# Patient Record
Sex: FEMALE | Race: BLACK OR AFRICAN AMERICAN | NOT HISPANIC OR LATINO | Employment: OTHER | URBAN - METROPOLITAN AREA
[De-identification: names, ages, dates, MRNs, and addresses within clinical notes are randomized per-mention and may not be internally consistent; named-entity substitution may affect disease eponyms.]

---

## 2018-11-28 ENCOUNTER — OFFICE VISIT (OUTPATIENT)
Dept: PULMONOLOGY | Facility: CLINIC | Age: 68
End: 2018-11-28
Payer: COMMERCIAL

## 2018-11-28 ENCOUNTER — LAB VISIT (OUTPATIENT)
Dept: LAB | Facility: HOSPITAL | Age: 68
End: 2018-11-28
Attending: INTERNAL MEDICINE
Payer: COMMERCIAL

## 2018-11-28 VITALS
OXYGEN SATURATION: 98 % | RESPIRATION RATE: 18 BRPM | HEIGHT: 71 IN | DIASTOLIC BLOOD PRESSURE: 62 MMHG | HEART RATE: 97 BPM | SYSTOLIC BLOOD PRESSURE: 130 MMHG | BODY MASS INDEX: 41.02 KG/M2 | WEIGHT: 293 LBS

## 2018-11-28 DIAGNOSIS — G47.33 OSA (OBSTRUCTIVE SLEEP APNEA): ICD-10-CM

## 2018-11-28 DIAGNOSIS — R06.89 DYSPNEA AND RESPIRATORY ABNORMALITIES: Primary | ICD-10-CM

## 2018-11-28 DIAGNOSIS — E66.01 CLASS 3 SEVERE OBESITY DUE TO EXCESS CALORIES WITH SERIOUS COMORBIDITY AND BODY MASS INDEX (BMI) OF 45.0 TO 49.9 IN ADULT: Chronic | ICD-10-CM

## 2018-11-28 DIAGNOSIS — R06.89 DYSPNEA AND RESPIRATORY ABNORMALITIES: ICD-10-CM

## 2018-11-28 DIAGNOSIS — R06.00 DYSPNEA AND RESPIRATORY ABNORMALITIES: Primary | ICD-10-CM

## 2018-11-28 DIAGNOSIS — R06.00 DYSPNEA AND RESPIRATORY ABNORMALITIES: ICD-10-CM

## 2018-11-28 PROBLEM — E66.813 CLASS 3 SEVERE OBESITY DUE TO EXCESS CALORIES WITH SERIOUS COMORBIDITY AND BODY MASS INDEX (BMI) OF 45.0 TO 49.9 IN ADULT: Status: ACTIVE | Noted: 2018-11-28

## 2018-11-28 LAB
ALBUMIN SERPL BCP-MCNC: 3.2 G/DL
ALP SERPL-CCNC: 91 U/L
ALT SERPL W/O P-5'-P-CCNC: 9 U/L
ANION GAP SERPL CALC-SCNC: 11 MMOL/L
AST SERPL-CCNC: 13 U/L
BASOPHILS # BLD AUTO: 0.08 K/UL
BASOPHILS NFR BLD: 1 %
BILIRUB SERPL-MCNC: 0.3 MG/DL
BNP SERPL-MCNC: 22 PG/ML
BUN SERPL-MCNC: 26 MG/DL
CALCIUM SERPL-MCNC: 9.8 MG/DL
CHLORIDE SERPL-SCNC: 110 MMOL/L
CO2 SERPL-SCNC: 21 MMOL/L
CREAT SERPL-MCNC: 1.4 MG/DL
CRP SERPL-MCNC: 47.3 MG/L
DIFFERENTIAL METHOD: ABNORMAL
EOSINOPHIL # BLD AUTO: 0.1 K/UL
EOSINOPHIL NFR BLD: 1.2 %
ERYTHROCYTE [DISTWIDTH] IN BLOOD BY AUTOMATED COUNT: 17.7 %
ERYTHROCYTE [SEDIMENTATION RATE] IN BLOOD BY WESTERGREN METHOD: 31 MM/HR
EST. GFR  (AFRICAN AMERICAN): 45.2 ML/MIN/1.73 M^2
EST. GFR  (NON AFRICAN AMERICAN): 39.2 ML/MIN/1.73 M^2
GLUCOSE SERPL-MCNC: 99 MG/DL
HCT VFR BLD AUTO: 37.6 %
HGB BLD-MCNC: 10.9 G/DL
IMM GRANULOCYTES # BLD AUTO: 0.02 K/UL
IMM GRANULOCYTES NFR BLD AUTO: 0.2 %
LYMPHOCYTES # BLD AUTO: 2.5 K/UL
LYMPHOCYTES NFR BLD: 30.6 %
MCH RBC QN AUTO: 22.1 PG
MCHC RBC AUTO-ENTMCNC: 29 G/DL
MCV RBC AUTO: 76 FL
MONOCYTES # BLD AUTO: 0.6 K/UL
MONOCYTES NFR BLD: 7.7 %
NEUTROPHILS # BLD AUTO: 4.9 K/UL
NEUTROPHILS NFR BLD: 59.3 %
NRBC BLD-RTO: 0 /100 WBC
PLATELET # BLD AUTO: 320 K/UL
PMV BLD AUTO: 9.9 FL
POTASSIUM SERPL-SCNC: 4 MMOL/L
PROT SERPL-MCNC: 7.4 G/DL
RBC # BLD AUTO: 4.93 M/UL
SODIUM SERPL-SCNC: 142 MMOL/L
TSH SERPL DL<=0.005 MIU/L-ACNC: 2.1 UIU/ML
WBC # BLD AUTO: 8.23 K/UL

## 2018-11-28 PROCEDURE — 86038 ANTINUCLEAR ANTIBODIES: CPT

## 2018-11-28 PROCEDURE — 36415 COLL VENOUS BLD VENIPUNCTURE: CPT

## 2018-11-28 PROCEDURE — 83880 ASSAY OF NATRIURETIC PEPTIDE: CPT

## 2018-11-28 PROCEDURE — 80053 COMPREHEN METABOLIC PANEL: CPT

## 2018-11-28 PROCEDURE — 84443 ASSAY THYROID STIM HORMONE: CPT

## 2018-11-28 PROCEDURE — 86140 C-REACTIVE PROTEIN: CPT

## 2018-11-28 PROCEDURE — 99999 PR PBB SHADOW E&M-EST. PATIENT-LVL III: CPT | Mod: PBBFAC,,, | Performed by: INTERNAL MEDICINE

## 2018-11-28 PROCEDURE — 99205 OFFICE O/P NEW HI 60 MIN: CPT | Mod: S$GLB,,, | Performed by: INTERNAL MEDICINE

## 2018-11-28 PROCEDURE — 85025 COMPLETE CBC W/AUTO DIFF WBC: CPT

## 2018-11-28 PROCEDURE — 1101F PT FALLS ASSESS-DOCD LE1/YR: CPT | Mod: CPTII,S$GLB,, | Performed by: INTERNAL MEDICINE

## 2018-11-28 PROCEDURE — 85651 RBC SED RATE NONAUTOMATED: CPT

## 2018-11-28 PROCEDURE — 86255 FLUORESCENT ANTIBODY SCREEN: CPT

## 2018-11-28 RX ORDER — QUETIAPINE FUMARATE 100 MG/1
TABLET, FILM COATED ORAL
COMMUNITY
Start: 2018-11-25 | End: 2019-01-22

## 2018-11-28 RX ORDER — DOXAZOSIN 4 MG/1
TABLET ORAL
Refills: 1 | COMMUNITY
Start: 2018-09-26 | End: 2018-11-28

## 2018-11-28 RX ORDER — MELOXICAM 15 MG/1
TABLET ORAL
Refills: 2 | COMMUNITY
Start: 2018-10-17 | End: 2018-11-28

## 2018-11-28 RX ORDER — HYDROCHLOROTHIAZIDE 12.5 MG/1
CAPSULE ORAL
Refills: 11 | COMMUNITY
Start: 2018-10-28 | End: 2018-11-28

## 2018-11-28 RX ORDER — AMLODIPINE BESYLATE 10 MG/1
TABLET ORAL
Refills: 1 | COMMUNITY
Start: 2018-09-26 | End: 2019-05-14

## 2018-11-28 NOTE — ASSESSMENT & PLAN NOTE
Etiology unclear.  Multifactorial etiology suspected.  Likely contributors to etiology (checked)    [x] Pulmonary airway disease    [x]  Pulmonary parenchymal disease  [] Pulmonary vascular disease   [] Pleural disease  [] Pulmonary vasculitis  [x] Hypoventilation ( chest wall deformity, neuromuscular disease, obesity etc)  [x] Anemia  [x] Thyroid disease.  [x] Cardiac illess  [x]         Sleep disorder    EVALUATION:  []        Complete PFT with bronchodilator.  []        Bronchial challenge with methacholine.   [x]        Stress test, pulmonary.  [x]        PULM - Arterial Blood Gases  [x]        Chest X Ray  []        CT scan of chest.   [x]        ISAC  [x]        Sedimentation rate  [x]        C-reactive protein  [x]        Anti-neutrophilic cytoplasmic antibody  [x]        2D ECHO with color flow doppler and bubble contrast.       PLAN:  Discussed diagnosis, its evaluation, treatment and usual course.  All questions answered.        Call if shortness of breath worsens, blood in sputum, change in character of cough, development of fever or chills, inability to maintain nutrition and hydration.     Re evaluate in four weeks with results.

## 2018-11-28 NOTE — PATIENT INSTRUCTIONS
Lung Anatomy  Your lungs take air in to give your body oxygen, which the body needs to work. Your lungs, like all the tissues in your body, are made up of billions of tiny specialized cells. Old lung cells die and are replaced by new, identical lung cells. This natural process helps ensure healthy lungs.    Date Last Reviewed: 11/1/2016  © 4148-6523 [employer]. [address] [place] [address], [place] [address]. All rights reserved. This information is not intended as a substitute for professional medical care. Always follow your healthcare professional's instructions.

## 2018-11-28 NOTE — ASSESSMENT & PLAN NOTE
My recommendation at this point would be to set up a home sleep [facility].  We have discussed weight loss and how this may improve her situation.  We have discussed behavioral modifications, as well.  After her study, she  could certainly try a CPAP.

## 2018-11-29 ENCOUNTER — TELEPHONE (OUTPATIENT)
Dept: PULMONOLOGY | Facility: CLINIC | Age: 68
End: 2018-11-29

## 2018-11-29 LAB — ANA SER QL IF: NORMAL

## 2018-11-30 LAB
ANCA AB TITR SER IF: NORMAL TITER
P-ANCA TITR SER IF: NORMAL TITER

## 2018-12-26 LAB — HCV AB SER-ACNC: NEGATIVE

## 2018-12-28 ENCOUNTER — HOSPITAL ENCOUNTER (OUTPATIENT)
Dept: RADIOLOGY | Facility: HOSPITAL | Age: 68
Discharge: HOME | End: 2018-12-28
Attending: INTERNAL MEDICINE
Payer: COMMERCIAL

## 2018-12-28 ENCOUNTER — CLINICAL SUPPORT (OUTPATIENT)
Dept: PULMONOLOGY | Facility: CLINIC | Age: 68
End: 2018-12-28
Payer: COMMERCIAL

## 2018-12-28 DIAGNOSIS — R06.00 DYSPNEA AND RESPIRATORY ABNORMALITIES: ICD-10-CM

## 2018-12-28 DIAGNOSIS — R06.89 DYSPNEA AND RESPIRATORY ABNORMALITIES: ICD-10-CM

## 2018-12-28 LAB
ALLENS TEST: ABNORMAL
DELSYS: ABNORMAL
HCO3 UR-SCNC: 20.7 MMOL/L (ref 24–28)
PCO2 BLDA: 38.6 MMHG (ref 35–45)
PH SMN: 7.34 [PH] (ref 7.35–7.45)
PO2 BLDA: 41 MMHG (ref 40–60)
POC BE: -5 MMOL/L
POC SATURATED O2: 73 % (ref 95–100)
SAMPLE: ABNORMAL
SITE: ABNORMAL

## 2018-12-28 PROCEDURE — 94729 DIFFUSING CAPACITY: CPT | Mod: S$GLB,,, | Performed by: INTERNAL MEDICINE

## 2018-12-28 PROCEDURE — 71046 X-RAY EXAM CHEST 2 VIEWS: CPT | Mod: TC

## 2018-12-28 PROCEDURE — 71046 X-RAY EXAM CHEST 2 VIEWS: CPT | Mod: 26,,, | Performed by: RADIOLOGY

## 2018-12-28 PROCEDURE — 94060 EVALUATION OF WHEEZING: CPT | Mod: S$GLB,,, | Performed by: INTERNAL MEDICINE

## 2018-12-28 PROCEDURE — 94726 PLETHYSMOGRAPHY LUNG VOLUMES: CPT | Mod: S$GLB,,, | Performed by: INTERNAL MEDICINE

## 2018-12-28 NOTE — PROGRESS NOTES
Patient unable to walk due to knee pain from a procedure done on both knees. Will reschedule at a later time.

## 2018-12-31 LAB
BRPFT: ABNORMAL
ERV LLN: 0.77
ERV PRE REF: 16.9 %
ERV REF: 0.77
ERVN2 LLN: 0.77
ERVN2 REF: 0.77
FEF 25 75 CHG: 13.4 %
FEF 25 75 LLN: 0.78
FEF 25 75 POST REF: 136.7 %
FEF 25 75 PRE REF: 120.6 %
FEF 25 75 REF: 1.98
FET100 CHG: 42.3 %
FEV1 CHG: 6.9 %
FEV1 FVC CHG: -3.7 %
FEV1 FVC LLN: 66
FEV1 FVC POST REF: 116.8 %
FEV1 FVC PRE REF: 121.3 %
FEV1 FVC REF: 78
FEV1 LLN: 1.68
FEV1 POST REF: 97.9 %
FEV1 PRE REF: 91.6 %
FEV1 REF: 2.37
FEV6 LLN: 2.23
FEV6 REF: 3.06
FRCN2 LLN: 2.21
FRCN2 REF: 3.03
FRCPLETH LLN: 2.21
FRCPLETH PREREF: 89.3 %
FRCPLETH REF: 3.03
FVC CHG: 11.1 %
FVC LLN: 2.19
FVC POST REF: 83.1 %
FVC PRE REF: 74.9 %
FVC REF: 3.06
MVV LLN: 93
MVV PRE REF: 42.9 %
MVV REF: 110
PEF CHG: -22.6 %
PEF LLN: 3.76
PEF POST REF: 66.5 %
PEF PRE REF: 85.9 %
PEF REF: 6.14
POST FEF 25 75: 2.7 L/S (ref 0.78–3.17)
POST FET 100: 1.78 SEC
POST FEV1 FVC: 91.35 % (ref 65.82–90.61)
POST FEV1: 2.32 L (ref 1.68–3.06)
POST FVC: 2.54 L (ref 2.19–3.92)
POST PEF: 4.08 L/S (ref 3.76–8.52)
PRE ERV: 0.13 L (ref 0.77–0.77)
PRE FEF 25 75: 2.38 L/S (ref 0.78–3.17)
PRE FET 100: 1.25 SEC
PRE FEV1 FVC: 94.89 % (ref 65.82–90.61)
PRE FEV1: 2.17 L (ref 1.68–3.06)
PRE FRC PL: 2.71 L
PRE FVC: 2.29 L (ref 2.19–3.92)
PRE MVV: 47 L/MIN (ref 93.21–126.11)
PRE PEF: 5.27 L/S (ref 3.76–8.52)
PRE RV: 2.59 L (ref 1.68–2.84)
PRE TLC: 4.88 L (ref 4.91–6.88)
RAW LLN: 3.06
RAW PRE REF: 113.1 %
RAW PRE: 3.46 CMH2O*S/L (ref 3.06–3.06)
RAW REF: 3.06
RV LLN: 1.68
RV PRE REF: 114.4 %
RV REF: 2.26
RVN2 LLN: 1.68
RVN2 REF: 2.26
RVN2TLCN2 LLN: 32
RVN2TLCN2 REF: 41
RVTLC LLN: 32
RVTLC PRE REF: 128.1 %
RVTLC PRE: 53.05 % (ref 31.81–50.99)
RVTLC REF: 41
TLC LLN: 4.91
TLC PRE REF: 82.7 %
TLC REF: 5.89
TLCN2 LLN: 4.91
TLCN2 REF: 5.89
VC LLN: 2.19
VC PRE REF: 74.9 %
VC PRE: 2.29 L (ref 2.19–3.92)
VC REF: 3.06
VCMAXN2 LLN: 2.19
VCMAXN2 REF: 3.06
VTGRAWPRE: 2.63 L

## 2019-01-22 ENCOUNTER — TELEPHONE (OUTPATIENT)
Dept: OBSTETRICS AND GYNECOLOGY | Facility: CLINIC | Age: 69
End: 2019-01-22

## 2019-01-22 ENCOUNTER — OFFICE VISIT (OUTPATIENT)
Dept: OBSTETRICS AND GYNECOLOGY | Facility: CLINIC | Age: 69
End: 2019-01-22
Payer: COMMERCIAL

## 2019-01-22 VITALS
HEIGHT: 71 IN | DIASTOLIC BLOOD PRESSURE: 76 MMHG | BODY MASS INDEX: 41.02 KG/M2 | WEIGHT: 293 LBS | SYSTOLIC BLOOD PRESSURE: 120 MMHG

## 2019-01-22 DIAGNOSIS — Z00.00 PREVENTATIVE HEALTH CARE: ICD-10-CM

## 2019-01-22 DIAGNOSIS — Z01.419 GYNECOLOGIC EXAM NORMAL: ICD-10-CM

## 2019-01-22 DIAGNOSIS — Z12.39 BREAST CANCER SCREENING: Primary | ICD-10-CM

## 2019-01-22 DIAGNOSIS — N89.8 VAGINAL CYST: ICD-10-CM

## 2019-01-22 PROCEDURE — 88304 TISSUE SPECIMEN TO PATHOLOGY, OBSTETRICS/GYNECOLOGY: ICD-10-PCS | Mod: 26,,, | Performed by: PATHOLOGY

## 2019-01-22 PROCEDURE — 99999 PR PBB SHADOW E&M-EST. PATIENT-LVL II: CPT | Mod: PBBFAC,,, | Performed by: NURSE PRACTITIONER

## 2019-01-22 PROCEDURE — 99999 PR PBB SHADOW E&M-EST. PATIENT-LVL II: ICD-10-PCS | Mod: PBBFAC,,, | Performed by: NURSE PRACTITIONER

## 2019-01-22 PROCEDURE — 99387 PR PREVENTIVE VISIT,NEW,65 & OVER: ICD-10-PCS | Mod: S$GLB,,, | Performed by: NURSE PRACTITIONER

## 2019-01-22 PROCEDURE — 88304 TISSUE EXAM BY PATHOLOGIST: CPT | Mod: 26,,, | Performed by: PATHOLOGY

## 2019-01-22 PROCEDURE — 88304 TISSUE EXAM BY PATHOLOGIST: CPT | Performed by: PATHOLOGY

## 2019-01-22 PROCEDURE — 99387 INIT PM E/M NEW PAT 65+ YRS: CPT | Mod: S$GLB,,, | Performed by: NURSE PRACTITIONER

## 2019-01-22 RX ORDER — ERGOCALCIFEROL 1.25 MG/1
CAPSULE ORAL
COMMUNITY
Start: 2018-12-27 | End: 2019-01-22

## 2019-01-22 RX ORDER — DOXAZOSIN 4 MG/1
TABLET ORAL
COMMUNITY
Start: 2018-12-24 | End: 2019-04-30

## 2019-01-22 RX ORDER — BUTALBITAL, ACETAMINOPHEN AND CAFFEINE 50; 325; 40 MG/1; MG/1; MG/1
CAPSULE ORAL
COMMUNITY
Start: 2019-01-02 | End: 2019-04-30 | Stop reason: SDUPTHER

## 2019-01-22 NOTE — TELEPHONE ENCOUNTER
----- Message from Felicita Harrison sent at 1/22/2019 11:42 AM CST -----  Contact: pt  Pt is on her way.  Stuck on the exit of the inter s[place] in traffic. Should be another 5-10 minutes

## 2019-01-22 NOTE — PROGRESS NOTES
"CC: Well woman exam    Susu Luther is a 69 y.o. female  presents for well woman exam.  LMP: , No AUB. Last mammogram,2016, normal. Normal dexa scan, 2018. Is sexually active, no issues.   No issues, problems, or complaints.      Past Medical History:   Diagnosis Date    Hypertension      Past Surgical History:   Procedure Laterality Date     SECTION      OOPHORECTOMY      right      Social History     Socioeconomic History    Marital status:      Spouse name: Not on file    Number of children: Not on file    Years of education: Not on file    Highest education level: Not on file   Social Needs    Financial resource strain: Not on file    Food insecurity - worry: Not on file    Food insecurity - inability: Not on file    Transportation needs - medical: Not on file    Transportation needs - non-medical: Not on file   Occupational History    Not on file   Tobacco Use    Smoking status: Never Smoker    Smokeless tobacco: Never Used   Substance and Sexual Activity    Alcohol use: No    Drug use: No    Sexual activity: Yes     Partners: Male     Birth control/protection: Post-menopausal   Other Topics Concern    Not on file   Social History Narrative    Not on file     Family History   Problem Relation Age of Onset    Diabetes Maternal Grandmother     Diabetes Maternal Grandfather     Diabetes Mother     Breast cancer Neg Hx     Colon cancer Neg Hx     Ovarian cancer Neg Hx      OB History      Para Term  AB Living    2 2 2     2    SAB TAB Ectopic Multiple Live Births            2          /76 (BP Location: Left arm, Patient Position: Sitting, BP Method: Large (Manual))   Ht 5' 11" (1.803 m)   Wt (!) 146.7 kg (323 lb 6.6 oz)   BMI 45.11 kg/m²       ROS:  GENERAL: Denies weight gain or weight loss. Feeling well overall.   SKIN: Denies rash or lesions.   HEAD: Denies head injury or headache.   NODES: Denies enlarged lymph nodes.   CHEST: " Denies chest pain or shortness of breath.   CARDIOVASCULAR: Denies palpitations or left sided chest pain.   ABDOMEN: No abdominal pain, constipation, diarrhea, nausea, vomiting or rectal bleeding.   URINARY: No frequency, dysuria, hematuria, or burning on urination.  REPRODUCTIVE: See HPI.   BREASTS: The patient performs breast self-examination and denies pain, lumps, or nipple discharge.   HEMATOLOGIC: No easy bruisability or excessive bleeding.   MUSCULOSKELETAL: Denies joint pain or swelling.   NEUROLOGIC: Denies syncope or weakness.   PSYCHIATRIC: Denies depression, anxiety or mood swings.    PHYSICAL EXAM:  APPEARANCE: Obese AA female, in no acute distress.  AFFECT: WNL, alert and oriented x 3  SKIN: No acne or hirsutism  NECK: Neck symmetric without masses or thyromegaly  NODES: No inguinal, cervical, axillary, or femoral lymph node enlargement  CHEST: Good respiratory effect  ABDOMEN: Soft.  No tenderness or masses.  No hepatosplenomegaly.  No hernias.  BREASTS: Symmetrical, no skin changes or visible lesions.  No palpable masses, nipple discharge bilaterally.  PELVIC: External genitalia, small cyst mons pubis.  Normal hair distribution.  Adequate perineal body, normal urethral meatus.  Vagina atrophy without lesions or discharge.  Cervix pink, without lesions, discharge or tenderness.  No significant cystocele or rectocele.  Bimanual exam shows uterus to be normal size, regular, mobile and nontender.  Adnexa without masses or tenderness.    EXTREMITIES: No edema.    1. Breast cancer screening  Mammo Digital Screening Bilat   2. Preventative health care  Mammo Digital Screening Bilat   3. Gynecologic exam normal  Mammo Digital Screening Bilat     PLAN:  Mammogram  Cyst removed:  4 cc lidocaine injected into cyst  Using small scissors and pick-up, cyst removed  Silver nitrate to area, no bleeding  Cyst sent for pathology      Patient was counseled today on A.C.S. Pap guidelines and recommendations for yearly  pelvic exams, mammograms and monthly self breast exams; to see her PCP for other health maintenance.

## 2019-02-26 ENCOUNTER — HOSPITAL ENCOUNTER (OUTPATIENT)
Dept: RADIOLOGY | Facility: HOSPITAL | Age: 69
Discharge: HOME | End: 2019-02-26
Attending: NURSE PRACTITIONER
Payer: COMMERCIAL

## 2019-02-26 DIAGNOSIS — Z00.00 PREVENTATIVE HEALTH CARE: ICD-10-CM

## 2019-02-26 DIAGNOSIS — Z12.39 BREAST CANCER SCREENING: ICD-10-CM

## 2019-02-26 DIAGNOSIS — Z01.419 GYNECOLOGIC EXAM NORMAL: ICD-10-CM

## 2019-02-26 PROCEDURE — 77067 MAMMO DIGITAL SCREENING BILAT WITH CAD: ICD-10-PCS | Mod: 26,,, | Performed by: RADIOLOGY

## 2019-02-26 PROCEDURE — 77067 SCR MAMMO BI INCL CAD: CPT | Mod: TC

## 2019-02-26 PROCEDURE — 77067 SCR MAMMO BI INCL CAD: CPT | Mod: 26,,, | Performed by: RADIOLOGY

## 2019-02-27 ENCOUNTER — TELEPHONE (OUTPATIENT)
Dept: OBSTETRICS AND GYNECOLOGY | Facility: CLINIC | Age: 69
End: 2019-02-27

## 2019-02-27 DIAGNOSIS — R92.8 ABNORMAL MAMMOGRAM OF RIGHT BREAST: Primary | ICD-10-CM

## 2019-02-27 NOTE — PROGRESS NOTES
Please call patient and inform her that mammogram indicated the need to look more carefully at right breast. Needs right dx mammogram and ultrasound. I attempted to reach her twice.

## 2019-02-27 NOTE — TELEPHONE ENCOUNTER
----- Message from Janet Jain NP sent at 2/27/2019  9:24 AM CST -----  Please call patient and inform her that mammogram indicated the need to look more carefully at right breast. Needs right dx mammogram and ultrasound. I attempted to reach her twice.

## 2019-02-27 NOTE — TELEPHONE ENCOUNTER
Spoke with patient, pt is aware of diagnostic mammo and breast ultrasound scheduled for 2/28/19 1:30/2:00 PM at [facility].

## 2019-02-28 ENCOUNTER — HOSPITAL ENCOUNTER (OUTPATIENT)
Dept: RADIOLOGY | Facility: HOSPITAL | Age: 69
Discharge: HOME | End: 2019-02-28
Attending: NURSE PRACTITIONER
Payer: COMMERCIAL

## 2019-02-28 ENCOUNTER — OFFICE VISIT (OUTPATIENT)
Dept: SURGERY | Facility: CLINIC | Age: 69
End: 2019-02-28
Payer: COMMERCIAL

## 2019-02-28 VITALS — TEMPERATURE: 98 F | WEIGHT: 293 LBS | BODY MASS INDEX: 44.18 KG/M2

## 2019-02-28 DIAGNOSIS — R92.8 ABNORMAL MAMMOGRAM: ICD-10-CM

## 2019-02-28 DIAGNOSIS — R92.8 ABNORMAL MAMMOGRAM OF RIGHT BREAST: Primary | ICD-10-CM

## 2019-02-28 DIAGNOSIS — I10 ESSENTIAL HYPERTENSION: ICD-10-CM

## 2019-02-28 PROCEDURE — 99244 OFF/OP CNSLTJ NEW/EST MOD 40: CPT | Mod: S$GLB,,, | Performed by: SURGERY

## 2019-02-28 PROCEDURE — 76642 US BREAST RIGHT LIMITED: ICD-10-PCS | Mod: 26,RT,, | Performed by: RADIOLOGY

## 2019-02-28 PROCEDURE — 76642 ULTRASOUND BREAST LIMITED: CPT | Mod: 26,RT,, | Performed by: RADIOLOGY

## 2019-02-28 PROCEDURE — 77061 BREAST TOMOSYNTHESIS UNI: CPT | Mod: 26,,, | Performed by: RADIOLOGY

## 2019-02-28 PROCEDURE — 99999 PR PBB SHADOW E&M-EST. PATIENT-LVL III: CPT | Mod: PBBFAC,,, | Performed by: SURGERY

## 2019-02-28 PROCEDURE — 99244 PR OFFICE CONSULTATION,LEVEL IV: ICD-10-PCS | Mod: S$GLB,,, | Performed by: SURGERY

## 2019-02-28 PROCEDURE — 77065 DX MAMMO INCL CAD UNI: CPT | Mod: TC

## 2019-02-28 PROCEDURE — 99999 PR PBB SHADOW E&M-EST. PATIENT-LVL III: ICD-10-PCS | Mod: PBBFAC,,, | Performed by: SURGERY

## 2019-02-28 PROCEDURE — 77065 DX MAMMO INCL CAD UNI: CPT | Mod: 26,,, | Performed by: RADIOLOGY

## 2019-02-28 PROCEDURE — 77061 MAMMO DIGITAL DIAGNOSTIC RIGHT WITH TOMOSYNTHESIS_CAD: ICD-10-PCS | Mod: 26,,, | Performed by: RADIOLOGY

## 2019-02-28 PROCEDURE — 77061 BREAST TOMOSYNTHESIS UNI: CPT | Mod: TC

## 2019-02-28 PROCEDURE — 77065 MAMMO DIGITAL DIAGNOSTIC RIGHT WITH TOMOSYNTHESIS_CAD: ICD-10-PCS | Mod: 26,,, | Performed by: RADIOLOGY

## 2019-02-28 PROCEDURE — 76642 ULTRASOUND BREAST LIMITED: CPT | Mod: TC,RT

## 2019-02-28 NOTE — PATIENT INSTRUCTIONS
We need to have ultrasound guided biopsies done of both the breast mass in the enlarged lymph nodes.    Once the biopsies have been done it takes anywhere from 3-5 days rest to get the pathology results.  Once I have the pathology results I will call you.    We will also plan to see you back a week after the biopsy to go over the results and discuss any additional testing.    If this biopsy result does come back as a breast cancer I will ask that you meet with 1 of her medical oncologist.    If you have any questions or concerns feel free to give us a call and I will get back to as soon as possible

## 2019-02-28 NOTE — LETTER
February 28, 2019      Janet Jain NP  [address] [place] [address]           [facility]  [address]  [place] [address]  Phone: [phone]  Fax: [phone]          Patient: Susu Luther   MR Number: [id number]   YOB: 1950   Date of Visit: 2/28/2019       Dear Janet Jain:    Thank you for referring Susu Luther to me for evaluation. Attached you will find relevant portions of my assessment and plan of care.    If you have questions, please do not hesitate to call me. I look forward to following Susu Luther along with you.    Sincerely,    Artemio Starks MD    Enclosure  CC:  No Recipients    If you would like to receive this communication electronically, please contact [email or web address] or [phone] to request more information on [employer] Link access.    For providers and/or their staff who would like to refer a patient to [facility], please contact us through our one-stop-shop provider referral line, [facility], at [phone].    If you feel you have received this communication in error or would no longer like to receive these types of communications, please e-mail [email or web address]

## 2019-02-28 NOTE — PROGRESS NOTES
Patient ID: Susu Luther is a 69 y.o. female.    Category 5 mammogram    Chief Complaint: Consult and Breast Problem      HPI:  Patient was television.  Some information came on about breast cancer screening.  She did a self breast examination and noticed a mass in her right breast.  She subsequently underwent mammograms and ultrasounds.  The imaging studies were very concerned for a breast cancer in the right breast and enlarged lymph nodes in the right axilla.  She presents now to discuss biopsy in briefly discussed surgical intervention.    She does not report any skin changes or nipple discharges.    The patient does has dyspnea on exertion and gets short of breath going from her living room to her kitchen.        Review of Systems   Constitutional: Negative for appetite change, chills, fatigue, fever and unexpected weight change.   HENT: Negative for hearing loss and rhinorrhea.    Eyes: Negative for visual disturbance.   Respiratory: Positive for shortness of breath. Negative for apnea, cough and wheezing.    Cardiovascular: Negative for chest pain and palpitations.   Gastrointestinal: Negative for abdominal distention, abdominal pain, blood in stool, constipation, diarrhea, nausea and vomiting.   Genitourinary: Negative for dysuria, frequency and urgency.   Musculoskeletal: Negative for arthralgias and neck pain.   Skin: Negative for rash.        Mass in the right breast   Neurological: Negative for seizures, weakness, numbness and headaches.   Hematological: Negative for adenopathy. Does not bruise/bleed easily.   Psychiatric/Behavioral: Negative for hallucinations. The patient is not nervous/anxious.        Current Outpatient Medications   Medication Sig Dispense Refill    amLODIPine (NORVASC) 10 MG tablet TK 1 T PO QD  1    butalbital-acetaminophen-caff -40 mg -40 mg Cap       cholecalciferol, vitamin D3, 50,000 unit capsule   0    cyanocobalamin 1,000 mcg/mL injection   0     doxazosin (CARDURA) 4 MG tablet       lorazepam (ATIVAN) 1 MG tablet Take 1 mg by mouth 3 (three) times daily.  0     No current facility-administered medications for this visit.        Review of patient's allergies indicates:   Allergen Reactions    Pcn [penicillins] Rash       Past Medical History:   Diagnosis Date    Hypertension        Past Surgical History:   Procedure Laterality Date     SECTION      OOPHORECTOMY      right        Family History   Problem Relation Age of Onset    Diabetes Maternal Grandmother     Diabetes Maternal Grandfather     Diabetes Mother     Breast cancer Neg Hx     Colon cancer Neg Hx     Ovarian cancer Neg Hx      neice with breast cancer age 26  Menses  Unsure  First pregnancy 28    Breast feeding none  Menopause early 50s  HRT none    Social History     Socioeconomic History    Marital status:      Spouse name: Not on file    Number of children: Not on file    Years of education: Not on file    Highest education level: Not on file   Social Needs    Financial resource strain: Not on file    Food insecurity - worry: Not on file    Food insecurity - inability: Not on file    Transportation needs - medical: Not on file    Transportation needs - non-medical: Not on file   Occupational History    Not on file   Tobacco Use    Smoking status: Never Smoker    Smokeless tobacco: Never Used   Substance and Sexual Activity    Alcohol use: No    Drug use: No    Sexual activity: Yes     Partners: Male     Birth control/protection: Post-menopausal   Other Topics Concern    Not on file   Social History Narrative    Not on file       Vitals:    19 1512   Temp: 98.4 °F (36.9 °C)       Physical Exam   Constitutional: She appears well-developed. No distress.   Obese   Eyes: Pupils are equal, round, and reactive to light.   Neck: Normal range of motion. Neck supple. No JVD present. No tracheal deviation present. No thyromegaly present.    Cardiovascular: Normal rate, regular rhythm and intact distal pulses.   Pulmonary/Chest: Effort normal and breath sounds normal.   Abdominal: Soft. Bowel sounds are normal.   Musculoskeletal: She exhibits no edema.   Neurological: She is alert.   Skin: Skin is warm and dry. Capillary refill takes less than 2 seconds.   Bilateral breast exam was performed. On the left side there are no skin changes, masses, nipple discharge nor axillary adenopathy    On the right side there is a easily palpable 4 cm mass at the 3:00 position.  There are no skin changes or nipple discharges.  There is no palpable axillary adenopathy   Psychiatric: She has a normal mood and affect. Her behavior is normal. Judgment and thought content normal.   Vitals reviewed.    Mammograms, compression views and ultrasound of the breast and axilla were reviewed      Assessment & Plan:    category 5 mammogram of the right breast with enlarged axillary lymph nodes.  These are very suspicious for breast cancer    Ultrasound-guided biopsy of the right breast and the right axillary lymph nodes.    Patient will be notified by phone of the results, we will also see her back in the office a week after the biopsy to discuss the results.    If the axillary lymph nodes are positive I would recommend oncology consultation to discuss neoadjuvant chemotherapy and determine if a workup for metastatic disease is warranted.    With regards to surgical intervention if the lymph nodes are positive she would need a MediPort placed for neoadjuvant chemotherapy.  If the lymph nodes were negative she would be a candidate for either mastectomy or breast conservation therapy with a sentinel node biopsy and axillary dissection if indicated.    I briefly discussed sentinel node biopsy, breast conservation therapy, mastectomy, and mastectomy with reconstruction as treatment for breast cancer.      Before any surgical procedure that required general endotracheal anesthesia I  would obtain a pulmonary risk assessment    Questions were answered

## 2019-03-04 ENCOUNTER — HOSPITAL ENCOUNTER (OUTPATIENT)
Dept: RADIOLOGY | Facility: HOSPITAL | Age: 69
Discharge: HOME | End: 2019-03-04
Attending: SURGERY
Payer: COMMERCIAL

## 2019-03-04 DIAGNOSIS — R92.8 ABNORMAL MAMMOGRAM: Primary | ICD-10-CM

## 2019-03-04 DIAGNOSIS — R92.8 ABNORMAL MAMMOGRAM: ICD-10-CM

## 2019-03-04 DIAGNOSIS — R92.8 ABNORMAL MAMMOGRAM OF RIGHT BREAST: ICD-10-CM

## 2019-03-04 PROCEDURE — 38505 NEEDLE BIOPSY LYMPH NODES: CPT | Mod: RT,,, | Performed by: RADIOLOGY

## 2019-03-04 PROCEDURE — 38505 US BIOPSY LYMPH NODE AXILLA: ICD-10-PCS | Mod: RT,,, | Performed by: RADIOLOGY

## 2019-03-04 PROCEDURE — 19083 US BREAST BIOPSY WITH IMAGING 1ST SITE RIGHT: ICD-10-PCS | Mod: RT,,, | Performed by: RADIOLOGY

## 2019-03-04 PROCEDURE — 88341 TISSUE SPECIMEN TO PATHOLOGY, RADIOLOGY: ICD-10-PCS | Mod: 26,,, | Performed by: PATHOLOGY

## 2019-03-04 PROCEDURE — 88341 IMHCHEM/IMCYTCHM EA ADD ANTB: CPT | Performed by: PATHOLOGY

## 2019-03-04 PROCEDURE — 88341 IMHCHEM/IMCYTCHM EA ADD ANTB: CPT | Mod: 26,,, | Performed by: PATHOLOGY

## 2019-03-04 PROCEDURE — 19083 BX BREAST 1ST LESION US IMAG: CPT | Mod: RT,,, | Performed by: RADIOLOGY

## 2019-03-04 PROCEDURE — 88342 TISSUE SPECIMEN TO PATHOLOGY, RADIOLOGY: ICD-10-PCS | Mod: 26,,, | Performed by: PATHOLOGY

## 2019-03-04 PROCEDURE — A4648 IMPLANTABLE TISSUE MARKER: HCPCS

## 2019-03-04 PROCEDURE — 88305 TISSUE SPECIMEN TO PATHOLOGY, RADIOLOGY: ICD-10-PCS | Mod: 26,,, | Performed by: PATHOLOGY

## 2019-03-04 PROCEDURE — 19083 BX BREAST 1ST LESION US IMAG: CPT | Mod: TC,RT

## 2019-03-04 PROCEDURE — 88305 TISSUE EXAM BY PATHOLOGIST: CPT | Performed by: PATHOLOGY

## 2019-03-04 PROCEDURE — 88342 IMHCHEM/IMCYTCHM 1ST ANTB: CPT | Mod: 26,,, | Performed by: PATHOLOGY

## 2019-03-04 PROCEDURE — 88305 TISSUE EXAM BY PATHOLOGIST: CPT | Mod: 26,,, | Performed by: PATHOLOGY

## 2019-03-04 NOTE — NURSING
Pressure held on right breast biopsy and right axilla site for 10 mins each,  hemostasis was achieved, steri strips were applied, and wound was covered with 4x4 gauze and a tegaderm. Dressing clean, dry and intact with no drainage noted.  Discharge instructions given verbally and in writing, patient voiced understandings.  Patient discharged and accompanied by family member.

## 2019-03-08 ENCOUNTER — TELEPHONE (OUTPATIENT)
Dept: OBSTETRICS AND GYNECOLOGY | Facility: CLINIC | Age: 69
End: 2019-03-08

## 2019-03-08 NOTE — TELEPHONE ENCOUNTER
Spoke with pt. Pt states she keeps receiving letters regarding her mammograms and biopsies. Notified pt that the radiology department is sending them. Advised to notify Dr. Starks at her appointment on 3/14/19. Pt verbalized understanding.

## 2019-03-08 NOTE — TELEPHONE ENCOUNTER
----- Message from Fadi Burnett sent at 3/8/2019  2:32 PM CST -----  Contact: Pt  Type:  Needs Medical Advice    Who Called: Pt  Symptoms (please be specific): n/a  How long has patient had these symptoms:  n/a  Pharmacy name and phone #:  n/a  Would the patient rather a call back or a response via [facility]? Call back  Best Call Back Number: [phone] or [phone] (home)   Additional Information: The pt is calling with questions and concerns about her recent results form her tests, please advise.

## 2019-03-12 ENCOUNTER — TELEPHONE (OUTPATIENT)
Dept: SURGERY | Facility: HOSPITAL | Age: 69
End: 2019-03-12

## 2019-03-12 NOTE — TELEPHONE ENCOUNTER
Patient was informed of her diagnosis of lobular breast cancer.  The lymph node biopsy was negative.  S drawn receptors were positive progesterone receptors were negative.  HER2 was pending.    She will follow up with us on Thursday.  I feel she is a good candidate for breast conservation therapy sentinel node biopsy.  If her 2 was positive an Oncology evaluation would be warranted preoperative

## 2019-03-14 ENCOUNTER — LAB VISIT (OUTPATIENT)
Dept: LAB | Facility: HOSPITAL | Age: 69
End: 2019-03-14
Attending: SURGERY
Payer: COMMERCIAL

## 2019-03-14 ENCOUNTER — OFFICE VISIT (OUTPATIENT)
Dept: SURGERY | Facility: CLINIC | Age: 69
End: 2019-03-14
Payer: COMMERCIAL

## 2019-03-14 ENCOUNTER — CLINICAL SUPPORT (OUTPATIENT)
Dept: CARDIOLOGY | Facility: CLINIC | Age: 69
End: 2019-03-14
Payer: COMMERCIAL

## 2019-03-14 VITALS
HEART RATE: 95 BPM | HEIGHT: 71 IN | BODY MASS INDEX: 41.02 KG/M2 | SYSTOLIC BLOOD PRESSURE: 137 MMHG | TEMPERATURE: 98 F | DIASTOLIC BLOOD PRESSURE: 93 MMHG | WEIGHT: 293 LBS

## 2019-03-14 DIAGNOSIS — Z17.0 MALIGNANT NEOPLASM OF UPPER-OUTER QUADRANT OF RIGHT BREAST IN FEMALE, ESTROGEN RECEPTOR POSITIVE: Primary | ICD-10-CM

## 2019-03-14 DIAGNOSIS — C50.411 MALIGNANT NEOPLASM OF UPPER-OUTER QUADRANT OF RIGHT BREAST IN FEMALE, ESTROGEN RECEPTOR POSITIVE: ICD-10-CM

## 2019-03-14 DIAGNOSIS — Z17.0 MALIGNANT NEOPLASM OF UPPER-OUTER QUADRANT OF RIGHT BREAST IN FEMALE, ESTROGEN RECEPTOR POSITIVE: ICD-10-CM

## 2019-03-14 DIAGNOSIS — C50.411 MALIGNANT NEOPLASM OF UPPER-OUTER QUADRANT OF RIGHT BREAST IN FEMALE, ESTROGEN RECEPTOR POSITIVE: Primary | ICD-10-CM

## 2019-03-14 LAB
ALBUMIN SERPL BCP-MCNC: 3.3 G/DL
ALP SERPL-CCNC: 108 U/L
ALT SERPL W/O P-5'-P-CCNC: 13 U/L
ANION GAP SERPL CALC-SCNC: 9 MMOL/L
AST SERPL-CCNC: 16 U/L
BASOPHILS # BLD AUTO: 0.08 K/UL
BASOPHILS NFR BLD: 0.8 %
BILIRUB SERPL-MCNC: 0.3 MG/DL
BUN SERPL-MCNC: 20 MG/DL
CALCIUM SERPL-MCNC: 9.8 MG/DL
CHLORIDE SERPL-SCNC: 105 MMOL/L
CO2 SERPL-SCNC: 26 MMOL/L
CREAT SERPL-MCNC: 1.3 MG/DL
DIFFERENTIAL METHOD: ABNORMAL
EOSINOPHIL # BLD AUTO: 0.2 K/UL
EOSINOPHIL NFR BLD: 1.7 %
ERYTHROCYTE [DISTWIDTH] IN BLOOD BY AUTOMATED COUNT: 19.4 %
EST. GFR  (AFRICAN AMERICAN): 49.1 ML/MIN/1.73 M^2
EST. GFR  (NON AFRICAN AMERICAN): 42.6 ML/MIN/1.73 M^2
GLUCOSE SERPL-MCNC: 103 MG/DL
HCT VFR BLD AUTO: 39.5 %
HGB BLD-MCNC: 12 G/DL
IMM GRANULOCYTES # BLD AUTO: 0.04 K/UL
IMM GRANULOCYTES NFR BLD AUTO: 0.4 %
LYMPHOCYTES # BLD AUTO: 3.3 K/UL
LYMPHOCYTES NFR BLD: 33.1 %
MCH RBC QN AUTO: 23.1 PG
MCHC RBC AUTO-ENTMCNC: 30.4 G/DL
MCV RBC AUTO: 76 FL
MONOCYTES # BLD AUTO: 0.9 K/UL
MONOCYTES NFR BLD: 9.4 %
NEUTROPHILS # BLD AUTO: 5.4 K/UL
NEUTROPHILS NFR BLD: 54.6 %
NRBC BLD-RTO: 0 /100 WBC
PLATELET # BLD AUTO: 334 K/UL
PMV BLD AUTO: 9.9 FL
POTASSIUM SERPL-SCNC: 4 MMOL/L
PROT SERPL-MCNC: 7.6 G/DL
RBC # BLD AUTO: 5.19 M/UL
SODIUM SERPL-SCNC: 140 MMOL/L
WBC # BLD AUTO: 9.93 K/UL

## 2019-03-14 PROCEDURE — 3075F PR MOST RECENT SYSTOLIC BLOOD PRESS GE 130-139MM HG: ICD-10-PCS | Mod: CPTII,S$GLB,, | Performed by: SURGERY

## 2019-03-14 PROCEDURE — 85025 COMPLETE CBC W/AUTO DIFF WBC: CPT

## 2019-03-14 PROCEDURE — 1101F PR PT FALLS ASSESS DOC 0-1 FALLS W/OUT INJ PAST YR: ICD-10-PCS | Mod: CPTII,S$GLB,, | Performed by: SURGERY

## 2019-03-14 PROCEDURE — 3080F PR MOST RECENT DIASTOLIC BLOOD PRESSURE >= 90 MM HG: ICD-10-PCS | Mod: CPTII,S$GLB,, | Performed by: SURGERY

## 2019-03-14 PROCEDURE — 99214 PR OFFICE/OUTPT VISIT, EST, LEVL IV, 30-39 MIN: ICD-10-PCS | Mod: S$GLB,,, | Performed by: SURGERY

## 2019-03-14 PROCEDURE — 99999 PR PBB SHADOW E&M-EST. PATIENT-LVL V: CPT | Mod: PBBFAC,,, | Performed by: SURGERY

## 2019-03-14 PROCEDURE — 3080F DIAST BP >= 90 MM HG: CPT | Mod: CPTII,S$GLB,, | Performed by: SURGERY

## 2019-03-14 PROCEDURE — 3075F SYST BP GE 130 - 139MM HG: CPT | Mod: CPTII,S$GLB,, | Performed by: SURGERY

## 2019-03-14 PROCEDURE — 99999 PR PBB SHADOW E&M-EST. PATIENT-LVL V: ICD-10-PCS | Mod: PBBFAC,,, | Performed by: SURGERY

## 2019-03-14 PROCEDURE — 93000 EKG 12-LEAD: ICD-10-PCS | Mod: S$GLB,,, | Performed by: INTERNAL MEDICINE

## 2019-03-14 PROCEDURE — 93000 ELECTROCARDIOGRAM COMPLETE: CPT | Mod: S$GLB,,, | Performed by: INTERNAL MEDICINE

## 2019-03-14 PROCEDURE — 1101F PT FALLS ASSESS-DOCD LE1/YR: CPT | Mod: CPTII,S$GLB,, | Performed by: SURGERY

## 2019-03-14 PROCEDURE — 80053 COMPREHEN METABOLIC PANEL: CPT

## 2019-03-14 PROCEDURE — 99214 OFFICE O/P EST MOD 30 MIN: CPT | Mod: S$GLB,,, | Performed by: SURGERY

## 2019-03-14 PROCEDURE — 36415 COLL VENOUS BLD VENIPUNCTURE: CPT

## 2019-03-14 RX ORDER — LIDOCAINE HYDROCHLORIDE 10 MG/ML
1 INJECTION, SOLUTION EPIDURAL; INFILTRATION; INTRACAUDAL; PERINEURAL ONCE
Status: DISCONTINUED | OUTPATIENT
Start: 2019-03-14 | End: 2019-03-27 | Stop reason: HOSPADM

## 2019-03-14 RX ORDER — ONDANSETRON 4 MG/1
8 TABLET, ORALLY DISINTEGRATING ORAL EVERY 8 HOURS PRN
Status: CANCELLED | OUTPATIENT
Start: 2019-03-14

## 2019-03-14 NOTE — H&P (VIEW-ONLY)
Patient ID: Susu Luther is a 69 y.o. female.    Right breast cancer    Chief Complaint: Results (breast bx)      HPI:  Patient discovered a lump in her right breast.  This led to evaluation by mammogram and ultrasound.  She was found to have a category 5 mammogram on breast imaging studies.  A core biopsy was done that showed a ER positive, progesterone receptor negative lobular breast cancer.  Evaluation of the enlarged lymph nodes by biopsy was negative.    Patient presents now to discuss surgical options.  She is interested in breast conservation therapy.            Review of Systems   Constitutional: Negative for appetite change, chills, fatigue, fever and unexpected weight change.   HENT: Negative for hearing loss and rhinorrhea.    Eyes: Negative for visual disturbance.   Respiratory: Positive for shortness of breath. Negative for apnea, cough and wheezing.    Cardiovascular: Negative for chest pain and palpitations.   Gastrointestinal: Negative for abdominal distention, abdominal pain, blood in stool, constipation, diarrhea, nausea and vomiting.   Genitourinary: Negative for dysuria, frequency and urgency.   Musculoskeletal: Positive for neck pain. Negative for arthralgias.   Skin: Negative for rash.   Neurological: Negative for seizures, weakness, numbness and headaches.   Hematological: Negative for adenopathy. Does not bruise/bleed easily.   Psychiatric/Behavioral: Negative for hallucinations. The patient is not nervous/anxious.        Current Outpatient Medications   Medication Sig Dispense Refill    amLODIPine (NORVASC) 10 MG tablet TK 1 T PO QD  1    butalbital-acetaminophen-caff -40 mg -40 mg Cap       cholecalciferol, vitamin D3, 50,000 unit capsule   0    cyanocobalamin 1,000 mcg/mL injection   0    doxazosin (CARDURA) 4 MG tablet       lorazepam (ATIVAN) 1 MG tablet Take 1 mg by mouth 3 (three) times daily.  0     Current Facility-Administered Medications   Medication Dose  Route Frequency Provider Last Rate Last Dose    lidocaine (PF) 10 mg/ml (1%) injection 10 mg  1 mL Intradermal Once Artemio Starks MD           Review of patient's allergies indicates:   Allergen Reactions    Pcn [penicillins] Rash       Past Medical History:   Diagnosis Date    Hypertension        Past Surgical History:   Procedure Laterality Date     SECTION      OOPHORECTOMY      right        Family History   Problem Relation Age of Onset    Diabetes Maternal Grandmother     Diabetes Maternal Grandfather     Diabetes Mother     Breast cancer Neg Hx     Colon cancer Neg Hx     Ovarian cancer Neg Hx        Social History     Socioeconomic History    Marital status:      Spouse name: Not on file    Number of children: Not on file    Years of education: Not on file    Highest education level: Not on file   Social Needs    Financial resource strain: Not on file    Food insecurity - worry: Not on file    Food insecurity - inability: Not on file    Transportation needs - medical: Not on file    Transportation needs - non-medical: Not on file   Occupational History    Not on file   Tobacco Use    Smoking status: Never Smoker    Smokeless tobacco: Never Used   Substance and Sexual Activity    Alcohol use: No    Drug use: No    Sexual activity: Yes     Partners: Male     Birth control/protection: Post-menopausal   Other Topics Concern    Not on file   Social History Narrative    Not on file       Vitals:    19 0954   BP: (!) 137/93   Pulse: 95   Temp: 97.9 °F (36.6 °C)       Physical Exam   Constitutional: She is oriented to person, place, and time. No distress.   Obese   HENT:   Head: Atraumatic.   Eyes: No scleral icterus.   Neck: Normal range of motion. Neck supple.   Cardiovascular: Normal rate, regular rhythm, normal heart sounds and intact distal pulses.   Pulmonary/Chest: Effort normal and breath sounds normal.   Abdominal: Soft. Bowel sounds are normal.   Obese    Neurological: She is alert and oriented to person, place, and time.   Skin: Skin is warm and dry. Capillary refill takes less than 2 seconds.   Previous right breast exam shows an easily palpable mass at the 3 o'clock position.    There were no frankly palpable axillary lymph nodes   Psychiatric: She has a normal mood and affect. Her behavior is normal. Judgment normal.   Vitals reviewed.    Supplemental Diagnosis  Immunohistochemical stains for hormonal receptors are completed on the tumor cells and reveal the following:  Estrogen receptor: Positive; strong nuclear staining over 95% of tumor cells.  Progesterone receptor: Negative; less than 1% the nuclear staining in tumor cells.  Her2: Equivocal (Stain score = 2+). For this specimen will be sent for HER2 analysis by FISH and results will  follow in a supplemental report.  Ki-67: Positive nuclear staining in 70% of tumor cells.  All immunohistochemical stains have satisfactory positive and negative controls.  ER,PGR,LEM2GTV  (Electronically Signed: 2019-03-12 11:04:42 )  Diagnosed by: Balbina Kyle M.D.  FINAL PATHOLOGIC DIAGNOSIS  1. BREAST, RIGHT, LOWER OUTER 08:00, ULTRASOUND-GUIDED BIOPSY:  Invasive lobular carcinoma of breast.  Size of invasive carcinoma: 19 mm in greatest linear dimension within a single core biopsy fragment.  Horace Histologic Score: Grade 2 of 3.  ER,PGR,[id number]  [id number]  Tubule formation: 3  Nuclear pleomorphism: 2  Mitoses: 1  Associated lobular carcinoma in situ (LCIS) is present.  No lymphovascular or perineural invasion.  Breast biomarkers are pending and will be included in the supplemental report.  2. AXILLA, RIGHT LYMPH NODES, ULTRASOUND-GUIDED BIOPSY:  Benign lymph node without evidence of metastatic carcinoma.  Immunohistochemical stains (Cam 5.2 and CK7) are confirmatory.  NOTE: An e-cadherin immunohistochemical stains performed on specime      Assessment & Plan:    Patient Active Problem List   Diagnosis     Dyspnea and respiratory abnormalities    NILS (obstructive sleep apnea)    Class 3 severe obesity due to excess calories with serious comorbidity and body mass index (BMI) of 45.0 to 49.9 in adult    Hypertension    Malignant neoplasm of upper-outer quadrant of right breast in female, estrogen receptor positive       Plan:    1.  With regard to the malignant neoplasm the right breast, lumpectomy, sentinel node biopsy.  Axillary dissection only if extracapsular invasion or 3 positive lymph nodes were found.          The risks of surgery were discussed with the patient. These include infection, bleeding, seroma, hematoma, lymphedema, arm numbness.  The rationale for axillary dissection was discussed.           Repairing contrast lumpectomy with radiation versus mastectomy with or without reconstruction.          Patient will need oncology consultations preoperatively if the HER2 was positive in postoperatively if was negative    2.  Possible MediPort placement.  This would be done if the HER2 was positive.    3.  Postop monitoring of her obstructive sleep apnea    4.  Home hypertensive medication    5..  The patient would need to discuss weight loss with her primary care doctor

## 2019-03-14 NOTE — PROGRESS NOTES
Patient ID: Susu Luther is a 69 y.o. female.    Right breast cancer    Chief Complaint: Results (breast bx)      HPI:  Patient discovered a lump in her right breast.  This led to evaluation by mammogram and ultrasound.  She was found to have a category 5 mammogram on breast imaging studies.  A core biopsy was done that showed a ER positive, progesterone receptor negative lobular breast cancer.  Evaluation of the enlarged lymph nodes by biopsy was negative.    Patient presents now to discuss surgical options.  She is interested in breast conservation therapy.            Review of Systems   Constitutional: Negative for appetite change, chills, fatigue, fever and unexpected weight change.   HENT: Negative for hearing loss and rhinorrhea.    Eyes: Negative for visual disturbance.   Respiratory: Positive for shortness of breath. Negative for apnea, cough and wheezing.    Cardiovascular: Negative for chest pain and palpitations.   Gastrointestinal: Negative for abdominal distention, abdominal pain, blood in stool, constipation, diarrhea, nausea and vomiting.   Genitourinary: Negative for dysuria, frequency and urgency.   Musculoskeletal: Positive for neck pain. Negative for arthralgias.   Skin: Negative for rash.   Neurological: Negative for seizures, weakness, numbness and headaches.   Hematological: Negative for adenopathy. Does not bruise/bleed easily.   Psychiatric/Behavioral: Negative for hallucinations. The patient is not nervous/anxious.        Current Outpatient Medications   Medication Sig Dispense Refill    amLODIPine (NORVASC) 10 MG tablet TK 1 T PO QD  1    butalbital-acetaminophen-caff -40 mg -40 mg Cap       cholecalciferol, vitamin D3, 50,000 unit capsule   0    cyanocobalamin 1,000 mcg/mL injection   0    doxazosin (CARDURA) 4 MG tablet       lorazepam (ATIVAN) 1 MG tablet Take 1 mg by mouth 3 (three) times daily.  0     Current Facility-Administered Medications   Medication Dose  Route Frequency Provider Last Rate Last Dose    lidocaine (PF) 10 mg/ml (1%) injection 10 mg  1 mL Intradermal Once Artemio Starks MD           Review of patient's allergies indicates:   Allergen Reactions    Pcn [penicillins] Rash       Past Medical History:   Diagnosis Date    Hypertension        Past Surgical History:   Procedure Laterality Date     SECTION      OOPHORECTOMY      right        Family History   Problem Relation Age of Onset    Diabetes Maternal Grandmother     Diabetes Maternal Grandfather     Diabetes Mother     Breast cancer Neg Hx     Colon cancer Neg Hx     Ovarian cancer Neg Hx        Social History     Socioeconomic History    Marital status:      Spouse name: Not on file    Number of children: Not on file    Years of education: Not on file    Highest education level: Not on file   Social Needs    Financial resource strain: Not on file    Food insecurity - worry: Not on file    Food insecurity - inability: Not on file    Transportation needs - medical: Not on file    Transportation needs - non-medical: Not on file   Occupational History    Not on file   Tobacco Use    Smoking status: Never Smoker    Smokeless tobacco: Never Used   Substance and Sexual Activity    Alcohol use: No    Drug use: No    Sexual activity: Yes     Partners: Male     Birth control/protection: Post-menopausal   Other Topics Concern    Not on file   Social History Narrative    Not on file       Vitals:    19 0954   BP: (!) 137/93   Pulse: 95   Temp: 97.9 °F (36.6 °C)       Physical Exam   Constitutional: She is oriented to person, place, and time. No distress.   Obese   HENT:   Head: Atraumatic.   Eyes: No scleral icterus.   Neck: Normal range of motion. Neck supple.   Cardiovascular: Normal rate, regular rhythm, normal heart sounds and intact distal pulses.   Pulmonary/Chest: Effort normal and breath sounds normal.   Abdominal: Soft. Bowel sounds are normal.   Obese    Neurological: She is alert and oriented to person, place, and time.   Skin: Skin is warm and dry. Capillary refill takes less than 2 seconds.   Previous right breast exam shows an easily palpable mass at the 3 o'clock position.    There were no frankly palpable axillary lymph nodes   Psychiatric: She has a normal mood and affect. Her behavior is normal. Judgment normal.   Vitals reviewed.    Supplemental Diagnosis  Immunohistochemical stains for hormonal receptors are completed on the tumor cells and reveal the following:  Estrogen receptor: Positive; strong nuclear staining over 95% of tumor cells.  Progesterone receptor: Negative; less than 1% the nuclear staining in tumor cells.  Her2: Equivocal (Stain score = 2+). For this specimen will be sent for HER2 analysis by FISH and results will  follow in a supplemental report.  Ki-67: Positive nuclear staining in 70% of tumor cells.  All immunohistochemical stains have satisfactory positive and negative controls.  ER,PGR,[id number]  (Electronically Signed: 2019-03-12 11:04:42 )  Diagnosed by: Balbina Kyle M.D.  FINAL PATHOLOGIC DIAGNOSIS  1. BREAST, RIGHT, LOWER OUTER 08:00, ULTRASOUND-GUIDED BIOPSY:  Invasive lobular carcinoma of breast.  Size of invasive carcinoma: 19 mm in greatest linear dimension within a single core biopsy fragment.  Horace Histologic Score: Grade 2 of 3.  ER,PGR,[id number]  [id number]  Tubule formation: 3  Nuclear pleomorphism: 2  Mitoses: 1  Associated lobular carcinoma in situ (LCIS) is present.  No lymphovascular or perineural invasion.  Breast biomarkers are pending and will be included in the supplemental report.  2. AXILLA, RIGHT LYMPH NODES, ULTRASOUND-GUIDED BIOPSY:  Benign lymph node without evidence of metastatic carcinoma.  Immunohistochemical stains (Cam 5.2 and CK7) are confirmatory.  NOTE: An e-cadherin immunohistochemical stains performed on specime      Assessment & Plan:    Patient Active Problem List   Diagnosis     Dyspnea and respiratory abnormalities    NILS (obstructive sleep apnea)    Class 3 severe obesity due to excess calories with serious comorbidity and body mass index (BMI) of 45.0 to 49.9 in adult    Hypertension    Malignant neoplasm of upper-outer quadrant of right breast in female, estrogen receptor positive       Plan:    1.  With regard to the malignant neoplasm the right breast, lumpectomy, sentinel node biopsy.  Axillary dissection only if extracapsular invasion or 3 positive lymph nodes were found.          The risks of surgery were discussed with the patient. These include infection, bleeding, seroma, hematoma, lymphedema, arm numbness.  The rationale for axillary dissection was discussed.           Repairing contrast lumpectomy with radiation versus mastectomy with or without reconstruction.          Patient will need oncology consultations preoperatively if the HER2 was positive in postoperatively if was negative    2.  Possible MediPort placement.  This would be done if the HER2 was positive.    3.  Postop monitoring of her obstructive sleep apnea    4.  Home hypertensive medication    5..  The patient would need to discuss weight loss with her primary care doctor

## 2019-03-14 NOTE — PATIENT INSTRUCTIONS
"Surgery scheduled for March 27th at approximately 9 in the morning.  The hospital call you the night before with a time of arrival.    There are no medications that you have to stop.    No solid food after midnight but you may have clear liquids up to 3 hr before the start time of your surgery    We will let you know if any of your labs or EKGs are abnormal.      I will call you with the HER2 results once they are available.  If they are positive we will ask you to see the oncologist prior to surgery to discuss chemotherapy.    If you have any questions please let us    [facility]  Instructions for Patients and Families    You are invited to share your experience with me and my staff.  If you receive a survey in the mail, please return it at your convenience, or complete a brief survey on [employer].  We value your opinion!        Did you know that [facility] can be used to make appointments?  Just select "Schedule Appointment" under the "Visits" menu.    Notify you if any of your preop laboratories show abnormalities.  I    Before surgery:  The pre-op nurse from the hospital will call you before the day of your surgery to confirm your arrival time.  The time of your surgery may change due to emergencies or other unforeseen events.  Do not eat or drink anything after midnight the night before your procedure, except for clear liquids up to three hours before your surgery time, and sips of water with medication.  If you are not diabetic, it is recommended that you drink a glass of clear fruit juice (apple, grape, cranberry, not orange) three hours before your surgery time, but nothing after that.  If you are diabetic, you may have water or sugar-free clear liquids such as Crystal Light up to three hours before your surgery time.    Day of Surgery:  · You will go to a pre-op area where an IV will be started and you will speak to the anesthesiologist and surgeon.  · Your family will be " updated throughout the operation.  After surgery, your family member may be taken to a private room for consultation with the surgeon.  This is for the privacy of your medical information and does not necessarily mean there is anything wrong.    If your incisions have:  · Glue:  You may take a bath or shower immediately and wash your skin as you normally do.  The glue will eventually crumble or peel off. Do not let your incisions soak under water.  · Strips: Leave them on, but it is OK if they fall off on their own. It is OK to get them wet 48 hours after surgery.  · Bandage: You may remove it 2 days after your surgery, and then you may leave the incision open and take a shower or bath, unless otherwise instructed. If your bandage has clear plastic, you may shower with it on and then remove it 2 days after surgery.    Activities  · Walking is recommended after surgery; bed rest is not recommended unless specifically ordered.  · If you have had abdominal surgery, do not lift over 20 pounds for 4 weeks after surgery.  · If you have had hernia surgery, do not lift over 20 pounds for 6 weeks after surgery.  · You may drive when you are off your post-operative pain medication.  · Do not smoke after surgery, it decreases your ability to heal and increases the risk of infection and pneumonia.    Diet:  Drink lots of fluids after surgery.  You might not have much of an appetite at first, you may eat regular food when you feel ready, unless you are given special diet instructions.    Post-operative symptoms and medications  · It is safe to take over-the-counter medications for constipation, heartburn, sleep, or itching if needed.  Prescription pain medication may contain acetaminophen (Tylenol), so you should not take additional acetaminophen (Tylenol) at the same time as your pain medication.  · You may experience nausea, low fever/chills, and clear drainage from your incision, sometimes up to a month after surgery.  Notify  "our office if you have fever over 101 degrees, worsening redness around your incision, thick cloudy drainage, or inability to drink any liquids.  · You will experience some level of pain after surgery.  Your pain medication should help with the pain, but may not be able to eliminate it entirely.  Pain will decrease with time, and most pain will be gone by 4 to 6 weeks after surgery.  · We are not able to call in prescriptions for pain medication after hours or on weekends.  If your pain medication is ineffective or you will run out soon and need a refill, please call our office at [phone].  We are not able to replace pain medication that has been lost or stolen.    After surgery, you will either be discharged home or admitted to the hospital.  If you are admitted to the hospital, one of the surgeons or a physician assistant will see you once a day.  Due to scheduled surgery, we may see you in the afternoon or at night; however, your nurse is able to page us at any time.  If you feel there is a situation that is not being addressed properly, please dial [phone] from the phone in your room.    Follow-up appointment  · You will see your surgeon or a physician assistant in clinic for a follow-up appointment at either our [facility] (off [facility]) or [facility] (off [facility]) locations, usually between one and four weeks after your surgery.  · The hospital nurses can make your follow up appointment, or you can make it online at [email or web address] or call [phone].  · If you have a smartphone with the NI sasha, please let us know if you would like to do a phone visit instead of a post-op office visit.    If you are signed up for [facility], install the "NI" sasha to access your test results, send messages to your doctors, and schedule appointments from your smartphone!    "

## 2019-03-15 PROBLEM — I10 HYPERTENSION: Chronic | Status: ACTIVE | Noted: 2019-02-28

## 2019-03-15 PROBLEM — Z17.0 MALIGNANT NEOPLASM OF UPPER-OUTER QUADRANT OF RIGHT BREAST IN FEMALE, ESTROGEN RECEPTOR POSITIVE: Chronic | Status: ACTIVE | Noted: 2019-03-14

## 2019-03-15 PROBLEM — E66.813 CLASS 3 SEVERE OBESITY DUE TO EXCESS CALORIES WITH SERIOUS COMORBIDITY AND BODY MASS INDEX (BMI) OF 45.0 TO 49.9 IN ADULT: Chronic | Status: ACTIVE | Noted: 2018-11-28

## 2019-03-15 PROBLEM — E66.01 CLASS 3 SEVERE OBESITY DUE TO EXCESS CALORIES WITH SERIOUS COMORBIDITY AND BODY MASS INDEX (BMI) OF 45.0 TO 49.9 IN ADULT: Chronic | Status: ACTIVE | Noted: 2018-11-28

## 2019-03-15 PROBLEM — C50.411 MALIGNANT NEOPLASM OF UPPER-OUTER QUADRANT OF RIGHT BREAST IN FEMALE, ESTROGEN RECEPTOR POSITIVE: Chronic | Status: ACTIVE | Noted: 2019-03-14

## 2019-03-15 PROBLEM — G47.33 OSA (OBSTRUCTIVE SLEEP APNEA): Chronic | Status: ACTIVE | Noted: 2018-11-28

## 2019-03-15 NOTE — PRE-PROCEDURE INSTRUCTIONS
Pre op instructions reviewed with patient per phone:    To confirm, Your surgeon has instructed you:  Surgery is scheduled 3/27/19 at 0900.  Pre admit office to call afternoon prior to surgery with final arrival time.  If surgery is on Monday, Pre admit office to call Friday afternoon with with final arrival time      Please report to [facility] 1[facility] floor main lobby by 0630 To Rad @ 0800.      INSTRUCTIONS IMPORTANT!!!  ¨ No smoking after 12 midnight, the night before surgery.  ¨ No solid food after 12 midnight, but you may have clear liquids up until 3 hours prior to surgery.  This includes: grape, cranberry, and apple juice (not orange, and no coffee.)   ¨ OK to brush teeth, but no gum, candy or mints!    ¨ Take only these medicines with a small swallow of water-morning of surgery.  Amlodipine, Cardura, Ativan    ____  Do not wear makeup, including mascara.  ____  No powder, lotions or creams to surgical area.  ____  Please remove all jewelry, including piercings and leave at home.  ____  No money or valuables needed. Please leave at home.  ____  Please bring identification and insurance information to hospital.  ____  If going home the same day, arrange for a ride home. You will not be able to   drive if Anesthesia was used.  ____  Children, under 12 years old, must remain in the waiting room with an adult.  They are not allowed in patient areas.  ____  Wear loose fitting clothing. Allow for dressings, bandages.  ____  Stop Aspirin, Ibuprofen, Motrin and Aleve at least 5-7 days before surgery, unless otherwise instructed by your doctor, or the nurse.   You MAY use Tylenol/acetaminophen until day of surgery.  ____  If you take diabetic medication, do not take am of surgery unless instructed by   Doctor.  ____ Stop taking any Fish Oil supplement or any Vitamins that contain Vitamin E at least 5 days prior to surgery.          Bathing Instructions-- The night before surgery and the morning  prior to coming to the hospital:   -Do not shave the surgical area.   -Shower and wash your hair and body as usual with your regular soap and shampoo.   -Rinse your hair and body completely.   -Use one packet of hibiclens to wash the surgical site (using your hand) gently for 5 minutes.  Do not scrub you skin too hard.   -Do not use hibiclens on your head, face, or genitals.   -Do not wash with regular soap after you use the hibiclens.   -Rinse your body thoroughly.   -Dry with clean, soft towel.  Do not use lotion, cream, deodorant, or powders on   the surgical site.    Use antibacterial soap in place of hibiclens if your surgery is on the head, face or genitals.         Surgical Site Infection    Prevention of surgical site infections:     -Keep incisions clean and dry.   -Do not soak/submerge incisions in water until completely healed.   -Do not apply lotions, powders, creams, or deodorants to site.   -Always make sure hands are cleaned with antibacterial soap/ alcohol-based   prior to touching the surgical site.  (This includes doctors, nurses, staff, and yourself.)    Signs and symptoms:   -Redness and pain around the area where you had surgery   -Drainage of cloudy fluid from your surgical wound   -Fever over 100.4  I have read or had read and explained to me, and understand the above information.

## 2019-03-19 ENCOUNTER — CLINICAL SUPPORT (OUTPATIENT)
Dept: REHABILITATION | Facility: HOSPITAL | Age: 69
End: 2019-03-19
Attending: SURGERY
Payer: COMMERCIAL

## 2019-03-19 DIAGNOSIS — Z17.0 MALIGNANT NEOPLASM OF UPPER-OUTER QUADRANT OF RIGHT BREAST IN FEMALE, ESTROGEN RECEPTOR POSITIVE: Primary | ICD-10-CM

## 2019-03-19 DIAGNOSIS — C50.411 MALIGNANT NEOPLASM OF UPPER-OUTER QUADRANT OF RIGHT BREAST IN FEMALE, ESTROGEN RECEPTOR POSITIVE: Primary | ICD-10-CM

## 2019-03-19 PROCEDURE — 97166 OT EVAL MOD COMPLEX 45 MIN: CPT

## 2019-03-19 PROCEDURE — 97110 THERAPEUTIC EXERCISES: CPT

## 2019-03-19 NOTE — PROGRESS NOTES
OUTPATIENT OCCUPATIONAL THERAPY   EVALUATION    Name: Susu Luther  Clinic Number: [id number]    Therapy Diagnosis: R breast CA   Physician: Artemio Starks MD    Physician Orders: OT Preop  Consulttion  Medical Diagnosis: C50.411,Z17.0 (ICD-10-CM) - Malignant neoplasm of upper-outer quadrant of right breast in female, estrogen receptor positive, HI -  Evaluation Date: 3/19/2019  Authorization period Expiration: 3/13/2020  Plan of Care Certification Period: 6/11/19  Insurance: [facility]    Visit #: 1/ Visits authorized: 1  Time In:2:00 pm  Time Out: 3:00 pm  Total Billable Time: 60 minutes    Precautions: cancer    History   History of Present Illness: Susu is a 69 y.o. female that presents to  [facility] secondary to dx of R breast cancer.  Pt is scheduled for R breast lumpectomy with setinel node biopsy  Dx:  Malignant neoplasm of upper-outer quadrant of right breast in female, estrogen receptor positive, HI -  Evaluation Date: 3/19/2019    Surgery date: 3/27/19      Pt presents today  to perform baseline measurements pre-surgery to be able to detect lymphedema post surgery, UE muscle testing, postural and ROM assessment along with education of risk of lymphedema and surgical precautions post surgery. Circumferential measurements will also be taken pre-surgery of BL UEs for early detection of lymphedema post surgery. Pt will also be instructed in exercises to perform pre-surgery to insure best outcomes post surgery.     Pt presents today for baseline measurements to aid in the early detection of lymphedema, UE muscle testing, postural and ROM assessment along with education of risk of lymphedema and surgical precautions post surgery. Circumferential measurements will be taken today of BL UEs for early detection of lymphedema post surgery. Pt will also be instructed in exercises to perform pre and post-surgery to insure best outcomes.     Past Medical History:   Past  Medical History:   Diagnosis Date    Encounter for blood transfusion     Hypertension     Malignant neoplasm of upper-outer quadrant of right breast in female, estrogen receptor positive 3/14/2019       Past Surgical History:   Susu Luther  has a past surgical history that includes Oophorectomy;  section; and Appendectomy.    Medications:  Susu has a current medication list which includes the following prescription(s): amlodipine, butalbital-acetaminophen-caff -40 mg, cholecalciferol (vitamin d3), cyanocobalamin, doxazosin, and lorazepam, and the following Facility-Administered Medications: lidocaine (pf) 10 mg/ml (1%).    Allergies:  Review of patient's allergies indicates:   Allergen Reactions    Pcn [penicillins] Rash          Hand dominance: R hand dominant  Prior Therapy: R shoulder therapy several years prior  Nutrition:  Obese  Social History: Pt lives with spouse  Place of Residence (Steps/Adaptations): One story house  Current functional status:  I with ADLs but does not do meal prep, shopping, or house work  Exercise routine prior to onset : none  DME owned: straight cane  Work:  Retired                         Subjective   Pt states: she injured R shoulder after a fall 4-5 years prior and had PT in [place]. Used all of her visits but had no improvement. She has had these limitations x approx 5 years.  does most of housework and meal prep as she cannot.   Pain: 8/10 on VAS. average    Objective   Mental status :alert, oriented, attentive    Posture/Alignment   Postural examination/scapula alignment: Forward head posture/ shoulders rolled forward  Joint integrity: WFLs  Skin integrity: intact  Edema: none noted    Sensation: Light Touch: Intact; reports paresthesias in R hand, begins in shoulder, radiates into dorsal FA, and goes to sleep at night.            Proprioception: Intact  - appearance: well groomed     ROM:   UPPER EXTREMITY--AROM/PROM     Shoulder Range of  "Motion:   ACTIVE ROM LEFT RIGHT   Flexion 140 130   Abduction 130 98   Horizontal Abd NT 80   Extension 35 45   IR 45 40   ER 40 15   IR/90deg 30 NT due to pain with abd   ER/90deg 70 NT due to pain with abd     Strength: manual muscle test grades below   Upper Extremity Strength   (L) UE (R) UE   Shoulder flexion: 3+/5 3+/5   Shoulder Abduction: 3+/5 3-/5   Shoulder IR 3+/5 3+/5   Shoulder ER 3+/5 3+/5   Elbow flexion: 4/5 4/5   Elbow extension: 4/5 4/5   Lower Trap: 3+/5 NT   Middle Trap: 3+/5 NT    5/5 4+/5       Baseline Measurements of BL UE's for early detection of Lymphedema:     LANDMARK RIGHT UE LEFT UE DIFFERENCE   E + 8" 50 cm 47.7 cm 2.3 cm   E + 6" 52 cm 53.1 cm 1.1 cm   E + 4" 51 cm 51.2 cm 0.2 cm   E + 2" 44.8 cm 44.5 cm 0.3 cm   Elbow 31.2 cm 31.9 cm 0.7 cm   W+ 8" 33.0 cm 32.5 cm 0.5 cm   W +  6" 30 cm 29.0 cm 1.0 cm   W + 4" 25.6 cm 24.8 cm 0.8 cm   Wrist 19.5 cm 19.0 cm 0.5 cm   DPC 21.0 cm 21.2 cm 0.2 cm   IP Thumb 6.6 cm 6.5 cm 0.1 cm                   Coordination:   - fine motor: WFL  - UE coordination: intact     - LE coordination:  Not tested     Functional Mobility (Bed mobility, transfers)  Bed mobility: I =  independent   Roll to left: I  Roll to right: I  Supine to prone: I  Scooting to edge of bed: I  Supine to sit: I  Sit to supine: mod A  Transfers to bed: I  Transfers to toilet: I  Sit to stand:  Painful in R arm to push sit to stand  Stand pivot:  I  Car transfers:  Min A      ADL's:  Feeding: I = independent   Grooming: Difficulty combing hair, donning deoderant  Hygiene:   UB Dressing: Modified  LB Dressing: I  Toileting: Modified wiping to L hand  Bathing: Difficulty washing left arm and back and washing hair    Gait Assessment:   - AD used: none  - Assistance: independent but labored  - Distance: SOB with standing during objective measures as well as walking from waiting room to treatment room.        Endurance Deficit: none      Patient Education   - role of OT in multi - " disciplinary team, goals for OT  - Pt was educated in lymphedema etiology and management plans.    - Pt was provided with written risk reductions and precautions for managing lymphedema.   - Reviewed GEOFF drain care instructions.     ROM/lifting Precautions post surgery discussed -  until drains have been removed:  - do not lift affected arm above 90 degrees of shoulder flexion  - do not lift over 5 lbs  - do not pull or push heavy objects  - do not sleep on your stomach or surgery side     Written Home Exercises Provided and Patient Education: Handouts given   Pt was instructed in and performed therapeutic exercise for postural correction and alignment, stretching and soft tissue mobility, and strengthening.     Exercises included: handout given    - exaggerated deep breathing and relaxation  - scapular retractions  - pendulums swings  -ball squeezes  -shoulder rolls  - elbow flexion/extension    Discussed walking program goals of 30 min 5x/week but recommended starting with 2-3 min 2-3x day and increasing in 1-2 min sessions as able due to SOB.     Pt was able to demonstrate and report understanding and performance    Pt has no cultural, educational or language barriers to learning provided.    30 min total    Functional Limitations Reporting     Category: Self Care  Tool: Quick DASH Score: 72 Limitation        Modifier  Impairment Limitation Restriction    CH  0 % impaired, limited or restricted    CI  @ least 1% but less than 20% impaired, limited or restricted    CJ  @ least 20%<40% impaired, limited or restricted    CK  @ least 40%<60% impaired, limited or restricted    CL  @ least 60% <80% impaired, limited or restricted    CM  @ least 80%<100% impaired limited or restricted    CN  100% impaired, limited or restricted     Assessment   This is a 69 y.o. female referred to outpatient occupational therapy and presents with a medical diagnosis of R ER+ NH- breast cancer and was seen today pre-operatively to assess  strength and ROM of BL UEs, to take baseline circumferential measurements of BL UEs to aid in the early detection of lymphedema and provide pt education on exercises/precations post breast surgery. Pt does exhibit chronic  moderate +ROM impairments at R shoulder from previous injury and reports no progress with previous therapy.   Pt educated in lymphedema risks/precautions as well as ROM/lifting precautions post surgery - pt demonstrated/verbalized understanding. No goals established this visit as goals for OT will be established post surgery at follow up.      Anticipated barriers to occupational therapy: chronic R shoulder limitations    Pt has no cultural, educational or language barriers to learning provided.    Profile and History Assessment of Occupational Performance Level of Clinical Decision Making Complexity Score   Occupational Profile:   Susu Luther is a 69 y.o. female who lives with their spouse and is currently retired. Susu Luther has difficulty with  feeding, bathing, grooming and dressing  shopping and housework/household chores  affecting his/her daily functional abilities. His/her main goal for therapy is preop education/instructions.     Comorbidities:   difficulty sleeping, HTN and chronic R shoulder impairments    Medical and Therapy History Review:   Brief               Performance Deficits    Physical:  Joint Mobility  Muscle Power/Strength  Muscle Endurance  Gross Motor Coordination  Pain    Cognitive:  No Deficits    Psychosocial:    Habits     Clinical Decision Making:  moderate    Assessment Process:  Detailed Assessments    Modification/Need for Assistance:  Minimal-Moderate Modifications/Assistance    Intervention Selection:  Several Treatment Options       moderate  Based on PMHX, co morbidities , data from assessments and functional level of assistance required with task and clinical presentation directly impacting function.           Plan   Schedule patient for  follow up with Occupational therapy post surgery. Goals for therapy post surgery will be established at that time.     Therapist: EDDI Shaver CHT  3/19/2019

## 2019-03-19 NOTE — PLAN OF CARE
PRE/POST OP PATIENT EDUCATION    Post Operative Instructions     Range of Motion/lifting Precautions post surgery  The following activities should be avoided until your drain(s) have been removed  - do not lift affected arm above 90 degrees of shoulder flexion  - do not lift over 5 lbs  - do not pull or push heavy objects  - do not sleep on your stomach or surgery side       After surgery, you may begin self-care tasks including grooming, dressing, feeding and simple hygiene as soon as you feel up to it.    Schedule your post-op therapy follow-up after your drains have been removed     When to call your doctor   - if any part of your affected arm or axilla feels hot, is reddened or has increased swelling   - if you develop a temperature over 101 degrees Fahrenheit      Lymphedema - Identification and Prevention     Lymphedema - is the swelling of a body area or extremity caused by the accumulation of lymphatic fluid.  There is a risk for lymphedema with the removal of lymph nodes, trauma or radiation therapy.  Treatment of breast cancer often involves surgery: mastectomy or lumpectomy. Some of the lymph nodes in the underarm (called axillary lymph nodes) may be removed and checked to see if they contain cancer cells.     During breast surgery when axillary lymph nodes are removed (with sentinel node biopsy or axillary dissection) or are treated with radiation therapy, the lymphatic system may become impaired. This may prevent lymphatic fluid from leaving the area therefore, causing lymphedema.     Lymphatic fluid is a normal part of the circulatory system. Its function is to remove waste products and to produce cells vital to fighting infection. Swelling occurs when the vessels become restricted and the lymphatic fluid is unable to freely flow through them.  If lymphedema is left untreated, the affected limb could progressively become more swollen, which could lead to hardening  of the skin, bulkiness in the limb, infection and impaired wound healing.         There are things you can do to decrease the chance of developing lymphedema.                                          [email or web address]                                                                                                                                                  The information presented is intended for general information and educational purposes. It is not intended to replace the  advice of your health care provider. Contact your health care provider if you believe you have a health problem.                                                    POST OP EXERCISES - SAFE TO DO THE FIRST 2 WEEKS AFTER SURGERY UNTIL YOUR FOLLOW UP APPOINTMENT WITH PHYSICAL/OCCUPATIONAL THERAPY    Scapular Retraction (Standing)    With arms at sides, squeeze shoulder blades together. Do not shrug shoulders and do not hold your breath. Hold 5 seconds. Repeat 15 times 3 sessions 1-2 x day.       Exaggerated Breathing and Relaxation      Practice deep breathing frequently in the first few days following surgery even before you begin exercising. This exercise helps with tissue extensibility in the chest wall.  Inhale slowly and deeply through the nose and exhale through pursed lips. Concentrate on relaxing as you let the air out of your lungs. Repeat three (3) to four (4) times, remembering to breath in deeply and then relaxing. This exercise helps to ease the sensation of pulling and discomfort that may be experienced while exercising.      Ball Squeeze OR Hand pumps       Perform this exercise three (3) times a day for 1-3 minutes each time.    The ball squeeze or hand pumps helps to prevent or reduce temporary swelling that may occur in the affected arm. This exercise may be performed standing, sitting or while lying in bed. During this exercise the affected arm should be slightly bent and held upward. Support your arm with a  pillow if you are uncomfortable holding it up.    a. Hold a rubber ball in your hand on the affected side and lift your arm upward.  b. Alternate squeezing and relaxing the ball.      Pendulum Swing      Perform this exercise (2) times a day with ten (10) repetitions.    a. Rest your other hand on the back of a chair or table to steady yourself. Bend forward at the wrist with the involved arm hanging freely toward the floor.  b. Gently swing the involved arm forward and backward, gradually increasing the size of the swing.  c. Next, gently swing the involved arm side to side, gradually increasing the size of the swing.  d. Then make small circles with the involved arm and gradually increase the size of the [place]. Repeat making circles in the opposite direction beginning with small circles and gradually increasing the [place] size.          AROM: Elbow Flexion / Extension        With left hand palm up, gently bend elbow as far as possible. Then straighten arm as far as possible. Do this in standing.   Repeat 15 times per set. Do 3 sets per session. Do 1-3 sessions per day.

## 2019-03-22 ENCOUNTER — TELEPHONE (OUTPATIENT)
Dept: SURGERY | Facility: HOSPITAL | Age: 69
End: 2019-03-22

## 2019-03-22 NOTE — TELEPHONE ENCOUNTER
Left a message for the patient's explaining that her estrogen receptors were positive, progesterone receptors were negative and her HER2 receptors were also negative. We will proceed with surgery with Medical Oncology consultation postoperatively

## 2019-03-26 ENCOUNTER — ANESTHESIA EVENT (OUTPATIENT)
Dept: SURGERY | Facility: HOSPITAL | Age: 69
End: 2019-03-26
Payer: COMMERCIAL

## 2019-03-27 ENCOUNTER — HOSPITAL ENCOUNTER (OUTPATIENT)
Facility: HOSPITAL | Age: 69
Discharge: HOME | End: 2019-03-27
Attending: SURGERY | Admitting: SURGERY
Payer: COMMERCIAL

## 2019-03-27 ENCOUNTER — HOSPITAL ENCOUNTER (OUTPATIENT)
Dept: RADIOLOGY | Facility: HOSPITAL | Age: 69
Discharge: HOME | End: 2019-03-27
Attending: SURGERY
Payer: COMMERCIAL

## 2019-03-27 ENCOUNTER — ANESTHESIA (OUTPATIENT)
Dept: SURGERY | Facility: HOSPITAL | Age: 69
End: 2019-03-27
Payer: COMMERCIAL

## 2019-03-27 DIAGNOSIS — C50.411 MALIGNANT NEOPLASM OF UPPER-OUTER QUADRANT OF RIGHT BREAST IN FEMALE, ESTROGEN RECEPTOR POSITIVE: ICD-10-CM

## 2019-03-27 DIAGNOSIS — Z17.0 MALIGNANT NEOPLASM OF UPPER-OUTER QUADRANT OF RIGHT BREAST IN FEMALE, ESTROGEN RECEPTOR POSITIVE: ICD-10-CM

## 2019-03-27 PROCEDURE — 19301 PARTIAL MASTECTOMY: CPT | Mod: AS,RT,, | Performed by: PHYSICIAN ASSISTANT

## 2019-03-27 PROCEDURE — 19301 PR MASTECTOMY, PARTIAL: ICD-10-PCS | Mod: RT,,, | Performed by: SURGERY

## 2019-03-27 PROCEDURE — 88305 TISSUE EXAM BY PATHOLOGIST: CPT | Performed by: PATHOLOGY

## 2019-03-27 PROCEDURE — 88341 TISSUE SPECIMEN TO PATHOLOGY - SURGERY: ICD-10-PCS | Mod: 26,,, | Performed by: PATHOLOGY

## 2019-03-27 PROCEDURE — 71000039 HC RECOVERY, EACH ADD'L HOUR: Performed by: SURGERY

## 2019-03-27 PROCEDURE — 88342 TISSUE SPECIMEN TO PATHOLOGY - SURGERY: ICD-10-PCS | Mod: 26,,, | Performed by: PATHOLOGY

## 2019-03-27 PROCEDURE — 25000003 PHARM REV CODE 250: Performed by: SURGERY

## 2019-03-27 PROCEDURE — 38525 PR BIOPSY/REM LYMPH NODES, AXILLARY: ICD-10-PCS | Mod: 51,RT,, | Performed by: SURGERY

## 2019-03-27 PROCEDURE — 37000008 HC ANESTHESIA 1ST 15 MINUTES: Performed by: SURGERY

## 2019-03-27 PROCEDURE — 25000003 PHARM REV CODE 250: Performed by: ANESTHESIOLOGY

## 2019-03-27 PROCEDURE — 88305 TISSUE EXAM BY PATHOLOGIST: CPT | Mod: 26,,, | Performed by: PATHOLOGY

## 2019-03-27 PROCEDURE — 88341 IMHCHEM/IMCYTCHM EA ADD ANTB: CPT | Mod: 26,,, | Performed by: PATHOLOGY

## 2019-03-27 PROCEDURE — 38525 BIOPSY/REMOVAL LYMPH NODES: CPT | Mod: 51,RT,, | Performed by: SURGERY

## 2019-03-27 PROCEDURE — 37000009 HC ANESTHESIA EA ADD 15 MINS: Performed by: SURGERY

## 2019-03-27 PROCEDURE — 38900 PR INTRAOPERATIVE SENTINEL LYMPH NODE ID W DYE INJECTION: ICD-10-PCS | Mod: RT,,, | Performed by: SURGERY

## 2019-03-27 PROCEDURE — 88331 TISSUE SPECIMEN TO PATHOLOGY - SURGERY: ICD-10-PCS | Mod: 26,,, | Performed by: PATHOLOGY

## 2019-03-27 PROCEDURE — 25000003 PHARM REV CODE 250: Performed by: NURSE ANESTHETIST, CERTIFIED REGISTERED

## 2019-03-27 PROCEDURE — 88305 TISSUE SPECIMEN TO PATHOLOGY - SURGERY: ICD-10-PCS | Mod: 26,,, | Performed by: PATHOLOGY

## 2019-03-27 PROCEDURE — 88342 IMHCHEM/IMCYTCHM 1ST ANTB: CPT | Mod: 26,,, | Performed by: PATHOLOGY

## 2019-03-27 PROCEDURE — 88331 PATH CONSLTJ SURG 1 BLK 1SPC: CPT | Mod: 26,,, | Performed by: PATHOLOGY

## 2019-03-27 PROCEDURE — 88307 TISSUE EXAM BY PATHOLOGIST: CPT | Performed by: PATHOLOGY

## 2019-03-27 PROCEDURE — 63600175 PHARM REV CODE 636 W HCPCS: Performed by: NURSE ANESTHETIST, CERTIFIED REGISTERED

## 2019-03-27 PROCEDURE — 71000015 HC POSTOP RECOV 1ST HR: Performed by: SURGERY

## 2019-03-27 PROCEDURE — 36000707: Performed by: SURGERY

## 2019-03-27 PROCEDURE — 38900 IO MAP OF SENT LYMPH NODE: CPT | Mod: RT,,, | Performed by: SURGERY

## 2019-03-27 PROCEDURE — S0077 INJECTION, CLINDAMYCIN PHOSP: HCPCS | Performed by: SURGERY

## 2019-03-27 PROCEDURE — 63600175 PHARM REV CODE 636 W HCPCS: Mod: JW,JG | Performed by: SURGERY

## 2019-03-27 PROCEDURE — C1729 CATH, DRAINAGE: HCPCS | Performed by: SURGERY

## 2019-03-27 PROCEDURE — 36000706: Performed by: SURGERY

## 2019-03-27 PROCEDURE — 88307 TISSUE SPECIMEN TO PATHOLOGY - SURGERY: ICD-10-PCS | Mod: 26,,, | Performed by: PATHOLOGY

## 2019-03-27 PROCEDURE — A9520 TC99 TILMANOCEPT DIAG 0.5MCI: HCPCS

## 2019-03-27 PROCEDURE — 27201423 OPTIME MED/SURG SUP & DEVICES STERILE SUPPLY: Performed by: SURGERY

## 2019-03-27 PROCEDURE — 88307 TISSUE EXAM BY PATHOLOGIST: CPT | Mod: 26,,, | Performed by: PATHOLOGY

## 2019-03-27 PROCEDURE — 19301 PARTIAL MASTECTOMY: CPT | Mod: RT,,, | Performed by: SURGERY

## 2019-03-27 PROCEDURE — 19301 PR MASTECTOMY, PARTIAL: ICD-10-PCS | Mod: AS,RT,, | Performed by: PHYSICIAN ASSISTANT

## 2019-03-27 PROCEDURE — 71000033 HC RECOVERY, INTIAL HOUR: Performed by: SURGERY

## 2019-03-27 RX ORDER — METOCLOPRAMIDE HYDROCHLORIDE 5 MG/ML
10 INJECTION INTRAMUSCULAR; INTRAVENOUS EVERY 10 MIN PRN
Status: DISCONTINUED | OUTPATIENT
Start: 2019-03-27 | End: 2019-03-27 | Stop reason: HOSPADM

## 2019-03-27 RX ORDER — PROPOFOL 10 MG/ML
VIAL (ML) INTRAVENOUS
Status: DISCONTINUED | OUTPATIENT
Start: 2019-03-27 | End: 2019-03-27

## 2019-03-27 RX ORDER — BUPIVACAINE HYDROCHLORIDE 2.5 MG/ML
INJECTION, SOLUTION EPIDURAL; INFILTRATION; INTRACAUDAL
Status: DISCONTINUED | OUTPATIENT
Start: 2019-03-27 | End: 2019-03-27 | Stop reason: HOSPADM

## 2019-03-27 RX ORDER — HYDROMORPHONE HYDROCHLORIDE 2 MG/ML
1 INJECTION, SOLUTION INTRAMUSCULAR; INTRAVENOUS; SUBCUTANEOUS EVERY 4 HOURS PRN
Status: DISCONTINUED | OUTPATIENT
Start: 2019-03-27 | End: 2019-03-27 | Stop reason: HOSPADM

## 2019-03-27 RX ORDER — LIDOCAINE HCL/PF 100 MG/5ML
SYRINGE (ML) INTRAVENOUS
Status: DISCONTINUED | OUTPATIENT
Start: 2019-03-27 | End: 2019-03-27

## 2019-03-27 RX ORDER — MEPERIDINE HYDROCHLORIDE 50 MG/ML
12.5 INJECTION INTRAMUSCULAR; INTRAVENOUS; SUBCUTANEOUS ONCE AS NEEDED
Status: DISCONTINUED | OUTPATIENT
Start: 2019-03-27 | End: 2019-03-27 | Stop reason: HOSPADM

## 2019-03-27 RX ORDER — HYDROCODONE BITARTRATE AND ACETAMINOPHEN 7.5; 325 MG/1; MG/1
1 TABLET ORAL EVERY 4 HOURS PRN
Qty: 30 TABLET | Refills: 0 | Status: SHIPPED | OUTPATIENT
Start: 2019-03-27 | End: 2019-03-28

## 2019-03-27 RX ORDER — CLINDAMYCIN PHOSPHATE 900 MG/50ML
900 INJECTION, SOLUTION INTRAVENOUS
Status: COMPLETED | OUTPATIENT
Start: 2019-03-27 | End: 2019-03-27

## 2019-03-27 RX ORDER — ISOSULFAN BLUE 50 MG/5ML
INJECTION, SOLUTION SUBCUTANEOUS
Status: DISCONTINUED | OUTPATIENT
Start: 2019-03-27 | End: 2019-03-27 | Stop reason: HOSPADM

## 2019-03-27 RX ORDER — MORPHINE SULFATE 4 MG/ML
2 INJECTION, SOLUTION INTRAMUSCULAR; INTRAVENOUS EVERY 5 MIN PRN
Status: DISCONTINUED | OUTPATIENT
Start: 2019-03-27 | End: 2019-03-27 | Stop reason: HOSPADM

## 2019-03-27 RX ORDER — DEXAMETHASONE SODIUM PHOSPHATE 4 MG/ML
INJECTION, SOLUTION INTRA-ARTICULAR; INTRALESIONAL; INTRAMUSCULAR; INTRAVENOUS; SOFT TISSUE
Status: DISCONTINUED | OUTPATIENT
Start: 2019-03-27 | End: 2019-03-27

## 2019-03-27 RX ORDER — OXYCODONE HYDROCHLORIDE 5 MG/1
5 TABLET ORAL
Status: DISCONTINUED | OUTPATIENT
Start: 2019-03-27 | End: 2019-03-27 | Stop reason: HOSPADM

## 2019-03-27 RX ORDER — ONDANSETRON 2 MG/ML
INJECTION INTRAMUSCULAR; INTRAVENOUS
Status: DISCONTINUED | OUTPATIENT
Start: 2019-03-27 | End: 2019-03-27

## 2019-03-27 RX ORDER — HYDROCODONE BITARTRATE AND ACETAMINOPHEN 7.5; 325 MG/1; MG/1
1 TABLET ORAL EVERY 4 HOURS PRN
Status: DISCONTINUED | OUTPATIENT
Start: 2019-03-27 | End: 2019-03-27 | Stop reason: HOSPADM

## 2019-03-27 RX ORDER — SODIUM CHLORIDE 0.9 % (FLUSH) 0.9 %
3 SYRINGE (ML) INJECTION
Status: DISCONTINUED | OUTPATIENT
Start: 2019-03-27 | End: 2019-03-27 | Stop reason: HOSPADM

## 2019-03-27 RX ORDER — MIDAZOLAM HYDROCHLORIDE 1 MG/ML
INJECTION, SOLUTION INTRAMUSCULAR; INTRAVENOUS
Status: DISCONTINUED | OUTPATIENT
Start: 2019-03-27 | End: 2019-03-27

## 2019-03-27 RX ORDER — ONDANSETRON 8 MG/1
8 TABLET, ORALLY DISINTEGRATING ORAL EVERY 8 HOURS PRN
Status: DISCONTINUED | OUTPATIENT
Start: 2019-03-27 | End: 2019-03-27 | Stop reason: HOSPADM

## 2019-03-27 RX ORDER — FENTANYL CITRATE 50 UG/ML
INJECTION, SOLUTION INTRAMUSCULAR; INTRAVENOUS
Status: DISCONTINUED | OUTPATIENT
Start: 2019-03-27 | End: 2019-03-27

## 2019-03-27 RX ORDER — SODIUM CHLORIDE 0.9 % (FLUSH) 0.9 %
3 SYRINGE (ML) INJECTION EVERY 8 HOURS
Status: DISCONTINUED | OUTPATIENT
Start: 2019-03-27 | End: 2019-03-27 | Stop reason: HOSPADM

## 2019-03-27 RX ORDER — ROCURONIUM BROMIDE 10 MG/ML
INJECTION, SOLUTION INTRAVENOUS
Status: DISCONTINUED | OUTPATIENT
Start: 2019-03-27 | End: 2019-03-27

## 2019-03-27 RX ORDER — SODIUM CHLORIDE, SODIUM LACTATE, POTASSIUM CHLORIDE, CALCIUM CHLORIDE 600; 310; 30; 20 MG/100ML; MG/100ML; MG/100ML; MG/100ML
INJECTION, SOLUTION INTRAVENOUS CONTINUOUS
Status: DISCONTINUED | OUTPATIENT
Start: 2019-03-27 | End: 2019-03-27 | Stop reason: HOSPADM

## 2019-03-27 RX ORDER — ACETAMINOPHEN 500 MG
1000 TABLET ORAL ONCE
Status: COMPLETED | OUTPATIENT
Start: 2019-03-27 | End: 2019-03-27

## 2019-03-27 RX ORDER — SUCCINYLCHOLINE CHLORIDE 20 MG/ML
INJECTION INTRAMUSCULAR; INTRAVENOUS
Status: DISCONTINUED | OUTPATIENT
Start: 2019-03-27 | End: 2019-03-27

## 2019-03-27 RX ORDER — HYDROCODONE BITARTRATE AND ACETAMINOPHEN 10; 325 MG/1; MG/1
1 TABLET ORAL EVERY 6 HOURS PRN
Status: DISCONTINUED | OUTPATIENT
Start: 2019-03-27 | End: 2019-03-27 | Stop reason: HOSPADM

## 2019-03-27 RX ADMIN — SODIUM CHLORIDE, SODIUM LACTATE, POTASSIUM CHLORIDE, AND CALCIUM CHLORIDE: 600; 310; 30; 20 INJECTION, SOLUTION INTRAVENOUS at 08:03

## 2019-03-27 RX ADMIN — PROPOFOL 130 MG: 10 INJECTION, EMULSION INTRAVENOUS at 08:03

## 2019-03-27 RX ADMIN — LIDOCAINE HYDROCHLORIDE 100 MG: 20 INJECTION, SOLUTION INTRAVENOUS at 08:03

## 2019-03-27 RX ADMIN — ROCURONIUM BROMIDE 5 MG: 10 INJECTION, SOLUTION INTRAVENOUS at 08:03

## 2019-03-27 RX ADMIN — FENTANYL CITRATE 50 MCG: 50 INJECTION, SOLUTION INTRAMUSCULAR; INTRAVENOUS at 09:03

## 2019-03-27 RX ADMIN — ACETAMINOPHEN 1000 MG: 500 TABLET ORAL at 07:03

## 2019-03-27 RX ADMIN — ONDANSETRON 4 MG: 2 INJECTION, SOLUTION INTRAMUSCULAR; INTRAVENOUS at 09:03

## 2019-03-27 RX ADMIN — FENTANYL CITRATE 100 MCG: 50 INJECTION, SOLUTION INTRAMUSCULAR; INTRAVENOUS at 08:03

## 2019-03-27 RX ADMIN — CLINDAMYCIN IN 5 PERCENT DEXTROSE 900 MG: 18 INJECTION, SOLUTION INTRAVENOUS at 08:03

## 2019-03-27 RX ADMIN — OXYCODONE HYDROCHLORIDE 5 MG: 5 TABLET ORAL at 12:03

## 2019-03-27 RX ADMIN — MIDAZOLAM 2 MG: 1 INJECTION INTRAMUSCULAR; INTRAVENOUS at 08:03

## 2019-03-27 RX ADMIN — SUCCINYLCHOLINE CHLORIDE 140 MG: 20 INJECTION, SOLUTION INTRAMUSCULAR; INTRAVENOUS at 08:03

## 2019-03-27 RX ADMIN — DEXAMETHASONE SODIUM PHOSPHATE 8 MG: 4 INJECTION, SOLUTION INTRA-ARTICULAR; INTRALESIONAL; INTRAMUSCULAR; INTRAVENOUS; SOFT TISSUE at 09:03

## 2019-03-27 NOTE — TRANSFER OF CARE
"Anesthesia Transfer of Care Note    Patient: Susu Luther    Procedure(s) Performed: Procedure(s) (LRB):  LUMPECTOMY, BREAST (Right)  BIOPSY, LYMPH NODE, SENTINEL (Right)    Patient location: PACU    Anesthesia Type: general    Transport from OR: Transported from OR on room air with adequate spontaneous ventilation    Post pain: adequate analgesia    Post assessment: tolerated procedure well and no apparent anesthetic complications    Post vital signs: stable    Level of consciousness: awake    Nausea/Vomiting: no nausea/vomiting    Complications: none    Transfer of care protocol was followed      Last vitals:   Visit Vitals  BP (!) 142/86 (BP Location: Right arm, Patient Position: Sitting)   Pulse 90   Temp 36.8 °C (98.3 °F) (Temporal)   Resp (!) 24   Ht 5' 9" (1.753 m)   Wt (!) 145.5 kg (320 lb 12.3 oz)   SpO2 100%   Breastfeeding? No   BMI 47.37 kg/m²     "

## 2019-03-27 NOTE — INTERVAL H&P NOTE
The patient has been examined and the H&P has been reviewed:    I concur with the findings and no changes have occurred since H&P was written.    Anesthesia/Surgery risks, benefits and alternative options discussed and understood by patient/family.          Active Hospital Problems    Diagnosis  POA    Malignant neoplasm of upper-outer quadrant of right breast in female, estrogen receptor positive [C50.411, Z17.0]  Not Applicable     Chronic      Resolved Hospital Problems   No resolved problems to display.

## 2019-03-27 NOTE — ANESTHESIA PREPROCEDURE EVALUATION
03/27/2019  Susu Luther is a 69 y.o., female.    Anesthesia Evaluation    I have reviewed the Patient Summary Reports.    I have reviewed the Nursing Notes.   I have reviewed the Medications.     Review of Systems  Anesthesia Hx:  Denies Family Hx of Anesthesia complications.   Denies Personal Hx of Anesthesia complications.   Social:  Non-Smoker    Cardiovascular:   Hypertension ECG has been reviewed. Normal sinus rhythm  Left axis deviation  Minimal voltage criteria for LVH, may be normal variant  Abnormal ECG  No previous ECGs available  Confirmed by MD SARAH, BHARTI ([id number]) on 3/14/2019 8:11:29 PM   Pulmonary:   Shortness of breath Sleep Apnea    Neurological:  Neurology Normal    Endocrine:  Endocrine Normal        Physical Exam  General:  Obesity    Airway/Jaw/Neck:  Airway Findings: Mouth Opening: Normal Mallampati: II       Chest/Lungs:  Chest/Lungs Findings: Normal Respiratory Rate     Heart/Vascular:  Heart Findings: Normal            Anesthesia Plan  Type of Anesthesia, risks & benefits discussed:  Anesthesia Type:  general  Patient's Preference:   Intra-op Monitoring Plan: standard ASA monitors  Intra-op Monitoring Plan Comments:   Post Op Pain Control Plan: multimodal analgesia  Post Op Pain Control Plan Comments:   Induction:   IV  Beta Blocker:  Patient is not currently on a Beta-Blocker (No further documentation required).       Informed Consent: Patient understands risks and agrees with Anesthesia plan.  Questions answered. Anesthesia consent signed with patient.  ASA Score: 2     Day of Surgery Review of History & Physical: I have interviewed and examined the patient. I have reviewed the patient's H&P dated:  There are no significant changes.          Ready For Surgery From Anesthesia Perspective.

## 2019-03-27 NOTE — DISCHARGE SUMMARY
[facility]  Discharge Note  Short Stay    Admit Date: 3/27/2019    Discharge Date and Time: 03/27/2019       Attending Physician: Artemio Starks MD     Discharge Provider: Artemio Starks    Diagnoses:  Active Hospital Problems    Diagnosis  POA    *Malignant neoplasm of upper-outer quadrant of right breast in female, estrogen receptor positive [C50.411, Z17.0]  Not Applicable     Chronic      Resolved Hospital Problems   No resolved problems to display.       Discharged Condition: stable    Hospital Course: Patient was admitted for an outpatient procedure and tolerated the procedure well with no complications.    Right lumpectomy and sentinel node biopsy    Final Diagnoses: Same as principal problem.    Disposition: Home or Self Care    Follow up/Patient Instructions:    Medications:  Reconciled Home Medications:      Medication List      START taking these medications    HYDROcodone-acetaminophen 7.5-325 mg per tablet  Commonly known as:  NORCO  Take 1 tablet by mouth every 4 (four) hours as needed.        CONTINUE taking these medications    amLODIPine 10 MG tablet  Commonly known as:  NORVASC  TK 1 T PO QD     butalbital-acetaminophen-caff -40 mg -40 mg Cap     cholecalciferol (vitamin D3) 50,000 unit capsule     cyanocobalamin 1,000 mcg/mL injection     doxazosin 4 MG tablet  Commonly known as:  CARDURA     LORazepam 1 MG tablet  Commonly known as:  ATIVAN  Take 1 mg by mouth 3 (three) times daily.          Discharge Procedure Orders   Diet general     Call MD for:  temperature >100.4     Call MD for:  persistent nausea and vomiting     Call MD for:  severe uncontrolled pain     Call MD for:  difficulty breathing, headache or visual disturbances     Call MD for:  redness, tenderness, or signs of infection (pain, swelling, redness, odor or green/yellow discharge around incision site)     Remove dressing in 72 hours     Activity as tolerated         Discharge Procedure Orders (must  include Diet, Follow-up, Activity):   Discharge Procedure Orders (must include Diet, Follow-up, Activity)   Diet general     Call MD for:  temperature >100.4     Call MD for:  persistent nausea and vomiting     Call MD for:  severe uncontrolled pain     Call MD for:  difficulty breathing, headache or visual disturbances     Call MD for:  redness, tenderness, or signs of infection (pain, swelling, redness, odor or green/yellow discharge around incision site)     Remove dressing in 72 hours     Activity as tolerated

## 2019-03-27 NOTE — DISCHARGE INSTRUCTIONS
Please call for any fever, increase in pain, nausea or vomiting or redness or drainage from incision(s).      Please wear a supportive well fitting undergarments    May shower once dressings are removed  Remove steri strips as they begin to fall off    If you become constipated from the pain medication you can use over the counter laxatives,  Miralax or Glycolax, or Magnesium Citrate for severe constipation.    You will urinate green for 24 hr.    I will call you with the final pathology results.

## 2019-03-27 NOTE — PLAN OF CARE
POC discussed with pt and daughter, verbalize understanding.  Stressed importanof wearing supportive, well fitting undergarment at all times.  Pt and daughter verbalize understanding.

## 2019-03-27 NOTE — OR NURSING
Pt short of breath, breathing 35-40 per minute, CO2 30 on 2L NC, Sat 97%. Pt lethargic and arouses to voice. When pt was awaken to ask how she was feeling. She complained of right lower leg pain. Pt frequently drifts back to sleep between sentences. Pt stated she did not sleep well the night before surgery. Anesthesia was called (Dr. Mcfarlane) to bedside because of patients drowsiness and shortness of breath after recovering over an hour. Pt stated she is always short of breath at home and she sees a pulmonologist. She also continued to complain of lower right leg pain. Nurse called Dr. Starks and he ordered a bilateral U/S of both legs. Pt currently sleeping at bedside. Will continue to monitor.

## 2019-03-27 NOTE — OP NOTE
Operative Note       Surgery Date: 3/27/2019     Surgeon(s) and Role:     * Artemio Starks MD - Primary    * Jimena Watkins PA-C  First Assist          Assistance with retraction and closure of the incisions    Pre-op Diagnosis:  Malignant neoplasm of upper-outer quadrant of right breast in female, estrogen receptor positive [C50.411, Z17.0]    Post-op Diagnosis: Post-Op Diagnosis Codes:     * Malignant neoplasm of upper-outer quadrant of right breast in female, estrogen receptor positive [C50.411, Z17.0]    Procedure(s) (LRB):  LUMPECTOMY, BREAST (Right)  BIOPSY, LYMPH NODE, SENTINEL (Right)    Anesthesia: General    Procedure in Detail/Findings:   Findings:  [place] node times for all negative for carcinoma  Concern for positive margins on the superior medial inferiorly, on gross margin analysis.  Additional tissue from this aspect of the lumpectomy was excised and sent for permanent analysis    The patient underwent right breast lymphoscintigraphy.      The patient was taken to the operative room placed on the operating room table in the supine position.  General endotracheal anesthesia was induced. IV antibiotics were administered.  Pneumatic compression devices were placed on the lower extremities.    A brief time-out was performed    5 mL of Lymphazurin blue were infiltrated in the right breast.  The breast was massaged for 5 min.  The right upper arm and right chest including the breast was prepped and draped in the standard fashion.    A time-out was performed.    A curvilinear incision was made in the axilla.  Subcutaneous tissues were dissected with electrocautery.  The axillary for was identified and opened.  The gamma probe was used to identify area of radioactive lymph nodes.  A total of 4 sentinel nodes were identified.  Three of the nodes were radioactive and had green lymphatics flowing into I node was simply radioactive.  Three additional non sentinel nodes were found.    The counts of the  sentinel nodes were 1353, 9241, 1327 and 1127.  The residual counts were 18.    A transverse incision was then made at the 9 o'clock position of the right breast.  Superior and inferior skin flaps were raised.  The mass was palpable elevated and then excised circumferentially using electrocautery.      The excised tissue was tag with short suture superior and long suture laterally    At this point the frozen sections of the lymph nodes showed there was no evidence of lymph node involvement.  The wound was irrigated.  Marcaine was infiltrated.  The deep layers were closed with 2 0 Vicryl.  This deep dermis with 3 0 Vicryl in the skin with 4 0 Monocryl.    Gross analysis of the breast tissue was concern for positive margins at the medial superior inferior aspect.  As such additional margin from the inferior- medial- deep aspect.  The final deep margin was the pectoralis fascia    The borders of the lumpectomy cavity were marked with Ligaclips x 6.    The wound was irrigated.  Hemostasis was achieved. Marcaine was infiltrated.  The deep breast tissue was closed with 2 0 Vicryl interrupted fashion.  The deep dermis was closed with 3 0 Vicryl in a running fashion.  The skin was closed with 4 0 Monocryl subcuticular fashion.    Steri-Strips and dry sterile dressings were placed.    Patient tolerated procedure well.  Counts were reported to be correct    Estimated Blood Loss: 20 mL           Specimens (From admission, onward)    Start     Ordered    03/27/19 0943  Specimen to Pathology - Surgery  Once     Start Status     03/27/19 0943 Collected (03/27/19 1041) Order ID: [id number]       03/27/19 1040        Implants: * No implants in log *           Disposition: PACU - hemodynamically stable.           Condition: Stable    Attestation:  I performed the procedure.           Discharge Note    Admit Date: 3/27/2019    Attending Physician: Artemio Starks MD     Discharge Physician: Artemio Starks MD    Final Diagnosis:  Post-Op Diagnosis Codes:     * Malignant neoplasm of upper-outer quadrant of right breast in female, estrogen receptor positive [C50.411, Z17.0]    Disposition: Home or Self Care    Patient Instructions:   Current Discharge Medication List      START taking these medications    Details   HYDROcodone-acetaminophen (NORCO) 7.5-325 mg per tablet Take 1 tablet by mouth every 4 (four) hours as needed.  Qty: 30 tablet, Refills: 0         CONTINUE these medications which have NOT CHANGED    Details   amLODIPine (NORVASC) 10 MG tablet TK 1 T PO QD  Refills: 1      cholecalciferol, vitamin D3, 50,000 unit capsule Refills: 0      doxazosin (CARDURA) 4 MG tablet       lorazepam (ATIVAN) 1 MG tablet Take 1 mg by mouth 3 (three) times daily.  Refills: 0      butalbital-acetaminophen-caff -40 mg -40 mg Cap       cyanocobalamin 1,000 mcg/mL injection Refills: 0             Discharge Procedure Orders (must include Diet, Follow-up, Activity)   Discharge Procedure Orders (must include Diet, Follow-up, Activity)   Diet general     Call MD for:  temperature >100.4     Call MD for:  persistent nausea and vomiting     Call MD for:  severe uncontrolled pain     Call MD for:  difficulty breathing, headache or visual disturbances     Call MD for:  redness, tenderness, or signs of infection (pain, swelling, redness, odor or green/yellow discharge around incision site)     Remove dressing in 72 hours     Activity as tolerated        Discharge Date: No discharge date for patient encounter.

## 2019-03-28 ENCOUNTER — TELEPHONE (OUTPATIENT)
Dept: SURGERY | Facility: CLINIC | Age: 69
End: 2019-03-28

## 2019-03-28 RX ORDER — OXYCODONE AND ACETAMINOPHEN 10; 325 MG/1; MG/1
1 TABLET ORAL EVERY 4 HOURS PRN
Qty: 20 TABLET | Refills: 0 | Status: SHIPPED | OUTPATIENT
Start: 2019-03-28 | End: 2019-03-29

## 2019-03-28 NOTE — TELEPHONE ENCOUNTER
Patient called in today complaining  of pain on surgical site, she stated that she is taking 1 Narco tablet every 4 hours, the patient was advised per Dr. Starks to take 2 tablets every 4-6 hrs, and was advised to contact the office if the pain worsens, patient voiced understanding.

## 2019-03-28 NOTE — ANESTHESIA POSTPROCEDURE EVALUATION
Anesthesia Post Evaluation    Patient: Susu Luther    Procedure(s) Performed: Procedure(s) (LRB):  LUMPECTOMY, BREAST (Right)  BIOPSY, LYMPH NODE, SENTINEL (Right)    Final Anesthesia Type: general  Patient location during evaluation: PACU  Patient participation: Yes- Able to Participate  Level of consciousness: awake and alert  Post-procedure vital signs: reviewed and stable  Pain management: adequate  Airway patency: patent  PONV status at discharge: No PONV  Anesthetic complications: no      Cardiovascular status: blood pressure returned to baseline  Respiratory status: unassisted  Hydration status: euvolemic  Follow-up not needed.          Vitals Value Taken Time   /100 3/27/2019  1:09 PM   Temp 36.7 °C (98 °F) 3/27/2019  1:00 PM   Pulse 74 3/27/2019  1:09 PM   Resp 33 3/27/2019  1:09 PM   SpO2 96 % 3/27/2019  1:09 PM   Vitals shown include unvalidated device data.      Event Time     Out of Recovery 13:10:00          Pain/Evelin Score: Pain Rating Prior to Med Admin: 5 (3/27/2019 12:52 PM)  Evelin Score: 10 (3/27/2019  1:45 PM)

## 2019-03-28 NOTE — PROGRESS NOTES
Patient complained about inadequate relief with hydrocodone.  She is on postop day 1.  We will send in Percocet 10 mg as hydrocodone 7.5 mg at not relieve her pain

## 2019-03-29 ENCOUNTER — TELEPHONE (OUTPATIENT)
Dept: SURGERY | Facility: HOSPITAL | Age: 69
End: 2019-03-29

## 2019-03-29 ENCOUNTER — TELEPHONE (OUTPATIENT)
Dept: SURGERY | Facility: CLINIC | Age: 69
End: 2019-03-29

## 2019-03-29 RX ORDER — OXYCODONE AND ACETAMINOPHEN 10; 325 MG/1; MG/1
1 TABLET ORAL EVERY 8 HOURS PRN
Qty: 20 TABLET | Refills: 0 | Status: SHIPPED | OUTPATIENT
Start: 2019-03-29 | End: 2019-04-11

## 2019-03-29 NOTE — TELEPHONE ENCOUNTER
Pharmacist is aware that when medication to prescribed it pops up in our system is there is a possible interaction with medications. The provider still prescribed the medication and to go with filling the medication for the patient.

## 2019-03-29 NOTE — TELEPHONE ENCOUNTER
----- Message from Katey Meraz sent at 3/29/2019 10:36 AM CDT -----  Contact: ms april-daughter  states that directions for oxycodone weren't written on rx...[phone] (home)   .  [facility] [address] [place] [address] [place] - [address] [address] [address]  Phone: [phone] Fax: [phone]

## 2019-03-29 NOTE — TELEPHONE ENCOUNTER
----- Message from Elisabeth Ricci sent at 3/29/2019  4:46 PM CDT -----  [facility]  requesting a call back regarding possible interaction between percocet and lorazepam (ATIVAN) 1 MG tablet Please call back at [phone].      Pt uses:  [facility] [address] [facility] [address] [address] [address]  Phone: [phone] Fax: [phone]

## 2019-04-01 VITALS
OXYGEN SATURATION: 96 % | SYSTOLIC BLOOD PRESSURE: 178 MMHG | TEMPERATURE: 98 F | HEART RATE: 73 BPM | WEIGHT: 293 LBS | DIASTOLIC BLOOD PRESSURE: 104 MMHG | HEIGHT: 69 IN | BODY MASS INDEX: 43.4 KG/M2 | RESPIRATION RATE: 25 BRPM

## 2019-04-03 ENCOUNTER — TELEPHONE (OUTPATIENT)
Dept: SURGERY | Facility: CLINIC | Age: 69
End: 2019-04-03

## 2019-04-03 ENCOUNTER — TELEPHONE (OUTPATIENT)
Dept: SURGERY | Facility: HOSPITAL | Age: 69
End: 2019-04-03

## 2019-04-03 DIAGNOSIS — C50.411 MALIGNANT NEOPLASM OF UPPER-OUTER QUADRANT OF RIGHT BREAST IN FEMALE, ESTROGEN RECEPTOR POSITIVE: Primary | ICD-10-CM

## 2019-04-03 DIAGNOSIS — Z17.0 MALIGNANT NEOPLASM OF UPPER-OUTER QUADRANT OF RIGHT BREAST IN FEMALE, ESTROGEN RECEPTOR POSITIVE: Primary | ICD-10-CM

## 2019-04-03 NOTE — TELEPHONE ENCOUNTER
Spoke to patient she is aware of her oncology appointment date and time, patient voiced understanding.

## 2019-04-03 NOTE — TELEPHONE ENCOUNTER
Patient was informed of her pathology results.  A 4 cm tumor.  Negative final surgical margins.  For negative sentinel nodes in for negative non sentinel nodes.    The for Oncology consultation was discussed

## 2019-04-03 NOTE — PLAN OF CARE
OUTPATIENT OCCUPATIONAL THERAPY   EVALUATION    Name: Susu Luther  Clinic Number: [id number]    Therapy Diagnosis: R breast CA   Physician: Artemio Starks MD    Physician Orders: OT Preop  Consulttion  Medical Diagnosis: C50.411,Z17.0 (ICD-10-CM) - Malignant neoplasm of upper-outer quadrant of right breast in female, estrogen receptor positive, AK -  Evaluation Date: 3/19/2019  Authorization period Expiration: 3/13/2020  Plan of Care Certification Period: 6/11/19  Insurance: [facility]    Visit #: 1/ Visits authorized: 1  Time In:2:00 pm  Time Out: 3:00 pm  Total Billable Time: 60 minutes    Precautions: cancer    History   History of Present Illness: Susu is a 69 y.o. female that presents to  [facility] secondary to dx of R breast cancer.  Pt is scheduled for R breast lumpectomy with setinel node biopsy  Dx:  Malignant neoplasm of upper-outer quadrant of right breast in female, estrogen receptor positive, AK -  Evaluation Date: 3/19/2019    Surgery date: 3/27/19      Pt presents today  to perform baseline measurements pre-surgery to be able to detect lymphedema post surgery, UE muscle testing, postural and ROM assessment along with education of risk of lymphedema and surgical precautions post surgery. Circumferential measurements will also be taken pre-surgery of BL UEs for early detection of lymphedema post surgery. Pt will also be instructed in exercises to perform pre-surgery to insure best outcomes post surgery.     Pt presents today for baseline measurements to aid in the early detection of lymphedema, UE muscle testing, postural and ROM assessment along with education of risk of lymphedema and surgical precautions post surgery. Circumferential measurements will be taken today of BL UEs for early detection of lymphedema post surgery. Pt will also be instructed in exercises to perform pre and post-surgery to insure best outcomes.     Past Medical History:   Past  Medical History:   Diagnosis Date    Encounter for blood transfusion     Hypertension     Malignant neoplasm of upper-outer quadrant of right breast in female, estrogen receptor positive 3/14/2019       Past Surgical History:   Susu Luther  has a past surgical history that includes Oophorectomy;  section; and Appendectomy.    Medications:  Susu has a current medication list which includes the following prescription(s): amlodipine, butalbital-acetaminophen-caff -40 mg, cholecalciferol (vitamin d3), cyanocobalamin, doxazosin, and lorazepam, and the following Facility-Administered Medications: lidocaine (pf) 10 mg/ml (1%).    Allergies:  Review of patient's allergies indicates:   Allergen Reactions    Pcn [penicillins] Rash          Hand dominance: R hand dominant  Prior Therapy: R shoulder therapy several years prior  Nutrition:  Obese  Social History: Pt lives with spouse  Place of Residence (Steps/Adaptations): One story house  Current functional status:  I with ADLs but does not do meal prep, shopping, or house work  Exercise routine prior to onset : none  DME owned: straight cane  Work:  Retired                         Subjective   Pt states: she injured R shoulder after a fall 4-5 years prior and had PT in [place]. Used all of her visits but had no improvement. She has had these limitations x approx 5 years.  does most of housework and meal prep as she cannot.   Pain: 8/10 on VAS. average    Objective   Mental status :alert, oriented, attentive    Posture/Alignment   Postural examination/scapula alignment: Forward head posture/ shoulders rolled forward  Joint integrity: WFLs  Skin integrity: intact  Edema: none noted    Sensation: Light Touch: Intact; reports paresthesias in R hand, begins in shoulder, radiates into dorsal FA, and goes to sleep at night.            Proprioception: Intact  - appearance: well groomed     ROM:   UPPER EXTREMITY--AROM/PROM     Shoulder Range of  "Motion:   ACTIVE ROM LEFT RIGHT   Flexion 140 130   Abduction 130 98   Horizontal Abd NT 80   Extension 35 45   IR 45 40   ER 40 15   IR/90deg 30 NT due to pain with abd   ER/90deg 70 NT due to pain with abd     Strength: manual muscle test grades below   Upper Extremity Strength   (L) UE (R) UE   Shoulder flexion: 3+/5 3+/5   Shoulder Abduction: 3+/5 3-/5   Shoulder IR 3+/5 3+/5   Shoulder ER 3+/5 3+/5   Elbow flexion: 4/5 4/5   Elbow extension: 4/5 4/5   Lower Trap: 3+/5 NT   Middle Trap: 3+/5 NT    5/5 4+/5       Baseline Measurements of BL UE's for early detection of Lymphedema:     LANDMARK RIGHT UE LEFT UE DIFFERENCE   E + 8" 50 cm 47.7 cm 2.3 cm   E + 6" 52 cm 53.1 cm 1.1 cm   E + 4" 51 cm 51.2 cm 0.2 cm   E + 2" 44.8 cm 44.5 cm 0.3 cm   Elbow 31.2 cm 31.9 cm 0.7 cm   W+ 8" 33.0 cm 32.5 cm 0.5 cm   W +  6" 30 cm 29.0 cm 1.0 cm   W + 4" 25.6 cm 24.8 cm 0.8 cm   Wrist 19.5 cm 19.0 cm 0.5 cm   DPC 21.0 cm 21.2 cm 0.2 cm   IP Thumb 6.6 cm 6.5 cm 0.1 cm                   Coordination:   - fine motor: WFL  - UE coordination: intact     - LE coordination:  Not tested     Functional Mobility (Bed mobility, transfers)  Bed mobility: I =  independent   Roll to left: I  Roll to right: I  Supine to prone: I  Scooting to edge of bed: I  Supine to sit: I  Sit to supine: mod A  Transfers to bed: I  Transfers to toilet: I  Sit to stand:  Painful in R arm to push sit to stand  Stand pivot:  I  Car transfers:  Min A      ADL's:  Feeding: I = independent   Grooming: Difficulty combing hair, donning deoderant  Hygiene:   UB Dressing: Modified  LB Dressing: I  Toileting: Modified wiping to L hand  Bathing: Difficulty washing left arm and back and washing hair    Gait Assessment:   - AD used: none  - Assistance: independent but labored  - Distance: SOB with standing during objective measures as well as walking from waiting room to treatment room.        Endurance Deficit: none      Patient Education   - role of OT in multi - " disciplinary team, goals for OT  - Pt was educated in lymphedema etiology and management plans.    - Pt was provided with written risk reductions and precautions for managing lymphedema.   - Reviewed GEOFF drain care instructions.     ROM/lifting Precautions post surgery discussed -  until drains have been removed:  - do not lift affected arm above 90 degrees of shoulder flexion  - do not lift over 5 lbs  - do not pull or push heavy objects  - do not sleep on your stomach or surgery side     Written Home Exercises Provided and Patient Education: Handouts given   Pt was instructed in and performed therapeutic exercise for postural correction and alignment, stretching and soft tissue mobility, and strengthening.     Exercises included: handout given    - exaggerated deep breathing and relaxation  - scapular retractions  - pendulums swings  -ball squeezes  -shoulder rolls  - elbow flexion/extension    Discussed walking program goals of 30 min 5x/week but recommended starting with 2-3 min 2-3x day and increasing in 1-2 min sessions as able due to SOB.     Pt was able to demonstrate and report understanding and performance    Pt has no cultural, educational or language barriers to learning provided.    30 min total    Functional Limitations Reporting     Category: Self Care  Tool: Quick DASH Score: 72 Limitation        Modifier  Impairment Limitation Restriction    CH  0 % impaired, limited or restricted    CI  @ least 1% but less than 20% impaired, limited or restricted    CJ  @ least 20%<40% impaired, limited or restricted    CK  @ least 40%<60% impaired, limited or restricted    CL  @ least 60% <80% impaired, limited or restricted    CM  @ least 80%<100% impaired limited or restricted    CN  100% impaired, limited or restricted     Assessment   This is a 69 y.o. female referred to outpatient occupational therapy and presents with a medical diagnosis of R ER+ ND- breast cancer and was seen today pre-operatively to assess  strength and ROM of BL UEs, to take baseline circumferential measurements of BL UEs to aid in the early detection of lymphedema and provide pt education on exercises/precations post breast surgery. Pt does exhibit chronic  moderate +ROM impairments at R shoulder from previous injury and reports no progress with previous therapy.   Pt educated in lymphedema risks/precautions as well as ROM/lifting precautions post surgery - pt demonstrated/verbalized understanding. No goals established this visit as goals for OT will be established post surgery at follow up.      Anticipated barriers to occupational therapy: chronic R shoulder limitations    Pt has no cultural, educational or language barriers to learning provided.    Profile and History Assessment of Occupational Performance Level of Clinical Decision Making Complexity Score   Occupational Profile:   Susu Luther is a 69 y.o. female who lives with their spouse and is currently retired. Susu Luther has difficulty with  feeding, bathing, grooming and dressing  shopping and housework/household chores  affecting his/her daily functional abilities. His/her main goal for therapy is preop education/instructions.     Comorbidities:   difficulty sleeping, HTN and chronic R shoulder impairments    Medical and Therapy History Review:   Brief               Performance Deficits    Physical:  Joint Mobility  Muscle Power/Strength  Muscle Endurance  Gross Motor Coordination  Pain    Cognitive:  No Deficits    Psychosocial:    Habits     Clinical Decision Making:  moderate    Assessment Process:  Detailed Assessments    Modification/Need for Assistance:  Minimal-Moderate Modifications/Assistance    Intervention Selection:  Several Treatment Options       moderate  Based on PMHX, co morbidities , data from assessments and functional level of assistance required with task and clinical presentation directly impacting function.           Plan   Schedule patient for  follow up with Occupational therapy post surgery. Goals for therapy post surgery will be established at that time.     Therapist: EDDI Shaver CHT  3/19/2019

## 2019-04-03 NOTE — TELEPHONE ENCOUNTER
----- Message from Artemio Starks MD sent at 4/3/2019  2:40 PM CDT -----  Please schedule an appointment with the medical oncologist for her discuss the role of chemotherapy in breast cancer.    Patient is aware of the appointment

## 2019-04-04 ENCOUNTER — INITIAL CONSULT (OUTPATIENT)
Dept: HEMATOLOGY/ONCOLOGY | Facility: CLINIC | Age: 69
End: 2019-04-04
Payer: COMMERCIAL

## 2019-04-04 VITALS
RESPIRATION RATE: 18 BRPM | TEMPERATURE: 98 F | OXYGEN SATURATION: 99 % | HEART RATE: 99 BPM | WEIGHT: 293 LBS | DIASTOLIC BLOOD PRESSURE: 80 MMHG | BODY MASS INDEX: 43.4 KG/M2 | SYSTOLIC BLOOD PRESSURE: 130 MMHG | HEIGHT: 69 IN

## 2019-04-04 DIAGNOSIS — Z17.0 MALIGNANT NEOPLASM OF UPPER-OUTER QUADRANT OF RIGHT BREAST IN FEMALE, ESTROGEN RECEPTOR POSITIVE: Primary | Chronic | ICD-10-CM

## 2019-04-04 DIAGNOSIS — C50.411 MALIGNANT NEOPLASM OF UPPER-OUTER QUADRANT OF RIGHT BREAST IN FEMALE, ESTROGEN RECEPTOR POSITIVE: Primary | Chronic | ICD-10-CM

## 2019-04-04 PROCEDURE — 99999 PR PBB SHADOW E&M-EST. PATIENT-LVL IV: ICD-10-PCS | Mod: PBBFAC,,, | Performed by: INTERNAL MEDICINE

## 2019-04-04 PROCEDURE — 99245 OFF/OP CONSLTJ NEW/EST HI 55: CPT | Mod: S$GLB,,, | Performed by: INTERNAL MEDICINE

## 2019-04-04 PROCEDURE — 99999 PR PBB SHADOW E&M-EST. PATIENT-LVL IV: CPT | Mod: PBBFAC,,, | Performed by: INTERNAL MEDICINE

## 2019-04-04 PROCEDURE — 99245 PR OFFICE CONSULTATION,LEVEL V: ICD-10-PCS | Mod: S$GLB,,, | Performed by: INTERNAL MEDICINE

## 2019-04-04 RX ORDER — HYDROCHLOROTHIAZIDE 12.5 MG/1
12.5 CAPSULE ORAL DAILY
Refills: 8 | COMMUNITY
Start: 2019-04-03 | End: 2019-05-18

## 2019-04-04 NOTE — PROGRESS NOTES
Subjective:       Patient ID: Susu Luther is a 69 y.o. female.    Chief Complaint: Malignant neoplasm of upper-outer quadrant of right breast in female, estrogen receptor positive [C50.411, Z17.0]  HPI: We have an opportunity to see Ms. Susu Luther in Hematology Oncology clinic at [facility] on 04/04/2019.  Ms. Susu Luther is a 69 y.o. who found right breast mass on self breast exam.  Screening mammogram on 2/26/2019 showed Impression:  Right  Mass: Right breast mass at the lower outer middle position. Assessment: 0 - Incomplete. Special Views: Spot Compression View and Ultrasound are recommended.      Left  There is no mammographic evidence of malignancy.     On March 4, 2019, underwent US guided biopsy showed FINAL PATHOLOGIC DIAGNOSIS  1. BREAST, RIGHT, LOWER OUTER 08:00, ULTRASOUND-GUIDED BIOPSY:  Invasive lobular carcinoma of breast.  Size of invasive carcinoma: 19 mm in greatest linear dimension within a single core biopsy fragment.  Pickton Histologic Score: Grade 2 of 3.  Tubule formation: 3  Nuclear pleomorphism: 2  Mitoses: 1  Associated lobular carcinoma in situ (LCIS) is present.  No lymphovascular or perineural invasion.  Breast biomarkers are pending and will be included in the supplemental report.  2. AXILLA, RIGHT LYMPH NODES, ULTRASOUND-GUIDED BIOPSY:  Benign lymph node without evidence of metastatic carcinoma.  Immunohistochemical stains (Cam 5.2 and CK7) are confirmatory.    On March 27, 2019 underwent lumpectomy with sentinel node.  Pathology showed   PROCEDURE: Excision/lumpectomy  SPECIMEN LATERALITY: Right breast  TUMOR SITE: 8 o'clock  TUMOR SIZE: Approximately 4.0 x 3.0 x 3.0 cm  HISTOLOGIC TYPE: Invasive lobular carcinoma  HISTOLOGIC GRADE: Grade 2 of 3  Tubular differentiation: 3  Nuclear pleomorphism: 2  Mitotic rate: 1  TUMOR FOCALITY: Single focus of invasive carcinoma  DUCTAL CARCINOMA IN SITU: Not identified  LOBULAR CARCINOMA IN SITU:  Present  MARGINS:  Main specimen (#6) : Carcinoma present at superior, deep and medial margins  Distance from inferior margin: 5 mm  Distance from lateral margin: greater than 20 mm  Distance of anterior margin: 15 mm  Additionally submitted margins (specimen #7): Distance from newly designated margin: 4 mm  REGIONAL LYMPH NODES: Uninvolved tumor cells  Number of total lymph nodes examined: 8  Number of sentinel nodes examined: 4  TREATMENT EFFECT: No known presurgical therapy  LYMPH-VASCULAR INVASION: Not identified  ANCILLARY STUDIES (performed on patient's previous biopsy MS ):  ER: Positive (95% of tumor cells)  ME: Negative (less than 1 % of tumor cells)  Her2 by FISH: Negative (not amplified)  Ki-67%: Positive (70 % of tumor cells)  ADDITIONAL FINDINGS: Apocrine metaplasia, usual ductal hyperplasia, fibroadenomatoid change  PATHOLOGIC STAGE CLASSIFICATION: pT2 pN0       Malignant neoplasm of upper-outer quadrant of right breast in female, estrogen receptor positive    3/14/2019 Initial Diagnosis     Malignant neoplasm of upper-outer quadrant of right breast in female, estrogen receptor positive         3/27/2019 Cancer Staged     Staging form: Breast, AJCC 8th Edition  - Pathologic stage from 3/27/2019: Stage IIA (pT2, pN0(sn), cM0, G2, ER+, ME-, HER2-) - Signed by Guido Huynh MD on 4/4/2019          Past Medical History:   Diagnosis Date    Encounter for blood transfusion     Hypertension     Malignant neoplasm of upper-outer quadrant of right breast in female, estrogen receptor positive 3/14/2019     Family History   Problem Relation Age of Onset    Diabetes Maternal Grandmother     Diabetes Maternal Grandfather     Diabetes Mother     Breast cancer Neg Hx     Colon cancer Neg Hx     Ovarian cancer Neg Hx      Social History     Socioeconomic History    Marital status:      Spouse name: Not on file    Number of children: Not on file    Years of education: Not on file    Highest  education level: Not on file   Occupational History    Not on file   Social Needs    Financial resource strain: Not on file    Food insecurity:     Worry: Not on file     Inability: Not on file    Transportation needs:     Medical: Not on file     Non-medical: Not on file   Tobacco Use    Smoking status: Never Smoker    Smokeless tobacco: Never Used   Substance and Sexual Activity    Alcohol use: No    Drug use: No    Sexual activity: Yes     Partners: Male     Birth control/protection: Post-menopausal   Lifestyle    Physical activity:     Days per week: Not on file     Minutes per session: Not on file    Stress: Not on file   Relationships    Social connections:     Talks on phone: Not on file     Gets together: Not on file     Attends Gnosticist service: Not on file     Active member of club or organization: Not on file     Attends meetings of clubs or organizations: Not on file     Relationship status: Not on file   Other Topics Concern    Not on file   Social History Narrative    Not on file     Past Surgical History:   Procedure Laterality Date    APPENDECTOMY      BIOPSY, LYMPH NODE, SENTINEL Right 3/27/2019    [facility]     SECTION      x 1    LUMPECTOMY, BREAST Right 3/27/2019    [facility]    OOPHORECTOMY      right      Current Outpatient Medications   Medication Sig Dispense Refill    amLODIPine (NORVASC) 10 MG tablet TK 1 T PO QD  1    butalbital-acetaminophen-caff -40 mg -40 mg Cap       cholecalciferol, vitamin D3, 50,000 unit capsule   0    doxazosin (CARDURA) 4 MG tablet       hydroCHLOROthiazide (MICROZIDE) 12.5 mg capsule   8    oxyCODONE-acetaminophen (PERCOCET)  mg per tablet Take 1 tablet by mouth every 8 (eight) hours as needed for Pain. 20 tablet 0    cyanocobalamin 1,000 mcg/mL injection   0    lorazepam (ATIVAN) 1 MG tablet Take 1 mg by mouth 3 (three) times daily.  0      No current facility-administered medications for this visit.        Labs:  Lab Results   Component Value Date    WBC 9.93 03/14/2019    HGB 12.0 03/14/2019    HCT 39.5 03/14/2019    MCV 76 (L) 03/14/2019     03/14/2019     BMP  Lab Results   Component Value Date     03/14/2019    K 4.0 03/14/2019     03/14/2019    CO2 26 03/14/2019    BUN 20 03/14/2019    CREATININE 1.3 03/14/2019    CALCIUM 9.8 03/14/2019    ANIONGAP 9 03/14/2019    ESTGFRAFRICA 49.1 (A) 03/14/2019    EGFRNONAA 42.6 (A) 03/14/2019     Lab Results   Component Value Date    ALT 13 03/14/2019    AST 16 03/14/2019    ALKPHOS 108 03/14/2019    BILITOT 0.3 03/14/2019       No results found for: IRON, TIBC, FERRITIN, SATURATEDIRO  No results found for: OZXXXOTB09  No results found for: FOLATE  Lab Results   Component Value Date    TSH 2.098 11/28/2018       I have reviewed the radiology reports and examined the scan/xray images.    Review of Systems   Constitutional: Negative.    HENT: Negative.    Eyes: Negative.    Respiratory: Negative.    Cardiovascular: Negative.    Gastrointestinal: Negative.    Endocrine: Negative.    Genitourinary: Negative.    Musculoskeletal: Negative.    Skin: Negative.    Allergic/Immunologic: Negative.    Neurological: Negative.    Hematological: Negative.    Psychiatric/Behavioral: Negative.      ECOG SCORE    1 - Restricted in strenuous activity-ambulatory and able to carry out work of a light nature            Objective:     Vitals:    04/04/19 1504   BP: 130/80   Pulse: 99   Resp: 18   Temp: 97.8 °F (36.6 °C)   Body mass index is 47.63 kg/m².  Physical Exam   Constitutional: She is oriented to person, place, and time. She appears well-developed and well-nourished.   HENT:   Head: Normocephalic and atraumatic.   Eyes: Conjunctivae and EOM are normal.   Neck: Normal range of motion. Neck supple.   Cardiovascular: Normal rate and regular rhythm.   Pulmonary/Chest: Effort normal and breath sounds  normal.   Abdominal: Soft. Bowel sounds are normal.   Musculoskeletal: Normal range of motion.   Neurological: She is alert and oriented to person, place, and time.   Skin: Skin is warm and dry.   Psychiatric: She has a normal mood and affect. Her behavior is normal. Judgment and thought content normal.   Nursing note and vitals reviewed.        Assessment:      1. Malignant neoplasm of upper-outer quadrant of right breast in female, estrogen receptor positive           Plan:     Malignant neoplasm of upper-outer quadrant of right breast in female, estrogen receptor positive    Will send tumor tissue from March 27th for Oncotype DX.  If poor risk, will plan for adjuvant chemotherapy with dose dense AC followed by weekly taxol, then adjuvant hormone therapy with AI for 5 years.    If good risk, will plan for adjuvant hormone therapy with AI for 5 years.    Refer to see Dr. Arreguin in Radiation Oncology for consideration for adjuvant radiation, for after chemotherapy if chemotherapy is indicated. Can start radiation with hormone therapy.    -     Ambulatory referral to Radiation Oncology

## 2019-04-04 NOTE — LETTER
April 4, 2019      Artemio Starks MD  [address]  [place] [address]           [facility] Hematology Oncology  [address] [facility] [address]  Phone: [phone]  Fax: [phone]          Patient: Susu Luther   MR Number: [id number]   YOB: 1950   Date of Visit: 4/4/2019       Dear Dr. Artemio Starks:    Thank you for referring Susu Luther to me for evaluation. Attached you will find relevant portions of my assessment and plan of care.    If you have questions, please do not hesitate to call me. I look forward to following Susu Luther along with you.    Sincerely,    Guido Huynh MD    Enclosure  CC:  No Recipients    If you would like to receive this communication electronically, please contact [email or web address] or [phone] to request more information on [employer] Link access.    For providers and/or their staff who would like to refer a patient to [facility], please contact us through our one-stop-shop provider referral line, [facility] , at [phone].    If you feel you have received this communication in error or would no longer like to receive these types of communications, please e-mail [email or web address]

## 2019-04-05 NOTE — PROGRESS NOTES
[facility] [place]  RADIATION ONCOLOGY CONSULTATION    Name: Susu Luther  : 1950      Patient Referred To Radiation Oncology By:  Dr. Guido Huynh MD  [address] [place] [address]    DIAGNOSIS:  Right breast lower outer quadrant invasive lobular carcinoma pIIA: pT2 pN0(sn) cM0, ER positive, OH negative, her 2 Steff negative  1. 19 had a bilateral screening mammogram showing an irregular spiculated mass lower outer right breast middle depth.    2. 19 diagnostic mammogram confirmed the mass.  Ultrasound showed 2 enlarged right axillary lymph nodes and a lower outer quadrant right breast mass measuring 3.8 cm maximum dimension at 8:00 position.    3. 3/4/19 right breast biopsy and clip and right lymph node biopsy and clip were performed.  The axillary clip was not visualized on post biopsy imaging due to possible posterior depth of the node. The breast clip was in the expected position.  The right breast returned invasive lobular carcinoma, grade 2 with no lymphovascular or perineural invasion, ER positive 95%, OH negative, her 2 2+, FISH non-amplified.  The lymph nodes were benign.    4. 3/27/19 patient had a lumpectomy and sentinel lymph node biopsy.  Gross analysis of the lumpectomy was concerning for positive margins of the medial, superior and inferior aspect therefore additional margins were taken.  The final deep margin was the pectoralis fascia.  The lumpectomy cavity was marked with 6 Ligaclips.  Pathology contained 4 benign sentinel lymph nodes and 4 benign non sentinel lymph nodes.  The lumpectomy contained invasive lobular carcinoma measuring 4 cm maximum dimension, grade 2.  The main lumpectomy had positive margins superior, deep and medial and negative margins inferior, lateral and anterior.  The additionally submitted margins had carcinoma 4 mm from the new margin.  Final stage pT2 pN0(sn) cM0.     HISTORY OF PRESENT ILLNESS:  Susudonna Luther is  a pleasant 69 y.o. female who 2/26/19 had a bilateral screening mammogram showing an irregular spiculated mass lower outer right breast middle depth.  She tells me she felt a lump in the right breast 1 week before the mammogram.  I reviewed the images for this case personally.  2/28/19 diagnostic mammogram confirmed the mass.  Ultrasound showed 2 enlarged right axillary lymph nodes and a lower outer quadrant right breast mass measuring 3.8 cm maximum dimension at 8:00 a.m. position.  3/4/19 right breast biopsy and clip and right lymph node biopsy and clip were performed.  The axillary clip was not visualized on post biopsy imaging due to possible posterior depth of the node.  The breast clip was in the expected position.  The right breast returned invasive lobular carcinoma, grade 2 with no lymphovascular or perineural invasion, ER positive 95%, MN negative, her 2 2+, fish non-amplified.  The lymph nodes were benign.  3/27/19 patient had a lumpectomy and sentinel lymph node biopsy.  Gross analysis of the lumpectomy was concerning for positive margins of the medial, superior and inferior aspect therefore additional margins were taken.  The final deep margin was the pectoralis fascia.  The lumpectomy cavity was marked with 6 Ligaclips.  Pathology contained 4 benign sentinel lymph nodes and 4 benign non sentinel lymph nodes.  The lumpectomy contained invasive lobular carcinoma measuring 4 cm maximum dimension, grade 2.  The main lumpectomy contained positive margins superior, deep and medial and negative margins inferior, lateral and anterior.  The additionally submitted margins had carcinoma 4 mm from the new margin.  Final stage pT2 pN0(sn) cM0. She has met with Dr. Huynh and Oncotype is pending.    REVIEW OF SYSTEMS: (Positive findings bold, otherwise negative)   Constitutional: fever, fatigue, weight change  Eyes: blurred vision in the past 3 months, double vision   ENT: ear pain, new mouth lesions, jaw pain,  difficulty swallowing, sore throat  Cardiovascular: chest pain on exertion, reflux, extremity swelling  Respiratory: shortness of breath, dyspnea, cough, hemoptysis.   GI: abdominal pain, diarrhea, constipation, blood in stool, painful bowel movements  : painful or burning urination, denies blood in urine  Musculoskeletal: new bone or joint pains  Neurologic: headache, seizure, focal numbness or tingling, balance changes, speech changes  Lymph: new or enlarged lymph nodes  Psychiatric: depression, anxiety    PRIOR RADIATION HISTORY: None    PAST MEDICAL HISTORY:  Past Medical History:   Diagnosis Date    Encounter for blood transfusion     Hypertension     Malignant neoplasm of upper-outer quadrant of right breast in female, estrogen receptor positive 3/14/2019       PAST SURGICAL HISTORY:  Past Surgical History:   Procedure Laterality Date    APPENDECTOMY      BIOPSY, LYMPH NODE, SENTINEL Right 3/27/2019    [facility]     SECTION      x 1    LUMPECTOMY, BREAST Right 3/27/2019    [facility]    OOPHORECTOMY      right        ALLERGIES:   Review of patient's allergies indicates:   Allergen Reactions    Pcn [penicillins] Rash       MEDICATIONS:    Current Outpatient Medications:     amLODIPine (NORVASC) 10 MG tablet, TK 1 T PO QD, Disp: , Rfl: 1    butalbital-acetaminophen-caff -40 mg -40 mg Cap, , Disp: , Rfl:     cyanocobalamin 1,000 mcg/mL injection, , Disp: , Rfl: 0    doxazosin (CARDURA) 4 MG tablet, , Disp: , Rfl:     hydroCHLOROthiazide (MICROZIDE) 12.5 mg capsule, , Disp: , Rfl: 8    lorazepam (ATIVAN) 1 MG tablet, Take 1 mg by mouth 3 (three) times daily., Disp: , Rfl: 0    cholecalciferol, vitamin D3, 50,000 unit capsule, , Disp: , Rfl: 0    oxyCODONE-acetaminophen (PERCOCET)  mg per tablet, Take 1 tablet by mouth every 8 (eight) hours as needed for Pain., Disp: 20 tablet, Rfl: 0    SOCIAL  "HISTORY:  Social History     Socioeconomic History    Marital status:      Spouse name: Not on file    Number of children: Not on file    Years of education: Not on file    Highest education level: Not on file   Occupational History    Not on file   Social Needs    Financial resource strain: Not on file    Food insecurity:     Worry: Not on file     Inability: Not on file    Transportation needs:     Medical: Not on file     Non-medical: Not on file   Tobacco Use    Smoking status: Never Smoker    Smokeless tobacco: Never Used   Substance and Sexual Activity    Alcohol use: No    Drug use: No    Sexual activity: Yes     Partners: Male     Birth control/protection: Post-menopausal   Lifestyle    Physical activity:     Days per week: Not on file     Minutes per session: Not on file    Stress: Not on file   Relationships    Social connections:     Talks on phone: Not on file     Gets together: Not on file     Attends Moravian service: Not on file     Active member of club or organization: Not on file     Attends meetings of clubs or organizations: Not on file     Relationship status: Not on file   Other Topics Concern    Not on file   Social History Narrative    Not on file       FAMILY HISTORY:  Family History   Problem Relation Age of Onset    Diabetes Maternal Grandmother     Diabetes Maternal Grandfather     Diabetes Mother     Breast cancer Neg Hx     Colon cancer Neg Hx     Ovarian cancer Neg Hx        PHYSICAL EXAMINATION:  Constitutional: well appearing, no acute distress, ECOG 0 - Fully Active  Vitals:    BP (!) 140/100   Pulse 101   Temp 97.5 °F (36.4 °C) (Oral)   Resp 20   Ht 5' 9" (1.753 m)   Wt (!) 146.6 kg (323 lb 1.6 oz)   SpO2 97%   BMI 47.71 kg/m²   Neck: trachea midline, neck supple  Lymphatic: no cervical, supraclavicular or axillary adenopathy.  Bandage on the left axilla very small seroma.  Breast:  Left breast without masses, skin changes or retractions, " non-tender.  Right breast lower outer quadrant managed with moderate size tender seroma.  No erythema in the visible area and no drainage on the bandage.  I did not remove the bandages.  Cardiovascular: regular rate, no murmurs, no edema of the upper or lower extremities, radial pulse 2+  Respiratory: unlabored effort, clear to auscultation, no wheezes  Abdomen: soft, non-tender, no rigidity, no masses, no hepatomegaly  Spine: non-tender to percussion cervical, thoracic and lumbosacral spine    IMAGING AND LABORATORY FINDINGS: As per HPI; images reviewed personally.    ASSESSMENT:  Early stage invasive lobular carcinoma    PLAN:  We discussed the role of radiation in the setting of breast conservation.  She will meet with Dr. Huynh April 25th to discuss Oncotype results and I will see her back that day.  We discussed the sequencing of radiation, chemotherapy and endocrine therapy.    She has a moderate size lumpectomy seroma.  She will see Dr. Starks later this week.  She is recent out of surgery, so the seroma should decrease over the next several weeks but may take months to resolve.    I recommend breast only radiation without regional jena treatment.  She does not require regional jena radiation because her tumor was node negative, outer quadrant, not high-grade and without lymphovascular invasion.  The dose is a short hypofractionated course of 42.56 Gy in 16 fractions followed by lumpectomy cavity boost 10 Gy in 4 fractions.    We discussed the techniques, toxicities and indications of radiation and I answered the patient's questions to their apparent satisfaction.    JACQUE Arreguin M.D.  Radiation Oncology  [facility] [address] [place] [address]  Ph: [phone]  [email or web address]

## 2019-04-08 ENCOUNTER — INITIAL CONSULT (OUTPATIENT)
Dept: RADIATION ONCOLOGY | Facility: CLINIC | Age: 69
End: 2019-04-08
Payer: COMMERCIAL

## 2019-04-08 VITALS
OXYGEN SATURATION: 97 % | HEIGHT: 69 IN | HEART RATE: 101 BPM | WEIGHT: 293 LBS | RESPIRATION RATE: 20 BRPM | BODY MASS INDEX: 43.4 KG/M2 | DIASTOLIC BLOOD PRESSURE: 100 MMHG | SYSTOLIC BLOOD PRESSURE: 140 MMHG | TEMPERATURE: 98 F

## 2019-04-08 DIAGNOSIS — C50.411 MALIGNANT NEOPLASM OF UPPER-OUTER QUADRANT OF RIGHT BREAST IN FEMALE, ESTROGEN RECEPTOR POSITIVE: Primary | Chronic | ICD-10-CM

## 2019-04-08 DIAGNOSIS — Z17.0 MALIGNANT NEOPLASM OF UPPER-OUTER QUADRANT OF RIGHT BREAST IN FEMALE, ESTROGEN RECEPTOR POSITIVE: Primary | Chronic | ICD-10-CM

## 2019-04-08 PROCEDURE — 99999 PR PBB SHADOW E&M-EST. PATIENT-LVL III: CPT | Mod: PBBFAC,,, | Performed by: RADIOLOGY

## 2019-04-08 PROCEDURE — 99999 PR PBB SHADOW E&M-EST. PATIENT-LVL III: ICD-10-PCS | Mod: PBBFAC,,, | Performed by: RADIOLOGY

## 2019-04-08 PROCEDURE — 99245 PR OFFICE CONSULTATION,LEVEL V: ICD-10-PCS | Mod: S$GLB,,, | Performed by: RADIOLOGY

## 2019-04-08 PROCEDURE — 99245 OFF/OP CONSLTJ NEW/EST HI 55: CPT | Mod: S$GLB,,, | Performed by: RADIOLOGY

## 2019-04-08 NOTE — LETTER
April 8, 2019      Guido Huynh MD  [address]  [place] [address]           [place] - Radiation Oncology  [id number] [place] [address]  Phone: [phone]  Fax: [phone]          Patient: Susu Luther   MR Number: [id number]   YOB: 1950   Date of Visit: 4/8/2019       Dear Dr. Guido Huynh:    Thank you for referring Susu Luther to me for evaluation. Attached you will find relevant portions of my assessment and plan of care.    If you have questions, please do not hesitate to call me. I look forward to following Susu Luther along with you.    Sincerely,    Faizan Arreguin II, MD    Enclosure  CC:  No Recipients    If you would like to receive this communication electronically, please contact [email or web address] or [phone] to request more information on [employer] Link access.    For providers and/or their staff who would like to refer a patient to [facility], please contact us through our one-stop-shop provider referral line, [facility], at [phone].    If you feel you have received this communication in error or would no longer like to receive these types of communications, please e-mail [email or web address]

## 2019-04-11 ENCOUNTER — OFFICE VISIT (OUTPATIENT)
Dept: SURGERY | Facility: CLINIC | Age: 69
End: 2019-04-11
Payer: COMMERCIAL

## 2019-04-11 VITALS
DIASTOLIC BLOOD PRESSURE: 99 MMHG | SYSTOLIC BLOOD PRESSURE: 147 MMHG | WEIGHT: 293 LBS | TEMPERATURE: 98 F | BODY MASS INDEX: 47.43 KG/M2 | HEART RATE: 92 BPM

## 2019-04-11 DIAGNOSIS — Z12.39 BREAST CANCER SCREENING: Primary | ICD-10-CM

## 2019-04-11 PROCEDURE — 99024 PR POST-OP FOLLOW-UP VISIT: ICD-10-PCS | Mod: S$GLB,,, | Performed by: SURGERY

## 2019-04-11 PROCEDURE — 99999 PR PBB SHADOW E&M-EST. PATIENT-LVL III: ICD-10-PCS | Mod: PBBFAC,,, | Performed by: SURGERY

## 2019-04-11 PROCEDURE — 99024 POSTOP FOLLOW-UP VISIT: CPT | Mod: S$GLB,,, | Performed by: SURGERY

## 2019-04-11 PROCEDURE — 99999 PR PBB SHADOW E&M-EST. PATIENT-LVL III: CPT | Mod: PBBFAC,,, | Performed by: SURGERY

## 2019-04-11 RX ORDER — OXYCODONE AND ACETAMINOPHEN 7.5; 325 MG/1; MG/1
1 TABLET ORAL EVERY 6 HOURS PRN
Qty: 30 TABLET | Refills: 0 | Status: SHIPPED | OUTPATIENT
Start: 2019-04-11 | End: 2019-04-25 | Stop reason: SDUPTHER

## 2019-04-11 NOTE — PATIENT INSTRUCTIONS
It is okay to shower and get the area wet.  Please removed the paper strips when they begin to fall off.    Please wear supportive undergarments.  We will see you back in about a month

## 2019-04-11 NOTE — PROGRESS NOTES
Patient ID: Susu Luther is a 69 y.o. female.    Right breast cancer    Chief Complaint: Post-op Evaluation      HPI:  Patient follows up after a right lumpectomy and sentinel node biopsy.  She still having some right breast pain but is improving.    She has been seen by Oncology and Oncotype testing has been ordered to determine if chemotherapy will be required.  She has been seen by radiation oncology      Review of Systems   Constitutional: Negative for appetite change, chills, fatigue, fever and unexpected weight change.   HENT: Negative for hearing loss and rhinorrhea.    Eyes: Negative for visual disturbance.   Respiratory: Negative for apnea, cough, shortness of breath and wheezing.    Cardiovascular: Negative for chest pain and palpitations.   Gastrointestinal: Negative for abdominal distention, abdominal pain, blood in stool, constipation, diarrhea, nausea and vomiting.   Genitourinary: Negative for dysuria, frequency and urgency.   Musculoskeletal: Negative for arthralgias and neck pain.   Skin: Negative for rash.        Right breast soreness   Neurological: Negative for seizures, weakness, numbness and headaches.   Hematological: Negative for adenopathy. Does not bruise/bleed easily.   Psychiatric/Behavioral: Negative for hallucinations. The patient is not nervous/anxious.        Current Outpatient Medications   Medication Sig Dispense Refill    amLODIPine (NORVASC) 10 MG tablet TK 1 T PO QD  1    butalbital-acetaminophen-caff -40 mg -40 mg Cap       cholecalciferol, vitamin D3, 50,000 unit capsule   0    cyanocobalamin 1,000 mcg/mL injection   0    doxazosin (CARDURA) 4 MG tablet       hydroCHLOROthiazide (MICROZIDE) 12.5 mg capsule   8    lorazepam (ATIVAN) 1 MG tablet Take 1 mg by mouth 3 (three) times daily.  0    oxyCODONE-acetaminophen (PERCOCET) 7.5-325 mg per tablet Take 1 tablet by mouth every 6 (six) hours as needed for Pain. 30 tablet 0     No current  facility-administered medications for this visit.        Review of patient's allergies indicates:   Allergen Reactions    Pcn [penicillins] Rash       Past Medical History:   Diagnosis Date    Encounter for blood transfusion     Hypertension     Malignant neoplasm of upper-outer quadrant of right breast in female, estrogen receptor positive 3/14/2019       Past Surgical History:   Procedure Laterality Date    APPENDECTOMY      BIOPSY, LYMPH NODE, SENTINEL Right 3/27/2019    [facility] OR     SECTION      x 1    LUMPECTOMY, BREAST Right 3/27/2019    [facility] at [facility] OR    OOPHORECTOMY      right        Family History   Problem Relation Age of Onset    Diabetes Maternal Grandmother     Diabetes Maternal Grandfather     Diabetes Mother     Breast cancer Neg Hx     Colon cancer Neg Hx     Ovarian cancer Neg Hx        Social History     Socioeconomic History    Marital status:      Spouse name: Not on file    Number of children: Not on file    Years of education: Not on file    Highest education level: Not on file   Occupational History    Not on file   Social Needs    Financial resource strain: Not on file    Food insecurity:     Worry: Not on file     Inability: Not on file    Transportation needs:     Medical: Not on file     Non-medical: Not on file   Tobacco Use    Smoking status: Never Smoker    Smokeless tobacco: Never Used   Substance and Sexual Activity    Alcohol use: No    Drug use: No    Sexual activity: Yes     Partners: Male     Birth control/protection: Post-menopausal   Lifestyle    Physical activity:     Days per week: Not on file     Minutes per session: Not on file    Stress: Not on file   Relationships    Social connections:     Talks on phone: Not on file     Gets together: Not on file     Attends [employer] service: Not on file     Active member of club or organization: Not on file     Attends  meetings of clubs or organizations: Not on file     Relationship status: Not on file   Other Topics Concern    Not on file   Social History Narrative    Not on file       Vitals:    04/11/19 0951   BP: (!) 147/99   Pulse: 92   Temp: 98.3 °F (36.8 °C)       Physical Exam   Constitutional: No distress.   Obese   HENT:   Head: Normocephalic and atraumatic.   Neck: Normal range of motion. Neck supple.   Cardiovascular: Normal rate, regular rhythm and normal heart sounds.   Pulmonary/Chest: Effort normal.   Abdominal: Soft. Bowel sounds are normal.   Obese   Skin:   The right breast and right axillary incisions are healing well.  There is no erythema.  There is no significant drainage.  There is no evidence of a seroma or hematoma   Vitals reviewed.    Pathology was reviewed.  [place] nodes and non sentinel nodes were negative.  There is a 4 cm infiltrating ductal carcinoma.  Final margins are negative    Assessment & Plan:    right breast cancer.  [place] nodes negative.  Estrogen receptors were positive, progesterone receptor negative, HER2 is negative.    Await Oncotype the X testing results.    If chemotherapy is indicated a MediPort can be placed.  Placement of a MediPort can be arranged over the phone and consent obtained at the time of surgery    Follow up in 4 weeks    I did discuss MediPort placement with her    The need for MediPort placement was discussed. The risks,benefits, and potential complications were discussed.  This includes infection, bleeding, Pneumothorax, hemothorax, injury to the great vessels, loss of the Guidewire or catheter.  I also discussed embolism of air or fat, pericardial tamponade, narrowing or stenosis of the vessels and infection of the port site.    Complete list of complications were reviewed and are listed on the consent form.  Informed consent was obtained.  Surgery  was scheduled.  Preoperative instructions were given

## 2019-04-15 ENCOUNTER — TELEPHONE (OUTPATIENT)
Dept: HEMATOLOGY/ONCOLOGY | Facility: CLINIC | Age: 69
End: 2019-04-15

## 2019-04-15 NOTE — PROGRESS NOTES
[facility]  RADIATION ONCOLOGY FOLLOW UP    Name: Susu Luther : 1950     DIAGNOSIS:  Right breast lower outer quadrant invasive lobular carcinoma pIIA: pT2 pN0(sn) cM0, ER positive, WA negative, her 2 Steff negative  1. 19 had a bilateral screening mammogram showing an irregular spiculated mass lower outer right breast middle depth.    2. 19 diagnostic mammogram confirmed the mass.  Ultrasound showed 2 enlarged right axillary lymph nodes and a lower outer quadrant right breast mass measuring 3.8 cm maximum dimension at 8:00 position.    3. 3/4/19 right breast biopsy and clip and right lymph node biopsy and clip were performed.  The axillary clip was not visualized on post biopsy imaging due to possible posterior depth of the node. The breast clip was in the expected position.  The right breast returned invasive lobular carcinoma, grade 2 with no lymphovascular or perineural invasion, ER positive 95%, WA negative, her 2 2+, FISH non-amplified.  The lymph nodes were benign.    4. 3/27/19 patient had a lumpectomy and sentinel lymph node biopsy.  Gross analysis of the lumpectomy was concerning for positive margins of the medial, superior and inferior aspect therefore additional margins were taken.  The final deep margin was the pectoralis fascia.  The lumpectomy cavity was marked with 6 Ligaclips.  Pathology contained 4 benign sentinel lymph nodes and 4 benign non sentinel lymph nodes.  The lumpectomy contained invasive lobular carcinoma measuring 4 cm maximum dimension, grade 2.  The main lumpectomy had positive margins superior, deep and medial and negative margins inferior, lateral and anterior.  The additionally submitted margins had carcinoma 4 mm from the new margin.  Final stage pT2 pN0(sn) cM0.  Oncotype DX 22.    CURRENT STATUS: Susu Luther is a pleasant 69 y.o. female who presents today for follow-up.  She saw Dr. Starks in is healing well.  Her  "Oncotype DX was 22 and she was recommended Anastrozole and not chemotherapy.    PHYSICAL EXAM:   Constitutional: well appearing, no acute distress, ECOG 0 - Fully Active  Vitals:    Pulse 78   Temp 97.4 °F (36.3 °C)   Resp 18   Ht 5' 9" (1.753 m)   Wt (!) 145.2 kg (320 lb)   SpO2 99%   BMI 47.26 kg/m²     Laboratory & X-Ray Findings: Per above.      ASSESSMENT:  Early stage invasive ductal carcinoma, intermediate risk Oncotype DX recommended Anastrozole and not chemotherapy    PLAN:  We can proceed with radiation since she will not have systemic therapy.  She has intermediate risk Oncotype DX according to [facility], and non-inferior outcomes with endocrine therapy compared to chemotherapy.  I have discussed the case with Dr. Huynh and he will initiate endocrine therapy at the completion of radiation.  We again reviewed the acute and late toxicities of radiation.  Radiation planning will be performed soon and we will begin in the next 2 weeks.  For details on the dose and volume see my previous note 4/8/19.    JACQUE Arreguin M.D.  Radiation Oncology  [facility] [address] [place] [address]  Ph: [phone]  [email or web address]      "

## 2019-04-15 NOTE — TELEPHONE ENCOUNTER
SW attempted to contact pt to discuss distress screening score of a 9 and to briefly introduce SW to patient. Pt was unavailable to answer; however, a voicemail was left with SW call back info.    F/u by end of the week.    Addendum:    Pt returned SW phone call from previously. Sw introduced SW role and how we assist on her healthcare team. SW explained the importance of the distress screening score. Pt stated that she is currently in the waiting period as far as starting radiation or chemo until the pathology report comes back. Pt stated that she would like to know what stage her cancer is in. SW validated pt's thoughts and feelings. Pt stated that she did have surgery but is anxious to find out the next steps. SW explained some of the different routes taken once surgery has been performed, and also reiterated that there are different treatment routes taken for each person with the same disease. SW inquired about anxiety medication. Pt stated that she does not arrive to her appointments alone. Pt stated that she has not met with her PCP since she was diagnosed. SW suggested maybe meeting with her previous doctor for encouragement or medical advice if deemed necessary by the patient. SW inquired about spiritual support. Pt stated that she does have a big support system within her [employer] and she prays with them 2-3 times a week. Sw provided emotional support and encouragement throughout interaction.    SW and pt agreed to meet in person on Friday April 26 after her radiation appointment.

## 2019-04-25 ENCOUNTER — TELEPHONE (OUTPATIENT)
Dept: PHARMACY | Facility: CLINIC | Age: 69
End: 2019-04-25

## 2019-04-25 ENCOUNTER — OFFICE VISIT (OUTPATIENT)
Dept: HEMATOLOGY/ONCOLOGY | Facility: CLINIC | Age: 69
End: 2019-04-25
Payer: COMMERCIAL

## 2019-04-25 VITALS
HEIGHT: 69 IN | RESPIRATION RATE: 18 BRPM | BODY MASS INDEX: 43.4 KG/M2 | HEART RATE: 77 BPM | SYSTOLIC BLOOD PRESSURE: 186 MMHG | TEMPERATURE: 98 F | DIASTOLIC BLOOD PRESSURE: 109 MMHG | OXYGEN SATURATION: 99 % | WEIGHT: 293 LBS

## 2019-04-25 DIAGNOSIS — C50.411 MALIGNANT NEOPLASM OF UPPER-OUTER QUADRANT OF RIGHT BREAST IN FEMALE, ESTROGEN RECEPTOR POSITIVE: Primary | Chronic | ICD-10-CM

## 2019-04-25 DIAGNOSIS — Z17.0 MALIGNANT NEOPLASM OF UPPER-OUTER QUADRANT OF RIGHT BREAST IN FEMALE, ESTROGEN RECEPTOR POSITIVE: Primary | Chronic | ICD-10-CM

## 2019-04-25 DIAGNOSIS — I10 ESSENTIAL HYPERTENSION: Chronic | ICD-10-CM

## 2019-04-25 PROCEDURE — 3080F PR MOST RECENT DIASTOLIC BLOOD PRESSURE >= 90 MM HG: ICD-10-PCS | Mod: CPTII,S$GLB,, | Performed by: INTERNAL MEDICINE

## 2019-04-25 PROCEDURE — 99999 PR PBB SHADOW E&M-EST. PATIENT-LVL IV: CPT | Mod: PBBFAC,,, | Performed by: INTERNAL MEDICINE

## 2019-04-25 PROCEDURE — 99215 PR OFFICE/OUTPT VISIT, EST, LEVL V, 40-54 MIN: ICD-10-PCS | Mod: S$GLB,,, | Performed by: INTERNAL MEDICINE

## 2019-04-25 PROCEDURE — 3077F PR MOST RECENT SYSTOLIC BLOOD PRESSURE >= 140 MM HG: ICD-10-PCS | Mod: CPTII,S$GLB,, | Performed by: INTERNAL MEDICINE

## 2019-04-25 PROCEDURE — 3077F SYST BP >= 140 MM HG: CPT | Mod: CPTII,S$GLB,, | Performed by: INTERNAL MEDICINE

## 2019-04-25 PROCEDURE — 99999 PR PBB SHADOW E&M-EST. PATIENT-LVL IV: ICD-10-PCS | Mod: PBBFAC,,, | Performed by: INTERNAL MEDICINE

## 2019-04-25 PROCEDURE — 3080F DIAST BP >= 90 MM HG: CPT | Mod: CPTII,S$GLB,, | Performed by: INTERNAL MEDICINE

## 2019-04-25 PROCEDURE — 1100F PTFALLS ASSESS-DOCD GE2>/YR: CPT | Mod: CPTII,S$GLB,, | Performed by: INTERNAL MEDICINE

## 2019-04-25 PROCEDURE — 3288F PR FALLS RISK ASSESSMENT DOCUMENTED: ICD-10-PCS | Mod: CPTII,S$GLB,, | Performed by: INTERNAL MEDICINE

## 2019-04-25 PROCEDURE — 3288F FALL RISK ASSESSMENT DOCD: CPT | Mod: CPTII,S$GLB,, | Performed by: INTERNAL MEDICINE

## 2019-04-25 PROCEDURE — 1100F PR PT FALLS ASSESS DOC 2+ FALLS/FALL W/INJURY/YR: ICD-10-PCS | Mod: CPTII,S$GLB,, | Performed by: INTERNAL MEDICINE

## 2019-04-25 PROCEDURE — 99215 OFFICE O/P EST HI 40 MIN: CPT | Mod: S$GLB,,, | Performed by: INTERNAL MEDICINE

## 2019-04-25 RX ORDER — ANASTROZOLE 1 MG/1
1 TABLET ORAL DAILY
Qty: 30 TABLET | Refills: 11 | Status: SHIPPED | OUTPATIENT
Start: 2019-04-25 | End: 2019-08-26 | Stop reason: ALTCHOICE

## 2019-04-25 RX ORDER — OXYCODONE AND ACETAMINOPHEN 7.5; 325 MG/1; MG/1
1 TABLET ORAL EVERY 6 HOURS PRN
Qty: 30 TABLET | Refills: 0 | Status: SHIPPED | OUTPATIENT
Start: 2019-04-25 | End: 2019-06-10

## 2019-04-25 RX ORDER — QUETIAPINE FUMARATE 100 MG/1
TABLET, FILM COATED ORAL
Refills: 2 | COMMUNITY
Start: 2019-04-03 | End: 2019-04-30

## 2019-04-25 NOTE — PROGRESS NOTES
Subjective:       Patient ID: Susu Luther is a 69 y.o. female.    Chief Complaint: Malignant neoplasm of upper-outer quadrant of right breast in female, estrogen receptor positive [C50.411, Z17.0]  HPI: We have an opportunity to see Ms. Susu Luther in Hematology Oncology clinic at [facility] on 04/25/2019.  Ms. Susu Luther is a 69 y.o. woman with pT2N0 invasive lobular breast cancer ER positive UT negative Her2 negative s/p lumpectomy and sentinel node biopsy.      Was found right breast mass on self breast exam.  Screening mammogram on 2/26/2019 showed Impression:  Right  Mass: Right breast mass at the lower outer middle position. Assessment: 0 - Incomplete. Special Views: Spot Compression View and Ultrasound are recommended.      Left  There is no mammographic evidence of malignancy.     On March 4, 2019, underwent US guided biopsy showed FINAL PATHOLOGIC DIAGNOSIS  1. BREAST, RIGHT, LOWER OUTER 08:00, ULTRASOUND-GUIDED BIOPSY:  Invasive lobular carcinoma of breast.  Size of invasive carcinoma: 19 mm in greatest linear dimension within a single core biopsy fragment.  Horace Histologic Score: Grade 2 of 3.  Tubule formation: 3  Nuclear pleomorphism: 2  Mitoses: 1  Associated lobular carcinoma in situ (LCIS) is present.  No lymphovascular or perineural invasion.  Breast biomarkers are pending and will be included in the supplemental report.  2. AXILLA, RIGHT LYMPH NODES, ULTRASOUND-GUIDED BIOPSY:  Benign lymph node without evidence of metastatic carcinoma.  Immunohistochemical stains (Cam 5.2 and CK7) are confirmatory.     On March 27, 2019 underwent lumpectomy with sentinel node.  Pathology showed   PROCEDURE: Excision/lumpectomy  SPECIMEN LATERALITY: Right breast  TUMOR SITE: 8 o'clock  TUMOR SIZE: Approximately 4.0 x 3.0 x 3.0 cm  HISTOLOGIC TYPE: Invasive lobular carcinoma  HISTOLOGIC GRADE: Grade 2 of 3  Tubular differentiation: 3  Nuclear pleomorphism: 2  Mitotic rate:  1  TUMOR FOCALITY: Single focus of invasive carcinoma  DUCTAL CARCINOMA IN SITU: Not identified  LOBULAR CARCINOMA IN SITU: Present  MARGINS:  Main specimen (#6) : Carcinoma present at superior, deep and medial margins  Distance from inferior margin: 5 mm  Distance from lateral margin: greater than 20 mm  Distance of anterior margin: 15 mm  Additionally submitted margins (specimen #7): Distance from newly designated margin: 4 mm  REGIONAL LYMPH NODES: Uninvolved tumor cells  Number of total lymph nodes examined: 8  Number of sentinel nodes examined: 4  TREATMENT EFFECT: No known presurgical therapy  LYMPH-VASCULAR INVASION: Not identified  ANCILLARY STUDIES (performed on patient's previous biopsy MS ):  ER: Positive (95% of tumor cells)  MN: Negative (less than 1 % of tumor cells)  Her2 by FISH: Negative (not amplified)  Ki-67%: Positive (70 % of tumor cells)  ADDITIONAL FINDINGS: Apocrine metaplasia, usual ductal hyperplasia, fibroadenomatoid change  PATHOLOGIC STAGE CLASSIFICATION: pT2 pN0          Malignant neoplasm of upper-outer quadrant of right breast in female, estrogen receptor positive    3/14/2019 Initial Diagnosis     Malignant neoplasm of upper-outer quadrant of right breast in female, estrogen receptor positive         3/27/2019 Cancer Staged     Staging form: Breast, AJCC 8th Edition  - Pathologic stage from 3/27/2019: Stage IIA (pT2, pN0(sn), cM0, G2, ER+, MN-, HER2-) - Signed by Guido Huynh MD on 4/4/2019          Past Medical History:   Diagnosis Date    Encounter for blood transfusion     Hypertension     Malignant neoplasm of upper-outer quadrant of right breast in female, estrogen receptor positive 3/14/2019     Family History   Problem Relation Age of Onset    Diabetes Maternal Grandmother     Diabetes Maternal Grandfather     Diabetes Mother     Breast cancer Neg Hx     Colon cancer Neg Hx     Ovarian cancer Neg Hx      Social History     Socioeconomic History    Marital  status:      Spouse name: Not on file    Number of children: Not on file    Years of education: Not on file    Highest education level: Not on file   Occupational History    Not on file   Social Needs    Financial resource strain: Not on file    Food insecurity:     Worry: Not on file     Inability: Not on file    Transportation needs:     Medical: Not on file     Non-medical: Not on file   Tobacco Use    Smoking status: Never Smoker    Smokeless tobacco: Never Used   Substance and Sexual Activity    Alcohol use: No    Drug use: No    Sexual activity: Yes     Partners: Male     Birth control/protection: Post-menopausal   Lifestyle    Physical activity:     Days per week: Not on file     Minutes per session: Not on file    Stress: Not on file   Relationships    Social connections:     Talks on phone: Not on file     Gets together: Not on file     Attends [employer] service: Not on file     Active member of club or organization: Not on file     Attends meetings of clubs or organizations: Not on file     Relationship status: Not on file   Other Topics Concern    Not on file   Social History Narrative    Not on file     Past Surgical History:   Procedure Laterality Date    APPENDECTOMY      BIOPSY, LYMPH NODE, SENTINEL Right 3/27/2019    [facility]     SECTION      x 1    LUMPECTOMY, BREAST Right 3/27/2019    [facility] at [facility] OR    OOPHORECTOMY      right      Current Outpatient Medications   Medication Sig Dispense Refill    amLODIPine (NORVASC) 10 MG tablet TK 1 T PO QD  1    butalbital-acetaminophen-caff -40 mg -40 mg Cap       cholecalciferol, vitamin D3, 50,000 unit capsule   0    cyanocobalamin 1,000 mcg/mL injection   0    doxazosin (CARDURA) 4 MG tablet       hydroCHLOROthiazide (MICROZIDE) 12.5 mg capsule   8    lorazepam (ATIVAN) 1 MG tablet Take 1 mg by mouth 3 (three) times daily.  0     oxyCODONE-acetaminophen (PERCOCET) 7.5-325 mg per tablet Take 1 tablet by mouth every 6 (six) hours as needed for Pain. 30 tablet 0     No current facility-administered medications for this visit.        Labs:  Lab Results   Component Value Date    WBC 9.93 03/14/2019    HGB 12.0 03/14/2019    HCT 39.5 03/14/2019    MCV 76 (L) 03/14/2019     03/14/2019     BMP  Lab Results   Component Value Date     03/14/2019    K 4.0 03/14/2019     03/14/2019    CO2 26 03/14/2019    BUN 20 03/14/2019    CREATININE 1.3 03/14/2019    CALCIUM 9.8 03/14/2019    ANIONGAP 9 03/14/2019    ESTGFRAFRICA 49.1 (A) 03/14/2019    EGFRNONAA 42.6 (A) 03/14/2019     Lab Results   Component Value Date    ALT 13 03/14/2019    AST 16 03/14/2019    ALKPHOS 108 03/14/2019    BILITOT 0.3 03/14/2019       No results found for: IRON, TIBC, FERRITIN, SATURATEDIRO  No results found for: [id number]  No results found for: FOLATE  Lab Results   Component Value Date    TSH 2.098 11/28/2018       I have reviewed the radiology reports and examined the scan/xray images.    Review of Systems   Constitutional: Negative.    HENT: Negative.    Eyes: Negative.    Respiratory: Negative.    Cardiovascular: Negative.    Gastrointestinal: Negative.    Endocrine: Negative.    Genitourinary: Negative.    Musculoskeletal: Negative.    Skin: Negative.    Allergic/Immunologic: Negative.    Neurological: Negative.    Hematological: Negative.    Psychiatric/Behavioral: Negative.      ECOG SCORE              Objective:   There were no vitals filed for this visit.There is no height or weight on file to calculate BMI.  Physical Exam   Constitutional: She is oriented to person, place, and time. She appears well-developed and well-nourished.   HENT:   Head: Normocephalic and atraumatic.   Eyes: Conjunctivae and EOM are normal.   Neck: Normal range of motion. Neck supple.   Cardiovascular: Normal rate and regular rhythm.   Pulmonary/Chest: Effort normal and breath  sounds normal.   Abdominal: Soft. Bowel sounds are normal.   Musculoskeletal: Normal range of motion.   Neurological: She is alert and oriented to person, place, and time.   Skin: Skin is warm and dry.   Psychiatric: She has a normal mood and affect. Her behavior is normal. Judgment and thought content normal.   Nursing note and vitals reviewed.        Assessment:      1. Malignant neoplasm of upper-outer quadrant of right breast in female, estrogen receptor positive    2. Essential hypertension           Plan:     Malignant neoplasm of upper-outer quadrant of right breast in female, estrogen receptor positive  -     anastrozole (ARIMIDEX) 1 mg Tab; Take 1 tablet (1 mg total) by mouth once daily.  Dispense: 30 tablet; Refill: 11  -     Ambulatory referral to Internal Medicine  -     oxyCODONE-acetaminophen (PERCOCET) 7.5-325 mg per tablet; Take 1 tablet by mouth every 6 (six) hours as needed for Pain.  Dispense: 30 tablet; Refill: 0      Oncotype type DX recurrence score 22, with distant recurrence risk 8%, absolute chemotherapy benefit <1%.    Will plan for adjuvant anastrozole 1 mg daily for 5 years.  Will need to monitor for osteopenia.  Will set up for DEXA scan, also start vitamin D3 2000 iu per day.    Mrs. Luther will get adjuvant radiation with Dr. Arreguin. Will discuss with Dr. Arreguin regarding sequencing of radiation and AI.        Essential hypertension    Appointment with [facility] to establish care.    Distress Screening Results: Psychosocial Distress screening score of Distress Score: 7 noted and reviewed. No intervention indicated.

## 2019-04-25 NOTE — PROGRESS NOTES
Distress Screening Results: Psychosocial Distress screening score of Distress Score: 7 No intervention is needed.

## 2019-04-25 NOTE — TELEPHONE ENCOUNTER
Informed patient that [facility] received prescription for anastrozole and benefits investigation is required.  [facility] will be back in touch once insurance determination is received.

## 2019-04-26 ENCOUNTER — SOCIAL WORK (OUTPATIENT)
Dept: HEMATOLOGY/ONCOLOGY | Facility: CLINIC | Age: 69
End: 2019-04-26

## 2019-04-26 ENCOUNTER — OFFICE VISIT (OUTPATIENT)
Dept: RADIATION ONCOLOGY | Facility: CLINIC | Age: 69
End: 2019-04-26
Payer: COMMERCIAL

## 2019-04-26 ENCOUNTER — TELEPHONE (OUTPATIENT)
Dept: HEMATOLOGY/ONCOLOGY | Facility: CLINIC | Age: 69
End: 2019-04-26

## 2019-04-26 VITALS
HEIGHT: 69 IN | HEART RATE: 78 BPM | OXYGEN SATURATION: 99 % | TEMPERATURE: 97 F | BODY MASS INDEX: 43.4 KG/M2 | RESPIRATION RATE: 18 BRPM | WEIGHT: 293 LBS

## 2019-04-26 DIAGNOSIS — C50.411 MALIGNANT NEOPLASM OF UPPER-OUTER QUADRANT OF RIGHT BREAST IN FEMALE, ESTROGEN RECEPTOR POSITIVE: Primary | Chronic | ICD-10-CM

## 2019-04-26 DIAGNOSIS — Z17.0 MALIGNANT NEOPLASM OF UPPER-OUTER QUADRANT OF RIGHT BREAST IN FEMALE, ESTROGEN RECEPTOR POSITIVE: Primary | Chronic | ICD-10-CM

## 2019-04-26 PROCEDURE — 99213 OFFICE O/P EST LOW 20 MIN: CPT | Mod: S$GLB,,, | Performed by: RADIOLOGY

## 2019-04-26 PROCEDURE — 99999 PR PBB SHADOW E&M-EST. PATIENT-LVL III: CPT | Mod: PBBFAC,,, | Performed by: RADIOLOGY

## 2019-04-26 PROCEDURE — 99999 PR PBB SHADOW E&M-EST. PATIENT-LVL III: ICD-10-PCS | Mod: PBBFAC,,, | Performed by: RADIOLOGY

## 2019-04-26 PROCEDURE — 99213 PR OFFICE/OUTPT VISIT, EST, LEVL III, 20-29 MIN: ICD-10-PCS | Mod: S$GLB,,, | Performed by: RADIOLOGY

## 2019-04-26 PROCEDURE — 1101F PT FALLS ASSESS-DOCD LE1/YR: CPT | Mod: CPTII,S$GLB,, | Performed by: RADIOLOGY

## 2019-04-26 PROCEDURE — 1101F PR PT FALLS ASSESS DOC 0-1 FALLS W/OUT INJ PAST YR: ICD-10-PCS | Mod: CPTII,S$GLB,, | Performed by: RADIOLOGY

## 2019-04-26 RX ORDER — DIAZEPAM 10 MG/1
10 TABLET ORAL 2 TIMES DAILY
Refills: 0 | COMMUNITY
Start: 2019-04-24 | End: 2019-05-14 | Stop reason: SDUPTHER

## 2019-04-26 NOTE — TELEPHONE ENCOUNTER
----- Message from Elisabeth Ricci sent at 4/26/2019  2:24 PM CDT -----  Contact: Patient   Type:  Patient Returning Call    Who Called:Pateint   Who Left Message for Patient:Dr. HUYNH NURSE   Does the patient know what this is regarding?:MEDICATION  Would the patient rather a call back or a response via [facility]? CALL  Best Call Back Number:[phone]  Additional Information: pharmacy stated to start medication on 5/6 but Dr. Huynh stated to take medication until after radiation

## 2019-04-26 NOTE — TELEPHONE ENCOUNTER
Initial anastrozole consult completed on  . Anastrozole (Arimidex) will be shipped on  to arrive at patient's home on  via [employer]. $ 7.16 copay. Patient plans to start anastrozole on . Address confirmed, CC on file. Confirmed 2 patient identifiers - name and . Therapy Appropriate.     Patient was counseled on the administration directions for:  -Take 1 tablet (1mg) by mouth once daily after a meal.    -Do not chew, crush, or break the tablets.   If possible, patient was instructed tip the tablets from the RX bottle to the cap, and take directly from the cap to the mouth.  Patient may handle the medication with their hands if they wear with a latex or nitrile glove and wash their hands before and after handling the tablets.    Patient was counseled on the following possible side effects, which include, but are not limited to:  Hot flashes, peripheral edema, nausea, vomiting, diarrhea, joint/muscle pains.  Patient advised that medication can contribute to osteoporosis and hypercholesteremia.    DDIs:  Medication list reviewed     Patient was given 2 patient education handouts on how to handle oral chemotherapy and specific recommendations- do's and don'ts. Instructed the patient that if they have any remaining oral chemotherapy, not to flush down the toilet or throw away in the trash; The patient or caregiver should return the unused oral chemotherapy to either the clinic or to myself in the Pharmacy where the oral chemotherapy can be disposed of properly.     Patient confirmed understanding. Patient did not have additional questions.   Consultation included: indication; goals of treatment; administration; storage and handling; side effects; how to handle side effects; the importance of compliance; how to handle missed doses; the importance of laboratory monitoring; the importance of keeping all follow up appointments.  Patient understands to report any medication changes to [facility] and provider. All  questions answered and addressed to patients satisfaction. I will f/u with patient in 1 week from start, [facility] to contact patient in 3 weeks for refills.

## 2019-04-26 NOTE — TELEPHONE ENCOUNTER
----- Message from Guido Huynh MD sent at 4/26/2019  1:49 PM CDT -----  Regarding: RE: Anastrozole  Milad Otto,    Would you please let Mrs. Luther know that she is not to start until after completion of radiation.    Thank you,    ----- Message -----  From: Lupe Bass LPN  Sent: 4/26/2019   1:21 PM  To: Guido Huynh MD  Subject: FW: Anastrozole                                      ----- Message -----  From: Mariely Saez, PharmD  Sent: 4/26/2019  12:31 PM  To: Shelbie Zuñiga, PharmD, Antoinette Herbert, PharmD, #  Subject: Anastrozole                                      Good morning,    We have reached out to Ms. Luther for her initial consultation for anastrozole. Medication will be  for her to receive on 5/1. Patient states she will start therapy on 5/6.     Thank you,    Mariely Saez, PharmD  [facility]   [phone]

## 2019-04-29 NOTE — PROGRESS NOTES
SW met with pt to f/u from previous phone encounter. Pt stated that she was not doing well because of her . Pt stated that she does have issues concerning her spouse. Pt stated that her two daughters, who live in [place], suggested that the pt move in with them while she is undergoing treatment; however,s he would have to switch to [facility] instead. Pt stated that she is hesitant about the switch because of her . SW provided emotional support. SW left to find a sheet of paper and noticed a man looking for his counterpart. Pt stated that man was her , Campos Luther. SW asked if she wanted her  to be in the meeting. Campos verbalized some of his concerns. Pt stated that she is happy her  is verbalizing his frustrations.  Pt became emotional and had taken heavy breaths. SW asked pt's spouse, Campos,  Pt stated that her  is blaming her for her own health problems. Campos is also a [place] vet who suffers from PTSD and other conditions as well. SW suggested marriage counseling to help with the communication and empathy barrier that is missing within the marriage from my observation. Pt stated that she and her  see a  and has a good relationship with them. Throughout entire interaction, SW was the . Pt and  stated that they were going on a weekend getaway to celebrate their 50th anniversary which was April 1.     SW will f/u with pt during her next appt and if not then a phone call will be placed.

## 2019-04-30 ENCOUNTER — PATIENT OUTREACH (OUTPATIENT)
Dept: ADMINISTRATIVE | Facility: HOSPITAL | Age: 69
End: 2019-04-30

## 2019-04-30 ENCOUNTER — TELEPHONE (OUTPATIENT)
Dept: INTERNAL MEDICINE | Facility: CLINIC | Age: 69
End: 2019-04-30

## 2019-04-30 ENCOUNTER — OFFICE VISIT (OUTPATIENT)
Dept: INTERNAL MEDICINE | Facility: CLINIC | Age: 69
End: 2019-04-30
Payer: COMMERCIAL

## 2019-04-30 VITALS
OXYGEN SATURATION: 98 % | DIASTOLIC BLOOD PRESSURE: 80 MMHG | HEART RATE: 95 BPM | SYSTOLIC BLOOD PRESSURE: 150 MMHG | TEMPERATURE: 97 F | BODY MASS INDEX: 48.18 KG/M2 | WEIGHT: 293 LBS

## 2019-04-30 DIAGNOSIS — R06.89 DYSPNEA AND RESPIRATORY ABNORMALITIES: ICD-10-CM

## 2019-04-30 DIAGNOSIS — I10 ESSENTIAL HYPERTENSION: Primary | Chronic | ICD-10-CM

## 2019-04-30 DIAGNOSIS — R06.00 DYSPNEA AND RESPIRATORY ABNORMALITIES: ICD-10-CM

## 2019-04-30 DIAGNOSIS — G47.33 OSA (OBSTRUCTIVE SLEEP APNEA): Chronic | ICD-10-CM

## 2019-04-30 PROCEDURE — 99999 PR PBB SHADOW E&M-EST. PATIENT-LVL III: ICD-10-PCS | Mod: PBBFAC,,, | Performed by: FAMILY MEDICINE

## 2019-04-30 PROCEDURE — 3079F DIAST BP 80-89 MM HG: CPT | Mod: CPTII,S$GLB,, | Performed by: FAMILY MEDICINE

## 2019-04-30 PROCEDURE — 1101F PR PT FALLS ASSESS DOC 0-1 FALLS W/OUT INJ PAST YR: ICD-10-PCS | Mod: CPTII,S$GLB,, | Performed by: FAMILY MEDICINE

## 2019-04-30 PROCEDURE — 1101F PT FALLS ASSESS-DOCD LE1/YR: CPT | Mod: CPTII,S$GLB,, | Performed by: FAMILY MEDICINE

## 2019-04-30 PROCEDURE — 3077F PR MOST RECENT SYSTOLIC BLOOD PRESSURE >= 140 MM HG: ICD-10-PCS | Mod: CPTII,S$GLB,, | Performed by: FAMILY MEDICINE

## 2019-04-30 PROCEDURE — 3079F PR MOST RECENT DIASTOLIC BLOOD PRESSURE 80-89 MM HG: ICD-10-PCS | Mod: CPTII,S$GLB,, | Performed by: FAMILY MEDICINE

## 2019-04-30 PROCEDURE — 99203 OFFICE O/P NEW LOW 30 MIN: CPT | Mod: S$GLB,,, | Performed by: FAMILY MEDICINE

## 2019-04-30 PROCEDURE — 99203 PR OFFICE/OUTPT VISIT, NEW, LEVL III, 30-44 MIN: ICD-10-PCS | Mod: S$GLB,,, | Performed by: FAMILY MEDICINE

## 2019-04-30 PROCEDURE — 3077F SYST BP >= 140 MM HG: CPT | Mod: CPTII,S$GLB,, | Performed by: FAMILY MEDICINE

## 2019-04-30 PROCEDURE — 99999 PR PBB SHADOW E&M-EST. PATIENT-LVL III: CPT | Mod: PBBFAC,,, | Performed by: FAMILY MEDICINE

## 2019-04-30 RX ORDER — BUTALBITAL, ACETAMINOPHEN AND CAFFEINE 50; 325; 40 MG/1; MG/1; MG/1
1 CAPSULE ORAL 3 TIMES DAILY
Qty: 30 CAPSULE | Refills: 0 | Status: SHIPPED | OUTPATIENT
Start: 2019-04-30 | End: 2019-05-20

## 2019-04-30 RX ORDER — METHYLPREDNISOLONE 4 MG/1
TABLET ORAL
Refills: 0 | COMMUNITY
Start: 2019-04-24 | End: 2019-05-14 | Stop reason: ALTCHOICE

## 2019-04-30 RX ORDER — BLOOD-GLUCOSE METER
KIT MISCELLANEOUS
Qty: 1 EACH | Refills: 0 | Status: SHIPPED | OUTPATIENT
Start: 2019-04-30 | End: 2022-02-15 | Stop reason: ALTCHOICE

## 2019-04-30 RX ORDER — LOSARTAN POTASSIUM 50 MG/1
50 TABLET ORAL DAILY
Qty: 90 TABLET | Refills: 3 | Status: SHIPPED | OUTPATIENT
Start: 2019-04-30 | End: 2019-05-14

## 2019-04-30 RX ORDER — LOSARTAN POTASSIUM 50 MG/1
50 TABLET ORAL DAILY
Qty: 90 TABLET | Refills: 3 | Status: SHIPPED | OUTPATIENT
Start: 2019-04-30 | End: 2019-04-30 | Stop reason: SDUPTHER

## 2019-04-30 NOTE — PROGRESS NOTES
Subjective:       Patient ID: Susu Luther is a 69 y.o. female.    Chief Complaint: Establish Care    F/U:        Pt is a a 69 year old who has fluctuating blood pressure. Pt is on norvasc and microzide. Pt is seeing Pulmonary and was working is being worked up for sleep apnea.     Review of Systems   Constitutional: Negative.    Respiratory: Negative.    Cardiovascular: Negative.    Genitourinary: Negative.    Musculoskeletal: Negative.    Neurological: Negative.        Objective:      Physical Exam   Constitutional: She appears well-developed and well-nourished.   Cardiovascular: Normal rate and regular rhythm. Exam reveals no friction rub.   No murmur heard.  Pulmonary/Chest: Effort normal and breath sounds normal.   Musculoskeletal: Normal range of motion.       Assessment:       1. Essential hypertension    2. NILS (obstructive sleep apnea)    3. Dyspnea and respiratory abnormalities        Plan:       Essential hypertension  Comments:  Will do losartan 50 mg a day and continue on amlodipine.     NILS (obstructive sleep apnea)  Comments:  I really think this need to be further worked up. Will send pt back up to pulmonay.     Dyspnea and respiratory abnormalities  Comments:  Pt needs to follow-up with pulmonary    Other orders  -     Discontinue: losartan (COZAAR) 50 MG tablet; Take 1 tablet (50 mg total) by mouth once daily.  Dispense: 90 tablet; Refill: 3  -     blood pressure kit med and lrg Kit; Take blood pressure first thing in the morning.  Dispense: 1 each; Refill: 0  -     butalbital-acetaminophen-caff -40 mg -40 mg Cap; Take 1 tablet by mouth 3 (three) times daily.  Dispense: 30 capsule; Refill: 0  -     losartan (COZAAR) 50 MG tablet; Take 1 tablet (50 mg total) by mouth once daily.  Dispense: 90 tablet; Refill: 3

## 2019-04-30 NOTE — LETTER
April 30, 2019      Guido Huynh MD  [address]  [place] [address]           [facility]  [address]  [place] [address]  Phone: [phone]  Fax: [phone]          Patient: Susu Luther   MR Number: [id number]   YOB: 1950   Date of Visit: 4/30/2019       Dear Dr. Guido Huynh:    Thank you for referring Susu Luther to me for evaluation. Attached you will find relevant portions of my assessment and plan of care.    If you have questions, please do not hesitate to call me. I look forward to following Susu Luther along with you.    Sincerely,    Kennedy Rojas MD    Enclosure  CC:  No Recipients    If you would like to receive this communication electronically, please contact [email or web address] or [phone] to request more information on "[employer] Link access.    For providers and/or their staff who would like to refer a patient to [facility], please contact us through our one-stop-shop provider referral line, [facility], at [phone].    If you feel you have received this communication in error or would no longer like to receive these types of communications, please e-mail [email or web address]

## 2019-05-01 ENCOUNTER — HOSPITAL ENCOUNTER (OUTPATIENT)
Dept: RADIATION THERAPY | Facility: HOSPITAL | Age: 69
Discharge: HOME | End: 2019-05-01
Attending: RADIOLOGY
Payer: COMMERCIAL

## 2019-05-06 ENCOUNTER — HOSPITAL ENCOUNTER (EMERGENCY)
Facility: HOSPITAL | Age: 69
Discharge: HOME | End: 2019-05-06
Attending: EMERGENCY MEDICINE
Payer: COMMERCIAL

## 2019-05-06 ENCOUNTER — TELEPHONE (OUTPATIENT)
Dept: INTERNAL MEDICINE | Facility: CLINIC | Age: 69
End: 2019-05-06

## 2019-05-06 ENCOUNTER — TELEPHONE (OUTPATIENT)
Dept: RADIATION ONCOLOGY | Facility: CLINIC | Age: 69
End: 2019-05-06

## 2019-05-06 ENCOUNTER — DOCUMENTATION ONLY (OUTPATIENT)
Dept: RADIATION ONCOLOGY | Facility: CLINIC | Age: 69
End: 2019-05-06

## 2019-05-06 ENCOUNTER — HOSPITAL ENCOUNTER (OUTPATIENT)
Dept: RADIOLOGY | Facility: HOSPITAL | Age: 69
Discharge: HOME | End: 2019-05-06
Attending: RADIOLOGY
Payer: COMMERCIAL

## 2019-05-06 VITALS
TEMPERATURE: 98 F | WEIGHT: 293 LBS | HEIGHT: 72 IN | DIASTOLIC BLOOD PRESSURE: 90 MMHG | SYSTOLIC BLOOD PRESSURE: 166 MMHG | RESPIRATION RATE: 20 BRPM | HEART RATE: 80 BPM | BODY MASS INDEX: 39.68 KG/M2 | OXYGEN SATURATION: 100 %

## 2019-05-06 DIAGNOSIS — R07.9 CHEST PAIN: ICD-10-CM

## 2019-05-06 DIAGNOSIS — I10 ESSENTIAL HYPERTENSION: ICD-10-CM

## 2019-05-06 DIAGNOSIS — N64.4 BREAST PAIN, RIGHT: Primary | ICD-10-CM

## 2019-05-06 LAB
ANION GAP SERPL CALC-SCNC: 9 MMOL/L (ref 8–16)
BASOPHILS # BLD AUTO: 0.05 K/UL (ref 0–0.2)
BASOPHILS NFR BLD: 0.6 % (ref 0–1.9)
BUN SERPL-MCNC: 20 MG/DL (ref 8–23)
CALCIUM SERPL-MCNC: 9.5 MG/DL (ref 8.7–10.5)
CHLORIDE SERPL-SCNC: 107 MMOL/L (ref 95–110)
CO2 SERPL-SCNC: 25 MMOL/L (ref 23–29)
CREAT SERPL-MCNC: 1.1 MG/DL (ref 0.5–1.4)
DIFFERENTIAL METHOD: ABNORMAL
EOSINOPHIL # BLD AUTO: 0.2 K/UL (ref 0–0.5)
EOSINOPHIL NFR BLD: 2.1 % (ref 0–8)
ERYTHROCYTE [DISTWIDTH] IN BLOOD BY AUTOMATED COUNT: 17.6 % (ref 11.5–14.5)
EST. GFR  (AFRICAN AMERICAN): >60 ML/MIN/1.73 M^2
EST. GFR  (NON AFRICAN AMERICAN): 52 ML/MIN/1.73 M^2
GLUCOSE SERPL-MCNC: 103 MG/DL (ref 70–110)
HCT VFR BLD AUTO: 35.9 % (ref 37–48.5)
HGB BLD-MCNC: 11.2 G/DL (ref 12–16)
LYMPHOCYTES # BLD AUTO: 2.8 K/UL (ref 1–4.8)
LYMPHOCYTES NFR BLD: 33 % (ref 18–48)
MCH RBC QN AUTO: 24.1 PG (ref 27–31)
MCHC RBC AUTO-ENTMCNC: 31.2 G/DL (ref 32–36)
MCV RBC AUTO: 77 FL (ref 82–98)
MONOCYTES # BLD AUTO: 0.7 K/UL (ref 0.3–1)
MONOCYTES NFR BLD: 7.9 % (ref 4–15)
NEUTROPHILS # BLD AUTO: 4.7 K/UL (ref 1.8–7.7)
NEUTROPHILS NFR BLD: 56.4 % (ref 38–73)
PLATELET # BLD AUTO: 258 K/UL (ref 150–350)
PMV BLD AUTO: 9.3 FL (ref 9.2–12.9)
POTASSIUM SERPL-SCNC: 3.7 MMOL/L (ref 3.5–5.1)
RBC # BLD AUTO: 4.64 M/UL (ref 4–5.4)
SODIUM SERPL-SCNC: 141 MMOL/L (ref 136–145)
WBC # BLD AUTO: 8.39 K/UL (ref 3.9–12.7)

## 2019-05-06 PROCEDURE — 85025 COMPLETE CBC W/AUTO DIFF WBC: CPT

## 2019-05-06 PROCEDURE — 96375 TX/PRO/DX INJ NEW DRUG ADDON: CPT

## 2019-05-06 PROCEDURE — 93005 ELECTROCARDIOGRAM TRACING: CPT

## 2019-05-06 PROCEDURE — 96374 THER/PROPH/DIAG INJ IV PUSH: CPT

## 2019-05-06 PROCEDURE — 63600175 PHARM REV CODE 636 W HCPCS: Performed by: EMERGENCY MEDICINE

## 2019-05-06 PROCEDURE — 93010 EKG 12-LEAD: ICD-10-PCS | Mod: ,,, | Performed by: INTERNAL MEDICINE

## 2019-05-06 PROCEDURE — 93010 ELECTROCARDIOGRAM REPORT: CPT | Mod: ,,, | Performed by: INTERNAL MEDICINE

## 2019-05-06 PROCEDURE — 80048 BASIC METABOLIC PNL TOTAL CA: CPT

## 2019-05-06 PROCEDURE — 99284 EMERGENCY DEPT VISIT MOD MDM: CPT | Mod: 25

## 2019-05-06 RX ORDER — ONDANSETRON 2 MG/ML
4 INJECTION INTRAMUSCULAR; INTRAVENOUS
Status: COMPLETED | OUTPATIENT
Start: 2019-05-06 | End: 2019-05-06

## 2019-05-06 RX ORDER — MORPHINE SULFATE 4 MG/ML
4 INJECTION, SOLUTION INTRAMUSCULAR; INTRAVENOUS
Status: COMPLETED | OUTPATIENT
Start: 2019-05-06 | End: 2019-05-06

## 2019-05-06 RX ORDER — KETOROLAC TROMETHAMINE 30 MG/ML
15 INJECTION, SOLUTION INTRAMUSCULAR; INTRAVENOUS
Status: COMPLETED | OUTPATIENT
Start: 2019-05-06 | End: 2019-05-06

## 2019-05-06 RX ADMIN — MORPHINE SULFATE 4 MG: 4 INJECTION INTRAVENOUS at 11:05

## 2019-05-06 RX ADMIN — ONDANSETRON 4 MG: 2 INJECTION INTRAMUSCULAR; INTRAVENOUS at 11:05

## 2019-05-06 RX ADMIN — KETOROLAC TROMETHAMINE 15 MG: 30 INJECTION, SOLUTION INTRAMUSCULAR; INTRAVENOUS at 11:05

## 2019-05-06 NOTE — TELEPHONE ENCOUNTER
Spoke with patient to see how she was doing after ER visit and if she wanted to r/s her CT/SIM appt in radiation oncology. Patient stated she wanted to wait until she sees her PCP Dr Rojas next week and then r/s after BP is under control. Will f/u with patient next week.

## 2019-05-06 NOTE — PLAN OF CARE
Problem: Adult Inpatient Plan of Care  Goal: Plan of Care Review  Outcome: Ongoing (interventions implemented as appropriate)  Patient here for CT/SIM today. Patient feeling dizzy and weak. /114 left arm. 201/130 right arm. Dr Arreguin notified and examined patient. Patient escorted to the ER via wheelchair with  for further evaluation.

## 2019-05-06 NOTE — ED PROVIDER NOTES
SCRIBE #1 NOTE: I, Corinne Mack, am scribing for, and in the presence of, Ronny Gomes Do, MD. I have scribed the entire note.      History      Chief Complaint   Patient presents with    Hypertension     Pt sent [facility] by [facility] with hypertension       Review of patient's allergies indicates:   Allergen Reactions    Pcn [penicillins] Rash        HPI   HPI    5/6/2019, 10:10 AM   History obtained from the patient      History of Present Illness: Susu Luther is a 69 y.o. female patient with PMHx of HTN and breast CA who presents to the Emergency Department for evaluation of elevated BP which onset gradually a few weeks ago. [facility], but was referred to the ED for evaluation because the pt's BP has been high. Pt reports that her BP has been 210s/110s at home for the last month and that she has seen many doctors for this issue. Pt's BP is 167/103 in the ED; pt reports that her baseline BP is 150-160s/110s. Pt c/o R breast pain which onset 03/27/19 when the pt had R breast surgery for her CA. Pt has f/u with her surgeon and states that while she is in pain her pain has been improving somewhat. Symptoms are constant and moderate in severity. No mitigating or exacerbating factors reported. No associated sxs reported. Patient denies any fever, chills, CP, SOB, N/V/D, abd pain, back pain, neck pain, HA, and all other sxs at this time. No prior Tx reported. No further complaints or concerns at this time.         Arrival mode: Personal vehicle    PCP: Tomasz Cage MD       Past Medical History:  Past Medical History:   Diagnosis Date    Encounter for blood transfusion     Hypertension     Malignant neoplasm of upper-outer quadrant of right breast in female, estrogen receptor positive 3/14/2019       Past Surgical History:  Past Surgical History:   Procedure Laterality Date    APPENDECTOMY      BIOPSY, LYMPH NODE, SENTINEL Right 3/27/2019     [facility]     SECTION      x 1    LUMPECTOMY, BREAST Right 3/27/2019    [facility]    OOPHORECTOMY      right          Family History:  Family History   Problem Relation Age of Onset    Diabetes Maternal Grandmother     Diabetes Maternal Grandfather     Diabetes Mother     Breast cancer Neg Hx     Colon cancer Neg Hx     Ovarian cancer Neg Hx        Social History:  Social History     Tobacco Use    Smoking status: Never Smoker    Smokeless tobacco: Never Used   Substance and Sexual Activity    Alcohol use: No    Drug use: No    Sexual activity: Yes     Partners: Male     Birth control/protection: Post-menopausal       ROS   Review of Systems   Constitutional: Negative for chills and fever.   Respiratory: Negative for cough and shortness of breath.    Cardiovascular: Negative for chest pain and leg swelling.        (+) HTN   Gastrointestinal: Negative for abdominal pain, diarrhea, nausea and vomiting.   Musculoskeletal: Negative for back pain, neck pain and neck stiffness.        (+) R breast pain   Skin: Negative for rash and wound.   Neurological: Negative for dizziness, light-headedness, numbness and headaches.   All other systems reviewed and are negative.    Physical Exam      Initial Vitals [19 0922]   BP Pulse Resp Temp SpO2   (!) 168/101 87 20 97.9 °F (36.6 °C) 95 %      MAP       --          Physical Exam  Nursing Notes and Vital Signs Reviewed.  Constitutional: Patient is in no acute distress. Well-developed and well-nourished.  Head: Atraumatic. Normocephalic.  Eyes: PERRL. EOM intact. Conjunctivae are not pale. No scleral icterus.  ENT: Mucous membranes are moist. Oropharynx is clear and symmetric.    Neck: Supple. Full ROM. No lymphadenopathy.  Cardiovascular: Regular rate. Regular rhythm. No murmurs, rubs, or gallops. Distal pulses are 2+ and symmetric.  Pulmonary/Chest: No respiratory distress. Clear to  auscultation bilaterally. No wheezing or rales. Generalized, superficial TTP over the R breast with no masses palpated or signs of infection noted. R breast pain with elevation of the RUE.  Abdominal: Soft and non-distended.  There is no tenderness.  No rebound, guarding, or rigidity. Obese. R axilla pain with elevation of the RUE with no palpable lymph nodes or signs of infection noted.  Musculoskeletal: Moves all extremities. No obvious deformities. No edema.   Skin: Warm and dry.  Neurological:  Alert, awake, and appropriate.  Normal speech.  No acute focal neurological deficits are appreciated.  Psychiatric: Normal affect. Good eye contact. Appropriate in content.    ED Course    Procedures  ED Vital Signs:  Vitals:    05/06/19 0922 05/06/19 0931 05/06/19 0935 05/06/19 1005   BP: (!) 168/101  (!) 175/107 (!) 167/103   Pulse: 87  80 79   Resp: 20  (!) 24 (!) 24   Temp: 97.9 °F (36.6 °C)      TempSrc: Oral      SpO2: 95%  99% 99%   Weight:  (!) 148.1 kg (326 lb 9.6 oz)     Height: 6' (1.829 m)       05/06/19 1030 05/06/19 1205 05/06/19 1400   BP:  (!) 165/95 (!) 166/90   Pulse: 74 76 80   Resp:  (!) 21 20   Temp:   98 °F (36.7 °C)   TempSrc:   Oral   SpO2:  100% 100%   Weight:      Height:          Abnormal Lab Results:  Labs Reviewed   CBC W/ AUTO DIFFERENTIAL - Abnormal; Notable for the following components:       Result Value    Hemoglobin 11.2 (*)     Hematocrit 35.9 (*)     Mean Corpuscular Volume 77 (*)     Mean Corpuscular Hemoglobin 24.1 (*)     Mean Corpuscular Hemoglobin Conc 31.2 (*)     RDW 17.6 (*)     All other components within normal limits   BASIC METABOLIC PANEL - Abnormal; Notable for the following components:    eGFR if non  52 (*)     All other components within normal limits        All Lab Results:  Results for orders placed or performed during the hospital encounter of 05/06/19   CBC auto differential   Result Value Ref Range    WBC 8.39 3.90 - 12.70 K/uL    RBC 4.64 4.00 -  5.40 M/uL    Hemoglobin 11.2 (L) 12.0 - 16.0 g/dL    Hematocrit 35.9 (L) 37.0 - 48.5 %    Mean Corpuscular Volume 77 (L) 82 - 98 fL    Mean Corpuscular Hemoglobin 24.1 (L) 27.0 - 31.0 pg    Mean Corpuscular Hemoglobin Conc 31.2 (L) 32.0 - 36.0 g/dL    RDW 17.6 (H) 11.5 - 14.5 %    Platelets 258 150 - 350 K/uL    MPV 9.3 9.2 - 12.9 fL    Gran # (ANC) 4.7 1.8 - 7.7 K/uL    Lymph # 2.8 1.0 - 4.8 K/uL    Mono # 0.7 0.3 - 1.0 K/uL    Eos # 0.2 0.0 - 0.5 K/uL    Baso # 0.05 0.00 - 0.20 K/uL    Gran% 56.4 38.0 - 73.0 %    Lymph% 33.0 18.0 - 48.0 %    Mono% 7.9 4.0 - 15.0 %    Eosinophil% 2.1 0.0 - 8.0 %    Basophil% 0.6 0.0 - 1.9 %    Differential Method Automated    Basic metabolic panel   Result Value Ref Range    Sodium 141 136 - 145 mmol/L    Potassium 3.7 3.5 - 5.1 mmol/L    Chloride 107 95 - 110 mmol/L    CO2 25 23 - 29 mmol/L    Glucose 103 70 - 110 mg/dL    BUN, Bld 20 8 - 23 mg/dL    Creatinine 1.1 0.5 - 1.4 mg/dL    Calcium 9.5 8.7 - 10.5 mg/dL    Anion Gap 9 8 - 16 mmol/L    eGFR if African American >60 >60 mL/min/1.73 m^2    eGFR if non African American 52 (A) >60 mL/min/1.73 m^2       Imaging Results:  Imaging Results          US Chest Mediastinum (Final result)  Result time 05/06/19 12:57:23    Final result by Gregorio Banks MD (05/06/19 12:57:23)                 Impression:      See above      Electronically signed by: Gregorio Banks MD  Date:    05/06/2019  Time:    12:57             Narrative:    EXAMINATION:  US CHEST MEDIASTINUM    CLINICAL HISTORY:  s/p rt breast surgery c/o pain;    TECHNIQUE:  Limited ultrasound right breast    COMPARISON:  None    FINDINGS:  Limited ultrasound right breast demonstrates no focal fluid collections or abscess.                               X-Ray Chest AP Portable (Final result)  Result time 05/06/19 10:57:21    Final result by Aaron Cantu MD (05/06/19 10:57:21)                 Impression:      No acute cardiopulmonary disease.      Electronically signed by: Aaron  MD Pepe  Date:    05/06/2019  Time:    10:57             Narrative:    EXAMINATION:  XR CHEST AP PORTABLE    CLINICAL HISTORY:  chest pain;    COMPARISON:  Chest x-ray, 12/28/2018    FINDINGS:  The lungs are clear. The cardiac silhouette is within normal limits. No pleural effusion or pneumothorax or pulmonary edema.                                 The EKG was ordered, reviewed, and independently interpreted by the ED provider.  Interpretation time: 1045  Rate: 74 BPM  Rhythm: normal sinus rhythm  Interpretation: L axis deviation. Incomplete RBBB. Minimal voltage criteria for LVH. Septal infarct. No STEMI.           The Emergency Provider reviewed the vital signs and test results, which are outlined above.    ED Discussion     10:47 AM: Dr. Simons discussed the pt's case with Dr. Starks (General Surgery) who recommends getting an US done. If there are no fluid collections or acute process seen then the pt can f/u in the clinic on an out-pt basis.    1:26 PM: Reassessed pt at this time. Pt is awake, alert, and in no distress. Discussed with pt all pertinent ED information and results. Discussed pt dx and plan of tx. Gave pt all f/u and return to the ED instructions. All questions and concerns were addressed at this time. Pt expresses understanding of information and instructions, and is comfortable with plan to discharge. Pt is stable for discharge. BP stable entire time in ER roughly 160/90s which is her baseline blood pressure.  No meds given in ER for blood pressure.  She plans to f/u with her pcp for further blood pressure management.     I discussed with patient and/or family/caretaker that evaluation in the ED does not suggest any emergent or life threatening medical conditions requiring immediate intervention beyond what was provided in the ED, and I believe patient is safe for discharge.  Regardless, an unremarkable evaluation in the ED does not preclude the development or presence of a serious of life  threatening condition. As such, patient was instructed to return immediately for any worsening or change in current symptoms.      ED Medication(s):  Medications   morphine injection 4 mg (4 mg Intravenous Given 5/6/19 1102)   ondansetron injection 4 mg (4 mg Intravenous Given 5/6/19 1101)   ketorolac injection 15 mg (15 mg Intravenous Given 5/6/19 1101)          Medication List      ASK your doctor about these medications    amLODIPine 10 MG tablet  Commonly known as:  NORVASC     blood pressure kit med and lrg Kit  Take blood pressure first thing in the morning.     butalbital-acetaminophen-caff -40 mg -40 mg Cap  Take 1 tablet by mouth 3 (three) times daily.     cholecalciferol (vitamin D3) 50,000 unit capsule     diazePAM 10 MG Tab  Commonly known as:  VALIUM     hydroCHLOROthiazide 12.5 mg capsule  Commonly known as:  MICROZIDE     losartan 50 MG tablet  Commonly known as:  COZAAR  Take 1 tablet (50 mg total) by mouth once daily.     methylPREDNISolone 4 mg tablet  Commonly known as:  MEDROL DOSEPACK     oxyCODONE-acetaminophen 7.5-325 mg per tablet  Commonly known as:  PERCOCET  Take 1 tablet by mouth every 6 (six) hours as needed for Pain.            Follow-up Information     Kennedy Rojas MD In 1 day.    Specialty:  Family Medicine  Contact information:  [address] [facility]  [place] [id number] [phone]             Artemio Starks MD In 2 days.    Specialty:  General Surgery  Contact information:  [address] [facility]  [place] [address]  [phone]                     Medical Decision Making    Medical Decision Making:   Clinical Tests:   Lab Tests: Ordered and Reviewed  Radiological Study: Ordered and Reviewed  Medical Tests: Ordered and Reviewed           Scribe Attestation:   Scribe #1: I performed the above scribed service and the documentation accurately describes the services I performed. I attest to the accuracy of the note 05/06/2019.    Attending:   Physician Attestation  Statement for Scribe #1: I, Ronny Gomes Do, MD, personally performed the services described in this documentation, as scribed by Corinne Mack, in my presence, and it is both accurate and complete.          Clinical Impression       ICD-10-CM ICD-9-CM   1. Breast pain, right N64.4 611.71   2. Chest pain R07.9 786.50   3. Essential hypertension I10 401.9       Disposition:   Disposition: Discharged  Condition: Stable           Ronny Gomes Do, MD  05/06/19 1856       Ronny Gomes Do, MD  05/06/19 1919

## 2019-05-06 NOTE — ED NOTES
Patient c/o right sided breast pain; reports she is currently in treatment for breast cancer and recently had lymph nodes and some breast tissue removed but continues to be in pain. Patient reports she was supposed to begin radiation therapy today, but Dr. Arreguin sent her to the ER for evaluation.    Patient moved to ED room 14, patient assisted onto stretcher and changed into a gown. Patient placed on cardiac monitor, continuous pulse oximetry and automatic blood pressure cuff. Bed placed in low locked position, side rails up x 2, call light is within reach of patient or family, orientation to room and explanation of wait provided to family and patient, alarms set and turned on for monitor and pulse ox, awaiting MD evaluation and orders, will continue to monitor.    Patient identifies self as Susu Luther      LOC: The patient is awake, alert and aware of environment with an appropriate affect, the patient is oriented x 3 and speaking appropriately.  APPEARANCE: Patient resting comfortably and in no acute distress, patient is clean and well groomed, patient's clothing is properly fastened.  SKIN: The skin is warm and dry, color consistent with ethnicity, patient has normal skin turgor and moist mucus membranes, skin intact, no breakdown or bruising noted.  MUSCULOSKELETAL: Patient moving all extremities well, no obvious swelling or deformities noted.  RESPIRATORY: Airway is open and patent, respirations are spontaneous, patient has a normal effort and rate, no accessory muscle use noted.  CARDIAC: Patient has a normal rate and rhythm, no periphreal edema noted, capillary refill < 3 seconds.  ABDOMEN: Soft and non tender to palpation, no distention noted.  NEUROLOGIC: PERRL, 3 mm bilaterally, eyes open spontaneously, behavior appropriate to situation, follows commands, facial expression symmetrical, bilateral hand grasp equal and even, purposeful motor response noted, normal sensation in all extremities when  touched with a finger.    Limb alert bracelet placed on right wrist at this time.

## 2019-05-06 NOTE — TELEPHONE ENCOUNTER
----- Message from Katey Meraz sent at 5/6/2019  3:25 PM CDT -----  Contact: self  states that blood pressure was over 200 today although doubling up on meds per dr as discussed Friday 5/3 (went to [facility] er & now its 165/95) , please advise (urgent per patient)..[phone] or [phone]

## 2019-05-07 ENCOUNTER — NURSE TRIAGE (OUTPATIENT)
Dept: ADMINISTRATIVE | Facility: CLINIC | Age: 69
End: 2019-05-07

## 2019-05-07 ENCOUNTER — TELEPHONE (OUTPATIENT)
Dept: GASTROENTEROLOGY | Facility: CLINIC | Age: 69
End: 2019-05-07

## 2019-05-07 NOTE — TELEPHONE ENCOUNTER
----- Message from Avani Ch sent at 5/7/2019  3:14 PM CDT -----  Contact: pt  .Type:  Patient Returning Call    Who Called: pt  Who Left Message for Patient:   Does the patient know what this is regarding?:   Would the patient rather a call back or a response via [facility]?  Call back   Best Call Back Number: [phone]     Additional Information:

## 2019-05-07 NOTE — TELEPHONE ENCOUNTER
Reason for Disposition   Systolic BP >= 160 OR Diastolic >= 100, and any cardiac or neurologic symptoms (e.g., chest pain, difficulty breathing, unsteady gait, blurred vision)    Protocols used: HIGH BLOOD PRESSURE-A-OH    Pt reports HTN is 160s/120s with dizziness. Pt was seen in ER yesterday for the same. Per protocol advised ER. Pt refused. obins to speak with PCP. Please call to advise

## 2019-05-08 ENCOUNTER — TELEPHONE (OUTPATIENT)
Dept: RADIATION ONCOLOGY | Facility: CLINIC | Age: 69
End: 2019-05-08

## 2019-05-08 RX ORDER — CLONIDINE HYDROCHLORIDE 0.1 MG/1
0.1 TABLET ORAL 3 TIMES DAILY
Qty: 90 TABLET | Refills: 1 | Status: SHIPPED | OUTPATIENT
Start: 2019-05-08 | End: 2019-06-10 | Stop reason: SDUPTHER

## 2019-05-09 ENCOUNTER — TELEPHONE (OUTPATIENT)
Dept: INTERNAL MEDICINE | Facility: CLINIC | Age: 69
End: 2019-05-09

## 2019-05-09 NOTE — TELEPHONE ENCOUNTER
Notified patient of clonidine 0.1 mg rx at her preferred [facility]. Also scheduled patient follow-up appointment with Dr. Rojas as per below:    Future Appointments   Date Time Provider Department Center   5/10/2019  2:20 PM MD WIL[facility] [place]   5/13/2019 10:00 AM [facility] CT1 [facility] CTSCAN [facility]   5/13/2019 10:00 AM BR SIMULATION, RADIATION [facility] RAD THE [facility]   5/14/2019 11:00 AM Kennedy Rojas MD [facility] JOSETTE [place]   5/16/2019 10:20 AM Artemoi Starks MD [facility] GENSUR [facility]   6/6/2019  1:00 PM Guido Huynh MD [facility] HEM ONC [facility]     Patient verbalized understanding of appointment date, time, and clinic location. Patient thanked me and ended call.

## 2019-05-09 NOTE — TELEPHONE ENCOUNTER
Amy Vaughan MA  You 3 hours ago (9:09 AM)      Please call this Pt. And give her instructions Per. Dr. Rojas...//[email or web address]    Routing comment        MD Amy [place] MA Yesterday (5:27 AM)      I sent in clonidine .1 mg needs to take it 3 times a day. I want to see her in clinic on friday    Routing comment

## 2019-05-09 NOTE — TELEPHONE ENCOUNTER
----- Message from Earlene Whiteside sent at 5/9/2019  2:51 PM CDT -----  Contact: pt  .Type:  Patient Returning Call    Who Called:PT  Who Left Message for Patient: nurse MARIO  Does the patient know what this is regarding?: MEDICATION   Would the patient rather a call back or a response via [facility]? CALL BACK  Best Call Back Number: [phone] (home)     Additional Information: ....

## 2019-05-10 ENCOUNTER — TELEPHONE (OUTPATIENT)
Dept: URGENT CARE | Facility: CLINIC | Age: 69
End: 2019-05-10

## 2019-05-10 NOTE — TELEPHONE ENCOUNTER
I called to inform the pt that the provider has booked out due to the weather . I offered her an appt with another provider she declined stated that she and  has been dealing with her high Bp for about 2 wks and I tried to get her to see someone since it was 178/120 with a pulse of 91 she took her Bp meds at 6am this morning . I verbalized understanding and informed her if it gets any higher to go to the ER. She verbalized understanding .//kah

## 2019-05-13 PROCEDURE — 77263 PR  RADIATION THERAPY PLAN COMPLEX: ICD-10-PCS | Mod: ,,, | Performed by: RADIOLOGY

## 2019-05-13 PROCEDURE — 77290 PR  SET RADN THERAPY FIELD COMPLEX: ICD-10-PCS | Mod: 26,,, | Performed by: RADIOLOGY

## 2019-05-13 PROCEDURE — 77334 RADIATION TREATMENT AID(S): CPT | Mod: TC | Performed by: RADIOLOGY

## 2019-05-13 PROCEDURE — 77334 PR  RADN TREATMENT AID(S) COMPLX: ICD-10-PCS | Mod: 26,,, | Performed by: RADIOLOGY

## 2019-05-13 PROCEDURE — 77263 THER RADIOLOGY TX PLNG CPLX: CPT | Mod: ,,, | Performed by: RADIOLOGY

## 2019-05-13 PROCEDURE — 77290 THER RAD SIMULAJ FIELD CPLX: CPT | Mod: 26,,, | Performed by: RADIOLOGY

## 2019-05-13 PROCEDURE — 77334 RADIATION TREATMENT AID(S): CPT | Mod: 26,,, | Performed by: RADIOLOGY

## 2019-05-13 PROCEDURE — 77290 THER RAD SIMULAJ FIELD CPLX: CPT | Mod: TC | Performed by: RADIOLOGY

## 2019-05-13 PROCEDURE — 77014 HC CT GUIDANCE RADIATION THERAPY FLDS PLACEMENT: CPT | Mod: TC | Performed by: RADIOLOGY

## 2019-05-14 ENCOUNTER — OFFICE VISIT (OUTPATIENT)
Dept: INTERNAL MEDICINE | Facility: CLINIC | Age: 69
End: 2019-05-14
Payer: MEDICARE

## 2019-05-14 DIAGNOSIS — R45.86 MOOD SWINGS: ICD-10-CM

## 2019-05-14 DIAGNOSIS — I10 ESSENTIAL HYPERTENSION: Primary | Chronic | ICD-10-CM

## 2019-05-14 PROCEDURE — 99214 OFFICE O/P EST MOD 30 MIN: CPT | Mod: S$GLB,,, | Performed by: FAMILY MEDICINE

## 2019-05-14 PROCEDURE — 1101F PT FALLS ASSESS-DOCD LE1/YR: CPT | Mod: CPTII,S$GLB,, | Performed by: FAMILY MEDICINE

## 2019-05-14 PROCEDURE — 1101F PR PT FALLS ASSESS DOC 0-1 FALLS W/OUT INJ PAST YR: ICD-10-PCS | Mod: CPTII,S$GLB,, | Performed by: FAMILY MEDICINE

## 2019-05-14 PROCEDURE — 3075F SYST BP GE 130 - 139MM HG: CPT | Mod: CPTII,S$GLB,, | Performed by: FAMILY MEDICINE

## 2019-05-14 PROCEDURE — 99999 PR PBB SHADOW E&M-EST. PATIENT-LVL IV: ICD-10-PCS | Mod: PBBFAC,,, | Performed by: FAMILY MEDICINE

## 2019-05-14 PROCEDURE — 99214 PR OFFICE/OUTPT VISIT, EST, LEVL IV, 30-39 MIN: ICD-10-PCS | Mod: S$GLB,,, | Performed by: FAMILY MEDICINE

## 2019-05-14 PROCEDURE — 3075F PR MOST RECENT SYSTOLIC BLOOD PRESS GE 130-139MM HG: ICD-10-PCS | Mod: CPTII,S$GLB,, | Performed by: FAMILY MEDICINE

## 2019-05-14 PROCEDURE — 3079F DIAST BP 80-89 MM HG: CPT | Mod: CPTII,S$GLB,, | Performed by: FAMILY MEDICINE

## 2019-05-14 PROCEDURE — 99999 PR PBB SHADOW E&M-EST. PATIENT-LVL IV: CPT | Mod: PBBFAC,,, | Performed by: FAMILY MEDICINE

## 2019-05-14 PROCEDURE — 3079F PR MOST RECENT DIASTOLIC BLOOD PRESSURE 80-89 MM HG: ICD-10-PCS | Mod: CPTII,S$GLB,, | Performed by: FAMILY MEDICINE

## 2019-05-14 RX ORDER — OLANZAPINE 10 MG/1
10 TABLET ORAL NIGHTLY
Qty: 30 TABLET | Refills: 1 | Status: SHIPPED | OUTPATIENT
Start: 2019-05-14 | End: 2019-05-18

## 2019-05-14 RX ORDER — LOSARTAN POTASSIUM 100 MG/1
100 TABLET ORAL DAILY
Qty: 90 TABLET | Refills: 3 | Status: ON HOLD | OUTPATIENT
Start: 2019-05-14 | End: 2019-05-20 | Stop reason: HOSPADM

## 2019-05-14 RX ORDER — DIAZEPAM 10 MG/1
10 TABLET ORAL DAILY
Qty: 30 TABLET | Refills: 1 | Status: SHIPPED | OUTPATIENT
Start: 2019-05-14 | End: 2019-05-24 | Stop reason: SDUPTHER

## 2019-05-14 NOTE — PROGRESS NOTES
Subjective:       Patient ID: Susu Luther is a 69 y.o. female.    Chief Complaint: Follow-up (2 week-up HTN follow-up) and SOB (has not had pulmonary follow-up)    F/u:      Pt blood pressure is more stable today and pt reporting similar pressure at home. Pt has NILS and is pending followup from Pulmonary. She is also complaining of mood swing primarily due to her medical conditions. Pt was placed on valium prior to my taking over are but not working    Review of Systems   Constitutional: Negative.    Respiratory: Negative.    Cardiovascular: Negative.    Genitourinary: Negative.    Musculoskeletal: Negative.    Neurological: Negative.    Hematological: Negative.        Objective:      Physical Exam   Constitutional: She is oriented to person, place, and time. She appears well-developed and well-nourished.   Cardiovascular: Normal rate and regular rhythm. Exam reveals no friction rub.   No murmur heard.  Pulmonary/Chest: Effort normal and breath sounds normal.   Abdominal: Soft. Bowel sounds are normal.   Neurological: She is alert and oriented to person, place, and time.   Skin: Skin is warm and dry.       Assessment:       1. Essential hypertension    2. Mood swings        Plan:       Essential hypertension  Comments:  Go up on the clonidine to .2 mg three times a day and stay on the losartan    Mood swings  Comments:  Start olanazapine    Other orders  -     OLANZapine (ZYPREXA) 10 MG tablet; Take 1 tablet (10 mg total) by mouth every evening.  Dispense: 30 tablet; Refill: 1  -     losartan (COZAAR) 100 MG tablet; Take 1 tablet (100 mg total) by mouth once daily.  Dispense: 90 tablet; Refill: 3  -     diazePAM (VALIUM) 10 MG Tab; Take 1 tablet (10 mg total) by mouth once daily.  Dispense: 30 tablet; Refill: 1

## 2019-05-16 ENCOUNTER — OFFICE VISIT (OUTPATIENT)
Dept: SURGERY | Facility: CLINIC | Age: 69
End: 2019-05-16
Payer: COMMERCIAL

## 2019-05-16 VITALS
BODY MASS INDEX: 44.25 KG/M2 | WEIGHT: 293 LBS | SYSTOLIC BLOOD PRESSURE: 193 MMHG | TEMPERATURE: 98 F | DIASTOLIC BLOOD PRESSURE: 108 MMHG | HEART RATE: 89 BPM

## 2019-05-16 DIAGNOSIS — N64.4 BREAST PAIN: ICD-10-CM

## 2019-05-16 DIAGNOSIS — Z17.0 MALIGNANT NEOPLASM OF UPPER-OUTER QUADRANT OF RIGHT BREAST IN FEMALE, ESTROGEN RECEPTOR POSITIVE: Primary | ICD-10-CM

## 2019-05-16 DIAGNOSIS — C50.411 MALIGNANT NEOPLASM OF UPPER-OUTER QUADRANT OF RIGHT BREAST IN FEMALE, ESTROGEN RECEPTOR POSITIVE: Primary | ICD-10-CM

## 2019-05-16 PROCEDURE — 77334 PR  RADN TREATMENT AID(S) COMPLX: ICD-10-PCS | Mod: 26,,, | Performed by: RADIOLOGY

## 2019-05-16 PROCEDURE — 99999 PR PBB SHADOW E&M-EST. PATIENT-LVL III: ICD-10-PCS | Mod: PBBFAC,,, | Performed by: SURGERY

## 2019-05-16 PROCEDURE — 77295 PR 3D RADIOTHERAPY PLAN: ICD-10-PCS | Mod: 26,,, | Performed by: RADIOLOGY

## 2019-05-16 PROCEDURE — 99024 PR POST-OP FOLLOW-UP VISIT: ICD-10-PCS | Mod: S$GLB,,, | Performed by: SURGERY

## 2019-05-16 PROCEDURE — 77300 RADIATION THERAPY DOSE PLAN: CPT | Mod: 26,,, | Performed by: RADIOLOGY

## 2019-05-16 PROCEDURE — 77295 3-D RADIOTHERAPY PLAN: CPT | Mod: 26,,, | Performed by: RADIOLOGY

## 2019-05-16 PROCEDURE — 77300 RADIATION THERAPY DOSE PLAN: CPT | Mod: TC | Performed by: RADIOLOGY

## 2019-05-16 PROCEDURE — 77300 PR RADIATION THERAPY,DOSIMETRY PLAN: ICD-10-PCS | Mod: 26,,, | Performed by: RADIOLOGY

## 2019-05-16 PROCEDURE — 77334 RADIATION TREATMENT AID(S): CPT | Mod: TC | Performed by: RADIOLOGY

## 2019-05-16 PROCEDURE — 77295 3-D RADIOTHERAPY PLAN: CPT | Mod: TC | Performed by: RADIOLOGY

## 2019-05-16 PROCEDURE — 99024 POSTOP FOLLOW-UP VISIT: CPT | Mod: S$GLB,,, | Performed by: SURGERY

## 2019-05-16 PROCEDURE — 77334 RADIATION TREATMENT AID(S): CPT | Mod: 26,,, | Performed by: RADIOLOGY

## 2019-05-16 PROCEDURE — 99999 PR PBB SHADOW E&M-EST. PATIENT-LVL III: CPT | Mod: PBBFAC,,, | Performed by: SURGERY

## 2019-05-16 RX ORDER — GABAPENTIN 300 MG/1
300 CAPSULE ORAL 3 TIMES DAILY
Qty: 90 CAPSULE | Refills: 6 | Status: SHIPPED | OUTPATIENT
Start: 2019-05-16 | End: 2019-09-05

## 2019-05-16 NOTE — PROGRESS NOTES
Subjective:       Patient ID: Susu Luther is a 69 y.o. female.    Breast pain    Chief Complaint: Follow-up (1 mnth follow up)    Patient reports hypersensitivity and pain of the right breast in the upper outer quadrant area after her lumpectomy and sentinel node biopsy.  She states the pain is severe.  It hurts when she moves her arm operative she touches the area.  Light touch can cause some mild discomfort but pressure causes significant discomfort.  She was seen in the emergency room for this.  An ultrasound was done that showed no abnormal fluid collections.    She has not had a follow-up visit with physical therapy    She currently has issues with high blood pressure.  One issue we have in taking her blood pressure here is the proper size cup    Review of Systems   Skin:        Breast pain       Objective:      Physical Exam   Constitutional:   Morbidly obese   Cardiovascular: Normal rate.   Pulmonary/Chest: Effort normal and breath sounds normal.   Abdominal: Soft. Bowel sounds are normal.   Obese   Skin:   The right breast was examined.  There may be some mild edema in the upper outer aspect between the 2 incisions.  There is no evidence of a fluid collection.  There is no erythema.  She does have tenderness and palpation of the right breast.  The incisions are well healed.  There is no atul evidence of arm edema   Vitals reviewed.      Assessment:       1. Malignant neoplasm of upper-outer quadrant of right breast in female, estrogen receptor positive    2. Breast pain        Plan:       With regard to the breast pain I have asked to purchase a well-fitting and supportive sports bra as opposed to an underwire bra.    We can try gabapentin 300 mg on day 1, 300 mg twice a day on day 2 and then 300 mg t.i.d. to see if that helps.    Follow-up as needed

## 2019-05-16 NOTE — PATIENT INSTRUCTIONS
Hypersensitivity after breast cancer surgery is not unusual.  We can try a medicine called gabapentin.  It is 1 pill on the 1st day, 2 pills on the 2nd day and 3 fills from the 3rd day on out.  Let me know if this helps.    You can also purchase a well thinning, supportive sports bra without under wires.  We will also need to get back to physical therapy      It is possible that the radiation treatment may change the pain, you can further discuss that with Dr. Arreguin.    If the gabapentin helps with the pain let us know and we can always go up on the dose

## 2019-05-17 ENCOUNTER — DOCUMENTATION ONLY (OUTPATIENT)
Dept: RADIATION ONCOLOGY | Facility: CLINIC | Age: 69
End: 2019-05-17

## 2019-05-17 VITALS
DIASTOLIC BLOOD PRESSURE: 84 MMHG | HEART RATE: 94 BPM | SYSTOLIC BLOOD PRESSURE: 134 MMHG | BODY MASS INDEX: 44.01 KG/M2 | OXYGEN SATURATION: 98 % | WEIGHT: 293 LBS | TEMPERATURE: 98 F

## 2019-05-17 PROCEDURE — 77280 THER RAD SIMULAJ FIELD SMPL: CPT | Mod: TC | Performed by: RADIOLOGY

## 2019-05-17 PROCEDURE — 77280 THER RAD SIMULAJ FIELD SMPL: CPT | Mod: 26,,, | Performed by: RADIOLOGY

## 2019-05-17 PROCEDURE — 77280 PR  SET RADN THERAPY FIELD SIMPLE: ICD-10-PCS | Mod: 26,,, | Performed by: RADIOLOGY

## 2019-05-17 NOTE — PLAN OF CARE
Problem: Adult Inpatient Plan of Care  Goal: Plan of Care Review  Outcome: Ongoing (interventions implemented as appropriate)  Film only for outpatient xrt to right breast. Breast handout and verbal instructions given. Contact info provided. Skin care and side effects reviewed. Answered all questions. Patient verbalized understanding.

## 2019-05-18 ENCOUNTER — HOSPITAL ENCOUNTER (INPATIENT)
Facility: HOSPITAL | Age: 69
LOS: 1 days | Discharge: HOME HEALTH | DRG: 305 | End: 2019-05-20
Attending: EMERGENCY MEDICINE | Admitting: INTERNAL MEDICINE
Payer: MEDICARE

## 2019-05-18 ENCOUNTER — NURSE TRIAGE (OUTPATIENT)
Dept: ADMINISTRATIVE | Facility: CLINIC | Age: 69
End: 2019-05-18

## 2019-05-18 DIAGNOSIS — R06.00 DYSPNEA AND RESPIRATORY ABNORMALITIES: ICD-10-CM

## 2019-05-18 DIAGNOSIS — Z17.0 MALIGNANT NEOPLASM OF UPPER-OUTER QUADRANT OF RIGHT BREAST IN FEMALE, ESTROGEN RECEPTOR POSITIVE: Chronic | ICD-10-CM

## 2019-05-18 DIAGNOSIS — R47.01 EXPRESSIVE APHASIA: Primary | ICD-10-CM

## 2019-05-18 DIAGNOSIS — R47.81 SLURRED SPEECH: ICD-10-CM

## 2019-05-18 DIAGNOSIS — R09.89 LABILE BLOOD PRESSURE: ICD-10-CM

## 2019-05-18 DIAGNOSIS — R06.2 WHEEZE: ICD-10-CM

## 2019-05-18 DIAGNOSIS — E66.01 CLASS 3 SEVERE OBESITY DUE TO EXCESS CALORIES WITH SERIOUS COMORBIDITY AND BODY MASS INDEX (BMI) OF 50.0 TO 59.9 IN ADULT: ICD-10-CM

## 2019-05-18 DIAGNOSIS — G47.33 OSA (OBSTRUCTIVE SLEEP APNEA): Chronic | ICD-10-CM

## 2019-05-18 DIAGNOSIS — I10 HYPERTENSION: ICD-10-CM

## 2019-05-18 DIAGNOSIS — C50.411 MALIGNANT NEOPLASM OF UPPER-OUTER QUADRANT OF RIGHT BREAST IN FEMALE, ESTROGEN RECEPTOR POSITIVE: Chronic | ICD-10-CM

## 2019-05-18 DIAGNOSIS — R06.89 DYSPNEA AND RESPIRATORY ABNORMALITIES: ICD-10-CM

## 2019-05-18 DIAGNOSIS — I16.0 HYPERTENSIVE URGENCY, MALIGNANT: ICD-10-CM

## 2019-05-18 LAB
ALBUMIN SERPL BCP-MCNC: 2.9 G/DL (ref 3.5–5.2)
ALP SERPL-CCNC: 129 U/L (ref 55–135)
ALT SERPL W/O P-5'-P-CCNC: 26 U/L (ref 10–44)
ANION GAP SERPL CALC-SCNC: 8 MMOL/L (ref 8–16)
AST SERPL-CCNC: 19 U/L (ref 10–40)
BASOPHILS # BLD AUTO: 0.03 K/UL (ref 0–0.2)
BASOPHILS NFR BLD: 0.4 % (ref 0–1.9)
BILIRUB SERPL-MCNC: 0.2 MG/DL (ref 0.1–1)
BILIRUB UR QL STRIP: NEGATIVE
BNP SERPL-MCNC: 122 PG/ML (ref 0–99)
BUN SERPL-MCNC: 20 MG/DL (ref 8–23)
CALCIUM SERPL-MCNC: 9.4 MG/DL (ref 8.7–10.5)
CHLORIDE SERPL-SCNC: 114 MMOL/L (ref 95–110)
CHOLEST SERPL-MCNC: 229 MG/DL (ref 120–199)
CHOLEST/HDLC SERPL: 5.3 {RATIO} (ref 2–5)
CK SERPL-CCNC: 93 U/L (ref 20–180)
CLARITY UR: CLEAR
CO2 SERPL-SCNC: 22 MMOL/L (ref 23–29)
COLOR UR: YELLOW
CREAT SERPL-MCNC: 1.2 MG/DL (ref 0.5–1.4)
DIFFERENTIAL METHOD: ABNORMAL
EOSINOPHIL # BLD AUTO: 0.3 K/UL (ref 0–0.5)
EOSINOPHIL NFR BLD: 3.5 % (ref 0–8)
ERYTHROCYTE [DISTWIDTH] IN BLOOD BY AUTOMATED COUNT: 17.3 % (ref 11.5–14.5)
EST. GFR  (AFRICAN AMERICAN): 54 ML/MIN/1.73 M^2
EST. GFR  (NON AFRICAN AMERICAN): 47 ML/MIN/1.73 M^2
ESTIMATED AVG GLUCOSE: 123 MG/DL (ref 68–131)
GLUCOSE SERPL-MCNC: 91 MG/DL (ref 70–110)
GLUCOSE UR QL STRIP: NEGATIVE
HBA1C MFR BLD HPLC: 5.9 % (ref 4–5.6)
HCT VFR BLD AUTO: 34.6 % (ref 37–48.5)
HDLC SERPL-MCNC: 43 MG/DL (ref 40–75)
HDLC SERPL: 18.8 % (ref 20–50)
HGB BLD-MCNC: 10.8 G/DL (ref 12–16)
HGB UR QL STRIP: NEGATIVE
KETONES UR QL STRIP: NEGATIVE
LDLC SERPL CALC-MCNC: 148 MG/DL (ref 63–159)
LEUKOCYTE ESTERASE UR QL STRIP: NEGATIVE
LYMPHOCYTES # BLD AUTO: 2.6 K/UL (ref 1–4.8)
LYMPHOCYTES NFR BLD: 31.2 % (ref 18–48)
MCH RBC QN AUTO: 24.2 PG (ref 27–31)
MCHC RBC AUTO-ENTMCNC: 31.2 G/DL (ref 32–36)
MCV RBC AUTO: 77 FL (ref 82–98)
MONOCYTES # BLD AUTO: 0.6 K/UL (ref 0.3–1)
MONOCYTES NFR BLD: 6.5 % (ref 4–15)
NEUTROPHILS # BLD AUTO: 4.9 K/UL (ref 1.8–7.7)
NEUTROPHILS NFR BLD: 58.4 % (ref 38–73)
NITRITE UR QL STRIP: NEGATIVE
NONHDLC SERPL-MCNC: 186 MG/DL
PH UR STRIP: 6 [PH] (ref 5–8)
PLATELET # BLD AUTO: 278 K/UL (ref 150–350)
PMV BLD AUTO: 8.9 FL (ref 9.2–12.9)
POTASSIUM SERPL-SCNC: 4 MMOL/L (ref 3.5–5.1)
PROT SERPL-MCNC: 6.9 G/DL (ref 6–8.4)
PROT UR QL STRIP: NEGATIVE
RBC # BLD AUTO: 4.47 M/UL (ref 4–5.4)
SODIUM SERPL-SCNC: 144 MMOL/L (ref 136–145)
SP GR UR STRIP: 1.01 (ref 1–1.03)
TRIGL SERPL-MCNC: 190 MG/DL (ref 30–150)
TROPONIN I SERPL DL<=0.01 NG/ML-MCNC: 0.01 NG/ML (ref 0–0.03)
TROPONIN I SERPL DL<=0.01 NG/ML-MCNC: 0.01 NG/ML (ref 0–0.03)
TROPONIN I SERPL DL<=0.01 NG/ML-MCNC: 0.02 NG/ML (ref 0–0.03)
TSH SERPL DL<=0.005 MIU/L-ACNC: 0.8 UIU/ML (ref 0.4–4)
URN SPEC COLLECT METH UR: NORMAL
UROBILINOGEN UR STRIP-ACNC: NEGATIVE EU/DL
WBC # BLD AUTO: 8.46 K/UL (ref 3.9–12.7)

## 2019-05-18 PROCEDURE — 93306 TTE W/DOPPLER COMPLETE: CPT | Mod: 26,,, | Performed by: INTERNAL MEDICINE

## 2019-05-18 PROCEDURE — 36415 COLL VENOUS BLD VENIPUNCTURE: CPT

## 2019-05-18 PROCEDURE — 93306 2D ECHO WITH COLOR FLOW DOPPLER: ICD-10-PCS | Mod: 26,,, | Performed by: INTERNAL MEDICINE

## 2019-05-18 PROCEDURE — 83036 HEMOGLOBIN GLYCOSYLATED A1C: CPT

## 2019-05-18 PROCEDURE — 63600175 PHARM REV CODE 636 W HCPCS: Performed by: NURSE PRACTITIONER

## 2019-05-18 PROCEDURE — 25000003 PHARM REV CODE 250: Performed by: NURSE PRACTITIONER

## 2019-05-18 PROCEDURE — A4216 STERILE WATER/SALINE, 10 ML: HCPCS | Performed by: EMERGENCY MEDICINE

## 2019-05-18 PROCEDURE — G0378 HOSPITAL OBSERVATION PER HR: HCPCS

## 2019-05-18 PROCEDURE — 84484 ASSAY OF TROPONIN QUANT: CPT | Mod: 91

## 2019-05-18 PROCEDURE — 93010 EKG 12-LEAD: ICD-10-PCS | Mod: ,,, | Performed by: INTERNAL MEDICINE

## 2019-05-18 PROCEDURE — A4216 STERILE WATER/SALINE, 10 ML: HCPCS | Performed by: NURSE PRACTITIONER

## 2019-05-18 PROCEDURE — 80053 COMPREHEN METABOLIC PANEL: CPT

## 2019-05-18 PROCEDURE — 81003 URINALYSIS AUTO W/O SCOPE: CPT

## 2019-05-18 PROCEDURE — 93005 ELECTROCARDIOGRAM TRACING: CPT

## 2019-05-18 PROCEDURE — 96374 THER/PROPH/DIAG INJ IV PUSH: CPT

## 2019-05-18 PROCEDURE — 63600175 PHARM REV CODE 636 W HCPCS: Performed by: INTERNAL MEDICINE

## 2019-05-18 PROCEDURE — 93306 TTE W/DOPPLER COMPLETE: CPT

## 2019-05-18 PROCEDURE — 99285 EMERGENCY DEPT VISIT HI MDM: CPT | Mod: 25

## 2019-05-18 PROCEDURE — 25000003 PHARM REV CODE 250: Performed by: EMERGENCY MEDICINE

## 2019-05-18 PROCEDURE — 80061 LIPID PANEL: CPT

## 2019-05-18 PROCEDURE — 93010 ELECTROCARDIOGRAM REPORT: CPT | Mod: ,,, | Performed by: INTERNAL MEDICINE

## 2019-05-18 PROCEDURE — 83880 ASSAY OF NATRIURETIC PEPTIDE: CPT

## 2019-05-18 PROCEDURE — 84443 ASSAY THYROID STIM HORMONE: CPT

## 2019-05-18 PROCEDURE — 82550 ASSAY OF CK (CPK): CPT

## 2019-05-18 PROCEDURE — 84484 ASSAY OF TROPONIN QUANT: CPT

## 2019-05-18 PROCEDURE — 99900037 HC PT THERAPY SCREENING (STAT)

## 2019-05-18 PROCEDURE — 85025 COMPLETE CBC W/AUTO DIFF WBC: CPT

## 2019-05-18 RX ORDER — ACETAMINOPHEN 325 MG/1
650 TABLET ORAL EVERY 6 HOURS PRN
Status: DISCONTINUED | OUTPATIENT
Start: 2019-05-18 | End: 2019-05-19

## 2019-05-18 RX ORDER — ATORVASTATIN CALCIUM 40 MG/1
40 TABLET, FILM COATED ORAL DAILY
Status: DISCONTINUED | OUTPATIENT
Start: 2019-05-18 | End: 2019-05-20 | Stop reason: HOSPADM

## 2019-05-18 RX ORDER — SODIUM CHLORIDE 0.9 % (FLUSH) 0.9 %
3 SYRINGE (ML) INJECTION EVERY 8 HOURS
Status: DISCONTINUED | OUTPATIENT
Start: 2019-05-18 | End: 2019-05-20 | Stop reason: HOSPADM

## 2019-05-18 RX ORDER — HYDRALAZINE HYDROCHLORIDE 20 MG/ML
10 INJECTION INTRAMUSCULAR; INTRAVENOUS EVERY 8 HOURS PRN
Status: DISCONTINUED | OUTPATIENT
Start: 2019-05-18 | End: 2019-05-19

## 2019-05-18 RX ORDER — DIPHENHYDRAMINE HCL 25 MG
25 CAPSULE ORAL EVERY 6 HOURS PRN
Status: DISCONTINUED | OUTPATIENT
Start: 2019-05-18 | End: 2019-05-20 | Stop reason: HOSPADM

## 2019-05-18 RX ORDER — KETOROLAC TROMETHAMINE 30 MG/ML
15 INJECTION, SOLUTION INTRAMUSCULAR; INTRAVENOUS EVERY 6 HOURS PRN
Status: COMPLETED | OUTPATIENT
Start: 2019-05-18 | End: 2019-05-20

## 2019-05-18 RX ORDER — LOSARTAN POTASSIUM 50 MG/1
100 TABLET ORAL NIGHTLY
Status: DISCONTINUED | OUTPATIENT
Start: 2019-05-18 | End: 2019-05-19

## 2019-05-18 RX ORDER — ASPIRIN 81 MG/1
81 TABLET ORAL DAILY
Status: DISCONTINUED | OUTPATIENT
Start: 2019-05-18 | End: 2019-05-20 | Stop reason: HOSPADM

## 2019-05-18 RX ORDER — ONDANSETRON 2 MG/ML
4 INJECTION INTRAMUSCULAR; INTRAVENOUS EVERY 8 HOURS PRN
Status: DISCONTINUED | OUTPATIENT
Start: 2019-05-18 | End: 2019-05-20 | Stop reason: HOSPADM

## 2019-05-18 RX ORDER — CLONIDINE HYDROCHLORIDE 0.1 MG/1
0.1 TABLET ORAL 3 TIMES DAILY
Status: DISCONTINUED | OUTPATIENT
Start: 2019-05-18 | End: 2019-05-19

## 2019-05-18 RX ADMIN — ASPIRIN 81 MG: 81 TABLET, COATED ORAL at 01:05

## 2019-05-18 RX ADMIN — Medication 3 ML: at 01:05

## 2019-05-18 RX ADMIN — HYDRALAZINE HYDROCHLORIDE 10 MG: 20 INJECTION INTRAMUSCULAR; INTRAVENOUS at 04:05

## 2019-05-18 RX ADMIN — KETOROLAC TROMETHAMINE 15 MG: 30 INJECTION, SOLUTION INTRAMUSCULAR at 07:05

## 2019-05-18 RX ADMIN — CLONIDINE HYDROCHLORIDE 0.1 MG: 0.1 TABLET ORAL at 09:05

## 2019-05-18 RX ADMIN — ACETAMINOPHEN 650 MG: 325 TABLET ORAL at 05:05

## 2019-05-18 RX ADMIN — CLONIDINE HYDROCHLORIDE 0.1 MG: 0.1 TABLET ORAL at 03:05

## 2019-05-18 RX ADMIN — LOSARTAN POTASSIUM 100 MG: 50 TABLET, FILM COATED ORAL at 09:05

## 2019-05-18 RX ADMIN — Medication 3 ML: at 09:05

## 2019-05-18 RX ADMIN — ATORVASTATIN CALCIUM 40 MG: 40 TABLET, FILM COATED ORAL at 01:05

## 2019-05-18 NOTE — HPI
Susu Luther is a 69 year old female with a PMHx of HTN, CVA (2009), Right breast CA, Lumpectomy 3/2019 who presented to the ER with c/o slurred speech x 1 month but worsened today around 2:00 AM. Associated symptoms include: headache. Pt reports her headache kept her up most the night to the point she took her BP at 3 AM. Pt reports systolic around 3 AM was 193. She reports taking a clonidine shortly thereafter. Headache and BP did not improve and subsequently came to ED to be evaluated. Pt is followed by Dr. Balbina Ashton at [facility]. Pt reports she tried to call and schedule appt with Dr. Ashton for slurred speech but not able to get in with her until September. ED workup showed: WBC count 8.46K, Hgb/Hct 10.8/34.6, , troponin 0.007. UA negative. CXR with no acute findings. CT head with no acute intracranial findings. MRI pending. Observation for aphasia.

## 2019-05-18 NOTE — SUBJECTIVE & OBJECTIVE
Past Medical History:   Diagnosis Date    Encounter for blood transfusion     Hypertension     Malignant neoplasm of upper-outer quadrant of right breast in female, estrogen receptor positive 3/14/2019       Past Surgical History:   Procedure Laterality Date    APPENDECTOMY      BIOPSY, LYMPH NODE, SENTINEL Right 3/27/2019    Performed [facility] at [facility] OR     SECTION      x 1    LUMPECTOMY, BREAST Right 3/27/2019    Performed by [facility]arks [facility] at [facility] OR    OOPHORECTOMY      right        Review of patient's allergies indicates:   Allergen Reactions    Pcn [penicillins] Rash       No current facility-administered medications on file prior to encounter.      Current Outpatient Medications on File Prior to Encounter   Medication Sig    butalbital-acetaminophen-caff -40 mg -40 mg Cap Take 1 tablet by mouth 3 (three) times daily.    cholecalciferol, vitamin D3, 50,000 unit capsule Take 50,000 Units by mouth once a week.     cloNIDine (CATAPRES) 0.1 MG tablet Take 1 tablet (0.1 mg total) by mouth 3 (three) times daily.    diazePAM (VALIUM) 10 MG Tab Take 1 tablet (10 mg total) by mouth once daily.    gabapentin (NEURONTIN) 300 MG capsule Take 1 capsule (300 mg total) by mouth 3 (three) times daily. One capsule on the 1st day, 2 capsules on the 2nd day and then t.i.d.    losartan (COZAAR) 100 MG tablet Take 1 tablet (100 mg total) by mouth once daily. (Patient taking differently: Take 100 mg by mouth every evening. )    oxyCODONE-acetaminophen (PERCOCET) 7.5-325 mg per tablet Take 1 tablet by mouth every 6 (six) hours as needed for Pain.    anastrozole (ARIMIDEX) 1 mg Tab Take 1 tablet (1 mg total) by mouth once daily.    blood pressure kit med and lrg Kit Take blood pressure first thing in the morning.    [DISCONTINUED] hydroCHLOROthiazide (MICROZIDE) 12.5 mg capsule Take 12.5 mg by mouth once daily.     [DISCONTINUED] OLANZapine (ZYPREXA) 10 MG tablet Take 1  tablet (10 mg total) by mouth every evening.     Family History     Problem Relation (Age of Onset)    Diabetes Maternal Grandmother, Maternal Grandfather, Mother        Tobacco Use    Smoking status: Never Smoker    Smokeless tobacco: Never Used   Substance and Sexual Activity    Alcohol use: No    Drug use: No    Sexual activity: Yes     Partners: Male     Birth control/protection: Post-menopausal     Review of Systems   Constitutional: Positive for activity change. Negative for appetite change, chills and fever.   HENT: Negative for trouble swallowing.    Eyes: Negative for visual disturbance.   Respiratory: Negative for apnea, cough, choking, chest tightness, shortness of breath, wheezing and stridor.    Cardiovascular: Negative for chest pain, palpitations and leg swelling.   Gastrointestinal: Negative for abdominal pain, constipation, diarrhea, nausea and vomiting.   Genitourinary: Negative for decreased urine volume, difficulty urinating, dysuria, frequency and urgency.   Musculoskeletal: Negative for arthralgias, back pain, gait problem, joint swelling, myalgias, neck pain and neck stiffness.   Skin: Negative for color change.   Neurological: Positive for speech difficulty, weakness and headaches. Negative for dizziness, tremors, seizures, syncope, facial asymmetry, light-headedness and numbness.   Hematological: Does not bruise/bleed easily.   Psychiatric/Behavioral: Negative for agitation and behavioral problems.     Objective:     Vital Signs (Most Recent):  Temp: 97.5 °F (36.4 °C) (05/18/19 1346)  Pulse: 69 (05/18/19 1346)  Resp: 20 (05/18/19 1346)  BP: (!) 165/87 (05/18/19 1346)  SpO2: 99 % (05/18/19 1346) Vital Signs (24h Range):  Temp:  [97.5 °F (36.4 °C)-97.9 °F (36.6 °C)] 97.5 °F (36.4 °C)  Pulse:  [69-85] 69  Resp:  [19-23] 20  SpO2:  [96 %-99 %] 99 %  BP: (152-174)/() 165/87     Weight: (!) 150.8 kg (332 lb 8 oz)  Body mass index is 49.1 kg/m².    Physical Exam   Constitutional: She is  oriented to person, place, and time. She appears well-developed and well-nourished. She is cooperative. She is easily aroused.   Obese, pleasant woman   HENT:   Head: Normocephalic and atraumatic.   Eyes: EOM are normal.   Neck: Normal range of motion. Neck supple.   Cardiovascular: Normal rate, regular rhythm and intact distal pulses.   No murmur heard.  Pulmonary/Chest: Effort normal and breath sounds normal. No stridor. No respiratory distress. She has no wheezes. She has no rales. She exhibits no tenderness.   Abdominal: Soft. Bowel sounds are normal. She exhibits no distension. There is no tenderness. There is no rebound and no guarding.   Genitourinary:   Genitourinary Comments: Deferred   Musculoskeletal: She exhibits no edema, tenderness or deformity.   Neurological: She is alert, oriented to person, place, and time and easily aroused. She has normal strength. No sensory deficit. GCS eye subscore is 4. GCS verbal subscore is 5. GCS motor subscore is 6.   Expressive aphasia noted   Skin: Skin is warm and dry.   Psychiatric: She has a normal mood and affect. Her behavior is normal. Thought content normal.   Nursing note and vitals reviewed.        CRANIAL NERVES     CN III, IV, VI   Extraocular motions are normal.        Significant Labs:   CBC:   Recent Labs   Lab 05/18/19  1100   WBC 8.46   HGB 10.8*   HCT 34.6*        CMP:   Recent Labs   Lab 05/18/19  1100      K 4.0   *   CO2 22*   GLU 91   BUN 20   CREATININE 1.2   CALCIUM 9.4   PROT 6.9   ALBUMIN 2.9*   BILITOT 0.2   ALKPHOS 129   AST 19   ALT 26   ANIONGAP 8   EGFRNONAA 47*     Troponin:   Recent Labs   Lab 05/18/19  1100   TROPONINI 0.007     Urine Studies:   Recent Labs   Lab 05/18/19  1211   COLORU Yellow   APPEARANCEUA Clear   PHUR 6.0   SPECGRAV 1.010   PROTEINUA Negative   GLUCUA Negative   KETONESU Negative   BILIRUBINUA Negative   OCCULTUA Negative   NITRITE Negative   UROBILINOGEN Negative   LEUKOCYTESUR Negative     BNP:   122    CK:  93    Significant Imaging:   Imaging Results          MRI BRAIN WITHOUT CONTRAST (In process)                CT Head Without Contrast (Final result)  Result time 05/18/19 12:40:20    Final result by Rosalio Stallworth Jr., MD (05/18/19 12:40:20)                 Impression:      No acute intracranial findings evident.    All CT scans at this facility are performed  using dose modulation techniques as appropriate to performed exam including the following:  automated exposure control; adjustment of mA and/or kV according to the patients size (this includes techniques or standardized protocols for targeted exams where dose is matched to indication/reason for exam: i.e. extremities or head);  iterative reconstruction technique.      Electronically signed by: Rosalio Stallworth MD  Date:    05/18/2019  Time:    12:40             Narrative:    EXAMINATION:  CT HEAD WITHOUT CONTRAST    CLINICAL HISTORY:  Headache, chronic, new features or neuro deficit;    TECHNIQUE:  Noncontrast axial images of the head were obtained.    COMPARISON:  No comparison studies are available.    FINDINGS:  Ventricular system is normal.  No hydrocephalus.  No midline shift.  No abnormal density to indicate acute major vascular distribution ischemic infarction or hemorrhage.  No mass effect.  No extra-axial fluid collections.    Visualized paranasal sinuses and mastoid air cells appear clear.    No calvarial fracture.                               X-Ray Chest AP Portable (Final result)  Result time 05/18/19 11:16:50    Final result by Rosalio Stallworth Jr., MD (05/18/19 11:16:50)                 Impression:      No acute findings.      Electronically signed by: Rosalio Stallworth MD  Date:    05/18/2019  Time:    11:16             Narrative:    EXAMINATION:  XR CHEST AP PORTABLE    CLINICAL HISTORY:  HTN;    COMPARISON:  05/06/2019    FINDINGS:  Heart size is at the upper limits of normal.  Shallow degree of inspiration..  Lungs appear clear  of active disease.  No infiltrates or effusions.  No suspicious mass.

## 2019-05-18 NOTE — ED PROVIDER NOTES
SCRIBE #1 NOTE: I, Edouard Unger, am scribing for, and in the presence of, Melecio Arce MD. I have scribed the entire note.       History     Chief Complaint   Patient presents with    Hypertension     +HTN at home. Woke up this morning with slurring of speech x 1 week. (increased this morning). +HA, blurred vision.      Review of patient's allergies indicates:   Allergen Reactions    Pcn [penicillins] Rash         History of Present Illness     HPI    2019, 10:45 AM  History obtained from the patient      History of Present Illness: Susu Luther is a 69 y.o. female patient who presents to the Emergency Department for evaluation of slurred speech which onset 1 month ago and worsened today. Symptoms are constant and moderate in severity. No mitigating or exacerbating factors reported. Associated sxs include dizziness, HA, blurred vision, hand tremors, and HTN. Patient denies any fever, chills, CP, palpitations, leg swelling, numbness, weakness, abd pain, n/v/d, cough, facial droop, eye pain, syncope, and all other sxs at this time. No further complaints or concerns at this time.         Arrival mode: Personal vehicle    PCP: Tomasz Cage MD        Past Medical History:  Past Medical History:   Diagnosis Date    Encounter for blood transfusion     Hypertension     Malignant neoplasm of upper-outer quadrant of right breast in female, estrogen receptor positive 3/14/2019       Past Surgical History:  Past Surgical History:   Procedure Laterality Date    APPENDECTOMY      BIOPSY, LYMPH NODE, SENTINEL Right 3/27/2019    Performed [facility] at [facility]     SECTION      x 1    LUMPECTOMY, BREAST Right 3/27/2019    [facility] at [facility]    OOPHORECTOMY      right          Family History:  Family History   Problem Relation Age of Onset    Diabetes Maternal Grandmother     Diabetes Maternal Grandfather     Diabetes Mother     Breast cancer Neg Hx      Colon cancer Neg Hx     Ovarian cancer Neg Hx        Social History:  Social History     Tobacco Use    Smoking status: Never Smoker    Smokeless tobacco: Never Used   Substance and Sexual Activity    Alcohol use: No    Drug use: No    Sexual activity: Yes     Partners: Male     Birth control/protection: Post-menopausal        Review of Systems     Review of Systems   Constitutional: Negative for chills and fever.   HENT: Negative for sore throat.    Eyes: Positive for visual disturbance (blurred). Negative for photophobia, pain, discharge and redness.   Respiratory: Negative for cough and shortness of breath.    Cardiovascular: Negative for chest pain, palpitations and leg swelling.        (+) HTN   Gastrointestinal: Negative for abdominal pain, diarrhea, nausea and vomiting.   Genitourinary: Negative for dysuria.   Musculoskeletal: Negative for back pain.   Skin: Negative for rash.   Neurological: Positive for dizziness, tremors (b/l hands), speech difficulty (slurred) and headaches. Negative for seizures, syncope, facial asymmetry, weakness and numbness.   Hematological: Does not bruise/bleed easily.   All other systems reviewed and are negative.     Physical Exam     Initial Vitals [05/18/19 1037]   BP Pulse Resp Temp SpO2   (!) 174/109 84 20 97.9 °F (36.6 °C) 96 %      MAP       --          Physical Exam  Nursing Notes and Vital Signs Reviewed.  Constitutional: Patient is in no acute distress. Obese.  Head: Atraumatic. Normocephalic.  Eyes: PERRL. EOM intact. Conjunctivae are not pale. No scleral icterus.  ENT: Mucous membranes are moist. Oropharynx is clear and symmetric.    Neck: Supple. Full ROM. No lymphadenopathy.  Cardiovascular: Regular rate. Regular rhythm. No murmurs, rubs, or gallops. Distal pulses are 2+ and symmetric.  Pulmonary/Chest: No respiratory distress. Clear to auscultation bilaterally. No wheezing or rales.  Abdominal: Soft and non-distended.  There is no tenderness.  No rebound,  "guarding, or rigidity. Good bowel sounds.  Genitourinary: No CVA tenderness  Musculoskeletal: Moves all extremities. No obvious deformities. No edema. No calf tenderness.  Skin: Warm and dry.  Neurological:  Alert, awake, and appropriate.  Mildly slurred speech. Mild expressive aphasia  Psychiatric: Normal affect. Good eye contact. Appropriate in content.     ED Course   Procedures  ED Vital Signs:  Vitals:    05/18/19 1037 05/18/19 1053 05/18/19 1056 05/18/19 1141   BP: (!) 174/109      Pulse: 84 85     Resp: 20   (!) 23   Temp: 97.9 °F (36.6 °C)      TempSrc: Oral      SpO2: 96%      Weight:   (!) 150.8 kg (332 lb 8 oz)    Height: 5' 9" (1.753 m)       05/18/19 1146 05/18/19 1217 05/18/19 1218 05/18/19 1236   BP: (!) 152/98 (!) 157/94  (!) 165/87   Pulse: 69 70  69   Resp:   19    Temp:       TempSrc:       SpO2: 96% 97%  98%   Weight:       Height:        05/18/19 1237   BP:    Pulse:    Resp: 20   Temp:    TempSrc:    SpO2:    Weight:    Height:        Abnormal Lab Results:  Labs Reviewed   CBC W/ AUTO DIFFERENTIAL - Abnormal; Notable for the following components:       Result Value    Hemoglobin 10.8 (*)     Hematocrit 34.6 (*)     Mean Corpuscular Volume 77 (*)     Mean Corpuscular Hemoglobin 24.2 (*)     Mean Corpuscular Hemoglobin Conc 31.2 (*)     RDW 17.3 (*)     MPV 8.9 (*)     All other components within normal limits   COMPREHENSIVE METABOLIC PANEL - Abnormal; Notable for the following components:    Chloride 114 (*)     CO2 22 (*)     Albumin 2.9 (*)     eGFR if  54 (*)     eGFR if non  47 (*)     All other components within normal limits   B-TYPE NATRIURETIC PEPTIDE - Abnormal; Notable for the following components:     (*)     All other components within normal limits   URINALYSIS, REFLEX TO URINE CULTURE    Narrative:     Preferred Collection Type->Urine, Clean Catch   CK   TROPONIN I   LIPID PANEL   HEMOGLOBIN A1C   TSH        All Lab Results:  Results for " orders placed or performed during the hospital encounter of 05/18/19   CBC auto differential   Result Value Ref Range    WBC 8.46 3.90 - 12.70 K/uL    RBC 4.47 4.00 - 5.40 M/uL    Hemoglobin 10.8 (L) 12.0 - 16.0 g/dL    Hematocrit 34.6 (L) 37.0 - 48.5 %    Mean Corpuscular Volume 77 (L) 82 - 98 fL    Mean Corpuscular Hemoglobin 24.2 (L) 27.0 - 31.0 pg    Mean Corpuscular Hemoglobin Conc 31.2 (L) 32.0 - 36.0 g/dL    RDW 17.3 (H) 11.5 - 14.5 %    Platelets 278 150 - 350 K/uL    MPV 8.9 (L) 9.2 - 12.9 fL    Gran # (ANC) 4.9 1.8 - 7.7 K/uL    Lymph # 2.6 1.0 - 4.8 K/uL    Mono # 0.6 0.3 - 1.0 K/uL    Eos # 0.3 0.0 - 0.5 K/uL    Baso # 0.03 0.00 - 0.20 K/uL    Gran% 58.4 38.0 - 73.0 %    Lymph% 31.2 18.0 - 48.0 %    Mono% 6.5 4.0 - 15.0 %    Eosinophil% 3.5 0.0 - 8.0 %    Basophil% 0.4 0.0 - 1.9 %    Differential Method Automated    Comprehensive metabolic panel   Result Value Ref Range    Sodium 144 136 - 145 mmol/L    Potassium 4.0 3.5 - 5.1 mmol/L    Chloride 114 (H) 95 - 110 mmol/L    CO2 22 (L) 23 - 29 mmol/L    Glucose 91 70 - 110 mg/dL    BUN, Bld 20 8 - 23 mg/dL    Creatinine 1.2 0.5 - 1.4 mg/dL    Calcium 9.4 8.7 - 10.5 mg/dL    Total Protein 6.9 6.0 - 8.4 g/dL    Albumin 2.9 (L) 3.5 - 5.2 g/dL    Total Bilirubin 0.2 0.1 - 1.0 mg/dL    Alkaline Phosphatase 129 55 - 135 U/L    AST 19 10 - 40 U/L    ALT 26 10 - 44 U/L    Anion Gap 8 8 - 16 mmol/L    eGFR if African American 54 (A) >60 mL/min/1.73 m^2    eGFR if non African American 47 (A) >60 mL/min/1.73 m^2   Urinalysis, Reflex to Urine Culture Urine, Clean Catch   Result Value Ref Range    Specimen UA Urine, Clean Catch     Color, UA Yellow Yellow, Straw, Patricia    Appearance, UA Clear Clear    pH, UA 6.0 5.0 - 8.0    Specific Gravity, UA 1.010 1.005 - 1.030    Protein, UA Negative Negative    Glucose, UA Negative Negative    Ketones, UA Negative Negative    Bilirubin (UA) Negative Negative    Occult Blood UA Negative Negative    Nitrite, UA Negative Negative     Urobilinogen, UA Negative <2.0 EU/dL    Leukocytes, UA Negative Negative   Brain natriuretic peptide   Result Value Ref Range     (H) 0 - 99 pg/mL   CK   Result Value Ref Range    CPK 93 20 - 180 U/L   Troponin I   Result Value Ref Range    Troponin I 0.007 0.000 - 0.026 ng/mL         Imaging Results:  Imaging Results          CT Head Without Contrast (Final result)  Result time 05/18/19 12:40:20    Final result by Rosalio Stallworth Jr., MD (05/18/19 12:40:20)                 Impression:      No acute intracranial findings evident.    All CT scans at this facility are performed  using dose modulation techniques as appropriate to performed exam including the following:  automated exposure control; adjustment of mA and/or kV according to the patients size (this includes techniques or standardized protocols for targeted exams where dose is matched to indication/reason for exam: i.e. extremities or head);  iterative reconstruction technique.      Electronically signed by: Rosalio Stallworth MD  Date:    05/18/2019  Time:    12:40             Narrative:    EXAMINATION:  CT HEAD WITHOUT CONTRAST    CLINICAL HISTORY:  Headache, chronic, new features or neuro deficit;    TECHNIQUE:  Noncontrast axial images of the head were obtained.    COMPARISON:  No comparison studies are available.    FINDINGS:  Ventricular system is normal.  No hydrocephalus.  No midline shift.  No abnormal density to indicate acute major vascular distribution ischemic infarction or hemorrhage.  No mass effect.  No extra-axial fluid collections.    Visualized paranasal sinuses and mastoid air cells appear clear.    No calvarial fracture.                               X-Ray Chest AP Portable (Final result)  Result time 05/18/19 11:16:50    Final result by Rosalio Stallworth Jr., MD (05/18/19 11:16:50)                 Impression:      No acute findings.      Electronically signed by: Rosalio Stallworth MD  Date:    05/18/2019  Time:    11:16              Narrative:    EXAMINATION:  XR CHEST AP PORTABLE    CLINICAL HISTORY:  HTN;    COMPARISON:  05/06/2019    FINDINGS:  Heart size is at the upper limits of normal.  Shallow degree of inspiration..  Lungs appear clear of active disease.  No infiltrates or effusions.  No suspicious mass.                                 The EKG was ordered, reviewed, and independently interpreted by the ED provider.  Interpretation time: 1048  Rate: 82 BPM  Rhythm: normal sinus rhythm  Interpretation: LAD. Moderate voltage criteria for LVH, may be normal variant. No STEMI.             The Emergency Provider reviewed the vital signs and test results, which are outlined above.     ED Discussion     1:10 PM: Discussed case with JACKELINE Cooper, ([facility]). Dr. Good agrees with current care and management of pt and accepts admission.   Admitting Service: [facility] medicine   Admitting Physician: Florentino  Admit to: Obs/tele    1:13 PM: Re-evaluated pt. I have discussed test results, shared treatment plan, and the need for admission with patient and family at bedside. Pt and family express understanding at this time and agree with all information. All questions answered. Pt and family have no further questions or concerns at this time. Pt is ready for admit.          ED Medication(s):  Medications   sodium chloride 0.9% flush 3 mL (has no administration in time range)   sodium chloride 0.9% bolus 500 mL (has no administration in time range)   atorvastatin tablet 40 mg (has no administration in time range)   aspirin EC tablet 81 mg (has no administration in time range)       New Prescriptions    No medications on file                 Medical Decision Making:   Clinical Tests:   Lab Tests: Ordered and Reviewed  Radiological Study: Ordered and Reviewed  Medical Tests: Ordered and Reviewed             Scribe Attestation:   Scribe #1: I performed the above scribed service and the documentation accurately describes the services I performed.  I attest to the accuracy of the note.     Attending:   Physician Attestation Statement for Scribe #1: I, Melecio Arce MD, personally performed the services described in this documentation, as scribed by Edouard Unger, in my presence, and it is both accurate and complete.           Clinical Impression       ICD-10-CM ICD-9-CM   1. Expressive aphasia R47.01 784.3   2. Hypertension I10 401.9   3. Slurred speech R47.81 784.59   4. Labile blood pressure R09.89 796.2       Disposition:   Disposition: Placed in Observation  Condition: Fair         Melecio Arce MD  05/18/19 1251

## 2019-05-18 NOTE — ASSESSMENT & PLAN NOTE
- Observation for suspected CVA  - CT head with no acute intracranial findings  - MRI pending  - Carotid US and 2D ECHO pending  - Lipid panel and A1C pending  - Neuro checks  - Allow permissive HTN until MRI results, treat systolic BP > 220 with Labetalol. If MRI negative will resume home medications. Current systolic  mmHg  - Will need to consult Neurology if MRI (+) acute CVA  - PT/OT/SLP to evaluate and treat

## 2019-05-18 NOTE — ASSESSMENT & PLAN NOTE
- Allow permissive HTN for now  - Will treat BP if systolic > 220  - Once MRI negative, resume home medications including Losartan and Clonidine

## 2019-05-18 NOTE — H&P
[facility] Hospital Medicine  History & Physical    Patient Name: Susu Luther  MRN: [id number]  Admission Date: 2019  Attending Physician: Melecio Arce MD   Primary Care Provider: Tomasz Cage MD         Patient information was obtained from patient and ER records.     Subjective:     Principal Problem:Expressive aphasia    Chief Complaint:   Chief Complaint   Patient presents with    Hypertension     +HTN at home. Woke up this morning with slurring of speech x 1 week. (increased this morning). +HA, blurred vision.         HPI: Susu Luther is a 69 year old female with a PMHx of HTN, CVA (), Lumpectomy who presented to the Emergency Department with c/o slurred speech x 1 month but worsened today around 2:00 AM. Associated symptoms include: headache. Pt reports her headache kept her up most the night to the point she took her BP at 3 AM. Pt reports systolic around 3 AM was 193. She reports taking a clonidine shortly thereafter. Headache and BP did not improve and subsequently came to ED to be evaluated. Pt is followed by Dr. Balbina Ashton [facility]. Pt reports she tried to call and schedule appt with Dr. Ashton for slurred speech but not able to get in with her until September. ED workup showed: WBC count 8.46K, Hgb/Hct 10.8/34.6, , troponin 0.007. UA negative. CXR with no acute findings. CT head with no acute intracranial findings. MRI pending.     Past Medical History:   Diagnosis Date    Encounter for blood transfusion     Hypertension     Malignant neoplasm of upper-outer quadrant of right breast in female, estrogen receptor positive 3/14/2019       Past Surgical History:   Procedure Laterality Date    APPENDECTOMY      BIOPSY, LYMPH NODE, SENTINEL Right 3/27/2019    [facility]     SECTION      x 1    LUMPECTOMY, BREAST Right 3/27/2019    Performed by [facility] at [facility]    OOPHORECTOMY       right        Review of patient's allergies indicates:   Allergen Reactions    Pcn [penicillins] Rash       No current facility-administered medications on file prior to encounter.      Current Outpatient Medications on File Prior to Encounter   Medication Sig    butalbital-acetaminophen-caff -40 mg -40 mg Cap Take 1 tablet by mouth 3 (three) times daily.    cholecalciferol, vitamin D3, 50,000 unit capsule Take 50,000 Units by mouth once a week.     cloNIDine (CATAPRES) 0.1 MG tablet Take 1 tablet (0.1 mg total) by mouth 3 (three) times daily.    diazePAM (VALIUM) 10 MG Tab Take 1 tablet (10 mg total) by mouth once daily.    gabapentin (NEURONTIN) 300 MG capsule Take 1 capsule (300 mg total) by mouth 3 (three) times daily. One capsule on the 1st day, 2 capsules on the 2nd day and then t.i.d.    losartan (COZAAR) 100 MG tablet Take 1 tablet (100 mg total) by mouth once daily. (Patient taking differently: Take 100 mg by mouth every evening. )    oxyCODONE-acetaminophen (PERCOCET) 7.5-325 mg per tablet Take 1 tablet by mouth every 6 (six) hours as needed for Pain.    anastrozole (ARIMIDEX) 1 mg Tab Take 1 tablet (1 mg total) by mouth once daily.    blood pressure kit med and lrg Kit Take blood pressure first thing in the morning.    [DISCONTINUED] hydroCHLOROthiazide (MICROZIDE) 12.5 mg capsule Take 12.5 mg by mouth once daily.     [DISCONTINUED] OLANZapine (ZYPREXA) 10 MG tablet Take 1 tablet (10 mg total) by mouth every evening.     Family History     Problem Relation (Age of Onset)    Diabetes Maternal Grandmother, Maternal Grandfather, Mother        Tobacco Use    Smoking status: Never Smoker    Smokeless tobacco: Never Used   Substance and Sexual Activity    Alcohol use: No    Drug use: No    Sexual activity: Yes     Partners: Male     Birth control/protection: Post-menopausal     Review of Systems   Constitutional: Positive for activity change. Negative for appetite change, chills  and fever.   HENT: Negative for trouble swallowing.    Eyes: Negative for visual disturbance.   Respiratory: Negative for apnea, cough, choking, chest tightness, shortness of breath, wheezing and stridor.    Cardiovascular: Negative for chest pain, palpitations and leg swelling.   Gastrointestinal: Negative for abdominal pain, constipation, diarrhea, nausea and vomiting.   Genitourinary: Negative for decreased urine volume, difficulty urinating, dysuria, frequency and urgency.   Musculoskeletal: Negative for arthralgias, back pain, gait problem, joint swelling, myalgias, neck pain and neck stiffness.   Skin: Negative for color change.   Neurological: Positive for speech difficulty, weakness and headaches. Negative for dizziness, tremors, seizures, syncope, facial asymmetry, light-headedness and numbness.   Hematological: Does not bruise/bleed easily.   Psychiatric/Behavioral: Negative for agitation and behavioral problems.     Objective:     Vital Signs (Most Recent):  Temp: 97.5 °F (36.4 °C) (05/18/19 1346)  Pulse: 69 (05/18/19 1346)  Resp: 20 (05/18/19 1346)  BP: (!) 165/87 (05/18/19 1346)  SpO2: 99 % (05/18/19 1346) Vital Signs (24h Range):  Temp:  [97.5 °F (36.4 °C)-97.9 °F (36.6 °C)] 97.5 °F (36.4 °C)  Pulse:  [69-85] 69  Resp:  [19-23] 20  SpO2:  [96 %-99 %] 99 %  BP: (152-174)/() 165/87     Weight: (!) 150.8 kg (332 lb 8 oz)  Body mass index is 49.1 kg/m².    Physical Exam   Constitutional: She is oriented to person, place, and time. She appears well-developed and well-nourished. She is cooperative. She is easily aroused.   Obese, pleasant woman   HENT:   Head: Normocephalic and atraumatic.   Eyes: EOM are normal.   Neck: Normal range of motion. Neck supple.   Cardiovascular: Normal rate, regular rhythm and intact distal pulses.   No murmur heard.  Pulmonary/Chest: Effort normal and breath sounds normal. No stridor. No respiratory distress. She has no wheezes. She has no rales. She exhibits no  tenderness.   Abdominal: Soft. Bowel sounds are normal. She exhibits no distension. There is no tenderness. There is no rebound and no guarding.   Genitourinary:   Genitourinary Comments: Deferred   Musculoskeletal: She exhibits no edema, tenderness or deformity.   Neurological: She is alert, oriented to person, place, and time and easily aroused. She has normal strength. No sensory deficit. GCS eye subscore is 4. GCS verbal subscore is 5. GCS motor subscore is 6.   Expressive aphasia noted   Skin: Skin is warm and dry.   Psychiatric: She has a normal mood and affect. Her behavior is normal. Thought content normal.   Nursing note and vitals reviewed.        CRANIAL NERVES     CN III, IV, VI   Extraocular motions are normal.        Significant Labs:   CBC:   Recent Labs   Lab 05/18/19  1100   WBC 8.46   HGB 10.8*   HCT 34.6*        CMP:   Recent Labs   Lab 05/18/19  1100      K 4.0   *   CO2 22*   GLU 91   BUN 20   CREATININE 1.2   CALCIUM 9.4   PROT 6.9   ALBUMIN 2.9*   BILITOT 0.2   ALKPHOS 129   AST 19   ALT 26   ANIONGAP 8   EGFRNONAA 47*     Troponin:   Recent Labs   Lab 05/18/19  1100   TROPONINI 0.007     Urine Studies:   Recent Labs   Lab 05/18/19  1211   COLORU Yellow   APPEARANCEUA Clear   PHUR 6.0   SPECGRAV 1.010   PROTEINUA Negative   GLUCUA Negative   KETONESU Negative   BILIRUBINUA Negative   OCCULTUA Negative   NITRITE Negative   UROBILINOGEN Negative   LEUKOCYTESUR Negative     BNP:  122    CK:  93    Significant Imaging:   Imaging Results          MRI BRAIN WITHOUT CONTRAST (In process)                CT Head Without Contrast (Final result)  Result time 05/18/19 12:40:20    Final result by Rosalio Frost Jr., MD (05/18/19 12:40:20)                 Impression:      No acute intracranial findings evident.    All CT scans at this facility are performed  using dose modulation techniques as appropriate to performed exam including the following:  automated exposure control;  adjustment of mA and/or kV according to the patients size (this includes techniques or standardized protocols for targeted exams where dose is matched to indication/reason for exam: i.e. extremities or head);  iterative reconstruction technique.      Electronically signed by: Rosalio Stallworth MD  Date:    05/18/2019  Time:    12:40             Narrative:    EXAMINATION:  CT HEAD WITHOUT CONTRAST    CLINICAL HISTORY:  Headache, chronic, new features or neuro deficit;    TECHNIQUE:  Noncontrast axial images of the head were obtained.    COMPARISON:  No comparison studies are available.    FINDINGS:  Ventricular system is normal.  No hydrocephalus.  No midline shift.  No abnormal density to indicate acute major vascular distribution ischemic infarction or hemorrhage.  No mass effect.  No extra-axial fluid collections.    Visualized paranasal sinuses and mastoid air cells appear clear.    No calvarial fracture.                               X-Ray Chest AP Portable (Final result)  Result time 05/18/19 11:16:50    Final result by Rosalio Stallworth Jr., MD (05/18/19 11:16:50)                 Impression:      No acute findings.      Electronically signed by: Rosaloi Stallworth MD  Date:    05/18/2019  Time:    11:16             Narrative:    EXAMINATION:  XR CHEST AP PORTABLE    CLINICAL HISTORY:  HTN;    COMPARISON:  05/06/2019    FINDINGS:  Heart size is at the upper limits of normal.  Shallow degree of inspiration..  Lungs appear clear of active disease.  No infiltrates or effusions.  No suspicious mass.                              Assessment/Plan:     * Expressive aphasia  - Observation for suspected CVA  - CT head with no acute intracranial findings  - MRI pending  - Carotid US and 2D ECHO pending  - Lipid panel and A1C pending  - Neuro checks  - Allow permissive HTN until MRI results, treat systolic BP > 220 with Labetalol. If MRI negative will resume home medications. Current systolic  mmHg  - Will need to consult  Neurology if MRI (+) acute CVA  - PT/OT/SLP to evaluate and treat      Malignant neoplasm of upper-outer quadrant of right breast in female, estrogen receptor positive  - Followed by Dr. Huynh and Dr. Arreguin   - S/p lumpectomy and sentinel node biopsy on 03/27/19  - Pt reports she has been unable to start radiation to due BP issues  - F/U with Dr. Huynh and Dr. Arreguin upon discharge    Hypertension  - Allow permissive HTN for now  - Will treat BP if systolic > 220  - Once MRI negative, resume home medications including Losartan and Clonidine      VTE Risk Mitigation (From admission, onward)        Ordered     IP VTE HIGH RISK PATIENT  Once      05/18/19 1316     Place sequential compression device  Until discontinued      05/18/19 1316             JACKELINE Leonard  Department of Hospital Medicine   [facility]

## 2019-05-18 NOTE — ASSESSMENT & PLAN NOTE
- Followed by Dr. Huynh and Dr. Arreguin   - S/p lumpectomy and sentinel node biopsy on 03/27/19  - Pt reports she has been unable to start radiation to due BP issues  - F/U with Dr. Huynh and Dr. Arreguin upon discharge

## 2019-05-18 NOTE — NURSING
Pt arrived to unit from ED in no acute distress. BP elevated, NP aware. Tele monitor #[id number] applied to pt and verified with monitor tech. Phone report given from ED nurse to primary nurse ELZA Garcia. Bed low, side rails up x2, call light in reach. Family present at bedside. Pt instructed to call for assistance. Echo currently being done at bedside.

## 2019-05-18 NOTE — PLAN OF CARE
Problem: Adult Inpatient Plan of Care  Goal: Absence of Hospital-Acquired Illness or Injury    Intervention: Identify and Manage Fall Risk     05/18/19 1700   Optimize Balance and Safe Activity   Safety Promotion/Fall Prevention assistive device/personal item within reach;Fall Risk reviewed with patient/family;nonskid shoes/socks when out of bed;side rails raised x 2   Patient aox4 vss in no visible distress. Fall precautions and skin precautions in place. Bed in lowest locked position call light within reach. Plan of care gone over with patient no further questions at this time. Will continue to monitor.

## 2019-05-19 PROBLEM — I16.0 HYPERTENSIVE URGENCY, MALIGNANT: Status: ACTIVE | Noted: 2019-02-28

## 2019-05-19 LAB
ALBUMIN SERPL BCP-MCNC: 2.7 G/DL (ref 3.5–5.2)
ALP SERPL-CCNC: 123 U/L (ref 55–135)
ALT SERPL W/O P-5'-P-CCNC: 21 U/L (ref 10–44)
ANION GAP SERPL CALC-SCNC: 9 MMOL/L (ref 8–16)
APTT BLDCRRT: 30.8 SEC (ref 21–32)
AST SERPL-CCNC: 15 U/L (ref 10–40)
BASOPHILS # BLD AUTO: 0.03 K/UL (ref 0–0.2)
BASOPHILS NFR BLD: 0.4 % (ref 0–1.9)
BILIRUB SERPL-MCNC: 0.3 MG/DL (ref 0.1–1)
BUN SERPL-MCNC: 20 MG/DL (ref 8–23)
CALCIUM SERPL-MCNC: 9.2 MG/DL (ref 8.7–10.5)
CHLORIDE SERPL-SCNC: 114 MMOL/L (ref 95–110)
CK MB SERPL-MCNC: 1.1 NG/ML (ref 0.1–6.5)
CK MB SERPL-RTO: 1.5 % (ref 0–5)
CK SERPL-CCNC: 73 U/L (ref 20–180)
CO2 SERPL-SCNC: 20 MMOL/L (ref 23–29)
CREAT SERPL-MCNC: 0.9 MG/DL (ref 0.5–1.4)
DIASTOLIC DYSFUNCTION: YES
DIFFERENTIAL METHOD: ABNORMAL
EOSINOPHIL # BLD AUTO: 0.3 K/UL (ref 0–0.5)
EOSINOPHIL NFR BLD: 4 % (ref 0–8)
ERYTHROCYTE [DISTWIDTH] IN BLOOD BY AUTOMATED COUNT: 17.3 % (ref 11.5–14.5)
EST. GFR  (AFRICAN AMERICAN): >60 ML/MIN/1.73 M^2
EST. GFR  (NON AFRICAN AMERICAN): >60 ML/MIN/1.73 M^2
ESTIMATED PA SYSTOLIC PRESSURE: 35.95
GLUCOSE SERPL-MCNC: 106 MG/DL (ref 70–110)
HCT VFR BLD AUTO: 34 % (ref 37–48.5)
HGB BLD-MCNC: 10.5 G/DL (ref 12–16)
INR PPP: 1 (ref 0.8–1.2)
LYMPHOCYTES # BLD AUTO: 2.6 K/UL (ref 1–4.8)
LYMPHOCYTES NFR BLD: 34 % (ref 18–48)
MAGNESIUM SERPL-MCNC: 1.8 MG/DL (ref 1.6–2.6)
MCH RBC QN AUTO: 24 PG (ref 27–31)
MCHC RBC AUTO-ENTMCNC: 30.9 G/DL (ref 32–36)
MCV RBC AUTO: 78 FL (ref 82–98)
MONOCYTES # BLD AUTO: 0.5 K/UL (ref 0.3–1)
MONOCYTES NFR BLD: 6.9 % (ref 4–15)
NEUTROPHILS # BLD AUTO: 4.2 K/UL (ref 1.8–7.7)
NEUTROPHILS NFR BLD: 54.7 % (ref 38–73)
PHOSPHATE SERPL-MCNC: 3.9 MG/DL (ref 2.7–4.5)
PLATELET # BLD AUTO: 273 K/UL (ref 150–350)
PMV BLD AUTO: 9.3 FL (ref 9.2–12.9)
POTASSIUM SERPL-SCNC: 3.7 MMOL/L (ref 3.5–5.1)
PROT SERPL-MCNC: 6.5 G/DL (ref 6–8.4)
PROTHROMBIN TIME: 10.8 SEC (ref 9–12.5)
RBC # BLD AUTO: 4.38 M/UL (ref 4–5.4)
RETIRED EF AND QEF - SEE NOTES: 60 (ref 55–65)
SODIUM SERPL-SCNC: 143 MMOL/L (ref 136–145)
TRICUSPID VALVE REGURGITATION: ABNORMAL
WBC # BLD AUTO: 7.7 K/UL (ref 3.9–12.7)

## 2019-05-19 PROCEDURE — 85025 COMPLETE CBC W/AUTO DIFF WBC: CPT

## 2019-05-19 PROCEDURE — 85610 PROTHROMBIN TIME: CPT

## 2019-05-19 PROCEDURE — 25000003 PHARM REV CODE 250: Performed by: INTERNAL MEDICINE

## 2019-05-19 PROCEDURE — 80053 COMPREHEN METABOLIC PANEL: CPT

## 2019-05-19 PROCEDURE — 83735 ASSAY OF MAGNESIUM: CPT

## 2019-05-19 PROCEDURE — 92523 SPEECH SOUND LANG COMPREHEN: CPT

## 2019-05-19 PROCEDURE — 84100 ASSAY OF PHOSPHORUS: CPT

## 2019-05-19 PROCEDURE — 25000003 PHARM REV CODE 250: Performed by: NURSE PRACTITIONER

## 2019-05-19 PROCEDURE — A4216 STERILE WATER/SALINE, 10 ML: HCPCS | Performed by: NURSE PRACTITIONER

## 2019-05-19 PROCEDURE — 92610 EVALUATE SWALLOWING FUNCTION: CPT

## 2019-05-19 PROCEDURE — 97530 THERAPEUTIC ACTIVITIES: CPT

## 2019-05-19 PROCEDURE — 21400001 HC TELEMETRY ROOM

## 2019-05-19 PROCEDURE — 97162 PT EVAL MOD COMPLEX 30 MIN: CPT

## 2019-05-19 PROCEDURE — 63600175 PHARM REV CODE 636 W HCPCS: Performed by: NURSE PRACTITIONER

## 2019-05-19 PROCEDURE — 82550 ASSAY OF CK (CPK): CPT

## 2019-05-19 PROCEDURE — 96376 TX/PRO/DX INJ SAME DRUG ADON: CPT

## 2019-05-19 PROCEDURE — 63600175 PHARM REV CODE 636 W HCPCS: Performed by: INTERNAL MEDICINE

## 2019-05-19 PROCEDURE — 94761 N-INVAS EAR/PLS OXIMETRY MLT: CPT

## 2019-05-19 PROCEDURE — 85730 THROMBOPLASTIN TIME PARTIAL: CPT

## 2019-05-19 PROCEDURE — 36415 COLL VENOUS BLD VENIPUNCTURE: CPT

## 2019-05-19 PROCEDURE — 82553 CREATINE MB FRACTION: CPT

## 2019-05-19 RX ORDER — LOSARTAN POTASSIUM 50 MG/1
50 TABLET ORAL DAILY
Status: DISCONTINUED | OUTPATIENT
Start: 2019-05-20 | End: 2019-05-20 | Stop reason: HOSPADM

## 2019-05-19 RX ORDER — GABAPENTIN 300 MG/1
300 CAPSULE ORAL EVERY 8 HOURS
Status: DISCONTINUED | OUTPATIENT
Start: 2019-05-19 | End: 2019-05-20 | Stop reason: HOSPADM

## 2019-05-19 RX ORDER — ACETAMINOPHEN 325 MG/1
650 TABLET ORAL EVERY 6 HOURS PRN
Status: DISCONTINUED | OUTPATIENT
Start: 2019-05-19 | End: 2019-05-20 | Stop reason: HOSPADM

## 2019-05-19 RX ORDER — DIAZEPAM 5 MG/1
10 TABLET ORAL DAILY
Status: DISCONTINUED | OUTPATIENT
Start: 2019-05-19 | End: 2019-05-20 | Stop reason: HOSPADM

## 2019-05-19 RX ORDER — OXYCODONE AND ACETAMINOPHEN 7.5; 325 MG/1; MG/1
1 TABLET ORAL EVERY 6 HOURS PRN
Status: DISCONTINUED | OUTPATIENT
Start: 2019-05-19 | End: 2019-05-20 | Stop reason: HOSPADM

## 2019-05-19 RX ORDER — AMOXICILLIN 250 MG
2 CAPSULE ORAL NIGHTLY
Status: DISCONTINUED | OUTPATIENT
Start: 2019-05-19 | End: 2019-05-20 | Stop reason: HOSPADM

## 2019-05-19 RX ORDER — PROPRANOLOL HYDROCHLORIDE 10 MG/1
40 TABLET ORAL 2 TIMES DAILY
Status: DISCONTINUED | OUTPATIENT
Start: 2019-05-19 | End: 2019-05-20 | Stop reason: HOSPADM

## 2019-05-19 RX ORDER — IPRATROPIUM BROMIDE AND ALBUTEROL SULFATE 2.5; .5 MG/3ML; MG/3ML
3 SOLUTION RESPIRATORY (INHALATION) EVERY 6 HOURS PRN
Status: DISCONTINUED | OUTPATIENT
Start: 2019-05-19 | End: 2019-05-20 | Stop reason: HOSPADM

## 2019-05-19 RX ORDER — CLONIDINE HYDROCHLORIDE 0.1 MG/1
0.1 TABLET ORAL EVERY 8 HOURS
Status: DISCONTINUED | OUTPATIENT
Start: 2019-05-19 | End: 2019-05-20

## 2019-05-19 RX ORDER — HYDROCHLOROTHIAZIDE 12.5 MG/1
12.5 TABLET ORAL DAILY
Status: DISCONTINUED | OUTPATIENT
Start: 2019-05-19 | End: 2019-05-20 | Stop reason: HOSPADM

## 2019-05-19 RX ORDER — ANASTROZOLE 1 MG/1
1 TABLET ORAL DAILY
Status: DISCONTINUED | OUTPATIENT
Start: 2019-05-19 | End: 2019-05-20 | Stop reason: HOSPADM

## 2019-05-19 RX ORDER — BUTALBITAL, ACETAMINOPHEN AND CAFFEINE 50; 325; 40 MG/1; MG/1; MG/1
2 TABLET ORAL EVERY 8 HOURS PRN
Status: DISCONTINUED | OUTPATIENT
Start: 2019-05-19 | End: 2019-05-20 | Stop reason: HOSPADM

## 2019-05-19 RX ORDER — GABAPENTIN 300 MG/1
300 CAPSULE ORAL 3 TIMES DAILY
Status: DISCONTINUED | OUTPATIENT
Start: 2019-05-19 | End: 2019-05-19

## 2019-05-19 RX ORDER — POLYETHYLENE GLYCOL 3350 17 G/17G
17 POWDER, FOR SOLUTION ORAL 2 TIMES DAILY
Status: DISCONTINUED | OUTPATIENT
Start: 2019-05-19 | End: 2019-05-20 | Stop reason: HOSPADM

## 2019-05-19 RX ORDER — CLONIDINE HYDROCHLORIDE 0.1 MG/1
0.1 TABLET ORAL EVERY 8 HOURS PRN
Status: DISCONTINUED | OUTPATIENT
Start: 2019-05-19 | End: 2019-05-20 | Stop reason: HOSPADM

## 2019-05-19 RX ORDER — LOSARTAN POTASSIUM 50 MG/1
50 TABLET ORAL NIGHTLY
Status: DISCONTINUED | OUTPATIENT
Start: 2019-05-19 | End: 2019-05-19

## 2019-05-19 RX ADMIN — CLONIDINE HYDROCHLORIDE 0.1 MG: 0.1 TABLET ORAL at 06:05

## 2019-05-19 RX ADMIN — BUTALBITAL, ACETAMINOPHEN, AND CAFFEINE 2 TABLET: 50; 325; 40 TABLET ORAL at 04:05

## 2019-05-19 RX ADMIN — DIAZEPAM 10 MG: 5 TABLET ORAL at 09:05

## 2019-05-19 RX ADMIN — GABAPENTIN 300 MG: 300 CAPSULE ORAL at 02:05

## 2019-05-19 RX ADMIN — ASPIRIN 81 MG: 81 TABLET, COATED ORAL at 08:05

## 2019-05-19 RX ADMIN — GABAPENTIN 300 MG: 300 CAPSULE ORAL at 09:05

## 2019-05-19 RX ADMIN — GUAIFENESIN AND DEXTROMETHORPHAN HYDROBROMIDE 1 TABLET: 600; 30 TABLET, EXTENDED RELEASE ORAL at 09:05

## 2019-05-19 RX ADMIN — HYDROCHLOROTHIAZIDE 12.5 MG: 12.5 TABLET ORAL at 10:05

## 2019-05-19 RX ADMIN — KETOROLAC TROMETHAMINE 15 MG: 30 INJECTION, SOLUTION INTRAMUSCULAR at 02:05

## 2019-05-19 RX ADMIN — GUAIFENESIN AND DEXTROMETHORPHAN HYDROBROMIDE 1 TABLET: 600; 30 TABLET, EXTENDED RELEASE ORAL at 10:05

## 2019-05-19 RX ADMIN — Medication 3 ML: at 09:05

## 2019-05-19 RX ADMIN — ATORVASTATIN CALCIUM 40 MG: 40 TABLET, FILM COATED ORAL at 08:05

## 2019-05-19 RX ADMIN — ANASTROZOLE 1 MG: 1 TABLET ORAL at 11:05

## 2019-05-19 RX ADMIN — PROPRANOLOL HYDROCHLORIDE 40 MG: 10 TABLET ORAL at 09:05

## 2019-05-19 RX ADMIN — CLONIDINE HYDROCHLORIDE 0.1 MG: 0.1 TABLET ORAL at 08:05

## 2019-05-19 RX ADMIN — PROPRANOLOL HYDROCHLORIDE 40 MG: 10 TABLET ORAL at 10:05

## 2019-05-19 RX ADMIN — POLYETHYLENE GLYCOL 3350 17 G: 17 POWDER, FOR SOLUTION ORAL at 10:05

## 2019-05-19 RX ADMIN — HYDRALAZINE HYDROCHLORIDE 10 MG: 20 INJECTION INTRAMUSCULAR; INTRAVENOUS at 02:05

## 2019-05-19 RX ADMIN — POLYETHYLENE GLYCOL 3350 17 G: 17 POWDER, FOR SOLUTION ORAL at 09:05

## 2019-05-19 RX ADMIN — HYDRALAZINE HYDROCHLORIDE 10 MG: 20 INJECTION INTRAMUSCULAR; INTRAVENOUS at 07:05

## 2019-05-19 RX ADMIN — OXYCODONE HYDROCHLORIDE AND ACETAMINOPHEN 1 TABLET: 7.5; 325 TABLET ORAL at 10:05

## 2019-05-19 RX ADMIN — SENNOSIDES AND DOCUSATE SODIUM 2 TABLET: 8.6; 5 TABLET ORAL at 10:05

## 2019-05-19 NOTE — ASSESSMENT & PLAN NOTE
- Observation: neg CVA  - CT head with no acute intracranial findings  - MRI Negative CVA  - Carotid US hemodynamically significant stenosis  - 2D ECHO Dystolic dysfunction  - Lipid panel:   - A1C 5.9  - Neuro checks q 4 hours  -  will resume home medications: add HCTZ 12.5 mg and Propranolol 40 mg BID. Current systolic  mmHg  - No need to consult Neurology since MRI (-)   - PT/OT/SLP to evaluate and treat

## 2019-05-19 NOTE — SUBJECTIVE & OBJECTIVE
Interval History: B/P 200/75 will add HCTZ and propranolol 40mg BID    Review of Systems   Constitutional: Positive for activity change. Negative for appetite change, chills and fever.   HENT: Negative for trouble swallowing.    Eyes: Negative for visual disturbance.   Respiratory: Positive for cough (non-productive cough). Negative for apnea, choking, chest tightness, shortness of breath, wheezing and stridor.    Cardiovascular: Negative for chest pain, palpitations and leg swelling.   Gastrointestinal: Negative for abdominal pain, constipation, diarrhea, nausea and vomiting.   Genitourinary: Negative for decreased urine volume, difficulty urinating, dysuria, frequency and urgency.   Musculoskeletal: Negative for arthralgias, back pain, gait problem, joint swelling, myalgias, neck pain and neck stiffness.   Skin: Negative for color change.   Neurological: Positive for speech difficulty, weakness and headaches. Negative for dizziness, tremors, seizures, syncope, facial asymmetry, light-headedness and numbness.   Hematological: Does not bruise/bleed easily.   Psychiatric/Behavioral: Positive for dysphoric mood. Negative for agitation and behavioral problems.     ECHO:  CONCLUSIONS     1 - Mild left atrial enlargement.     2 - Concentric hypertrophy.     3 - No wall motion abnormalities.     4 - Normal left ventricular systolic function (EF 60-65%).     5 - Impaired LV relaxation, elevated LAP (grade 2 diastolic dysfunction).     6 - Normal right ventricular systolic function .     7 - The estimated PA systolic pressure is 36 mmHg.     8 - Mild tricuspid regurgitation.     Objective:     Vital Signs (Most Recent):  Temp: 97.4 °F (36.3 °C) (05/19/19 0745)  Pulse: 65 (05/19/19 1451)  Resp: 18 (05/19/19 1124)  BP: 136/78 (05/19/19 1451)  SpO2: 96 % (05/19/19 1124) Vital Signs (24h Range):  Temp:  [96.1 °F (35.6 °C)-97.9 °F (36.6 °C)] 97.4 °F (36.3 °C)  Pulse:  [63-86] 65  Resp:  [16-24] 18  SpO2:  [96 %-99 %] 96 %  BP:  (130-200)/(72-98) 136/78     Weight: (!) 159.5 kg (351 lb 10.1 oz)  Body mass index is 51.93 kg/m².    Intake/Output Summary (Last 24 hours) at 5/19/2019 1553  Last data filed at 5/19/2019 0500  Gross per 24 hour   Intake 800 ml   Output --   Net 800 ml      Physical Exam   Constitutional: She is oriented to person, place, and time. She appears well-developed and well-nourished. She is cooperative. She is easily aroused.   Obese, pleasant woman   HENT:   Head: Normocephalic and atraumatic.   Eyes: EOM are normal.   Neck: Normal range of motion. Neck supple.   Cardiovascular: Normal rate, regular rhythm and intact distal pulses.   No murmur heard.  Pulmonary/Chest: Effort normal and breath sounds normal. No stridor. No respiratory distress. She has no wheezes. She has no rales. She exhibits no tenderness.   Abdominal: Soft. Bowel sounds are normal. She exhibits no distension. There is no tenderness. There is no rebound and no guarding.   Genitourinary:   Genitourinary Comments: Deferred   Musculoskeletal: She exhibits no edema, tenderness or deformity.   Neurological: She is alert, oriented to person, place, and time and easily aroused. She has normal strength. No sensory deficit. GCS eye subscore is 4. GCS verbal subscore is 5. GCS motor subscore is 6.   Skin: Skin is warm and dry.   Psychiatric: Her behavior is normal.   Dysphoric Mood   Nursing note and vitals reviewed.    Significant Labs:   CBC:   Recent Labs   Lab 05/18/19  1100 05/19/19  0416   WBC 8.46 7.70   HGB 10.8* 10.5*   HCT 34.6* 34.0*    273     CMP:   Recent Labs   Lab 05/18/19  1100 05/19/19  0416    143   K 4.0 3.7   * 114*   CO2 22* 20*   GLU 91 106   BUN 20 20   CREATININE 1.2 0.9   CALCIUM 9.4 9.2   PROT 6.9 6.5   ALBUMIN 2.9* 2.7*   BILITOT 0.2 0.3   ALKPHOS 129 123   AST 19 15   ALT 26 21   ANIONGAP 8 9   EGFRNONAA 47* >60       Significant Imaging: I have reviewed all pertinent imaging results/findings within the past 24  hours.   Imaging Results          US Carotid Bilateral (Final result)  Result time 05/18/19 15:26:25    Final result by Rosalio Stallworth Jr., MD (05/18/19 15:26:25)                 Impression:      No hemodynamically significant stenosis.      Electronically signed by: Rosalio Stallworth MD  Date:    05/18/2019  Time:    15:26             Narrative:    EXAMINATION:  US CAROTID BILATERAL    CLINICAL HISTORY:  expressive aphasia;    COMPARISON:  None    FINDINGS:  Right side: No significant atheromatous disease. No hemodynamically significant stenosis.  Peak systolic velocity in the right ICA is 90 cm/s.  Systolic velocity ratio is 1.7.  Antegrade flow in the right vertebral artery.    Left side: No significant atheromatous disease. No hemodynamically significant stenosis.  Peak systolic velocity in the left ICA is 60 cm/s.  Systolic velocity ratio is 1.6.  Antegrade flow in the left vertebral artery.    Stenosis of  % - validated velocity measurements with angiographic measurements, velocity criteria are extrapolated from diameter data as defined by the Society of Radiologists in Ultrasound Consensus Conference Radiology 2003; 229;340-346.                               MRI BRAIN WITHOUT CONTRAST (Final result)  Result time 05/18/19 14:30:05    Final result by Rosalio Stallworth Jr., MD (05/18/19 14:30:05)                 Impression:      No acute abnormality      Electronically signed by: Rosalio Stallworth MD  Date:    05/18/2019  Time:    14:30             Narrative:    EXAMINATION:  MRI BRAIN WITHOUT CONTRAST    CLINICAL HISTORY:  Stroke;.    TECHNIQUE:  Multiplanar multisequence MR imaging of the brain was performed without contrast.    COMPARISON:  CT 05/18/2019    FINDINGS:  Ventricular system is normal.  No abnormal signal change to indicate acute major vascular distribution ischemic infarction.  No hemorrhage.  No signal changes to indicate acute ischemic lacunar infarcts.  No involutional changes.  No advanced  microvascular ischemic change.  Cerebellar hemispheres and brainstem appear unremarkable.  Major intracranial arterial structures demonstrate appropriate flow void signal.  No sellar or suprasellar mass.  Craniocervical junction and upper cervical spinal cord appear unremarkable.    Visualized sinuses appear clear.  Mild amount of fluid in the left mastoid air cells.                               CT Head Without Contrast (Final result)  Result time 05/18/19 12:40:20    Final result by Rosalio Stallworth Jr., MD (05/18/19 12:40:20)                 Impression:      No acute intracranial findings evident.    All CT scans at this facility are performed  using dose modulation techniques as appropriate to performed exam including the following:  automated exposure control; adjustment of mA and/or kV according to the patients size (this includes techniques or standardized protocols for targeted exams where dose is matched to indication/reason for exam: i.e. extremities or head);  iterative reconstruction technique.      Electronically signed by: Rosalio Stallworth MD  Date:    05/18/2019  Time:    12:40             Narrative:    EXAMINATION:  CT HEAD WITHOUT CONTRAST    CLINICAL HISTORY:  Headache, chronic, new features or neuro deficit;    TECHNIQUE:  Noncontrast axial images of the head were obtained.    COMPARISON:  No comparison studies are available.    FINDINGS:  Ventricular system is normal.  No hydrocephalus.  No midline shift.  No abnormal density to indicate acute major vascular distribution ischemic infarction or hemorrhage.  No mass effect.  No extra-axial fluid collections.    Visualized paranasal sinuses and mastoid air cells appear clear.    No calvarial fracture.                               X-Ray Chest AP Portable (Final result)  Result time 05/18/19 11:16:50    Final result by Rosalio Stallworth Jr., MD (05/18/19 11:16:50)                 Impression:      No acute findings.      Electronically signed  by: Gricel Stallworth MD  Date:    05/18/2019  Time:    11:16             Narrative:    EXAMINATION:  XR CHEST AP PORTABLE    CLINICAL HISTORY:  HTN;    COMPARISON:  05/06/2019    FINDINGS:  Heart size is at the upper limits of normal.  Shallow degree of inspiration..  Lungs appear clear of active disease.  No infiltrates or effusions.  No suspicious mass.

## 2019-05-19 NOTE — HOSPITAL COURSE
CT head no acute findins. MRI negative for CVA. U/s no hemodynamically stenosis. Chest Xray negative for infiltrates. She admits to occasionally missing clonidine and she has gained quite a bit of weight over the past 2 years. B/P has remained high (180-200) with additional losartan for a total of 150 mg in 24 hours (takes 50 mg at home in the am). Will add HCTZ 12.5 mg and Propranolol 40 mg BID which will help B/P and HA. Patient stated Dr. Huynh, oncologist, consulted Dr. Arreguin for radiation and anastrozole 1 mg daily for 5 years. Patient stated Dr. Arreguin said her B/P was TOO high for radiation. Will recheck B/P later today. Patient BP improved with therapy and scheduled clonidine. Patient ruled out for acute CVA. Patient seen and examined today, appropriate for d/c. Patient to follow with Dr Arreguin for Rad Onc as scheduled today.

## 2019-05-19 NOTE — ASSESSMENT & PLAN NOTE
- resume home medications including Losartan 50mg and Clonidine 0.1 TID  -add HCTZ 12.5 mg  -Add propranolol 40 mg BID

## 2019-05-19 NOTE — PT/OT/SLP EVAL
Physical Therapy Evaluation    Patient Name:  Susu Luther   MRN:  [id number]    Recommendations:     Discharge Recommendations:  home   Discharge Equipment Recommendations: none   Barriers to discharge: None    Assessment:     Susu Luther is a 69 y.o. female admitted with a medical diagnosis of Expressive aphasia.  She presents with the following impairments/functional limitations:  impaired endurance, gait instability, impaired balance, impaired cardiopulmonary response to activity, impaired functional mobilty, weakness, impaired self care skills.    Rehab Prognosis: Good; patient would benefit from acute skilled PT services to address these deficits and reach maximum level of function.    Recent Surgery: * No surgery found *      Plan:     During this hospitalization, patient to be seen 5 x/week to address the identified rehab impairments via gait training, therapeutic activities, therapeutic exercises and progress toward the following goals:    · Plan of Care Expires:  05/26/19    Subjective     Chief Complaint: weakness; sob  Patient/Family Comments/goals: to go home stronger and back to indep prior level of function  Pain/Comfort:  · Pain Rating 1: 0/10    Patients cultural, spiritual, Spiritism conflicts given the current situation:      Living Environment:  Lives with ; no stairs to enter home  Prior to admission, patients level of function was indep/working.  Equipment used at home:  .  DME owned (not currently used): rolling walker and quad cane.  Upon discharge, patient will have assistance from family.    Objective:     Communicated with rN Paget prior to session.  Patient found HOB elevated with telemetry  upon PT entry to room.    General Precautions: Standard, fall   Orthopedic Precautions:N/A   Braces: N/A     Exams:  · B LE strength grossly 4/5 at least and rom wfl    Functional Mobility:  · Bed Mobility - sup  · Transfers - cga for safety; min unsteadiness  · Gt - Amb 50 ft in  room r/t min sob on RA/dizziness - min/cga handheld A of PT      Therapeutic Activities and Exercises:   PT educated patient on breathing techs to manage/dec min sob, le rom to do in bed, role of PT, POC and safety/fall precautions with mobility    AM-PAC 6 CLICK MOBILITY  Total Score:18     Patient left HOB elevated with all lines intact, call button in reach and RN notified.    GOALS:   Multidisciplinary Problems     Physical Therapy Goals        Problem: Physical Therapy Goal    Goal Priority Disciplines Outcome Goal Variances Interventions   Physical Therapy Goal     PT, PT/OT      Description:  1. Patient will perform sit to stand indep  2. Patient will amb 300ft sin AD sup no gross LOB  3. Patient will perform B LE stand TE x 10 reps                    History:     Past Medical History:   Diagnosis Date    Encounter for blood transfusion     Hypertension     Malignant neoplasm of upper-outer quadrant of right breast in female, estrogen receptor positive 3/14/2019       Past Surgical History:   Procedure Laterality Date    APPENDECTOMY      BIOPSY, LYMPH NODE, SENTINEL Right 3/27/2019    [facility]     SECTION      x 1    LUMPECTOMY, BREAST Right 3/27/2019    [facility] at [facility] OR    OOPHORECTOMY      right        Time Tracking:     PT Received On: 19  PT Start Time: 1050     PT Stop Time: 1115  PT Total Time (min): 25 min     Billable Minutes: Evaluation 15 and Therapeutic Activity 10      Naun Leblanc, PT  2019

## 2019-05-19 NOTE — PROGRESS NOTES
Have reassessed patient frequently. Headache pain has gone down to 8/10. Patient speech back to baseline. Hospital medicine reordered home med of Fiorcet which was administered per order. Patient Neuro check WNL. Will continue to monitor patient.

## 2019-05-19 NOTE — PLAN OF CARE
Pt AAOx4. Some delayed response to questions. Speech clear and intelligable. POC discussed w/patient, verbalized understanding. NSR on monitor. VSS. Voids per BRP with standby assist. Ambulation steady, slowly without device(s). Patient turns independently in bed. Fall precautions in place, bed alarm on, bed in lowest, call light and personal items within reach. SKIN: patient undergoing radiation therapy to right breast. Applying home medication (miaderm) to right breast TID. ROM: RUE limited ROM. Encourage aROM. Patient not tolerant.    One episode of intense headache reminiscent of at-home symptoms relieved after hydralazine, toradol, and fiorcet.     Patient rest in bed with chest rising and falling evenly.

## 2019-05-19 NOTE — PROGRESS NOTES
Observed patient in notable distressed. RR: 22, grimacing with difficulty expressing the pain she was in. Patient reported intense headache like the one she has at home. Ellen BERTRAND assisted me with patient.    Assessed vitals and delivered PRN hydralazine and Toradol to relieve symptoms after observing neg. FACE results to r/o poss.CVA. Patient non-compliant with some requests. Stated blurred vision then grabbed her pitcher of water and drank fluids. Before leaving room the patient was verbalizing more words and stated her blurred vision was already beginning to resolve. Will reassess vitals on patient.    Shared findings with hospital medicine.   New Orders: If medications administered do NOT relieve symptoms then sent for CT. Monitor patient closely.

## 2019-05-19 NOTE — PROGRESS NOTES
11:30 am - Patient seen and examined. B/P this am 200/75 and was given HCTZ 12.5 and propranolol 40 mg BID. B/P at noon 131/76. SB/P Goal: 150's given her home B/P's generally 200's. Speech therapy stated no need for further follow-up. Dietary counseling for Weight loss, 2 gm Na+.     14:00 - patient seen and examined. B/P 136/78. Reported feeling light headed. B/P normally runs 200 at home. Will hold 2pm clonidine (parameters added holding for B/P less than 155).  ppt

## 2019-05-19 NOTE — PT/OT/SLP PROGRESS
Physical Therapy      Patient Name:  Susu Luther   MRN:  [id number]    Eval initiated per chart review in [facility] but patient requested to wait for eval until tomorrow r/t inc HA pain. Will follow-up on next session.    Naun Leblanc, PT

## 2019-05-19 NOTE — PT/OT/SLP EVAL
Speech Language Pathology Evaluation  Cognitive/Bedside Swallow    Patient Name:  Susu Luther   MRN:  [id number]  Admitting Diagnosis: Expressive aphasia    Recommendations:                  General Recommendations:  Follow-up not indicated  Diet recommendations:  Regular, Thin   Aspiration Precautions: Standard aspiration precautions   General Precautions: Standard, fall  Communication strategies:  none    History:     Past Medical History:   Diagnosis Date    Encounter for blood transfusion     Hypertension     Malignant neoplasm of upper-outer quadrant of right breast in female, estrogen receptor positive 3/14/2019       Past Surgical History:   Procedure Laterality Date    APPENDECTOMY      BIOPSY, LYMPH NODE, SENTINEL Right 3/27/2019    [facility] OR     SECTION      x 1    LUMPECTOMY, BREAST Right 3/27/2019    Per[facility] at [facility] OR    OOPHORECTOMY      right        Social History: Patient lives with home with spouse.    Subjective     Pt alert, cooperative, and seen at bedside. Pt denied any difficulty coughing and/or choking during po intake.    Pain/Comfort:  · Pain Rating 1: 0/10  · Pain Rating Post-Intervention 1: 0/10    Objective:     Areas of speech, language, cognition, and swallowing appear WFL. Pt tolerated bedside trials of thin liquids via straw and medications whole without any overt s/s of aspiration. No further therapy indicated at this time.    Assessment:     Goals:   Multidisciplinary Problems     SLP Goals        Problem: SLP Goal    Goal Priority Disciplines Outcome   SLP Goal     SLP                    Plan:     · Patient to be seen:      · Plan of Care expires:     · Plan of Care reviewed with:  patient   · SLP Follow-Up:  No       Discharge recommendations:  Discharge Facility/Level of Care Needs: home   Barriers to Discharge:  None    Time Tracking:     SLP Treatment Date:   19  Speech Start Time:   0958  Speech Stop Time:  1014     Speech Total Time (min):  16 min    Billable Minutes: Eval 16     Delma Luna CCC-SLP  05/19/2019

## 2019-05-19 NOTE — PLAN OF CARE
Sw met with patient and her sister Francisac at bedside to complete assessment. Pt reports she lives with her spouse. Pt reports she will need a rollator and home health at discharge. Pt reports she receives radiation weekly. Patient denies any post hospital needs or services at this time. Transitional Care Folder, Discharge Planning Begins on Admission pamphlet, [facility] Bedside Delivery pamphlet, Advance Directive information given to patient along with the contact information given. Sw placed contact information on white board. Sw instructed patient or family to call with any questions or concerns.     Pt's pcp is Tomasz Cage MD. Pt uses   [facility] [address] [facility]  [address] [place] [id number]  Phone: [phone] Fax: [phone]    [facility] [address]  [facility] [address]  Phone: [phone] Fax: [phone]       05/19/19 1440   Discharge Assessment   Assessment Type Discharge Planning Assessment   Confirmed/corrected address and phone number on facesheet? Yes   Assessment information obtained from? Patient   Communicated expected length of stay with patient/caregiver yes   Prior to hospitilization cognitive status: Alert/Oriented   Prior to hospitalization functional status: Independent   Current cognitive status: Alert/Oriented   Current Functional Status: Independent   Facility Arrived From: Home   Lives With spouse   Able to Return to Prior Arrangements yes   Is patient able to care for self after discharge? Yes   Who are your caregiver(s) and their phone number(s)? Campos Luther, spouse, [phone]   Patient's perception of discharge disposition home or selfcare   Readmission Within the Last 30 Days no previous admission in last 30 days   Patient currently being followed by outpatient case management? No   Patient currently receives any other outside agency services? No   Equipment Currently Used at Home cane, quad;walker,  rolling   Do you have any problems affording any of your prescribed medications? TBD   Is the patient taking medications as prescribed? yes   Does the patient have transportation home? Yes   Transportation Anticipated family or friend will provide   Does the patient receive services at the [facility]? No   Discharge Plan A Home with family   Discharge Plan B Home Health   DME Needed Upon Discharge  rollator   Patient/Family in Agreement with Plan yes

## 2019-05-19 NOTE — ASSESSMENT & PLAN NOTE
- Followed by Dr. Huynh and Dr. Arreguin   - S/p lumpectomy and sentinel node biopsy on 03/27/19  - Pt reports she has been unable to start radiation to due Severely elevated BP   - F/U with Dr. Huynh and Dr. Arreguin upon discharge

## 2019-05-19 NOTE — PROGRESS NOTES
"Patient called for assistance. Visually noted to be in distress and expresses intense pain to head "like when at home". States vision is blurry. Vitals taken show BP: 198/93 HR: 79. Observed mild left face droop. Non-compliant when assessing FACE to r/t CVA. Administered Hydralazine and toradol PRN per MD orders. Informed Hospital Medicine and Charge Nurse of patient status.  "

## 2019-05-19 NOTE — PLAN OF CARE
Problem: Adult Inpatient Plan of Care  Goal: Plan of Care Review  Outcome: Ongoing (interventions implemented as appropriate)  POC reviewed with pt verbalized understanding  Pt remained free from fall, precautions in place  Pt is NSR on monitor 69-75  VS monitored  Pt able to turn self and ambulated down tyson with PT  IV intact   Not other complaints at this time. Call bell in belongings in reach, hourly rounding complete, reminded to call for assist will continue monitor.

## 2019-05-20 VITALS
BODY MASS INDEX: 43.4 KG/M2 | HEART RATE: 61 BPM | OXYGEN SATURATION: 99 % | TEMPERATURE: 98 F | DIASTOLIC BLOOD PRESSURE: 74 MMHG | HEIGHT: 69 IN | RESPIRATION RATE: 18 BRPM | SYSTOLIC BLOOD PRESSURE: 138 MMHG | WEIGHT: 293 LBS

## 2019-05-20 PROBLEM — R47.01 EXPRESSIVE APHASIA: Status: RESOLVED | Noted: 2019-05-18 | Resolved: 2019-05-20

## 2019-05-20 PROBLEM — I16.0 HYPERTENSIVE URGENCY, MALIGNANT: Status: RESOLVED | Noted: 2019-02-28 | Resolved: 2019-05-20

## 2019-05-20 LAB
ALBUMIN SERPL BCP-MCNC: 2.7 G/DL (ref 3.5–5.2)
ALP SERPL-CCNC: 121 U/L (ref 55–135)
ALT SERPL W/O P-5'-P-CCNC: 22 U/L (ref 10–44)
ANION GAP SERPL CALC-SCNC: 7 MMOL/L (ref 8–16)
AST SERPL-CCNC: 15 U/L (ref 10–40)
BASOPHILS # BLD AUTO: 0.04 K/UL (ref 0–0.2)
BASOPHILS NFR BLD: 0.4 % (ref 0–1.9)
BILIRUB SERPL-MCNC: 0.2 MG/DL (ref 0.1–1)
BUN SERPL-MCNC: 19 MG/DL (ref 8–23)
CALCIUM SERPL-MCNC: 9.3 MG/DL (ref 8.7–10.5)
CHLORIDE SERPL-SCNC: 113 MMOL/L (ref 95–110)
CO2 SERPL-SCNC: 22 MMOL/L (ref 23–29)
CREAT SERPL-MCNC: 1.1 MG/DL (ref 0.5–1.4)
DIFFERENTIAL METHOD: ABNORMAL
EOSINOPHIL # BLD AUTO: 0.3 K/UL (ref 0–0.5)
EOSINOPHIL NFR BLD: 3.3 % (ref 0–8)
ERYTHROCYTE [DISTWIDTH] IN BLOOD BY AUTOMATED COUNT: 17.4 % (ref 11.5–14.5)
EST. GFR  (AFRICAN AMERICAN): >60 ML/MIN/1.73 M^2
EST. GFR  (NON AFRICAN AMERICAN): 52 ML/MIN/1.73 M^2
GLUCOSE SERPL-MCNC: 103 MG/DL (ref 70–110)
HCT VFR BLD AUTO: 35.4 % (ref 37–48.5)
HGB BLD-MCNC: 10.8 G/DL (ref 12–16)
LYMPHOCYTES # BLD AUTO: 2.4 K/UL (ref 1–4.8)
LYMPHOCYTES NFR BLD: 26 % (ref 18–48)
MCH RBC QN AUTO: 23.8 PG (ref 27–31)
MCHC RBC AUTO-ENTMCNC: 30.5 G/DL (ref 32–36)
MCV RBC AUTO: 78 FL (ref 82–98)
MONOCYTES # BLD AUTO: 0.8 K/UL (ref 0.3–1)
MONOCYTES NFR BLD: 8.8 % (ref 4–15)
NEUTROPHILS # BLD AUTO: 5.6 K/UL (ref 1.8–7.7)
NEUTROPHILS NFR BLD: 61.5 % (ref 38–73)
PLATELET # BLD AUTO: 290 K/UL (ref 150–350)
PMV BLD AUTO: 9.3 FL (ref 9.2–12.9)
POTASSIUM SERPL-SCNC: 4.4 MMOL/L (ref 3.5–5.1)
PROT SERPL-MCNC: 6.5 G/DL (ref 6–8.4)
RBC # BLD AUTO: 4.53 M/UL (ref 4–5.4)
SODIUM SERPL-SCNC: 142 MMOL/L (ref 136–145)
WBC # BLD AUTO: 9.13 K/UL (ref 3.9–12.7)

## 2019-05-20 PROCEDURE — 99223 1ST HOSP IP/OBS HIGH 75: CPT | Mod: ,,, | Performed by: RADIOLOGY

## 2019-05-20 PROCEDURE — 94799 UNLISTED PULMONARY SVC/PX: CPT

## 2019-05-20 PROCEDURE — 36415 COLL VENOUS BLD VENIPUNCTURE: CPT

## 2019-05-20 PROCEDURE — 97165 OT EVAL LOW COMPLEX 30 MIN: CPT | Performed by: PHYSICAL THERAPIST

## 2019-05-20 PROCEDURE — A4216 STERILE WATER/SALINE, 10 ML: HCPCS | Performed by: NURSE PRACTITIONER

## 2019-05-20 PROCEDURE — 77412 RADIATION TX DELIVERY LVL 3: CPT | Performed by: RADIOLOGY

## 2019-05-20 PROCEDURE — 25000003 PHARM REV CODE 250: Performed by: INTERNAL MEDICINE

## 2019-05-20 PROCEDURE — 97530 THERAPEUTIC ACTIVITIES: CPT | Performed by: PHYSICAL THERAPIST

## 2019-05-20 PROCEDURE — 80053 COMPREHEN METABOLIC PANEL: CPT

## 2019-05-20 PROCEDURE — 97802 MEDICAL NUTRITION INDIV IN: CPT

## 2019-05-20 PROCEDURE — 77387 PR GUIDANCE FOR RADIATION TREATMENT DELIVERY: ICD-10-PCS | Mod: 26,,, | Performed by: RADIOLOGY

## 2019-05-20 PROCEDURE — 97116 GAIT TRAINING THERAPY: CPT

## 2019-05-20 PROCEDURE — 27000221 HC OXYGEN, UP TO 24 HOURS

## 2019-05-20 PROCEDURE — 99223 PR INITIAL HOSPITAL CARE,LEVL III: ICD-10-PCS | Mod: ,,, | Performed by: RADIOLOGY

## 2019-05-20 PROCEDURE — 77387 GUIDANCE FOR RADJ TX DLVR: CPT | Mod: TC | Performed by: RADIOLOGY

## 2019-05-20 PROCEDURE — 77387 GUIDANCE FOR RADJ TX DLVR: CPT | Mod: 26,,, | Performed by: RADIOLOGY

## 2019-05-20 PROCEDURE — 85025 COMPLETE CBC W/AUTO DIFF WBC: CPT

## 2019-05-20 PROCEDURE — 25000003 PHARM REV CODE 250: Performed by: NURSE PRACTITIONER

## 2019-05-20 PROCEDURE — 63600175 PHARM REV CODE 636 W HCPCS: Performed by: INTERNAL MEDICINE

## 2019-05-20 PROCEDURE — 97110 THERAPEUTIC EXERCISES: CPT

## 2019-05-20 PROCEDURE — 94761 N-INVAS EAR/PLS OXIMETRY MLT: CPT

## 2019-05-20 RX ORDER — CLONIDINE HYDROCHLORIDE 0.1 MG/1
0.1 TABLET ORAL EVERY 8 HOURS
Status: DISCONTINUED | OUTPATIENT
Start: 2019-05-20 | End: 2019-05-20 | Stop reason: HOSPADM

## 2019-05-20 RX ORDER — ASPIRIN 81 MG/1
81 TABLET ORAL DAILY
Refills: 0 | COMMUNITY
Start: 2019-05-21 | End: 2020-11-05 | Stop reason: SDUPTHER

## 2019-05-20 RX ORDER — ATORVASTATIN CALCIUM 40 MG/1
40 TABLET, FILM COATED ORAL DAILY
Qty: 90 TABLET | Refills: 3 | Status: SHIPPED | OUTPATIENT
Start: 2019-05-21 | End: 2020-10-28 | Stop reason: SDUPTHER

## 2019-05-20 RX ORDER — PROPRANOLOL HYDROCHLORIDE 40 MG/1
40 TABLET ORAL 2 TIMES DAILY
Qty: 60 TABLET | Refills: 11 | Status: SHIPPED | OUTPATIENT
Start: 2019-05-20 | End: 2019-12-16 | Stop reason: SDUPTHER

## 2019-05-20 RX ORDER — HYDROCHLOROTHIAZIDE 12.5 MG/1
12.5 CAPSULE ORAL DAILY
Qty: 30 CAPSULE | Refills: 8 | Status: SHIPPED | OUTPATIENT
Start: 2019-05-20 | End: 2019-10-24

## 2019-05-20 RX ORDER — LOSARTAN POTASSIUM 50 MG/1
50 TABLET ORAL DAILY
Qty: 90 TABLET | Refills: 3 | Status: SHIPPED | OUTPATIENT
Start: 2019-05-21 | End: 2019-11-11 | Stop reason: SDUPTHER

## 2019-05-20 RX ADMIN — BUTALBITAL, ACETAMINOPHEN, AND CAFFEINE 2 TABLET: 50; 325; 40 TABLET ORAL at 08:05

## 2019-05-20 RX ADMIN — ANASTROZOLE 1 MG: 1 TABLET ORAL at 08:05

## 2019-05-20 RX ADMIN — PROPRANOLOL HYDROCHLORIDE 40 MG: 10 TABLET ORAL at 08:05

## 2019-05-20 RX ADMIN — LOSARTAN POTASSIUM 50 MG: 50 TABLET, FILM COATED ORAL at 08:05

## 2019-05-20 RX ADMIN — GABAPENTIN 300 MG: 300 CAPSULE ORAL at 06:05

## 2019-05-20 RX ADMIN — Medication 3 ML: at 06:05

## 2019-05-20 RX ADMIN — KETOROLAC TROMETHAMINE 15 MG: 30 INJECTION, SOLUTION INTRAMUSCULAR at 01:05

## 2019-05-20 RX ADMIN — DIAZEPAM 10 MG: 5 TABLET ORAL at 08:05

## 2019-05-20 RX ADMIN — POLYETHYLENE GLYCOL 3350 17 G: 17 POWDER, FOR SOLUTION ORAL at 08:05

## 2019-05-20 RX ADMIN — CLONIDINE HYDROCHLORIDE 0.1 MG: 0.1 TABLET ORAL at 08:05

## 2019-05-20 RX ADMIN — GUAIFENESIN AND DEXTROMETHORPHAN HYDROBROMIDE 1 TABLET: 600; 30 TABLET, EXTENDED RELEASE ORAL at 08:05

## 2019-05-20 RX ADMIN — ATORVASTATIN CALCIUM 40 MG: 40 TABLET, FILM COATED ORAL at 08:05

## 2019-05-20 RX ADMIN — ASPIRIN 81 MG: 81 TABLET, COATED ORAL at 08:05

## 2019-05-20 RX ADMIN — HYDROCHLOROTHIAZIDE 12.5 MG: 12.5 TABLET ORAL at 08:05

## 2019-05-20 NOTE — PLAN OF CARE
May24 Established Patient Visit with Kennedy Rojas MD   Friday May 24, 2019 10:40 AM   Arrive at check-in approximately 15 minutes before your scheduled appointment time. Bring all outside medical records and imaging, along with a list of your current medications and insurance card.  2nd Floor - From [address], take the [facility] exit ([address]). Enter the facility from the [address]  [facility]   [address] [facility] [address]   [phone]         05/20/19 1526   Final Note   Assessment Type Final Discharge Note   Anticipated Discharge Disposition Home-Health   Hospital Follow Up  Appt(s) scheduled? Yes   Right Care Referral Info   Post Acute Recommendation Home-care   Facility Name [facility]

## 2019-05-20 NOTE — PLAN OF CARE
Problem: Physical Therapy Goal  Goal: Physical Therapy Goal  1. Patient will perform sit to stand indep  2. Patient will amb 300ft sin AD sup no gross LOB  3. Patient will perform B LE stand TE x 10 reps   Outcome: Ongoing (interventions implemented as appropriate)  PATIENT DID WELL WITH OOB T/FS AT CGAX1, GT INTO HALLWAY 50'X2 , GOOD STEADY PACE WITH RW AT CGAX1, CUES FOR BODY ALIGNMENT WITH AD.

## 2019-05-20 NOTE — PLAN OF CARE
05/20/19 1014   Medicare Message   Important Message from Medicare regarding Discharge Appeal Rights Given to patient/caregiver;Explained to patient/caregiver;Signed/date by patient/caregiver   Date IMM was signed 05/20/19   Time IMM was signed 1005

## 2019-05-20 NOTE — CONSULTS
[facility]  RADIATION ONCOLOGY CONSULTATION    Name: Susu Luther  : 1950      Patient Referred To Radiation Oncology By:  Dr. Waters Self  No address on file    DIAGNOSIS:  Right breast lower outer quadrant invasive lobular carcinoma pIIA: pT2 pN0(sn) cM0, ER positive, GA negative, her 2 Steff negative  1. 19 had a bilateral screening mammogram showing an irregular spiculated mass lower outer right breast middle depth.    2. 19 diagnostic mammogram confirmed the mass.  Ultrasound showed 2 enlarged right axillary lymph nodes and a lower outer quadrant right breast mass measuring 3.8 cm maximum dimension at 8:00 position.    3. 3/4/19 right breast biopsy and clip and right lymph node biopsy and clip were performed.  The axillary clip was not visualized on post biopsy imaging due to possible posterior depth of the node. The breast clip was in the expected position.  The right breast returned invasive lobular carcinoma, grade 2 with no lymphovascular or perineural invasion, ER positive 95%, GA negative, her 2 2+, FISH non-amplified.  The lymph nodes were benign.    4. 3/27/19 patient had a lumpectomy and sentinel lymph node biopsy.  Gross analysis of the lumpectomy was concerning for positive margins of the medial, superior and inferior aspect therefore additional margins were taken.  The final deep margin was the pectoralis fascia.  The lumpectomy cavity was marked with 6 Ligaclips.  Pathology contained 4 benign sentinel lymph nodes and 4 benign non sentinel lymph nodes.  The lumpectomy contained invasive lobular carcinoma measuring 4 cm maximum dimension, grade 2.  The main lumpectomy had positive margins superior, deep and medial and negative margins inferior, lateral and anterior.  The additionally submitted margins had carcinoma 4 mm from the new margin.  Final stage pT2 pN0(sn) cM0.  Oncotype DX 22.    HISTORY OF PRESENT ILLNESS:  Susu Luther is a  pleasant 69 y.o. female who has been scheduled to receive radiation to the right breast.  I sent her to the ER on May 6 for hypertension she was discharged.  She presented 2 days ago to the ER with hypertension and blurred vision, headache and slurred speech for 1 week.  Carotid ultrasound, CT brain without contrast, brain MRI and chest x-ray were negative.  She remains an inpatient.  Her blood pressure remains high.  Neurology is being consulted.    REVIEW OF SYSTEMS: (Positive findings bold, otherwise negative)   Constitutional: fever, fatigue, weight change  Eyes: blurred vision in the past 3 months, double vision   ENT: ear pain, new mouth lesions, jaw pain, difficulty swallowing, sore throat  Cardiovascular: chest pain on exertion, reflux, extremity swelling  Respiratory: shortness of breath, dyspnea, cough, hemoptysis.   GI: abdominal pain, diarrhea, constipation, blood in stool, painful bowel movements  : painful or burning urination, denies blood in urine  Musculoskeletal: new bone or joint pains  Neurologic: headache, seizure, focal numbness or tingling, balance changes, speech changes  Lymph: new or enlarged lymph nodes  Psychiatric: depression, anxiety    PRIOR RADIATION HISTORY: None    PAST MEDICAL HISTORY:  Past Medical History:   Diagnosis Date    Encounter for blood transfusion     Hypertension     Malignant neoplasm of upper-outer quadrant of right breast in female, estrogen receptor positive 3/14/2019       PAST SURGICAL HISTORY:  Past Surgical History:   Procedure Laterality Date    APPENDECTOMY      BIOPSY, LYMPH NODE, SENTINEL Right 3/27/2019    [facility] OR     SECTION      x 1    LUMPECTOMY, BREAST Right 3/27/2019    [facility] at [facility] OR    OOPHORECTOMY      right        ALLERGIES:   Review of patient's allergies indicates:   Allergen Reactions    Pcn [penicillins] Rash       MEDICATIONS:    Current  Facility-Administered Medications:     acetaminophen tablet 650 mg, 650 mg, Oral, Q6H PRN, Leticia Yun NP    albuterol-ipratropium 2.5 mg-0.5 mg/3 mL nebulizer solution 3 mL, 3 mL, Nebulization, Q6H PRN, Gregorio Plunkett NP    anastrozole tablet 1 mg, 1 mg, Oral, Daily, Mary P. Jose, NP, 1 mg at 05/20/19 0815    aspirin EC tablet 81 mg, 81 mg, Oral, Daily, JACKELINE Montgomery, 81 mg at 05/20/19 0814    atorvastatin tablet 40 mg, 40 mg, Oral, Daily, JACKELINE Montgomery, 40 mg at 05/20/19 0814    butalbital-acetaminophen-caffeine -40 mg per tablet 2 tablet, 2 tablet, Oral, Q8H PRN, Zeb Watson MD, 2 tablet at 05/20/19 0824    cloNIDine tablet 0.1 mg, 0.1 mg, Oral, Q8H PRN, Gregorio Plunkett NP    cloNIDine tablet 0.1 mg, 0.1 mg, Oral, Q8H, Faizan Harris MD, 0.1 mg at 05/20/19 0855    dextromethorphan-guaifenesin  mg per 12 hr tablet 1 tablet, 1 tablet, Oral, Q12H, Mary P. Jose, NP, 1 tablet at 05/20/19 0814    diazePAM tablet 10 mg, 10 mg, Oral, Daily, Mary P. Jose, NP, 10 mg at 05/20/19 0815    diphenhydrAMINE capsule 25 mg, 25 mg, Oral, Q6H PRN, JACKELINE Montgomery    emollient combination no.63 Lotn 1 application, 1 application, Topical (Top), TID, Zeb Watson MD, 1 application at 05/20/19 0825    gabapentin capsule 300 mg, 300 mg, Oral, Q8H, Mary P. Jose, NP, 300 mg at 05/20/19 0630    hydroCHLOROthiazide tablet 12.5 mg, 12.5 mg, Oral, Daily, Mary P. Jose, NP, 12.5 mg at 05/20/19 0814    losartan tablet 50 mg, 50 mg, Oral, Daily, Mary P. Jose, NP, 50 mg at 05/20/19 0814    ondansetron injection 4 mg, 4 mg, Intravenous, Q8H PRN, JACKELINE Montgomery    oxyCODONE-acetaminophen 7.5-325 mg per tablet 1 tablet, 1 tablet, Oral, Q6H PRN, Mary P. Jose, NP, 1 tablet at 05/19/19 1012    polyethylene glycol packet 17 g, 17 g, Oral, BID, Mary P. Jose, NP, 17 g at 05/20/19 0815    propranolol tablet 40 mg, 40 mg, Oral, BID, Mary P.  Jose, NP, 40 mg at 05/20/19 0814    senna-docusate 8.6-50 mg per tablet 2 tablet, 2 tablet, Oral, QHS, Mary Garcia NP, 2 tablet at 05/19/19 1033    sodium chloride 0.9% bolus 500 mL, 500 mL, Intravenous, Continuous PRN, Alayna Mazariegos, FNP    sodium chloride 0.9% flush 3 mL, 3 mL, Intravenous, Q8H, Alayna Mazariegos, FNP, 3 mL at 05/20/19 0630    SOCIAL HISTORY:  Social History     Socioeconomic History    Marital status:      Spouse name: Not on file    Number of children: Not on file    Years of education: Not on file    Highest education level: Not on file   Occupational History    Not on file   Social Needs    Financial resource strain: Not on file    Food insecurity:     Worry: Not on file     Inability: Not on file    Transportation needs:     Medical: Not on file     Non-medical: Not on file   Tobacco Use    Smoking status: Never Smoker    Smokeless tobacco: Never Used   Substance and Sexual Activity    Alcohol use: No    Drug use: No    Sexual activity: Yes     Partners: Male     Birth control/protection: Post-menopausal   Lifestyle    Physical activity:     Days per week: Not on file     Minutes per session: Not on file    Stress: Not on file   Relationships    Social connections:     Talks on phone: Not on file     Gets together: Not on file     Attends Buddhism service: Not on file     Active member of club or organization: Not on file     Attends meetings of clubs or organizations: Not on file     Relationship status: Not on file   Other Topics Concern    Not on file   Social History Narrative    Not on file       FAMILY HISTORY:  Family History   Problem Relation Age of Onset    Diabetes Maternal Grandmother     Diabetes Maternal Grandfather     Diabetes Mother     Breast cancer Neg Hx     Colon cancer Neg Hx     Ovarian cancer Neg Hx        PHYSICAL EXAMINATION:  Constitutional: well appearing, no acute distress, ECOG 0 - Fully Active  Vitals:    /79   " Pulse 63   Temp 97.6 °F (36.4 °C)   Resp 18   Ht 5' 9" (1.753 m)   Wt (!) 159.5 kg (351 lb 10.1 oz)   LMP  (LMP Unknown)   SpO2 98%   Breastfeeding? No   BMI 51.93 kg/m²   Cardiovascular: regular rate, no murmurs, no edema of the upper or lower extremities, radial pulse 2+  Respiratory: unlabored effort, clear to auscultation, no wheezes    IMAGING AND LABORATORY FINDINGS: As per HPI; images reviewed personally.    ASSESSMENT:  Recovered well from hypertensive urgency    PLAN:  I discussed the case with Dr. Harris in her hospital room today.  Her blood pressure is under much better control.  He thinks it is due to irregular dosing of clonidine.  She will be discharged today.  We will start radiation today.    We discussed the techniques, toxicities and indications of radiation and I answered the patient's questions to their apparent satisfaction.    JACQUE Arreguin M.D.  Radiation Oncology  [facility] [address] [place] [address]  Ph: [phone]  [email or web address]      "

## 2019-05-20 NOTE — CONSULTS
Food & Nutrition  Education    Diet Education: Weight Loss Tips and Heart Healthy Diet  Time Spent: 30 minutes  Learners:pt      Nutrition Education provided with handouts:  Weight Loss Tips and Heart Healthy Diet (low Na + fat)    Comments: Educated pt on above diets with handouts from the Nutrition Care Manual. All questions and concerns answered. Dietitian's contact information provided. Pt w/ good appetite PTA and no recent wt loss. NFPE not performed d/t pt w/ no signs of malnutrition.      Please Re-consult as needed    Thanks!  JG McnealN

## 2019-05-20 NOTE — PROGRESS NOTES
Communicated patient care with Mary Garcia NP over the telephone. Updated on patient status and new onset of loud exp.wheezing and coarse lung sounds. Patient spent the night with frequent headache. Pain subsided momentarily to 6/10 after Toradol iv was given. Patient c/o coughing unrelieved and headache. HCTZ was initiated yesterday at 1033 and is currently scheduled daily. Mucinex was also initiated yesterday at 1033 to be Q12H(see MAR).    Patient is concerned that BP to left forearm are inaccurate.

## 2019-05-20 NOTE — PLAN OF CARE
CM met with patient at the bedside.  Patient requested a HH nurse and rollator.  She does not have a preference of companies.  Choice form completed and placed in patient blue folder.  Referral made via [facility] to [facility]

## 2019-05-20 NOTE — PROGRESS NOTES
Ok to discharge the pt to the cancer center next door. They will come and transport the pt over at 1400. Pt is pending home o2 evaluation at this time. As well as HH arrangement

## 2019-05-20 NOTE — PLAN OF CARE
Pt AAOx4. Speech clear and intelligable. POC discussed w/patient, verbalized understanding. Heart rates between 59-72 on monitor: normal sinus.VS under WNL when blood pressure has been taken on left wrist. RR: 40 shallow and diminished, SATs well. Lung breath sounds are exp.wheezes and coarse. Voids per BRP independently. Ambulation steady, slowly without device(s). Patient turns independently in bed. Fall precautions in place, bed alarm REFUSED, SCDs in place, bed in lowest, call light and personal items within reach. SKIN: patient undergoing radiation therapy to right breast.    Patient reported 2 episodes of intense headache during dayshift. Has had 1 episode of intense headache during nightshift. Patient's vitals have been assessed more frequently to anticipate the need for intervention. However, clonidine has not met criteria for administration during episode. (See MAR for clonidine orders). Dose of Toradol iv (now discontinued) appears to have resolved patient's headache. Observed patient snoring in bed, left side-lying position, no distress noted.

## 2019-05-20 NOTE — TELEPHONE ENCOUNTER
Patient currently inpatient per warning advisory.    Forwarded to Dr. Rojas for review as I cannot remove request.

## 2019-05-20 NOTE — PROGRESS NOTES
AVS provided to the pt. Apt scheduled for the pts pcp. She states she sees Dr Rojas now instead of the one listed. Apt made for 5/24. All other follow ups in place. Pt verbalized understanding of her medications and dc instructions, she states her  is going to  her medications at the [facility] pharmacy as he is able to pay for them. Since the pt is dc'd the pt will be picked up from [facility] at 1400 today and not return. Tele monitor removed and returned to MT room. PIV removed, catheter remained in tact. No further questions at this time from the pt.

## 2019-05-20 NOTE — PT/OT/SLP PROGRESS
Physical Therapy  Treatment    Susu Luther   MRN: [id number]   Admitting Diagnosis: Expressive aphasia    PT Received On: 05/20/19  PT Start Time: 1015     PT Stop Time: 1040    PT Total Time (min): 25 min       Billable Minutes:  Gait Training 15 and Therapeutic Exercise 10    Treatment Type: Treatment  PT/PTA: PTA     PTA Visit Number: 1       General Precautions: Standard, fall  Orthopedic Precautions: N/A   Braces: N/A    Spiritual, Cultural Beliefs, Hinduism Practices, Values that Affect Care: no    Subjective:  Communicated with NURSE, JOSEMANUEL AND [facility] CHART REVIEW  prior to session.  PATIENT AGREE TO TX NOW.    Pain/Comfort  Pain Rating 1: 6/10  Location - Side 1: Bilateral  Location - Orientation 1: upper  Location 1: head  Pain Addressed 1: Pre-medicate for activity, Cessation of Activity, Reposition, Distraction  Pain Rating Post-Intervention 1: 2/10    Objective:   Patient found with: peripheral IV, telemetry. PATIENT ASSISTED OOB TO B/S CHAIR, GT INTO HALLWAY , GIORGIO LE EXERCISE INSTRUCTION.    Functional Mobility:  Bed Mobility:    SUPINE TO SIT AT CLOSE SBAX1.    Transfers:   SIT TO STAND, STAND TO SIT AT CGAX1.    Gait:    AMBULATE 50'X2 WITH RW, GOOD  STEADY PACE, VERY SLOWED GT CLAIRE.    Stairs:  N/A    Balance:   Static Sit: GOOD-: Takes MODERATE challenges from all directions but inconsistently  Dynamic Sit: GOOD-: Incosistently Maintains balance through MODERATE excursions of active trunk movement,     Static Stand: GOOD-: Takes MODERATE challenges from all directions inconsistently  Dynamic stand: FAIR: Needs CONTACT GUARD during gait     Therapeutic Activities and Exercises:  GIORGIO LE EXERCISES, OOB T/FS TO B/S CHAIR, GT INTO HALLLWAY.    AM-PAC 6 CLICK MOBILITY  How much help from another person does this patient currently need?   1 = Unable, Total/Dependent Assistance  2 = A lot, Maximum/Moderate Assistance  3 = A little, Minimum/Contact Guard/Supervision  4 = None, Modified  [place]/Independent    Turning over in bed (including adjusting bedclothes, sheets and blankets)?: 4  Sitting down on and standing up from a chair with arms (e.g., wheelchair, bedside commode, etc.): 3  Moving from lying on back to sitting on the side of the bed?: 4  Moving to and from a bed to a chair (including a wheelchair)?: 3  Need to walk in hospital room?: 3  Climbing 3-5 steps with a railing?: 1  Basic Mobility Total Score: 18    AM-PAC Raw Score CMS G-Code Modifier Level of Impairment Assistance   6 % Total / Unable   7 - 9 CM 80 - 100% Maximal Assist   10 - 14 CL 60 - 80% Moderate Assist   15 - 19 CK 40 - 60% Moderate Assist   20 - 22 CJ 20 - 40% Minimal Assist   23 CI 1-20% SBA / CGA   24 CH 0% Independent/ Mod I     Patient left up in chair with all lines intact, call button in reach and chair alarm on.    Assessment:  Susu Luther is a 69 y.o. female with a medical diagnosis of Expressive aphasia . PATIENT COOPERATIVE, MOTIVATED TO INCREASE ACTIVITY LEVEL AS TOLERATED. PATIENT MOVES VERY SLOWLY, NO LOB NOTED , NO DIZZINESS REPORTED.  Rehab identified problem list/impairments: Rehab identified problem list/impairments: weakness, impaired self care skills, impaired balance, decreased coordination, impaired endurance, impaired functional mobilty, impaired sensation, gait instability, pain    Rehab potential is excellent.    Activity tolerance: Excellent    Discharge recommendations: Discharge Facility/Level of Care Needs: outpatient PT, outpatient OT     Barriers to discharge:      Equipment recommendations: Equipment Needed After Discharge: walker, rolling     GOALS:   Multidisciplinary Problems     Physical Therapy Goals        Problem: Physical Therapy Goal    Goal Priority Disciplines Outcome Goal Variances Interventions   Physical Therapy Goal     PT, PT/OT Ongoing (interventions implemented as appropriate)     Description:  1. Patient will perform sit to stand indep  2. Patient  will amb 300ft sin AD sup no gross LOB  3. Patient will perform B LE stand TE x 10 reps                    PLAN:    Patient to be seen 5 x/week  to address the above listed problems via gait training, therapeutic activities, therapeutic exercises  Plan of Care expires: 05/26/19  Plan of Care reviewed with: patient         Radha Castanon, PTA  05/20/2019

## 2019-05-20 NOTE — PT/OT/SLP EVAL
Occupational Therapy   Evaluation    Name: Susu Luther  MRN: [id number]  Admitting Diagnosis:  Expressive aphasia      Recommendations:     Discharge Recommendations: outpatient PT, outpatient OT  Discharge Equipment Recommendations:  walker, rolling  Barriers to discharge:       Assessment:     Susu Luther is a 69 y.o. female with a medical diagnosis of Expressive aphasia.  She presents with impaired functional mobility and ADLs. Performance deficits affecting function: weakness, impaired endurance, impaired self care skills, impaired functional mobilty, decreased coordination, decreased safety awareness, impaired balance, gait instability, decreased upper extremity function, decreased lower extremity function.      Rehab Prognosis: Good; patient would benefit from acute skilled OT services to address these deficits and reach maximum level of function.       Plan:     Patient to be seen 3 x/week to address the above listed problems via self-care/home management, therapeutic activities, therapeutic exercises  · Plan of Care Expires: 05/27/19  · Plan of Care Reviewed with: patient    Subjective     Chief Complaint: weakness  Patient/Family Comments/goals: to return home    Occupational Profile:  Living Environment: lives with  in 1 story house 1 steps  Previous level of function: (I) with ADLs and Amb  Roles and Routines: Drives, not working  Equipment Used at Home:  cane, quad  Assistance upon Discharge: family    Pain/Comfort:  · Pain Rating 1: 8/10  · Location 1: head  · Pain Addressed 1: Reposition, Distraction  · Pain Rating Post-Intervention 1: 8/10    Patients cultural, spiritual, Roman Catholic conflicts given the current situation: no    Objective:     Communicated with: Nurse Jean and [facility] chart review prior to session.  Patient found supine with telemetry, peripheral IV upon OT entry to room.    General Precautions: Standard, fall   Orthopedic Precautions:N/A   Braces: N/A      Occupational Performance:    Bed Mobility:    · Patient completed Rolling/Turning to Left with  stand by assistance  · Patient completed Rolling/Turning to Right with stand by assistance  · Patient completed Scooting/Bridging with stand by assistance  · Patient completed Supine to Sit with stand by assistance    Functional Mobility/Transfers:  · Patient completed Sit <> Stand Transfer with contact guard assistance  with  rolling walker   · Patient completed Bed <> Chair Transfer using Step Transfer technique with minimum assistance with rolling walker  · Functional Mobility: Min A using RW     Activities of Daily Living:  · Upper Body Dressing: stand by assistance .  · Lower Body Dressing: contact guard assistance .    Cognitive/Visual Perceptual:  Cognitive/Psychosocial Skills:     -       Oriented to: Person, Place, Time and Situation   -       Follows Commands/attention:Follows multistep  commands  -       Communication: clear/fluent  -       Memory: No Deficits noted  -       Safety awareness/insight to disability: impaired   Visual/Perceptual:      -Intact .    Physical Exam:  Dominant hand:    -       right  Upper Extremity Range of Motion:     -       Right Upper Extremity: Deficits: decreased at shoulders  -       Left Upper Extremity: Deficits: decreased at shoulders  Upper Extremity Strength:    -       Right Upper Extremity: unable to assess   -       Left Upper Extremity: 4/5 grossly   Strength:    -       Right Upper Extremity: WFL  -       Left Upper Extremity: WFL    AMPAC 6 Click ADL:  AMPAC Total Score: 20    Treatment & Education:  OT eval completed as listed above. Pt educated in role of OT and POC.   Education:    Patient left up in chair with all lines intact, call button in reach, chair alarm on and Nurse cRYSTAL notified    GOALS:   Multidisciplinary Problems     Occupational Therapy Goals        Problem: Occupational Therapy Goal    Goal Priority Disciplines Outcome Interventions    Occupational Therapy Goal     OT, PT/OT     Description:  LTGS to be met by 19  1. Pt will perform toilet t/fs with Mod I  2. Pt will perform LE dressing with Supervision  3. Pt will perform UE dressing with Mod I  4. Pt will perform (B) UE ROM exercises 1x20 reps                    History:     Past Medical History:   Diagnosis Date    Encounter for blood transfusion     Hypertension     Malignant neoplasm of upper-outer quadrant of right breast in female, estrogen receptor positive 3/14/2019       Past Surgical History:   Procedure Laterality Date    APPENDECTOMY      BIOPSY, LYMPH NODE, SENTINEL Right 3/27/2019    Per[facility] OR     SECTION      x 1    LUMPECTOMY, BREAST Right 3/27/2019    [facility] at [facility] OR    OOPHORECTOMY      right        Time Tracking:     OT Date of Treatment: 19  OT Start Time: 815  OT Stop Time: 0840  OT Total Time (min): 25 min    Billable Minutes:Evaluation 15 MIN  Therapeutic Activity 10 MIN    Rachael Le, PT/OT  2019

## 2019-05-21 PROCEDURE — 77412 RADIATION TX DELIVERY LVL 3: CPT | Performed by: RADIOLOGY

## 2019-05-21 PROCEDURE — 77387 GUIDANCE FOR RADJ TX DLVR: CPT | Mod: 26,,, | Performed by: RADIOLOGY

## 2019-05-21 PROCEDURE — 77387 GUIDANCE FOR RADJ TX DLVR: CPT | Mod: TC | Performed by: RADIOLOGY

## 2019-05-21 PROCEDURE — 77387 PR GUIDANCE FOR RADIATION TREATMENT DELIVERY: ICD-10-PCS | Mod: 26,,, | Performed by: RADIOLOGY

## 2019-05-21 RX ORDER — BUTALBITAL, ACETAMINOPHEN AND CAFFEINE 50; 325; 40 MG/1; MG/1; MG/1
1 CAPSULE ORAL 3 TIMES DAILY
Qty: 30 CAPSULE | Refills: 0 | Status: SHIPPED | OUTPATIENT
Start: 2019-05-21 | End: 2019-06-13

## 2019-05-21 NOTE — TELEPHONE ENCOUNTER
Pt stated her blood pressure is still high, pt was seen in the ER on Saturday.  Pt scheduled to follow up with PCP for 05/24/19 regarding hospital follow up.  luis fernando

## 2019-05-21 NOTE — DISCHARGE SUMMARY
[facility] Hospital Medicine  Discharge Summary      Patient Name: Susu Luther  MRN: [id number]  Admission Date: 5/18/2019  Hospital Length of Stay: 1 days  Discharge Date and Time: 5/20/2019  2:37 PM  Attending Physician: No att. providers found   Discharging Provider: Faizan Harris MD  Primary Care Provider: Tomasz Cage MD      HPI:   Susu Luther is a 69 year old female with a PMHx of HTN, CVA (2009), Right breast CA, Lumpectomy 3/2019 who presented to the ER with c/o slurred speech x 1 month but worsened today around 2:00 AM. Associated symptoms include: headache. Pt reports her headache kept her up most the night to the point she took her BP at 3 AM. Pt reports systolic around 3 AM was 193. She reports taking a clonidine shortly thereafter. Headache and BP did not improve and subsequently came to ED to be evaluated. Pt is followed by Dr. Balbina Ashton at [facility]. Pt reports she tried to call and schedule appt with Dr. Ashton for slurred speech but not able to get in with her until September. ED workup showed: WBC count 8.46K, Hgb/Hct 10.8/34.6, , troponin 0.007. UA negative. CXR with no acute findings. CT head with no acute intracranial findings. MRI pending. Observation for aphasia.      * No surgery found *      Hospital Course:   CT head no acute findins. MRI negative for CVA. U/s no hemodynamically stenosis. Chest Xray negative for infiltrates. She admits to occasionally missing clonidine and she has gained quite a bit of weight over the past 2 years. B/P has remained high (180-200) with additional losartan for a total of 150 mg in 24 hours (takes 50 mg at home in the am). Will add HCTZ 12.5 mg and Propranolol 40 mg BID which will help B/P and HA. Patient stated Dr. Huynh, oncologist, consulted Dr. Arreguin for radiation and anastrozole 1 mg daily for 5 years. Patient stated Dr. Arreguin said her B/P was TOO high for radiation. Will recheck B/P later today.  "Patient BP improved with therapy and scheduled clonidine. Patient ruled out for acute CVA. Patient seen and examined today, appropriate for d/c. Patient to follow with Dr Arreguin for Rad Onc as scheduled today.     Consults:   Consults (From admission, onward)        Status Ordering Provider     IP consult to dietary  Once     Provider:  (Not yet assigned)    Completed DHAVAL DUMONT          No new Assessment & Plan notes have been filed under this hospital service since the last note was generated.  Service: Hospital Medicine    Final Active Diagnoses:    Diagnosis Date Noted POA    Malignant neoplasm of upper-outer quadrant of right breast in female, estrogen receptor positive [C50.411, Z17.0] 03/14/2019 Not Applicable     Chronic    Class 3 severe obesity due to excess calories with serious comorbidity and body mass index (BMI) of 50.0 to 59.9 in adult [E66.01, Z68.43] 11/28/2018 Not Applicable      Problems Resolved During this Admission:    Diagnosis Date Noted Date Resolved POA    PRINCIPAL PROBLEM:  Expressive aphasia [R47.01] 05/18/2019 05/20/2019 Yes    Hypertensive urgency, malignant [I16.0] 02/28/2019 05/20/2019 Yes       Discharged Condition: stable    Disposition: Home-Health Care [facility]    Follow Up:  Follow-up Information     Tomasz Cage MD In 3 days.    Specialty:  Internal Medicine  Contact information:  [address] [address] [phone]             [facility].    Specialty:  Home Health Services  Contact information:  [address], [address]  [place] [address] [phone]             [facility]    Specialty:  DME Provider  Why:  DME- rollator  Contact information:  [address]  [facility] [id number] [phone]                 Patient Instructions:      WALKER FOR HOME USE     Order Specific Question Answer Comments   Type of Walker: Heavy duty    With wheels? Yes    Height: 5' 9" (1.753 m)    Weight: 159.5 kg (351 " lb 10.1 oz)    Length of need (1-99 months): 99    Does patient have medical equipment at home? cane, quad    Does patient have medical equipment at home? walker, rolling    Please check all that apply: Patient's condition impairs ambulation.    Please check all that apply: Patient is unable to safely ambulate without equipment.      Diet renal     Activity as tolerated       Significant Diagnostic Studies: Labs:   BMP:   Recent Labs   Lab 05/19/19  0416 05/20/19  0410    103    142   K 3.7 4.4   * 113*   CO2 20* 22*   BUN 20 19   CREATININE 0.9 1.1   CALCIUM 9.2 9.3   MG 1.8  --    , CMP   Recent Labs   Lab 05/19/19  0416 05/20/19  0410    142   K 3.7 4.4   * 113*   CO2 20* 22*    103   BUN 20 19   CREATININE 0.9 1.1   CALCIUM 9.2 9.3   PROT 6.5 6.5   ALBUMIN 2.7* 2.7*   BILITOT 0.3 0.2   ALKPHOS 123 121   AST 15 15   ALT 21 22   ANIONGAP 9 7*   ESTGFRAFRICA >60 >60   EGFRNONAA >60 52*   , CBC   Recent Labs   Lab 05/19/19  0416 05/20/19  0410   WBC 7.70 9.13   HGB 10.5* 10.8*   HCT 34.0* 35.4*    290   , Troponin   Recent Labs   Lab 05/18/19  2301   TROPONINI 0.019    and All labs within the past 24 hours have been reviewed    Pending Diagnostic Studies:     None         Medications:  Reconciled Home Medications:      Medication List      START taking these medications    aspirin 81 MG EC tablet  Commonly known as:  ECOTRIN  Take 1 tablet (81 mg total) by mouth once daily.  Start taking on:  5/21/2019     atorvastatin 40 MG tablet  Commonly known as:  LIPITOR  Take 1 tablet (40 mg total) by mouth once daily.  Start taking on:  5/21/2019     emollient combination no.63 Lotn  Commonly known as:  MIADERM  Apply 1 application topically 3 (three) times daily.     propranolol 40 MG tablet  Commonly known as:  INDERAL  Take 1 tablet (40 mg total) by mouth 2 (two) times daily.        CHANGE how you take these medications    losartan 50 MG tablet  Commonly known as:  COZAAR  Take  1 tablet (50 mg total) by mouth once daily.  Start taking on:  5/21/2019  What changed:    · medication strength  · how much to take        CONTINUE taking these medications    anastrozole 1 mg Tab  Commonly known as:  ARIMIDEX  Take 1 tablet (1 mg total) by mouth once daily.     blood pressure kit med and lrg Kit  Take blood pressure first thing in the morning.     butalbital-acetaminophen-caff -40 mg -40 mg Cap  Take 1 tablet by mouth 3 (three) times daily.     cholecalciferol (vitamin D3) 50,000 unit capsule  Take 50,000 Units by mouth once a week.     cloNIDine 0.1 MG tablet  Commonly known as:  CATAPRES  Take 1 tablet (0.1 mg total) by mouth 3 (three) times daily.     diazePAM 10 MG Tab  Commonly known as:  VALIUM  Take 1 tablet (10 mg total) by mouth once daily.     gabapentin 300 MG capsule  Commonly known as:  NEURONTIN  Take 1 capsule (300 mg total) by mouth 3 (three) times daily. One capsule on the 1st day, 2 capsules on the 2nd day and then t.i.d.     hydroCHLOROthiazide 12.5 mg capsule  Commonly known as:  MICROZIDE  Take 1 capsule (12.5 mg total) by mouth once daily.     oxyCODONE-acetaminophen 7.5-325 mg per tablet  Commonly known as:  PERCOCET  Take 1 tablet by mouth every 6 (six) hours as needed for Pain.        STOP taking these medications    OLANZapine 10 MG tablet  Commonly known as:  ZyPREXA            Indwelling Lines/Drains at time of discharge:   Lines/Drains/Airways          None          Time spent on the discharge of patient: 35 minutes  Patient was seen and examined on the date of discharge and determined to be suitable for discharge.         Faizan Harris MD  Department of Hospital Medicine  [facility]

## 2019-05-22 ENCOUNTER — DOCUMENTATION ONLY (OUTPATIENT)
Dept: RADIATION ONCOLOGY | Facility: CLINIC | Age: 69
End: 2019-05-22

## 2019-05-22 PROCEDURE — 77387 GUIDANCE FOR RADJ TX DLVR: CPT | Mod: 26,,, | Performed by: RADIOLOGY

## 2019-05-22 PROCEDURE — 77387 GUIDANCE FOR RADJ TX DLVR: CPT | Mod: TC | Performed by: RADIOLOGY

## 2019-05-22 PROCEDURE — 77387 PR GUIDANCE FOR RADIATION TREATMENT DELIVERY: ICD-10-PCS | Mod: 26,,, | Performed by: RADIOLOGY

## 2019-05-22 PROCEDURE — 77412 RADIATION TX DELIVERY LVL 3: CPT | Performed by: RADIOLOGY

## 2019-05-22 NOTE — PLAN OF CARE
Problem: Adult Inpatient Plan of Care  Goal: Plan of Care Review  Outcome: Ongoing (interventions implemented as appropriate)  Day 3 outpatient xrt to right breast. c/o headache and fatigue, BP slightly evelated 134/83, very mild erythema, using miaderm cream. Will continue to monitor.

## 2019-05-23 PROCEDURE — 77387 GUIDANCE FOR RADJ TX DLVR: CPT | Mod: TC | Performed by: RADIOLOGY

## 2019-05-23 PROCEDURE — 77387 PR GUIDANCE FOR RADIATION TREATMENT DELIVERY: ICD-10-PCS | Mod: 26,,, | Performed by: RADIOLOGY

## 2019-05-23 PROCEDURE — 77387 GUIDANCE FOR RADJ TX DLVR: CPT | Mod: 26,,, | Performed by: RADIOLOGY

## 2019-05-23 PROCEDURE — 77412 RADIATION TX DELIVERY LVL 3: CPT | Performed by: RADIOLOGY

## 2019-05-24 ENCOUNTER — OFFICE VISIT (OUTPATIENT)
Dept: INTERNAL MEDICINE | Facility: CLINIC | Age: 69
End: 2019-05-24
Payer: MEDICARE

## 2019-05-24 VITALS
HEIGHT: 69 IN | WEIGHT: 293 LBS | BODY MASS INDEX: 43.4 KG/M2 | DIASTOLIC BLOOD PRESSURE: 88 MMHG | TEMPERATURE: 98 F | SYSTOLIC BLOOD PRESSURE: 138 MMHG | OXYGEN SATURATION: 95 % | HEART RATE: 58 BPM

## 2019-05-24 DIAGNOSIS — Z17.0 MALIGNANT NEOPLASM OF UPPER-OUTER QUADRANT OF RIGHT BREAST IN FEMALE, ESTROGEN RECEPTOR POSITIVE: Primary | Chronic | ICD-10-CM

## 2019-05-24 DIAGNOSIS — G47.33 OSA (OBSTRUCTIVE SLEEP APNEA): Chronic | ICD-10-CM

## 2019-05-24 DIAGNOSIS — R06.89 DYSPNEA AND RESPIRATORY ABNORMALITIES: ICD-10-CM

## 2019-05-24 DIAGNOSIS — C50.411 MALIGNANT NEOPLASM OF UPPER-OUTER QUADRANT OF RIGHT BREAST IN FEMALE, ESTROGEN RECEPTOR POSITIVE: Primary | Chronic | ICD-10-CM

## 2019-05-24 DIAGNOSIS — R06.00 DYSPNEA AND RESPIRATORY ABNORMALITIES: ICD-10-CM

## 2019-05-24 PROCEDURE — 99213 OFFICE O/P EST LOW 20 MIN: CPT | Mod: S$GLB,,, | Performed by: FAMILY MEDICINE

## 2019-05-24 PROCEDURE — 3075F SYST BP GE 130 - 139MM HG: CPT | Mod: CPTII,S$GLB,, | Performed by: FAMILY MEDICINE

## 2019-05-24 PROCEDURE — 3079F DIAST BP 80-89 MM HG: CPT | Mod: CPTII,S$GLB,, | Performed by: FAMILY MEDICINE

## 2019-05-24 PROCEDURE — 1101F PT FALLS ASSESS-DOCD LE1/YR: CPT | Mod: CPTII,S$GLB,, | Performed by: FAMILY MEDICINE

## 2019-05-24 PROCEDURE — 99999 PR PBB SHADOW E&M-EST. PATIENT-LVL III: ICD-10-PCS | Mod: PBBFAC,,, | Performed by: FAMILY MEDICINE

## 2019-05-24 PROCEDURE — 1101F PR PT FALLS ASSESS DOC 0-1 FALLS W/OUT INJ PAST YR: ICD-10-PCS | Mod: CPTII,S$GLB,, | Performed by: FAMILY MEDICINE

## 2019-05-24 PROCEDURE — 3079F PR MOST RECENT DIASTOLIC BLOOD PRESSURE 80-89 MM HG: ICD-10-PCS | Mod: CPTII,S$GLB,, | Performed by: FAMILY MEDICINE

## 2019-05-24 PROCEDURE — 77417 THER RADIOLOGY PORT IMAGE(S): CPT | Performed by: RADIOLOGY

## 2019-05-24 PROCEDURE — 99213 PR OFFICE/OUTPT VISIT, EST, LEVL III, 20-29 MIN: ICD-10-PCS | Mod: S$GLB,,, | Performed by: FAMILY MEDICINE

## 2019-05-24 PROCEDURE — 77412 RADIATION TX DELIVERY LVL 3: CPT | Performed by: RADIOLOGY

## 2019-05-24 PROCEDURE — 99999 PR PBB SHADOW E&M-EST. PATIENT-LVL III: CPT | Mod: PBBFAC,,, | Performed by: FAMILY MEDICINE

## 2019-05-24 PROCEDURE — 3075F PR MOST RECENT SYSTOLIC BLOOD PRESS GE 130-139MM HG: ICD-10-PCS | Mod: CPTII,S$GLB,, | Performed by: FAMILY MEDICINE

## 2019-05-24 PROCEDURE — 77336 RADIATION PHYSICS CONSULT: CPT | Performed by: RADIOLOGY

## 2019-05-24 RX ORDER — DIAZEPAM 10 MG/1
10 TABLET ORAL 2 TIMES DAILY
Qty: 30 TABLET | Refills: 1 | Status: SHIPPED | OUTPATIENT
Start: 2019-05-24 | End: 2019-06-03

## 2019-05-24 NOTE — PROGRESS NOTES
Subjective:       Patient ID: Susu Luther is a 69 y.o. female.    Chief Complaint: Follow-up (from hospital)    F/U:      Pt is a 69 year old who had elevated blood pressure and went to ER. This happened at night. Pt has had a work-up for cardiac was normal. Pt BP has been controlled with clonidine .1 mg tid. Pt is being worked up with Pulmonary for sleep apnea but this work up appears to be in progress.    Review of Systems   Constitutional: Negative.    Eyes: Negative.    Respiratory: Negative.    Cardiovascular: Negative.    Gastrointestinal: Negative.    Genitourinary: Negative.    Musculoskeletal: Negative.    Neurological: Negative.    Hematological: Negative.    Psychiatric/Behavioral: Negative.        Objective:      Physical Exam   Constitutional: She is oriented to person, place, and time. She appears well-developed and well-nourished.   Cardiovascular: Normal rate and regular rhythm. Exam reveals no friction rub.   Pulmonary/Chest: Effort normal and breath sounds normal. No stridor. She has no wheezes.   Abdominal: Soft. Bowel sounds are normal.   Neurological: She is alert and oriented to person, place, and time.   Psychiatric: She has a normal mood and affect. Her behavior is normal.       Assessment:       1. Malignant neoplasm of upper-outer quadrant of right breast in female, estrogen receptor positive    2. NILS (obstructive sleep apnea)    3. Dyspnea and respiratory abnormalities        Plan:       Malignant neoplasm of upper-outer quadrant of right breast in female, estrogen receptor positive  Comments:  Pt is seeing hematololgy oncology    NILS (obstructive sleep apnea)  Comments:  Pt needs to be followed with Pulmonary    Dyspnea and respiratory abnormalities  Comments:  Will get pt in to see Pulmonary

## 2019-05-27 PROCEDURE — 77387 GUIDANCE FOR RADJ TX DLVR: CPT | Mod: TC | Performed by: RADIOLOGY

## 2019-05-27 PROCEDURE — 77412 RADIATION TX DELIVERY LVL 3: CPT | Performed by: RADIOLOGY

## 2019-05-27 PROCEDURE — 77387 GUIDANCE FOR RADJ TX DLVR: CPT | Mod: 26,,, | Performed by: RADIOLOGY

## 2019-05-27 PROCEDURE — 77417 THER RADIOLOGY PORT IMAGE(S): CPT | Performed by: RADIOLOGY

## 2019-05-27 PROCEDURE — 77387 PR GUIDANCE FOR RADIATION TREATMENT DELIVERY: ICD-10-PCS | Mod: 26,,, | Performed by: RADIOLOGY

## 2019-05-28 PROCEDURE — 77412 RADIATION TX DELIVERY LVL 3: CPT | Performed by: RADIOLOGY

## 2019-05-28 PROCEDURE — 77387 PR GUIDANCE FOR RADIATION TREATMENT DELIVERY: ICD-10-PCS | Mod: 26,,, | Performed by: RADIOLOGY

## 2019-05-28 PROCEDURE — 77387 GUIDANCE FOR RADJ TX DLVR: CPT | Mod: 26,,, | Performed by: RADIOLOGY

## 2019-05-28 PROCEDURE — 77387 GUIDANCE FOR RADJ TX DLVR: CPT | Mod: TC | Performed by: RADIOLOGY

## 2019-05-29 ENCOUNTER — DOCUMENTATION ONLY (OUTPATIENT)
Dept: RADIATION ONCOLOGY | Facility: CLINIC | Age: 69
End: 2019-05-29

## 2019-05-29 PROCEDURE — 77412 RADIATION TX DELIVERY LVL 3: CPT | Performed by: RADIOLOGY

## 2019-05-29 PROCEDURE — 77387 GUIDANCE FOR RADJ TX DLVR: CPT | Mod: 26,,, | Performed by: RADIOLOGY

## 2019-05-29 PROCEDURE — 77387 GUIDANCE FOR RADJ TX DLVR: CPT | Mod: TC | Performed by: RADIOLOGY

## 2019-05-29 PROCEDURE — 77387 PR GUIDANCE FOR RADIATION TREATMENT DELIVERY: ICD-10-PCS | Mod: 26,,, | Performed by: RADIOLOGY

## 2019-05-29 NOTE — PLAN OF CARE
Problem: Adult Inpatient Plan of Care  Goal: Plan of Care Review  Outcome: Ongoing (interventions implemented as appropriate)  Day outpatient xrt to right breast. headache today, /77. very mild erythema, no skin breakdown. burning and slight throbbing pain, given gel pack to use. Using miaderm cream. Will continue to monitor.

## 2019-05-31 PROCEDURE — 77334 RADIATION TREATMENT AID(S): CPT | Mod: TC | Performed by: RADIOLOGY

## 2019-05-31 PROCEDURE — 77307 TELETHX ISODOSE PLAN CPLX: CPT | Mod: TC | Performed by: RADIOLOGY

## 2019-05-31 PROCEDURE — 77334 PR  RADN TREATMENT AID(S) COMPLX: ICD-10-PCS | Mod: 26,,, | Performed by: RADIOLOGY

## 2019-05-31 PROCEDURE — 77307 PR TELETHERAPY ISODOSE PLAN; COMPLEX: ICD-10-PCS | Mod: 26,,, | Performed by: RADIOLOGY

## 2019-05-31 PROCEDURE — 77307 TELETHX ISODOSE PLAN CPLX: CPT | Mod: 26,,, | Performed by: RADIOLOGY

## 2019-05-31 PROCEDURE — 77334 RADIATION TREATMENT AID(S): CPT | Mod: 26,,, | Performed by: RADIOLOGY

## 2019-06-03 ENCOUNTER — HOSPITAL ENCOUNTER (OUTPATIENT)
Dept: RADIATION THERAPY | Facility: HOSPITAL | Age: 69
Discharge: HOME | End: 2019-06-03
Attending: RADIOLOGY
Payer: COMMERCIAL

## 2019-06-03 PROCEDURE — 77387 GUIDANCE FOR RADJ TX DLVR: CPT | Mod: 26,,, | Performed by: RADIOLOGY

## 2019-06-03 PROCEDURE — 77412 RADIATION TX DELIVERY LVL 3: CPT | Performed by: RADIOLOGY

## 2019-06-03 PROCEDURE — 77387 PR GUIDANCE FOR RADIATION TREATMENT DELIVERY: ICD-10-PCS | Mod: 26,,, | Performed by: RADIOLOGY

## 2019-06-03 PROCEDURE — 77387 GUIDANCE FOR RADJ TX DLVR: CPT | Mod: TC | Performed by: RADIOLOGY

## 2019-06-04 PROCEDURE — 77387 PR GUIDANCE FOR RADIATION TREATMENT DELIVERY: ICD-10-PCS | Mod: 26,,, | Performed by: RADIOLOGY

## 2019-06-04 PROCEDURE — 77412 RADIATION TX DELIVERY LVL 3: CPT | Performed by: RADIOLOGY

## 2019-06-04 PROCEDURE — 77387 GUIDANCE FOR RADJ TX DLVR: CPT | Mod: 26,,, | Performed by: RADIOLOGY

## 2019-06-04 PROCEDURE — 77387 GUIDANCE FOR RADJ TX DLVR: CPT | Mod: TC | Performed by: RADIOLOGY

## 2019-06-04 RX ORDER — DIAZEPAM 10 MG/1
10 TABLET ORAL 2 TIMES DAILY
Qty: 30 TABLET | Refills: 1 | Status: SHIPPED | OUTPATIENT
Start: 2019-06-04 | End: 2019-06-13

## 2019-06-05 ENCOUNTER — DOCUMENTATION ONLY (OUTPATIENT)
Dept: RADIATION ONCOLOGY | Facility: CLINIC | Age: 69
End: 2019-06-05

## 2019-06-05 PROCEDURE — 77387 GUIDANCE FOR RADJ TX DLVR: CPT | Mod: TC | Performed by: RADIOLOGY

## 2019-06-05 PROCEDURE — 77387 PR GUIDANCE FOR RADIATION TREATMENT DELIVERY: ICD-10-PCS | Mod: 26,,, | Performed by: RADIOLOGY

## 2019-06-05 PROCEDURE — 77336 RADIATION PHYSICS CONSULT: CPT | Performed by: RADIOLOGY

## 2019-06-05 PROCEDURE — 77417 THER RADIOLOGY PORT IMAGE(S): CPT | Performed by: RADIOLOGY

## 2019-06-05 PROCEDURE — 77387 GUIDANCE FOR RADJ TX DLVR: CPT | Mod: 26,,, | Performed by: RADIOLOGY

## 2019-06-05 PROCEDURE — 77412 RADIATION TX DELIVERY LVL 3: CPT | Performed by: RADIOLOGY

## 2019-06-05 NOTE — PLAN OF CARE
Problem: Adult Inpatient Plan of Care  Goal: Plan of Care Review  Outcome: Ongoing (interventions implemented as appropriate)  Day 11 outpatient xrt to right breast. Patient states she is feeling good today, no headache. /96, BP managed by PCP Jorge. Will continue to monitor.

## 2019-06-06 ENCOUNTER — OFFICE VISIT (OUTPATIENT)
Dept: HEMATOLOGY/ONCOLOGY | Facility: CLINIC | Age: 69
End: 2019-06-06
Payer: COMMERCIAL

## 2019-06-06 ENCOUNTER — TELEPHONE (OUTPATIENT)
Dept: INTERNAL MEDICINE | Facility: CLINIC | Age: 69
End: 2019-06-06

## 2019-06-06 VITALS
TEMPERATURE: 98 F | BODY MASS INDEX: 43.4 KG/M2 | RESPIRATION RATE: 18 BRPM | DIASTOLIC BLOOD PRESSURE: 82 MMHG | OXYGEN SATURATION: 96 % | WEIGHT: 293 LBS | HEART RATE: 64 BPM | SYSTOLIC BLOOD PRESSURE: 127 MMHG | HEIGHT: 69 IN

## 2019-06-06 DIAGNOSIS — Z17.0 MALIGNANT NEOPLASM OF UPPER-OUTER QUADRANT OF RIGHT BREAST IN FEMALE, ESTROGEN RECEPTOR POSITIVE: Primary | Chronic | ICD-10-CM

## 2019-06-06 DIAGNOSIS — C50.411 MALIGNANT NEOPLASM OF UPPER-OUTER QUADRANT OF RIGHT BREAST IN FEMALE, ESTROGEN RECEPTOR POSITIVE: Primary | Chronic | ICD-10-CM

## 2019-06-06 PROCEDURE — 3079F DIAST BP 80-89 MM HG: CPT | Mod: CPTII,S$GLB,, | Performed by: INTERNAL MEDICINE

## 2019-06-06 PROCEDURE — 99214 PR OFFICE/OUTPT VISIT, EST, LEVL IV, 30-39 MIN: ICD-10-PCS | Mod: S$GLB,,, | Performed by: INTERNAL MEDICINE

## 2019-06-06 PROCEDURE — 1101F PR PT FALLS ASSESS DOC 0-1 FALLS W/OUT INJ PAST YR: ICD-10-PCS | Mod: CPTII,S$GLB,, | Performed by: INTERNAL MEDICINE

## 2019-06-06 PROCEDURE — 77387 GUIDANCE FOR RADJ TX DLVR: CPT | Mod: TC | Performed by: RADIOLOGY

## 2019-06-06 PROCEDURE — 3079F PR MOST RECENT DIASTOLIC BLOOD PRESSURE 80-89 MM HG: ICD-10-PCS | Mod: CPTII,S$GLB,, | Performed by: INTERNAL MEDICINE

## 2019-06-06 PROCEDURE — 77412 RADIATION TX DELIVERY LVL 3: CPT | Performed by: RADIOLOGY

## 2019-06-06 PROCEDURE — 99999 PR PBB SHADOW E&M-EST. PATIENT-LVL III: ICD-10-PCS | Mod: PBBFAC,,, | Performed by: INTERNAL MEDICINE

## 2019-06-06 PROCEDURE — 99999 PR PBB SHADOW E&M-EST. PATIENT-LVL III: CPT | Mod: PBBFAC,,, | Performed by: INTERNAL MEDICINE

## 2019-06-06 PROCEDURE — 3074F SYST BP LT 130 MM HG: CPT | Mod: CPTII,S$GLB,, | Performed by: INTERNAL MEDICINE

## 2019-06-06 PROCEDURE — 77387 GUIDANCE FOR RADJ TX DLVR: CPT | Mod: 26,,, | Performed by: RADIOLOGY

## 2019-06-06 PROCEDURE — 99214 OFFICE O/P EST MOD 30 MIN: CPT | Mod: S$GLB,,, | Performed by: INTERNAL MEDICINE

## 2019-06-06 PROCEDURE — 1101F PT FALLS ASSESS-DOCD LE1/YR: CPT | Mod: CPTII,S$GLB,, | Performed by: INTERNAL MEDICINE

## 2019-06-06 PROCEDURE — 3074F PR MOST RECENT SYSTOLIC BLOOD PRESSURE < 130 MM HG: ICD-10-PCS | Mod: CPTII,S$GLB,, | Performed by: INTERNAL MEDICINE

## 2019-06-06 PROCEDURE — 77387 PR GUIDANCE FOR RADIATION TREATMENT DELIVERY: ICD-10-PCS | Mod: 26,,, | Performed by: RADIOLOGY

## 2019-06-06 RX ORDER — MORPHINE SULFATE 15 MG/1
15 TABLET ORAL EVERY 6 HOURS PRN
Qty: 20 TABLET | Refills: 0 | Status: SHIPPED | OUTPATIENT
Start: 2019-06-06 | End: 2019-06-06 | Stop reason: SDUPTHER

## 2019-06-06 RX ORDER — MORPHINE SULFATE 15 MG/1
15 TABLET ORAL EVERY 6 HOURS PRN
Qty: 20 TABLET | Refills: 0 | Status: SHIPPED | OUTPATIENT
Start: 2019-06-06 | End: 2019-07-09 | Stop reason: SDUPTHER

## 2019-06-06 RX ORDER — TRAMADOL HYDROCHLORIDE 50 MG/1
50 TABLET ORAL EVERY 6 HOURS PRN
Qty: 50 TABLET | Refills: 0 | Status: SHIPPED | OUTPATIENT
Start: 2019-06-06 | End: 2019-07-09 | Stop reason: SDUPTHER

## 2019-06-06 RX ORDER — TRAMADOL HYDROCHLORIDE 50 MG/1
50 TABLET ORAL EVERY 6 HOURS PRN
Qty: 50 TABLET | Refills: 0 | Status: SHIPPED | OUTPATIENT
Start: 2019-06-06 | End: 2019-06-06 | Stop reason: SDUPTHER

## 2019-06-06 NOTE — PROGRESS NOTES
Subjective:       Patient ID: Susu Luther is a 69 y.o. female.    Chief Complaint: Malignant neoplasm of upper-outer quadrant of right breast in female, estrogen receptor positive [C50.411, Z17.0]  HPI: We have an opportunity to see Ms. Susu Luther in Hematology Oncology clinic at [facility] on 06/06/2019.  Ms. Susu Luther is a 69 y.o. woman with pT2N0 invasive lobular breast cancer ER positive ID negative Her2 negative s/p lumpectomy and sentinel node biopsy.       Was found right breast mass on self breast exam.  Screening mammogram on 2/26/2019 showed Impression:  Right  Mass: Right breast mass at the lower outer middle position. Assessment: 0 - Incomplete. Special Views: Spot Compression View and Ultrasound are recommended.      Left  There is no mammographic evidence of malignancy.     On March 4, 2019, underwent US guided biopsy showed FINAL PATHOLOGIC DIAGNOSIS  1. BREAST, RIGHT, LOWER OUTER 08:00, ULTRASOUND-GUIDED BIOPSY:  Invasive lobular carcinoma of breast.  Size of invasive carcinoma: 19 mm in greatest linear dimension within a single core biopsy fragment.  [place] Histologic Score: Grade 2 of 3.  Tubule formation: 3  Nuclear pleomorphism: 2  Mitoses: 1  Associated lobular carcinoma in situ (LCIS) is present.  No lymphovascular or perineural invasion.  Breast biomarkers are pending and will be included in the supplemental report.  2. AXILLA, RIGHT LYMPH NODES, ULTRASOUND-GUIDED BIOPSY:  Benign lymph node without evidence of metastatic carcinoma.  Immunohistochemical stains (Cam 5.2 and CK7) are confirmatory.     On March 27, 2019 underwent lumpectomy with sentinel node.  Pathology showed   PROCEDURE: Excision/lumpectomy  SPECIMEN LATERALITY: Right breast  TUMOR SITE: 8 o'clock  TUMOR SIZE: Approximately 4.0 x 3.0 x 3.0 cm  HISTOLOGIC TYPE: Invasive lobular carcinoma  HISTOLOGIC GRADE: Grade 2 of 3  Tubular differentiation: 3  Nuclear pleomorphism: 2  Mitotic rate:  1  TUMOR FOCALITY: Single focus of invasive carcinoma  DUCTAL CARCINOMA IN SITU: Not identified  LOBULAR CARCINOMA IN SITU: Present  MARGINS:  Main specimen (#6) : Carcinoma present at superior, deep and medial margins  Distance from inferior margin: 5 mm  Distance from lateral margin: greater than 20 mm  Distance of anterior margin: 15 mm  Additionally submitted margins (specimen #7): Distance from newly designated margin: 4 mm  REGIONAL LYMPH NODES: Uninvolved tumor cells  Number of total lymph nodes examined: 8  Number of sentinel nodes examined: 4  TREATMENT EFFECT: No known presurgical therapy  LYMPH-VASCULAR INVASION: Not identified  ANCILLARY STUDIES (performed on patient's previous biopsy MS ):  ER: Positive (95% of tumor cells)  MO: Negative (less than 1 % of tumor cells)  Her2 by FISH: Negative (not amplified)  Ki-67%: Positive (70 % of tumor cells)  ADDITIONAL FINDINGS: Apocrine metaplasia, usual ductal hyperplasia, fibroadenomatoid change  PATHOLOGIC STAGE CLASSIFICATION: pT2 pN0     Oncotype type DX recurrence score 22, with distant recurrence risk 8%, absolute chemotherapy benefit <1%.    Currently receiving adjuvant radiation.  Presents with right breast tenderness.     Malignant neoplasm of upper-outer quadrant of right breast in female, estrogen receptor positive    3/14/2019 Initial Diagnosis     Malignant neoplasm of upper-outer quadrant of right breast in female, estrogen receptor positive         3/27/2019 Cancer Staged     Staging form: Breast, AJCC 8th Edition  - Pathologic stage from 3/27/2019: Stage IIA (pT2, pN0(sn), cM0, G2, ER+, MO-, HER2-) - Signed by Guido Huynh MD on 4/4/2019          Past Medical History:   Diagnosis Date    Encounter for blood transfusion     Hypertension     Malignant neoplasm of upper-outer quadrant of right breast in female, estrogen receptor positive 3/14/2019     Family History   Problem Relation Age of Onset    Diabetes Maternal Grandmother      Diabetes Maternal Grandfather     Diabetes Mother     Breast cancer Neg Hx     Colon cancer Neg Hx     Ovarian cancer Neg Hx      Social History     Socioeconomic History    Marital status:      Spouse name: Not on file    Number of children: Not on file    Years of education: Not on file    Highest education level: Not on file   Occupational History    Not on file   Social Needs    Financial resource strain: Not on file    Food insecurity:     Worry: Not on file     Inability: Not on file    Transportation needs:     Medical: Not on file     Non-medical: Not on file   Tobacco Use    Smoking status: Never Smoker    Smokeless tobacco: Never Used   Substance and Sexual Activity    Alcohol use: No    Drug use: No    Sexual activity: Yes     Partners: Male     Birth control/protection: Post-menopausal   Lifestyle    Physical activity:     Days per week: Not on file     Minutes per session: Not on file    Stress: Not on file   Relationships    Social connections:     Talks on phone: Not on file     Gets together: Not on file     Attends Congregational service: Not on file     Active member of club or organization: Not on file     Attends meetings of clubs or organizations: Not on file     Relationship status: Not on file   Other Topics Concern    Not on file   Social History Narrative    Not on file     Past Surgical History:   Procedure Laterality Date    APPENDECTOMY      BIOPSY, LYMPH NODE, SENTINEL Right 3/27/2019    Performed by [facility] Starks [facility] at [facility]     SECTION      x 1    LUMPECTOMY, BREAST Right 3/27/2019    Performed by [facility]arks [facility] at [facility]    OOPHORECTOMY      right      Current Outpatient Medications   Medication Sig Dispense Refill    anastrozole (ARIMIDEX) 1 mg Tab Take 1 tablet (1 mg total) by mouth once daily. 30 tablet 11    aspirin (ECOTRIN) 81 MG EC tablet Take 1 tablet (81 mg total) by mouth once daily.  0    atorvastatin (LIPITOR) 40 MG  tablet Take 1 tablet (40 mg total) by mouth once daily. 90 tablet 3    blood pressure kit med and lrg Kit Take blood pressure first thing in the morning. 1 each 0    butalbital-acetaminophen-caff -40 mg -40 mg Cap Take 1 tablet by mouth 3 (three) times daily. 30 capsule 0    butalbital-acetaminophen-caffeine -40 mg (FIORICET, ESGIC) -40 mg per tablet Take 1 tablet by mouth every 4 (four) hours as needed for Pain. 15 tablet 0    cholecalciferol, vitamin D3, 50,000 unit capsule Take 50,000 Units by mouth once a week.   0    cloNIDine (CATAPRES) 0.1 MG tablet Take 1 tablet (0.1 mg total) by mouth 3 (three) times daily. 90 tablet 1    diazePAM (VALIUM) 10 MG Tab Take 1 tablet (10 mg total) by mouth 2 (two) times daily. 30 tablet 1    emollient combination no.63 (MIADERM) Lotn Apply 1 application topically 3 (three) times daily.      gabapentin (NEURONTIN) 300 MG capsule Take 1 capsule (300 mg total) by mouth 3 (three) times daily. One capsule on the 1st day, 2 capsules on the 2nd day and then t.i.d. 90 capsule 6    hydroCHLOROthiazide (MICROZIDE) 12.5 mg capsule Take 1 capsule (12.5 mg total) by mouth once daily. 30 capsule 8    losartan (COZAAR) 50 MG tablet Take 1 tablet (50 mg total) by mouth once daily. 90 tablet 3    nitrofurantoin (MACRODANTIN) 100 MG capsule Take 1 capsule (100 mg total) by mouth 2 (two) times daily. for 7 days 14 capsule 0    nitrofurantoin, macrocrystal-monohydrate, (MACROBID) 100 MG capsule Take 1 capsule (100 mg total) by mouth 2 (two) times daily. for 7 days 14 capsule 0    oxyCODONE-acetaminophen (PERCOCET) 7.5-325 mg per tablet Take 1 tablet by mouth every 6 (six) hours as needed for Pain. 30 tablet 0    propranolol (INDERAL) 40 MG tablet Take 1 tablet (40 mg total) by mouth 2 (two) times daily. 60 tablet 11    morphine (MSIR) 15 MG tablet Take 1 tablet (15 mg total) by mouth every 6 (six) hours as needed for Pain. 20 tablet 0    traMADol (ULTRAM) 50  mg tablet Take 1 tablet (50 mg total) by mouth every 6 (six) hours as needed for Pain. 50 tablet 0     No current facility-administered medications for this visit.        Labs:  Lab Results   Component Value Date    WBC 8.80 06/03/2019    HGB 10.5 (L) 06/03/2019    HCT 33.9 (L) 06/03/2019    MCV 79 (L) 06/03/2019     06/03/2019     BMP  Lab Results   Component Value Date     06/03/2019    K 4.4 06/03/2019     06/03/2019    CO2 25 06/03/2019    BUN 13 06/03/2019    CREATININE 1.2 06/03/2019    CALCIUM 9.3 06/03/2019    ANIONGAP 11 06/03/2019    ESTGFRAFRICA 54 (A) 06/03/2019    EGFRNONAA 47 (A) 06/03/2019     Lab Results   Component Value Date    ALT 13 06/03/2019    AST 17 06/03/2019    ALKPHOS 127 06/03/2019    BILITOT 0.2 06/03/2019       No results found for: IRON, TIBC, FERRITIN, SATURATEDIRO  No results found for: [id number]  No results found for: FOLATE  Lab Results   Component Value Date    TSH 0.799 05/18/2019       I have reviewed the radiology reports and examined the scan/xray images.    Review of Systems   Constitutional: Negative.    HENT: Negative.    Eyes: Negative.    Respiratory: Negative.    Cardiovascular: Negative.    Gastrointestinal: Negative.    Endocrine: Negative.    Genitourinary: Negative.    Musculoskeletal: Negative.    Skin: Negative.    Allergic/Immunologic: Negative.    Neurological: Negative.    Hematological: Negative.    Psychiatric/Behavioral: Negative.      ECOG SCORE              Objective:     Vitals:    06/06/19 1127   BP: 127/82   Pulse: 64   Resp: 18   Temp: 97.5 °F (36.4 °C)   Body mass index is 49.32 kg/m².  Physical Exam   Constitutional: She is oriented to person, place, and time. She appears well-developed and well-nourished.   HENT:   Head: Normocephalic and atraumatic.   Eyes: Conjunctivae and EOM are normal.   Neck: Normal range of motion. Neck supple.   Cardiovascular: Normal rate and regular rhythm.   Pulmonary/Chest: Effort normal and breath  sounds normal.   Abdominal: Soft. Bowel sounds are normal.   Musculoskeletal: Normal range of motion.   Neurological: She is alert and oriented to person, place, and time.   Skin: Skin is warm and dry.   Psychiatric: She has a normal mood and affect. Her behavior is normal. Judgment and thought content normal.   Nursing note and vitals reviewed.        Assessment:      1. Malignant neoplasm of upper-outer quadrant of right breast in female, estrogen receptor positive           Plan:     Malignant neoplasm of upper-outer quadrant of right breast in female, estrogen receptor positive    Continue adjuvant radiation  Start anastrozole after radiation    Right breast pain  No evidence of infection  Apply eucerin lotion to right breast.  Advil once daily prn for inflammation.    -     traMADol (ULTRAM) 50 mg tablet; Take 1 tablet (50 mg total) by mouth every 6 (six) hours as needed for Pain.  Dispense: 50 tablet; Refill: 0  -     morphine (MSIR) 15 MG tablet; Take 1 tablet (15 mg total) by mouth every 6 (six) hours as needed for Pain.  Dispense: 20 tablet; Refill: 0

## 2019-06-06 NOTE — TELEPHONE ENCOUNTER
----- Message from Kayleigh Gonzales sent at 6/6/2019  9:01 AM CDT -----  [facility]  Requesting a call back regarding patient. She states the patient was referred from hospital and has aphasia. She is requesting speech therapy evaluation in home. Please call back at [phone]

## 2019-06-06 NOTE — TELEPHONE ENCOUNTER
I returned a call to Ms. Elana in regards to Ms. Cristobal, MsSaray Elana was calling to inquire about getting an external or verbal order from Dr. Rojas for speech therapy and physical therapy for Ms. Cristobal due to asphasia and risk for falls. I informed her I would forward the information over to Dr. Rojas. She thanked me and ended call. //BJ

## 2019-06-07 ENCOUNTER — DOCUMENTATION ONLY (OUTPATIENT)
Dept: HEMATOLOGY/ONCOLOGY | Facility: CLINIC | Age: 69
End: 2019-06-07

## 2019-06-07 PROCEDURE — 77387 PR GUIDANCE FOR RADIATION TREATMENT DELIVERY: ICD-10-PCS | Mod: 26,,, | Performed by: RADIOLOGY

## 2019-06-07 PROCEDURE — 77387 GUIDANCE FOR RADJ TX DLVR: CPT | Mod: TC | Performed by: RADIOLOGY

## 2019-06-07 PROCEDURE — 77387 GUIDANCE FOR RADJ TX DLVR: CPT | Mod: 26,,, | Performed by: RADIOLOGY

## 2019-06-07 PROCEDURE — 77412 RADIATION TX DELIVERY LVL 3: CPT | Performed by: RADIOLOGY

## 2019-06-07 NOTE — PROGRESS NOTES
SW briefly met with pt today after receiving radiation. Pt stated that she is doing much better than when she first met SW. Pt stated that she and her  worked out a lot of their issues during their celebration of their anniversary. Pt stated that she and her  were both scared and needed to be vulnerable with one another to become stronger as a team. SW will continue to provide general support to pt.    F/u as needs arise.

## 2019-06-07 NOTE — TELEPHONE ENCOUNTER
I spoke with Ms. Shelbie informing her that the request for physical therapy and speech therapy was approved by Dr. Rojas. //GLENN

## 2019-06-10 ENCOUNTER — OFFICE VISIT (OUTPATIENT)
Dept: INTERNAL MEDICINE | Facility: CLINIC | Age: 69
End: 2019-06-10
Payer: MEDICARE

## 2019-06-10 VITALS
SYSTOLIC BLOOD PRESSURE: 126 MMHG | TEMPERATURE: 97 F | BODY MASS INDEX: 49.65 KG/M2 | RESPIRATION RATE: 20 BRPM | DIASTOLIC BLOOD PRESSURE: 86 MMHG | HEART RATE: 60 BPM | WEIGHT: 293 LBS

## 2019-06-10 DIAGNOSIS — R06.89 DYSPNEA AND RESPIRATORY ABNORMALITIES: ICD-10-CM

## 2019-06-10 DIAGNOSIS — M79.89 LOCALIZED SWELLING OF LOWER EXTREMITY: Primary | ICD-10-CM

## 2019-06-10 DIAGNOSIS — R06.00 DYSPNEA AND RESPIRATORY ABNORMALITIES: ICD-10-CM

## 2019-06-10 DIAGNOSIS — E66.01 CLASS 3 SEVERE OBESITY DUE TO EXCESS CALORIES WITH SERIOUS COMORBIDITY AND BODY MASS INDEX (BMI) OF 50.0 TO 59.9 IN ADULT: ICD-10-CM

## 2019-06-10 PROCEDURE — 3074F SYST BP LT 130 MM HG: CPT | Mod: CPTII,S$GLB,, | Performed by: FAMILY MEDICINE

## 2019-06-10 PROCEDURE — 99999 PR PBB SHADOW E&M-EST. PATIENT-LVL III: ICD-10-PCS | Mod: PBBFAC,,, | Performed by: FAMILY MEDICINE

## 2019-06-10 PROCEDURE — 3079F PR MOST RECENT DIASTOLIC BLOOD PRESSURE 80-89 MM HG: ICD-10-PCS | Mod: CPTII,S$GLB,, | Performed by: FAMILY MEDICINE

## 2019-06-10 PROCEDURE — 1101F PT FALLS ASSESS-DOCD LE1/YR: CPT | Mod: CPTII,S$GLB,, | Performed by: FAMILY MEDICINE

## 2019-06-10 PROCEDURE — 77387 PR GUIDANCE FOR RADIATION TREATMENT DELIVERY: ICD-10-PCS | Mod: 26,,, | Performed by: RADIOLOGY

## 2019-06-10 PROCEDURE — 77387 GUIDANCE FOR RADJ TX DLVR: CPT | Mod: 26,,, | Performed by: RADIOLOGY

## 2019-06-10 PROCEDURE — 99214 OFFICE O/P EST MOD 30 MIN: CPT | Mod: S$GLB,,, | Performed by: FAMILY MEDICINE

## 2019-06-10 PROCEDURE — 3074F PR MOST RECENT SYSTOLIC BLOOD PRESSURE < 130 MM HG: ICD-10-PCS | Mod: CPTII,S$GLB,, | Performed by: FAMILY MEDICINE

## 2019-06-10 PROCEDURE — 77412 RADIATION TX DELIVERY LVL 3: CPT | Performed by: RADIOLOGY

## 2019-06-10 PROCEDURE — 77387 GUIDANCE FOR RADJ TX DLVR: CPT | Mod: TC | Performed by: RADIOLOGY

## 2019-06-10 PROCEDURE — 99999 PR PBB SHADOW E&M-EST. PATIENT-LVL III: CPT | Mod: PBBFAC,,, | Performed by: FAMILY MEDICINE

## 2019-06-10 PROCEDURE — 3079F DIAST BP 80-89 MM HG: CPT | Mod: CPTII,S$GLB,, | Performed by: FAMILY MEDICINE

## 2019-06-10 PROCEDURE — 1101F PR PT FALLS ASSESS DOC 0-1 FALLS W/OUT INJ PAST YR: ICD-10-PCS | Mod: CPTII,S$GLB,, | Performed by: FAMILY MEDICINE

## 2019-06-10 PROCEDURE — 99214 PR OFFICE/OUTPT VISIT, EST, LEVL IV, 30-39 MIN: ICD-10-PCS | Mod: S$GLB,,, | Performed by: FAMILY MEDICINE

## 2019-06-10 RX ORDER — FUROSEMIDE 20 MG/1
TABLET ORAL
Qty: 30 TABLET | Refills: 0 | Status: SHIPPED | OUTPATIENT
Start: 2019-06-10 | End: 2019-08-04 | Stop reason: SDUPTHER

## 2019-06-10 RX ORDER — CLONIDINE HYDROCHLORIDE 0.1 MG/1
0.1 TABLET ORAL 3 TIMES DAILY
Qty: 90 TABLET | Refills: 1 | Status: SHIPPED | OUTPATIENT
Start: 2019-06-10 | End: 2019-09-10 | Stop reason: SDUPTHER

## 2019-06-10 NOTE — PROGRESS NOTES
Subjective:       Patient ID: Susu Luther is a 69 y.o. female.    Chief Complaint: Follow-up (6 month follow-up); Medication Refill (clonidine (and others per patient)); and Aspirin Question    Pt is a 69 year old her with bilateral lower extremity swelling. Pt cardiac is negative work up. Has had US of legs in march. Pt likely has Sleep apnea but not able to follow-up with Pulmonary. Discussed with pt weight may have play in her swelling as well as diet    Review of Systems   Constitutional: Negative.    Respiratory: Negative.    Cardiovascular: Positive for leg swelling.   Genitourinary: Negative.    Hematological: Negative.    Psychiatric/Behavioral: Negative.        Objective:      Physical Exam   Constitutional: She is oriented to person, place, and time. She appears well-developed and well-nourished.   Cardiovascular: Normal rate and regular rhythm. Exam reveals no friction rub.   No murmur heard.  Bilateral leg swelling   Pulmonary/Chest: Effort normal and breath sounds normal. No stridor. She has no wheezes.   Abdominal: Soft. Bowel sounds are normal. There is no tenderness. There is no guarding.   Neurological: She is alert and oriented to person, place, and time.   Skin: Skin is warm and dry.       Assessment:       1. Localized swelling of lower extremity    2. Dyspnea and respiratory abnormalities    3. Class 3 severe obesity due to excess calories with serious comorbidity and body mass index (BMI) of 50.0 to 59.9 in adult        Plan:       Localized swelling of lower extremity  Comments:  Will do US of lower extremity. Pt can do lasix 20 mg 1/2  Orders:  -     US Lower Extremity Veins Bilateral; Future; Expected date: 06/10/2019    Dyspnea and respiratory abnormalities  Comments:  Pt needs follow-up with Pulmonary    Class 3 severe obesity due to excess calories with serious comorbidity and body mass index (BMI) of 50.0 to 59.9 in adult  Comments:  Encourage pt to loose weight    Other  orders  -     furosemide (LASIX) 20 MG tablet; 1/2 tab a day.  Dispense: 30 tablet; Refill: 0  -     cloNIDine (CATAPRES) 0.1 MG tablet; Take 1 tablet (0.1 mg total) by mouth 3 (three) times daily.  Dispense: 90 tablet; Refill: 1

## 2019-06-11 ENCOUNTER — TELEPHONE (OUTPATIENT)
Dept: INTERNAL MEDICINE | Facility: CLINIC | Age: 69
End: 2019-06-11

## 2019-06-11 ENCOUNTER — DOCUMENTATION ONLY (OUTPATIENT)
Dept: RADIATION ONCOLOGY | Facility: CLINIC | Age: 69
End: 2019-06-11

## 2019-06-11 PROCEDURE — 77387 GUIDANCE FOR RADJ TX DLVR: CPT | Mod: 26,,, | Performed by: RADIOLOGY

## 2019-06-11 PROCEDURE — 77387 GUIDANCE FOR RADJ TX DLVR: CPT | Mod: TC | Performed by: RADIOLOGY

## 2019-06-11 PROCEDURE — 77387 PR GUIDANCE FOR RADIATION TREATMENT DELIVERY: ICD-10-PCS | Mod: 26,,, | Performed by: RADIOLOGY

## 2019-06-11 PROCEDURE — 77412 RADIATION TX DELIVERY LVL 3: CPT | Performed by: RADIOLOGY

## 2019-06-11 NOTE — TELEPHONE ENCOUNTER
"    You  Kennedy Rojas MD Just now (5:14 PM)      Please see message. Is "US Lower Extremity Veins Bilateral" medically urgent?    Routing comment      "

## 2019-06-12 ENCOUNTER — TELEPHONE (OUTPATIENT)
Dept: PULMONOLOGY | Facility: CLINIC | Age: 69
End: 2019-06-12

## 2019-06-12 PROCEDURE — 77387 PR GUIDANCE FOR RADIATION TREATMENT DELIVERY: ICD-10-PCS | Mod: 26,,, | Performed by: RADIOLOGY

## 2019-06-12 PROCEDURE — 77412 RADIATION TX DELIVERY LVL 3: CPT | Performed by: RADIOLOGY

## 2019-06-12 PROCEDURE — 77387 GUIDANCE FOR RADJ TX DLVR: CPT | Mod: TC | Performed by: RADIOLOGY

## 2019-06-12 PROCEDURE — 77387 GUIDANCE FOR RADJ TX DLVR: CPT | Mod: 26,,, | Performed by: RADIOLOGY

## 2019-06-12 NOTE — TELEPHONE ENCOUNTER
Kennedy Rojas MD  You 14 hours ago (4:22 AM)      It has been a long standing policy of mine to not comment on this. I have told financial before, everything I order is medically needed. So I ordered the test. [facility] needs to decide if want to have pt continue with care or not.     Routing comment

## 2019-06-12 NOTE — PLAN OF CARE
Problem: Adult Inpatient Plan of Care  Goal: Plan of Care Review  Outcome: Ongoing (interventions implemented as appropriate)  Day 15 outpatient xrt to right breast. Brisk erythema, tenderness, dry cracked nipple, no bleeding or discharge from nipple. Continue tramadol.Reviewed skin care, using miaderm cream and gel packs. Will continue to monitor.

## 2019-06-13 PROCEDURE — 77387 PR GUIDANCE FOR RADIATION TREATMENT DELIVERY: ICD-10-PCS | Mod: 26,,, | Performed by: RADIOLOGY

## 2019-06-13 PROCEDURE — 77336 RADIATION PHYSICS CONSULT: CPT | Performed by: RADIOLOGY

## 2019-06-13 PROCEDURE — 77412 RADIATION TX DELIVERY LVL 3: CPT | Performed by: RADIOLOGY

## 2019-06-13 PROCEDURE — 77417 THER RADIOLOGY PORT IMAGE(S): CPT | Performed by: RADIOLOGY

## 2019-06-13 PROCEDURE — 77387 GUIDANCE FOR RADJ TX DLVR: CPT | Mod: TC | Performed by: RADIOLOGY

## 2019-06-13 PROCEDURE — 77387 GUIDANCE FOR RADJ TX DLVR: CPT | Mod: 26,,, | Performed by: RADIOLOGY

## 2019-06-13 NOTE — TELEPHONE ENCOUNTER
----- Message from Ayesha Espinoza sent at 6/13/2019 12:37 PM CDT -----  Contact: pt   Pt stated she needed a refill on medication wasn't sure of the name, she can be reached at   [phone]          [facility] [address] [place] [address]  Phone: [phone] Fax: [phone]

## 2019-06-13 NOTE — TELEPHONE ENCOUNTER
I s/w pt in regards to medication request and informed her that I would send her request over to Dr. Rojas. //GLENN

## 2019-06-13 NOTE — TELEPHONE ENCOUNTER
Informed pt that the medication requests were already sent to Dr. Rojas and we are waiting on him to sign them. Pt verbalized understanding.

## 2019-06-14 PROCEDURE — 77387 GUIDANCE FOR RADJ TX DLVR: CPT | Mod: 26,,, | Performed by: RADIOLOGY

## 2019-06-14 PROCEDURE — 77412 RADIATION TX DELIVERY LVL 3: CPT | Performed by: RADIOLOGY

## 2019-06-14 PROCEDURE — 77387 PR GUIDANCE FOR RADIATION TREATMENT DELIVERY: ICD-10-PCS | Mod: 26,,, | Performed by: RADIOLOGY

## 2019-06-14 PROCEDURE — 77387 GUIDANCE FOR RADJ TX DLVR: CPT | Mod: TC | Performed by: RADIOLOGY

## 2019-06-14 RX ORDER — DIAZEPAM 10 MG/1
10 TABLET ORAL 2 TIMES DAILY
Qty: 30 TABLET | Refills: 1 | Status: SHIPPED | OUTPATIENT
Start: 2019-06-14 | End: 2019-08-23 | Stop reason: SDUPTHER

## 2019-06-14 RX ORDER — BUTALBITAL, ACETAMINOPHEN AND CAFFEINE 50; 325; 40 MG/1; MG/1; MG/1
1 CAPSULE ORAL 3 TIMES DAILY
Qty: 30 CAPSULE | Refills: 0 | Status: SHIPPED | OUTPATIENT
Start: 2019-06-14 | End: 2019-09-10 | Stop reason: SDUPTHER

## 2019-06-17 ENCOUNTER — DOCUMENTATION ONLY (OUTPATIENT)
Dept: HEMATOLOGY/ONCOLOGY | Facility: CLINIC | Age: 69
End: 2019-06-17

## 2019-06-17 PROCEDURE — 77412 RADIATION TX DELIVERY LVL 3: CPT | Performed by: RADIOLOGY

## 2019-06-17 PROCEDURE — 77387 PR GUIDANCE FOR RADIATION TREATMENT DELIVERY: ICD-10-PCS | Mod: 26,,, | Performed by: RADIOLOGY

## 2019-06-17 PROCEDURE — 77387 GUIDANCE FOR RADJ TX DLVR: CPT | Mod: 26,,, | Performed by: RADIOLOGY

## 2019-06-17 PROCEDURE — 77387 GUIDANCE FOR RADJ TX DLVR: CPT | Mod: TC | Performed by: RADIOLOGY

## 2019-06-17 NOTE — PROGRESS NOTES
SW briefly met with pt to discuss her radiation treatments. Pt stated that she is doing much better. Pt stated that her last radiation treatment is on tmrw. SW told pt that she would be present to watch her ring the bell. TIM observed interaction between pt and her spouse which has improved tremendously since their first encounter with TIM.     F/u on tmrw

## 2019-06-18 ENCOUNTER — DOCUMENTATION ONLY (OUTPATIENT)
Dept: RADIATION ONCOLOGY | Facility: CLINIC | Age: 69
End: 2019-06-18

## 2019-06-18 PROCEDURE — 77387 GUIDANCE FOR RADJ TX DLVR: CPT | Mod: TC | Performed by: RADIOLOGY

## 2019-06-18 PROCEDURE — 77387 PR GUIDANCE FOR RADIATION TREATMENT DELIVERY: ICD-10-PCS | Mod: 26,,, | Performed by: RADIOLOGY

## 2019-06-18 PROCEDURE — 77412 RADIATION TX DELIVERY LVL 3: CPT | Performed by: RADIOLOGY

## 2019-06-18 PROCEDURE — 77387 GUIDANCE FOR RADJ TX DLVR: CPT | Mod: 26,,, | Performed by: RADIOLOGY

## 2019-06-18 NOTE — PLAN OF CARE
Problem: Adult Inpatient Plan of Care  Goal: Plan of Care Review  Outcome: Outcome(s) achieved Date Met: 06/18/19  Completed xrt to breast today 6-18-19. Will make f/u appt.

## 2019-06-19 ENCOUNTER — TELEPHONE (OUTPATIENT)
Dept: HEMATOLOGY/ONCOLOGY | Facility: CLINIC | Age: 69
End: 2019-06-19

## 2019-06-19 ENCOUNTER — TELEPHONE (OUTPATIENT)
Dept: RADIATION ONCOLOGY | Facility: CLINIC | Age: 69
End: 2019-06-19

## 2019-06-19 NOTE — TELEPHONE ENCOUNTER
Spoke with patient who stated she had pain in her breast, BP elevated and had not taken any pain meds. Advised patient to take pain meds, reviewed skin care and to call back if pain is not resolved or go to ER  For further evaluation. Patient verbalized understanding.

## 2019-06-19 NOTE — TELEPHONE ENCOUNTER
----- Message from Guido Huynh MD sent at 6/18/2019  5:26 PM CDT -----  Contact: PATIENT  She can start day after done with radiation    ----- Message -----  From: Lupe Bass LPN  Sent: 6/18/2019   3:47 PM  To: Guido Huynh MD    When does she need to start? I will call her and let her know.    Fam  ----- Message -----  From: Sujey Jerry  Sent: 6/18/2019   3:16 PM  To: Amina Lora Staff    STATES TODAY IS HER LAST DAY OF RADIATION AND SHE IS TO START ON SOME PILLS THAT SHE ALREADY HAVE. WHEN DO SHE START TAKING THEM. PLEASE CALL PATIENT @ [phone] THANK[place]

## 2019-06-19 NOTE — TELEPHONE ENCOUNTER
Spoke to patient this morning and instructed her to begin anastrozole this morning. Pt verbalized understanding. Pt also stated she was having an increase in pain this morning. I informed pt that I would send a message to Dr. Arreguin's nurse for a call back.

## 2019-06-20 ENCOUNTER — DOCUMENTATION ONLY (OUTPATIENT)
Dept: HEMATOLOGY/ONCOLOGY | Facility: CLINIC | Age: 69
End: 2019-06-20

## 2019-06-20 PROCEDURE — 77336 RADIATION PHYSICS CONSULT: CPT | Performed by: RADIOLOGY

## 2019-06-20 NOTE — PROGRESS NOTES
TIM was able to observe pt ring the bell to celebrate finishing radiation treatments. Pt was present with her  as well to celebrate. The family thanked TIM and Nurse Maldonado for assistance throughout treatment.     Pt and spouse were encouraged to contact us if needs arise.

## 2019-06-21 ENCOUNTER — TELEPHONE (OUTPATIENT)
Dept: PHARMACY | Facility: CLINIC | Age: 69
End: 2019-06-21

## 2019-06-21 DIAGNOSIS — Z17.0 MALIGNANT NEOPLASM OF UPPER-OUTER QUADRANT OF RIGHT BREAST IN FEMALE, ESTROGEN RECEPTOR POSITIVE: Primary | Chronic | ICD-10-CM

## 2019-06-21 DIAGNOSIS — C50.411 MALIGNANT NEOPLASM OF UPPER-OUTER QUADRANT OF RIGHT BREAST IN FEMALE, ESTROGEN RECEPTOR POSITIVE: Primary | Chronic | ICD-10-CM

## 2019-06-21 RX ORDER — ONDANSETRON 8 MG/1
8 TABLET, ORALLY DISINTEGRATING ORAL EVERY 8 HOURS PRN
Qty: 60 TABLET | Refills: 1 | Status: SHIPPED | OUTPATIENT
Start: 2019-06-21 | End: 2020-06-20

## 2019-06-21 RX ORDER — PROCHLORPERAZINE MALEATE 10 MG
10 TABLET ORAL 4 TIMES DAILY PRN
Qty: 60 TABLET | Refills: 1 | Status: SHIPPED | OUTPATIENT
Start: 2019-06-21 | End: 2020-06-20

## 2019-06-21 NOTE — TELEPHONE ENCOUNTER
"Called patient in reference to a request from [facility] in [place], [place] to transfer patient's anastrazole.  During the call she reported multiple side effects documented below:    -Diarrhea - patient has had diarrhea since Wednesday and although is has slightly improved, nothing is helping reduce the frequency.  She reports having diarrhea more than four times yesterday and that "I had diarrhea every time I went to the bathroom".  She has tried Imodium, Pepto and one other medication that she could not remember the name of.  She is unable to keep food down but is trying to replenish fluids with water and gatorade.      -Nausea - she is extremely nauseated and unable to keep any food down.  She does not recall when she was last able to eat.  She does not currently have any antiemetic on hand.      -Dizziness - she reports being so dizzy that she is unable to get up and walk around.  It increases in severity with movement and she is afraid she may fall.  Noted the dizziness could be due to the extreme loss of fluids from diarrhea which may lead to weakness and dizziness.      Was able to discuss via secure chat directly with Dr. Huynh.  He will send a prescription over to [facility] for ondansetron and prochlorperazine.  Advised that we direct Ms. Luther to go to the ER tomorrow should her symptoms persist and the diarrhea continues as we are worried about dehydration.      Discussed the above with Ms. Luther.  She is aware of the prescriptions for antiemetics and will pick them up this evening and go to ER tomorrow if she does not improve.  We will follow up with her on Monday 6/24/19.  "

## 2019-06-27 ENCOUNTER — TELEPHONE (OUTPATIENT)
Dept: PHARMACY | Facility: CLINIC | Age: 69
End: 2019-06-27

## 2019-06-27 NOTE — TELEPHONE ENCOUNTER
7 day post start touchbase: Spoke with Ms. Luther - she reports she started taking anastrozole on 6/19. She has been feeling better since [facility] last call.    Diarrhea - resolved    Nausea - well controlled with zofran and compazine. Patient scheduled anti emetics TID    Dizziness - ongoing. Has not fallen or passed out. Advised her to make an appt with PCP - she will call tomorrow morning. She will also discuss during her appt with Dr. Huynh on 7/9.    She would like to keep her anastrozole at [facility] (not at [facility]) b/c she likes the monthly f/u. Will have [facility] call for next refill.

## 2019-06-28 ENCOUNTER — OFFICE VISIT (OUTPATIENT)
Dept: PULMONOLOGY | Facility: CLINIC | Age: 69
End: 2019-06-28
Payer: MEDICARE

## 2019-06-28 VITALS
BODY MASS INDEX: 43.4 KG/M2 | HEART RATE: 75 BPM | OXYGEN SATURATION: 98 % | SYSTOLIC BLOOD PRESSURE: 130 MMHG | RESPIRATION RATE: 18 BRPM | HEIGHT: 69 IN | DIASTOLIC BLOOD PRESSURE: 80 MMHG | WEIGHT: 293 LBS

## 2019-06-28 DIAGNOSIS — R53.83 FEELING TIRED: ICD-10-CM

## 2019-06-28 DIAGNOSIS — R06.83 SNORING: ICD-10-CM

## 2019-06-28 DIAGNOSIS — E66.01 MORBID OBESITY WITH BMI OF 45.0-49.9, ADULT: ICD-10-CM

## 2019-06-28 DIAGNOSIS — G47.30 SLEEP-DISORDERED BREATHING: ICD-10-CM

## 2019-06-28 DIAGNOSIS — I63.00 CEREBROVASCULAR ACCIDENT (CVA) DUE TO THROMBOSIS OF PRECEREBRAL ARTERY: ICD-10-CM

## 2019-06-28 DIAGNOSIS — I10 ESSENTIAL HYPERTENSION: ICD-10-CM

## 2019-06-28 DIAGNOSIS — R06.02 SHORTNESS OF BREATH ON EXERTION: Primary | ICD-10-CM

## 2019-06-28 PROBLEM — E66.813 CLASS 3 SEVERE OBESITY DUE TO EXCESS CALORIES WITH SERIOUS COMORBIDITY AND BODY MASS INDEX (BMI) OF 50.0 TO 59.9 IN ADULT: Status: RESOLVED | Noted: 2018-11-28 | Resolved: 2019-06-28

## 2019-06-28 PROCEDURE — 1101F PT FALLS ASSESS-DOCD LE1/YR: CPT | Mod: CPTII,S$GLB,, | Performed by: NURSE PRACTITIONER

## 2019-06-28 PROCEDURE — 3079F PR MOST RECENT DIASTOLIC BLOOD PRESSURE 80-89 MM HG: ICD-10-PCS | Mod: CPTII,S$GLB,, | Performed by: NURSE PRACTITIONER

## 2019-06-28 PROCEDURE — 99999 PR PBB SHADOW E&M-EST. PATIENT-LVL IV: CPT | Mod: PBBFAC,,, | Performed by: NURSE PRACTITIONER

## 2019-06-28 PROCEDURE — 99214 PR OFFICE/OUTPT VISIT, EST, LEVL IV, 30-39 MIN: ICD-10-PCS | Mod: S$GLB,,, | Performed by: NURSE PRACTITIONER

## 2019-06-28 PROCEDURE — 3075F SYST BP GE 130 - 139MM HG: CPT | Mod: CPTII,S$GLB,, | Performed by: NURSE PRACTITIONER

## 2019-06-28 PROCEDURE — 3079F DIAST BP 80-89 MM HG: CPT | Mod: CPTII,S$GLB,, | Performed by: NURSE PRACTITIONER

## 2019-06-28 PROCEDURE — 99214 OFFICE O/P EST MOD 30 MIN: CPT | Mod: S$GLB,,, | Performed by: NURSE PRACTITIONER

## 2019-06-28 PROCEDURE — 99999 PR PBB SHADOW E&M-EST. PATIENT-LVL IV: ICD-10-PCS | Mod: PBBFAC,,, | Performed by: NURSE PRACTITIONER

## 2019-06-28 PROCEDURE — 1101F PR PT FALLS ASSESS DOC 0-1 FALLS W/OUT INJ PAST YR: ICD-10-PCS | Mod: CPTII,S$GLB,, | Performed by: NURSE PRACTITIONER

## 2019-06-28 PROCEDURE — 3075F PR MOST RECENT SYSTOLIC BLOOD PRESS GE 130-139MM HG: ICD-10-PCS | Mod: CPTII,S$GLB,, | Performed by: NURSE PRACTITIONER

## 2019-06-28 RX ORDER — ALBUTEROL SULFATE 90 UG/1
2 AEROSOL, METERED RESPIRATORY (INHALATION) EVERY 4 HOURS PRN
Qty: 18 G | Refills: 11 | Status: SHIPPED | OUTPATIENT
Start: 2019-06-28 | End: 2020-11-05 | Stop reason: SDUPTHER

## 2019-06-28 NOTE — LETTER
June 28, 2019      Kennedy Rojas MD  [address]  [place] [address]           [facility]  [address] [place] [id number]  Phone: [phone]  Fax: [phone]          Patient: Susu Luther   MR Number: [id number]   YOB: 1950   Date of Visit: 6/28/2019       Dear Dr. Kennedy Rojas:    Thank you for referring Susu Luther to me for evaluation. Attached you will find relevant portions of my assessment and plan of care.    If you have questions, please do not hesitate to call me. I look forward to following Susu Luther along with you.    Sincerely,    Blank Cortez, NP    Enclosure  CC:  No Recipients    If you would like to receive this communication electronically, please contact [email or web address] or [phone] to request more information on [employer] [employer] access.    For providers and/or their staff who would like to refer a patient to [facility], please contact us through our one-stop-shop provider referral line, [facility], at [phone].    If you feel you have received this communication in error or would no longer like to receive these types of communications, please e-mail [email or web address]

## 2019-06-28 NOTE — PROGRESS NOTES
Subjective:      Patient ID: Susu Luther is a 69 y.o. female.    Patient Active Problem List   Diagnosis    Shortness of breath on exertion    NILS (obstructive sleep apnea)    Malignant neoplasm of upper-outer quadrant of right breast in female, estrogen receptor positive    Hypertension    Stroke    Morbid obesity with BMI of 45.0-49.9, adult     she has been referred by Kennedy Rojas MD for evaluation and management for   Chief Complaint   Patient presents with    Sleep Apnea    Shortness of Breath     Chief Complaint: Sleep Apnea and Shortness of Breath    HPI:  Susu Luther is a 69 y.o. female presents to pulmonary clinic initial evaluation related to shortness of breath with exertion since early 2018.   She was seen by Dr. Jhaveri 11/28/2018 for presenting compliants today, obstructive sleep apnea shortness of breath with exertion.  She is referred back to pulmonary by her primary care provider, Dr. KEESHA Rojas for reevaluation of shortness of breath and obstructive sleep apnea.   Home Sleep Study ordered by Dr. Jhaveri, never completed, patient states was never scheduled.     No history of asthma or COPD.  Never smoker  No occupational exposure to fumes or chemicals.    Dyspnea Characteristics:   Exertional Dyspnea   Dyspnea Duration:  Chronic 1.5 years   Dyspnea Severity:  TAYLOR 5, severe  Dyspnea Timing:  Exertional.  With walking 10 ft or greater  Dyspnea Contributing Factors:  Morbid obesity, BMI 48    Dyspnea Associated Symptoms:  none. There is no cough, no wheezing, no sputum production, no weight loss, no chest pain.      Modified Taylor Dyspnea Scale      0  Nothing at all    0.5  Very, very slight (just noticeable)    1  Very slight    2  Slight    3  Moderate    4 Somewhat severe    5 Severe      6    7  Very severe    8    9   Very, very severe (almost maximal)  10  Maximal    Sleep Apnea:    She presents for a sleep evaluation related to stigmata for NILS.   Patient has been  observed snoring with restless sleep, frequent awakening, tossing/turning.   Patient reports non restful' sleep.  She reports sometimes morning headache.   She reports day time napping.  Day time napping duration 15 - 20 Minutes, 3 to 4 times a day.  [place] sleepiness score was 6.  Neck circumference is 16.5 inches.  (42 cm.)  She denies recent weight gain.  Mallampati score 4  Cardiovascular risk factors: hypertension, history of prior stroke and obesity  Bed time is 1300  Wake time is 0430 - 0500  Sleep onset is within 30 Minutes.  Sleep maintenance difficulties related to early morning awakening, frequent night time awakening and non-restful sleep  Wake after sleep onset occurs four times a night.  Nocturia occurs four times a night,   Sleep aids : No  Dry mouth : Yes,   Sleep walking: No,   Sleep talking : No,   Sleep eating:No  Vivid Dreams : No,   Cataplexy : No,   Hypnogogic hallucinations:  No    STOP - BANG Questionnaire:   1. Snoring : Do you snore loudly ?     YES  2. Tired : Do you often feel tired, fatigued, or sleepy during daytime?   YES  3. Observed: Has anyone observed you stop breathing during your sleep?    NO  4. Blood pressure : Do you have or are you being treated for high blood pressure?    YES  5. BMI :BMI more than 35 kg/m2?    YES  6. Age : Age over 50 yr old?    YES  7. Neck circumference: Neck circumference greater than 40 cm?    YES  8. Gender: Gender male?   NO    SCORE: 6    High risk of NILS: Yes 5 - 8  Intermediate risk of NILS: Yes 3 - 4  Low risk of NILS: Yes 0 - 2    Previous Report Reviewed: lab reports, office notes and radiology reports     Past Medical History: The following portions of the patient's history were reviewed and updated as appropriate:   She  has a past surgical history that includes Oophorectomy;  section; Appendectomy; and [place] lymph node biopsy (Right, 3/27/2019).  Her family history includes Diabetes in her maternal grandfather, maternal  "grandmother, and mother.  She  reports that she has never smoked. She has never used smokeless tobacco. She reports that she does not drink alcohol or use drugs.  She has a current medication list which includes the following prescription(s): anastrozole, aspirin, atorvastatin, blood pressure kit med and lrg, butalbital-acetaminophen-caff -40 mg, butalbital-acetaminophen-caffeine -40 mg, cholecalciferol (vitamin d3), clonidine, diazepam, emollient combination no.63, furosemide, gabapentin, hydrochlorothiazide, losartan, morphine, ondansetron, prochlorperazine, propranolol, tramadol, albuterol, and inhalation spacing device.  She is allergic to pcn [penicillins]..    Review of Systems   Constitutional: Negative for fever, chills, weight loss, weight gain, activity change, appetite change, fatigue and night sweats.   HENT: Negative for postnasal drip, rhinorrhea, sinus pressure, voice change and congestion.    Eyes: Negative for redness and itching.   Respiratory: Positive for dyspnea on extertion (chronic). Negative for snoring, cough, sputum production, chest tightness, shortness of breath, wheezing, orthopnea, asthma nighttime symptoms, use of rescue inhaler and somnolence.    Cardiovascular: Negative.  Negative for chest pain, palpitations and leg swelling.   Genitourinary: Negative for difficulty urinating and hematuria.   Endocrine: Negative for cold intolerance and heat intolerance.    Musculoskeletal: Negative for arthralgias, gait problem, joint swelling and myalgias.   Skin: Negative.    Gastrointestinal: Negative for nausea, vomiting, abdominal pain and acid reflux.   Neurological: Negative for dizziness, weakness, light-headedness and headaches.   Hematological: Negative for adenopathy. No excessive bruising.   All other systems reviewed and are negative.       Objective:   /80   Pulse 75   Resp 18   Ht 5' 9" (1.753 m)   Wt (!) 148.4 kg (327 lb 2.6 oz)   LMP  (LMP Unknown)   SpO2 " 98%   BMI 48.31 kg/m²   Physical Exam   Constitutional: She is oriented to person, place, and time. She appears well-developed and well-nourished. She is active and cooperative.  Non-toxic appearance. She does not appear ill. No distress.   HENT:   Head: Normocephalic.   Right Ear: External ear normal.   Left Ear: External ear normal.   Nose: Nose normal.   Mouth/Throat: Oropharynx is clear and moist. No oropharyngeal exudate.   Neck circumference is 16.5 inches.  (42 cm.)  Mallampati score 4     Eyes: Conjunctivae are normal.   Neck: Normal range of motion. Neck supple.   Cardiovascular: Normal rate, regular rhythm, normal heart sounds and intact distal pulses.   Pulmonary/Chest: Effort normal and breath sounds normal. No stridor. She has no wheezes. She has no rales.   Abdominal: Soft.   Musculoskeletal: Normal range of motion.   Lymphadenopathy:     She has no cervical adenopathy.   Neurological: She is alert and oriented to person, place, and time.   Skin: Skin is warm and dry.   Psychiatric: She has a normal mood and affect. Her behavior is normal. Judgment and thought content normal.   Vitals reviewed.    Personal Diagnostic Review  X-Ray Chest AP Portable  Narrative: EXAMINATION:  XR CHEST AP PORTABLE    CLINICAL HISTORY:  weakness;    COMPARISON:  May 19th    FINDINGS:  Parts of is normal with mild tortuosity of the thoracic aorta.  Lung fields are clear.  Impression: No acute cardiopulmonary abnormality suggested.  No detrimental change.    Electronically signed by: Gregorio Ortiz  Date:    06/03/2019  Time:    13:38  CT Head Without Contrast  Narrative: EXAMINATION:  CT HEAD WITHOUT CONTRAST    CLINICAL HISTORY:  Headache, post trauma;    TECHNIQUE:  Low dose axial CT images obtained throughout the head without intravenous contrast. Sagittal and coronal reconstructions were performed.  All CT scans at this facility use dose modulation, iterative reconstruction and/or weight based dosing when appropriate  to reduce radiation dose to as low as reasonably achievable.    COMPARISON:  05/18/2019    FINDINGS:  Intracranial compartment:    The brain parenchyma demonstrates areas of decreased attenuation with mild to moderate periventricular white matter consistent with chronic microvascular ischemic changes..  No parenchymal mass, hemorrhage, edema or major vascular distribution infarct.  Vascular calcifications are noted.    Mild prominence of the sulci and ventricles are consistent with age-related involutional changes.    No extra-axial blood or fluid collections.    Skull/extracranial contents (limited evaluation): No fracture. Paranasal sinuses are clear.  Right mastoid air cells are clear.  Small left mastoid effusion.  Impression: Chronic microvascular ischemic changes.    Small left mastoid effusion.    Electronically signed by: Gregorio Banks MD  Date:    06/03/2019  Time:    12:28    Assessment:     1. Shortness of breath on exertion    2. Essential hypertension    3. Cerebrovascular accident (CVA) due to thrombosis of precerebral artery    4. Sleep-disordered breathing    5. Snoring    6. Feeling tired    7. Morbid obesity with BMI of 45.0-49.9, adult      Orders Placed This Encounter   Procedures    Polysomnogram (CPAP will be added if patient meets diagnostic criteria.)     Standing Status:   Future     Standing Expiration Date:   6/28/2020    Complete PFT with bronchodilator     Standing Status:   Future     Standing Expiration Date:   6/28/2020    Stress test, pulmonary     Standing Status:   Future     Standing Expiration Date:   6/28/2020    PULM - Arterial Blood Gases--in addition to PFT only     Standing Status:   Future     Standing Expiration Date:   6/28/2020     Plan:   Discussed diagnosis, its evaluation, treatment and usual course. All questions answered.  Problem List Items Addressed This Visit     Stroke    Overview     no residual defect         Relevant Orders    Polysomnogram (CPAP will be  added if patient meets diagnostic criteria.)    Shortness of breath on exertion - Primary    Current Assessment & Plan     Shortness of breath with exertion chronic   Proceed with complete PFT, ABG, 6 min walk distance         Relevant Medications    albuterol (VENTOLIN HFA) 90 mcg/actuation inhaler    inhalation spacing device (COMPACT SPACE CHAMBER)    Other Relevant Orders    Complete PFT with bronchodilator    Stress test, pulmonary    PULM - Arterial Blood Gases--in addition to PFT only    Morbid obesity with BMI of 45.0-49.9, adult    Current Assessment & Plan     Encouraged calorie reduction and 30 minutes of exercise daily. Discussed impact of obesity on general health.             Relevant Orders    Polysomnogram (CPAP will be added if patient meets diagnostic criteria.)    Hypertension    Relevant Orders    Polysomnogram (CPAP will be added if patient meets diagnostic criteria.)      Other Visit Diagnoses     Sleep-disordered breathing        Relevant Orders    Polysomnogram (CPAP will be added if patient meets diagnostic criteria.)    Snoring        Relevant Orders    Polysomnogram (CPAP will be added if patient meets diagnostic criteria.)    Feeling tired        Relevant Orders    Polysomnogram (CPAP will be added if patient meets diagnostic criteria.)        Plan summary  -  Patient high risk for obstructive sleep apnea proceed with in-lab sleep testing, patient states agreeable to PSG testing.  -  Chronic shortness of breath is exertion proceed with complete PFT, ABG, 6 min walk distance  -  Weight loss to BMI below 35 recommended.     Follow up for Next available sob w/review cpf/pul stress/abg.  then fu [facility] Sleep Study results review.     Thank you for the opportunity to participate in the care of this patient.

## 2019-07-01 ENCOUNTER — TELEPHONE (OUTPATIENT)
Dept: INTERNAL MEDICINE | Facility: CLINIC | Age: 69
End: 2019-07-01

## 2019-07-01 ENCOUNTER — TELEPHONE (OUTPATIENT)
Dept: PHYSICAL MEDICINE AND REHAB | Facility: CLINIC | Age: 69
End: 2019-07-01

## 2019-07-01 ENCOUNTER — PATIENT OUTREACH (OUTPATIENT)
Dept: ADMINISTRATIVE | Facility: HOSPITAL | Age: 69
End: 2019-07-01

## 2019-07-01 ENCOUNTER — OFFICE VISIT (OUTPATIENT)
Dept: ORTHOPEDICS | Facility: CLINIC | Age: 69
End: 2019-07-01
Payer: COMMERCIAL

## 2019-07-01 ENCOUNTER — OFFICE VISIT (OUTPATIENT)
Dept: INTERNAL MEDICINE | Facility: CLINIC | Age: 69
End: 2019-07-01
Payer: COMMERCIAL

## 2019-07-01 VITALS
HEIGHT: 69 IN | WEIGHT: 293 LBS | DIASTOLIC BLOOD PRESSURE: 90 MMHG | HEART RATE: 81 BPM | SYSTOLIC BLOOD PRESSURE: 135 MMHG | BODY MASS INDEX: 43.4 KG/M2

## 2019-07-01 VITALS
TEMPERATURE: 98 F | HEART RATE: 78 BPM | WEIGHT: 293 LBS | BODY MASS INDEX: 43.4 KG/M2 | HEIGHT: 69 IN | OXYGEN SATURATION: 96 % | SYSTOLIC BLOOD PRESSURE: 128 MMHG | DIASTOLIC BLOOD PRESSURE: 88 MMHG

## 2019-07-01 DIAGNOSIS — R26.9 GAIT ABNORMALITY: ICD-10-CM

## 2019-07-01 DIAGNOSIS — R45.86 MOOD SWING: ICD-10-CM

## 2019-07-01 DIAGNOSIS — R35.0 URINARY FREQUENCY: Primary | ICD-10-CM

## 2019-07-01 DIAGNOSIS — M17.0 PRIMARY OSTEOARTHRITIS OF BOTH KNEES: Primary | ICD-10-CM

## 2019-07-01 PROCEDURE — 99204 OFFICE O/P NEW MOD 45 MIN: CPT | Mod: S$GLB,,, | Performed by: PHYSICAL MEDICINE & REHABILITATION

## 2019-07-01 PROCEDURE — 3079F PR MOST RECENT DIASTOLIC BLOOD PRESSURE 80-89 MM HG: ICD-10-PCS | Mod: CPTII,S$GLB,, | Performed by: FAMILY MEDICINE

## 2019-07-01 PROCEDURE — 99999 PR PBB SHADOW E&M-EST. PATIENT-LVL III: ICD-10-PCS | Mod: PBBFAC,,, | Performed by: FAMILY MEDICINE

## 2019-07-01 PROCEDURE — 3075F PR MOST RECENT SYSTOLIC BLOOD PRESS GE 130-139MM HG: ICD-10-PCS | Mod: CPTII,S$GLB,, | Performed by: PHYSICAL MEDICINE & REHABILITATION

## 2019-07-01 PROCEDURE — 3074F PR MOST RECENT SYSTOLIC BLOOD PRESSURE < 130 MM HG: ICD-10-PCS | Mod: CPTII,S$GLB,, | Performed by: FAMILY MEDICINE

## 2019-07-01 PROCEDURE — 3075F SYST BP GE 130 - 139MM HG: CPT | Mod: CPTII,S$GLB,, | Performed by: PHYSICAL MEDICINE & REHABILITATION

## 2019-07-01 PROCEDURE — 3074F SYST BP LT 130 MM HG: CPT | Mod: CPTII,S$GLB,, | Performed by: FAMILY MEDICINE

## 2019-07-01 PROCEDURE — 99214 OFFICE O/P EST MOD 30 MIN: CPT | Mod: S$GLB,,, | Performed by: FAMILY MEDICINE

## 2019-07-01 PROCEDURE — 99204 PR OFFICE/OUTPT VISIT, NEW, LEVL IV, 45-59 MIN: ICD-10-PCS | Mod: S$GLB,,, | Performed by: PHYSICAL MEDICINE & REHABILITATION

## 2019-07-01 PROCEDURE — 99214 PR OFFICE/OUTPT VISIT, EST, LEVL IV, 30-39 MIN: ICD-10-PCS | Mod: S$GLB,,, | Performed by: FAMILY MEDICINE

## 2019-07-01 PROCEDURE — 1101F PT FALLS ASSESS-DOCD LE1/YR: CPT | Mod: CPTII,S$GLB,, | Performed by: PHYSICAL MEDICINE & REHABILITATION

## 2019-07-01 PROCEDURE — 1101F PR PT FALLS ASSESS DOC 0-1 FALLS W/OUT INJ PAST YR: ICD-10-PCS | Mod: CPTII,S$GLB,, | Performed by: PHYSICAL MEDICINE & REHABILITATION

## 2019-07-01 PROCEDURE — 3079F DIAST BP 80-89 MM HG: CPT | Mod: CPTII,S$GLB,, | Performed by: FAMILY MEDICINE

## 2019-07-01 PROCEDURE — 1101F PR PT FALLS ASSESS DOC 0-1 FALLS W/OUT INJ PAST YR: ICD-10-PCS | Mod: CPTII,S$GLB,, | Performed by: FAMILY MEDICINE

## 2019-07-01 PROCEDURE — 99999 PR PBB SHADOW E&M-EST. PATIENT-LVL III: ICD-10-PCS | Mod: PBBFAC,,, | Performed by: PHYSICAL MEDICINE & REHABILITATION

## 2019-07-01 PROCEDURE — 3078F DIAST BP <80 MM HG: CPT | Mod: CPTII,S$GLB,, | Performed by: PHYSICAL MEDICINE & REHABILITATION

## 2019-07-01 PROCEDURE — 3078F PR MOST RECENT DIASTOLIC BLOOD PRESSURE < 80 MM HG: ICD-10-PCS | Mod: CPTII,S$GLB,, | Performed by: PHYSICAL MEDICINE & REHABILITATION

## 2019-07-01 PROCEDURE — 1101F PT FALLS ASSESS-DOCD LE1/YR: CPT | Mod: CPTII,S$GLB,, | Performed by: FAMILY MEDICINE

## 2019-07-01 PROCEDURE — 99999 PR PBB SHADOW E&M-EST. PATIENT-LVL III: CPT | Mod: PBBFAC,,, | Performed by: PHYSICAL MEDICINE & REHABILITATION

## 2019-07-01 PROCEDURE — 99999 PR PBB SHADOW E&M-EST. PATIENT-LVL III: CPT | Mod: PBBFAC,,, | Performed by: FAMILY MEDICINE

## 2019-07-01 RX ORDER — NAPROXEN 500 MG/1
500 TABLET ORAL 2 TIMES DAILY WITH MEALS
Qty: 60 TABLET | Refills: 0 | Status: SHIPPED | OUTPATIENT
Start: 2019-07-01 | End: 2020-02-20 | Stop reason: SDUPTHER

## 2019-07-01 RX ORDER — QUETIAPINE FUMARATE 50 MG/1
50 TABLET, FILM COATED ORAL NIGHTLY
Qty: 30 TABLET | Refills: 1 | Status: SHIPPED | OUTPATIENT
Start: 2019-07-01 | End: 2019-07-09

## 2019-07-01 NOTE — PROGRESS NOTES
PM&R NEW PATIENT HISTORY & PHYSICAL:    Referring Physician: No ref. provider found    Chief Complaint   Patient presents with    Knee Pain     Bilateral knee pain    Leg Pain     Bilateral leg pain       HPI: This is a 69 y.o.  female being seen in clinic today for evaluation of Knee Pain (Bilateral knee pain) and Leg Pain (Bilateral leg pain)   The problem first began about 6 months ago, possibly secondary to her gaining weight. She was seeing Dr. Urbina at [facility] and was supposed to have knee injections but before that could be done she was diagnosed with breast cancer and has been going through treatment for that. She feels sharp pain in her bilateral knees. The symptoms are worsening. She's had some balance problems as well and occasional falls. She has tried biofreeze and pain cream without improvement. She has not tried physical therapy. She is tearful and notes she is going through a lot with radiation treatments and blood pressure issues.     History obtained from patient.    Past family, medical, social, surgical history, and vital signs reviewed in chart.    Review of Systems   Constitutional: Negative for chills, fever and weight loss.   HENT: Negative for hearing loss and sore throat.    Eyes: Negative for blurred vision, photophobia and pain.   Respiratory: Negative for shortness of breath.    Cardiovascular: Negative for chest pain.   Gastrointestinal: Negative for abdominal pain.   Genitourinary: Negative for dysuria.   Skin: Negative for rash.   Neurological: Negative for tingling and headaches.   Endo/Heme/Allergies: Does not bruise/bleed easily.   Psychiatric/Behavioral: Negative for depression.       Physical Exam   Constitutional: She is oriented to person, place, and time. She appears well-developed and well-nourished.   Morbidly obese   HENT:   Head: Normocephalic and atraumatic.   Eyes: Pupils are equal, round, and reactive to light. EOM are normal.   Neck: Normal range of motion.  Neck supple.   Cardiovascular: Intact distal pulses.   Pulmonary/Chest: Effort normal.   Abdominal: She exhibits no distension.   Musculoskeletal:        Right knee: She exhibits decreased range of motion (missing 5 degrees terminal extension), swelling (possible), LCL laxity, bony tenderness and MCL laxity. She exhibits normal patellar mobility. Tenderness found. Medial joint line, lateral joint line, MCL, LCL and patellar tendon tenderness noted.        Left knee: She exhibits decreased range of motion (minus 10 degree full active extension, better passively), swelling (possible, hard to discern given body habitus) and bony tenderness. She exhibits no LCL laxity, normal patellar mobility and no MCL laxity. Tenderness found. Medial joint line, lateral joint line, MCL, LCL and patellar tendon tenderness noted.   Antalgic gait using quad cane in right hand   Neurological: She is alert and oriented to person, place, and time. She has normal strength and normal reflexes. No sensory deficit.   Skin: Skin is warm and dry.   Psychiatric: She has a normal mood and affect.   Vitals reviewed.      IMPRESSION/PLAN: Susu is a 69 y.o.  female with:    1. Primary osteoarthritis of both knees  - Ambulatory Referral to Orthopedics    2. Gait abnormality  - Ambulatory Referral to Orthopedics     The findings were discussed with Susu in detail. She is currently to tender and limited to be able to tolerate PT. I'll check with her PCP to see if she is safe for NSAIDS given multiple comorbidities with HTN, h/o CVA, and breat cancer. I strongly encouraged her to follow-up with Dr. Urbina at [facility] who was following her until recently. I would not feel comfortable injection her knees without ultrasound guidance, and our surgeons won't evaluate her given her BMI. All of her questions were answered. She was provided this plan in writing.      Juliann Guerin M.D.  Physical Medicine and Rehab

## 2019-07-01 NOTE — PROGRESS NOTES
Subjective:       Patient ID: Susu Luther is a 69 y.o. female.    Chief Complaint: Urinary Incontinence; Knee Pain; and Headache (sharp pain )    Pt is a 69 year old her for acute urinary frequency. She denied painful urination or urgency. Pt reportedly was just treated for UTI 3 weeks ago. No fever and denied any blood in her urine. Pt has also been recently diagnosed with breast cancer. She has had emotional swings as well as insomnia. No SI/HI    Review of Systems   Constitutional: Negative.    Respiratory: Negative.    Cardiovascular: Negative.    Genitourinary: Positive for frequency. Negative for urgency.   Musculoskeletal: Negative.    Neurological: Negative.    Hematological: Negative.        Objective:      Physical Exam   Constitutional: She is oriented to person, place, and time. She appears well-developed and well-nourished.   Cardiovascular: Normal rate and regular rhythm. Exam reveals no friction rub.   Pulmonary/Chest: Effort normal and breath sounds normal. No stridor. She has no wheezes.   Abdominal: Soft. Bowel sounds are normal.   Neurological: She is alert and oriented to person, place, and time.   Psychiatric: Judgment normal. Her mood appears anxious. Cognition and memory are normal. She expresses no homicidal and no suicidal ideation. She expresses no suicidal plans and no homicidal plans.       Assessment:       1. Urinary frequency    2. Mood swing        Plan:       Urinary frequency  Comments:  Will get urine and wait for cultures  Orders:  -     Urinalysis; Future; Expected date: 07/01/2019  -     Urine culture; Future; Expected date: 07/01/2019    Mood swing  Comments:  Seroquel 50 mg with either 1/2 or 1 tab for mood stability and sleep    Other orders  -     QUEtiapine (SEROQUEL) 50 MG tablet; Take 1 tablet (50 mg total) by mouth every evening.  Dispense: 30 tablet; Refill: 1

## 2019-07-03 ENCOUNTER — TELEPHONE (OUTPATIENT)
Dept: PULMONOLOGY | Facility: CLINIC | Age: 69
End: 2019-07-03

## 2019-07-03 DIAGNOSIS — I63.00 CEREBROVASCULAR ACCIDENT (CVA) DUE TO THROMBOSIS OF PRECEREBRAL ARTERY: ICD-10-CM

## 2019-07-03 NOTE — TELEPHONE ENCOUNTER
Telephone patient to determine the year that she had her CVA as the referral centers questioning date of stroke.  Patient advises CVA 2009.

## 2019-07-08 DIAGNOSIS — R35.0 URINARY FREQUENCY: Primary | ICD-10-CM

## 2019-07-09 ENCOUNTER — OFFICE VISIT (OUTPATIENT)
Dept: HEMATOLOGY/ONCOLOGY | Facility: CLINIC | Age: 69
End: 2019-07-09
Payer: COMMERCIAL

## 2019-07-09 VITALS
DIASTOLIC BLOOD PRESSURE: 87 MMHG | TEMPERATURE: 98 F | BODY MASS INDEX: 43.4 KG/M2 | OXYGEN SATURATION: 6 % | HEIGHT: 69 IN | HEART RATE: 63 BPM | WEIGHT: 293 LBS | SYSTOLIC BLOOD PRESSURE: 136 MMHG

## 2019-07-09 DIAGNOSIS — C50.411 MALIGNANT NEOPLASM OF UPPER-OUTER QUADRANT OF RIGHT BREAST IN FEMALE, ESTROGEN RECEPTOR POSITIVE: Primary | Chronic | ICD-10-CM

## 2019-07-09 DIAGNOSIS — Z17.0 MALIGNANT NEOPLASM OF UPPER-OUTER QUADRANT OF RIGHT BREAST IN FEMALE, ESTROGEN RECEPTOR POSITIVE: Primary | Chronic | ICD-10-CM

## 2019-07-09 PROCEDURE — 99214 PR OFFICE/OUTPT VISIT, EST, LEVL IV, 30-39 MIN: ICD-10-PCS | Mod: S$GLB,,, | Performed by: INTERNAL MEDICINE

## 2019-07-09 PROCEDURE — 1101F PR PT FALLS ASSESS DOC 0-1 FALLS W/OUT INJ PAST YR: ICD-10-PCS | Mod: CPTII,S$GLB,, | Performed by: INTERNAL MEDICINE

## 2019-07-09 PROCEDURE — 3075F SYST BP GE 130 - 139MM HG: CPT | Mod: CPTII,S$GLB,, | Performed by: INTERNAL MEDICINE

## 2019-07-09 PROCEDURE — 3079F PR MOST RECENT DIASTOLIC BLOOD PRESSURE 80-89 MM HG: ICD-10-PCS | Mod: CPTII,S$GLB,, | Performed by: INTERNAL MEDICINE

## 2019-07-09 PROCEDURE — 3075F PR MOST RECENT SYSTOLIC BLOOD PRESS GE 130-139MM HG: ICD-10-PCS | Mod: CPTII,S$GLB,, | Performed by: INTERNAL MEDICINE

## 2019-07-09 PROCEDURE — 99214 OFFICE O/P EST MOD 30 MIN: CPT | Mod: S$GLB,,, | Performed by: INTERNAL MEDICINE

## 2019-07-09 PROCEDURE — 99999 PR PBB SHADOW E&M-EST. PATIENT-LVL III: ICD-10-PCS | Mod: PBBFAC,,, | Performed by: INTERNAL MEDICINE

## 2019-07-09 PROCEDURE — 1101F PT FALLS ASSESS-DOCD LE1/YR: CPT | Mod: CPTII,S$GLB,, | Performed by: INTERNAL MEDICINE

## 2019-07-09 PROCEDURE — 3079F DIAST BP 80-89 MM HG: CPT | Mod: CPTII,S$GLB,, | Performed by: INTERNAL MEDICINE

## 2019-07-09 PROCEDURE — 99999 PR PBB SHADOW E&M-EST. PATIENT-LVL III: CPT | Mod: PBBFAC,,, | Performed by: INTERNAL MEDICINE

## 2019-07-09 RX ORDER — MORPHINE SULFATE 15 MG/1
15 TABLET ORAL EVERY 6 HOURS PRN
Qty: 20 TABLET | Refills: 0 | Status: SHIPPED | OUTPATIENT
Start: 2019-07-09 | End: 2019-07-09 | Stop reason: SDUPTHER

## 2019-07-09 RX ORDER — OLANZAPINE 10 MG/1
10 TABLET ORAL NIGHTLY
COMMUNITY
Start: 2019-07-07 | End: 2020-11-02

## 2019-07-09 RX ORDER — TRAMADOL HYDROCHLORIDE 50 MG/1
50 TABLET ORAL EVERY 6 HOURS PRN
Qty: 50 TABLET | Refills: 0 | Status: SHIPPED | OUTPATIENT
Start: 2019-07-09 | End: 2019-07-09 | Stop reason: SDUPTHER

## 2019-07-09 RX ORDER — MORPHINE SULFATE 15 MG/1
15 TABLET ORAL EVERY 6 HOURS PRN
Qty: 20 TABLET | Refills: 0 | Status: SHIPPED | OUTPATIENT
Start: 2019-07-09 | End: 2019-08-08 | Stop reason: SDUPTHER

## 2019-07-09 RX ORDER — TRAMADOL HYDROCHLORIDE 50 MG/1
50 TABLET ORAL EVERY 6 HOURS PRN
Qty: 50 TABLET | Refills: 0 | Status: SHIPPED | OUTPATIENT
Start: 2019-07-09 | End: 2019-08-08 | Stop reason: SDUPTHER

## 2019-07-09 NOTE — PROGRESS NOTES
Subjective:       Patient ID: Susu Luther is a 69 y.o. female.    Chief Complaint: Malignant neoplasm of upper-outer quadrant of right breast in female, estrogen receptor positive [C50.411, Z17.0]  HPI: We have an opportunity to see Ms. Susu Luther in Hematology Oncology clinic at [facility] on 07/09/2019.  Ms. Susu Luther is a 69 y.o. woman with pT2N0 invasive lobular breast cancer ER positive [place] negative Her2 negative s/p lumpectomy and sentinel node biopsy.       Was found right breast mass on self breast exam.  Screening mammogram on 2/26/2019 showed Impression:  Right  Mass: Right breast mass at the lower outer middle position. Assessment: 0 - Incomplete. Special Views: Spot Compression View and Ultrasound are recommended.      Left  There is no mammographic evidence of malignancy.     On March 4, 2019, underwent US guided biopsy showed FINAL PATHOLOGIC DIAGNOSIS  1. BREAST, RIGHT, LOWER OUTER 08:00, ULTRASOUND-GUIDED BIOPSY:  Invasive lobular carcinoma of breast.  Size of invasive carcinoma: 19 mm in greatest linear dimension within a single core biopsy fragment.  [place] Histologic Score: Grade 2 of 3.  Tubule formation: 3  Nuclear pleomorphism: 2  Mitoses: 1  Associated lobular carcinoma in situ (LCIS) is present.  No lymphovascular or perineural invasion.  Breast biomarkers are pending and will be included in the supplemental report.  2. AXILLA, RIGHT LYMPH NODES, ULTRASOUND-GUIDED BIOPSY:  Benign lymph node without evidence of metastatic carcinoma.  Immunohistochemical stains (Cam 5.2 and CK7) are confirmatory.     On March 27, 2019 underwent lumpectomy with sentinel node.  Pathology showed   PROCEDURE: Excision/lumpectomy  SPECIMEN LATERALITY: Right breast  TUMOR SITE: 8 o'clock  TUMOR SIZE: Approximately 4.0 x 3.0 x 3.0 cm  HISTOLOGIC TYPE: Invasive lobular carcinoma  HISTOLOGIC GRADE: Grade 2 of 3  Tubular differentiation: 3  Nuclear pleomorphism: 2  Mitotic rate:  1  TUMOR FOCALITY: Single focus of invasive carcinoma  DUCTAL CARCINOMA IN SITU: Not identified  LOBULAR CARCINOMA IN SITU: Present  MARGINS:  Main specimen (#6) : Carcinoma present at superior, deep and medial margins  Distance from inferior margin: 5 mm  Distance from lateral margin: greater than 20 mm  Distance of anterior margin: 15 mm  Additionally submitted margins (specimen #7): Distance from newly designated margin: 4 mm  REGIONAL LYMPH NODES: Uninvolved tumor cells  Number of total lymph nodes examined: 8  Number of sentinel nodes examined: 4  TREATMENT EFFECT: No known presurgical therapy  LYMPH-VASCULAR INVASION: Not identified  ANCILLARY STUDIES (performed on patient's previous biopsy MS ):  ER: Positive (95% of tumor cells)  AK: Negative (less than 1 % of tumor cells)  Her2 by FISH: Negative (not amplified)  Ki-67%: Positive (70 % of tumor cells)  ADDITIONAL FINDINGS: Apocrine metaplasia, usual ductal hyperplasia, fibroadenomatoid change  PATHOLOGIC STAGE CLASSIFICATION: pT2 pN0     Oncotype type DX recurrence score 22, with distant recurrence risk 8%, absolute chemotherapy benefit <1%.     Completed adjuvant radiation.  Has right breast tenderness.  Started on anastrozole.  Has arthralgias.               Malignant neoplasm of upper-outer quadrant of right breast in female, estrogen receptor positive    3/14/2019 Initial Diagnosis     Malignant neoplasm of upper-outer quadrant of right breast in female, estrogen receptor positive         3/27/2019 Cancer Staged     Staging form: Breast, AJCC 8th Edition  - Pathologic stage from 3/27/2019: Stage IIA (pT2, pN0(sn), cM0, G2, ER+, AK-, HER2-) - Signed by Guido Huynh MD on 4/4/2019 5/20/2019 - 6/18/2019 Radiation Therapy     Treatment Site Ref. ID Energy Dose/Fx (Gy) #Fx Dose Correction (Gy) Total Dose (Gy) Start Date End Date Elapsed Days   Boost Boost 18X 2.5 4 / 4 0 10 6/13/2019 6/18/2019 5   RtBreastAddMV Rt Breast 18X/6X 2.66 16 / 16 0  42.56 2019 23               Past Medical History:   Diagnosis Date    Encounter for blood transfusion     Hypertension     Malignant neoplasm of upper-outer quadrant of right breast in female, estrogen receptor positive 3/14/2019    Stroke 2009    no residual defect     Family History   Problem Relation Age of Onset    Diabetes Maternal Grandmother     Diabetes Maternal Grandfather     Diabetes Mother     Breast cancer Neg Hx     Colon cancer Neg Hx     Ovarian cancer Neg Hx      Social History     Socioeconomic History    Marital status:      Spouse name: Not on file    Number of children: Not on file    Years of education: Not on file    Highest education level: Not on file   Occupational History    Not on file   Social Needs    Financial resource strain: Not on file    Food insecurity:     Worry: Not on file     Inability: Not on file    Transportation needs:     Medical: Not on file     Non-medical: Not on file   Tobacco Use    Smoking status: Never Smoker    Smokeless tobacco: Never Used   Substance and Sexual Activity    Alcohol use: No    Drug use: No    Sexual activity: Yes     Partners: Male     Birth control/protection: Post-menopausal   Lifestyle    Physical activity:     Days per week: Not on file     Minutes per session: Not on file    Stress: Not on file   Relationships    Social connections:     Talks on phone: Not on file     Gets together: Not on file     Attends [employer] service: Not on file     Active member of club or organization: Not on file     Attends meetings of clubs or organizations: Not on file     Relationship status: Not on file   Other Topics Concern    Not on file   Social History Narrative    Not on file     Past Surgical History:   Procedure Laterality Date    APPENDECTOMY      BIOPSY, LYMPH NODE, SENTINEL Right 3/27/2019    [facility]     SECTION      x 1    LUMPECTOMY, BREAST Right  3/27/2019    [facility]    OOPHORECTOMY      right      Current Outpatient Medications   Medication Sig Dispense Refill    albuterol (VENTOLIN HFA) 90 mcg/actuation inhaler Inhale 2 puffs into the lungs every 4 (four) hours as needed for Shortness of Breath. 18 g 11    anastrozole (ARIMIDEX) 1 mg Tab Take 1 tablet (1 mg total) by mouth once daily. 30 tablet 11    aspirin (ECOTRIN) 81 MG EC tablet Take 1 tablet (81 mg total) by mouth once daily.  0    atorvastatin (LIPITOR) 40 MG tablet Take 1 tablet (40 mg total) by mouth once daily. 90 tablet 3    blood pressure kit med and lrg Kit Take blood pressure first thing in the morning. 1 each 0    butalbital-acetaminophen-caff -40 mg -40 mg Cap Take 1 tablet by mouth 3 (three) times daily. 30 capsule 0    cholecalciferol, vitamin D3, 50,000 unit capsule Take 50,000 Units by mouth once a week.   0    cloNIDine (CATAPRES) 0.1 MG tablet Take 1 tablet (0.1 mg total) by mouth 3 (three) times daily. 90 tablet 1    diazePAM (VALIUM) 10 MG Tab Take 1 tablet (10 mg total) by mouth 2 (two) times daily. 30 tablet 1    emollient combination no.63 (MIADERM) Lotn Apply 1 application topically 3 (three) times daily.      furosemide (LASIX) 20 MG tablet take 1/2 tablet by mouth a day. 30 tablet 0    gabapentin (NEURONTIN) 300 MG capsule Take 1 capsule (300 mg total) by mouth 3 (three) times daily. One capsule on the 1st day, 2 capsules on the 2nd day and then t.i.d. 90 capsule 6    hydroCHLOROthiazide (MICROZIDE) 12.5 mg capsule Take 1 capsule (12.5 mg total) by mouth once daily. 30 capsule 8    inhalation spacing device (COMPACT SPACE CHAMBER) Use as directed for inhalation. 1 Device 0    losartan (COZAAR) 50 MG tablet Take 1 tablet (50 mg total) by mouth once daily. 90 tablet 3    morphine (MSIR) 15 MG tablet Take 1 tablet (15 mg total) by mouth every 6 (six) hours as needed for Pain. 20 tablet 0    naproxen (NAPROSYN) 500  MG tablet Take 1 tablet (500 mg total) by mouth 2 (two) times daily with meals. 60 tablet 0    OLANZapine (ZYPREXA) 10 MG tablet Take 10 mg by mouth nightly.      ondansetron (ZOFRAN-ODT) 8 MG TbDL Take 1 tablet (8 mg total) by mouth every 8 (eight) hours as needed (nausea). 60 tablet 1    prochlorperazine (COMPAZINE) 10 MG tablet Take 1 tablet (10 mg total) by mouth 4 (four) times daily as needed (nausea). 60 tablet 1    propranolol (INDERAL) 40 MG tablet Take 1 tablet (40 mg total) by mouth 2 (two) times daily. 60 tablet 11    traMADol (ULTRAM) 50 mg tablet Take 1 tablet (50 mg total) by mouth every 6 (six) hours as needed for Pain. 50 tablet 0     No current facility-administered medications for this visit.        Labs:  Lab Results   Component Value Date    WBC 8.80 06/03/2019    HGB 10.5 (L) 06/03/2019    HCT 33.9 (L) 06/03/2019    MCV 79 (L) 06/03/2019     06/03/2019     BMP  Lab Results   Component Value Date     06/03/2019    K 4.4 06/03/2019     06/03/2019    CO2 25 06/03/2019    BUN 13 06/03/2019    CREATININE 1.2 06/03/2019    CALCIUM 9.3 06/03/2019    ANIONGAP 11 06/03/2019    ESTGFRAFRICA 54 (A) 06/03/2019    EGFRNONAA 47 (A) 06/03/2019     Lab Results   Component Value Date    ALT 13 06/03/2019    AST 17 06/03/2019    ALKPHOS 127 06/03/2019    BILITOT 0.2 06/03/2019       No results found for: IRON, TIBC, FERRITIN, SATURATEDIRO  No results found for: [id number]  No results found for: FOLATE  Lab Results   Component Value Date    TSH 0.799 05/18/2019       I have reviewed the radiology reports and examined the scan/xray images.    Review of Systems   Constitutional: Negative.    HENT: Negative.    Eyes: Negative.    Respiratory: Negative.    Cardiovascular: Negative.    Gastrointestinal: Negative.    Endocrine: Negative.    Genitourinary: Negative.    Musculoskeletal: Negative.    Skin: Negative.    Allergic/Immunologic: Negative.    Neurological: Negative.    Hematological:  Negative.    Psychiatric/Behavioral: Negative.      ECOG SCORE              Objective:     Vitals:    07/09/19 1432   BP: 136/87   Pulse: 63   Temp: 97.7 °F (36.5 °C)   Body mass index is 48.12 kg/m².  Physical Exam   Constitutional: She is oriented to person, place, and time. She appears well-developed and well-nourished.   HENT:   Head: Normocephalic and atraumatic.   Eyes: Conjunctivae and EOM are normal.   Neck: Normal range of motion. Neck supple.   Cardiovascular: Normal rate and regular rhythm.   Pulmonary/Chest: Effort normal and breath sounds normal.   Abdominal: Soft. Bowel sounds are normal.   Musculoskeletal: Normal range of motion.   Neurological: She is alert and oriented to person, place, and time.   Skin: Skin is warm and dry.   Psychiatric: She has a normal mood and affect. Her behavior is normal. Judgment and thought content normal.   Nursing note and vitals reviewed.        Assessment:      1. Malignant neoplasm of upper-outer quadrant of right breast in female, estrogen receptor positive           Plan:     Malignant neoplasm of upper-outer quadrant of right breast in female, estrogen receptor positive      Continue on anastrozole for 5 years.  Will get DEXA scan in 1 year.

## 2019-07-10 ENCOUNTER — TELEPHONE (OUTPATIENT)
Dept: PHARMACY | Facility: CLINIC | Age: 69
End: 2019-07-10

## 2019-07-10 NOTE — TELEPHONE ENCOUNTER
The patient contacted [facility] in regards to anastrozole refill. She confirmed she has 8 tablets remaining. Will ship refill on 7/15 for the patient to receive 7/16. $0 copay, address confirmed. No changes in medications, allergies, health conditions or missed doses.    Ms. Ovalle confirms previous ADRs of N/V and diarrhea have completely resolved. She has occasional nausea - has antiemetics on hand. She continues to have dizziness but stated it was present at baseline and is unchanged since starting anastrozole. We discussed that there is a very rare reported incidence of dizziness with anastrozole (5-8%) but since she had it prior to starting, unsure if it is related to anastrozole. She denied any episodes of falling. Provided precautions to prevent falls such as standing up slowly, having steady support to hold on to if she feels like falling, etc. She is aware to reach out to provider if dizziness worsens and/or results in falling. She has also had some leg pain which provider attributed to the medication. She had no further questions/concerns today. A pharmacist will f/u in two cycles or as clinically appropriate.

## 2019-07-19 NOTE — TELEPHONE ENCOUNTER
Patient contacted [facility] very tearful and stated she was extremely dizzy and agitated. Her BP the last time she checked it was 158/51 mmHg. She stated she has been staying hydrated. She reported having a difficult time standing/walking and walked into the door earlier today when she attempted to go to the bathroom. She reported diplopia and agitation. Urged her to call Dr. Huynh's office ASAP for recommendations - send staff message as well.

## 2019-07-23 ENCOUNTER — OFFICE VISIT (OUTPATIENT)
Dept: RADIATION ONCOLOGY | Facility: CLINIC | Age: 69
End: 2019-07-23
Payer: COMMERCIAL

## 2019-07-23 VITALS
OXYGEN SATURATION: 97 % | WEIGHT: 293 LBS | RESPIRATION RATE: 20 BRPM | TEMPERATURE: 98 F | SYSTOLIC BLOOD PRESSURE: 154 MMHG | BODY MASS INDEX: 43.4 KG/M2 | HEART RATE: 80 BPM | HEIGHT: 69 IN | DIASTOLIC BLOOD PRESSURE: 94 MMHG

## 2019-07-23 DIAGNOSIS — C50.411 MALIGNANT NEOPLASM OF UPPER-OUTER QUADRANT OF RIGHT BREAST IN FEMALE, ESTROGEN RECEPTOR POSITIVE: Primary | Chronic | ICD-10-CM

## 2019-07-23 DIAGNOSIS — Z17.0 MALIGNANT NEOPLASM OF UPPER-OUTER QUADRANT OF RIGHT BREAST IN FEMALE, ESTROGEN RECEPTOR POSITIVE: Primary | Chronic | ICD-10-CM

## 2019-07-23 PROCEDURE — 99214 PR OFFICE/OUTPT VISIT, EST, LEVL IV, 30-39 MIN: ICD-10-PCS | Mod: S$GLB,,, | Performed by: RADIOLOGY

## 2019-07-23 PROCEDURE — 3080F PR MOST RECENT DIASTOLIC BLOOD PRESSURE >= 90 MM HG: ICD-10-PCS | Mod: CPTII,S$GLB,, | Performed by: RADIOLOGY

## 2019-07-23 PROCEDURE — 99999 PR PBB SHADOW E&M-EST. PATIENT-LVL IV: ICD-10-PCS | Mod: PBBFAC,,, | Performed by: RADIOLOGY

## 2019-07-23 PROCEDURE — 1101F PR PT FALLS ASSESS DOC 0-1 FALLS W/OUT INJ PAST YR: ICD-10-PCS | Mod: CPTII,S$GLB,, | Performed by: RADIOLOGY

## 2019-07-23 PROCEDURE — 3080F DIAST BP >= 90 MM HG: CPT | Mod: CPTII,S$GLB,, | Performed by: RADIOLOGY

## 2019-07-23 PROCEDURE — 99214 OFFICE O/P EST MOD 30 MIN: CPT | Mod: S$GLB,,, | Performed by: RADIOLOGY

## 2019-07-23 PROCEDURE — 1101F PT FALLS ASSESS-DOCD LE1/YR: CPT | Mod: CPTII,S$GLB,, | Performed by: RADIOLOGY

## 2019-07-23 PROCEDURE — 3077F SYST BP >= 140 MM HG: CPT | Mod: CPTII,S$GLB,, | Performed by: RADIOLOGY

## 2019-07-23 PROCEDURE — 99999 PR PBB SHADOW E&M-EST. PATIENT-LVL IV: CPT | Mod: PBBFAC,,, | Performed by: RADIOLOGY

## 2019-07-23 PROCEDURE — 3077F PR MOST RECENT SYSTOLIC BLOOD PRESSURE >= 140 MM HG: ICD-10-PCS | Mod: CPTII,S$GLB,, | Performed by: RADIOLOGY

## 2019-07-23 NOTE — PROGRESS NOTES
[facility]  RADIATION ONCOLOGY FOLLOW UP    Name: Susu Luther : 1950     DIAGNOSIS: Right breast lower outer quadrant invasive lobular carcinoma pIIA: pT2 pN0(sn) cM0, ER positive, UT negative, her 2 Steff negative   1. 19 had a bilateral screening mammogram showing an irregular spiculated mass lower outer right breast middle depth.   2. 19 diagnostic mammogram confirmed the mass. Ultrasound showed 2 enlarged right axillary lymph nodes and a lower outer quadrant right breast mass measuring 3.8 cm maximum dimension at 8:00 position.   3. 3/4/19 right breast biopsy and clip and right lymph node biopsy and clip were performed. The axillary clip was not visualized on post biopsy imaging due to possible posterior depth of the node. The breast clip was in the expected position. The right breast returned invasive lobular carcinoma, grade 2 with no lymphovascular or perineural invasion, ER positive 95%, UT negative, her 2 2+, FISH non-amplified. The lymph nodes were benign.   4. 3/27/19 patient had a lumpectomy and sentinel lymph node biopsy. Gross analysis of the lumpectomy was concerning for positive margins of the medial, superior and inferior aspect therefore additional margins were taken. The final deep margin was the pectoralis fascia. The lumpectomy cavity was marked with 6 Ligaclips. Pathology contained 4 benign sentinel lymph nodes and 4 benign non sentinel lymph nodes. The lumpectomy contained invasive lobular carcinoma measuring 4 cm maximum dimension, grade 2. The main lumpectomy had positive margins superior, deep and medial and negative margins inferior, lateral and anterior. The additionally submitted margins had carcinoma 4 mm from the new margin. Final stage pT2 pN0(sn) cM0.   5. Oncotype DX 22.   6. 19 completed right breast 52.56 Gy radiation     CURRENT STATUS: Susu Luther is a pleasant 69 y.o. female who presents today for follow-up.  She is  "being worked up by pulmonology in the interval.  She is also following her primary physician Dr. Rojas, and her blood pressure has been under much better control.  She has avoided the ER in the recent weeks.  She was started on anastrozole, initially had some diarrhea and nausea.  She has some knee pain since starting anastrozole.  The skin of her breast did peel but she feels like it is healing well.  She is using Eucerin cream recommended by Dr. Huynh.  She continues to have some tenderness of the areola.    PHYSICAL EXAM:   Constitutional: well appearing, no acute distress, ECOG 2  Vitals: BP (!) 154/94   Pulse 80   Temp 97.9 °F (36.6 °C)   Resp 20   Ht 5' 9" (1.753 m)   Wt (!) 149.5 kg (329 lb 9.6 oz)   LMP  (LMP Unknown)   SpO2 97%   BMI 48.67 kg/m²   Lymphatic: no right axillary adenopathy  Breast:  Right breast hyperpigmentation alternating with hypopigmentation in areas of new skin.  Mild tenderness of the areola with resolved desquamation of the areola.  No suspicious masses, retractions or skin changes.  Right lower outer quadrant lumpectomy with fibrosis.  Laboratory & X-Ray Findings: Per above.      ASSESSMENT:  Recovering well from acute radiation toxicity    PLAN:  We discussed anticipated resolution of her radiation toxicity.  I will give her some Miaderm to use twice a day.  She can stop the Eucerin.  Tenderness of the areola showed improved but could take several months.  She may also continue to have hyperpigmentation/hypopigmentation of the breast from months or even years.  We discussed surveillance and she will be due for mammogram in December and I will defer to Dr. Huynh.  She is on endocrine therapy and is compliant.  I encouraged her to discuss the knee pain with Dr. Huynh as it may be related to Anastrozole.  I will see her as needed.    JACQUE Arreguin M.D.  Radiation Oncology  [facility] [address] [place] [address]  Ph: " [phone]  [email or web address]

## 2019-08-05 ENCOUNTER — HOSPITAL ENCOUNTER (OUTPATIENT)
Dept: SLEEP MEDICINE | Facility: HOSPITAL | Age: 69
Discharge: HOME | End: 2019-08-05
Attending: INTERNAL MEDICINE
Payer: COMMERCIAL

## 2019-08-05 DIAGNOSIS — G47.20 CIRCADIAN RHYTHM SLEEP DISORDER: ICD-10-CM

## 2019-08-05 DIAGNOSIS — R06.83 SNORING: ICD-10-CM

## 2019-08-05 DIAGNOSIS — G47.34 NOCTURNAL HYPOXEMIA: ICD-10-CM

## 2019-08-05 DIAGNOSIS — R53.83 FEELING TIRED: ICD-10-CM

## 2019-08-05 DIAGNOSIS — I10 ESSENTIAL HYPERTENSION: ICD-10-CM

## 2019-08-05 DIAGNOSIS — E66.01 MORBID OBESITY WITH BMI OF 45.0-49.9, ADULT: ICD-10-CM

## 2019-08-05 DIAGNOSIS — G47.30 SLEEP-DISORDERED BREATHING: ICD-10-CM

## 2019-08-05 DIAGNOSIS — G47.33 OSA (OBSTRUCTIVE SLEEP APNEA): Chronic | ICD-10-CM

## 2019-08-05 DIAGNOSIS — I63.00 CEREBROVASCULAR ACCIDENT (CVA) DUE TO THROMBOSIS OF PRECEREBRAL ARTERY: ICD-10-CM

## 2019-08-05 PROCEDURE — 95810 POLYSOM 6/> YRS 4/> PARAM: CPT

## 2019-08-05 PROCEDURE — 95810 POLYSOM 6/> YRS 4/> PARAM: CPT | Mod: 26,,, | Performed by: INTERNAL MEDICINE

## 2019-08-05 PROCEDURE — 95810 PR POLYSOMNOGRAPHY, 4 OR MORE: ICD-10-PCS | Mod: 26,,, | Performed by: INTERNAL MEDICINE

## 2019-08-05 NOTE — TELEPHONE ENCOUNTER
I s/w pt informing her we did receive her medication request and will forward it over to Dr. Rojas. //GLENN

## 2019-08-05 NOTE — Clinical Note
Severe Obstructive Sleep apnea(NILS): overall AHI was 53.8 events per hour. REM AHI was 105.2 eventsper hour.EKG showed no cardiac abnormalities.Moderate Oxygen DesaturationThe patient snored with moderate snoring volume.No significant periodic leg movements(PLMs) during sleep.Reduced total sleep time may underestimate the true severity of her sleep disorder breathing.Obstructive Sleep Apnea (G47.33)Shannan Luther - 1950Page 2 of 3Electronically Authenticated By Joel Munguia MD on 08/09/2019 01:25 PM, from [phone] Munguia MDNPI: [id number] Hypoxemia (G47.36)Circardian rhythm disorderInsomnia related to Sleep initiation/sleep maintaince/terminalTherapeutic CPAP titration to determine optimal pressure required to alleviate sleep disordered breathing.Sleep hygiene should be reviewed to assess factors that may improve sleep quality. Avoid electronics inbed.Weight management and regular exercise shoul

## 2019-08-06 RX ORDER — FUROSEMIDE 20 MG/1
TABLET ORAL
Qty: 30 TABLET | Refills: 0 | Status: SHIPPED | OUTPATIENT
Start: 2019-08-06 | End: 2019-10-03 | Stop reason: SDUPTHER

## 2019-08-08 ENCOUNTER — OFFICE VISIT (OUTPATIENT)
Dept: HEMATOLOGY/ONCOLOGY | Facility: CLINIC | Age: 69
End: 2019-08-08
Payer: COMMERCIAL

## 2019-08-08 ENCOUNTER — LAB VISIT (OUTPATIENT)
Dept: LAB | Facility: HOSPITAL | Age: 69
End: 2019-08-08
Attending: INTERNAL MEDICINE
Payer: COMMERCIAL

## 2019-08-08 ENCOUNTER — SOCIAL WORK (OUTPATIENT)
Dept: HEMATOLOGY/ONCOLOGY | Facility: CLINIC | Age: 69
End: 2019-08-08

## 2019-08-08 DIAGNOSIS — Z17.0 MALIGNANT NEOPLASM OF UPPER-OUTER QUADRANT OF RIGHT BREAST IN FEMALE, ESTROGEN RECEPTOR POSITIVE: Primary | Chronic | ICD-10-CM

## 2019-08-08 DIAGNOSIS — C50.411 MALIGNANT NEOPLASM OF UPPER-OUTER QUADRANT OF RIGHT BREAST IN FEMALE, ESTROGEN RECEPTOR POSITIVE: Chronic | ICD-10-CM

## 2019-08-08 DIAGNOSIS — R51.9 NONINTRACTABLE HEADACHE, UNSPECIFIED CHRONICITY PATTERN, UNSPECIFIED HEADACHE TYPE: ICD-10-CM

## 2019-08-08 DIAGNOSIS — C50.411 MALIGNANT NEOPLASM OF UPPER-OUTER QUADRANT OF RIGHT BREAST IN FEMALE, ESTROGEN RECEPTOR POSITIVE: Primary | Chronic | ICD-10-CM

## 2019-08-08 DIAGNOSIS — Z17.0 MALIGNANT NEOPLASM OF UPPER-OUTER QUADRANT OF RIGHT BREAST IN FEMALE, ESTROGEN RECEPTOR POSITIVE: Chronic | ICD-10-CM

## 2019-08-08 LAB
ALBUMIN SERPL BCP-MCNC: 3.2 G/DL (ref 3.5–5.2)
ALP SERPL-CCNC: 114 U/L (ref 55–135)
ALT SERPL W/O P-5'-P-CCNC: 9 U/L (ref 10–44)
ANION GAP SERPL CALC-SCNC: 13 MMOL/L (ref 8–16)
AST SERPL-CCNC: 12 U/L (ref 10–40)
BASOPHILS # BLD AUTO: 0.04 K/UL (ref 0–0.2)
BASOPHILS NFR BLD: 0.4 % (ref 0–1.9)
BILIRUB SERPL-MCNC: 0.4 MG/DL (ref 0.1–1)
BUN SERPL-MCNC: 20 MG/DL (ref 8–23)
CALCIUM SERPL-MCNC: 9.7 MG/DL (ref 8.7–10.5)
CHLORIDE SERPL-SCNC: 107 MMOL/L (ref 95–110)
CO2 SERPL-SCNC: 23 MMOL/L (ref 23–29)
CREAT SERPL-MCNC: 1.3 MG/DL (ref 0.5–1.4)
DIFFERENTIAL METHOD: ABNORMAL
EOSINOPHIL # BLD AUTO: 0.3 K/UL (ref 0–0.5)
EOSINOPHIL NFR BLD: 2.5 % (ref 0–8)
ERYTHROCYTE [DISTWIDTH] IN BLOOD BY AUTOMATED COUNT: 17.1 % (ref 11.5–14.5)
EST. GFR  (AFRICAN AMERICAN): 49 ML/MIN/1.73 M^2
EST. GFR  (NON AFRICAN AMERICAN): 43 ML/MIN/1.73 M^2
GLUCOSE SERPL-MCNC: 107 MG/DL (ref 70–110)
HCT VFR BLD AUTO: 35.4 % (ref 37–48.5)
HGB BLD-MCNC: 11.1 G/DL (ref 12–16)
LYMPHOCYTES # BLD AUTO: 1.7 K/UL (ref 1–4.8)
LYMPHOCYTES NFR BLD: 17.3 % (ref 18–48)
MCH RBC QN AUTO: 24 PG (ref 27–31)
MCHC RBC AUTO-ENTMCNC: 31.4 G/DL (ref 32–36)
MCV RBC AUTO: 77 FL (ref 82–98)
MONOCYTES # BLD AUTO: 0.6 K/UL (ref 0.3–1)
MONOCYTES NFR BLD: 5.6 % (ref 4–15)
NEUTROPHILS # BLD AUTO: 7.5 K/UL (ref 1.8–7.7)
NEUTROPHILS NFR BLD: 74.4 % (ref 38–73)
PLATELET # BLD AUTO: 282 K/UL (ref 150–350)
PMV BLD AUTO: 9.1 FL (ref 9.2–12.9)
POTASSIUM SERPL-SCNC: 4.4 MMOL/L (ref 3.5–5.1)
PROT SERPL-MCNC: 7.5 G/DL (ref 6–8.4)
RBC # BLD AUTO: 4.62 M/UL (ref 4–5.4)
SODIUM SERPL-SCNC: 143 MMOL/L (ref 136–145)
WBC # BLD AUTO: 10.04 K/UL (ref 3.9–12.7)

## 2019-08-08 PROCEDURE — 36415 COLL VENOUS BLD VENIPUNCTURE: CPT

## 2019-08-08 PROCEDURE — 99215 PR OFFICE/OUTPT VISIT, EST, LEVL V, 40-54 MIN: ICD-10-PCS | Mod: S$GLB,,, | Performed by: INTERNAL MEDICINE

## 2019-08-08 PROCEDURE — 80053 COMPREHEN METABOLIC PANEL: CPT

## 2019-08-08 PROCEDURE — 99215 OFFICE O/P EST HI 40 MIN: CPT | Mod: S$GLB,,, | Performed by: INTERNAL MEDICINE

## 2019-08-08 PROCEDURE — 99999 PR PBB SHADOW E&M-EST. PATIENT-LVL II: CPT | Mod: PBBFAC,,, | Performed by: INTERNAL MEDICINE

## 2019-08-08 PROCEDURE — 1101F PT FALLS ASSESS-DOCD LE1/YR: CPT | Mod: CPTII,S$GLB,, | Performed by: INTERNAL MEDICINE

## 2019-08-08 PROCEDURE — 85025 COMPLETE CBC W/AUTO DIFF WBC: CPT

## 2019-08-08 PROCEDURE — 99999 PR PBB SHADOW E&M-EST. PATIENT-LVL II: ICD-10-PCS | Mod: PBBFAC,,, | Performed by: INTERNAL MEDICINE

## 2019-08-08 PROCEDURE — 1101F PR PT FALLS ASSESS DOC 0-1 FALLS W/OUT INJ PAST YR: ICD-10-PCS | Mod: CPTII,S$GLB,, | Performed by: INTERNAL MEDICINE

## 2019-08-08 RX ORDER — DULOXETIN HYDROCHLORIDE 20 MG/1
20 CAPSULE, DELAYED RELEASE ORAL DAILY
Qty: 30 CAPSULE | Refills: 2 | Status: SHIPPED | OUTPATIENT
Start: 2019-08-08 | End: 2019-09-09

## 2019-08-08 RX ORDER — MORPHINE SULFATE 15 MG/1
15 TABLET ORAL EVERY 6 HOURS PRN
Qty: 40 TABLET | Refills: 0 | Status: SHIPPED | OUTPATIENT
Start: 2019-08-08 | End: 2019-10-31 | Stop reason: SDUPTHER

## 2019-08-08 RX ORDER — TRAMADOL HYDROCHLORIDE 50 MG/1
50 TABLET ORAL EVERY 6 HOURS PRN
Qty: 50 TABLET | Refills: 0 | Status: SHIPPED | OUTPATIENT
Start: 2019-08-08 | End: 2019-09-09 | Stop reason: SDUPTHER

## 2019-08-08 NOTE — PROGRESS NOTES
Subjective:       Patient ID: Susu Luther is a 69 y.o. female.    Chief Complaint: Malignant neoplasm of upper-outer quadrant of right breast in female, estrogen receptor positive [C50.411, Z17.0]  HPI: We have an opportunity to see Ms. Susu Luther in Hematology Oncology clinic at [facility] on 08/08/2019.  Ms. Susu Luther is a 69 y.o. woman with pT2N0 invasive lobular breast cancer ER positive [place] negative Her2 negative s/p lumpectomy and sentinel node biopsy.       Was found right breast mass on self breast exam.  Screening mammogram on 2/26/2019 showed Impression:  Right  Mass: Right breast mass at the lower outer middle position. Assessment: 0 - Incomplete. Special Views: Spot Compression View and Ultrasound are recommended.      Left  There is no mammographic evidence of malignancy.     On March 4, 2019, underwent US guided biopsy showed FINAL PATHOLOGIC DIAGNOSIS  1. BREAST, RIGHT, LOWER OUTER 08:00, ULTRASOUND-GUIDED BIOPSY:  Invasive lobular carcinoma of breast.  Size of invasive carcinoma: 19 mm in greatest linear dimension within a single core biopsy fragment.  [place] Histologic Score: Grade 2 of 3.  Tubule formation: 3  Nuclear pleomorphism: 2  Mitoses: 1  Associated lobular carcinoma in situ (LCIS) is present.  No lymphovascular or perineural invasion.  Breast biomarkers are pending and will be included in the supplemental report.  2. AXILLA, RIGHT LYMPH NODES, ULTRASOUND-GUIDED BIOPSY:  Benign lymph node without evidence of metastatic carcinoma.  Immunohistochemical stains (Cam 5.2 and CK7) are confirmatory.     On March 27, 2019 underwent lumpectomy with sentinel node.  Pathology showed   PROCEDURE: Excision/lumpectomy  SPECIMEN LATERALITY: Right breast  TUMOR SITE: 8 o'clock  TUMOR SIZE: Approximately 4.0 x 3.0 x 3.0 cm  HISTOLOGIC TYPE: Invasive lobular carcinoma  HISTOLOGIC GRADE: Grade 2 of 3  Tubular differentiation: 3  Nuclear pleomorphism: 2  Mitotic rate:  1  TUMOR FOCALITY: Single focus of invasive carcinoma  DUCTAL CARCINOMA IN SITU: Not identified  LOBULAR CARCINOMA IN SITU: Present  MARGINS:  Main specimen (#6) : Carcinoma present at superior, deep and medial margins  Distance from inferior margin: 5 mm  Distance from lateral margin: greater than 20 mm  Distance of anterior margin: 15 mm  Additionally submitted margins (specimen #7): Distance from newly designated margin: 4 mm  REGIONAL LYMPH NODES: Uninvolved tumor cells  Number of total lymph nodes examined: 8  Number of sentinel nodes examined: 4  TREATMENT EFFECT: No known presurgical therapy  LYMPH-VASCULAR INVASION: Not identified  ANCILLARY STUDIES (performed on patient's previous biopsy MS ):  ER: Positive (95% of tumor cells)  NV: Negative (less than 1 % of tumor cells)  Her2 by FISH: Negative (not amplified)  Ki-67%: Positive (70 % of tumor cells)  ADDITIONAL FINDINGS: Apocrine metaplasia, usual ductal hyperplasia, fibroadenomatoid change  PATHOLOGIC STAGE CLASSIFICATION: pT2 pN0     Oncotype type DX recurrence score 22, with distant recurrence risk 8%, absolute chemotherapy benefit <1%.     Completed adjuvant radiation.  Has right breast tenderness.  Started on anastrozole.  Has arthralgias.             Has had heachaches, CT head showed fluid collection in left mastoid.  Pain in right breast, US negative for collection.       Malignant neoplasm of upper-outer quadrant of right breast in female, estrogen receptor positive    3/14/2019 Initial Diagnosis     Malignant neoplasm of upper-outer quadrant of right breast in female, estrogen receptor positive         3/27/2019 Cancer Staged     Staging form: Breast, AJCC 8th Edition  - Pathologic stage from 3/27/2019: Stage IIA (pT2, pN0(sn), cM0, G2, ER+, NV-, HER2-) - Signed by Guido Huynh MD on 4/4/2019 5/20/2019 - 6/18/2019 Radiation Therapy     Treatment Site Ref. ID Energy Dose/Fx (Gy) #Fx Dose Correction (Gy) Total Dose (Gy) Start Date  End Date Elapsed Days   Boost Boost 18X 2.5 4 / 4 0 10 6/13/2019 6/18/2019 5   RtBreastAddMV Rt Breast 18X/6X 2.66 16 / 16 0 42.56 5/20/2019 6/12/2019 23               Past Medical History:   Diagnosis Date    Encounter for blood transfusion     Hypertension     Malignant neoplasm of upper-outer quadrant of right breast in female, estrogen receptor positive 3/14/2019    Stroke 2009    no residual defect     Family History   Problem Relation Age of Onset    Diabetes Maternal Grandmother     Diabetes Maternal Grandfather     Diabetes Mother     Breast cancer Neg Hx     Colon cancer Neg Hx     Ovarian cancer Neg Hx      Social History     Socioeconomic History    Marital status:      Spouse name: Not on file    Number of children: Not on file    Years of education: Not on file    Highest education level: Not on file   Occupational History    Not on file   Social Needs    Financial resource strain: Not on file    Food insecurity:     Worry: Not on file     Inability: Not on file    Transportation needs:     Medical: Not on file     Non-medical: Not on file   Tobacco Use    Smoking status: Never Smoker    Smokeless tobacco: Never Used   Substance and Sexual Activity    Alcohol use: No    Drug use: No    Sexual activity: Yes     Partners: Male     Birth control/protection: Post-menopausal   Lifestyle    Physical activity:     Days per week: Not on file     Minutes per session: Not on file    Stress: Not on file   Relationships    Social connections:     Talks on phone: Not on file     Gets together: Not on file     Attends Nondenominational service: Not on file     Active member of club or organization: Not on file     Attends meetings of clubs or organizations: Not on file     Relationship status: Not on file   Other Topics Concern    Not on file   Social History Narrative    Not on file     Past Surgical History:   Procedure Laterality Date    APPENDECTOMY      BIOPSY, LYMPH NODE, SENTINEL  Right 3/27/2019    [facility]     SECTION      x 1    LUMPECTOMY, BREAST Right 3/27/2019    Per[facility] at [facility] OR    OOPHORECTOMY      right      Current Outpatient Medications   Medication Sig Dispense Refill    albuterol (VENTOLIN HFA) 90 mcg/actuation inhaler Inhale 2 puffs into the lungs every 4 (four) hours as needed for Shortness of Breath. 18 g 11    anastrozole (ARIMIDEX) 1 mg Tab Take 1 tablet (1 mg total) by mouth once daily. 30 tablet 11    aspirin (ECOTRIN) 81 MG EC tablet Take 1 tablet (81 mg total) by mouth once daily.  0    atorvastatin (LIPITOR) 40 MG tablet Take 1 tablet (40 mg total) by mouth once daily. 90 tablet 3    blood pressure kit med and lrg Kit Take blood pressure first thing in the morning. 1 each 0    butalbital-acetaminophen-caff -40 mg -40 mg Cap Take 1 tablet by mouth 3 (three) times daily. 30 capsule 0    cholecalciferol, vitamin D3, 50,000 unit capsule Take 50,000 Units by mouth once a week.   0    cloNIDine (CATAPRES) 0.1 MG tablet Take 1 tablet (0.1 mg total) by mouth 3 (three) times daily. 90 tablet 1    diazePAM (VALIUM) 10 MG Tab Take 1 tablet (10 mg total) by mouth 2 (two) times daily. 30 tablet 1    emollient combination no.63 (MIADERM) Lotn Apply 1 application topically 3 (three) times daily.      furosemide (LASIX) 20 MG tablet take 1/2 tablet by mouth a day. 30 tablet 0    gabapentin (NEURONTIN) 300 MG capsule Take 1 capsule (300 mg total) by mouth 3 (three) times daily. One capsule on the 1st day, 2 capsules on the 2nd day and then t.i.d. 90 capsule 6    hydroCHLOROthiazide (MICROZIDE) 12.5 mg capsule Take 1 capsule (12.5 mg total) by mouth once daily. 30 capsule 8    inhalation spacing device (COMPACT SPACE CHAMBER) Use as directed for inhalation. 1 Device 0    losartan (COZAAR) 50 MG tablet Take 1 tablet (50 mg total) by mouth once daily. 90 tablet 3    morphine (MSIR) 15 MG  tablet Take 1 tablet (15 mg total) by mouth every 6 (six) hours as needed for Pain. 20 tablet 0    naproxen (NAPROSYN) 500 MG tablet Take 1 tablet (500 mg total) by mouth 2 (two) times daily with meals. 60 tablet 0    OLANZapine (ZYPREXA) 10 MG tablet Take 10 mg by mouth nightly.      ondansetron (ZOFRAN-ODT) 8 MG TbDL Take 1 tablet (8 mg total) by mouth every 8 (eight) hours as needed (nausea). 60 tablet 1    prochlorperazine (COMPAZINE) 10 MG tablet Take 1 tablet (10 mg total) by mouth 4 (four) times daily as needed (nausea). 60 tablet 1    propranolol (INDERAL) 40 MG tablet Take 1 tablet (40 mg total) by mouth 2 (two) times daily. 60 tablet 11    traMADol (ULTRAM) 50 mg tablet Take 1 tablet (50 mg total) by mouth every 6 (six) hours as needed for Pain. 50 tablet 0     No current facility-administered medications for this visit.        Labs:  Lab Results   Component Value Date    WBC 8.80 06/03/2019    HGB 10.5 (L) 06/03/2019    HCT 33.9 (L) 06/03/2019    MCV 79 (L) 06/03/2019     06/03/2019     BMP  Lab Results   Component Value Date     06/03/2019    K 4.4 06/03/2019     06/03/2019    CO2 25 06/03/2019    BUN 13 06/03/2019    CREATININE 1.2 06/03/2019    CALCIUM 9.3 06/03/2019    ANIONGAP 11 06/03/2019    ESTGFRAFRICA 54 (A) 06/03/2019    EGFRNONAA 47 (A) 06/03/2019     Lab Results   Component Value Date    ALT 13 06/03/2019    AST 17 06/03/2019    ALKPHOS 127 06/03/2019    BILITOT 0.2 06/03/2019       No results found for: IRON, TIBC, FERRITIN, SATURATEDIRO  No results found for: JYGJPLPU07  No results found for: FOLATE  Lab Results   Component Value Date    TSH 0.799 05/18/2019       I have reviewed the radiology reports and examined the scan/xray images.    Review of Systems   Constitutional: Negative.    HENT: Negative.    Eyes: Negative.    Respiratory: Negative.    Cardiovascular: Negative.    Gastrointestinal: Negative.    Endocrine: Negative.    Genitourinary: Negative.     Musculoskeletal: Negative.    Skin: Negative.    Allergic/Immunologic: Negative.    Neurological: Negative.    Hematological: Negative.    Psychiatric/Behavioral: Negative.      ECOG SCORE    0 - Fully active-able to carry on all pre-disease performance without restriction            Objective:   There were no vitals filed for this visit.There is no height or weight on file to calculate BMI.  Physical Exam   Constitutional: She is oriented to person, place, and time. She appears well-developed and well-nourished.   HENT:   Head: Normocephalic and atraumatic.   Eyes: Conjunctivae and EOM are normal.   Neck: Normal range of motion. Neck supple.   Cardiovascular: Normal rate and regular rhythm.   Pulmonary/Chest: Effort normal and breath sounds normal.   Abdominal: Soft. Bowel sounds are normal.   Musculoskeletal: Normal range of motion.   Neurological: She is alert and oriented to person, place, and time.   Skin: Skin is warm and dry.   Psychiatric: She has a normal mood and affect. Her behavior is normal. Judgment and thought content normal.   Nursing note and vitals reviewed.        Assessment:      1. Malignant neoplasm of upper-outer quadrant of right breast in female, estrogen receptor positive    2. Nonintractable headache, unspecified chronicity pattern, unspecified headache type           Plan:     Malignant neoplasm of upper-outer quadrant of right breast in female, estrogen receptor positive    Will hold anastrozole for 2 weeks.  If symptoms resolve, will start letrozole.  -     traMADol (ULTRAM) 50 mg tablet; Take 1 tablet (50 mg total) by mouth every 6 (six) hours as needed for Pain.  Dispense: 50 tablet; Refill: 0  -     morphine (MSIR) 15 MG tablet; Take 1 tablet (15 mg total) by mouth every 6 (six) hours as needed for Pain.  Dispense: 40 tablet; Refill: 0  -     DULoxetine (CYMBALTA) 20 MG capsule; Take 1 capsule (20 mg total) by mouth once daily.  Dispense: 30 capsule; Refill: 2    Breast pain  ?  scarring  -     Ambulatory consult to General Surgery  -     Ambulatory referral to Radiation Oncology    Nonintractable headache, unspecified chronicity pattern, unspecified headache type    Refer to see ENT for evaluation of left mastoid process seen on CT [place]

## 2019-08-08 NOTE — PROGRESS NOTES
SW was asked to see pt in exam room as she became tearful during rooming. Pt expressed her displeasure in the amount of (or lack thereof) support throughout her cancer journey. Pt reported she feels abandoned by her medical team, including supportive services. SW listened, validated and apologized to pt for not providing her the services she needs. Pt explained she is not sure exactly what she needs, but she feels like a burden on her . Pt explained they have been  for 50 years and had a blessed life together, but she feels she needs to connect with others who can relate to her emotional struggles. SW provided her with flyers and brochures on [facility] and [employer] (ACS) for both support groups and one-on-one peer support. SW also presented pt was a CTC bag which she seemed to really love. Pt expressed feeling much better and SW encouraged her to always reach out to us if she needs anything, even if it is someone to listen to her for a little while. Pt verbalized understanding and will look for ACS and [employer] to reach out to her. SW will f/u when pt RTC.

## 2019-08-08 NOTE — TELEPHONE ENCOUNTER
Per office visit today, the patient was instructed to hold anastrozole x 2 weeks then RTC on 8/22. Called the patient and confirmed she will hold the medication. Encouraged her to reach out with any questions/concerns but will otherwise f/u after upcoming appt.

## 2019-08-09 DIAGNOSIS — G47.33 OSA (OBSTRUCTIVE SLEEP APNEA): Primary | ICD-10-CM

## 2019-08-09 NOTE — PROCEDURES
"Severe Obstructive Sleep apnea(NILS): overall AHI was 53.8 events per hour. REM AHI was 105.2 events  per hour.  EKG showed no cardiac abnormalities.  Moderate Oxygen Desaturation  The patient snored with moderate snoring volume.  No significant periodic leg movements(PLMs) during sleep.  Reduced total sleep time may underestimate the true severity of her sleep disorder breathing.  Obstructive Sleep Apnea (G47.33)  Susu Luther - 1950  Page 2 of 3  Electronically Authenticated By Joel Munguia MD on 08/09/2019 01:25 PM, from [phone]  Joel Munguia MD  NPI: [id number]  RECOMMENDATIONS  MISCELLANEOUS  Nocturnal Hypoxemia (G47.36)  Circardian rhythm disorder  Insomnia related to Sleep initiation/sleep maintaince/terminal  Therapeutic CPAP titration to determine optimal pressure required to alleviate sleep disordered breathing.  Sleep hygiene should be reviewed to assess factors that may improve sleep quality. Avoid electronics in  bed.  Weight management and regular exercise should be initiated or continued.  Melatonin after dusk may be considered for Circadian rhythm disorder.    See imported Sleep Study result in "Chart Review" under the   "Media tab".      (This Sleep Study was interpreted by a Board Certified Sleep   Specialist who conducted an epoch-by-epoch review of the entire   raw data recording.)     (The indication for this sleep study was reviewed and deemed   appropriate by [employer] Practice Parameters or other reasons by a   Board Certified Sleep Specialist.)    Joel Munguia MD      "

## 2019-08-10 NOTE — PROGRESS NOTES
Orders Placed This Encounter   Procedures    CPAP Titration (Must have dx of NILS from previous sleep study.)     Standing Status:   Future     Standing Expiration Date:   8/9/2020     1. NILS (obstructive sleep apnea)  CPAP Titration (Must have dx of NILS from previous sleep study.)       08/05/2019 PSG   Severe Obstructive Sleep apnea(NILS): overall AHI was 53.8 events per hour. REM AHI was 105.2 events  per hour.  EKG showed no cardiac abnormalities.  Moderate Oxygen Desaturation  The patient snored with moderate snoring volume.  No significant periodic leg movements(PLMs) during sleep.  Reduced total sleep time may underestimate the true severity of her sleep disorder breathing.    There were a total of 160 respiratory disturbances out of which 41 were apneas ( 41 obstructive, 0 mixed, 0 central)  and 119 hypopneas. The apnea/hypopnea index (AHI) was 53.5 events/hour. The central sleep apnea index was  0.0 events/hour. The REM AHI was 105.2 events/hour and NREM AHI was 40.3 events/hour. The supine AHI was  53.5 events/hour and the non supine AHI was 0 supine during 100.00% of sleep. Respiratory disturbances were  associated with oxygen desaturation down to a marcia of 69.0% during sleep. The mean oxygen saturation during the  study was 91.1%. The cumulative time under 88% oxygen saturation was 26.8 minutes.    Obstructive Sleep Apnea (G47.33)  Nocturnal Hypoxemia (G47.36)  Circardian rhythm disorder  Insomnia related to Sleep initiation/sleep maintaince/terminal  Therapeutic CPAP titration to determine optimal pressure required to alleviate sleep disordered breathing.

## 2019-08-12 ENCOUNTER — TELEPHONE (OUTPATIENT)
Dept: PULMONOLOGY | Facility: CLINIC | Age: 69
End: 2019-08-12

## 2019-08-12 ENCOUNTER — TELEPHONE (OUTPATIENT)
Dept: RADIATION ONCOLOGY | Facility: CLINIC | Age: 69
End: 2019-08-12

## 2019-08-12 NOTE — TELEPHONE ENCOUNTER
----- Message from Blank Cortez NP sent at 8/9/2019  7:30 PM CDT -----  Please call patient advise: Sleep study revealed sleep apnea. The sleep lab staff will call her to schedule your CPAP titration.  Please mail out letter with results.  Thank you

## 2019-08-12 NOTE — TELEPHONE ENCOUNTER
Phoned patient to see about rescheduling the follow up appt with Dr Arreguin. Patient declined my offer to reschedule the appt she said she will call back to reschedule later.

## 2019-08-23 ENCOUNTER — TELEPHONE (OUTPATIENT)
Dept: HEMATOLOGY/ONCOLOGY | Facility: CLINIC | Age: 69
End: 2019-08-23

## 2019-08-23 ENCOUNTER — TELEPHONE (OUTPATIENT)
Dept: PHARMACY | Facility: CLINIC | Age: 69
End: 2019-08-23

## 2019-08-23 NOTE — TELEPHONE ENCOUNTER
Spoke to pt regarding her missed appt and she states she's not feeling well, her sister passed away and she's just not feeling good. SOB and pain in the breast area. Pt encourage to take prescribed anti-anxiety and be sure to keep f/u appt with Dr. Starks. Appt with Evan Huynh rescheduled.

## 2019-08-23 NOTE — TELEPHONE ENCOUNTER
----- Message from Avani Ch sent at 8/23/2019  1:08 PM CDT -----  Contact: pt  .Type:  Sooner Apoointment Request    Caller is requesting a sooner appointment.  Caller declined first available appointment listed below.  Caller will not accept being placed on the waitlist and is requesting a message be sent to doctor.  Name of Caller: pt  When is the first available appointment? 8/26 @ 4pm   Symptoms: check up  Would the patient rather a call back or a response via [facility]?  Call back   Best Call Back Number: [phone] (home) [phone]   Additional Information:  Pt requesting an early time close to other appt scheduled 8/26

## 2019-08-23 NOTE — TELEPHONE ENCOUNTER
Pt called and stated her sister passed and she has been experiencing increased anxiety. Valium refill request sent to Dr. Rojas and appt rescheduled to see MARCIA Saunders on Monday.

## 2019-08-23 NOTE — TELEPHONE ENCOUNTER
Reached out to patient and she asked if it has been 2 weeks since she was holding her anastrozole. I confirmed that yes, at her appt on 8/8 she was told to hold and it has been about 2 weeks. She has not taken her medication today and confirmed that she would reach out to her provider to confirm that she can restart. She was due to be seen by Dr. Huynh on 8/22, but the appt was cancelled. She confirms that she has #10 tablets on hand and would like to set up her refill for her to receive on 8/30. Address confirmed. $0.00. Medication list reviewed. No new allergies or health conditions. Patient was extremely rushed on the phone and stated that she has been very busy. She did not wish to talk about her medication at this time. I have reached out to the provider to confirm that patient should be restarting her therapy and if it is appropriate for [facility] to set up her refill.

## 2019-08-26 ENCOUNTER — OFFICE VISIT (OUTPATIENT)
Dept: RADIATION ONCOLOGY | Facility: CLINIC | Age: 69
End: 2019-08-26
Payer: COMMERCIAL

## 2019-08-26 ENCOUNTER — TELEPHONE (OUTPATIENT)
Dept: HEMATOLOGY/ONCOLOGY | Facility: CLINIC | Age: 69
End: 2019-08-26

## 2019-08-26 ENCOUNTER — OFFICE VISIT (OUTPATIENT)
Dept: HEMATOLOGY/ONCOLOGY | Facility: CLINIC | Age: 69
End: 2019-08-26
Payer: COMMERCIAL

## 2019-08-26 VITALS
TEMPERATURE: 98 F | DIASTOLIC BLOOD PRESSURE: 96 MMHG | HEART RATE: 67 BPM | BODY MASS INDEX: 43.4 KG/M2 | WEIGHT: 293 LBS | SYSTOLIC BLOOD PRESSURE: 154 MMHG | HEIGHT: 69 IN | RESPIRATION RATE: 18 BRPM | OXYGEN SATURATION: 97 %

## 2019-08-26 VITALS
DIASTOLIC BLOOD PRESSURE: 96 MMHG | HEART RATE: 67 BPM | OXYGEN SATURATION: 97 % | WEIGHT: 293 LBS | TEMPERATURE: 98 F | RESPIRATION RATE: 18 BRPM | SYSTOLIC BLOOD PRESSURE: 154 MMHG | BODY MASS INDEX: 43.4 KG/M2 | HEIGHT: 69 IN

## 2019-08-26 DIAGNOSIS — C50.411 MALIGNANT NEOPLASM OF UPPER-OUTER QUADRANT OF RIGHT BREAST IN FEMALE, ESTROGEN RECEPTOR POSITIVE: Primary | Chronic | ICD-10-CM

## 2019-08-26 DIAGNOSIS — Z17.0 MALIGNANT NEOPLASM OF UPPER-OUTER QUADRANT OF RIGHT BREAST IN FEMALE, ESTROGEN RECEPTOR POSITIVE: Primary | Chronic | ICD-10-CM

## 2019-08-26 DIAGNOSIS — Z79.811 AROMATASE INHIBITOR USE: ICD-10-CM

## 2019-08-26 DIAGNOSIS — C50.411 MALIGNANT NEOPLASM OF UPPER-OUTER QUADRANT OF RIGHT BREAST IN FEMALE, ESTROGEN RECEPTOR POSITIVE: Chronic | ICD-10-CM

## 2019-08-26 DIAGNOSIS — N64.4 MASTALGIA: Primary | ICD-10-CM

## 2019-08-26 DIAGNOSIS — Z17.0 MALIGNANT NEOPLASM OF UPPER-OUTER QUADRANT OF RIGHT BREAST IN FEMALE, ESTROGEN RECEPTOR POSITIVE: Chronic | ICD-10-CM

## 2019-08-26 PROCEDURE — 3077F SYST BP >= 140 MM HG: CPT | Mod: CPTII,S$GLB,, | Performed by: NURSE PRACTITIONER

## 2019-08-26 PROCEDURE — 3077F PR MOST RECENT SYSTOLIC BLOOD PRESSURE >= 140 MM HG: ICD-10-PCS | Mod: CPTII,S$GLB,, | Performed by: RADIOLOGY

## 2019-08-26 PROCEDURE — 99214 OFFICE O/P EST MOD 30 MIN: CPT | Mod: S$GLB,,, | Performed by: NURSE PRACTITIONER

## 2019-08-26 PROCEDURE — 3077F SYST BP >= 140 MM HG: CPT | Mod: CPTII,S$GLB,, | Performed by: RADIOLOGY

## 2019-08-26 PROCEDURE — 99999 PR PBB SHADOW E&M-EST. PATIENT-LVL V: ICD-10-PCS | Mod: PBBFAC,,, | Performed by: RADIOLOGY

## 2019-08-26 PROCEDURE — 99214 PR OFFICE/OUTPT VISIT, EST, LEVL IV, 30-39 MIN: ICD-10-PCS | Mod: S$GLB,,, | Performed by: NURSE PRACTITIONER

## 2019-08-26 PROCEDURE — 3080F DIAST BP >= 90 MM HG: CPT | Mod: CPTII,S$GLB,, | Performed by: RADIOLOGY

## 2019-08-26 PROCEDURE — 99999 PR PBB SHADOW E&M-EST. PATIENT-LVL III: CPT | Mod: PBBFAC,,, | Performed by: NURSE PRACTITIONER

## 2019-08-26 PROCEDURE — 99999 PR PBB SHADOW E&M-EST. PATIENT-LVL V: CPT | Mod: PBBFAC,,, | Performed by: RADIOLOGY

## 2019-08-26 PROCEDURE — 3080F DIAST BP >= 90 MM HG: CPT | Mod: CPTII,S$GLB,, | Performed by: NURSE PRACTITIONER

## 2019-08-26 PROCEDURE — 3077F PR MOST RECENT SYSTOLIC BLOOD PRESSURE >= 140 MM HG: ICD-10-PCS | Mod: CPTII,S$GLB,, | Performed by: NURSE PRACTITIONER

## 2019-08-26 PROCEDURE — 1101F PR PT FALLS ASSESS DOC 0-1 FALLS W/OUT INJ PAST YR: ICD-10-PCS | Mod: CPTII,S$GLB,, | Performed by: RADIOLOGY

## 2019-08-26 PROCEDURE — 1101F PR PT FALLS ASSESS DOC 0-1 FALLS W/OUT INJ PAST YR: ICD-10-PCS | Mod: CPTII,S$GLB,, | Performed by: NURSE PRACTITIONER

## 2019-08-26 PROCEDURE — 3080F PR MOST RECENT DIASTOLIC BLOOD PRESSURE >= 90 MM HG: ICD-10-PCS | Mod: CPTII,S$GLB,, | Performed by: NURSE PRACTITIONER

## 2019-08-26 PROCEDURE — 1101F PT FALLS ASSESS-DOCD LE1/YR: CPT | Mod: CPTII,S$GLB,, | Performed by: NURSE PRACTITIONER

## 2019-08-26 PROCEDURE — 99215 PR OFFICE/OUTPT VISIT, EST, LEVL V, 40-54 MIN: ICD-10-PCS | Mod: S$GLB,,, | Performed by: RADIOLOGY

## 2019-08-26 PROCEDURE — 99999 PR PBB SHADOW E&M-EST. PATIENT-LVL III: ICD-10-PCS | Mod: PBBFAC,,, | Performed by: NURSE PRACTITIONER

## 2019-08-26 PROCEDURE — 1101F PT FALLS ASSESS-DOCD LE1/YR: CPT | Mod: CPTII,S$GLB,, | Performed by: RADIOLOGY

## 2019-08-26 PROCEDURE — 99215 OFFICE O/P EST HI 40 MIN: CPT | Mod: S$GLB,,, | Performed by: RADIOLOGY

## 2019-08-26 PROCEDURE — 3080F PR MOST RECENT DIASTOLIC BLOOD PRESSURE >= 90 MM HG: ICD-10-PCS | Mod: CPTII,S$GLB,, | Performed by: RADIOLOGY

## 2019-08-26 RX ORDER — LETROZOLE 2.5 MG/1
2.5 TABLET, FILM COATED ORAL DAILY
Qty: 30 TABLET | Refills: 3 | Status: CANCELLED | OUTPATIENT
Start: 2019-08-26 | End: 2020-08-25

## 2019-08-26 RX ORDER — DIAZEPAM 10 MG/1
10 TABLET ORAL 2 TIMES DAILY
Qty: 30 TABLET | Refills: 1 | Status: SHIPPED | OUTPATIENT
Start: 2019-08-26 | End: 2019-10-08 | Stop reason: SDUPTHER

## 2019-08-26 RX ORDER — LETROZOLE 2.5 MG/1
2.5 TABLET, FILM COATED ORAL DAILY
Qty: 30 TABLET | Refills: 2 | Status: CANCELLED | OUTPATIENT
Start: 2019-08-26 | End: 2020-08-25

## 2019-08-26 RX ORDER — LETROZOLE 2.5 MG/1
2.5 TABLET, FILM COATED ORAL DAILY
Qty: 30 TABLET | Refills: 11 | Status: SHIPPED | OUTPATIENT
Start: 2019-08-26 | End: 2019-10-09 | Stop reason: SINTOL

## 2019-08-26 RX ORDER — NALOXONE HYDROCHLORIDE 4 MG/.1ML
SPRAY NASAL
Refills: 0 | COMMUNITY
Start: 2019-07-31 | End: 2021-12-29 | Stop reason: SDUPTHER

## 2019-08-26 RX ORDER — LOSARTAN POTASSIUM 100 MG/1
100 TABLET ORAL DAILY
Refills: 3 | COMMUNITY
Start: 2019-08-02 | End: 2020-07-18 | Stop reason: SDUPTHER

## 2019-08-26 NOTE — TELEPHONE ENCOUNTER
TIM contacted pt as requested by her radiation oncologist. Pt stated she is still struggling to cope emotionally with all of these changes since her diagnosis and treatment. Pt expressed openness to talking with psychiatry about changing/adjusting her medication and establishing counseling with LCSW. TIM informed pt that  will contact her within next few days to schedule appts with her. Pt knows to call SW if she has not heard from someone by week's end. SW will f/u as requested.

## 2019-08-26 NOTE — PROGRESS NOTES
[facility]  RADIATION ONCOLOGY FOLLOW UP    Name: Susu Luther : 1950     DIAGNOSIS: Right breast lower outer quadrant invasive lobular carcinoma pIIA: pT2 pN0(sn) cM0, ER positive, IL negative, her 2 Steff negative   1. 19 had a bilateral screening mammogram showing an irregular spiculated mass lower outer right breast middle depth.   2. 19 diagnostic mammogram confirmed the mass. Ultrasound showed 2 enlarged right axillary lymph nodes and a lower outer quadrant right breast mass measuring 3.8 cm maximum dimension at 8:00 position.   3. 3/4/19 right breast biopsy and clip and right lymph node biopsy and clip were performed. The axillary clip was not visualized on post biopsy imaging due to possible posterior depth of the node. The breast clip was in the expected position. The right breast returned invasive lobular carcinoma, grade 2 with no lymphovascular or perineural invasion, ER positive 95%, IL negative, her 2 2+, FISH non-amplified. The lymph nodes were benign.   4. 3/27/19 patient had a lumpectomy and sentinel lymph node biopsy. Gross analysis of the lumpectomy was concerning for positive margins of the medial, superior and inferior aspect therefore additional margins were taken. The final deep margin was the pectoralis fascia. The lumpectomy cavity was marked with 6 Ligaclips. Pathology contained 4 benign sentinel lymph nodes and 4 benign non sentinel lymph nodes. The lumpectomy contained invasive lobular carcinoma measuring 4 cm maximum dimension, grade 2. The main lumpectomy had positive margins superior, deep and medial and negative margins inferior, lateral and anterior. The additionally submitted margins had carcinoma 4 mm from the new margin. Final stage pT2 pN0(sn) cM0.   5. Oncotype DX 22.   6. 19 completed right breast 52.56 Gy radiation     CURRENT STATUS: Susu Luther is a pleasant 69 y.o. female who presents today for follow-up.  She is  "continuing Anastrozole.  She is having severe breast pain and tenderness of the right breast.  It is mainly under the arm in the right lateral breast and also the right shoulder with moving her shoulder.  This has been present since prior to starting radiation but has worsened in the past couple of months.  She also has a lot of fatigue, and lack of energy.  She has a very poor appetite, and has been losing weight.  Her medication listd Zyprexa, Cymbalta, Valium and she is not sure which ones she is taking but tells me I am my own psychiatrist.  She is taking tramadol for the breast pain which seems to help.  She does feel overwhelmed with all of her medical issues and does not want to burden anyone by complaining.    PHYSICAL EXAM:   Constitutional: well appearing, no acute distress, ECOG 2 - Ambulates, capable of self care only  Vitals:    BP (!) 154/96   Pulse 67   Temp 97.7 °F (36.5 °C)   Resp 18   Ht 5' 9" (1.753 m)   Wt (!) 144.6 kg (318 lb 12.8 oz)   LMP  (LMP Unknown)   SpO2 97%   BMI 47.08 kg/m²   Breast:  Right breast faint hyperpigmentation axilla and lateral breast.  No malignant skin changes on inspection.  Mild edema of the skin especially laterally.  Extremely tender to palpation of the lateral right breast and right axilla.  No discrete mass.  Medial right breast is mildly tender.  The left breast is completely nontender.    Laboratory & X-Ray Findings: Per above.      ASSESSMENT:  Severe right breast pain and tenderness.    PLAN:  We discussed that breast pain after breast conservation is relatively common and typically very mild and tolerable.  Severe cases are reported.  Risk factors are multifactorial and include technical aspects of surgery and radiation as well as obesity, fatigue, psychosocial factors such as depression and anxiety, lymphedema and others.  I explained to her that breast pain can typically decrease over time he can take months or even a year or 2.  Due to the complex " multifactorial nature she will need multiple providers and treatments in an attempt to control the pain.    I will send her to physical therapy as she does have some lymphedema of the right lateral breast and also very limited range of motion due to right shoulder pain with abduction.  I recommend she wear tighter breast support as this has been shown to decrease breast pain.  She can also discussed this with physical therapy.  I will send her back to general surgery for Dr. Starks so opinion.  She can continue the tramadol, studies have shown that Tylenol can be effective and I recommend she take 1000 mg 3 times a day.  She should also attempt to lose weight as obesity is a definite risk factor for breast pain after breast cancer treatment.  I will send her to Pain Management.  She may benefit from an increase in her Gabapentin but I will defer to Pain Management. Along the same lines, exercise will help with weight loss but also combat the severe fatigue she is having and I exercises also been shown to decrease pain.  I will send her to Psychiatry as she is on at least 1 depression/anxiety medicine and has 2 listed but she is unsure which she is taking and tells me today I am my own psychiatrist because she does not want to burden people with her symptoms.  She also takes Valium.  She tells me Dr. Rojas may have prescribe some of these medications, so I will copy him on my note, and contact him via epic.  Some studies have shown that cognitive behavioral therapy can help with postmastectomy pain so it may help in this situation.  I reached out to the  to give the patient a call and also help coordinate the psychiatry appointment.  I encouraged her to be patient since the symptoms will take a while to decreased.  I would like to see her back in 3 months.  I answered her questions to her apparent satisfaction.  We spent 40 min today in face-to-face time over half of which was counseling and  coordination of care.    JACQUE Arreguin M.D.  Radiation Oncology  [facility] [address] [place] [address]  Ph: [phone]  [email or web address]

## 2019-08-26 NOTE — PROGRESS NOTES
Subjective:      Patient ID: Susu Luther is a 69 y.o. female.    Chief Complaint: Nausea/diarrhea    HPI:  Patient is a 69 year old AA female who presents today for follow up of complaints of nausea/diarrhea while taking Arimidex for treatment of right sided ER +, MT -, HER 2 - cancer.  She is s/p lumpectomy and sentinel node biopsy and has completed radiation.  Arimidex was placed on hold for 2 weeks due to patient having intolerable nausea/diarrhea once starting the medication.  She states she did stop for 2 weeks and felt better, but restarted again on Thursday and now has the same complaints again - nausea/diarrhea.      Social History     Socioeconomic History    Marital status:      Spouse name: Not on file    Number of children: Not on file    Years of education: Not on file    Highest education level: Not on file   Occupational History    Not on file   Social Needs    Financial resource strain: Not on file    Food insecurity:     Worry: Not on file     Inability: Not on file    Transportation needs:     Medical: Not on file     Non-medical: Not on file   Tobacco Use    Smoking status: Never Smoker    Smokeless tobacco: Never Used   Substance and Sexual Activity    Alcohol use: No    Drug use: No    Sexual activity: Yes     Partners: Male     Birth control/protection: Post-menopausal   Lifestyle    Physical activity:     Days per week: Not on file     Minutes per session: Not on file    Stress: Not on file   Relationships    Social connections:     Talks on phone: Not on file     Gets together: Not on file     Attends Orthodox service: Not on file     Active member of club or organization: Not on file     Attends meetings of clubs or organizations: Not on file     Relationship status: Not on file   Other Topics Concern    Not on file   Social History Narrative    Not on file       Family History   Problem Relation Age of Onset    Diabetes Maternal Grandmother     Diabetes  Maternal Grandfather     Diabetes Mother     Breast cancer Neg Hx     Colon cancer Neg Hx     Ovarian cancer Neg Hx        Past Surgical History:   Procedure Laterality Date    APPENDECTOMY      BIOPSY, LYMPH NODE, SENTINEL Right 3/27/2019    [facility]     SECTION      x 1    LUMPECTOMY, BREAST Right 3/27/2019    [facility] at [facility]    OOPHORECTOMY      right        Past Medical History:   Diagnosis Date    Encounter for blood transfusion     Hypertension     Malignant neoplasm of upper-outer quadrant of right breast in female, estrogen receptor positive 3/14/2019    Stroke 2009    no residual defect       Review of Systems   Constitutional: Positive for fatigue.   HENT: Negative.    Eyes: Negative.    Respiratory: Negative.    Cardiovascular: Negative.    Gastrointestinal: Positive for diarrhea and nausea.   Endocrine: Negative.    Genitourinary: Negative.    Musculoskeletal: Negative.    Skin: Negative.    Allergic/Immunologic: Negative.    Neurological: Negative.    Hematological: Negative.    Psychiatric/Behavioral: Negative.           Medication List with Changes/Refills   New Medications    LETROZOLE (FEMARA) 2.5 MG TAB    Take 1 tablet (2.5 mg total) by mouth once daily.   Current Medications    ALBUTEROL (VENTOLIN HFA) 90 MCG/ACTUATION INHALER    Inhale 2 puffs into the lungs every 4 (four) hours as needed for Shortness of Breath.    ASPIRIN (ECOTRIN) 81 MG EC TABLET    Take 1 tablet (81 mg total) by mouth once daily.    ATORVASTATIN (LIPITOR) 40 MG TABLET    Take 1 tablet (40 mg total) by mouth once daily.    BLOOD PRESSURE KIT MED AND LRG KIT    Take blood pressure first thing in the morning.    BUTALBITAL-ACETAMINOPHEN-CAFF -40 MG -40 MG CAP    Take 1 tablet by mouth 3 (three) times daily.    CHOLECALCIFEROL, VITAMIN D3, 50,000 UNIT CAPSULE    Take 50,000 Units by mouth once a week.     CLONIDINE (CATAPRES) 0.1 MG  TABLET    Take 1 tablet (0.1 mg total) by mouth 3 (three) times daily.    DIAZEPAM (VALIUM) 10 MG TAB    Take 1 tablet (10 mg total) by mouth 2 (two) times daily.    DULOXETINE (CYMBALTA) 20 MG CAPSULE    Take 1 capsule (20 mg total) by mouth once daily.    EMOLLIENT COMBINATION NO.63 (MIADERM) LOTN    Apply 1 application topically 3 (three) times daily.    FUROSEMIDE (LASIX) 20 MG TABLET    take 1/2 tablet by mouth a day.    GABAPENTIN (NEURONTIN) 300 MG CAPSULE    Take 1 capsule (300 mg total) by mouth 3 (three) times daily. One capsule on the 1st day, 2 capsules on the 2nd day and then t.i.d.    HYDROCHLOROTHIAZIDE (MICROZIDE) 12.5 MG CAPSULE    Take 1 capsule (12.5 mg total) by mouth once daily.    INHALATION SPACING DEVICE (COMPACT SPACE CHAMBER)    Use as directed for inhalation.    LOSARTAN (COZAAR) 100 MG TABLET    Take 100 mg by mouth once daily.    LOSARTAN (COZAAR) 50 MG TABLET    Take 1 tablet (50 mg total) by mouth once daily.    MORPHINE (MSIR) 15 MG TABLET    Take 1 tablet (15 mg total) by mouth every 6 (six) hours as needed for Pain.    NAPROXEN (NAPROSYN) 500 MG TABLET    Take 1 tablet (500 mg total) by mouth 2 (two) times daily with meals.    NARCAN 4 MG/ACTUATION SPRY    CALL 911. ADMINISTER A SINGLE SPRAY INTRANASALLY INTO ONE NOSTRIL UPON SIGNS OF OPIOID OVERDOSE. MAY REPEAT AFTER 3 MINUTES IF NO RESPONSE.    OLANZAPINE (ZYPREXA) 10 MG TABLET    Take 10 mg by mouth nightly.    ONDANSETRON (ZOFRAN-ODT) 8 MG TBDL    Take 1 tablet (8 mg total) by mouth every 8 (eight) hours as needed (nausea).    PROCHLORPERAZINE (COMPAZINE) 10 MG TABLET    Take 1 tablet (10 mg total) by mouth 4 (four) times daily as needed (nausea).    PROPRANOLOL (INDERAL) 40 MG TABLET    Take 1 tablet (40 mg total) by mouth 2 (two) times daily.    TRAMADOL (ULTRAM) 50 MG TABLET    Take 1 tablet (50 mg total) by mouth every 6 (six) hours as needed for Pain.   Discontinued Medications    ANASTROZOLE (ARIMIDEX) 1 MG TAB    Take 1  tablet (1 mg total) by mouth once daily.        Objective:     Vitals:    08/26/19 1121   BP: (!) 154/96   Pulse: 67   Resp: 18   Temp: 97.7 °F (36.5 °C)       Physical Exam   Constitutional: She is oriented to person, place, and time. She appears well-developed and well-nourished.  Non-toxic appearance. She does not have a sickly appearance. She does not appear ill. No distress.   HENT:   Head: Normocephalic and atraumatic.   Right Ear: External ear normal.   Left Ear: External ear normal.   Nose: Nose normal.   Eyes: Conjunctivae, EOM and lids are normal. Right eye exhibits no discharge. Left eye exhibits no discharge. No scleral icterus.   Cardiovascular: Normal rate, regular rhythm, S1 normal, S2 normal and normal heart sounds. Exam reveals no gallop and no friction rub.   No murmur heard.  Pulmonary/Chest: Effort normal and breath sounds normal. No accessory muscle usage or stridor. No apnea, no tachypnea and no bradypnea. No respiratory distress. She has no decreased breath sounds. She has no wheezes. She has no rales. She exhibits no tenderness.   Abdominal: Soft. Normal appearance and bowel sounds are normal. She exhibits no distension.   Musculoskeletal: Normal range of motion.   Neurological: She is alert and oriented to person, place, and time.   Skin: Skin is warm, dry and intact. No bruising, no lesion and no rash noted. She is not diaphoretic. No pallor.   Psychiatric: Her speech is normal and behavior is normal. Judgment and thought content normal. Her mood appears anxious. She is not actively hallucinating. Cognition and memory are normal. She exhibits a depressed mood. She is attentive.   Nursing note and vitals reviewed.      Assessment:     Problem List Items Addressed This Visit        Oncology    Malignant neoplasm of upper-outer quadrant of right breast in female, estrogen receptor positive - Primary (Chronic)    Relevant Medications    letrozole (FEMARA) 2.5 mg Tab    Other Relevant Orders     CBC auto differential    Comprehensive metabolic panel      Other Visit Diagnoses     Aromatase inhibitor use        Relevant Medications    letrozole (FEMARA) 2.5 mg Tab    Other Relevant Orders    CBC auto differential    Comprehensive metabolic panel        Lab Results   Component Value Date    WBC 10.04 08/08/2019    HGB 11.1 (L) 08/08/2019    HCT 35.4 (L) 08/08/2019    MCV 77 (L) 08/08/2019     08/08/2019     BMP  Lab Results   Component Value Date     08/08/2019    K 4.4 08/08/2019     08/08/2019    CO2 23 08/08/2019    BUN 20 08/08/2019    CREATININE 1.3 08/08/2019    CALCIUM 9.7 08/08/2019    ANIONGAP 13 08/08/2019    ESTGFRAFRICA 49 (A) 08/08/2019    EGFRNONAA 43 (A) 08/08/2019     Lab Results   Component Value Date    ALT 9 (L) 08/08/2019    AST 12 08/08/2019    ALKPHOS 114 08/08/2019    BILITOT 0.4 08/08/2019       Plan:   Malignant neoplasm of upper-outer quadrant of right breast in female, estrogen receptor positive  -     CBC auto differential; Future; Expected date: 08/26/2019  -     Comprehensive metabolic panel; Future; Expected date: 08/26/2019  -     letrozole (FEMARA) 2.5 mg Tab; Take 1 tablet (2.5 mg total) by mouth once daily.  Dispense: 30 tablet; Refill: 11    Aromatase inhibitor use  -     CBC auto differential; Future; Expected date: 08/26/2019  -     Comprehensive metabolic panel; Future; Expected date: 08/26/2019  -     letrozole (FEMARA) 2.5 mg Tab; Take 1 tablet (2.5 mg total) by mouth once daily.  Dispense: 30 tablet; Refill: 11    She will stop Arimidex.  She will start letrozole 2.5 mg PO daily.  She will follow up in 2 weeks with labs - cbc and cmp and see Dr. Huynh.  Handout for Letrozole education give to the patient.  Side effects of medication discussed.  Diarrhea protocol instructions provided to the patient.       I will review assessment/plan with collaborating physician, Dr. Nithin Barton.    Thank You,  MORAIMA Candelaria

## 2019-08-26 NOTE — TELEPHONE ENCOUNTER
Contacted the patient in regards to initial letrozole (Femara) consultation/shipment. She would like to start Femara ASA. Will ship 8/26 for patient to receive and initiate 8/27 for $0 copay. Address confirmed. No changes in other medications, allergies or health conditions since we last spoke. She declined consult since she already reviewed with provider, has a handout on the medication and the side effect profile is very similar to previous medication, exemestane.    She reported feeling nauseous and weak today. She was instructed to start Femara as soon as she can receive it. She was agreeable for an [facility] to f/u 1-2 weeks after starting. Please review exemestane disposal information at this time. She will reach out with any questions/concerns in the meantime.

## 2019-08-27 ENCOUNTER — TELEPHONE (OUTPATIENT)
Dept: SURGERY | Facility: CLINIC | Age: 69
End: 2019-08-27

## 2019-08-27 NOTE — TELEPHONE ENCOUNTER
----- Message from Faizan Arreguin II, MD sent at 8/26/2019  9:47 AM CDT -----  Please schedule this patient for follow up.  She has sever breast pain.  I am trying some different things (see my note) but wanted his opinion.

## 2019-09-03 ENCOUNTER — PATIENT OUTREACH (OUTPATIENT)
Dept: ADMINISTRATIVE | Facility: HOSPITAL | Age: 69
End: 2019-09-03

## 2019-09-05 ENCOUNTER — TELEPHONE (OUTPATIENT)
Dept: RADIOLOGY | Facility: HOSPITAL | Age: 69
End: 2019-09-05

## 2019-09-05 ENCOUNTER — OFFICE VISIT (OUTPATIENT)
Dept: SURGERY | Facility: CLINIC | Age: 69
End: 2019-09-05
Payer: COMMERCIAL

## 2019-09-05 VITALS
WEIGHT: 293 LBS | SYSTOLIC BLOOD PRESSURE: 162 MMHG | BODY MASS INDEX: 45.94 KG/M2 | DIASTOLIC BLOOD PRESSURE: 106 MMHG | TEMPERATURE: 98 F | HEART RATE: 76 BPM

## 2019-09-05 DIAGNOSIS — G89.29 CHRONIC PAIN OF BREAST: ICD-10-CM

## 2019-09-05 DIAGNOSIS — N64.4 CHRONIC PAIN OF BREAST: ICD-10-CM

## 2019-09-05 DIAGNOSIS — N64.4 BREAST PAIN: Primary | ICD-10-CM

## 2019-09-05 PROCEDURE — 1101F PR PT FALLS ASSESS DOC 0-1 FALLS W/OUT INJ PAST YR: ICD-10-PCS | Mod: CPTII,S$GLB,, | Performed by: SURGERY

## 2019-09-05 PROCEDURE — 3077F PR MOST RECENT SYSTOLIC BLOOD PRESSURE >= 140 MM HG: ICD-10-PCS | Mod: CPTII,S$GLB,, | Performed by: SURGERY

## 2019-09-05 PROCEDURE — 3077F SYST BP >= 140 MM HG: CPT | Mod: CPTII,S$GLB,, | Performed by: SURGERY

## 2019-09-05 PROCEDURE — 99213 OFFICE O/P EST LOW 20 MIN: CPT | Mod: S$GLB,,, | Performed by: SURGERY

## 2019-09-05 PROCEDURE — 99999 PR PBB SHADOW E&M-EST. PATIENT-LVL IV: ICD-10-PCS | Mod: PBBFAC,,, | Performed by: SURGERY

## 2019-09-05 PROCEDURE — 99999 PR PBB SHADOW E&M-EST. PATIENT-LVL IV: CPT | Mod: PBBFAC,,, | Performed by: SURGERY

## 2019-09-05 PROCEDURE — 1101F PT FALLS ASSESS-DOCD LE1/YR: CPT | Mod: CPTII,S$GLB,, | Performed by: SURGERY

## 2019-09-05 PROCEDURE — 3080F PR MOST RECENT DIASTOLIC BLOOD PRESSURE >= 90 MM HG: ICD-10-PCS | Mod: CPTII,S$GLB,, | Performed by: SURGERY

## 2019-09-05 PROCEDURE — 3080F DIAST BP >= 90 MM HG: CPT | Mod: CPTII,S$GLB,, | Performed by: SURGERY

## 2019-09-05 PROCEDURE — 99213 PR OFFICE/OUTPT VISIT, EST, LEVL III, 20-29 MIN: ICD-10-PCS | Mod: S$GLB,,, | Performed by: SURGERY

## 2019-09-05 RX ORDER — GABAPENTIN 400 MG/1
400 CAPSULE ORAL 3 TIMES DAILY
Qty: 90 CAPSULE | Refills: 11 | Status: SHIPPED | OUTPATIENT
Start: 2019-09-05 | End: 2019-10-31

## 2019-09-05 NOTE — PROGRESS NOTES
Subjective:           Patient ID: Susu Luther is a 69 y.o. female     Right breast and axillary pain post lumpectomy and sentinel node biopsy, she is also post radiation treatment.    Chief Complaint: Follow-up    Patient has had right breast pain since her lumpectomy.  The area of pain is in the subareolar and beneath the incision.  There tense to be hypersensitivity with even light touch.  There is no atul evidence of a fluid collection.    The patient had some skin changes with radiation but these have improved.  At times the pain prevents her from lifting her right arm above her head.    She reports no atul arm swelling    Review of Systems   Skin:        Right breast and axillary pain       Objective:      Physical Exam   Constitutional: No distress.   Obese   Cardiovascular: Normal rate and regular rhythm.   Pulmonary/Chest: Effort normal and breath sounds normal.   Skin:   The right breast is slightly smaller than the left.  There is slight hyperpigmentation.  There were well-healed breast and axillary incisions.  There is no masses or atul fluid collections on exam.  There is hypersensitivity over the areas of the scar on the breast in the axilla   Vitals reviewed.      Assessment:       1. Breast pain      post lumpectomy and sentinel node biopsy  Plan:       Ultrasound of the breast to rule out occult fluid collections  Lidocaine patch  Increase Neurontin  Agree with physical therapy and pain management consultations.    Post surgical breast pain is not uncommon.  It can be challenging to treat.  This has been discussed with the patient. We will see her back in 2 months

## 2019-09-05 NOTE — PATIENT INSTRUCTIONS
Breast pain can be very troubling to deal with.  The pain that you have after surgery is not uncommon.    You can try increasing her Neurontin or gabapentin to 400 mg 3 times a day and see if that helps.  Lidocaine patches which can be placed over the area can be helpful.    We will get an ultrasound of your breast just to make sure there is no fluid collections that we can't feel and I will let you know if those results show.    Seeing pain management can be very helpful.    We will see you back in several months      Lidocaine dermal patch  What is this medicine?  LIDOCAINE (LYE cabrera andrews) causes loss of feeling in the skin and surrounding area. The medicine helps treat pain, including nerve pain.  How should I use this medicine?  This medicine is for external use only. Follow the directions on the prescription label or package.  Talk to your pediatrician regarding the use of this medicine in children. While this drug may be prescribed for children as young as 12 years for selected conditions, precautions do apply.  What side effects may I notice from receiving this medicine?  Side effects that you should report to your doctor or health care professional as soon as possible:  · allergic reactions like skin rash, itching or hives, swelling of the face, lips, or tongue  · breathing problems  · chest pain or chest tightness  · dizzines  Side effects that usually do not require medical attention (report to your doctor or health care professional if they continue or are bothersome):  · tingling, numbness at site where applied  What may interact with this medicine?  Do not take this medicine with any of the following medications:  · certain medicines for irregular heart beat  · MAOIs like Carbex, Eldepryl, Marplan, Nardil, and Parnate  This medicine may also interact with the following medications:  · other local anesthetics like pramoxine, tetracaine  Do not use any other skin products on the affected area without  asking your doctor or health care professional.  What if I miss a dose?  Apply the patches as needed for pain.  Where should I keep my medicine?  Keep out of the reach of children.  See product for storage instructions. Each product may have different instructions.  What should I tell my health care provider before I take this medicine?  They need to know if you have any of these conditions:  · heart disease  · history of irregular heart beat  · liver disease  · skin conditions or sensitivity  · skin infection  · an unusual or allergic reaction to lidocaine, parabens, other medicines, foods, dyes, or preservatives  · pregnant or trying to get pregnant  · breast-feeding  What should I watch for while using this medicine?  Tell your doctor or healthcare professional if your symptoms do not start to get better or if they get worse.  Be careful to avoid injury while the area is numb from the medicine, and you are not aware of pain.  If you are going to need surgery, a MRI, CT scan, or other procedure, tell your doctor that you are using this medicine. You may need to remove this patch before the procedure.  Do not get this medicine in your eyes. If you do, rinse out with plenty of cool tap water.  This medicine can make certain skin conditions worse. Only use it for conditions for which your doctor or health care professional has prescribed.  NOTE:This sheet is a summary. It may not cover all possible information. If you have questions about this medicine, talk to your doctor, pharmacist, or health care provider. Copyright© 2017 [employer]

## 2019-09-05 NOTE — LETTER
September 5, 2019      Guido Huynh MD  [address]  [place] [address]            [facility]  [address] [address]  Phone: [phone]  Fax: [phone]          Patient: Susu Ltuher   MR Number: [id number]   YOB: 1950   Date of Visit: 9/5/2019       Dear Dr. Guido Huynh:    Thank you for referring Susu Luther to me for evaluation. Attached you will find relevant portions of my assessment and plan of care.    If you have questions, please do not hesitate to call me. I look forward to following Susu Luther along with you.    Sincerely,    Artemio Starks MD    Enclosure  CC:  No Recipients    If you would like to receive this communication electronically, please contact [email or web address] or [phone] to request more information on [employer] access.    For providers and/or their staff who would like to refer a patient to [facility], please contact us through our one-stop-shop provider referral line, [facility], at [phone].    If you feel you have received this communication in error or would no longer like to receive these types of communications, please e-mail [email or web address]

## 2019-09-09 ENCOUNTER — TELEPHONE (OUTPATIENT)
Dept: SURGERY | Facility: CLINIC | Age: 69
End: 2019-09-09

## 2019-09-09 ENCOUNTER — LAB VISIT (OUTPATIENT)
Dept: LAB | Facility: HOSPITAL | Age: 69
End: 2019-09-09
Attending: INTERNAL MEDICINE
Payer: COMMERCIAL

## 2019-09-09 ENCOUNTER — SOCIAL WORK (OUTPATIENT)
Dept: HEMATOLOGY/ONCOLOGY | Facility: CLINIC | Age: 69
End: 2019-09-09

## 2019-09-09 ENCOUNTER — OFFICE VISIT (OUTPATIENT)
Dept: HEMATOLOGY/ONCOLOGY | Facility: CLINIC | Age: 69
End: 2019-09-09
Payer: COMMERCIAL

## 2019-09-09 VITALS
WEIGHT: 293 LBS | HEIGHT: 69 IN | DIASTOLIC BLOOD PRESSURE: 81 MMHG | HEART RATE: 55 BPM | BODY MASS INDEX: 43.4 KG/M2 | SYSTOLIC BLOOD PRESSURE: 128 MMHG | TEMPERATURE: 98 F | OXYGEN SATURATION: 94 %

## 2019-09-09 DIAGNOSIS — Z17.0 MALIGNANT NEOPLASM OF UPPER-OUTER QUADRANT OF RIGHT BREAST IN FEMALE, ESTROGEN RECEPTOR POSITIVE: Chronic | ICD-10-CM

## 2019-09-09 DIAGNOSIS — C50.411 MALIGNANT NEOPLASM OF UPPER-OUTER QUADRANT OF RIGHT BREAST IN FEMALE, ESTROGEN RECEPTOR POSITIVE: Primary | Chronic | ICD-10-CM

## 2019-09-09 DIAGNOSIS — Z17.0 MALIGNANT NEOPLASM OF UPPER-OUTER QUADRANT OF RIGHT BREAST IN FEMALE, ESTROGEN RECEPTOR POSITIVE: Primary | Chronic | ICD-10-CM

## 2019-09-09 DIAGNOSIS — F32.A DEPRESSION, UNSPECIFIED DEPRESSION TYPE: ICD-10-CM

## 2019-09-09 DIAGNOSIS — C50.411 MALIGNANT NEOPLASM OF UPPER-OUTER QUADRANT OF RIGHT BREAST IN FEMALE, ESTROGEN RECEPTOR POSITIVE: Chronic | ICD-10-CM

## 2019-09-09 DIAGNOSIS — Z79.811 AROMATASE INHIBITOR USE: ICD-10-CM

## 2019-09-09 LAB
ALBUMIN SERPL BCP-MCNC: 3.1 G/DL (ref 3.5–5.2)
ALP SERPL-CCNC: 112 U/L (ref 55–135)
ALT SERPL W/O P-5'-P-CCNC: 7 U/L (ref 10–44)
ANION GAP SERPL CALC-SCNC: 11 MMOL/L (ref 8–16)
AST SERPL-CCNC: 11 U/L (ref 10–40)
BASOPHILS # BLD AUTO: 0.04 K/UL (ref 0–0.2)
BASOPHILS NFR BLD: 0.6 % (ref 0–1.9)
BILIRUB SERPL-MCNC: 0.2 MG/DL (ref 0.1–1)
BUN SERPL-MCNC: 14 MG/DL (ref 8–23)
CALCIUM SERPL-MCNC: 9.7 MG/DL (ref 8.7–10.5)
CHLORIDE SERPL-SCNC: 110 MMOL/L (ref 95–110)
CO2 SERPL-SCNC: 26 MMOL/L (ref 23–29)
CREAT SERPL-MCNC: 1.2 MG/DL (ref 0.5–1.4)
DIFFERENTIAL METHOD: ABNORMAL
EOSINOPHIL # BLD AUTO: 0.2 K/UL (ref 0–0.5)
EOSINOPHIL NFR BLD: 3.4 % (ref 0–8)
ERYTHROCYTE [DISTWIDTH] IN BLOOD BY AUTOMATED COUNT: 16.9 % (ref 11.5–14.5)
EST. GFR  (AFRICAN AMERICAN): 54 ML/MIN/1.73 M^2
EST. GFR  (NON AFRICAN AMERICAN): 47 ML/MIN/1.73 M^2
GLUCOSE SERPL-MCNC: 104 MG/DL (ref 70–110)
HCT VFR BLD AUTO: 36.7 % (ref 37–48.5)
HGB BLD-MCNC: 11.2 G/DL (ref 12–16)
LYMPHOCYTES # BLD AUTO: 1.6 K/UL (ref 1–4.8)
LYMPHOCYTES NFR BLD: 24.3 % (ref 18–48)
MCH RBC QN AUTO: 23.8 PG (ref 27–31)
MCHC RBC AUTO-ENTMCNC: 30.5 G/DL (ref 32–36)
MCV RBC AUTO: 78 FL (ref 82–98)
MONOCYTES # BLD AUTO: 0.5 K/UL (ref 0.3–1)
MONOCYTES NFR BLD: 7.5 % (ref 4–15)
NEUTROPHILS # BLD AUTO: 4.1 K/UL (ref 1.8–7.7)
NEUTROPHILS NFR BLD: 64.2 % (ref 38–73)
PLATELET # BLD AUTO: 260 K/UL (ref 150–350)
PMV BLD AUTO: 9.6 FL (ref 9.2–12.9)
POTASSIUM SERPL-SCNC: 4.5 MMOL/L (ref 3.5–5.1)
PROT SERPL-MCNC: 7.1 G/DL (ref 6–8.4)
RBC # BLD AUTO: 4.71 M/UL (ref 4–5.4)
SODIUM SERPL-SCNC: 147 MMOL/L (ref 136–145)
WBC # BLD AUTO: 6.39 K/UL (ref 3.9–12.7)

## 2019-09-09 PROCEDURE — 3079F PR MOST RECENT DIASTOLIC BLOOD PRESSURE 80-89 MM HG: ICD-10-PCS | Mod: CPTII,S$GLB,, | Performed by: INTERNAL MEDICINE

## 2019-09-09 PROCEDURE — 85025 COMPLETE CBC W/AUTO DIFF WBC: CPT

## 2019-09-09 PROCEDURE — 99999 PR PBB SHADOW E&M-EST. PATIENT-LVL IV: ICD-10-PCS | Mod: PBBFAC,,, | Performed by: INTERNAL MEDICINE

## 2019-09-09 PROCEDURE — 3074F PR MOST RECENT SYSTOLIC BLOOD PRESSURE < 130 MM HG: ICD-10-PCS | Mod: CPTII,S$GLB,, | Performed by: INTERNAL MEDICINE

## 2019-09-09 PROCEDURE — 36415 COLL VENOUS BLD VENIPUNCTURE: CPT

## 2019-09-09 PROCEDURE — 80053 COMPREHEN METABOLIC PANEL: CPT

## 2019-09-09 PROCEDURE — 3074F SYST BP LT 130 MM HG: CPT | Mod: CPTII,S$GLB,, | Performed by: INTERNAL MEDICINE

## 2019-09-09 PROCEDURE — 1101F PT FALLS ASSESS-DOCD LE1/YR: CPT | Mod: CPTII,S$GLB,, | Performed by: INTERNAL MEDICINE

## 2019-09-09 PROCEDURE — 99215 OFFICE O/P EST HI 40 MIN: CPT | Mod: S$GLB,,, | Performed by: INTERNAL MEDICINE

## 2019-09-09 PROCEDURE — 3079F DIAST BP 80-89 MM HG: CPT | Mod: CPTII,S$GLB,, | Performed by: INTERNAL MEDICINE

## 2019-09-09 PROCEDURE — 99999 PR PBB SHADOW E&M-EST. PATIENT-LVL IV: CPT | Mod: PBBFAC,,, | Performed by: INTERNAL MEDICINE

## 2019-09-09 PROCEDURE — 99215 PR OFFICE/OUTPT VISIT, EST, LEVL V, 40-54 MIN: ICD-10-PCS | Mod: S$GLB,,, | Performed by: INTERNAL MEDICINE

## 2019-09-09 PROCEDURE — 1101F PR PT FALLS ASSESS DOC 0-1 FALLS W/OUT INJ PAST YR: ICD-10-PCS | Mod: CPTII,S$GLB,, | Performed by: INTERNAL MEDICINE

## 2019-09-09 RX ORDER — VENLAFAXINE HYDROCHLORIDE 37.5 MG/1
37.5 CAPSULE, EXTENDED RELEASE ORAL DAILY
Qty: 30 CAPSULE | Refills: 2 | Status: SHIPPED | OUTPATIENT
Start: 2019-09-09 | End: 2019-10-31 | Stop reason: SDUPTHER

## 2019-09-09 RX ORDER — TRAMADOL HYDROCHLORIDE 50 MG/1
50 TABLET ORAL EVERY 6 HOURS PRN
Qty: 50 TABLET | Refills: 0 | Status: SHIPPED | OUTPATIENT
Start: 2019-09-09 | End: 2019-10-08 | Stop reason: SDUPTHER

## 2019-09-09 NOTE — PROGRESS NOTES
Subjective:       Patient ID: Susu Luther is a 69 y.o. female.    Chief Complaint: No primary diagnosis found.  HPI: We have an opportunity to see Ms. Susu Luther in Hematology Oncology clinic at [facility] on 09/09/2019.  Ms. Susu Luther is a 69 y.o. woman with pT2N0 invasive lobular breast cancer ER positive UT negative Her2 negative s/p lumpectomy and sentinel node biopsy.       Was found right breast mass on self breast exam.  Screening mammogram on 2/26/2019 showed Impression:  Right  Mass: Right breast mass at the lower outer middle position. Assessment: 0 - Incomplete. Special Views: Spot Compression View and Ultrasound are recommended.      Left  There is no mammographic evidence of malignancy.     On March 4, 2019, underwent US guided biopsy showed FINAL PATHOLOGIC DIAGNOSIS  1. BREAST, RIGHT, LOWER OUTER 08:00, ULTRASOUND-GUIDED BIOPSY:  Invasive lobular carcinoma of breast.  Size of invasive carcinoma: 19 mm in greatest linear dimension within a single core biopsy fragment.  Horace Histologic Score: Grade 2 of 3.  Tubule formation: 3  Nuclear pleomorphism: 2  Mitoses: 1  Associated lobular carcinoma in situ (LCIS) is present.  No lymphovascular or perineural invasion.  Breast biomarkers are pending and will be included in the supplemental report.  2. AXILLA, RIGHT LYMPH NODES, ULTRASOUND-GUIDED BIOPSY:  Benign lymph node without evidence of metastatic carcinoma.  Immunohistochemical stains (Cam 5.2 and CK7) are confirmatory.     On March 27, 2019 underwent lumpectomy with sentinel node.  Pathology showed   PROCEDURE: Excision/lumpectomy  SPECIMEN LATERALITY: Right breast  TUMOR SITE: 8 o'clock  TUMOR SIZE: Approximately 4.0 x 3.0 x 3.0 cm  HISTOLOGIC TYPE: Invasive lobular carcinoma  HISTOLOGIC GRADE: Grade 2 of 3  Tubular differentiation: 3  Nuclear pleomorphism: 2  Mitotic rate: 1  TUMOR FOCALITY: Single focus of invasive carcinoma  DUCTAL CARCINOMA IN SITU: Not  identified  LOBULAR CARCINOMA IN SITU: Present  MARGINS:  Main specimen (#6) : Carcinoma present at superior, deep and medial margins  Distance from inferior margin: 5 mm  Distance from lateral margin: greater than 20 mm  Distance of anterior margin: 15 mm  Additionally submitted margins (specimen #7): Distance from newly designated margin: 4 mm  REGIONAL LYMPH NODES: Uninvolved tumor cells  Number of total lymph nodes examined: 8  Number of sentinel nodes examined: 4  TREATMENT EFFECT: No known presurgical therapy  LYMPH-VASCULAR INVASION: Not identified  ANCILLARY STUDIES (performed on patient's previous biopsy MS ):  ER: Positive (95% of tumor cells)  MD: Negative (less than 1 % of tumor cells)  Her2 by FISH: Negative (not amplified)  Ki-67%: Positive (70 % of tumor cells)  ADDITIONAL FINDINGS: Apocrine metaplasia, usual ductal hyperplasia, fibroadenomatoid change  PATHOLOGIC STAGE CLASSIFICATION: pT2 pN0     Oncotype type DX recurrence score 22, with distant recurrence risk 8%, absolute chemotherapy benefit <1%.     Completed adjuvant radiation.  Has right breast tenderness.  Started on anastrozole.  Has arthralgias. Could not tolerate.  AI was changed to letrozole, tolerating well.                   Has hot flashes and depression.  Cymbalta was started but had nausea diarrhea, could not tolerate.            Malignant neoplasm of upper-outer quadrant of right breast in female, estrogen receptor positive    3/14/2019 Initial Diagnosis     Malignant neoplasm of upper-outer quadrant of right breast in female, estrogen receptor positive         3/27/2019 Cancer Staged     Staging form: Breast, AJCC 8th Edition  - Pathologic stage from 3/27/2019: Stage IIA (pT2, pN0(sn), cM0, G2, ER+, MD-, HER2-) - Signed by Guido Huynh MD on 4/4/2019 5/20/2019 - 6/18/2019 Radiation Therapy     Treatment Site Ref. ID Energy Dose/Fx (Gy) #Fx Dose Correction (Gy) Total Dose (Gy) Start Date End Date Elapsed Days   Boost  Boost 18X 2.5 4 / 4 0 10 6/13/2019 6/18/2019 5   RtBreastAddMV Rt Breast 18X/6X 2.66 16 / 16 0 42.56 5/20/2019 6/12/2019 23               Past Medical History:   Diagnosis Date    Encounter for blood transfusion     Hypertension     Malignant neoplasm of upper-outer quadrant of right breast in female, estrogen receptor positive 3/14/2019    Stroke 2009    no residual defect     Family History   Problem Relation Age of Onset    Diabetes Maternal Grandmother     Diabetes Maternal Grandfather     Diabetes Mother     Breast cancer Neg Hx     Colon cancer Neg Hx     Ovarian cancer Neg Hx      Social History     Socioeconomic History    Marital status:      Spouse name: Not on file    Number of children: Not on file    Years of education: Not on file    Highest education level: Not on file   Occupational History    Not on file   Social Needs    Financial resource strain: Not on file    Food insecurity:     Worry: Not on file     Inability: Not on file    Transportation needs:     Medical: Not on file     Non-medical: Not on file   Tobacco Use    Smoking status: Never Smoker    Smokeless tobacco: Never Used   Substance and Sexual Activity    Alcohol use: No    Drug use: No    Sexual activity: Yes     Partners: Male     Birth control/protection: Post-menopausal   Lifestyle    Physical activity:     Days per week: Not on file     Minutes per session: Not on file    Stress: Not on file   Relationships    Social connections:     Talks on phone: Not on file     Gets together: Not on file     Attends Zoroastrianism service: Not on file     Active member of club or organization: Not on file     Attends meetings of clubs or organizations: Not on file     Relationship status: Not on file   Other Topics Concern    Not on file   Social History Narrative    Not on file     Past Surgical History:   Procedure Laterality Date    APPENDECTOMY      BIOPSY, LYMPH NODE, SENTINEL Right 3/27/2019    Performed by  [facility] at [facility] OR     SECTION      x 1    LUMPECTOMY, BREAST Right 3/27/2019    [facility] by [facility] at [facility] OR    OOPHORECTOMY      right      Current Outpatient Medications   Medication Sig Dispense Refill    albuterol (VENTOLIN HFA) 90 mcg/actuation inhaler Inhale 2 puffs into the lungs every 4 (four) hours as needed for Shortness of Breath. 18 g 11    aspirin (ECOTRIN) 81 MG EC tablet Take 1 tablet (81 mg total) by mouth once daily.  0    atorvastatin (LIPITOR) 40 MG tablet Take 1 tablet (40 mg total) by mouth once daily. 90 tablet 3    blood pressure kit med and lrg Kit Take blood pressure first thing in the morning. 1 each 0    butalbital-acetaminophen-caff -40 mg -40 mg Cap Take 1 tablet by mouth 3 (three) times daily. 30 capsule 0    cholecalciferol, vitamin D3, 50,000 unit capsule Take 50,000 Units by mouth once a week.   0    cloNIDine (CATAPRES) 0.1 MG tablet Take 1 tablet (0.1 mg total) by mouth 3 (three) times daily. 90 tablet 1    diazePAM (VALIUM) 10 MG Tab Take 1 tablet (10 mg total) by mouth 2 (two) times daily. 30 tablet 1    DULoxetine (CYMBALTA) 20 MG capsule Take 1 capsule (20 mg total) by mouth once daily. 30 capsule 2    emollient combination no.63 (MIADERM) Lotn Apply 1 application topically 3 (three) times daily.      furosemide (LASIX) 20 MG tablet take 1/2 tablet by mouth a day. 30 tablet 0    gabapentin (NEURONTIN) 400 MG capsule Take 1 capsule (400 mg total) by mouth 3 (three) times daily. 90 capsule 11    hydroCHLOROthiazide (MICROZIDE) 12.5 mg capsule Take 1 capsule (12.5 mg total) by mouth once daily. 30 capsule 8    inhalation spacing device (COMPACT SPACE CHAMBER) Use as directed for inhalation. 1 Device 0    letrozole (FEMARA) 2.5 mg Tab Take 1 tablet (2.5 mg total) by mouth once daily. 30 tablet 11    lidocaine HCl-menthol 4-1 % PtMd Apply 1 patch topically daily as needed. 30 patch 6    losartan (COZAAR) 100 MG  tablet Take 100 mg by mouth once daily.  3    losartan (COZAAR) 50 MG tablet Take 1 tablet (50 mg total) by mouth once daily. 90 tablet 3    morphine (MSIR) 15 MG tablet Take 1 tablet (15 mg total) by mouth every 6 (six) hours as needed for Pain. 40 tablet 0    naproxen (NAPROSYN) 500 MG tablet Take 1 tablet (500 mg total) by mouth 2 (two) times daily with meals. 60 tablet 0    NARCAN 4 mg/actuation [place] CALL 911. ADMINISTER A SINGLE SPRAY INTRANASALLY INTO ONE NOSTRIL UPON SIGNS OF OPIOID OVERDOSE. MAY REPEAT AFTER 3 MINUTES IF NO RESPONSE.  0    OLANZapine (ZYPREXA) 10 MG tablet Take 10 mg by mouth nightly.      ondansetron (ZOFRAN-ODT) 8 MG TbDL Take 1 tablet (8 mg total) by mouth every 8 (eight) hours as needed (nausea). 60 tablet 1    prochlorperazine (COMPAZINE) 10 MG tablet Take 1 tablet (10 mg total) by mouth 4 (four) times daily as needed (nausea). 60 tablet 1    propranolol (INDERAL) 40 MG tablet Take 1 tablet (40 mg total) by mouth 2 (two) times daily. 60 tablet 11    traMADol (ULTRAM) 50 mg tablet Take 1 tablet (50 mg total) by mouth every 6 (six) hours as needed for Pain. 50 tablet 0     No current facility-administered medications for this visit.        Labs:  Lab Results   Component Value Date    WBC 6.39 09/09/2019    HGB 11.2 (L) 09/09/2019    HCT 36.7 (L) 09/09/2019    MCV 78 (L) 09/09/2019     09/09/2019     BMP  Lab Results   Component Value Date     08/08/2019    K 4.4 08/08/2019     08/08/2019    CO2 23 08/08/2019    BUN 20 08/08/2019    CREATININE 1.3 08/08/2019    CALCIUM 9.7 08/08/2019    ANIONGAP 13 08/08/2019    ESTGFRAFRICA 49 (A) 08/08/2019    EGFRNONAA 43 (A) 08/08/2019     Lab Results   Component Value Date    ALT 9 (L) 08/08/2019    AST 12 08/08/2019    ALKPHOS 114 08/08/2019    BILITOT 0.4 08/08/2019       No results found for: IRON, TIBC, FERRITIN, SATURATEDIRO  No results found for: [id number]  No results found for: FOLATE  Lab Results   Component Value  Date    TSH 0.799 05/18/2019       I have reviewed the radiology reports and examined the scan/xray images.    Review of Systems   Constitutional: Negative.    HENT: Negative.    Eyes: Negative.    Respiratory: Negative.    Cardiovascular: Negative.    Gastrointestinal: Negative.    Endocrine: Negative.    Genitourinary: Negative.    Musculoskeletal: Negative.    Skin: Negative.    Allergic/Immunologic: Negative.    Neurological: Negative.    Hematological: Negative.    Psychiatric/Behavioral: Negative.      ECOG SCORE    0 - Fully active-able to carry on all pre-disease performance without restriction            Objective:     Vitals:    09/09/19 1025   BP: 128/81   Pulse: (!) 55   Temp: 98.4 °F (36.9 °C)   Body mass index is 47.27 kg/m².  Physical Exam   Constitutional: She is oriented to person, place, and time. She appears well-developed and well-nourished.   HENT:   Head: Normocephalic and atraumatic.   Eyes: Conjunctivae and EOM are normal.   Neck: Normal range of motion. Neck supple.   Cardiovascular: Normal rate and regular rhythm.   Pulmonary/Chest: Effort normal and breath sounds normal.   Abdominal: Soft. Bowel sounds are normal.   Musculoskeletal: Normal range of motion.   Neurological: She is alert and oriented to person, place, and time.   Skin: Skin is warm and dry.   Psychiatric: She has a normal mood and affect. Her behavior is normal. Judgment and thought content normal.   Nursing note and vitals reviewed.        Assessment:      1. Malignant neoplasm of upper-outer quadrant of right breast in female, estrogen receptor positive    2. Depression, unspecified depression type           Plan:     Malignant neoplasm of upper-outer quadrant of right breast in female, estrogen receptor positive    Continue on letrozole.   Check bone density with DEXA scan yearly.  Continue on vitamin D calcium, check vitamin D level.  -     traMADol (ULTRAM) 50 mg tablet; Take 1 tablet (50 mg total) by mouth every 6 (six)  hours as needed for Pain.  Dispense: 50 tablet; Refill: 0  -     Comprehensive metabolic panel; Future; Expected date: 10/09/2019  -     CBC auto differential; Future; Expected date: 10/09/2019  -     Vitamin D; Future; Expected date: 10/09/2019    Depression, unspecified depression type  -     venlafaxine (EFFEXOR XR) 37.5 MG 24 hr capsule; Take 1 capsule (37.5 mg total) by mouth once daily.  Dispense: 30 capsule; Refill: 2

## 2019-09-09 NOTE — TELEPHONE ENCOUNTER
----- Message from Lili Burks NP sent at 9/9/2019  2:20 PM CDT -----  Regarding: RE: Survivorship class  Thank you for keeping me in mind- I will cc a copy of this to Natasha and have her reach out to the pt and schedule an appointment for her to come in - I am out the week of the 16 th - the 23rd so it will be after that time-    Lili Kaur- please schedule this pt for a survivorship appt that is one hour long after the 25th of Sept.    Thank you!    Lili  ----- Message -----  From: Wilda Lopez LMSW  Sent: 9/9/2019   2:14 PM  To: Lili Burks NP  Subject: Survivorship class                               Pt mentioned that she was unaware of how to adjust to life after cancer. I mentioned survivorship consultation to pt as a starting tool along with community resources for general support. How do we arrange a survivorship consultation? Pt is interested after Sept 17. Also, I encouraged [facility] and she mentioned that she would give it a try.

## 2019-09-10 ENCOUNTER — HOSPITAL ENCOUNTER (OUTPATIENT)
Dept: RADIOLOGY | Facility: HOSPITAL | Age: 69
Discharge: HOME | End: 2019-09-10
Attending: SURGERY
Payer: COMMERCIAL

## 2019-09-10 ENCOUNTER — OFFICE VISIT (OUTPATIENT)
Dept: INTERNAL MEDICINE | Facility: CLINIC | Age: 69
End: 2019-09-10
Payer: COMMERCIAL

## 2019-09-10 VITALS
BODY MASS INDEX: 43.4 KG/M2 | TEMPERATURE: 97 F | WEIGHT: 293 LBS | HEIGHT: 69 IN | DIASTOLIC BLOOD PRESSURE: 102 MMHG | HEART RATE: 94 BPM | SYSTOLIC BLOOD PRESSURE: 138 MMHG | OXYGEN SATURATION: 99 %

## 2019-09-10 VITALS — BODY MASS INDEX: 43.4 KG/M2 | HEIGHT: 69 IN | WEIGHT: 293 LBS

## 2019-09-10 DIAGNOSIS — N64.4 BREAST PAIN: ICD-10-CM

## 2019-09-10 DIAGNOSIS — Z17.0 MALIGNANT NEOPLASM OF UPPER-OUTER QUADRANT OF RIGHT BREAST IN FEMALE, ESTROGEN RECEPTOR POSITIVE: Chronic | ICD-10-CM

## 2019-09-10 DIAGNOSIS — R51.9 NONINTRACTABLE HEADACHE, UNSPECIFIED CHRONICITY PATTERN, UNSPECIFIED HEADACHE TYPE: ICD-10-CM

## 2019-09-10 DIAGNOSIS — M89.9 BONE DISORDER: ICD-10-CM

## 2019-09-10 DIAGNOSIS — C50.411 MALIGNANT NEOPLASM OF UPPER-OUTER QUADRANT OF RIGHT BREAST IN FEMALE, ESTROGEN RECEPTOR POSITIVE: Chronic | ICD-10-CM

## 2019-09-10 DIAGNOSIS — I10 ESSENTIAL HYPERTENSION: Primary | ICD-10-CM

## 2019-09-10 DIAGNOSIS — Z12.11 ENCOUNTER FOR SCREENING COLONOSCOPY: ICD-10-CM

## 2019-09-10 PROCEDURE — 90662 IIV NO PRSV INCREASED AG IM: CPT | Mod: S$GLB,,, | Performed by: FAMILY MEDICINE

## 2019-09-10 PROCEDURE — 99999 PR PBB SHADOW E&M-EST. PATIENT-LVL III: CPT | Mod: PBBFAC,,, | Performed by: FAMILY MEDICINE

## 2019-09-10 PROCEDURE — 77062 BREAST TOMOSYNTHESIS BI: CPT | Mod: 26,,, | Performed by: RADIOLOGY

## 2019-09-10 PROCEDURE — 3075F SYST BP GE 130 - 139MM HG: CPT | Mod: CPTII,S$GLB,, | Performed by: FAMILY MEDICINE

## 2019-09-10 PROCEDURE — 99999 PR PBB SHADOW E&M-EST. PATIENT-LVL III: ICD-10-PCS | Mod: PBBFAC,,, | Performed by: FAMILY MEDICINE

## 2019-09-10 PROCEDURE — 76642 ULTRASOUND BREAST LIMITED: CPT | Mod: TC,RT

## 2019-09-10 PROCEDURE — 1101F PR PT FALLS ASSESS DOC 0-1 FALLS W/OUT INJ PAST YR: ICD-10-PCS | Mod: CPTII,S$GLB,, | Performed by: FAMILY MEDICINE

## 2019-09-10 PROCEDURE — 76642 ULTRASOUND BREAST LIMITED: CPT | Mod: 26,RT,, | Performed by: RADIOLOGY

## 2019-09-10 PROCEDURE — 77066 DX MAMMO INCL CAD BI: CPT | Mod: 26,,, | Performed by: RADIOLOGY

## 2019-09-10 PROCEDURE — 99214 PR OFFICE/OUTPT VISIT, EST, LEVL IV, 30-39 MIN: ICD-10-PCS | Mod: 25,S$GLB,, | Performed by: FAMILY MEDICINE

## 2019-09-10 PROCEDURE — 1101F PT FALLS ASSESS-DOCD LE1/YR: CPT | Mod: CPTII,S$GLB,, | Performed by: FAMILY MEDICINE

## 2019-09-10 PROCEDURE — 90471 FLU VACCINE - HIGH DOSE (65+) PRESERVATIVE FREE IM: ICD-10-PCS | Mod: S$GLB,,, | Performed by: FAMILY MEDICINE

## 2019-09-10 PROCEDURE — 3075F PR MOST RECENT SYSTOLIC BLOOD PRESS GE 130-139MM HG: ICD-10-PCS | Mod: CPTII,S$GLB,, | Performed by: FAMILY MEDICINE

## 2019-09-10 PROCEDURE — 99214 OFFICE O/P EST MOD 30 MIN: CPT | Mod: 25,S$GLB,, | Performed by: FAMILY MEDICINE

## 2019-09-10 PROCEDURE — 3080F PR MOST RECENT DIASTOLIC BLOOD PRESSURE >= 90 MM HG: ICD-10-PCS | Mod: CPTII,S$GLB,, | Performed by: FAMILY MEDICINE

## 2019-09-10 PROCEDURE — 90471 IMMUNIZATION ADMIN: CPT | Mod: S$GLB,,, | Performed by: FAMILY MEDICINE

## 2019-09-10 PROCEDURE — 76642 US BREAST RIGHT LIMITED: ICD-10-PCS | Mod: 26,RT,, | Performed by: RADIOLOGY

## 2019-09-10 PROCEDURE — 77066 MAMMO DIGITAL DIAGNOSTIC BILAT WITH TOMOSYNTHESIS_CAD: ICD-10-PCS | Mod: 26,,, | Performed by: RADIOLOGY

## 2019-09-10 PROCEDURE — 77062 MAMMO DIGITAL DIAGNOSTIC BILAT WITH TOMOSYNTHESIS_CAD: ICD-10-PCS | Mod: 26,,, | Performed by: RADIOLOGY

## 2019-09-10 PROCEDURE — 90662 FLU VACCINE - HIGH DOSE (65+) PRESERVATIVE FREE IM: ICD-10-PCS | Mod: S$GLB,,, | Performed by: FAMILY MEDICINE

## 2019-09-10 PROCEDURE — 77066 DX MAMMO INCL CAD BI: CPT | Mod: TC

## 2019-09-10 PROCEDURE — 3080F DIAST BP >= 90 MM HG: CPT | Mod: CPTII,S$GLB,, | Performed by: FAMILY MEDICINE

## 2019-09-10 RX ORDER — CLONIDINE HYDROCHLORIDE 0.1 MG/1
0.1 TABLET ORAL 3 TIMES DAILY
Qty: 270 TABLET | Refills: 2 | Status: SHIPPED | OUTPATIENT
Start: 2019-09-10 | End: 2020-05-21

## 2019-09-10 RX ORDER — BUTALBITAL, ACETAMINOPHEN AND CAFFEINE 50; 325; 40 MG/1; MG/1; MG/1
1 CAPSULE ORAL 3 TIMES DAILY
Qty: 30 CAPSULE | Refills: 0 | Status: SHIPPED | OUTPATIENT
Start: 2019-09-10 | End: 2019-10-31 | Stop reason: SDUPTHER

## 2019-09-10 NOTE — PROGRESS NOTES
Subjective:       Patient ID: Susu Luther is a 69 y.o. female.    Chief Complaint: Follow-up (3 mon)    F/U:      Pt is a 69 year old who is here for follow-up. Pt BP is a little higher today but not been on clonidine. Pt does have sleep apnea and should be scheduled for CPAP titration study.    Review of Systems   Constitutional: Negative.    Respiratory: Negative.    Cardiovascular: Negative.    Genitourinary: Negative.    Musculoskeletal: Negative.    Neurological: Negative.    Psychiatric/Behavioral: Negative.        Objective:      Physical Exam   Constitutional: She is oriented to person, place, and time. She appears well-developed and well-nourished.   Cardiovascular: Normal rate and regular rhythm. Exam reveals no gallop and no friction rub.   Pulmonary/Chest: Effort normal and breath sounds normal.   Abdominal: Soft. Bowel sounds are normal.   Neurological: She is alert and oriented to person, place, and time.   Skin: Skin is warm and dry.       Assessment:       1. Essential hypertension    2. Encounter for screening colonoscopy    3. Bone disorder    4. Malignant neoplasm of upper-outer quadrant of right breast in female, estrogen receptor positive    5. Nonintractable headache, unspecified chronicity pattern, unspecified headache type        Plan:       Essential hypertension  Comments:  Pt ran out of meds but BP is well controlled    Encounter for screening colonoscopy  -     Case request GI: COLONOSCOPY    Bone disorder  -     DXA Bone Density Spine And Hip; Future; Expected date: 09/10/2019    Malignant neoplasm of upper-outer quadrant of right breast in female, estrogen receptor positive  Comments:  Pt continues to see Hem/Onc    Nonintractable headache, unspecified chronicity pattern, unspecified headache type    Other orders  -     cloNIDine (CATAPRES) 0.1 MG tablet; Take 1 tablet (0.1 mg total) by mouth 3 (three) times daily.  Dispense: 270 tablet; Refill: 2  -      butalbital-acetaminophen-caff -40 mg -40 mg Cap; Take 1 tablet by mouth 3 (three) times daily.  Dispense: 30 capsule; Refill: 0

## 2019-09-11 ENCOUNTER — TELEPHONE (OUTPATIENT)
Dept: ENDOSCOPY | Facility: HOSPITAL | Age: 69
End: 2019-09-11

## 2019-09-11 NOTE — PROGRESS NOTES
SW and pt met today to f/u from, previous encounter pt had with former SW colleague, Heather. Pt and SW discussed her overall treatment journey and how it has been good and bad. SW listened and provided emotional support. Pt stated that she compared her care to others and felt that she was not truly informed. SW apologized for pt's feelings. SW  Pt stated that she did not know what to do. SW provided pt with 6 SW contact cards. SW encouraged pt to go to [facility] for additional support and introduced [facility] Support group. TIM explained that [facility] Nurse Practitioner, Lili Carrizales pt is scheduled for labs at 10:20am and to speak with Nurse Practitioner, Desirae Garcia at 11am. [place] offers survivorship classes to help pt's adjust to life after cancer diagnosis and treatment has ended. Pt stated that she may try to give it a try. Also, SW encouraged [facility] as another support resource. SW will plan to get her scheduled with survivorship class and encourage [facility] support group.    F/u with pt by the end of the week

## 2019-09-12 ENCOUNTER — TELEPHONE (OUTPATIENT)
Dept: PAIN MEDICINE | Facility: CLINIC | Age: 69
End: 2019-09-12

## 2019-09-12 NOTE — TELEPHONE ENCOUNTER
Contacted patient and patient would like to cancel appointment with Dr. Edwards on 09/13/2019. Patient states she will call and reschedule appointment at later date.

## 2019-09-18 ENCOUNTER — TELEPHONE (OUTPATIENT)
Dept: PHARMACY | Facility: CLINIC | Age: 69
End: 2019-09-18

## 2019-09-18 NOTE — TELEPHONE ENCOUNTER
Patient called [facility] and states that she has #5 remaining tablets of her letrozole on hand (she has taken her dose for today). She reports no missed doses and takes her letrozole with some food. She was not able to say when she started the letrozole, but based on this information, it would have been around 8/26. Medication list reviewed. No new allergies or health conditions. Patient reports that the only side effect she has is some minor hot flashes. We went over drinking plenty of water, staying near AC and wearing light, loose fitting clothing to help. Patient confirmed understanding. Does not have any questions or concerns at this time and is aware to reach out to [facility] if needed.     Letrozole will be shipped on 9/19 for patient to receive on 9/20. Address confirmed. Co-pay $0.00

## 2019-09-30 ENCOUNTER — TELEPHONE (OUTPATIENT)
Dept: SURGERY | Facility: CLINIC | Age: 69
End: 2019-09-30

## 2019-09-30 NOTE — TELEPHONE ENCOUNTER
Spoke to patient she is aware of her appointment date and time with Dr. Paz for breast pain/, patient voiced understanding.

## 2019-09-30 NOTE — TELEPHONE ENCOUNTER
----- Message from Catarina Payan sent at 9/30/2019 11:59 AM CDT -----  Contact: pt  The pt wants to know if she needs to schedule a f/u appt, the pt gave no additional info and can be reached at [phone]///[email or web address]

## 2019-10-01 ENCOUNTER — HOSPITAL ENCOUNTER (OUTPATIENT)
Dept: SLEEP MEDICINE | Facility: HOSPITAL | Age: 69
Discharge: HOME | End: 2019-10-01
Attending: NURSE PRACTITIONER
Payer: COMMERCIAL

## 2019-10-01 DIAGNOSIS — G47.33 OSA (OBSTRUCTIVE SLEEP APNEA): ICD-10-CM

## 2019-10-01 PROCEDURE — 95811 PR POLYSOMNOGRAPHY W/CPAP: ICD-10-PCS | Mod: 26,,, | Performed by: INTERNAL MEDICINE

## 2019-10-01 PROCEDURE — 95811 POLYSOM 6/>YRS CPAP 4/> PARM: CPT

## 2019-10-01 PROCEDURE — 95811 POLYSOM 6/>YRS CPAP 4/> PARM: CPT | Mod: 26,,, | Performed by: INTERNAL MEDICINE

## 2019-10-01 NOTE — Clinical Note
EKG showed no cardiac abnormalities.No snoring was audible during this study.Mild Oxygen DesaturationModerate Obstructive Sleep apnea(NILS) Optimal pressure attained. CPAP 20 cm water pressure wasoptimalNo Significant Central Sleep Apnea (CSA)No significant periodic leg movements(PLMs) during sleep.Reduced sleep efficiency, normal primary sleep latency, normal REM sleep latency and no slow wavelatency.Supplemental oxygen was not administered during the study.Shannan Luther - 1950Page 2 of 3Electronically Authenticated By Joel Munguia MD on 10/04/2019 06:08 PM, from [phone] Munguia MDNPI: [id number] Sleep Apnea (G47.33)Trial of CPAP therapy on 20 cm H2O with a Medium size Resmed Full Face Mask AirFit F20 mask andheated humidification. trial of auto PAP 10-20 cm water pressure could be used as alternative. ConsiderBiPAP if patient intolerant to high pressuresAvoid alcohol, sedatives and other CNS dep

## 2019-10-03 ENCOUNTER — OFFICE VISIT (OUTPATIENT)
Dept: SURGERY | Facility: CLINIC | Age: 69
End: 2019-10-03
Payer: COMMERCIAL

## 2019-10-03 VITALS
SYSTOLIC BLOOD PRESSURE: 127 MMHG | WEIGHT: 293 LBS | HEART RATE: 58 BPM | DIASTOLIC BLOOD PRESSURE: 83 MMHG | HEIGHT: 69 IN | TEMPERATURE: 98 F | BODY MASS INDEX: 43.4 KG/M2

## 2019-10-03 DIAGNOSIS — G89.29 CHRONIC PAIN OF BREAST: Primary | ICD-10-CM

## 2019-10-03 DIAGNOSIS — N64.4 CHRONIC PAIN OF BREAST: Primary | ICD-10-CM

## 2019-10-03 PROCEDURE — 1101F PR PT FALLS ASSESS DOC 0-1 FALLS W/OUT INJ PAST YR: ICD-10-PCS | Mod: CPTII,S$GLB,, | Performed by: SURGERY

## 2019-10-03 PROCEDURE — 3079F PR MOST RECENT DIASTOLIC BLOOD PRESSURE 80-89 MM HG: ICD-10-PCS | Mod: CPTII,S$GLB,, | Performed by: SURGERY

## 2019-10-03 PROCEDURE — 3074F SYST BP LT 130 MM HG: CPT | Mod: CPTII,S$GLB,, | Performed by: SURGERY

## 2019-10-03 PROCEDURE — 99999 PR PBB SHADOW E&M-EST. PATIENT-LVL V: CPT | Mod: PBBFAC,,, | Performed by: SURGERY

## 2019-10-03 PROCEDURE — 99213 OFFICE O/P EST LOW 20 MIN: CPT | Mod: S$GLB,,, | Performed by: SURGERY

## 2019-10-03 PROCEDURE — 1101F PT FALLS ASSESS-DOCD LE1/YR: CPT | Mod: CPTII,S$GLB,, | Performed by: SURGERY

## 2019-10-03 PROCEDURE — 99213 PR OFFICE/OUTPT VISIT, EST, LEVL III, 20-29 MIN: ICD-10-PCS | Mod: S$GLB,,, | Performed by: SURGERY

## 2019-10-03 PROCEDURE — 99999 PR PBB SHADOW E&M-EST. PATIENT-LVL V: ICD-10-PCS | Mod: PBBFAC,,, | Performed by: SURGERY

## 2019-10-03 PROCEDURE — 3074F PR MOST RECENT SYSTOLIC BLOOD PRESSURE < 130 MM HG: ICD-10-PCS | Mod: CPTII,S$GLB,, | Performed by: SURGERY

## 2019-10-03 PROCEDURE — 3079F DIAST BP 80-89 MM HG: CPT | Mod: CPTII,S$GLB,, | Performed by: SURGERY

## 2019-10-03 RX ORDER — FUROSEMIDE 20 MG/1
TABLET ORAL
Qty: 30 TABLET | Refills: 2 | Status: SHIPPED | OUTPATIENT
Start: 2019-10-03 | End: 2019-11-11 | Stop reason: SDUPTHER

## 2019-10-03 RX ORDER — DICLOFENAC SODIUM 75 MG/1
75 TABLET, DELAYED RELEASE ORAL 2 TIMES DAILY
Qty: 60 TABLET | Refills: 0 | Status: SHIPPED | OUTPATIENT
Start: 2019-10-03 | End: 2019-11-02

## 2019-10-03 RX ORDER — LIDOCAINE 50 MG/G
1 PATCH TOPICAL DAILY PRN
Qty: 30 PATCH | Refills: 0 | Status: SHIPPED | OUTPATIENT
Start: 2019-10-03 | End: 2019-12-16 | Stop reason: SDUPTHER

## 2019-10-03 RX ORDER — GABAPENTIN 100 MG/1
100 CAPSULE ORAL 3 TIMES DAILY
Qty: 90 CAPSULE | Refills: 0 | Status: SHIPPED | OUTPATIENT
Start: 2019-10-03 | End: 2019-10-24

## 2019-10-03 NOTE — PROGRESS NOTES
General Surgery                History & Physical      Subjective:         Patient ID: Susu Luther is a 69 y.o. female.    Chief Complaint: Follow-up    70 yo F patient of Dr. Pizano with post lumpectomy/XRT significant neuralgia of right breast.  She continues to complain of breast swelling, incisional hypersensitivity, and difficulty adducting her arm > 90 degrees. She states that  mg gabapentin has not improved her symptoms. She was prescribed ultram by Dr. Huynh with minimal improvement in pain and resulted in overall fatigue. She did not full the rx for lidcaine patches prescribed by Dr. Pizano at her last clinic visit. Follow up imaging studies confirmed no recurrent disease.     Follow-up   Pertinent negatives include no abdominal pain, chest pain, fatigue or headaches.     Review of Systems   Constitutional: Negative for activity change, fatigue and unexpected weight change.   Respiratory: Negative for chest tightness.    Cardiovascular: Negative for chest pain.   Gastrointestinal: Negative for abdominal pain.   Musculoskeletal: Negative for back pain.        Left arm weakness/pain   Skin: Negative for color change.        hypersensitivity   Allergic/Immunologic: Negative for immunocompromised state.   Neurological: Negative for headaches.   Hematological: Negative for adenopathy. Does not bruise/bleed easily.   Psychiatric/Behavioral: The patient is not nervous/anxious.          Objective:      Physical Exam   Constitutional: She appears well-developed and well-nourished. No distress.   HENT:   Head: Normocephalic and atraumatic.   Eyes: EOM are normal.   Neck: Neck supple.   Cardiovascular: Normal rate and regular rhythm.   Pulmonary/Chest: Effort normal. Right breast exhibits no inverted nipple, no mass, no nipple discharge, no skin change and no tenderness. Left breast exhibits no inverted nipple, no mass, no nipple discharge, no skin change and no tenderness. Breasts are  symmetrical.       Musculoskeletal: Normal range of motion.   Lymphadenopathy:     She has no axillary adenopathy.   Skin: Skin is warm.   Psychiatric: She has a normal mood and affect.        US and MMG personally reviewed and reviewed with patient and her . Small fluid collection would not account for symptomatology.     Assessment/Plan:   Chronic pain of breast  -     Ambulatory consult to Physical Medicine Rehab  -     Ambulatory consult to Pain Clinic    Other orders  -     diclofenac (VOLTAREN) 75 MG EC tablet; Take 1 tablet (75 mg total) by mouth 2 (two) times daily.  Dispense: 60 tablet; Refill: 0  -     gabapentin (NEURONTIN) 100 MG capsule; Take 1 capsule (100 mg total) by mouth 3 (three) times daily.  Dispense: 90 capsule; Refill: 0  -     lidocaine (LIDODERM) 5 %; Place 1 patch onto the skin daily as needed. Remove & Discard patch within 12 hours or as directed by MD  Dispense: 15 patch; Refill: 0      I discussed the difficult management of her condition. I explained that dual therapy with gabapentin + willams 2 inhibitor has had the best results. I sent both rx and encouraged use of lidocaine path prior to bedtime.  I instructed her to stop taking the 800 mg ibuprofen once she starts the diclofenac. She has no renal dysfunction or h/o PUD.     Referrals for pain management and PM&R placed. She did not attender her previously made PT appointment.     Keep scheduled f/u with Dr. Pizano in 4 weeks.     Halle Paz

## 2019-10-04 NOTE — PROCEDURES
"EKG showed no cardiac abnormalities.  No snoring was audible during this study.  Mild Oxygen Desaturation  Moderate Obstructive Sleep apnea(NILS) Optimal pressure attained. CPAP 20 cm water pressure was  optimal  No Significant Central Sleep Apnea (CSA)  No significant periodic leg movements(PLMs) during sleep.  Reduced sleep efficiency, normal primary sleep latency, normal REM sleep latency and no slow wave  latency.  Supplemental oxygen was not administered during the study.  Susu Luther - 1950  Page 2 of 3  Electronically Authenticated By Joel Munguia MD on 10/04/2019 06:08 PM, from [phone]  Joel Munguia MD  NPI: [id number]  DIAGNOSIS  RECOMMENDATIONS  Obstructive Sleep Apnea (G47.33)  Trial of CPAP therapy on 20 cm H2O with a Medium size Resmed Full Face Mask AirFit F20 mask and  heated humidification. trial of auto PAP 10-20 cm water pressure could be used as alternative. Consider  BiPAP if patient intolerant to high pressures  Avoid alcohol, sedatives and other CNS depressants that may worsen sleep apnea and disrupt normal sleep  architecture.  Sleep hygiene should be reviewed to assess factors that may improve sleep quality.  Weight management and regular exercise should be initiated or continued under supervison of clinician.  Return to Sleep Center for re-evaluation after 4 -6 weeks of therapy    See imported Sleep Study result in "Chart Review" under the   "Media tab".      (This Sleep Study was interpreted by a Board Certified Sleep   Specialist who conducted an epoch-by-epoch review of the entire   raw data recording.)     (The indication for this sleep study was reviewed and deemed   appropriate by [employer] Practice Parameters or other reasons by a   Board Certified Sleep Specialist.)    Joel Munguia MD      " No

## 2019-10-08 ENCOUNTER — OFFICE VISIT (OUTPATIENT)
Dept: INTERNAL MEDICINE | Facility: CLINIC | Age: 69
End: 2019-10-08
Payer: COMMERCIAL

## 2019-10-08 ENCOUNTER — TELEPHONE (OUTPATIENT)
Dept: INTERNAL MEDICINE | Facility: CLINIC | Age: 69
End: 2019-10-08

## 2019-10-08 ENCOUNTER — TELEPHONE (OUTPATIENT)
Dept: HEMATOLOGY/ONCOLOGY | Facility: CLINIC | Age: 69
End: 2019-10-08

## 2019-10-08 VITALS
WEIGHT: 293 LBS | HEART RATE: 75 BPM | TEMPERATURE: 96 F | DIASTOLIC BLOOD PRESSURE: 89 MMHG | HEIGHT: 69 IN | SYSTOLIC BLOOD PRESSURE: 139 MMHG | OXYGEN SATURATION: 98 % | BODY MASS INDEX: 43.4 KG/M2

## 2019-10-08 DIAGNOSIS — G47.33 OSA (OBSTRUCTIVE SLEEP APNEA): Primary | Chronic | ICD-10-CM

## 2019-10-08 DIAGNOSIS — Z17.0 MALIGNANT NEOPLASM OF UPPER-OUTER QUADRANT OF RIGHT BREAST IN FEMALE, ESTROGEN RECEPTOR POSITIVE: Chronic | ICD-10-CM

## 2019-10-08 DIAGNOSIS — C50.411 MALIGNANT NEOPLASM OF UPPER-OUTER QUADRANT OF RIGHT BREAST IN FEMALE, ESTROGEN RECEPTOR POSITIVE: Chronic | ICD-10-CM

## 2019-10-08 DIAGNOSIS — I10 ESSENTIAL HYPERTENSION: ICD-10-CM

## 2019-10-08 PROCEDURE — 3075F SYST BP GE 130 - 139MM HG: CPT | Mod: CPTII,S$GLB,, | Performed by: FAMILY MEDICINE

## 2019-10-08 PROCEDURE — 99999 PR PBB SHADOW E&M-EST. PATIENT-LVL III: ICD-10-PCS | Mod: PBBFAC,,, | Performed by: FAMILY MEDICINE

## 2019-10-08 PROCEDURE — 1101F PT FALLS ASSESS-DOCD LE1/YR: CPT | Mod: CPTII,S$GLB,, | Performed by: FAMILY MEDICINE

## 2019-10-08 PROCEDURE — 1101F PR PT FALLS ASSESS DOC 0-1 FALLS W/OUT INJ PAST YR: ICD-10-PCS | Mod: CPTII,S$GLB,, | Performed by: FAMILY MEDICINE

## 2019-10-08 PROCEDURE — 99999 PR PBB SHADOW E&M-EST. PATIENT-LVL III: CPT | Mod: PBBFAC,,, | Performed by: FAMILY MEDICINE

## 2019-10-08 PROCEDURE — 99214 PR OFFICE/OUTPT VISIT, EST, LEVL IV, 30-39 MIN: ICD-10-PCS | Mod: S$GLB,,, | Performed by: FAMILY MEDICINE

## 2019-10-08 PROCEDURE — 3079F PR MOST RECENT DIASTOLIC BLOOD PRESSURE 80-89 MM HG: ICD-10-PCS | Mod: CPTII,S$GLB,, | Performed by: FAMILY MEDICINE

## 2019-10-08 PROCEDURE — 99214 OFFICE O/P EST MOD 30 MIN: CPT | Mod: S$GLB,,, | Performed by: FAMILY MEDICINE

## 2019-10-08 PROCEDURE — 3079F DIAST BP 80-89 MM HG: CPT | Mod: CPTII,S$GLB,, | Performed by: FAMILY MEDICINE

## 2019-10-08 PROCEDURE — 3075F PR MOST RECENT SYSTOLIC BLOOD PRESS GE 130-139MM HG: ICD-10-PCS | Mod: CPTII,S$GLB,, | Performed by: FAMILY MEDICINE

## 2019-10-08 RX ORDER — TRAMADOL HYDROCHLORIDE 50 MG/1
50 TABLET ORAL EVERY 6 HOURS PRN
Qty: 50 TABLET | Refills: 0 | Status: SHIPPED | OUTPATIENT
Start: 2019-10-08 | End: 2019-10-31 | Stop reason: SDUPTHER

## 2019-10-08 RX ORDER — DIAZEPAM 10 MG/1
10 TABLET ORAL 2 TIMES DAILY
Qty: 30 TABLET | Refills: 1 | Status: SHIPPED | OUTPATIENT
Start: 2019-10-08 | End: 2019-12-16 | Stop reason: SDUPTHER

## 2019-10-08 NOTE — PROGRESS NOTES
Subjective:       Patient ID: Susu Luther is a 69 y.o. female.    Chief Complaint: Follow-up (4 wk)    Pt is a 69 year old who is here for follow-up. Pt had CPAP study with a titration study. Unclear why pt is still not on CPAP machine. She was given mask and tubing with follow-up in 3 weeks. Pt is also    Review of Systems   Constitutional: Negative.    Respiratory: Negative.    Cardiovascular: Negative.    Gastrointestinal: Negative.    Musculoskeletal: Negative.    Neurological: Negative.    Hematological: Negative.        Objective:      Physical Exam   Constitutional: She appears well-developed and well-nourished.   Cardiovascular: Normal rate and regular rhythm. Exam reveals no friction rub.   No murmur heard.  Pulmonary/Chest: Effort normal and breath sounds normal.       Assessment:       1. Malignant neoplasm of upper-outer quadrant of right breast in female, estrogen receptor positive        Plan:       NILS (obstructive sleep apnea)  Comments:  Not sure what else to do. Pt was given mask with setttings and return in 4-6 weeks to evaluate trial    Malignant neoplasm of upper-outer quadrant of right breast in female, estrogen receptor positive  Comments:  Will consult with Dr. Huynh to see if pt can go on Megace due to no appetite. Even though she has lost weight not appropriate by just not eating  Orders:  -     traMADol (ULTRAM) 50 mg tablet; Take 1 tablet (50 mg total) by mouth every 6 (six) hours as needed for Pain.  Dispense: 50 tablet; Refill: 0    Essential hypertension  Comments:  BP is well controlled    Other orders  -     diazePAM (VALIUM) 10 MG Tab; Take 1 tablet (10 mg total) by mouth 2 (two) times daily.  Dispense: 30 tablet; Refill: 1

## 2019-10-08 NOTE — TELEPHONE ENCOUNTER
SW contacted pt to f/u from previous encounter. Pt stated that she and her spouse were in good spirits. Pt stated that she is looking forward to her appt with [facility].     F/u as needs arise.

## 2019-10-09 ENCOUNTER — LAB VISIT (OUTPATIENT)
Dept: LAB | Facility: HOSPITAL | Age: 69
End: 2019-10-09
Attending: INTERNAL MEDICINE
Payer: COMMERCIAL

## 2019-10-09 ENCOUNTER — OFFICE VISIT (OUTPATIENT)
Dept: HEMATOLOGY/ONCOLOGY | Facility: CLINIC | Age: 69
End: 2019-10-09
Payer: COMMERCIAL

## 2019-10-09 VITALS
OXYGEN SATURATION: 96 % | BODY MASS INDEX: 41.95 KG/M2 | WEIGHT: 293 LBS | HEIGHT: 70 IN | HEART RATE: 65 BPM | SYSTOLIC BLOOD PRESSURE: 123 MMHG | DIASTOLIC BLOOD PRESSURE: 86 MMHG | TEMPERATURE: 98 F

## 2019-10-09 DIAGNOSIS — N64.4 CHRONIC PAIN OF BREAST: ICD-10-CM

## 2019-10-09 DIAGNOSIS — Z17.0 MALIGNANT NEOPLASM OF UPPER-OUTER QUADRANT OF RIGHT BREAST IN FEMALE, ESTROGEN RECEPTOR POSITIVE: Primary | Chronic | ICD-10-CM

## 2019-10-09 DIAGNOSIS — C50.411 MALIGNANT NEOPLASM OF UPPER-OUTER QUADRANT OF RIGHT BREAST IN FEMALE, ESTROGEN RECEPTOR POSITIVE: Primary | Chronic | ICD-10-CM

## 2019-10-09 DIAGNOSIS — T50.905A HOT FLASH DUE TO MEDICATION: ICD-10-CM

## 2019-10-09 DIAGNOSIS — G89.29 CHRONIC PAIN OF BREAST: ICD-10-CM

## 2019-10-09 DIAGNOSIS — Z17.0 MALIGNANT NEOPLASM OF UPPER-OUTER QUADRANT OF RIGHT BREAST IN FEMALE, ESTROGEN RECEPTOR POSITIVE: Chronic | ICD-10-CM

## 2019-10-09 DIAGNOSIS — R23.2 HOT FLASH DUE TO MEDICATION: ICD-10-CM

## 2019-10-09 DIAGNOSIS — C50.411 MALIGNANT NEOPLASM OF UPPER-OUTER QUADRANT OF RIGHT BREAST IN FEMALE, ESTROGEN RECEPTOR POSITIVE: Chronic | ICD-10-CM

## 2019-10-09 LAB
25(OH)D3+25(OH)D2 SERPL-MCNC: 40 NG/ML (ref 30–96)
ALBUMIN SERPL BCP-MCNC: 3.1 G/DL (ref 3.5–5.2)
ALP SERPL-CCNC: 115 U/L (ref 55–135)
ALT SERPL W/O P-5'-P-CCNC: 12 U/L (ref 10–44)
ANION GAP SERPL CALC-SCNC: 9 MMOL/L (ref 8–16)
AST SERPL-CCNC: 11 U/L (ref 10–40)
BASOPHILS # BLD AUTO: 0.04 K/UL (ref 0–0.2)
BASOPHILS NFR BLD: 0.5 % (ref 0–1.9)
BILIRUB SERPL-MCNC: 0.3 MG/DL (ref 0.1–1)
BUN SERPL-MCNC: 23 MG/DL (ref 8–23)
CALCIUM SERPL-MCNC: 9.9 MG/DL (ref 8.7–10.5)
CHLORIDE SERPL-SCNC: 105 MMOL/L (ref 95–110)
CO2 SERPL-SCNC: 28 MMOL/L (ref 23–29)
CREAT SERPL-MCNC: 1.5 MG/DL (ref 0.5–1.4)
DIFFERENTIAL METHOD: ABNORMAL
EOSINOPHIL # BLD AUTO: 0.1 K/UL (ref 0–0.5)
EOSINOPHIL NFR BLD: 1.9 % (ref 0–8)
ERYTHROCYTE [DISTWIDTH] IN BLOOD BY AUTOMATED COUNT: 17.6 % (ref 11.5–14.5)
EST. GFR  (AFRICAN AMERICAN): 41 ML/MIN/1.73 M^2
EST. GFR  (NON AFRICAN AMERICAN): 36 ML/MIN/1.73 M^2
GLUCOSE SERPL-MCNC: 84 MG/DL (ref 70–110)
HCT VFR BLD AUTO: 37.1 % (ref 37–48.5)
HGB BLD-MCNC: 11.4 G/DL (ref 12–16)
IMM GRANULOCYTES # BLD AUTO: 0.02 K/UL (ref 0–0.04)
IMM GRANULOCYTES NFR BLD AUTO: 0.3 % (ref 0–0.5)
LYMPHOCYTES # BLD AUTO: 1.7 K/UL (ref 1–4.8)
LYMPHOCYTES NFR BLD: 22.9 % (ref 18–48)
MCH RBC QN AUTO: 23.8 PG (ref 27–31)
MCHC RBC AUTO-ENTMCNC: 30.7 G/DL (ref 32–36)
MCV RBC AUTO: 77 FL (ref 82–98)
MONOCYTES # BLD AUTO: 0.8 K/UL (ref 0.3–1)
MONOCYTES NFR BLD: 10.5 % (ref 4–15)
NEUTROPHILS # BLD AUTO: 4.7 K/UL (ref 1.8–7.7)
NEUTROPHILS NFR BLD: 64.2 % (ref 38–73)
NRBC BLD-RTO: 0 /100 WBC
PLATELET # BLD AUTO: 258 K/UL (ref 150–350)
PMV BLD AUTO: 9.6 FL (ref 9.2–12.9)
POTASSIUM SERPL-SCNC: 3.9 MMOL/L (ref 3.5–5.1)
PROT SERPL-MCNC: 7.2 G/DL (ref 6–8.4)
RBC # BLD AUTO: 4.8 M/UL (ref 4–5.4)
SODIUM SERPL-SCNC: 142 MMOL/L (ref 136–145)
WBC # BLD AUTO: 7.35 K/UL (ref 3.9–12.7)

## 2019-10-09 PROCEDURE — 1101F PR PT FALLS ASSESS DOC 0-1 FALLS W/OUT INJ PAST YR: ICD-10-PCS | Mod: CPTII,S$GLB,, | Performed by: INTERNAL MEDICINE

## 2019-10-09 PROCEDURE — 99214 OFFICE O/P EST MOD 30 MIN: CPT | Mod: S$GLB,,, | Performed by: INTERNAL MEDICINE

## 2019-10-09 PROCEDURE — 3079F PR MOST RECENT DIASTOLIC BLOOD PRESSURE 80-89 MM HG: ICD-10-PCS | Mod: CPTII,S$GLB,, | Performed by: INTERNAL MEDICINE

## 2019-10-09 PROCEDURE — 85025 COMPLETE CBC W/AUTO DIFF WBC: CPT

## 2019-10-09 PROCEDURE — 3079F DIAST BP 80-89 MM HG: CPT | Mod: CPTII,S$GLB,, | Performed by: INTERNAL MEDICINE

## 2019-10-09 PROCEDURE — 99999 PR PBB SHADOW E&M-EST. PATIENT-LVL III: CPT | Mod: PBBFAC,,, | Performed by: INTERNAL MEDICINE

## 2019-10-09 PROCEDURE — 1101F PT FALLS ASSESS-DOCD LE1/YR: CPT | Mod: CPTII,S$GLB,, | Performed by: INTERNAL MEDICINE

## 2019-10-09 PROCEDURE — 3074F SYST BP LT 130 MM HG: CPT | Mod: CPTII,S$GLB,, | Performed by: INTERNAL MEDICINE

## 2019-10-09 PROCEDURE — 3074F PR MOST RECENT SYSTOLIC BLOOD PRESSURE < 130 MM HG: ICD-10-PCS | Mod: CPTII,S$GLB,, | Performed by: INTERNAL MEDICINE

## 2019-10-09 PROCEDURE — 82306 VITAMIN D 25 HYDROXY: CPT

## 2019-10-09 PROCEDURE — 99999 PR PBB SHADOW E&M-EST. PATIENT-LVL III: ICD-10-PCS | Mod: PBBFAC,,, | Performed by: INTERNAL MEDICINE

## 2019-10-09 PROCEDURE — 80053 COMPREHEN METABOLIC PANEL: CPT

## 2019-10-09 PROCEDURE — 36415 COLL VENOUS BLD VENIPUNCTURE: CPT

## 2019-10-09 PROCEDURE — 99214 PR OFFICE/OUTPT VISIT, EST, LEVL IV, 30-39 MIN: ICD-10-PCS | Mod: S$GLB,,, | Performed by: INTERNAL MEDICINE

## 2019-10-09 NOTE — PROGRESS NOTES
Subjective:      Patient ID: Susu Luther is a 69 y.o. female.    Chief Complaint: No chief complaint on file.    The patient is a 69-year-old  female with history of stage II right breast carcinoma (ER positive) who presents to the [facility] today for follow-up.  The patient is followed in the outpatient Hematology Oncology Clinic by Dr. Guido Huynh and I am evaluating the patient today for the first time.  I have reviewed all the patient's relevant clinical history available in the medical record and have utilized this in my evaluation and management recommendations today.  Today the patient reports that she has previously been unable to tolerate Arimidex for adjuvant endocrine therapy.  She was since switched to Femara which she initially tolerated well but over the past week to 10 days she has had increasing problems with night sweats and severe hot flashes.  She also reports chronic breast pain and I have reviewed surgery nodes. She was recently started on diclofenac and gabapentin.  She reports chronic anxiety.  She denies any nausea, vomiting or abdominal pain.  She denies any melena, hematochezia, hematemesis, hemoptysis or hematuria.  She denies any bowel or urinary complaints.    Review of Systems   Constitutional: Positive for diaphoresis and fatigue. Negative for activity change, appetite change, chills, fever and unexpected weight change.   HENT: Negative for congestion, dental problem, ear discharge, ear pain, hearing loss, mouth sores, postnasal drip, sinus pressure, sore throat, tinnitus, trouble swallowing and voice change.    Eyes: Negative for photophobia, pain and visual disturbance.   Respiratory: Negative for cough, chest tightness, shortness of breath and wheezing.    Cardiovascular: Negative for chest pain, palpitations and leg swelling.   Gastrointestinal: Negative for abdominal distention, abdominal pain, blood in stool, constipation, diarrhea, nausea  and vomiting.   Endocrine: Positive for cold intolerance and heat intolerance. Negative for polydipsia, polyphagia and polyuria.   Genitourinary: Negative for difficulty urinating, dysuria, flank pain, frequency, hematuria, urgency, vaginal bleeding and vaginal discharge.   Musculoskeletal: Negative for arthralgias, back pain, gait problem, joint swelling and myalgias.   Skin: Negative for pallor and rash.   Allergic/Immunologic: Negative for immunocompromised state.   Neurological: Negative for dizziness, syncope, weakness, light-headedness, numbness and headaches.   Hematological: Negative for adenopathy. Does not bruise/bleed easily.   Psychiatric/Behavioral: Positive for dysphoric mood. Negative for agitation and confusion. The patient is nervous/anxious.        Medication List with Changes/Refills   Current Medications    ALBUTEROL (VENTOLIN HFA) 90 MCG/ACTUATION INHALER    Inhale 2 puffs into the lungs every 4 (four) hours as needed for Shortness of Breath.    ASPIRIN (ECOTRIN) 81 MG EC TABLET    Take 1 tablet (81 mg total) by mouth once daily.    ATORVASTATIN (LIPITOR) 40 MG TABLET    Take 1 tablet (40 mg total) by mouth once daily.    BLOOD PRESSURE KIT MED AND LRG KIT    Take blood pressure first thing in the morning.    BUTALBITAL-ACETAMINOPHEN-CAFF -40 MG -40 MG CAP    Take 1 tablet by mouth 3 (three) times daily.    CHOLECALCIFEROL, VITAMIN D3, 50,000 UNIT CAPSULE    Take 50,000 Units by mouth once a week.     CLONIDINE (CATAPRES) 0.1 MG TABLET    Take 1 tablet (0.1 mg total) by mouth 3 (three) times daily.    DIAZEPAM (VALIUM) 10 MG TAB    Take 1 tablet (10 mg total) by mouth 2 (two) times daily.    DICLOFENAC (VOLTAREN) 75 MG EC TABLET    Take 1 tablet (75 mg total) by mouth 2 (two) times daily.    EMOLLIENT COMBINATION NO.63 (MIADERM) LOTN    Apply 1 application topically 3 (three) times daily.    FUROSEMIDE (LASIX) 20 MG TABLET    take 1/2 tablet by mouth a day.    GABAPENTIN (NEURONTIN)  100 MG CAPSULE    Take 1 capsule (100 mg total) by mouth 3 (three) times daily.    GABAPENTIN (NEURONTIN) 400 MG CAPSULE    Take 1 capsule (400 mg total) by mouth 3 (three) times daily.    HYDROCHLOROTHIAZIDE (MICROZIDE) 12.5 MG CAPSULE    Take 1 capsule (12.5 mg total) by mouth once daily.    INHALATION SPACING DEVICE (COMPACT SPACE CHAMBER)    Use as directed for inhalation.    LIDOCAINE (LIDODERM) 5 %    Place 1 patch onto the skin daily as needed. Remove & Discard patch within 12 hours or as directed by MD    LIDOCAINE HCL-MENTHOL 4-1 % PTMD    Apply 1 patch topically daily as needed.    LOSARTAN (COZAAR) 100 MG TABLET    Take 100 mg by mouth once daily.    LOSARTAN (COZAAR) 50 MG TABLET    Take 1 tablet (50 mg total) by mouth once daily.    MORPHINE (MSIR) 15 MG TABLET    Take 1 tablet (15 mg total) by mouth every 6 (six) hours as needed for Pain.    NAPROXEN (NAPROSYN) 500 MG TABLET    Take 1 tablet (500 mg total) by mouth 2 (two) times daily with meals.    NARCAN 4 MG/ACTUATION SPRY    CALL 911. ADMINISTER A SINGLE SPRAY INTRANASALLY INTO ONE NOSTRIL UPON SIGNS OF OPIOID OVERDOSE. MAY REPEAT AFTER 3 MINUTES IF NO RESPONSE.    OLANZAPINE (ZYPREXA) 10 MG TABLET    Take 10 mg by mouth nightly.    ONDANSETRON (ZOFRAN-ODT) 8 MG TBDL    Take 1 tablet (8 mg total) by mouth every 8 (eight) hours as needed (nausea).    PROCHLORPERAZINE (COMPAZINE) 10 MG TABLET    Take 1 tablet (10 mg total) by mouth 4 (four) times daily as needed (nausea).    PROPRANOLOL (INDERAL) 40 MG TABLET    Take 1 tablet (40 mg total) by mouth 2 (two) times daily.    TRAMADOL (ULTRAM) 50 MG TABLET    Take 1 tablet (50 mg total) by mouth every 6 (six) hours as needed for Pain.    VENLAFAXINE (EFFEXOR XR) 37.5 MG 24 HR CAPSULE    Take 1 capsule (37.5 mg total) by mouth once daily.   Discontinued Medications    LETROZOLE (FEMARA) 2.5 MG TAB    Take 1 tablet (2.5 mg total) by mouth once daily.     Review of patient's allergies indicates:   Allergen  Reactions    Pcn [penicillins] Rash       Lab: I have reviewed all of the patient's relevant lab work available in the medical record and have utilized this in my evaluation and management recommendations today    Imaging: I have reviewed all of the patient's diagnostic/imaging results available in the medical record and have utilized this in my evaluation and management recommendations today.      Objective:     Vitals:    10/09/19 1129   BP: 123/86   Pulse: 65   Temp: 97.5 °F (36.4 °C)       Physical Exam   Constitutional: She is oriented to person, place, and time. She appears well-developed and well-nourished. She is active and cooperative.   HENT:   Head: Normocephalic and atraumatic.   Nose: Nose normal.   Mouth/Throat: Oropharynx is clear and moist. No oropharyngeal exudate.   Eyes: Pupils are equal, round, and reactive to light. No scleral icterus.   Neck: Neck supple. No thyromegaly present.   Cardiovascular: Normal rate, regular rhythm, normal heart sounds and intact distal pulses.   No murmur heard.  Pulmonary/Chest: Effort normal and breath sounds normal. No stridor. No respiratory distress. She has no wheezes. She has no rales.   Abdominal: Soft. Bowel sounds are normal. She exhibits no distension, no ascites and no mass. There is no hepatosplenomegaly. There is no tenderness. There is no rigidity, no rebound and no guarding.   Musculoskeletal: She exhibits no edema or tenderness.   Lymphadenopathy:     She has no cervical adenopathy.   Neurological: She is alert and oriented to person, place, and time. No cranial nerve deficit. She exhibits normal muscle tone. Coordination normal.   Skin: Skin is warm and dry. No rash noted. No pallor.   Psychiatric: Her behavior is normal. Judgment and thought content normal. Her mood appears anxious. Cognition and memory are normal.   Nursing note and vitals reviewed.      Assessment:     1. Malignant neoplasm of upper-outer quadrant of right breast in female,  estrogen receptor positive    2. Chronic pain of breast    3. Hot flash due to medication        Plan:   1.  I had a detailed discussion with the patient today with regard to her current clinical situation.  Based on the patient's reported side effects I have recommended that she discontinue p.o. letrozole at this time.  Risks/benefits were reviewed in detail and she expressed understanding and agreement.  I will have the patient follow up with her primary oncologist Dr. Huynh in 2 weeks to discuss alternative adjuvant endocrine therapy.  2.  I have recommended that she continue p.o. gabapentin for a few more weeks to see if this will help with her chronic breast pain. She knows to keep herself well hydrated.  3.  She reports being up-to-date with influenza vaccination.    Follow-up in 2 weeks with labs.  She knows to call sooner if any new problems or questions.  Malignant neoplasm of upper-outer quadrant of right breast in female, estrogen receptor positive  -     CBC auto differential; Future; Expected date: 10/09/2019  -     CMP; Future; Expected date: 10/09/2019    Chronic pain of breast  -     CBC auto differential; Future; Expected date: 10/09/2019  -     CMP; Future; Expected date: 10/09/2019    Hot flash due to medication  -     CBC auto differential; Future; Expected date: 10/09/2019  -     CMP; Future; Expected date: 10/09/2019

## 2019-10-11 ENCOUNTER — TELEPHONE (OUTPATIENT)
Dept: PHARMACY | Facility: CLINIC | Age: 69
End: 2019-10-11

## 2019-10-11 RX ORDER — BUTALBITAL, ACETAMINOPHEN AND CAFFEINE 50; 325; 40 MG/1; MG/1; MG/1
CAPSULE ORAL
Qty: 30 CAPSULE | Refills: 0 | OUTPATIENT
Start: 2019-10-11

## 2019-10-11 NOTE — TELEPHONE ENCOUNTER
Patient was seen on 10/9. At appt, it was discussed for her to stop her letrozole for 10 days due to side effects. Patient has been experiencing severe hot flashes. She will follow up with Dr. Huynh on 10/21 to see how he would like to proceed. Patient currently has #15 tablets on hand.

## 2019-10-22 NOTE — TELEPHONE ENCOUNTER
LVM with patient to f/u with her after holding letrozole to see if this resolved side effects (espeically hot flashes). Appears appt with Dr. Huynh was rescheduled until 10/31 - will f/u with patient after this appt.  --  The patient returned call in regards to letrozole status. She confirmed hot flashes have completed resolved while holding letrozole. She continues to have breast pain. She asked if she should continue to hold letrozole or restart. Messaged Dr. Huynh's office to confirm if patient should restart letrozole OR continue to hold until seen in clinic on 10/31. Will relay response to patient.  --  10/23 @ 8:27am  Per Dr. Huynh, patient should restart letrozole. Contacted the patient to relay update - she will plan to restart today. She still has ~15 days on hand. [facility] will f/u with patient after appt on 10/31 to see if any changes are made at that time and set up refill if appropriate. Encouraged her to reach out with any questions/concerns in the meantime.

## 2019-10-25 ENCOUNTER — OFFICE VISIT (OUTPATIENT)
Dept: PULMONOLOGY | Facility: CLINIC | Age: 69
End: 2019-10-25
Payer: COMMERCIAL

## 2019-10-25 VITALS
RESPIRATION RATE: 20 BRPM | HEART RATE: 52 BPM | WEIGHT: 293 LBS | HEIGHT: 70 IN | SYSTOLIC BLOOD PRESSURE: 142 MMHG | OXYGEN SATURATION: 96 % | DIASTOLIC BLOOD PRESSURE: 86 MMHG | BODY MASS INDEX: 41.95 KG/M2

## 2019-10-25 DIAGNOSIS — G47.33 OSA (OBSTRUCTIVE SLEEP APNEA): Primary | Chronic | ICD-10-CM

## 2019-10-25 DIAGNOSIS — G47.34 NOCTURNAL HYPOXEMIA: ICD-10-CM

## 2019-10-25 DIAGNOSIS — R06.02 SHORTNESS OF BREATH ON EXERTION: ICD-10-CM

## 2019-10-25 DIAGNOSIS — E66.01 MORBID OBESITY WITH BMI OF 45.0-49.9, ADULT: ICD-10-CM

## 2019-10-25 PROCEDURE — 1101F PT FALLS ASSESS-DOCD LE1/YR: CPT | Mod: CPTII,S$GLB,, | Performed by: NURSE PRACTITIONER

## 2019-10-25 PROCEDURE — 3077F PR MOST RECENT SYSTOLIC BLOOD PRESSURE >= 140 MM HG: ICD-10-PCS | Mod: CPTII,S$GLB,, | Performed by: NURSE PRACTITIONER

## 2019-10-25 PROCEDURE — 99999 PR PBB SHADOW E&M-EST. PATIENT-LVL III: CPT | Mod: PBBFAC,,, | Performed by: NURSE PRACTITIONER

## 2019-10-25 PROCEDURE — 1101F PR PT FALLS ASSESS DOC 0-1 FALLS W/OUT INJ PAST YR: ICD-10-PCS | Mod: CPTII,S$GLB,, | Performed by: NURSE PRACTITIONER

## 2019-10-25 PROCEDURE — 3079F PR MOST RECENT DIASTOLIC BLOOD PRESSURE 80-89 MM HG: ICD-10-PCS | Mod: CPTII,S$GLB,, | Performed by: NURSE PRACTITIONER

## 2019-10-25 PROCEDURE — 99999 PR PBB SHADOW E&M-EST. PATIENT-LVL III: ICD-10-PCS | Mod: PBBFAC,,, | Performed by: NURSE PRACTITIONER

## 2019-10-25 PROCEDURE — 99214 OFFICE O/P EST MOD 30 MIN: CPT | Mod: S$GLB,,, | Performed by: NURSE PRACTITIONER

## 2019-10-25 PROCEDURE — 99214 PR OFFICE/OUTPT VISIT, EST, LEVL IV, 30-39 MIN: ICD-10-PCS | Mod: S$GLB,,, | Performed by: NURSE PRACTITIONER

## 2019-10-25 PROCEDURE — 3077F SYST BP >= 140 MM HG: CPT | Mod: CPTII,S$GLB,, | Performed by: NURSE PRACTITIONER

## 2019-10-25 PROCEDURE — 3079F DIAST BP 80-89 MM HG: CPT | Mod: CPTII,S$GLB,, | Performed by: NURSE PRACTITIONER

## 2019-10-25 RX ORDER — HYDROCHLOROTHIAZIDE 12.5 MG/1
12.5 CAPSULE ORAL DAILY
Qty: 90 CAPSULE | Refills: 1 | Status: SHIPPED | OUTPATIENT
Start: 2019-10-25 | End: 2019-11-11

## 2019-10-25 RX ORDER — GABAPENTIN 100 MG/1
100 CAPSULE ORAL 3 TIMES DAILY
Qty: 90 CAPSULE | Refills: 0 | Status: SHIPPED | OUTPATIENT
Start: 2019-10-25 | End: 2019-10-31 | Stop reason: SDUPTHER

## 2019-10-25 NOTE — PATIENT INSTRUCTIONS
Continuous Positive Air Pressure (CPAP)     A mask over the nose gently directs air into the throat to keep the airway open.   Continuous positive air pressure (CPAP) uses gentle air pressure to hold the airway open. CPAP is often the most effective treatment for sleep apnea and severe snoring. It works very well for many people. But keep in mind that it can take several adjustments before the setup is right for you.  How CPAP works  The CPAP machine  is a small portable pump beside the bed. The pump sends air through a hose, which is held over your nose and mouth by a mask. Mild air pressure is gently pushed through your airway. The air pressure nudges sagging tissues aside. This widens the airway so you can breathe better. CPAP may be combined with other kinds of therapy for sleep apnea.  Types of air pressure treatments  There are different types of CPAP. Your doctor or CPAP technician will help you decide which type is best for you:  · Basic CPAP keeps the pressure constant all night long.  · A bilevel device (BiPAP) provides more pressure when you breathe in and less when you breathe out. A BiPAP machine also may be set to provide automatic breaths to maintain breathing if you stop breathing while sleeping.  · An autoCPAP device automatically adjusts pressure throughout the night and in response to changes such as body position, sleep stage, and snoring.  Date Last Reviewed: 8/10/2015  © 4755-3283 [employer]. [address] [place] [address]. All rights reserved. This information is not intended as a substitute for professional medical care. Always follow your healthcare professional's instructions.        What Are Snoring and Obstructive Sleep Apnea?  If youve ever had a stuffed-up nose, you know the feeling of trying to breathe through a very narrow passageway. This is what happens in your throat when you snore. While you sleep, structures in your throat partially block your air  passage, making the passage narrow and hard to breathe through. If the entire passage becomes blocked and you cant breathe at all, you have sleep apnea.      Snoring Obstructive sleep apnea   Snoring  If your throat structures are too large or the muscles relax too much during sleep, the air passage may be partially blocked. As air from the nose or mouth passes around this blockage, the throat structures vibrate, causing the familiar sound of snoring. At times, this sound can be so loud that snorers wake up others, or even themselves, during the night. Snoring gets worse as more and more of the air passage is blocked.  Obstructive sleep apnea  If the structures completely block the throat, air cant flow to the lungs at all. This is called apnea (meaning no breathing). Since the lungs arent getting fresh air, the brain tells the body to wake up just enough to tighten the muscles and unblock the air passage. With a loud gasp, breathing begins again. This process may be repeated over and over again throughout the night, making your sleep fragmented with a lighter stage of sleep. Even though you do not remember waking up many times during the night to a lighter sleep, you feel tired the next day. The lack of sleep and fresh air can also strain your lungs, heart, and other organs, leading to problems such as high blood pressure, heart attack, or stroke.  Problems in the nose and jaw  Problems in the structure of the nose may obstruct breathing. A crooked (deviated) septum or swollen turbinates can make snoring worse or lead to apnea. Also, a receding jaw may make the tongue sit too far back, so its more likely to block the airway when youre asleep.        Date Last Reviewed: 7/18/2015  © 2819-0638 [employer]. [address] [place] [address], [place] [address]. All rights reserved. This information is not intended as a substitute for professional medical care. Always follow your healthcare professional's  instructions.

## 2019-10-25 NOTE — ASSESSMENT & PLAN NOTE
Shortness of breath with exertion chronic   Return for complete PFT, ABG, 6 min walk distance, patient opts to have done when she returns in January 2020.  Continue to work on weight loss BMI 46.9.  Weight 322 lb.  Height 5 ft 9 in.  Thinking about joined the [facility]

## 2019-10-25 NOTE — ASSESSMENT & PLAN NOTE
8/5/2019 PSG revealed severe obstructive sleep apnea AHI 53.8.    CPAP titration 10/1/2019 revealed CPAP therapy on 20 cm H2O with a Medium size Resmed Full Face Mask AirFit F20 mask and heated humidification. trial of auto PAP 10-20 cm water pressure could be used as alternative. Consider BiPAP if patient intolerant to high pressures.   begin auto CPAP 10-20 cm with fullface

## 2019-10-25 NOTE — LETTER
October 25, 2019      Kennedy Rojas MD  [address]  [place] [address]           [facility]  [address] [place] [id number]  Phone: [phone]  Fax: [phone]          Patient: Ssuu Luther   MR Number: [id number]   YOB: 1950   Date of Visit: 10/25/2019       Dear Dr. Kennedy Rojas:    Thank you for referring Susu Luther to me for evaluation. Attached you will find relevant portions of my assessment and plan of care.    If you have questions, please do not hesitate to call me. I look forward to following Susu Luther along with you.    Sincerely,    Blank Cortez, NP    Enclosure  CC:  No Recipients    If you would like to receive this communication electronically, please contact [email or web address] or [phone] to request more information on [facility] access.    For providers and/or their staff who would like to refer a patient to [facility], please contact us through our one-stop-shop provider referral line, [facility], at [phone].    If you feel you have received this communication in error or would no longer like to receive these types of communications, please e-mail [email or web address]

## 2019-10-25 NOTE — PROGRESS NOTES
Subjective:      Patient ID: Susu Luther is a 69 y.o. female.    Patient Active Problem List   Diagnosis    Shortness of breath on exertion    NILS (obstructive sleep apnea)    Malignant neoplasm of upper-outer quadrant of right breast in female, estrogen receptor positive    Hypertension    Stroke    Morbid obesity with BMI of 45.0-49.9, adult    Headache    Circadian rhythm sleep disorder    Nocturnal hypoxemia    Chronic pain of breast     she has been referred by Kennedy Rojas MD for evaluation and management for   Chief Complaint   Patient presents with    Sleep Apnea    Shortness of Breath     Chief Complaint: Sleep Apnea and Shortness of Breath     Sleep Apnea:  She presents for NILS with review of 2019 PSG revealed severe obstructive sleep apnea AHI 53.8.  CPAP titration 10/1/2019 revealed CPAP therapy on 20 cm H2O with a Medium size Resmed Full Face Mask AirFit F20 mask and heated humidification. trial of auto PAP 10-20 cm water pressure could be used as alternative. Consider BiPAP if patient intolerant to high pressures.  Orders to begin auto CPAP 10-20 cm with fullface    Shortness of breath, chronic  Patient also had complained of shortness of Breath at her initial visit with me 2019.  Complete PFT ABG, 6 min walk distance were not done.    [facility] 2018 not able to do perform testing test results were not interpretable.  Patient would like to proceed with testing ordered she would like to return in 2020 when she is due her compliance download for CPAP.    Previous Report Reviewed: lab reports, office notes and radiology reports     Past Medical History: The following portions of the patient's history were reviewed and updated as appropriate:   She  has a past surgical history that includes Oophorectomy;  section; Appendectomy; [place] lymph node biopsy (Right, 3/27/2019); Breast lumpectomy; and Breast biopsy.  Her family history includes Diabetes in her  maternal grandfather, maternal grandmother, and mother.  She  reports that she has never smoked. She has never used smokeless tobacco. She reports that she does not drink alcohol or use drugs.  She has a current medication list which includes the following prescription(s): albuterol, aspirin, atorvastatin, blood pressure kit med and lrg, butalbital-acetaminophen-caff -40 mg, cholecalciferol (vitamin d3), clonidine, diazepam, diclofenac, emollient combination no.63, furosemide, gabapentin, gabapentin, hydrochlorothiazide, inhalation spacing device, lidocaine, lidocaine hcl-menthol, losartan, losartan, morphine, naproxen, narcan, olanzapine, ondansetron, prochlorperazine, propranolol, tramadol, and venlafaxine.  She is allergic to pcn [penicillins].    Review of Systems   Constitutional: Negative for fever, chills, weight loss, weight gain, activity change, appetite change, fatigue and night sweats.   HENT: Negative for postnasal drip, rhinorrhea, sinus pressure, voice change and congestion.    Eyes: Negative for redness and itching.   Respiratory: Positive for dyspnea on extertion (chronic). Negative for snoring, cough, sputum production, chest tightness, shortness of breath, wheezing, orthopnea, asthma nighttime symptoms, use of rescue inhaler and somnolence.    Cardiovascular: Negative.  Negative for chest pain, palpitations and leg swelling.   Genitourinary: Negative for difficulty urinating and hematuria.   Endocrine: Negative for cold intolerance and heat intolerance.    Musculoskeletal: Negative for arthralgias, gait problem, joint swelling and myalgias.   Skin: Negative.    Gastrointestinal: Negative for nausea, vomiting, abdominal pain and acid reflux.   Neurological: Negative for dizziness, weakness, light-headedness and headaches.   Hematological: Negative for adenopathy. No excessive bruising.   All other systems reviewed and are negative.     Objective:   BP (!) 142/86   Pulse (!) 52   Resp 20    " 5' 9.5" (1.765 m)   Wt (!) 146.4 kg (322 lb 12.1 oz)   LMP  (LMP Unknown)   SpO2 96%   BMI 46.98 kg/m²   Physical Exam   Constitutional: She is oriented to person, place, and time. She appears well-developed and well-nourished. She is active and cooperative.  Non-toxic appearance. She does not appear ill. No distress.   HENT:   Head: Normocephalic.   Right Ear: External ear normal.   Left Ear: External ear normal.   Nose: Nose normal.   Mouth/Throat: Oropharynx is clear and moist. No oropharyngeal exudate.   Neck circumference is 16.5 inches.  (42 cm.)  Mallampati score 4     Eyes: Conjunctivae are normal.   Neck: Normal range of motion. Neck supple.   Cardiovascular: Normal rate, regular rhythm, normal heart sounds and intact distal pulses.   Pulmonary/Chest: Effort normal and breath sounds normal. No stridor. She has no wheezes. She has no rales.   Abdominal: Soft.   Musculoskeletal: Normal range of motion.   Lymphadenopathy:     She has no cervical adenopathy.   Neurological: She is alert and oriented to person, place, and time.   Skin: Skin is warm and dry.   Psychiatric: She has a normal mood and affect. Her behavior is normal. Judgment and thought content normal.   Vitals reviewed.    Personal Diagnostic Review  CPAP titration 10/1/2019  EKG showed no cardiac abnormalities.  No snoring was audible during this study.  Mild Oxygen Desaturation  Moderate Obstructive Sleep apnea(NILS) Optimal pressure attained. CPAP 20 cm water pressure was  optimal  No Significant Central Sleep Apnea (CSA)  No significant periodic leg movements(PLMs) during sleep.  Reduced sleep efficiency, normal primary sleep latency, normal REM sleep latency and no slow wave  latency.  Supplemental oxygen was not administered during the study.    Trial of CPAP therapy on 20 cm H2O with a Medium size Resmed Full Face Mask AirFit F20 mask and  heated humidification. trial of auto PAP 10-20 cm water pressure could be used as alternative. " Consider  BiPAP if patient intolerant to high pressures  Avoid alcohol, sedatives and other CNS depressants that may worsen sleep apnea and disrupt normal sleep  architecture.  Sleep hygiene should be reviewed to assess factors that may improve sleep quality.  Weight management and regular exercise should be initiated or continued under supervison of clinician.  [facility] after 4 -6 weeks of therapy     The overall AHI was 15.8 per hour, and the RDI was 15.8 events/hour with a central apnea index of 0.0per hour.  The most appropriate setting of CPAP was 20 cm H2O. At this setting, the sleep efficiency was 60% and the  patient was supine for 100%. The AHI was 0.0 events per hour, and the RDI was 0.0 events/hour (with 0.0 central  events) and the arousal index was 11.1 per hour.The oxygen marcia was 91.0% during sleep.  The cumulative time under 88% oxygen saturation was 0.9 minutes.    08/05/2019 PSG   Severe Obstructive Sleep apnea(NILS): overall AHI was 53.8 events per hour. REM AHI was 105.2 events  per hour.  EKG showed no cardiac abnormalities.  Moderate Oxygen Desaturation  The patient snored with moderate snoring volume.  No significant periodic leg movements(PLMs) during sleep.  Reduced total sleep time may underestimate the true severity of her sleep disorder breathing.     There were a total of 160 respiratory disturbances out of which 41 were apneas ( 41 obstructive, 0 mixed, 0 central)  and 119 hypopneas. The apnea/hypopnea index (AHI) was 53.5 events/hour. The central sleep apnea index was  0.0 events/hour. The REM AHI was 105.2 events/hour and NREM AHI was 40.3 events/hour. The supine AHI was  53.5 events/hour and the non supine AHI was 0 supine during 100.00% of sleep. Respiratory disturbances were  associated with oxygen desaturation down to a marcia of 69.0% during sleep. The mean oxygen saturation during the  study was 91.1%. The cumulative time under 88% oxygen  saturation was 26.8 minutes.     Obstructive Sleep Apnea (G47.33)  Nocturnal Hypoxemia (G47.36)  Circardian rhythm disorder  Insomnia related to Sleep initiation/sleep maintaince/terminal  Therapeutic CPAP titration to determine optimal pressure required to alleviate sleep disordered breathing.     Assessment:     1. NILS (obstructive sleep apnea)    2. Morbid obesity with BMI of 45.0-49.9, adult    3. Nocturnal hypoxemia    4. Shortness of breath on exertion      Orders Placed This Encounter   Procedures    CPAP FOR HOME USE     PSG 8/5/2019 severe NILS AHI 53.8     Order Specific Question:   Type:     Answer:   Auto CPAP     Order Specific Question:   Auto CPAP pressure setting range (cmH20):     Answer:   10-20     Order Specific Question:   Length of need (1-99 months):     Answer:   99     Order Specific Question:   Humidification:     Answer:   Heated     Order Specific Question:   Type of mask:     Answer:   FFM     Order Specific Question:   Headgear?     Answer:   Yes     Order Specific Question:   Tubing?     Answer:   Yes     Order Specific Question:   Humidifier chamber?     Answer:   Yes     Order Specific Question:   Chin strap?     Answer:   Yes     Order Specific Question:   Filters?     Answer:   Yes     Order Specific Question:   Cushions?     Answer:   Yes     Plan:   Discussed diagnosis, its evaluation, treatment and usual course. All questions answered.  Problem List Items Addressed This Visit     Shortness of breath on exertion    Current Assessment & Plan     Shortness of breath with exertion chronic   Return for complete PFT, ABG, 6 min walk distance, patient opts to have done when she returns in January 2020.  Continue to work on weight loss BMI 46.9.  Weight 322 lb.  Height 5 ft 9 in.  Thinking about joined the [facility]         NILS (obstructive sleep apnea) - Primary (Chronic)    Overview     08/05/2019 PSG Severe Obstructive Sleep apnea(NILS): overall AHI was 53.8 events per hour. REM AHI was  105.2 events  per hour. The mean oxygen saturation during the study was 91.1%. The cumulative time under 88% oxygen saturation was 26.8 minutes.  10/1/2019 CPAP titration revealed CPAP therapy on 20 cm H2O with a Medium size Resmed Full Face Mask AirFit F20 mask and heated humidification. trial of auto PAP 10-20 cm water pressure could be used as alternative. Consider BiPAP if patient intolerant to high pressures.  10/25/2019 begin auto CPAP 10-20 cm with fullface           Current Assessment & Plan     8/5/2019 PSG revealed severe obstructive sleep apnea AHI 53.8.    CPAP titration 10/1/2019 revealed CPAP therapy on 20 cm H2O with a Medium size Resmed Full Face Mask AirFit F20 mask and heated humidification. trial of auto PAP 10-20 cm water pressure could be used as alternative. Consider BiPAP if patient intolerant to high pressures.   begin auto CPAP 10-20 cm with fullface           Relevant Orders    CPAP FOR HOME USE    Nocturnal hypoxemia    Overview     Corrected during CPAP titration 10/1/2019, cumulative time under 88% oxygen saturation was 0.9 minutes.  10/25/2019 begin auto CPAP 10-20 cm             Current Assessment & Plan     Corrected during CPAP titration 10/1/2019, cumulative time under 88% oxygen saturation was 0.9 minutes.  10/25/2019 begin auto CPAP 10-20 cm           Morbid obesity with BMI of 45.0-49.9, adult    Current Assessment & Plan     Encouraged calorie reduction and 30 minutes of exercise daily. Discussed impact of obesity on general health.               Follow up in about 11 weeks (around 1/10/2020) for CPAP compliance download after initial set up. SOB cpft/abg/6mwd.

## 2019-10-25 NOTE — ASSESSMENT & PLAN NOTE
Corrected during CPAP titration 10/1/2019, cumulative time under 88% oxygen saturation was 0.9 minutes.  10/25/2019 begin auto CPAP 10-20 cm

## 2019-10-31 ENCOUNTER — OFFICE VISIT (OUTPATIENT)
Dept: INTERNAL MEDICINE | Facility: CLINIC | Age: 69
End: 2019-10-31
Payer: COMMERCIAL

## 2019-10-31 ENCOUNTER — OFFICE VISIT (OUTPATIENT)
Dept: HEMATOLOGY/ONCOLOGY | Facility: CLINIC | Age: 69
End: 2019-10-31
Payer: COMMERCIAL

## 2019-10-31 ENCOUNTER — LAB VISIT (OUTPATIENT)
Dept: LAB | Facility: HOSPITAL | Age: 69
End: 2019-10-31
Attending: INTERNAL MEDICINE
Payer: COMMERCIAL

## 2019-10-31 ENCOUNTER — HOSPITAL ENCOUNTER (EMERGENCY)
Facility: HOSPITAL | Age: 69
Discharge: HOME | End: 2019-10-31
Attending: FAMILY MEDICINE
Payer: COMMERCIAL

## 2019-10-31 VITALS
DIASTOLIC BLOOD PRESSURE: 90 MMHG | SYSTOLIC BLOOD PRESSURE: 183 MMHG | WEIGHT: 293 LBS | TEMPERATURE: 98 F | BODY MASS INDEX: 43.4 KG/M2 | RESPIRATION RATE: 23 BRPM | HEIGHT: 69 IN | OXYGEN SATURATION: 100 % | HEART RATE: 54 BPM

## 2019-10-31 VITALS
BODY MASS INDEX: 43.4 KG/M2 | RESPIRATION RATE: 18 BRPM | SYSTOLIC BLOOD PRESSURE: 186 MMHG | WEIGHT: 293 LBS | DIASTOLIC BLOOD PRESSURE: 100 MMHG | OXYGEN SATURATION: 96 % | HEART RATE: 50 BPM | HEIGHT: 69 IN

## 2019-10-31 VITALS
TEMPERATURE: 96 F | OXYGEN SATURATION: 96 % | BODY MASS INDEX: 43.4 KG/M2 | HEART RATE: 78 BPM | HEIGHT: 69 IN | WEIGHT: 293 LBS | SYSTOLIC BLOOD PRESSURE: 220 MMHG | DIASTOLIC BLOOD PRESSURE: 110 MMHG

## 2019-10-31 DIAGNOSIS — I10 ESSENTIAL HYPERTENSION: ICD-10-CM

## 2019-10-31 DIAGNOSIS — R53.1 WEAKNESS: ICD-10-CM

## 2019-10-31 DIAGNOSIS — E66.01 MORBID OBESITY WITH BMI OF 45.0-49.9, ADULT: ICD-10-CM

## 2019-10-31 DIAGNOSIS — N64.4 CHRONIC PAIN OF BREAST: ICD-10-CM

## 2019-10-31 DIAGNOSIS — R51.9 NONINTRACTABLE HEADACHE, UNSPECIFIED CHRONICITY PATTERN, UNSPECIFIED HEADACHE TYPE: ICD-10-CM

## 2019-10-31 DIAGNOSIS — T50.905A HOT FLASH DUE TO MEDICATION: ICD-10-CM

## 2019-10-31 DIAGNOSIS — I10 ESSENTIAL HYPERTENSION: Primary | ICD-10-CM

## 2019-10-31 DIAGNOSIS — C50.411 MALIGNANT NEOPLASM OF UPPER-OUTER QUADRANT OF RIGHT BREAST IN FEMALE, ESTROGEN RECEPTOR POSITIVE: Chronic | ICD-10-CM

## 2019-10-31 DIAGNOSIS — R23.2 HOT FLASH DUE TO MEDICATION: ICD-10-CM

## 2019-10-31 DIAGNOSIS — I16.0 HYPERTENSIVE URGENCY: Primary | ICD-10-CM

## 2019-10-31 DIAGNOSIS — Z17.0 MALIGNANT NEOPLASM OF UPPER-OUTER QUADRANT OF RIGHT BREAST IN FEMALE, ESTROGEN RECEPTOR POSITIVE: Chronic | ICD-10-CM

## 2019-10-31 DIAGNOSIS — G89.29 CHRONIC PAIN OF BREAST: ICD-10-CM

## 2019-10-31 DIAGNOSIS — Z86.73 HISTORY OF STROKE: ICD-10-CM

## 2019-10-31 DIAGNOSIS — C50.411 MALIGNANT NEOPLASM OF UPPER-OUTER QUADRANT OF RIGHT BREAST IN FEMALE, ESTROGEN RECEPTOR POSITIVE: Primary | Chronic | ICD-10-CM

## 2019-10-31 DIAGNOSIS — Z17.0 MALIGNANT NEOPLASM OF UPPER-OUTER QUADRANT OF RIGHT BREAST IN FEMALE, ESTROGEN RECEPTOR POSITIVE: Primary | Chronic | ICD-10-CM

## 2019-10-31 DIAGNOSIS — I16.0 HYPERTENSIVE URGENCY: ICD-10-CM

## 2019-10-31 DIAGNOSIS — F32.A DEPRESSION, UNSPECIFIED DEPRESSION TYPE: ICD-10-CM

## 2019-10-31 DIAGNOSIS — F41.9 ANXIETY: ICD-10-CM

## 2019-10-31 LAB
ALBUMIN SERPL BCP-MCNC: 3.2 G/DL (ref 3.5–5.2)
ALP SERPL-CCNC: 113 U/L (ref 55–135)
ALT SERPL W/O P-5'-P-CCNC: 12 U/L (ref 10–44)
ANION GAP SERPL CALC-SCNC: 7 MMOL/L (ref 8–16)
AST SERPL-CCNC: 12 U/L (ref 10–40)
BASOPHILS # BLD AUTO: 0.06 K/UL (ref 0–0.2)
BASOPHILS NFR BLD: 0.9 % (ref 0–1.9)
BILIRUB SERPL-MCNC: 0.3 MG/DL (ref 0.1–1)
BUN SERPL-MCNC: 17 MG/DL (ref 8–23)
CALCIUM SERPL-MCNC: 9.8 MG/DL (ref 8.7–10.5)
CHLORIDE SERPL-SCNC: 108 MMOL/L (ref 95–110)
CO2 SERPL-SCNC: 29 MMOL/L (ref 23–29)
CREAT SERPL-MCNC: 1.4 MG/DL (ref 0.5–1.4)
DIFFERENTIAL METHOD: ABNORMAL
EOSINOPHIL # BLD AUTO: 0.3 K/UL (ref 0–0.5)
EOSINOPHIL NFR BLD: 4.5 % (ref 0–8)
ERYTHROCYTE [DISTWIDTH] IN BLOOD BY AUTOMATED COUNT: 17.5 % (ref 11.5–14.5)
EST. GFR  (AFRICAN AMERICAN): 45 ML/MIN/1.73 M^2
EST. GFR  (NON AFRICAN AMERICAN): 39 ML/MIN/1.73 M^2
GLUCOSE SERPL-MCNC: 96 MG/DL (ref 70–110)
HCT VFR BLD AUTO: 36.8 % (ref 37–48.5)
HGB BLD-MCNC: 11.1 G/DL (ref 12–16)
IMM GRANULOCYTES # BLD AUTO: 0.02 K/UL (ref 0–0.04)
IMM GRANULOCYTES NFR BLD AUTO: 0.3 % (ref 0–0.5)
LYMPHOCYTES # BLD AUTO: 1.5 K/UL (ref 1–4.8)
LYMPHOCYTES NFR BLD: 22.5 % (ref 18–48)
MCH RBC QN AUTO: 23.8 PG (ref 27–31)
MCHC RBC AUTO-ENTMCNC: 30.2 G/DL (ref 32–36)
MCV RBC AUTO: 79 FL (ref 82–98)
MONOCYTES # BLD AUTO: 0.6 K/UL (ref 0.3–1)
MONOCYTES NFR BLD: 9.1 % (ref 4–15)
NEUTROPHILS # BLD AUTO: 4.1 K/UL (ref 1.8–7.7)
NEUTROPHILS NFR BLD: 63 % (ref 38–73)
NRBC BLD-RTO: 0 /100 WBC
PLATELET # BLD AUTO: 232 K/UL (ref 150–350)
PMV BLD AUTO: 9.3 FL (ref 9.2–12.9)
POTASSIUM SERPL-SCNC: 4.2 MMOL/L (ref 3.5–5.1)
PROT SERPL-MCNC: 7.4 G/DL (ref 6–8.4)
RBC # BLD AUTO: 4.67 M/UL (ref 4–5.4)
SODIUM SERPL-SCNC: 144 MMOL/L (ref 136–145)
WBC # BLD AUTO: 6.45 K/UL (ref 3.9–12.7)

## 2019-10-31 PROCEDURE — 99214 OFFICE O/P EST MOD 30 MIN: CPT | Mod: S$GLB,,, | Performed by: NURSE PRACTITIONER

## 2019-10-31 PROCEDURE — 3077F PR MOST RECENT SYSTOLIC BLOOD PRESSURE >= 140 MM HG: ICD-10-PCS | Mod: CPTII,S$GLB,, | Performed by: INTERNAL MEDICINE

## 2019-10-31 PROCEDURE — 96375 TX/PRO/DX INJ NEW DRUG ADDON: CPT

## 2019-10-31 PROCEDURE — 99215 OFFICE O/P EST HI 40 MIN: CPT | Mod: S$GLB,,, | Performed by: INTERNAL MEDICINE

## 2019-10-31 PROCEDURE — 99214 PR OFFICE/OUTPT VISIT, EST, LEVL IV, 30-39 MIN: ICD-10-PCS | Mod: S$GLB,,, | Performed by: NURSE PRACTITIONER

## 2019-10-31 PROCEDURE — 63600175 PHARM REV CODE 636 W HCPCS: Performed by: EMERGENCY MEDICINE

## 2019-10-31 PROCEDURE — 99999 PR PBB SHADOW E&M-EST. PATIENT-LVL V: ICD-10-PCS | Mod: PBBFAC,,, | Performed by: NURSE PRACTITIONER

## 2019-10-31 PROCEDURE — 3077F SYST BP >= 140 MM HG: CPT | Mod: CPTII,S$GLB,, | Performed by: INTERNAL MEDICINE

## 2019-10-31 PROCEDURE — 96374 THER/PROPH/DIAG INJ IV PUSH: CPT

## 2019-10-31 PROCEDURE — 93010 ELECTROCARDIOGRAM REPORT: CPT | Mod: ,,, | Performed by: INTERNAL MEDICINE

## 2019-10-31 PROCEDURE — 99999 PR PBB SHADOW E&M-EST. PATIENT-LVL V: CPT | Mod: PBBFAC,,, | Performed by: INTERNAL MEDICINE

## 2019-10-31 PROCEDURE — 99284 EMERGENCY DEPT VISIT MOD MDM: CPT | Mod: 25

## 2019-10-31 PROCEDURE — 93005 ELECTROCARDIOGRAM TRACING: CPT

## 2019-10-31 PROCEDURE — 3077F PR MOST RECENT SYSTOLIC BLOOD PRESSURE >= 140 MM HG: ICD-10-PCS | Mod: CPTII,S$GLB,, | Performed by: NURSE PRACTITIONER

## 2019-10-31 PROCEDURE — 63600175 PHARM REV CODE 636 W HCPCS: Performed by: NURSE PRACTITIONER

## 2019-10-31 PROCEDURE — 3080F PR MOST RECENT DIASTOLIC BLOOD PRESSURE >= 90 MM HG: ICD-10-PCS | Mod: CPTII,S$GLB,, | Performed by: NURSE PRACTITIONER

## 2019-10-31 PROCEDURE — 3080F DIAST BP >= 90 MM HG: CPT | Mod: CPTII,S$GLB,, | Performed by: INTERNAL MEDICINE

## 2019-10-31 PROCEDURE — 1101F PT FALLS ASSESS-DOCD LE1/YR: CPT | Mod: CPTII,S$GLB,, | Performed by: INTERNAL MEDICINE

## 2019-10-31 PROCEDURE — 80053 COMPREHEN METABOLIC PANEL: CPT

## 2019-10-31 PROCEDURE — 99999 PR PBB SHADOW E&M-EST. PATIENT-LVL V: CPT | Mod: PBBFAC,,, | Performed by: NURSE PRACTITIONER

## 2019-10-31 PROCEDURE — 1101F PR PT FALLS ASSESS DOC 0-1 FALLS W/OUT INJ PAST YR: ICD-10-PCS | Mod: CPTII,S$GLB,, | Performed by: INTERNAL MEDICINE

## 2019-10-31 PROCEDURE — 99999 PR PBB SHADOW E&M-EST. PATIENT-LVL V: ICD-10-PCS | Mod: PBBFAC,,, | Performed by: INTERNAL MEDICINE

## 2019-10-31 PROCEDURE — 3077F SYST BP >= 140 MM HG: CPT | Mod: CPTII,S$GLB,, | Performed by: NURSE PRACTITIONER

## 2019-10-31 PROCEDURE — 1101F PR PT FALLS ASSESS DOC 0-1 FALLS W/OUT INJ PAST YR: ICD-10-PCS | Mod: CPTII,S$GLB,, | Performed by: NURSE PRACTITIONER

## 2019-10-31 PROCEDURE — 1101F PT FALLS ASSESS-DOCD LE1/YR: CPT | Mod: CPTII,S$GLB,, | Performed by: NURSE PRACTITIONER

## 2019-10-31 PROCEDURE — 93010 EKG 12-LEAD: ICD-10-PCS | Mod: ,,, | Performed by: INTERNAL MEDICINE

## 2019-10-31 PROCEDURE — 3080F DIAST BP >= 90 MM HG: CPT | Mod: CPTII,S$GLB,, | Performed by: NURSE PRACTITIONER

## 2019-10-31 PROCEDURE — 99215 PR OFFICE/OUTPT VISIT, EST, LEVL V, 40-54 MIN: ICD-10-PCS | Mod: S$GLB,,, | Performed by: INTERNAL MEDICINE

## 2019-10-31 PROCEDURE — 25000003 PHARM REV CODE 250: Performed by: NURSE PRACTITIONER

## 2019-10-31 PROCEDURE — 3080F PR MOST RECENT DIASTOLIC BLOOD PRESSURE >= 90 MM HG: ICD-10-PCS | Mod: CPTII,S$GLB,, | Performed by: INTERNAL MEDICINE

## 2019-10-31 PROCEDURE — 36415 COLL VENOUS BLD VENIPUNCTURE: CPT

## 2019-10-31 PROCEDURE — 85025 COMPLETE CBC W/AUTO DIFF WBC: CPT

## 2019-10-31 RX ORDER — HYDRALAZINE HYDROCHLORIDE 20 MG/ML
10 INJECTION INTRAMUSCULAR; INTRAVENOUS
Status: COMPLETED | OUTPATIENT
Start: 2019-10-31 | End: 2019-10-31

## 2019-10-31 RX ORDER — OXYCODONE AND ACETAMINOPHEN 10; 325 MG/1; MG/1
1 TABLET ORAL
Status: COMPLETED | OUTPATIENT
Start: 2019-10-31 | End: 2019-10-31

## 2019-10-31 RX ORDER — DIPHENHYDRAMINE HYDROCHLORIDE 50 MG/ML
12.5 INJECTION INTRAMUSCULAR; INTRAVENOUS
Status: COMPLETED | OUTPATIENT
Start: 2019-10-31 | End: 2019-10-31

## 2019-10-31 RX ORDER — GABAPENTIN 100 MG/1
100 CAPSULE ORAL 3 TIMES DAILY
Qty: 90 CAPSULE | Refills: 2 | Status: SHIPPED | OUTPATIENT
Start: 2019-10-31 | End: 2019-11-30

## 2019-10-31 RX ORDER — CLONIDINE HYDROCHLORIDE 0.1 MG/1
0.1 TABLET ORAL
Status: COMPLETED | OUTPATIENT
Start: 2019-10-31 | End: 2019-10-31

## 2019-10-31 RX ORDER — VENLAFAXINE HYDROCHLORIDE 75 MG/1
75 CAPSULE, EXTENDED RELEASE ORAL DAILY
Qty: 30 CAPSULE | Refills: 2 | Status: SHIPPED | OUTPATIENT
Start: 2019-10-31 | End: 2019-10-31 | Stop reason: SDUPTHER

## 2019-10-31 RX ORDER — TRAMADOL HYDROCHLORIDE 50 MG/1
50 TABLET ORAL EVERY 6 HOURS PRN
Qty: 50 TABLET | Refills: 0 | Status: SHIPPED | OUTPATIENT
Start: 2019-10-31 | End: 2019-10-31 | Stop reason: SDUPTHER

## 2019-10-31 RX ORDER — GABAPENTIN 100 MG/1
100 CAPSULE ORAL 3 TIMES DAILY
Qty: 90 CAPSULE | Refills: 2 | Status: SHIPPED | OUTPATIENT
Start: 2019-10-31 | End: 2019-10-31 | Stop reason: SDUPTHER

## 2019-10-31 RX ORDER — BUTALBITAL, ACETAMINOPHEN AND CAFFEINE 50; 325; 40 MG/1; MG/1; MG/1
1 CAPSULE ORAL 3 TIMES DAILY
Qty: 30 CAPSULE | Refills: 0 | Status: SHIPPED | OUTPATIENT
Start: 2019-10-31 | End: 2019-10-31 | Stop reason: SDUPTHER

## 2019-10-31 RX ORDER — VENLAFAXINE HYDROCHLORIDE 75 MG/1
75 CAPSULE, EXTENDED RELEASE ORAL DAILY
Qty: 30 CAPSULE | Refills: 2 | Status: SHIPPED | OUTPATIENT
Start: 2019-10-31 | End: 2019-11-11 | Stop reason: SINTOL

## 2019-10-31 RX ORDER — METOCLOPRAMIDE HYDROCHLORIDE 5 MG/ML
10 INJECTION INTRAMUSCULAR; INTRAVENOUS
Status: COMPLETED | OUTPATIENT
Start: 2019-10-31 | End: 2019-10-31

## 2019-10-31 RX ORDER — MORPHINE SULFATE 15 MG/1
15 TABLET ORAL EVERY 6 HOURS PRN
Qty: 40 TABLET | Refills: 0 | Status: SHIPPED | OUTPATIENT
Start: 2019-10-31 | End: 2019-10-31 | Stop reason: SDUPTHER

## 2019-10-31 RX ORDER — BUTALBITAL, ACETAMINOPHEN AND CAFFEINE 50; 325; 40 MG/1; MG/1; MG/1
1 CAPSULE ORAL 3 TIMES DAILY
Qty: 30 CAPSULE | Refills: 0 | Status: SHIPPED | OUTPATIENT
Start: 2019-10-31 | End: 2019-11-29

## 2019-10-31 RX ORDER — TRAMADOL HYDROCHLORIDE 50 MG/1
50 TABLET ORAL EVERY 6 HOURS PRN
Qty: 50 TABLET | Refills: 0 | Status: SHIPPED | OUTPATIENT
Start: 2019-10-31 | End: 2019-11-29

## 2019-10-31 RX ORDER — MORPHINE SULFATE 15 MG/1
15 TABLET ORAL EVERY 6 HOURS PRN
Qty: 40 TABLET | Refills: 0 | Status: SHIPPED | OUTPATIENT
Start: 2019-10-31 | End: 2020-03-31

## 2019-10-31 RX ORDER — LETROZOLE 2.5 MG/1
2.5 TABLET, FILM COATED ORAL DAILY
Qty: 30 TABLET | Refills: 11 | Status: SHIPPED | OUTPATIENT
Start: 2019-10-31 | End: 2020-05-29 | Stop reason: SDUPTHER

## 2019-10-31 RX ADMIN — DIPHENHYDRAMINE HYDROCHLORIDE 12.5 MG: 50 INJECTION, SOLUTION INTRAMUSCULAR; INTRAVENOUS at 05:10

## 2019-10-31 RX ADMIN — METOCLOPRAMIDE 10 MG: 5 INJECTION, SOLUTION INTRAMUSCULAR; INTRAVENOUS at 05:10

## 2019-10-31 RX ADMIN — CLONIDINE HYDROCHLORIDE 0.1 MG: 0.1 TABLET ORAL at 01:10

## 2019-10-31 RX ADMIN — OXYCODONE HYDROCHLORIDE AND ACETAMINOPHEN 1 TABLET: 10; 325 TABLET ORAL at 03:10

## 2019-10-31 RX ADMIN — HYDRALAZINE HYDROCHLORIDE 10 MG: 20 INJECTION INTRAMUSCULAR; INTRAVENOUS at 05:10

## 2019-10-31 NOTE — ED PROVIDER NOTES
SCRIBE #1 NOTE: I, Sujit Valerio, am scribing for, and in the presence of, Ernesto Phan NP. I have scribed the HPI, ROS, and PEx.     SCRIBE #2 NOTE: I, Darcie Wells, am scribing for, and in the presence of,  Gregorio Muhammad MD. I have scribed the remaining portions of the note not scribed by Scribe #1.      History     Chief Complaint   Patient presents with    Hypertension     Patient sent from the [facility] for elevated blood pressure (220/110). +HA, blurred vision. patient states she is compliant with bood pressure medication. patient was given a clonidine PTA.     Review of patient's allergies indicates:   Allergen Reactions    Pcn [penicillins] Rash         History of Present Illness     HPI    10/31/2019, 3:17 PM   History obtained from the patient      History of Present Illness: Susu Luther is a 69 y.o. female patient with a PMHx of Stroke, HTN, and breast CA who presents to the Emergency Department for evaluation of high blood pressure which onset just PTA. Pt was sent from clinic with a high blood pressure of 220/110. Pt was given a clonidine just PTA. Symptoms are constant and moderate in severity. No mitigating or exacerbating factors reported. Associated sxs include HA and blurry vision. Pt states HA has persisted for the past 2 days. Patient denies any fever, SOB, CP, abd pain, N/V, back pain, dizziness, weakness, numbness, speech difficulty, facial asymmetry, and all other sxs at this time. Prior Tx includes clonidine PTA. No further complaints or concerns at this time.        Arrival mode: Personal vehicle     PCP: Kennedy Rojas MD        Past Medical History:  Past Medical History:   Diagnosis Date    Encounter for blood transfusion     Hypertension     Malignant neoplasm of upper-outer quadrant of right breast in female, estrogen receptor positive 3/14/2019    radiation    Stroke 2009    no residual defect       Past Surgical History:  Past Surgical History:   Procedure  Laterality Date    APPENDECTOMY      BREAST BIOPSY      2019    BREAST LUMPECTOMY      2019     SECTION      x 1    OOPHORECTOMY      right     SENTINEL LYMPH NODE BIOPSY Right 3/27/2019    Procedure: BIOPSY, LYMPH NODE, SENTINEL;  Surgeon: Artemio Starks MD;  Location: [facility];  Service: General;  Laterality: Right;         Family History:  Family History   Problem Relation Age of Onset    Diabetes Maternal Grandmother     Diabetes Maternal Grandfather     Diabetes Mother     Breast cancer Neg Hx     Colon cancer Neg Hx     Ovarian cancer Neg Hx        Social History:  Social History     Tobacco Use    Smoking status: Never Smoker    Smokeless tobacco: Never Used   Substance and Sexual Activity    Alcohol use: No    Drug use: No    Sexual activity: Yes     Partners: Male     Birth control/protection: Post-menopausal        Review of Systems     Review of Systems   Constitutional: Negative for fever.   HENT: Negative for sore throat.    Eyes: Positive for visual disturbance.   Respiratory: Negative for shortness of breath.    Cardiovascular: Negative for chest pain.   Gastrointestinal: Negative for abdominal pain, nausea and vomiting.   Genitourinary: Negative for dysuria.   Musculoskeletal: Negative for back pain.   Skin: Negative for rash.   Neurological: Positive for headaches. Negative for dizziness, facial asymmetry, speech difficulty, weakness and numbness.   Hematological: Does not bruise/bleed easily.   All other systems reviewed and are negative.       Physical Exam     Initial Vitals [10/31/19 1453]   BP Pulse Resp Temp SpO2   (!) 180/103 (!) 53 16 97.5 °F (36.4 °C) 98 %      MAP       --          Physical Exam  Nursing Notes and Vital Signs Reviewed.  Constitutional: Patient is in no acute distress. Well-developed and well-nourished.  Head: Atraumatic. Normocephalic.  Eyes: PERRL. EOM intact. Conjunctivae are not pale. No scleral icterus.  ENT: Mucous membranes are moist.  "Oropharynx is clear and symmetric.    Neck: Supple. Full ROM. No lymphadenopathy.  Cardiovascular: Regular rate. Regular rhythm. No murmurs, rubs, or gallops. Distal pulses are 2+ and symmetric.  Pulmonary/Chest: No respiratory distress. Clear to auscultation bilaterally. No wheezing or rales.  Abdominal: Soft and non-distended.  There is no tenderness.  No rebound, guarding, or rigidity.   Musculoskeletal: Moves all extremities. No obvious deformities. No edema. No calf tenderness.  Skin: Warm and dry.  Neurological: Patient is alert and oriented to person, place and time. Pupils ERRL and EOM normal. Cranial nerves II-XII are intact. Strength is full bilaterally; it is equal and 5/5 in bilateral upper and lower extremities. There is no pronator drift of outstretched arms. Light touch sense is intact. Speech is clear and normal. No acute focal neurological deficits noted.   Psychiatric: Normal affect. Good eye contact. Appropriate in content.     ED Course   Procedures  ED Vital Signs:  Vitals:    10/31/19 1453 10/31/19 1627 10/31/19 1701 10/31/19 1719   BP: (!) 180/103 (!) 199/93 (!) 217/100 (!) 230/102   Pulse: (!) 53 60 (!) 46 (!) 47   Resp: 16 18 17 18   Temp: 97.5 °F (36.4 °C)      TempSrc: Oral      SpO2: 98% 98% 100% 97%   Weight: (!) 143.8 kg (317 lb)      Height: 5' 9" (1.753 m)       10/31/19 1732   BP: (!) 187/86   Pulse: (!) 53   Resp:    Temp:    TempSrc:    SpO2: 99%   Weight:    Height:        Abnormal Lab Results:  Labs Reviewed - No data to display     All Lab Results:  Results for orders placed or performed in visit on 10/31/19   CBC auto differential   Result Value Ref Range    WBC 6.45 3.90 - 12.70 K/uL    RBC 4.67 4.00 - 5.40 M/uL    Hemoglobin 11.1 (L) 12.0 - 16.0 g/dL    Hematocrit 36.8 (L) 37.0 - 48.5 %    Mean Corpuscular Volume 79 (L) 82 - 98 fL    Mean Corpuscular Hemoglobin 23.8 (L) 27.0 - 31.0 pg    Mean Corpuscular Hemoglobin Conc 30.2 (L) 32.0 - 36.0 g/dL    RDW 17.5 (H) 11.5 - 14.5 % "    Platelets 232 150 - 350 K/uL    MPV 9.3 9.2 - 12.9 fL    Immature Granulocytes 0.3 0.0 - 0.5 %    Gran # (ANC) 4.1 1.8 - 7.7 K/uL    Immature Grans (Abs) 0.02 0.00 - 0.04 K/uL    Lymph # 1.5 1.0 - 4.8 K/uL    Mono # 0.6 0.3 - 1.0 K/uL    Eos # 0.3 0.0 - 0.5 K/uL    Baso # 0.06 0.00 - 0.20 K/uL    nRBC 0 0 /100 WBC    Gran% 63.0 38.0 - 73.0 %    Lymph% 22.5 18.0 - 48.0 %    Mono% 9.1 4.0 - 15.0 %    Eosinophil% 4.5 0.0 - 8.0 %    Basophil% 0.9 0.0 - 1.9 %    Differential Method Automated    CMP   Result Value Ref Range    Sodium 144 136 - 145 mmol/L    Potassium 4.2 3.5 - 5.1 mmol/L    Chloride 108 95 - 110 mmol/L    CO2 29 23 - 29 mmol/L    Glucose 96 70 - 110 mg/dL    BUN, Bld 17 8 - 23 mg/dL    Creatinine 1.4 0.5 - 1.4 mg/dL    Calcium 9.8 8.7 - 10.5 mg/dL    Total Protein 7.4 6.0 - 8.4 g/dL    Albumin 3.2 (L) 3.5 - 5.2 g/dL    Total Bilirubin 0.3 0.1 - 1.0 mg/dL    Alkaline Phosphatase 113 55 - 135 U/L    AST 12 10 - 40 U/L    ALT 12 10 - 44 U/L    Anion Gap 7 (L) 8 - 16 mmol/L    eGFR if African American 45 (A) >60 mL/min/1.73 m^2    eGFR if non African American 39 (A) >60 mL/min/1.73 m^2         Imaging Results:  Imaging Results          CT Head Without Contrast (Final result)  Result time 10/31/19 16:22:24    Final result by Vishal Hammond Jr., MD (10/31/19 16:22:24)                 Impression:      No acute or significant CT abnormality.    All CT scans at this facility use dose modulation, iterative reconstruction, and/or weight base dosing when appropriate to reduce radiation dose to as low as reasonably achievable.      Electronically signed by: Vishal Hammond Jr., MD  Date:    10/31/2019  Time:    16:22             Narrative:    EXAMINATION:  CT HEAD WITHOUT CONTRAST    CLINICAL HISTORY:  Headache, acute, norm neuro exam;    TECHNIQUE:  Contiguous axial images were obtained from the skull base through the vertex without intravenous contrast.    COMPARISON:  None    FINDINGS:  No intracranial hemorrhage.  No mass effect or midline shift. Normal parenchymal attenuation. The ventricles and sulci are normal in size and configuration. The paranasal sinuses and mastoid air cells are clear.  No concerning osseous findings.                                 The EKG was ordered, reviewed, and independently interpreted by the ED provider.  Interpretation time: 1523  Rate: 47 BPM  Rhythm: sinus bradycardia  Interpretation: Left axis deviation. Voltage criteria for left ventricular hypertrophy. No specific T wave abnormality. No STEMI.           The Emergency Provider reviewed the vital signs and test results, which are outlined above.     ED Discussion     4:37 PM: Re-evaluated pt. Pt's repeat blood pressure was 199/100.  Pt states she still has a HA.  D/w pt all pertinent results. D/w pt any concerns expressed at this time. Answered all questions. Pt expresses understanding at this time.     4:47 PM: Ernesto Phan NP transfers care of pt to Dr. Muhammad pending lab results.     5:01 PM: Re-evaluated pt. Pt is resting comfortably and is in no acute distress.  Pt states that she has been out of her Fiorcet to treat her headaches for the past 2 weeks. Pt denies missing any scheduled home BP medications. Pt reports that her  is able to bring her home. D/w pt all pertinent results. D/w pt any concerns expressed at this time. Answered all questions. Pt expresses understanding at this time.    5:42 PM: Reassessed pt at this time. Pt's BP has improved  Pt states her condition has improved at this time. Discussed with pt all pertinent ED information and results. Discussed pt dx and plan of tx. Gave pt all f/u and return to the ED instructions. All questions and concerns were addressed at this time. Pt expresses understanding of information and instructions, and is comfortable with plan to discharge. Pt is stable for discharge.    I discussed with patient and/or family/caretaker that evaluation in the ED does not suggest any  emergent or life threatening medical conditions requiring immediate intervention beyond what was provided in the ED, and I believe patient is safe for discharge.  Regardless, an unremarkable evaluation in the ED does not preclude the development or presence of a serious of life threatening condition. As such, patient was instructed to return immediately for any worsening or change in current symptoms.      ED Medication(s):  Medications   oxyCODONE-acetaminophen  mg per tablet 1 tablet (1 tablet Oral Given 10/31/19 1529)   hydrALAZINE injection 10 mg (10 mg Intravenous Given 10/31/19 1719)   metoclopramide HCl injection 10 mg (10 mg Intravenous Given 10/31/19 1720)   diphenhydrAMINE injection 12.5 mg (12.5 mg Intravenous Given 10/31/19 1728)       New Prescriptions    No medications on file       Follow-up Information     Call your primary care physician tomorrow to schedule a follow-up appointment as soon as possible.           [facility]    Specialty:  Emergency Medicine  Why:  As needed, If symptoms worsen  Contact information:  [address] [facility] [address] [phone]                       Medical Decision Making:   Clinical Tests:   Lab Tests: Ordered and Reviewed  Radiological Study: Ordered and Reviewed  Medical Tests: Ordered and Reviewed  A patient with a known history of hypertension and headaches who takes antihypertensives and Fioricet presents to the emergency department with both hypertension and a headache; she has been taking her hypertension medications as prescribed, but has been out of her Fioricet for the past 2 weeks; hypertension and headache treated here in the emergency department; labs and CT images unremarkable; blood pressure as high as 230 systolic; discharged with BP of 180s/80s; advised color primary care physician tomorrow to arrange a follow-up appointment for continued long-term management of hypertension; chart review shows  that she is on 12.5 mg of hydrochlorothiazide; patient informed that if her blood pressure is elevated at home tomorrow before she can follow up with her primary care physician for long-term recommendations, I informed her that she could take a 2nd 12.5 mg of hydrochlorothiazide until she can follow up with her primary care physician for definitive long-term treatment recommendations; ER return precautions provided             Scribe Attestation:   Scribe #1: I performed the above scribed service and the documentation accurately describes the services I performed. I attest to the accuracy of the note.     Attending:   Physician Attestation Statement for Scribe #1: I, Ernesto Phan NP, personally performed the services described in this documentation, as scribed by Sujit Valerio, in my presence, and it is both accurate and complete.       Scribe Attestation:   Scribe #2: I performed the above scribed service and the documentation accurately describes the services I performed. I attest to the accuracy of the note.    Attending Attestation:           Physician Attestation for Scribe:    Physician Attestation Statement for Scribe #2: I, Gregorio Muhammad MD, reviewed documentation, as scribed by Darcie Wells in my presence, and it is both accurate and complete. I also acknowledge and confirm the content of the note done by Scribe #1.           Clinical Impression       ICD-10-CM ICD-9-CM   1. Essential hypertension I10 401.9   2. Weakness R53.1 780.79   3. Nonintractable headache, unspecified chronicity pattern, unspecified headache type R51 784.0   4. Hypertensive urgency I16.0 401.9       Disposition:   Disposition: Discharged  Condition: Stable         Gregorio Muhammad MD  10/31/19 4495

## 2019-10-31 NOTE — PROGRESS NOTES
Subjective:       Patient ID: Susu Luther is a 69 y.o. female.    Chief Complaint: Hypertension    HPI    Pt sent for eval from hem/onc for elevated BP, dizziness, and headache,SOB on exertion  She took hctz and clonidine this am  Has not checked her BP at home x 1 week  She denies cp  She does feel anxious  She takes valium for anxiety, last taken last night  She reports breast pain 10/10- pain meds were sent to pharmacy per hem/onc today      Past Medical History:   Diagnosis Date    Encounter for blood transfusion     Hypertension     Malignant neoplasm of upper-outer quadrant of right breast in female, estrogen receptor positive 3/14/2019    radiation    Stroke 2009    no residual defect     Past Surgical History:   Procedure Laterality Date    APPENDECTOMY      BREAST BIOPSY          BREAST LUMPECTOMY      2019     SECTION      x 1    OOPHORECTOMY      right     SENTINEL LYMPH NODE BIOPSY Right 3/27/2019    Procedure: BIOPSY, LYMPH NODE, SENTINEL;  Surgeon: Artemio Starks MD;  Location: [facility];  Service: General;  Laterality: Right;     Social History     Socioeconomic History    Marital status:      Spouse name: Not on file    Number of children: Not on file    Years of education: Not on file    Highest education level: Not on file   Occupational History    Not on file   Social Needs    Financial resource strain: Not on file    Food insecurity:     Worry: Not on file     Inability: Not on file    Transportation needs:     Medical: Not on file     Non-medical: Not on file   Tobacco Use    Smoking status: Never Smoker    Smokeless tobacco: Never Used   Substance and Sexual Activity    Alcohol use: No    Drug use: No    Sexual activity: Yes     Partners: Male     Birth control/protection: Post-menopausal   Lifestyle    Physical activity:     Days per week: Not on file     Minutes per session: Not on file    Stress: Not on file   Relationships    Social  connections:     Talks on phone: Not on file     Gets together: Not on file     Attends Hinduism service: Not on file     Active member of club or organization: Not on file     Attends meetings of clubs or organizations: Not on file     Relationship status: Not on file   Other Topics Concern    Not on file   Social History Narrative    Not on file     Review of patient's allergies indicates:   Allergen Reactions    Pcn [penicillins] Rash     No current facility-administered medications for this visit.      Current Outpatient Medications   Medication Sig    albuterol (VENTOLIN HFA) 90 mcg/actuation inhaler Inhale 2 puffs into the lungs every 4 (four) hours as needed for Shortness of Breath.    aspirin (ECOTRIN) 81 MG EC tablet Take 1 tablet (81 mg total) by mouth once daily.    atorvastatin (LIPITOR) 40 MG tablet Take 1 tablet (40 mg total) by mouth once daily.    blood pressure kit med and lrg Kit Take blood pressure first thing in the morning.    butalbital-acetaminophen-caff -40 mg -40 mg Cap Take 1 tablet by mouth 3 (three) times daily.    cholecalciferol, vitamin D3, 50,000 unit capsule Take 50,000 Units by mouth once a week.     cloNIDine (CATAPRES) 0.1 MG tablet Take 1 tablet (0.1 mg total) by mouth 3 (three) times daily.    diazePAM (VALIUM) 10 MG Tab Take 1 tablet (10 mg total) by mouth 2 (two) times daily.    diclofenac (VOLTAREN) 75 MG EC tablet Take 1 tablet (75 mg total) by mouth 2 (two) times daily.    emollient combination no.63 (MIADERM) Lotn Apply 1 application topically 3 (three) times daily.    furosemide (LASIX) 20 MG tablet take 1/2 tablet by mouth a day.    gabapentin (NEURONTIN) 100 MG capsule Take 1 capsule (100 mg total) by mouth 3 (three) times daily.    hydroCHLOROthiazide (MICROZIDE) 12.5 mg capsule Take 1 capsule (12.5 mg total) by mouth once daily.    inhalation spacing device (COMPACT SPACE CHAMBER) Use as directed for inhalation.    letrozole (FEMARA) 2.5  mg Tab Take 1 tablet (2.5 mg total) by mouth once daily.    lidocaine (LIDODERM) 5 % Place 1 patch onto the skin daily as needed. Remove & Discard patch within 12 hours or as directed by MD    lidocaine HCl-menthol 4-1 % PtMd Apply 1 patch topically daily as needed.    losartan (COZAAR) 100 MG tablet Take 100 mg by mouth once daily.    losartan (COZAAR) 50 MG tablet Take 1 tablet (50 mg total) by mouth once daily.    morphine (MSIR) 15 MG tablet Take 1 tablet (15 mg total) by mouth every 6 (six) hours as needed for Pain.    naproxen (NAPROSYN) 500 MG tablet Take 1 tablet (500 mg total) by mouth 2 (two) times daily with meals.    NARCAN 4 mg/actuation Alva CALL 911. ADMINISTER A SINGLE SPRAY INTRANASALLY INTO ONE NOSTRIL UPON SIGNS OF OPIOID OVERDOSE. MAY REPEAT AFTER 3 MINUTES IF NO RESPONSE.    OLANZapine (ZYPREXA) 10 MG tablet Take 10 mg by mouth nightly.    ondansetron (ZOFRAN-ODT) 8 MG TbDL Take 1 tablet (8 mg total) by mouth every 8 (eight) hours as needed (nausea).    prochlorperazine (COMPAZINE) 10 MG tablet Take 1 tablet (10 mg total) by mouth 4 (four) times daily as needed (nausea).    propranolol (INDERAL) 40 MG tablet Take 1 tablet (40 mg total) by mouth 2 (two) times daily.    traMADol (ULTRAM) 50 mg tablet Take 1 tablet (50 mg total) by mouth every 6 (six) hours as needed for Pain.    venlafaxine (EFFEXOR-XR) 75 MG 24 hr capsule Take 1 capsule (75 mg total) by mouth once daily.           Review of Systems   Constitutional: Negative for activity change, appetite change, chills, diaphoresis, fatigue, fever and unexpected weight change.   HENT: Negative for congestion, ear pain, postnasal drip, rhinorrhea, sinus pressure, sinus pain, sneezing, sore throat, tinnitus, trouble swallowing and voice change.    Eyes: Negative for photophobia, pain and visual disturbance.   Respiratory: Positive for shortness of breath. Negative for cough, chest tightness and wheezing.    Cardiovascular: Negative for  chest pain, palpitations and leg swelling.   Gastrointestinal: Negative for abdominal distention, abdominal pain, constipation, diarrhea, nausea and vomiting.   Genitourinary: Negative for decreased urine volume, difficulty urinating, dysuria, flank pain, frequency, hematuria and urgency.   Musculoskeletal: Negative for arthralgias, back pain, joint swelling, neck pain and neck stiffness.        R breast pain (saw hem/onc today)   Allergic/Immunologic: Negative for immunocompromised state.   Neurological: Positive for dizziness and headaches. Negative for tremors, seizures, syncope, facial asymmetry, speech difficulty, weakness, light-headedness and numbness.   Hematological: Negative for adenopathy. Does not bruise/bleed easily.   Psychiatric/Behavioral: Negative for agitation, behavioral problems, confusion, decreased concentration, dysphoric mood, hallucinations, self-injury, sleep disturbance and suicidal ideas. The patient is nervous/anxious. The patient is not hyperactive.        Objective:      Physical Exam   Constitutional: She is oriented to person, place, and time.   Cardiovascular: Normal rate, regular rhythm and normal heart sounds.   Pulmonary/Chest: Effort normal and breath sounds normal.   Musculoskeletal: Normal range of motion.   Neurological: She is alert and oriented to person, place, and time.   Skin: Skin is warm and dry.   Psychiatric: Her behavior is normal. Judgment and thought content normal. Her mood appears anxious. Her speech is rapid and/or pressured. Cognition and memory are normal.       Assessment:     Vitals:    10/31/19 1317   BP: (!) 220/110   Pulse:    Temp:          1. Hypertensive urgency    2. Essential hypertension    3. Anxiety    4. Nonintractable headache, unspecified chronicity pattern, unspecified headache type    5. History of stroke    6. Chronic pain of breast    7. Morbid obesity with BMI of 45.0-49.9, adult        Plan:   Hypertensive urgency    Essential  hypertension    Anxiety    Nonintractable headache, unspecified chronicity pattern, unspecified headache type    History of stroke    Chronic pain of breast    Morbid obesity with BMI of 45.0-49.9, adult    Other orders  -     cloNIDine tablet 0.1 mg      -meds given as above- 214/104 post clonidine   -pt needs ER evaluation due to hypertensive emergency   -family member on the way to retrieve patient and bring her to ER

## 2019-10-31 NOTE — ED NOTES
Patient identifiers verified and correct for Susu Luther.    Pt reports she was seen at the [facility] today and her BP was elevated. Pt reports she has blurred vision and a headache. Reports she takes her BP medicine everyday.     LOC: The patient is awake, alert and aware of environment with an appropriate affect, the patient is oriented x 3 and speaking appropriately.  APPEARANCE: Patient resting comfortably and in no acute distress, patient is clean and well groomed, patient's clothing is properly fastened.  SKIN: The skin is warm and dry, color consistent with ethnicity, patient has normal skin turgor and moist mucus membranes, skin intact, no breakdown or bruising noted.  MUSCULOSKELETAL: Patient moving all extremities spontaneously.  RESPIRATORY: Airway is open and patent, respirations are spontaneous.  CARDIAC: Patient has a normal rate, no periphreal edema noted, capillary refill < 3 seconds.  ABDOMEN: Soft and non tender to palpation.

## 2019-10-31 NOTE — ED NOTES
Patient identifiers verified and correct for Susu Luther.    LOC: The patient is awake, alert and aware of environment with an appropriate affect, the patient is oriented x 3 and speaking appropriately.  APPEARANCE: Patient resting comfortably and in no acute distress, patient is clean and well groomed, patient's clothing is properly fastened.  SKIN: The skin is warm and dry, color consistent with ethnicity, patient has normal skin turgor and moist mucus membranes, skin intact, no breakdown or bruising noted.  MUSCULOSKELETAL: Patient moving all extremities spontaneously, no obvious swelling or deformities noted.  RESPIRATORY: Airway is open and patent, respirations are spontaneous, patient has a normal effort and rate, no accessory muscle use noted, bilateral breath sounds clear. Pt reports SOB  CARDIAC: Patient is sinus bradycardic rhythm, no periphreal edema noted, capillary refill < 3 seconds.  ABDOMEN: Soft and non tender to palpation, no distention noted, normoactive bowel sounds present in all four quadrants. Pt reports nausea  NEUROLOGIC: PERRL, **mm bilaterally, eyes open spontaneously, behavior appropriate to situation, follows commands, facial expression symmetrical, bilateral hand grasp equal and even, purposeful motor response noted, normal sensation in all extremities when touched with a finger. Pt reports HA

## 2019-10-31 NOTE — PROGRESS NOTES
Subjective:       Patient ID: Susu Luther is a 69 y.o. female.    Chief Complaint: Malignant neoplasm of upper-outer quadrant of right breast in female, estrogen receptor positive [C50.411, Z17.0]  HPI: We have an opportunity to see Ms. Susu Luther in Hematology Oncology clinic at [facility] on 10/31/2019.  Ms. Susu Luther is a 69 y.o. woman with pT2N0 invasive lobular breast cancer ER positive IN negative Her2 negative s/p lumpectomy and sentinel node biopsy.       Was found right breast mass on self breast exam.  Screening mammogram on 2/26/2019 showed Impression:  Right  Mass: Right breast mass at the lower outer middle position. Assessment: 0 - Incomplete. Special Views: Spot Compression View and Ultrasound are recommended.      Left  There is no mammographic evidence of malignancy.     On March 4, 2019, underwent US guided biopsy showed FINAL PATHOLOGIC DIAGNOSIS  1. BREAST, RIGHT, LOWER OUTER 08:00, ULTRASOUND-GUIDED BIOPSY:  Invasive lobular carcinoma of breast.  Size of invasive carcinoma: 19 mm in greatest linear dimension within a single core biopsy fragment.  Grethel Histologic Score: Grade 2 of 3.  Tubule formation: 3  Nuclear pleomorphism: 2  Mitoses: 1  Associated lobular carcinoma in situ (LCIS) is present.  No lymphovascular or perineural invasion.  Breast biomarkers are pending and will be included in the supplemental report.  2. AXILLA, RIGHT LYMPH NODES, ULTRASOUND-GUIDED BIOPSY:  Benign lymph node without evidence of metastatic carcinoma.  Immunohistochemical stains (Cam 5.2 and CK7) are confirmatory.     On March 27, 2019 underwent lumpectomy with sentinel node.  Pathology showed   PROCEDURE: Excision/lumpectomy  SPECIMEN LATERALITY: Right breast  TUMOR SITE: 8 o'clock  TUMOR SIZE: Approximately 4.0 x 3.0 x 3.0 cm  HISTOLOGIC TYPE: Invasive lobular carcinoma  HISTOLOGIC GRADE: Grade 2 of 3  Tubular differentiation: 3  Nuclear pleomorphism: 2  Mitotic rate:  1  TUMOR FOCALITY: Single focus of invasive carcinoma  DUCTAL CARCINOMA IN SITU: Not identified  LOBULAR CARCINOMA IN SITU: Present  MARGINS:  Main specimen (#6) : Carcinoma present at superior, deep and medial margins  Distance from inferior margin: 5 mm  Distance from lateral margin: greater than 20 mm  Distance of anterior margin: 15 mm  Additionally submitted margins (specimen #7): Distance from newly designated margin: 4 mm  REGIONAL LYMPH NODES: Uninvolved tumor cells  Number of total lymph nodes examined: 8  Number of sentinel nodes examined: 4  TREATMENT EFFECT: No known presurgical therapy  LYMPH-VASCULAR INVASION: Not identified  ANCILLARY STUDIES (performed on patient's previous biopsy MS ):  ER: Positive (95% of tumor cells)  MT: Negative (less than 1 % of tumor cells)  Her2 by FISH: Negative (not amplified)  Ki-67%: Positive (70 % of tumor cells)  ADDITIONAL FINDINGS: Apocrine metaplasia, usual ductal hyperplasia, fibroadenomatoid change  PATHOLOGIC STAGE CLASSIFICATION: pT2 pN0     Oncotype type DX recurrence score 22, with distant recurrence risk 8%, absolute chemotherapy benefit <1%.     Completed adjuvant radiation.  Has right breast tenderness.  Started on anastrozole.  Has arthralgias. Could not tolerate.  AI was changed to letrozole, tolerating well.                    Has hot flashes and depression.  Cymbalta was started but had nausea diarrhea, could not tolerate. Currently on Effexor.         Malignant neoplasm of upper-outer quadrant of right breast in female, estrogen receptor positive    3/14/2019 Initial Diagnosis     Malignant neoplasm of upper-outer quadrant of right breast in female, estrogen receptor positive      3/27/2019 Cancer Staged     Staging form: Breast, AJCC 8th Edition  - Pathologic stage from 3/27/2019: Stage IIA (pT2, pN0(sn), cM0, G2, ER+, MT-, HER2-) - Signed by Guido Huynh MD on 4/4/2019 5/20/2019 - 6/18/2019 Radiation Therapy     Treatment Site Ref. ID  Energy Dose/Fx (Gy) #Fx Dose Correction (Gy) Total Dose (Gy) Start Date End Date Elapsed Days   Boost Boost 18X 2.5 4 / 4 0 10 6/13/2019 6/18/2019 5   RtBreastAddMV Rt Breast 18X/6X 2.66 16 / 16 0 42.56 5/20/2019 6/12/2019 23            Past Medical History:   Diagnosis Date    Encounter for blood transfusion     Hypertension     Malignant neoplasm of upper-outer quadrant of right breast in female, estrogen receptor positive 3/14/2019    radiation    Stroke 2009    no residual defect     Family History   Problem Relation Age of Onset    Diabetes Maternal Grandmother     Diabetes Maternal Grandfather     Diabetes Mother     Breast cancer Neg Hx     Colon cancer Neg Hx     Ovarian cancer Neg Hx      Social History     Socioeconomic History    Marital status:      Spouse name: Not on file    Number of children: Not on file    Years of education: Not on file    Highest education level: Not on file   Occupational History    Not on file   Social Needs    Financial resource strain: Not on file    Food insecurity:     Worry: Not on file     Inability: Not on file    Transportation needs:     Medical: Not on file     Non-medical: Not on file   Tobacco Use    Smoking status: Never Smoker    Smokeless tobacco: Never Used   Substance and Sexual Activity    Alcohol use: No    Drug use: No    Sexual activity: Yes     Partners: Male     Birth control/protection: Post-menopausal   Lifestyle    Physical activity:     Days per week: Not on file     Minutes per session: Not on file    Stress: Not on file   Relationships    Social connections:     Talks on phone: Not on file     Gets together: Not on file     Attends Bahai service: Not on file     Active member of club or organization: Not on file     Attends meetings of clubs or organizations: Not on file     Relationship status: Not on file   Other Topics Concern    Not on file   Social History Narrative    Not on file     Past Surgical History:    Procedure Laterality Date    APPENDECTOMY      BREAST BIOPSY          BREAST LUMPECTOMY      2019     SECTION      x 1    OOPHORECTOMY      right     SENTINEL LYMPH NODE BIOPSY Right 3/27/2019    Procedure: BIOPSY, LYMPH NODE, SENTINEL;  Surgeon: Artemio Starks MD;  Location[facility];  Service: General;  Laterality: Right;     Current Outpatient Medications   Medication Sig Dispense Refill    albuterol (VENTOLIN HFA) 90 mcg/actuation inhaler Inhale 2 puffs into the lungs every 4 (four) hours as needed for Shortness of Breath. 18 g 11    aspirin (ECOTRIN) 81 MG EC tablet Take 1 tablet (81 mg total) by mouth once daily.  0    atorvastatin (LIPITOR) 40 MG tablet Take 1 tablet (40 mg total) by mouth once daily. 90 tablet 3    blood pressure kit med and lrg Kit Take blood pressure first thing in the morning. 1 each 0    butalbital-acetaminophen-caff -40 mg -40 mg Cap Take 1 tablet by mouth 3 (three) times daily. 30 capsule 0    cholecalciferol, vitamin D3, 50,000 unit capsule Take 50,000 Units by mouth once a week.   0    cloNIDine (CATAPRES) 0.1 MG tablet Take 1 tablet (0.1 mg total) by mouth 3 (three) times daily. 270 tablet 2    diazePAM (VALIUM) 10 MG Tab Take 1 tablet (10 mg total) by mouth 2 (two) times daily. 30 tablet 1    diclofenac (VOLTAREN) 75 MG EC tablet Take 1 tablet (75 mg total) by mouth 2 (two) times daily. 60 tablet 0    emollient combination no.63 (MIADERM) Lotn Apply 1 application topically 3 (three) times daily.      furosemide (LASIX) 20 MG tablet take 1/2 tablet by mouth a day. 30 tablet 2    gabapentin (NEURONTIN) 100 MG capsule Take 1 capsule (100 mg total) by mouth 3 (three) times daily. 90 capsule 0    gabapentin (NEURONTIN) 400 MG capsule Take 1 capsule (400 mg total) by mouth 3 (three) times daily. 90 capsule 11    hydroCHLOROthiazide (MICROZIDE) 12.5 mg capsule Take 1 capsule (12.5 mg total) by mouth once daily. 90 capsule 1    inhalation  spacing device (COMPACT SPACE CHAMBER) Use as directed for inhalation. 1 Device 0    lidocaine (LIDODERM) 5 % Place 1 patch onto the skin daily as needed. Remove & Discard patch within 12 hours or as directed by MD 30 patch 0    lidocaine HCl-menthol 4-1 % PtMd Apply 1 patch topically daily as needed. 30 patch 6    losartan (COZAAR) 100 MG tablet Take 100 mg by mouth once daily.  3    losartan (COZAAR) 50 MG tablet Take 1 tablet (50 mg total) by mouth once daily. 90 tablet 3    morphine (MSIR) 15 MG tablet Take 1 tablet (15 mg total) by mouth every 6 (six) hours as needed for Pain. 40 tablet 0    naproxen (NAPROSYN) 500 MG tablet Take 1 tablet (500 mg total) by mouth 2 (two) times daily with meals. 60 tablet 0    NARCAN 4 mg/actuation [place] CALL 911. ADMINISTER A SINGLE SPRAY INTRANASALLY INTO ONE NOSTRIL UPON SIGNS OF OPIOID OVERDOSE. MAY REPEAT AFTER 3 MINUTES IF NO RESPONSE.  0    OLANZapine (ZYPREXA) 10 MG tablet Take 10 mg by mouth nightly.      ondansetron (ZOFRAN-ODT) 8 MG TbDL Take 1 tablet (8 mg total) by mouth every 8 (eight) hours as needed (nausea). 60 tablet 1    prochlorperazine (COMPAZINE) 10 MG tablet Take 1 tablet (10 mg total) by mouth 4 (four) times daily as needed (nausea). 60 tablet 1    propranolol (INDERAL) 40 MG tablet Take 1 tablet (40 mg total) by mouth 2 (two) times daily. 60 tablet 11    traMADol (ULTRAM) 50 mg tablet Take 1 tablet (50 mg total) by mouth every 6 (six) hours as needed for Pain. 50 tablet 0    venlafaxine (EFFEXOR XR) 37.5 MG 24 hr capsule Take 1 capsule (37.5 mg total) by mouth once daily. 30 capsule 2     No current facility-administered medications for this visit.        Labs:  Lab Results   Component Value Date    WBC 6.45 10/31/2019    HGB 11.1 (L) 10/31/2019    HCT 36.8 (L) 10/31/2019    MCV 79 (L) 10/31/2019     10/31/2019     BMP  Lab Results   Component Value Date     10/31/2019    K 4.2 10/31/2019     10/31/2019    CO2 29 10/31/2019     BUN 17 10/31/2019    CREATININE 1.4 10/31/2019    CALCIUM 9.8 10/31/2019    ANIONGAP 7 (L) 10/31/2019    ESTGFRAFRICA 45 (A) 10/31/2019    EGFRNONAA 39 (A) 10/31/2019     Lab Results   Component Value Date    ALT 12 10/31/2019    AST 12 10/31/2019    ALKPHOS 113 10/31/2019    BILITOT 0.3 10/31/2019       No results found for: IRON, TIBC, FERRITIN, SATURATEDIRO  No results found for: ZEHXITIA27  No results found for: FOLATE  Lab Results   Component Value Date    TSH 0.799 05/18/2019       I have reviewed the radiology reports and examined the scan/xray images.    Review of Systems   Constitutional: Negative.    HENT: Negative.    Eyes: Negative.    Respiratory: Negative.    Cardiovascular: Negative.    Gastrointestinal: Negative.    Endocrine: Negative.    Genitourinary: Negative.    Musculoskeletal: Negative.    Skin: Negative.    Allergic/Immunologic: Negative.    Neurological: Negative.    Hematological: Negative.    Psychiatric/Behavioral: Negative.      ECOG SCORE    0 - Fully active-able to carry on all pre-disease performance without restriction            Objective:   There were no vitals filed for this visit.There is no height or weight on file to calculate BMI.  Physical Exam   Constitutional: She is oriented to person, place, and time. She appears well-developed and well-nourished.   HENT:   Head: Normocephalic and atraumatic.   Eyes: Conjunctivae and EOM are normal.   Neck: Normal range of motion. Neck supple.   Cardiovascular: Normal rate and regular rhythm.   Pulmonary/Chest: Effort normal and breath sounds normal.   Abdominal: Soft. Bowel sounds are normal.   Musculoskeletal: Normal range of motion.   Neurological: She is alert and oriented to person, place, and time.   Skin: Skin is warm and dry.   Psychiatric: She has a normal mood and affect. Her behavior is normal. Judgment and thought content normal.   Nursing note and vitals reviewed.        Assessment:      1. Malignant neoplasm of  upper-outer quadrant of right breast in female, estrogen receptor positive    2. Malignant neoplasm of upper-outer quadrant of right breast in female, estrogen receptor positive    3. Depression, unspecified depression type    4. Essential hypertension    5. Chronic pain of breast           Plan:     Malignant neoplasm of upper-outer quadrant of right breast in female, estrogen receptor positive  -    -     letrozole (FEMARA) 2.5 mg Tab; Take 1 tablet (2.5 mg total) by mouth once daily.  Dispense: 30 tablet; Refill: 11    -     CBC auto differential; Future; Expected date: 11/30/2019  -     Comprehensive metabolic panel; Future; Expected date: 11/30/2019  -     Ferritin; Future; Expected date: 11/30/2019  -     Folate; Future; Expected date: 11/30/2019  -     Iron and TIBC; Future; Expected date: 11/30/2019  -     Vitamin B12; Future; Expected date: 11/30/2019  -     TSH; Future; Expected date: 11/30/2019    Headaches  -     butalbital-acetaminophen-caff -40 mg -40 mg Cap; Take 1 tablet by mouth 3 (three) times daily.  Dispense: 30 capsule; Refill: 0      Pain at breast  -     Ambulatory consult to Pain Clinic for pain at breast surgery and radiation site, ? Neuroma.  -     gabapentin (NEURONTIN) 100 MG capsule; Take 1 capsule (100 mg total) by mouth 3 (three) times daily.  Dispense: 90 capsule; Refill: 2  -     traMADol (ULTRAM) 50 mg tablet; Take 1 tablet (50 mg total) by mouth every 6 (six) hours as needed for Pain.  Dispense: 50 tablet; Refill: 0  -     morphine (MSIR) 15 MG tablet; Take 1 tablet (15 mg total) by mouth every 6 (six) hours as needed for Pain.  Dispense: 40 tablet; Refill: 0  Depression, unspecified depression type    -     venlafaxine (EFFEXOR-XR) 75 MG 24 hr capsule; Take 1 capsule (75 mg total) by mouth once daily.  Dispense: 30 capsule; Refill: 2    Essential hypertension  -     Ambulatory referral to Internal Medicine to see Dr. Rojas in the next few days.

## 2019-11-01 ENCOUNTER — TELEPHONE (OUTPATIENT)
Dept: PHARMACY | Facility: CLINIC | Age: 69
End: 2019-11-01

## 2019-11-01 NOTE — TELEPHONE ENCOUNTER
Patient confirmed that she will continue on letrozole. She has enough until next Tues (11/5). Medication will ship on 11/4 for patient to receive on 11/5. Address confirmed. $0.00. Medication list reviewed. No new allergies or health conditions.

## 2019-11-04 ENCOUNTER — TELEPHONE (OUTPATIENT)
Dept: INTERNAL MEDICINE | Facility: CLINIC | Age: 69
End: 2019-11-04

## 2019-11-04 NOTE — TELEPHONE ENCOUNTER
----- Message from Mariah Kc sent at 11/4/2019 11:16 AM CST -----  Contact: self  Requesting call back regarding informing provider that pt has been taking medication that was prescribed but blood pressure is still being elevated. Right now its 173/110. Please call back at [phone]      Thanks,  Mariah Kc

## 2019-11-11 ENCOUNTER — LAB VISIT (OUTPATIENT)
Dept: LAB | Facility: HOSPITAL | Age: 69
End: 2019-11-11
Attending: PHYSICIAN ASSISTANT
Payer: COMMERCIAL

## 2019-11-11 ENCOUNTER — OFFICE VISIT (OUTPATIENT)
Dept: INTERNAL MEDICINE | Facility: CLINIC | Age: 69
End: 2019-11-11
Payer: COMMERCIAL

## 2019-11-11 VITALS
SYSTOLIC BLOOD PRESSURE: 146 MMHG | OXYGEN SATURATION: 97 % | BODY MASS INDEX: 43.4 KG/M2 | WEIGHT: 293 LBS | HEART RATE: 75 BPM | TEMPERATURE: 96 F | HEIGHT: 69 IN | DIASTOLIC BLOOD PRESSURE: 92 MMHG

## 2019-11-11 DIAGNOSIS — I10 UNCONTROLLED HYPERTENSION: Primary | ICD-10-CM

## 2019-11-11 DIAGNOSIS — R94.31 ABNORMAL EKG: ICD-10-CM

## 2019-11-11 DIAGNOSIS — I10 UNCONTROLLED HYPERTENSION: ICD-10-CM

## 2019-11-11 DIAGNOSIS — R06.02 SHORTNESS OF BREATH: ICD-10-CM

## 2019-11-11 DIAGNOSIS — F41.9 ANXIETY: ICD-10-CM

## 2019-11-11 LAB
ANION GAP SERPL CALC-SCNC: 11 MMOL/L (ref 8–16)
BUN SERPL-MCNC: 15 MG/DL (ref 8–23)
CALCIUM SERPL-MCNC: 10.2 MG/DL (ref 8.7–10.5)
CHLORIDE SERPL-SCNC: 105 MMOL/L (ref 95–110)
CO2 SERPL-SCNC: 26 MMOL/L (ref 23–29)
CREAT SERPL-MCNC: 1.3 MG/DL (ref 0.5–1.4)
EST. GFR  (AFRICAN AMERICAN): 49 ML/MIN/1.73 M^2
EST. GFR  (NON AFRICAN AMERICAN): 43 ML/MIN/1.73 M^2
GLUCOSE SERPL-MCNC: 99 MG/DL (ref 70–110)
POTASSIUM SERPL-SCNC: 4 MMOL/L (ref 3.5–5.1)
SODIUM SERPL-SCNC: 142 MMOL/L (ref 136–145)

## 2019-11-11 PROCEDURE — 36415 COLL VENOUS BLD VENIPUNCTURE: CPT

## 2019-11-11 PROCEDURE — 84443 ASSAY THYROID STIM HORMONE: CPT

## 2019-11-11 PROCEDURE — 84439 ASSAY OF FREE THYROXINE: CPT

## 2019-11-11 PROCEDURE — 1101F PR PT FALLS ASSESS DOC 0-1 FALLS W/OUT INJ PAST YR: ICD-10-PCS | Mod: CPTII,S$GLB,, | Performed by: PHYSICIAN ASSISTANT

## 2019-11-11 PROCEDURE — 99999 PR PBB SHADOW E&M-EST. PATIENT-LVL V: ICD-10-PCS | Mod: PBBFAC,,, | Performed by: PHYSICIAN ASSISTANT

## 2019-11-11 PROCEDURE — 99999 PR PBB SHADOW E&M-EST. PATIENT-LVL V: CPT | Mod: PBBFAC,,, | Performed by: PHYSICIAN ASSISTANT

## 2019-11-11 PROCEDURE — 99214 OFFICE O/P EST MOD 30 MIN: CPT | Mod: S$GLB,,, | Performed by: PHYSICIAN ASSISTANT

## 2019-11-11 PROCEDURE — 80048 BASIC METABOLIC PNL TOTAL CA: CPT

## 2019-11-11 PROCEDURE — 99214 PR OFFICE/OUTPT VISIT, EST, LEVL IV, 30-39 MIN: ICD-10-PCS | Mod: S$GLB,,, | Performed by: PHYSICIAN ASSISTANT

## 2019-11-11 PROCEDURE — 3080F PR MOST RECENT DIASTOLIC BLOOD PRESSURE >= 90 MM HG: ICD-10-PCS | Mod: CPTII,S$GLB,, | Performed by: PHYSICIAN ASSISTANT

## 2019-11-11 PROCEDURE — 3080F DIAST BP >= 90 MM HG: CPT | Mod: CPTII,S$GLB,, | Performed by: PHYSICIAN ASSISTANT

## 2019-11-11 PROCEDURE — 3077F PR MOST RECENT SYSTOLIC BLOOD PRESSURE >= 140 MM HG: ICD-10-PCS | Mod: CPTII,S$GLB,, | Performed by: PHYSICIAN ASSISTANT

## 2019-11-11 PROCEDURE — 1101F PT FALLS ASSESS-DOCD LE1/YR: CPT | Mod: CPTII,S$GLB,, | Performed by: PHYSICIAN ASSISTANT

## 2019-11-11 PROCEDURE — 3077F SYST BP >= 140 MM HG: CPT | Mod: CPTII,S$GLB,, | Performed by: PHYSICIAN ASSISTANT

## 2019-11-11 RX ORDER — HYDROCHLOROTHIAZIDE 25 MG/1
25 TABLET ORAL DAILY
Qty: 90 TABLET | Refills: 3 | Status: SHIPPED | OUTPATIENT
Start: 2019-11-11 | End: 2020-07-18 | Stop reason: SDUPTHER

## 2019-11-11 RX ORDER — SERTRALINE HYDROCHLORIDE 50 MG/1
50 TABLET, FILM COATED ORAL DAILY
Qty: 90 TABLET | Refills: 0 | Status: SHIPPED | OUTPATIENT
Start: 2019-11-11 | End: 2020-01-30 | Stop reason: SDUPTHER

## 2019-11-11 RX ORDER — FUROSEMIDE 20 MG/1
TABLET ORAL
Qty: 90 TABLET | Refills: 0 | Status: SHIPPED | OUTPATIENT
Start: 2019-11-11 | End: 2020-01-30 | Stop reason: SDUPTHER

## 2019-11-11 NOTE — PROGRESS NOTES
Subjective:      Patient ID: Susu Luther is a 69 y.o. female.    Chief Complaint: Hypertension    Hypertension   This is a chronic problem. The current episode started 1 to 4 weeks ago. The problem has been rapidly worsening since onset. The problem is resistant. Associated symptoms include anxiety, headaches, malaise/fatigue, peripheral edema and shortness of breath. Pertinent negatives include no blurred vision, chest pain, neck pain, orthopnea, palpitations, PND or sweats. There are no associated agents to hypertension. Risk factors for coronary artery disease include obesity. Past treatments include alpha 1 blockers, diuretics and beta blockers. The current treatment provides no improvement. There are no compliance problems.    Anxiety   Presents for initial visit. Onset was 1 to 6 months ago. The problem has been rapidly worsening. Symptoms include depressed mood, excessive worry, irritability, malaise, nervous/anxious behavior, obsessions, restlessness and shortness of breath. Patient reports no chest pain, compulsions, confusion, decreased concentration, dizziness, dry mouth, feeling of choking, nausea, palpitations, panic or suicidal ideas. Symptoms occur constantly. The severity of symptoms is causing significant distress. Exacerbated by: health problems.     Risk factors include recent illness. Her past medical history is significant for anxiety/panic attacks and depression. There is no history of anemia, arrhythmia, asthma, bipolar disorder, CAD, CHF, chronic lung disease, fibromyalgia, hyperthyroidism or suicide attempts. Past treatments include benzodiazephines (venlafaxine). The treatment provided no relief. Compliance with prior treatments has been poor. Past compliance problems: side effects wtih effexor (diarrhea)       Review of Systems   Constitutional: Positive for irritability and malaise/fatigue. Negative for activity change, appetite change, chills, diaphoresis, fatigue, fever and  "unexpected weight change.   HENT: Negative.  Negative for congestion, hearing loss, postnasal drip, rhinorrhea, sore throat, trouble swallowing and voice change.    Eyes: Negative.  Negative for blurred vision and visual disturbance.   Respiratory: Positive for shortness of breath. Negative for cough, choking and chest tightness.    Cardiovascular: Negative for chest pain, palpitations, orthopnea, leg swelling and PND.   Gastrointestinal: Negative for abdominal distention, abdominal pain, blood in stool, constipation, diarrhea, nausea and vomiting.   Endocrine: Negative for cold intolerance, heat intolerance, polydipsia and polyuria.   Genitourinary: Negative.  Negative for difficulty urinating and frequency.   Musculoskeletal: Negative for arthralgias, back pain, gait problem, joint swelling, myalgias and neck pain.   Skin: Negative for color change, pallor, rash and wound.   Neurological: Positive for headaches. Negative for dizziness, tremors, weakness, light-headedness and numbness.   Hematological: Negative for adenopathy.   Psychiatric/Behavioral: Positive for dysphoric mood. Negative for agitation, behavioral problems, confusion, decreased concentration, hallucinations, self-injury, sleep disturbance and suicidal ideas. The patient is nervous/anxious. The patient is not hyperactive.      Objective:   BP (!) 146/92 (BP Location: Left arm, Patient Position: Sitting, BP Method: Medium (Manual))   Pulse 75   Temp 96.2 °F (35.7 °C) (Tympanic)   Ht 5' 9" (1.753 m)   Wt (!) 140 kg (308 lb 10.3 oz)   LMP  (LMP Unknown)   SpO2 97%   BMI 45.58 kg/m²     Physical Exam   Constitutional: She is oriented to person, place, and time. She appears well-developed and well-nourished.   HENT:   Head: Normocephalic and atraumatic.   Right Ear: External ear normal.   Left Ear: External ear normal.   Nose: Nose normal.   Mouth/Throat: Oropharynx is clear and moist.   Eyes: Pupils are equal, round, and reactive to light. " Conjunctivae and EOM are normal.   Neck: Normal range of motion. Neck supple.   Cardiovascular: Normal rate, regular rhythm and normal heart sounds. Exam reveals no gallop and no friction rub.   No murmur heard.  Pulmonary/Chest: Effort normal and breath sounds normal. No respiratory distress. She has no wheezes. She has no rales. She exhibits no tenderness.   Abdominal: Soft. She exhibits no distension. There is no tenderness.   Musculoskeletal: Normal range of motion.   Lymphadenopathy:     She has no cervical adenopathy.   Neurological: She is alert and oriented to person, place, and time.   Skin: Skin is warm and dry.   Psychiatric: She has a normal mood and affect. Her behavior is normal. Judgment and thought content normal. Thought content is not paranoid and not delusional. She expresses no homicidal and no suicidal ideation. She expresses no suicidal plans and no homicidal plans.   Vitals reviewed.      Assessment:     1. Uncontrolled hypertension    2. Shortness of breath    3. Abnormal EKG    4. Anxiety      Plan:   Uncontrolled hypertension  -     Basic metabolic panel; Future; Expected date: 11/11/2019  -     TSH; Future  -     T4, free; Future; Expected date: 11/11/2019  -     Ambulatory referral to Cardiology  -     hydroCHLOROthiazide (HYDRODIURIL) 25 MG tablet; Take 1 tablet (25 mg total) by mouth once daily.  Dispense: 90 tablet; Refill: 3  -     furosemide (LASIX) 20 MG tablet; take 1/2 tablet by mouth a day.  Dispense: 90 tablet; Refill: 0  -increase hctz from 12.5 to 25mg daily. Cont the rest of the medications as prescribed.   -log bp at home    Shortness of breath  -     Ambulatory referral to Cardiology    Abnormal EKG  -     Ambulatory referral to Cardiology    Anxiety  -     sertraline (ZOLOFT) 50 MG tablet; Take 1 tablet (50 mg total) by mouth once daily.  Dispense: 90 tablet; Refill: 0      Follow up in about 2 weeks (around 11/25/2019), or if symptoms worsen or fail to improve.

## 2019-11-11 NOTE — PATIENT INSTRUCTIONS
Taking Your Blood Pressure  Blood pressure is the force of blood against the artery wall as it moves from the heart through the blood vessels. You can take your own blood pressure reading using a digital monitor. Take your readings the same each time, using the same arm. Take readings as often as your healthcare provider instructs.  About blood pressure monitors  Blood pressure monitors are designed for certain ages and cases. You can find monitors for older adults, for pregnant women, and for children. Make sure the one you choose is the right one for your age and situation.  The American Heart Association recommends an automatic cuff monitor that fits on your upper arm (bicep). The cuff should fit your arm size. A cuff thats too large or too small will not give an accurate reading. Measure around your upper arm to find your size.  Monitors that attach to your finger or wrist are not as accurate as monitors for your upper arm.  Ask your healthcare provider for help in choosing a monitor. Bring your monitor to your next provider visit if you need help in using it the correct way.  The steps below are general instructions for using an automatic digital monitor.  Step 1. Relax    · Take your blood pressure at the same time every day, such as in the morning or evening, or at the time your healthcare provider recommends.  · Wait at least a half-hour after smoking, eating, or exercising. Don't drink coffee, tea, soda, or other caffeinated beverages before checking your blood pressure.  · Sit comfortably at a table with both feet on the floor. Do not cross your legs or feet. Place the monitor near you.  · Rest for a few minutes before you begin.  Step 2. Wrap the cuff    · Place your arm on the table, palm up. Your arm should be at the level of your heart. Wrap the cuff around your upper arm, just above your elbow. Its best done on bare skin, not over clothing. Most cuffs will indicate where the brachial artery (the  blood vessel in the middle of the arm at the inner side of the elbow) should line up with the cuff. Look in your monitor's instruction booklet for an illustration. You can also bring your cuff to your healthcare provider and have them show you how to correctly place the cuff.  Step 3. Inflate the cuff    · Push the button that starts the pump.  · The cuff will tighten, then loosen.  · The numbers will change. When they stop changing, your blood pressure reading will appear.  · Take 2 or 3 readings one minute apart.  Step 4. Write down the results of each reading    · Write down your blood pressure numbers for each reading. Note the date and time. Keep your results in one place, such as a notebook. Even if your monitor has a built-in memory, keep a hard copy of the readings.  · Remove the cuff from your arm. Turn off the machine.  · Bring your blood pressure records with your healthcare providers at each visit.  · If you start a new blood pressure medicine, note the day you started the new medicine. Also note the day if you change the dose of your medicine. This information goes on your blood pressure recording sheet. This will help your healthcare provider monitor how well the medicine changes are working.  · Ask your healthcare provider what numbers should prompt you to call him or her. Also ask what numbers should prompt you to get help right away.  Date Last Reviewed: 11/1/2016 © 2000-2017 [employer]. [address] [place] [id number]. All rights reserved. This information is not intended as a substitute for professional medical care. Always follow your healthcare professional's instructions.        Discharge Instructions: Taking Your Blood Pressure  Blood pressure is the force of blood as it moves from the heart through the blood vessels. You can take your own blood pressure reading using a digital monitor. Take readings as often as your healthcare provider instructs. Take your readings  each time in the same way, using the same arm. Here are guidelines for taking your blood pressure.  The American Heart Association (AHA) recommends purchasing a blood pressure monitor that is validated and approved by the Association for the Advancement of Medical Instrumentation, the [place] Hypertension [employer], and the International Protocol for the Validation of Automated BP Measuring Devices. If the blood pressure monitor is for a senior adult, a pregnant woman, or a child, make certain it is validated for use with such a population. For the most reliable readings, the AHA recommends an automatic, cuff-style, upper arm (bicep) monitor. The readings from finger and wrist monitors are not as reliable as the upper arm monitor.        Step 1. Relax    · Wait at least a half hour after smoking, eating, or exercising. Do not drink coffee, tea, soda, or other caffeinated beverages before checking your blood pressure.   · Sit comfortably at a table. Place the monitor near you.  · Rest for a few minutes before you begin.        Step 2. Wrap the cuff    · Place your arm on the table, palm up. Put your arm in a position that is level with your heart. Wrap the cuff around your upper arm, about an inch above your elbow. Its best to wrap the cuff on bare skin, not over clothing.  · Make sure your cuff fits. If it doesnt wrap around your upper arm, order a larger cuff. A cuff that is too large or too small can result in an inaccurate blood pressure reading.           Step 3. Inflate the cuff    · Pump the cuff until the scale reads 200. If you have a self-inflating cuff, push the button that starts the pump.  · The cuff will tighten, then loosen.  · The numbers will change. When they stop changing, your blood pressure reading will appear.  · If you get a reading that is too high or too low for you, relax for a few minutes. Then do the test again.    Step 4. Write down the results  · Write down your blood pressure numbers.  Frandy the date and time. Keep your results in one place, such as a notebook.  · Remove the cuff from your arm. Turn off the machine.  · Take the readings with you to your medical appointments.  · If you start a new blood pressure medicine, or change a blood pressure medicine dose, note the day you started the new drug or dosage on your blood pressure recording sheet. This will help your healthcare provider monitor the effect of medication changes.     Date Last Reviewed: 4/27/2016 © 2000-2016 [employer]. [address] [place] [address], [place] [address]. All rights reserved. This information is not intended as a substitute for professional medical care. Always follow your healthcare professional's instructions.

## 2019-11-12 LAB
T4 FREE SERPL-MCNC: 1.23 NG/DL (ref 0.71–1.51)
TSH SERPL DL<=0.005 MIU/L-ACNC: 0.72 UIU/ML (ref 0.4–4)

## 2019-11-13 ENCOUNTER — TELEPHONE (OUTPATIENT)
Dept: HEMATOLOGY/ONCOLOGY | Facility: CLINIC | Age: 69
End: 2019-11-13

## 2019-11-27 ENCOUNTER — TELEPHONE (OUTPATIENT)
Dept: PHARMACY | Facility: CLINIC | Age: 69
End: 2019-11-27

## 2019-11-27 NOTE — TELEPHONE ENCOUNTER
Contacted patient for letrozole refill and to f/u on her blood pressure.    Patient was seen on 11/11 regarding her blood pressure. At this appt, he hydrochlorothiazide dose was increased from 12.5 mg to 25 mg daily. She reports feeling much better and denies any headaches. She states that since then her BP has been running ~142/88.    Refills: Patient unable to give exact quantity of Letrozole on hand, but estimates about 8-10 days remaining (took dose today). Denies missed doses. Will ship medication on 12/3 for the patient to receive on 12/4. $0.00 copay. Address confirmed.     No changes in medications, allergies, or health conditions.     Patient has no further questions or concerns at this time.

## 2019-11-29 DIAGNOSIS — F32.A DEPRESSION, UNSPECIFIED DEPRESSION TYPE: ICD-10-CM

## 2019-11-29 DIAGNOSIS — C50.411 MALIGNANT NEOPLASM OF UPPER-OUTER QUADRANT OF RIGHT BREAST IN FEMALE, ESTROGEN RECEPTOR POSITIVE: Chronic | ICD-10-CM

## 2019-11-29 DIAGNOSIS — Z17.0 MALIGNANT NEOPLASM OF UPPER-OUTER QUADRANT OF RIGHT BREAST IN FEMALE, ESTROGEN RECEPTOR POSITIVE: Chronic | ICD-10-CM

## 2019-11-29 RX ORDER — VENLAFAXINE HYDROCHLORIDE 37.5 MG/1
37.5 CAPSULE, EXTENDED RELEASE ORAL DAILY
Qty: 30 CAPSULE | Refills: 2 | Status: SHIPPED | OUTPATIENT
Start: 2019-11-29 | End: 2019-12-16 | Stop reason: SDUPTHER

## 2019-11-29 RX ORDER — BUTALBITAL, ACETAMINOPHEN AND CAFFEINE 50; 325; 40 MG/1; MG/1; MG/1
1 CAPSULE ORAL 3 TIMES DAILY
Qty: 30 CAPSULE | Refills: 0 | Status: SHIPPED | OUTPATIENT
Start: 2019-11-29 | End: 2020-01-08 | Stop reason: SDUPTHER

## 2019-11-29 RX ORDER — TRAMADOL HYDROCHLORIDE 50 MG/1
50 TABLET ORAL EVERY 6 HOURS PRN
Qty: 50 TABLET | Refills: 0 | Status: SHIPPED | OUTPATIENT
Start: 2019-11-29 | End: 2020-01-08 | Stop reason: SDUPTHER

## 2019-12-12 ENCOUNTER — TELEPHONE (OUTPATIENT)
Dept: HEMATOLOGY/ONCOLOGY | Facility: CLINIC | Age: 69
End: 2019-12-12

## 2019-12-12 NOTE — TELEPHONE ENCOUNTER
----- Message from Lupe Bass LPN sent at 12/11/2019  4:43 PM CST -----  Contact: FELIPE WITH DR BERMAN- DENTAL      ----- Message -----  From: Sujey Jerry  Sent: 12/11/2019   1:21 PM CST  To: [facility] Staff    CALLING CONCERNING THE FORM ON PATIENT TO RECEIVE TREATMENT THAT HAVE NOT BEEN RETURNED. PLEASE FILL OUT AND SEND TO ASAP TODAY  @ [phone]. THANKS, EWA

## 2019-12-16 ENCOUNTER — SOCIAL WORK (OUTPATIENT)
Dept: HEMATOLOGY/ONCOLOGY | Facility: CLINIC | Age: 69
End: 2019-12-16

## 2019-12-16 ENCOUNTER — LAB VISIT (OUTPATIENT)
Dept: LAB | Facility: HOSPITAL | Age: 69
End: 2019-12-16
Attending: INTERNAL MEDICINE
Payer: COMMERCIAL

## 2019-12-16 ENCOUNTER — OFFICE VISIT (OUTPATIENT)
Dept: HEMATOLOGY/ONCOLOGY | Facility: CLINIC | Age: 69
End: 2019-12-16
Payer: COMMERCIAL

## 2019-12-16 VITALS
SYSTOLIC BLOOD PRESSURE: 162 MMHG | DIASTOLIC BLOOD PRESSURE: 96 MMHG | WEIGHT: 293 LBS | RESPIRATION RATE: 18 BRPM | HEART RATE: 73 BPM | BODY MASS INDEX: 43.4 KG/M2 | OXYGEN SATURATION: 95 % | HEIGHT: 69 IN | TEMPERATURE: 98 F

## 2019-12-16 DIAGNOSIS — I10 ESSENTIAL HYPERTENSION: ICD-10-CM

## 2019-12-16 DIAGNOSIS — Z17.0 MALIGNANT NEOPLASM OF UPPER-OUTER QUADRANT OF RIGHT BREAST IN FEMALE, ESTROGEN RECEPTOR POSITIVE: Chronic | ICD-10-CM

## 2019-12-16 DIAGNOSIS — Z17.0 MALIGNANT NEOPLASM OF UPPER-OUTER QUADRANT OF RIGHT BREAST IN FEMALE, ESTROGEN RECEPTOR POSITIVE: Primary | Chronic | ICD-10-CM

## 2019-12-16 DIAGNOSIS — C50.411 MALIGNANT NEOPLASM OF UPPER-OUTER QUADRANT OF RIGHT BREAST IN FEMALE, ESTROGEN RECEPTOR POSITIVE: Primary | Chronic | ICD-10-CM

## 2019-12-16 DIAGNOSIS — F32.A DEPRESSION, UNSPECIFIED DEPRESSION TYPE: ICD-10-CM

## 2019-12-16 DIAGNOSIS — C50.411 MALIGNANT NEOPLASM OF UPPER-OUTER QUADRANT OF RIGHT BREAST IN FEMALE, ESTROGEN RECEPTOR POSITIVE: Chronic | ICD-10-CM

## 2019-12-16 LAB
ALBUMIN SERPL BCP-MCNC: 3.4 G/DL (ref 3.5–5.2)
ALP SERPL-CCNC: 103 U/L (ref 55–135)
ALT SERPL W/O P-5'-P-CCNC: 18 U/L (ref 10–44)
ANION GAP SERPL CALC-SCNC: 12 MMOL/L (ref 8–16)
AST SERPL-CCNC: 11 U/L (ref 10–40)
BASOPHILS # BLD AUTO: 0.07 K/UL (ref 0–0.2)
BASOPHILS NFR BLD: 1 % (ref 0–1.9)
BILIRUB SERPL-MCNC: 0.3 MG/DL (ref 0.1–1)
BUN SERPL-MCNC: 13 MG/DL (ref 8–23)
CALCIUM SERPL-MCNC: 9.9 MG/DL (ref 8.7–10.5)
CHLORIDE SERPL-SCNC: 103 MMOL/L (ref 95–110)
CO2 SERPL-SCNC: 27 MMOL/L (ref 23–29)
CREAT SERPL-MCNC: 1.3 MG/DL (ref 0.5–1.4)
DIFFERENTIAL METHOD: ABNORMAL
EOSINOPHIL # BLD AUTO: 0.1 K/UL (ref 0–0.5)
EOSINOPHIL NFR BLD: 1.5 % (ref 0–8)
ERYTHROCYTE [DISTWIDTH] IN BLOOD BY AUTOMATED COUNT: 16.7 % (ref 11.5–14.5)
EST. GFR  (AFRICAN AMERICAN): 49 ML/MIN/1.73 M^2
EST. GFR  (NON AFRICAN AMERICAN): 43 ML/MIN/1.73 M^2
GLUCOSE SERPL-MCNC: 138 MG/DL (ref 70–110)
HCT VFR BLD AUTO: 39.4 % (ref 37–48.5)
HGB BLD-MCNC: 12.3 G/DL (ref 12–16)
IMM GRANULOCYTES # BLD AUTO: 0.01 K/UL (ref 0–0.04)
IMM GRANULOCYTES NFR BLD AUTO: 0.1 % (ref 0–0.5)
LYMPHOCYTES # BLD AUTO: 1.8 K/UL (ref 1–4.8)
LYMPHOCYTES NFR BLD: 24.5 % (ref 18–48)
MCH RBC QN AUTO: 24.2 PG (ref 27–31)
MCHC RBC AUTO-ENTMCNC: 31.2 G/DL (ref 32–36)
MCV RBC AUTO: 78 FL (ref 82–98)
MONOCYTES # BLD AUTO: 0.6 K/UL (ref 0.3–1)
MONOCYTES NFR BLD: 8 % (ref 4–15)
NEUTROPHILS # BLD AUTO: 4.7 K/UL (ref 1.8–7.7)
NEUTROPHILS NFR BLD: 65 % (ref 38–73)
NRBC BLD-RTO: 0 /100 WBC
PLATELET # BLD AUTO: 266 K/UL (ref 150–350)
PMV BLD AUTO: 9.5 FL (ref 9.2–12.9)
POTASSIUM SERPL-SCNC: 3.2 MMOL/L (ref 3.5–5.1)
PROT SERPL-MCNC: 7.5 G/DL (ref 6–8.4)
RBC # BLD AUTO: 5.08 M/UL (ref 4–5.4)
SODIUM SERPL-SCNC: 142 MMOL/L (ref 136–145)
WBC # BLD AUTO: 7.27 K/UL (ref 3.9–12.7)

## 2019-12-16 PROCEDURE — 99999 PR PBB SHADOW E&M-EST. PATIENT-LVL V: CPT | Mod: PBBFAC,,, | Performed by: INTERNAL MEDICINE

## 2019-12-16 PROCEDURE — 84443 ASSAY THYROID STIM HORMONE: CPT

## 2019-12-16 PROCEDURE — 1101F PR PT FALLS ASSESS DOC 0-1 FALLS W/OUT INJ PAST YR: ICD-10-PCS | Mod: CPTII,S$GLB,, | Performed by: INTERNAL MEDICINE

## 2019-12-16 PROCEDURE — 99999 PR PBB SHADOW E&M-EST. PATIENT-LVL V: ICD-10-PCS | Mod: PBBFAC,,, | Performed by: INTERNAL MEDICINE

## 2019-12-16 PROCEDURE — 3077F PR MOST RECENT SYSTOLIC BLOOD PRESSURE >= 140 MM HG: ICD-10-PCS | Mod: CPTII,S$GLB,, | Performed by: INTERNAL MEDICINE

## 2019-12-16 PROCEDURE — 1159F PR MEDICATION LIST DOCUMENTED IN MEDICAL RECORD: ICD-10-PCS | Mod: S$GLB,,, | Performed by: INTERNAL MEDICINE

## 2019-12-16 PROCEDURE — 3077F SYST BP >= 140 MM HG: CPT | Mod: CPTII,S$GLB,, | Performed by: INTERNAL MEDICINE

## 2019-12-16 PROCEDURE — 3080F PR MOST RECENT DIASTOLIC BLOOD PRESSURE >= 90 MM HG: ICD-10-PCS | Mod: CPTII,S$GLB,, | Performed by: INTERNAL MEDICINE

## 2019-12-16 PROCEDURE — 83540 ASSAY OF IRON: CPT

## 2019-12-16 PROCEDURE — 99215 OFFICE O/P EST HI 40 MIN: CPT | Mod: S$GLB,,, | Performed by: INTERNAL MEDICINE

## 2019-12-16 PROCEDURE — 1159F MED LIST DOCD IN RCRD: CPT | Mod: S$GLB,,, | Performed by: INTERNAL MEDICINE

## 2019-12-16 PROCEDURE — 1125F PR PAIN SEVERITY QUANTIFIED, PAIN PRESENT: ICD-10-PCS | Mod: S$GLB,,, | Performed by: INTERNAL MEDICINE

## 2019-12-16 PROCEDURE — 80053 COMPREHEN METABOLIC PANEL: CPT

## 2019-12-16 PROCEDURE — 82607 VITAMIN B-12: CPT

## 2019-12-16 PROCEDURE — 1125F AMNT PAIN NOTED PAIN PRSNT: CPT | Mod: S$GLB,,, | Performed by: INTERNAL MEDICINE

## 2019-12-16 PROCEDURE — 1101F PT FALLS ASSESS-DOCD LE1/YR: CPT | Mod: CPTII,S$GLB,, | Performed by: INTERNAL MEDICINE

## 2019-12-16 PROCEDURE — 85025 COMPLETE CBC W/AUTO DIFF WBC: CPT

## 2019-12-16 PROCEDURE — 82728 ASSAY OF FERRITIN: CPT

## 2019-12-16 PROCEDURE — 82746 ASSAY OF FOLIC ACID SERUM: CPT

## 2019-12-16 PROCEDURE — 99215 PR OFFICE/OUTPT VISIT, EST, LEVL V, 40-54 MIN: ICD-10-PCS | Mod: S$GLB,,, | Performed by: INTERNAL MEDICINE

## 2019-12-16 PROCEDURE — 36415 COLL VENOUS BLD VENIPUNCTURE: CPT

## 2019-12-16 PROCEDURE — 3080F DIAST BP >= 90 MM HG: CPT | Mod: CPTII,S$GLB,, | Performed by: INTERNAL MEDICINE

## 2019-12-16 RX ORDER — PROPRANOLOL HYDROCHLORIDE 40 MG/1
40 TABLET ORAL 2 TIMES DAILY
Qty: 60 TABLET | Refills: 11 | Status: SHIPPED | OUTPATIENT
Start: 2019-12-16 | End: 2020-12-15 | Stop reason: SDUPTHER

## 2019-12-16 RX ORDER — LIDOCAINE 50 MG/G
1 PATCH TOPICAL DAILY PRN
Qty: 30 PATCH | Refills: 0 | Status: SHIPPED | OUTPATIENT
Start: 2019-12-16 | End: 2020-08-06 | Stop reason: SDUPTHER

## 2019-12-16 RX ORDER — DIAZEPAM 10 MG/1
10 TABLET ORAL 2 TIMES DAILY
Qty: 60 TABLET | Refills: 1 | Status: SHIPPED | OUTPATIENT
Start: 2019-12-16 | End: 2020-01-08 | Stop reason: SDUPTHER

## 2019-12-16 RX ORDER — VENLAFAXINE HYDROCHLORIDE 75 MG/1
75 CAPSULE, EXTENDED RELEASE ORAL DAILY
Qty: 90 CAPSULE | Refills: 2 | Status: SHIPPED | OUTPATIENT
Start: 2019-12-16 | End: 2020-01-08 | Stop reason: SDUPTHER

## 2019-12-16 NOTE — LETTER
[facility]  [address] [facility] [address]  Phone: [phone]  Fax: [phone] December 16, 2019     Patient: Susu Luther   YOB: 1950   Date of Visit: 12/16/2019       To Whom It May Concern:  Mrs. Luther was seen today in our clinic, and she is cleared for any dental services required.     If you have any questions or concerns, please don't hesitate to contact my office.  [phone].      Sincerely,        Guido Huynh MD

## 2019-12-16 NOTE — PROGRESS NOTES
SW met with pt to briefly f/u from previous encounter. Pt shared details of her journey with diagnosis. Pt stated that she is continuing to be in a much better place. SW listened and provided emotional support.    F/u as needs arise.

## 2019-12-17 ENCOUNTER — TELEPHONE (OUTPATIENT)
Dept: INTERNAL MEDICINE | Facility: CLINIC | Age: 69
End: 2019-12-17

## 2019-12-17 LAB
FERRITIN SERPL-MCNC: 56 NG/ML (ref 20–300)
FOLATE SERPL-MCNC: 3.9 NG/ML (ref 4–24)
IRON SERPL-MCNC: 69 UG/DL (ref 30–160)
SATURATED IRON: 23 % (ref 20–50)
TOTAL IRON BINDING CAPACITY: 302 UG/DL (ref 250–450)
TRANSFERRIN SERPL-MCNC: 204 MG/DL (ref 200–375)
TSH SERPL DL<=0.005 MIU/L-ACNC: 1.17 UIU/ML (ref 0.4–4)
VIT B12 SERPL-MCNC: 994 PG/ML (ref 210–950)

## 2019-12-17 NOTE — TELEPHONE ENCOUNTER
----- Message from Elisabeth Ricci sent at 12/17/2019  2:31 PM CST -----  Contact: Patient   Type:  Sooner Apoointment Request    Caller is requesting a sooner appointment.  Caller declined first available appointment listed below.  Caller will not accept being placed on the waitlist and is requesting a message be sent to doctor.  Name of Caller:Susu Luther   When is the first available appointment?12/19/19  Symptoms:bp f/u  Would the patient rather a call back or a response via [facility]? call  Best Call Back Number:[phone]    Additional Information: pt has another appointment on 12/18. She would like to be worked into schedule around 11 AM.

## 2019-12-18 ENCOUNTER — TELEPHONE (OUTPATIENT)
Dept: PHARMACY | Facility: CLINIC | Age: 69
End: 2019-12-18

## 2019-12-18 RX ORDER — MULTIVITAMIN
1 TABLET ORAL DAILY
COMMUNITY

## 2019-12-18 RX ORDER — GABAPENTIN 400 MG/1
400 CAPSULE ORAL 3 TIMES DAILY PRN
COMMUNITY
End: 2020-02-20

## 2019-12-18 RX ORDER — BISMUTH SUBSALICYLATE 525 MG/30ML
15 LIQUID ORAL EVERY 6 HOURS PRN
COMMUNITY
End: 2022-02-15 | Stop reason: ALTCHOICE

## 2019-12-18 NOTE — TELEPHONE ENCOUNTER
"Contacted patient for [facility] clinical f/u.     Dosing, how taking: Letrozole 2.5 mg - Takes 1 tablet in AM around 7-8 am with glass of water. Denies missed doses.   Storage: Stores medication on bedroom dresser at room temperature.  Handling: Touches medication directly, but washes hands afterwards. Patient aware of handling precautions.   Side effects:   -Hot flashes and night sweats (daily). MD increased Effexor to 75 mg QD on Monday - will monitor for improvement in symptoms.   -diarrhea (sometimes): uses Pepto Bismo and Kaopectate PRN (helps)  -nausea (sometimes): uses Zofran PRN (helps)  Recent infections: No signs of infection - no fever, achy chills, or wet persistent cough. Patient thought she was getting the flu because of sweating, which has since resolved.   Pain: 0/10 - denies pain.  Appetite: Patient states that "I don't have a good appetite"and  "I make myself eat". Only eats 1 meal/day (small meal). Patient takes multivitamin QD. Recommended to incorporate Boost/Ensure into diet daily. Also, recommended Breakfast Essentials.    Energy, fatigue: Patient struggles to complete daily and social activities - "I make myself do things". She states that after she's out and starts doing things that it's alright.  Health, mood, QOL: Denies depression and stress. She reports having anxiety - on diazepam (helps). Also, reports having good family and friend support.   ED/UC visits: ED (~3 weeks ago) - BP was really high. Reports her BP currently runs ~150/96 (states that this is her normal). Discussed the importance of continuing to monitor that BP doesn't get too high and reviewed symptoms of high BP: severe headaches, fatigue, irregular heartbeat, etc.  Next clinical follow up: 3 months   Labs reviewed.    Medication list reviewed, no clinically significant DDIs. Patient also takes multivitamin daily, Pepto Bismol PRN, and Kaopectate PRN. Patient takes Aspirin 81 mg every other day    No changes in allergies and " health conditions.     Patient has no question or concerns at this time. She is aware to contact [facility] if she needs anything.

## 2020-01-02 ENCOUNTER — TELEPHONE (OUTPATIENT)
Dept: HEMATOLOGY/ONCOLOGY | Facility: CLINIC | Age: 70
End: 2020-01-02

## 2020-01-02 NOTE — TELEPHONE ENCOUNTER
----- Message from Guido Huynh MD sent at 1/2/2020 12:27 PM CST -----  Contact: pt   May I see Mrs. Luther next Wednesday please    ----- Message -----  From: Lupe Bass LPN  Sent: 1/2/2020  10:30 AM CST  To: Guido Huynh MD        ----- Message -----  From: Sameera Kramer  Sent: 1/2/2020   9:56 AM CST  To: [facility] Staff    Call pt in regards to having bad hot flashes/night sweats. Pt states that she was given some med but it is not working. Pt states that it has gotten worst.   ..[phone] (home) or [phone]

## 2020-01-08 ENCOUNTER — SOCIAL WORK (OUTPATIENT)
Dept: HEMATOLOGY/ONCOLOGY | Facility: CLINIC | Age: 70
End: 2020-01-08

## 2020-01-08 ENCOUNTER — OFFICE VISIT (OUTPATIENT)
Dept: HEMATOLOGY/ONCOLOGY | Facility: CLINIC | Age: 70
End: 2020-01-08
Payer: COMMERCIAL

## 2020-01-08 VITALS
BODY MASS INDEX: 43.4 KG/M2 | DIASTOLIC BLOOD PRESSURE: 82 MMHG | TEMPERATURE: 98 F | RESPIRATION RATE: 18 BRPM | SYSTOLIC BLOOD PRESSURE: 120 MMHG | HEIGHT: 69 IN | HEART RATE: 52 BPM | OXYGEN SATURATION: 96 % | WEIGHT: 293 LBS

## 2020-01-08 DIAGNOSIS — Z17.0 MALIGNANT NEOPLASM OF UPPER-OUTER QUADRANT OF RIGHT BREAST IN FEMALE, ESTROGEN RECEPTOR POSITIVE: Primary | Chronic | ICD-10-CM

## 2020-01-08 DIAGNOSIS — F32.A DEPRESSION, UNSPECIFIED DEPRESSION TYPE: ICD-10-CM

## 2020-01-08 DIAGNOSIS — C50.411 MALIGNANT NEOPLASM OF UPPER-OUTER QUADRANT OF RIGHT BREAST IN FEMALE, ESTROGEN RECEPTOR POSITIVE: Chronic | ICD-10-CM

## 2020-01-08 DIAGNOSIS — C50.411 MALIGNANT NEOPLASM OF UPPER-OUTER QUADRANT OF RIGHT BREAST IN FEMALE, ESTROGEN RECEPTOR POSITIVE: Primary | Chronic | ICD-10-CM

## 2020-01-08 DIAGNOSIS — Z17.0 MALIGNANT NEOPLASM OF UPPER-OUTER QUADRANT OF RIGHT BREAST IN FEMALE, ESTROGEN RECEPTOR POSITIVE: Chronic | ICD-10-CM

## 2020-01-08 PROCEDURE — 3074F PR MOST RECENT SYSTOLIC BLOOD PRESSURE < 130 MM HG: ICD-10-PCS | Mod: CPTII,S$GLB,, | Performed by: INTERNAL MEDICINE

## 2020-01-08 PROCEDURE — 99999 PR PBB SHADOW E&M-EST. PATIENT-LVL V: CPT | Mod: PBBFAC,,, | Performed by: INTERNAL MEDICINE

## 2020-01-08 PROCEDURE — 3074F SYST BP LT 130 MM HG: CPT | Mod: CPTII,S$GLB,, | Performed by: INTERNAL MEDICINE

## 2020-01-08 PROCEDURE — 3079F DIAST BP 80-89 MM HG: CPT | Mod: CPTII,S$GLB,, | Performed by: INTERNAL MEDICINE

## 2020-01-08 PROCEDURE — 1101F PR PT FALLS ASSESS DOC 0-1 FALLS W/OUT INJ PAST YR: ICD-10-PCS | Mod: CPTII,S$GLB,, | Performed by: INTERNAL MEDICINE

## 2020-01-08 PROCEDURE — 1159F MED LIST DOCD IN RCRD: CPT | Mod: S$GLB,,, | Performed by: INTERNAL MEDICINE

## 2020-01-08 PROCEDURE — 1125F AMNT PAIN NOTED PAIN PRSNT: CPT | Mod: S$GLB,,, | Performed by: INTERNAL MEDICINE

## 2020-01-08 PROCEDURE — 1159F PR MEDICATION LIST DOCUMENTED IN MEDICAL RECORD: ICD-10-PCS | Mod: S$GLB,,, | Performed by: INTERNAL MEDICINE

## 2020-01-08 PROCEDURE — 1125F PR PAIN SEVERITY QUANTIFIED, PAIN PRESENT: ICD-10-PCS | Mod: S$GLB,,, | Performed by: INTERNAL MEDICINE

## 2020-01-08 PROCEDURE — 1101F PT FALLS ASSESS-DOCD LE1/YR: CPT | Mod: CPTII,S$GLB,, | Performed by: INTERNAL MEDICINE

## 2020-01-08 PROCEDURE — 99999 PR PBB SHADOW E&M-EST. PATIENT-LVL V: ICD-10-PCS | Mod: PBBFAC,,, | Performed by: INTERNAL MEDICINE

## 2020-01-08 PROCEDURE — 3079F PR MOST RECENT DIASTOLIC BLOOD PRESSURE 80-89 MM HG: ICD-10-PCS | Mod: CPTII,S$GLB,, | Performed by: INTERNAL MEDICINE

## 2020-01-08 PROCEDURE — 99215 PR OFFICE/OUTPT VISIT, EST, LEVL V, 40-54 MIN: ICD-10-PCS | Mod: S$GLB,,, | Performed by: INTERNAL MEDICINE

## 2020-01-08 PROCEDURE — 99215 OFFICE O/P EST HI 40 MIN: CPT | Mod: S$GLB,,, | Performed by: INTERNAL MEDICINE

## 2020-01-08 RX ORDER — VENLAFAXINE HYDROCHLORIDE 150 MG/1
150 CAPSULE, EXTENDED RELEASE ORAL DAILY
Qty: 90 CAPSULE | Refills: 3 | Status: SHIPPED | OUTPATIENT
Start: 2020-01-08 | End: 2020-01-08 | Stop reason: SDUPTHER

## 2020-01-08 RX ORDER — VENLAFAXINE HYDROCHLORIDE 150 MG/1
150 CAPSULE, EXTENDED RELEASE ORAL DAILY
Qty: 90 CAPSULE | Refills: 3 | Status: SHIPPED | OUTPATIENT
Start: 2020-01-08 | End: 2020-03-25

## 2020-01-08 RX ORDER — TRAMADOL HYDROCHLORIDE 50 MG/1
50 TABLET ORAL EVERY 6 HOURS PRN
Qty: 50 TABLET | Refills: 0 | Status: SHIPPED | OUTPATIENT
Start: 2020-01-08 | End: 2020-04-08 | Stop reason: SDUPTHER

## 2020-01-08 RX ORDER — DIAZEPAM 10 MG/1
10 TABLET ORAL 2 TIMES DAILY PRN
Qty: 60 TABLET | Refills: 1 | Status: SHIPPED | OUTPATIENT
Start: 2020-01-08 | End: 2020-03-11 | Stop reason: SDUPTHER

## 2020-01-08 RX ORDER — BUTALBITAL, ACETAMINOPHEN AND CAFFEINE 50; 325; 40 MG/1; MG/1; MG/1
1 CAPSULE ORAL 3 TIMES DAILY PRN
Qty: 60 CAPSULE | Refills: 0 | Status: SHIPPED | OUTPATIENT
Start: 2020-01-08 | End: 2020-01-08 | Stop reason: SDUPTHER

## 2020-01-08 RX ORDER — BUTALBITAL, ACETAMINOPHEN AND CAFFEINE 50; 325; 40 MG/1; MG/1; MG/1
1 CAPSULE ORAL 3 TIMES DAILY PRN
Qty: 60 CAPSULE | Refills: 0 | Status: SHIPPED | OUTPATIENT
Start: 2020-01-08 | End: 2020-03-25 | Stop reason: SDUPTHER

## 2020-01-08 RX ORDER — TRAMADOL HYDROCHLORIDE 50 MG/1
50 TABLET ORAL EVERY 6 HOURS PRN
Qty: 50 TABLET | Refills: 0 | Status: SHIPPED | OUTPATIENT
Start: 2020-01-08 | End: 2020-01-08 | Stop reason: SDUPTHER

## 2020-01-08 NOTE — PROGRESS NOTES
Subjective:       Patient ID: Susu Luther is a 70 y.o. female.    Chief Complaint: Malignant neoplasm of upper-outer quadrant of right breast in female, estrogen receptor positive [C50.411, Z17.0]  HPI: We have an opportunity to see Ms. Susu Luther in Hematology Oncology clinic at [facility] on 01/07/2020.  Ms. Susu Luther is a 70 y.o. woman with pT2N0 invasive lobular breast cancer ER positive RI negative Her2 negative s/p lumpectomy and sentinel node biopsy.       Was found right breast mass on self breast exam.  Screening mammogram on 2/26/2019 showed Impression:  Right  Mass: Right breast mass at the lower outer middle position. Assessment: 0 - Incomplete. Special Views: Spot Compression View and Ultrasound are recommended.      Left  There is no mammographic evidence of malignancy.     On March 4, 2019, underwent US guided biopsy showed FINAL PATHOLOGIC DIAGNOSIS  1. BREAST, RIGHT, LOWER OUTER 08:00, ULTRASOUND-GUIDED BIOPSY:  Invasive lobular carcinoma of breast.  Size of invasive carcinoma: 19 mm in greatest linear dimension within a single core biopsy fragment.  Diller Histologic Score: Grade 2 of 3.  Tubule formation: 3  Nuclear pleomorphism: 2  Mitoses: 1  Associated lobular carcinoma in situ (LCIS) is present.  No lymphovascular or perineural invasion.  Breast biomarkers are pending and will be included in the supplemental report.  2. AXILLA, RIGHT LYMPH NODES, ULTRASOUND-GUIDED BIOPSY:  Benign lymph node without evidence of metastatic carcinoma.  Immunohistochemical stains (Cam 5.2 and CK7) are confirmatory.     On March 27, 2019 underwent lumpectomy with sentinel node.  Pathology showed   PROCEDURE: Excision/lumpectomy  SPECIMEN LATERALITY: Right breast  TUMOR SITE: 8 o'clock  TUMOR SIZE: Approximately 4.0 x 3.0 x 3.0 cm  HISTOLOGIC TYPE: Invasive lobular carcinoma  HISTOLOGIC GRADE: Grade 2 of 3  Tubular differentiation: 3  Nuclear pleomorphism: 2  Mitotic rate:  1  TUMOR FOCALITY: Single focus of invasive carcinoma  DUCTAL CARCINOMA IN SITU: Not identified  LOBULAR CARCINOMA IN SITU: Present  MARGINS:  Main specimen (#6) : Carcinoma present at superior, deep and medial margins  Distance from inferior margin: 5 mm  Distance from lateral margin: greater than 20 mm  Distance of anterior margin: 15 mm  Additionally submitted margins (specimen #7): Distance from newly designated margin: 4 mm  REGIONAL LYMPH NODES: Uninvolved tumor cells  Number of total lymph nodes examined: 8  Number of sentinel nodes examined: 4  TREATMENT EFFECT: No known presurgical therapy  LYMPH-VASCULAR INVASION: Not identified  ANCILLARY STUDIES (performed on patient's previous biopsy MS ):  ER: Positive (95% of tumor cells)  CT: Negative (less than 1 % of tumor cells)  Her2 by FISH: Negative (not amplified)  Ki-67%: Positive (70 % of tumor cells)  ADDITIONAL FINDINGS: Apocrine metaplasia, usual ductal hyperplasia, fibroadenomatoid change  PATHOLOGIC STAGE CLASSIFICATION: pT2 pN0     Oncotype type DX recurrence score 22, with distant recurrence risk 8%, absolute chemotherapy benefit <1%.     Completed adjuvant radiation.  Has right breast tenderness.  Started on anastrozole.  Has arthralgias. Could not tolerate.  AI was changed to letrozole, tolerating well.                    Has hot flashes and depression.  Cymbalta was started but had nausea diarrhea, could not tolerate. Currently on Effexor.  Hot flashes improved, but still uncomfortable.         Malignant neoplasm of upper-outer quadrant of right breast in female, estrogen receptor positive    3/14/2019 Initial Diagnosis     Malignant neoplasm of upper-outer quadrant of right breast in female, estrogen receptor positive      3/27/2019 Cancer Staged     Staging form: Breast, AJCC 8th Edition  - Pathologic stage from 3/27/2019: Stage IIA (pT2, pN0(sn), cM0, G2, ER+, CT-, HER2-) - Signed by Guido Huynh MD on 4/4/2019 5/20/2019 - 6/18/2019  Radiation Therapy     Treatment Site Ref. ID Energy Dose/Fx (Gy) #Fx Dose Correction (Gy) Total Dose (Gy) Start Date End Date Elapsed Days   Boost Boost 18X 2.5 4 / 4 0 10 6/13/2019 6/18/2019 5   RtBreastAddMV Rt Breast 18X/6X 2.66 16 / 16 0 42.56 5/20/2019 6/12/2019 23            Past Medical History:   Diagnosis Date    Encounter for blood transfusion     Hypertension     Malignant neoplasm of upper-outer quadrant of right breast in female, estrogen receptor positive 3/14/2019    radiation    Stroke 2009    no residual defect     Family History   Problem Relation Age of Onset    Diabetes Maternal Grandmother     Diabetes Maternal Grandfather     Diabetes Mother     Breast cancer Neg Hx     Colon cancer Neg Hx     Ovarian cancer Neg Hx      Social History     Socioeconomic History    Marital status:      Spouse name: Not on file    Number of children: Not on file    Years of education: Not on file    Highest education level: Not on file   Occupational History    Not on file   Social Needs    Financial resource strain: Not on file    Food insecurity:     Worry: Not on file     Inability: Not on file    Transportation needs:     Medical: Not on file     Non-medical: Not on file   Tobacco Use    Smoking status: Never Smoker    Smokeless tobacco: Never Used   Substance and Sexual Activity    Alcohol use: No    Drug use: No    Sexual activity: Yes     Partners: Male     Birth control/protection: Post-menopausal   Lifestyle    Physical activity:     Days per week: Not on file     Minutes per session: Not on file    Stress: Not on file   Relationships    Social connections:     Talks on phone: Not on file     Gets together: Not on file     Attends [employer] service: Not on file     Active member of club or organization: Not on file     Attends meetings of clubs or organizations: Not on file     Relationship status: Not on file   Other Topics Concern    Not on file   Social History  Narrative    Not on file     Past Surgical History:   Procedure Laterality Date    APPENDECTOMY      BREAST BIOPSY      2019    BREAST LUMPECTOMY      2019     SECTION      x 1    OOPHORECTOMY      right     SENTINEL LYMPH NODE BIOPSY Right 3/27/2019    Procedure: BIOPSY, LYMPH NODE, SENTINEL;  Surgeon: Artemio Starks MD;  Location: [facility];  Service: General;  Laterality: Right;     Current Outpatient Medications   Medication Sig Dispense Refill    albuterol (VENTOLIN HFA) 90 mcg/actuation inhaler Inhale 2 puffs into the lungs every 4 (four) hours as needed for Shortness of Breath. 18 g 11    aspirin (ECOTRIN) 81 MG EC tablet Take 1 tablet (81 mg total) by mouth once daily. (Patient taking differently: Take 81 mg by mouth every other day. )  0    atorvastatin (LIPITOR) 40 MG tablet Take 1 tablet (40 mg total) by mouth once daily. 90 tablet 3    bismuth subsalicylate (PEPTO BISMOL) 262 mg/15 mL suspension Take 15 mLs by mouth every 6 (six) hours as needed for Indigestion.      blood pressure kit med and lrg Kit Take blood pressure first thing in the morning. 1 each 0    butalbital-acetaminophen-caff -40 mg -40 mg Cap Take 1 tablet by mouth 3 (three) times daily. (Patient taking differently: Take 1 tablet by mouth 3 (three) times daily as needed. ) 30 capsule 0    cholecalciferol, vitamin D3, 50,000 unit capsule Take 50,000 Units by mouth once a week.   0    cloNIDine (CATAPRES) 0.1 MG tablet Take 1 tablet (0.1 mg total) by mouth 3 (three) times daily. 270 tablet 2    diazePAM (VALIUM) 10 MG Tab Take 1 tablet (10 mg total) by mouth 2 (two) times daily. 60 tablet 1    emollient combination no.63 (MIADERM) Lotn Apply 1 application topically 3 (three) times daily. (Patient taking differently: Apply 1 application topically 3 (three) times daily as needed. )      furosemide (LASIX) 20 MG tablet take 1/2 tablet by mouth a day. 90 tablet 0    gabapentin (NEURONTIN) 400 MG capsule  Take 400 mg by mouth 3 (three) times daily as needed.      hydroCHLOROthiazide (HYDRODIURIL) 25 MG tablet Take 1 tablet (25 mg total) by mouth once daily. 90 tablet 3    inhalation spacing device (COMPACT SPACE CHAMBER) Use as directed for inhalation. 1 Device 0    letrozole (FEMARA) 2.5 mg Tab Take 1 tablet (2.5 mg total) by mouth once daily. 30 tablet 11    lidocaine (LIDODERM) 5 % Place 1 patch onto the skin daily as needed. Remove & Discard patch within 12 hours or as directed by MD 30 patch 0    lidocaine HCl-menthol 4-1 % PtMd Apply 1 patch topically daily as needed. (Patient not taking: Reported on 12/18/2019) 30 patch 6    losartan (COZAAR) 100 MG tablet Take 100 mg by mouth once daily.  3    morphine (MSIR) 15 MG tablet Take 1 tablet (15 mg total) by mouth every 6 (six) hours as needed for Pain. 40 tablet 0    multivitamin (ONE DAILY MULTIVITAMIN) per tablet Take 1 tablet by mouth once daily.      naproxen (NAPROSYN) 500 MG tablet Take 1 tablet (500 mg total) by mouth 2 (two) times daily with meals. (Patient not taking: Reported on 12/18/2019) 60 tablet 0    NARCAN 4 mg/actuation [place] CALL 911. ADMINISTER A SINGLE SPRAY INTRANASALLY INTO ONE NOSTRIL UPON SIGNS OF OPIOID OVERDOSE. MAY REPEAT AFTER 3 MINUTES IF NO RESPONSE.  0    OLANZapine (ZYPREXA) 10 MG tablet Take 10 mg by mouth nightly.      ondansetron (ZOFRAN-ODT) 8 MG TbDL Take 1 tablet (8 mg total) by mouth every 8 (eight) hours as needed (nausea). 60 tablet 1    prochlorperazine (COMPAZINE) 10 MG tablet Take 1 tablet (10 mg total) by mouth 4 (four) times daily as needed (nausea). 60 tablet 1    propranolol (INDERAL) 40 MG tablet Take 1 tablet (40 mg total) by mouth 2 (two) times daily. 60 tablet 11    sertraline (ZOLOFT) 50 MG tablet Take 1 tablet (50 mg total) by mouth once daily. (Patient not taking: Reported on 12/16/2019) 90 tablet 0    traMADol (ULTRAM) 50 mg tablet Take 1 tablet (50 mg total) by mouth every 6 (six) hours as  needed for Pain. 50 tablet 0    venlafaxine (EFFEXOR-XR) 75 MG 24 hr capsule Take 1 capsule (75 mg total) by mouth once daily. 90 capsule 2     No current facility-administered medications for this visit.        Labs:  Lab Results   Component Value Date    WBC 7.27 12/16/2019    HGB 12.3 12/16/2019    HCT 39.4 12/16/2019    MCV 78 (L) 12/16/2019     12/16/2019     BMP  Lab Results   Component Value Date     12/16/2019    K 3.2 (L) 12/16/2019     12/16/2019    CO2 27 12/16/2019    BUN 13 12/16/2019    CREATININE 1.3 12/16/2019    CALCIUM 9.9 12/16/2019    ANIONGAP 12 12/16/2019    ESTGFRAFRICA 49 (A) 12/16/2019    EGFRNONAA 43 (A) 12/16/2019     Lab Results   Component Value Date    ALT 18 12/16/2019    AST 11 12/16/2019    ALKPHOS 103 12/16/2019    BILITOT 0.3 12/16/2019       Lab Results   Component Value Date    IRON 69 12/16/2019    TIBC 302 12/16/2019    FERRITIN 56 12/16/2019     Lab Results   Component Value Date    MQUHNUFZ87 994 (H) 12/16/2019     Lab Results   Component Value Date    FOLATE 3.9 (L) 12/16/2019     Lab Results   Component Value Date    TSH 1.170 12/16/2019       I have reviewed the radiology reports and examined the scan/xray images.    Review of Systems   Constitutional: Negative.    HENT: Negative.    Eyes: Negative.    Respiratory: Negative.    Cardiovascular: Negative.    Gastrointestinal: Negative.    Endocrine: Negative.    Genitourinary: Negative.    Musculoskeletal: Negative.    Skin: Negative.    Allergic/Immunologic: Negative.    Neurological: Negative.    Hematological: Negative.    Psychiatric/Behavioral: Negative.      ECOG SCORE              Objective:   There were no vitals filed for this visit.There is no height or weight on file to calculate BMI.  Physical Exam   Constitutional: She is oriented to person, place, and time. She appears well-developed and well-nourished.   HENT:   Head: Normocephalic and atraumatic.   Eyes: Conjunctivae and EOM are normal.    Neck: Normal range of motion. Neck supple.   Cardiovascular: Normal rate and regular rhythm.   Pulmonary/Chest: Effort normal and breath sounds normal. There is breast tenderness. Breasts are asymmetrical.   Abdominal: Soft. Bowel sounds are normal.   Musculoskeletal: Normal range of motion.   Neurological: She is alert and oriented to person, place, and time.   Skin: Skin is warm and dry.   Psychiatric: She has a normal mood and affect. Her behavior is normal. Judgment and thought content normal.   Nursing note and vitals reviewed.        Assessment:      1. Malignant neoplasm of upper-outer quadrant of right breast in female, estrogen receptor positive    2. Depression, unspecified depression type    3. Malignant neoplasm of upper-outer quadrant of right breast in female, estrogen receptor positive           Plan:     Malignant neoplasm of upper-outer quadrant of right breast in female, estrogen receptor positive  Continue on letrozole  Will get US breast to evaluate fullness on right breast, reports tenderness.  Will get MRI right breast if US is not informative.  -     butalbital-acetaminophen-caff -40 mg -40 mg Cap; Take 1 tablet by mouth 3 (three) times daily as needed.  Dispense: 60 capsule; Refill: 0  -     traMADol (ULTRAM) 50 mg tablet; Take 1 tablet (50 mg total) by mouth every 6 (six) hours as needed for Pain.  Dispense: 50 tablet; Refill: 0  -     diazePAM (VALIUM) 10 MG Tab; Take 1 tablet (10 mg total) by mouth 2 (two) times daily as needed.  Dispense: 60 tablet; Refill: 1  -     US Breast Right Complete; Future; Expected date: 01/08/2020    Depression, unspecified depression type    -     venlafaxine (EFFEXOR-XR) 150 MG Cp24; Take 1 capsule (150 mg total) by mouth once daily.  Dispense: 90 capsule; Refill: 3  -     Ambulatory Referral to Hematology/Oncology/Psychology    Malignant neoplasm of upper-outer quadrant of right breast in female, estrogen receptor positive    -      butalbital-acetaminophen-caff -40 mg -40 mg Cap; Take 1 tablet by mouth 3 (three) times daily as needed.  Dispense: 60 capsule; Refill: 0  -     traMADol (ULTRAM) 50 mg tablet; Take 1 tablet (50 mg total) by mouth every 6 (six) hours as needed for Pain.  Dispense: 50 tablet; Refill: 0  -     diazePAM (VALIUM) 10 MG Tab; Take 1 tablet (10 mg total) by mouth 2 (two) times daily as needed.  Dispense: 60 tablet; Refill: 1  -     US Breast Right Complete; Future; Expected date: 01/08/2020

## 2020-01-09 ENCOUNTER — HOSPITAL ENCOUNTER (OUTPATIENT)
Dept: RADIOLOGY | Facility: HOSPITAL | Age: 70
Discharge: HOME | End: 2020-01-09
Attending: INTERNAL MEDICINE
Payer: COMMERCIAL

## 2020-01-09 VITALS — HEIGHT: 69 IN | WEIGHT: 293 LBS | BODY MASS INDEX: 43.4 KG/M2

## 2020-01-09 DIAGNOSIS — Z17.0 MALIGNANT NEOPLASM OF UPPER-OUTER QUADRANT OF RIGHT BREAST IN FEMALE, ESTROGEN RECEPTOR POSITIVE: Chronic | ICD-10-CM

## 2020-01-09 DIAGNOSIS — C50.411 MALIGNANT NEOPLASM OF UPPER-OUTER QUADRANT OF RIGHT BREAST IN FEMALE, ESTROGEN RECEPTOR POSITIVE: Chronic | ICD-10-CM

## 2020-01-09 DIAGNOSIS — C50.411 MALIGNANT NEOPLASM OF UPPER-OUTER QUADRANT OF RIGHT BREAST IN FEMALE, ESTROGEN RECEPTOR POSITIVE: ICD-10-CM

## 2020-01-09 DIAGNOSIS — Z17.0 MALIGNANT NEOPLASM OF UPPER-OUTER QUADRANT OF RIGHT BREAST IN FEMALE, ESTROGEN RECEPTOR POSITIVE: ICD-10-CM

## 2020-01-09 PROCEDURE — 77061 MAMMO DIGITAL DIAGNOSTIC RIGHT WITH TOMOSYNTHESIS_CAD: ICD-10-PCS | Mod: 26,,, | Performed by: RADIOLOGY

## 2020-01-09 PROCEDURE — 76642 ULTRASOUND BREAST LIMITED: CPT | Mod: TC,RT

## 2020-01-09 PROCEDURE — 77061 BREAST TOMOSYNTHESIS UNI: CPT | Mod: 26,,, | Performed by: RADIOLOGY

## 2020-01-09 PROCEDURE — 77065 MAMMO DIGITAL DIAGNOSTIC RIGHT WITH TOMOSYNTHESIS_CAD: ICD-10-PCS | Mod: 26,,, | Performed by: RADIOLOGY

## 2020-01-09 PROCEDURE — 76642 US BREAST RIGHT LIMITED: ICD-10-PCS | Mod: 26,RT,, | Performed by: RADIOLOGY

## 2020-01-09 PROCEDURE — 77065 DX MAMMO INCL CAD UNI: CPT | Mod: TC

## 2020-01-09 PROCEDURE — 77061 BREAST TOMOSYNTHESIS UNI: CPT | Mod: TC

## 2020-01-09 PROCEDURE — 76642 ULTRASOUND BREAST LIMITED: CPT | Mod: 26,RT,, | Performed by: RADIOLOGY

## 2020-01-09 PROCEDURE — 77065 DX MAMMO INCL CAD UNI: CPT | Mod: 26,,, | Performed by: RADIOLOGY

## 2020-01-10 NOTE — PROGRESS NOTES
Pt was referred to [facility] by Amina for telepsych appt. SW met with pt and her spouse, Campos, to discuss telepsych process. Pt explained her views of psychologist and SW listened to respond appropriately. Pt does not  Truly see a benefit in speaking with a psychologist but has chosen to since her medical team and  believe it would be beneficial.  stated that he does not want her to color on her tablet all day and would like for her to express herself to someone else outside of the family. Pt agreed to speak with Dr. Aviles on 1/14/2020 at 9:30am.    F/u as needs arise.

## 2020-01-13 ENCOUNTER — TELEPHONE (OUTPATIENT)
Dept: HEMATOLOGY/ONCOLOGY | Facility: CLINIC | Age: 70
End: 2020-01-13

## 2020-01-13 NOTE — TELEPHONE ENCOUNTER
[facility] called to confirm patient's appointment for tomorrow. Sw called and left VM on home and cell phone number listed. [facility] will continue to f/u.

## 2020-01-14 ENCOUNTER — OFFICE VISIT (OUTPATIENT)
Dept: PSYCHIATRY | Facility: CLINIC | Age: 70
End: 2020-01-14
Payer: COMMERCIAL

## 2020-01-14 DIAGNOSIS — F32.1 MAJOR DEPRESSIVE DISORDER, SINGLE EPISODE, MODERATE WITH ANXIOUS DISTRESS: Primary | ICD-10-CM

## 2020-01-14 DIAGNOSIS — C50.411 MALIGNANT NEOPLASM OF UPPER-OUTER QUADRANT OF RIGHT BREAST IN FEMALE, ESTROGEN RECEPTOR POSITIVE: ICD-10-CM

## 2020-01-14 DIAGNOSIS — Z17.0 MALIGNANT NEOPLASM OF UPPER-OUTER QUADRANT OF RIGHT BREAST IN FEMALE, ESTROGEN RECEPTOR POSITIVE: ICD-10-CM

## 2020-01-14 PROCEDURE — 90791 PSYCH DIAGNOSTIC EVALUATION: CPT | Mod: GT,S$GLB,, | Performed by: PSYCHOLOGIST

## 2020-01-14 PROCEDURE — 90791 PR PSYCHIATRIC DIAGNOSTIC EVALUATION: ICD-10-PCS | Mod: GT,S$GLB,, | Performed by: PSYCHOLOGIST

## 2020-01-14 NOTE — PROGRESS NOTES
INFORMED CONSENT/ LIMITS of CONFIDENTIALITY: Prior to beginning the interview, the patient's identification was confirmed via name and date of birth. Susu Luther  was informed of the possible risks and benefits of psychological interventions (e.g., counseling, psychotherapy, testing) and provided information regarding the handling of protected health records and   the limits of confidentiality, including the importance of reporting any suicidal or homicidal ideation to ensure safety of all parties. This provider explained the purpose of today's appointment and the patient was provided with time to ask questions regarding this information.  Acceptance and understanding of these conditions was expressed, and Susu Luther freely consented to this evaluation.       PSYCHO-ONCOLOGY INTAKE    Diagnostic Interview - CPT 19251    Date: 1/14/2020  Site: [facility]     Evaluation Length (direct face-to-face time):  1 hour     Referral Source: Guido Huynh MD   Oncologist:   PCP: Kennedy Rojas MD    Clinical status of patient: Outpatient    Susu Luther, a 70 y.o. female, seen for initial evaluation visit.  Met with patient.    Chief complaint/reason for encounter: adjustment to illness, depression and anxiety    Medical/Surgical History:    Patient Active Problem List   Diagnosis    Shortness of breath on exertion    NILS (obstructive sleep apnea)    Malignant neoplasm of upper-outer quadrant of right breast in female, estrogen receptor positive    Hypertension    Stroke    Morbid obesity with BMI of 45.0-49.9, adult    Headache    Circadian rhythm sleep disorder    Nocturnal hypoxemia    Chronic pain of breast       Health Behaviors:       ETOH Use: No (rare)       Tobacco Use: No   Illicit Drug Use:  No     Prescription Misuse:No   Caffeine: Coffee: 1 standard cup per day   Exercise:The patient engages in little, if any physical activity.   Firearms:  No   Advanced  directives:No     Family History:   Psychiatric illness: No     Alcohol/Drug Abuse: No     Suicide: No      Past Psychiatric History:   Inpatient treatment: No     Outpatient treatment: Yes Saw counselor in 1972 for Fertility Issues     Prior substance abuse treatment: No     Suicide Attempts: No     Psychotropic Medications:  Current: Effexor       Past: Zoloft    Current medications as per below, allergies reviewed in chart.    Current Outpatient Medications   Medication    albuterol (VENTOLIN HFA) 90 mcg/actuation inhaler    aspirin (ECOTRIN) 81 MG EC tablet    atorvastatin (LIPITOR) 40 MG tablet    bismuth subsalicylate (PEPTO BISMOL) 262 mg/15 mL suspension    blood pressure kit med and lrg Kit    butalbital-acetaminophen-caff -40 mg -40 mg Cap    cholecalciferol, vitamin D3, 50,000 unit capsule    cloNIDine (CATAPRES) 0.1 MG tablet    diazePAM (VALIUM) 10 MG Tab    emollient combination no.63 (MIADERM) Lotn    furosemide (LASIX) 20 MG tablet    gabapentin (NEURONTIN) 400 MG capsule    hydroCHLOROthiazide (HYDRODIURIL) 25 MG tablet    inhalation spacing device (COMPACT SPACE CHAMBER)    letrozole (FEMARA) 2.5 mg Tab    lidocaine (LIDODERM) 5 %    lidocaine HCl-menthol 4-1 % PtMd    losartan (COZAAR) 100 MG tablet    morphine (MSIR) 15 MG tablet    multivitamin (ONE DAILY MULTIVITAMIN) per tablet    naproxen (NAPROSYN) 500 MG tablet    NARCAN 4 mg/actuation Spry    OLANZapine (ZYPREXA) 10 MG tablet    ondansetron (ZOFRAN-ODT) 8 MG TbDL    prochlorperazine (COMPAZINE) 10 MG tablet    propranolol (INDERAL) 40 MG tablet    sertraline (ZOLOFT) 50 MG tablet    traMADol (ULTRAM) 50 mg tablet    venlafaxine (EFFEXOR-XR) 150 MG Cp24     No current facility-administered medications for this visit.        CAM Therapies: None     Screening: Depression: Positive  Toshia: Denies Psychosis: Denies    Generalized anxiety: Positive Panic Disorder: Denies    Social/specific phobia:  Denies OCD: Denies   Sexual Dysfunction:  Denies Trauma: Denies      Social situation/Stressors: Susu Luther lives with , TONO Hidalgo.  She is retired.   Susu Luther has been  50 years and has two children.  Her spouse is retired.   The patient reports good social support.emotional (e.g., nurturance), tangible (e.g., financial assistance), informational (e.g., advice), or companionship (e.g., sense of belonging) and intangible (e.g. personal advice).  Susu Luther is an active member of the [employer].  Susu Luther's hobbies include reading, community service.    Additional stressors:  None    Strengths:Able to vocalize needs, Values and traditions, Setting and pursuing goals, hopes, dreams, aspirations, Resources - social, interpersonal, monetary, Vocational interests, hobbies and/or talents, Interpersonal relationships and supports available - family, relatives, friends, Cultural/spiritual/Yarsani and community involvement and Financial stability  Liabilities: Other: Isolation, anhedonia    Current Evaluation:     Mental Status Exam: Susu Luther arrived promptly for the assessment session.  The patient was fully cooperative throughout the interview and was an adequate historian   Appearance: age appropriate, appropriately  dressed, adequately  groomed  Behavior/Cooperation: friendly and cooperative  Speech: normal in rate, volume, and tone and appropriate quality, quantity and organization of sentences  Mood: depressed  Affect: dysphoric, tearful  Thought Process: goal-directed, logical  Thought Content: normal, no suicidality, no homicidality, delusions, or paranoia;did not appear to be responding to internal stimuli during the interview.   Orientation: grossly intact  Memory: Grossly intact  Attention Span/Concentration: Attends to interview without distraction; reports no difficulty  Fund of Knowledge: average  Estimate of Intelligence: average from  verbal skills and history  Cognition: grossly intact  Insight: patient has awareness of illness; good insight into own behavior and behavior of others  Judgment: the patient's behavior is adequate to circumstances    Distress Score    Distress Score: 4        Practical Problems Physical Problems   : No Appearance: No   Housing: No Bathing / Dressing: No   Insurance / Financial: No Breathing: Yes    Transportation: No  Changes in Urination: No    Work / School: No  Constipation: No   Treatment Decisions: Yes  Diarrhea: No     Eating: Yes    Family Problems Fatigue: Yes    Dealing with Children: No Feeling Swollen: No    Dealing with Partner: Yes Fevers: No    Ability to Have Children: No  Getting Around: No    Family Health Issues: No  Indigestion: No     Memory / Concentration: No   Emotional Problems Mouth Sores: No    Depression: Yes  Nausea: Yes    Fears: No  Nose Dry / Congested: No    Nervousness: Yes  Pain: Yes    Sadness: Yes Sexual: No    Worry: Yes Skin Dry / Itchy: No    Loss of Interest in Usual Activities: Yes Sleep: Yes     Substance Abuse: No    Spiritual/Religions Concerns Tingling in Hands / Feet: Yes   Spritual / Scientologist Concerns: No         Other Problems            Patient Health Questionnaire-9 (PHQ-9)     1.  Little interest or pleasure in doing things: More than half of days  = 2   2.  Feeling down, depressed or hopeless: More than half of days  = 2   3.  Trouble falling or staying asleep, or sleeping too much: Not at all                       = 0   4.  Feeling tired or having little energy: Nearly every day           = 3   5.  Poor appetite or overeating: Nearly every day           = 3   6.  Feeling bad about yourself - or that you are a failure or have let yourself or your family down: Not at all                       = 0   7.  Trouble concentrating on things, such as reading the newspaper or watching television: Not at all                       = 0   8.  Moving or speaking so  slowly that other people could have noticed.  Or the opposite - being fidgety or restless that you have been moving around a lot more than usual: Not at all                       = 0   9.  Thoughts that you would be better off dead, or of hurting yourself: Not at all                       = 0    PHQ-9 Total:  10, mild-moderate     Generalized Anxiety Disorder Questionnaire-7 (CLARA-7)  The patient completed the General Anxiety Disorder, 7 Items (GAD7)and received a score of 14, indicating moderate anxiety symptoms presently impacting current functioning.     History of present illness:       Malignant neoplasm of upper-outer quadrant of right breast in female, estrogen receptor positive    3/14/2019 Initial Diagnosis     Malignant neoplasm of upper-outer quadrant of right breast in female, estrogen receptor positive      3/27/2019 Cancer Staged     Staging form: Breast, AJCC 8th Edition  - Pathologic stage from 3/27/2019: Stage IIA (pT2, pN0(sn), cM0, G2, ER+, KS-, HER2-) - Signed by Guido Huynh MD on 4/4/2019 5/20/2019 - 6/18/2019 Radiation Therapy     Treatment Site Ref. ID Energy Dose/Fx (Gy) #Fx Dose Correction (Gy) Total Dose (Gy) Start Date End Date Elapsed Days   Boost Boost 18X 2.5 4 / 4 0 10 6/13/2019 6/18/2019 5   RtBreastAddMV Rt Breast 18X/6X 2.66 16 / 16 0 42.56 5/20/2019 6/12/2019 23            Patient completed radiation in May 2019, and felt some difficulty adjusting back to normal life.  Patient is experiencing symptoms of depression and anxiety including anhedonia, depressed mood, loss of appetite, and difficulty, social isolation. She spends most of her day either in bed or on the sofa. She also feels like a burden to her family. She has support from her  and 2 daughter as well as close friends.    Susu Luther has adjusted to illness with difficulty primarily through passive coping strategies. She has engaged in appropriate information gathering.  The patient has good  family/friend support.  Her support system is coping well with the diagnosis/treatment/prognosis. Illness-related psychosocial stressors include none.  The patient has a good partnership with her [facility] [place]ology treatment team. The patient reports the following barriers to cancer care:none.   Symptoms:   · Mood: depressed mood, diminished interest, insomnia, psychomotor retardation, fatigue, worthlessness/guilt, tearfulness and social isolation;  no prior and no SI/HI  · Anxiety: Feeling nervous, anxious, or on edge, Uncontrollable worry (about health), Excessive worry (interfering with functioning), Difficulty relaxing and Irritability; anxiety throughout adulthood  · Substance abuse: denied  · Cognitive functioning: denied  · Health behaviors: noncontributory  · Sleep: Patient spends most of the day in bed. The patient reports 5 hours of uninterrupted sleep per night. restless sleep , interrupted sleep and non-restorative sleep ,daily naps daily , AM caffeine no use of OTC/melatonin/hypnotics/benzodiazepines    · Pain: Ms. Luther reports knee pain, managed with injections. Right arm and breast pain present.       Assessment - Diagnosis - Goals:       ICD-10-CM ICD-9-CM   1. Major depressive disorder, single episode, moderate with anxious distress F32.1 296.22   2. Malignant neoplasm of upper-outer quadrant of right breast in female, estrogen receptor positive C50.411 174.4    Z17.0 V86.0     Plan:individual psychotherapy and medication management by physician     Summary and Recommendations  Susu Luther is a 70 y.o. female referred by Guido uHynh MD for psychological evaluation and treatment.  Ms. Luther appears to be coping poorly with her diagnosis and proposed treatment course.  Patient was encouraged to speak to her primary care physician or oncologist regarding psychotropic medication options. She is interested in CBT to address depression/anxiety/insomnia and will follow up with me for that  purpose. Mood protective strategies during cancer treatment were discussed.   She was encouraged to continue with pleasant events scheduling and to increase exercise. .    GOALS:   Increase exercise  Pleasant events scheduling  Discuss psychotropic medication options with PCP  Discuss psychotropic medication options with oncologist    Next Session:  Behavioral Activation    Cecily Aviles, PhD  Clinical Psychologist  [place] License #[id number]

## 2020-01-22 ENCOUNTER — TELEPHONE (OUTPATIENT)
Dept: HEMATOLOGY/ONCOLOGY | Facility: CLINIC | Age: 70
End: 2020-01-22

## 2020-01-22 NOTE — TELEPHONE ENCOUNTER
----- Message from Taty Almodovar sent at 1/22/2020  4:13 PM CST -----  Contact: Pt  Pt states she received a missed call and asked that nurse return her call at ([phone])

## 2020-01-23 ENCOUNTER — TELEPHONE (OUTPATIENT)
Dept: HEMATOLOGY/ONCOLOGY | Facility: CLINIC | Age: 70
End: 2020-01-23

## 2020-01-23 NOTE — TELEPHONE ENCOUNTER
Attempted to reach pt to notify her of need to move appointment on 1/28 with Dr. Aviles because of Telemedicine update (system will be down from 6am-10am). Rescheduled pt to the following Tuesday 2/4 at 9:30am. Left detailed message of the change on voicemail and requested call back with questions or concerns.

## 2020-01-27 NOTE — PROGRESS NOTES
Home Oxygen Evaluation    Date Performed: 2019    1) Patient's Home O2 Sat on room air, while at rest: 92        If O2 sats on room air at rest are 88% or below, patient qualifies. No additional testing needed. Document N/A in steps 2 and 3. If 89% or above, complete steps 2.      2) Patient's O2 Sat on room air while exercisin        If O2 sats on room air while exercising remain 89% or above patient does not qualify, no further testing needed Document N/A in step 3. If O2 sats on room air while exercising are 88% or below, continue to step 3.      3) Patient's O2 Sat while exercising on O2: na at na LPM         (Must show improvement from #2 for patients to qualify)    If O2 sats improve on oxygen, patient qualifies for portable oxygen. If not, the patient does not qualify.         Last Office Visit:01/24/2020  Future Office Visit:none  Last Med refill:04/10/2019        Medication  refilled per Protocol

## 2020-01-28 ENCOUNTER — OFFICE VISIT (OUTPATIENT)
Dept: HEMATOLOGY/ONCOLOGY | Facility: CLINIC | Age: 70
End: 2020-01-28
Payer: COMMERCIAL

## 2020-01-28 ENCOUNTER — TELEPHONE (OUTPATIENT)
Dept: PHARMACY | Facility: CLINIC | Age: 70
End: 2020-01-28

## 2020-01-28 VITALS
HEART RATE: 78 BPM | TEMPERATURE: 97 F | BODY MASS INDEX: 42.97 KG/M2 | SYSTOLIC BLOOD PRESSURE: 143 MMHG | DIASTOLIC BLOOD PRESSURE: 95 MMHG | WEIGHT: 291 LBS

## 2020-01-28 DIAGNOSIS — Z91.89 AT HIGH RISK FOR BREAST CANCER: ICD-10-CM

## 2020-01-28 DIAGNOSIS — N64.4 CHRONIC PAIN OF BREAST: Primary | ICD-10-CM

## 2020-01-28 DIAGNOSIS — C50.411 MALIGNANT NEOPLASM OF UPPER-OUTER QUADRANT OF RIGHT BREAST IN FEMALE, ESTROGEN RECEPTOR POSITIVE: Chronic | ICD-10-CM

## 2020-01-28 DIAGNOSIS — G89.29 CHRONIC PAIN OF BREAST: Primary | ICD-10-CM

## 2020-01-28 DIAGNOSIS — Z17.0 MALIGNANT NEOPLASM OF UPPER-OUTER QUADRANT OF RIGHT BREAST IN FEMALE, ESTROGEN RECEPTOR POSITIVE: Chronic | ICD-10-CM

## 2020-01-28 PROCEDURE — 3077F PR MOST RECENT SYSTOLIC BLOOD PRESSURE >= 140 MM HG: ICD-10-PCS | Mod: CPTII,S$GLB,, | Performed by: NURSE PRACTITIONER

## 2020-01-28 PROCEDURE — 99999 PR PBB SHADOW E&M-EST. PATIENT-LVL III: ICD-10-PCS | Mod: PBBFAC,,, | Performed by: NURSE PRACTITIONER

## 2020-01-28 PROCEDURE — 3077F SYST BP >= 140 MM HG: CPT | Mod: CPTII,S$GLB,, | Performed by: NURSE PRACTITIONER

## 2020-01-28 PROCEDURE — 3080F DIAST BP >= 90 MM HG: CPT | Mod: CPTII,S$GLB,, | Performed by: NURSE PRACTITIONER

## 2020-01-28 PROCEDURE — 99214 PR OFFICE/OUTPT VISIT, EST, LEVL IV, 30-39 MIN: ICD-10-PCS | Mod: S$GLB,,, | Performed by: NURSE PRACTITIONER

## 2020-01-28 PROCEDURE — 1125F PR PAIN SEVERITY QUANTIFIED, PAIN PRESENT: ICD-10-PCS | Mod: S$GLB,,, | Performed by: NURSE PRACTITIONER

## 2020-01-28 PROCEDURE — 99999 PR PBB SHADOW E&M-EST. PATIENT-LVL III: CPT | Mod: PBBFAC,,, | Performed by: NURSE PRACTITIONER

## 2020-01-28 PROCEDURE — 3080F PR MOST RECENT DIASTOLIC BLOOD PRESSURE >= 90 MM HG: ICD-10-PCS | Mod: CPTII,S$GLB,, | Performed by: NURSE PRACTITIONER

## 2020-01-28 PROCEDURE — 1125F AMNT PAIN NOTED PAIN PRSNT: CPT | Mod: S$GLB,,, | Performed by: NURSE PRACTITIONER

## 2020-01-28 PROCEDURE — 1159F PR MEDICATION LIST DOCUMENTED IN MEDICAL RECORD: ICD-10-PCS | Mod: S$GLB,,, | Performed by: NURSE PRACTITIONER

## 2020-01-28 PROCEDURE — 1101F PR PT FALLS ASSESS DOC 0-1 FALLS W/OUT INJ PAST YR: ICD-10-PCS | Mod: CPTII,S$GLB,, | Performed by: NURSE PRACTITIONER

## 2020-01-28 PROCEDURE — 99214 OFFICE O/P EST MOD 30 MIN: CPT | Mod: S$GLB,,, | Performed by: NURSE PRACTITIONER

## 2020-01-28 PROCEDURE — 1159F MED LIST DOCD IN RCRD: CPT | Mod: S$GLB,,, | Performed by: NURSE PRACTITIONER

## 2020-01-28 PROCEDURE — 1101F PT FALLS ASSESS-DOCD LE1/YR: CPT | Mod: CPTII,S$GLB,, | Performed by: NURSE PRACTITIONER

## 2020-01-28 NOTE — PROGRESS NOTES
"Subjective:       Patient ID: Susu Luther is a 70 y.o. female.    Chief Complaint: Breast Pain    HPI:  Patient has a history of right breast cancer, invasive lobular, diagnosed March 4, 2019.  Stage T2 N0 invasive lobular.  ER positive SC negative her 2-negative nodes status post lumpectomy.      Oncotype recurrence score 22.  Completed adjuvant radiation June 18, 2019  Started Arimidex June 2019.  August 26, 2019 patient was switched to letrozole due to persistent nausea on Arimidex.    Patient reports persistent hot flashes.  She is on Effexor 75 mg and takes clonidine for blood pressure which can also help with hot flashes.    September 2019 patient had bilateral mammogram that revealed right breast seroma in the area of lumpectomy.  January 9, 2020 patient had right mammogram and ultrasound for workup of persistent right breast pain.  No mention of seroma presents during this exam.    Onset of breast pain - right breast- late August - after radiation  Intermittent verses constant- some days worse than others- movement of right arm causes more sensitivity in right breast  Describes as- ache- nipple is hypersensitive.   Rates as- some days 9.5- usually 8-9  Exacerbating factor- movement of right side causes it to hurt worse   Relieving factor- lidocaine patches help and cold compresses  Any change in medications, caffeine use our physical activity- no change    Found "second lump 3 weeks ago" - can still feel it - no change- right breast mammo and ultrasound wnl-     Pt reports right nipple d/c - brownish- spontaneous - August - every day- stopped a month ago- nipple     Review of Systems   Constitutional: Negative.    HENT: Negative.    Eyes: Negative.    Respiratory: Negative.    Cardiovascular: Negative.    Gastrointestinal: Negative.         No reflux   Endocrine: Negative.    Genitourinary: Negative.    Musculoskeletal: Negative.    Skin: Negative.    Allergic/Immunologic: Negative.  "   Neurological: Negative.    Hematological: Negative.  Negative for adenopathy.   Psychiatric/Behavioral: Negative.        Objective:      Physical Exam   Constitutional: She is oriented to person, place, and time. She appears well-developed and well-nourished.   HENT:   Head: Normocephalic and atraumatic.   Right Ear: External ear normal.   Left Ear: External ear normal.   Mouth/Throat: No oropharyngeal exudate.   Eyes: Pupils are equal, round, and reactive to light. Conjunctivae and EOM are normal. Right eye exhibits no discharge. Left eye exhibits no discharge. No scleral icterus.   Neck: Normal range of motion. Neck supple. No thyromegaly present.   Cardiovascular: Normal rate, regular rhythm and normal heart sounds.   Pulmonary/Chest: Effort normal and breath sounds normal. Right breast exhibits no inverted nipple, no mass, no nipple discharge, no skin change and no tenderness. Left breast exhibits no inverted nipple, no mass, no nipple discharge, no skin change and no tenderness.   Right breast entire lower portion in radiation field is very tender to palpation with no discrete abnormality except for the tiny superficial nodule noted. No nipple discharge noted with moderate compression.        Abdominal: Soft. Bowel sounds are normal.   Musculoskeletal: Normal range of motion. She exhibits no edema.        Right shoulder: She exhibits no crepitus and normal strength.   Lymphadenopathy:        Head (right side): No submental, no submandibular, no tonsillar, no preauricular, no posterior auricular and no occipital adenopathy present.        Head (left side): No submental, no submandibular, no tonsillar, no preauricular, no posterior auricular and no occipital adenopathy present.     She has no cervical adenopathy.        Right cervical: No superficial cervical and no posterior cervical adenopathy present.       Left cervical: No superficial cervical and no posterior cervical adenopathy present.     She has no  axillary adenopathy.        Right: No supraclavicular adenopathy present.        Left: No supraclavicular adenopathy present.   Neurological: She is alert and oriented to person, place, and time. She has normal reflexes.   Skin: Skin is warm and dry. No rash noted. No erythema. No pallor.   Psychiatric: She has a normal mood and affect. Her behavior is normal. Judgment and thought content normal.         Assessment:       1. Chronic pain of breast    2. Malignant neoplasm of upper-outer quadrant of right breast in female, estrogen receptor positive    3. At high risk for breast cancer        Plan:       1.     Right breast invasive lobular carcinoma, ER positive NM negative her 2-, status post right lumpectomy with negative nodes. Status post completion adjuvant radiation.  Taking letrozole for 10 years.  2.     Right breast pain. Chronic.  Negative imaging-mammogram and ultrasound.  MRI for further evaluation. See me after for review. Patient takes tramadol- does not help. Wearing sports bra 24/7 - no relief- pt willing to go to PT to see if can help with movement and ROM.   3.      F/u after MRI to plan for PT and assess pt again- I think PT will help pt somewhat. If this does not ease discomfort- recommend pt see pain mgt  4.      Good support bra, cold or heat packs as needed.  Lidocaine patch.  5.      Allowed pt to verbalize frustration and fears- pt reassured - pt verbalized understanding

## 2020-01-28 NOTE — TELEPHONE ENCOUNTER
Refill: Patient confirmed that she is in need of a refill for her letrozole. She states that she has about #4 tablets remaining. Denies any missed doses. Letrozole will be shipped on 1/28 for patient to receive on 1/29. Address confirmed. $7.50 ([facility]). Medications reviewed. No new allergies or health conditions. Notes no major side effects from letrozole and overall tolerating medication well.

## 2020-01-30 DIAGNOSIS — I10 UNCONTROLLED HYPERTENSION: ICD-10-CM

## 2020-01-30 DIAGNOSIS — F41.9 ANXIETY: ICD-10-CM

## 2020-01-30 RX ORDER — FUROSEMIDE 20 MG/1
TABLET ORAL
Qty: 90 TABLET | Refills: 0 | Status: SHIPPED | OUTPATIENT
Start: 2020-01-30 | End: 2020-05-01 | Stop reason: SDUPTHER

## 2020-01-30 RX ORDER — FUROSEMIDE 20 MG/1
TABLET ORAL
Qty: 90 TABLET | Refills: 0 | Status: CANCELLED | OUTPATIENT
Start: 2020-01-30

## 2020-01-30 RX ORDER — SERTRALINE HYDROCHLORIDE 50 MG/1
50 TABLET, FILM COATED ORAL DAILY
Qty: 90 TABLET | Refills: 0 | Status: CANCELLED | OUTPATIENT
Start: 2020-01-30 | End: 2020-02-29

## 2020-01-30 RX ORDER — SERTRALINE HYDROCHLORIDE 50 MG/1
50 TABLET, FILM COATED ORAL DAILY
Qty: 90 TABLET | Refills: 0 | Status: SHIPPED | OUTPATIENT
Start: 2020-01-30 | End: 2020-03-31 | Stop reason: SDUPTHER

## 2020-02-03 ENCOUNTER — TELEPHONE (OUTPATIENT)
Dept: HEMATOLOGY/ONCOLOGY | Facility: CLINIC | Age: 70
End: 2020-02-03

## 2020-02-03 NOTE — TELEPHONE ENCOUNTER
Tobin called patient on today to confirm appointment scheduled for tomorrow at 9:30. Tobin did not receive an answer. Tobin left .

## 2020-02-04 ENCOUNTER — TELEPHONE (OUTPATIENT)
Dept: HEMATOLOGY/ONCOLOGY | Facility: CLINIC | Age: 70
End: 2020-02-04

## 2020-02-04 NOTE — TELEPHONE ENCOUNTER
Sw called patient to verify today's appointment. Sw reports appt is scheduled for 9:30am. She reports she is not feeling well today and will not be able to make it. Pt reports she will call back to reschedule.

## 2020-02-10 ENCOUNTER — TELEPHONE (OUTPATIENT)
Dept: RADIOLOGY | Facility: HOSPITAL | Age: 70
End: 2020-02-10

## 2020-02-11 ENCOUNTER — TELEPHONE (OUTPATIENT)
Dept: HEMATOLOGY/ONCOLOGY | Facility: CLINIC | Age: 70
End: 2020-02-11

## 2020-02-11 ENCOUNTER — HOSPITAL ENCOUNTER (OUTPATIENT)
Dept: RADIOLOGY | Facility: HOSPITAL | Age: 70
Discharge: HOME | End: 2020-02-11
Attending: NURSE PRACTITIONER
Payer: COMMERCIAL

## 2020-02-11 ENCOUNTER — LAB VISIT (OUTPATIENT)
Dept: LAB | Facility: HOSPITAL | Age: 70
End: 2020-02-11
Attending: NURSE PRACTITIONER
Payer: COMMERCIAL

## 2020-02-11 DIAGNOSIS — Z17.0 MALIGNANT NEOPLASM OF UPPER-OUTER QUADRANT OF RIGHT BREAST IN FEMALE, ESTROGEN RECEPTOR POSITIVE: Chronic | ICD-10-CM

## 2020-02-11 DIAGNOSIS — Z91.89 AT HIGH RISK FOR BREAST CANCER: ICD-10-CM

## 2020-02-11 DIAGNOSIS — C50.411 MALIGNANT NEOPLASM OF UPPER-OUTER QUADRANT OF RIGHT BREAST IN FEMALE, ESTROGEN RECEPTOR POSITIVE: Chronic | ICD-10-CM

## 2020-02-11 DIAGNOSIS — G89.29 CHRONIC PAIN OF BREAST: ICD-10-CM

## 2020-02-11 DIAGNOSIS — N64.4 CHRONIC PAIN OF BREAST: ICD-10-CM

## 2020-02-11 LAB
ANION GAP SERPL CALC-SCNC: 7 MMOL/L (ref 8–16)
BUN SERPL-MCNC: 11 MG/DL (ref 8–23)
CALCIUM SERPL-MCNC: 9.3 MG/DL (ref 8.7–10.5)
CHLORIDE SERPL-SCNC: 108 MMOL/L (ref 95–110)
CO2 SERPL-SCNC: 28 MMOL/L (ref 23–29)
CREAT SERPL-MCNC: 1.3 MG/DL (ref 0.5–1.4)
EST. GFR  (AFRICAN AMERICAN): 49 ML/MIN/1.73 M^2
EST. GFR  (NON AFRICAN AMERICAN): 42 ML/MIN/1.73 M^2
GLUCOSE SERPL-MCNC: 74 MG/DL (ref 70–110)
POTASSIUM SERPL-SCNC: 3.4 MMOL/L (ref 3.5–5.1)
SODIUM SERPL-SCNC: 143 MMOL/L (ref 136–145)

## 2020-02-11 PROCEDURE — 77049 MRI BREAST C-+ W/CAD BI: CPT | Mod: 26,,, | Performed by: RADIOLOGY

## 2020-02-11 PROCEDURE — 25500020 PHARM REV CODE 255: Performed by: NURSE PRACTITIONER

## 2020-02-11 PROCEDURE — 36415 COLL VENOUS BLD VENIPUNCTURE: CPT

## 2020-02-11 PROCEDURE — A9577 INJ MULTIHANCE: HCPCS | Performed by: NURSE PRACTITIONER

## 2020-02-11 PROCEDURE — 77049 MRI BREAST C-+ W/CAD BI: CPT | Mod: TC

## 2020-02-11 PROCEDURE — 77049 MRI BREAST W/WO CONTRAST, W/CAD, BILATERAL: ICD-10-PCS | Mod: 26,,, | Performed by: RADIOLOGY

## 2020-02-11 PROCEDURE — 80048 BASIC METABOLIC PNL TOTAL CA: CPT

## 2020-02-11 RX ADMIN — GADOBENATE DIMEGLUMINE 15 ML: 529 INJECTION, SOLUTION INTRAVENOUS at 10:02

## 2020-02-11 NOTE — TELEPHONE ENCOUNTER
----- Message from Lili Burks NP sent at 2/11/2020  2:53 PM CST -----  Please let pt know her breast mri is fine but to keep her exam appt for 2/19/2020

## 2020-02-19 ENCOUNTER — OFFICE VISIT (OUTPATIENT)
Dept: HEMATOLOGY/ONCOLOGY | Facility: CLINIC | Age: 70
End: 2020-02-19
Payer: COMMERCIAL

## 2020-02-19 ENCOUNTER — HOSPITAL ENCOUNTER (OUTPATIENT)
Dept: RADIOLOGY | Facility: HOSPITAL | Age: 70
Discharge: HOME | End: 2020-02-19
Attending: NURSE PRACTITIONER
Payer: COMMERCIAL

## 2020-02-19 VITALS
DIASTOLIC BLOOD PRESSURE: 69 MMHG | RESPIRATION RATE: 18 BRPM | OXYGEN SATURATION: 91 % | SYSTOLIC BLOOD PRESSURE: 111 MMHG | TEMPERATURE: 98 F | HEART RATE: 85 BPM | HEIGHT: 69 IN | WEIGHT: 293 LBS | BODY MASS INDEX: 43.4 KG/M2

## 2020-02-19 DIAGNOSIS — Z85.3 ENCOUNTER FOR FOLLOW-UP SURVEILLANCE OF BREAST CANCER: ICD-10-CM

## 2020-02-19 DIAGNOSIS — Z17.0 MALIGNANT NEOPLASM OF UPPER-OUTER QUADRANT OF RIGHT BREAST IN FEMALE, ESTROGEN RECEPTOR POSITIVE: Primary | Chronic | ICD-10-CM

## 2020-02-19 DIAGNOSIS — G89.29 CHRONIC RIGHT SHOULDER PAIN: ICD-10-CM

## 2020-02-19 DIAGNOSIS — Z08 ENCOUNTER FOR FOLLOW-UP SURVEILLANCE OF BREAST CANCER: ICD-10-CM

## 2020-02-19 DIAGNOSIS — M25.511 CHRONIC RIGHT SHOULDER PAIN: ICD-10-CM

## 2020-02-19 DIAGNOSIS — G89.29 CHRONIC PAIN OF BREAST: ICD-10-CM

## 2020-02-19 DIAGNOSIS — C50.411 MALIGNANT NEOPLASM OF UPPER-OUTER QUADRANT OF RIGHT BREAST IN FEMALE, ESTROGEN RECEPTOR POSITIVE: Primary | Chronic | ICD-10-CM

## 2020-02-19 DIAGNOSIS — N64.4 CHRONIC PAIN OF BREAST: ICD-10-CM

## 2020-02-19 PROCEDURE — 73030 XR SHOULDER COMPLETE 2 OR MORE VIEWS RIGHT: ICD-10-PCS | Mod: 26,RT,, | Performed by: RADIOLOGY

## 2020-02-19 PROCEDURE — 73030 X-RAY EXAM OF SHOULDER: CPT | Mod: TC,RT

## 2020-02-19 PROCEDURE — 73030 X-RAY EXAM OF SHOULDER: CPT | Mod: 26,RT,, | Performed by: RADIOLOGY

## 2020-02-19 PROCEDURE — 3078F DIAST BP <80 MM HG: CPT | Mod: CPTII,S$GLB,, | Performed by: NURSE PRACTITIONER

## 2020-02-19 PROCEDURE — 1159F MED LIST DOCD IN RCRD: CPT | Mod: S$GLB,,, | Performed by: NURSE PRACTITIONER

## 2020-02-19 PROCEDURE — 1159F PR MEDICATION LIST DOCUMENTED IN MEDICAL RECORD: ICD-10-PCS | Mod: S$GLB,,, | Performed by: NURSE PRACTITIONER

## 2020-02-19 PROCEDURE — 99214 PR OFFICE/OUTPT VISIT, EST, LEVL IV, 30-39 MIN: ICD-10-PCS | Mod: S$GLB,,, | Performed by: NURSE PRACTITIONER

## 2020-02-19 PROCEDURE — 1101F PT FALLS ASSESS-DOCD LE1/YR: CPT | Mod: CPTII,S$GLB,, | Performed by: NURSE PRACTITIONER

## 2020-02-19 PROCEDURE — 1125F AMNT PAIN NOTED PAIN PRSNT: CPT | Mod: S$GLB,,, | Performed by: NURSE PRACTITIONER

## 2020-02-19 PROCEDURE — 1101F PR PT FALLS ASSESS DOC 0-1 FALLS W/OUT INJ PAST YR: ICD-10-PCS | Mod: CPTII,S$GLB,, | Performed by: NURSE PRACTITIONER

## 2020-02-19 PROCEDURE — 1125F PR PAIN SEVERITY QUANTIFIED, PAIN PRESENT: ICD-10-PCS | Mod: S$GLB,,, | Performed by: NURSE PRACTITIONER

## 2020-02-19 PROCEDURE — 99999 PR PBB SHADOW E&M-EST. PATIENT-LVL IV: CPT | Mod: PBBFAC,,, | Performed by: NURSE PRACTITIONER

## 2020-02-19 PROCEDURE — 3074F SYST BP LT 130 MM HG: CPT | Mod: CPTII,S$GLB,, | Performed by: NURSE PRACTITIONER

## 2020-02-19 PROCEDURE — 3078F PR MOST RECENT DIASTOLIC BLOOD PRESSURE < 80 MM HG: ICD-10-PCS | Mod: CPTII,S$GLB,, | Performed by: NURSE PRACTITIONER

## 2020-02-19 PROCEDURE — 99214 OFFICE O/P EST MOD 30 MIN: CPT | Mod: S$GLB,,, | Performed by: NURSE PRACTITIONER

## 2020-02-19 PROCEDURE — 99999 PR PBB SHADOW E&M-EST. PATIENT-LVL IV: ICD-10-PCS | Mod: PBBFAC,,, | Performed by: NURSE PRACTITIONER

## 2020-02-19 PROCEDURE — 3074F PR MOST RECENT SYSTOLIC BLOOD PRESSURE < 130 MM HG: ICD-10-PCS | Mod: CPTII,S$GLB,, | Performed by: NURSE PRACTITIONER

## 2020-02-20 ENCOUNTER — OFFICE VISIT (OUTPATIENT)
Dept: PAIN MEDICINE | Facility: CLINIC | Age: 70
End: 2020-02-20
Payer: COMMERCIAL

## 2020-02-20 ENCOUNTER — OFFICE VISIT (OUTPATIENT)
Dept: SURGERY | Facility: CLINIC | Age: 70
End: 2020-02-20
Payer: COMMERCIAL

## 2020-02-20 VITALS
HEIGHT: 69 IN | HEART RATE: 70 BPM | SYSTOLIC BLOOD PRESSURE: 139 MMHG | BODY MASS INDEX: 43.4 KG/M2 | WEIGHT: 293 LBS | DIASTOLIC BLOOD PRESSURE: 90 MMHG | TEMPERATURE: 98 F

## 2020-02-20 VITALS
BODY MASS INDEX: 43.4 KG/M2 | HEART RATE: 57 BPM | WEIGHT: 293 LBS | HEIGHT: 69 IN | SYSTOLIC BLOOD PRESSURE: 117 MMHG | DIASTOLIC BLOOD PRESSURE: 72 MMHG

## 2020-02-20 DIAGNOSIS — M25.511 RIGHT SHOULDER PAIN, UNSPECIFIED CHRONICITY: Primary | ICD-10-CM

## 2020-02-20 DIAGNOSIS — G89.29 CHRONIC PAIN OF BREAST: ICD-10-CM

## 2020-02-20 DIAGNOSIS — M17.0 PRIMARY OSTEOARTHRITIS OF BOTH KNEES: ICD-10-CM

## 2020-02-20 DIAGNOSIS — N64.4 PAIN OF RIGHT BREAST: Primary | ICD-10-CM

## 2020-02-20 DIAGNOSIS — N64.4 CHRONIC PAIN OF BREAST: ICD-10-CM

## 2020-02-20 DIAGNOSIS — N64.4 PAIN OF RIGHT BREAST: ICD-10-CM

## 2020-02-20 PROCEDURE — 1101F PR PT FALLS ASSESS DOC 0-1 FALLS W/OUT INJ PAST YR: ICD-10-PCS | Mod: CPTII,S$GLB,, | Performed by: SURGERY

## 2020-02-20 PROCEDURE — 1159F PR MEDICATION LIST DOCUMENTED IN MEDICAL RECORD: ICD-10-PCS | Mod: S$GLB,,, | Performed by: SURGERY

## 2020-02-20 PROCEDURE — 99999 PR PBB SHADOW E&M-EST. PATIENT-LVL III: ICD-10-PCS | Mod: PBBFAC,,, | Performed by: SURGERY

## 2020-02-20 PROCEDURE — 1125F PR PAIN SEVERITY QUANTIFIED, PAIN PRESENT: ICD-10-PCS | Mod: S$GLB,,, | Performed by: SURGERY

## 2020-02-20 PROCEDURE — 3080F DIAST BP >= 90 MM HG: CPT | Mod: CPTII,S$GLB,, | Performed by: SURGERY

## 2020-02-20 PROCEDURE — 99999 PR PBB SHADOW E&M-EST. PATIENT-LVL III: ICD-10-PCS | Mod: PBBFAC,,, | Performed by: PAIN MEDICINE

## 2020-02-20 PROCEDURE — 3080F PR MOST RECENT DIASTOLIC BLOOD PRESSURE >= 90 MM HG: ICD-10-PCS | Mod: CPTII,S$GLB,, | Performed by: SURGERY

## 2020-02-20 PROCEDURE — 99213 OFFICE O/P EST LOW 20 MIN: CPT | Mod: S$GLB,,, | Performed by: SURGERY

## 2020-02-20 PROCEDURE — 3075F PR MOST RECENT SYSTOLIC BLOOD PRESS GE 130-139MM HG: ICD-10-PCS | Mod: CPTII,S$GLB,, | Performed by: SURGERY

## 2020-02-20 PROCEDURE — 99244 OFF/OP CNSLTJ NEW/EST MOD 40: CPT | Mod: S$GLB,,, | Performed by: PAIN MEDICINE

## 2020-02-20 PROCEDURE — 99213 PR OFFICE/OUTPT VISIT, EST, LEVL III, 20-29 MIN: ICD-10-PCS | Mod: S$GLB,,, | Performed by: SURGERY

## 2020-02-20 PROCEDURE — 99999 PR PBB SHADOW E&M-EST. PATIENT-LVL III: CPT | Mod: PBBFAC,,, | Performed by: PAIN MEDICINE

## 2020-02-20 PROCEDURE — 1125F AMNT PAIN NOTED PAIN PRSNT: CPT | Mod: S$GLB,,, | Performed by: SURGERY

## 2020-02-20 PROCEDURE — 99999 PR PBB SHADOW E&M-EST. PATIENT-LVL III: CPT | Mod: PBBFAC,,, | Performed by: SURGERY

## 2020-02-20 PROCEDURE — 3075F SYST BP GE 130 - 139MM HG: CPT | Mod: CPTII,S$GLB,, | Performed by: SURGERY

## 2020-02-20 PROCEDURE — 1159F MED LIST DOCD IN RCRD: CPT | Mod: S$GLB,,, | Performed by: SURGERY

## 2020-02-20 PROCEDURE — 1101F PT FALLS ASSESS-DOCD LE1/YR: CPT | Mod: CPTII,S$GLB,, | Performed by: SURGERY

## 2020-02-20 PROCEDURE — 99244 PR OFFICE CONSULTATION,LEVEL IV: ICD-10-PCS | Mod: S$GLB,,, | Performed by: PAIN MEDICINE

## 2020-02-20 RX ORDER — GABAPENTIN 600 MG/1
600 TABLET ORAL 3 TIMES DAILY
Qty: 90 TABLET | Refills: 2 | Status: SHIPPED | OUTPATIENT
Start: 2020-02-20 | End: 2020-03-21

## 2020-02-20 RX ORDER — OXYCODONE AND ACETAMINOPHEN 7.5; 325 MG/1; MG/1
1 TABLET ORAL EVERY 8 HOURS PRN
Qty: 30 TABLET | Refills: 0 | Status: SHIPPED | OUTPATIENT
Start: 2020-02-20 | End: 2020-03-31 | Stop reason: SDUPTHER

## 2020-02-20 RX ORDER — NAPROXEN 500 MG/1
500 TABLET ORAL 2 TIMES DAILY WITH MEALS
Qty: 60 TABLET | Refills: 2 | Status: SHIPPED | OUTPATIENT
Start: 2020-02-20 | End: 2020-11-02

## 2020-02-20 NOTE — PROGRESS NOTES
Chief Pain Complaint:  Consult and Breast Pain    This note was created using voice recognition, there may be errors that were missed during proofreading.    History of Present Illness:   This patient is a 70 y.o. female who presents today complaining of the above noted pain/s. The patient describes the pain as follows.  Ms. Luther is a new patient clinic with complaints of right-sided breast pain.  She has a history of right breast cancer and has undergone along back to me however she finds that she has very intense pain in the right breast especially the nipple.  She feels like the nipple is going to fall off.  She has been told that based on MRI results there is nerve damage in the right breast.  Today she rates her pain as a 9.5/10.  She has been prescribed numerous medications in the past including tramadol which was not helpful morphine 15 mg immediate release which is helpful, naproxen 500 mg which has been helpful and Percocet 7.5/325 mg tablets.  She also takes gabapentin which she has found be helpful.  She has not tried topical agents applied to the breast.  She does report having quite a bit of anxiety regarding her condition and she is afraid that she leans too much on her  and is putting him under lock stress.  She does meet with psychiatry and has discussed this previously and has an appointment coming up approximately 1 week.    Previous Therapy:  Medications:  Tramadol, Percocet, morphine, naproxen, gabapentin  Injections: None  Surgeries:  Right breast lumpectomy  Physical Therapy: None    Past Surgical History:   Procedure Laterality Date    APPENDECTOMY      BREAST BIOPSY      2019    BREAST LUMPECTOMY      2019     SECTION      x 1    OOPHORECTOMY      right     SENTINEL LYMPH NODE BIOPSY Right 3/27/2019    Procedure: BIOPSY, LYMPH NODE, SENTINEL;  Surgeon: Artemio Starks MD;  Location: [facility];  Service: General;  Laterality: Right;       Family History   Problem  Relation Age of Onset    Diabetes Maternal Grandmother     Diabetes Maternal Grandfather     Diabetes Mother     Breast cancer Neg Hx     Colon cancer Neg Hx     Ovarian cancer Neg Hx        Social History     Socioeconomic History    Marital status:      Spouse name: Campos Luther    Number of children: 2    Years of education: Not on file    Highest education level: Not on file   Occupational History    Not on file   Social Needs    Financial resource strain: Not on file    Food insecurity:     Worry: Not on file     Inability: Not on file    Transportation needs:     Medical: Not on file     Non-medical: Not on file   Tobacco Use    Smoking status: Never Smoker    Smokeless tobacco: Never Used   Substance and Sexual Activity    Alcohol use: No    Drug use: No    Sexual activity: Yes     Partners: Male     Birth control/protection: Post-menopausal   Lifestyle    Physical activity:     Days per week: Not on file     Minutes per session: Not on file    Stress: Not on file   Relationships    Social connections:     Talks on phone: Not on file     Gets together: Not on file     Attends Restorationist service: Not on file     Active member of club or organization: Not on file     Attends meetings of clubs or organizations: Not on file     Relationship status: Not on file   Other Topics Concern    Patient feels they ought to cut down on drinking/drug use Not Asked    Patient annoyed by others criticizing their drinking/drug use Not Asked    Patient has felt bad or guilty about drinking/drug use Not Asked    Patient has had a drink/used drugs as an eye opener in the AM Not Asked   Social History Narrative    Not on file      Imaging / Labs / Studies (reviewed on 2/20/2020):    Review of Systems:  Review of Systems   Constitutional: Negative for fever.   Eyes: Negative for blurred vision.   Respiratory: Negative for cough and wheezing.    Cardiovascular: Negative for chest pain and  "orthopnea.   Gastrointestinal: Negative for constipation, diarrhea, nausea and vomiting.   Genitourinary: Negative for dysuria.   Musculoskeletal:        Right breast pain   Skin: Negative for itching and rash.   Endo/Heme/Allergies: Does not bruise/bleed easily.       Physical Exam:  /72   Pulse (!) 57   Ht 5' 9" (1.753 m)   Wt 135.5 kg (298 lb 9.8 oz)   LMP  (LMP Unknown)   BMI 44.10 kg/m²  (reviewed on 2/20/2020)\  General    Constitutional: She is oriented to person, place, and time. She appears well-developed and well-nourished.   HENT:   Head: Normocephalic and atraumatic.   Eyes: EOM are normal.   Neck: Neck supple.   Cardiovascular: Normal rate.    Pulmonary/Chest: Effort normal.   Abdominal: She exhibits no distension.   Neurological: She is alert and oriented to person, place, and time. No cranial nerve deficit.   Psychiatric: She has a normal mood and affect.               Assessment  Right breast pain    1. 70 y.o. year old patient with PMH of   Past Medical History:   Diagnosis Date    Encounter for blood transfusion     Hx of psychiatric care     Hypertension     Major depressive disorder, single episode, moderate with anxious distress 1/14/2020    Malignant neoplasm of upper-outer quadrant of right breast in female, estrogen receptor positive 3/14/2019    radiation    Psychiatric problem     Sleep difficulties     Stroke 2009    no residual defect    Therapy        presenting with pain located right breast  2. Pain Generators / Etiology:  Breast cancer, lumpectomy  3. Failed Meds (E- Effective, NE- Not Effective):  Gabapentin-minimally effective, naproxen-minimally effective, Morphine-been effective, Tramadol-not effective  4. Physical Therapy - None  5. Psychological comorbidities - None  6. Anticoagulants / Antiplatelets: Aspirin 81mg     PLAN:  1. Medications:  Will provide prescription for topical compounding cream to apply to right breast; will refill gabapentin increased to " 600 mg 3 times daily; will provide refill on naproxen 500 mg twice daily; continue all other medications as prescribed    2. PT - none  3. Psychological - continue follow-up with psychiatry regarding anxiety  4. Labs - obtain  none  5. Imaging - obtain  none  6. Interventions - schedule none  7. Referrals - none  8. Records - none  9. Follow up visit - follow up in clinic in 6 weeks  10. Patient Questions - answered all of the patient's questions regarding diagnosis, therapy, and treatment  11.  This condition does not require this patient to take time off of work    YOLI Pina MD  Interventional Pain  [facility] [place]

## 2020-02-20 NOTE — PATIENT INSTRUCTIONS
-provided prescription for topical compounding cream to apply right breast  -will increase gabapentin 600 mg 3 times daily  -will start naproxen 500 mg twice daily  -continue all other medications as prescribed  -follow up in clinic in 6 weeks

## 2020-02-20 NOTE — LETTER
February 20, 2020      Artemio Starks MD  [address]  [place] [address]           [facility]  [address] [address]  Phone: [phone]  Fax: [phone]          Patient: Susu Luther   MR Number: [id number]   YOB: 1950   Date of Visit: 2/20/2020       Dear Dr. Artemio tSarks:    Thank you for referring Susu Luther to me for evaluation. Attached you will find relevant portions of my assessment and plan of care.    If you have questions, please do not hesitate to call me. I look forward to following Susu Luther along with you.    Sincerely,    Noah Pina MD    Enclosure  CC:  No Recipients    If you would like to receive this communication electronically, please contact [email or web address] or [phone] to request more information on [employer] Link access.    For providers and/or their staff who would like to refer a patient to [facility], please contact us through our one-stop-shop provider referral line, [facility], at [phone].    If you feel you have received this communication in error or would no longer like to receive these types of communications, please e-mail [email or web address]

## 2020-02-20 NOTE — LETTER
February 20, 2020      Lili Burks NP  [address]  [place] [address]            [facility]  [address]  [facility] [address]  Phone: [phone]  Fax: [phone]          Patient: Susu Luther   MR Number: [id number]   YOB: 1950   Date of Visit: 2/20/2020       Dear Lili Burks:    Thank you for referring Susu Luther to me for evaluation. Attached you will find relevant portions of my assessment and plan of care.    If you have questions, please do not hesitate to call me. I look forward to following Susu Luther along with you.    Sincerely,    Artemio Starks MD    Enclosure  CC:  No Recipients    If you would like to receive this communication electronically, please contact [email or web address] or [phone] to request more information on [employer] Link access.    For providers and/or their staff who would like to refer a patient to [facility], please contact us through our one-stop-shop provider referral line, Erika Youssef, at [phone].    If you feel you have received this communication in error or would no longer like to receive these types of communications, please e-mail [email or web address]

## 2020-02-20 NOTE — PROGRESS NOTES
Subjective:       Patient ID: Susu Luther is a 70 y.o. female.    Chronic right breast pain    Chief Complaint: Follow-up (breast pain/tenderness after surgery)    Patient is status post lumpectomy sentinel node biopsy and radiation.  After her radiation she developed chronic pain of the right breast.  She states that her breast hurts all the time.  The nipple is very sensitive.  Anything that touches her breast can cause pain.    The pain is described as a sharp to shooting pain.  It could also be a burning pain.    The patient also has a yellowish nipple discharge.    A recent MRI showed no abnormalities.    She also complains of left shoulder pain for which x-rays were normal and orthopedic appointment/evaluation is pending      Patient has tried Neurontin with no significant improvement however she did not continue with dose escalation.  Lighted derm or lidocaine patches have helped.  She did not really try a Jefferson 2 inhibitor.    Review of Systems   Skin:        Right breast pain       Objective:      Physical Exam   Constitutional:   Obese   Neck: Normal range of motion. Neck supple.   Cardiovascular: Normal rate and regular rhythm.   Pulmonary/Chest: Effort normal and breath sounds normal.   Abdominal: Soft. Bowel sounds are normal.   Obese   Skin:   Right breast shows hyperpigmentation from radiation.  This slight retraction of the nipple.  There is sensitivity of the right breast to touch of the skin in touch of the nipple area or area.  There is significant pain with palpation.  She could not tolerate expression of a discharge from the nipple.  There is no axillary adenopathy   Vitals reviewed.        MRI was reviewed   Assessment:       1. Pain of right breast        Plan:       A trial of Percocet 7.5 mg.    Referral pain management for further evaluation and management of chronic right breast pain.

## 2020-02-20 NOTE — PATIENT INSTRUCTIONS
The type of breast pain that you have can be very difficult to treat.    We will ask her to see a pain management doctor to discuss a regime of medications that may be helpful.  We can try using some narcotics however we 1 we will avoid these for long-term pain control if possible.    We could consider a mastectomy, however remove of your breast does not guarantee that the pain will go away because we keep the skin and the nerves of the skin could be the ones that are sensitized.

## 2020-02-21 ENCOUNTER — TELEPHONE (OUTPATIENT)
Dept: PHARMACY | Facility: CLINIC | Age: 70
End: 2020-02-21

## 2020-02-21 ENCOUNTER — TELEPHONE (OUTPATIENT)
Dept: ORTHOPEDICS | Facility: CLINIC | Age: 70
End: 2020-02-21

## 2020-02-21 NOTE — TELEPHONE ENCOUNTER
Refill call regarding Femara at [facility]. Will prepare for shipment with consent of patient on  to arrive . Copay 7.50 CCOF ending in 3104. Patient has not started any new medications including OTC drugs. Patient has not had any medication/ dose or instruction changes. No new allergies or side effects reported with this shipment. Medication is being taken as prescribed by physician and properly stored. Two patient identifiers:  and Address verified. Patient has 10 days on hand.

## 2020-02-21 NOTE — TELEPHONE ENCOUNTER
Spoke w/ patient to confirm appt. Patient states that she is unable to make this appt due to personal reasons. Offered patient to r/s . Patient agreed. Patient is r/s to 2/26. Patient verbalized understanding and was grateful for the call-DD

## 2020-03-03 ENCOUNTER — OFFICE VISIT (OUTPATIENT)
Dept: INTERNAL MEDICINE | Facility: CLINIC | Age: 70
End: 2020-03-03
Payer: COMMERCIAL

## 2020-03-03 ENCOUNTER — LAB VISIT (OUTPATIENT)
Dept: LAB | Facility: HOSPITAL | Age: 70
End: 2020-03-03
Attending: FAMILY MEDICINE
Payer: COMMERCIAL

## 2020-03-03 VITALS
SYSTOLIC BLOOD PRESSURE: 138 MMHG | OXYGEN SATURATION: 97 % | HEIGHT: 69 IN | HEART RATE: 64 BPM | WEIGHT: 293 LBS | BODY MASS INDEX: 43.4 KG/M2 | TEMPERATURE: 98 F | DIASTOLIC BLOOD PRESSURE: 88 MMHG

## 2020-03-03 DIAGNOSIS — I10 ESSENTIAL HYPERTENSION: ICD-10-CM

## 2020-03-03 DIAGNOSIS — H53.8 BLURRY VISION: ICD-10-CM

## 2020-03-03 DIAGNOSIS — R51.9 NONINTRACTABLE HEADACHE, UNSPECIFIED CHRONICITY PATTERN, UNSPECIFIED HEADACHE TYPE: Primary | ICD-10-CM

## 2020-03-03 PROBLEM — R47.9 SPEECH DISTURBANCE: Status: ACTIVE | Noted: 2020-03-03

## 2020-03-03 PROCEDURE — 3079F PR MOST RECENT DIASTOLIC BLOOD PRESSURE 80-89 MM HG: ICD-10-PCS | Mod: CPTII,S$GLB,, | Performed by: FAMILY MEDICINE

## 2020-03-03 PROCEDURE — 1159F MED LIST DOCD IN RCRD: CPT | Mod: S$GLB,,, | Performed by: FAMILY MEDICINE

## 2020-03-03 PROCEDURE — 1159F PR MEDICATION LIST DOCUMENTED IN MEDICAL RECORD: ICD-10-PCS | Mod: S$GLB,,, | Performed by: FAMILY MEDICINE

## 2020-03-03 PROCEDURE — 1126F PR PAIN SEVERITY QUANTIFIED, NO PAIN PRESENT: ICD-10-PCS | Mod: S$GLB,,, | Performed by: FAMILY MEDICINE

## 2020-03-03 PROCEDURE — 1101F PR PT FALLS ASSESS DOC 0-1 FALLS W/OUT INJ PAST YR: ICD-10-PCS | Mod: CPTII,S$GLB,, | Performed by: FAMILY MEDICINE

## 2020-03-03 PROCEDURE — 99214 PR OFFICE/OUTPT VISIT, EST, LEVL IV, 30-39 MIN: ICD-10-PCS | Mod: S$GLB,,, | Performed by: FAMILY MEDICINE

## 2020-03-03 PROCEDURE — 80053 COMPREHEN METABOLIC PANEL: CPT

## 2020-03-03 PROCEDURE — 3075F PR MOST RECENT SYSTOLIC BLOOD PRESS GE 130-139MM HG: ICD-10-PCS | Mod: CPTII,S$GLB,, | Performed by: FAMILY MEDICINE

## 2020-03-03 PROCEDURE — 99999 PR PBB SHADOW E&M-EST. PATIENT-LVL IV: CPT | Mod: PBBFAC,,, | Performed by: FAMILY MEDICINE

## 2020-03-03 PROCEDURE — 99214 OFFICE O/P EST MOD 30 MIN: CPT | Mod: S$GLB,,, | Performed by: FAMILY MEDICINE

## 2020-03-03 PROCEDURE — 36415 COLL VENOUS BLD VENIPUNCTURE: CPT

## 2020-03-03 PROCEDURE — 3075F SYST BP GE 130 - 139MM HG: CPT | Mod: CPTII,S$GLB,, | Performed by: FAMILY MEDICINE

## 2020-03-03 PROCEDURE — 99999 PR PBB SHADOW E&M-EST. PATIENT-LVL IV: ICD-10-PCS | Mod: PBBFAC,,, | Performed by: FAMILY MEDICINE

## 2020-03-03 PROCEDURE — 1101F PT FALLS ASSESS-DOCD LE1/YR: CPT | Mod: CPTII,S$GLB,, | Performed by: FAMILY MEDICINE

## 2020-03-03 PROCEDURE — 1126F AMNT PAIN NOTED NONE PRSNT: CPT | Mod: S$GLB,,, | Performed by: FAMILY MEDICINE

## 2020-03-03 PROCEDURE — 3079F DIAST BP 80-89 MM HG: CPT | Mod: CPTII,S$GLB,, | Performed by: FAMILY MEDICINE

## 2020-03-03 RX ORDER — LIDOCAINE AND PRILOCAINE 25; 25 MG/G; MG/G
CREAM TOPICAL
COMMUNITY
Start: 2020-02-20 | End: 2020-12-28

## 2020-03-03 NOTE — PROGRESS NOTES
Subjective:       Patient ID: Susu Luther is a 70 y.o. female.    Chief Complaint: Headache    Pt reports headaches that have been worsening with blurry vision off and on since Dec. Pt reports that she has been having up and down blood pressure. Pt has been having BP over 160    Review of Systems   HENT: Negative.    Cardiovascular: Negative.    Genitourinary: Negative.    Neurological: Negative.    Hematological: Negative.        Objective:      Physical Exam   Constitutional: She is oriented to person, place, and time. She appears well-developed and well-nourished.   Cardiovascular: Normal rate and regular rhythm. Exam reveals no friction rub.   No murmur heard.  Pulmonary/Chest: Effort normal and breath sounds normal. No stridor. She has no wheezes.   Abdominal: Soft. Bowel sounds are normal. There is no tenderness. There is no guarding.   Neurological: She is alert and oriented to person, place, and time.   Skin: Skin is warm and dry.       Assessment:       1. Nonintractable headache, unspecified chronicity pattern, unspecified headache type    2. Essential hypertension    3. Blurry vision        Plan:       Nonintractable headache, unspecified chronicity pattern, unspecified headache type  Comments:  Headache may be due to BP but other issues may be contributing. Pt has had multiple Ct all of been normal for same symptoms  Orders:  -     Ambulatory referral/consult to Optometry; Future; Expected date: 03/10/2020    Essential hypertension  Comments:  BP is good now but has been elevated. Will increase the clonidine to 2 morning and night and 1 at noon  Orders:  -     Comprehensive metabolic panel; Future; Expected date: 03/03/2020    Blurry vision  Comments:  Will send to opto

## 2020-03-04 LAB
ALBUMIN SERPL BCP-MCNC: 3.1 G/DL (ref 3.5–5.2)
ALP SERPL-CCNC: 100 U/L (ref 55–135)
ALT SERPL W/O P-5'-P-CCNC: 12 U/L (ref 10–44)
ANION GAP SERPL CALC-SCNC: 8 MMOL/L (ref 8–16)
AST SERPL-CCNC: 19 U/L (ref 10–40)
BILIRUB SERPL-MCNC: 0.3 MG/DL (ref 0.1–1)
BUN SERPL-MCNC: 22 MG/DL (ref 8–23)
CALCIUM SERPL-MCNC: 9.8 MG/DL (ref 8.7–10.5)
CHLORIDE SERPL-SCNC: 109 MMOL/L (ref 95–110)
CO2 SERPL-SCNC: 25 MMOL/L (ref 23–29)
CREAT SERPL-MCNC: 1.4 MG/DL (ref 0.5–1.4)
EST. GFR  (AFRICAN AMERICAN): 44.5 ML/MIN/1.73 M^2
EST. GFR  (NON AFRICAN AMERICAN): 38.6 ML/MIN/1.73 M^2
GLUCOSE SERPL-MCNC: 90 MG/DL (ref 70–110)
POTASSIUM SERPL-SCNC: 4.3 MMOL/L (ref 3.5–5.1)
PROT SERPL-MCNC: 7.3 G/DL (ref 6–8.4)
SODIUM SERPL-SCNC: 142 MMOL/L (ref 136–145)

## 2020-03-10 ENCOUNTER — TELEPHONE (OUTPATIENT)
Dept: PHARMACY | Facility: CLINIC | Age: 70
End: 2020-03-10

## 2020-03-11 ENCOUNTER — OFFICE VISIT (OUTPATIENT)
Dept: INTERNAL MEDICINE | Facility: CLINIC | Age: 70
End: 2020-03-11
Payer: COMMERCIAL

## 2020-03-11 VITALS
SYSTOLIC BLOOD PRESSURE: 114 MMHG | HEART RATE: 62 BPM | TEMPERATURE: 98 F | OXYGEN SATURATION: 98 % | WEIGHT: 293 LBS | BODY MASS INDEX: 43.4 KG/M2 | DIASTOLIC BLOOD PRESSURE: 86 MMHG | HEIGHT: 69 IN

## 2020-03-11 DIAGNOSIS — Z17.0 MALIGNANT NEOPLASM OF UPPER-OUTER QUADRANT OF RIGHT BREAST IN FEMALE, ESTROGEN RECEPTOR POSITIVE: Chronic | ICD-10-CM

## 2020-03-11 DIAGNOSIS — I10 ESSENTIAL HYPERTENSION: Primary | ICD-10-CM

## 2020-03-11 DIAGNOSIS — C50.411 MALIGNANT NEOPLASM OF UPPER-OUTER QUADRANT OF RIGHT BREAST IN FEMALE, ESTROGEN RECEPTOR POSITIVE: Chronic | ICD-10-CM

## 2020-03-11 DIAGNOSIS — F32.1 MAJOR DEPRESSIVE DISORDER, SINGLE EPISODE, MODERATE WITH ANXIOUS DISTRESS: ICD-10-CM

## 2020-03-11 PROCEDURE — 99214 PR OFFICE/OUTPT VISIT, EST, LEVL IV, 30-39 MIN: ICD-10-PCS | Mod: S$GLB,,, | Performed by: FAMILY MEDICINE

## 2020-03-11 PROCEDURE — 3079F PR MOST RECENT DIASTOLIC BLOOD PRESSURE 80-89 MM HG: ICD-10-PCS | Mod: CPTII,S$GLB,, | Performed by: FAMILY MEDICINE

## 2020-03-11 PROCEDURE — 3079F DIAST BP 80-89 MM HG: CPT | Mod: CPTII,S$GLB,, | Performed by: FAMILY MEDICINE

## 2020-03-11 PROCEDURE — 1159F PR MEDICATION LIST DOCUMENTED IN MEDICAL RECORD: ICD-10-PCS | Mod: S$GLB,,, | Performed by: FAMILY MEDICINE

## 2020-03-11 PROCEDURE — 1101F PT FALLS ASSESS-DOCD LE1/YR: CPT | Mod: CPTII,S$GLB,, | Performed by: FAMILY MEDICINE

## 2020-03-11 PROCEDURE — 99999 PR PBB SHADOW E&M-EST. PATIENT-LVL III: ICD-10-PCS | Mod: PBBFAC,,, | Performed by: FAMILY MEDICINE

## 2020-03-11 PROCEDURE — 3074F PR MOST RECENT SYSTOLIC BLOOD PRESSURE < 130 MM HG: ICD-10-PCS | Mod: CPTII,S$GLB,, | Performed by: FAMILY MEDICINE

## 2020-03-11 PROCEDURE — 99999 PR PBB SHADOW E&M-EST. PATIENT-LVL III: CPT | Mod: PBBFAC,,, | Performed by: FAMILY MEDICINE

## 2020-03-11 PROCEDURE — 99214 OFFICE O/P EST MOD 30 MIN: CPT | Mod: S$GLB,,, | Performed by: FAMILY MEDICINE

## 2020-03-11 PROCEDURE — 1101F PR PT FALLS ASSESS DOC 0-1 FALLS W/OUT INJ PAST YR: ICD-10-PCS | Mod: CPTII,S$GLB,, | Performed by: FAMILY MEDICINE

## 2020-03-11 PROCEDURE — 1125F PR PAIN SEVERITY QUANTIFIED, PAIN PRESENT: ICD-10-PCS | Mod: S$GLB,,, | Performed by: FAMILY MEDICINE

## 2020-03-11 PROCEDURE — 1159F MED LIST DOCD IN RCRD: CPT | Mod: S$GLB,,, | Performed by: FAMILY MEDICINE

## 2020-03-11 PROCEDURE — 3074F SYST BP LT 130 MM HG: CPT | Mod: CPTII,S$GLB,, | Performed by: FAMILY MEDICINE

## 2020-03-11 PROCEDURE — 1125F AMNT PAIN NOTED PAIN PRSNT: CPT | Mod: S$GLB,,, | Performed by: FAMILY MEDICINE

## 2020-03-11 RX ORDER — BUTALBITAL, ACETAMINOPHEN AND CAFFEINE 50; 325; 40 MG/1; MG/1; MG/1
1 TABLET ORAL EVERY 4 HOURS PRN
Qty: 15 TABLET | Refills: 0 | Status: SHIPPED | OUTPATIENT
Start: 2020-03-11 | End: 2020-03-16 | Stop reason: SDUPTHER

## 2020-03-11 RX ORDER — BUTALBITAL, ACETAMINOPHEN AND CAFFEINE 50; 325; 40 MG/1; MG/1; MG/1
1 CAPSULE ORAL 3 TIMES DAILY PRN
Qty: 60 CAPSULE | Refills: 0 | OUTPATIENT
Start: 2020-03-11

## 2020-03-11 RX ORDER — GABAPENTIN 400 MG/1
CAPSULE ORAL
COMMUNITY
Start: 2020-03-09 | End: 2020-06-24 | Stop reason: DRUGHIGH

## 2020-03-11 RX ORDER — DIAZEPAM 10 MG/1
10 TABLET ORAL 2 TIMES DAILY PRN
Qty: 60 TABLET | Refills: 1 | Status: SHIPPED | OUTPATIENT
Start: 2020-03-11 | End: 2020-03-31 | Stop reason: SDUPTHER

## 2020-03-11 RX ORDER — VENLAFAXINE HYDROCHLORIDE 75 MG/1
CAPSULE, EXTENDED RELEASE ORAL
COMMUNITY
Start: 2020-03-09 | End: 2020-03-25

## 2020-03-11 RX ORDER — HYDRALAZINE HYDROCHLORIDE 25 MG/1
25 TABLET, FILM COATED ORAL EVERY 8 HOURS
Qty: 90 TABLET | Refills: 1 | Status: SHIPPED | OUTPATIENT
Start: 2020-03-11 | End: 2020-08-06 | Stop reason: SDUPTHER

## 2020-03-11 RX ORDER — HYDROCHLOROTHIAZIDE 12.5 MG/1
CAPSULE ORAL
COMMUNITY
Start: 2020-03-09 | End: 2020-08-25

## 2020-03-11 NOTE — PROGRESS NOTES
Subjective:       Patient ID: Susu Luther is a 70 y.o. female.    Chief Complaint: Follow-up    Pt is a 70 year old who is here for follow-up. BP is fluctuating at times in the 150's although very good on presentation at this visit. Other chronic conditions are stable at this time    Review of Systems   Constitutional: Negative.    Respiratory: Negative.    Cardiovascular: Negative.    Genitourinary: Negative.    Musculoskeletal: Negative.    Neurological: Negative.    Hematological: Negative.    Psychiatric/Behavioral: Negative.        Objective:      Physical Exam   Constitutional: She is oriented to person, place, and time. She appears well-developed and well-nourished.   Cardiovascular: Normal rate and regular rhythm. Exam reveals no friction rub.   No murmur heard.  Pulmonary/Chest: Effort normal and breath sounds normal. No stridor. She has no wheezes.   Abdominal: Soft. Bowel sounds are normal.   Neurological: She is alert and oriented to person, place, and time.   Skin: Skin is dry.       Assessment:       1. Essential hypertension    2. Malignant neoplasm of upper-outer quadrant of right breast in female, estrogen receptor positive    3. Major depressive disorder, single episode, moderate with anxious distress        Plan:       Essential hypertension  Comments:  Pt BP is well controlled today but will start her on hydralazine as needed    Malignant neoplasm of upper-outer quadrant of right breast in female, estrogen receptor positive  Comments:  Pt continue to see Hem/Onc  Orders:  -     diazePAM (VALIUM) 10 MG Tab; Take 1 tablet (10 mg total) by mouth 2 (two) times daily as needed.  Dispense: 60 tablet; Refill: 1    Major depressive disorder, single episode, moderate with anxious distress  Comments:  stable    Other orders  -     hydrALAZINE (APRESOLINE) 25 MG tablet; Take 1 tablet (25 mg total) by mouth every 8 (eight) hours.  Dispense: 90 tablet; Refill: 1  -      butalbital-acetaminophen-caffeine -40 mg (FIORICET, ESGIC) -40 mg per tablet; Take 1 tablet by mouth every 4 (four) hours as needed for Pain.  Dispense: 15 tablet; Refill: 0

## 2020-03-12 ENCOUNTER — PATIENT OUTREACH (OUTPATIENT)
Dept: ADMINISTRATIVE | Facility: OTHER | Age: 70
End: 2020-03-12

## 2020-03-12 DIAGNOSIS — Z12.11 ENCOUNTER FOR FIT (FECAL IMMUNOCHEMICAL TEST) SCREENING: Primary | ICD-10-CM

## 2020-03-16 RX ORDER — BUTALBITAL, ACETAMINOPHEN AND CAFFEINE 50; 325; 40 MG/1; MG/1; MG/1
1 TABLET ORAL EVERY 4 HOURS PRN
Qty: 30 TABLET | Refills: 0 | Status: SHIPPED | OUTPATIENT
Start: 2020-03-16 | End: 2020-04-15

## 2020-03-23 ENCOUNTER — TELEPHONE (OUTPATIENT)
Dept: PHARMACY | Facility: CLINIC | Age: 70
End: 2020-03-23

## 2020-03-23 NOTE — TELEPHONE ENCOUNTER
Refill call regarding Letrozole at [facility]. Will prepare for shipment with consent of patient on 3/30 to arrive 3/31. Copay 7.50 CCOF ending in 3104 . Patient states that she has fallen 2 times in the past few weeks and not sure if medication causes dizziness. She also states that she is in a lot of pain and can barley move. Patient mentioned not going to [facility] because she feels as if they have way more important things to worry about. I immediately transferred patient to [facility] for consultation. Completed refill with patient not starting any new medication. She may have missed a dose but couldn't confirm. No new allergies to report. 2 patient identifies: Name and . Patient states that she has 10 days of medication on hand.

## 2020-03-25 DIAGNOSIS — Z17.0 MALIGNANT NEOPLASM OF UPPER-OUTER QUADRANT OF RIGHT BREAST IN FEMALE, ESTROGEN RECEPTOR POSITIVE: Chronic | ICD-10-CM

## 2020-03-25 DIAGNOSIS — C50.411 MALIGNANT NEOPLASM OF UPPER-OUTER QUADRANT OF RIGHT BREAST IN FEMALE, ESTROGEN RECEPTOR POSITIVE: Chronic | ICD-10-CM

## 2020-03-25 DIAGNOSIS — F32.A DEPRESSION, UNSPECIFIED DEPRESSION TYPE: ICD-10-CM

## 2020-03-25 RX ORDER — BUTALBITAL, ACETAMINOPHEN AND CAFFEINE 50; 325; 40 MG/1; MG/1; MG/1
1 CAPSULE ORAL 3 TIMES DAILY PRN
Qty: 60 CAPSULE | Refills: 0 | Status: SHIPPED | OUTPATIENT
Start: 2020-03-25 | End: 2020-04-27

## 2020-03-25 RX ORDER — VENLAFAXINE HYDROCHLORIDE 150 MG/1
150 CAPSULE, EXTENDED RELEASE ORAL DAILY
Qty: 90 CAPSULE | Refills: 3 | Status: SHIPPED | OUTPATIENT
Start: 2020-03-25 | End: 2020-05-29 | Stop reason: SDUPTHER

## 2020-03-25 NOTE — TELEPHONE ENCOUNTER
Call Attempt #1.  LVM to follow-up with patient regarding symptoms of dizziness, falls, HA, weakness reported earlier in the week.

## 2020-03-25 NOTE — TELEPHONE ENCOUNTER
----- Message from Guido Huynh MD sent at 3/25/2020  9:29 AM CDT -----  Regarding: RE: New symptoms, possible side effects of Femara?  Hi Fam,    Would you please schedule to see me or CELESTINA in clinic.    ----- Message -----  From: Ant Carter PharmD  Sent: 3/23/2020   4:41 PM CDT  To: Antoinette eHrbert, GiovannaD, Mariely Saez, PharmD, #  Subject: New symptoms, possible side effects of Femar#    Good afternoon,    During a routine refill call patient reports severe dizziness (has had falls), headache, fatigue (has stayed in bed all day), and weakness (cannot hold a cup of coffee). She requested guidance from your office. We encouraged patient to seek emergency medication attention if symptoms persist or get worse. Please advise.     Regards,    Ant Carter, PharmD  Clinical Pharmacist  [facility]  P: [phone]

## 2020-03-30 NOTE — TELEPHONE ENCOUNTER
Call attempt #2: LVM to follow up and see how patient is doing. Previously reported sx of dizziness, falls, HA, and weakness. Also seeing if patient has been able to have virtual visit with Dr. Huynh.

## 2020-03-31 ENCOUNTER — TELEPHONE (OUTPATIENT)
Dept: HEMATOLOGY/ONCOLOGY | Facility: CLINIC | Age: 70
End: 2020-03-31

## 2020-03-31 ENCOUNTER — OFFICE VISIT (OUTPATIENT)
Dept: HEMATOLOGY/ONCOLOGY | Facility: CLINIC | Age: 70
End: 2020-03-31
Payer: COMMERCIAL

## 2020-03-31 DIAGNOSIS — F41.9 ANXIETY: ICD-10-CM

## 2020-03-31 DIAGNOSIS — Z17.0 MALIGNANT NEOPLASM OF UPPER-OUTER QUADRANT OF RIGHT BREAST IN FEMALE, ESTROGEN RECEPTOR POSITIVE: Chronic | ICD-10-CM

## 2020-03-31 DIAGNOSIS — G89.29 CHRONIC PAIN OF BREAST: ICD-10-CM

## 2020-03-31 DIAGNOSIS — R42 DIZZINESS: ICD-10-CM

## 2020-03-31 DIAGNOSIS — C50.411 MALIGNANT NEOPLASM OF UPPER-OUTER QUADRANT OF RIGHT BREAST IN FEMALE, ESTROGEN RECEPTOR POSITIVE: Primary | Chronic | ICD-10-CM

## 2020-03-31 DIAGNOSIS — N64.4 CHRONIC PAIN OF BREAST: ICD-10-CM

## 2020-03-31 DIAGNOSIS — C50.411 MALIGNANT NEOPLASM OF UPPER-OUTER QUADRANT OF RIGHT BREAST IN FEMALE, ESTROGEN RECEPTOR POSITIVE: Chronic | ICD-10-CM

## 2020-03-31 DIAGNOSIS — Z17.0 MALIGNANT NEOPLASM OF UPPER-OUTER QUADRANT OF RIGHT BREAST IN FEMALE, ESTROGEN RECEPTOR POSITIVE: Primary | Chronic | ICD-10-CM

## 2020-03-31 PROCEDURE — 99999 PR PBB SHADOW E&M-EST. PATIENT-LVL I: ICD-10-PCS | Mod: PBBFAC,,, | Performed by: INTERNAL MEDICINE

## 2020-03-31 PROCEDURE — 1159F MED LIST DOCD IN RCRD: CPT | Mod: S$GLB,,, | Performed by: INTERNAL MEDICINE

## 2020-03-31 PROCEDURE — 1101F PR PT FALLS ASSESS DOC 0-1 FALLS W/OUT INJ PAST YR: ICD-10-PCS | Mod: CPTII,S$GLB,, | Performed by: INTERNAL MEDICINE

## 2020-03-31 PROCEDURE — 99215 PR OFFICE/OUTPT VISIT, EST, LEVL V, 40-54 MIN: ICD-10-PCS | Mod: S$GLB,,, | Performed by: INTERNAL MEDICINE

## 2020-03-31 PROCEDURE — 1101F PT FALLS ASSESS-DOCD LE1/YR: CPT | Mod: CPTII,S$GLB,, | Performed by: INTERNAL MEDICINE

## 2020-03-31 PROCEDURE — 1159F PR MEDICATION LIST DOCUMENTED IN MEDICAL RECORD: ICD-10-PCS | Mod: S$GLB,,, | Performed by: INTERNAL MEDICINE

## 2020-03-31 PROCEDURE — 99999 PR PBB SHADOW E&M-EST. PATIENT-LVL I: CPT | Mod: PBBFAC,,, | Performed by: INTERNAL MEDICINE

## 2020-03-31 PROCEDURE — 99215 OFFICE O/P EST HI 40 MIN: CPT | Mod: S$GLB,,, | Performed by: INTERNAL MEDICINE

## 2020-03-31 RX ORDER — DIAZEPAM 10 MG/1
10 TABLET ORAL 2 TIMES DAILY PRN
Qty: 60 TABLET | Refills: 1 | Status: SHIPPED | OUTPATIENT
Start: 2020-03-31 | End: 2020-04-08 | Stop reason: SDUPTHER

## 2020-03-31 RX ORDER — SERTRALINE HYDROCHLORIDE 50 MG/1
50 TABLET, FILM COATED ORAL DAILY
Qty: 90 TABLET | Refills: 0 | Status: SHIPPED | OUTPATIENT
Start: 2020-03-31 | End: 2020-08-06

## 2020-03-31 RX ORDER — MECLIZINE HCL 12.5 MG 12.5 MG/1
12.5 TABLET ORAL 3 TIMES DAILY PRN
Qty: 90 TABLET | Refills: 0 | Status: SHIPPED | OUTPATIENT
Start: 2020-03-31 | End: 2020-09-09

## 2020-03-31 RX ORDER — OXYCODONE AND ACETAMINOPHEN 7.5; 325 MG/1; MG/1
1 TABLET ORAL EVERY 8 HOURS PRN
Qty: 30 TABLET | Refills: 0 | Status: SHIPPED | OUTPATIENT
Start: 2020-03-31 | End: 2020-05-29 | Stop reason: SDUPTHER

## 2020-03-31 NOTE — PROGRESS NOTES
Subjective:       Patient ID: Susu Luther is a 70 y.o. female.    Chief Complaint: Malignant neoplasm of upper-outer quadrant of right breast in female, estrogen receptor positive [C50.411, Z17.0]  HPI: We have an opportunity to see Ms. Susu Luther in Hematology Oncology clinic at [facility] on 03/31/2020.  Ms. Susu Luther is a 70 y.o. woman with pT2N0 invasive lobular breast cancer ER positive NV negative Her2 negative s/p lumpectomy and sentinel node biopsy.       Was found right breast mass on self breast exam.  Screening mammogram on 2/26/2019 showed Impression:  Right  Mass: Right breast mass at the lower outer middle position. Assessment: 0 - Incomplete. Special Views: Spot Compression View and Ultrasound are recommended.      Left  There is no mammographic evidence of malignancy.     On March 4, 2019, underwent US guided biopsy showed FINAL PATHOLOGIC DIAGNOSIS  1. BREAST, RIGHT, LOWER OUTER 08:00, ULTRASOUND-GUIDED BIOPSY:  Invasive lobular carcinoma of breast.  Size of invasive carcinoma: 19 mm in greatest linear dimension within a single core biopsy fragment.  [place] Histologic Score: Grade 2 of 3.  Tubule formation: 3  Nuclear pleomorphism: 2  Mitoses: 1  Associated lobular carcinoma in situ (LCIS) is present.  No lymphovascular or perineural invasion.  Breast biomarkers are pending and will be included in the supplemental report.  2. AXILLA, RIGHT LYMPH NODES, ULTRASOUND-GUIDED BIOPSY:  Benign lymph node without evidence of metastatic carcinoma.  Immunohistochemical stains (Cam 5.2 and CK7) are confirmatory.     On March 27, 2019 underwent lumpectomy with sentinel node.  Pathology showed   PROCEDURE: Excision/lumpectomy  SPECIMEN LATERALITY: Right breast  TUMOR SITE: 8 o'clock  TUMOR SIZE: Approximately 4.0 x 3.0 x 3.0 cm  HISTOLOGIC TYPE: Invasive lobular carcinoma  HISTOLOGIC GRADE: Grade 2 of 3  Tubular differentiation: 3  Nuclear pleomorphism: 2  Mitotic rate:  1  TUMOR FOCALITY: Single focus of invasive carcinoma  DUCTAL CARCINOMA IN SITU: Not identified  LOBULAR CARCINOMA IN SITU: Present  MARGINS:  Main specimen (#6) : Carcinoma present at superior, deep and medial margins  Distance from inferior margin: 5 mm  Distance from lateral margin: greater than 20 mm  Distance of anterior margin: 15 mm  Additionally submitted margins (specimen #7): Distance from newly designated margin: 4 mm  REGIONAL LYMPH NODES: Uninvolved tumor cells  Number of total lymph nodes examined: 8  Number of sentinel nodes examined: 4  TREATMENT EFFECT: No known presurgical therapy  LYMPH-VASCULAR INVASION: Not identified  ANCILLARY STUDIES (performed on patient's previous biopsy MS ):  ER: Positive (95% of tumor cells)  DE: Negative (less than 1 % of tumor cells)  Her2 by FISH: Negative (not amplified)  Ki-67%: Positive (70 % of tumor cells)  ADDITIONAL FINDINGS: Apocrine metaplasia, usual ductal hyperplasia, fibroadenomatoid change  PATHOLOGIC STAGE CLASSIFICATION: pT2 pN0     Oncotype type DX recurrence score 22, with distant recurrence risk 8%, absolute chemotherapy benefit <1%.     Completed adjuvant radiation.  Has right breast tenderness.  Started on anastrozole.  Has arthralgias. Could not tolerate.  AI was changed to letrozole, tolerating well.                    Has hot flashes and depression.  Cymbalta was started but had nausea diarrhea, could not tolerate. Currently on Effexor.  Hot flashes improved, but still uncomfortable.            Malignant neoplasm of upper-outer quadrant of right breast in female, estrogen receptor positive    3/14/2019 Initial Diagnosis     Malignant neoplasm of upper-outer quadrant of right breast in female, estrogen receptor positive      3/27/2019 Cancer Staged     Staging form: Breast, AJCC 8th Edition  - Pathologic stage from 3/27/2019: Stage IIA (pT2, pN0(sn), cM0, G2, ER+, DE-, HER2-) - Signed by Guido Huynh MD on 4/4/2019 5/20/2019 -  6/18/2019 Radiation Therapy     Treatment Site Ref. ID Energy Dose/Fx (Gy) #Fx Dose Correction (Gy) Total Dose (Gy) Start Date End Date Elapsed Days   Boost Boost 18X 2.5 4 / 4 0 10 6/13/2019 6/18/2019 5   RtBreastAddMV Rt Breast 18X/6X 2.66 16 / 16 0 42.56 5/20/2019 6/12/2019 23            Past Medical History:   Diagnosis Date    Encounter for blood transfusion     Hx of psychiatric care     Hypertension     Major depressive disorder, single episode, moderate with anxious distress 1/14/2020    Malignant neoplasm of upper-outer quadrant of right breast in female, estrogen receptor positive 3/14/2019    radiation    Psychiatric problem     Sleep difficulties     Stroke 2009    no residual defect    Therapy      Family History   Problem Relation Age of Onset    Diabetes Maternal Grandmother     Diabetes Maternal Grandfather     Diabetes Mother     Breast cancer Neg Hx     Colon cancer Neg Hx     Ovarian cancer Neg Hx      Social History     Socioeconomic History    Marital status:      Spouse name: Campos Luther    Number of children: 2    Years of education: Not on file    Highest education level: Not on file   Occupational History    Not on file   Social Needs    Financial resource strain: Not on file    Food insecurity:     Worry: Not on file     Inability: Not on file    Transportation needs:     Medical: Not on file     Non-medical: Not on file   Tobacco Use    Smoking status: Never Smoker    Smokeless tobacco: Never Used   Substance and Sexual Activity    Alcohol use: No    Drug use: No    Sexual activity: Yes     Partners: Male     Birth control/protection: Post-menopausal   Lifestyle    Physical activity:     Days per week: Not on file     Minutes per session: Not on file    Stress: Not on file   Relationships    Social connections:     Talks on phone: Not on file     Gets together: Not on file     Attends Worship service: Not on file     Active member of club or  organization: Not on file     Attends meetings of clubs or organizations: Not on file     Relationship status: Not on file   Other Topics Concern    Patient feels they ought to cut down on drinking/drug use Not Asked    Patient annoyed by others criticizing their drinking/drug use Not Asked    Patient has felt bad or guilty about drinking/drug use Not Asked    Patient has had a drink/used drugs as an eye opener in the AM Not Asked   Social History Narrative    Not on file     Past Surgical History:   Procedure Laterality Date    APPENDECTOMY      BREAST BIOPSY      2019    BREAST LUMPECTOMY      2019     SECTION      x 1    OOPHORECTOMY      right     SENTINEL LYMPH NODE BIOPSY Right 3/27/2019    Procedure: BIOPSY, LYMPH NODE, SENTINEL;  Surgeon: Artemio Starks MD;  [facility];  Service: General;  Laterality: Right;     Current Outpatient Medications   Medication Sig Dispense Refill    albuterol (VENTOLIN HFA) 90 mcg/actuation inhaler Inhale 2 puffs into the lungs every 4 (four) hours as needed for Shortness of Breath. 18 g 11    aspirin (ECOTRIN) 81 MG EC tablet Take 1 tablet (81 mg total) by mouth once daily. (Patient taking differently: Take 81 mg by mouth every other day. )  0    atorvastatin (LIPITOR) 40 MG tablet Take 1 tablet (40 mg total) by mouth once daily. 90 tablet 3    bismuth subsalicylate (PEPTO BISMOL) 262 mg/15 mL suspension Take 15 mLs by mouth every 6 (six) hours as needed for Indigestion.      blood pressure kit med and lrg Kit Take blood pressure first thing in the morning. 1 each 0    butalbital-acetaminophen-caff -40 mg -40 mg Cap Take 1 tablet by mouth 3 (three) times daily as needed. 60 capsule 0    butalbital-acetaminophen-caffeine -40 mg (FIORICET, ESGIC) -40 mg per tablet Take 1 tablet by mouth every 4 (four) hours as needed for Pain. 30 tablet 0    cholecalciferol, vitamin D3, 50,000 unit capsule Take 50,000 Units by mouth once  a week.   0    cloNIDine (CATAPRES) 0.1 MG tablet Take 1 tablet (0.1 mg total) by mouth 3 (three) times daily. 270 tablet 2    diazePAM (VALIUM) 10 MG Tab Take 1 tablet (10 mg total) by mouth 2 (two) times daily as needed. 60 tablet 1    emollient combination no.63 (MIADERM) Lotn Apply 1 application topically 3 (three) times daily. (Patient taking differently: Apply 1 application topically 3 (three) times daily as needed. )      furosemide (LASIX) 20 MG tablet take 1/2 tablet by mouth a day. 90 tablet 0    gabapentin (NEURONTIN) 400 MG capsule       gabapentin (NEURONTIN) 600 MG tablet Take 1 tablet (600 mg total) by mouth 3 (three) times daily. may cause drowsiness 90 tablet 2    hydrALAZINE (APRESOLINE) 25 MG tablet Take 1 tablet (25 mg total) by mouth every 8 (eight) hours. 90 tablet 1    hydroCHLOROthiazide (HYDRODIURIL) 25 MG tablet Take 1 tablet (25 mg total) by mouth once daily. 90 tablet 3    hydroCHLOROthiazide (MICROZIDE) 12.5 mg capsule       inhalation spacing device (COMPACT SPACE CHAMBER) Use as directed for inhalation. 1 Device 0    letrozole (FEMARA) 2.5 mg Tab Take 1 tablet (2.5 mg total) by mouth once daily. 30 tablet 11    lidocaine (LIDODERM) 5 % Place 1 patch onto the skin daily as needed. Remove & Discard patch within 12 hours or as directed by MD 30 patch 0    lidocaine HCl-menthol 4-1 % PtMd Apply 1 patch topically daily as needed. 30 patch 6    lidocaine-prilocaine (EMLA) cream       losartan (COZAAR) 100 MG tablet Take 100 mg by mouth once daily.  3    morphine (MSIR) 15 MG tablet Take 1 tablet (15 mg total) by mouth every 6 (six) hours as needed for Pain. (Patient not taking: Reported on 3/3/2020) 40 tablet 0    multivitamin (ONE DAILY MULTIVITAMIN) per tablet Take 1 tablet by mouth once daily.      naproxen (NAPROSYN) 500 MG tablet Take 1 tablet (500 mg total) by mouth 2 (two) times daily with meals. 60 tablet 2    NARCAN 4 mg/actuation [place] CALL 911. ADMINISTER A  SINGLE SPRAY INTRANASALLY INTO ONE NOSTRIL UPON SIGNS OF OPIOID OVERDOSE. MAY REPEAT AFTER 3 MINUTES IF NO RESPONSE.  0    OLANZapine (ZYPREXA) 10 MG tablet Take 10 mg by mouth nightly.      ondansetron (ZOFRAN-ODT) 8 MG TbDL Take 1 tablet (8 mg total) by mouth every 8 (eight) hours as needed (nausea). 60 tablet 1    oxyCODONE-acetaminophen (PERCOCET) 7.5-325 mg per tablet Take 1 tablet by mouth every 8 (eight) hours as needed for Pain. 30 tablet 0    prochlorperazine (COMPAZINE) 10 MG tablet Take 1 tablet (10 mg total) by mouth 4 (four) times daily as needed (nausea). 60 tablet 1    propranolol (INDERAL) 40 MG tablet Take 1 tablet (40 mg total) by mouth 2 (two) times daily. 60 tablet 11    sertraline (ZOLOFT) 50 MG tablet Take 1 tablet (50 mg total) by mouth once daily. (Patient not taking: Reported on 3/11/2020) 90 tablet 0    traMADol (ULTRAM) 50 mg tablet Take 1 tablet (50 mg total) by mouth every 6 (six) hours as needed for Pain. 50 tablet 0    venlafaxine (EFFEXOR-XR) 150 MG Cp24 Take 1 capsule (150 mg total) by mouth once daily. 90 capsule 3     No current facility-administered medications for this visit.        Labs:  Lab Results   Component Value Date    WBC 7.27 12/16/2019    HGB 12.3 12/16/2019    HCT 39.4 12/16/2019    MCV 78 (L) 12/16/2019     12/16/2019     BMP  Lab Results   Component Value Date     03/03/2020    K 4.3 03/03/2020     03/03/2020    CO2 25 03/03/2020    BUN 22 03/03/2020    CREATININE 1.4 03/03/2020    CALCIUM 9.8 03/03/2020    ANIONGAP 8 03/03/2020    ESTGFRAFRICA 44.5 (A) 03/03/2020    EGFRNONAA 38.6 (A) 03/03/2020     Lab Results   Component Value Date    ALT 12 03/03/2020    AST 19 03/03/2020    ALKPHOS 100 03/03/2020    BILITOT 0.3 03/03/2020       Lab Results   Component Value Date    IRON 69 12/16/2019    TIBC 302 12/16/2019    FERRITIN 56 12/16/2019     Lab Results   Component Value Date    DYYYLLYI06 994 (H) 12/16/2019     Lab Results   Component  Value Date    FOLATE 3.9 (L) 12/16/2019     Lab Results   Component Value Date    TSH 1.170 12/16/2019       I have reviewed the radiology reports and examined the scan/xray images.    Review of Systems   Constitutional: Negative.    HENT: Negative.    Eyes: Negative.    Respiratory: Negative.    Cardiovascular: Negative.    Gastrointestinal: Negative.    Endocrine: Negative.    Genitourinary: Negative.    Musculoskeletal: Negative.    Skin: Negative.    Allergic/Immunologic: Negative.    Neurological: Negative.    Hematological: Negative.    Psychiatric/Behavioral: Negative.      ECOG SCORE    0 - Fully active-able to carry on all pre-disease performance without restriction            Objective:   There were no vitals filed for this visit.There is no height or weight on file to calculate BMI.  Physical Exam      Assessment:      1. Malignant neoplasm of upper-outer quadrant of right breast in female, estrogen receptor positive    2. Chronic pain of breast           Plan:     Malignant neoplasm of upper-outer quadrant of right breast in female, estrogen receptor positive  Continue femara    Chronic pain of breast  MRI breast negative.  Due to radiation.  Advised to reestablish with Physical Therapy.  -     oxyCODONE-acetaminophen (PERCOCET) 7.5-325 mg per tablet; Take 1 tablet by mouth every 8 (eight) hours as needed for Pain.  Dispense: 30 tablet; Refill: 0    Anxiety  Continue on zoloft, effexor.  Will schedule psychiatry referral virtual visit.  -     diazePAM (VALIUM) 10 MG Tab; Take 1 tablet (10 mg total) by mouth 2 (two) times daily as needed.  Dispense: 60 tablet; Refill: 1  -     sertraline (ZOLOFT) 50 MG tablet; Take 1 tablet (50 mg total) by mouth once daily.  Dispense: 90 tablet; Refill: 0    Malignant neoplasm of upper-outer quadrant of right breast in female, estrogen receptor positive  Comments:  Pt continue to see Hem/Onc  Orders:  -     diazePAM (VALIUM) 10 MG Tab; Take 1 tablet (10 mg total) by  mouth 2 (two) times daily as needed.  Dispense: 60 tablet; Refill: 1    Dizziness  -     meclizine (ANTIVERT) 12.5 mg tablet; Take 1 tablet (12.5 mg total) by mouth 3 (three) times daily as needed.  Dispense: 90 tablet; Refill: 0    ? Due to femara.  If does not improve with meclizine, will get MRI brain.    Consult Start Time: 03/31/2020 10:00  Consult End Time: 03/31/2020 10:40      The patient location is: home  The chief complaint leading to consultation is: breast pain, dizziness, anxiety  Visit type: Virtual visit with synchronous audio  Total time spent with patient: 40 minutes  Each patient to whom he or she provides medical services by telemedicine is:  (1) informed of the relationship between the physician and patient and the respective role of any other health care provider with respect to management of the patient; and (2) notified that he or she may decline to receive medical services by telemedicine and may withdraw from such care at any time.    Notes: as above

## 2020-03-31 NOTE — TELEPHONE ENCOUNTER
----- Message from Guido Huynh MD sent at 3/31/2020 10:38 AM CDT -----  Mily Winston we set up for virtual visit next Friday please, no lab needed.

## 2020-04-01 ENCOUNTER — TELEPHONE (OUTPATIENT)
Dept: PAIN MEDICINE | Facility: CLINIC | Age: 70
End: 2020-04-01

## 2020-04-02 ENCOUNTER — PATIENT OUTREACH (OUTPATIENT)
Dept: ADMINISTRATIVE | Facility: OTHER | Age: 70
End: 2020-04-02

## 2020-04-02 ENCOUNTER — TELEPHONE (OUTPATIENT)
Dept: PAIN MEDICINE | Facility: CLINIC | Age: 70
End: 2020-04-02

## 2020-04-02 ENCOUNTER — TELEPHONE (OUTPATIENT)
Dept: HEMATOLOGY/ONCOLOGY | Facility: CLINIC | Age: 70
End: 2020-04-02

## 2020-04-02 NOTE — TELEPHONE ENCOUNTER
Left a voicemail for Ms. Luther to call the clinic at her earliest convenience.    YOLI Pina MD  Interventional Pain Medicine  [facility] [place]

## 2020-04-02 NOTE — TELEPHONE ENCOUNTER
SW attempted to contact pt for referral for telepsych services. The cell number was busy and the home number had been disconnected.    F/u on tmrw.

## 2020-04-06 ENCOUNTER — TELEPHONE (OUTPATIENT)
Dept: HEMATOLOGY/ONCOLOGY | Facility: CLINIC | Age: 70
End: 2020-04-06

## 2020-04-07 NOTE — PROGRESS NOTES
Subjective:       Patient ID: Susu Luther is a 70 y.o. female.    Chief Complaint: Malignant neoplasm of upper-outer quadrant of right breast in female, estrogen receptor positive [C50.411, Z17.0]  HPI: We have an opportunity to see Ms. Susu Luther in Hematology Oncology clinic at [facility] on 04/07/2020.  Ms. Susu Luther is a 70 y.o. woman with pT2N0 invasive lobular breast cancer ER positive NM negative Her2 negative s/p lumpectomy and sentinel node biopsy.       Was found right breast mass on self breast exam.  Screening mammogram on 2/26/2019 showed Impression:  Right  Mass: Right breast mass at the lower outer middle position. Assessment: 0 - Incomplete. Special Views: Spot Compression View and Ultrasound are recommended.      Left  There is no mammographic evidence of malignancy.     On March 4, 2019, underwent US guided biopsy showed FINAL PATHOLOGIC DIAGNOSIS  1. BREAST, RIGHT, LOWER OUTER 08:00, ULTRASOUND-GUIDED BIOPSY:  Invasive lobular carcinoma of breast.  Size of invasive carcinoma: 19 mm in greatest linear dimension within a single core biopsy fragment.  Carney Histologic Score: Grade 2 of 3.  Tubule formation: 3  Nuclear pleomorphism: 2  Mitoses: 1  Associated lobular carcinoma in situ (LCIS) is present.  No lymphovascular or perineural invasion.  Breast biomarkers are pending and will be included in the supplemental report.  2. AXILLA, RIGHT LYMPH NODES, ULTRASOUND-GUIDED BIOPSY:  Benign lymph node without evidence of metastatic carcinoma.  Immunohistochemical stains (Cam 5.2 and CK7) are confirmatory.     On March 27, 2019 underwent lumpectomy with sentinel node.  Pathology showed   PROCEDURE: Excision/lumpectomy  SPECIMEN LATERALITY: Right breast  TUMOR SITE: 8 o'clock  TUMOR SIZE: Approximately 4.0 x 3.0 x 3.0 cm  HISTOLOGIC TYPE: Invasive lobular carcinoma  HISTOLOGIC GRADE: Grade 2 of 3  Tubular differentiation: 3  Nuclear pleomorphism: 2  Mitotic rate:  1  TUMOR FOCALITY: Single focus of invasive carcinoma  DUCTAL CARCINOMA IN SITU: Not identified  LOBULAR CARCINOMA IN SITU: Present  MARGINS:  Main specimen (#6) : Carcinoma present at superior, deep and medial margins  Distance from inferior margin: 5 mm  Distance from lateral margin: greater than 20 mm  Distance of anterior margin: 15 mm  Additionally submitted margins (specimen #7): Distance from newly designated margin: 4 mm  REGIONAL LYMPH NODES: Uninvolved tumor cells  Number of total lymph nodes examined: 8  Number of sentinel nodes examined: 4  TREATMENT EFFECT: No known presurgical therapy  LYMPH-VASCULAR INVASION: Not identified  ANCILLARY STUDIES (performed on patient's previous biopsy MS ):  ER: Positive (95% of tumor cells)  WI: Negative (less than 1 % of tumor cells)  Her2 by FISH: Negative (not amplified)  Ki-67%: Positive (70 % of tumor cells)  ADDITIONAL FINDINGS: Apocrine metaplasia, usual ductal hyperplasia, fibroadenomatoid change  PATHOLOGIC STAGE CLASSIFICATION: pT2 pN0     Oncotype type DX recurrence score 22, with distant recurrence risk 8%, absolute chemotherapy benefit <1%.     Completed adjuvant radiation.  Has right breast tenderness.  Started on anastrozole.  Has arthralgias. Could not tolerate.  AI was changed to letrozole, tolerating well.                    Has hot flashes and depression.  Cymbalta was started but had nausea diarrhea, could not tolerate. Currently on Effexor.  Hot flashes improved, but still uncomfortable.    Malignant neoplasm of upper-outer quadrant of right breast in female, estrogen receptor positive  Continue femara     Chronic pain of breast  MRI breast negative.  Due to radiation.  Advised to reestablish with Physical Therapy.  -     oxyCODONE-acetaminophen (PERCOCET) 7.5-325 mg per tablet; Take 1 tablet by mouth every 8 (eight) hours as needed for Pain.  Dispense: 30 tablet; Refill: 0     Anxiety  Continue on zoloft, effexor.  Will schedule psychiatry  referral virtual visit.  -     diazePAM (VALIUM) 10 MG Tab; Take 1 tablet (10 mg total) by mouth 2 (two) times daily as needed.  Dispense: 60 tablet; Refill: 1  -     sertraline (ZOLOFT) 50 MG tablet; Take 1 tablet (50 mg total) by mouth once daily.  Dispense: 90 tablet; Refill: 0     Malignant neoplasm of upper-outer quadrant of right breast in female, estrogen receptor positive  Comments:  Pt continue to see Hem/Onc  Orders:  -     diazePAM (VALIUM) 10 MG Tab; Take 1 tablet (10 mg total) by mouth 2 (two) times daily as needed.  Dispense: 60 tablet; Refill: 1     Dizziness  -     meclizine (ANTIVERT) 12.5 mg tablet; Take 1 tablet (12.5 mg total) by mouth 3 (three) times daily as needed.  Dispense: 90 tablet; Refill: 0     ? Due to femara.  If does not improve with meclizine, will get MRI brain.  Reports fell on Sunday, and has pain all over, has SOB, dizziness, and pain right breast.       Malignant neoplasm of upper-outer quadrant of right breast in female, estrogen receptor positive    3/14/2019 Initial Diagnosis     Malignant neoplasm of upper-outer quadrant of right breast in female, estrogen receptor positive      3/27/2019 Cancer Staged     Staging form: Breast, AJCC 8th Edition  - Pathologic stage from 3/27/2019: Stage IIA (pT2, pN0(sn), cM0, G2, ER+, ND-, HER2-) - Signed by Guido Huynh MD on 4/4/2019 5/20/2019 - 6/18/2019 Radiation Therapy     Treatment Site Ref. ID Energy Dose/Fx (Gy) #Fx Dose Correction (Gy) Total Dose (Gy) Start Date End Date Elapsed Days   Boost Boost 18X 2.5 4 / 4 0 10 6/13/2019 6/18/2019 5   RtBreastAddMV Rt Breast 18X/6X 2.66 16 / 16 0 42.56 5/20/2019 6/12/2019 23            Past Medical History:   Diagnosis Date    Encounter for blood transfusion     Hx of psychiatric care     Hypertension     Major depressive disorder, single episode, moderate with anxious distress 1/14/2020    Malignant neoplasm of upper-outer quadrant of right breast in female, estrogen receptor  positive 3/14/2019    radiation    Psychiatric problem     Sleep difficulties     Stroke 2009    no residual defect    Therapy      Family History   Problem Relation Age of Onset    Diabetes Maternal Grandmother     Diabetes Maternal Grandfather     Diabetes Mother     Breast cancer Neg Hx     Colon cancer Neg Hx     Ovarian cancer Neg Hx      Social History     Socioeconomic History    Marital status:      Spouse name: Campos Luther    Number of children: 2    Years of education: Not on file    Highest education level: Not on file   Occupational History    Not on file   Social Needs    Financial resource strain: Not on file    Food insecurity:     Worry: Not on file     Inability: Not on file    Transportation needs:     Medical: Not on file     Non-medical: Not on file   Tobacco Use    Smoking status: Never Smoker    Smokeless tobacco: Never Used   Substance and Sexual Activity    Alcohol use: No    Drug use: No    Sexual activity: Yes     Partners: Male     Birth control/protection: Post-menopausal   Lifestyle    Physical activity:     Days per week: Not on file     Minutes per session: Not on file    Stress: Not on file   Relationships    Social connections:     Talks on phone: Not on file     Gets together: Not on file     Attends Yarsani service: Not on file     Active member of club or organization: Not on file     Attends meetings of clubs or organizations: Not on file     Relationship status: Not on file   Other Topics Concern    Patient feels they ought to cut down on drinking/drug use Not Asked    Patient annoyed by others criticizing their drinking/drug use Not Asked    Patient has felt bad or guilty about drinking/drug use Not Asked    Patient has had a drink/used drugs as an eye opener in the AM Not Asked   Social History Narrative    Not on file     Past Surgical History:   Procedure Laterality Date    APPENDECTOMY      BREAST BIOPSY      2019    BREAST  LUMPECTOMY      2019     SECTION      x 1    OOPHORECTOMY      right     SENTINEL LYMPH NODE BIOPSY Right 3/27/2019    Procedure: BIOPSY, LYMPH NODE, SENTINEL;  Surgeon: Artemio Starks MD;  Location: [facility];  Service: General;  Laterality: Right;     Current Outpatient Medications   Medication Sig Dispense Refill    albuterol (VENTOLIN HFA) 90 mcg/actuation inhaler Inhale 2 puffs into the lungs every 4 (four) hours as needed for Shortness of Breath. 18 g 11    aspirin (ECOTRIN) 81 MG EC tablet Take 1 tablet (81 mg total) by mouth once daily. (Patient taking differently: Take 81 mg by mouth every other day. )  0    atorvastatin (LIPITOR) 40 MG tablet Take 1 tablet (40 mg total) by mouth once daily. 90 tablet 3    bismuth subsalicylate (PEPTO BISMOL) 262 mg/15 mL suspension Take 15 mLs by mouth every 6 (six) hours as needed for Indigestion.      blood pressure kit med and lrg Kit Take blood pressure first thing in the morning. 1 each 0    butalbital-acetaminophen-caff -40 mg -40 mg Cap Take 1 tablet by mouth 3 (three) times daily as needed. 60 capsule 0    butalbital-acetaminophen-caffeine -40 mg (FIORICET, ESGIC) -40 mg per tablet Take 1 tablet by mouth every 4 (four) hours as needed for Pain. 30 tablet 0    cholecalciferol, vitamin D3, 50,000 unit capsule Take 50,000 Units by mouth once a week.   0    cloNIDine (CATAPRES) 0.1 MG tablet Take 1 tablet (0.1 mg total) by mouth 3 (three) times daily. 270 tablet 2    diazePAM (VALIUM) 10 MG Tab Take 1 tablet (10 mg total) by mouth 2 (two) times daily as needed. 60 tablet 1    emollient combination no.63 (MIADERM) Lotn Apply 1 application topically 3 (three) times daily. (Patient taking differently: Apply 1 application topically 3 (three) times daily as needed. )      furosemide (LASIX) 20 MG tablet take 1/2 tablet by mouth a day. 90 tablet 0    gabapentin (NEURONTIN) 400 MG capsule       hydrALAZINE (APRESOLINE) 25 MG  tablet Take 1 tablet (25 mg total) by mouth every 8 (eight) hours. 90 tablet 1    hydroCHLOROthiazide (HYDRODIURIL) 25 MG tablet Take 1 tablet (25 mg total) by mouth once daily. 90 tablet 3    hydroCHLOROthiazide (MICROZIDE) 12.5 mg capsule       inhalation spacing device (COMPACT SPACE CHAMBER) Use as directed for inhalation. 1 Device 0    letrozole (FEMARA) 2.5 mg Tab Take 1 tablet (2.5 mg total) by mouth once daily. 30 tablet 11    lidocaine (LIDODERM) 5 % Place 1 patch onto the skin daily as needed. Remove & Discard patch within 12 hours or as directed by MD 30 patch 0    lidocaine HCl-menthol 4-1 % PtMd Apply 1 patch topically daily as needed. 30 patch 6    lidocaine-prilocaine (EMLA) cream       losartan (COZAAR) 100 MG tablet Take 100 mg by mouth once daily.  3    meclizine (ANTIVERT) 12.5 mg tablet Take 1 tablet (12.5 mg total) by mouth 3 (three) times daily as needed. 90 tablet 0    multivitamin (ONE DAILY MULTIVITAMIN) per tablet Take 1 tablet by mouth once daily.      naproxen (NAPROSYN) 500 MG tablet Take 1 tablet (500 mg total) by mouth 2 (two) times daily with meals. 60 tablet 2    NARCAN 4 mg/actuation [place] CALL 911. ADMINISTER A SINGLE SPRAY INTRANASALLY INTO ONE NOSTRIL UPON SIGNS OF OPIOID OVERDOSE. MAY REPEAT AFTER 3 MINUTES IF NO RESPONSE.  0    OLANZapine (ZYPREXA) 10 MG tablet Take 10 mg by mouth nightly.      ondansetron (ZOFRAN-ODT) 8 MG TbDL Take 1 tablet (8 mg total) by mouth every 8 (eight) hours as needed (nausea). 60 tablet 1    oxyCODONE-acetaminophen (PERCOCET) 7.5-325 mg per tablet Take 1 tablet by mouth every 8 (eight) hours as needed for Pain. 30 tablet 0    prochlorperazine (COMPAZINE) 10 MG tablet Take 1 tablet (10 mg total) by mouth 4 (four) times daily as needed (nausea). 60 tablet 1    propranolol (INDERAL) 40 MG tablet Take 1 tablet (40 mg total) by mouth 2 (two) times daily. 60 tablet 11    sertraline (ZOLOFT) 50 MG tablet Take 1 tablet (50 mg total) by  mouth once daily. 90 tablet 0    traMADol (ULTRAM) 50 mg tablet Take 1 tablet (50 mg total) by mouth every 6 (six) hours as needed for Pain. 50 tablet 0    venlafaxine (EFFEXOR-XR) 150 MG Cp24 Take 1 capsule (150 mg total) by mouth once daily. 90 capsule 3     No current facility-administered medications for this visit.        Labs:  Lab Results   Component Value Date    WBC 7.27 12/16/2019    HGB 12.3 12/16/2019    HCT 39.4 12/16/2019    MCV 78 (L) 12/16/2019     12/16/2019     BMP  Lab Results   Component Value Date     03/03/2020    K 4.3 03/03/2020     03/03/2020    CO2 25 03/03/2020    BUN 22 03/03/2020    CREATININE 1.4 03/03/2020    CALCIUM 9.8 03/03/2020    ANIONGAP 8 03/03/2020    ESTGFRAFRICA 44.5 (A) 03/03/2020    EGFRNONAA 38.6 (A) 03/03/2020     Lab Results   Component Value Date    ALT 12 03/03/2020    AST 19 03/03/2020    ALKPHOS 100 03/03/2020    BILITOT 0.3 03/03/2020       Lab Results   Component Value Date    IRON 69 12/16/2019    TIBC 302 12/16/2019    FERRITIN 56 12/16/2019     Lab Results   Component Value Date    YTRUUVYC65 994 (H) 12/16/2019     Lab Results   Component Value Date    FOLATE 3.9 (L) 12/16/2019     Lab Results   Component Value Date    TSH 1.170 12/16/2019       I have reviewed the radiology reports and examined the scan/xray images.    Review of Systems   Constitutional: Positive for fatigue.   HENT: Negative.    Eyes: Negative.    Respiratory: Positive for shortness of breath.    Cardiovascular: Negative.    Gastrointestinal: Negative.    Endocrine: Negative.    Genitourinary: Negative.    Musculoskeletal: Negative.    Skin: Negative.    Allergic/Immunologic: Negative.    Neurological: Positive for dizziness.   Hematological: Negative.    Psychiatric/Behavioral: Negative.      ECOG SCORE    1 - Restricted in strenuous activity-ambulatory and able to carry out work of a light nature            Objective:   There were no vitals filed for this visit.There is  no height or weight on file to calculate BMI.  Physical Exam      Assessment:      1. Malignant neoplasm of upper-outer quadrant of right breast in female, estrogen receptor positive    2. Chronic pain of breast    3. Malignant neoplasm of upper-outer quadrant of right breast in female, estrogen receptor positive    4. Malignant neoplasm of upper-outer quadrant of right breast in female, estrogen receptor positive    5. Accident, sequela    6. SOB (shortness of breath)           Plan:     Malignant neoplasm of upper-outer quadrant of right breast in female, estrogen receptor positive  Stop femara, as may contribute to dizziness falling.  -     traMADoL (ULTRAM) 50 mg tablet; Take 1 tablet (50 mg total) by mouth every 6 (six) hours as needed for Pain.  Dispense: 50 tablet; Refill: 0  -     diazePAM (VALIUM) 10 MG Tab; Take 1 tablet (10 mg total) by mouth 2 (two) times daily as needed.  Dispense: 60 tablet; Refill: 1    -     Ambulatory referral/consult to Physical/Occupational Therapy; Future; Expected date: 04/15/2020    -     Echo Color Flow Doppler? Yes; Bubble Contrast? No; Future  -     CTA Chest Non Coronary; Future; Expected date: 04/08/2020  -     US Breast Right Complete; Future; Expected date: 04/08/2020    Chronic pain of breast    -     Ambulatory referral/consult to Physical/Occupational Therapy; Future; Expected date: 04/15/2020    -     Echo Color Flow Doppler? Yes; Bubble Contrast? No; Future  -     CTA Chest Non Coronary; Future; Expected date: 04/08/2020  -     US Breast Right Complete; Future; Expected date: 04/08/2020    Malignant neoplasm of upper-outer quadrant of right breast in female, estrogen receptor positive      -     traMADoL (ULTRAM) 50 mg tablet; Take 1 tablet (50 mg total) by mouth every 6 (six) hours as needed for Pain.  Dispense: 50 tablet; Refill: 0  -     diazePAM (VALIUM) 10 MG Tab; Take 1 tablet (10 mg total) by mouth 2 (two) times daily as needed.  Dispense: 60 tablet; Refill:  1    -     Ambulatory referral/consult to Physical/Occupational Therapy; Future; Expected date: 04/15/2020    -     Echo Color Flow Doppler? Yes; Bubble Contrast? No; Future  -     CTA Chest Non Coronary; Future; Expected date: 04/08/2020  -     US Breast Right Complete; Future; Expected date: 04/08/2020    Malignant neoplasm of upper-outer quadrant of right breast in female, estrogen receptor positive    -     traMADoL (ULTRAM) 50 mg tablet; Take 1 tablet (50 mg total) by mouth every 6 (six) hours as needed for Pain.  Dispense: 50 tablet; Refill: 0  -     diazePAM (VALIUM) 10 MG Tab; Take 1 tablet (10 mg total) by mouth 2 (two) times daily as needed.  Dispense: 60 tablet; Refill: 1  -     Cancel: US Breast Right Complete; Future; Expected date: 04/08/2020  -     Ambulatory referral/consult to Physical/Occupational Therapy; Future; Expected date: 04/15/2020    -     Echo Color Flow Doppler? Yes; Bubble Contrast? No; Future  -     CTA Chest Non Coronary; Future; Expected date: 04/08/2020  -     US Breast Right Complete; Future; Expected date: 04/08/2020    Accident, sequela  -     X-Ray Cervical Spine Complete 5 view; Future; Expected date: 04/08/2020  -     X-Ray Thoracic Spine 4 or more views; Future; Expected date: 04/08/2020    SOB (shortness of breath)  -     Echo Color Flow Doppler? Yes; Bubble Contrast? No; Future  -     CTA Chest Non Coronary; Future; Expected date: 04/08/2020      Consult Start Time: 04/08/2020 14:20  Consult End Time: 04/08/2020 15:10      The patient location is: home  The chief complaint leading to consultation is: as above  Visit type: Virtual visit with synchronous audio and video  Total time spent with patient: 40 minutes  Each patient to whom he or she provides medical services by telemedicine is:  (1) informed of the relationship between the physician and patient and the respective role of any other health care provider with respect to management of the patient; and (2) notified that  he or she may decline to receive medical services by telemedicine and may withdraw from such care at any time.    Notes: as above

## 2020-04-08 ENCOUNTER — OFFICE VISIT (OUTPATIENT)
Dept: HEMATOLOGY/ONCOLOGY | Facility: CLINIC | Age: 70
End: 2020-04-08
Payer: COMMERCIAL

## 2020-04-08 ENCOUNTER — TELEPHONE (OUTPATIENT)
Dept: HEMATOLOGY/ONCOLOGY | Facility: CLINIC | Age: 70
End: 2020-04-08

## 2020-04-08 DIAGNOSIS — Z17.0 MALIGNANT NEOPLASM OF UPPER-OUTER QUADRANT OF RIGHT BREAST IN FEMALE, ESTROGEN RECEPTOR POSITIVE: Chronic | ICD-10-CM

## 2020-04-08 DIAGNOSIS — C50.411 MALIGNANT NEOPLASM OF UPPER-OUTER QUADRANT OF RIGHT BREAST IN FEMALE, ESTROGEN RECEPTOR POSITIVE: Primary | Chronic | ICD-10-CM

## 2020-04-08 DIAGNOSIS — R06.02 SOB (SHORTNESS OF BREATH): ICD-10-CM

## 2020-04-08 DIAGNOSIS — C50.411 MALIGNANT NEOPLASM OF UPPER-OUTER QUADRANT OF RIGHT BREAST IN FEMALE, ESTROGEN RECEPTOR POSITIVE: Chronic | ICD-10-CM
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
## 2020-04-08 DIAGNOSIS — Z17.0 MALIGNANT NEOPLASM OF UPPER-OUTER QUADRANT OF RIGHT BREAST IN FEMALE, ESTROGEN RECEPTOR POSITIVE: Primary | Chronic | ICD-10-CM

## 2020-04-08 DIAGNOSIS — G89.29 CHRONIC PAIN OF BREAST: ICD-10-CM

## 2020-04-08 DIAGNOSIS — N64.4 CHRONIC PAIN OF BREAST: ICD-10-CM

## 2020-04-08 PROCEDURE — 1101F PR PT FALLS ASSESS DOC 0-1 FALLS W/OUT INJ PAST YR: ICD-10-PCS | Mod: CPTII,S$GLB,, | Performed by: INTERNAL MEDICINE

## 2020-04-08 PROCEDURE — 99215 PR OFFICE/OUTPT VISIT, EST, LEVL V, 40-54 MIN: ICD-10-PCS | Mod: S$GLB,,, | Performed by: INTERNAL MEDICINE

## 2020-04-08 PROCEDURE — 1101F PT FALLS ASSESS-DOCD LE1/YR: CPT | Mod: CPTII,S$GLB,, | Performed by: INTERNAL MEDICINE

## 2020-04-08 PROCEDURE — 99215 OFFICE O/P EST HI 40 MIN: CPT | Mod: S$GLB,,, | Performed by: INTERNAL MEDICINE

## 2020-04-08 PROCEDURE — 99999 PR PBB SHADOW E&M-EST. PATIENT-LVL II: CPT | Mod: PBBFAC,,, | Performed by: INTERNAL MEDICINE

## 2020-04-08 PROCEDURE — 99999 PR PBB SHADOW E&M-EST. PATIENT-LVL II: ICD-10-PCS | Mod: PBBFAC,,, | Performed by: INTERNAL MEDICINE

## 2020-04-08 PROCEDURE — 1159F PR MEDICATION LIST DOCUMENTED IN MEDICAL RECORD: ICD-10-PCS | Mod: S$GLB,,, | Performed by: INTERNAL MEDICINE

## 2020-04-08 PROCEDURE — 1159F MED LIST DOCD IN RCRD: CPT | Mod: S$GLB,,, | Performed by: INTERNAL MEDICINE

## 2020-04-08 RX ORDER — DIAZEPAM 10 MG/1
10 TABLET ORAL 2 TIMES DAILY PRN
Qty: 60 TABLET | Refills: 1 | Status: SHIPPED | OUTPATIENT
Start: 2020-04-08 | End: 2020-04-08 | Stop reason: SDUPTHER

## 2020-04-08 RX ORDER — TRAMADOL HYDROCHLORIDE 50 MG/1
50 TABLET ORAL EVERY 6 HOURS PRN
Qty: 50 TABLET | Refills: 0 | Status: SHIPPED | OUTPATIENT
Start: 2020-04-08 | End: 2020-05-25

## 2020-04-08 RX ORDER — DIAZEPAM 10 MG/1
10 TABLET ORAL 2 TIMES DAILY PRN
Qty: 60 TABLET | Refills: 1 | Status: SHIPPED | OUTPATIENT
Start: 2020-04-08 | End: 2020-07-22 | Stop reason: SDUPTHER

## 2020-04-08 NOTE — TELEPHONE ENCOUNTER
SW attempted to contact pt for referral for telepsych services. All numbers in chart received a busy signal.    F/u by Friday.

## 2020-04-14 ENCOUNTER — TELEPHONE (OUTPATIENT)
Dept: PAIN MEDICINE | Facility: CLINIC | Age: 70
End: 2020-04-14

## 2020-04-21 ENCOUNTER — TELEPHONE (OUTPATIENT)
Dept: HEMATOLOGY/ONCOLOGY | Facility: CLINIC | Age: 70
End: 2020-04-21

## 2020-04-21 NOTE — TELEPHONE ENCOUNTER
4/20/2020    SW contacted pt to discuss psychology/psychiatry needs. Pt explained that she has not been feeling the best. Pt expressed concerns with COVID-19 as well. SW listened and provided emotional support. Pt explained that her doctor recommended that she speak with a psychologist for support. Pt and SW discussed appt availability and pt agreed to a phone call with Dr. Aviles on es.    F/u as needs arise

## 2020-04-24 ENCOUNTER — TELEPHONE (OUTPATIENT)
Dept: PHARMACY | Facility: CLINIC | Age: 70
End: 2020-04-24

## 2020-04-27 DIAGNOSIS — C50.411 MALIGNANT NEOPLASM OF UPPER-OUTER QUADRANT OF RIGHT BREAST IN FEMALE, ESTROGEN RECEPTOR POSITIVE: Chronic | ICD-10-CM

## 2020-04-27 DIAGNOSIS — Z17.0 MALIGNANT NEOPLASM OF UPPER-OUTER QUADRANT OF RIGHT BREAST IN FEMALE, ESTROGEN RECEPTOR POSITIVE: Chronic | ICD-10-CM

## 2020-04-27 RX ORDER — BUTALBITAL, ACETAMINOPHEN AND CAFFEINE 50; 325; 40 MG/1; MG/1; MG/1
CAPSULE ORAL
Qty: 60 CAPSULE | Refills: 0 | Status: SHIPPED | OUTPATIENT
Start: 2020-04-27 | End: 2020-05-29 | Stop reason: SDUPTHER

## 2020-04-28 ENCOUNTER — TELEPHONE (OUTPATIENT)
Dept: PSYCHIATRY | Facility: CLINIC | Age: 70
End: 2020-04-28

## 2020-04-28 NOTE — TELEPHONE ENCOUNTER
Called patient x2 in reference to her 0930 appointment today via phone. No answer. Phone went straight to . Left voicemail.

## 2020-04-28 NOTE — TELEPHONE ENCOUNTER
Patient returned phone call. Patient had a fall on last Sunday and is in pain. She reported the incident to her med team.  Advised her to present to the ED, but she declined.  Spoke with her briefly. Will reschedule for a full appt.

## 2020-05-01 DIAGNOSIS — I10 UNCONTROLLED HYPERTENSION: ICD-10-CM

## 2020-05-01 RX ORDER — FUROSEMIDE 20 MG/1
TABLET ORAL
Qty: 90 TABLET | Refills: 0 | Status: CANCELLED | OUTPATIENT
Start: 2020-05-01

## 2020-05-04 DIAGNOSIS — I10 UNCONTROLLED HYPERTENSION: ICD-10-CM

## 2020-05-04 RX ORDER — FUROSEMIDE 20 MG/1
TABLET ORAL
Qty: 90 TABLET | Refills: 0 | Status: SHIPPED | OUTPATIENT
Start: 2020-05-04 | End: 2021-01-22

## 2020-05-12 ENCOUNTER — OFFICE VISIT (OUTPATIENT)
Dept: PSYCHIATRY | Facility: CLINIC | Age: 70
End: 2020-05-12
Payer: COMMERCIAL

## 2020-05-12 ENCOUNTER — TELEPHONE (OUTPATIENT)
Dept: PSYCHIATRY | Facility: CLINIC | Age: 70
End: 2020-05-12

## 2020-05-12 DIAGNOSIS — F54 PSYCHOLOGICAL FACTORS AFFECTING MEDICAL CONDITION: ICD-10-CM

## 2020-05-12 DIAGNOSIS — G47.33 OSA (OBSTRUCTIVE SLEEP APNEA): Chronic | ICD-10-CM

## 2020-05-12 DIAGNOSIS — F32.1 MAJOR DEPRESSIVE DISORDER, SINGLE EPISODE, MODERATE WITH ANXIOUS DISTRESS: Primary | ICD-10-CM

## 2020-05-12 PROCEDURE — 99999 PR PBB SHADOW E&M-EST. PATIENT-LVL II: ICD-10-PCS | Mod: PBBFAC,,, | Performed by: PSYCHOLOGIST

## 2020-05-12 PROCEDURE — 99999 PR PBB SHADOW E&M-EST. PATIENT-LVL II: CPT | Mod: PBBFAC,,, | Performed by: PSYCHOLOGIST

## 2020-05-12 PROCEDURE — 90832 PR PSYCHOTHERAPY W/PATIENT, 30 MIN: ICD-10-PCS | Mod: S$GLB,,, | Performed by: PSYCHOLOGIST

## 2020-05-12 PROCEDURE — 90832 PSYTX W PT 30 MINUTES: CPT | Mod: S$GLB,,, | Performed by: PSYCHOLOGIST

## 2020-05-12 NOTE — PROGRESS NOTES
INFORMED CONSENT: Susu Luther   is known to this provider and identity was confirmed via NAME and .  The patient has been informed of the risks and benefits associated with engaging in psychotherapy, the handling of protected health information, the rights of privacy and the limits of confidentiality. The patient has also been informed of the importance of reporting any suicidal or homicidal ideation to this or any provider to ensure safety of all parties, and the Susu Luther expressed understanding. The patient was agreeable to these terms and freely participates in individual psychotherapy.    Telemedicine PSYCHO-ONCOLOGY NOTE/ Individual Psychotherapy   (patient not on premises due to COVID-19 restrictions)    Consultation started: 2020 at 1:58 PM   The chief complaint leading to consultation is: anxiety and depression  The patient location is:   Patient home  Virtual visit with synchronous audio only.  Patient alone at the time of consultation    Crisis Disclaimer: Patient was informed that due to the virtual nature of the visit, that if a crisis develops, protocols will be implemented to ensure patient safety, including but not limited to: 1) Initiating a welfare check with local Law Enforcement, 2) Calling [facility]/National Crisis Hotline, and/or 3) Initiating PEC/CEC procedures.      Each patient provided medical services by telemedicine is:  (1) informed of the relationship between the physician and patient and the respective role of any other health care provider with respect to management of the patient; and (2) notified that he or she may decline to receive medical services by telemedicine and may withdraw from such care at any time.    Date: 2020   Site of therapist:  [facility]         Therapeutic Intervention: Met with patient.  Outpatient - Behavior modifying psychotherapy 30 min - CPT code 59135    Referring provider:    Chief complaint/reason for encounter: depression  and anxiety   Met with patient to evaluate psychosocial adaptation to survivorship of Breast Cancer    Patient was last seen by me on 1/14/2020    Objective:      Susu Lombardoews phoned in late for the session.  The patient was fully cooperative throughout the session.  Behavior/Cooperation: friendly and cooperative  Speech: normal in rate, volume, and tone and appropriate quality, quantity and organization of sentences  Mood: anxious, dysthymic  Affect: blunted  Thought Process: goal-directed, logical  Thought Content: normal,  No delusions or paranoia; did not appear to be responding to internal stimuli during the session  Orientation: grossly intact  Memory: Grossly intact  Attention Span/Concentration: Attends to session without distraction; reports no difficulty  Fund of Knowledge: average  Estimate of Intelligence: average from verbal skills and history  Cognition: grossly intact  Insight: patient has awareness of illness; good insight into own behavior and behavior of others  Judgment: the patient's behavior is adequate to circumstances      Interval history and content of current session:  Discussed treatment and factors that are interfering with patient seeking out necessary treatment. Discussed continued isolation and problem-solved around barriers to increasing behavioral activation.  Reports to be coping with significant difficulty. Evaluated cognitive response, paying particular attention to negative intrusive thoughts of a persistent and detrimental nature. Thoughts of this type are in evidence with severe distress. Identified and evaluated psychosocial and environmental stressors secondary to diagnosis and treatment.     Focused on providing cognitive behavioral therapy to address negative cognitions. Examined proactive behaviors that may be implemented to minimize or ameliorate psychosocial stressors secondary to diagnosis and treatment.     Risk parameters:   Patient reports no suicidal  ideation  Patient reports no homicidal ideation  Patient reports no self-injurious behavior  Patient reports no violent behavior   Safety needs:  None at this time      Verbal deficits: None     Patient's response to intervention:The patient's response to intervention is accepting.     Progress toward goals and other mental status changes:  The patient's progress toward goals is limited.      Progress to date:No Progress - Continue Objectives      Goals from last visit: Not attempted      Patient reported outcomes:      Distress Thermometer:   Distress Score    Distress Score: 10        Practical Problems Physical Problems                                                   Family Problems                                         Emotional Problems              Fears: Yes                                          Spiritual/Religions Concerns               Other Problems    Other Problems:: COVID Fears         Client Strengths: verbal, intelligent, successful, good social support, good insight, commitment to wellness, strong alexsander, strong cultural traditions      ICD-10-CM ICD-9-CM   1. Major depressive disorder, single episode, moderate with anxious distress F32.1 296.22   2. Psychological factors affecting medical condition F54 316   3. NILS (obstructive sleep apnea) G47.33 327.23       Treatment Plan:individual psychotherapy and medication management by physician  · Target symptoms: depression, anxiety   · Why chosen therapy is appropriate versus another modality: relevant to diagnosis, evidence based practice  · Outcome monitoring methods: self-report, observation, checklist/rating scale  · Therapeutic intervention type: behavior modifying psychotherapy  · Prognosis: Fair      Behavioral goals:    Exercise:   Stress management: Spend 5 mins on porch daily   Social engagement:   Nutrition:   Smoking Cessation:   Therapy: present to the ED if needed; Communicate truthfully to Dr. Huynh regarding breast concerns    Return  to clinic: 1 week     Length of Service (minutes direct face-to-face contact): 30          Cecily Aviles PhD  [place] License #[id number]

## 2020-05-12 NOTE — Clinical Note
Hello there. I follow this patient for psych services. I am not sure how truthful she has been in the past with Dr. Huynh, but she is having breast/nipple discharged. She also fell and hit her head in the shower and continues to refuses to present to the ED. Just wanted to give you a heads up as I see you have an appointment with her coming up. I will continue to work with her.

## 2020-05-12 NOTE — TELEPHONE ENCOUNTER
Attempted to call patient for our scheduled appointment at 1pm today. No answer. Left voicemail to return call.

## 2020-05-18 ENCOUNTER — TELEPHONE (OUTPATIENT)
Dept: HEMATOLOGY/ONCOLOGY | Facility: CLINIC | Age: 70
End: 2020-05-18

## 2020-05-18 ENCOUNTER — HOSPITAL ENCOUNTER (OUTPATIENT)
Dept: RADIOLOGY | Facility: HOSPITAL | Age: 70
Discharge: HOME | End: 2020-05-18
Attending: INTERNAL MEDICINE
Payer: COMMERCIAL

## 2020-05-18 DIAGNOSIS — G89.29 CHRONIC PAIN OF BREAST: ICD-10-CM
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
## 2020-05-18 DIAGNOSIS — R06.02 SOB (SHORTNESS OF BREATH): ICD-10-CM

## 2020-05-18 DIAGNOSIS — Z17.0 MALIGNANT NEOPLASM OF UPPER-OUTER QUADRANT OF RIGHT BREAST IN FEMALE, ESTROGEN RECEPTOR POSITIVE: ICD-10-CM

## 2020-05-18 DIAGNOSIS — N64.4 CHRONIC PAIN OF BREAST: ICD-10-CM

## 2020-05-18 DIAGNOSIS — C50.411 MALIGNANT NEOPLASM OF UPPER-OUTER QUADRANT OF RIGHT BREAST IN FEMALE, ESTROGEN RECEPTOR POSITIVE: ICD-10-CM

## 2020-05-18 PROCEDURE — 71275 CT ANGIOGRAPHY CHEST: CPT | Mod: TC

## 2020-05-18 PROCEDURE — 72074 X-RAY EXAM THORAC SPINE4/>VW: CPT | Mod: TC

## 2020-05-18 PROCEDURE — 72050 X-RAY EXAM NECK SPINE 4/5VWS: CPT | Mod: TC

## 2020-05-18 PROCEDURE — 25500020 PHARM REV CODE 255: Performed by: INTERNAL MEDICINE

## 2020-05-18 RX ADMIN — IOHEXOL 100 ML: 350 INJECTION, SOLUTION INTRAVENOUS at 12:05

## 2020-05-18 NOTE — TELEPHONE ENCOUNTER
----- Message from Safia Clemente sent at 5/18/2020 10:52 AM CDT -----  Contact: pt  Type:  Needs Medical Advice    Who Called: ERLINDA ARREAGA   Symptoms (please be specific): pt is having pain in her right breast and a lot of drainage   How long has patient had these symptoms:  05/15/2020  Pharmacy name and phone #:    Would the patient rather a call back or a response via [facility]? call  Best Call Back Number:  [phone]  Additional Information:

## 2020-05-21 ENCOUNTER — TELEPHONE (OUTPATIENT)
Dept: PSYCHOLOGY | Facility: HOSPITAL | Age: 70
End: 2020-05-21

## 2020-05-21 RX ORDER — CLONIDINE HYDROCHLORIDE 0.1 MG/1
TABLET ORAL
Qty: 270 TABLET | Refills: 1 | Status: SHIPPED | OUTPATIENT
Start: 2020-05-21 | End: 2020-08-06

## 2020-05-21 NOTE — TELEPHONE ENCOUNTER
Attempted to contact patient for our schedule outpatient virtual visit at 4pm today. No answer left voicemail. Will reach out to reschedule appointment.

## 2020-05-25 ENCOUNTER — TELEPHONE (OUTPATIENT)
Dept: PHARMACY | Facility: CLINIC | Age: 70
End: 2020-05-25

## 2020-05-25 DIAGNOSIS — Z17.0 MALIGNANT NEOPLASM OF UPPER-OUTER QUADRANT OF RIGHT BREAST IN FEMALE, ESTROGEN RECEPTOR POSITIVE: Chronic | ICD-10-CM

## 2020-05-25 DIAGNOSIS — C50.411 MALIGNANT NEOPLASM OF UPPER-OUTER QUADRANT OF RIGHT BREAST IN FEMALE, ESTROGEN RECEPTOR POSITIVE: Chronic | ICD-10-CM

## 2020-05-25 RX ORDER — TRAMADOL HYDROCHLORIDE 50 MG/1
TABLET ORAL
Qty: 50 TABLET | Refills: 0 | Status: SHIPPED | OUTPATIENT
Start: 2020-05-25 | End: 2020-05-25 | Stop reason: SDUPTHER

## 2020-05-25 RX ORDER — TRAMADOL HYDROCHLORIDE 50 MG/1
50 TABLET ORAL EVERY 6 HOURS PRN
Qty: 50 TABLET | Refills: 0 | Status: SHIPPED | OUTPATIENT
Start: 2020-05-25 | End: 2020-07-01 | Stop reason: SDUPTHER

## 2020-05-25 NOTE — TELEPHONE ENCOUNTER
----- Message from Kristy Peterson sent at 5/25/2020 10:57 AM CDT -----  Contact: [facility]  Tramadol, over a 7 day supply, needs to documented that it is  medically necessary for over 7 days, please call them back at [phone]. Thank you

## 2020-05-25 NOTE — TELEPHONE ENCOUNTER
[facility] calling in regards to a Letrozole refill. The patient stated Dr. Huynh took her off the Letrozle at the moment. She have an appt. on 5/29 with the MD. [facility] will follow up with the patient in the evening or Monday morning.

## 2020-05-26 ENCOUNTER — TELEPHONE (OUTPATIENT)
Dept: INFUSION THERAPY | Facility: HOSPITAL | Age: 70
End: 2020-05-26

## 2020-05-27 ENCOUNTER — TELEPHONE (OUTPATIENT)
Dept: INFUSION THERAPY | Facility: HOSPITAL | Age: 70
End: 2020-05-27

## 2020-05-28 NOTE — PROGRESS NOTES
Subjective:       Patient ID: Susu Luther is a 70 y.o. female.    Chief Complaint: Malignant neoplasm of upper-outer quadrant of right breast in female, estrogen receptor positive [C50.411, Z17.0]  HPI: We have an opportunity to see Ms. Susu Luther in Hematology Oncology clinic at [facility] on 05/28/2020.  Ms. Susu Luther is a 70 y.o. woman with pT2N0 invasive lobular breast cancer ER positive IN negative Her2 negative s/p lumpectomy and sentinel node biopsy.       Was found right breast mass on self breast exam.  Screening mammogram on 2/26/2019 showed Impression:  Right  Mass: Right breast mass at the lower outer middle position. Assessment: 0 - Incomplete. Special Views: Spot Compression View and Ultrasound are recommended.      Left  There is no mammographic evidence of malignancy.     On March 4, 2019, underwent US guided biopsy showed FINAL PATHOLOGIC DIAGNOSIS  1. BREAST, RIGHT, LOWER OUTER 08:00, ULTRASOUND-GUIDED BIOPSY:  Invasive lobular carcinoma of breast.  Size of invasive carcinoma: 19 mm in greatest linear dimension within a single core biopsy fragment.  [place] Histologic Score: Grade 2 of 3.  Tubule formation: 3  Nuclear pleomorphism: 2  Mitoses: 1  Associated lobular carcinoma in situ (LCIS) is present.  No lymphovascular or perineural invasion.  Breast biomarkers are pending and will be included in the supplemental report.  2. AXILLA, RIGHT LYMPH NODES, ULTRASOUND-GUIDED BIOPSY:  Benign lymph node without evidence of metastatic carcinoma.  Immunohistochemical stains (Cam 5.2 and CK7) are confirmatory.     On March 27, 2019 underwent lumpectomy with sentinel node.  Pathology showed   PROCEDURE: Excision/lumpectomy  SPECIMEN LATERALITY: Right breast  TUMOR SITE: 8 o'clock  TUMOR SIZE: Approximately 4.0 x 3.0 x 3.0 cm  HISTOLOGIC TYPE: Invasive lobular carcinoma  HISTOLOGIC GRADE: Grade 2 of 3  Tubular differentiation: 3  Nuclear pleomorphism: 2  Mitotic rate:  1  TUMOR FOCALITY: Single focus of invasive carcinoma  DUCTAL CARCINOMA IN SITU: Not identified  LOBULAR CARCINOMA IN SITU: Present  MARGINS:  Main specimen (#6) : Carcinoma present at superior, deep and medial margins  Distance from inferior margin: 5 mm  Distance from lateral margin: greater than 20 mm  Distance of anterior margin: 15 mm  Additionally submitted margins (specimen #7): Distance from newly designated margin: 4 mm  REGIONAL LYMPH NODES: Uninvolved tumor cells  Number of total lymph nodes examined: 8  Number of sentinel nodes examined: 4  TREATMENT EFFECT: No known presurgical therapy  LYMPH-VASCULAR INVASION: Not identified  ANCILLARY STUDIES (performed on patient's previous biopsy MS ):  ER: Positive (95% of tumor cells)  NH: Negative (less than 1 % of tumor cells)  Her2 by FISH: Negative (not amplified)  Ki-67%: Positive (70 % of tumor cells)  ADDITIONAL FINDINGS: Apocrine metaplasia, usual ductal hyperplasia, fibroadenomatoid change  PATHOLOGIC STAGE CLASSIFICATION: pT2 pN0     Oncotype type DX recurrence score 22, with distant recurrence risk 8%, absolute chemotherapy benefit <1%.     Completed adjuvant radiation.  Has right breast tenderness.  Started on anastrozole.  Has arthralgias. Could not tolerate.  AI was changed to letrozole, tolerating well.                    Has hot flashes and depression.  Cymbalta was started but had nausea diarrhea, could not tolerate. Currently on Effexor.  Hot flashes improved, but still uncomfortable.     Malignant neoplasm of upper-outer quadrant of right breast in female, estrogen receptor positive  Continue femara     Chronic pain of breast  MRI breast negative.  Due to radiation.  Advised to reestablish with Physical Therapy.  -     oxyCODONE-acetaminophen (PERCOCET) 7.5-325 mg per tablet; Take 1 tablet by mouth every 8 (eight) hours as needed for Pain.  Dispense: 30 tablet; Refill: 0     Anxiety  Continue on zoloft, effexor.  Will schedule psychiatry  referral virtual visit.  -     diazePAM (VALIUM) 10 MG Tab; Take 1 tablet (10 mg total) by mouth 2 (two) times daily as needed.  Dispense: 60 tablet; Refill: 1  -     sertraline (ZOLOFT) 50 MG tablet; Take 1 tablet (50 mg total) by mouth once daily.  Dispense: 90 tablet; Refill: 0     Malignant neoplasm of upper-outer quadrant of right breast in female, estrogen receptor positive  Comments:  Pt continue to see Hem/Onc  Orders:  -     diazePAM (VALIUM) 10 MG Tab; Take 1 tablet (10 mg total) by mouth 2 (two) times daily as needed.  Dispense: 60 tablet; Refill: 1     Dizziness  -     meclizine (ANTIVERT) 12.5 mg tablet; Take 1 tablet (12.5 mg total) by mouth 3 (three) times daily as needed.  Dispense: 90 tablet; Refill: 0     ? Due to femara.  If does not improve with meclizine, will get MRI brain.  Reports fell on Sunday, and has pain all over, has SOB, dizziness, and pain right breast.         Malignant neoplasm of upper-outer quadrant of right breast in female, estrogen receptor positive    3/14/2019 Initial Diagnosis     Malignant neoplasm of upper-outer quadrant of right breast in female, estrogen receptor positive      3/27/2019 Cancer Staged     Staging form: Breast, AJCC 8th Edition  - Pathologic stage from 3/27/2019: Stage IIA (pT2, pN0(sn), cM0, G2, ER+, MO-, HER2-) - Signed by Guido Huynh MD on 4/4/2019 5/20/2019 - 6/18/2019 Radiation Therapy     Treatment Site Ref. ID Energy Dose/Fx (Gy) #Fx Dose Correction (Gy) Total Dose (Gy) Start Date End Date Elapsed Days   Boost Boost 18X 2.5 4 / 4 0 10 6/13/2019 6/18/2019 5   RtBreastAddMV Rt Breast 18X/6X 2.66 16 / 16 0 42.56 5/20/2019 6/12/2019 23            Past Medical History:   Diagnosis Date    Encounter for blood transfusion     Hx of psychiatric care     Hypertension     Major depressive disorder, single episode, moderate with anxious distress 1/14/2020    Malignant neoplasm of upper-outer quadrant of right breast in female, estrogen receptor  positive 3/14/2019    radiation    Psychiatric problem     Sleep difficulties     Stroke 2009    no residual defect    Therapy      Family History   Problem Relation Age of Onset    Diabetes Maternal Grandmother     Diabetes Maternal Grandfather     Diabetes Mother     Breast cancer Neg Hx     Colon cancer Neg Hx     Ovarian cancer Neg Hx      Social History     Socioeconomic History    Marital status:      Spouse name: Campos Luther    Number of children: 2    Years of education: Not on file    Highest education level: Not on file   Occupational History    Not on file   Social Needs    Financial resource strain: Not on file    Food insecurity:     Worry: Not on file     Inability: Not on file    Transportation needs:     Medical: Not on file     Non-medical: Not on file   Tobacco Use    Smoking status: Never Smoker    Smokeless tobacco: Never Used   Substance and Sexual Activity    Alcohol use: No    Drug use: No    Sexual activity: Yes     Partners: Male     Birth control/protection: Post-menopausal   Lifestyle    Physical activity:     Days per week: Not on file     Minutes per session: Not on file    Stress: Not on file   Relationships    Social connections:     Talks on phone: Not on file     Gets together: Not on file     Attends [employer] service: Not on file     Active member of club or organization: Not on file     Attends meetings of clubs or organizations: Not on file     Relationship status: Not on file   Other Topics Concern    Patient feels they ought to cut down on drinking/drug use Not Asked    Patient annoyed by others criticizing their drinking/drug use Not Asked    Patient has felt bad or guilty about drinking/drug use Not Asked    Patient has had a drink/used drugs as an eye opener in the AM Not Asked   Social History Narrative    Not on file     Past Surgical History:   Procedure Laterality Date    APPENDECTOMY      BREAST BIOPSY      2019    BREAST  LUMPECTOMY      2019     SECTION      x 1    OOPHORECTOMY      right     SENTINEL LYMPH NODE BIOPSY Right 3/27/2019    Procedure: BIOPSY, LYMPH NODE, SENTINEL;  Surgeon: Artemio Starks MD;  Location: [facility];  Service: General;  Laterality: Right;     Current Outpatient Medications   Medication Sig Dispense Refill    albuterol (VENTOLIN HFA) 90 mcg/actuation inhaler Inhale 2 puffs into the lungs every 4 (four) hours as needed for Shortness of Breath. 18 g 11    aspirin (ECOTRIN) 81 MG EC tablet Take 1 tablet (81 mg total) by mouth once daily. (Patient taking differently: Take 81 mg by mouth every other day. )  0    atorvastatin (LIPITOR) 40 MG tablet Take 1 tablet (40 mg total) by mouth once daily. 90 tablet 3    bismuth subsalicylate (PEPTO BISMOL) 262 mg/15 mL suspension Take 15 mLs by mouth every 6 (six) hours as needed for Indigestion.      blood pressure kit med and lrg Kit Take blood pressure first thing in the morning. 1 each 0    butalbital-acetaminophen-caff -40 mg -40 mg Cap Take 1 capsule by mouth three times daily as needed 60 capsule 0    cholecalciferol, vitamin D3, 50,000 unit capsule Take 50,000 Units by mouth once a week.   0    cloNIDine (CATAPRES) 0.1 MG tablet TAKE 1 TABLET BY MOUTH THREE TIMES DAILY 270 tablet 1    diazePAM (VALIUM) 10 MG Tab Take 1 tablet (10 mg total) by mouth 2 (two) times daily as needed. 60 tablet 1    emollient combination no.63 (MIADERM) Lotn Apply 1 application topically 3 (three) times daily. (Patient taking differently: Apply 1 application topically 3 (three) times daily as needed. )      furosemide (LASIX) 20 MG tablet take 1/2 tablet by mouth a day. 90 tablet 0    gabapentin (NEURONTIN) 400 MG capsule       hydrALAZINE (APRESOLINE) 25 MG tablet Take 1 tablet (25 mg total) by mouth every 8 (eight) hours. 90 tablet 1    hydroCHLOROthiazide (HYDRODIURIL) 25 MG tablet Take 1 tablet (25 mg total) by mouth once daily. 90 tablet 3     hydroCHLOROthiazide (MICROZIDE) 12.5 mg capsule       inhalation spacing device (COMPACT SPACE CHAMBER) Use as directed for inhalation. 1 Device 0    letrozole (FEMARA) 2.5 mg Tab Take 1 tablet (2.5 mg total) by mouth once daily. 30 tablet 11    lidocaine (LIDODERM) 5 % Place 1 patch onto the skin daily as needed. Remove & Discard patch within 12 hours or as directed by MD 30 patch 0    lidocaine HCl-menthol 4-1 % PtMd Apply 1 patch topically daily as needed. 30 patch 6    lidocaine-prilocaine (EMLA) cream       losartan (COZAAR) 100 MG tablet Take 100 mg by mouth once daily.  3    meclizine (ANTIVERT) 12.5 mg tablet Take 1 tablet (12.5 mg total) by mouth 3 (three) times daily as needed. 90 tablet 0    multivitamin (ONE DAILY MULTIVITAMIN) per tablet Take 1 tablet by mouth once daily.      naproxen (NAPROSYN) 500 MG tablet Take 1 tablet (500 mg total) by mouth 2 (two) times daily with meals. 60 tablet 2    NARCAN 4 mg/actuation [place] CALL 911. ADMINISTER A SINGLE SPRAY INTRANASALLY INTO ONE NOSTRIL UPON SIGNS OF OPIOID OVERDOSE. MAY REPEAT AFTER 3 MINUTES IF NO RESPONSE.  0    OLANZapine (ZYPREXA) 10 MG tablet Take 10 mg by mouth nightly.      ondansetron (ZOFRAN-ODT) 8 MG TbDL Take 1 tablet (8 mg total) by mouth every 8 (eight) hours as needed (nausea). 60 tablet 1    oxyCODONE-acetaminophen (PERCOCET) 7.5-325 mg per tablet Take 1 tablet by mouth every 8 (eight) hours as needed for Pain. 30 tablet 0    prochlorperazine (COMPAZINE) 10 MG tablet Take 1 tablet (10 mg total) by mouth 4 (four) times daily as needed (nausea). 60 tablet 1    propranolol (INDERAL) 40 MG tablet Take 1 tablet (40 mg total) by mouth 2 (two) times daily. 60 tablet 11    sertraline (ZOLOFT) 50 MG tablet Take 1 tablet (50 mg total) by mouth once daily. 90 tablet 0    traMADoL (ULTRAM) 50 mg tablet Take 1 tablet (50 mg total) by mouth every 6 (six) hours as needed. 50 tablet 0    venlafaxine (EFFEXOR-XR) 150 MG Cp24 Take 1  capsule (150 mg total) by mouth once daily. 90 capsule 3     No current facility-administered medications for this visit.        Labs:  Lab Results   Component Value Date    WBC 7.27 12/16/2019    HGB 12.3 12/16/2019    HCT 39.4 12/16/2019    MCV 78 (L) 12/16/2019     12/16/2019     BMP  Lab Results   Component Value Date     03/03/2020    K 4.3 03/03/2020     03/03/2020    CO2 25 03/03/2020    BUN 22 03/03/2020    CREATININE 1.3 05/18/2020    CALCIUM 9.8 03/03/2020    ANIONGAP 8 03/03/2020    ESTGFRAFRICA 49 (A) 05/18/2020    EGFRNONAA 42 (A) 05/18/2020     Lab Results   Component Value Date    ALT 12 03/03/2020    AST 19 03/03/2020    ALKPHOS 100 03/03/2020    BILITOT 0.3 03/03/2020       Lab Results   Component Value Date    IRON 69 12/16/2019    TIBC 302 12/16/2019    FERRITIN 56 12/16/2019     Lab Results   Component Value Date    WANAAYNL92 994 (H) 12/16/2019     Lab Results   Component Value Date    FOLATE 3.9 (L) 12/16/2019     Lab Results   Component Value Date    TSH 1.170 12/16/2019       I have reviewed the radiology reports and examined the scan/xray images.    Review of Systems   Constitutional: Negative.    HENT: Negative.    Eyes: Negative.    Respiratory: Negative.    Cardiovascular: Negative.    Gastrointestinal: Negative.    Endocrine: Negative.    Genitourinary: Negative.    Musculoskeletal: Negative.    Skin: Negative.    Allergic/Immunologic: Negative.    Neurological: Negative.    Hematological: Negative.    Psychiatric/Behavioral: Negative.      ECOG SCORE    0 - Fully active-able to carry on all pre-disease performance without restriction            Objective:     Vitals:    05/29/20 1440   BP: (!) 133/90   Pulse: 63   Temp: 96.2 °F (35.7 °C)   Body mass index is 42.68 kg/m².  Physical Exam   Constitutional: She is oriented to person, place, and time. She appears well-developed and well-nourished.   HENT:   Head: Normocephalic and atraumatic.   Eyes: Conjunctivae and EOM  are normal.   Neck: Normal range of motion. Neck supple.   Cardiovascular: Normal rate and regular rhythm.   Pulmonary/Chest: Effort normal and breath sounds normal.   Abdominal: Soft. Bowel sounds are normal.   Musculoskeletal: Normal range of motion.   Neurological: She is alert and oriented to person, place, and time.   Skin: Skin is warm and dry.   Psychiatric: She has a normal mood and affect. Her behavior is normal. Judgment and thought content normal.   Nursing note and vitals reviewed.    CTA chest on 5/18/2020     Impression       1. Negative CTA chest.  No pulmonary embolism.  2. Minimal scarring, detailed above.  Otherwise, clear lungs.  3. Porcelain gallbladder.  4. Rim calcified splenic artery aneurysm, 3.7 cm.  5. Postsurgical changes posterior depth right breast with history of breast cancer.  No axillary adenopathy.  No evidence of distant metastatic disease.         Assessment:      1. Malignant neoplasm of upper-outer quadrant of right breast in female, estrogen receptor positive    2. Splenic artery aneurysm    3. Depression, unspecified depression type    4. Chronic pain of breast           Plan:     Malignant neoplasm of upper-outer quadrant of right breast in female, estrogen receptor positive  Resume letrozole  -     butalbital-acetaminophen-caff -40 mg -40 mg Cap; Take 1 capsule by mouth 3 (three) times daily as needed.  Dispense: 60 capsule; Refill: 0  -     letrozole (FEMARA) 2.5 mg Tab; Take 1 tablet (2.5 mg total) by mouth once daily.  Dispense: 30 tablet; Refill: 11  -     CBC auto differential; Future; Expected date: 07/29/2020  -     Comprehensive metabolic panel; Future; Expected date: 07/29/2020    Splenic artery aneurysm  -     Ambulatory referral/consult to Vascular Surgery; Future; Expected date: 06/05/2020    Depression, unspecified depression type  -     venlafaxine (EFFEXOR-XR) 150 MG Cp24; Take 1 capsule (150 mg total) by mouth once daily.  Dispense: 90 capsule;  Refill: 3    Chronic pain of breast  -     oxyCODONE-acetaminophen (PERCOCET) 7.5-325 mg per tablet; Take 1 tablet by mouth every 8 (eight) hours as needed for Pain.  Dispense: 30 tablet; Refill: 0

## 2020-05-29 ENCOUNTER — SOCIAL WORK (OUTPATIENT)
Dept: HEMATOLOGY/ONCOLOGY | Facility: CLINIC | Age: 70
End: 2020-05-29

## 2020-05-29 ENCOUNTER — OFFICE VISIT (OUTPATIENT)
Dept: HEMATOLOGY/ONCOLOGY | Facility: CLINIC | Age: 70
End: 2020-05-29
Payer: COMMERCIAL

## 2020-05-29 VITALS
DIASTOLIC BLOOD PRESSURE: 90 MMHG | BODY MASS INDEX: 42.68 KG/M2 | OXYGEN SATURATION: 98 % | HEART RATE: 63 BPM | WEIGHT: 289 LBS | TEMPERATURE: 96 F | SYSTOLIC BLOOD PRESSURE: 133 MMHG

## 2020-05-29 DIAGNOSIS — I72.8 SPLENIC ARTERY ANEURYSM: ICD-10-CM

## 2020-05-29 DIAGNOSIS — Z17.0 MALIGNANT NEOPLASM OF UPPER-OUTER QUADRANT OF RIGHT BREAST IN FEMALE, ESTROGEN RECEPTOR POSITIVE: Primary | Chronic | ICD-10-CM

## 2020-05-29 DIAGNOSIS — F32.A DEPRESSION, UNSPECIFIED DEPRESSION TYPE: ICD-10-CM

## 2020-05-29 DIAGNOSIS — G89.29 CHRONIC PAIN OF BREAST: ICD-10-CM

## 2020-05-29 DIAGNOSIS — N64.4 CHRONIC PAIN OF BREAST: ICD-10-CM

## 2020-05-29 DIAGNOSIS — C50.411 MALIGNANT NEOPLASM OF UPPER-OUTER QUADRANT OF RIGHT BREAST IN FEMALE, ESTROGEN RECEPTOR POSITIVE: Primary | Chronic | ICD-10-CM

## 2020-05-29 PROCEDURE — 1159F MED LIST DOCD IN RCRD: CPT | Mod: S$GLB,,, | Performed by: INTERNAL MEDICINE

## 2020-05-29 PROCEDURE — 3075F SYST BP GE 130 - 139MM HG: CPT | Mod: CPTII,S$GLB,, | Performed by: INTERNAL MEDICINE

## 2020-05-29 PROCEDURE — 1101F PT FALLS ASSESS-DOCD LE1/YR: CPT | Mod: CPTII,S$GLB,, | Performed by: INTERNAL MEDICINE

## 2020-05-29 PROCEDURE — 1101F PR PT FALLS ASSESS DOC 0-1 FALLS W/OUT INJ PAST YR: ICD-10-PCS | Mod: CPTII,S$GLB,, | Performed by: INTERNAL MEDICINE

## 2020-05-29 PROCEDURE — 3080F PR MOST RECENT DIASTOLIC BLOOD PRESSURE >= 90 MM HG: ICD-10-PCS | Mod: CPTII,S$GLB,, | Performed by: INTERNAL MEDICINE

## 2020-05-29 PROCEDURE — 1125F PR PAIN SEVERITY QUANTIFIED, PAIN PRESENT: ICD-10-PCS | Mod: S$GLB,,, | Performed by: INTERNAL MEDICINE

## 2020-05-29 PROCEDURE — 99214 PR OFFICE/OUTPT VISIT, EST, LEVL IV, 30-39 MIN: ICD-10-PCS | Mod: S$GLB,,, | Performed by: INTERNAL MEDICINE

## 2020-05-29 PROCEDURE — 99999 PR PBB SHADOW E&M-EST. PATIENT-LVL III: CPT | Mod: PBBFAC,,, | Performed by: INTERNAL MEDICINE

## 2020-05-29 PROCEDURE — 1125F AMNT PAIN NOTED PAIN PRSNT: CPT | Mod: S$GLB,,, | Performed by: INTERNAL MEDICINE

## 2020-05-29 PROCEDURE — 1159F PR MEDICATION LIST DOCUMENTED IN MEDICAL RECORD: ICD-10-PCS | Mod: S$GLB,,, | Performed by: INTERNAL MEDICINE

## 2020-05-29 PROCEDURE — 99999 PR PBB SHADOW E&M-EST. PATIENT-LVL III: ICD-10-PCS | Mod: PBBFAC,,, | Performed by: INTERNAL MEDICINE

## 2020-05-29 PROCEDURE — 3075F PR MOST RECENT SYSTOLIC BLOOD PRESS GE 130-139MM HG: ICD-10-PCS | Mod: CPTII,S$GLB,, | Performed by: INTERNAL MEDICINE

## 2020-05-29 PROCEDURE — 3080F DIAST BP >= 90 MM HG: CPT | Mod: CPTII,S$GLB,, | Performed by: INTERNAL MEDICINE

## 2020-05-29 PROCEDURE — 99214 OFFICE O/P EST MOD 30 MIN: CPT | Mod: S$GLB,,, | Performed by: INTERNAL MEDICINE

## 2020-05-29 RX ORDER — OXYCODONE AND ACETAMINOPHEN 7.5; 325 MG/1; MG/1
1 TABLET ORAL EVERY 8 HOURS PRN
Qty: 30 TABLET | Refills: 0 | Status: SHIPPED | OUTPATIENT
Start: 2020-05-29 | End: 2020-08-06 | Stop reason: SDUPTHER

## 2020-05-29 RX ORDER — LETROZOLE 2.5 MG/1
2.5 TABLET, FILM COATED ORAL DAILY
Qty: 30 TABLET | Refills: 11 | Status: SHIPPED | OUTPATIENT
Start: 2020-05-29 | End: 2020-11-19 | Stop reason: SDUPTHER

## 2020-05-29 RX ORDER — VENLAFAXINE HYDROCHLORIDE 150 MG/1
150 CAPSULE, EXTENDED RELEASE ORAL DAILY
Qty: 90 CAPSULE | Refills: 3 | Status: SHIPPED | OUTPATIENT
Start: 2020-05-29 | End: 2020-08-06

## 2020-05-29 RX ORDER — BUTALBITAL, ACETAMINOPHEN AND CAFFEINE 50; 325; 40 MG/1; MG/1; MG/1
1 CAPSULE ORAL 3 TIMES DAILY PRN
Qty: 60 CAPSULE | Refills: 0 | Status: SHIPPED | OUTPATIENT
Start: 2020-05-29 | End: 2020-07-22 | Stop reason: SDUPTHER

## 2020-06-02 ENCOUNTER — TELEPHONE (OUTPATIENT)
Dept: HEMATOLOGY/ONCOLOGY | Facility: CLINIC | Age: 70
End: 2020-06-02

## 2020-06-04 ENCOUNTER — TELEPHONE (OUTPATIENT)
Dept: HEMATOLOGY/ONCOLOGY | Facility: CLINIC | Age: 70
End: 2020-06-04

## 2020-06-04 ENCOUNTER — OFFICE VISIT (OUTPATIENT)
Dept: PSYCHIATRY | Facility: CLINIC | Age: 70
End: 2020-06-04
Payer: COMMERCIAL

## 2020-06-04 DIAGNOSIS — F32.1 MAJOR DEPRESSIVE DISORDER, SINGLE EPISODE, MODERATE WITH ANXIOUS DISTRESS: Primary | ICD-10-CM

## 2020-06-04 DIAGNOSIS — F54 PSYCHOLOGICAL FACTORS AFFECTING MEDICAL CONDITION: ICD-10-CM

## 2020-06-04 PROCEDURE — 99999 PR PBB SHADOW E&M-EST. PATIENT-LVL II: CPT | Mod: PBBFAC,,, | Performed by: PSYCHOLOGIST

## 2020-06-04 PROCEDURE — 99999 PR PBB SHADOW E&M-EST. PATIENT-LVL II: ICD-10-PCS | Mod: PBBFAC,,, | Performed by: PSYCHOLOGIST

## 2020-06-04 PROCEDURE — 90832 PR PSYCHOTHERAPY W/PATIENT, 30 MIN: ICD-10-PCS | Mod: S$GLB,,, | Performed by: PSYCHOLOGIST

## 2020-06-04 PROCEDURE — 90832 PSYTX W PT 30 MINUTES: CPT | Mod: S$GLB,,, | Performed by: PSYCHOLOGIST

## 2020-06-04 NOTE — Clinical Note
Hey there. I am still working with this patient on adherence, but she has limited insight. She equates the delay in uncovering her aneurysm to fault of the team instead of her not presenting to the ED. I am resume clinic visits with her in hope to improve her mood.

## 2020-06-04 NOTE — PROGRESS NOTES
INFORMED CONSENT: Susu Luther   is known to this provider and identity was confirmed via NAME and .  The patient has been informed of the risks and benefits associated with engaging in psychotherapy, the handling of protected health information, the rights of privacy and the limits of confidentiality. The patient has also been informed of the importance of reporting any suicidal or homicidal ideation to this or any provider to ensure safety of all parties, and the Susu Luther expressed understanding. The patient was agreeable to these terms and freely participates in individual psychotherapy.    Telemedicine PSYCHO-ONCOLOGY NOTE/ Individual Psychotherapy   (patient not on premises due to COVID-19 restrictions)    Consultation started: 2020 at 3:38 PM   The chief complaint leading to consultation is: anxiety; non adherence  The patient location is: Patient home  Virtual visit with synchronous audio only  Patient alone at the time of consultation    Crisis Disclaimer: Patient was informed that due to the virtual nature of the visit, that if a crisis develops, protocols will be implemented to ensure patient safety, including but not limited to: 1) Initiating a welfare check with local Law Enforcement, 2) Calling [facility]/National Crisis Hotline, and/or 3) Initiating PEC/CEC procedures.      Each patient provided medical services by telemedicine is:  (1) informed of the relationship between the physician and patient and the respective role of any other health care provider with respect to management of the patient; and (2) notified that he or she may decline to receive medical services by telemedicine and may withdraw from such care at any time.    Date: 2020   Site of therapist:  [facility]         Therapeutic Intervention: Met with patient.  Outpatient - Behavior modifying psychotherapy 30 min - CPT code 24823    Referring provider:    Chief complaint/reason for encounter: depression and  anxiety   Met with patient to evaluate psychosocial adaptation to survivorship of Breast Cancer    Patient was last seen by me on 5/12/2020    Objective:      Susu Luther was phone for session.  The patient was fully cooperative throughout the session.  Behavior/Cooperation: friendly and cooperative  Speech: normal in rate, volume, and tone and appropriate quality, quantity and organization of sentences  Mood: anxious  Affect: increased in intensity  Thought Process: goal-directed, logical  Thought Content: normal,  No delusions or paranoia; did not appear to be responding to internal stimuli during the session  Orientation: grossly intact  Memory: Grossly intact  Attention Span/Concentration: Attends to session without distraction; reports no difficulty  Fund of Knowledge: average  Estimate of Intelligence: average from verbal skills and history  Cognition: grossly intact  Insight: Poor  Judgment: Poor      Interval history and content of current session: Patient reported several instances of non adherence including not presenting to the ED when instructed by the medical team, no taking medications as prescribed, and not adequately reporting symptoms to medical team. Patient stated that she blames the team for not catching her aneurysm sooner and exhibited limited insight into her lack of adherence.    Discussed current adaptation to disease and treatment status. Reports to be coping with significant difficulty. Evaluated cognitive response, paying particular attention to negative intrusive thoughts of a persistent and detrimental nature. Thoughts of this type are in evidence with moderate distress. Identified and evaluated psychosocial and environmental stressors secondary to diagnosis and treatment.     Focused on providing cognitive behavioral therapy to address negative cognitions. Examined proactive behaviors that may be implemented to minimize or ameliorate psychosocial stressors secondary to  diagnosis and treatment.     Risk parameters:   Patient reports no suicidal ideation  Patient reports no homicidal ideation  Patient reports no self-injurious behavior  Patient reports no violent behavior   Safety needs:  None at this time      Verbal deficits: None     Patient's response to intervention:The patient's response to intervention is guarded.     Progress toward goals and other mental status changes:  The patient's progress toward goals is limited.      Progress to date:No Progress - Continue Objectives      Goals from last visit: Not attempted                Client Strengths: verbal, intelligent, successful, good social support, good insight, commitment to wellness, strong alexsander, strong cultural traditions      ICD-10-CM ICD-9-CM   1. Major depressive disorder, single episode, moderate with anxious distress F32.1 296.22   2. Psychological factors affecting medical condition F54 316        Treatment Plan:individual psychotherapy and medication management by physician  · Target symptoms: depression, anxiety , non adherence  · Why chosen therapy is appropriate versus another modality: relevant to diagnosis, evidence based practice  · Outcome monitoring methods: self-report, observation, checklist/rating scale  · Therapeutic intervention type: behavior modifying psychotherapy  · Prognosis: Fair      Behavioral goals:    Exercise:   Stress management: Follow recommendation of medical team regarding medication dn follow up appointment. Implored patient to present to ED when directed to by team.   Social engagement:   Nutrition:   Smoking Cessation:   Therapy:    Return to clinic: 2 weeks; Next appointment will occur on June 16 at 0930 via Clinic to Clinic Telehealth; Patient symptoms are not improving with audio visits and we will resume video visits.      Length of Service (minutes direct face-to-face contact): 30          Cecily Aviles PhD  [place] License #[id number]

## 2020-06-04 NOTE — TELEPHONE ENCOUNTER
TIM was consulted by Dr. Aviles to schedule a telepsych visit in person for pt on June 16. TIM scheduled the appt as requested.     TIM will f/u day before for a reminder phone call.

## 2020-06-11 NOTE — PROGRESS NOTES
SW met with pt to f/u from previous encounter. 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 Pt explained tht dr shared she was tania to be alive because it has not ruptured. Pt contacted her family members to share the upsetting news. SW and pt discussed her alexsander throughout her entire journey. Pt requested a telepsych visit. SW booked the appt.     F/u as needs arise.

## 2020-06-12 ENCOUNTER — PATIENT OUTREACH (OUTPATIENT)
Dept: ADMINISTRATIVE | Facility: OTHER | Age: 70
End: 2020-06-12

## 2020-06-12 DIAGNOSIS — Z12.11 ENCOUNTER FOR FIT (FECAL IMMUNOCHEMICAL TEST) SCREENING: Primary | ICD-10-CM

## 2020-06-12 NOTE — PROGRESS NOTES
Patient's chart was reviewed.   Requested updates within Care Everywhere.  Immunizations reconciled.    Health Maintenance was updated.  Fit kit ordered

## 2020-06-14 NOTE — PROGRESS NOTES
Subjective:       Patient ID: Susu Luther is a 70 y.o. female.    Chief Complaint: Breast Cancer (mastodynia)    HPI:  Patient comes in today for follow-up of right breast pain and nipple discharge. Last visit with me was 2/19/20.  Patient presented with severe right breast pain in the lower quadrant of the right breast.  Right breast mammogram and ultrasound did not reveal any abnormalities.  At that time patient had episodes of nipple discharge that was spontaneous.  No discharge was elicited during her visit at that time.    Pt had bilateral breat MRI that was unrevealing 2/11/20. Pt referred back to surgeon - recommended pain mgt visit for evaluation. Pt had visit with Dr. Pina- compounded topical NSAID and neurotin prescribed.     Today patient reports that she has had 3-4 episodes of spontaneous right nipple discharge. Describes as a yellowish color.  Denies ever being bloody.  She is wearing a soft support bra that is a sports bra most of the time.  She has been wearing her lidocaine patch with no affective pain control.  She rates the discomfort in her right breast and shoulder region as severe. Has not had PT yet for shoulder due to a finding of a splenic artery that is scheduled to be repaired Monday June 22nd at [facility] by Dr. Bonds    MRI of bilateral breasts was performed on 02/11/2020 and revealed a stable 5 mm round mass at the 3 o'clock position in the left breast.  No change appreciated.    Right shoulder xray wnl 2/2020    Patient has a history of right breast cancer, invasive lobular, diagnosed March 4, 2019.  Stage T2 N0 invasive lobular.  ER positive DE negative her 2-negative nodes status post lumpectomy.      Oncotype recurrence score 22.  Completed adjuvant radiation June 18, 2019  Started Arimidex June 2019.   August 26, 2019 patient was switched to letrozole due to persistent nausea on Arimidex. Due off 6/2029- taking without difficulty    Patient reports persistent hot flashes.  She  is on Effexor 150 mg and takes clonidine for blood pressure which can also help with hot flashes.    September 2019 patient had bilateral mammogram that revealed right breast seroma in the area of lumpectomy.  January 9, 2020 patient had right mammogram and ultrasound for workup of persistent right breast pain.  No mention of seroma presents during this exam.  MRI of breast 2/22/20 wnl- no change in the 5 mm nodule left breast at 3 oclock    Onset of breast pain - right breast- late August - after radiation  Intermittent verses constant- some days worse than others- movement of right arm causes more sensitivity in right breast  Describes as- ache- nipple is hypersensitive.   Rates as- some days 9.5- usually 8-9  Exacerbating factor- movement of right side causes it to hurt worse   Relieving factor- lidocaine patches help and cold compresses  Any change in medications, caffeine use our physical activity- no change    Pt reports right nipple d/c - yellowish- spontaneous - August 2019- several times a month- nipple     Review of Systems   Constitutional: Negative.  Negative for chills and fever.   HENT: Negative.    Eyes: Negative.    Respiratory: Negative.    Cardiovascular: Negative.    Gastrointestinal: Negative.         No reflux   Endocrine: Negative.    Genitourinary: Negative.    Musculoskeletal: Negative.    Skin: Negative.    Allergic/Immunologic: Negative.    Neurological: Negative.    Hematological: Negative.  Negative for adenopathy.   Psychiatric/Behavioral: Negative.        Objective:      Physical Exam  Constitutional:       Appearance: She is well-developed.   HENT:      Head: Normocephalic and atraumatic.      Right Ear: External ear normal.      Left Ear: External ear normal.      Mouth/Throat:      Pharynx: No oropharyngeal exudate.   Eyes:      General: No scleral icterus.        Right eye: No discharge.         Left eye: No discharge.      Conjunctiva/sclera: Conjunctivae normal.       Pupils: Pupils are equal, round, and reactive to light.   Neck:      Musculoskeletal: Normal range of motion and neck supple.      Thyroid: No thyromegaly.   Cardiovascular:      Rate and Rhythm: Normal rate and regular rhythm.      Heart sounds: Normal heart sounds.   Pulmonary:      Effort: Pulmonary effort is normal.      Breath sounds: Normal breath sounds.   Chest:      Breasts:         Right: No inverted nipple, mass, nipple discharge, skin change or tenderness.         Left: No inverted nipple, mass, nipple discharge, skin change or tenderness.       Abdominal:      General: Bowel sounds are normal.      Palpations: Abdomen is soft.   Musculoskeletal: Normal range of motion.      Right shoulder: She exhibits no crepitus and normal strength.   Lymphadenopathy:      Head:      Right side of head: No submental, submandibular, tonsillar, preauricular, posterior auricular or occipital adenopathy.      Left side of head: No submental, submandibular, tonsillar, preauricular, posterior auricular or occipital adenopathy.      Cervical: No cervical adenopathy.      Right cervical: No superficial or posterior cervical adenopathy.     Left cervical: No superficial or posterior cervical adenopathy.      Upper Body:      Right upper body: No supraclavicular adenopathy.      Left upper body: No supraclavicular adenopathy.   Skin:     General: Skin is warm and dry.      Coloration: Skin is not pale.      Findings: No erythema or rash.   Neurological:      Mental Status: She is alert and oriented to person, place, and time.      Deep Tendon Reflexes: Reflexes are normal and symmetric.   Psychiatric:         Behavior: Behavior normal.         Thought Content: Thought content normal.         Judgment: Judgment normal.           Assessment:       1. Malignant neoplasm of upper-outer quadrant of right breast in female, estrogen receptor positive    2. Chronic pain of breast    3. Counseling and coordination of care    4.  Counseling on health promotion and disease prevention    5. Health education/counseling    6. Breast cancer screening        Plan:       1.     Right breast invasive lobular carcinoma, ER positive [place] negative her 2-, status post right lumpectomy with negative nodes. Status post completion adjuvant radiation.  Taking letrozole for 10 years. Due off 6/2029  2.     Right breast pain. Chronic. Negative imaging-mammogram and ultrasound.  MRI negative.  Right shoulder xray wnl. Clinical exam reveals no discrete palpable mass but exam rather limited due to patient's severe discomfort with palpation. Unable to elicit nipple discharge with compression.  3.     Right shoulder discomfort. Right shoulder x-ray wnl- needs f/u with ortho and PT  4.     Good support bra, cold or heat packs as needed.  Lidocaine patch.  Encouraged pt to f/u with physical med and PT after her splenic artery aneurysm repair- she agrees  5.     Allowed pt to verbalize frustration and fears- pt reassured - pt verbalized understanding

## 2020-06-16 ENCOUNTER — TELEPHONE (OUTPATIENT)
Dept: HEMATOLOGY/ONCOLOGY | Facility: CLINIC | Age: 70
End: 2020-06-16

## 2020-06-16 NOTE — TELEPHONE ENCOUNTER
contacted patient to inform patient of missed appointment for telepysch. Patient stated that she had forgotten about it and let me know about her appointment with NP, Vitaliy on tomorrow at 7:30 being too early. Pt explained that she will be having scans done at [facility] to determine size of aneurysm may be increased. Patient requested a later appointment time for tomorrow with [place]. SW offered new time for telepsych (June 23 at 9:30am) and Vitaliy (June 17 at 3:30pm). Pt verbalized consent.    F/u as needs arise.

## 2020-06-16 NOTE — TELEPHONE ENCOUNTER
called and left patient a VM about her appt scheduled for this morning. Sw did not receive an answer.

## 2020-06-17 ENCOUNTER — OFFICE VISIT (OUTPATIENT)
Dept: HEMATOLOGY/ONCOLOGY | Facility: CLINIC | Age: 70
End: 2020-06-17
Payer: COMMERCIAL

## 2020-06-17 VITALS
BODY MASS INDEX: 43.36 KG/M2 | DIASTOLIC BLOOD PRESSURE: 93 MMHG | TEMPERATURE: 98 F | HEART RATE: 64 BPM | SYSTOLIC BLOOD PRESSURE: 124 MMHG | WEIGHT: 293 LBS

## 2020-06-17 DIAGNOSIS — Z17.0 MALIGNANT NEOPLASM OF UPPER-OUTER QUADRANT OF RIGHT BREAST IN FEMALE, ESTROGEN RECEPTOR POSITIVE: Primary | Chronic | ICD-10-CM

## 2020-06-17 DIAGNOSIS — Z71.89 COUNSELING AND COORDINATION OF CARE: ICD-10-CM

## 2020-06-17 DIAGNOSIS — Z71.89 COUNSELING ON HEALTH PROMOTION AND DISEASE PREVENTION: ICD-10-CM

## 2020-06-17 DIAGNOSIS — C50.411 MALIGNANT NEOPLASM OF UPPER-OUTER QUADRANT OF RIGHT BREAST IN FEMALE, ESTROGEN RECEPTOR POSITIVE: Primary | Chronic | ICD-10-CM

## 2020-06-17 DIAGNOSIS — Z12.39 BREAST CANCER SCREENING: ICD-10-CM

## 2020-06-17 DIAGNOSIS — Z71.9 HEALTH EDUCATION/COUNSELING: ICD-10-CM

## 2020-06-17 DIAGNOSIS — G89.29 CHRONIC PAIN OF BREAST: ICD-10-CM

## 2020-06-17 DIAGNOSIS — N64.4 CHRONIC PAIN OF BREAST: ICD-10-CM

## 2020-06-17 PROCEDURE — 1101F PR PT FALLS ASSESS DOC 0-1 FALLS W/OUT INJ PAST YR: ICD-10-PCS | Mod: CPTII,S$GLB,, | Performed by: NURSE PRACTITIONER

## 2020-06-17 PROCEDURE — 3074F PR MOST RECENT SYSTOLIC BLOOD PRESSURE < 130 MM HG: ICD-10-PCS | Mod: CPTII,S$GLB,, | Performed by: NURSE PRACTITIONER

## 2020-06-17 PROCEDURE — 1125F AMNT PAIN NOTED PAIN PRSNT: CPT | Mod: S$GLB,,, | Performed by: NURSE PRACTITIONER

## 2020-06-17 PROCEDURE — 3080F DIAST BP >= 90 MM HG: CPT | Mod: CPTII,S$GLB,, | Performed by: NURSE PRACTITIONER

## 2020-06-17 PROCEDURE — 3080F PR MOST RECENT DIASTOLIC BLOOD PRESSURE >= 90 MM HG: ICD-10-PCS | Mod: CPTII,S$GLB,, | Performed by: NURSE PRACTITIONER

## 2020-06-17 PROCEDURE — 1159F MED LIST DOCD IN RCRD: CPT | Mod: S$GLB,,, | Performed by: NURSE PRACTITIONER

## 2020-06-17 PROCEDURE — 99214 OFFICE O/P EST MOD 30 MIN: CPT | Mod: S$GLB,,, | Performed by: NURSE PRACTITIONER

## 2020-06-17 PROCEDURE — 99999 PR PBB SHADOW E&M-EST. PATIENT-LVL II: ICD-10-PCS | Mod: PBBFAC,,, | Performed by: NURSE PRACTITIONER

## 2020-06-17 PROCEDURE — 1125F PR PAIN SEVERITY QUANTIFIED, PAIN PRESENT: ICD-10-PCS | Mod: S$GLB,,, | Performed by: NURSE PRACTITIONER

## 2020-06-17 PROCEDURE — 3074F SYST BP LT 130 MM HG: CPT | Mod: CPTII,S$GLB,, | Performed by: NURSE PRACTITIONER

## 2020-06-17 PROCEDURE — 99214 PR OFFICE/OUTPT VISIT, EST, LEVL IV, 30-39 MIN: ICD-10-PCS | Mod: S$GLB,,, | Performed by: NURSE PRACTITIONER

## 2020-06-17 PROCEDURE — 99999 PR PBB SHADOW E&M-EST. PATIENT-LVL II: CPT | Mod: PBBFAC,,, | Performed by: NURSE PRACTITIONER

## 2020-06-17 PROCEDURE — 1159F PR MEDICATION LIST DOCUMENTED IN MEDICAL RECORD: ICD-10-PCS | Mod: S$GLB,,, | Performed by: NURSE PRACTITIONER

## 2020-06-17 PROCEDURE — 1101F PT FALLS ASSESS-DOCD LE1/YR: CPT | Mod: CPTII,S$GLB,, | Performed by: NURSE PRACTITIONER

## 2020-06-17 RX ORDER — OFLOXACIN 3 MG/ML
SOLUTION/ DROPS OPHTHALMIC
COMMUNITY
Start: 2020-06-03 | End: 2020-11-02

## 2020-06-22 ENCOUNTER — TELEPHONE (OUTPATIENT)
Dept: HEMATOLOGY/ONCOLOGY | Facility: CLINIC | Age: 70
End: 2020-06-22

## 2020-06-22 NOTE — TELEPHONE ENCOUNTER
SW called to remind pt of her telepsych appt tmrw; however, pt did not answer so a VM was left instead.

## 2020-06-24 ENCOUNTER — DOCUMENTATION ONLY (OUTPATIENT)
Dept: PSYCHIATRY | Facility: CLINIC | Age: 70
End: 2020-06-24

## 2020-06-24 ENCOUNTER — TELEPHONE (OUTPATIENT)
Dept: PAIN MEDICINE | Facility: CLINIC | Age: 70
End: 2020-06-24

## 2020-06-24 RX ORDER — GABAPENTIN 600 MG/1
TABLET ORAL
Qty: 120 TABLET | Refills: 11 | Status: SHIPPED | OUTPATIENT
Start: 2020-06-24 | End: 2020-08-06

## 2020-06-24 NOTE — TELEPHONE ENCOUNTER
Patient agrees to increasing gabapentin   Sent a request for RX to Dr. Edwards for the [facility] on [address] [facility]  Jaimee

## 2020-06-24 NOTE — TELEPHONE ENCOUNTER
----- Message from Barry Edwarsd MD sent at 6/24/2020  3:34 PM CDT -----  Contact: pt  I would recommend adjusting the gabapentin more than anything due to risks with that recent surgery. If she is currently taking the 600mg 3x daily, I would adjust to 600/600/1200 (AM/Mid day/PM)    Barry Edwards MD  Interventional Pain Medicine  [facility] [place]    ----- Message -----  From: Jaimee Cervantes MA  Sent: 6/24/2020  11:11 AM CDT  To: Barry Edwards MD    Please advise    Patient had aneurysm surgery about a week ago and she is having bad pain in left arm at this time   Would we be able to send her something for pain at this time//dp  ----- Message -----  From: Catarina Payan  Sent: 6/24/2020   9:18 AM CDT  To: Silvana Zamora Staff    Type:  Needs Medical Advice    Who Called: pt  Symptoms (please be specific): left arm pain / can't move arm / b/p high   How long has patient had these symptoms:  n/a  Pharmacy name and phone #:  n/a  Would the patient rather a call back or a response via [facility]? Call back  Best Call Back Number: [phone] or [phone]  Additional Information: n/a

## 2020-06-26 DIAGNOSIS — C50.411 MALIGNANT NEOPLASM OF UPPER-OUTER QUADRANT OF RIGHT BREAST IN FEMALE, ESTROGEN RECEPTOR POSITIVE: Primary | Chronic | ICD-10-CM

## 2020-06-26 DIAGNOSIS — Z17.0 MALIGNANT NEOPLASM OF UPPER-OUTER QUADRANT OF RIGHT BREAST IN FEMALE, ESTROGEN RECEPTOR POSITIVE: Primary | Chronic | ICD-10-CM

## 2020-06-29 ENCOUNTER — TELEPHONE (OUTPATIENT)
Dept: PHARMACY | Facility: CLINIC | Age: 70
End: 2020-06-29

## 2020-06-29 ENCOUNTER — TELEPHONE (OUTPATIENT)
Dept: PAIN MEDICINE | Facility: CLINIC | Age: 70
End: 2020-06-29

## 2020-06-29 NOTE — TELEPHONE ENCOUNTER
Informed diana gabapentin instructions are Route: Take 1 tablet (600 mg total) by mouth once daily AND 1 tablet (600 mg total) after lunch AND 2 tablets (1,200 mg total) every evening. She understood. All questions answered.

## 2020-06-29 NOTE — TELEPHONE ENCOUNTER
Refill call regarding Letrozole at [facility].  Called patient and she stated she recently had an aneurysm removed and is at home recovering.  She stated that she has half a bottle of Letrozole on hand at the moment and requested [facility] give her a call back in a couple of weeks.  Will follow up 7/8 to schedule next refill.

## 2020-06-29 NOTE — TELEPHONE ENCOUNTER
----- Message from Kayleigh Gonzales sent at 6/29/2020  9:34 AM CDT -----  Contact: Bradly  Type:  Pharmacy Calling to Clarify an RX    Name of Caller:Kayleigh  Pharmacy Name:[facility]  Prescription Name:gabapentin (NEURONTIN) 600 MG tablet  What do they need to clarify?:directions  Best Call Back Number:[phone]  Additional Information:

## 2020-06-30 PROCEDURE — G0180 PR HOME HEALTH MD CERTIFICATION: ICD-10-PCS | Mod: ,,, | Performed by: INTERNAL MEDICINE

## 2020-06-30 PROCEDURE — G0180 MD CERTIFICATION HHA PATIENT: HCPCS | Mod: ,,, | Performed by: INTERNAL MEDICINE

## 2020-07-01 DIAGNOSIS — Z17.0 MALIGNANT NEOPLASM OF UPPER-OUTER QUADRANT OF RIGHT BREAST IN FEMALE, ESTROGEN RECEPTOR POSITIVE: Chronic | ICD-10-CM

## 2020-07-01 DIAGNOSIS — C50.411 MALIGNANT NEOPLASM OF UPPER-OUTER QUADRANT OF RIGHT BREAST IN FEMALE, ESTROGEN RECEPTOR POSITIVE: Chronic | ICD-10-CM

## 2020-07-01 RX ORDER — TRAMADOL HYDROCHLORIDE 50 MG/1
50 TABLET ORAL EVERY 6 HOURS PRN
Qty: 50 TABLET | Refills: 0 | Status: SHIPPED | OUTPATIENT
Start: 2020-07-01 | End: 2020-08-06

## 2020-07-07 ENCOUNTER — TELEPHONE (OUTPATIENT)
Dept: PEDIATRICS | Facility: CLINIC | Age: 70
End: 2020-07-07

## 2020-07-07 NOTE — TELEPHONE ENCOUNTER
----- Message from Kailee Kramer sent at 7/7/2020  2:09 PM CDT -----  Contact: Paula/Dr. Cloud office  Paula is requesting to get a copy of recent office notes fax to [phone] and or call her at [phone].    Thanks  Td

## 2020-07-08 ENCOUNTER — TELEPHONE (OUTPATIENT)
Dept: HEMATOLOGY/ONCOLOGY | Facility: CLINIC | Age: 70
End: 2020-07-08

## 2020-07-08 ENCOUNTER — TELEPHONE (OUTPATIENT)
Dept: PHARMACY | Facility: CLINIC | Age: 70
End: 2020-07-08

## 2020-07-09 ENCOUNTER — TELEPHONE (OUTPATIENT)
Dept: INTERNAL MEDICINE | Facility: CLINIC | Age: 70
End: 2020-07-09

## 2020-07-09 ENCOUNTER — EXTERNAL HOME HEALTH (OUTPATIENT)
Dept: HOME HEALTH SERVICES | Facility: HOSPITAL | Age: 70
End: 2020-07-09
Payer: COMMERCIAL

## 2020-07-09 ENCOUNTER — OFFICE VISIT (OUTPATIENT)
Dept: INTERNAL MEDICINE | Facility: CLINIC | Age: 70
End: 2020-07-09
Payer: COMMERCIAL

## 2020-07-09 VITALS
DIASTOLIC BLOOD PRESSURE: 89 MMHG | HEART RATE: 55 BPM | HEIGHT: 69 IN | SYSTOLIC BLOOD PRESSURE: 134 MMHG | BODY MASS INDEX: 43.4 KG/M2 | OXYGEN SATURATION: 99 % | WEIGHT: 293 LBS | TEMPERATURE: 97 F

## 2020-07-09 DIAGNOSIS — H53.8 BLURRY VISION: ICD-10-CM

## 2020-07-09 DIAGNOSIS — R42 DIZZINESS: ICD-10-CM

## 2020-07-09 DIAGNOSIS — I10 ESSENTIAL HYPERTENSION: ICD-10-CM

## 2020-07-09 DIAGNOSIS — I63.00 CEREBROVASCULAR ACCIDENT (CVA) DUE TO THROMBOSIS OF PRECEREBRAL ARTERY: ICD-10-CM

## 2020-07-09 DIAGNOSIS — F32.1 MAJOR DEPRESSIVE DISORDER, SINGLE EPISODE, MODERATE WITH ANXIOUS DISTRESS: Primary | ICD-10-CM

## 2020-07-09 DIAGNOSIS — G47.33 OSA (OBSTRUCTIVE SLEEP APNEA): Chronic | ICD-10-CM

## 2020-07-09 PROCEDURE — 3075F PR MOST RECENT SYSTOLIC BLOOD PRESS GE 130-139MM HG: ICD-10-PCS | Mod: CPTII,S$GLB,, | Performed by: FAMILY MEDICINE

## 2020-07-09 PROCEDURE — 3079F PR MOST RECENT DIASTOLIC BLOOD PRESSURE 80-89 MM HG: ICD-10-PCS | Mod: CPTII,S$GLB,, | Performed by: FAMILY MEDICINE

## 2020-07-09 PROCEDURE — 1159F PR MEDICATION LIST DOCUMENTED IN MEDICAL RECORD: ICD-10-PCS | Mod: S$GLB,,, | Performed by: FAMILY MEDICINE

## 2020-07-09 PROCEDURE — 1126F AMNT PAIN NOTED NONE PRSNT: CPT | Mod: S$GLB,,, | Performed by: FAMILY MEDICINE

## 2020-07-09 PROCEDURE — 3008F PR BODY MASS INDEX (BMI) DOCUMENTED: ICD-10-PCS | Mod: CPTII,S$GLB,, | Performed by: FAMILY MEDICINE

## 2020-07-09 PROCEDURE — 1159F MED LIST DOCD IN RCRD: CPT | Mod: S$GLB,,, | Performed by: FAMILY MEDICINE

## 2020-07-09 PROCEDURE — 3079F DIAST BP 80-89 MM HG: CPT | Mod: CPTII,S$GLB,, | Performed by: FAMILY MEDICINE

## 2020-07-09 PROCEDURE — 1101F PR PT FALLS ASSESS DOC 0-1 FALLS W/OUT INJ PAST YR: ICD-10-PCS | Mod: CPTII,S$GLB,, | Performed by: FAMILY MEDICINE

## 2020-07-09 PROCEDURE — 99214 PR OFFICE/OUTPT VISIT, EST, LEVL IV, 30-39 MIN: ICD-10-PCS | Mod: S$GLB,,, | Performed by: FAMILY MEDICINE

## 2020-07-09 PROCEDURE — 3075F SYST BP GE 130 - 139MM HG: CPT | Mod: CPTII,S$GLB,, | Performed by: FAMILY MEDICINE

## 2020-07-09 PROCEDURE — 1101F PT FALLS ASSESS-DOCD LE1/YR: CPT | Mod: CPTII,S$GLB,, | Performed by: FAMILY MEDICINE

## 2020-07-09 PROCEDURE — 1126F PR PAIN SEVERITY QUANTIFIED, NO PAIN PRESENT: ICD-10-PCS | Mod: S$GLB,,, | Performed by: FAMILY MEDICINE

## 2020-07-09 PROCEDURE — 3008F BODY MASS INDEX DOCD: CPT | Mod: CPTII,S$GLB,, | Performed by: FAMILY MEDICINE

## 2020-07-09 PROCEDURE — 99214 OFFICE O/P EST MOD 30 MIN: CPT | Mod: S$GLB,,, | Performed by: FAMILY MEDICINE

## 2020-07-09 PROCEDURE — 99999 PR PBB SHADOW E&M-EST. PATIENT-LVL V: ICD-10-PCS | Mod: PBBFAC,,, | Performed by: FAMILY MEDICINE

## 2020-07-09 PROCEDURE — 99999 PR PBB SHADOW E&M-EST. PATIENT-LVL V: CPT | Mod: PBBFAC,,, | Performed by: FAMILY MEDICINE

## 2020-07-09 RX ORDER — ERYTHROMYCIN 5 MG/G
OINTMENT OPHTHALMIC
Status: ON HOLD | COMMUNITY
Start: 2020-06-03 | End: 2021-03-01 | Stop reason: CLARIF

## 2020-07-09 RX ORDER — CLONIDINE HYDROCHLORIDE 0.2 MG/1
0.2 TABLET ORAL 3 TIMES DAILY
Qty: 90 TABLET | Refills: 1 | Status: SHIPPED | OUTPATIENT
Start: 2020-07-09 | End: 2020-08-06 | Stop reason: SDUPTHER

## 2020-07-09 RX ORDER — VENLAFAXINE HYDROCHLORIDE 75 MG/1
75 CAPSULE, EXTENDED RELEASE ORAL DAILY
COMMUNITY
Start: 2020-05-08 | End: 2020-08-06 | Stop reason: SDUPTHER

## 2020-07-09 RX ORDER — PREDNISOLONE ACETATE 10 MG/ML
SUSPENSION/ DROPS OPHTHALMIC
COMMUNITY
Start: 2020-06-03 | End: 2020-12-28

## 2020-07-09 RX ORDER — BLOOD-GLUCOSE METER
KIT MISCELLANEOUS
Qty: 1 EACH | Refills: 0 | COMMUNITY
Start: 2020-07-09 | End: 2022-02-15 | Stop reason: ALTCHOICE

## 2020-07-09 NOTE — PROGRESS NOTES
Subjective:       Patient ID: Susu Luther is a 70 y.o. female.    Chief Complaint: Medication Refill (also requesting home health)    Pt is a 70 year old who is here for follow-up. Pt is still having dizziness with blurry vision. At times her blood pressure is fine and pt still having some of these issues.     Review of Systems   Constitutional: Negative.    Respiratory: Negative.    Cardiovascular: Negative.    Genitourinary: Negative.    Psychiatric/Behavioral: Negative.          Objective:      Physical Exam  Constitutional:       Appearance: Normal appearance.   Cardiovascular:      Rate and Rhythm: Normal rate and regular rhythm.      Pulses: Normal pulses.      Heart sounds: Normal heart sounds.   Abdominal:      General: Abdomen is flat.      Palpations: Abdomen is soft.   Skin:     General: Skin is warm and dry.   Neurological:      General: No focal deficit present.      Mental Status: She is alert and oriented to person, place, and time.   Psychiatric:         Mood and Affect: Mood normal.         Behavior: Behavior normal.         Assessment:       1. Major depressive disorder, single episode, moderate with anxious distress    2. Essential hypertension    3. NILS (obstructive sleep apnea)    4. Cerebrovascular accident (CVA) due to thrombosis of precerebral artery    5. Blurry vision    6. Dizziness        Plan:       Major depressive disorder, single episode, moderate with anxious distress    Essential hypertension  Comments:  Will increase the clonidine to  .2 mg tid.   Orders:  -     blood pressure kit med and lrg Kit; Check blood pressure every morning  Dispense: 1 each; Refill: 0    NILS (obstructive sleep apnea)    Cerebrovascular accident (CVA) due to thrombosis of precerebral artery  Comments:  Pt has had a prior CVA.     Blurry vision  -     Ambulatory referral/consult to Neurology; Future; Expected date: 07/16/2020    Dizziness  -     Ambulatory referral/consult to Neurology; Future;  Expected date: 07/16/2020

## 2020-07-09 NOTE — TELEPHONE ENCOUNTER
This is a fax number provided, but no name or affiliate name mentioned.  Will not send patient information to an unknown source.

## 2020-07-09 NOTE — TELEPHONE ENCOUNTER
----- Message from Haley Gloria sent at 7/9/2020  2:18 PM CDT -----  Regarding:  [facility]  Would like a call from  ; would also like to have pts progress  notes faxed over to [phone]; please call back at [phone].       Thank You,   Haley Gloria

## 2020-07-13 ENCOUNTER — DOCUMENT SCAN (OUTPATIENT)
Dept: HOME HEALTH SERVICES | Facility: HOSPITAL | Age: 70
End: 2020-07-13
Payer: MEDICARE

## 2020-07-17 ENCOUNTER — PATIENT OUTREACH (OUTPATIENT)
Dept: ADMINISTRATIVE | Facility: HOSPITAL | Age: 70
End: 2020-07-17

## 2020-07-17 NOTE — PROGRESS NOTES
Pre Visit Chart Audit Complete: Phoned pt, left message reminding of appt tomorrow with Dr. Rojas.     Health Maintenance: Updated  Immunizations: Abstracted  Care Everywhere: Abstracted  [facility] Search: Abstracted    Health Maintenance Due   Topic    Colorectal Cancer Screening Awaiting fitkit back    TETANUS VACCINE     Aspirin/Antiplatelet Therapy     High Dose Statin Need rx refill    DEXA SCAN DUE    Shingles Vaccine (1 of 2)    Pneumococcal Vaccine (65+ High/Highest Risk) (1 of 2 - PCV13)    Lipid Panel         Manually entered labs: HCV Ab 12/26/2018    Care Team: Updated   Digital Medicine:N/A   OPCM: N/A   AWV: N/A

## 2020-07-18 ENCOUNTER — OFFICE VISIT (OUTPATIENT)
Dept: INTERNAL MEDICINE | Facility: CLINIC | Age: 70
End: 2020-07-18
Payer: COMMERCIAL

## 2020-07-18 ENCOUNTER — HOSPITAL ENCOUNTER (OUTPATIENT)
Dept: RADIOLOGY | Facility: HOSPITAL | Age: 70
Discharge: HOME | End: 2020-07-18
Attending: FAMILY MEDICINE
Payer: COMMERCIAL

## 2020-07-18 VITALS
BODY MASS INDEX: 43.4 KG/M2 | WEIGHT: 293 LBS | HEIGHT: 69 IN | SYSTOLIC BLOOD PRESSURE: 139 MMHG | TEMPERATURE: 96 F | OXYGEN SATURATION: 97 % | DIASTOLIC BLOOD PRESSURE: 89 MMHG | HEART RATE: 80 BPM

## 2020-07-18 DIAGNOSIS — G89.29 CHRONIC LEFT SHOULDER PAIN: ICD-10-CM

## 2020-07-18 DIAGNOSIS — I10 ESSENTIAL HYPERTENSION: Primary | ICD-10-CM

## 2020-07-18 DIAGNOSIS — M25.512 CHRONIC LEFT SHOULDER PAIN: ICD-10-CM

## 2020-07-18 DIAGNOSIS — I10 UNCONTROLLED HYPERTENSION: ICD-10-CM

## 2020-07-18 DIAGNOSIS — H53.8 BLURRY VISION: ICD-10-CM

## 2020-07-18 PROCEDURE — 3075F SYST BP GE 130 - 139MM HG: CPT | Mod: CPTII,S$GLB,, | Performed by: FAMILY MEDICINE

## 2020-07-18 PROCEDURE — 99999 PR PBB SHADOW E&M-EST. PATIENT-LVL V: ICD-10-PCS | Mod: PBBFAC,,, | Performed by: FAMILY MEDICINE

## 2020-07-18 PROCEDURE — 99214 PR OFFICE/OUTPT VISIT, EST, LEVL IV, 30-39 MIN: ICD-10-PCS | Mod: S$GLB,,, | Performed by: FAMILY MEDICINE

## 2020-07-18 PROCEDURE — 1159F MED LIST DOCD IN RCRD: CPT | Mod: S$GLB,,, | Performed by: FAMILY MEDICINE

## 2020-07-18 PROCEDURE — 3008F PR BODY MASS INDEX (BMI) DOCUMENTED: ICD-10-PCS | Mod: CPTII,S$GLB,, | Performed by: FAMILY MEDICINE

## 2020-07-18 PROCEDURE — 1101F PR PT FALLS ASSESS DOC 0-1 FALLS W/OUT INJ PAST YR: ICD-10-PCS | Mod: CPTII,S$GLB,, | Performed by: FAMILY MEDICINE

## 2020-07-18 PROCEDURE — 3079F DIAST BP 80-89 MM HG: CPT | Mod: CPTII,S$GLB,, | Performed by: FAMILY MEDICINE

## 2020-07-18 PROCEDURE — 1126F AMNT PAIN NOTED NONE PRSNT: CPT | Mod: S$GLB,,, | Performed by: FAMILY MEDICINE

## 2020-07-18 PROCEDURE — 73030 X-RAY EXAM OF SHOULDER: CPT | Mod: 26,LT,, | Performed by: RADIOLOGY

## 2020-07-18 PROCEDURE — 99999 PR PBB SHADOW E&M-EST. PATIENT-LVL V: CPT | Mod: PBBFAC,,, | Performed by: FAMILY MEDICINE

## 2020-07-18 PROCEDURE — 3079F PR MOST RECENT DIASTOLIC BLOOD PRESSURE 80-89 MM HG: ICD-10-PCS | Mod: CPTII,S$GLB,, | Performed by: FAMILY MEDICINE

## 2020-07-18 PROCEDURE — 73030 XR SHOULDER COMPLETE 2 OR MORE VIEWS LEFT: ICD-10-PCS | Mod: 26,LT,, | Performed by: RADIOLOGY

## 2020-07-18 PROCEDURE — 3075F PR MOST RECENT SYSTOLIC BLOOD PRESS GE 130-139MM HG: ICD-10-PCS | Mod: CPTII,S$GLB,, | Performed by: FAMILY MEDICINE

## 2020-07-18 PROCEDURE — 99214 OFFICE O/P EST MOD 30 MIN: CPT | Mod: S$GLB,,, | Performed by: FAMILY MEDICINE

## 2020-07-18 PROCEDURE — 3008F BODY MASS INDEX DOCD: CPT | Mod: CPTII,S$GLB,, | Performed by: FAMILY MEDICINE

## 2020-07-18 PROCEDURE — 1126F PR PAIN SEVERITY QUANTIFIED, NO PAIN PRESENT: ICD-10-PCS | Mod: S$GLB,,, | Performed by: FAMILY MEDICINE

## 2020-07-18 PROCEDURE — 73030 X-RAY EXAM OF SHOULDER: CPT | Mod: TC,LT

## 2020-07-18 PROCEDURE — 1101F PT FALLS ASSESS-DOCD LE1/YR: CPT | Mod: CPTII,S$GLB,, | Performed by: FAMILY MEDICINE

## 2020-07-18 PROCEDURE — 1159F PR MEDICATION LIST DOCUMENTED IN MEDICAL RECORD: ICD-10-PCS | Mod: S$GLB,,, | Performed by: FAMILY MEDICINE

## 2020-07-18 RX ORDER — LOSARTAN POTASSIUM 100 MG/1
100 TABLET ORAL DAILY
Qty: 90 TABLET | Refills: 3 | Status: SHIPPED | OUTPATIENT
Start: 2020-07-18 | End: 2020-08-06 | Stop reason: SDUPTHER

## 2020-07-18 RX ORDER — HYDROCHLOROTHIAZIDE 25 MG/1
25 TABLET ORAL DAILY
Qty: 90 TABLET | Refills: 3 | Status: SHIPPED | OUTPATIENT
Start: 2020-07-18 | End: 2020-08-06 | Stop reason: SDUPTHER

## 2020-07-18 NOTE — ADDENDUM NOTE
Addended by: AYESHA MARTELL on: 7/18/2020 11:04 AM     Modules accepted: Orders, Level of Service

## 2020-07-18 NOTE — PROGRESS NOTES
Subjective:       Patient ID: Susu Luther is a 70 y.o. female.    Chief Complaint: Follow-up    Pt is a 70 year old who had some dizziness and visual issues 1 week ago with BP going up and down. Pt also has had some falls recently. Pt is complaining of left shoulder pain from a fall. Pt indicated that she can not move it on one to even 90 degree    Review of Systems   Constitutional: Negative.    Respiratory: Negative.    Cardiovascular: Negative.    Genitourinary: Negative.    Hematological: Negative.    Psychiatric/Behavioral: Negative.          Objective:      Physical Exam  Constitutional:       Appearance: Normal appearance.   Neck:      Musculoskeletal: Normal range of motion and neck supple.   Cardiovascular:      Rate and Rhythm: Normal rate and regular rhythm.      Pulses: Normal pulses.      Heart sounds: Normal heart sounds.   Pulmonary:      Effort: Pulmonary effort is normal.      Breath sounds: Normal breath sounds.   Neurological:      Mental Status: She is alert.         Assessment:       1. Essential hypertension    2. Blurry vision    3. Chronic left shoulder pain        Plan:       Essential hypertension  Comments:  Pressure reported as more stable.   Orders:  -     Comprehensive metabolic panel; Future; Expected date: 07/18/2020  -     CBC auto differential; Future; Expected date: 07/18/2020    Blurry vision  Comments:  Pt improved    Chronic left shoulder pain  -     X-Ray Shoulder 2 or More Views Left; Future; Expected date: 07/18/2020

## 2020-07-18 NOTE — Clinical Note
Can we see if external neurology can [facility] pt [facility]

## 2020-07-20 NOTE — TELEPHONE ENCOUNTER
SW contacted pt to check-in from previous encounter. Pt explained that she had been dealing with a lot but would be ok. Pt explained that her  health had declined overall. SW offered support. Pt inquired about home health services. Pt explained that she would contact [facility] to determine their treatment plan.    F/u by next week

## 2020-07-21 ENCOUNTER — TELEPHONE (OUTPATIENT)
Dept: PSYCHIATRY | Facility: CLINIC | Age: 70
End: 2020-07-21

## 2020-07-21 ENCOUNTER — DOCUMENT SCAN (OUTPATIENT)
Dept: HOME HEALTH SERVICES | Facility: HOSPITAL | Age: 70
End: 2020-07-21
Payer: MEDICARE

## 2020-07-21 NOTE — TELEPHONE ENCOUNTER
Called patient in regards to 5 consecutive no show appointment. No Answer left voicemail indicating that patient should call back to schedule when she is ready to fully engage in treatment.

## 2020-07-22 DIAGNOSIS — Z17.0 MALIGNANT NEOPLASM OF UPPER-OUTER QUADRANT OF RIGHT BREAST IN FEMALE, ESTROGEN RECEPTOR POSITIVE: Chronic | ICD-10-CM

## 2020-07-22 DIAGNOSIS — C50.411 MALIGNANT NEOPLASM OF UPPER-OUTER QUADRANT OF RIGHT BREAST IN FEMALE, ESTROGEN RECEPTOR POSITIVE: Chronic | ICD-10-CM

## 2020-07-22 RX ORDER — DIAZEPAM 10 MG/1
10 TABLET ORAL 2 TIMES DAILY PRN
Qty: 60 TABLET | Refills: 1 | Status: SHIPPED | OUTPATIENT
Start: 2020-07-22 | End: 2020-08-06 | Stop reason: SDUPTHER

## 2020-07-22 RX ORDER — BUTALBITAL, ACETAMINOPHEN AND CAFFEINE 50; 325; 40 MG/1; MG/1; MG/1
1 CAPSULE ORAL 3 TIMES DAILY PRN
Qty: 60 CAPSULE | Refills: 0 | Status: SHIPPED | OUTPATIENT
Start: 2020-07-22 | End: 2020-08-06 | Stop reason: SDUPTHER

## 2020-07-22 NOTE — TELEPHONE ENCOUNTER
----- Message from Kristy Peterson sent at 7/22/2020  9:36 AM CDT -----  Regarding: refill  Contact: patient  Type:  RX Refill Request    Who Called: patient  Refill or New Rx:refill  RX Name and Strength:Buttela (?)  How is the patient currently taking it? (ex. 1XDay):3 times daily  Is this a 30 day or 90 day RX:  Preferred Pharmacy with phone number:[facility] or Mail Order:local  Ordering Provider:Dr Huynh  Would the patient rather a call back or a response via [facility]? call  Best Call Back Number:[phone]   Additional Information:     Type:  RX Refill Request    Who Called: patient  Refill or New Rx:refill  RX Name and Strength:Diazepam, does not know strength  How is the patient currently taking it? (ex. 1XDay):as needed  Is this a 30 day or 90 day RX:  Preferred Pharmacy with phone number:[facility] or Mail Order:local  Ordering Provider:Dr Huynh  Would the patient rather a call back or a response via [facility]? call  Best Call Back Number:[phone]   Additional Information:     Patient also requesting something for pain be called in.

## 2020-07-24 ENCOUNTER — NURSE TRIAGE (OUTPATIENT)
Dept: ADMINISTRATIVE | Facility: CLINIC | Age: 70
End: 2020-07-24

## 2020-07-24 ENCOUNTER — DOCUMENT SCAN (OUTPATIENT)
Dept: HOME HEALTH SERVICES | Facility: HOSPITAL | Age: 70
End: 2020-07-24
Payer: MEDICARE

## 2020-07-24 NOTE — TELEPHONE ENCOUNTER
Reason for Disposition   Fainted    Additional Information   Negative: Systolic BP < 90 and feeling dizzy, lightheaded, or weak   Negative: Started suddenly after an allergic medicine, an allergic food, or bee sting   Negative: Difficult to awaken or acting confused  (e.g., disoriented, slurred speech)   Negative: Shock suspected (e.g., cold/pale/clammy skin, too weak to stand, low BP, rapid pulse)    Protocols used: LOW BLOOD PRESSURE-A-OH  PT asst states BP 99/74 HR= 87 RR=27 , dizzy, feels faint , HA. Took BP meds this am at due to take another dose. Rec EMS due to dizzy and feeling faint. Reiterate EMS due to sx. PT asst states she will call EMS. urgent office message sent to PCP at caller request. call back with questions.

## 2020-07-28 ENCOUNTER — TELEPHONE (OUTPATIENT)
Dept: INTERNAL MEDICINE | Facility: CLINIC | Age: 70
End: 2020-07-28

## 2020-07-28 NOTE — TELEPHONE ENCOUNTER
I returned call to pt to schedule appointment with cardiology. Pt scheduled an appointment for August 11th. Pt thanked me for the call and ended call. //BJ

## 2020-07-28 NOTE — TELEPHONE ENCOUNTER
I s/w Lydia with [facility] in regards to pt. She states that pt has been having some BP issues and she would like to get in with Cardiology. I informed her I would give pt a call to schedule appointment with Cardiology. //BJ

## 2020-07-28 NOTE — TELEPHONE ENCOUNTER
----- Message from Carlotta Faith sent at 7/28/2020  2:27 PM CDT -----  Type:  Patient Returning Call    Who Called:Susu  Who Left Message for Patient:  Does the patient know what this is regarding?:yes  Would the patient rather a call back or a response via [facility]? call  Best Call Back Number:[phone]    Additional Information:

## 2020-07-28 NOTE — TELEPHONE ENCOUNTER
----- Message from Mariah Kc sent at 7/28/2020 12:41 PM CDT -----  Contact: [facility]  Requesting call back regarding getting status of referral for pt to see cardiologist. Please call back at [phone].

## 2020-07-28 NOTE — TELEPHONE ENCOUNTER
----- Message from Kristy Peterson sent at 7/28/2020  1:36 PM CDT -----  Regarding: returned call  Contact: Lydia [facility]  Ms Freeman is returning a call,please call her back at [phone]

## 2020-08-03 ENCOUNTER — DOCUMENT SCAN (OUTPATIENT)
Dept: HOME HEALTH SERVICES | Facility: HOSPITAL | Age: 70
End: 2020-08-03
Payer: MEDICARE

## 2020-08-05 ENCOUNTER — TELEPHONE (OUTPATIENT)
Dept: HEMATOLOGY/ONCOLOGY | Facility: CLINIC | Age: 70
End: 2020-08-05

## 2020-08-05 NOTE — TELEPHONE ENCOUNTER
Called pt back to check on her and she was still at home. She stated she was trying to eat something. She said she did not want to go to ER, does not have Covid 19 and wants to wait for her appointment with you tomorrow.

## 2020-08-05 NOTE — PROGRESS NOTES
Subjective:       Patient ID: Susu Luther is a 70 y.o. female.    Chief Complaint: Malignant neoplasm of upper-outer quadrant of right breast in female, estrogen receptor positive [C50.411, Z17.0]  HPI: We have an opportunity to see Ms. Susu Luther in Hematology Oncology clinic at [facility] on 08/05/2020.  Ms. Susu Luther is a 70 y.o. woman with pT2N0 invasive lobular breast cancer ER positive IA negative Her2 negative s/p lumpectomy and sentinel node biopsy.       Was found right breast mass on self breast exam.  Screening mammogram on 2/26/2019 showed Impression:  Right  Mass: Right breast mass at the lower outer middle position. Assessment: 0 - Incomplete. Special Views: Spot Compression View and Ultrasound are recommended.      Left  There is no mammographic evidence of malignancy.     On March 4, 2019, underwent US guided biopsy showed FINAL PATHOLOGIC DIAGNOSIS  1. BREAST, RIGHT, LOWER OUTER 08:00, ULTRASOUND-GUIDED BIOPSY:  Invasive lobular carcinoma of breast.  Size of invasive carcinoma: 19 mm in greatest linear dimension within a single core biopsy fragment.  [place] Histologic Score: Grade 2 of 3.  Tubule formation: 3  Nuclear pleomorphism: 2  Mitoses: 1  Associated lobular carcinoma in situ (LCIS) is present.  No lymphovascular or perineural invasion.  Breast biomarkers are pending and will be included in the supplemental report.  2. AXILLA, RIGHT LYMPH NODES, ULTRASOUND-GUIDED BIOPSY:  Benign lymph node without evidence of metastatic carcinoma.  Immunohistochemical stains (Cam 5.2 and CK7) are confirmatory.     On March 27, 2019 underwent lumpectomy with sentinel node.  Pathology showed   PROCEDURE: Excision/lumpectomy  SPECIMEN LATERALITY: Right breast  TUMOR SITE: 8 o'clock  TUMOR SIZE: Approximately 4.0 x 3.0 x 3.0 cm  HISTOLOGIC TYPE: Invasive lobular carcinoma  HISTOLOGIC GRADE: Grade 2 of 3  Tubular differentiation: 3  Nuclear pleomorphism: 2  Mitotic rate:  1  TUMOR FOCALITY: Single focus of invasive carcinoma  DUCTAL CARCINOMA IN SITU: Not identified  LOBULAR CARCINOMA IN SITU: Present  MARGINS:  Main specimen (#6) : Carcinoma present at superior, deep and medial margins  Distance from inferior margin: 5 mm  Distance from lateral margin: greater than 20 mm  Distance of anterior margin: 15 mm  Additionally submitted margins (specimen #7): Distance from newly designated margin: 4 mm  REGIONAL LYMPH NODES: Uninvolved tumor cells  Number of total lymph nodes examined: 8  Number of sentinel nodes examined: 4  TREATMENT EFFECT: No known presurgical therapy  LYMPH-VASCULAR INVASION: Not identified  ANCILLARY STUDIES (performed on patient's previous biopsy MS ):  ER: Positive (95% of tumor cells)  CA: Negative (less than 1 % of tumor cells)  Her2 by FISH: Negative (not amplified)  Ki-67%: Positive (70 % of tumor cells)  ADDITIONAL FINDINGS: Apocrine metaplasia, usual ductal hyperplasia, fibroadenomatoid change  PATHOLOGIC STAGE CLASSIFICATION: pT2 pN0     Oncotype type DX recurrence score 22, with distant recurrence risk 8%, absolute chemotherapy benefit <1%.     Completed adjuvant radiation.  Has right breast tenderness.  Started on anastrozole.  Has arthralgias. Could not tolerate.  AI was changed to letrozole, tolerating well.    Reports ran out of her antihypertensive medications, has had high blood pressure at home and headaches.       Oncology History   Malignant neoplasm of upper-outer quadrant of right breast in female, estrogen receptor positive   3/14/2019 Initial Diagnosis    Malignant neoplasm of upper-outer quadrant of right breast in female, estrogen receptor positive     3/27/2019 Cancer Staged    Staging form: Breast, AJCC 8th Edition  - Pathologic stage from 3/27/2019: Stage IIA (pT2, pN0(sn), cM0, G2, ER+, CA-, HER2-) - Signed by Guido Huynh MD on 4/4/2019 5/20/2019 - 6/18/2019 Radiation Therapy    Treatment Site Ref. ID Energy Dose/Fx (Gy) #Fx  Dose Correction (Gy) Total Dose (Gy) Start Date End Date Elapsed Days   Boost Boost 18X 2.5 4 / 4 0 10 6/13/2019 6/18/2019 5   RtBreastAddMV Rt Breast 18X/6X 2.66 16 / 16 0 42.56 5/20/2019 6/12/2019 23            Past Medical History:   Diagnosis Date    Encounter for blood transfusion     Hx of psychiatric care     Hypertension     Major depressive disorder, single episode, moderate with anxious distress 1/14/2020    Malignant neoplasm of upper-outer quadrant of right breast in female, estrogen receptor positive 3/14/2019    radiation    Psychiatric problem     Sleep difficulties     Stroke 2009    no residual defect    Therapy      Family History   Problem Relation Age of Onset    Diabetes Maternal Grandmother     Diabetes Maternal Grandfather     Diabetes Mother     Breast cancer Neg Hx     Colon cancer Neg Hx     Ovarian cancer Neg Hx      Social History     Socioeconomic History    Marital status:      Spouse name: Campos Luther    Number of children: 2    Years of education: Not on file    Highest education level: Not on file   Occupational History    Not on file   Social Needs    Financial resource strain: Not on file    Food insecurity     Worry: Not on file     Inability: Not on file    Transportation needs     Medical: Not on file     Non-medical: Not on file   Tobacco Use    Smoking status: Never Smoker    Smokeless tobacco: Never Used   Substance and Sexual Activity    Alcohol use: No    Drug use: No    Sexual activity: Yes     Partners: Male     Birth control/protection: Post-menopausal   Lifestyle    Physical activity     Days per week: Not on file     Minutes per session: Not on file    Stress: Not on file   Relationships    Social connections     Talks on phone: Not on file     Gets together: Not on file     Attends Baptism service: Not on file     Active member of club or organization: Not on file     Attends meetings of clubs or organizations: Not on file      Relationship status: Not on file   Other Topics Concern    Patient feels they ought to cut down on drinking/drug use Not Asked    Patient annoyed by others criticizing their drinking/drug use Not Asked    Patient has felt bad or guilty about drinking/drug use Not Asked    Patient has had a drink/used drugs as an eye opener in the AM Not Asked   Social History Narrative    Not on file     Past Surgical History:   Procedure Laterality Date    APPENDECTOMY      BREAST BIOPSY      2019    BREAST LUMPECTOMY      2019     SECTION      x 1    OOPHORECTOMY      right     SENTINEL LYMPH NODE BIOPSY Right 3/27/2019    Procedure: BIOPSY, LYMPH NODE, SENTINEL;  Surgeon: Artemio Starks MD;  Location: [facility];  Service: General;  Laterality: Right;     Current Outpatient Medications   Medication Sig Dispense Refill    albuterol (VENTOLIN HFA) 90 mcg/actuation inhaler Inhale 2 puffs into the lungs every 4 (four) hours as needed for Shortness of Breath. 18 g 11    aspirin (ECOTRIN) 81 MG EC tablet Take 1 tablet (81 mg total) by mouth once daily. (Patient taking differently: Take 81 mg by mouth every other day. )  0    atorvastatin (LIPITOR) 40 MG tablet Take 1 tablet (40 mg total) by mouth once daily. 90 tablet 3    bismuth subsalicylate (PEPTO BISMOL) 262 mg/15 mL suspension Take 15 mLs by mouth every 6 (six) hours as needed for Indigestion.      blood pressure kit med and lrg Kit Take blood pressure first thing in the morning. 1 each 0    blood pressure kit med and lrg Kit Check blood pressure every morning 1 each 0    butalbital-acetaminophen-caff -40 mg -40 mg Cap Take 1 capsule by mouth 3 (three) times daily as needed. 60 capsule 0    cholecalciferol, vitamin D3, 50,000 unit capsule Take 50,000 Units by mouth once a week.   0    cloNIDine (CATAPRES) 0.1 MG tablet TAKE 1 TABLET BY MOUTH THREE TIMES DAILY (Patient not taking: Reported on 2020) 270 tablet 1    cloNIDine  (CATAPRES) 0.2 MG tablet Take 1 tablet (0.2 mg total) by mouth 3 (three) times daily. 90 tablet 1    diazePAM (VALIUM) 10 MG Tab Take 1 tablet (10 mg total) by mouth 2 (two) times daily as needed. 60 tablet 1    emollient combination no.63 (MIADERM) Lotn Apply 1 application topically 3 (three) times daily. (Patient not taking: Reported on 7/18/2020)      erythromycin (ROMYCIN) ophthalmic ointment APPLY TO EYELASHES AT BEDTIME      furosemide (LASIX) 20 MG tablet take 1/2 tablet by mouth a day. 90 tablet 0    gabapentin (NEURONTIN) 600 MG tablet Take 1 tablet (600 mg total) by mouth once daily AND 1 tablet (600 mg total) after lunch AND 2 tablets (1,200 mg total) every evening. 120 tablet 11    hydrALAZINE (APRESOLINE) 25 MG tablet Take 1 tablet (25 mg total) by mouth every 8 (eight) hours. 90 tablet 1    hydroCHLOROthiazide (HYDRODIURIL) 25 MG tablet Take 1 tablet (25 mg total) by mouth once daily. 90 tablet 3    hydroCHLOROthiazide (MICROZIDE) 12.5 mg capsule       inhalation spacing device (COMPACT SPACE CHAMBER) Use as directed for inhalation. 1 Device 0    letrozole (FEMARA) 2.5 mg Tab Take 1 tablet (2.5 mg total) by mouth once daily. 30 tablet 11    lidocaine (LIDODERM) 5 % Place 1 patch onto the skin daily as needed. Remove & Discard patch within 12 hours or as directed by MD 30 patch 0    lidocaine HCL-menthoL 4-1 % PtMd Apply 1 patch topically daily as needed. 30 patch 6    lidocaine-prilocaine (EMLA) cream       losartan (COZAAR) 100 MG tablet Take 1 tablet (100 mg total) by mouth once daily. 90 tablet 3    meclizine (ANTIVERT) 12.5 mg tablet Take 1 tablet (12.5 mg total) by mouth 3 (three) times daily as needed. 90 tablet 0    multivitamin (ONE DAILY MULTIVITAMIN) per tablet Take 1 tablet by mouth once daily.      naproxen (NAPROSYN) 500 MG tablet Take 1 tablet (500 mg total) by mouth 2 (two) times daily with meals. (Patient not taking: Reported on 7/18/2020) 60 tablet 2    NARCAN 4  mg/actuation [place] CALL 911. ADMINISTER A SINGLE SPRAY INTRANASALLY INTO ONE NOSTRIL UPON SIGNS OF OPIOID OVERDOSE. MAY REPEAT AFTER 3 MINUTES IF NO RESPONSE.  0    ofloxacin (OCUFLOX) 0.3 % ophthalmic solution INSTILL 1 DROP INTO EACH EYE 4 TIMES DAILY      OLANZapine (ZYPREXA) 10 MG tablet Take 10 mg by mouth nightly.      oxyCODONE-acetaminophen (PERCOCET) 7.5-325 mg per tablet Take 1 tablet by mouth every 8 (eight) hours as needed for Pain. 30 tablet 0    prednisoLONE acetate (PRED FORTE) 1 % DrpS INSTILL 1 DROP INTO EACH EYE THREE TIMES DAILY      propranolol (INDERAL) 40 MG tablet Take 1 tablet (40 mg total) by mouth 2 (two) times daily. 60 tablet 11    sertraline (ZOLOFT) 50 MG tablet Take 1 tablet (50 mg total) by mouth once daily. 90 tablet 0    traMADoL (ULTRAM) 50 mg tablet Take 1 tablet (50 mg total) by mouth every 6 (six) hours as needed. (Patient not taking: Reported on 7/18/2020) 50 tablet 0    venlafaxine (EFFEXOR-XR) 150 MG Cp24 Take 1 capsule (150 mg total) by mouth once daily. 90 capsule 3    venlafaxine (EFFEXOR-XR) 75 MG 24 hr capsule Take 75 mg by mouth once daily.       No current facility-administered medications for this visit.        Labs:  Lab Results   Component Value Date    WBC 7.45 07/18/2020    HGB 11.2 (L) 07/18/2020    HCT 38.8 07/18/2020    MCV 86 07/18/2020     07/18/2020     BMP  Lab Results   Component Value Date     07/18/2020    K 4.2 07/18/2020     07/18/2020    CO2 27 07/18/2020    BUN 13 07/18/2020    CREATININE 1.2 07/18/2020    CALCIUM 9.7 07/18/2020    ANIONGAP 7 (L) 07/18/2020    ESTGFRAFRICA 53.7 (A) 07/18/2020    EGFRNONAA 46.5 (A) 07/18/2020     Lab Results   Component Value Date    ALT 23 07/18/2020    AST 16 07/18/2020    ALKPHOS 146 (H) 07/18/2020    BILITOT 0.3 07/18/2020       Lab Results   Component Value Date    IRON 69 12/16/2019    TIBC 302 12/16/2019    FERRITIN 56 12/16/2019     Lab Results   Component Value Date    HIXNECLN40  994 (H) 12/16/2019     Lab Results   Component Value Date    FOLATE 3.9 (L) 12/16/2019     Lab Results   Component Value Date    TSH 1.170 12/16/2019       I have reviewed the radiology reports and examined the scan/xray images.    Review of Systems   Constitutional: Negative.    HENT: Negative.    Eyes: Negative.    Respiratory: Negative.    Cardiovascular: Negative.    Gastrointestinal: Negative.    Endocrine: Negative.    Genitourinary: Negative.    Musculoskeletal: Negative.    Skin: Negative.    Allergic/Immunologic: Negative.    Neurological: Positive for headaches.   Hematological: Negative.    Psychiatric/Behavioral: Negative.      ECOG SCORE    0 - Fully active-able to carry on all pre-disease performance without restriction            Objective:     Vitals:    08/06/20 1411   BP: (!) 158/98   Pulse: (!) 112   Temp: 98.2 °F (36.8 °C)   Body mass index is 43.92 kg/m².  Physical Exam  Vitals signs and nursing note reviewed.   Constitutional:       Appearance: She is well-developed.   HENT:      Head: Normocephalic and atraumatic.   Eyes:      Conjunctiva/sclera: Conjunctivae normal.   Neck:      Musculoskeletal: Normal range of motion and neck supple.   Cardiovascular:      Rate and Rhythm: Normal rate and regular rhythm.   Pulmonary:      Effort: Pulmonary effort is normal.      Breath sounds: Normal breath sounds.   Abdominal:      General: Bowel sounds are normal.      Palpations: Abdomen is soft.   Musculoskeletal: Normal range of motion.   Skin:     General: Skin is warm and dry.   Neurological:      Mental Status: She is alert and oriented to person, place, and time.   Psychiatric:         Behavior: Behavior normal.         Thought Content: Thought content normal.         Judgment: Judgment normal.           Assessment:      1. Malignant neoplasm of upper-outer quadrant of right breast in female, estrogen receptor positive    2. Uncontrolled hypertension    3. Essential hypertension    4. Chronic pain  of breast    5. Malignant neoplasm of upper-outer quadrant of right breast in female, estrogen receptor positive    6. Bilateral headaches           Plan:     Malignant neoplasm of upper-outer quadrant of right breast in female, estrogen receptor positive  Continue on letrozole.  alkphos elevated, ? From liver or bone.  Will need restage with PET CT  -     diazePAM (VALIUM) 10 MG Tab; Take 1 tablet (10 mg total) by mouth 2 (two) times daily as needed.  Dispense: 60 tablet; Refill: 1  -     NM PET CT Routine Skull to Mid Thigh; Future; Expected date: 08/06/2020  -     SUBSEQUENT HOME HEALTH ORDERS  -     CBC auto differential; Future; Expected date: 10/08/2020  -     Comprehensive metabolic panel; Future; Expected date: 10/08/2020  -     venlafaxine (EFFEXOR-XR) 75 MG 24 hr capsule; Take 1 capsule (75 mg total) by mouth once daily.  Dispense: 90 capsule; Refill: 1        Essential hypertension  -     cloNIDine (CATAPRES) 0.2 MG tablet; Take 1 tablet (0.2 mg total) by mouth 3 (three) times daily.  Dispense: 90 tablet; Refill: 1  -     hydroCHLOROthiazide (HYDRODIURIL) 25 MG tablet; Take 1 tablet (25 mg total) by mouth once daily.  Dispense: 90 tablet; Refill: 3  -     hydrALAZINE (APRESOLINE) 25 MG tablet; Take 1 tablet (25 mg total) by mouth every 8 (eight) hours.  Dispense: 90 tablet; Refill: 1  -     losartan (COZAAR) 100 MG tablet; Take 1 tablet (100 mg total) by mouth once daily.  Dispense: 90 tablet; Refill: 3  -     SUBSEQUENT HOME HEALTH ORDERS    Chronic pain of breast  -     oxyCODONE-acetaminophen (PERCOCET) 7.5-325 mg per tablet; Take 1 tablet by mouth every 8 (eight) hours as needed for Pain.  Dispense: 30 tablet; Refill: 0    Malignant neoplasm of upper-outer quadrant of right breast in female, estrogen receptor positive  Comments:  Pt continue to see Hem/Onc    -     diazePAM (VALIUM) 10 MG Tab; Take 1 tablet (10 mg total) by mouth 2 (two) times daily as needed.  Dispense: 60 tablet; Refill: 1  -     NM  PET CT Routine Skull to Mid Thigh; Future; Expected date: 08/06/2020  -     SUBSEQUENT HOME HEALTH ORDERS  -     CBC auto differential; Future; Expected date: 10/08/2020  -     Comprehensive metabolic panel; Future; Expected date: 10/08/2020  -     venlafaxine (EFFEXOR-XR) 75 MG 24 hr capsule; Take 1 capsule (75 mg total) by mouth once daily.  Dispense: 90 capsule; Refill: 1    Bilateral headaches    -     butalbital-acetaminophen-caff -40 mg -40 mg Cap; Take 1 capsule by mouth 3 (three) times daily as needed.  Dispense: 60 capsule; Refill: 0  -     MRI BRAIN W WO CONTRAST; Future; Expected date: 08/06/2020  -     SUBSEQUENT HOME HEALTH ORDERS

## 2020-08-06 ENCOUNTER — OFFICE VISIT (OUTPATIENT)
Dept: HEMATOLOGY/ONCOLOGY | Facility: CLINIC | Age: 70
End: 2020-08-06
Payer: COMMERCIAL

## 2020-08-06 ENCOUNTER — LAB VISIT (OUTPATIENT)
Dept: LAB | Facility: HOSPITAL | Age: 70
End: 2020-08-06
Attending: INTERNAL MEDICINE
Payer: COMMERCIAL

## 2020-08-06 VITALS
OXYGEN SATURATION: 99 % | WEIGHT: 293 LBS | HEART RATE: 112 BPM | DIASTOLIC BLOOD PRESSURE: 98 MMHG | BODY MASS INDEX: 43.4 KG/M2 | TEMPERATURE: 98 F | SYSTOLIC BLOOD PRESSURE: 158 MMHG | HEIGHT: 69 IN

## 2020-08-06 DIAGNOSIS — N64.4 CHRONIC PAIN OF BREAST: ICD-10-CM

## 2020-08-06 DIAGNOSIS — Z17.0 MALIGNANT NEOPLASM OF UPPER-OUTER QUADRANT OF RIGHT BREAST IN FEMALE, ESTROGEN RECEPTOR POSITIVE: Chronic | ICD-10-CM

## 2020-08-06 DIAGNOSIS — Z17.0 MALIGNANT NEOPLASM OF UPPER-OUTER QUADRANT OF RIGHT BREAST IN FEMALE, ESTROGEN RECEPTOR POSITIVE: Primary | Chronic | ICD-10-CM

## 2020-08-06 DIAGNOSIS — G89.29 CHRONIC PAIN OF BREAST: ICD-10-CM

## 2020-08-06 DIAGNOSIS — C50.411 MALIGNANT NEOPLASM OF UPPER-OUTER QUADRANT OF RIGHT BREAST IN FEMALE, ESTROGEN RECEPTOR POSITIVE: Primary | Chronic | ICD-10-CM

## 2020-08-06 DIAGNOSIS — I10 ESSENTIAL HYPERTENSION: ICD-10-CM

## 2020-08-06 DIAGNOSIS — C50.411 MALIGNANT NEOPLASM OF UPPER-OUTER QUADRANT OF RIGHT BREAST IN FEMALE, ESTROGEN RECEPTOR POSITIVE: Chronic | ICD-10-CM

## 2020-08-06 DIAGNOSIS — R51.9 BILATERAL HEADACHES: ICD-10-CM

## 2020-08-06 DIAGNOSIS — I10 UNCONTROLLED HYPERTENSION: ICD-10-CM

## 2020-08-06 LAB
ALBUMIN SERPL BCP-MCNC: 3.3 G/DL (ref 3.5–5.2)
ALP SERPL-CCNC: 163 U/L (ref 55–135)
ALT SERPL W/O P-5'-P-CCNC: 26 U/L (ref 10–44)
ANION GAP SERPL CALC-SCNC: 11 MMOL/L (ref 8–16)
AST SERPL-CCNC: 26 U/L (ref 10–40)
BASOPHILS # BLD AUTO: 0.07 K/UL (ref 0–0.2)
BASOPHILS NFR BLD: 0.7 % (ref 0–1.9)
BILIRUB SERPL-MCNC: 0.2 MG/DL (ref 0.1–1)
BUN SERPL-MCNC: 18 MG/DL (ref 8–23)
CALCIUM SERPL-MCNC: 10 MG/DL (ref 8.7–10.5)
CHLORIDE SERPL-SCNC: 105 MMOL/L (ref 95–110)
CO2 SERPL-SCNC: 26 MMOL/L (ref 23–29)
CREAT SERPL-MCNC: 1.4 MG/DL (ref 0.5–1.4)
DIFFERENTIAL METHOD: ABNORMAL
EOSINOPHIL # BLD AUTO: 0.1 K/UL (ref 0–0.5)
EOSINOPHIL NFR BLD: 1.3 % (ref 0–8)
ERYTHROCYTE [DISTWIDTH] IN BLOOD BY AUTOMATED COUNT: 15.9 % (ref 11.5–14.5)
EST. GFR  (AFRICAN AMERICAN): 45 ML/MIN/1.73 M^2
EST. GFR  (NON AFRICAN AMERICAN): 39 ML/MIN/1.73 M^2
GLUCOSE SERPL-MCNC: 140 MG/DL (ref 70–110)
HCT VFR BLD AUTO: 40.7 % (ref 37–48.5)
HGB BLD-MCNC: 12.5 G/DL (ref 12–16)
IMM GRANULOCYTES # BLD AUTO: 0.04 K/UL (ref 0–0.04)
IMM GRANULOCYTES NFR BLD AUTO: 0.4 % (ref 0–0.5)
LYMPHOCYTES # BLD AUTO: 2.6 K/UL (ref 1–4.8)
LYMPHOCYTES NFR BLD: 27.5 % (ref 18–48)
MCH RBC QN AUTO: 25.7 PG (ref 27–31)
MCHC RBC AUTO-ENTMCNC: 30.7 G/DL (ref 32–36)
MCV RBC AUTO: 84 FL (ref 82–98)
MONOCYTES # BLD AUTO: 0.7 K/UL (ref 0.3–1)
MONOCYTES NFR BLD: 7 % (ref 4–15)
NEUTROPHILS # BLD AUTO: 6 K/UL (ref 1.8–7.7)
NEUTROPHILS NFR BLD: 63.1 % (ref 38–73)
NRBC BLD-RTO: 0 /100 WBC
PLATELET # BLD AUTO: 346 K/UL (ref 150–350)
PMV BLD AUTO: 9.4 FL (ref 9.2–12.9)
POTASSIUM SERPL-SCNC: 4.1 MMOL/L (ref 3.5–5.1)
PROT SERPL-MCNC: 8.1 G/DL (ref 6–8.4)
RBC # BLD AUTO: 4.86 M/UL (ref 4–5.4)
SODIUM SERPL-SCNC: 142 MMOL/L (ref 136–145)
WBC # BLD AUTO: 9.57 K/UL (ref 3.9–12.7)

## 2020-08-06 PROCEDURE — 1101F PT FALLS ASSESS-DOCD LE1/YR: CPT | Mod: CPTII,S$GLB,, | Performed by: INTERNAL MEDICINE

## 2020-08-06 PROCEDURE — 99215 PR OFFICE/OUTPT VISIT, EST, LEVL V, 40-54 MIN: ICD-10-PCS | Mod: S$GLB,,, | Performed by: INTERNAL MEDICINE

## 2020-08-06 PROCEDURE — 3008F BODY MASS INDEX DOCD: CPT | Mod: CPTII,S$GLB,, | Performed by: INTERNAL MEDICINE

## 2020-08-06 PROCEDURE — 85025 COMPLETE CBC W/AUTO DIFF WBC: CPT

## 2020-08-06 PROCEDURE — 3080F DIAST BP >= 90 MM HG: CPT | Mod: CPTII,S$GLB,, | Performed by: INTERNAL MEDICINE

## 2020-08-06 PROCEDURE — 3008F PR BODY MASS INDEX (BMI) DOCUMENTED: ICD-10-PCS | Mod: CPTII,S$GLB,, | Performed by: INTERNAL MEDICINE

## 2020-08-06 PROCEDURE — 99999 PR PBB SHADOW E&M-EST. PATIENT-LVL IV: ICD-10-PCS | Mod: PBBFAC,,, | Performed by: INTERNAL MEDICINE

## 2020-08-06 PROCEDURE — 1159F MED LIST DOCD IN RCRD: CPT | Mod: S$GLB,,, | Performed by: INTERNAL MEDICINE

## 2020-08-06 PROCEDURE — 99999 PR PBB SHADOW E&M-EST. PATIENT-LVL IV: CPT | Mod: PBBFAC,,, | Performed by: INTERNAL MEDICINE

## 2020-08-06 PROCEDURE — 80053 COMPREHEN METABOLIC PANEL: CPT

## 2020-08-06 PROCEDURE — 1101F PR PT FALLS ASSESS DOC 0-1 FALLS W/OUT INJ PAST YR: ICD-10-PCS | Mod: CPTII,S$GLB,, | Performed by: INTERNAL MEDICINE

## 2020-08-06 PROCEDURE — 1126F AMNT PAIN NOTED NONE PRSNT: CPT | Mod: S$GLB,,, | Performed by: INTERNAL MEDICINE

## 2020-08-06 PROCEDURE — 1159F PR MEDICATION LIST DOCUMENTED IN MEDICAL RECORD: ICD-10-PCS | Mod: S$GLB,,, | Performed by: INTERNAL MEDICINE

## 2020-08-06 PROCEDURE — 99215 OFFICE O/P EST HI 40 MIN: CPT | Mod: S$GLB,,, | Performed by: INTERNAL MEDICINE

## 2020-08-06 PROCEDURE — 3077F SYST BP >= 140 MM HG: CPT | Mod: CPTII,S$GLB,, | Performed by: INTERNAL MEDICINE

## 2020-08-06 PROCEDURE — 1126F PR PAIN SEVERITY QUANTIFIED, NO PAIN PRESENT: ICD-10-PCS | Mod: S$GLB,,, | Performed by: INTERNAL MEDICINE

## 2020-08-06 PROCEDURE — 36415 COLL VENOUS BLD VENIPUNCTURE: CPT

## 2020-08-06 PROCEDURE — 3077F PR MOST RECENT SYSTOLIC BLOOD PRESSURE >= 140 MM HG: ICD-10-PCS | Mod: CPTII,S$GLB,, | Performed by: INTERNAL MEDICINE

## 2020-08-06 PROCEDURE — 3080F PR MOST RECENT DIASTOLIC BLOOD PRESSURE >= 90 MM HG: ICD-10-PCS | Mod: CPTII,S$GLB,, | Performed by: INTERNAL MEDICINE

## 2020-08-06 RX ORDER — OXYCODONE AND ACETAMINOPHEN 7.5; 325 MG/1; MG/1
1 TABLET ORAL EVERY 8 HOURS PRN
Qty: 30 TABLET | Refills: 0 | Status: SHIPPED | OUTPATIENT
Start: 2020-08-06 | End: 2020-11-02

## 2020-08-06 RX ORDER — VENLAFAXINE HYDROCHLORIDE 75 MG/1
75 CAPSULE, EXTENDED RELEASE ORAL DAILY
Qty: 90 CAPSULE | Refills: 1 | Status: SHIPPED | OUTPATIENT
Start: 2020-08-06 | End: 2020-11-05 | Stop reason: SDUPTHER

## 2020-08-06 RX ORDER — CLONIDINE HYDROCHLORIDE 0.2 MG/1
0.2 TABLET ORAL 3 TIMES DAILY
Qty: 90 TABLET | Refills: 1 | Status: SHIPPED | OUTPATIENT
Start: 2020-08-06 | End: 2020-09-24 | Stop reason: SDUPTHER

## 2020-08-06 RX ORDER — HYDRALAZINE HYDROCHLORIDE 25 MG/1
25 TABLET, FILM COATED ORAL EVERY 8 HOURS
Qty: 90 TABLET | Refills: 1 | Status: SHIPPED | OUTPATIENT
Start: 2020-08-06 | End: 2020-12-15 | Stop reason: SDUPTHER

## 2020-08-06 RX ORDER — LOSARTAN POTASSIUM 100 MG/1
100 TABLET ORAL DAILY
Qty: 90 TABLET | Refills: 3 | Status: SHIPPED | OUTPATIENT
Start: 2020-08-06 | End: 2020-09-30

## 2020-08-06 RX ORDER — HYDROCHLOROTHIAZIDE 25 MG/1
25 TABLET ORAL DAILY
Qty: 90 TABLET | Refills: 3 | Status: SHIPPED | OUTPATIENT
Start: 2020-08-06 | End: 2021-02-05 | Stop reason: SDUPTHER

## 2020-08-06 RX ORDER — BUTALBITAL, ACETAMINOPHEN AND CAFFEINE 50; 325; 40 MG/1; MG/1; MG/1
1 CAPSULE ORAL 3 TIMES DAILY PRN
Qty: 60 CAPSULE | Refills: 0 | Status: SHIPPED | OUTPATIENT
Start: 2020-08-06 | End: 2020-08-24

## 2020-08-06 RX ORDER — DIAZEPAM 10 MG/1
10 TABLET ORAL 2 TIMES DAILY PRN
Qty: 60 TABLET | Refills: 1 | Status: SHIPPED | OUTPATIENT
Start: 2020-08-06 | End: 2020-09-24 | Stop reason: SDUPTHER

## 2020-08-07 RX ORDER — LIDOCAINE 50 MG/G
1 PATCH TOPICAL DAILY PRN
Qty: 30 PATCH | Refills: 0 | Status: SHIPPED | OUTPATIENT
Start: 2020-08-07 | End: 2020-12-28 | Stop reason: SDUPTHER

## 2020-08-10 ENCOUNTER — PATIENT OUTREACH (OUTPATIENT)
Dept: ADMINISTRATIVE | Facility: OTHER | Age: 70
End: 2020-08-10

## 2020-08-11 DIAGNOSIS — I10 ESSENTIAL HYPERTENSION: Primary | ICD-10-CM

## 2020-08-17 ENCOUNTER — TELEPHONE (OUTPATIENT)
Dept: PHARMACY | Facility: CLINIC | Age: 70
End: 2020-08-17

## 2020-08-19 ENCOUNTER — TELEPHONE (OUTPATIENT)
Dept: HEMATOLOGY/ONCOLOGY | Facility: CLINIC | Age: 70
End: 2020-08-19

## 2020-08-19 NOTE — TELEPHONE ENCOUNTER
----- Message from Guido Huynh MD sent at 8/19/2020  3:37 PM CDT -----  Regarding: RE: Radiology  Let us not do scan  Will see her on next visit and discuss first  ----- Message -----  From: Jakub Christian MA  Sent: 8/19/2020   3:05 PM CDT  To: Guido Huynh MD  Subject: FW: Radiology                                    Pts PET got denied. Needs other options for pt .  ----- Message -----  From: RT Aneesh  Sent: 8/19/2020  10:19 AM CDT  To: [facility]a [facility] Staff  Subject: Radiology                                        Patient is scheduled for a PET test and insurance DENIED test, an in basket was sent to providers office for an appeal. Please determine what provider plans on doing and please call patient to get appt cancel and discuss other options if necessary. Any questions please call radiology at ext [phone]

## 2020-08-19 NOTE — TELEPHONE ENCOUNTER
Pts PET Scan has been denied. Per Dr. Huynh wants the pt to hold off until the next visit to discuss. Pt verbalized understanding.

## 2020-08-25 ENCOUNTER — HOSPITAL ENCOUNTER (OUTPATIENT)
Dept: CARDIOLOGY | Facility: HOSPITAL | Age: 70
Discharge: HOME | End: 2020-08-25
Attending: INTERNAL MEDICINE
Payer: COMMERCIAL

## 2020-08-25 ENCOUNTER — LAB VISIT (OUTPATIENT)
Dept: LAB | Facility: HOSPITAL | Age: 70
End: 2020-08-25
Attending: INTERNAL MEDICINE
Payer: COMMERCIAL

## 2020-08-25 ENCOUNTER — OFFICE VISIT (OUTPATIENT)
Dept: CARDIOLOGY | Facility: CLINIC | Age: 70
End: 2020-08-25
Attending: INTERNAL MEDICINE
Payer: COMMERCIAL

## 2020-08-25 ENCOUNTER — OFFICE VISIT (OUTPATIENT)
Dept: INTERNAL MEDICINE | Facility: CLINIC | Age: 70
End: 2020-08-25
Payer: COMMERCIAL

## 2020-08-25 VITALS
OXYGEN SATURATION: 99 % | HEART RATE: 80 BPM | TEMPERATURE: 97 F | BODY MASS INDEX: 43.36 KG/M2 | SYSTOLIC BLOOD PRESSURE: 118 MMHG | WEIGHT: 292.75 LBS | DIASTOLIC BLOOD PRESSURE: 76 MMHG | HEIGHT: 69 IN

## 2020-08-25 VITALS
HEART RATE: 72 BPM | WEIGHT: 292.75 LBS | BODY MASS INDEX: 43.23 KG/M2 | DIASTOLIC BLOOD PRESSURE: 90 MMHG | OXYGEN SATURATION: 99 % | SYSTOLIC BLOOD PRESSURE: 130 MMHG

## 2020-08-25 DIAGNOSIS — I63.00 CEREBROVASCULAR ACCIDENT (CVA) DUE TO THROMBOSIS OF PRECEREBRAL ARTERY: ICD-10-CM

## 2020-08-25 DIAGNOSIS — Z12.11 ENCOUNTER FOR COLORECTAL CANCER SCREENING: ICD-10-CM

## 2020-08-25 DIAGNOSIS — I10 ESSENTIAL HYPERTENSION: ICD-10-CM

## 2020-08-25 DIAGNOSIS — G47.33 OSA (OBSTRUCTIVE SLEEP APNEA): Chronic | ICD-10-CM

## 2020-08-25 DIAGNOSIS — I10 ESSENTIAL HYPERTENSION: Primary | ICD-10-CM

## 2020-08-25 DIAGNOSIS — Z12.12 ENCOUNTER FOR COLORECTAL CANCER SCREENING: ICD-10-CM

## 2020-08-25 DIAGNOSIS — R06.02 SOB (SHORTNESS OF BREATH): ICD-10-CM

## 2020-08-25 DIAGNOSIS — I50.32 CHRONIC DIASTOLIC CONGESTIVE HEART FAILURE: ICD-10-CM

## 2020-08-25 DIAGNOSIS — Z23 IMMUNIZATION DUE: ICD-10-CM

## 2020-08-25 LAB
BNP SERPL-MCNC: 63 PG/ML (ref 0–99)
CHOLEST SERPL-MCNC: 231 MG/DL (ref 120–199)
CHOLEST/HDLC SERPL: 4.7 {RATIO} (ref 2–5)
HDLC SERPL-MCNC: 49 MG/DL (ref 40–75)
HDLC SERPL: 21.2 % (ref 20–50)
LDLC SERPL CALC-MCNC: 135.6 MG/DL (ref 63–159)
NONHDLC SERPL-MCNC: 182 MG/DL
TRIGL SERPL-MCNC: 232 MG/DL (ref 30–150)

## 2020-08-25 PROCEDURE — 1159F MED LIST DOCD IN RCRD: CPT | Mod: S$GLB,,, | Performed by: INTERNAL MEDICINE

## 2020-08-25 PROCEDURE — 80061 LIPID PANEL: CPT

## 2020-08-25 PROCEDURE — 3075F SYST BP GE 130 - 139MM HG: CPT | Mod: CPTII,S$GLB,, | Performed by: INTERNAL MEDICINE

## 2020-08-25 PROCEDURE — 99215 OFFICE O/P EST HI 40 MIN: CPT | Mod: PBBFAC,25 | Performed by: FAMILY MEDICINE

## 2020-08-25 PROCEDURE — 3078F DIAST BP <80 MM HG: CPT | Mod: CPTII,S$GLB,, | Performed by: INTERNAL MEDICINE

## 2020-08-25 PROCEDURE — 99999 PR PBB SHADOW E&M-EST. PATIENT-LVL V: ICD-10-PCS | Mod: PBBFAC,,, | Performed by: INTERNAL MEDICINE

## 2020-08-25 PROCEDURE — 1101F PT FALLS ASSESS-DOCD LE1/YR: CPT | Mod: CPTII,S$GLB,, | Performed by: INTERNAL MEDICINE

## 2020-08-25 PROCEDURE — 93010 ELECTROCARDIOGRAM REPORT: CPT | Mod: ,,, | Performed by: INTERNAL MEDICINE

## 2020-08-25 PROCEDURE — 99999 PR PBB SHADOW E&M-EST. PATIENT-LVL V: ICD-10-PCS | Mod: PBBFAC,,, | Performed by: FAMILY MEDICINE

## 2020-08-25 PROCEDURE — 3008F PR BODY MASS INDEX (BMI) DOCUMENTED: ICD-10-PCS | Mod: CPTII,S$GLB,, | Performed by: INTERNAL MEDICINE

## 2020-08-25 PROCEDURE — 3075F PR MOST RECENT SYSTOLIC BLOOD PRESS GE 130-139MM HG: ICD-10-PCS | Mod: CPTII,S$GLB,, | Performed by: INTERNAL MEDICINE

## 2020-08-25 PROCEDURE — 93010 EKG 12-LEAD: ICD-10-PCS | Mod: ,,, | Performed by: INTERNAL MEDICINE

## 2020-08-25 PROCEDURE — 36415 COLL VENOUS BLD VENIPUNCTURE: CPT

## 2020-08-25 PROCEDURE — 3078F PR MOST RECENT DIASTOLIC BLOOD PRESSURE < 80 MM HG: ICD-10-PCS | Mod: CPTII,S$GLB,, | Performed by: INTERNAL MEDICINE

## 2020-08-25 PROCEDURE — 1159F PR MEDICATION LIST DOCUMENTED IN MEDICAL RECORD: ICD-10-PCS | Mod: S$GLB,,, | Performed by: INTERNAL MEDICINE

## 2020-08-25 PROCEDURE — 99204 PR OFFICE/OUTPT VISIT, NEW, LEVL IV, 45-59 MIN: ICD-10-PCS | Mod: S$GLB,,, | Performed by: INTERNAL MEDICINE

## 2020-08-25 PROCEDURE — 99204 OFFICE O/P NEW MOD 45 MIN: CPT | Mod: S$GLB,,, | Performed by: INTERNAL MEDICINE

## 2020-08-25 PROCEDURE — 93005 ELECTROCARDIOGRAM TRACING: CPT

## 2020-08-25 PROCEDURE — 1101F PR PT FALLS ASSESS DOC 0-1 FALLS W/OUT INJ PAST YR: ICD-10-PCS | Mod: CPTII,S$GLB,, | Performed by: INTERNAL MEDICINE

## 2020-08-25 PROCEDURE — 83880 ASSAY OF NATRIURETIC PEPTIDE: CPT

## 2020-08-25 PROCEDURE — 99999 PR PBB SHADOW E&M-EST. PATIENT-LVL V: CPT | Mod: PBBFAC,,, | Performed by: FAMILY MEDICINE

## 2020-08-25 PROCEDURE — 99999 PR PBB SHADOW E&M-EST. PATIENT-LVL V: CPT | Mod: PBBFAC,,, | Performed by: INTERNAL MEDICINE

## 2020-08-25 PROCEDURE — 3008F BODY MASS INDEX DOCD: CPT | Mod: CPTII,S$GLB,, | Performed by: INTERNAL MEDICINE

## 2020-08-25 RX ORDER — SERTRALINE HYDROCHLORIDE 50 MG/1
TABLET, FILM COATED ORAL
COMMUNITY
Start: 2020-08-24 | End: 2020-11-05 | Stop reason: SDUPTHER

## 2020-08-25 RX ORDER — GABAPENTIN 600 MG/1
TABLET ORAL
COMMUNITY
Start: 2020-08-24 | End: 2020-11-05 | Stop reason: SDUPTHER

## 2020-08-25 RX ORDER — AMLODIPINE BESYLATE 5 MG/1
5 TABLET ORAL DAILY
Qty: 30 TABLET | Refills: 11 | Status: SHIPPED | OUTPATIENT
Start: 2020-08-25 | End: 2020-09-24

## 2020-08-25 RX ORDER — VENLAFAXINE HYDROCHLORIDE 150 MG/1
CAPSULE, EXTENDED RELEASE ORAL
COMMUNITY
Start: 2020-08-24 | End: 2020-11-02

## 2020-08-25 RX ORDER — HYDROCODONE BITARTRATE AND ACETAMINOPHEN 5; 325 MG/1; MG/1
1 TABLET ORAL EVERY 6 HOURS PRN
COMMUNITY
Start: 2020-06-24 | End: 2020-11-02

## 2020-08-25 NOTE — LETTER
August 25, 2020      Jewels Hoskins PA-C  [address]  [place] [address]           [facility]  [address]  [place] [address]  Phone: [phone]  Fax: [phone]          Patient: Susu Luther   MR Number: [id number]   YOB: 1950   Date of Visit: 8/25/2020       Dear Jewels Hoskins:    Thank you for referring Susu Luther to me for evaluation. Attached you will find relevant portions of my assessment and plan of care.    If you have questions, please do not hesitate to call me. I look forward to following Susu Luther along with you.    Sincerely,    Jarad Gonzalez MD    Enclosure  CC:  No Recipients    If you would like to receive this communication electronically, please contact [email or web address] or [phone] to request more information on [facility] access.    For providers and/or their staff who would like to refer a patient to [facility], please contact us through our one-stop-shop provider referral line, [facility], at [phone].    If you feel you have received this communication in error or would no longer like to receive these types of communications, please e-mail [email or web address]

## 2020-08-25 NOTE — PROGRESS NOTES
Subjective:       Patient ID: Susu Luther is a 70 y.o. female.    Chief Complaint: Follow-up (3 week )    Pt is a 70 year old who is here for follow-up. Pt BP is well controlled. Pt encouraged to use the CPAP machine. She is currently seeing Hematology/Oncology for breast cancer. She does need ifobt test.. Cholesterol is stable    Review of Systems   Constitutional: Negative.  Negative for activity change, chills, fatigue and fever.   HENT: Negative.  Negative for nasal congestion, ear pain, postnasal drip, rhinorrhea, sinus pressure/congestion, sore throat and trouble swallowing.    Eyes: Negative for visual disturbance.   Respiratory: Negative.  Negative for cough, chest tightness, shortness of breath and wheezing.    Gastrointestinal: Negative.    Endocrine: Negative.  Negative for cold intolerance and heat intolerance.   Genitourinary: Negative.  Negative for dysuria, hematuria, urgency, vaginal bleeding and vaginal discharge.   Musculoskeletal: Negative for arthralgias, back pain, joint swelling and neck stiffness.   Integumentary:  Negative for color change and rash.   Neurological: Negative.  Negative for dizziness, tremors, seizures, syncope, weakness, light-headedness and headaches.   Hematological: Negative.    Psychiatric/Behavioral: Negative.  Negative for agitation, confusion, sleep disturbance and suicidal ideas. The patient is not nervous/anxious.          Objective:      Physical Exam  Constitutional:       Appearance: Normal appearance.   Cardiovascular:      Rate and Rhythm: Normal rate and regular rhythm.      Pulses: Normal pulses.      Heart sounds: Normal heart sounds. No murmur. No friction rub.   Pulmonary:      Effort: Pulmonary effort is normal. No respiratory distress.      Breath sounds: Normal breath sounds. No stridor.   Neurological:      Mental Status: She is alert.         Assessment:       1. Essential hypertension    2. Immunization due    3. NILS (obstructive sleep apnea)     4. Encounter for colorectal cancer screening        Plan:       Essential hypertension  Comments:  BP is well controlled  Orders:  -     Lipid Panel; Future; Expected date: 08/25/2020    Immunization due  Comments:  Will pneumo 13  Orders:  -     (In Office Administered) Pneumococcal Conjugate Vaccine (13 Valent) (IM)    NILS (obstructive sleep apnea)  Comments:  Encourage pt to use every night    Encounter for colorectal cancer screening  Comments:  Ifobt test  Orders:  -     Fecal Immunochemical Test (iFOBT); Future; Expected date: 08/25/2020

## 2020-08-25 NOTE — PROGRESS NOTES
Subjective:   Patient ID:  Susu Luther is a 70 y.o. female who presents for evaluation of Shortness of Breath      71 yo female, care establish. Prior cardiologist Dr ackerman   [facility] HTN, CVA (), Right breast CA, Lumpectomy 3/2019, h/o PE off OAC 2 yrs ago.  AAA, s/p transcutaneous patch by Dr. Bonds, obesity knee OA imbalanced walker dependent  C/o SOB after walking few steps and dizziness. Had vision issue 1 month ago due to uncontrolled HTN  No chest pain  Sleeps with 2 pillows  Decent appetites  Leg calf pain worse at night  No smoking/drinking  ekg today NSR LVH.    ECH normal EF, grade II DD, LAE and PAP 56 mmHG   Chest CTA negative for PE  BP high    A1c controlled      Past Medical History:   Diagnosis Date    Chronic diastolic congestive heart failure 2020    Encounter for blood transfusion     Hx of psychiatric care     Hypertension     Major depressive disorder, single episode, moderate with anxious distress 2020    Malignant neoplasm of upper-outer quadrant of right breast in female, estrogen receptor positive 3/14/2019    radiation    Psychiatric problem     Sleep difficulties     Stroke     no residual defect    Therapy        Past Surgical History:   Procedure Laterality Date    APPENDECTOMY      BREAST BIOPSY      2019    BREAST LUMPECTOMY      2019     SECTION      x 1    OOPHORECTOMY      right     SENTINEL LYMPH NODE BIOPSY Right 3/27/2019    Procedure: BIOPSY, LYMPH NODE, SENTINEL;  Surgeon: Artemio Starks MD;  Location: [facility];  Service: General;  Laterality: Right;       Social History     Tobacco Use    Smoking status: Never Smoker    Smokeless tobacco: Never Used   Substance Use Topics    Alcohol use: No    Drug use: No       Family History   Problem Relation Age of Onset    Diabetes Maternal Grandmother     Diabetes Maternal Grandfather     Diabetes Mother     Breast cancer Neg Hx     Colon cancer Neg Hx      Ovarian cancer Neg Hx        Review of Systems   Constitution: Negative for decreased appetite, diaphoresis, fever, malaise/fatigue and night sweats.   HENT: Negative for nosebleeds.    Eyes: Negative for blurred vision and double vision.   Cardiovascular: Positive for dyspnea on exertion. Negative for chest pain, claudication, irregular heartbeat, leg swelling, near-syncope, orthopnea, palpitations, paroxysmal nocturnal dyspnea and syncope.   Respiratory: Positive for shortness of breath. Negative for cough, sleep disturbances due to breathing, snoring, sputum production and wheezing.    Endocrine: Negative for cold intolerance and polyuria.   Hematologic/Lymphatic: Does not bruise/bleed easily.   Skin: Negative for rash.   Musculoskeletal: Negative for back pain, falls, joint pain, joint swelling and neck pain.   Gastrointestinal: Negative for abdominal pain, heartburn, nausea and vomiting.   Genitourinary: Negative for dysuria, frequency and hematuria.   Neurological: Negative for difficulty with concentration, dizziness, focal weakness, headaches, light-headedness, numbness, seizures and weakness.   Psychiatric/Behavioral: Negative for depression, memory loss and substance abuse. The patient does not have insomnia.    Allergic/Immunologic: Negative for HIV exposure and hives.       Objective:   Physical Exam   Constitutional: She is oriented to person, place, and time. She appears well-nourished.   HENT:   Head: Normocephalic.   Eyes: Pupils are equal, round, and reactive to light.   Neck: Normal carotid pulses and no JVD present. Carotid bruit is not present. No thyromegaly present.   Cardiovascular: Normal rate, regular rhythm, normal heart sounds and normal pulses.  No extrasystoles are present. PMI is not displaced. Exam reveals no gallop and no S3.   No murmur heard.  Pulmonary/Chest: Breath sounds normal. No stridor. No respiratory distress.   Abdominal: Soft. Bowel sounds are normal. There is no abdominal  tenderness. There is no rebound.   Musculoskeletal: Normal range of motion.   Neurological: She is alert and oriented to person, place, and time.   Skin: Skin is intact. No rash noted.   Psychiatric: Her behavior is normal.       Lab Results   Component Value Date    CHOL 229 (H) 05/18/2019     Lab Results   Component Value Date    HDL 43 05/18/2019     Lab Results   Component Value Date    LDLCALC 148.0 05/18/2019     Lab Results   Component Value Date    TRIG 190 (H) 05/18/2019     Lab Results   Component Value Date    CHOLHDL 18.8 (L) 05/18/2019       Chemistry        Component Value Date/Time     08/06/2020 1354    K 4.1 08/06/2020 1354     08/06/2020 1354    CO2 26 08/06/2020 1354    BUN 18 08/06/2020 1354    CREATININE 1.4 08/06/2020 1354     (H) 08/06/2020 1354        Component Value Date/Time    CALCIUM 10.0 08/06/2020 1354    ALKPHOS 163 (H) 08/06/2020 1354    AST 26 08/06/2020 1354    ALT 26 08/06/2020 1354    BILITOT 0.2 08/06/2020 1354    ESTGFRAFRICA 45 (A) 08/06/2020 1354    EGFRNONAA 39 (A) 08/06/2020 1354          Lab Results   Component Value Date    HGBA1C 5.9 (H) 05/18/2019     Lab Results   Component Value Date    TSH 1.170 12/16/2019     Lab Results   Component Value Date    INR 1.0 05/19/2019     Lab Results   Component Value Date    WBC 9.57 08/06/2020    HGB 12.5 08/06/2020    HCT 40.7 08/06/2020    MCV 84 08/06/2020     08/06/2020     BNP  @LABRCNTIP(BNP,BNPTRIAGEBLO)@  CrCl cannot be calculated (Patient's most recent lab result is older than the maximum 7 days allowed.).  No results found in the last 24 hours.  No results found in the last 24 hours.  No results found in the last 24 hours.    Assessment:      1. Essential hypertension    2. Cerebrovascular accident (CVA) due to thrombosis of precerebral artery    3. SOB (shortness of breath)    4. NILS (obstructive sleep apnea)    5. Chronic diastolic congestive heart failure        Plan:   BNP check  Add Amlodpine 5  mg daily  Continue ASA Statin, Lasix propanolol, HCTZ losartan hydralazine and clonidine  Counseled DASH  Check Lipid profile in 2 months  Recommend heart-healthy diet, weight control and regular exercise.  Rhiannon. Risk modification.   RTC in 3 months    I have reviewed all pertinent labs and cardiac studies independently. Plans and recommendations have been formulated under my direct supervision. All questions answered and patient voiced understanding.   If symptoms persist go to the ED

## 2020-08-27 ENCOUNTER — TELEPHONE (OUTPATIENT)
Dept: CARDIOLOGY | Facility: CLINIC | Age: 70
End: 2020-08-27

## 2020-08-27 ENCOUNTER — TELEPHONE (OUTPATIENT)
Dept: PHARMACY | Facility: CLINIC | Age: 70
End: 2020-08-27

## 2020-08-27 DIAGNOSIS — I63.00 CEREBROVASCULAR ACCIDENT (CVA) DUE TO THROMBOSIS OF PRECEREBRAL ARTERY: Primary | ICD-10-CM

## 2020-08-27 RX ORDER — EVOLOCUMAB 140 MG/ML
140 INJECTION, SOLUTION SUBCUTANEOUS
Qty: 2 SYRINGE | Refills: 5 | Status: ON HOLD | OUTPATIENT
Start: 2020-08-27 | End: 2021-03-01 | Stop reason: CLARIF

## 2020-08-27 NOTE — TELEPHONE ENCOUNTER
Pt contacted about results. Pt states that she is taking the lipitor? Please advise.            ----- Message from Jarad Gonzalez MD sent at 8/26/2020  6:02 PM CDT -----  Lipid high  Is pt taking Lipitor?

## 2020-08-27 NOTE — TELEPHONE ENCOUNTER
Pt contacted [facility] for pt to call back about results.        ----- Message from Jarad Gonzalez MD sent at 8/26/2020  6:02 PM CDT -----  Lipid high  Is pt taking Lipitor?

## 2020-08-29 PROCEDURE — G0179 PR HOME HEALTH MD RECERTIFICATION: ICD-10-PCS | Mod: ,,, | Performed by: FAMILY MEDICINE

## 2020-08-29 PROCEDURE — G0179 MD RECERTIFICATION HHA PT: HCPCS | Mod: ,,, | Performed by: FAMILY MEDICINE

## 2020-09-01 ENCOUNTER — EXTERNAL HOME HEALTH (OUTPATIENT)
Dept: HOME HEALTH SERVICES | Facility: HOSPITAL | Age: 70
End: 2020-09-01
Payer: COMMERCIAL

## 2020-09-04 ENCOUNTER — TELEPHONE (OUTPATIENT)
Dept: INTERNAL MEDICINE | Facility: CLINIC | Age: 70
End: 2020-09-04

## 2020-09-04 NOTE — TELEPHONE ENCOUNTER
Home health nurse called to report that pt bp has been high, along with experiencing headache and dizziness, reading today was 165/110.

## 2020-09-04 NOTE — TELEPHONE ENCOUNTER
----- Message from Rubi Soliz sent at 9/4/2020  2:34 PM CDT -----  Type:  Needs Medical Advice    Who Called: consuelo   Symptoms (please be specific): headache pressure 158/88   How long has patient had these symptoms:  3days   Pharmacy name and phone #:    Would the patient rather a call back or a response via [facility]? Callback   Best Call Back Number: [phone]  Additional Information:

## 2020-09-12 ENCOUNTER — TELEPHONE (OUTPATIENT)
Dept: PHARMACY | Facility: CLINIC | Age: 70
End: 2020-09-12

## 2020-09-24 ENCOUNTER — OFFICE VISIT (OUTPATIENT)
Dept: INTERNAL MEDICINE | Facility: CLINIC | Age: 70
End: 2020-09-24
Payer: MEDICARE

## 2020-09-24 ENCOUNTER — LAB VISIT (OUTPATIENT)
Dept: LAB | Facility: HOSPITAL | Age: 70
End: 2020-09-24
Attending: NURSE PRACTITIONER
Payer: COMMERCIAL

## 2020-09-24 VITALS
HEIGHT: 69 IN | HEART RATE: 83 BPM | TEMPERATURE: 97 F | BODY MASS INDEX: 43.4 KG/M2 | SYSTOLIC BLOOD PRESSURE: 144 MMHG | DIASTOLIC BLOOD PRESSURE: 92 MMHG | OXYGEN SATURATION: 98 % | WEIGHT: 293 LBS

## 2020-09-24 DIAGNOSIS — F41.9 ANXIETY: ICD-10-CM

## 2020-09-24 DIAGNOSIS — I10 ESSENTIAL HYPERTENSION: ICD-10-CM

## 2020-09-24 DIAGNOSIS — R25.1 TREMOR OF BOTH HANDS: Primary | ICD-10-CM

## 2020-09-24 DIAGNOSIS — Z86.73 HISTORY OF CVA (CEREBROVASCULAR ACCIDENT): ICD-10-CM

## 2020-09-24 PROCEDURE — 99214 OFFICE O/P EST MOD 30 MIN: CPT | Mod: S$GLB,,, | Performed by: NURSE PRACTITIONER

## 2020-09-24 PROCEDURE — 99999 PR PBB SHADOW E&M-EST. PATIENT-LVL V: ICD-10-PCS | Mod: PBBFAC,,, | Performed by: NURSE PRACTITIONER

## 2020-09-24 PROCEDURE — 80048 BASIC METABOLIC PNL TOTAL CA: CPT

## 2020-09-24 PROCEDURE — 36415 COLL VENOUS BLD VENIPUNCTURE: CPT

## 2020-09-24 PROCEDURE — 99214 PR OFFICE/OUTPT VISIT, EST, LEVL IV, 30-39 MIN: ICD-10-PCS | Mod: S$GLB,,, | Performed by: NURSE PRACTITIONER

## 2020-09-24 PROCEDURE — 99999 PR PBB SHADOW E&M-EST. PATIENT-LVL V: CPT | Mod: PBBFAC,,, | Performed by: NURSE PRACTITIONER

## 2020-09-24 RX ORDER — AMLODIPINE BESYLATE 10 MG/1
10 TABLET ORAL DAILY
Qty: 90 TABLET | Refills: 2 | Status: SHIPPED | OUTPATIENT
Start: 2020-09-24 | End: 2021-02-01 | Stop reason: SDUPTHER

## 2020-09-24 RX ORDER — DIAZEPAM 10 MG/1
10 TABLET ORAL 2 TIMES DAILY PRN
Qty: 60 TABLET | Refills: 0 | Status: SHIPPED | OUTPATIENT
Start: 2020-09-24 | End: 2020-10-28 | Stop reason: SDUPTHER

## 2020-09-24 RX ORDER — CLONIDINE HYDROCHLORIDE 0.2 MG/1
0.2 TABLET ORAL 3 TIMES DAILY
Qty: 270 TABLET | Refills: 2 | Status: SHIPPED | OUTPATIENT
Start: 2020-09-24 | End: 2020-12-28 | Stop reason: SDUPTHER

## 2020-09-24 NOTE — PROGRESS NOTES
Subjective:       Patient ID: Susu Luther is a 70 y.o. female.    Chief Complaint: Tremors (in both hands)    HPI    Txed by Dr. Balbina Alaniz on BB- for tremors, but states she wanted to find a new neuro MD, has not seen neuro in > 2-3 years. Was on medication but does not recall what meds she was on. Was scheduled to see Dr Razo in January- but she does not want to wait this long. She has a hx of CVA in 2009. Has been having blurred vision, slurred speech, BP fluctuations (intermittent) for months now.     HTN- pt is on multiple BP meds, seen in Aug by Dr. Gonzalez. norvasc added. Pt mentions that BP is still high at home 150s/160s/90s. Pt reports compliance with meds.                   Past Medical History:   Diagnosis Date    Chronic diastolic congestive heart failure 8/25/2020    Encounter for blood transfusion     Hx of psychiatric care     Hypertension     Major depressive disorder, single episode, moderate with anxious distress 1/14/2020    Malignant neoplasm of upper-outer quadrant of right breast in female, estrogen receptor positive 3/14/2019    radiation    Psychiatric problem     Sleep difficulties     Stroke 2009    no residual defect    Therapy        Social History     Socioeconomic History    Marital status:      Spouse name: Campos Luther    Number of children: 2    Years of education: Not on file    Highest education level: Not on file   Occupational History    Not on file   Social Needs    Financial resource strain: Not on file    Food insecurity     Worry: Not on file     Inability: Not on file    Transportation needs     Medical: Not on file     Non-medical: Not on file   Tobacco Use    Smoking status: Never Smoker    Smokeless tobacco: Never Used   Substance and Sexual Activity    Alcohol use: No    Drug use: No    Sexual activity: Yes     Partners: Male     Birth control/protection: Post-menopausal   Lifestyle    Physical activity     Days per week:  Not on file     Minutes per session: Not on file    Stress: Not on file   Relationships    Social connections     Talks on phone: Not on file     Gets together: Not on file     Attends Christianity service: Not on file     Active member of club or organization: Not on file     Attends meetings of clubs or organizations: Not on file     Relationship status: Not on file   Other Topics Concern    Patient feels they ought to cut down on drinking/drug use Not Asked    Patient annoyed by others criticizing their drinking/drug use Not Asked    Patient has felt bad or guilty about drinking/drug use Not Asked    Patient has had a drink/used drugs as an eye opener in the AM Not Asked   Social History Narrative    Not on file     Review of patient's allergies indicates:   Allergen Reactions    Pcn [penicillins] Rash     Current Outpatient Medications   Medication Sig    albuterol (VENTOLIN HFA) 90 mcg/actuation inhaler Inhale 2 puffs into the lungs every 4 (four) hours as needed for Shortness of Breath.    aspirin (ECOTRIN) 81 MG EC tablet Take 1 tablet (81 mg total) by mouth once daily. (Patient taking differently: Take 81 mg by mouth every other day. )    atorvastatin (LIPITOR) 40 MG tablet Take 1 tablet (40 mg total) by mouth once daily.    bismuth subsalicylate (PEPTO BISMOL) 262 mg/15 mL suspension Take 15 mLs by mouth every 6 (six) hours as needed for Indigestion.    blood pressure kit med and lrg Kit Take blood pressure first thing in the morning.    blood pressure kit med and lrg Kit Check blood pressure every morning    butalbital-acetaminophen-caff -40 mg -40 mg Cap Take 1 capsule by mouth three times daily as needed    cholecalciferol, vitamin D3, 50,000 unit capsule Take 50,000 Units by mouth once a week.     cloNIDine (CATAPRES) 0.2 MG tablet Take 1 tablet (0.2 mg total) by mouth 3 (three) times daily.    diazePAM (VALIUM) 10 MG Tab Take 1 tablet (10 mg total) by mouth 2 (two) times  daily as needed.    emollient combination no.63 (MIADERM) Lotn Apply 1 application topically 3 (three) times daily.    erythromycin (ROMYCIN) ophthalmic ointment APPLY TO EYELASHES AT BEDTIME    evolocumab (REPATHA SURECLICK) 140 mg/mL PnIj Inject 1 mL (140 mg total) into the skin every 14 (fourteen) days.    furosemide (LASIX) 20 MG tablet take 1/2 tablet by mouth a day.    gabapentin (NEURONTIN) 600 MG tablet     hydrALAZINE (APRESOLINE) 25 MG tablet Take 1 tablet (25 mg total) by mouth every 8 (eight) hours.    hydroCHLOROthiazide (HYDRODIURIL) 25 MG tablet Take 1 tablet (25 mg total) by mouth once daily.    HYDROcodone-acetaminophen (NORCO) 5-325 mg per tablet Take 1 tablet by mouth every 6 (six) hours as needed.    inhalation spacing device (COMPACT SPACE CHAMBER) Use as directed for inhalation.    letrozole (FEMARA) 2.5 mg Tab Take 1 tablet (2.5 mg total) by mouth once daily.    lidocaine (LIDODERM) 5 % Place 1 patch onto the skin daily as needed. Remove & Discard patch within 12 hours or as directed by MD    lidocaine HCL-menthoL 4-1 % PtMd Apply 1 patch topically daily as needed.    lidocaine-prilocaine (EMLA) cream     losartan (COZAAR) 100 MG tablet Take 1 tablet (100 mg total) by mouth once daily.    meclizine (ANTIVERT) 12.5 mg tablet Take 1 tablet by mouth three times daily as needed    multivitamin (ONE DAILY MULTIVITAMIN) per tablet Take 1 tablet by mouth once daily.    naproxen (NAPROSYN) 500 MG tablet Take 1 tablet (500 mg total) by mouth 2 (two) times daily with meals.    NARCAN 4 mg/actuation [place] CALL 911. ADMINISTER A SINGLE SPRAY INTRANASALLY INTO ONE NOSTRIL UPON SIGNS OF OPIOID OVERDOSE. MAY REPEAT AFTER 3 MINUTES IF NO RESPONSE.    ofloxacin (OCUFLOX) 0.3 % ophthalmic solution INSTILL 1 DROP INTO EACH EYE 4 TIMES DAILY    OLANZapine (ZYPREXA) 10 MG tablet Take 10 mg by mouth nightly.    oxyCODONE-acetaminophen (PERCOCET) 7.5-325 mg per tablet Take 1 tablet by mouth every  8 (eight) hours as needed for Pain.    prednisoLONE acetate (PRED FORTE) 1 % DrpS INSTILL 1 DROP INTO EACH EYE THREE TIMES DAILY    propranolol (INDERAL) 40 MG tablet Take 1 tablet (40 mg total) by mouth 2 (two) times daily.    sertraline (ZOLOFT) 50 MG tablet     venlafaxine (EFFEXOR-XR) 150 MG Cp24     venlafaxine (EFFEXOR-XR) 75 MG 24 hr capsule Take 1 capsule (75 mg total) by mouth once daily.    amLODIPine (NORVASC) 10 MG tablet Take 1 tablet (10 mg total) by mouth once daily.     No current facility-administered medications for this visit.            Review of Systems   Constitutional: Negative for activity change, appetite change, chills, diaphoresis, fatigue, fever and unexpected weight change.   HENT: Negative for congestion, ear pain, postnasal drip, rhinorrhea, sinus pressure, sinus pain, sneezing, sore throat, tinnitus, trouble swallowing and voice change.    Eyes: Negative for photophobia, pain and visual disturbance.   Respiratory: Negative for cough, chest tightness, shortness of breath and wheezing.    Cardiovascular: Negative for chest pain, palpitations and leg swelling.   Gastrointestinal: Negative for abdominal distention, abdominal pain, constipation, diarrhea, nausea and vomiting.   Genitourinary: Negative for decreased urine volume, difficulty urinating, dysuria, flank pain, frequency, hematuria and urgency.   Musculoskeletal: Negative for arthralgias, back pain, joint swelling, neck pain and neck stiffness.   Allergic/Immunologic: Negative for immunocompromised state.   Neurological: Negative for dizziness, tremors, seizures, syncope, facial asymmetry, speech difficulty, weakness, light-headedness, numbness and headaches.        See HPI   Hematological: Negative for adenopathy. Does not bruise/bleed easily.   Psychiatric/Behavioral: Negative for confusion and sleep disturbance.       Objective:      Physical Exam  Vitals signs reviewed.   Cardiovascular:      Rate and Rhythm: Normal  rate and regular rhythm.      Pulses: Normal pulses.      Heart sounds: Normal heart sounds.   Pulmonary:      Effort: Pulmonary effort is normal.      Breath sounds: Normal breath sounds.   Musculoskeletal:      Comments: Tremors of both arms/hands noted.   Skin:     General: Skin is warm and dry.   Neurological:      General: No focal deficit present.      Mental Status: She is alert and oriented to person, place, and time.   Psychiatric:         Mood and Affect: Mood normal.         Assessment:     Vitals:    09/24/20 1042   BP: (!) 144/92   Pulse: 83   Temp: 96.8 °F (36 °C)         1. Tremor of both hands    2. Essential hypertension    3. History of CVA (cerebrovascular accident)    4. Anxiety        Plan:   Tremor of both hands  -     Ambulatory referral/consult to Neurology; Future; Expected date: 10/01/2020    Essential hypertension  -     cloNIDine (CATAPRES) 0.2 MG tablet; Take 1 tablet (0.2 mg total) by mouth 3 (three) times daily.  Dispense: 270 tablet; Refill: 2  -     Basic metabolic panel; Future; Expected date: 09/24/2020    History of CVA (cerebrovascular accident)    Anxiety  -     diazePAM (VALIUM) 10 MG Tab; Take 1 tablet (10 mg total) by mouth 2 (two) times daily as needed.  Dispense: 60 tablet; Refill: 0    Other orders  -     amLODIPine (NORVASC) 10 MG tablet; Take 1 tablet (10 mg total) by mouth once daily.  Dispense: 90 tablet; Refill: 2      Increase norvasc from 5 mg to 10 mg  Monitor BP closely at home- has upcoming appt with PCP   Refer to external neuro for sooner appt in regard to tremors- MRI prev ordered scheduled.

## 2020-09-25 LAB
ANION GAP SERPL CALC-SCNC: 10 MMOL/L (ref 8–16)
BUN SERPL-MCNC: 15 MG/DL (ref 8–23)
CALCIUM SERPL-MCNC: 9.6 MG/DL (ref 8.7–10.5)
CHLORIDE SERPL-SCNC: 107 MMOL/L (ref 95–110)
CO2 SERPL-SCNC: 25 MMOL/L (ref 23–29)
CREAT SERPL-MCNC: 1.2 MG/DL (ref 0.5–1.4)
EST. GFR  (AFRICAN AMERICAN): 53.7 ML/MIN/1.73 M^2
EST. GFR  (NON AFRICAN AMERICAN): 46.5 ML/MIN/1.73 M^2
GLUCOSE SERPL-MCNC: 78 MG/DL (ref 70–110)
POTASSIUM SERPL-SCNC: 4.5 MMOL/L (ref 3.5–5.1)
SODIUM SERPL-SCNC: 142 MMOL/L (ref 136–145)

## 2020-10-03 ENCOUNTER — IMMUNIZATION (OUTPATIENT)
Dept: INTERNAL MEDICINE | Facility: CLINIC | Age: 70
End: 2020-10-03
Payer: MEDICARE

## 2020-10-03 ENCOUNTER — HOSPITAL ENCOUNTER (OUTPATIENT)
Dept: RADIOLOGY | Facility: HOSPITAL | Age: 70
Discharge: HOME | End: 2020-10-03
Attending: INTERNAL MEDICINE
Payer: MEDICARE

## 2020-10-03 DIAGNOSIS — R51.9 BILATERAL HEADACHES: ICD-10-CM

## 2020-10-03 PROCEDURE — G0008 ADMIN INFLUENZA VIRUS VAC: HCPCS | Mod: PBBFAC

## 2020-10-03 PROCEDURE — 70553 MRI BRAIN W WO CONTRAST: ICD-10-PCS | Mod: 26,,, | Performed by: RADIOLOGY

## 2020-10-03 PROCEDURE — 70553 MRI BRAIN STEM W/O & W/DYE: CPT | Mod: 26,,, | Performed by: RADIOLOGY

## 2020-10-03 PROCEDURE — 90694 VACC AIIV4 NO PRSRV 0.5ML IM: CPT | Mod: PBBFAC

## 2020-10-03 PROCEDURE — 25500020 PHARM REV CODE 255: Performed by: INTERNAL MEDICINE

## 2020-10-03 PROCEDURE — A9585 GADOBUTROL INJECTION: HCPCS | Performed by: INTERNAL MEDICINE

## 2020-10-03 PROCEDURE — 70553 MRI BRAIN STEM W/O & W/DYE: CPT | Mod: TC

## 2020-10-03 RX ADMIN — GADOBUTROL 10 ML: 604.72 INJECTION INTRAVENOUS at 11:10

## 2020-10-05 RX ORDER — GADOBUTROL 604.72 MG/ML
10 INJECTION INTRAVENOUS
Status: COMPLETED | OUTPATIENT
Start: 2020-10-03 | End: 2020-10-03

## 2020-10-06 ENCOUNTER — PATIENT MESSAGE (OUTPATIENT)
Dept: HEMATOLOGY/ONCOLOGY | Facility: CLINIC | Age: 70
End: 2020-10-06

## 2020-10-06 DIAGNOSIS — C50.411 MALIGNANT NEOPLASM OF UPPER-OUTER QUADRANT OF RIGHT BREAST IN FEMALE, ESTROGEN RECEPTOR POSITIVE: Primary | Chronic | ICD-10-CM

## 2020-10-06 DIAGNOSIS — Z17.0 MALIGNANT NEOPLASM OF UPPER-OUTER QUADRANT OF RIGHT BREAST IN FEMALE, ESTROGEN RECEPTOR POSITIVE: Primary | Chronic | ICD-10-CM

## 2020-10-06 RX ORDER — TRAMADOL HYDROCHLORIDE 50 MG/1
50 TABLET ORAL EVERY 6 HOURS PRN
COMMUNITY
End: 2020-10-06 | Stop reason: SDUPTHER

## 2020-10-06 RX ORDER — TRAMADOL HYDROCHLORIDE 50 MG/1
50 TABLET ORAL EVERY 6 HOURS PRN
Qty: 90 TABLET | Refills: 0 | Status: SHIPPED | OUTPATIENT
Start: 2020-10-06 | End: 2020-10-08 | Stop reason: SDUPTHER

## 2020-10-08 ENCOUNTER — PATIENT MESSAGE (OUTPATIENT)
Dept: HEMATOLOGY/ONCOLOGY | Facility: CLINIC | Age: 70
End: 2020-10-08

## 2020-10-08 DIAGNOSIS — C50.411 MALIGNANT NEOPLASM OF UPPER-OUTER QUADRANT OF RIGHT BREAST IN FEMALE, ESTROGEN RECEPTOR POSITIVE: Chronic | ICD-10-CM

## 2020-10-08 DIAGNOSIS — Z17.0 MALIGNANT NEOPLASM OF UPPER-OUTER QUADRANT OF RIGHT BREAST IN FEMALE, ESTROGEN RECEPTOR POSITIVE: Chronic | ICD-10-CM

## 2020-10-08 RX ORDER — TRAMADOL HYDROCHLORIDE 50 MG/1
50 TABLET ORAL EVERY 6 HOURS PRN
Qty: 90 TABLET | Refills: 0 | Status: SHIPPED | OUTPATIENT
Start: 2020-10-08 | End: 2020-11-05 | Stop reason: SDUPTHER

## 2020-10-09 ENCOUNTER — PATIENT MESSAGE (OUTPATIENT)
Dept: HEMATOLOGY/ONCOLOGY | Facility: CLINIC | Age: 70
End: 2020-10-09

## 2020-10-09 ENCOUNTER — TELEPHONE (OUTPATIENT)
Dept: PHARMACY | Facility: CLINIC | Age: 70
End: 2020-10-09

## 2020-10-19 ENCOUNTER — LAB VISIT (OUTPATIENT)
Dept: LAB | Facility: HOSPITAL | Age: 70
End: 2020-10-19
Attending: INTERNAL MEDICINE
Payer: MEDICARE

## 2020-10-19 ENCOUNTER — OFFICE VISIT (OUTPATIENT)
Dept: HEMATOLOGY/ONCOLOGY | Facility: CLINIC | Age: 70
End: 2020-10-19
Payer: MEDICARE

## 2020-10-19 VITALS
DIASTOLIC BLOOD PRESSURE: 95 MMHG | SYSTOLIC BLOOD PRESSURE: 147 MMHG | HEIGHT: 70 IN | TEMPERATURE: 97 F | WEIGHT: 293 LBS | BODY MASS INDEX: 41.95 KG/M2 | RESPIRATION RATE: 16 BRPM | OXYGEN SATURATION: 98 % | HEART RATE: 77 BPM

## 2020-10-19 DIAGNOSIS — G47.00 INSOMNIA, UNSPECIFIED TYPE: Primary | ICD-10-CM

## 2020-10-19 DIAGNOSIS — Z17.0 MALIGNANT NEOPLASM OF UPPER-OUTER QUADRANT OF RIGHT BREAST IN FEMALE, ESTROGEN RECEPTOR POSITIVE: Chronic | ICD-10-CM

## 2020-10-19 DIAGNOSIS — C50.411 MALIGNANT NEOPLASM OF UPPER-OUTER QUADRANT OF RIGHT BREAST IN FEMALE, ESTROGEN RECEPTOR POSITIVE: Chronic | ICD-10-CM

## 2020-10-19 DIAGNOSIS — I10 ESSENTIAL HYPERTENSION: ICD-10-CM

## 2020-10-19 LAB
ALBUMIN SERPL BCP-MCNC: 3.4 G/DL (ref 3.5–5.2)
ALP SERPL-CCNC: 179 U/L (ref 55–135)
ALT SERPL W/O P-5'-P-CCNC: 24 U/L (ref 10–44)
ANION GAP SERPL CALC-SCNC: 8 MMOL/L (ref 8–16)
AST SERPL-CCNC: 21 U/L (ref 10–40)
BASOPHILS # BLD AUTO: 0.08 K/UL (ref 0–0.2)
BASOPHILS NFR BLD: 0.9 % (ref 0–1.9)
BILIRUB SERPL-MCNC: 0.3 MG/DL (ref 0.1–1)
BUN SERPL-MCNC: 14 MG/DL (ref 8–23)
CALCIUM SERPL-MCNC: 9.9 MG/DL (ref 8.7–10.5)
CHLORIDE SERPL-SCNC: 106 MMOL/L (ref 95–110)
CHOLEST SERPL-MCNC: 221 MG/DL (ref 120–199)
CHOLEST/HDLC SERPL: 4 {RATIO} (ref 2–5)
CO2 SERPL-SCNC: 26 MMOL/L (ref 23–29)
CREAT SERPL-MCNC: 1.2 MG/DL (ref 0.5–1.4)
DIFFERENTIAL METHOD: ABNORMAL
EOSINOPHIL # BLD AUTO: 0.2 K/UL (ref 0–0.5)
EOSINOPHIL NFR BLD: 1.6 % (ref 0–8)
ERYTHROCYTE [DISTWIDTH] IN BLOOD BY AUTOMATED COUNT: 15.9 % (ref 11.5–14.5)
EST. GFR  (AFRICAN AMERICAN): 54 ML/MIN/1.73 M^2
EST. GFR  (NON AFRICAN AMERICAN): 47 ML/MIN/1.73 M^2
GLUCOSE SERPL-MCNC: 73 MG/DL (ref 70–110)
HCT VFR BLD AUTO: 39.8 % (ref 37–48.5)
HDLC SERPL-MCNC: 55 MG/DL (ref 40–75)
HDLC SERPL: 24.9 % (ref 20–50)
HGB BLD-MCNC: 12 G/DL (ref 12–16)
IMM GRANULOCYTES # BLD AUTO: 0.06 K/UL (ref 0–0.04)
IMM GRANULOCYTES NFR BLD AUTO: 0.7 % (ref 0–0.5)
LDLC SERPL CALC-MCNC: 137.4 MG/DL (ref 63–159)
LYMPHOCYTES # BLD AUTO: 2 K/UL (ref 1–4.8)
LYMPHOCYTES NFR BLD: 21.6 % (ref 18–48)
MCH RBC QN AUTO: 25 PG (ref 27–31)
MCHC RBC AUTO-ENTMCNC: 30.2 G/DL (ref 32–36)
MCV RBC AUTO: 83 FL (ref 82–98)
MONOCYTES # BLD AUTO: 0.7 K/UL (ref 0.3–1)
MONOCYTES NFR BLD: 7.4 % (ref 4–15)
NEUTROPHILS # BLD AUTO: 6.2 K/UL (ref 1.8–7.7)
NEUTROPHILS NFR BLD: 67.8 % (ref 38–73)
NONHDLC SERPL-MCNC: 166 MG/DL
NRBC BLD-RTO: 0 /100 WBC
PLATELET # BLD AUTO: 305 K/UL (ref 150–350)
PMV BLD AUTO: 9.3 FL (ref 9.2–12.9)
POTASSIUM SERPL-SCNC: 4.3 MMOL/L (ref 3.5–5.1)
PROT SERPL-MCNC: 8.1 G/DL (ref 6–8.4)
RBC # BLD AUTO: 4.8 M/UL (ref 4–5.4)
SODIUM SERPL-SCNC: 140 MMOL/L (ref 136–145)
TRIGL SERPL-MCNC: 143 MG/DL (ref 30–150)
WBC # BLD AUTO: 9.2 K/UL (ref 3.9–12.7)

## 2020-10-19 PROCEDURE — 80061 LIPID PANEL: CPT

## 2020-10-19 PROCEDURE — 99999 PR PBB SHADOW E&M-EST. PATIENT-LVL V: CPT | Mod: PBBFAC,,, | Performed by: NURSE PRACTITIONER

## 2020-10-19 PROCEDURE — 85025 COMPLETE CBC W/AUTO DIFF WBC: CPT

## 2020-10-19 PROCEDURE — 99214 OFFICE O/P EST MOD 30 MIN: CPT | Mod: S$PBB,,, | Performed by: NURSE PRACTITIONER

## 2020-10-19 PROCEDURE — 36415 COLL VENOUS BLD VENIPUNCTURE: CPT

## 2020-10-19 PROCEDURE — 80053 COMPREHEN METABOLIC PANEL: CPT

## 2020-10-19 PROCEDURE — 99215 OFFICE O/P EST HI 40 MIN: CPT | Mod: PBBFAC | Performed by: NURSE PRACTITIONER

## 2020-10-19 PROCEDURE — 99999 PR PBB SHADOW E&M-EST. PATIENT-LVL V: ICD-10-PCS | Mod: PBBFAC,,, | Performed by: NURSE PRACTITIONER

## 2020-10-19 PROCEDURE — 99214 PR OFFICE/OUTPT VISIT, EST, LEVL IV, 30-39 MIN: ICD-10-PCS | Mod: S$PBB,,, | Performed by: NURSE PRACTITIONER

## 2020-10-19 RX ORDER — TRAZODONE HYDROCHLORIDE 50 MG/1
50 TABLET ORAL NIGHTLY
Qty: 30 TABLET | Refills: 0 | Status: SHIPPED | OUTPATIENT
Start: 2020-10-19 | End: 2020-10-19 | Stop reason: ALTCHOICE

## 2020-10-19 RX ORDER — TEMAZEPAM 7.5 MG/1
15 CAPSULE ORAL NIGHTLY PRN
Qty: 30 CAPSULE | Refills: 0 | Status: SHIPPED | OUTPATIENT
Start: 2020-10-19 | End: 2020-11-02

## 2020-10-19 NOTE — PROGRESS NOTES
Subjective:       Patient ID: Susu Luther is a 70 y.o. female.    Chief Complaint: f/u h/o breast cancer    HPI: 70 y.o female with history of right breast cancer, invasive lobular, diagnosed March 4, 2019.  Stage T2 N0 invasive lobular.  ER positive IL negative her 2-negative nodes status post lumpectomy.       Oncotype recurrence score 22.  Completed adjuvant radiation June 18, 2019  Started Arimidex June 2019.  August 26, 2019 patient was switched to letrozole due to persistent nausea on Arimidex.     Today's visit:  Patient reports insomnia non responsive to melatonin.      Social History     Socioeconomic History    Marital status:      Spouse name: Campos Luther    Number of children: 2    Years of education: Not on file    Highest education level: Not on file   Occupational History    Not on file   Social Needs    Financial resource strain: Not on file    Food insecurity     Worry: Not on file     Inability: Not on file    Transportation needs     Medical: Not on file     Non-medical: Not on file   Tobacco Use    Smoking status: Never Smoker    Smokeless tobacco: Never Used   Substance and Sexual Activity    Alcohol use: No    Drug use: No    Sexual activity: Yes     Partners: Male     Birth control/protection: Post-menopausal   Lifestyle    Physical activity     Days per week: Not on file     Minutes per session: Not on file    Stress: Not on file   Relationships    Social connections     Talks on phone: Not on file     Gets together: Not on file     Attends Taoism service: Not on file     Active member of club or organization: Not on file     Attends meetings of clubs or organizations: Not on file     Relationship status: Not on file   Other Topics Concern    Patient feels they ought to cut down on drinking/drug use Not Asked    Patient annoyed by others criticizing their drinking/drug use Not Asked    Patient has felt bad or guilty about drinking/drug use Not Asked     Patient has had a drink/used drugs as an eye opener in the AM Not Asked   Social History Narrative    Not on file       Past Medical History:   Diagnosis Date    Chronic diastolic congestive heart failure 2020    Encounter for blood transfusion     Hx of psychiatric care     Hypertension     Major depressive disorder, single episode, moderate with anxious distress 2020    Malignant neoplasm of upper-outer quadrant of right breast in female, estrogen receptor positive 3/14/2019    radiation    Psychiatric problem     Sleep difficulties     Stroke 2009    no residual defect    Therapy        Family History   Problem Relation Age of Onset    Diabetes Maternal Grandmother     Diabetes Maternal Grandfather     Diabetes Mother     Breast cancer Neg Hx     Colon cancer Neg Hx     Ovarian cancer Neg Hx        Past Surgical History:   Procedure Laterality Date    APPENDECTOMY      BREAST BIOPSY      2019    BREAST LUMPECTOMY      2019     SECTION      x 1    OOPHORECTOMY      right     SENTINEL LYMPH NODE BIOPSY Right 3/27/2019    Procedure: BIOPSY, LYMPH NODE, SENTINEL;  Surgeon: Artemio Starks MD;  Location: [facility];  Service: General;  Laterality: Right;       Review of Systems   Constitutional: Positive for fatigue. Negative for activity change, appetite change, chills, fever and unexpected weight change.   HENT: Negative for congestion, mouth sores, nosebleeds, sore throat, trouble swallowing and voice change.    Eyes: Negative for photophobia and visual disturbance.   Respiratory: Negative for cough, chest tightness, shortness of breath and wheezing.    Cardiovascular: Negative for chest pain and leg swelling.   Gastrointestinal: Negative for abdominal distention, abdominal pain, anal bleeding, blood in stool, constipation, diarrhea, nausea and vomiting.   Genitourinary: Negative for difficulty urinating, dysuria and hematuria.   Musculoskeletal: Positive for arthralgias.  Negative for back pain and myalgias.   Skin: Negative for pallor, rash and wound.   Neurological: Positive for headaches. Negative for dizziness, syncope and weakness.   Hematological: Negative for adenopathy. Does not bruise/bleed easily.   Psychiatric/Behavioral: Positive for sleep disturbance. The patient is nervous/anxious.          Medication List with Changes/Refills   New Medications    TEMAZEPAM (RESTORIL) 7.5 MG CAP    Take 2 capsules (15 mg total) by mouth nightly as needed.   Current Medications    ALBUTEROL (VENTOLIN HFA) 90 MCG/ACTUATION INHALER    Inhale 2 puffs into the lungs every 4 (four) hours as needed for Shortness of Breath.    AMLODIPINE (NORVASC) 10 MG TABLET    Take 1 tablet (10 mg total) by mouth once daily.    ASPIRIN (ECOTRIN) 81 MG EC TABLET    Take 1 tablet (81 mg total) by mouth once daily.    ATORVASTATIN (LIPITOR) 40 MG TABLET    Take 1 tablet (40 mg total) by mouth once daily.    BISMUTH SUBSALICYLATE (PEPTO BISMOL) 262 MG/15 ML SUSPENSION    Take 15 mLs by mouth every 6 (six) hours as needed for Indigestion.    BLOOD PRESSURE KIT MED AND LRG KIT    Take blood pressure first thing in the morning.    BLOOD PRESSURE KIT MED AND LRG KIT    Check blood pressure every morning    BUTALBITAL-ACETAMINOPHEN-CAFF -40 MG -40 MG CAP    Take 1 capsule by mouth three times daily as needed    CHOLECALCIFEROL, VITAMIN D3, 50,000 UNIT CAPSULE    Take 50,000 Units by mouth once a week.     CLONIDINE (CATAPRES) 0.2 MG TABLET    Take 1 tablet (0.2 mg total) by mouth 3 (three) times daily.    DIAZEPAM (VALIUM) 10 MG TAB    Take 1 tablet (10 mg total) by mouth 2 (two) times daily as needed.    EMOLLIENT COMBINATION NO.63 (MIADERM) LOTN    Apply 1 application topically 3 (three) times daily.    ERYTHROMYCIN (ROMYCIN) OPHTHALMIC OINTMENT    APPLY TO EYELASHES AT BEDTIME    EVOLOCUMAB (REPATHA SURECLICK) 140 MG/ML PNIJ    Inject 1 mL (140 mg total) into the skin every 14 (fourteen) days.     FUROSEMIDE (LASIX) 20 MG TABLET    take 1/2 tablet by mouth a day.    GABAPENTIN (NEURONTIN) 600 MG TABLET        HYDRALAZINE (APRESOLINE) 25 MG TABLET    Take 1 tablet (25 mg total) by mouth every 8 (eight) hours.    HYDROCHLOROTHIAZIDE (HYDRODIURIL) 25 MG TABLET    Take 1 tablet (25 mg total) by mouth once daily.    HYDROCODONE-ACETAMINOPHEN (NORCO) 5-325 MG PER TABLET    Take 1 tablet by mouth every 6 (six) hours as needed.    INHALATION SPACING DEVICE (COMPACT SPACE CHAMBER)    Use as directed for inhalation.    LETROZOLE (FEMARA) 2.5 MG TAB    Take 1 tablet (2.5 mg total) by mouth once daily.    LIDOCAINE (LIDODERM) 5 %    Place 1 patch onto the skin daily as needed. Remove & Discard patch within 12 hours or as directed by MD    LIDOCAINE HCL-MENTHOL 4-1 % PTMD    Apply 1 patch topically daily as needed.    LIDOCAINE-PRILOCAINE (EMLA) CREAM        LOSARTAN (COZAAR) 100 MG TABLET    Take 1 tablet by mouth once daily    MECLIZINE (ANTIVERT) 12.5 MG TABLET    Take 1 tablet by mouth three times daily as needed    MULTIVITAMIN (ONE DAILY MULTIVITAMIN) PER TABLET    Take 1 tablet by mouth once daily.    NAPROXEN (NAPROSYN) 500 MG TABLET    Take 1 tablet (500 mg total) by mouth 2 (two) times daily with meals.    NARCAN 4 MG/ACTUATION SPRY    CALL 911. ADMINISTER A SINGLE SPRAY INTRANASALLY INTO ONE NOSTRIL UPON SIGNS OF OPIOID OVERDOSE. MAY REPEAT AFTER 3 MINUTES IF NO RESPONSE.    OFLOXACIN (OCUFLOX) 0.3 % OPHTHALMIC SOLUTION    INSTILL 1 DROP INTO EACH EYE 4 TIMES DAILY    OLANZAPINE (ZYPREXA) 10 MG TABLET    Take 10 mg by mouth nightly.    OXYCODONE-ACETAMINOPHEN (PERCOCET) 7.5-325 MG PER TABLET    Take 1 tablet by mouth every 8 (eight) hours as needed for Pain.    PREDNISOLONE ACETATE (PRED FORTE) 1 % DRPS    INSTILL 1 DROP INTO EACH EYE THREE TIMES DAILY    PROPRANOLOL (INDERAL) 40 MG TABLET    Take 1 tablet (40 mg total) by mouth 2 (two) times daily.    SERTRALINE (ZOLOFT) 50 MG TABLET        TRAMADOL (ULTRAM)  50 MG TABLET    Take 1 tablet (50 mg total) by mouth every 6 (six) hours as needed.    VENLAFAXINE (EFFEXOR-XR) 150 MG CP24        VENLAFAXINE (EFFEXOR-XR) 75 MG 24 HR CAPSULE    Take 1 capsule (75 mg total) by mouth once daily.     Objective:     Vitals:    10/19/20 1332   BP: (!) 147/95   Pulse: 77   Resp: 16   Temp: 97.4 °F (36.3 °C)     Lab Results   Component Value Date    WBC 9.20 10/19/2020    HGB 12.0 10/19/2020    HCT 39.8 10/19/2020    MCV 83 10/19/2020     10/19/2020       BMP  Lab Results   Component Value Date     10/19/2020    K 4.3 10/19/2020     10/19/2020    CO2 26 10/19/2020    BUN 14 10/19/2020    CREATININE 1.2 10/19/2020    CALCIUM 9.9 10/19/2020    ANIONGAP 8 10/19/2020    ESTGFRAFRICA 54 (A) 10/19/2020    EGFRNONAA 47 (A) 10/19/2020     Lab Results   Component Value Date    ALT 24 10/19/2020    AST 21 10/19/2020    ALKPHOS 179 (H) 10/19/2020    BILITOT 0.3 10/19/2020         Physical Exam  Vitals signs reviewed.   Constitutional:       Appearance: She is well-developed.   HENT:      Head: Normocephalic.      Right Ear: External ear normal.      Left Ear: External ear normal.   Eyes:      General: Lids are normal. No scleral icterus.        Right eye: No discharge.         Left eye: No discharge.      Conjunctiva/sclera: Conjunctivae normal.   Neck:      Musculoskeletal: Normal range of motion.      Thyroid: No thyroid mass.   Cardiovascular:      Rate and Rhythm: Normal rate and regular rhythm.      Heart sounds: Normal heart sounds. No murmur.   Pulmonary:      Effort: Pulmonary effort is normal. No respiratory distress.      Breath sounds: Normal breath sounds. No wheezing, rhonchi or rales.   Abdominal:      General: Bowel sounds are normal. There is no distension.      Palpations: Abdomen is soft.      Tenderness: There is no abdominal tenderness.   Genitourinary:     Comments: deferred  Musculoskeletal: Normal range of motion.   Lymphadenopathy:      Head:      Right  side of head: No submandibular, preauricular or posterior auricular adenopathy.      Left side of head: No submandibular, preauricular or posterior auricular adenopathy.      Cervical:      Right cervical: No superficial cervical adenopathy.     Left cervical: No superficial cervical adenopathy.   Skin:     General: Skin is warm and dry.   Neurological:      Mental Status: She is alert and oriented to person, place, and time.   Psychiatric:         Speech: Speech normal.         Behavior: Behavior normal. Behavior is cooperative.         Thought Content: Thought content normal.          Assessment:     Problem List Items Addressed This Visit        Oncology    Malignant neoplasm of upper-outer quadrant of right breast in female, estrogen receptor positive (Chronic)     Continue Letrozole. Persistent elevation in Alk phos level. PET pending insurance approval. Patient reports recently submitting new insurance documentation for this. Would like to schedule PET ASAP with follow up after to discuss results.     F/u 2 months with labs or sooner PRN           Other Visit Diagnoses     Insomnia, unspecified type    -  Primary    Relevant Medications    temazepam (RESTORIL) 7.5 MG Cap            Plan:     Insomnia, unspecified type  -     Discontinue: traZODone (DESYREL) 50 MG tablet; Take 1 tablet (50 mg total) by mouth nightly.  Dispense: 30 tablet; Refill: 0  -     temazepam (RESTORIL) 7.5 MG Cap; Take 2 capsules (15 mg total) by mouth nightly as needed.  Dispense: 30 capsule; Refill: 0    Malignant neoplasm of upper-outer quadrant of right breast in female, estrogen receptor positive              JACKELINE Middleton-C

## 2020-10-19 NOTE — ASSESSMENT & PLAN NOTE
Continue Letrozole. Persistent elevation in Alk phos level. PET pending insurance approval. Patient reports recently submitting new insurance documentation for this. Would like to schedule PET ASAP with follow up after to discuss results.     F/u 2 months with labs or sooner PRN

## 2020-10-27 ENCOUNTER — TELEPHONE (OUTPATIENT)
Dept: RADIOLOGY | Facility: HOSPITAL | Age: 70
End: 2020-10-27

## 2020-10-27 DIAGNOSIS — Z17.0 MALIGNANT NEOPLASM OF UPPER-OUTER QUADRANT OF RIGHT BREAST IN FEMALE, ESTROGEN RECEPTOR POSITIVE: Primary | Chronic | ICD-10-CM

## 2020-10-27 DIAGNOSIS — C50.411 MALIGNANT NEOPLASM OF UPPER-OUTER QUADRANT OF RIGHT BREAST IN FEMALE, ESTROGEN RECEPTOR POSITIVE: Primary | Chronic | ICD-10-CM

## 2020-10-28 ENCOUNTER — OFFICE VISIT (OUTPATIENT)
Dept: INTERNAL MEDICINE | Facility: CLINIC | Age: 70
End: 2020-10-28
Payer: MEDICARE

## 2020-10-28 VITALS
WEIGHT: 291.88 LBS | HEART RATE: 55 BPM | SYSTOLIC BLOOD PRESSURE: 132 MMHG | TEMPERATURE: 97 F | BODY MASS INDEX: 41.78 KG/M2 | OXYGEN SATURATION: 98 % | DIASTOLIC BLOOD PRESSURE: 88 MMHG | HEIGHT: 70 IN

## 2020-10-28 DIAGNOSIS — K82.8 PORCELAIN GALLBLADDER: Primary | ICD-10-CM

## 2020-10-28 DIAGNOSIS — I10 ESSENTIAL HYPERTENSION: ICD-10-CM

## 2020-10-28 DIAGNOSIS — C50.411 MALIGNANT NEOPLASM OF UPPER-OUTER QUADRANT OF RIGHT BREAST IN FEMALE, ESTROGEN RECEPTOR POSITIVE: Chronic | ICD-10-CM

## 2020-10-28 DIAGNOSIS — G47.00 INSOMNIA, UNSPECIFIED TYPE: ICD-10-CM

## 2020-10-28 DIAGNOSIS — F41.9 ANXIETY: ICD-10-CM

## 2020-10-28 DIAGNOSIS — Z17.0 MALIGNANT NEOPLASM OF UPPER-OUTER QUADRANT OF RIGHT BREAST IN FEMALE, ESTROGEN RECEPTOR POSITIVE: Chronic | ICD-10-CM

## 2020-10-28 PROCEDURE — 99213 OFFICE O/P EST LOW 20 MIN: CPT | Mod: PBBFAC | Performed by: FAMILY MEDICINE

## 2020-10-28 PROCEDURE — G0179 MD RECERTIFICATION HHA PT: HCPCS | Mod: ,,, | Performed by: FAMILY MEDICINE

## 2020-10-28 PROCEDURE — 99999 PR PBB SHADOW E&M-EST. PATIENT-LVL III: CPT | Mod: PBBFAC,,, | Performed by: FAMILY MEDICINE

## 2020-10-28 PROCEDURE — 99999 PR PBB SHADOW E&M-EST. PATIENT-LVL III: ICD-10-PCS | Mod: PBBFAC,,, | Performed by: FAMILY MEDICINE

## 2020-10-28 PROCEDURE — 99214 PR OFFICE/OUTPT VISIT, EST, LEVL IV, 30-39 MIN: ICD-10-PCS | Mod: S$PBB,,, | Performed by: FAMILY MEDICINE

## 2020-10-28 PROCEDURE — 99214 OFFICE O/P EST MOD 30 MIN: CPT | Mod: S$PBB,,, | Performed by: FAMILY MEDICINE

## 2020-10-28 PROCEDURE — G0179 PR HOME HEALTH MD RECERTIFICATION: ICD-10-PCS | Mod: ,,, | Performed by: FAMILY MEDICINE

## 2020-10-28 RX ORDER — ATORVASTATIN CALCIUM 40 MG/1
40 TABLET, FILM COATED ORAL DAILY
Qty: 90 TABLET | Refills: 3 | Status: SHIPPED | OUTPATIENT
Start: 2020-10-28 | End: 2021-11-16 | Stop reason: SDUPTHER

## 2020-10-28 RX ORDER — BUTALBITAL, ACETAMINOPHEN AND CAFFEINE 50; 325; 40 MG/1; MG/1; MG/1
1 CAPSULE ORAL 3 TIMES DAILY PRN
Qty: 15 CAPSULE | Refills: 1 | Status: SHIPPED | OUTPATIENT
Start: 2020-10-28 | End: 2020-11-06 | Stop reason: SDUPTHER

## 2020-10-28 RX ORDER — DIAZEPAM 10 MG/1
10 TABLET ORAL 2 TIMES DAILY PRN
Qty: 60 TABLET | Refills: 0 | Status: SHIPPED | OUTPATIENT
Start: 2020-10-28 | End: 2020-11-06

## 2020-10-28 NOTE — PROGRESS NOTES
Subjective:       Patient ID: Susu Luther is a 70 y.o. female.    Chief Complaint: Follow-up    Pt is a 70 year old here for follow-up chronic conditions and still experiencing some right upper quad pain. Pt had CT scan of upper thoracic and showed porcelain gallbladder. Pt BP is well controlled today. She is stable taking valium which she has been on for years.     Review of Systems   Constitutional: Negative.    Respiratory: Negative.    Cardiovascular: Negative.    Gastrointestinal: Negative.    Genitourinary: Negative.    Musculoskeletal: Negative.    Hematological: Negative.    Psychiatric/Behavioral: Negative.          Objective:      Physical Exam  Constitutional:       Appearance: Normal appearance.   Neck:      Musculoskeletal: Normal range of motion and neck supple.   Cardiovascular:      Rate and Rhythm: Normal rate and regular rhythm.      Pulses: Normal pulses.      Heart sounds: Normal heart sounds.   Abdominal:      General: Abdomen is flat.      Palpations: Abdomen is soft.   Neurological:      General: No focal deficit present.      Mental Status: She is alert and oriented to person, place, and time.         Assessment:       1. Porcelain gallbladder    2. Essential hypertension    3. Insomnia, unspecified type    4. Malignant neoplasm of upper-outer quadrant of right breast in female, estrogen receptor positive    5. Anxiety        Plan:       Porcelain gallbladder  Comments:  Will discuss with General surgery    Essential hypertension  Comments:  BP is stable    Insomnia, unspecified type  Comments:  Pt is on valium. Not think it is good idea to add restoril. Psychiatry consult if she would like    Malignant neoplasm of upper-outer quadrant of right breast in female, estrogen receptor positive  Comments:  Pt continues to be followed  Orders:  -     butalbital-acetaminophen-caff -40 mg -40 mg Cap; Take 1 capsule by mouth 3 (three) times daily as needed.  Dispense: 15 capsule;  Refill: 1    Anxiety  Comments:  Pt is on zoloft and valium   Orders:  -     diazePAM (VALIUM) 10 MG Tab; Take 1 tablet (10 mg total) by mouth 2 (two) times daily as needed.  Dispense: 60 tablet; Refill: 0    Other orders  -     atorvastatin (LIPITOR) 40 MG tablet; Take 1 tablet (40 mg total) by mouth once daily.  Dispense: 90 tablet; Refill: 3

## 2020-10-28 NOTE — Clinical Note
Pt has had increase in alkaline phosphatase. Pt on CT of thoracic showed a porcelain gallbladder, no stones. No upper right quad. You did the biopsy on her breast cancer just wanted to see best just to observe...No liver function problems.

## 2020-10-29 ENCOUNTER — PATIENT MESSAGE (OUTPATIENT)
Dept: INTERNAL MEDICINE | Facility: CLINIC | Age: 70
End: 2020-10-29

## 2020-10-29 ENCOUNTER — TELEPHONE (OUTPATIENT)
Dept: INTERNAL MEDICINE | Facility: CLINIC | Age: 70
End: 2020-10-29

## 2020-10-29 NOTE — TELEPHONE ENCOUNTER
I s/w pt informing her that Dr. Rojas stated that Dr. Starks would like to follow up with her. I scheduled pt for November 9th. //BJ

## 2020-10-29 NOTE — TELEPHONE ENCOUNTER
Left vm for pt to return call to clinic in regards to scheduling an appointment with Dr. Starks. //GLENN

## 2020-10-29 NOTE — TELEPHONE ENCOUNTER
----- Message from Kennedy Rojas MD sent at 10/28/2020 12:55 PM CDT -----  Inform pt that Dr. Amaro would like to see her. Can we help make that appt.  ----- Message -----  From: Artemio Starks MD  Sent: 10/28/2020  10:51 AM CDT  To: Kennedy Rojas MD    A porcelain gallbladder gives her about a 2-3% chance of having a gallbladder cancer.  I would be happy to see this to discuss it with her.    Please feel free to make her an appointment with our office    ----- Message -----  From: Kennedy Rojas MD  Sent: 10/28/2020  10:41 AM CDT  To: Artemio Starks MD    Pt has had increase in alkaline phosphatase. Pt on CT of thoracic showed a porcelain gallbladder, no stones. No upper right quad. You did the biopsy on her breast cancer just wanted to see best just to observe...No liver function problems.

## 2020-10-30 ENCOUNTER — PATIENT MESSAGE (OUTPATIENT)
Dept: ADMINISTRATIVE | Facility: HOSPITAL | Age: 70
End: 2020-10-30

## 2020-10-31 ENCOUNTER — PATIENT MESSAGE (OUTPATIENT)
Dept: HEMATOLOGY/ONCOLOGY | Facility: CLINIC | Age: 70
End: 2020-10-31

## 2020-11-02 ENCOUNTER — TELEPHONE (OUTPATIENT)
Dept: RADIOLOGY | Facility: HOSPITAL | Age: 70
End: 2020-11-02

## 2020-11-02 ENCOUNTER — PATIENT MESSAGE (OUTPATIENT)
Dept: HEMATOLOGY/ONCOLOGY | Facility: CLINIC | Age: 70
End: 2020-11-02

## 2020-11-04 ENCOUNTER — HOSPITAL ENCOUNTER (OUTPATIENT)
Dept: RADIOLOGY | Facility: HOSPITAL | Age: 70
Discharge: HOME | End: 2020-11-04
Attending: INTERNAL MEDICINE
Payer: MEDICARE

## 2020-11-04 DIAGNOSIS — Z17.0 MALIGNANT NEOPLASM OF UPPER-OUTER QUADRANT OF RIGHT BREAST IN FEMALE, ESTROGEN RECEPTOR POSITIVE: ICD-10-CM

## 2020-11-04 DIAGNOSIS — C50.411 MALIGNANT NEOPLASM OF UPPER-OUTER QUADRANT OF RIGHT BREAST IN FEMALE, ESTROGEN RECEPTOR POSITIVE: ICD-10-CM

## 2020-11-04 PROCEDURE — 78815 NM PET CT ROUTINE: ICD-10-PCS | Mod: 26,PI,, | Performed by: RADIOLOGY

## 2020-11-04 PROCEDURE — 78815 PET IMAGE W/CT SKULL-THIGH: CPT | Mod: TC,PI

## 2020-11-04 PROCEDURE — 78815 PET IMAGE W/CT SKULL-THIGH: CPT | Mod: 26,PI,, | Performed by: RADIOLOGY

## 2020-11-04 PROCEDURE — A9552 F18 FDG: HCPCS

## 2020-11-04 NOTE — H&P (VIEW-ONLY)
Subjective:       Patient ID: Susu Luther is a 70 y.o. female.    Chief Complaint: Malignant neoplasm of upper-outer quadrant of right breast in female, estrogen receptor positive [C50.411, Z17.0]  HPI: We have an opportunity to see Ms. Susu Luther in Hematology Oncology clinic at [facility] on 11/04/2020.  Ms. Susu Luther is a 70 y.o. woman with pT2N0 invasive lobular breast cancer ER positive GA negative Her2 negative s/p lumpectomy and sentinel node biopsy.       Was found right breast mass on self breast exam.  Screening mammogram on 2/26/2019 showed Impression:  Right  Mass: Right breast mass at the lower outer middle position. Assessment: 0 - Incomplete. Special Views: Spot Compression View and Ultrasound are recommended.      Left  There is no mammographic evidence of malignancy.     On March 4, 2019, underwent US guided biopsy showed FINAL PATHOLOGIC DIAGNOSIS  1. BREAST, RIGHT, LOWER OUTER 08:00, ULTRASOUND-GUIDED BIOPSY:  Invasive lobular carcinoma of breast.  Size of invasive carcinoma: 19 mm in greatest linear dimension within a single core biopsy fragment.  [place] Histologic Score: Grade 2 of 3.  Tubule formation: 3  Nuclear pleomorphism: 2  Mitoses: 1  Associated lobular carcinoma in situ (LCIS) is present.  No lymphovascular or perineural invasion.  Breast biomarkers are pending and will be included in the supplemental report.  2. AXILLA, RIGHT LYMPH NODES, ULTRASOUND-GUIDED BIOPSY:  Benign lymph node without evidence of metastatic carcinoma.  Immunohistochemical stains (Cam 5.2 and CK7) are confirmatory.     On March 27, 2019 underwent lumpectomy with sentinel node.  Pathology showed   PROCEDURE: Excision/lumpectomy  SPECIMEN LATERALITY: Right breast  TUMOR SITE: 8 o'clock  TUMOR SIZE: Approximately 4.0 x 3.0 x 3.0 cm  HISTOLOGIC TYPE: Invasive lobular carcinoma  HISTOLOGIC GRADE: Grade 2 of 3  Tubular differentiation: 3  Nuclear pleomorphism: 2  Mitotic rate:  1  TUMOR FOCALITY: Single focus of invasive carcinoma  DUCTAL CARCINOMA IN SITU: Not identified  LOBULAR CARCINOMA IN SITU: Present  MARGINS:  Main specimen (#6) : Carcinoma present at superior, deep and medial margins  Distance from inferior margin: 5 mm  Distance from lateral margin: greater than 20 mm  Distance of anterior margin: 15 mm  Additionally submitted margins (specimen #7): Distance from newly designated margin: 4 mm  REGIONAL LYMPH NODES: Uninvolved tumor cells  Number of total lymph nodes examined: 8  Number of sentinel nodes examined: 4  TREATMENT EFFECT: No known presurgical therapy  LYMPH-VASCULAR INVASION: Not identified  ANCILLARY STUDIES (performed on patient's previous biopsy MS ):  ER: Positive (95% of tumor cells)  AR: Negative (less than 1 % of tumor cells)  Her2 by FISH: Negative (not amplified)  Ki-67%: Positive (70 % of tumor cells)  ADDITIONAL FINDINGS: Apocrine metaplasia, usual ductal hyperplasia, fibroadenomatoid change  PATHOLOGIC STAGE CLASSIFICATION: pT2 pN0     Oncotype type DX recurrence score 22, with distant recurrence risk 8%, absolute chemotherapy benefit <1%.     Completed adjuvant radiation.  Has right breast tenderness.  Started on anastrozole.  Has arthralgias. Could not tolerate.  AI was changed to letrozole, tolerating well.     Reports ran out of her antihypertensive medications, has had high blood pressure at home and headaches.    Oncology History   Malignant neoplasm of upper-outer quadrant of right breast in female, estrogen receptor positive   3/14/2019 Initial Diagnosis    Malignant neoplasm of upper-outer quadrant of right breast in female, estrogen receptor positive     3/27/2019 Cancer Staged    Staging form: Breast, AJCC 8th Edition  - Pathologic stage from 3/27/2019: Stage IIA (pT2, pN0(sn), cM0, G2, ER+, AR-, HER2-) - Signed by Guido Huynh MD on 4/4/2019 5/20/2019 - 6/18/2019 Radiation Therapy    Treatment Site Ref. ID Energy Dose/Fx (Gy) #Fx  Dose Correction (Gy) Total Dose (Gy) Start Date End Date Elapsed Days   Boost Boost 18X 2.5 4 / 4 0 10 6/13/2019 6/18/2019 5   RtBreastAddMV Rt Breast 18X/6X 2.66 16 / 16 0 42.56 5/20/2019 6/12/2019 23            Past Medical History:   Diagnosis Date    Chronic diastolic congestive heart failure 8/25/2020    Encounter for blood transfusion     Hx of psychiatric care     Hypertension     Major depressive disorder, single episode, moderate with anxious distress 1/14/2020    Malignant neoplasm of upper-outer quadrant of right breast in female, estrogen receptor positive 3/14/2019    radiation    Psychiatric problem     Sleep difficulties     Stroke 2009    no residual defect    Therapy      Family History   Problem Relation Age of Onset    Diabetes Maternal Grandmother     Diabetes Maternal Grandfather     Diabetes Mother     Breast cancer Neg Hx     Colon cancer Neg Hx     Ovarian cancer Neg Hx      Social History     Socioeconomic History    Marital status:      Spouse name: Campos Luther    Number of children: 2    Years of education: Not on file    Highest education level: Not on file   Occupational History    Not on file   Social Needs    Financial resource strain: Not on file    Food insecurity     Worry: Not on file     Inability: Not on file    Transportation needs     Medical: Not on file     Non-medical: Not on file   Tobacco Use    Smoking status: Never Smoker    Smokeless tobacco: Never Used   Substance and Sexual Activity    Alcohol use: No    Drug use: No    Sexual activity: Yes     Partners: Male     Birth control/protection: Post-menopausal   Lifestyle    Physical activity     Days per week: Not on file     Minutes per session: Not on file    Stress: Not on file   Relationships    Social connections     Talks on phone: Not on file     Gets together: Not on file     Attends [employer] service: Not on file     Active member of club or organization: Not on file      Attends meetings of clubs or organizations: Not on file     Relationship status: Not on file   Other Topics Concern    Patient feels they ought to cut down on drinking/drug use Not Asked    Patient annoyed by others criticizing their drinking/drug use Not Asked    Patient has felt bad or guilty about drinking/drug use Not Asked    Patient has had a drink/used drugs as an eye opener in the AM Not Asked   Social History Narrative    Not on file     Past Surgical History:   Procedure Laterality Date    APPENDECTOMY      BREAST BIOPSY      2019    BREAST LUMPECTOMY      2019     SECTION      x 1    OOPHORECTOMY      right     SENTINEL LYMPH NODE BIOPSY Right 3/27/2019    Procedure: BIOPSY, LYMPH NODE, SENTINEL;  Surgeon: Artemio Starks MD;  Location: [facility];  Service: General;  Laterality: Right;     Current Outpatient Medications   Medication Sig Dispense Refill    albuterol (VENTOLIN HFA) 90 mcg/actuation inhaler Inhale 2 puffs into the lungs every 4 (four) hours as needed for Shortness of Breath. 18 g 11    amLODIPine (NORVASC) 10 MG tablet Take 1 tablet (10 mg total) by mouth once daily. 90 tablet 2    aspirin (ECOTRIN) 81 MG EC tablet Take 1 tablet (81 mg total) by mouth once daily. (Patient taking differently: Take 81 mg by mouth every other day. )  0    atorvastatin (LIPITOR) 40 MG tablet Take 1 tablet (40 mg total) by mouth once daily. 90 tablet 3    bismuth subsalicylate (PEPTO BISMOL) 262 mg/15 mL suspension Take 15 mLs by mouth every 6 (six) hours as needed for Indigestion.      blood pressure kit med and lrg Kit Take blood pressure first thing in the morning. (Patient not taking: Reported on 10/28/2020) 1 each 0    blood pressure kit med and lrg Kit Check blood pressure every morning 1 each 0    butalbital-acetaminophen-caff -40 mg -40 mg Cap Take 1 capsule by mouth 3 (three) times daily as needed. 15 capsule 1    cholecalciferol, vitamin D3, 50,000 unit capsule  Take 50,000 Units by mouth once a week.   0    cloNIDine (CATAPRES) 0.2 MG tablet Take 1 tablet (0.2 mg total) by mouth 3 (three) times daily. 270 tablet 2    diazePAM (VALIUM) 10 MG Tab Take 1 tablet (10 mg total) by mouth 2 (two) times daily as needed. 60 tablet 0    emollient combination no.63 (MIADERM) Lotn Apply 1 application topically 3 (three) times daily. (Patient not taking: Reported on 10/28/2020)      erythromycin (ROMYCIN) ophthalmic ointment APPLY TO EYELASHES AT BEDTIME      evolocumab (REPATHA SURECLICK) 140 mg/mL PnIj Inject 1 mL (140 mg total) into the skin every 14 (fourteen) days. 2 Syringe 5    furosemide (LASIX) 20 MG tablet take 1/2 tablet by mouth a day. 90 tablet 0    gabapentin (NEURONTIN) 600 MG tablet       hydrALAZINE (APRESOLINE) 25 MG tablet Take 1 tablet (25 mg total) by mouth every 8 (eight) hours. 90 tablet 1    hydroCHLOROthiazide (HYDRODIURIL) 25 MG tablet Take 1 tablet (25 mg total) by mouth once daily. 90 tablet 3    inhalation spacing device (COMPACT SPACE CHAMBER) Use as directed for inhalation. 1 Device 0    letrozole (FEMARA) 2.5 mg Tab Take 1 tablet (2.5 mg total) by mouth once daily. 30 tablet 11    lidocaine (LIDODERM) 5 % Place 1 patch onto the skin daily as needed. Remove & Discard patch within 12 hours or as directed by MD 30 patch 0    lidocaine HCL-menthoL 4-1 % PtMd Apply 1 patch topically daily as needed. 30 patch 6    lidocaine-prilocaine (EMLA) cream       losartan (COZAAR) 100 MG tablet Take 1 tablet by mouth once daily 90 tablet 1    meclizine (ANTIVERT) 12.5 mg tablet Take 1 tablet by mouth three times daily as needed 90 tablet 0    multivitamin (ONE DAILY MULTIVITAMIN) per tablet Take 1 tablet by mouth once daily.      NARCAN 4 mg/actuation [place] CALL 911. ADMINISTER A SINGLE SPRAY INTRANASALLY INTO ONE NOSTRIL UPON SIGNS OF OPIOID OVERDOSE. MAY REPEAT AFTER 3 MINUTES IF NO RESPONSE.  0    prednisoLONE acetate (PRED FORTE) 1 % DrpS INSTILL 1  DROP INTO EACH EYE THREE TIMES DAILY      propranolol (INDERAL) 40 MG tablet Take 1 tablet (40 mg total) by mouth 2 (two) times daily. 60 tablet 11    sertraline (ZOLOFT) 50 MG tablet       traMADoL (ULTRAM) 50 mg tablet Take 1 tablet (50 mg total) by mouth every 6 (six) hours as needed. 90 tablet 0    venlafaxine (EFFEXOR-XR) 75 MG 24 hr capsule Take 1 capsule (75 mg total) by mouth once daily. 90 capsule 1     No current facility-administered medications for this visit.        Labs:  Lab Results   Component Value Date    WBC 9.20 10/19/2020    HGB 12.0 10/19/2020    HCT 39.8 10/19/2020    MCV 83 10/19/2020     10/19/2020     BMP  Lab Results   Component Value Date     10/19/2020    K 4.3 10/19/2020     10/19/2020    CO2 26 10/19/2020    BUN 14 10/19/2020    CREATININE 1.2 10/19/2020    CALCIUM 9.9 10/19/2020    ANIONGAP 8 10/19/2020    ESTGFRAFRICA 54 (A) 10/19/2020    EGFRNONAA 47 (A) 10/19/2020     Lab Results   Component Value Date    ALT 24 10/19/2020    AST 21 10/19/2020    ALKPHOS 179 (H) 10/19/2020    BILITOT 0.3 10/19/2020       Lab Results   Component Value Date    IRON 69 12/16/2019    TIBC 302 12/16/2019    FERRITIN 56 12/16/2019     Lab Results   Component Value Date    IHJAUWGP58 994 (H) 12/16/2019     Lab Results   Component Value Date    FOLATE 3.9 (L) 12/16/2019     Lab Results   Component Value Date    TSH 1.170 12/16/2019       I have reviewed the radiology reports and examined the scan/xray images.    Review of Systems   Constitutional: Negative.    HENT: Negative.    Eyes: Negative.    Respiratory: Negative.    Cardiovascular: Negative.    Gastrointestinal: Negative.    Endocrine: Negative.    Genitourinary: Negative.    Musculoskeletal: Negative.    Skin: Negative.    Allergic/Immunologic: Negative.    Neurological: Negative.    Hematological: Negative.    Psychiatric/Behavioral: Negative.      ECOG SCORE    0 - Fully active-able to carry on all pre-disease performance  without restriction       PET CT  EXAMINATION:  NM PET CT ROUTINE     CLINICAL HISTORY:  restage breast cancer;  Malignant neoplasm of upper-outer quadrant of right female breast     TECHNIQUE:  Segmented attenuation corrected 3-D PET imaging was obtained from the skull base through the mid thighs utilizing 13.45 mCi F-18-FDG.  Noncontrast CT imaging was performed for attenuation correction, diagnosis, and anatomical fusion with PET.     COMPARISON:  None.     FINDINGS:  Head/neck: There is normal physiologic FDG uptake noted within the visualized brain parenchyma. No FDG avid lymphadenopathy within the neck.Physiologic FDG uptake is noted within the strap muscles of the neck bilaterally.  A focal area of uptake and slight nodular thickening is noted within the posterior musculature on the left with SUV max of 5.2 (CT series 3, image 38).     Chest: There are post treatment changes from previous surgery and radiation within the right breast.  A large hypermetabolic lymph node mass is identified in the right subpectoral space measuring 6.9 x 3.2 cm with SUV max of 13.0. No FDG avid pulmonary nodules/masses.Physiologic FDG uptake within the erector spinae musculature.     Abdomen/Pelvis: Normal physiologic FDG uptake noted within the liver, spleen, urinary tract, and bowel.No FDG avid retroperitoneal lymph nodes.  Physiologic FDG uptake within the erector spine a and gluteal musculature.     Skeletal: No FDG avid osseus lesions identified.     Impression:     1. Post treatment changes of lumpectomy and radiation right breast.  2. Large hypermetabolic right subpectoral lymph node mass.  3. Physiologic uptake within the strap musculature of the of the neck bilaterally.  Focal uptake and thickening within posterior musculature on the left favored to be physiologic though metastasis not entirely excluded.     Objective:     Vitals:    11/05/20 1008   BP: (!) 147/96   Pulse: 92   Temp: 98.7 °F (37.1 °C)   Body mass index  is 43.56 kg/m².  Physical Exam  Vitals signs and nursing note reviewed.   Constitutional:       Appearance: She is well-developed.   HENT:      Head: Normocephalic and atraumatic.   Eyes:      Conjunctiva/sclera: Conjunctivae normal.   Neck:      Musculoskeletal: Normal range of motion and neck supple.   Cardiovascular:      Rate and Rhythm: Normal rate and regular rhythm.   Pulmonary:      Effort: Pulmonary effort is normal.      Breath sounds: Normal breath sounds.   Abdominal:      General: Bowel sounds are normal.      Palpations: Abdomen is soft.   Musculoskeletal: Normal range of motion.   Skin:     General: Skin is warm and dry.   Neurological:      Mental Status: She is alert and oriented to person, place, and time.   Psychiatric:         Behavior: Behavior normal.         Thought Content: Thought content normal.         Judgment: Judgment normal.           Assessment:      1. Malignant neoplasm of upper-outer quadrant of right breast in female, estrogen receptor positive           Plan:     Malignant neoplasm of upper-outer quadrant of right breast in female, estrogen receptor positive    Will set up to see IR for biopsy of right upper chest wall mass see on CT ? Cancer versus infection.

## 2020-11-04 NOTE — PROGRESS NOTES
Subjective:       Patient ID: Susu Luther is a 70 y.o. female.    Chief Complaint: Malignant neoplasm of upper-outer quadrant of right breast in female, estrogen receptor positive [C50.411, Z17.0]  HPI: We have an opportunity to see Ms. Susu Luther in Hematology Oncology clinic at [facility] on 11/04/2020.  Ms. Susu Luther is a 70 y.o. woman with pT2N0 invasive lobular breast cancer ER positive TN negative Her2 negative s/p lumpectomy and sentinel node biopsy.       Was found right breast mass on self breast exam.  Screening mammogram on 2/26/2019 showed Impression:  Right  Mass: Right breast mass at the lower outer middle position. Assessment: 0 - Incomplete. Special Views: Spot Compression View and Ultrasound are recommended.      Left  There is no mammographic evidence of malignancy.     On March 4, 2019, underwent US guided biopsy showed FINAL PATHOLOGIC DIAGNOSIS  1. BREAST, RIGHT, LOWER OUTER 08:00, ULTRASOUND-GUIDED BIOPSY:  Invasive lobular carcinoma of breast.  Size of invasive carcinoma: 19 mm in greatest linear dimension within a single core biopsy fragment.  [place] Histologic Score: Grade 2 of 3.  Tubule formation: 3  Nuclear pleomorphism: 2  Mitoses: 1  Associated lobular carcinoma in situ (LCIS) is present.  No lymphovascular or perineural invasion.  Breast biomarkers are pending and will be included in the supplemental report.  2. AXILLA, RIGHT LYMPH NODES, ULTRASOUND-GUIDED BIOPSY:  Benign lymph node without evidence of metastatic carcinoma.  Immunohistochemical stains (Cam 5.2 and CK7) are confirmatory.     On March 27, 2019 underwent lumpectomy with sentinel node.  Pathology showed   PROCEDURE: Excision/lumpectomy  SPECIMEN LATERALITY: Right breast  TUMOR SITE: 8 o'clock  TUMOR SIZE: Approximately 4.0 x 3.0 x 3.0 cm  HISTOLOGIC TYPE: Invasive lobular carcinoma  HISTOLOGIC GRADE: Grade 2 of 3  Tubular differentiation: 3  Nuclear pleomorphism: 2  Mitotic rate:  1  TUMOR FOCALITY: Single focus of invasive carcinoma  DUCTAL CARCINOMA IN SITU: Not identified  LOBULAR CARCINOMA IN SITU: Present  MARGINS:  Main specimen (#6) : Carcinoma present at superior, deep and medial margins  Distance from inferior margin: 5 mm  Distance from lateral margin: greater than 20 mm  Distance of anterior margin: 15 mm  Additionally submitted margins (specimen #7): Distance from newly designated margin: 4 mm  REGIONAL LYMPH NODES: Uninvolved tumor cells  Number of total lymph nodes examined: 8  Number of sentinel nodes examined: 4  TREATMENT EFFECT: No known presurgical therapy  LYMPH-VASCULAR INVASION: Not identified  ANCILLARY STUDIES (performed on patient's previous biopsy MS ):  ER: Positive (95% of tumor cells)  HI: Negative (less than 1 % of tumor cells)  Her2 by FISH: Negative (not amplified)  Ki-67%: Positive (70 % of tumor cells)  ADDITIONAL FINDINGS: Apocrine metaplasia, usual ductal hyperplasia, fibroadenomatoid change  PATHOLOGIC STAGE CLASSIFICATION: pT2 pN0     Oncotype type DX recurrence score 22, with distant recurrence risk 8%, absolute chemotherapy benefit <1%.     Completed adjuvant radiation.  Has right breast tenderness.  Started on anastrozole.  Has arthralgias. Could not tolerate.  AI was changed to letrozole, tolerating well.     Reports ran out of her antihypertensive medications, has had high blood pressure at home and headaches.    Oncology History   Malignant neoplasm of upper-outer quadrant of right breast in female, estrogen receptor positive   3/14/2019 Initial Diagnosis    Malignant neoplasm of upper-outer quadrant of right breast in female, estrogen receptor positive     3/27/2019 Cancer Staged    Staging form: Breast, AJCC 8th Edition  - Pathologic stage from 3/27/2019: Stage IIA (pT2, pN0(sn), cM0, G2, ER+, HI-, HER2-) - Signed by Guido Huynh MD on 4/4/2019 5/20/2019 - 6/18/2019 Radiation Therapy    Treatment Site Ref. ID Energy Dose/Fx (Gy) #Fx  Dose Correction (Gy) Total Dose (Gy) Start Date End Date Elapsed Days   Boost Boost 18X 2.5 4 / 4 0 10 6/13/2019 6/18/2019 5   RtBreastAddMV Rt Breast 18X/6X 2.66 16 / 16 0 42.56 5/20/2019 6/12/2019 23            Past Medical History:   Diagnosis Date    Chronic diastolic congestive heart failure 8/25/2020    Encounter for blood transfusion     Hx of psychiatric care     Hypertension     Major depressive disorder, single episode, moderate with anxious distress 1/14/2020    Malignant neoplasm of upper-outer quadrant of right breast in female, estrogen receptor positive 3/14/2019    radiation    Psychiatric problem     Sleep difficulties     Stroke 2009    no residual defect    Therapy      Family History   Problem Relation Age of Onset    Diabetes Maternal Grandmother     Diabetes Maternal Grandfather     Diabetes Mother     Breast cancer Neg Hx     Colon cancer Neg Hx     Ovarian cancer Neg Hx      Social History     Socioeconomic History    Marital status:      Spouse name: Campos Luther    Number of children: 2    Years of education: Not on file    Highest education level: Not on file   Occupational History    Not on file   Social Needs    Financial resource strain: Not on file    Food insecurity     Worry: Not on file     Inability: Not on file    Transportation needs     Medical: Not on file     Non-medical: Not on file   Tobacco Use    Smoking status: Never Smoker    Smokeless tobacco: Never Used   Substance and Sexual Activity    Alcohol use: No    Drug use: No    Sexual activity: Yes     Partners: Male     Birth control/protection: Post-menopausal   Lifestyle    Physical activity     Days per week: Not on file     Minutes per session: Not on file    Stress: Not on file   Relationships    Social connections     Talks on phone: Not on file     Gets together: Not on file     Attends Jewish service: Not on file     Active member of club or organization: Not on file      Attends meetings of clubs or organizations: Not on file     Relationship status: Not on file   Other Topics Concern    Patient feels they ought to cut down on drinking/drug use Not Asked    Patient annoyed by others criticizing their drinking/drug use Not Asked    Patient has felt bad or guilty about drinking/drug use Not Asked    Patient has had a drink/used drugs as an eye opener in the AM Not Asked   Social History Narrative    Not on file     Past Surgical History:   Procedure Laterality Date    APPENDECTOMY      BREAST BIOPSY      2019    BREAST LUMPECTOMY      2019     SECTION      x 1    OOPHORECTOMY      right     SENTINEL LYMPH NODE BIOPSY Right 3/27/2019    Procedure: BIOPSY, LYMPH NODE, SENTINEL;  Surgeon: Artemio Starks MD;  Location: [facility];  Service: General;  Laterality: Right;     Current Outpatient Medications   Medication Sig Dispense Refill    albuterol (VENTOLIN HFA) 90 mcg/actuation inhaler Inhale 2 puffs into the lungs every 4 (four) hours as needed for Shortness of Breath. 18 g 11    amLODIPine (NORVASC) 10 MG tablet Take 1 tablet (10 mg total) by mouth once daily. 90 tablet 2    aspirin (ECOTRIN) 81 MG EC tablet Take 1 tablet (81 mg total) by mouth once daily. (Patient taking differently: Take 81 mg by mouth every other day. )  0    atorvastatin (LIPITOR) 40 MG tablet Take 1 tablet (40 mg total) by mouth once daily. 90 tablet 3    bismuth subsalicylate (PEPTO BISMOL) 262 mg/15 mL suspension Take 15 mLs by mouth every 6 (six) hours as needed for Indigestion.      blood pressure kit med and lrg Kit Take blood pressure first thing in the morning. (Patient not taking: Reported on 10/28/2020) 1 each 0    blood pressure kit med and lrg Kit Check blood pressure every morning 1 each 0    butalbital-acetaminophen-caff -40 mg -40 mg Cap Take 1 capsule by mouth 3 (three) times daily as needed. 15 capsule 1    cholecalciferol, vitamin D3, 50,000 unit capsule  Take 50,000 Units by mouth once a week.   0    cloNIDine (CATAPRES) 0.2 MG tablet Take 1 tablet (0.2 mg total) by mouth 3 (three) times daily. 270 tablet 2    diazePAM (VALIUM) 10 MG Tab Take 1 tablet (10 mg total) by mouth 2 (two) times daily as needed. 60 tablet 0    emollient combination no.63 (MIADERM) Lotn Apply 1 application topically 3 (three) times daily. (Patient not taking: Reported on 10/28/2020)      erythromycin (ROMYCIN) ophthalmic ointment APPLY TO EYELASHES AT BEDTIME      evolocumab (REPATHA SURECLICK) 140 mg/mL PnIj Inject 1 mL (140 mg total) into the skin every 14 (fourteen) days. 2 Syringe 5    furosemide (LASIX) 20 MG tablet take 1/2 tablet by mouth a day. 90 tablet 0    gabapentin (NEURONTIN) 600 MG tablet       hydrALAZINE (APRESOLINE) 25 MG tablet Take 1 tablet (25 mg total) by mouth every 8 (eight) hours. 90 tablet 1    hydroCHLOROthiazide (HYDRODIURIL) 25 MG tablet Take 1 tablet (25 mg total) by mouth once daily. 90 tablet 3    inhalation spacing device (COMPACT SPACE CHAMBER) Use as directed for inhalation. 1 Device 0    letrozole (FEMARA) 2.5 mg Tab Take 1 tablet (2.5 mg total) by mouth once daily. 30 tablet 11    lidocaine (LIDODERM) 5 % Place 1 patch onto the skin daily as needed. Remove & Discard patch within 12 hours or as directed by MD 30 patch 0    lidocaine HCL-menthoL 4-1 % PtMd Apply 1 patch topically daily as needed. 30 patch 6    lidocaine-prilocaine (EMLA) cream       losartan (COZAAR) 100 MG tablet Take 1 tablet by mouth once daily 90 tablet 1    meclizine (ANTIVERT) 12.5 mg tablet Take 1 tablet by mouth three times daily as needed 90 tablet 0    multivitamin (ONE DAILY MULTIVITAMIN) per tablet Take 1 tablet by mouth once daily.      NARCAN 4 mg/actuation [place] CALL 911. ADMINISTER A SINGLE SPRAY INTRANASALLY INTO ONE NOSTRIL UPON SIGNS OF OPIOID OVERDOSE. MAY REPEAT AFTER 3 MINUTES IF NO RESPONSE.  0    prednisoLONE acetate (PRED FORTE) 1 % DrpS INSTILL 1  DROP INTO EACH EYE THREE TIMES DAILY      propranolol (INDERAL) 40 MG tablet Take 1 tablet (40 mg total) by mouth 2 (two) times daily. 60 tablet 11    sertraline (ZOLOFT) 50 MG tablet       traMADoL (ULTRAM) 50 mg tablet Take 1 tablet (50 mg total) by mouth every 6 (six) hours as needed. 90 tablet 0    venlafaxine (EFFEXOR-XR) 75 MG 24 hr capsule Take 1 capsule (75 mg total) by mouth once daily. 90 capsule 1     No current facility-administered medications for this visit.        Labs:  Lab Results   Component Value Date    WBC 9.20 10/19/2020    HGB 12.0 10/19/2020    HCT 39.8 10/19/2020    MCV 83 10/19/2020     10/19/2020     BMP  Lab Results   Component Value Date     10/19/2020    K 4.3 10/19/2020     10/19/2020    CO2 26 10/19/2020    BUN 14 10/19/2020    CREATININE 1.2 10/19/2020    CALCIUM 9.9 10/19/2020    ANIONGAP 8 10/19/2020    ESTGFRAFRICA 54 (A) 10/19/2020    EGFRNONAA 47 (A) 10/19/2020     Lab Results   Component Value Date    ALT 24 10/19/2020    AST 21 10/19/2020    ALKPHOS 179 (H) 10/19/2020    BILITOT 0.3 10/19/2020       Lab Results   Component Value Date    IRON 69 12/16/2019    TIBC 302 12/16/2019    FERRITIN 56 12/16/2019     Lab Results   Component Value Date    KTTRGMQV40 994 (H) 12/16/2019     Lab Results   Component Value Date    FOLATE 3.9 (L) 12/16/2019     Lab Results   Component Value Date    TSH 1.170 12/16/2019       I have reviewed the radiology reports and examined the scan/xray images.    Review of Systems   Constitutional: Negative.    HENT: Negative.    Eyes: Negative.    Respiratory: Negative.    Cardiovascular: Negative.    Gastrointestinal: Negative.    Endocrine: Negative.    Genitourinary: Negative.    Musculoskeletal: Negative.    Skin: Negative.    Allergic/Immunologic: Negative.    Neurological: Negative.    Hematological: Negative.    Psychiatric/Behavioral: Negative.      ECOG SCORE    0 - Fully active-able to carry on all pre-disease performance  without restriction       PET CT  EXAMINATION:  NM PET CT ROUTINE     CLINICAL HISTORY:  restage breast cancer;  Malignant neoplasm of upper-outer quadrant of right female breast     TECHNIQUE:  Segmented attenuation corrected 3-D PET imaging was obtained from the skull base through the mid thighs utilizing 13.45 mCi F-18-FDG.  Noncontrast CT imaging was performed for attenuation correction, diagnosis, and anatomical fusion with PET.     COMPARISON:  None.     FINDINGS:  Head/neck: There is normal physiologic FDG uptake noted within the visualized brain parenchyma. No FDG avid lymphadenopathy within the neck.Physiologic FDG uptake is noted within the strap muscles of the neck bilaterally.  A focal area of uptake and slight nodular thickening is noted within the posterior musculature on the left with SUV max of 5.2 (CT series 3, image 38).     Chest: There are post treatment changes from previous surgery and radiation within the right breast.  A large hypermetabolic lymph node mass is identified in the right subpectoral space measuring 6.9 x 3.2 cm with SUV max of 13.0. No FDG avid pulmonary nodules/masses.Physiologic FDG uptake within the erector spinae musculature.     Abdomen/Pelvis: Normal physiologic FDG uptake noted within the liver, spleen, urinary tract, and bowel.No FDG avid retroperitoneal lymph nodes.  Physiologic FDG uptake within the erector spine a and gluteal musculature.     Skeletal: No FDG avid osseus lesions identified.     Impression:     1. Post treatment changes of lumpectomy and radiation right breast.  2. Large hypermetabolic right subpectoral lymph node mass.  3. Physiologic uptake within the strap musculature of the of the neck bilaterally.  Focal uptake and thickening within posterior musculature on the left favored to be physiologic though metastasis not entirely excluded.     Objective:     Vitals:    11/05/20 1008   BP: (!) 147/96   Pulse: 92   Temp: 98.7 °F (37.1 °C)   Body mass index  is 43.56 kg/m².  Physical Exam  Vitals signs and nursing note reviewed.   Constitutional:       Appearance: She is well-developed.   HENT:      Head: Normocephalic and atraumatic.   Eyes:      Conjunctiva/sclera: Conjunctivae normal.   Neck:      Musculoskeletal: Normal range of motion and neck supple.   Cardiovascular:      Rate and Rhythm: Normal rate and regular rhythm.   Pulmonary:      Effort: Pulmonary effort is normal.      Breath sounds: Normal breath sounds.   Abdominal:      General: Bowel sounds are normal.      Palpations: Abdomen is soft.   Musculoskeletal: Normal range of motion.   Skin:     General: Skin is warm and dry.   Neurological:      Mental Status: She is alert and oriented to person, place, and time.   Psychiatric:         Behavior: Behavior normal.         Thought Content: Thought content normal.         Judgment: Judgment normal.           Assessment:      1. Malignant neoplasm of upper-outer quadrant of right breast in female, estrogen receptor positive           Plan:     Malignant neoplasm of upper-outer quadrant of right breast in female, estrogen receptor positive    Will set up to see IR for biopsy of right upper chest wall mass see on CT ? Cancer versus infection.

## 2020-11-05 ENCOUNTER — OFFICE VISIT (OUTPATIENT)
Dept: HEMATOLOGY/ONCOLOGY | Facility: CLINIC | Age: 70
End: 2020-11-05
Payer: MEDICARE

## 2020-11-05 ENCOUNTER — EXTERNAL HOME HEALTH (OUTPATIENT)
Dept: HOME HEALTH SERVICES | Facility: HOSPITAL | Age: 70
End: 2020-11-05
Payer: MEDICARE

## 2020-11-05 VITALS
OXYGEN SATURATION: 96 % | BODY MASS INDEX: 43.4 KG/M2 | TEMPERATURE: 99 F | SYSTOLIC BLOOD PRESSURE: 147 MMHG | HEIGHT: 69 IN | DIASTOLIC BLOOD PRESSURE: 96 MMHG | WEIGHT: 293 LBS | HEART RATE: 92 BPM

## 2020-11-05 DIAGNOSIS — R06.02 SHORTNESS OF BREATH ON EXERTION: ICD-10-CM

## 2020-11-05 DIAGNOSIS — C50.411 MALIGNANT NEOPLASM OF UPPER-OUTER QUADRANT OF RIGHT BREAST IN FEMALE, ESTROGEN RECEPTOR POSITIVE: Primary | Chronic | ICD-10-CM

## 2020-11-05 DIAGNOSIS — Z17.0 MALIGNANT NEOPLASM OF UPPER-OUTER QUADRANT OF RIGHT BREAST IN FEMALE, ESTROGEN RECEPTOR POSITIVE: Primary | Chronic | ICD-10-CM

## 2020-11-05 PROCEDURE — 99213 PR OFFICE/OUTPT VISIT, EST, LEVL III, 20-29 MIN: ICD-10-PCS | Mod: S$PBB,,, | Performed by: INTERNAL MEDICINE

## 2020-11-05 PROCEDURE — 99999 PR PBB SHADOW E&M-EST. PATIENT-LVL V: CPT | Mod: PBBFAC,,, | Performed by: INTERNAL MEDICINE

## 2020-11-05 PROCEDURE — 99215 OFFICE O/P EST HI 40 MIN: CPT | Mod: PBBFAC | Performed by: INTERNAL MEDICINE

## 2020-11-05 PROCEDURE — 99999 PR PBB SHADOW E&M-EST. PATIENT-LVL V: ICD-10-PCS | Mod: PBBFAC,,, | Performed by: INTERNAL MEDICINE

## 2020-11-05 PROCEDURE — 99213 OFFICE O/P EST LOW 20 MIN: CPT | Mod: S$PBB,,, | Performed by: INTERNAL MEDICINE

## 2020-11-05 RX ORDER — ASPIRIN 81 MG/1
81 TABLET ORAL DAILY
Qty: 90 TABLET | Refills: 0 | Status: SHIPPED | OUTPATIENT
Start: 2020-11-05 | End: 2022-02-15 | Stop reason: SDUPTHER

## 2020-11-05 RX ORDER — TRAMADOL HYDROCHLORIDE 50 MG/1
50 TABLET ORAL EVERY 6 HOURS PRN
Qty: 90 TABLET | Refills: 0 | Status: SHIPPED | OUTPATIENT
Start: 2020-11-05 | End: 2020-12-28 | Stop reason: SDUPTHER

## 2020-11-05 RX ORDER — GABAPENTIN 600 MG/1
600 TABLET ORAL 3 TIMES DAILY
Qty: 90 TABLET | Refills: 3 | Status: SHIPPED | OUTPATIENT
Start: 2020-11-05 | End: 2020-12-17 | Stop reason: SDUPTHER

## 2020-11-05 RX ORDER — ZOLPIDEM TARTRATE 5 MG/1
5 TABLET ORAL NIGHTLY PRN
Qty: 60 TABLET | Refills: 1 | Status: SHIPPED | OUTPATIENT
Start: 2020-11-05 | End: 2020-12-28 | Stop reason: SDUPTHER

## 2020-11-05 RX ORDER — ALBUTEROL SULFATE 90 UG/1
2 AEROSOL, METERED RESPIRATORY (INHALATION) EVERY 4 HOURS PRN
Qty: 18 G | Refills: 11 | Status: SHIPPED | OUTPATIENT
Start: 2020-11-05 | End: 2021-03-26 | Stop reason: SDUPTHER

## 2020-11-05 RX ORDER — SERTRALINE HYDROCHLORIDE 50 MG/1
50 TABLET, FILM COATED ORAL DAILY
Qty: 90 TABLET | Refills: 1 | Status: SHIPPED | OUTPATIENT
Start: 2020-11-05 | End: 2021-03-26

## 2020-11-05 RX ORDER — VENLAFAXINE HYDROCHLORIDE 75 MG/1
75 CAPSULE, EXTENDED RELEASE ORAL DAILY
Qty: 90 CAPSULE | Refills: 1 | Status: SHIPPED | OUTPATIENT
Start: 2020-11-05 | End: 2021-03-26

## 2020-11-06 ENCOUNTER — PATIENT OUTREACH (OUTPATIENT)
Dept: ADMINISTRATIVE | Facility: OTHER | Age: 70
End: 2020-11-06

## 2020-11-06 ENCOUNTER — PATIENT MESSAGE (OUTPATIENT)
Dept: HEMATOLOGY/ONCOLOGY | Facility: CLINIC | Age: 70
End: 2020-11-06

## 2020-11-06 DIAGNOSIS — Z17.0 MALIGNANT NEOPLASM OF UPPER-OUTER QUADRANT OF RIGHT BREAST IN FEMALE, ESTROGEN RECEPTOR POSITIVE: Primary | Chronic | ICD-10-CM

## 2020-11-06 DIAGNOSIS — C50.411 MALIGNANT NEOPLASM OF UPPER-OUTER QUADRANT OF RIGHT BREAST IN FEMALE, ESTROGEN RECEPTOR POSITIVE: Primary | Chronic | ICD-10-CM

## 2020-11-06 DIAGNOSIS — Z17.0 MALIGNANT NEOPLASM OF UPPER-OUTER QUADRANT OF RIGHT BREAST IN FEMALE, ESTROGEN RECEPTOR POSITIVE: Chronic | ICD-10-CM

## 2020-11-06 DIAGNOSIS — C50.411 MALIGNANT NEOPLASM OF UPPER-OUTER QUADRANT OF RIGHT BREAST IN FEMALE, ESTROGEN RECEPTOR POSITIVE: Chronic | ICD-10-CM

## 2020-11-06 RX ORDER — BUTALBITAL, ACETAMINOPHEN AND CAFFEINE 50; 325; 40 MG/1; MG/1; MG/1
1 CAPSULE ORAL 3 TIMES DAILY PRN
Qty: 15 CAPSULE | Refills: 1 | Status: SHIPPED | OUTPATIENT
Start: 2020-11-06 | End: 2020-11-19 | Stop reason: SDUPTHER

## 2020-11-06 RX ORDER — ALPRAZOLAM 0.5 MG/1
0.5 TABLET ORAL 3 TIMES DAILY PRN
Qty: 60 TABLET | Refills: 0 | Status: SHIPPED | OUTPATIENT
Start: 2020-11-06 | End: 2020-11-23 | Stop reason: SDUPTHER

## 2020-11-06 NOTE — PROGRESS NOTES
Health Maintenance Due   Topic Date Due    Colorectal Cancer Screening  1950    TETANUS VACCINE  01/02/1970    DEXA SCAN  01/02/1992    Shingles Vaccine (1 of 2) 01/02/2002    Pneumococcal Vaccine (65+ High/Highest Risk) (2 of 2 - PPSV23) 10/20/2020     Updates were requested from care everywhere.  Chart was reviewed for overdue Proactive [facility] Encounters (DOV) topics (CRS, Breast Cancer Screening, Eye exam)  Health Maintenance has been updated.  LINKS immunization registry triggered.  Immunizations were reconciled.

## 2020-11-08 ENCOUNTER — PATIENT MESSAGE (OUTPATIENT)
Dept: SURGERY | Facility: CLINIC | Age: 70
End: 2020-11-08

## 2020-11-09 ENCOUNTER — PATIENT MESSAGE (OUTPATIENT)
Dept: HEMATOLOGY/ONCOLOGY | Facility: CLINIC | Age: 70
End: 2020-11-09

## 2020-11-09 ENCOUNTER — TELEPHONE (OUTPATIENT)
Dept: SURGERY | Facility: CLINIC | Age: 70
End: 2020-11-09

## 2020-11-09 ENCOUNTER — PATIENT MESSAGE (OUTPATIENT)
Dept: SURGERY | Facility: CLINIC | Age: 70
End: 2020-11-09

## 2020-11-09 NOTE — TELEPHONE ENCOUNTER
----- Message from Gabriella Conte sent at 11/9/2020 12:16 PM CST -----  Please call pt @ [phone] regarding appts for today and tomorrow, pt need to know why she need to come both days.

## 2020-11-10 ENCOUNTER — HOSPITAL ENCOUNTER (OUTPATIENT)
Dept: RADIOLOGY | Facility: HOSPITAL | Age: 70
Discharge: HOME | End: 2020-11-10
Attending: INTERNAL MEDICINE
Payer: MEDICARE

## 2020-11-10 VITALS
HEART RATE: 60 BPM | OXYGEN SATURATION: 97 % | BODY MASS INDEX: 43.4 KG/M2 | DIASTOLIC BLOOD PRESSURE: 89 MMHG | HEIGHT: 69 IN | WEIGHT: 293 LBS | RESPIRATION RATE: 16 BRPM | SYSTOLIC BLOOD PRESSURE: 180 MMHG

## 2020-11-10 DIAGNOSIS — C50.411 MALIGNANT NEOPLASM OF UPPER-OUTER QUADRANT OF RIGHT BREAST IN FEMALE, ESTROGEN RECEPTOR POSITIVE: Primary | ICD-10-CM

## 2020-11-10 DIAGNOSIS — Z17.0 MALIGNANT NEOPLASM OF UPPER-OUTER QUADRANT OF RIGHT BREAST IN FEMALE, ESTROGEN RECEPTOR POSITIVE: Primary | ICD-10-CM

## 2020-11-10 DIAGNOSIS — C50.411 MALIGNANT NEOPLASM OF UPPER-OUTER QUADRANT OF RIGHT BREAST IN FEMALE, ESTROGEN RECEPTOR POSITIVE: ICD-10-CM

## 2020-11-10 DIAGNOSIS — I63.00 CEREBROVASCULAR ACCIDENT (CVA) DUE TO THROMBOSIS OF PRECEREBRAL ARTERY: ICD-10-CM

## 2020-11-10 DIAGNOSIS — Z01.818 PRE-OP TESTING: ICD-10-CM

## 2020-11-10 DIAGNOSIS — Z17.0 MALIGNANT NEOPLASM OF UPPER-OUTER QUADRANT OF RIGHT BREAST IN FEMALE, ESTROGEN RECEPTOR POSITIVE: ICD-10-CM

## 2020-11-10 PROCEDURE — 88341 IMHCHEM/IMCYTCHM EA ADD ANTB: CPT | Mod: 26,,, | Performed by: PATHOLOGY

## 2020-11-10 PROCEDURE — 88189 PR  FLOWCYTOMETRY/READ, 16 & > MARKERS: ICD-10-PCS | Mod: ,,, | Performed by: PATHOLOGY

## 2020-11-10 PROCEDURE — 88189 FLOWCYTOMETRY/READ 16 & >: CPT | Mod: ,,, | Performed by: PATHOLOGY

## 2020-11-10 PROCEDURE — 88341 PR IHC OR ICC EACH ADD'L SINGLE ANTIBODY  STAINPR: ICD-10-PCS | Mod: 26,,, | Performed by: PATHOLOGY

## 2020-11-10 PROCEDURE — 88341 IMHCHEM/IMCYTCHM EA ADD ANTB: CPT | Performed by: PATHOLOGY

## 2020-11-10 PROCEDURE — 63600175 PHARM REV CODE 636 W HCPCS: Performed by: RADIOLOGY

## 2020-11-10 PROCEDURE — 88184 FLOWCYTOMETRY/ TC 1 MARKER: CPT | Performed by: PATHOLOGY

## 2020-11-10 PROCEDURE — 88305 TISSUE EXAM BY PATHOLOGIST: ICD-10-PCS | Mod: 26,,, | Performed by: PATHOLOGY

## 2020-11-10 PROCEDURE — 88342 IMHCHEM/IMCYTCHM 1ST ANTB: CPT | Performed by: PATHOLOGY

## 2020-11-10 PROCEDURE — 88342 CHG IMMUNOCYTOCHEMISTRY: ICD-10-PCS | Mod: 26,,, | Performed by: PATHOLOGY

## 2020-11-10 PROCEDURE — 77012 CT SCAN FOR NEEDLE BIOPSY: CPT | Mod: TC

## 2020-11-10 PROCEDURE — 88342 IMHCHEM/IMCYTCHM 1ST ANTB: CPT | Mod: 26,,, | Performed by: PATHOLOGY

## 2020-11-10 PROCEDURE — 88305 TISSUE EXAM BY PATHOLOGIST: CPT | Performed by: PATHOLOGY

## 2020-11-10 PROCEDURE — 88185 FLOWCYTOMETRY/TC ADD-ON: CPT | Performed by: PATHOLOGY

## 2020-11-10 PROCEDURE — 88305 TISSUE EXAM BY PATHOLOGIST: CPT | Mod: 26,,, | Performed by: PATHOLOGY

## 2020-11-10 RX ORDER — FENTANYL CITRATE 50 UG/ML
INJECTION, SOLUTION INTRAMUSCULAR; INTRAVENOUS CODE/TRAUMA/SEDATION MEDICATION
Status: COMPLETED | OUTPATIENT
Start: 2020-11-10 | End: 2020-11-10

## 2020-11-10 RX ORDER — MIDAZOLAM HYDROCHLORIDE 1 MG/ML
INJECTION INTRAMUSCULAR; INTRAVENOUS CODE/TRAUMA/SEDATION MEDICATION
Status: COMPLETED | OUTPATIENT
Start: 2020-11-10 | End: 2020-11-10

## 2020-11-10 RX ADMIN — MIDAZOLAM HYDROCHLORIDE 1 MG: 1 INJECTION, SOLUTION INTRAMUSCULAR; INTRAVENOUS at 03:11

## 2020-11-10 RX ADMIN — FENTANYL CITRATE 50 MCG: 50 INJECTION, SOLUTION INTRAMUSCULAR; INTRAVENOUS at 03:11

## 2020-11-10 NOTE — SEDATION DOCUMENTATION
Procedure completed at this time. Band aid to right chest wall breast area puncture site C/D/I with no bleeding noted to site. VSS, NADN, and pt tolerated procedure well. Will continue to monitor pt.

## 2020-11-10 NOTE — DISCHARGE INSTRUCTIONS
Excisional Biopsy: Neck Lymph Node     This image shows many of the lymph nodes in the neck area. Your healthcare provider can show you which of your lymph nodes is affected.   The lymph nodes are part of the immune system. These small organs are located throughout the body. There are many of them in the neck. Neck lymph nodes sometimes enlarge. This is most often due to infection, but it can also be caused by cancer. Excisional biopsy helps find the cause of an enlarged lymph node. You may have already had other tests, such as a needle biopsy, but more information is needed for your healthcare provider to diagnose the problem. During an excisional biopsy, part or all of the enlarged lymph node is removed. The removed tissue is then sent to a lab for study. This sheet explains the biopsy procedure and what to expect.  Preparing for the procedure  Prepare for the surgery as you have been instructed. Be sure to tell your healthcare provider about all medicines you take. This includes over-the-counter medicines. It also includes herbs and other supplements. You may need to stop taking some or all of them before surgery. Also, follow any directions youre given for not eating or drinking before surgery.  The day of the procedure  The procedure takes about 60 minutes. Most people go home the same day.  Before the procedure begins  Here is what to expect before surgery:  An IV line is put into a vein in your arm or hand. This line delivers fluids and medicines.  · You will be given medicine (anesthesia) to help you sleep and keep you free of pain during surgery. This may be sedation, which makes you relaxed and sleepy. Local anesthesia is also injected near the lymph node to numb the area. Or, you may receive general anesthesia. This puts you into a state like deep sleep.  During the procedure  Here is what to expect during surgery:   · The skin over the enlarged lymph node is marked and cleaned.  · An incision (cut) is  made through the skin. If possible, the incision is made within the creases of the neck. This makes it less noticeable when it has healed.  · Part or all of the enlarged lymph node is removed. It is sent to a lab for testing.  · The incision is closed with sutures, staples, surgical glue, or surgical strips. It is then bandaged.  · A tube (drain) may be placed near the incision. This drains fluids that may build up after the procedure.  After the procedure  You will be taken to a room to wake up from the anesthesia. You may feel sleepy and nauseated at first. Medicine will be given to manage any pain. If you have a drain, you will be shown how to care for it. When you are ready to go home, have an adult family member or friend drive you. Follow your healthcare providers instructions for recovery, such as:  · Take any prescribed medicine as directed.  · Care for your incision as instructed.  · Avoid strenuous activity until your healthcare provider says its OK.  When to call the healthcare provider  Be sure you have a contact number for your healthcare provider so you can get in touch after office hours and on weekends if needed. After you get home, call this number if you have any of the following:  · Chest pain or trouble breathing (first call 911)  · Fever of 100.4°F (38°C) or higher, or as directed by your healthcare provider  · Soreness at the biopsy site that's getting worse  · Difficulty turning your head  · Symptoms of infection at an incision site, such as increased redness or swelling, warmth, worsening pain, or foul-smelling drainage  · Bleeding or bruising at the incision site  · Numbness or tingling in the mouth, jaw, neck, arm, or shoulder  · Problems speaking or swallowing  · Hoarse voice that worsens   Follow-up  During follow-up visits, your healthcare provider will check on your healing. If you have a drain, it will be removed 1 to 2 days after the procedure. Stitches or staples are removed about  7 to 10 days after the procedure. You and your healthcare provider will also discuss your biopsy results. If cancer was found, further treatment may be needed.  Risks and possible complications  Risks of this procedure include:  · Bleeding  · Infection  · Injury to the spinal accessory nerve, affecting movement of the shoulder (could be temporary or permanent)  · Injury to sensory nerves, affecting sensation of the earlobe, neck, or skin of the neck (could be temporary or permanent)  · Neck abscess  · Scarring  · Reopening of the incision  · Another enlarged lymph node  · Risks of anesthesia (you will discuss these with the anesthesiologist)   Date Last Reviewed: 12/9/2015  © 5234-7191 [employer]. [address] [place] [address], [place] [address]. All rights reserved. This information is not intended as a substitute for professional medical care. Always follow your healthcare professional's instructions.        Recovery After Procedural Sedation (Adult)   You have been given medicine by vein to make you sleep during your surgery. This may have included both a pain medicine and sleeping medicine. Most of the effects have worn off. But you may still have some drowsiness for the next 6 to 8 hours.  Home care  Follow these guidelines when you get home:  · For the next 8 hours, you should be watched by a responsible adult. This person should make sure your condition is not getting worse.  · Don't drink any alcohol for the next 24 hours.  · Don't drive, operate dangerous machinery, or make important business or personal decisions during the next 24 hours.  · To prevent injury or falls, use caution when standing and walking for at least 24 hours after your procedure.  Note: Your healthcare provider may tell you not to take any medicine by mouth for pain or sleep in the next 4 hours. These medicines may react with the medicines you were given in the hospital. This could cause a much stronger response than  usual.  Follow-up care  Follow up with your healthcare provider if you are not alert and back to your usual level of activity within 12 hours.  When to seek medical advice  Call your healthcare provider right away if any of these occur:  · Drowsiness gets worse  · Weakness or dizziness gets worse  · Repeated vomiting  · You can't be awakened  · Fever  · New rash  Lalo last reviewed this educational content on 9/1/2019  © 6635-1134 [employer]. [address] [address], [place] [address]. All rights reserved. This information is not intended as a substitute for professional medical care. Always follow your healthcare professional's instructions.

## 2020-11-10 NOTE — DISCHARGE SUMMARY
Pre Op Diagnosis: right chest mass     Post Op Diagnosis: same     Procedure:  Chest mass/nodule biopsy     Procedure performed by: Jocelyn GHOSH, Eryn BROWN     Written Informed Consent Obtained: Yes     Specimen Removed:  yes     Estimated Blood Loss:  minimal     Findings: Local anesthesia and moderate sedation were used.     The patient tolerated the procedure well and there were no complications.      Sterile technique was performed in the mid chest, lidocaine was used as a local anesthetic.  Multiple samples taken from the right chest wall mass.  Pt tolerated the procedure well without immediate complications.  Please see radiologist report for details. F/u with PCP and/or ordering physician.

## 2020-11-10 NOTE — SEDATION DOCUMENTATION
Pt placed on CT table at this time supine with arms above his head. CM in place, VSS, NADN, and pt verbalizes understanding of procedure.

## 2020-11-10 NOTE — PLAN OF CARE
Band aid to right chest wall puncture site C/D/I with no bleeding/redness/swelling noted. VSS, NADN, and pt meets criteria for discharge. Discharge instructions given to and reviewed with pt and pt verbalized understanding of all. Pt discharged to home, taken out via wheelchair and driven home by .

## 2020-11-13 LAB
FLOW CYTOMETRY ANTIBODIES ANALYZED - LYMPH NODE: NORMAL
FLOW CYTOMETRY COMMENT - LYMPH NODE: NORMAL
FLOW CYTOMETRY INTERPRETATION - LYMPH NODE: NORMAL

## 2020-11-16 LAB
FINAL PATHOLOGIC DIAGNOSIS: NORMAL
GROSS: NORMAL

## 2020-11-18 NOTE — PROGRESS NOTES
Subjective:       Patient ID: Susu Luther is a 70 y.o. female.    Chief Complaint: Malignant neoplasm of upper-outer quadrant of right breast in female, estrogen receptor positive [C50.411, Z17.0]  HPI: We have an opportunity to see Ms. Susu Luther in Hematology Oncology clinic at [facility] on 11/17/2020.  Ms. Susu Luther is a 70 y.o. woman with pT2N0 invasive lobular breast cancer ER positive CO negative Her2 negative s/p lumpectomy and sentinel node biopsy.       Was found right breast mass on self breast exam.  Screening mammogram on 2/26/2019 showed Impression:  Right  Mass: Right breast mass at the lower outer middle position. Assessment: 0 - Incomplete. Special Views: Spot Compression View and Ultrasound are recommended.      Left  There is no mammographic evidence of malignancy.     On March 4, 2019, underwent US guided biopsy showed FINAL PATHOLOGIC DIAGNOSIS  1. BREAST, RIGHT, LOWER OUTER 08:00, ULTRASOUND-GUIDED BIOPSY:  Invasive lobular carcinoma of breast.  Size of invasive carcinoma: 19 mm in greatest linear dimension within a single core biopsy fragment.  [place] Histologic Score: Grade 2 of 3.  Tubule formation: 3  Nuclear pleomorphism: 2  Mitoses: 1  Associated lobular carcinoma in situ (LCIS) is present.  No lymphovascular or perineural invasion.  Breast biomarkers are pending and will be included in the supplemental report.  2. AXILLA, RIGHT LYMPH NODES, ULTRASOUND-GUIDED BIOPSY:  Benign lymph node without evidence of metastatic carcinoma.  Immunohistochemical stains (Cam 5.2 and CK7) are confirmatory.     On March 27, 2019 underwent lumpectomy with sentinel node.  Pathology showed   PROCEDURE: Excision/lumpectomy  SPECIMEN LATERALITY: Right breast  TUMOR SITE: 8 o'clock  TUMOR SIZE: Approximately 4.0 x 3.0 x 3.0 cm  HISTOLOGIC TYPE: Invasive lobular carcinoma  HISTOLOGIC GRADE: Grade 2 of 3  Tubular differentiation: 3  Nuclear pleomorphism: 2  Mitotic rate:  1  TUMOR FOCALITY: Single focus of invasive carcinoma  DUCTAL CARCINOMA IN SITU: Not identified  LOBULAR CARCINOMA IN SITU: Present  MARGINS:  Main specimen (#6) : Carcinoma present at superior, deep and medial margins  Distance from inferior margin: 5 mm  Distance from lateral margin: greater than 20 mm  Distance of anterior margin: 15 mm  Additionally submitted margins (specimen #7): Distance from newly designated margin: 4 mm  REGIONAL LYMPH NODES: Uninvolved tumor cells  Number of total lymph nodes examined: 8  Number of sentinel nodes examined: 4  TREATMENT EFFECT: No known presurgical therapy  LYMPH-VASCULAR INVASION: Not identified  ANCILLARY STUDIES (performed on patient's previous biopsy MS ):  ER: Positive (95% of tumor cells)  FL: Negative (less than 1 % of tumor cells)  Her2 by FISH: Negative (not amplified)  Ki-67%: Positive (70 % of tumor cells)  ADDITIONAL FINDINGS: Apocrine metaplasia, usual ductal hyperplasia, fibroadenomatoid change  PATHOLOGIC STAGE CLASSIFICATION: pT2 pN0     Oncotype type DX recurrence score 22, with distant recurrence risk 8%, absolute chemotherapy benefit <1%.     Completed adjuvant radiation.  Has right breast tenderness.  Started on anastrozole.  Has arthralgias. Could not tolerate.  AI was changed to letrozole, tolerating well.     Reports ran out of her antihypertensive medications, has had high blood pressure at home and headaches.    Oncology History   Malignant neoplasm of upper-outer quadrant of right breast in female, estrogen receptor positive   3/14/2019 Initial Diagnosis    Malignant neoplasm of upper-outer quadrant of right breast in female, estrogen receptor positive     3/27/2019 Cancer Staged    Staging form: Breast, AJCC 8th Edition  - Pathologic stage from 3/27/2019: Stage IIA (pT2, pN0(sn), cM0, G2, ER+, FL-, HER2-) - Signed by Guido Huynh MD on 4/4/2019 5/20/2019 - 6/18/2019 Radiation Therapy    Treatment Site Ref. ID Energy Dose/Fx (Gy) #Fx  Dose Correction (Gy) Total Dose (Gy) Start Date End Date Elapsed Days   Boost Boost 18X 2.5 4 / 4 0 10 6/13/2019 6/18/2019 5   RtBreastAddMV Rt Breast 18X/6X 2.66 16 / 16 0 42.56 5/20/2019 6/12/2019 23            Past Medical History:   Diagnosis Date    Chronic diastolic congestive heart failure 8/25/2020    Encounter for blood transfusion     Hx of psychiatric care     Hypertension     Major depressive disorder, single episode, moderate with anxious distress 1/14/2020    Malignant neoplasm of upper-outer quadrant of right breast in female, estrogen receptor positive 3/14/2019    radiation    Psychiatric problem     Sleep difficulties     Stroke 2009    no residual defect    Therapy      Family History   Problem Relation Age of Onset    Diabetes Maternal Grandmother     Diabetes Maternal Grandfather     Diabetes Mother     Breast cancer Neg Hx     Colon cancer Neg Hx     Ovarian cancer Neg Hx      Social History     Socioeconomic History    Marital status:      Spouse name: Campos Luther    Number of children: 2    Years of education: Not on file    Highest education level: Not on file   Occupational History    Not on file   Social Needs    Financial resource strain: Not on file    Food insecurity     Worry: Not on file     Inability: Not on file    Transportation needs     Medical: Not on file     Non-medical: Not on file   Tobacco Use    Smoking status: Never Smoker    Smokeless tobacco: Never Used   Substance and Sexual Activity    Alcohol use: No    Drug use: No    Sexual activity: Yes     Partners: Male     Birth control/protection: Post-menopausal   Lifestyle    Physical activity     Days per week: Not on file     Minutes per session: Not on file    Stress: Not on file   Relationships    Social connections     Talks on phone: Not on file     Gets together: Not on file     Attends Jainism service: Not on file     Active member of club or organization: Not on file      Attends meetings of clubs or organizations: Not on file     Relationship status: Not on file   Other Topics Concern    Patient feels they ought to cut down on drinking/drug use Not Asked    Patient annoyed by others criticizing their drinking/drug use Not Asked    Patient has felt bad or guilty about drinking/drug use Not Asked    Patient has had a drink/used drugs as an eye opener in the AM Not Asked   Social History Narrative    Not on file     Past Surgical History:   Procedure Laterality Date    APPENDECTOMY      BREAST BIOPSY      2019    BREAST LUMPECTOMY      2019     SECTION      x 1    OOPHORECTOMY      right     SENTINEL LYMPH NODE BIOPSY Right 3/27/2019    Procedure: BIOPSY, LYMPH NODE, SENTINEL;  Surgeon: Artemio Starks MD;  Location: [facility];  Service: General;  Laterality: Right;     Current Outpatient Medications   Medication Sig Dispense Refill    albuterol (VENTOLIN HFA) 90 mcg/actuation inhaler Inhale 2 puffs into the lungs every 4 (four) hours as needed for Shortness of Breath. 18 g 11    ALPRAZolam (XANAX) 0.5 MG tablet Take 1 tablet (0.5 mg total) by mouth 3 (three) times daily as needed for Insomnia or Anxiety. 60 tablet 0    amLODIPine (NORVASC) 10 MG tablet Take 1 tablet (10 mg total) by mouth once daily. 90 tablet 2    aspirin (ECOTRIN) 81 MG EC tablet Take 1 tablet (81 mg total) by mouth once daily. 90 tablet 0    atorvastatin (LIPITOR) 40 MG tablet Take 1 tablet (40 mg total) by mouth once daily. 90 tablet 3    bismuth subsalicylate (PEPTO BISMOL) 262 mg/15 mL suspension Take 15 mLs by mouth every 6 (six) hours as needed for Indigestion.      blood pressure kit med and lrg Kit Take blood pressure first thing in the morning. (Patient not taking: Reported on 10/28/2020) 1 each 0    blood pressure kit med and lrg Kit Check blood pressure every morning 1 each 0    butalbital-acetaminophen-caff -40 mg -40 mg Cap Take 1 capsule by mouth 3 (three) times  daily as needed. 15 capsule 1    cholecalciferol, vitamin D3, 50,000 unit capsule Take 50,000 Units by mouth once a week.   0    cloNIDine (CATAPRES) 0.2 MG tablet Take 1 tablet (0.2 mg total) by mouth 3 (three) times daily. 270 tablet 2    emollient combination no.63 (MIADERM) Lotn Apply 1 application topically 3 (three) times daily.      erythromycin (ROMYCIN) ophthalmic ointment APPLY TO EYELASHES AT BEDTIME      evolocumab (REPATHA SURECLICK) 140 mg/mL PnIj Inject 1 mL (140 mg total) into the skin every 14 (fourteen) days. 2 Syringe 5    furosemide (LASIX) 20 MG tablet take 1/2 tablet by mouth a day. 90 tablet 0    gabapentin (NEURONTIN) 600 MG tablet Take 1 tablet (600 mg total) by mouth 3 (three) times daily. 90 tablet 3    hydrALAZINE (APRESOLINE) 25 MG tablet Take 1 tablet (25 mg total) by mouth every 8 (eight) hours. 90 tablet 1    hydroCHLOROthiazide (HYDRODIURIL) 25 MG tablet Take 1 tablet (25 mg total) by mouth once daily. 90 tablet 3    inhalation spacing device (COMPACT SPACE CHAMBER) Use as directed for inhalation. 1 Device 0    letrozole (FEMARA) 2.5 mg Tab Take 1 tablet (2.5 mg total) by mouth once daily. 30 tablet 11    lidocaine (LIDODERM) 5 % Place 1 patch onto the skin daily as needed. Remove & Discard patch within 12 hours or as directed by MD 30 patch 0    lidocaine HCL-menthoL 4-1 % PtMd Apply 1 patch topically daily as needed. 30 patch 6    lidocaine-prilocaine (EMLA) cream       losartan (COZAAR) 100 MG tablet Take 1 tablet by mouth once daily 90 tablet 1    meclizine (ANTIVERT) 12.5 mg tablet Take 1 tablet by mouth three times daily as needed 90 tablet 0    multivitamin (ONE DAILY MULTIVITAMIN) per tablet Take 1 tablet by mouth once daily.      NARCAN 4 mg/actuation [place] CALL 911. ADMINISTER A SINGLE SPRAY INTRANASALLY INTO ONE NOSTRIL UPON SIGNS OF OPIOID OVERDOSE. MAY REPEAT AFTER 3 MINUTES IF NO RESPONSE.  0    prednisoLONE acetate (PRED FORTE) 1 % DrpS INSTILL 1 DROP  INTO EACH EYE THREE TIMES DAILY      propranolol (INDERAL) 40 MG tablet Take 1 tablet (40 mg total) by mouth 2 (two) times daily. 60 tablet 11    sertraline (ZOLOFT) 50 MG tablet Take 1 tablet (50 mg total) by mouth once daily. 90 tablet 1    traMADoL (ULTRAM) 50 mg tablet Take 1 tablet (50 mg total) by mouth every 6 (six) hours as needed. 90 tablet 0    venlafaxine (EFFEXOR-XR) 75 MG 24 hr capsule Take 1 capsule (75 mg total) by mouth once daily. 90 capsule 1    zolpidem (AMBIEN) 5 MG Tab Take 1 tablet (5 mg total) by mouth nightly as needed (sleep). 60 tablet 1     No current facility-administered medications for this visit.        Labs:  Lab Results   Component Value Date    WBC 9.20 10/19/2020    HGB 12.0 10/19/2020    HCT 39.8 10/19/2020    MCV 83 10/19/2020     10/19/2020     BMP  Lab Results   Component Value Date     10/19/2020    K 4.3 10/19/2020     10/19/2020    CO2 26 10/19/2020    BUN 14 10/19/2020    CREATININE 1.2 10/19/2020    CALCIUM 9.9 10/19/2020    ANIONGAP 8 10/19/2020    ESTGFRAFRICA 54 (A) 10/19/2020    EGFRNONAA 47 (A) 10/19/2020     Lab Results   Component Value Date    ALT 24 10/19/2020    AST 21 10/19/2020    ALKPHOS 179 (H) 10/19/2020    BILITOT 0.3 10/19/2020       Lab Results   Component Value Date    IRON 69 12/16/2019    TIBC 302 12/16/2019    FERRITIN 56 12/16/2019     Lab Results   Component Value Date    TWKLDBAX24 994 (H) 12/16/2019     Lab Results   Component Value Date    FOLATE 3.9 (L) 12/16/2019     Lab Results   Component Value Date    TSH 1.170 12/16/2019       I have reviewed the radiology reports and examined the scan/xray images.    Review of Systems   Constitutional: Negative.    HENT: Negative.    Eyes: Negative.    Respiratory: Negative.    Cardiovascular: Negative.    Gastrointestinal: Negative.    Endocrine: Negative.    Genitourinary: Negative.    Musculoskeletal: Negative.    Skin: Negative.    Allergic/Immunologic: Negative.     Neurological: Negative.    Hematological: Negative.    Psychiatric/Behavioral: Negative.      ECOG SCORE    0 - Fully active-able to carry on all pre-disease performance without restriction            Objective:     Vitals:    11/19/20 1051   BP: (!) 167/106   Pulse: 92   Temp: 96.6 °F (35.9 °C)   Body mass index is 44.05 kg/m².  Physical Exam  Vitals signs and nursing note reviewed.   Constitutional:       Appearance: She is well-developed.   HENT:      Head: Normocephalic and atraumatic.   Eyes:      Conjunctiva/sclera: Conjunctivae normal.   Neck:      Musculoskeletal: Normal range of motion and neck supple.   Cardiovascular:      Rate and Rhythm: Normal rate and regular rhythm.   Pulmonary:      Effort: Pulmonary effort is normal.      Breath sounds: Normal breath sounds.   Abdominal:      General: Bowel sounds are normal.      Palpations: Abdomen is soft.   Musculoskeletal: Normal range of motion.   Skin:     General: Skin is warm and dry.   Neurological:      Mental Status: She is alert and oriented to person, place, and time.   Psychiatric:         Behavior: Behavior normal.         Thought Content: Thought content normal.         Judgment: Judgment normal.           Assessment:      1. Malignant neoplasm of upper-outer quadrant of right breast in female, estrogen receptor positive           Plan:     Malignant neoplasm of upper-outer quadrant of right breast in female, estrogen receptor positive    Will see Dr. Starks for excision of FDG avid mass in right upper left chest wall.  Biopsy was negative for cancer, showed benign lymph node.

## 2020-11-19 ENCOUNTER — OFFICE VISIT (OUTPATIENT)
Dept: SURGERY | Facility: CLINIC | Age: 70
End: 2020-11-19
Payer: MEDICARE

## 2020-11-19 ENCOUNTER — SPECIALTY PHARMACY (OUTPATIENT)
Dept: PHARMACY | Facility: CLINIC | Age: 70
End: 2020-11-19

## 2020-11-19 ENCOUNTER — LAB VISIT (OUTPATIENT)
Dept: LAB | Facility: HOSPITAL | Age: 70
End: 2020-11-19
Attending: SURGERY
Payer: MEDICARE

## 2020-11-19 ENCOUNTER — OFFICE VISIT (OUTPATIENT)
Dept: HEMATOLOGY/ONCOLOGY | Facility: CLINIC | Age: 70
End: 2020-11-19
Payer: MEDICARE

## 2020-11-19 VITALS
OXYGEN SATURATION: 99 % | TEMPERATURE: 97 F | HEIGHT: 69 IN | BODY MASS INDEX: 43.4 KG/M2 | HEART RATE: 92 BPM | DIASTOLIC BLOOD PRESSURE: 106 MMHG | SYSTOLIC BLOOD PRESSURE: 167 MMHG | WEIGHT: 293 LBS

## 2020-11-19 VITALS
DIASTOLIC BLOOD PRESSURE: 106 MMHG | HEIGHT: 69 IN | WEIGHT: 293 LBS | BODY MASS INDEX: 43.4 KG/M2 | HEART RATE: 92 BPM | TEMPERATURE: 98 F | SYSTOLIC BLOOD PRESSURE: 167 MMHG

## 2020-11-19 DIAGNOSIS — R59.1 LYMPHADENOPATHY: ICD-10-CM

## 2020-11-19 DIAGNOSIS — C50.411 MALIGNANT NEOPLASM OF UPPER-OUTER QUADRANT OF RIGHT BREAST IN FEMALE, ESTROGEN RECEPTOR POSITIVE: Chronic | ICD-10-CM

## 2020-11-19 DIAGNOSIS — R59.1 LYMPHADENOPATHY: Primary | ICD-10-CM

## 2020-11-19 DIAGNOSIS — Z17.0 MALIGNANT NEOPLASM OF UPPER-OUTER QUADRANT OF RIGHT BREAST IN FEMALE, ESTROGEN RECEPTOR POSITIVE: Chronic | ICD-10-CM

## 2020-11-19 DIAGNOSIS — C50.411 MALIGNANT NEOPLASM OF UPPER-OUTER QUADRANT OF RIGHT BREAST IN FEMALE, ESTROGEN RECEPTOR POSITIVE: Primary | Chronic | ICD-10-CM

## 2020-11-19 DIAGNOSIS — Z17.0 MALIGNANT NEOPLASM OF UPPER-OUTER QUADRANT OF RIGHT BREAST IN FEMALE, ESTROGEN RECEPTOR POSITIVE: Primary | Chronic | ICD-10-CM

## 2020-11-19 DIAGNOSIS — Z01.818 PRE-OP EXAM: ICD-10-CM

## 2020-11-19 LAB
ALBUMIN SERPL BCP-MCNC: 3.5 G/DL (ref 3.5–5.2)
ALP SERPL-CCNC: 143 U/L (ref 55–135)
ALT SERPL W/O P-5'-P-CCNC: 10 U/L (ref 10–44)
ANION GAP SERPL CALC-SCNC: 8 MMOL/L (ref 8–16)
AST SERPL-CCNC: 12 U/L (ref 10–40)
BASOPHILS # BLD AUTO: 0.06 K/UL (ref 0–0.2)
BASOPHILS NFR BLD: 0.8 % (ref 0–1.9)
BILIRUB SERPL-MCNC: 0.2 MG/DL (ref 0.1–1)
BUN SERPL-MCNC: 20 MG/DL (ref 8–23)
CALCIUM SERPL-MCNC: 9.5 MG/DL (ref 8.7–10.5)
CHLORIDE SERPL-SCNC: 106 MMOL/L (ref 95–110)
CO2 SERPL-SCNC: 27 MMOL/L (ref 23–29)
CREAT SERPL-MCNC: 1.2 MG/DL (ref 0.5–1.4)
DIFFERENTIAL METHOD: ABNORMAL
EOSINOPHIL # BLD AUTO: 0.1 K/UL (ref 0–0.5)
EOSINOPHIL NFR BLD: 1.5 % (ref 0–8)
ERYTHROCYTE [DISTWIDTH] IN BLOOD BY AUTOMATED COUNT: 15.8 % (ref 11.5–14.5)
EST. GFR  (AFRICAN AMERICAN): 53.7 ML/MIN/1.73 M^2
EST. GFR  (NON AFRICAN AMERICAN): 46.5 ML/MIN/1.73 M^2
GLUCOSE SERPL-MCNC: 95 MG/DL (ref 70–110)
HCT VFR BLD AUTO: 39.4 % (ref 37–48.5)
HGB BLD-MCNC: 11.7 G/DL (ref 12–16)
IMM GRANULOCYTES # BLD AUTO: 0.02 K/UL (ref 0–0.04)
IMM GRANULOCYTES NFR BLD AUTO: 0.3 % (ref 0–0.5)
LYMPHOCYTES # BLD AUTO: 2.3 K/UL (ref 1–4.8)
LYMPHOCYTES NFR BLD: 29.6 % (ref 18–48)
MCH RBC QN AUTO: 24.8 PG (ref 27–31)
MCHC RBC AUTO-ENTMCNC: 29.7 G/DL (ref 32–36)
MCV RBC AUTO: 84 FL (ref 82–98)
MONOCYTES # BLD AUTO: 0.7 K/UL (ref 0.3–1)
MONOCYTES NFR BLD: 8.5 % (ref 4–15)
NEUTROPHILS # BLD AUTO: 4.6 K/UL (ref 1.8–7.7)
NEUTROPHILS NFR BLD: 59.3 % (ref 38–73)
NRBC BLD-RTO: 0 /100 WBC
PLATELET # BLD AUTO: 266 K/UL (ref 150–350)
PMV BLD AUTO: 10 FL (ref 9.2–12.9)
POTASSIUM SERPL-SCNC: 4.1 MMOL/L (ref 3.5–5.1)
PROT SERPL-MCNC: 7.9 G/DL (ref 6–8.4)
RBC # BLD AUTO: 4.71 M/UL (ref 4–5.4)
SODIUM SERPL-SCNC: 141 MMOL/L (ref 136–145)
WBC # BLD AUTO: 7.78 K/UL (ref 3.9–12.7)

## 2020-11-19 PROCEDURE — 99214 PR OFFICE/OUTPT VISIT, EST, LEVL IV, 30-39 MIN: ICD-10-PCS | Mod: S$PBB,,, | Performed by: SURGERY

## 2020-11-19 PROCEDURE — 99214 OFFICE O/P EST MOD 30 MIN: CPT | Mod: PBBFAC | Performed by: INTERNAL MEDICINE

## 2020-11-19 PROCEDURE — 99999 PR PBB SHADOW E&M-EST. PATIENT-LVL IV: ICD-10-PCS | Mod: PBBFAC,,, | Performed by: SURGERY

## 2020-11-19 PROCEDURE — 99999 PR PBB SHADOW E&M-EST. PATIENT-LVL IV: CPT | Mod: PBBFAC,,, | Performed by: SURGERY

## 2020-11-19 PROCEDURE — 99999 PR PBB SHADOW E&M-EST. PATIENT-LVL IV: ICD-10-PCS | Mod: PBBFAC,,, | Performed by: INTERNAL MEDICINE

## 2020-11-19 PROCEDURE — 99214 OFFICE O/P EST MOD 30 MIN: CPT | Mod: S$PBB,,, | Performed by: SURGERY

## 2020-11-19 PROCEDURE — 85025 COMPLETE CBC W/AUTO DIFF WBC: CPT

## 2020-11-19 PROCEDURE — 99999 PR PBB SHADOW E&M-EST. PATIENT-LVL IV: CPT | Mod: PBBFAC,,, | Performed by: INTERNAL MEDICINE

## 2020-11-19 PROCEDURE — 99214 OFFICE O/P EST MOD 30 MIN: CPT | Mod: PBBFAC,27 | Performed by: SURGERY

## 2020-11-19 PROCEDURE — 99213 OFFICE O/P EST LOW 20 MIN: CPT | Mod: S$PBB,,, | Performed by: INTERNAL MEDICINE

## 2020-11-19 PROCEDURE — 36415 COLL VENOUS BLD VENIPUNCTURE: CPT

## 2020-11-19 PROCEDURE — 99213 PR OFFICE/OUTPT VISIT, EST, LEVL III, 20-29 MIN: ICD-10-PCS | Mod: S$PBB,,, | Performed by: INTERNAL MEDICINE

## 2020-11-19 PROCEDURE — 80053 COMPREHEN METABOLIC PANEL: CPT

## 2020-11-19 RX ORDER — LIDOCAINE HYDROCHLORIDE 10 MG/ML
1 INJECTION, SOLUTION EPIDURAL; INFILTRATION; INTRACAUDAL; PERINEURAL ONCE
Status: DISCONTINUED | OUTPATIENT
Start: 2020-11-19 | End: 2021-03-01 | Stop reason: CLARIF

## 2020-11-19 RX ORDER — LETROZOLE 2.5 MG/1
2.5 TABLET, FILM COATED ORAL DAILY
Qty: 30 TABLET | Refills: 11 | Status: SHIPPED | OUTPATIENT
Start: 2020-11-19 | End: 2021-09-07 | Stop reason: SDUPTHER

## 2020-11-19 RX ORDER — ONDANSETRON 4 MG/1
8 TABLET, ORALLY DISINTEGRATING ORAL EVERY 8 HOURS PRN
Status: CANCELLED | OUTPATIENT
Start: 2020-11-19

## 2020-11-19 RX ORDER — BUTALBITAL, ACETAMINOPHEN AND CAFFEINE 50; 325; 40 MG/1; MG/1; MG/1
1 CAPSULE ORAL 2 TIMES DAILY PRN
Qty: 60 CAPSULE | Refills: 1 | Status: SHIPPED | OUTPATIENT
Start: 2020-11-19 | End: 2020-12-14 | Stop reason: SDUPTHER

## 2020-11-19 NOTE — H&P (VIEW-ONLY)
Patient ID: Susu Luther is a 70 y.o. female.     abnormal PET scan    Chief Complaint: Follow-up      HPI:   Patient has a history of right-sided breast cancer.  She underwent a lumpectomy and sentinel biopsy.  Eight lymph nodes were negative.  She was treated by Medical Oncology.      A PET scan was obtained that showed a hypermetabolic activity in the subpectoral nodes on the right side.      A CT-guided biopsy showed inflammatory tissue.  Surgery was asked to see her for an excisional biopsy.      The patient still has significant breast and axillary tenderness after her lumpectomy sentinel node biopsy and radiation      Review of Systems   Constitutional: Negative for appetite change, chills, fatigue, fever and unexpected weight change.   HENT: Negative for hearing loss and rhinorrhea.    Eyes: Negative for visual disturbance.   Respiratory: Negative for apnea, cough, shortness of breath and wheezing.    Cardiovascular: Negative for chest pain and palpitations.   Gastrointestinal: Negative for abdominal distention, abdominal pain, blood in stool, constipation, diarrhea, nausea and vomiting.   Genitourinary: Negative for dysuria, frequency and urgency.   Musculoskeletal: Negative for arthralgias and neck pain.   Skin: Negative for rash.        Right breast tenderness   Neurological: Negative for seizures, weakness, numbness and headaches.   Hematological: Negative for adenopathy. Does not bruise/bleed easily.   Psychiatric/Behavioral: Negative for hallucinations. The patient is not nervous/anxious.        Current Outpatient Medications   Medication Sig Dispense Refill    albuterol (VENTOLIN HFA) 90 mcg/actuation inhaler Inhale 2 puffs into the lungs every 4 (four) hours as needed for Shortness of Breath. 18 g 11    ALPRAZolam (XANAX) 0.5 MG tablet Take 1 tablet (0.5 mg total) by mouth 3 (three) times daily as needed for Insomnia or Anxiety. 60 tablet 0    amLODIPine (NORVASC) 10 MG tablet Take 1 tablet  (10 mg total) by mouth once daily. 90 tablet 2    aspirin (ECOTRIN) 81 MG EC tablet Take 1 tablet (81 mg total) by mouth once daily. 90 tablet 0    atorvastatin (LIPITOR) 40 MG tablet Take 1 tablet (40 mg total) by mouth once daily. 90 tablet 3    bismuth subsalicylate (PEPTO BISMOL) 262 mg/15 mL suspension Take 15 mLs by mouth every 6 (six) hours as needed for Indigestion.      blood pressure kit med and lrg Kit Take blood pressure first thing in the morning. 1 each 0    blood pressure kit med and lrg Kit Check blood pressure every morning 1 each 0    butalbital-acetaminophen-caff -40 mg -40 mg Cap Take 1 capsule by mouth 2 (two) times daily as needed. 60 capsule 1    cholecalciferol, vitamin D3, 50,000 unit capsule Take 50,000 Units by mouth once a week.   0    cloNIDine (CATAPRES) 0.2 MG tablet Take 1 tablet (0.2 mg total) by mouth 3 (three) times daily. 270 tablet 2    emollient combination no.63 (MIADERM) Lotn Apply 1 application topically 3 (three) times daily.      erythromycin (ROMYCIN) ophthalmic ointment APPLY TO EYELASHES AT BEDTIME      evolocumab (REPATHA SURECLICK) 140 mg/mL PnIj Inject 1 mL (140 mg total) into the skin every 14 (fourteen) days. 2 Syringe 5    furosemide (LASIX) 20 MG tablet take 1/2 tablet by mouth a day. 90 tablet 0    gabapentin (NEURONTIN) 600 MG tablet Take 1 tablet (600 mg total) by mouth 3 (three) times daily. 90 tablet 3    hydrALAZINE (APRESOLINE) 25 MG tablet Take 1 tablet (25 mg total) by mouth every 8 (eight) hours. 90 tablet 1    hydroCHLOROthiazide (HYDRODIURIL) 25 MG tablet Take 1 tablet (25 mg total) by mouth once daily. 90 tablet 3    inhalation spacing device (COMPACT SPACE CHAMBER) Use as directed for inhalation. 1 Device 0    letrozole (FEMARA) 2.5 mg Tab Take 1 tablet (2.5 mg total) by mouth once daily. 30 tablet 11    lidocaine (LIDODERM) 5 % Place 1 patch onto the skin daily as needed. Remove & Discard patch within 12 hours or as  directed by MD 30 patch 0    lidocaine HCL-menthoL 4-1 % PtMd Apply 1 patch topically daily as needed. 30 patch 6    lidocaine-prilocaine (EMLA) cream       losartan (COZAAR) 100 MG tablet Take 1 tablet by mouth once daily 90 tablet 1    meclizine (ANTIVERT) 12.5 mg tablet Take 1 tablet by mouth three times daily as needed 90 tablet 0    multivitamin (ONE DAILY MULTIVITAMIN) per tablet Take 1 tablet by mouth once daily.      NARCAN 4 mg/actuation [place] CALL 911. ADMINISTER A SINGLE SPRAY INTRANASALLY INTO ONE NOSTRIL UPON SIGNS OF OPIOID OVERDOSE. MAY REPEAT AFTER 3 MINUTES IF NO RESPONSE.  0    prednisoLONE acetate (PRED FORTE) 1 % DrpS INSTILL 1 DROP INTO EACH EYE THREE TIMES DAILY      propranolol (INDERAL) 40 MG tablet Take 1 tablet (40 mg total) by mouth 2 (two) times daily. 60 tablet 11    sertraline (ZOLOFT) 50 MG tablet Take 1 tablet (50 mg total) by mouth once daily. 90 tablet 1    traMADoL (ULTRAM) 50 mg tablet Take 1 tablet (50 mg total) by mouth every 6 (six) hours as needed. 90 tablet 0    venlafaxine (EFFEXOR-XR) 75 MG 24 hr capsule Take 1 capsule (75 mg total) by mouth once daily. 90 capsule 1    zolpidem (AMBIEN) 5 MG Tab Take 1 tablet (5 mg total) by mouth nightly as needed (sleep). 60 tablet 1     Current Facility-Administered Medications   Medication Dose Route Frequency Provider Last Rate Last Dose    lidocaine (PF) 10 mg/ml (1%) injection 10 mg  1 mL Intradermal Once Artemio Starks MD           Review of patient's allergies indicates:   Allergen Reactions    Pcn [penicillins] Rash       Past Medical History:   Diagnosis Date    Chronic diastolic congestive heart failure 8/25/2020    Encounter for blood transfusion     Hx of psychiatric care     Hypertension     Major depressive disorder, single episode, moderate with anxious distress 1/14/2020    Malignant neoplasm of upper-outer quadrant of right breast in female, estrogen receptor positive 3/14/2019    radiation     Psychiatric problem     Sleep difficulties     Stroke 2009    no residual defect    Therapy        Past Surgical History:   Procedure Laterality Date    APPENDECTOMY      BREAST BIOPSY      2019    BREAST LUMPECTOMY      2019     SECTION      x 1    OOPHORECTOMY      right     SENTINEL LYMPH NODE BIOPSY Right 3/27/2019    Procedure: BIOPSY, LYMPH NODE, SENTINEL;  Surgeon: Artemio Starks MD;  [facility];  Service: General;  Laterality: Right;       Family History   Problem Relation Age of Onset    Diabetes Maternal Grandmother     Diabetes Maternal Grandfather     Diabetes Mother     Breast cancer Neg Hx     Colon cancer Neg Hx     Ovarian cancer Neg Hx        Social History     Socioeconomic History    Marital status:      Spouse name: Campos Luther    Number of children: 2    Years of education: Not on file    Highest education level: Not on file   Occupational History    Not on file   Social Needs    Financial resource strain: Not on file    Food insecurity     Worry: Not on file     Inability: Not on file    Transportation needs     Medical: Not on file     Non-medical: Not on file   Tobacco Use    Smoking status: Never Smoker    Smokeless tobacco: Never Used   Substance and Sexual Activity    Alcohol use: No    Drug use: No    Sexual activity: Yes     Partners: Male     Birth control/protection: Post-menopausal   Lifestyle    Physical activity     Days per week: Not on file     Minutes per session: Not on file    Stress: Not on file   Relationships    Social connections     Talks on phone: Not on file     Gets together: Not on file     Attends Buddhism service: Not on file     Active member of club or organization: Not on file     Attends meetings of clubs or organizations: Not on file     Relationship status: Not on file   Other Topics Concern    Patient feels they ought to cut down on drinking/drug use Not Asked    Patient annoyed by others  criticizing their drinking/drug use Not Asked    Patient has felt bad or guilty about drinking/drug use Not Asked    Patient has had a drink/used drugs as an eye opener in the AM Not Asked   Social History Narrative    Not on file       Vitals:    11/19/20 1357   BP: (!) 167/106   Pulse: 92   Temp: 98.1 °F (36.7 °C)       Physical Exam  Vitals signs reviewed.   Constitutional:       Appearance: She is well-developed. She is obese.   HENT:      Head: Normocephalic.   Eyes:      Pupils: Pupils are equal, round, and reactive to light.   Neck:      Thyroid: No thyromegaly.      Vascular: No JVD.      Trachea: No tracheal deviation.   Cardiovascular:      Rate and Rhythm: Normal rate and regular rhythm.      Heart sounds: Normal heart sounds.   Pulmonary:      Breath sounds: Normal breath sounds. No wheezing.   Abdominal:      General: Bowel sounds are normal. There is no distension.      Palpations: Abdomen is soft. Abdomen is not rigid. There is no mass.      Tenderness: There is no abdominal tenderness. There is no guarding or rebound.   Musculoskeletal: Normal range of motion.   Lymphadenopathy:      Cervical: No cervical adenopathy.   Skin:     General: Skin is warm and dry.      Findings: No erythema or rash.      Comments: There are well-healed right breast and axillary incisions.  There is no palpable axillary adenopathy   Neurological:      Mental Status: She is alert and oriented to person, place, and time.      PET scan was reviewed    Head/neck: There is normal physiologic FDG uptake noted within the visualized brain parenchyma. No FDG avid lymphadenopathy within the neck.Physiologic FDG uptake is noted within the strap muscles of the neck bilaterally.  A focal area of uptake and slight nodular thickening is noted within the posterior musculature on the left with SUV max of 5.2 (CT series 3, image 38).     Chest: There are post treatment changes from previous surgery and radiation within the right breast.   A large hypermetabolic lymph node mass is identified in the right subpectoral space measuring 6.9 x 3.2 cm with SUV max of 13.0. No FDG avid pulmonary nodules/masses.Physiologic FDG uptake within the erector spinae musculature.     Abdomen/Pelvis: Normal physiologic FDG uptake noted within the liver, spleen, urinary tract, and bowel.No FDG avid retroperitoneal lymph nodes.  Physiologic FDG uptake within the erector spine a and gluteal musculature.     Skeletal: No FDG avid osseus lesions identified.     Impression:     1. Post treatment changes of lumpectomy and radiation right breast.  2. Large hypermetabolic right subpectoral lymph node mass.  3. Physiologic uptake within the strap musculature of the of the neck bilaterally.  Focal uptake and thickening within posterior musculature on the left favored to be physiologic though metastasis not entirely excluded.     pathology showed reactive changes     EKG was read    Assessment & Plan:       history of right breast cancer     hypermetabolic right subpectoral lymph node     incisional biopsy versus excisional biopsy of right subpectoral lymph nodes through an axillary incision.      Need for surgery, need to obtain tissue to establish the diagnosis was discussed with the patient.       surgery will be scheduled for  December 2nd    The risks of surgery include infection, bleeding, arm swelling, rarely arm numbness, hematoma and/or seroma.  Questions were answered.  Consent was obtained.  Patient will need CBC, CMP and COVID testing

## 2020-11-19 NOTE — TELEPHONE ENCOUNTER
Specialty Pharmacy - Refill Coordination    Specialty Medication Orders Linked to Encounter      Most Recent Value   Medication #1  letrozole (FEMARA) 2.5 mg Tab (Order#[id number], Rx#[id number])          Refill Questions - Documented Responses      Most Recent Value   Relationship to patient of person spoken to?  Self   HIPAA/medical authority confirmed?  Yes   Any changes in contact preferences or allowed representatives?  No   Has the patient had any insurance changes?  No   Has the patient had any changes to specialty medication, dose, or instructions?  No   Has the patient started taking any new medications, herbals, or supplements?  No   Has the patient been diagnosed with any new medical conditions?  No   Does the patient have any new allergies to medications or foods?  No   Does the patient have any concerns about side effects?  No   Can the patient store medication/sharps container properly (at the correct temperature, away from children/pets, etc.)?  Yes   Can the patient call emergency services (911) in the event of an emergency?  Yes   Does the patient have any concerns or questions about taking or administering this medication as prescribed?  No   How many doses did the patient miss in the past 4 weeks or since the last fill?  0   How many doses does the patient have on hand?  5   How many days does the patient report on hand quantity will last?  5   Does the number of doses/days supply remaining match pharmacy expected amounts?  Yes   Does the patient feel that this medication is effective?  Yes   During the past 4 weeks, has patient missed any activities due to condition or medication?  No   During the past 4 weeks, did patient have any of the following urgent care visits?  None   How will the patient receive the medication?  Mail   When does the patient need to receive the medication?  11/24/20   Shipping Address  Home   Address in [facility] confirmed and updated if neccessary?  Yes    Expected Copay ($)  0   Is the patient able to afford the medication copay?  Yes   Payment Method  zero copay   Days supply of Refill  30   Would patient like to speak to a pharmacist?  Yes   Do you want to trigger an intervention?  No   Do you want to trigger an additional referral task?  No   Refill activity completed?  Yes   Refill activity plan  Refill scheduled   Shipment/Pickup Date:  11/19/20          Current Outpatient Medications   Medication Sig    albuterol (VENTOLIN HFA) 90 mcg/actuation inhaler Inhale 2 puffs into the lungs every 4 (four) hours as needed for Shortness of Breath.    ALPRAZolam (XANAX) 0.5 MG tablet Take 1 tablet (0.5 mg total) by mouth 3 (three) times daily as needed for Insomnia or Anxiety.    amLODIPine (NORVASC) 10 MG tablet Take 1 tablet (10 mg total) by mouth once daily.    aspirin (ECOTRIN) 81 MG EC tablet Take 1 tablet (81 mg total) by mouth once daily.    atorvastatin (LIPITOR) 40 MG tablet Take 1 tablet (40 mg total) by mouth once daily.    bismuth subsalicylate (PEPTO BISMOL) 262 mg/15 mL suspension Take 15 mLs by mouth every 6 (six) hours as needed for Indigestion.    blood pressure kit med and lrg Kit Take blood pressure first thing in the morning.    blood pressure kit med and lrg Kit Check blood pressure every morning    butalbital-acetaminophen-caff -40 mg -40 mg Cap Take 1 capsule by mouth 2 (two) times daily as needed.    cholecalciferol, vitamin D3, 50,000 unit capsule Take 50,000 Units by mouth once a week.     cloNIDine (CATAPRES) 0.2 MG tablet Take 1 tablet (0.2 mg total) by mouth 3 (three) times daily.    emollient combination no.63 (MIADERM) Lotn Apply 1 application topically 3 (three) times daily.    erythromycin (ROMYCIN) ophthalmic ointment APPLY TO EYELASHES AT BEDTIME    evolocumab (REPATHA SURECLICK) 140 mg/mL PnIj Inject 1 mL (140 mg total) into the skin every 14 (fourteen) days.    furosemide (LASIX) 20 MG tablet take 1/2 tablet  by mouth a day.    gabapentin (NEURONTIN) 600 MG tablet Take 1 tablet (600 mg total) by mouth 3 (three) times daily.    hydrALAZINE (APRESOLINE) 25 MG tablet Take 1 tablet (25 mg total) by mouth every 8 (eight) hours.    hydroCHLOROthiazide (HYDRODIURIL) 25 MG tablet Take 1 tablet (25 mg total) by mouth once daily.    inhalation spacing device (COMPACT SPACE CHAMBER) Use as directed for inhalation.    letrozole (FEMARA) 2.5 mg Tab Take 1 tablet (2.5 mg total) by mouth once daily.    lidocaine (LIDODERM) 5 % Place 1 patch onto the skin daily as needed. Remove & Discard patch within 12 hours or as directed by MD    lidocaine HCL-menthoL 4-1 % PtMd Apply 1 patch topically daily as needed.    lidocaine-prilocaine (EMLA) cream     losartan (COZAAR) 100 MG tablet Take 1 tablet by mouth once daily    meclizine (ANTIVERT) 12.5 mg tablet Take 1 tablet by mouth three times daily as needed    multivitamin (ONE DAILY MULTIVITAMIN) per tablet Take 1 tablet by mouth once daily.    NARCAN 4 mg/actuation [place] CALL 911. ADMINISTER A SINGLE SPRAY INTRANASALLY INTO ONE NOSTRIL UPON SIGNS OF OPIOID OVERDOSE. MAY REPEAT AFTER 3 MINUTES IF NO RESPONSE.    prednisoLONE acetate (PRED FORTE) 1 % DrpS INSTILL 1 DROP INTO EACH EYE THREE TIMES DAILY    propranolol (INDERAL) 40 MG tablet Take 1 tablet (40 mg total) by mouth 2 (two) times daily.    sertraline (ZOLOFT) 50 MG tablet Take 1 tablet (50 mg total) by mouth once daily.    traMADoL (ULTRAM) 50 mg tablet Take 1 tablet (50 mg total) by mouth every 6 (six) hours as needed.    venlafaxine (EFFEXOR-XR) 75 MG 24 hr capsule Take 1 capsule (75 mg total) by mouth once daily.    zolpidem (AMBIEN) 5 MG Tab Take 1 tablet (5 mg total) by mouth nightly as needed (sleep).   Last reviewed on 11/19/2020  2:31 PM by Artemio Starks MD    Review of patient's allergies indicates:   Allergen Reactions    Pcn [penicillins] Rash    Last reviewed on  11/19/2020 2:31 PM by Artemio  Raudel      Patient reports she was told by provider to stay on letrozole for now. New RX sent today.     Tasks added this encounter   12/17/2020 - Refill Call (Auto Added)   Tasks due within next 3 months   No tasks due.     Carolin Henao, PharmD  [facility]  [address] [place] [id number]  Phone: [phone]  Fax: [phone]

## 2020-11-19 NOTE — PATIENT INSTRUCTIONS
" Please stop taking  Your aspirin as of  November 24       surgery scheduled for next  Wednesday December 2nd at 10 in the morning.  The hospital call you with the time of arrival.      No solid food after midnight on the 24th but you may have clear liquids up to 3 hr before your  Arrival time.      Please take all your usual medications on the morning of surgery     if any of her labs are abnormal we will let you know.      You will need to do COVID testing prior to surgery    [facility]  Instructions for Patients and Families    You are invited to share your experience with me and my staff.  If you receive a survey in the mail, please return it at your convenience, or complete a brief survey on [employer].  We value your opinion!        Did you know that [facility] can be used to make appointments?  Just select "Schedule Appointment" under the "Visits" menu.    Notify you if any of your preop laboratories show abnormalities.  I    Before surgery:  The pre-op nurse from the hospital will call you before the day of your surgery to confirm your arrival time.  The time of your surgery may change due to emergencies or other unforeseen events.  Do not eat or drink anything after midnight the night before your procedure, except for clear liquids up to three hours before your surgery time, and sips of water with medication.  If you are not diabetic, it is recommended that you drink a glass of clear fruit juice (apple, grape, cranberry, not orange) three hours before your surgery time, but nothing after that.  If you are diabetic, you may have water or sugar-free clear liquids such as Crystal Light up to three hours before your surgery time.    Day of Surgery:  · You will go to a pre-op area where an IV will be started and you will speak to the anesthesiologist and surgeon.  · Your family will be updated throughout the operation.  After surgery, your family member may be taken to a private " room for consultation with the surgeon.  This is for the privacy of your medical information and does not necessarily mean there is anything wrong.    If your incisions have:  · Glue:  You may take a bath or shower immediately and wash your skin as you normally do.  The glue will eventually crumble or peel off. Do not let your incisions soak under water.  · Strips: Leave them on, but it is OK if they fall off on their own. It is OK to get them wet 48 hours after surgery.  · Bandage: You may remove it 2 days after your surgery, and then you may leave the incision open and take a shower or bath, unless otherwise instructed. If your bandage has clear plastic, you may shower with it on and then remove it 2 days after surgery.    Activities  · Walking is recommended after surgery; bed rest is not recommended unless specifically ordered.  · If you have had abdominal surgery, do not lift over 20 pounds for 4 weeks after surgery.  · If you have had hernia surgery, do not lift over 20 pounds for 6 weeks after surgery.  · You may drive when you are off your post-operative pain medication.  · Do not smoke after surgery, it decreases your ability to heal and increases the risk of infection and pneumonia.    Diet:  Drink lots of fluids after surgery.  You might not have much of an appetite at first, you may eat regular food when you feel ready, unless you are given special diet instructions.    Post-operative symptoms and medications  · It is safe to take over-the-counter medications for constipation, heartburn, sleep, or itching if needed.  Prescription pain medication may contain acetaminophen (Tylenol), so you should not take additional acetaminophen (Tylenol) at the same time as your pain medication.  · You may experience nausea, low fever/chills, and clear drainage from your incision, sometimes up to a month after surgery.  Notify our office if you have fever over 101 degrees, worsening redness around your incision, thick  "cloudy drainage, or inability to drink any liquids.  · You will experience some level of pain after surgery.  Your pain medication should help with the pain, but may not be able to eliminate it entirely.  Pain will decrease with time, and most pain will be gone by 4 to 6 weeks after surgery.  · We are not able to call in prescriptions for pain medication after hours or on weekends.  If your pain medication is ineffective or you will run out soon and need a refill, please call our office at [phone].  We are not able to replace pain medication that has been lost or stolen.    After surgery, you will either be discharged home or admitted to the hospital.  If you are admitted to the hospital, one of the surgeons or a physician assistant will see you once a day.  Due to scheduled surgery, we may see you in the afternoon or at night; however, your nurse is able to page us at any time.  If you feel there is a situation that is not being addressed properly, please dial [phone] from the phone in your room.    Follow-up appointment  · You will see your surgeon or a physician assistant in clinic for a follow-up appointment at either our [facility] (off [facility]) or [facility] (off [facility]) locations, usually between one and four weeks after your surgery.  · The hospital nurses can make your follow up appointment, or you can make it online at [email or web address] or call [phone].  · If you have a smartphone with the [email or web address] sasha, please let us know if you would like to do a phone visit instead of a post-op office visit.    If you are signed up for [facility], install the [email or web address]" sasha to access your test results, send messages to your doctors, and schedule appointments from your smartphone!    "

## 2020-11-19 NOTE — PROGRESS NOTES
Patient ID: Susu Luther is a 70 y.o. female.     abnormal PET scan    Chief Complaint: Follow-up      HPI:   Patient has a history of right-sided breast cancer.  She underwent a lumpectomy and sentinel biopsy.  Eight lymph nodes were negative.  She was treated by Medical Oncology.      A PET scan was obtained that showed a hypermetabolic activity in the subpectoral nodes on the right side.      A CT-guided biopsy showed inflammatory tissue.  Surgery was asked to see her for an excisional biopsy.      The patient still has significant breast and axillary tenderness after her lumpectomy sentinel node biopsy and radiation      Review of Systems   Constitutional: Negative for appetite change, chills, fatigue, fever and unexpected weight change.   HENT: Negative for hearing loss and rhinorrhea.    Eyes: Negative for visual disturbance.   Respiratory: Negative for apnea, cough, shortness of breath and wheezing.    Cardiovascular: Negative for chest pain and palpitations.   Gastrointestinal: Negative for abdominal distention, abdominal pain, blood in stool, constipation, diarrhea, nausea and vomiting.   Genitourinary: Negative for dysuria, frequency and urgency.   Musculoskeletal: Negative for arthralgias and neck pain.   Skin: Negative for rash.        Right breast tenderness   Neurological: Negative for seizures, weakness, numbness and headaches.   Hematological: Negative for adenopathy. Does not bruise/bleed easily.   Psychiatric/Behavioral: Negative for hallucinations. The patient is not nervous/anxious.        Current Outpatient Medications   Medication Sig Dispense Refill    albuterol (VENTOLIN HFA) 90 mcg/actuation inhaler Inhale 2 puffs into the lungs every 4 (four) hours as needed for Shortness of Breath. 18 g 11    ALPRAZolam (XANAX) 0.5 MG tablet Take 1 tablet (0.5 mg total) by mouth 3 (three) times daily as needed for Insomnia or Anxiety. 60 tablet 0    amLODIPine (NORVASC) 10 MG tablet Take 1 tablet  (10 mg total) by mouth once daily. 90 tablet 2    aspirin (ECOTRIN) 81 MG EC tablet Take 1 tablet (81 mg total) by mouth once daily. 90 tablet 0    atorvastatin (LIPITOR) 40 MG tablet Take 1 tablet (40 mg total) by mouth once daily. 90 tablet 3    bismuth subsalicylate (PEPTO BISMOL) 262 mg/15 mL suspension Take 15 mLs by mouth every 6 (six) hours as needed for Indigestion.      blood pressure kit med and lrg Kit Take blood pressure first thing in the morning. 1 each 0    blood pressure kit med and lrg Kit Check blood pressure every morning 1 each 0    butalbital-acetaminophen-caff -40 mg -40 mg Cap Take 1 capsule by mouth 2 (two) times daily as needed. 60 capsule 1    cholecalciferol, vitamin D3, 50,000 unit capsule Take 50,000 Units by mouth once a week.   0    cloNIDine (CATAPRES) 0.2 MG tablet Take 1 tablet (0.2 mg total) by mouth 3 (three) times daily. 270 tablet 2    emollient combination no.63 (MIADERM) Lotn Apply 1 application topically 3 (three) times daily.      erythromycin (ROMYCIN) ophthalmic ointment APPLY TO EYELASHES AT BEDTIME      evolocumab (REPATHA SURECLICK) 140 mg/mL PnIj Inject 1 mL (140 mg total) into the skin every 14 (fourteen) days. 2 Syringe 5    furosemide (LASIX) 20 MG tablet take 1/2 tablet by mouth a day. 90 tablet 0    gabapentin (NEURONTIN) 600 MG tablet Take 1 tablet (600 mg total) by mouth 3 (three) times daily. 90 tablet 3    hydrALAZINE (APRESOLINE) 25 MG tablet Take 1 tablet (25 mg total) by mouth every 8 (eight) hours. 90 tablet 1    hydroCHLOROthiazide (HYDRODIURIL) 25 MG tablet Take 1 tablet (25 mg total) by mouth once daily. 90 tablet 3    inhalation spacing device (COMPACT SPACE CHAMBER) Use as directed for inhalation. 1 Device 0    letrozole (FEMARA) 2.5 mg Tab Take 1 tablet (2.5 mg total) by mouth once daily. 30 tablet 11    lidocaine (LIDODERM) 5 % Place 1 patch onto the skin daily as needed. Remove & Discard patch within 12 hours or as  directed by MD 30 patch 0    lidocaine HCL-menthoL 4-1 % PtMd Apply 1 patch topically daily as needed. 30 patch 6    lidocaine-prilocaine (EMLA) cream       losartan (COZAAR) 100 MG tablet Take 1 tablet by mouth once daily 90 tablet 1    meclizine (ANTIVERT) 12.5 mg tablet Take 1 tablet by mouth three times daily as needed 90 tablet 0    multivitamin (ONE DAILY MULTIVITAMIN) per tablet Take 1 tablet by mouth once daily.      NARCAN 4 mg/actuation [place] CALL 911. ADMINISTER A SINGLE SPRAY INTRANASALLY INTO ONE NOSTRIL UPON SIGNS OF OPIOID OVERDOSE. MAY REPEAT AFTER 3 MINUTES IF NO RESPONSE.  0    prednisoLONE acetate (PRED FORTE) 1 % DrpS INSTILL 1 DROP INTO EACH EYE THREE TIMES DAILY      propranolol (INDERAL) 40 MG tablet Take 1 tablet (40 mg total) by mouth 2 (two) times daily. 60 tablet 11    sertraline (ZOLOFT) 50 MG tablet Take 1 tablet (50 mg total) by mouth once daily. 90 tablet 1    traMADoL (ULTRAM) 50 mg tablet Take 1 tablet (50 mg total) by mouth every 6 (six) hours as needed. 90 tablet 0    venlafaxine (EFFEXOR-XR) 75 MG 24 hr capsule Take 1 capsule (75 mg total) by mouth once daily. 90 capsule 1    zolpidem (AMBIEN) 5 MG Tab Take 1 tablet (5 mg total) by mouth nightly as needed (sleep). 60 tablet 1     Current Facility-Administered Medications   Medication Dose Route Frequency Provider Last Rate Last Dose    lidocaine (PF) 10 mg/ml (1%) injection 10 mg  1 mL Intradermal Once Artemio Starks MD           Review of patient's allergies indicates:   Allergen Reactions    Pcn [penicillins] Rash       Past Medical History:   Diagnosis Date    Chronic diastolic congestive heart failure 8/25/2020    Encounter for blood transfusion     Hx of psychiatric care     Hypertension     Major depressive disorder, single episode, moderate with anxious distress 1/14/2020    Malignant neoplasm of upper-outer quadrant of right breast in female, estrogen receptor positive 3/14/2019    radiation     Psychiatric problem     Sleep difficulties     Stroke 2009    no residual defect    Therapy        Past Surgical History:   Procedure Laterality Date    APPENDECTOMY      BREAST BIOPSY      2019    BREAST LUMPECTOMY      2019     SECTION      x 1    OOPHORECTOMY      right     SENTINEL LYMPH NODE BIOPSY Right 3/27/2019    Procedure: BIOPSY, LYMPH NODE, SENTINEL;  Surgeon: Artemio Starks MD;  Location: [facility];  Service: General;  Laterality: Right;       Family History   Problem Relation Age of Onset    Diabetes Maternal Grandmother     Diabetes Maternal Grandfather     Diabetes Mother     Breast cancer Neg Hx     Colon cancer Neg Hx     Ovarian cancer Neg Hx        Social History     Socioeconomic History    Marital status:      Spouse name: Campos Luther    Number of children: 2    Years of education: Not on file    Highest education level: Not on file   Occupational History    Not on file   Social Needs    Financial resource strain: Not on file    Food insecurity     Worry: Not on file     Inability: Not on file    Transportation needs     Medical: Not on file     Non-medical: Not on file   Tobacco Use    Smoking status: Never Smoker    Smokeless tobacco: Never Used   Substance and Sexual Activity    Alcohol use: No    Drug use: No    Sexual activity: Yes     Partners: Male     Birth control/protection: Post-menopausal   Lifestyle    Physical activity     Days per week: Not on file     Minutes per session: Not on file    Stress: Not on file   Relationships    Social connections     Talks on phone: Not on file     Gets together: Not on file     Attends [employer] service: Not on file     Active member of club or organization: Not on file     Attends meetings of clubs or organizations: Not on file     Relationship status: Not on file   Other Topics Concern    Patient feels they ought to cut down on drinking/drug use Not Asked    Patient annoyed by others  criticizing their drinking/drug use Not Asked    Patient has felt bad or guilty about drinking/drug use Not Asked    Patient has had a drink/used drugs as an eye opener in the AM Not Asked   Social History Narrative    Not on file       Vitals:    11/19/20 1357   BP: (!) 167/106   Pulse: 92   Temp: 98.1 °F (36.7 °C)       Physical Exam  Vitals signs reviewed.   Constitutional:       Appearance: She is well-developed. She is obese.   HENT:      Head: Normocephalic.   Eyes:      Pupils: Pupils are equal, round, and reactive to light.   Neck:      Thyroid: No thyromegaly.      Vascular: No JVD.      Trachea: No tracheal deviation.   Cardiovascular:      Rate and Rhythm: Normal rate and regular rhythm.      Heart sounds: Normal heart sounds.   Pulmonary:      Breath sounds: Normal breath sounds. No wheezing.   Abdominal:      General: Bowel sounds are normal. There is no distension.      Palpations: Abdomen is soft. Abdomen is not rigid. There is no mass.      Tenderness: There is no abdominal tenderness. There is no guarding or rebound.   Musculoskeletal: Normal range of motion.   Lymphadenopathy:      Cervical: No cervical adenopathy.   Skin:     General: Skin is warm and dry.      Findings: No erythema or rash.      Comments: There are well-healed right breast and axillary incisions.  There is no palpable axillary adenopathy   Neurological:      Mental Status: She is alert and oriented to person, place, and time.      PET scan was reviewed    Head/neck: There is normal physiologic FDG uptake noted within the visualized brain parenchyma. No FDG avid lymphadenopathy within the neck.Physiologic FDG uptake is noted within the strap muscles of the neck bilaterally.  A focal area of uptake and slight nodular thickening is noted within the posterior musculature on the left with SUV max of 5.2 (CT series 3, image 38).     Chest: There are post treatment changes from previous surgery and radiation within the right breast.   A large hypermetabolic lymph node mass is identified in the right subpectoral space measuring 6.9 x 3.2 cm with SUV max of 13.0. No FDG avid pulmonary nodules/masses.Physiologic FDG uptake within the erector spinae musculature.     Abdomen/Pelvis: Normal physiologic FDG uptake noted within the liver, spleen, urinary tract, and bowel.No FDG avid retroperitoneal lymph nodes.  Physiologic FDG uptake within the erector spine a and gluteal musculature.     Skeletal: No FDG avid osseus lesions identified.     Impression:     1. Post treatment changes of lumpectomy and radiation right breast.  2. Large hypermetabolic right subpectoral lymph node mass.  3. Physiologic uptake within the strap musculature of the of the neck bilaterally.  Focal uptake and thickening within posterior musculature on the left favored to be physiologic though metastasis not entirely excluded.     pathology showed reactive changes     EKG was read    Assessment & Plan:       history of right breast cancer     hypermetabolic right subpectoral lymph node     incisional biopsy versus excisional biopsy of right subpectoral lymph nodes through an axillary incision.      Need for surgery, need to obtain tissue to establish the diagnosis was discussed with the patient.       surgery will be scheduled for  December 2nd    The risks of surgery include infection, bleeding, arm swelling, rarely arm numbness, hematoma and/or seroma.  Questions were answered.  Consent was obtained.  Patient will need CBC, CMP and COVID testing

## 2020-11-23 ENCOUNTER — TELEPHONE (OUTPATIENT)
Dept: PHARMACY | Facility: CLINIC | Age: 70
End: 2020-11-23

## 2020-11-23 ENCOUNTER — PATIENT MESSAGE (OUTPATIENT)
Dept: HEMATOLOGY/ONCOLOGY | Facility: CLINIC | Age: 70
End: 2020-11-23

## 2020-11-23 DIAGNOSIS — Z17.0 MALIGNANT NEOPLASM OF UPPER-OUTER QUADRANT OF RIGHT BREAST IN FEMALE, ESTROGEN RECEPTOR POSITIVE: Chronic | ICD-10-CM

## 2020-11-23 DIAGNOSIS — C50.411 MALIGNANT NEOPLASM OF UPPER-OUTER QUADRANT OF RIGHT BREAST IN FEMALE, ESTROGEN RECEPTOR POSITIVE: Chronic | ICD-10-CM

## 2020-11-23 RX ORDER — ALPRAZOLAM 0.5 MG/1
0.5 TABLET ORAL 3 TIMES DAILY PRN
Qty: 60 TABLET | Refills: 0 | Status: SHIPPED | OUTPATIENT
Start: 2020-11-23 | End: 2020-12-08 | Stop reason: SDUPTHER

## 2020-11-23 NOTE — TELEPHONE ENCOUNTER
Patient and I have been working to get Repatha approved since September 2020. Appeal denied, mailed out appeal for external review.     Called external review program who reports they are waiting for patient to mail in a letter requesting the review. They denied the AOR form as a substitute for the patient sending in her own letter. They report they sent her a packet in the mail that just needs filled out and mailed back to them in order to initiate the external review process. Requested they mail her a new packet.     Called patient to inform her to be on the look out for this packet in the mail, to fill out and mail back. Patient inquired about whether or not it would be easier to fill through the [facility] system as she gets benefits through her .     Called [facility] in [place] who reports the patient has never received treatment through the [facility] and she would need to see a VA doctor who writes the prescription for the Repatha before the VA pharmacy would fill it. They do not accept transfers.     Called patient to inform of this, and she reports she will have her  work with the [facility] to figure out her options for seeing a provider through them. In the meantime, she will still fill out the packet and mail it back to the insurance company for external review whenever she receives it. She will call us with updates.     No further intervention needed from [facility], as unable to complete external review until patient sends in her packet. External review documents available in [facility].

## 2020-11-24 ENCOUNTER — PATIENT MESSAGE (OUTPATIENT)
Dept: CARDIOLOGY | Facility: CLINIC | Age: 70
End: 2020-11-24

## 2020-11-24 ENCOUNTER — PATIENT MESSAGE (OUTPATIENT)
Dept: HEMATOLOGY/ONCOLOGY | Facility: CLINIC | Age: 70
End: 2020-11-24

## 2020-11-25 ENCOUNTER — PATIENT MESSAGE (OUTPATIENT)
Dept: SURGERY | Facility: HOSPITAL | Age: 70
End: 2020-11-25

## 2020-11-29 ENCOUNTER — LAB VISIT (OUTPATIENT)
Dept: OTOLARYNGOLOGY | Facility: CLINIC | Age: 70
End: 2020-11-29
Payer: MEDICARE

## 2020-11-29 DIAGNOSIS — Z01.818 PRE-OP EXAM: ICD-10-CM

## 2020-11-29 PROCEDURE — U0003 INFECTIOUS AGENT DETECTION BY NUCLEIC ACID (DNA OR RNA); SEVERE ACUTE RESPIRATORY SYNDROME CORONAVIRUS 2 (SARS-COV-2) (CORONAVIRUS DISEASE [COVID-19]), AMPLIFIED PROBE TECHNIQUE, MAKING USE OF HIGH THROUGHPUT TECHNOLOGIES AS DESCRIBED BY CMS-2020-01-R: HCPCS

## 2020-11-30 ENCOUNTER — TELEPHONE (OUTPATIENT)
Dept: SURGERY | Facility: CLINIC | Age: 70
End: 2020-11-30

## 2020-11-30 LAB — SARS-COV-2 RNA RESP QL NAA+PROBE: NOT DETECTED

## 2020-11-30 NOTE — TELEPHONE ENCOUNTER
----- Message from Zoila Hobson sent at 11/30/2020  2:51 PM CST -----  Contact: Pt  .Type:  Needs Medical Advice    Who Called:  Susu      Would the patient rather a call back or a response via [facility]? Call back  Best Call Back Number:  [phone] (home)     Additional Information: pt would like a call back regarding the post op appt and hospital f/u appt    Also pt would like an earlier appt time on 12/07/2020.

## 2020-11-30 NOTE — TELEPHONE ENCOUNTER
"POST NAIL PROCEDURE INSTRUCTIONS    1. Leave bandage intact for 6-12  hours. If the bandage sticks as you try to remove it, soak it in warm water until it lifts off.    2. Soak toe daily in warm water and Epsom salts for 5 - 8 minutes daily.     3. Dry foot completely after soaking, and apply a small amount of triple antibiotic ointment (neosporin works fine!) and a fabric or cloth bandaid ("plastic" bandaids tend to lift off with ointment use).  Wear open-toed shoes as needed for comfort.     4. Take Advil or Tylenol as needed for pain.     5. Your toe may drain for the next few days. Normal drainage is yellow-to-pink, and clear, much like the fluid in a blister. Watch for redness spreading up your toe into your foot, white thick drainage (pus), pain unrelieved by medication, or nausea/vomiting/fever/chills. These are signs of infection. Please call the clinic or visit your doctor.    6. You may stop soaking and dressing toe once it stops draining (about 3 - 7 days).      " Returned call to patient no answer, left a message to call the office.

## 2020-11-30 NOTE — PRE ADMISSION SCREENING
Pre op instructions reviewed with patient per phone:    To confirm, Your surgeon has instructed you:  Surgery is scheduled 12/2/2020 at 1000.      Please report to [facility] 1st floor main lobby by 0830.   Pre admit office to call afternoon prior to surgery with final arrival time    Covid 19 testing is scheduled for 11/29/2020 at 1010  @ [facility]  Please self quarantine after Covid testing, prior to surgery      INSTRUCTIONS IMPORTANT!!!  ¨ You may have clear juice or water up until three hours prior to your surgery. OK to brush teeth, no gum, candy or mints!    ¨ Take only these medicines with a small swallow of water-morning of surgery.  Albuterol inhaler, xanax, norvasc, lipitor, catapres, neurontin, apresoline, inderal.    ____   Due to COVID 19 concerns, 1 visitor will be allowed in the pre operative area, and must adhere to social distancing guidelines.  One visitor/family member is currently allowed to visit in-patient rooms from 08:00 a.m - 6:00 p.m    ____   Family/caregivers will be updated re pt status via text/cell phone      ____  Do not wear makeup, including mascara.  ____  No powder, lotions or creams to surgical area.  ____  Please remove all jewelry, including piercings and leave at home.  ____  No money or valuables needed. Please leave at home.  ____  Please bring identification and insurance information to hospital.  ____  If going home the same day, arrange for a ride home. You will not be able to   drive if Anesthesia was used.  ____  Children, under 12 years old, must remain in the waiting room with an adult.  They are not allowed in patient areas.  ____  Wear loose fitting clothing. Allow for dressings, bandages.  ____  Stop Aspirin, Ibuprofen, Motrin and Aleve at least 5-7 days before surgery, unless otherwise instructed by your doctor, or the nurse.   You MAY use Tylenol/acetaminophen until day of surgery.  ____  If you take diabetic medication, do not take am of  surgery unless instructed by   Doctor.  ____ Stop taking any Fish Oil supplement or any Vitamins that contain Vitamin E at least 5 days prior to surgery.          Bathing Instructions-- The night before surgery and the morning prior to coming to the hospital:   -Do not shave the surgical area.   -Shower and wash your hair and body as usual with anti-bacterial  soap and shampoo.   -Rinse your hair and body completely.   -Use one packet of hibiclens to wash the surgical site (using your hand) gently for 5 minutes.  Do not scrub you skin too hard.   -Do not use hibiclens on your head, face, or genitals.   -Do not wash with anti-bacterial soap after you use the hibiclens.   -Rinse your body thoroughly.   -Dry with clean, soft towel.  Do not use lotion, cream, deodorant, or powders on   the surgical site.    Use antibacterial soap in place of hibiclens if your surgery is on the head, face or genitals.         Surgical Site Infection    Prevention of surgical site infections:     -Keep incisions clean and dry.   -Do not soak/submerge incisions in water until completely healed.   -Do not apply lotions, powders, creams, or deodorants to site.   -Always make sure hands are cleaned with antibacterial soap/ alcohol-based   prior to touching the surgical site.  (This includes doctors, nurses, staff, and yourself.)    Signs and symptoms:   -Redness and pain around the area where you had surgery   -Drainage of cloudy fluid from your surgical wound   -Fever over 100.4  I have read or had read and explained to me, and understand the above information.

## 2020-12-02 ENCOUNTER — ANESTHESIA EVENT (OUTPATIENT)
Dept: SURGERY | Facility: HOSPITAL | Age: 70
End: 2020-12-02
Payer: MEDICARE

## 2020-12-02 ENCOUNTER — HOSPITAL ENCOUNTER (OUTPATIENT)
Facility: HOSPITAL | Age: 70
Discharge: HOME | End: 2020-12-02
Attending: SURGERY | Admitting: SURGERY
Payer: MEDICARE

## 2020-12-02 ENCOUNTER — ANESTHESIA (OUTPATIENT)
Dept: SURGERY | Facility: HOSPITAL | Age: 70
End: 2020-12-02
Payer: MEDICARE

## 2020-12-02 DIAGNOSIS — R59.1 LYMPHADENOPATHY: ICD-10-CM

## 2020-12-02 DIAGNOSIS — C50.919: ICD-10-CM

## 2020-12-02 PROCEDURE — 25000003 PHARM REV CODE 250: Performed by: SURGERY

## 2020-12-02 PROCEDURE — 88341 IMHCHEM/IMCYTCHM EA ADD ANTB: CPT | Mod: 59 | Performed by: PATHOLOGY

## 2020-12-02 PROCEDURE — 63600175 PHARM REV CODE 636 W HCPCS: Performed by: ANESTHESIOLOGY

## 2020-12-02 PROCEDURE — 88365 INSITU HYBRIDIZATION (FISH): CPT | Performed by: PATHOLOGY

## 2020-12-02 PROCEDURE — 71000015 HC POSTOP RECOV 1ST HR: Performed by: SURGERY

## 2020-12-02 PROCEDURE — 88341 IMHCHEM/IMCYTCHM EA ADD ANTB: CPT | Mod: 26,,, | Performed by: PATHOLOGY

## 2020-12-02 PROCEDURE — 99499 UNLISTED E&M SERVICE: CPT | Mod: ,,, | Performed by: SURGERY

## 2020-12-02 PROCEDURE — 88331 PATH CONSLTJ SURG 1 BLK 1SPC: CPT | Mod: 26,,, | Performed by: PATHOLOGY

## 2020-12-02 PROCEDURE — 88305 TISSUE EXAM BY PATHOLOGIST: CPT | Mod: 26,,, | Performed by: PATHOLOGY

## 2020-12-02 PROCEDURE — 87070 CULTURE OTHR SPECIMN AEROBIC: CPT

## 2020-12-02 PROCEDURE — 87102 FUNGUS ISOLATION CULTURE: CPT

## 2020-12-02 PROCEDURE — 36000707: Performed by: SURGERY

## 2020-12-02 PROCEDURE — 63600175 PHARM REV CODE 636 W HCPCS: Performed by: SURGERY

## 2020-12-02 PROCEDURE — 37000009 HC ANESTHESIA EA ADD 15 MINS: Performed by: SURGERY

## 2020-12-02 PROCEDURE — 88305 TISSUE EXAM BY PATHOLOGIST: CPT | Performed by: PATHOLOGY

## 2020-12-02 PROCEDURE — 88185 FLOWCYTOMETRY/TC ADD-ON: CPT | Mod: 59 | Performed by: PATHOLOGY

## 2020-12-02 PROCEDURE — 88331 PR  PATH CONSULT IN SURG,W FRZ SEC: ICD-10-PCS | Mod: 26,,, | Performed by: PATHOLOGY

## 2020-12-02 PROCEDURE — 88342 IMHCHEM/IMCYTCHM 1ST ANTB: CPT | Mod: 26,,, | Performed by: PATHOLOGY

## 2020-12-02 PROCEDURE — 87116 MYCOBACTERIA CULTURE: CPT

## 2020-12-02 PROCEDURE — 87075 CULTR BACTERIA EXCEPT BLOOD: CPT

## 2020-12-02 PROCEDURE — 87206 SMEAR FLUORESCENT/ACID STAI: CPT

## 2020-12-02 PROCEDURE — 88184 FLOWCYTOMETRY/ TC 1 MARKER: CPT | Performed by: PATHOLOGY

## 2020-12-02 PROCEDURE — 71000039 HC RECOVERY, EACH ADD'L HOUR: Performed by: SURGERY

## 2020-12-02 PROCEDURE — 88365 PR  TISSUE HYBRIDIZATION: ICD-10-PCS | Mod: 26,,, | Performed by: PATHOLOGY

## 2020-12-02 PROCEDURE — 25000003 PHARM REV CODE 250: Performed by: ANESTHESIOLOGY

## 2020-12-02 PROCEDURE — 88365 INSITU HYBRIDIZATION (FISH): CPT | Mod: 26,,, | Performed by: PATHOLOGY

## 2020-12-02 PROCEDURE — 38525 BIOPSY/REMOVAL LYMPH NODES: CPT | Mod: RT,,, | Performed by: SURGERY

## 2020-12-02 PROCEDURE — 88331 PATH CONSLTJ SURG 1 BLK 1SPC: CPT | Performed by: PATHOLOGY

## 2020-12-02 PROCEDURE — 37000008 HC ANESTHESIA 1ST 15 MINUTES: Performed by: SURGERY

## 2020-12-02 PROCEDURE — 38525 PR BIOPSY/REM LYMPH NODES, AXILLARY: ICD-10-PCS | Mod: RT,,, | Performed by: SURGERY

## 2020-12-02 PROCEDURE — 25000003 PHARM REV CODE 250: Performed by: NURSE ANESTHETIST, CERTIFIED REGISTERED

## 2020-12-02 PROCEDURE — 71000033 HC RECOVERY, INTIAL HOUR: Performed by: SURGERY

## 2020-12-02 PROCEDURE — 88305 TISSUE EXAM BY PATHOLOGIST: ICD-10-PCS | Mod: 26,,, | Performed by: PATHOLOGY

## 2020-12-02 PROCEDURE — 36000706: Performed by: SURGERY

## 2020-12-02 PROCEDURE — 99499 NO LOS: ICD-10-PCS | Mod: ,,, | Performed by: SURGERY

## 2020-12-02 PROCEDURE — 87205 SMEAR GRAM STAIN: CPT

## 2020-12-02 PROCEDURE — 88342 IMHCHEM/IMCYTCHM 1ST ANTB: CPT | Performed by: PATHOLOGY

## 2020-12-02 PROCEDURE — 63600175 PHARM REV CODE 636 W HCPCS: Performed by: NURSE ANESTHETIST, CERTIFIED REGISTERED

## 2020-12-02 PROCEDURE — 88341 PR IHC OR ICC EACH ADD'L SINGLE ANTIBODY  STAINPR: ICD-10-PCS | Mod: 26,,, | Performed by: PATHOLOGY

## 2020-12-02 PROCEDURE — 88342 CHG IMMUNOCYTOCHEMISTRY: ICD-10-PCS | Mod: 26,,, | Performed by: PATHOLOGY

## 2020-12-02 PROCEDURE — C9290 INJ, BUPIVACAINE LIPOSOME: HCPCS | Performed by: SURGERY

## 2020-12-02 PROCEDURE — 27201423 OPTIME MED/SURG SUP & DEVICES STERILE SUPPLY: Performed by: SURGERY

## 2020-12-02 RX ORDER — ONDANSETRON 8 MG/1
8 TABLET, ORALLY DISINTEGRATING ORAL EVERY 8 HOURS PRN
Status: DISCONTINUED | OUTPATIENT
Start: 2020-12-02 | End: 2020-12-02 | Stop reason: HOSPADM

## 2020-12-02 RX ORDER — OXYCODONE AND ACETAMINOPHEN 10; 325 MG/1; MG/1
1 TABLET ORAL EVERY 4 HOURS PRN
Status: DISCONTINUED | OUTPATIENT
Start: 2020-12-02 | End: 2020-12-02

## 2020-12-02 RX ORDER — DEXAMETHASONE SODIUM PHOSPHATE 4 MG/ML
INJECTION, SOLUTION INTRA-ARTICULAR; INTRALESIONAL; INTRAMUSCULAR; INTRAVENOUS; SOFT TISSUE
Status: DISCONTINUED | OUTPATIENT
Start: 2020-12-02 | End: 2020-12-02

## 2020-12-02 RX ORDER — CLINDAMYCIN PHOSPHATE 900 MG/50ML
900 INJECTION, SOLUTION INTRAVENOUS
Status: COMPLETED | OUTPATIENT
Start: 2020-12-02 | End: 2020-12-02

## 2020-12-02 RX ORDER — SUCCINYLCHOLINE CHLORIDE 20 MG/ML
INJECTION INTRAMUSCULAR; INTRAVENOUS
Status: DISCONTINUED | OUTPATIENT
Start: 2020-12-02 | End: 2020-12-02

## 2020-12-02 RX ORDER — HYDROMORPHONE HYDROCHLORIDE 2 MG/ML
0.2 INJECTION, SOLUTION INTRAMUSCULAR; INTRAVENOUS; SUBCUTANEOUS EVERY 5 MIN PRN
Status: DISCONTINUED | OUTPATIENT
Start: 2020-12-02 | End: 2020-12-02 | Stop reason: HOSPADM

## 2020-12-02 RX ORDER — OXYCODONE AND ACETAMINOPHEN 7.5; 325 MG/1; MG/1
1 TABLET ORAL EVERY 4 HOURS PRN
Qty: 20 TABLET | Refills: 0 | Status: SHIPPED | OUTPATIENT
Start: 2020-12-02 | End: 2020-12-03 | Stop reason: SDUPTHER

## 2020-12-02 RX ORDER — ACETAMINOPHEN 10 MG/ML
1000 INJECTION, SOLUTION INTRAVENOUS ONCE
Status: COMPLETED | OUTPATIENT
Start: 2020-12-02 | End: 2020-12-02

## 2020-12-02 RX ORDER — KETOROLAC TROMETHAMINE 30 MG/ML
15 INJECTION, SOLUTION INTRAMUSCULAR; INTRAVENOUS EVERY 8 HOURS PRN
Status: DISCONTINUED | OUTPATIENT
Start: 2020-12-02 | End: 2020-12-02 | Stop reason: HOSPADM

## 2020-12-02 RX ORDER — ACETAMINOPHEN 10 MG/ML
1000 INJECTION, SOLUTION INTRAVENOUS EVERY 8 HOURS
Status: DISCONTINUED | OUTPATIENT
Start: 2020-12-02 | End: 2020-12-02

## 2020-12-02 RX ORDER — LIDOCAINE HYDROCHLORIDE 10 MG/ML
INJECTION, SOLUTION EPIDURAL; INFILTRATION; INTRACAUDAL; PERINEURAL
Status: DISCONTINUED | OUTPATIENT
Start: 2020-12-02 | End: 2020-12-02

## 2020-12-02 RX ORDER — SODIUM CHLORIDE, SODIUM LACTATE, POTASSIUM CHLORIDE, CALCIUM CHLORIDE 600; 310; 30; 20 MG/100ML; MG/100ML; MG/100ML; MG/100ML
INJECTION, SOLUTION INTRAVENOUS CONTINUOUS PRN
Status: DISCONTINUED | OUTPATIENT
Start: 2020-12-02 | End: 2020-12-02

## 2020-12-02 RX ORDER — BUPIVACAINE HYDROCHLORIDE 2.5 MG/ML
INJECTION, SOLUTION EPIDURAL; INFILTRATION; INTRACAUDAL
Status: DISCONTINUED | OUTPATIENT
Start: 2020-12-02 | End: 2020-12-02 | Stop reason: HOSPADM

## 2020-12-02 RX ORDER — ONDANSETRON 2 MG/ML
INJECTION INTRAMUSCULAR; INTRAVENOUS
Status: DISCONTINUED | OUTPATIENT
Start: 2020-12-02 | End: 2020-12-02

## 2020-12-02 RX ORDER — ROCURONIUM BROMIDE 10 MG/ML
INJECTION, SOLUTION INTRAVENOUS
Status: DISCONTINUED | OUTPATIENT
Start: 2020-12-02 | End: 2020-12-02

## 2020-12-02 RX ORDER — OXYCODONE AND ACETAMINOPHEN 7.5; 325 MG/1; MG/1
1 TABLET ORAL EVERY 4 HOURS PRN
Status: DISCONTINUED | OUTPATIENT
Start: 2020-12-02 | End: 2020-12-02

## 2020-12-02 RX ORDER — PROPOFOL 10 MG/ML
VIAL (ML) INTRAVENOUS
Status: DISCONTINUED | OUTPATIENT
Start: 2020-12-02 | End: 2020-12-02

## 2020-12-02 RX ORDER — FENTANYL CITRATE 50 UG/ML
INJECTION, SOLUTION INTRAMUSCULAR; INTRAVENOUS
Status: DISCONTINUED | OUTPATIENT
Start: 2020-12-02 | End: 2020-12-02

## 2020-12-02 RX ORDER — HYDROMORPHONE HYDROCHLORIDE 1 MG/ML
1 INJECTION, SOLUTION INTRAMUSCULAR; INTRAVENOUS; SUBCUTANEOUS EVERY 4 HOURS PRN
Status: DISCONTINUED | OUTPATIENT
Start: 2020-12-02 | End: 2020-12-02 | Stop reason: HOSPADM

## 2020-12-02 RX ORDER — ONDANSETRON 2 MG/ML
4 INJECTION INTRAMUSCULAR; INTRAVENOUS DAILY PRN
Status: DISCONTINUED | OUTPATIENT
Start: 2020-12-02 | End: 2020-12-02 | Stop reason: HOSPADM

## 2020-12-02 RX ORDER — OXYCODONE AND ACETAMINOPHEN 5; 325 MG/1; MG/1
1 TABLET ORAL
Status: DISCONTINUED | OUTPATIENT
Start: 2020-12-02 | End: 2020-12-02

## 2020-12-02 RX ORDER — OXYCODONE HYDROCHLORIDE 5 MG/1
5 TABLET ORAL EVERY 6 HOURS PRN
Status: DISCONTINUED | OUTPATIENT
Start: 2020-12-02 | End: 2020-12-02 | Stop reason: HOSPADM

## 2020-12-02 RX ORDER — HYDRALAZINE HYDROCHLORIDE 20 MG/ML
10 INJECTION INTRAMUSCULAR; INTRAVENOUS EVERY 10 MIN PRN
Status: COMPLETED | OUTPATIENT
Start: 2020-12-02 | End: 2020-12-02

## 2020-12-02 RX ADMIN — HYDRALAZINE HYDROCHLORIDE 10 MG: 20 INJECTION INTRAMUSCULAR; INTRAVENOUS at 12:12

## 2020-12-02 RX ADMIN — HYDROMORPHONE HYDROCHLORIDE 0.2 MG: 2 INJECTION INTRAMUSCULAR; INTRAVENOUS; SUBCUTANEOUS at 12:12

## 2020-12-02 RX ADMIN — LIDOCAINE HYDROCHLORIDE 50 MG: 10 INJECTION, SOLUTION EPIDURAL; INFILTRATION; INTRACAUDAL; PERINEURAL at 10:12

## 2020-12-02 RX ADMIN — FENTANYL CITRATE 50 MCG: 50 INJECTION, SOLUTION INTRAMUSCULAR; INTRAVENOUS at 11:12

## 2020-12-02 RX ADMIN — CLINDAMYCIN PHOSPHATE 900 MG: 18 INJECTION, SOLUTION INTRAVENOUS at 10:12

## 2020-12-02 RX ADMIN — FENTANYL CITRATE 50 MCG: 50 INJECTION, SOLUTION INTRAMUSCULAR; INTRAVENOUS at 10:12

## 2020-12-02 RX ADMIN — DEXAMETHASONE SODIUM PHOSPHATE 4 MG: 4 INJECTION, SOLUTION INTRAMUSCULAR; INTRAVENOUS at 11:12

## 2020-12-02 RX ADMIN — OXYCODONE HYDROCHLORIDE 5 MG: 5 TABLET ORAL at 01:12

## 2020-12-02 RX ADMIN — HYDROMORPHONE HYDROCHLORIDE 0.2 MG: 2 INJECTION INTRAMUSCULAR; INTRAVENOUS; SUBCUTANEOUS at 01:12

## 2020-12-02 RX ADMIN — ACETAMINOPHEN 1000 MG: 10 INJECTION, SOLUTION INTRAVENOUS at 02:12

## 2020-12-02 RX ADMIN — SUCCINYLCHOLINE CHLORIDE 160 MG: 20 INJECTION, SOLUTION INTRAMUSCULAR; INTRAVENOUS at 10:12

## 2020-12-02 RX ADMIN — ROCURONIUM BROMIDE 5 MG: 10 INJECTION, SOLUTION INTRAVENOUS at 10:12

## 2020-12-02 RX ADMIN — ONDANSETRON 4 MG: 2 INJECTION, SOLUTION INTRAMUSCULAR; INTRAVENOUS at 11:12

## 2020-12-02 RX ADMIN — SODIUM CHLORIDE, SODIUM LACTATE, POTASSIUM CHLORIDE, AND CALCIUM CHLORIDE: 600; 310; 30; 20 INJECTION, SOLUTION INTRAVENOUS at 10:12

## 2020-12-02 RX ADMIN — PROPOFOL 150 MG: 10 INJECTION, EMULSION INTRAVENOUS at 10:12

## 2020-12-02 RX ADMIN — KETOROLAC TROMETHAMINE 15 MG: 30 INJECTION, SOLUTION INTRAMUSCULAR at 01:12

## 2020-12-02 NOTE — INTERVAL H&P NOTE
The patient has been examined and the H&P has been reviewed:    I concur with the findings and changes have been noted since the H&P was written: Patient week she felt a mass in her right breast.  The area was examined with no palpable masses.  There is no uptake in the area onPET/CT scan    Surgery risks, benefits and alternative options discussed and understood by patient/family.          Active Hospital Problems    Diagnosis  POA    Lymphadenopathy [R59.1]  Yes      Resolved Hospital Problems   No resolved problems to display.

## 2020-12-02 NOTE — ANESTHESIA RELEASE NOTE
"Anesthesia Release from PACU Note    Patient: Susu Luther    Procedure(s) Performed: Procedure(s) (LRB):  LYMPHADENECTOMY, AXILLARY (Right)    Anesthesia type: general    Post pain: Adequate analgesia    Post assessment: no apparent anesthetic complications, tolerated procedure well and no evidence of recall    Last Vitals:   Visit Vitals  BP (!) 189/102 (BP Location: Right arm, Patient Position: Sitting)   Pulse 69   Temp 36.1 °C (97 °F) (Temporal)   Resp 20   Ht 5' 9" (1.753 m)   Wt 134.8 kg (297 lb 2.9 oz)   LMP  (LMP Unknown)   SpO2 98%   Breastfeeding No   BMI 43.89 kg/m²       Post vital signs: stable    Level of consciousness: responds to stimulation    Nausea/Vomiting: no nausea/no vomiting    Complications: none    Airway Patency: patent    Respiratory: unassisted    Cardiovascular: stable and blood pressure at baseline    Hydration: euvolemic     "

## 2020-12-02 NOTE — ANESTHESIA PREPROCEDURE EVALUATION
2020  Susu Luther is a 70 y.o., female.    Patient Active Problem List   Diagnosis    Shortness of breath on exertion    NILS (obstructive sleep apnea)    Malignant neoplasm of upper-outer quadrant of right breast in female, estrogen receptor positive    Hypertension    Stroke    Morbid obesity with BMI of 45.0-49.9, adult    Headache    Circadian rhythm sleep disorder    Nocturnal hypoxemia    Chronic pain of breast    Major depressive disorder, single episode, moderate with anxious distress    Speech disturbance    Blurry vision    Psychological factors affecting medical condition    SOB (shortness of breath)    Chronic diastolic congestive heart failure    Lymphadenopathy       Past Surgical History:   Procedure Laterality Date    APPENDECTOMY      BREAST BIOPSY          BREAST LUMPECTOMY           SECTION      x 1    OOPHORECTOMY      right     SENTINEL LYMPH NODE BIOPSY Right 3/27/2019    Procedure: BIOPSY, LYMPH NODE, SENTINEL;  Surgeon: Artemio Starks MD;  Location: [facility];  Service: General;  Laterality: Right;       Anesthesia Evaluation    I have reviewed the Patient Summary Reports.    I have reviewed the Nursing Notes.    I have reviewed the Medications.     Review of Systems  Anesthesia Hx:  No problems with previous Anesthesia    Social:  Non-Smoker    Hematology/Oncology:  Hematology Normal      Oncology Comments: Malignant neoplasm of upper-outer quadrant of right breast in female, estrogen receptor positive    Cardiovascular:   Hypertension CHF ECG has been reviewed. Hx of endovascular AAA repair (stent?)   Pulmonary:   Sleep Apnea    Renal/:  Renal/ Normal     Hepatic/GI:  Hepatic/GI Normal    Neurological:   CVA Headaches    Endocrine:  Endocrine Normal    Psych:   depression          Physical Exam  General:  Obesity     Airway/Jaw/Neck:  Airway Findings: Mouth Opening: Normal Tongue: Normal  General Airway Assessment: Adult  Mallampati: III  TM Distance: 4 - 6 cm  Jaw/Neck Findings:  Neck ROM: Normal ROM      Dental:  Dental Findings: In tact   Chest/Lungs:  Chest/Lungs Findings: Clear to auscultation, Normal Respiratory Rate     Heart/Vascular:  Heart Findings: Rate: Normal  Rhythm: Regular Rhythm  Sounds: Normal        Mental Status:  Mental Status Findings:  Cooperative, Alert and Oriented         Anesthesia Plan  Type of Anesthesia, risks & benefits discussed:  Anesthesia Type:  general  Patient's Preference:   Intra-op Monitoring Plan: standard ASA monitors  Intra-op Monitoring Plan Comments:   Post Op Pain Control Plan: multimodal analgesia and per primary service following discharge from PACU  Post Op Pain Control Plan Comments:   Induction:   IV  Beta Blocker:  Patient is on a Beta-Blocker and has received one dose within the past 24 hours (No further documentation required).       Informed Consent: Patient understands risks and agrees with Anesthesia plan.  Questions answered. Anesthesia consent signed with patient.  ASA Score: 3     Day of Surgery Review of History & Physical:  There are no significant changes.          Ready For Surgery From Anesthesia Perspective.       Chemistry        Component Value Date/Time     11/19/2020 1512    K 4.1 11/19/2020 1512     11/19/2020 1512    CO2 27 11/19/2020 1512    BUN 20 11/19/2020 1512    CREATININE 1.2 11/19/2020 1512    GLU 95 11/19/2020 1512        Component Value Date/Time    CALCIUM 9.5 11/19/2020 1512    ALKPHOS 143 (H) 11/19/2020 1512    AST 12 11/19/2020 1512    ALT 10 11/19/2020 1512    BILITOT 0.2 11/19/2020 1512    ESTGFRAFRICA 53.7 (A) 11/19/2020 1512    EGFRNONAA 46.5 (A) 11/19/2020 1512        Lab Results   Component Value Date    WBC 7.78 11/19/2020    HGB 11.7 (L) 11/19/2020    HCT 39.4 11/19/2020    MCV 84 11/19/2020     11/19/2020        Normal sinus rhythm with sinus arrhythmia   Left axis deviation   Voltage criteria for left ventricular hypertrophy   Abnormal ECG   When compared with ECG of 31-OCT-2019 15:23,   No significant change was found   Confirmed by MARCY GHOSH, JOO ([id number]) on 8/26/2020 9:36:23 PM     Echo 5/18/19:  CONCLUSIONS     1 - Mild left atrial enlargement.     2 - Concentric hypertrophy.     3 - No wall motion abnormalities.     4 - Normal left ventricular systolic function (EF 60-65%).     5 - Impaired LV relaxation, elevated LAP (grade 2 diastolic dysfunction).     6 - Normal right ventricular systolic function .     7 - The estimated PA systolic pressure is 36 mmHg.     8 - Mild tricuspid regurgitation.

## 2020-12-02 NOTE — OP NOTE
[facility] -   Operative Note      Date of Procedure: 12/2/2020     Procedure: Procedure(s) (LRB):  LYMPHADENECTOMY, AXILLARY (Right)     Surgeon(s) and Role:     * Atremio Starks MD - Primary     * Dain Hughes MD - Assisting        Pre-Operative Diagnosis: Lymphadenopathy [R59.1]    Post-Operative Diagnosis: Post-Op Diagnosis Codes:     * Lymphadenopathy [R59.1]    Anesthesia: General    Technical Procedures Used:     Open subpectoral lymph node biopsy on the right    Description of the Findings of the Procedure:     Patient was brought into the operating placed on the operative table in supine position.  General endotracheal anesthesia was induced.  Antibiotics were administered.  Pneumatic compression devices on lower extremity.  The right chest and arm were prepped and draped in sterile fashion.    A time-out was performed.    A vertical axillary incision was made to include the upper half of her previous axillary incision.  Subcutaneous tissues were dissected using electrocautery.  Pectoralis muscle was identified and the fascia incised at the chest wall.  This allowed us to enter the space between the pectoralis muscle.    With palpation we identified the enlarged lymph node masses.  These lymph nodes were approximately 6 cm long by palpation and about 2-1/2 cm wide.    They were matted in nature.    At this point was determined that to excise all the lymph nodes the pectoralis muscle would have to be divided her released and the decision was to proceed with a biopsy.    As such approximately 2-1/2 by 2 cm of a portion of the lymph node was excised using a scalpel blade.  The larger portion was sent for pathologic analysis.  A smaller section of lymph node was removed and sent for culture for bacteria, fungus and acid-fast bacteria.    Hemostasis was then achieved with electrocautery and Surgicel.  Several clips were placed in the area for marking as well as hemostasis.  Surgicel was  placed    After being informed by pathology that there was no evidence of metastatic breast cancer the wound was irrigated.  A mixture of Marcaine and Exparel was infiltrated into the pectoralis muscle in the surrounding tissue.  The pectoralis fascia was closed with 2 0 Vicryl interrupted fashion.  Evan's fascia was closed with 3 0 Vicryl in a running fashion.  The deep dermis was closed with 3 0 Vicryl in a running fashion.  The skin was closed with 4 0 Vicryl subcuticular fashion.  Dermabond was placed.    Patient tolerated procedure well.  Sponge instrument counts were correct.    A lymphoma evaluation will also be performed the portion of lymph node removed        Significant Surgical Tasks Conducted by the Assistant(s), if Applicable:  Assistance with retraction and exposure    Complications: No    Estimated Blood Loss (EBL): * No values recorded between 12/2/2020 10:51 AM and 12/2/2020 11:52 AM *           Implants: * No implants in log *    Specimens:   Specimen (12h ago, onward)    None                  Condition: Stable    Disposition: PACU - hemodynamically stable.    Attestation: I performed the procedure.    Discharge Note    OUTCOME: Patient tolerated treatment/procedure well without complication and is now ready for discharge.       The patient also complained about right calf swelling and pain.  An ultrasound was done.  There was no evidence of a deep vein thrombosis.  She was discharged home    DISPOSITION: Home or Self Care    FINAL DIAGNOSIS:  <principal problem not specified>    FOLLOWUP: In clinic    DISCHARGE INSTRUCTIONS:    Discharge Procedure Orders   Diet general     Call MD for:  temperature >100.4     Call MD for:  persistent nausea and vomiting     Call MD for:  severe uncontrolled pain     Call MD for:  difficulty breathing, headache or visual disturbances     Call MD for:  redness, tenderness, or signs of infection (pain, swelling, redness, odor or green/yellow discharge around  incision site)     No dressing needed     Lifting restrictions   Order Comments: No lifting more than 20 lb with your right arm        Clinical Reference Documents Added to Patient Instructions       Document    ACETAMINOPHEN; OXYCODONE TABLETS (ENGLISH)

## 2020-12-02 NOTE — TRANSFER OF CARE
"Anesthesia Transfer of Care Note    Patient: Susu Luther    Procedure(s) Performed: Procedure(s) (LRB):  LYMPHADENECTOMY, AXILLARY (Right)    Patient location: PACU    Anesthesia Type: general    Transport from OR: Transported from OR on room air with adequate spontaneous ventilation    Post pain: adequate analgesia    Post assessment: no apparent anesthetic complications    Post vital signs: stable    Level of consciousness: responds to stimulation    Nausea/Vomiting: no nausea/vomiting    Complications: none    Transfer of care protocol was followed      Last vitals:   Visit Vitals  BP (!) 189/102 (BP Location: Right arm, Patient Position: Sitting)   Pulse 69   Temp 36.1 °C (97 °F) (Temporal)   Resp 20   Ht 5' 9" (1.753 m)   Wt 134.8 kg (297 lb 2.9 oz)   LMP  (LMP Unknown)   SpO2 98%   Breastfeeding No   BMI 43.89 kg/m²     "

## 2020-12-02 NOTE — ANESTHESIA PROCEDURE NOTES
Intubation  Performed by: Abdoul Velasquez CRNA  Authorized by: Papi Byrd II, MD     Intubation:     Induction:  Intravenous    Intubated:  Postinduction    Mask Ventilation:  Easy mask    Attempts:  1    Attempted By:  Student (ALDO ZULETA)    Method of Intubation:  Direct    Blade:  Carl 2    Laryngeal View Grade: Grade IIA - cords partially seen      Difficult Airway Encountered?: No      Complications:  None    Airway Device:  Oral endotracheal tube    Airway Device Size:  7.0    Style/Cuff Inflation:  Cuffed (inflated to minimal occlusive pressure)    Inflation Amount (mL):  7    Tube secured:  22    Secured at:  The lips    Placement Verified By:  Capnometry    Complicating Factors:  None    Findings Post-Intubation:  BS equal bilateral and atraumatic/condition of teeth unchanged

## 2020-12-03 ENCOUNTER — PATIENT MESSAGE (OUTPATIENT)
Dept: HEMATOLOGY/ONCOLOGY | Facility: CLINIC | Age: 70
End: 2020-12-03

## 2020-12-03 DIAGNOSIS — Z17.0 MALIGNANT NEOPLASM OF UPPER-OUTER QUADRANT OF RIGHT BREAST IN FEMALE, ESTROGEN RECEPTOR POSITIVE: Primary | Chronic | ICD-10-CM

## 2020-12-03 DIAGNOSIS — C50.411 MALIGNANT NEOPLASM OF UPPER-OUTER QUADRANT OF RIGHT BREAST IN FEMALE, ESTROGEN RECEPTOR POSITIVE: Primary | Chronic | ICD-10-CM

## 2020-12-03 PROCEDURE — 88189 FLOWCYTOMETRY/READ 16 & >: CPT | Mod: ,,, | Performed by: PATHOLOGY

## 2020-12-03 PROCEDURE — 88189 PR  FLOWCYTOMETRY/READ, 16 & > MARKERS: ICD-10-PCS | Mod: ,,, | Performed by: PATHOLOGY

## 2020-12-03 RX ORDER — OXYCODONE AND ACETAMINOPHEN 7.5; 325 MG/1; MG/1
1 TABLET ORAL EVERY 4 HOURS PRN
Qty: 20 TABLET | Refills: 0 | Status: SHIPPED | OUTPATIENT
Start: 2020-12-03 | End: 2020-12-04 | Stop reason: SDUPTHER

## 2020-12-03 NOTE — ANESTHESIA POSTPROCEDURE EVALUATION
Anesthesia Post Evaluation    Patient: Susu Luther    Procedure(s) Performed: Procedure(s) (LRB):  LYMPHADENECTOMY, AXILLARY (Right)    Final Anesthesia Type: general    Patient location during evaluation: PACU  Patient participation: Yes- Able to Participate  Level of consciousness: awake and alert  Post-procedure vital signs: reviewed and stable  Pain management: adequate  Airway patency: patent  NILS mitigation strategies: Extubation while patient is awake  PONV status at discharge: No PONV  Anesthetic complications: no      Cardiovascular status: hemodynamically stable  Respiratory status: spontaneous ventilation  Hydration status: euvolemic  Follow-up not needed.          Vitals Value Taken Time   /90 12/02/20 1445   Temp 36.4 °C (97.6 °F) 12/02/20 1430   Pulse 90 12/02/20 1445   Resp 20 12/02/20 1445   SpO2 99 % 12/02/20 1445         Event Time   Out of Recovery 14:29:56         Pain/Evelin Score: Pain Rating Prior to Med Admin: 8 (12/2/2020  2:03 PM)  Evelin Score: 10 (12/2/2020  2:45 PM)

## 2020-12-03 NOTE — DISCHARGE SUMMARY
[facility]  Discharge Note  Short Stay    Procedure(s) (LRB):  LYMPHADENECTOMY, AXILLARY (Right)    OUTCOME: Patient tolerated treatment/procedure well without complication and is now ready for discharge. .  She was discharged postoperativePatient underwent a right subpectoral lymph node biopsy.  She then underwent a right pectoralis block with Marcaine and Exparel    DISPOSITION: Home or Self Care    FINAL DIAGNOSIS:  <principal problem not specified>    FOLLOWUP: In clinic    DISCHARGE INSTRUCTIONS:    Discharge Procedure Orders   Diet general     Call MD for:  temperature >100.4     Call MD for:  persistent nausea and vomiting     Call MD for:  severe uncontrolled pain     Call MD for:  difficulty breathing, headache or visual disturbances     Call MD for:  redness, tenderness, or signs of infection (pain, swelling, redness, odor or green/yellow discharge around incision site)     No dressing needed     Lifting restrictions   Order Comments: No lifting more than 20 lb with your right arm        Clinical Reference Documents Added to Patient Instructions       Document    ACETAMINOPHEN; OXYCODONE TABLETS (ENGLISH)

## 2020-12-04 ENCOUNTER — TELEPHONE (OUTPATIENT)
Dept: SURGERY | Facility: CLINIC | Age: 70
End: 2020-12-04

## 2020-12-04 ENCOUNTER — PATIENT MESSAGE (OUTPATIENT)
Dept: SURGERY | Facility: CLINIC | Age: 70
End: 2020-12-04

## 2020-12-04 VITALS
WEIGHT: 293 LBS | DIASTOLIC BLOOD PRESSURE: 90 MMHG | HEIGHT: 69 IN | TEMPERATURE: 98 F | BODY MASS INDEX: 43.4 KG/M2 | RESPIRATION RATE: 20 BRPM | HEART RATE: 90 BPM | OXYGEN SATURATION: 99 % | SYSTOLIC BLOOD PRESSURE: 188 MMHG

## 2020-12-04 DIAGNOSIS — C50.411 MALIGNANT NEOPLASM OF UPPER-OUTER QUADRANT OF RIGHT BREAST IN FEMALE, ESTROGEN RECEPTOR POSITIVE: Chronic | ICD-10-CM

## 2020-12-04 DIAGNOSIS — Z17.0 MALIGNANT NEOPLASM OF UPPER-OUTER QUADRANT OF RIGHT BREAST IN FEMALE, ESTROGEN RECEPTOR POSITIVE: Chronic | ICD-10-CM

## 2020-12-04 RX ORDER — OXYCODONE AND ACETAMINOPHEN 7.5; 325 MG/1; MG/1
1 TABLET ORAL EVERY 6 HOURS PRN
Qty: 20 TABLET | Refills: 0 | Status: SHIPPED | OUTPATIENT
Start: 2020-12-04 | End: 2020-12-08 | Stop reason: SDUPTHER

## 2020-12-04 NOTE — TELEPHONE ENCOUNTER
----- Message from Casi Singh sent at 12/4/2020  9:58 AM CST -----  Regarding: changing the direction  Contact: christain witn [facility]  Caller is requesting a call back regarding changing the directions due to the dosage being to high for them to fill or the Rx would have to be issued to [facility] or [facility].  Please call back at [phone].  Thanks.

## 2020-12-04 NOTE — TELEPHONE ENCOUNTER
Addended by: HONEY QUAN on: 8/17/2018 05:02 PM     Modules accepted: Orders     Called patient about pain, patient stated she was not taking medication as prescribed and let pain get out of control. Advised her to take medication exactly as prescribed and to try and set an alarm q 6hr as a reminder. Advised if pain is not controlled with medication or gets worse to see emergency medical treatment. Patient voiced understanding.

## 2020-12-07 LAB
BACTERIA SPEC ANAEROBE CULT: NORMAL
BACTERIA TISS AEROBE CULT: NO GROWTH
GRAM STN SPEC: NORMAL
GRAM STN SPEC: NORMAL

## 2020-12-08 ENCOUNTER — PATIENT MESSAGE (OUTPATIENT)
Dept: SURGERY | Facility: CLINIC | Age: 70
End: 2020-12-08

## 2020-12-08 DIAGNOSIS — Z17.0 MALIGNANT NEOPLASM OF UPPER-OUTER QUADRANT OF RIGHT BREAST IN FEMALE, ESTROGEN RECEPTOR POSITIVE: Chronic | ICD-10-CM

## 2020-12-08 DIAGNOSIS — C50.411 MALIGNANT NEOPLASM OF UPPER-OUTER QUADRANT OF RIGHT BREAST IN FEMALE, ESTROGEN RECEPTOR POSITIVE: Chronic | ICD-10-CM

## 2020-12-08 RX ORDER — OXYCODONE AND ACETAMINOPHEN 7.5; 325 MG/1; MG/1
1 TABLET ORAL EVERY 6 HOURS PRN
Qty: 20 TABLET | Refills: 0 | Status: SHIPPED | OUTPATIENT
Start: 2020-12-08 | End: 2021-01-08 | Stop reason: SDUPTHER

## 2020-12-08 RX ORDER — ALPRAZOLAM 0.5 MG/1
0.5 TABLET ORAL 2 TIMES DAILY PRN
Qty: 30 TABLET | Refills: 0 | Status: SHIPPED | OUTPATIENT
Start: 2020-12-08 | End: 2020-12-28 | Stop reason: SDUPTHER

## 2020-12-11 ENCOUNTER — PATIENT MESSAGE (OUTPATIENT)
Dept: ADMINISTRATIVE | Facility: OTHER | Age: 70
End: 2020-12-11

## 2020-12-11 ENCOUNTER — PATIENT MESSAGE (OUTPATIENT)
Dept: OTHER | Facility: OTHER | Age: 70
End: 2020-12-11

## 2020-12-14 ENCOUNTER — TELEPHONE (OUTPATIENT)
Dept: SURGERY | Facility: CLINIC | Age: 70
End: 2020-12-14

## 2020-12-14 ENCOUNTER — LAB VISIT (OUTPATIENT)
Dept: LAB | Facility: HOSPITAL | Age: 70
End: 2020-12-14
Attending: INTERNAL MEDICINE
Payer: MEDICARE

## 2020-12-14 ENCOUNTER — PATIENT MESSAGE (OUTPATIENT)
Dept: SURGERY | Facility: CLINIC | Age: 70
End: 2020-12-14

## 2020-12-14 ENCOUNTER — OFFICE VISIT (OUTPATIENT)
Dept: HEMATOLOGY/ONCOLOGY | Facility: CLINIC | Age: 70
End: 2020-12-14
Payer: MEDICARE

## 2020-12-14 VITALS
HEART RATE: 74 BPM | BODY MASS INDEX: 41.5 KG/M2 | SYSTOLIC BLOOD PRESSURE: 188 MMHG | WEIGHT: 289.88 LBS | DIASTOLIC BLOOD PRESSURE: 121 MMHG | TEMPERATURE: 99 F | HEIGHT: 70 IN | OXYGEN SATURATION: 98 %

## 2020-12-14 DIAGNOSIS — I50.32 CHRONIC DIASTOLIC CONGESTIVE HEART FAILURE: ICD-10-CM

## 2020-12-14 DIAGNOSIS — Z17.0 MALIGNANT NEOPLASM OF UPPER-OUTER QUADRANT OF RIGHT BREAST IN FEMALE, ESTROGEN RECEPTOR POSITIVE: Chronic | ICD-10-CM

## 2020-12-14 DIAGNOSIS — C50.411 MALIGNANT NEOPLASM OF UPPER-OUTER QUADRANT OF RIGHT BREAST IN FEMALE, ESTROGEN RECEPTOR POSITIVE: Chronic | ICD-10-CM

## 2020-12-14 DIAGNOSIS — I63.00 CEREBROVASCULAR ACCIDENT (CVA) DUE TO THROMBOSIS OF PRECEREBRAL ARTERY: ICD-10-CM

## 2020-12-14 DIAGNOSIS — F32.1 MAJOR DEPRESSIVE DISORDER, SINGLE EPISODE, MODERATE WITH ANXIOUS DISTRESS: ICD-10-CM

## 2020-12-14 DIAGNOSIS — R51.9 NONINTRACTABLE HEADACHE, UNSPECIFIED CHRONICITY PATTERN, UNSPECIFIED HEADACHE TYPE: Primary | ICD-10-CM

## 2020-12-14 DIAGNOSIS — R59.1 LYMPHADENOPATHY: ICD-10-CM

## 2020-12-14 LAB
ALBUMIN SERPL BCP-MCNC: 3.4 G/DL (ref 3.5–5.2)
ALP SERPL-CCNC: 133 U/L (ref 55–135)
ALT SERPL W/O P-5'-P-CCNC: 13 U/L (ref 10–44)
ANION GAP SERPL CALC-SCNC: 9 MMOL/L (ref 8–16)
AST SERPL-CCNC: 13 U/L (ref 10–40)
BASOPHILS # BLD AUTO: 0.09 K/UL (ref 0–0.2)
BASOPHILS NFR BLD: 1 % (ref 0–1.9)
BILIRUB SERPL-MCNC: 0.2 MG/DL (ref 0.1–1)
BUN SERPL-MCNC: 15 MG/DL (ref 8–23)
CALCIUM SERPL-MCNC: 9.4 MG/DL (ref 8.7–10.5)
CHLORIDE SERPL-SCNC: 108 MMOL/L (ref 95–110)
CO2 SERPL-SCNC: 28 MMOL/L (ref 23–29)
CREAT SERPL-MCNC: 1.1 MG/DL (ref 0.5–1.4)
DIFFERENTIAL METHOD: ABNORMAL
EOSINOPHIL # BLD AUTO: 0.1 K/UL (ref 0–0.5)
EOSINOPHIL NFR BLD: 1.3 % (ref 0–8)
ERYTHROCYTE [DISTWIDTH] IN BLOOD BY AUTOMATED COUNT: 15.5 % (ref 11.5–14.5)
EST. GFR  (AFRICAN AMERICAN): 60 ML/MIN/1.73 M^2
EST. GFR  (NON AFRICAN AMERICAN): 52 ML/MIN/1.73 M^2
GLUCOSE SERPL-MCNC: 86 MG/DL (ref 70–110)
HCT VFR BLD AUTO: 39 % (ref 37–48.5)
HGB BLD-MCNC: 11.9 G/DL (ref 12–16)
IMM GRANULOCYTES # BLD AUTO: 0.05 K/UL (ref 0–0.04)
IMM GRANULOCYTES NFR BLD AUTO: 0.6 % (ref 0–0.5)
LYMPHOCYTES # BLD AUTO: 2.3 K/UL (ref 1–4.8)
LYMPHOCYTES NFR BLD: 25.4 % (ref 18–48)
MCH RBC QN AUTO: 25.1 PG (ref 27–31)
MCHC RBC AUTO-ENTMCNC: 30.5 G/DL (ref 32–36)
MCV RBC AUTO: 82 FL (ref 82–98)
MONOCYTES # BLD AUTO: 0.8 K/UL (ref 0.3–1)
MONOCYTES NFR BLD: 8.4 % (ref 4–15)
NEUTROPHILS # BLD AUTO: 5.7 K/UL (ref 1.8–7.7)
NEUTROPHILS NFR BLD: 63.3 % (ref 38–73)
NRBC BLD-RTO: 0 /100 WBC
PLATELET # BLD AUTO: 289 K/UL (ref 150–350)
PMV BLD AUTO: 9.2 FL (ref 9.2–12.9)
POTASSIUM SERPL-SCNC: 3.5 MMOL/L (ref 3.5–5.1)
PROT SERPL-MCNC: 7.7 G/DL (ref 6–8.4)
RBC # BLD AUTO: 4.74 M/UL (ref 4–5.4)
SODIUM SERPL-SCNC: 145 MMOL/L (ref 136–145)
WBC # BLD AUTO: 9.05 K/UL (ref 3.9–12.7)

## 2020-12-14 PROCEDURE — 99215 OFFICE O/P EST HI 40 MIN: CPT | Mod: PBBFAC | Performed by: NURSE PRACTITIONER

## 2020-12-14 PROCEDURE — 99215 PR OFFICE/OUTPT VISIT, EST, LEVL V, 40-54 MIN: ICD-10-PCS | Mod: S$PBB,,, | Performed by: NURSE PRACTITIONER

## 2020-12-14 PROCEDURE — 99999 PR PBB SHADOW E&M-EST. PATIENT-LVL V: ICD-10-PCS | Mod: PBBFAC,,, | Performed by: NURSE PRACTITIONER

## 2020-12-14 PROCEDURE — 36415 COLL VENOUS BLD VENIPUNCTURE: CPT

## 2020-12-14 PROCEDURE — 99215 OFFICE O/P EST HI 40 MIN: CPT | Mod: S$PBB,,, | Performed by: NURSE PRACTITIONER

## 2020-12-14 PROCEDURE — 99999 PR PBB SHADOW E&M-EST. PATIENT-LVL V: CPT | Mod: PBBFAC,,, | Performed by: NURSE PRACTITIONER

## 2020-12-14 PROCEDURE — 80053 COMPREHEN METABOLIC PANEL: CPT

## 2020-12-14 PROCEDURE — 85025 COMPLETE CBC W/AUTO DIFF WBC: CPT

## 2020-12-14 RX ORDER — BUTALBITAL, ACETAMINOPHEN AND CAFFEINE 50; 325; 40 MG/1; MG/1; MG/1
1 CAPSULE ORAL 2 TIMES DAILY PRN
Qty: 60 CAPSULE | Refills: 1 | Status: SHIPPED | OUTPATIENT
Start: 2020-12-14 | End: 2021-02-02 | Stop reason: SDUPTHER

## 2020-12-14 NOTE — TELEPHONE ENCOUNTER
Patient was left a message with regard to her supper trellis lymph node biopsy, no evidence of metastatic disease.  I discussed with her that her medical case will be presented at tumor board and we will let her know the results of those discussions

## 2020-12-14 NOTE — TELEPHONE ENCOUNTER
Patient was informed of her pathology results.  I stated that we would discuss her situation with regard to breast cancer tumor board.  She states she is doing reasonably well from the surgery.      She states that she does left the emergency room due to high blood pressure

## 2020-12-14 NOTE — PROGRESS NOTES
Subjective:       Patient ID: Susu Luther is a 70 y.o. female.    Chief Complaint: Breast Cancer    70-year-old female, presents today for ongoing follow-up of breast cancer.  Patient originally diagnosed of breast cancer 3/2019 with right side involvement, ER positive NM negative HER2 negative.  Treated with radiation and followed by letrozole.  Due to low Oncotype score patient was not recommended for chemotherapy.  Recent PET scan completed 11/04/2020 showed a large right subpectoral lymph node mass.  Patient was taken to surgery by Dr. Starks on 12/02/2020, pathology unclear for confirmation of breast cancer recurrence.    Today's visit:  Patient presents for a visit that was previously scheduled for follow-up prior to the new findings.  Patient complains of diffuse right arm swelling, tenderness to the right axillary.  Reports severe headaches.  Also reports her blood pressure has been more uncontrollable since surgery.    Review of Systems   Constitutional: Positive for activity change and fatigue. Negative for appetite change, chills, diaphoresis, fever and unexpected weight change.   HENT: Negative for congestion, hearing loss, mouth sores, postnasal drip, rhinorrhea, sore throat and trouble swallowing.    Eyes: Negative for discharge and visual disturbance.   Respiratory: Negative for cough, chest tightness and shortness of breath.    Cardiovascular: Negative for chest pain, palpitations and leg swelling.   Gastrointestinal: Negative for abdominal distention, blood in stool, constipation, diarrhea, nausea and vomiting.   Endocrine: Negative for cold intolerance and heat intolerance.   Genitourinary: Negative for difficulty urinating, dyspareunia, flank pain and hematuria.   Musculoskeletal: Negative for arthralgias, back pain and myalgias.        Right arm edema   Skin: Negative.    Neurological: Positive for light-headedness. Negative for dizziness, weakness and headaches.   Hematological:  Negative for adenopathy. Does not bruise/bleed easily.   Psychiatric/Behavioral: Positive for dysphoric mood. Negative for agitation, behavioral problems and confusion. The patient is not nervous/anxious.        Medication List with Changes/Refills   Current Medications    ALBUTEROL (VENTOLIN HFA) 90 MCG/ACTUATION INHALER    Inhale 2 puffs into the lungs every 4 (four) hours as needed for Shortness of Breath.    ALPRAZOLAM (XANAX) 0.5 MG TABLET    Take 1 tablet (0.5 mg total) by mouth 2 (two) times daily as needed for Insomnia or Anxiety.    AMLODIPINE (NORVASC) 10 MG TABLET    Take 1 tablet (10 mg total) by mouth once daily.    ASPIRIN (ECOTRIN) 81 MG EC TABLET    Take 1 tablet (81 mg total) by mouth once daily.    ATORVASTATIN (LIPITOR) 40 MG TABLET    Take 1 tablet (40 mg total) by mouth once daily.    BISMUTH SUBSALICYLATE (PEPTO BISMOL) 262 MG/15 ML SUSPENSION    Take 15 mLs by mouth every 6 (six) hours as needed for Indigestion.    BLOOD PRESSURE KIT MED AND LRG KIT    Take blood pressure first thing in the morning.    BLOOD PRESSURE KIT MED AND LRG KIT    Check blood pressure every morning    CHOLECALCIFEROL, VITAMIN D3, 50,000 UNIT CAPSULE    Take 50,000 Units by mouth once a week.     CLONIDINE (CATAPRES) 0.2 MG TABLET    Take 1 tablet (0.2 mg total) by mouth 3 (three) times daily.    EMOLLIENT COMBINATION NO.63 (MIADERM) LOTN    Apply 1 application topically 3 (three) times daily.    ERYTHROMYCIN (ROMYCIN) OPHTHALMIC OINTMENT    APPLY TO EYELASHES AT BEDTIME    EVOLOCUMAB (REPATHA SURECLICK) 140 MG/ML PNIJ    Inject 1 mL (140 mg total) into the skin every 14 (fourteen) days.    FUROSEMIDE (LASIX) 20 MG TABLET    take 1/2 tablet by mouth a day.    GABAPENTIN (NEURONTIN) 600 MG TABLET    Take 1 tablet (600 mg total) by mouth 3 (three) times daily.    HYDRALAZINE (APRESOLINE) 25 MG TABLET    Take 1 tablet (25 mg total) by mouth every 8 (eight) hours.    HYDROCHLOROTHIAZIDE (HYDRODIURIL) 25 MG TABLET    Take 1  tablet (25 mg total) by mouth once daily.    INHALATION SPACING DEVICE (COMPACT SPACE CHAMBER)    Use as directed for inhalation.    LETROZOLE (FEMARA) 2.5 MG TAB    Take 1 tablet (2.5 mg total) by mouth once daily.    LIDOCAINE (LIDODERM) 5 %    Place 1 patch onto the skin daily as needed. Remove & Discard patch within 12 hours or as directed by MD    LIDOCAINE HCL-MENTHOL 4-1 % PTMD    Apply 1 patch topically daily as needed.    LIDOCAINE-PRILOCAINE (EMLA) CREAM        LOSARTAN (COZAAR) 100 MG TABLET    Take 1 tablet by mouth once daily    MECLIZINE (ANTIVERT) 12.5 MG TABLET    Take 1 tablet by mouth three times daily as needed    MULTIVITAMIN (ONE DAILY MULTIVITAMIN) PER TABLET    Take 1 tablet by mouth once daily.    NARCAN 4 MG/ACTUATION SPRY    CALL 911. ADMINISTER A SINGLE SPRAY INTRANASALLY INTO ONE NOSTRIL UPON SIGNS OF OPIOID OVERDOSE. MAY REPEAT AFTER 3 MINUTES IF NO RESPONSE.    OXYCODONE-ACETAMINOPHEN (PERCOCET) 7.5-325 MG PER TABLET    Take 1 tablet by mouth every 6 (six) hours as needed for Pain.    PREDNISOLONE ACETATE (PRED FORTE) 1 % DRPS    INSTILL 1 DROP INTO EACH EYE THREE TIMES DAILY    PROPRANOLOL (INDERAL) 40 MG TABLET    Take 1 tablet (40 mg total) by mouth 2 (two) times daily.    SERTRALINE (ZOLOFT) 50 MG TABLET    Take 1 tablet (50 mg total) by mouth once daily.    TRAMADOL (ULTRAM) 50 MG TABLET    Take 1 tablet (50 mg total) by mouth every 6 (six) hours as needed.    VENLAFAXINE (EFFEXOR-XR) 75 MG 24 HR CAPSULE    Take 1 capsule (75 mg total) by mouth once daily.    ZOLPIDEM (AMBIEN) 5 MG TAB    Take 1 tablet (5 mg total) by mouth nightly as needed (sleep).   Changed and/or Refilled Medications    Modified Medication Previous Medication    BUTALBITAL-ACETAMINOPHEN-CAFF -40 MG -40 MG CAP butalbital-acetaminophen-caff -40 mg -40 mg Cap       Take 1 capsule by mouth 2 (two) times daily as needed.    Take 1 capsule by mouth 2 (two) times daily as needed.     Objective:      Vitals:    12/14/20 1428   BP: (!) 188/121   Pulse: 74   Temp: 98.6 °F (37 °C)     Physical Exam  Vitals signs reviewed.   Constitutional:       General: She is not in acute distress.     Appearance: She is well-developed.   HENT:      Head: Normocephalic and atraumatic.      Right Ear: Hearing and external ear normal.      Left Ear: Hearing and external ear normal.      Nose: No rhinorrhea.      Right Sinus: No maxillary sinus tenderness or frontal sinus tenderness.      Left Sinus: No maxillary sinus tenderness or frontal sinus tenderness.      Mouth/Throat:      Mouth: No oral lesions.      Pharynx: Uvula midline.   Eyes:      General:         Right eye: No discharge.         Left eye: No discharge.      Conjunctiva/sclera: Conjunctivae normal.      Pupils: Pupils are equal, round, and reactive to light.   Neck:      Musculoskeletal: Normal range of motion.      Thyroid: No thyromegaly.      Vascular: No carotid bruit.      Trachea: No tracheal deviation.   Cardiovascular:      Rate and Rhythm: Normal rate and regular rhythm.      Pulses:           Dorsalis pedis pulses are 2+ on the right side and 2+ on the left side.      Heart sounds: Normal heart sounds, S1 normal and S2 normal. No murmur.   Pulmonary:      Effort: Pulmonary effort is normal. No respiratory distress.      Breath sounds: Normal breath sounds.   Abdominal:      General: Bowel sounds are normal. There is no distension.      Palpations: Abdomen is soft. There is no mass.      Tenderness: There is no abdominal tenderness.   Musculoskeletal: Normal range of motion.      Comments: Right arm edema generalized throughout the extremity   Lymphadenopathy:      Cervical: No cervical adenopathy.      Upper Body:      Right upper body: No supraclavicular adenopathy.      Left upper body: No supraclavicular adenopathy.   Skin:     General: Skin is warm and dry.      Capillary Refill: Capillary refill takes less than 2 seconds.      Findings: No rash.           Neurological:      Mental Status: She is alert and oriented to person, place, and time.      Sensory: No sensory deficit.      Coordination: Coordination normal.      Gait: Gait normal.   Psychiatric:         Mood and Affect: Mood is depressed. Mood is not anxious. Affect is labile and tearful.         Speech: Speech normal.         Behavior: Behavior normal.         Thought Content: Thought content normal.         Judgment: Judgment normal.         Assessment:       Problem List Items Addressed This Visit        Neuro    Stroke    Headache - Primary    Relevant Medications    butalbital-acetaminophen-caff -40 mg -40 mg Cap       Psychiatric    Major depressive disorder, single episode, moderate with anxious distress       Cardiac/Vascular    Chronic diastolic congestive heart failure       Oncology    Malignant neoplasm of upper-outer quadrant of right breast in female, estrogen receptor positive (Chronic)    Relevant Medications    butalbital-acetaminophen-caff -40 mg -40 mg Cap    Other Relevant Orders    Ambulatory referral/consult to Physical/Occupational Therapy       Other    Lymphadenopathy          Plan:         Headache:  --refilled prescription for Fioricet per orders    Breast cancer:  --reviewed case with Radiation Oncology  --patient previously seen by Dr. Huynh, will refer to Dr. Boles for continuity of care  --following discussion with Radiation Oncology, patient's case will be added to tumor board for discussion and suggestions for treatment    Follow-up:  Patient to be scheduled to see radiation oncology and Dr. Boles as soon as possible

## 2020-12-14 NOTE — PATIENT INSTRUCTIONS
COVID-19 and Cancer Patients    What is COVID-19?  COVID-19 is a new type of virus that can cause mild to severe infections in the lungs. Like other viruses, it can lead to serious infections for people with weakened immune systems. COVID-19 may cause more severe infections than other viruses. We do not have a vaccine to help control its spread, but experts are working to make a vaccine.    How does COVID-19 spread?  The virus can spread easily, just like the common cold or flu. It spreads when an infected person coughs or sneezes droplets that can get into the eyes, nose, or mouth of people nearby. Droplets also land on surfaces that people touch before touching their own eyes, nose, or mouth.    How can I protect myself?  These are some of the best ways to protect yourself and others from the virus:  - Wash your hands often with soap and water for at least 20 seconds.  - Use hand  with 60% or more alcohol until you can wash your hands with soap and water.  - Avoid touching your eyes, nose, and mouth without washing your hands first.  - Clean and disinfect surfaces often. Regular household wipes and sprays will kill the virus. Be sure to  clean places that people touch a lot, such as door handles, phones, keyboards, and light switches.  - Avoid handshakes, hugging, and standing or sitting close to people who are coughing or sneezing.  - Be as healthy as you can. Get plenty of sleep, eat healthy, exercise, and manage your stress.  If you are sick, follow these steps:  - Stay home.  - Cover your nose and mouth when you cough or sneeze. If you use a tissue, put it in the garbage right  away. If you do not have a tissue, cough or sneeze into your elbow crease.  - Call before going to your medical appointments. Let them know about recent travel or if you have had  contact with a person with COVID-19.          As of 3/12/2020  Should I wear a mask?  Experts don't believe that wearing a mask is helpful for the  general public, unless there is concern you have been exposed and may not have symptoms. Some people should use certain types of masks because of their own health or the type of work they do. Talk to your doctor or nurse to see if you would benefit from wearing a mask. If you arrive at the hospital with respiratory symptoms, please ask for a mask.    What if I care for or live with a cancer patient?  If you are caring for or living with someone with cancer, do your best to keep them from getting the virus.  Follow the steps to protect yourself listed on this sheet.  If you become sick yourself, call your doctor to see what more you should do to protect your loved one.    What about people visiting?   If you are sick or have been exposed to COVID-19, we ask that you not come to [facility].    If patients have symptoms, call the [facility]id-[facility] Line ([phone])    We ask that you find someone who isn't sick to join your loved one at their appointments.  To protect all patients, we may limit the number of people who can visit someone staying in the hospital.  Please check with your care team.    How will [facility] protect me from getting COVID-19?  Our hospital and clinics are taking steps to keep infected patients separate from those who may be at risk. At every appointment, your care team will ask questions about overall health and recent travel. We may ask some patients to wait in a separate room or to reschedule until they are feeling better if they have symptoms.  We are also taking extra steps to clean and disinfect surfaces throughout our hospital and clinics.     Will you still care for me if I get sick?  Yes. Your care is our top priority. Although we may change some ways we care for you, we will never put your care or health at risk.            CALL BEFORE YOU ACT   Dial 211 or Text keyword LACOVID to [phone] for general questions and information.   If patients have  symptoms, call the [facility] Line ([phone])               [facility] ([email or web address])    [email or web address]

## 2020-12-15 ENCOUNTER — PATIENT OUTREACH (OUTPATIENT)
Dept: ADMINISTRATIVE | Facility: OTHER | Age: 70
End: 2020-12-15

## 2020-12-15 ENCOUNTER — OFFICE VISIT (OUTPATIENT)
Dept: CARDIOLOGY | Facility: CLINIC | Age: 70
End: 2020-12-15
Payer: MEDICARE

## 2020-12-15 VITALS
WEIGHT: 290.38 LBS | HEART RATE: 89 BPM | SYSTOLIC BLOOD PRESSURE: 156 MMHG | BODY MASS INDEX: 42.26 KG/M2 | DIASTOLIC BLOOD PRESSURE: 110 MMHG | OXYGEN SATURATION: 98 %

## 2020-12-15 DIAGNOSIS — R06.02 SOB (SHORTNESS OF BREATH): ICD-10-CM

## 2020-12-15 DIAGNOSIS — I10 UNCONTROLLED HYPERTENSION: ICD-10-CM

## 2020-12-15 DIAGNOSIS — E66.01 MORBID OBESITY WITH BMI OF 45.0-49.9, ADULT: ICD-10-CM

## 2020-12-15 DIAGNOSIS — G47.33 OSA (OBSTRUCTIVE SLEEP APNEA): Chronic | ICD-10-CM

## 2020-12-15 DIAGNOSIS — N64.4 CHRONIC PAIN OF BREAST: ICD-10-CM

## 2020-12-15 DIAGNOSIS — I50.32 CHRONIC DIASTOLIC CONGESTIVE HEART FAILURE: ICD-10-CM

## 2020-12-15 DIAGNOSIS — G89.29 CHRONIC PAIN OF BREAST: ICD-10-CM

## 2020-12-15 DIAGNOSIS — I10 ESSENTIAL HYPERTENSION: Primary | ICD-10-CM

## 2020-12-15 PROCEDURE — 99999 PR PBB SHADOW E&M-EST. PATIENT-LVL V: CPT | Mod: PBBFAC,,, | Performed by: INTERNAL MEDICINE

## 2020-12-15 PROCEDURE — 99214 PR OFFICE/OUTPT VISIT, EST, LEVL IV, 30-39 MIN: ICD-10-PCS | Mod: S$PBB,,, | Performed by: INTERNAL MEDICINE

## 2020-12-15 PROCEDURE — 99999 PR PBB SHADOW E&M-EST. PATIENT-LVL V: ICD-10-PCS | Mod: PBBFAC,,, | Performed by: INTERNAL MEDICINE

## 2020-12-15 PROCEDURE — 99215 OFFICE O/P EST HI 40 MIN: CPT | Mod: PBBFAC | Performed by: INTERNAL MEDICINE

## 2020-12-15 PROCEDURE — 99214 OFFICE O/P EST MOD 30 MIN: CPT | Mod: S$PBB,,, | Performed by: INTERNAL MEDICINE

## 2020-12-15 RX ORDER — HYDRALAZINE HYDROCHLORIDE 50 MG/1
50 TABLET, FILM COATED ORAL EVERY 8 HOURS
Qty: 90 TABLET | Refills: 11 | Status: SHIPPED | OUTPATIENT
Start: 2020-12-15 | End: 2020-12-28 | Stop reason: SDUPTHER

## 2020-12-15 RX ORDER — PROPRANOLOL HYDROCHLORIDE 40 MG/1
40 TABLET ORAL 2 TIMES DAILY
Qty: 60 TABLET | Refills: 11 | Status: SHIPPED | OUTPATIENT
Start: 2020-12-15 | End: 2021-02-01

## 2020-12-15 NOTE — PROGRESS NOTES
Subjective:   Patient ID:  Susu Luther is a 70 y.o. female who presents for follow up of Palpitations, Shortness of Breath, and Dizziness      71 yo female, care establish. Prior cardiologist Dr ackerman   [facility] HTN, CVA (), Right breast CA, Lumpectomy 3/2019, h/o PE off OAC 2 yrs ago.  AAA, s/p transcutaneous patch by Dr. Bonds, obesity knee OA imbalanced walker dependent  C/o SOB after walking few steps and dizziness. Had vision issue 1 month ago due to uncontrolled HTN  No chest pain  Sleeps with 2 pillows  Decent appetites  Leg calf pain worse at night  No smoking/drinking  ekg today NSR LVH.    ECH normal EF, grade II DD, LAE and PAP 56 mmHG   Chest CTA negative for PE  BP high    A1c controlled    S/p Open subpectoral lymph node biopsy on the right on 2020 by Dr. Starks  Still right chest, under arm and shoudler supersensitive pain        Past Medical History:   Diagnosis Date    Chronic diastolic congestive heart failure 2020    Encounter for blood transfusion     History of repair of aneurysm of abdominal aorta using endovascular stent graft     DR Bonds    Hx of psychiatric care     Hypertension     Major depressive disorder, single episode, moderate with anxious distress 2020    Malignant neoplasm of upper-outer quadrant of right breast in female, estrogen receptor positive 3/14/2019    radiation    Psychiatric problem     Sleep apnea     Sleep difficulties     Stroke     no residual defect    Therapy        Past Surgical History:   Procedure Laterality Date    APPENDECTOMY      AXILLARY NODE DISSECTION Right 2020    Procedure: LYMPHADENECTOMY, AXILLARY;  Surgeon: Artemio Starks MD;  Location: [facility];  Service: General;  Laterality: Right;    BREAST BIOPSY          BREAST LUMPECTOMY           SECTION      x 1    OOPHORECTOMY      right     SENTINEL LYMPH NODE BIOPSY Right 3/27/2019    Procedure: BIOPSY, LYMPH  NODE, SENTINEL;  Surgeon: Artemio Starks MD;  Location: [facility];  Service: General;  Laterality: Right;       Social History     Tobacco Use    Smoking status: Never Smoker    Smokeless tobacco: Never Used   Substance Use Topics    Alcohol use: No    Drug use: No       Family History   Problem Relation Age of Onset    Diabetes Maternal Grandmother     Diabetes Maternal Grandfather     Diabetes Mother     Breast cancer Neg Hx     Colon cancer Neg Hx     Ovarian cancer Neg Hx          Review of Systems   Constitution: Negative for decreased appetite, diaphoresis, fever, malaise/fatigue and night sweats.   HENT: Negative for nosebleeds.    Eyes: Negative for blurred vision and double vision.   Cardiovascular: Positive for dyspnea on exertion. Negative for chest pain, claudication, irregular heartbeat, leg swelling, near-syncope, orthopnea, palpitations, paroxysmal nocturnal dyspnea and syncope.   Respiratory: Positive for shortness of breath. Negative for cough, sleep disturbances due to breathing, snoring, sputum production and wheezing.    Endocrine: Negative for cold intolerance and polyuria.   Hematologic/Lymphatic: Does not bruise/bleed easily.   Skin: Negative for rash.   Musculoskeletal: Negative for back pain, falls, joint pain, joint swelling and neck pain.        Right chest breast and shoulder pain   Gastrointestinal: Negative for abdominal pain, heartburn, nausea and vomiting.   Genitourinary: Negative for dysuria, frequency and hematuria.   Neurological: Negative for difficulty with concentration, dizziness, focal weakness, headaches, light-headedness, numbness, seizures and weakness.   Psychiatric/Behavioral: Negative for depression, memory loss and substance abuse. The patient does not have insomnia.    Allergic/Immunologic: Negative for HIV exposure and hives.       Objective:   Physical Exam   Constitutional: She is oriented to person, place, and time. She appears well-nourished.   HENT:    Head: Normocephalic.   Eyes: Pupils are equal, round, and reactive to light.   Neck: Normal carotid pulses and no JVD present. Carotid bruit is not present. No thyromegaly present.   Cardiovascular: Normal rate, regular rhythm, normal heart sounds and normal pulses.  No extrasystoles are present. PMI is not displaced. Exam reveals no gallop and no S3.   No murmur heard.  Pulmonary/Chest: Breath sounds normal. No stridor. No respiratory distress.   Right breast surgical wound healing weal.   Right breast lateral chest and shoulder supersensitive tenderness     Abdominal: Soft. Bowel sounds are normal. There is no abdominal tenderness. There is no rebound.   Musculoskeletal: Normal range of motion.   Neurological: She is alert and oriented to person, place, and time.   Skin: Skin is intact. No rash noted.   Psychiatric: Her behavior is normal.       Lab Results   Component Value Date    CHOL 221 (H) 10/19/2020    CHOL 231 (H) 08/25/2020    CHOL 229 (H) 05/18/2019     Lab Results   Component Value Date    HDL 55 10/19/2020    HDL 49 08/25/2020    HDL 43 05/18/2019     Lab Results   Component Value Date    LDLCALC 137.4 10/19/2020    LDLCALC 135.6 08/25/2020    LDLCALC 148.0 05/18/2019     Lab Results   Component Value Date    TRIG 143 10/19/2020    TRIG 232 (H) 08/25/2020    TRIG 190 (H) 05/18/2019     Lab Results   Component Value Date    CHOLHDL 24.9 10/19/2020    CHOLHDL 21.2 08/25/2020    CHOLHDL 18.8 (L) 05/18/2019       Chemistry        Component Value Date/Time     12/14/2020 1400    K 3.5 12/14/2020 1400     12/14/2020 1400    CO2 28 12/14/2020 1400    BUN 15 12/14/2020 1400    CREATININE 1.1 12/14/2020 1400    GLU 86 12/14/2020 1400        Component Value Date/Time    CALCIUM 9.4 12/14/2020 1400    ALKPHOS 133 12/14/2020 1400    AST 13 12/14/2020 1400    ALT 13 12/14/2020 1400    BILITOT 0.2 12/14/2020 1400    ESTGFRAFRICA 60 12/14/2020 1400    EGFRNONAA 52 (A) 12/14/2020 1400          Lab  Results   Component Value Date    HGBA1C 5.9 (H) 05/18/2019     Lab Results   Component Value Date    TSH 1.170 12/16/2019     Lab Results   Component Value Date    INR 1.0 11/10/2020    INR 1.0 05/19/2019     Lab Results   Component Value Date    WBC 9.05 12/14/2020    HGB 11.9 (L) 12/14/2020    HCT 39.0 12/14/2020    MCV 82 12/14/2020     12/14/2020     BMP  Sodium   Date Value Ref Range Status   12/14/2020 145 136 - 145 mmol/L Final     Potassium   Date Value Ref Range Status   12/14/2020 3.5 3.5 - 5.1 mmol/L Final     Chloride   Date Value Ref Range Status   12/14/2020 108 95 - 110 mmol/L Final     CO2   Date Value Ref Range Status   12/14/2020 28 23 - 29 mmol/L Final     BUN   Date Value Ref Range Status   12/14/2020 15 8 - 23 mg/dL Final     Creatinine   Date Value Ref Range Status   12/14/2020 1.1 0.5 - 1.4 mg/dL Final     Calcium   Date Value Ref Range Status   12/14/2020 9.4 8.7 - 10.5 mg/dL Final     Anion Gap   Date Value Ref Range Status   12/14/2020 9 8 - 16 mmol/L Final     eGFR if    Date Value Ref Range Status   12/14/2020 60 >60 mL/min/1.73 m^2 Final     eGFR if non    Date Value Ref Range Status   12/14/2020 52 (A) >60 mL/min/1.73 m^2 Final     Comment:     Calculation used to obtain the estimated glomerular filtration  rate (eGFR) is the CKD-EPI equation.        BNP  @LABRCNTIP(BNP,BNPTRIAGEBLO)@  @LABRCNTIP(troponini)@  Estimated Creatinine Clearance: 71.4 mL/min (based on SCr of 1.1 mg/dL).  No results found in the last 24 hours.  No results found in the last 24 hours.  No results found in the last 24 hours.    Assessment:      1. Essential hypertension    2. Chronic diastolic congestive heart failure    3. Chronic pain of breast    4. Uncontrolled hypertension    5. Morbid obesity with BMI of 45.0-49.9, adult    6. SOB (shortness of breath)    7. NILS (obstructive sleep apnea)      Supersensitive pain on right breast chest and shoudler    Plan:   Pt only  took Hydralazine 25 mg dialy  Will increase to hydralazine 50 mg bid and titrate up to 50 mg tid  agressive pain control at pain clinic  Continue propanolol, HCTZ losartan amlodipije, hydralazine and clonidine    Counseled DASH  Check Lipid profile in 6 months  Recommend heart-healthy diet, weight control and regular exercise.  Rhiannon. Risk modification.   I have reviewed all pertinent labs and cardiac studies independently. Plans and recommendations have been formulated under my direct supervision. All questions answered and patient voiced understanding.   If symptoms persist go to the ED  RTC in 4 weeks

## 2020-12-16 ENCOUNTER — DOCUMENTATION ONLY (OUTPATIENT)
Dept: HEMATOLOGY/ONCOLOGY | Facility: CLINIC | Age: 70
End: 2020-12-16

## 2020-12-16 NOTE — NURSING
Per communication with Dr. Boles I have scheduled this pt for follow up after 1/8/21 Tumor Board review.  I have spoken with pt over the phone today and she will see dr Boles 1/8/21 to review tumor conference recommendations. Pt is in agreement with this plan .  She will see Dr. Starks tomorrow to remove stitches and states she is feeling ok except for a headache which is caused by her BP which she is currently working with cardiology to get under control,.  She has my direct number to call with any further concerns.

## 2020-12-17 ENCOUNTER — OFFICE VISIT (OUTPATIENT)
Dept: SURGERY | Facility: CLINIC | Age: 70
End: 2020-12-17
Payer: MEDICARE

## 2020-12-17 ENCOUNTER — PATIENT OUTREACH (OUTPATIENT)
Dept: OTHER | Facility: OTHER | Age: 70
End: 2020-12-17

## 2020-12-17 ENCOUNTER — SPECIALTY PHARMACY (OUTPATIENT)
Dept: PHARMACY | Facility: CLINIC | Age: 70
End: 2020-12-17

## 2020-12-17 VITALS
BODY MASS INDEX: 42.95 KG/M2 | SYSTOLIC BLOOD PRESSURE: 171 MMHG | TEMPERATURE: 98 F | HEIGHT: 69 IN | DIASTOLIC BLOOD PRESSURE: 91 MMHG | HEART RATE: 56 BPM | WEIGHT: 290 LBS

## 2020-12-17 DIAGNOSIS — Z17.0 MALIGNANT NEOPLASM OF UPPER-OUTER QUADRANT OF RIGHT BREAST IN FEMALE, ESTROGEN RECEPTOR POSITIVE: Chronic | ICD-10-CM

## 2020-12-17 DIAGNOSIS — C50.411 MALIGNANT NEOPLASM OF UPPER-OUTER QUADRANT OF RIGHT BREAST IN FEMALE, ESTROGEN RECEPTOR POSITIVE: Chronic | ICD-10-CM

## 2020-12-17 PROCEDURE — 99024 POSTOP FOLLOW-UP VISIT: CPT | Mod: POP,,, | Performed by: SURGERY

## 2020-12-17 PROCEDURE — 99999 PR PBB SHADOW E&M-EST. PATIENT-LVL V: ICD-10-PCS | Mod: PBBFAC,,, | Performed by: SURGERY

## 2020-12-17 PROCEDURE — 99999 PR PBB SHADOW E&M-EST. PATIENT-LVL V: CPT | Mod: PBBFAC,,, | Performed by: SURGERY

## 2020-12-17 PROCEDURE — 99215 OFFICE O/P EST HI 40 MIN: CPT | Mod: PBBFAC | Performed by: SURGERY

## 2020-12-17 PROCEDURE — 99024 PR POST-OP FOLLOW-UP VISIT: ICD-10-PCS | Mod: POP,,, | Performed by: SURGERY

## 2020-12-17 RX ORDER — GABAPENTIN 600 MG/1
600 TABLET ORAL 3 TIMES DAILY
Qty: 90 TABLET | Refills: 3 | Status: SHIPPED | OUTPATIENT
Start: 2020-12-17 | End: 2021-04-05 | Stop reason: SDUPTHER

## 2020-12-17 NOTE — PROGRESS NOTES
Subjective:       Patient ID: Susu Luther is a 70 y.o. female.     postop pectoral lymph node biopsy    Chief Complaint: Post-op Evaluation     Post op pain is resolving, no swelling, hypersentitiviey    Review of Systems   Skin:        Incision is healing       Objective:      Physical Exam  Vitals signs reviewed.   Constitutional:       Appearance: She is obese.   Skin:     Comments: The Right axillary incision is healing         Pathologic Diagnosis Right subpectoral lymph node, excision:   1 lymph node with no evidence of metastases (0/1)   Multiple H&E levels were examined.  Immunohistochemical stains for AE1/AE3,   WSK, and Cam 5.2 were performed with adequate controls and are negative        Assessment:      Recovering from right sub pectoral lymph node biopsy       Plan:       Present at tumor board, await recommendation    Follow up in 4 weeks

## 2020-12-17 NOTE — TELEPHONE ENCOUNTER
Specialty Pharmacy - Refill Coordination    Specialty Medication Orders Linked to Encounter      Most Recent Value   Medication #1  letrozole (FEMARA) 2.5 mg Tab (Order#[id number], Rx#[id number])          Refill Questions - Documented Responses      Most Recent Value   Relationship to patient of person spoken to?  Self   HIPAA/medical authority confirmed?  Yes   Any changes in contact preferences or allowed representatives?  No   Has the patient had any insurance changes?  No   Has the patient had any changes to specialty medication, dose, or instructions?  No   Has the patient started taking any new medications, herbals, or supplements?  No   Has the patient been diagnosed with any new medical conditions?  No   Does the patient have any new allergies to medications or foods?  No   Does the patient have any concerns about side effects?  No   Can the patient store medication/sharps container properly (at the correct temperature, away from children/pets, etc.)?  Yes   Can the patient call emergency services (911) in the event of an emergency?  Yes   Does the patient have any concerns or questions about taking or administering this medication as prescribed?  No   How many doses did the patient miss in the past 4 weeks or since the last fill?  0   How many doses does the patient have on hand?  7   How many days does the patient report on hand quantity will last?  7   Does the number of doses/days supply remaining match pharmacy expected amounts?  Yes   Does the patient feel that this medication is effective?  Yes   During the past 4 weeks, has patient missed any activities due to condition or medication?  No   During the past 4 weeks, did patient have any of the following urgent care visits?  None   How will the patient receive the medication?  Mail   When does the patient need to receive the medication?  12/21/20   Shipping Address  Home   Address in [facility] confirmed and updated if neccessary?  Yes    Expected Copay ($)  0   Is the patient able to afford the medication copay?  Yes   Payment Method  zero copay   Days supply of Refill  28   Would patient like to speak to a pharmacist?  No   Do you want to trigger an intervention?  No   Do you want to trigger an additional referral task?  No   Refill activity completed?  Yes   Refill activity plan  Refill scheduled   Shipment/Pickup Date:  12/22/20          Current Outpatient Medications   Medication Sig    albuterol (VENTOLIN HFA) 90 mcg/actuation inhaler Inhale 2 puffs into the lungs every 4 (four) hours as needed for Shortness of Breath.    ALPRAZolam (XANAX) 0.5 MG tablet Take 1 tablet (0.5 mg total) by mouth 2 (two) times daily as needed for Insomnia or Anxiety.    amLODIPine (NORVASC) 10 MG tablet Take 1 tablet (10 mg total) by mouth once daily.    aspirin (ECOTRIN) 81 MG EC tablet Take 1 tablet (81 mg total) by mouth once daily.    atorvastatin (LIPITOR) 40 MG tablet Take 1 tablet (40 mg total) by mouth once daily.    bismuth subsalicylate (PEPTO BISMOL) 262 mg/15 mL suspension Take 15 mLs by mouth every 6 (six) hours as needed for Indigestion.    blood pressure kit med and lrg Kit Take blood pressure first thing in the morning.    blood pressure kit med and lrg Kit Check blood pressure every morning    butalbital-acetaminophen-caff -40 mg -40 mg Cap Take 1 capsule by mouth 2 (two) times daily as needed.    cholecalciferol, vitamin D3, 50,000 unit capsule Take 50,000 Units by mouth once a week.     cloNIDine (CATAPRES) 0.2 MG tablet Take 1 tablet (0.2 mg total) by mouth 3 (three) times daily.    emollient combination no.63 (MIADERM) Lotn Apply 1 application topically 3 (three) times daily.    erythromycin (ROMYCIN) ophthalmic ointment APPLY TO EYELASHES AT BEDTIME    evolocumab (REPATHA SURECLICK) 140 mg/mL PnIj Inject 1 mL (140 mg total) into the skin every 14 (fourteen) days.    furosemide (LASIX) 20 MG tablet take 1/2 tablet by  mouth a day.    gabapentin (NEURONTIN) 600 MG tablet Take 1 tablet (600 mg total) by mouth 3 (three) times daily.    hydrALAZINE (APRESOLINE) 50 MG tablet Take 1 tablet (50 mg total) by mouth every 8 (eight) hours.    hydroCHLOROthiazide (HYDRODIURIL) 25 MG tablet Take 1 tablet (25 mg total) by mouth once daily.    inhalation spacing device (COMPACT SPACE CHAMBER) Use as directed for inhalation.    letrozole (FEMARA) 2.5 mg Tab Take 1 tablet (2.5 mg total) by mouth once daily.    lidocaine (LIDODERM) 5 % Place 1 patch onto the skin daily as needed. Remove & Discard patch within 12 hours or as directed by MD    lidocaine HCL-menthoL 4-1 % PtMd Apply 1 patch topically daily as needed.    lidocaine-prilocaine (EMLA) cream     losartan (COZAAR) 100 MG tablet Take 1 tablet by mouth once daily    meclizine (ANTIVERT) 12.5 mg tablet Take 1 tablet by mouth three times daily as needed    multivitamin (ONE DAILY MULTIVITAMIN) per tablet Take 1 tablet by mouth once daily.    NARCAN 4 mg/actuation [place] CALL 911. ADMINISTER A SINGLE SPRAY INTRANASALLY INTO ONE NOSTRIL UPON SIGNS OF OPIOID OVERDOSE. MAY REPEAT AFTER 3 MINUTES IF NO RESPONSE.    oxyCODONE-acetaminophen (PERCOCET) 7.5-325 mg per tablet Take 1 tablet by mouth every 6 (six) hours as needed for Pain.    prednisoLONE acetate (PRED FORTE) 1 % DrpS INSTILL 1 DROP INTO EACH EYE THREE TIMES DAILY    propranoloL (INDERAL) 40 MG tablet Take 1 tablet (40 mg total) by mouth 2 (two) times daily.    sertraline (ZOLOFT) 50 MG tablet Take 1 tablet (50 mg total) by mouth once daily.    traMADoL (ULTRAM) 50 mg tablet Take 1 tablet (50 mg total) by mouth every 6 (six) hours as needed.    venlafaxine (EFFEXOR-XR) 75 MG 24 hr capsule Take 1 capsule (75 mg total) by mouth once daily.    zolpidem (AMBIEN) 5 MG Tab Take 1 tablet (5 mg total) by mouth nightly as needed (sleep).   Last reviewed on 12/17/2020  2:02 PM by Artemio Starks MD    Review of patient's  allergies indicates:   Allergen Reactions    Pcn [penicillins] Rash    Last reviewed on  12/17/2020 2:02 PM by Artemio Starks      Tasks added this encounter   No tasks added.   Tasks due within next 3 months   12/17/2020 - Refill Call (Auto Added)     Ladi Moreira  [facility]  [address] [place] [id number]  Phone: [phone]  Fax: [phone]

## 2020-12-21 ENCOUNTER — PATIENT MESSAGE (OUTPATIENT)
Dept: OTHER | Facility: OTHER | Age: 70
End: 2020-12-21

## 2020-12-22 ENCOUNTER — OFFICE VISIT (OUTPATIENT)
Dept: RADIATION ONCOLOGY | Facility: CLINIC | Age: 70
End: 2020-12-22
Payer: MEDICARE

## 2020-12-22 ENCOUNTER — TELEPHONE (OUTPATIENT)
Dept: FAMILY MEDICINE | Facility: CLINIC | Age: 70
End: 2020-12-22

## 2020-12-22 VITALS
HEART RATE: 71 BPM | HEIGHT: 69 IN | DIASTOLIC BLOOD PRESSURE: 103 MMHG | TEMPERATURE: 97 F | BODY MASS INDEX: 43.34 KG/M2 | SYSTOLIC BLOOD PRESSURE: 210 MMHG | WEIGHT: 292.63 LBS | RESPIRATION RATE: 18 BRPM

## 2020-12-22 DIAGNOSIS — C50.411 MALIGNANT NEOPLASM OF UPPER-OUTER QUADRANT OF RIGHT BREAST IN FEMALE, ESTROGEN RECEPTOR POSITIVE: Primary | ICD-10-CM

## 2020-12-22 DIAGNOSIS — Z17.0 MALIGNANT NEOPLASM OF UPPER-OUTER QUADRANT OF RIGHT BREAST IN FEMALE, ESTROGEN RECEPTOR POSITIVE: Primary | ICD-10-CM

## 2020-12-22 PROCEDURE — 99215 OFFICE O/P EST HI 40 MIN: CPT | Mod: PBBFAC | Performed by: RADIOLOGY

## 2020-12-22 PROCEDURE — 99215 OFFICE O/P EST HI 40 MIN: CPT | Mod: S$PBB,,, | Performed by: RADIOLOGY

## 2020-12-22 PROCEDURE — 99999 PR PBB SHADOW E&M-EST. PATIENT-LVL V: ICD-10-PCS | Mod: PBBFAC,,, | Performed by: RADIOLOGY

## 2020-12-22 PROCEDURE — 99215 PR OFFICE/OUTPT VISIT, EST, LEVL V, 40-54 MIN: ICD-10-PCS | Mod: S$PBB,,, | Performed by: RADIOLOGY

## 2020-12-22 PROCEDURE — 99999 PR PBB SHADOW E&M-EST. PATIENT-LVL V: CPT | Mod: PBBFAC,,, | Performed by: RADIOLOGY

## 2020-12-22 NOTE — PROGRESS NOTES
[facility]  RADIATION ONCOLOGY FOLLOW UP    Name: Susu Luther : 1950     DIAGNOSIS: Right breast lower outer quadrant invasive lobular carcinoma pIIA: pT2 pN0(sn) cM0, ER positive, PA negative, her 2 Steff negative   1. 19 had a bilateral screening mammogram showing an irregular spiculated mass lower outer right breast middle depth.   2. 19 diagnostic mammogram confirmed the mass. Ultrasound showed 2 enlarged right axillary lymph nodes and a lower outer quadrant right breast mass measuring 3.8 cm maximum dimension at 8:00 position.   3. 3/4/19 right breast biopsy and clip and right lymph node biopsy and clip were performed. The axillary clip was not visualized on post biopsy imaging due to possible posterior depth of the node. The breast clip was in the expected position. The right breast returned invasive lobular carcinoma, grade 2 with no lymphovascular or perineural invasion, ER positive 95%, PA negative, her 2 2+, FISH non-amplified. The lymph nodes were benign.   4. 3/27/19 patient had a lumpectomy and sentinel lymph node biopsy. Gross analysis of the lumpectomy was concerning for positive margins of the medial, superior and inferior aspect therefore additional margins were taken. The final deep margin was the pectoralis fascia. The lumpectomy cavity was marked with 6 Ligaclips. Pathology contained 4 benign sentinel lymph nodes and 4 benign non sentinel lymph nodes. The lumpectomy contained invasive lobular carcinoma measuring 4 cm maximum dimension, grade 2. The main lumpectomy had positive margins superior, deep and medial and negative margins inferior, lateral and anterior. The additionally submitted margins had carcinoma 4 mm from the new margin. Final stage pT2 pN0(sn) cM0.   5. Oncotype DX 22.   6. 19 completed 42.56Gy/16fx w 10Gy boost to R breast  7. R chest wall lymph node biopsy negative but PET 20 showed large hypermetabolic R subpectoral  "lymph node mass    INTERVAL HISTORY: Susu Luther is a pleasant 70 y.o. female who presents today for follow-up.  She was last seen by Dr. Arreguin in August 2019 and was still having severe R breast pain.  PET 11/4/20 showed post-treatment changes of R breast, large hypermetabolic R subpectoral lymph node mass, 6.9 x 3.2cm SUV max 13.  R chest wall lymph node biopsy showed benign fragments of lymph node.  Dr. Starks performed R subpectoral node biopsy 12/2/20 which was negative. In the procedure he noted the lymph node mass 6 x 2.5cm and determined he would have to divide and release the pectoralis muscle so only proceeded with biopsy.  Today, she is anxious and her BP is elevated. She still has some pain in the chest wall after the biopsy.    PHYSICAL EXAM:   Constitutional: well appearing, no acute distress, ECOG 2 - Ambulates, capable of self care only  Vitals:    BP (!) 210/103 Comment: Pt has HTN, is in pain  Pulse 71   Temp 97 °F (36.1 °C)   Resp 18   Ht 5' 9" (1.753 m)   Wt 132.7 kg (292 lb 9.6 oz)   LMP  (LMP Unknown)   BMI 43.21 kg/m²   Breast:  Deferred    Laboratory & X-Ray Findings: Per above.  Imaging reviewed personally    ASSESSMENT:  Persistence of gross axillary disease from breast cancer     PLAN:  Ms. Luther has avid R axillary node on PET.  After review of her prior radiation plan delivered by Dr. Arreguin and her recent PET, the subpectoral/axillary jena conglomerate was present on CT simulation in July of 2019 so it has been gross disease never treated. The node was also visible on CTA in May 2020 and MRI breast February 2020.    I would predict that her plan would involve neoadjuvant chemotherapy followed by PET and consideration for surgical dissection (which is unlikely given location and size), then adjuvant radiation to any residual jena mass.    We will discuss her case at next tumor board on 1/8/21 to review surgical options, chemo options (neoadjuvant), and I would " consider re-XRT afterward.  Would likely give 50Gy/25fx with IMRT to the jena basins to minimize dose overlap; volumes are pending response to chemotherapy. Also would consider giving boost SIB or sequential to 60-66Gy to gross disease.    I spent approximately 70 minutes reviewing the available records and evaluating the patient, out of which over 60% of the time was spent face to face with the patient in counseling and coordinating this patient's care.    Adi Wellington M.D.  Radiation Oncology  [facility] [address] [place] [address]

## 2020-12-22 NOTE — TELEPHONE ENCOUNTER
----- Message from Chu Dashawn sent at 12/22/2020  3:11 PM CST -----  Type:  Sooner Apoointment Request    Caller is requesting a sooner appointment.  Caller declined first available appointment listed below.  Caller will not accept being placed on the waitlist and is requesting a message be sent to doctor.  Name of Caller:pt  When is the first available appointment?2/24/21  Symptoms:  Would the patient rather a call back or a response via [facility]? Call   Best Call Back Number:[phone]  Additional Information:   Pt states that her b/p is high and was told by Dr. Wellington to be seen by primary asap.

## 2020-12-24 ENCOUNTER — TELEPHONE (OUTPATIENT)
Dept: CARDIOLOGY | Facility: CLINIC | Age: 70
End: 2020-12-24

## 2020-12-24 DIAGNOSIS — I10 ESSENTIAL HYPERTENSION: ICD-10-CM

## 2020-12-24 NOTE — TELEPHONE ENCOUNTER
Home health nurse is concerned about her elevated blood pressures. She is digital med program please review thanks pb    ----- Message from Leanna Carter sent at 12/24/2020 10:28 AM CST -----  Contact: Martha/[facility]  aMrtha is calling to let you know because the patients blood pressure was elevated today 172/96. She said that the patient used the CoNarrative Machine and if you would like to give her a call back you may call her at [phone].    ThanksKT

## 2020-12-28 ENCOUNTER — OFFICE VISIT (OUTPATIENT)
Dept: INTERNAL MEDICINE | Facility: CLINIC | Age: 70
End: 2020-12-28
Payer: MEDICARE

## 2020-12-28 VITALS
BODY MASS INDEX: 43.04 KG/M2 | HEART RATE: 69 BPM | OXYGEN SATURATION: 97 % | HEIGHT: 69 IN | WEIGHT: 290.56 LBS | TEMPERATURE: 96 F | DIASTOLIC BLOOD PRESSURE: 94 MMHG | SYSTOLIC BLOOD PRESSURE: 124 MMHG

## 2020-12-28 DIAGNOSIS — R42 DIZZINESS: ICD-10-CM

## 2020-12-28 DIAGNOSIS — I10 ESSENTIAL HYPERTENSION: ICD-10-CM

## 2020-12-28 DIAGNOSIS — Z17.0 MALIGNANT NEOPLASM OF UPPER-OUTER QUADRANT OF RIGHT BREAST IN FEMALE, ESTROGEN RECEPTOR POSITIVE: Chronic | ICD-10-CM

## 2020-12-28 DIAGNOSIS — C50.411 MALIGNANT NEOPLASM OF UPPER-OUTER QUADRANT OF RIGHT BREAST IN FEMALE, ESTROGEN RECEPTOR POSITIVE: Chronic | ICD-10-CM

## 2020-12-28 DIAGNOSIS — F32.1 MAJOR DEPRESSIVE DISORDER, SINGLE EPISODE, MODERATE WITH ANXIOUS DISTRESS: Primary | ICD-10-CM

## 2020-12-28 PROCEDURE — 99999 PR PBB SHADOW E&M-EST. PATIENT-LVL V: ICD-10-PCS | Mod: PBBFAC,,, | Performed by: NURSE PRACTITIONER

## 2020-12-28 PROCEDURE — 99215 OFFICE O/P EST HI 40 MIN: CPT | Mod: PBBFAC | Performed by: NURSE PRACTITIONER

## 2020-12-28 PROCEDURE — 99214 OFFICE O/P EST MOD 30 MIN: CPT | Mod: S$PBB,,, | Performed by: NURSE PRACTITIONER

## 2020-12-28 PROCEDURE — 99214 PR OFFICE/OUTPT VISIT, EST, LEVL IV, 30-39 MIN: ICD-10-PCS | Mod: S$PBB,,, | Performed by: NURSE PRACTITIONER

## 2020-12-28 PROCEDURE — 99999 PR PBB SHADOW E&M-EST. PATIENT-LVL V: CPT | Mod: PBBFAC,,, | Performed by: NURSE PRACTITIONER

## 2020-12-28 RX ORDER — HYDRALAZINE HYDROCHLORIDE 100 MG/1
100 TABLET, FILM COATED ORAL EVERY 8 HOURS
Qty: 90 TABLET | Refills: 11 | Status: SHIPPED | OUTPATIENT
Start: 2020-12-28 | End: 2021-04-12 | Stop reason: SDUPTHER

## 2020-12-28 RX ORDER — BLOOD PRESSURE TEST KIT-MEDIUM
KIT MISCELLANEOUS
COMMUNITY
Start: 2020-12-21 | End: 2022-02-15 | Stop reason: ALTCHOICE

## 2020-12-28 RX ORDER — ZOLPIDEM TARTRATE 5 MG/1
5 TABLET ORAL NIGHTLY PRN
Qty: 60 TABLET | Refills: 1 | Status: SHIPPED | OUTPATIENT
Start: 2020-12-28 | End: 2021-03-26

## 2020-12-28 RX ORDER — ALPRAZOLAM 0.5 MG/1
0.5 TABLET ORAL 2 TIMES DAILY PRN
Qty: 30 TABLET | Refills: 0 | Status: SHIPPED | OUTPATIENT
Start: 2020-12-28 | End: 2021-01-08 | Stop reason: SDUPTHER

## 2020-12-28 RX ORDER — LIDOCAINE 50 MG/G
1 PATCH TOPICAL DAILY PRN
Qty: 30 PATCH | Refills: 0 | Status: ON HOLD | OUTPATIENT
Start: 2020-12-28 | End: 2021-03-01 | Stop reason: CLARIF

## 2020-12-28 RX ORDER — CLONIDINE HYDROCHLORIDE 0.2 MG/1
0.2 TABLET ORAL 3 TIMES DAILY
Qty: 270 TABLET | Refills: 2 | Status: SHIPPED | OUTPATIENT
Start: 2020-12-28 | End: 2021-05-14 | Stop reason: SDUPTHER

## 2020-12-28 RX ORDER — TRAMADOL HYDROCHLORIDE 50 MG/1
50 TABLET ORAL EVERY 8 HOURS PRN
Qty: 60 TABLET | Refills: 0 | Status: SHIPPED | OUTPATIENT
Start: 2020-12-28 | End: 2021-01-25 | Stop reason: SDUPTHER

## 2020-12-29 ENCOUNTER — TELEPHONE (OUTPATIENT)
Dept: CARDIOLOGY | Facility: CLINIC | Age: 70
End: 2020-12-29

## 2020-12-30 ENCOUNTER — PATIENT MESSAGE (OUTPATIENT)
Dept: ADMINISTRATIVE | Facility: OTHER | Age: 70
End: 2020-12-30

## 2021-01-04 ENCOUNTER — PATIENT MESSAGE (OUTPATIENT)
Dept: PULMONOLOGY | Facility: CLINIC | Age: 71
End: 2021-01-04

## 2021-01-04 ENCOUNTER — TELEPHONE (OUTPATIENT)
Dept: PULMONOLOGY | Facility: CLINIC | Age: 71
End: 2021-01-04

## 2021-01-04 LAB — FUNGUS SPEC CULT: NORMAL

## 2021-01-06 ENCOUNTER — TELEPHONE (OUTPATIENT)
Dept: HEMATOLOGY/ONCOLOGY | Facility: CLINIC | Age: 71
End: 2021-01-06

## 2021-01-08 ENCOUNTER — OFFICE VISIT (OUTPATIENT)
Dept: HEMATOLOGY/ONCOLOGY | Facility: CLINIC | Age: 71
End: 2021-01-08
Payer: MEDICARE

## 2021-01-08 ENCOUNTER — OFFICE VISIT (OUTPATIENT)
Dept: RADIATION ONCOLOGY | Facility: CLINIC | Age: 71
End: 2021-01-08
Payer: MEDICARE

## 2021-01-08 ENCOUNTER — DOCUMENTATION ONLY (OUTPATIENT)
Dept: HEMATOLOGY/ONCOLOGY | Facility: CLINIC | Age: 71
End: 2021-01-08

## 2021-01-08 ENCOUNTER — TUMOR BOARD CONFERENCE (OUTPATIENT)
Dept: RADIATION ONCOLOGY | Facility: CLINIC | Age: 71
End: 2021-01-08

## 2021-01-08 ENCOUNTER — PATIENT MESSAGE (OUTPATIENT)
Dept: HEMATOLOGY/ONCOLOGY | Facility: CLINIC | Age: 71
End: 2021-01-08

## 2021-01-08 VITALS
HEIGHT: 69 IN | WEIGHT: 293 LBS | HEART RATE: 69 BPM | SYSTOLIC BLOOD PRESSURE: 153 MMHG | OXYGEN SATURATION: 99 % | DIASTOLIC BLOOD PRESSURE: 93 MMHG | TEMPERATURE: 98 F | BODY MASS INDEX: 43.4 KG/M2 | RESPIRATION RATE: 17 BRPM

## 2021-01-08 VITALS
HEART RATE: 50 BPM | HEIGHT: 69 IN | SYSTOLIC BLOOD PRESSURE: 129 MMHG | BODY MASS INDEX: 43.4 KG/M2 | OXYGEN SATURATION: 97 % | WEIGHT: 293 LBS | DIASTOLIC BLOOD PRESSURE: 70 MMHG | TEMPERATURE: 98 F

## 2021-01-08 DIAGNOSIS — R59.0 AXILLARY ADENOPATHY: ICD-10-CM

## 2021-01-08 DIAGNOSIS — R45.89 ANXIETY ABOUT HEALTH: Primary | ICD-10-CM

## 2021-01-08 DIAGNOSIS — Z17.0 MALIGNANT NEOPLASM OF UPPER-OUTER QUADRANT OF RIGHT BREAST IN FEMALE, ESTROGEN RECEPTOR POSITIVE: ICD-10-CM

## 2021-01-08 DIAGNOSIS — Z17.0 MALIGNANT NEOPLASM OF UPPER-OUTER QUADRANT OF RIGHT BREAST IN FEMALE, ESTROGEN RECEPTOR POSITIVE: Primary | ICD-10-CM

## 2021-01-08 DIAGNOSIS — C50.411 MALIGNANT NEOPLASM OF UPPER-OUTER QUADRANT OF RIGHT BREAST IN FEMALE, ESTROGEN RECEPTOR POSITIVE: Primary | ICD-10-CM

## 2021-01-08 DIAGNOSIS — M79.621 AXILLARY PAIN, RIGHT: ICD-10-CM

## 2021-01-08 DIAGNOSIS — N64.4 MASTALGIA: ICD-10-CM

## 2021-01-08 DIAGNOSIS — C50.411 MALIGNANT NEOPLASM OF UPPER-OUTER QUADRANT OF RIGHT BREAST IN FEMALE, ESTROGEN RECEPTOR POSITIVE: ICD-10-CM

## 2021-01-08 PROCEDURE — 99215 PR OFFICE/OUTPT VISIT, EST, LEVL V, 40-54 MIN: ICD-10-PCS | Mod: S$PBB,,, | Performed by: INTERNAL MEDICINE

## 2021-01-08 PROCEDURE — 99215 OFFICE O/P EST HI 40 MIN: CPT | Mod: PBBFAC | Performed by: RADIOLOGY

## 2021-01-08 PROCEDURE — 99215 OFFICE O/P EST HI 40 MIN: CPT | Mod: S$PBB,,, | Performed by: INTERNAL MEDICINE

## 2021-01-08 PROCEDURE — 99999 PR PBB SHADOW E&M-EST. PATIENT-LVL V: CPT | Mod: PBBFAC,,, | Performed by: INTERNAL MEDICINE

## 2021-01-08 PROCEDURE — 99214 PR OFFICE/OUTPT VISIT, EST, LEVL IV, 30-39 MIN: ICD-10-PCS | Mod: S$PBB,,, | Performed by: RADIOLOGY

## 2021-01-08 PROCEDURE — 99214 OFFICE O/P EST MOD 30 MIN: CPT | Mod: S$PBB,,, | Performed by: RADIOLOGY

## 2021-01-08 PROCEDURE — 99999 PR PBB SHADOW E&M-EST. PATIENT-LVL V: ICD-10-PCS | Mod: PBBFAC,,, | Performed by: INTERNAL MEDICINE

## 2021-01-08 PROCEDURE — 99999 PR PBB SHADOW E&M-EST. PATIENT-LVL V: ICD-10-PCS | Mod: PBBFAC,,, | Performed by: RADIOLOGY

## 2021-01-08 PROCEDURE — 99215 OFFICE O/P EST HI 40 MIN: CPT | Mod: PBBFAC,27 | Performed by: INTERNAL MEDICINE

## 2021-01-08 PROCEDURE — 99999 PR PBB SHADOW E&M-EST. PATIENT-LVL V: CPT | Mod: PBBFAC,,, | Performed by: RADIOLOGY

## 2021-01-08 RX ORDER — OXYCODONE AND ACETAMINOPHEN 7.5; 325 MG/1; MG/1
1 TABLET ORAL EVERY 6 HOURS PRN
Qty: 20 TABLET | Refills: 0 | Status: SHIPPED | OUTPATIENT
Start: 2021-01-08 | End: 2021-02-11

## 2021-01-08 RX ORDER — ALPRAZOLAM 0.5 MG/1
0.5 TABLET ORAL 2 TIMES DAILY PRN
Qty: 30 TABLET | Refills: 0 | Status: SHIPPED | OUTPATIENT
Start: 2021-01-08 | End: 2021-01-25 | Stop reason: SDUPTHER

## 2021-01-12 ENCOUNTER — SPECIALTY PHARMACY (OUTPATIENT)
Dept: PHARMACY | Facility: CLINIC | Age: 71
End: 2021-01-12

## 2021-01-13 ENCOUNTER — PATIENT MESSAGE (OUTPATIENT)
Dept: HEMATOLOGY/ONCOLOGY | Facility: CLINIC | Age: 71
End: 2021-01-13

## 2021-01-14 ENCOUNTER — OFFICE VISIT (OUTPATIENT)
Dept: SURGERY | Facility: CLINIC | Age: 71
End: 2021-01-14
Payer: MEDICARE

## 2021-01-14 ENCOUNTER — PATIENT MESSAGE (OUTPATIENT)
Dept: RADIATION ONCOLOGY | Facility: CLINIC | Age: 71
End: 2021-01-14

## 2021-01-14 ENCOUNTER — TELEPHONE (OUTPATIENT)
Dept: HEMATOLOGY/ONCOLOGY | Facility: CLINIC | Age: 71
End: 2021-01-14

## 2021-01-14 ENCOUNTER — PATIENT MESSAGE (OUTPATIENT)
Dept: HEMATOLOGY/ONCOLOGY | Facility: CLINIC | Age: 71
End: 2021-01-14

## 2021-01-14 ENCOUNTER — PATIENT MESSAGE (OUTPATIENT)
Dept: NEUROLOGY | Facility: CLINIC | Age: 71
End: 2021-01-14

## 2021-01-14 VITALS
WEIGHT: 293 LBS | DIASTOLIC BLOOD PRESSURE: 94 MMHG | TEMPERATURE: 99 F | HEIGHT: 69 IN | HEART RATE: 68 BPM | SYSTOLIC BLOOD PRESSURE: 148 MMHG | BODY MASS INDEX: 43.4 KG/M2

## 2021-01-14 DIAGNOSIS — K82.8 PORCELAIN GALLBLADDER: Primary | ICD-10-CM

## 2021-01-14 LAB
FINAL PATHOLOGIC DIAGNOSIS: NORMAL
FROZEN SECTION DIAGNOSIS: NORMAL
GROSS: NORMAL
Lab: NORMAL
SUPPLEMENTAL DIAGNOSIS: NORMAL

## 2021-01-14 PROCEDURE — 99024 PR POST-OP FOLLOW-UP VISIT: ICD-10-PCS | Mod: POP,,, | Performed by: SURGERY

## 2021-01-14 PROCEDURE — 99024 POSTOP FOLLOW-UP VISIT: CPT | Mod: POP,,, | Performed by: SURGERY

## 2021-01-14 PROCEDURE — 99999 PR PBB SHADOW E&M-EST. PATIENT-LVL V: ICD-10-PCS | Mod: PBBFAC,,, | Performed by: SURGERY

## 2021-01-14 PROCEDURE — 99215 OFFICE O/P EST HI 40 MIN: CPT | Mod: PBBFAC | Performed by: SURGERY

## 2021-01-14 PROCEDURE — 99999 PR PBB SHADOW E&M-EST. PATIENT-LVL V: CPT | Mod: PBBFAC,,, | Performed by: SURGERY

## 2021-01-15 ENCOUNTER — TELEPHONE (OUTPATIENT)
Dept: HEMATOLOGY/ONCOLOGY | Facility: CLINIC | Age: 71
End: 2021-01-15

## 2021-01-21 ENCOUNTER — OFFICE VISIT (OUTPATIENT)
Dept: PSYCHIATRY | Facility: CLINIC | Age: 71
End: 2021-01-21
Payer: MEDICARE

## 2021-01-21 ENCOUNTER — PATIENT MESSAGE (OUTPATIENT)
Dept: SURGERY | Facility: CLINIC | Age: 71
End: 2021-01-21

## 2021-01-21 DIAGNOSIS — C50.411 MALIGNANT NEOPLASM OF UPPER-OUTER QUADRANT OF RIGHT BREAST IN FEMALE, ESTROGEN RECEPTOR POSITIVE: ICD-10-CM

## 2021-01-21 DIAGNOSIS — Z17.0 MALIGNANT NEOPLASM OF UPPER-OUTER QUADRANT OF RIGHT BREAST IN FEMALE, ESTROGEN RECEPTOR POSITIVE: ICD-10-CM

## 2021-01-21 DIAGNOSIS — F32.1 MAJOR DEPRESSIVE DISORDER, SINGLE EPISODE, MODERATE WITH ANXIOUS DISTRESS: Primary | ICD-10-CM

## 2021-01-21 PROCEDURE — 90834 PSYTX W PT 45 MINUTES: CPT | Mod: 95,,, | Performed by: PSYCHOLOGIST

## 2021-01-21 PROCEDURE — 90834 PR PSYCHOTHERAPY W/PATIENT, 45 MIN: ICD-10-PCS | Mod: 95,,, | Performed by: PSYCHOLOGIST

## 2021-01-25 DIAGNOSIS — Z17.0 MALIGNANT NEOPLASM OF UPPER-OUTER QUADRANT OF RIGHT BREAST IN FEMALE, ESTROGEN RECEPTOR POSITIVE: ICD-10-CM

## 2021-01-25 DIAGNOSIS — Z17.0 MALIGNANT NEOPLASM OF UPPER-OUTER QUADRANT OF RIGHT BREAST IN FEMALE, ESTROGEN RECEPTOR POSITIVE: Chronic | ICD-10-CM

## 2021-01-25 DIAGNOSIS — C50.411 MALIGNANT NEOPLASM OF UPPER-OUTER QUADRANT OF RIGHT BREAST IN FEMALE, ESTROGEN RECEPTOR POSITIVE: Chronic | ICD-10-CM

## 2021-01-25 DIAGNOSIS — C50.411 MALIGNANT NEOPLASM OF UPPER-OUTER QUADRANT OF RIGHT BREAST IN FEMALE, ESTROGEN RECEPTOR POSITIVE: ICD-10-CM

## 2021-01-25 RX ORDER — ALPRAZOLAM 0.5 MG/1
0.5 TABLET ORAL 2 TIMES DAILY PRN
Qty: 15 TABLET | Refills: 0 | Status: SHIPPED | OUTPATIENT
Start: 2021-01-25 | End: 2021-02-16 | Stop reason: SDUPTHER

## 2021-01-28 RX ORDER — TRAMADOL HYDROCHLORIDE 50 MG/1
50 TABLET ORAL EVERY 8 HOURS PRN
Qty: 60 TABLET | Refills: 0 | Status: SHIPPED | OUTPATIENT
Start: 2021-01-28 | End: 2021-02-01 | Stop reason: SDUPTHER

## 2021-02-01 ENCOUNTER — OFFICE VISIT (OUTPATIENT)
Dept: INTERNAL MEDICINE | Facility: CLINIC | Age: 71
End: 2021-02-01
Payer: MEDICARE

## 2021-02-01 VITALS
SYSTOLIC BLOOD PRESSURE: 134 MMHG | WEIGHT: 293 LBS | TEMPERATURE: 96 F | HEART RATE: 95 BPM | OXYGEN SATURATION: 95 % | DIASTOLIC BLOOD PRESSURE: 88 MMHG | BODY MASS INDEX: 43.4 KG/M2 | HEIGHT: 69 IN

## 2021-02-01 DIAGNOSIS — H53.8 BLURRY VISION: ICD-10-CM

## 2021-02-01 DIAGNOSIS — F41.9 ANXIETY: ICD-10-CM

## 2021-02-01 DIAGNOSIS — C50.411 MALIGNANT NEOPLASM OF UPPER-OUTER QUADRANT OF RIGHT BREAST IN FEMALE, ESTROGEN RECEPTOR POSITIVE: Chronic | ICD-10-CM

## 2021-02-01 DIAGNOSIS — I10 ESSENTIAL HYPERTENSION: ICD-10-CM

## 2021-02-01 DIAGNOSIS — M79.89 LEFT ARM SWELLING: ICD-10-CM

## 2021-02-01 DIAGNOSIS — Z78.0 POSTMENOPAUSAL: Primary | ICD-10-CM

## 2021-02-01 DIAGNOSIS — Z17.0 MALIGNANT NEOPLASM OF UPPER-OUTER QUADRANT OF RIGHT BREAST IN FEMALE, ESTROGEN RECEPTOR POSITIVE: Chronic | ICD-10-CM

## 2021-02-01 DIAGNOSIS — Z76.89 ESTABLISHING CARE WITH NEW DOCTOR, ENCOUNTER FOR: ICD-10-CM

## 2021-02-01 DIAGNOSIS — M79.602 PAIN OF LEFT UPPER EXTREMITY: ICD-10-CM

## 2021-02-01 PROCEDURE — 99999 PR PBB SHADOW E&M-EST. PATIENT-LVL V: ICD-10-PCS | Mod: PBBFAC,,, | Performed by: FAMILY MEDICINE

## 2021-02-01 PROCEDURE — 99999 PR PBB SHADOW E&M-EST. PATIENT-LVL V: CPT | Mod: PBBFAC,,, | Performed by: FAMILY MEDICINE

## 2021-02-01 PROCEDURE — 99214 PR OFFICE/OUTPT VISIT, EST, LEVL IV, 30-39 MIN: ICD-10-PCS | Mod: S$PBB,,, | Performed by: FAMILY MEDICINE

## 2021-02-01 PROCEDURE — 99214 OFFICE O/P EST MOD 30 MIN: CPT | Mod: S$PBB,,, | Performed by: FAMILY MEDICINE

## 2021-02-01 PROCEDURE — 99215 OFFICE O/P EST HI 40 MIN: CPT | Mod: PBBFAC | Performed by: FAMILY MEDICINE

## 2021-02-01 RX ORDER — AMLODIPINE BESYLATE 10 MG/1
10 TABLET ORAL DAILY
Qty: 90 TABLET | Refills: 2 | Status: SHIPPED | OUTPATIENT
Start: 2021-02-01 | End: 2021-11-05 | Stop reason: SDUPTHER

## 2021-02-01 RX ORDER — PROPRANOLOL HYDROCHLORIDE 80 MG/1
80 TABLET ORAL 2 TIMES DAILY
Qty: 180 TABLET | Refills: 1 | Status: SHIPPED | OUTPATIENT
Start: 2021-02-01 | End: 2021-02-03 | Stop reason: SDUPTHER

## 2021-02-01 RX ORDER — HYDROXYZINE HYDROCHLORIDE 25 MG/1
25 TABLET, FILM COATED ORAL NIGHTLY
Qty: 60 TABLET | Refills: 0 | Status: SHIPPED | OUTPATIENT
Start: 2021-02-01 | End: 2021-12-29 | Stop reason: SDUPTHER

## 2021-02-01 RX ORDER — TRAMADOL HYDROCHLORIDE 50 MG/1
50 TABLET ORAL EVERY 8 HOURS PRN
Qty: 60 TABLET | Refills: 0 | Status: SHIPPED | OUTPATIENT
Start: 2021-02-01 | End: 2021-02-11 | Stop reason: SDUPTHER

## 2021-02-02 ENCOUNTER — HOSPITAL ENCOUNTER (OUTPATIENT)
Dept: RADIOLOGY | Facility: HOSPITAL | Age: 71
Discharge: HOME | End: 2021-02-02
Attending: INTERNAL MEDICINE
Payer: MEDICARE

## 2021-02-02 ENCOUNTER — OFFICE VISIT (OUTPATIENT)
Dept: HEMATOLOGY/ONCOLOGY | Facility: CLINIC | Age: 71
End: 2021-02-02
Payer: MEDICARE

## 2021-02-02 ENCOUNTER — TELEPHONE (OUTPATIENT)
Dept: RADIOLOGY | Facility: HOSPITAL | Age: 71
End: 2021-02-02

## 2021-02-02 ENCOUNTER — PATIENT OUTREACH (OUTPATIENT)
Dept: ADMINISTRATIVE | Facility: OTHER | Age: 71
End: 2021-02-02

## 2021-02-02 VITALS
SYSTOLIC BLOOD PRESSURE: 189 MMHG | HEART RATE: 54 BPM | TEMPERATURE: 97 F | HEIGHT: 69 IN | WEIGHT: 293 LBS | DIASTOLIC BLOOD PRESSURE: 109 MMHG | BODY MASS INDEX: 43.4 KG/M2 | OXYGEN SATURATION: 96 %

## 2021-02-02 DIAGNOSIS — I10 ESSENTIAL HYPERTENSION: ICD-10-CM

## 2021-02-02 DIAGNOSIS — E66.01 MORBID OBESITY WITH BMI OF 45.0-49.9, ADULT: ICD-10-CM

## 2021-02-02 DIAGNOSIS — R51.9 NONINTRACTABLE HEADACHE, UNSPECIFIED CHRONICITY PATTERN, UNSPECIFIED HEADACHE TYPE: ICD-10-CM

## 2021-02-02 DIAGNOSIS — R22.2 MASS OF CHEST WALL, RIGHT: ICD-10-CM

## 2021-02-02 DIAGNOSIS — Z17.0 MALIGNANT NEOPLASM OF UPPER-OUTER QUADRANT OF RIGHT BREAST IN FEMALE, ESTROGEN RECEPTOR POSITIVE: Chronic | ICD-10-CM

## 2021-02-02 DIAGNOSIS — C50.411 MALIGNANT NEOPLASM OF UPPER-OUTER QUADRANT OF RIGHT BREAST IN FEMALE, ESTROGEN RECEPTOR POSITIVE: Chronic | ICD-10-CM

## 2021-02-02 DIAGNOSIS — R22.2 MASS OF CHEST WALL, RIGHT: Primary | ICD-10-CM

## 2021-02-02 PROCEDURE — 99215 OFFICE O/P EST HI 40 MIN: CPT | Mod: PBBFAC | Performed by: INTERNAL MEDICINE

## 2021-02-02 PROCEDURE — 99214 OFFICE O/P EST MOD 30 MIN: CPT | Mod: S$PBB,,, | Performed by: INTERNAL MEDICINE

## 2021-02-02 PROCEDURE — 25500020 PHARM REV CODE 255: Performed by: INTERNAL MEDICINE

## 2021-02-02 PROCEDURE — 71260 CT THORAX DX C+: CPT | Mod: TC

## 2021-02-02 PROCEDURE — 99214 PR OFFICE/OUTPT VISIT, EST, LEVL IV, 30-39 MIN: ICD-10-PCS | Mod: S$PBB,,, | Performed by: INTERNAL MEDICINE

## 2021-02-02 PROCEDURE — 99999 PR PBB SHADOW E&M-EST. PATIENT-LVL V: CPT | Mod: PBBFAC,,, | Performed by: INTERNAL MEDICINE

## 2021-02-02 PROCEDURE — 99999 PR PBB SHADOW E&M-EST. PATIENT-LVL V: ICD-10-PCS | Mod: PBBFAC,,, | Performed by: INTERNAL MEDICINE

## 2021-02-02 RX ORDER — BUTALBITAL, ACETAMINOPHEN AND CAFFEINE 50; 325; 40 MG/1; MG/1; MG/1
1 CAPSULE ORAL 2 TIMES DAILY PRN
Qty: 60 CAPSULE | Refills: 1 | Status: SHIPPED | OUTPATIENT
Start: 2021-02-02 | End: 2021-02-03 | Stop reason: SDUPTHER

## 2021-02-02 RX ADMIN — IOHEXOL 100 ML: 350 INJECTION, SOLUTION INTRAVENOUS at 05:02

## 2021-02-03 ENCOUNTER — TELEPHONE (OUTPATIENT)
Dept: SURGERY | Facility: CLINIC | Age: 71
End: 2021-02-03

## 2021-02-03 ENCOUNTER — OFFICE VISIT (OUTPATIENT)
Dept: OPHTHALMOLOGY | Facility: CLINIC | Age: 71
End: 2021-02-03
Payer: MEDICARE

## 2021-02-03 ENCOUNTER — TELEPHONE (OUTPATIENT)
Dept: HEMATOLOGY/ONCOLOGY | Facility: CLINIC | Age: 71
End: 2021-02-03

## 2021-02-03 ENCOUNTER — HOSPITAL ENCOUNTER (OUTPATIENT)
Dept: RADIOLOGY | Facility: HOSPITAL | Age: 71
Discharge: HOME | End: 2021-02-03
Attending: FAMILY MEDICINE
Payer: MEDICARE

## 2021-02-03 ENCOUNTER — CLINICAL SUPPORT (OUTPATIENT)
Dept: INTERNAL MEDICINE | Facility: CLINIC | Age: 71
End: 2021-02-03
Payer: MEDICARE

## 2021-02-03 VITALS — SYSTOLIC BLOOD PRESSURE: 148 MMHG | DIASTOLIC BLOOD PRESSURE: 100 MMHG

## 2021-02-03 DIAGNOSIS — H53.2 TRANSIENT DIPLOPIA: ICD-10-CM

## 2021-02-03 DIAGNOSIS — G43.109 OCULAR MIGRAINE: ICD-10-CM

## 2021-02-03 DIAGNOSIS — M79.602 PAIN OF LEFT UPPER EXTREMITY: ICD-10-CM

## 2021-02-03 DIAGNOSIS — R51.9 NONINTRACTABLE HEADACHE, UNSPECIFIED CHRONICITY PATTERN, UNSPECIFIED HEADACHE TYPE: ICD-10-CM

## 2021-02-03 DIAGNOSIS — M79.89 LEFT ARM SWELLING: ICD-10-CM

## 2021-02-03 DIAGNOSIS — Z96.1 PSEUDOPHAKIA OF BOTH EYES: ICD-10-CM

## 2021-02-03 DIAGNOSIS — Z17.0 MALIGNANT NEOPLASM OF UPPER-OUTER QUADRANT OF RIGHT BREAST IN FEMALE, ESTROGEN RECEPTOR POSITIVE: Chronic | ICD-10-CM

## 2021-02-03 DIAGNOSIS — C50.411 MALIGNANT NEOPLASM OF UPPER-OUTER QUADRANT OF RIGHT BREAST IN FEMALE, ESTROGEN RECEPTOR POSITIVE: Chronic | ICD-10-CM

## 2021-02-03 DIAGNOSIS — H53.8 BLURRY VISION: ICD-10-CM

## 2021-02-03 DIAGNOSIS — I10 ESSENTIAL HYPERTENSION: ICD-10-CM

## 2021-02-03 DIAGNOSIS — R51.9 FREQUENT HEADACHES: Primary | ICD-10-CM

## 2021-02-03 DIAGNOSIS — H52.7 REFRACTIVE ERRORS: ICD-10-CM

## 2021-02-03 PROCEDURE — 99999 PR PBB SHADOW E&M-EST. PATIENT-LVL II: ICD-10-PCS | Mod: PBBFAC,,,

## 2021-02-03 PROCEDURE — 93971 EXTREMITY STUDY: CPT | Mod: TC,LT

## 2021-02-03 PROCEDURE — 99212 OFFICE O/P EST SF 10 MIN: CPT | Mod: PBBFAC,25 | Performed by: OPTOMETRIST

## 2021-02-03 PROCEDURE — 99999 PR PBB SHADOW E&M-EST. PATIENT-LVL II: CPT | Mod: PBBFAC,,,

## 2021-02-03 PROCEDURE — 92004 COMPRE OPH EXAM NEW PT 1/>: CPT | Mod: S$PBB,,, | Performed by: OPTOMETRIST

## 2021-02-03 PROCEDURE — 92015 DETERMINE REFRACTIVE STATE: CPT | Mod: ,,, | Performed by: OPTOMETRIST

## 2021-02-03 PROCEDURE — 99212 OFFICE O/P EST SF 10 MIN: CPT | Mod: PBBFAC,25,27

## 2021-02-03 PROCEDURE — 92015 PR REFRACTION: ICD-10-PCS | Mod: ,,, | Performed by: OPTOMETRIST

## 2021-02-03 PROCEDURE — 99999 PR PBB SHADOW E&M-EST. PATIENT-LVL II: CPT | Mod: PBBFAC,,, | Performed by: OPTOMETRIST

## 2021-02-03 PROCEDURE — 99999 PR PBB SHADOW E&M-EST. PATIENT-LVL II: ICD-10-PCS | Mod: PBBFAC,,, | Performed by: OPTOMETRIST

## 2021-02-03 PROCEDURE — 92004 PR EYE EXAM, NEW PATIENT,COMPREHESV: ICD-10-PCS | Mod: S$PBB,,, | Performed by: OPTOMETRIST

## 2021-02-03 PROCEDURE — 93971 EXTREMITY STUDY: CPT | Mod: 26,LT,, | Performed by: RADIOLOGY

## 2021-02-03 PROCEDURE — 93971 US UPPER EXTREMITY VEINS LEFT: ICD-10-PCS | Mod: 26,LT,, | Performed by: RADIOLOGY

## 2021-02-03 RX ORDER — PROPRANOLOL HYDROCHLORIDE 40 MG/1
40 TABLET ORAL 2 TIMES DAILY
Qty: 180 TABLET | Refills: 1 | Status: SHIPPED | OUTPATIENT
Start: 2021-02-03 | End: 2021-03-16 | Stop reason: ALTCHOICE

## 2021-02-03 RX ORDER — BUTALBITAL, ACETAMINOPHEN AND CAFFEINE 50; 325; 40 MG/1; MG/1; MG/1
1 CAPSULE ORAL 2 TIMES DAILY PRN
Qty: 60 CAPSULE | Refills: 1 | Status: SHIPPED | OUTPATIENT
Start: 2021-02-03 | End: 2021-04-01 | Stop reason: SDUPTHER

## 2021-02-04 LAB
ACID FAST MOD KINY STN SPEC: NORMAL
MYCOBACTERIUM SPEC QL CULT: NORMAL

## 2021-02-05 ENCOUNTER — TELEPHONE (OUTPATIENT)
Dept: INTERNAL MEDICINE | Facility: CLINIC | Age: 71
End: 2021-02-05

## 2021-02-05 DIAGNOSIS — I10 UNCONTROLLED HYPERTENSION: ICD-10-CM

## 2021-02-05 RX ORDER — HYDROCHLOROTHIAZIDE 50 MG/1
50 TABLET ORAL DAILY
Qty: 90 TABLET | Refills: 3 | Status: SHIPPED | OUTPATIENT
Start: 2021-02-05 | End: 2021-04-13 | Stop reason: SDUPTHER

## 2021-02-05 RX ORDER — DOXAZOSIN 4 MG/1
4 TABLET ORAL NIGHTLY
Qty: 90 TABLET | Refills: 0 | Status: SHIPPED | OUTPATIENT
Start: 2021-02-05 | End: 2021-12-29 | Stop reason: SDUPTHER

## 2021-02-09 ENCOUNTER — PATIENT OUTREACH (OUTPATIENT)
Dept: ADMINISTRATIVE | Facility: HOSPITAL | Age: 71
End: 2021-02-09

## 2021-02-11 ENCOUNTER — OFFICE VISIT (OUTPATIENT)
Dept: SURGERY | Facility: CLINIC | Age: 71
End: 2021-02-11
Payer: MEDICARE

## 2021-02-11 ENCOUNTER — PATIENT MESSAGE (OUTPATIENT)
Dept: HEMATOLOGY/ONCOLOGY | Facility: CLINIC | Age: 71
End: 2021-02-11

## 2021-02-11 VITALS
DIASTOLIC BLOOD PRESSURE: 98 MMHG | WEIGHT: 293 LBS | RESPIRATION RATE: 18 BRPM | SYSTOLIC BLOOD PRESSURE: 188 MMHG | BODY MASS INDEX: 43.4 KG/M2 | OXYGEN SATURATION: 98 % | HEART RATE: 71 BPM | HEIGHT: 69 IN

## 2021-02-11 DIAGNOSIS — Z17.0 MALIGNANT NEOPLASM OF UPPER-OUTER QUADRANT OF RIGHT BREAST IN FEMALE, ESTROGEN RECEPTOR POSITIVE: Primary | ICD-10-CM

## 2021-02-11 DIAGNOSIS — C50.411 MALIGNANT NEOPLASM OF UPPER-OUTER QUADRANT OF RIGHT BREAST IN FEMALE, ESTROGEN RECEPTOR POSITIVE: Chronic | ICD-10-CM

## 2021-02-11 DIAGNOSIS — C50.411 MALIGNANT NEOPLASM OF UPPER-OUTER QUADRANT OF RIGHT BREAST IN FEMALE, ESTROGEN RECEPTOR POSITIVE: Primary | ICD-10-CM

## 2021-02-11 DIAGNOSIS — Z17.0 MALIGNANT NEOPLASM OF UPPER-OUTER QUADRANT OF RIGHT BREAST IN FEMALE, ESTROGEN RECEPTOR POSITIVE: Chronic | ICD-10-CM

## 2021-02-11 DIAGNOSIS — C50.411 MALIGNANT NEOPLASM OF UPPER-OUTER QUADRANT OF RIGHT BREAST IN FEMALE, ESTROGEN RECEPTOR POSITIVE: ICD-10-CM

## 2021-02-11 DIAGNOSIS — Z17.0 MALIGNANT NEOPLASM OF UPPER-OUTER QUADRANT OF RIGHT BREAST IN FEMALE, ESTROGEN RECEPTOR POSITIVE: ICD-10-CM

## 2021-02-11 PROCEDURE — 99999 PR PBB SHADOW E&M-EST. PATIENT-LVL V: ICD-10-PCS | Mod: PBBFAC,,, | Performed by: SURGERY

## 2021-02-11 PROCEDURE — 99214 OFFICE O/P EST MOD 30 MIN: CPT | Mod: S$PBB,24,, | Performed by: SURGERY

## 2021-02-11 PROCEDURE — 99215 OFFICE O/P EST HI 40 MIN: CPT | Mod: PBBFAC | Performed by: SURGERY

## 2021-02-11 PROCEDURE — 99214 PR OFFICE/OUTPT VISIT, EST, LEVL IV, 30-39 MIN: ICD-10-PCS | Mod: S$PBB,24,, | Performed by: SURGERY

## 2021-02-11 PROCEDURE — 99999 PR PBB SHADOW E&M-EST. PATIENT-LVL V: CPT | Mod: PBBFAC,,, | Performed by: SURGERY

## 2021-02-11 RX ORDER — TRAMADOL HYDROCHLORIDE 50 MG/1
50 TABLET ORAL EVERY 8 HOURS PRN
Qty: 60 TABLET | Refills: 0 | Status: SHIPPED | OUTPATIENT
Start: 2021-02-11 | End: 2021-03-26

## 2021-02-11 RX ORDER — OXYCODONE AND ACETAMINOPHEN 7.5; 325 MG/1; MG/1
1 TABLET ORAL EVERY 6 HOURS PRN
Qty: 30 TABLET | Refills: 0 | Status: SHIPPED | OUTPATIENT
Start: 2021-02-11 | End: 2021-02-11

## 2021-02-11 RX ORDER — OXYCODONE AND ACETAMINOPHEN 7.5; 325 MG/1; MG/1
1 TABLET ORAL EVERY 6 HOURS PRN
Qty: 30 TABLET | Refills: 0 | Status: ON HOLD | OUTPATIENT
Start: 2021-02-11 | End: 2021-03-01 | Stop reason: HOSPADM

## 2021-02-11 RX ORDER — OXYCODONE AND ACETAMINOPHEN 7.5; 325 MG/1; MG/1
1 TABLET ORAL EVERY 6 HOURS PRN
Qty: 30 TABLET | Refills: 0 | Status: SHIPPED | OUTPATIENT
Start: 2021-02-11 | End: 2021-02-11 | Stop reason: ALTCHOICE

## 2021-02-12 ENCOUNTER — DOCUMENTATION ONLY (OUTPATIENT)
Dept: SURGERY | Facility: CLINIC | Age: 71
End: 2021-02-12

## 2021-02-12 ENCOUNTER — SPECIALTY PHARMACY (OUTPATIENT)
Dept: PHARMACY | Facility: CLINIC | Age: 71
End: 2021-02-12

## 2021-02-15 DIAGNOSIS — R22.31 AXILLARY MASS, RIGHT: Primary | ICD-10-CM

## 2021-02-16 DIAGNOSIS — Z17.0 MALIGNANT NEOPLASM OF UPPER-OUTER QUADRANT OF RIGHT BREAST IN FEMALE, ESTROGEN RECEPTOR POSITIVE: ICD-10-CM

## 2021-02-16 DIAGNOSIS — C50.411 MALIGNANT NEOPLASM OF UPPER-OUTER QUADRANT OF RIGHT BREAST IN FEMALE, ESTROGEN RECEPTOR POSITIVE: ICD-10-CM

## 2021-02-17 ENCOUNTER — PATIENT MESSAGE (OUTPATIENT)
Dept: HEMATOLOGY/ONCOLOGY | Facility: CLINIC | Age: 71
End: 2021-02-17

## 2021-02-17 RX ORDER — ALPRAZOLAM 0.5 MG/1
0.5 TABLET ORAL 2 TIMES DAILY PRN
Qty: 15 TABLET | Refills: 0 | Status: SHIPPED | OUTPATIENT
Start: 2021-02-17 | End: 2021-02-25 | Stop reason: SDUPTHER

## 2021-02-18 ENCOUNTER — PATIENT OUTREACH (OUTPATIENT)
Dept: ADMINISTRATIVE | Facility: HOSPITAL | Age: 71
End: 2021-02-18

## 2021-02-23 ENCOUNTER — HOSPITAL ENCOUNTER (OUTPATIENT)
Dept: RADIOLOGY | Facility: HOSPITAL | Age: 71
Discharge: HOME | End: 2021-02-23
Attending: SURGERY
Payer: MEDICARE

## 2021-02-23 DIAGNOSIS — Z17.0 MALIGNANT NEOPLASM OF UPPER-OUTER QUADRANT OF RIGHT BREAST IN FEMALE, ESTROGEN RECEPTOR POSITIVE: ICD-10-CM

## 2021-02-23 DIAGNOSIS — C50.411 MALIGNANT NEOPLASM OF UPPER-OUTER QUADRANT OF RIGHT BREAST IN FEMALE, ESTROGEN RECEPTOR POSITIVE: ICD-10-CM

## 2021-02-23 PROCEDURE — 25500020 PHARM REV CODE 255: Performed by: SURGERY

## 2021-02-23 PROCEDURE — 70543 MRI ORBT/FAC/NCK W/O &W/DYE: CPT | Mod: TC

## 2021-02-23 PROCEDURE — A9585 GADOBUTROL INJECTION: HCPCS | Performed by: SURGERY

## 2021-02-23 RX ORDER — GADOBUTROL 604.72 MG/ML
10 INJECTION INTRAVENOUS
Status: COMPLETED | OUTPATIENT
Start: 2021-02-23 | End: 2021-02-23

## 2021-02-23 RX ADMIN — GADOBUTROL 10 ML: 604.72 INJECTION INTRAVENOUS at 04:02

## 2021-02-25 ENCOUNTER — TELEPHONE (OUTPATIENT)
Dept: HEMATOLOGY/ONCOLOGY | Facility: CLINIC | Age: 71
End: 2021-02-25

## 2021-02-25 ENCOUNTER — LAB VISIT (OUTPATIENT)
Dept: LAB | Facility: HOSPITAL | Age: 71
End: 2021-02-25
Attending: INTERNAL MEDICINE
Payer: MEDICARE

## 2021-02-25 ENCOUNTER — OFFICE VISIT (OUTPATIENT)
Dept: HEMATOLOGY/ONCOLOGY | Facility: CLINIC | Age: 71
End: 2021-02-25
Payer: MEDICARE

## 2021-02-25 VITALS
TEMPERATURE: 98 F | HEART RATE: 63 BPM | WEIGHT: 293 LBS | OXYGEN SATURATION: 100 % | BODY MASS INDEX: 43.4 KG/M2 | SYSTOLIC BLOOD PRESSURE: 144 MMHG | RESPIRATION RATE: 17 BRPM | DIASTOLIC BLOOD PRESSURE: 79 MMHG | HEIGHT: 69 IN

## 2021-02-25 DIAGNOSIS — Z17.0 MALIGNANT NEOPLASM OF UPPER-OUTER QUADRANT OF RIGHT BREAST IN FEMALE, ESTROGEN RECEPTOR POSITIVE: Chronic | ICD-10-CM

## 2021-02-25 DIAGNOSIS — C50.411 MALIGNANT NEOPLASM OF UPPER-OUTER QUADRANT OF RIGHT BREAST IN FEMALE, ESTROGEN RECEPTOR POSITIVE: Chronic | ICD-10-CM

## 2021-02-25 DIAGNOSIS — R51.9 NONINTRACTABLE HEADACHE, UNSPECIFIED CHRONICITY PATTERN, UNSPECIFIED HEADACHE TYPE: ICD-10-CM

## 2021-02-25 LAB
ALBUMIN SERPL BCP-MCNC: 3.2 G/DL (ref 3.5–5.2)
ALP SERPL-CCNC: 143 U/L (ref 55–135)
ALT SERPL W/O P-5'-P-CCNC: 18 U/L (ref 10–44)
ANION GAP SERPL CALC-SCNC: 9 MMOL/L (ref 8–16)
AST SERPL-CCNC: 11 U/L (ref 10–40)
BASOPHILS # BLD AUTO: 0.07 K/UL (ref 0–0.2)
BASOPHILS NFR BLD: 0.9 % (ref 0–1.9)
BILIRUB SERPL-MCNC: 0.3 MG/DL (ref 0.1–1)
BUN SERPL-MCNC: 17 MG/DL (ref 8–23)
CALCIUM SERPL-MCNC: 9.3 MG/DL (ref 8.7–10.5)
CHLORIDE SERPL-SCNC: 108 MMOL/L (ref 95–110)
CO2 SERPL-SCNC: 24 MMOL/L (ref 23–29)
CREAT SERPL-MCNC: 1.1 MG/DL (ref 0.5–1.4)
DIFFERENTIAL METHOD: ABNORMAL
EOSINOPHIL # BLD AUTO: 0.2 K/UL (ref 0–0.5)
EOSINOPHIL NFR BLD: 2.1 % (ref 0–8)
ERYTHROCYTE [DISTWIDTH] IN BLOOD BY AUTOMATED COUNT: 16.1 % (ref 11.5–14.5)
EST. GFR  (AFRICAN AMERICAN): 59 ML/MIN/1.73 M^2
EST. GFR  (NON AFRICAN AMERICAN): 51 ML/MIN/1.73 M^2
GLUCOSE SERPL-MCNC: 67 MG/DL (ref 70–110)
HCT VFR BLD AUTO: 38.4 % (ref 37–48.5)
HGB BLD-MCNC: 11.5 G/DL (ref 12–16)
IMM GRANULOCYTES # BLD AUTO: 0.03 K/UL (ref 0–0.04)
IMM GRANULOCYTES NFR BLD AUTO: 0.4 % (ref 0–0.5)
LYMPHOCYTES # BLD AUTO: 2.1 K/UL (ref 1–4.8)
LYMPHOCYTES NFR BLD: 26.5 % (ref 18–48)
MCH RBC QN AUTO: 24.9 PG (ref 27–31)
MCHC RBC AUTO-ENTMCNC: 29.9 G/DL (ref 32–36)
MCV RBC AUTO: 83 FL (ref 82–98)
MONOCYTES # BLD AUTO: 0.8 K/UL (ref 0.3–1)
MONOCYTES NFR BLD: 9.8 % (ref 4–15)
NEUTROPHILS # BLD AUTO: 4.8 K/UL (ref 1.8–7.7)
NEUTROPHILS NFR BLD: 60.3 % (ref 38–73)
NRBC BLD-RTO: 0 /100 WBC
PLATELET # BLD AUTO: 251 K/UL (ref 150–350)
PMV BLD AUTO: 9.2 FL (ref 9.2–12.9)
POTASSIUM SERPL-SCNC: 4 MMOL/L (ref 3.5–5.1)
PROT SERPL-MCNC: 7.2 G/DL (ref 6–8.4)
RBC # BLD AUTO: 4.62 M/UL (ref 4–5.4)
SODIUM SERPL-SCNC: 141 MMOL/L (ref 136–145)
WBC # BLD AUTO: 7.95 K/UL (ref 3.9–12.7)

## 2021-02-25 PROCEDURE — 85025 COMPLETE CBC W/AUTO DIFF WBC: CPT

## 2021-02-25 PROCEDURE — 36415 COLL VENOUS BLD VENIPUNCTURE: CPT

## 2021-02-25 PROCEDURE — 99215 OFFICE O/P EST HI 40 MIN: CPT | Mod: PBBFAC | Performed by: INTERNAL MEDICINE

## 2021-02-25 PROCEDURE — 99999 PR PBB SHADOW E&M-EST. PATIENT-LVL V: CPT | Mod: PBBFAC,,, | Performed by: INTERNAL MEDICINE

## 2021-02-25 PROCEDURE — G0179 MD RECERTIFICATION HHA PT: HCPCS | Mod: ,,, | Performed by: FAMILY MEDICINE

## 2021-02-25 PROCEDURE — 99215 OFFICE O/P EST HI 40 MIN: CPT | Mod: S$PBB,,, | Performed by: INTERNAL MEDICINE

## 2021-02-25 PROCEDURE — 80053 COMPREHEN METABOLIC PANEL: CPT

## 2021-02-25 PROCEDURE — G0179 PR HOME HEALTH MD RECERTIFICATION: ICD-10-PCS | Mod: ,,, | Performed by: FAMILY MEDICINE

## 2021-02-25 PROCEDURE — 99215 PR OFFICE/OUTPT VISIT, EST, LEVL V, 40-54 MIN: ICD-10-PCS | Mod: S$PBB,,, | Performed by: INTERNAL MEDICINE

## 2021-02-25 PROCEDURE — 99999 PR PBB SHADOW E&M-EST. PATIENT-LVL V: ICD-10-PCS | Mod: PBBFAC,,, | Performed by: INTERNAL MEDICINE

## 2021-02-25 RX ORDER — ALPRAZOLAM 0.5 MG/1
0.5 TABLET ORAL 2 TIMES DAILY PRN
Qty: 30 TABLET | Refills: 0 | Status: SHIPPED | OUTPATIENT
Start: 2021-02-25 | End: 2021-03-15 | Stop reason: SDUPTHER

## 2021-02-26 ENCOUNTER — TELEPHONE (OUTPATIENT)
Dept: SURGERY | Facility: CLINIC | Age: 71
End: 2021-02-26

## 2021-02-26 ENCOUNTER — LAB VISIT (OUTPATIENT)
Dept: OTOLARYNGOLOGY | Facility: CLINIC | Age: 71
End: 2021-02-26
Payer: MEDICARE

## 2021-02-26 DIAGNOSIS — Z17.0 MALIGNANT NEOPLASM OF UPPER-OUTER QUADRANT OF RIGHT BREAST IN FEMALE, ESTROGEN RECEPTOR POSITIVE: Primary | ICD-10-CM

## 2021-02-26 DIAGNOSIS — Z17.0 MALIGNANT NEOPLASM OF UPPER-OUTER QUADRANT OF RIGHT BREAST IN FEMALE, ESTROGEN RECEPTOR POSITIVE: ICD-10-CM

## 2021-02-26 DIAGNOSIS — C50.411 MALIGNANT NEOPLASM OF UPPER-OUTER QUADRANT OF RIGHT BREAST IN FEMALE, ESTROGEN RECEPTOR POSITIVE: ICD-10-CM

## 2021-02-26 DIAGNOSIS — C50.411 MALIGNANT NEOPLASM OF UPPER-OUTER QUADRANT OF RIGHT BREAST IN FEMALE, ESTROGEN RECEPTOR POSITIVE: Primary | ICD-10-CM

## 2021-02-26 PROCEDURE — U0005 INFEC AGEN DETEC AMPLI PROBE: HCPCS

## 2021-02-26 PROCEDURE — U0003 INFECTIOUS AGENT DETECTION BY NUCLEIC ACID (DNA OR RNA); SEVERE ACUTE RESPIRATORY SYNDROME CORONAVIRUS 2 (SARS-COV-2) (CORONAVIRUS DISEASE [COVID-19]), AMPLIFIED PROBE TECHNIQUE, MAKING USE OF HIGH THROUGHPUT TECHNOLOGIES AS DESCRIBED BY CMS-2020-01-R: HCPCS

## 2021-02-27 LAB — SARS-COV-2 RNA RESP QL NAA+PROBE: NOT DETECTED

## 2021-02-28 ENCOUNTER — PATIENT MESSAGE (OUTPATIENT)
Dept: ADMINISTRATIVE | Facility: OTHER | Age: 71
End: 2021-02-28

## 2021-03-01 ENCOUNTER — HOSPITAL ENCOUNTER (OUTPATIENT)
Facility: HOSPITAL | Age: 71
Discharge: HOME | End: 2021-03-03
Attending: EMERGENCY MEDICINE | Admitting: SURGERY
Payer: MEDICARE

## 2021-03-01 ENCOUNTER — TELEPHONE (OUTPATIENT)
Dept: PREADMISSION TESTING | Facility: HOSPITAL | Age: 71
End: 2021-03-01

## 2021-03-01 ENCOUNTER — HOSPITAL ENCOUNTER (OUTPATIENT)
Facility: HOSPITAL | Age: 71
Discharge: OTHER INSTITUTION | End: 2021-03-01
Attending: SURGERY | Admitting: SURGERY
Payer: MEDICARE

## 2021-03-01 VITALS
TEMPERATURE: 98 F | OXYGEN SATURATION: 99 % | HEIGHT: 69 IN | RESPIRATION RATE: 18 BRPM | SYSTOLIC BLOOD PRESSURE: 217 MMHG | BODY MASS INDEX: 43.4 KG/M2 | DIASTOLIC BLOOD PRESSURE: 114 MMHG | HEART RATE: 58 BPM | WEIGHT: 293 LBS

## 2021-03-01 DIAGNOSIS — I10 ESSENTIAL HYPERTENSION: Primary | ICD-10-CM

## 2021-03-01 DIAGNOSIS — R22.30 AXILLARY MASS: ICD-10-CM

## 2021-03-01 DIAGNOSIS — R59.1 LYMPHADENOPATHY: ICD-10-CM

## 2021-03-01 LAB
CTP QC/QA: YES
POCT GLUCOSE: 109 MG/DL (ref 70–110)
SARS-COV-2 RDRP RESP QL NAA+PROBE: NEGATIVE

## 2021-03-01 PROCEDURE — 82962 GLUCOSE BLOOD TEST: CPT | Performed by: SURGERY

## 2021-03-01 PROCEDURE — 96372 THER/PROPH/DIAG INJ SC/IM: CPT | Mod: 59 | Performed by: EMERGENCY MEDICINE

## 2021-03-01 PROCEDURE — 99499 UNLISTED E&M SERVICE: CPT | Mod: ,,, | Performed by: SURGERY

## 2021-03-01 PROCEDURE — 25000003 PHARM REV CODE 250: Performed by: EMERGENCY MEDICINE

## 2021-03-01 PROCEDURE — 99285 EMERGENCY DEPT VISIT HI MDM: CPT | Mod: CS,,, | Performed by: EMERGENCY MEDICINE

## 2021-03-01 PROCEDURE — U0002 COVID-19 LAB TEST NON-CDC: HCPCS | Performed by: STUDENT IN AN ORGANIZED HEALTH CARE EDUCATION/TRAINING PROGRAM

## 2021-03-01 PROCEDURE — 96374 THER/PROPH/DIAG INJ IV PUSH: CPT

## 2021-03-01 PROCEDURE — 63600175 PHARM REV CODE 636 W HCPCS: Performed by: EMERGENCY MEDICINE

## 2021-03-01 PROCEDURE — 25000003 PHARM REV CODE 250: Performed by: STUDENT IN AN ORGANIZED HEALTH CARE EDUCATION/TRAINING PROGRAM

## 2021-03-01 PROCEDURE — 99285 EMERGENCY DEPT VISIT HI MDM: CPT | Mod: 25

## 2021-03-01 PROCEDURE — G0378 HOSPITAL OBSERVATION PER HR: HCPCS

## 2021-03-01 PROCEDURE — 63600175 PHARM REV CODE 636 W HCPCS: Performed by: STUDENT IN AN ORGANIZED HEALTH CARE EDUCATION/TRAINING PROGRAM

## 2021-03-01 PROCEDURE — 99499 NO LOS: ICD-10-PCS | Mod: ,,, | Performed by: SURGERY

## 2021-03-01 PROCEDURE — 86803 HEPATITIS C AB TEST: CPT

## 2021-03-01 PROCEDURE — 99285 PR EMERGENCY DEPT VISIT,LEVEL V: ICD-10-PCS | Mod: CS,,, | Performed by: EMERGENCY MEDICINE

## 2021-03-01 PROCEDURE — 96375 TX/PRO/DX INJ NEW DRUG ADDON: CPT

## 2021-03-01 RX ORDER — SODIUM CHLORIDE 9 MG/ML
INJECTION, SOLUTION INTRAVENOUS CONTINUOUS
Status: DISCONTINUED | OUTPATIENT
Start: 2021-03-01 | End: 2021-03-01

## 2021-03-01 RX ORDER — LIDOCAINE HYDROCHLORIDE 10 MG/ML
1 INJECTION, SOLUTION EPIDURAL; INFILTRATION; INTRACAUDAL; PERINEURAL ONCE
Status: DISCONTINUED | OUTPATIENT
Start: 2021-03-01 | End: 2021-03-01

## 2021-03-01 RX ORDER — MORPHINE SULFATE 4 MG/ML
4 INJECTION, SOLUTION INTRAMUSCULAR; INTRAVENOUS
Status: COMPLETED | OUTPATIENT
Start: 2021-03-01 | End: 2021-03-01

## 2021-03-01 RX ORDER — HYDRALAZINE HYDROCHLORIDE 25 MG/1
100 TABLET, FILM COATED ORAL
Status: COMPLETED | OUTPATIENT
Start: 2021-03-01 | End: 2021-03-01

## 2021-03-01 RX ORDER — PROPRANOLOL HYDROCHLORIDE 10 MG/1
40 TABLET ORAL 2 TIMES DAILY
Status: DISCONTINUED | OUTPATIENT
Start: 2021-03-01 | End: 2021-03-03 | Stop reason: HOSPADM

## 2021-03-01 RX ORDER — CLONIDINE HYDROCHLORIDE 0.1 MG/1
0.2 TABLET ORAL
Status: COMPLETED | OUTPATIENT
Start: 2021-03-01 | End: 2021-03-01

## 2021-03-01 RX ORDER — HYDROCHLOROTHIAZIDE 25 MG/1
50 TABLET ORAL DAILY
Status: DISCONTINUED | OUTPATIENT
Start: 2021-03-02 | End: 2021-03-03 | Stop reason: HOSPADM

## 2021-03-01 RX ORDER — MECLIZINE HCL 12.5 MG 12.5 MG/1
12.5 TABLET ORAL 3 TIMES DAILY PRN
Status: DISCONTINUED | OUTPATIENT
Start: 2021-03-01 | End: 2021-03-03 | Stop reason: HOSPADM

## 2021-03-01 RX ORDER — OXYCODONE AND ACETAMINOPHEN 5; 325 MG/1; MG/1
1 TABLET ORAL EVERY 4 HOURS PRN
Qty: 25 TABLET | Refills: 0 | Status: ON HOLD | OUTPATIENT
Start: 2021-03-01 | End: 2021-03-03 | Stop reason: HOSPADM

## 2021-03-01 RX ORDER — ALPRAZOLAM 0.5 MG/1
0.5 TABLET ORAL 2 TIMES DAILY PRN
Status: DISCONTINUED | OUTPATIENT
Start: 2021-03-01 | End: 2021-03-03 | Stop reason: HOSPADM

## 2021-03-01 RX ORDER — ONDANSETRON 2 MG/ML
4 INJECTION INTRAMUSCULAR; INTRAVENOUS
Status: COMPLETED | OUTPATIENT
Start: 2021-03-01 | End: 2021-03-01

## 2021-03-01 RX ORDER — AMLODIPINE BESYLATE 10 MG/1
10 TABLET ORAL
Status: COMPLETED | OUTPATIENT
Start: 2021-03-01 | End: 2021-03-01

## 2021-03-01 RX ORDER — ENOXAPARIN SODIUM 100 MG/ML
40 INJECTION SUBCUTANEOUS EVERY 12 HOURS
Status: DISCONTINUED | OUTPATIENT
Start: 2021-03-01 | End: 2021-03-02

## 2021-03-01 RX ORDER — ASPIRIN 81 MG/1
81 TABLET ORAL DAILY
Status: DISCONTINUED | OUTPATIENT
Start: 2021-03-02 | End: 2021-03-03 | Stop reason: HOSPADM

## 2021-03-01 RX ORDER — HYDROCHLOROTHIAZIDE 25 MG/1
50 TABLET ORAL
Status: COMPLETED | OUTPATIENT
Start: 2021-03-01 | End: 2021-03-01

## 2021-03-01 RX ORDER — BISMUTH SUBSALICYLATE 525 MG/30ML
15 LIQUID ORAL EVERY 6 HOURS PRN
Status: DISCONTINUED | OUTPATIENT
Start: 2021-03-01 | End: 2021-03-03 | Stop reason: HOSPADM

## 2021-03-01 RX ORDER — ACETAMINOPHEN 500 MG
1000 TABLET ORAL EVERY 8 HOURS
Status: DISCONTINUED | OUTPATIENT
Start: 2021-03-01 | End: 2021-03-03 | Stop reason: HOSPADM

## 2021-03-01 RX ORDER — OXYCODONE HYDROCHLORIDE 5 MG/1
5 TABLET ORAL EVERY 4 HOURS PRN
Status: DISCONTINUED | OUTPATIENT
Start: 2021-03-01 | End: 2021-03-03 | Stop reason: HOSPADM

## 2021-03-01 RX ORDER — TELMISARTAN 80 MG/1
80 TABLET ORAL
Status: COMPLETED | OUTPATIENT
Start: 2021-03-01 | End: 2021-03-01

## 2021-03-01 RX ORDER — ATORVASTATIN CALCIUM 20 MG/1
40 TABLET, FILM COATED ORAL DAILY
Status: DISCONTINUED | OUTPATIENT
Start: 2021-03-02 | End: 2021-03-03 | Stop reason: HOSPADM

## 2021-03-01 RX ORDER — VENLAFAXINE HYDROCHLORIDE 75 MG/1
75 CAPSULE, EXTENDED RELEASE ORAL DAILY
Status: DISCONTINUED | OUTPATIENT
Start: 2021-03-02 | End: 2021-03-03 | Stop reason: HOSPADM

## 2021-03-01 RX ORDER — ZOLPIDEM TARTRATE 5 MG/1
5 TABLET ORAL NIGHTLY PRN
Status: DISCONTINUED | OUTPATIENT
Start: 2021-03-01 | End: 2021-03-01

## 2021-03-01 RX ORDER — GABAPENTIN 300 MG/1
600 CAPSULE ORAL
Status: COMPLETED | OUTPATIENT
Start: 2021-03-01 | End: 2021-03-01

## 2021-03-01 RX ORDER — SODIUM CHLORIDE 0.9 % (FLUSH) 0.9 %
10 SYRINGE (ML) INJECTION
Status: DISCONTINUED | OUTPATIENT
Start: 2021-03-01 | End: 2021-03-03 | Stop reason: HOSPADM

## 2021-03-01 RX ORDER — BUTALBITAL, ACETAMINOPHEN AND CAFFEINE 50; 325; 40 MG/1; MG/1; MG/1
1 TABLET ORAL EVERY 6 HOURS PRN
Status: DISCONTINUED | OUTPATIENT
Start: 2021-03-01 | End: 2021-03-03 | Stop reason: HOSPADM

## 2021-03-01 RX ORDER — DOXAZOSIN 1 MG/1
4 TABLET ORAL NIGHTLY
Status: DISCONTINUED | OUTPATIENT
Start: 2021-03-01 | End: 2021-03-03 | Stop reason: HOSPADM

## 2021-03-01 RX ORDER — CLINDAMYCIN PHOSPHATE 900 MG/50ML
900 INJECTION, SOLUTION INTRAVENOUS
Status: DISCONTINUED | OUTPATIENT
Start: 2021-03-01 | End: 2021-03-01

## 2021-03-01 RX ORDER — TALC
6 POWDER (GRAM) TOPICAL NIGHTLY PRN
Status: DISCONTINUED | OUTPATIENT
Start: 2021-03-01 | End: 2021-03-03 | Stop reason: HOSPADM

## 2021-03-01 RX ORDER — ACETAMINOPHEN 325 MG/1
650 TABLET ORAL EVERY 8 HOURS PRN
Status: DISCONTINUED | OUTPATIENT
Start: 2021-03-01 | End: 2021-03-03 | Stop reason: HOSPADM

## 2021-03-01 RX ORDER — CLONIDINE HYDROCHLORIDE 0.2 MG/1
0.2 TABLET ORAL 3 TIMES DAILY
Status: DISCONTINUED | OUTPATIENT
Start: 2021-03-01 | End: 2021-03-03 | Stop reason: HOSPADM

## 2021-03-01 RX ORDER — OXYCODONE HYDROCHLORIDE 10 MG/1
10 TABLET ORAL EVERY 4 HOURS PRN
Status: DISCONTINUED | OUTPATIENT
Start: 2021-03-01 | End: 2021-03-03 | Stop reason: HOSPADM

## 2021-03-01 RX ORDER — TELMISARTAN 20 MG/1
80 TABLET ORAL DAILY
Status: DISCONTINUED | OUTPATIENT
Start: 2021-03-02 | End: 2021-03-03 | Stop reason: HOSPADM

## 2021-03-01 RX ORDER — AMLODIPINE BESYLATE 10 MG/1
10 TABLET ORAL DAILY
Status: DISCONTINUED | OUTPATIENT
Start: 2021-03-02 | End: 2021-03-03 | Stop reason: HOSPADM

## 2021-03-01 RX ORDER — ALBUTEROL SULFATE 90 UG/1
2 AEROSOL, METERED RESPIRATORY (INHALATION) EVERY 4 HOURS PRN
Status: DISCONTINUED | OUTPATIENT
Start: 2021-03-01 | End: 2021-03-03 | Stop reason: HOSPADM

## 2021-03-01 RX ORDER — HYDRALAZINE HYDROCHLORIDE 50 MG/1
100 TABLET, FILM COATED ORAL EVERY 8 HOURS
Status: DISCONTINUED | OUTPATIENT
Start: 2021-03-01 | End: 2021-03-03 | Stop reason: HOSPADM

## 2021-03-01 RX ORDER — SERTRALINE HYDROCHLORIDE 50 MG/1
50 TABLET, FILM COATED ORAL DAILY
Status: DISCONTINUED | OUTPATIENT
Start: 2021-03-02 | End: 2021-03-03 | Stop reason: HOSPADM

## 2021-03-01 RX ORDER — GABAPENTIN 300 MG/1
600 CAPSULE ORAL 3 TIMES DAILY
Status: DISCONTINUED | OUTPATIENT
Start: 2021-03-01 | End: 2021-03-03 | Stop reason: HOSPADM

## 2021-03-01 RX ORDER — ONDANSETRON 8 MG/1
8 TABLET, ORALLY DISINTEGRATING ORAL EVERY 8 HOURS PRN
Status: DISCONTINUED | OUTPATIENT
Start: 2021-03-01 | End: 2021-03-03 | Stop reason: HOSPADM

## 2021-03-01 RX ADMIN — OXYCODONE HYDROCHLORIDE 10 MG: 10 TABLET ORAL at 08:03

## 2021-03-01 RX ADMIN — PROPRANOLOL HYDROCHLORIDE 40 MG: 10 TABLET ORAL at 08:03

## 2021-03-01 RX ADMIN — ACETAMINOPHEN 1000 MG: 325 TABLET ORAL at 10:03

## 2021-03-01 RX ADMIN — ALPRAZOLAM 0.5 MG: 0.5 TABLET ORAL at 08:03

## 2021-03-01 RX ADMIN — CLONIDINE HYDROCHLORIDE 0.2 MG: 0.2 TABLET ORAL at 08:03

## 2021-03-01 RX ADMIN — GABAPENTIN 600 MG: 300 CAPSULE ORAL at 03:03

## 2021-03-01 RX ADMIN — DOXAZOSIN 4 MG: 1 TABLET ORAL at 08:03

## 2021-03-01 RX ADMIN — HYDRALAZINE HYDROCHLORIDE 100 MG: 25 TABLET, FILM COATED ORAL at 02:03

## 2021-03-01 RX ADMIN — ENOXAPARIN SODIUM 40 MG: 40 INJECTION SUBCUTANEOUS at 08:03

## 2021-03-01 RX ADMIN — TELMISARTAN 80 MG: 80 TABLET ORAL at 05:03

## 2021-03-01 RX ADMIN — CLONIDINE HYDROCHLORIDE 0.2 MG: 0.1 TABLET ORAL at 02:03

## 2021-03-01 RX ADMIN — ONDANSETRON 4 MG: 2 INJECTION INTRAMUSCULAR; INTRAVENOUS at 02:03

## 2021-03-01 RX ADMIN — MORPHINE SULFATE 4 MG: 4 INJECTION INTRAVENOUS at 02:03

## 2021-03-01 RX ADMIN — GABAPENTIN 600 MG: 300 CAPSULE ORAL at 08:03

## 2021-03-01 RX ADMIN — HYDROCHLOROTHIAZIDE 50 MG: 25 TABLET ORAL at 03:03

## 2021-03-01 RX ADMIN — HYDRALAZINE HYDROCHLORIDE 100 MG: 50 TABLET, FILM COATED ORAL at 10:03

## 2021-03-01 RX ADMIN — AMLODIPINE BESYLATE 10 MG: 10 TABLET ORAL at 02:03

## 2021-03-02 ENCOUNTER — ANESTHESIA EVENT (OUTPATIENT)
Dept: SURGERY | Facility: HOSPITAL | Age: 71
End: 2021-03-02
Payer: MEDICARE

## 2021-03-02 ENCOUNTER — ANESTHESIA (OUTPATIENT)
Dept: SURGERY | Facility: HOSPITAL | Age: 71
End: 2021-03-02
Payer: MEDICARE

## 2021-03-02 LAB
ANION GAP SERPL CALC-SCNC: 10 MMOL/L (ref 8–16)
BUN SERPL-MCNC: 14 MG/DL (ref 8–23)
CALCIUM SERPL-MCNC: 8.8 MG/DL (ref 8.7–10.5)
CHLORIDE SERPL-SCNC: 107 MMOL/L (ref 95–110)
CO2 SERPL-SCNC: 22 MMOL/L (ref 23–29)
CREAT SERPL-MCNC: 1.1 MG/DL (ref 0.5–1.4)
EST. GFR  (AFRICAN AMERICAN): 59.2 ML/MIN/1.73 M^2
EST. GFR  (NON AFRICAN AMERICAN): 51.3 ML/MIN/1.73 M^2
GLUCOSE SERPL-MCNC: 103 MG/DL (ref 70–110)
POTASSIUM SERPL-SCNC: 3.9 MMOL/L (ref 3.5–5.1)
SODIUM SERPL-SCNC: 139 MMOL/L (ref 136–145)

## 2021-03-02 PROCEDURE — 88189 FLOWCYTOMETRY/READ 16 & >: CPT | Mod: ,,, | Performed by: PATHOLOGY

## 2021-03-02 PROCEDURE — 88342 IMHCHEM/IMCYTCHM 1ST ANTB: CPT | Mod: 26,59,, | Performed by: PATHOLOGY

## 2021-03-02 PROCEDURE — 38525 BIOPSY/REMOVAL LYMPH NODES: CPT | Mod: 78,RT,, | Performed by: SURGERY

## 2021-03-02 PROCEDURE — 88341 IMHCHEM/IMCYTCHM EA ADD ANTB: CPT | Mod: 26,59,, | Performed by: PATHOLOGY

## 2021-03-02 PROCEDURE — 37000008 HC ANESTHESIA 1ST 15 MINUTES: Performed by: SURGERY

## 2021-03-02 PROCEDURE — D9220A PRA ANESTHESIA: Mod: CRNA,,, | Performed by: NURSE ANESTHETIST, CERTIFIED REGISTERED

## 2021-03-02 PROCEDURE — 71000015 HC POSTOP RECOV 1ST HR: Performed by: SURGERY

## 2021-03-02 PROCEDURE — 88307 TISSUE EXAM BY PATHOLOGIST: CPT | Mod: 26,,, | Performed by: PATHOLOGY

## 2021-03-02 PROCEDURE — 38525 PR BIOPSY/REM LYMPH NODES, AXILLARY: ICD-10-PCS | Mod: 78,RT,, | Performed by: SURGERY

## 2021-03-02 PROCEDURE — 88341 PR IHC OR ICC EACH ADD'L SINGLE ANTIBODY  STAINPR: ICD-10-PCS | Mod: 26,59,, | Performed by: PATHOLOGY

## 2021-03-02 PROCEDURE — 88184 FLOWCYTOMETRY/ TC 1 MARKER: CPT | Performed by: PATHOLOGY

## 2021-03-02 PROCEDURE — D9220A PRA ANESTHESIA: ICD-10-PCS | Mod: CRNA,,, | Performed by: NURSE ANESTHETIST, CERTIFIED REGISTERED

## 2021-03-02 PROCEDURE — G0378 HOSPITAL OBSERVATION PER HR: HCPCS

## 2021-03-02 PROCEDURE — 25000003 PHARM REV CODE 250: Performed by: STUDENT IN AN ORGANIZED HEALTH CARE EDUCATION/TRAINING PROGRAM

## 2021-03-02 PROCEDURE — 71000033 HC RECOVERY, INTIAL HOUR: Performed by: SURGERY

## 2021-03-02 PROCEDURE — 88341 IMHCHEM/IMCYTCHM EA ADD ANTB: CPT | Performed by: PATHOLOGY

## 2021-03-02 PROCEDURE — 88185 FLOWCYTOMETRY/TC ADD-ON: CPT | Performed by: PATHOLOGY

## 2021-03-02 PROCEDURE — D9220A PRA ANESTHESIA: ICD-10-PCS | Mod: ANES,,, | Performed by: ANESTHESIOLOGY

## 2021-03-02 PROCEDURE — D9220A PRA ANESTHESIA: Mod: ANES,,, | Performed by: ANESTHESIOLOGY

## 2021-03-02 PROCEDURE — 88307 TISSUE EXAM BY PATHOLOGIST: CPT | Performed by: PATHOLOGY

## 2021-03-02 PROCEDURE — 63600175 PHARM REV CODE 636 W HCPCS: Performed by: STUDENT IN AN ORGANIZED HEALTH CARE EDUCATION/TRAINING PROGRAM

## 2021-03-02 PROCEDURE — 88189 PR  FLOWCYTOMETRY/READ, 16 & > MARKERS: ICD-10-PCS | Mod: ,,, | Performed by: PATHOLOGY

## 2021-03-02 PROCEDURE — 25000003 PHARM REV CODE 250: Performed by: NURSE ANESTHETIST, CERTIFIED REGISTERED

## 2021-03-02 PROCEDURE — 36000706: Performed by: SURGERY

## 2021-03-02 PROCEDURE — 88342 IMHCHEM/IMCYTCHM 1ST ANTB: CPT | Mod: 59 | Performed by: PATHOLOGY

## 2021-03-02 PROCEDURE — 27201423 OPTIME MED/SURG SUP & DEVICES STERILE SUPPLY: Performed by: SURGERY

## 2021-03-02 PROCEDURE — 37000009 HC ANESTHESIA EA ADD 15 MINS: Performed by: SURGERY

## 2021-03-02 PROCEDURE — 88342 CHG IMMUNOCYTOCHEMISTRY: ICD-10-PCS | Mod: 26,59,, | Performed by: PATHOLOGY

## 2021-03-02 PROCEDURE — 96375 TX/PRO/DX INJ NEW DRUG ADDON: CPT | Performed by: EMERGENCY MEDICINE

## 2021-03-02 PROCEDURE — 36415 COLL VENOUS BLD VENIPUNCTURE: CPT

## 2021-03-02 PROCEDURE — 63600175 PHARM REV CODE 636 W HCPCS: Performed by: NURSE ANESTHETIST, CERTIFIED REGISTERED

## 2021-03-02 PROCEDURE — 88307 PR  SURG PATH,LEVEL V: ICD-10-PCS | Mod: 26,,, | Performed by: PATHOLOGY

## 2021-03-02 PROCEDURE — 36000707: Performed by: SURGERY

## 2021-03-02 PROCEDURE — 80048 BASIC METABOLIC PNL TOTAL CA: CPT

## 2021-03-02 RX ORDER — LORAZEPAM 2 MG/ML
0.25 INJECTION INTRAMUSCULAR ONCE AS NEEDED
Status: DISCONTINUED | OUTPATIENT
Start: 2021-03-02 | End: 2021-03-02

## 2021-03-02 RX ORDER — HYDROMORPHONE HYDROCHLORIDE 1 MG/ML
0.2 INJECTION, SOLUTION INTRAMUSCULAR; INTRAVENOUS; SUBCUTANEOUS EVERY 5 MIN PRN
Status: DISCONTINUED | OUTPATIENT
Start: 2021-03-02 | End: 2021-03-02

## 2021-03-02 RX ORDER — MIDAZOLAM HYDROCHLORIDE 1 MG/ML
INJECTION, SOLUTION INTRAMUSCULAR; INTRAVENOUS
Status: DISCONTINUED | OUTPATIENT
Start: 2021-03-02 | End: 2021-03-02

## 2021-03-02 RX ORDER — METOPROLOL TARTRATE 1 MG/ML
INJECTION, SOLUTION INTRAVENOUS
Status: DISCONTINUED | OUTPATIENT
Start: 2021-03-02 | End: 2021-03-02

## 2021-03-02 RX ORDER — FENTANYL CITRATE 50 UG/ML
INJECTION, SOLUTION INTRAMUSCULAR; INTRAVENOUS
Status: DISCONTINUED | OUTPATIENT
Start: 2021-03-02 | End: 2021-03-02

## 2021-03-02 RX ORDER — SODIUM CHLORIDE 9 MG/ML
INJECTION, SOLUTION INTRAVENOUS CONTINUOUS
Status: DISCONTINUED | OUTPATIENT
Start: 2021-03-02 | End: 2021-03-02

## 2021-03-02 RX ORDER — MEPERIDINE HYDROCHLORIDE 50 MG/ML
12.5 INJECTION INTRAMUSCULAR; INTRAVENOUS; SUBCUTANEOUS ONCE AS NEEDED
Status: DISCONTINUED | OUTPATIENT
Start: 2021-03-02 | End: 2021-03-02

## 2021-03-02 RX ORDER — FAMOTIDINE 10 MG/ML
INJECTION INTRAVENOUS
Status: DISCONTINUED | OUTPATIENT
Start: 2021-03-02 | End: 2021-03-02

## 2021-03-02 RX ORDER — LABETALOL HYDROCHLORIDE 5 MG/ML
INJECTION, SOLUTION INTRAVENOUS
Status: DISCONTINUED | OUTPATIENT
Start: 2021-03-02 | End: 2021-03-02

## 2021-03-02 RX ORDER — DEXAMETHASONE SODIUM PHOSPHATE 4 MG/ML
INJECTION, SOLUTION INTRA-ARTICULAR; INTRALESIONAL; INTRAMUSCULAR; INTRAVENOUS; SOFT TISSUE
Status: DISCONTINUED | OUTPATIENT
Start: 2021-03-02 | End: 2021-03-02

## 2021-03-02 RX ORDER — CLINDAMYCIN PHOSPHATE 900 MG/50ML
INJECTION, SOLUTION INTRAVENOUS
Status: DISCONTINUED | OUTPATIENT
Start: 2021-03-02 | End: 2021-03-02

## 2021-03-02 RX ORDER — ONDANSETRON 2 MG/ML
INJECTION INTRAMUSCULAR; INTRAVENOUS
Status: DISCONTINUED | OUTPATIENT
Start: 2021-03-02 | End: 2021-03-02

## 2021-03-02 RX ORDER — LIDOCAINE HYDROCHLORIDE 20 MG/ML
INJECTION INTRAVENOUS
Status: DISCONTINUED | OUTPATIENT
Start: 2021-03-02 | End: 2021-03-02

## 2021-03-02 RX ORDER — PROPOFOL 10 MG/ML
VIAL (ML) INTRAVENOUS
Status: DISCONTINUED | OUTPATIENT
Start: 2021-03-02 | End: 2021-03-02

## 2021-03-02 RX ORDER — HYDROMORPHONE HYDROCHLORIDE 1 MG/ML
0.5 INJECTION, SOLUTION INTRAMUSCULAR; INTRAVENOUS; SUBCUTANEOUS EVERY 6 HOURS PRN
Status: DISCONTINUED | OUTPATIENT
Start: 2021-03-02 | End: 2021-03-03 | Stop reason: HOSPADM

## 2021-03-02 RX ORDER — ROCURONIUM BROMIDE 10 MG/ML
INJECTION, SOLUTION INTRAVENOUS
Status: DISCONTINUED | OUTPATIENT
Start: 2021-03-02 | End: 2021-03-02

## 2021-03-02 RX ORDER — ACETAMINOPHEN 10 MG/ML
INJECTION, SOLUTION INTRAVENOUS
Status: DISCONTINUED | OUTPATIENT
Start: 2021-03-02 | End: 2021-03-02

## 2021-03-02 RX ADMIN — OXYCODONE HYDROCHLORIDE 10 MG: 10 TABLET ORAL at 05:03

## 2021-03-02 RX ADMIN — METOROPROLOL TARTRATE 5 MG: 5 INJECTION, SOLUTION INTRAVENOUS at 04:03

## 2021-03-02 RX ADMIN — OXYCODONE 5 MG: 5 TABLET ORAL at 05:03

## 2021-03-02 RX ADMIN — ROCURONIUM BROMIDE 50 MG: 10 INJECTION, SOLUTION INTRAVENOUS at 02:03

## 2021-03-02 RX ADMIN — ACETAMINOPHEN 1000 MG: 325 TABLET ORAL at 09:03

## 2021-03-02 RX ADMIN — CLONIDINE HYDROCHLORIDE 0.2 MG: 0.2 TABLET ORAL at 09:03

## 2021-03-02 RX ADMIN — ONDANSETRON 4 MG: 2 INJECTION, SOLUTION INTRAMUSCULAR; INTRAVENOUS at 03:03

## 2021-03-02 RX ADMIN — METOROPROLOL TARTRATE 5 MG: 5 INJECTION, SOLUTION INTRAVENOUS at 05:03

## 2021-03-02 RX ADMIN — PROPOFOL 175 MG: 10 INJECTION, EMULSION INTRAVENOUS at 02:03

## 2021-03-02 RX ADMIN — FENTANYL CITRATE 50 MCG: 50 INJECTION, SOLUTION INTRAMUSCULAR; INTRAVENOUS at 03:03

## 2021-03-02 RX ADMIN — CLONIDINE HYDROCHLORIDE 0.2 MG: 0.2 TABLET ORAL at 11:03

## 2021-03-02 RX ADMIN — HYDROMORPHONE HYDROCHLORIDE 0.5 MG: 1 INJECTION, SOLUTION INTRAMUSCULAR; INTRAVENOUS; SUBCUTANEOUS at 07:03

## 2021-03-02 RX ADMIN — PROPRANOLOL HYDROCHLORIDE 40 MG: 10 TABLET ORAL at 09:03

## 2021-03-02 RX ADMIN — FENTANYL CITRATE 100 MCG: 50 INJECTION, SOLUTION INTRAMUSCULAR; INTRAVENOUS at 02:03

## 2021-03-02 RX ADMIN — SUGAMMADEX 400 MG: 100 INJECTION, SOLUTION INTRAVENOUS at 04:03

## 2021-03-02 RX ADMIN — DEXAMETHASONE SODIUM PHOSPHATE 4 MG: 4 INJECTION, SOLUTION INTRAMUSCULAR; INTRAVENOUS at 03:03

## 2021-03-02 RX ADMIN — HYDRALAZINE HYDROCHLORIDE 100 MG: 50 TABLET, FILM COATED ORAL at 05:03

## 2021-03-02 RX ADMIN — DOXAZOSIN 4 MG: 1 TABLET ORAL at 09:03

## 2021-03-02 RX ADMIN — MIDAZOLAM HYDROCHLORIDE 2 MG: 1 INJECTION, SOLUTION INTRAMUSCULAR; INTRAVENOUS at 02:03

## 2021-03-02 RX ADMIN — HYDRALAZINE HYDROCHLORIDE 100 MG: 50 TABLET, FILM COATED ORAL at 09:03

## 2021-03-02 RX ADMIN — FENTANYL CITRATE 50 MCG: 50 INJECTION, SOLUTION INTRAMUSCULAR; INTRAVENOUS at 04:03

## 2021-03-02 RX ADMIN — OXYCODONE HYDROCHLORIDE 10 MG: 10 TABLET ORAL at 09:03

## 2021-03-02 RX ADMIN — GABAPENTIN 600 MG: 300 CAPSULE ORAL at 05:03

## 2021-03-02 RX ADMIN — Medication 5 MG: at 05:03

## 2021-03-02 RX ADMIN — ACETAMINOPHEN 1000 MG: 325 TABLET ORAL at 05:03

## 2021-03-02 RX ADMIN — FAMOTIDINE 20 MG: 10 INJECTION, SOLUTION INTRAVENOUS at 03:03

## 2021-03-02 RX ADMIN — SODIUM CHLORIDE: 0.9 INJECTION, SOLUTION INTRAVENOUS at 02:03

## 2021-03-02 RX ADMIN — CLINDAMYCIN PHOSPHATE 900 MG: 18 INJECTION, SOLUTION INTRAVENOUS at 03:03

## 2021-03-02 RX ADMIN — ACETAMINOPHEN 1000 MG: 10 INJECTION, SOLUTION INTRAVENOUS at 03:03

## 2021-03-02 RX ADMIN — ROCURONIUM BROMIDE 50 MG: 10 INJECTION, SOLUTION INTRAVENOUS at 03:03

## 2021-03-02 RX ADMIN — HYDROCHLOROTHIAZIDE 50 MG: 25 TABLET ORAL at 11:03

## 2021-03-02 RX ADMIN — GLYCOPYRROLATE 0.2 MCG: 0.2 INJECTION, SOLUTION INTRAMUSCULAR; INTRAVITREAL at 03:03

## 2021-03-02 RX ADMIN — LIDOCAINE HYDROCHLORIDE 100 MG: 20 INJECTION, SOLUTION INTRAVENOUS at 02:03

## 2021-03-03 VITALS
TEMPERATURE: 96 F | RESPIRATION RATE: 15 BRPM | OXYGEN SATURATION: 97 % | DIASTOLIC BLOOD PRESSURE: 92 MMHG | SYSTOLIC BLOOD PRESSURE: 168 MMHG | HEART RATE: 77 BPM | HEIGHT: 66 IN | WEIGHT: 293 LBS | BODY MASS INDEX: 47.09 KG/M2

## 2021-03-03 LAB — HCV AB SERPL QL IA: NEGATIVE

## 2021-03-03 PROCEDURE — 25000003 PHARM REV CODE 250: Performed by: STUDENT IN AN ORGANIZED HEALTH CARE EDUCATION/TRAINING PROGRAM

## 2021-03-03 PROCEDURE — G0378 HOSPITAL OBSERVATION PER HR: HCPCS

## 2021-03-03 RX ORDER — OXYCODONE AND ACETAMINOPHEN 5; 325 MG/1; MG/1
1 TABLET ORAL EVERY 4 HOURS PRN
Qty: 18 TABLET | Refills: 0 | Status: SHIPPED | OUTPATIENT
Start: 2021-03-03 | End: 2021-03-25

## 2021-03-03 RX ADMIN — ALPRAZOLAM 0.5 MG: 0.5 TABLET ORAL at 12:03

## 2021-03-03 RX ADMIN — HYDRALAZINE HYDROCHLORIDE 100 MG: 50 TABLET, FILM COATED ORAL at 01:03

## 2021-03-03 RX ADMIN — ATORVASTATIN CALCIUM 40 MG: 20 TABLET, FILM COATED ORAL at 08:03

## 2021-03-03 RX ADMIN — OXYCODONE HYDROCHLORIDE 10 MG: 10 TABLET ORAL at 12:03

## 2021-03-03 RX ADMIN — HYDRALAZINE HYDROCHLORIDE 100 MG: 50 TABLET, FILM COATED ORAL at 06:03

## 2021-03-03 RX ADMIN — OXYCODONE HYDROCHLORIDE 10 MG: 10 TABLET ORAL at 08:03

## 2021-03-03 RX ADMIN — TELMISARTAN 80 MG: 20 TABLET ORAL at 08:03

## 2021-03-03 RX ADMIN — HYDROCHLOROTHIAZIDE 50 MG: 25 TABLET ORAL at 08:03

## 2021-03-03 RX ADMIN — ASPIRIN 81 MG: 81 TABLET, COATED ORAL at 08:03

## 2021-03-03 RX ADMIN — GABAPENTIN 600 MG: 300 CAPSULE ORAL at 08:03

## 2021-03-03 RX ADMIN — OXYCODONE HYDROCHLORIDE 10 MG: 10 TABLET ORAL at 06:03

## 2021-03-03 RX ADMIN — ACETAMINOPHEN 1000 MG: 325 TABLET ORAL at 01:03

## 2021-03-03 RX ADMIN — CLONIDINE HYDROCHLORIDE 0.2 MG: 0.2 TABLET ORAL at 08:03

## 2021-03-03 RX ADMIN — SERTRALINE HYDROCHLORIDE 50 MG: 50 TABLET ORAL at 08:03

## 2021-03-03 RX ADMIN — AMLODIPINE BESYLATE 10 MG: 10 TABLET ORAL at 08:03

## 2021-03-03 RX ADMIN — ACETAMINOPHEN 1000 MG: 325 TABLET ORAL at 06:03

## 2021-03-04 LAB
FLOW CYTOMETRY ANTIBODIES ANALYZED - TISSUE: NORMAL
FLOW CYTOMETRY COMMENT - TISSUE: NORMAL
FLOW CYTOMETRY INTERPRETATION - TISSUE: NORMAL
SPECIMEN TYPE - TISSUE: NORMAL

## 2021-03-05 ENCOUNTER — PATIENT MESSAGE (OUTPATIENT)
Dept: INTERNAL MEDICINE | Facility: CLINIC | Age: 71
End: 2021-03-05

## 2021-03-05 LAB
COMMENT: NORMAL
FINAL PATHOLOGIC DIAGNOSIS: NORMAL
GROSS: NORMAL
Lab: NORMAL
MICROSCOPIC EXAM: NORMAL

## 2021-03-08 ENCOUNTER — TELEPHONE (OUTPATIENT)
Dept: HEMATOLOGY/ONCOLOGY | Facility: CLINIC | Age: 71
End: 2021-03-08

## 2021-03-09 ENCOUNTER — DOCUMENT SCAN (OUTPATIENT)
Dept: HOME HEALTH SERVICES | Facility: HOSPITAL | Age: 71
End: 2021-03-09
Payer: MEDICARE

## 2021-03-11 ENCOUNTER — OFFICE VISIT (OUTPATIENT)
Dept: SURGERY | Facility: CLINIC | Age: 71
End: 2021-03-11
Payer: MEDICARE

## 2021-03-11 VITALS
HEART RATE: 54 BPM | WEIGHT: 293 LBS | DIASTOLIC BLOOD PRESSURE: 52 MMHG | RESPIRATION RATE: 18 BRPM | BODY MASS INDEX: 47.09 KG/M2 | HEIGHT: 66 IN | SYSTOLIC BLOOD PRESSURE: 91 MMHG | OXYGEN SATURATION: 94 %

## 2021-03-11 DIAGNOSIS — R59.0 AXILLARY ADENOPATHY: Primary | ICD-10-CM

## 2021-03-11 PROCEDURE — 99999 PR PBB SHADOW E&M-EST. PATIENT-LVL V: ICD-10-PCS | Mod: PBBFAC,,, | Performed by: SURGERY

## 2021-03-11 PROCEDURE — 99024 POSTOP FOLLOW-UP VISIT: CPT | Mod: POP,,, | Performed by: SURGERY

## 2021-03-11 PROCEDURE — 99215 OFFICE O/P EST HI 40 MIN: CPT | Mod: PBBFAC | Performed by: SURGERY

## 2021-03-11 PROCEDURE — 99024 PR POST-OP FOLLOW-UP VISIT: ICD-10-PCS | Mod: POP,,, | Performed by: SURGERY

## 2021-03-11 PROCEDURE — 99999 PR PBB SHADOW E&M-EST. PATIENT-LVL V: CPT | Mod: PBBFAC,,, | Performed by: SURGERY

## 2021-03-12 ENCOUNTER — SPECIALTY PHARMACY (OUTPATIENT)
Dept: PHARMACY | Facility: CLINIC | Age: 71
End: 2021-03-12

## 2021-03-12 DIAGNOSIS — C50.411 MALIGNANT NEOPLASM OF UPPER-OUTER QUADRANT OF RIGHT BREAST IN FEMALE, ESTROGEN RECEPTOR POSITIVE: Primary | ICD-10-CM

## 2021-03-12 DIAGNOSIS — Z17.0 MALIGNANT NEOPLASM OF UPPER-OUTER QUADRANT OF RIGHT BREAST IN FEMALE, ESTROGEN RECEPTOR POSITIVE: Primary | ICD-10-CM

## 2021-03-15 ENCOUNTER — PATIENT MESSAGE (OUTPATIENT)
Dept: PHARMACY | Facility: CLINIC | Age: 71
End: 2021-03-15

## 2021-03-15 DIAGNOSIS — C50.411 MALIGNANT NEOPLASM OF UPPER-OUTER QUADRANT OF RIGHT BREAST IN FEMALE, ESTROGEN RECEPTOR POSITIVE: Chronic | ICD-10-CM

## 2021-03-15 DIAGNOSIS — Z17.0 MALIGNANT NEOPLASM OF UPPER-OUTER QUADRANT OF RIGHT BREAST IN FEMALE, ESTROGEN RECEPTOR POSITIVE: Chronic | ICD-10-CM

## 2021-03-15 RX ORDER — ALPRAZOLAM 0.5 MG/1
0.5 TABLET ORAL 2 TIMES DAILY PRN
Qty: 30 TABLET | Refills: 0 | Status: SHIPPED | OUTPATIENT
Start: 2021-03-15 | End: 2021-04-01 | Stop reason: SDUPTHER

## 2021-03-18 ENCOUNTER — TELEPHONE (OUTPATIENT)
Dept: INTERNAL MEDICINE | Facility: CLINIC | Age: 71
End: 2021-03-18

## 2021-03-19 ENCOUNTER — DOCUMENT SCAN (OUTPATIENT)
Dept: HOME HEALTH SERVICES | Facility: HOSPITAL | Age: 71
End: 2021-03-19
Payer: MEDICARE

## 2021-03-22 ENCOUNTER — PATIENT MESSAGE (OUTPATIENT)
Dept: ADMINISTRATIVE | Facility: OTHER | Age: 71
End: 2021-03-22

## 2021-03-24 ENCOUNTER — PATIENT MESSAGE (OUTPATIENT)
Dept: HEMATOLOGY/ONCOLOGY | Facility: CLINIC | Age: 71
End: 2021-03-24

## 2021-03-25 ENCOUNTER — LAB VISIT (OUTPATIENT)
Dept: LAB | Facility: HOSPITAL | Age: 71
End: 2021-03-25
Attending: INTERNAL MEDICINE
Payer: MEDICARE

## 2021-03-25 ENCOUNTER — OFFICE VISIT (OUTPATIENT)
Dept: HEMATOLOGY/ONCOLOGY | Facility: CLINIC | Age: 71
End: 2021-03-25
Payer: MEDICARE

## 2021-03-25 VITALS
HEART RATE: 62 BPM | BODY MASS INDEX: 43.4 KG/M2 | SYSTOLIC BLOOD PRESSURE: 157 MMHG | OXYGEN SATURATION: 99 % | WEIGHT: 293 LBS | HEIGHT: 69 IN | TEMPERATURE: 97 F | DIASTOLIC BLOOD PRESSURE: 91 MMHG

## 2021-03-25 DIAGNOSIS — M81.6 LOCALIZED OSTEOPOROSIS (LEQUESNE): ICD-10-CM

## 2021-03-25 DIAGNOSIS — C50.411 MALIGNANT NEOPLASM OF UPPER-OUTER QUADRANT OF RIGHT BREAST IN FEMALE, ESTROGEN RECEPTOR POSITIVE: Chronic | ICD-10-CM

## 2021-03-25 DIAGNOSIS — I10 ESSENTIAL HYPERTENSION: Primary | ICD-10-CM

## 2021-03-25 DIAGNOSIS — Z17.0 MALIGNANT NEOPLASM OF UPPER-OUTER QUADRANT OF RIGHT BREAST IN FEMALE, ESTROGEN RECEPTOR POSITIVE: Chronic | ICD-10-CM

## 2021-03-25 LAB
ALBUMIN SERPL BCP-MCNC: 2.8 G/DL (ref 3.5–5.2)
ALP SERPL-CCNC: 130 U/L (ref 55–135)
ALT SERPL W/O P-5'-P-CCNC: 12 U/L (ref 10–44)
ANION GAP SERPL CALC-SCNC: 9 MMOL/L (ref 8–16)
AST SERPL-CCNC: 13 U/L (ref 10–40)
BASOPHILS # BLD AUTO: 0.05 K/UL (ref 0–0.2)
BASOPHILS NFR BLD: 0.8 % (ref 0–1.9)
BILIRUB SERPL-MCNC: 0.2 MG/DL (ref 0.1–1)
BUN SERPL-MCNC: 20 MG/DL (ref 8–23)
CALCIUM SERPL-MCNC: 9.1 MG/DL (ref 8.7–10.5)
CHLORIDE SERPL-SCNC: 111 MMOL/L (ref 95–110)
CO2 SERPL-SCNC: 24 MMOL/L (ref 23–29)
CREAT SERPL-MCNC: 1 MG/DL (ref 0.5–1.4)
DIFFERENTIAL METHOD: ABNORMAL
EOSINOPHIL # BLD AUTO: 0.3 K/UL (ref 0–0.5)
EOSINOPHIL NFR BLD: 5.4 % (ref 0–8)
ERYTHROCYTE [DISTWIDTH] IN BLOOD BY AUTOMATED COUNT: 16.4 % (ref 11.5–14.5)
EST. GFR  (AFRICAN AMERICAN): >60 ML/MIN/1.73 M^2
EST. GFR  (NON AFRICAN AMERICAN): 58 ML/MIN/1.73 M^2
GLUCOSE SERPL-MCNC: 106 MG/DL (ref 70–110)
HCT VFR BLD AUTO: 33.4 % (ref 37–48.5)
HGB BLD-MCNC: 10 G/DL (ref 12–16)
IMM GRANULOCYTES # BLD AUTO: 0.02 K/UL (ref 0–0.04)
IMM GRANULOCYTES NFR BLD AUTO: 0.3 % (ref 0–0.5)
LYMPHOCYTES # BLD AUTO: 1.6 K/UL (ref 1–4.8)
LYMPHOCYTES NFR BLD: 24.9 % (ref 18–48)
MCH RBC QN AUTO: 24.7 PG (ref 27–31)
MCHC RBC AUTO-ENTMCNC: 29.9 G/DL (ref 32–36)
MCV RBC AUTO: 83 FL (ref 82–98)
MONOCYTES # BLD AUTO: 0.5 K/UL (ref 0.3–1)
MONOCYTES NFR BLD: 8.3 % (ref 4–15)
NEUTROPHILS # BLD AUTO: 3.8 K/UL (ref 1.8–7.7)
NEUTROPHILS NFR BLD: 60.3 % (ref 38–73)
NRBC BLD-RTO: 0 /100 WBC
PLATELET # BLD AUTO: 250 K/UL (ref 150–350)
PMV BLD AUTO: 9.7 FL (ref 9.2–12.9)
POTASSIUM SERPL-SCNC: 4.2 MMOL/L (ref 3.5–5.1)
PROT SERPL-MCNC: 6.6 G/DL (ref 6–8.4)
RBC # BLD AUTO: 4.05 M/UL (ref 4–5.4)
SODIUM SERPL-SCNC: 144 MMOL/L (ref 136–145)
WBC # BLD AUTO: 6.35 K/UL (ref 3.9–12.7)

## 2021-03-25 PROCEDURE — 36415 COLL VENOUS BLD VENIPUNCTURE: CPT | Performed by: INTERNAL MEDICINE

## 2021-03-25 PROCEDURE — 99999 PR PBB SHADOW E&M-EST. PATIENT-LVL III: ICD-10-PCS | Mod: PBBFAC,,, | Performed by: INTERNAL MEDICINE

## 2021-03-25 PROCEDURE — 99215 PR OFFICE/OUTPT VISIT, EST, LEVL V, 40-54 MIN: ICD-10-PCS | Mod: S$PBB,,, | Performed by: INTERNAL MEDICINE

## 2021-03-25 PROCEDURE — 85025 COMPLETE CBC W/AUTO DIFF WBC: CPT | Performed by: INTERNAL MEDICINE

## 2021-03-25 PROCEDURE — 99215 OFFICE O/P EST HI 40 MIN: CPT | Mod: S$PBB,,, | Performed by: INTERNAL MEDICINE

## 2021-03-25 PROCEDURE — 80053 COMPREHEN METABOLIC PANEL: CPT | Performed by: INTERNAL MEDICINE

## 2021-03-25 PROCEDURE — 99999 PR PBB SHADOW E&M-EST. PATIENT-LVL III: CPT | Mod: PBBFAC,,, | Performed by: INTERNAL MEDICINE

## 2021-03-25 PROCEDURE — 99213 OFFICE O/P EST LOW 20 MIN: CPT | Mod: PBBFAC | Performed by: INTERNAL MEDICINE

## 2021-03-25 RX ORDER — OXYCODONE AND ACETAMINOPHEN 7.5; 325 MG/1; MG/1
1 TABLET ORAL EVERY 4 HOURS PRN
Qty: 90 TABLET | Refills: 0 | Status: SHIPPED | OUTPATIENT
Start: 2021-03-25 | End: 2021-05-13 | Stop reason: SDUPTHER

## 2021-03-26 ENCOUNTER — OFFICE VISIT (OUTPATIENT)
Dept: INTERNAL MEDICINE | Facility: CLINIC | Age: 71
End: 2021-03-26
Payer: MEDICARE

## 2021-03-26 VITALS
HEIGHT: 69 IN | TEMPERATURE: 97 F | DIASTOLIC BLOOD PRESSURE: 82 MMHG | WEIGHT: 293 LBS | BODY MASS INDEX: 43.4 KG/M2 | SYSTOLIC BLOOD PRESSURE: 128 MMHG | RESPIRATION RATE: 18 BRPM | OXYGEN SATURATION: 99 % | HEART RATE: 81 BPM

## 2021-03-26 DIAGNOSIS — C50.411 MALIGNANT NEOPLASM OF UPPER-OUTER QUADRANT OF RIGHT BREAST IN FEMALE, ESTROGEN RECEPTOR POSITIVE: Chronic | ICD-10-CM

## 2021-03-26 DIAGNOSIS — R06.02 SHORTNESS OF BREATH ON EXERTION: ICD-10-CM

## 2021-03-26 DIAGNOSIS — I10 ESSENTIAL HYPERTENSION: Primary | ICD-10-CM

## 2021-03-26 DIAGNOSIS — Z17.0 MALIGNANT NEOPLASM OF UPPER-OUTER QUADRANT OF RIGHT BREAST IN FEMALE, ESTROGEN RECEPTOR POSITIVE: Chronic | ICD-10-CM

## 2021-03-26 PROCEDURE — 99999 PR PBB SHADOW E&M-EST. PATIENT-LVL V: CPT | Mod: PBBFAC,,, | Performed by: FAMILY MEDICINE

## 2021-03-26 PROCEDURE — 99214 OFFICE O/P EST MOD 30 MIN: CPT | Mod: S$PBB,,, | Performed by: FAMILY MEDICINE

## 2021-03-26 PROCEDURE — 99999 PR PBB SHADOW E&M-EST. PATIENT-LVL V: ICD-10-PCS | Mod: PBBFAC,,, | Performed by: FAMILY MEDICINE

## 2021-03-26 PROCEDURE — 99215 OFFICE O/P EST HI 40 MIN: CPT | Mod: PBBFAC | Performed by: FAMILY MEDICINE

## 2021-03-26 PROCEDURE — 99214 PR OFFICE/OUTPT VISIT, EST, LEVL IV, 30-39 MIN: ICD-10-PCS | Mod: S$PBB,,, | Performed by: FAMILY MEDICINE

## 2021-03-26 RX ORDER — ALBUTEROL SULFATE 90 UG/1
2 AEROSOL, METERED RESPIRATORY (INHALATION) EVERY 4 HOURS PRN
Qty: 18 G | Refills: 11 | Status: SHIPPED | OUTPATIENT
Start: 2021-03-26 | End: 2021-12-29 | Stop reason: SDUPTHER

## 2021-03-26 RX ORDER — ZOLPIDEM TARTRATE 5 MG/1
5 TABLET ORAL NIGHTLY PRN
Qty: 60 TABLET | Refills: 1 | Status: SHIPPED | OUTPATIENT
Start: 2021-03-26 | End: 2021-12-16 | Stop reason: SDUPTHER

## 2021-03-26 RX ORDER — DULOXETIN HYDROCHLORIDE 30 MG/1
30 CAPSULE, DELAYED RELEASE ORAL DAILY
Qty: 90 CAPSULE | Refills: 1 | Status: SHIPPED | OUTPATIENT
Start: 2021-03-26 | End: 2021-04-01

## 2021-03-30 ENCOUNTER — PATIENT MESSAGE (OUTPATIENT)
Dept: INTERNAL MEDICINE | Facility: CLINIC | Age: 71
End: 2021-03-30

## 2021-03-31 ENCOUNTER — TELEPHONE (OUTPATIENT)
Dept: HEMATOLOGY/ONCOLOGY | Facility: CLINIC | Age: 71
End: 2021-03-31

## 2021-03-31 ENCOUNTER — TELEPHONE (OUTPATIENT)
Dept: INTERNAL MEDICINE | Facility: CLINIC | Age: 71
End: 2021-03-31

## 2021-03-31 PROCEDURE — 99457 RPM TX MGMT 1ST 20 MIN: CPT | Mod: S$PBB,,, | Performed by: INTERNAL MEDICINE

## 2021-03-31 PROCEDURE — 99457 PR MONITORING, PHYSIOL PARAM, REMOTE, 1ST 20 MINS, PER MONTH: ICD-10-PCS | Mod: S$PBB,,, | Performed by: INTERNAL MEDICINE

## 2021-04-01 ENCOUNTER — OFFICE VISIT (OUTPATIENT)
Dept: INTERNAL MEDICINE | Facility: CLINIC | Age: 71
End: 2021-04-01
Payer: MEDICARE

## 2021-04-01 VITALS
WEIGHT: 293 LBS | SYSTOLIC BLOOD PRESSURE: 140 MMHG | RESPIRATION RATE: 18 BRPM | OXYGEN SATURATION: 99 % | BODY MASS INDEX: 43.4 KG/M2 | HEART RATE: 92 BPM | DIASTOLIC BLOOD PRESSURE: 72 MMHG | HEIGHT: 69 IN | TEMPERATURE: 96 F

## 2021-04-01 DIAGNOSIS — F41.9 ANXIETY: Primary | ICD-10-CM

## 2021-04-01 DIAGNOSIS — C50.411 MALIGNANT NEOPLASM OF UPPER-OUTER QUADRANT OF RIGHT BREAST IN FEMALE, ESTROGEN RECEPTOR POSITIVE: Chronic | ICD-10-CM

## 2021-04-01 DIAGNOSIS — R51.9 NONINTRACTABLE HEADACHE, UNSPECIFIED CHRONICITY PATTERN, UNSPECIFIED HEADACHE TYPE: ICD-10-CM

## 2021-04-01 DIAGNOSIS — Z17.0 MALIGNANT NEOPLASM OF UPPER-OUTER QUADRANT OF RIGHT BREAST IN FEMALE, ESTROGEN RECEPTOR POSITIVE: Chronic | ICD-10-CM

## 2021-04-01 PROCEDURE — 99215 OFFICE O/P EST HI 40 MIN: CPT | Mod: PBBFAC | Performed by: FAMILY MEDICINE

## 2021-04-01 PROCEDURE — 99214 OFFICE O/P EST MOD 30 MIN: CPT | Mod: S$PBB,,, | Performed by: FAMILY MEDICINE

## 2021-04-01 PROCEDURE — 99999 PR PBB SHADOW E&M-EST. PATIENT-LVL V: ICD-10-PCS | Mod: PBBFAC,,, | Performed by: FAMILY MEDICINE

## 2021-04-01 PROCEDURE — 99999 PR PBB SHADOW E&M-EST. PATIENT-LVL V: CPT | Mod: PBBFAC,,, | Performed by: FAMILY MEDICINE

## 2021-04-01 PROCEDURE — 99214 PR OFFICE/OUTPT VISIT, EST, LEVL IV, 30-39 MIN: ICD-10-PCS | Mod: S$PBB,,, | Performed by: FAMILY MEDICINE

## 2021-04-01 RX ORDER — VENLAFAXINE HYDROCHLORIDE 150 MG/1
150 CAPSULE, EXTENDED RELEASE ORAL DAILY
Qty: 30 CAPSULE | Refills: 11 | Status: SHIPPED | OUTPATIENT
Start: 2021-04-01 | End: 2021-09-23

## 2021-04-01 RX ORDER — ALPRAZOLAM 0.5 MG/1
0.5 TABLET ORAL 2 TIMES DAILY PRN
Qty: 30 TABLET | Refills: 0 | Status: SHIPPED | OUTPATIENT
Start: 2021-04-01 | End: 2021-04-29

## 2021-04-01 RX ORDER — BUTALBITAL, ACETAMINOPHEN AND CAFFEINE 50; 325; 40 MG/1; MG/1; MG/1
1 CAPSULE ORAL 2 TIMES DAILY PRN
Qty: 60 CAPSULE | Refills: 1 | Status: SHIPPED | OUTPATIENT
Start: 2021-04-01 | End: 2021-07-15 | Stop reason: SDUPTHER

## 2021-04-05 ENCOUNTER — OFFICE VISIT (OUTPATIENT)
Dept: PSYCHIATRY | Facility: CLINIC | Age: 71
End: 2021-04-05
Payer: MEDICARE

## 2021-04-05 DIAGNOSIS — Z17.0 MALIGNANT NEOPLASM OF UPPER-OUTER QUADRANT OF RIGHT BREAST IN FEMALE, ESTROGEN RECEPTOR POSITIVE: ICD-10-CM

## 2021-04-05 DIAGNOSIS — C50.411 MALIGNANT NEOPLASM OF UPPER-OUTER QUADRANT OF RIGHT BREAST IN FEMALE, ESTROGEN RECEPTOR POSITIVE: Chronic | ICD-10-CM

## 2021-04-05 DIAGNOSIS — F54 PSYCHOLOGICAL FACTORS AFFECTING MEDICAL CONDITION: ICD-10-CM

## 2021-04-05 DIAGNOSIS — F32.1 MAJOR DEPRESSIVE DISORDER, SINGLE EPISODE, MODERATE WITH ANXIOUS DISTRESS: Primary | ICD-10-CM

## 2021-04-05 DIAGNOSIS — Z17.0 MALIGNANT NEOPLASM OF UPPER-OUTER QUADRANT OF RIGHT BREAST IN FEMALE, ESTROGEN RECEPTOR POSITIVE: Chronic | ICD-10-CM

## 2021-04-05 DIAGNOSIS — C50.411 MALIGNANT NEOPLASM OF UPPER-OUTER QUADRANT OF RIGHT BREAST IN FEMALE, ESTROGEN RECEPTOR POSITIVE: ICD-10-CM

## 2021-04-05 PROCEDURE — 90832 PR PSYCHOTHERAPY W/PATIENT, 30 MIN: ICD-10-PCS | Mod: 95,,, | Performed by: PSYCHOLOGIST

## 2021-04-05 PROCEDURE — 90832 PSYTX W PT 30 MINUTES: CPT | Mod: 95,,, | Performed by: PSYCHOLOGIST

## 2021-04-05 RX ORDER — GABAPENTIN 600 MG/1
600 TABLET ORAL 3 TIMES DAILY
Qty: 90 TABLET | Refills: 3 | Status: SHIPPED | OUTPATIENT
Start: 2021-04-05 | End: 2021-05-14 | Stop reason: SDUPTHER

## 2021-04-07 ENCOUNTER — PATIENT OUTREACH (OUTPATIENT)
Dept: ADMINISTRATIVE | Facility: HOSPITAL | Age: 71
End: 2021-04-07

## 2021-04-09 ENCOUNTER — PATIENT MESSAGE (OUTPATIENT)
Dept: HEMATOLOGY/ONCOLOGY | Facility: CLINIC | Age: 71
End: 2021-04-09

## 2021-04-09 ENCOUNTER — SPECIALTY PHARMACY (OUTPATIENT)
Dept: PHARMACY | Facility: CLINIC | Age: 71
End: 2021-04-09

## 2021-04-12 ENCOUNTER — TELEPHONE (OUTPATIENT)
Dept: RADIOLOGY | Facility: HOSPITAL | Age: 71
End: 2021-04-12

## 2021-04-13 DIAGNOSIS — I10 UNCONTROLLED HYPERTENSION: ICD-10-CM

## 2021-04-14 ENCOUNTER — HOSPITAL ENCOUNTER (OUTPATIENT)
Dept: RADIOLOGY | Facility: HOSPITAL | Age: 71
Discharge: HOME | End: 2021-04-14
Attending: INTERNAL MEDICINE
Payer: MEDICARE

## 2021-04-14 ENCOUNTER — CLINICAL SUPPORT (OUTPATIENT)
Dept: INTERNAL MEDICINE | Facility: CLINIC | Age: 71
End: 2021-04-14
Payer: MEDICARE

## 2021-04-14 VITALS — SYSTOLIC BLOOD PRESSURE: 112 MMHG | DIASTOLIC BLOOD PRESSURE: 76 MMHG | HEART RATE: 52 BPM

## 2021-04-14 DIAGNOSIS — R59.0 AXILLARY ADENOPATHY: ICD-10-CM

## 2021-04-14 DIAGNOSIS — C50.411 MALIGNANT NEOPLASM OF UPPER-OUTER QUADRANT OF RIGHT BREAST IN FEMALE, ESTROGEN RECEPTOR POSITIVE: ICD-10-CM

## 2021-04-14 DIAGNOSIS — Z17.0 MALIGNANT NEOPLASM OF UPPER-OUTER QUADRANT OF RIGHT BREAST IN FEMALE, ESTROGEN RECEPTOR POSITIVE: ICD-10-CM

## 2021-04-14 PROCEDURE — 78815 NM PET CT ROUTINE: ICD-10-PCS | Mod: 26,PS,, | Performed by: RADIOLOGY

## 2021-04-14 PROCEDURE — 78815 PET IMAGE W/CT SKULL-THIGH: CPT | Mod: 26,PS,, | Performed by: RADIOLOGY

## 2021-04-14 PROCEDURE — 99212 OFFICE O/P EST SF 10 MIN: CPT | Mod: PBBFAC,25

## 2021-04-14 PROCEDURE — 99999 PR PBB SHADOW E&M-EST. PATIENT-LVL II: ICD-10-PCS | Mod: PBBFAC,,,

## 2021-04-14 PROCEDURE — 99999 PR PBB SHADOW E&M-EST. PATIENT-LVL II: CPT | Mod: PBBFAC,,,

## 2021-04-14 PROCEDURE — 78815 PET IMAGE W/CT SKULL-THIGH: CPT | Mod: TC

## 2021-04-16 ENCOUNTER — OFFICE VISIT (OUTPATIENT)
Dept: RADIATION ONCOLOGY | Facility: CLINIC | Age: 71
End: 2021-04-16
Payer: MEDICARE

## 2021-04-16 VITALS
OXYGEN SATURATION: 98 % | TEMPERATURE: 97 F | HEIGHT: 69 IN | HEART RATE: 53 BPM | BODY MASS INDEX: 43.4 KG/M2 | WEIGHT: 293 LBS | DIASTOLIC BLOOD PRESSURE: 75 MMHG | RESPIRATION RATE: 18 BRPM | SYSTOLIC BLOOD PRESSURE: 122 MMHG

## 2021-04-16 DIAGNOSIS — N64.4 MASTALGIA: ICD-10-CM

## 2021-04-16 DIAGNOSIS — C50.411 MALIGNANT NEOPLASM OF UPPER-OUTER QUADRANT OF RIGHT BREAST IN FEMALE, ESTROGEN RECEPTOR POSITIVE: Primary | ICD-10-CM

## 2021-04-16 DIAGNOSIS — Z17.0 MALIGNANT NEOPLASM OF UPPER-OUTER QUADRANT OF RIGHT BREAST IN FEMALE, ESTROGEN RECEPTOR POSITIVE: Primary | ICD-10-CM

## 2021-04-16 PROCEDURE — 99999 PR PBB SHADOW E&M-EST. PATIENT-LVL V: ICD-10-PCS | Mod: PBBFAC,,, | Performed by: RADIOLOGY

## 2021-04-16 PROCEDURE — 99214 PR OFFICE/OUTPT VISIT, EST, LEVL IV, 30-39 MIN: ICD-10-PCS | Mod: S$PBB,,, | Performed by: RADIOLOGY

## 2021-04-16 PROCEDURE — 99215 OFFICE O/P EST HI 40 MIN: CPT | Mod: PBBFAC | Performed by: RADIOLOGY

## 2021-04-16 PROCEDURE — 99214 OFFICE O/P EST MOD 30 MIN: CPT | Mod: S$PBB,,, | Performed by: RADIOLOGY

## 2021-04-16 PROCEDURE — 99999 PR PBB SHADOW E&M-EST. PATIENT-LVL V: CPT | Mod: PBBFAC,,, | Performed by: RADIOLOGY

## 2021-04-16 RX ORDER — HYDROCHLOROTHIAZIDE 50 MG/1
50 TABLET ORAL DAILY
Qty: 90 TABLET | Refills: 3 | Status: SHIPPED | OUTPATIENT
Start: 2021-04-16 | End: 2021-12-29 | Stop reason: SDUPTHER

## 2021-04-21 ENCOUNTER — TELEPHONE (OUTPATIENT)
Dept: HEMATOLOGY/ONCOLOGY | Facility: CLINIC | Age: 71
End: 2021-04-21

## 2021-04-26 PROCEDURE — G0180 MD CERTIFICATION HHA PATIENT: HCPCS | Mod: ,,, | Performed by: FAMILY MEDICINE

## 2021-04-26 PROCEDURE — G0180 PR HOME HEALTH MD CERTIFICATION: ICD-10-PCS | Mod: ,,, | Performed by: FAMILY MEDICINE

## 2021-04-28 ENCOUNTER — OFFICE VISIT (OUTPATIENT)
Dept: PSYCHIATRY | Facility: CLINIC | Age: 71
End: 2021-04-28
Payer: MEDICARE

## 2021-04-28 DIAGNOSIS — F54 PSYCHOLOGICAL FACTORS AFFECTING MEDICAL CONDITION: ICD-10-CM

## 2021-04-28 DIAGNOSIS — Z17.0 MALIGNANT NEOPLASM OF UPPER-OUTER QUADRANT OF RIGHT BREAST IN FEMALE, ESTROGEN RECEPTOR POSITIVE: ICD-10-CM

## 2021-04-28 DIAGNOSIS — F32.1 MAJOR DEPRESSIVE DISORDER, SINGLE EPISODE, MODERATE WITH ANXIOUS DISTRESS: Primary | ICD-10-CM

## 2021-04-28 DIAGNOSIS — C50.411 MALIGNANT NEOPLASM OF UPPER-OUTER QUADRANT OF RIGHT BREAST IN FEMALE, ESTROGEN RECEPTOR POSITIVE: ICD-10-CM

## 2021-04-28 PROCEDURE — 90832 PSYTX W PT 30 MINUTES: CPT | Mod: 95,,, | Performed by: PSYCHOLOGIST

## 2021-04-28 PROCEDURE — 90832 PR PSYCHOTHERAPY W/PATIENT, 30 MIN: ICD-10-PCS | Mod: 95,,, | Performed by: PSYCHOLOGIST

## 2021-04-29 ENCOUNTER — OFFICE VISIT (OUTPATIENT)
Dept: HEMATOLOGY/ONCOLOGY | Facility: CLINIC | Age: 71
End: 2021-04-29
Payer: MEDICARE

## 2021-04-29 ENCOUNTER — LAB VISIT (OUTPATIENT)
Dept: LAB | Facility: HOSPITAL | Age: 71
End: 2021-04-29
Attending: INTERNAL MEDICINE
Payer: MEDICARE

## 2021-04-29 ENCOUNTER — EXTERNAL HOME HEALTH (OUTPATIENT)
Dept: HOME HEALTH SERVICES | Facility: HOSPITAL | Age: 71
End: 2021-04-29
Payer: MEDICARE

## 2021-04-29 VITALS
HEIGHT: 69 IN | SYSTOLIC BLOOD PRESSURE: 141 MMHG | BODY MASS INDEX: 43.4 KG/M2 | TEMPERATURE: 98 F | HEART RATE: 48 BPM | WEIGHT: 293 LBS | DIASTOLIC BLOOD PRESSURE: 85 MMHG | OXYGEN SATURATION: 99 %

## 2021-04-29 DIAGNOSIS — F41.9 ANXIETY: ICD-10-CM

## 2021-04-29 DIAGNOSIS — Z17.0 MALIGNANT NEOPLASM OF UPPER-OUTER QUADRANT OF RIGHT BREAST IN FEMALE, ESTROGEN RECEPTOR POSITIVE: Chronic | ICD-10-CM

## 2021-04-29 DIAGNOSIS — C50.411 MALIGNANT NEOPLASM OF UPPER-OUTER QUADRANT OF RIGHT BREAST IN FEMALE, ESTROGEN RECEPTOR POSITIVE: Chronic | ICD-10-CM

## 2021-04-29 LAB
ALBUMIN SERPL BCP-MCNC: 3.3 G/DL (ref 3.5–5.2)
ALP SERPL-CCNC: 144 U/L (ref 55–135)
ALT SERPL W/O P-5'-P-CCNC: 37 U/L (ref 10–44)
ANION GAP SERPL CALC-SCNC: 7 MMOL/L (ref 8–16)
AST SERPL-CCNC: 26 U/L (ref 10–40)
BASOPHILS # BLD AUTO: 0.07 K/UL (ref 0–0.2)
BASOPHILS NFR BLD: 0.9 % (ref 0–1.9)
BILIRUB SERPL-MCNC: 0.2 MG/DL (ref 0.1–1)
BUN SERPL-MCNC: 16 MG/DL (ref 8–23)
CALCIUM SERPL-MCNC: 9.2 MG/DL (ref 8.7–10.5)
CHLORIDE SERPL-SCNC: 108 MMOL/L (ref 95–110)
CO2 SERPL-SCNC: 26 MMOL/L (ref 23–29)
CREAT SERPL-MCNC: 1.1 MG/DL (ref 0.5–1.4)
DIFFERENTIAL METHOD: ABNORMAL
EOSINOPHIL # BLD AUTO: 0.2 K/UL (ref 0–0.5)
EOSINOPHIL NFR BLD: 1.8 % (ref 0–8)
ERYTHROCYTE [DISTWIDTH] IN BLOOD BY AUTOMATED COUNT: 16.6 % (ref 11.5–14.5)
EST. GFR  (AFRICAN AMERICAN): 59 ML/MIN/1.73 M^2
EST. GFR  (NON AFRICAN AMERICAN): 51 ML/MIN/1.73 M^2
GLUCOSE SERPL-MCNC: 104 MG/DL (ref 70–110)
HCT VFR BLD AUTO: 38 % (ref 37–48.5)
HGB BLD-MCNC: 11.5 G/DL (ref 12–16)
IMM GRANULOCYTES # BLD AUTO: 0.03 K/UL (ref 0–0.04)
IMM GRANULOCYTES NFR BLD AUTO: 0.4 % (ref 0–0.5)
LYMPHOCYTES # BLD AUTO: 2.4 K/UL (ref 1–4.8)
LYMPHOCYTES NFR BLD: 29.1 % (ref 18–48)
MCH RBC QN AUTO: 24.8 PG (ref 27–31)
MCHC RBC AUTO-ENTMCNC: 30.3 G/DL (ref 32–36)
MCV RBC AUTO: 82 FL (ref 82–98)
MONOCYTES # BLD AUTO: 0.7 K/UL (ref 0.3–1)
MONOCYTES NFR BLD: 9 % (ref 4–15)
NEUTROPHILS # BLD AUTO: 4.8 K/UL (ref 1.8–7.7)
NEUTROPHILS NFR BLD: 58.8 % (ref 38–73)
NRBC BLD-RTO: 0 /100 WBC
PLATELET # BLD AUTO: 256 K/UL (ref 150–450)
PMV BLD AUTO: 9.3 FL (ref 9.2–12.9)
POTASSIUM SERPL-SCNC: 4.5 MMOL/L (ref 3.5–5.1)
PROT SERPL-MCNC: 7.4 G/DL (ref 6–8.4)
RBC # BLD AUTO: 4.63 M/UL (ref 4–5.4)
SODIUM SERPL-SCNC: 141 MMOL/L (ref 136–145)
WBC # BLD AUTO: 8.15 K/UL (ref 3.9–12.7)

## 2021-04-29 PROCEDURE — 99214 OFFICE O/P EST MOD 30 MIN: CPT | Mod: PBBFAC | Performed by: INTERNAL MEDICINE

## 2021-04-29 PROCEDURE — 99999 PR PBB SHADOW E&M-EST. PATIENT-LVL IV: ICD-10-PCS | Mod: PBBFAC,,, | Performed by: INTERNAL MEDICINE

## 2021-04-29 PROCEDURE — 36415 COLL VENOUS BLD VENIPUNCTURE: CPT | Performed by: INTERNAL MEDICINE

## 2021-04-29 PROCEDURE — 80053 COMPREHEN METABOLIC PANEL: CPT | Performed by: INTERNAL MEDICINE

## 2021-04-29 PROCEDURE — 99215 PR OFFICE/OUTPT VISIT, EST, LEVL V, 40-54 MIN: ICD-10-PCS | Mod: S$PBB,,, | Performed by: INTERNAL MEDICINE

## 2021-04-29 PROCEDURE — 99999 PR PBB SHADOW E&M-EST. PATIENT-LVL IV: CPT | Mod: PBBFAC,,, | Performed by: INTERNAL MEDICINE

## 2021-04-29 PROCEDURE — 85025 COMPLETE CBC W/AUTO DIFF WBC: CPT | Performed by: INTERNAL MEDICINE

## 2021-04-29 PROCEDURE — 99215 OFFICE O/P EST HI 40 MIN: CPT | Mod: S$PBB,,, | Performed by: INTERNAL MEDICINE

## 2021-04-29 RX ORDER — ALPRAZOLAM 0.5 MG/1
0.5 TABLET ORAL 2 TIMES DAILY PRN
Qty: 30 TABLET | Refills: 1 | Status: SHIPPED | OUTPATIENT
Start: 2021-04-29 | End: 2021-06-28 | Stop reason: SDUPTHER

## 2021-04-29 RX ORDER — ALPRAZOLAM 0.5 MG/1
0.5 TABLET ORAL 2 TIMES DAILY PRN
Qty: 30 TABLET | Refills: 0 | Status: SHIPPED | OUTPATIENT
Start: 2021-04-29 | End: 2021-07-12 | Stop reason: SDUPTHER

## 2021-05-05 ENCOUNTER — TELEPHONE (OUTPATIENT)
Dept: HEMATOLOGY/ONCOLOGY | Facility: CLINIC | Age: 71
End: 2021-05-05

## 2021-05-10 ENCOUNTER — SPECIALTY PHARMACY (OUTPATIENT)
Dept: PHARMACY | Facility: CLINIC | Age: 71
End: 2021-05-10

## 2021-05-13 ENCOUNTER — PATIENT MESSAGE (OUTPATIENT)
Dept: HEMATOLOGY/ONCOLOGY | Facility: CLINIC | Age: 71
End: 2021-05-13

## 2021-05-13 DIAGNOSIS — Z17.0 MALIGNANT NEOPLASM OF UPPER-OUTER QUADRANT OF RIGHT BREAST IN FEMALE, ESTROGEN RECEPTOR POSITIVE: Chronic | ICD-10-CM

## 2021-05-13 DIAGNOSIS — C50.411 MALIGNANT NEOPLASM OF UPPER-OUTER QUADRANT OF RIGHT BREAST IN FEMALE, ESTROGEN RECEPTOR POSITIVE: Chronic | ICD-10-CM

## 2021-05-13 RX ORDER — OXYCODONE AND ACETAMINOPHEN 7.5; 325 MG/1; MG/1
1 TABLET ORAL EVERY 4 HOURS PRN
Qty: 90 TABLET | Refills: 0 | Status: SHIPPED | OUTPATIENT
Start: 2021-05-13 | End: 2021-07-15 | Stop reason: SDUPTHER

## 2021-05-14 DIAGNOSIS — C50.411 MALIGNANT NEOPLASM OF UPPER-OUTER QUADRANT OF RIGHT BREAST IN FEMALE, ESTROGEN RECEPTOR POSITIVE: Chronic | ICD-10-CM

## 2021-05-14 DIAGNOSIS — Z17.0 MALIGNANT NEOPLASM OF UPPER-OUTER QUADRANT OF RIGHT BREAST IN FEMALE, ESTROGEN RECEPTOR POSITIVE: Chronic | ICD-10-CM

## 2021-05-14 DIAGNOSIS — I10 ESSENTIAL HYPERTENSION: ICD-10-CM

## 2021-05-14 RX ORDER — GABAPENTIN 600 MG/1
600 TABLET ORAL 3 TIMES DAILY
Qty: 90 TABLET | Refills: 3 | Status: SHIPPED | OUTPATIENT
Start: 2021-05-14 | End: 2021-11-11 | Stop reason: SDUPTHER

## 2021-05-14 RX ORDER — CLONIDINE HYDROCHLORIDE 0.2 MG/1
0.2 TABLET ORAL 3 TIMES DAILY
Qty: 270 TABLET | Refills: 2 | Status: SHIPPED | OUTPATIENT
Start: 2021-05-14 | End: 2021-07-12

## 2021-05-28 ENCOUNTER — EXTERNAL HOME HEALTH (OUTPATIENT)
Dept: HOME HEALTH SERVICES | Facility: HOSPITAL | Age: 71
End: 2021-05-28
Payer: MEDICARE

## 2021-06-02 ENCOUNTER — PATIENT OUTREACH (OUTPATIENT)
Dept: ADMINISTRATIVE | Facility: OTHER | Age: 71
End: 2021-06-02

## 2021-06-04 ENCOUNTER — OFFICE VISIT (OUTPATIENT)
Dept: PAIN MEDICINE | Facility: CLINIC | Age: 71
End: 2021-06-04
Payer: MEDICARE

## 2021-06-04 VITALS
SYSTOLIC BLOOD PRESSURE: 100 MMHG | HEART RATE: 49 BPM | RESPIRATION RATE: 18 BRPM | WEIGHT: 293 LBS | BODY MASS INDEX: 43.4 KG/M2 | HEIGHT: 69 IN | DIASTOLIC BLOOD PRESSURE: 68 MMHG

## 2021-06-04 DIAGNOSIS — C50.411 MALIGNANT NEOPLASM OF UPPER-OUTER QUADRANT OF RIGHT BREAST IN FEMALE, ESTROGEN RECEPTOR POSITIVE: ICD-10-CM

## 2021-06-04 DIAGNOSIS — Z17.0 MALIGNANT NEOPLASM OF UPPER-OUTER QUADRANT OF RIGHT BREAST IN FEMALE, ESTROGEN RECEPTOR POSITIVE: ICD-10-CM

## 2021-06-04 DIAGNOSIS — N64.4 MASTALGIA: ICD-10-CM

## 2021-06-04 PROCEDURE — 99214 PR OFFICE/OUTPT VISIT, EST, LEVL IV, 30-39 MIN: ICD-10-PCS | Mod: S$PBB,,, | Performed by: ANESTHESIOLOGY

## 2021-06-04 PROCEDURE — 99214 OFFICE O/P EST MOD 30 MIN: CPT | Mod: S$PBB,,, | Performed by: ANESTHESIOLOGY

## 2021-06-04 PROCEDURE — 99999 PR PBB SHADOW E&M-EST. PATIENT-LVL III: CPT | Mod: PBBFAC,,, | Performed by: ANESTHESIOLOGY

## 2021-06-04 PROCEDURE — 99213 OFFICE O/P EST LOW 20 MIN: CPT | Mod: PBBFAC | Performed by: ANESTHESIOLOGY

## 2021-06-04 PROCEDURE — 99999 PR PBB SHADOW E&M-EST. PATIENT-LVL III: ICD-10-PCS | Mod: PBBFAC,,, | Performed by: ANESTHESIOLOGY

## 2021-06-04 RX ORDER — TIZANIDINE 4 MG/1
4 TABLET ORAL 2 TIMES DAILY PRN
Qty: 60 TABLET | Refills: 0 | Status: SHIPPED | OUTPATIENT
Start: 2021-06-04 | End: 2021-07-15 | Stop reason: SDUPTHER

## 2021-06-07 ENCOUNTER — SPECIALTY PHARMACY (OUTPATIENT)
Dept: PHARMACY | Facility: CLINIC | Age: 71
End: 2021-06-07

## 2021-06-24 ENCOUNTER — LAB VISIT (OUTPATIENT)
Dept: LAB | Facility: HOSPITAL | Age: 71
End: 2021-06-24
Attending: INTERNAL MEDICINE
Payer: MEDICARE

## 2021-06-24 DIAGNOSIS — C50.411 MALIGNANT NEOPLASM OF UPPER-OUTER QUADRANT OF RIGHT BREAST IN FEMALE, ESTROGEN RECEPTOR POSITIVE: Chronic | ICD-10-CM

## 2021-06-24 DIAGNOSIS — I10 ESSENTIAL HYPERTENSION: ICD-10-CM

## 2021-06-24 DIAGNOSIS — Z17.0 MALIGNANT NEOPLASM OF UPPER-OUTER QUADRANT OF RIGHT BREAST IN FEMALE, ESTROGEN RECEPTOR POSITIVE: Chronic | ICD-10-CM

## 2021-06-24 LAB
ALBUMIN SERPL BCP-MCNC: 3.1 G/DL (ref 3.5–5.2)
ALP SERPL-CCNC: 142 U/L (ref 55–135)
ALT SERPL W/O P-5'-P-CCNC: 17 U/L (ref 10–44)
ANION GAP SERPL CALC-SCNC: 9 MMOL/L (ref 8–16)
AST SERPL-CCNC: 16 U/L (ref 10–40)
BASOPHILS # BLD AUTO: 0.09 K/UL (ref 0–0.2)
BASOPHILS NFR BLD: 1 % (ref 0–1.9)
BILIRUB SERPL-MCNC: 0.2 MG/DL (ref 0.1–1)
BUN SERPL-MCNC: 34 MG/DL (ref 8–23)
CALCIUM SERPL-MCNC: 9.5 MG/DL (ref 8.7–10.5)
CHLORIDE SERPL-SCNC: 109 MMOL/L (ref 95–110)
CO2 SERPL-SCNC: 25 MMOL/L (ref 23–29)
CREAT SERPL-MCNC: 1.4 MG/DL (ref 0.5–1.4)
DIFFERENTIAL METHOD: ABNORMAL
EOSINOPHIL # BLD AUTO: 0.2 K/UL (ref 0–0.5)
EOSINOPHIL NFR BLD: 2 % (ref 0–8)
ERYTHROCYTE [DISTWIDTH] IN BLOOD BY AUTOMATED COUNT: 16.5 % (ref 11.5–14.5)
EST. GFR  (AFRICAN AMERICAN): 44 ML/MIN/1.73 M^2
EST. GFR  (NON AFRICAN AMERICAN): 38 ML/MIN/1.73 M^2
GLUCOSE SERPL-MCNC: 116 MG/DL (ref 70–110)
HCT VFR BLD AUTO: 36.9 % (ref 37–48.5)
HGB BLD-MCNC: 11.1 G/DL (ref 12–16)
IMM GRANULOCYTES # BLD AUTO: 0.03 K/UL (ref 0–0.04)
IMM GRANULOCYTES NFR BLD AUTO: 0.3 % (ref 0–0.5)
LYMPHOCYTES # BLD AUTO: 3.5 K/UL (ref 1–4.8)
LYMPHOCYTES NFR BLD: 38 % (ref 18–48)
MCH RBC QN AUTO: 25.1 PG (ref 27–31)
MCHC RBC AUTO-ENTMCNC: 30.1 G/DL (ref 32–36)
MCV RBC AUTO: 83 FL (ref 82–98)
MONOCYTES # BLD AUTO: 0.8 K/UL (ref 0.3–1)
MONOCYTES NFR BLD: 8.9 % (ref 4–15)
NEUTROPHILS # BLD AUTO: 4.5 K/UL (ref 1.8–7.7)
NEUTROPHILS NFR BLD: 49.8 % (ref 38–73)
NRBC BLD-RTO: 0 /100 WBC
PLATELET # BLD AUTO: 350 K/UL (ref 150–450)
PMV BLD AUTO: 9.6 FL (ref 9.2–12.9)
POTASSIUM SERPL-SCNC: 4.1 MMOL/L (ref 3.5–5.1)
PROT SERPL-MCNC: 7.3 G/DL (ref 6–8.4)
RBC # BLD AUTO: 4.43 M/UL (ref 4–5.4)
SODIUM SERPL-SCNC: 143 MMOL/L (ref 136–145)
WBC # BLD AUTO: 9.11 K/UL (ref 3.9–12.7)

## 2021-06-24 PROCEDURE — 85025 COMPLETE CBC W/AUTO DIFF WBC: CPT | Performed by: INTERNAL MEDICINE

## 2021-06-24 PROCEDURE — 36415 COLL VENOUS BLD VENIPUNCTURE: CPT | Performed by: INTERNAL MEDICINE

## 2021-06-24 PROCEDURE — 80053 COMPREHEN METABOLIC PANEL: CPT | Performed by: INTERNAL MEDICINE

## 2021-06-28 ENCOUNTER — PATIENT MESSAGE (OUTPATIENT)
Dept: SURGERY | Facility: CLINIC | Age: 71
End: 2021-06-28

## 2021-06-28 ENCOUNTER — OFFICE VISIT (OUTPATIENT)
Dept: HEMATOLOGY/ONCOLOGY | Facility: CLINIC | Age: 71
End: 2021-06-28
Payer: MEDICARE

## 2021-06-28 ENCOUNTER — PATIENT MESSAGE (OUTPATIENT)
Dept: INTERNAL MEDICINE | Facility: CLINIC | Age: 71
End: 2021-06-28

## 2021-06-28 ENCOUNTER — PATIENT MESSAGE (OUTPATIENT)
Dept: OTHER | Facility: OTHER | Age: 71
End: 2021-06-28

## 2021-06-28 ENCOUNTER — SOCIAL WORK (OUTPATIENT)
Dept: HEMATOLOGY/ONCOLOGY | Facility: CLINIC | Age: 71
End: 2021-06-28

## 2021-06-28 VITALS
SYSTOLIC BLOOD PRESSURE: 132 MMHG | TEMPERATURE: 98 F | HEART RATE: 65 BPM | WEIGHT: 293 LBS | BODY MASS INDEX: 43.4 KG/M2 | OXYGEN SATURATION: 99 % | HEIGHT: 69 IN | DIASTOLIC BLOOD PRESSURE: 72 MMHG

## 2021-06-28 DIAGNOSIS — Z17.0 MALIGNANT NEOPLASM OF UPPER-OUTER QUADRANT OF RIGHT BREAST IN FEMALE, ESTROGEN RECEPTOR POSITIVE: Chronic | ICD-10-CM

## 2021-06-28 DIAGNOSIS — F41.9 ANXIETY: ICD-10-CM

## 2021-06-28 DIAGNOSIS — C50.411 MALIGNANT NEOPLASM OF UPPER-OUTER QUADRANT OF RIGHT BREAST IN FEMALE, ESTROGEN RECEPTOR POSITIVE: Chronic | ICD-10-CM

## 2021-06-28 PROCEDURE — 99213 OFFICE O/P EST LOW 20 MIN: CPT | Mod: PBBFAC | Performed by: INTERNAL MEDICINE

## 2021-06-28 PROCEDURE — 99215 PR OFFICE/OUTPT VISIT, EST, LEVL V, 40-54 MIN: ICD-10-PCS | Mod: S$PBB,,, | Performed by: INTERNAL MEDICINE

## 2021-06-28 PROCEDURE — 99999 PR PBB SHADOW E&M-EST. PATIENT-LVL III: CPT | Mod: PBBFAC,,, | Performed by: INTERNAL MEDICINE

## 2021-06-28 PROCEDURE — 99999 PR PBB SHADOW E&M-EST. PATIENT-LVL III: ICD-10-PCS | Mod: PBBFAC,,, | Performed by: INTERNAL MEDICINE

## 2021-06-28 PROCEDURE — 99215 OFFICE O/P EST HI 40 MIN: CPT | Mod: S$PBB,,, | Performed by: INTERNAL MEDICINE

## 2021-06-28 RX ORDER — ALPRAZOLAM 0.5 MG/1
0.5 TABLET ORAL 2 TIMES DAILY PRN
Qty: 30 TABLET | Refills: 1 | Status: SHIPPED | OUTPATIENT
Start: 2021-06-28 | End: 2021-07-12

## 2021-07-06 ENCOUNTER — SPECIALTY PHARMACY (OUTPATIENT)
Dept: PHARMACY | Facility: CLINIC | Age: 71
End: 2021-07-06

## 2021-07-08 ENCOUNTER — PATIENT MESSAGE (OUTPATIENT)
Dept: OTHER | Facility: OTHER | Age: 71
End: 2021-07-08

## 2021-07-12 ENCOUNTER — OFFICE VISIT (OUTPATIENT)
Dept: INTERNAL MEDICINE | Facility: CLINIC | Age: 71
End: 2021-07-12
Payer: MEDICARE

## 2021-07-12 VITALS
HEIGHT: 69 IN | SYSTOLIC BLOOD PRESSURE: 148 MMHG | HEART RATE: 92 BPM | OXYGEN SATURATION: 98 % | RESPIRATION RATE: 18 BRPM | BODY MASS INDEX: 43.4 KG/M2 | DIASTOLIC BLOOD PRESSURE: 108 MMHG | TEMPERATURE: 96 F | WEIGHT: 293 LBS

## 2021-07-12 DIAGNOSIS — I10 ESSENTIAL HYPERTENSION: Primary | ICD-10-CM

## 2021-07-12 DIAGNOSIS — Z17.0 MALIGNANT NEOPLASM OF UPPER-OUTER QUADRANT OF RIGHT BREAST IN FEMALE, ESTROGEN RECEPTOR POSITIVE: Chronic | ICD-10-CM

## 2021-07-12 DIAGNOSIS — F41.9 ANXIETY: ICD-10-CM

## 2021-07-12 DIAGNOSIS — C50.411 MALIGNANT NEOPLASM OF UPPER-OUTER QUADRANT OF RIGHT BREAST IN FEMALE, ESTROGEN RECEPTOR POSITIVE: Chronic | ICD-10-CM

## 2021-07-12 PROCEDURE — 99999 PR PBB SHADOW E&M-EST. PATIENT-LVL V: ICD-10-PCS | Mod: PBBFAC,,, | Performed by: FAMILY MEDICINE

## 2021-07-12 PROCEDURE — 99214 PR OFFICE/OUTPT VISIT, EST, LEVL IV, 30-39 MIN: ICD-10-PCS | Mod: S$PBB,,, | Performed by: FAMILY MEDICINE

## 2021-07-12 PROCEDURE — 99215 OFFICE O/P EST HI 40 MIN: CPT | Mod: PBBFAC | Performed by: FAMILY MEDICINE

## 2021-07-12 PROCEDURE — 99214 OFFICE O/P EST MOD 30 MIN: CPT | Mod: S$PBB,,, | Performed by: FAMILY MEDICINE

## 2021-07-12 PROCEDURE — 99999 PR PBB SHADOW E&M-EST. PATIENT-LVL V: CPT | Mod: PBBFAC,,, | Performed by: FAMILY MEDICINE

## 2021-07-12 RX ORDER — ALPRAZOLAM 0.5 MG/1
0.5 TABLET ORAL 2 TIMES DAILY PRN
Qty: 60 TABLET | Refills: 0 | Status: SHIPPED | OUTPATIENT
Start: 2021-07-12 | End: 2021-08-16 | Stop reason: SDUPTHER

## 2021-07-12 RX ORDER — NEOMYCIN SULFATE, POLYMYXIN B SULFATE AND DEXAMETHASONE 1; 3.5; 1 MG/ML; MG/ML; [USP'U]/ML
1 SUSPENSION OPHTHALMIC 4 TIMES DAILY
COMMUNITY
Start: 2021-07-07 | End: 2022-02-01 | Stop reason: ALTCHOICE

## 2021-07-12 RX ORDER — CLONIDINE HYDROCHLORIDE 0.1 MG/1
0.1 TABLET ORAL 2 TIMES DAILY
Qty: 180 TABLET | Refills: 1 | Status: SHIPPED | OUTPATIENT
Start: 2021-07-12 | End: 2022-02-15 | Stop reason: SDUPTHER

## 2021-07-15 DIAGNOSIS — R51.9 NONINTRACTABLE HEADACHE, UNSPECIFIED CHRONICITY PATTERN, UNSPECIFIED HEADACHE TYPE: ICD-10-CM

## 2021-07-15 DIAGNOSIS — Z17.0 MALIGNANT NEOPLASM OF UPPER-OUTER QUADRANT OF RIGHT BREAST IN FEMALE, ESTROGEN RECEPTOR POSITIVE: Chronic | ICD-10-CM

## 2021-07-15 DIAGNOSIS — C50.411 MALIGNANT NEOPLASM OF UPPER-OUTER QUADRANT OF RIGHT BREAST IN FEMALE, ESTROGEN RECEPTOR POSITIVE: Chronic | ICD-10-CM

## 2021-07-15 RX ORDER — TIZANIDINE 4 MG/1
4 TABLET ORAL 2 TIMES DAILY PRN
Qty: 60 TABLET | Refills: 0 | Status: SHIPPED | OUTPATIENT
Start: 2021-07-15 | End: 2021-08-16 | Stop reason: SDUPTHER

## 2021-07-15 RX ORDER — BUTALBITAL, ACETAMINOPHEN AND CAFFEINE 50; 325; 40 MG/1; MG/1; MG/1
1 CAPSULE ORAL 2 TIMES DAILY PRN
Qty: 60 CAPSULE | Refills: 1 | Status: SHIPPED | OUTPATIENT
Start: 2021-07-15 | End: 2021-09-07 | Stop reason: SDUPTHER

## 2021-07-15 RX ORDER — OXYCODONE AND ACETAMINOPHEN 7.5; 325 MG/1; MG/1
1 TABLET ORAL EVERY 4 HOURS PRN
Qty: 90 TABLET | Refills: 0 | Status: SHIPPED | OUTPATIENT
Start: 2021-07-15 | End: 2021-08-16 | Stop reason: SDUPTHER

## 2021-07-20 ENCOUNTER — PATIENT MESSAGE (OUTPATIENT)
Dept: SURGERY | Facility: CLINIC | Age: 71
End: 2021-07-20

## 2021-07-22 ENCOUNTER — PATIENT MESSAGE (OUTPATIENT)
Dept: ADMINISTRATIVE | Facility: OTHER | Age: 71
End: 2021-07-22

## 2021-07-27 ENCOUNTER — CLINICAL SUPPORT (OUTPATIENT)
Dept: INTERNAL MEDICINE | Facility: CLINIC | Age: 71
End: 2021-07-27
Payer: MEDICARE

## 2021-07-27 ENCOUNTER — TELEPHONE (OUTPATIENT)
Dept: INTERNAL MEDICINE | Facility: CLINIC | Age: 71
End: 2021-07-27

## 2021-07-27 ENCOUNTER — APPOINTMENT (OUTPATIENT)
Dept: RADIOLOGY | Facility: HOSPITAL | Age: 71
End: 2021-07-27
Attending: FAMILY MEDICINE
Payer: MEDICARE

## 2021-07-27 DIAGNOSIS — Z78.0 POSTMENOPAUSAL: ICD-10-CM

## 2021-07-27 PROCEDURE — 77080 DXA BONE DENSITY AXIAL: CPT | Mod: 26,,, | Performed by: RADIOLOGY

## 2021-07-27 PROCEDURE — 77080 DEXA BONE DENSITY SPINE HIP: ICD-10-PCS | Mod: 26,,, | Performed by: RADIOLOGY

## 2021-07-27 PROCEDURE — 77080 DXA BONE DENSITY AXIAL: CPT | Mod: TC

## 2021-07-28 ENCOUNTER — PATIENT OUTREACH (OUTPATIENT)
Dept: ADMINISTRATIVE | Facility: OTHER | Age: 71
End: 2021-07-28

## 2021-07-30 ENCOUNTER — OFFICE VISIT (OUTPATIENT)
Dept: SURGERY | Facility: CLINIC | Age: 71
End: 2021-07-30
Payer: MEDICARE

## 2021-07-30 VITALS
HEIGHT: 69 IN | BODY MASS INDEX: 43.4 KG/M2 | SYSTOLIC BLOOD PRESSURE: 129 MMHG | WEIGHT: 293 LBS | OXYGEN SATURATION: 98 % | DIASTOLIC BLOOD PRESSURE: 73 MMHG | HEART RATE: 88 BPM

## 2021-07-30 DIAGNOSIS — R59.0 AXILLARY ADENOPATHY: Primary | ICD-10-CM

## 2021-07-30 PROCEDURE — 99211 OFF/OP EST MAY X REQ PHY/QHP: CPT | Mod: S$PBB,,, | Performed by: SURGERY

## 2021-07-30 PROCEDURE — 99999 PR PBB SHADOW E&M-EST. PATIENT-LVL III: ICD-10-PCS | Mod: PBBFAC,,, | Performed by: SURGERY

## 2021-07-30 PROCEDURE — 99213 OFFICE O/P EST LOW 20 MIN: CPT | Mod: PBBFAC | Performed by: SURGERY

## 2021-07-30 PROCEDURE — 99211 PR OFFICE/OUTPT VISIT, EST, LEVL I: ICD-10-PCS | Mod: S$PBB,,, | Performed by: SURGERY

## 2021-07-30 PROCEDURE — 99999 PR PBB SHADOW E&M-EST. PATIENT-LVL III: CPT | Mod: PBBFAC,,, | Performed by: SURGERY

## 2021-07-31 PROCEDURE — 99457 PR MONITORING, PHYSIOL PARAM, REMOTE, 1ST 20 MINS, PER MONTH: ICD-10-PCS | Mod: S$PBB,,, | Performed by: INTERNAL MEDICINE

## 2021-07-31 PROCEDURE — 99457 RPM TX MGMT 1ST 20 MIN: CPT | Mod: S$PBB,,, | Performed by: INTERNAL MEDICINE

## 2021-08-03 ENCOUNTER — PATIENT MESSAGE (OUTPATIENT)
Dept: PHARMACY | Facility: CLINIC | Age: 71
End: 2021-08-03

## 2021-08-03 ENCOUNTER — SPECIALTY PHARMACY (OUTPATIENT)
Dept: PHARMACY | Facility: CLINIC | Age: 71
End: 2021-08-03

## 2021-08-06 ENCOUNTER — PATIENT MESSAGE (OUTPATIENT)
Dept: INTERNAL MEDICINE | Facility: CLINIC | Age: 71
End: 2021-08-06

## 2021-08-16 DIAGNOSIS — F41.9 ANXIETY: ICD-10-CM

## 2021-08-16 DIAGNOSIS — Z17.0 MALIGNANT NEOPLASM OF UPPER-OUTER QUADRANT OF RIGHT BREAST IN FEMALE, ESTROGEN RECEPTOR POSITIVE: Chronic | ICD-10-CM

## 2021-08-16 DIAGNOSIS — C50.411 MALIGNANT NEOPLASM OF UPPER-OUTER QUADRANT OF RIGHT BREAST IN FEMALE, ESTROGEN RECEPTOR POSITIVE: Chronic | ICD-10-CM

## 2021-08-16 RX ORDER — ALPRAZOLAM 0.5 MG/1
0.5 TABLET ORAL 2 TIMES DAILY PRN
Qty: 60 TABLET | Refills: 0 | Status: SHIPPED | OUTPATIENT
Start: 2021-08-16 | End: 2021-09-07 | Stop reason: SDUPTHER

## 2021-08-16 RX ORDER — OXYCODONE AND ACETAMINOPHEN 7.5; 325 MG/1; MG/1
1 TABLET ORAL EVERY 4 HOURS PRN
Qty: 90 TABLET | Refills: 0 | Status: SHIPPED | OUTPATIENT
Start: 2021-08-16 | End: 2021-09-28 | Stop reason: SDUPTHER

## 2021-08-17 RX ORDER — TIZANIDINE 4 MG/1
4 TABLET ORAL 2 TIMES DAILY PRN
Qty: 60 TABLET | Refills: 0 | Status: SHIPPED | OUTPATIENT
Start: 2021-08-17 | End: 2021-09-07 | Stop reason: SDUPTHER

## 2021-08-26 ENCOUNTER — NURSE TRIAGE (OUTPATIENT)
Dept: ADMINISTRATIVE | Facility: CLINIC | Age: 71
End: 2021-08-26

## 2021-09-03 DIAGNOSIS — I10 ESSENTIAL HYPERTENSION: ICD-10-CM

## 2021-09-03 RX ORDER — CARVEDILOL 12.5 MG/1
12.5 TABLET ORAL 2 TIMES DAILY
Qty: 60 TABLET | Refills: 5 | Status: SHIPPED | OUTPATIENT
Start: 2021-09-03 | End: 2021-09-23

## 2021-09-07 ENCOUNTER — PATIENT MESSAGE (OUTPATIENT)
Dept: INTERNAL MEDICINE | Facility: CLINIC | Age: 71
End: 2021-09-07

## 2021-09-07 DIAGNOSIS — Z17.0 MALIGNANT NEOPLASM OF UPPER-OUTER QUADRANT OF RIGHT BREAST IN FEMALE, ESTROGEN RECEPTOR POSITIVE: Chronic | ICD-10-CM

## 2021-09-07 DIAGNOSIS — C50.411 MALIGNANT NEOPLASM OF UPPER-OUTER QUADRANT OF RIGHT BREAST IN FEMALE, ESTROGEN RECEPTOR POSITIVE: Chronic | ICD-10-CM

## 2021-09-07 DIAGNOSIS — F41.9 ANXIETY: ICD-10-CM

## 2021-09-07 DIAGNOSIS — R51.9 NONINTRACTABLE HEADACHE, UNSPECIFIED CHRONICITY PATTERN, UNSPECIFIED HEADACHE TYPE: ICD-10-CM

## 2021-09-07 RX ORDER — OXYCODONE AND ACETAMINOPHEN 7.5; 325 MG/1; MG/1
1 TABLET ORAL EVERY 4 HOURS PRN
Qty: 90 TABLET | Refills: 0 | OUTPATIENT
Start: 2021-09-07 | End: 2022-09-07

## 2021-09-07 RX ORDER — LETROZOLE 2.5 MG/1
2.5 TABLET, FILM COATED ORAL DAILY
Qty: 30 TABLET | Refills: 11 | Status: SHIPPED | OUTPATIENT
Start: 2021-09-07 | End: 2021-12-29 | Stop reason: SDUPTHER

## 2021-09-07 RX ORDER — TIZANIDINE 4 MG/1
4 TABLET ORAL 2 TIMES DAILY PRN
Qty: 60 TABLET | Refills: 0 | Status: SHIPPED | OUTPATIENT
Start: 2021-09-07 | End: 2021-10-16

## 2021-09-07 RX ORDER — ALPRAZOLAM 0.5 MG/1
0.5 TABLET ORAL 2 TIMES DAILY PRN
Qty: 60 TABLET | Refills: 0 | Status: SHIPPED | OUTPATIENT
Start: 2021-09-07 | End: 2021-10-24 | Stop reason: SDUPTHER

## 2021-09-07 RX ORDER — BUTALBITAL, ACETAMINOPHEN AND CAFFEINE 50; 325; 40 MG/1; MG/1; MG/1
1 CAPSULE ORAL 2 TIMES DAILY PRN
Qty: 60 CAPSULE | Refills: 1 | Status: SHIPPED | OUTPATIENT
Start: 2021-09-07 | End: 2021-10-15 | Stop reason: SDUPTHER

## 2021-09-14 ENCOUNTER — SPECIALTY PHARMACY (OUTPATIENT)
Dept: PHARMACY | Facility: CLINIC | Age: 71
End: 2021-09-14

## 2021-09-15 ENCOUNTER — TELEPHONE (OUTPATIENT)
Dept: INTERNAL MEDICINE | Facility: CLINIC | Age: 71
End: 2021-09-15

## 2021-09-15 ENCOUNTER — PATIENT MESSAGE (OUTPATIENT)
Dept: INTERNAL MEDICINE | Facility: CLINIC | Age: 71
End: 2021-09-15

## 2021-09-21 ENCOUNTER — PATIENT MESSAGE (OUTPATIENT)
Dept: INTERNAL MEDICINE | Facility: CLINIC | Age: 71
End: 2021-09-21

## 2021-09-23 ENCOUNTER — OFFICE VISIT (OUTPATIENT)
Dept: INTERNAL MEDICINE | Facility: CLINIC | Age: 71
End: 2021-09-23
Payer: MEDICARE

## 2021-09-23 VITALS
WEIGHT: 293 LBS | TEMPERATURE: 96 F | HEART RATE: 92 BPM | HEIGHT: 69 IN | BODY MASS INDEX: 43.4 KG/M2 | RESPIRATION RATE: 18 BRPM | DIASTOLIC BLOOD PRESSURE: 92 MMHG | SYSTOLIC BLOOD PRESSURE: 154 MMHG | OXYGEN SATURATION: 97 %

## 2021-09-23 DIAGNOSIS — N63.20 BREAST MASS, LEFT: Primary | ICD-10-CM

## 2021-09-23 DIAGNOSIS — R42 DIZZINESS AND GIDDINESS: ICD-10-CM

## 2021-09-23 DIAGNOSIS — G44.311 INTRACTABLE ACUTE POST-TRAUMATIC HEADACHE: ICD-10-CM

## 2021-09-23 DIAGNOSIS — R55 SYNCOPE AND COLLAPSE: ICD-10-CM

## 2021-09-23 DIAGNOSIS — F41.9 ANXIETY: ICD-10-CM

## 2021-09-23 DIAGNOSIS — Z85.3 HISTORY OF BREAST CANCER: ICD-10-CM

## 2021-09-23 PROCEDURE — 99999 PR PBB SHADOW E&M-EST. PATIENT-LVL IV: ICD-10-PCS | Mod: PBBFAC,,, | Performed by: FAMILY MEDICINE

## 2021-09-23 PROCEDURE — 99214 OFFICE O/P EST MOD 30 MIN: CPT | Mod: PBBFAC | Performed by: FAMILY MEDICINE

## 2021-09-23 PROCEDURE — 99214 PR OFFICE/OUTPT VISIT, EST, LEVL IV, 30-39 MIN: ICD-10-PCS | Mod: S$PBB,,, | Performed by: FAMILY MEDICINE

## 2021-09-23 PROCEDURE — 99214 OFFICE O/P EST MOD 30 MIN: CPT | Mod: S$PBB,,, | Performed by: FAMILY MEDICINE

## 2021-09-23 PROCEDURE — 96372 THER/PROPH/DIAG INJ SC/IM: CPT | Mod: PBBFAC

## 2021-09-23 PROCEDURE — 99999 PR PBB SHADOW E&M-EST. PATIENT-LVL IV: CPT | Mod: PBBFAC,,, | Performed by: FAMILY MEDICINE

## 2021-09-23 RX ORDER — DULOXETIN HYDROCHLORIDE 20 MG/1
20 CAPSULE, DELAYED RELEASE ORAL DAILY
Qty: 30 CAPSULE | Refills: 1 | Status: SHIPPED | OUTPATIENT
Start: 2021-09-23 | End: 2021-10-15 | Stop reason: SDUPTHER

## 2021-09-23 RX ORDER — KETOROLAC TROMETHAMINE 30 MG/ML
60 INJECTION, SOLUTION INTRAMUSCULAR; INTRAVENOUS ONCE
Status: COMPLETED | OUTPATIENT
Start: 2021-09-23 | End: 2021-09-23

## 2021-09-23 RX ADMIN — KETOROLAC TROMETHAMINE 60 MG: 60 INJECTION, SOLUTION INTRAMUSCULAR at 04:09

## 2021-09-24 ENCOUNTER — TELEPHONE (OUTPATIENT)
Dept: INTERNAL MEDICINE | Facility: CLINIC | Age: 71
End: 2021-09-24

## 2021-09-24 ENCOUNTER — HOSPITAL ENCOUNTER (OUTPATIENT)
Dept: RADIOLOGY | Facility: HOSPITAL | Age: 71
Discharge: HOME | End: 2021-09-24
Attending: FAMILY MEDICINE
Payer: MEDICARE

## 2021-09-24 DIAGNOSIS — Z85.3 HISTORY OF BREAST CANCER: ICD-10-CM

## 2021-09-24 DIAGNOSIS — G44.311 INTRACTABLE ACUTE POST-TRAUMATIC HEADACHE: ICD-10-CM

## 2021-09-24 DIAGNOSIS — N63.20 BREAST MASS, LEFT: ICD-10-CM

## 2021-09-24 DIAGNOSIS — R42 DIZZINESS AND GIDDINESS: ICD-10-CM

## 2021-09-24 DIAGNOSIS — R55 SYNCOPE AND COLLAPSE: ICD-10-CM

## 2021-09-24 PROCEDURE — 70450 CT HEAD/BRAIN W/O DYE: CPT | Mod: TC

## 2021-09-24 PROCEDURE — 70450 CT HEAD/BRAIN W/O DYE: CPT | Mod: 26,,, | Performed by: RADIOLOGY

## 2021-09-24 PROCEDURE — 77062 MAMMO DIGITAL DIAGNOSTIC BILAT WITH TOMO: ICD-10-PCS | Mod: 26,,, | Performed by: RADIOLOGY

## 2021-09-24 PROCEDURE — 76642 US BREAST LEFT LIMITED: ICD-10-PCS | Mod: 26,LT,, | Performed by: RADIOLOGY

## 2021-09-24 PROCEDURE — 76642 ULTRASOUND BREAST LIMITED: CPT | Mod: TC,LT

## 2021-09-24 PROCEDURE — 77062 BREAST TOMOSYNTHESIS BI: CPT | Mod: 26,,, | Performed by: RADIOLOGY

## 2021-09-24 PROCEDURE — 70450 CT HEAD WITHOUT CONTRAST: ICD-10-PCS | Mod: 26,,, | Performed by: RADIOLOGY

## 2021-09-24 PROCEDURE — 77066 DX MAMMO INCL CAD BI: CPT | Mod: TC

## 2021-09-24 PROCEDURE — 77066 MAMMO DIGITAL DIAGNOSTIC BILAT WITH TOMO: ICD-10-PCS | Mod: 26,,, | Performed by: RADIOLOGY

## 2021-09-24 PROCEDURE — 76642 ULTRASOUND BREAST LIMITED: CPT | Mod: 26,LT,, | Performed by: RADIOLOGY

## 2021-09-24 PROCEDURE — 77066 DX MAMMO INCL CAD BI: CPT | Mod: 26,,, | Performed by: RADIOLOGY

## 2021-09-28 ENCOUNTER — LAB VISIT (OUTPATIENT)
Dept: LAB | Facility: HOSPITAL | Age: 71
End: 2021-09-28
Attending: INTERNAL MEDICINE
Payer: MEDICARE

## 2021-09-28 ENCOUNTER — TELEPHONE (OUTPATIENT)
Dept: HEMATOLOGY/ONCOLOGY | Facility: CLINIC | Age: 71
End: 2021-09-28

## 2021-09-28 ENCOUNTER — OFFICE VISIT (OUTPATIENT)
Dept: HEMATOLOGY/ONCOLOGY | Facility: CLINIC | Age: 71
End: 2021-09-28
Payer: MEDICARE

## 2021-09-28 VITALS
DIASTOLIC BLOOD PRESSURE: 89 MMHG | SYSTOLIC BLOOD PRESSURE: 146 MMHG | OXYGEN SATURATION: 96 % | BODY MASS INDEX: 43.4 KG/M2 | HEART RATE: 74 BPM | TEMPERATURE: 97 F | WEIGHT: 293 LBS | HEIGHT: 69 IN

## 2021-09-28 DIAGNOSIS — C50.411 MALIGNANT NEOPLASM OF UPPER-OUTER QUADRANT OF RIGHT BREAST IN FEMALE, ESTROGEN RECEPTOR POSITIVE: Chronic | ICD-10-CM

## 2021-09-28 DIAGNOSIS — Z17.0 MALIGNANT NEOPLASM OF UPPER-OUTER QUADRANT OF RIGHT BREAST IN FEMALE, ESTROGEN RECEPTOR POSITIVE: Chronic | ICD-10-CM

## 2021-09-28 LAB
ALBUMIN SERPL BCP-MCNC: 3.3 G/DL (ref 3.5–5.2)
ALP SERPL-CCNC: 157 U/L (ref 55–135)
ALT SERPL W/O P-5'-P-CCNC: 26 U/L (ref 10–44)
ANION GAP SERPL CALC-SCNC: 14 MMOL/L (ref 8–16)
AST SERPL-CCNC: 21 U/L (ref 10–40)
BASOPHILS # BLD AUTO: 0.09 K/UL (ref 0–0.2)
BASOPHILS NFR BLD: 1 % (ref 0–1.9)
BILIRUB SERPL-MCNC: 0.3 MG/DL (ref 0.1–1)
BUN SERPL-MCNC: 17 MG/DL (ref 8–23)
CALCIUM SERPL-MCNC: 10.2 MG/DL (ref 8.7–10.5)
CHLORIDE SERPL-SCNC: 108 MMOL/L (ref 95–110)
CO2 SERPL-SCNC: 22 MMOL/L (ref 23–29)
CREAT SERPL-MCNC: 1.3 MG/DL (ref 0.5–1.4)
DIFFERENTIAL METHOD: ABNORMAL
EOSINOPHIL # BLD AUTO: 0.3 K/UL (ref 0–0.5)
EOSINOPHIL NFR BLD: 3.1 % (ref 0–8)
ERYTHROCYTE [DISTWIDTH] IN BLOOD BY AUTOMATED COUNT: 16.1 % (ref 11.5–14.5)
EST. GFR  (AFRICAN AMERICAN): 48 ML/MIN/1.73 M^2
EST. GFR  (NON AFRICAN AMERICAN): 42 ML/MIN/1.73 M^2
GLUCOSE SERPL-MCNC: 103 MG/DL (ref 70–110)
HCT VFR BLD AUTO: 38.6 % (ref 37–48.5)
HGB BLD-MCNC: 11.5 G/DL (ref 12–16)
IMM GRANULOCYTES # BLD AUTO: 0.04 K/UL (ref 0–0.04)
IMM GRANULOCYTES NFR BLD AUTO: 0.4 % (ref 0–0.5)
LYMPHOCYTES # BLD AUTO: 2.8 K/UL (ref 1–4.8)
LYMPHOCYTES NFR BLD: 30.2 % (ref 18–48)
MCH RBC QN AUTO: 24.4 PG (ref 27–31)
MCHC RBC AUTO-ENTMCNC: 29.8 G/DL (ref 32–36)
MCV RBC AUTO: 82 FL (ref 82–98)
MONOCYTES # BLD AUTO: 0.8 K/UL (ref 0.3–1)
MONOCYTES NFR BLD: 8.5 % (ref 4–15)
NEUTROPHILS # BLD AUTO: 5.3 K/UL (ref 1.8–7.7)
NEUTROPHILS NFR BLD: 56.8 % (ref 38–73)
NRBC BLD-RTO: 0 /100 WBC
PLATELET # BLD AUTO: 275 K/UL (ref 150–450)
PMV BLD AUTO: 9.4 FL (ref 9.2–12.9)
POTASSIUM SERPL-SCNC: 4.5 MMOL/L (ref 3.5–5.1)
PROT SERPL-MCNC: 7.7 G/DL (ref 6–8.4)
RBC # BLD AUTO: 4.72 M/UL (ref 4–5.4)
SODIUM SERPL-SCNC: 144 MMOL/L (ref 136–145)
WBC # BLD AUTO: 9.38 K/UL (ref 3.9–12.7)

## 2021-09-28 PROCEDURE — 99215 PR OFFICE/OUTPT VISIT, EST, LEVL V, 40-54 MIN: ICD-10-PCS | Mod: S$PBB,,, | Performed by: INTERNAL MEDICINE

## 2021-09-28 PROCEDURE — 85025 COMPLETE CBC W/AUTO DIFF WBC: CPT | Performed by: INTERNAL MEDICINE

## 2021-09-28 PROCEDURE — 99999 PR PBB SHADOW E&M-EST. PATIENT-LVL V: CPT | Mod: PBBFAC,,, | Performed by: INTERNAL MEDICINE

## 2021-09-28 PROCEDURE — 36415 COLL VENOUS BLD VENIPUNCTURE: CPT | Performed by: INTERNAL MEDICINE

## 2021-09-28 PROCEDURE — 99999 PR PBB SHADOW E&M-EST. PATIENT-LVL V: ICD-10-PCS | Mod: PBBFAC,,, | Performed by: INTERNAL MEDICINE

## 2021-09-28 PROCEDURE — 99215 OFFICE O/P EST HI 40 MIN: CPT | Mod: PBBFAC | Performed by: INTERNAL MEDICINE

## 2021-09-28 PROCEDURE — 99215 OFFICE O/P EST HI 40 MIN: CPT | Mod: S$PBB,,, | Performed by: INTERNAL MEDICINE

## 2021-09-28 PROCEDURE — 80053 COMPREHEN METABOLIC PANEL: CPT | Performed by: INTERNAL MEDICINE

## 2021-09-28 RX ORDER — OXYCODONE AND ACETAMINOPHEN 7.5; 325 MG/1; MG/1
1 TABLET ORAL EVERY 4 HOURS PRN
Qty: 90 TABLET | Refills: 0 | Status: SHIPPED | OUTPATIENT
Start: 2021-09-28 | End: 2021-10-18 | Stop reason: SDUPTHER

## 2021-09-29 ENCOUNTER — PATIENT MESSAGE (OUTPATIENT)
Dept: HEMATOLOGY/ONCOLOGY | Facility: CLINIC | Age: 71
End: 2021-09-29

## 2021-10-04 ENCOUNTER — TELEPHONE (OUTPATIENT)
Dept: RADIOLOGY | Facility: HOSPITAL | Age: 71
End: 2021-10-04

## 2021-10-05 ENCOUNTER — HOSPITAL ENCOUNTER (OUTPATIENT)
Dept: RADIOLOGY | Facility: HOSPITAL | Age: 71
Discharge: HOME | End: 2021-10-05
Attending: INTERNAL MEDICINE
Payer: MEDICARE

## 2021-10-05 DIAGNOSIS — C50.411 MALIGNANT NEOPLASM OF UPPER-OUTER QUADRANT OF RIGHT BREAST IN FEMALE, ESTROGEN RECEPTOR POSITIVE: ICD-10-CM

## 2021-10-05 DIAGNOSIS — Z17.0 MALIGNANT NEOPLASM OF UPPER-OUTER QUADRANT OF RIGHT BREAST IN FEMALE, ESTROGEN RECEPTOR POSITIVE: ICD-10-CM

## 2021-10-05 PROCEDURE — 78815 PET IMAGE W/CT SKULL-THIGH: CPT | Mod: TC,MG

## 2021-10-05 PROCEDURE — G1004 CDSM NDSC: HCPCS | Mod: ,,, | Performed by: RADIOLOGY

## 2021-10-05 PROCEDURE — 78815 NM PET CT ROUTINE: ICD-10-PCS | Mod: 26,PS,MG, | Performed by: RADIOLOGY

## 2021-10-05 PROCEDURE — G1004 NM PET CT ROUTINE: ICD-10-PCS | Mod: ,,, | Performed by: RADIOLOGY

## 2021-10-05 PROCEDURE — 78815 PET IMAGE W/CT SKULL-THIGH: CPT | Mod: 26,PS,MG, | Performed by: RADIOLOGY

## 2021-10-07 ENCOUNTER — PATIENT MESSAGE (OUTPATIENT)
Dept: RADIATION ONCOLOGY | Facility: CLINIC | Age: 71
End: 2021-10-07

## 2021-10-08 ENCOUNTER — PATIENT MESSAGE (OUTPATIENT)
Dept: INTERNAL MEDICINE | Facility: CLINIC | Age: 71
End: 2021-10-08

## 2021-10-11 ENCOUNTER — SPECIALTY PHARMACY (OUTPATIENT)
Dept: PHARMACY | Facility: CLINIC | Age: 71
End: 2021-10-11

## 2021-10-14 ENCOUNTER — TELEPHONE (OUTPATIENT)
Dept: INTERNAL MEDICINE | Facility: CLINIC | Age: 71
End: 2021-10-14

## 2021-10-15 ENCOUNTER — TELEPHONE (OUTPATIENT)
Dept: INTERNAL MEDICINE | Facility: CLINIC | Age: 71
End: 2021-10-15

## 2021-10-15 ENCOUNTER — OFFICE VISIT (OUTPATIENT)
Dept: INTERNAL MEDICINE | Facility: CLINIC | Age: 71
End: 2021-10-15
Payer: MEDICARE

## 2021-10-15 VITALS — SYSTOLIC BLOOD PRESSURE: 159 MMHG | DIASTOLIC BLOOD PRESSURE: 86 MMHG

## 2021-10-15 DIAGNOSIS — R42 DIZZINESS: Primary | ICD-10-CM

## 2021-10-15 DIAGNOSIS — R51.9 NONINTRACTABLE HEADACHE, UNSPECIFIED CHRONICITY PATTERN, UNSPECIFIED HEADACHE TYPE: ICD-10-CM

## 2021-10-15 DIAGNOSIS — F41.9 ANXIETY: ICD-10-CM

## 2021-10-15 PROCEDURE — 99441 PR PHYSICIAN TELEPHONE EVALUATION 5-10 MIN: ICD-10-PCS | Mod: 95,,, | Performed by: NURSE PRACTITIONER

## 2021-10-15 PROCEDURE — 99441 PR PHYSICIAN TELEPHONE EVALUATION 5-10 MIN: CPT | Mod: 95,,, | Performed by: NURSE PRACTITIONER

## 2021-10-15 RX ORDER — BUTALBITAL, ACETAMINOPHEN AND CAFFEINE 50; 325; 40 MG/1; MG/1; MG/1
1 CAPSULE ORAL 2 TIMES DAILY PRN
Qty: 60 CAPSULE | Refills: 1 | Status: SHIPPED | OUTPATIENT
Start: 2021-10-15 | End: 2021-11-15 | Stop reason: SDUPTHER

## 2021-10-15 RX ORDER — DULOXETIN HYDROCHLORIDE 20 MG/1
20 CAPSULE, DELAYED RELEASE ORAL DAILY
Qty: 30 CAPSULE | Refills: 1 | Status: SHIPPED | OUTPATIENT
Start: 2021-10-15 | End: 2021-10-18

## 2021-10-18 ENCOUNTER — LAB VISIT (OUTPATIENT)
Dept: LAB | Facility: HOSPITAL | Age: 71
End: 2021-10-18
Attending: FAMILY MEDICINE
Payer: MEDICARE

## 2021-10-18 ENCOUNTER — OFFICE VISIT (OUTPATIENT)
Dept: INTERNAL MEDICINE | Facility: CLINIC | Age: 71
End: 2021-10-18
Payer: MEDICARE

## 2021-10-18 ENCOUNTER — OFFICE VISIT (OUTPATIENT)
Dept: RADIATION ONCOLOGY | Facility: CLINIC | Age: 71
End: 2021-10-18
Payer: MEDICARE

## 2021-10-18 VITALS
SYSTOLIC BLOOD PRESSURE: 172 MMHG | HEIGHT: 69 IN | OXYGEN SATURATION: 98 % | RESPIRATION RATE: 18 BRPM | WEIGHT: 293 LBS | DIASTOLIC BLOOD PRESSURE: 91 MMHG | BODY MASS INDEX: 43.4 KG/M2 | HEART RATE: 58 BPM | TEMPERATURE: 98 F

## 2021-10-18 VITALS
HEIGHT: 69 IN | WEIGHT: 293 LBS | RESPIRATION RATE: 18 BRPM | OXYGEN SATURATION: 98 % | BODY MASS INDEX: 43.4 KG/M2 | DIASTOLIC BLOOD PRESSURE: 80 MMHG | HEART RATE: 58 BPM | TEMPERATURE: 98 F | SYSTOLIC BLOOD PRESSURE: 124 MMHG

## 2021-10-18 DIAGNOSIS — G89.29 CHRONIC INTRACTABLE HEADACHE, UNSPECIFIED HEADACHE TYPE: ICD-10-CM

## 2021-10-18 DIAGNOSIS — N64.4 MASTALGIA: ICD-10-CM

## 2021-10-18 DIAGNOSIS — R61 SWEATING INCREASE: ICD-10-CM

## 2021-10-18 DIAGNOSIS — Z17.0 MALIGNANT NEOPLASM OF UPPER-OUTER QUADRANT OF RIGHT BREAST IN FEMALE, ESTROGEN RECEPTOR POSITIVE: Primary | ICD-10-CM

## 2021-10-18 DIAGNOSIS — R51.9 CHRONIC INTRACTABLE HEADACHE, UNSPECIFIED HEADACHE TYPE: ICD-10-CM

## 2021-10-18 DIAGNOSIS — C50.411 MALIGNANT NEOPLASM OF UPPER-OUTER QUADRANT OF RIGHT BREAST IN FEMALE, ESTROGEN RECEPTOR POSITIVE: Chronic | ICD-10-CM

## 2021-10-18 DIAGNOSIS — F41.9 ANXIETY: ICD-10-CM

## 2021-10-18 DIAGNOSIS — Z17.0 MALIGNANT NEOPLASM OF UPPER-OUTER QUADRANT OF RIGHT BREAST IN FEMALE, ESTROGEN RECEPTOR POSITIVE: Chronic | ICD-10-CM

## 2021-10-18 DIAGNOSIS — C50.411 MALIGNANT NEOPLASM OF UPPER-OUTER QUADRANT OF RIGHT BREAST IN FEMALE, ESTROGEN RECEPTOR POSITIVE: Primary | ICD-10-CM

## 2021-10-18 DIAGNOSIS — R61 SWEATING INCREASE: Primary | ICD-10-CM

## 2021-10-18 LAB
T4 FREE SERPL-MCNC: 0.92 NG/DL (ref 0.71–1.51)
TSH SERPL DL<=0.005 MIU/L-ACNC: 1.46 UIU/ML (ref 0.4–4)

## 2021-10-18 PROCEDURE — 99215 OFFICE O/P EST HI 40 MIN: CPT | Mod: PBBFAC | Performed by: RADIOLOGY

## 2021-10-18 PROCEDURE — 99215 OFFICE O/P EST HI 40 MIN: CPT | Mod: PBBFAC,27 | Performed by: FAMILY MEDICINE

## 2021-10-18 PROCEDURE — 99214 OFFICE O/P EST MOD 30 MIN: CPT | Mod: S$PBB,,, | Performed by: RADIOLOGY

## 2021-10-18 PROCEDURE — 86038 ANTINUCLEAR ANTIBODIES: CPT | Performed by: FAMILY MEDICINE

## 2021-10-18 PROCEDURE — 86039 ANTINUCLEAR ANTIBODIES (ANA): CPT | Performed by: FAMILY MEDICINE

## 2021-10-18 PROCEDURE — 99213 OFFICE O/P EST LOW 20 MIN: CPT | Mod: S$PBB,,, | Performed by: FAMILY MEDICINE

## 2021-10-18 PROCEDURE — 99213 PR OFFICE/OUTPT VISIT, EST, LEVL III, 20-29 MIN: ICD-10-PCS | Mod: S$PBB,,, | Performed by: FAMILY MEDICINE

## 2021-10-18 PROCEDURE — 99214 PR OFFICE/OUTPT VISIT, EST, LEVL IV, 30-39 MIN: ICD-10-PCS | Mod: S$PBB,,, | Performed by: RADIOLOGY

## 2021-10-18 PROCEDURE — 86235 NUCLEAR ANTIGEN ANTIBODY: CPT | Mod: 59 | Performed by: FAMILY MEDICINE

## 2021-10-18 PROCEDURE — 99999 PR PBB SHADOW E&M-EST. PATIENT-LVL V: CPT | Mod: PBBFAC,,, | Performed by: RADIOLOGY

## 2021-10-18 PROCEDURE — 99999 PR PBB SHADOW E&M-EST. PATIENT-LVL V: ICD-10-PCS | Mod: PBBFAC,,, | Performed by: FAMILY MEDICINE

## 2021-10-18 PROCEDURE — 99999 PR PBB SHADOW E&M-EST. PATIENT-LVL V: ICD-10-PCS | Mod: PBBFAC,,, | Performed by: RADIOLOGY

## 2021-10-18 PROCEDURE — 36415 COLL VENOUS BLD VENIPUNCTURE: CPT | Performed by: FAMILY MEDICINE

## 2021-10-18 PROCEDURE — 84443 ASSAY THYROID STIM HORMONE: CPT | Performed by: FAMILY MEDICINE

## 2021-10-18 PROCEDURE — 99999 PR PBB SHADOW E&M-EST. PATIENT-LVL V: CPT | Mod: PBBFAC,,, | Performed by: FAMILY MEDICINE

## 2021-10-18 PROCEDURE — 84439 ASSAY OF FREE THYROXINE: CPT | Performed by: FAMILY MEDICINE

## 2021-10-18 RX ORDER — LIDOCAINE 50 MG/G
2 PATCH TOPICAL DAILY
Qty: 90 PATCH | Refills: 1 | Status: SHIPPED | OUTPATIENT
Start: 2021-10-18 | End: 2021-12-29 | Stop reason: SDUPTHER

## 2021-10-18 RX ORDER — VENLAFAXINE HYDROCHLORIDE 75 MG/1
75 CAPSULE, EXTENDED RELEASE ORAL DAILY
Qty: 90 CAPSULE | Refills: 1 | Status: SHIPPED | OUTPATIENT
Start: 2021-10-18 | End: 2021-11-15 | Stop reason: SDUPTHER

## 2021-10-19 ENCOUNTER — PATIENT MESSAGE (OUTPATIENT)
Dept: INTERNAL MEDICINE | Facility: CLINIC | Age: 71
End: 2021-10-19
Payer: MEDICARE

## 2021-10-19 DIAGNOSIS — R76.8 ELEVATED ANTINUCLEAR ANTIBODY (ANA) LEVEL: ICD-10-CM

## 2021-10-19 DIAGNOSIS — R61 SWEATING INCREASE: Primary | ICD-10-CM

## 2021-10-19 LAB
ANA PATTERN 1: NORMAL
ANA SER QL IF: POSITIVE
ANA TITR SER IF: NORMAL {TITER}
THYROPEROXIDASE IGG SERPL-ACNC: <6 IU/ML

## 2021-10-19 RX ORDER — OXYCODONE AND ACETAMINOPHEN 7.5; 325 MG/1; MG/1
1 TABLET ORAL EVERY 4 HOURS PRN
Qty: 90 TABLET | Refills: 0 | Status: SHIPPED | OUTPATIENT
Start: 2021-10-19 | End: 2021-12-16 | Stop reason: SDUPTHER

## 2021-10-19 NOTE — PROGRESS NOTES
[facility] Hospital Medicine  Progress Note    Patient Name: Susu Luther  MRN: [id number]  Patient Class: IP- Inpatient   Admission Date: 5/18/2019  Length of Stay: 0 days  Attending Physician: Adolfo Good MD  Primary Care Provider: Tomasz Cage MD    Subjective:     Principal Problem:Expressive aphasia, Hypertensive Urgency    HPI:  Susu Luther is a 69 year old female with a PMHx of HTN, CVA (2009), Right breast CA, Lumpectomy 3/2019 who presented to the ER with c/o slurred speech x 1 month but worsened today around 2:00 AM. Associated symptoms include: headache. Pt reports her headache kept her up most the night to the point she took her BP at 3 AM. Pt reports systolic around 3 AM was 193. She reports taking a clonidine shortly thereafter. Headache and BP did not improve and subsequently came to ED to be evaluated. Pt is followed by Dr. Balbina Ashton at [facility]. Pt reports she tried to call and schedule appt with Dr. Ashton for slurred speech but not able to get in with her until September. ED workup showed: WBC count 8.46K, Hgb/Hct 10.8/34.6, , troponin 0.007. UA negative. CXR with no acute findings. CT head with no acute intracranial findings. MRI pending. Observation for aphasia.      Hospital Course:  CT head no acute findins. MRI negative for CVA. U/s no hemodynamically stenosis. Chest Xray negative for infiltrates. She admits to occasionally missing clonidine and she has gained quite a bit of weight over the past 2 years. B/P has remained high (180-200) with additional losartan for a total of 150 mg in 24 hours (takes 50 mg at home in the am). Will add HCTZ 12.5 mg and Propranolol 40 mg BID which will help B/P and HA. Patient stated Dr. Huynh, oncologist, consulted Dr. Arreguin for radiation and anastrozole 1 mg daily for 5 years. Patient stated Dr. Arreguin said her B/P was TOO high for radiation. Will recheck B/P later today.     Interval History: B/P 200/75 will  add HCTZ and propranolol 40mg BID    Review of Systems   Constitutional: Positive for activity change. Negative for appetite change, chills and fever.   HENT: Negative for trouble swallowing.    Eyes: Negative for visual disturbance.   Respiratory: Positive for cough (non-productive cough). Negative for apnea, choking, chest tightness, shortness of breath, wheezing and stridor.    Cardiovascular: Negative for chest pain, palpitations and leg swelling.   Gastrointestinal: Negative for abdominal pain, constipation, diarrhea, nausea and vomiting.   Genitourinary: Negative for decreased urine volume, difficulty urinating, dysuria, frequency and urgency.   Musculoskeletal: Negative for arthralgias, back pain, gait problem, joint swelling, myalgias, neck pain and neck stiffness.   Skin: Negative for color change.   Neurological: Positive for speech difficulty, weakness and headaches. Negative for dizziness, tremors, seizures, syncope, facial asymmetry, light-headedness and numbness.   Hematological: Does not bruise/bleed easily.   Psychiatric/Behavioral: Positive for dysphoric mood. Negative for agitation and behavioral problems.     ECHO:  CONCLUSIONS     1 - Mild left atrial enlargement.     2 - Concentric hypertrophy.     3 - No wall motion abnormalities.     4 - Normal left ventricular systolic function (EF 60-65%).     5 - Impaired LV relaxation, elevated LAP (grade 2 diastolic dysfunction).     6 - Normal right ventricular systolic function .     7 - The estimated PA systolic pressure is 36 mmHg.     8 - Mild tricuspid regurgitation.     Objective:     Vital Signs (Most Recent):  Temp: 97.4 °F (36.3 °C) (05/19/19 0745)  Pulse: 65 (05/19/19 1451)  Resp: 18 (05/19/19 1124)  BP: 136/78 (05/19/19 1451)  SpO2: 96 % (05/19/19 1124) Vital Signs (24h Range):  Temp:  [96.1 °F (35.6 °C)-97.9 °F (36.6 °C)] 97.4 °F (36.3 °C)  Pulse:  [63-86] 65  Resp:  [16-24] 18  SpO2:  [96 %-99 %] 96 %  BP: (130-200)/(72-98) 136/78      Weight: (!) 159.5 kg (351 lb 10.1 oz)  Body mass index is 51.93 kg/m².    Intake/Output Summary (Last 24 hours) at 5/19/2019 1553  Last data filed at 5/19/2019 0500  Gross per 24 hour   Intake 800 ml   Output --   Net 800 ml      Physical Exam   Constitutional: She is oriented to person, place, and time. She appears well-developed and well-nourished. She is cooperative. She is easily aroused.   Obese, pleasant woman   HENT:   Head: Normocephalic and atraumatic.   Eyes: EOM are normal.   Neck: Normal range of motion. Neck supple.   Cardiovascular: Normal rate, regular rhythm and intact distal pulses.   No murmur heard.  Pulmonary/Chest: Effort normal and breath sounds normal. No stridor. No respiratory distress. She has no wheezes. She has no rales. She exhibits no tenderness.   Abdominal: Soft. Bowel sounds are normal. She exhibits no distension. There is no tenderness. There is no rebound and no guarding.   Genitourinary:   Genitourinary Comments: Deferred   Musculoskeletal: She exhibits no edema, tenderness or deformity.   Neurological: She is alert, oriented to person, place, and time and easily aroused. She has normal strength. No sensory deficit. GCS eye subscore is 4. GCS verbal subscore is 5. GCS motor subscore is 6.   Skin: Skin is warm and dry.   Psychiatric: Her behavior is normal.   Dysphoric Mood   Nursing note and vitals reviewed.    Significant Labs:   CBC:   Recent Labs   Lab 05/18/19  1100 05/19/19  0416   WBC 8.46 7.70   HGB 10.8* 10.5*   HCT 34.6* 34.0*    273     CMP:   Recent Labs   Lab 05/18/19  1100 05/19/19  0416    143   K 4.0 3.7   * 114*   CO2 22* 20*   GLU 91 106   BUN 20 20   CREATININE 1.2 0.9   CALCIUM 9.4 9.2   PROT 6.9 6.5   ALBUMIN 2.9* 2.7*   BILITOT 0.2 0.3   ALKPHOS 129 123   AST 19 15   ALT 26 21   ANIONGAP 8 9   EGFRNONAA 47* >60       Significant Imaging: I have reviewed all pertinent imaging results/findings within the past 24 hours.   Imaging Results           US Carotid Bilateral (Final result)  Result time 05/18/19 15:26:25    Final result by Rosalio Stallworth Jr., MD (05/18/19 15:26:25)                 Impression:      No hemodynamically significant stenosis.      Electronically signed by: Rosalio Stallworth MD  Date:    05/18/2019  Time:    15:26             Narrative:    EXAMINATION:  US CAROTID BILATERAL    CLINICAL HISTORY:  expressive aphasia;    COMPARISON:  None    FINDINGS:  Right side: No significant atheromatous disease. No hemodynamically significant stenosis.  Peak systolic velocity in the right ICA is 90 cm/s.  Systolic velocity ratio is 1.7.  Antegrade flow in the right vertebral artery.    Left side: No significant atheromatous disease. No hemodynamically significant stenosis.  Peak systolic velocity in the left ICA is 60 cm/s.  Systolic velocity ratio is 1.6.  Antegrade flow in the left vertebral artery.    Stenosis of  % - validated velocity measurements with angiographic measurements, velocity criteria are extrapolated from diameter data as defined by the Society of Radiologists in Ultrasound Consensus Conference Radiology 2003; 229;340-346.                               MRI BRAIN WITHOUT CONTRAST (Final result)  Result time 05/18/19 14:30:05    Final result by Rosalio Stallworth Jr., MD (05/18/19 14:30:05)                 Impression:      No acute abnormality      Electronically signed by: Rosalio Stallworth MD  Date:    05/18/2019  Time:    14:30             Narrative:    EXAMINATION:  MRI BRAIN WITHOUT CONTRAST    CLINICAL HISTORY:  Stroke;.    TECHNIQUE:  Multiplanar multisequence MR imaging of the brain was performed without contrast.    COMPARISON:  CT 05/18/2019    FINDINGS:  Ventricular system is normal.  No abnormal signal change to indicate acute major vascular distribution ischemic infarction.  No hemorrhage.  No signal changes to indicate acute ischemic lacunar infarcts.  No involutional changes.  No advanced microvascular ischemic  change.  Cerebellar hemispheres and brainstem appear unremarkable.  Major intracranial arterial structures demonstrate appropriate flow void signal.  No sellar or suprasellar mass.  Craniocervical junction and upper cervical spinal cord appear unremarkable.    Visualized sinuses appear clear.  Mild amount of fluid in the left mastoid air cells.                               CT Head Without Contrast (Final result)  Result time 05/18/19 12:40:20    Final result by Rosalio Stallworth Jr., MD (05/18/19 12:40:20)                 Impression:      No acute intracranial findings evident.    All CT scans at this facility are performed  using dose modulation techniques as appropriate to performed exam including the following:  automated exposure control; adjustment of mA and/or kV according to the patients size (this includes techniques or standardized protocols for targeted exams where dose is matched to indication/reason for exam: i.e. extremities or head);  iterative reconstruction technique.      Electronically signed by: Rosalio Stallworth MD  Date:    05/18/2019  Time:    12:40             Narrative:    EXAMINATION:  CT HEAD WITHOUT CONTRAST    CLINICAL HISTORY:  Headache, chronic, new features or neuro deficit;    TECHNIQUE:  Noncontrast axial images of the head were obtained.    COMPARISON:  No comparison studies are available.    FINDINGS:  Ventricular system is normal.  No hydrocephalus.  No midline shift.  No abnormal density to indicate acute major vascular distribution ischemic infarction or hemorrhage.  No mass effect.  No extra-axial fluid collections.    Visualized paranasal sinuses and mastoid air cells appear clear.    No calvarial fracture.                               X-Ray Chest AP Portable (Final result)  Result time 05/18/19 11:16:50    Final result by Rosalio Stallworth Jr., MD (05/18/19 11:16:50)                 Impression:      No acute findings.      Electronically signed by: Rosalio Stallworth  MD  Date:    05/18/2019  Time:    11:16             Narrative:    EXAMINATION:  XR CHEST AP PORTABLE    CLINICAL HISTORY:  HTN;    COMPARISON:  05/06/2019    FINDINGS:  Heart size is at the upper limits of normal.  Shallow degree of inspiration..  Lungs appear clear of active disease.  No infiltrates or effusions.  No suspicious mass.                                  Assessment/Plan:      * Expressive aphasia  - Observation: neg CVA  - CT head with no acute intracranial findings  - MRI Negative CVA  - Carotid US hemodynamically significant stenosis  - 2D ECHO Dystolic dysfunction  - Lipid panel:   - A1C 5.9  - Neuro checks q 4 hours  -  will resume home medications: add HCTZ 12.5 mg and Propranolol 40 mg BID. Current systolic  mmHg  - No need to consult Neurology since MRI (-)   - PT/OT/SLP to evaluate and treat      Hypertensive urgency, malignant  - resume home medications including Losartan 50mg and Clonidine 0.1 TID  -add HCTZ 12.5 mg  -Add propranolol 40 mg BID      Malignant neoplasm of upper-outer quadrant of right breast in female, estrogen receptor positive  - Followed by Dr. Huynh and Dr. Arreguin   - S/p lumpectomy and sentinel node biopsy on 03/27/19  - Pt reports she has been unable to start radiation to due Severely elevated BP   - F/U with Dr. Huynh and Dr. Arreguin upon discharge    Class 3 severe obesity due to excess calories with serious comorbidity and body mass index (BMI) of 45.0 to 49.9 in adult  Needs weight loss diet, Low Sodium, no sweets or fried foods.        VTE Risk Mitigation (From admission, onward)        Ordered     Place sequential compression device  Until discontinued      05/19/19 0919     IP VTE HIGH RISK PATIENT  Once      05/18/19 1316     Place sequential compression device  Until discontinued      05/18/19 1316              Mary Garcia NP  Department of Hospital Medicine   [facility] -    IV discontinued, cath removed intact

## 2021-10-21 LAB
ANTI SM ANTIBODY: 0.07 RATIO (ref 0–0.99)
ANTI SM/RNP ANTIBODY: 0.08 RATIO (ref 0–0.99)
ANTI-SM INTERPRETATION: NEGATIVE
ANTI-SM/RNP INTERPRETATION: NEGATIVE
ANTI-SSA ANTIBODY: 0.07 RATIO (ref 0–0.99)
ANTI-SSA INTERPRETATION: NEGATIVE
ANTI-SSB ANTIBODY: 0.06 RATIO (ref 0–0.99)
ANTI-SSB INTERPRETATION: NEGATIVE
DSDNA AB SER-ACNC: NORMAL [IU]/ML

## 2021-10-22 ENCOUNTER — TELEPHONE (OUTPATIENT)
Dept: ADMINISTRATIVE | Facility: HOSPITAL | Age: 71
End: 2021-10-22

## 2021-10-24 DIAGNOSIS — Z17.0 MALIGNANT NEOPLASM OF UPPER-OUTER QUADRANT OF RIGHT BREAST IN FEMALE, ESTROGEN RECEPTOR POSITIVE: Chronic | ICD-10-CM

## 2021-10-24 DIAGNOSIS — F41.9 ANXIETY: ICD-10-CM

## 2021-10-24 DIAGNOSIS — C50.411 MALIGNANT NEOPLASM OF UPPER-OUTER QUADRANT OF RIGHT BREAST IN FEMALE, ESTROGEN RECEPTOR POSITIVE: Chronic | ICD-10-CM

## 2021-10-25 RX ORDER — ALPRAZOLAM 0.5 MG/1
0.5 TABLET ORAL 2 TIMES DAILY PRN
Qty: 60 TABLET | Refills: 0 | Status: SHIPPED | OUTPATIENT
Start: 2021-10-25 | End: 2021-11-15 | Stop reason: SDUPTHER

## 2021-11-04 ENCOUNTER — PATIENT OUTREACH (OUTPATIENT)
Dept: ADMINISTRATIVE | Facility: OTHER | Age: 71
End: 2021-11-04
Payer: MEDICARE

## 2021-11-04 DIAGNOSIS — Z12.31 ENCOUNTER FOR SCREENING MAMMOGRAM FOR BREAST CANCER: Primary | ICD-10-CM

## 2021-11-05 ENCOUNTER — OFFICE VISIT (OUTPATIENT)
Dept: RHEUMATOLOGY | Facility: CLINIC | Age: 71
End: 2021-11-05
Payer: MEDICARE

## 2021-11-05 ENCOUNTER — LAB VISIT (OUTPATIENT)
Dept: LAB | Facility: HOSPITAL | Age: 71
End: 2021-11-05
Attending: INTERNAL MEDICINE
Payer: MEDICARE

## 2021-11-05 ENCOUNTER — PATIENT MESSAGE (OUTPATIENT)
Dept: RHEUMATOLOGY | Facility: CLINIC | Age: 71
End: 2021-11-05

## 2021-11-05 VITALS
BODY MASS INDEX: 43.4 KG/M2 | HEART RATE: 80 BPM | HEIGHT: 69 IN | SYSTOLIC BLOOD PRESSURE: 97 MMHG | WEIGHT: 293 LBS | DIASTOLIC BLOOD PRESSURE: 73 MMHG

## 2021-11-05 DIAGNOSIS — M25.519 CHRONIC SHOULDER PAIN, UNSPECIFIED LATERALITY: ICD-10-CM

## 2021-11-05 DIAGNOSIS — G89.29 CHRONIC SHOULDER PAIN, UNSPECIFIED LATERALITY: ICD-10-CM

## 2021-11-05 DIAGNOSIS — R76.8 ELEVATED ANTINUCLEAR ANTIBODY (ANA) LEVEL: ICD-10-CM

## 2021-11-05 DIAGNOSIS — Z71.89 COUNSELING ON HEALTH PROMOTION AND DISEASE PREVENTION: ICD-10-CM

## 2021-11-05 DIAGNOSIS — R76.8 ELEVATED ANTINUCLEAR ANTIBODY (ANA) LEVEL: Primary | ICD-10-CM

## 2021-11-05 DIAGNOSIS — I10 ESSENTIAL HYPERTENSION: ICD-10-CM

## 2021-11-05 LAB
C3 SERPL-MCNC: 203 MG/DL (ref 50–180)
C4 SERPL-MCNC: 50 MG/DL (ref 11–44)
CREAT UR-MCNC: 176 MG/DL (ref 15–325)
PROT UR-MCNC: 15 MG/DL (ref 0–15)
PROT/CREAT UR: 0.09 MG/G{CREAT} (ref 0–0.2)

## 2021-11-05 PROCEDURE — 86160 COMPLEMENT ANTIGEN: CPT | Performed by: INTERNAL MEDICINE

## 2021-11-05 PROCEDURE — 86160 COMPLEMENT ANTIGEN: CPT | Mod: 59 | Performed by: INTERNAL MEDICINE

## 2021-11-05 PROCEDURE — 86225 DNA ANTIBODY NATIVE: CPT | Performed by: INTERNAL MEDICINE

## 2021-11-05 PROCEDURE — 99214 OFFICE O/P EST MOD 30 MIN: CPT | Mod: 25,S$PBB,ICN, | Performed by: INTERNAL MEDICINE

## 2021-11-05 PROCEDURE — 99214 PR OFFICE/OUTPT VISIT, EST, LEVL IV, 30-39 MIN: ICD-10-PCS | Mod: 25,S$PBB,ICN, | Performed by: INTERNAL MEDICINE

## 2021-11-05 PROCEDURE — 99999 PR PBB SHADOW E&M-EST. PATIENT-LVL V: ICD-10-PCS | Mod: PBBFAC,,, | Performed by: INTERNAL MEDICINE

## 2021-11-05 PROCEDURE — 82570 ASSAY OF URINE CREATININE: CPT | Performed by: INTERNAL MEDICINE

## 2021-11-05 PROCEDURE — 20610 LARGE JOINT ASPIRATION/INJECTION: R SUBACROMIAL BURSA: ICD-10-PCS | Mod: S$PBB,RT,ICN, | Performed by: INTERNAL MEDICINE

## 2021-11-05 PROCEDURE — 99999 PR PBB SHADOW E&M-EST. PATIENT-LVL V: CPT | Mod: PBBFAC,,, | Performed by: INTERNAL MEDICINE

## 2021-11-05 PROCEDURE — 20610 DRAIN/INJ JOINT/BURSA W/O US: CPT | Mod: PBBFAC | Performed by: INTERNAL MEDICINE

## 2021-11-05 PROCEDURE — 36415 COLL VENOUS BLD VENIPUNCTURE: CPT | Performed by: INTERNAL MEDICINE

## 2021-11-05 PROCEDURE — 99215 OFFICE O/P EST HI 40 MIN: CPT | Mod: PBBFAC,25 | Performed by: INTERNAL MEDICINE

## 2021-11-05 RX ORDER — TELMISARTAN 80 MG/1
1 TABLET ORAL DAILY
COMMUNITY
Start: 2021-01-22 | End: 2021-11-15 | Stop reason: SDUPTHER

## 2021-11-05 RX ORDER — AMLODIPINE BESYLATE 10 MG/1
10 TABLET ORAL DAILY
Qty: 90 TABLET | Refills: 2 | Status: SHIPPED | OUTPATIENT
Start: 2021-11-05 | End: 2021-12-29 | Stop reason: SDUPTHER

## 2021-11-05 RX ORDER — CARVEDILOL 12.5 MG/1
1 TABLET ORAL DAILY
COMMUNITY
Start: 2021-03-17 | End: 2021-11-15 | Stop reason: SDUPTHER

## 2021-11-05 RX ORDER — TRIAMCINOLONE ACETONIDE 40 MG/ML
40 INJECTION, SUSPENSION INTRA-ARTICULAR; INTRAMUSCULAR
Status: DISCONTINUED | OUTPATIENT
Start: 2021-11-05 | End: 2021-11-05 | Stop reason: HOSPADM

## 2021-11-05 RX ORDER — CLONIDINE HYDROCHLORIDE 0.1 MG/1
1 TABLET ORAL 3 TIMES DAILY
COMMUNITY
Start: 2021-03-18 | End: 2021-12-29 | Stop reason: SDUPTHER

## 2021-11-05 RX ADMIN — TRIAMCINOLONE ACETONIDE 40 MG: 40 INJECTION, SUSPENSION INTRA-ARTICULAR; INTRAMUSCULAR at 08:11

## 2021-11-08 LAB — DSDNA AB SER-ACNC: NORMAL [IU]/ML

## 2021-11-11 ENCOUNTER — SPECIALTY PHARMACY (OUTPATIENT)
Dept: PHARMACY | Facility: CLINIC | Age: 71
End: 2021-11-11
Payer: MEDICARE

## 2021-11-11 DIAGNOSIS — Z17.0 MALIGNANT NEOPLASM OF UPPER-OUTER QUADRANT OF RIGHT BREAST IN FEMALE, ESTROGEN RECEPTOR POSITIVE: Chronic | ICD-10-CM

## 2021-11-11 DIAGNOSIS — C50.411 MALIGNANT NEOPLASM OF UPPER-OUTER QUADRANT OF RIGHT BREAST IN FEMALE, ESTROGEN RECEPTOR POSITIVE: Chronic | ICD-10-CM

## 2021-11-11 RX ORDER — GABAPENTIN 600 MG/1
600 TABLET ORAL 3 TIMES DAILY
Qty: 90 TABLET | Refills: 3 | Status: SHIPPED | OUTPATIENT
Start: 2021-11-11 | End: 2021-11-15 | Stop reason: SDUPTHER

## 2021-11-15 ENCOUNTER — PATIENT MESSAGE (OUTPATIENT)
Dept: PHARMACY | Facility: CLINIC | Age: 71
End: 2021-11-15
Payer: MEDICARE

## 2021-11-15 ENCOUNTER — PATIENT MESSAGE (OUTPATIENT)
Dept: INTERNAL MEDICINE | Facility: CLINIC | Age: 71
End: 2021-11-15
Payer: MEDICARE

## 2021-11-15 DIAGNOSIS — F41.9 ANXIETY: Primary | ICD-10-CM

## 2021-11-15 DIAGNOSIS — Z17.0 MALIGNANT NEOPLASM OF UPPER-OUTER QUADRANT OF RIGHT BREAST IN FEMALE, ESTROGEN RECEPTOR POSITIVE: Chronic | ICD-10-CM

## 2021-11-15 DIAGNOSIS — R61 SWEATING INCREASE: ICD-10-CM

## 2021-11-15 DIAGNOSIS — I10 ESSENTIAL HYPERTENSION: ICD-10-CM

## 2021-11-15 DIAGNOSIS — R51.9 NONINTRACTABLE HEADACHE, UNSPECIFIED CHRONICITY PATTERN, UNSPECIFIED HEADACHE TYPE: ICD-10-CM

## 2021-11-15 DIAGNOSIS — F32.1 MAJOR DEPRESSIVE DISORDER, SINGLE EPISODE, MODERATE WITH ANXIOUS DISTRESS: ICD-10-CM

## 2021-11-15 DIAGNOSIS — Z86.73 HISTORY OF CVA (CEREBROVASCULAR ACCIDENT): ICD-10-CM

## 2021-11-15 DIAGNOSIS — C50.411 MALIGNANT NEOPLASM OF UPPER-OUTER QUADRANT OF RIGHT BREAST IN FEMALE, ESTROGEN RECEPTOR POSITIVE: Chronic | ICD-10-CM

## 2021-11-16 RX ORDER — CARVEDILOL 12.5 MG/1
12.5 TABLET ORAL DAILY
Qty: 90 TABLET | Refills: 1 | Status: SHIPPED | OUTPATIENT
Start: 2021-11-16 | End: 2021-12-29 | Stop reason: SDUPTHER

## 2021-11-16 RX ORDER — VENLAFAXINE HYDROCHLORIDE 75 MG/1
75 CAPSULE, EXTENDED RELEASE ORAL DAILY
Qty: 90 CAPSULE | Refills: 1 | Status: SHIPPED | OUTPATIENT
Start: 2021-11-16 | End: 2021-12-03 | Stop reason: SDUPTHER

## 2021-11-16 RX ORDER — ALPRAZOLAM 0.5 MG/1
0.5 TABLET ORAL 2 TIMES DAILY PRN
Qty: 60 TABLET | Refills: 0 | Status: SHIPPED | OUTPATIENT
Start: 2021-11-16 | End: 2021-12-16 | Stop reason: SDUPTHER

## 2021-11-16 RX ORDER — GABAPENTIN 600 MG/1
600 TABLET ORAL 3 TIMES DAILY
Qty: 270 TABLET | Refills: 1 | Status: SHIPPED | OUTPATIENT
Start: 2021-11-16 | End: 2021-12-29 | Stop reason: SDUPTHER

## 2021-11-16 RX ORDER — TELMISARTAN 80 MG/1
80 TABLET ORAL DAILY
Qty: 90 TABLET | Refills: 1 | Status: SHIPPED | OUTPATIENT
Start: 2021-11-16 | End: 2021-12-29 | Stop reason: SDUPTHER

## 2021-11-16 RX ORDER — ATORVASTATIN CALCIUM 40 MG/1
40 TABLET, FILM COATED ORAL DAILY
Qty: 90 TABLET | Refills: 3 | Status: SHIPPED | OUTPATIENT
Start: 2021-11-16 | End: 2021-12-29 | Stop reason: SDUPTHER

## 2021-11-16 RX ORDER — BUTALBITAL, ACETAMINOPHEN AND CAFFEINE 50; 325; 40 MG/1; MG/1; MG/1
1 CAPSULE ORAL 2 TIMES DAILY PRN
Qty: 90 CAPSULE | Refills: 1 | Status: SHIPPED | OUTPATIENT
Start: 2021-11-16 | End: 2021-12-16 | Stop reason: SDUPTHER

## 2021-12-03 DIAGNOSIS — R61 SWEATING INCREASE: ICD-10-CM

## 2021-12-03 DIAGNOSIS — F41.9 ANXIETY: ICD-10-CM

## 2021-12-03 RX ORDER — VENLAFAXINE HYDROCHLORIDE 75 MG/1
75 CAPSULE, EXTENDED RELEASE ORAL DAILY
Qty: 90 CAPSULE | Refills: 1 | Status: SHIPPED | OUTPATIENT
Start: 2021-12-03 | End: 2021-12-16 | Stop reason: SDUPTHER

## 2021-12-06 ENCOUNTER — PATIENT MESSAGE (OUTPATIENT)
Dept: INTERNAL MEDICINE | Facility: CLINIC | Age: 71
End: 2021-12-06
Payer: MEDICARE

## 2021-12-14 ENCOUNTER — TELEPHONE (OUTPATIENT)
Dept: HEMATOLOGY/ONCOLOGY | Facility: CLINIC | Age: 71
End: 2021-12-14
Payer: MEDICARE

## 2021-12-16 ENCOUNTER — OFFICE VISIT (OUTPATIENT)
Dept: INTERNAL MEDICINE | Facility: CLINIC | Age: 71
End: 2021-12-16
Payer: MEDICARE

## 2021-12-16 DIAGNOSIS — R51.9 NONINTRACTABLE HEADACHE, UNSPECIFIED CHRONICITY PATTERN, UNSPECIFIED HEADACHE TYPE: ICD-10-CM

## 2021-12-16 DIAGNOSIS — Z17.0 MALIGNANT NEOPLASM OF UPPER-OUTER QUADRANT OF RIGHT BREAST IN FEMALE, ESTROGEN RECEPTOR POSITIVE: Chronic | ICD-10-CM

## 2021-12-16 DIAGNOSIS — R61 SWEATING INCREASE: ICD-10-CM

## 2021-12-16 DIAGNOSIS — I10 UNCONTROLLED HYPERTENSION: ICD-10-CM

## 2021-12-16 DIAGNOSIS — C50.411 MALIGNANT NEOPLASM OF UPPER-OUTER QUADRANT OF RIGHT BREAST IN FEMALE, ESTROGEN RECEPTOR POSITIVE: Chronic | ICD-10-CM

## 2021-12-16 DIAGNOSIS — F41.9 ANXIETY: ICD-10-CM

## 2021-12-16 PROCEDURE — 99213 PR OFFICE/OUTPT VISIT, EST, LEVL III, 20-29 MIN: ICD-10-PCS | Mod: 95,,, | Performed by: FAMILY MEDICINE

## 2021-12-16 PROCEDURE — 99213 OFFICE O/P EST LOW 20 MIN: CPT | Mod: 95,,, | Performed by: FAMILY MEDICINE

## 2021-12-16 RX ORDER — ALPRAZOLAM 0.5 MG/1
0.5 TABLET ORAL 2 TIMES DAILY PRN
Qty: 60 TABLET | Refills: 0 | Status: SHIPPED | OUTPATIENT
Start: 2021-12-16 | End: 2021-12-17 | Stop reason: SDUPTHER

## 2021-12-16 RX ORDER — BUTALBITAL, ACETAMINOPHEN AND CAFFEINE 50; 325; 40 MG/1; MG/1; MG/1
1 CAPSULE ORAL 2 TIMES DAILY PRN
Qty: 90 CAPSULE | Refills: 1 | Status: SHIPPED | OUTPATIENT
Start: 2021-12-16 | End: 2021-12-17 | Stop reason: SDUPTHER

## 2021-12-16 RX ORDER — ZOLPIDEM TARTRATE 5 MG/1
5 TABLET ORAL NIGHTLY PRN
Qty: 60 TABLET | Refills: 1 | Status: SHIPPED | OUTPATIENT
Start: 2021-12-16 | End: 2022-04-06 | Stop reason: SDUPTHER

## 2021-12-16 RX ORDER — FUROSEMIDE 20 MG/1
TABLET ORAL
Qty: 90 TABLET | Refills: 0 | Status: SHIPPED | OUTPATIENT
Start: 2021-12-16 | End: 2021-12-29 | Stop reason: SDUPTHER

## 2021-12-16 RX ORDER — VENLAFAXINE HYDROCHLORIDE 75 MG/1
75 CAPSULE, EXTENDED RELEASE ORAL DAILY
Qty: 90 CAPSULE | Refills: 1 | Status: SHIPPED | OUTPATIENT
Start: 2021-12-16 | End: 2021-12-29 | Stop reason: SDUPTHER

## 2021-12-16 RX ORDER — OXYCODONE AND ACETAMINOPHEN 7.5; 325 MG/1; MG/1
1 TABLET ORAL EVERY 4 HOURS PRN
Qty: 90 TABLET | Refills: 0 | Status: SHIPPED | OUTPATIENT
Start: 2021-12-16 | End: 2022-02-15 | Stop reason: ALTCHOICE

## 2021-12-17 ENCOUNTER — PATIENT MESSAGE (OUTPATIENT)
Dept: HEMATOLOGY/ONCOLOGY | Facility: CLINIC | Age: 71
End: 2021-12-17
Payer: MEDICARE

## 2021-12-17 ENCOUNTER — PATIENT MESSAGE (OUTPATIENT)
Dept: INTERNAL MEDICINE | Facility: CLINIC | Age: 71
End: 2021-12-17
Payer: MEDICARE

## 2021-12-17 ENCOUNTER — TELEPHONE (OUTPATIENT)
Dept: HEMATOLOGY/ONCOLOGY | Facility: CLINIC | Age: 71
End: 2021-12-17
Payer: MEDICARE

## 2021-12-17 DIAGNOSIS — R51.9 NONINTRACTABLE HEADACHE, UNSPECIFIED CHRONICITY PATTERN, UNSPECIFIED HEADACHE TYPE: ICD-10-CM

## 2021-12-17 DIAGNOSIS — F41.9 ANXIETY: ICD-10-CM

## 2021-12-17 DIAGNOSIS — Z17.0 MALIGNANT NEOPLASM OF UPPER-OUTER QUADRANT OF RIGHT BREAST IN FEMALE, ESTROGEN RECEPTOR POSITIVE: Chronic | ICD-10-CM

## 2021-12-17 DIAGNOSIS — C50.411 MALIGNANT NEOPLASM OF UPPER-OUTER QUADRANT OF RIGHT BREAST IN FEMALE, ESTROGEN RECEPTOR POSITIVE: Chronic | ICD-10-CM

## 2021-12-17 RX ORDER — BUTALBITAL, ACETAMINOPHEN AND CAFFEINE 50; 325; 40 MG/1; MG/1; MG/1
1 CAPSULE ORAL 2 TIMES DAILY PRN
Qty: 90 CAPSULE | Refills: 1 | Status: SHIPPED | OUTPATIENT
Start: 2021-12-17 | End: 2021-12-29 | Stop reason: SDUPTHER

## 2021-12-17 RX ORDER — ALPRAZOLAM 0.5 MG/1
0.5 TABLET ORAL 2 TIMES DAILY PRN
Qty: 60 TABLET | Refills: 0 | Status: SHIPPED | OUTPATIENT
Start: 2021-12-17 | End: 2022-01-19 | Stop reason: SDUPTHER

## 2021-12-20 DIAGNOSIS — Z17.0 MALIGNANT NEOPLASM OF UPPER-OUTER QUADRANT OF RIGHT BREAST IN FEMALE, ESTROGEN RECEPTOR POSITIVE: Chronic | ICD-10-CM

## 2021-12-20 DIAGNOSIS — F41.9 ANXIETY: ICD-10-CM

## 2021-12-20 DIAGNOSIS — C50.411 MALIGNANT NEOPLASM OF UPPER-OUTER QUADRANT OF RIGHT BREAST IN FEMALE, ESTROGEN RECEPTOR POSITIVE: Chronic | ICD-10-CM

## 2021-12-20 DIAGNOSIS — R51.9 NONINTRACTABLE HEADACHE, UNSPECIFIED CHRONICITY PATTERN, UNSPECIFIED HEADACHE TYPE: ICD-10-CM

## 2021-12-20 RX ORDER — BUTALBITAL, ACETAMINOPHEN AND CAFFEINE 50; 325; 40 MG/1; MG/1; MG/1
1 CAPSULE ORAL 2 TIMES DAILY PRN
Qty: 90 CAPSULE | Refills: 1 | Status: SHIPPED | OUTPATIENT
Start: 2021-12-20 | End: 2022-02-15 | Stop reason: SDUPTHER

## 2021-12-20 RX ORDER — ALPRAZOLAM 0.5 MG/1
0.5 TABLET ORAL 2 TIMES DAILY PRN
Qty: 60 TABLET | Refills: 0 | Status: SHIPPED | OUTPATIENT
Start: 2021-12-20 | End: 2022-02-15 | Stop reason: SDUPTHER

## 2021-12-27 ENCOUNTER — PATIENT MESSAGE (OUTPATIENT)
Dept: ADMINISTRATIVE | Facility: OTHER | Age: 71
End: 2021-12-27
Payer: MEDICARE

## 2021-12-29 ENCOUNTER — PATIENT MESSAGE (OUTPATIENT)
Dept: HEMATOLOGY/ONCOLOGY | Facility: CLINIC | Age: 71
End: 2021-12-29
Payer: MEDICARE

## 2021-12-29 ENCOUNTER — TELEPHONE (OUTPATIENT)
Dept: HEMATOLOGY/ONCOLOGY | Facility: CLINIC | Age: 71
End: 2021-12-29
Payer: MEDICARE

## 2021-12-29 ENCOUNTER — PATIENT MESSAGE (OUTPATIENT)
Dept: INTERNAL MEDICINE | Facility: CLINIC | Age: 71
End: 2021-12-29
Payer: MEDICARE

## 2021-12-29 DIAGNOSIS — Z86.73 HISTORY OF CVA (CEREBROVASCULAR ACCIDENT): ICD-10-CM

## 2021-12-29 DIAGNOSIS — R61 SWEATING INCREASE: ICD-10-CM

## 2021-12-29 DIAGNOSIS — Z17.0 MALIGNANT NEOPLASM OF UPPER-OUTER QUADRANT OF RIGHT BREAST IN FEMALE, ESTROGEN RECEPTOR POSITIVE: Chronic | ICD-10-CM

## 2021-12-29 DIAGNOSIS — R51.9 NONINTRACTABLE HEADACHE, UNSPECIFIED CHRONICITY PATTERN, UNSPECIFIED HEADACHE TYPE: ICD-10-CM

## 2021-12-29 DIAGNOSIS — F41.9 ANXIETY: ICD-10-CM

## 2021-12-29 DIAGNOSIS — C50.411 MALIGNANT NEOPLASM OF UPPER-OUTER QUADRANT OF RIGHT BREAST IN FEMALE, ESTROGEN RECEPTOR POSITIVE: Chronic | ICD-10-CM

## 2021-12-29 DIAGNOSIS — I10 UNCONTROLLED HYPERTENSION: ICD-10-CM

## 2021-12-29 DIAGNOSIS — R06.02 SHORTNESS OF BREATH ON EXERTION: ICD-10-CM

## 2021-12-29 DIAGNOSIS — I10 ESSENTIAL HYPERTENSION: ICD-10-CM

## 2021-12-29 RX ORDER — LETROZOLE 2.5 MG/1
2.5 TABLET, FILM COATED ORAL DAILY
Qty: 30 TABLET | Refills: 11 | Status: SHIPPED | OUTPATIENT
Start: 2021-12-29 | End: 2021-12-29 | Stop reason: SDUPTHER

## 2021-12-30 RX ORDER — DOXAZOSIN 4 MG/1
4 TABLET ORAL NIGHTLY
Qty: 90 TABLET | Refills: 0 | Status: SHIPPED | OUTPATIENT
Start: 2021-12-30 | End: 2022-06-17 | Stop reason: SDUPTHER

## 2021-12-30 RX ORDER — HYDROXYZINE HYDROCHLORIDE 25 MG/1
25 TABLET, FILM COATED ORAL NIGHTLY
Qty: 60 TABLET | Refills: 0 | Status: SHIPPED | OUTPATIENT
Start: 2021-12-30 | End: 2022-07-11

## 2021-12-30 RX ORDER — ALBUTEROL SULFATE 90 UG/1
2 AEROSOL, METERED RESPIRATORY (INHALATION) EVERY 4 HOURS PRN
Qty: 18 G | Refills: 11 | Status: SHIPPED | OUTPATIENT
Start: 2021-12-30 | End: 2023-03-09

## 2021-12-30 RX ORDER — GABAPENTIN 600 MG/1
600 TABLET ORAL 3 TIMES DAILY
Qty: 270 TABLET | Refills: 1 | Status: SHIPPED | OUTPATIENT
Start: 2021-12-30 | End: 2022-04-06 | Stop reason: SDUPTHER

## 2021-12-30 RX ORDER — ZOLPIDEM TARTRATE 5 MG/1
5 TABLET ORAL NIGHTLY PRN
Qty: 60 TABLET | Refills: 1 | OUTPATIENT
Start: 2021-12-30 | End: 2022-06-30

## 2021-12-30 RX ORDER — ASPIRIN 325 MG
50000 TABLET, DELAYED RELEASE (ENTERIC COATED) ORAL WEEKLY
Qty: 12 CAPSULE | Refills: 0 | Status: SHIPPED | OUTPATIENT
Start: 2021-12-30 | End: 2023-08-02 | Stop reason: SDUPTHER

## 2021-12-30 RX ORDER — LETROZOLE 2.5 MG/1
2.5 TABLET, FILM COATED ORAL DAILY
Qty: 30 TABLET | Refills: 11 | OUTPATIENT
Start: 2021-12-30 | End: 2022-04-06 | Stop reason: SDUPTHER

## 2021-12-30 RX ORDER — NEOMYCIN SULFATE, POLYMYXIN B SULFATE AND DEXAMETHASONE 1; 3.5; 1 MG/ML; MG/ML; [USP'U]/ML
1 SUSPENSION OPHTHALMIC 4 TIMES DAILY
OUTPATIENT
Start: 2021-12-30

## 2021-12-30 RX ORDER — BUTALBITAL, ACETAMINOPHEN AND CAFFEINE 50; 325; 40 MG/1; MG/1; MG/1
1 CAPSULE ORAL 2 TIMES DAILY PRN
Qty: 90 CAPSULE | Refills: 1 | Status: SHIPPED | OUTPATIENT
Start: 2021-12-30 | End: 2022-02-15 | Stop reason: ALTCHOICE

## 2021-12-30 RX ORDER — TELMISARTAN 80 MG/1
80 TABLET ORAL DAILY
Qty: 90 TABLET | Refills: 1 | Status: SHIPPED | OUTPATIENT
Start: 2021-12-30 | End: 2022-03-24

## 2021-12-30 RX ORDER — ALPRAZOLAM 0.5 MG/1
0.5 TABLET ORAL 2 TIMES DAILY PRN
Qty: 60 TABLET | Refills: 0 | OUTPATIENT
Start: 2021-12-30

## 2021-12-30 RX ORDER — HYDRALAZINE HYDROCHLORIDE 100 MG/1
100 TABLET, FILM COATED ORAL EVERY 8 HOURS
Qty: 90 TABLET | Refills: 11 | Status: SHIPPED | OUTPATIENT
Start: 2021-12-30 | End: 2022-01-15

## 2021-12-30 RX ORDER — FUROSEMIDE 20 MG/1
TABLET ORAL
Qty: 90 TABLET | Refills: 0 | Status: SHIPPED | OUTPATIENT
Start: 2021-12-30 | End: 2022-04-06 | Stop reason: SDUPTHER

## 2021-12-30 RX ORDER — VENLAFAXINE HYDROCHLORIDE 75 MG/1
75 CAPSULE, EXTENDED RELEASE ORAL DAILY
Qty: 90 CAPSULE | Refills: 1 | Status: SHIPPED | OUTPATIENT
Start: 2021-12-30 | End: 2022-02-21

## 2021-12-30 RX ORDER — CARVEDILOL 12.5 MG/1
12.5 TABLET ORAL DAILY
Qty: 90 TABLET | Refills: 1 | Status: SHIPPED | OUTPATIENT
Start: 2021-12-30 | End: 2022-01-15

## 2021-12-30 RX ORDER — HYDROCHLOROTHIAZIDE 50 MG/1
50 TABLET ORAL DAILY
Qty: 90 TABLET | Refills: 3 | Status: SHIPPED | OUTPATIENT
Start: 2021-12-30 | End: 2022-04-07 | Stop reason: SDUPTHER

## 2021-12-30 RX ORDER — LIDOCAINE 50 MG/G
2 PATCH TOPICAL DAILY
Qty: 90 PATCH | Refills: 1 | Status: SHIPPED | OUTPATIENT
Start: 2021-12-30 | End: 2023-03-23 | Stop reason: SDUPTHER

## 2021-12-30 RX ORDER — CLONIDINE HYDROCHLORIDE 0.1 MG/1
0.1 TABLET ORAL 3 TIMES DAILY
Qty: 90 TABLET | Refills: 1 | Status: SHIPPED | OUTPATIENT
Start: 2021-12-30 | End: 2022-02-28

## 2021-12-30 RX ORDER — AMLODIPINE BESYLATE 10 MG/1
10 TABLET ORAL DAILY
Qty: 90 TABLET | Refills: 2 | Status: SHIPPED | OUTPATIENT
Start: 2021-12-30 | End: 2022-02-28

## 2021-12-30 RX ORDER — NALOXONE HYDROCHLORIDE 4 MG/.1ML
SPRAY NASAL
Qty: 1 EACH | Refills: 0 | Status: SHIPPED | OUTPATIENT
Start: 2021-12-30 | End: 2022-02-15 | Stop reason: ALTCHOICE

## 2021-12-30 RX ORDER — ATORVASTATIN CALCIUM 40 MG/1
40 TABLET, FILM COATED ORAL DAILY
Qty: 90 TABLET | Refills: 3 | Status: SHIPPED | OUTPATIENT
Start: 2021-12-30 | End: 2022-07-11 | Stop reason: SDUPTHER

## 2022-01-02 ENCOUNTER — PATIENT MESSAGE (OUTPATIENT)
Dept: ADMINISTRATIVE | Facility: HOSPITAL | Age: 72
End: 2022-01-02
Payer: MEDICARE

## 2022-01-06 NOTE — TELEPHONE ENCOUNTER
SW received call to fax over path report for MD. Pt contacted SW to ask about medications needing to be filled through her  insurance before she runs out. SW sent message to pcp staff.    F/u as needs arise

## 2022-01-14 DIAGNOSIS — Z17.0 MALIGNANT NEOPLASM OF UPPER-OUTER QUADRANT OF RIGHT BREAST IN FEMALE, ESTROGEN RECEPTOR POSITIVE: Chronic | ICD-10-CM

## 2022-01-14 DIAGNOSIS — C50.411 MALIGNANT NEOPLASM OF UPPER-OUTER QUADRANT OF RIGHT BREAST IN FEMALE, ESTROGEN RECEPTOR POSITIVE: Chronic | ICD-10-CM

## 2022-01-14 DIAGNOSIS — F41.9 ANXIETY: ICD-10-CM

## 2022-01-14 NOTE — TELEPHONE ENCOUNTER
No new care gaps identified.  Powered by [employer]. Reference number: [id number].   1/14/2022 4:31:54 PM CST

## 2022-01-17 ENCOUNTER — PATIENT MESSAGE (OUTPATIENT)
Dept: INTERNAL MEDICINE | Facility: CLINIC | Age: 72
End: 2022-01-17
Payer: MEDICARE

## 2022-01-17 DIAGNOSIS — C50.411 MALIGNANT NEOPLASM OF UPPER-OUTER QUADRANT OF RIGHT BREAST IN FEMALE, ESTROGEN RECEPTOR POSITIVE: Chronic | ICD-10-CM

## 2022-01-17 DIAGNOSIS — F41.9 ANXIETY: ICD-10-CM

## 2022-01-17 DIAGNOSIS — Z17.0 MALIGNANT NEOPLASM OF UPPER-OUTER QUADRANT OF RIGHT BREAST IN FEMALE, ESTROGEN RECEPTOR POSITIVE: Chronic | ICD-10-CM

## 2022-01-18 ENCOUNTER — PATIENT MESSAGE (OUTPATIENT)
Dept: INTERNAL MEDICINE | Facility: CLINIC | Age: 72
End: 2022-01-18
Payer: MEDICARE

## 2022-01-18 RX ORDER — ALPRAZOLAM 0.5 MG/1
TABLET ORAL
Qty: 60 TABLET | Refills: 0 | OUTPATIENT
Start: 2022-01-18

## 2022-01-19 RX ORDER — ALPRAZOLAM 0.5 MG/1
0.5 TABLET ORAL 2 TIMES DAILY PRN
Qty: 60 TABLET | Refills: 0 | Status: SHIPPED | OUTPATIENT
Start: 2022-01-19 | End: 2022-02-18 | Stop reason: SDUPTHER

## 2022-01-31 ENCOUNTER — TELEPHONE (OUTPATIENT)
Dept: INTERNAL MEDICINE | Facility: CLINIC | Age: 72
End: 2022-01-31

## 2022-01-31 ENCOUNTER — PATIENT MESSAGE (OUTPATIENT)
Dept: INTERNAL MEDICINE | Facility: CLINIC | Age: 72
End: 2022-01-31
Payer: MEDICARE

## 2022-01-31 NOTE — TELEPHONE ENCOUNTER
The pt was contacted and she will call and see if there is an opening on [place] with Marita Ernandez's nurse and if nothing is available over there she will call me back for assistance with an appt here. //kah

## 2022-02-01 ENCOUNTER — OFFICE VISIT (OUTPATIENT)
Dept: INTERNAL MEDICINE | Facility: CLINIC | Age: 72
End: 2022-02-01
Payer: OTHER GOVERNMENT

## 2022-02-01 ENCOUNTER — PATIENT MESSAGE (OUTPATIENT)
Dept: INTERNAL MEDICINE | Facility: CLINIC | Age: 72
End: 2022-02-01

## 2022-02-01 DIAGNOSIS — E66.01 MORBID OBESITY WITH BMI OF 45.0-49.9, ADULT: ICD-10-CM

## 2022-02-01 DIAGNOSIS — C50.411 MALIGNANT NEOPLASM OF UPPER-OUTER QUADRANT OF RIGHT BREAST IN FEMALE, ESTROGEN RECEPTOR POSITIVE: ICD-10-CM

## 2022-02-01 DIAGNOSIS — R29.6 FREQUENT FALLS: Primary | ICD-10-CM

## 2022-02-01 DIAGNOSIS — I72.8 SPLENIC ARTERY ANEURYSM: ICD-10-CM

## 2022-02-01 DIAGNOSIS — R42 DIZZINESS: ICD-10-CM

## 2022-02-01 DIAGNOSIS — F32.1 MAJOR DEPRESSIVE DISORDER, SINGLE EPISODE, MODERATE WITH ANXIOUS DISTRESS: ICD-10-CM

## 2022-02-01 DIAGNOSIS — Z17.0 MALIGNANT NEOPLASM OF UPPER-OUTER QUADRANT OF RIGHT BREAST IN FEMALE, ESTROGEN RECEPTOR POSITIVE: ICD-10-CM

## 2022-02-01 DIAGNOSIS — I50.32 CHRONIC DIASTOLIC CONGESTIVE HEART FAILURE: ICD-10-CM

## 2022-02-01 DIAGNOSIS — N18.31 CHRONIC KIDNEY DISEASE, STAGE 3A: ICD-10-CM

## 2022-02-01 PROCEDURE — 99214 OFFICE O/P EST MOD 30 MIN: CPT | Mod: 95,,, | Performed by: PHYSICIAN ASSISTANT

## 2022-02-01 PROCEDURE — 99499 RISK ADDL DX/OHS AUDIT: ICD-10-PCS | Mod: 95,,, | Performed by: PHYSICIAN ASSISTANT

## 2022-02-01 PROCEDURE — 99214 PR OFFICE/OUTPT VISIT, EST, LEVL IV, 30-39 MIN: ICD-10-PCS | Mod: 95,,, | Performed by: PHYSICIAN ASSISTANT

## 2022-02-01 PROCEDURE — 99499 UNLISTED E&M SERVICE: CPT | Mod: 95,,, | Performed by: PHYSICIAN ASSISTANT

## 2022-02-01 NOTE — ASSESSMENT & PLAN NOTE
Wt Readings from Last 10 Encounters:   11/05/21 135.2 kg (298 lb 1 oz)   10/18/21 (!) 138.5 kg (305 lb 5.4 oz)   10/18/21 (!) 138.5 kg (305 lb 6.4 oz)   09/28/21 (!) 138.7 kg (305 lb 12.5 oz)   09/23/21 (!) 137.4 kg (302 lb 14.6 oz)   07/30/21 136 kg (299 lb 13.2 oz)   07/12/21 (!) 138.3 kg (304 lb 14.3 oz)   06/28/21 (!) 136.9 kg (301 lb 13 oz)   06/04/21 135.6 kg (299 lb)   04/29/21 134.7 kg (296 lb 15.4 oz)     There is no height or weight on file to calculate BMI.

## 2022-02-01 NOTE — PROGRESS NOTES
Subjective:       Patient ID: Susu Luther is a 72 y.o. female.    Chief Complaint: No chief complaint on file.      The patient location is: home  The chief complaint leading to consultation is: fall    Visit type: audiovisual    Face to Face time with patient: 22 minutes (chart review started 130; video started 130; video ended 152)  25 minutes of total time spent on the encounter, which includes face to face time and non-face to face time preparing to see the patient (eg, review of tests), Obtaining and/or reviewing separately obtained history, Documenting clinical information in the electronic or other health record, Independently interpreting results (not separately reported) and communicating results to the patient/family/caregiver, or Care coordination (not separately reported).     Each patient to whom he or she provides medical services by telemedicine is:  (1) informed of the relationship between the physician and patient and the respective role of any other health care provider with respect to management of the patient; and (2) notified that he or she may decline to receive medical services by telemedicine and may withdraw from such care at any time.    PCP Dr Arias    Patient reports she was getting out of the tub and fell forward. She recalls the entire event. She needed her 's assistance getting up. She has had frequent falls. She denies head injury. She reports chronic bilateral ankle swelling and pain to the bottom of her feet that is worse since falling again. She uses a walking cane and a walker for an assistive device. She reports a spinning sensation that happens when she gets up and lasts about 5-10 minutes. She's Dr. Briggs and next appt is in March.    Hypertension  This is a recurrent problem. The current episode started more than 1 year ago. The problem has been gradually worsening since onset. The problem is resistant. Associated symptoms include anxiety, blurred vision, chest  pain ( chronic), headaches, malaise/fatigue, orthopnea, peripheral edema, PND, shortness of breath ( chronic) and sweats. Pertinent negatives include no neck pain or palpitations. There are no associated agents to hypertension. Risk factors for coronary artery disease include dyslipidemia. Past treatments include alpha 1 blockers and beta blockers. The current treatment provides moderate improvement. There are no compliance problems.      Review of Systems   Constitutional: Positive for malaise/fatigue.   Eyes: Positive for blurred vision.   Respiratory: Positive for shortness of breath ( chronic).    Cardiovascular: Positive for chest pain ( chronic), orthopnea and PND. Negative for palpitations.   Musculoskeletal: Negative for neck pain.   Neurological: Positive for headaches.         Objective:        Wt Readings from Last 3 Encounters:   11/05/21 135.2 kg (298 lb 1 oz)   10/18/21 (!) 138.5 kg (305 lb 5.4 oz)   10/18/21 (!) 138.5 kg (305 lb 6.4 oz)     Temp Readings from Last 3 Encounters:   10/18/21 98.1 °F (36.7 °C) (Tympanic)   10/18/21 98.1 °F (36.7 °C)   09/28/21 97.2 °F (36.2 °C) (Temporal)     BP Readings from Last 3 Encounters:   11/05/21 97/73   10/18/21 124/80   10/18/21 (!) 172/91     Pulse Readings from Last 3 Encounters:   11/05/21 80   10/18/21 (!) 58   10/18/21 (!) 58     There is no height or weight on file to calculate BMI.    Physical Exam  Vitals and nursing note reviewed.   Constitutional:       General: She is not in acute distress.     Appearance: She is well-developed.   HENT:      Head: Normocephalic and atraumatic.   Eyes:      General: Lids are normal. No scleral icterus.     Extraocular Movements: Extraocular movements intact.      Conjunctiva/sclera: Conjunctivae normal.   Pulmonary:      Effort: Pulmonary effort is normal.   Neurological:      Mental Status: She is alert.   Psychiatric:         Mood and Affect: Mood and affect normal.         Assessment:       1. Frequent falls    2.  Splenic artery aneurysm    3. Chronic kidney disease, stage 3a    4. Malignant neoplasm of upper-outer quadrant of right breast in female, estrogen receptor positive    5. Morbid obesity with BMI of 45.0-49.9, adult    6. Major depressive disorder, single episode, moderate with anxious distress    7. Chronic diastolic congestive heart failure    8. Dizziness        Plan:     Problem List Items Addressed This Visit        Psychiatric    Major depressive disorder, single episode, moderate with anxious distress    Current Assessment & Plan     Stable on Effexor; Cymbalta            Cardiac/Vascular    Chronic diastolic congestive heart failure    Overview     Followed by Cardiology         Splenic artery aneurysm    Overview     Followed by Heme/Onc            Renal/    Chronic kidney disease, stage 3a    Current Assessment & Plan       Chemistry        Component Value Date/Time     09/28/2021 1345    K 4.5 09/28/2021 1345     09/28/2021 1345    CO2 22 (L) 09/28/2021 1345    BUN 17 09/28/2021 1345    CREATININE 1.3 09/28/2021 1345     09/28/2021 1345        Component Value Date/Time    CALCIUM 10.2 09/28/2021 1345    ALKPHOS 157 (H) 09/28/2021 1345    AST 21 09/28/2021 1345    ALT 26 09/28/2021 1345    BILITOT 0.3 09/28/2021 1345    ESTGFRAFRICA 48 (A) 09/28/2021 1345    EGFRNONAA 42 (A) 09/28/2021 1345                     Oncology    Malignant neoplasm of upper-outer quadrant of right breast in female, estrogen receptor positive (Chronic)    Overview     Followed by hematology/oncology            Endocrine    Morbid obesity with BMI of 45.0-49.9, adult    Current Assessment & Plan     Wt Readings from Last 10 Encounters:   11/05/21 135.2 kg (298 lb 1 oz)   10/18/21 (!) 138.5 kg (305 lb 5.4 oz)   10/18/21 (!) 138.5 kg (305 lb 6.4 oz)   09/28/21 (!) 138.7 kg (305 lb 12.5 oz)   09/23/21 (!) 137.4 kg (302 lb 14.6 oz)   07/30/21 136 kg (299 lb 13.2 oz)   07/12/21 (!) 138.3 kg (304 lb 14.3 oz)    06/28/21 (!) 136.9 kg (301 lb 13 oz)   06/04/21 135.6 kg (299 lb)   04/29/21 134.7 kg (296 lb 15.4 oz)     There is no height or weight on file to calculate BMI.             Other Visit Diagnoses     Frequent falls    -  Primary    Relevant Orders    Ambulatory referral/consult to Physical/Occupational Therapy    Dizziness            Will refer to PT for fall prevention.  Spoke to patient about vertigo, medication side effects. Will hold off on medication at this time and see what Dr. Briggs's assessment entails.

## 2022-02-01 NOTE — ASSESSMENT & PLAN NOTE
Chemistry        Component Value Date/Time     09/28/2021 1345    K 4.5 09/28/2021 1345     09/28/2021 1345    CO2 22 (L) 09/28/2021 1345    BUN 17 09/28/2021 1345    CREATININE 1.3 09/28/2021 1345     09/28/2021 1345        Component Value Date/Time    CALCIUM 10.2 09/28/2021 1345    ALKPHOS 157 (H) 09/28/2021 1345    AST 21 09/28/2021 1345    ALT 26 09/28/2021 1345    BILITOT 0.3 09/28/2021 1345    ESTGFRAFRICA 48 (A) 09/28/2021 1345    EGFRNONAA 42 (A) 09/28/2021 1345

## 2022-02-01 NOTE — PATIENT INSTRUCTIONS
1. Drink plenty of fluids  2. Get plenty of rest.  3. Take Tylenol as directed on package for pain/fever. Do not take more than package suggests to take.   4. Go to Emergency Department if your symptoms worsen.  5. Follow up with your PCP in 1 week if symptoms persist.   Patient Education       Preventing Falls   The Basics   Written by the doctors and editors at [employer]   Am I at risk of falling? -- Your risk of falling increases as you grow older. That's because getting older can make it harder to walk steadily and keep your balance. Also, the effects of falls are more serious in older people.  Overall, 3 to 4 out of every 10 people over the age of 65 fall each year. Up to 75 percent of people who fracture a hip never recover to the point they were before they had their fracture. If you have fallen in the past, you are at higher risk of falling again.  Several things can increase your risk of a fall, including:  · Illness  · A change in the medicines you take  · An unsafe or unfamiliar setting (for example, a room with rugs or furniture that might trip you, or an area you don't know well)  How can my doctor help me to avoid falling? -- Your doctor can talk to you about the following things:  · Past falls - It is important to tell your doctor about any times you have fallen or almost fallen. He or she can then suggest ways to prevent another fall.  · Your health conditions - Some health problems can put you at risk of falling. These include conditions that affect eyesight, hearing, muscle strength, or balance.  · The medicines you take - Certain medicines can increase the risk of falling. These include some medicines that are used for sleeping problems, anxiety, high blood pressure, or depression. Adding new medicines, or changing doses of some medicines, can also affect your risk of falling.  The more your doctor knows about your situation, the better he or she will be able to help you. For example, if you fell  because you have a condition that causes pain, your doctor might suggest treatments to deal with the pain. Or if one of your medicines is making you dizzy and more likely to fall, your doctor might switch you to a different medicine.  Is there anything I can do on my own? -- Yes. To help keep from falling, you can:  · Make your home safer - To avoid falling at home, get rid of things that might make you trip or slip. This might include furniture, electrical cords, clutter, and loose rugs (figure 1). Keep your home well-lit so that you can easily see where you are going. Avoid storing things in high places so you don't have to reach or climb.  · Wear sturdy shoes that fit well - Wearing shoes with high heels or slippery soles, or shoes that are too loose, can lead to falls. Walking around in bare feet, or only socks, can also increase your risk of falling.  · Take vitamin D pills - Taking vitamin D might lower the risk of falls in older people. This is because vitamin D helps make bones and muscles stronger. Your doctor can talk to you about whether you should take extra vitamin D, and how much.  · Stay active - Exercising on a regular basis can help lower your risk of falling. It might also help prevent you from getting hurt if you do fall. It is best to do a few different activities that help with both strength and balance. There are many kinds of exercise that can be safe for older people. These include walking, swimming, and [employer] Chi (a Chinese martial art that involves slow, gentle movements).  · Use a cane, walker, and other safety devices - If your doctor recommends that you use a cane or walker, be sure that it's the right size and you know how to use it. There are other devices that might help you avoid falling, too. These include grab bars or a sturdy seat for the shower, non-slip bath mats, and hand rails or treads for the stairs (to prevent slipping).  If you worry that you could fall, there are also alarm  buttons that let you call for help if you fall and can't get up.  What should I do if I fall? -- If you fall, see your doctor right away, even if you aren't hurt. Your doctor can try to figure out what caused you to fall, and how likely you are to fall again. He or she will do an exam and talk to you about your health problems, medicines, and activities. Then he or she can suggest things you can do to avoid falling again.  Many older people have a hard time recovering after a fall. Doing things to prevent falling can help you to protect your health and independence.  All topics are updated as new evidence becomes available and our peer review process is complete.  This topic retrieved from [employer] on: Sep 21, 2021.  Topic 33152 Version 18.0  Release: 29.4.2 - C29.263  © 2021 [employer] and/or its affiliates. All rights reserved.  figure 1: How to avoid falling at home     This picture shows some of the things that can cause a fall in your home. Look around and remove any loose rugs, electrical cords, clutter, or furniture that could trip you.  Graphic 94726 Version 1.0    Consumer Information Use and Disclaimer   This information is not specific medical advice and does not replace information you receive from your health care provider. This is only a brief summary of general information. It does NOT include all information about conditions, illnesses, injuries, tests, procedures, treatments, therapies, discharge instructions or life-style choices that may apply to you. You must talk with your health care provider for complete information about your health and treatment options. This information should not be used to decide whether or not to accept your health care provider's advice, instructions or recommendations. Only your health care provider has the knowledge and training to provide advice that is right for you. The use of this information is governed by the [employer] End User License Agreement, available at  [email or web address].The use of [employer] content is governed by the [employer] Terms of Use. ©2021 [employer] All rights reserved.  Copyright   © 2021 [employer] and/or its affiliates. All rights reserved.

## 2022-02-14 DIAGNOSIS — Z17.0 MALIGNANT NEOPLASM OF UPPER-OUTER QUADRANT OF RIGHT BREAST IN FEMALE, ESTROGEN RECEPTOR POSITIVE: Chronic | ICD-10-CM

## 2022-02-14 DIAGNOSIS — C50.411 MALIGNANT NEOPLASM OF UPPER-OUTER QUADRANT OF RIGHT BREAST IN FEMALE, ESTROGEN RECEPTOR POSITIVE: Chronic | ICD-10-CM

## 2022-02-14 RX ORDER — OXYCODONE AND ACETAMINOPHEN 7.5; 325 MG/1; MG/1
1 TABLET ORAL EVERY 4 HOURS PRN
Qty: 90 TABLET | Refills: 0 | Status: CANCELLED | OUTPATIENT
Start: 2022-02-14 | End: 2023-02-14

## 2022-02-15 ENCOUNTER — HOSPITAL ENCOUNTER (OUTPATIENT)
Facility: HOSPITAL | Age: 72
Discharge: HOME | End: 2022-02-18
Attending: EMERGENCY MEDICINE | Admitting: STUDENT IN AN ORGANIZED HEALTH CARE EDUCATION/TRAINING PROGRAM
Payer: MEDICARE

## 2022-02-15 ENCOUNTER — OFFICE VISIT (OUTPATIENT)
Dept: PRIMARY CARE CLINIC | Facility: CLINIC | Age: 72
End: 2022-02-15
Payer: MEDICARE

## 2022-02-15 VITALS
HEART RATE: 63 BPM | DIASTOLIC BLOOD PRESSURE: 54 MMHG | OXYGEN SATURATION: 98 % | SYSTOLIC BLOOD PRESSURE: 91 MMHG | WEIGHT: 293 LBS | TEMPERATURE: 98 F | HEIGHT: 69 IN | BODY MASS INDEX: 43.4 KG/M2

## 2022-02-15 DIAGNOSIS — E66.01 MORBID OBESITY WITH BMI OF 45.0-49.9, ADULT: ICD-10-CM

## 2022-02-15 DIAGNOSIS — R07.9 CHEST PAIN, UNSPECIFIED TYPE: Primary | ICD-10-CM

## 2022-02-15 DIAGNOSIS — I26.99 ACUTE PULMONARY EMBOLISM: ICD-10-CM

## 2022-02-15 DIAGNOSIS — I26.99 PULMONARY EMBOLISM: ICD-10-CM

## 2022-02-15 DIAGNOSIS — N17.9 AKI (ACUTE KIDNEY INJURY): Primary | ICD-10-CM

## 2022-02-15 DIAGNOSIS — M79.89 LEG SWELLING: ICD-10-CM

## 2022-02-15 DIAGNOSIS — R55 NEAR SYNCOPE: ICD-10-CM

## 2022-02-15 DIAGNOSIS — R07.89 OTHER CHEST PAIN: ICD-10-CM

## 2022-02-15 PROBLEM — N18.30 ACUTE RENAL FAILURE SUPERIMPOSED ON STAGE 3 CHRONIC KIDNEY DISEASE: Status: ACTIVE | Noted: 2022-02-15

## 2022-02-15 LAB
ALBUMIN SERPL BCP-MCNC: 2.9 G/DL (ref 3.5–5.2)
ALP SERPL-CCNC: 111 U/L (ref 55–135)
ALT SERPL W/O P-5'-P-CCNC: 8 U/L (ref 10–44)
ANION GAP SERPL CALC-SCNC: 8 MMOL/L (ref 8–16)
APTT BLDCRRT: 27.8 SEC (ref 21–32)
AST SERPL-CCNC: 10 U/L (ref 10–40)
BASOPHILS # BLD AUTO: 0.07 K/UL (ref 0–0.2)
BASOPHILS NFR BLD: 0.8 % (ref 0–1.9)
BILIRUB SERPL-MCNC: 0.2 MG/DL (ref 0.1–1)
BNP SERPL-MCNC: 144 PG/ML (ref 0–99)
BUN SERPL-MCNC: 24 MG/DL (ref 8–23)
CALCIUM SERPL-MCNC: 9 MG/DL (ref 8.7–10.5)
CHLORIDE SERPL-SCNC: 107 MMOL/L (ref 95–110)
CO2 SERPL-SCNC: 25 MMOL/L (ref 23–29)
CREAT SERPL-MCNC: 1.7 MG/DL (ref 0.5–1.4)
D DIMER PPP IA.FEU-MCNC: 0.63 MG/L FEU
DIFFERENTIAL METHOD: ABNORMAL
EOSINOPHIL # BLD AUTO: 0.2 K/UL (ref 0–0.5)
EOSINOPHIL NFR BLD: 1.8 % (ref 0–8)
ERYTHROCYTE [DISTWIDTH] IN BLOOD BY AUTOMATED COUNT: 15.6 % (ref 11.5–14.5)
EST. GFR  (AFRICAN AMERICAN): 35 ML/MIN/1.73 M^2
EST. GFR  (NON AFRICAN AMERICAN): 30 ML/MIN/1.73 M^2
GLUCOSE SERPL-MCNC: 110 MG/DL (ref 70–110)
HCT VFR BLD AUTO: 33.5 % (ref 37–48.5)
HGB BLD-MCNC: 10.3 G/DL (ref 12–16)
IMM GRANULOCYTES # BLD AUTO: 0.03 K/UL (ref 0–0.04)
IMM GRANULOCYTES NFR BLD AUTO: 0.3 % (ref 0–0.5)
INR PPP: 0.9 (ref 0.8–1.2)
LYMPHOCYTES # BLD AUTO: 2.1 K/UL (ref 1–4.8)
LYMPHOCYTES NFR BLD: 24.1 % (ref 18–48)
MCH RBC QN AUTO: 24.7 PG (ref 27–31)
MCHC RBC AUTO-ENTMCNC: 30.7 G/DL (ref 32–36)
MCV RBC AUTO: 80 FL (ref 82–98)
MONOCYTES # BLD AUTO: 0.7 K/UL (ref 0.3–1)
MONOCYTES NFR BLD: 7.9 % (ref 4–15)
NEUTROPHILS # BLD AUTO: 5.7 K/UL (ref 1.8–7.7)
NEUTROPHILS NFR BLD: 65.1 % (ref 38–73)
NRBC BLD-RTO: 0 /100 WBC
PLATELET # BLD AUTO: 238 K/UL (ref 150–450)
PMV BLD AUTO: 9.7 FL (ref 9.2–12.9)
POTASSIUM SERPL-SCNC: 4.3 MMOL/L (ref 3.5–5.1)
PROT SERPL-MCNC: 6.8 G/DL (ref 6–8.4)
PROTHROMBIN TIME: 10.6 SEC (ref 9–12.5)
RBC # BLD AUTO: 4.17 M/UL (ref 4–5.4)
SARS-COV-2 RDRP RESP QL NAA+PROBE: NEGATIVE
SODIUM SERPL-SCNC: 140 MMOL/L (ref 136–145)
TROPONIN I SERPL DL<=0.01 NG/ML-MCNC: 0.01 NG/ML (ref 0–0.03)
WBC # BLD AUTO: 8.75 K/UL (ref 3.9–12.7)

## 2022-02-15 PROCEDURE — 85730 THROMBOPLASTIN TIME PARTIAL: CPT | Performed by: EMERGENCY MEDICINE

## 2022-02-15 PROCEDURE — 83880 ASSAY OF NATRIURETIC PEPTIDE: CPT | Performed by: EMERGENCY MEDICINE

## 2022-02-15 PROCEDURE — U0002 COVID-19 LAB TEST NON-CDC: HCPCS | Performed by: EMERGENCY MEDICINE

## 2022-02-15 PROCEDURE — 99215 OFFICE O/P EST HI 40 MIN: CPT | Mod: PBBFAC,27,25 | Performed by: NURSE PRACTITIONER

## 2022-02-15 PROCEDURE — 85025 COMPLETE CBC W/AUTO DIFF WBC: CPT | Performed by: EMERGENCY MEDICINE

## 2022-02-15 PROCEDURE — 96361 HYDRATE IV INFUSION ADD-ON: CPT | Mod: 59

## 2022-02-15 PROCEDURE — G0378 HOSPITAL OBSERVATION PER HR: HCPCS

## 2022-02-15 PROCEDURE — 25000003 PHARM REV CODE 250: Performed by: INTERNAL MEDICINE

## 2022-02-15 PROCEDURE — 99291 CRITICAL CARE FIRST HOUR: CPT | Mod: 25

## 2022-02-15 PROCEDURE — 96366 THER/PROPH/DIAG IV INF ADDON: CPT

## 2022-02-15 PROCEDURE — 85610 PROTHROMBIN TIME: CPT | Performed by: EMERGENCY MEDICINE

## 2022-02-15 PROCEDURE — 80053 COMPREHEN METABOLIC PANEL: CPT | Performed by: EMERGENCY MEDICINE

## 2022-02-15 PROCEDURE — 63600175 PHARM REV CODE 636 W HCPCS: Performed by: EMERGENCY MEDICINE

## 2022-02-15 PROCEDURE — 99999 PR PBB SHADOW E&M-EST. PATIENT-LVL V: CPT | Mod: PBBFAC,,, | Performed by: NURSE PRACTITIONER

## 2022-02-15 PROCEDURE — 93005 ELECTROCARDIOGRAM TRACING: CPT

## 2022-02-15 PROCEDURE — 96375 TX/PRO/DX INJ NEW DRUG ADDON: CPT

## 2022-02-15 PROCEDURE — 99214 PR OFFICE/OUTPT VISIT, EST, LEVL IV, 30-39 MIN: ICD-10-PCS | Mod: S$GLB,,, | Performed by: NURSE PRACTITIONER

## 2022-02-15 PROCEDURE — 99999 PR PBB SHADOW E&M-EST. PATIENT-LVL V: ICD-10-PCS | Mod: PBBFAC,,, | Performed by: NURSE PRACTITIONER

## 2022-02-15 PROCEDURE — 85379 FIBRIN DEGRADATION QUANT: CPT | Performed by: EMERGENCY MEDICINE

## 2022-02-15 PROCEDURE — 25000003 PHARM REV CODE 250: Performed by: EMERGENCY MEDICINE

## 2022-02-15 PROCEDURE — 84484 ASSAY OF TROPONIN QUANT: CPT | Performed by: EMERGENCY MEDICINE

## 2022-02-15 PROCEDURE — 99214 OFFICE O/P EST MOD 30 MIN: CPT | Mod: S$GLB,,, | Performed by: NURSE PRACTITIONER

## 2022-02-15 PROCEDURE — 96365 THER/PROPH/DIAG IV INF INIT: CPT

## 2022-02-15 PROCEDURE — 93010 ELECTROCARDIOGRAM REPORT: CPT | Mod: ,,, | Performed by: INTERNAL MEDICINE

## 2022-02-15 PROCEDURE — 93010 EKG 12-LEAD: ICD-10-PCS | Mod: ,,, | Performed by: INTERNAL MEDICINE

## 2022-02-15 RX ORDER — METOCLOPRAMIDE HYDROCHLORIDE 5 MG/ML
10 INJECTION INTRAMUSCULAR; INTRAVENOUS
Status: COMPLETED | OUTPATIENT
Start: 2022-02-15 | End: 2022-02-15

## 2022-02-15 RX ORDER — MAG HYDROX/ALUMINUM HYD/SIMETH 200-200-20
30 SUSPENSION, ORAL (FINAL DOSE FORM) ORAL EVERY 6 HOURS PRN
Status: DISCONTINUED | OUTPATIENT
Start: 2022-02-15 | End: 2022-02-18 | Stop reason: HOSPADM

## 2022-02-15 RX ORDER — ASPIRIN 81 MG/1
81 TABLET ORAL DAILY
Status: DISCONTINUED | OUTPATIENT
Start: 2022-02-16 | End: 2022-02-18 | Stop reason: HOSPADM

## 2022-02-15 RX ORDER — ONDANSETRON 2 MG/ML
4 INJECTION INTRAMUSCULAR; INTRAVENOUS EVERY 8 HOURS PRN
Status: DISCONTINUED | OUTPATIENT
Start: 2022-02-15 | End: 2022-02-18 | Stop reason: HOSPADM

## 2022-02-15 RX ORDER — VENLAFAXINE HYDROCHLORIDE 75 MG/1
75 CAPSULE, EXTENDED RELEASE ORAL DAILY
Status: DISCONTINUED | OUTPATIENT
Start: 2022-02-16 | End: 2022-02-18 | Stop reason: HOSPADM

## 2022-02-15 RX ORDER — AMLODIPINE BESYLATE 10 MG/1
10 TABLET ORAL DAILY
Status: DISCONTINUED | OUTPATIENT
Start: 2022-02-16 | End: 2022-02-18 | Stop reason: HOSPADM

## 2022-02-15 RX ORDER — ALPRAZOLAM 0.5 MG/1
0.5 TABLET ORAL 2 TIMES DAILY PRN
Status: DISCONTINUED | OUTPATIENT
Start: 2022-02-15 | End: 2022-02-18 | Stop reason: HOSPADM

## 2022-02-15 RX ORDER — HYDRALAZINE HYDROCHLORIDE 25 MG/1
100 TABLET, FILM COATED ORAL EVERY 8 HOURS
Status: DISCONTINUED | OUTPATIENT
Start: 2022-02-15 | End: 2022-02-18 | Stop reason: HOSPADM

## 2022-02-15 RX ORDER — KETOROLAC TROMETHAMINE 30 MG/ML
15 INJECTION, SOLUTION INTRAMUSCULAR; INTRAVENOUS
Status: COMPLETED | OUTPATIENT
Start: 2022-02-15 | End: 2022-02-15

## 2022-02-15 RX ORDER — ATORVASTATIN CALCIUM 40 MG/1
40 TABLET, FILM COATED ORAL DAILY
Status: DISCONTINUED | OUTPATIENT
Start: 2022-02-16 | End: 2022-02-18 | Stop reason: HOSPADM

## 2022-02-15 RX ORDER — ACETAMINOPHEN 325 MG/1
650 TABLET ORAL EVERY 6 HOURS PRN
Status: DISCONTINUED | OUTPATIENT
Start: 2022-02-15 | End: 2022-02-18 | Stop reason: HOSPADM

## 2022-02-15 RX ORDER — DOXAZOSIN 2 MG/1
4 TABLET ORAL NIGHTLY
Status: DISCONTINUED | OUTPATIENT
Start: 2022-02-15 | End: 2022-02-18 | Stop reason: HOSPADM

## 2022-02-15 RX ORDER — ALBUTEROL SULFATE 0.83 MG/ML
2.5 SOLUTION RESPIRATORY (INHALATION) EVERY 4 HOURS PRN
Status: DISCONTINUED | OUTPATIENT
Start: 2022-02-15 | End: 2022-02-18 | Stop reason: HOSPADM

## 2022-02-15 RX ORDER — DIPHENHYDRAMINE HYDROCHLORIDE 50 MG/ML
25 INJECTION INTRAMUSCULAR; INTRAVENOUS
Status: COMPLETED | OUTPATIENT
Start: 2022-02-15 | End: 2022-02-15

## 2022-02-15 RX ORDER — SODIUM CHLORIDE 9 MG/ML
INJECTION, SOLUTION INTRAVENOUS CONTINUOUS
Status: ACTIVE | OUTPATIENT
Start: 2022-02-15 | End: 2022-02-16

## 2022-02-15 RX ORDER — ASPIRIN 81 MG/1
81 TABLET ORAL DAILY
COMMUNITY
End: 2022-04-18 | Stop reason: ALTCHOICE

## 2022-02-15 RX ORDER — HEPARIN SODIUM,PORCINE/D5W 25000/250
0-40 INTRAVENOUS SOLUTION INTRAVENOUS CONTINUOUS
Status: DISCONTINUED | OUTPATIENT
Start: 2022-02-15 | End: 2022-02-17

## 2022-02-15 RX ORDER — GUAIFENESIN 100 MG/5ML
200 SOLUTION ORAL EVERY 4 HOURS PRN
Status: DISCONTINUED | OUTPATIENT
Start: 2022-02-15 | End: 2022-02-18 | Stop reason: HOSPADM

## 2022-02-15 RX ORDER — LETROZOLE 2.5 MG/1
2.5 TABLET, FILM COATED ORAL DAILY
Status: DISCONTINUED | OUTPATIENT
Start: 2022-02-16 | End: 2022-02-18 | Stop reason: HOSPADM

## 2022-02-15 RX ORDER — ZOLPIDEM TARTRATE 5 MG/1
5 TABLET ORAL NIGHTLY PRN
Status: DISCONTINUED | OUTPATIENT
Start: 2022-02-15 | End: 2022-02-18 | Stop reason: HOSPADM

## 2022-02-15 RX ORDER — ACETAMINOPHEN 500 MG
1000 TABLET ORAL
Status: COMPLETED | OUTPATIENT
Start: 2022-02-15 | End: 2022-02-15

## 2022-02-15 RX ORDER — HYDRALAZINE HYDROCHLORIDE 20 MG/ML
10 INJECTION INTRAMUSCULAR; INTRAVENOUS EVERY 8 HOURS PRN
Status: DISCONTINUED | OUTPATIENT
Start: 2022-02-15 | End: 2022-02-18 | Stop reason: HOSPADM

## 2022-02-15 RX ADMIN — HYDRALAZINE HYDROCHLORIDE 100 MG: 25 TABLET, FILM COATED ORAL at 10:02

## 2022-02-15 RX ADMIN — DIPHENHYDRAMINE HYDROCHLORIDE 25 MG: 50 INJECTION INTRAMUSCULAR; INTRAVENOUS at 01:02

## 2022-02-15 RX ADMIN — ACETAMINOPHEN 1000 MG: 500 TABLET ORAL at 01:02

## 2022-02-15 RX ADMIN — HEPARIN SODIUM 18 UNITS/KG/HR: 10000 INJECTION, SOLUTION INTRAVENOUS at 09:02

## 2022-02-15 RX ADMIN — METOCLOPRAMIDE 10 MG: 5 INJECTION, SOLUTION INTRAMUSCULAR; INTRAVENOUS at 01:02

## 2022-02-15 RX ADMIN — KETOROLAC TROMETHAMINE 15 MG: 30 INJECTION, SOLUTION INTRAMUSCULAR at 01:02

## 2022-02-15 RX ADMIN — SODIUM CHLORIDE 1000 ML: 0.9 INJECTION, SOLUTION INTRAVENOUS at 12:02

## 2022-02-15 RX ADMIN — SODIUM CHLORIDE: 0.9 INJECTION, SOLUTION INTRAVENOUS at 10:02

## 2022-02-15 RX ADMIN — DOXAZOSIN 4 MG: 2 TABLET ORAL at 10:02

## 2022-02-15 NOTE — ED PROVIDER NOTES
SCRIBE #1 NOTE: I, Hailey Saez, am scribing for, and in the presence of, Daniele Zamora MD. I have scribed the HPI, ROS, and PEx.     SCRIBE #2 NOTE: I, Cornelius Fleming, am scribing for, and in the presence of,  Gregorio Muhammad MD. I have scribed the remaining portions of the note not scribed by Scribe #1.     History      Chief Complaint   Patient presents with    Hypotension     Sent from pcp for hypotension and chest pressure that began today, pt reports feeling fatigued yesterday       Review of patient's allergies indicates:   Allergen Reactions    Pcn [penicillins] Hives        HPI   HPI    2/15/2022, 12:51 PM   History obtained from the patient      History of Present Illness: Susu Luther is a 72 y.o. female patient with a PMHx of chronic diastolic congestive heart failure, AAA, PE, history of repair of aneurysm of abdominal aorta using endovascular stent graft, and stroke who presents to the Emergency Department after being sent by Nirmala Ayala NP for hypotension, SOB, and chest pressure. Yesterday, the pt reports that her blood pressure was 170/110, but this morning at her PCP's office, they were not able to get a blood pressure reading, so the pt was referred to the ER. She reports being compliant with her blood pressure regimen of 3, 0.1 mg clonidine every morning, 1, 0.1 mg clonidine every afternoon, and 0.1 mg clonidine every night. For 1 month, the pt reports that she has been experiencing dizziness, chest pressure, SOB, BLE pain, and generalized weakness. She is also experiencing fatigue which onset yesterday and headache. Symptoms are constant and moderate in severity. No mitigating or exacerbating factors reported. Patient denies any fever, chills, blood in stool, N/V/D, melena, numbness, and all other sxs at this time. No prior Tx reported. She denies being on blood thinners, but reports that she is on a diuretic. No further complaints or concerns at this time.         Arrival mode:  EMS    PCP: Peace Arias MD       Past Medical History:  Past Medical History:   Diagnosis Date    Chronic diastolic congestive heart failure 2020    Encounter for blood transfusion     History of repair of aneurysm of abdominal aorta using endovascular stent graft     DR [employer]s    Hx of psychiatric care     Hypertension     Major depressive disorder, single episode, moderate with anxious distress 2020    Malignant neoplasm of upper-outer quadrant of right breast in female, estrogen receptor positive 3/14/2019    radiation    Psychiatric problem     Sleep apnea     Sleep difficulties     Stroke 2009    no residual defect    Therapy        Past Surgical History:  Past Surgical History:   Procedure Laterality Date    APPENDECTOMY      AXILLARY NODE DISSECTION Right 2020    Procedure: LYMPHADENECTOMY, AXILLARY;  Surgeon: Artemio Starks MD;  Location: [facility];  Service: General;  Laterality: Right;    BIOPSY OF AXILLARY NODE Right 3/2/2021    Procedure: BIOPSY, LYMPH NODE, AXILLARY;  Surgeon: Artemio Sutton MD;  Location: [address];  Service: General;  Laterality: Right;    BREAST BIOPSY      2019    BREAST LUMPECTOMY      2019     SECTION      x 1    OOPHORECTOMY      right     SENTINEL LYMPH NODE BIOPSY Right 3/27/2019    Procedure: BIOPSY, LYMPH NODE, SENTINEL;  Surgeon: Artemio Starks MD;  Location: [facility];  Service: General;  Laterality: Right;         Family History:  Family History   Problem Relation Age of Onset    Diabetes Maternal Grandmother     Diabetes Maternal Grandfather     Diabetes Mother     Sickle cell anemia Daughter     Breast cancer Neg Hx     Colon cancer Neg Hx     Ovarian cancer Neg Hx        Social History:  Social History     Tobacco Use    Smoking status: Never Smoker    Smokeless tobacco: Never Used   Substance and Sexual Activity    Alcohol use: No    Drug use: No    Sexual activity: Yes     Partners: Male     Birth  control/protection: Post-menopausal       ROS   Review of Systems   Constitutional: Positive for fatigue. Negative for chills and fever.   HENT: Negative for sore throat.    Respiratory: Positive for shortness of breath.    Cardiovascular: Negative for chest pain.        (+) chest pressure   Gastrointestinal: Negative for blood in stool, diarrhea, nausea and vomiting.        (-) melena   Genitourinary: Negative for dysuria.   Musculoskeletal: Positive for myalgias (BLE). Negative for back pain.   Skin: Negative for rash.   Neurological: Positive for dizziness, weakness (generized) and headaches. Negative for numbness.   Hematological: Does not bruise/bleed easily.   All other systems reviewed and are negative.    Physical Exam      Initial Vitals [02/15/22 1158]   BP Pulse Resp Temp SpO2   (!) 106/56 (!) 53 20 98.7 °F (37.1 °C) 100 %      MAP       --          Physical Exam  Nursing Notes and Vital Signs Reviewed.  Constitutional: Patient is in no acute distress. Obese.  Head: Atraumatic. Normocephalic.  Eyes: PERRL. EOM intact. Conjunctivae are not pale. No scleral icterus.  ENT: Mucous membranes are moist. Oropharynx is clear and symmetric.    Neck: Supple. Full ROM. No lymphadenopathy.  Cardiovascular: Regular rate. Regular rhythm. No murmurs, rubs, or gallops. Distal pulses are 2+ and symmetric.  Pulmonary/Chest: No respiratory distress. Clear to auscultation bilaterally. No wheezing or rales.  Abdominal: Soft and non-distended.  There is no tenderness.  No rebound, guarding, or rigidity.   Musculoskeletal: Moves all extremities. No obvious deformities. Trace pitting edema to the bilateral lower extremities.  Skin: Warm and dry.  Neurological:  Alert, awake, and appropriate.  Normal speech.  No acute focal neurological deficits are appreciated.  Psychiatric: Normal affect. Good eye contact. Appropriate in content.    ED Course    Critical Care    Date/Time: 2/15/2022 9:10 PM  Performed by: Gregorio Muhammad  "MD  Authorized by: Gregorio Muhammad MD   Direct patient critical care time: 15 minutes  Additional history critical care time: 5 minutes  Ordering / reviewing critical care time: 5 minutes  Documentation critical care time: 5 minutes  Total critical care time (exclusive of procedural time) : 30 minutes  Critical care time was exclusive of teaching time and separately billable procedures and treating other patients.  Critical care was necessary to treat or prevent imminent or life-threatening deterioration of the following conditions: PE treated with IV heparin.  Critical care was time spent personally by me on the following activities: blood draw for specimens, development of treatment plan with patient or surrogate, discussions with consultants, interpretation of cardiac output measurements, evaluation of patient's response to treatment, examination of patient, obtaining history from patient or surrogate, ordering and performing treatments and interventions, ordering and review of radiographic studies, ordering and review of laboratory studies, pulse oximetry, re-evaluation of patient's condition and review of old charts.        ED Vital Signs:  Vitals:    02/15/22 1158 02/15/22 1200 02/15/22 1220 02/15/22 1222   BP: (!) 106/56  113/63 111/61   Pulse: (!) 53 (!) 55 (!) 53 (!) 54   Resp: 20      Temp: 98.7 °F (37.1 °C)      TempSrc: Oral      SpO2: 100%      Weight: 135.2 kg (298 lb 1 oz)      Height: 5' 9.5" (1.765 m)       02/15/22 1230 02/15/22 1330 02/15/22 1430 02/15/22 1500   BP: (!) 105/57 (!) 140/72 110/61 102/69   Pulse: (!) 56 62 (!) 58 (!) 57   Resp: 20 20 20 20   Temp:       TempSrc:       SpO2: 100% 100% 100% 100%   Weight:       Height:        02/15/22 1730 02/15/22 1800 02/15/22 1830 02/15/22 1900   BP: (!) 149/77 139/77 139/80 (!) 145/79   Pulse: (!) 55 (!) 52 (!) 55 (!) 53   Resp: 20 16 16 17   Temp:       TempSrc:       SpO2: 100% 100% 100% 100%   Weight:       Height:        02/15/22 1930 02/15/22 " 1950   BP: (!) 153/94    Pulse: (!) 53 (!) 54   Resp: 17    Temp:     TempSrc:     SpO2: 100%    Weight:     Height:         Abnormal Lab Results:  Labs Reviewed   CBC W/ AUTO DIFFERENTIAL - Abnormal; Notable for the following components:       Result Value    Hemoglobin 10.3 (*)     Hematocrit 33.5 (*)     MCV 80 (*)     MCH 24.7 (*)     MCHC 30.7 (*)     RDW 15.6 (*)     All other components within normal limits   COMPREHENSIVE METABOLIC PANEL - Abnormal; Notable for the following components:    BUN 24 (*)     Creatinine 1.7 (*)     Albumin 2.9 (*)     ALT 8 (*)     eGFR if  35 (*)     eGFR if non  30 (*)     All other components within normal limits   B-TYPE NATRIURETIC PEPTIDE - Abnormal; Notable for the following components:     (*)     All other components within normal limits   D DIMER, QUANTITATIVE - Abnormal; Notable for the following components:    D-Dimer 0.63 (*)     All other components within normal limits   TROPONIN I   APTT   PROTIME-INR   SARS-COV-2 RNA AMPLIFICATION, QUAL   SARS-COV-2 RDRP GENE        All Lab Results:  Results for orders placed or performed during the hospital encounter of 02/15/22   CBC Auto Differential   Result Value Ref Range    WBC 8.75 3.90 - 12.70 K/uL    RBC 4.17 4.00 - 5.40 M/uL    Hemoglobin 10.3 (L) 12.0 - 16.0 g/dL    Hematocrit 33.5 (L) 37.0 - 48.5 %    MCV 80 (L) 82 - 98 fL    MCH 24.7 (L) 27.0 - 31.0 pg    MCHC 30.7 (L) 32.0 - 36.0 g/dL    RDW 15.6 (H) 11.5 - 14.5 %    Platelets 238 150 - 450 K/uL    MPV 9.7 9.2 - 12.9 fL    Immature Granulocytes 0.3 0.0 - 0.5 %    Gran # (ANC) 5.7 1.8 - 7.7 K/uL    Immature Grans (Abs) 0.03 0.00 - 0.04 K/uL    Lymph # 2.1 1.0 - 4.8 K/uL    Mono # 0.7 0.3 - 1.0 K/uL    Eos # 0.2 0.0 - 0.5 K/uL    Baso # 0.07 0.00 - 0.20 K/uL    nRBC 0 0 /100 WBC    Gran % 65.1 38.0 - 73.0 %    Lymph % 24.1 18.0 - 48.0 %    Mono % 7.9 4.0 - 15.0 %    Eosinophil % 1.8 0.0 - 8.0 %    Basophil % 0.8 0.0 - 1.9 %     Differential Method Automated    Comprehensive Metabolic Panel   Result Value Ref Range    Sodium 140 136 - 145 mmol/L    Potassium 4.3 3.5 - 5.1 mmol/L    Chloride 107 95 - 110 mmol/L    CO2 25 23 - 29 mmol/L    Glucose 110 70 - 110 mg/dL    BUN 24 (H) 8 - 23 mg/dL    Creatinine 1.7 (H) 0.5 - 1.4 mg/dL    Calcium 9.0 8.7 - 10.5 mg/dL    Total Protein 6.8 6.0 - 8.4 g/dL    Albumin 2.9 (L) 3.5 - 5.2 g/dL    Total Bilirubin 0.2 0.1 - 1.0 mg/dL    Alkaline Phosphatase 111 55 - 135 U/L    AST 10 10 - 40 U/L    ALT 8 (L) 10 - 44 U/L    Anion Gap 8 8 - 16 mmol/L    eGFR if African American 35 (A) >60 mL/min/1.73 m^2    eGFR if non African American 30 (A) >60 mL/min/1.73 m^2   Troponin I   Result Value Ref Range    Troponin I 0.008 0.000 - 0.026 ng/mL   BNP   Result Value Ref Range     (H) 0 - 99 pg/mL   D-Dimer, Quantitative   Result Value Ref Range    D-Dimer 0.63 (H) <0.50 mg/L FEU   APTT   Result Value Ref Range    aPTT 27.8 21.0 - 32.0 sec   Protime-INR   Result Value Ref Range    Prothrombin Time 10.6 9.0 - 12.5 sec    INR 0.9 0.8 - 1.2   COVID-19 Rapid Screening   Result Value Ref Range    SARS-CoV-2 RNA, Amplification, Qual Negative Negative     *Note: Due to a large number of results and/or encounters for the requested time period, some results have not been displayed. A complete set of results can be found in Results Review.         Imaging Results:  Imaging Results          NM Lung Scan Ventilation Perfusion (Final result)  Result time 02/15/22 16:33:46    Final result by Sander Gregory MD (02/15/22 16:33:46)                 Impression:      1.  Indeterminate V/Q scan.  There is a large matched defect within the inferior right lung that does not appear to correspond to an abnormality on the chest x-ray.  No other perfusion defects noted.      Electronically signed by: Sander Gregory MD  Date:    02/15/2022  Time:    16:33             Narrative:    EXAMINATION:  NM LUNG VENTILATION AND PERFUSION  IMAGING    CLINICAL HISTORY:  chest pain c hx PE;    TECHNIQUE:  The patient received 1 mCi of technetium 99 DTPA for ventilation images and 6.2 mCi of technetium 99 MAA for perfusion images.    COMPARISON:  Chest x-ray performed earlier the same day    FINDINGS:  The ventilation images demonstrate mainly central deposition of the radiopharmaceutical.  There is some decrease uptake in the inferior right lung as compared to the rest of the lungs.    Perfusion images demonstrate similar decreased uptake within the inferior right lung, matched with the ventilation defect.  The distribution of radiopharmaceutical is otherwise normal.                               US Lower Extremity Veins Bilateral (Final result)  Result time 02/15/22 13:49:36    Final result by Sander Gregory MD (02/15/22 13:49:36)                 Impression:      1.  No evidence of right or left deep venous thrombosis.      Electronically signed by: Sander Gregory MD  Date:    02/15/2022  Time:    13:49             Narrative:    EXAMINATION:  US LOWER EXTREMITY VEINS BILATERAL    CLINICAL HISTORY:  Other specified soft tissue disorders    TECHNIQUE:  Real-time, color, and duplex evaluation of the deep venous vessels in both lower extremities were performed.    COMPARISON:  No comparison studies are available.    FINDINGS:  No evidence of deep venous thrombosis in either lower extremity. The deep venous vessels demonstrate normal spontaneous and augmented flow with normal respiratory variation.  Deep venous vessels compress in a normal fashion throughout.                               X-Ray Chest AP Portable (Final result)  Result time 02/15/22 12:33:52    Final result by Sander Gregory MD (02/15/22 12:33:52)                 Impression:      1.  Negative for acute process involving the chest, considering there are markedly low lung volumes on today's study.    2.  Stable and incidental findings as noted above.      Electronically signed by: Sander  MD Colt  Date:    02/15/2022  Time:    12:33             Narrative:    EXAMINATION:  XR CHEST AP PORTABLE    CLINICAL HISTORY:  chest pain;    COMPARISON:  [facility], 2019    FINDINGS:  EKG leads overlie the chest.  Moderately low lung volumes on the current study.  The lungs are otherwise clear.  The cardiac silhouette size is enlarged.  The trachea is midline and the mediastinal width is normal. Negative for focal infiltrate, effusion or pneumothorax. Pulmonary vasculature is normal. Negative for osseous abnormalities. Tortuous aorta.  Degenerative changes of the spine and both shoulder girdles.    Interval clips placed in the right axillary region.  There appears to be dense contrast within the left upper quadrant bowel.                               The EKG was ordered, reviewed, and independently interpreted by the ED provider.  Interpretation time: 12:10  Rate: 55 BPM  Rhythm: sinus bradycardia  Interpretation: Nonspecific ST and T wave abnormalities. No STEMI.           The Emergency Provider reviewed the vital signs and test results, which are outlined above.    ED Discussion   12:55 PM: Pt is complaining of a headache, and medication will be ordered now.    4:00 PM: Dr. Zamora transfers care of patient to Dr. Muhammad pending radiology results.    8:33 PM: Discussed pt's case with Dr. Watson (Hospital Medicine) who recommends administering heparin.    8:40 PM: Discussed case with Leticia Yun NP ([facility]). Dr. Watson agrees with current care and management of pt and accepts admission.   Admitting Service: Hospital Medicine  Admitting Physician: Dr. Watson  Admit to: Obs Tele    8:43 PM: Re-evaluated pt. I have discussed test results, shared treatment plan, and the need for admission with patient and family at bedside. Pt and family express understanding at this time and agree with all information. All questions answered. Pt and family have no further questions or concerns at this time. Pt is ready  for admit.             ED Medication(s):  Medications   heparin 25,000 units in dextrose 5% (100 units/ml) IV bolus from bag INITIAL BOLUS (has no administration in time range)   heparin 25,000 units in dextrose 5% 250 mL (100 units/mL) infusion HIGH INTENSITY nomogram - [facility] (has no administration in time range)   heparin 25,000 units in dextrose 5% (100 units/ml) IV bolus from bag - ADDITIONAL PRN BOLUS - 60 units/kg (has no administration in time range)   heparin 25,000 units in dextrose 5% (100 units/ml) IV bolus from bag - ADDITIONAL PRN BOLUS - 30 units/kg (has no administration in time range)   sodium chloride 0.9% bolus 1,000 mL (0 mLs Intravenous Stopped 2/15/22 1400)   ketorolac injection 15 mg (15 mg Intravenous Given 2/15/22 1309)   metoclopramide HCl injection 10 mg (10 mg Intravenous Given 2/15/22 1309)   diphenhydrAMINE injection 25 mg (25 mg Intravenous Given 2/15/22 1309)   acetaminophen tablet 1,000 mg (1,000 mg Oral Given 2/15/22 1308)     New Prescriptions    No medications on file             Medical Decision Making    Medical Decision Making:   Clinical Tests:   Lab Tests: Ordered and Reviewed  Radiological Study: Ordered and Reviewed  Medical Tests: Ordered and Reviewed           Scribe Attestation:   Scribe #1: I performed the above scribed service and the documentation accurately describes the services I performed. I attest to the accuracy of the note.    Attending:   Physician Attestation Statement for Scribe #1: I, Daniele Zamora MD, personally performed the services described in this documentation, as scribed by Hailey Saez, in my presence, and it is both accurate and complete.       Scribe Attestation:   Scribe #2: I performed the above scribed service and the documentation accurately describes the services I performed. I attest to the accuracy of the note.    Attending Attestation:           Physician Attestation for Scribe:    Physician Attestation Statement for Scribe #2: Gregorio MENDOZA  MD Sabina, reviewed documentation, as scribed by Cornelius Fleming in my presence, and it is both accurate and complete. I also acknowledge and confirm the content of the note done by Scribe #1.          Clinical Impression       ICD-10-CM ICD-9-CM   1. TANNER (acute kidney injury)  N17.9 584.9   2. Near syncope  R55 780.2   3. Leg swelling  M79.89 729.81   4. Pulmonary embolism  I26.99 415.19       Disposition:   Disposition: Placed in Observation  Condition: Fair         Gregorio Muhammad MD  02/15/22 [id number]

## 2022-02-15 NOTE — PROGRESS NOTES
"Susu Luther  02/15/2022  [id number]    Peace Arias MD  Patient Care Team:  Peace Arias MD as PCP - General (Internal Medicine)  Wilda Lopez LMSW as  (Hematology and Oncology)  Faizan Arreguin II, MD (Inactive) as Consulting Physician (Radiation Oncology)  Guido Huynh MD (Inactive) as Consulting Physician (Hematology and Oncology)  Artemio Starks MD as Consulting Physician (General Surgery)  Elana Couch LPN as Care Coordinator  Janet Jain NP as Nurse Practitioner (Obstetrics and Gynecology)  Jerica Shelby as Digital Medicine Health   Giovanna SandovalD as Hypertension Digital Medicine Clinician (Pharmacist)  Jarad Gonzalez MD as Hypertension Digital Medicine Responsible Provider (Cardiology)  Tomasz Cage MD as Consulting Physician (Internal Medicine)  [employer] as Hypertension Digital Medicine Contract        [facility]      Chief Complaint:  Chief Complaint   Patient presents with    blood pressure    Dizziness    Headache       History of Present Illness:  HPI    Chest pressure, shortness of breath. Onset 2 weeks ago. 9.5/10.  Worse with deep inspiration, respiration. Very anxious, restless today.     Some leg pain, swelling. Wears compression socks. Had DVT, remote. CVA in 2009. No longer on anticoagulants. Stopped at time of aneurysm repair last year.    H/O negative stress test 1-2 years ago. Was to have follow up work up but did not complete due to breast problems.     Restless. Feels like this "all the time" x 2 weeks. Relates to chest pressure.     H/O PE, DVT. Off of anticoagulant since last year.    Soreness to right axilla. Some redness to right breast area/axilla.     Feels like vision is blurred. Has been following with opt but unable to treat d/t HTN.                 Review of Systems   Constitutional: Positive for malaise/fatigue. Negative for chills and fever.   Respiratory: Positive for shortness of " breath. Negative for cough and wheezing.    Cardiovascular: Positive for chest pain, palpitations and leg swelling.   Gastrointestinal: Negative for heartburn and nausea.   Genitourinary: Negative for dysuria.   Musculoskeletal: Negative for myalgias.   Neurological: Positive for dizziness and headaches.   Psychiatric/Behavioral: The patient is nervous/anxious.          The following were reviewed: Active problem list, medication list, allergies, family history, social history, and Health Maintenance.     History:  Past Medical History:   Diagnosis Date    Chronic diastolic congestive heart failure 2020    Encounter for blood transfusion     History of repair of aneurysm of abdominal aorta using endovascular stent graft     DR Bonds     of psychiatric care     Hypertension     Major depressive disorder, single episode, moderate with anxious distress 2020    Malignant neoplasm of upper-outer quadrant of right breast in female, estrogen receptor positive 3/14/2019    radiation    Psychiatric problem     Sleep apnea     Sleep difficulties     Stroke     no residual defect    Therapy      Past Surgical History:   Procedure Laterality Date    APPENDECTOMY      AXILLARY NODE DISSECTION Right 2020    Procedure: LYMPHADENECTOMY, AXILLARY;  Surgeon: Artemio Starks MD;  Location: [facility];  Service: General;  Laterality: Right;    BIOPSY OF AXILLARY NODE Right 3/2/2021    Procedure: BIOPSY, LYMPH NODE, AXILLARY;  Surgeon: Artemio Sutton MD;  Location: [address];  Service: General;  Laterality: Right;    BREAST BIOPSY      2019    BREAST LUMPECTOMY      2019     SECTION      x 1    OOPHORECTOMY      right     SENTINEL LYMPH NODE BIOPSY Right 3/27/2019    Procedure: BIOPSY, LYMPH NODE, SENTINEL;  Surgeon: Artemio Starks MD;  Location: [facility];  Service: General;  Laterality: Right;     Family History   Problem Relation Age of Onset    Diabetes Maternal  Grandmother     Diabetes Maternal Grandfather     Diabetes Mother     Sickle cell anemia Daughter     Breast cancer Neg Hx     Colon cancer Neg Hx     Ovarian cancer Neg Hx      Social History     Socioeconomic History    Marital status:      Spouse name: Campos Luther    Number of children: 2   Tobacco Use    Smoking status: Never Smoker    Smokeless tobacco: Never Used   Substance and Sexual Activity    Alcohol use: No    Drug use: No    Sexual activity: Yes     Partners: Male     Birth control/protection: Post-menopausal     Patient Active Problem List   Diagnosis    Shortness of breath on exertion    NILS (obstructive sleep apnea)    Malignant neoplasm of upper-outer quadrant of right breast in female, estrogen receptor positive    Hypertension    Stroke    Morbid obesity with BMI of 45.0-49.9, adult    Headache    Circadian rhythm sleep disorder    Nocturnal hypoxemia    Chronic pain of breast    Major depressive disorder, single episode, moderate with anxious distress    Speech disturbance    Blurry vision    Psychological factors affecting medical condition    SOB (shortness of breath)    Chronic diastolic congestive heart failure    Lymphadenopathy    Porcelain gallbladder    Axillary mass    Splenic artery aneurysm    Chronic kidney disease, stage 3a    Other chest pain     Review of patient's allergies indicates:   Allergen Reactions    Pcn [penicillins] Rash       Medications:  Current Outpatient Medications on File Prior to Visit   Medication Sig Dispense Refill    albuterol (VENTOLIN HFA) 90 mcg/actuation inhaler Inhale 2 puffs into the lungs every 4 (four) hours as needed for Shortness of Breath. 18 g 11    ALPRAZolam (XANAX) 0.5 MG tablet Take 1 tablet (0.5 mg total) by mouth 2 (two) times daily as needed for Insomnia or Anxiety. 60 tablet 0    ALPRAZolam (XANAX) 0.5 MG tablet Take 1 tablet (0.5 mg total) by mouth 2 (two) times daily as needed for  Insomnia or Anxiety. 60 tablet 0    amLODIPine (NORVASC) 10 MG tablet Take 1 tablet (10 mg total) by mouth once daily. 90 tablet 2    aspirin (ECOTRIN) 81 MG EC tablet Take 1 tablet (81 mg total) by mouth once daily. 90 tablet 0    atorvastatin (LIPITOR) 40 MG tablet Take 1 tablet (40 mg total) by mouth once daily. 90 tablet 3    bismuth subsalicylate (PEPTO BISMOL) 262 mg/15 mL suspension Take 15 mLs by mouth every 6 (six) hours as needed for Indigestion.      blood pressure kit med and lrg Kit Take blood pressure first thing in the morning. 1 each 0    blood pressure kit med and lrg Kit Check blood pressure every morning 1 each 0    blood pressure kit-extra large Kit       butalbital-acetaminophen-caff -40 mg -40 mg Cap Take 1 capsule by mouth 2 (two) times daily as needed (headache). 90 capsule 1    butalbital-acetaminophen-caff -40 mg -40 mg Cap Take 1 capsule by mouth 2 (two) times daily as needed (headache). 90 capsule 1    cholecalciferol, vitamin D3, 1,250 mcg (50,000 unit) capsule Take 1 capsule (50,000 Units total) by mouth once a week. 12 capsule 0    cloNIDine (CATAPRES) 0.1 MG tablet Take 1 tablet (0.1 mg total) by mouth 2 (two) times daily. 180 tablet 1    cloNIDine (CATAPRES) 0.1 MG tablet Take 1 tablet (0.1 mg total) by mouth 3 (three) times daily. 90 tablet 1    doxazosin (CARDURA) 4 MG tablet Take 1 tablet (4 mg total) by mouth every evening. 90 tablet 0    duloxetine HCl (INV DULOXETINE/PLACEBO) capsule Take 1 capsule (30 mg total) by mouth once daily. 30 capsule 1    furosemide (LASIX) 20 MG tablet take 1/2 tablet by mouth once a day. 90 tablet 0    gabapentin (NEURONTIN) 600 MG tablet Take 1 tablet (600 mg total) by mouth 3 (three) times daily. 270 tablet 1    hydrALAZINE (APRESOLINE) 100 MG tablet TAKE 1 TABLET BY MOUTH EVERY 8 HOURS 90 tablet 5    hydroCHLOROthiazide (HYDRODIURIL) 50 MG tablet Take 1 tablet (50 mg total) by mouth once daily. 90 tablet  3    hydrOXYzine HCL (ATARAX) 25 MG tablet Take 1 tablet (25 mg total) by mouth every evening. 60 tablet 0    inhalation spacing device (COMPACT SPACE CHAMBER) Use as directed for inhalation. 1 Device 0    letrozole (FEMARA) 2.5 mg Tab Take 1 tablet (2.5 mg total) by mouth once daily. 30 tablet 11    LIDOcaine (LIDODERM) 5 % Place 2 patches onto the skin once daily. Remove & Discard patch within 12 hours or as directed by MD Conner patch 1    multivitamin (THERAGRAN) per tablet Take 1 tablet by mouth once daily.      NARCAN 4 mg/actuation [place] 1CALL 911. ADMINISTER A SINGLE SPRAY INTRANASALLY INTO ONE NOSTRIL UPON SIGNS OF OPIOID OVERDOSE. MAY REPEAT AFTER 3 MINUTES IF NO RESPONSE. 1 each 0    oxyCODONE-acetaminophen (PERCOCET) 7.5-325 mg per tablet Take 1 tablet by mouth every 4 (four) hours as needed for Pain. 90 tablet 0    propranoloL (INDERAL) 40 MG tablet Take 1 tablet by mouth twice daily 60 tablet 5    telmisartan (MICARDIS) 80 MG Tab Take 1 tablet (80 mg total) by mouth once daily. 90 tablet 1    venlafaxine (EFFEXOR-XR) 75 MG 24 hr capsule Take 1 capsule (75 mg total) by mouth once daily. 90 capsule 1    zolpidem (AMBIEN) 5 MG Tab Take 1 tablet (5 mg total) by mouth nightly as needed (sleep). 60 tablet 1     No current facility-administered medications on file prior to visit.       Medications have been reviewed and reconciled with patient at visit today.    Barriers to medications present (no )    Adverse reactions to current medications (no)      Exam:  Vitals:    02/15/22 1010   BP: (!) 91/54   Pulse: 63   Temp: 97.7 °F (36.5 °C)     Weight: 135.2 kg (298 lb 1 oz)   Body mass index is 44.02 kg/m².      BP Readings from Last 3 Encounters:   02/15/22 (!) 91/54   11/05/21 97/73   10/18/21 124/80     Wt Readings from Last 3 Encounters:   02/15/22 1010 135.2 kg (298 lb 1 oz)   11/05/21 0833 135.2 kg (298 lb 1 oz)   10/18/21 1506 (!) 138.5 kg (305 lb 5.4 oz)            Physical Exam  Constitutional:        Appearance: She is obese.      Comments: Very restless, slightly diaphoretic.    HENT:      Mouth/Throat:      Mouth: Mucous membranes are moist.   Eyes:      General: No scleral icterus.  Cardiovascular:      Rate and Rhythm: Normal rate and regular rhythm.      Heart sounds: Normal heart sounds.   Pulmonary:      Breath sounds: Normal breath sounds.      Comments: tachypnea  Abdominal:      General: Bowel sounds are normal.      Tenderness: There is no abdominal tenderness.   Musculoskeletal:      Comments: Pain reproduced right calf.   Skin:     General: Skin is warm.   Neurological:      Mental Status: She is alert and oriented to person, place, and time.   Psychiatric:      Comments: Very anxious         Laboratory Reviewed: (Yes)  Lab Results   Component Value Date    WBC 9.38 09/28/2021    HGB 11.5 (L) 09/28/2021    HCT 38.6 09/28/2021     09/28/2021    CHOL 221 (H) 10/19/2020    TRIG 143 10/19/2020    HDL 55 10/19/2020    ALT 26 09/28/2021    AST 21 09/28/2021     09/28/2021    K 4.5 09/28/2021     09/28/2021    CREATININE 1.3 09/28/2021    BUN 17 09/28/2021    CO2 22 (L) 09/28/2021    TSH 1.460 10/18/2021    INR 1.0 11/10/2020    HGBA1C 5.9 (H) 05/18/2019           Health Maintenance  Health Maintenance Topics with due status: Not Due       Topic Last Completion Date    DEXA SCAN 07/27/2021    Mammogram 09/24/2021    High Dose Statin 02/15/2022     Health Maintenance Due   Topic Date Due    TETANUS VACCINE  Never done    Colorectal Cancer Screening  Never done    Shingles Vaccine (1 of 2) Never done    Pneumococcal Vaccines (Age 65+) (2 of 4 - PPSV23) 10/20/2020    Lipid Panel  10/19/2021       Assessment:  Problem List Items Addressed This Visit        Other    Other chest pain     Unclear etiology.  Reports h/o abnormal stress test, PE, RLE pain/edema x 1 month and substernal mass.  Difficulty obtaining BP, although pt alert. Diminished radial pulse.  Agrees to go to ER for  further eval of chest pressure, dyspnea.            Other Visit Diagnoses     Chest pain, unspecified type    -  Primary    Relevant Orders    Refer to Emergency Dept.            Plan:  Chest pain, unspecified type  -     Refer to Emergency Dept.    Other chest pain      -Patient's lab results were reviewed and discussed with patient  -Treatment options and alternatives were discussed with the patient. Patient expressed understanding. Patient was given the opportunity to ask questions and be an active participant in their medical care. Patient had no further questions or concerns at this time.   -Documentation of patient's health and condition was obtained from family member who was present during visit.  -Patient is an overall moderate risk for health complications from their medical conditions.     1119 am - pt discharged with EMS to ER in stable condition.     Follow up: Follow up after ER evaluation.    Report phoned to EZLA Marquis in ER.     After visit summary printed and given to patient upon discharge.  Patient goals and care plan are included in After visit summary.    Total medical decision making time was 32 min.  The following issues were discussed: The primary encounter diagnosis was Chest pain, unspecified type. A diagnosis of Other chest pain was also pertinent to this visit.    Health maintenance needs, recent test results and goals of care discussed with pt and questions answered.

## 2022-02-15 NOTE — ASSESSMENT & PLAN NOTE
Unclear etiology.  Reports h/o abnormal stress test, PE, RLE pain/edema x 1 month and substernal mass.  Difficulty obtaining BP, although pt alert. Diminished radial pulse.  Agrees to go to ER for further eval of chest pressure, dyspnea.

## 2022-02-15 NOTE — PHARMACY MED REC
"Admission Medication History     The home medication history was taken by Petey Webster.    You may go to "Admission" then "Reconcile Home Medications" tabs to review and/or act upon these items.      The home medication list has been updated by the Pharmacy department.    Please read ALL comments highlighted in yellow.    Please address this information as you see fit.     Feel free to contact us if you have any questions or require assistance.      The medications listed below were removed from the home medication list. Please reorder if appropriate:  Patient reports no longer taking the following medication(s):   BUTALBITAL-ACETAMINOPHEN-CAFFEINE -40 MG CAPSULE   OXYCODONE-ACETAMINOPHEN 7.5-325 MG TABLET    DULOXETINE 20 MG CAPSULE    Medications listed below were obtained from: Patient/family, Analytic software- [employer] and Medical records  (Not in a hospital admission)      Potential issues to be addressed PRIOR TO DISCHARGE  Patient requested refills for the following medications: (BUTALBITAL-ACETAMINOPHEN-CAFFEINE -40 MG CAPSULE; OXYCODONE-ACETAMINOPHEN 7.5-325 MG TABLET; DULOXETINE 20 MG CAPSULE)    Petey Webster, [facility] [place] [phone]      Current Outpatient Medications on File Prior to Encounter   Medication Sig Dispense Refill Last Dose    albuterol (VENTOLIN HFA) 90 mcg/actuation inhaler Inhale 2 puffs into the lungs every 4 (four) hours as needed for Shortness of Breath. 18 g 11 2/12/2022    ALPRAZolam (XANAX) 0.5 MG tablet Take 1 tablet (0.5 mg total) by mouth 2 (two) times daily as needed for Insomnia or Anxiety. 60 tablet 0 2/15/2022 at Unknown time    amLODIPine (NORVASC) 10 MG tablet Take 1 tablet (10 mg total) by mouth once daily. 90 tablet 2 2/14/2022    aspirin (ECOTRIN) 81 MG EC tablet Take 81 mg by mouth once daily.   2/14/2022 at Unknown time    atorvastatin (LIPITOR) 40 MG tablet Take 1 tablet (40 mg total) by mouth once daily. 90 tablet 3 2/14/2022    " cholecalciferol, vitamin D3, 1,250 mcg (50,000 unit) capsule Take 1 capsule (50,000 Units total) by mouth once a week. (Patient taking differently: Take 50,000 Units by mouth once a week. on Friday) 12 capsule 0 2/11/2022    cloNIDine (CATAPRES) 0.1 MG tablet Take 1 tablet (0.1 mg total) by mouth 3 (three) times daily. 90 tablet 1 2/15/2022    doxazosin (CARDURA) 4 MG tablet Take 1 tablet (4 mg total) by mouth every evening. 90 tablet 0 2/14/2022 at Unknown time    furosemide (LASIX) 20 MG tablet take 1/2 tablet by mouth once a day. 90 tablet 0 2/14/2022 at Unknown time    gabapentin (NEURONTIN) 600 MG tablet Take 1 tablet (600 mg total) by mouth 3 (three) times daily. (Patient taking differently: Take 600 mg by mouth 2 (two) times daily.) 270 tablet 1 2/14/2022 at Unknown time    hydrALAZINE (APRESOLINE) 100 MG tablet TAKE 1 TABLET BY MOUTH EVERY 8 HOURS 90 tablet 5 2/14/2022 at Unknown time    hydroCHLOROthiazide (HYDRODIURIL) 50 MG tablet Take 1 tablet (50 mg total) by mouth once daily. 90 tablet 3 2/14/2022 at Unknown time    hydrOXYzine HCL (ATARAX) 25 MG tablet Take 1 tablet (25 mg total) by mouth every evening. 60 tablet 0 2/14/2022 at Unknown time    letrozole (FEMARA) 2.5 mg Tab Take 1 tablet (2.5 mg total) by mouth once daily. 30 tablet 11 2/15/2022 at Unknown time    LIDOcaine (LIDODERM) 5 % Place 2 patches onto the skin once daily. Remove & Discard patch within 12 hours or as directed by MD 90 patch 1 2/14/2022 at Unknown time    multivitamin (THERAGRAN) per tablet Take 1 tablet by mouth once daily.   2/14/2022    propranoloL (INDERAL) 40 MG tablet Take 1 tablet by mouth twice daily 60 tablet 5 2/14/2022 at Unknown time    telmisartan (MICARDIS) 80 MG Tab Take 1 tablet (80 mg total) by mouth once daily. 90 tablet 1 2/14/2022 at Unknown time    venlafaxine (EFFEXOR-XR) 75 MG 24 hr capsule Take 1 capsule (75 mg total) by mouth once daily. 90 capsule 1 2/14/2022 at Unknown time    zolpidem  (AMBIEN) 5 MG Tab Take 1 tablet (5 mg total) by mouth nightly as needed (sleep). 60 tablet 1 2/14/2022 at Unknown time    [DISCONTINUED] ALPRAZolam (XANAX) 0.5 MG tablet Take 1 tablet (0.5 mg total) by mouth 2 (two) times daily as needed for Insomnia or Anxiety. 60 tablet 0 2/15/2022    [DISCONTINUED] butalbital-acetaminophen-caff -40 mg -40 mg Cap Take 1 capsule by mouth 2 (two) times daily as needed (headache). 90 capsule 1 2/15/2022    [DISCONTINUED] aspirin (ECOTRIN) 81 MG EC tablet Take 1 tablet (81 mg total) by mouth once daily. 90 tablet 0     [DISCONTINUED] bismuth subsalicylate (PEPTO BISMOL) 262 mg/15 mL suspension Take 15 mLs by mouth every 6 (six) hours as needed for Indigestion.       [DISCONTINUED] blood pressure kit med and lrg Kit Take blood pressure first thing in the morning. 1 each 0     [DISCONTINUED] blood pressure kit med and lrg Kit Check blood pressure every morning 1 each 0     [DISCONTINUED] blood pressure kit-extra large Kit        [DISCONTINUED] butalbital-acetaminophen-caff -40 mg -40 mg Cap Take 1 capsule by mouth 2 (two) times daily as needed (headache). 90 capsule 1 More than a month at Unknown time    [DISCONTINUED] cloNIDine (CATAPRES) 0.1 MG tablet Take 1 tablet (0.1 mg total) by mouth 2 (two) times daily. 180 tablet 1     [DISCONTINUED] duloxetine HCl (INV DULOXETINE/PLACEBO) capsule Take 1 capsule (30 mg total) by mouth once daily. 30 capsule 1     [DISCONTINUED] inhalation spacing device (COMPACT SPACE CHAMBER) Use as directed for inhalation. 1 Device 0     [DISCONTINUED] NARCAN 4 mg/actuation [place] 1CALL 911. ADMINISTER A SINGLE SPRAY INTRANASALLY INTO ONE NOSTRIL UPON SIGNS OF OPIOID OVERDOSE. MAY REPEAT AFTER 3 MINUTES IF NO RESPONSE. 1 each 0     [DISCONTINUED] oxyCODONE-acetaminophen (PERCOCET) 7.5-325 mg per tablet Take 1 tablet by mouth every 4 (four) hours as needed for Pain. 90 tablet 0                          .

## 2022-02-16 LAB
ALBUMIN SERPL BCP-MCNC: 2.7 G/DL (ref 3.5–5.2)
ALP SERPL-CCNC: 109 U/L (ref 55–135)
ALT SERPL W/O P-5'-P-CCNC: 6 U/L (ref 10–44)
ANION GAP SERPL CALC-SCNC: 8 MMOL/L (ref 8–16)
AORTIC ROOT ANNULUS: 3.21 CM
APTT BLDCRRT: 139 SEC (ref 21–32)
APTT BLDCRRT: 40.7 SEC (ref 21–32)
APTT BLDCRRT: 68.9 SEC (ref 21–32)
ASCENDING AORTA: 3.01 CM
AST SERPL-CCNC: 10 U/L (ref 10–40)
AV INDEX (PROSTH): 0.79
AV MEAN GRADIENT: 11 MMHG
AV PEAK GRADIENT: 17 MMHG
AV VALVE AREA: 2.25 CM2
AV VELOCITY RATIO: 0.7
BASOPHILS # BLD AUTO: 0.06 K/UL (ref 0–0.2)
BASOPHILS NFR BLD: 0.8 % (ref 0–1.9)
BILIRUB SERPL-MCNC: 0.3 MG/DL (ref 0.1–1)
BSA FOR ECHO PROCEDURE: 2.67 M2
BUN SERPL-MCNC: 24 MG/DL (ref 8–23)
CALCIUM SERPL-MCNC: 8.6 MG/DL (ref 8.7–10.5)
CHLORIDE SERPL-SCNC: 111 MMOL/L (ref 95–110)
CO2 SERPL-SCNC: 22 MMOL/L (ref 23–29)
CREAT SERPL-MCNC: 1.5 MG/DL (ref 0.5–1.4)
CV ECHO LV RWT: 0.64 CM
DIFFERENTIAL METHOD: ABNORMAL
DOP CALC AO PEAK VEL: 2.04 M/S
DOP CALC AO VTI: 40 CM
DOP CALC LVOT AREA: 2.9 CM2
DOP CALC LVOT DIAMETER: 1.91 CM
DOP CALC LVOT PEAK VEL: 1.43 M/S
DOP CALC LVOT STROKE VOLUME: 89.92 CM3
DOP CALC RVOT PEAK VEL: 1.18 M/S
DOP CALC RVOT VTI: 26.3 CM
DOP CALCLVOT PEAK VEL VTI: 31.4 CM
E WAVE DECELERATION TIME: 314.2 MSEC
E/A RATIO: 0.87
E/E' RATIO: 6.43 M/S
ECHO EF ESTIMATED: 61 %
ECHO LV POSTERIOR WALL: 1.51 CM (ref 0.6–1.1)
EJECTION FRACTION: 60 %
EOSINOPHIL # BLD AUTO: 0.2 K/UL (ref 0–0.5)
EOSINOPHIL NFR BLD: 2.6 % (ref 0–8)
ERYTHROCYTE [DISTWIDTH] IN BLOOD BY AUTOMATED COUNT: 15.3 % (ref 11.5–14.5)
EST. GFR  (AFRICAN AMERICAN): 40 ML/MIN/1.73 M^2
EST. GFR  (NON AFRICAN AMERICAN): 35 ML/MIN/1.73 M^2
FRACTIONAL SHORTENING: 32 % (ref 28–44)
GLUCOSE SERPL-MCNC: 104 MG/DL (ref 70–110)
HCT VFR BLD AUTO: 32.8 % (ref 37–48.5)
HGB BLD-MCNC: 10.3 G/DL (ref 12–16)
IMM GRANULOCYTES # BLD AUTO: 0.02 K/UL (ref 0–0.04)
IMM GRANULOCYTES NFR BLD AUTO: 0.3 % (ref 0–0.5)
INTERVENTRICULAR SEPTUM: 1.85 CM (ref 0.6–1.1)
IVC DIAMETER: 1.5 CM
IVRT: 59.98 MSEC
LA MAJOR: 5.07 CM
LA MINOR: 5.11 CM
LA WIDTH: 3.9 CM
LEFT ATRIUM SIZE: 3.38 CM
LEFT ATRIUM VOLUME INDEX: 22.5 ML/M2
LEFT ATRIUM VOLUME: 57.03 CM3
LEFT INTERNAL DIMENSION IN SYSTOLE: 3.18 CM (ref 2.1–4)
LEFT VENTRICLE DIASTOLIC VOLUME INDEX: 40.67 ML/M2
LEFT VENTRICLE DIASTOLIC VOLUME: 102.89 ML
LEFT VENTRICLE MASS INDEX: 139 G/M2
LEFT VENTRICLE SYSTOLIC VOLUME INDEX: 15.9 ML/M2
LEFT VENTRICLE SYSTOLIC VOLUME: 40.3 ML
LEFT VENTRICULAR INTERNAL DIMENSION IN DIASTOLE: 4.71 CM (ref 3.5–6)
LEFT VENTRICULAR MASS: 350.93 G
LV LATERAL E/E' RATIO: 6.73 M/S
LV SEPTAL E/E' RATIO: 6.17 M/S
LVOT MG: 5.6 MMHG
LVOT MV: 1.13 CM/S
LYMPHOCYTES # BLD AUTO: 2.6 K/UL (ref 1–4.8)
LYMPHOCYTES NFR BLD: 32.7 % (ref 18–48)
MAGNESIUM SERPL-MCNC: 2.1 MG/DL (ref 1.6–2.6)
MCH RBC QN AUTO: 24.9 PG (ref 27–31)
MCHC RBC AUTO-ENTMCNC: 31.4 G/DL (ref 32–36)
MCV RBC AUTO: 79 FL (ref 82–98)
MONOCYTES # BLD AUTO: 0.6 K/UL (ref 0.3–1)
MONOCYTES NFR BLD: 7 % (ref 4–15)
MV PEAK A VEL: 0.85 M/S
MV PEAK E VEL: 0.74 M/S
MV STENOSIS PRESSURE HALF TIME: 91.12 MS
MV VALVE AREA P 1/2 METHOD: 2.41 CM2
NEUTROPHILS # BLD AUTO: 4.5 K/UL (ref 1.8–7.7)
NEUTROPHILS NFR BLD: 56.6 % (ref 38–73)
NRBC BLD-RTO: 0 /100 WBC
PISA MRMAX VEL: 3.84 M/S
PISA TR MAX VEL: 2.86 M/S
PLATELET # BLD AUTO: 200 K/UL (ref 150–450)
PMV BLD AUTO: 9.7 FL (ref 9.2–12.9)
POTASSIUM SERPL-SCNC: 3.9 MMOL/L (ref 3.5–5.1)
PROT SERPL-MCNC: 6.2 G/DL (ref 6–8.4)
PV MEAN GRADIENT: 4.01 MMHG
RA MAJOR: 4.07 CM
RA PRESSURE: 3 MMHG
RBC # BLD AUTO: 4.13 M/UL (ref 4–5.4)
SODIUM SERPL-SCNC: 141 MMOL/L (ref 136–145)
TDI LATERAL: 0.11 M/S
TDI SEPTAL: 0.12 M/S
TDI: 0.12 M/S
TR MAX PG: 33 MMHG
TR MEAN GRADIENT: 25 MMHG
TRICUSPID ANNULAR PLANE SYSTOLIC EXCURSION: 2 CM
TROPONIN I SERPL DL<=0.01 NG/ML-MCNC: 0.01 NG/ML (ref 0–0.03)
TROPONIN I SERPL DL<=0.01 NG/ML-MCNC: 0.01 NG/ML (ref 0–0.03)
TV REST PULMONARY ARTERY PRESSURE: 36 MMHG
WBC # BLD AUTO: 7.97 K/UL (ref 3.9–12.7)

## 2022-02-16 PROCEDURE — 85025 COMPLETE CBC W/AUTO DIFF WBC: CPT | Performed by: INTERNAL MEDICINE

## 2022-02-16 PROCEDURE — 36573 INSJ PICC RS&I 5 YR+: CPT

## 2022-02-16 PROCEDURE — 85730 THROMBOPLASTIN TIME PARTIAL: CPT | Mod: 91 | Performed by: STUDENT IN AN ORGANIZED HEALTH CARE EDUCATION/TRAINING PROGRAM

## 2022-02-16 PROCEDURE — 85730 THROMBOPLASTIN TIME PARTIAL: CPT | Performed by: INTERNAL MEDICINE

## 2022-02-16 PROCEDURE — 25000003 PHARM REV CODE 250: Performed by: INTERNAL MEDICINE

## 2022-02-16 PROCEDURE — 36415 COLL VENOUS BLD VENIPUNCTURE: CPT | Performed by: INTERNAL MEDICINE

## 2022-02-16 PROCEDURE — 96366 THER/PROPH/DIAG IV INF ADDON: CPT

## 2022-02-16 PROCEDURE — C1751 CATH, INF, PER/CENT/MIDLINE: HCPCS

## 2022-02-16 PROCEDURE — 36415 COLL VENOUS BLD VENIPUNCTURE: CPT | Performed by: STUDENT IN AN ORGANIZED HEALTH CARE EDUCATION/TRAINING PROGRAM

## 2022-02-16 PROCEDURE — 83735 ASSAY OF MAGNESIUM: CPT | Performed by: INTERNAL MEDICINE

## 2022-02-16 PROCEDURE — G0378 HOSPITAL OBSERVATION PER HR: HCPCS

## 2022-02-16 PROCEDURE — 96361 HYDRATE IV INFUSION ADD-ON: CPT | Mod: 59

## 2022-02-16 PROCEDURE — 63600175 PHARM REV CODE 636 W HCPCS: Performed by: EMERGENCY MEDICINE

## 2022-02-16 PROCEDURE — 25000003 PHARM REV CODE 250: Performed by: STUDENT IN AN ORGANIZED HEALTH CARE EDUCATION/TRAINING PROGRAM

## 2022-02-16 PROCEDURE — 96365 THER/PROPH/DIAG IV INF INIT: CPT | Mod: 59

## 2022-02-16 PROCEDURE — 84484 ASSAY OF TROPONIN QUANT: CPT | Mod: 91 | Performed by: INTERNAL MEDICINE

## 2022-02-16 PROCEDURE — 94761 N-INVAS EAR/PLS OXIMETRY MLT: CPT

## 2022-02-16 PROCEDURE — 84484 ASSAY OF TROPONIN QUANT: CPT | Performed by: INTERNAL MEDICINE

## 2022-02-16 PROCEDURE — 80053 COMPREHEN METABOLIC PANEL: CPT | Performed by: INTERNAL MEDICINE

## 2022-02-16 RX ORDER — HYDROCODONE BITARTRATE AND ACETAMINOPHEN 5; 325 MG/1; MG/1
1 TABLET ORAL EVERY 6 HOURS PRN
Status: DISCONTINUED | OUTPATIENT
Start: 2022-02-16 | End: 2022-02-18 | Stop reason: HOSPADM

## 2022-02-16 RX ORDER — GABAPENTIN 300 MG/1
300 CAPSULE ORAL 3 TIMES DAILY
Status: DISCONTINUED | OUTPATIENT
Start: 2022-02-16 | End: 2022-02-18 | Stop reason: HOSPADM

## 2022-02-16 RX ORDER — CLONIDINE HYDROCHLORIDE 0.1 MG/1
0.1 TABLET ORAL 3 TIMES DAILY
Status: DISCONTINUED | OUTPATIENT
Start: 2022-02-16 | End: 2022-02-18 | Stop reason: HOSPADM

## 2022-02-16 RX ADMIN — ACETAMINOPHEN 650 MG: 325 TABLET ORAL at 02:02

## 2022-02-16 RX ADMIN — AMLODIPINE BESYLATE 10 MG: 10 TABLET ORAL at 09:02

## 2022-02-16 RX ADMIN — HEPARIN SODIUM 18 UNITS/KG/HR: 10000 INJECTION, SOLUTION INTRAVENOUS at 11:02

## 2022-02-16 RX ADMIN — GABAPENTIN 300 MG: 300 CAPSULE ORAL at 09:02

## 2022-02-16 RX ADMIN — ATORVASTATIN CALCIUM 40 MG: 40 TABLET, FILM COATED ORAL at 09:02

## 2022-02-16 RX ADMIN — HYDRALAZINE HYDROCHLORIDE 100 MG: 25 TABLET, FILM COATED ORAL at 09:02

## 2022-02-16 RX ADMIN — MULTIPLE VITAMINS W/ MINERALS TAB 1 TABLET: TAB at 09:02

## 2022-02-16 RX ADMIN — ALPRAZOLAM 0.5 MG: 0.5 TABLET ORAL at 02:02

## 2022-02-16 RX ADMIN — HYDROCODONE BITARTRATE AND ACETAMINOPHEN 1 TABLET: 5; 325 TABLET ORAL at 02:02

## 2022-02-16 RX ADMIN — GABAPENTIN 300 MG: 300 CAPSULE ORAL at 02:02

## 2022-02-16 RX ADMIN — HYDRALAZINE HYDROCHLORIDE 100 MG: 25 TABLET, FILM COATED ORAL at 02:02

## 2022-02-16 RX ADMIN — ASPIRIN 81 MG: 81 TABLET ORAL at 09:02

## 2022-02-16 RX ADMIN — DOXAZOSIN 4 MG: 2 TABLET ORAL at 09:02

## 2022-02-16 RX ADMIN — LETROZOLE 2.5 MG: 2.5 TABLET ORAL at 09:02

## 2022-02-16 RX ADMIN — ZOLPIDEM TARTRATE 5 MG: 5 TABLET, COATED ORAL at 10:02

## 2022-02-16 RX ADMIN — SODIUM CHLORIDE: 0.9 INJECTION, SOLUTION INTRAVENOUS at 05:02

## 2022-02-16 RX ADMIN — CLONIDINE HYDROCHLORIDE 0.1 MG: 0.1 TABLET ORAL at 09:02

## 2022-02-16 RX ADMIN — ACETAMINOPHEN 650 MG: 325 TABLET ORAL at 09:02

## 2022-02-16 RX ADMIN — VENLAFAXINE HYDROCHLORIDE 75 MG: 75 CAPSULE, EXTENDED RELEASE ORAL at 09:02

## 2022-02-16 RX ADMIN — HYDRALAZINE HYDROCHLORIDE 100 MG: 25 TABLET, FILM COATED ORAL at 06:02

## 2022-02-16 RX ADMIN — ALPRAZOLAM 0.5 MG: 0.5 TABLET ORAL at 01:02

## 2022-02-16 RX ADMIN — HEPARIN SODIUM 18 UNITS/KG/HR: 10000 INJECTION, SOLUTION INTRAVENOUS at 05:02

## 2022-02-16 NOTE — ASSESSMENT & PLAN NOTE
Creatinine currently elevated 1.7 (baseline 1.1-1.4).  Avoid nephrotoxic agents, including contrast for CT.  Normal saline at 100 cc/hour for the next 24 hours.  Repeat labs in a.m..  Monitor urine output.

## 2022-02-16 NOTE — ASSESSMENT & PLAN NOTE
Unable to obtain CTA chest due to elevated creatinine 1.7.  V/Q scan intermediate with large defect.  Started on heparin drip in the ED.  Transition to oral anticoagulation in 1-2 days.  Monitor on telemetry.  Hemodynamically stable.  Echocardiogram in a.m..

## 2022-02-16 NOTE — ASSESSMENT & PLAN NOTE
Creatinine currently elevated 1.7 (baseline 1.1-1.4).  Avoid nephrotoxic agents, including contrast for CT.  Normal saline at 100 cc/hour for the next 24 hours.  Repeat labs in a.m..  Monitor urine output.    2/16:  Cr trending down

## 2022-02-16 NOTE — PROCEDURES
"Susu Luther is a 72 y.o. female patient.    Temp: 97.6 °F (36.4 °C) (02/16/22 0714)  Pulse: 66 (02/16/22 0714)  Resp: 18 (02/16/22 0714)  BP: (!) 142/68 (02/16/22 0714)  SpO2: 98 % (02/16/22 0714)  Weight: (!) 146.5 kg (322 lb 15.6 oz) (02/16/22 0039)  Height: 5' 9.5" (176.5 cm) (02/15/22 1158)    PICC  Date/Time: 2/16/2022 8:40 AM  Performed by: Georges Boss RN  Consent Done: Yes  Time out: Immediately prior to procedure a time out was called to verify the correct patient, procedure, equipment, support staff and site/side marked as required  Indications: vascular access  Anesthesia: local infiltration  Local anesthetic: lidocaine 1% without epinephrine  Anesthetic Total (mL): 2  Preparation: skin prepped with chlorhexidine (without alcohol)  Skin prep agent dried: skin prep agent completely dried prior to procedure  Sterile barriers: all five maximum sterile barriers used - cap, mask, sterile gown, sterile gloves, and large sterile sheet  Hand hygiene: hand hygiene performed prior to central venous catheter insertion  Location details: left basilic  Catheter type: double lumen  Catheter size: 5 Fr  Catheter Length: 43cm    Ultrasound guidance: yes  Vessel Caliber: medium and patent, compressibility normal  Needle advanced into vessel with real time Ultrasound guidance.  Guidewire confirmed in vessel.  Sterile sheath used.  Number of attempts: 1  Post-procedure: blood return through all ports, chlorhexidine patch and sterile dressing applied  Estimated blood loss (mL): 0  Specimens: No  Implants: No  Comments: Awaiting xray for confirmation of placement           Georges Boss RN  2/16/2022  "

## 2022-02-16 NOTE — SUBJECTIVE & OBJECTIVE
Review of Systems   Constitutional: Negative.  Negative for chills, fatigue and fever.   HENT: Negative.  Negative for congestion and nosebleeds.    Eyes: Negative.    Respiratory: Positive for chest tightness and shortness of breath. Negative for cough and wheezing.    Cardiovascular: Positive for chest pain. Negative for palpitations.   Gastrointestinal: Negative.  Negative for abdominal pain, diarrhea, nausea and vomiting.   Endocrine: Negative.    Genitourinary: Negative.  Negative for dysuria.   Musculoskeletal: Negative.  Negative for back pain.   Skin: Negative.  Negative for color change and rash.   Allergic/Immunologic: Positive for immunocompromised state.   Neurological: Positive for dizziness. Negative for syncope, speech difficulty and headaches.   Hematological: Negative.    Psychiatric/Behavioral: Negative.  Negative for confusion, decreased concentration and hallucinations. The patient is not nervous/anxious.    All other systems reviewed and are negative.    Objective:     Vital Signs (Most Recent):  Temp: 97.6 °F (36.4 °C) (02/16/22 1327)  Pulse: 92 (02/16/22 1328)  Resp: 18 (02/16/22 1404)  BP: (!) 157/81 (02/16/22 1328)  SpO2: 97 % (02/16/22 1327) Vital Signs (24h Range):  Temp:  [97.5 °F (36.4 °C)-98.5 °F (36.9 °C)] 97.6 °F (36.4 °C)  Pulse:  [52-95] 92  Resp:  [16-20] 18  SpO2:  [94 %-100 %] 97 %  BP: (123-165)/(62-94) 157/81     Weight: (!) 146.1 kg (322 lb)  Body mass index is 47.55 kg/m².    Intake/Output Summary (Last 24 hours) at 2/16/2022 1518  Last data filed at 2/16/2022 1000  Gross per 24 hour   Intake 937.71 ml   Output --   Net 937.71 ml      Physical Exam  Vitals and nursing note reviewed.   Constitutional:       General: She is awake. She is not in acute distress.     Appearance: Normal appearance. She is morbidly obese.   HENT:      Head: Normocephalic and atraumatic.      Mouth/Throat:      Mouth: Mucous membranes are moist.   Eyes:      General: No scleral icterus.      Conjunctiva/sclera: Conjunctivae normal.   Cardiovascular:      Rate and Rhythm: Normal rate and regular rhythm.      Heart sounds: Normal heart sounds. No murmur heard.      Pulmonary:      Effort: Pulmonary effort is normal. No respiratory distress.      Breath sounds: Normal breath sounds. No rhonchi.      Comments: PRANAV  Abdominal:      Palpations: Abdomen is soft.      Tenderness: There is no abdominal tenderness.   Musculoskeletal:         General: No swelling. Normal range of motion.      Cervical back: Normal range of motion.   Skin:     General: Skin is warm and dry.      Findings: No erythema.      Comments: Right lateral breast knot/pain to touch   Neurological:      General: No focal deficit present.      Mental Status: She is alert and oriented to person, place, and time. Mental status is at baseline.      Motor: No abnormal muscle tone.   Psychiatric:         Mood and Affect: Mood normal.         Behavior: Behavior normal. Behavior is cooperative.         Thought Content: Thought content normal.         Significant Labs:   All pertinent labs within the past 24 hours have been reviewed.  CBC:   Recent Labs   Lab 02/15/22  1221 02/16/22  0612   WBC 8.75 7.97   HGB 10.3* 10.3*   HCT 33.5* 32.8*    200     CMP:   Recent Labs   Lab 02/15/22  1221 02/16/22  0612    141   K 4.3 3.9    111*   CO2 25 22*    104   BUN 24* 24*   CREATININE 1.7* 1.5*   CALCIUM 9.0 8.6*   PROT 6.8 6.2   ALBUMIN 2.9* 2.7*   BILITOT 0.2 0.3   ALKPHOS 111 109   AST 10 10   ALT 8* 6*   ANIONGAP 8 8   EGFRNONAA 30* 35*       Significant Imaging: I have reviewed all pertinent imaging results/findings within the past 24 hours.   Imaging Results          NM Lung Scan Ventilation Perfusion (Final result)  Result time 02/15/22 16:33:46    Final result by Sander Gregory MD (02/15/22 16:33:46)                 Impression:      1.  Indeterminate V/Q scan.  There is a large matched defect within the inferior right lung  that does not appear to correspond to an abnormality on the chest x-ray.  No other perfusion defects noted.      Electronically signed by: Sander Gregory MD  Date:    02/15/2022  Time:    16:33             Narrative:    EXAMINATION:  NM LUNG VENTILATION AND PERFUSION IMAGING    CLINICAL HISTORY:  chest pain c hx PE;    TECHNIQUE:  The patient received 1 mCi of technetium 99 DTPA for ventilation images and 6.2 mCi of technetium 99 MAA for perfusion images.    COMPARISON:  Chest x-ray performed earlier the same day    FINDINGS:  The ventilation images demonstrate mainly central deposition of the radiopharmaceutical.  There is some decrease uptake in the inferior right lung as compared to the rest of the lungs.    Perfusion images demonstrate similar decreased uptake within the inferior right lung, matched with the ventilation defect.  The distribution of radiopharmaceutical is otherwise normal.                               US Lower Extremity Veins Bilateral (Final result)  Result time 02/15/22 13:49:36    Final result by Sander Gregory MD (02/15/22 13:49:36)                 Impression:      1.  No evidence of right or left deep venous thrombosis.      Electronically signed by: Sander Gregory MD  Date:    02/15/2022  Time:    13:49             Narrative:    EXAMINATION:  US LOWER EXTREMITY VEINS BILATERAL    CLINICAL HISTORY:  Other specified soft tissue disorders    TECHNIQUE:  Real-time, color, and duplex evaluation of the deep venous vessels in both lower extremities were performed.    COMPARISON:  No comparison studies are available.    FINDINGS:  No evidence of deep venous thrombosis in either lower extremity. The deep venous vessels demonstrate normal spontaneous and augmented flow with normal respiratory variation.  Deep venous vessels compress in a normal fashion throughout.                               X-Ray Chest AP Portable (Final result)  Result time 02/15/22 12:33:52    Final result by Sander Gregory MD  (02/15/22 12:33:52)                 Impression:      1.  Negative for acute process involving the chest, considering there are markedly low lung volumes on today's study.    2.  Stable and incidental findings as noted above.      Electronically signed by: Sander Gregory MD  Date:    02/15/2022  Time:    12:33             Narrative:    EXAMINATION:  XR CHEST AP PORTABLE    CLINICAL HISTORY:  chest pain;    COMPARISON:  [facility], 2019    FINDINGS:  EKG leads overlie the chest.  Moderately low lung volumes on the current study.  The lungs are otherwise clear.  The cardiac silhouette size is enlarged.  The trachea is midline and the mediastinal width is normal. Negative for focal infiltrate, effusion or pneumothorax. Pulmonary vasculature is normal. Negative for osseous abnormalities. Tortuous aorta.  Degenerative changes of the spine and both shoulder girdles.    Interval clips placed in the right axillary region.  There appears to be dense contrast within the left upper quadrant bowel.

## 2022-02-16 NOTE — NURSING
Notified LOLLY Yun NP of continuous difficulty with pt IV. PICC ordered, awaiting PICC team arrival. Heparin gtt stopped at 0505.

## 2022-02-16 NOTE — ASSESSMENT & PLAN NOTE
Patient is identified as having Diastolic (HFpEF) heart failure that is Chronic. CHF is currently controlled. Latest ECHO performed and demonstrates- No results found for this or any previous visit.  . Continue Furosemide and monitor clinical status closely. Monitor on telemetry. Patient is off CHF pathway.  Monitor strict Is&Os and daily weights.  Place on fluid restriction of 2 L. Continue to stress to patient importance of self efficacy and  on diet for CHF. Last BNP reviewed- and noted below   Recent Labs   Lab 02/15/22  1221   *   .Hold Lasix for now due to worsening renal function.

## 2022-02-16 NOTE — ASSESSMENT & PLAN NOTE
Unable to obtain CTA chest due to elevated creatinine 1.7.  V/Q scan intermediate with large defect.  Started on heparin drip in the ED.  Transition to oral anticoagulation in 1-2 days.  Monitor on telemetry.  Hemodynamically stable.  Echocardiogram stable w/o RV strain

## 2022-02-16 NOTE — NURSING
Patient notified CNA of her fall attempting to go to the bathroom. She complained of pain to right wrist and right arm. Notified MD of fall. Asked MD for pain meds. Patient denied any other pain. No other concerns voiced at this time.

## 2022-02-16 NOTE — H&P
[facility]  [facility]  History & Physical    Patient Name: Susu Luther  MRN: [id number]  Patient Class: OP- Observation  Admission Date: 2/15/2022  Attending Physician: Zeb Watson MD   Primary Care Provider: Peace Arias MD         Patient information was obtained from patient, past medical records and ER records.     Subjective:     Principal Problem:Acute pulmonary embolism    Chief Complaint:   Chief Complaint   Patient presents with    Hypotension     Sent from pcp for hypotension and chest pressure that began today, pt reports feeling fatigued yesterday        HPI: Ms. Luther is a 72-year-old  female with PMH significant for PE/DVT in the past, stopped anticoagulation at the time of AAA repair last year, diastolic CHF, HTN, breast cancer currently on Femara, presented to the ED complaining of shortness of breath and chest discomfort for the past 1-2 weeks associated with worsened exertional dyspnea.  Non home oxygen dependent.  Also complaining of bilateral lower extremity pain.  D-dimer mildly elevated 0.63.  Ultrasound lower extremities negative for DVT.  Unable to obtain CT PE protocol due to elevated creatinine of 1.7 (baseline 1.1-1.4).  V/Q scan shows intermediate in probability with large matched defect within the inferior right lung.  Started on heparin drip.  HR in the 50s.  /75.    Admitting diagnosis:  Acute PE.      Past Medical History:   Diagnosis Date    Chronic diastolic congestive heart failure 8/25/2020    Encounter for blood transfusion     History of repair of aneurysm of abdominal aorta using endovascular stent graft     DR Bonds    Hx of psychiatric care     Hypertension     Major depressive disorder, single episode, moderate with anxious distress 1/14/2020    Malignant neoplasm of upper-outer quadrant of right breast in female, estrogen receptor positive 3/14/2019    radiation    Psychiatric problem     Sleep apnea     Sleep  difficulties     Stroke     no residual defect    Therapy        Past Surgical History:   Procedure Laterality Date    APPENDECTOMY      AXILLARY NODE DISSECTION Right 2020    Procedure: LYMPHADENECTOMY, AXILLARY;  Surgeon: Artemio Starks MD;  Location: [facility] OR;  Service: General;  Laterality: Right;    BIOPSY OF AXILLARY NODE Right 3/2/2021    Procedure: BIOPSY, LYMPH NODE, AXILLARY;  Surgeon: Artemio Sutton MD;  Location: [address];  Service: General;  Laterality: Right;    BREAST BIOPSY          BREAST LUMPECTOMY           SECTION      x 1    OOPHORECTOMY      right     SENTINEL LYMPH NODE BIOPSY Right 3/27/2019    Procedure: BIOPSY, LYMPH NODE, SENTINEL;  Surgeon: Artemio Starks MD;  Location: [facility] OR;  Service: General;  Laterality: Right;       Review of patient's allergies indicates:   Allergen Reactions    Pcn [penicillins] Hives       No current facility-administered medications on file prior to encounter.     Current Outpatient Medications on File Prior to Encounter   Medication Sig    albuterol (VENTOLIN HFA) 90 mcg/actuation inhaler Inhale 2 puffs into the lungs every 4 (four) hours as needed for Shortness of Breath.    ALPRAZolam (XANAX) 0.5 MG tablet Take 1 tablet (0.5 mg total) by mouth 2 (two) times daily as needed for Insomnia or Anxiety.    amLODIPine (NORVASC) 10 MG tablet Take 1 tablet (10 mg total) by mouth once daily.    aspirin (ECOTRIN) 81 MG EC tablet Take 81 mg by mouth once daily.    atorvastatin (LIPITOR) 40 MG tablet Take 1 tablet (40 mg total) by mouth once daily.    cholecalciferol, vitamin D3, 1,250 mcg (50,000 unit) capsule Take 1 capsule (50,000 Units total) by mouth once a week. (Patient taking differently: Take 50,000 Units by mouth once a week. on Friday)    cloNIDine (CATAPRES) 0.1 MG tablet Take 1 tablet (0.1 mg total) by mouth 3 (three) times daily.    doxazosin (CARDURA) 4 MG tablet Take 1 tablet (4 mg total) by  mouth every evening.    furosemide (LASIX) 20 MG tablet take 1/2 tablet by mouth once a day.    gabapentin (NEURONTIN) 600 MG tablet Take 1 tablet (600 mg total) by mouth 3 (three) times daily. (Patient taking differently: Take 600 mg by mouth 2 (two) times daily.)    hydrALAZINE (APRESOLINE) 100 MG tablet TAKE 1 TABLET BY MOUTH EVERY 8 HOURS    hydroCHLOROthiazide (HYDRODIURIL) 50 MG tablet Take 1 tablet (50 mg total) by mouth once daily.    hydrOXYzine HCL (ATARAX) 25 MG tablet Take 1 tablet (25 mg total) by mouth every evening.    letrozole (FEMARA) 2.5 mg Tab Take 1 tablet (2.5 mg total) by mouth once daily.    LIDOcaine (LIDODERM) 5 % Place 2 patches onto the skin once daily. Remove & Discard patch within 12 hours or as directed by MD    multivitamin (THERAGRAN) per tablet Take 1 tablet by mouth once daily.    propranoloL (INDERAL) 40 MG tablet Take 1 tablet by mouth twice daily    telmisartan (MICARDIS) 80 MG Tab Take 1 tablet (80 mg total) by mouth once daily.    venlafaxine (EFFEXOR-XR) 75 MG 24 hr capsule Take 1 capsule (75 mg total) by mouth once daily.    zolpidem (AMBIEN) 5 MG Tab Take 1 tablet (5 mg total) by mouth nightly as needed (sleep).    [DISCONTINUED] ALPRAZolam (XANAX) 0.5 MG tablet Take 1 tablet (0.5 mg total) by mouth 2 (two) times daily as needed for Insomnia or Anxiety.    [DISCONTINUED] butalbital-acetaminophen-caff -40 mg -40 mg Cap Take 1 capsule by mouth 2 (two) times daily as needed (headache).    [DISCONTINUED] aspirin (ECOTRIN) 81 MG EC tablet Take 1 tablet (81 mg total) by mouth once daily.    [DISCONTINUED] bismuth subsalicylate (PEPTO BISMOL) 262 mg/15 mL suspension Take 15 mLs by mouth every 6 (six) hours as needed for Indigestion.    [DISCONTINUED] blood pressure kit med and lrg Kit Take blood pressure first thing in the morning.    [DISCONTINUED] blood pressure kit med and lrg Kit Check blood pressure every morning    [DISCONTINUED] blood pressure  kit-extra large Kit     [DISCONTINUED] butalbital-acetaminophen-caff -40 mg -40 mg Cap Take 1 capsule by mouth 2 (two) times daily as needed (headache).    [DISCONTINUED] cloNIDine (CATAPRES) 0.1 MG tablet Take 1 tablet (0.1 mg total) by mouth 2 (two) times daily.    [DISCONTINUED] duloxetine HCl (INV DULOXETINE/PLACEBO) capsule Take 1 capsule (30 mg total) by mouth once daily.    [DISCONTINUED] inhalation spacing device (COMPACT SPACE CHAMBER) Use as directed for inhalation.    [DISCONTINUED] NARCAN 4 mg/actuation [place] 1CALL 911. ADMINISTER A SINGLE SPRAY INTRANASALLY INTO ONE NOSTRIL UPON SIGNS OF OPIOID OVERDOSE. MAY REPEAT AFTER 3 MINUTES IF NO RESPONSE.    [DISCONTINUED] oxyCODONE-acetaminophen (PERCOCET) 7.5-325 mg per tablet Take 1 tablet by mouth every 4 (four) hours as needed for Pain.     Family History     Problem Relation (Age of Onset)    Diabetes Maternal Grandmother, Maternal Grandfather, Mother    Sickle cell anemia Daughter        Tobacco Use    Smoking status: Never Smoker    Smokeless tobacco: Never Used   Substance and Sexual Activity    Alcohol use: No    Drug use: No    Sexual activity: Yes     Partners: Male     Birth control/protection: Post-menopausal     Review of Systems   Constitutional: Negative.  Negative for chills, fatigue and fever.   HENT: Negative.  Negative for congestion and nosebleeds.    Eyes: Negative.    Respiratory: Positive for chest tightness and shortness of breath. Negative for cough and wheezing.    Cardiovascular: Positive for chest pain. Negative for palpitations.   Gastrointestinal: Negative.  Negative for abdominal pain, diarrhea, nausea and vomiting.   Endocrine: Negative.    Genitourinary: Negative.  Negative for dysuria.   Musculoskeletal: Negative.  Negative for back pain.   Skin: Negative.  Negative for color change and rash.   Allergic/Immunologic: Positive for immunocompromised state.   Neurological: Positive for dizziness. Negative for  syncope, speech difficulty and headaches.   Hematological: Negative.    Psychiatric/Behavioral: Negative.  Negative for confusion, decreased concentration and hallucinations. The patient is not nervous/anxious.    All other systems reviewed and are negative.    Objective:     Vital Signs (Most Recent):  Temp: 98 °F (36.7 °C) (02/15/22 2133)  Pulse: (!) 54 (02/15/22 2133)  Resp: 20 (02/15/22 2133)  BP: (!) 150/75 (02/15/22 2133)  SpO2: 100 % (02/15/22 2133) Vital Signs (24h Range):  Temp:  [97.7 °F (36.5 °C)-98.7 °F (37.1 °C)] 98 °F (36.7 °C)  Pulse:  [52-63] 54  Resp:  [16-20] 20  SpO2:  [98 %-100 %] 100 %  BP: ()/(54-94) 150/75     Weight: 135.2 kg (298 lb 1 oz)  Body mass index is 43.38 kg/m².    Physical Exam  Vitals and nursing note reviewed.   Constitutional:       General: She is awake. She is not in acute distress.     Appearance: Normal appearance. She is morbidly obese.   HENT:      Head: Normocephalic and atraumatic.      Mouth/Throat:      Mouth: Mucous membranes are moist.   Eyes:      General: No scleral icterus.     Conjunctiva/sclera: Conjunctivae normal.   Cardiovascular:      Rate and Rhythm: Regular rhythm. Bradycardia present.      Heart sounds: Normal heart sounds. No murmur heard.      Pulmonary:      Effort: Pulmonary effort is normal. No respiratory distress.      Breath sounds: Normal breath sounds. No rhonchi.      Comments: Currently on 2 L O2 N/C.  Abdominal:      Palpations: Abdomen is soft.      Tenderness: There is no abdominal tenderness.   Musculoskeletal:         General: No swelling. Normal range of motion.      Cervical back: Normal range of motion.   Skin:     General: Skin is warm and dry.      Findings: No erythema.   Neurological:      General: No focal deficit present.      Mental Status: She is alert and oriented to person, place, and time. Mental status is at baseline.      Motor: No abnormal muscle tone.   Psychiatric:         Mood and Affect: Mood normal.          Behavior: Behavior normal. Behavior is cooperative.         Thought Content: Thought content normal.             Significant Labs:   Results for orders placed or performed during the hospital encounter of 02/15/22   CBC Auto Differential   Result Value Ref Range    WBC 8.75 3.90 - 12.70 K/uL    RBC 4.17 4.00 - 5.40 M/uL    Hemoglobin 10.3 (L) 12.0 - 16.0 g/dL    Hematocrit 33.5 (L) 37.0 - 48.5 %    MCV 80 (L) 82 - 98 fL    MCH 24.7 (L) 27.0 - 31.0 pg    MCHC 30.7 (L) 32.0 - 36.0 g/dL    RDW 15.6 (H) 11.5 - 14.5 %    Platelets 238 150 - 450 K/uL    MPV 9.7 9.2 - 12.9 fL    Immature Granulocytes 0.3 0.0 - 0.5 %    Gran # (ANC) 5.7 1.8 - 7.7 K/uL    Immature Grans (Abs) 0.03 0.00 - 0.04 K/uL    Lymph # 2.1 1.0 - 4.8 K/uL    Mono # 0.7 0.3 - 1.0 K/uL    Eos # 0.2 0.0 - 0.5 K/uL    Baso # 0.07 0.00 - 0.20 K/uL    nRBC 0 0 /100 WBC    Gran % 65.1 38.0 - 73.0 %    Lymph % 24.1 18.0 - 48.0 %    Mono % 7.9 4.0 - 15.0 %    Eosinophil % 1.8 0.0 - 8.0 %    Basophil % 0.8 0.0 - 1.9 %    Differential Method Automated    Comprehensive Metabolic Panel   Result Value Ref Range    Sodium 140 136 - 145 mmol/L    Potassium 4.3 3.5 - 5.1 mmol/L    Chloride 107 95 - 110 mmol/L    CO2 25 23 - 29 mmol/L    Glucose 110 70 - 110 mg/dL    BUN 24 (H) 8 - 23 mg/dL    Creatinine 1.7 (H) 0.5 - 1.4 mg/dL    Calcium 9.0 8.7 - 10.5 mg/dL    Total Protein 6.8 6.0 - 8.4 g/dL    Albumin 2.9 (L) 3.5 - 5.2 g/dL    Total Bilirubin 0.2 0.1 - 1.0 mg/dL    Alkaline Phosphatase 111 55 - 135 U/L    AST 10 10 - 40 U/L    ALT 8 (L) 10 - 44 U/L    Anion Gap 8 8 - 16 mmol/L    eGFR if African American 35 (A) >60 mL/min/1.73 m^2    eGFR if non African American 30 (A) >60 mL/min/1.73 m^2   Troponin I   Result Value Ref Range    Troponin I 0.008 0.000 - 0.026 ng/mL   BNP   Result Value Ref Range     (H) 0 - 99 pg/mL   D-Dimer, Quantitative   Result Value Ref Range    D-Dimer 0.63 (H) <0.50 mg/L FEU   APTT   Result Value Ref Range    aPTT 27.8 21.0 - 32.0 sec    Protime-INR   Result Value Ref Range    Prothrombin Time 10.6 9.0 - 12.5 sec    INR 0.9 0.8 - 1.2   COVID-19 Rapid Screening   Result Value Ref Range    SARS-CoV-2 RNA, Amplification, Qual Negative Negative     *Note: Due to a large number of results and/or encounters for the requested time period, some results have not been displayed. A complete set of results can be found in Results Review.         Significant Imaging:   Imaging Results          NM Lung Scan Ventilation Perfusion (Final result)  Result time 02/15/22 16:33:46    Final result by Sander Gregory MD (02/15/22 16:33:46)                 Impression:      1.  Indeterminate V/Q scan.  There is a large matched defect within the inferior right lung that does not appear to correspond to an abnormality on the chest x-ray.  No other perfusion defects noted.      Electronically signed by: Sander Gregory MD  Date:    02/15/2022  Time:    16:33             Narrative:    EXAMINATION:  NM LUNG VENTILATION AND PERFUSION IMAGING    CLINICAL HISTORY:  chest pain c hx PE;    TECHNIQUE:  The patient received 1 mCi of technetium 99 DTPA for ventilation images and 6.2 mCi of technetium 99 MAA for perfusion images.    COMPARISON:  Chest x-ray performed earlier the same day    FINDINGS:  The ventilation images demonstrate mainly central deposition of the radiopharmaceutical.  There is some decrease uptake in the inferior right lung as compared to the rest of the lungs.    Perfusion images demonstrate similar decreased uptake within the inferior right lung, matched with the ventilation defect.  The distribution of radiopharmaceutical is otherwise normal.                               US Lower Extremity Veins Bilateral (Final result)  Result time 02/15/22 13:49:36    Final result by Sander Gregory MD (02/15/22 13:49:36)                 Impression:      1.  No evidence of right or left deep venous thrombosis.      Electronically signed by: Sander Gregory  MD  Date:    02/15/2022  Time:    13:49             Narrative:    EXAMINATION:  US LOWER EXTREMITY VEINS BILATERAL    CLINICAL HISTORY:  Other specified soft tissue disorders    TECHNIQUE:  Real-time, color, and duplex evaluation of the deep venous vessels in both lower extremities were performed.    COMPARISON:  No comparison studies are available.    FINDINGS:  No evidence of deep venous thrombosis in either lower extremity. The deep venous vessels demonstrate normal spontaneous and augmented flow with normal respiratory variation.  Deep venous vessels compress in a normal fashion throughout.                               X-Ray Chest AP Portable (Final result)  Result time 02/15/22 12:33:52    Final result by Sander Gregory MD (02/15/22 12:33:52)                 Impression:      1.  Negative for acute process involving the chest, considering there are markedly low lung volumes on today's study.    2.  Stable and incidental findings as noted above.      Electronically signed by: Sander Gregory MD  Date:    02/15/2022  Time:    12:33             Narrative:    EXAMINATION:  XR CHEST AP PORTABLE    CLINICAL HISTORY:  chest pain;    COMPARISON:  [facility] [facility], 2019    FINDINGS:  EKG leads overlie the chest.  Moderately low lung volumes on the current study.  The lungs are otherwise clear.  The cardiac silhouette size is enlarged.  The trachea is midline and the mediastinal width is normal. Negative for focal infiltrate, effusion or pneumothorax. Pulmonary vasculature is normal. Negative for osseous abnormalities. Tortuous aorta.  Degenerative changes of the spine and both shoulder girdles.    Interval clips placed in the right axillary region.  There appears to be dense contrast within the left upper quadrant bowel.                                I have independently reviewed and interpreted the EKG.     I have independently reviewed all pertinent labs within the past 24 hours.    I have independently reviewed, visualized and  interpreted all pertinent imaging results within the past 24 hours and discussed the findings with the ED physician, Dr. Muhammad            Assessment/Plan:     * Acute pulmonary embolism  Unable to obtain CTA chest due to elevated creatinine 1.7.  V/Q scan intermediate with large defect.  Started on heparin drip in the ED.  Transition to oral anticoagulation in 1-2 days.  Monitor on telemetry.  Hemodynamically stable.  Echocardiogram in a.m..        Acute renal failure superimposed on stage 3 chronic kidney disease  Creatinine currently elevated 1.7 (baseline 1.1-1.4).  Avoid nephrotoxic agents, including contrast for CT.  Normal saline at 100 cc/hour for the next 24 hours.  Repeat labs in a.m..  Monitor urine output.      Malignant neoplasm of upper-outer quadrant of right breast in female, estrogen receptor positive  Continue home dose Femara  Followed by Dr. Boles in the Oncology Clinic.      Chronic diastolic congestive heart failure  Patient is identified as having Diastolic (HFpEF) heart failure that is Chronic. CHF is currently controlled. Latest ECHO performed and demonstrates- No results found for this or any previous visit.  . Continue Furosemide and monitor clinical status closely. Monitor on telemetry. Patient is off CHF pathway.  Monitor strict Is&Os and daily weights.  Place on fluid restriction of 2 L. Continue to stress to patient importance of self efficacy and  on diet for CHF. Last BNP reviewed- and noted below   Recent Labs   Lab 02/15/22  1221   *   .Hold Lasix for now due to worsening renal function.    Morbid obesity with BMI of 45.0-49.9, adult  BMI 43.3    Hypertension  Continue home dose antihypertensives.  Hold diuretics due to worsening renal function.      VTE Risk Mitigation (From admission, onward)         Ordered     heparin 25,000 units in dextrose 5% (100 units/ml) IV bolus from bag - ADDITIONAL PRN BOLUS - 60 units/kg  As needed (PRN)        Question:  Heparin  Infusion Adjustment (DO NOT MODIFY ANSWER)  Answer:  [email or web address]\epic\Images\Pharmacy\HeparinInfusions\heparin HIGH INTENSITY nomogram for [facility] [id number]    02/15/22 2034     heparin 25,000 units in dextrose 5% (100 units/ml) IV bolus from bag - ADDITIONAL PRN BOLUS - 30 units/kg  As needed (PRN)        Question:  Heparin Infusion Adjustment (DO NOT MODIFY ANSWER)  Answer:  [email or web address]\epic\Images\Pharmacy\HeparinInfusions\heparin HIGH INTENSITY nomogram for [facility] [id number]    02/15/22 2034     heparin 25,000 units in dextrose 5% 250 mL (100 units/mL) infusion HIGH INTENSITY nomogram - [facility]  Continuous        Question Answer Comment   Heparin Infusion Adjustment (DO NOT MODIFY ANSWER) [email or web address]\epic\Images\Pharmacy\HeparinInfusions\heparin HIGH INTENSITY nomogram for [facility] [id number]    Begin at (in units/kg/hr) 18        02/15/22 2034                 The patient is placed in OBSERVATION status.      Zeb Watson MD  Department of Hospital Medicine   [facility]

## 2022-02-16 NOTE — HPI
Ms. Luther is a 72-year-old  female with PMH significant for PE/DVT in the past, stopped anticoagulation at the time of AAA repair last year, diastolic CHF, HTN, breast cancer currently on Femara, presented to the ED complaining of shortness of breath and chest discomfort for the past 1-2 weeks associated with worsened exertional dyspnea.  Non home oxygen dependent.  Also complaining of bilateral lower extremity pain.  D-dimer mildly elevated 0.63.  Ultrasound lower extremities negative for DVT.  Unable to obtain CT PE protocol due to elevated creatinine of 1.7 (baseline 1.1-1.4).  V/Q scan shows intermediate in probability with large matched defect within the inferior right lung.  Started on heparin drip.  HR in the 50s.  /75.    Admitting diagnosis:  Acute PE.

## 2022-02-16 NOTE — HOSPITAL COURSE
2/16: Admitted for PE, started on heparin drip and will transition to OAC in AM. Pt resting comfortably and stable with c/o right lateral breast pain that is sensitive to touch  2/17: NAEO, headache this AM, not feeling well c/o pins and needles sensation down left side. Started on Eliquis

## 2022-02-16 NOTE — NURSING
Pt reported unwitnessed fall. Pt stated she got up to go to the restroom after repeatedly being told not to get out of bed without assistance.Pt complains of pain to right hand. Pt also states she doesn't need a xray. MD notified of finding. No new orders received.

## 2022-02-16 NOTE — PLAN OF CARE
Patient AAOX 4. VSS.. Patient remained afebrile throughout shift.   IV heparin and fluids  administered per order   Patient remained free of falls this shift   Patient denies c/o pain this shift   . Plan of care reviewed. Patient verbalized understanding.   Patient moving/turning independently.   Frequent weight shifting encouraged.   Patient SR on monitor   Bed low, side rails up x2, wheels locked, call light in reach. Bed alarm maintained for safety. Patient instructed to call for assistance.   Hourly rounding completed.   Will continue to monitor.

## 2022-02-16 NOTE — SUBJECTIVE & OBJECTIVE
Past Medical History:   Diagnosis Date    Chronic diastolic congestive heart failure 2020    Encounter for blood transfusion     History of repair of aneurysm of abdominal aorta using endovascular stent graft     DR Bonds     of psychiatric care     Hypertension     Major depressive disorder, single episode, moderate with anxious distress 2020    Malignant neoplasm of upper-outer quadrant of right breast in female, estrogen receptor positive 3/14/2019    radiation    Psychiatric problem     Sleep apnea     Sleep difficulties     Stroke 2009    no residual defect    Therapy        Past Surgical History:   Procedure Laterality Date    APPENDECTOMY      AXILLARY NODE DISSECTION Right 2020    Procedure: LYMPHADENECTOMY, AXILLARY;  Surgeon: Artemio Starks MD;  Location: [facility];  Service: General;  Laterality: Right;    BIOPSY OF AXILLARY NODE Right 3/2/2021    Procedure: BIOPSY, LYMPH NODE, AXILLARY;  Surgeon: Artemio Sutton MD;  Location: [address];  Service: General;  Laterality: Right;    BREAST BIOPSY          BREAST LUMPECTOMY      2019     SECTION      x 1    OOPHORECTOMY      right     SENTINEL LYMPH NODE BIOPSY Right 3/27/2019    Procedure: BIOPSY, LYMPH NODE, SENTINEL;  Surgeon: Artemio Starks MD;  Location: [facility];  Service: General;  Laterality: Right;       Review of patient's allergies indicates:   Allergen Reactions    Pcn [penicillins] Hives       No current facility-administered medications on file prior to encounter.     Current Outpatient Medications on File Prior to Encounter   Medication Sig    albuterol (VENTOLIN HFA) 90 mcg/actuation inhaler Inhale 2 puffs into the lungs every 4 (four) hours as needed for Shortness of Breath.    ALPRAZolam (XANAX) 0.5 MG tablet Take 1 tablet (0.5 mg total) by mouth 2 (two) times daily as needed for Insomnia or Anxiety.    amLODIPine (NORVASC) 10 MG tablet Take 1 tablet (10 mg total) by mouth  once daily.    aspirin (ECOTRIN) 81 MG EC tablet Take 81 mg by mouth once daily.    atorvastatin (LIPITOR) 40 MG tablet Take 1 tablet (40 mg total) by mouth once daily.    cholecalciferol, vitamin D3, 1,250 mcg (50,000 unit) capsule Take 1 capsule (50,000 Units total) by mouth once a week. (Patient taking differently: Take 50,000 Units by mouth once a week. on Friday)    cloNIDine (CATAPRES) 0.1 MG tablet Take 1 tablet (0.1 mg total) by mouth 3 (three) times daily.    doxazosin (CARDURA) 4 MG tablet Take 1 tablet (4 mg total) by mouth every evening.    furosemide (LASIX) 20 MG tablet take 1/2 tablet by mouth once a day.    gabapentin (NEURONTIN) 600 MG tablet Take 1 tablet (600 mg total) by mouth 3 (three) times daily. (Patient taking differently: Take 600 mg by mouth 2 (two) times daily.)    hydrALAZINE (APRESOLINE) 100 MG tablet TAKE 1 TABLET BY MOUTH EVERY 8 HOURS    hydroCHLOROthiazide (HYDRODIURIL) 50 MG tablet Take 1 tablet (50 mg total) by mouth once daily.    hydrOXYzine HCL (ATARAX) 25 MG tablet Take 1 tablet (25 mg total) by mouth every evening.    letrozole (FEMARA) 2.5 mg Tab Take 1 tablet (2.5 mg total) by mouth once daily.    LIDOcaine (LIDODERM) 5 % Place 2 patches onto the skin once daily. Remove & Discard patch within 12 hours or as directed by MD    multivitamin (THERAGRAN) per tablet Take 1 tablet by mouth once daily.    propranoloL (INDERAL) 40 MG tablet Take 1 tablet by mouth twice daily    telmisartan (MICARDIS) 80 MG Tab Take 1 tablet (80 mg total) by mouth once daily.    venlafaxine (EFFEXOR-XR) 75 MG 24 hr capsule Take 1 capsule (75 mg total) by mouth once daily.    zolpidem (AMBIEN) 5 MG Tab Take 1 tablet (5 mg total) by mouth nightly as needed (sleep).    [DISCONTINUED] ALPRAZolam (XANAX) 0.5 MG tablet Take 1 tablet (0.5 mg total) by mouth 2 (two) times daily as needed for Insomnia or Anxiety.    [DISCONTINUED] butalbital-acetaminophen-caff -40 mg -40 mg Cap  Take 1 capsule by mouth 2 (two) times daily as needed (headache).    [DISCONTINUED] aspirin (ECOTRIN) 81 MG EC tablet Take 1 tablet (81 mg total) by mouth once daily.    [DISCONTINUED] bismuth subsalicylate (PEPTO BISMOL) 262 mg/15 mL suspension Take 15 mLs by mouth every 6 (six) hours as needed for Indigestion.    [DISCONTINUED] blood pressure kit med and lrg Kit Take blood pressure first thing in the morning.    [DISCONTINUED] blood pressure kit med and lrg Kit Check blood pressure every morning    [DISCONTINUED] blood pressure kit-extra large Kit     [DISCONTINUED] butalbital-acetaminophen-caff -40 mg -40 mg Cap Take 1 capsule by mouth 2 (two) times daily as needed (headache).    [DISCONTINUED] cloNIDine (CATAPRES) 0.1 MG tablet Take 1 tablet (0.1 mg total) by mouth 2 (two) times daily.    [DISCONTINUED] duloxetine HCl (INV DULOXETINE/PLACEBO) capsule Take 1 capsule (30 mg total) by mouth once daily.    [DISCONTINUED] inhalation spacing device (COMPACT SPACE CHAMBER) Use as directed for inhalation.    [DISCONTINUED] NARCAN 4 mg/actuation [place] 1CALL [phone]. ADMINISTER A SINGLE SPRAY INTRANASALLY INTO ONE NOSTRIL UPON SIGNS OF OPIOID OVERDOSE. MAY REPEAT AFTER 3 MINUTES IF NO RESPONSE.    [DISCONTINUED] oxyCODONE-acetaminophen (PERCOCET) 7.5-325 mg per tablet Take 1 tablet by mouth every 4 (four) hours as needed for Pain.     Family History     Problem Relation (Age of Onset)    Diabetes Maternal Grandmother, Maternal Grandfather, Mother    Sickle cell anemia Daughter        Tobacco Use    Smoking status: Never Smoker    Smokeless tobacco: Never Used   Substance and Sexual Activity    Alcohol use: No    Drug use: No    Sexual activity: Yes     Partners: Male     Birth control/protection: Post-menopausal     Review of Systems   Constitutional: Negative.  Negative for chills, fatigue and fever.   HENT: Negative.  Negative for congestion and nosebleeds.    Eyes: Negative.    Respiratory:  Positive for chest tightness and shortness of breath. Negative for cough and wheezing.    Cardiovascular: Positive for chest pain. Negative for palpitations.   Gastrointestinal: Negative.  Negative for abdominal pain, diarrhea, nausea and vomiting.   Endocrine: Negative.    Genitourinary: Negative.  Negative for dysuria.   Musculoskeletal: Negative.  Negative for back pain.   Skin: Negative.  Negative for color change and rash.   Allergic/Immunologic: Positive for immunocompromised state.   Neurological: Positive for dizziness. Negative for syncope, speech difficulty and headaches.   Hematological: Negative.    Psychiatric/Behavioral: Negative.  Negative for confusion, decreased concentration and hallucinations. The patient is not nervous/anxious.    All other systems reviewed and are negative.    Objective:     Vital Signs (Most Recent):  Temp: 98 °F (36.7 °C) (02/15/22 2133)  Pulse: (!) 54 (02/15/22 2133)  Resp: 20 (02/15/22 2133)  BP: (!) 150/75 (02/15/22 2133)  SpO2: 100 % (02/15/22 2133) Vital Signs (24h Range):  Temp:  [97.7 °F (36.5 °C)-98.7 °F (37.1 °C)] 98 °F (36.7 °C)  Pulse:  [52-63] 54  Resp:  [16-20] 20  SpO2:  [98 %-100 %] 100 %  BP: ()/(54-94) 150/75     Weight: 135.2 kg (298 lb 1 oz)  Body mass index is 43.38 kg/m².    Physical Exam  Vitals and nursing note reviewed.   Constitutional:       General: She is awake. She is not in acute distress.     Appearance: Normal appearance. She is morbidly obese.   HENT:      Head: Normocephalic and atraumatic.      Mouth/Throat:      Mouth: Mucous membranes are moist.   Eyes:      General: No scleral icterus.     Conjunctiva/sclera: Conjunctivae normal.   Cardiovascular:      Rate and Rhythm: Regular rhythm. Bradycardia present.      Heart sounds: Normal heart sounds. No murmur heard.      Pulmonary:      Effort: Pulmonary effort is normal. No respiratory distress.      Breath sounds: Normal breath sounds. No rhonchi.      Comments: Currently on 2 L O2  N/C.  Abdominal:      Palpations: Abdomen is soft.      Tenderness: There is no abdominal tenderness.   Musculoskeletal:         General: No swelling. Normal range of motion.      Cervical back: Normal range of motion.   Skin:     General: Skin is warm and dry.      Findings: No erythema.   Neurological:      General: No focal deficit present.      Mental Status: She is alert and oriented to person, place, and time. Mental status is at baseline.      Motor: No abnormal muscle tone.   Psychiatric:         Mood and Affect: Mood normal.         Behavior: Behavior normal. Behavior is cooperative.         Thought Content: Thought content normal.             Significant Labs:   Results for orders placed or performed during the hospital encounter of 02/15/22   CBC Auto Differential   Result Value Ref Range    WBC 8.75 3.90 - 12.70 K/uL    RBC 4.17 4.00 - 5.40 M/uL    Hemoglobin 10.3 (L) 12.0 - 16.0 g/dL    Hematocrit 33.5 (L) 37.0 - 48.5 %    MCV 80 (L) 82 - 98 fL    MCH 24.7 (L) 27.0 - 31.0 pg    MCHC 30.7 (L) 32.0 - 36.0 g/dL    RDW 15.6 (H) 11.5 - 14.5 %    Platelets 238 150 - 450 K/uL    MPV 9.7 9.2 - 12.9 fL    Immature Granulocytes 0.3 0.0 - 0.5 %    Gran # (ANC) 5.7 1.8 - 7.7 K/uL    Immature Grans (Abs) 0.03 0.00 - 0.04 K/uL    Lymph # 2.1 1.0 - 4.8 K/uL    Mono # 0.7 0.3 - 1.0 K/uL    Eos # 0.2 0.0 - 0.5 K/uL    Baso # 0.07 0.00 - 0.20 K/uL    nRBC 0 0 /100 WBC    Gran % 65.1 38.0 - 73.0 %    Lymph % 24.1 18.0 - 48.0 %    Mono % 7.9 4.0 - 15.0 %    Eosinophil % 1.8 0.0 - 8.0 %    Basophil % 0.8 0.0 - 1.9 %    Differential Method Automated    Comprehensive Metabolic Panel   Result Value Ref Range    Sodium 140 136 - 145 mmol/L    Potassium 4.3 3.5 - 5.1 mmol/L    Chloride 107 95 - 110 mmol/L    CO2 25 23 - 29 mmol/L    Glucose 110 70 - 110 mg/dL    BUN 24 (H) 8 - 23 mg/dL    Creatinine 1.7 (H) 0.5 - 1.4 mg/dL    Calcium 9.0 8.7 - 10.5 mg/dL    Total Protein 6.8 6.0 - 8.4 g/dL    Albumin 2.9 (L) 3.5 - 5.2 g/dL     Total Bilirubin 0.2 0.1 - 1.0 mg/dL    Alkaline Phosphatase 111 55 - 135 U/L    AST 10 10 - 40 U/L    ALT 8 (L) 10 - 44 U/L    Anion Gap 8 8 - 16 mmol/L    eGFR if African American 35 (A) >60 mL/min/1.73 m^2    eGFR if non African American 30 (A) >60 mL/min/1.73 m^2   Troponin I   Result Value Ref Range    Troponin I 0.008 0.000 - 0.026 ng/mL   BNP   Result Value Ref Range     (H) 0 - 99 pg/mL   D-Dimer, Quantitative   Result Value Ref Range    D-Dimer 0.63 (H) <0.50 mg/L FEU   APTT   Result Value Ref Range    aPTT 27.8 21.0 - 32.0 sec   Protime-INR   Result Value Ref Range    Prothrombin Time 10.6 9.0 - 12.5 sec    INR 0.9 0.8 - 1.2   COVID-19 Rapid Screening   Result Value Ref Range    SARS-CoV-2 RNA, Amplification, Qual Negative Negative     *Note: Due to a large number of results and/or encounters for the requested time period, some results have not been displayed. A complete set of results can be found in Results Review.         Significant Imaging:   Imaging Results          NM Lung Scan Ventilation Perfusion (Final result)  Result time 02/15/22 16:33:46    Final result by Sander Gregory MD (02/15/22 16:33:46)                 Impression:      1.  Indeterminate V/Q scan.  There is a large matched defect within the inferior right lung that does not appear to correspond to an abnormality on the chest x-ray.  No other perfusion defects noted.      Electronically signed by: Sander Gregory MD  Date:    02/15/2022  Time:    16:33             Narrative:    EXAMINATION:  NM LUNG VENTILATION AND PERFUSION IMAGING    CLINICAL HISTORY:  chest pain c hx PE;    TECHNIQUE:  The patient received 1 mCi of technetium 99 DTPA for ventilation images and 6.2 mCi of technetium 99 MAA for perfusion images.    COMPARISON:  Chest x-ray performed earlier the same day    FINDINGS:  The ventilation images demonstrate mainly central deposition of the radiopharmaceutical.  There is some decrease uptake in the inferior right lung as  compared to the rest of the lungs.    Perfusion images demonstrate similar decreased uptake within the inferior right lung, matched with the ventilation defect.  The distribution of radiopharmaceutical is otherwise normal.                               US Lower Extremity Veins Bilateral (Final result)  Result time 02/15/22 13:49:36    Final result by Sander Gregory MD (02/15/22 13:49:36)                 Impression:      1.  No evidence of right or left deep venous thrombosis.      Electronically signed by: Sander Gregory MD  Date:    02/15/2022  Time:    13:49             Narrative:    EXAMINATION:  US LOWER EXTREMITY VEINS BILATERAL    CLINICAL HISTORY:  Other specified soft tissue disorders    TECHNIQUE:  Real-time, color, and duplex evaluation of the deep venous vessels in both lower extremities were performed.    COMPARISON:  No comparison studies are available.    FINDINGS:  No evidence of deep venous thrombosis in either lower extremity. The deep venous vessels demonstrate normal spontaneous and augmented flow with normal respiratory variation.  Deep venous vessels compress in a normal fashion throughout.                               X-Ray Chest AP Portable (Final result)  Result time 02/15/22 12:33:52    Final result by Sander Gregory MD (02/15/22 12:33:52)                 Impression:      1.  Negative for acute process involving the chest, considering there are markedly low lung volumes on today's study.    2.  Stable and incidental findings as noted above.      Electronically signed by: Sander Gregory MD  Date:    02/15/2022  Time:    12:33             Narrative:    EXAMINATION:  XR CHEST AP PORTABLE    CLINICAL HISTORY:  chest pain;    COMPARISON:  [facility], 2019    FINDINGS:  EKG leads overlie the chest.  Moderately low lung volumes on the current study.  The lungs are otherwise clear.  The cardiac silhouette size is enlarged.  The trachea is midline and the mediastinal width is normal. Negative for focal  infiltrate, effusion or pneumothorax. Pulmonary vasculature is normal. Negative for osseous abnormalities. Tortuous aorta.  Degenerative changes of the spine and both shoulder girdles.    Interval clips placed in the right axillary region.  There appears to be dense contrast within the left upper quadrant bowel.                                I have independently reviewed and interpreted the EKG.     I have independently reviewed all pertinent labs within the past 24 hours.    I have independently reviewed, visualized and interpreted all pertinent imaging results within the past 24 hours and discussed the findings with the ED physician, Dr. Muhammad

## 2022-02-16 NOTE — NURSING
Received report from ELZA Mejia (ED) Pt transferred from stretcher to wheelchair with walker brought from home. VSS, monitor placed and verified with monitor room, no further questions or complaints at this time.

## 2022-02-16 NOTE — PROGRESS NOTES
[facility]  [facility] Medicine  Progress Note    Patient Name: Susu Luther  MRN: [id number]  Patient Class: OP- Observation   Admission Date: 2/15/2022  Length of Stay: 0 days  Attending Physician: Anatoliy Jacinto MD  Primary Care Provider: Peace Arias MD        Subjective:     Principal Problem:Acute pulmonary embolism      HPI:  Ms. Luther is a 72-year-old  female with PMH significant for PE/DVT in the past, stopped anticoagulation at the time of AAA repair last year, diastolic CHF, HTN, breast cancer currently on Femara, presented to the ED complaining of shortness of breath and chest discomfort for the past 1-2 weeks associated with worsened exertional dyspnea.  Non home oxygen dependent.  Also complaining of bilateral lower extremity pain.  D-dimer mildly elevated 0.63.  Ultrasound lower extremities negative for DVT.  Unable to obtain CT PE protocol due to elevated creatinine of 1.7 (baseline 1.1-1.4).  V/Q scan shows intermediate in probability with large matched defect within the inferior right lung.  Started on heparin drip.  HR in the 50s.  /75.    Admitting diagnosis:  Acute PE.      Overview/Hospital Course:  2/16: Admitted for PE, started on heparin drip and will transition to OAC in AM. Pt resting comfortably and stable with c/o right lateral breast pain that is sensitive to touch      Review of Systems   Constitutional: Negative.  Negative for chills, fatigue and fever.   HENT: Negative.  Negative for congestion and nosebleeds.    Eyes: Negative.    Respiratory: Positive for chest tightness and shortness of breath. Negative for cough and wheezing.    Cardiovascular: Positive for chest pain. Negative for palpitations.   Gastrointestinal: Negative.  Negative for abdominal pain, diarrhea, nausea and vomiting.   Endocrine: Negative.    Genitourinary: Negative.  Negative for dysuria.   Musculoskeletal: Negative.  Negative for back pain.   Skin: Negative.  Negative  for color change and rash.   Allergic/Immunologic: Positive for immunocompromised state.   Neurological: Positive for dizziness. Negative for syncope, speech difficulty and headaches.   Hematological: Negative.    Psychiatric/Behavioral: Negative.  Negative for confusion, decreased concentration and hallucinations. The patient is not nervous/anxious.    All other systems reviewed and are negative.    Objective:     Vital Signs (Most Recent):  Temp: 97.6 °F (36.4 °C) (02/16/22 1327)  Pulse: 92 (02/16/22 1328)  Resp: 18 (02/16/22 1404)  BP: (!) 157/81 (02/16/22 1328)  SpO2: 97 % (02/16/22 1327) Vital Signs (24h Range):  Temp:  [97.5 °F (36.4 °C)-98.5 °F (36.9 °C)] 97.6 °F (36.4 °C)  Pulse:  [52-95] 92  Resp:  [16-20] 18  SpO2:  [94 %-100 %] 97 %  BP: (123-165)/(62-94) 157/81     Weight: (!) 146.1 kg (322 lb)  Body mass index is 47.55 kg/m².    Intake/Output Summary (Last 24 hours) at 2/16/2022 1518  Last data filed at 2/16/2022 1000  Gross per 24 hour   Intake 937.71 ml   Output --   Net 937.71 ml      Physical Exam  Vitals and nursing note reviewed.   Constitutional:       General: She is awake. She is not in acute distress.     Appearance: Normal appearance. She is morbidly obese.   HENT:      Head: Normocephalic and atraumatic.      Mouth/Throat:      Mouth: Mucous membranes are moist.   Eyes:      General: No scleral icterus.     Conjunctiva/sclera: Conjunctivae normal.   Cardiovascular:      Rate and Rhythm: Normal rate and regular rhythm.      Heart sounds: Normal heart sounds. No murmur heard.      Pulmonary:      Effort: Pulmonary effort is normal. No respiratory distress.      Breath sounds: Normal breath sounds. No rhonchi.      Comments: PRANAV  Abdominal:      Palpations: Abdomen is soft.      Tenderness: There is no abdominal tenderness.   Musculoskeletal:         General: No swelling. Normal range of motion.      Cervical back: Normal range of motion.   Skin:     General: Skin is warm and dry.       Findings: No erythema.      Comments: Right lateral breast knot/pain to touch   Neurological:      General: No focal deficit present.      Mental Status: She is alert and oriented to person, place, and time. Mental status is at baseline.      Motor: No abnormal muscle tone.   Psychiatric:         Mood and Affect: Mood normal.         Behavior: Behavior normal. Behavior is cooperative.         Thought Content: Thought content normal.         Significant Labs:   All pertinent labs within the past 24 hours have been reviewed.  CBC:   Recent Labs   Lab 02/15/22  1221 02/16/22  0612   WBC 8.75 7.97   HGB 10.3* 10.3*   HCT 33.5* 32.8*    200     CMP:   Recent Labs   Lab 02/15/22  1221 02/16/22  0612    141   K 4.3 3.9    111*   CO2 25 22*    104   BUN 24* 24*   CREATININE 1.7* 1.5*   CALCIUM 9.0 8.6*   PROT 6.8 6.2   ALBUMIN 2.9* 2.7*   BILITOT 0.2 0.3   ALKPHOS 111 109   AST 10 10   ALT 8* 6*   ANIONGAP 8 8   EGFRNONAA 30* 35*       Significant Imaging: I have reviewed all pertinent imaging results/findings within the past 24 hours.   Imaging Results          NM Lung Scan Ventilation Perfusion (Final result)  Result time 02/15/22 16:33:46    Final result by Sander Gregory MD (02/15/22 16:33:46)                 Impression:      1.  Indeterminate V/Q scan.  There is a large matched defect within the inferior right lung that does not appear to correspond to an abnormality on the chest x-ray.  No other perfusion defects noted.      Electronically signed by: Sander Gregory MD  Date:    02/15/2022  Time:    16:33             Narrative:    EXAMINATION:  NM LUNG VENTILATION AND PERFUSION IMAGING    CLINICAL HISTORY:  chest pain c hx PE;    TECHNIQUE:  The patient received 1 mCi of technetium 99 DTPA for ventilation images and 6.2 mCi of technetium 99 MAA for perfusion images.    COMPARISON:  Chest x-ray performed earlier the same day    FINDINGS:  The ventilation images demonstrate mainly central  deposition of the radiopharmaceutical.  There is some decrease uptake in the inferior right lung as compared to the rest of the lungs.    Perfusion images demonstrate similar decreased uptake within the inferior right lung, matched with the ventilation defect.  The distribution of radiopharmaceutical is otherwise normal.                               US Lower Extremity Veins Bilateral (Final result)  Result time 02/15/22 13:49:36    Final result by Sander Gregory MD (02/15/22 13:49:36)                 Impression:      1.  No evidence of right or left deep venous thrombosis.      Electronically signed by: Sander Gregory MD  Date:    02/15/2022  Time:    13:49             Narrative:    EXAMINATION:  US LOWER EXTREMITY VEINS BILATERAL    CLINICAL HISTORY:  Other specified soft tissue disorders    TECHNIQUE:  Real-time, color, and duplex evaluation of the deep venous vessels in both lower extremities were performed.    COMPARISON:  No comparison studies are available.    FINDINGS:  No evidence of deep venous thrombosis in either lower extremity. The deep venous vessels demonstrate normal spontaneous and augmented flow with normal respiratory variation.  Deep venous vessels compress in a normal fashion throughout.                               X-Ray Chest AP Portable (Final result)  Result time 02/15/22 12:33:52    Final result by Sander Gregory MD (02/15/22 12:33:52)                 Impression:      1.  Negative for acute process involving the chest, considering there are markedly low lung volumes on today's study.    2.  Stable and incidental findings as noted above.      Electronically signed by: Sander Gregory MD  Date:    02/15/2022  Time:    12:33             Narrative:    EXAMINATION:  XR CHEST AP PORTABLE    CLINICAL HISTORY:  chest pain;    COMPARISON:  [facility], 2019    FINDINGS:  EKG leads overlie the chest.  Moderately low lung volumes on the current study.  The lungs are otherwise clear.  The cardiac  silhouette size is enlarged.  The trachea is midline and the mediastinal width is normal. Negative for focal infiltrate, effusion or pneumothorax. Pulmonary vasculature is normal. Negative for osseous abnormalities. Tortuous aorta.  Degenerative changes of the spine and both shoulder girdles.    Interval clips placed in the right axillary region.  There appears to be dense contrast within the left upper quadrant bowel.                                    Assessment/Plan:      * Acute pulmonary embolism  Unable to obtain CTA chest due to elevated creatinine 1.7.  V/Q scan intermediate with large defect.  Started on heparin drip in the ED.  Transition to oral anticoagulation in 1-2 days.  Monitor on telemetry.  Hemodynamically stable.  Echocardiogram stable w/o RV strain        Acute renal failure superimposed on stage 3 chronic kidney disease  Creatinine currently elevated 1.7 (baseline 1.1-1.4).  Avoid nephrotoxic agents, including contrast for CT.  Normal saline at 100 cc/hour for the next 24 hours.  Repeat labs in a.m..  Monitor urine output.    2/16:  Cr trending down    Chronic diastolic congestive heart failure  Patient is identified as having Diastolic (HFpEF) heart failure that is Chronic. CHF is currently controlled. Latest ECHO performed and demonstrates- No results found for this or any previous visit.  . Continue Furosemide and monitor clinical status closely. Monitor on telemetry. Patient is off CHF pathway.  Monitor strict Is&Os and daily weights.  Place on fluid restriction of 2 L. Continue to stress to patient importance of self efficacy and  on diet for CHF. Last BNP reviewed- and noted below   Recent Labs   Lab 02/15/22  1221   *   .Hold Lasix for now due to worsening renal function.    Morbid obesity with BMI of 45.0-49.9, adult  BMI 43.3    Hypertension  Continue home dose antihypertensives.  Hold diuretics due to worsening renal function.    Malignant neoplasm of upper-outer quadrant  of right breast in female, estrogen receptor positive  Continue home dose Femara  Followed by Dr. Boles in the Oncology Clinic.      VTE Risk Mitigation (From admission, onward)         Ordered     heparin 25,000 units in dextrose 5% (100 units/ml) IV bolus from bag - ADDITIONAL PRN BOLUS - 60 units/kg  As needed (PRN)        Question:  Heparin Infusion Adjustment (DO NOT MODIFY ANSWER)  Answer:  \\[email or web address]\epic\Images\Pharmacy\HeparinInfusions\heparin HIGH INTENSITY nomogram for [facility] [id number].pdf    02/15/22 2034     heparin 25,000 units in dextrose 5% (100 units/ml) IV bolus from bag - ADDITIONAL PRN BOLUS - 30 units/kg  As needed (PRN)        Question:  Heparin Infusion Adjustment (DO NOT MODIFY ANSWER)  Answer:  \\[email or web address]\epic\Images\Pharmacy\HeparinInfusions\heparin HIGH INTENSITY nomogram for [facility] [id number].pdf    02/15/22 2034     heparin 25,000 units in dextrose 5% 250 mL (100 units/mL) infusion HIGH INTENSITY nomogram - [facility]  Continuous        Question Answer Comment   Heparin Infusion Adjustment (DO NOT MODIFY ANSWER) \\[email or web address]\epic\Images\Pharmacy\HeparinInfusions\heparin HIGH INTENSITY nomogram for [facility] [id number].pdf    Begin at (in units/kg/hr) 18        02/15/22 2034                Discharge Planning   MARTA:      Code Status: Prior   Is the patient medically ready for discharge?:     Reason for patient still in hospital (select all that apply): Treatment  Discharge Plan A: Home with family          Anatoliy Jacinto MD  Department of Hospital Medicine   [facility]

## 2022-02-16 NOTE — PLAN OF CARE
[facility]  Initial Discharge Assessment       Primary Care Provider: Peace Arias MD    Admission Diagnosis: Pulmonary embolism [I26.99]  Leg swelling [M79.89]  TANNER (acute kidney injury) [N17.9]  Near syncope [R55]  Acute pulmonary embolism [I26.99]    Admission Date: 2/15/2022  Expected Discharge Date:     Discharge Barriers Identified: None    Payor:  / Plan:  / Product Type: Government /     Extended Emergency Contact Information  Primary Emergency Contact: Campos Luther  Address: [address] [place] [address] [id number] [place] [address]  Home Phone: [phone]  Mobile Phone: [phone]  Relation: Spouse  Secondary Emergency Contact: rnea, april  Mobile Phone: [phone]  Relation: Daughter  Preferred language: English   needed? No    Discharge Plan A: Home with family  Discharge Plan B: Home with family      [facility] [address] [place] [address]  Phone: [phone] Fax: [phone]    [address] [address] [facility]  [address] [facility] [address]  Phone: [phone] Fax: [phone]      Initial Assessment (most recent)     Adult Discharge Assessment - 02/16/22 1054        Discharge Assessment    Assessment Type Discharge Planning Assessment     Confirmed/corrected address, phone number and insurance Yes     Confirmed Demographics Correct on Facesheet     Source of Information patient;family     When was your last doctors appointment? 02/15/22     Communicated MARTA with patient/caregiver Yes     Reason For Admission Blood Pressure check     Lives With spouse     Facility Arrived From: Home     Do you expect to return to your current living situation? Yes     Do you have help at home or someone to help you manage your care at home? Yes     Who are your caregiver(s) and their phone number(s)? Campos Luther [phone]     Prior to hospitilization  cognitive status: Alert/Oriented     Current cognitive status: Alert/Oriented     Walking or Climbing Stairs Difficulty ambulation difficulty, requires equipment     Dressing/Bathing Difficulty none     Equipment Currently Used at Home rollator;cane, straight;CPAP     Readmission within 30 days? No     Patient currently being followed by outpatient case management? No     Do you currently have service(s) that help you manage your care at home? No     Do you take prescription medications? Yes     Do you have prescription coverage? Yes     Do you have any problems affording any of your prescribed medications? No     Is the patient taking medications as prescribed? yes     Who is going to help you get home at discharge? Campos     How do you get to doctors appointments? car, drives self;family or friend will provide     Are you on dialysis? No     Do you take coumadin? No     Discharge Plan A Home with family     Discharge Plan B Home with family     DME Needed Upon Discharge  none     Discharge Plan discussed with: Patient;Spouse/sig other     Name(s) and Number(s) Campos at bedside     Discharge Barriers Identified None        Relationship/Environment    Name(s) of Who Lives With Patient Campos Luther CM met with patient/Campos at the bedside to assess for discharge needs.  Patient is normally independent with needs and does not have any anticipated needs at this time.  CM provided a transitional care folder, information on advanced directives, information on pharmacy bedside delivery, and discharge planning begins on admission with contact information for any needs/questions.

## 2022-02-17 LAB
ALBUMIN SERPL BCP-MCNC: 2.8 G/DL (ref 3.5–5.2)
ALP SERPL-CCNC: 110 U/L (ref 55–135)
ALT SERPL W/O P-5'-P-CCNC: 7 U/L (ref 10–44)
ANION GAP SERPL CALC-SCNC: 9 MMOL/L (ref 8–16)
APTT BLDCRRT: 78.8 SEC (ref 21–32)
AST SERPL-CCNC: 13 U/L (ref 10–40)
BASOPHILS # BLD AUTO: 0.05 K/UL (ref 0–0.2)
BASOPHILS NFR BLD: 0.6 % (ref 0–1.9)
BILIRUB SERPL-MCNC: 0.3 MG/DL (ref 0.1–1)
BUN SERPL-MCNC: 18 MG/DL (ref 8–23)
CALCIUM SERPL-MCNC: 8.6 MG/DL (ref 8.7–10.5)
CHLORIDE SERPL-SCNC: 111 MMOL/L (ref 95–110)
CO2 SERPL-SCNC: 23 MMOL/L (ref 23–29)
CREAT SERPL-MCNC: 1.1 MG/DL (ref 0.5–1.4)
DIFFERENTIAL METHOD: ABNORMAL
EOSINOPHIL # BLD AUTO: 0.1 K/UL (ref 0–0.5)
EOSINOPHIL NFR BLD: 1.4 % (ref 0–8)
ERYTHROCYTE [DISTWIDTH] IN BLOOD BY AUTOMATED COUNT: 15.9 % (ref 11.5–14.5)
EST. GFR  (AFRICAN AMERICAN): 59 ML/MIN/1.73 M^2
EST. GFR  (NON AFRICAN AMERICAN): 51 ML/MIN/1.73 M^2
GLUCOSE SERPL-MCNC: 114 MG/DL (ref 70–110)
HCT VFR BLD AUTO: 33.8 % (ref 37–48.5)
HGB BLD-MCNC: 10.2 G/DL (ref 12–16)
IMM GRANULOCYTES # BLD AUTO: 0.04 K/UL (ref 0–0.04)
IMM GRANULOCYTES NFR BLD AUTO: 0.5 % (ref 0–0.5)
LYMPHOCYTES # BLD AUTO: 2.1 K/UL (ref 1–4.8)
LYMPHOCYTES NFR BLD: 24.7 % (ref 18–48)
MCH RBC QN AUTO: 24.4 PG (ref 27–31)
MCHC RBC AUTO-ENTMCNC: 30.2 G/DL (ref 32–36)
MCV RBC AUTO: 81 FL (ref 82–98)
MONOCYTES # BLD AUTO: 0.6 K/UL (ref 0.3–1)
MONOCYTES NFR BLD: 7 % (ref 4–15)
NEUTROPHILS # BLD AUTO: 5.5 K/UL (ref 1.8–7.7)
NEUTROPHILS NFR BLD: 65.8 % (ref 38–73)
NRBC BLD-RTO: 0 /100 WBC
PLATELET # BLD AUTO: 212 K/UL (ref 150–450)
PMV BLD AUTO: 9.7 FL (ref 9.2–12.9)
POTASSIUM SERPL-SCNC: 4.1 MMOL/L (ref 3.5–5.1)
PROT SERPL-MCNC: 6 G/DL (ref 6–8.4)
RBC # BLD AUTO: 4.18 M/UL (ref 4–5.4)
SODIUM SERPL-SCNC: 143 MMOL/L (ref 136–145)
WBC # BLD AUTO: 8.33 K/UL (ref 3.9–12.7)

## 2022-02-17 PROCEDURE — 85730 THROMBOPLASTIN TIME PARTIAL: CPT | Performed by: STUDENT IN AN ORGANIZED HEALTH CARE EDUCATION/TRAINING PROGRAM

## 2022-02-17 PROCEDURE — 96366 THER/PROPH/DIAG IV INF ADDON: CPT

## 2022-02-17 PROCEDURE — 97166 OT EVAL MOD COMPLEX 45 MIN: CPT | Performed by: PHYSICAL THERAPIST

## 2022-02-17 PROCEDURE — 94761 N-INVAS EAR/PLS OXIMETRY MLT: CPT

## 2022-02-17 PROCEDURE — 97161 PT EVAL LOW COMPLEX 20 MIN: CPT | Performed by: PHYSICAL THERAPIST

## 2022-02-17 PROCEDURE — 80053 COMPREHEN METABOLIC PANEL: CPT | Performed by: STUDENT IN AN ORGANIZED HEALTH CARE EDUCATION/TRAINING PROGRAM

## 2022-02-17 PROCEDURE — 25000003 PHARM REV CODE 250: Performed by: INTERNAL MEDICINE

## 2022-02-17 PROCEDURE — 85025 COMPLETE CBC W/AUTO DIFF WBC: CPT | Performed by: EMERGENCY MEDICINE

## 2022-02-17 PROCEDURE — G0378 HOSPITAL OBSERVATION PER HR: HCPCS

## 2022-02-17 PROCEDURE — 96361 HYDRATE IV INFUSION ADD-ON: CPT

## 2022-02-17 PROCEDURE — 25000003 PHARM REV CODE 250: Performed by: STUDENT IN AN ORGANIZED HEALTH CARE EDUCATION/TRAINING PROGRAM

## 2022-02-17 RX ADMIN — ACETAMINOPHEN 650 MG: 325 TABLET ORAL at 09:02

## 2022-02-17 RX ADMIN — DOXAZOSIN 4 MG: 2 TABLET ORAL at 08:02

## 2022-02-17 RX ADMIN — VENLAFAXINE HYDROCHLORIDE 75 MG: 75 CAPSULE, EXTENDED RELEASE ORAL at 09:02

## 2022-02-17 RX ADMIN — LETROZOLE 2.5 MG: 2.5 TABLET ORAL at 09:02

## 2022-02-17 RX ADMIN — APIXABAN 10 MG: 2.5 TABLET, FILM COATED ORAL at 08:02

## 2022-02-17 RX ADMIN — ASPIRIN 81 MG: 81 TABLET ORAL at 09:02

## 2022-02-17 RX ADMIN — AMLODIPINE BESYLATE 10 MG: 10 TABLET ORAL at 09:02

## 2022-02-17 RX ADMIN — ALPRAZOLAM 0.5 MG: 0.5 TABLET ORAL at 09:02

## 2022-02-17 RX ADMIN — GABAPENTIN 300 MG: 300 CAPSULE ORAL at 09:02

## 2022-02-17 RX ADMIN — HYDRALAZINE HYDROCHLORIDE 100 MG: 25 TABLET, FILM COATED ORAL at 03:02

## 2022-02-17 RX ADMIN — APIXABAN 10 MG: 2.5 TABLET, FILM COATED ORAL at 09:02

## 2022-02-17 RX ADMIN — CLONIDINE HYDROCHLORIDE 0.1 MG: 0.1 TABLET ORAL at 03:02

## 2022-02-17 RX ADMIN — ZOLPIDEM TARTRATE 5 MG: 5 TABLET, COATED ORAL at 11:02

## 2022-02-17 RX ADMIN — GABAPENTIN 300 MG: 300 CAPSULE ORAL at 08:02

## 2022-02-17 RX ADMIN — CLONIDINE HYDROCHLORIDE 0.1 MG: 0.1 TABLET ORAL at 09:02

## 2022-02-17 RX ADMIN — HYDRALAZINE HYDROCHLORIDE 100 MG: 25 TABLET, FILM COATED ORAL at 09:02

## 2022-02-17 RX ADMIN — CLONIDINE HYDROCHLORIDE 0.1 MG: 0.1 TABLET ORAL at 08:02

## 2022-02-17 RX ADMIN — GABAPENTIN 300 MG: 300 CAPSULE ORAL at 03:02

## 2022-02-17 RX ADMIN — MULTIPLE VITAMINS W/ MINERALS TAB 1 TABLET: TAB at 09:02

## 2022-02-17 RX ADMIN — HYDRALAZINE HYDROCHLORIDE 100 MG: 25 TABLET, FILM COATED ORAL at 05:02

## 2022-02-17 RX ADMIN — ATORVASTATIN CALCIUM 40 MG: 40 TABLET, FILM COATED ORAL at 09:02

## 2022-02-17 NOTE — PT/OT/SLP EVAL
Physical Therapy Evaluation    Patient Name:  Susu Luther   MRN:  [id number]    Recommendations:     Discharge Recommendations:  home with home health   Discharge Equipment Recommendations: none   Barriers to discharge: None    Assessment:     Susu Luther is a 72 y.o. female admitted with a medical diagnosis of Acute pulmonary embolism.  She presents with the following impairments/functional limitations:  weakness,impaired self care skills,impaired functional mobilty,gait instability,impaired balance,decreased coordination,decreased safety awareness,pain,impaired coordination,impaired cardiopulmonary response to activity.    Rehab Prognosis: Good; patient would benefit from acute skilled PT services to address these deficits and reach maximum level of function.    Recent Surgery: * No surgery found *      Plan:     During this hospitalization, patient to be seen 3 x/week to address the identified rehab impairments via gait training,therapeutic activities,therapeutic exercises and progress toward the following goals:    · Plan of Care Expires:  03/03/22    Subjective     Chief Complaint: headache/ migraine  Patient/Family Comments/goals: to return home  Pain/Comfort:  · Pain Rating 1: 9/10  · Location 1: head ((R) side breast)  · Pain Addressed 1: Reposition,Distraction,Pre-medicate for activity,Nurse notified  · Pain Rating Post-Intervention 1: 9/10    Patients cultural, spiritual, Yazidi conflicts given the current situation:      Living Environment:  Lives with  in 1 story house with no steps. Pt is retired and still drives.   Prior to admission, patients level of function was Mod I with ADLs and Ambulates with and without Rollator.  Equipment used at home: rollator.  DME owned (not currently used): none.  Upon discharge, patient will have assistance from .    Objective:     Communicated with Nurse Powell and [facility] chart review prior to session.  Patient found supine with peripheral  IV  upon PT entry to room.    General Precautions: Standard, fall   Orthopedic Precautions:N/A   Braces: N/A  Respiratory Status: Room air    Exams:  · Cognitive Exam:  Patient is oriented to Person, Place, Time and Situation  · Gross Motor Coordination:  WFL  · Postural Exam:  Patient presented with the following abnormalities:    · -       No postural abnormalities identified  · Sensation:    · -       Intact  · RLE ROM: WFL  · RLE Strength: WFL  · LLE ROM: WFL  · LLE Strength: WFL    Functional Mobility:  · Bed Mobility:     · Rolling Left:  contact guard assistance  · Rolling Right: contact guard assistance  · Scooting: contact guard assistance  · Supine to Sit: minimum assistance  · Sit to Supine: stand by assistance  · Transfers:     · Sit to Stand:  minimum assistance with rolling walker  · Gait: ~6 steps using RW with CGA to Min A secondary to dizziness  · Balance: Fair standing   · T/f to BSC with Min A using RW    Therapeutic Activities and Exercises:   PT eval completed as listed above.   Pt educated in safety with bed mobility and t/fs, role of PT and POC.    AM-PAC 6 CLICK MOBILITY  Total Score:16     Patient left HOB elevated with all lines intact, call button in reach and Nurse Sherry present.    GOALS:   Multidisciplinary Problems     Physical Therapy Goals        Problem: Physical Therapy Goal    Goal Priority Disciplines Outcome Goal Variances Interventions   Physical Therapy Goal     PT, PT/OT      Description: LTGs to be met by 3/3/3022  1. Pt will perform bed mobility Mod I  2. Pt will perform sit<>stand functional t/fs to chair with Mod I  3. Pt will ambulate ~100ft with Supervision using RW  4. Pt will perform (B) LE therapeutic exercises 2 x 10 reps.                   History:     Past Medical History:   Diagnosis Date    Chronic diastolic congestive heart failure 8/25/2020    Encounter for blood transfusion     History of repair of aneurysm of abdominal aorta using endovascular stent  graft     DR Bonds     of psychiatric care     Hypertension     Major depressive disorder, single episode, moderate with anxious distress 2020    Malignant neoplasm of upper-outer quadrant of right breast in female, estrogen receptor positive 3/14/2019    radiation    Psychiatric problem     Sleep apnea     Sleep difficulties     Stroke 2009    no residual defect    Therapy        Past Surgical History:   Procedure Laterality Date    APPENDECTOMY      AXILLARY NODE DISSECTION Right 2020    Procedure: LYMPHADENECTOMY, AXILLARY;  Surgeon: Artemio Starks MD;  Location: [facility];  Service: General;  Laterality: Right;    BIOPSY OF AXILLARY NODE Right 3/2/2021    Procedure: BIOPSY, LYMPH NODE, AXILLARY;  Surgeon: Artemio Sutton MD;  Location: [address];  Service: General;  Laterality: Right;    BREAST BIOPSY      2019    BREAST LUMPECTOMY      2019     SECTION      x 1    OOPHORECTOMY      right     SENTINEL LYMPH NODE BIOPSY Right 3/27/2019    Procedure: BIOPSY, LYMPH NODE, SENTINEL;  Surgeon: Artemio Starks MD;  Location: [facility];  Service: General;  Laterality: Right;       Time Tracking:     PT Received On: 22  PT Start Time: 0850     PT Stop Time: 0903  PT Total Time (min): 13 min     Billable Minutes: Evaluation 13 min      2022

## 2022-02-17 NOTE — ASSESSMENT & PLAN NOTE
Unable to obtain CTA chest due to elevated creatinine 1.7.  V/Q scan intermediate with large defect.  Started on heparin drip in the ED.  Transition to oral anticoagulation in 1-2 days.  Monitor on telemetry.  Hemodynamically stable.  Echocardiogram stable w/o RV strain    2/17: Started on Eliquis, heparin d/c

## 2022-02-17 NOTE — SUBJECTIVE & OBJECTIVE
Review of Systems   Constitutional: Negative.  Negative for chills, fatigue and fever.   HENT: Negative.  Negative for congestion and nosebleeds.    Eyes: Negative.    Respiratory: Positive for chest tightness and shortness of breath. Negative for cough and wheezing.    Cardiovascular: Positive for chest pain. Negative for palpitations.   Gastrointestinal: Negative.  Negative for abdominal pain, diarrhea, nausea and vomiting.   Endocrine: Negative.    Genitourinary: Negative.  Negative for dysuria.   Musculoskeletal: Negative.  Negative for back pain.   Skin: Negative.  Negative for color change and rash.   Allergic/Immunologic: Positive for immunocompromised state.   Neurological: Positive for dizziness. Negative for syncope, speech difficulty and headaches.   Hematological: Negative.    Psychiatric/Behavioral: Negative.  Negative for confusion, decreased concentration and hallucinations. The patient is not nervous/anxious.    All other systems reviewed and are negative.    Objective:     Vital Signs (Most Recent):  Temp: 97.7 °F (36.5 °C) (02/17/22 1650)  Pulse: 90 (02/17/22 1650)  Resp: 18 (02/17/22 1650)  BP: (!) 121/59 (02/17/22 1650)  SpO2: 98 % (02/17/22 1650) Vital Signs (24h Range):  Temp:  [97.7 °F (36.5 °C)-98.7 °F (37.1 °C)] 97.7 °F (36.5 °C)  Pulse:  [75-95] 90  Resp:  [18-20] 18  SpO2:  [97 %-99 %] 98 %  BP: (112-144)/(59-72) 121/59     Weight: (!) 147.8 kg (325 lb 13.4 oz)  Body mass index is 48.12 kg/m².    Intake/Output Summary (Last 24 hours) at 2/17/2022 1710  Last data filed at 2/17/2022 0636  Gross per 24 hour   Intake 2184.96 ml   Output --   Net 2184.96 ml      Physical Exam  Vitals and nursing note reviewed.   Constitutional:       General: She is awake. She is not in acute distress.     Appearance: Normal appearance. She is morbidly obese.   HENT:      Head: Normocephalic and atraumatic.      Mouth/Throat:      Mouth: Mucous membranes are moist.   Eyes:      General: No scleral icterus.      Conjunctiva/sclera: Conjunctivae normal.   Cardiovascular:      Rate and Rhythm: Normal rate and regular rhythm.      Heart sounds: Normal heart sounds. No murmur heard.      Pulmonary:      Effort: Pulmonary effort is normal. No respiratory distress.      Breath sounds: Normal breath sounds. No rhonchi.      Comments: PRANAV  Abdominal:      Palpations: Abdomen is soft.      Tenderness: There is no abdominal tenderness.   Musculoskeletal:         General: No swelling. Normal range of motion.      Cervical back: Normal range of motion.   Skin:     General: Skin is warm and dry.      Findings: No erythema.      Comments: Right lateral breast knot/pain to touch   Neurological:      General: No focal deficit present.      Mental Status: She is alert and oriented to person, place, and time. Mental status is at baseline.      Motor: No abnormal muscle tone.   Psychiatric:         Mood and Affect: Mood normal.         Behavior: Behavior normal. Behavior is cooperative.         Thought Content: Thought content normal.         Significant Labs:   All pertinent labs within the past 24 hours have been reviewed.  CBC:   Recent Labs   Lab 02/16/22  0612 02/17/22  0406   WBC 7.97 8.33   HGB 10.3* 10.2*   HCT 32.8* 33.8*    212     CMP:   Recent Labs   Lab 02/16/22  0612 02/17/22  0406    143   K 3.9 4.1   * 111*   CO2 22* 23    114*   BUN 24* 18   CREATININE 1.5* 1.1   CALCIUM 8.6* 8.6*   PROT 6.2 6.0   ALBUMIN 2.7* 2.8*   BILITOT 0.3 0.3   ALKPHOS 109 110   AST 10 13   ALT 6* 7*   ANIONGAP 8 9   EGFRNONAA 35* 51*       Significant Imaging: I have reviewed all pertinent imaging results/findings within the past 24 hours.   Imaging Results          NM Lung Scan Ventilation Perfusion (Final result)  Result time 02/15/22 16:33:46    Final result by Sander Gregory MD (02/15/22 16:33:46)                 Impression:      1.  Indeterminate V/Q scan.  There is a large matched defect within the inferior right lung  that does not appear to correspond to an abnormality on the chest x-ray.  No other perfusion defects noted.      Electronically signed by: Sander Gregory MD  Date:    02/15/2022  Time:    16:33             Narrative:    EXAMINATION:  NM LUNG VENTILATION AND PERFUSION IMAGING    CLINICAL HISTORY:  chest pain c hx PE;    TECHNIQUE:  The patient received 1 mCi of technetium 99 DTPA for ventilation images and 6.2 mCi of technetium 99 MAA for perfusion images.    COMPARISON:  Chest x-ray performed earlier the same day    FINDINGS:  The ventilation images demonstrate mainly central deposition of the radiopharmaceutical.  There is some decrease uptake in the inferior right lung as compared to the rest of the lungs.    Perfusion images demonstrate similar decreased uptake within the inferior right lung, matched with the ventilation defect.  The distribution of radiopharmaceutical is otherwise normal.                               US Lower Extremity Veins Bilateral (Final result)  Result time 02/15/22 13:49:36    Final result by Sander Gregory MD (02/15/22 13:49:36)                 Impression:      1.  No evidence of right or left deep venous thrombosis.      Electronically signed by: Sander Gregory MD  Date:    02/15/2022  Time:    13:49             Narrative:    EXAMINATION:  US LOWER EXTREMITY VEINS BILATERAL    CLINICAL HISTORY:  Other specified soft tissue disorders    TECHNIQUE:  Real-time, color, and duplex evaluation of the deep venous vessels in both lower extremities were performed.    COMPARISON:  No comparison studies are available.    FINDINGS:  No evidence of deep venous thrombosis in either lower extremity. The deep venous vessels demonstrate normal spontaneous and augmented flow with normal respiratory variation.  Deep venous vessels compress in a normal fashion throughout.                               X-Ray Chest AP Portable (Final result)  Result time 02/15/22 12:33:52    Final result by Sander Gregory MD  (02/15/22 12:33:52)                 Impression:      1.  Negative for acute process involving the chest, considering there are markedly low lung volumes on today's study.    2.  Stable and incidental findings as noted above.      Electronically signed by: Sander Gregory MD  Date:    02/15/2022  Time:    12:33             Narrative:    EXAMINATION:  XR CHEST AP PORTABLE    CLINICAL HISTORY:  chest pain;    COMPARISON:  [facility], 2019    FINDINGS:  EKG leads overlie the chest.  Moderately low lung volumes on the current study.  The lungs are otherwise clear.  The cardiac silhouette size is enlarged.  The trachea is midline and the mediastinal width is normal. Negative for focal infiltrate, effusion or pneumothorax. Pulmonary vasculature is normal. Negative for osseous abnormalities. Tortuous aorta.  Degenerative changes of the spine and both shoulder girdles.    Interval clips placed in the right axillary region.  There appears to be dense contrast within the left upper quadrant bowel.

## 2022-02-17 NOTE — PT/OT/SLP EVAL
Occupational Therapy   Evaluation    Name: Susu Luther  MRN: [id number]  Admitting Diagnosis:  Acute pulmonary embolism  Recent Surgery: * No surgery found *      Recommendations:     Discharge Recommendations: home with home health  Discharge Equipment Recommendations:  none  Barriers to discharge:  None    Assessment:     Susu Luther is a 72 y.o. female with a medical diagnosis of Acute pulmonary embolism.  She presents with impaired functional mobility and ADLs. Performance deficits affecting function: weakness,impaired self care skills,impaired functional mobilty,gait instability,impaired balance,decreased coordination,decreased safety awareness,pain,impaired coordination,impaired cardiopulmonary response to activity.      Rehab Prognosis: Good; patient would benefit from acute skilled OT services to address these deficits and reach maximum level of function.       Plan:     Patient to be seen 2 x/week to address the above listed problems via self-care/home management,therapeutic activities,therapeutic exercises  · Plan of Care Expires: 03/03/22  · Plan of Care Reviewed with: patient    Subjective     Chief Complaint: Headache/ migraine  Patient/Family Comments/goals: to return home    Occupational Profile:  Living Environment: lives with  in 1 story house with no steps  Previous level of function: Mod I with ADLs; ambulates with Rollator  Roles and Routines: unknown  Equipment Used at Home:  rollator  Assistance upon Discharge:     Pain/Comfort:  · Pain Rating 1: 9/10  · Location 1: head ((R) Side breast)  · Pain Addressed 1: Pre-medicate for activity,Reposition,Distraction,Nurse notified  · Pain Rating Post-Intervention 1: 9/10    Patients cultural, spiritual, Church conflicts given the current situation:      Objective:     Communicated with: Nurse Powell and epic chart review prior to session.  Patient found supine with peripheral IV and oxygen upon OT entry to room.    General  Precautions: Standard, fall   Orthopedic Precautions:N/A   Braces: N/A  Respiratory Status: Nasal cannula, flow 2 L/min    Occupational Performance:    Bed Mobility:    · Patient completed Rolling/Turning to Left with  contact guard assistance  · Patient completed Rolling/Turning to Right with contact guard assistance  · Patient completed Scooting/Bridging with contact guard assistance  · Patient completed Supine to Sit with minimum assistance  · Patient completed Sit to Supine with stand by assistance    Functional Mobility/Transfers:  · Patient completed Sit <> Stand Transfer with minimum assistance  with  rolling walker   · Functional Mobility: ~6 steps using RW with CGA to Min A secondary to dizziness  · T/f to BSC with Min A using RW    Activities of Daily Living:  · Upper Body Dressing: minimum assistance .  · Lower Body Dressing: maximal assistance secodary to dizziness  · Toileting: stand by assistance in standing    Cognitive/Visual Perceptual:  Cognitive/Psychosocial Skills:     -       Oriented to: Person, Place, Time and Situation   -       Follows Commands/attention:Follows two-step commands  -       Communication: clear/fluent  -       Memory: No Deficits noted  -       Safety awareness/insight to disability: impaired   -       Mood/Affect/Coping skills/emotional control: Appropriate to situation  Visual/Perceptual:      -Intact .    Physical Exam:  Balance:    -       Fair standing  Postural examination/scapula alignment:    -       No postural abnormalities identified  Sensation:    -       Intact  Motor Planning:    -       Impaired  Dominant hand:    -       right  Upper Extremity Range of Motion:     -       Right Upper Extremity: WFL  -       Left Upper Extremity: WFL  Upper Extremity Strength:    -       Right Upper Extremity: WFL  -       Left Upper Extremity: WFL   Strength:    -       Right Upper Extremity: WFL  -       Left Upper Extremity: WFL    AMPAC 6 Click ADL:  AMPAC Total Score:  16    Treatment & Education:  OT eval completed as listed above.  Pt educated in safety with bed mobility and t/fs, role of OT and POC.   Education:    Patient left supine with all lines intact, call button in reach and Nurse Sherry present    GOALS:   Multidisciplinary Problems     Occupational Therapy Goals        Problem: Occupational Therapy Goal    Goal Priority Disciplines Outcome Interventions   Occupational Therapy Goal     OT, PT/OT     Description: Goals to be met by: 3/3/2022     Patient will increase functional independence with ADLs by performing:    UE Dressing with Modified Brooks.  LE Dressing with Modified Brooks.  Grooming while standing at sink with Supervision.  Step transfer to toilet with Modified Brooks  Upper extremity exercise program 2x10 reps per handout, with independence.                     History:     Past Medical History:   Diagnosis Date    Chronic diastolic congestive heart failure 8/25/2020    Encounter for blood transfusion     History of repair of aneurysm of abdominal aorta using endovascular stent graft     DR Bonds    Hx of psychiatric care     Hypertension     Major depressive disorder, single episode, moderate with anxious distress 1/14/2020    Malignant neoplasm of upper-outer quadrant of right breast in female, estrogen receptor positive 3/14/2019    radiation    Psychiatric problem     Sleep apnea     Sleep difficulties     Stroke 2009    no residual defect    Therapy        Past Surgical History:   Procedure Laterality Date    APPENDECTOMY      AXILLARY NODE DISSECTION Right 12/2/2020    Procedure: LYMPHADENECTOMY, AXILLARY;  Surgeon: Artemio Starks MD;  [facility];  Service: General;  Laterality: Right;    BIOPSY OF AXILLARY NODE Right 3/2/2021    Procedure: BIOPSY, LYMPH NODE, AXILLARY;  Surgeon: Artemio Sutton MD;  Location: [facility] OR [address] [place] [address];  Service: General;  Laterality: Right;    BREAST BIOPSY      2019    BREAST  LUMPECTOMY      2019     SECTION      x 1    OOPHORECTOMY      right     SENTINEL LYMPH NODE BIOPSY Right 3/27/2019    Procedure: BIOPSY, LYMPH NODE, SENTINEL;  Surgeon: Artemio Starks MD;  Location: [facility];  Service: General;  Laterality: Right;       Time Tracking:     OT Date of Treatment: 22  OT Start Time: 903  OT Stop Time: 915  OT Total Time (min): 12 min    Billable Minutes:Evaluation 12 min    2022

## 2022-02-17 NOTE — PLAN OF CARE
PT & OT Evals completed. Pt with c/o dizziness throughout evals secondary to migraine at this time. Recommend Home with Home Health, depending on progress with POC.

## 2022-02-17 NOTE — PROGRESS NOTES
[facility]  [facility] Medicine  Progress Note    Patient Name: Susu Luther  MRN: [id number]  Patient Class: OP- Observation   Admission Date: 2/15/2022  Length of Stay: 0 days  Attending Physician: Anatoliy Jacinto MD  Primary Care Provider: Peace Arias MD        Subjective:     Principal Problem:Acute pulmonary embolism        HPI:  Ms. Luther is a 72-year-old  female with PMH significant for PE/DVT in the past, stopped anticoagulation at the time of AAA repair last year, diastolic CHF, HTN, breast cancer currently on Femara, presented to the ED complaining of shortness of breath and chest discomfort for the past 1-2 weeks associated with worsened exertional dyspnea.  Non home oxygen dependent.  Also complaining of bilateral lower extremity pain.  D-dimer mildly elevated 0.63.  Ultrasound lower extremities negative for DVT.  Unable to obtain CT PE protocol due to elevated creatinine of 1.7 (baseline 1.1-1.4).  V/Q scan shows intermediate in probability with large matched defect within the inferior right lung.  Started on heparin drip.  HR in the 50s.  /75.    Admitting diagnosis:  Acute PE.      Overview/Hospital Course:  2/16: Admitted for PE, started on heparin drip and will transition to OAC in AM. Pt resting comfortably and stable with c/o right lateral breast pain that is sensitive to touch  2/17: NAEO, headache this AM, not feeling well c/o pins and needles sensation down left side. Started on Eliquis      Review of Systems   Constitutional: Negative.  Negative for chills, fatigue and fever.   HENT: Negative.  Negative for congestion and nosebleeds.    Eyes: Negative.    Respiratory: Positive for chest tightness and shortness of breath. Negative for cough and wheezing.    Cardiovascular: Positive for chest pain. Negative for palpitations.   Gastrointestinal: Negative.  Negative for abdominal pain, diarrhea, nausea and vomiting.   Endocrine: Negative.    Genitourinary:  Negative.  Negative for dysuria.   Musculoskeletal: Negative.  Negative for back pain.   Skin: Negative.  Negative for color change and rash.   Allergic/Immunologic: Positive for immunocompromised state.   Neurological: Positive for dizziness. Negative for syncope, speech difficulty and headaches.   Hematological: Negative.    Psychiatric/Behavioral: Negative.  Negative for confusion, decreased concentration and hallucinations. The patient is not nervous/anxious.    All other systems reviewed and are negative.    Objective:     Vital Signs (Most Recent):  Temp: 97.7 °F (36.5 °C) (02/17/22 1650)  Pulse: 90 (02/17/22 1650)  Resp: 18 (02/17/22 1650)  BP: (!) 121/59 (02/17/22 1650)  SpO2: 98 % (02/17/22 1650) Vital Signs (24h Range):  Temp:  [97.7 °F (36.5 °C)-98.7 °F (37.1 °C)] 97.7 °F (36.5 °C)  Pulse:  [75-95] 90  Resp:  [18-20] 18  SpO2:  [97 %-99 %] 98 %  BP: (112-144)/(59-72) 121/59     Weight: (!) 147.8 kg (325 lb 13.4 oz)  Body mass index is 48.12 kg/m².    Intake/Output Summary (Last 24 hours) at 2/17/2022 1710  Last data filed at 2/17/2022 0636  Gross per 24 hour   Intake 2184.96 ml   Output --   Net 2184.96 ml      Physical Exam  Vitals and nursing note reviewed.   Constitutional:       General: She is awake. She is not in acute distress.     Appearance: Normal appearance. She is morbidly obese.   HENT:      Head: Normocephalic and atraumatic.      Mouth/Throat:      Mouth: Mucous membranes are moist.   Eyes:      General: No scleral icterus.     Conjunctiva/sclera: Conjunctivae normal.   Cardiovascular:      Rate and Rhythm: Normal rate and regular rhythm.      Heart sounds: Normal heart sounds. No murmur heard.      Pulmonary:      Effort: Pulmonary effort is normal. No respiratory distress.      Breath sounds: Normal breath sounds. No rhonchi.      Comments: PRANAV  Abdominal:      Palpations: Abdomen is soft.      Tenderness: There is no abdominal tenderness.   Musculoskeletal:         General: No swelling.  Normal range of motion.      Cervical back: Normal range of motion.   Skin:     General: Skin is warm and dry.      Findings: No erythema.      Comments: Right lateral breast knot/pain to touch   Neurological:      General: No focal deficit present.      Mental Status: She is alert and oriented to person, place, and time. Mental status is at baseline.      Motor: No abnormal muscle tone.   Psychiatric:         Mood and Affect: Mood normal.         Behavior: Behavior normal. Behavior is cooperative.         Thought Content: Thought content normal.         Significant Labs:   All pertinent labs within the past 24 hours have been reviewed.  CBC:   Recent Labs   Lab 02/16/22  0612 02/17/22  0406   WBC 7.97 8.33   HGB 10.3* 10.2*   HCT 32.8* 33.8*    212     CMP:   Recent Labs   Lab 02/16/22 0612 02/17/22  0406    143   K 3.9 4.1   * 111*   CO2 22* 23    114*   BUN 24* 18   CREATININE 1.5* 1.1   CALCIUM 8.6* 8.6*   PROT 6.2 6.0   ALBUMIN 2.7* 2.8*   BILITOT 0.3 0.3   ALKPHOS 109 110   AST 10 13   ALT 6* 7*   ANIONGAP 8 9   EGFRNONAA 35* 51*       Significant Imaging: I have reviewed all pertinent imaging results/findings within the past 24 hours.   Imaging Results          NM Lung Scan Ventilation Perfusion (Final result)  Result time 02/15/22 16:33:46    Final result by Sander Gregory MD (02/15/22 16:33:46)                 Impression:      1.  Indeterminate V/Q scan.  There is a large matched defect within the inferior right lung that does not appear to correspond to an abnormality on the chest x-ray.  No other perfusion defects noted.      Electronically signed by: Sander Gregory MD  Date:    02/15/2022  Time:    16:33             Narrative:    EXAMINATION:  NM LUNG VENTILATION AND PERFUSION IMAGING    CLINICAL HISTORY:  chest pain c hx PE;    TECHNIQUE:  The patient received 1 mCi of technetium 99 DTPA for ventilation images and 6.2 mCi of technetium 99 MAA for perfusion  images.    COMPARISON:  Chest x-ray performed earlier the same day    FINDINGS:  The ventilation images demonstrate mainly central deposition of the radiopharmaceutical.  There is some decrease uptake in the inferior right lung as compared to the rest of the lungs.    Perfusion images demonstrate similar decreased uptake within the inferior right lung, matched with the ventilation defect.  The distribution of radiopharmaceutical is otherwise normal.                               US Lower Extremity Veins Bilateral (Final result)  Result time 02/15/22 13:49:36    Final result by Sander Gregory MD (02/15/22 13:49:36)                 Impression:      1.  No evidence of right or left deep venous thrombosis.      Electronically signed by: Sander Gregory MD  Date:    02/15/2022  Time:    13:49             Narrative:    EXAMINATION:  US LOWER EXTREMITY VEINS BILATERAL    CLINICAL HISTORY:  Other specified soft tissue disorders    TECHNIQUE:  Real-time, color, and duplex evaluation of the deep venous vessels in both lower extremities were performed.    COMPARISON:  No comparison studies are available.    FINDINGS:  No evidence of deep venous thrombosis in either lower extremity. The deep venous vessels demonstrate normal spontaneous and augmented flow with normal respiratory variation.  Deep venous vessels compress in a normal fashion throughout.                               X-Ray Chest AP Portable (Final result)  Result time 02/15/22 12:33:52    Final result by Sander Gregory MD (02/15/22 12:33:52)                 Impression:      1.  Negative for acute process involving the chest, considering there are markedly low lung volumes on today's study.    2.  Stable and incidental findings as noted above.      Electronically signed by: Sander Gregory MD  Date:    02/15/2022  Time:    12:33             Narrative:    EXAMINATION:  XR CHEST AP PORTABLE    CLINICAL HISTORY:  chest pain;    COMPARISON:  [facility], 2019    FINDINGS:  EKG  leads overlie the chest.  Moderately low lung volumes on the current study.  The lungs are otherwise clear.  The cardiac silhouette size is enlarged.  The trachea is midline and the mediastinal width is normal. Negative for focal infiltrate, effusion or pneumothorax. Pulmonary vasculature is normal. Negative for osseous abnormalities. Tortuous aorta.  Degenerative changes of the spine and both shoulder girdles.    Interval clips placed in the right axillary region.  There appears to be dense contrast within the left upper quadrant bowel.                                    Assessment/Plan:      * Acute pulmonary embolism  Unable to obtain CTA chest due to elevated creatinine 1.7.  V/Q scan intermediate with large defect.  Started on heparin drip in the ED.  Transition to oral anticoagulation in 1-2 days.  Monitor on telemetry.  Hemodynamically stable.  Echocardiogram stable w/o RV strain    2/17: Started on Eliquis, heparin d/c      Acute renal failure superimposed on stage 3 chronic kidney disease  Creatinine currently elevated 1.7 (baseline 1.1-1.4).  Avoid nephrotoxic agents, including contrast for CT.  Normal saline at 100 cc/hour for the next 24 hours.  Repeat labs in a.m..  Monitor urine output.    2/16:  Cr trending down    Chronic diastolic congestive heart failure  Patient is identified as having Diastolic (HFpEF) heart failure that is Chronic. CHF is currently controlled. Latest ECHO performed and demonstrates- No results found for this or any previous visit.  . Continue Furosemide and monitor clinical status closely. Monitor on telemetry. Patient is off CHF pathway.  Monitor strict Is&Os and daily weights.  Place on fluid restriction of 2 L. Continue to stress to patient importance of self efficacy and  on diet for CHF. Last BNP reviewed- and noted below   Recent Labs   Lab 02/15/22  1221   *   .Hold Lasix for now due to worsening renal function.    Morbid obesity with BMI of 45.0-49.9,  adult  BMI 43.3    Hypertension  Continue home dose antihypertensives.  Hold diuretics due to worsening renal function.    Malignant neoplasm of upper-outer quadrant of right breast in female, estrogen receptor positive  Continue home dose Femara  Followed by Dr. Boles in the Oncology Clinic.      VTE Risk Mitigation (From admission, onward)         Ordered     apixaban tablet 5 mg  2 times daily         02/17/22 0833     apixaban tablet 10 mg  2 times daily         02/17/22 0814                Discharge Planning   MARTA: 2/17/2022     Code Status: Prior   Is the patient medically ready for discharge?:     Reason for patient still in hospital (select all that apply): Treatment  Discharge Plan A: Home with family              Anatoliy Jacinto MD  Department of Hospital Medicine   [facility]

## 2022-02-18 ENCOUNTER — PATIENT MESSAGE (OUTPATIENT)
Dept: INTERNAL MEDICINE | Facility: CLINIC | Age: 72
End: 2022-02-18
Payer: MEDICARE

## 2022-02-18 ENCOUNTER — PATIENT OUTREACH (OUTPATIENT)
Dept: ADMINISTRATIVE | Facility: HOSPITAL | Age: 72
End: 2022-02-18
Payer: MEDICARE

## 2022-02-18 VITALS
DIASTOLIC BLOOD PRESSURE: 83 MMHG | SYSTOLIC BLOOD PRESSURE: 139 MMHG | HEART RATE: 82 BPM | HEIGHT: 69 IN | BODY MASS INDEX: 43.4 KG/M2 | RESPIRATION RATE: 18 BRPM | TEMPERATURE: 98 F | OXYGEN SATURATION: 99 % | WEIGHT: 293 LBS

## 2022-02-18 DIAGNOSIS — C50.411 MALIGNANT NEOPLASM OF UPPER-OUTER QUADRANT OF RIGHT BREAST IN FEMALE, ESTROGEN RECEPTOR POSITIVE: Chronic | ICD-10-CM

## 2022-02-18 DIAGNOSIS — Z17.0 MALIGNANT NEOPLASM OF UPPER-OUTER QUADRANT OF RIGHT BREAST IN FEMALE, ESTROGEN RECEPTOR POSITIVE: Chronic | ICD-10-CM

## 2022-02-18 DIAGNOSIS — F41.9 ANXIETY: ICD-10-CM

## 2022-02-18 PROBLEM — M16.11 PRIMARY OSTEOARTHRITIS OF RIGHT HIP: Status: ACTIVE | Noted: 2022-02-18

## 2022-02-18 PROCEDURE — 97116 GAIT TRAINING THERAPY: CPT | Performed by: PHYSICAL THERAPIST

## 2022-02-18 PROCEDURE — 97530 THERAPEUTIC ACTIVITIES: CPT | Performed by: PHYSICAL THERAPIST

## 2022-02-18 PROCEDURE — 94761 N-INVAS EAR/PLS OXIMETRY MLT: CPT

## 2022-02-18 PROCEDURE — 25000003 PHARM REV CODE 250: Performed by: STUDENT IN AN ORGANIZED HEALTH CARE EDUCATION/TRAINING PROGRAM

## 2022-02-18 PROCEDURE — 25000003 PHARM REV CODE 250: Performed by: INTERNAL MEDICINE

## 2022-02-18 PROCEDURE — G0378 HOSPITAL OBSERVATION PER HR: HCPCS

## 2022-02-18 PROCEDURE — 97110 THERAPEUTIC EXERCISES: CPT | Performed by: PHYSICAL THERAPIST

## 2022-02-18 PROCEDURE — 96361 HYDRATE IV INFUSION ADD-ON: CPT

## 2022-02-18 RX ADMIN — LETROZOLE 2.5 MG: 2.5 TABLET ORAL at 08:02

## 2022-02-18 RX ADMIN — GABAPENTIN 300 MG: 300 CAPSULE ORAL at 08:02

## 2022-02-18 RX ADMIN — HYDRALAZINE HYDROCHLORIDE 100 MG: 25 TABLET, FILM COATED ORAL at 05:02

## 2022-02-18 RX ADMIN — CLONIDINE HYDROCHLORIDE 0.1 MG: 0.1 TABLET ORAL at 08:02

## 2022-02-18 RX ADMIN — ASPIRIN 81 MG: 81 TABLET ORAL at 08:02

## 2022-02-18 RX ADMIN — VENLAFAXINE HYDROCHLORIDE 75 MG: 75 CAPSULE, EXTENDED RELEASE ORAL at 08:02

## 2022-02-18 RX ADMIN — AMLODIPINE BESYLATE 10 MG: 10 TABLET ORAL at 08:02

## 2022-02-18 RX ADMIN — MULTIPLE VITAMINS W/ MINERALS TAB 1 TABLET: TAB at 08:02

## 2022-02-18 RX ADMIN — ALPRAZOLAM 0.5 MG: 0.5 TABLET ORAL at 08:02

## 2022-02-18 RX ADMIN — APIXABAN 10 MG: 2.5 TABLET, FILM COATED ORAL at 08:02

## 2022-02-18 RX ADMIN — ATORVASTATIN CALCIUM 40 MG: 40 TABLET, FILM COATED ORAL at 08:02

## 2022-02-18 NOTE — PT/OT/SLP PROGRESS
Physical Therapy  Treatment    Susu Luther   MRN: [id number]   Admitting Diagnosis: Acute pulmonary embolism    PT Received On: 02/18/22  PT Start Time: 1106     PT Stop Time: 1129    PT Total Time (min): 23 min       Billable Minutes:  Gait Training 13 min and Therapeutic Activity 10 min    Treatment Type: Evaluation  PT/PTA: PT     PTA Visit Number: 0       General Precautions: Standard, fall  Orthopedic Precautions: N/A   Braces: N/A  Respiratory Status: Room air         Subjective:  Communicated with Nurse Powell and [facility] chart review prior to session.  Pt found supine in bed with HOB elevated and pt agreeable to tx this time.     Pain/Comfort  Pain Rating 1: 6/10  Location 1: head  Pain Addressed 1: Reposition,Distraction    Objective:   Patient found with: peripheral IV    Functional Mobility:  Therapeutic Activities and Exercises:  Pt performed supine>sit with Mod I, scooted to EOB with Mod I. Pt donned gown in sitting with Mod I. Pt performed sit>stand with Supervision using RW, gait trained ~75ft x 2 using RW with Supervision and required 1 standing rest break. Pt returned to room and t/f to sitting EOB. Pt educated in proper breathing technique in order to decrease SOB. Pt returned to supine in bed with Mod I.      AM-PAC 6 CLICK MOBILITY  How much help from another person does this patient currently need?   1 = Unable, Total/Dependent Assistance  2 = A lot, Maximum/Moderate Assistance  3 = A little, Minimum/Contact Guard/Supervision  4 = None, Modified [place]/Independent    Turning over in bed (including adjusting bedclothes, sheets and blankets)?: 4  Sitting down on and standing up from a chair with arms (e.g., wheelchair, bedside commode, etc.): 4  Moving from lying on back to sitting on the side of the bed?: 4  Moving to and from a bed to a chair (including a wheelchair)?: 4  Need to walk in hospital room?: 4  Climbing 3-5 steps with a railing?: 1  Basic Mobility Total Score: 21    AM-PAC  Raw Score CMS G-Code Modifier Level of Impairment Assistance   6 % Total / Unable   7 - 9 CM 80 - 100% Maximal Assist   10 - 14 CL 60 - 80% Moderate Assist   15 - 19 CK 40 - 60% Moderate Assist   20 - 22 CJ 20 - 40% Minimal Assist   23 CI 1-20% SBA / CGA   24 CH 0% Independent/ Mod I     Patient left HOB elevated with all lines intact, call button in reach, Nurse Sherry  notified and  present.    Assessment:  Susu Luther is a 72 y.o. female with a medical diagnosis of Acute pulmonary embolism and presents with impaired functional mobility. Pt will benefit from continued skilled PT in order to address the listed impairments.     Rehab identified problem list/impairments: Rehab identified problem list/impairments: weakness,impaired self care skills,impaired functional mobilty,gait instability,impaired balance,decreased coordination,decreased safety awareness,pain,impaired coordination,impaired cardiopulmonary response to activity    Rehab potential is good.    Activity tolerance: Good    Discharge recommendations: Discharge Facility/Level of Care Needs: home with home health     Barriers to discharge: Barriers to Discharge: None    Equipment recommendations: Equipment Needed After Discharge: none     GOALS:   Multidisciplinary Problems     Physical Therapy Goals        Problem: Physical Therapy Goal    Goal Priority Disciplines Outcome Goal Variances Interventions   Physical Therapy Goal     PT, PT/OT Ongoing, Progressing     Description: LTGs to be met by 3/3/3022  1. Pt will perform bed mobility Mod I  2. Pt will perform sit<>stand functional t/fs to chair with Mod I  3. Pt will ambulate ~100ft with Supervision using RW  4. Pt will perform (B) LE therapeutic exercises 2 x 10 reps.                   PLAN:    Patient to be seen 3 x/week  to address the above listed problems via gait training,therapeutic activities,therapeutic exercises  Plan of Care expires: 03/03/22  Plan of Care reviewed with:  patient,spouse         02/18/2022

## 2022-02-18 NOTE — PT/OT/SLP PROGRESS
"Physical Therapy  Treatment    Susu Luther   MRN: [id number]   Admitting Diagnosis: Acute pulmonary embolism    PT Received On: 02/18/22  PT Start Time: 0830     PT Stop Time: 0842    PT Total Time (min): 12 min       Billable Minutes:  Therapeutic Exercise 12 min    Treatment Type: Evaluation  PT/PTA: PT     PTA Visit Number: 0       General Precautions: Standard, fall  Orthopedic Precautions: N/A   Braces: N/A  Respiratory Status: Room air         Subjective:  Communicated with Nurse Sherry and epic chart criss prior to session.  Pt found supine in bed and agreeable to tx at this time.     Pain/Comfort  Pain Rating 1: 8/10  Location 1: head  Pain Addressed 1: Reposition,Distraction    Objective:   Patient found with: peripheral IV    Functional Mobility:  Therapeutic Activities and Exercises:  Pt performed supine>sit with Mod I, scooted to EOB with Mod I. Pt sat EOB with Fair+ sitting balance. Pt educated in and performed (B) LE therapeutic exercises 1 x 10 reps: MIP. TKE, AP. Pt reported getting extremely dizzy and having "the spins," therefore returned to supine in bed with Mod I. Pt unable ot ambulate at this time.      AM-PAC 6 CLICK MOBILITY  How much help from another person does this patient currently need?   1 = Unable, Total/Dependent Assistance  2 = A lot, Maximum/Moderate Assistance  3 = A little, Minimum/Contact Guard/Supervision  4 = None, Modified [place]/Independent    Turning over in bed (including adjusting bedclothes, sheets and blankets)?: 3  Sitting down on and standing up from a chair with arms (e.g., wheelchair, bedside commode, etc.): 3  Moving from lying on back to sitting on the side of the bed?: 3  Moving to and from a bed to a chair (including a wheelchair)?: 1  Need to walk in hospital room?: 1  Climbing 3-5 steps with a railing?: 1  Basic Mobility Total Score: 12    AM-PAC Raw Score CMS G-Code Modifier Level of Impairment Assistance   6 % Total / Unable   7 - 9 CM 80 " - 100% Maximal Assist   10 - 14 CL 60 - 80% Moderate Assist   15 - 19 CK 40 - 60% Moderate Assist   20 - 22 CJ 20 - 40% Minimal Assist   23 CI 1-20% SBA / CGA   24 CH 0% Independent/ Mod I     Patient left HOB elevated with all lines intact, call button in reach, bed alarm on and Nurse Sherry and Dr. Jacinto notified.    Assessment:  Susu Luther is a 72 y.o. female with a medical diagnosis of Acute pulmonary embolism and presents with impaired functional mobility. Pt will benefit from continued skilled PT in order to address the listed impairments.     Rehab identified problem list/impairments: Rehab identified problem list/impairments: weakness,impaired self care skills,impaired functional mobilty,gait instability,impaired balance,decreased coordination,decreased safety awareness,pain,impaired coordination,impaired cardiopulmonary response to activity    Rehab potential is fair.    Activity tolerance: Fair    Discharge recommendations: Discharge Facility/Level of Care Needs: home with home health     Barriers to discharge: Barriers to Discharge: None    Equipment recommendations: Equipment Needed After Discharge: none     GOALS:   Multidisciplinary Problems     Physical Therapy Goals        Problem: Physical Therapy Goal    Goal Priority Disciplines Outcome Goal Variances Interventions   Physical Therapy Goal     PT, PT/OT Ongoing, Progressing     Description: LTGs to be met by 3/3/3022  1. Pt will perform bed mobility Mod I  2. Pt will perform sit<>stand functional t/fs to chair with Mod I  3. Pt will ambulate ~100ft with Supervision using RW  4. Pt will perform (B) LE therapeutic exercises 2 x 10 reps.                   PLAN:    Patient to be seen 3 x/week  to address the above listed problems via gait training,therapeutic activities,therapeutic exercises  Plan of Care expires: 03/03/22  Plan of Care reviewed with: patient         02/18/2022

## 2022-02-18 NOTE — TELEPHONE ENCOUNTER
lov 2/1/2022 with BRANDIN Mcintosh    Has hospital f/u with BRANDIN Shaver Monday 2/21/22    Pt requesting refills.

## 2022-02-18 NOTE — PLAN OF CARE
Pt very dizzy during 1st tx session and was not able to ambulate. Returned for 2nd session and pt amulated ~75ft x 2 using RW with Supervision.

## 2022-02-18 NOTE — PLAN OF CARE
[facility]  Discharge Final Note    Primary Care Provider: Peace Arias MD    Expected Discharge Date: 2/18/2022    Final Discharge Note (most recent)     Final Note - 02/18/22 1321        Final Note    Assessment Type Final Discharge Note     Anticipated Discharge Disposition Home or Self Care     Hospital Resources/Appts/Education Provided Appointments scheduled and added to AVS                 Important Message from Medicare            Feb21 Hospital Follow Up with Tricia Denis PA-C  Monday Feb 21, 2022 3:00 PM  Arrive at check-in approximately 15 minutes before your scheduled appointment time. Bring all outside medical records and imaging, along with a list of your current medications and insurance card.  Please take [address] for the Medical Office Building. Check in on the 1st floor. [facility]  [address] [facility] [id number] [phone]

## 2022-02-18 NOTE — DISCHARGE SUMMARY
[facility]  Hospital Medicine  Discharge Summary      Patient Name: Susu Luther  MRN: [id number]  Patient Class: OP- Observation  Admission Date: 2/15/2022  Hospital Length of Stay: 0 days  Discharge Date and Time:  02/18/2022 3:02 PM  Attending Physician: No att. providers found   Discharging Provider: Anatoliy Jacinto MD  Primary Care Provider: Peace Arias MD      HPI:   Ms. Luther is a 72-year-old  female with PMH significant for PE/DVT in the past, stopped anticoagulation at the time of AAA repair last year, diastolic CHF, HTN, breast cancer currently on Femara, presented to the ED complaining of shortness of breath and chest discomfort for the past 1-2 weeks associated with worsened exertional dyspnea.  Non home oxygen dependent.  Also complaining of bilateral lower extremity pain.  D-dimer mildly elevated 0.63.  Ultrasound lower extremities negative for DVT.  Unable to obtain CT PE protocol due to elevated creatinine of 1.7 (baseline 1.1-1.4).  V/Q scan shows intermediate in probability with large matched defect within the inferior right lung.  Started on heparin drip.  HR in the 50s.  /75.    Admitting diagnosis:  Acute PE.      Hospital Course:   2/16: Admitted for PE, started on heparin drip and will transition to OAC in AM. Pt resting comfortably and stable with c/o right lateral breast pain that is sensitive to touch  2/17: NAEO, headache this AM, not feeling well c/o pins and needles sensation down left side. Started on Eliquis       Goals of Care Treatment Preferences:  Code Status: Full Code      Consults:   Consults (From admission, onward)        Status Ordering Provider     [facility]  Once        Provider:  (Not yet assigned)    Acknowledged CRISTELA ZHONG          * Acute pulmonary embolism  Unable to obtain CTA chest due to elevated creatinine 1.7.  V/Q scan intermediate with large defect.  Started on heparin drip in the  ED.  Transition to oral anticoagulation in 1-2 days.  Monitor on telemetry.  Hemodynamically stable.  Echocardiogram stable w/o RV strain    2/17: Started on Eliquis, heparin d/c      Primary osteoarthritis of right hip  Outpatient referral      Acute renal failure superimposed on stage 3 chronic kidney disease  Creatinine currently elevated 1.7 (baseline 1.1-1.4).  Avoid nephrotoxic agents, including contrast for CT.  Normal saline at 100 cc/hour for the next 24 hours.  Repeat labs in a.m..  Monitor urine output.    2/16:  Cr trending down    Chronic diastolic congestive heart failure  Patient is identified as having Diastolic (HFpEF) heart failure that is Chronic. CHF is currently controlled. Latest ECHO performed and demonstrates- No results found for this or any previous visit.  . Continue Furosemide and monitor clinical status closely. Monitor on telemetry. Patient is off CHF pathway.  Monitor strict Is&Os and daily weights.  Place on fluid restriction of 2 L. Continue to stress to patient importance of self efficacy and  on diet for CHF. Last BNP reviewed- and noted below   Recent Labs   Lab 02/15/22  1221   *   .Hold Lasix for now due to worsening renal function.    Hypertension  Continue home dose antihypertensives.  Hold diuretics due to worsening renal function.    Malignant neoplasm of upper-outer quadrant of right breast in female, estrogen receptor positive  Continue home dose Femara  Followed by Dr. Boles in the Oncology Clinic.      Final Active Diagnoses:    Diagnosis Date Noted POA    PRINCIPAL PROBLEM:  Acute pulmonary embolism [I26.99] 02/15/2022 Yes    Primary osteoarthritis of right hip [M16.11] 02/18/2022 Yes    Acute renal failure superimposed on stage 3 chronic kidney disease [N17.9, N18.30] 02/15/2022 Yes    Chronic diastolic congestive heart failure [I50.32] 08/25/2020 Yes    Morbid obesity with BMI of 45.0-49.9, adult [E66.01, Z68.42] 06/28/2019 Not Applicable     Hypertension [I10]  Yes    Malignant neoplasm of upper-outer quadrant of right breast in female, estrogen receptor positive [C50.411, Z17.0] 03/14/2019 Not Applicable     Chronic      Problems Resolved During this Admission:       Discharged Condition: stable    Disposition: Home or Self Care    Follow Up:   Follow-up Information     Peace Arias MD In 1 week.    Specialty: Internal Medicine  Contact information:  [address] [place] [address] [phone]             Sheryl Boles MD In 1 week.    Specialty: Hematology and Oncology  Contact information:  [address]  [place] [address] [phone]                       Patient Instructions:      Ambulatory referral/consult to [facility]   Standing Status: Future   Referral Priority: Routine Referral Type: Consultation   Referral Reason: Specialty Services Required   Number of Visits Requested: 1     Ambulatory referral/consult to [facility]   Standing Status: Future   Referral Priority: Routine Referral Type: Consultation   Referral Reason: Specialty Services Required   Number of Visits Requested: 1     Ambulatory referral/consult to Physical/Occupational Therapy   Standing Status: Future   Referral Priority: Routine Referral Type: Physical Medicine   Referral Reason: Specialty Services Required   Number of Visits Requested: 1     Diet Cardiac     Diet Cardiac     Activity as tolerated     Activity as tolerated       Significant Diagnostic Studies: Labs: All labs within the past 24 hours have been reviewed    Pending Diagnostic Studies:     None         Medications:  Reconciled Home Medications:      Medication List      START taking these medications    ELIQUIS 5 mg Tab  Generic drug: apixaban  take 2 tabs twice a day for 4 days then Take 1 tablet (5 mg total) by mouth 2 (two) times daily.  Start taking on: February 23, 2022        CHANGE how you take these medications    cholecalciferol  (vitamin D3) 1,250 mcg (50,000 unit) capsule  Take 1 capsule (50,000 Units total) by mouth once a week.  What changed: additional instructions     gabapentin 600 MG tablet  Commonly known as: NEURONTIN  Take 1 tablet (600 mg total) by mouth 3 (three) times daily.  What changed: when to take this        CONTINUE taking these medications    albuterol 90 mcg/actuation inhaler  Commonly known as: VENTOLIN HFA  Inhale 2 puffs into the lungs every 4 (four) hours as needed for Shortness of Breath.     ALPRAZolam 0.5 MG tablet  Commonly known as: XANAX  Take 1 tablet (0.5 mg total) by mouth 2 (two) times daily as needed for Insomnia or Anxiety.     amLODIPine 10 MG tablet  Commonly known as: NORVASC  Take 1 tablet (10 mg total) by mouth once daily.     aspirin 81 MG EC tablet  Commonly known as: ECOTRIN  Take 81 mg by mouth once daily.     atorvastatin 40 MG tablet  Commonly known as: LIPITOR  Take 1 tablet (40 mg total) by mouth once daily.     cloNIDine 0.1 MG tablet  Commonly known as: CATAPRES  Take 1 tablet (0.1 mg total) by mouth 3 (three) times daily.     doxazosin 4 MG tablet  Commonly known as: CARDURA  Take 1 tablet (4 mg total) by mouth every evening.     furosemide 20 MG tablet  Commonly known as: LASIX  take 1/2 tablet by mouth once a day.     hydrALAZINE 100 MG tablet  Commonly known as: APRESOLINE  TAKE 1 TABLET BY MOUTH EVERY 8 HOURS     hydroCHLOROthiazide 50 MG tablet  Commonly known as: HYDRODIURIL  Take 1 tablet (50 mg total) by mouth once daily.     hydrOXYzine HCL 25 MG tablet  Commonly known as: ATARAX  Take 1 tablet (25 mg total) by mouth every evening.     letrozole 2.5 mg Tab  Commonly known as: FEMARA  Take 1 tablet (2.5 mg total) by mouth once daily.     LIDOcaine 5 %  Commonly known as: LIDODERM  Place 2 patches onto the skin once daily. Remove & Discard patch within 12 hours or as directed by MD     multivitamin per tablet  Commonly known as: THERAGRAN  Take 1 tablet by mouth once daily.      propranoloL 40 MG tablet  Commonly known as: INDERAL  Take 1 tablet by mouth twice daily     telmisartan 80 MG Tab  Commonly known as: MICARDIS  Take 1 tablet (80 mg total) by mouth once daily.     venlafaxine 75 MG 24 hr capsule  Commonly known as: EFFEXOR-XR  Take 1 capsule (75 mg total) by mouth once daily.     zolpidem 5 MG Tab  Commonly known as: AMBIEN  Take 1 tablet (5 mg total) by mouth nightly as needed (sleep).            Indwelling Lines/Drains at time of discharge:   Lines/Drains/Airways     Drain                 Closed/Suction Drain 03/02/21 1625 Right Breast Bulb 19 Fr. 352 days                Time spent on the discharge of patient: 40 minutes         Anatoliy Jacinto MD  Department of Hospital Medicine  [facility]

## 2022-02-18 NOTE — TELEPHONE ENCOUNTER
----- Message from Safia BROWNING Clemente sent at 2/18/2022  3:17 PM CST -----  Contact: ERLINDA ARREAGA [[id number]]  .Type:  Patient Returning Call    Who Called: ERLINDA ARREAGA [[id number]]  Who Left Message for Patient: nurse   Does the patient know what this is regarding?:   Would the patient rather a call back or a response via [facility]?  Call   Best Call Back Number: [phone]  Additional Information: pt states all of the nurse calls are coming through as a spam call

## 2022-02-18 NOTE — PROGRESS NOTES
Called patient to confirm hospital follow up scheduled on 2/21/2022.   Patient is not confirmed. LM to confirm appt.

## 2022-02-18 NOTE — TELEPHONE ENCOUNTER
No new care gaps identified.  Powered by [employer]. Reference number: [id number].   2/18/2022 4:20:06 PM CST

## 2022-02-18 NOTE — PLAN OF CARE
Pt oriented x4 VSS  Pt remained afebile throughout this shift  All meds administered per order.  Pt remianed free of falls  Plan of care reviewed. pt verbalizes understanding.  Patient moving/ turning with assist.  Patient normal sinus on monitor; FRANCISCO PICC intact.  Bed low, side rails up x2, wheels locked, call light in reach, telesitter at bedside.  Pt instructed to call for assistance  Hourly rounding completed.

## 2022-02-21 ENCOUNTER — PATIENT MESSAGE (OUTPATIENT)
Dept: HEMATOLOGY/ONCOLOGY | Facility: CLINIC | Age: 72
End: 2022-02-21
Payer: MEDICARE

## 2022-02-21 ENCOUNTER — OFFICE VISIT (OUTPATIENT)
Dept: INTERNAL MEDICINE | Facility: CLINIC | Age: 72
End: 2022-02-21
Payer: OTHER GOVERNMENT

## 2022-02-21 ENCOUNTER — LAB VISIT (OUTPATIENT)
Dept: LAB | Facility: HOSPITAL | Age: 72
End: 2022-02-21
Attending: PHYSICIAN ASSISTANT
Payer: MEDICARE

## 2022-02-21 VITALS
TEMPERATURE: 98 F | HEIGHT: 69 IN | RESPIRATION RATE: 18 BRPM | HEART RATE: 63 BPM | OXYGEN SATURATION: 95 % | DIASTOLIC BLOOD PRESSURE: 86 MMHG | SYSTOLIC BLOOD PRESSURE: 138 MMHG | BODY MASS INDEX: 43.4 KG/M2 | WEIGHT: 293 LBS

## 2022-02-21 DIAGNOSIS — M79.89 LEG SWELLING: ICD-10-CM

## 2022-02-21 DIAGNOSIS — R51.9 NONINTRACTABLE HEADACHE, UNSPECIFIED CHRONICITY PATTERN, UNSPECIFIED HEADACHE TYPE: ICD-10-CM

## 2022-02-21 DIAGNOSIS — N18.31 CHRONIC KIDNEY DISEASE, STAGE 3A: ICD-10-CM

## 2022-02-21 DIAGNOSIS — I50.32 CHRONIC DIASTOLIC CONGESTIVE HEART FAILURE: ICD-10-CM

## 2022-02-21 DIAGNOSIS — N17.9 ACUTE RENAL FAILURE SUPERIMPOSED ON STAGE 3 CHRONIC KIDNEY DISEASE, UNSPECIFIED ACUTE RENAL FAILURE TYPE, UNSPECIFIED WHETHER STAGE 3A OR 3B CKD: ICD-10-CM

## 2022-02-21 DIAGNOSIS — R06.02 SHORTNESS OF BREATH: ICD-10-CM

## 2022-02-21 DIAGNOSIS — F41.9 ANXIETY: ICD-10-CM

## 2022-02-21 DIAGNOSIS — Z17.0 MALIGNANT NEOPLASM OF UPPER-OUTER QUADRANT OF RIGHT BREAST IN FEMALE, ESTROGEN RECEPTOR POSITIVE: Chronic | ICD-10-CM

## 2022-02-21 DIAGNOSIS — F32.1 MAJOR DEPRESSIVE DISORDER, SINGLE EPISODE, MODERATE WITH ANXIOUS DISTRESS: ICD-10-CM

## 2022-02-21 DIAGNOSIS — N18.30 ACUTE RENAL FAILURE SUPERIMPOSED ON STAGE 3 CHRONIC KIDNEY DISEASE, UNSPECIFIED ACUTE RENAL FAILURE TYPE, UNSPECIFIED WHETHER STAGE 3A OR 3B CKD: ICD-10-CM

## 2022-02-21 DIAGNOSIS — I26.99 ACUTE PULMONARY EMBOLISM, UNSPECIFIED PULMONARY EMBOLISM TYPE, UNSPECIFIED WHETHER ACUTE COR PULMONALE PRESENT: Primary | ICD-10-CM

## 2022-02-21 DIAGNOSIS — R60.9 SOFT TISSUE SWELLING OF BACK: ICD-10-CM

## 2022-02-21 DIAGNOSIS — C50.411 MALIGNANT NEOPLASM OF UPPER-OUTER QUADRANT OF RIGHT BREAST IN FEMALE, ESTROGEN RECEPTOR POSITIVE: Chronic | ICD-10-CM

## 2022-02-21 DIAGNOSIS — I10 PRIMARY HYPERTENSION: ICD-10-CM

## 2022-02-21 LAB
ANION GAP SERPL CALC-SCNC: 14 MMOL/L (ref 8–16)
BNP SERPL-MCNC: 211 PG/ML (ref 0–99)
BUN SERPL-MCNC: 24 MG/DL (ref 8–23)
CALCIUM SERPL-MCNC: 9.2 MG/DL (ref 8.7–10.5)
CHLORIDE SERPL-SCNC: 107 MMOL/L (ref 95–110)
CO2 SERPL-SCNC: 19 MMOL/L (ref 23–29)
CREAT SERPL-MCNC: 1.6 MG/DL (ref 0.5–1.4)
EST. GFR  (AFRICAN AMERICAN): 37 ML/MIN/1.73 M^2
EST. GFR  (NON AFRICAN AMERICAN): 32 ML/MIN/1.73 M^2
GLUCOSE SERPL-MCNC: 135 MG/DL (ref 70–110)
POTASSIUM SERPL-SCNC: 4.5 MMOL/L (ref 3.5–5.1)
SODIUM SERPL-SCNC: 140 MMOL/L (ref 136–145)

## 2022-02-21 PROCEDURE — 99499 UNLISTED E&M SERVICE: CPT | Mod: S$GLB,,, | Performed by: PHYSICIAN ASSISTANT

## 2022-02-21 PROCEDURE — 99496 TRANSJ CARE MGMT HIGH F2F 7D: CPT | Mod: S$GLB,,, | Performed by: PHYSICIAN ASSISTANT

## 2022-02-21 PROCEDURE — 99999 PR PBB SHADOW E&M-EST. PATIENT-LVL V: ICD-10-PCS | Mod: PBBFAC,,, | Performed by: PHYSICIAN ASSISTANT

## 2022-02-21 PROCEDURE — 83880 ASSAY OF NATRIURETIC PEPTIDE: CPT | Performed by: PHYSICIAN ASSISTANT

## 2022-02-21 PROCEDURE — 99499 RISK ADDL DX/OHS AUDIT: ICD-10-PCS | Mod: S$GLB,,, | Performed by: PHYSICIAN ASSISTANT

## 2022-02-21 PROCEDURE — 36415 COLL VENOUS BLD VENIPUNCTURE: CPT | Performed by: PHYSICIAN ASSISTANT

## 2022-02-21 PROCEDURE — 99215 OFFICE O/P EST HI 40 MIN: CPT | Mod: PBBFAC | Performed by: PHYSICIAN ASSISTANT

## 2022-02-21 PROCEDURE — 99999 PR PBB SHADOW E&M-EST. PATIENT-LVL V: CPT | Mod: PBBFAC,,, | Performed by: PHYSICIAN ASSISTANT

## 2022-02-21 PROCEDURE — 80048 BASIC METABOLIC PNL TOTAL CA: CPT | Performed by: PHYSICIAN ASSISTANT

## 2022-02-21 PROCEDURE — 99496 TRANSITIONAL CARE MANAGE SERVICE 7 DAY DISCHARGE: ICD-10-PCS | Mod: S$GLB,,, | Performed by: PHYSICIAN ASSISTANT

## 2022-02-21 RX ORDER — BUPROPION HYDROCHLORIDE 150 MG/1
150 TABLET ORAL DAILY
Qty: 30 TABLET | Refills: 1 | Status: SHIPPED | OUTPATIENT
Start: 2022-02-21 | End: 2022-02-21

## 2022-02-21 RX ORDER — VENLAFAXINE HYDROCHLORIDE 150 MG/1
150 CAPSULE, EXTENDED RELEASE ORAL DAILY
Qty: 30 CAPSULE | Refills: 1 | Status: SHIPPED | OUTPATIENT
Start: 2022-02-21 | End: 2022-04-06 | Stop reason: SDUPTHER

## 2022-02-21 NOTE — PROGRESS NOTES
Subjective:      Patient ID: Susu Luther is a 72 y.o. female.    Chief Complaint: Hospital Follow Up    Patient is new to me, being seen today for hospital follow up.     Patient admitted on 2/15 for SOB and chest discomfort.  Dx PE, TANNER.  Started on heparin drip inpatient and transitioned to elisquis on discharge (advised to start 2/23).  Referral placed for [facility], PT (d/t OA of R hip), has not yet heard back.    Weight 323lb on discharge, 316lb today    Patient was told to hold lasix/diuretics (10mg daily dose previously) d/t worsening renal function.  Patient did not hold medication, was unaware of these instructions, do not see it noted in discharge summary     Knot to R breast x3wks   Scheduled for Hem f/u 3/21    Takes xanax .5mg bid for anxiety/insomnia, has been out of medication x2wks  Reports taking more frequently during the day recently with all the recent stress  Fall in hospital, got caught on cord, fell on R side, x-rays completed  Also takes effexor 75mg for anxiety/depression, does feel she could benefit from increased dose   Has seen Psych in the past for therapy, not interested at this time     Reports Saturday wasn't feeling well- pain and SOB, feeling a bit better today but still without significant improvement since hospitalization     Requesting refill on percocet for pain, reports previously prescribed by Hem/Onc but last prescribed by Dr. Arias     Last visit Feb 2022 with BRANDIN Mcintosh.     Review of Systems   Constitutional: Negative for chills, diaphoresis and fever.   HENT: Negative for congestion, rhinorrhea and sore throat.    Respiratory: Positive for shortness of breath (with exertion, also at rest). Negative for cough and wheezing.    Cardiovascular: Positive for chest pain (R sided) and leg swelling.   Gastrointestinal: Negative for abdominal pain, constipation, diarrhea, nausea and vomiting.   Musculoskeletal: Positive for back pain.   Skin: Negative for  "rash.        +knot to R back, present prior to hospitalization, TTP   Neurological: Positive for headaches (chronic, previously took fioricet, CI with elisquis). Negative for dizziness and light-headedness.       Objective:   /86   Pulse 63   Temp 97.7 °F (36.5 °C) (Tympanic)   Resp 18   Ht 5' 9" (1.753 m)   Wt (!) 143.4 kg (316 lb 2.2 oz)   LMP  (LMP Unknown)   SpO2 95%   BMI 46.69 kg/m²   Physical Exam  Constitutional:       General: She is not in acute distress.     Appearance: Normal appearance. She is well-developed. She is not ill-appearing.   HENT:      Head: Normocephalic and atraumatic.   Cardiovascular:      Rate and Rhythm: Normal rate and regular rhythm.      Heart sounds: Normal heart sounds. No murmur heard.  Pulmonary:      Effort: Pulmonary effort is normal. No respiratory distress.      Breath sounds: Normal breath sounds. No decreased breath sounds.   Chest:       Musculoskeletal:        Back:       Right lower leg: No edema.      Left lower leg: No edema.   Skin:     General: Skin is warm and dry.      Findings: No rash.   Psychiatric:         Speech: Speech normal.         Behavior: Behavior normal.         Thought Content: Thought content normal.       Assessment:      1. Acute pulmonary embolism, unspecified pulmonary embolism type, unspecified whether acute cor pulmonale present    2. Acute renal failure superimposed on stage 3 chronic kidney disease, unspecified acute renal failure type, unspecified whether stage 3a or 3b CKD    3. Chronic diastolic congestive heart failure    4. Primary hypertension    5. Malignant neoplasm of upper-outer quadrant of right breast in female, estrogen receptor positive    6. Chronic kidney disease, stage 3a    7. Nonintractable headache, unspecified chronicity pattern, unspecified headache type    8. Shortness of breath    9. Leg swelling    10. Major depressive disorder, single episode, moderate with anxious distress    11. Soft tissue swelling " of back    12. Anxiety       Plan:   Acute pulmonary embolism, unspecified pulmonary embolism type, unspecified whether acute cor pulmonale present    Acute renal failure superimposed on stage 3 chronic kidney disease, unspecified acute renal failure type, unspecified whether stage 3a or 3b CKD  -     Basic Metabolic Panel; Future; Expected date: 02/21/2022  -     Basic Metabolic Panel; Future; Expected date: 02/22/2022    Chronic diastolic congestive heart failure  -     BNP; Future; Expected date: 02/21/2022  -     Ambulatory referral/consult to Cardiology; Future; Expected date: 02/28/2022  -     BNP; Future; Expected date: 02/22/2022    Primary hypertension    Malignant neoplasm of upper-outer quadrant of right breast in female, estrogen receptor positive    Chronic kidney disease, stage 3a  -     Basic Metabolic Panel; Future; Expected date: 02/21/2022    Nonintractable headache, unspecified chronicity pattern, unspecified headache type  -     Ambulatory referral/consult to Neurology; Future; Expected date: 02/28/2022    Shortness of breath  -     BNP; Future; Expected date: 02/21/2022    Leg swelling  -     BNP; Future; Expected date: 02/21/2022    Major depressive disorder, single episode, moderate with anxious distress  -     Discontinue: buPROPion (WELLBUTRIN XL) 150 MG TB24 tablet; Take 1 tablet (150 mg total) by mouth once daily.  Dispense: 30 tablet; Refill: 1  -     venlafaxine (EFFEXOR-XR) 150 MG Cp24; Take 1 capsule (150 mg total) by mouth once daily.  Dispense: 30 capsule; Refill: 1    Soft tissue swelling of back  -     US Soft Tissue Chest_Upper Back; Future; Expected date: 02/21/2022    Anxiety  -     venlafaxine (EFFEXOR-XR) 150 MG Cp24; Take 1 capsule (150 mg total) by mouth once daily.  Dispense: 30 capsule; Refill: 1      Sooner f/u with Hem post-hospitalization to be scheduled     Card f/u, has not been seen since Dec 2020    Will discuss percocet and xanax refills with provider covering   Hugo's in basket   Discussed she should only be taking medication as prescribed, will increase effexor dose d/t uncontrolled anxiety/depression     Discussed worsening signs/symptoms and when to return to clinic or go to ED.   Patient expresses understanding and agrees with treatment plan.     Transitional Care Note    Family and/or Caretaker present at visit?  Yes.  Diagnostic tests reviewed/disposition: I have reviewed all completed as well as pending diagnostic tests at the time of discharge.  Disease/illness education: Discussed   Home health/community services discussion/referrals: Patient has home health established at [facility].   Establishment or re-establishment of referral orders for community resources: No other necessary community resources.   Discussion with other health care providers: No discussion with other health care providers necessary.     Addendum 2/22: Patient was scheduled with Hem/Onc today, 2/22.  Appt was cancelled by patient.  Message sent to Dr. Clarke regarding recent visit and hospitalization.      Discussed patient with Dr. Kramer as PCP is currently out of office.  Patient needs to start elisquis.  Will hold diuretics d/t worsening kidney function.  1wk f/u with repeat BNP, BMP and weight check.  Keep follow up with Cardiology.

## 2022-02-22 ENCOUNTER — TELEPHONE (OUTPATIENT)
Dept: HEMATOLOGY/ONCOLOGY | Facility: CLINIC | Age: 72
End: 2022-02-22
Payer: MEDICARE

## 2022-02-22 ENCOUNTER — PATIENT MESSAGE (OUTPATIENT)
Dept: HEMATOLOGY/ONCOLOGY | Facility: CLINIC | Age: 72
End: 2022-02-22
Payer: MEDICARE

## 2022-02-22 DIAGNOSIS — Z17.0 MALIGNANT NEOPLASM OF UPPER-OUTER QUADRANT OF RIGHT BREAST IN FEMALE, ESTROGEN RECEPTOR POSITIVE: Chronic | ICD-10-CM

## 2022-02-22 DIAGNOSIS — F41.9 ANXIETY: ICD-10-CM

## 2022-02-22 DIAGNOSIS — C50.411 MALIGNANT NEOPLASM OF UPPER-OUTER QUADRANT OF RIGHT BREAST IN FEMALE, ESTROGEN RECEPTOR POSITIVE: Chronic | ICD-10-CM

## 2022-02-22 RX ORDER — ALPRAZOLAM 0.5 MG/1
0.5 TABLET ORAL 2 TIMES DAILY PRN
Qty: 60 TABLET | Refills: 0 | Status: SHIPPED | OUTPATIENT
Start: 2022-02-22 | End: 2022-04-27 | Stop reason: SDUPTHER

## 2022-02-22 RX ORDER — ALPRAZOLAM 0.5 MG/1
0.5 TABLET ORAL 2 TIMES DAILY PRN
Qty: 60 TABLET | Refills: 0 | Status: SHIPPED | OUTPATIENT
Start: 2022-02-22 | End: 2022-02-22 | Stop reason: SDUPTHER

## 2022-02-22 RX ORDER — BUTALBITAL, ACETAMINOPHEN AND CAFFEINE 50; 325; 40 MG/1; MG/1; MG/1
1 TABLET ORAL EVERY 4 HOURS PRN
Qty: 30 TABLET | Refills: 0 | OUTPATIENT
Start: 2022-02-22 | End: 2022-03-24

## 2022-02-22 RX ORDER — OXYCODONE AND ACETAMINOPHEN 7.5; 325 MG/1; MG/1
1 TABLET ORAL EVERY 4 HOURS PRN
Qty: 90 TABLET | Refills: 0 | OUTPATIENT
Start: 2022-02-22 | End: 2023-02-22

## 2022-02-22 RX ORDER — HYDROCODONE BITARTRATE AND ACETAMINOPHEN 7.5; 325 MG/1; MG/1
1 TABLET ORAL EVERY 6 HOURS PRN
Qty: 60 TABLET | Refills: 0 | Status: SHIPPED | OUTPATIENT
Start: 2022-02-22 | End: 2022-03-30

## 2022-02-22 NOTE — PROGRESS NOTES
Subjective:       Patient ID: Susu Luther is a 72 y.o. female.    Chief Complaint: No chief complaint on file.    HPI     Covering for PCP while away    Refill request for fiorcet, xanax, percocet    Very concerning drug interaction    Called discussed  Will refill xanax, but not other    I want to meet her and try and find alternative methods of controlling some of chronic conditions to get her on a safer regimen

## 2022-02-23 ENCOUNTER — PES CALL (OUTPATIENT)
Dept: ADMINISTRATIVE | Facility: CLINIC | Age: 72
End: 2022-02-23
Payer: MEDICARE

## 2022-02-24 ENCOUNTER — PATIENT MESSAGE (OUTPATIENT)
Dept: INTERNAL MEDICINE | Facility: CLINIC | Age: 72
End: 2022-02-24
Payer: MEDICARE

## 2022-02-24 ENCOUNTER — PES CALL (OUTPATIENT)
Dept: ADMINISTRATIVE | Facility: CLINIC | Age: 72
End: 2022-02-24
Payer: MEDICARE

## 2022-02-25 ENCOUNTER — TELEPHONE (OUTPATIENT)
Dept: HEMATOLOGY/ONCOLOGY | Facility: CLINIC | Age: 72
End: 2022-02-25
Payer: MEDICARE

## 2022-02-25 ENCOUNTER — PES CALL (OUTPATIENT)
Dept: ADMINISTRATIVE | Facility: CLINIC | Age: 72
End: 2022-02-25
Payer: MEDICARE

## 2022-02-28 ENCOUNTER — LAB VISIT (OUTPATIENT)
Dept: LAB | Facility: HOSPITAL | Age: 72
End: 2022-02-28
Attending: FAMILY MEDICINE
Payer: MEDICARE

## 2022-02-28 ENCOUNTER — OFFICE VISIT (OUTPATIENT)
Dept: CARDIOLOGY | Facility: CLINIC | Age: 72
End: 2022-02-28
Payer: OTHER GOVERNMENT

## 2022-02-28 VITALS
HEIGHT: 69 IN | WEIGHT: 293 LBS | OXYGEN SATURATION: 98 % | HEART RATE: 57 BPM | SYSTOLIC BLOOD PRESSURE: 102 MMHG | DIASTOLIC BLOOD PRESSURE: 60 MMHG | BODY MASS INDEX: 43.4 KG/M2

## 2022-02-28 DIAGNOSIS — R06.02 SOB (SHORTNESS OF BREATH): Primary | ICD-10-CM

## 2022-02-28 DIAGNOSIS — I71.40 ABDOMINAL AORTIC ANEURYSM (AAA) WITHOUT RUPTURE: ICD-10-CM

## 2022-02-28 DIAGNOSIS — C50.411 MALIGNANT NEOPLASM OF UPPER-OUTER QUADRANT OF RIGHT BREAST IN FEMALE, ESTROGEN RECEPTOR POSITIVE: Chronic | ICD-10-CM

## 2022-02-28 DIAGNOSIS — I50.32 CHRONIC DIASTOLIC CONGESTIVE HEART FAILURE: ICD-10-CM

## 2022-02-28 DIAGNOSIS — R07.89 OTHER CHEST PAIN: ICD-10-CM

## 2022-02-28 DIAGNOSIS — I26.99 OTHER ACUTE PULMONARY EMBOLISM WITHOUT ACUTE COR PULMONALE: ICD-10-CM

## 2022-02-28 DIAGNOSIS — E66.01 MORBID OBESITY WITH BMI OF 45.0-49.9, ADULT: ICD-10-CM

## 2022-02-28 DIAGNOSIS — I10 ESSENTIAL HYPERTENSION: ICD-10-CM

## 2022-02-28 DIAGNOSIS — Z17.0 MALIGNANT NEOPLASM OF UPPER-OUTER QUADRANT OF RIGHT BREAST IN FEMALE, ESTROGEN RECEPTOR POSITIVE: ICD-10-CM

## 2022-02-28 DIAGNOSIS — E78.5 HYPERLIPIDEMIA, UNSPECIFIED HYPERLIPIDEMIA TYPE: ICD-10-CM

## 2022-02-28 DIAGNOSIS — Z17.0 MALIGNANT NEOPLASM OF UPPER-OUTER QUADRANT OF RIGHT BREAST IN FEMALE, ESTROGEN RECEPTOR POSITIVE: Chronic | ICD-10-CM

## 2022-02-28 DIAGNOSIS — C50.411 MALIGNANT NEOPLASM OF UPPER-OUTER QUADRANT OF RIGHT BREAST IN FEMALE, ESTROGEN RECEPTOR POSITIVE: ICD-10-CM

## 2022-02-28 DIAGNOSIS — Z86.73 HISTORY OF CVA (CEREBROVASCULAR ACCIDENT): ICD-10-CM

## 2022-02-28 LAB
ALBUMIN SERPL BCP-MCNC: 3.3 G/DL (ref 3.5–5.2)
ALP SERPL-CCNC: 130 U/L (ref 55–135)
ALT SERPL W/O P-5'-P-CCNC: 10 U/L (ref 10–44)
ANION GAP SERPL CALC-SCNC: 12 MMOL/L (ref 8–16)
AST SERPL-CCNC: 12 U/L (ref 10–40)
BASOPHILS # BLD AUTO: 0.09 K/UL (ref 0–0.2)
BASOPHILS NFR BLD: 0.8 % (ref 0–1.9)
BILIRUB SERPL-MCNC: 0.2 MG/DL (ref 0.1–1)
BUN SERPL-MCNC: 18 MG/DL (ref 8–23)
CALCIUM SERPL-MCNC: 9.1 MG/DL (ref 8.7–10.5)
CHLORIDE SERPL-SCNC: 107 MMOL/L (ref 95–110)
CO2 SERPL-SCNC: 22 MMOL/L (ref 23–29)
CREAT SERPL-MCNC: 1.3 MG/DL (ref 0.5–1.4)
DIFFERENTIAL METHOD: ABNORMAL
EOSINOPHIL # BLD AUTO: 0.2 K/UL (ref 0–0.5)
EOSINOPHIL NFR BLD: 1.5 % (ref 0–8)
ERYTHROCYTE [DISTWIDTH] IN BLOOD BY AUTOMATED COUNT: 15.4 % (ref 11.5–14.5)
EST. GFR  (AFRICAN AMERICAN): 48 ML/MIN/1.73 M^2
EST. GFR  (NON AFRICAN AMERICAN): 42 ML/MIN/1.73 M^2
GLUCOSE SERPL-MCNC: 87 MG/DL (ref 70–110)
HCT VFR BLD AUTO: 37.9 % (ref 37–48.5)
HGB BLD-MCNC: 11.8 G/DL (ref 12–16)
IMM GRANULOCYTES # BLD AUTO: 0.04 K/UL (ref 0–0.04)
IMM GRANULOCYTES NFR BLD AUTO: 0.4 % (ref 0–0.5)
LYMPHOCYTES # BLD AUTO: 2.8 K/UL (ref 1–4.8)
LYMPHOCYTES NFR BLD: 26.2 % (ref 18–48)
MCH RBC QN AUTO: 25.3 PG (ref 27–31)
MCHC RBC AUTO-ENTMCNC: 31.1 G/DL (ref 32–36)
MCV RBC AUTO: 81 FL (ref 82–98)
MONOCYTES # BLD AUTO: 0.8 K/UL (ref 0.3–1)
MONOCYTES NFR BLD: 7 % (ref 4–15)
NEUTROPHILS # BLD AUTO: 6.8 K/UL (ref 1.8–7.7)
NEUTROPHILS NFR BLD: 64.1 % (ref 38–73)
NRBC BLD-RTO: 0 /100 WBC
PLATELET # BLD AUTO: 294 K/UL (ref 150–450)
PMV BLD AUTO: 8.7 FL (ref 9.2–12.9)
POTASSIUM SERPL-SCNC: 4.3 MMOL/L (ref 3.5–5.1)
PROT SERPL-MCNC: 7.2 G/DL (ref 6–8.4)
RBC # BLD AUTO: 4.66 M/UL (ref 4–5.4)
SODIUM SERPL-SCNC: 141 MMOL/L (ref 136–145)
WBC # BLD AUTO: 10.67 K/UL (ref 3.9–12.7)

## 2022-02-28 PROCEDURE — 85025 COMPLETE CBC W/AUTO DIFF WBC: CPT | Performed by: INTERNAL MEDICINE

## 2022-02-28 PROCEDURE — 99499 RISK ADDL DX/OHS AUDIT: ICD-10-PCS | Mod: S$GLB,,, | Performed by: STUDENT IN AN ORGANIZED HEALTH CARE EDUCATION/TRAINING PROGRAM

## 2022-02-28 PROCEDURE — 99214 PR OFFICE/OUTPT VISIT, EST, LEVL IV, 30-39 MIN: ICD-10-PCS | Mod: S$GLB,,, | Performed by: STUDENT IN AN ORGANIZED HEALTH CARE EDUCATION/TRAINING PROGRAM

## 2022-02-28 PROCEDURE — 80053 COMPREHEN METABOLIC PANEL: CPT | Performed by: INTERNAL MEDICINE

## 2022-02-28 PROCEDURE — 99214 OFFICE O/P EST MOD 30 MIN: CPT | Mod: S$GLB,,, | Performed by: STUDENT IN AN ORGANIZED HEALTH CARE EDUCATION/TRAINING PROGRAM

## 2022-02-28 PROCEDURE — 99215 OFFICE O/P EST HI 40 MIN: CPT | Mod: PBBFAC | Performed by: STUDENT IN AN ORGANIZED HEALTH CARE EDUCATION/TRAINING PROGRAM

## 2022-02-28 PROCEDURE — 36415 COLL VENOUS BLD VENIPUNCTURE: CPT | Performed by: INTERNAL MEDICINE

## 2022-02-28 PROCEDURE — 99999 PR PBB SHADOW E&M-EST. PATIENT-LVL V: CPT | Mod: PBBFAC,,, | Performed by: STUDENT IN AN ORGANIZED HEALTH CARE EDUCATION/TRAINING PROGRAM

## 2022-02-28 PROCEDURE — 99499 UNLISTED E&M SERVICE: CPT | Mod: S$GLB,,, | Performed by: STUDENT IN AN ORGANIZED HEALTH CARE EDUCATION/TRAINING PROGRAM

## 2022-02-28 PROCEDURE — 99999 PR PBB SHADOW E&M-EST. PATIENT-LVL V: ICD-10-PCS | Mod: PBBFAC,,, | Performed by: STUDENT IN AN ORGANIZED HEALTH CARE EDUCATION/TRAINING PROGRAM

## 2022-02-28 RX ORDER — BUTALBITAL, ACETAMINOPHEN AND CAFFEINE 50; 325; 40 MG/1; MG/1; MG/1
1 TABLET ORAL DAILY PRN
Qty: 30 TABLET | Refills: 0 | Status: SHIPPED | OUTPATIENT
Start: 2022-02-28 | End: 2022-03-30 | Stop reason: SDUPTHER

## 2022-02-28 NOTE — PROGRESS NOTES
Subjective:   Patient ID:  Susu Luther is a 72 y.o. female who presents for follow up of Congestive Heart Failure    12/1/20  71 yo female, care establish. Prior cardiologist Dr ackerman   [facility] HTN, CVA (2009), Right breast CA, Lumpectomy 3/2019, h/o PE off OAC 2 yrs ago.  AAA, s/p transcutaneous patch by Dr. Bonds, obesity knee OA imbalanced walker dependent  C/o SOB after walking few steps and dizziness. Had vision issue 1 month ago due to uncontrolled HTN  No chest pain  Sleeps with 2 pillows  Decent appetites  Leg calf pain worse at night  No smoking/drinking  ekg today NSR LVH.    ECH normal EF, grade II DD, LAE and PAP 56 mmHG   Chest CTA negative for PE  BP high    A1c controlled    S/p Open subpectoral lymph node biopsy on the right on 12/02/2020 by Dr. Starks  Still right chest, under arm and shoudler supersensitive pain      Congestive Heart Failure  Associated symptoms include shortness of breath. Pertinent negatives include no abdominal pain, chest pain, claudication, near-syncope or palpitations.     2/28/22  Patient was admitted to [facility] for shortness of breath.  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  Started on heparin drip in transition to oral anticoagulation, now on Eliquis.  Recurrent PE.  On Femara. Has another lump in breast on right side, recently seen on PET scan.   Due to TANNER, was told to stop hctz, telmisartan.  She has ran out of clonidine. Does not take the ASA, hydralazine, amlodipine.   Blood pressure normotensive.  Reports severe headaches.  Has been out of Fioricet.  Echocardiogram with normal EF  Reports shortness of breath, chest heaviness mostly right-sided.      Echo 2/28/22  · The left ventricle is normal in size with concentric hypertrophy and normal systolic function.  · The estimated ejection fraction is 60%.  · Normal left ventricular diastolic function.  · Normal right ventricular size with  normal right ventricular systolic function.  · Mild to moderate tricuspid regurgitation.  · Normal central venous pressure (3 mmHg).  · The estimated PA systolic pressure is 36 mmHg.       Echo 2019  CONCLUSIONS     1 - Mild left atrial enlargement.     2 - Concentric hypertrophy.     3 - No wall motion abnormalities.     4 - Normal left ventricular systolic function (EF 60-65%).     5 - Impaired LV relaxation, elevated LAP (grade 2 diastolic dysfunction).     6 - Normal right ventricular systolic function .     7 - The estimated PA systolic pressure is 36 mmHg.     8 - Mild tricuspid regurgitation.        Past Medical History:   Diagnosis Date    Chronic diastolic congestive heart failure 2020    Encounter for blood transfusion     History of repair of aneurysm of abdominal aorta using endovascular stent graft     DR Bonds     of psychiatric care     Hypertension     Major depressive disorder, single episode, moderate with anxious distress 2020    Malignant neoplasm of upper-outer quadrant of right breast in female, estrogen receptor positive 3/14/2019    radiation    Psychiatric problem     Sleep apnea     Sleep difficulties     Stroke     no residual defect    Therapy        Past Surgical History:   Procedure Laterality Date    APPENDECTOMY      AXILLARY NODE DISSECTION Right 2020    Procedure: LYMPHADENECTOMY, AXILLARY;  Surgeon: Artemio Starks MD;  Location: [facility] OR;  Service: General;  Laterality: Right;    BIOPSY OF AXILLARY NODE Right 3/2/2021    Procedure: BIOPSY, LYMPH NODE, AXILLARY;  Surgeon: Artemio Sutton MD;  Location: [address];  Service: General;  Laterality: Right;    BREAST BIOPSY          BREAST LUMPECTOMY      2019     SECTION      x 1    OOPHORECTOMY      right     SENTINEL LYMPH NODE BIOPSY Right 3/27/2019    Procedure: BIOPSY, LYMPH NODE, SENTINEL;  Surgeon: Artemio Starks MD;  Location: [facility] OR;  Service: General;   Laterality: Right;       Social History     Tobacco Use    Smoking status: Never Smoker    Smokeless tobacco: Never Used   Substance Use Topics    Alcohol use: No    Drug use: No       Family History   Problem Relation Age of Onset    Diabetes Maternal Grandmother     Diabetes Maternal Grandfather     Diabetes Mother     Sickle cell anemia Daughter     Breast cancer Neg Hx     Colon cancer Neg Hx     Ovarian cancer Neg Hx          Review of Systems   Constitutional: Negative for decreased appetite, diaphoresis, fever, malaise/fatigue and night sweats.   HENT: Negative for nosebleeds.    Eyes: Negative for blurred vision and double vision.   Cardiovascular: Positive for dyspnea on exertion. Negative for chest pain, claudication, irregular heartbeat, leg swelling, near-syncope, orthopnea, palpitations, paroxysmal nocturnal dyspnea and syncope.   Respiratory: Positive for shortness of breath. Negative for cough, sleep disturbances due to breathing, snoring, sputum production and wheezing.    Endocrine: Negative for cold intolerance and polyuria.   Hematologic/Lymphatic: Does not bruise/bleed easily.   Skin: Negative for rash.   Musculoskeletal: Negative for back pain, falls, joint pain, joint swelling and neck pain.        Right chest breast and shoulder pain   Gastrointestinal: Negative for abdominal pain, heartburn, nausea and vomiting.   Genitourinary: Negative for dysuria, frequency and hematuria.   Neurological: Negative for difficulty with concentration, dizziness, focal weakness, headaches, light-headedness, numbness, seizures and weakness.   Psychiatric/Behavioral: Negative for depression, memory loss and substance abuse. The patient does not have insomnia.    Allergic/Immunologic: Negative for HIV exposure and hives.       Objective:   Physical Exam  HENT:      Head: Normocephalic.   Eyes:      Pupils: Pupils are equal, round, and reactive to light.   Neck:      Thyroid: No thyromegaly.       Vascular: Normal carotid pulses. No carotid bruit or JVD.   Cardiovascular:      Rate and Rhythm: Normal rate and regular rhythm.  No extrasystoles are present.     Chest Wall: PMI is not displaced.      Pulses: Normal pulses.      Heart sounds: Normal heart sounds. No murmur heard.    No gallop. No S3 sounds.   Pulmonary:      Effort: No respiratory distress.      Breath sounds: Normal breath sounds. No stridor.   Abdominal:      General: Bowel sounds are normal.      Palpations: Abdomen is soft.      Tenderness: There is no abdominal tenderness. There is no rebound.   Musculoskeletal:         General: Normal range of motion.   Skin:     Findings: No rash.   Neurological:      Mental Status: She is alert and oriented to person, place, and time.   Psychiatric:         Behavior: Behavior normal.         Lab Results   Component Value Date    CHOL 221 (H) 10/19/2020    CHOL 231 (H) 08/25/2020    CHOL 229 (H) 05/18/2019     Lab Results   Component Value Date    HDL 55 10/19/2020    HDL 49 08/25/2020    HDL 43 05/18/2019     Lab Results   Component Value Date    LDLCALC 137.4 10/19/2020    LDLCALC 135.6 08/25/2020    LDLCALC 148.0 05/18/2019     Lab Results   Component Value Date    TRIG 143 10/19/2020    TRIG 232 (H) 08/25/2020    TRIG 190 (H) 05/18/2019     Lab Results   Component Value Date    CHOLHDL 24.9 10/19/2020    CHOLHDL 21.2 08/25/2020    CHOLHDL 18.8 (L) 05/18/2019       Chemistry        Component Value Date/Time     02/28/2022 1434    K 4.3 02/28/2022 1434     02/28/2022 1434    CO2 22 (L) 02/28/2022 1434    BUN 18 02/28/2022 1434    CREATININE 1.3 02/28/2022 1434    GLU 87 02/28/2022 1434        Component Value Date/Time    CALCIUM 9.1 02/28/2022 1434    ALKPHOS 130 02/28/2022 1434    AST 12 02/28/2022 1434    ALT 10 02/28/2022 1434    BILITOT 0.2 02/28/2022 1434    ESTGFRAFRICA 48 (A) 02/28/2022 1434    EGFRNONAA 42 (A) 02/28/2022 1434          Lab Results   Component Value Date    HGBA1C 5.9  (H) 05/18/2019     Lab Results   Component Value Date    TSH 1.460 10/18/2021     Lab Results   Component Value Date    INR 0.9 02/15/2022    INR 1.0 11/10/2020    INR 1.0 05/19/2019     Lab Results   Component Value Date    WBC 10.67 02/28/2022    HGB 11.8 (L) 02/28/2022    HCT 37.9 02/28/2022    MCV 81 (L) 02/28/2022     02/28/2022     BMP  Sodium   Date Value Ref Range Status   02/28/2022 141 136 - 145 mmol/L Final     Potassium   Date Value Ref Range Status   02/28/2022 4.3 3.5 - 5.1 mmol/L Final     Chloride   Date Value Ref Range Status   02/28/2022 107 95 - 110 mmol/L Final     CO2   Date Value Ref Range Status   02/28/2022 22 (L) 23 - 29 mmol/L Final     BUN   Date Value Ref Range Status   02/28/2022 18 8 - 23 mg/dL Final     Creatinine   Date Value Ref Range Status   02/28/2022 1.3 0.5 - 1.4 mg/dL Final     Calcium   Date Value Ref Range Status   02/28/2022 9.1 8.7 - 10.5 mg/dL Final     Anion Gap   Date Value Ref Range Status   02/28/2022 12 8 - 16 mmol/L Final     eGFR if    Date Value Ref Range Status   02/28/2022 48 (A) >60 mL/min/1.73 m^2 Final     eGFR if non    Date Value Ref Range Status   02/28/2022 42 (A) >60 mL/min/1.73 m^2 Final     Comment:     Calculation used to obtain the estimated glomerular filtration  rate (eGFR) is the CKD-EPI equation.        BNP  @LABRCNTIP(BNP,BNPTRIAGEBLO)@  @LABRCNTIP(troponini)@  Estimated Creatinine Clearance: 61.1 mL/min (based on SCr of 1.3 mg/dL).  No results found in the last 24 hours.  No results found in the last 24 hours.  No results found in the last 24 hours.    Assessment:      1. SOB (shortness of breath)    2. Chronic diastolic congestive heart failure    3. Essential hypertension    4. Morbid obesity with BMI of 45.0-49.9, adult    5. Other acute pulmonary embolism without acute cor pulmonale    6. Malignant neoplasm of upper-outer quadrant of right breast in female, estrogen receptor positive    7. Other chest  pain    8. History of CVA (cerebrovascular accident)    9. Abdominal aortic aneurysm (AAA) without rupture    10. Hyperlipidemia, unspecified hyperlipidemia type          Plan:     Recurrent pulmonary embolus  Reports shortness of breath, chest pain mostly right-sided  Continue OAC    Hypertension  Controlled  Continue holding antihypertensives    Diastolic heart failure  Reports shortness of breath, most likely due to PE  Restart Lasix    History of CVA  Denies symptoms  Currently not on aspirin as she is taking Eliquis  Continue statin    HLD   as of 2020  Continue statin  Lipid panel next visit    History of right breast cancer  Now has possible recurrent breast cancer  Follow-up with Hematology-Oncology    AAA s/p transcutaneous patch  Stable    Morbid obesity, BMI > 40  Low-salt, low-fat diet  Exercise as tolerated      All labs and cardiac procedures reviewed.    Return to clinic in 2 weeks    Arlene Hobbs MD  Cardiology Staff

## 2022-03-01 ENCOUNTER — PATIENT MESSAGE (OUTPATIENT)
Dept: INTERNAL MEDICINE | Facility: CLINIC | Age: 72
End: 2022-03-01
Payer: MEDICARE

## 2022-03-03 ENCOUNTER — OFFICE VISIT (OUTPATIENT)
Dept: HOME HEALTH SERVICES | Facility: CLINIC | Age: 72
End: 2022-03-03
Payer: MEDICARE

## 2022-03-03 VITALS
HEART RATE: 80 BPM | OXYGEN SATURATION: 98 % | RESPIRATION RATE: 18 BRPM | SYSTOLIC BLOOD PRESSURE: 112 MMHG | TEMPERATURE: 98 F | DIASTOLIC BLOOD PRESSURE: 70 MMHG

## 2022-03-03 DIAGNOSIS — I26.99 ACUTE PULMONARY EMBOLISM, UNSPECIFIED PULMONARY EMBOLISM TYPE, UNSPECIFIED WHETHER ACUTE COR PULMONALE PRESENT: Primary | ICD-10-CM

## 2022-03-03 DIAGNOSIS — C50.411 MALIGNANT NEOPLASM OF UPPER-OUTER QUADRANT OF RIGHT BREAST IN FEMALE, ESTROGEN RECEPTOR POSITIVE: Chronic | ICD-10-CM

## 2022-03-03 DIAGNOSIS — I50.32 CHRONIC DIASTOLIC CONGESTIVE HEART FAILURE: ICD-10-CM

## 2022-03-03 DIAGNOSIS — I10 PRIMARY HYPERTENSION: ICD-10-CM

## 2022-03-03 DIAGNOSIS — Z17.0 MALIGNANT NEOPLASM OF UPPER-OUTER QUADRANT OF RIGHT BREAST IN FEMALE, ESTROGEN RECEPTOR POSITIVE: Chronic | ICD-10-CM

## 2022-03-03 PROCEDURE — 99350 HOME/RES VST EST HIGH MDM 60: CPT | Mod: ,,, | Performed by: NURSE PRACTITIONER

## 2022-03-03 PROCEDURE — 99350 PR HOME VISIT,ESTAB PATIENT,LEVEL IV: ICD-10-PCS | Mod: ,,, | Performed by: NURSE PRACTITIONER

## 2022-03-04 ENCOUNTER — OFFICE VISIT (OUTPATIENT)
Dept: HEMATOLOGY/ONCOLOGY | Facility: CLINIC | Age: 72
End: 2022-03-04
Payer: MEDICARE

## 2022-03-04 VITALS
BODY MASS INDEX: 41.95 KG/M2 | SYSTOLIC BLOOD PRESSURE: 129 MMHG | TEMPERATURE: 97 F | WEIGHT: 293 LBS | HEIGHT: 70 IN | OXYGEN SATURATION: 98 % | HEART RATE: 73 BPM | DIASTOLIC BLOOD PRESSURE: 86 MMHG

## 2022-03-04 DIAGNOSIS — C50.411 MALIGNANT NEOPLASM OF UPPER-OUTER QUADRANT OF RIGHT BREAST IN FEMALE, ESTROGEN RECEPTOR POSITIVE: Chronic | ICD-10-CM

## 2022-03-04 DIAGNOSIS — I26.99 ACUTE PULMONARY EMBOLISM, UNSPECIFIED PULMONARY EMBOLISM TYPE, UNSPECIFIED WHETHER ACUTE COR PULMONALE PRESENT: ICD-10-CM

## 2022-03-04 DIAGNOSIS — Z17.0 MALIGNANT NEOPLASM OF UPPER-OUTER QUADRANT OF RIGHT BREAST IN FEMALE, ESTROGEN RECEPTOR POSITIVE: Chronic | ICD-10-CM

## 2022-03-04 PROCEDURE — 99215 OFFICE O/P EST HI 40 MIN: CPT | Mod: S$GLB,,, | Performed by: INTERNAL MEDICINE

## 2022-03-04 PROCEDURE — 3008F BODY MASS INDEX DOCD: CPT | Mod: CPTII,S$GLB,, | Performed by: INTERNAL MEDICINE

## 2022-03-04 PROCEDURE — 3288F FALL RISK ASSESSMENT DOCD: CPT | Mod: CPTII,S$GLB,, | Performed by: INTERNAL MEDICINE

## 2022-03-04 PROCEDURE — 3079F DIAST BP 80-89 MM HG: CPT | Mod: CPTII,S$GLB,, | Performed by: INTERNAL MEDICINE

## 2022-03-04 PROCEDURE — 99999 PR PBB SHADOW E&M-EST. PATIENT-LVL IV: CPT | Mod: PBBFAC,,, | Performed by: INTERNAL MEDICINE

## 2022-03-04 PROCEDURE — 1101F PT FALLS ASSESS-DOCD LE1/YR: CPT | Mod: CPTII,S$GLB,, | Performed by: INTERNAL MEDICINE

## 2022-03-04 PROCEDURE — 1101F PR PT FALLS ASSESS DOC 0-1 FALLS W/OUT INJ PAST YR: ICD-10-PCS | Mod: CPTII,S$GLB,, | Performed by: INTERNAL MEDICINE

## 2022-03-04 PROCEDURE — 1125F AMNT PAIN NOTED PAIN PRSNT: CPT | Mod: CPTII,S$GLB,, | Performed by: INTERNAL MEDICINE

## 2022-03-04 PROCEDURE — 1159F PR MEDICATION LIST DOCUMENTED IN MEDICAL RECORD: ICD-10-PCS | Mod: CPTII,S$GLB,, | Performed by: INTERNAL MEDICINE

## 2022-03-04 PROCEDURE — 99499 UNLISTED E&M SERVICE: CPT | Mod: S$GLB,,, | Performed by: INTERNAL MEDICINE

## 2022-03-04 PROCEDURE — 3288F PR FALLS RISK ASSESSMENT DOCUMENTED: ICD-10-PCS | Mod: CPTII,S$GLB,, | Performed by: INTERNAL MEDICINE

## 2022-03-04 PROCEDURE — 1125F PR PAIN SEVERITY QUANTIFIED, PAIN PRESENT: ICD-10-PCS | Mod: CPTII,S$GLB,, | Performed by: INTERNAL MEDICINE

## 2022-03-04 PROCEDURE — 4010F PR ACE/ARB THEARPY RXD/TAKEN: ICD-10-PCS | Mod: CPTII,S$GLB,, | Performed by: INTERNAL MEDICINE

## 2022-03-04 PROCEDURE — 1159F MED LIST DOCD IN RCRD: CPT | Mod: CPTII,S$GLB,, | Performed by: INTERNAL MEDICINE

## 2022-03-04 PROCEDURE — 99215 PR OFFICE/OUTPT VISIT, EST, LEVL V, 40-54 MIN: ICD-10-PCS | Mod: S$GLB,,, | Performed by: INTERNAL MEDICINE

## 2022-03-04 PROCEDURE — 4010F ACE/ARB THERAPY RXD/TAKEN: CPT | Mod: CPTII,S$GLB,, | Performed by: INTERNAL MEDICINE

## 2022-03-04 PROCEDURE — 99499 RISK ADDL DX/OHS AUDIT: ICD-10-PCS | Mod: S$GLB,,, | Performed by: INTERNAL MEDICINE

## 2022-03-04 PROCEDURE — 3008F PR BODY MASS INDEX (BMI) DOCUMENTED: ICD-10-PCS | Mod: CPTII,S$GLB,, | Performed by: INTERNAL MEDICINE

## 2022-03-04 PROCEDURE — 3074F SYST BP LT 130 MM HG: CPT | Mod: CPTII,S$GLB,, | Performed by: INTERNAL MEDICINE

## 2022-03-04 PROCEDURE — 3079F PR MOST RECENT DIASTOLIC BLOOD PRESSURE 80-89 MM HG: ICD-10-PCS | Mod: CPTII,S$GLB,, | Performed by: INTERNAL MEDICINE

## 2022-03-04 PROCEDURE — 99999 PR PBB SHADOW E&M-EST. PATIENT-LVL IV: ICD-10-PCS | Mod: PBBFAC,,, | Performed by: INTERNAL MEDICINE

## 2022-03-04 PROCEDURE — 3074F PR MOST RECENT SYSTOLIC BLOOD PRESSURE < 130 MM HG: ICD-10-PCS | Mod: CPTII,S$GLB,, | Performed by: INTERNAL MEDICINE

## 2022-03-04 NOTE — ASSESSMENT & PLAN NOTE
72-year-old female with stage IIA (pT2, pN0(sn), cM0, G2, ER+, CT-, HER2-) invasive lobular carcinoma of right breast, status post lumpectomy with sentinel lymph node biopsy in 2019.  Currently on Arimidex and tolerating well.  DEXA scan ordered.  Yet to be obtained.     Had extensive conversation with patient and  and reviewed previous imaging including MRI from February of 2020 and PET-CT scan from November of 2020.  Noted evidence of the persistence of the right subpectoral lymph node mass.      Although the avidity of this mass is concerning, multiple biopsies have been nondiagnostic for malignancy. Case was presented at the tumor conference and recommendation was to continue Arimidex with short-term interval imaging.     Patient was not satisfied with the above recommendations and met with me for 2nd opinion.  At that time we decided to refer her to a breast surgeon in [place] for further assessment.     Patient was seen by Dr. Sutton with breast surgery service will recommended breast MRI.     MRI was performed on 02/23/2021 and showed lymph node mass deep to the right pectoralis minor muscle measuring 6.6 x 4.7 x 2.9 cm. There were also adjacent satellite lymph nodes anteriorly to the dominant lymph node measured 1.1 x 0.8 cm and 0.6 x 0.4 cm.  Also described was a mass effect upon the right subclavian vein.  The mass does not in case or envelope the subclavian neurovascular structures.     Given the above findings, recommendation was made to excise the mass if not for diagnostic purposes for pain management.   Underwent lymph node excision on 03/01/2021, pathology returned back as lymph node with follicular hyperplasia. No evidence of metastatic carcinoma or lymphoma.     Patient had a PET scan that was ordered by her previous oncologist that was performed on 04/14/2021 that showed interim right axillary lymph node excision with large residual hypermetabolic right subpectoral lymph node mass.   Also noted interval development of subcentimeter mildly FDG avid left posterior cervical and supraclavicular lymph nodes. No FDG avid mediastinal or hilar nodes.No FDG avid pulmonary nodules/masses.      Had diagnostic bilateral mammography on 09/24/2021, results showed a left breast subcentimeter mass at the 9 o'clock position which is new when compared to mammography from 2019.  Will plan a short-interval mammogram in 6 months to re-assess left breast mass.There was no mammographic evidence of disease in the right breast.     Saw Dr. Sutton in [place] who recommended PET-CT scan. Results from PET-CT scan showed interval enlargement of subpectoral mass on the right with sustained highly FDG avidity.    Recommend continuing endocrine therapy. Advised to follow with Dr. Sutton to review PET scan from October, 2021.     Return in 12 weeks with repeat labs or sooner if needed.

## 2022-03-04 NOTE — ASSESSMENT & PLAN NOTE
Likely provoked due to hypercoagulable state from breast cancer and obesity.  Continue apixaban at 5 mg b.i.d..  Advised that she will be on long-term anticoagulation given recurrent thrombosis history as well as active malignancy.  Reviewed labs from 02/28/2022 and noted no concerning cytopenia.  Noted improved renal function.

## 2022-03-04 NOTE — PROGRESS NOTES
[facility]  Transition of Care Home Visit    Visit Date: 3/3/2022  Encounter Provider: Kacy VALERA  PCP:  Peace Arias MD    PRESENTING HISTORY      Patient ID: Susu Luther is a 72 y.o. female.    Consult Requested By:  Dr. Anatoliy Jacinto  Reason for Consult:  Hospital Follow Up    Susu is being seen at home due to physical debility that presents a taxing effort to leave the home, to mitigate high risk of hospital readmission and/or due to the limited availability of reliable or safe options for transportation to the point of access to the provider. Prior to treatment on this visit the chart was reviewed and patient verbal consent was obtained.      Chief Complaint: Transitional Care    Patient was admitted to hospital on 02/15 and discharged to home on 02/18    History of Present Illness: Ms. Susu Luther is a 72 y.o. female who was recently admitted to the hospital with a PMHx of chronic diastolic congestive heart failure, AAA, PE, history of repair of aneurysm of abdominal aorta using endovascular stent graft, and stroke who presents to the Emergency Department after being sent by Nirmala Ayala NP for hypotension, SOB, and chest pressure. Yesterday, the pt reports that her blood pressure was 170/110, but this morning at her PCP's office, they were not able to get a blood pressure reading, so the pt was referred to the ER. She reports being compliant with her blood pressure regimen of 3, 0.1 mg clonidine every morning, 1, 0.1 mg clonidine every afternoon, and 0.1 mg clonidine every night. For 1 month, the pt reports that she has been experiencing dizziness, chest pressure, SOB, BLE pain, and generalized weakness. She is also experiencing fatigue which onset yesterday and headache. Symptoms are constant and moderate in severity. No mitigating or exacerbating factors reported. Patient denies any fever, chills, blood in stool, N/V/D, melena, numbness, and all other sxs at this  time. No prior Tx reported. She denies being on blood thinners, but reports that she is on a diuretic. No further complaints or concerns at this time.            ___________________________________________________________________    Today:    HPI:  Patient was seen today for a hospital follow up. See hospital course for details. Upon arrival patient was ambulatory, AAOx3 in no acute distress. Patient was very anxious at onset of the visit. Patient had c/o of pain that was generalized in nature. After assessment patient seemed to be less anxious. She does c/o shortness of breath with ambulation but this is not a new complaint. VSS     Review of Systems   Constitutional: Positive for fatigue. Negative for appetite change and fever.   HENT: Negative.    Eyes: Negative.    Respiratory: Positive for cough and shortness of breath (with ambulation).    Cardiovascular: Negative for chest pain, palpitations and leg swelling.   Endocrine: Negative.    Genitourinary: Positive for difficulty urinating.   Musculoskeletal: Positive for arthralgias, gait problem and myalgias.   Skin: Negative.    Neurological: Positive for weakness. Negative for dizziness, syncope and light-headedness.   Psychiatric/Behavioral: The patient is nervous/anxious.        Assessments:  · Environmental: Patient lives in a single story home with her . There are no steps at the entrance, lighting is dim and temperature is comfortable.  · Functional Status: Patient is independent with ADLs  · Safety: Fall Precautions  · Nutritional: Adequate food in the home  · Home Health/DME/Supplies: No home health the patient has a walker     PAST HISTORY:     Past Medical History:   Diagnosis Date    Chronic diastolic congestive heart failure 8/25/2020    Encounter for blood transfusion     History of repair of aneurysm of abdominal aorta using endovascular stent graft     DR Bonds    Hx of psychiatric care     Hypertension     Major depressive disorder,  single episode, moderate with anxious distress 2020    Malignant neoplasm of upper-outer quadrant of right breast in female, estrogen receptor positive 3/14/2019    radiation    Psychiatric problem     Sleep apnea     Sleep difficulties     Stroke 2009    no residual defect    Therapy        Past Surgical History:   Procedure Laterality Date    APPENDECTOMY      AXILLARY NODE DISSECTION Right 2020    Procedure: LYMPHADENECTOMY, AXILLARY;  Surgeon: Artemio Starks MD;  Location: [facility];  Service: General;  Laterality: Right;    BIOPSY OF AXILLARY NODE Right 3/2/2021    Procedure: BIOPSY, LYMPH NODE, AXILLARY;  Surgeon: Artemio Sutton MD;  Location: [address];  Service: General;  Laterality: Right;    BREAST BIOPSY      2019    BREAST LUMPECTOMY      2019     SECTION      x 1    OOPHORECTOMY      right     SENTINEL LYMPH NODE BIOPSY Right 3/27/2019    Procedure: BIOPSY, LYMPH NODE, SENTINEL;  Surgeon: Artemio Starks MD;  Location: [facility] [place];  Service: General;  Laterality: Right;       Family History   Problem Relation Age of Onset    Diabetes Maternal Grandmother     Diabetes Maternal Grandfather     Diabetes Mother     Sickle cell anemia Daughter     Breast cancer Neg Hx     Colon cancer Neg Hx     Ovarian cancer Neg Hx        Social History     Socioeconomic History    Marital status:      Spouse name: Campos Luther    Number of children: 2   Tobacco Use    Smoking status: Never Smoker    Smokeless tobacco: Never Used   Substance and Sexual Activity    Alcohol use: No    Drug use: No    Sexual activity: Yes     Partners: Male     Birth control/protection: Post-menopausal       MEDICATIONS & ALLERGIES:     Current Outpatient Medications on File Prior to Visit   Medication Sig Dispense Refill    albuterol (VENTOLIN HFA) 90 mcg/actuation inhaler Inhale 2 puffs into the lungs every 4 (four) hours as needed for Shortness of Breath. 18 g 11     ALPRAZolam (XANAX) 0.5 MG tablet Take 1 tablet (0.5 mg total) by mouth 2 (two) times daily as needed for Insomnia or Anxiety. 60 tablet 0    apixaban (ELIQUIS) 5 mg Tab take 2 tabs twice a day for 4 days then Take 1 tablet (5 mg total) by mouth 2 (two) times daily. 136 tablet 1    aspirin (ECOTRIN) 81 MG EC tablet Take 81 mg by mouth once daily.      atorvastatin (LIPITOR) 40 MG tablet Take 1 tablet (40 mg total) by mouth once daily. 90 tablet 3    butalbital-acetaminophen-caffeine -40 mg (FIORICET, ESGIC) -40 mg per tablet Take 1 tablet by mouth daily as needed for Pain or Headaches. 30 tablet 0    cholecalciferol, vitamin D3, 1,250 mcg (50,000 unit) capsule Take 1 capsule (50,000 Units total) by mouth once a week. (Patient taking differently: Take 50,000 Units by mouth once a week. on Friday) 12 capsule 0    doxazosin (CARDURA) 4 MG tablet Take 1 tablet (4 mg total) by mouth every evening. 90 tablet 0    furosemide (LASIX) 20 MG tablet take 1/2 tablet by mouth once a day. 90 tablet 0    gabapentin (NEURONTIN) 600 MG tablet Take 1 tablet (600 mg total) by mouth 3 (three) times daily. (Patient taking differently: Take 600 mg by mouth 2 (two) times daily.) 270 tablet 1    hydroCHLOROthiazide (HYDRODIURIL) 50 MG tablet Take 1 tablet (50 mg total) by mouth once daily. 90 tablet 3    HYDROcodone-acetaminophen (NORCO) 7.5-325 mg per tablet Take 1 tablet by mouth every 6 (six) hours as needed for Pain. 60 tablet 0    hydrOXYzine HCL (ATARAX) 25 MG tablet Take 1 tablet (25 mg total) by mouth every evening. 60 tablet 0    letrozole (FEMARA) 2.5 mg Tab Take 1 tablet (2.5 mg total) by mouth once daily. 30 tablet 11    LIDOcaine (LIDODERM) 5 % Place 2 patches onto the skin once daily. Remove & Discard patch within 12 hours or as directed by MD Conner patch 1    multivitamin (THERAGRAN) per tablet Take 1 tablet by mouth once daily.      propranoloL (INDERAL) 40 MG tablet Take 1 tablet by mouth twice daily  60 tablet 5    telmisartan (MICARDIS) 80 MG Tab Take 1 tablet (80 mg total) by mouth once daily. 90 tablet 1    venlafaxine (EFFEXOR-XR) 150 MG Cp24 Take 1 capsule (150 mg total) by mouth once daily. 30 capsule 1    zolpidem (AMBIEN) 5 MG Tab Take 1 tablet (5 mg total) by mouth nightly as needed (sleep). 60 tablet 1     No current facility-administered medications on file prior to visit.        Review of patient's allergies indicates:   Allergen Reactions    Pcn [penicillins] Hives       OBJECTIVE:     Vital Signs:  Vitals:    03/03/22 1200   BP: 112/70   Pulse: 80   Resp: 18   Temp: 97.9 °F (36.6 °C)     There is no height or weight on file to calculate BMI.     Physical Exam:  Physical Exam  Constitutional:       General: She is not in acute distress.     Appearance: She is obese.   HENT:      Head: Normocephalic and atraumatic.      Nose: Nose normal.      Mouth/Throat:      Mouth: Mucous membranes are moist.   Eyes:      Pupils: Pupils are equal, round, and reactive to light.   Cardiovascular:      Rate and Rhythm: Normal rate and regular rhythm.      Pulses: Normal pulses.      Heart sounds: Normal heart sounds.   Pulmonary:      Effort: Pulmonary effort is normal.      Breath sounds: Normal breath sounds.   Abdominal:      General: Bowel sounds are normal.      Palpations: Abdomen is soft.      Tenderness: There is abdominal tenderness (right upper quadrant).   Musculoskeletal:      Cervical back: Normal range of motion.      Right lower leg: Edema present.      Left lower leg: Edema present.   Skin:     General: Skin is warm and dry.      Capillary Refill: Capillary refill takes less than 2 seconds.   Neurological:      General: No focal deficit present.      Mental Status: She is alert and oriented to person, place, and time.   Psychiatric:      Comments: Patient was anxious upon arrival but before leaving patient seemed more calm         Laboratory  Lab Results   Component Value Date    WBC 10.67  02/28/2022    HGB 11.8 (L) 02/28/2022    HCT 37.9 02/28/2022    MCV 81 (L) 02/28/2022     02/28/2022     Lab Results   Component Value Date    INR 0.9 02/15/2022    INR 1.0 11/10/2020    INR 1.0 05/19/2019     Lab Results   Component Value Date    HGBA1C 5.9 (H) 05/18/2019     No results for input(s): POCTGLUCOSE in the last 72 hours.    Diagnostic Results:      TRANSITION OF CARE:     [facility] Contact Note: 02/25/2022    Family and/or Caretaker present at visit?  No.  Diagnostic tests reviewed/disposition: No diagnosic tests pending after this hospitalization.  Disease/illness education: Pulmonary embolus  Home health/community services discussion/referrals: Patient does not have home health established from hospital visit.  They do not need home health.  If needed, we will set up home health for the patient.   Establishment or re-establishment of referral orders for community resources: No other necessary community resources.   Discussion with other health care providers: No discussion with other health care providers necessary.     Transition of Care Visit:     I have reviewed and updated the history and problem list.  I have reconciled the medication list.  I have discussed the hospitalization and current medical issues, prognosis and plans with the patient/family.  I  spent more than 50% of time discussing the care with the patient/family.  Total Face-to-Face Encounter: 60 minutes.    Medications Reconciliation:   I have reconciled the patient's home medications and discharge medications with the patient/family. I have updated all changes.  Refer to After-Visit Medication List.    ASSESSMENT & PLAN:     HIGH RISK CONDITION(S):  Pulmonary embolism, Hypertension, CHF    Susu was seen today for transitional care.    Diagnoses and all orders for this visit:    Pulmonary embolism, unspecified chronicity, unspecified pulmonary embolism type, unspecified whether acute cor pulmonale present    Acute  pulmonary embolism  -     Ambulatory referral/consult to [facility]    Malignant neoplasm of upper-outer quadrant of right breast in female, estrogen receptor positive    Chronic diastolic congestive heart failure    Primary hypertension    - [facility] NP to schedule follow-up visit with patient in 4 weeks or sooner if needed  - Continue all medications, treatments and therapies as ordered.   - Follow all instructions, recommendations as discussed.  - Maintain Safety Precautions at all times.  - Attend all medical appointments as scheduled.  - For worsening symptoms: call Primary Care Physician or Nurse Practitioner.  - For emergencies, call 911 or immediately report to the nearest emergency room.    Were controlled substances prescribed?  No    Instructions for the patient:    Scheduled Follow-up :  Future Appointments   Date Time Provider Department Center   3/4/2022 11:20 AM Jose Clarke MD [facility] HEM ONC [facility]   3/9/2022  3:20 PM Kayleigh Kramer MD ON IM [facility]   3/14/2022  1:40 PM Arlene Hobbs MD ON CARDIO [facility]   3/17/2022 10:45 AM [facility] US1 [facility] [place]   3/17/2022  2:00 PM ONLH MAMMO1 ONLH MAMMO [place]   3/17/2022  2:45 PM ONLH US1 ONLH ULSOUND [place]   4/27/2022 10:40 AM Peace Arias MD ON IM [facility]   6/3/2022  9:00 AM Stephon Adamson MD [facility] RHEUM [place]       After Visit Medication List :     Medication List          Accurate as of March 3, 2022  9:34 PM. If you have any questions, ask your nurse or doctor.            CHANGE how you take these medications    cholecalciferol (vitamin D3) 1,250 mcg (50,000 unit) capsule  Take 1 capsule (50,000 Units total) by mouth once a week.  What changed: additional instructions     gabapentin 600 MG tablet  Commonly known as: NEURONTIN  Take 1 tablet (600 mg total) by mouth 3 (three) times daily.  What changed: when to take this        CONTINUE taking these medications    albuterol 90  mcg/actuation inhaler  Commonly known as: VENTOLIN HFA  Inhale 2 puffs into the lungs every 4 (four) hours as needed for Shortness of Breath.     ALPRAZolam 0.5 MG tablet  Commonly known as: XANAX  Take 1 tablet (0.5 mg total) by mouth 2 (two) times daily as needed for Insomnia or Anxiety.     aspirin 81 MG EC tablet  Commonly known as: ECOTRIN     atorvastatin 40 MG tablet  Commonly known as: LIPITOR  Take 1 tablet (40 mg total) by mouth once daily.     butalbital-acetaminophen-caffeine -40 mg -40 mg per tablet  Commonly known as: FIORICET, ESGIC  Take 1 tablet by mouth daily as needed for Pain or Headaches.     doxazosin 4 MG tablet  Commonly known as: CARDURA  Take 1 tablet (4 mg total) by mouth every evening.     ELIQUIS 5 mg Tab  Generic drug: apixaban  take 2 tabs twice a day for 4 days then Take 1 tablet (5 mg total) by mouth 2 (two) times daily.     furosemide 20 MG tablet  Commonly known as: LASIX  take 1/2 tablet by mouth once a day.     hydroCHLOROthiazide 50 MG tablet  Commonly known as: HYDRODIURIL  Take 1 tablet (50 mg total) by mouth once daily.     HYDROcodone-acetaminophen 7.5-325 mg per tablet  Commonly known as: NORCO  Take 1 tablet by mouth every 6 (six) hours as needed for Pain.     hydrOXYzine HCL 25 MG tablet  Commonly known as: ATARAX  Take 1 tablet (25 mg total) by mouth every evening.     letrozole 2.5 mg Tab  Commonly known as: FEMARA  Take 1 tablet (2.5 mg total) by mouth once daily.     LIDOcaine 5 %  Commonly known as: LIDODERM  Place 2 patches onto the skin once daily. Remove & Discard patch within 12 hours or as directed by MD     multivitamin per tablet  Commonly known as: THERAGRAN     propranoloL 40 MG tablet  Commonly known as: INDERAL  Take 1 tablet by mouth twice daily     telmisartan 80 MG Tab  Commonly known as: MICARDIS  Take 1 tablet (80 mg total) by mouth once daily.     venlafaxine 150 MG Cp24  Commonly known as: EFFEXOR-XR  Take 1 capsule (150 mg total) by  mouth once daily.     zolpidem 5 MG Tab  Commonly known as: AMBIEN  Take 1 tablet (5 mg total) by mouth nightly as needed (sleep).            Signature:

## 2022-03-04 NOTE — PROGRESS NOTES
Subjective:      DATE OF VISIT: 3/4/22     ?  Patient ID:?Susu Luther is a 72 y.o. female.?? MR#: [id number]   ?   PRIMARY ONCOLOGIST: Dr. Huynh --> Dr. Boles    ? Primary Care Providers:  Peace Arias MD, MD (General)     CHIEF COMPLAINT: ?  Follow-up, discussion of multidisciplinary tumor board consensus recommendation?? ?   ?   ONCOLOGIC DIAGNOSIS:  Stage IIA, pT2 pN0 cM0 grade 2 ER positive, WI negative, HER2 negative right breast invasive lobular carcinoma   ?   CURRENT TREATMENT:  Anastrozole    PAST TREATMENT:  Lumpectomy and sentinel lymph node biopsy, right, Dr. Starks, 02/28/2019  ?   ONCOLOGIC HISTORY:   ?   Oncology History   Malignant neoplasm of upper-outer quadrant of right breast in female, estrogen receptor positive   3/14/2019 Initial Diagnosis    Malignant neoplasm of upper-outer quadrant of right breast in female, estrogen receptor positive     3/27/2019 Cancer Staged    Staging form: Breast, AJCC 8th Edition  - Pathologic stage from 3/27/2019: Stage IIA (pT2, pN0(sn), cM0, G2, ER+, WI-, HER2-) - Signed by Guido Huynh MD on 4/4/2019 5/20/2019 - 6/18/2019 Radiation Therapy    Treatment Site Ref. ID Energy Dose/Fx (Gy) #Fx Dose Correction (Gy) Total Dose (Gy) Start Date End Date Elapsed Days   Boost Boost 18X 2.5 4 / 4 0 10 6/13/2019 6/18/2019 5   RtBreastAddMV Rt Breast 18X/6X 2.66 16 / 16 0 42.56 5/20/2019 6/12/2019 23            Cancer Staging  Malignant neoplasm of upper-outer quadrant of right breast in female, estrogen receptor positive  - Pathologic stage from 3/27/2019: Stage IIA (pT2, pN0(sn), cM0, G2, ER+, WI-, HER2-) - Signed by Guido Huynh MD on 4/4/2019        HPI    I had the pleasure of meeting Ms. Luther who is a 72-year-old obese  female with stage IIA (pT2, pN0(sn), cM0, G2, ER+, WI-, HER2-) invasive lobular carcinoma of right breast, status post lumpectomy with sentinel lymph node biopsy in 2019.  She has since been on aromatase inhibitor  and has been tolerating well.    Restaging PET-CT scan performed on 11/04/2020 revealed large hypermetabolic right subpectoral lymph node mass measuring 6.9 x 3.2 cm with SUV max of 13.0.    Right chest wall lymph node biopsy performed on 11/10/2020 revealed fragments of benign lymph node with no evidence of malignancy.  Excisional biopsy of right subpectoral lymph node was again performed on 12/02/2020 and returned as benign with no evidence of metastatic disease.    Case was discussed at the tumor conference and recommendation was to continue aromatase inhibitor with plan for repeat short interval imaging to reassess the right subpectoral lymph node mass.  This was discussed with patient however she was not satisfied with the recommendation and wanted a 2nd opinion with a different oncologist.    During previous visit with me, we decided to refer to breast surgeon in [facility]. She had MRI and has a planned surgical resection on 3/1/21.   Status post  Lymph node excision on 03/01/2021.       Interval Hx: 73 yo female with Stage IIA, pT2 pN0 cM0 grade 2 ER positive, MD negative, HER2 negative right breast invasive lobular carcinoma, s/p lumpectomy and sentinel LN sampling, currently on endocrine therapy. Presents today for routine follow up. Had recent PE, currently on eliquis.     She c/o left breast lump, had mammogram and US breast on 8/24/21 which showed a subcentimeter left breast mass. Denies shortness of breath, chest pain, fever, chills,diarrhea or dysuria.    Review of Systems    ?   A comprehensive 14-point review of systems was reviewed with patient and was negative other than as specified above.   ?   PAST MEDICAL HISTORY:   Past Medical History:   Diagnosis Date    Chronic diastolic congestive heart failure 8/25/2020    Encounter for blood transfusion     History of repair of aneurysm of abdominal aorta using endovascular stent graft     DR Bonds    Hx of psychiatric care     Hypertension     Major  depressive disorder, single episode, moderate with anxious distress 2020    Malignant neoplasm of upper-outer quadrant of right breast in female, estrogen receptor positive 3/14/2019    radiation    Psychiatric problem     Sleep apnea     Sleep difficulties     Stroke 2009    no residual defect    Therapy     ?     PAST SURGICAL HISTORY:   Past Surgical History:   Procedure Laterality Date    APPENDECTOMY      AXILLARY NODE DISSECTION Right 2020    Procedure: LYMPHADENECTOMY, AXILLARY;  Surgeon: Artemio Starks MD;  Location: [facility] OR;  Service: General;  Laterality: Right;    BIOPSY OF AXILLARY NODE Right 3/2/2021    Procedure: BIOPSY, LYMPH NODE, AXILLARY;  Surgeon: Artemio Sutton MD;  Location: [address];  Service: General;  Laterality: Right;    BREAST BIOPSY      2019    BREAST LUMPECTOMY      2019     SECTION      x 1    OOPHORECTOMY      right     SENTINEL LYMPH NODE BIOPSY Right 3/27/2019    Procedure: BIOPSY, LYMPH NODE, SENTINEL;  Surgeon: Artemio tSarks MD;  Location: [facility] OR;  Service: General;  Laterality: Right;      ?   ALLERGIES:   Allergies as of 2022 - Reviewed 2022   Allergen Reaction Noted    Pcn [penicillins] Hives 2019      ?   MEDICATIONS:?   Outpatient Medications Marked as Taking for the 3/4/22 encounter (Office Visit) with Jose Clarke MD   Medication Sig Dispense Refill    albuterol (VENTOLIN HFA) 90 mcg/actuation inhaler Inhale 2 puffs into the lungs every 4 (four) hours as needed for Shortness of Breath. 18 g 11    ALPRAZolam (XANAX) 0.5 MG tablet Take 1 tablet (0.5 mg total) by mouth 2 (two) times daily as needed for Insomnia or Anxiety. 60 tablet 0    apixaban (ELIQUIS) 5 mg Tab take 2 tabs twice a day for 4 days then Take 1 tablet (5 mg total) by mouth 2 (two) times daily. 136 tablet 1    atorvastatin (LIPITOR) 40 MG tablet Take 1 tablet (40 mg total) by mouth once daily. 90 tablet 3     butalbital-acetaminophen-caffeine -40 mg (FIORICET, ESGIC) -40 mg per tablet Take 1 tablet by mouth daily as needed for Pain or Headaches. 30 tablet 0    cholecalciferol, vitamin D3, 1,250 mcg (50,000 unit) capsule Take 1 capsule (50,000 Units total) by mouth once a week. (Patient taking differently: Take 50,000 Units by mouth once a week. on Friday) 12 capsule 0    doxazosin (CARDURA) 4 MG tablet Take 1 tablet (4 mg total) by mouth every evening. 90 tablet 0    furosemide (LASIX) 20 MG tablet take 1/2 tablet by mouth once a day. 90 tablet 0    gabapentin (NEURONTIN) 600 MG tablet Take 1 tablet (600 mg total) by mouth 3 (three) times daily. (Patient taking differently: Take 600 mg by mouth 2 (two) times daily.) 270 tablet 1    hydroCHLOROthiazide (HYDRODIURIL) 50 MG tablet Take 1 tablet (50 mg total) by mouth once daily. 90 tablet 3    HYDROcodone-acetaminophen (NORCO) 7.5-325 mg per tablet Take 1 tablet by mouth every 6 (six) hours as needed for Pain. 60 tablet 0    hydrOXYzine HCL (ATARAX) 25 MG tablet Take 1 tablet (25 mg total) by mouth every evening. 60 tablet 0    letrozole (FEMARA) 2.5 mg Tab Take 1 tablet (2.5 mg total) by mouth once daily. 30 tablet 11    LIDOcaine (LIDODERM) 5 % Place 2 patches onto the skin once daily. Remove & Discard patch within 12 hours or as directed by MD 90 patch 1    multivitamin (THERAGRAN) per tablet Take 1 tablet by mouth once daily.      propranoloL (INDERAL) 40 MG tablet Take 1 tablet by mouth twice daily 60 tablet 5    telmisartan (MICARDIS) 80 MG Tab Take 1 tablet (80 mg total) by mouth once daily. 90 tablet 1    venlafaxine (EFFEXOR-XR) 150 MG Cp24 Take 1 capsule (150 mg total) by mouth once daily. 30 capsule 1    zolpidem (AMBIEN) 5 MG Tab Take 1 tablet (5 mg total) by mouth nightly as needed (sleep). 60 tablet 1      ?   SOCIAL HISTORY:?   Social History     Tobacco Use    Smoking status: Never Smoker    Smokeless tobacco: Never Used    Substance Use Topics    Alcohol use: No      ?      ?   FAMILY HISTORY:   family history includes Diabetes in her maternal grandfather, maternal grandmother, and mother; Sickle cell anemia in her daughter.   ?        Objective:      Physical Exam      ?   Vitals:    03/04/22 1119   BP: 129/86   Pulse: 73   Temp: 97 °F (36.1 °C)      ?   ECOG:?1   General appearance: in no acute distress.   Head, eyes, ears, nose, and throat:  Sclerae anicteric  Respiratory:  No increased work of breathing  Abdomen:  ObeseExtremities: Warm, without edema.   Neurologic: Alert and oriented. Grossly normal strength, coordination, and gait.   Skin: No rashes, ecchymoses or petechial lesion.    Psychiatric:  Anxious, tearful       ?   Laboratory:  ?   No visits with results within 1 Day(s) from this visit.   Latest known visit with results is:   Lab Visit on 02/28/2022   Component Date Value Ref Range Status    WBC 02/28/2022 10.67  3.90 - 12.70 K/uL Final    RBC 02/28/2022 4.66  4.00 - 5.40 M/uL Final    Hemoglobin 02/28/2022 11.8 (A) 12.0 - 16.0 g/dL Final    Hematocrit 02/28/2022 37.9  37.0 - 48.5 % Final    MCV 02/28/2022 81 (A) 82 - 98 fL Final    MCH 02/28/2022 25.3 (A) 27.0 - 31.0 pg Final    MCHC 02/28/2022 31.1 (A) 32.0 - 36.0 g/dL Final    RDW 02/28/2022 15.4 (A) 11.5 - 14.5 % Final    Platelets 02/28/2022 294  150 - 450 K/uL Final    MPV 02/28/2022 8.7 (A) 9.2 - 12.9 fL Final    Immature Granulocytes 02/28/2022 0.4  0.0 - 0.5 % Final    Gran # (ANC) 02/28/2022 6.8  1.8 - 7.7 K/uL Final    Immature Grans (Abs) 02/28/2022 0.04  0.00 - 0.04 K/uL Final    Lymph # 02/28/2022 2.8  1.0 - 4.8 K/uL Final    Mono # 02/28/2022 0.8  0.3 - 1.0 K/uL Final    Eos # 02/28/2022 0.2  0.0 - 0.5 K/uL Final    Baso # 02/28/2022 0.09  0.00 - 0.20 K/uL Final    nRBC 02/28/2022 0  0 /100 WBC Final    Gran % 02/28/2022 64.1  38.0 - 73.0 % Final    Lymph % 02/28/2022 26.2  18.0 - 48.0 % Final    Mono % 02/28/2022 7.0  4.0 - 15.0  % Final    Eosinophil % 02/28/2022 1.5  0.0 - 8.0 % Final    Basophil % 02/28/2022 0.8  0.0 - 1.9 % Final    Differential Method 02/28/2022 Automated   Final    Sodium 02/28/2022 141  136 - 145 mmol/L Final    Potassium 02/28/2022 4.3  3.5 - 5.1 mmol/L Final    Chloride 02/28/2022 107  95 - 110 mmol/L Final    CO2 02/28/2022 22 (A) 23 - 29 mmol/L Final    Glucose 02/28/2022 87  70 - 110 mg/dL Final    BUN 02/28/2022 18  8 - 23 mg/dL Final    Creatinine 02/28/2022 1.3  0.5 - 1.4 mg/dL Final    Calcium 02/28/2022 9.1  8.7 - 10.5 mg/dL Final    Total Protein 02/28/2022 7.2  6.0 - 8.4 g/dL Final    Albumin 02/28/2022 3.3 (A) 3.5 - 5.2 g/dL Final    Total Bilirubin 02/28/2022 0.2  0.1 - 1.0 mg/dL Final    Alkaline Phosphatase 02/28/2022 130  55 - 135 U/L Final    AST 02/28/2022 12  10 - 40 U/L Final    ALT 02/28/2022 10  10 - 44 U/L Final    Anion Gap 02/28/2022 12  8 - 16 mmol/L Final    eGFR if  02/28/2022 48 (A) >60 mL/min/1.73 m^2 Final    eGFR if non  02/28/2022 42 (A) >60 mL/min/1.73 m^2 Final        ?   Imaging:  ?    No results found. However, due to the size of the patient record, not all encounters were searched. Please check Results Review for a complete set of results.  No results found. However, due to the size of the patient record, not all encounters were searched. Please check Results Review for a complete set of results.  No results found. However, due to the size of the patient record, not all encounters were searched. Please check Results Review for a complete set of results.      Pathology:    I have reviewed the patient's medical history in detail and updated the computerized patient record.       ?   Assessment/Plan:   Acute pulmonary embolism, unspecified pulmonary embolism type, unspecified whether acute cor pulmonale present  -     CBC Auto Differential; Future; Expected date: 03/04/2022  -     Comprehensive Metabolic Panel; Future; Expected  date: 03/04/2022  -     apixaban (ELIQUIS) 5 mg Tab; Take 1 tablet (5 mg total) by mouth 2 (two) times daily.  Dispense: 90 tablet; Refill: 3    Malignant neoplasm of upper-outer quadrant of right breast in female, estrogen receptor positive  -     CBC Auto Differential; Future; Expected date: 03/04/2022  -     Comprehensive Metabolic Panel; Future; Expected date: 03/04/2022       1. Acute pulmonary embolism, unspecified pulmonary embolism type, unspecified whether acute cor pulmonale present    2. Malignant neoplasm of upper-outer quadrant of right breast in female, estrogen receptor positive          Plan:     Acute pulmonary embolism  Likely provoked due to hypercoagulable state from breast cancer and obesity.  Continue apixaban at 5 mg b.i.d..  Advised that she will be on long-term anticoagulation given recurrent thrombosis history as well as active malignancy.  Reviewed labs from 02/28/2022 and noted no concerning cytopenia.  Noted improved renal function.    Malignant neoplasm of upper-outer quadrant of right breast in female, estrogen receptor positive  72-year-old female with stage IIA (pT2, pN0(sn), cM0, G2, ER+, [place]-, HER2-) invasive lobular carcinoma of right breast, status post lumpectomy with sentinel lymph node biopsy in 2019.  Currently on Arimidex and tolerating well.  DEXA scan ordered.  Yet to be obtained.     Had extensive conversation with patient and  and reviewed previous imaging including MRI from February of 2020 and PET-CT scan from November of 2020.  Noted evidence of the persistence of the right subpectoral lymph node mass.      Although the avidity of this mass is concerning, multiple biopsies have been nondiagnostic for malignancy. Case was presented at the tumor conference and recommendation was to continue Arimidex with short-term interval imaging.     Patient was not satisfied with the above recommendations and met with me for 2nd opinion.  At that time we decided to refer her  to a breast surgeon in [place] for further assessment.     Patient was seen by Dr. Sutton with breast surgery service will recommended breast MRI.     MRI was performed on 02/23/2021 and showed lymph node mass deep to the right pectoralis minor muscle measuring 6.6 x 4.7 x 2.9 cm. There were also adjacent satellite lymph nodes anteriorly to the dominant lymph node measured 1.1 x 0.8 cm and 0.6 x 0.4 cm.  Also described was a mass effect upon the right subclavian vein.  The mass does not in case or envelope the subclavian neurovascular structures.     Given the above findings, recommendation was made to excise the mass if not for diagnostic purposes for pain management.   Underwent lymph node excision on 03/01/2021, pathology returned back as lymph node with follicular hyperplasia. No evidence of metastatic carcinoma or lymphoma.     Patient had a PET scan that was ordered by her previous oncologist that was performed on 04/14/2021 that showed interim right axillary lymph node excision with large residual hypermetabolic right subpectoral lymph node mass.  Also noted interval development of subcentimeter mildly FDG avid left posterior cervical and supraclavicular lymph nodes. No FDG avid mediastinal or hilar nodes.No FDG avid pulmonary nodules/masses.      Had diagnostic bilateral mammography on 09/24/2021, results showed a left breast subcentimeter mass at the 9 o'clock position which is new when compared to mammography from 2019.  Will plan a short-interval mammogram in 6 months to re-assess left breast mass.There was no mammographic evidence of disease in the right breast.     Saw Dr. Sutton in [place] who recommended PET-CT scan. Results from PET-CT scan showed interval enlargement of subpectoral mass on the right with sustained highly FDG avidity.    Recommend continuing endocrine therapy. Advised to follow with Dr. Sutton to review PET scan from October, 2021.     Return in 12 weeks with repeat labs  or sooner if needed.    Acute pulmonary embolism, unspecified pulmonary embolism type, unspecified whether acute cor pulmonale present  -     CBC Auto Differential; Future; Expected date: 03/04/2022  -     Comprehensive Metabolic Panel; Future; Expected date: 03/04/2022  -     apixaban (ELIQUIS) 5 mg Tab; Take 1 tablet (5 mg total) by mouth 2 (two) times daily.  Dispense: 90 tablet; Refill: 3    Malignant neoplasm of upper-outer quadrant of right breast in female, estrogen receptor positive  -     CBC Auto Differential; Future; Expected date: 03/04/2022  -     Comprehensive Metabolic Panel; Future; Expected date: 03/04/2022        Jose Clarke MD

## 2022-03-07 ENCOUNTER — PATIENT MESSAGE (OUTPATIENT)
Dept: HEMATOLOGY/ONCOLOGY | Facility: CLINIC | Age: 72
End: 2022-03-07
Payer: MEDICARE

## 2022-03-07 ENCOUNTER — PATIENT MESSAGE (OUTPATIENT)
Dept: SURGERY | Facility: CLINIC | Age: 72
End: 2022-03-07
Payer: MEDICARE

## 2022-03-08 DIAGNOSIS — R22.31 AXILLARY MASS, RIGHT: Primary | ICD-10-CM

## 2022-03-08 NOTE — PROGRESS NOTES
Susu Luther  03/09/2022  [id number]    Peace Arias MD  Patient Care Team:  Peace Arias MD as PCP - General (Internal Medicine)  Wilda Lopez LMSW as  (Hematology and Oncology)  Faizan Arreguin II, MD (Inactive) as Consulting Physician (Radiation Oncology)  Guido Huynh MD (Inactive) as Consulting Physician (Hematology and Oncology)  Artemio Starks MD as Consulting Physician (General Surgery)  Elana Couch LPN as Care Coordinator  Janet Jain NP as Nurse Practitioner (Obstetrics and Gynecology)  Jerica Shelby as Digital Medicine Health   Giovanna SandovalD as Hypertension Digital Medicine Clinician (Pharmacist)  Jarad Gonzalez MD as Hypertension Digital Medicine Responsible Provider (Cardiology)  Jose Clarke MD as Consulting Physician (Hematology and Oncology)  Tomasz Cage MD as Consulting Physician (Internal Medicine)  Medicare Shared Savings Program as Hypertension Digital Medicine Contract  Has the patient seen any provider outside of the [facility] network since the last visit? (no). If yes, HIPPA forms completed and records requested.        Visit Type:a scheduled routine follow-up visit    Chief Complaint:  Chief Complaint   Patient presents with    Follow-up     She said she has a headache she said the palms of her hands are red and she doesn't know why       History of Present Illness:  Admitted 2/15 for TANNER, PE.  She had elevated Cr, 1.7  SO they did VQ scan with intermediate large defect.   Transitioned over from Heparin to  Eliquis.    She had ECHO done.  RV strain with PE    She was known to have Diasotlic HF- She was given hydration initally for CR, then some lasix was to be resumed if Cr resolved.    · The left ventricle is normal in size with concentric hypertrophy and normal systolic function.  · The estimated ejection fraction is 60%.  · Normal left ventricular diastolic function.  · Normal right ventricular size with normal right  ventricular systolic function.  · Mild to moderate tricuspid regurgitation.  · Normal central venous pressure (3 mmHg).  · The estimated PA systolic pressure is 36 mmHg.    Saw CELESTINA Tricia Denis for follow up for Hospital  Reports taking more frequently during the day recently with all the recent stress  Fall in hospital, got caught on cord, fell on R side, x-rays completed  Also takes effexor 75mg for anxiety/depression,  she could benefit from increased dose - so went up to 150mg  Has seen Psych in the past for therapy, not interested at this time     She has been c/o ha since admit.   Used fioricet.  She c/o right side chest pain.    She has stage IIA (pT2, pN0(sn), cM0, G2, ER+, WA-, HER2-) invasive lobular carcinoma of right breast, status post lumpectomy with sentinel lymph node biopsy in 2019.  She has since been on aromatase inhibitor and has been tolerating well.     Restaging PET-CT scan performed on 11/04/2020 revealed large hypermetabolic right subpectoral lymph node mass measuring 6.9 x 3.2 cm with SUV max of 13.0.     Right chest wall lymph node biopsy performed on 11/10/2020 revealed fragments of benign lymph node with no evidence of malignancy.  Excisional biopsy of right subpectoral lymph node was again performed on 12/02/2020 and returned as benign with no evidence of metastatic disease.     Case was discussed at the tumor conference and recommendation was to continue aromatase inhibitor with plan for repeat short interval imaging to reassess the right subpectoral lymph node mass.  This was discussed with patient however she was not satisfied with the recommendation and wanted a 2nd opinion with a different oncologist.  She  decided to refer to breast surgeon in [facility]. She had MRI and has a planned surgical resection on 3/1/21.  Status post  Lymph node excision on 03/01/2021.     pathology returned back as lymph node with follicular hyperplasia. No evidence of metastatic carcinoma or  lymphoma.     Patient had a PET scan that was ordered by her previous oncologist that was performed on 04/14/2021 that showed interim right axillary lymph node excision with large residual hypermetabolic right subpectoral lymph node mass.  Also noted interval development of subcentimeter mildly FDG avid left posterior cervical and supraclavicular lymph nodes. No FDG avid mediastinal or hilar nodes.No FDG avid pulmonary nodules/masses.      Had diagnostic bilateral mammography on 09/24/2021, results showed a left breast subcentimeter mass at the 9 o'clock position which is new when compared to mammography from 2019.  Will plan a short-interval mammogram in 6 months to re-assess left breast mass.There was no mammographic evidence of disease in the right breast.     Saw Dr. Sutton in [place] who recommended PET-CT scan. Results from PET-CT scan showed interval enlargement of subpectoral mass on the right with sustained highly FDG avidity.     Recommend continuing endocrine therapy. Advised to follow with Dr. Sutton to review PET scan   She has a knot in her right breast again, imaging next week for this.    Last Labs end of Feb Cr 1.3  Known CKD3. Improvement to baseline.     She has had some dizziness, this was in Sept, with fall.  She saw or mentioned to Dr. Arias.  She did CT head, neg for bleed.  She still complains of dizziness, with her head and headache  MRI of the brain done in 2020 with no stroke.     She has a fall in the hospital in Feb  She reports having right hand and arm pain.    She reports BP goes up.    The chest pain is on right side- she reports that it hurts when she takes a deep breath.   She had negative Troponin.  She denies retaining fluid recently.  She said when down  She does have fluid come and go.    She has been talking with Dr. Hobbs- and will see her next week.        History:  Past Medical History:   Diagnosis Date    Chronic diastolic congestive heart failure 8/25/2020     Encounter for blood transfusion     History of repair of aneurysm of abdominal aorta using endovascular stent graft     DR Bonds    Hx of psychiatric care     Hypertension     Major depressive disorder, single episode, moderate with anxious distress 2020    Malignant neoplasm of upper-outer quadrant of right breast in female, estrogen receptor positive 3/14/2019    radiation    Psychiatric problem     Sleep apnea     Sleep difficulties     Stroke 2009    no residual defect    Therapy      Past Surgical History:   Procedure Laterality Date    APPENDECTOMY      AXILLARY NODE DISSECTION Right 2020    Procedure: LYMPHADENECTOMY, AXILLARY;  Surgeon: Artemio Starks MD;  Location: [facility] OR;  Service: General;  Laterality: Right;    BIOPSY OF AXILLARY NODE Right 3/2/2021    Procedure: BIOPSY, LYMPH NODE, AXILLARY;  Surgeon: Artemio Sutton MD;  Location: [address];  Service: General;  Laterality: Right;    BREAST BIOPSY          BREAST LUMPECTOMY      2019     SECTION      x 1    OOPHORECTOMY      right     SENTINEL LYMPH NODE BIOPSY Right 3/27/2019    Procedure: BIOPSY, LYMPH NODE, SENTINEL;  Surgeon: Artemio Starks MD;  Location: [facility] OR;  Service: General;  Laterality: Right;     Family History   Problem Relation Age of Onset    Diabetes Maternal Grandmother     Diabetes Maternal Grandfather     Diabetes Mother     Sickle cell anemia Daughter     Breast cancer Neg Hx     Colon cancer Neg Hx     Ovarian cancer Neg Hx      Social History     Socioeconomic History    Marital status:      Spouse name: Campos Luther    Number of children: 2   Tobacco Use    Smoking status: Never Smoker    Smokeless tobacco: Never Used   Substance and Sexual Activity    Alcohol use: No    Drug use: No    Sexual activity: Yes     Partners: Male     Birth control/protection: Post-menopausal     Patient Active Problem List   Diagnosis    Shortness of breath on  exertion    NILS (obstructive sleep apnea)    Malignant neoplasm of upper-outer quadrant of right breast in female, estrogen receptor positive    Hypertension    Stroke    Morbid obesity with BMI of 45.0-49.9, adult    Headache    Circadian rhythm sleep disorder    Nocturnal hypoxemia    Chronic pain of breast    Major depressive disorder, single episode, moderate with anxious distress    Speech disturbance    Blurry vision    Psychological factors affecting medical condition    SOB (shortness of breath)    Chronic diastolic congestive heart failure    Lymphadenopathy    Porcelain gallbladder    Axillary mass    Splenic artery aneurysm    Chronic kidney disease, stage 3a    Other chest pain    Acute pulmonary embolism    Acute renal failure superimposed on stage 3 chronic kidney disease    Primary osteoarthritis of right hip     Review of patient's allergies indicates:   Allergen Reactions    Pcn [penicillins] Hives       The following were reviewed at this visit: active problem list, medication list, allergies, family history, social history, and health maintenance.    Medications:  Current Outpatient Medications on File Prior to Visit   Medication Sig Dispense Refill    albuterol (VENTOLIN HFA) 90 mcg/actuation inhaler Inhale 2 puffs into the lungs every 4 (four) hours as needed for Shortness of Breath. 18 g 11    ALPRAZolam (XANAX) 0.5 MG tablet Take 1 tablet (0.5 mg total) by mouth 2 (two) times daily as needed for Insomnia or Anxiety. 60 tablet 0    apixaban (ELIQUIS) 5 mg Tab take 2 tabs twice a day for 4 days then Take 1 tablet (5 mg total) by mouth 2 (two) times daily. 136 tablet 1    apixaban (ELIQUIS) 5 mg Tab Take 1 tablet (5 mg total) by mouth 2 (two) times daily. 90 tablet 3    aspirin (ECOTRIN) 81 MG EC tablet Take 81 mg by mouth once daily.      atorvastatin (LIPITOR) 40 MG tablet Take 1 tablet (40 mg total) by mouth once daily. 90 tablet 3     butalbital-acetaminophen-caffeine -40 mg (FIORICET, ESGIC) -40 mg per tablet Take 1 tablet by mouth daily as needed for Pain or Headaches. 30 tablet 0    cholecalciferol, vitamin D3, 1,250 mcg (50,000 unit) capsule Take 1 capsule (50,000 Units total) by mouth once a week. (Patient taking differently: Take 50,000 Units by mouth once a week. on Friday) 12 capsule 0    doxazosin (CARDURA) 4 MG tablet Take 1 tablet (4 mg total) by mouth every evening. 90 tablet 0    furosemide (LASIX) 20 MG tablet take 1/2 tablet by mouth once a day. 90 tablet 0    gabapentin (NEURONTIN) 600 MG tablet Take 1 tablet (600 mg total) by mouth 3 (three) times daily. (Patient taking differently: Take 600 mg by mouth 2 (two) times daily.) 270 tablet 1    hydroCHLOROthiazide (HYDRODIURIL) 50 MG tablet Take 1 tablet (50 mg total) by mouth once daily. 90 tablet 3    HYDROcodone-acetaminophen (NORCO) 7.5-325 mg per tablet Take 1 tablet by mouth every 6 (six) hours as needed for Pain. 60 tablet 0    hydrOXYzine HCL (ATARAX) 25 MG tablet Take 1 tablet (25 mg total) by mouth every evening. 60 tablet 0    letrozole (FEMARA) 2.5 mg Tab Take 1 tablet (2.5 mg total) by mouth once daily. 30 tablet 11    LIDOcaine (LIDODERM) 5 % Place 2 patches onto the skin once daily. Remove & Discard patch within 12 hours or as directed by MD 90 patch 1    multivitamin (THERAGRAN) per tablet Take 1 tablet by mouth once daily.      propranoloL (INDERAL) 40 MG tablet Take 1 tablet by mouth twice daily 60 tablet 5    telmisartan (MICARDIS) 80 MG Tab Take 1 tablet (80 mg total) by mouth once daily. 90 tablet 1    venlafaxine (EFFEXOR-XR) 150 MG Cp24 Take 1 capsule (150 mg total) by mouth once daily. 30 capsule 1    zolpidem (AMBIEN) 5 MG Tab Take 1 tablet (5 mg total) by mouth nightly as needed (sleep). 60 tablet 1     No current facility-administered medications on file prior to visit.       Medications have been reviewed and reconciled with  patient at this visit.  Barriers to medications reviewed with patient.    Adverse reactions to current medications reviewed with patient..    Over the counter medications reviewed and reconciled with patient.    Exam:  Wt Readings from Last 3 Encounters:   03/09/22 (!) 137.8 kg (303 lb 10.9 oz)   03/04/22 (!) 138.8 kg (306 lb)   02/28/22 (!) 140.9 kg (310 lb 10.1 oz)     Temp Readings from Last 3 Encounters:   03/09/22 97 °F (36.1 °C) (Tympanic)   03/04/22 97 °F (36.1 °C)   03/03/22 97.9 °F (36.6 °C)     BP Readings from Last 3 Encounters:   03/09/22 128/82   03/04/22 129/86   03/03/22 112/70     Pulse Readings from Last 3 Encounters:   03/09/22 78   03/04/22 73   03/03/22 80     Body mass index is 44.2 kg/m².      Review of Systems   Constitutional: Positive for malaise/fatigue. Negative for chills and fever.   HENT: Negative.  Negative for congestion, sinus pain and sore throat.    Eyes: Negative for blurred vision and double vision.   Respiratory: Positive for shortness of breath. Negative for cough, sputum production and wheezing.    Cardiovascular: Positive for chest pain. Negative for palpitations and leg swelling.   Gastrointestinal: Negative for abdominal pain, constipation, diarrhea, heartburn, nausea and vomiting.   Genitourinary: Negative.    Musculoskeletal: Negative.    Skin: Negative.  Negative for rash.   Neurological: Positive for weakness and headaches.   Endo/Heme/Allergies: Negative.  Negative for polydipsia. Does not bruise/bleed easily.   Psychiatric/Behavioral: Negative for depression and substance abuse.     Physical Exam  Nursing note reviewed.   Cardiovascular:      Rate and Rhythm: Normal rate and regular rhythm.   Pulmonary:      Effort: Pulmonary effort is normal. No respiratory distress.   Chest:      Chest wall: Tenderness present.          Comments: Chest wall pain  Worse with inspiration.    Reproducible    Musculoskeletal:      Right lower leg: No edema.      Left lower leg: No  edema.   Neurological:      Mental Status: She is alert and oriented to person, place, and time.   Psychiatric:         Mood and Affect: Mood normal.         Behavior: Behavior normal.         Thought Content: Thought content normal.         Judgment: Judgment normal.         Laboratory Reviewed ({Yes)  Lab Results   Component Value Date    WBC 10.67 02/28/2022    HGB 11.8 (L) 02/28/2022    HCT 37.9 02/28/2022     02/28/2022    CHOL 221 (H) 10/19/2020    TRIG 143 10/19/2020    HDL 55 10/19/2020    ALT 10 02/28/2022    AST 12 02/28/2022     02/28/2022    K 4.3 02/28/2022     02/28/2022    CREATININE 1.3 02/28/2022    BUN 18 02/28/2022    CO2 22 (L) 02/28/2022    TSH 1.460 10/18/2021    INR 0.9 02/15/2022    HGBA1C 5.9 (H) 05/18/2019       Susu was seen today for follow-up.    Diagnoses and all orders for this visit:    Fatigue, unspecified type  -     Vitamin B12; Future  -     BNP; Future  -     Troponin I; Future    Chronic diastolic congestive heart failure  -     EKG 12-lead; Future    Shortness of breath on exertion  PE  On eliquis  Unchanged from discharge  PUlse ox stable    Malignant neoplasm of upper-outer quadrant of right breast in female, estrogen receptor positive  Following oncology  CT chest ordered    Nonintractable headache, unspecified chronicity pattern, unspecified headache type  Neurology consult  CT head neg     Chronic kidney disease, stage 3a  Checking Cr today    Chronic pain of breast  Oncology    Acute pulmonary embolism, unspecified pulmonary embolism type, unspecified whether acute cor pulmonale present  Saw Oncology last week  Eliquis    Will see if EKG changes  Check Bnp and Troponin  CP chronic and reproducible  ER precautions if CP worsens    Complex patient  No stress test with PE  No CT with Contrast due to CR    Needs oncology images for chest mass.    On Eliquis    Has Cards appt next week to review  If Troponin changes today, then will send to ER.    EKG  reviewed from Admit, no change  Some artifact, no T wave or Q wave changes from original.                  Care Plan/Goals: Reviewed    Goals        Patient Stated      Blood Pressure < 150/90 (pt-stated)        Other      Exercise at least 150 minutes per week.       Take at least one BP reading per week at various times of the day           Follow up: No follow-ups on file.    After visit summary was printed and given to patient upon discharge today.  Patient goals and care plan are included in After Visit Summary.

## 2022-03-09 ENCOUNTER — OFFICE VISIT (OUTPATIENT)
Dept: INTERNAL MEDICINE | Facility: CLINIC | Age: 72
End: 2022-03-09
Payer: MEDICARE

## 2022-03-09 ENCOUNTER — LAB VISIT (OUTPATIENT)
Dept: LAB | Facility: HOSPITAL | Age: 72
End: 2022-03-09
Attending: FAMILY MEDICINE
Payer: MEDICARE

## 2022-03-09 VITALS
BODY MASS INDEX: 41.95 KG/M2 | WEIGHT: 293 LBS | TEMPERATURE: 97 F | HEIGHT: 70 IN | DIASTOLIC BLOOD PRESSURE: 82 MMHG | OXYGEN SATURATION: 97 % | SYSTOLIC BLOOD PRESSURE: 128 MMHG | HEART RATE: 78 BPM

## 2022-03-09 DIAGNOSIS — N64.4 CHRONIC PAIN OF BREAST: ICD-10-CM

## 2022-03-09 DIAGNOSIS — N18.31 CHRONIC KIDNEY DISEASE, STAGE 3A: ICD-10-CM

## 2022-03-09 DIAGNOSIS — C50.411 MALIGNANT NEOPLASM OF UPPER-OUTER QUADRANT OF RIGHT BREAST IN FEMALE, ESTROGEN RECEPTOR POSITIVE: Chronic | ICD-10-CM

## 2022-03-09 DIAGNOSIS — I50.32 CHRONIC DIASTOLIC CONGESTIVE HEART FAILURE: ICD-10-CM

## 2022-03-09 DIAGNOSIS — R06.02 SHORTNESS OF BREATH ON EXERTION: ICD-10-CM

## 2022-03-09 DIAGNOSIS — R51.9 NONINTRACTABLE HEADACHE, UNSPECIFIED CHRONICITY PATTERN, UNSPECIFIED HEADACHE TYPE: ICD-10-CM

## 2022-03-09 DIAGNOSIS — R53.83 FATIGUE, UNSPECIFIED TYPE: Primary | ICD-10-CM

## 2022-03-09 DIAGNOSIS — Z17.0 MALIGNANT NEOPLASM OF UPPER-OUTER QUADRANT OF RIGHT BREAST IN FEMALE, ESTROGEN RECEPTOR POSITIVE: Chronic | ICD-10-CM

## 2022-03-09 DIAGNOSIS — I26.99 ACUTE PULMONARY EMBOLISM, UNSPECIFIED PULMONARY EMBOLISM TYPE, UNSPECIFIED WHETHER ACUTE COR PULMONALE PRESENT: ICD-10-CM

## 2022-03-09 DIAGNOSIS — G89.29 CHRONIC PAIN OF BREAST: ICD-10-CM

## 2022-03-09 DIAGNOSIS — R53.83 FATIGUE, UNSPECIFIED TYPE: ICD-10-CM

## 2022-03-09 LAB
ALBUMIN SERPL BCP-MCNC: 3.5 G/DL (ref 3.5–5.2)
ALP SERPL-CCNC: 144 U/L (ref 55–135)
ALT SERPL W/O P-5'-P-CCNC: 10 U/L (ref 10–44)
ANION GAP SERPL CALC-SCNC: 11 MMOL/L (ref 8–16)
AST SERPL-CCNC: 13 U/L (ref 10–40)
BASOPHILS # BLD AUTO: 0.09 K/UL (ref 0–0.2)
BASOPHILS NFR BLD: 0.8 % (ref 0–1.9)
BILIRUB SERPL-MCNC: 0.2 MG/DL (ref 0.1–1)
BNP SERPL-MCNC: 28 PG/ML (ref 0–99)
BUN SERPL-MCNC: 18 MG/DL (ref 8–23)
CALCIUM SERPL-MCNC: 10.5 MG/DL (ref 8.7–10.5)
CHLORIDE SERPL-SCNC: 102 MMOL/L (ref 95–110)
CO2 SERPL-SCNC: 27 MMOL/L (ref 23–29)
CREAT SERPL-MCNC: 1.4 MG/DL (ref 0.5–1.4)
DIFFERENTIAL METHOD: ABNORMAL
EOSINOPHIL # BLD AUTO: 0.2 K/UL (ref 0–0.5)
EOSINOPHIL NFR BLD: 1.3 % (ref 0–8)
ERYTHROCYTE [DISTWIDTH] IN BLOOD BY AUTOMATED COUNT: 15.1 % (ref 11.5–14.5)
EST. GFR  (AFRICAN AMERICAN): 44 ML/MIN/1.73 M^2
EST. GFR  (NON AFRICAN AMERICAN): 38 ML/MIN/1.73 M^2
GLUCOSE SERPL-MCNC: 110 MG/DL (ref 70–110)
HCT VFR BLD AUTO: 38.5 % (ref 37–48.5)
HGB BLD-MCNC: 11.9 G/DL (ref 12–16)
IMM GRANULOCYTES # BLD AUTO: 0.04 K/UL (ref 0–0.04)
IMM GRANULOCYTES NFR BLD AUTO: 0.3 % (ref 0–0.5)
LYMPHOCYTES # BLD AUTO: 3.2 K/UL (ref 1–4.8)
LYMPHOCYTES NFR BLD: 27.2 % (ref 18–48)
MCH RBC QN AUTO: 25.2 PG (ref 27–31)
MCHC RBC AUTO-ENTMCNC: 30.9 G/DL (ref 32–36)
MCV RBC AUTO: 82 FL (ref 82–98)
MONOCYTES # BLD AUTO: 0.9 K/UL (ref 0.3–1)
MONOCYTES NFR BLD: 8.1 % (ref 4–15)
NEUTROPHILS # BLD AUTO: 7.3 K/UL (ref 1.8–7.7)
NEUTROPHILS NFR BLD: 62.3 % (ref 38–73)
NRBC BLD-RTO: 0 /100 WBC
PLATELET # BLD AUTO: 340 K/UL (ref 150–450)
PMV BLD AUTO: 9.3 FL (ref 9.2–12.9)
PROT SERPL-MCNC: 8.2 G/DL (ref 6–8.4)
RBC # BLD AUTO: 4.72 M/UL (ref 4–5.4)
SODIUM SERPL-SCNC: 140 MMOL/L (ref 136–145)
TROPONIN I SERPL DL<=0.01 NG/ML-MCNC: 0.01 NG/ML (ref 0–0.03)
VIT B12 SERPL-MCNC: 774 PG/ML (ref 210–950)
WBC # BLD AUTO: 11.66 K/UL (ref 3.9–12.7)

## 2022-03-09 PROCEDURE — 85025 COMPLETE CBC W/AUTO DIFF WBC: CPT | Performed by: INTERNAL MEDICINE

## 2022-03-09 PROCEDURE — 84484 ASSAY OF TROPONIN QUANT: CPT | Performed by: FAMILY MEDICINE

## 2022-03-09 PROCEDURE — 93005 ELECTROCARDIOGRAM TRACING: CPT | Mod: PBBFAC | Performed by: INTERNAL MEDICINE

## 2022-03-09 PROCEDURE — 93010 ELECTROCARDIOGRAM REPORT: CPT | Mod: S$GLB,,, | Performed by: INTERNAL MEDICINE

## 2022-03-09 PROCEDURE — 99499 UNLISTED E&M SERVICE: CPT | Mod: S$GLB,,, | Performed by: FAMILY MEDICINE

## 2022-03-09 PROCEDURE — 93005 ELECTROCARDIOGRAM TRACING: CPT | Mod: S$GLB,,, | Performed by: FAMILY MEDICINE

## 2022-03-09 PROCEDURE — 99999 PR PBB SHADOW E&M-EST. PATIENT-LVL V: ICD-10-PCS | Mod: PBBFAC,,, | Performed by: FAMILY MEDICINE

## 2022-03-09 PROCEDURE — 80053 COMPREHEN METABOLIC PANEL: CPT | Performed by: INTERNAL MEDICINE

## 2022-03-09 PROCEDURE — 99499 RISK ADDL DX/OHS AUDIT: ICD-10-PCS | Mod: S$GLB,,, | Performed by: FAMILY MEDICINE

## 2022-03-09 PROCEDURE — 99999 PR PBB SHADOW E&M-EST. PATIENT-LVL V: CPT | Mod: PBBFAC,,, | Performed by: FAMILY MEDICINE

## 2022-03-09 PROCEDURE — 99215 OFFICE O/P EST HI 40 MIN: CPT | Mod: S$GLB,,, | Performed by: FAMILY MEDICINE

## 2022-03-09 PROCEDURE — 82607 VITAMIN B-12: CPT | Performed by: FAMILY MEDICINE

## 2022-03-09 PROCEDURE — 99215 OFFICE O/P EST HI 40 MIN: CPT | Mod: PBBFAC | Performed by: FAMILY MEDICINE

## 2022-03-09 PROCEDURE — 93005 EKG 12-LEAD: ICD-10-PCS | Mod: S$GLB,,, | Performed by: FAMILY MEDICINE

## 2022-03-09 PROCEDURE — 93010 EKG 12-LEAD: ICD-10-PCS | Mod: S$GLB,,, | Performed by: INTERNAL MEDICINE

## 2022-03-09 PROCEDURE — 36415 COLL VENOUS BLD VENIPUNCTURE: CPT | Performed by: FAMILY MEDICINE

## 2022-03-09 PROCEDURE — 83880 ASSAY OF NATRIURETIC PEPTIDE: CPT | Performed by: FAMILY MEDICINE

## 2022-03-09 PROCEDURE — 99215 PR OFFICE/OUTPT VISIT, EST, LEVL V, 40-54 MIN: ICD-10-PCS | Mod: S$GLB,,, | Performed by: FAMILY MEDICINE

## 2022-03-11 ENCOUNTER — PATIENT MESSAGE (OUTPATIENT)
Dept: HEMATOLOGY/ONCOLOGY | Facility: CLINIC | Age: 72
End: 2022-03-11
Payer: MEDICARE

## 2022-03-14 ENCOUNTER — OFFICE VISIT (OUTPATIENT)
Dept: CARDIOLOGY | Facility: CLINIC | Age: 72
End: 2022-03-14
Payer: MEDICARE

## 2022-03-14 VITALS
HEIGHT: 69 IN | BODY MASS INDEX: 43.4 KG/M2 | DIASTOLIC BLOOD PRESSURE: 60 MMHG | WEIGHT: 293 LBS | OXYGEN SATURATION: 96 % | SYSTOLIC BLOOD PRESSURE: 110 MMHG | HEART RATE: 68 BPM

## 2022-03-14 DIAGNOSIS — M79.606 PAIN OF LOWER EXTREMITY, UNSPECIFIED LATERALITY: ICD-10-CM

## 2022-03-14 DIAGNOSIS — C50.411 MALIGNANT NEOPLASM OF UPPER-OUTER QUADRANT OF RIGHT BREAST IN FEMALE, ESTROGEN RECEPTOR POSITIVE: ICD-10-CM

## 2022-03-14 DIAGNOSIS — E78.5 HYPERLIPIDEMIA, UNSPECIFIED HYPERLIPIDEMIA TYPE: ICD-10-CM

## 2022-03-14 DIAGNOSIS — I50.32 CHRONIC DIASTOLIC CONGESTIVE HEART FAILURE: Primary | ICD-10-CM

## 2022-03-14 DIAGNOSIS — I26.99 RECURRENT PULMONARY EMBOLI: ICD-10-CM

## 2022-03-14 DIAGNOSIS — R07.89 OTHER CHEST PAIN: ICD-10-CM

## 2022-03-14 DIAGNOSIS — R06.02 SOB (SHORTNESS OF BREATH): ICD-10-CM

## 2022-03-14 DIAGNOSIS — E66.01 MORBID OBESITY WITH BMI OF 45.0-49.9, ADULT: ICD-10-CM

## 2022-03-14 DIAGNOSIS — I71.40 ABDOMINAL AORTIC ANEURYSM (AAA) WITHOUT RUPTURE: ICD-10-CM

## 2022-03-14 DIAGNOSIS — I10 HYPERTENSION, UNSPECIFIED TYPE: ICD-10-CM

## 2022-03-14 DIAGNOSIS — Z17.0 MALIGNANT NEOPLASM OF UPPER-OUTER QUADRANT OF RIGHT BREAST IN FEMALE, ESTROGEN RECEPTOR POSITIVE: ICD-10-CM

## 2022-03-14 DIAGNOSIS — Z86.73 HISTORY OF CVA (CEREBROVASCULAR ACCIDENT): ICD-10-CM

## 2022-03-14 DIAGNOSIS — I10 ESSENTIAL HYPERTENSION: ICD-10-CM

## 2022-03-14 PROCEDURE — 99214 OFFICE O/P EST MOD 30 MIN: CPT | Mod: S$GLB,,, | Performed by: STUDENT IN AN ORGANIZED HEALTH CARE EDUCATION/TRAINING PROGRAM

## 2022-03-14 PROCEDURE — 99215 OFFICE O/P EST HI 40 MIN: CPT | Mod: PBBFAC | Performed by: STUDENT IN AN ORGANIZED HEALTH CARE EDUCATION/TRAINING PROGRAM

## 2022-03-14 PROCEDURE — 99999 PR PBB SHADOW E&M-EST. PATIENT-LVL V: ICD-10-PCS | Mod: PBBFAC,,, | Performed by: STUDENT IN AN ORGANIZED HEALTH CARE EDUCATION/TRAINING PROGRAM

## 2022-03-14 PROCEDURE — 99999 PR PBB SHADOW E&M-EST. PATIENT-LVL V: CPT | Mod: PBBFAC,,, | Performed by: STUDENT IN AN ORGANIZED HEALTH CARE EDUCATION/TRAINING PROGRAM

## 2022-03-14 PROCEDURE — 99214 PR OFFICE/OUTPT VISIT, EST, LEVL IV, 30-39 MIN: ICD-10-PCS | Mod: S$GLB,,, | Performed by: STUDENT IN AN ORGANIZED HEALTH CARE EDUCATION/TRAINING PROGRAM

## 2022-03-14 NOTE — PROGRESS NOTES
Subjective:   Patient ID:  Susu Luther is a 72 y.o. female who presents for follow up of No chief complaint on file.    12/1/20  71 yo female, care establish. Prior cardiologist Dr ackerman   [facility] HTN, CVA (2009), Right breast CA, Lumpectomy 3/2019, h/o PE off OAC 2 yrs ago.  AAA, s/p transcutaneous patch by Dr. Bonds, obesity knee OA imbalanced walker dependent  C/o SOB after walking few steps and dizziness. Had vision issue 1 month ago due to uncontrolled HTN  No chest pain  Sleeps with 2 pillows  Decent appetites  Leg calf pain worse at night  No smoking/drinking  ekg today NSR LVH.    ECH normal EF, grade II DD, LAE and PAP 56 mmHG   Chest CTA negative for PE  BP high    A1c controlled    S/p Open subpectoral lymph node biopsy on the right on 12/02/2020 by Dr. Starks  Still right chest, under arm and shoudler supersensitive pain      Congestive Heart Failure  Associated symptoms include shortness of breath. Pertinent negatives include no abdominal pain, chest pain, claudication, near-syncope or palpitations.     2/28/22  Patient was admitted to [facility] for shortness of breath.  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  Started on heparin drip in transition to oral anticoagulation, now on Eliquis.  Recurrent PE.  On Femara. Has another lump in breast on right side, recently seen on PET scan.   Due to TANNER, was told to stop hctz, telmisartan.  She has ran out of clonidine. Does not take the ASA, hydralazine, amlodipine.   Blood pressure normotensive.  Reports severe headaches.  Has been out of Fioricet.  Echocardiogram with normal EF  Reports shortness of breath, chest heaviness mostly right-sided.      3/14/22  Still having SOB due to PE.  No bleeding issues while on eliquis.  Chest pain is substernal to right sided.   Lasix doesn't seem to help.   A reports intermittent leg pain right > left.  DVT ultrasound in-hospital with no  evidence of DVT.  Following Hematology-Oncology.  Patient does not want surgery if needed for possible breast cancer.  Denies syncope, fever, chills.         Echo 2/28/22  · The left ventricle is normal in size with concentric hypertrophy and normal systolic function.  · The estimated ejection fraction is 60%.  · Normal left ventricular diastolic function.  · Normal right ventricular size with normal right ventricular systolic function.  · Mild to moderate tricuspid regurgitation.  · Normal central venous pressure (3 mmHg).  · The estimated PA systolic pressure is 36 mmHg.       Echo 2019  CONCLUSIONS     1 - Mild left atrial enlargement.     2 - Concentric hypertrophy.     3 - No wall motion abnormalities.     4 - Normal left ventricular systolic function (EF 60-65%).     5 - Impaired LV relaxation, elevated LAP (grade 2 diastolic dysfunction).     6 - Normal right ventricular systolic function .     7 - The estimated PA systolic pressure is 36 mmHg.     8 - Mild tricuspid regurgitation.        Past Medical History:   Diagnosis Date    Chronic diastolic congestive heart failure 8/25/2020    Encounter for blood transfusion     History of repair of aneurysm of abdominal aorta using endovascular stent graft     DR Bonds    Hx of psychiatric care     Hypertension     Major depressive disorder, single episode, moderate with anxious distress 1/14/2020    Malignant neoplasm of upper-outer quadrant of right breast in female, estrogen receptor positive 3/14/2019    radiation    Psychiatric problem     Sleep apnea     Sleep difficulties     Stroke 2009    no residual defect    Therapy        Past Surgical History:   Procedure Laterality Date    APPENDECTOMY      AXILLARY NODE DISSECTION Right 12/2/2020    Procedure: LYMPHADENECTOMY, AXILLARY;  Surgeon: Artemio Starks MD;  Location: [facility];  Service: General;  Laterality: Right;    BIOPSY OF AXILLARY NODE Right 3/2/2021    Procedure: BIOPSY, LYMPH NODE,  AXILLARY;  Surgeon: Artemio Sutton MD;  Location: [address];  Service: General;  Laterality: Right;    BREAST BIOPSY      2019    BREAST LUMPECTOMY      2019     SECTION      x 1    OOPHORECTOMY      right     SENTINEL LYMPH NODE BIOPSY Right 3/27/2019    Procedure: BIOPSY, LYMPH NODE, SENTINEL;  Surgeon: Artemio Starks MD;  Location: [facility];  Service: General;  Laterality: Right;       Social History     Tobacco Use    Smoking status: Never Smoker    Smokeless tobacco: Never Used   Substance Use Topics    Alcohol use: No    Drug use: No       Family History   Problem Relation Age of Onset    Diabetes Maternal Grandmother     Diabetes Maternal Grandfather     Diabetes Mother     Sickle cell anemia Daughter     Breast cancer Neg Hx     Colon cancer Neg Hx     Ovarian cancer Neg Hx          Review of Systems   Constitutional: Negative for decreased appetite, diaphoresis, fever, malaise/fatigue and night sweats.   HENT: Negative for nosebleeds.    Eyes: Negative for blurred vision and double vision.   Cardiovascular: Positive for dyspnea on exertion. Negative for chest pain, claudication, irregular heartbeat, leg swelling, near-syncope, orthopnea, palpitations, paroxysmal nocturnal dyspnea and syncope.   Respiratory: Positive for shortness of breath. Negative for cough, sleep disturbances due to breathing, snoring, sputum production and wheezing.    Endocrine: Negative for cold intolerance and polyuria.   Hematologic/Lymphatic: Does not bruise/bleed easily.   Skin: Negative for rash.   Musculoskeletal: Negative for back pain, falls, joint pain, joint swelling and neck pain.        Right chest breast and shoulder pain   Gastrointestinal: Negative for abdominal pain, heartburn, nausea and vomiting.   Genitourinary: Negative for dysuria, frequency and hematuria.   Neurological: Negative for difficulty with concentration, dizziness, focal weakness, headaches, light-headedness,  "numbness, seizures and weakness.   Psychiatric/Behavioral: Negative for depression, memory loss and substance abuse. The patient does not have insomnia.    Allergic/Immunologic: Negative for HIV exposure and hives.     Vitals:    03/14/22 1349   BP: 110/60   Pulse: 68   SpO2: 96%   Weight: (!) 140.7 kg (310 lb 3 oz)   Height: 5' 9" (1.753 m)         Objective:   Physical Exam  HENT:      Head: Normocephalic.   Eyes:      Pupils: Pupils are equal, round, and reactive to light.   Neck:      Thyroid: No thyromegaly.      Vascular: Normal carotid pulses. No carotid bruit or JVD.   Cardiovascular:      Rate and Rhythm: Normal rate and regular rhythm.  No extrasystoles are present.     Chest Wall: PMI is not displaced.      Pulses: Normal pulses.      Heart sounds: Normal heart sounds. No murmur heard.    No gallop. No S3 sounds.   Pulmonary:      Effort: No respiratory distress.      Breath sounds: Normal breath sounds. No stridor.   Abdominal:      General: Bowel sounds are normal.      Palpations: Abdomen is soft.      Tenderness: There is no abdominal tenderness. There is no rebound.   Musculoskeletal:         General: Normal range of motion.   Skin:     Findings: No rash.   Neurological:      Mental Status: She is alert and oriented to person, place, and time.   Psychiatric:         Behavior: Behavior normal.         Lab Results   Component Value Date    CHOL 221 (H) 10/19/2020    CHOL 231 (H) 08/25/2020    CHOL 229 (H) 05/18/2019     Lab Results   Component Value Date    HDL 55 10/19/2020    HDL 49 08/25/2020    HDL 43 05/18/2019     Lab Results   Component Value Date    LDLCALC 137.4 10/19/2020    LDLCALC 135.6 08/25/2020    LDLCALC 148.0 05/18/2019     Lab Results   Component Value Date    TRIG 143 10/19/2020    TRIG 232 (H) 08/25/2020    TRIG 190 (H) 05/18/2019     Lab Results   Component Value Date    CHOLHDL 24.9 10/19/2020    CHOLHDL 21.2 08/25/2020    CHOLHDL 18.8 (L) 05/18/2019       Chemistry      "   Component Value Date/Time     03/09/2022 1619    K 4.3 02/28/2022 1434     03/09/2022 1619    CO2 27 03/09/2022 1619    BUN 18 03/09/2022 1619    CREATININE 1.4 03/09/2022 1619     03/09/2022 1619        Component Value Date/Time    CALCIUM 10.5 03/09/2022 1619    ALKPHOS 144 (H) 03/09/2022 1619    AST 13 03/09/2022 1619    ALT 10 03/09/2022 1619    BILITOT 0.2 03/09/2022 1619    ESTGFRAFRICA 44 (A) 03/09/2022 1619    EGFRNONAA 38 (A) 03/09/2022 1619          Lab Results   Component Value Date    HGBA1C 5.9 (H) 05/18/2019     Lab Results   Component Value Date    TSH 1.460 10/18/2021     Lab Results   Component Value Date    INR 0.9 02/15/2022    INR 1.0 11/10/2020    INR 1.0 05/19/2019     Lab Results   Component Value Date    WBC 11.66 03/09/2022    HGB 11.9 (L) 03/09/2022    HCT 38.5 03/09/2022    MCV 82 03/09/2022     03/09/2022     BMP  Sodium   Date Value Ref Range Status   03/09/2022 140 136 - 145 mmol/L Final     Potassium   Date Value Ref Range Status   02/28/2022 4.3 3.5 - 5.1 mmol/L Final     Chloride   Date Value Ref Range Status   03/09/2022 102 95 - 110 mmol/L Final     CO2   Date Value Ref Range Status   03/09/2022 27 23 - 29 mmol/L Final     BUN   Date Value Ref Range Status   03/09/2022 18 8 - 23 mg/dL Final     Creatinine   Date Value Ref Range Status   03/09/2022 1.4 0.5 - 1.4 mg/dL Final     Calcium   Date Value Ref Range Status   03/09/2022 10.5 8.7 - 10.5 mg/dL Final     Anion Gap   Date Value Ref Range Status   03/09/2022 11 8 - 16 mmol/L Final     eGFR if    Date Value Ref Range Status   03/09/2022 44 (A) >60 mL/min/1.73 m^2 Final     eGFR if non    Date Value Ref Range Status   03/09/2022 38 (A) >60 mL/min/1.73 m^2 Final     Comment:     Calculation used to obtain the estimated glomerular filtration  rate (eGFR) is the CKD-EPI equation.        BNP  @LABRCNTIP(BNP,BNPTRIAGEBLO)@  @LABRCNTIP(troponini)@  Estimated Creatinine  Clearance: 56.7 mL/min (based on SCr of 1.4 mg/dL).  No results found in the last 24 hours.  No results found in the last 24 hours.  No results found in the last 24 hours.    Assessment:      1. Chronic diastolic congestive heart failure    2. Essential hypertension    3. SOB (shortness of breath)    4. Morbid obesity with BMI of 45.0-49.9, adult    5. Abdominal aortic aneurysm (AAA) without rupture    6. Malignant neoplasm of upper-outer quadrant of right breast in female, estrogen receptor positive    7. Other chest pain    8. History of CVA (cerebrovascular accident)    9. Hyperlipidemia, unspecified hyperlipidemia type    10. Recurrent pulmonary emboli    11. Hypertension, unspecified type    12. Pain of lower extremity, unspecified laterality          Plan:     Right leg pain  DVT ultrasound recently without evidence of DVT   Will obtain ABIs    Recurrent pulmonary embolus  Reports shortness of breath, chest pain mostly right-sided  Continue OAC    Hypertension  Controlled  Continue holding antihypertensives    Diastolic heart failure  Reports shortness of breath, most likely due to PE  Continue Lasix    History of CVA  Denies symptoms  Currently not on aspirin as she is taking Eliquis  Continue statin    HLD   as of 2020  Continue statin  Lipid panel in 1 month     History of right breast cancer  Now has possible recurrent breast cancer  Follow-up with Hematology-Oncology    AAA s/p transcutaneous patch  Stable    Morbid obesity, BMI > 40  Low-salt, low-fat diet  Exercise as tolerated      All labs and cardiac procedures reviewed.    Return to clinic in 1 month    Arlene Hobbs MD  Cardiology Staff

## 2022-03-17 ENCOUNTER — HOSPITAL ENCOUNTER (OUTPATIENT)
Dept: RADIOLOGY | Facility: HOSPITAL | Age: 72
Discharge: HOME | End: 2022-03-17
Attending: SURGERY
Payer: MEDICARE

## 2022-03-17 ENCOUNTER — HOSPITAL ENCOUNTER (OUTPATIENT)
Dept: RADIOLOGY | Facility: HOSPITAL | Age: 72
Discharge: HOME | End: 2022-03-17
Attending: INTERNAL MEDICINE
Payer: MEDICARE

## 2022-03-17 ENCOUNTER — HOSPITAL ENCOUNTER (OUTPATIENT)
Dept: RADIOLOGY | Facility: HOSPITAL | Age: 72
Discharge: HOME | End: 2022-03-17
Attending: PHYSICIAN ASSISTANT
Payer: MEDICARE

## 2022-03-17 DIAGNOSIS — Z17.0 MALIGNANT NEOPLASM OF UPPER-OUTER QUADRANT OF RIGHT BREAST IN FEMALE, ESTROGEN RECEPTOR POSITIVE: Chronic | ICD-10-CM

## 2022-03-17 DIAGNOSIS — C50.411 MALIGNANT NEOPLASM OF UPPER-OUTER QUADRANT OF RIGHT BREAST IN FEMALE, ESTROGEN RECEPTOR POSITIVE: Chronic | ICD-10-CM

## 2022-03-17 DIAGNOSIS — Z17.0 MALIGNANT NEOPLASM OF UPPER-OUTER QUADRANT OF RIGHT BREAST IN FEMALE, ESTROGEN RECEPTOR POSITIVE: ICD-10-CM

## 2022-03-17 DIAGNOSIS — R22.31 AXILLARY MASS, RIGHT: ICD-10-CM

## 2022-03-17 DIAGNOSIS — C50.411 MALIGNANT NEOPLASM OF UPPER-OUTER QUADRANT OF RIGHT BREAST IN FEMALE, ESTROGEN RECEPTOR POSITIVE: ICD-10-CM

## 2022-03-17 DIAGNOSIS — R60.9 SOFT TISSUE SWELLING OF BACK: ICD-10-CM

## 2022-03-17 PROCEDURE — 76642 ULTRASOUND BREAST LIMITED: CPT | Mod: 26,LT,, | Performed by: RADIOLOGY

## 2022-03-17 PROCEDURE — 76604 US SOFT TISSUE CHEST_UPPER BACK: ICD-10-PCS | Mod: 26,,, | Performed by: RADIOLOGY

## 2022-03-17 PROCEDURE — 76604 US EXAM CHEST: CPT | Mod: TC

## 2022-03-17 PROCEDURE — 77065 DX MAMMO INCL CAD UNI: CPT | Mod: TC,LT

## 2022-03-17 PROCEDURE — 77065 MAMMO DIGITAL DIAGNOSTIC LEFT WITH TOMO: ICD-10-PCS | Mod: 26,LT,, | Performed by: RADIOLOGY

## 2022-03-17 PROCEDURE — 76642 US BREAST LEFT LIMITED: ICD-10-PCS | Mod: 26,LT,, | Performed by: RADIOLOGY

## 2022-03-17 PROCEDURE — 77061 BREAST TOMOSYNTHESIS UNI: CPT | Mod: 26,LT,, | Performed by: RADIOLOGY

## 2022-03-17 PROCEDURE — 70491 CT SOFT TISSUE NECK W/DYE: CPT | Mod: 26,,, | Performed by: RADIOLOGY

## 2022-03-17 PROCEDURE — 71260 CT THORAX DX C+: CPT | Mod: TC

## 2022-03-17 PROCEDURE — 76642 ULTRASOUND BREAST LIMITED: CPT | Mod: TC,LT

## 2022-03-17 PROCEDURE — 70491 CT NECK CHEST WITH CONTRAST (XPD): ICD-10-PCS | Mod: 26,,, | Performed by: RADIOLOGY

## 2022-03-17 PROCEDURE — 76604 US EXAM CHEST: CPT | Mod: 26,,, | Performed by: RADIOLOGY

## 2022-03-17 PROCEDURE — 77061 MAMMO DIGITAL DIAGNOSTIC LEFT WITH TOMO: ICD-10-PCS | Mod: 26,LT,, | Performed by: RADIOLOGY

## 2022-03-17 PROCEDURE — 77065 DX MAMMO INCL CAD UNI: CPT | Mod: 26,LT,, | Performed by: RADIOLOGY

## 2022-03-17 PROCEDURE — 71260 CT THORAX DX C+: CPT | Mod: 26,,, | Performed by: RADIOLOGY

## 2022-03-17 PROCEDURE — 25500020 PHARM REV CODE 255: Performed by: SURGERY

## 2022-03-17 PROCEDURE — 71260 CT NECK CHEST WITH CONTRAST (XPD): ICD-10-PCS | Mod: 26,,, | Performed by: RADIOLOGY

## 2022-03-17 RX ADMIN — IOHEXOL 125 ML: 350 INJECTION, SOLUTION INTRAVENOUS at 08:03

## 2022-03-22 DIAGNOSIS — C50.411 MALIGNANT NEOPLASM OF UPPER-OUTER QUADRANT OF RIGHT BREAST IN FEMALE, ESTROGEN RECEPTOR POSITIVE: ICD-10-CM

## 2022-03-22 DIAGNOSIS — Z17.0 MALIGNANT NEOPLASM OF UPPER-OUTER QUADRANT OF RIGHT BREAST IN FEMALE, ESTROGEN RECEPTOR POSITIVE: ICD-10-CM

## 2022-03-22 DIAGNOSIS — R22.31 AXILLARY MASS, RIGHT: Primary | ICD-10-CM

## 2022-03-23 ENCOUNTER — PATIENT MESSAGE (OUTPATIENT)
Dept: CARDIOLOGY | Facility: CLINIC | Age: 72
End: 2022-03-23
Payer: MEDICARE

## 2022-03-23 ENCOUNTER — HOSPITAL ENCOUNTER (OUTPATIENT)
Dept: CARDIOLOGY | Facility: HOSPITAL | Age: 72
Discharge: HOME | End: 2022-03-23
Attending: STUDENT IN AN ORGANIZED HEALTH CARE EDUCATION/TRAINING PROGRAM
Payer: MEDICARE

## 2022-03-23 VITALS
BODY MASS INDEX: 43.4 KG/M2 | DIASTOLIC BLOOD PRESSURE: 74 MMHG | SYSTOLIC BLOOD PRESSURE: 133 MMHG | HEIGHT: 69 IN | WEIGHT: 293 LBS

## 2022-03-23 DIAGNOSIS — M79.606 PAIN OF LOWER EXTREMITY, UNSPECIFIED LATERALITY: ICD-10-CM

## 2022-03-23 LAB
LEFT ABI: 1.23
LEFT ARM BP: 133 MMHG
LEFT DORSALIS PEDIS: 163 MMHG
LEFT POSTERIOR TIBIAL: 155 MMHG
LEFT TBI: 1.11
LEFT TOE PRESSURE: 148 MMHG
RIGHT ABI: 1.2
RIGHT DORSALIS PEDIS: 160 MMHG
RIGHT POSTERIOR TIBIAL: 158 MMHG
RIGHT TBI: 1.13
RIGHT TOE PRESSURE: 150 MMHG

## 2022-03-23 PROCEDURE — 93922 ANKLE BRACHIAL INDICES (ABI): ICD-10-PCS | Mod: 26,,, | Performed by: INTERNAL MEDICINE

## 2022-03-23 PROCEDURE — 93922 UPR/L XTREMITY ART 2 LEVELS: CPT

## 2022-03-23 PROCEDURE — 93922 UPR/L XTREMITY ART 2 LEVELS: CPT | Mod: 26,,, | Performed by: INTERNAL MEDICINE

## 2022-03-28 ENCOUNTER — PATIENT MESSAGE (OUTPATIENT)
Dept: HEMATOLOGY/ONCOLOGY | Facility: CLINIC | Age: 72
End: 2022-03-28
Payer: MEDICARE

## 2022-03-28 DIAGNOSIS — I26.99 ACUTE PULMONARY EMBOLISM, UNSPECIFIED PULMONARY EMBOLISM TYPE, UNSPECIFIED WHETHER ACUTE COR PULMONALE PRESENT: ICD-10-CM

## 2022-03-29 ENCOUNTER — PATIENT MESSAGE (OUTPATIENT)
Dept: INTERNAL MEDICINE | Facility: CLINIC | Age: 72
End: 2022-03-29
Payer: MEDICARE

## 2022-03-30 ENCOUNTER — OFFICE VISIT (OUTPATIENT)
Dept: INTERNAL MEDICINE | Facility: CLINIC | Age: 72
End: 2022-03-30
Payer: MEDICARE

## 2022-03-30 ENCOUNTER — PATIENT MESSAGE (OUTPATIENT)
Dept: INTERNAL MEDICINE | Facility: CLINIC | Age: 72
End: 2022-03-30

## 2022-03-30 VITALS
HEART RATE: 67 BPM | OXYGEN SATURATION: 98 % | BODY MASS INDEX: 43.4 KG/M2 | SYSTOLIC BLOOD PRESSURE: 120 MMHG | HEIGHT: 69 IN | DIASTOLIC BLOOD PRESSURE: 60 MMHG | TEMPERATURE: 97 F | WEIGHT: 293 LBS

## 2022-03-30 DIAGNOSIS — M79.604 BILATERAL LEG PAIN: Primary | ICD-10-CM

## 2022-03-30 DIAGNOSIS — O22.30 DVT (DEEP VEIN THROMBOSIS) IN PREGNANCY: ICD-10-CM

## 2022-03-30 DIAGNOSIS — M79.605 BILATERAL LEG PAIN: Primary | ICD-10-CM

## 2022-03-30 DIAGNOSIS — M79.601 RIGHT ARM PAIN: ICD-10-CM

## 2022-03-30 PROCEDURE — 99999 PR PBB SHADOW E&M-EST. PATIENT-LVL V: CPT | Mod: PBBFAC,,, | Performed by: FAMILY MEDICINE

## 2022-03-30 PROCEDURE — 99999 PR PBB SHADOW E&M-EST. PATIENT-LVL V: ICD-10-PCS | Mod: PBBFAC,,, | Performed by: FAMILY MEDICINE

## 2022-03-30 PROCEDURE — 99215 OFFICE O/P EST HI 40 MIN: CPT | Mod: S$GLB,,, | Performed by: FAMILY MEDICINE

## 2022-03-30 PROCEDURE — 99215 PR OFFICE/OUTPT VISIT, EST, LEVL V, 40-54 MIN: ICD-10-PCS | Mod: S$GLB,,, | Performed by: FAMILY MEDICINE

## 2022-03-30 PROCEDURE — 99215 OFFICE O/P EST HI 40 MIN: CPT | Mod: PBBFAC | Performed by: FAMILY MEDICINE

## 2022-03-30 RX ORDER — BUTALBITAL, ACETAMINOPHEN AND CAFFEINE 50; 325; 40 MG/1; MG/1; MG/1
1 TABLET ORAL DAILY PRN
Qty: 30 TABLET | Refills: 0 | Status: SHIPPED | OUTPATIENT
Start: 2022-03-30 | End: 2022-04-18 | Stop reason: SDUPTHER

## 2022-03-30 RX ORDER — DICLOFENAC SODIUM 10 MG/G
2 GEL TOPICAL DAILY
Qty: 450 G | Refills: 1 | Status: SHIPPED | OUTPATIENT
Start: 2022-03-30 | End: 2022-04-06 | Stop reason: SDUPTHER

## 2022-03-30 RX ORDER — HYDRALAZINE HYDROCHLORIDE 100 MG/1
100 TABLET, FILM COATED ORAL EVERY 8 HOURS
COMMUNITY
Start: 2022-03-18 | End: 2022-07-11

## 2022-03-30 NOTE — PROGRESS NOTES
Subjective:       Patient ID: Susu Luther is a 72 y.o. female.    Chief Complaint: Shortness of Breath, Arm Pain, and Headache    HPI  Ms. Luther presents today with arm pain, headache and shortness of breath.     Right arm pain   10/10  Swelling  Didn't get out of bed yesterday    Legs hurting also    Had a vascular procedure Thursday of last week for clots.   Previously she was given gel for pain   Did not get that this discharge    Legs still numb  Has to  leg to right   Feels the pain is a squeezing pain    Blood clot was in right leg and lung    Fell when at the hospital  Hurt right hip    Dx of Heart failure     Right arm pain   Feels like pens     Can not get rid of headache, would like refill on fiorcet    Review of Systems   Constitutional: Negative for activity change, appetite change, fatigue and fever.   HENT: Negative for congestion, ear pain and sinus pressure.    Eyes: Negative for pain and visual disturbance.   Respiratory: Positive for shortness of breath. Negative for cough, chest tightness and wheezing.    Cardiovascular: Negative for chest pain, palpitations and leg swelling.   Gastrointestinal: Negative for abdominal distention, abdominal pain, constipation, diarrhea, nausea and vomiting.   Endocrine: Negative for polydipsia and polyuria.   Genitourinary: Negative for difficulty urinating, dyspareunia, dysuria, flank pain and hematuria.   Musculoskeletal: Positive for arthralgias, gait problem and myalgias. Negative for back pain.   Skin: Negative for rash.   Neurological: Negative for dizziness, tremors, syncope, weakness, numbness and headaches.   Psychiatric/Behavioral: Negative for agitation and confusion.           Past Medical History:   Diagnosis Date    Chronic diastolic congestive heart failure 8/25/2020    Encounter for blood transfusion     History of repair of aneurysm of abdominal aorta using endovascular stent graft     DR Bnods     of psychiatric care      Hypertension     Major depressive disorder, single episode, moderate with anxious distress 2020    Malignant neoplasm of upper-outer quadrant of right breast in female, estrogen receptor positive 3/14/2019    radiation    Psychiatric problem     Sleep apnea     Sleep difficulties     Stroke 2009    no residual defect    Therapy      Past Surgical History:   Procedure Laterality Date    APPENDECTOMY      AXILLARY NODE DISSECTION Right 2020    Procedure: LYMPHADENECTOMY, AXILLARY;  Surgeon: Artemio Starks MD;  Location: [facility];  Service: General;  Laterality: Right;    BIOPSY OF AXILLARY NODE Right 3/2/2021    Procedure: BIOPSY, LYMPH NODE, AXILLARY;  Surgeon: Artemio Sutton MD;  Location: [address];  Service: General;  Laterality: Right;    BREAST BIOPSY      2019    BREAST LUMPECTOMY      2019     SECTION      x 1    OOPHORECTOMY      right     SENTINEL LYMPH NODE BIOPSY Right 3/27/2019    Procedure: BIOPSY, LYMPH NODE, SENTINEL;  Surgeon: Artemio Starks MD;  Location: [facility];  Service: General;  Laterality: Right;     Family History   Problem Relation Age of Onset    Diabetes Maternal Grandmother     Diabetes Maternal Grandfather     Diabetes Mother     Sickle cell anemia Daughter     Breast cancer Neg Hx     Colon cancer Neg Hx     Ovarian cancer Neg Hx      Social History     Socioeconomic History    Marital status:      Spouse name: Campos Luther    Number of children: 2   Tobacco Use    Smoking status: Never Smoker    Smokeless tobacco: Never Used   Substance and Sexual Activity    Alcohol use: No    Drug use: No    Sexual activity: Yes     Partners: Male     Birth control/protection: Post-menopausal       Objective:        Physical Exam  Vitals reviewed.   Constitutional:       Appearance: Normal appearance. She is obese.   HENT:      Head: Normocephalic and atraumatic.   Eyes:      Extraocular Movements: Extraocular movements intact.    Pulmonary:      Effort: Pulmonary effort is normal.   Musculoskeletal:         General: Swelling present.      Cervical back: Normal range of motion.   Skin:     General: Skin is warm.   Neurological:      Mental Status: She is alert and oriented to person, place, and time.   Psychiatric:         Mood and Affect: Mood normal.      Comments: tearful           Results for orders placed or performed during the hospital encounter of 03/23/22   Ankle Brachial Indices (RAFI)   Result Value Ref Range    Left arm  mmHg    Left posterior tibial 155 mmHg    Right posterior tibial 158 mmHg    Left dorsalis pedis 163 mmHg    Right dorsalis pedis 160 mmHg    Left RAFI 1.23     Right RAFI 1.20     Left toe pressure 148 mmHg    Right toe pressure 150 mmHg    Left TBI 1.11     Right TBI 1.13      *Note: Due to a large number of results and/or encounters for the requested time period, some results have not been displayed. A complete set of results can be found in Results Review.       Assessment/Plan:     Bilateral leg pain  -     diclofenac sodium (VOLTAREN) 1 % Gel; Apply 2 grams topically once daily.  Dispense: 450 g; Refill: 1  -     Ambulatory referral/consult to Vascular Medicine; Future; Expected date: 04/06/2022  -     oxyCODONE-acetaminophen (PERCOCET) 7.5-325 mg per tablet; Take 1 tablet by mouth every 8 (eight) hours as needed for Pain.  Dispense: 60 tablet; Refill: 0    DVT (deep vein thrombosis) in pregnancy  -     Ambulatory referral/consult to Vascular Medicine; Future; Expected date: 04/06/2022    Right arm pain  -     oxyCODONE-acetaminophen (PERCOCET) 7.5-325 mg per tablet; Take 1 tablet by mouth every 8 (eight) hours as needed for Pain.  Dispense: 60 tablet; Refill: 0    Other orders  -     butalbital-acetaminophen-caffeine -40 mg (FIORICET, ESGIC) -40 mg per tablet; Take 1 tablet by mouth daily as needed for Pain or Headaches.  Dispense: 30 tablet; Refill: 0      Get follow up with  Vascular    Follow up as needed    Peace Arias MD  [facility]

## 2022-03-31 RX ORDER — OXYCODONE AND ACETAMINOPHEN 7.5; 325 MG/1; MG/1
1 TABLET ORAL EVERY 8 HOURS PRN
Qty: 60 TABLET | Refills: 0 | Status: SHIPPED | OUTPATIENT
Start: 2022-03-31 | End: 2022-04-27 | Stop reason: SDUPTHER

## 2022-04-04 ENCOUNTER — PATIENT MESSAGE (OUTPATIENT)
Dept: HEMATOLOGY/ONCOLOGY | Facility: CLINIC | Age: 72
End: 2022-04-04
Payer: MEDICARE

## 2022-04-04 DIAGNOSIS — I26.99 ACUTE PULMONARY EMBOLISM, UNSPECIFIED PULMONARY EMBOLISM TYPE, UNSPECIFIED WHETHER ACUTE COR PULMONALE PRESENT: ICD-10-CM

## 2022-04-06 DIAGNOSIS — M79.605 BILATERAL LEG PAIN: ICD-10-CM

## 2022-04-06 DIAGNOSIS — M79.604 BILATERAL LEG PAIN: ICD-10-CM

## 2022-04-06 DIAGNOSIS — I10 ESSENTIAL HYPERTENSION: ICD-10-CM

## 2022-04-06 DIAGNOSIS — F32.1 MAJOR DEPRESSIVE DISORDER, SINGLE EPISODE, MODERATE WITH ANXIOUS DISTRESS: ICD-10-CM

## 2022-04-06 DIAGNOSIS — Z17.0 MALIGNANT NEOPLASM OF UPPER-OUTER QUADRANT OF RIGHT BREAST IN FEMALE, ESTROGEN RECEPTOR POSITIVE: Chronic | ICD-10-CM

## 2022-04-06 DIAGNOSIS — C50.411 MALIGNANT NEOPLASM OF UPPER-OUTER QUADRANT OF RIGHT BREAST IN FEMALE, ESTROGEN RECEPTOR POSITIVE: Chronic | ICD-10-CM

## 2022-04-06 DIAGNOSIS — F41.9 ANXIETY: ICD-10-CM

## 2022-04-06 DIAGNOSIS — I10 UNCONTROLLED HYPERTENSION: ICD-10-CM

## 2022-04-06 DIAGNOSIS — Z86.73 HISTORY OF CVA (CEREBROVASCULAR ACCIDENT): ICD-10-CM

## 2022-04-06 RX ORDER — LETROZOLE 2.5 MG/1
2.5 TABLET, FILM COATED ORAL DAILY
Qty: 90 TABLET | Refills: 0 | Status: SHIPPED | OUTPATIENT
Start: 2022-04-06 | End: 2022-04-27

## 2022-04-06 RX ORDER — FUROSEMIDE 20 MG/1
TABLET ORAL
Qty: 90 TABLET | Refills: 0 | Status: SHIPPED | OUTPATIENT
Start: 2022-04-06 | End: 2022-07-11 | Stop reason: SDUPTHER

## 2022-04-06 RX ORDER — DICLOFENAC SODIUM 10 MG/G
2 GEL TOPICAL DAILY
Qty: 450 G | Refills: 1 | Status: SHIPPED | OUTPATIENT
Start: 2022-04-06 | End: 2022-12-21 | Stop reason: SDUPTHER

## 2022-04-06 RX ORDER — VENLAFAXINE HYDROCHLORIDE 150 MG/1
150 CAPSULE, EXTENDED RELEASE ORAL DAILY
Qty: 90 CAPSULE | Refills: 0 | Status: ON HOLD | OUTPATIENT
Start: 2022-04-06 | End: 2022-06-14

## 2022-04-06 RX ORDER — ZOLPIDEM TARTRATE 5 MG/1
5 TABLET ORAL NIGHTLY PRN
Qty: 90 TABLET | Refills: 1 | Status: SHIPPED | OUTPATIENT
Start: 2022-04-06 | End: 2022-07-11 | Stop reason: SDUPTHER

## 2022-04-06 RX ORDER — AMLODIPINE BESYLATE 10 MG/1
10 TABLET ORAL DAILY
Qty: 90 TABLET | Refills: 2 | Status: SHIPPED | OUTPATIENT
Start: 2022-04-06 | End: 2022-06-17 | Stop reason: SDUPTHER

## 2022-04-06 RX ORDER — CARVEDILOL 12.5 MG/1
12.5 TABLET ORAL DAILY
Qty: 90 TABLET | Refills: 1 | Status: SHIPPED | OUTPATIENT
Start: 2022-04-06 | End: 2022-04-18

## 2022-04-06 RX ORDER — GABAPENTIN 600 MG/1
600 TABLET ORAL 3 TIMES DAILY
Qty: 270 TABLET | Refills: 0 | Status: SHIPPED | OUTPATIENT
Start: 2022-04-06 | End: 2022-06-06

## 2022-04-06 NOTE — TELEPHONE ENCOUNTER
No new care gaps identified.  Powered by [employer]. Reference number: [id number].   4/06/2022 9:36:10 AM CDT

## 2022-04-07 DIAGNOSIS — I10 UNCONTROLLED HYPERTENSION: ICD-10-CM

## 2022-04-07 RX ORDER — HYDROCHLOROTHIAZIDE 50 MG/1
50 TABLET ORAL DAILY
Qty: 90 TABLET | Refills: 3 | Status: SHIPPED | OUTPATIENT
Start: 2022-04-07 | End: 2023-02-01

## 2022-04-07 NOTE — TELEPHONE ENCOUNTER
No new care gaps identified.  Powered by [employer]. Reference number: [id number].   4/07/2022 3:14:36 PM CDT

## 2022-04-18 ENCOUNTER — LAB VISIT (OUTPATIENT)
Dept: LAB | Facility: HOSPITAL | Age: 72
End: 2022-04-18
Attending: STUDENT IN AN ORGANIZED HEALTH CARE EDUCATION/TRAINING PROGRAM
Payer: MEDICARE

## 2022-04-18 ENCOUNTER — OFFICE VISIT (OUTPATIENT)
Dept: CARDIOLOGY | Facility: CLINIC | Age: 72
End: 2022-04-18
Payer: MEDICARE

## 2022-04-18 VITALS
HEART RATE: 81 BPM | SYSTOLIC BLOOD PRESSURE: 106 MMHG | BODY MASS INDEX: 43.4 KG/M2 | WEIGHT: 293 LBS | OXYGEN SATURATION: 98 % | DIASTOLIC BLOOD PRESSURE: 78 MMHG | HEIGHT: 69 IN

## 2022-04-18 DIAGNOSIS — Z86.73 HISTORY OF CVA (CEREBROVASCULAR ACCIDENT): ICD-10-CM

## 2022-04-18 DIAGNOSIS — R06.02 SOB (SHORTNESS OF BREATH): Primary | ICD-10-CM

## 2022-04-18 DIAGNOSIS — I26.99 RECURRENT PULMONARY EMBOLI: ICD-10-CM

## 2022-04-18 DIAGNOSIS — R07.89 OTHER CHEST PAIN: ICD-10-CM

## 2022-04-18 DIAGNOSIS — E66.01 MORBID OBESITY WITH BMI OF 45.0-49.9, ADULT: ICD-10-CM

## 2022-04-18 DIAGNOSIS — C50.411 MALIGNANT NEOPLASM OF UPPER-OUTER QUADRANT OF RIGHT BREAST IN FEMALE, ESTROGEN RECEPTOR POSITIVE: ICD-10-CM

## 2022-04-18 DIAGNOSIS — E78.5 HYPERLIPIDEMIA, UNSPECIFIED HYPERLIPIDEMIA TYPE: ICD-10-CM

## 2022-04-18 DIAGNOSIS — Z17.0 MALIGNANT NEOPLASM OF UPPER-OUTER QUADRANT OF RIGHT BREAST IN FEMALE, ESTROGEN RECEPTOR POSITIVE: ICD-10-CM

## 2022-04-18 DIAGNOSIS — I71.40 ABDOMINAL AORTIC ANEURYSM (AAA) WITHOUT RUPTURE: ICD-10-CM

## 2022-04-18 DIAGNOSIS — R26.81 UNSTEADINESS ON FEET: ICD-10-CM

## 2022-04-18 DIAGNOSIS — M79.606 PAIN OF LOWER EXTREMITY, UNSPECIFIED LATERALITY: ICD-10-CM

## 2022-04-18 DIAGNOSIS — I50.32 CHRONIC DIASTOLIC CONGESTIVE HEART FAILURE: ICD-10-CM

## 2022-04-18 DIAGNOSIS — I10 ESSENTIAL HYPERTENSION: ICD-10-CM

## 2022-04-18 LAB
CHOLEST SERPL-MCNC: 229 MG/DL (ref 120–199)
CHOLEST/HDLC SERPL: 5.1 {RATIO} (ref 2–5)
HDLC SERPL-MCNC: 45 MG/DL (ref 40–75)
HDLC SERPL: 19.7 % (ref 20–50)
LDLC SERPL CALC-MCNC: 150.4 MG/DL (ref 63–159)
NONHDLC SERPL-MCNC: 184 MG/DL
TRIGL SERPL-MCNC: 168 MG/DL (ref 30–150)

## 2022-04-18 PROCEDURE — 3078F PR MOST RECENT DIASTOLIC BLOOD PRESSURE < 80 MM HG: ICD-10-PCS | Mod: CPTII,S$GLB,, | Performed by: STUDENT IN AN ORGANIZED HEALTH CARE EDUCATION/TRAINING PROGRAM

## 2022-04-18 PROCEDURE — 99214 PR OFFICE/OUTPT VISIT, EST, LEVL IV, 30-39 MIN: ICD-10-PCS | Mod: S$GLB,,, | Performed by: STUDENT IN AN ORGANIZED HEALTH CARE EDUCATION/TRAINING PROGRAM

## 2022-04-18 PROCEDURE — 1100F PR PT FALLS ASSESS DOC 2+ FALLS/FALL W/INJURY/YR: ICD-10-PCS | Mod: CPTII,S$GLB,, | Performed by: STUDENT IN AN ORGANIZED HEALTH CARE EDUCATION/TRAINING PROGRAM

## 2022-04-18 PROCEDURE — 3008F BODY MASS INDEX DOCD: CPT | Mod: CPTII,S$GLB,, | Performed by: STUDENT IN AN ORGANIZED HEALTH CARE EDUCATION/TRAINING PROGRAM

## 2022-04-18 PROCEDURE — 3008F PR BODY MASS INDEX (BMI) DOCUMENTED: ICD-10-PCS | Mod: CPTII,S$GLB,, | Performed by: STUDENT IN AN ORGANIZED HEALTH CARE EDUCATION/TRAINING PROGRAM

## 2022-04-18 PROCEDURE — 99214 OFFICE O/P EST MOD 30 MIN: CPT | Mod: S$GLB,,, | Performed by: STUDENT IN AN ORGANIZED HEALTH CARE EDUCATION/TRAINING PROGRAM

## 2022-04-18 PROCEDURE — 4010F PR ACE/ARB THEARPY RXD/TAKEN: ICD-10-PCS | Mod: CPTII,S$GLB,, | Performed by: STUDENT IN AN ORGANIZED HEALTH CARE EDUCATION/TRAINING PROGRAM

## 2022-04-18 PROCEDURE — 36415 COLL VENOUS BLD VENIPUNCTURE: CPT | Performed by: STUDENT IN AN ORGANIZED HEALTH CARE EDUCATION/TRAINING PROGRAM

## 2022-04-18 PROCEDURE — 1159F MED LIST DOCD IN RCRD: CPT | Mod: CPTII,S$GLB,, | Performed by: STUDENT IN AN ORGANIZED HEALTH CARE EDUCATION/TRAINING PROGRAM

## 2022-04-18 PROCEDURE — 3288F FALL RISK ASSESSMENT DOCD: CPT | Mod: CPTII,S$GLB,, | Performed by: STUDENT IN AN ORGANIZED HEALTH CARE EDUCATION/TRAINING PROGRAM

## 2022-04-18 PROCEDURE — 99999 PR PBB SHADOW E&M-EST. PATIENT-LVL IV: CPT | Mod: PBBFAC,,, | Performed by: STUDENT IN AN ORGANIZED HEALTH CARE EDUCATION/TRAINING PROGRAM

## 2022-04-18 PROCEDURE — 3074F SYST BP LT 130 MM HG: CPT | Mod: CPTII,S$GLB,, | Performed by: STUDENT IN AN ORGANIZED HEALTH CARE EDUCATION/TRAINING PROGRAM

## 2022-04-18 PROCEDURE — 99999 PR PBB SHADOW E&M-EST. PATIENT-LVL IV: ICD-10-PCS | Mod: PBBFAC,,, | Performed by: STUDENT IN AN ORGANIZED HEALTH CARE EDUCATION/TRAINING PROGRAM

## 2022-04-18 PROCEDURE — 3288F PR FALLS RISK ASSESSMENT DOCUMENTED: ICD-10-PCS | Mod: CPTII,S$GLB,, | Performed by: STUDENT IN AN ORGANIZED HEALTH CARE EDUCATION/TRAINING PROGRAM

## 2022-04-18 PROCEDURE — 4010F ACE/ARB THERAPY RXD/TAKEN: CPT | Mod: CPTII,S$GLB,, | Performed by: STUDENT IN AN ORGANIZED HEALTH CARE EDUCATION/TRAINING PROGRAM

## 2022-04-18 PROCEDURE — 1125F PR PAIN SEVERITY QUANTIFIED, PAIN PRESENT: ICD-10-PCS | Mod: CPTII,S$GLB,, | Performed by: STUDENT IN AN ORGANIZED HEALTH CARE EDUCATION/TRAINING PROGRAM

## 2022-04-18 PROCEDURE — 1125F AMNT PAIN NOTED PAIN PRSNT: CPT | Mod: CPTII,S$GLB,, | Performed by: STUDENT IN AN ORGANIZED HEALTH CARE EDUCATION/TRAINING PROGRAM

## 2022-04-18 PROCEDURE — 80061 LIPID PANEL: CPT | Performed by: STUDENT IN AN ORGANIZED HEALTH CARE EDUCATION/TRAINING PROGRAM

## 2022-04-18 PROCEDURE — 3074F PR MOST RECENT SYSTOLIC BLOOD PRESSURE < 130 MM HG: ICD-10-PCS | Mod: CPTII,S$GLB,, | Performed by: STUDENT IN AN ORGANIZED HEALTH CARE EDUCATION/TRAINING PROGRAM

## 2022-04-18 PROCEDURE — 1159F PR MEDICATION LIST DOCUMENTED IN MEDICAL RECORD: ICD-10-PCS | Mod: CPTII,S$GLB,, | Performed by: STUDENT IN AN ORGANIZED HEALTH CARE EDUCATION/TRAINING PROGRAM

## 2022-04-18 PROCEDURE — 1100F PTFALLS ASSESS-DOCD GE2>/YR: CPT | Mod: CPTII,S$GLB,, | Performed by: STUDENT IN AN ORGANIZED HEALTH CARE EDUCATION/TRAINING PROGRAM

## 2022-04-18 PROCEDURE — 3078F DIAST BP <80 MM HG: CPT | Mod: CPTII,S$GLB,, | Performed by: STUDENT IN AN ORGANIZED HEALTH CARE EDUCATION/TRAINING PROGRAM

## 2022-04-18 RX ORDER — BUTALBITAL, ACETAMINOPHEN AND CAFFEINE 50; 325; 40 MG/1; MG/1; MG/1
1 TABLET ORAL DAILY PRN
Qty: 30 TABLET | Refills: 0 | Status: SHIPPED | OUTPATIENT
Start: 2022-04-18 | End: 2022-05-04 | Stop reason: SDUPTHER

## 2022-04-18 NOTE — PROGRESS NOTES
Subjective:   Patient ID:  Susu Luther is a 72 y.o. female who presents for follow up of No chief complaint on file.    12/1/20  71 yo female, care establish. Prior cardiologist Dr ackerman   [facility] HTN, CVA (2009), Right breast CA, Lumpectomy 3/2019, h/o PE off OAC 2 yrs ago.  AAA, s/p transcutaneous patch by Dr. Bonds, obesity knee OA imbalanced walker dependent  C/o SOB after walking few steps and dizziness. Had vision issue 1 month ago due to uncontrolled HTN  No chest pain  Sleeps with 2 pillows  Decent appetites  Leg calf pain worse at night  No smoking/drinking  ekg today NSR LVH.    ECH normal EF, grade II DD, LAE and PAP 56 mmHG   Chest CTA negative for PE  BP high    A1c controlled    S/p Open subpectoral lymph node biopsy on the right on 12/02/2020 by Dr. Starks  Still right chest, under arm and shoudler supersensitive pain      Congestive Heart Failure  Associated symptoms include shortness of breath. Pertinent negatives include no abdominal pain, chest pain, claudication, near-syncope or palpitations.     2/28/22  Patient was admitted to [facility] for shortness of breath.  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  Started on heparin drip in transition to oral anticoagulation, now on Eliquis.  Recurrent PE.  On Femara. Has another lump in breast on right side, recently seen on PET scan.   Due to TANNER, was told to stop hctz, telmisartan.  She has ran out of clonidine. Does not take the ASA, hydralazine, amlodipine.   Blood pressure normotensive.  Reports severe headaches.  Has been out of Fioricet.  Echocardiogram with normal EF  Reports shortness of breath, chest heaviness mostly right-sided.      3/14/22  Still having SOB due to PE.  No bleeding issues while on eliquis.  Chest pain is substernal to right sided.   Lasix doesn't seem to help.   A reports intermittent leg pain right > left.  DVT ultrasound in-hospital with no  evidence of DVT.  Following Hematology-Oncology.  Patient does not want surgery if needed for possible breast cancer.  Denies syncope, fever, chills.         4/18/22  Comes in with daughter who lives in [place].  Concerned that she may oxygen issues and may need home oxygen. O2 98%  Reports LE swelling and SOB  Reports chest pain comes on with SOB, did not have chest pain prior to PE  Has not been on lasix, has been held  Reports balancing issues- can do PT once symptoms improve   Denies syncope, fever, chills.         Echo 2/28/22  · The left ventricle is normal in size with concentric hypertrophy and normal systolic function.  · The estimated ejection fraction is 60%.  · Normal left ventricular diastolic function.  · Normal right ventricular size with normal right ventricular systolic function.  · Mild to moderate tricuspid regurgitation.  · Normal central venous pressure (3 mmHg).  · The estimated PA systolic pressure is 36 mmHg.       Echo 2019  CONCLUSIONS     1 - Mild left atrial enlargement.     2 - Concentric hypertrophy.     3 - No wall motion abnormalities.     4 - Normal left ventricular systolic function (EF 60-65%).     5 - Impaired LV relaxation, elevated LAP (grade 2 diastolic dysfunction).     6 - Normal right ventricular systolic function .     7 - The estimated PA systolic pressure is 36 mmHg.     8 - Mild tricuspid regurgitation.        Past Medical History:   Diagnosis Date    Chronic diastolic congestive heart failure 8/25/2020    Encounter for blood transfusion     History of repair of aneurysm of abdominal aorta using endovascular stent graft     DR Bonds    Hx of psychiatric care     Hypertension     Major depressive disorder, single episode, moderate with anxious distress 1/14/2020    Malignant neoplasm of upper-outer quadrant of right breast in female, estrogen receptor positive 3/14/2019    radiation    Psychiatric problem     Sleep apnea     Sleep difficulties     Stroke 2009     no residual defect    Therapy        Past Surgical History:   Procedure Laterality Date    APPENDECTOMY      AXILLARY NODE DISSECTION Right 2020    Procedure: LYMPHADENECTOMY, AXILLARY;  Surgeon: Artemio Starks MD;  Location: [facility];  Service: General;  Laterality: Right;    BIOPSY OF AXILLARY NODE Right 3/2/2021    Procedure: BIOPSY, LYMPH NODE, AXILLARY;  Surgeon: Artemio Sutton MD;  Location: [address];  Service: General;  Laterality: Right;    BREAST BIOPSY          BREAST LUMPECTOMY      2019     SECTION      x 1    OOPHORECTOMY      right     SENTINEL LYMPH NODE BIOPSY Right 3/27/2019    Procedure: BIOPSY, LYMPH NODE, SENTINEL;  Surgeon: Artemio Starks MD;  Location: [facility];  Service: General;  Laterality: Right;       Social History     Tobacco Use    Smoking status: Never Smoker    Smokeless tobacco: Never Used   Substance Use Topics    Alcohol use: No    Drug use: No       Family History   Problem Relation Age of Onset    Diabetes Maternal Grandmother     Diabetes Maternal Grandfather     Diabetes Mother     Sickle cell anemia Daughter     Breast cancer Neg Hx     Colon cancer Neg Hx     Ovarian cancer Neg Hx          Review of Systems   Constitutional: Negative for decreased appetite, diaphoresis, fever, malaise/fatigue and night sweats.   HENT: Negative for nosebleeds.    Eyes: Negative for blurred vision and double vision.   Cardiovascular: Positive for dyspnea on exertion. Negative for chest pain, claudication, irregular heartbeat, leg swelling, near-syncope, orthopnea, palpitations, paroxysmal nocturnal dyspnea and syncope.   Respiratory: Positive for shortness of breath. Negative for cough, sleep disturbances due to breathing, snoring, sputum production and wheezing.    Endocrine: Negative for cold intolerance and polyuria.   Hematologic/Lymphatic: Does not bruise/bleed easily.   Skin: Negative for rash.   Musculoskeletal: Negative for back pain,  "falls, joint pain, joint swelling and neck pain.        Right chest breast and shoulder pain   Gastrointestinal: Negative for abdominal pain, heartburn, nausea and vomiting.   Genitourinary: Negative for dysuria, frequency and hematuria.   Neurological: Negative for difficulty with concentration, dizziness, focal weakness, headaches, light-headedness, numbness, seizures and weakness.   Psychiatric/Behavioral: Negative for depression, memory loss and substance abuse. The patient does not have insomnia.    Allergic/Immunologic: Negative for HIV exposure and hives.     Vitals:    04/18/22 1105   BP: 106/78   BP Location: Right arm   Patient Position: Sitting   Pulse: 81   SpO2: 98%   Weight: (!) 141.5 kg (311 lb 15.2 oz)   Height: 5' 9" (1.753 m)         Objective:   Physical Exam  Constitutional:       Comments: Mild distress.    HENT:      Head: Normocephalic.   Eyes:      Pupils: Pupils are equal, round, and reactive to light.   Neck:      Thyroid: No thyromegaly.      Vascular: Normal carotid pulses. No carotid bruit or JVD.   Cardiovascular:      Rate and Rhythm: Normal rate and regular rhythm.  No extrasystoles are present.     Chest Wall: PMI is not displaced.      Pulses: Normal pulses.      Heart sounds: Normal heart sounds. No murmur heard.    No gallop. No S3 sounds.   Pulmonary:      Effort: No respiratory distress.      Breath sounds: Normal breath sounds. No stridor.   Abdominal:      General: Bowel sounds are normal.      Palpations: Abdomen is soft.      Tenderness: There is no abdominal tenderness. There is no rebound.   Musculoskeletal:         General: Normal range of motion.   Skin:     Findings: No rash.   Neurological:      Mental Status: She is alert and oriented to person, place, and time.   Psychiatric:         Behavior: Behavior normal.         Lab Results   Component Value Date    CHOL 221 (H) 10/19/2020    CHOL 231 (H) 08/25/2020    CHOL 229 (H) 05/18/2019     Lab Results   Component Value " Date    HDL 55 10/19/2020    HDL 49 08/25/2020    HDL 43 05/18/2019     Lab Results   Component Value Date    LDLCALC 137.4 10/19/2020    LDLCALC 135.6 08/25/2020    LDLCALC 148.0 05/18/2019     Lab Results   Component Value Date    TRIG 143 10/19/2020    TRIG 232 (H) 08/25/2020    TRIG 190 (H) 05/18/2019     Lab Results   Component Value Date    CHOLHDL 24.9 10/19/2020    CHOLHDL 21.2 08/25/2020    CHOLHDL 18.8 (L) 05/18/2019       Chemistry        Component Value Date/Time     03/09/2022 1619    K 4.3 02/28/2022 1434     03/09/2022 1619    CO2 27 03/09/2022 1619    BUN 18 03/09/2022 1619    CREATININE 1.4 03/09/2022 1619     03/09/2022 1619        Component Value Date/Time    CALCIUM 10.5 03/09/2022 1619    ALKPHOS 144 (H) 03/09/2022 1619    AST 13 03/09/2022 1619    ALT 10 03/09/2022 1619    BILITOT 0.2 03/09/2022 1619    ESTGFRAFRICA 44 (A) 03/09/2022 1619    EGFRNONAA 38 (A) 03/09/2022 1619          Lab Results   Component Value Date    HGBA1C 5.9 (H) 05/18/2019     Lab Results   Component Value Date    TSH 1.460 10/18/2021     Lab Results   Component Value Date    INR 0.9 02/15/2022    INR 1.0 11/10/2020    INR 1.0 05/19/2019     Lab Results   Component Value Date    WBC 11.66 03/09/2022    HGB 11.9 (L) 03/09/2022    HCT 38.5 03/09/2022    MCV 82 03/09/2022     03/09/2022     BMP  Sodium   Date Value Ref Range Status   03/09/2022 140 136 - 145 mmol/L Final     Potassium   Date Value Ref Range Status   02/28/2022 4.3 3.5 - 5.1 mmol/L Final     Chloride   Date Value Ref Range Status   03/09/2022 102 95 - 110 mmol/L Final     CO2   Date Value Ref Range Status   03/09/2022 27 23 - 29 mmol/L Final     BUN   Date Value Ref Range Status   03/09/2022 18 8 - 23 mg/dL Final     Creatinine   Date Value Ref Range Status   03/09/2022 1.4 0.5 - 1.4 mg/dL Final     Calcium   Date Value Ref Range Status   03/09/2022 10.5 8.7 - 10.5 mg/dL Final     Anion Gap   Date Value Ref Range Status   03/09/2022  11 8 - 16 mmol/L Final     eGFR if    Date Value Ref Range Status   03/09/2022 44 (A) >60 mL/min/1.73 m^2 Final     eGFR if non    Date Value Ref Range Status   03/09/2022 38 (A) >60 mL/min/1.73 m^2 Final     Comment:     Calculation used to obtain the estimated glomerular filtration  rate (eGFR) is the CKD-EPI equation.        BNP  @LABRCNTIP(BNP,BNPTRIAGEBLO)@  @LABRCNTIP(troponini)@  CrCl cannot be calculated (Patient's most recent lab result is older than the maximum 7 days allowed.).  No results found in the last 24 hours.  No results found in the last 24 hours.  No results found in the last 24 hours.    Assessment:      1. SOB (shortness of breath)    2. Unsteadiness on feet    3. Pain of lower extremity, unspecified laterality    4. Chronic diastolic congestive heart failure    5. Essential hypertension    6. Morbid obesity with BMI of 45.0-49.9, adult    7. Abdominal aortic aneurysm (AAA) without rupture    8. Malignant neoplasm of upper-outer quadrant of right breast in female, estrogen receptor positive    9. Other chest pain    10. History of CVA (cerebrovascular accident)    11. Hyperlipidemia, unspecified hyperlipidemia type    12. Recurrent pulmonary emboli          Plan:     Diastolic heart failure  Reports shortness of breath, most likely due to PE  Echo with normal EF, mild-mod TR  Will obtain BNP   Restart Lasix daily    Right leg pain- stable  DVT ultrasound recently without evidence of DVT   Normal ABIs    Recurrent pulmonary embolus  Reports shortness of breath, chest pain mostly right-sided  Will reach out to PCP regarding evaluation for home O2  Continue OAC    Hypertension  Controlled  Continue holding antihypertensives    History of CVA  Denies symptoms  Currently not on aspirin as she is taking Eliquis  Continue statin    HLD   as of 2020  Continue statin  Lipid panel pending     History of right breast cancer  Now has possible recurrent breast  cancer  Follow-up with Hematology-Oncology    AAA s/p transcutaneous patch  Stable    Morbid obesity, BMI > 40  Low-salt, low-fat diet  Exercise as tolerated      All labs and cardiac procedures reviewed.  If symptoms worsen, go to ED    Return to clinic in 1 month    Arlene Hobbs MD  Cardiology Staff

## 2022-04-20 ENCOUNTER — TELEPHONE (OUTPATIENT)
Dept: ADMINISTRATIVE | Facility: HOSPITAL | Age: 72
End: 2022-04-20
Payer: MEDICARE

## 2022-04-27 ENCOUNTER — OFFICE VISIT (OUTPATIENT)
Dept: INTERNAL MEDICINE | Facility: CLINIC | Age: 72
End: 2022-04-27
Payer: MEDICARE

## 2022-04-27 VITALS
DIASTOLIC BLOOD PRESSURE: 80 MMHG | TEMPERATURE: 97 F | RESPIRATION RATE: 18 BRPM | HEART RATE: 60 BPM | HEIGHT: 69 IN | WEIGHT: 293 LBS | OXYGEN SATURATION: 95 % | BODY MASS INDEX: 43.4 KG/M2 | SYSTOLIC BLOOD PRESSURE: 130 MMHG

## 2022-04-27 DIAGNOSIS — F41.9 ANXIETY: ICD-10-CM

## 2022-04-27 DIAGNOSIS — Z17.0 MALIGNANT NEOPLASM OF UPPER-OUTER QUADRANT OF RIGHT BREAST IN FEMALE, ESTROGEN RECEPTOR POSITIVE: Chronic | ICD-10-CM

## 2022-04-27 DIAGNOSIS — M79.605 BILATERAL LEG PAIN: ICD-10-CM

## 2022-04-27 DIAGNOSIS — L08.9 SKIN INFECTION: Primary | ICD-10-CM

## 2022-04-27 DIAGNOSIS — M79.604 BILATERAL LEG PAIN: ICD-10-CM

## 2022-04-27 DIAGNOSIS — M79.601 RIGHT ARM PAIN: ICD-10-CM

## 2022-04-27 DIAGNOSIS — C50.411 MALIGNANT NEOPLASM OF UPPER-OUTER QUADRANT OF RIGHT BREAST IN FEMALE, ESTROGEN RECEPTOR POSITIVE: Chronic | ICD-10-CM

## 2022-04-27 PROCEDURE — 99213 OFFICE O/P EST LOW 20 MIN: CPT | Mod: S$GLB,,, | Performed by: FAMILY MEDICINE

## 2022-04-27 PROCEDURE — 99999 PR PBB SHADOW E&M-EST. PATIENT-LVL V: CPT | Mod: PBBFAC,,, | Performed by: FAMILY MEDICINE

## 2022-04-27 PROCEDURE — 99999 PR PBB SHADOW E&M-EST. PATIENT-LVL V: ICD-10-PCS | Mod: PBBFAC,,, | Performed by: FAMILY MEDICINE

## 2022-04-27 PROCEDURE — 99213 PR OFFICE/OUTPT VISIT, EST, LEVL III, 20-29 MIN: ICD-10-PCS | Mod: S$GLB,,, | Performed by: FAMILY MEDICINE

## 2022-04-27 PROCEDURE — 99215 OFFICE O/P EST HI 40 MIN: CPT | Mod: PBBFAC | Performed by: FAMILY MEDICINE

## 2022-04-27 RX ORDER — OXYCODONE AND ACETAMINOPHEN 7.5; 325 MG/1; MG/1
1 TABLET ORAL EVERY 8 HOURS PRN
Qty: 60 TABLET | Refills: 0 | Status: SHIPPED | OUTPATIENT
Start: 2022-04-27 | End: 2022-05-19 | Stop reason: SDUPTHER

## 2022-04-27 RX ORDER — SULFAMETHOXAZOLE AND TRIMETHOPRIM 800; 160 MG/1; MG/1
1 TABLET ORAL 2 TIMES DAILY
Qty: 14 TABLET | Refills: 0 | Status: SHIPPED | OUTPATIENT
Start: 2022-04-27 | End: 2022-05-19

## 2022-04-27 RX ORDER — ALPRAZOLAM 0.5 MG/1
0.5 TABLET ORAL 2 TIMES DAILY PRN
Qty: 60 TABLET | Refills: 0 | Status: SHIPPED | OUTPATIENT
Start: 2022-04-27 | End: 2022-05-23 | Stop reason: SDUPTHER

## 2022-04-27 RX ORDER — SULFAMETHOXAZOLE AND TRIMETHOPRIM 800; 160 MG/1; MG/1
1 TABLET ORAL 2 TIMES DAILY
Qty: 14 TABLET | Refills: 0 | Status: SHIPPED | OUTPATIENT
Start: 2022-04-27 | End: 2022-04-27 | Stop reason: SDUPTHER

## 2022-04-27 NOTE — PROGRESS NOTES
Subjective:       Patient ID: Susu Luther is a 72 y.o. female.    Chief Complaint: Follow-up    HPI Ms. Luther presents today for follow up and pain in arm.     Couple nights ago had a leg cramp that woke her from sleeping.   She massaged leg and noted a tender bruise     Right arm hurting  Starting at the shoulder coming down  Breast sore   Tried wearing bra but it is painful    She has had symptoms with the pain in the arm before and feels it may be related to lymph nodes.     Review of Systems   Constitutional: Negative.    HENT: Negative.    Gastrointestinal: Negative.    Musculoskeletal: Positive for arthralgias.   Psychiatric/Behavioral: Negative.            Past Medical History:   Diagnosis Date    Chronic diastolic congestive heart failure 2020    Encounter for blood transfusion     History of repair of aneurysm of abdominal aorta using endovascular stent graft     DR Bonds     of psychiatric care     Hypertension     Major depressive disorder, single episode, moderate with anxious distress 2020    Malignant neoplasm of upper-outer quadrant of right breast in female, estrogen receptor positive 3/14/2019    radiation    Psychiatric problem     Sleep apnea     Sleep difficulties     Stroke     no residual defect    Therapy      Past Surgical History:   Procedure Laterality Date    APPENDECTOMY      AXILLARY NODE DISSECTION Right 2020    Procedure: LYMPHADENECTOMY, AXILLARY;  Surgeon: Artemio Starks MD;  [facility];  Service: General;  Laterality: Right;    BIOPSY OF AXILLARY NODE Right 3/2/2021    Procedure: BIOPSY, LYMPH NODE, AXILLARY;  Surgeon: Artemio Sutton MD;  Location: [address];  Service: General;  Laterality: Right;    BREAST BIOPSY      2019    BREAST LUMPECTOMY      2019     SECTION      x 1    OOPHORECTOMY      right     SENTINEL LYMPH NODE BIOPSY Right 3/27/2019    Procedure: BIOPSY, LYMPH NODE, SENTINEL;  Surgeon:  Artemio Starks MD;  Location: [facility];  Service: General;  Laterality: Right;     Family History   Problem Relation Age of Onset    Diabetes Maternal Grandmother     Diabetes Maternal Grandfather     Diabetes Mother     Sickle cell anemia Daughter     Breast cancer Neg Hx     Colon cancer Neg Hx     Ovarian cancer Neg Hx      Social History     Socioeconomic History    Marital status:      Spouse name: Campos Luther    Number of children: 2   Tobacco Use    Smoking status: Never Smoker    Smokeless tobacco: Never Used   Substance and Sexual Activity    Alcohol use: No    Drug use: No    Sexual activity: Yes     Partners: Male     Birth control/protection: Post-menopausal       Objective:        Physical Exam  Vitals reviewed.   Constitutional:       Appearance: She is obese.   HENT:      Head: Normocephalic and atraumatic.   Pulmonary:      Effort: Pulmonary effort is normal.   Neurological:      Mental Status: She is alert and oriented to person, place, and time.   Psychiatric:         Mood and Affect: Mood normal.         Behavior: Behavior normal.         Picture of right leg      Results for orders placed or performed in visit on 04/18/22   B-TYPE NATRIURETIC PEPTIDE   Result Value Ref Range    BNP 22 0 - 99 pg/mL   Magnesium   Result Value Ref Range    Magnesium 2.2 1.6 - 2.6 mg/dL   Vitamin B12   Result Value Ref Range    Vitamin B-12 741 210 - 950 pg/mL     *Note: Due to a large number of results and/or encounters for the requested time period, some results have not been displayed. A complete set of results can be found in Results Review.       Assessment/Plan:     Skin infection  -     Discontinue: sulfamethoxazole-trimethoprim 800-160mg (BACTRIM DS) 800-160 mg Tab; Take 1 tablet by mouth 2 (two) times daily.  Dispense: 14 tablet; Refill: 0  -     sulfamethoxazole-trimethoprim 800-160mg (BACTRIM DS) 800-160 mg Tab; Take 1 tablet by mouth 2 (two) times daily.  Dispense: 14 tablet;  Refill: 0    Malignant neoplasm of upper-outer quadrant of right breast in female, estrogen receptor positive  -     ALPRAZolam (XANAX) 0.5 MG tablet; Take 1 tablet (0.5 mg total) by mouth 2 (two) times daily as needed for Insomnia or Anxiety.  Dispense: 60 tablet; Refill: 0    Anxiety  -     ALPRAZolam (XANAX) 0.5 MG tablet; Take 1 tablet (0.5 mg total) by mouth 2 (two) times daily as needed for Insomnia or Anxiety.  Dispense: 60 tablet; Refill: 0    Bilateral leg pain  -     oxyCODONE-acetaminophen (PERCOCET) 7.5-325 mg per tablet; Take 1 tablet by mouth every 8 (eight) hours as needed for Pain.  Dispense: 60 tablet; Refill: 0    Right arm pain  -     oxyCODONE-acetaminophen (PERCOCET) 7.5-325 mg per tablet; Take 1 tablet by mouth every 8 (eight) hours as needed for Pain.  Dispense: 60 tablet; Refill: 0      Not clear if this is an infection   May be bruised   Tender to palpation and raised     Will follow up with hematology on tomorrow       Follow up as needed     Peace Arias MD  [facility]   Family Medicine

## 2022-04-28 ENCOUNTER — LAB VISIT (OUTPATIENT)
Dept: LAB | Facility: HOSPITAL | Age: 72
End: 2022-04-28
Attending: INTERNAL MEDICINE
Payer: MEDICARE

## 2022-04-28 ENCOUNTER — OFFICE VISIT (OUTPATIENT)
Dept: HEMATOLOGY/ONCOLOGY | Facility: CLINIC | Age: 72
End: 2022-04-28
Payer: MEDICARE

## 2022-04-28 VITALS
BODY MASS INDEX: 39.68 KG/M2 | OXYGEN SATURATION: 99 % | SYSTOLIC BLOOD PRESSURE: 185 MMHG | RESPIRATION RATE: 20 BRPM | HEIGHT: 72 IN | WEIGHT: 293 LBS | DIASTOLIC BLOOD PRESSURE: 108 MMHG | HEART RATE: 87 BPM | TEMPERATURE: 99 F

## 2022-04-28 DIAGNOSIS — C50.411 MALIGNANT NEOPLASM OF UPPER-OUTER QUADRANT OF RIGHT BREAST IN FEMALE, ESTROGEN RECEPTOR POSITIVE: Chronic | ICD-10-CM

## 2022-04-28 DIAGNOSIS — Z17.0 MALIGNANT NEOPLASM OF UPPER-OUTER QUADRANT OF RIGHT BREAST IN FEMALE, ESTROGEN RECEPTOR POSITIVE: Chronic | ICD-10-CM

## 2022-04-28 DIAGNOSIS — I26.99 ACUTE PULMONARY EMBOLISM, UNSPECIFIED PULMONARY EMBOLISM TYPE, UNSPECIFIED WHETHER ACUTE COR PULMONALE PRESENT: ICD-10-CM

## 2022-04-28 DIAGNOSIS — Z86.711 HISTORY OF PULMONARY EMBOLISM: ICD-10-CM

## 2022-04-28 LAB
ALBUMIN SERPL BCP-MCNC: 2.9 G/DL (ref 3.5–5.2)
ALP SERPL-CCNC: 104 U/L (ref 55–135)
ALT SERPL W/O P-5'-P-CCNC: 8 U/L (ref 10–44)
ANION GAP SERPL CALC-SCNC: 11 MMOL/L (ref 8–16)
AST SERPL-CCNC: 11 U/L (ref 10–40)
BASOPHILS # BLD AUTO: 0.08 K/UL (ref 0–0.2)
BASOPHILS NFR BLD: 0.9 % (ref 0–1.9)
BILIRUB SERPL-MCNC: 0.3 MG/DL (ref 0.1–1)
BUN SERPL-MCNC: 21 MG/DL (ref 8–23)
CALCIUM SERPL-MCNC: 9.6 MG/DL (ref 8.7–10.5)
CHLORIDE SERPL-SCNC: 109 MMOL/L (ref 95–110)
CO2 SERPL-SCNC: 22 MMOL/L (ref 23–29)
CREAT SERPL-MCNC: 1.3 MG/DL (ref 0.5–1.4)
DIFFERENTIAL METHOD: ABNORMAL
EOSINOPHIL # BLD AUTO: 0.2 K/UL (ref 0–0.5)
EOSINOPHIL NFR BLD: 1.7 % (ref 0–8)
ERYTHROCYTE [DISTWIDTH] IN BLOOD BY AUTOMATED COUNT: 16.5 % (ref 11.5–14.5)
EST. GFR  (AFRICAN AMERICAN): 48 ML/MIN/1.73 M^2
EST. GFR  (NON AFRICAN AMERICAN): 42 ML/MIN/1.73 M^2
GLUCOSE SERPL-MCNC: 109 MG/DL (ref 70–110)
HCT VFR BLD AUTO: 34.5 % (ref 37–48.5)
HGB BLD-MCNC: 10.7 G/DL (ref 12–16)
IMM GRANULOCYTES # BLD AUTO: 0.05 K/UL (ref 0–0.04)
IMM GRANULOCYTES NFR BLD AUTO: 0.6 % (ref 0–0.5)
LYMPHOCYTES # BLD AUTO: 2.2 K/UL (ref 1–4.8)
LYMPHOCYTES NFR BLD: 25.4 % (ref 18–48)
MCH RBC QN AUTO: 24.7 PG (ref 27–31)
MCHC RBC AUTO-ENTMCNC: 31 G/DL (ref 32–36)
MCV RBC AUTO: 80 FL (ref 82–98)
MONOCYTES # BLD AUTO: 0.7 K/UL (ref 0.3–1)
MONOCYTES NFR BLD: 7.5 % (ref 4–15)
NEUTROPHILS # BLD AUTO: 5.6 K/UL (ref 1.8–7.7)
NEUTROPHILS NFR BLD: 63.9 % (ref 38–73)
NRBC BLD-RTO: 0 /100 WBC
PLATELET # BLD AUTO: 268 K/UL (ref 150–450)
PMV BLD AUTO: 9.5 FL (ref 9.2–12.9)
POTASSIUM SERPL-SCNC: 4.6 MMOL/L (ref 3.5–5.1)
PROT SERPL-MCNC: 7 G/DL (ref 6–8.4)
RBC # BLD AUTO: 4.34 M/UL (ref 4–5.4)
SODIUM SERPL-SCNC: 142 MMOL/L (ref 136–145)
WBC # BLD AUTO: 8.69 K/UL (ref 3.9–12.7)

## 2022-04-28 PROCEDURE — 99215 PR OFFICE/OUTPT VISIT, EST, LEVL V, 40-54 MIN: ICD-10-PCS | Mod: S$GLB,,, | Performed by: INTERNAL MEDICINE

## 2022-04-28 PROCEDURE — 1159F MED LIST DOCD IN RCRD: CPT | Mod: CPTII,S$GLB,, | Performed by: INTERNAL MEDICINE

## 2022-04-28 PROCEDURE — 80053 COMPREHEN METABOLIC PANEL: CPT | Performed by: INTERNAL MEDICINE

## 2022-04-28 PROCEDURE — 3288F PR FALLS RISK ASSESSMENT DOCUMENTED: ICD-10-PCS | Mod: CPTII,S$GLB,, | Performed by: INTERNAL MEDICINE

## 2022-04-28 PROCEDURE — 1125F PR PAIN SEVERITY QUANTIFIED, PAIN PRESENT: ICD-10-PCS | Mod: CPTII,S$GLB,, | Performed by: INTERNAL MEDICINE

## 2022-04-28 PROCEDURE — 1159F PR MEDICATION LIST DOCUMENTED IN MEDICAL RECORD: ICD-10-PCS | Mod: CPTII,S$GLB,, | Performed by: INTERNAL MEDICINE

## 2022-04-28 PROCEDURE — 3080F PR MOST RECENT DIASTOLIC BLOOD PRESSURE >= 90 MM HG: ICD-10-PCS | Mod: CPTII,S$GLB,, | Performed by: INTERNAL MEDICINE

## 2022-04-28 PROCEDURE — 99499 UNLISTED E&M SERVICE: CPT | Mod: S$GLB,,, | Performed by: INTERNAL MEDICINE

## 2022-04-28 PROCEDURE — 85025 COMPLETE CBC W/AUTO DIFF WBC: CPT | Performed by: INTERNAL MEDICINE

## 2022-04-28 PROCEDURE — 36415 COLL VENOUS BLD VENIPUNCTURE: CPT | Performed by: INTERNAL MEDICINE

## 2022-04-28 PROCEDURE — 3288F FALL RISK ASSESSMENT DOCD: CPT | Mod: CPTII,S$GLB,, | Performed by: INTERNAL MEDICINE

## 2022-04-28 PROCEDURE — 1125F AMNT PAIN NOTED PAIN PRSNT: CPT | Mod: CPTII,S$GLB,, | Performed by: INTERNAL MEDICINE

## 2022-04-28 PROCEDURE — 1100F PR PT FALLS ASSESS DOC 2+ FALLS/FALL W/INJURY/YR: ICD-10-PCS | Mod: CPTII,S$GLB,, | Performed by: INTERNAL MEDICINE

## 2022-04-28 PROCEDURE — 99999 PR PBB SHADOW E&M-EST. PATIENT-LVL IV: ICD-10-PCS | Mod: PBBFAC,,, | Performed by: INTERNAL MEDICINE

## 2022-04-28 PROCEDURE — 99215 OFFICE O/P EST HI 40 MIN: CPT | Mod: S$GLB,,, | Performed by: INTERNAL MEDICINE

## 2022-04-28 PROCEDURE — 3077F SYST BP >= 140 MM HG: CPT | Mod: CPTII,S$GLB,, | Performed by: INTERNAL MEDICINE

## 2022-04-28 PROCEDURE — 1100F PTFALLS ASSESS-DOCD GE2>/YR: CPT | Mod: CPTII,S$GLB,, | Performed by: INTERNAL MEDICINE

## 2022-04-28 PROCEDURE — 99499 RISK ADDL DX/OHS AUDIT: ICD-10-PCS | Mod: S$GLB,,, | Performed by: INTERNAL MEDICINE

## 2022-04-28 PROCEDURE — 3077F PR MOST RECENT SYSTOLIC BLOOD PRESSURE >= 140 MM HG: ICD-10-PCS | Mod: CPTII,S$GLB,, | Performed by: INTERNAL MEDICINE

## 2022-04-28 PROCEDURE — 3008F PR BODY MASS INDEX (BMI) DOCUMENTED: ICD-10-PCS | Mod: CPTII,S$GLB,, | Performed by: INTERNAL MEDICINE

## 2022-04-28 PROCEDURE — 4010F PR ACE/ARB THEARPY RXD/TAKEN: ICD-10-PCS | Mod: CPTII,S$GLB,, | Performed by: INTERNAL MEDICINE

## 2022-04-28 PROCEDURE — 3080F DIAST BP >= 90 MM HG: CPT | Mod: CPTII,S$GLB,, | Performed by: INTERNAL MEDICINE

## 2022-04-28 PROCEDURE — 3008F BODY MASS INDEX DOCD: CPT | Mod: CPTII,S$GLB,, | Performed by: INTERNAL MEDICINE

## 2022-04-28 PROCEDURE — 4010F ACE/ARB THERAPY RXD/TAKEN: CPT | Mod: CPTII,S$GLB,, | Performed by: INTERNAL MEDICINE

## 2022-04-28 PROCEDURE — 99999 PR PBB SHADOW E&M-EST. PATIENT-LVL IV: CPT | Mod: PBBFAC,,, | Performed by: INTERNAL MEDICINE

## 2022-04-28 NOTE — ASSESSMENT & PLAN NOTE
72-year-old female with stage IIA (pT2, pN0(sn), cM0, G2, ER+, AR-, HER2-) invasive lobular carcinoma of right breast, status post lumpectomy with sentinel lymph node biopsy in 2019.  Currently on Arimidex and tolerating well.  DEXA scan ordered.  Yet to be obtained.     Unfortunately, imaging studies have shown persistently enlarged right subpectoral lymph node as well as FDG avid left posterior cervical and supraclavicular lymph nodes.  Multiple biopsies in the past have been nondiagnostic for malignancy.  Case was presented at multi-disciplinary tumor conference with recommendation to continue Arimidex.  Patient was not satisfied with recommendation a requested for 2nd opinion.    She was evaluated by Dr. Sutton in [place] who recommended MRI of breast.    MRI was performed on 02/23/2021 and showed lymph node mass deep to the right pectoralis minor muscle measuring 6.6 x 4.7 x 2.9 cm. There were also adjacent satellite lymph nodes anteriorly to the dominant lymph node measured 1.1 x 0.8 cm and 0.6 x 0.4 cm.  Also described was a mass effect upon the right subclavian vein.  The mass does not in case or envelope the subclavian neurovascular structures.     Given the above findings, recommendation was made to excise the mass if not for diagnostic purposes for pain management.  She underwent lymph node excision on 03/01/2021, pathology returned back as lymph node with follicular hyperplasia. No evidence of metastatic carcinoma or lymphoma.    She was continued on Arimidex with plan for short interval PET scan.     Patient had a PET scan on 04/14/2021 that showed interim right axillary lymph node excision with large residual hypermetabolic right subpectoral lymph node mass.  Also noted interval development of subcentimeter mildly FDG avid left posterior cervical and supraclavicular lymph nodes. No FDG avid mediastinal or hilar nodes.No FDG avid pulmonary nodules/masses.      Had diagnostic bilateral mammography  on 09/24/2021, results showed a left breast subcentimeter mass at the 9 o'clock position which is new when compared to mammography from 2019.  Will plan a short-interval mammogram in 6 months to re-assess left breast mass.There was no mammographic evidence of disease in the right breast.     Saw Dr. Sutton in [place] who recommended a repeat PET-CT scan. Results from PET-CT scan showed interval enlargement of subpectoral mass on the right with sustained highly FDG avidity.    Me was she continues to complain of shoulder pain and breast discomfort.  Most recently CT neck chest with contrast was obtained on 03/17/2022, when compared to PET scan from October of 2021, the right subpectoral lymph node has not changed and still measures about 6 cm maximally.    Given continuing discomfort and pain associated with these enlarged lymph nodes, will plan on discussing this case again at tumor conference.  Meanwhile I recommend continued Arimidex and pain management.     Return in 4 weeks with repeat labs or sooner if needed.

## 2022-04-28 NOTE — PROGRESS NOTES
Subjective:      DATE OF VISIT: 4/28/22     ?  Patient ID:?Susu Luther is a 72 y.o. female.?? MR#: [id number]   ?   PRIMARY ONCOLOGIST: Dr. Huynh --> Dr. Boles    ? Primary Care Providers:  Peace Arias MD, MD (General)     CHIEF COMPLAINT: ?  Follow-up, discussion of multidisciplinary tumor board consensus recommendation?? ?   ?   ONCOLOGIC DIAGNOSIS:  Stage IIA, pT2 pN0 cM0 grade 2 ER positive, [place] negative, HER2 negative right breast invasive lobular carcinoma   ?   CURRENT TREATMENT:  Anastrozole    PAST TREATMENT:  Lumpectomy and sentinel lymph node biopsy, right, Dr. Starks, 02/28/2019  ?   ONCOLOGIC HISTORY:   ?   Oncology History   Malignant neoplasm of upper-outer quadrant of right breast in female, estrogen receptor positive   3/14/2019 Initial Diagnosis    Malignant neoplasm of upper-outer quadrant of right breast in female, estrogen receptor positive     3/27/2019 Cancer Staged    Staging form: Breast, AJCC 8th Edition  - Pathologic stage from 3/27/2019: Stage IIA (pT2, pN0(sn), cM0, G2, ER+, [place]-, HER2-) - Signed by Guido Huynh MD on 4/4/2019 5/20/2019 - 6/18/2019 Radiation Therapy    Treatment Site Ref. ID Energy Dose/Fx (Gy) #Fx Dose Correction (Gy) Total Dose (Gy) Start Date End Date Elapsed Days   Boost Boost 18X 2.5 4 / 4 0 10 6/13/2019 6/18/2019 5   RtBreastAddMV Rt Breast 18X/6X 2.66 16 / 16 0 42.56 5/20/2019 6/12/2019 23            Cancer Staging  Malignant neoplasm of upper-outer quadrant of right breast in female, estrogen receptor positive  - Pathologic stage from 3/27/2019: Stage IIA (pT2, pN0(sn), cM0, G2, ER+, [place]-, HER2-) - Signed by Guido Huynh MD on 4/4/2019        HPI    I had the pleasure of meeting Ms. Luther who is a 72-year-old obese  female with stage IIA (pT2, pN0(sn), cM0, G2, ER+, [place]-, HER2-) invasive lobular carcinoma of right breast, status post lumpectomy with sentinel lymph node biopsy in 2019.  She has since been on aromatase inhibitor  and has been tolerating well.    Restaging PET-CT scan performed on 11/04/2020 revealed large hypermetabolic right subpectoral lymph node mass measuring 6.9 x 3.2 cm with SUV max of 13.0.    Right chest wall lymph node biopsy performed on 11/10/2020 revealed fragments of benign lymph node with no evidence of malignancy.  Excisional biopsy of right subpectoral lymph node was again performed on 12/02/2020 and returned as benign with no evidence of metastatic disease.    Case was discussed at the tumor conference and recommendation was to continue aromatase inhibitor with plan for repeat short interval imaging to reassess the right subpectoral lymph node mass.  This was discussed with patient however she was not satisfied with the recommendation and wanted a 2nd opinion with a different oncologist.    During previous visit with me, we decided to refer to breast surgeon in [facility]. She had MRI and has a planned surgical resection on 3/1/21.   Status post  Lymph node excision on 03/01/2021.       Interval Hx: 71 yo female with Stage IIA, pT2 pN0 cM0 grade 2 ER positive, [place] negative, HER2 negative right breast invasive lobular carcinoma, s/p lumpectomy and sentinel LN sampling, currently on endocrine therapy. Presents today for routine follow up. Had recent PE, currently on eliquis.     She c/o left breast lump, had mammogram and US breast on 8/24/21 which showed a subcentimeter left breast mass. Denies shortness of breath, chest pain, fever, chills,diarrhea or dysuria. Continues to complain bilateral shoulder pain.     Review of Systems    ?   A comprehensive 14-point review of systems was reviewed with patient and was negative other than as specified above.   ?   PAST MEDICAL HISTORY:   Past Medical History:   Diagnosis Date    Chronic diastolic congestive heart failure 8/25/2020    Encounter for blood transfusion     History of repair of aneurysm of abdominal aorta using endovascular stent graft     DR Bonds    Hx of  psychiatric care     Hypertension     Major depressive disorder, single episode, moderate with anxious distress 2020    Malignant neoplasm of upper-outer quadrant of right breast in female, estrogen receptor positive 3/14/2019    radiation    Psychiatric problem     Sleep apnea     Sleep difficulties     Stroke 2009    no residual defect    Therapy     ?     PAST SURGICAL HISTORY:   Past Surgical History:   Procedure Laterality Date    APPENDECTOMY      AXILLARY NODE DISSECTION Right 2020    Procedure: LYMPHADENECTOMY, AXILLARY;  Surgeon: Artemio Starks MD;  Location: [facility] OR;  Service: General;  Laterality: Right;    BIOPSY OF AXILLARY NODE Right 3/2/2021    Procedure: BIOPSY, LYMPH NODE, AXILLARY;  Surgeon: Artemio Sutton MD;  Location: [address];  Service: General;  Laterality: Right;    BREAST BIOPSY      2019    BREAST LUMPECTOMY      2019     SECTION      x 1    OOPHORECTOMY      right     SENTINEL LYMPH NODE BIOPSY Right 3/27/2019    Procedure: BIOPSY, LYMPH NODE, SENTINEL;  Surgeon: Artemio Starks MD;  Location: [facility] OR;  Service: General;  Laterality: Right;      ?   ALLERGIES:   Allergies as of 2022 - Reviewed 2022   Allergen Reaction Noted    Pcn [penicillins] Hives 2019      ?   MEDICATIONS:?   Outpatient Medications Marked as Taking for the 22 encounter (Office Visit) with Jose Clarke MD   Medication Sig Dispense Refill    albuterol (VENTOLIN HFA) 90 mcg/actuation inhaler Inhale 2 puffs into the lungs every 4 (four) hours as needed for Shortness of Breath. 18 g 11    ALPRAZolam (XANAX) 0.5 MG tablet Take 1 tablet (0.5 mg total) by mouth 2 (two) times daily as needed for Insomnia or Anxiety. 60 tablet 0    amLODIPine (NORVASC) 10 MG tablet Take 1 tablet (10 mg total) by mouth once daily. 90 tablet 2    apixaban (ELIQUIS) 5 mg Tab Take 1 tablet (5 mg total) by mouth 2 (two) times daily. 180 tablet 3    atorvastatin  (LIPITOR) 40 MG tablet Take 1 tablet (40 mg total) by mouth once daily. 90 tablet 3    butalbital-acetaminophen-caffeine -40 mg (FIORICET, ESGIC) -40 mg per tablet Take 1 tablet by mouth daily as needed for Pain or Headaches. 30 tablet 0    cholecalciferol, vitamin D3, 1,250 mcg (50,000 unit) capsule Take 1 capsule (50,000 Units total) by mouth once a week. (Patient taking differently: Take 50,000 Units by mouth once a week. on Friday) 12 capsule 0    diclofenac sodium (VOLTAREN) 1 % Gel Apply 2 grams topically once daily. 450 g 1    doxazosin (CARDURA) 4 MG tablet Take 1 tablet (4 mg total) by mouth every evening. 90 tablet 0    furosemide (LASIX) 20 MG tablet take 1/2 tablet by mouth once a day. 90 tablet 0    gabapentin (NEURONTIN) 600 MG tablet Take 1 tablet (600 mg total) by mouth 3 (three) times daily. 270 tablet 0    hydrALAZINE (APRESOLINE) 100 MG tablet once daily.      hydroCHLOROthiazide (HYDRODIURIL) 50 MG tablet Take 1 tablet (50 mg total) by mouth once daily. 90 tablet 3    hydrOXYzine HCL (ATARAX) 25 MG tablet Take 1 tablet (25 mg total) by mouth every evening. 60 tablet 0    letrozole (FEMARA) 2.5 mg Tab Take 1 tablet by mouth once daily 30 tablet 0    LIDOcaine (LIDODERM) 5 % Place 2 patches onto the skin once daily. Remove & Discard patch within 12 hours or as directed by MD 90 patch 1    multivitamin (THERAGRAN) per tablet Take 1 tablet by mouth once daily.      oxyCODONE-acetaminophen (PERCOCET) 7.5-325 mg per tablet Take 1 tablet by mouth every 8 (eight) hours as needed for Pain. 60 tablet 0    propranoloL (INDERAL) 40 MG tablet Take 1 tablet by mouth twice daily 60 tablet 5    sulfamethoxazole-trimethoprim 800-160mg (BACTRIM DS) 800-160 mg Tab Take 1 tablet by mouth 2 (two) times daily. 14 tablet 0    telmisartan (MICARDIS) 80 MG Tab TAKE 1 TABLET BY MOUTH ONCE DAILY REPLACES  LOSARTAN 90 tablet 2    venlafaxine (EFFEXOR-XR) 150 MG Cp24 Take 1 capsule (150 mg total)  by mouth once daily. 90 capsule 0    zolpidem (AMBIEN) 5 MG Tab Take 1 tablet (5 mg total) by mouth nightly as needed (sleep). 90 tablet 1      ?   SOCIAL HISTORY:?   Social History     Tobacco Use    Smoking status: Never Smoker    Smokeless tobacco: Never Used   Substance Use Topics    Alcohol use: No      ?      ?   FAMILY HISTORY:   family history includes Diabetes in her maternal grandfather, maternal grandmother, and mother; Sickle cell anemia in her daughter.   ?        Objective:      Physical Exam      ?   Vitals:    04/28/22 1047   BP: (!) 185/108   Pulse: 87   Resp: 20   Temp: 99 °F (37.2 °C)      ?   ECOG:?1   General appearance: in no acute distress.   Head, eyes, ears, nose, and throat:  Sclerae anicteric  Respiratory:  No increased work of breathing  Abdomen:  Obese  Extremities: Warm, without edema.   Neurologic: Alert and oriented. Grossly normal strength, coordination, and gait.   Skin: No rashes, ecchymoses or petechial lesion.    Psychiatric:  Anxious, tearful       ?   Laboratory:  ?   No visits with results within 1 Day(s) from this visit.   Latest known visit with results is:   Lab Visit on 04/18/2022   Component Date Value Ref Range Status    BNP 04/18/2022 22  0 - 99 pg/mL Final    Magnesium 04/18/2022 2.2  1.6 - 2.6 mg/dL Final    Vitamin B-12 04/18/2022 741  210 - 950 pg/mL Final        ?   Imaging:  ?    Results for orders placed or performed during the hospital encounter of 03/17/22 (from the past 2160 hour(s))   CT Neck Chest With Contrast (XPD)    Impression    When compared to the PET-CT from 10/05/2021, the right sub pectoral lymph node conglomerate is not significantly changed measuring up to 6 cm maximally.  Other findings as above.      Electronically signed by: Philippe Deutsch MD  Date:    03/17/2022  Time:    09:50     *Note: Due to a large number of results and/or encounters for the requested time period, some results have not been displayed. A complete set of results can  be found in Results Review.     No results found. However, due to the size of the patient record, not all encounters were searched. Please check Results Review for a complete set of results.  No results found. However, due to the size of the patient record, not all encounters were searched. Please check Results Review for a complete set of results.      Pathology:    I have reviewed the patient's medical history in detail and updated the computerized patient record.       ?   Assessment/Plan:   History of pulmonary embolism  -     CBC Auto Differential; Future; Expected date: 04/28/2022  -     Comprehensive Metabolic Panel; Future; Expected date: 04/28/2022    Malignant neoplasm of upper-outer quadrant of right breast in female, estrogen receptor positive  -     CBC Auto Differential; Future; Expected date: 04/28/2022  -     Comprehensive Metabolic Panel; Future; Expected date: 04/28/2022       1. History of pulmonary embolism    2. Malignant neoplasm of upper-outer quadrant of right breast in female, estrogen receptor positive          Plan:     History of pulmonary embolism  Likely provoked due to hypercoagulable state from breast cancer and obesity.  Continue apixaban at 5 mg b.i.d..  Advised that she will be on long-term anticoagulation given recurrent thrombosis history as well as active malignancy.  Reviewed labs from 02/28/2022 and noted no concerning cytopenia.  Noted improved renal function.    Malignant neoplasm of upper-outer quadrant of right breast in female, estrogen receptor positive  72-year-old female with stage IIA (pT2, pN0(sn), cM0, G2, ER+, [place]-, HER2-) invasive lobular carcinoma of right breast, status post lumpectomy with sentinel lymph node biopsy in 2019.  Currently on Arimidex and tolerating well.  DEXA scan ordered.  Yet to be obtained.     Unfortunately, imaging studies have shown persistently enlarged right subpectoral lymph node as well as FDG avid left posterior cervical and  supraclavicular lymph nodes.  Multiple biopsies in the past have been nondiagnostic for malignancy.  Case was presented at multi-disciplinary tumor conference with recommendation to continue Arimidex.  Patient was not satisfied with recommendation a requested for 2nd opinion.    She was evaluated by Dr. Sutton in [place] who recommended MRI of breast.    MRI was performed on 02/23/2021 and showed lymph node mass deep to the right pectoralis minor muscle measuring 6.6 x 4.7 x 2.9 cm. There were also adjacent satellite lymph nodes anteriorly to the dominant lymph node measured 1.1 x 0.8 cm and 0.6 x 0.4 cm.  Also described was a mass effect upon the right subclavian vein.  The mass does not in case or envelope the subclavian neurovascular structures.     Given the above findings, recommendation was made to excise the mass if not for diagnostic purposes for pain management.  She underwent lymph node excision on 03/01/2021, pathology returned back as lymph node with follicular hyperplasia. No evidence of metastatic carcinoma or lymphoma.    She was continued on Arimidex with plan for short interval PET scan.     Patient had a PET scan on 04/14/2021 that showed interim right axillary lymph node excision with large residual hypermetabolic right subpectoral lymph node mass.  Also noted interval development of subcentimeter mildly FDG avid left posterior cervical and supraclavicular lymph nodes. No FDG avid mediastinal or hilar nodes.No FDG avid pulmonary nodules/masses.      Had diagnostic bilateral mammography on 09/24/2021, results showed a left breast subcentimeter mass at the 9 o'clock position which is new when compared to mammography from 2019.  Will plan a short-interval mammogram in 6 months to re-assess left breast mass.There was no mammographic evidence of disease in the right breast.     Saw Dr. Sutton in [place] who recommended a repeat PET-CT scan. Results from PET-CT scan showed interval enlargement  of subpectoral mass on the right with sustained highly FDG avidity.    Me was she continues to complain of shoulder pain and breast discomfort.  Most recently CT neck chest with contrast was obtained on 03/17/2022, when compared to PET scan from October of 2021, the right subpectoral lymph node has not changed and still measures about 6 cm maximally.    Given continuing discomfort and pain associated with these enlarged lymph nodes, will plan on discussing this case again at tumor conference.  Meanwhile I recommend continued Arimidex and pain management.     Return in 4 weeks with repeat labs or sooner if needed.    History of pulmonary embolism  -     CBC Auto Differential; Future; Expected date: 04/28/2022  -     Comprehensive Metabolic Panel; Future; Expected date: 04/28/2022    Malignant neoplasm of upper-outer quadrant of right breast in female, estrogen receptor positive  -     CBC Auto Differential; Future; Expected date: 04/28/2022  -     Comprehensive Metabolic Panel; Future; Expected date: 04/28/2022        Jose Clarke MD

## 2022-04-29 ENCOUNTER — TUMOR BOARD CONFERENCE (OUTPATIENT)
Dept: HEMATOLOGY/ONCOLOGY | Facility: CLINIC | Age: 72
End: 2022-04-29
Payer: MEDICARE

## 2022-04-29 ENCOUNTER — TELEPHONE (OUTPATIENT)
Dept: HEMATOLOGY/ONCOLOGY | Facility: CLINIC | Age: 72
End: 2022-04-29
Payer: MEDICARE

## 2022-04-29 NOTE — PROGRESS NOTES
Tumor Board Documentation      Susu Luther was presented by Jose Clarke MD at our Tumor Board on 4/29/2022, which included representatives from (P) Medical Oncology, Hematology, Radiation Oncology, Surgical Oncology, Pathology, Navigation, Genetics, Radiology, Gastrointestinal, Pulmonology, Urology.    Susu currently presents as (P) a current patient with  , with history of the following treatments:  .    Additionally, we reviewed previous medical and familial history, history of present illness, and recent lab results along with all available histopathologic and imaging studies. The tumor board considered available treatment options and made the following recommendations:     Surgical Oncology consult       The following procedures/referrals were also placed: No orders of the defined types were placed in this encounter.      Clinical Trial Status: (P) Not discussed     National site-specific guidelines were discussed with respect to the case.    Tumor board is a meeting of clinicians from various specialty areas who evaluate and discuss patients for whom a multidisciplinary approach is being considered. Final determinations in the plan of care are those of the provider(s). The responsibility for follow up of recommendations given during tumor board is that of the provider.     Jose Clarke MD

## 2022-04-29 NOTE — TELEPHONE ENCOUNTER
Spoke with pt over the phone today and reviewed tumor board recommendations with her as to see surgical oncology for evaluation.  Pt prefers to see MD in [place] and together we made appt with dr Lal for 5/27/22 at 9am at the cancer center location . She and her  are in agreement with appt. She knows to call with any further concerns meanwhile.

## 2022-05-02 ENCOUNTER — PATIENT MESSAGE (OUTPATIENT)
Dept: RHEUMATOLOGY | Facility: CLINIC | Age: 72
End: 2022-05-02
Payer: MEDICARE

## 2022-05-04 NOTE — PROGRESS NOTES
New patient    Subjective:      Patient ID: Susu Luther is a 68 y.o. female.    Patient Active Problem List   Diagnosis    Dyspnea and respiratory abnormalities    NILS (obstructive sleep apnea)    Class 3 severe obesity due to excess calories with serious comorbidity and body mass index (BMI) of 45.0 to 49.9 in adult       Problem list has been reviewed.    she has been referred by Self, [facility] for evaluation and management for   Chief Complaint   Patient presents with    Shortness of Breath       Chief Complaint: Shortness of Breath      HPI:      Patients reports NYHA III dyspnea    Dyspnea Characteristics:   Disproportionate Exertional Dyspnea   Dyspnea Duration:  Subacute  Dyspnea Severity:  TAYLOR 9  Dyspnea Timing:   Paroxysmal nocturnal dyspnea  Dyspnea Contributing Factors:   Medications   Dyspnea Associated Symptoms:   Lightheadedness, tingling in fingers and perioral area       Modified Taylor Dyspnea Scale      0  Nothing at all    0.5  Very, very slight (just noticeable)    1  Very slight    2  Slight    3  Moderate    4 Somewhat severe    5 Severe      6    7  Very severe    8    9   Very, very severe (almost maximal)  10  Maximal      Patient reports difficulty breathing at rest and with exertion denies cough and wheezing. Symptoms have been going on for the past 2 months.  Patient denies recent sick contacts.   Patient does not have new pets. Patient does not have a history of asthma. Patient does not have a history of environmental allergens. Patient has  traveled recently to [place], [place], [place]. Patient does not have a history of smoking. Patient has had a previous chest x-ray. Patient has had a PPD done.  PPD was Negative      Snoring / Sleep Apnea:   Patient has observed frequent awakening, enuresis, tossing/turning, loud snoring, excessive daytime sleepiness.    Patient reports non restful' sleep.  she reports morning headache.   shereports impairment of activity during  wakefulness.  she reports day time napping ; duration 1 Hour  Browning sleepiness score was 9.  Neck circumference is 16.  she reports recent weight gain.  Mallampati score 4  Cardiovascular risk factors: hypertension  Bed time is 0100  Wake time is 1000  Sleep onset is within  1 Hour.  Sleep maintenance difficulties related to frequent night time awakening and difficulty falling asleep  Wake after sleep onset occurs four times a night.  Nocturia occurs four times a night,   Sleep aids : No  Dry mouth : Yes,   Sleep walking: No,   Sleep talking : Yes,   Sleep eating:No  Vivid Dreams : Yes,   Cataplexy : No,   Hypnogogic hallucinations:  No    COMORBIDITIES:  BP Readings from Last 3 Encounters:   11/28/18 130/62       CARDIAC RISK FACTORS:  hypertension, obesity      [place] Questionnaire (validated NILS screening questionnaire)  Positive -- Snoring/apnea  Positive -- Fatigue    Body mass index is 45.63 kg/m².  (>25 is overweight, >30 is obese)  Blood Pressure = Hypertension    (PreHTN 120-139/80-89, Stg1 140-159/90-99, Stg2 >160/>100)  [place] = Three of three NILS categories are positive (high risk is 2-3 positive categories)     Browning Sleepiness Scale   EPWORTH SLEEPINESS SCALE 11/28/2018   Sitting and reading 3   Watching TV 3   Sitting, inactive in a public place (e.g. a theatre or a meeting) 0   As a passenger in a car for an hour without a break 0   Lying down to rest in the afternoon when circumstances permit 3   Sitting and talking to someone 0   Sitting quietly after a lunch without alcohol 0   In a car, while stopped for a few minutes in traffic 0   Total score 9       Reference: Becca KRAUS. A new method for measuring daytime sleepiness: The Browning  Sleepiness Scale. Sleep 1991; 14(6):540-5.    STOP-Bang Questionnaire (validated NILS screening questionnaire)  Negative unless checked off.  [x] Snoring    [x]  Tired/Fatigued/Sleepy  [] Obstruction (apneas/choking)  [x] Pressure (HTN)  [x] BMI >35  [x] Age  >50  [] Neck >40 cm  [] Gender male   STOP-Bang = 6 (low risk 0-2,high risk 3-8)    References:   STOP Questionnaire   A Tool to Screen Patients for Obstructive Sleep Apnea: CHRISTIANO HernándezC.P.C., ALICIA Bro.B.S., Mynor Colindres M.D.,Cheri Mejia, Ph.D., ALICIA Tesfaye.B.S.,_ Andre Garzon.,_ Dylan Armas M.D., CARRIE DrummondR.C.P.C.; Anesthesiology 2008; 108:812-21 Copyright © 2008, the American Society of Anesthesiologists, Inc. [employer]yler [employer]      Neck circumference 40 cm [?NILS risk if >43cm (17in) male or >41cm (15.5 in) female]    Sleep position Supine = rare    Non-supine = frequent  Mouth breathing during sleep - possibly     .      A full  review of systems, past , family  and social histories was performed except as mentioned in the note above, these are non contributory to the main issues discussed today.     Previous Report Reviewed: none     Past Medical History: The following portions of the patient's history were reviewed and updated as appropriate:   She  has a past surgical history that includes Oophorectomy and  section.  Her family history is not on file.  She  reports that  has never smoked. She does not have any smokeless tobacco history on file. She reports that she does not drink alcohol. Her drug history is not on file.  She has a current medication list which includes the following prescription(s): amlodipine, cholecalciferol (vitamin d3), lorazepam, quetiapine, cyanocobalamin, and tramadol.  She has No Known Allergies..    Review of Systems   Constitutional: Positive for fever, weight gain (> 50 lbs), fatigue and weakness.   HENT: Negative for congestion.    Respiratory: Positive for dyspnea on extertion and somnolence.    Cardiovascular: Positive for leg swelling.   Musculoskeletal: Positive for myalgias.   Neurological: Positive for light-headedness and headaches.   All other systems reviewed and are negative.    "        Occupational History:  Retires [employer] employee.  Reports occupational exposure to asbestos. Denies occupational exposure to silica and petrochemicals.    Avocational Exposures:  none    Pet Exposures:  Dog: Denies allergy to dog dander.        Objective:     Vitals:    11/28/18 1321   BP: 130/62   Pulse: 97   Resp: 18   SpO2: 98%   Weight: (!) 148.4 kg (327 lb 2.6 oz)   Height: 5' 11" (1.803 m)     Body mass index is 45.63 kg/m².    Physical Exam   Constitutional: She is oriented to person, place, and time. She appears well-developed and well-nourished.   HENT:   Head: Normocephalic and atraumatic.   Eyes: EOM are normal. Pupils are equal, round, and reactive to light.   Neck: Normal range of motion. Neck supple.   Cardiovascular: Normal rate and regular rhythm.   Pulmonary/Chest: Effort normal and breath sounds normal.   Abdominal: Soft. Bowel sounds are normal.   Musculoskeletal: Normal range of motion.   Neurological: She is alert and oriented to person, place, and time.   Skin: Skin is warm and dry. Capillary refill takes less than 2 seconds.   Psychiatric: She has a normal mood and affect.   Nursing note and vitals reviewed.      Personal Diagnostic Review  No results found for this or any previous visit.      Assessment /Plan:     Discussed diagnosis, its evaluation, treatment and usual course. All questions answered.    Problem List Items Addressed This Visit        Other    Dyspnea and respiratory abnormalities - Primary    Current Assessment & Plan     Etiology unclear.  Multifactorial etiology suspected.  Likely contributors to etiology (checked)    [x] Pulmonary airway disease    [x]  Pulmonary parenchymal disease  [] Pulmonary vascular disease   [] Pleural disease  [] Pulmonary vasculitis  [x] Hypoventilation ( chest wall deformity, neuromuscular disease, obesity etc)  [x] Anemia  [x] Thyroid disease.  [x] Cardiac illess  [x]         Sleep disorder    EVALUATION:  []        Complete PFT with " bronchodilator.  []        Bronchial challenge with methacholine.   [x]        Stress test, pulmonary.  [x]        PULM - Arterial Blood Gases  [x]        Chest X Ray  []        CT scan of chest.   [x]        ISAC  [x]        Sedimentation rate  [x]        C-reactive protein  [x]        Anti-neutrophilic cytoplasmic antibody  [x]        2D ECHO with color flow doppler and bubble contrast.       PLAN:  Discussed diagnosis, its evaluation, treatment and usual course.  All questions answered.        Call if shortness of breath worsens, blood in sputum, change in character of cough, development of fever or chills, inability to maintain nutrition and hydration.     Re evaluate in four weeks with results.           Relevant Orders    Complete PFT with bronchodilator    PULM - Arterial Blood Gases--in addition to PFT only    Pulmonary stress test    ISAC    Anti-neutrophilic cytoplasmic antibody    C-reactive protein    Sedimentation rate    X-Ray Chest PA And Lateral    2D echo with color flow doppler and bubble contrast    TSH    Brain natriuretic peptide    NILS (obstructive sleep apnea)    Current Assessment & Plan     My recommendation at this point would be to set up a home [facility].  We have discussed weight loss and how this may improve her situation.  We have discussed behavioral modifications, as well.  After her study, she  could certainly try a CPAP.          Relevant Orders    Home Sleep Studies    Class 3 severe obesity due to excess calories with serious comorbidity and body mass index (BMI) of 45.0 to 49.9 in adult    Current Assessment & Plan     Complications associated with obesity reviewed . These include but are not limited to HTN, CAD,CHF, Respiratory disease, Insulin resistance syndrome, hyperlipidemia, atrial fibrillation, cancer  (endometrial, breast, kidney, colorectal, esophageal, renal, pancreatic and gall bladder cancer),GERD and NAFLD. Weight loss approaches  reviewd with patient. Combination modality that includes eating behaviour changes, moderate excercise, adjuncvtive pharmacological therapy reviewed.  Patient expressed and verbalized understanding.                   TIME SPENT WITH PATIENT: Time spent: 60 minutes in face to face  discussion concerning diagnosis, prognosis, review of lab and test results, benefits of treatment as well as management of disease, counseling of patient and coordination of care between various health  care providers . Greater than half the time spent was used for coordination of care and counseling of patient.     Follow-up in about 1 month (around 12/28/2018) for NILS, Morbid Obesity, Shortness of breath.   Zyclara Pregnancy And Lactation Text: This medication is Pregnancy Category C. It is unknown if this medication is excreted in breast milk.

## 2022-05-04 NOTE — TELEPHONE ENCOUNTER
No new care gaps identified.  [facility] Embedded Care Gaps. Reference number: [id number]. 5/04/2022   3:50:27 PM CDT

## 2022-05-04 NOTE — TELEPHONE ENCOUNTER
----- Message from Sameera Kramer sent at 5/4/2022  3:23 PM CDT -----  Pt is requesting a refill on butalbital-acetaminophen-caffeine -40 mg (FIORICET, ESGIC) -40 mg per tablet        [facility]   [phone](p)

## 2022-05-05 ENCOUNTER — PATIENT MESSAGE (OUTPATIENT)
Dept: HEMATOLOGY/ONCOLOGY | Facility: CLINIC | Age: 72
End: 2022-05-05
Payer: MEDICARE

## 2022-05-05 DIAGNOSIS — C50.411 MALIGNANT NEOPLASM OF UPPER-OUTER QUADRANT OF RIGHT BREAST IN FEMALE, ESTROGEN RECEPTOR POSITIVE: Chronic | ICD-10-CM

## 2022-05-05 DIAGNOSIS — Z17.0 MALIGNANT NEOPLASM OF UPPER-OUTER QUADRANT OF RIGHT BREAST IN FEMALE, ESTROGEN RECEPTOR POSITIVE: Chronic | ICD-10-CM

## 2022-05-05 RX ORDER — BUTALBITAL, ACETAMINOPHEN AND CAFFEINE 50; 325; 40 MG/1; MG/1; MG/1
1 TABLET ORAL DAILY PRN
Qty: 30 TABLET | Refills: 0 | Status: SHIPPED | OUTPATIENT
Start: 2022-05-05 | End: 2022-05-12 | Stop reason: SDUPTHER

## 2022-05-05 RX ORDER — LETROZOLE 2.5 MG/1
2.5 TABLET, FILM COATED ORAL DAILY
Qty: 30 TABLET | Refills: 0 | OUTPATIENT
Start: 2022-05-05

## 2022-05-12 RX ORDER — BUTALBITAL, ACETAMINOPHEN AND CAFFEINE 50; 325; 40 MG/1; MG/1; MG/1
1 TABLET ORAL DAILY PRN
Qty: 30 TABLET | Refills: 1 | Status: SHIPPED | OUTPATIENT
Start: 2022-05-12 | End: 2022-06-30

## 2022-05-12 NOTE — TELEPHONE ENCOUNTER
No new care gaps identified.  [facility] Embedded Care Gaps. Reference number: [id number]. 5/12/2022   8:28:05 AM BRANDYT

## 2022-05-16 ENCOUNTER — HOSPITAL ENCOUNTER (OUTPATIENT)
Dept: CARDIOLOGY | Facility: HOSPITAL | Age: 72
Discharge: HOME | End: 2022-05-16
Attending: STUDENT IN AN ORGANIZED HEALTH CARE EDUCATION/TRAINING PROGRAM
Payer: MEDICARE

## 2022-05-16 ENCOUNTER — OFFICE VISIT (OUTPATIENT)
Dept: CARDIOLOGY | Facility: CLINIC | Age: 72
End: 2022-05-16
Payer: MEDICARE

## 2022-05-16 VITALS
BODY MASS INDEX: 39.68 KG/M2 | HEIGHT: 72 IN | WEIGHT: 293 LBS | DIASTOLIC BLOOD PRESSURE: 70 MMHG | OXYGEN SATURATION: 97 % | HEART RATE: 52 BPM | SYSTOLIC BLOOD PRESSURE: 100 MMHG

## 2022-05-16 DIAGNOSIS — E78.5 HYPERLIPIDEMIA, UNSPECIFIED HYPERLIPIDEMIA TYPE: ICD-10-CM

## 2022-05-16 DIAGNOSIS — I10 HYPERTENSION, UNSPECIFIED TYPE: ICD-10-CM

## 2022-05-16 DIAGNOSIS — R06.02 SOB (SHORTNESS OF BREATH): ICD-10-CM

## 2022-05-16 DIAGNOSIS — I50.32 CHRONIC DIASTOLIC CONGESTIVE HEART FAILURE: Primary | ICD-10-CM

## 2022-05-16 DIAGNOSIS — R51.9 NONINTRACTABLE HEADACHE, UNSPECIFIED CHRONICITY PATTERN, UNSPECIFIED HEADACHE TYPE: ICD-10-CM

## 2022-05-16 DIAGNOSIS — R07.9 CHEST PAIN, UNSPECIFIED TYPE: ICD-10-CM

## 2022-05-16 DIAGNOSIS — R51.9 NONINTRACTABLE HEADACHE, UNSPECIFIED CHRONICITY PATTERN, UNSPECIFIED HEADACHE TYPE: Primary | ICD-10-CM

## 2022-05-16 DIAGNOSIS — E66.01 MORBID OBESITY WITH BMI OF 40.0-44.9, ADULT: ICD-10-CM

## 2022-05-16 DIAGNOSIS — M79.604 RIGHT LEG PAIN: ICD-10-CM

## 2022-05-16 DIAGNOSIS — R07.89 CHEST TIGHTNESS: ICD-10-CM

## 2022-05-16 DIAGNOSIS — Z86.73 HISTORY OF CVA (CEREBROVASCULAR ACCIDENT): ICD-10-CM

## 2022-05-16 DIAGNOSIS — I71.40 ABDOMINAL AORTIC ANEURYSM (AAA) WITHOUT RUPTURE: ICD-10-CM

## 2022-05-16 DIAGNOSIS — I26.99 RECURRENT PULMONARY EMBOLI: ICD-10-CM

## 2022-05-16 PROCEDURE — 4010F PR ACE/ARB THEARPY RXD/TAKEN: ICD-10-PCS | Mod: CPTII,S$GLB,, | Performed by: STUDENT IN AN ORGANIZED HEALTH CARE EDUCATION/TRAINING PROGRAM

## 2022-05-16 PROCEDURE — 3008F BODY MASS INDEX DOCD: CPT | Mod: CPTII,S$GLB,, | Performed by: STUDENT IN AN ORGANIZED HEALTH CARE EDUCATION/TRAINING PROGRAM

## 2022-05-16 PROCEDURE — 99999 PR PBB SHADOW E&M-EST. PATIENT-LVL V: ICD-10-PCS | Mod: PBBFAC,,, | Performed by: STUDENT IN AN ORGANIZED HEALTH CARE EDUCATION/TRAINING PROGRAM

## 2022-05-16 PROCEDURE — 1159F PR MEDICATION LIST DOCUMENTED IN MEDICAL RECORD: ICD-10-PCS | Mod: CPTII,S$GLB,, | Performed by: STUDENT IN AN ORGANIZED HEALTH CARE EDUCATION/TRAINING PROGRAM

## 2022-05-16 PROCEDURE — 1125F AMNT PAIN NOTED PAIN PRSNT: CPT | Mod: CPTII,S$GLB,, | Performed by: STUDENT IN AN ORGANIZED HEALTH CARE EDUCATION/TRAINING PROGRAM

## 2022-05-16 PROCEDURE — 93010 EKG 12-LEAD: ICD-10-PCS | Mod: ,,, | Performed by: INTERNAL MEDICINE

## 2022-05-16 PROCEDURE — 3288F FALL RISK ASSESSMENT DOCD: CPT | Mod: CPTII,S$GLB,, | Performed by: STUDENT IN AN ORGANIZED HEALTH CARE EDUCATION/TRAINING PROGRAM

## 2022-05-16 PROCEDURE — 3008F PR BODY MASS INDEX (BMI) DOCUMENTED: ICD-10-PCS | Mod: CPTII,S$GLB,, | Performed by: STUDENT IN AN ORGANIZED HEALTH CARE EDUCATION/TRAINING PROGRAM

## 2022-05-16 PROCEDURE — 4010F ACE/ARB THERAPY RXD/TAKEN: CPT | Mod: CPTII,S$GLB,, | Performed by: STUDENT IN AN ORGANIZED HEALTH CARE EDUCATION/TRAINING PROGRAM

## 2022-05-16 PROCEDURE — 3078F DIAST BP <80 MM HG: CPT | Mod: CPTII,S$GLB,, | Performed by: STUDENT IN AN ORGANIZED HEALTH CARE EDUCATION/TRAINING PROGRAM

## 2022-05-16 PROCEDURE — 3074F SYST BP LT 130 MM HG: CPT | Mod: CPTII,S$GLB,, | Performed by: STUDENT IN AN ORGANIZED HEALTH CARE EDUCATION/TRAINING PROGRAM

## 2022-05-16 PROCEDURE — 3078F PR MOST RECENT DIASTOLIC BLOOD PRESSURE < 80 MM HG: ICD-10-PCS | Mod: CPTII,S$GLB,, | Performed by: STUDENT IN AN ORGANIZED HEALTH CARE EDUCATION/TRAINING PROGRAM

## 2022-05-16 PROCEDURE — 99214 PR OFFICE/OUTPT VISIT, EST, LEVL IV, 30-39 MIN: ICD-10-PCS | Mod: S$GLB,,, | Performed by: STUDENT IN AN ORGANIZED HEALTH CARE EDUCATION/TRAINING PROGRAM

## 2022-05-16 PROCEDURE — 99214 OFFICE O/P EST MOD 30 MIN: CPT | Mod: S$GLB,,, | Performed by: STUDENT IN AN ORGANIZED HEALTH CARE EDUCATION/TRAINING PROGRAM

## 2022-05-16 PROCEDURE — 93005 ELECTROCARDIOGRAM TRACING: CPT

## 2022-05-16 PROCEDURE — 1125F PR PAIN SEVERITY QUANTIFIED, PAIN PRESENT: ICD-10-PCS | Mod: CPTII,S$GLB,, | Performed by: STUDENT IN AN ORGANIZED HEALTH CARE EDUCATION/TRAINING PROGRAM

## 2022-05-16 PROCEDURE — 1101F PR PT FALLS ASSESS DOC 0-1 FALLS W/OUT INJ PAST YR: ICD-10-PCS | Mod: CPTII,S$GLB,, | Performed by: STUDENT IN AN ORGANIZED HEALTH CARE EDUCATION/TRAINING PROGRAM

## 2022-05-16 PROCEDURE — 3288F PR FALLS RISK ASSESSMENT DOCUMENTED: ICD-10-PCS | Mod: CPTII,S$GLB,, | Performed by: STUDENT IN AN ORGANIZED HEALTH CARE EDUCATION/TRAINING PROGRAM

## 2022-05-16 PROCEDURE — 1159F MED LIST DOCD IN RCRD: CPT | Mod: CPTII,S$GLB,, | Performed by: STUDENT IN AN ORGANIZED HEALTH CARE EDUCATION/TRAINING PROGRAM

## 2022-05-16 PROCEDURE — 1101F PT FALLS ASSESS-DOCD LE1/YR: CPT | Mod: CPTII,S$GLB,, | Performed by: STUDENT IN AN ORGANIZED HEALTH CARE EDUCATION/TRAINING PROGRAM

## 2022-05-16 PROCEDURE — 99999 PR PBB SHADOW E&M-EST. PATIENT-LVL V: CPT | Mod: PBBFAC,,, | Performed by: STUDENT IN AN ORGANIZED HEALTH CARE EDUCATION/TRAINING PROGRAM

## 2022-05-16 PROCEDURE — 93010 ELECTROCARDIOGRAM REPORT: CPT | Mod: ,,, | Performed by: INTERNAL MEDICINE

## 2022-05-16 PROCEDURE — 3074F PR MOST RECENT SYSTOLIC BLOOD PRESSURE < 130 MM HG: ICD-10-PCS | Mod: CPTII,S$GLB,, | Performed by: STUDENT IN AN ORGANIZED HEALTH CARE EDUCATION/TRAINING PROGRAM

## 2022-05-16 NOTE — PROGRESS NOTES
Subjective:   Patient ID:  Susu Luther is a 72 y.o. female who presents for follow up of Shortness of Breath and Chest Pain    12/1/20  71 yo female, care establish. Prior cardiologist Dr ackerman   [facility] HTN, CVA (2009), Right breast CA, Lumpectomy 3/2019, h/o PE off OAC 2 yrs ago.  AAA, s/p transcutaneous patch by Dr. Bonds, obesity knee OA imbalanced walker dependent  C/o SOB after walking few steps and dizziness. Had vision issue 1 month ago due to uncontrolled HTN  No chest pain  Sleeps with 2 pillows  Decent appetites  Leg calf pain worse at night  No smoking/drinking  ekg today NSR LVH.    ECH normal EF, grade II DD, LAE and PAP 56 mmHG   Chest CTA negative for PE  BP high    A1c controlled    S/p Open subpectoral lymph node biopsy on the right on 12/02/2020 by Dr. Starks  Still right chest, under arm and shoudler supersensitive pain      Congestive Heart Failure  Associated symptoms include chest pain and shortness of breath. Pertinent negatives include no abdominal pain, claudication, near-syncope or palpitations.   Shortness of Breath  Associated symptoms include chest pain. Pertinent negatives include no abdominal pain, claudication, fever, headaches, leg swelling, neck pain, orthopnea, PND, rash, sputum production, syncope, vomiting or wheezing.   Chest Pain   Associated symptoms include shortness of breath. Pertinent negatives include no abdominal pain, back pain, claudication, cough, diaphoresis, dizziness, fever, headaches, irregular heartbeat, malaise/fatigue, nausea, near-syncope, numbness, orthopnea, palpitations, PND, sputum production, syncope, vomiting or weakness.   Her past medical history is significant for CHF.   Pertinent negatives for past medical history include no seizures.     2/28/22  Patient was admitted to [facility] for shortness of breath.  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  Started on heparin drip  in transition to oral anticoagulation, now on Eliquis.  Recurrent PE.  On Femara. Has another lump in breast on right side, recently seen on PET scan.   Due to TANNER, was told to stop hctz, telmisartan.  She has ran out of clonidine. Does not take the ASA, hydralazine, amlodipine.   Blood pressure normotensive.  Reports severe headaches.  Has been out of Fioricet.  Echocardiogram with normal EF  Reports shortness of breath, chest heaviness mostly right-sided.      3/14/22  Still having SOB due to PE.  No bleeding issues while on eliquis.  Chest pain is substernal to right sided.   Lasix doesn't seem to help.   A reports intermittent leg pain right > left.  DVT ultrasound in-hospital with no evidence of DVT.  Following Hematology-Oncology.  Patient does not want surgery if needed for possible breast cancer.  Denies syncope, fever, chills.         4/18/22  Comes in with daughter who lives in [place].  Concerned that she may oxygen issues and may need home oxygen. O2 98%  Reports LE swelling and SOB  Reports chest pain comes on with SOB, did not have chest pain prior to PE  Has not been on lasix, has been held  Reports balancing issues- can do PT once symptoms improve   Denies syncope, fever, chills.       5/16/22  Has been feeling weak last couple days  Feels chest tightness with walking, also CURIEL- feels worse than before. 97% O2 in clinic   Reports dizziness after taking medications   BP low today   Says recurrent cancer may be spreading   No bleeding on eliquis   Reports orthopnea, PND.  Not feeling well- Does not want to the hospital     Denies syncope.        EKG 5/16/22 SB, incomplete RBBB, LVH, septal infarct, qtc 422 ms   Echo 2/28/22  · The left ventricle is normal in size with concentric hypertrophy and normal systolic function.  · The estimated ejection fraction is 60%.  · Normal left ventricular diastolic function.  · Normal right ventricular size with normal right ventricular systolic function.  · Mild to  moderate tricuspid regurgitation.  · Normal central venous pressure (3 mmHg).  · The estimated PA systolic pressure is 36 mmHg.       Echo 2019  CONCLUSIONS     1 - Mild left atrial enlargement.     2 - Concentric hypertrophy.     3 - No wall motion abnormalities.     4 - Normal left ventricular systolic function (EF 60-65%).     5 - Impaired LV relaxation, elevated LAP (grade 2 diastolic dysfunction).     6 - Normal right ventricular systolic function .     7 - The estimated PA systolic pressure is 36 mmHg.     8 - Mild tricuspid regurgitation.        Past Medical History:   Diagnosis Date    Chronic diastolic congestive heart failure 2020    Encounter for blood transfusion     History of repair of aneurysm of abdominal aorta using endovascular stent graft     DR Bonds     of psychiatric care     Hypertension     Major depressive disorder, single episode, moderate with anxious distress 2020    Malignant neoplasm of upper-outer quadrant of right breast in female, estrogen receptor positive 3/14/2019    radiation    Psychiatric problem     Sleep apnea     Sleep difficulties     Stroke     no residual defect    Therapy        Past Surgical History:   Procedure Laterality Date    APPENDECTOMY      AXILLARY NODE DISSECTION Right 2020    Procedure: LYMPHADENECTOMY, AXILLARY;  Surgeon: Artemio Starks MD;  Location: [facility] OR;  Service: General;  Laterality: Right;    BIOPSY OF AXILLARY NODE Right 3/2/2021    Procedure: BIOPSY, LYMPH NODE, AXILLARY;  Surgeon: Artemio Sutton MD;  Location: [address];  Service: General;  Laterality: Right;    BREAST BIOPSY          BREAST LUMPECTOMY      2019     SECTION      x 1    OOPHORECTOMY      right     SENTINEL LYMPH NODE BIOPSY Right 3/27/2019    Procedure: BIOPSY, LYMPH NODE, SENTINEL;  Surgeon: Artemio Starks MD;  Location: [facility] OR;  Service: General;  Laterality: Right;       Social History     Tobacco Use     Smoking status: Never Smoker    Smokeless tobacco: Never Used   Substance Use Topics    Alcohol use: No    Drug use: No       Family History   Problem Relation Age of Onset    Diabetes Maternal Grandmother     Diabetes Maternal Grandfather     Diabetes Mother     Sickle cell anemia Daughter     Breast cancer Neg Hx     Colon cancer Neg Hx     Ovarian cancer Neg Hx          Review of Systems   Constitutional: Negative for decreased appetite, diaphoresis, fever, malaise/fatigue and night sweats.   HENT: Negative for nosebleeds.    Eyes: Negative for blurred vision and double vision.   Cardiovascular: Positive for chest pain and dyspnea on exertion. Negative for claudication, irregular heartbeat, leg swelling, near-syncope, orthopnea, palpitations, paroxysmal nocturnal dyspnea and syncope.   Respiratory: Positive for shortness of breath. Negative for cough, sleep disturbances due to breathing, snoring, sputum production and wheezing.    Endocrine: Negative for cold intolerance and polyuria.   Hematologic/Lymphatic: Does not bruise/bleed easily.   Skin: Negative for rash.   Musculoskeletal: Negative for back pain, falls, joint pain, joint swelling and neck pain.        Right chest breast and shoulder pain   Gastrointestinal: Negative for abdominal pain, heartburn, nausea and vomiting.   Genitourinary: Negative for dysuria, frequency and hematuria.   Neurological: Negative for difficulty with concentration, dizziness, focal weakness, headaches, light-headedness, numbness, seizures and weakness.   Psychiatric/Behavioral: Negative for depression, memory loss and substance abuse. The patient does not have insomnia.    Allergic/Immunologic: Negative for HIV exposure and hives.     Vitals:    05/16/22 1022   BP: 100/70   BP Location: Left arm   Patient Position: Sitting   BP Method: Medium (Manual)   Pulse: (!) 52   SpO2: 97%   Weight: (!) 142.9 kg (315 lb 0.6 oz)   Height: 6' (1.829 m)         Objective:   Physical  Exam  Constitutional:       Comments: Mild distress.    HENT:      Head: Normocephalic.   Eyes:      Pupils: Pupils are equal, round, and reactive to light.   Neck:      Thyroid: No thyromegaly.      Vascular: Normal carotid pulses. No carotid bruit or JVD.   Cardiovascular:      Rate and Rhythm: Normal rate and regular rhythm.  No extrasystoles are present.     Chest Wall: PMI is not displaced.      Pulses: Normal pulses.      Heart sounds: Normal heart sounds. No murmur heard.    No gallop. No S3 sounds.   Pulmonary:      Effort: No respiratory distress.      Breath sounds: Normal breath sounds. No stridor.   Abdominal:      General: Bowel sounds are normal.      Palpations: Abdomen is soft.      Tenderness: There is no abdominal tenderness. There is no rebound.   Musculoskeletal:         General: Normal range of motion.   Skin:     Findings: No rash.   Neurological:      Mental Status: She is alert and oriented to person, place, and time.   Psychiatric:         Behavior: Behavior normal.         Lab Results   Component Value Date    CHOL 229 (H) 04/18/2022    CHOL 221 (H) 10/19/2020    CHOL 231 (H) 08/25/2020     Lab Results   Component Value Date    HDL 45 04/18/2022    HDL 55 10/19/2020    HDL 49 08/25/2020     Lab Results   Component Value Date    LDLCALC 150.4 04/18/2022    LDLCALC 137.4 10/19/2020    LDLCALC 135.6 08/25/2020     Lab Results   Component Value Date    TRIG 168 (H) 04/18/2022    TRIG 143 10/19/2020    TRIG 232 (H) 08/25/2020     Lab Results   Component Value Date    CHOLHDL 19.7 (L) 04/18/2022    CHOLHDL 24.9 10/19/2020    CHOLHDL 21.2 08/25/2020       Chemistry        Component Value Date/Time     04/28/2022 1000    K 4.6 04/28/2022 1000     04/28/2022 1000    CO2 22 (L) 04/28/2022 1000    BUN 21 04/28/2022 1000    CREATININE 1.3 04/28/2022 1000     04/28/2022 1000        Component Value Date/Time    CALCIUM 9.6 04/28/2022 1000    ALKPHOS 104 04/28/2022 1000    AST 11  04/28/2022 1000    ALT 8 (L) 04/28/2022 1000    BILITOT 0.3 04/28/2022 1000    ESTGFRAFRICA 48 (A) 04/28/2022 1000    EGFRNONAA 42 (A) 04/28/2022 1000          Lab Results   Component Value Date    HGBA1C 5.9 (H) 05/18/2019     Lab Results   Component Value Date    TSH 1.460 10/18/2021     Lab Results   Component Value Date    INR 0.9 02/15/2022    INR 1.0 11/10/2020    INR 1.0 05/19/2019     Lab Results   Component Value Date    WBC 8.69 04/28/2022    HGB 10.7 (L) 04/28/2022    HCT 34.5 (L) 04/28/2022    MCV 80 (L) 04/28/2022     04/28/2022     BMP  Sodium   Date Value Ref Range Status   04/28/2022 142 136 - 145 mmol/L Final     Potassium   Date Value Ref Range Status   04/28/2022 4.6 3.5 - 5.1 mmol/L Final     Chloride   Date Value Ref Range Status   04/28/2022 109 95 - 110 mmol/L Final     CO2   Date Value Ref Range Status   04/28/2022 22 (L) 23 - 29 mmol/L Final     BUN   Date Value Ref Range Status   04/28/2022 21 8 - 23 mg/dL Final     Creatinine   Date Value Ref Range Status   04/28/2022 1.3 0.5 - 1.4 mg/dL Final     Calcium   Date Value Ref Range Status   04/28/2022 9.6 8.7 - 10.5 mg/dL Final     Anion Gap   Date Value Ref Range Status   04/28/2022 11 8 - 16 mmol/L Final     eGFR if    Date Value Ref Range Status   04/28/2022 48 (A) >60 mL/min/1.73 m^2 Final     eGFR if non    Date Value Ref Range Status   04/28/2022 42 (A) >60 mL/min/1.73 m^2 Final     Comment:     Calculation used to obtain the estimated glomerular filtration  rate (eGFR) is the CKD-EPI equation.        BNP  @LABRCNTIP(BNP,BNPTRIAGEBLO)@  @LABRCNTIP(troponini)@  CrCl cannot be calculated (Patient's most recent lab result is older than the maximum 7 days allowed.).  No results found in the last 24 hours.  No results found in the last 24 hours.  No results found in the last 24 hours.    Assessment:      1. Chronic diastolic congestive heart failure    2. History of CVA (cerebrovascular accident)    3.  Hypertension, unspecified type    4. Recurrent pulmonary emboli    5. Morbid obesity with BMI of 40.0-44.9, adult    6. Abdominal aortic aneurysm (AAA) without rupture    7. Hyperlipidemia, unspecified hyperlipidemia type    8. Right leg pain    9. Chest tightness          Plan:     Diastolic heart failure  Reports shortness of breath, most likely due to PE  Echo with normal EF, mild-mod TR  BNP wnl last visit   Continue lasix 20 daily     Right leg pain- stable  DVT ultrasound recently without evidence of DVT   Normal ABIs    Recurrent pulmonary embolus  Reports shortness of breath, chest pain- worseing  Obtain trop, BNP, d-dimer   F/u with PCP regarding evaluation for home O2  Continue OAC    Hypertension  Hypotension   Hold antihypertensives- continue only telmisartan for now     History of CVA  Denies symptoms  Currently not on aspirin as she is taking Eliquis  Continue statin    HLD   as of 4/22  Continue statin    History of right breast cancer  Recurrent cancer   Follow-up with Hematology-Oncology    AAA s/p transcutaneous patch  Stable    Morbid obesity, BMI > 40  Low-salt, low-fat diet  Exercise as tolerated      All labs and cardiac procedures reviewed.  If symptoms worsen, go to ED    Return to clinic in 2 weeks     Arlene Hobbs MD  Cardiology Staff

## 2022-05-17 ENCOUNTER — TELEPHONE (OUTPATIENT)
Dept: CARDIOLOGY | Facility: CLINIC | Age: 72
End: 2022-05-17
Payer: MEDICARE

## 2022-05-17 NOTE — TELEPHONE ENCOUNTER
Call patient  Blood work did not show heart damage nor fluid build up  The blood level showing if there is a clot is going down towards normal compared to her prior level when she was diagnosed with the recurrent pulmonary embolus recently pb     pt notified and verbalized understanding pb

## 2022-05-17 NOTE — TELEPHONE ENCOUNTER
----- Message from Quin Watts sent at 5/17/2022  9:33 AM CDT -----  Type:  Patient Returning Call    Who Called:pt  Who Left Message for Patient:Cinthia  Does the patient know what this is regarding?:return call  Would the patient rather a call back or a response via [facility]? call  Best Call Back Number: [phone]  Additional Information: #

## 2022-05-19 ENCOUNTER — PATIENT MESSAGE (OUTPATIENT)
Dept: INTERNAL MEDICINE | Facility: CLINIC | Age: 72
End: 2022-05-19

## 2022-05-19 ENCOUNTER — OFFICE VISIT (OUTPATIENT)
Dept: INTERNAL MEDICINE | Facility: CLINIC | Age: 72
End: 2022-05-19
Payer: MEDICARE

## 2022-05-19 VITALS
DIASTOLIC BLOOD PRESSURE: 62 MMHG | TEMPERATURE: 97 F | BODY MASS INDEX: 39.68 KG/M2 | HEIGHT: 72 IN | WEIGHT: 293 LBS | RESPIRATION RATE: 18 BRPM | OXYGEN SATURATION: 97 % | HEART RATE: 57 BPM | SYSTOLIC BLOOD PRESSURE: 84 MMHG

## 2022-05-19 DIAGNOSIS — R51.9 ACUTE INTRACTABLE HEADACHE, UNSPECIFIED HEADACHE TYPE: Primary | ICD-10-CM

## 2022-05-19 DIAGNOSIS — M79.604 BILATERAL LEG PAIN: ICD-10-CM

## 2022-05-19 DIAGNOSIS — M79.601 RIGHT ARM PAIN: ICD-10-CM

## 2022-05-19 DIAGNOSIS — I95.9 HYPOTENSION, UNSPECIFIED HYPOTENSION TYPE: ICD-10-CM

## 2022-05-19 DIAGNOSIS — M79.605 BILATERAL LEG PAIN: ICD-10-CM

## 2022-05-19 PROCEDURE — 3078F DIAST BP <80 MM HG: CPT | Mod: CPTII,S$GLB,, | Performed by: FAMILY MEDICINE

## 2022-05-19 PROCEDURE — 4010F ACE/ARB THERAPY RXD/TAKEN: CPT | Mod: CPTII,S$GLB,, | Performed by: FAMILY MEDICINE

## 2022-05-19 PROCEDURE — 1159F PR MEDICATION LIST DOCUMENTED IN MEDICAL RECORD: ICD-10-PCS | Mod: CPTII,S$GLB,, | Performed by: FAMILY MEDICINE

## 2022-05-19 PROCEDURE — 3074F PR MOST RECENT SYSTOLIC BLOOD PRESSURE < 130 MM HG: ICD-10-PCS | Mod: CPTII,S$GLB,, | Performed by: FAMILY MEDICINE

## 2022-05-19 PROCEDURE — 96372 THER/PROPH/DIAG INJ SC/IM: CPT | Mod: S$GLB,,, | Performed by: FAMILY MEDICINE

## 2022-05-19 PROCEDURE — 3008F PR BODY MASS INDEX (BMI) DOCUMENTED: ICD-10-PCS | Mod: CPTII,S$GLB,, | Performed by: FAMILY MEDICINE

## 2022-05-19 PROCEDURE — 1159F MED LIST DOCD IN RCRD: CPT | Mod: CPTII,S$GLB,, | Performed by: FAMILY MEDICINE

## 2022-05-19 PROCEDURE — 99213 PR OFFICE/OUTPT VISIT, EST, LEVL III, 20-29 MIN: ICD-10-PCS | Mod: 25,S$GLB,, | Performed by: FAMILY MEDICINE

## 2022-05-19 PROCEDURE — 4010F PR ACE/ARB THEARPY RXD/TAKEN: ICD-10-PCS | Mod: CPTII,S$GLB,, | Performed by: FAMILY MEDICINE

## 2022-05-19 PROCEDURE — 1125F AMNT PAIN NOTED PAIN PRSNT: CPT | Mod: CPTII,S$GLB,, | Performed by: FAMILY MEDICINE

## 2022-05-19 PROCEDURE — 3074F SYST BP LT 130 MM HG: CPT | Mod: CPTII,S$GLB,, | Performed by: FAMILY MEDICINE

## 2022-05-19 PROCEDURE — 3288F PR FALLS RISK ASSESSMENT DOCUMENTED: ICD-10-PCS | Mod: CPTII,S$GLB,, | Performed by: FAMILY MEDICINE

## 2022-05-19 PROCEDURE — 3288F FALL RISK ASSESSMENT DOCD: CPT | Mod: CPTII,S$GLB,, | Performed by: FAMILY MEDICINE

## 2022-05-19 PROCEDURE — 99999 PR PBB SHADOW E&M-EST. PATIENT-LVL V: ICD-10-PCS | Mod: PBBFAC,,, | Performed by: FAMILY MEDICINE

## 2022-05-19 PROCEDURE — 1100F PR PT FALLS ASSESS DOC 2+ FALLS/FALL W/INJURY/YR: ICD-10-PCS | Mod: CPTII,S$GLB,, | Performed by: FAMILY MEDICINE

## 2022-05-19 PROCEDURE — 1100F PTFALLS ASSESS-DOCD GE2>/YR: CPT | Mod: CPTII,S$GLB,, | Performed by: FAMILY MEDICINE

## 2022-05-19 PROCEDURE — 3078F PR MOST RECENT DIASTOLIC BLOOD PRESSURE < 80 MM HG: ICD-10-PCS | Mod: CPTII,S$GLB,, | Performed by: FAMILY MEDICINE

## 2022-05-19 PROCEDURE — 99999 PR PBB SHADOW E&M-EST. PATIENT-LVL V: CPT | Mod: PBBFAC,,, | Performed by: FAMILY MEDICINE

## 2022-05-19 PROCEDURE — 1125F PR PAIN SEVERITY QUANTIFIED, PAIN PRESENT: ICD-10-PCS | Mod: CPTII,S$GLB,, | Performed by: FAMILY MEDICINE

## 2022-05-19 PROCEDURE — 96372 PR INJECTION,THERAP/PROPH/DIAG2ST, IM OR SUBCUT: ICD-10-PCS | Mod: S$GLB,,, | Performed by: FAMILY MEDICINE

## 2022-05-19 PROCEDURE — 3008F BODY MASS INDEX DOCD: CPT | Mod: CPTII,S$GLB,, | Performed by: FAMILY MEDICINE

## 2022-05-19 PROCEDURE — 99213 OFFICE O/P EST LOW 20 MIN: CPT | Mod: 25,S$GLB,, | Performed by: FAMILY MEDICINE

## 2022-05-19 RX ORDER — OXYCODONE AND ACETAMINOPHEN 7.5; 325 MG/1; MG/1
1 TABLET ORAL EVERY 8 HOURS PRN
Qty: 60 TABLET | Refills: 0 | Status: SHIPPED | OUTPATIENT
Start: 2022-05-19 | End: 2022-07-01 | Stop reason: SDUPTHER

## 2022-05-19 RX ORDER — KETOROLAC TROMETHAMINE 30 MG/ML
30 INJECTION, SOLUTION INTRAMUSCULAR; INTRAVENOUS ONCE
Status: COMPLETED | OUTPATIENT
Start: 2022-05-19 | End: 2022-05-19

## 2022-05-19 RX ADMIN — KETOROLAC TROMETHAMINE 30 MG: 30 INJECTION, SOLUTION INTRAMUSCULAR; INTRAVENOUS at 09:05

## 2022-05-19 NOTE — PROGRESS NOTES
Susudonna Luther  72 y.o. Black or  female    Here for follow up on hypotension and arm pain.    Seen oncology recently and learned of a mass in the right breast has gotten larger     Blood pressure medication on hold right now   Saw cardiology and had labs done this week.     Currently have a headache   She has not been sleeping at night     Reports taking medications as instructed.  Not taking any OTC meds.    Past Medical History:   Diagnosis Date    Chronic diastolic congestive heart failure 8/25/2020    Encounter for blood transfusion     History of repair of aneurysm of abdominal aorta using endovascular stent graft     DR Bonds     of psychiatric care     Hypertension     Major depressive disorder, single episode, moderate with anxious distress 1/14/2020    Malignant neoplasm of upper-outer quadrant of right breast in female, estrogen receptor positive 3/14/2019    radiation    Psychiatric problem     Sleep apnea     Sleep difficulties     Stroke 2009    no residual defect    Therapy          Current Outpatient Medications:     albuterol (VENTOLIN HFA) 90 mcg/actuation inhaler, Inhale 2 puffs into the lungs every 4 (four) hours as needed for Shortness of Breath., Disp: 18 g, Rfl: 11    ALPRAZolam (XANAX) 0.5 MG tablet, Take 1 tablet (0.5 mg total) by mouth 2 (two) times daily as needed for Insomnia or Anxiety., Disp: 60 tablet, Rfl: 0    amLODIPine (NORVASC) 10 MG tablet, Take 1 tablet (10 mg total) by mouth once daily., Disp: 90 tablet, Rfl: 2    apixaban (ELIQUIS) 5 mg Tab, Take 1 tablet (5 mg total) by mouth 2 (two) times daily., Disp: 180 tablet, Rfl: 3    atorvastatin (LIPITOR) 40 MG tablet, Take 1 tablet (40 mg total) by mouth once daily., Disp: 90 tablet, Rfl: 3    butalbital-acetaminophen-caffeine -40 mg (FIORICET, ESGIC) -40 mg per tablet, Take 1 tablet by mouth daily as needed for Pain or Headaches., Disp: 30 tablet, Rfl: 1    cholecalciferol,  vitamin D3, 1,250 mcg (50,000 unit) capsule, Take 1 capsule (50,000 Units total) by mouth once a week. (Patient taking differently: Take 50,000 Units by mouth once a week. on Friday), Disp: 12 capsule, Rfl: 0    diclofenac sodium (VOLTAREN) 1 % Gel, Apply 2 grams topically once daily., Disp: 450 g, Rfl: 1    doxazosin (CARDURA) 4 MG tablet, Take 1 tablet (4 mg total) by mouth every evening., Disp: 90 tablet, Rfl: 0    furosemide (LASIX) 20 MG tablet, take 1/2 tablet by mouth once a day., Disp: 90 tablet, Rfl: 0    gabapentin (NEURONTIN) 600 MG tablet, Take 1 tablet (600 mg total) by mouth 3 (three) times daily., Disp: 270 tablet, Rfl: 0    hydrALAZINE (APRESOLINE) 100 MG tablet, once daily., Disp: , Rfl:     hydroCHLOROthiazide (HYDRODIURIL) 50 MG tablet, Take 1 tablet (50 mg total) by mouth once daily., Disp: 90 tablet, Rfl: 3    hydrOXYzine HCL (ATARAX) 25 MG tablet, Take 1 tablet (25 mg total) by mouth every evening., Disp: 60 tablet, Rfl: 0    letrozole (FEMARA) 2.5 mg Tab, Take 1 tablet by mouth once daily, Disp: 30 tablet, Rfl: 0    LIDOcaine (LIDODERM) 5 %, Place 2 patches onto the skin once daily. Remove & Discard patch within 12 hours or as directed by MD, Disp: 90 patch, Rfl: 1    multivitamin (THERAGRAN) per tablet, Take 1 tablet by mouth once daily., Disp: , Rfl:     propranoloL (INDERAL) 40 MG tablet, Take 1 tablet by mouth twice daily, Disp: 60 tablet, Rfl: 5    telmisartan (MICARDIS) 80 MG Tab, TAKE 1 TABLET BY MOUTH ONCE DAILY REPLACES  LOSARTAN, Disp: 90 tablet, Rfl: 2    venlafaxine (EFFEXOR-XR) 150 MG Cp24, Take 1 capsule (150 mg total) by mouth once daily., Disp: 90 capsule, Rfl: 0    zolpidem (AMBIEN) 5 MG Tab, Take 1 tablet (5 mg total) by mouth nightly as needed (sleep)., Disp: 90 tablet, Rfl: 1    oxyCODONE-acetaminophen (PERCOCET) 7.5-325 mg per tablet, Take 1 tablet by mouth every 8 (eight) hours as needed for Pain., Disp: 60 tablet, Rfl: 0  No current facility-administered  medications for this visit.    Review of patient's allergies indicates:   Allergen Reactions    Pcn [penicillins] Hives       ROS: Denies dizziness, headache, chest pain, shortness of breath or edema    PE:  GEN: Alert and oriented, no acute distress  HEART: Normal S1 and S2, RRR, no lower extremity edema  LUNGS: Respirations unlabored, bilaterally clear to auscultation    ASSESSMENT/PLAN:      Acute intractable headache, unspecified headache type  -     ketorolac injection 30 mg    Bilateral leg pain  -     oxyCODONE-acetaminophen (PERCOCET) 7.5-325 mg per tablet; Take 1 tablet by mouth every 8 (eight) hours as needed for Pain.  Dispense: 60 tablet; Refill: 0    Right arm pain  -     oxyCODONE-acetaminophen (PERCOCET) 7.5-325 mg per tablet; Take 1 tablet by mouth every 8 (eight) hours as needed for Pain.  Dispense: 60 tablet; Refill: 0    Hypotension, unspecified hypotension type    hold all blood pressure medication   Will consider Midodrine    Follow up as needed

## 2022-05-22 ENCOUNTER — PATIENT MESSAGE (OUTPATIENT)
Dept: INTERNAL MEDICINE | Facility: CLINIC | Age: 72
End: 2022-05-22
Payer: MEDICARE

## 2022-05-22 DIAGNOSIS — C50.411 MALIGNANT NEOPLASM OF UPPER-OUTER QUADRANT OF RIGHT BREAST IN FEMALE, ESTROGEN RECEPTOR POSITIVE: Chronic | ICD-10-CM

## 2022-05-22 DIAGNOSIS — Z17.0 MALIGNANT NEOPLASM OF UPPER-OUTER QUADRANT OF RIGHT BREAST IN FEMALE, ESTROGEN RECEPTOR POSITIVE: Chronic | ICD-10-CM

## 2022-05-22 DIAGNOSIS — F41.9 ANXIETY: ICD-10-CM

## 2022-05-23 RX ORDER — ALPRAZOLAM 0.5 MG/1
0.5 TABLET ORAL 2 TIMES DAILY PRN
Qty: 60 TABLET | Refills: 0 | Status: SHIPPED | OUTPATIENT
Start: 2022-05-23 | End: 2022-06-09 | Stop reason: SDUPTHER

## 2022-05-23 NOTE — TELEPHONE ENCOUNTER
No new care gaps identified.  [facility] Embedded Care Gaps. Reference number: [id number]. 5/23/2022   10:02:46 AM BRANDYT

## 2022-05-26 NOTE — PROGRESS NOTES
Susu has a strange problem. She has history of ER+, Her2 (-) lobular breast cancer in the right breast pT2N0 s/p lumpectomy and adjuvant RT in 2019. She has been on AI therapy since. She demonstrated R axillary jena conglomerate with mild pet activity and underwent 2 attempts at biopsy which were negative. At this time she was referred to see Dr. Issa who took her to the operating room for a large incisional biopsy of the dominant jena mass on the right side 3/2021.    Final Pathologic Diagnosis 1. Lymph node, axillary, excision:  Lymph node with follicular hyperplasia.   No evidence of metastatic carcinoma or lymphoma     This was based on an at least 2.5cm gross specimen, which certainly seems an adequate sample.    Repeat imaging surveillance has continued to show stable to slight progression in the axilla with waxing and waning supraclavicular and cervical mild adenopathy.    She continues on AI.    Symptoms include burning type sensation in the right breast, underarm, shoulder down to the elbow. There is some immobility due to pain. There is no gross deformity and no gross lymphedema. Deep palpation is limited by pain.  On gabapentin 600mg BID for a few months with little improvement.    We had a long discussion and I have discussed this case with Dr. Sutton. She has had 3 biopsies of this mass, the most recent was a >3cm wedge incisional biopsy. There is no necrosis, this is an adequate specimen. At this point, I find it somewhat unlikely this mass in under-sampled and best available evidence suggests this is not recurrent breast cancer. We discussed the risk of repeat resection to negative margin including lymphedema and nerve injury. Certainly the possibility that we would be trading one symptoms for another. She is extremely hesitant to have another operation right now, and I am inclined to maximize her neuropathic pain regimen and continue surveillance. I did discuss the potential role for  surgery in the future should this area progress radiographically.      POC:  Consider change to lyrica, will reach out to Dr. Clarke  F/u imaging, discuss in conference with Dr. Nicole    I spent 60 minutes with Ms. Luther, with >50% of time spent face to face and additional time preparing to see the patient (eg, review of tests), obtaining and/or reviewing separately obtained history, documenting clinical information in the electronic or other health record, independently interpreting results (not separately reported) and communicating results to the patient/family/caregiver, or Care coordination (not separately reported).       Melecio Lal MD  Upper GI / Hepatobiliary Surgical Oncology  [facility] [place], [place]  Office: [phone]  Fax: [phone]

## 2022-05-27 ENCOUNTER — OFFICE VISIT (OUTPATIENT)
Dept: SURGICAL ONCOLOGY | Facility: CLINIC | Age: 72
End: 2022-05-27
Payer: MEDICARE

## 2022-05-27 VITALS
BODY MASS INDEX: 42.73 KG/M2 | DIASTOLIC BLOOD PRESSURE: 93 MMHG | HEART RATE: 66 BPM | TEMPERATURE: 97 F | WEIGHT: 293 LBS | SYSTOLIC BLOOD PRESSURE: 168 MMHG

## 2022-05-27 DIAGNOSIS — Z17.0 MALIGNANT NEOPLASM OF UPPER-OUTER QUADRANT OF RIGHT BREAST IN FEMALE, ESTROGEN RECEPTOR POSITIVE: Primary | ICD-10-CM

## 2022-05-27 DIAGNOSIS — C50.411 MALIGNANT NEOPLASM OF UPPER-OUTER QUADRANT OF RIGHT BREAST IN FEMALE, ESTROGEN RECEPTOR POSITIVE: Primary | ICD-10-CM

## 2022-05-27 DIAGNOSIS — R59.1 LYMPHADENOPATHY: ICD-10-CM

## 2022-05-27 PROCEDURE — 99499 UNLISTED E&M SERVICE: CPT | Mod: S$GLB,,, | Performed by: SURGERY

## 2022-05-27 PROCEDURE — 1159F PR MEDICATION LIST DOCUMENTED IN MEDICAL RECORD: ICD-10-PCS | Mod: CPTII,S$GLB,, | Performed by: SURGERY

## 2022-05-27 PROCEDURE — 3008F BODY MASS INDEX DOCD: CPT | Mod: CPTII,S$GLB,, | Performed by: SURGERY

## 2022-05-27 PROCEDURE — 3080F PR MOST RECENT DIASTOLIC BLOOD PRESSURE >= 90 MM HG: ICD-10-PCS | Mod: CPTII,S$GLB,, | Performed by: SURGERY

## 2022-05-27 PROCEDURE — 99205 OFFICE O/P NEW HI 60 MIN: CPT | Mod: S$GLB,,, | Performed by: SURGERY

## 2022-05-27 PROCEDURE — 99205 PR OFFICE/OUTPT VISIT, NEW, LEVL V, 60-74 MIN: ICD-10-PCS | Mod: S$GLB,,, | Performed by: SURGERY

## 2022-05-27 PROCEDURE — 3077F PR MOST RECENT SYSTOLIC BLOOD PRESSURE >= 140 MM HG: ICD-10-PCS | Mod: CPTII,S$GLB,, | Performed by: SURGERY

## 2022-05-27 PROCEDURE — 4010F ACE/ARB THERAPY RXD/TAKEN: CPT | Mod: CPTII,S$GLB,, | Performed by: SURGERY

## 2022-05-27 PROCEDURE — 1160F RVW MEDS BY RX/DR IN RCRD: CPT | Mod: CPTII,S$GLB,, | Performed by: SURGERY

## 2022-05-27 PROCEDURE — 3008F PR BODY MASS INDEX (BMI) DOCUMENTED: ICD-10-PCS | Mod: CPTII,S$GLB,, | Performed by: SURGERY

## 2022-05-27 PROCEDURE — 4010F PR ACE/ARB THEARPY RXD/TAKEN: ICD-10-PCS | Mod: CPTII,S$GLB,, | Performed by: SURGERY

## 2022-05-27 PROCEDURE — 99999 PR PBB SHADOW E&M-EST. PATIENT-LVL IV: CPT | Mod: PBBFAC,,, | Performed by: SURGERY

## 2022-05-27 PROCEDURE — 1160F PR REVIEW ALL MEDS BY PRESCRIBER/CLIN PHARMACIST DOCUMENTED: ICD-10-PCS | Mod: CPTII,S$GLB,, | Performed by: SURGERY

## 2022-05-27 PROCEDURE — 3077F SYST BP >= 140 MM HG: CPT | Mod: CPTII,S$GLB,, | Performed by: SURGERY

## 2022-05-27 PROCEDURE — 1159F MED LIST DOCD IN RCRD: CPT | Mod: CPTII,S$GLB,, | Performed by: SURGERY

## 2022-05-27 PROCEDURE — 99499 RISK ADDL DX/OHS AUDIT: ICD-10-PCS | Mod: S$GLB,,, | Performed by: SURGERY

## 2022-05-27 PROCEDURE — 1125F AMNT PAIN NOTED PAIN PRSNT: CPT | Mod: CPTII,S$GLB,, | Performed by: SURGERY

## 2022-05-27 PROCEDURE — 1125F PR PAIN SEVERITY QUANTIFIED, PAIN PRESENT: ICD-10-PCS | Mod: CPTII,S$GLB,, | Performed by: SURGERY

## 2022-05-27 PROCEDURE — 3080F DIAST BP >= 90 MM HG: CPT | Mod: CPTII,S$GLB,, | Performed by: SURGERY

## 2022-05-27 PROCEDURE — 99999 PR PBB SHADOW E&M-EST. PATIENT-LVL IV: ICD-10-PCS | Mod: PBBFAC,,, | Performed by: SURGERY

## 2022-05-30 ENCOUNTER — TELEPHONE (OUTPATIENT)
Dept: ADMINISTRATIVE | Facility: HOSPITAL | Age: 72
End: 2022-05-30
Payer: MEDICARE

## 2022-06-01 ENCOUNTER — PATIENT MESSAGE (OUTPATIENT)
Dept: CARDIOLOGY | Facility: CLINIC | Age: 72
End: 2022-06-01

## 2022-06-06 ENCOUNTER — OFFICE VISIT (OUTPATIENT)
Dept: DERMATOLOGY | Facility: CLINIC | Age: 72
End: 2022-06-06
Payer: MEDICARE

## 2022-06-06 ENCOUNTER — PATIENT MESSAGE (OUTPATIENT)
Dept: INTERNAL MEDICINE | Facility: CLINIC | Age: 72
End: 2022-06-06
Payer: MEDICARE

## 2022-06-06 ENCOUNTER — PATIENT MESSAGE (OUTPATIENT)
Dept: OPHTHALMOLOGY | Facility: CLINIC | Age: 72
End: 2022-06-06
Payer: MEDICARE

## 2022-06-06 ENCOUNTER — OFFICE VISIT (OUTPATIENT)
Dept: HEMATOLOGY/ONCOLOGY | Facility: CLINIC | Age: 72
End: 2022-06-06
Payer: MEDICARE

## 2022-06-06 ENCOUNTER — LAB VISIT (OUTPATIENT)
Dept: LAB | Facility: HOSPITAL | Age: 72
End: 2022-06-06
Attending: INTERNAL MEDICINE
Payer: MEDICARE

## 2022-06-06 VITALS
WEIGHT: 293 LBS | BODY MASS INDEX: 43.4 KG/M2 | SYSTOLIC BLOOD PRESSURE: 114 MMHG | DIASTOLIC BLOOD PRESSURE: 76 MMHG | TEMPERATURE: 97 F | HEART RATE: 51 BPM | HEIGHT: 69 IN

## 2022-06-06 DIAGNOSIS — C50.411 MALIGNANT NEOPLASM OF UPPER-OUTER QUADRANT OF RIGHT BREAST IN FEMALE, ESTROGEN RECEPTOR POSITIVE: Chronic | ICD-10-CM

## 2022-06-06 DIAGNOSIS — Z86.711 HISTORY OF PULMONARY EMBOLISM: ICD-10-CM

## 2022-06-06 DIAGNOSIS — E66.01 MORBID OBESITY WITH BMI OF 45.0-49.9, ADULT: ICD-10-CM

## 2022-06-06 DIAGNOSIS — G62.9 NEUROPATHY: Primary | ICD-10-CM

## 2022-06-06 DIAGNOSIS — L30.9 DERMATITIS: Primary | ICD-10-CM

## 2022-06-06 DIAGNOSIS — Z17.0 MALIGNANT NEOPLASM OF UPPER-OUTER QUADRANT OF RIGHT BREAST IN FEMALE, ESTROGEN RECEPTOR POSITIVE: Chronic | ICD-10-CM

## 2022-06-06 LAB
ALBUMIN SERPL BCP-MCNC: 3.3 G/DL (ref 3.5–5.2)
ALP SERPL-CCNC: 130 U/L (ref 55–135)
ALT SERPL W/O P-5'-P-CCNC: 10 U/L (ref 10–44)
ANION GAP SERPL CALC-SCNC: 9 MMOL/L (ref 8–16)
AST SERPL-CCNC: 11 U/L (ref 10–40)
BASOPHILS # BLD AUTO: 0.08 K/UL (ref 0–0.2)
BASOPHILS NFR BLD: 0.9 % (ref 0–1.9)
BILIRUB SERPL-MCNC: 0.3 MG/DL (ref 0.1–1)
BUN SERPL-MCNC: 18 MG/DL (ref 8–23)
CALCIUM SERPL-MCNC: 9.4 MG/DL (ref 8.7–10.5)
CHLORIDE SERPL-SCNC: 107 MMOL/L (ref 95–110)
CO2 SERPL-SCNC: 24 MMOL/L (ref 23–29)
CREAT SERPL-MCNC: 1.5 MG/DL (ref 0.5–1.4)
DIFFERENTIAL METHOD: ABNORMAL
EOSINOPHIL # BLD AUTO: 0.1 K/UL (ref 0–0.5)
EOSINOPHIL NFR BLD: 1.4 % (ref 0–8)
ERYTHROCYTE [DISTWIDTH] IN BLOOD BY AUTOMATED COUNT: 16 % (ref 11.5–14.5)
EST. GFR  (AFRICAN AMERICAN): 40 ML/MIN/1.73 M^2
EST. GFR  (NON AFRICAN AMERICAN): 35 ML/MIN/1.73 M^2
GLUCOSE SERPL-MCNC: 117 MG/DL (ref 70–110)
HCT VFR BLD AUTO: 38.2 % (ref 37–48.5)
HGB BLD-MCNC: 11.6 G/DL (ref 12–16)
IMM GRANULOCYTES # BLD AUTO: 0.03 K/UL (ref 0–0.04)
IMM GRANULOCYTES NFR BLD AUTO: 0.3 % (ref 0–0.5)
LYMPHOCYTES # BLD AUTO: 2.4 K/UL (ref 1–4.8)
LYMPHOCYTES NFR BLD: 26.3 % (ref 18–48)
MCH RBC QN AUTO: 24.7 PG (ref 27–31)
MCHC RBC AUTO-ENTMCNC: 30.4 G/DL (ref 32–36)
MCV RBC AUTO: 81 FL (ref 82–98)
MONOCYTES # BLD AUTO: 0.7 K/UL (ref 0.3–1)
MONOCYTES NFR BLD: 7.7 % (ref 4–15)
NEUTROPHILS # BLD AUTO: 5.8 K/UL (ref 1.8–7.7)
NEUTROPHILS NFR BLD: 63.4 % (ref 38–73)
NRBC BLD-RTO: 0 /100 WBC
PLATELET # BLD AUTO: 293 K/UL (ref 150–450)
PMV BLD AUTO: 9.5 FL (ref 9.2–12.9)
POTASSIUM SERPL-SCNC: 4.5 MMOL/L (ref 3.5–5.1)
PROT SERPL-MCNC: 7 G/DL (ref 6–8.4)
RBC # BLD AUTO: 4.69 M/UL (ref 4–5.4)
SODIUM SERPL-SCNC: 140 MMOL/L (ref 136–145)
WBC # BLD AUTO: 9.12 K/UL (ref 3.9–12.7)

## 2022-06-06 PROCEDURE — 4010F ACE/ARB THERAPY RXD/TAKEN: CPT | Mod: CPTII,S$GLB,, | Performed by: INTERNAL MEDICINE

## 2022-06-06 PROCEDURE — 1101F PR PT FALLS ASSESS DOC 0-1 FALLS W/OUT INJ PAST YR: ICD-10-PCS | Mod: CPTII,S$GLB,, | Performed by: INTERNAL MEDICINE

## 2022-06-06 PROCEDURE — 4010F PR ACE/ARB THEARPY RXD/TAKEN: ICD-10-PCS | Mod: CPTII,S$GLB,, | Performed by: INTERNAL MEDICINE

## 2022-06-06 PROCEDURE — 80053 COMPREHEN METABOLIC PANEL: CPT | Performed by: INTERNAL MEDICINE

## 2022-06-06 PROCEDURE — 3288F FALL RISK ASSESSMENT DOCD: CPT | Mod: CPTII,S$GLB,, | Performed by: INTERNAL MEDICINE

## 2022-06-06 PROCEDURE — 3288F PR FALLS RISK ASSESSMENT DOCUMENTED: ICD-10-PCS | Mod: CPTII,S$GLB,, | Performed by: INTERNAL MEDICINE

## 2022-06-06 PROCEDURE — 3008F BODY MASS INDEX DOCD: CPT | Mod: CPTII,S$GLB,, | Performed by: INTERNAL MEDICINE

## 2022-06-06 PROCEDURE — 3078F PR MOST RECENT DIASTOLIC BLOOD PRESSURE < 80 MM HG: ICD-10-PCS | Mod: CPTII,S$GLB,, | Performed by: INTERNAL MEDICINE

## 2022-06-06 PROCEDURE — 99499 RISK ADDL DX/OHS AUDIT: ICD-10-PCS | Mod: S$GLB,,, | Performed by: INTERNAL MEDICINE

## 2022-06-06 PROCEDURE — 1125F AMNT PAIN NOTED PAIN PRSNT: CPT | Mod: CPTII,S$GLB,, | Performed by: INTERNAL MEDICINE

## 2022-06-06 PROCEDURE — 1101F PT FALLS ASSESS-DOCD LE1/YR: CPT | Mod: CPTII,S$GLB,, | Performed by: INTERNAL MEDICINE

## 2022-06-06 PROCEDURE — 99203 PR OFFICE/OUTPT VISIT, NEW, LEVL III, 30-44 MIN: ICD-10-PCS | Mod: S$GLB,,, | Performed by: STUDENT IN AN ORGANIZED HEALTH CARE EDUCATION/TRAINING PROGRAM

## 2022-06-06 PROCEDURE — 99999 PR PBB SHADOW E&M-EST. PATIENT-LVL III: CPT | Mod: PBBFAC,,, | Performed by: STUDENT IN AN ORGANIZED HEALTH CARE EDUCATION/TRAINING PROGRAM

## 2022-06-06 PROCEDURE — 3074F PR MOST RECENT SYSTOLIC BLOOD PRESSURE < 130 MM HG: ICD-10-PCS | Mod: CPTII,S$GLB,, | Performed by: INTERNAL MEDICINE

## 2022-06-06 PROCEDURE — 36415 COLL VENOUS BLD VENIPUNCTURE: CPT | Performed by: INTERNAL MEDICINE

## 2022-06-06 PROCEDURE — 99999 PR PBB SHADOW E&M-EST. PATIENT-LVL IV: CPT | Mod: PBBFAC,,, | Performed by: INTERNAL MEDICINE

## 2022-06-06 PROCEDURE — 99999 PR PBB SHADOW E&M-EST. PATIENT-LVL III: ICD-10-PCS | Mod: PBBFAC,,, | Performed by: STUDENT IN AN ORGANIZED HEALTH CARE EDUCATION/TRAINING PROGRAM

## 2022-06-06 PROCEDURE — 4010F PR ACE/ARB THEARPY RXD/TAKEN: ICD-10-PCS | Mod: ,,, | Performed by: STUDENT IN AN ORGANIZED HEALTH CARE EDUCATION/TRAINING PROGRAM

## 2022-06-06 PROCEDURE — 3008F PR BODY MASS INDEX (BMI) DOCUMENTED: ICD-10-PCS | Mod: CPTII,S$GLB,, | Performed by: INTERNAL MEDICINE

## 2022-06-06 PROCEDURE — 99213 OFFICE O/P EST LOW 20 MIN: CPT | Mod: PBBFAC,PO | Performed by: STUDENT IN AN ORGANIZED HEALTH CARE EDUCATION/TRAINING PROGRAM

## 2022-06-06 PROCEDURE — 1125F PR PAIN SEVERITY QUANTIFIED, PAIN PRESENT: ICD-10-PCS | Mod: CPTII,S$GLB,, | Performed by: INTERNAL MEDICINE

## 2022-06-06 PROCEDURE — 1159F MED LIST DOCD IN RCRD: CPT | Mod: CPTII,S$GLB,, | Performed by: INTERNAL MEDICINE

## 2022-06-06 PROCEDURE — 3078F DIAST BP <80 MM HG: CPT | Mod: CPTII,S$GLB,, | Performed by: INTERNAL MEDICINE

## 2022-06-06 PROCEDURE — 99999 PR PBB SHADOW E&M-EST. PATIENT-LVL IV: ICD-10-PCS | Mod: PBBFAC,,, | Performed by: INTERNAL MEDICINE

## 2022-06-06 PROCEDURE — 3074F SYST BP LT 130 MM HG: CPT | Mod: CPTII,S$GLB,, | Performed by: INTERNAL MEDICINE

## 2022-06-06 PROCEDURE — 4010F ACE/ARB THERAPY RXD/TAKEN: CPT | Mod: ,,, | Performed by: STUDENT IN AN ORGANIZED HEALTH CARE EDUCATION/TRAINING PROGRAM

## 2022-06-06 PROCEDURE — 99203 OFFICE O/P NEW LOW 30 MIN: CPT | Mod: S$GLB,,, | Performed by: STUDENT IN AN ORGANIZED HEALTH CARE EDUCATION/TRAINING PROGRAM

## 2022-06-06 PROCEDURE — 99215 OFFICE O/P EST HI 40 MIN: CPT | Mod: S$GLB,,, | Performed by: INTERNAL MEDICINE

## 2022-06-06 PROCEDURE — 85025 COMPLETE CBC W/AUTO DIFF WBC: CPT | Performed by: INTERNAL MEDICINE

## 2022-06-06 PROCEDURE — 1159F PR MEDICATION LIST DOCUMENTED IN MEDICAL RECORD: ICD-10-PCS | Mod: CPTII,S$GLB,, | Performed by: INTERNAL MEDICINE

## 2022-06-06 PROCEDURE — 99499 UNLISTED E&M SERVICE: CPT | Mod: S$GLB,,, | Performed by: INTERNAL MEDICINE

## 2022-06-06 PROCEDURE — 99215 PR OFFICE/OUTPT VISIT, EST, LEVL V, 40-54 MIN: ICD-10-PCS | Mod: S$GLB,,, | Performed by: INTERNAL MEDICINE

## 2022-06-06 RX ORDER — PREGABALIN 75 MG/1
75 CAPSULE ORAL 3 TIMES DAILY
Qty: 90 CAPSULE | Refills: 2 | Status: SHIPPED | OUTPATIENT
Start: 2022-06-06 | End: 2022-07-29 | Stop reason: SDUPTHER

## 2022-06-06 NOTE — PROGRESS NOTES
Subjective:      DATE OF VISIT: 6/6/22     ?  Patient ID:?Susu Luther is a 72 y.o. female.?? MR#: [id number]   ?   PRIMARY ONCOLOGIST: Dr. Huynh --> Dr. Boles    ? Primary Care Providers:  Peace Arias MD, MD (General)     CHIEF COMPLAINT: ?  Follow-up, discussion of multidisciplinary tumor board consensus recommendation?? ?   ?   ONCOLOGIC DIAGNOSIS:  Stage IIA, pT2 pN0 cM0 grade 2 ER positive, CT negative, HER2 negative right breast invasive lobular carcinoma   ?   CURRENT TREATMENT:  Anastrozole    PAST TREATMENT:  Lumpectomy and sentinel lymph node biopsy, right, Dr. Starks, 02/28/2019  ?   ONCOLOGIC HISTORY:   ?   Oncology History   Malignant neoplasm of upper-outer quadrant of right breast in female, estrogen receptor positive   3/14/2019 Initial Diagnosis    Malignant neoplasm of upper-outer quadrant of right breast in female, estrogen receptor positive     3/27/2019 Cancer Staged    Staging form: Breast, AJCC 8th Edition  - Pathologic stage from 3/27/2019: Stage IIA (pT2, pN0(sn), cM0, G2, ER+, CT-, HER2-) - Signed by Guido Huynh MD on 4/4/2019 5/20/2019 - 6/18/2019 Radiation Therapy    Treatment Site Ref. ID Energy Dose/Fx (Gy) #Fx Dose Correction (Gy) Total Dose (Gy) Start Date End Date Elapsed Days   Boost Boost 18X 2.5 4 / 4 0 10 6/13/2019 6/18/2019 5   RtBreastAddMV Rt Breast 18X/6X 2.66 16 / 16 0 42.56 5/20/2019 6/12/2019 23            Cancer Staging  Malignant neoplasm of upper-outer quadrant of right breast in female, estrogen receptor positive  - Pathologic stage from 3/27/2019: Stage IIA (pT2, pN0(sn), cM0, G2, ER+, CT-, HER2-) - Signed by Guido Huynh MD on 4/4/2019        HPI    I had the pleasure of meeting Ms. Luther who is a 72-year-old obese  female with stage IIA (pT2, pN0(sn), cM0, G2, ER+, CT-, HER2-) invasive lobular carcinoma of right breast, status post lumpectomy with sentinel lymph node biopsy in 2019.  She has since been on aromatase inhibitor  and has been tolerating well.    Restaging PET-CT scan performed on 11/04/2020 revealed large hypermetabolic right subpectoral lymph node mass measuring 6.9 x 3.2 cm with SUV max of 13.0.    Right chest wall lymph node biopsy performed on 11/10/2020 revealed fragments of benign lymph node with no evidence of malignancy.  Excisional biopsy of right subpectoral lymph node was again performed on 12/02/2020 and returned as benign with no evidence of metastatic disease.    Case was discussed at the tumor conference and recommendation was to continue aromatase inhibitor with plan for repeat short interval imaging to reassess the right subpectoral lymph node mass.  This was discussed with patient however she was not satisfied with the recommendation and wanted a 2nd opinion with a different oncologist.    During previous visit with me, we decided to refer to breast surgeon in [facility]. She had MRI and has a planned surgical resection on 3/1/21.   Status post  Lymph node excision on 03/01/2021.       Interval Hx: 73 yo female with Stage IIA, pT2 pN0 cM0 grade 2 ER positive, NC negative, HER2 negative right breast invasive lobular carcinoma, s/p lumpectomy and sentinel LN sampling, currently on endocrine therapy. Presents today for routine follow up. Had recent PE, currently on eliquis.     She c/o left breast lump, had mammogram and US breast on 8/24/21 which showed a subcentimeter left breast mass. Denies shortness of breath, chest pain, fever, chills,diarrhea or dysuria. Continues to complain bilateral shoulder pain.     Review of Systems    ?   A comprehensive 14-point review of systems was reviewed with patient and was negative other than as specified above.   ?   PAST MEDICAL HISTORY:   Past Medical History:   Diagnosis Date    Chronic diastolic congestive heart failure 8/25/2020    Encounter for blood transfusion     History of repair of aneurysm of abdominal aorta using endovascular stent graft     DR Bonds    Hx of  psychiatric care     Hypertension     Major depressive disorder, single episode, moderate with anxious distress 2020    Malignant neoplasm of upper-outer quadrant of right breast in female, estrogen receptor positive 3/14/2019    radiation    Psychiatric problem     Sleep apnea     Sleep difficulties     Stroke 2009    no residual defect    Therapy     ?     PAST SURGICAL HISTORY:   Past Surgical History:   Procedure Laterality Date    APPENDECTOMY      AXILLARY NODE DISSECTION Right 2020    Procedure: LYMPHADENECTOMY, AXILLARY;  Surgeon: Artemio Starks MD;  Location: [facility] OR;  Service: General;  Laterality: Right;    BIOPSY OF AXILLARY NODE Right 3/2/2021    Procedure: BIOPSY, LYMPH NODE, AXILLARY;  Surgeon: Artemio Sutton MD;  Location: [address];  Service: General;  Laterality: Right;    BREAST BIOPSY      2019    BREAST LUMPECTOMY      2019     SECTION      x 1    OOPHORECTOMY      right     SENTINEL LYMPH NODE BIOPSY Right 3/27/2019    Procedure: BIOPSY, LYMPH NODE, SENTINEL;  Surgeon: Artemio Starks MD;  Location: [facility] OR;  Service: General;  Laterality: Right;      ?   ALLERGIES:   Allergies as of 2022 - Reviewed 2022   Allergen Reaction Noted    Pcn [penicillins] Hives 2019      ?   MEDICATIONS:?   Outpatient Medications Marked as Taking for the 22 encounter (Office Visit) with Jose Clarke MD   Medication Sig Dispense Refill    albuterol (VENTOLIN HFA) 90 mcg/actuation inhaler Inhale 2 puffs into the lungs every 4 (four) hours as needed for Shortness of Breath. 18 g 11    ALPRAZolam (XANAX) 0.5 MG tablet Take 1 tablet (0.5 mg total) by mouth 2 (two) times daily as needed for Insomnia or Anxiety. 60 tablet 0    amLODIPine (NORVASC) 10 MG tablet Take 1 tablet (10 mg total) by mouth once daily. 90 tablet 2    apixaban (ELIQUIS) 5 mg Tab Take 1 tablet (5 mg total) by mouth 2 (two) times daily. 180 tablet 3    atorvastatin  (LIPITOR) 40 MG tablet Take 1 tablet (40 mg total) by mouth once daily. 90 tablet 3    butalbital-acetaminophen-caffeine -40 mg (FIORICET, ESGIC) -40 mg per tablet Take 1 tablet by mouth daily as needed for Pain or Headaches. 30 tablet 1    cholecalciferol, vitamin D3, 1,250 mcg (50,000 unit) capsule Take 1 capsule (50,000 Units total) by mouth once a week. (Patient taking differently: Take 50,000 Units by mouth once a week. on Friday) 12 capsule 0    diclofenac sodium (VOLTAREN) 1 % Gel Apply 2 grams topically once daily. 450 g 1    doxazosin (CARDURA) 4 MG tablet Take 1 tablet (4 mg total) by mouth every evening. 90 tablet 0    furosemide (LASIX) 20 MG tablet take 1/2 tablet by mouth once a day. 90 tablet 0    hydrALAZINE (APRESOLINE) 100 MG tablet once daily.      hydroCHLOROthiazide (HYDRODIURIL) 50 MG tablet Take 1 tablet (50 mg total) by mouth once daily. 90 tablet 3    hydrOXYzine HCL (ATARAX) 25 MG tablet Take 1 tablet (25 mg total) by mouth every evening. 60 tablet 0    letrozole (FEMARA) 2.5 mg Tab Take 1 tablet by mouth once daily 30 tablet 0    LIDOcaine (LIDODERM) 5 % Place 2 patches onto the skin once daily. Remove & Discard patch within 12 hours or as directed by MD Conner patch 1    multivitamin (THERAGRAN) per tablet Take 1 tablet by mouth once daily.      oxyCODONE-acetaminophen (PERCOCET) 7.5-325 mg per tablet Take 1 tablet by mouth every 8 (eight) hours as needed for Pain. 60 tablet 0    pregabalin (LYRICA) 75 MG capsule Take 1 capsule (75 mg total) by mouth 3 (three) times daily. 90 capsule 2    propranoloL (INDERAL) 40 MG tablet Take 1 tablet by mouth twice daily 60 tablet 5    telmisartan (MICARDIS) 80 MG Tab TAKE 1 TABLET BY MOUTH ONCE DAILY REPLACES  LOSARTAN 90 tablet 2    venlafaxine (EFFEXOR-XR) 150 MG Cp24 Take 1 capsule (150 mg total) by mouth once daily. 90 capsule 0    zolpidem (AMBIEN) 5 MG Tab Take 1 tablet (5 mg total) by mouth nightly as needed (sleep). 90  tablet 1    [DISCONTINUED] gabapentin (NEURONTIN) 600 MG tablet Take 1 tablet (600 mg total) by mouth 3 (three) times daily. 270 tablet 0      ?   SOCIAL HISTORY:?   Social History     Tobacco Use    Smoking status: Never Smoker    Smokeless tobacco: Never Used   Substance Use Topics    Alcohol use: No      ?      ?   FAMILY HISTORY:   family history includes Diabetes in her maternal grandfather, maternal grandmother, and mother; Sickle cell anemia in her daughter.   ?        Objective:      Physical Exam      ?   Vitals:    06/06/22 1220   BP: 114/76   Pulse: (!) 51   Temp: 97 °F (36.1 °C)      ?   ECOG:?1   General appearance: in no acute distress.   Head, eyes, ears, nose, and throat:  Sclerae anicteric  Respiratory:  No increased work of breathing  Abdomen:  Obese  Extremities: Warm, without edema.   Neurologic: Alert and oriented. Grossly normal strength, coordination, and gait.   Skin: No rashes, ecchymoses or petechial lesion.    Psychiatric:  Anxious, tearful       ?   Laboratory:  ?   Lab Visit on 06/06/2022   Component Date Value Ref Range Status    WBC 06/06/2022 9.12  3.90 - 12.70 K/uL Final    RBC 06/06/2022 4.69  4.00 - 5.40 M/uL Final    Hemoglobin 06/06/2022 11.6 (A) 12.0 - 16.0 g/dL Final    Hematocrit 06/06/2022 38.2  37.0 - 48.5 % Final    MCV 06/06/2022 81 (A) 82 - 98 fL Final    MCH 06/06/2022 24.7 (A) 27.0 - 31.0 pg Final    MCHC 06/06/2022 30.4 (A) 32.0 - 36.0 g/dL Final    RDW 06/06/2022 16.0 (A) 11.5 - 14.5 % Final    Platelets 06/06/2022 293  150 - 450 K/uL Final    MPV 06/06/2022 9.5  9.2 - 12.9 fL Final    Immature Granulocytes 06/06/2022 0.3  0.0 - 0.5 % Final    Gran # (ANC) 06/06/2022 5.8  1.8 - 7.7 K/uL Final    Immature Grans (Abs) 06/06/2022 0.03  0.00 - 0.04 K/uL Final    Lymph # 06/06/2022 2.4  1.0 - 4.8 K/uL Final    Mono # 06/06/2022 0.7  0.3 - 1.0 K/uL Final    Eos # 06/06/2022 0.1  0.0 - 0.5 K/uL Final    Baso # 06/06/2022 0.08  0.00 - 0.20 K/uL Final     nRBC 06/06/2022 0  0 /100 WBC Final    Gran % 06/06/2022 63.4  38.0 - 73.0 % Final    Lymph % 06/06/2022 26.3  18.0 - 48.0 % Final    Mono % 06/06/2022 7.7  4.0 - 15.0 % Final    Eosinophil % 06/06/2022 1.4  0.0 - 8.0 % Final    Basophil % 06/06/2022 0.9  0.0 - 1.9 % Final    Differential Method 06/06/2022 Automated   Final    Sodium 06/06/2022 140  136 - 145 mmol/L Final    Potassium 06/06/2022 4.5  3.5 - 5.1 mmol/L Final    Chloride 06/06/2022 107  95 - 110 mmol/L Final    CO2 06/06/2022 24  23 - 29 mmol/L Final    Glucose 06/06/2022 117 (A) 70 - 110 mg/dL Final    BUN 06/06/2022 18  8 - 23 mg/dL Final    Creatinine 06/06/2022 1.5 (A) 0.5 - 1.4 mg/dL Final    Calcium 06/06/2022 9.4  8.7 - 10.5 mg/dL Final    Total Protein 06/06/2022 7.0  6.0 - 8.4 g/dL Final    Albumin 06/06/2022 3.3 (A) 3.5 - 5.2 g/dL Final    Total Bilirubin 06/06/2022 0.3  0.1 - 1.0 mg/dL Final    Alkaline Phosphatase 06/06/2022 130  55 - 135 U/L Final    AST 06/06/2022 11  10 - 40 U/L Final    ALT 06/06/2022 10  10 - 44 U/L Final    Anion Gap 06/06/2022 9  8 - 16 mmol/L Final    eGFR if  06/06/2022 40 (A) >60 mL/min/1.73 m^2 Final    eGFR if non African American 06/06/2022 35 (A) >60 mL/min/1.73 m^2 Final        ?   Imaging:  ?    Results for orders placed or performed during the hospital encounter of 03/17/22 (from the past 2160 hour(s))   CT Neck Chest With Contrast (XPD)    Impression    When compared to the PET-CT from 10/05/2021, the right sub pectoral lymph node conglomerate is not significantly changed measuring up to 6 cm maximally.  Other findings as above.      Electronically signed by: Philippe Deutsch MD  Date:    03/17/2022  Time:    09:50     *Note: Due to a large number of results and/or encounters for the requested time period, some results have not been displayed. A complete set of results can be found in Results Review.     No results found. However, due to the size of the patient record,  not all encounters were searched. Please check Results Review for a complete set of results.  No results found. However, due to the size of the patient record, not all encounters were searched. Please check Results Review for a complete set of results.      Pathology:    I have reviewed the patient's medical history in detail and updated the computerized patient record.       ?   Assessment/Plan:   Neuropathy  -     CBC Auto Differential; Future; Expected date: 06/06/2022  -     Comprehensive Metabolic Panel; Future; Expected date: 06/06/2022  -     pregabalin (LYRICA) 75 MG capsule; Take 1 capsule (75 mg total) by mouth 3 (three) times daily.  Dispense: 90 capsule; Refill: 2    History of pulmonary embolism  -     CBC Auto Differential; Future; Expected date: 06/06/2022  -     Comprehensive Metabolic Panel; Future; Expected date: 06/06/2022  -     pregabalin (LYRICA) 75 MG capsule; Take 1 capsule (75 mg total) by mouth 3 (three) times daily.  Dispense: 90 capsule; Refill: 2    Malignant neoplasm of upper-outer quadrant of right breast in female, estrogen receptor positive  -     CBC Auto Differential; Future; Expected date: 06/06/2022  -     Comprehensive Metabolic Panel; Future; Expected date: 06/06/2022  -     pregabalin (LYRICA) 75 MG capsule; Take 1 capsule (75 mg total) by mouth 3 (three) times daily.  Dispense: 90 capsule; Refill: 2    Morbid obesity with BMI of 45.0-49.9, adult  -     CBC Auto Differential; Future; Expected date: 06/06/2022  -     Comprehensive Metabolic Panel; Future; Expected date: 06/06/2022  -     pregabalin (LYRICA) 75 MG capsule; Take 1 capsule (75 mg total) by mouth 3 (three) times daily.  Dispense: 90 capsule; Refill: 2       1. Neuropathy    2. History of pulmonary embolism    3. Malignant neoplasm of upper-outer quadrant of right breast in female, estrogen receptor positive    4. Morbid obesity with BMI of 45.0-49.9, adult          Plan:     History of pulmonary embolism  Likely  provoked due to hypercoagulable state from breast cancer and obesity. Continue apixaban at 5 mg b.i.d..  Advised that she will be on long-term anticoagulation given recurrent thrombosis history as well as active malignancy. Reviewed labs from 02/28/2022 and noted no concerning cytopenia.  Noted improved renal function.    Malignant neoplasm of upper-outer quadrant of right breast in female, estrogen receptor positive  72-year-old female with stage IIA (pT2, pN0(sn), cM0, G2, ER+, TN-, HER2-) invasive lobular carcinoma of right breast, status post lumpectomy with sentinel lymph node biopsy in 2019.  Currently on Arimidex and tolerating well.  DEXA scan ordered.  Yet to be obtained.     Unfortunately, imaging studies have shown persistently enlarged right subpectoral lymph node as well as FDG avid left posterior cervical and supraclavicular lymph nodes.  Multiple biopsies in the past have been nondiagnostic for malignancy.  Case was presented at multi-disciplinary tumor conference with recommendation to continue Arimidex.  Patient was not satisfied with recommendation a requested for 2nd opinion.     She was evaluated by Dr. Sutton in [place] who recommended MRI of breast.     MRI was performed on 02/23/2021 and showed lymph node mass deep to the right pectoralis minor muscle measuring 6.6 x 4.7 x 2.9 cm. There were also adjacent satellite lymph nodes anteriorly to the dominant lymph node measured 1.1 x 0.8 cm and 0.6 x 0.4 cm.  Also described was a mass effect upon the right subclavian vein.  The mass does not in case or envelope the subclavian neurovascular structures.     Given the above findings, recommendation was made to excise the mass if not for diagnostic purposes for pain management.  She underwent lymph node excision on 03/01/2021, pathology returned back as lymph node with follicular hyperplasia. No evidence of metastatic carcinoma or lymphoma.     She was continued on Arimidex with plan for short  interval PET scan.     Patient had a PET scan on 04/14/2021 that showed interim right axillary lymph node excision with large residual hypermetabolic right subpectoral lymph node mass.  Also noted interval development of subcentimeter mildly FDG avid left posterior cervical and supraclavicular lymph nodes. No FDG avid mediastinal or hilar nodes.No FDG avid pulmonary nodules/masses.      Had diagnostic bilateral mammography on 09/24/2021, results showed a left breast subcentimeter mass at the 9 o'clock position which is new when compared to mammography from 2019.  Will plan a short-interval mammogram in 6 months to re-assess left breast mass.There was no mammographic evidence of disease in the right breast.     Saw Dr. Sutton in [place] who recommended a repeat PET-CT scan. Results from PET-CT scan showed interval enlargement of subpectoral mass on the right with sustained highly FDG avidity.     Me was she continues to complain of shoulder pain and breast discomfort.  Most recently CT neck chest with contrast was obtained on 03/17/2022, when compared to PET scan from October of 2021, the right subpectoral lymph node has not changed and still measures about 6 cm maximally.     Given continuing discomfort and pain associated with these enlarged lymph nodes.  She was referred to surgical oncologist following extensive conversation, it appears the risk of repeat surgical resection outweighs the benefits.  Decision was made to optimize neuropathic pain management.    She will be started Lyrica and monitored closely. Meanwhile I recommend continued Arimidex and pain management.     Return in 4 weeks with repeat labs or sooner if needed.    Morbid obesity with BMI of 45.0-49.9, adult  Counseled on lifestyle modification and exercise.    Neuropathy  -     CBC Auto Differential; Future; Expected date: 06/06/2022  -     Comprehensive Metabolic Panel; Future; Expected date: 06/06/2022  -     pregabalin (LYRICA) 75 MG  capsule; Take 1 capsule (75 mg total) by mouth 3 (three) times daily.  Dispense: 90 capsule; Refill: 2    History of pulmonary embolism  -     CBC Auto Differential; Future; Expected date: 06/06/2022  -     Comprehensive Metabolic Panel; Future; Expected date: 06/06/2022  -     pregabalin (LYRICA) 75 MG capsule; Take 1 capsule (75 mg total) by mouth 3 (three) times daily.  Dispense: 90 capsule; Refill: 2    Malignant neoplasm of upper-outer quadrant of right breast in female, estrogen receptor positive  -     CBC Auto Differential; Future; Expected date: 06/06/2022  -     Comprehensive Metabolic Panel; Future; Expected date: 06/06/2022  -     pregabalin (LYRICA) 75 MG capsule; Take 1 capsule (75 mg total) by mouth 3 (three) times daily.  Dispense: 90 capsule; Refill: 2    Morbid obesity with BMI of 45.0-49.9, adult  -     CBC Auto Differential; Future; Expected date: 06/06/2022  -     Comprehensive Metabolic Panel; Future; Expected date: 06/06/2022  -     pregabalin (LYRICA) 75 MG capsule; Take 1 capsule (75 mg total) by mouth 3 (three) times daily.  Dispense: 90 capsule; Refill: 2        Jose Clarke MD

## 2022-06-06 NOTE — PROGRESS NOTES
Patient Information  Name: Susu Luther  : 1950  MRN: [id number]     Referring Physician:  Dr. Peterson   Primary Care Physician:  Dr. Peace Arias MD   Date of Visit: 2022      Subjective:       Susu Luther is a 72 y.o. female who presents for   Chief Complaint   Patient presents with    Cyst     Cyst on back, x one year     HPI  Patient with reported hx of cyst on back. Previously had ultrasound that didn't show any findings. Patient reports unsure if area of interest was looked at. Would like repeat ultrasound. She denies hx of spinal injury, neck injury. The area continues to have a sharp shooting pain.    Patient was last seen:Visit date not found     Prior notes by myself reviewed.   Clinical documentation obtained by nursing staff reviewed.    Review of Systems   Skin: Negative for itching and rash.        Objective:    Physical Exam   Constitutional: She appears well-developed and well-nourished. No distress.   Neurological: She is alert and oriented to person, place, and time. She is not disoriented.   Psychiatric: She has a normal mood and affect.   Skin:   Areas Examined (abnormalities noted in diagram):   Back Inspection Performed              Diagram Legend     Erythematous scaling macule/papule c/w actinic keratosis       Vascular papule c/w angioma      Pigmented verrucoid papule/plaque c/w seborrheic keratosis      Yellow umbilicated papule c/w sebaceous hyperplasia      Irregularly shaped tan macule c/w lentigo     1-2 mm smooth white papules consistent with Milia      Movable subcutaneous cyst with punctum c/w epidermal inclusion cyst      Subcutaneous movable cyst c/w pilar cyst      Firm pink to brown papule c/w dermatofibroma      Pedunculated fleshy papule(s) c/w skin tag(s)      Evenly pigmented macule c/w junctional nevus     Mildly variegated pigmented, slightly irregular-bordered macule c/w mildly atypical nevus      Flesh colored to evenly pigmented papule c/w  intradermal nevus       Pink pearly papule/plaque c/w basal cell carcinoma      Erythematous hyperkeratotic cursted plaque c/w SCC      Surgical scar with no sign of skin cancer recurrence      Open and closed comedones      Inflammatory papules and pustules      Verrucoid papule consistent consistent with wart     Erythematous eczematous patches and plaques     Dystrophic onycholytic nail with subungual debris c/w onychomycosis     Umbilicated papule    Erythematous-base heme-crusted tan verrucoid plaque consistent with inflamed seborrheic keratosis     Erythematous Silvery Scaling Plaque c/w Psoriasis     See annotation      No images are attached to the encounter or orders placed in the encounter.    [] Data reviewed  [] Independent review of test  [] Management discussed with another provider    Assessment / Plan:        Dermatitis  -     US Soft Tissue Chest_Upper Back; Future; Expected date: 06/06/2022  - If ultrasound negative, suspect notalgia paresthetica. Consider topical capsaizin or topical lidocaine             LOS NUMBER AND COMPLEXITY OF PROBLEMS    COMPLEXITY OF DATA RISK TOTAL TIME (m)   97588  [id number] [] 1 self-limited or minor problem [] Minimal to none [] No treatment recommended or patient to monitor 15-29  10-19   30549  29591 Low  [] 2 or > self limited or minor problems  [] 1 stable chronic illness  [] 1 acute, uncomplicated illness or injury Limited (2)  [] Prior external notes from each unique source  [] Review result of each unique test  [x] Order each unique test [x]  Low  OTC medications, minor skin biopsy 30-44  20-29   91223  68933 Moderate  [x]  1 or > chronic illness with progression, exacerbation or SE of treatment  []  2 or more stable chronic illnesses  []  1 acute illness with systemic symptoms  []  1 acute complicated injury  []  1 undiagnosed new problem with uncertain prognosis Moderate (1/3 below)  []  3 or more data items        *Now includes assessment requiring independent  historian  []  Independent interpretation of a test  []  Discuss management/test with another provider Moderate  []  Prescription drug mgmt  []  Minor surgery with risk discussed  []  Mgmt limited by social determinates 45-59  30-39   76449  98091 High  []  1 or more chronic illness with severe exacerbation, progression or SE of treatment  []  1 acute or chronic illness/injury that poses a threat to life or bodily function Extensive (2/3 below)  []  3 or more data items        *Now includes assessment requiring independent historian.  []  Independent interpretation of a test  []  Discuss management/test with another provider High  []  Major surgery with risk discussed  []  Drug therapy requiring intensive monitoring for toxicity  []  Hospitalization  []  Decision for DNR 60-74  40-54      No follow-ups on file.    Rubi Sanchez MD, FAAD  [facility]

## 2022-06-06 NOTE — ASSESSMENT & PLAN NOTE
72-year-old female with stage IIA (pT2, pN0(sn), cM0, G2, ER+, UT-, HER2-) invasive lobular carcinoma of right breast, status post lumpectomy with sentinel lymph node biopsy in 2019.  Currently on Arimidex and tolerating well.  DEXA scan ordered.  Yet to be obtained.     Unfortunately, imaging studies have shown persistently enlarged right subpectoral lymph node as well as FDG avid left posterior cervical and supraclavicular lymph nodes.  Multiple biopsies in the past have been nondiagnostic for malignancy.  Case was presented at multi-disciplinary tumor conference with recommendation to continue Arimidex.  Patient was not satisfied with recommendation a requested for 2nd opinion.     She was evaluated by Dr. Sutton in [place] who recommended MRI of breast.     MRI was performed on 02/23/2021 and showed lymph node mass deep to the right pectoralis minor muscle measuring 6.6 x 4.7 x 2.9 cm. There were also adjacent satellite lymph nodes anteriorly to the dominant lymph node measured 1.1 x 0.8 cm and 0.6 x 0.4 cm.  Also described was a mass effect upon the right subclavian vein.  The mass does not in case or envelope the subclavian neurovascular structures.     Given the above findings, recommendation was made to excise the mass if not for diagnostic purposes for pain management.  She underwent lymph node excision on 03/01/2021, pathology returned back as lymph node with follicular hyperplasia. No evidence of metastatic carcinoma or lymphoma.     She was continued on Arimidex with plan for short interval PET scan.     Patient had a PET scan on 04/14/2021 that showed interim right axillary lymph node excision with large residual hypermetabolic right subpectoral lymph node mass.  Also noted interval development of subcentimeter mildly FDG avid left posterior cervical and supraclavicular lymph nodes. No FDG avid mediastinal or hilar nodes.No FDG avid pulmonary nodules/masses.      Had diagnostic bilateral  mammography on 09/24/2021, results showed a left breast subcentimeter mass at the 9 o'clock position which is new when compared to mammography from 2019.  Will plan a short-interval mammogram in 6 months to re-assess left breast mass.There was no mammographic evidence of disease in the right breast.     Saw Dr. Sutton in [place] who recommended a repeat PET-CT scan. Results from PET-CT scan showed interval enlargement of subpectoral mass on the right with sustained highly FDG avidity.     Me was she continues to complain of shoulder pain and breast discomfort.  Most recently CT neck chest with contrast was obtained on 03/17/2022, when compared to PET scan from October of 2021, the right subpectoral lymph node has not changed and still measures about 6 cm maximally.     Given continuing discomfort and pain associated with these enlarged lymph nodes.  She was referred to surgical oncologist following extensive conversation, it appears the risk of repeat surgical resection outweighs the benefits.  Decision was made to optimize neuropathic pain management.    She will be started Lyrica (discontinued gabapentin due to ineffectiveness) and monitored closely. Meanwhile I recommend continued Arimidex and pain management.     Return in 4 weeks with repeat labs or sooner if needed.

## 2022-06-08 ENCOUNTER — OFFICE VISIT (OUTPATIENT)
Dept: INTERNAL MEDICINE | Facility: CLINIC | Age: 72
End: 2022-06-08
Payer: MEDICARE

## 2022-06-08 ENCOUNTER — OFFICE VISIT (OUTPATIENT)
Dept: OPHTHALMOLOGY | Facility: CLINIC | Age: 72
End: 2022-06-08
Payer: MEDICARE

## 2022-06-08 ENCOUNTER — PATIENT MESSAGE (OUTPATIENT)
Dept: OPHTHALMOLOGY | Facility: CLINIC | Age: 72
End: 2022-06-08

## 2022-06-08 VITALS
HEIGHT: 69 IN | DIASTOLIC BLOOD PRESSURE: 84 MMHG | OXYGEN SATURATION: 98 % | BODY MASS INDEX: 43.4 KG/M2 | SYSTOLIC BLOOD PRESSURE: 122 MMHG | TEMPERATURE: 97 F | HEART RATE: 70 BPM | RESPIRATION RATE: 18 BRPM | WEIGHT: 293 LBS

## 2022-06-08 VITALS
WEIGHT: 293 LBS | HEIGHT: 69 IN | BODY MASS INDEX: 43.4 KG/M2 | HEART RATE: 70 BPM | OXYGEN SATURATION: 98 % | SYSTOLIC BLOOD PRESSURE: 122 MMHG | DIASTOLIC BLOOD PRESSURE: 84 MMHG

## 2022-06-08 DIAGNOSIS — I72.8 SPLENIC ARTERY ANEURYSM: ICD-10-CM

## 2022-06-08 DIAGNOSIS — I10 HYPERTENSION, UNSPECIFIED TYPE: ICD-10-CM

## 2022-06-08 DIAGNOSIS — G47.33 OSA (OBSTRUCTIVE SLEEP APNEA): ICD-10-CM

## 2022-06-08 DIAGNOSIS — F41.9 ANXIETY: ICD-10-CM

## 2022-06-08 DIAGNOSIS — F32.5 MAJOR DEPRESSIVE DISORDER IN FULL REMISSION, UNSPECIFIED WHETHER RECURRENT: ICD-10-CM

## 2022-06-08 DIAGNOSIS — G62.9 NEUROPATHY: ICD-10-CM

## 2022-06-08 DIAGNOSIS — M79.605 PAIN IN BOTH LOWER EXTREMITIES: Primary | ICD-10-CM

## 2022-06-08 DIAGNOSIS — E04.2 MULTIPLE THYROID NODULES: ICD-10-CM

## 2022-06-08 DIAGNOSIS — N18.32 STAGE 3B CHRONIC KIDNEY DISEASE: ICD-10-CM

## 2022-06-08 DIAGNOSIS — G45.3 AMAUROSIS FUGAX, BOTH EYES: Primary | ICD-10-CM

## 2022-06-08 DIAGNOSIS — Z86.711 HISTORY OF PULMONARY EMBOLISM: ICD-10-CM

## 2022-06-08 DIAGNOSIS — R26.9 ABNORMALITY OF GAIT AND MOBILITY: ICD-10-CM

## 2022-06-08 DIAGNOSIS — M54.2 NECK PAIN: ICD-10-CM

## 2022-06-08 DIAGNOSIS — M54.50 LUMBAR PAIN: ICD-10-CM

## 2022-06-08 DIAGNOSIS — I50.32 CHRONIC DIASTOLIC CHF (CONGESTIVE HEART FAILURE): ICD-10-CM

## 2022-06-08 DIAGNOSIS — H52.7 REFRACTIVE ERRORS: ICD-10-CM

## 2022-06-08 DIAGNOSIS — M79.604 PAIN IN BOTH LOWER EXTREMITIES: Primary | ICD-10-CM

## 2022-06-08 DIAGNOSIS — I10 PRIMARY HYPERTENSION: ICD-10-CM

## 2022-06-08 DIAGNOSIS — E78.5 HYPERLIPIDEMIA, UNSPECIFIED HYPERLIPIDEMIA TYPE: ICD-10-CM

## 2022-06-08 DIAGNOSIS — Z59.9 FINANCIAL DIFFICULTIES: ICD-10-CM

## 2022-06-08 DIAGNOSIS — Z87.898 HISTORY OF PREDIABETES: ICD-10-CM

## 2022-06-08 DIAGNOSIS — Z00.00 ENCOUNTER FOR PREVENTIVE HEALTH EXAMINATION: Primary | ICD-10-CM

## 2022-06-08 DIAGNOSIS — R63.0 LOSS OF APPETITE: ICD-10-CM

## 2022-06-08 DIAGNOSIS — Z74.09 OTHER REDUCED MOBILITY: ICD-10-CM

## 2022-06-08 DIAGNOSIS — G47.34 NOCTURNAL HYPOXEMIA: ICD-10-CM

## 2022-06-08 DIAGNOSIS — E66.01 OBESITY, CLASS III, BMI 40-49.9 (MORBID OBESITY): ICD-10-CM

## 2022-06-08 DIAGNOSIS — C50.411 MALIGNANT NEOPLASM OF UPPER-OUTER QUADRANT OF RIGHT BREAST IN FEMALE, ESTROGEN RECEPTOR POSITIVE: Chronic | ICD-10-CM

## 2022-06-08 DIAGNOSIS — Z17.0 MALIGNANT NEOPLASM OF UPPER-OUTER QUADRANT OF RIGHT BREAST IN FEMALE, ESTROGEN RECEPTOR POSITIVE: Chronic | ICD-10-CM

## 2022-06-08 DIAGNOSIS — Z86.73 HISTORY OF STROKE: ICD-10-CM

## 2022-06-08 PROCEDURE — 3074F SYST BP LT 130 MM HG: CPT | Mod: CPTII,S$GLB,, | Performed by: NURSE PRACTITIONER

## 2022-06-08 PROCEDURE — 4010F PR ACE/ARB THEARPY RXD/TAKEN: ICD-10-PCS | Mod: CPTII,S$GLB,, | Performed by: NURSE PRACTITIONER

## 2022-06-08 PROCEDURE — 3288F PR FALLS RISK ASSESSMENT DOCUMENTED: ICD-10-PCS | Mod: CPTII,S$GLB,, | Performed by: NURSE PRACTITIONER

## 2022-06-08 PROCEDURE — 3079F PR MOST RECENT DIASTOLIC BLOOD PRESSURE 80-89 MM HG: ICD-10-PCS | Mod: CPTII,S$GLB,, | Performed by: NURSE PRACTITIONER

## 2022-06-08 PROCEDURE — 1100F PR PT FALLS ASSESS DOC 2+ FALLS/FALL W/INJURY/YR: ICD-10-PCS | Mod: CPTII,S$GLB,, | Performed by: NURSE PRACTITIONER

## 2022-06-08 PROCEDURE — 4010F ACE/ARB THERAPY RXD/TAKEN: CPT | Mod: ,,, | Performed by: PHYSICIAN ASSISTANT

## 2022-06-08 PROCEDURE — 92014 PR EYE EXAM, EST PATIENT,COMPREHESV: ICD-10-PCS | Mod: S$PBB,,, | Performed by: OPTOMETRIST

## 2022-06-08 PROCEDURE — 92015 DETERMINE REFRACTIVE STATE: CPT | Mod: ,,, | Performed by: OPTOMETRIST

## 2022-06-08 PROCEDURE — 4010F ACE/ARB THERAPY RXD/TAKEN: CPT | Mod: ,,, | Performed by: OPTOMETRIST

## 2022-06-08 PROCEDURE — 1100F PTFALLS ASSESS-DOCD GE2>/YR: CPT | Mod: CPTII,S$GLB,, | Performed by: NURSE PRACTITIONER

## 2022-06-08 PROCEDURE — 3008F PR BODY MASS INDEX (BMI) DOCUMENTED: ICD-10-PCS | Mod: CPTII,S$GLB,, | Performed by: NURSE PRACTITIONER

## 2022-06-08 PROCEDURE — 1170F FXNL STATUS ASSESSED: CPT | Mod: CPTII,S$GLB,, | Performed by: NURSE PRACTITIONER

## 2022-06-08 PROCEDURE — 99213 PR OFFICE/OUTPT VISIT, EST, LEVL III, 20-29 MIN: ICD-10-PCS | Mod: S$GLB,,, | Performed by: PHYSICIAN ASSISTANT

## 2022-06-08 PROCEDURE — 4010F PR ACE/ARB THEARPY RXD/TAKEN: ICD-10-PCS | Mod: ,,, | Performed by: OPTOMETRIST

## 2022-06-08 PROCEDURE — 4010F ACE/ARB THERAPY RXD/TAKEN: CPT | Mod: CPTII,S$GLB,, | Performed by: NURSE PRACTITIONER

## 2022-06-08 PROCEDURE — 92014 COMPRE OPH EXAM EST PT 1/>: CPT | Mod: S$PBB,,, | Performed by: OPTOMETRIST

## 2022-06-08 PROCEDURE — 99999 PR PBB SHADOW E&M-EST. PATIENT-LVL V: CPT | Mod: PBBFAC,,, | Performed by: NURSE PRACTITIONER

## 2022-06-08 PROCEDURE — 1170F PR FUNCTIONAL STATUS ASSESSED: ICD-10-PCS | Mod: CPTII,S$GLB,, | Performed by: NURSE PRACTITIONER

## 2022-06-08 PROCEDURE — 99999 PR PBB SHADOW E&M-EST. PATIENT-LVL V: CPT | Mod: PBBFAC,,, | Performed by: PHYSICIAN ASSISTANT

## 2022-06-08 PROCEDURE — 99999 PR PBB SHADOW E&M-EST. PATIENT-LVL V: ICD-10-PCS | Mod: PBBFAC,,, | Performed by: NURSE PRACTITIONER

## 2022-06-08 PROCEDURE — 99999 PR PBB SHADOW E&M-EST. PATIENT-LVL V: ICD-10-PCS | Mod: PBBFAC,,, | Performed by: PHYSICIAN ASSISTANT

## 2022-06-08 PROCEDURE — 3008F BODY MASS INDEX DOCD: CPT | Mod: CPTII,S$GLB,, | Performed by: NURSE PRACTITIONER

## 2022-06-08 PROCEDURE — G0439 PR MEDICARE ANNUAL WELLNESS SUBSEQUENT VISIT: ICD-10-PCS | Mod: S$GLB,,, | Performed by: NURSE PRACTITIONER

## 2022-06-08 PROCEDURE — 3079F DIAST BP 80-89 MM HG: CPT | Mod: CPTII,S$GLB,, | Performed by: NURSE PRACTITIONER

## 2022-06-08 PROCEDURE — 92015 PR REFRACTION: ICD-10-PCS | Mod: ,,, | Performed by: OPTOMETRIST

## 2022-06-08 PROCEDURE — 99215 OFFICE O/P EST HI 40 MIN: CPT | Mod: PBBFAC | Performed by: NURSE PRACTITIONER

## 2022-06-08 PROCEDURE — 4010F PR ACE/ARB THEARPY RXD/TAKEN: ICD-10-PCS | Mod: ,,, | Performed by: PHYSICIAN ASSISTANT

## 2022-06-08 PROCEDURE — G0439 PPPS, SUBSEQ VISIT: HCPCS | Mod: S$GLB,,, | Performed by: NURSE PRACTITIONER

## 2022-06-08 PROCEDURE — 3074F PR MOST RECENT SYSTOLIC BLOOD PRESSURE < 130 MM HG: ICD-10-PCS | Mod: CPTII,S$GLB,, | Performed by: NURSE PRACTITIONER

## 2022-06-08 PROCEDURE — 99215 OFFICE O/P EST HI 40 MIN: CPT | Mod: PBBFAC | Performed by: PHYSICIAN ASSISTANT

## 2022-06-08 PROCEDURE — 99213 OFFICE O/P EST LOW 20 MIN: CPT | Mod: S$GLB,,, | Performed by: PHYSICIAN ASSISTANT

## 2022-06-08 PROCEDURE — 3288F FALL RISK ASSESSMENT DOCD: CPT | Mod: CPTII,S$GLB,, | Performed by: NURSE PRACTITIONER

## 2022-06-08 RX ORDER — TIZANIDINE 4 MG/1
4 TABLET ORAL EVERY 8 HOURS PRN
Qty: 20 TABLET | Refills: 0 | Status: ON HOLD | OUTPATIENT
Start: 2022-06-08 | End: 2022-06-15 | Stop reason: SDUPTHER

## 2022-06-08 SDOH — SOCIAL DETERMINANTS OF HEALTH (SDOH): PROBLEM RELATED TO HOUSING AND ECONOMIC CIRCUMSTANCES, UNSPECIFIED: Z59.9

## 2022-06-08 NOTE — Clinical Note
Ms Luther has had one episode of amaurosis fugax in March, none since. She cannot remember if you have done a carotid US.

## 2022-06-08 NOTE — PATIENT INSTRUCTIONS
Counseling and Referral of Other Preventative  (Italic type indicates deductible and co-insurance are waived)    Patient Name: uSsu Luther  Today's Date: 6/8/2022    Health Maintenance       Date Due Completion Date    TETANUS VACCINE Never done ---    Shingles Vaccine (1 of 2) Never done ---    Colorectal Cancer Screening Never done ---    Pneumococcal Vaccines (Age 65+) (2 - PPSV23 or PCV20) 08/25/2021 8/25/2020    COVID-19 Vaccine (4 - Booster for Moderna series) 03/29/2022 11/29/2021    Mammogram 03/17/2023 3/17/2022    Lipid Panel 04/18/2023 4/18/2022    High Dose Statin 06/08/2023 6/8/2022    DEXA Scan 07/27/2024 7/27/2021        Orders Placed This Encounter   Procedures    Ambulatory referral/consult to Outpatient Case Management     The following information is provided to all patients.  This information is to help you find resources for any of the problems found today that may be affecting your health:                Living healthy guide: www.[facility].[place].gov      Understanding Diabetes: www.diabetes.org      Eating healthy: www.cdc.gov/healthyweight      CDC home safety checklist: www.cdc.gov/steadi/patient.html      Agency on Aging: www.goea.[place].gov      Alcoholics anonymous (AA): www.aa.org      Physical Activity: www.romulo.nih.gov/kn5qara      Tobacco use: www.quitwithusla.org

## 2022-06-08 NOTE — PROGRESS NOTES
HPI     Blurry Vision Watery Eye Saw Dr Cloud she gave her some drops they have   helped some   Blood Pressure was up in March and her Vision went completely black and   stayed gone for 2-3 seconds then it came back    Last edited by Raul Boyd, OD on 6/8/2022  2:23 PM. (History)            Assessment /Plan     For exam results, see Encounter Report.    Amaurosis fugax, both eyes    History of pulmonary embolism    History of prediabetes    Primary hypertension    Refractive errors      Pt unsure whether Dr Vincent has done carotid ultrasound recently.  I will send copy of this note to his office to notify him of the amaurosis.    No Background Diabetic Retinopathy    Stable IOL OU.    No HTN Retinopathy    Dispense Final Rx for glasses.  RTC 1 year  Discussed above and answered questions.

## 2022-06-08 NOTE — PROGRESS NOTES
"  Susu Luther presented for a  Medicare AWV and comprehensive Health Risk Assessment today. The following components were reviewed and updated:    · Medical history  · Family History  · Social history  · Allergies and Current Medications  · Health Risk Assessment  · Health Maintenance  · Care Team         ** See Completed Assessments for Annual Wellness Visit within the encounter summary.**         The following assessments were completed:  · Living Situation  · CAGE  · Depression Screening  · Timed Get Up and Go  · Whisper Test  · Cognitive Function Screening  · Nutrition Screening  · ADL Screening  · PAQ Screening        Vitals:    06/08/22 1229   BP: 122/84   Pulse: 70   SpO2: 98%   Weight: (!) 140.4 kg (309 lb 6.7 oz)   Height: 5' 9" (1.753 m)     Body mass index is 45.69 kg/m².  Physical Exam  Vitals and nursing note reviewed.   Constitutional:       Appearance: She is well-developed.   HENT:      Head: Normocephalic.   Cardiovascular:      Rate and Rhythm: Normal rate and regular rhythm.      Pulses:           Dorsalis pedis pulses are 2+ on the right side and 2+ on the left side.      Heart sounds: Normal heart sounds.   Pulmonary:      Effort: Pulmonary effort is normal. No respiratory distress.      Breath sounds: Normal breath sounds.   Abdominal:      Palpations: Abdomen is soft. There is no mass.      Tenderness: There is no abdominal tenderness.   Musculoskeletal:         General: Normal range of motion.   Skin:     General: Skin is warm and dry.   Neurological:      Mental Status: She is alert and oriented to person, place, and time.      Motor: No abnormal muscle tone.   Psychiatric:         Speech: Speech normal.         Behavior: Behavior normal.               Diagnoses and health risks identified today and associated recommendations/orders:    1. Encounter for preventive health examination  Discussed locations to receive COVID booster  Discussed receiving Shingrix, Tetanus vaccine, and " pneumonia vaccine at pharmacy.  She will okay vaccines with oncology.   Encouraged to complete FITKIT    MA to schedule (staff message sent to her, as she is out today)  Pulmonary     2. Malignant neoplasm of upper-outer quadrant of right breast in female, estrogen receptor positive  Continue current treatment plan as previously prescribed with your  Oncologist.     3. Neuropathy  Chronic numbness and tingling to BUE and BLE  Continue current treatment plan as previously prescribed with your  Oncologist.     4. Chronic diastolic CHF (congestive heart failure)  Continue current treatment plan as previously prescribed with your  Cardiologist.     5. Splenic artery aneurysm  PET 4/21  S/p intervention  Continue current treatment plan as previously prescribed with your  Cardiologist.     6. Major depressive disorder in full remission, unspecified whether recurrent  Reports stress but is not problematic at this time. Patient knows to follow up with PCP/psychologist if becomes problematic.    PHQ 2-0  Continue current treatment plan as previously prescribed with your  Psychologist.     7. Stage 3b chronic kidney disease  Stable. Continue current treatment plan as previously prescribed with your  pcp     8. Hyperlipidemia, unspecified hyperlipidemia type  Continue current treatment plan as previously prescribed with your  Cardiologist.     9. Hypertension, unspecified type  Continue current treatment plan as previously prescribed with your  pcp and cardiologist.     10. Obesity, Class III, BMI 40-49.9 (morbid obesity)  Encouraged healthy diet and exercise as tolerated to help bring BMI into normal range.    Continue current treatment plan as previously prescribed with your  pcp     11. Multiple thyroid nodules  Ct 3/22  Advised to follow up with PCP for further evaluation and recommendations. Patient expressed understanding.      12. NILS (obstructive sleep apnea)  Continue current treatment plan as previously prescribed  with your  Pulmonologist.     13. Nocturnal hypoxemia  See #12    14. History of prediabetes  a1c 5.9, 2019  Advised to follow up with PCP for further evaluation and recommendations. Patient expressed understanding.      15. History of stroke  Continue current treatment plan as previously prescribed with your  Providers.     16. History of pulmonary embolism  Continue current treatment plan as previously prescribed with your  Hematologist.     17. Financial difficulties  [facility] financial resources information page given to patient.    - Ambulatory referral/consult to Outpatient Case Management    18. Loss of appetite  Causing a decrease in food intake  Advised to follow up with PCP/oncologist for further evaluation and recommendations. Patient expressed understanding.      19. Abnormality of gait and mobility  Abnormal timed get up and go test. Reports 2+ falls in the last 12 months. Uses a walker prn to aid with ambulation.   Fall precautions reviewed with patient. Advised to follow up with PCP for further recommendations. Patient expressed understanding.       20. Other reduced mobility  See #19      Provided Susu with a 5-10 year written screening schedule and personal prevention plan. Recommendations were developed using the USPSTF age appropriate recommendations. Education, counseling, and referrals were provided as needed. After Visit Summary printed and given to patient which includes a list of additional screenings\tests needed.    Follow up in about 1 year (around 6/8/2023) for awv.    Venita Quijano NP  I offered to discuss advanced care planning, including how to pick a person who would make decisions for you if you were unable to make them for yourself, called a health care power of , and what kind of decisions you might make such as use of life sustaining treatments such as ventilators and tube feeding when faced with a life limiting illness recorded on a living will that they will need  to know. (How you want to be cared for as you near the end of your natural life)     X Patient is interested in learning more about how to make advanced directives.  I provided them paperwork and offered to discuss this with them.

## 2022-06-08 NOTE — PROGRESS NOTES
Subjective:       Patient ID: Susu Luther is a 72 y.o. female.    Chief Complaint: Anxiety      Patient Care Team:  Peace Arias MD as PCP - General (Internal Medicine)  Wilda Lopez LMSW as  (Hematology and Oncology)  Elana Couch LPN as Care Coordinator  Jerica Shelby as Digital Medicine Health   Jimena De Oliveira PharmD as Hypertension Digital Medicine Clinician (Pharmacist)  Jarad Gonzalez MD as Hypertension Digital Medicine Responsible Provider (Cardiology)  Jose Clarke MD as Consulting Physician (Hematology and Oncology)  [facility] as Hypertension Digital Medicine Contract  Arlene Hobbs MD as Consulting Physician (Cardiology)  Raul Boyd OD as Consulting Physician (Optometry)  Cecily Aviles, PhD as Consulting Physician (Psychiatry)  Blank Cortez NP as Nurse Practitioner (Pulmonary Disease)  Adi Wellington III, MD as Consulting Physician (Radiation Oncology)  Rubi Sanchez MD as Consulting Physician (Dermatology)  Rogelio Vincent MD as Consulting Physician (Vascular Surgery)    HPI  Patient is new to me.    Susu Luther is a 72 y.o. female who presents today with complaints of Anxiety  Patient has breast cancer with extensive medical history presenting with anxiety and diffuse muscle pains, specifically lower back, neck and upper shoulders and right thigh. She denies recent fall or injury but has had a fall in the past with imaging performed at that time and no fracture found. She is currently taking Percocet and switched to Lyrica 2 days ago for neuropathy. She is currently on eliquis.     Review of Systems   Constitutional: Negative for chills and fever.   Respiratory: Positive for shortness of breath ( chronic - unchanged).    Cardiovascular: Negative for chest pain.   Musculoskeletal: Positive for back pain, myalgias and neck pain.   Neurological: Negative for weakness and numbness.   Psychiatric/Behavioral:  The patient is nervous/anxious.        Objective:      Physical Exam  Vitals and nursing note reviewed.   Constitutional:       General: She is not in acute distress.     Appearance: She is well-developed.      Comments: Used a walker for assistive device   HENT:      Head: Normocephalic and atraumatic.   Eyes:      General: Lids are normal. No scleral icterus.     Extraocular Movements: Extraocular movements intact.      Conjunctiva/sclera: Conjunctivae normal.   Cardiovascular:      Rate and Rhythm: Normal rate and regular rhythm.   Pulmonary:      Effort: Pulmonary effort is normal.      Breath sounds: Normal breath sounds. No decreased breath sounds, wheezing, rhonchi or rales.   Musculoskeletal:        Back:    Neurological:      Mental Status: She is alert.      Cranial Nerves: No cranial nerve deficit.   Psychiatric:         Mood and Affect: Mood and affect normal.         Assessment:       1. Pain in both lower extremities    2. Lumbar pain    3. Neck pain    4. Anxiety        Plan:   1. Pain in both lower extremities    2. Lumbar pain  -     tiZANidine (ZANAFLEX) 4 MG tablet    3. Neck pain  -     tiZANidine (ZANAFLEX) 4 MG tablet    4. Anxiety      Will add muscle relaxer but encouraged to continue Percocet and Lyrica as prescribed and f/u with PCP is symptoms persist or worsen.   Patient currently on anxiety medication.

## 2022-06-09 ENCOUNTER — PATIENT MESSAGE (OUTPATIENT)
Dept: INTERNAL MEDICINE | Facility: CLINIC | Age: 72
End: 2022-06-09
Payer: MEDICARE

## 2022-06-09 ENCOUNTER — TELEPHONE (OUTPATIENT)
Dept: INTERNAL MEDICINE | Facility: CLINIC | Age: 72
End: 2022-06-09
Payer: MEDICARE

## 2022-06-09 DIAGNOSIS — Z17.0 MALIGNANT NEOPLASM OF UPPER-OUTER QUADRANT OF RIGHT BREAST IN FEMALE, ESTROGEN RECEPTOR POSITIVE: Chronic | ICD-10-CM

## 2022-06-09 DIAGNOSIS — C50.411 MALIGNANT NEOPLASM OF UPPER-OUTER QUADRANT OF RIGHT BREAST IN FEMALE, ESTROGEN RECEPTOR POSITIVE: Chronic | ICD-10-CM

## 2022-06-09 DIAGNOSIS — F41.9 ANXIETY: ICD-10-CM

## 2022-06-09 RX ORDER — INSULIN PUMP SYRINGE, 3 ML
EACH MISCELLANEOUS
COMMUNITY
End: 2022-06-09 | Stop reason: SDUPTHER

## 2022-06-09 NOTE — TELEPHONE ENCOUNTER
----- Message from Venita Quijano NP sent at 6/8/2022  1:00 PM CDT -----  Please contact to lucy reeves NILS/SOB

## 2022-06-09 NOTE — TELEPHONE ENCOUNTER
1st attempt to reach Ms Cristobal. I left a voicemail for the patient to call the office to assist in scheduling her appointment with Blank Cortez in Pulmonary for shortness of breath & sleep apnea.

## 2022-06-09 NOTE — TELEPHONE ENCOUNTER
No new care gaps identified.  [facility] Embedded Care Gaps. Reference number: [id number]. 6/09/2022   4:21:37 PM CDT

## 2022-06-09 NOTE — TELEPHONE ENCOUNTER
2nd attempt to reach Ms Cristobal. I left a voicemail and a MyChart message for the patient to call the office to assist in scheduling her appointment with Blank Cortez in Pulmonary for shortness of breath & sleep apnea.

## 2022-06-09 NOTE — TELEPHONE ENCOUNTER
----- Message from Breann Andrade sent at 6/9/2022  4:07 PM CDT -----  Contact: Gary/Negar [phone]  Requesting update on requesting for BD Alcohol swabs, Accu Check Reema solution, and ALPRAZolam (XANAX) 0.5 MG tablet. Please advise or send to [facility] mail order.

## 2022-06-09 NOTE — TELEPHONE ENCOUNTER
----- Message from Breann Andrade sent at 6/9/2022  4:07 PM CDT -----  Contact: Gary/Negar [phone]  Requesting update on requesting for BD Alcohol swabs, Accu Check Reema solution, and ALPRAZolam (XANAX) 0.5 MG tablet. Please advise or send to [facility] mail order.

## 2022-06-10 RX ORDER — INSULIN PUMP SYRINGE, 3 ML
1 EACH MISCELLANEOUS DAILY PRN
Qty: 1 EACH | Refills: 1 | Status: SHIPPED | OUTPATIENT
Start: 2022-06-10 | End: 2022-07-01

## 2022-06-10 RX ORDER — ISOPROPYL ALCOHOL 70 ML/100ML
1 SWAB TOPICAL DAILY
Qty: 100 EACH | Refills: 2 | Status: SHIPPED | OUTPATIENT
Start: 2022-06-10 | End: 2022-07-01

## 2022-06-10 RX ORDER — ALPRAZOLAM 0.5 MG/1
0.5 TABLET ORAL 2 TIMES DAILY PRN
Qty: 60 TABLET | Refills: 0 | Status: SHIPPED | OUTPATIENT
Start: 2022-06-10 | End: 2022-07-01 | Stop reason: SDUPTHER

## 2022-06-10 RX ORDER — DEXTROSE 4 G
1 TABLET,CHEWABLE ORAL 2 TIMES DAILY
Qty: 1 EACH | Refills: 0 | Status: SHIPPED | OUTPATIENT
Start: 2022-06-10 | End: 2022-07-01

## 2022-06-10 RX ORDER — LANCETS
1 EACH MISCELLANEOUS 2 TIMES DAILY
Qty: 200 EACH | Refills: 3 | Status: SHIPPED | OUTPATIENT
Start: 2022-06-10 | End: 2022-07-01

## 2022-06-10 NOTE — TELEPHONE ENCOUNTER
No new care gaps identified.  [facility] Embedded Care Gaps. Reference number: [id number]. 6/10/2022   11:40:38 AM BRANDYT

## 2022-06-13 ENCOUNTER — OFFICE VISIT (OUTPATIENT)
Dept: CARDIOLOGY | Facility: CLINIC | Age: 72
End: 2022-06-13
Payer: MEDICARE

## 2022-06-13 ENCOUNTER — HOSPITAL ENCOUNTER (OUTPATIENT)
Facility: HOSPITAL | Age: 72
Discharge: HOME HEALTH | End: 2022-06-15
Attending: EMERGENCY MEDICINE | Admitting: INTERNAL MEDICINE
Payer: MEDICARE

## 2022-06-13 ENCOUNTER — PATIENT MESSAGE (OUTPATIENT)
Dept: INTERNAL MEDICINE | Facility: CLINIC | Age: 72
End: 2022-06-13
Payer: MEDICARE

## 2022-06-13 VITALS
DIASTOLIC BLOOD PRESSURE: 70 MMHG | BODY MASS INDEX: 43.4 KG/M2 | HEART RATE: 100 BPM | SYSTOLIC BLOOD PRESSURE: 104 MMHG | OXYGEN SATURATION: 99 % | WEIGHT: 293 LBS | HEIGHT: 69 IN

## 2022-06-13 DIAGNOSIS — M54.50 LUMBAR PAIN: ICD-10-CM

## 2022-06-13 DIAGNOSIS — I50.32 CHRONIC DIASTOLIC CONGESTIVE HEART FAILURE: ICD-10-CM

## 2022-06-13 DIAGNOSIS — R07.9 CHEST PAIN, UNSPECIFIED TYPE: ICD-10-CM

## 2022-06-13 DIAGNOSIS — M54.2 NECK PAIN: ICD-10-CM

## 2022-06-13 DIAGNOSIS — R07.9 CHEST PAIN: ICD-10-CM

## 2022-06-13 DIAGNOSIS — I71.40 ABDOMINAL AORTIC ANEURYSM (AAA) WITHOUT RUPTURE: ICD-10-CM

## 2022-06-13 DIAGNOSIS — I10 PRIMARY HYPERTENSION: ICD-10-CM

## 2022-06-13 DIAGNOSIS — Z86.73 HISTORY OF CVA (CEREBROVASCULAR ACCIDENT): ICD-10-CM

## 2022-06-13 DIAGNOSIS — R51.9 HEADACHE: ICD-10-CM

## 2022-06-13 DIAGNOSIS — F41.9 ANXIETY: ICD-10-CM

## 2022-06-13 DIAGNOSIS — R53.1 RIGHT SIDED WEAKNESS: ICD-10-CM

## 2022-06-13 DIAGNOSIS — C50.411 MALIGNANT NEOPLASM OF UPPER-OUTER QUADRANT OF RIGHT BREAST IN FEMALE, ESTROGEN RECEPTOR POSITIVE: ICD-10-CM

## 2022-06-13 DIAGNOSIS — E66.01 MORBID OBESITY WITH BMI OF 45.0-49.9, ADULT: ICD-10-CM

## 2022-06-13 DIAGNOSIS — I26.99 RECURRENT PULMONARY EMBOLI: ICD-10-CM

## 2022-06-13 DIAGNOSIS — N17.9 AKI (ACUTE KIDNEY INJURY): Primary | ICD-10-CM

## 2022-06-13 DIAGNOSIS — I10 HYPERTENSION, UNSPECIFIED TYPE: ICD-10-CM

## 2022-06-13 DIAGNOSIS — E78.5 HYPERLIPIDEMIA, UNSPECIFIED HYPERLIPIDEMIA TYPE: ICD-10-CM

## 2022-06-13 DIAGNOSIS — G47.33 OSA (OBSTRUCTIVE SLEEP APNEA): Chronic | ICD-10-CM

## 2022-06-13 DIAGNOSIS — F32.1 MAJOR DEPRESSIVE DISORDER, SINGLE EPISODE, MODERATE WITH ANXIOUS DISTRESS: ICD-10-CM

## 2022-06-13 DIAGNOSIS — Z17.0 MALIGNANT NEOPLASM OF UPPER-OUTER QUADRANT OF RIGHT BREAST IN FEMALE, ESTROGEN RECEPTOR POSITIVE: ICD-10-CM

## 2022-06-13 DIAGNOSIS — R06.02 SOB (SHORTNESS OF BREATH): Primary | ICD-10-CM

## 2022-06-13 LAB
ALBUMIN SERPL BCP-MCNC: 3.3 G/DL (ref 3.5–5.2)
ALP SERPL-CCNC: 114 U/L (ref 55–135)
ALT SERPL W/O P-5'-P-CCNC: 9 U/L (ref 10–44)
ANION GAP SERPL CALC-SCNC: 12 MMOL/L (ref 8–16)
AST SERPL-CCNC: 13 U/L (ref 10–40)
BASOPHILS # BLD AUTO: 0.07 K/UL (ref 0–0.2)
BASOPHILS NFR BLD: 0.8 % (ref 0–1.9)
BILIRUB SERPL-MCNC: 0.3 MG/DL (ref 0.1–1)
BNP SERPL-MCNC: 13 PG/ML (ref 0–99)
BUN SERPL-MCNC: 32 MG/DL (ref 8–23)
CALCIUM SERPL-MCNC: 9.3 MG/DL (ref 8.7–10.5)
CHLORIDE SERPL-SCNC: 105 MMOL/L (ref 95–110)
CK SERPL-CCNC: 34 U/L (ref 20–180)
CO2 SERPL-SCNC: 23 MMOL/L (ref 23–29)
CREAT SERPL-MCNC: 2.1 MG/DL (ref 0.5–1.4)
DIFFERENTIAL METHOD: ABNORMAL
EOSINOPHIL # BLD AUTO: 0.1 K/UL (ref 0–0.5)
EOSINOPHIL NFR BLD: 1.3 % (ref 0–8)
ERYTHROCYTE [DISTWIDTH] IN BLOOD BY AUTOMATED COUNT: 16.1 % (ref 11.5–14.5)
EST. GFR  (AFRICAN AMERICAN): 27 ML/MIN/1.73 M^2
EST. GFR  (NON AFRICAN AMERICAN): 23 ML/MIN/1.73 M^2
GLUCOSE SERPL-MCNC: 134 MG/DL (ref 70–110)
HCT VFR BLD AUTO: 36.7 % (ref 37–48.5)
HGB BLD-MCNC: 11.2 G/DL (ref 12–16)
IMM GRANULOCYTES # BLD AUTO: 0.02 K/UL (ref 0–0.04)
IMM GRANULOCYTES NFR BLD AUTO: 0.2 % (ref 0–0.5)
LYMPHOCYTES # BLD AUTO: 2.7 K/UL (ref 1–4.8)
LYMPHOCYTES NFR BLD: 29 % (ref 18–48)
MCH RBC QN AUTO: 24.2 PG (ref 27–31)
MCHC RBC AUTO-ENTMCNC: 30.5 G/DL (ref 32–36)
MCV RBC AUTO: 79 FL (ref 82–98)
MONOCYTES # BLD AUTO: 0.7 K/UL (ref 0.3–1)
MONOCYTES NFR BLD: 7.3 % (ref 4–15)
NEUTROPHILS # BLD AUTO: 5.7 K/UL (ref 1.8–7.7)
NEUTROPHILS NFR BLD: 61.4 % (ref 38–73)
NRBC BLD-RTO: 0 /100 WBC
PLATELET # BLD AUTO: 285 K/UL (ref 150–450)
PMV BLD AUTO: 9.8 FL (ref 9.2–12.9)
POTASSIUM SERPL-SCNC: 4.4 MMOL/L (ref 3.5–5.1)
PROT SERPL-MCNC: 7.8 G/DL (ref 6–8.4)
RBC # BLD AUTO: 4.62 M/UL (ref 4–5.4)
SODIUM SERPL-SCNC: 140 MMOL/L (ref 136–145)
TROPONIN I SERPL DL<=0.01 NG/ML-MCNC: 0.02 NG/ML (ref 0–0.03)
TROPONIN I SERPL DL<=0.01 NG/ML-MCNC: 0.02 NG/ML (ref 0–0.03)
WBC # BLD AUTO: 9.21 K/UL (ref 3.9–12.7)

## 2022-06-13 PROCEDURE — 3008F PR BODY MASS INDEX (BMI) DOCUMENTED: ICD-10-PCS | Mod: CPTII,S$GLB,, | Performed by: STUDENT IN AN ORGANIZED HEALTH CARE EDUCATION/TRAINING PROGRAM

## 2022-06-13 PROCEDURE — 99285 EMERGENCY DEPT VISIT HI MDM: CPT | Mod: 25

## 2022-06-13 PROCEDURE — 25000003 PHARM REV CODE 250: Performed by: EMERGENCY MEDICINE

## 2022-06-13 PROCEDURE — 99214 OFFICE O/P EST MOD 30 MIN: CPT | Mod: 25,S$GLB,, | Performed by: STUDENT IN AN ORGANIZED HEALTH CARE EDUCATION/TRAINING PROGRAM

## 2022-06-13 PROCEDURE — U0002 COVID-19 LAB TEST NON-CDC: HCPCS | Performed by: EMERGENCY MEDICINE

## 2022-06-13 PROCEDURE — G0378 HOSPITAL OBSERVATION PER HR: HCPCS

## 2022-06-13 PROCEDURE — 99999 PR PBB SHADOW E&M-EST. PATIENT-LVL V: CPT | Mod: PBBFAC,,, | Performed by: STUDENT IN AN ORGANIZED HEALTH CARE EDUCATION/TRAINING PROGRAM

## 2022-06-13 PROCEDURE — 4010F ACE/ARB THERAPY RXD/TAKEN: CPT | Mod: CPTII,S$GLB,, | Performed by: STUDENT IN AN ORGANIZED HEALTH CARE EDUCATION/TRAINING PROGRAM

## 2022-06-13 PROCEDURE — 1125F PR PAIN SEVERITY QUANTIFIED, PAIN PRESENT: ICD-10-PCS | Mod: CPTII,S$GLB,, | Performed by: STUDENT IN AN ORGANIZED HEALTH CARE EDUCATION/TRAINING PROGRAM

## 2022-06-13 PROCEDURE — 3008F BODY MASS INDEX DOCD: CPT | Mod: CPTII,S$GLB,, | Performed by: STUDENT IN AN ORGANIZED HEALTH CARE EDUCATION/TRAINING PROGRAM

## 2022-06-13 PROCEDURE — 84484 ASSAY OF TROPONIN QUANT: CPT | Performed by: NURSE PRACTITIONER

## 2022-06-13 PROCEDURE — 93010 ELECTROCARDIOGRAM REPORT: CPT | Mod: ,,, | Performed by: STUDENT IN AN ORGANIZED HEALTH CARE EDUCATION/TRAINING PROGRAM

## 2022-06-13 PROCEDURE — 3074F SYST BP LT 130 MM HG: CPT | Mod: CPTII,S$GLB,, | Performed by: STUDENT IN AN ORGANIZED HEALTH CARE EDUCATION/TRAINING PROGRAM

## 2022-06-13 PROCEDURE — 80053 COMPREHEN METABOLIC PANEL: CPT | Performed by: NURSE PRACTITIONER

## 2022-06-13 PROCEDURE — 84484 ASSAY OF TROPONIN QUANT: CPT | Mod: 91 | Performed by: EMERGENCY MEDICINE

## 2022-06-13 PROCEDURE — 63600175 PHARM REV CODE 636 W HCPCS: Performed by: EMERGENCY MEDICINE

## 2022-06-13 PROCEDURE — 93010 EKG 12-LEAD: ICD-10-PCS | Mod: ,,, | Performed by: STUDENT IN AN ORGANIZED HEALTH CARE EDUCATION/TRAINING PROGRAM

## 2022-06-13 PROCEDURE — 3078F PR MOST RECENT DIASTOLIC BLOOD PRESSURE < 80 MM HG: ICD-10-PCS | Mod: CPTII,S$GLB,, | Performed by: STUDENT IN AN ORGANIZED HEALTH CARE EDUCATION/TRAINING PROGRAM

## 2022-06-13 PROCEDURE — 83880 ASSAY OF NATRIURETIC PEPTIDE: CPT | Performed by: NURSE PRACTITIONER

## 2022-06-13 PROCEDURE — 82550 ASSAY OF CK (CPK): CPT | Performed by: EMERGENCY MEDICINE

## 2022-06-13 PROCEDURE — 85025 COMPLETE CBC W/AUTO DIFF WBC: CPT | Performed by: NURSE PRACTITIONER

## 2022-06-13 PROCEDURE — 4010F PR ACE/ARB THEARPY RXD/TAKEN: ICD-10-PCS | Mod: CPTII,S$GLB,, | Performed by: STUDENT IN AN ORGANIZED HEALTH CARE EDUCATION/TRAINING PROGRAM

## 2022-06-13 PROCEDURE — 99999 PR PBB SHADOW E&M-EST. PATIENT-LVL V: ICD-10-PCS | Mod: PBBFAC,,, | Performed by: STUDENT IN AN ORGANIZED HEALTH CARE EDUCATION/TRAINING PROGRAM

## 2022-06-13 PROCEDURE — 1159F MED LIST DOCD IN RCRD: CPT | Mod: CPTII,S$GLB,, | Performed by: STUDENT IN AN ORGANIZED HEALTH CARE EDUCATION/TRAINING PROGRAM

## 2022-06-13 PROCEDURE — 1125F AMNT PAIN NOTED PAIN PRSNT: CPT | Mod: CPTII,S$GLB,, | Performed by: STUDENT IN AN ORGANIZED HEALTH CARE EDUCATION/TRAINING PROGRAM

## 2022-06-13 PROCEDURE — 3078F DIAST BP <80 MM HG: CPT | Mod: CPTII,S$GLB,, | Performed by: STUDENT IN AN ORGANIZED HEALTH CARE EDUCATION/TRAINING PROGRAM

## 2022-06-13 PROCEDURE — 96374 THER/PROPH/DIAG INJ IV PUSH: CPT

## 2022-06-13 PROCEDURE — 1159F PR MEDICATION LIST DOCUMENTED IN MEDICAL RECORD: ICD-10-PCS | Mod: CPTII,S$GLB,, | Performed by: STUDENT IN AN ORGANIZED HEALTH CARE EDUCATION/TRAINING PROGRAM

## 2022-06-13 PROCEDURE — 3074F PR MOST RECENT SYSTOLIC BLOOD PRESSURE < 130 MM HG: ICD-10-PCS | Mod: CPTII,S$GLB,, | Performed by: STUDENT IN AN ORGANIZED HEALTH CARE EDUCATION/TRAINING PROGRAM

## 2022-06-13 PROCEDURE — 96361 HYDRATE IV INFUSION ADD-ON: CPT

## 2022-06-13 PROCEDURE — 99214 PR OFFICE/OUTPT VISIT, EST, LEVL IV, 30-39 MIN: ICD-10-PCS | Mod: 25,S$GLB,, | Performed by: STUDENT IN AN ORGANIZED HEALTH CARE EDUCATION/TRAINING PROGRAM

## 2022-06-13 PROCEDURE — 93005 ELECTROCARDIOGRAM TRACING: CPT

## 2022-06-13 RX ORDER — KETOROLAC TROMETHAMINE 30 MG/ML
15 INJECTION, SOLUTION INTRAMUSCULAR; INTRAVENOUS
Status: COMPLETED | OUTPATIENT
Start: 2022-06-13 | End: 2022-06-13

## 2022-06-13 RX ORDER — NAPROXEN SODIUM 220 MG/1
162 TABLET, FILM COATED ORAL
Status: COMPLETED | OUTPATIENT
Start: 2022-06-13 | End: 2022-06-13

## 2022-06-13 RX ADMIN — KETOROLAC TROMETHAMINE 15 MG: 30 INJECTION, SOLUTION INTRAMUSCULAR at 08:06

## 2022-06-13 RX ADMIN — SODIUM CHLORIDE 1000 ML: 0.9 INJECTION, SOLUTION INTRAVENOUS at 05:06

## 2022-06-13 RX ADMIN — ASPIRIN 81 MG CHEWABLE TABLET 162 MG: 81 TABLET CHEWABLE at 05:06

## 2022-06-13 RX ADMIN — SODIUM CHLORIDE 1000 ML: 0.9 INJECTION, SOLUTION INTRAVENOUS at 08:06

## 2022-06-13 NOTE — PROGRESS NOTES
Subjective:   Patient ID:  Susu Luther is a 72 y.o. female who presents for follow up of Chest Pain and Shortness of Breath    12/1/20  69 yo female, care establish. Prior cardiologist Dr ackerman   [facility] HTN, CVA (2009), Right breast CA, Lumpectomy 3/2019, h/o PE off OAC 2 yrs ago.  AAA, s/p transcutaneous patch by Dr. Bonds, obesity knee OA imbalanced walker dependent  C/o SOB after walking few steps and dizziness. Had vision issue 1 month ago due to uncontrolled HTN  No chest pain  Sleeps with 2 pillows  Decent appetites  Leg calf pain worse at night  No smoking/drinking  ekg today NSR LVH.    ECH normal EF, grade II DD, LAE and PAP 56 mmHG   Chest CTA negative for PE  BP high    A1c controlled    S/p Open subpectoral lymph node biopsy on the right on 12/02/2020 by Dr. Starks  Still right chest, under arm and shoudler supersensitive pain      Shortness of Breath  Associated symptoms include chest pain. Pertinent negatives include no abdominal pain, claudication, fever, headaches, leg swelling, neck pain, orthopnea, PND, rash, sputum production, syncope, vomiting or wheezing.   Chest Pain   Associated symptoms include shortness of breath. Pertinent negatives include no abdominal pain, back pain, claudication, cough, diaphoresis, dizziness, fever, headaches, irregular heartbeat, malaise/fatigue, nausea, near-syncope, numbness, orthopnea, palpitations, PND, sputum production, syncope, vomiting or weakness.   Her past medical history is significant for CHF.   Pertinent negatives for past medical history include no seizures.   Congestive Heart Failure  Associated symptoms include chest pain and shortness of breath. Pertinent negatives include no abdominal pain, claudication, near-syncope or palpitations.     2/28/22  Patient was admitted to [facility] for shortness of breath.  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  Started on heparin drip  in transition to oral anticoagulation, now on Eliquis.  Recurrent PE.  On Femara. Has another lump in breast on right side, recently seen on PET scan.   Due to TANNER, was told to stop hctz, telmisartan.  She has ran out of clonidine. Does not take the ASA, hydralazine, amlodipine.   Blood pressure normotensive.  Reports severe headaches.  Has been out of Fioricet.  Echocardiogram with normal EF  Reports shortness of breath, chest heaviness mostly right-sided.      3/14/22  Still having SOB due to PE.  No bleeding issues while on eliquis.  Chest pain is substernal to right sided.   Lasix doesn't seem to help.   A reports intermittent leg pain right > left.  DVT ultrasound in-hospital with no evidence of DVT.  Following Hematology-Oncology.  Patient does not want surgery if needed for possible breast cancer.  Denies syncope, fever, chills.         4/18/22  Comes in with daughter who lives in [place].  Concerned that she may oxygen issues and may need home oxygen. O2 98%  Reports LE swelling and SOB  Reports chest pain comes on with SOB, did not have chest pain prior to PE  Has not been on lasix, has been held  Reports balancing issues- can do PT once symptoms improve   Denies syncope, fever, chills.       5/16/22  Has been feeling weak last couple days  Feels chest tightness with walking, also CURIEL- feels worse than before. 97% O2 in clinic   Reports dizziness after taking medications   BP low today   Says recurrent cancer may be spreading   No bleeding on eliquis   Reports orthopnea, PND.  Not feeling well- Does not want to the hospital     Denies syncope.      6/13/22  Not feeling well today  Reports chest pain and SOB worsening over the last 2 weeks   Ddimer trending, downTroponin neg and BNP nl on 5/16/22  EKG today without significant abnormalities   Reports recent diagnosis of breast cancer is progressing  Reports right-sided arm weakness  Patient has been increasingly stressed    Denies syncope, lower extremity  swelling.          EKG 6/13/22 NSR, LAD, LVH, 93 bpm, qtc 455 ms  EKG 5/16/22 SB, incomplete RBBB, LVH, septal infarct, qtc 422 ms   Echo 2/28/22  · The left ventricle is normal in size with concentric hypertrophy and normal systolic function.  · The estimated ejection fraction is 60%.  · Normal left ventricular diastolic function.  · Normal right ventricular size with normal right ventricular systolic function.  · Mild to moderate tricuspid regurgitation.  · Normal central venous pressure (3 mmHg).  · The estimated PA systolic pressure is 36 mmHg.       Echo 2019  CONCLUSIONS     1 - Mild left atrial enlargement.     2 - Concentric hypertrophy.     3 - No wall motion abnormalities.     4 - Normal left ventricular systolic function (EF 60-65%).     5 - Impaired LV relaxation, elevated LAP (grade 2 diastolic dysfunction).     6 - Normal right ventricular systolic function .     7 - The estimated PA systolic pressure is 36 mmHg.     8 - Mild tricuspid regurgitation.        Past Medical History:   Diagnosis Date    Chronic diastolic congestive heart failure 8/25/2020    Encounter for blood transfusion     History of repair of aneurysm of abdominal aorta using endovascular stent graft     DR Bonds    Hx of psychiatric care     Hypertension     Major depressive disorder, single episode, moderate with anxious distress 1/14/2020    Malignant neoplasm of upper-outer quadrant of right breast in female, estrogen receptor positive 3/14/2019    radiation    Psychiatric problem     Sleep apnea     Sleep difficulties     Stroke 2009    no residual defect    Therapy        Past Surgical History:   Procedure Laterality Date    APPENDECTOMY      AXILLARY NODE DISSECTION Right 12/02/2020    Procedure: LYMPHADENECTOMY, AXILLARY;  Surgeon: Artemio Starks MD;  Location: [facility];  Service: General;  Laterality: Right;    BIOPSY OF AXILLARY NODE Right 03/02/2021    Procedure: BIOPSY, LYMPH NODE, AXILLARY;  Surgeon:  Artemio Sutton MD;  Location: [address];  Service: General;  Laterality: Right;    BREAST BIOPSY      2019    BREAST LUMPECTOMY      2019     SECTION      x 1    OOPHORECTOMY      right     SENTINEL LYMPH NODE BIOPSY Right 2019    Procedure: BIOPSY, LYMPH NODE, SENTINEL;  Surgeon: Artemio Starks MD;  Location[facility];  Service: General;  Laterality: Right;    splenic artery aneurysm repair      TONSILLECTOMY         Social History     Tobacco Use    Smoking status: Never Smoker    Smokeless tobacco: Never Used   Substance Use Topics    Alcohol use: No    Drug use: No       Family History   Problem Relation Age of Onset    Diabetes Maternal Grandmother     Diabetes Maternal Grandfather     Diabetes Mother     Hypertension Mother     Sickle cell anemia Daughter     Breast cancer Neg Hx     Colon cancer Neg Hx     Ovarian cancer Neg Hx          Review of Systems   Constitutional: Negative for decreased appetite, diaphoresis, fever, malaise/fatigue and night sweats.   HENT: Negative for nosebleeds.    Eyes: Negative for blurred vision and double vision.   Cardiovascular: Positive for chest pain and dyspnea on exertion. Negative for claudication, irregular heartbeat, leg swelling, near-syncope, orthopnea, palpitations, paroxysmal nocturnal dyspnea and syncope.   Respiratory: Positive for shortness of breath. Negative for cough, sleep disturbances due to breathing, snoring, sputum production and wheezing.    Endocrine: Negative for cold intolerance and polyuria.   Hematologic/Lymphatic: Does not bruise/bleed easily.   Skin: Negative for rash.   Musculoskeletal: Negative for back pain, falls, joint pain, joint swelling and neck pain.        Right chest breast and shoulder pain   Gastrointestinal: Negative for abdominal pain, heartburn, nausea and vomiting.   Genitourinary: Negative for dysuria, frequency and hematuria.   Neurological: Negative for difficulty with  "concentration, dizziness, focal weakness, headaches, light-headedness, numbness, seizures and weakness.   Psychiatric/Behavioral: Negative for depression, memory loss and substance abuse. The patient does not have insomnia.    Allergic/Immunologic: Negative for HIV exposure and hives.     Vitals:    06/13/22 1418   BP: 104/70   BP Location: Right arm   Patient Position: Sitting   BP Method: Large (Manual)   Pulse: 100   SpO2: 99%   Weight: (!) 141.1 kg (311 lb 1.1 oz)   Height: 5' 9" (1.753 m)         Objective:   Physical Exam  Constitutional:       Comments: Mild distress.    HENT:      Head: Normocephalic.   Eyes:      Pupils: Pupils are equal, round, and reactive to light.   Neck:      Thyroid: No thyromegaly.      Vascular: Normal carotid pulses. No carotid bruit or JVD.   Cardiovascular:      Rate and Rhythm: Normal rate and regular rhythm.  No extrasystoles are present.     Chest Wall: PMI is not displaced.      Pulses: Normal pulses.      Heart sounds: Normal heart sounds. No murmur heard.    No gallop. No S3 sounds.   Pulmonary:      Effort: No respiratory distress.      Breath sounds: Normal breath sounds. No stridor.   Abdominal:      General: Bowel sounds are normal.      Palpations: Abdomen is soft.      Tenderness: There is no abdominal tenderness. There is no rebound.   Musculoskeletal:         General: Normal range of motion.   Skin:     Findings: No rash.   Neurological:      Mental Status: She is alert and oriented to person, place, and time.   Psychiatric:         Behavior: Behavior normal.         Lab Results   Component Value Date    CHOL 229 (H) 04/18/2022    CHOL 221 (H) 10/19/2020    CHOL 231 (H) 08/25/2020     Lab Results   Component Value Date    HDL 45 04/18/2022    HDL 55 10/19/2020    HDL 49 08/25/2020     Lab Results   Component Value Date    LDLCALC 150.4 04/18/2022    LDLCALC 137.4 10/19/2020    LDLCALC 135.6 08/25/2020     Lab Results   Component Value Date    TRIG 168 (H) 04/18/2022 "    TRIG 143 10/19/2020    TRIG 232 (H) 08/25/2020     Lab Results   Component Value Date    CHOLHDL 19.7 (L) 04/18/2022    CHOLHDL 24.9 10/19/2020    CHOLHDL 21.2 08/25/2020       Chemistry        Component Value Date/Time     06/06/2022 1149    K 4.5 06/06/2022 1149     06/06/2022 1149    CO2 24 06/06/2022 1149    BUN 18 06/06/2022 1149    CREATININE 1.5 (H) 06/06/2022 1149     (H) 06/06/2022 1149        Component Value Date/Time    CALCIUM 9.4 06/06/2022 1149    ALKPHOS 130 06/06/2022 1149    AST 11 06/06/2022 1149    ALT 10 06/06/2022 1149    BILITOT 0.3 06/06/2022 1149    ESTGFRAFRICA 40 (A) 06/06/2022 1149    EGFRNONAA 35 (A) 06/06/2022 1149          Lab Results   Component Value Date    HGBA1C 5.9 (H) 05/18/2019     Lab Results   Component Value Date    TSH 1.460 10/18/2021     Lab Results   Component Value Date    INR 0.9 02/15/2022    INR 1.0 11/10/2020    INR 1.0 05/19/2019     Lab Results   Component Value Date    WBC 9.12 06/06/2022    HGB 11.6 (L) 06/06/2022    HCT 38.2 06/06/2022    MCV 81 (L) 06/06/2022     06/06/2022     BMP  Sodium   Date Value Ref Range Status   06/06/2022 140 136 - 145 mmol/L Final     Potassium   Date Value Ref Range Status   06/06/2022 4.5 3.5 - 5.1 mmol/L Final     Chloride   Date Value Ref Range Status   06/06/2022 107 95 - 110 mmol/L Final     CO2   Date Value Ref Range Status   06/06/2022 24 23 - 29 mmol/L Final     BUN   Date Value Ref Range Status   06/06/2022 18 8 - 23 mg/dL Final     Creatinine   Date Value Ref Range Status   06/06/2022 1.5 (H) 0.5 - 1.4 mg/dL Final     Calcium   Date Value Ref Range Status   06/06/2022 9.4 8.7 - 10.5 mg/dL Final     Anion Gap   Date Value Ref Range Status   06/06/2022 9 8 - 16 mmol/L Final     eGFR if    Date Value Ref Range Status   06/06/2022 40 (A) >60 mL/min/1.73 m^2 Final     eGFR if non    Date Value Ref Range Status   06/06/2022 35 (A) >60 mL/min/1.73 m^2 Final     Comment:      Calculation used to obtain the estimated glomerular filtration  rate (eGFR) is the CKD-EPI equation.        BNP  @LABRCNTIP(BNP,BNPTRIAGEBLO)@  @LABRCNTIP(troponini)@  CrCl cannot be calculated (Patient's most recent lab result is older than the maximum 7 days allowed.).  No results found in the last 24 hours.  No results found in the last 24 hours.  No results found in the last 24 hours.    Assessment:      1. SOB (shortness of breath)    2. Chest pain, unspecified type    3. History of CVA (cerebrovascular accident)    4. Hypertension, unspecified type    5. Recurrent pulmonary emboli    6. Chronic diastolic congestive heart failure    7. Morbid obesity with BMI of 45.0-49.9, adult    8. Abdominal aortic aneurysm (AAA) without rupture    9. Hyperlipidemia, unspecified hyperlipidemia type    10. Malignant neoplasm of upper-outer quadrant of right breast in female, estrogen receptor positive          Plan:     Chest pain/shortness of breath  Worsening of symptoms  Prior troponin, BNP wnl,  D-dimer improving  Recommend going to emergency room for evaluation  May need repeat CT PE scan/V/Q scan  Would recommend stress testing    History of right breast cancer  Enlarging lymph nodes that could be causing significant pain/discomfort  Follows with Hematology-Oncology    Diastolic heart failure  Echo with normal EF, mild-mod TR  BNP wnl last visit   Continue lasix 20 daily     Right leg pain- stable  DVT ultrasound recently without evidence of DVT   Normal ABIs    Recurrent pulmonary embolus   Recurrent PE as of 2/22  PCP to evaluate regarding home O2  Continue OAC    Hypertension  Hypotension   Hold antihypertensives- continue only telmisartan for now     History of CVA  Denies symptoms  Currently not on aspirin as she is taking Eliquis  Continue statin    HLD   as of 4/22  Continue statin    AAA s/p transcutaneous patch  Stable    Morbid obesity, BMI > 40  Low-salt, low-fat diet  Exercise as  tolerated      All labs and cardiac procedures reviewed.      Return to clinic in 1 month    Arlene Hobbs MD  Cardiology Staff

## 2022-06-13 NOTE — ED NOTES
Pt reports spasms in her L. Leg and feels that her speech is slurring. When asked to smile she was unable to. MD notified

## 2022-06-13 NOTE — FIRST PROVIDER EVALUATION
Medical screening exam completed.  I have conducted a focused provider triage encounter, findings are as follows:    Brief history of present illness:  72-year-old female presents emergency department for chest pain shortness of breath    There were no vitals filed for this visit.    Pertinent physical exam:  No acute distress    Brief workup plan:  Labs, EKG, imaging    Preliminary workup initiated; this workup will be continued and followed by the physician or advanced practice provider that is assigned to the patient when roomed.

## 2022-06-13 NOTE — ED PROVIDER NOTES
"SCRIBE #1 NOTE: I, Melany Crys, am scribing for, and in the presence of, Johnson Hoskins Jr., MD. I have scribed the entire note.       History     Chief Complaint   Patient presents with    Chest Pain     Intermittent chest pain x 2 days to middle of chest described as pressure.  Pt also reports SOB x 2 months for which she has been seen by Dr. Hobbs.  Pt reports CHF history.     Review of patient's allergies indicates:   Allergen Reactions    Pcn [penicillins] Hives         History of Present Illness     HPI    6/13/2022, 4:46 PM  History obtained from the patient      History of Present Illness: Susu Luther is a 72 y.o. female patient with a PMHx of chronic diastolic heart failure, HTN, stroke who presents to the Emergency Department for evaluation of right side chest pain which onset gradually 2 weeks ago. She states she has been seeing her cardiologist, Dr. Hobbs, every 2 weeks. The pt states the symptoms are come and go in severity. The pt states the pain is worse when she breaths. The pt describes her CP as an "elephant sitting on her chest." Associated sxs include dizziness, diaphoresis, SOB, midline back pain, sneezing. Patient denies any N/V/D, fever, neck pain, leg swelling, cough and all other sxs at this time. Prior Tx includes none. The pt states she takes eliquis due to the fact that she has a hx of DVT and PE. She reports that she has no hx of MI but states she has a hx of breast  No further complaints or concerns at this time.       Arrival mode: Personal vehicle      PCP: Peace Arias MD        Past Medical History:  Past Medical History:   Diagnosis Date    Chronic diastolic congestive heart failure 8/25/2020    Encounter for blood transfusion     History of repair of aneurysm of abdominal aorta using endovascular stent graft     DR Bonds    Hx of psychiatric care     Hypertension     Major depressive disorder, single episode, moderate with anxious distress 1/14/2020    " Malignant neoplasm of upper-outer quadrant of right breast in female, estrogen receptor positive 3/14/2019    radiation    Psychiatric problem     Sleep apnea     Sleep difficulties     Stroke 2009    no residual defect    Therapy        Past Surgical History:  Past Surgical History:   Procedure Laterality Date    APPENDECTOMY      AXILLARY NODE DISSECTION Right 2020    Procedure: LYMPHADENECTOMY, AXILLARY;  Surgeon: Artemio Starks MD;  Location: [facility];  Service: General;  Laterality: Right;    BIOPSY OF AXILLARY NODE Right 2021    Procedure: BIOPSY, LYMPH NODE, AXILLARY;  Surgeon: Artemio Sutton MD;  Location: [address];  Service: General;  Laterality: Right;    BREAST BIOPSY      2019    BREAST LUMPECTOMY      2019     SECTION      x 1    OOPHORECTOMY      right     SENTINEL LYMPH NODE BIOPSY Right 2019    Procedure: BIOPSY, LYMPH NODE, SENTINEL;  Surgeon: Artemio Starks MD;  Location: [facility];  Service: General;  Laterality: Right;    splenic artery aneurysm repair      TONSILLECTOMY           Family History:  Family History   Problem Relation Age of Onset    Diabetes Maternal Grandmother     Diabetes Maternal Grandfather     Diabetes Mother     Hypertension Mother     Sickle cell anemia Daughter     Breast cancer Neg Hx     Colon cancer Neg Hx     Ovarian cancer Neg Hx        Social History:  Social History     Tobacco Use    Smoking status: Never Smoker    Smokeless tobacco: Never Used   Substance and Sexual Activity    Alcohol use: No    Drug use: No    Sexual activity: Yes     Partners: Male     Birth control/protection: Post-menopausal        Review of Systems     Review of Systems   Constitutional: Positive for diaphoresis. Negative for fever.   HENT: Positive for sneezing. Negative for sore throat.    Respiratory: Positive for shortness of breath. Negative for cough.    Cardiovascular: Positive for chest pain (right side). Negative for  leg swelling.   Gastrointestinal: Negative for diarrhea, nausea and vomiting.   Genitourinary: Negative for dysuria.   Musculoskeletal: Positive for back pain. Negative for neck pain.   Skin: Negative for rash.   Neurological: Positive for dizziness. Negative for weakness.   Hematological: Does not bruise/bleed easily.   All other systems reviewed and are negative.     Physical Exam     Initial Vitals [06/13/22 1518]   BP Pulse Resp Temp SpO2   (!) 96/51 101 20 98.1 °F (36.7 °C) 95 %      MAP       --          Physical Exam  Nursing Notes and Vital Signs Reviewed.  Constitutional: Patient is in no acute distress.   Head: Atraumatic. Normocephalic.  Eyes: PERRL. EOM intact. Conjunctivae are not pale. No scleral icterus.  ENT: Mucous membranes are moist. Oropharynx is clear and symmetric.    Neck: Supple. Full ROM. No lymphadenopathy.  Cardiovascular: Regular rate. Regular rhythm. No murmurs, rubs, or gallops. Distal pulses are 2+ and symmetric.  Pulmonary/Chest: No respiratory distress. Clear to auscultation bilaterally. No wheezing or rales.  Abdominal: Soft and non-distended.  There is no tenderness.  No rebound, guarding, or rigidity. Good bowel sounds.  Genitourinary: No CVA tenderness  Musculoskeletal: Moves all extremities. No obvious deformities. No edema. No calf tenderness. Anterior chest wall tenderness on right side which reproduces pt chief complaint.   Skin: Warm and dry.  Neurological:  Alert, awake, and appropriate.  Normal speech.  No acute focal neurological deficits are appreciated. GCS 15.   Psychiatric: Normal affect. Good eye contact. Appropriate in content.     ED Course   Procedures  ED Vital Signs:  Vitals:    06/14/22 0523 06/14/22 0748 06/14/22 0803 06/14/22 0833   BP: (!) 140/83 (!) 155/77 128/70 123/62   Pulse: 67 76 81 71   Resp: 18 20  (!) 21   Temp: 98.8 °F (37.1 °C) 97.5 °F (36.4 °C)     TempSrc:       SpO2: 100% 98% 98% 99%   Weight:       Height:        06/14/22 0852 06/14/22 0903  "06/14/22 0918 06/14/22 0949   BP: 131/71 129/71 115/76 (!) 141/84   Pulse: 74 75 69 73   Resp:    18   Temp:    97.7 °F (36.5 °C)   TempSrc:    Oral   SpO2:  98% 97% 97%   Weight:       Height:        06/14/22 1107 06/14/22 1218 06/14/22 1323 06/14/22 1512   BP: 129/81   138/77   Pulse: 71   (!) 59   Resp: 16  18 16   Temp: 98.1 °F (36.7 °C)   97.7 °F (36.5 °C)   TempSrc: Oral   Oral   SpO2: (!) 94%   100%   Weight:  (!) 148.3 kg (326 lb 15.1 oz)     Height:  5' 9" (1.753 m)      06/14/22 1947 06/14/22 2017 06/14/22 2020   BP: 138/67 (!) 143/78    Pulse: (!) 54 (!) 54 (!) 57   Resp: 18  18   Temp: 98 °F (36.7 °C)     TempSrc: Oral     SpO2: 98%  99%   Weight:      Height:          Abnormal Lab Results:  Labs Reviewed   CBC W/ AUTO DIFFERENTIAL - Abnormal; Notable for the following components:       Result Value    Hemoglobin 11.2 (*)     Hematocrit 36.7 (*)     MCV 79 (*)     MCH 24.2 (*)     MCHC 30.5 (*)     RDW 16.1 (*)     All other components within normal limits   COMPREHENSIVE METABOLIC PANEL - Abnormal; Notable for the following components:    Glucose 134 (*)     BUN 32 (*)     Creatinine 2.1 (*)     Albumin 3.3 (*)     ALT 9 (*)     eGFR if  27 (*)     eGFR if non  23 (*)     All other components within normal limits   RENAL FUNCTION PANEL - Abnormal; Notable for the following components:    Glucose 114 (*)     BUN 26 (*)     Albumin 2.8 (*)     eGFR if  44 (*)     eGFR if non  38 (*)     All other components within normal limits   CBC W/ AUTO DIFFERENTIAL - Abnormal; Notable for the following components:    Hemoglobin 10.3 (*)     Hematocrit 33.8 (*)     MCV 79 (*)     MCH 24.2 (*)     MCHC 30.5 (*)     RDW 16.2 (*)     All other components within normal limits   TROPONIN I   B-TYPE NATRIURETIC PEPTIDE   CK   TROPONIN I   SARS-COV-2 RNA AMPLIFICATION, QUAL   TROPONIN I        All Lab Results:  Results for orders placed or performed during the " hospital encounter of 06/13/22   CBC auto differential   Result Value Ref Range    WBC 9.21 3.90 - 12.70 K/uL    RBC 4.62 4.00 - 5.40 M/uL    Hemoglobin 11.2 (L) 12.0 - 16.0 g/dL    Hematocrit 36.7 (L) 37.0 - 48.5 %    MCV 79 (L) 82 - 98 fL    MCH 24.2 (L) 27.0 - 31.0 pg    MCHC 30.5 (L) 32.0 - 36.0 g/dL    RDW 16.1 (H) 11.5 - 14.5 %    Platelets 285 150 - 450 K/uL    MPV 9.8 9.2 - 12.9 fL    Immature Granulocytes 0.2 0.0 - 0.5 %    Gran # (ANC) 5.7 1.8 - 7.7 K/uL    Immature Grans (Abs) 0.02 0.00 - 0.04 K/uL    Lymph # 2.7 1.0 - 4.8 K/uL    Mono # 0.7 0.3 - 1.0 K/uL    Eos # 0.1 0.0 - 0.5 K/uL    Baso # 0.07 0.00 - 0.20 K/uL    nRBC 0 0 /100 WBC    Gran % 61.4 38.0 - 73.0 %    Lymph % 29.0 18.0 - 48.0 %    Mono % 7.3 4.0 - 15.0 %    Eosinophil % 1.3 0.0 - 8.0 %    Basophil % 0.8 0.0 - 1.9 %    Differential Method Automated    Comprehensive metabolic panel   Result Value Ref Range    Sodium 140 136 - 145 mmol/L    Potassium 4.4 3.5 - 5.1 mmol/L    Chloride 105 95 - 110 mmol/L    CO2 23 23 - 29 mmol/L    Glucose 134 (H) 70 - 110 mg/dL    BUN 32 (H) 8 - 23 mg/dL    Creatinine 2.1 (H) 0.5 - 1.4 mg/dL    Calcium 9.3 8.7 - 10.5 mg/dL    Total Protein 7.8 6.0 - 8.4 g/dL    Albumin 3.3 (L) 3.5 - 5.2 g/dL    Total Bilirubin 0.3 0.1 - 1.0 mg/dL    Alkaline Phosphatase 114 55 - 135 U/L    AST 13 10 - 40 U/L    ALT 9 (L) 10 - 44 U/L    Anion Gap 12 8 - 16 mmol/L    eGFR if African American 27 (A) >60 mL/min/1.73 m^2    eGFR if non African American 23 (A) >60 mL/min/1.73 m^2   Troponin I #1   Result Value Ref Range    Troponin I 0.021 0.000 - 0.026 ng/mL   BNP   Result Value Ref Range    BNP 13 0 - 99 pg/mL   CK   Result Value Ref Range    CPK 34 20 - 180 U/L   Troponin I   Result Value Ref Range    Troponin I 0.017 0.000 - 0.026 ng/mL   COVID-19 Rapid Screening   Result Value Ref Range    SARS-CoV-2 RNA, Amplification, Qual Negative Negative   Renal Function Panel   Result Value Ref Range    Glucose 114 (H) 70 - 110 mg/dL     Sodium 141 136 - 145 mmol/L    Potassium 3.9 3.5 - 5.1 mmol/L    Chloride 109 95 - 110 mmol/L    CO2 23 23 - 29 mmol/L    BUN 26 (H) 8 - 23 mg/dL    Calcium 8.8 8.7 - 10.5 mg/dL    Creatinine 1.4 0.5 - 1.4 mg/dL    Albumin 2.8 (L) 3.5 - 5.2 g/dL    Phosphorus 3.6 2.7 - 4.5 mg/dL    eGFR if African American 44 (A) >60 mL/min/1.73 m^2    eGFR if non African American 38 (A) >60 mL/min/1.73 m^2    Anion Gap 9 8 - 16 mmol/L   CBC auto differential   Result Value Ref Range    WBC 8.84 3.90 - 12.70 K/uL    RBC 4.26 4.00 - 5.40 M/uL    Hemoglobin 10.3 (L) 12.0 - 16.0 g/dL    Hematocrit 33.8 (L) 37.0 - 48.5 %    MCV 79 (L) 82 - 98 fL    MCH 24.2 (L) 27.0 - 31.0 pg    MCHC 30.5 (L) 32.0 - 36.0 g/dL    RDW 16.2 (H) 11.5 - 14.5 %    Platelets 235 150 - 450 K/uL    MPV 9.5 9.2 - 12.9 fL    Immature Granulocytes 0.2 0.0 - 0.5 %    Gran # (ANC) 5.3 1.8 - 7.7 K/uL    Immature Grans (Abs) 0.02 0.00 - 0.04 K/uL    Lymph # 2.6 1.0 - 4.8 K/uL    Mono # 0.7 0.3 - 1.0 K/uL    Eos # 0.2 0.0 - 0.5 K/uL    Baso # 0.06 0.00 - 0.20 K/uL    nRBC 0 0 /100 WBC    Gran % 59.5 38.0 - 73.0 %    Lymph % 29.2 18.0 - 48.0 %    Mono % 8.4 4.0 - 15.0 %    Eosinophil % 2.0 0.0 - 8.0 %    Basophil % 0.7 0.0 - 1.9 %    Differential Method Automated    Troponin I   Result Value Ref Range    Troponin I 0.013 0.000 - 0.026 ng/mL   POCT glucose   Result Value Ref Range    POCT Glucose 105 70 - 110 mg/dL     *Note: Due to a large number of results and/or encounters for the requested time period, some results have not been displayed. A complete set of results can be found in Results Review.         Imaging Results:  Imaging Results          CT Head Without Contrast (Final result)  Result time 06/13/22 20:16:36    Final result by Edouard Gomez MD (06/13/22 20:16:36)                 Impression:      No hemorrhage or acute major vascular distribution infarction.  Clinical correlation and further evaluation as warranted.    ASPECT score 10 of 10.    All CT scans  at this facility are performed  using dose modulation techniques as appropriate to performed exam including the following:  automated exposure control; adjustment of mA and/or kV according to the patients size (this includes techniques or standardized protocols for targeted exams where dose is matched to indication/reason for exam: i.e. extremities or head);  iterative reconstruction technique.      Electronically signed by: Edouard Gomez  Date:    06/13/2022  Time:    20:16             Narrative:    EXAMINATION:  CT HEAD WITHOUT CONTRAST    CLINICAL HISTORY:  Neuro deficit, acute, stroke suspected; Headache, unspecified    TECHNIQUE:  Low dose axial CT images obtained throughout the head without intravenous contrast. Sagittal and coronal reconstructions were performed.    COMPARISON:  Multiple priors.    FINDINGS:  Intracranial compartment:    Ventricles and sulci are normal in size for age without evidence of hydrocephalus. No extra-axial blood or fluid collections.    The brain parenchyma appears normal. No parenchymal mass, hemorrhage, edema or major vascular distribution infarct.    Skull/extracranial contents (limited evaluation): No fracture. Mastoid air cells and paranasal sinuses are essentially clear.                               X-Ray Chest AP Portable (Final result)  Result time 06/13/22 15:50:22    Final result by Sander Gregory MD (06/13/22 15:50:22)                 Impression:      1.  Negative for acute process involving the chest.    2.  Stable and incidental findings as noted above.      Electronically signed by: Sander Gregory MD  Date:    06/13/2022  Time:    15:50             Narrative:    EXAMINATION:  XR CHEST AP PORTABLE    CLINICAL HISTORY:  Chest Pain;    COMPARISON:  Studies dating back to [place] [facility], 2019    FINDINGS:  The lungs are clear. The cardiac silhouette size is normal. The trachea is midline and the mediastinal width is normal. Negative for focal infiltrate, effusion or  pneumothorax. Pulmonary vasculature is normal. Negative for osseous abnormalities. Ectatic and tortuous aorta.  Degenerative changes of the spine and both shoulder girdles.  Elevation of the right hemidiaphragm.  There is retained contrast underneath the left hemidiaphragm, likely within the splenic flexure.                                 The EKG was ordered, reviewed, and independently interpreted by the ED provider.  Interpretation time: 15:18:15  Rate: 99 BPM  Rhythm: normal sinus rhythm  Interpretation: Left axis deviation. Moderate voltage criteria for LVH, may be normal variant ( R in aVL, Néstor product ). Nonspecific ST abnormality. No STEMI.             The Emergency Provider reviewed the vital signs and test results, which are outlined above.     ED Discussion     6:52 PM: Re-evaluated pt at bedside, pt states she is now having right side facial pain and bilateral muscle spasms of quadriceps. No acute focal neurological deficits noted upon re-evaluation.     11:25 PM: Spoke with pt at bedside, per pt report they were having right side weakness and slurring of speech. The pt states these symptoms are now improving.     11:13 PM: Discussed case with Leticia Yun NP ([facility]). Dr. Morales agrees with current care and management of pt and accepts admission.   Admitting Service: Hospital Medicine  Admitting Physician: Dr. Morales  Admit to: Obs    11:24 PM: Re-evaluated pt. I have discussed test results, shared treatment plan, and the need for admission with patient and family at bedside. Pt and family express understanding at this time and agree with all information. All questions answered. Pt and family have no further questions or concerns at this time. Pt is ready for admit.             Medical Decision Making:   Clinical Tests:   Lab Tests: Ordered and Reviewed  Radiological Study: Ordered and Reviewed  Medical Tests: Ordered and Reviewed           ED Medication(s):  Medications    ALPRAZolam tablet 0.5 mg (0.5 mg Oral Given 6/14/22 1626)   apixaban tablet 5 mg (5 mg Oral Given 6/14/22 2017)   atorvastatin tablet 40 mg (40 mg Oral Given 6/14/22 0852)   doxazosin tablet 4 mg (4 mg Oral Given 6/14/22 2021)   letrozole tablet 2.5 mg (2.5 mg Oral Given 6/14/22 1214)   oxyCODONE-acetaminophen 7.5-325 mg per tablet 1 tablet (1 tablet Oral Given 6/14/22 1323)   pregabalin capsule 75 mg (75 mg Oral Given 6/14/22 2017)   propranoloL tablet 40 mg (0 mg Oral Hold 6/14/22 2100)   venlafaxine 24 hr capsule 150 mg (150 mg Oral Given 6/14/22 0852)   sodium chloride 0.9% flush 10 mL (has no administration in time range)   melatonin tablet 6 mg (has no administration in time range)   ondansetron disintegrating tablet 8 mg (8 mg Oral Given 6/14/22 0752)   promethazine tablet 25 mg (has no administration in time range)   polyethylene glycol packet 17 g (17 g Oral Given 6/14/22 0852)   simethicone chewable tablet 80 mg (has no administration in time range)   aluminum-magnesium hydroxide-simethicone 200-200-20 mg/5 mL suspension 30 mL (has no administration in time range)   acetaminophen tablet 650 mg (has no administration in time range)   naloxone 0.4 mg/mL injection 0.02 mg (has no administration in time range)   glucose chewable tablet 16 g (has no administration in time range)   glucose chewable tablet 24 g (has no administration in time range)   glucagon (human recombinant) injection 1 mg (has no administration in time range)   dextrose 10% bolus 125 mL (has no administration in time range)   dextrose 10% bolus 250 mL (has no administration in time range)   aspirin chewable tablet 162 mg (162 mg Oral Given 6/13/22 1702)   sodium chloride 0.9% bolus 1,000 mL (0 mLs Intravenous Stopped 6/13/22 1803)   sodium chloride 0.9% bolus 1,000 mL (0 mLs Intravenous Stopped 6/13/22 2123)   ketorolac injection 15 mg (15 mg Intravenous Given 6/13/22 2054)   iohexoL (OMNIPAQUE 350) injection 75 mL (75 mLs Intravenous  Given 6/14/22 1251)       Current Discharge Medication List                  Scribe Attestation:   Scribe #1: I performed the above scribed service and the documentation accurately describes the services I performed. I attest to the accuracy of the note.     Attending:   Physician Attestation Statement for Scribe #1: I, Johnson Hoskins Jr., MD, personally performed the services described in this documentation, as scribed by Melany Spangler, in my presence, and it is both accurate and complete.           Clinical Impression       ICD-10-CM ICD-9-CM   1. Chest pain  R07.9 786.50   2. Headache  R51.9 784.0   3. Right sided weakness  R53.1 728.87       Disposition:   Disposition: Placed in Observation  Condition: Fair         Johnson Hoskins Jr., MD  06/14/22 9703

## 2022-06-13 NOTE — TELEPHONE ENCOUNTER
"Spoke with pt, [facility] sent us over a "refill" of diabetic supplies but pt is not a diabetic. She will call Gary to straighten this out.  "

## 2022-06-14 LAB
ALBUMIN SERPL BCP-MCNC: 2.8 G/DL (ref 3.5–5.2)
ANION GAP SERPL CALC-SCNC: 9 MMOL/L (ref 8–16)
BASOPHILS # BLD AUTO: 0.06 K/UL (ref 0–0.2)
BASOPHILS NFR BLD: 0.7 % (ref 0–1.9)
BUN SERPL-MCNC: 26 MG/DL (ref 8–23)
CALCIUM SERPL-MCNC: 8.8 MG/DL (ref 8.7–10.5)
CHLORIDE SERPL-SCNC: 109 MMOL/L (ref 95–110)
CO2 SERPL-SCNC: 23 MMOL/L (ref 23–29)
CREAT SERPL-MCNC: 1.4 MG/DL (ref 0.5–1.4)
DIFFERENTIAL METHOD: ABNORMAL
EOSINOPHIL # BLD AUTO: 0.2 K/UL (ref 0–0.5)
EOSINOPHIL NFR BLD: 2 % (ref 0–8)
ERYTHROCYTE [DISTWIDTH] IN BLOOD BY AUTOMATED COUNT: 16.2 % (ref 11.5–14.5)
EST. GFR  (AFRICAN AMERICAN): 44 ML/MIN/1.73 M^2
EST. GFR  (NON AFRICAN AMERICAN): 38 ML/MIN/1.73 M^2
GLUCOSE SERPL-MCNC: 114 MG/DL (ref 70–110)
HCT VFR BLD AUTO: 33.8 % (ref 37–48.5)
HGB BLD-MCNC: 10.3 G/DL (ref 12–16)
IMM GRANULOCYTES # BLD AUTO: 0.02 K/UL (ref 0–0.04)
IMM GRANULOCYTES NFR BLD AUTO: 0.2 % (ref 0–0.5)
LYMPHOCYTES # BLD AUTO: 2.6 K/UL (ref 1–4.8)
LYMPHOCYTES NFR BLD: 29.2 % (ref 18–48)
MCH RBC QN AUTO: 24.2 PG (ref 27–31)
MCHC RBC AUTO-ENTMCNC: 30.5 G/DL (ref 32–36)
MCV RBC AUTO: 79 FL (ref 82–98)
MONOCYTES # BLD AUTO: 0.7 K/UL (ref 0.3–1)
MONOCYTES NFR BLD: 8.4 % (ref 4–15)
NEUTROPHILS # BLD AUTO: 5.3 K/UL (ref 1.8–7.7)
NEUTROPHILS NFR BLD: 59.5 % (ref 38–73)
NRBC BLD-RTO: 0 /100 WBC
PHOSPHATE SERPL-MCNC: 3.6 MG/DL (ref 2.7–4.5)
PLATELET # BLD AUTO: 235 K/UL (ref 150–450)
PMV BLD AUTO: 9.5 FL (ref 9.2–12.9)
POCT GLUCOSE: 105 MG/DL (ref 70–110)
POTASSIUM SERPL-SCNC: 3.9 MMOL/L (ref 3.5–5.1)
RBC # BLD AUTO: 4.26 M/UL (ref 4–5.4)
SARS-COV-2 RDRP RESP QL NAA+PROBE: NEGATIVE
SODIUM SERPL-SCNC: 141 MMOL/L (ref 136–145)
TROPONIN I SERPL DL<=0.01 NG/ML-MCNC: 0.01 NG/ML (ref 0–0.03)
WBC # BLD AUTO: 8.84 K/UL (ref 3.9–12.7)

## 2022-06-14 PROCEDURE — G0378 HOSPITAL OBSERVATION PER HR: HCPCS

## 2022-06-14 PROCEDURE — 97162 PT EVAL MOD COMPLEX 30 MIN: CPT

## 2022-06-14 PROCEDURE — 94761 N-INVAS EAR/PLS OXIMETRY MLT: CPT

## 2022-06-14 PROCEDURE — 97166 OT EVAL MOD COMPLEX 45 MIN: CPT

## 2022-06-14 PROCEDURE — 85025 COMPLETE CBC W/AUTO DIFF WBC: CPT | Performed by: INTERNAL MEDICINE

## 2022-06-14 PROCEDURE — 97530 THERAPEUTIC ACTIVITIES: CPT

## 2022-06-14 PROCEDURE — 25500020 PHARM REV CODE 255: Performed by: INTERNAL MEDICINE

## 2022-06-14 PROCEDURE — 99900035 HC TECH TIME PER 15 MIN (STAT)

## 2022-06-14 PROCEDURE — 25000003 PHARM REV CODE 250: Performed by: INTERNAL MEDICINE

## 2022-06-14 PROCEDURE — 80069 RENAL FUNCTION PANEL: CPT | Performed by: INTERNAL MEDICINE

## 2022-06-14 PROCEDURE — 84484 ASSAY OF TROPONIN QUANT: CPT | Performed by: INTERNAL MEDICINE

## 2022-06-14 RX ORDER — IBUPROFEN 200 MG
24 TABLET ORAL
Status: DISCONTINUED | OUTPATIENT
Start: 2022-06-14 | End: 2022-06-15 | Stop reason: HOSPADM

## 2022-06-14 RX ORDER — VENLAFAXINE HYDROCHLORIDE 75 MG/1
150 CAPSULE, EXTENDED RELEASE ORAL DAILY
Status: DISCONTINUED | OUTPATIENT
Start: 2022-06-14 | End: 2022-06-15 | Stop reason: HOSPADM

## 2022-06-14 RX ORDER — ATORVASTATIN CALCIUM 40 MG/1
40 TABLET, FILM COATED ORAL DAILY
Status: DISCONTINUED | OUTPATIENT
Start: 2022-06-14 | End: 2022-06-15 | Stop reason: HOSPADM

## 2022-06-14 RX ORDER — HYDRALAZINE HYDROCHLORIDE 50 MG/1
100 TABLET, FILM COATED ORAL 2 TIMES DAILY
Status: DISCONTINUED | OUTPATIENT
Start: 2022-06-14 | End: 2022-06-14

## 2022-06-14 RX ORDER — PROPRANOLOL HYDROCHLORIDE 40 MG/1
40 TABLET ORAL 2 TIMES DAILY
Status: DISCONTINUED | OUTPATIENT
Start: 2022-06-14 | End: 2022-06-15 | Stop reason: HOSPADM

## 2022-06-14 RX ORDER — AMLODIPINE BESYLATE 10 MG/1
10 TABLET ORAL DAILY
Status: DISCONTINUED | OUTPATIENT
Start: 2022-06-14 | End: 2022-06-14

## 2022-06-14 RX ORDER — OXYCODONE AND ACETAMINOPHEN 7.5; 325 MG/1; MG/1
1 TABLET ORAL EVERY 8 HOURS PRN
Status: DISCONTINUED | OUTPATIENT
Start: 2022-06-14 | End: 2022-06-15 | Stop reason: HOSPADM

## 2022-06-14 RX ORDER — ALPRAZOLAM 0.5 MG/1
0.5 TABLET ORAL 2 TIMES DAILY PRN
Status: DISCONTINUED | OUTPATIENT
Start: 2022-06-14 | End: 2022-06-15 | Stop reason: HOSPADM

## 2022-06-14 RX ORDER — SIMETHICONE 80 MG
1 TABLET,CHEWABLE ORAL 4 TIMES DAILY PRN
Status: DISCONTINUED | OUTPATIENT
Start: 2022-06-14 | End: 2022-06-15 | Stop reason: HOSPADM

## 2022-06-14 RX ORDER — TALC
6 POWDER (GRAM) TOPICAL NIGHTLY PRN
Status: DISCONTINUED | OUTPATIENT
Start: 2022-06-14 | End: 2022-06-15 | Stop reason: HOSPADM

## 2022-06-14 RX ORDER — IBUPROFEN 200 MG
16 TABLET ORAL
Status: DISCONTINUED | OUTPATIENT
Start: 2022-06-14 | End: 2022-06-15 | Stop reason: HOSPADM

## 2022-06-14 RX ORDER — ONDANSETRON 8 MG/1
8 TABLET, ORALLY DISINTEGRATING ORAL EVERY 8 HOURS PRN
Status: DISCONTINUED | OUTPATIENT
Start: 2022-06-14 | End: 2022-06-15 | Stop reason: HOSPADM

## 2022-06-14 RX ORDER — PROMETHAZINE HYDROCHLORIDE 25 MG/1
25 TABLET ORAL EVERY 6 HOURS PRN
Status: DISCONTINUED | OUTPATIENT
Start: 2022-06-14 | End: 2022-06-15 | Stop reason: HOSPADM

## 2022-06-14 RX ORDER — NALOXONE HCL 0.4 MG/ML
0.02 VIAL (ML) INJECTION
Status: DISCONTINUED | OUTPATIENT
Start: 2022-06-14 | End: 2022-06-15 | Stop reason: HOSPADM

## 2022-06-14 RX ORDER — VENLAFAXINE HYDROCHLORIDE 150 MG/1
CAPSULE, EXTENDED RELEASE ORAL
Qty: 90 CAPSULE | Refills: 0 | Status: SHIPPED | OUTPATIENT
Start: 2022-06-14 | End: 2022-06-17 | Stop reason: SDUPTHER

## 2022-06-14 RX ORDER — MAG HYDROX/ALUMINUM HYD/SIMETH 200-200-20
30 SUSPENSION, ORAL (FINAL DOSE FORM) ORAL 4 TIMES DAILY PRN
Status: DISCONTINUED | OUTPATIENT
Start: 2022-06-14 | End: 2022-06-15 | Stop reason: HOSPADM

## 2022-06-14 RX ORDER — GLUCAGON 1 MG
1 KIT INJECTION
Status: DISCONTINUED | OUTPATIENT
Start: 2022-06-14 | End: 2022-06-15 | Stop reason: HOSPADM

## 2022-06-14 RX ORDER — SODIUM CHLORIDE 0.9 % (FLUSH) 0.9 %
10 SYRINGE (ML) INJECTION EVERY 8 HOURS PRN
Status: DISCONTINUED | OUTPATIENT
Start: 2022-06-14 | End: 2022-06-15 | Stop reason: HOSPADM

## 2022-06-14 RX ORDER — LETROZOLE 2.5 MG/1
2.5 TABLET, FILM COATED ORAL DAILY
Status: DISCONTINUED | OUTPATIENT
Start: 2022-06-14 | End: 2022-06-15 | Stop reason: HOSPADM

## 2022-06-14 RX ORDER — DOXAZOSIN 2 MG/1
4 TABLET ORAL NIGHTLY
Status: DISCONTINUED | OUTPATIENT
Start: 2022-06-14 | End: 2022-06-15 | Stop reason: HOSPADM

## 2022-06-14 RX ORDER — POLYETHYLENE GLYCOL 3350 17 G/17G
17 POWDER, FOR SOLUTION ORAL DAILY
Status: DISCONTINUED | OUTPATIENT
Start: 2022-06-14 | End: 2022-06-15 | Stop reason: HOSPADM

## 2022-06-14 RX ORDER — ACETAMINOPHEN 325 MG/1
650 TABLET ORAL EVERY 4 HOURS PRN
Status: DISCONTINUED | OUTPATIENT
Start: 2022-06-14 | End: 2022-06-15 | Stop reason: HOSPADM

## 2022-06-14 RX ORDER — PREGABALIN 75 MG/1
75 CAPSULE ORAL 3 TIMES DAILY
Status: DISCONTINUED | OUTPATIENT
Start: 2022-06-14 | End: 2022-06-15 | Stop reason: HOSPADM

## 2022-06-14 RX ADMIN — OXYCODONE AND ACETAMINOPHEN 1 TABLET: 7.5; 325 TABLET ORAL at 01:06

## 2022-06-14 RX ADMIN — OXYCODONE AND ACETAMINOPHEN 1 TABLET: 7.5; 325 TABLET ORAL at 11:06

## 2022-06-14 RX ADMIN — PREGABALIN 75 MG: 75 CAPSULE ORAL at 08:06

## 2022-06-14 RX ADMIN — PREGABALIN 75 MG: 75 CAPSULE ORAL at 12:06

## 2022-06-14 RX ADMIN — OXYCODONE AND ACETAMINOPHEN 1 TABLET: 7.5; 325 TABLET ORAL at 03:06

## 2022-06-14 RX ADMIN — MELATONIN TAB 3 MG 6 MG: 3 TAB at 11:06

## 2022-06-14 RX ADMIN — IOHEXOL 75 ML: 350 INJECTION, SOLUTION INTRAVENOUS at 12:06

## 2022-06-14 RX ADMIN — VENLAFAXINE HYDROCHLORIDE 150 MG: 75 CAPSULE, EXTENDED RELEASE ORAL at 08:06

## 2022-06-14 RX ADMIN — ATORVASTATIN CALCIUM 40 MG: 40 TABLET, FILM COATED ORAL at 08:06

## 2022-06-14 RX ADMIN — APIXABAN 5 MG: 2.5 TABLET, FILM COATED ORAL at 08:06

## 2022-06-14 RX ADMIN — PROPRANOLOL HYDROCHLORIDE 40 MG: 20 TABLET ORAL at 08:06

## 2022-06-14 RX ADMIN — POLYETHYLENE GLYCOL 3350 17 G: 17 POWDER, FOR SOLUTION ORAL at 08:06

## 2022-06-14 RX ADMIN — ALPRAZOLAM 0.5 MG: 0.5 TABLET ORAL at 04:06

## 2022-06-14 RX ADMIN — LETROZOLE 2.5 MG: 2.5 TABLET ORAL at 12:06

## 2022-06-14 RX ADMIN — ONDANSETRON 8 MG: 8 TABLET, ORALLY DISINTEGRATING ORAL at 07:06

## 2022-06-14 RX ADMIN — DOXAZOSIN 4 MG: 2 TABLET ORAL at 08:06

## 2022-06-14 NOTE — PLAN OF CARE
Problem: Adult Inpatient Plan of Care  Goal: Plan of Care Review  Outcome: Ongoing, Progressing  Flowsheets (Taken 6/14/2022 1721)  Plan of Care Reviewed With: patient  Goal: Patient-Specific Goal (Individualized)  Outcome: Ongoing, Progressing  Goal: Absence of Hospital-Acquired Illness or Injury  Outcome: Ongoing, Progressing  Goal: Optimal Comfort and Wellbeing  Outcome: Ongoing, Progressing  Goal: Readiness for Transition of Care  Outcome: Ongoing, Progressing

## 2022-06-14 NOTE — TELEPHONE ENCOUNTER
Refill Routing Note   Medication(s) are not appropriate for processing by [facility] for the following reason(s):      - Required laboratory values are abnormal  - Patient has been seen in the ED/Hospital since the last PCP visit    [facility] action(s):  Defer          Medication reconciliation completed: No     Appointments  past 12m or future 3m with PCP    Date Provider   Last Visit   5/19/2022 Peace Arias MD   Next Visit   7/11/2022 Peaec Arias MD   ED visits in past 90 days: 0        Note composed:12:34 PM 06/14/2022

## 2022-06-14 NOTE — PLAN OF CARE
P.KEESHA ARIAS, PT CURRENTLY REQUIRES [facility] FOR BED MOBILITY, HAYES FOR TF'S AND SHORT DISTANCE GAIT.  P.T. RECOMMENDS HHPT AT D/C

## 2022-06-14 NOTE — ASSESSMENT & PLAN NOTE
No cough or hypoxia.  She does have shortness of breath associated with her chest pain.  Continue Apixaban.

## 2022-06-14 NOTE — PT/OT/SLP PROGRESS
Occupational Therapy      Patient Name:  Susu Luther   MRN:  [id number]    OT eval orders received. Chart review completed. Presented to room at 1010 to complete and patient with NP. Will return and complete this AM.    Tasha Loving OT    6/14/2022   1010

## 2022-06-14 NOTE — ASSESSMENT & PLAN NOTE
Acute right arm weakness and word finding difficulty concerning for TIA/Stroke.  She has right arm weakness on exam and some delay answering questions but speech is normal.  She says the weakness has improved from its worst.  CT head is normal.  Check Brain MRI.

## 2022-06-14 NOTE — PT/OT/SLP EVAL
"Occupational Therapy   Evaluation    Name: Susu Luther  MRN: [id number]  Admitting Diagnosis:  Right sided weakness  Recent Surgery: * No surgery found *      Recommendations:     Discharge Recommendations: home health OT  Discharge Equipment Recommendations:  none  Barriers to discharge:  None    Assessment:     Susu Luther is a 72 y.o. female with a medical diagnosis of Right sided weakness.  She presents with the following performance deficits affecting function: weakness, impaired endurance, impaired self care skills, impaired functional mobilty, impaired balance, decreased upper extremity function, pain, decreased ROM, edema.      Rehab Prognosis: Fair; patient would benefit from acute skilled OT services to address these deficits and reach maximum level of function.       Plan:     Patient to be seen 2 x/week to address the above listed problems via self-care/home management, therapeutic activities, therapeutic exercises  · Plan of Care Expires: 06/28/22  · Plan of Care Reviewed with: patient    Subjective     Chief Complaint: Reported "The mass under here is getting worse."  Patient/Family Comments/goals: get stronger    Occupational Profile:  Living Environment: Patient resides in a one story home with no steps to enter, with her .  Previous level of function: Patient was independent with ADLs and ambulation (with PRN use of 4WW) prior to admission. She was an active .  Roles and Routines: n/a  Equipment Used at Home:  rollator, cane, straight  Assistance upon Discharge:     Pain/Comfort:  · Pain Rating 1: 5/10  · Location 1: arm  · Pain Addressed 1:  (activity pacing)    Objective:     Communicated with: NurseJimena, prior to session.  Patient found supine with telemetry, peripheral IV upon OT entry to room.    General Precautions: Standard, fall   Orthopedic Precautions:N/A   Braces: N/A  Respiratory Status: Room air    Bed Mobility:    · Patient completed Supine to Sit " with stand by assistance    Functional Mobility/Transfers:  · Patient completed Sit <> Stand Transfer with minimum assistance  with  hand-held assist   · Patient completed Bed <> Chair Transfer using Step Transfer technique with minimum assistance with hand-held assist  · Functional Mobility: Patient completed functional mobility x15ft with min HHA to increase dynamic standing balance and activity tolerance needed for ADL completion.    Activities of Daily Living:  · Grooming: setup .  · Upper Body Dressing: minimum assistance .    Cognitive/Visual Perceptual:  Cognitive/Psychosocial Skills:     -       Oriented to: Person, Place, Time and Situation   -       Follows Commands/attention:Follows two-step commands    Physical Exam:  Balance:    -       sitting: WFL, static standing: fair, dynamic standing: poor +  Dominant hand:    -       right  Upper Extremity Range of Motion:     -       Right Upper Extremity: tolerated ~25% PROM  -       Left Upper Extremity: WFL  Upper Extremity Strength:    -       Left Upper Extremity: Deficits: grossly 4+/5   Strength:    -       Right Upper Extremity: Deficits: fair  -       Left Upper Extremity: WFL    AMPAC 6 Click ADL:  AMPAC Total Score: 20    Treatment & Education:  Patient educated on role of OT in acute setting and benefits of participation. Patient educated on safe mobility techniques to use to increase independence and decrease fall risk. Patient with increased R axilla pain limiting AROM of R shdr. Poor tolerance for attempts at PROM. Reports that weakness in R UE has increased since new dx of increasing mass in that area. Will complete A/AA/PROM to tolerance throughout hospitalization. Educated to call for A to transfer back to bed.  Education:    Patient left up in chair with all lines intact, call button in reach and chair alarm on    GOALS:   Multidisciplinary Problems     Occupational Therapy Goals        Problem: Occupational Therapy    Goal Priority  Disciplines Outcome Interventions   Occupational Therapy Goal     OT, PT/OT     Description: Goals to be met by: 22     Patient will increase functional independence with ADLs by performing:    Toileting from toilet with Set-up Assistance for hygiene and clothing management.   Toilet transfer to toilet with Stand-by Assistance.  Increased AROM in R UE to WFL.                     History:     Past Medical History:   Diagnosis Date    Chronic diastolic congestive heart failure 2020    Encounter for blood transfusion     History of repair of aneurysm of abdominal aorta using endovascular stent graft     DR Bonds     of psychiatric care     Hypertension     Major depressive disorder, single episode, moderate with anxious distress 2020    Malignant neoplasm of upper-outer quadrant of right breast in female, estrogen receptor positive 3/14/2019    radiation    Psychiatric problem     Sleep apnea     Sleep difficulties     Stroke     no residual defect    Therapy        Past Surgical History:   Procedure Laterality Date    APPENDECTOMY      AXILLARY NODE DISSECTION Right 2020    Procedure: LYMPHADENECTOMY, AXILLARY;  Surgeon: Artemio Starks MD;  Location: [facility];  Service: General;  Laterality: Right;    BIOPSY OF AXILLARY NODE Right 2021    Procedure: BIOPSY, LYMPH NODE, AXILLARY;  Surgeon: Artemio Sutton MD;  Location: [address];  Service: General;  Laterality: Right;    BREAST BIOPSY      2019    BREAST LUMPECTOMY      2019     SECTION      x 1    OOPHORECTOMY      right     SENTINEL LYMPH NODE BIOPSY Right 2019    Procedure: BIOPSY, LYMPH NODE, SENTINEL;  Surgeon: Artemio Starks MD;  Location: [facility];  Service: General;  Laterality: Right;    splenic artery aneurysm repair      TONSILLECTOMY         Time Tracking:     OT Date of Treatment: 22  OT Start Time: 1030  OT Stop Time: 1055  OT Total Time (min): 25 min    Billable  Minutes:Evaluation 25    6/14/2022

## 2022-06-14 NOTE — PHARMACY MED REC
"Admission Medication History     The home medication history was taken by Jairon Payan.    You may go to "Admission" then "Reconcile Home Medications" tabs to review and/or act upon these items.      The home medication list has been updated by the Pharmacy department.    Please read ALL comments highlighted in yellow.    Please address this information as you see fit.     Feel free to contact us if you have any questions or require assistance.      Medications listed below were obtained from: Analytic software- [employer] and Medical records  PTA Medications   Medication Sig    ALPRAZolam (XANAX) 0.5 MG tablet Take 1 tablet (0.5 mg total) by mouth 2 (two) times daily as needed for Insomnia or Anxiety.    amLODIPine (NORVASC) 10 MG tablet Take 1 tablet (10 mg total) by mouth once daily.    apixaban (ELIQUIS) 5 mg Tab Take 1 tablet (5 mg total) by mouth 2 (two) times daily.    atorvastatin (LIPITOR) 40 MG tablet Take 1 tablet (40 mg total) by mouth once daily.    blood glucose control, normal Soln 1 Bottle by Misc.(Non-Drug; Combo Route) route daily as needed.    cholecalciferol, vitamin D3, 1,250 mcg (50,000 unit) capsule Take 1 capsule (50,000 Units total) by mouth once a week. (Patient taking differently: Take 50,000 Units by mouth once a week. on Friday)    doxazosin (CARDURA) 4 MG tablet Take 1 tablet (4 mg total) by mouth every evening.    furosemide (LASIX) 20 MG tablet take 1/2 tablet by mouth once a day.    pregabalin (LYRICA) 75 MG capsule Take 1 capsule (75 mg total) by mouth 3 (three) times daily.    propranoloL (INDERAL) 40 MG tablet Take 1 tablet by mouth twice daily    telmisartan (MICARDIS) 80 MG Tab TAKE 1 TABLET BY MOUTH ONCE DAILY REPLACES  LOSARTAN    albuterol (VENTOLIN HFA) 90 mcg/actuation inhaler Inhale 2 puffs into the lungs every 4 (four) hours as needed for Shortness of Breath.    alcohol swabs (BD ALCOHOL SWABS) PadM Apply 1 each topically once daily.    blood sugar " diagnostic Strp 1 strip by Misc.(Non-Drug; Combo Route) route 2 (two) times daily.    blood-glucose meter (ACCU-CHEK JENNIFER PLUS METER) Misc 1 each by Misc.(Non-Drug; Combo Route) route 2 (two) times a day.    diclofenac sodium (VOLTAREN) 1 % Gel Apply 2 grams topically once daily.    hydrALAZINE (APRESOLINE) 100 MG tablet once daily.    hydroCHLOROthiazide (HYDRODIURIL) 50 MG tablet Take 1 tablet (50 mg total) by mouth once daily.    hydrOXYzine HCL (ATARAX) 25 MG tablet Take 1 tablet (25 mg total) by mouth every evening.    lancets (ACCU-CHEK SOFTCLIX LANCETS) Misc 1 each by Misc.(Non-Drug; Combo Route) route 2 (two) times a day.    letrozole (FEMARA) 2.5 mg Tab Take 1 tablet by mouth once daily    LIDOcaine (LIDODERM) 5 % Place 2 patches onto the skin once daily. Remove & Discard patch within 12 hours or as directed by MD    multivitamin (THERAGRAN) per tablet Take 1 tablet by mouth once daily.    oxyCODONE-acetaminophen (PERCOCET) 7.5-325 mg per tablet Take 1 tablet by mouth every 8 (eight) hours as needed for Pain.    tiZANidine (ZANAFLEX) 4 MG tablet Take 1 tablet (4 mg total) by mouth every 8 (eight) hours as needed (muscle spasm).    venlafaxine (EFFEXOR-XR) 150 MG Cp24 Take 1 capsule (150 mg total) by mouth once daily.    zolpidem (AMBIEN) 5 MG Tab Take 1 tablet (5 mg total) by mouth nightly as needed (sleep).           Jairon Payan  [id number]              .

## 2022-06-14 NOTE — H&P
[facility] - Emergency Dept.  [facility] Medicine  History & Physical    Patient Name: Susu Luther  MRN: [id number]  Patient Class: OP- Observation  Admission Date: 6/13/2022  Attending Physician: Johnson Hoskins Jr., MD   Primary Care Provider: Peace Arias MD         Patient information was obtained from patient, past medical records and ER records.     Subjective:     Principal Problem:Right sided weakness    Chief Complaint:   Chief Complaint   Patient presents with    Chest Pain     Intermittent chest pain x 2 days to middle of chest described as pressure.  Pt also reports SOB x 2 months for which she has been seen by Dr. Hobbs.  Pt reports CHF history.        HPI: Ms. Luther came to the ED for evaluation of right side chest pain and right arm weakness with word finding difficulty.  The chest pain had been on and off for 2 weeks.  Right side weakness and word finding difficulty started today.  The chest pain is associated with shortness of breath and dizziness.  She denies any fevers or chills, nausea or vomiting.  No other recent illness but she is starting workup of a right breast mass that is worrisome for recurrence of breast cancer.  She is compliant with taking Apixaban for DVT/PE.  Full code and her  is her surrogate decision maker.      Past Medical History:   Diagnosis Date    Chronic diastolic congestive heart failure 8/25/2020    Encounter for blood transfusion     History of repair of aneurysm of abdominal aorta using endovascular stent graft     DR Bonds    Hx of psychiatric care     Hypertension     Major depressive disorder, single episode, moderate with anxious distress 1/14/2020    Malignant neoplasm of upper-outer quadrant of right breast in female, estrogen receptor positive 3/14/2019    radiation    Psychiatric problem     Sleep apnea     Sleep difficulties     Stroke 2009    no residual defect    Therapy        Past Surgical History:   Procedure Laterality Date     APPENDECTOMY      AXILLARY NODE DISSECTION Right 2020    Procedure: LYMPHADENECTOMY, AXILLARY;  Surgeon: Artemio Starks MD;  Location: [facility] OR;  Service: General;  Laterality: Right;    BIOPSY OF AXILLARY NODE Right 2021    Procedure: BIOPSY, LYMPH NODE, AXILLARY;  Surgeon: Artemio Sutton MD;  [facility];  Service: General;  Laterality: Right;    BREAST BIOPSY          BREAST LUMPECTOMY           SECTION      x 1    OOPHORECTOMY      right     SENTINEL LYMPH NODE BIOPSY Right 2019    Procedure: BIOPSY, LYMPH NODE, SENTINEL;  Surgeon: Artemio Satrks MD;  Location: [facility] OR;  Service: General;  Laterality: Right;    splenic artery aneurysm repair      TONSILLECTOMY         Review of patient's allergies indicates:   Allergen Reactions    Pcn [penicillins] Hives       No current facility-administered medications on file prior to encounter.     Current Outpatient Medications on File Prior to Encounter   Medication Sig    albuterol (VENTOLIN HFA) 90 mcg/actuation inhaler Inhale 2 puffs into the lungs every 4 (four) hours as needed for Shortness of Breath.    alcohol swabs (BD ALCOHOL SWABS) PadM Apply 1 each topically once daily.    ALPRAZolam (XANAX) 0.5 MG tablet Take 1 tablet (0.5 mg total) by mouth 2 (two) times daily as needed for Insomnia or Anxiety.    amLODIPine (NORVASC) 10 MG tablet Take 1 tablet (10 mg total) by mouth once daily.    apixaban (ELIQUIS) 5 mg Tab Take 1 tablet (5 mg total) by mouth 2 (two) times daily.    atorvastatin (LIPITOR) 40 MG tablet Take 1 tablet (40 mg total) by mouth once daily.    blood glucose control, normal Soln 1 Bottle by Misc.(Non-Drug; Combo Route) route daily as needed.    blood sugar diagnostic Strp 1 strip by Misc.(Non-Drug; Combo Route) route 2 (two) times daily.    blood-glucose meter (ACCU-CHEK JENNIFER PLUS METER) Misc 1 each by Misc.(Non-Drug; Combo Route) route 2 (two) times a day.     cholecalciferol, vitamin D3, 1,250 mcg (50,000 unit) capsule Take 1 capsule (50,000 Units total) by mouth once a week. (Patient taking differently: Take 50,000 Units by mouth once a week. on Friday)    diclofenac sodium (VOLTAREN) 1 % Gel Apply 2 grams topically once daily.    doxazosin (CARDURA) 4 MG tablet Take 1 tablet (4 mg total) by mouth every evening.    furosemide (LASIX) 20 MG tablet take 1/2 tablet by mouth once a day.    hydrALAZINE (APRESOLINE) 100 MG tablet once daily.    hydroCHLOROthiazide (HYDRODIURIL) 50 MG tablet Take 1 tablet (50 mg total) by mouth once daily.    hydrOXYzine HCL (ATARAX) 25 MG tablet Take 1 tablet (25 mg total) by mouth every evening.    lancets (ACCU-CHEK SOFTCLIX LANCETS) Misc 1 each by Misc.(Non-Drug; Combo Route) route 2 (two) times a day.    letrozole (FEMARA) 2.5 mg Tab Take 1 tablet by mouth once daily    LIDOcaine (LIDODERM) 5 % Place 2 patches onto the skin once daily. Remove & Discard patch within 12 hours or as directed by MD    multivitamin (THERAGRAN) per tablet Take 1 tablet by mouth once daily.    oxyCODONE-acetaminophen (PERCOCET) 7.5-325 mg per tablet Take 1 tablet by mouth every 8 (eight) hours as needed for Pain.    pregabalin (LYRICA) 75 MG capsule Take 1 capsule (75 mg total) by mouth 3 (three) times daily.    propranoloL (INDERAL) 40 MG tablet Take 1 tablet by mouth twice daily    telmisartan (MICARDIS) 80 MG Tab TAKE 1 TABLET BY MOUTH ONCE DAILY REPLACES  LOSARTAN    tiZANidine (ZANAFLEX) 4 MG tablet Take 1 tablet (4 mg total) by mouth every 8 (eight) hours as needed (muscle spasm).    venlafaxine (EFFEXOR-XR) 150 MG Cp24 Take 1 capsule (150 mg total) by mouth once daily.    zolpidem (AMBIEN) 5 MG Tab Take 1 tablet (5 mg total) by mouth nightly as needed (sleep).     Family History       Problem Relation (Age of Onset)    Diabetes Maternal Grandmother, Maternal Grandfather, Mother    Hypertension Mother    Sickle cell anemia Daughter           Tobacco Use    Smoking status: Never Smoker    Smokeless tobacco: Never Used   Substance and Sexual Activity    Alcohol use: No    Drug use: No    Sexual activity: Yes     Partners: Male     Birth control/protection: Post-menopausal     Review of Systems   Constitutional:  Negative for chills and fever.   HENT:  Negative for congestion and sore throat.    Eyes:  Negative for visual disturbance.   Respiratory:  Positive for chest tightness. Negative for cough, shortness of breath and wheezing.    Cardiovascular:  Positive for chest pain. Negative for palpitations and leg swelling.   Gastrointestinal:  Negative for abdominal pain, blood in stool, constipation, diarrhea, nausea and vomiting.   Genitourinary:  Negative for dysuria and hematuria.   Musculoskeletal:  Negative for arthralgias and back pain.   Skin:  Negative for rash and wound.   Neurological:  Positive for speech difficulty and weakness. Negative for dizziness, light-headedness and numbness.   Hematological:  Negative for adenopathy.   Objective:     Vital Signs (Most Recent):  Temp: 98.1 °F (36.7 °C) (06/13/22 1518)  Pulse: 70 (06/13/22 2300)  Resp: 18 (06/13/22 2300)  BP: (!) 147/72 (06/13/22 2300)  SpO2: 99 % (06/13/22 2300)   Vital Signs (24h Range):  Temp:  [98.1 °F (36.7 °C)] 98.1 °F (36.7 °C)  Pulse:  [] 70  Resp:  [15-28] 18  SpO2:  [91 %-99 %] 99 %  BP: ()/(51-88) 147/72     Weight: (!) 141.1 kg (311 lb 1.1 oz)  Body mass index is 45.94 kg/m².    Physical Exam  Vitals and nursing note reviewed.   Constitutional:       General: She is not in acute distress.     Appearance: She is well-developed.   HENT:      Head: Normocephalic and atraumatic.   Eyes:      Conjunctiva/sclera: Conjunctivae normal.      Pupils: Pupils are equal, round, and reactive to light.   Neck:      Thyroid: No thyromegaly.      Vascular: No JVD.   Cardiovascular:      Rate and Rhythm: Normal rate and regular rhythm.      Heart sounds: No murmur heard.     No friction rub. No gallop.   Pulmonary:      Effort: Pulmonary effort is normal.      Breath sounds: Normal breath sounds. No wheezing or rales.   Abdominal:      General: Bowel sounds are normal. There is no distension.      Palpations: Abdomen is soft.      Tenderness: There is no abdominal tenderness. There is no guarding or rebound.   Musculoskeletal:         General: No deformity. Normal range of motion.      Cervical back: Neck supple.   Lymphadenopathy:      Cervical: No cervical adenopathy.   Skin:     General: Skin is warm and dry.      Findings: No rash.   Neurological:      Mental Status: She is alert and oriented to person, place, and time.      Deep Tendon Reflexes: Reflexes are normal and symmetric.      Comments: 4/5 Right upper extremity strength with normal sensation.  Word finding difficulty.  5/5 lower extremity strength bilaterally and right lower extremity strength and normal sensation.   Psychiatric:         Behavior: Behavior normal.         Thought Content: Thought content normal.         Judgment: Judgment normal.         CRANIAL NERVES     CN III, IV, VI   Pupils are equal, round, and reactive to light.     Significant Labs: All pertinent labs within the past 24 hours have been reviewed.    Significant Imaging: I have reviewed all pertinent imaging results/findings within the past 24 hours.    Assessment/Plan:     * Right sided weakness  Acute right arm weakness and word finding difficulty concerning for TIA/Stroke.  She has right arm weakness on exam and some delay answering questions but speech is normal.  She says the weakness has improved from its worst.  CT head is normal.  Check Brain MRI.    Other chest pain  Atypical substernal to right sided chest pain and right facial pain.  Initial cardiac evaluation is normal.  Continue EKG as needed and trend cardiac enzymes.  She follows outpatient with Dr. Hobbs.    TANNER (acute kidney injury)  CKD3 at baseline with a creatinine usually about  1.3.  Holding Telmisartan, Furosemide, and HCTZ.  Follow renal function chemistries and urine output.    History of pulmonary embolism  No cough or hypoxia.  She does have shortness of breath associated with her chest pain.  Continue Apixaban.    Hypertension  Continue Amlodipine, Doxazosin, and Hydralazine.    NILS (obstructive sleep apnea)  CPAP nightly.      VTE Risk Mitigation (From admission, onward)         Ordered     apixaban tablet 5 mg  2 times daily         06/14/22 0136     IP VTE HIGH RISK PATIENT  Once         06/14/22 0136     Place sequential compression device  Until discontinued         06/14/22 0136     Reason for No Pharmacological VTE Prophylaxis  Once        Question:  Reasons:  Answer:  Already adequately anticoagulated on oral Anticoagulants    06/14/22 0136                   Rogelio Morales MD  Department of Hospital Medicine   [facility] - Emergency Dept.

## 2022-06-14 NOTE — TELEPHONE ENCOUNTER
No new care gaps identified.  [facility] Embedded Care Gaps. Reference number: [id number]. 6/13/2022   7:29:05 PM CDT

## 2022-06-14 NOTE — ASSESSMENT & PLAN NOTE
CKD3 at baseline with a creatinine usually about 1.3.  Holding Telmisartan, Furosemide, and HCTZ.  Follow renal function chemistries and urine output.

## 2022-06-14 NOTE — SUBJECTIVE & OBJECTIVE
Past Medical History:   Diagnosis Date    Chronic diastolic congestive heart failure 2020    Encounter for blood transfusion     History of repair of aneurysm of abdominal aorta using endovascular stent graft     DR Bonds     of psychiatric care     Hypertension     Major depressive disorder, single episode, moderate with anxious distress 2020    Malignant neoplasm of upper-outer quadrant of right breast in female, estrogen receptor positive 3/14/2019    radiation    Psychiatric problem     Sleep apnea     Sleep difficulties     Stroke 2009    no residual defect    Therapy        Past Surgical History:   Procedure Laterality Date    APPENDECTOMY      AXILLARY NODE DISSECTION Right 2020    Procedure: LYMPHADENECTOMY, AXILLARY;  Surgeon: Artemio Starks MD;  Location: [facility];  Service: General;  Laterality: Right;    BIOPSY OF AXILLARY NODE Right 2021    Procedure: BIOPSY, LYMPH NODE, AXILLARY;  Surgeon: Artemio Sutton MD;  Location: [address];  Service: General;  Laterality: Right;    BREAST BIOPSY      2019    BREAST LUMPECTOMY      2019     SECTION      x 1    OOPHORECTOMY      right     SENTINEL LYMPH NODE BIOPSY Right 2019    Procedure: BIOPSY, LYMPH NODE, SENTINEL;  Surgeon: Artemio Starks MD;  Location: [facility];  Service: General;  Laterality: Right;    splenic artery aneurysm repair      TONSILLECTOMY         Review of patient's allergies indicates:   Allergen Reactions    Pcn [penicillins] Hives       No current facility-administered medications on file prior to encounter.     Current Outpatient Medications on File Prior to Encounter   Medication Sig    albuterol (VENTOLIN HFA) 90 mcg/actuation inhaler Inhale 2 puffs into the lungs every 4 (four) hours as needed for Shortness of Breath.    alcohol swabs (BD ALCOHOL SWABS) PadM Apply 1 each topically once daily.    ALPRAZolam (XANAX) 0.5 MG tablet Take 1 tablet (0.5 mg total) by mouth 2 (two) times daily  as needed for Insomnia or Anxiety.    amLODIPine (NORVASC) 10 MG tablet Take 1 tablet (10 mg total) by mouth once daily.    apixaban (ELIQUIS) 5 mg Tab Take 1 tablet (5 mg total) by mouth 2 (two) times daily.    atorvastatin (LIPITOR) 40 MG tablet Take 1 tablet (40 mg total) by mouth once daily.    blood glucose control, normal Soln 1 Bottle by Misc.(Non-Drug; Combo Route) route daily as needed.    blood sugar diagnostic Strp 1 strip by Misc.(Non-Drug; Combo Route) route 2 (two) times daily.    blood-glucose meter (ACCU-CHEK JENNIFER PLUS METER) Misc 1 each by Misc.(Non-Drug; Combo Route) route 2 (two) times a day.    cholecalciferol, vitamin D3, 1,250 mcg (50,000 unit) capsule Take 1 capsule (50,000 Units total) by mouth once a week. (Patient taking differently: Take 50,000 Units by mouth once a week. on Friday)    diclofenac sodium (VOLTAREN) 1 % Gel Apply 2 grams topically once daily.    doxazosin (CARDURA) 4 MG tablet Take 1 tablet (4 mg total) by mouth every evening.    furosemide (LASIX) 20 MG tablet take 1/2 tablet by mouth once a day.    hydrALAZINE (APRESOLINE) 100 MG tablet once daily.    hydroCHLOROthiazide (HYDRODIURIL) 50 MG tablet Take 1 tablet (50 mg total) by mouth once daily.    hydrOXYzine HCL (ATARAX) 25 MG tablet Take 1 tablet (25 mg total) by mouth every evening.    lancets (ACCU-CHEK SOFTCLIX LANCETS) Misc 1 each by Misc.(Non-Drug; Combo Route) route 2 (two) times a day.    letrozole (FEMARA) 2.5 mg Tab Take 1 tablet by mouth once daily    LIDOcaine (LIDODERM) 5 % Place 2 patches onto the skin once daily. Remove & Discard patch within 12 hours or as directed by MD    multivitamin (THERAGRAN) per tablet Take 1 tablet by mouth once daily.    oxyCODONE-acetaminophen (PERCOCET) 7.5-325 mg per tablet Take 1 tablet by mouth every 8 (eight) hours as needed for Pain.    pregabalin (LYRICA) 75 MG capsule Take 1 capsule (75 mg total) by mouth 3 (three) times daily.    propranoloL (INDERAL) 40 MG tablet Take  1 tablet by mouth twice daily    telmisartan (MICARDIS) 80 MG Tab TAKE 1 TABLET BY MOUTH ONCE DAILY REPLACES  LOSARTAN    tiZANidine (ZANAFLEX) 4 MG tablet Take 1 tablet (4 mg total) by mouth every 8 (eight) hours as needed (muscle spasm).    venlafaxine (EFFEXOR-XR) 150 MG Cp24 Take 1 capsule (150 mg total) by mouth once daily.    zolpidem (AMBIEN) 5 MG Tab Take 1 tablet (5 mg total) by mouth nightly as needed (sleep).     Family History       Problem Relation (Age of Onset)    Diabetes Maternal Grandmother, Maternal Grandfather, Mother    Hypertension Mother    Sickle cell anemia Daughter          Tobacco Use    Smoking status: Never Smoker    Smokeless tobacco: Never Used   Substance and Sexual Activity    Alcohol use: No    Drug use: No    Sexual activity: Yes     Partners: Male     Birth control/protection: Post-menopausal     Review of Systems   Constitutional:  Negative for chills and fever.   HENT:  Negative for congestion and sore throat.    Eyes:  Negative for visual disturbance.   Respiratory:  Positive for chest tightness. Negative for cough, shortness of breath and wheezing.    Cardiovascular:  Positive for chest pain. Negative for palpitations and leg swelling.   Gastrointestinal:  Negative for abdominal pain, blood in stool, constipation, diarrhea, nausea and vomiting.   Genitourinary:  Negative for dysuria and hematuria.   Musculoskeletal:  Negative for arthralgias and back pain.   Skin:  Negative for rash and wound.   Neurological:  Positive for speech difficulty and weakness. Negative for dizziness, light-headedness and numbness.   Hematological:  Negative for adenopathy.   Objective:     Vital Signs (Most Recent):  Temp: 98.1 °F (36.7 °C) (06/13/22 1518)  Pulse: 70 (06/13/22 2300)  Resp: 18 (06/13/22 2300)  BP: (!) 147/72 (06/13/22 2300)  SpO2: 99 % (06/13/22 2300)   Vital Signs (24h Range):  Temp:  [98.1 °F (36.7 °C)] 98.1 °F (36.7 °C)  Pulse:  [] 70  Resp:  [15-28] 18  SpO2:  [91 %-99 %]  99 %  BP: ()/(51-88) 147/72     Weight: (!) 141.1 kg (311 lb 1.1 oz)  Body mass index is 45.94 kg/m².    Physical Exam  Vitals and nursing note reviewed.   Constitutional:       General: She is not in acute distress.     Appearance: She is well-developed.   HENT:      Head: Normocephalic and atraumatic.   Eyes:      Conjunctiva/sclera: Conjunctivae normal.      Pupils: Pupils are equal, round, and reactive to light.   Neck:      Thyroid: No thyromegaly.      Vascular: No JVD.   Cardiovascular:      Rate and Rhythm: Normal rate and regular rhythm.      Heart sounds: No murmur heard.    No friction rub. No gallop.   Pulmonary:      Effort: Pulmonary effort is normal.      Breath sounds: Normal breath sounds. No wheezing or rales.   Abdominal:      General: Bowel sounds are normal. There is no distension.      Palpations: Abdomen is soft.      Tenderness: There is no abdominal tenderness. There is no guarding or rebound.   Musculoskeletal:         General: No deformity. Normal range of motion.      Cervical back: Neck supple.   Lymphadenopathy:      Cervical: No cervical adenopathy.   Skin:     General: Skin is warm and dry.      Findings: No rash.   Neurological:      Mental Status: She is alert and oriented to person, place, and time.      Deep Tendon Reflexes: Reflexes are normal and symmetric.      Comments: 4/5 Right upper extremity strength with normal sensation.  Word finding difficulty.  5/5 lower extremity strength bilaterally and right lower extremity strength and normal sensation.   Psychiatric:         Behavior: Behavior normal.         Thought Content: Thought content normal.         Judgment: Judgment normal.         CRANIAL NERVES     CN III, IV, VI   Pupils are equal, round, and reactive to light.     Significant Labs: All pertinent labs within the past 24 hours have been reviewed.    Significant Imaging: I have reviewed all pertinent imaging results/findings within the past 24 hours.

## 2022-06-14 NOTE — PLAN OF CARE
OT lolis completed. Sup>sit SBA, sit>stand min A, sit>stand min A, functional mobility x15ft with min HHA, step>pivot to bedside chair with min A. Recommending HHOT at d/c.

## 2022-06-14 NOTE — PT/OT/SLP EVAL
Physical Therapy Evaluation    Patient Name:  Susu Luther   MRN:  [id number]    Recommendations:     Discharge Recommendations:  home health PT   Discharge Equipment Recommendations: none   Barriers to discharge: None    Assessment:     Susu Luther is a 72 y.o. female admitted with a medical diagnosis of Right sided weakness.  She presents with the following impairments/functional limitations:  weakness, impaired endurance, impaired functional mobilty, gait instability, impaired balance, decreased safety awareness, pain, decreased lower extremity function, decreased upper extremity function, decreased coordination.    Rehab Prognosis: Good; patient would benefit from acute skilled PT services to address these deficits and reach maximum level of function.    Recent Surgery: * No surgery found *     Plan:     During this hospitalization, patient to be seen 3 x/week to address the identified rehab impairments via gait training, therapeutic activities, therapeutic exercises and progress toward the following goals:    · Plan of Care Expires:  06/28/22    Subjective     Chief Complaint: WEAKNESS, WANTS TO STAY IN BED INSTEAD OF SITTING IN CHAIR  Patient/Family Comments/goals:   Pain/Comfort:  · Pain Rating 1: 5/10  · Location - Side 1: Right  · Location 1: groin (PAIN WITH MOVEMENT)  · Pain Addressed 1: Reposition, Distraction, Cessation of Activity    Patients cultural, spiritual, Amish conflicts given the current situation:      Living Environment:  PT LIVES WITH , AMB INDEP COMMUNITY DISTANCES, USES ROLLATOR WALKER AS NEEDED, DRIVES, INDEP WITH ADL'S  Prior to admission, patients level of function was INDEP.  Equipment used at home: rollator, cane, straight.  DME owned (not currently used): none.  Upon discharge, patient will have assistance from .    Objective:     Communicated with NURSE TRIVEDI prior to session.  Patient found supine with telemetry, peripheral IV  upon PT entry to  room.    General Precautions: Standard, fall, PT REPORTS INTERMITTENT BLURRY VISION THAT STARTED 3-4 MONTHS AGO  Orthopedic Precautions:N/A   Braces: N/A  Respiratory Status: Room air    Exams:  · Cognitive Exam:  Patient is oriented to Person, Place, Time and Situation  · Postural Exam:  Patient presented with the following abnormalities:    · -       Rounded shoulders  · Sensation:    · -       Impaired  light/touch PT C/O INTERMITTENT B FOOT NUMBNESS  · RLE ROM: WFL  · RLE Strength: GROSSLY 3+/5, PAINFUL WITH MOVEMENT IN ALL PLANES PER PT  · LLE ROM: WFL  · LLE Strength: GROSSLY 3+/5    Functional Mobility:  · Bed Mobility:     · Rolling Left:  stand by assistance  · Scooting: stand by assistance  · Supine to Sit: stand by assistance  · Transfers:     · Sit to Stand:  minimum assistance with rolling walker  · Bed to Chair: minimum assistance with  rolling walker  using  Step Transfer  · Gait: PT AMB 15' NO AD HAYES WITH HAND HELD ASSIST, C/O RLE PAIN, QUICK TO FATIGUE, ENCOURAGED RW USE, CUES FOR UPRIGHT POSTURE, NO GROSS LOB  · Balance: POOR+    Therapeutic Activities and Exercises:   PT EDUCATED IN ROLE OF P.T. AND POC, ENCOURAGED TO INCREASE TIME OOB IN CHAIR, ENCOURAGED TO USE RW FOR GAIT TO IMPROVE BALANCE AND PAIN.  PT EDUCATED IN BLE THEREX TO PERFORM WHILE SEATED IN CHAIR: HIP FLEX/EXT, LAQ, AP'S    AM-PAC 6 CLICK MOBILITY  Total Score:18     Patient left up in chair with all lines intact, call button in reach, chair alarm on and NURSE notified.    GOALS:   Multidisciplinary Problems     Physical Therapy Goals        Problem: Physical Therapy    Goal Priority Disciplines Outcome Goal Variances Interventions   Physical Therapy Goal     PT, PT/OT      Description: LTG'S TO BE MET IN 14 DAYS (6-28-22)  1. PT WILL BE SERVANDO WITH BED MOBILITY  2. PT WILL REQUIRE SBA FOR TF'S  3. PT WILL ' WITH RW AND SBA                   History:     Past Medical History:   Diagnosis Date    Chronic diastolic congestive  heart failure 2020    Encounter for blood transfusion     History of repair of aneurysm of abdominal aorta using endovascular stent graft     DR Bonds     of psychiatric care     Hypertension     Major depressive disorder, single episode, moderate with anxious distress 2020    Malignant neoplasm of upper-outer quadrant of right breast in female, estrogen receptor positive 3/14/2019    radiation    Psychiatric problem     Sleep apnea     Sleep difficulties     Stroke 2009    no residual defect    Therapy        Past Surgical History:   Procedure Laterality Date    APPENDECTOMY      AXILLARY NODE DISSECTION Right 2020    Procedure: LYMPHADENECTOMY, AXILLARY;  Surgeon: Artemio Starks MD;  Location: [facility] OR;  Service: General;  Laterality: Right;    BIOPSY OF AXILLARY NODE Right 2021    Procedure: BIOPSY, LYMPH NODE, AXILLARY;  Surgeon: Artemio Sutton MD;  Location: [address];  Service: General;  Laterality: Right;    BREAST BIOPSY      2019    BREAST LUMPECTOMY      2019     SECTION      x 1    OOPHORECTOMY      right     SENTINEL LYMPH NODE BIOPSY Right 2019    Procedure: BIOPSY, LYMPH NODE, SENTINEL;  Surgeon: Artemio Starks MD;  Location: [facility];  Service: General;  Laterality: Right;    splenic artery aneurysm repair      TONSILLECTOMY         Time Tracking:     PT Received On: 22  PT Start Time: 1030     PT Stop Time: 1053  PT Total Time (min): 23 min     Billable Minutes: Evaluation 15 and Therapeutic Activity 8    2022

## 2022-06-14 NOTE — HPI
Ms. Luther came to the ED for evaluation of right side chest pain and right arm weakness with word finding difficulty.  The chest pain had been on and off for 2 weeks.  Right side weakness and word finding difficulty started today.  The chest pain is associated with shortness of breath and dizziness.  She denies any fevers or chills, nausea or vomiting.  No other recent illness but she is starting workup of a right breast mass that is worrisome for recurrence of breast cancer.  She is compliant with taking Apixaban for DVT/PE.  Full code and her  is her surrogate decision maker.

## 2022-06-14 NOTE — ASSESSMENT & PLAN NOTE
Atypical substernal to right sided chest pain and right facial pain.  Initial cardiac evaluation is normal.  Continue EKG as needed and trend cardiac enzymes.  She follows outpatient with Dr. Hobbs.

## 2022-06-14 NOTE — CARE UPDATE
Susu Luther was placed into observation this morning for TANNER, chest pain, and TIA/CVA work up. Kidney functioning has improved. Cardiac enzymes negative. Last ECHO in 2/2022-EF 60%. Right sided weakness and word finding difficulties present- MRI Brain and Carotid US pending.  Patient endorsing chest pressure and shortness of breath as well as right upper leg pain. Patient with extensive history of thrombus including PE, DVT on daily apixaban of which she reports compliance with. Additionally, has a history of breast cancer with a right sided subpectoral mass with adjacent lymph nodes noted on previous imaging. Will obtain CTA of chest pain could be from this mass vs PE. Continue OAC. Permissive HTN until MRI results. Labs reviewed and WNL. PT/OT/SLP consults.  consulted for assistance with rehab placement- patient may possibly need/benefit from acute rehab.        Lab Results   Component Value Date    TROPONINI 0.013 06/14/2022    TROPONINI 0.017 06/13/2022    TROPONINI 0.021 06/13/2022    TROPONINI 0.017 05/16/2022       Results for orders placed or performed during the hospital encounter of 06/13/22   CBC auto differential   Result Value Ref Range    WBC 8.84 3.90 - 12.70 K/uL    RBC 4.26 4.00 - 5.40 M/uL    Hemoglobin 10.3 (L) 12.0 - 16.0 g/dL    Hematocrit 33.8 (L) 37.0 - 48.5 %    MCV 79 (L) 82 - 98 fL    MCH 24.2 (L) 27.0 - 31.0 pg    MCHC 30.5 (L) 32.0 - 36.0 g/dL    RDW 16.2 (H) 11.5 - 14.5 %    Platelets 235 150 - 450 K/uL    MPV 9.5 9.2 - 12.9 fL    Immature Granulocytes 0.2 0.0 - 0.5 %    Gran # (ANC) 5.3 1.8 - 7.7 K/uL    Immature Grans (Abs) 0.02 0.00 - 0.04 K/uL    Lymph # 2.6 1.0 - 4.8 K/uL    Mono # 0.7 0.3 - 1.0 K/uL    Eos # 0.2 0.0 - 0.5 K/uL    Baso # 0.06 0.00 - 0.20 K/uL    nRBC 0 0 /100 WBC    Gran % 59.5 38.0 - 73.0 %    Lymph % 29.2 18.0 - 48.0 %    Mono % 8.4 4.0 - 15.0 %    Eosinophil % 2.0 0.0 - 8.0 %    Basophil % 0.7 0.0 - 1.9 %    Differential Method Automated       Results for orders placed or performed during the hospital encounter of 06/13/22   Renal Function Panel   Result Value Ref Range    Glucose 114 (H) 70 - 110 mg/dL    Sodium 141 136 - 145 mmol/L    Potassium 3.9 3.5 - 5.1 mmol/L    Chloride 109 95 - 110 mmol/L    CO2 23 23 - 29 mmol/L    BUN 26 (H) 8 - 23 mg/dL    Calcium 8.8 8.7 - 10.5 mg/dL    Creatinine 1.4 0.5 - 1.4 mg/dL    Albumin 2.8 (L) 3.5 - 5.2 g/dL    Phosphorus 3.6 2.7 - 4.5 mg/dL    eGFR if African American 44 (A) >60 mL/min/1.73 m^2    eGFR if non African American 38 (A) >60 mL/min/1.73 m^2    Anion Gap 9 8 - 16 mmol/L     Imaging Results          CT Head Without Contrast (Final result)  Result time 06/13/22 20:16:36    Final result by Edouard Gomez MD (06/13/22 20:16:36)                 Impression:      No hemorrhage or acute major vascular distribution infarction.  Clinical correlation and further evaluation as warranted.    ASPECT score 10 of 10.    All CT scans at this facility are performed  using dose modulation techniques as appropriate to performed exam including the following:  automated exposure control; adjustment of mA and/or kV according to the patients size (this includes techniques or standardized protocols for targeted exams where dose is matched to indication/reason for exam: i.e. extremities or head);  iterative reconstruction technique.      Electronically signed by: Edouard Gomez  Date:    06/13/2022  Time:    20:16             Narrative:    EXAMINATION:  CT HEAD WITHOUT CONTRAST    CLINICAL HISTORY:  Neuro deficit, acute, stroke suspected; Headache, unspecified    TECHNIQUE:  Low dose axial CT images obtained throughout the head without intravenous contrast. Sagittal and coronal reconstructions were performed.    COMPARISON:  Multiple priors.    FINDINGS:  Intracranial compartment:    Ventricles and sulci are normal in size for age without evidence of hydrocephalus. No extra-axial blood or fluid collections.    The brain  parenchyma appears normal. No parenchymal mass, hemorrhage, edema or major vascular distribution infarct.    Skull/extracranial contents (limited evaluation): No fracture. Mastoid air cells and paranasal sinuses are essentially clear.                               X-Ray Chest AP Portable (Final result)  Result time 06/13/22 15:50:22    Final result by Sander Gregory MD (06/13/22 15:50:22)                 Impression:      1.  Negative for acute process involving the chest.    2.  Stable and incidental findings as noted above.      Electronically signed by: Sander Gregory MD  Date:    06/13/2022  Time:    15:50             Narrative:    EXAMINATION:  XR CHEST AP PORTABLE    CLINICAL HISTORY:  Chest Pain;    COMPARISON:  Studies dating back to [place] 3, 2019    FINDINGS:  The lungs are clear. The cardiac silhouette size is normal. The trachea is midline and the mediastinal width is normal. Negative for focal infiltrate, effusion or pneumothorax. Pulmonary vasculature is normal. Negative for osseous abnormalities. Ectatic and tortuous aorta.  Degenerative changes of the spine and both shoulder girdles.  Elevation of the right hemidiaphragm.  There is retained contrast underneath the left hemidiaphragm, likely within the splenic flexure.                                Soraya Littlejohn, NP

## 2022-06-15 VITALS
HEIGHT: 69 IN | BODY MASS INDEX: 43.4 KG/M2 | SYSTOLIC BLOOD PRESSURE: 146 MMHG | HEART RATE: 55 BPM | DIASTOLIC BLOOD PRESSURE: 72 MMHG | RESPIRATION RATE: 18 BRPM | TEMPERATURE: 97 F | WEIGHT: 293 LBS | OXYGEN SATURATION: 100 %

## 2022-06-15 LAB — TROPONIN I SERPL DL<=0.01 NG/ML-MCNC: 0.02 NG/ML (ref 0–0.03)

## 2022-06-15 PROCEDURE — 84484 ASSAY OF TROPONIN QUANT: CPT | Performed by: NURSE PRACTITIONER

## 2022-06-15 PROCEDURE — 36415 COLL VENOUS BLD VENIPUNCTURE: CPT | Performed by: NURSE PRACTITIONER

## 2022-06-15 PROCEDURE — 97530 THERAPEUTIC ACTIVITIES: CPT

## 2022-06-15 PROCEDURE — 63600175 PHARM REV CODE 636 W HCPCS: Performed by: INTERNAL MEDICINE

## 2022-06-15 PROCEDURE — 93010 ELECTROCARDIOGRAM REPORT: CPT | Mod: ,,, | Performed by: INTERNAL MEDICINE

## 2022-06-15 PROCEDURE — G0378 HOSPITAL OBSERVATION PER HR: HCPCS

## 2022-06-15 PROCEDURE — 94761 N-INVAS EAR/PLS OXIMETRY MLT: CPT

## 2022-06-15 PROCEDURE — 25000242 PHARM REV CODE 250 ALT 637 W/ HCPCS: Performed by: NURSE PRACTITIONER

## 2022-06-15 PROCEDURE — 93010 EKG 12-LEAD: ICD-10-PCS | Mod: ,,, | Performed by: INTERNAL MEDICINE

## 2022-06-15 PROCEDURE — 25000003 PHARM REV CODE 250: Performed by: NURSE PRACTITIONER

## 2022-06-15 PROCEDURE — 97116 GAIT TRAINING THERAPY: CPT

## 2022-06-15 PROCEDURE — 25000003 PHARM REV CODE 250: Performed by: INTERNAL MEDICINE

## 2022-06-15 PROCEDURE — 93005 ELECTROCARDIOGRAM TRACING: CPT

## 2022-06-15 RX ORDER — MORPHINE SULFATE 4 MG/ML
4 INJECTION, SOLUTION INTRAMUSCULAR; INTRAVENOUS EVERY 4 HOURS PRN
Status: DISCONTINUED | OUTPATIENT
Start: 2022-06-15 | End: 2022-06-15 | Stop reason: HOSPADM

## 2022-06-15 RX ORDER — TIZANIDINE 4 MG/1
4 TABLET ORAL EVERY 8 HOURS PRN
Qty: 20 TABLET | Refills: 0 | Status: SHIPPED | OUTPATIENT
Start: 2022-06-15 | End: 2022-09-01 | Stop reason: SDUPTHER

## 2022-06-15 RX ORDER — ASPIRIN 325 MG
325 TABLET, DELAYED RELEASE (ENTERIC COATED) ORAL ONCE
Status: COMPLETED | OUTPATIENT
Start: 2022-06-15 | End: 2022-06-15

## 2022-06-15 RX ORDER — NITROGLYCERIN 0.4 MG/1
0.4 TABLET SUBLINGUAL EVERY 5 MIN PRN
Status: DISCONTINUED | OUTPATIENT
Start: 2022-06-15 | End: 2022-06-15 | Stop reason: HOSPADM

## 2022-06-15 RX ADMIN — NITROGLYCERIN 0.4 MG: 0.4 TABLET SUBLINGUAL at 01:06

## 2022-06-15 RX ADMIN — VENLAFAXINE HYDROCHLORIDE 150 MG: 75 CAPSULE, EXTENDED RELEASE ORAL at 08:06

## 2022-06-15 RX ADMIN — SODIUM CHLORIDE 500 ML: 0.9 INJECTION, SOLUTION INTRAVENOUS at 02:06

## 2022-06-15 RX ADMIN — APIXABAN 5 MG: 2.5 TABLET, FILM COATED ORAL at 08:06

## 2022-06-15 RX ADMIN — PREGABALIN 75 MG: 75 CAPSULE ORAL at 08:06

## 2022-06-15 RX ADMIN — ASPIRIN 325 MG: 325 TABLET, COATED ORAL at 02:06

## 2022-06-15 RX ADMIN — MORPHINE SULFATE 4 MG: 4 INJECTION INTRAVENOUS at 02:06

## 2022-06-15 RX ADMIN — LETROZOLE 2.5 MG: 2.5 TABLET ORAL at 08:06

## 2022-06-15 RX ADMIN — ALPRAZOLAM 0.5 MG: 0.5 TABLET ORAL at 09:06

## 2022-06-15 RX ADMIN — ATORVASTATIN CALCIUM 40 MG: 40 TABLET, FILM COATED ORAL at 08:06

## 2022-06-15 RX ADMIN — POLYETHYLENE GLYCOL 3350 17 G: 17 POWDER, FOR SOLUTION ORAL at 08:06

## 2022-06-15 NOTE — NURSING
Pt started having chest pain. Notified Leticia Yun NP  Because pt had no prns for chest pain. Leticia Yun NP ordered Morphine, Nitro and Aspirin. Stat EKG and troponin were ordered. EKG negative, troponin negative. Pt says she feel relief from the chest pain. Cards believe her chest pain is coming from her Breast Cancer.

## 2022-06-15 NOTE — PT/OT/SLP PROGRESS
Physical Therapy  Treatment    Susu Luther   MRN: [id number]   Admitting Diagnosis: Right sided weakness    PT Received On: 06/15/22  PT Start Time: 1140     PT Stop Time: 1203    PT Total Time (min): 23 min       Billable Minutes:  Gait Training 10 and Therapeutic Activity 13    Treatment Type: Treatment  PT/PTA: PT     PTA Visit Number: 0       General Precautions: Standard, fall  Orthopedic Precautions: N/A   Braces: N/A  Respiratory Status: Room air    Subjective:  Communicated with NURSE prior to session.  Pain/Comfort  Pain Rating 1: 0/10    Objective:   Patient found with: peripheral IV, telemetry    Functional Mobility:  Therapeutic Activities and Exercises:  PT FOUND SUPINE IN BED UPON ARRIVAL, AGREEABLE TO TX., SUP>SIT WITH SBA, SEATED SCOOT TO EOB WITH SBA, REVIEW RW USE AND SAFETY DURING TF'S AND GAIT, SIT>STAND WITH CGA, PT AMB 20' WITH RW AND CGA, SLOW PACE, CUES FOR UPRIGHT POSTURE AND RW SAFETY, PT QUICK TO FATIGUE, DECLINED FURTHER DISTANCE, F DYNAMIC BALANCE, TF TO CHAIR WITH CGA, REVIEW BLE THEREX TO PERFORM WHILE SEATED IN CHAIR: HIP FLEX/EXT, LAQ, AP'S.  PT SETUP FOR LUNCH    AM-PAC 6 CLICK MOBILITY  How much help from another person does this patient currently need?   1 = Unable, Total/Dependent Assistance  2 = A lot, Maximum/Moderate Assistance  3 = A little, Minimum/Contact Guard/Supervision  4 = None, Modified [place]/Independent    Turning over in bed (including adjusting bedclothes, sheets and blankets)?: 4  Sitting down on and standing up from a chair with arms (e.g., wheelchair, bedside commode, etc.): 3  Moving from lying on back to sitting on the side of the bed?: 4  Moving to and from a bed to a chair (including a wheelchair)?: 3  Need to walk in hospital room?: 3  Climbing 3-5 steps with a railing?: 1  Basic Mobility Total Score: 18    AM-PAC Raw Score CMS G-Code Modifier Level of Impairment Assistance   6 % Total / Unable   7 - 9 CM 80 - 100% Maximal Assist   10 -  14 CL 60 - 80% Moderate Assist   15 - 19 CK 40 - 60% Moderate Assist   20 - 22 CJ 20 - 40% Minimal Assist   23 CI 1-20% SBA / CGA   24 CH 0% Independent/ Mod I     Patient left up in chair with all lines intact, call button in reach, chair alarm on, NURSE notified and  present.    Assessment:  Susu Luther is a 72 y.o. female with a medical diagnosis of Right sided weakness and presents with IMPAIRED FUNCTIONAL MOBILITY. PT WILL BENEFIT FROM CONT. SKILLED P.T. TO ADDRESS IMPAIRMENTS    Rehab identified problem list/impairments: Rehab identified problem list/impairments: weakness, impaired endurance, impaired functional mobilty, gait instability, impaired balance, decreased safety awareness, decreased lower extremity function, decreased coordination    Rehab potential is good.    Activity tolerance: Good    Discharge recommendations: Discharge Facility/Level of Care Needs: home health PT     Barriers to discharge:      Equipment recommendations: Equipment Needed After Discharge: none     GOALS:   Multidisciplinary Problems     Physical Therapy Goals        Problem: Physical Therapy    Goal Priority Disciplines Outcome Goal Variances Interventions   Physical Therapy Goal     PT, PT/OT Ongoing, Progressing     Description: LTG'S TO BE MET IN 14 DAYS (6-28-22)  1. PT WILL BE SERVANDO WITH BED MOBILITY  2. PT WILL REQUIRE SBA FOR TF'S  3. PT WILL ' WITH RW AND SBA                   PLAN:    Patient to be seen 3 x/week  to address the above listed problems via gait training, therapeutic activities, therapeutic exercises  Plan of Care expires: 06/28/22  Plan of Care reviewed with: patient, spouse    06/15/2022

## 2022-06-15 NOTE — ASSESSMENT & PLAN NOTE
----- Message from Luis Montgomery MD sent at 3/29/2017 11:14 AM CDT -----  This is to inform you about the results of the above test. The results are mildly abnormal. Further details as to be reviewed on next office visit.   No cough or hypoxia.  She does have shortness of breath associated with her chest pain.  Continue Apixaban.    CTA negative for PE

## 2022-06-15 NOTE — DISCHARGE SUMMARY
[facility] - Telemetry ([facility] Medicine  Discharge Summary      Patient Name: Susu Luther  MRN: [id number]  Patient Class: OP- Observation  Admission Date: 6/13/2022  Hospital Length of Stay: 0 days  Discharge Date and Time: No discharge date for patient encounter.  Attending Physician: Leif Delgadillo, *   Discharging Provider: Soraya Littlejohn NP  Primary Care Provider: Peace Arias MD      HPI:   Ms. Luther came to the ED for evaluation of right side chest pain and right arm weakness with word finding difficulty.  The chest pain had been on and off for 2 weeks.  Right side weakness and word finding difficulty started today.  The chest pain is associated with shortness of breath and dizziness.  She denies any fevers or chills, nausea or vomiting.  No other recent illness but she is starting workup of a right breast mass that is worrisome for recurrence of breast cancer.  She is compliant with taking Apixaban for DVT/PE.  Full code and her  is her surrogate decision maker.      * No surgery found *      Hospital Course:   6/14-- Susu Luther was placed into observation this morning for TANNER, chest pain, and TIA/CVA work up. Kidney functioning has improved. Cardiac enzymes negative. Last ECHO in 2/2022-EF 60%. Right sided weakness and word finding difficulties present- MRI Brain and Carotid US pending.  Patient endorsing chest pressure and shortness of breath as well as right upper leg pain. Patient with extensive history of thrombus including PE, DVT on daily apixaban of which she reports compliance with. Additionally, has a history of breast cancer with a right sided subpectoral mass with adjacent lymph nodes noted on previous imaging. Will obtain CTA of chest pain could be from this mass vs PE. Continue OAC. Permissive HTN until MRI results. Labs reviewed and WNL. PT/OT/SLP consults.  consulted for assistance with rehab placement- patient may possibly  need/benefit from acute rehab.    06/15/2022- MRI with NAF, US was negative for DVT, CTA negative for PE, Cardiac work up negative. Atypical chest pains could likely be from her known mass vs musculoskeletal. Patient has a follow up scheduled with PCP, Neuro, Cards, Pulm, and hem/onc. Patient discharging with resumption of home medications. Refill of Zanaflex provided for musculoskeletal pain relief. PT/OT recommended HH- ordered. Patient is AAOx3 and is stable and appropriate for discharge.       Goals of Care Treatment Preferences:  Code Status: Full Code      Consults:   Consults (From admission, onward)        Status Ordering Provider     Inpatient consult to Social Work  Once        Provider:  (Not yet assigned)    Acknowledged AUREA DESHPANDE          * Right sided weakness  Acute right arm weakness and word finding difficulty concerning for TIA/Stroke.  She has right arm weakness on exam and some delay answering questions but speech is normal.  She says the weakness has improved from its worst.  CT head is normal.  Check Brain MRI.    06/15/2022 MRI negative, HH ordered    History of pulmonary embolism  No cough or hypoxia.  She does have shortness of breath associated with her chest pain.  Continue Apixaban.    CTA negative for PE    TANNER (acute kidney injury)  CKD3 at baseline with a creatinine usually about 1.3.  Holding Telmisartan, Furosemide, and HCTZ.  Follow renal function chemistries and urine output.    06/15/2022 resume home treatment, f/u OP as scheduled    Other chest pain  Atypical substernal to right sided chest pain and right facial pain.  Initial cardiac evaluation is normal.  Continue EKG as needed and trend cardiac enzymes.  She follows outpatient with Dr. Hobbs.    06/15/2022 resume home treatment, f/u OP with Cards as scheduled    Hypertension  Continue Amlodipine, Doxazosin, and Hydralazine.  06/15/2022 resume home treatment, f/u OP with PCP as scheduled    NILS (obstructive sleep  apnea)  CPAP nightly.  06/15/2022 resume home treatment, f/u OP with pulm as scheduled      Final Active Diagnoses:    Diagnosis Date Noted POA    PRINCIPAL PROBLEM:  Right sided weakness [R53.1] 06/13/2022 Yes    History of pulmonary embolism [Z86.711] 04/28/2022 Yes    TANNER (acute kidney injury) [N17.9] 02/15/2022 Yes    Other chest pain [R07.89] 02/15/2022 Yes    Hypertension [I10]  Yes    NILS (obstructive sleep apnea) [G47.33] 11/28/2018 Yes     Chronic      Problems Resolved During this Admission:       Discharged Condition: good    Disposition: Home-Health Care Svc    Follow Up:   Follow-up Information     Peace Arias MD Follow up.    Specialty: Internal Medicine  Why: As Scheduled  Contact information:  [address] [place] [address] [address] [address]  [phone]             Jose Clarke MD Follow up.    Specialty: Hematology and Oncology  Why: As Scheduled  Contact information:  [address] [facility] [place] [address]  [phone]             Arlene Hobbs MD Follow up.    Specialty: Cardiology  Why: As Scheduled  Contact information:  [address] [place] [address]  [phone]             Tray Razo MD Follow up.    Specialty: Neurology  Why: As Scheduled  Contact information:  [address] [place] [address] [address] [address]  [phone]                       Patient Instructions:      Ambulatory referral/consult to Home Health   Standing Status: Future   Referral Priority: Routine Referral Type: Home Health Care   Referral Reason: Specialty Services Required   Requested Specialty: Home Health Services   Number of Visits Requested: 1     Notify your health care provider if you experience any of the following:  temperature >100.4     Notify your health care provider if you experience any of the following:  persistent nausea and vomiting or diarrhea     Notify your health care provider if you experience any of the following:  severe uncontrolled pain     Notify your health  care provider if you experience any of the following:  redness, tenderness, or signs of infection (pain, swelling, redness, odor or green/yellow discharge around incision site)     Notify your health care provider if you experience any of the following:  difficulty breathing or increased cough     Notify your health care provider if you experience any of the following:  severe persistent headache     Notify your health care provider if you experience any of the following:  worsening rash     Notify your health care provider if you experience any of the following:  persistent dizziness, light-headedness, or visual disturbances     Activity as tolerated       Significant Diagnostic Studies: Labs:   CMP   Recent Labs   Lab 06/13/22  1600 06/14/22  0518    141   K 4.4 3.9    109   CO2 23 23   * 114*   BUN 32* 26*   CREATININE 2.1* 1.4   CALCIUM 9.3 8.8   PROT 7.8  --    ALBUMIN 3.3* 2.8*   BILITOT 0.3  --    ALKPHOS 114  --    AST 13  --    ALT 9*  --    ANIONGAP 12 9   ESTGFRAFRICA 27* 44*   EGFRNONAA 23* 38*   , CBC   Recent Labs   Lab 06/13/22  1600 06/14/22  0518   WBC 9.21 8.84   HGB 11.2* 10.3*   HCT 36.7* 33.8*    235    and All labs within the past 24 hours have been reviewed    Pending Diagnostic Studies:     None         Medications:  Reconciled Home Medications:      Medication List      CHANGE how you take these medications    cholecalciferol (vitamin D3) 1,250 mcg (50,000 unit) capsule  Take 1 capsule (50,000 Units total) by mouth once a week.  What changed: additional instructions        CONTINUE taking these medications    albuterol 90 mcg/actuation inhaler  Commonly known as: VENTOLIN HFA  Inhale 2 puffs into the lungs every 4 (four) hours as needed for Shortness of Breath.     alcohol swabs Padm  Commonly known as: BD ALCOHOL SWABS  Apply 1 each topically once daily.     ALPRAZolam 0.5 MG tablet  Commonly known as: XANAX  Take 1 tablet (0.5 mg total) by mouth 2 (two) times  daily as needed for Insomnia or Anxiety.     amLODIPine 10 MG tablet  Commonly known as: NORVASC  Take 1 tablet (10 mg total) by mouth once daily.     apixaban 5 mg Tab  Commonly known as: ELIQUIS  Take 1 tablet (5 mg total) by mouth 2 (two) times daily.     atorvastatin 40 MG tablet  Commonly known as: LIPITOR  Take 1 tablet (40 mg total) by mouth once daily.     blood glucose control, normal Soln  1 Bottle by Misc.(Non-Drug; Combo Route) route daily as needed.     blood sugar diagnostic Strp  1 strip by Misc.(Non-Drug; Combo Route) route 2 (two) times daily.     blood-glucose meter Misc  Commonly known as: ACCU-CHEK JENNIFER PLUS METER  1 each by Misc.(Non-Drug; Combo Route) route 2 (two) times a day.     diclofenac sodium 1 % Gel  Commonly known as: VOLTAREN  Apply 2 grams topically once daily.     doxazosin 4 MG tablet  Commonly known as: CARDURA  Take 1 tablet (4 mg total) by mouth every evening.     furosemide 20 MG tablet  Commonly known as: LASIX  take 1/2 tablet by mouth once a day.     hydrALAZINE 100 MG tablet  Commonly known as: APRESOLINE  Take 100 mg by mouth every 8 (eight) hours.     hydroCHLOROthiazide 50 MG tablet  Commonly known as: HYDRODIURIL  Take 1 tablet (50 mg total) by mouth once daily.     hydrOXYzine HCL 25 MG tablet  Commonly known as: ATARAX  Take 1 tablet (25 mg total) by mouth every evening.     lancets Misc  Commonly known as: ACCU-CHEK SOFTCLIX LANCETS  1 each by Misc.(Non-Drug; Combo Route) route 2 (two) times a day.     letrozole 2.5 mg Tab  Commonly known as: FEMARA  Take 1 tablet by mouth once daily     LIDOcaine 5 %  Commonly known as: LIDODERM  Place 2 patches onto the skin once daily. Remove & Discard patch within 12 hours or as directed by MD     multivitamin per tablet  Commonly known as: THERAGRAN  Take 1 tablet by mouth once daily.     oxyCODONE-acetaminophen 7.5-325 mg per tablet  Commonly known as: PERCOCET  Take 1 tablet by mouth every 8 (eight) hours as needed for  Pain.     pregabalin 75 MG capsule  Commonly known as: LYRICA  Take 1 capsule (75 mg total) by mouth 3 (three) times daily.     propranoloL 40 MG tablet  Commonly known as: INDERAL  Take 1 tablet by mouth twice daily     telmisartan 80 MG Tab  Commonly known as: MICARDIS  TAKE 1 TABLET BY MOUTH ONCE DAILY REPLACES  LOSARTAN     tiZANidine 4 MG tablet  Commonly known as: ZANAFLEX  Take 1 tablet (4 mg total) by mouth every 8 (eight) hours as needed (muscle spasm).     venlafaxine 150 MG Cp24  Commonly known as: EFFEXOR-XR  TAKE 1 CAPSULE EVERY DAY     zolpidem 5 MG Tab  Commonly known as: AMBIEN  Take 1 tablet (5 mg total) by mouth nightly as needed (sleep).            Indwelling Lines/Drains at time of discharge:   Lines/Drains/Airways     Drain  Duration                Closed/Suction Drain 03/02/21 1625 Right Breast Bulb 19 Fr. 469 days                Time spent on the discharge of patient: >35 minutes         Soraya Littlejohn NP  Department of Hospital Medicine  [facility] - Telemetry ([facility])

## 2022-06-15 NOTE — ASSESSMENT & PLAN NOTE
Continue Amlodipine, Doxazosin, and Hydralazine.  06/15/2022 resume home treatment, f/u OP with PCP as scheduled

## 2022-06-15 NOTE — HOSPITAL COURSE
6/14-- Susu Luther was placed into observation this morning for TANNER, chest pain, and TIA/CVA work up. Kidney functioning has improved. Cardiac enzymes negative. Last ECHO in 2/2022-EF 60%. Right sided weakness and word finding difficulties present- MRI Brain and Carotid US pending.  Patient endorsing chest pressure and shortness of breath as well as right upper leg pain. Patient with extensive history of thrombus including PE, DVT on daily apixaban of which she reports compliance with. Additionally, has a history of breast cancer with a right sided subpectoral mass with adjacent lymph nodes noted on previous imaging. Will obtain CTA of chest pain could be from this mass vs PE. Continue OAC. Permissive HTN until MRI results. Labs reviewed and WNL. PT/OT/SLP consults.  consulted for assistance with rehab placement- patient may possibly need/benefit from acute rehab.    06/15/2022- MRI with NAF, US was negative for DVT, CTA negative for PE, Cardiac work up negative. Atypical chest pains could likely be from her known mass vs musculoskeletal. Patient has a follow up scheduled with PCP, Neuro, Cards, Pulm, and hem/onc. Patient discharging with resumption of home medications. Refill of Zanaflex provided for musculoskeletal pain relief. PT/OT recommended HH- ordered. Patient is AAOx3 and is stable and appropriate for discharge.

## 2022-06-15 NOTE — ASSESSMENT & PLAN NOTE
CKD3 at baseline with a creatinine usually about 1.3.  Holding Telmisartan, Furosemide, and HCTZ.  Follow renal function chemistries and urine output.    06/15/2022 resume home treatment, f/u OP as scheduled

## 2022-06-15 NOTE — PLAN OF CARE
Patient was discharge without home health being set up.    Called patient.  Preference obtained for [facility].  Referral sent via [facility].         06/15/22 3304   Post-Acute Status   Post-Acute Authorization Home Health   Home Health Status Set-up Complete/Auth obtained   Discharge Plan   Discharge Plan A Home Health

## 2022-06-15 NOTE — ASSESSMENT & PLAN NOTE
Atypical substernal to right sided chest pain and right facial pain.  Initial cardiac evaluation is normal.  Continue EKG as needed and trend cardiac enzymes.  She follows outpatient with Dr. Hobbs.    06/15/2022 resume home treatment, f/u OP with Cards as scheduled

## 2022-06-15 NOTE — NURSING
Pt discharged per MD orders. Went over discharge instructions with patient, patient verbalized understanding. IV removed, tip intact. Tele monitor removed and replaced back in tele room. Pt transporrted to car via wheelchair.

## 2022-06-15 NOTE — PLAN OF CARE
Pt remains fall free this shift.  Pt AAOx4, verbal, clear speech.  Skin warm and dry. No new skin issues.  Telemonitoring in progress, (SB low 50s)  Room air  BSC  Standby assist with transfers.  Rehab placement   Cards consult for chest pain and SB  Bed low, side rails up x 2, wheels locked, call light in reach.  Bed alarm maintained for safety.  Patient instructed to call for assistance.  Hourly rounding completed.  24 hour chart check completed  POC updated and reviewed with pt. Will continue POC.

## 2022-06-15 NOTE — PLAN OF CARE
[facility] - Telemetry (Hospital)  Discharge Final Note    Primary Care Provider: Peace Arias MD    Expected Discharge Date: 6/15/2022    Final Discharge Note (most recent)     Final Note - 06/15/22 1450        Final Note    Assessment Type Final Discharge Note     Anticipated Discharge Disposition Home-Health Care Svc        Post-Acute Status    Post-Acute Authorization Home Health     Home Health Status Set-up Complete/Auth obtained     Discharge Delays None known at this time                 Important Message from Medicare             Contact Info     Peace Arias MD   Specialty: Internal Medicine   Relationship: PCP - General    [address] [place] [address] [address] [place] [address]   Phone: [phone]       Next Steps: Follow up    Instructions: As Scheduled    Jose Clarke MD   Specialty: Hematology and Oncology   Relationship: Consulting Physician    [address] [facility]  [place] [address]   Phone: [phone]       Next Steps: Follow up    Instructions: As Scheduled    Arlene Hobbs MD   Specialty: Cardiology   Relationship: Consulting Physician    [address] [facility]  [place] [address]   Phone: [phone]       Next Steps: Follow up    Instructions: As Scheduled    Tray Razo MD   Specialty: Neurology    [address] [place] [address] [address] [address]   Phone: [phone]       Next Steps: Follow up    Instructions: As Scheduled

## 2022-06-15 NOTE — ASSESSMENT & PLAN NOTE
Acute right arm weakness and word finding difficulty concerning for TIA/Stroke.  She has right arm weakness on exam and some delay answering questions but speech is normal.  She says the weakness has improved from its worst.  CT head is normal.  Check Brain MRI.    06/15/2022 MRI negative,  ordered

## 2022-06-16 ENCOUNTER — OUTPATIENT CASE MANAGEMENT (OUTPATIENT)
Dept: ADMINISTRATIVE | Facility: OTHER | Age: 72
End: 2022-06-16
Payer: MEDICARE

## 2022-06-16 ENCOUNTER — PATIENT MESSAGE (OUTPATIENT)
Dept: INTERNAL MEDICINE | Facility: CLINIC | Age: 72
End: 2022-06-16
Payer: MEDICARE

## 2022-06-16 DIAGNOSIS — Z17.0 MALIGNANT NEOPLASM OF UPPER-OUTER QUADRANT OF RIGHT BREAST IN FEMALE, ESTROGEN RECEPTOR POSITIVE: Chronic | ICD-10-CM

## 2022-06-16 DIAGNOSIS — F41.9 ANXIETY: ICD-10-CM

## 2022-06-16 DIAGNOSIS — F32.1 MAJOR DEPRESSIVE DISORDER, SINGLE EPISODE, MODERATE WITH ANXIOUS DISTRESS: ICD-10-CM

## 2022-06-16 DIAGNOSIS — I10 ESSENTIAL HYPERTENSION: ICD-10-CM

## 2022-06-16 DIAGNOSIS — C50.411 MALIGNANT NEOPLASM OF UPPER-OUTER QUADRANT OF RIGHT BREAST IN FEMALE, ESTROGEN RECEPTOR POSITIVE: Chronic | ICD-10-CM

## 2022-06-16 NOTE — PROGRESS NOTES
Outpatient Care Management   - Low Risk Patient Assessment    Patient: Susu Luther  MRN:  [id number]  Date of Service:  6/16/2022  Completed by:  Fern Danielson LCSW  Referral Date: 06/08/2022  Program: OPCM Low Risk    Reason for Visit   Patient presents with    Social Work Assessment - Low/Mod Risk    Plan Of Care    Case Closure     Brief Summary: Sw received referral for patient from Venita Quijano NP. Sw completed low risk assessment with Pt via telephone. Pt reports living with spouse and reports needing assistance with ADLs. Pt has cane and walker at home for use. Pt reports she was discharged from hospital yesterday for a stroke. Pt confirms she is admitted for home health services with [facility]. Pt denies having difficulty affording food, housing, medications or medical care. Pt reports spouse provides transportation to medical appointments. Pt has hospital follow up visit scheduled with PCP. Pt reports primary need right now is assistance with cleaning her home. Sw offered to provide Pt with list of homemaker/housing keeping companies and Pt voiced agreement with plan. Pt requested Sw send resources to her e-mail address on file ([email or web address]). No other needs identified at this time case closed.     Patient Summary     OPCM Social Work Assessment (Low/Moderate Risk)    General  Level of Caregiver support: Caregiver currently provides assistance  Have you had to make a decision between paying for any of the following in the last 2 months?: None  Transportation means: Family  Employment status: Retired and not working  Do you have any of the following?: Living will, Medical power of   Current symptoms: Sleep disturbances  Assessments  Was the PHQ Depression Screening completed this visit?: No  Was the CLARA-7 Screening completed this visit?: No         Complex Care Plan     Care plan was discussed and completed today with input from patient and/or caregiver.    Patient  Instructions     No follow-ups on file.    Todays OPCM Self-Management Care Plan was developed with the patients/caregivers input and was based on identified barriers from todays assessment.  Goals were written today with the patient/caregiver and the patient has agreed to work towards these goals to improve his/her overall well-being. Patient verbalized understanding of the care plan, goals, and all of today's instructions. Encouraged patient/caregiver to communicate with his/her physician and health care team about health conditions and the treatment plan.  Provided my contact information today and encouraged patient/caregiver to call me with any questions as needed.

## 2022-06-17 DIAGNOSIS — I10 ESSENTIAL HYPERTENSION: ICD-10-CM

## 2022-06-17 RX ORDER — VENLAFAXINE HYDROCHLORIDE 150 MG/1
150 CAPSULE, EXTENDED RELEASE ORAL DAILY
Qty: 90 CAPSULE | Refills: 0 | Status: SHIPPED | OUTPATIENT
Start: 2022-06-17 | End: 2022-07-06

## 2022-06-17 RX ORDER — AMLODIPINE BESYLATE 10 MG/1
10 TABLET ORAL DAILY
Qty: 90 TABLET | Refills: 0 | Status: SHIPPED | OUTPATIENT
Start: 2022-06-17 | End: 2022-07-11

## 2022-06-17 RX ORDER — DOXAZOSIN 4 MG/1
4 TABLET ORAL NIGHTLY
Qty: 90 TABLET | Refills: 0 | Status: ON HOLD | OUTPATIENT
Start: 2022-06-17 | End: 2023-03-15 | Stop reason: HOSPADM

## 2022-06-17 NOTE — TELEPHONE ENCOUNTER
No new care gaps identified.  [facility] Embedded Care Gaps. Reference number: [id number]. 6/17/2022   9:58:30 AM CDT

## 2022-06-17 NOTE — TELEPHONE ENCOUNTER
No new care gaps identified.  [facility] Embedded Care Gaps. Reference number: [id number]. 6/17/2022   9:59:24 AM CDT

## 2022-06-19 RX ORDER — PROPRANOLOL HYDROCHLORIDE 40 MG/1
40 TABLET ORAL 2 TIMES DAILY
Qty: 60 TABLET | Refills: 5 | Status: SHIPPED | OUTPATIENT
Start: 2022-06-19 | End: 2022-07-11

## 2022-06-20 ENCOUNTER — TELEPHONE (OUTPATIENT)
Dept: INTERNAL MEDICINE | Facility: CLINIC | Age: 72
End: 2022-06-20
Payer: MEDICARE

## 2022-06-20 NOTE — TELEPHONE ENCOUNTER
----- Message from Alia Navathania sent at 6/20/2022  1:27 PM CDT -----  Contact: Gary @ [phone]  Requesting an RX refill or new RX.  Is this a refill or new RX:   RX name and strength propranoloL (INDERAL) 40 MG tablet  Is this a 30 day or 90 day RX:   Pharmacy name and phone # [facility] ([facility] - [facility] [address]  The doctors have asked that we provide their patients with the following 2 reminders -- prescription refills can take up to 72 hours, and a friendly reminder that in the future you can use your [facility] account to request refills: na        Requesting an RX refill or new RX.  Is this a refill or new RX:   RX name and strength tiZANidine (ZANAFLEX) 4 MG tablet  Is this a 30 day or 90 day RX:   Pharmacy name and phone #[facility] ([facility]) - [facility] [address]  The doctors have asked that we provide their patients with the following 2 reminders -- prescription refills can take up to 72 hours, and a friendly reminder that in the future you can use your [facility] account to request refills: na          Requesting an RX refill or new RX.  Is this a refill or new RX:   RX name and strength pregabalin (LYRICA) 75 MG capsule  Is this a 30 day or 90 day RX:   Pharmacy name and phone # [facility] ([facility]) - [facility] [address]  The doctors have asked that we provide their patients with the following 2 reminders -- prescription refills can take up to 72 hours, and a friendly reminder that in the future you can use your [facility] account to request refills: na      Requesting an RX refill or new RX.  Is this a refill or new RX:   RX name and strength albuterol (VENTOLIN HFA) 90 mcg/actuation inhaler  Is this a 30 day or 90 day RX:   Pharmacy name and phone # [facility] ([facility]) - [place] [id number]   The doctors have asked that we provide their patients with the following 2 reminders -- prescription refills can take up to 72 hours, and a friendly reminder that in the future you can use your [facility] account to request refills: na        Requesting an RX refill or new RX.  Is this a refill or new RX:   RX name and strength atorvastatin (LIPITOR) 40 MG tablet  Is this a 30 day or 90 day RX:   Pharmacy name and phone #[facility] ([facility]) - [place] [id number]   The doctors have asked that we provide their patients with the following 2 reminders -- prescription refills can take up to 72 hours, and a friendly reminder that in the future you can use your [facility] account to request refills: na    Requesting an RX refill or new RX.  Is this a refill or new RX:   RX name and strength doxazosin (CARDURA) 4 MG tablet  Is this a 30 day or 90 day RX:   Pharmacy name and phone #[facility] ([facility]) - [place] [id number]   The doctors have asked that we provide their patients with the following 2 reminders -- prescription refills can take up to 72 hours, and a friendly reminder that in the future you can use your [facility] account to request refills: na      Requesting an RX refill or new RX.  Is this a refill or new RX:   RX name and strength hydrALAZINE (APRESOLINE) 100 MG tablet  Is this a 30 day or 90 day RX:   Pharmacy name and phone #[facility] ([facility]) - [place] [id number]   The doctors have asked that we provide their patients with the following 2 reminders -- prescription refills can take up to 72 hours, and a friendly reminder that in the future you can use your [facility] account to request refills: na

## 2022-06-21 ENCOUNTER — PATIENT MESSAGE (OUTPATIENT)
Dept: INTERNAL MEDICINE | Facility: CLINIC | Age: 72
End: 2022-06-21
Payer: MEDICARE

## 2022-06-23 ENCOUNTER — TELEPHONE (OUTPATIENT)
Dept: INTERNAL MEDICINE | Facility: CLINIC | Age: 72
End: 2022-06-23
Payer: MEDICARE

## 2022-06-23 ENCOUNTER — PATIENT MESSAGE (OUTPATIENT)
Dept: CARDIOLOGY | Facility: CLINIC | Age: 72
End: 2022-06-23
Payer: MEDICARE

## 2022-06-23 NOTE — TELEPHONE ENCOUNTER
----- Message from Fay Johnston sent at 6/23/2022 12:57 PM CDT -----  Contact: [facility] patient home health nurse  [facility] patient home health nurse would like to consult with a nurse in regards to a verbal order for speech therapy. Please call back at [phone]. Thanks r/s

## 2022-06-23 NOTE — TELEPHONE ENCOUNTER
FYI:   states the patient has discharged from [facility] and has noticed some slurring with her speech even though she wasn't dx with a stroke.  Gave a verbal ok to lolis for speech therapy

## 2022-06-29 ENCOUNTER — EXTERNAL HOME HEALTH (OUTPATIENT)
Dept: HOME HEALTH SERVICES | Facility: HOSPITAL | Age: 72
End: 2022-06-29
Payer: MEDICARE

## 2022-06-29 ENCOUNTER — PATIENT MESSAGE (OUTPATIENT)
Dept: INTERNAL MEDICINE | Facility: CLINIC | Age: 72
End: 2022-06-29
Payer: MEDICARE

## 2022-06-30 ENCOUNTER — PATIENT MESSAGE (OUTPATIENT)
Dept: ADMINISTRATIVE | Facility: OTHER | Age: 72
End: 2022-06-30
Payer: MEDICARE

## 2022-06-30 ENCOUNTER — TELEPHONE (OUTPATIENT)
Dept: RHEUMATOLOGY | Facility: CLINIC | Age: 72
End: 2022-06-30
Payer: MEDICARE

## 2022-06-30 NOTE — TELEPHONE ENCOUNTER
----- Message from Breann Andrade sent at 6/30/2022 10:54 AM CDT -----  Contact: [phone]  Pt was scheduled incorrectly at the [facility] location and her appt cx'ed at [facility]. This was done she states without her consent. She does not feel it is okay that she now has to wait months to be seen when her appt was cx'ed and rs at a location she could not go to. Can you please call pt and discuss her getting into be seen quickly?

## 2022-06-30 NOTE — TELEPHONE ENCOUNTER
Spoke with patient . She had a stroke in June around the time of her follow up. Offered appointment with Angie Romano PA-C on 7-6. Patient declined will see Dr. Adamson only. pateint scheduled for 9-2 with Dr. Adamson.

## 2022-07-01 ENCOUNTER — OFFICE VISIT (OUTPATIENT)
Dept: INTERNAL MEDICINE | Facility: CLINIC | Age: 72
End: 2022-07-01
Payer: MEDICARE

## 2022-07-01 VITALS — SYSTOLIC BLOOD PRESSURE: 86 MMHG | DIASTOLIC BLOOD PRESSURE: 68 MMHG | HEART RATE: 73 BPM

## 2022-07-01 DIAGNOSIS — Z09 HOSPITAL DISCHARGE FOLLOW-UP: Primary | ICD-10-CM

## 2022-07-01 DIAGNOSIS — M79.601 RIGHT ARM PAIN: ICD-10-CM

## 2022-07-01 DIAGNOSIS — M79.604 BILATERAL LEG PAIN: ICD-10-CM

## 2022-07-01 DIAGNOSIS — M79.605 BILATERAL LEG PAIN: ICD-10-CM

## 2022-07-01 DIAGNOSIS — Z17.0 MALIGNANT NEOPLASM OF UPPER-OUTER QUADRANT OF RIGHT BREAST IN FEMALE, ESTROGEN RECEPTOR POSITIVE: Chronic | ICD-10-CM

## 2022-07-01 DIAGNOSIS — F41.9 ANXIETY: ICD-10-CM

## 2022-07-01 DIAGNOSIS — C50.411 MALIGNANT NEOPLASM OF UPPER-OUTER QUADRANT OF RIGHT BREAST IN FEMALE, ESTROGEN RECEPTOR POSITIVE: Chronic | ICD-10-CM

## 2022-07-01 PROCEDURE — 3074F SYST BP LT 130 MM HG: CPT | Mod: CPTII,95,, | Performed by: FAMILY MEDICINE

## 2022-07-01 PROCEDURE — 3078F DIAST BP <80 MM HG: CPT | Mod: CPTII,95,, | Performed by: FAMILY MEDICINE

## 2022-07-01 PROCEDURE — 99214 PR OFFICE/OUTPT VISIT, EST, LEVL IV, 30-39 MIN: ICD-10-PCS | Mod: 95,,, | Performed by: FAMILY MEDICINE

## 2022-07-01 PROCEDURE — 4010F PR ACE/ARB THEARPY RXD/TAKEN: ICD-10-PCS | Mod: CPTII,95,, | Performed by: FAMILY MEDICINE

## 2022-07-01 PROCEDURE — 3078F PR MOST RECENT DIASTOLIC BLOOD PRESSURE < 80 MM HG: ICD-10-PCS | Mod: CPTII,95,, | Performed by: FAMILY MEDICINE

## 2022-07-01 PROCEDURE — 3074F PR MOST RECENT SYSTOLIC BLOOD PRESSURE < 130 MM HG: ICD-10-PCS | Mod: CPTII,95,, | Performed by: FAMILY MEDICINE

## 2022-07-01 PROCEDURE — 4010F ACE/ARB THERAPY RXD/TAKEN: CPT | Mod: CPTII,95,, | Performed by: FAMILY MEDICINE

## 2022-07-01 PROCEDURE — 99214 OFFICE O/P EST MOD 30 MIN: CPT | Mod: 95,,, | Performed by: FAMILY MEDICINE

## 2022-07-01 RX ORDER — OXYCODONE AND ACETAMINOPHEN 7.5; 325 MG/1; MG/1
1 TABLET ORAL EVERY 8 HOURS PRN
Qty: 60 TABLET | Refills: 0 | Status: SHIPPED | OUTPATIENT
Start: 2022-07-01 | End: 2022-07-06

## 2022-07-01 RX ORDER — ALPRAZOLAM 0.5 MG/1
0.5 TABLET ORAL 2 TIMES DAILY PRN
Qty: 60 TABLET | Refills: 0 | Status: SHIPPED | OUTPATIENT
Start: 2022-07-01 | End: 2022-07-06 | Stop reason: ALTCHOICE

## 2022-07-01 NOTE — PROGRESS NOTES
The patient location is: [place] (home)  The chief complaint leading to consultation is: follow up stroke/hypotension     Visit type: audiovisual    Face to Face time with patient: 26 minutes  26 minutes of total time spent on the encounter, which includes face to face time and non-face to face time preparing to see the patient (eg, review of tests), Obtaining and/or reviewing separately obtained history, Documenting clinical information in the electronic or other health record, Independently interpreting results (not separately reported) and communicating results to the patient/family/caregiver, or Care coordination (not separately reported).         Each patient to whom he or she provides medical services by telemedicine is:  (1) informed of the relationship between the physician and patient and the respective role of any other health care provider with respect to management of the patient; and (2) notified that he or she may decline to receive medical services by telemedicine and may withdraw from such care at any time.    Notes:   Subjective:       Patient ID: Susu Luther is a 72 y.o. female.    Chief Complaint: No chief complaint on file.    HPI Ms. Luther presents today for follow up.  not feeling well this morning blood pressure has been low  She has held her medication for the past week.  Speech   Headaches   Pain     Had a stroke 6/13/2022 while in the clinic with Cardiology and went to hospital after this, discharge summary below. She was in observation.   Believe her stroke was from her cancer and blood pressure.    Home health with PT/OT and Speech therapy    Drinking a lot of juice and water  Eating potato chips as directed  Feels her speech is getting worse.     Patient states she is scared   Very sad and depressed  She is trying to do what she normally does but she gets short of breath and tired.     Right arm and hand weakness   Gabapentin   lyrica not helping   HPI:   Ms. Luther came  to the ED for evaluation of right side chest pain and right arm weakness with word finding difficulty.  The chest pain had been on and off for 2 weeks.  Right side weakness and word finding difficulty started today.  The chest pain is associated with shortness of breath and dizziness.  She denies any fevers or chills, nausea or vomiting.  No other recent illness but she is starting workup of a right breast mass that is worrisome for recurrence of breast cancer.  She is compliant with taking Apixaban for DVT/PE.  Full code and her  is her surrogate decision maker.           Hospital Course:   6/14-- Susu Luther was placed into observation this morning for TANNER, chest pain, and TIA/CVA work up. Kidney functioning has improved. Cardiac enzymes negative. Last ECHO in 2/2022-EF 60%. Right sided weakness and word finding difficulties present- MRI Brain and Carotid US pending.  Patient endorsing chest pressure and shortness of breath as well as right upper leg pain. Patient with extensive history of thrombus including PE, DVT on daily apixaban of which she reports compliance with. Additionally, has a history of breast cancer with a right sided subpectoral mass with adjacent lymph nodes noted on previous imaging. Will obtain CTA of chest pain could be from this mass vs PE. Continue OAC. Permissive HTN until MRI results. Labs reviewed and WNL. PT/OT/SLP consults.  consulted for assistance with rehab placement- patient may possibly need/benefit from acute rehab.     06/15/2022- MRI with [facility], US was negative for DVT, CTA negative for PE, Cardiac work up negative. Atypical chest pains could likely be from her known mass vs musculoskeletal. Patient has a follow up scheduled with PCP, Neuro, Cards, Pulm, and hem/onc. Patient discharging with resumption of home medications. Refill of Zanaflex provided for musculoskeletal pain relief. PT/OT recommended HH- ordered. Patient is AAOx3 and is stable and  appropriate for discharge.    Review of Systems   HENT: Negative for ear pain, rhinorrhea and sore throat.    Respiratory: Positive for shortness of breath and wheezing.    Cardiovascular: Positive for chest pain and leg swelling.   Gastrointestinal: Negative for abdominal pain and vomiting.   Musculoskeletal: Positive for neck pain.   Skin: Negative for rash.   Neurological: Positive for headaches.           Past Medical History:   Diagnosis Date    Chronic diastolic congestive heart failure 2020    Encounter for blood transfusion     History of repair of aneurysm of abdominal aorta using endovascular stent graft     DR Bonds     of psychiatric care     Hypertension     Major depressive disorder, single episode, moderate with anxious distress 2020    Malignant neoplasm of upper-outer quadrant of right breast in female, estrogen receptor positive 3/14/2019    radiation    Psychiatric problem     Sleep apnea     Sleep difficulties     Stroke     no residual defect    Therapy      Past Surgical History:   Procedure Laterality Date    APPENDECTOMY      AXILLARY NODE DISSECTION Right 2020    Procedure: LYMPHADENECTOMY, AXILLARY;  Surgeon: Artemio Starks MD;  Location: [facility] OR;  Service: General;  Laterality: Right;    BIOPSY OF AXILLARY NODE Right 2021    Procedure: BIOPSY, LYMPH NODE, AXILLARY;  Surgeon: Artemio Sutton MD;  Location: [address];  Service: General;  Laterality: Right;    BREAST BIOPSY      2019    BREAST LUMPECTOMY      2019     SECTION      x 1    OOPHORECTOMY      right     SENTINEL LYMPH NODE BIOPSY Right 2019    Procedure: BIOPSY, LYMPH NODE, SENTINEL;  Surgeon: Artemio Starks MD;  Location: [facility];  Service: General;  Laterality: Right;    splenic artery aneurysm repair      TONSILLECTOMY       Family History   Problem Relation Age of Onset    Diabetes Maternal Grandmother     Diabetes Maternal Grandfather      Diabetes Mother     Hypertension Mother     Sickle cell anemia Daughter     Breast cancer Neg Hx     Colon cancer Neg Hx     Ovarian cancer Neg Hx      Social History     Socioeconomic History    Marital status:      Spouse name: Campos Luther    Number of children: 2   Tobacco Use    Smoking status: Never Smoker    Smokeless tobacco: Never Used   Substance and Sexual Activity    Alcohol use: No    Drug use: No    Sexual activity: Yes     Partners: Male     Birth control/protection: Post-menopausal     Social Determinants of Health     Financial Resource Strain: Medium Risk    Difficulty of Paying Living Expenses: Somewhat hard   Food Insecurity: No Food Insecurity    Worried About Running Out of Food in the Last Year: Never true    Ran Out of Food in the Last Year: Never true   Transportation Needs: No Transportation Needs    Lack of Transportation (Medical): No    Lack of Transportation (Non-Medical): No   Physical Activity: Inactive    Days of Exercise per Week: 0 days    Minutes of Exercise per Session: 0 min   Stress: Stress Concern Present    Feeling of Stress : Very much   Social Connections: Socially Integrated    Frequency of Communication with Friends and Family: More than three times a week    Frequency of Social Gatherings with Friends and Family: Never    Attends Yazdanism Services: More than 4 times per year    Active Member of Clubs or Organizations: Yes    Attends Club or Organization Meetings: 1 to 4 times per year    Marital Status:    Housing Stability: Low Risk     Unable to Pay for Housing in the Last Year: No    Number of Places Lived in the Last Year: 1    Unstable Housing in the Last Year: No       Objective:        Physical Exam  Vitals reviewed.   Constitutional:       Appearance: She is ill-appearing.   Pulmonary:      Effort: Pulmonary effort is normal.   Neurological:      Mental Status: She is alert and oriented to person, place, and time.            Results for orders placed or performed during the hospital encounter of 06/13/22   CBC auto differential   Result Value Ref Range    WBC 9.21 3.90 - 12.70 K/uL    RBC 4.62 4.00 - 5.40 M/uL    Hemoglobin 11.2 (L) 12.0 - 16.0 g/dL    Hematocrit 36.7 (L) 37.0 - 48.5 %    MCV 79 (L) 82 - 98 fL    MCH 24.2 (L) 27.0 - 31.0 pg    MCHC 30.5 (L) 32.0 - 36.0 g/dL    RDW 16.1 (H) 11.5 - 14.5 %    Platelets 285 150 - 450 K/uL    MPV 9.8 9.2 - 12.9 fL    Immature Granulocytes 0.2 0.0 - 0.5 %    Gran # (ANC) 5.7 1.8 - 7.7 K/uL    Immature Grans (Abs) 0.02 0.00 - 0.04 K/uL    Lymph # 2.7 1.0 - 4.8 K/uL    Mono # 0.7 0.3 - 1.0 K/uL    Eos # 0.1 0.0 - 0.5 K/uL    Baso # 0.07 0.00 - 0.20 K/uL    nRBC 0 0 /100 WBC    Gran % 61.4 38.0 - 73.0 %    Lymph % 29.0 18.0 - 48.0 %    Mono % 7.3 4.0 - 15.0 %    Eosinophil % 1.3 0.0 - 8.0 %    Basophil % 0.8 0.0 - 1.9 %    Differential Method Automated    Comprehensive metabolic panel   Result Value Ref Range    Sodium 140 136 - 145 mmol/L    Potassium 4.4 3.5 - 5.1 mmol/L    Chloride 105 95 - 110 mmol/L    CO2 23 23 - 29 mmol/L    Glucose 134 (H) 70 - 110 mg/dL    BUN 32 (H) 8 - 23 mg/dL    Creatinine 2.1 (H) 0.5 - 1.4 mg/dL    Calcium 9.3 8.7 - 10.5 mg/dL    Total Protein 7.8 6.0 - 8.4 g/dL    Albumin 3.3 (L) 3.5 - 5.2 g/dL    Total Bilirubin 0.3 0.1 - 1.0 mg/dL    Alkaline Phosphatase 114 55 - 135 U/L    AST 13 10 - 40 U/L    ALT 9 (L) 10 - 44 U/L    Anion Gap 12 8 - 16 mmol/L    eGFR if African American 27 (A) >60 mL/min/1.73 m^2    eGFR if non African American 23 (A) >60 mL/min/1.73 m^2   Troponin I #1   Result Value Ref Range    Troponin I 0.021 0.000 - 0.026 ng/mL   BNP   Result Value Ref Range    BNP 13 0 - 99 pg/mL   CK   Result Value Ref Range    CPK 34 20 - 180 U/L   Troponin I   Result Value Ref Range    Troponin I 0.017 0.000 - 0.026 ng/mL   COVID-19 Rapid Screening   Result Value Ref Range    SARS-CoV-2 RNA, Amplification, Qual Negative Negative   Renal Function Panel    Result Value Ref Range    Glucose 114 (H) 70 - 110 mg/dL    Sodium 141 136 - 145 mmol/L    Potassium 3.9 3.5 - 5.1 mmol/L    Chloride 109 95 - 110 mmol/L    CO2 23 23 - 29 mmol/L    BUN 26 (H) 8 - 23 mg/dL    Calcium 8.8 8.7 - 10.5 mg/dL    Creatinine 1.4 0.5 - 1.4 mg/dL    Albumin 2.8 (L) 3.5 - 5.2 g/dL    Phosphorus 3.6 2.7 - 4.5 mg/dL    eGFR if African American 44 (A) >60 mL/min/1.73 m^2    eGFR if non African American 38 (A) >60 mL/min/1.73 m^2    Anion Gap 9 8 - 16 mmol/L   CBC auto differential   Result Value Ref Range    WBC 8.84 3.90 - 12.70 K/uL    RBC 4.26 4.00 - 5.40 M/uL    Hemoglobin 10.3 (L) 12.0 - 16.0 g/dL    Hematocrit 33.8 (L) 37.0 - 48.5 %    MCV 79 (L) 82 - 98 fL    MCH 24.2 (L) 27.0 - 31.0 pg    MCHC 30.5 (L) 32.0 - 36.0 g/dL    RDW 16.2 (H) 11.5 - 14.5 %    Platelets 235 150 - 450 K/uL    MPV 9.5 9.2 - 12.9 fL    Immature Granulocytes 0.2 0.0 - 0.5 %    Gran # (ANC) 5.3 1.8 - 7.7 K/uL    Immature Grans (Abs) 0.02 0.00 - 0.04 K/uL    Lymph # 2.6 1.0 - 4.8 K/uL    Mono # 0.7 0.3 - 1.0 K/uL    Eos # 0.2 0.0 - 0.5 K/uL    Baso # 0.06 0.00 - 0.20 K/uL    nRBC 0 0 /100 WBC    Gran % 59.5 38.0 - 73.0 %    Lymph % 29.2 18.0 - 48.0 %    Mono % 8.4 4.0 - 15.0 %    Eosinophil % 2.0 0.0 - 8.0 %    Basophil % 0.7 0.0 - 1.9 %    Differential Method Automated    Troponin I   Result Value Ref Range    Troponin I 0.013 0.000 - 0.026 ng/mL   Troponin I   Result Value Ref Range    Troponin I 0.019 0.000 - 0.026 ng/mL   POCT glucose   Result Value Ref Range    POCT Glucose 105 70 - 110 mg/dL     *Note: Due to a large number of results and/or encounters for the requested time period, some results have not been displayed. A complete set of results can be found in Results Review.       Assessment/Plan:     Hospital discharge follow-up    Bilateral leg pain  -     oxyCODONE-acetaminophen (PERCOCET) 7.5-325 mg per tablet; Take 1 tablet by mouth every 8 (eight) hours as needed for Pain.  Dispense: 60 tablet; Refill:  0    Right arm pain  -     oxyCODONE-acetaminophen (PERCOCET) 7.5-325 mg per tablet; Take 1 tablet by mouth every 8 (eight) hours as needed for Pain.  Dispense: 60 tablet; Refill: 0    Malignant neoplasm of upper-outer quadrant of right breast in female, estrogen receptor positive  -     ALPRAZolam (XANAX) 0.5 MG tablet; Take 1 tablet (0.5 mg total) by mouth 2 (two) times daily as needed for Insomnia or Anxiety.  Dispense: 60 tablet; Refill: 0    Anxiety  -     ALPRAZolam (XANAX) 0.5 MG tablet; Take 1 tablet (0.5 mg total) by mouth 2 (two) times daily as needed for Insomnia or Anxiety.  Dispense: 60 tablet; Refill: 0        Reviewed discharge summary  Continue to hold blood pressure medication   Home health currently for OT/PT/ST  Take time in doing things around the house     Follow up next week     Peace Arias MD  [facility]   Family Medicine

## 2022-07-05 NOTE — ASSESSMENT & PLAN NOTE
Likely provoked due to hypercoagulable state from breast cancer and obesity. Continue apixaban at 5 mg b.i.d..  Advised that she will be on long-term anticoagulation given recurrent thrombosis history as well as active malignancy. Reviewed labs from 02/28/2022 and noted no concerning cytopenia.

## 2022-07-05 NOTE — ASSESSMENT & PLAN NOTE
72-year-old female with stage IIA (pT2, pN0(sn), cM0, G2, ER+, MI-, HER2-) invasive lobular carcinoma of right breast, status post lumpectomy with sentinel lymph node biopsy in 2019.  Currently on Arimidex and tolerating well.  DEXA scan ordered.  Yet to be obtained.     Unfortunately, imaging studies have shown persistently enlarged right subpectoral lymph node as well as FDG avid left posterior cervical and supraclavicular lymph nodes.  Multiple biopsies in the past have been nondiagnostic for malignancy.  Case was presented at multi-disciplinary tumor conference with recommendation to continue Arimidex.  Patient was not satisfied with recommendation a requested for 2nd opinion.     She was evaluated by Dr. Sutton in [place] who recommended MRI of breast.     MRI was performed on 02/23/2021 and showed lymph node mass deep to the right pectoralis minor muscle measuring 6.6 x 4.7 x 2.9 cm. There were also adjacent satellite lymph nodes anteriorly to the dominant lymph node measured 1.1 x 0.8 cm and 0.6 x 0.4 cm.  Also described was a mass effect upon the right subclavian vein.  The mass does not in case or envelope the subclavian neurovascular structures.     Given the above findings, recommendation was made to excise the mass if not for diagnostic purposes for pain management.  She underwent lymph node excision on 03/01/2021, pathology returned back as lymph node with follicular hyperplasia. No evidence of metastatic carcinoma or lymphoma.     She was continued on Arimidex with plan for short interval PET scan.     Patient had a PET scan on 04/14/2021 that showed interim right axillary lymph node excision with large residual hypermetabolic right subpectoral lymph node mass.  Also noted interval development of subcentimeter mildly FDG avid left posterior cervical and supraclavicular lymph nodes. No FDG avid mediastinal or hilar nodes.No FDG avid pulmonary nodules/masses.      Had diagnostic bilateral  mammography on 09/24/2021, results showed a left breast subcentimeter mass at the 9 o'clock position which is new when compared to mammography from 2019.  Will plan a short-interval mammogram in 6 months to re-assess left breast mass.There was no mammographic evidence of disease in the right breast.     Saw Dr. Sutton in [place] who recommended a repeat PET-CT scan. Results from PET-CT scan showed interval enlargement of subpectoral mass on the right with sustained highly FDG avidity.     Me was she continues to complain of shoulder pain and breast discomfort.  Most recently CT neck chest with contrast was obtained on 03/17/2022, when compared to PET scan from October of 2021, the right subpectoral lymph node has not changed and still measures about 6 cm maximally.     Given continuing discomfort and pain associated with these enlarged lymph nodes.  She was referred to surgical oncologist following extensive conversation, it appears the risk of repeat surgical resection outweighs the benefits.  Decision was made to optimize neuropathic pain management.     Lyrica was initiated for neuropathic pain control. Meanwhile I recommend continued Arimidex and pain management.

## 2022-07-06 ENCOUNTER — LAB VISIT (OUTPATIENT)
Dept: LAB | Facility: HOSPITAL | Age: 72
End: 2022-07-06
Attending: INTERNAL MEDICINE
Payer: MEDICARE

## 2022-07-06 ENCOUNTER — OFFICE VISIT (OUTPATIENT)
Dept: HEMATOLOGY/ONCOLOGY | Facility: CLINIC | Age: 72
End: 2022-07-06
Payer: MEDICARE

## 2022-07-06 ENCOUNTER — OFFICE VISIT (OUTPATIENT)
Dept: CARDIOLOGY | Facility: CLINIC | Age: 72
End: 2022-07-06
Payer: MEDICARE

## 2022-07-06 ENCOUNTER — OFFICE VISIT (OUTPATIENT)
Dept: PALLIATIVE MEDICINE | Facility: CLINIC | Age: 72
End: 2022-07-06
Payer: MEDICARE

## 2022-07-06 VITALS
HEART RATE: 74 BPM | WEIGHT: 293 LBS | RESPIRATION RATE: 18 BRPM | DIASTOLIC BLOOD PRESSURE: 82 MMHG | HEIGHT: 69 IN | SYSTOLIC BLOOD PRESSURE: 149 MMHG | TEMPERATURE: 97 F | BODY MASS INDEX: 43.4 KG/M2

## 2022-07-06 VITALS
HEART RATE: 74 BPM | DIASTOLIC BLOOD PRESSURE: 82 MMHG | HEIGHT: 70 IN | WEIGHT: 293 LBS | SYSTOLIC BLOOD PRESSURE: 149 MMHG | OXYGEN SATURATION: 99 % | BODY MASS INDEX: 41.95 KG/M2 | TEMPERATURE: 99 F

## 2022-07-06 VITALS
HEIGHT: 69 IN | OXYGEN SATURATION: 99 % | BODY MASS INDEX: 43.4 KG/M2 | WEIGHT: 293 LBS | SYSTOLIC BLOOD PRESSURE: 140 MMHG | HEART RATE: 77 BPM | DIASTOLIC BLOOD PRESSURE: 90 MMHG

## 2022-07-06 DIAGNOSIS — I26.99 RECURRENT PULMONARY EMBOLI: ICD-10-CM

## 2022-07-06 DIAGNOSIS — Z86.711 HISTORY OF PULMONARY EMBOLISM: ICD-10-CM

## 2022-07-06 DIAGNOSIS — E66.01 MORBID OBESITY WITH BMI OF 45.0-49.9, ADULT: ICD-10-CM

## 2022-07-06 DIAGNOSIS — F54 PSYCHOLOGICAL FACTORS AFFECTING MEDICAL CONDITION: ICD-10-CM

## 2022-07-06 DIAGNOSIS — M79.2 NEUROPATHIC PAIN: ICD-10-CM

## 2022-07-06 DIAGNOSIS — C50.411 MALIGNANT NEOPLASM OF UPPER-OUTER QUADRANT OF RIGHT BREAST IN FEMALE, ESTROGEN RECEPTOR POSITIVE: Chronic | ICD-10-CM

## 2022-07-06 DIAGNOSIS — M79.604 BILATERAL LEG PAIN: ICD-10-CM

## 2022-07-06 DIAGNOSIS — Z86.73 HISTORY OF CVA (CEREBROVASCULAR ACCIDENT): ICD-10-CM

## 2022-07-06 DIAGNOSIS — R07.9 CHEST PAIN, UNSPECIFIED TYPE: Primary | ICD-10-CM

## 2022-07-06 DIAGNOSIS — Z17.0 MALIGNANT NEOPLASM OF UPPER-OUTER QUADRANT OF RIGHT BREAST IN FEMALE, ESTROGEN RECEPTOR POSITIVE: Chronic | ICD-10-CM

## 2022-07-06 DIAGNOSIS — I10 HYPERTENSION, UNSPECIFIED TYPE: ICD-10-CM

## 2022-07-06 DIAGNOSIS — M79.601 RIGHT ARM PAIN: ICD-10-CM

## 2022-07-06 DIAGNOSIS — M79.605 BILATERAL LEG PAIN: ICD-10-CM

## 2022-07-06 DIAGNOSIS — F32.1 MAJOR DEPRESSIVE DISORDER, SINGLE EPISODE, MODERATE WITH ANXIOUS DISTRESS: ICD-10-CM

## 2022-07-06 DIAGNOSIS — I50.32 CHRONIC DIASTOLIC CONGESTIVE HEART FAILURE: ICD-10-CM

## 2022-07-06 DIAGNOSIS — E78.5 HYPERLIPIDEMIA, UNSPECIFIED HYPERLIPIDEMIA TYPE: ICD-10-CM

## 2022-07-06 DIAGNOSIS — I71.40 ABDOMINAL AORTIC ANEURYSM (AAA) WITHOUT RUPTURE: ICD-10-CM

## 2022-07-06 DIAGNOSIS — C50.411 MALIGNANT NEOPLASM OF UPPER-OUTER QUADRANT OF RIGHT BREAST IN FEMALE, ESTROGEN RECEPTOR POSITIVE: ICD-10-CM

## 2022-07-06 DIAGNOSIS — Z17.0 MALIGNANT NEOPLASM OF UPPER-OUTER QUADRANT OF RIGHT BREAST IN FEMALE, ESTROGEN RECEPTOR POSITIVE: ICD-10-CM

## 2022-07-06 DIAGNOSIS — M79.604 RIGHT LEG PAIN: ICD-10-CM

## 2022-07-06 DIAGNOSIS — G89.3 NEOPLASM RELATED PAIN: Primary | ICD-10-CM

## 2022-07-06 LAB
ALBUMIN SERPL BCP-MCNC: 3 G/DL (ref 3.5–5.2)
ALP SERPL-CCNC: 126 U/L (ref 55–135)
ALT SERPL W/O P-5'-P-CCNC: 15 U/L (ref 10–44)
ANION GAP SERPL CALC-SCNC: 10 MMOL/L (ref 8–16)
AST SERPL-CCNC: 14 U/L (ref 10–40)
BASOPHILS # BLD AUTO: 0.07 K/UL (ref 0–0.2)
BASOPHILS NFR BLD: 0.7 % (ref 0–1.9)
BILIRUB SERPL-MCNC: 0.3 MG/DL (ref 0.1–1)
BUN SERPL-MCNC: 28 MG/DL (ref 8–23)
CALCIUM SERPL-MCNC: 9.5 MG/DL (ref 8.7–10.5)
CHLORIDE SERPL-SCNC: 110 MMOL/L (ref 95–110)
CO2 SERPL-SCNC: 24 MMOL/L (ref 23–29)
CREAT SERPL-MCNC: 1.6 MG/DL (ref 0.5–1.4)
DIFFERENTIAL METHOD: ABNORMAL
EOSINOPHIL # BLD AUTO: 0.2 K/UL (ref 0–0.5)
EOSINOPHIL NFR BLD: 1.9 % (ref 0–8)
ERYTHROCYTE [DISTWIDTH] IN BLOOD BY AUTOMATED COUNT: 16.6 % (ref 11.5–14.5)
EST. GFR  (AFRICAN AMERICAN): 37 ML/MIN/1.73 M^2
EST. GFR  (NON AFRICAN AMERICAN): 32 ML/MIN/1.73 M^2
GLUCOSE SERPL-MCNC: 101 MG/DL (ref 70–110)
HCT VFR BLD AUTO: 34.6 % (ref 37–48.5)
HGB BLD-MCNC: 10.5 G/DL (ref 12–16)
IMM GRANULOCYTES # BLD AUTO: 0.04 K/UL (ref 0–0.04)
IMM GRANULOCYTES NFR BLD AUTO: 0.4 % (ref 0–0.5)
LYMPHOCYTES # BLD AUTO: 3.1 K/UL (ref 1–4.8)
LYMPHOCYTES NFR BLD: 32.7 % (ref 18–48)
MCH RBC QN AUTO: 24.2 PG (ref 27–31)
MCHC RBC AUTO-ENTMCNC: 30.3 G/DL (ref 32–36)
MCV RBC AUTO: 80 FL (ref 82–98)
MONOCYTES # BLD AUTO: 0.8 K/UL (ref 0.3–1)
MONOCYTES NFR BLD: 8.7 % (ref 4–15)
NEUTROPHILS # BLD AUTO: 5.3 K/UL (ref 1.8–7.7)
NEUTROPHILS NFR BLD: 55.6 % (ref 38–73)
NRBC BLD-RTO: 0 /100 WBC
PLATELET # BLD AUTO: 286 K/UL (ref 150–450)
PMV BLD AUTO: 9.2 FL (ref 9.2–12.9)
POTASSIUM SERPL-SCNC: 4.4 MMOL/L (ref 3.5–5.1)
PROT SERPL-MCNC: 7.3 G/DL (ref 6–8.4)
RBC # BLD AUTO: 4.34 M/UL (ref 4–5.4)
SODIUM SERPL-SCNC: 144 MMOL/L (ref 136–145)
WBC # BLD AUTO: 9.59 K/UL (ref 3.9–12.7)

## 2022-07-06 PROCEDURE — 3077F SYST BP >= 140 MM HG: CPT | Mod: CPTII,S$GLB,, | Performed by: PHYSICIAN ASSISTANT

## 2022-07-06 PROCEDURE — 3288F PR FALLS RISK ASSESSMENT DOCUMENTED: ICD-10-PCS | Mod: CPTII,S$GLB,, | Performed by: INTERNAL MEDICINE

## 2022-07-06 PROCEDURE — 1159F PR MEDICATION LIST DOCUMENTED IN MEDICAL RECORD: ICD-10-PCS | Mod: CPTII,S$GLB,, | Performed by: PHYSICIAN ASSISTANT

## 2022-07-06 PROCEDURE — 36415 COLL VENOUS BLD VENIPUNCTURE: CPT | Performed by: INTERNAL MEDICINE

## 2022-07-06 PROCEDURE — 1123F ACP DISCUSS/DSCN MKR DOCD: CPT | Mod: CPTII,S$GLB,, | Performed by: PHYSICIAN ASSISTANT

## 2022-07-06 PROCEDURE — 3077F SYST BP >= 140 MM HG: CPT | Mod: CPTII,S$GLB,, | Performed by: STUDENT IN AN ORGANIZED HEALTH CARE EDUCATION/TRAINING PROGRAM

## 2022-07-06 PROCEDURE — 1160F RVW MEDS BY RX/DR IN RCRD: CPT | Mod: CPTII,S$GLB,, | Performed by: PHYSICIAN ASSISTANT

## 2022-07-06 PROCEDURE — 3079F PR MOST RECENT DIASTOLIC BLOOD PRESSURE 80-89 MM HG: ICD-10-PCS | Mod: CPTII,S$GLB,, | Performed by: PHYSICIAN ASSISTANT

## 2022-07-06 PROCEDURE — 3080F DIAST BP >= 90 MM HG: CPT | Mod: CPTII,S$GLB,, | Performed by: STUDENT IN AN ORGANIZED HEALTH CARE EDUCATION/TRAINING PROGRAM

## 2022-07-06 PROCEDURE — 4010F ACE/ARB THERAPY RXD/TAKEN: CPT | Mod: CPTII,S$GLB,, | Performed by: STUDENT IN AN ORGANIZED HEALTH CARE EDUCATION/TRAINING PROGRAM

## 2022-07-06 PROCEDURE — 3077F PR MOST RECENT SYSTOLIC BLOOD PRESSURE >= 140 MM HG: ICD-10-PCS | Mod: CPTII,S$GLB,, | Performed by: INTERNAL MEDICINE

## 2022-07-06 PROCEDURE — 99204 OFFICE O/P NEW MOD 45 MIN: CPT | Mod: S$GLB,,, | Performed by: PHYSICIAN ASSISTANT

## 2022-07-06 PROCEDURE — 1125F AMNT PAIN NOTED PAIN PRSNT: CPT | Mod: CPTII,S$GLB,, | Performed by: INTERNAL MEDICINE

## 2022-07-06 PROCEDURE — 1123F ACP DISCUSS/DSCN MKR DOCD: CPT | Mod: CPTII,S$GLB,, | Performed by: STUDENT IN AN ORGANIZED HEALTH CARE EDUCATION/TRAINING PROGRAM

## 2022-07-06 PROCEDURE — 1125F AMNT PAIN NOTED PAIN PRSNT: CPT | Mod: CPTII,S$GLB,, | Performed by: STUDENT IN AN ORGANIZED HEALTH CARE EDUCATION/TRAINING PROGRAM

## 2022-07-06 PROCEDURE — 3077F SYST BP >= 140 MM HG: CPT | Mod: CPTII,S$GLB,, | Performed by: INTERNAL MEDICINE

## 2022-07-06 PROCEDURE — 99214 OFFICE O/P EST MOD 30 MIN: CPT | Mod: S$GLB,,, | Performed by: STUDENT IN AN ORGANIZED HEALTH CARE EDUCATION/TRAINING PROGRAM

## 2022-07-06 PROCEDURE — 1100F PTFALLS ASSESS-DOCD GE2>/YR: CPT | Mod: CPTII,S$GLB,, | Performed by: PHYSICIAN ASSISTANT

## 2022-07-06 PROCEDURE — 3080F PR MOST RECENT DIASTOLIC BLOOD PRESSURE >= 90 MM HG: ICD-10-PCS | Mod: CPTII,S$GLB,, | Performed by: STUDENT IN AN ORGANIZED HEALTH CARE EDUCATION/TRAINING PROGRAM

## 2022-07-06 PROCEDURE — 99999 PR PBB SHADOW E&M-EST. PATIENT-LVL IV: ICD-10-PCS | Mod: PBBFAC,,, | Performed by: INTERNAL MEDICINE

## 2022-07-06 PROCEDURE — 3008F PR BODY MASS INDEX (BMI) DOCUMENTED: ICD-10-PCS | Mod: CPTII,S$GLB,, | Performed by: STUDENT IN AN ORGANIZED HEALTH CARE EDUCATION/TRAINING PROGRAM

## 2022-07-06 PROCEDURE — 1159F MED LIST DOCD IN RCRD: CPT | Mod: CPTII,S$GLB,, | Performed by: INTERNAL MEDICINE

## 2022-07-06 PROCEDURE — 4010F PR ACE/ARB THEARPY RXD/TAKEN: ICD-10-PCS | Mod: CPTII,S$GLB,, | Performed by: PHYSICIAN ASSISTANT

## 2022-07-06 PROCEDURE — 3079F DIAST BP 80-89 MM HG: CPT | Mod: CPTII,S$GLB,, | Performed by: PHYSICIAN ASSISTANT

## 2022-07-06 PROCEDURE — 3288F FALL RISK ASSESSMENT DOCD: CPT | Mod: CPTII,S$GLB,, | Performed by: INTERNAL MEDICINE

## 2022-07-06 PROCEDURE — 3008F PR BODY MASS INDEX (BMI) DOCUMENTED: ICD-10-PCS | Mod: CPTII,S$GLB,, | Performed by: INTERNAL MEDICINE

## 2022-07-06 PROCEDURE — 4010F ACE/ARB THERAPY RXD/TAKEN: CPT | Mod: CPTII,S$GLB,, | Performed by: PHYSICIAN ASSISTANT

## 2022-07-06 PROCEDURE — 1123F PR ADV CARE PLAN DISCUSSED, PLAN OR SURROGATE DOCUMENTED: ICD-10-PCS | Mod: CPTII,S$GLB,, | Performed by: PHYSICIAN ASSISTANT

## 2022-07-06 PROCEDURE — 99999 PR PBB SHADOW E&M-EST. PATIENT-LVL V: CPT | Mod: PBBFAC,,, | Performed by: PHYSICIAN ASSISTANT

## 2022-07-06 PROCEDURE — 1100F PR PT FALLS ASSESS DOC 2+ FALLS/FALL W/INJURY/YR: ICD-10-PCS | Mod: CPTII,S$GLB,, | Performed by: PHYSICIAN ASSISTANT

## 2022-07-06 PROCEDURE — 99497 PR ADVNCD CARE PLAN 30 MIN: ICD-10-PCS | Mod: S$GLB,,, | Performed by: PHYSICIAN ASSISTANT

## 2022-07-06 PROCEDURE — 3008F PR BODY MASS INDEX (BMI) DOCUMENTED: ICD-10-PCS | Mod: CPTII,S$GLB,, | Performed by: PHYSICIAN ASSISTANT

## 2022-07-06 PROCEDURE — 85025 COMPLETE CBC W/AUTO DIFF WBC: CPT | Performed by: INTERNAL MEDICINE

## 2022-07-06 PROCEDURE — 3288F PR FALLS RISK ASSESSMENT DOCUMENTED: ICD-10-PCS | Mod: CPTII,S$GLB,, | Performed by: PHYSICIAN ASSISTANT

## 2022-07-06 PROCEDURE — 99999 PR PBB SHADOW E&M-EST. PATIENT-LVL IV: CPT | Mod: PBBFAC,,, | Performed by: STUDENT IN AN ORGANIZED HEALTH CARE EDUCATION/TRAINING PROGRAM

## 2022-07-06 PROCEDURE — 4010F PR ACE/ARB THEARPY RXD/TAKEN: ICD-10-PCS | Mod: CPTII,S$GLB,, | Performed by: STUDENT IN AN ORGANIZED HEALTH CARE EDUCATION/TRAINING PROGRAM

## 2022-07-06 PROCEDURE — 99214 PR OFFICE/OUTPT VISIT, EST, LEVL IV, 30-39 MIN: ICD-10-PCS | Mod: S$GLB,,, | Performed by: STUDENT IN AN ORGANIZED HEALTH CARE EDUCATION/TRAINING PROGRAM

## 2022-07-06 PROCEDURE — 1159F MED LIST DOCD IN RCRD: CPT | Mod: CPTII,S$GLB,, | Performed by: STUDENT IN AN ORGANIZED HEALTH CARE EDUCATION/TRAINING PROGRAM

## 2022-07-06 PROCEDURE — 3079F PR MOST RECENT DIASTOLIC BLOOD PRESSURE 80-89 MM HG: ICD-10-PCS | Mod: CPTII,S$GLB,, | Performed by: INTERNAL MEDICINE

## 2022-07-06 PROCEDURE — 3077F PR MOST RECENT SYSTOLIC BLOOD PRESSURE >= 140 MM HG: ICD-10-PCS | Mod: CPTII,S$GLB,, | Performed by: STUDENT IN AN ORGANIZED HEALTH CARE EDUCATION/TRAINING PROGRAM

## 2022-07-06 PROCEDURE — 3288F FALL RISK ASSESSMENT DOCD: CPT | Mod: CPTII,S$GLB,, | Performed by: PHYSICIAN ASSISTANT

## 2022-07-06 PROCEDURE — 3077F PR MOST RECENT SYSTOLIC BLOOD PRESSURE >= 140 MM HG: ICD-10-PCS | Mod: CPTII,S$GLB,, | Performed by: PHYSICIAN ASSISTANT

## 2022-07-06 PROCEDURE — 1125F PR PAIN SEVERITY QUANTIFIED, PAIN PRESENT: ICD-10-PCS | Mod: CPTII,S$GLB,, | Performed by: PHYSICIAN ASSISTANT

## 2022-07-06 PROCEDURE — 1101F PR PT FALLS ASSESS DOC 0-1 FALLS W/OUT INJ PAST YR: ICD-10-PCS | Mod: CPTII,S$GLB,, | Performed by: INTERNAL MEDICINE

## 2022-07-06 PROCEDURE — 99999 PR PBB SHADOW E&M-EST. PATIENT-LVL IV: CPT | Mod: PBBFAC,,, | Performed by: INTERNAL MEDICINE

## 2022-07-06 PROCEDURE — 3079F DIAST BP 80-89 MM HG: CPT | Mod: CPTII,S$GLB,, | Performed by: INTERNAL MEDICINE

## 2022-07-06 PROCEDURE — 99999 PR PBB SHADOW E&M-EST. PATIENT-LVL V: ICD-10-PCS | Mod: PBBFAC,,, | Performed by: PHYSICIAN ASSISTANT

## 2022-07-06 PROCEDURE — 3008F BODY MASS INDEX DOCD: CPT | Mod: CPTII,S$GLB,, | Performed by: PHYSICIAN ASSISTANT

## 2022-07-06 PROCEDURE — 1159F PR MEDICATION LIST DOCUMENTED IN MEDICAL RECORD: ICD-10-PCS | Mod: CPTII,S$GLB,, | Performed by: STUDENT IN AN ORGANIZED HEALTH CARE EDUCATION/TRAINING PROGRAM

## 2022-07-06 PROCEDURE — 80053 COMPREHEN METABOLIC PANEL: CPT | Performed by: INTERNAL MEDICINE

## 2022-07-06 PROCEDURE — 3008F BODY MASS INDEX DOCD: CPT | Mod: CPTII,S$GLB,, | Performed by: INTERNAL MEDICINE

## 2022-07-06 PROCEDURE — 1101F PT FALLS ASSESS-DOCD LE1/YR: CPT | Mod: CPTII,S$GLB,, | Performed by: INTERNAL MEDICINE

## 2022-07-06 PROCEDURE — 1159F MED LIST DOCD IN RCRD: CPT | Mod: CPTII,S$GLB,, | Performed by: PHYSICIAN ASSISTANT

## 2022-07-06 PROCEDURE — 1125F PR PAIN SEVERITY QUANTIFIED, PAIN PRESENT: ICD-10-PCS | Mod: CPTII,S$GLB,, | Performed by: STUDENT IN AN ORGANIZED HEALTH CARE EDUCATION/TRAINING PROGRAM

## 2022-07-06 PROCEDURE — 3008F BODY MASS INDEX DOCD: CPT | Mod: CPTII,S$GLB,, | Performed by: STUDENT IN AN ORGANIZED HEALTH CARE EDUCATION/TRAINING PROGRAM

## 2022-07-06 PROCEDURE — 99999 PR PBB SHADOW E&M-EST. PATIENT-LVL IV: ICD-10-PCS | Mod: PBBFAC,,, | Performed by: STUDENT IN AN ORGANIZED HEALTH CARE EDUCATION/TRAINING PROGRAM

## 2022-07-06 PROCEDURE — 1159F PR MEDICATION LIST DOCUMENTED IN MEDICAL RECORD: ICD-10-PCS | Mod: CPTII,S$GLB,, | Performed by: INTERNAL MEDICINE

## 2022-07-06 PROCEDURE — 1125F AMNT PAIN NOTED PAIN PRSNT: CPT | Mod: CPTII,S$GLB,, | Performed by: PHYSICIAN ASSISTANT

## 2022-07-06 PROCEDURE — 99215 PR OFFICE/OUTPT VISIT, EST, LEVL V, 40-54 MIN: ICD-10-PCS | Mod: S$GLB,,, | Performed by: INTERNAL MEDICINE

## 2022-07-06 PROCEDURE — 1160F PR REVIEW ALL MEDS BY PRESCRIBER/CLIN PHARMACIST DOCUMENTED: ICD-10-PCS | Mod: CPTII,S$GLB,, | Performed by: PHYSICIAN ASSISTANT

## 2022-07-06 PROCEDURE — 1125F PR PAIN SEVERITY QUANTIFIED, PAIN PRESENT: ICD-10-PCS | Mod: CPTII,S$GLB,, | Performed by: INTERNAL MEDICINE

## 2022-07-06 PROCEDURE — 99204 PR OFFICE/OUTPT VISIT, NEW, LEVL IV, 45-59 MIN: ICD-10-PCS | Mod: S$GLB,,, | Performed by: PHYSICIAN ASSISTANT

## 2022-07-06 PROCEDURE — 4010F ACE/ARB THERAPY RXD/TAKEN: CPT | Mod: CPTII,S$GLB,, | Performed by: INTERNAL MEDICINE

## 2022-07-06 PROCEDURE — 99497 ADVNCD CARE PLAN 30 MIN: CPT | Mod: S$GLB,,, | Performed by: PHYSICIAN ASSISTANT

## 2022-07-06 PROCEDURE — 1123F PR ADV CARE PLAN DISCUSSED, PLAN OR SURROGATE DOCUMENTED: ICD-10-PCS | Mod: CPTII,S$GLB,, | Performed by: STUDENT IN AN ORGANIZED HEALTH CARE EDUCATION/TRAINING PROGRAM

## 2022-07-06 PROCEDURE — 99215 OFFICE O/P EST HI 40 MIN: CPT | Mod: S$GLB,,, | Performed by: INTERNAL MEDICINE

## 2022-07-06 PROCEDURE — 4010F PR ACE/ARB THEARPY RXD/TAKEN: ICD-10-PCS | Mod: CPTII,S$GLB,, | Performed by: INTERNAL MEDICINE

## 2022-07-06 RX ORDER — CARVEDILOL 12.5 MG/1
12.5 TABLET ORAL DAILY
COMMUNITY
Start: 2022-06-23 | End: 2022-09-07 | Stop reason: SDUPTHER

## 2022-07-06 RX ORDER — CLONAZEPAM 0.5 MG/1
0.5 TABLET, ORALLY DISINTEGRATING ORAL 2 TIMES DAILY PRN
Qty: 60 TABLET | Refills: 0 | Status: SHIPPED | OUTPATIENT
Start: 2022-07-06 | End: 2022-07-13

## 2022-07-06 RX ORDER — DULOXETIN HYDROCHLORIDE 30 MG/1
30 CAPSULE, DELAYED RELEASE ORAL DAILY
Qty: 30 CAPSULE | Refills: 11 | Status: SHIPPED | OUTPATIENT
Start: 2022-07-06 | End: 2022-09-01

## 2022-07-06 RX ORDER — OXYCODONE AND ACETAMINOPHEN 7.5; 325 MG/1; MG/1
1 TABLET ORAL EVERY 6 HOURS PRN
Qty: 60 TABLET | Refills: 0 | Status: SHIPPED | OUTPATIENT
Start: 2022-07-06 | End: 2022-08-02 | Stop reason: SDUPTHER

## 2022-07-06 NOTE — PROGRESS NOTES
Subjective:   Patient ID:  Susu Luther is a 72 y.o. female who presents for follow up of Shortness of Breath and Chest Pain    12/1/20  69 yo female, care establish. Prior cardiologist Dr ackerman   [facility] HTN, CVA (2009), Right breast CA, Lumpectomy 3/2019, h/o PE off OAC 2 yrs ago.  AAA, s/p transcutaneous patch by Dr. Bonds, obesity knee OA imbalanced walker dependent  C/o SOB after walking few steps and dizziness. Had vision issue 1 month ago due to uncontrolled HTN  No chest pain  Sleeps with 2 pillows  Decent appetites  Leg calf pain worse at night  No smoking/drinking  ekg today NSR LVH.    ECH normal EF, grade II DD, LAE and PAP 56 mmHG   Chest CTA negative for PE  BP high    A1c controlled    S/p Open subpectoral lymph node biopsy on the right on 12/02/2020 by Dr. Starks  Still right chest, under arm and shoudler supersensitive pain      Chest Pain   Associated symptoms include shortness of breath. Pertinent negatives include no abdominal pain, back pain, claudication, cough, diaphoresis, dizziness, fever, headaches, irregular heartbeat, malaise/fatigue, nausea, near-syncope, numbness, orthopnea, palpitations, PND, sputum production, syncope, vomiting or weakness.   Her past medical history is significant for CHF.   Pertinent negatives for past medical history include no seizures.   Shortness of Breath  Associated symptoms include chest pain. Pertinent negatives include no abdominal pain, claudication, fever, headaches, leg swelling, neck pain, orthopnea, PND, rash, sputum production, syncope, vomiting or wheezing.   Congestive Heart Failure  Associated symptoms include chest pain and shortness of breath. Pertinent negatives include no abdominal pain, claudication, near-syncope or palpitations.     2/28/22  Patient was admitted to [facility] for shortness of breath.  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  Started on heparin drip  in transition to oral anticoagulation, now on Eliquis.  Recurrent PE.  On Femara. Has another lump in breast on right side, recently seen on PET scan.   Due to TANNER, was told to stop hctz, telmisartan.  She has ran out of clonidine. Does not take the ASA, hydralazine, amlodipine.   Blood pressure normotensive.  Reports severe headaches.  Has been out of Fioricet.  Echocardiogram with normal EF  Reports shortness of breath, chest heaviness mostly right-sided.      3/14/22  Still having SOB due to PE.  No bleeding issues while on eliquis.  Chest pain is substernal to right sided.   Lasix doesn't seem to help.   A reports intermittent leg pain right > left.  DVT ultrasound in-hospital with no evidence of DVT.  Following Hematology-Oncology.  Patient does not want surgery if needed for possible breast cancer.  Denies syncope, fever, chills.         4/18/22  Comes in with daughter who lives in [place].  Concerned that she may oxygen issues and may need home oxygen. O2 98%  Reports LE swelling and SOB  Reports chest pain comes on with SOB, did not have chest pain prior to PE  Has not been on lasix, has been held  Reports balancing issues- can do PT once symptoms improve   Denies syncope, fever, chills.       5/16/22  Has been feeling weak last couple days  Feels chest tightness with walking, also CURIEL- feels worse than before. 97% O2 in clinic   Reports dizziness after taking medications   BP low today   Says recurrent cancer may be spreading   No bleeding on eliquis   Reports orthopnea, PND.  Not feeling well- Does not want to the hospital     Denies syncope.      6/13/22  Not feeling well today  Reports chest pain and SOB worsening over the last 2 weeks   Ddimer trending, downTroponin neg and BNP nl on 5/16/22  EKG today without significant abnormalities   Reports recent diagnosis of breast cancer is progressing  Reports right-sided arm weakness  Patient has been increasingly stressed    Denies syncope, lower extremity  swelling.      7/6/2022   went to emergency room 6/13/2022, was worked up for stroke  MRI brain negative  Repeat CTA chest without PE, right-sided mass 6 cm, stable  All blood pressure medications.  Due to hypotension  Started taking Lasix today  Has significant lower extremity pain bilateral, reports blue feet at times            EKG 6/13/22 NSR, LAD, LVH, 93 bpm, qtc 455 ms  EKG 5/16/22 SB, incomplete RBBB, LVH, septal infarct, qtc 422 ms   Echo 2/28/22  · The left ventricle is normal in size with concentric hypertrophy and normal systolic function.  · The estimated ejection fraction is 60%.  · Normal left ventricular diastolic function.  · Normal right ventricular size with normal right ventricular systolic function.  · Mild to moderate tricuspid regurgitation.  · Normal central venous pressure (3 mmHg).  · The estimated PA systolic pressure is 36 mmHg.       Echo 2019  CONCLUSIONS     1 - Mild left atrial enlargement.     2 - Concentric hypertrophy.     3 - No wall motion abnormalities.     4 - Normal left ventricular systolic function (EF 60-65%).     5 - Impaired LV relaxation, elevated LAP (grade 2 diastolic dysfunction).     6 - Normal right ventricular systolic function .     7 - The estimated PA systolic pressure is 36 mmHg.     8 - Mild tricuspid regurgitation.        Past Medical History:   Diagnosis Date    Chronic diastolic congestive heart failure 8/25/2020    Encounter for blood transfusion     History of repair of aneurysm of abdominal aorta using endovascular stent graft     DR Bonds    Hx of psychiatric care     Hypertension     Major depressive disorder, single episode, moderate with anxious distress 1/14/2020    Malignant neoplasm of upper-outer quadrant of right breast in female, estrogen receptor positive 3/14/2019    radiation    Psychiatric problem     Sleep apnea     Sleep difficulties     Stroke 2009    no residual defect    Therapy        Past Surgical History:   Procedure  Laterality Date    APPENDECTOMY      AXILLARY NODE DISSECTION Right 2020    Procedure: LYMPHADENECTOMY, AXILLARY;  Surgeon: Artemio Starks MD;  Location[facility];  Service: General;  Laterality: Right;    BIOPSY OF AXILLARY NODE Right 2021    Procedure: BIOPSY, LYMPH NODE, AXILLARY;  Surgeon: Artemio Sutton MD;  Location: [facility] OR [address] [place] [address];  Service: General;  Laterality: Right;    BREAST BIOPSY          BREAST LUMPECTOMY      2019     SECTION      x 1    OOPHORECTOMY      right     SENTINEL LYMPH NODE BIOPSY Right 2019    Procedure: BIOPSY, LYMPH NODE, SENTINEL;  Surgeon: Artemio Starks MD;  Location: [facility];  Service: General;  Laterality: Right;    splenic artery aneurysm repair      TONSILLECTOMY         Social History     Tobacco Use    Smoking status: Never Smoker    Smokeless tobacco: Never Used   Substance Use Topics    Alcohol use: No    Drug use: No       Family History   Problem Relation Age of Onset    Diabetes Maternal Grandmother     Diabetes Maternal Grandfather     Diabetes Mother     Hypertension Mother     Sickle cell anemia Daughter     Breast cancer Neg Hx     Colon cancer Neg Hx     Ovarian cancer Neg Hx          Review of Systems   Constitutional: Negative for decreased appetite, diaphoresis, fever, malaise/fatigue and night sweats.   HENT: Negative for nosebleeds.    Eyes: Negative for blurred vision and double vision.   Cardiovascular: Positive for chest pain and dyspnea on exertion. Negative for claudication, irregular heartbeat, leg swelling, near-syncope, orthopnea, palpitations, paroxysmal nocturnal dyspnea and syncope.   Respiratory: Positive for shortness of breath. Negative for cough, sleep disturbances due to breathing, snoring, sputum production and wheezing.    Endocrine: Negative for cold intolerance and polyuria.   Hematologic/Lymphatic: Does not bruise/bleed easily.   Skin: Negative for rash.   Musculoskeletal: Negative  "for back pain, falls, joint pain, joint swelling and neck pain.        Right chest breast and shoulder pain   Gastrointestinal: Negative for abdominal pain, heartburn, nausea and vomiting.   Genitourinary: Negative for dysuria, frequency and hematuria.   Neurological: Negative for difficulty with concentration, dizziness, focal weakness, headaches, light-headedness, numbness, seizures and weakness.   Psychiatric/Behavioral: Negative for depression, memory loss and substance abuse. The patient does not have insomnia.    Allergic/Immunologic: Negative for HIV exposure and hives.     Vitals:    07/06/22 1636   BP: (!) 140/90   BP Location: Left arm   Patient Position: Sitting   BP Method: Large (Manual)   Pulse: 77   SpO2: 99%   Weight: (!) 147 kg (324 lb)   Height: 5' 9" (1.753 m)         Objective:   Physical Exam  Constitutional:       Comments: Mild distress.    HENT:      Head: Normocephalic.   Eyes:      Pupils: Pupils are equal, round, and reactive to light.   Neck:      Thyroid: No thyromegaly.      Vascular: Normal carotid pulses. No carotid bruit or JVD.   Cardiovascular:      Rate and Rhythm: Normal rate and regular rhythm.  No extrasystoles are present.     Chest Wall: PMI is not displaced.      Pulses: Normal pulses.      Heart sounds: Normal heart sounds. No murmur heard.    No gallop. No S3 sounds.   Pulmonary:      Effort: No respiratory distress.      Breath sounds: Normal breath sounds. No stridor.   Abdominal:      General: Bowel sounds are normal.      Palpations: Abdomen is soft.      Tenderness: There is no abdominal tenderness. There is no rebound.   Musculoskeletal:         General: Normal range of motion.   Skin:     Findings: No rash.   Neurological:      Mental Status: She is alert and oriented to person, place, and time.   Psychiatric:         Behavior: Behavior normal.         Lab Results   Component Value Date    CHOL 229 (H) 04/18/2022    CHOL 221 (H) 10/19/2020    CHOL 231 (H) 08/25/2020 "     Lab Results   Component Value Date    HDL 45 04/18/2022    HDL 55 10/19/2020    HDL 49 08/25/2020     Lab Results   Component Value Date    LDLCALC 150.4 04/18/2022    LDLCALC 137.4 10/19/2020    LDLCALC 135.6 08/25/2020     Lab Results   Component Value Date    TRIG 168 (H) 04/18/2022    TRIG 143 10/19/2020    TRIG 232 (H) 08/25/2020     Lab Results   Component Value Date    CHOLHDL 19.7 (L) 04/18/2022    CHOLHDL 24.9 10/19/2020    CHOLHDL 21.2 08/25/2020       Chemistry        Component Value Date/Time     07/06/2022 1312    K 4.4 07/06/2022 1312     07/06/2022 1312    CO2 24 07/06/2022 1312    BUN 28 (H) 07/06/2022 1312    CREATININE 1.6 (H) 07/06/2022 1312     07/06/2022 1312        Component Value Date/Time    CALCIUM 9.5 07/06/2022 1312    ALKPHOS 126 07/06/2022 1312    AST 14 07/06/2022 1312    ALT 15 07/06/2022 1312    BILITOT 0.3 07/06/2022 1312    ESTGFRAFRICA 37 (A) 07/06/2022 1312    EGFRNONAA 32 (A) 07/06/2022 1312          Lab Results   Component Value Date    HGBA1C 5.9 (H) 05/18/2019     Lab Results   Component Value Date    TSH 1.460 10/18/2021     Lab Results   Component Value Date    INR 0.9 02/15/2022    INR 1.0 11/10/2020    INR 1.0 05/19/2019     Lab Results   Component Value Date    WBC 9.59 07/06/2022    HGB 10.5 (L) 07/06/2022    HCT 34.6 (L) 07/06/2022    MCV 80 (L) 07/06/2022     07/06/2022     BMP  Sodium   Date Value Ref Range Status   07/06/2022 144 136 - 145 mmol/L Final     Potassium   Date Value Ref Range Status   07/06/2022 4.4 3.5 - 5.1 mmol/L Final     Chloride   Date Value Ref Range Status   07/06/2022 110 95 - 110 mmol/L Final     CO2   Date Value Ref Range Status   07/06/2022 24 23 - 29 mmol/L Final     BUN   Date Value Ref Range Status   07/06/2022 28 (H) 8 - 23 mg/dL Final     Creatinine   Date Value Ref Range Status   07/06/2022 1.6 (H) 0.5 - 1.4 mg/dL Final     Calcium   Date Value Ref Range Status   07/06/2022 9.5 8.7 - 10.5 mg/dL Final      Anion Gap   Date Value Ref Range Status   07/06/2022 10 8 - 16 mmol/L Final     eGFR if    Date Value Ref Range Status   07/06/2022 37 (A) >60 mL/min/1.73 m^2 Final     eGFR if non    Date Value Ref Range Status   07/06/2022 32 (A) >60 mL/min/1.73 m^2 Final     Comment:     Calculation used to obtain the estimated glomerular filtration  rate (eGFR) is the CKD-EPI equation.        BNP  @LABRCNTIP(BNP,BNPTRIAGEBLO)@  @LABRCNTIP(troponini)@  Estimated Creatinine Clearance: 50.9 mL/min (A) (based on SCr of 1.6 mg/dL (H)).  No results found in the last 24 hours.  No results found in the last 24 hours.  No results found in the last 24 hours.    Assessment:      1. Chest pain, unspecified type    2. Hyperlipidemia, unspecified hyperlipidemia type    3. Morbid obesity with BMI of 45.0-49.9, adult    4. History of CVA (cerebrovascular accident)    5. Hypertension, unspecified type    6. Recurrent pulmonary emboli    7. Chronic diastolic congestive heart failure    8. Malignant neoplasm of upper-outer quadrant of right breast in female, estrogen receptor positive    9. Right leg pain    10. Abdominal aortic aneurysm (AAA) without rupture          Plan:     Chest pain/shortness of breath  Stable  Prior troponin, BNP wnl,  D-dimer improving  Repeat CTA PE without evidence of PE, stable right-sided mass    History of right breast cancer  Enlarging lymph nodes that could be causing significant pain/discomfort  Follows with Hematology-Oncology    Diastolic heart failure  Echo with normal EF, mild-mod TR  BNP wnl last visit - may be falsely low due to patient's weight  Increase Lasix to 40 mg daily    Right leg pain- stable  DVT ultrasound recently without evidence of DVT   Normal ABIs  Obtain arterial ultrasound bilateral    Recurrent pulmonary embolus   Recurrent PE as of 2/22  PCP to evaluate regarding home O2  Continue OAC    Hypertension  Restart Coreg for now    History of CVA  Carotid  ultrasound no significant stenosis, recent MRI brain no abnormality  Currently not on aspirin as she is taking Eliquis  Continue statin    HLD   as of 4/22  Continue statin    AAA s/p transcutaneous patch  Stable    Morbid obesity, BMI > 40  Low-salt, low-fat diet  Exercise as tolerated      All labs and cardiac procedures reviewed.      Return to clinic in 2 weeks    Arlene Hobbs MD  Cardiology Staff

## 2022-07-06 NOTE — PATIENT INSTRUCTIONS
Stop taking Effexor, start taking Cymbalta 30mg daily for anxiety, depression, and pain    Continue taking Percocet 7.5mg as needed 4x a day for pain. May break the tablet in half to see if this is effective and does not cause you to be sleepy.    Stop taking Xanax. Start taking clonazepam 0.5mg twice daily.    Lets touch base in 1 week and see how you are doing and make adjustments as needed.

## 2022-07-06 NOTE — PROGRESS NOTES
Palliative Medicine  Consult  Consult Requested By: Dr. Jose Clarke  Reason for Consult: Pain management      SUBJECTIVE:     History of Present Illness:  Patient is a 72 y.o. year old female presenting with stage II ER positive breast cancer who was seen urgently per oncologist request to assist with pain management. I met Ms. Luther and her  Campos in the room. She reports that her right arm pain is severe and interfering with her mood, sleep, and quality of life. She started Lyrica 6 weeks ago with little benefit to date. She adds that she has endured a lot of medical issues lately including CVA*, DVT, and cancer. She feels as though her concerns are not being heard and not hearing the things she wanted to hear. It was her understanding that the breast mass is increasing yet surgery is not an option thus this has caused a lot of anxiety. She adds that the pain medication makes her sleepy and the Xanax is not helping her anxiety. From the interactions I witnessed she is an anxious lady due to a lot of serious medical issues on her plate and is having a hard time coping and de-escalating. Her  attempts to calm her down but unfortunately this exacerbates her anxiety. She has a reason to have pain and certainly can understand that the lesion would result in the described pain. However I feel that the pain is exacerbated by her mood and anxiety which in turn worsens both. She is no longer taking Effexor (out for one month), takes Xanax 0.5mg TID with no improvement, and poor sleep due to anxiety and health stressors. I validated her feelings, practiced redirection and mindfulness exercise, as well as assured that her concerns are heard and will be addressed. I added that my medication adjustments will likely not solve her issues nor work as fast as she might prefer but I have alexsander that we can get better control of her complaints. I recommend starting Cymbalta, rotating from Xanax to clonazepam  due to the propensity for rebound anxiety with Xanax, and would like her to continue Percocet (1/2 dose trial to test for relief and less somnolence) and Lyrica. Her CKD III limits escalation of Lyrica so will be aiming for multimodal control of neuropathy. The mention of adjustments and mindful exercises visibly improved her mood and demeanor. Massage of the right shoulder also improved her pain further supporting the psychosomatic source of worsening pain. I will plan to follow up in 1 week and adjust as needed. In the meantime I recommend she re-establish with her psychologist Cecily to continue CBT. She is worried about hypotension since her recent discharge and 24# weight gain and pedal edema which I attribute to not taking furosemide in 6 weeks. Thankfully she has an appointment with Dr. Hobbs this afternoon and will discuss in more detail.     *Of note, she was not aware that she did not suffer a CVA and we reviewed MRI together. Also it seems from my review of the imaging that her mass is not enlarging and is stable in size.     LA  reviewed and summarized:        Past Medical History:   Diagnosis Date    Chronic diastolic congestive heart failure 8/25/2020    Encounter for blood transfusion     History of repair of aneurysm of abdominal aorta using endovascular stent graft     DR Bonds    Hx of psychiatric care     Hypertension     Major depressive disorder, single episode, moderate with anxious distress 1/14/2020    Malignant neoplasm of upper-outer quadrant of right breast in female, estrogen receptor positive 3/14/2019    radiation    Psychiatric problem     Sleep apnea     Sleep difficulties     Stroke 2009    no residual defect    Therapy      Past Surgical History:   Procedure Laterality Date    APPENDECTOMY      AXILLARY NODE DISSECTION Right 12/02/2020    Procedure: LYMPHADENECTOMY, AXILLARY;  Surgeon: Artemio Starks MD;  Location: [facility];  Service: General;  Laterality:  Right;    BIOPSY OF AXILLARY NODE Right 2021    Procedure: BIOPSY, LYMPH NODE, AXILLARY;  Surgeon: Artemio Sutton MD;  Location: [address];  Service: General;  Laterality: Right;    BREAST BIOPSY      2019    BREAST LUMPECTOMY      2019     SECTION      x 1    OOPHORECTOMY      right     SENTINEL LYMPH NODE BIOPSY Right 2019    Procedure: BIOPSY, LYMPH NODE, SENTINEL;  Surgeon: Artemio Starks MD;  Location: [facility];  Service: General;  Laterality: Right;    splenic artery aneurysm repair      TONSILLECTOMY       Family History   Problem Relation Age of Onset    Diabetes Maternal Grandmother     Diabetes Maternal Grandfather     Diabetes Mother     Hypertension Mother     Sickle cell anemia Daughter     Breast cancer Neg Hx     Colon cancer Neg Hx     Ovarian cancer Neg Hx      Review of patient's allergies indicates:   Allergen Reactions    Pcn [penicillins] Hives       Medications:    Current Outpatient Medications:     albuterol (VENTOLIN HFA) 90 mcg/actuation inhaler, Inhale 2 puffs into the lungs every 4 (four) hours as needed for Shortness of Breath., Disp: 18 g, Rfl: 11    amLODIPine (NORVASC) 10 MG tablet, Take 1 tablet (10 mg total) by mouth once daily., Disp: 90 tablet, Rfl: 0    apixaban (ELIQUIS) 5 mg Tab, Take 1 tablet (5 mg total) by mouth 2 (two) times daily., Disp: 180 tablet, Rfl: 3    atorvastatin (LIPITOR) 40 MG tablet, Take 1 tablet (40 mg total) by mouth once daily., Disp: 90 tablet, Rfl: 3    butalbital-acetaminophen-caffeine -40 mg (FIORICET, ESGIC) -40 mg per tablet, TAKE 1 TABLET BY MOUTH DAILY AS NEEDED FOR PAIN OR HEADACHES., Disp: 30 tablet, Rfl: 1    carvediloL (COREG) 12.5 MG tablet, Take 12.5 mg by mouth once daily., Disp: , Rfl:     cholecalciferol, vitamin D3, 1,250 mcg (50,000 unit) capsule, Take 1 capsule (50,000 Units total) by mouth once a week. (Patient taking differently: Take 50,000 Units by mouth once a week.  on Friday), Disp: 12 capsule, Rfl: 0    clonazePAM (KLONOPIN) 0.5 MG disintegrating tablet, Take 1 tablet (0.5 mg total) by mouth 2 (two) times daily as needed (anxiety)., Disp: 60 tablet, Rfl: 0    diclofenac sodium (VOLTAREN) 1 % Gel, Apply 2 grams topically once daily., Disp: 450 g, Rfl: 1    doxazosin (CARDURA) 4 MG tablet, Take 1 tablet (4 mg total) by mouth every evening., Disp: 90 tablet, Rfl: 0    DULoxetine (CYMBALTA) 30 MG capsule, Take 1 capsule (30 mg total) by mouth once daily., Disp: 30 capsule, Rfl: 11    furosemide (LASIX) 20 MG tablet, take 1/2 tablet by mouth once a day., Disp: 90 tablet, Rfl: 0    hydrALAZINE (APRESOLINE) 100 MG tablet, Take 100 mg by mouth every 8 (eight) hours., Disp: , Rfl:     hydroCHLOROthiazide (HYDRODIURIL) 50 MG tablet, Take 1 tablet (50 mg total) by mouth once daily., Disp: 90 tablet, Rfl: 3    hydrOXYzine HCL (ATARAX) 25 MG tablet, Take 1 tablet (25 mg total) by mouth every evening. (Patient not taking: Reported on 7/6/2022), Disp: 60 tablet, Rfl: 0    letrozole (FEMARA) 2.5 mg Tab, Take 1 tablet by mouth once daily, Disp: 30 tablet, Rfl: 0    LIDOcaine (LIDODERM) 5 %, Place 2 patches onto the skin once daily. Remove & Discard patch within 12 hours or as directed by MD, Disp: 90 patch, Rfl: 1    multivitamin (THERAGRAN) per tablet, Take 1 tablet by mouth once daily., Disp: , Rfl:     oxyCODONE-acetaminophen (PERCOCET) 7.5-325 mg per tablet, Take 1 tablet by mouth every 6 (six) hours as needed for Pain., Disp: 60 tablet, Rfl: 0    pregabalin (LYRICA) 75 MG capsule, Take 1 capsule (75 mg total) by mouth 3 (three) times daily., Disp: 90 capsule, Rfl: 2    propranoloL (INDERAL) 40 MG tablet, Take 1 tablet (40 mg total) by mouth 2 (two) times daily. (Patient not taking: Reported on 7/6/2022), Disp: 60 tablet, Rfl: 5    telmisartan (MICARDIS) 80 MG Tab, TAKE 1 TABLET BY MOUTH ONCE DAILY REPLACES  LOSARTAN, Disp: 90 tablet, Rfl: 2    tiZANidine (ZANAFLEX) 4 MG  tablet, Take 1 tablet (4 mg total) by mouth every 8 (eight) hours as needed (muscle spasm). (Patient not taking: Reported on 7/6/2022), Disp: 20 tablet, Rfl: 0    zolpidem (AMBIEN) 5 MG Tab, Take 1 tablet (5 mg total) by mouth nightly as needed (sleep). (Patient not taking: Reported on 7/6/2022), Disp: 90 tablet, Rfl: 1    OBJECTIVE:     ROS:  Review of Systems   Constitutional: Positive for unexpected weight change (24lbs in 1 week). Negative for activity change and appetite change.   HENT: Negative for sore throat and trouble swallowing.    Respiratory: Positive for shortness of breath. Negative for cough.    Cardiovascular: Positive for leg swelling. Negative for chest pain.   Gastrointestinal: Negative for constipation and nausea.   Musculoskeletal: Positive for neck pain. Negative for back pain and gait problem.   Neurological: Negative for speech difficulty, weakness, numbness and headaches.   Psychiatric/Behavioral: Positive for sleep disturbance. Negative for confusion. The patient is nervous/anxious.        Review of Symptoms    Symptom Assessment (ESAS 0-10 Scale)  Pain:  10  Dyspnea:  0  Anxiety:  10  Nausea:  0  Depression:  10  Anorexia:  0  Fatigue:  0  Insomnia:  5  Restlessness:  0  Agitation:  5         ECOG Performance Status ndGndrndanddndend:nd nd2nd Living Arrangements:  Lives with spouse      Advance Care Planning   Advance Directives:   Medical Power of : Yes    Agent's Name:   Campos Luther [phone], 2: daughter Darío Kay [phone], 3: daughter Deepti Luther [phone]    Decision Making:  Patient answered questions and Family answered questions            Physical Exam:  Vitals: Temp: 97 °F (36.1 °C) (07/06/22 1500)  Pulse: 74 (07/06/22 1500)  Resp: 18 (07/06/22 1500)  BP: (!) 149/82 (07/06/22 1500)  Physical Exam  Vitals and nursing note reviewed.   Constitutional:       General: She is not in acute distress.     Appearance: Normal appearance. She is not ill-appearing.    HENT:      Head: Normocephalic and atraumatic.   Eyes:      Pupils: Pupils are equal, round, and reactive to light.   Cardiovascular:      Rate and Rhythm: Normal rate and regular rhythm.      Pulses: Normal pulses.      Heart sounds: Normal heart sounds. No murmur heard.  Pulmonary:      Effort: Pulmonary effort is normal. No respiratory distress.      Breath sounds: Normal breath sounds.   Abdominal:      General: Bowel sounds are normal. There is no distension.      Palpations: Abdomen is soft.      Tenderness: There is no abdominal tenderness.   Musculoskeletal:         General: Normal range of motion.      Cervical back: Normal range of motion.      Right lower leg: No edema.      Left lower leg: No edema.   Skin:     General: Skin is warm and dry.   Neurological:      Mental Status: She is alert and oriented to person, place, and time. Mental status is at baseline.      Cranial Nerves: No cranial nerve deficit.   Psychiatric:         Attention and Perception: Attention and perception normal.         Mood and Affect: Mood is anxious and depressed. Affect is tearful.         Speech: Speech normal.         Behavior: Behavior normal.         Thought Content: Thought content normal.         Cognition and Memory: Cognition and memory normal.         Judgment: Judgment normal.         Labs and radiology data reviewed    ASSESSMENT/PLAN:     Problem List Items Addressed This Visit        Neuro    Neuropathic pain    Overview     Continue Lyrica 75mg TID. I would not increase dose based on CKD III.    Start Cymbalta and assess response.                Psychiatric    Major depressive disorder, single episode, moderate with anxious distress - Primary    Overview     No longer taking Effexor (stopped 1 month ago). Start taking Cymbalta 30mg daily and plan to titrate to effect for mood and neuropathic pain.    Recommend re-establishing with psychiatry for CBT    Rotate from Xanax to Klonopin due to high propensity of  rebound anxiety. Klonopin 0.5mg BID, safe to titrate to 1mg BID if ineffective at the lower dose.           Psychological factors affecting medical condition    Overview     Mood disorder exacerbating pain that exacerbates anxiety- cyclical issue.      Recommended breathing and mindfulness exercises. Plans to re-establish with psychiatry (Cecily).              Oncology    Malignant neoplasm of upper-outer quadrant of right breast in female, estrogen receptor positive (Chronic)    Overview     Followed by hematology/oncology           Current Assessment & Plan     Under the care of Dr. Clarke.  Stable disease per last CT chest in June. Subpectoral lymph node continues to be problematic. Surgery not eager to operate given high risk of lymphedema or nerve injury. Monitoring closely with surveillance for now.           Neoplasm related pain    Current Assessment & Plan     Continue taking Percocet 7.5mg as needed 4x a day for pain. May break the tablet in half to see if this is effective and does not cause somnolence.    Further adjustments pending response to the other medication changes today.             Other Visit Diagnoses     Bilateral leg pain        Right arm pain                 Follow up: 1 week virtually      61 minutes total visit  45 minutes of total time spent on the encounter, which includes face to face time and non-face to face time preparing to see the patient (eg, review of tests), Obtaining and/or reviewing separately obtained history, Documenting clinical information in the electronic or other health record, Independently interpreting results (not separately reported) and communicating results to the patient/family/caregiver, or Care coordination (not separately reported).    16 minutes spent in discussing ACP    Signature: Alexus Soto PA-C

## 2022-07-06 NOTE — ASSESSMENT & PLAN NOTE
Under the care of Dr. Clarke.  Stable disease per last CT chest in June. Subpectoral lymph node continues to be problematic. Surgery not eager to operate given high risk of lymphedema or nerve injury. Monitoring closely with surveillance for now.

## 2022-07-06 NOTE — PROGRESS NOTES
Subjective:      DATE OF VISIT: 7/6/22     ?  Patient ID:?Susu Luther is a 72 y.o. female.?? MR#: [id number]   ?   PRIMARY ONCOLOGIST: Dr. Huynh --> Dr. Boles    ? Primary Care Providers:  Peace Arias MD, MD (General)     CHIEF COMPLAINT: ?  Follow-up, discussion of multidisciplinary tumor board consensus recommendation?? ?   ?   ONCOLOGIC DIAGNOSIS:  Stage IIA, pT2 pN0 cM0 grade 2 ER positive, VA negative, HER2 negative right breast invasive lobular carcinoma   ?   CURRENT TREATMENT:  Anastrozole    PAST TREATMENT:  Lumpectomy and sentinel lymph node biopsy, right, Dr. Starks, 02/28/2019  ?   ONCOLOGIC HISTORY:   ?   Oncology History   Malignant neoplasm of upper-outer quadrant of right breast in female, estrogen receptor positive   3/14/2019 Initial Diagnosis    Malignant neoplasm of upper-outer quadrant of right breast in female, estrogen receptor positive     3/27/2019 Cancer Staged    Staging form: Breast, AJCC 8th Edition  - Pathologic stage from 3/27/2019: Stage IIA (pT2, pN0(sn), cM0, G2, ER+, VA-, HER2-) - Signed by Guido Huynh MD on 4/4/2019 5/20/2019 - 6/18/2019 Radiation Therapy    Treatment Site Ref. ID Energy Dose/Fx (Gy) #Fx Dose Correction (Gy) Total Dose (Gy) Start Date End Date Elapsed Days   Boost Boost 18X 2.5 4 / 4 0 10 6/13/2019 6/18/2019 5   RtBreastAddMV Rt Breast 18X/6X 2.66 16 / 16 0 42.56 5/20/2019 6/12/2019 23            Cancer Staging  Malignant neoplasm of upper-outer quadrant of right breast in female, estrogen receptor positive  - Pathologic stage from 3/27/2019: Stage IIA (pT2, pN0(sn), cM0, G2, ER+, VA-, HER2-) - Signed by Guido Huynh MD on 4/4/2019        HPI    I had the pleasure of meeting Ms. Luther who is a 72-year-old obese  female with stage IIA (pT2, pN0(sn), cM0, G2, ER+, VA-, HER2-) invasive lobular carcinoma of right breast, status post lumpectomy with sentinel lymph node biopsy in 2019.  She has since been on aromatase inhibitor  and has been tolerating well.    Restaging PET-CT scan performed on 11/04/2020 revealed large hypermetabolic right subpectoral lymph node mass measuring 6.9 x 3.2 cm with SUV max of 13.0.    Right chest wall lymph node biopsy performed on 11/10/2020 revealed fragments of benign lymph node with no evidence of malignancy.  Excisional biopsy of right subpectoral lymph node was again performed on 12/02/2020 and returned as benign with no evidence of metastatic disease.    Case was discussed at the tumor conference and recommendation was to continue aromatase inhibitor with plan for repeat short interval imaging to reassess the right subpectoral lymph node mass.  This was discussed with patient however she was not satisfied with the recommendation and wanted a 2nd opinion with a different oncologist.    During previous visit with me, we decided to refer to breast surgeon in [facility]. She had MRI and has a planned surgical resection on 3/1/21.   Status post  Lymph node excision on 03/01/2021.       Interval Hx: 73 yo female with Stage IIA, pT2 pN0 cM0 grade 2 ER positive, CO negative, HER2 negative right breast invasive lobular carcinoma, s/p lumpectomy and sentinel LN sampling, currently on endocrine therapy. Presents today for routine follow up. Had recent PE, currently on eliquis.     She c/o left breast lump, had mammogram and US breast on 8/24/21 which showed a subcentimeter left breast mass. Denies shortness of breath, chest pain, fever, chills,diarrhea or dysuria. Continues to complain bilateral shoulder pain. She was started on lyrica for nerve pain control and referred to palliative care service.     On June 13th, 2022 she presented to the ER with right sided weakness concerning for CVA. She is currently undergoing speech and occupational therapy.     Review of Systems   Constitutional: Positive for fatigue.   Musculoskeletal: Positive for back pain and gait problem.   Neurological: Positive for speech difficulty,  weakness and numbness.   Psychiatric/Behavioral: The patient is nervous/anxious.        ?   A comprehensive 14-point review of systems was reviewed with patient and was negative other than as specified above.   ?   PAST MEDICAL HISTORY:   Past Medical History:   Diagnosis Date    Chronic diastolic congestive heart failure 2020    Encounter for blood transfusion     History of repair of aneurysm of abdominal aorta using endovascular stent graft     DR Bonds    Hx of psychiatric care     Hypertension     Major depressive disorder, single episode, moderate with anxious distress 2020    Malignant neoplasm of upper-outer quadrant of right breast in female, estrogen receptor positive 3/14/2019    radiation    Psychiatric problem     Sleep apnea     Sleep difficulties     Stroke 2009    no residual defect    Therapy     ?     PAST SURGICAL HISTORY:   Past Surgical History:   Procedure Laterality Date    APPENDECTOMY      AXILLARY NODE DISSECTION Right 2020    Procedure: LYMPHADENECTOMY, AXILLARY;  Surgeon: Artemio Starks MD;  Location: [facility];  Service: General;  Laterality: Right;    BIOPSY OF AXILLARY NODE Right 2021    Procedure: BIOPSY, LYMPH NODE, AXILLARY;  Surgeon: Artemio Sutton MD;  Location: [facility] OR [address] [place] [address];  Service: General;  Laterality: Right;    BREAST BIOPSY      2019    BREAST LUMPECTOMY      2019     SECTION      x 1    OOPHORECTOMY      right     SENTINEL LYMPH NODE BIOPSY Right 2019    Procedure: BIOPSY, LYMPH NODE, SENTINEL;  Surgeon: Artemio Starks MD;  Location: [facility] OR;  Service: General;  Laterality: Right;    splenic artery aneurysm repair      TONSILLECTOMY        ?   ALLERGIES:   Allergies as of 2022 - Reviewed 2022   Allergen Reaction Noted    Pcn [penicillins] Hives 2019      ?   MEDICATIONS:?   Outpatient Medications Marked as Taking for the 22 encounter (Office Visit) with Jose Clarke MD    Medication Sig Dispense Refill    albuterol (VENTOLIN HFA) 90 mcg/actuation inhaler Inhale 2 puffs into the lungs every 4 (four) hours as needed for Shortness of Breath. 18 g 11    ALPRAZolam (XANAX) 0.5 MG tablet Take 1 tablet (0.5 mg total) by mouth 2 (two) times daily as needed for Insomnia or Anxiety. 60 tablet 0    amLODIPine (NORVASC) 10 MG tablet Take 1 tablet (10 mg total) by mouth once daily. 90 tablet 0    apixaban (ELIQUIS) 5 mg Tab Take 1 tablet (5 mg total) by mouth 2 (two) times daily. 180 tablet 3    atorvastatin (LIPITOR) 40 MG tablet Take 1 tablet (40 mg total) by mouth once daily. 90 tablet 3    butalbital-acetaminophen-caffeine -40 mg (FIORICET, ESGIC) -40 mg per tablet TAKE 1 TABLET BY MOUTH DAILY AS NEEDED FOR PAIN OR HEADACHES. 30 tablet 1    carvediloL (COREG) 12.5 MG tablet Take 12.5 mg by mouth once daily.      cholecalciferol, vitamin D3, 1,250 mcg (50,000 unit) capsule Take 1 capsule (50,000 Units total) by mouth once a week. (Patient taking differently: Take 50,000 Units by mouth once a week. on Friday) 12 capsule 0    diclofenac sodium (VOLTAREN) 1 % Gel Apply 2 grams topically once daily. 450 g 1    doxazosin (CARDURA) 4 MG tablet Take 1 tablet (4 mg total) by mouth every evening. 90 tablet 0    furosemide (LASIX) 20 MG tablet take 1/2 tablet by mouth once a day. 90 tablet 0    hydrALAZINE (APRESOLINE) 100 MG tablet Take 100 mg by mouth every 8 (eight) hours.      hydroCHLOROthiazide (HYDRODIURIL) 50 MG tablet Take 1 tablet (50 mg total) by mouth once daily. 90 tablet 3    letrozole (FEMARA) 2.5 mg Tab Take 1 tablet by mouth once daily 30 tablet 0    LIDOcaine (LIDODERM) 5 % Place 2 patches onto the skin once daily. Remove & Discard patch within 12 hours or as directed by MD Conner patch 1    multivitamin (THERAGRAN) per tablet Take 1 tablet by mouth once daily.      oxyCODONE-acetaminophen (PERCOCET) 7.5-325 mg per tablet Take 1 tablet by mouth every 8  (eight) hours as needed for Pain. 60 tablet 0    pregabalin (LYRICA) 75 MG capsule Take 1 capsule (75 mg total) by mouth 3 (three) times daily. 90 capsule 2    telmisartan (MICARDIS) 80 MG Tab TAKE 1 TABLET BY MOUTH ONCE DAILY REPLACES  LOSARTAN 90 tablet 2      ?   SOCIAL HISTORY:?   Social History     Tobacco Use    Smoking status: Never Smoker    Smokeless tobacco: Never Used   Substance Use Topics    Alcohol use: No      ?      ?   FAMILY HISTORY:   family history includes Diabetes in her maternal grandfather, maternal grandmother, and mother; Hypertension in her mother; Sickle cell anemia in her daughter.   ?        Objective:      Physical Exam  Constitutional:       Appearance: She is obese.   Neurological:      Motor: Weakness present.      Gait: Gait abnormal.           ?   Vitals:    07/06/22 1401   BP: (!) 149/82   Pulse: 74   Temp: 98.6 °F (37 °C)      ?   ECOG:?1   General appearance: in no acute distress.   Head, eyes, ears, nose, and throat:  Sclerae anicteric  Respiratory:  No increased work of breathing  Abdomen:  Obese  Extremities: Warm, without edema.   Neurologic: Alert and oriented. Grossly normal strength, coordination, and gait.   Skin: No rashes, ecchymoses or petechial lesion.    Psychiatric:  Anxious, tearful       ?   Laboratory:  ?   Lab Visit on 07/06/2022   Component Date Value Ref Range Status    WBC 07/06/2022 9.59  3.90 - 12.70 K/uL Final    RBC 07/06/2022 4.34  4.00 - 5.40 M/uL Final    Hemoglobin 07/06/2022 10.5 (A) 12.0 - 16.0 g/dL Final    Hematocrit 07/06/2022 34.6 (A) 37.0 - 48.5 % Final    MCV 07/06/2022 80 (A) 82 - 98 fL Final    MCH 07/06/2022 24.2 (A) 27.0 - 31.0 pg Final    MCHC 07/06/2022 30.3 (A) 32.0 - 36.0 g/dL Final    RDW 07/06/2022 16.6 (A) 11.5 - 14.5 % Final    Platelets 07/06/2022 286  150 - 450 K/uL Final    MPV 07/06/2022 9.2  9.2 - 12.9 fL Final    Immature Granulocytes 07/06/2022 0.4  0.0 - 0.5 % Final    Gran # (ANC) 07/06/2022 5.3  1.8 -  7.7 K/uL Final    Immature Grans (Abs) 07/06/2022 0.04  0.00 - 0.04 K/uL Final    Lymph # 07/06/2022 3.1  1.0 - 4.8 K/uL Final    Mono # 07/06/2022 0.8  0.3 - 1.0 K/uL Final    Eos # 07/06/2022 0.2  0.0 - 0.5 K/uL Final    Baso # 07/06/2022 0.07  0.00 - 0.20 K/uL Final    nRBC 07/06/2022 0  0 /100 WBC Final    Gran % 07/06/2022 55.6  38.0 - 73.0 % Final    Lymph % 07/06/2022 32.7  18.0 - 48.0 % Final    Mono % 07/06/2022 8.7  4.0 - 15.0 % Final    Eosinophil % 07/06/2022 1.9  0.0 - 8.0 % Final    Basophil % 07/06/2022 0.7  0.0 - 1.9 % Final    Differential Method 07/06/2022 Automated   Final    Sodium 07/06/2022 144  136 - 145 mmol/L Final    Potassium 07/06/2022 4.4  3.5 - 5.1 mmol/L Final    Chloride 07/06/2022 110  95 - 110 mmol/L Final    CO2 07/06/2022 24  23 - 29 mmol/L Final    Glucose 07/06/2022 101  70 - 110 mg/dL Final    BUN 07/06/2022 28 (A) 8 - 23 mg/dL Final    Creatinine 07/06/2022 1.6 (A) 0.5 - 1.4 mg/dL Final    Calcium 07/06/2022 9.5  8.7 - 10.5 mg/dL Final    Total Protein 07/06/2022 7.3  6.0 - 8.4 g/dL Final    Albumin 07/06/2022 3.0 (A) 3.5 - 5.2 g/dL Final    Total Bilirubin 07/06/2022 0.3  0.1 - 1.0 mg/dL Final    Alkaline Phosphatase 07/06/2022 126  55 - 135 U/L Final    AST 07/06/2022 14  10 - 40 U/L Final    ALT 07/06/2022 15  10 - 44 U/L Final    Anion Gap 07/06/2022 10  8 - 16 mmol/L Final    eGFR if  07/06/2022 37 (A) >60 mL/min/1.73 m^2 Final    eGFR if non  07/06/2022 32 (A) >60 mL/min/1.73 m^2 Final        ?   Imaging:  ?    Results for orders placed or performed during the hospital encounter of 06/13/22 (from the past 2160 hour(s))   CTA Chest Non Coronary    Impression    No pulmonary arterial filling defect to indicate thromboembolism.    Stable appearance of right subpectoral mass and adjacent adenopathy.  Postsurgical change of the right axilla and right breast with minimal underlying reticulation within the peripheral  right middle lobe, which is suggestive of post radiation change.    Porcelain gallbladder.      Electronically signed by: Ayana Seay  Date:    06/14/2022  Time:    14:19   CT Head Without Contrast    Impression    No hemorrhage or acute major vascular distribution infarction.  Clinical correlation and further evaluation as warranted.    ASPECT score 10 of 10.    All CT scans at this facility are performed  using dose modulation techniques as appropriate to performed exam including the following:  automated exposure control; adjustment of mA and/or kV according to the patients size (this includes techniques or standardized protocols for targeted exams where dose is matched to indication/reason for exam: i.e. extremities or head);  iterative reconstruction technique.      Electronically signed by: Edouard Gomez  Date:    06/13/2022  Time:    20:16     *Note: Due to a large number of results and/or encounters for the requested time period, some results have not been displayed. A complete set of results can be found in Results Review.     Results for orders placed or performed during the hospital encounter of 06/13/22 (from the past 2160 hour(s))   MRI Brain Without Contrast    Impression    No acute intracranial abnormality.    Cerebellar tonsillar ectopia.      Electronically signed by: Rogelio Luther  Date:    06/15/2022  Time:    10:10     *Note: Due to a large number of results and/or encounters for the requested time period, some results have not been displayed. A complete set of results can be found in Results Review.     No results found. However, due to the size of the patient record, not all encounters were searched. Please check Results Review for a complete set of results.      Pathology:    I have reviewed the patient's medical history in detail and updated the computerized patient record.       ?   Assessment/Plan:   History of pulmonary embolism  -     CBC Auto Differential; Future; Expected date:  07/06/2022  -     Comprehensive Metabolic Panel; Future; Expected date: 07/06/2022    Malignant neoplasm of upper-outer quadrant of right breast in female, estrogen receptor positive  -     CBC Auto Differential; Future; Expected date: 07/06/2022  -     Comprehensive Metabolic Panel; Future; Expected date: 07/06/2022  -     Ambulatory referral/consult to CLINIC Palliative Care; Future; Expected date: 07/13/2022    Morbid obesity with BMI of 45.0-49.9, adult  -     CBC Auto Differential; Future; Expected date: 07/06/2022  -     Comprehensive Metabolic Panel; Future; Expected date: 07/06/2022       1. History of pulmonary embolism    2. Malignant neoplasm of upper-outer quadrant of right breast in female, estrogen receptor positive    3. Morbid obesity with BMI of 45.0-49.9, adult          Plan:     History of pulmonary embolism  Likely provoked due to hypercoagulable state from breast cancer and obesity. Continue apixaban at 5 mg b.i.d..  Advised that she will be on long-term anticoagulation given recurrent thrombosis history as well as active malignancy. Reviewed labs from 02/28/2022 and noted no concerning cytopenia.    Malignant neoplasm of upper-outer quadrant of right breast in female, estrogen receptor positive  72-year-old female with stage IIA (pT2, pN0(sn), cM0, G2, ER+, SC-, HER2-) invasive lobular carcinoma of right breast, status post lumpectomy with sentinel lymph node biopsy in 2019.  Currently on Arimidex and tolerating well.  DEXA scan ordered.  Yet to be obtained.     Unfortunately, imaging studies have shown persistently enlarged right subpectoral lymph node as well as FDG avid left posterior cervical and supraclavicular lymph nodes.  Multiple biopsies in the past have been nondiagnostic for malignancy.  Case was presented at multi-disciplinary tumor conference with recommendation to continue Arimidex.  Patient was not satisfied with recommendation a requested for 2nd opinion.     She was evaluated  by Dr. Sutton in [place] who recommended MRI of breast.     MRI was performed on 02/23/2021 and showed lymph node mass deep to the right pectoralis minor muscle measuring 6.6 x 4.7 x 2.9 cm. There were also adjacent satellite lymph nodes anteriorly to the dominant lymph node measured 1.1 x 0.8 cm and 0.6 x 0.4 cm.  Also described was a mass effect upon the right subclavian vein.  The mass does not in case or envelope the subclavian neurovascular structures.     Given the above findings, recommendation was made to excise the mass if not for diagnostic purposes for pain management.  She underwent lymph node excision on 03/01/2021, pathology returned back as lymph node with follicular hyperplasia. No evidence of metastatic carcinoma or lymphoma.     She was continued on Arimidex with plan for short interval PET scan.     Patient had a PET scan on 04/14/2021 that showed interim right axillary lymph node excision with large residual hypermetabolic right subpectoral lymph node mass.  Also noted interval development of subcentimeter mildly FDG avid left posterior cervical and supraclavicular lymph nodes. No FDG avid mediastinal or hilar nodes.No FDG avid pulmonary nodules/masses.      Had diagnostic bilateral mammography on 09/24/2021, results showed a left breast subcentimeter mass at the 9 o'clock position which is new when compared to mammography from 2019.  Will plan a short-interval mammogram in 6 months to re-assess left breast mass.There was no mammographic evidence of disease in the right breast.     Saw Dr. Sutton in [place] who recommended a repeat PET-CT scan. Results from PET-CT scan showed interval enlargement of subpectoral mass on the right with sustained highly FDG avidity.     Me was she continues to complain of shoulder pain and breast discomfort.  Most recently CT neck chest with contrast was obtained on 03/17/2022, when compared to PET scan from October of 2021, the right subpectoral lymph  node has not changed and still measures about 6 cm maximally.     Given continuing discomfort and pain associated with these enlarged lymph nodes.  She was referred to surgical oncologist following extensive conversation, it appears the risk of repeat surgical resection outweighs the benefits.  Decision was made to optimize neuropathic pain management.     Lyrica was initiated for neuropathic pain control. Meanwhile I recommend continued Arimidex and pain management.    Morbid obesity with BMI of 45.0-49.9, adult  Counseled on lifestyle modification and exercise.    TIA: Followed by neurology    History of pulmonary embolism  -     CBC Auto Differential; Future; Expected date: 07/06/2022  -     Comprehensive Metabolic Panel; Future; Expected date: 07/06/2022    Malignant neoplasm of upper-outer quadrant of right breast in female, estrogen receptor positive  -     CBC Auto Differential; Future; Expected date: 07/06/2022  -     Comprehensive Metabolic Panel; Future; Expected date: 07/06/2022  -     Ambulatory referral/consult to CLINIC Palliative Care; Future; Expected date: 07/13/2022    Morbid obesity with BMI of 45.0-49.9, adult  -     CBC Auto Differential; Future; Expected date: 07/06/2022  -     Comprehensive Metabolic Panel; Future; Expected date: 07/06/2022        Jose Clarke MD

## 2022-07-06 NOTE — ASSESSMENT & PLAN NOTE
Continue taking Percocet 7.5mg as needed 4x a day for pain. May break the tablet in half to see if this is effective and does not cause somnolence.    Further adjustments pending response to the other medication changes today.

## 2022-07-07 ENCOUNTER — TELEPHONE (OUTPATIENT)
Dept: INTERNAL MEDICINE | Facility: CLINIC | Age: 72
End: 2022-07-07
Payer: MEDICARE

## 2022-07-07 NOTE — TELEPHONE ENCOUNTER
----- Message from Lawanda Daly sent at 7/7/2022  3:42 PM CDT -----  Olesya calling from [facility] [phone] Re: would like to get a verbal order to continue with Occupational Therapy     Thanks bs

## 2022-07-11 ENCOUNTER — OFFICE VISIT (OUTPATIENT)
Dept: INTERNAL MEDICINE | Facility: CLINIC | Age: 72
End: 2022-07-11
Payer: MEDICARE

## 2022-07-11 VITALS
HEIGHT: 69 IN | WEIGHT: 293 LBS | DIASTOLIC BLOOD PRESSURE: 64 MMHG | SYSTOLIC BLOOD PRESSURE: 112 MMHG | HEART RATE: 85 BPM | RESPIRATION RATE: 18 BRPM | OXYGEN SATURATION: 97 % | TEMPERATURE: 96 F | BODY MASS INDEX: 43.4 KG/M2

## 2022-07-11 DIAGNOSIS — Z17.0 MALIGNANT NEOPLASM OF UPPER-OUTER QUADRANT OF RIGHT BREAST IN FEMALE, ESTROGEN RECEPTOR POSITIVE: Chronic | ICD-10-CM

## 2022-07-11 DIAGNOSIS — I10 UNCONTROLLED HYPERTENSION: ICD-10-CM

## 2022-07-11 DIAGNOSIS — C50.411 MALIGNANT NEOPLASM OF UPPER-OUTER QUADRANT OF RIGHT BREAST IN FEMALE, ESTROGEN RECEPTOR POSITIVE: Chronic | ICD-10-CM

## 2022-07-11 PROCEDURE — 4010F ACE/ARB THERAPY RXD/TAKEN: CPT | Mod: CPTII,S$GLB,, | Performed by: FAMILY MEDICINE

## 2022-07-11 PROCEDURE — 99999 PR PBB SHADOW E&M-EST. PATIENT-LVL III: ICD-10-PCS | Mod: PBBFAC,,, | Performed by: FAMILY MEDICINE

## 2022-07-11 PROCEDURE — 3078F PR MOST RECENT DIASTOLIC BLOOD PRESSURE < 80 MM HG: ICD-10-PCS | Mod: CPTII,S$GLB,, | Performed by: FAMILY MEDICINE

## 2022-07-11 PROCEDURE — 3008F PR BODY MASS INDEX (BMI) DOCUMENTED: ICD-10-PCS | Mod: CPTII,S$GLB,, | Performed by: FAMILY MEDICINE

## 2022-07-11 PROCEDURE — 3288F PR FALLS RISK ASSESSMENT DOCUMENTED: ICD-10-PCS | Mod: CPTII,S$GLB,, | Performed by: FAMILY MEDICINE

## 2022-07-11 PROCEDURE — 1126F PR PAIN SEVERITY QUANTIFIED, NO PAIN PRESENT: ICD-10-PCS | Mod: CPTII,S$GLB,, | Performed by: FAMILY MEDICINE

## 2022-07-11 PROCEDURE — 1157F PR ADVANCE CARE PLAN OR EQUIV PRESENT IN MEDICAL RECORD: ICD-10-PCS | Mod: CPTII,S$GLB,, | Performed by: FAMILY MEDICINE

## 2022-07-11 PROCEDURE — 4010F PR ACE/ARB THEARPY RXD/TAKEN: ICD-10-PCS | Mod: CPTII,S$GLB,, | Performed by: FAMILY MEDICINE

## 2022-07-11 PROCEDURE — 99215 PR OFFICE/OUTPT VISIT, EST, LEVL V, 40-54 MIN: ICD-10-PCS | Mod: S$GLB,,, | Performed by: FAMILY MEDICINE

## 2022-07-11 PROCEDURE — 3078F DIAST BP <80 MM HG: CPT | Mod: CPTII,S$GLB,, | Performed by: FAMILY MEDICINE

## 2022-07-11 PROCEDURE — 1100F PR PT FALLS ASSESS DOC 2+ FALLS/FALL W/INJURY/YR: ICD-10-PCS | Mod: CPTII,S$GLB,, | Performed by: FAMILY MEDICINE

## 2022-07-11 PROCEDURE — 3288F FALL RISK ASSESSMENT DOCD: CPT | Mod: CPTII,S$GLB,, | Performed by: FAMILY MEDICINE

## 2022-07-11 PROCEDURE — 99999 PR PBB SHADOW E&M-EST. PATIENT-LVL III: CPT | Mod: PBBFAC,,, | Performed by: FAMILY MEDICINE

## 2022-07-11 PROCEDURE — 3074F SYST BP LT 130 MM HG: CPT | Mod: CPTII,S$GLB,, | Performed by: FAMILY MEDICINE

## 2022-07-11 PROCEDURE — 99215 OFFICE O/P EST HI 40 MIN: CPT | Mod: S$GLB,,, | Performed by: FAMILY MEDICINE

## 2022-07-11 PROCEDURE — 1100F PTFALLS ASSESS-DOCD GE2>/YR: CPT | Mod: CPTII,S$GLB,, | Performed by: FAMILY MEDICINE

## 2022-07-11 PROCEDURE — 3074F PR MOST RECENT SYSTOLIC BLOOD PRESSURE < 130 MM HG: ICD-10-PCS | Mod: CPTII,S$GLB,, | Performed by: FAMILY MEDICINE

## 2022-07-11 PROCEDURE — 1126F AMNT PAIN NOTED NONE PRSNT: CPT | Mod: CPTII,S$GLB,, | Performed by: FAMILY MEDICINE

## 2022-07-11 PROCEDURE — 3008F BODY MASS INDEX DOCD: CPT | Mod: CPTII,S$GLB,, | Performed by: FAMILY MEDICINE

## 2022-07-11 PROCEDURE — 1157F ADVNC CARE PLAN IN RCRD: CPT | Mod: CPTII,S$GLB,, | Performed by: FAMILY MEDICINE

## 2022-07-11 RX ORDER — ZOLPIDEM TARTRATE 5 MG/1
5 TABLET ORAL NIGHTLY PRN
Qty: 90 TABLET | Refills: 1 | Status: SHIPPED | OUTPATIENT
Start: 2022-07-11 | End: 2023-01-11 | Stop reason: SDUPTHER

## 2022-07-11 RX ORDER — ATORVASTATIN CALCIUM 20 MG/1
20 TABLET, FILM COATED ORAL DAILY
Qty: 90 TABLET | Refills: 0 | Status: SHIPPED | OUTPATIENT
Start: 2022-07-11 | End: 2022-09-29 | Stop reason: SDUPTHER

## 2022-07-11 RX ORDER — FUROSEMIDE 20 MG/1
TABLET ORAL
Qty: 90 TABLET | Refills: 0 | Status: SHIPPED | OUTPATIENT
Start: 2022-07-11 | End: 2022-09-09 | Stop reason: SDUPTHER

## 2022-07-11 NOTE — PROGRESS NOTES
Subjective:       Patient ID: Susu Luther is a 72 y.o. female.    Chief Complaint: Follow-up    HPI Ms. Luther presents today for follow up.     Established with holistic medicine and they changed her xanax to Cymbalta.     She was also started on Klonopin   She does not like the way it makes her feel.   She did not take it today     Blood clots in March June stroke  Stopped BP medication because her pressure to low    Review of Systems   Constitutional: Positive for activity change and unexpected weight change.   HENT: Negative for hearing loss, rhinorrhea and trouble swallowing.    Eyes: Positive for visual disturbance. Negative for discharge.   Respiratory: Positive for chest tightness and wheezing.    Cardiovascular: Positive for chest pain and palpitations.   Gastrointestinal: Negative for blood in stool, constipation, diarrhea and vomiting.   Endocrine: Positive for polyuria. Negative for polydipsia.   Genitourinary: Positive for dysuria. Negative for difficulty urinating, hematuria and menstrual problem.   Musculoskeletal: Positive for arthralgias, joint swelling and neck pain.   Neurological: Positive for headaches. Negative for weakness.   Psychiatric/Behavioral: Positive for dysphoric mood.           Past Medical History:   Diagnosis Date    Chronic diastolic congestive heart failure 8/25/2020    Encounter for blood transfusion     History of repair of aneurysm of abdominal aorta using endovascular stent graft     DR Bonds    Hx of psychiatric care     Hypertension     Major depressive disorder, single episode, moderate with anxious distress 1/14/2020    Malignant neoplasm of upper-outer quadrant of right breast in female, estrogen receptor positive 3/14/2019    radiation    Psychiatric problem     Sleep apnea     Sleep difficulties     Stroke 2009    no residual defect    Therapy      Past Surgical History:   Procedure Laterality Date    APPENDECTOMY      AXILLARY NODE  DISSECTION Right 2020    Procedure: LYMPHADENECTOMY, AXILLARY;  Surgeon: Artemio Starks MD;  Location: [facility] [place];  Service: General;  Laterality: Right;    BIOPSY OF AXILLARY NODE Right 2021    Procedure: BIOPSY, LYMPH NODE, AXILLARY;  Surgeon: Artemio Sutton MD;  [facility];  Service: General;  Laterality: Right;    BREAST BIOPSY      2019    BREAST LUMPECTOMY      2019     SECTION      x 1    OOPHORECTOMY      right     SENTINEL LYMPH NODE BIOPSY Right 2019    Procedure: BIOPSY, LYMPH NODE, SENTINEL;  Surgeon: Artemio Starks MD;  Location: [facility] [place] OR;  Service: General;  Laterality: Right;    splenic artery aneurysm repair      TONSILLECTOMY       Family History   Problem Relation Age of Onset    Diabetes Maternal Grandmother     Diabetes Maternal Grandfather     Diabetes Mother     Hypertension Mother     Sickle cell anemia Daughter     Breast cancer Neg Hx     Colon cancer Neg Hx     Ovarian cancer Neg Hx      Social History     Socioeconomic History    Marital status:      Spouse name: Campos Luther    Number of children: 2   Tobacco Use    Smoking status: Never Smoker    Smokeless tobacco: Never Used   Substance and Sexual Activity    Alcohol use: No    Drug use: No    Sexual activity: Yes     Partners: Male     Birth control/protection: Post-menopausal     Social Determinants of Health     Financial Resource Strain: Medium Risk    Difficulty of Paying Living Expenses: Somewhat hard   Food Insecurity: No Food Insecurity    Worried About Running Out of Food in the Last Year: Never true    Ran Out of Food in the Last Year: Never true   Transportation Needs: No Transportation Needs    Lack of Transportation (Medical): No    Lack of Transportation (Non-Medical): No   Physical Activity: Inactive    Days of Exercise per Week: 0 days    Minutes of Exercise per Session: 0 min   Stress: Stress Concern Present    Feeling of Stress :  Very much   Social Connections: Socially Integrated    Frequency of Communication with Friends and Family: More than three times a week    Frequency of Social Gatherings with Friends and Family: Never    Attends Mu-ism Services: More than 4 times per year    Active Member of Clubs or Organizations: Yes    Attends Club or Organization Meetings: 1 to 4 times per year    Marital Status:    Housing Stability: Low Risk     Unable to Pay for Housing in the Last Year: No    Number of Places Lived in the Last Year: 1    Unstable Housing in the Last Year: No       Objective:        Physical Exam  Vitals reviewed.   Constitutional:       Appearance: Normal appearance.   HENT:      Head: Normocephalic and atraumatic.   Cardiovascular:      Rate and Rhythm: Normal rate.      Heart sounds: Murmur heard.   Pulmonary:      Effort: Pulmonary effort is normal.   Neurological:      General: No focal deficit present.      Mental Status: She is alert and oriented to person, place, and time.   Psychiatric:         Mood and Affect: Mood normal.         Behavior: Behavior normal.           Results for orders placed or performed in visit on 07/06/22   CBC Auto Differential   Result Value Ref Range    WBC 9.59 3.90 - 12.70 K/uL    RBC 4.34 4.00 - 5.40 M/uL    Hemoglobin 10.5 (L) 12.0 - 16.0 g/dL    Hematocrit 34.6 (L) 37.0 - 48.5 %    MCV 80 (L) 82 - 98 fL    MCH 24.2 (L) 27.0 - 31.0 pg    MCHC 30.3 (L) 32.0 - 36.0 g/dL    RDW 16.6 (H) 11.5 - 14.5 %    Platelets 286 150 - 450 K/uL    MPV 9.2 9.2 - 12.9 fL    Immature Granulocytes 0.4 0.0 - 0.5 %    Gran # (ANC) 5.3 1.8 - 7.7 K/uL    Immature Grans (Abs) 0.04 0.00 - 0.04 K/uL    Lymph # 3.1 1.0 - 4.8 K/uL    Mono # 0.8 0.3 - 1.0 K/uL    Eos # 0.2 0.0 - 0.5 K/uL    Baso # 0.07 0.00 - 0.20 K/uL    nRBC 0 0 /100 WBC    Gran % 55.6 38.0 - 73.0 %    Lymph % 32.7 18.0 - 48.0 %    Mono % 8.7 4.0 - 15.0 %    Eosinophil % 1.9 0.0 - 8.0 %    Basophil % 0.7 0.0 - 1.9 %     Differential Method Automated    Comprehensive Metabolic Panel   Result Value Ref Range    Sodium 144 136 - 145 mmol/L    Potassium 4.4 3.5 - 5.1 mmol/L    Chloride 110 95 - 110 mmol/L    CO2 24 23 - 29 mmol/L    Glucose 101 70 - 110 mg/dL    BUN 28 (H) 8 - 23 mg/dL    Creatinine 1.6 (H) 0.5 - 1.4 mg/dL    Calcium 9.5 8.7 - 10.5 mg/dL    Total Protein 7.3 6.0 - 8.4 g/dL    Albumin 3.0 (L) 3.5 - 5.2 g/dL    Total Bilirubin 0.3 0.1 - 1.0 mg/dL    Alkaline Phosphatase 126 55 - 135 U/L    AST 14 10 - 40 U/L    ALT 15 10 - 44 U/L    Anion Gap 10 8 - 16 mmol/L    eGFR if African American 37 (A) >60 mL/min/1.73 m^2    eGFR if non African American 32 (A) >60 mL/min/1.73 m^2     *Note: Due to a large number of results and/or encounters for the requested time period, some results have not been displayed. A complete set of results can be found in Results Review.       Assessment/Plan:     Malignant neoplasm of upper-outer quadrant of right breast in female, estrogen receptor positive  -     zolpidem (AMBIEN) 5 MG Tab; Take 1 tablet (5 mg total) by mouth nightly as needed (sleep).  Dispense: 90 tablet; Refill: 1    Uncontrolled hypertension  -     furosemide (LASIX) 20 MG tablet; Take 1 tablet by mouth once a day.  Dispense: 90 tablet; Refill: 0    Other orders  -     atorvastatin (LIPITOR) 20 MG tablet; Take 1 tablet (20 mg total) by mouth once daily.  Dispense: 90 tablet; Refill: 0    sent message to Ophthalmology   They want her to see vascular medicine   Will set this up       Follow up 2 weeks     Peace Arias MD  ON   Family Medicine

## 2022-07-12 ENCOUNTER — OFFICE VISIT (OUTPATIENT)
Dept: PALLIATIVE MEDICINE | Facility: CLINIC | Age: 72
End: 2022-07-12
Payer: MEDICARE

## 2022-07-12 DIAGNOSIS — R51.9 NONINTRACTABLE HEADACHE, UNSPECIFIED CHRONICITY PATTERN, UNSPECIFIED HEADACHE TYPE: ICD-10-CM

## 2022-07-12 DIAGNOSIS — F32.1 MAJOR DEPRESSIVE DISORDER, SINGLE EPISODE, MODERATE WITH ANXIOUS DISTRESS: ICD-10-CM

## 2022-07-12 DIAGNOSIS — F54 PSYCHOLOGICAL FACTORS AFFECTING MEDICAL CONDITION: ICD-10-CM

## 2022-07-12 DIAGNOSIS — M79.2 NEUROPATHIC PAIN: Primary | ICD-10-CM

## 2022-07-12 DIAGNOSIS — G89.3 NEOPLASM RELATED PAIN: ICD-10-CM

## 2022-07-12 PROCEDURE — 99214 OFFICE O/P EST MOD 30 MIN: CPT | Mod: 95,,, | Performed by: PHYSICIAN ASSISTANT

## 2022-07-12 PROCEDURE — 1160F RVW MEDS BY RX/DR IN RCRD: CPT | Mod: CPTII,95,, | Performed by: PHYSICIAN ASSISTANT

## 2022-07-12 PROCEDURE — 4010F PR ACE/ARB THEARPY RXD/TAKEN: ICD-10-PCS | Mod: CPTII,95,, | Performed by: PHYSICIAN ASSISTANT

## 2022-07-12 PROCEDURE — 1160F PR REVIEW ALL MEDS BY PRESCRIBER/CLIN PHARMACIST DOCUMENTED: ICD-10-PCS | Mod: CPTII,95,, | Performed by: PHYSICIAN ASSISTANT

## 2022-07-12 PROCEDURE — 99214 PR OFFICE/OUTPT VISIT, EST, LEVL IV, 30-39 MIN: ICD-10-PCS | Mod: 95,,, | Performed by: PHYSICIAN ASSISTANT

## 2022-07-12 PROCEDURE — 1159F PR MEDICATION LIST DOCUMENTED IN MEDICAL RECORD: ICD-10-PCS | Mod: CPTII,95,, | Performed by: PHYSICIAN ASSISTANT

## 2022-07-12 PROCEDURE — 1123F PR ADV CARE PLAN DISCUSSED, PLAN OR SURROGATE DOCUMENTED: ICD-10-PCS | Mod: CPTII,95,, | Performed by: PHYSICIAN ASSISTANT

## 2022-07-12 PROCEDURE — 1159F MED LIST DOCD IN RCRD: CPT | Mod: CPTII,95,, | Performed by: PHYSICIAN ASSISTANT

## 2022-07-12 PROCEDURE — 4010F ACE/ARB THERAPY RXD/TAKEN: CPT | Mod: CPTII,95,, | Performed by: PHYSICIAN ASSISTANT

## 2022-07-12 PROCEDURE — 1123F ACP DISCUSS/DSCN MKR DOCD: CPT | Mod: CPTII,95,, | Performed by: PHYSICIAN ASSISTANT

## 2022-07-13 RX ORDER — ALPRAZOLAM 0.5 MG/1
0.5 TABLET ORAL 2 TIMES DAILY PRN
Qty: 30 TABLET | Refills: 0 | Status: SHIPPED | OUTPATIENT
Start: 2022-07-13 | End: 2022-07-21 | Stop reason: SDUPTHER

## 2022-07-13 NOTE — ASSESSMENT & PLAN NOTE
Post fall, recommend ED evaluation. She reports she will reassess an hour after our call to determine whether she will go.    Concern of orthostasis with recently resuming diuresis and would like imaging of head in light of AC use and head trauma- all this explained to patient and she still refuses ED.

## 2022-07-13 NOTE — PROGRESS NOTES
Palliative Medicine         Follow up  Audio Only Telehealth Visit     The patient location is: home, [place]  The chief complaint leading to consultation is: pain follow up  Visit type: Virtual visit with audio only (telephone)  Total time spent with patient: 20     The reason for the audio only service rather than synchronous audio and video virtual visit was related to technical difficulties or patient preference/necessity.     Each patient to whom I provide medical services by telemedicine is:  (1) informed of the relationship between the physician and patient and the respective role of any other health care provider with respect to management of the patient; and (2) notified that they may decline to receive medical services by telemedicine and may withdraw from such care at any time. Patient verbally consented to receive this service via voice-only telephone call.    This service was not originating from a related E/M service provided within the previous 7 days nor will  to an E/M service or procedure within the next 24 hours or my soonest available appointment.  Prevailing standard of care was able to be met in this audio-only visit.    SUBJECTIVE:   Chief complaint: pain follow up    07/13/2022   Audio visit only today. She reports a fall just prior to my call. She does not remember the events around the fall and first memory was her  helping her up off the ground. She hit her head, busted her lip, has abdominal, hip, and arm pain. I recommended that she come to the ED for evaluation in light of her past hypotension, syncope, and AC usage with head trauma.  She has resumed her diuretic and does not feel any more lightheaded or weak than before and reports her BP has not been any lower. She declined and will take a Fioricet for her head pain as it feels more like a headache. This is another concerning symptom in light of AC usage but she again declines ED evaluation. She has not been taking the  clonazepam as she does not like the way it makes her feel. She does feel like the Cymbalta is starting to help her mood and anxiety but would like a refill on Xanax for now. The exam was limited today in light of her fall and more pressing issues. I will follow up in 2 weeks to continue discussing mood and neuropathic pain.     Past visits:  7/6/22: Patient is a 72 y.o. year old female presenting with stage II ER positive breast cancer who was seen urgently per oncologist request to assist with pain management. I met Ms. Luther and her  Campos in the room. She reports that her right arm pain is severe and interfering with her mood, sleep, and quality of life. She started Lyrica 6 weeks ago with little benefit to date. She adds that she has endured a lot of medical issues lately including CVA*, DVT, and cancer. She feels as though her concerns are not being heard and not hearing the things she wanted to hear. It was her understanding that the breast mass is increasing yet surgery is not an option thus this has caused a lot of anxiety. She adds that the pain medication makes her sleepy and the Xanax is not helping her anxiety. From the interactions I witnessed she is an anxious lady due to a lot of serious medical issues on her plate and is having a hard time coping and de-escalating. Her  attempts to calm her down but unfortunately this exacerbates her anxiety. She has a reason to have pain and certainly can understand that the lesion would result in the described pain. However I feel that the pain is exacerbated by her mood and anxiety which in turn worsens both. She is no longer taking Effexor (out for one month), takes Xanax 0.5mg TID with no improvement, and poor sleep due to anxiety and health stressors. I validated her feelings, practiced redirection and mindfulness exercise, as well as assured that her concerns are heard and will be addressed. I added that my medication adjustments will likely  not solve her issues nor work as fast as she might prefer but I have alexsander that we can get better control of her complaints. I recommend starting Cymbalta, rotating from Xanax to clonazepam due to the propensity for rebound anxiety with Xanax, and would like her to continue Percocet (1/2 dose trial to test for relief and less somnolence) and Lyrica. Her CKD III limits escalation of Lyrica so will be aiming for multimodal control of neuropathy. The mention of adjustments and mindful exercises visibly improved her mood and demeanor. Massage of the right shoulder also improved her pain further supporting the psychosomatic source of worsening pain. I will plan to follow up in 1 week and adjust as needed. In the meantime I recommend she re-establish with her psychologist Cecily to continue CBT. She is worried about hypotension since her recent discharge and 24# weight gain and pedal edema which I attribute to not taking furosemide in 6 weeks. Thankfully she has an appointment with Dr. Hobbs this afternoon and will discuss in more detail.      *Of note, she was not aware that she did not suffer a CVA and we reviewed MRI together. Also it seems from my review of the imaging that her mass is not enlarging and is stable in size.     [place]  reviewed and summarized:      OBJECTIVE:     ROS:  Review of Systems   Constitutional: Positive for fever.   Musculoskeletal: Positive for arthralgias.   Neurological: Positive for syncope and headaches. Negative for facial asymmetry, light-headedness and numbness.   Psychiatric/Behavioral: Positive for dysphoric mood. Negative for confusion. The patient is nervous/anxious.        Review of Symptoms    Symptom Assessment (ESAS 0-10 Scale)  Pain:  0  Dyspnea:  0  Anxiety:  0  Nausea:  0  Depression:  0  Anorexia:  0  Fatigue:  0  Insomnia:  0  Restlessness:  0  Agitation:  0           Advance Care Planning   Advance Directives:   Medical Power of : Yes    Agent's Name:    Campos Luther [phone], 2: daughter Darío Kay [phone], 3: daughter Deepti Luther [phone]    Decision Making:  Patient answered questions          Physical Exam:  Vitals:    Physical Exam  Vitals reviewed: audio only visit.         Radiology and laboratory data reviewed    ASSESSMENT/PLAN:     Problem List Items Addressed This Visit        Neuro    Headache    Current Assessment & Plan     Post fall, recommend ED evaluation. She reports she will reassess an hour after our call to determine whether she will go.    Concern of orthostasis with recently resuming diuresis and would like imaging of head in light of AC use and head trauma- all this explained to patient and she still refuses ED.           Neuropathic pain - Primary    Overview     Continue Lyrica 75mg TID. I would not increase dose based on CKD III.    Continue Cymbalta              Psychiatric    Major depressive disorder, single episode, moderate with anxious distress    Overview     Recently started taking Cymbalta 30mg daily and plan to titrate to effect for mood and neuropathic pain.     Recommend re-establishing with psychiatry for CBT    Did not like Klonopin and would like to rotate to Xanax for now.           Psychological factors affecting medical condition    Overview     Mood disorder exacerbating pain that exacerbates anxiety- cyclical issue.      Recommended breathing and mindfulness exercises. Plans to re-establish with psychiatry (Cecily).              Oncology    Neoplasm related pain    Overview     Continue taking Percocet 7.5mg as needed 4x a day for pain. May break the tablet in half to see if this is effective and does not cause somnolence.     Further adjustments pending response to the other medication changes made recently.                     Follow up: 2 weeks    25 minutes of total time spent on the encounter, which includes face to face time and non-face to face time preparing to see the patient (eg,  review of tests), Obtaining and/or reviewing separately obtained history, Documenting clinical information in the electronic or other health record, Independently interpreting results (not separately reported) and communicating results to the patient/family/caregiver, or Care coordination (not separately reported).    Signature: Alexus Soto PA-C

## 2022-07-14 ENCOUNTER — TELEPHONE (OUTPATIENT)
Dept: INTERNAL MEDICINE | Facility: CLINIC | Age: 72
End: 2022-07-14
Payer: MEDICARE

## 2022-07-14 NOTE — TELEPHONE ENCOUNTER
----- Message from Lizzie Askew sent at 7/14/2022  2:45 PM CDT -----  Contact: Olesya from [facility]  Is calling rg medication discrepancies and can be reached at [phone]//thanks/dbw

## 2022-07-14 NOTE — TELEPHONE ENCOUNTER
HH called and states Dr. Hobbs put the pt on lasix twice a day   And you recently saw her and you ordered it for once a day.       Also wants you to know that Alexus ([facility])  Ordered klonopin last Friday and they xanax yesterday.    is questing the order and wants if you will clarify the orders.   They also state that the patient really dont trust Alexus

## 2022-07-15 NOTE — TELEPHONE ENCOUNTER
Please take the lasix per Dr. Hobbs's instructions   Patient did not like the way the Klonopin made her feel. It appears Alexus put her back on what she was on before. She should be taking Xanax now not Klonopin

## 2022-07-18 ENCOUNTER — DOCUMENT SCAN (OUTPATIENT)
Dept: HOME HEALTH SERVICES | Facility: HOSPITAL | Age: 72
End: 2022-07-18
Payer: MEDICARE

## 2022-07-20 ENCOUNTER — DOCUMENT SCAN (OUTPATIENT)
Dept: HOME HEALTH SERVICES | Facility: HOSPITAL | Age: 72
End: 2022-07-20
Payer: MEDICARE

## 2022-07-20 ENCOUNTER — PATIENT MESSAGE (OUTPATIENT)
Dept: PULMONOLOGY | Facility: CLINIC | Age: 72
End: 2022-07-20
Payer: MEDICARE

## 2022-07-20 DIAGNOSIS — R06.02 SHORTNESS OF BREATH ON EXERTION: Primary | ICD-10-CM

## 2022-07-21 ENCOUNTER — OFFICE VISIT (OUTPATIENT)
Dept: INTERNAL MEDICINE | Facility: CLINIC | Age: 72
End: 2022-07-21
Payer: MEDICARE

## 2022-07-21 VITALS
OXYGEN SATURATION: 95 % | DIASTOLIC BLOOD PRESSURE: 70 MMHG | WEIGHT: 293 LBS | BODY MASS INDEX: 43.4 KG/M2 | SYSTOLIC BLOOD PRESSURE: 114 MMHG | HEIGHT: 69 IN | RESPIRATION RATE: 18 BRPM | TEMPERATURE: 97 F | HEART RATE: 82 BPM

## 2022-07-21 DIAGNOSIS — F32.1 MAJOR DEPRESSIVE DISORDER, SINGLE EPISODE, MODERATE WITH ANXIOUS DISTRESS: ICD-10-CM

## 2022-07-21 DIAGNOSIS — I10 ESSENTIAL HYPERTENSION: ICD-10-CM

## 2022-07-21 DIAGNOSIS — F54 PSYCHOLOGICAL FACTORS AFFECTING MEDICAL CONDITION: ICD-10-CM

## 2022-07-21 DIAGNOSIS — F41.9 ANXIETY: Primary | ICD-10-CM

## 2022-07-21 PROCEDURE — 4010F PR ACE/ARB THEARPY RXD/TAKEN: ICD-10-PCS | Mod: CPTII,S$GLB,, | Performed by: FAMILY MEDICINE

## 2022-07-21 PROCEDURE — 99999 PR PBB SHADOW E&M-EST. PATIENT-LVL IV: CPT | Mod: PBBFAC,,, | Performed by: FAMILY MEDICINE

## 2022-07-21 PROCEDURE — 99999 PR PBB SHADOW E&M-EST. PATIENT-LVL IV: ICD-10-PCS | Mod: PBBFAC,,, | Performed by: FAMILY MEDICINE

## 2022-07-21 PROCEDURE — 3078F DIAST BP <80 MM HG: CPT | Mod: CPTII,S$GLB,, | Performed by: FAMILY MEDICINE

## 2022-07-21 PROCEDURE — 1125F AMNT PAIN NOTED PAIN PRSNT: CPT | Mod: CPTII,S$GLB,, | Performed by: FAMILY MEDICINE

## 2022-07-21 PROCEDURE — 99215 OFFICE O/P EST HI 40 MIN: CPT | Mod: S$GLB,,, | Performed by: FAMILY MEDICINE

## 2022-07-21 PROCEDURE — 3074F PR MOST RECENT SYSTOLIC BLOOD PRESSURE < 130 MM HG: ICD-10-PCS | Mod: CPTII,S$GLB,, | Performed by: FAMILY MEDICINE

## 2022-07-21 PROCEDURE — 3288F FALL RISK ASSESSMENT DOCD: CPT | Mod: CPTII,S$GLB,, | Performed by: FAMILY MEDICINE

## 2022-07-21 PROCEDURE — 1125F PR PAIN SEVERITY QUANTIFIED, PAIN PRESENT: ICD-10-PCS | Mod: CPTII,S$GLB,, | Performed by: FAMILY MEDICINE

## 2022-07-21 PROCEDURE — 3078F PR MOST RECENT DIASTOLIC BLOOD PRESSURE < 80 MM HG: ICD-10-PCS | Mod: CPTII,S$GLB,, | Performed by: FAMILY MEDICINE

## 2022-07-21 PROCEDURE — 3008F PR BODY MASS INDEX (BMI) DOCUMENTED: ICD-10-PCS | Mod: CPTII,S$GLB,, | Performed by: FAMILY MEDICINE

## 2022-07-21 PROCEDURE — 1157F PR ADVANCE CARE PLAN OR EQUIV PRESENT IN MEDICAL RECORD: ICD-10-PCS | Mod: CPTII,S$GLB,, | Performed by: FAMILY MEDICINE

## 2022-07-21 PROCEDURE — 99215 PR OFFICE/OUTPT VISIT, EST, LEVL V, 40-54 MIN: ICD-10-PCS | Mod: S$GLB,,, | Performed by: FAMILY MEDICINE

## 2022-07-21 PROCEDURE — 1100F PR PT FALLS ASSESS DOC 2+ FALLS/FALL W/INJURY/YR: ICD-10-PCS | Mod: CPTII,S$GLB,, | Performed by: FAMILY MEDICINE

## 2022-07-21 PROCEDURE — 1100F PTFALLS ASSESS-DOCD GE2>/YR: CPT | Mod: CPTII,S$GLB,, | Performed by: FAMILY MEDICINE

## 2022-07-21 PROCEDURE — 3074F SYST BP LT 130 MM HG: CPT | Mod: CPTII,S$GLB,, | Performed by: FAMILY MEDICINE

## 2022-07-21 PROCEDURE — 3288F PR FALLS RISK ASSESSMENT DOCUMENTED: ICD-10-PCS | Mod: CPTII,S$GLB,, | Performed by: FAMILY MEDICINE

## 2022-07-21 PROCEDURE — 1159F PR MEDICATION LIST DOCUMENTED IN MEDICAL RECORD: ICD-10-PCS | Mod: CPTII,S$GLB,, | Performed by: FAMILY MEDICINE

## 2022-07-21 PROCEDURE — 1157F ADVNC CARE PLAN IN RCRD: CPT | Mod: CPTII,S$GLB,, | Performed by: FAMILY MEDICINE

## 2022-07-21 PROCEDURE — 3008F BODY MASS INDEX DOCD: CPT | Mod: CPTII,S$GLB,, | Performed by: FAMILY MEDICINE

## 2022-07-21 PROCEDURE — 4010F ACE/ARB THERAPY RXD/TAKEN: CPT | Mod: CPTII,S$GLB,, | Performed by: FAMILY MEDICINE

## 2022-07-21 PROCEDURE — 1159F MED LIST DOCD IN RCRD: CPT | Mod: CPTII,S$GLB,, | Performed by: FAMILY MEDICINE

## 2022-07-21 RX ORDER — ALPRAZOLAM 0.5 MG/1
0.5 TABLET ORAL 2 TIMES DAILY PRN
Qty: 60 TABLET | Refills: 0 | Status: SHIPPED | OUTPATIENT
Start: 2022-07-21 | End: 2022-09-06 | Stop reason: SDUPTHER

## 2022-07-21 NOTE — PROGRESS NOTES
Subjective:       Patient ID: Susu Luther is a 72 y.o. female.    Chief Complaint: Follow-up    HPI Ms. Luther presents today for follow up.   She feels that when her medication was changed she started being dizzy    Woke up one morning and fell in the bathroom. She does not remember going into the bathroom.   She doesn't remember getting out of her bed.   Hit her head when she fell   Went to    Week ago yesterday     Bruising on abdomen    Felt pressured by palliative care     She has felt for a while that every time she takes a step forward she is taking more steps back. She is nervous   Seeing 2 or three specialist and then myself.   She feels too many individuals are messing with medication.       Review of Systems   Constitutional: Negative for activity change, appetite change, fatigue and fever.   HENT: Negative for congestion, ear pain and sinus pressure.    Eyes: Negative for pain and visual disturbance.   Respiratory: Negative for cough, chest tightness and wheezing.    Cardiovascular: Negative for chest pain, palpitations and leg swelling.   Gastrointestinal: Negative for abdominal distention, abdominal pain, constipation, diarrhea, nausea and vomiting.   Endocrine: Negative for polydipsia and polyuria.   Genitourinary: Negative for difficulty urinating, dyspareunia, dysuria, flank pain and hematuria.   Musculoskeletal: Positive for arthralgias, back pain and myalgias.   Skin: Negative for rash.   Neurological: Negative for dizziness, tremors, syncope, weakness, numbness and headaches.   Psychiatric/Behavioral: Positive for sleep disturbance. Negative for agitation and confusion. The patient is nervous/anxious.            Past Medical History:   Diagnosis Date    Chronic diastolic congestive heart failure 8/25/2020    Encounter for blood transfusion     History of repair of aneurysm of abdominal aorta using endovascular stent graft     DR Bonds     of psychiatric care     Hypertension      Major depressive disorder, single episode, moderate with anxious distress 2020    Malignant neoplasm of upper-outer quadrant of right breast in female, estrogen receptor positive 3/14/2019    radiation    Psychiatric problem     Sleep apnea     Sleep difficulties     Stroke 2009    no residual defect    Therapy      Past Surgical History:   Procedure Laterality Date    APPENDECTOMY      AXILLARY NODE DISSECTION Right 2020    Procedure: LYMPHADENECTOMY, AXILLARY;  Surgeon: Artemio Starks MD;  [facility];  Service: General;  Laterality: Right;    BIOPSY OF AXILLARY NODE Right 2021    Procedure: BIOPSY, LYMPH NODE, AXILLARY;  Surgeon: Artemio Sutton MD;  Location: [address];  Service: General;  Laterality: Right;    BREAST BIOPSY      2019    BREAST LUMPECTOMY      2019     SECTION      x 1    OOPHORECTOMY      right     SENTINEL LYMPH NODE BIOPSY Right 2019    Procedure: BIOPSY, LYMPH NODE, SENTINEL;  Surgeon: Artemio Starks MD;  [facility];  Service: General;  Laterality: Right;    splenic artery aneurysm repair      TONSILLECTOMY           Objective:        Physical Exam  Vitals reviewed.   Constitutional:       Appearance: Normal appearance. She is obese.   HENT:      Head: Normocephalic and atraumatic.   Pulmonary:      Effort: Pulmonary effort is normal.   Neurological:      General: No focal deficit present.      Mental Status: She is alert and oriented to person, place, and time.   Psychiatric:         Mood and Affect: Mood normal.         Behavior: Behavior normal.      Comments: tearful           Results for orders placed or performed in visit on 22   CBC Auto Differential   Result Value Ref Range    WBC 9.59 3.90 - 12.70 K/uL    RBC 4.34 4.00 - 5.40 M/uL    Hemoglobin 10.5 (L) 12.0 - 16.0 g/dL    Hematocrit 34.6 (L) 37.0 - 48.5 %    MCV 80 (L) 82 - 98 fL    MCH 24.2 (L) 27.0 - 31.0 pg    MCHC 30.3 (L) 32.0 - 36.0 g/dL     RDW 16.6 (H) 11.5 - 14.5 %    Platelets 286 150 - 450 K/uL    MPV 9.2 9.2 - 12.9 fL    Immature Granulocytes 0.4 0.0 - 0.5 %    Gran # (ANC) 5.3 1.8 - 7.7 K/uL    Immature Grans (Abs) 0.04 0.00 - 0.04 K/uL    Lymph # 3.1 1.0 - 4.8 K/uL    Mono # 0.8 0.3 - 1.0 K/uL    Eos # 0.2 0.0 - 0.5 K/uL    Baso # 0.07 0.00 - 0.20 K/uL    nRBC 0 0 /100 WBC    Gran % 55.6 38.0 - 73.0 %    Lymph % 32.7 18.0 - 48.0 %    Mono % 8.7 4.0 - 15.0 %    Eosinophil % 1.9 0.0 - 8.0 %    Basophil % 0.7 0.0 - 1.9 %    Differential Method Automated    Comprehensive Metabolic Panel   Result Value Ref Range    Sodium 144 136 - 145 mmol/L    Potassium 4.4 3.5 - 5.1 mmol/L    Chloride 110 95 - 110 mmol/L    CO2 24 23 - 29 mmol/L    Glucose 101 70 - 110 mg/dL    BUN 28 (H) 8 - 23 mg/dL    Creatinine 1.6 (H) 0.5 - 1.4 mg/dL    Calcium 9.5 8.7 - 10.5 mg/dL    Total Protein 7.3 6.0 - 8.4 g/dL    Albumin 3.0 (L) 3.5 - 5.2 g/dL    Total Bilirubin 0.3 0.1 - 1.0 mg/dL    Alkaline Phosphatase 126 55 - 135 U/L    AST 14 10 - 40 U/L    ALT 15 10 - 44 U/L    Anion Gap 10 8 - 16 mmol/L    eGFR if African American 37 (A) >60 mL/min/1.73 m^2    eGFR if non African American 32 (A) >60 mL/min/1.73 m^2     *Note: Due to a large number of results and/or encounters for the requested time period, some results have not been displayed. A complete set of results can be found in Results Review.       Assessment/Plan:     Anxiety    Essential hypertension    Major depressive disorder, single episode, moderate with anxious distress  -     ALPRAZolam (XANAX) 0.5 MG tablet; Take 1 tablet (0.5 mg total) by mouth 2 (two) times daily as needed for Anxiety.  Dispense: 60 tablet; Refill: 0    Psychological factors affecting medical condition  -     ALPRAZolam (XANAX) 0.5 MG tablet; Take 1 tablet (0.5 mg total) by mouth 2 (two) times daily as needed for Anxiety.  Dispense: 60 tablet; Refill: 0          Follow up as needed     Peace Arias MD  [facility]

## 2022-07-22 ENCOUNTER — PATIENT MESSAGE (OUTPATIENT)
Dept: PALLIATIVE MEDICINE | Facility: CLINIC | Age: 72
End: 2022-07-22
Payer: MEDICARE

## 2022-07-25 ENCOUNTER — TELEPHONE (OUTPATIENT)
Dept: CARDIOLOGY | Facility: HOSPITAL | Age: 72
End: 2022-07-25
Payer: MEDICARE

## 2022-07-25 ENCOUNTER — DOCUMENT SCAN (OUTPATIENT)
Dept: HOME HEALTH SERVICES | Facility: HOSPITAL | Age: 72
End: 2022-07-25
Payer: MEDICARE

## 2022-07-28 DIAGNOSIS — Z17.0 MALIGNANT NEOPLASM OF UPPER-OUTER QUADRANT OF RIGHT BREAST IN FEMALE, ESTROGEN RECEPTOR POSITIVE: Chronic | ICD-10-CM

## 2022-07-28 DIAGNOSIS — C50.411 MALIGNANT NEOPLASM OF UPPER-OUTER QUADRANT OF RIGHT BREAST IN FEMALE, ESTROGEN RECEPTOR POSITIVE: Chronic | ICD-10-CM

## 2022-07-28 DIAGNOSIS — E66.01 MORBID OBESITY WITH BMI OF 45.0-49.9, ADULT: ICD-10-CM

## 2022-07-28 DIAGNOSIS — Z86.711 HISTORY OF PULMONARY EMBOLISM: ICD-10-CM

## 2022-07-28 DIAGNOSIS — G62.9 NEUROPATHY: ICD-10-CM

## 2022-07-28 RX ORDER — PREGABALIN 75 MG/1
75 CAPSULE ORAL 3 TIMES DAILY
Qty: 90 CAPSULE | Refills: 2 | Status: CANCELLED | OUTPATIENT
Start: 2022-07-28 | End: 2023-07-28

## 2022-07-29 DIAGNOSIS — C50.411 MALIGNANT NEOPLASM OF UPPER-OUTER QUADRANT OF RIGHT BREAST IN FEMALE, ESTROGEN RECEPTOR POSITIVE: Chronic | ICD-10-CM

## 2022-07-29 DIAGNOSIS — Z17.0 MALIGNANT NEOPLASM OF UPPER-OUTER QUADRANT OF RIGHT BREAST IN FEMALE, ESTROGEN RECEPTOR POSITIVE: Chronic | ICD-10-CM

## 2022-07-29 DIAGNOSIS — G62.9 NEUROPATHY: ICD-10-CM

## 2022-07-29 DIAGNOSIS — Z86.711 HISTORY OF PULMONARY EMBOLISM: ICD-10-CM

## 2022-07-29 DIAGNOSIS — E66.01 MORBID OBESITY WITH BMI OF 45.0-49.9, ADULT: ICD-10-CM

## 2022-07-29 RX ORDER — PREGABALIN 75 MG/1
75 CAPSULE ORAL 3 TIMES DAILY
Qty: 90 CAPSULE | Refills: 2 | Status: SHIPPED | OUTPATIENT
Start: 2022-07-29 | End: 2022-08-17 | Stop reason: SDUPTHER

## 2022-07-30 DIAGNOSIS — G89.3 NEOPLASM RELATED PAIN: ICD-10-CM

## 2022-08-01 RX ORDER — OXYCODONE AND ACETAMINOPHEN 7.5; 325 MG/1; MG/1
1 TABLET ORAL EVERY 6 HOURS PRN
Qty: 60 TABLET | Refills: 0 | OUTPATIENT
Start: 2022-08-01

## 2022-08-02 DIAGNOSIS — G89.3 NEOPLASM RELATED PAIN: ICD-10-CM

## 2022-08-02 NOTE — TELEPHONE ENCOUNTER
Please see the attached refill request. She does not wish to follow up with PM and indicated that you would be managing all medications moving forward. Opioid prescription to be managed at your discretion. Please let us know if she changes her mind and would like to follow up in clinic.

## 2022-08-03 RX ORDER — OXYCODONE AND ACETAMINOPHEN 7.5; 325 MG/1; MG/1
1 TABLET ORAL EVERY 6 HOURS PRN
Qty: 60 TABLET | Refills: 0 | Status: SHIPPED | OUTPATIENT
Start: 2022-08-03 | End: 2022-09-03 | Stop reason: SDUPTHER

## 2022-08-04 ENCOUNTER — DOCUMENT SCAN (OUTPATIENT)
Dept: HOME HEALTH SERVICES | Facility: HOSPITAL | Age: 72
End: 2022-08-04
Payer: MEDICARE

## 2022-08-05 DIAGNOSIS — G62.9 NEUROPATHY: ICD-10-CM

## 2022-08-05 DIAGNOSIS — E66.01 MORBID OBESITY WITH BMI OF 45.0-49.9, ADULT: ICD-10-CM

## 2022-08-05 DIAGNOSIS — C50.411 MALIGNANT NEOPLASM OF UPPER-OUTER QUADRANT OF RIGHT BREAST IN FEMALE, ESTROGEN RECEPTOR POSITIVE: Chronic | ICD-10-CM

## 2022-08-05 DIAGNOSIS — Z86.711 HISTORY OF PULMONARY EMBOLISM: ICD-10-CM

## 2022-08-05 DIAGNOSIS — Z17.0 MALIGNANT NEOPLASM OF UPPER-OUTER QUADRANT OF RIGHT BREAST IN FEMALE, ESTROGEN RECEPTOR POSITIVE: Chronic | ICD-10-CM

## 2022-08-09 ENCOUNTER — PATIENT MESSAGE (OUTPATIENT)
Dept: PHARMACY | Facility: CLINIC | Age: 72
End: 2022-08-09
Payer: MEDICARE

## 2022-08-09 ENCOUNTER — TELEPHONE (OUTPATIENT)
Dept: CARDIOLOGY | Facility: HOSPITAL | Age: 72
End: 2022-08-09
Payer: MEDICARE

## 2022-08-09 ENCOUNTER — OFFICE VISIT (OUTPATIENT)
Dept: CARDIOLOGY | Facility: CLINIC | Age: 72
End: 2022-08-09
Payer: MEDICARE

## 2022-08-09 ENCOUNTER — PATIENT MESSAGE (OUTPATIENT)
Dept: CARDIOLOGY | Facility: CLINIC | Age: 72
End: 2022-08-09
Payer: MEDICARE

## 2022-08-09 DIAGNOSIS — I50.32 CHRONIC DIASTOLIC CONGESTIVE HEART FAILURE: ICD-10-CM

## 2022-08-09 DIAGNOSIS — I26.99 RECURRENT PULMONARY EMBOLI: ICD-10-CM

## 2022-08-09 DIAGNOSIS — C50.411 MALIGNANT NEOPLASM OF UPPER-OUTER QUADRANT OF RIGHT BREAST IN FEMALE, ESTROGEN RECEPTOR POSITIVE: ICD-10-CM

## 2022-08-09 DIAGNOSIS — R55 SYNCOPE AND COLLAPSE: ICD-10-CM

## 2022-08-09 DIAGNOSIS — I10 HYPERTENSION, UNSPECIFIED TYPE: ICD-10-CM

## 2022-08-09 DIAGNOSIS — Z86.73 HISTORY OF CVA (CEREBROVASCULAR ACCIDENT): ICD-10-CM

## 2022-08-09 DIAGNOSIS — R07.9 CHEST PAIN, UNSPECIFIED TYPE: Primary | ICD-10-CM

## 2022-08-09 DIAGNOSIS — Z17.0 MALIGNANT NEOPLASM OF UPPER-OUTER QUADRANT OF RIGHT BREAST IN FEMALE, ESTROGEN RECEPTOR POSITIVE: ICD-10-CM

## 2022-08-09 DIAGNOSIS — E78.5 HYPERLIPIDEMIA, UNSPECIFIED HYPERLIPIDEMIA TYPE: ICD-10-CM

## 2022-08-09 DIAGNOSIS — I71.40 ABDOMINAL AORTIC ANEURYSM (AAA) WITHOUT RUPTURE: ICD-10-CM

## 2022-08-09 DIAGNOSIS — E66.01 MORBID OBESITY WITH BMI OF 45.0-49.9, ADULT: ICD-10-CM

## 2022-08-09 DIAGNOSIS — R53.1 RIGHT SIDED WEAKNESS: ICD-10-CM

## 2022-08-09 DIAGNOSIS — R06.02 SOB (SHORTNESS OF BREATH): ICD-10-CM

## 2022-08-09 DIAGNOSIS — M79.604 RIGHT LEG PAIN: ICD-10-CM

## 2022-08-09 PROCEDURE — 4010F PR ACE/ARB THEARPY RXD/TAKEN: ICD-10-PCS | Mod: CPTII,95,, | Performed by: STUDENT IN AN ORGANIZED HEALTH CARE EDUCATION/TRAINING PROGRAM

## 2022-08-09 PROCEDURE — 1157F PR ADVANCE CARE PLAN OR EQUIV PRESENT IN MEDICAL RECORD: ICD-10-PCS | Mod: CPTII,95,, | Performed by: STUDENT IN AN ORGANIZED HEALTH CARE EDUCATION/TRAINING PROGRAM

## 2022-08-09 PROCEDURE — 4010F ACE/ARB THERAPY RXD/TAKEN: CPT | Mod: CPTII,95,, | Performed by: STUDENT IN AN ORGANIZED HEALTH CARE EDUCATION/TRAINING PROGRAM

## 2022-08-09 PROCEDURE — 99213 PR OFFICE/OUTPT VISIT, EST, LEVL III, 20-29 MIN: ICD-10-PCS | Mod: 95,,, | Performed by: STUDENT IN AN ORGANIZED HEALTH CARE EDUCATION/TRAINING PROGRAM

## 2022-08-09 PROCEDURE — 1157F ADVNC CARE PLAN IN RCRD: CPT | Mod: CPTII,95,, | Performed by: STUDENT IN AN ORGANIZED HEALTH CARE EDUCATION/TRAINING PROGRAM

## 2022-08-09 PROCEDURE — 99213 OFFICE O/P EST LOW 20 MIN: CPT | Mod: 95,,, | Performed by: STUDENT IN AN ORGANIZED HEALTH CARE EDUCATION/TRAINING PROGRAM

## 2022-08-09 RX ORDER — PREGABALIN 75 MG/1
75 CAPSULE ORAL 3 TIMES DAILY
Qty: 90 CAPSULE | Refills: 2 | OUTPATIENT
Start: 2022-08-09 | End: 2023-08-09

## 2022-08-09 NOTE — PROGRESS NOTES
Subjective:   Patient ID:  Susu Luther is a 72 y.o. female who presents for follow up of No chief complaint on file.      The patient location is: home  The chief complaint leading to consultation is: chest pain    Visit type: audiovisual    Face to Face time with patient: 20 min  20 minutes of total time spent on the encounter, which includes face to face time and non-face to face time preparing to see the patient (eg, review of tests), Obtaining and/or reviewing separately obtained history, Documenting clinical information in the electronic or other health record, Independently interpreting results (not separately reported) and communicating results to the patient/family/caregiver, or Care coordination (not separately reported).         Each patient to whom he or she provides medical services by telemedicine is:  (1) informed of the relationship between the physician and patient and the respective role of any other health care provider with respect to management of the patient; and (2) notified that he or she may decline to receive medical services by telemedicine and may withdraw from such care at any time.    Notes:     12/1/20  69 yo female, care establish. Prior cardiologist Dr ackerman   [facility] HTN, CVA (2009), Right breast CA, Lumpectomy 3/2019, h/o PE off OAC 2 yrs ago.  AAA, s/p transcutaneous patch by Dr. Bonds, obesity knee OA imbalanced walker dependent  C/o SOB after walking few steps and dizziness. Had vision issue 1 month ago due to uncontrolled HTN  No chest pain  Sleeps with 2 pillows  Decent appetites  Leg calf pain worse at night  No smoking/drinking  ekg today NSR LVH.    ECH normal EF, grade II DD, LAE and PAP 56 mmHG   Chest CTA negative for PE  BP high    A1c controlled    S/p Open subpectoral lymph node biopsy on the right on 12/02/2020 by Dr. Starks  Still right chest, under arm and shoudler supersensitive pain      Shortness of Breath  Associated symptoms include  chest pain. Pertinent negatives include no abdominal pain, claudication, fever, headaches, leg swelling, neck pain, orthopnea, PND, rash, sputum production, syncope, vomiting or wheezing.   Chest Pain   Associated symptoms include shortness of breath. Pertinent negatives include no abdominal pain, back pain, claudication, cough, diaphoresis, dizziness, fever, headaches, irregular heartbeat, malaise/fatigue, nausea, near-syncope, numbness, orthopnea, palpitations, PND, sputum production, syncope, vomiting or weakness.   Her past medical history is significant for CHF.   Pertinent negatives for past medical history include no seizures.   Congestive Heart Failure  Associated symptoms include chest pain and shortness of breath. Pertinent negatives include no abdominal pain, claudication, near-syncope or palpitations.     2/28/22  Patient was admitted to [facility] for shortness of breath.  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  Started on heparin drip in transition to oral anticoagulation, now on Eliquis.  Recurrent PE.  On Femara. Has another lump in breast on right side, recently seen on PET scan.   Due to TANNER, was told to stop hctz, telmisartan.  She has ran out of clonidine. Does not take the ASA, hydralazine, amlodipine.   Blood pressure normotensive.  Reports severe headaches.  Has been out of Fioricet.  Echocardiogram with normal EF  Reports shortness of breath, chest heaviness mostly right-sided.      3/14/22  Still having SOB due to PE.  No bleeding issues while on eliquis.  Chest pain is substernal to right sided.   Lasix doesn't seem to help.   A reports intermittent leg pain right > left.  DVT ultrasound in-hospital with no evidence of DVT.  Following Hematology-Oncology.  Patient does not want surgery if needed for possible breast cancer.  Denies syncope, fever, chills.         4/18/22  Comes in with daughter who lives in [place].  Concerned that she may  oxygen issues and may need home oxygen. O2 98%  Reports LE swelling and SOB  Reports chest pain comes on with SOB, did not have chest pain prior to PE  Has not been on lasix, has been held  Reports balancing issues- can do PT once symptoms improve   Denies syncope, fever, chills.       5/16/22  Has been feeling weak last couple days  Feels chest tightness with walking, also CURIEL- feels worse than before. 97% O2 in clinic   Reports dizziness after taking medications   BP low today   Says recurrent cancer may be spreading   No bleeding on eliquis   Reports orthopnea, PND.  Not feeling well- Does not want to the hospital     Denies syncope.      6/13/22  Not feeling well today  Reports chest pain and SOB worsening over the last 2 weeks   Ddimer trending, downTroponin neg and BNP nl on 5/16/22  EKG today without significant abnormalities   Reports recent diagnosis of breast cancer is progressing  Reports right-sided arm weakness  Patient has been increasingly stressed    Denies syncope, lower extremity swelling.      7/6/2022   went to emergency room 6/13/2022, was worked up for stroke  MRI brain negative  Repeat CTA chest without PE, right-sided mass 6 cm, stable  All blood pressure medications.  Due to hypotension  Started taking Lasix today  Has significant lower extremity pain bilateral, reports blue feet at times      8/9/22  Virtual visit  Ambulates with walker   Had a fall recently due to syncopal episode, went to urgent care, negative workup per patient  Syncopal event occurred while standing up  Last visit Lasix was increased, patient reports increased thirst and water intake  Has also been taking carvedilol, reports low blood pressure  Chest pain worsened after syncopal event, has bruising on her body          EKG 6/13/22 NSR, LAD, LVH, 93 bpm, qtc 455 ms  EKG 5/16/22 SB, incomplete RBBB, LVH, septal infarct, qtc 422 ms   Echo 2/28/22  · The left ventricle is normal in size with concentric hypertrophy and  normal systolic function.  · The estimated ejection fraction is 60%.  · Normal left ventricular diastolic function.  · Normal right ventricular size with normal right ventricular systolic function.  · Mild to moderate tricuspid regurgitation.  · Normal central venous pressure (3 mmHg).  · The estimated PA systolic pressure is 36 mmHg.       Echo 2019  CONCLUSIONS     1 - Mild left atrial enlargement.     2 - Concentric hypertrophy.     3 - No wall motion abnormalities.     4 - Normal left ventricular systolic function (EF 60-65%).     5 - Impaired LV relaxation, elevated LAP (grade 2 diastolic dysfunction).     6 - Normal right ventricular systolic function .     7 - The estimated PA systolic pressure is 36 mmHg.     8 - Mild tricuspid regurgitation.        Past Medical History:   Diagnosis Date    Chronic diastolic congestive heart failure 2020    Encounter for blood transfusion     History of repair of aneurysm of abdominal aorta using endovascular stent graft     DR Bonds     of psychiatric care     Hypertension     Major depressive disorder, single episode, moderate with anxious distress 2020    Malignant neoplasm of upper-outer quadrant of right breast in female, estrogen receptor positive 3/14/2019    radiation    Psychiatric problem     Sleep apnea     Sleep difficulties     Stroke     no residual defect    Therapy        Past Surgical History:   Procedure Laterality Date    APPENDECTOMY      AXILLARY NODE DISSECTION Right 2020    Procedure: LYMPHADENECTOMY, AXILLARY;  Surgeon: Artemio Starks MD;  Location: [facility];  Service: General;  Laterality: Right;    BIOPSY OF AXILLARY NODE Right 2021    Procedure: BIOPSY, LYMPH NODE, AXILLARY;  Surgeon: Artemio Sutton MD;  Location: [facility] OR [address] [place] [address];  Service: General;  Laterality: Right;    BREAST BIOPSY      2019    BREAST LUMPECTOMY      2019     SECTION      x 1    OOPHORECTOMY      right     SENTINEL  LYMPH NODE BIOPSY Right 03/27/2019    Procedure: BIOPSY, LYMPH NODE, SENTINEL;  Surgeon: Artemio Starks MD;  Location[facility];  Service: General;  Laterality: Right;    splenic artery aneurysm repair      TONSILLECTOMY         Social History     Tobacco Use    Smoking status: Never Smoker    Smokeless tobacco: Never Used   Substance Use Topics    Alcohol use: No    Drug use: No       Family History   Problem Relation Age of Onset    Diabetes Maternal Grandmother     Diabetes Maternal Grandfather     Diabetes Mother     Hypertension Mother     Sickle cell anemia Daughter     Breast cancer Neg Hx     Colon cancer Neg Hx     Ovarian cancer Neg Hx          Review of Systems   Constitutional: Negative for decreased appetite, diaphoresis, fever, malaise/fatigue and night sweats.   HENT: Negative for nosebleeds.    Eyes: Negative for blurred vision and double vision.   Cardiovascular: Positive for chest pain and dyspnea on exertion. Negative for claudication, irregular heartbeat, leg swelling, near-syncope, orthopnea, palpitations, paroxysmal nocturnal dyspnea and syncope.   Respiratory: Positive for shortness of breath. Negative for cough, sleep disturbances due to breathing, snoring, sputum production and wheezing.    Endocrine: Negative for cold intolerance and polyuria.   Hematologic/Lymphatic: Does not bruise/bleed easily.   Skin: Negative for rash.   Musculoskeletal: Negative for back pain, falls, joint pain, joint swelling and neck pain.        Right chest breast and shoulder pain   Gastrointestinal: Negative for abdominal pain, heartburn, nausea and vomiting.   Genitourinary: Negative for dysuria, frequency and hematuria.   Neurological: Negative for difficulty with concentration, dizziness, focal weakness, headaches, light-headedness, numbness, seizures and weakness.   Psychiatric/Behavioral: Negative for depression, memory loss and substance abuse. The patient does not have insomnia.     Allergic/Immunologic: Negative for HIV exposure and hives.     There were no vitals filed for this visit.      Objective:   Physical Exam  Constitutional:       Comments: Mild distress.    HENT:      Head: Normocephalic.   Eyes:      Pupils: Pupils are equal, round, and reactive to light.   Neck:      Thyroid: No thyromegaly.      Vascular: Normal carotid pulses. No carotid bruit or JVD.   Cardiovascular:      Rate and Rhythm: Normal rate and regular rhythm.  No extrasystoles are present.     Chest Wall: PMI is not displaced.      Pulses: Normal pulses.      Heart sounds: Normal heart sounds. No murmur heard.    No gallop. No S3 sounds.   Pulmonary:      Effort: No respiratory distress.      Breath sounds: Normal breath sounds. No stridor.   Abdominal:      General: Bowel sounds are normal.      Palpations: Abdomen is soft.      Tenderness: There is no abdominal tenderness. There is no rebound.   Musculoskeletal:         General: Normal range of motion.   Skin:     Findings: No rash.   Neurological:      Mental Status: She is alert and oriented to person, place, and time.   Psychiatric:         Behavior: Behavior normal.         Lab Results   Component Value Date    CHOL 229 (H) 04/18/2022    CHOL 221 (H) 10/19/2020    CHOL 231 (H) 08/25/2020     Lab Results   Component Value Date    HDL 45 04/18/2022    HDL 55 10/19/2020    HDL 49 08/25/2020     Lab Results   Component Value Date    LDLCALC 150.4 04/18/2022    LDLCALC 137.4 10/19/2020    LDLCALC 135.6 08/25/2020     Lab Results   Component Value Date    TRIG 168 (H) 04/18/2022    TRIG 143 10/19/2020    TRIG 232 (H) 08/25/2020     Lab Results   Component Value Date    CHOLHDL 19.7 (L) 04/18/2022    CHOLHDL 24.9 10/19/2020    CHOLHDL 21.2 08/25/2020       Chemistry        Component Value Date/Time     07/06/2022 1312    K 4.4 07/06/2022 1312     07/06/2022 1312    CO2 24 07/06/2022 1312    BUN 28 (H) 07/06/2022 1312    CREATININE 1.6 (H) 07/06/2022 1312      07/06/2022 1312        Component Value Date/Time    CALCIUM 9.5 07/06/2022 1312    ALKPHOS 126 07/06/2022 1312    AST 14 07/06/2022 1312    ALT 15 07/06/2022 1312    BILITOT 0.3 07/06/2022 1312    ESTGFRAFRICA 37 (A) 07/06/2022 1312    EGFRNONAA 32 (A) 07/06/2022 1312          Lab Results   Component Value Date    HGBA1C 5.9 (H) 05/18/2019     Lab Results   Component Value Date    TSH 1.460 10/18/2021     Lab Results   Component Value Date    INR 0.9 02/15/2022    INR 1.0 11/10/2020    INR 1.0 05/19/2019     Lab Results   Component Value Date    WBC 9.59 07/06/2022    HGB 10.5 (L) 07/06/2022    HCT 34.6 (L) 07/06/2022    MCV 80 (L) 07/06/2022     07/06/2022     BMP  Sodium   Date Value Ref Range Status   07/06/2022 144 136 - 145 mmol/L Final     Potassium   Date Value Ref Range Status   07/06/2022 4.4 3.5 - 5.1 mmol/L Final     Chloride   Date Value Ref Range Status   07/06/2022 110 95 - 110 mmol/L Final     CO2   Date Value Ref Range Status   07/06/2022 24 23 - 29 mmol/L Final     BUN   Date Value Ref Range Status   07/06/2022 28 (H) 8 - 23 mg/dL Final     Creatinine   Date Value Ref Range Status   07/06/2022 1.6 (H) 0.5 - 1.4 mg/dL Final     Calcium   Date Value Ref Range Status   07/06/2022 9.5 8.7 - 10.5 mg/dL Final     Anion Gap   Date Value Ref Range Status   07/06/2022 10 8 - 16 mmol/L Final     eGFR if    Date Value Ref Range Status   07/06/2022 37 (A) >60 mL/min/1.73 m^2 Final     eGFR if non    Date Value Ref Range Status   07/06/2022 32 (A) >60 mL/min/1.73 m^2 Final     Comment:     Calculation used to obtain the estimated glomerular filtration  rate (eGFR) is the CKD-EPI equation.        BNP  @LABRCNTIP(BNP,BNPTRIAGEBLO)@  @LABRCNTIP(troponini)@  CrCl cannot be calculated (Patient's most recent lab result is older than the maximum 7 days allowed.).  No results found in the last 24 hours.  No results found in the last 24 hours.  No results found in the  last 24 hours.    Assessment:      1. Chest pain, unspecified type    2. Hyperlipidemia, unspecified hyperlipidemia type    3. Morbid obesity with BMI of 45.0-49.9, adult    4. History of CVA (cerebrovascular accident)    5. Hypertension, unspecified type    6. Recurrent pulmonary emboli    7. Chronic diastolic congestive heart failure    8. Malignant neoplasm of upper-outer quadrant of right breast in female, estrogen receptor positive    9. Abdominal aortic aneurysm (AAA) without rupture    10. Right sided weakness    11. SOB (shortness of breath)    12. Right leg pain          Plan:     Chest pain/shortness of breath  Worsening due to recent fall  Prior troponin, BNP wnl,  D-dimer improving  Repeat CTA PE without evidence of PE, stable right-sided mass    Syncope  Possibly due to dehydration  Stop Lasix, only take as needed  Stop Carvedilol  Obtain 14 day monitor     Hypertension  Reports hypotension  Hold carvedilol    History of right breast cancer  Enlarging lymph nodes that could be causing significant pain/discomfort  Follows with Hematology-Oncology    Diastolic heart failure  Echo with normal EF, mild-mod TR  BNP wnl    Right leg pain  DVT ultrasound recently without evidence of DVT   Normal ABIs  Arterial ultrasound bilateral pending    Recurrent pulmonary embolus   Recurrent PE as of 2/22  PCP to evaluate regarding home O2  Continue OAC    History of CVA  Carotid ultrasound no significant stenosis, recent MRI brain no abnormality  Currently not on aspirin as she is taking Eliquis  Continue statin    HLD   as of 4/22  Continue statin    AAA s/p transcutaneous patch  Stable    Morbid obesity, BMI > 40  Low-salt, low-fat diet  Exercise as tolerated      All labs and cardiac procedures reviewed.      Return to clinic in 2 weeks    Arlene Hobbs MD  Cardiology Staff

## 2022-08-11 ENCOUNTER — TELEPHONE (OUTPATIENT)
Dept: CARDIOLOGY | Facility: CLINIC | Age: 72
End: 2022-08-11
Payer: MEDICARE

## 2022-08-11 NOTE — TELEPHONE ENCOUNTER
Spoke with Jewels and informed her that Dr. Hobbs was not in clinic today and I will forward her message to Dr. Hobbs and once she responds we'll give her a call back. Jewels gave verbal understanding    ----- Message from Kailee Kramer sent at 8/11/2022  2:05 PM CDT -----  Contact: Jewels ([facility])  Jewels is calling in regards to patient gaining 5-6 pounds in a week, increase shortness of breath, and blood pressure was 132/90. Please call [phone]. Thank you s/g

## 2022-08-15 ENCOUNTER — LAB VISIT (OUTPATIENT)
Dept: LAB | Facility: HOSPITAL | Age: 72
End: 2022-08-15
Attending: FAMILY MEDICINE
Payer: MEDICARE

## 2022-08-15 DIAGNOSIS — Z86.711 HISTORY OF PULMONARY EMBOLISM: ICD-10-CM

## 2022-08-15 DIAGNOSIS — Z17.0 MALIGNANT NEOPLASM OF UPPER-OUTER QUADRANT OF RIGHT BREAST IN FEMALE, ESTROGEN RECEPTOR POSITIVE: Chronic | ICD-10-CM

## 2022-08-15 DIAGNOSIS — E66.01 MORBID OBESITY WITH BMI OF 45.0-49.9, ADULT: ICD-10-CM

## 2022-08-15 DIAGNOSIS — C50.411 MALIGNANT NEOPLASM OF UPPER-OUTER QUADRANT OF RIGHT BREAST IN FEMALE, ESTROGEN RECEPTOR POSITIVE: Chronic | ICD-10-CM

## 2022-08-15 PROCEDURE — 85025 COMPLETE CBC W/AUTO DIFF WBC: CPT | Performed by: INTERNAL MEDICINE

## 2022-08-15 PROCEDURE — 36415 COLL VENOUS BLD VENIPUNCTURE: CPT | Mod: PN | Performed by: INTERNAL MEDICINE

## 2022-08-15 PROCEDURE — G0179 MD RECERTIFICATION HHA PT: HCPCS | Mod: ,,, | Performed by: FAMILY MEDICINE

## 2022-08-15 PROCEDURE — 80053 COMPREHEN METABOLIC PANEL: CPT | Performed by: INTERNAL MEDICINE

## 2022-08-15 PROCEDURE — G0179 PR HOME HEALTH MD RECERTIFICATION: ICD-10-PCS | Mod: ,,, | Performed by: FAMILY MEDICINE

## 2022-08-16 LAB
ALBUMIN SERPL BCP-MCNC: 3 G/DL (ref 3.5–5.2)
ALP SERPL-CCNC: 115 U/L (ref 55–135)
ALT SERPL W/O P-5'-P-CCNC: 14 U/L (ref 10–44)
ANION GAP SERPL CALC-SCNC: 12 MMOL/L (ref 8–16)
AST SERPL-CCNC: 14 U/L (ref 10–40)
BASOPHILS # BLD AUTO: 0.06 K/UL (ref 0–0.2)
BASOPHILS NFR BLD: 0.8 % (ref 0–1.9)
BILIRUB SERPL-MCNC: 0.2 MG/DL (ref 0.1–1)
BUN SERPL-MCNC: 17 MG/DL (ref 8–23)
CALCIUM SERPL-MCNC: 9.5 MG/DL (ref 8.7–10.5)
CHLORIDE SERPL-SCNC: 109 MMOL/L (ref 95–110)
CO2 SERPL-SCNC: 21 MMOL/L (ref 23–29)
CREAT SERPL-MCNC: 1.3 MG/DL (ref 0.5–1.4)
DIFFERENTIAL METHOD: ABNORMAL
EOSINOPHIL # BLD AUTO: 0.2 K/UL (ref 0–0.5)
EOSINOPHIL NFR BLD: 2.6 % (ref 0–8)
ERYTHROCYTE [DISTWIDTH] IN BLOOD BY AUTOMATED COUNT: 17.3 % (ref 11.5–14.5)
EST. GFR  (NO RACE VARIABLE): 44.2 ML/MIN/1.73 M^2
GLUCOSE SERPL-MCNC: 129 MG/DL (ref 70–110)
HCT VFR BLD AUTO: 35 % (ref 37–48.5)
HGB BLD-MCNC: 10.4 G/DL (ref 12–16)
IMM GRANULOCYTES # BLD AUTO: 0.02 K/UL (ref 0–0.04)
IMM GRANULOCYTES NFR BLD AUTO: 0.3 % (ref 0–0.5)
LYMPHOCYTES # BLD AUTO: 2.6 K/UL (ref 1–4.8)
LYMPHOCYTES NFR BLD: 33 % (ref 18–48)
MCH RBC QN AUTO: 23.7 PG (ref 27–31)
MCHC RBC AUTO-ENTMCNC: 29.7 G/DL (ref 32–36)
MCV RBC AUTO: 80 FL (ref 82–98)
MONOCYTES # BLD AUTO: 0.5 K/UL (ref 0.3–1)
MONOCYTES NFR BLD: 6.2 % (ref 4–15)
NEUTROPHILS # BLD AUTO: 4.4 K/UL (ref 1.8–7.7)
NEUTROPHILS NFR BLD: 57.1 % (ref 38–73)
NRBC BLD-RTO: 0 /100 WBC
PLATELET # BLD AUTO: 280 K/UL (ref 150–450)
PMV BLD AUTO: 9.9 FL (ref 9.2–12.9)
POTASSIUM SERPL-SCNC: 4.2 MMOL/L (ref 3.5–5.1)
PROT SERPL-MCNC: 6.9 G/DL (ref 6–8.4)
RBC # BLD AUTO: 4.38 M/UL (ref 4–5.4)
SODIUM SERPL-SCNC: 142 MMOL/L (ref 136–145)
WBC # BLD AUTO: 7.75 K/UL (ref 3.9–12.7)

## 2022-08-17 DIAGNOSIS — Z86.711 HISTORY OF PULMONARY EMBOLISM: ICD-10-CM

## 2022-08-17 DIAGNOSIS — C50.411 MALIGNANT NEOPLASM OF UPPER-OUTER QUADRANT OF RIGHT BREAST IN FEMALE, ESTROGEN RECEPTOR POSITIVE: Chronic | ICD-10-CM

## 2022-08-17 DIAGNOSIS — E66.01 MORBID OBESITY WITH BMI OF 45.0-49.9, ADULT: ICD-10-CM

## 2022-08-17 DIAGNOSIS — G62.9 NEUROPATHY: ICD-10-CM

## 2022-08-17 DIAGNOSIS — Z17.0 MALIGNANT NEOPLASM OF UPPER-OUTER QUADRANT OF RIGHT BREAST IN FEMALE, ESTROGEN RECEPTOR POSITIVE: Chronic | ICD-10-CM

## 2022-08-18 RX ORDER — PREGABALIN 75 MG/1
75 CAPSULE ORAL 3 TIMES DAILY
Qty: 90 CAPSULE | Refills: 2 | Status: SHIPPED | OUTPATIENT
Start: 2022-08-18 | End: 2022-08-19

## 2022-08-18 NOTE — ASSESSMENT & PLAN NOTE
Likely provoked due to hypercoagulable state from breast cancer and obesity. Continue apixaban at 5 mg b.i.d..  Advised that she will be on long-term anticoagulation given recurrent thrombosis history as well as active malignancy.

## 2022-08-18 NOTE — PROGRESS NOTES
Subjective:       Patient ID: Susu Luther is a 72 y.o. female.    Chief Complaint:   1. Malignant neoplasm of upper-outer quadrant of right breast in female, estrogen receptor positive  Stage IIA (pT2, pN0(sn), cM0, G2, ER+, MO-, HER2-) invasive lobular carcinoma of right breast   2. Acute pulmonary embolism, unspecified pulmonary embolism type, unspecified whether acute cor pulmonale present       Current Treatment:  Femara 2.5mg po daily    Treatment History:  S/p lumpectomy & sentinel LN biopsy, right, Dr. Starks, 2/28/2019  XRT from 5/20/2019 to 6/18/2019    HPI: This is a 72-year-old obese  female with medical history significant for diastolic CHF HTN, major depressive disorder, sleep apnea, CVA, AAA repair, and Stage IIA (pT2, pN0(sn), cM0, G2, ER+, MO-, HER2-) invasive lobular carcinoma of right breast, status post lumpectomy with sentinel lymph node biopsy in 2019.  She has since been on aromatase inhibitor and has been tolerating well.     Restaging PET-CT scan performed on 11/4/2020 revealed large hypermetabolic right subpectoral lymph node mass measuring 6.9 x 3.2 cm with SUV max of 13.0.     Right chest wall lymph node biopsy performed on 11/10/2020 revealed fragments of benign lymph node with no evidence of malignancy.  Excisional biopsy of right subpectoral lymph node was again performed on 12/2/2020 and returned as benign with no evidence of metastatic disease.     Case was discussed at the tumor conference and recommendation was to continue aromatase inhibitor with plan for repeat short interval imaging to reassess the right subpectoral lymph node mass.  This was discussed with patient however she was not satisfied with the recommendation and wanted a 2nd opinion with a different oncologist.     She was referred to breast surgeon in [facility]. She had MRI and has a planned surgical resection on 3/1/2021. Status post lymph node excision on 3/1/2021.     Her primary  Hematologist/Oncologist is Dr. Clarke.    Interval History: Patient presents for follow up on Femara. She [facility] in [facility]. She reports adherence to Femara and calcium + vitamin D. She has viewed her lab results but has questions about them.     Reviewed labs with patient:   CBC:   Recent Labs   Lab 08/15/22  1513   WBC 7.75   RBC 4.38   Hemoglobin 10.4 L   Hematocrit 35.0 L   Platelets 280   MCV 80 L   MCH 23.7 L   MCHC 29.7 L     CMP:  Recent Labs   Lab 08/15/22  1513   Glucose 129 H   Calcium 9.5   Albumin 3.0 L   Total Protein 6.9   Sodium 142   Potassium 4.2   CO2 21 L   Chloride 109   BUN 17   Creatinine 1.3   Alkaline Phosphatase 115   ALT 14   AST 14   Total Bilirubin 0.2     She states she saw Palliative Care but was not sure why she was referred to them. She states she researched Pallative Care and became upset; she thought she was being referred for Hospice. Explained to her the relationship between hospice and palliative care and Dr. Clarke's reason for referring her to palliative care. Advised her to continue seeing Palliative Care for management of her anxiety. Reviewed Lyrica dosages with her; she was only taking it daily as opposed to TID. She states she will see how effective TID dosing is.     The patient location is: [place], [place]  The chief complaint leading to consultation is: breast cancer    Visit type: audiovisual    Face to Face time with patient: 41 minutes  53 minutes of total time spent on the encounter, which includes face to face time and non-face to face time preparing to see the patient (eg, review of tests), Obtaining and/or reviewing separately obtained history, Documenting clinical information in the electronic or other health record, Independently interpreting results (not separately reported) and communicating results to the patient/family/caregiver, or Care coordination (not separately reported).     Each patient to whom he or she  provides medical services by telemedicine is:  (1) informed of the relationship between the physician and patient and the respective role of any other health care provider with respect to management of the patient; and (2) notified that he or she may decline to receive medical services by telemedicine and may withdraw from such care at any time.    Social History     Socioeconomic History    Marital status:      Spouse name: Campos Luther    Number of children: 2   Tobacco Use    Smoking status: Never Smoker    Smokeless tobacco: Never Used   Substance and Sexual Activity    Alcohol use: No    Drug use: No    Sexual activity: Yes     Partners: Male     Birth control/protection: Post-menopausal     Social Determinants of Health     Financial Resource Strain: Medium Risk    Difficulty of Paying Living Expenses: Somewhat hard   Food Insecurity: No Food Insecurity    Worried About Running Out of Food in the Last Year: Never true    Ran Out of Food in the Last Year: Never true   Transportation Needs: No Transportation Needs    Lack of Transportation (Medical): No    Lack of Transportation (Non-Medical): No   Physical Activity: Inactive    Days of Exercise per Week: 0 days    Minutes of Exercise per Session: 0 min   Stress: Stress Concern Present    Feeling of Stress : Very much   Social Connections: Socially Integrated    Frequency of Communication with Friends and Family: More than three times a week    Frequency of Social Gatherings with Friends and Family: Never    Attends Islam Services: More than 4 times per year    Active Member of Clubs or Organizations: Yes    Attends Club or Organization Meetings: 1 to 4 times per year    Marital Status:    Housing Stability: Low Risk     Unable to Pay for Housing in the Last Year: No    Number of Places Lived in the Last Year: 1    Unstable Housing in the Last Year: No     Past Medical History:   Diagnosis Date    Chronic diastolic  congestive heart failure 2020    Encounter for blood transfusion     History of repair of aneurysm of abdominal aorta using endovascular stent graft     DR Bonds    Hx of psychiatric care     Hypertension     Major depressive disorder, single episode, moderate with anxious distress 2020    Malignant neoplasm of upper-outer quadrant of right breast in female, estrogen receptor positive 3/14/2019    radiation    Psychiatric problem     Sleep apnea     Sleep difficulties     Stroke 2009    no residual defect    Therapy      Family History   Problem Relation Age of Onset    Diabetes Maternal Grandmother     Diabetes Maternal Grandfather     Diabetes Mother     Hypertension Mother     Sickle cell anemia Daughter     Breast cancer Neg Hx     Colon cancer Neg Hx     Ovarian cancer Neg Hx      Past Surgical History:   Procedure Laterality Date    APPENDECTOMY      AXILLARY NODE DISSECTION Right 2020    Procedure: LYMPHADENECTOMY, AXILLARY;  Surgeon: Artemio Starks MD;  Location: [facility] OR;  Service: General;  Laterality: Right;    BIOPSY OF AXILLARY NODE Right 2021    Procedure: BIOPSY, LYMPH NODE, AXILLARY;  Surgeon: Artemio Sutton MD;  Location: [address];  Service: General;  Laterality: Right;    BREAST BIOPSY          BREAST LUMPECTOMY      2019     SECTION      x 1    OOPHORECTOMY      right     SENTINEL LYMPH NODE BIOPSY Right 2019    Procedure: BIOPSY, LYMPH NODE, SENTINEL;  Surgeon: Artemio Starks MD;  Location: [facility] OR;  Service: General;  Laterality: Right;    splenic artery aneurysm repair      TONSILLECTOMY       Review of Systems   Constitutional: Negative for appetite change and fatigue.   HENT: Negative for mouth sores, rhinorrhea and sore throat.    Eyes: Negative.    Respiratory: Negative.    Cardiovascular: Negative.    Gastrointestinal: Negative for constipation, diarrhea, nausea and vomiting.   Endocrine: Negative.     Genitourinary: Negative.    Musculoskeletal:        Right arm edema   Integumentary:  Negative.   Allergic/Immunologic: Negative.    Neurological: Positive for numbness (right arm). Negative for weakness.   Hematological: Negative.    Psychiatric/Behavioral: Negative.        Medication List with Changes/Refills   Current Medications    ALBUTEROL (VENTOLIN HFA) 90 MCG/ACTUATION INHALER    Inhale 2 puffs into the lungs every 4 (four) hours as needed for Shortness of Breath.    ALPRAZOLAM (XANAX) 0.5 MG TABLET    Take 1 tablet (0.5 mg total) by mouth 2 (two) times daily as needed for Anxiety.    APIXABAN (ELIQUIS) 5 MG TAB    Take 1 tablet (5 mg total) by mouth 2 (two) times daily.    ATORVASTATIN (LIPITOR) 20 MG TABLET    Take 1 tablet (20 mg total) by mouth once daily.    BUTALBITAL-ACETAMINOPHEN-CAFFEINE -40 MG (FIORICET, ESGIC) -40 MG PER TABLET    Take 1 tablet by mouth daily as needed for Pain or Headaches.    CARVEDILOL (COREG) 12.5 MG TABLET    Take 12.5 mg by mouth once daily.    CHOLECALCIFEROL, VITAMIN D3, 1,250 MCG (50,000 UNIT) CAPSULE    Take 1 capsule (50,000 Units total) by mouth once a week.    DICLOFENAC SODIUM (VOLTAREN) 1 % GEL    Apply 2 grams topically once daily.    DOXAZOSIN (CARDURA) 4 MG TABLET    Take 1 tablet (4 mg total) by mouth every evening.    DULOXETINE (CYMBALTA) 30 MG CAPSULE    Take 1 capsule (30 mg total) by mouth once daily.    FUROSEMIDE (LASIX) 20 MG TABLET    Take 1 tablet by mouth once a day.    HYDROCHLOROTHIAZIDE (HYDRODIURIL) 50 MG TABLET    Take 1 tablet (50 mg total) by mouth once daily.    LETROZOLE (FEMARA) 2.5 MG TAB    TAKE 1 TABLET EVERY DAY    LIDOCAINE (LIDODERM) 5 %    Place 2 patches onto the skin once daily. Remove & Discard patch within 12 hours or as directed by MD    MULTIVITAMIN (THERAGRAN) PER TABLET    Take 1 tablet by mouth once daily.    OXYCODONE-ACETAMINOPHEN (PERCOCET) 7.5-325 MG PER TABLET    Take 1 tablet by mouth every 6 (six) hours  as needed for Pain.    PREGABALIN (LYRICA) 75 MG CAPSULE    Take 1 capsule (75 mg total) by mouth 3 (three) times daily.    TIZANIDINE (ZANAFLEX) 4 MG TABLET    Take 1 tablet (4 mg total) by mouth every 8 (eight) hours as needed (muscle spasm).    ZOLPIDEM (AMBIEN) 5 MG TAB    Take 1 tablet (5 mg total) by mouth nightly as needed (sleep).     Objective:   There were no vitals filed for this visit.    Physical Exam     Unable to assess due to virtual visit.    (2) Ambulatory and capable of self care, unable to carry out work activity, up and about > 50% or waking hours  Assessment:     Problem List Items Addressed This Visit        Hematology    Acute pulmonary embolism     Likely provoked due to hypercoagulable state from breast cancer and obesity. Continue apixaban at 5 mg b.i.d..  Advised that she will be on long-term anticoagulation given recurrent thrombosis history as well as active malignancy.              Oncology    Malignant neoplasm of upper-outer quadrant of right breast in female, estrogen receptor positive - Primary (Chronic)     72-year-old female with stage IIA (pT2, pN0(sn), cM0, G2, ER+, MD-, HER2-) invasive lobular carcinoma of right breast, status post lumpectomy with sentinel lymph node biopsy in 2019.  Currently on Femara and tolerating well.  DEXA scan ordered.  Yet to be obtained.     Unfortunately, imaging studies have shown persistently enlarged right subpectoral lymph node as well as FDG avid left posterior cervical and supraclavicular lymph nodes.  Multiple biopsies in the past have been nondiagnostic for malignancy.  Case was presented at multi-disciplinary tumor conference with recommendation to continue Arimidex.  Patient was not satisfied with recommendation a requested for 2nd opinion.     She was evaluated by Dr. Sutton in [place] who recommended MRI of breast.     MRI was performed on 02/23/2021 and showed lymph node mass deep to the right pectoralis minor muscle measuring 6.6  x 4.7 x 2.9 cm. There were also adjacent satellite lymph nodes anteriorly to the dominant lymph node measured 1.1 x 0.8 cm and 0.6 x 0.4 cm.  Also described was a mass effect upon the right subclavian vein.  The mass does not in case or envelope the subclavian neurovascular structures.     Given the above findings, recommendation was made to excise the mass if not for diagnostic purposes for pain management.  She underwent lymph node excision on 03/01/2021, pathology returned back as lymph node with follicular hyperplasia. No evidence of metastatic carcinoma or lymphoma.     She was continued on Femara with plan for short interval PET scan.     Patient had a PET scan on 4/14/2021 that showed interim right axillary lymph node excision with large residual hypermetabolic right subpectoral lymph node mass.  Also noted interval development of subcentimeter mildly FDG avid left posterior cervical and supraclavicular lymph nodes. No FDG avid mediastinal or hilar nodes.No FDG avid pulmonary nodules/masses.      Had diagnostic bilateral mammography on 9/24/2021, results showed a left breast subcentimeter mass at the 9 o'clock position which is new when compared to mammography from 2019.  Will plan a short-interval mammogram in 6 months to re-assess left breast mass.There was no mammographic evidence of disease in the right breast.     Saw Dr. Sutton in [place] who recommended a repeat PET-CT scan. Results from PET-CT scan showed interval enlargement of subpectoral mass on the right with sustained highly FDG avidity.     Me was she continues to complain of shoulder pain and breast discomfort.  Most recently CT neck chest with contrast was obtained on 03/17/2022, when compared to PET scan from October of 2021, the right subpectoral lymph node has not changed and still measures about 6 cm maximally.     Given continuing discomfort and pain associated with these enlarged lymph nodes.  She was referred to surgical oncologist  following extensive conversation, it appears the risk of repeat surgical resection outweighs the benefits.  Decision was made to optimize neuropathic pain management.     Lyrica was initiated for neuropathic pain control. Meanwhile I recommend continued Femara and pain management.               Plan:     Malignant neoplasm of upper-outer quadrant of right breast in female, estrogen receptor positive    Acute pulmonary embolism, unspecified pulmonary embolism type, unspecified whether acute cor pulmonale present    Labs reviewed.   Continue Femara daily with calcium + vitamin D.  Eliquis 5mg po BID.  Will consult with  regarding prescription cost assistance.   Follow up in 4 weeks with CBC and Comprehensive Metabolic Panel.    I will review assessment/plan with collaborating physician Dr. Clarke.      JACKELINE Stewart

## 2022-08-18 NOTE — PROGRESS NOTES
Subjective:       Patient ID: Susu Luther is a 72 y.o. female.    Chief Complaint:   1. Malignant neoplasm of upper-outer quadrant of right breast in female, estrogen receptor positive     2. Acute pulmonary embolism, unspecified pulmonary embolism type, unspecified whether acute cor pulmonale present       Current Treatment:  Femara 2.5mg po daily    Treatment History:  S/p lumpectomy & sentinel LN biopsy, right, Dr. Starks, 2/28/2019  XRT from 5/20/2019 to 6/18/2019    HPI: This is a 72-year-old obese  female with medical history significant for diastolic CHF HTN, major depressive disorder, sleep apnea, CVA, AAA repair, and   Stage IIA (pT2, pN0(sn), cM0, G2, ER+, DE-, HER2-) invasive lobular carcinoma of right breast, status post lumpectomy with sentinel lymph node biopsy in 2019.  She has since been on aromatase inhibitor and has been tolerating well.     Restaging PET-CT scan performed on 11/4/2020 revealed large hypermetabolic right subpectoral lymph node mass measuring 6.9 x 3.2 cm with SUV max of 13.0.     Right chest wall lymph node biopsy performed on 11/10/2020 revealed fragments of benign lymph node with no evidence of malignancy.  Excisional biopsy of right subpectoral lymph node was again performed on 12/2/2020 and returned as benign with no evidence of metastatic disease.     Case was discussed at the tumor conference and recommendation was to continue aromatase inhibitor with plan for repeat short interval imaging to reassess the right subpectoral lymph node mass.  This was discussed with patient however she was not satisfied with the recommendation and wanted a 2nd opinion with a different oncologist.     During previous visit with me, we decided to refer to breast surgeon in [facility]. She had MRI and has a planned surgical resection on 3/1/2021. Status post lymph node excision on 3/1/2021.     Her primary Hematologist/Oncologist is Dr. Clarke.    Interval History: Patient  presents for follow up on Femara.    Reviewed labs with patient:   CBC:   Recent Labs   Lab 08/15/22  1513   WBC 7.75   RBC 4.38   Hemoglobin 10.4 L   Hematocrit 35.0 L   Platelets 280   MCV 80 L   MCH 23.7 L   MCHC 29.7 L     CMP:  Recent Labs   Lab 08/15/22  1513   Glucose 129 H   Calcium 9.5   Albumin 3.0 L   Total Protein 6.9   Sodium 142   Potassium 4.2   CO2 21 L   Chloride 109   BUN 17   Creatinine 1.3   Alkaline Phosphatase 115   ALT 14   AST 14   Total Bilirubin 0.2     The patient location is: ***  The chief complaint leading to consultation is: ***    Visit type: {TELE AUDIOVISUAL:83116}    Face to Face time with patient: ***  *** minutes of total time spent on the encounter, which includes face to face time and non-face to face time preparing to see the patient (eg, review of tests), Obtaining and/or reviewing separately obtained history, Documenting clinical information in the electronic or other health record, Independently interpreting results (not separately reported) and communicating results to the patient/family/caregiver, or Care coordination (not separately reported).     Each patient to whom he or she provides medical services by telemedicine is:  (1) informed of the relationship between the physician and patient and the respective role of any other health care provider with respect to management of the patient; and (2) notified that he or she may decline to receive medical services by telemedicine and may withdraw from such care at any time.    Social History     Socioeconomic History    Marital status:      Spouse name: Campos Luther    Number of children: 2   Tobacco Use    Smoking status: Never Smoker    Smokeless tobacco: Never Used   Substance and Sexual Activity    Alcohol use: No    Drug use: No    Sexual activity: Yes     Partners: Male     Birth control/protection: Post-menopausal     Social Determinants of Health     Financial Resource Strain: Medium Risk     Difficulty of Paying Living Expenses: Somewhat hard   Food Insecurity: No Food Insecurity    Worried About Running Out of Food in the Last Year: Never true    Ran Out of Food in the Last Year: Never true   Transportation Needs: No Transportation Needs    Lack of Transportation (Medical): No    Lack of Transportation (Non-Medical): No   Physical Activity: Inactive    Days of Exercise per Week: 0 days    Minutes of Exercise per Session: 0 min   Stress: Stress Concern Present    Feeling of Stress : Very much   Social Connections: Socially Integrated    Frequency of Communication with Friends and Family: More than three times a week    Frequency of Social Gatherings with Friends and Family: Never    Attends Spiritism Services: More than 4 times per year    Active Member of Clubs or Organizations: Yes    Attends Club or Organization Meetings: 1 to 4 times per year    Marital Status:    Housing Stability: Low Risk     Unable to Pay for Housing in the Last Year: No    Number of Places Lived in the Last Year: 1    Unstable Housing in the Last Year: No     Past Medical History:   Diagnosis Date    Chronic diastolic congestive heart failure 8/25/2020    Encounter for blood transfusion     History of repair of aneurysm of abdominal aorta using endovascular stent graft     DR Bonds    Hx of psychiatric care     Hypertension     Major depressive disorder, single episode, moderate with anxious distress 1/14/2020    Malignant neoplasm of upper-outer quadrant of right breast in female, estrogen receptor positive 3/14/2019    radiation    Psychiatric problem     Sleep apnea     Sleep difficulties     Stroke 2009    no residual defect    Therapy      Family History   Problem Relation Age of Onset    Diabetes Maternal Grandmother     Diabetes Maternal Grandfather     Diabetes Mother     Hypertension Mother     Sickle cell anemia Daughter     Breast cancer Neg Hx     Colon cancer Neg Hx      Ovarian cancer Neg Hx      Past Surgical History:   Procedure Laterality Date    APPENDECTOMY      AXILLARY NODE DISSECTION Right 2020    Procedure: LYMPHADENECTOMY, AXILLARY;  Surgeon: Artemio Starks MD;  Location: [facility] OR;  Service: General;  Laterality: Right;    BIOPSY OF AXILLARY NODE Right 2021    Procedure: BIOPSY, LYMPH NODE, AXILLARY;  Surgeon: Artemio Sutton MD;  Location: [address];  Service: General;  Laterality: Right;    BREAST BIOPSY          BREAST LUMPECTOMY      2019     SECTION      x 1    OOPHORECTOMY      right     SENTINEL LYMPH NODE BIOPSY Right 2019    Procedure: BIOPSY, LYMPH NODE, SENTINEL;  Surgeon: Artemio Starks MD;  Location: [facility] OR;  Service: General;  Laterality: Right;    splenic artery aneurysm repair      TONSILLECTOMY       Review of Systems    Medication List with Changes/Refills   Current Medications    ALBUTEROL (VENTOLIN HFA) 90 MCG/ACTUATION INHALER    Inhale 2 puffs into the lungs every 4 (four) hours as needed for Shortness of Breath.    ALPRAZOLAM (XANAX) 0.5 MG TABLET    Take 1 tablet (0.5 mg total) by mouth 2 (two) times daily as needed for Anxiety.    APIXABAN (ELIQUIS) 5 MG TAB    Take 1 tablet (5 mg total) by mouth 2 (two) times daily.    ATORVASTATIN (LIPITOR) 20 MG TABLET    Take 1 tablet (20 mg total) by mouth once daily.    BUTALBITAL-ACETAMINOPHEN-CAFFEINE -40 MG (FIORICET, ESGIC) -40 MG PER TABLET    Take 1 tablet by mouth daily as needed for Pain or Headaches.    CARVEDILOL (COREG) 12.5 MG TABLET    Take 12.5 mg by mouth once daily.    CHOLECALCIFEROL, VITAMIN D3, 1,250 MCG (50,000 UNIT) CAPSULE    Take 1 capsule (50,000 Units total) by mouth once a week.    DICLOFENAC SODIUM (VOLTAREN) 1 % GEL    Apply 2 grams topically once daily.    DOXAZOSIN (CARDURA) 4 MG TABLET    Take 1 tablet (4 mg total) by mouth every evening.    DULOXETINE (CYMBALTA) 30 MG CAPSULE    Take 1 capsule (30 mg total) by  mouth once daily.    FUROSEMIDE (LASIX) 20 MG TABLET    Take 1 tablet by mouth once a day.    HYDROCHLOROTHIAZIDE (HYDRODIURIL) 50 MG TABLET    Take 1 tablet (50 mg total) by mouth once daily.    LETROZOLE (FEMARA) 2.5 MG TAB    TAKE 1 TABLET EVERY DAY    LIDOCAINE (LIDODERM) 5 %    Place 2 patches onto the skin once daily. Remove & Discard patch within 12 hours or as directed by MD    MULTIVITAMIN (THERAGRAN) PER TABLET    Take 1 tablet by mouth once daily.    OXYCODONE-ACETAMINOPHEN (PERCOCET) 7.5-325 MG PER TABLET    Take 1 tablet by mouth every 6 (six) hours as needed for Pain.    PREGABALIN (LYRICA) 75 MG CAPSULE    Take 1 capsule (75 mg total) by mouth 3 (three) times daily.    TIZANIDINE (ZANAFLEX) 4 MG TABLET    Take 1 tablet (4 mg total) by mouth every 8 (eight) hours as needed (muscle spasm).    ZOLPIDEM (AMBIEN) 5 MG TAB    Take 1 tablet (5 mg total) by mouth nightly as needed (sleep).     Objective:   There were no vitals filed for this visit.    Physical Exam     Unable to assess due to virtual visit.    {ONC ECOG Performance Status w  link:[id number]}  Assessment:     Problem List Items Addressed This Visit        Oncology    Malignant neoplasm of upper-outer quadrant of right breast in female, estrogen receptor positive - Primary (Chronic)        Plan:     Malignant neoplasm of upper-outer quadrant of right breast in female, estrogen receptor positive    Labs reviewed.   Continue Femara daily with calcium + vitamin D.  Eliquis 5mg po BID  Follow up in {1-6 weeks:76329} with {Lab list:29255}.    I will review assessment/plan with collaborating physician Dr. Clarke.      JACKELINE Stewart

## 2022-08-19 ENCOUNTER — HOSPITAL ENCOUNTER (OUTPATIENT)
Dept: CARDIOLOGY | Facility: HOSPITAL | Age: 72
Discharge: HOME | End: 2022-08-19
Attending: STUDENT IN AN ORGANIZED HEALTH CARE EDUCATION/TRAINING PROGRAM
Payer: MEDICARE

## 2022-08-19 ENCOUNTER — OFFICE VISIT (OUTPATIENT)
Dept: HEMATOLOGY/ONCOLOGY | Facility: CLINIC | Age: 72
End: 2022-08-19
Payer: MEDICARE

## 2022-08-19 ENCOUNTER — EXTERNAL HOME HEALTH (OUTPATIENT)
Dept: HOME HEALTH SERVICES | Facility: HOSPITAL | Age: 72
End: 2022-08-19
Payer: MEDICARE

## 2022-08-19 VITALS
WEIGHT: 293 LBS | SYSTOLIC BLOOD PRESSURE: 114 MMHG | BODY MASS INDEX: 43.4 KG/M2 | DIASTOLIC BLOOD PRESSURE: 70 MMHG | HEIGHT: 69 IN

## 2022-08-19 DIAGNOSIS — C50.411 MALIGNANT NEOPLASM OF UPPER-OUTER QUADRANT OF RIGHT BREAST IN FEMALE, ESTROGEN RECEPTOR POSITIVE: Primary | ICD-10-CM

## 2022-08-19 DIAGNOSIS — M79.604 RIGHT LEG PAIN: ICD-10-CM

## 2022-08-19 DIAGNOSIS — E66.01 MORBID OBESITY WITH BMI OF 45.0-49.9, ADULT: ICD-10-CM

## 2022-08-19 DIAGNOSIS — Z86.711 HISTORY OF PULMONARY EMBOLISM: ICD-10-CM

## 2022-08-19 DIAGNOSIS — G62.9 NEUROPATHY: ICD-10-CM

## 2022-08-19 DIAGNOSIS — Z17.0 MALIGNANT NEOPLASM OF UPPER-OUTER QUADRANT OF RIGHT BREAST IN FEMALE, ESTROGEN RECEPTOR POSITIVE: Primary | ICD-10-CM

## 2022-08-19 DIAGNOSIS — I26.99 ACUTE PULMONARY EMBOLISM, UNSPECIFIED PULMONARY EMBOLISM TYPE, UNSPECIFIED WHETHER ACUTE COR PULMONALE PRESENT: ICD-10-CM

## 2022-08-19 DIAGNOSIS — I71.40 ABDOMINAL AORTIC ANEURYSM (AAA) WITHOUT RUPTURE: ICD-10-CM

## 2022-08-19 DIAGNOSIS — R55 SYNCOPE AND COLLAPSE: ICD-10-CM

## 2022-08-19 DIAGNOSIS — R07.9 CHEST PAIN, UNSPECIFIED TYPE: ICD-10-CM

## 2022-08-19 LAB
LEFT ANT TIBIAL SYS PSV: 85 CM/S
LEFT CFA PSV: 124 CM/S
LEFT PERONEAL SYS PSV: 120 CM/S
LEFT POPLITEAL PSV: 95 CM/S
LEFT POST TIBIAL SYS PSV: 112 CM/S
LEFT PROFUNDA SYS PSV: 124 CM/S
LEFT SUPER FEMORAL DIST SYS PSV: 124 CM/S
LEFT SUPER FEMORAL MID SYS PSV: 110 CM/S
LEFT SUPER FEMORAL OSTIAL SYS PSV: 104 CM/S
LEFT SUPER FEMORAL PROX SYS PSV: 145 CM/S
LEFT TIB/PER TRUNK SYS PSV: 118 CM/S
OHS CV LEFT LOWER EXTREMITY ABI (NO CALC): 1.2
OHS CV RIGHT ABI LOWER EXTREMITY (NO CALC): 1.2
RIGHT ANT TIBIAL SYS PSV: 146 CM/S
RIGHT CFA PSV: 165 CM/S
RIGHT PERONEAL SYS PSV: 106 CM/S
RIGHT POPLITEAL PSV: 101 CM/S
RIGHT POST TIBIAL SYS PSV: 121 CM/S
RIGHT PROFUNDA SYS PSV: 104 CM/S
RIGHT SUPER FEMORAL DIST SYS PSV: 163 CM/S
RIGHT SUPER FEMORAL MID SYS PSV: 101 CM/S
RIGHT SUPER FEMORAL OSTIAL SYS PSV: 118 CM/S
RIGHT SUPER FEMORAL PROX SYS PSV: 163 CM/S
RIGHT TIB/PER TRUNK SYS PSV: 85 CM/S

## 2022-08-19 PROCEDURE — 99214 OFFICE O/P EST MOD 30 MIN: CPT | Mod: 95,,, | Performed by: NURSE PRACTITIONER

## 2022-08-19 PROCEDURE — 1157F ADVNC CARE PLAN IN RCRD: CPT | Mod: CPTII,95,, | Performed by: NURSE PRACTITIONER

## 2022-08-19 PROCEDURE — 4010F ACE/ARB THERAPY RXD/TAKEN: CPT | Mod: CPTII,95,, | Performed by: NURSE PRACTITIONER

## 2022-08-19 PROCEDURE — 1159F MED LIST DOCD IN RCRD: CPT | Mod: CPTII,95,, | Performed by: NURSE PRACTITIONER

## 2022-08-19 PROCEDURE — 1160F PR REVIEW ALL MEDS BY PRESCRIBER/CLIN PHARMACIST DOCUMENTED: ICD-10-PCS | Mod: CPTII,95,, | Performed by: NURSE PRACTITIONER

## 2022-08-19 PROCEDURE — 99214 PR OFFICE/OUTPT VISIT, EST, LEVL IV, 30-39 MIN: ICD-10-PCS | Mod: 95,,, | Performed by: NURSE PRACTITIONER

## 2022-08-19 PROCEDURE — 93248 CV CARDIAC MONITOR - 3-15 DAY ADULT (CUPID ONLY): ICD-10-PCS | Mod: ,,, | Performed by: INTERNAL MEDICINE

## 2022-08-19 PROCEDURE — 1159F PR MEDICATION LIST DOCUMENTED IN MEDICAL RECORD: ICD-10-PCS | Mod: CPTII,95,, | Performed by: NURSE PRACTITIONER

## 2022-08-19 PROCEDURE — 1157F PR ADVANCE CARE PLAN OR EQUIV PRESENT IN MEDICAL RECORD: ICD-10-PCS | Mod: CPTII,95,, | Performed by: NURSE PRACTITIONER

## 2022-08-19 PROCEDURE — 93248 EXT ECG>7D<15D REV&INTERPJ: CPT | Mod: ,,, | Performed by: INTERNAL MEDICINE

## 2022-08-19 PROCEDURE — 93925 CV US DOPPLER ARTERIAL LEGS BILATERAL (CUPID ONLY): ICD-10-PCS | Mod: 26,,, | Performed by: INTERNAL MEDICINE

## 2022-08-19 PROCEDURE — 93925 LOWER EXTREMITY STUDY: CPT

## 2022-08-19 PROCEDURE — 1160F RVW MEDS BY RX/DR IN RCRD: CPT | Mod: CPTII,95,, | Performed by: NURSE PRACTITIONER

## 2022-08-19 PROCEDURE — 4010F PR ACE/ARB THEARPY RXD/TAKEN: ICD-10-PCS | Mod: CPTII,95,, | Performed by: NURSE PRACTITIONER

## 2022-08-19 PROCEDURE — 93925 LOWER EXTREMITY STUDY: CPT | Mod: 26,,, | Performed by: INTERNAL MEDICINE

## 2022-08-19 RX ORDER — PREGABALIN 75 MG/1
75 CAPSULE ORAL 3 TIMES DAILY
Qty: 90 CAPSULE | Refills: 2 | Status: SHIPPED | OUTPATIENT
Start: 2022-08-19 | End: 2022-11-11 | Stop reason: SDUPTHER

## 2022-08-19 NOTE — ASSESSMENT & PLAN NOTE
72-year-old female with stage IIA (pT2, pN0(sn), cM0, G2, ER+, NJ-, HER2-) invasive lobular carcinoma of right breast, status post lumpectomy with sentinel lymph node biopsy in 2019.  Currently on Femara and tolerating well.  DEXA scan ordered.  Yet to be obtained.     Unfortunately, imaging studies have shown persistently enlarged right subpectoral lymph node as well as FDG avid left posterior cervical and supraclavicular lymph nodes.  Multiple biopsies in the past have been nondiagnostic for malignancy.  Case was presented at multi-disciplinary tumor conference with recommendation to continue Arimidex.  Patient was not satisfied with recommendation a requested for 2nd opinion.     She was evaluated by Dr. Sutton in [place] who recommended MRI of breast.     MRI was performed on 02/23/2021 and showed lymph node mass deep to the right pectoralis minor muscle measuring 6.6 x 4.7 x 2.9 cm. There were also adjacent satellite lymph nodes anteriorly to the dominant lymph node measured 1.1 x 0.8 cm and 0.6 x 0.4 cm.  Also described was a mass effect upon the right subclavian vein.  The mass does not in case or envelope the subclavian neurovascular structures.     Given the above findings, recommendation was made to excise the mass if not for diagnostic purposes for pain management.  She underwent lymph node excision on 03/01/2021, pathology returned back as lymph node with follicular hyperplasia. No evidence of metastatic carcinoma or lymphoma.     She was continued on Femara with plan for short interval PET scan.     Patient had a PET scan on 4/14/2021 that showed interim right axillary lymph node excision with large residual hypermetabolic right subpectoral lymph node mass.  Also noted interval development of subcentimeter mildly FDG avid left posterior cervical and supraclavicular lymph nodes. No FDG avid mediastinal or hilar nodes.No FDG avid pulmonary nodules/masses.      Had diagnostic bilateral mammography on  9/24/2021, results showed a left breast subcentimeter mass at the 9 o'clock position which is new when compared to mammography from 2019.  Will plan a short-interval mammogram in 6 months to re-assess left breast mass.There was no mammographic evidence of disease in the right breast.     Saw Dr. Sutton in [place] who recommended a repeat PET-CT scan. Results from PET-CT scan showed interval enlargement of subpectoral mass on the right with sustained highly FDG avidity.     Me was she continues to complain of shoulder pain and breast discomfort.  Most recently CT neck chest with contrast was obtained on 03/17/2022, when compared to PET scan from October of 2021, the right subpectoral lymph node has not changed and still measures about 6 cm maximally.     Given continuing discomfort and pain associated with these enlarged lymph nodes.  She was referred to surgical oncologist following extensive conversation, it appears the risk of repeat surgical resection outweighs the benefits.  Decision was made to optimize neuropathic pain management.     Lyrica was initiated for neuropathic pain control. Meanwhile I recommend continued Femara and pain management.

## 2022-08-30 ENCOUNTER — PATIENT MESSAGE (OUTPATIENT)
Dept: CARDIOLOGY | Facility: CLINIC | Age: 72
End: 2022-08-30
Payer: MEDICARE

## 2022-09-01 ENCOUNTER — OFFICE VISIT (OUTPATIENT)
Dept: OBSTETRICS AND GYNECOLOGY | Facility: CLINIC | Age: 72
End: 2022-09-01
Payer: MEDICARE

## 2022-09-01 VITALS
SYSTOLIC BLOOD PRESSURE: 165 MMHG | DIASTOLIC BLOOD PRESSURE: 97 MMHG | BODY MASS INDEX: 43.4 KG/M2 | WEIGHT: 293 LBS | HEIGHT: 69 IN

## 2022-09-01 DIAGNOSIS — N95.1 HOT FLASHES DUE TO MENOPAUSE: ICD-10-CM

## 2022-09-01 DIAGNOSIS — Z01.419 ROUTINE GYNECOLOGICAL EXAMINATION: Primary | ICD-10-CM

## 2022-09-01 DIAGNOSIS — M54.2 NECK PAIN: ICD-10-CM

## 2022-09-01 DIAGNOSIS — M54.50 LUMBAR PAIN: ICD-10-CM

## 2022-09-01 DIAGNOSIS — F32.1 MAJOR DEPRESSIVE DISORDER, SINGLE EPISODE, MODERATE WITH ANXIOUS DISTRESS: ICD-10-CM

## 2022-09-01 PROCEDURE — 1157F ADVNC CARE PLAN IN RCRD: CPT | Mod: CPTII,S$GLB,, | Performed by: NURSE PRACTITIONER

## 2022-09-01 PROCEDURE — 1100F PTFALLS ASSESS-DOCD GE2>/YR: CPT | Mod: CPTII,S$GLB,, | Performed by: NURSE PRACTITIONER

## 2022-09-01 PROCEDURE — 1126F PR PAIN SEVERITY QUANTIFIED, NO PAIN PRESENT: ICD-10-PCS | Mod: CPTII,S$GLB,, | Performed by: NURSE PRACTITIONER

## 2022-09-01 PROCEDURE — 3077F PR MOST RECENT SYSTOLIC BLOOD PRESSURE >= 140 MM HG: ICD-10-PCS | Mod: CPTII,S$GLB,, | Performed by: NURSE PRACTITIONER

## 2022-09-01 PROCEDURE — 1160F RVW MEDS BY RX/DR IN RCRD: CPT | Mod: CPTII,S$GLB,, | Performed by: NURSE PRACTITIONER

## 2022-09-01 PROCEDURE — 1160F PR REVIEW ALL MEDS BY PRESCRIBER/CLIN PHARMACIST DOCUMENTED: ICD-10-PCS | Mod: CPTII,S$GLB,, | Performed by: NURSE PRACTITIONER

## 2022-09-01 PROCEDURE — 3288F FALL RISK ASSESSMENT DOCD: CPT | Mod: CPTII,S$GLB,, | Performed by: NURSE PRACTITIONER

## 2022-09-01 PROCEDURE — 4010F ACE/ARB THERAPY RXD/TAKEN: CPT | Mod: CPTII,S$GLB,, | Performed by: NURSE PRACTITIONER

## 2022-09-01 PROCEDURE — 1159F PR MEDICATION LIST DOCUMENTED IN MEDICAL RECORD: ICD-10-PCS | Mod: CPTII,S$GLB,, | Performed by: NURSE PRACTITIONER

## 2022-09-01 PROCEDURE — 3080F DIAST BP >= 90 MM HG: CPT | Mod: CPTII,S$GLB,, | Performed by: NURSE PRACTITIONER

## 2022-09-01 PROCEDURE — 3008F BODY MASS INDEX DOCD: CPT | Mod: CPTII,S$GLB,, | Performed by: NURSE PRACTITIONER

## 2022-09-01 PROCEDURE — 3080F PR MOST RECENT DIASTOLIC BLOOD PRESSURE >= 90 MM HG: ICD-10-PCS | Mod: CPTII,S$GLB,, | Performed by: NURSE PRACTITIONER

## 2022-09-01 PROCEDURE — 4010F PR ACE/ARB THEARPY RXD/TAKEN: ICD-10-PCS | Mod: CPTII,S$GLB,, | Performed by: NURSE PRACTITIONER

## 2022-09-01 PROCEDURE — G0101 CA SCREEN;PELVIC/BREAST EXAM: HCPCS | Mod: S$GLB,,, | Performed by: NURSE PRACTITIONER

## 2022-09-01 PROCEDURE — 99999 PR PBB SHADOW E&M-EST. PATIENT-LVL III: ICD-10-PCS | Mod: PBBFAC,,, | Performed by: NURSE PRACTITIONER

## 2022-09-01 PROCEDURE — 1126F AMNT PAIN NOTED NONE PRSNT: CPT | Mod: CPTII,S$GLB,, | Performed by: NURSE PRACTITIONER

## 2022-09-01 PROCEDURE — 99999 PR PBB SHADOW E&M-EST. PATIENT-LVL III: CPT | Mod: PBBFAC,,, | Performed by: NURSE PRACTITIONER

## 2022-09-01 PROCEDURE — 3288F PR FALLS RISK ASSESSMENT DOCUMENTED: ICD-10-PCS | Mod: CPTII,S$GLB,, | Performed by: NURSE PRACTITIONER

## 2022-09-01 PROCEDURE — 1157F PR ADVANCE CARE PLAN OR EQUIV PRESENT IN MEDICAL RECORD: ICD-10-PCS | Mod: CPTII,S$GLB,, | Performed by: NURSE PRACTITIONER

## 2022-09-01 PROCEDURE — G0101 PR CA SCREEN;PELVIC/BREAST EXAM: ICD-10-PCS | Mod: S$GLB,,, | Performed by: NURSE PRACTITIONER

## 2022-09-01 PROCEDURE — 1159F MED LIST DOCD IN RCRD: CPT | Mod: CPTII,S$GLB,, | Performed by: NURSE PRACTITIONER

## 2022-09-01 PROCEDURE — 3077F SYST BP >= 140 MM HG: CPT | Mod: CPTII,S$GLB,, | Performed by: NURSE PRACTITIONER

## 2022-09-01 PROCEDURE — 3008F PR BODY MASS INDEX (BMI) DOCUMENTED: ICD-10-PCS | Mod: CPTII,S$GLB,, | Performed by: NURSE PRACTITIONER

## 2022-09-01 PROCEDURE — 1100F PR PT FALLS ASSESS DOC 2+ FALLS/FALL W/INJURY/YR: ICD-10-PCS | Mod: CPTII,S$GLB,, | Performed by: NURSE PRACTITIONER

## 2022-09-01 RX ORDER — VENLAFAXINE HYDROCHLORIDE 37.5 MG/1
37.5 CAPSULE, EXTENDED RELEASE ORAL 2 TIMES DAILY
Qty: 180 CAPSULE | Refills: 0 | Status: SHIPPED | OUTPATIENT
Start: 2022-09-01 | End: 2023-01-13

## 2022-09-01 RX ORDER — VENLAFAXINE HYDROCHLORIDE 37.5 MG/1
37.5 CAPSULE, EXTENDED RELEASE ORAL 2 TIMES DAILY
Qty: 60 CAPSULE | Refills: 0 | Status: SHIPPED | OUTPATIENT
Start: 2022-09-01 | End: 2022-09-01 | Stop reason: SDUPTHER

## 2022-09-01 NOTE — TELEPHONE ENCOUNTER
No new care gaps identified.  [facility] Embedded Care Gaps. Reference number: [id number]. 9/01/2022   4:59:43 PM CDT

## 2022-09-01 NOTE — PROGRESS NOTES
Subjective:       Patient ID: Susu Luther is a 72 y.o. female.    Chief Complaint:  Well Woman and Hot Flashes    No LMP recorded (lmp unknown). Patient is postmenopausal.  History of Present Illness  Presents today for WWE   Complains of hot flashes, however she is not eligible for hormonal therapy due to medical history     OB History    Para Term  AB Living   2 2 2     2   SAB IAB Ectopic Multiple Live Births           2      # Outcome Date GA Lbr Dominic/2nd Weight Sex Delivery Anes PTL Lv   2 Term      CS-Unspec      1 Term      Vag-Spont          Review of Systems  Review of Systems   Genitourinary:         Hot flashes          Objective:    Physical Exam  Constitutional:       Appearance: Normal appearance.   Skin:     General: Skin is warm and dry.   Neurological:      Mental Status: She is alert.   Psychiatric:         Mood and Affect: Mood normal.         Behavior: Behavior normal.         Thought Content: Thought content normal.         Judgment: Judgment normal.         Assessment:     1. Routine gynecological examination    2. Hot flashes due to menopause              Plan:   Susu was seen today for well woman and hot flashes.    Diagnoses and all orders for this visit:    Routine gynecological examination    Hot flashes due to menopause  -     Discontinue: venlafaxine (EFFEXOR-XR) 37.5 MG 24 hr capsule; Take 1 capsule (37.5 mg total) by mouth 2 (two) times a day.  -     venlafaxine (EFFEXOR-XR) 37.5 MG 24 hr capsule; Take 1 capsule (37.5 mg total) by mouth 2 (two) times a day.    Return to clinic in 3 months for f/u

## 2022-09-01 NOTE — TELEPHONE ENCOUNTER
----- Message from Fay Johnston sent at 9/1/2022 10:27 AM CDT -----  Contact: Susan with [facility]  Susan with [facility] would like to consult with a nurse in regards to a prescription request. Please call back at [phone]. Thanks r/s

## 2022-09-02 RX ORDER — DULOXETIN HYDROCHLORIDE 30 MG/1
30 CAPSULE, DELAYED RELEASE ORAL DAILY
Qty: 90 CAPSULE | Refills: 0 | Status: SHIPPED | OUTPATIENT
Start: 2022-09-02 | End: 2022-11-13

## 2022-09-02 RX ORDER — HYDROXYZINE HYDROCHLORIDE 25 MG/1
25 TABLET, FILM COATED ORAL NIGHTLY
Qty: 90 TABLET | Refills: 0 | Status: SHIPPED | OUTPATIENT
Start: 2022-09-02 | End: 2022-11-13

## 2022-09-02 RX ORDER — TIZANIDINE 4 MG/1
4 TABLET ORAL EVERY 8 HOURS PRN
Qty: 90 TABLET | Refills: 0 | Status: SHIPPED | OUTPATIENT
Start: 2022-09-02 | End: 2022-09-27

## 2022-09-03 DIAGNOSIS — G89.3 NEOPLASM RELATED PAIN: ICD-10-CM

## 2022-09-05 RX ORDER — OXYCODONE AND ACETAMINOPHEN 7.5; 325 MG/1; MG/1
1 TABLET ORAL EVERY 6 HOURS PRN
Qty: 60 TABLET | Refills: 0 | Status: SHIPPED | OUTPATIENT
Start: 2022-09-05 | End: 2022-11-11 | Stop reason: SDUPTHER

## 2022-09-07 ENCOUNTER — LAB VISIT (OUTPATIENT)
Dept: LAB | Facility: HOSPITAL | Age: 72
End: 2022-09-07
Attending: STUDENT IN AN ORGANIZED HEALTH CARE EDUCATION/TRAINING PROGRAM
Payer: MEDICARE

## 2022-09-07 ENCOUNTER — OFFICE VISIT (OUTPATIENT)
Dept: CARDIOLOGY | Facility: CLINIC | Age: 72
End: 2022-09-07
Payer: MEDICARE

## 2022-09-07 VITALS
HEART RATE: 86 BPM | BODY MASS INDEX: 39.68 KG/M2 | WEIGHT: 293 LBS | SYSTOLIC BLOOD PRESSURE: 160 MMHG | OXYGEN SATURATION: 98 % | DIASTOLIC BLOOD PRESSURE: 94 MMHG | HEIGHT: 72 IN

## 2022-09-07 DIAGNOSIS — R55 SYNCOPE AND COLLAPSE: ICD-10-CM

## 2022-09-07 DIAGNOSIS — M79.604 RIGHT LEG PAIN: ICD-10-CM

## 2022-09-07 DIAGNOSIS — I26.99 RECURRENT PULMONARY EMBOLI: ICD-10-CM

## 2022-09-07 DIAGNOSIS — E78.5 HYPERLIPIDEMIA, UNSPECIFIED HYPERLIPIDEMIA TYPE: ICD-10-CM

## 2022-09-07 DIAGNOSIS — C50.411 MALIGNANT NEOPLASM OF UPPER-OUTER QUADRANT OF RIGHT BREAST IN FEMALE, ESTROGEN RECEPTOR POSITIVE: ICD-10-CM

## 2022-09-07 DIAGNOSIS — I50.32 CHRONIC DIASTOLIC CONGESTIVE HEART FAILURE: ICD-10-CM

## 2022-09-07 DIAGNOSIS — R60.9 EDEMA, UNSPECIFIED TYPE: ICD-10-CM

## 2022-09-07 DIAGNOSIS — M79.89 LEFT LEG SWELLING: ICD-10-CM

## 2022-09-07 DIAGNOSIS — R06.02 SOB (SHORTNESS OF BREATH): ICD-10-CM

## 2022-09-07 DIAGNOSIS — I71.40 ABDOMINAL AORTIC ANEURYSM (AAA) WITHOUT RUPTURE: ICD-10-CM

## 2022-09-07 DIAGNOSIS — I10 PRIMARY HYPERTENSION: ICD-10-CM

## 2022-09-07 DIAGNOSIS — Z86.73 HISTORY OF CVA (CEREBROVASCULAR ACCIDENT): ICD-10-CM

## 2022-09-07 DIAGNOSIS — Z17.0 MALIGNANT NEOPLASM OF UPPER-OUTER QUADRANT OF RIGHT BREAST IN FEMALE, ESTROGEN RECEPTOR POSITIVE: ICD-10-CM

## 2022-09-07 DIAGNOSIS — R07.9 CHEST PAIN, UNSPECIFIED TYPE: Primary | ICD-10-CM

## 2022-09-07 DIAGNOSIS — E66.01 MORBID OBESITY WITH BMI OF 45.0-49.9, ADULT: ICD-10-CM

## 2022-09-07 LAB
BNP SERPL-MCNC: 180 PG/ML (ref 0–99)
URATE SERPL-MCNC: 7.2 MG/DL (ref 2.4–5.7)

## 2022-09-07 PROCEDURE — 4010F PR ACE/ARB THEARPY RXD/TAKEN: ICD-10-PCS | Mod: CPTII,S$GLB,, | Performed by: STUDENT IN AN ORGANIZED HEALTH CARE EDUCATION/TRAINING PROGRAM

## 2022-09-07 PROCEDURE — 99999 PR PBB SHADOW E&M-EST. PATIENT-LVL IV: CPT | Mod: PBBFAC,,, | Performed by: STUDENT IN AN ORGANIZED HEALTH CARE EDUCATION/TRAINING PROGRAM

## 2022-09-07 PROCEDURE — 3080F DIAST BP >= 90 MM HG: CPT | Mod: CPTII,S$GLB,, | Performed by: STUDENT IN AN ORGANIZED HEALTH CARE EDUCATION/TRAINING PROGRAM

## 2022-09-07 PROCEDURE — 1125F AMNT PAIN NOTED PAIN PRSNT: CPT | Mod: CPTII,S$GLB,, | Performed by: STUDENT IN AN ORGANIZED HEALTH CARE EDUCATION/TRAINING PROGRAM

## 2022-09-07 PROCEDURE — 36415 COLL VENOUS BLD VENIPUNCTURE: CPT | Performed by: STUDENT IN AN ORGANIZED HEALTH CARE EDUCATION/TRAINING PROGRAM

## 2022-09-07 PROCEDURE — 4010F ACE/ARB THERAPY RXD/TAKEN: CPT | Mod: CPTII,S$GLB,, | Performed by: STUDENT IN AN ORGANIZED HEALTH CARE EDUCATION/TRAINING PROGRAM

## 2022-09-07 PROCEDURE — 1159F MED LIST DOCD IN RCRD: CPT | Mod: CPTII,S$GLB,, | Performed by: STUDENT IN AN ORGANIZED HEALTH CARE EDUCATION/TRAINING PROGRAM

## 2022-09-07 PROCEDURE — 3288F PR FALLS RISK ASSESSMENT DOCUMENTED: ICD-10-PCS | Mod: CPTII,S$GLB,, | Performed by: STUDENT IN AN ORGANIZED HEALTH CARE EDUCATION/TRAINING PROGRAM

## 2022-09-07 PROCEDURE — 1125F PR PAIN SEVERITY QUANTIFIED, PAIN PRESENT: ICD-10-PCS | Mod: CPTII,S$GLB,, | Performed by: STUDENT IN AN ORGANIZED HEALTH CARE EDUCATION/TRAINING PROGRAM

## 2022-09-07 PROCEDURE — 1159F PR MEDICATION LIST DOCUMENTED IN MEDICAL RECORD: ICD-10-PCS | Mod: CPTII,S$GLB,, | Performed by: STUDENT IN AN ORGANIZED HEALTH CARE EDUCATION/TRAINING PROGRAM

## 2022-09-07 PROCEDURE — 3077F SYST BP >= 140 MM HG: CPT | Mod: CPTII,S$GLB,, | Performed by: STUDENT IN AN ORGANIZED HEALTH CARE EDUCATION/TRAINING PROGRAM

## 2022-09-07 PROCEDURE — 83880 ASSAY OF NATRIURETIC PEPTIDE: CPT | Performed by: STUDENT IN AN ORGANIZED HEALTH CARE EDUCATION/TRAINING PROGRAM

## 2022-09-07 PROCEDURE — 3288F FALL RISK ASSESSMENT DOCD: CPT | Mod: CPTII,S$GLB,, | Performed by: STUDENT IN AN ORGANIZED HEALTH CARE EDUCATION/TRAINING PROGRAM

## 2022-09-07 PROCEDURE — 99214 OFFICE O/P EST MOD 30 MIN: CPT | Mod: S$GLB,,, | Performed by: STUDENT IN AN ORGANIZED HEALTH CARE EDUCATION/TRAINING PROGRAM

## 2022-09-07 PROCEDURE — 1157F ADVNC CARE PLAN IN RCRD: CPT | Mod: CPTII,S$GLB,, | Performed by: STUDENT IN AN ORGANIZED HEALTH CARE EDUCATION/TRAINING PROGRAM

## 2022-09-07 PROCEDURE — 1100F PR PT FALLS ASSESS DOC 2+ FALLS/FALL W/INJURY/YR: ICD-10-PCS | Mod: CPTII,S$GLB,, | Performed by: STUDENT IN AN ORGANIZED HEALTH CARE EDUCATION/TRAINING PROGRAM

## 2022-09-07 PROCEDURE — 1100F PTFALLS ASSESS-DOCD GE2>/YR: CPT | Mod: CPTII,S$GLB,, | Performed by: STUDENT IN AN ORGANIZED HEALTH CARE EDUCATION/TRAINING PROGRAM

## 2022-09-07 PROCEDURE — 99999 PR PBB SHADOW E&M-EST. PATIENT-LVL IV: ICD-10-PCS | Mod: PBBFAC,,, | Performed by: STUDENT IN AN ORGANIZED HEALTH CARE EDUCATION/TRAINING PROGRAM

## 2022-09-07 PROCEDURE — 1157F PR ADVANCE CARE PLAN OR EQUIV PRESENT IN MEDICAL RECORD: ICD-10-PCS | Mod: CPTII,S$GLB,, | Performed by: STUDENT IN AN ORGANIZED HEALTH CARE EDUCATION/TRAINING PROGRAM

## 2022-09-07 PROCEDURE — 84550 ASSAY OF BLOOD/URIC ACID: CPT | Performed by: STUDENT IN AN ORGANIZED HEALTH CARE EDUCATION/TRAINING PROGRAM

## 2022-09-07 PROCEDURE — 3080F PR MOST RECENT DIASTOLIC BLOOD PRESSURE >= 90 MM HG: ICD-10-PCS | Mod: CPTII,S$GLB,, | Performed by: STUDENT IN AN ORGANIZED HEALTH CARE EDUCATION/TRAINING PROGRAM

## 2022-09-07 PROCEDURE — 3077F PR MOST RECENT SYSTOLIC BLOOD PRESSURE >= 140 MM HG: ICD-10-PCS | Mod: CPTII,S$GLB,, | Performed by: STUDENT IN AN ORGANIZED HEALTH CARE EDUCATION/TRAINING PROGRAM

## 2022-09-07 PROCEDURE — 3008F BODY MASS INDEX DOCD: CPT | Mod: CPTII,S$GLB,, | Performed by: STUDENT IN AN ORGANIZED HEALTH CARE EDUCATION/TRAINING PROGRAM

## 2022-09-07 PROCEDURE — 99214 PR OFFICE/OUTPT VISIT, EST, LEVL IV, 30-39 MIN: ICD-10-PCS | Mod: S$GLB,,, | Performed by: STUDENT IN AN ORGANIZED HEALTH CARE EDUCATION/TRAINING PROGRAM

## 2022-09-07 PROCEDURE — 3008F PR BODY MASS INDEX (BMI) DOCUMENTED: ICD-10-PCS | Mod: CPTII,S$GLB,, | Performed by: STUDENT IN AN ORGANIZED HEALTH CARE EDUCATION/TRAINING PROGRAM

## 2022-09-07 RX ORDER — CARVEDILOL 25 MG/1
25 TABLET ORAL 2 TIMES DAILY WITH MEALS
Qty: 30 TABLET | Refills: 11 | Status: SHIPPED | OUTPATIENT
Start: 2022-09-07 | End: 2023-01-11 | Stop reason: SDUPTHER

## 2022-09-07 NOTE — PROGRESS NOTES
Subjective:   Patient ID:  Susu Luther is a 72 y.o. female who presents for follow up of No chief complaint on file.    12/1/20  69 yo female, care establish. Prior cardiologist Dr ackerman   [facility] HTN, CVA (2009), Right breast CA, Lumpectomy 3/2019, h/o PE off OAC 2 yrs ago.  AAA, s/p transcutaneous patch by Dr. Bonds, obesity knee OA imbalanced walker dependent  C/o SOB after walking few steps and dizziness. Had vision issue 1 month ago due to uncontrolled HTN  No chest pain  Sleeps with 2 pillows  Decent appetites  Leg calf pain worse at night  No smoking/drinking  ekg today NSR LVH.    ECH normal EF, grade II DD, LAE and PAP 56 mmHG   Chest CTA negative for PE  BP high    A1c controlled    S/p Open subpectoral lymph node biopsy on the right on 12/02/2020 by Dr. Starks  Still right chest, under arm and shoudler supersensitive pain      Shortness of Breath  Associated symptoms include chest pain and leg swelling. Pertinent negatives include no abdominal pain, claudication, fever, headaches, neck pain, orthopnea, PND, rash, sputum production, syncope, vomiting or wheezing.   Chest Pain   Associated symptoms include shortness of breath. Pertinent negatives include no abdominal pain, back pain, claudication, cough, diaphoresis, dizziness, fever, headaches, irregular heartbeat, malaise/fatigue, nausea, near-syncope, numbness, orthopnea, palpitations, PND, sputum production, syncope, vomiting or weakness.   Her past medical history is significant for CHF.   Pertinent negatives for past medical history include no seizures.   Congestive Heart Failure  Associated symptoms include chest pain and shortness of breath. Pertinent negatives include no abdominal pain, claudication, near-syncope or palpitations.   2/28/22  Patient was admitted to [facility] for shortness of breath.  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  Started on heparin drip in  transition to oral anticoagulation, now on Eliquis.  Recurrent PE.  On Femara. Has another lump in breast on right side, recently seen on PET scan.   Due to TANNER, was told to stop hctz, telmisartan.  She has ran out of clonidine. Does not take the ASA, hydralazine, amlodipine.   Blood pressure normotensive.  Reports severe headaches.  Has been out of Fioricet.  Echocardiogram with normal EF  Reports shortness of breath, chest heaviness mostly right-sided.      3/14/22  Still having SOB due to PE.  No bleeding issues while on eliquis.  Chest pain is substernal to right sided.   Lasix doesn't seem to help.   A reports intermittent leg pain right > left.  DVT ultrasound in-hospital with no evidence of DVT.  Following Hematology-Oncology.  Patient does not want surgery if needed for possible breast cancer.  Denies syncope, fever, chills.         4/18/22  Comes in with daughter who lives in [place].  Concerned that she may oxygen issues and may need home oxygen. O2 98%  Reports LE swelling and SOB  Reports chest pain comes on with SOB, did not have chest pain prior to PE  Has not been on lasix, has been held  Reports balancing issues- can do PT once symptoms improve   Denies syncope, fever, chills.       5/16/22  Has been feeling weak last couple days  Feels chest tightness with walking, also CURIEL- feels worse than before. 97% O2 in clinic   Reports dizziness after taking medications   BP low today   Says recurrent cancer may be spreading   No bleeding on eliquis   Reports orthopnea, PND.  Not feeling well- Does not want to the hospital     Denies syncope.      6/13/22  Not feeling well today  Reports chest pain and SOB worsening over the last 2 weeks   Ddimer trending, downTroponin neg and BNP nl on 5/16/22  EKG today without significant abnormalities   Reports recent diagnosis of breast cancer is progressing  Reports right-sided arm weakness  Patient has been increasingly stressed    Denies syncope, lower extremity  swelling.      7/6/2022   went to emergency room 6/13/2022, was worked up for stroke  MRI brain negative  Repeat CTA chest without PE, right-sided mass 6 cm, stable  All blood pressure medications.  Due to hypotension  Started taking Lasix today  Has significant lower extremity pain bilateral, reports blue feet at times      8/9/22  Virtual visit  Ambulates with walker   Had a fall recently due to syncopal episode, went to urgent care, negative workup per patient  Syncopal event occurred while standing up  Last visit Lasix was increased, patient reports increased thirst and water intake  Has also been taking carvedilol, reports low blood pressure  Chest pain worsened after syncopal event, has bruising on her body      9/7/22  Reports leg swelling, left  Has been taking eliquis also   Restarted taking lasix 2 weeks ago  Recently started on lyrica   Still wearing cardiac monitor  Still having SOB and chest discomfort  U/S arterial w/o significant stenosis   BP elevated, high at home  Lost 8 lbs since 9/1      EKG 6/13/22 NSR, LAD, LVH, 93 bpm, qtc 455 ms  EKG 5/16/22 SB, incomplete RBBB, LVH, septal infarct, qtc 422 ms   Echo 2/28/22  The left ventricle is normal in size with concentric hypertrophy and normal systolic function.  The estimated ejection fraction is 60%.  Normal left ventricular diastolic function.  Normal right ventricular size with normal right ventricular systolic function.  Mild to moderate tricuspid regurgitation.  Normal central venous pressure (3 mmHg).  The estimated PA systolic pressure is 36 mmHg.       Echo 2019  CONCLUSIONS     1 - Mild left atrial enlargement.     2 - Concentric hypertrophy.     3 - No wall motion abnormalities.     4 - Normal left ventricular systolic function (EF 60-65%).     5 - Impaired LV relaxation, elevated LAP (grade 2 diastolic dysfunction).     6 - Normal right ventricular systolic function .     7 - The estimated PA systolic pressure is 36 mmHg.     8 - Mild  tricuspid regurgitation.        Past Medical History:   Diagnosis Date    Chronic diastolic congestive heart failure 2020    Encounter for blood transfusion     History of repair of aneurysm of abdominal aorta using endovascular stent graft     DR Bonds    Hx of psychiatric care     Hypertension     Major depressive disorder, single episode, moderate with anxious distress 2020    Malignant neoplasm of upper-outer quadrant of right breast in female, estrogen receptor positive 3/14/2019    radiation    Psychiatric problem     Sleep apnea     Sleep difficulties     Stroke 2009    no residual defect    Therapy        Past Surgical History:   Procedure Laterality Date    APPENDECTOMY      AXILLARY NODE DISSECTION Right 2020    Procedure: LYMPHADENECTOMY, AXILLARY;  Surgeon: Artemio Starks MD;  Location: [facility];  Service: General;  Laterality: Right;    BIOPSY OF AXILLARY NODE Right 2021    Procedure: BIOPSY, LYMPH NODE, AXILLARY;  Surgeon: Artemio Sutton MD;  Location: [address];  Service: General;  Laterality: Right;    BREAST BIOPSY          BREAST LUMPECTOMY      2019     SECTION      x 1    OOPHORECTOMY      right     SENTINEL LYMPH NODE BIOPSY Right 2019    Procedure: BIOPSY, LYMPH NODE, SENTINEL;  Surgeon: Artemio Starks MD;  Location: [facility];  Service: General;  Laterality: Right;    splenic artery aneurysm repair      TONSILLECTOMY         Social History     Tobacco Use    Smoking status: Never    Smokeless tobacco: Never   Substance Use Topics    Alcohol use: No    Drug use: No       Family History   Problem Relation Age of Onset    Diabetes Maternal Grandmother     Diabetes Maternal Grandfather     Diabetes Mother     Hypertension Mother     Sickle cell anemia Daughter     Breast cancer Neg Hx     Colon cancer Neg Hx     Ovarian cancer Neg Hx          Review of Systems   Constitutional: Negative for decreased appetite, diaphoresis, fever,  "malaise/fatigue and night sweats.   HENT:  Negative for nosebleeds.    Eyes:  Negative for blurred vision and double vision.   Cardiovascular:  Positive for chest pain, dyspnea on exertion and leg swelling. Negative for claudication, irregular heartbeat, near-syncope, orthopnea, palpitations, paroxysmal nocturnal dyspnea and syncope.   Respiratory:  Positive for shortness of breath. Negative for cough, sleep disturbances due to breathing, snoring, sputum production and wheezing.    Endocrine: Negative for cold intolerance and polyuria.   Hematologic/Lymphatic: Does not bruise/bleed easily.   Skin:  Negative for rash.   Musculoskeletal:  Negative for back pain, falls, joint pain, joint swelling and neck pain.        Right chest breast and shoulder pain   Gastrointestinal:  Negative for abdominal pain, heartburn, nausea and vomiting.   Genitourinary:  Negative for dysuria, frequency and hematuria.   Neurological:  Negative for difficulty with concentration, dizziness, focal weakness, headaches, light-headedness, numbness, seizures and weakness.   Psychiatric/Behavioral:  Negative for depression, memory loss and substance abuse. The patient does not have insomnia.    Allergic/Immunologic: Negative for HIV exposure and hives.   Vitals:    09/07/22 1316   BP: (!) 160/94   BP Location: Left arm   Patient Position: Sitting   BP Method: Large (Manual)   Pulse: 86   SpO2: 98%   Weight: (!) 144.3 kg (318 lb 2 oz)   Height: 6' 1" (1.854 m)         Objective:   Physical Exam  Constitutional:       Comments: Mild distress.    HENT:      Head: Normocephalic.   Eyes:      Pupils: Pupils are equal, round, and reactive to light.   Neck:      Thyroid: No thyromegaly.      Vascular: Normal carotid pulses. No carotid bruit or JVD.   Cardiovascular:      Rate and Rhythm: Normal rate and regular rhythm. No extrasystoles are present.     Chest Wall: PMI is not displaced.      Pulses: Normal pulses.      Heart sounds: Normal heart sounds. " No murmur heard.    No gallop. No S3 sounds.   Pulmonary:      Effort: No respiratory distress.      Breath sounds: Normal breath sounds. No stridor.   Abdominal:      General: Bowel sounds are normal.      Palpations: Abdomen is soft.      Tenderness: There is no abdominal tenderness. There is no rebound.   Musculoskeletal:         General: Normal range of motion.      Left lower leg: Edema present.      Comments: Tender to palpitation   Skin:     Findings: No rash.   Neurological:      Mental Status: She is alert and oriented to person, place, and time.   Psychiatric:         Behavior: Behavior normal.       Lab Results   Component Value Date    CHOL 229 (H) 04/18/2022    CHOL 221 (H) 10/19/2020    CHOL 231 (H) 08/25/2020     Lab Results   Component Value Date    HDL 45 04/18/2022    HDL 55 10/19/2020    HDL 49 08/25/2020     Lab Results   Component Value Date    LDLCALC 150.4 04/18/2022    LDLCALC 137.4 10/19/2020    LDLCALC 135.6 08/25/2020     Lab Results   Component Value Date    TRIG 168 (H) 04/18/2022    TRIG 143 10/19/2020    TRIG 232 (H) 08/25/2020     Lab Results   Component Value Date    CHOLHDL 19.7 (L) 04/18/2022    CHOLHDL 24.9 10/19/2020    CHOLHDL 21.2 08/25/2020       Chemistry        Component Value Date/Time     08/15/2022 1513    K 4.2 08/15/2022 1513     08/15/2022 1513    CO2 21 (L) 08/15/2022 1513    BUN 17 08/15/2022 1513    CREATININE 1.3 08/15/2022 1513     (H) 08/15/2022 1513        Component Value Date/Time    CALCIUM 9.5 08/15/2022 1513    ALKPHOS 115 08/15/2022 1513    AST 14 08/15/2022 1513    ALT 14 08/15/2022 1513    BILITOT 0.2 08/15/2022 1513    ESTGFRAFRICA 37 (A) 07/06/2022 1312    EGFRNONAA 32 (A) 07/06/2022 1312          Lab Results   Component Value Date    HGBA1C 5.9 (H) 05/18/2019     Lab Results   Component Value Date    TSH 1.460 10/18/2021     Lab Results   Component Value Date    INR 0.9 02/15/2022    INR 1.0 11/10/2020    INR 1.0 05/19/2019     Lab  Results   Component Value Date    WBC 7.75 08/15/2022    HGB 10.4 (L) 08/15/2022    HCT 35.0 (L) 08/15/2022    MCV 80 (L) 08/15/2022     08/15/2022     BMP  Sodium   Date Value Ref Range Status   08/15/2022 142 136 - 145 mmol/L Final     Potassium   Date Value Ref Range Status   08/15/2022 4.2 3.5 - 5.1 mmol/L Final     Chloride   Date Value Ref Range Status   08/15/2022 109 95 - 110 mmol/L Final     CO2   Date Value Ref Range Status   08/15/2022 21 (L) 23 - 29 mmol/L Final     BUN   Date Value Ref Range Status   08/15/2022 17 8 - 23 mg/dL Final     Creatinine   Date Value Ref Range Status   08/15/2022 1.3 0.5 - 1.4 mg/dL Final     Calcium   Date Value Ref Range Status   08/15/2022 9.5 8.7 - 10.5 mg/dL Final     Anion Gap   Date Value Ref Range Status   08/15/2022 12 8 - 16 mmol/L Final     eGFR if    Date Value Ref Range Status   07/06/2022 37 (A) >60 mL/min/1.73 m^2 Final     eGFR if non    Date Value Ref Range Status   07/06/2022 32 (A) >60 mL/min/1.73 m^2 Final     Comment:     Calculation used to obtain the estimated glomerular filtration  rate (eGFR) is the CKD-EPI equation.        BNP  @LABRCNTIP(BNP,BNPTRIAGEBLO)@  @LABRCNTIP(troponini)@  CrCl cannot be calculated (Patient's most recent lab result is older than the maximum 7 days allowed.).  No results found in the last 24 hours.  No results found in the last 24 hours.  No results found in the last 24 hours.    Assessment:      1. Chest pain, unspecified type    2. Morbid obesity with BMI of 45.0-49.9, adult    3. Syncope and collapse    4. Abdominal aortic aneurysm (AAA) without rupture    5. Primary hypertension    6. Chronic diastolic congestive heart failure    7. History of CVA (cerebrovascular accident)    8. Hyperlipidemia, unspecified hyperlipidemia type    9. Recurrent pulmonary emboli    10. Malignant neoplasm of upper-outer quadrant of right breast in female, estrogen receptor positive    11. Right leg pain     12. SOB (shortness of breath)            Plan:     Left leg swelling/edema  Continue lasix  Obtain venous U/S  Recently started on lyrica which can cause swelling   Check BnP    Chest pain/shortness of breath  Worsening due to recent fall  Prior troponin, BNP wnl,  D-dimer improving  Repeat CTA PE without evidence of PE, stable right-sided mass    Syncope- resolved   Possibly due to dehydration  Stop Carvedilol  14 day monitor- pending    Hypertension  Reports hypotension  Restarted carvedilol, increase to 25 (and take BID)    History of right breast cancer  Enlarging lymph nodes that could be causing significant pain/discomfort  Started on Norvasc take pain medication  Follows with Hematology-Oncology    Diastolic heart failure  Echo with normal EF, mild-mod TR  BNP wnl    Right leg pain  DVT ultrasound recently without evidence of DVT   Normal ABIs  Arterial ultrasound 8/22 without significant stenosis    Recurrent pulmonary embolus   Recurrent PE as of 2/22  PCP to evaluate regarding home O2  Continue OAC    History of CVA  Carotid ultrasound no significant stenosis, recent MRI brain no abnormality  Currently not on aspirin as she is taking Eliquis  Continue statin    HLD   as of 4/22  Continue statin    AAA s/p transcutaneous patch  Stable    Morbid obesity, BMI > 40  Low-salt, low-fat diet  Exercise as tolerated      All labs and cardiac procedures reviewed.      Return to clinic in 2 weeks    Arlene Hobbs MD  Cardiology Staff

## 2022-09-08 ENCOUNTER — TELEPHONE (OUTPATIENT)
Dept: CARDIOLOGY | Facility: CLINIC | Age: 72
End: 2022-09-08
Payer: MEDICARE

## 2022-09-08 NOTE — PROGRESS NOTES
Call patient  BNP level is up and may be having symptoms due to fluid build up  Please confirm the dose and frequency of her lasix medication and let me know  Uric acid level is elevated, reports significant toe pain (could have possible new onset gout attack)- not taking HCTZ  Will alert Dr. Arias for further recommendations

## 2022-09-08 NOTE — TELEPHONE ENCOUNTER
The patient stated she takes Lasix is twice a day. The patient stated she would wait for the primary care to send a RX  for the Gout.     Patient contacted and was advised that her   BNP level is up and may be having symptoms due to fluid build up  Please confirm the dose and frequency of her lasix medication and let me know  Uric acid level is elevated, reports significant toe pain (could have possible new onset gout attack)- not taking HCTZ  Will alert Dr. Arias for further recommendations

## 2022-09-09 DIAGNOSIS — M10.372 GOUT OF LEFT FOOT DUE TO RENAL IMPAIRMENT, UNSPECIFIED CHRONICITY: Primary | ICD-10-CM

## 2022-09-09 DIAGNOSIS — I10 UNCONTROLLED HYPERTENSION: ICD-10-CM

## 2022-09-09 RX ORDER — PREDNISONE 20 MG/1
TABLET ORAL
Qty: 10 TABLET | Refills: 0 | Status: SHIPPED | OUTPATIENT
Start: 2022-09-09 | End: 2022-10-11 | Stop reason: SDUPTHER

## 2022-09-09 RX ORDER — FUROSEMIDE 20 MG/1
TABLET ORAL
Qty: 90 TABLET | Refills: 3 | Status: SHIPPED | OUTPATIENT
Start: 2022-09-09 | End: 2023-05-17 | Stop reason: SDUPTHER

## 2022-09-09 NOTE — TELEPHONE ENCOUNTER
Previous message:    The patient stated she takes Lasix is twice a day. The patient stated she would wait for the primary care to send a RX  for the Gout.      Patient contacted and was advised that her   BNP level is up and may be having symptoms due to fluid build up  Please confirm the dose and frequency of her lasix medication and let me know  Uric acid level is elevated, reports significant toe pain (could have possible new onset gout attack)- not taking HCTZ  Will alert Dr. Arias for further recommendations      Patient contacted and was advised to take an extra dose of lasix (can take with her AM dose) until her symptoms improve.    The patient stated understanding with no questions or concerns.

## 2022-09-14 NOTE — PROGRESS NOTES
Subjective:       Patient ID: Susu Luther is a 72 y.o. female.    Chief Complaint:   1. Malignant neoplasm of upper-outer quadrant of right breast in female, estrogen receptor positive  Stage IIA (pT2, pN0(sn), cM0, G2, ER+, [place]-, HER2-) invasive lobular carcinoma of right breast      2. Acute pulmonary embolism, unspecified pulmonary embolism type, unspecified whether acute cor pulmonale present          Current Treatment:  Femara 2.5mg po daily     Treatment History:  S/p lumpectomy & sentinel LN biopsy, right, Dr. Starks, 2/28/2019  XRT from 5/20/2019 to 6/18/2019    HPI: This is a 72-year-old obese  female with medical history significant for diastolic CHF HTN, major depressive disorder, sleep apnea, CVA, AAA repair, and Stage IIA (pT2, pN0(sn), cM0, G2, ER+, [place]-, HER2-) invasive lobular carcinoma of right breast, status post lumpectomy with sentinel lymph node biopsy in 2019.  She has since been on aromatase inhibitor and has been tolerating well.     Restaging PET-CT scan performed on 11/4/2020 revealed large hypermetabolic right subpectoral lymph node mass measuring 6.9 x 3.2 cm with SUV max of 13.0.     Right chest wall lymph node biopsy performed on 11/10/2020 revealed fragments of benign lymph node with no evidence of malignancy.  Excisional biopsy of right subpectoral lymph node was again performed on 12/2/2020 and returned as benign with no evidence of metastatic disease.     Case was discussed at the tumor conference and recommendation was to continue aromatase inhibitor with plan for repeat short interval imaging to reassess the right subpectoral lymph node mass.  This was discussed with patient, however, she was not satisfied with the recommendation and wanted a 2nd opinion with a different oncologist.     She was referred to breast surgeon in [facility]. She had MRI and has a planned surgical resection on 3/1/2021. Status post lymph node excision on 3/1/2021.     Her primary  Hematologist/Oncologist is Dr. Clarke.    Interval History: Patient presents for follow up on Femara. She presents alone and reports adherence to Femara with significant hot flashes. She recently started Effexor which she states has not been effective. She denies joint pain today. She reports being on steroids for gout recently.     Reviewed labs with patient:   CBC:   Recent Labs   Lab 09/16/22  1313   WBC 10.26   RBC 4.70   Hemoglobin 11.3 L   Hematocrit 37.1   Platelets 312   MCV 79 L   MCH 24.0 L   MCHC 30.5 L     CMP:  Recent Labs   Lab 09/16/22  1313   Glucose 152 H   Calcium 9.2   Albumin 3.0 L   Total Protein 6.4   Sodium 144   Potassium 3.9   CO2 24   Chloride 108   BUN 14   Creatinine 1.1   Alkaline Phosphatase 116   ALT 29   AST 17   Total Bilirubin 0.4       Social History     Socioeconomic History    Marital status:      Spouse name: Campos Luther    Number of children: 2   Tobacco Use    Smoking status: Never    Smokeless tobacco: Never   Substance and Sexual Activity    Alcohol use: No    Drug use: No    Sexual activity: Yes     Partners: Male     Birth control/protection: Post-menopausal     Social Determinants of Health     Financial Resource Strain: Medium Risk    Difficulty of Paying Living Expenses: Somewhat hard   Food Insecurity: No Food Insecurity    Worried About Running Out of Food in the Last Year: Never true    Ran Out of Food in the Last Year: Never true   Transportation Needs: No Transportation Needs    Lack of Transportation (Medical): No    Lack of Transportation (Non-Medical): No   Physical Activity: Inactive    Days of Exercise per Week: 0 days    Minutes of Exercise per Session: 0 min   Stress: Stress Concern Present    Feeling of Stress : Very much   Social Connections: Socially Integrated    Frequency of Communication with Friends and Family: More than three times a week    Frequency of Social Gatherings with Friends and Family: Never    Attends Sabianism Services:  More than 4 times per year    Active Member of Clubs or Organizations: Yes    Attends Club or Organization Meetings: 1 to 4 times per year    Marital Status:    Housing Stability: Low Risk     Unable to Pay for Housing in the Last Year: No    Number of Places Lived in the Last Year: 1    Unstable Housing in the Last Year: No     Past Medical History:   Diagnosis Date    Chronic diastolic congestive heart failure 2020    Encounter for blood transfusion     History of repair of aneurysm of abdominal aorta using endovascular stent graft     DR Bonds    Hx of psychiatric care     Hypertension     Major depressive disorder, single episode, moderate with anxious distress 2020    Malignant neoplasm of upper-outer quadrant of right breast in female, estrogen receptor positive 3/14/2019    radiation    Psychiatric problem     Sleep apnea     Sleep difficulties     Stroke 2009    no residual defect    Therapy      Family History   Problem Relation Age of Onset    Diabetes Maternal Grandmother     Diabetes Maternal Grandfather     Diabetes Mother     Hypertension Mother     Sickle cell anemia Daughter     Breast cancer Neg Hx     Colon cancer Neg Hx     Ovarian cancer Neg Hx      Past Surgical History:   Procedure Laterality Date    APPENDECTOMY      AXILLARY NODE DISSECTION Right 2020    Procedure: LYMPHADENECTOMY, AXILLARY;  Surgeon: Artemio Starks MD;  Location: [facility] OR;  Service: General;  Laterality: Right;    BIOPSY OF AXILLARY NODE Right 2021    Procedure: BIOPSY, LYMPH NODE, AXILLARY;  Surgeon: Artemio Sutton MD;  Location: [address];  Service: General;  Laterality: Right;    BREAST BIOPSY          BREAST LUMPECTOMY      2019     SECTION      x 1    OOPHORECTOMY      right     SENTINEL LYMPH NODE BIOPSY Right 2019    Procedure: BIOPSY, LYMPH NODE, SENTINEL;  Surgeon: Artemio Starks MD;  Location: [facility] OR;  Service: General;  Laterality: Right;    splenic  artery aneurysm repair      TONSILLECTOMY       Review of Systems   Constitutional:  Negative for appetite change and fatigue.   HENT:  Negative for mouth sores, rhinorrhea and sore throat.    Eyes: Negative.    Respiratory: Negative.     Cardiovascular: Negative.    Gastrointestinal:  Negative for constipation, diarrhea, nausea and vomiting.   Genitourinary: Negative.  Positive for hot flashes.   Musculoskeletal:  Positive for arthralgias (left toe pain).   Integumentary:  Negative.   Allergic/Immunologic: Negative.    Neurological:  Negative for weakness and numbness.   Hematological: Negative.    Psychiatric/Behavioral: Negative.         Medication List with Changes/Refills   Current Medications    ALBUTEROL (VENTOLIN HFA) 90 MCG/ACTUATION INHALER    Inhale 2 puffs into the lungs every 4 (four) hours as needed for Shortness of Breath.    ALPRAZOLAM (XANAX) 0.5 MG TABLET    Take 1 tablet (0.5 mg total) by mouth 2 (two) times daily as needed for Anxiety.    APIXABAN (ELIQUIS) 5 MG TAB    Take 1 tablet (5 mg total) by mouth 2 (two) times daily.    ATORVASTATIN (LIPITOR) 20 MG TABLET    Take 1 tablet (20 mg total) by mouth once daily.    BUTALBITAL-ACETAMINOPHEN-CAFFEINE -40 MG (FIORICET, ESGIC) -40 MG PER TABLET    Take 1 tablet by mouth daily as needed for Pain or Headaches.    CARVEDILOL (COREG) 25 MG TABLET    Take 1 tablet (25 mg total) by mouth 2 (two) times daily with meals.    CHOLECALCIFEROL, VITAMIN D3, 1,250 MCG (50,000 UNIT) CAPSULE    Take 1 capsule (50,000 Units total) by mouth once a week.    DICLOFENAC SODIUM (VOLTAREN) 1 % GEL    Apply 2 grams topically once daily.    DOXAZOSIN (CARDURA) 4 MG TABLET    Take 1 tablet (4 mg total) by mouth every evening.    DULOXETINE (CYMBALTA) 30 MG CAPSULE    Take 1 capsule (30 mg total) by mouth once daily.    FUROSEMIDE (LASIX) 20 MG TABLET    Take 1 tablet by mouth BID    HYDROCHLOROTHIAZIDE (HYDRODIURIL) 50 MG TABLET    Take 1 tablet (50 mg total) by  mouth once daily.    HYDROXYZINE HCL (ATARAX) 25 MG TABLET    Take 1 tablet (25 mg total) by mouth every evening.    LETROZOLE (FEMARA) 2.5 MG TAB    TAKE 1 TABLET EVERY DAY    LIDOCAINE (LIDODERM) 5 %    Place 2 patches onto the skin once daily. Remove & Discard patch within 12 hours or as directed by MD    MICARDIS 80 MG TAB    TAKE ONE TABLET BY MOUTH EVERY DAY    MULTIVITAMIN (THERAGRAN) PER TABLET    Take 1 tablet by mouth once daily.    OXYCODONE-ACETAMINOPHEN (PERCOCET) 7.5-325 MG PER TABLET    Take 1 tablet by mouth every 6 (six) hours as needed for Pain.    PREDNISONE (DELTASONE) 20 MG TABLET    Take 4 tabs PO day 1 then 3 tabs PO day 2 then 2 tabs PO day3 then take 1 tab PO day 4    PREGABALIN (LYRICA) 75 MG CAPSULE    Take 1 capsule (75 mg total) by mouth 3 (three) times daily.    TIZANIDINE (ZANAFLEX) 4 MG TABLET    Take 1 tablet (4 mg total) by mouth every 8 (eight) hours as needed (muscle spasm).    VENLAFAXINE (EFFEXOR-XR) 37.5 MG 24 HR CAPSULE    Take 1 capsule (37.5 mg total) by mouth 2 (two) times a day.    ZOLPIDEM (AMBIEN) 5 MG TAB    Take 1 tablet (5 mg total) by mouth nightly as needed (sleep).     Objective:     Vitals:    09/16/22 1331   BP: 125/80   Pulse: 87   Temp: 97.2 °F (36.2 °C)     Physical Exam  Vitals reviewed.   Constitutional:       Appearance: Normal appearance. She is obese.   HENT:      Head: Normocephalic.      Mouth/Throat:      Comments: Wearing mask    Eyes:      Extraocular Movements: Extraocular movements intact.      Pupils: Pupils are equal, round, and reactive to light.      Comments: Glasses       Cardiovascular:      Rate and Rhythm: Normal rate and regular rhythm.      Heart sounds: Normal heart sounds.   Pulmonary:      Effort: Pulmonary effort is normal.      Breath sounds: Normal breath sounds.   Abdominal:      General: Bowel sounds are normal.      Palpations: Abdomen is soft.      Comments: rounded     Genitourinary:     Comments: deferred    Musculoskeletal:          General: Normal range of motion.      Cervical back: Normal range of motion and neck supple.   Skin:     General: Skin is warm and dry.   Neurological:      Mental Status: She is alert and oriented to person, place, and time.   Psychiatric:         Behavior: Behavior normal.         Thought Content: Thought content normal.        (2) Ambulatory and capable of self care, unable to carry out work activity, up and about > 50% or waking hours  Assessment:     Problem List Items Addressed This Visit          Hematology    Acute pulmonary embolism     Likely provoked due to hypercoagulable state from breast cancer and obesity. Continue apixaban at 5 mg b.i.d..  Advised that she will be on long-term anticoagulation given recurrent thrombosis history as well as active malignancy.            Oncology    Malignant neoplasm of upper-outer quadrant of right breast in female, estrogen receptor positive - Primary (Chronic)     72-year-old female with stage IIA (pT2, pN0(sn), cM0, G2, ER+, [place]-, HER2-) invasive lobular carcinoma of right breast, status post lumpectomy with sentinel lymph node biopsy in 2019.  Currently on Femara and tolerating well.  DEXA scan ordered.  Yet to be obtained.     Unfortunately, imaging studies have shown persistently enlarged right subpectoral lymph node as well as FDG avid left posterior cervical and supraclavicular lymph nodes.  Multiple biopsies in the past have been nondiagnostic for malignancy.  Case was presented at multi-disciplinary tumor conference with recommendation to continue Arimidex.  Patient was not satisfied with recommendation a requested for 2nd opinion.     She was evaluated by Dr. Sutton in [place] who recommended MRI of breast.     MRI was performed on 02/23/2021 and showed lymph node mass deep to the right pectoralis minor muscle measuring 6.6 x 4.7 x 2.9 cm. There were also adjacent satellite lymph nodes anteriorly to the dominant lymph node measured 1.1 x 0.8 cm  and 0.6 x 0.4 cm.  Also described was a mass effect upon the right subclavian vein.  The mass does not in case or envelope the subclavian neurovascular structures.     Given the above findings, recommendation was made to excise the mass if not for diagnostic purposes for pain management.  She underwent lymph node excision on 03/01/2021, pathology returned back as lymph node with follicular hyperplasia. No evidence of metastatic carcinoma or lymphoma.     She was continued on Femara with plan for short interval PET scan.     Patient had a PET scan on 4/14/2021 that showed interim right axillary lymph node excision with large residual hypermetabolic right subpectoral lymph node mass.  Also noted interval development of subcentimeter mildly FDG avid left posterior cervical and supraclavicular lymph nodes. No FDG avid mediastinal or hilar nodes.No FDG avid pulmonary nodules/masses.      Had diagnostic bilateral mammography on 9/24/2021, results showed a left breast subcentimeter mass at the 9 o'clock position which is new when compared to mammography from 2019.  Will plan a short-interval mammogram in 6 months to re-assess left breast mass.There was no mammographic evidence of disease in the right breast.     Saw Dr. Sutton in [place] who recommended a repeat PET-CT scan. Results from PET-CT scan showed interval enlargement of subpectoral mass on the right with sustained highly FDG avidity.     Me was she continues to complain of shoulder pain and breast discomfort.  Most recently CT neck chest with contrast was obtained on 03/17/2022, when compared to PET scan from October of 2021, the right subpectoral lymph node has not changed and still measures about 6 cm maximally.     Given continuing discomfort and pain associated with these enlarged lymph nodes.  She was referred to surgical oncologist following extensive conversation, it appears the risk of repeat surgical resection outweighs the benefits.  Decision was  made to optimize neuropathic pain management.     Lyrica was initiated for neuropathic pain control. Meanwhile I recommend continued Femara and pain management.            Endocrine    Morbid obesity with BMI of 45.0-49.9, adult     Counseled on lifestyle modification and exercise.          Plan:     Malignant neoplasm of upper-outer quadrant of right breast in female, estrogen receptor positive    Acute pulmonary embolism, unspecified pulmonary embolism type, unspecified whether acute cor pulmonale present    Morbid obesity with BMI of 45.0-49.9, adult    Labs reviewed.   Continue Femara daily with calcium + vitamin D.  Eliquis 5mg po BID.  Follow up in 12 weeks with CBC and Comprehensive Metabolic Panel.    I will review assessment/plan with collaborating physician Dr. Clarke.      JACKELINE Stewart

## 2022-09-14 NOTE — ASSESSMENT & PLAN NOTE
72-year-old female with stage IIA (pT2, pN0(sn), cM0, G2, ER+, [place]-, HER2-) invasive lobular carcinoma of right breast, status post lumpectomy with sentinel lymph node biopsy in 2019.  Currently on Femara and tolerating well.  DEXA scan ordered.  Yet to be obtained.     Unfortunately, imaging studies have shown persistently enlarged right subpectoral lymph node as well as FDG avid left posterior cervical and supraclavicular lymph nodes.  Multiple biopsies in the past have been nondiagnostic for malignancy.  Case was presented at multi-disciplinary tumor conference with recommendation to continue Arimidex.  Patient was not satisfied with recommendation a requested for 2nd opinion.     She was evaluated by Dr. Sutton in [place] who recommended MRI of breast.     MRI was performed on 02/23/2021 and showed lymph node mass deep to the right pectoralis minor muscle measuring 6.6 x 4.7 x 2.9 cm. There were also adjacent satellite lymph nodes anteriorly to the dominant lymph node measured 1.1 x 0.8 cm and 0.6 x 0.4 cm.  Also described was a mass effect upon the right subclavian vein.  The mass does not in case or envelope the subclavian neurovascular structures.     Given the above findings, recommendation was made to excise the mass if not for diagnostic purposes for pain management.  She underwent lymph node excision on 03/01/2021, pathology returned back as lymph node with follicular hyperplasia. No evidence of metastatic carcinoma or lymphoma.     She was continued on Femara with plan for short interval PET scan.     Patient had a PET scan on 4/14/2021 that showed interim right axillary lymph node excision with large residual hypermetabolic right subpectoral lymph node mass.  Also noted interval development of subcentimeter mildly FDG avid left posterior cervical and supraclavicular lymph nodes. No FDG avid mediastinal or hilar nodes.No FDG avid pulmonary nodules/masses.      Had diagnostic bilateral mammography on  9/24/2021, results showed a left breast subcentimeter mass at the 9 o'clock position which is new when compared to mammography from 2019.  Will plan a short-interval mammogram in 6 months to re-assess left breast mass.There was no mammographic evidence of disease in the right breast.     Saw Dr. Sutton in [place] who recommended a repeat PET-CT scan. Results from PET-CT scan showed interval enlargement of subpectoral mass on the right with sustained highly FDG avidity.     Me was she continues to complain of shoulder pain and breast discomfort.  Most recently CT neck chest with contrast was obtained on 03/17/2022, when compared to PET scan from October of 2021, the right subpectoral lymph node has not changed and still measures about 6 cm maximally.     Given continuing discomfort and pain associated with these enlarged lymph nodes.  She was referred to surgical oncologist following extensive conversation, it appears the risk of repeat surgical resection outweighs the benefits.  Decision was made to optimize neuropathic pain management.     Lyrica was initiated for neuropathic pain control. Meanwhile I recommend continued Femara and pain management.

## 2022-09-16 ENCOUNTER — OFFICE VISIT (OUTPATIENT)
Dept: HEMATOLOGY/ONCOLOGY | Facility: CLINIC | Age: 72
End: 2022-09-16
Payer: MEDICARE

## 2022-09-16 ENCOUNTER — LAB VISIT (OUTPATIENT)
Dept: LAB | Facility: HOSPITAL | Age: 72
End: 2022-09-16
Attending: NURSE PRACTITIONER
Payer: MEDICARE

## 2022-09-16 VITALS
WEIGHT: 293 LBS | BODY MASS INDEX: 39.68 KG/M2 | DIASTOLIC BLOOD PRESSURE: 80 MMHG | HEIGHT: 72 IN | SYSTOLIC BLOOD PRESSURE: 125 MMHG | OXYGEN SATURATION: 98 % | HEART RATE: 87 BPM | TEMPERATURE: 97 F

## 2022-09-16 DIAGNOSIS — C50.411 MALIGNANT NEOPLASM OF UPPER-OUTER QUADRANT OF RIGHT BREAST IN FEMALE, ESTROGEN RECEPTOR POSITIVE: ICD-10-CM

## 2022-09-16 DIAGNOSIS — Z17.0 MALIGNANT NEOPLASM OF UPPER-OUTER QUADRANT OF RIGHT BREAST IN FEMALE, ESTROGEN RECEPTOR POSITIVE: ICD-10-CM

## 2022-09-16 DIAGNOSIS — I26.99 ACUTE PULMONARY EMBOLISM, UNSPECIFIED PULMONARY EMBOLISM TYPE, UNSPECIFIED WHETHER ACUTE COR PULMONALE PRESENT: ICD-10-CM

## 2022-09-16 DIAGNOSIS — G62.9 NEUROPATHY: ICD-10-CM

## 2022-09-16 DIAGNOSIS — Z17.0 MALIGNANT NEOPLASM OF UPPER-OUTER QUADRANT OF RIGHT BREAST IN FEMALE, ESTROGEN RECEPTOR POSITIVE: Primary | ICD-10-CM

## 2022-09-16 DIAGNOSIS — C50.411 MALIGNANT NEOPLASM OF UPPER-OUTER QUADRANT OF RIGHT BREAST IN FEMALE, ESTROGEN RECEPTOR POSITIVE: Primary | ICD-10-CM

## 2022-09-16 DIAGNOSIS — E66.01 MORBID OBESITY WITH BMI OF 45.0-49.9, ADULT: ICD-10-CM

## 2022-09-16 DIAGNOSIS — Z86.711 HISTORY OF PULMONARY EMBOLISM: ICD-10-CM

## 2022-09-16 LAB
ALBUMIN SERPL BCP-MCNC: 3 G/DL (ref 3.5–5.2)
ALP SERPL-CCNC: 116 U/L (ref 55–135)
ALT SERPL W/O P-5'-P-CCNC: 29 U/L (ref 10–44)
ANION GAP SERPL CALC-SCNC: 12 MMOL/L (ref 8–16)
AST SERPL-CCNC: 17 U/L (ref 10–40)
BASOPHILS # BLD AUTO: 0.09 K/UL (ref 0–0.2)
BASOPHILS NFR BLD: 0.9 % (ref 0–1.9)
BILIRUB SERPL-MCNC: 0.4 MG/DL (ref 0.1–1)
BUN SERPL-MCNC: 14 MG/DL (ref 8–23)
CALCIUM SERPL-MCNC: 9.2 MG/DL (ref 8.7–10.5)
CHLORIDE SERPL-SCNC: 108 MMOL/L (ref 95–110)
CO2 SERPL-SCNC: 24 MMOL/L (ref 23–29)
CREAT SERPL-MCNC: 1.1 MG/DL (ref 0.5–1.4)
DIFFERENTIAL METHOD: ABNORMAL
EOSINOPHIL # BLD AUTO: 0.2 K/UL (ref 0–0.5)
EOSINOPHIL NFR BLD: 1.9 % (ref 0–8)
ERYTHROCYTE [DISTWIDTH] IN BLOOD BY AUTOMATED COUNT: 16.7 % (ref 11.5–14.5)
EST. GFR  (NO RACE VARIABLE): 54 ML/MIN/1.73 M^2
GLUCOSE SERPL-MCNC: 152 MG/DL (ref 70–110)
HCT VFR BLD AUTO: 37.1 % (ref 37–48.5)
HGB BLD-MCNC: 11.3 G/DL (ref 12–16)
IMM GRANULOCYTES # BLD AUTO: 0.03 K/UL (ref 0–0.04)
IMM GRANULOCYTES NFR BLD AUTO: 0.3 % (ref 0–0.5)
LYMPHOCYTES # BLD AUTO: 2.7 K/UL (ref 1–4.8)
LYMPHOCYTES NFR BLD: 26.7 % (ref 18–48)
MCH RBC QN AUTO: 24 PG (ref 27–31)
MCHC RBC AUTO-ENTMCNC: 30.5 G/DL (ref 32–36)
MCV RBC AUTO: 79 FL (ref 82–98)
MONOCYTES # BLD AUTO: 0.6 K/UL (ref 0.3–1)
MONOCYTES NFR BLD: 6.1 % (ref 4–15)
NEUTROPHILS # BLD AUTO: 6.6 K/UL (ref 1.8–7.7)
NEUTROPHILS NFR BLD: 64.1 % (ref 38–73)
NRBC BLD-RTO: 0 /100 WBC
OHS CV HOLTER SINUS AVERAGE HR: 69
OHS CV HOLTER SINUS MAX HR: 120
OHS CV HOLTER SINUS MIN HR: 45
PLATELET # BLD AUTO: 312 K/UL (ref 150–450)
PMV BLD AUTO: 9.4 FL (ref 9.2–12.9)
POTASSIUM SERPL-SCNC: 3.9 MMOL/L (ref 3.5–5.1)
PROT SERPL-MCNC: 6.4 G/DL (ref 6–8.4)
RBC # BLD AUTO: 4.7 M/UL (ref 4–5.4)
SODIUM SERPL-SCNC: 144 MMOL/L (ref 136–145)
WBC # BLD AUTO: 10.26 K/UL (ref 3.9–12.7)

## 2022-09-16 PROCEDURE — 99499 RISK ADDL DX/OHS AUDIT: ICD-10-PCS | Mod: HCNC,S$GLB,, | Performed by: NURSE PRACTITIONER

## 2022-09-16 PROCEDURE — 3288F FALL RISK ASSESSMENT DOCD: CPT | Mod: CPTII,S$GLB,, | Performed by: NURSE PRACTITIONER

## 2022-09-16 PROCEDURE — 3074F SYST BP LT 130 MM HG: CPT | Mod: CPTII,S$GLB,, | Performed by: NURSE PRACTITIONER

## 2022-09-16 PROCEDURE — 1157F ADVNC CARE PLAN IN RCRD: CPT | Mod: CPTII,S$GLB,, | Performed by: NURSE PRACTITIONER

## 2022-09-16 PROCEDURE — 99214 OFFICE O/P EST MOD 30 MIN: CPT | Mod: S$GLB,,, | Performed by: NURSE PRACTITIONER

## 2022-09-16 PROCEDURE — 3008F PR BODY MASS INDEX (BMI) DOCUMENTED: ICD-10-PCS | Mod: CPTII,S$GLB,, | Performed by: NURSE PRACTITIONER

## 2022-09-16 PROCEDURE — 85025 COMPLETE CBC W/AUTO DIFF WBC: CPT | Performed by: NURSE PRACTITIONER

## 2022-09-16 PROCEDURE — 1160F PR REVIEW ALL MEDS BY PRESCRIBER/CLIN PHARMACIST DOCUMENTED: ICD-10-PCS | Mod: CPTII,S$GLB,, | Performed by: NURSE PRACTITIONER

## 2022-09-16 PROCEDURE — 1159F MED LIST DOCD IN RCRD: CPT | Mod: CPTII,S$GLB,, | Performed by: NURSE PRACTITIONER

## 2022-09-16 PROCEDURE — 1157F PR ADVANCE CARE PLAN OR EQUIV PRESENT IN MEDICAL RECORD: ICD-10-PCS | Mod: CPTII,S$GLB,, | Performed by: NURSE PRACTITIONER

## 2022-09-16 PROCEDURE — 3074F PR MOST RECENT SYSTOLIC BLOOD PRESSURE < 130 MM HG: ICD-10-PCS | Mod: CPTII,S$GLB,, | Performed by: NURSE PRACTITIONER

## 2022-09-16 PROCEDURE — 99499 UNLISTED E&M SERVICE: CPT | Mod: HCNC,S$GLB,, | Performed by: NURSE PRACTITIONER

## 2022-09-16 PROCEDURE — 3288F PR FALLS RISK ASSESSMENT DOCUMENTED: ICD-10-PCS | Mod: CPTII,S$GLB,, | Performed by: NURSE PRACTITIONER

## 2022-09-16 PROCEDURE — 99999 PR PBB SHADOW E&M-EST. PATIENT-LVL IV: CPT | Mod: PBBFAC,,, | Performed by: NURSE PRACTITIONER

## 2022-09-16 PROCEDURE — 99214 PR OFFICE/OUTPT VISIT, EST, LEVL IV, 30-39 MIN: ICD-10-PCS | Mod: S$GLB,,, | Performed by: NURSE PRACTITIONER

## 2022-09-16 PROCEDURE — 80053 COMPREHEN METABOLIC PANEL: CPT | Performed by: NURSE PRACTITIONER

## 2022-09-16 PROCEDURE — 1126F PR PAIN SEVERITY QUANTIFIED, NO PAIN PRESENT: ICD-10-PCS | Mod: CPTII,S$GLB,, | Performed by: NURSE PRACTITIONER

## 2022-09-16 PROCEDURE — 1100F PTFALLS ASSESS-DOCD GE2>/YR: CPT | Mod: CPTII,S$GLB,, | Performed by: NURSE PRACTITIONER

## 2022-09-16 PROCEDURE — 4010F PR ACE/ARB THEARPY RXD/TAKEN: ICD-10-PCS | Mod: CPTII,S$GLB,, | Performed by: NURSE PRACTITIONER

## 2022-09-16 PROCEDURE — 3079F DIAST BP 80-89 MM HG: CPT | Mod: CPTII,S$GLB,, | Performed by: NURSE PRACTITIONER

## 2022-09-16 PROCEDURE — 36415 COLL VENOUS BLD VENIPUNCTURE: CPT | Performed by: NURSE PRACTITIONER

## 2022-09-16 PROCEDURE — 1100F PR PT FALLS ASSESS DOC 2+ FALLS/FALL W/INJURY/YR: ICD-10-PCS | Mod: CPTII,S$GLB,, | Performed by: NURSE PRACTITIONER

## 2022-09-16 PROCEDURE — 1159F PR MEDICATION LIST DOCUMENTED IN MEDICAL RECORD: ICD-10-PCS | Mod: CPTII,S$GLB,, | Performed by: NURSE PRACTITIONER

## 2022-09-16 PROCEDURE — 99999 PR PBB SHADOW E&M-EST. PATIENT-LVL IV: ICD-10-PCS | Mod: PBBFAC,,, | Performed by: NURSE PRACTITIONER

## 2022-09-16 PROCEDURE — 3008F BODY MASS INDEX DOCD: CPT | Mod: CPTII,S$GLB,, | Performed by: NURSE PRACTITIONER

## 2022-09-16 PROCEDURE — 3079F PR MOST RECENT DIASTOLIC BLOOD PRESSURE 80-89 MM HG: ICD-10-PCS | Mod: CPTII,S$GLB,, | Performed by: NURSE PRACTITIONER

## 2022-09-16 PROCEDURE — 1160F RVW MEDS BY RX/DR IN RCRD: CPT | Mod: CPTII,S$GLB,, | Performed by: NURSE PRACTITIONER

## 2022-09-16 PROCEDURE — 1126F AMNT PAIN NOTED NONE PRSNT: CPT | Mod: CPTII,S$GLB,, | Performed by: NURSE PRACTITIONER

## 2022-09-16 PROCEDURE — 4010F ACE/ARB THERAPY RXD/TAKEN: CPT | Mod: CPTII,S$GLB,, | Performed by: NURSE PRACTITIONER

## 2022-09-19 ENCOUNTER — HOSPITAL ENCOUNTER (OUTPATIENT)
Dept: RADIOLOGY | Facility: HOSPITAL | Age: 72
Discharge: HOME | End: 2022-09-19
Attending: SURGERY
Payer: MEDICARE

## 2022-09-19 DIAGNOSIS — R22.31 AXILLARY MASS, RIGHT: ICD-10-CM

## 2022-09-19 DIAGNOSIS — C50.411 MALIGNANT NEOPLASM OF UPPER-OUTER QUADRANT OF RIGHT BREAST IN FEMALE, ESTROGEN RECEPTOR POSITIVE: ICD-10-CM

## 2022-09-19 DIAGNOSIS — Z17.0 MALIGNANT NEOPLASM OF UPPER-OUTER QUADRANT OF RIGHT BREAST IN FEMALE, ESTROGEN RECEPTOR POSITIVE: ICD-10-CM

## 2022-09-19 PROCEDURE — 70491 CT NECK CHEST WITH CONTRAST (XPD): ICD-10-PCS | Mod: 26,,, | Performed by: RADIOLOGY

## 2022-09-19 PROCEDURE — 71260 CT THORAX DX C+: CPT | Mod: 26,,, | Performed by: RADIOLOGY

## 2022-09-19 PROCEDURE — 71260 CT NECK CHEST WITH CONTRAST (XPD): ICD-10-PCS | Mod: 26,,, | Performed by: RADIOLOGY

## 2022-09-19 PROCEDURE — 71260 CT THORAX DX C+: CPT | Mod: TC

## 2022-09-19 PROCEDURE — 25500020 PHARM REV CODE 255: Performed by: SURGERY

## 2022-09-19 PROCEDURE — 70491 CT SOFT TISSUE NECK W/DYE: CPT | Mod: 26,,, | Performed by: RADIOLOGY

## 2022-09-19 RX ADMIN — IOHEXOL 125 ML: 350 INJECTION, SOLUTION INTRAVENOUS at 10:09

## 2022-09-21 ENCOUNTER — TELEPHONE (OUTPATIENT)
Dept: CARDIOLOGY | Facility: HOSPITAL | Age: 72
End: 2022-09-21
Payer: MEDICARE

## 2022-09-23 ENCOUNTER — TELEPHONE (OUTPATIENT)
Dept: INTERNAL MEDICINE | Facility: CLINIC | Age: 72
End: 2022-09-23
Payer: MEDICARE

## 2022-09-23 DIAGNOSIS — Z17.0 MALIGNANT NEOPLASM OF UPPER-OUTER QUADRANT OF RIGHT BREAST IN FEMALE, ESTROGEN RECEPTOR POSITIVE: Primary | ICD-10-CM

## 2022-09-23 DIAGNOSIS — C50.411 MALIGNANT NEOPLASM OF UPPER-OUTER QUADRANT OF RIGHT BREAST IN FEMALE, ESTROGEN RECEPTOR POSITIVE: Primary | ICD-10-CM

## 2022-09-23 DIAGNOSIS — Z12.31 SCREENING MAMMOGRAM FOR BREAST CANCER: ICD-10-CM

## 2022-09-23 NOTE — TELEPHONE ENCOUNTER
----- Message from Lawanda Daly sent at 9/23/2022  8:07 AM CDT -----  .Type:  Mammogram    Caller is requesting to schedule their annual mammogram appointment.  Order is not listed in EPIC.  Please enter order and contact patient to schedule.  Name of Caller:pt   Where would they like the mammogram performed?[facility]   Would the patient rather a call back or a response via [facility]? Call back   Best Call Back Number:.[phone]    Additional Information: Pt called in re: she received a yearly reminder for her mammo. Pt reminder that she did find a knot in  R breast     Thanks bsc

## 2022-09-28 ENCOUNTER — PATIENT MESSAGE (OUTPATIENT)
Dept: INTERNAL MEDICINE | Facility: CLINIC | Age: 72
End: 2022-09-28
Payer: MEDICARE

## 2022-09-28 DIAGNOSIS — F32.1 MAJOR DEPRESSIVE DISORDER, SINGLE EPISODE, MODERATE WITH ANXIOUS DISTRESS: ICD-10-CM

## 2022-09-28 DIAGNOSIS — F54 PSYCHOLOGICAL FACTORS AFFECTING MEDICAL CONDITION: ICD-10-CM

## 2022-09-29 RX ORDER — ALPRAZOLAM 0.5 MG/1
0.5 TABLET ORAL 2 TIMES DAILY PRN
Qty: 180 TABLET | Refills: 0 | Status: SHIPPED | OUTPATIENT
Start: 2022-09-29 | End: 2022-11-11 | Stop reason: SDUPTHER

## 2022-09-29 RX ORDER — BUTALBITAL, ACETAMINOPHEN AND CAFFEINE 50; 325; 40 MG/1; MG/1; MG/1
1 TABLET ORAL DAILY PRN
Qty: 70 TABLET | Refills: 1 | Status: SHIPPED | OUTPATIENT
Start: 2022-09-29 | End: 2022-11-28 | Stop reason: SDUPTHER

## 2022-09-29 RX ORDER — ATORVASTATIN CALCIUM 20 MG/1
20 TABLET, FILM COATED ORAL DAILY
Qty: 90 TABLET | Refills: 0 | Status: SHIPPED | OUTPATIENT
Start: 2022-09-29 | End: 2022-11-11 | Stop reason: SDUPTHER

## 2022-09-29 NOTE — TELEPHONE ENCOUNTER
Refill Routing Note   Medication(s) are not appropriate for processing by [facility] for the following reason(s):      - Medication is a new start (<3 months)    [facility] action(s):  Defer          Medication reconciliation completed: No     Appointments  past 12m or future 3m with PCP    Date Provider   Last Visit   7/21/2022 Peace Arias MD   Next Visit   Visit date not found Peace Arias MD   ED visits in past 90 days: 0        Note composed:11:49 AM 09/29/2022

## 2022-09-29 NOTE — TELEPHONE ENCOUNTER
No new care gaps identified.  [facility] Embedded Care Gaps. Reference number: [id number]. 9/29/2022   5:08:59 AM CDT

## 2022-10-05 ENCOUNTER — HOSPITAL ENCOUNTER (OUTPATIENT)
Dept: CARDIOLOGY | Facility: HOSPITAL | Age: 72
Discharge: HOME | End: 2022-10-05
Attending: STUDENT IN AN ORGANIZED HEALTH CARE EDUCATION/TRAINING PROGRAM
Payer: MEDICARE

## 2022-10-05 VITALS
BODY MASS INDEX: 39.68 KG/M2 | SYSTOLIC BLOOD PRESSURE: 125 MMHG | DIASTOLIC BLOOD PRESSURE: 80 MMHG | WEIGHT: 293 LBS | HEIGHT: 72 IN

## 2022-10-05 DIAGNOSIS — R60.9 EDEMA, UNSPECIFIED TYPE: ICD-10-CM

## 2022-10-05 PROCEDURE — 93970 EXTREMITY STUDY: CPT | Mod: 50

## 2022-10-05 PROCEDURE — 93970 EXTREMITY STUDY: CPT | Mod: 26,,, | Performed by: INTERNAL MEDICINE

## 2022-10-05 PROCEDURE — 93970 CV US DOPPLER VENOUS LEGS BILATERAL (CUPID ONLY): ICD-10-PCS | Mod: 26,,, | Performed by: INTERNAL MEDICINE

## 2022-10-10 ENCOUNTER — PATIENT MESSAGE (OUTPATIENT)
Dept: PHARMACY | Facility: CLINIC | Age: 72
End: 2022-10-10
Payer: MEDICARE

## 2022-10-11 ENCOUNTER — PATIENT MESSAGE (OUTPATIENT)
Dept: INTERNAL MEDICINE | Facility: CLINIC | Age: 72
End: 2022-10-11
Payer: MEDICARE

## 2022-10-11 DIAGNOSIS — M10.372 GOUT OF LEFT FOOT DUE TO RENAL IMPAIRMENT, UNSPECIFIED CHRONICITY: ICD-10-CM

## 2022-10-11 RX ORDER — PREDNISONE 20 MG/1
TABLET ORAL
Qty: 10 TABLET | Refills: 0 | Status: SHIPPED | OUTPATIENT
Start: 2022-10-11 | End: 2023-03-09 | Stop reason: ALTCHOICE

## 2022-10-12 ENCOUNTER — OFFICE VISIT (OUTPATIENT)
Dept: CARDIOLOGY | Facility: CLINIC | Age: 72
End: 2022-10-12
Payer: MEDICARE

## 2022-10-12 DIAGNOSIS — E66.01 MORBID OBESITY WITH BMI OF 40.0-44.9, ADULT: ICD-10-CM

## 2022-10-12 DIAGNOSIS — R53.1 RIGHT SIDED WEAKNESS: ICD-10-CM

## 2022-10-12 DIAGNOSIS — R60.9 EDEMA, UNSPECIFIED TYPE: ICD-10-CM

## 2022-10-12 DIAGNOSIS — I50.32 CHRONIC DIASTOLIC CONGESTIVE HEART FAILURE: ICD-10-CM

## 2022-10-12 DIAGNOSIS — C50.411 MALIGNANT NEOPLASM OF UPPER-OUTER QUADRANT OF RIGHT BREAST IN FEMALE, ESTROGEN RECEPTOR POSITIVE: ICD-10-CM

## 2022-10-12 DIAGNOSIS — R55 SYNCOPE AND COLLAPSE: ICD-10-CM

## 2022-10-12 DIAGNOSIS — Z17.0 MALIGNANT NEOPLASM OF UPPER-OUTER QUADRANT OF RIGHT BREAST IN FEMALE, ESTROGEN RECEPTOR POSITIVE: ICD-10-CM

## 2022-10-12 DIAGNOSIS — Z86.73 HISTORY OF CVA (CEREBROVASCULAR ACCIDENT): ICD-10-CM

## 2022-10-12 DIAGNOSIS — M79.604 RIGHT LEG PAIN: ICD-10-CM

## 2022-10-12 DIAGNOSIS — I10 PRIMARY HYPERTENSION: Primary | ICD-10-CM

## 2022-10-12 DIAGNOSIS — I71.40 ABDOMINAL AORTIC ANEURYSM (AAA) WITHOUT RUPTURE, UNSPECIFIED PART: ICD-10-CM

## 2022-10-12 DIAGNOSIS — R07.9 CHEST PAIN, UNSPECIFIED TYPE: ICD-10-CM

## 2022-10-12 DIAGNOSIS — I26.99 RECURRENT PULMONARY EMBOLI: ICD-10-CM

## 2022-10-12 DIAGNOSIS — E78.5 HYPERLIPIDEMIA, UNSPECIFIED HYPERLIPIDEMIA TYPE: ICD-10-CM

## 2022-10-12 PROCEDURE — 99213 PR OFFICE/OUTPT VISIT, EST, LEVL III, 20-29 MIN: ICD-10-PCS | Mod: 95,,, | Performed by: STUDENT IN AN ORGANIZED HEALTH CARE EDUCATION/TRAINING PROGRAM

## 2022-10-12 PROCEDURE — 4010F PR ACE/ARB THEARPY RXD/TAKEN: ICD-10-PCS | Mod: CPTII,95,, | Performed by: STUDENT IN AN ORGANIZED HEALTH CARE EDUCATION/TRAINING PROGRAM

## 2022-10-12 PROCEDURE — 99213 OFFICE O/P EST LOW 20 MIN: CPT | Mod: 95,,, | Performed by: STUDENT IN AN ORGANIZED HEALTH CARE EDUCATION/TRAINING PROGRAM

## 2022-10-12 PROCEDURE — 4010F ACE/ARB THERAPY RXD/TAKEN: CPT | Mod: CPTII,95,, | Performed by: STUDENT IN AN ORGANIZED HEALTH CARE EDUCATION/TRAINING PROGRAM

## 2022-10-12 PROCEDURE — 1157F ADVNC CARE PLAN IN RCRD: CPT | Mod: CPTII,95,, | Performed by: STUDENT IN AN ORGANIZED HEALTH CARE EDUCATION/TRAINING PROGRAM

## 2022-10-12 PROCEDURE — 1157F PR ADVANCE CARE PLAN OR EQUIV PRESENT IN MEDICAL RECORD: ICD-10-PCS | Mod: CPTII,95,, | Performed by: STUDENT IN AN ORGANIZED HEALTH CARE EDUCATION/TRAINING PROGRAM

## 2022-10-12 NOTE — PROGRESS NOTES
Subjective:   Patient ID:  Susu Luther is a 72 y.o. female who presents for follow up of No chief complaint on file.      The patient location is: home   The chief complaint leading to consultation is: follow up    Visit type: audiovisual    Face to Face time with patient: 10 minutes  10 minutes of total time spent on the encounter, which includes face to face time and non-face to face time preparing to see the patient (eg, review of tests), Obtaining and/or reviewing separately obtained history, Documenting clinical information in the electronic or other health record, Independently interpreting results (not separately reported) and communicating results to the patient/family/caregiver, or Care coordination (not separately reported).         Each patient to whom he or she provides medical services by telemedicine is:  (1) informed of the relationship between the physician and patient and the respective role of any other health care provider with respect to management of the patient; and (2) notified that he or she may decline to receive medical services by telemedicine and may withdraw from such care at any time.    Notes:     12/1/20  69 yo female, care establish. Prior cardiologist Dr ackerman   [facility] HTN, CVA (2009), Right breast CA, Lumpectomy 3/2019, h/o PE off OAC 2 yrs ago.  AAA, s/p transcutaneous patch by Dr. Bonds, obesity knee OA imbalanced walker dependent  C/o SOB after walking few steps and dizziness. Had vision issue 1 month ago due to uncontrolled HTN  No chest pain  Sleeps with 2 pillows  Decent appetites  Leg calf pain worse at night  No smoking/drinking  ekg today NSR LVH.    ECH normal EF, grade II DD, LAE and PAP 56 mmHG   Chest CTA negative for PE  BP high    A1c controlled    S/p Open subpectoral lymph node biopsy on the right on 12/02/2020 by Dr. Starks  Still right chest, under arm and shoudler supersensitive pain      Shortness of Breath  Associated symptoms  include chest pain and leg swelling. Pertinent negatives include no abdominal pain, claudication, fever, headaches, neck pain, orthopnea, PND, rash, sputum production, syncope, vomiting or wheezing.   Chest Pain   Associated symptoms include shortness of breath. Pertinent negatives include no abdominal pain, back pain, claudication, cough, diaphoresis, dizziness, fever, headaches, irregular heartbeat, malaise/fatigue, nausea, near-syncope, numbness, orthopnea, palpitations, PND, sputum production, syncope, vomiting or weakness.   Her past medical history is significant for CHF.   Pertinent negatives for past medical history include no seizures.   Congestive Heart Failure  Associated symptoms include chest pain and shortness of breath. Pertinent negatives include no abdominal pain, claudication, near-syncope or palpitations.   2/28/22  Patient was admitted to [facility] for shortness of breath.  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  Started on heparin drip in transition to oral anticoagulation, now on Eliquis.  Recurrent PE.  On Femara. Has another lump in breast on right side, recently seen on PET scan.   Due to TANNER, was told to stop hctz, telmisartan.  She has ran out of clonidine. Does not take the ASA, hydralazine, amlodipine.   Blood pressure normotensive.  Reports severe headaches.  Has been out of Fioricet.  Echocardiogram with normal EF  Reports shortness of breath, chest heaviness mostly right-sided.      3/14/22  Still having SOB due to PE.  No bleeding issues while on eliquis.  Chest pain is substernal to right sided.   Lasix doesn't seem to help.   A reports intermittent leg pain right > left.  DVT ultrasound in-hospital with no evidence of DVT.  Following Hematology-Oncology.  Patient does not want surgery if needed for possible breast cancer.  Denies syncope, fever, chills.         4/18/22  Comes in with daughter who lives in [place].  Concerned that  she may oxygen issues and may need home oxygen. O2 98%  Reports LE swelling and SOB  Reports chest pain comes on with SOB, did not have chest pain prior to PE  Has not been on lasix, has been held  Reports balancing issues- can do PT once symptoms improve   Denies syncope, fever, chills.       5/16/22  Has been feeling weak last couple days  Feels chest tightness with walking, also CURIEL- feels worse than before. 97% O2 in clinic   Reports dizziness after taking medications   BP low today   Says recurrent cancer may be spreading   No bleeding on eliquis   Reports orthopnea, PND.  Not feeling well- Does not want to the hospital     Denies syncope.      6/13/22  Not feeling well today  Reports chest pain and SOB worsening over the last 2 weeks   Ddimer trending, downTroponin neg and BNP nl on 5/16/22  EKG today without significant abnormalities   Reports recent diagnosis of breast cancer is progressing  Reports right-sided arm weakness  Patient has been increasingly stressed    Denies syncope, lower extremity swelling.      7/6/2022   went to emergency room 6/13/2022, was worked up for stroke  MRI brain negative  Repeat CTA chest without PE, right-sided mass 6 cm, stable  All blood pressure medications.  Due to hypotension  Started taking Lasix today  Has significant lower extremity pain bilateral, reports blue feet at times      8/9/22  Virtual visit  Ambulates with walker   Had a fall recently due to syncopal episode, went to urgent care, negative workup per patient  Syncopal event occurred while standing up  Last visit Lasix was increased, patient reports increased thirst and water intake  Has also been taking carvedilol, reports low blood pressure  Chest pain worsened after syncopal event, has bruising on her body      9/7/22  Reports leg swelling, left  Has been taking eliquis also   Restarted taking lasix 2 weeks ago  Recently started on lyrica   Still wearing cardiac monitor  Still having SOB and chest  discomfort  U/S arterial w/o significant stenosis   BP elevated, high at home  Lost 8 lbs since 9/1      10/12/22  Virtual visit  Doing well, still getting chest pain   Still has shortness of breath but has improved   Was able to go to a football game without any issues   DVT ultrasound without thrombus   Has been treated for gout, improvement of toe pain  Has been having intermittent blood pressure elevated readings at home      EKG 6/13/22 NSR, LAD, LVH, 93 bpm, qtc 455 ms  EKG 5/16/22 SB, incomplete RBBB, LVH, septal infarct, qtc 422 ms   Echo 2/28/22  The left ventricle is normal in size with concentric hypertrophy and normal systolic function.  The estimated ejection fraction is 60%.  Normal left ventricular diastolic function.  Normal right ventricular size with normal right ventricular systolic function.  Mild to moderate tricuspid regurgitation.  Normal central venous pressure (3 mmHg).  The estimated PA systolic pressure is 36 mmHg.       Echo 2019  CONCLUSIONS     1 - Mild left atrial enlargement.     2 - Concentric hypertrophy.     3 - No wall motion abnormalities.     4 - Normal left ventricular systolic function (EF 60-65%).     5 - Impaired LV relaxation, elevated LAP (grade 2 diastolic dysfunction).     6 - Normal right ventricular systolic function .     7 - The estimated PA systolic pressure is 36 mmHg.     8 - Mild tricuspid regurgitation.        Past Medical History:   Diagnosis Date    Chronic diastolic congestive heart failure 8/25/2020    Encounter for blood transfusion     History of repair of aneurysm of abdominal aorta using endovascular stent graft     DR Bonds    Hx of psychiatric care     Hypertension     Major depressive disorder, single episode, moderate with anxious distress 1/14/2020    Malignant neoplasm of upper-outer quadrant of right breast in female, estrogen receptor positive 3/14/2019    radiation    Psychiatric problem     Sleep apnea     Sleep difficulties     Stroke 2009     no residual defect    Therapy        Past Surgical History:   Procedure Laterality Date    APPENDECTOMY      AXILLARY NODE DISSECTION Right 2020    Procedure: LYMPHADENECTOMY, AXILLARY;  Surgeon: Artemio Starks MD;  Location: [facility];  Service: General;  Laterality: Right;    BIOPSY OF AXILLARY NODE Right 2021    Procedure: BIOPSY, LYMPH NODE, AXILLARY;  Surgeon: Artemio Sutton MD;  Location: [address];  Service: General;  Laterality: Right;    BREAST BIOPSY          BREAST LUMPECTOMY      2019     SECTION      x 1    OOPHORECTOMY      right     SENTINEL LYMPH NODE BIOPSY Right 2019    Procedure: BIOPSY, LYMPH NODE, SENTINEL;  Surgeon: Artemio Starks MD;  Location: [facility];  Service: General;  Laterality: Right;    splenic artery aneurysm repair      TONSILLECTOMY         Social History     Tobacco Use    Smoking status: Never    Smokeless tobacco: Never   Substance Use Topics    Alcohol use: No    Drug use: No       Family History   Problem Relation Age of Onset    Diabetes Maternal Grandmother     Diabetes Maternal Grandfather     Diabetes Mother     Hypertension Mother     Sickle cell anemia Daughter     Breast cancer Neg Hx     Colon cancer Neg Hx     Ovarian cancer Neg Hx          Review of Systems   Constitutional: Negative for decreased appetite, diaphoresis, fever, malaise/fatigue and night sweats.   HENT:  Negative for nosebleeds.    Eyes:  Negative for blurred vision and double vision.   Cardiovascular:  Positive for chest pain, dyspnea on exertion and leg swelling. Negative for claudication, irregular heartbeat, near-syncope, orthopnea, palpitations, paroxysmal nocturnal dyspnea and syncope.   Respiratory:  Positive for shortness of breath. Negative for cough, sleep disturbances due to breathing, snoring, sputum production and wheezing.    Endocrine: Negative for cold intolerance and polyuria.   Hematologic/Lymphatic: Does not bruise/bleed easily.   Skin:   Negative for rash.   Musculoskeletal:  Negative for back pain, falls, joint pain, joint swelling and neck pain.        Right chest breast and shoulder pain   Gastrointestinal:  Negative for abdominal pain, heartburn, nausea and vomiting.   Genitourinary:  Negative for dysuria, frequency and hematuria.   Neurological:  Negative for difficulty with concentration, dizziness, focal weakness, headaches, light-headedness, numbness, seizures and weakness.   Psychiatric/Behavioral:  Negative for depression, memory loss and substance abuse. The patient does not have insomnia.    Allergic/Immunologic: Negative for HIV exposure and hives.   There were no vitals filed for this visit.        Objective:   Physical Exam  Constitutional:       Comments: Mild distress.    HENT:      Head: Normocephalic.   Eyes:      Pupils: Pupils are equal, round, and reactive to light.   Neck:      Thyroid: No thyromegaly.      Vascular: Normal carotid pulses. No carotid bruit or JVD.   Cardiovascular:      Rate and Rhythm: Normal rate and regular rhythm. No extrasystoles are present.     Chest Wall: PMI is not displaced.      Pulses: Normal pulses.      Heart sounds: Normal heart sounds. No murmur heard.    No gallop. No S3 sounds.   Pulmonary:      Effort: No respiratory distress.      Breath sounds: Normal breath sounds. No stridor.   Abdominal:      General: Bowel sounds are normal.      Palpations: Abdomen is soft.      Tenderness: There is no abdominal tenderness. There is no rebound.   Musculoskeletal:         General: Normal range of motion.      Left lower leg: Edema present.      Comments: Tender to palpitation   Skin:     Findings: No rash.   Neurological:      Mental Status: She is alert and oriented to person, place, and time.   Psychiatric:         Behavior: Behavior normal.       Lab Results   Component Value Date    CHOL 229 (H) 04/18/2022    CHOL 221 (H) 10/19/2020    CHOL 231 (H) 08/25/2020     Lab Results   Component Value Date     HDL 45 04/18/2022    HDL 55 10/19/2020    HDL 49 08/25/2020     Lab Results   Component Value Date    LDLCALC 150.4 04/18/2022    LDLCALC 137.4 10/19/2020    LDLCALC 135.6 08/25/2020     Lab Results   Component Value Date    TRIG 168 (H) 04/18/2022    TRIG 143 10/19/2020    TRIG 232 (H) 08/25/2020     Lab Results   Component Value Date    CHOLHDL 19.7 (L) 04/18/2022    CHOLHDL 24.9 10/19/2020    CHOLHDL 21.2 08/25/2020       Chemistry        Component Value Date/Time     09/16/2022 1313    K 3.9 09/16/2022 1313     09/16/2022 1313    CO2 24 09/16/2022 1313    BUN 14 09/16/2022 1313    CREATININE 1.1 09/16/2022 1313     (H) 09/16/2022 1313        Component Value Date/Time    CALCIUM 9.2 09/16/2022 1313    ALKPHOS 116 09/16/2022 1313    AST 17 09/16/2022 1313    ALT 29 09/16/2022 1313    BILITOT 0.4 09/16/2022 1313    ESTGFRAFRICA 37 (A) 07/06/2022 1312    EGFRNONAA 32 (A) 07/06/2022 1312          Lab Results   Component Value Date    HGBA1C 5.9 (H) 05/18/2019     Lab Results   Component Value Date    TSH 1.460 10/18/2021     Lab Results   Component Value Date    INR 0.9 02/15/2022    INR 1.0 11/10/2020    INR 1.0 05/19/2019     Lab Results   Component Value Date    WBC 10.26 09/16/2022    HGB 11.3 (L) 09/16/2022    HCT 37.1 09/16/2022    MCV 79 (L) 09/16/2022     09/16/2022     BMP  Sodium   Date Value Ref Range Status   09/16/2022 144 136 - 145 mmol/L Final     Potassium   Date Value Ref Range Status   09/16/2022 3.9 3.5 - 5.1 mmol/L Final     Chloride   Date Value Ref Range Status   09/16/2022 108 95 - 110 mmol/L Final     CO2   Date Value Ref Range Status   09/16/2022 24 23 - 29 mmol/L Final     BUN   Date Value Ref Range Status   09/16/2022 14 8 - 23 mg/dL Final     Creatinine   Date Value Ref Range Status   09/16/2022 1.1 0.5 - 1.4 mg/dL Final     Calcium   Date Value Ref Range Status   09/16/2022 9.2 8.7 - 10.5 mg/dL Final     Anion Gap   Date Value Ref Range Status   09/16/2022 12  8 - 16 mmol/L Final     eGFR if    Date Value Ref Range Status   07/06/2022 37 (A) >60 mL/min/1.73 m^2 Final     eGFR if non    Date Value Ref Range Status   07/06/2022 32 (A) >60 mL/min/1.73 m^2 Final     Comment:     Calculation used to obtain the estimated glomerular filtration  rate (eGFR) is the CKD-EPI equation.        BNP  @LABRCNTIP(BNP,BNPTRIAGEBLO)@  @LABRCNTIP(troponini)@  CrCl cannot be calculated (Patient's most recent lab result is older than the maximum 7 days allowed.).  No results found in the last 24 hours.  No results found in the last 24 hours.  No results found in the last 24 hours.    Assessment:      1. Primary hypertension    2. Morbid obesity with BMI of 40.0-44.9, adult    3. Edema, unspecified type    4. Chronic diastolic congestive heart failure    5. History of CVA (cerebrovascular accident)    6. Hyperlipidemia, unspecified hyperlipidemia type    7. Malignant neoplasm of upper-outer quadrant of right breast in female, estrogen receptor positive    8. Recurrent pulmonary emboli    9. Right sided weakness    10. Right leg pain    11. Syncope and collapse    12. Abdominal aortic aneurysm (AAA) without rupture, unspecified part              Plan:     Left leg swelling/edema  Continue lasix 20 mg b.i.d.   venous ultrasound without DVT  Recently started on lyrica which can cause swelling     Chest pain/shortness of breath  Recurrent chest pain  Repeat CTA PE without evidence of PE, stable right-sided mass  Obtain Lexiscan, unable to walk on treadmill, uses a walker    Syncope- resolved   Possibly due to dehydration  14 day monitor- 7.27% PACs, essentially asymptomatic    Hypertension  continue carvedilol  Restart digital Medicine    History of right breast cancer  Enlarging lymph nodes that could be causing significant pain/discomfort  Follows with Hematology-Oncology    Diastolic heart failure  Echo with normal EF, mild-mod TR  BNP mildly elevated-continue  Lasix    Right leg pain  DVT ultrasound 10/22 without evidence of DVT   Normal ABIs  Arterial ultrasound 8/22 without significant stenosis    Recurrent pulmonary embolus   Recurrent PE as of 2/22  Continue OAC    History of CVA  Carotid ultrasound no significant stenosis, recent MRI brain no abnormality  Currently not on aspirin as she is taking Eliquis  Continue statin    HLD   as of 4/22  Continue statin    AAA s/p transcutaneous patch  Stable    Morbid obesity, BMI > 40  Low-salt, low-fat diet  Exercise as tolerated      All labs and cardiac procedures reviewed.      Return to clinic 2-3 months    Arlene Hobbs MD  Cardiology Staff

## 2022-10-18 ENCOUNTER — TELEPHONE (OUTPATIENT)
Dept: CARDIOLOGY | Facility: HOSPITAL | Age: 72
End: 2022-10-18
Payer: MEDICARE

## 2022-10-26 ENCOUNTER — HOSPITAL ENCOUNTER (OUTPATIENT)
Dept: RADIOLOGY | Facility: HOSPITAL | Age: 72
Discharge: HOME | End: 2022-10-26
Attending: FAMILY MEDICINE
Payer: MEDICARE

## 2022-10-26 VITALS — BODY MASS INDEX: 39.68 KG/M2 | WEIGHT: 293 LBS | HEIGHT: 72 IN

## 2022-10-26 DIAGNOSIS — Z12.31 SCREENING MAMMOGRAM FOR BREAST CANCER: ICD-10-CM

## 2022-10-26 DIAGNOSIS — N63.0 BREAST LUMP: ICD-10-CM

## 2022-10-26 PROCEDURE — 76642 US BREAST RIGHT LIMITED: ICD-10-PCS | Mod: 26,RT,, | Performed by: RADIOLOGY

## 2022-10-26 PROCEDURE — 77062 MAMMO DIGITAL DIAGNOSTIC BILAT WITH TOMO: ICD-10-PCS | Mod: 26,,, | Performed by: RADIOLOGY

## 2022-10-26 PROCEDURE — 77062 BREAST TOMOSYNTHESIS BI: CPT | Mod: 26,,, | Performed by: RADIOLOGY

## 2022-10-26 PROCEDURE — 77066 DX MAMMO INCL CAD BI: CPT | Mod: 26,,, | Performed by: RADIOLOGY

## 2022-10-26 PROCEDURE — 77066 DX MAMMO INCL CAD BI: CPT | Mod: TC

## 2022-10-26 PROCEDURE — 77066 MAMMO DIGITAL DIAGNOSTIC BILAT WITH TOMO: ICD-10-PCS | Mod: 26,,, | Performed by: RADIOLOGY

## 2022-10-26 PROCEDURE — 76642 ULTRASOUND BREAST LIMITED: CPT | Mod: TC,RT

## 2022-10-26 PROCEDURE — 76642 ULTRASOUND BREAST LIMITED: CPT | Mod: 26,RT,, | Performed by: RADIOLOGY

## 2022-11-07 ENCOUNTER — PATIENT MESSAGE (OUTPATIENT)
Dept: CARDIOLOGY | Facility: CLINIC | Age: 72
End: 2022-11-07
Payer: MEDICARE

## 2022-11-08 NOTE — TELEPHONE ENCOUNTER
Reached out to pt no answer- left message. August 9th holter has been returned and finalized by Dr. Lobo and reviewed with comments from

## 2022-11-11 ENCOUNTER — OFFICE VISIT (OUTPATIENT)
Dept: INTERNAL MEDICINE | Facility: CLINIC | Age: 72
End: 2022-11-11
Payer: MEDICARE

## 2022-11-11 VITALS
BODY MASS INDEX: 39.68 KG/M2 | DIASTOLIC BLOOD PRESSURE: 88 MMHG | WEIGHT: 293 LBS | HEART RATE: 65 BPM | SYSTOLIC BLOOD PRESSURE: 130 MMHG | RESPIRATION RATE: 18 BRPM | HEIGHT: 72 IN | OXYGEN SATURATION: 95 %

## 2022-11-11 DIAGNOSIS — Z86.711 HISTORY OF PULMONARY EMBOLISM: ICD-10-CM

## 2022-11-11 DIAGNOSIS — E66.01 MORBID OBESITY WITH BMI OF 45.0-49.9, ADULT: ICD-10-CM

## 2022-11-11 DIAGNOSIS — F32.1 MAJOR DEPRESSIVE DISORDER, SINGLE EPISODE, MODERATE WITH ANXIOUS DISTRESS: ICD-10-CM

## 2022-11-11 DIAGNOSIS — I26.99 ACUTE PULMONARY EMBOLISM, UNSPECIFIED PULMONARY EMBOLISM TYPE, UNSPECIFIED WHETHER ACUTE COR PULMONALE PRESENT: ICD-10-CM

## 2022-11-11 DIAGNOSIS — C50.411 MALIGNANT NEOPLASM OF UPPER-OUTER QUADRANT OF RIGHT BREAST IN FEMALE, ESTROGEN RECEPTOR POSITIVE: ICD-10-CM

## 2022-11-11 DIAGNOSIS — R68.89 OTHER GENERAL SYMPTOMS AND SIGNS: ICD-10-CM

## 2022-11-11 DIAGNOSIS — G62.9 NEUROPATHY: ICD-10-CM

## 2022-11-11 DIAGNOSIS — G89.3 NEOPLASM RELATED PAIN: ICD-10-CM

## 2022-11-11 DIAGNOSIS — F54 PSYCHOLOGICAL FACTORS AFFECTING MEDICAL CONDITION: ICD-10-CM

## 2022-11-11 DIAGNOSIS — Z17.0 MALIGNANT NEOPLASM OF UPPER-OUTER QUADRANT OF RIGHT BREAST IN FEMALE, ESTROGEN RECEPTOR POSITIVE: ICD-10-CM

## 2022-11-11 DIAGNOSIS — R13.10 DYSPHAGIA, UNSPECIFIED TYPE: Primary | ICD-10-CM

## 2022-11-11 PROCEDURE — 1157F PR ADVANCE CARE PLAN OR EQUIV PRESENT IN MEDICAL RECORD: ICD-10-PCS | Mod: CPTII,S$GLB,, | Performed by: FAMILY MEDICINE

## 2022-11-11 PROCEDURE — 99214 OFFICE O/P EST MOD 30 MIN: CPT | Mod: S$GLB,,, | Performed by: FAMILY MEDICINE

## 2022-11-11 PROCEDURE — 3008F PR BODY MASS INDEX (BMI) DOCUMENTED: ICD-10-PCS | Mod: CPTII,S$GLB,, | Performed by: FAMILY MEDICINE

## 2022-11-11 PROCEDURE — 1125F PR PAIN SEVERITY QUANTIFIED, PAIN PRESENT: ICD-10-PCS | Mod: CPTII,S$GLB,, | Performed by: FAMILY MEDICINE

## 2022-11-11 PROCEDURE — 99214 PR OFFICE/OUTPT VISIT, EST, LEVL IV, 30-39 MIN: ICD-10-PCS | Mod: S$GLB,,, | Performed by: FAMILY MEDICINE

## 2022-11-11 PROCEDURE — 1157F ADVNC CARE PLAN IN RCRD: CPT | Mod: CPTII,S$GLB,, | Performed by: FAMILY MEDICINE

## 2022-11-11 PROCEDURE — 3075F PR MOST RECENT SYSTOLIC BLOOD PRESS GE 130-139MM HG: ICD-10-PCS | Mod: CPTII,S$GLB,, | Performed by: FAMILY MEDICINE

## 2022-11-11 PROCEDURE — 1100F PR PT FALLS ASSESS DOC 2+ FALLS/FALL W/INJURY/YR: ICD-10-PCS | Mod: CPTII,S$GLB,, | Performed by: FAMILY MEDICINE

## 2022-11-11 PROCEDURE — 4010F ACE/ARB THERAPY RXD/TAKEN: CPT | Mod: CPTII,S$GLB,, | Performed by: FAMILY MEDICINE

## 2022-11-11 PROCEDURE — 1125F AMNT PAIN NOTED PAIN PRSNT: CPT | Mod: CPTII,S$GLB,, | Performed by: FAMILY MEDICINE

## 2022-11-11 PROCEDURE — 99999 PR PBB SHADOW E&M-EST. PATIENT-LVL IV: CPT | Mod: PBBFAC,,, | Performed by: FAMILY MEDICINE

## 2022-11-11 PROCEDURE — 3288F PR FALLS RISK ASSESSMENT DOCUMENTED: ICD-10-PCS | Mod: CPTII,S$GLB,, | Performed by: FAMILY MEDICINE

## 2022-11-11 PROCEDURE — 4010F PR ACE/ARB THEARPY RXD/TAKEN: ICD-10-PCS | Mod: CPTII,S$GLB,, | Performed by: FAMILY MEDICINE

## 2022-11-11 PROCEDURE — 1159F PR MEDICATION LIST DOCUMENTED IN MEDICAL RECORD: ICD-10-PCS | Mod: CPTII,S$GLB,, | Performed by: FAMILY MEDICINE

## 2022-11-11 PROCEDURE — 3079F PR MOST RECENT DIASTOLIC BLOOD PRESSURE 80-89 MM HG: ICD-10-PCS | Mod: CPTII,S$GLB,, | Performed by: FAMILY MEDICINE

## 2022-11-11 PROCEDURE — 3079F DIAST BP 80-89 MM HG: CPT | Mod: CPTII,S$GLB,, | Performed by: FAMILY MEDICINE

## 2022-11-11 PROCEDURE — 1159F MED LIST DOCD IN RCRD: CPT | Mod: CPTII,S$GLB,, | Performed by: FAMILY MEDICINE

## 2022-11-11 PROCEDURE — 3288F FALL RISK ASSESSMENT DOCD: CPT | Mod: CPTII,S$GLB,, | Performed by: FAMILY MEDICINE

## 2022-11-11 PROCEDURE — 3075F SYST BP GE 130 - 139MM HG: CPT | Mod: CPTII,S$GLB,, | Performed by: FAMILY MEDICINE

## 2022-11-11 PROCEDURE — 3008F BODY MASS INDEX DOCD: CPT | Mod: CPTII,S$GLB,, | Performed by: FAMILY MEDICINE

## 2022-11-11 PROCEDURE — 1100F PTFALLS ASSESS-DOCD GE2>/YR: CPT | Mod: CPTII,S$GLB,, | Performed by: FAMILY MEDICINE

## 2022-11-11 PROCEDURE — 99999 PR PBB SHADOW E&M-EST. PATIENT-LVL IV: ICD-10-PCS | Mod: PBBFAC,,, | Performed by: FAMILY MEDICINE

## 2022-11-11 RX ORDER — OXYCODONE AND ACETAMINOPHEN 7.5; 325 MG/1; MG/1
1 TABLET ORAL EVERY 6 HOURS PRN
Qty: 60 TABLET | Refills: 0 | Status: SHIPPED | OUTPATIENT
Start: 2022-11-11 | End: 2022-12-29 | Stop reason: SDUPTHER

## 2022-11-11 RX ORDER — PREGABALIN 75 MG/1
75 CAPSULE ORAL 3 TIMES DAILY
Qty: 90 CAPSULE | Refills: 2 | Status: SHIPPED | OUTPATIENT
Start: 2022-11-11 | End: 2022-12-29 | Stop reason: SDUPTHER

## 2022-11-11 RX ORDER — ALPRAZOLAM 0.5 MG/1
0.5 TABLET ORAL 2 TIMES DAILY PRN
Qty: 180 TABLET | Refills: 0 | Status: SHIPPED | OUTPATIENT
Start: 2022-11-11 | End: 2023-01-11 | Stop reason: SDUPTHER

## 2022-11-11 RX ORDER — ATORVASTATIN CALCIUM 20 MG/1
20 TABLET, FILM COATED ORAL DAILY
Qty: 90 TABLET | Refills: 0 | Status: SHIPPED | OUTPATIENT
Start: 2022-11-11 | End: 2023-02-01

## 2022-11-11 NOTE — PROGRESS NOTES
Subjective:       Patient ID: Susu Luther is a 72 y.o. female.    Chief Complaint: Breast Pain, Neck Pain, Dizziness, and Fall      Ms. Luther presents today for neck and breast pain. She also has been having dizziness and falls.   Neck Pain   Pertinent negatives include no chest pain, fever, headaches, numbness or weakness.   Dizziness: no ear pain, no fever, no headaches, no nausea, no vomiting, no weakness, no palpitations and no chest pain.  Fall  Pertinent negatives include no abdominal pain, fever, headaches, hematuria, nausea, numbness or vomiting.      Breast pain-mammogram  negative     Neck pain    Dizziness     Fall -Tuesday   Dizzy   Everytime she takes     Difficulty swallowing food and liquid       Review of Systems   Constitutional:  Negative for activity change, appetite change, fatigue and fever.   HENT:  Negative for congestion, ear pain and sinus pressure.    Eyes:  Negative for pain and visual disturbance.   Respiratory:  Negative for cough, chest tightness and wheezing.    Cardiovascular:  Negative for chest pain, palpitations and leg swelling.   Gastrointestinal:  Negative for abdominal distention, abdominal pain, constipation, diarrhea, nausea and vomiting.   Endocrine: Negative for polydipsia and polyuria.   Genitourinary:  Negative for difficulty urinating, dyspareunia, dysuria, flank pain and hematuria.   Musculoskeletal:  Positive for neck pain. Negative for arthralgias and back pain.   Skin:  Negative for rash.   Neurological:  Positive for dizziness. Negative for tremors, syncope, weakness, numbness and headaches.   Psychiatric/Behavioral:  Negative for agitation and confusion.          Past Medical History:   Diagnosis Date    Chronic diastolic congestive heart failure 8/25/2020    Encounter for blood transfusion     History of repair of aneurysm of abdominal aorta using endovascular stent graft     DR Bonds     of psychiatric care     Hypertension     Major depressive  disorder, single episode, moderate with anxious distress 2020    Malignant neoplasm of upper-outer quadrant of right breast in female, estrogen receptor positive 3/14/2019    radiation    Psychiatric problem     Sleep apnea     Sleep difficulties     Stroke 2009    no residual defect    Therapy      Past Surgical History:   Procedure Laterality Date    APPENDECTOMY      AXILLARY NODE DISSECTION Right 2020    Procedure: LYMPHADENECTOMY, AXILLARY;  Surgeon: Artemio Starks MD;  Location: [facility];  Service: General;  Laterality: Right;    BIOPSY OF AXILLARY NODE Right 2021    Procedure: BIOPSY, LYMPH NODE, AXILLARY;  Surgeon: Artemio Sutton MD;  Location: [address];  Service: General;  Laterality: Right;    BREAST BIOPSY      2019    BREAST LUMPECTOMY      2019     SECTION      x 1    OOPHORECTOMY      right     SENTINEL LYMPH NODE BIOPSY Right 2019    Procedure: BIOPSY, LYMPH NODE, SENTINEL;  Surgeon: Artemio Starks MD;  Location: [facility];  Service: General;  Laterality: Right;    splenic artery aneurysm repair      TONSILLECTOMY       Family History   Problem Relation Age of Onset    Diabetes Maternal Grandmother     Diabetes Maternal Grandfather     Diabetes Mother     Hypertension Mother     Sickle cell anemia Daughter     Breast cancer Neg Hx     Colon cancer Neg Hx     Ovarian cancer Neg Hx            Objective:        Physical Exam  Vitals reviewed.   Constitutional:       Appearance: Normal appearance. She is obese.   HENT:      Head: Normocephalic and atraumatic.   Cardiovascular:      Rate and Rhythm: Normal rate.      Heart sounds: Murmur heard.   Musculoskeletal:         General: Tenderness present.      Cervical back: Normal range of motion.   Neurological:      Mental Status: She is alert and oriented to person, place, and time.             Assessment/Plan:     Dysphagia, unspecified type  -     Fl Modified Barium Swallow Speech; Future; Expected date:  11/11/2022  -     SLP video swallow; Future; Expected date: 11/11/2022    Other general symptoms and signs  -     Fl Modified Barium Swallow Speech; Future; Expected date: 11/11/2022    Neoplasm related pain  -     oxyCODONE-acetaminophen (PERCOCET) 7.5-325 mg per tablet; Take 1 tablet by mouth every 6 (six) hours as needed for Pain.  Dispense: 60 tablet; Refill: 0    Acute pulmonary embolism, unspecified pulmonary embolism type, unspecified whether acute cor pulmonale present  -     apixaban (ELIQUIS) 5 mg Tab; Take 1 tablet (5 mg total) by mouth 2 (two) times daily.  Dispense: 180 tablet; Refill: 3    Malignant neoplasm of upper-outer quadrant of right breast in female, estrogen receptor positive  -     pregabalin (LYRICA) 75 MG capsule; Take 1 capsule (75 mg total) by mouth 3 (three) times daily.  Dispense: 90 capsule; Refill: 2    History of pulmonary embolism  -     pregabalin (LYRICA) 75 MG capsule; Take 1 capsule (75 mg total) by mouth 3 (three) times daily.  Dispense: 90 capsule; Refill: 2    Morbid obesity with BMI of 45.0-49.9, adult  -     pregabalin (LYRICA) 75 MG capsule; Take 1 capsule (75 mg total) by mouth 3 (three) times daily.  Dispense: 90 capsule; Refill: 2    Neuropathy  -     pregabalin (LYRICA) 75 MG capsule; Take 1 capsule (75 mg total) by mouth 3 (three) times daily.  Dispense: 90 capsule; Refill: 2    Major depressive disorder, single episode, moderate with anxious distress  -     ALPRAZolam (XANAX) 0.5 MG tablet; Take 1 tablet (0.5 mg total) by mouth 2 (two) times daily as needed for Anxiety.  Dispense: 180 tablet; Refill: 0    Psychological factors affecting medical condition  -     ALPRAZolam (XANAX) 0.5 MG tablet; Take 1 tablet (0.5 mg total) by mouth 2 (two) times daily as needed for Anxiety.  Dispense: 180 tablet; Refill: 0    Other orders  -     atorvastatin (LIPITOR) 20 MG tablet; Take 1 tablet (20 mg total) by mouth once daily.  Dispense: 90 tablet; Refill: 0        Follow up 2  mnths     Peace Arias MD  [facility]

## 2022-11-15 ENCOUNTER — PATIENT MESSAGE (OUTPATIENT)
Dept: CARDIOLOGY | Facility: CLINIC | Age: 72
End: 2022-11-15
Payer: MEDICARE

## 2022-11-15 ENCOUNTER — TELEPHONE (OUTPATIENT)
Dept: CARDIOLOGY | Facility: CLINIC | Age: 72
End: 2022-11-15
Payer: MEDICARE

## 2022-11-15 NOTE — TELEPHONE ENCOUNTER
Lvm for pt to return call to clinic in regards to recent refill request for Hydralazine, called to clarify RX dosage and frequency.

## 2022-11-17 ENCOUNTER — PATIENT MESSAGE (OUTPATIENT)
Dept: OPHTHALMOLOGY | Facility: CLINIC | Age: 72
End: 2022-11-17
Payer: MEDICARE

## 2022-11-21 ENCOUNTER — HOSPITAL ENCOUNTER (OUTPATIENT)
Dept: RADIOLOGY | Facility: HOSPITAL | Age: 72
Discharge: HOME | End: 2022-11-21
Attending: STUDENT IN AN ORGANIZED HEALTH CARE EDUCATION/TRAINING PROGRAM
Payer: MEDICARE

## 2022-11-21 DIAGNOSIS — L30.9 DERMATITIS: ICD-10-CM

## 2022-11-21 PROCEDURE — 76604 US SOFT TISSUE CHEST_UPPER BACK: ICD-10-PCS | Mod: 26,,, | Performed by: RADIOLOGY

## 2022-11-21 PROCEDURE — 76604 US EXAM CHEST: CPT | Mod: 26,,, | Performed by: RADIOLOGY

## 2022-11-21 PROCEDURE — 76604 US EXAM CHEST: CPT | Mod: TC

## 2022-11-26 DIAGNOSIS — N95.1 HOT FLASHES DUE TO MENOPAUSE: ICD-10-CM

## 2022-11-28 ENCOUNTER — PATIENT MESSAGE (OUTPATIENT)
Dept: INTERNAL MEDICINE | Facility: CLINIC | Age: 72
End: 2022-11-28
Payer: MEDICARE

## 2022-11-28 RX ORDER — BUTALBITAL, ACETAMINOPHEN AND CAFFEINE 50; 325; 40 MG/1; MG/1; MG/1
1 TABLET ORAL DAILY PRN
Qty: 70 TABLET | Refills: 1 | Status: SHIPPED | OUTPATIENT
Start: 2022-11-28 | End: 2022-11-29 | Stop reason: SDUPTHER

## 2022-11-28 NOTE — TELEPHONE ENCOUNTER
No new care gaps identified.  [facility] Embedded Care Gaps. Reference number: [id number]. 11/28/2022   2:14:19 PM CST

## 2022-11-29 ENCOUNTER — PATIENT MESSAGE (OUTPATIENT)
Dept: INTERNAL MEDICINE | Facility: CLINIC | Age: 72
End: 2022-11-29
Payer: MEDICARE

## 2022-11-29 RX ORDER — BUTALBITAL, ACETAMINOPHEN AND CAFFEINE 50; 325; 40 MG/1; MG/1; MG/1
1 TABLET ORAL EVERY 6 HOURS PRN
Qty: 70 TABLET | Refills: 1 | Status: SHIPPED | OUTPATIENT
Start: 2022-11-29 | End: 2023-01-11 | Stop reason: SDUPTHER

## 2022-11-29 RX ORDER — VENLAFAXINE HYDROCHLORIDE 37.5 MG/1
37.5 CAPSULE, EXTENDED RELEASE ORAL 2 TIMES DAILY
Qty: 180 CAPSULE | Refills: 0 | OUTPATIENT
Start: 2022-11-29 | End: 2023-02-27

## 2022-12-05 ENCOUNTER — PATIENT MESSAGE (OUTPATIENT)
Dept: HEMATOLOGY/ONCOLOGY | Facility: CLINIC | Age: 72
End: 2022-12-05
Payer: MEDICARE

## 2022-12-20 ENCOUNTER — TELEPHONE (OUTPATIENT)
Dept: CARDIOLOGY | Facility: HOSPITAL | Age: 72
End: 2022-12-20
Payer: MEDICARE

## 2022-12-20 NOTE — TELEPHONE ENCOUNTER
----- Message from Hipolito Sylvester sent at 12/20/2022  1:01 PM CST -----  Pt would like to reschedule stress test.  Please call back at [phone].  Md David       6

## 2022-12-21 ENCOUNTER — PATIENT MESSAGE (OUTPATIENT)
Dept: INTERNAL MEDICINE | Facility: CLINIC | Age: 72
End: 2022-12-21
Payer: MEDICARE

## 2022-12-21 DIAGNOSIS — M79.604 BILATERAL LEG PAIN: ICD-10-CM

## 2022-12-21 DIAGNOSIS — M79.605 BILATERAL LEG PAIN: ICD-10-CM

## 2022-12-21 RX ORDER — DICLOFENAC SODIUM 10 MG/G
2 GEL TOPICAL DAILY
Qty: 450 G | Refills: 1 | Status: SHIPPED | OUTPATIENT
Start: 2022-12-21 | End: 2023-01-11 | Stop reason: SDUPTHER

## 2022-12-29 ENCOUNTER — PATIENT MESSAGE (OUTPATIENT)
Dept: CARDIOLOGY | Facility: CLINIC | Age: 72
End: 2022-12-29
Payer: MEDICARE

## 2022-12-29 ENCOUNTER — PATIENT MESSAGE (OUTPATIENT)
Dept: INTERNAL MEDICINE | Facility: CLINIC | Age: 72
End: 2022-12-29
Payer: MEDICARE

## 2022-12-29 DIAGNOSIS — G89.3 NEOPLASM RELATED PAIN: ICD-10-CM

## 2022-12-29 RX ORDER — OXYCODONE AND ACETAMINOPHEN 7.5; 325 MG/1; MG/1
1 TABLET ORAL EVERY 6 HOURS PRN
Qty: 60 TABLET | Refills: 0 | Status: SHIPPED | OUTPATIENT
Start: 2022-12-29 | End: 2023-03-17 | Stop reason: SDUPTHER

## 2022-12-29 NOTE — TELEPHONE ENCOUNTER
Refill Routing Note   Medication(s) are not appropriate for processing by [facility] for the following reason(s):      - Outside of protocol    [facility] action(s):  Route          Medication reconciliation completed: No     Appointments  past 12m or future 3m with PCP    Date Provider   Last Visit   11/11/2022 Peace Arias MD   Next Visit   12/29/2022 Peace Arias MD   ED visits in past 90 days: 0        Note composed:11:01 AM 12/29/2022

## 2022-12-29 NOTE — TELEPHONE ENCOUNTER
No new care gaps identified.  [facility] Embedded Care Gaps. Reference number: [id number]. 12/29/2022   9:56:53 AM CST

## 2023-01-03 ENCOUNTER — TELEPHONE (OUTPATIENT)
Dept: CARDIOLOGY | Facility: HOSPITAL | Age: 73
End: 2023-01-03
Payer: MEDICARE

## 2023-01-09 ENCOUNTER — HOSPITAL ENCOUNTER (OUTPATIENT)
Dept: CARDIOLOGY | Facility: HOSPITAL | Age: 73
Discharge: HOME | End: 2023-01-09
Attending: STUDENT IN AN ORGANIZED HEALTH CARE EDUCATION/TRAINING PROGRAM
Payer: MEDICARE

## 2023-01-09 ENCOUNTER — HOSPITAL ENCOUNTER (OUTPATIENT)
Dept: RADIOLOGY | Facility: HOSPITAL | Age: 73
Discharge: HOME | End: 2023-01-09
Attending: STUDENT IN AN ORGANIZED HEALTH CARE EDUCATION/TRAINING PROGRAM
Payer: MEDICARE

## 2023-01-09 DIAGNOSIS — Z17.0 MALIGNANT NEOPLASM OF UPPER-OUTER QUADRANT OF RIGHT BREAST IN FEMALE, ESTROGEN RECEPTOR POSITIVE: ICD-10-CM

## 2023-01-09 DIAGNOSIS — Z86.73 HISTORY OF CVA (CEREBROVASCULAR ACCIDENT): ICD-10-CM

## 2023-01-09 DIAGNOSIS — I71.40 ABDOMINAL AORTIC ANEURYSM (AAA) WITHOUT RUPTURE, UNSPECIFIED PART: ICD-10-CM

## 2023-01-09 DIAGNOSIS — R55 SYNCOPE AND COLLAPSE: ICD-10-CM

## 2023-01-09 DIAGNOSIS — M79.604 RIGHT LEG PAIN: ICD-10-CM

## 2023-01-09 DIAGNOSIS — R07.9 CHEST PAIN, UNSPECIFIED TYPE: ICD-10-CM

## 2023-01-09 DIAGNOSIS — C50.411 MALIGNANT NEOPLASM OF UPPER-OUTER QUADRANT OF RIGHT BREAST IN FEMALE, ESTROGEN RECEPTOR POSITIVE: ICD-10-CM

## 2023-01-09 DIAGNOSIS — I26.99 RECURRENT PULMONARY EMBOLI: ICD-10-CM

## 2023-01-09 DIAGNOSIS — E78.5 HYPERLIPIDEMIA, UNSPECIFIED HYPERLIPIDEMIA TYPE: ICD-10-CM

## 2023-01-09 DIAGNOSIS — I10 PRIMARY HYPERTENSION: ICD-10-CM

## 2023-01-09 DIAGNOSIS — I50.32 CHRONIC DIASTOLIC CONGESTIVE HEART FAILURE: ICD-10-CM

## 2023-01-09 DIAGNOSIS — R60.9 EDEMA, UNSPECIFIED TYPE: ICD-10-CM

## 2023-01-09 DIAGNOSIS — R53.1 RIGHT SIDED WEAKNESS: ICD-10-CM

## 2023-01-09 DIAGNOSIS — E66.01 MORBID OBESITY WITH BMI OF 40.0-44.9, ADULT: ICD-10-CM

## 2023-01-09 PROCEDURE — 63600175 PHARM REV CODE 636 W HCPCS: Mod: HCNC | Performed by: STUDENT IN AN ORGANIZED HEALTH CARE EDUCATION/TRAINING PROGRAM

## 2023-01-09 PROCEDURE — 93016 NUCLEAR STRESS - CARDIOLOGY INTERPRETED (CUPID ONLY): ICD-10-PCS | Mod: HCNC,,, | Performed by: INTERNAL MEDICINE

## 2023-01-09 PROCEDURE — 78452 HT MUSCLE IMAGE SPECT MULT: CPT | Mod: 26,HCNC,, | Performed by: INTERNAL MEDICINE

## 2023-01-09 PROCEDURE — 93016 CV STRESS TEST SUPVJ ONLY: CPT | Mod: HCNC,,, | Performed by: INTERNAL MEDICINE

## 2023-01-09 PROCEDURE — 78452 NUCLEAR STRESS - CARDIOLOGY INTERPRETED (CUPID ONLY): ICD-10-PCS | Mod: 26,HCNC,, | Performed by: INTERNAL MEDICINE

## 2023-01-09 PROCEDURE — 78452 HT MUSCLE IMAGE SPECT MULT: CPT | Mod: HCNC

## 2023-01-09 PROCEDURE — A9502 TC99M TETROFOSMIN: HCPCS | Mod: HCNC

## 2023-01-09 PROCEDURE — 93018 NUCLEAR STRESS - CARDIOLOGY INTERPRETED (CUPID ONLY): ICD-10-PCS | Mod: HCNC,,, | Performed by: INTERNAL MEDICINE

## 2023-01-09 PROCEDURE — 93018 CV STRESS TEST I&R ONLY: CPT | Mod: HCNC,,, | Performed by: INTERNAL MEDICINE

## 2023-01-09 PROCEDURE — 93017 CV STRESS TEST TRACING ONLY: CPT | Mod: HCNC

## 2023-01-09 RX ORDER — REGADENOSON 0.08 MG/ML
0.4 INJECTION, SOLUTION INTRAVENOUS ONCE
Status: COMPLETED | OUTPATIENT
Start: 2023-01-09 | End: 2023-01-09

## 2023-01-09 RX ADMIN — REGADENOSON 0.4 MG: 0.08 INJECTION, SOLUTION INTRAVENOUS at 01:01

## 2023-01-09 NOTE — ASSESSMENT & PLAN NOTE
72-year-old female with stage IIA (pT2, pN0(sn), cM0, G2, ER+, DC-, HER2-) invasive lobular carcinoma of right breast, status post lumpectomy with sentinel lymph node biopsy in 2019.  Currently on Femara and tolerating well.  DEXA scan ordered.  Yet to be obtained.     Unfortunately, imaging studies have shown persistently enlarged right subpectoral lymph node as well as FDG avid left posterior cervical and supraclavicular lymph nodes.  Multiple biopsies in the past have been nondiagnostic for malignancy.  Case was presented at multi-disciplinary tumor conference with recommendation to continue Arimidex.  Patient was not satisfied with recommendation a requested for 2nd opinion.     She was evaluated by Dr. Sutton in [place] who recommended MRI of breast.     MRI was performed on 02/23/2021 and showed lymph node mass deep to the right pectoralis minor muscle measuring 6.6 x 4.7 x 2.9 cm. There were also adjacent satellite lymph nodes anteriorly to the dominant lymph node measured 1.1 x 0.8 cm and 0.6 x 0.4 cm.  Also described was a mass effect upon the right subclavian vein.  The mass does not in case or envelope the subclavian neurovascular structures.     Given the above findings, recommendation was made to excise the mass if not for diagnostic purposes for pain management.  She underwent lymph node excision on 03/01/2021, pathology returned back as lymph node with follicular hyperplasia. No evidence of metastatic carcinoma or lymphoma.     She was continued on Femara with plan for short interval PET scan.     Patient had a PET scan on 4/14/2021 that showed interim right axillary lymph node excision with large residual hypermetabolic right subpectoral lymph node mass.  Also noted interval development of subcentimeter mildly FDG avid left posterior cervical and supraclavicular lymph nodes. No FDG avid mediastinal or hilar nodes.No FDG avid pulmonary nodules/masses.      Had diagnostic bilateral mammography on  9/24/2021, results showed a left breast subcentimeter mass at the 9 o'clock position which is new when compared to mammography from 2019.  Will plan a short-interval mammogram in 6 months to re-assess left breast mass.There was no mammographic evidence of disease in the right breast.     Saw Dr. Sutton in [place] who recommended a repeat PET-CT scan. Results from PET-CT scan showed interval enlargement of subpectoral mass on the right with sustained highly FDG avidity.     Me was she continues to complain of shoulder pain and breast discomfort.  Most recently CT neck chest with contrast was obtained on 03/17/2022, when compared to PET scan from October of 2021, the right subpectoral lymph node has not changed and still measures about 6 cm maximally.     Given continuing discomfort and pain associated with these enlarged lymph nodes.  She was referred to surgical oncologist following extensive conversation, it appears the risk of repeat surgical resection outweighs the benefits.  Decision was made to optimize neuropathic pain management.     Lyrica was initiated for neuropathic pain control. Continue Femara and pain management.

## 2023-01-09 NOTE — PROGRESS NOTES
Subjective:       Patient ID: Susu Luther is a 73 y.o. female.    Chief Complaint:   1. Malignant neoplasm of upper-outer quadrant of right breast in female, estrogen receptor positive  Stage IIA (pT2, pN0(sn), cM0, G2, ER+, NV-, HER2-) invasive lobular carcinoma of right breast      2. Acute pulmonary embolism, unspecified pulmonary embolism type, unspecified whether acute cor pulmonale present          Current Treatment:  Femara 2.5mg po daily      Eliquis 5mg po BID    Treatment History:  S/p lumpectomy & sentinel LN biopsy, right, Dr. Starks, 2/28/2019        XRT from 5/20/2019 to 6/18/2019    HPI: This is a 73-year-old obese  female with medical history significant for diastolic CHF HTN, major depressive disorder, sleep apnea, CVA, AAA repair, and Stage IIA (pT2, pN0(sn), cM0, G2, ER+, NV-, HER2-) invasive lobular carcinoma of right breast, status post lumpectomy with sentinel lymph node biopsy in 2019.  She has since been on aromatase inhibitor and has been tolerating well.     Restaging PET-CT scan performed on 11/4/2020 revealed large hypermetabolic right subpectoral lymph node mass measuring 6.9 x 3.2 cm with SUV max of 13.0.     Right chest wall lymph node biopsy performed on 11/10/2020 revealed fragments of benign lymph node with no evidence of malignancy.  Excisional biopsy of right subpectoral lymph node was again performed on 12/2/2020 and returned as benign with no evidence of metastatic disease.     Case was discussed at the tumor conference and recommendation was to continue aromatase inhibitor with plan for repeat short interval imaging to reassess the right subpectoral lymph node mass.  This was discussed with patient, however, she was not satisfied with the recommendation and wanted a 2nd opinion with a different oncologist.     She was referred to breast surgeon in [facility]. She had MRI and has a planned surgical resection on 3/1/2021. Status post lymph node excision on  3/1/2021.    Interval History: Patient presents for follow up on Femara. She presents alone and reports resolution of the bruises on her thighs; she has a light bruise on her left hand. She reports adherence to Femara and Eliquis and reports hot flashes and mild joint pains. She reports adherence to Lyrica and states it is not effective for right arm neuropathy. She admits that she has not been taking it as prescribed; she has been taking it BID. Advised her to try the TID dosing and re-evaluate it.    Surveillance MMG from 10/2022 was BIRADS 2; recommend repeat in 1 year.     Reviewed labs with patient:   CBC:   Recent Labs   Lab 01/11/23  1102   WBC 8.34   RBC 4.72   Hemoglobin 11.5 L   Hematocrit 37.4   Platelets 257   MCV 79 L   MCH 24.4 L   MCHC 30.7 L     CMP:  Recent Labs   Lab 01/11/23  1102   Glucose 104   Calcium 9.5   Albumin 3.2 L   Total Protein 7.2   Sodium 142   Potassium 4.0   CO2 24   Chloride 109   BUN 20   Creatinine 1.2   Alkaline Phosphatase 124   ALT 14   AST 15   Total Bilirubin 0.4        Social History     Socioeconomic History    Marital status:      Spouse name: Campos Luther    Number of children: 2   Tobacco Use    Smoking status: Never    Smokeless tobacco: Never   Substance and Sexual Activity    Alcohol use: No    Drug use: No    Sexual activity: Yes     Partners: Male     Birth control/protection: Post-menopausal     Social Determinants of Health     Financial Resource Strain: Medium Risk    Difficulty of Paying Living Expenses: Somewhat hard   Food Insecurity: No Food Insecurity    Worried About Running Out of Food in the Last Year: Never true    Ran Out of Food in the Last Year: Never true   Transportation Needs: No Transportation Needs    Lack of Transportation (Medical): No    Lack of Transportation (Non-Medical): No   Physical Activity: Inactive    Days of Exercise per Week: 0 days    Minutes of Exercise per Session: 0 min   Stress: Stress Concern Present    Feeling of  Stress : Very much   Social Connections: Socially Integrated    Frequency of Communication with Friends and Family: More than three times a week    Frequency of Social Gatherings with Friends and Family: Never    Attends Taoism Services: More than 4 times per year    Active Member of Clubs or Organizations: Yes    Attends Club or Organization Meetings: 1 to 4 times per year    Marital Status:    Housing Stability: Low Risk     Unable to Pay for Housing in the Last Year: No    Number of Places Lived in the Last Year: 1    Unstable Housing in the Last Year: No     Past Medical History:   Diagnosis Date    Chronic diastolic congestive heart failure 8/25/2020    Encounter for blood transfusion     History of repair of aneurysm of abdominal aorta using endovascular stent graft     DR Bonds    Hx of psychiatric care     Hypertension     Major depressive disorder, single episode, moderate with anxious distress 1/14/2020    Malignant neoplasm of upper-outer quadrant of right breast in female, estrogen receptor positive 3/14/2019    radiation    Psychiatric problem     Sleep apnea     Sleep difficulties     Stroke 2009    no residual defect    Therapy      Family History   Problem Relation Age of Onset    Diabetes Maternal Grandmother     Diabetes Maternal Grandfather     Diabetes Mother     Hypertension Mother     Sickle cell anemia Daughter     Breast cancer Neg Hx     Colon cancer Neg Hx     Ovarian cancer Neg Hx      Past Surgical History:   Procedure Laterality Date    APPENDECTOMY      AXILLARY NODE DISSECTION Right 12/02/2020    Procedure: LYMPHADENECTOMY, AXILLARY;  Surgeon: Artemio Starks MD;  Location: [facility];  Service: General;  Laterality: Right;    BIOPSY OF AXILLARY NODE Right 03/02/2021    Procedure: BIOPSY, LYMPH NODE, AXILLARY;  Surgeon: Artemio Sutton MD;  Location: [address];  Service: General;  Laterality: Right;    BREAST BIOPSY      2019    BREAST LUMPECTOMY      2019      SECTION      x 1    OOPHORECTOMY      right     SENTINEL LYMPH NODE BIOPSY Right 2019    Procedure: BIOPSY, LYMPH NODE, SENTINEL;  Surgeon: Artemio Starks MD;  Location: [facility];  Service: General;  Laterality: Right;    splenic artery aneurysm repair      TONSILLECTOMY       Review of Systems   Constitutional:  Negative for appetite change and fatigue.   HENT:  Negative for mouth sores, rhinorrhea and sore throat.    Eyes: Negative.    Respiratory: Negative.     Cardiovascular: Negative.    Gastrointestinal:  Negative for constipation, diarrhea, nausea and vomiting.   Genitourinary: Negative.  Positive for hot flashes.   Musculoskeletal:  Positive for arthralgias (mild, improved).   Integumentary:  Negative.   Allergic/Immunologic: Negative.    Neurological:  Negative for weakness and numbness.   Hematological:  Bruises/bleeds easily.   Psychiatric/Behavioral: Negative.         Medication List with Changes/Refills   Current Medications    ALBUTEROL (VENTOLIN HFA) 90 MCG/ACTUATION INHALER    Inhale 2 puffs into the lungs every 4 (four) hours as needed for Shortness of Breath.    ALPRAZOLAM (XANAX) 0.5 MG TABLET    Take 1 tablet (0.5 mg total) by mouth 2 (two) times daily as needed for Anxiety.    APIXABAN (ELIQUIS) 5 MG TAB    Take 1 tablet (5 mg total) by mouth 2 (two) times daily.    ATORVASTATIN (LIPITOR) 20 MG TABLET    Take 1 tablet (20 mg total) by mouth once daily.    CARVEDILOL (COREG) 25 MG TABLET    Take 1 tablet (25 mg total) by mouth 2 (two) times daily with meals.    CHOLECALCIFEROL, VITAMIN D3, 1,250 MCG (50,000 UNIT) CAPSULE    Take 1 capsule (50,000 Units total) by mouth once a week.    DICLOFENAC SODIUM (VOLTAREN) 1 % GEL    Apply 2 grams topically once daily.    DOXAZOSIN (CARDURA) 4 MG TABLET    Take 1 tablet (4 mg total) by mouth every evening.    DULOXETINE (CYMBALTA) 30 MG CAPSULE    TAKE 1 CAPSULE ONE TIME DAILY    FUROSEMIDE (LASIX) 20 MG TABLET    Take 1 tablet by mouth BID     HYDRALAZINE (APRESOLINE) 100 MG TABLET    TAKE 1 TABLET EVERY 8 HOURS    HYDROCHLOROTHIAZIDE (HYDRODIURIL) 50 MG TABLET    Take 1 tablet (50 mg total) by mouth once daily.    HYDROXYZINE HCL (ATARAX) 25 MG TABLET    TAKE 1 TABLET EVERY EVENING    LETROZOLE (FEMARA) 2.5 MG TAB    TAKE 1 TABLET EVERY DAY    LIDOCAINE (LIDODERM) 5 %    Place 2 patches onto the skin once daily. Remove & Discard patch within 12 hours or as directed by MD    MULTIVITAMIN (THERAGRAN) PER TABLET    Take 1 tablet by mouth once daily.    OXYCODONE-ACETAMINOPHEN (PERCOCET) 7.5-325 MG PER TABLET    Take 1 tablet by mouth every 6 (six) hours as needed for Pain.    PREDNISONE (DELTASONE) 20 MG TABLET    Take 4 tabs PO day 1 then 3 tabs PO day 2 then 2 tabs PO day3 then take 1 tab PO day 4    PREGABALIN (LYRICA) 75 MG CAPSULE    Take 1 capsule (75 mg total) by mouth 3 (three) times daily.    TELMISARTAN (MICARDIS) 80 MG TAB    Take 1 tablet (80 mg total) by mouth once daily.    TIZANIDINE (ZANAFLEX) 4 MG TABLET    TAKE 1 TABLET EVERY 8 HOURS AS NEEDED FOR MUSCLE SPASM(S)    VENLAFAXINE (EFFEXOR-XR) 37.5 MG 24 HR CAPSULE    Take 1 capsule (37.5 mg total) by mouth 2 (two) times a day.    ZOLPIDEM (AMBIEN) 5 MG TAB    Take 1 tablet (5 mg total) by mouth nightly as needed (sleep).     Objective:     Vitals:    01/11/23 1120   BP: 107/71   Pulse: 61   Temp: 97.1 °F (36.2 °C)     Physical Exam  Vitals reviewed.   Constitutional:       Appearance: Normal appearance. She is obese.   HENT:      Head: Normocephalic.      Mouth/Throat:      Comments: Wearing mask    Eyes:      Extraocular Movements: Extraocular movements intact.      Pupils: Pupils are equal, round, and reactive to light.      Comments: Glasses       Cardiovascular:      Rate and Rhythm: Normal rate and regular rhythm.      Heart sounds: Normal heart sounds.   Pulmonary:      Effort: Pulmonary effort is normal.      Breath sounds: Normal breath sounds.   Abdominal:      General: Bowel  sounds are normal.      Palpations: Abdomen is soft.      Comments: rounded     Genitourinary:     Comments: deferred    Musculoskeletal:         General: Normal range of motion.      Cervical back: Normal range of motion and neck supple.   Skin:     General: Skin is warm and dry.      Findings: Bruising (left hand, mild) present.   Neurological:      Mental Status: She is alert and oriented to person, place, and time.   Psychiatric:         Behavior: Behavior normal.         Thought Content: Thought content normal.        (2) Ambulatory and capable of self care, unable to carry out work activity, up and about > 50% or waking hours  Assessment:     Problem List Items Addressed This Visit          Hematology    Acute pulmonary embolism       Oncology    Malignant neoplasm of upper-outer quadrant of right breast in female, estrogen receptor positive - Primary (Chronic)     72-year-old female with stage IIA (pT2, pN0(sn), cM0, G2, ER+, [place]-, HER2-) invasive lobular carcinoma of right breast, status post lumpectomy with sentinel lymph node biopsy in 2019.  Currently on Femara and tolerating well.  DEXA scan ordered.  Yet to be obtained.     Unfortunately, imaging studies have shown persistently enlarged right subpectoral lymph node as well as FDG avid left posterior cervical and supraclavicular lymph nodes.  Multiple biopsies in the past have been nondiagnostic for malignancy.  Case was presented at multi-disciplinary tumor conference with recommendation to continue Arimidex.  Patient was not satisfied with recommendation a requested for 2nd opinion.     She was evaluated by Dr. Sutton in [place] who recommended MRI of breast.     MRI was performed on 02/23/2021 and showed lymph node mass deep to the right pectoralis minor muscle measuring 6.6 x 4.7 x 2.9 cm. There were also adjacent satellite lymph nodes anteriorly to the dominant lymph node measured 1.1 x 0.8 cm and 0.6 x 0.4 cm.  Also described was a mass effect  upon the right subclavian vein.  The mass does not in case or envelope the subclavian neurovascular structures.     Given the above findings, recommendation was made to excise the mass if not for diagnostic purposes for pain management.  She underwent lymph node excision on 03/01/2021, pathology returned back as lymph node with follicular hyperplasia. No evidence of metastatic carcinoma or lymphoma.     She was continued on Femara with plan for short interval PET scan.     Patient had a PET scan on 4/14/2021 that showed interim right axillary lymph node excision with large residual hypermetabolic right subpectoral lymph node mass.  Also noted interval development of subcentimeter mildly FDG avid left posterior cervical and supraclavicular lymph nodes. No FDG avid mediastinal or hilar nodes.No FDG avid pulmonary nodules/masses.      Had diagnostic bilateral mammography on 9/24/2021, results showed a left breast subcentimeter mass at the 9 o'clock position which is new when compared to mammography from 2019.  Will plan a short-interval mammogram in 6 months to re-assess left breast mass.There was no mammographic evidence of disease in the right breast.     Saw Dr. Sutton in [place] who recommended a repeat PET-CT scan. Results from PET-CT scan showed interval enlargement of subpectoral mass on the right with sustained highly FDG avidity.     Me was she continues to complain of shoulder pain and breast discomfort.  Most recently CT neck chest with contrast was obtained on 03/17/2022, when compared to PET scan from October of 2021, the right subpectoral lymph node has not changed and still measures about 6 cm maximally.     Given continuing discomfort and pain associated with these enlarged lymph nodes.  She was referred to surgical oncologist following extensive conversation, it appears the risk of repeat surgical resection outweighs the benefits.  Decision was made to optimize neuropathic pain management.      Lyrica was initiated for neuropathic pain control. Continue Femara and pain management.          Plan:     Malignant neoplasm of upper-outer quadrant of right breast in female, estrogen receptor positive    Acute pulmonary embolism, unspecified pulmonary embolism type, unspecified whether acute cor pulmonale present    Labs reviewed.   Continue Femara and Eliquis as prescribed.   Start vitamin D and calcium supplementation daily.   Follow up in  6 months  with Lili NP with  DEXA scan results, CBC, and Comprehensive Metabolic Panel.  Repeat DEXA due 7/2023.   Surveillance MMG due 10/2023.    Per NCCN Guidelines BINV-17:  H&P 1-4 times per year as clinically appropriate for 5 years then annually  Periodic screening for changes in family history and genetic testing indications and referral to genetic counseling as indicated  Educate, monitor, and refer for lymphedema management  MMG every 12 months  Routine imaging of reconstructed breast is not indicated  For patients receiving anthracycline-based therapy, see NCCN Guidelines for Survivorship for echocardiogram recommendations  In the absence of clinical signs and symptoms suggestive of recurrent disease, there is no indication for lab or imaging studies for metastases screening    Route Chart for Scheduling    Med Onc Chart Routing      Follow up with physician    Follow up with CELESTINA 6 months. see plan note   Infusion scheduling note    Injection scheduling note    Labs CBC and CMP   Lab interval:  see plan note   Imaging DXA scan      Pharmacy appointment No pharmacy appointment needed      Other referrals No additional referrals needed        I will review assessment/plan with collaborating physician.        JACKELINE Stewart

## 2023-01-10 LAB
CV STRESS BASE HR: 69 BPM
DIASTOLIC BLOOD PRESSURE: 113 MMHG
NUC REST EJECTION FRACTION: 57
NUC STRESS EJECTION FRACTION: 63 %
OHS CV CPX 85 PERCENT MAX PREDICTED HEART RATE MALE: 122
OHS CV CPX ESTIMATED METS: 1
OHS CV CPX MAX PREDICTED HEART RATE: 144
OHS CV CPX PATIENT IS FEMALE: 1
OHS CV CPX PATIENT IS MALE: 0
OHS CV CPX PEAK DIASTOLIC BLOOD PRESSURE: 94 MMHG
OHS CV CPX PEAK HEAR RATE: 83 BPM
OHS CV CPX PEAK RATE PRESSURE PRODUCT: NORMAL
OHS CV CPX PEAK SYSTOLIC BLOOD PRESSURE: 124 MMHG
OHS CV CPX PERCENT MAX PREDICTED HEART RATE ACHIEVED: 58
OHS CV CPX RATE PRESSURE PRODUCT PRESENTING: NORMAL
STRESS ECHO POST EXERCISE DUR SEC: 58 SECONDS
SYSTOLIC BLOOD PRESSURE: 161 MMHG

## 2023-01-11 ENCOUNTER — LAB VISIT (OUTPATIENT)
Dept: LAB | Facility: HOSPITAL | Age: 73
End: 2023-01-11
Attending: FAMILY MEDICINE
Payer: MEDICARE

## 2023-01-11 ENCOUNTER — OFFICE VISIT (OUTPATIENT)
Dept: HEMATOLOGY/ONCOLOGY | Facility: CLINIC | Age: 73
End: 2023-01-11
Payer: MEDICARE

## 2023-01-11 ENCOUNTER — PATIENT MESSAGE (OUTPATIENT)
Dept: CARDIOLOGY | Facility: CLINIC | Age: 73
End: 2023-01-11
Payer: MEDICARE

## 2023-01-11 VITALS
OXYGEN SATURATION: 94 % | HEART RATE: 61 BPM | HEIGHT: 72 IN | DIASTOLIC BLOOD PRESSURE: 71 MMHG | WEIGHT: 293 LBS | SYSTOLIC BLOOD PRESSURE: 107 MMHG | TEMPERATURE: 97 F | BODY MASS INDEX: 39.68 KG/M2

## 2023-01-11 DIAGNOSIS — C50.411 MALIGNANT NEOPLASM OF UPPER-OUTER QUADRANT OF RIGHT BREAST IN FEMALE, ESTROGEN RECEPTOR POSITIVE: Chronic | ICD-10-CM

## 2023-01-11 DIAGNOSIS — Z86.711 HISTORY OF PULMONARY EMBOLISM: ICD-10-CM

## 2023-01-11 DIAGNOSIS — Z79.811 LONG TERM (CURRENT) USE OF AROMATASE INHIBITORS: ICD-10-CM

## 2023-01-11 DIAGNOSIS — E66.01 MORBID OBESITY WITH BMI OF 45.0-49.9, ADULT: ICD-10-CM

## 2023-01-11 DIAGNOSIS — G62.9 NEUROPATHY: ICD-10-CM

## 2023-01-11 DIAGNOSIS — F54 PSYCHOLOGICAL FACTORS AFFECTING MEDICAL CONDITION: ICD-10-CM

## 2023-01-11 DIAGNOSIS — Z17.0 MALIGNANT NEOPLASM OF UPPER-OUTER QUADRANT OF RIGHT BREAST IN FEMALE, ESTROGEN RECEPTOR POSITIVE: Chronic | ICD-10-CM

## 2023-01-11 DIAGNOSIS — C50.411 MALIGNANT NEOPLASM OF UPPER-OUTER QUADRANT OF RIGHT BREAST IN FEMALE, ESTROGEN RECEPTOR POSITIVE: Primary | ICD-10-CM

## 2023-01-11 DIAGNOSIS — F32.1 MAJOR DEPRESSIVE DISORDER, SINGLE EPISODE, MODERATE WITH ANXIOUS DISTRESS: ICD-10-CM

## 2023-01-11 DIAGNOSIS — I26.99 ACUTE PULMONARY EMBOLISM, UNSPECIFIED PULMONARY EMBOLISM TYPE, UNSPECIFIED WHETHER ACUTE COR PULMONALE PRESENT: ICD-10-CM

## 2023-01-11 DIAGNOSIS — Z17.0 MALIGNANT NEOPLASM OF UPPER-OUTER QUADRANT OF RIGHT BREAST IN FEMALE, ESTROGEN RECEPTOR POSITIVE: Primary | ICD-10-CM

## 2023-01-11 LAB
ALBUMIN SERPL BCP-MCNC: 3.2 G/DL (ref 3.5–5.2)
ALP SERPL-CCNC: 124 U/L (ref 55–135)
ALT SERPL W/O P-5'-P-CCNC: 14 U/L (ref 10–44)
ANION GAP SERPL CALC-SCNC: 9 MMOL/L (ref 8–16)
AST SERPL-CCNC: 15 U/L (ref 10–40)
BASOPHILS # BLD AUTO: 0.09 K/UL (ref 0–0.2)
BASOPHILS NFR BLD: 1.1 % (ref 0–1.9)
BILIRUB SERPL-MCNC: 0.4 MG/DL (ref 0.1–1)
BUN SERPL-MCNC: 20 MG/DL (ref 8–23)
CALCIUM SERPL-MCNC: 9.5 MG/DL (ref 8.7–10.5)
CHLORIDE SERPL-SCNC: 109 MMOL/L (ref 95–110)
CO2 SERPL-SCNC: 24 MMOL/L (ref 23–29)
CREAT SERPL-MCNC: 1.2 MG/DL (ref 0.5–1.4)
DIFFERENTIAL METHOD: ABNORMAL
EOSINOPHIL # BLD AUTO: 0.1 K/UL (ref 0–0.5)
EOSINOPHIL NFR BLD: 0.8 % (ref 0–8)
ERYTHROCYTE [DISTWIDTH] IN BLOOD BY AUTOMATED COUNT: 17.6 % (ref 11.5–14.5)
EST. GFR  (NO RACE VARIABLE): 48 ML/MIN/1.73 M^2
GLUCOSE SERPL-MCNC: 104 MG/DL (ref 70–110)
HCT VFR BLD AUTO: 37.4 % (ref 37–48.5)
HGB BLD-MCNC: 11.5 G/DL (ref 12–16)
IMM GRANULOCYTES # BLD AUTO: 0.02 K/UL (ref 0–0.04)
IMM GRANULOCYTES NFR BLD AUTO: 0.2 % (ref 0–0.5)
LYMPHOCYTES # BLD AUTO: 2.8 K/UL (ref 1–4.8)
LYMPHOCYTES NFR BLD: 33.7 % (ref 18–48)
MCH RBC QN AUTO: 24.4 PG (ref 27–31)
MCHC RBC AUTO-ENTMCNC: 30.7 G/DL (ref 32–36)
MCV RBC AUTO: 79 FL (ref 82–98)
MONOCYTES # BLD AUTO: 0.7 K/UL (ref 0.3–1)
MONOCYTES NFR BLD: 8.6 % (ref 4–15)
NEUTROPHILS # BLD AUTO: 4.6 K/UL (ref 1.8–7.7)
NEUTROPHILS NFR BLD: 55.6 % (ref 38–73)
NRBC BLD-RTO: 0 /100 WBC
PLATELET # BLD AUTO: 257 K/UL (ref 150–450)
PMV BLD AUTO: 9.4 FL (ref 9.2–12.9)
POTASSIUM SERPL-SCNC: 4 MMOL/L (ref 3.5–5.1)
PROT SERPL-MCNC: 7.2 G/DL (ref 6–8.4)
RBC # BLD AUTO: 4.72 M/UL (ref 4–5.4)
SODIUM SERPL-SCNC: 142 MMOL/L (ref 136–145)
WBC # BLD AUTO: 8.34 K/UL (ref 3.9–12.7)

## 2023-01-11 PROCEDURE — 3074F PR MOST RECENT SYSTOLIC BLOOD PRESSURE < 130 MM HG: ICD-10-PCS | Mod: HCNC,CPTII,S$GLB, | Performed by: NURSE PRACTITIONER

## 2023-01-11 PROCEDURE — 1101F PT FALLS ASSESS-DOCD LE1/YR: CPT | Mod: HCNC,CPTII,S$GLB, | Performed by: NURSE PRACTITIONER

## 2023-01-11 PROCEDURE — 1125F AMNT PAIN NOTED PAIN PRSNT: CPT | Mod: HCNC,CPTII,S$GLB, | Performed by: NURSE PRACTITIONER

## 2023-01-11 PROCEDURE — 3288F PR FALLS RISK ASSESSMENT DOCUMENTED: ICD-10-PCS | Mod: HCNC,CPTII,S$GLB, | Performed by: NURSE PRACTITIONER

## 2023-01-11 PROCEDURE — 85025 COMPLETE CBC W/AUTO DIFF WBC: CPT | Mod: HCNC | Performed by: INTERNAL MEDICINE

## 2023-01-11 PROCEDURE — 3008F BODY MASS INDEX DOCD: CPT | Mod: HCNC,CPTII,S$GLB, | Performed by: NURSE PRACTITIONER

## 2023-01-11 PROCEDURE — 1125F PR PAIN SEVERITY QUANTIFIED, PAIN PRESENT: ICD-10-PCS | Mod: HCNC,CPTII,S$GLB, | Performed by: NURSE PRACTITIONER

## 2023-01-11 PROCEDURE — 1159F PR MEDICATION LIST DOCUMENTED IN MEDICAL RECORD: ICD-10-PCS | Mod: HCNC,CPTII,S$GLB, | Performed by: NURSE PRACTITIONER

## 2023-01-11 PROCEDURE — 3008F PR BODY MASS INDEX (BMI) DOCUMENTED: ICD-10-PCS | Mod: HCNC,CPTII,S$GLB, | Performed by: NURSE PRACTITIONER

## 2023-01-11 PROCEDURE — 99999 PR PBB SHADOW E&M-EST. PATIENT-LVL IV: ICD-10-PCS | Mod: PBBFAC,HCNC,, | Performed by: NURSE PRACTITIONER

## 2023-01-11 PROCEDURE — 3074F SYST BP LT 130 MM HG: CPT | Mod: HCNC,CPTII,S$GLB, | Performed by: NURSE PRACTITIONER

## 2023-01-11 PROCEDURE — 99999 PR PBB SHADOW E&M-EST. PATIENT-LVL IV: CPT | Mod: PBBFAC,HCNC,, | Performed by: NURSE PRACTITIONER

## 2023-01-11 PROCEDURE — 1157F ADVNC CARE PLAN IN RCRD: CPT | Mod: HCNC,CPTII,S$GLB, | Performed by: NURSE PRACTITIONER

## 2023-01-11 PROCEDURE — 3078F DIAST BP <80 MM HG: CPT | Mod: HCNC,CPTII,S$GLB, | Performed by: NURSE PRACTITIONER

## 2023-01-11 PROCEDURE — 1160F RVW MEDS BY RX/DR IN RCRD: CPT | Mod: HCNC,CPTII,S$GLB, | Performed by: NURSE PRACTITIONER

## 2023-01-11 PROCEDURE — 80053 COMPREHEN METABOLIC PANEL: CPT | Mod: HCNC | Performed by: INTERNAL MEDICINE

## 2023-01-11 PROCEDURE — 1101F PR PT FALLS ASSESS DOC 0-1 FALLS W/OUT INJ PAST YR: ICD-10-PCS | Mod: HCNC,CPTII,S$GLB, | Performed by: NURSE PRACTITIONER

## 2023-01-11 PROCEDURE — 1159F MED LIST DOCD IN RCRD: CPT | Mod: HCNC,CPTII,S$GLB, | Performed by: NURSE PRACTITIONER

## 2023-01-11 PROCEDURE — 1157F PR ADVANCE CARE PLAN OR EQUIV PRESENT IN MEDICAL RECORD: ICD-10-PCS | Mod: HCNC,CPTII,S$GLB, | Performed by: NURSE PRACTITIONER

## 2023-01-11 PROCEDURE — 3288F FALL RISK ASSESSMENT DOCD: CPT | Mod: HCNC,CPTII,S$GLB, | Performed by: NURSE PRACTITIONER

## 2023-01-11 PROCEDURE — 99213 PR OFFICE/OUTPT VISIT, EST, LEVL III, 20-29 MIN: ICD-10-PCS | Mod: HCNC,S$GLB,, | Performed by: NURSE PRACTITIONER

## 2023-01-11 PROCEDURE — 99213 OFFICE O/P EST LOW 20 MIN: CPT | Mod: HCNC,S$GLB,, | Performed by: NURSE PRACTITIONER

## 2023-01-11 PROCEDURE — 36415 COLL VENOUS BLD VENIPUNCTURE: CPT | Mod: HCNC | Performed by: INTERNAL MEDICINE

## 2023-01-11 PROCEDURE — 3078F PR MOST RECENT DIASTOLIC BLOOD PRESSURE < 80 MM HG: ICD-10-PCS | Mod: HCNC,CPTII,S$GLB, | Performed by: NURSE PRACTITIONER

## 2023-01-11 PROCEDURE — 1160F PR REVIEW ALL MEDS BY PRESCRIBER/CLIN PHARMACIST DOCUMENTED: ICD-10-PCS | Mod: HCNC,CPTII,S$GLB, | Performed by: NURSE PRACTITIONER

## 2023-01-11 RX ORDER — LETROZOLE 2.5 MG/1
2.5 TABLET, FILM COATED ORAL DAILY
Qty: 90 TABLET | Refills: 1 | Status: SHIPPED | OUTPATIENT
Start: 2023-01-11 | End: 2023-05-17 | Stop reason: SDUPTHER

## 2023-01-11 NOTE — TELEPHONE ENCOUNTER
No new care gaps identified.  [facility] Embedded Care Gaps. Reference number: [id number]. 1/11/2023   4:01:02 PM CST

## 2023-01-11 NOTE — TELEPHONE ENCOUNTER
Refill Routing Note   Medication(s) are not appropriate for processing by [facility] for the following reason(s):      - Outside of protocol    [facility] action(s):  Route          Medication reconciliation completed: No     Appointments  past 12m or future 3m with PCP    Date Provider   Last Visit   11/11/2022 Peace Arias MD   Next Visit   1/11/2023 Peace Arias MD   ED visits in past 90 days: 0        Note composed:4:30 PM 01/11/2023

## 2023-01-12 RX ORDER — ZOLPIDEM TARTRATE 5 MG/1
5 TABLET ORAL NIGHTLY PRN
Qty: 90 TABLET | Refills: 1 | Status: SHIPPED | OUTPATIENT
Start: 2023-01-12 | End: 2023-05-17 | Stop reason: SDUPTHER

## 2023-01-12 RX ORDER — ALPRAZOLAM 0.5 MG/1
0.5 TABLET ORAL 2 TIMES DAILY PRN
Qty: 180 TABLET | Refills: 0 | Status: SHIPPED | OUTPATIENT
Start: 2023-01-12 | End: 2023-02-10 | Stop reason: SDUPTHER

## 2023-01-12 RX ORDER — RANOLAZINE 500 MG/1
500 TABLET, EXTENDED RELEASE ORAL 2 TIMES DAILY
Qty: 180 TABLET | Refills: 3 | Status: SHIPPED | OUTPATIENT
Start: 2023-01-12 | End: 2023-02-20 | Stop reason: SDUPTHER

## 2023-01-12 NOTE — PROGRESS NOTES
Still having chest pain, will start on Ranexa.  Cannot do isosorbide or nitroglycerin due to low blood pressures.

## 2023-01-13 ENCOUNTER — OFFICE VISIT (OUTPATIENT)
Dept: INTERNAL MEDICINE | Facility: CLINIC | Age: 73
End: 2023-01-13
Payer: MEDICARE

## 2023-01-13 ENCOUNTER — PATIENT MESSAGE (OUTPATIENT)
Dept: ADMINISTRATIVE | Facility: OTHER | Age: 73
End: 2023-01-13
Payer: MEDICARE

## 2023-01-13 VITALS
DIASTOLIC BLOOD PRESSURE: 108 MMHG | RESPIRATION RATE: 18 BRPM | HEIGHT: 72 IN | WEIGHT: 293 LBS | OXYGEN SATURATION: 96 % | BODY MASS INDEX: 39.68 KG/M2 | SYSTOLIC BLOOD PRESSURE: 184 MMHG | HEART RATE: 82 BPM

## 2023-01-13 DIAGNOSIS — N18.32 STAGE 3B CHRONIC KIDNEY DISEASE: ICD-10-CM

## 2023-01-13 DIAGNOSIS — R42 DIZZY SPELLS: ICD-10-CM

## 2023-01-13 DIAGNOSIS — E66.01 MORBID OBESITY WITH BMI OF 45.0-49.9, ADULT: ICD-10-CM

## 2023-01-13 DIAGNOSIS — I50.32 CHRONIC DIASTOLIC CONGESTIVE HEART FAILURE: ICD-10-CM

## 2023-01-13 DIAGNOSIS — R29.6 FALLS FREQUENTLY: Primary | ICD-10-CM

## 2023-01-13 DIAGNOSIS — G25.0 ESSENTIAL TREMOR: ICD-10-CM

## 2023-01-13 DIAGNOSIS — F32.1 MAJOR DEPRESSIVE DISORDER, SINGLE EPISODE, MODERATE WITH ANXIOUS DISTRESS: ICD-10-CM

## 2023-01-13 DIAGNOSIS — I10 ESSENTIAL HYPERTENSION: ICD-10-CM

## 2023-01-13 PROCEDURE — 3077F SYST BP >= 140 MM HG: CPT | Mod: HCNC,CPTII,S$GLB, | Performed by: FAMILY MEDICINE

## 2023-01-13 PROCEDURE — 99214 PR OFFICE/OUTPT VISIT, EST, LEVL IV, 30-39 MIN: ICD-10-PCS | Mod: HCNC,S$GLB,, | Performed by: FAMILY MEDICINE

## 2023-01-13 PROCEDURE — 3080F PR MOST RECENT DIASTOLIC BLOOD PRESSURE >= 90 MM HG: ICD-10-PCS | Mod: HCNC,CPTII,S$GLB, | Performed by: FAMILY MEDICINE

## 2023-01-13 PROCEDURE — 1157F ADVNC CARE PLAN IN RCRD: CPT | Mod: HCNC,CPTII,S$GLB, | Performed by: FAMILY MEDICINE

## 2023-01-13 PROCEDURE — 3288F FALL RISK ASSESSMENT DOCD: CPT | Mod: HCNC,CPTII,S$GLB, | Performed by: FAMILY MEDICINE

## 2023-01-13 PROCEDURE — 1126F AMNT PAIN NOTED NONE PRSNT: CPT | Mod: HCNC,CPTII,S$GLB, | Performed by: FAMILY MEDICINE

## 2023-01-13 PROCEDURE — 99214 OFFICE O/P EST MOD 30 MIN: CPT | Mod: HCNC,S$GLB,, | Performed by: FAMILY MEDICINE

## 2023-01-13 PROCEDURE — 1101F PR PT FALLS ASSESS DOC 0-1 FALLS W/OUT INJ PAST YR: ICD-10-PCS | Mod: HCNC,CPTII,S$GLB, | Performed by: FAMILY MEDICINE

## 2023-01-13 PROCEDURE — 1159F MED LIST DOCD IN RCRD: CPT | Mod: HCNC,CPTII,S$GLB, | Performed by: FAMILY MEDICINE

## 2023-01-13 PROCEDURE — 1101F PT FALLS ASSESS-DOCD LE1/YR: CPT | Mod: HCNC,CPTII,S$GLB, | Performed by: FAMILY MEDICINE

## 2023-01-13 PROCEDURE — 1126F PR PAIN SEVERITY QUANTIFIED, NO PAIN PRESENT: ICD-10-PCS | Mod: HCNC,CPTII,S$GLB, | Performed by: FAMILY MEDICINE

## 2023-01-13 PROCEDURE — 3077F PR MOST RECENT SYSTOLIC BLOOD PRESSURE >= 140 MM HG: ICD-10-PCS | Mod: HCNC,CPTII,S$GLB, | Performed by: FAMILY MEDICINE

## 2023-01-13 PROCEDURE — 1157F PR ADVANCE CARE PLAN OR EQUIV PRESENT IN MEDICAL RECORD: ICD-10-PCS | Mod: HCNC,CPTII,S$GLB, | Performed by: FAMILY MEDICINE

## 2023-01-13 PROCEDURE — 99999 PR PBB SHADOW E&M-EST. PATIENT-LVL V: ICD-10-PCS | Mod: PBBFAC,HCNC,, | Performed by: FAMILY MEDICINE

## 2023-01-13 PROCEDURE — 99999 PR PBB SHADOW E&M-EST. PATIENT-LVL V: CPT | Mod: PBBFAC,HCNC,, | Performed by: FAMILY MEDICINE

## 2023-01-13 PROCEDURE — 1159F PR MEDICATION LIST DOCUMENTED IN MEDICAL RECORD: ICD-10-PCS | Mod: HCNC,CPTII,S$GLB, | Performed by: FAMILY MEDICINE

## 2023-01-13 PROCEDURE — 3008F BODY MASS INDEX DOCD: CPT | Mod: HCNC,CPTII,S$GLB, | Performed by: FAMILY MEDICINE

## 2023-01-13 PROCEDURE — 3288F PR FALLS RISK ASSESSMENT DOCUMENTED: ICD-10-PCS | Mod: HCNC,CPTII,S$GLB, | Performed by: FAMILY MEDICINE

## 2023-01-13 PROCEDURE — 3008F PR BODY MASS INDEX (BMI) DOCUMENTED: ICD-10-PCS | Mod: HCNC,CPTII,S$GLB, | Performed by: FAMILY MEDICINE

## 2023-01-13 PROCEDURE — 3080F DIAST BP >= 90 MM HG: CPT | Mod: HCNC,CPTII,S$GLB, | Performed by: FAMILY MEDICINE

## 2023-01-13 RX ORDER — BUSPIRONE HYDROCHLORIDE 15 MG/1
15 TABLET ORAL 3 TIMES DAILY PRN
Qty: 90 TABLET | Refills: 0 | Status: SHIPPED | OUTPATIENT
Start: 2023-01-13 | End: 2023-02-08 | Stop reason: SDUPTHER

## 2023-01-13 NOTE — PROGRESS NOTES
Subjective:       Patient ID: Susu Luther is a 73 y.o. female.    Chief Complaint: Follow-up    HPI Ms Luther presents today for follow up     HTN -blood pressure has been elevated   Cardiology sent a new medication for her blood pressure to come in the mail  After review the medication is for chest pain  She reports low blood pressure as well.   Spoke with her cardiologist and she confirms up and down pressures     Falls  She has continued to have falls   She gets dizzy   She does not have a warning.     Has moments where she gets shocks that goes through her head  She has to sit still for it to go away  Sometimes on its own or with fiorcet         Review of Systems   Constitutional:  Negative for activity change and unexpected weight change.   HENT:  Positive for trouble swallowing. Negative for hearing loss and rhinorrhea.    Eyes:  Positive for visual disturbance. Negative for discharge.   Respiratory:  Positive for chest tightness and wheezing.    Cardiovascular:  Positive for chest pain and palpitations.   Gastrointestinal:  Positive for diarrhea. Negative for blood in stool, constipation and vomiting.   Endocrine: Negative for polydipsia and polyuria.   Genitourinary:  Negative for difficulty urinating, dysuria, hematuria and menstrual problem.   Musculoskeletal:  Positive for arthralgias and joint swelling. Negative for neck pain.   Neurological:  Positive for weakness and headaches.   Psychiatric/Behavioral:  Negative for confusion and dysphoric mood.          Past Medical History:   Diagnosis Date    Chronic diastolic congestive heart failure 8/25/2020    Encounter for blood transfusion     History of repair of aneurysm of abdominal aorta using endovascular stent graft     DR Bonds    Hx of psychiatric care     Hypertension     Major depressive disorder, single episode, moderate with anxious distress 1/14/2020    Malignant neoplasm of upper-outer quadrant of right breast in female, estrogen  receptor positive 3/14/2019    radiation    Psychiatric problem     Sleep apnea     Sleep difficulties     Stroke 2009    no residual defect    Therapy      Past Surgical History:   Procedure Laterality Date    APPENDECTOMY      AXILLARY NODE DISSECTION Right 2020    Procedure: LYMPHADENECTOMY, AXILLARY;  Surgeon: Artemio Starks MD;  Location: [facility];  Service: General;  Laterality: Right;    BIOPSY OF AXILLARY NODE Right 2021    Procedure: BIOPSY, LYMPH NODE, AXILLARY;  Surgeon: Artemio Sutton MD;  Location: [address];  Service: General;  Laterality: Right;    BREAST BIOPSY      2019    BREAST LUMPECTOMY      2019     SECTION      x 1    OOPHORECTOMY      right     SENTINEL LYMPH NODE BIOPSY Right 2019    Procedure: BIOPSY, LYMPH NODE, SENTINEL;  Surgeon: Artemio Starks MD;  Location: [facility];  Service: General;  Laterality: Right;    splenic artery aneurysm repair      TONSILLECTOMY       Family History   Problem Relation Age of Onset    Diabetes Maternal Grandmother     Diabetes Maternal Grandfather     Diabetes Mother     Hypertension Mother     Sickle cell anemia Daughter     Breast cancer Neg Hx     Colon cancer Neg Hx     Ovarian cancer Neg Hx            Objective:        Physical Exam  Constitutional:       Appearance: Normal appearance.   Cardiovascular:      Rate and Rhythm: Normal rate and regular rhythm.   Pulmonary:      Effort: Pulmonary effort is normal.   Skin:     General: Skin is warm.   Neurological:      Mental Status: She is alert and oriented to person, place, and time.      Comments: tremor   Psychiatric:         Mood and Affect: Mood normal.         Behavior: Behavior normal.             Assessment/Plan:   Falls frequently  -     Ambulatory referral/consult to Neurology; Future; Expected date: 2023    Dizzy spells  -     Ambulatory referral/consult to Neurology; Future; Expected date: 2023    Essential tremor  -     Ambulatory  referral/consult to Neurology; Future; Expected date: 01/20/2023    Chronic diastolic congestive heart failure-followed by cardiology    Morbid obesity with BMI of 45.0-49.9, adult-discuss calorie intake as she is not able to be physically active    Major depressive disorder, single episode, moderate with anxious distress  Taking medication and is stable     Stage 3b chronic kidney disease-stable    Essential hypertension-labile pressure   Add hydralazine as needed if BP >150/90    Other orders  -     busPIRone (BUSPAR) 15 MG tablet; Take 1 tablet (15 mg total) by mouth 3 (three) times daily as needed (anxiety).  Dispense: 90 tablet; Refill: 0    Considering possible seizure with her falls   Adding buspar for anxiety she is not able to fill her prescription for xanax early    Follow up 4 months           Peace Arias MD  [facility]   Family Medicine

## 2023-01-15 PROBLEM — I50.32 CHRONIC DIASTOLIC CONGESTIVE HEART FAILURE: Status: ACTIVE | Noted: 2023-01-15

## 2023-01-20 ENCOUNTER — PATIENT MESSAGE (OUTPATIENT)
Dept: INTERNAL MEDICINE | Facility: CLINIC | Age: 73
End: 2023-01-20
Payer: MEDICARE

## 2023-01-23 ENCOUNTER — PATIENT MESSAGE (OUTPATIENT)
Dept: CARDIOLOGY | Facility: CLINIC | Age: 73
End: 2023-01-23
Payer: MEDICARE

## 2023-02-08 RX ORDER — BUSPIRONE HYDROCHLORIDE 15 MG/1
15 TABLET ORAL 3 TIMES DAILY PRN
Qty: 90 TABLET | Refills: 0 | Status: SHIPPED | OUTPATIENT
Start: 2023-02-08 | End: 2023-03-23 | Stop reason: SDUPTHER

## 2023-02-09 DIAGNOSIS — Z00.00 ENCOUNTER FOR MEDICARE ANNUAL WELLNESS EXAM: ICD-10-CM

## 2023-02-10 ENCOUNTER — OFFICE VISIT (OUTPATIENT)
Dept: INTERNAL MEDICINE | Facility: CLINIC | Age: 73
End: 2023-02-10
Payer: MEDICARE

## 2023-02-10 VITALS
HEART RATE: 65 BPM | BODY MASS INDEX: 39.68 KG/M2 | TEMPERATURE: 96 F | HEIGHT: 72 IN | WEIGHT: 293 LBS | RESPIRATION RATE: 18 BRPM | DIASTOLIC BLOOD PRESSURE: 82 MMHG | OXYGEN SATURATION: 98 % | SYSTOLIC BLOOD PRESSURE: 164 MMHG

## 2023-02-10 DIAGNOSIS — I10 ESSENTIAL HYPERTENSION: ICD-10-CM

## 2023-02-10 DIAGNOSIS — R61 HYPERHIDROSIS: Primary | ICD-10-CM

## 2023-02-10 DIAGNOSIS — F54 PSYCHOLOGICAL FACTORS AFFECTING MEDICAL CONDITION: ICD-10-CM

## 2023-02-10 DIAGNOSIS — F32.1 MAJOR DEPRESSIVE DISORDER, SINGLE EPISODE, MODERATE WITH ANXIOUS DISTRESS: ICD-10-CM

## 2023-02-10 PROCEDURE — 1157F PR ADVANCE CARE PLAN OR EQUIV PRESENT IN MEDICAL RECORD: ICD-10-PCS | Mod: HCNC,CPTII,S$GLB, | Performed by: FAMILY MEDICINE

## 2023-02-10 PROCEDURE — 1100F PTFALLS ASSESS-DOCD GE2>/YR: CPT | Mod: HCNC,CPTII,S$GLB, | Performed by: FAMILY MEDICINE

## 2023-02-10 PROCEDURE — 99215 PR OFFICE/OUTPT VISIT, EST, LEVL V, 40-54 MIN: ICD-10-PCS | Mod: HCNC,S$GLB,, | Performed by: FAMILY MEDICINE

## 2023-02-10 PROCEDURE — 3288F FALL RISK ASSESSMENT DOCD: CPT | Mod: HCNC,CPTII,S$GLB, | Performed by: FAMILY MEDICINE

## 2023-02-10 PROCEDURE — 3288F PR FALLS RISK ASSESSMENT DOCUMENTED: ICD-10-PCS | Mod: HCNC,CPTII,S$GLB, | Performed by: FAMILY MEDICINE

## 2023-02-10 PROCEDURE — 1157F ADVNC CARE PLAN IN RCRD: CPT | Mod: HCNC,CPTII,S$GLB, | Performed by: FAMILY MEDICINE

## 2023-02-10 PROCEDURE — 1125F PR PAIN SEVERITY QUANTIFIED, PAIN PRESENT: ICD-10-PCS | Mod: HCNC,CPTII,S$GLB, | Performed by: FAMILY MEDICINE

## 2023-02-10 PROCEDURE — 99999 PR PBB SHADOW E&M-EST. PATIENT-LVL III: ICD-10-PCS | Mod: PBBFAC,HCNC,, | Performed by: FAMILY MEDICINE

## 2023-02-10 PROCEDURE — 3079F DIAST BP 80-89 MM HG: CPT | Mod: HCNC,CPTII,S$GLB, | Performed by: FAMILY MEDICINE

## 2023-02-10 PROCEDURE — 3008F BODY MASS INDEX DOCD: CPT | Mod: HCNC,CPTII,S$GLB, | Performed by: FAMILY MEDICINE

## 2023-02-10 PROCEDURE — 99215 OFFICE O/P EST HI 40 MIN: CPT | Mod: HCNC,S$GLB,, | Performed by: FAMILY MEDICINE

## 2023-02-10 PROCEDURE — 3008F PR BODY MASS INDEX (BMI) DOCUMENTED: ICD-10-PCS | Mod: HCNC,CPTII,S$GLB, | Performed by: FAMILY MEDICINE

## 2023-02-10 PROCEDURE — 4010F PR ACE/ARB THEARPY RXD/TAKEN: ICD-10-PCS | Mod: HCNC,CPTII,S$GLB, | Performed by: FAMILY MEDICINE

## 2023-02-10 PROCEDURE — 99999 PR PBB SHADOW E&M-EST. PATIENT-LVL III: CPT | Mod: PBBFAC,HCNC,, | Performed by: FAMILY MEDICINE

## 2023-02-10 PROCEDURE — 1125F AMNT PAIN NOTED PAIN PRSNT: CPT | Mod: HCNC,CPTII,S$GLB, | Performed by: FAMILY MEDICINE

## 2023-02-10 PROCEDURE — 3077F PR MOST RECENT SYSTOLIC BLOOD PRESSURE >= 140 MM HG: ICD-10-PCS | Mod: HCNC,CPTII,S$GLB, | Performed by: FAMILY MEDICINE

## 2023-02-10 PROCEDURE — 3079F PR MOST RECENT DIASTOLIC BLOOD PRESSURE 80-89 MM HG: ICD-10-PCS | Mod: HCNC,CPTII,S$GLB, | Performed by: FAMILY MEDICINE

## 2023-02-10 PROCEDURE — 4010F ACE/ARB THERAPY RXD/TAKEN: CPT | Mod: HCNC,CPTII,S$GLB, | Performed by: FAMILY MEDICINE

## 2023-02-10 PROCEDURE — 3077F SYST BP >= 140 MM HG: CPT | Mod: HCNC,CPTII,S$GLB, | Performed by: FAMILY MEDICINE

## 2023-02-10 PROCEDURE — 1100F PR PT FALLS ASSESS DOC 2+ FALLS/FALL W/INJURY/YR: ICD-10-PCS | Mod: HCNC,CPTII,S$GLB, | Performed by: FAMILY MEDICINE

## 2023-02-10 RX ORDER — ALPRAZOLAM 0.5 MG/1
0.5 TABLET ORAL 2 TIMES DAILY PRN
Qty: 180 TABLET | Refills: 0 | Status: SHIPPED | OUTPATIENT
Start: 2023-02-10 | End: 2023-03-17 | Stop reason: SDUPTHER

## 2023-02-10 RX ORDER — CLONIDINE HYDROCHLORIDE 0.1 MG/1
0.1 TABLET ORAL 2 TIMES DAILY PRN
Qty: 60 TABLET | Refills: 1 | Status: SHIPPED | OUTPATIENT
Start: 2023-02-10 | End: 2023-03-17 | Stop reason: SDUPTHER

## 2023-02-10 RX ORDER — OXYBUTYNIN CHLORIDE 5 MG/1
5 TABLET ORAL DAILY
Qty: 90 TABLET | Refills: 1 | Status: ON HOLD | OUTPATIENT
Start: 2023-02-10 | End: 2023-03-15 | Stop reason: HOSPADM

## 2023-02-10 NOTE — PROGRESS NOTES
Subjective:       Patient ID: Susu Luther is a 73 y.o. female.    Chief Complaint: Follow-up    Ms Luther presents today for follow up  She has continued to have elevated blood pressures even though we put her back on hydralazine.   She also incorporated clonidine as needed         Follow-up  Pertinent negatives include no abdominal pain, arthralgias, chest pain, congestion, coughing, fatigue, fever, headaches, nausea, numbness, rash, vomiting or weakness.   Hypertension  This is a recurrent problem. The current episode started more than 1 year ago. The problem has been rapidly worsening since onset. The problem is resistant. Associated symptoms include anxiety, blurred vision, malaise/fatigue, orthopnea, peripheral edema, PND, shortness of breath and sweats. Pertinent negatives include no chest pain, headaches or palpitations. There are no associated agents to hypertension. Risk factors for coronary artery disease include stress. Past treatments include nothing. The current treatment provides no improvement. Compliance problems include exercise.       blood pressure still going up and down but not hypotensive like she has.   Off balance   Feels it is carvedilol   Cardiology increased to 50 mg twice a day   She was on 25 mg     She has not had telmisartan    And realized this recently     She has still had some shortness of breath but takes her time with exertion     Review of Systems   Constitutional:  Positive for malaise/fatigue. Negative for activity change, appetite change, fatigue and fever.   HENT:  Negative for congestion, ear pain and sinus pressure.    Eyes:  Positive for blurred vision. Negative for pain and visual disturbance.   Respiratory:  Positive for shortness of breath. Negative for cough, chest tightness and wheezing.    Cardiovascular:  Positive for orthopnea and PND. Negative for chest pain, palpitations and leg swelling.   Gastrointestinal:  Negative for abdominal distention,  abdominal pain, constipation, diarrhea, nausea and vomiting.   Endocrine: Negative for polydipsia and polyuria.   Genitourinary:  Negative for difficulty urinating, dyspareunia, dysuria, flank pain and hematuria.   Musculoskeletal:  Negative for arthralgias and back pain.   Skin:  Negative for rash.   Neurological:  Negative for dizziness, tremors, syncope, weakness, numbness and headaches.   Psychiatric/Behavioral:  Negative for agitation and confusion.          Past Medical History:   Diagnosis Date    Chronic diastolic congestive heart failure 2020    Encounter for blood transfusion     History of repair of aneurysm of abdominal aorta using endovascular stent graft     DR Bonds     of psychiatric care     Hypertension     Major depressive disorder, single episode, moderate with anxious distress 2020    Malignant neoplasm of upper-outer quadrant of right breast in female, estrogen receptor positive 3/14/2019    radiation    Psychiatric problem     Sleep apnea     Sleep difficulties     Stroke     no residual defect    Therapy      Past Surgical History:   Procedure Laterality Date    APPENDECTOMY      AXILLARY NODE DISSECTION Right 2020    Procedure: LYMPHADENECTOMY, AXILLARY;  Surgeon: Artemio Starks MD;  Location: [facility] OR;  Service: General;  Laterality: Right;    BIOPSY OF AXILLARY NODE Right 2021    Procedure: BIOPSY, LYMPH NODE, AXILLARY;  Surgeon: Artemio Sutton MD;  Location: [address];  Service: General;  Laterality: Right;    BREAST BIOPSY          BREAST LUMPECTOMY      2019     SECTION      x 1    OOPHORECTOMY      right     SENTINEL LYMPH NODE BIOPSY Right 2019    Procedure: BIOPSY, LYMPH NODE, SENTINEL;  Surgeon: Artemio Starks MD;  Location: [facility];  Service: General;  Laterality: Right;    splenic artery aneurysm repair      TONSILLECTOMY       Family History   Problem Relation Age of Onset    Diabetes Maternal Grandmother     Diabetes  Maternal Grandfather     Diabetes Mother     Hypertension Mother     Sickle cell anemia Daughter     Breast cancer Neg Hx     Colon cancer Neg Hx     Ovarian cancer Neg Hx          Objective:        Physical Exam  Vitals reviewed.   Constitutional:       General: She is not in acute distress.     Appearance: Normal appearance. She is obese.   HENT:      Head: Normocephalic and atraumatic.      Right Ear: External ear normal.      Left Ear: External ear normal.      Nose: Nose normal.   Eyes:      General:         Right eye: No discharge.         Left eye: No discharge.      Pupils: Pupils are equal, round, and reactive to light.   Neck:      Thyroid: No thyromegaly.   Cardiovascular:      Rate and Rhythm: Normal rate and regular rhythm.      Heart sounds: Murmur heard.   Pulmonary:      Effort: Pulmonary effort is normal. No respiratory distress.      Breath sounds: Normal breath sounds. No wheezing.   Abdominal:      General: Bowel sounds are normal. There is no distension.      Palpations: Abdomen is soft.      Tenderness: There is no abdominal tenderness.   Musculoskeletal:         General: Normal range of motion.      Cervical back: Normal range of motion and neck supple.   Skin:     General: Skin is warm and dry.      Findings: No rash.   Neurological:      Mental Status: She is alert and oriented to person, place, and time.      Coordination: Coordination normal.   Psychiatric:         Behavior: Behavior normal.         Results for orders placed or performed in visit on 01/11/23   CBC Auto Differential   Result Value Ref Range    WBC 8.34 3.90 - 12.70 K/uL    RBC 4.72 4.00 - 5.40 M/uL    Hemoglobin 11.5 (L) 12.0 - 16.0 g/dL    Hematocrit 37.4 37.0 - 48.5 %    MCV 79 (L) 82 - 98 fL    MCH 24.4 (L) 27.0 - 31.0 pg    MCHC 30.7 (L) 32.0 - 36.0 g/dL    RDW 17.6 (H) 11.5 - 14.5 %    Platelets 257 150 - 450 K/uL    MPV 9.4 9.2 - 12.9 fL    Immature Granulocytes 0.2 0.0 - 0.5 %    Gran # (ANC) 4.6 1.8 - 7.7 K/uL     Immature Grans (Abs) 0.02 0.00 - 0.04 K/uL    Lymph # 2.8 1.0 - 4.8 K/uL    Mono # 0.7 0.3 - 1.0 K/uL    Eos # 0.1 0.0 - 0.5 K/uL    Baso # 0.09 0.00 - 0.20 K/uL    nRBC 0 0 /100 WBC    Gran % 55.6 38.0 - 73.0 %    Lymph % 33.7 18.0 - 48.0 %    Mono % 8.6 4.0 - 15.0 %    Eosinophil % 0.8 0.0 - 8.0 %    Basophil % 1.1 0.0 - 1.9 %    Differential Method Automated    Comprehensive Metabolic Panel   Result Value Ref Range    Sodium 142 136 - 145 mmol/L    Potassium 4.0 3.5 - 5.1 mmol/L    Chloride 109 95 - 110 mmol/L    CO2 24 23 - 29 mmol/L    Glucose 104 70 - 110 mg/dL    BUN 20 8 - 23 mg/dL    Creatinine 1.2 0.5 - 1.4 mg/dL    Calcium 9.5 8.7 - 10.5 mg/dL    Total Protein 7.2 6.0 - 8.4 g/dL    Albumin 3.2 (L) 3.5 - 5.2 g/dL    Total Bilirubin 0.4 0.1 - 1.0 mg/dL    Alkaline Phosphatase 124 55 - 135 U/L    AST 15 10 - 40 U/L    ALT 14 10 - 44 U/L    Anion Gap 9 8 - 16 mmol/L    eGFR 48 (A) >60 mL/min/1.73 m^2     *Note: Due to a large number of results and/or encounters for the requested time period, some results have not been displayed. A complete set of results can be found in Results Review.       Assessment/Plan:     Hyperhidrosis  -     oxybutynin (DITROPAN) 5 MG Tab; Take 1 tablet (5 mg total) by mouth once daily.  Dispense: 90 tablet; Refill: 1    Major depressive disorder, single episode, moderate with anxious distress  -     ALPRAZolam (XANAX) 0.5 MG tablet; Take 1 tablet (0.5 mg total) by mouth 2 (two) times daily as needed for Anxiety.  Dispense: 180 tablet; Refill: 0    Psychological factors affecting medical condition  -     ALPRAZolam (XANAX) 0.5 MG tablet; Take 1 tablet (0.5 mg total) by mouth 2 (two) times daily as needed for Anxiety.  Dispense: 180 tablet; Refill: 0    Essential hypertension  -     cloNIDine (CATAPRES) 0.1 MG tablet; Take 1 tablet (0.1 mg total) by mouth 2 (two) times daily as needed (BP>170/95).  Dispense: 60 tablet; Refill: 1      Restart telmasartan     Follow up 2 weeks      Peace Arias MD  [facility]

## 2023-02-20 ENCOUNTER — OFFICE VISIT (OUTPATIENT)
Dept: CARDIOLOGY | Facility: CLINIC | Age: 73
End: 2023-02-20
Payer: MEDICARE

## 2023-02-20 ENCOUNTER — HOSPITAL ENCOUNTER (OUTPATIENT)
Dept: CARDIOLOGY | Facility: HOSPITAL | Age: 73
Discharge: HOME | End: 2023-02-20
Attending: STUDENT IN AN ORGANIZED HEALTH CARE EDUCATION/TRAINING PROGRAM
Payer: MEDICARE

## 2023-02-20 VITALS
HEART RATE: 60 BPM | BODY MASS INDEX: 39.68 KG/M2 | DIASTOLIC BLOOD PRESSURE: 90 MMHG | OXYGEN SATURATION: 97 % | WEIGHT: 293 LBS | HEIGHT: 72 IN | SYSTOLIC BLOOD PRESSURE: 160 MMHG

## 2023-02-20 DIAGNOSIS — R06.02 SOB (SHORTNESS OF BREATH): Primary | ICD-10-CM

## 2023-02-20 DIAGNOSIS — Z17.0 MALIGNANT NEOPLASM OF UPPER-OUTER QUADRANT OF RIGHT BREAST IN FEMALE, ESTROGEN RECEPTOR POSITIVE: ICD-10-CM

## 2023-02-20 DIAGNOSIS — M79.604 RIGHT LEG PAIN: ICD-10-CM

## 2023-02-20 DIAGNOSIS — I50.32 CHRONIC DIASTOLIC CONGESTIVE HEART FAILURE: ICD-10-CM

## 2023-02-20 DIAGNOSIS — E78.5 HYPERLIPIDEMIA, UNSPECIFIED HYPERLIPIDEMIA TYPE: ICD-10-CM

## 2023-02-20 DIAGNOSIS — I10 PRIMARY HYPERTENSION: ICD-10-CM

## 2023-02-20 DIAGNOSIS — R60.9 EDEMA, UNSPECIFIED TYPE: ICD-10-CM

## 2023-02-20 DIAGNOSIS — I26.99 RECURRENT PULMONARY EMBOLI: ICD-10-CM

## 2023-02-20 DIAGNOSIS — I10 PRIMARY HYPERTENSION: Primary | ICD-10-CM

## 2023-02-20 DIAGNOSIS — R53.1 RIGHT SIDED WEAKNESS: ICD-10-CM

## 2023-02-20 DIAGNOSIS — R06.02 SOB (SHORTNESS OF BREATH): ICD-10-CM

## 2023-02-20 DIAGNOSIS — E66.01 MORBID OBESITY WITH BMI OF 40.0-44.9, ADULT: ICD-10-CM

## 2023-02-20 DIAGNOSIS — R55 SYNCOPE AND COLLAPSE: ICD-10-CM

## 2023-02-20 DIAGNOSIS — I71.40 ABDOMINAL AORTIC ANEURYSM (AAA) WITHOUT RUPTURE, UNSPECIFIED PART: ICD-10-CM

## 2023-02-20 DIAGNOSIS — Z86.73 HISTORY OF CVA (CEREBROVASCULAR ACCIDENT): ICD-10-CM

## 2023-02-20 DIAGNOSIS — C50.411 MALIGNANT NEOPLASM OF UPPER-OUTER QUADRANT OF RIGHT BREAST IN FEMALE, ESTROGEN RECEPTOR POSITIVE: ICD-10-CM

## 2023-02-20 PROCEDURE — 3080F DIAST BP >= 90 MM HG: CPT | Mod: HCNC,CPTII,S$GLB, | Performed by: STUDENT IN AN ORGANIZED HEALTH CARE EDUCATION/TRAINING PROGRAM

## 2023-02-20 PROCEDURE — 4010F ACE/ARB THERAPY RXD/TAKEN: CPT | Mod: HCNC,CPTII,S$GLB, | Performed by: STUDENT IN AN ORGANIZED HEALTH CARE EDUCATION/TRAINING PROGRAM

## 2023-02-20 PROCEDURE — 1157F PR ADVANCE CARE PLAN OR EQUIV PRESENT IN MEDICAL RECORD: ICD-10-PCS | Mod: HCNC,CPTII,S$GLB, | Performed by: STUDENT IN AN ORGANIZED HEALTH CARE EDUCATION/TRAINING PROGRAM

## 2023-02-20 PROCEDURE — 3008F PR BODY MASS INDEX (BMI) DOCUMENTED: ICD-10-PCS | Mod: HCNC,CPTII,S$GLB, | Performed by: STUDENT IN AN ORGANIZED HEALTH CARE EDUCATION/TRAINING PROGRAM

## 2023-02-20 PROCEDURE — 1101F PT FALLS ASSESS-DOCD LE1/YR: CPT | Mod: HCNC,CPTII,S$GLB, | Performed by: STUDENT IN AN ORGANIZED HEALTH CARE EDUCATION/TRAINING PROGRAM

## 2023-02-20 PROCEDURE — 3077F PR MOST RECENT SYSTOLIC BLOOD PRESSURE >= 140 MM HG: ICD-10-PCS | Mod: HCNC,CPTII,S$GLB, | Performed by: STUDENT IN AN ORGANIZED HEALTH CARE EDUCATION/TRAINING PROGRAM

## 2023-02-20 PROCEDURE — 3008F BODY MASS INDEX DOCD: CPT | Mod: HCNC,CPTII,S$GLB, | Performed by: STUDENT IN AN ORGANIZED HEALTH CARE EDUCATION/TRAINING PROGRAM

## 2023-02-20 PROCEDURE — 99999 PR PBB SHADOW E&M-EST. PATIENT-LVL IV: CPT | Mod: PBBFAC,HCNC,, | Performed by: STUDENT IN AN ORGANIZED HEALTH CARE EDUCATION/TRAINING PROGRAM

## 2023-02-20 PROCEDURE — 3077F SYST BP >= 140 MM HG: CPT | Mod: HCNC,CPTII,S$GLB, | Performed by: STUDENT IN AN ORGANIZED HEALTH CARE EDUCATION/TRAINING PROGRAM

## 2023-02-20 PROCEDURE — 3080F PR MOST RECENT DIASTOLIC BLOOD PRESSURE >= 90 MM HG: ICD-10-PCS | Mod: HCNC,CPTII,S$GLB, | Performed by: STUDENT IN AN ORGANIZED HEALTH CARE EDUCATION/TRAINING PROGRAM

## 2023-02-20 PROCEDURE — 1125F AMNT PAIN NOTED PAIN PRSNT: CPT | Mod: HCNC,CPTII,S$GLB, | Performed by: STUDENT IN AN ORGANIZED HEALTH CARE EDUCATION/TRAINING PROGRAM

## 2023-02-20 PROCEDURE — 99214 OFFICE O/P EST MOD 30 MIN: CPT | Mod: HCNC,S$GLB,, | Performed by: STUDENT IN AN ORGANIZED HEALTH CARE EDUCATION/TRAINING PROGRAM

## 2023-02-20 PROCEDURE — 3288F FALL RISK ASSESSMENT DOCD: CPT | Mod: HCNC,CPTII,S$GLB, | Performed by: STUDENT IN AN ORGANIZED HEALTH CARE EDUCATION/TRAINING PROGRAM

## 2023-02-20 PROCEDURE — 1159F MED LIST DOCD IN RCRD: CPT | Mod: HCNC,CPTII,S$GLB, | Performed by: STUDENT IN AN ORGANIZED HEALTH CARE EDUCATION/TRAINING PROGRAM

## 2023-02-20 PROCEDURE — 99999 PR PBB SHADOW E&M-EST. PATIENT-LVL IV: ICD-10-PCS | Mod: PBBFAC,HCNC,, | Performed by: STUDENT IN AN ORGANIZED HEALTH CARE EDUCATION/TRAINING PROGRAM

## 2023-02-20 PROCEDURE — 1157F ADVNC CARE PLAN IN RCRD: CPT | Mod: HCNC,CPTII,S$GLB, | Performed by: STUDENT IN AN ORGANIZED HEALTH CARE EDUCATION/TRAINING PROGRAM

## 2023-02-20 PROCEDURE — 3288F PR FALLS RISK ASSESSMENT DOCUMENTED: ICD-10-PCS | Mod: HCNC,CPTII,S$GLB, | Performed by: STUDENT IN AN ORGANIZED HEALTH CARE EDUCATION/TRAINING PROGRAM

## 2023-02-20 PROCEDURE — 4010F PR ACE/ARB THEARPY RXD/TAKEN: ICD-10-PCS | Mod: HCNC,CPTII,S$GLB, | Performed by: STUDENT IN AN ORGANIZED HEALTH CARE EDUCATION/TRAINING PROGRAM

## 2023-02-20 PROCEDURE — 93005 ELECTROCARDIOGRAM TRACING: CPT | Mod: HCNC

## 2023-02-20 PROCEDURE — 93010 EKG 12-LEAD: ICD-10-PCS | Mod: HCNC,,, | Performed by: INTERNAL MEDICINE

## 2023-02-20 PROCEDURE — 99214 PR OFFICE/OUTPT VISIT, EST, LEVL IV, 30-39 MIN: ICD-10-PCS | Mod: HCNC,S$GLB,, | Performed by: STUDENT IN AN ORGANIZED HEALTH CARE EDUCATION/TRAINING PROGRAM

## 2023-02-20 PROCEDURE — 1159F PR MEDICATION LIST DOCUMENTED IN MEDICAL RECORD: ICD-10-PCS | Mod: HCNC,CPTII,S$GLB, | Performed by: STUDENT IN AN ORGANIZED HEALTH CARE EDUCATION/TRAINING PROGRAM

## 2023-02-20 PROCEDURE — 93010 ELECTROCARDIOGRAM REPORT: CPT | Mod: HCNC,,, | Performed by: INTERNAL MEDICINE

## 2023-02-20 PROCEDURE — 1101F PR PT FALLS ASSESS DOC 0-1 FALLS W/OUT INJ PAST YR: ICD-10-PCS | Mod: HCNC,CPTII,S$GLB, | Performed by: STUDENT IN AN ORGANIZED HEALTH CARE EDUCATION/TRAINING PROGRAM

## 2023-02-20 PROCEDURE — 1125F PR PAIN SEVERITY QUANTIFIED, PAIN PRESENT: ICD-10-PCS | Mod: HCNC,CPTII,S$GLB, | Performed by: STUDENT IN AN ORGANIZED HEALTH CARE EDUCATION/TRAINING PROGRAM

## 2023-02-20 RX ORDER — RANOLAZINE 1000 MG/1
1000 TABLET, EXTENDED RELEASE ORAL 2 TIMES DAILY
Qty: 180 TABLET | Refills: 3 | Status: SHIPPED | OUTPATIENT
Start: 2023-02-20 | End: 2023-05-08 | Stop reason: SDUPTHER

## 2023-02-20 RX ORDER — SACUBITRIL AND VALSARTAN 24; 26 MG/1; MG/1
1 TABLET, FILM COATED ORAL 2 TIMES DAILY
Qty: 90 TABLET | Refills: 3 | Status: ON HOLD | OUTPATIENT
Start: 2023-02-20 | End: 2023-03-15 | Stop reason: HOSPADM

## 2023-02-20 NOTE — PROGRESS NOTES
Subjective:   Patient ID:  Susu Luther is a 73 y.o. female who presents for follow up of Shortness of Breath and Chest Pain      12/1/20  69 yo female, care establish. Prior cardiologist Dr ackerman   [facility] HTN, CVA (2009), Right breast CA, Lumpectomy 3/2019, h/o PE off OAC 2 yrs ago.  AAA, s/p transcutaneous patch by Dr. Bonds, obesity knee OA imbalanced walker dependent  C/o SOB after walking few steps and dizziness. Had vision issue 1 month ago due to uncontrolled HTN  No chest pain  Sleeps with 2 pillows  Decent appetites  Leg calf pain worse at night  No smoking/drinking  ekg today NSR LVH.    ECH normal EF, grade II DD, LAE and PAP 56 mmHG   Chest CTA negative for PE  BP high    A1c controlled    S/p Open subpectoral lymph node biopsy on the right on 12/02/2020 by Dr. Starks  Still right chest, under arm and shoudler supersensitive pain      Shortness of Breath  Associated symptoms include chest pain. Pertinent negatives include no abdominal pain, claudication, fever, headaches, neck pain, orthopnea, PND, rash, sputum production, syncope, vomiting or wheezing.   Chest Pain   Pertinent negatives include no abdominal pain, back pain, claudication, cough, diaphoresis, dizziness, fever, headaches, irregular heartbeat, malaise/fatigue, nausea, near-syncope, numbness, orthopnea, palpitations, PND, sputum production, syncope, vomiting or weakness.   Her past medical history is significant for CHF.   Pertinent negatives for past medical history include no seizures.   Congestive Heart Failure  Associated symptoms include chest pain. Pertinent negatives include no abdominal pain, claudication, near-syncope or palpitations.   2/28/22  Patient was admitted to [facility] for shortness of breath.  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  Started on heparin drip in transition to oral anticoagulation, now on Eliquis.  Recurrent PE.  On Femara. Has  another lump in breast on right side, recently seen on PET scan.   Due to TANNER, was told to stop hctz, telmisartan.  She has ran out of clonidine. Does not take the ASA, hydralazine, amlodipine.   Blood pressure normotensive.  Reports severe headaches.  Has been out of Fioricet.  Echocardiogram with normal EF  Reports shortness of breath, chest heaviness mostly right-sided.      3/14/22  Still having SOB due to PE.  No bleeding issues while on eliquis.  Chest pain is substernal to right sided.   Lasix doesn't seem to help.   A reports intermittent leg pain right > left.  DVT ultrasound in-hospital with no evidence of DVT.  Following Hematology-Oncology.  Patient does not want surgery if needed for possible breast cancer.  Denies syncope, fever, chills.         4/18/22  Comes in with daughter who lives in [place].  Concerned that she may oxygen issues and may need home oxygen. O2 98%  Reports LE swelling and SOB  Reports chest pain comes on with SOB, did not have chest pain prior to PE  Has not been on lasix, has been held  Reports balancing issues- can do PT once symptoms improve   Denies syncope, fever, chills.       5/16/22  Has been feeling weak last couple days  Feels chest tightness with walking, also CURIEL- feels worse than before. 97% O2 in clinic   Reports dizziness after taking medications   BP low today   Says recurrent cancer may be spreading   No bleeding on eliquis   Reports orthopnea, PND.  Not feeling well- Does not want to the hospital     Denies syncope.      6/13/22  Not feeling well today  Reports chest pain and SOB worsening over the last 2 weeks   Ddimer trending, downTroponin neg and BNP nl on 5/16/22  EKG today without significant abnormalities   Reports recent diagnosis of breast cancer is progressing  Reports right-sided arm weakness  Patient has been increasingly stressed    Denies syncope, lower extremity swelling.      7/6/2022   went to emergency room 6/13/2022, was worked up for stroke  MRI  brain negative  Repeat CTA chest without PE, right-sided mass 6 cm, stable  All blood pressure medications.  Due to hypotension  Started taking Lasix today  Has significant lower extremity pain bilateral, reports blue feet at times      8/9/22  Virtual visit  Ambulates with walker   Had a fall recently due to syncopal episode, went to urgent care, negative workup per patient  Syncopal event occurred while standing up  Last visit Lasix was increased, patient reports increased thirst and water intake  Has also been taking carvedilol, reports low blood pressure  Chest pain worsened after syncopal event, has bruising on her body      9/7/22  Reports leg swelling, left  Has been taking eliquis also   Restarted taking lasix 2 weeks ago  Recently started on lyrica   Still wearing cardiac monitor  Still having SOB and chest discomfort  U/S arterial w/o significant stenosis   BP elevated, high at home  Lost 8 lbs since 9/1      10/12/22  Virtual visit  Doing well, still getting chest pain   Still has shortness of breath but has improved   Was able to go to a football game without any issues   DVT ultrasound without thrombus   Has been treated for gout, improvement of toe pain  Has been having intermittent blood pressure elevated readings at home        2/20/23  Stress test normal   BP elevated now  Has been more tired  Drinking lots of water  Chest pain has improved with imdur  SOB stable   Has chronic pain and chronic fatigue         EKG 2/20/23 NSR, PVCs, LAD, minimal LVH  EKG 6/13/22 NSR, LAD, LVH, 93 bpm, qtc 455 ms  EKG 5/16/22 SB, incomplete RBBB, LVH, septal infarct, qtc 422 ms   Echo 2/28/22  The left ventricle is normal in size with concentric hypertrophy and normal systolic function.  The estimated ejection fraction is 60%.  Normal left ventricular diastolic function.  Normal right ventricular size with normal right ventricular systolic function.  Mild to moderate tricuspid regurgitation.  Normal central venous  pressure (3 mmHg).  The estimated PA systolic pressure is 36 mmHg.       Echo 2019  CONCLUSIONS     1 - Mild left atrial enlargement.     2 - Concentric hypertrophy.     3 - No wall motion abnormalities.     4 - Normal left ventricular systolic function (EF 60-65%).     5 - Impaired LV relaxation, elevated LAP (grade 2 diastolic dysfunction).     6 - Normal right ventricular systolic function .     7 - The estimated PA systolic pressure is 36 mmHg.     8 - Mild tricuspid regurgitation.        Past Medical History:   Diagnosis Date    Chronic diastolic congestive heart failure 2020    Encounter for blood transfusion     History of repair of aneurysm of abdominal aorta using endovascular stent graft     DR Bonds     of psychiatric care     Hypertension     Major depressive disorder, single episode, moderate with anxious distress 2020    Malignant neoplasm of upper-outer quadrant of right breast in female, estrogen receptor positive 3/14/2019    radiation    Psychiatric problem     Sleep apnea     Sleep difficulties     Stroke     no residual defect    Therapy        Past Surgical History:   Procedure Laterality Date    APPENDECTOMY      AXILLARY NODE DISSECTION Right 2020    Procedure: LYMPHADENECTOMY, AXILLARY;  Surgeon: Artemio Starks MD;  Location: [facility] OR;  Service: General;  Laterality: Right;    BIOPSY OF AXILLARY NODE Right 2021    Procedure: BIOPSY, LYMPH NODE, AXILLARY;  Surgeon: Artemio Sutton MD;  Location: [address];  Service: General;  Laterality: Right;    BREAST BIOPSY      2019    BREAST LUMPECTOMY      2019     SECTION      x 1    OOPHORECTOMY      right     SENTINEL LYMPH NODE BIOPSY Right 2019    Procedure: BIOPSY, LYMPH NODE, SENTINEL;  Surgeon: Artemio Starks MD;  Location: [facility];  Service: General;  Laterality: Right;    splenic artery aneurysm repair      TONSILLECTOMY         Social History     Tobacco Use    Smoking status: Never     "Smokeless tobacco: Never   Substance Use Topics    Alcohol use: No    Drug use: No       Family History   Problem Relation Age of Onset    Diabetes Maternal Grandmother     Diabetes Maternal Grandfather     Diabetes Mother     Hypertension Mother     Sickle cell anemia Daughter     Breast cancer Neg Hx     Colon cancer Neg Hx     Ovarian cancer Neg Hx          Review of Systems   Constitutional: Negative for decreased appetite, diaphoresis, fever, malaise/fatigue and night sweats.   HENT:  Negative for nosebleeds.    Eyes:  Negative for blurred vision and double vision.   Cardiovascular:  Positive for chest pain and dyspnea on exertion. Negative for claudication, irregular heartbeat, near-syncope, orthopnea, palpitations, paroxysmal nocturnal dyspnea and syncope.   Respiratory:  Negative for cough, sleep disturbances due to breathing, snoring, sputum production and wheezing.    Endocrine: Negative for cold intolerance and polyuria.   Hematologic/Lymphatic: Does not bruise/bleed easily.   Skin:  Negative for rash.   Musculoskeletal:  Negative for back pain, falls, joint pain, joint swelling and neck pain.        Right chest breast and shoulder pain   Gastrointestinal:  Negative for abdominal pain, heartburn, nausea and vomiting.   Genitourinary:  Negative for dysuria, frequency and hematuria.   Neurological:  Negative for difficulty with concentration, dizziness, focal weakness, headaches, light-headedness, numbness, seizures and weakness.   Psychiatric/Behavioral:  Negative for depression, memory loss and substance abuse. The patient does not have insomnia.    Allergic/Immunologic: Negative for HIV exposure and hives.   Vitals:    02/20/23 1625   BP: (!) 160/90   BP Location: Right arm   Patient Position: Sitting   BP Method: Medium (Manual)   Pulse: 60   SpO2: 97%   Weight: (!) 137.6 kg (303 lb 5.7 oz)   Height: 6' 1" (1.854 m)           Objective:   Physical Exam  Constitutional:       Comments: Mild distress.  "   HENT:      Head: Normocephalic.   Eyes:      Pupils: Pupils are equal, round, and reactive to light.   Neck:      Thyroid: No thyromegaly.      Vascular: Normal carotid pulses. No carotid bruit or JVD.   Cardiovascular:      Rate and Rhythm: Normal rate and regular rhythm. No extrasystoles are present.     Chest Wall: PMI is not displaced.      Pulses: Normal pulses.      Heart sounds: Normal heart sounds. No murmur heard.    No gallop. No S3 sounds.   Pulmonary:      Effort: No respiratory distress.      Breath sounds: Normal breath sounds. No stridor.   Abdominal:      General: Bowel sounds are normal.      Palpations: Abdomen is soft.      Tenderness: There is no abdominal tenderness. There is no rebound.   Musculoskeletal:         General: Normal range of motion.      Comments: Tender to palpitation   Skin:     Findings: No rash.   Neurological:      Mental Status: She is alert and oriented to person, place, and time.   Psychiatric:         Behavior: Behavior normal.       Lab Results   Component Value Date    CHOL 229 (H) 04/18/2022    CHOL 221 (H) 10/19/2020    CHOL 231 (H) 08/25/2020     Lab Results   Component Value Date    HDL 45 04/18/2022    HDL 55 10/19/2020    HDL 49 08/25/2020     Lab Results   Component Value Date    LDLCALC 150.4 04/18/2022    LDLCALC 137.4 10/19/2020    LDLCALC 135.6 08/25/2020     Lab Results   Component Value Date    TRIG 168 (H) 04/18/2022    TRIG 143 10/19/2020    TRIG 232 (H) 08/25/2020     Lab Results   Component Value Date    CHOLHDL 19.7 (L) 04/18/2022    CHOLHDL 24.9 10/19/2020    CHOLHDL 21.2 08/25/2020       Chemistry        Component Value Date/Time     01/11/2023 1102    K 4.0 01/11/2023 1102     01/11/2023 1102    CO2 24 01/11/2023 1102    BUN 20 01/11/2023 1102    CREATININE 1.2 01/11/2023 1102     01/11/2023 1102        Component Value Date/Time    CALCIUM 9.5 01/11/2023 1102    ALKPHOS 124 01/11/2023 1102    AST 15 01/11/2023 1102    ALT 14  01/11/2023 1102    BILITOT 0.4 01/11/2023 1102    ESTGFRAFRICA 37 (A) 07/06/2022 1312    EGFRNONAA 32 (A) 07/06/2022 1312          Lab Results   Component Value Date    HGBA1C 5.9 (H) 05/18/2019     Lab Results   Component Value Date    TSH 1.460 10/18/2021     Lab Results   Component Value Date    INR 0.9 02/15/2022    INR 1.0 11/10/2020    INR 1.0 05/19/2019     Lab Results   Component Value Date    WBC 8.34 01/11/2023    HGB 11.5 (L) 01/11/2023    HCT 37.4 01/11/2023    MCV 79 (L) 01/11/2023     01/11/2023     BMP  Sodium   Date Value Ref Range Status   01/11/2023 142 136 - 145 mmol/L Final     Potassium   Date Value Ref Range Status   01/11/2023 4.0 3.5 - 5.1 mmol/L Final     Chloride   Date Value Ref Range Status   01/11/2023 109 95 - 110 mmol/L Final     CO2   Date Value Ref Range Status   01/11/2023 24 23 - 29 mmol/L Final     BUN   Date Value Ref Range Status   01/11/2023 20 8 - 23 mg/dL Final     Creatinine   Date Value Ref Range Status   01/11/2023 1.2 0.5 - 1.4 mg/dL Final     Calcium   Date Value Ref Range Status   01/11/2023 9.5 8.7 - 10.5 mg/dL Final     Anion Gap   Date Value Ref Range Status   01/11/2023 9 8 - 16 mmol/L Final     eGFR if    Date Value Ref Range Status   07/06/2022 37 (A) >60 mL/min/1.73 m^2 Final     eGFR if non    Date Value Ref Range Status   07/06/2022 32 (A) >60 mL/min/1.73 m^2 Final     Comment:     Calculation used to obtain the estimated glomerular filtration  rate (eGFR) is the CKD-EPI equation.        BNP  @LABRCNTIP(BNP,BNPTRIAGEBLO)@  @LABRCNTIP(troponini)@  CrCl cannot be calculated (Patient's most recent lab result is older than the maximum 7 days allowed.).  No results found in the last 24 hours.  No results found in the last 24 hours.  No results found in the last 24 hours.    Assessment:      1. Primary hypertension    2. Morbid obesity with BMI of 40.0-44.9, adult    3. Edema, unspecified type    4. Chronic diastolic congestive  heart failure    5. History of CVA (cerebrovascular accident)    6. Hyperlipidemia, unspecified hyperlipidemia type    7. Malignant neoplasm of upper-outer quadrant of right breast in female, estrogen receptor positive    8. Recurrent pulmonary emboli    9. Right sided weakness    10. Right leg pain    11. Syncope and collapse    12. Abdominal aortic aneurysm (AAA) without rupture, unspecified part                Plan:     Left leg swelling/edema  Continue lasix 20 mg b.i.d.  Venous ultrasound without DVT    Chest pain/shortness of breath- improved  Repeat CTA PE without evidence of PE, stable right-sided mass  Lexiscan 1/23 normal stress test     Syncope- resolved   Possibly due to dehydration  14 day monitor- 7.27% PACs, essentially asymptomatic    Hypertension  elevated  continue carvedilol  Start entresto   Start taking hydralazine TID  Restart digital Medicine- pending     History of right breast cancer  Enlarging lymph nodes that could be causing significant pain/discomfort  Follows with Hematology-Oncology    Diastolic heart failure  Echo 2/22 with normal EF, mild-mod TR    Right leg pain-stable  DVT ultrasound 10/22 without evidence of DVT   Normal ABIs  Arterial ultrasound 8/22 without significant stenosis    Recurrent pulmonary embolus   Recurrent PE as of 2/22  Continue OAC    History of CVA  Carotid ultrasound no significant stenosis, recent MRI brain no abnormality  Currently not on aspirin as she is taking Eliquis  Continue statin    HLD   as of 4/22  Continue statin  Recheck lipid panel     AAA s/p transcutaneous patch  Stable    Morbid obesity, BMI > 40  Low-salt, low-fat diet  Exercise as tolerated      All labs and cardiac procedures reviewed.      Return to clinic 1 month    Arlene Hobbs MD  Cardiology Staff

## 2023-02-24 ENCOUNTER — PATIENT MESSAGE (OUTPATIENT)
Dept: INTERNAL MEDICINE | Facility: CLINIC | Age: 73
End: 2023-02-24
Payer: MEDICARE

## 2023-02-28 ENCOUNTER — LAB VISIT (OUTPATIENT)
Dept: LAB | Facility: HOSPITAL | Age: 73
End: 2023-02-28
Attending: STUDENT IN AN ORGANIZED HEALTH CARE EDUCATION/TRAINING PROGRAM
Payer: MEDICARE

## 2023-02-28 ENCOUNTER — CLINICAL SUPPORT (OUTPATIENT)
Dept: INTERNAL MEDICINE | Facility: CLINIC | Age: 73
End: 2023-02-28
Payer: MEDICARE

## 2023-02-28 ENCOUNTER — PATIENT MESSAGE (OUTPATIENT)
Dept: INTERNAL MEDICINE | Facility: CLINIC | Age: 73
End: 2023-02-28

## 2023-02-28 VITALS — DIASTOLIC BLOOD PRESSURE: 111 MMHG | SYSTOLIC BLOOD PRESSURE: 205 MMHG

## 2023-02-28 DIAGNOSIS — E78.5 HYPERLIPIDEMIA, UNSPECIFIED HYPERLIPIDEMIA TYPE: ICD-10-CM

## 2023-02-28 LAB
CHOLEST SERPL-MCNC: 234 MG/DL (ref 120–199)
CHOLEST/HDLC SERPL: 5.3 {RATIO} (ref 2–5)
HDLC SERPL-MCNC: 44 MG/DL (ref 40–75)
HDLC SERPL: 18.8 % (ref 20–50)
LDLC SERPL CALC-MCNC: 151.8 MG/DL (ref 63–159)
NONHDLC SERPL-MCNC: 190 MG/DL
TRIGL SERPL-MCNC: 191 MG/DL (ref 30–150)

## 2023-02-28 PROCEDURE — 80061 LIPID PANEL: CPT | Mod: HCNC | Performed by: STUDENT IN AN ORGANIZED HEALTH CARE EDUCATION/TRAINING PROGRAM

## 2023-02-28 PROCEDURE — 36415 COLL VENOUS BLD VENIPUNCTURE: CPT | Mod: HCNC | Performed by: STUDENT IN AN ORGANIZED HEALTH CARE EDUCATION/TRAINING PROGRAM

## 2023-02-28 PROCEDURE — 99999 PR PBB SHADOW E&M-EST. PATIENT-LVL III: ICD-10-PCS | Mod: PBBFAC,HCNC,,

## 2023-02-28 PROCEDURE — 99999 PR PBB SHADOW E&M-EST. PATIENT-LVL III: CPT | Mod: PBBFAC,HCNC,,

## 2023-02-28 RX ORDER — ATORVASTATIN CALCIUM 20 MG/1
20 TABLET, FILM COATED ORAL DAILY
Qty: 30 TABLET | Refills: 11 | Status: SHIPPED | OUTPATIENT
Start: 2023-02-28 | End: 2023-05-08 | Stop reason: SDUPTHER

## 2023-02-28 RX ORDER — HYDRALAZINE HYDROCHLORIDE 100 MG/1
100 TABLET, FILM COATED ORAL EVERY 8 HOURS
Qty: 1 TABLET | Refills: 0 | Status: ON HOLD | OUTPATIENT
Start: 2023-02-28 | End: 2023-03-15 | Stop reason: HOSPADM

## 2023-02-28 NOTE — PROGRESS NOTES
Patient bp was rechecked it was 205/111  Patient states she is feeling better.   She will update us with an additional cp reading.  Two week follow up is scheduled with Alexus.

## 2023-02-28 NOTE — PROGRESS NOTES
Pt came in for BP check 217/126   C/o headache, blurred vision,upset stomach.   Pt states she did not take BP medications this morning as she was fasting for blood work.   Pt states she wants to try and get BP down before going to ER.   Alexus KNIGHT sent in hydralazine 100mg into pharmacy here.   Will recheck BP in 30 mins after taking hydralazine.

## 2023-03-01 ENCOUNTER — PATIENT MESSAGE (OUTPATIENT)
Dept: INTERNAL MEDICINE | Facility: CLINIC | Age: 73
End: 2023-03-01
Payer: MEDICARE

## 2023-03-01 RX ORDER — HYDRALAZINE HYDROCHLORIDE 100 MG/1
100 TABLET, FILM COATED ORAL EVERY 8 HOURS
Qty: 1 TABLET | Refills: 0 | OUTPATIENT
Start: 2023-03-01

## 2023-03-07 ENCOUNTER — PATIENT MESSAGE (OUTPATIENT)
Dept: INTERNAL MEDICINE | Facility: CLINIC | Age: 73
End: 2023-03-07
Payer: MEDICARE

## 2023-03-07 ENCOUNTER — PATIENT MESSAGE (OUTPATIENT)
Dept: PHARMACY | Facility: CLINIC | Age: 73
End: 2023-03-07
Payer: MEDICARE

## 2023-03-09 ENCOUNTER — OFFICE VISIT (OUTPATIENT)
Dept: INTERNAL MEDICINE | Facility: CLINIC | Age: 73
End: 2023-03-09
Payer: MEDICARE

## 2023-03-09 ENCOUNTER — HOSPITAL ENCOUNTER (INPATIENT)
Facility: HOSPITAL | Age: 73
LOS: 4 days | Discharge: HOME HEALTH | DRG: 309 | End: 2023-03-15
Attending: EMERGENCY MEDICINE | Admitting: INTERNAL MEDICINE
Payer: MEDICARE

## 2023-03-09 ENCOUNTER — TELEPHONE (OUTPATIENT)
Dept: ADMINISTRATIVE | Facility: HOSPITAL | Age: 73
End: 2023-03-09
Payer: MEDICARE

## 2023-03-09 DIAGNOSIS — R55 NEAR SYNCOPE: ICD-10-CM

## 2023-03-09 DIAGNOSIS — R00.0 TACHYCARDIA: ICD-10-CM

## 2023-03-09 DIAGNOSIS — I10 HYPERTENSION, UNSPECIFIED TYPE: Primary | ICD-10-CM

## 2023-03-09 DIAGNOSIS — I95.1 ORTHOSTATIC HYPOTENSION: ICD-10-CM

## 2023-03-09 DIAGNOSIS — R07.9 CHEST PAIN: ICD-10-CM

## 2023-03-09 DIAGNOSIS — R42 DIZZINESS: ICD-10-CM

## 2023-03-09 DIAGNOSIS — C50.411 MALIGNANT NEOPLASM OF UPPER-OUTER QUADRANT OF RIGHT BREAST IN FEMALE, ESTROGEN RECEPTOR POSITIVE: Primary | Chronic | ICD-10-CM

## 2023-03-09 DIAGNOSIS — I50.32 CHRONIC DIASTOLIC CONGESTIVE HEART FAILURE: ICD-10-CM

## 2023-03-09 DIAGNOSIS — Z17.0 MALIGNANT NEOPLASM OF UPPER-OUTER QUADRANT OF RIGHT BREAST IN FEMALE, ESTROGEN RECEPTOR POSITIVE: Primary | Chronic | ICD-10-CM

## 2023-03-09 DIAGNOSIS — I10 HTN (HYPERTENSION): ICD-10-CM

## 2023-03-09 DIAGNOSIS — R00.1 SYMPTOMATIC BRADYCARDIA: ICD-10-CM

## 2023-03-09 DIAGNOSIS — R55 SYNCOPE, UNSPECIFIED SYNCOPE TYPE: ICD-10-CM

## 2023-03-09 DIAGNOSIS — E66.9 OBESITY, UNSPECIFIED CLASSIFICATION, UNSPECIFIED OBESITY TYPE, UNSPECIFIED WHETHER SERIOUS COMORBIDITY PRESENT: ICD-10-CM

## 2023-03-09 DIAGNOSIS — F41.9 ANXIETY: ICD-10-CM

## 2023-03-09 DIAGNOSIS — N63.10 MASS OF RIGHT BREAST, UNSPECIFIED QUADRANT: ICD-10-CM

## 2023-03-09 DIAGNOSIS — I95.9 HYPOTENSION, UNSPECIFIED HYPOTENSION TYPE: ICD-10-CM

## 2023-03-09 LAB
ALBUMIN SERPL BCP-MCNC: 3.7 G/DL (ref 3.5–5.2)
ALP SERPL-CCNC: 131 U/L (ref 55–135)
ALT SERPL W/O P-5'-P-CCNC: 12 U/L (ref 10–44)
ANION GAP SERPL CALC-SCNC: 13 MMOL/L (ref 8–16)
AST SERPL-CCNC: 13 U/L (ref 10–40)
BASOPHILS # BLD AUTO: 0.07 K/UL (ref 0–0.2)
BASOPHILS NFR BLD: 0.6 % (ref 0–1.9)
BILIRUB SERPL-MCNC: 0.4 MG/DL (ref 0.1–1)
BNP SERPL-MCNC: 29 PG/ML (ref 0–99)
BUN SERPL-MCNC: 29 MG/DL (ref 8–23)
CALCIUM SERPL-MCNC: 10.2 MG/DL (ref 8.7–10.5)
CHLORIDE SERPL-SCNC: 105 MMOL/L (ref 95–110)
CK SERPL-CCNC: 46 U/L (ref 20–180)
CO2 SERPL-SCNC: 21 MMOL/L (ref 23–29)
CREAT SERPL-MCNC: 2.1 MG/DL (ref 0.5–1.4)
DIFFERENTIAL METHOD: ABNORMAL
EOSINOPHIL # BLD AUTO: 0.1 K/UL (ref 0–0.5)
EOSINOPHIL NFR BLD: 0.5 % (ref 0–8)
ERYTHROCYTE [DISTWIDTH] IN BLOOD BY AUTOMATED COUNT: 17 % (ref 11.5–14.5)
EST. GFR  (NO RACE VARIABLE): 25 ML/MIN/1.73 M^2
GLUCOSE SERPL-MCNC: 114 MG/DL (ref 70–110)
HCT VFR BLD AUTO: 43.3 % (ref 37–48.5)
HGB BLD-MCNC: 14 G/DL (ref 12–16)
IMM GRANULOCYTES # BLD AUTO: 0.04 K/UL (ref 0–0.04)
IMM GRANULOCYTES NFR BLD AUTO: 0.3 % (ref 0–0.5)
LYMPHOCYTES # BLD AUTO: 3.1 K/UL (ref 1–4.8)
LYMPHOCYTES NFR BLD: 26.8 % (ref 18–48)
MCH RBC QN AUTO: 25 PG (ref 27–31)
MCHC RBC AUTO-ENTMCNC: 32.3 G/DL (ref 32–36)
MCV RBC AUTO: 77 FL (ref 82–98)
MONOCYTES # BLD AUTO: 0.9 K/UL (ref 0.3–1)
MONOCYTES NFR BLD: 8.1 % (ref 4–15)
NEUTROPHILS # BLD AUTO: 7.3 K/UL (ref 1.8–7.7)
NEUTROPHILS NFR BLD: 63.7 % (ref 38–73)
NRBC BLD-RTO: 0 /100 WBC
PLATELET # BLD AUTO: 313 K/UL (ref 150–450)
PMV BLD AUTO: 9.4 FL (ref 9.2–12.9)
POTASSIUM SERPL-SCNC: 4.3 MMOL/L (ref 3.5–5.1)
PROT SERPL-MCNC: 8.2 G/DL (ref 6–8.4)
RBC # BLD AUTO: 5.61 M/UL (ref 4–5.4)
SODIUM SERPL-SCNC: 139 MMOL/L (ref 136–145)
TROPONIN I SERPL DL<=0.01 NG/ML-MCNC: 0.02 NG/ML (ref 0–0.03)
WBC # BLD AUTO: 11.54 K/UL (ref 3.9–12.7)

## 2023-03-09 PROCEDURE — 1160F PR REVIEW ALL MEDS BY PRESCRIBER/CLIN PHARMACIST DOCUMENTED: ICD-10-PCS | Mod: HCNC,CPTII,S$GLB, | Performed by: NURSE PRACTITIONER

## 2023-03-09 PROCEDURE — 4010F ACE/ARB THERAPY RXD/TAKEN: CPT | Mod: HCNC,CPTII,S$GLB, | Performed by: NURSE PRACTITIONER

## 2023-03-09 PROCEDURE — 99999 PR PBB SHADOW E&M-EST. PATIENT-LVL III: CPT | Mod: PBBFAC,HCNC,, | Performed by: NURSE PRACTITIONER

## 2023-03-09 PROCEDURE — 85025 COMPLETE CBC W/AUTO DIFF WBC: CPT | Mod: HCNC | Performed by: EMERGENCY MEDICINE

## 2023-03-09 PROCEDURE — 25000003 PHARM REV CODE 250: Mod: HCNC | Performed by: EMERGENCY MEDICINE

## 2023-03-09 PROCEDURE — 83880 ASSAY OF NATRIURETIC PEPTIDE: CPT | Mod: HCNC | Performed by: EMERGENCY MEDICINE

## 2023-03-09 PROCEDURE — G0378 HOSPITAL OBSERVATION PER HR: HCPCS | Mod: HCNC

## 2023-03-09 PROCEDURE — 1160F RVW MEDS BY RX/DR IN RCRD: CPT | Mod: HCNC,CPTII,S$GLB, | Performed by: NURSE PRACTITIONER

## 2023-03-09 PROCEDURE — 84484 ASSAY OF TROPONIN QUANT: CPT | Mod: HCNC | Performed by: EMERGENCY MEDICINE

## 2023-03-09 PROCEDURE — 96374 THER/PROPH/DIAG INJ IV PUSH: CPT | Mod: HCNC

## 2023-03-09 PROCEDURE — 63600175 PHARM REV CODE 636 W HCPCS: Mod: HCNC | Performed by: EMERGENCY MEDICINE

## 2023-03-09 PROCEDURE — 99214 PR OFFICE/OUTPT VISIT, EST, LEVL IV, 30-39 MIN: ICD-10-PCS | Mod: HCNC,S$GLB,, | Performed by: NURSE PRACTITIONER

## 2023-03-09 PROCEDURE — 96361 HYDRATE IV INFUSION ADD-ON: CPT | Mod: HCNC

## 2023-03-09 PROCEDURE — 4010F PR ACE/ARB THEARPY RXD/TAKEN: ICD-10-PCS | Mod: HCNC,CPTII,S$GLB, | Performed by: NURSE PRACTITIONER

## 2023-03-09 PROCEDURE — 25000003 PHARM REV CODE 250: Mod: HCNC | Performed by: INTERNAL MEDICINE

## 2023-03-09 PROCEDURE — 80053 COMPREHEN METABOLIC PANEL: CPT | Mod: HCNC | Performed by: EMERGENCY MEDICINE

## 2023-03-09 PROCEDURE — 1157F PR ADVANCE CARE PLAN OR EQUIV PRESENT IN MEDICAL RECORD: ICD-10-PCS | Mod: HCNC,CPTII,S$GLB, | Performed by: NURSE PRACTITIONER

## 2023-03-09 PROCEDURE — 1157F ADVNC CARE PLAN IN RCRD: CPT | Mod: HCNC,CPTII,S$GLB, | Performed by: NURSE PRACTITIONER

## 2023-03-09 PROCEDURE — 1159F MED LIST DOCD IN RCRD: CPT | Mod: HCNC,CPTII,S$GLB, | Performed by: NURSE PRACTITIONER

## 2023-03-09 PROCEDURE — 99214 OFFICE O/P EST MOD 30 MIN: CPT | Mod: HCNC,S$GLB,, | Performed by: NURSE PRACTITIONER

## 2023-03-09 PROCEDURE — 1159F PR MEDICATION LIST DOCUMENTED IN MEDICAL RECORD: ICD-10-PCS | Mod: HCNC,CPTII,S$GLB, | Performed by: NURSE PRACTITIONER

## 2023-03-09 PROCEDURE — 99999 PR PBB SHADOW E&M-EST. PATIENT-LVL III: ICD-10-PCS | Mod: PBBFAC,HCNC,, | Performed by: NURSE PRACTITIONER

## 2023-03-09 PROCEDURE — 99900035 HC TECH TIME PER 15 MIN (STAT): Mod: HCNC

## 2023-03-09 PROCEDURE — 99285 EMERGENCY DEPT VISIT HI MDM: CPT | Mod: 25,HCNC

## 2023-03-09 PROCEDURE — 82550 ASSAY OF CK (CPK): CPT | Mod: HCNC | Performed by: EMERGENCY MEDICINE

## 2023-03-09 RX ORDER — DULOXETIN HYDROCHLORIDE 30 MG/1
30 CAPSULE, DELAYED RELEASE ORAL DAILY
COMMUNITY
Start: 2023-02-03 | End: 2023-03-17

## 2023-03-09 RX ORDER — ZOLPIDEM TARTRATE 5 MG/1
5 TABLET ORAL NIGHTLY PRN
Status: DISCONTINUED | OUTPATIENT
Start: 2023-03-09 | End: 2023-03-15 | Stop reason: HOSPADM

## 2023-03-09 RX ORDER — PREGABALIN 75 MG/1
75 CAPSULE ORAL 3 TIMES DAILY
Status: DISCONTINUED | OUTPATIENT
Start: 2023-03-09 | End: 2023-03-10

## 2023-03-09 RX ORDER — ACETAMINOPHEN 325 MG/1
650 TABLET ORAL EVERY 4 HOURS PRN
Status: DISCONTINUED | OUTPATIENT
Start: 2023-03-09 | End: 2023-03-09

## 2023-03-09 RX ORDER — DEXTROSE 40 %
30 GEL (GRAM) ORAL
Status: DISCONTINUED | OUTPATIENT
Start: 2023-03-09 | End: 2023-03-15 | Stop reason: HOSPADM

## 2023-03-09 RX ORDER — LETROZOLE 2.5 MG/1
2.5 TABLET, FILM COATED ORAL DAILY
Status: DISCONTINUED | OUTPATIENT
Start: 2023-03-10 | End: 2023-03-15 | Stop reason: HOSPADM

## 2023-03-09 RX ORDER — TIZANIDINE 4 MG/1
4 TABLET ORAL EVERY 8 HOURS
Status: DISCONTINUED | OUTPATIENT
Start: 2023-03-09 | End: 2023-03-10

## 2023-03-09 RX ORDER — SODIUM CHLORIDE 9 MG/ML
INJECTION, SOLUTION INTRAVENOUS CONTINUOUS
Status: DISCONTINUED | OUTPATIENT
Start: 2023-03-09 | End: 2023-03-10

## 2023-03-09 RX ORDER — HYDRALAZINE HYDROCHLORIDE 20 MG/ML
10 INJECTION INTRAMUSCULAR; INTRAVENOUS
Status: COMPLETED | OUTPATIENT
Start: 2023-03-09 | End: 2023-03-09

## 2023-03-09 RX ORDER — MAG HYDROX/ALUMINUM HYD/SIMETH 200-200-20
30 SUSPENSION, ORAL (FINAL DOSE FORM) ORAL 4 TIMES DAILY PRN
Status: DISCONTINUED | OUTPATIENT
Start: 2023-03-09 | End: 2023-03-15 | Stop reason: HOSPADM

## 2023-03-09 RX ORDER — OXYCODONE AND ACETAMINOPHEN 7.5; 325 MG/1; MG/1
1 TABLET ORAL EVERY 6 HOURS PRN
Status: DISCONTINUED | OUTPATIENT
Start: 2023-03-09 | End: 2023-03-15 | Stop reason: HOSPADM

## 2023-03-09 RX ORDER — CLONIDINE HYDROCHLORIDE 0.1 MG/1
0.1 TABLET ORAL 2 TIMES DAILY PRN
Status: DISCONTINUED | OUTPATIENT
Start: 2023-03-09 | End: 2023-03-15 | Stop reason: HOSPADM

## 2023-03-09 RX ORDER — SODIUM CHLORIDE 0.9 % (FLUSH) 0.9 %
10 SYRINGE (ML) INJECTION EVERY 12 HOURS PRN
Status: DISCONTINUED | OUTPATIENT
Start: 2023-03-09 | End: 2023-03-15 | Stop reason: HOSPADM

## 2023-03-09 RX ORDER — TALC
6 POWDER (GRAM) TOPICAL NIGHTLY PRN
Status: DISCONTINUED | OUTPATIENT
Start: 2023-03-09 | End: 2023-03-09

## 2023-03-09 RX ORDER — DULOXETIN HYDROCHLORIDE 30 MG/1
30 CAPSULE, DELAYED RELEASE ORAL DAILY
Status: DISCONTINUED | OUTPATIENT
Start: 2023-03-10 | End: 2023-03-10

## 2023-03-09 RX ORDER — SIMETHICONE 80 MG
1 TABLET,CHEWABLE ORAL 4 TIMES DAILY PRN
Status: DISCONTINUED | OUTPATIENT
Start: 2023-03-09 | End: 2023-03-15 | Stop reason: HOSPADM

## 2023-03-09 RX ORDER — ACETAMINOPHEN 325 MG/1
650 TABLET ORAL EVERY 4 HOURS PRN
Status: DISCONTINUED | OUTPATIENT
Start: 2023-03-09 | End: 2023-03-15 | Stop reason: HOSPADM

## 2023-03-09 RX ORDER — BUTALBITAL, ACETAMINOPHEN AND CAFFEINE 50; 325; 40 MG/1; MG/1; MG/1
1 TABLET ORAL EVERY 4 HOURS PRN
Status: DISCONTINUED | OUTPATIENT
Start: 2023-03-09 | End: 2023-03-15 | Stop reason: HOSPADM

## 2023-03-09 RX ORDER — NALOXONE HCL 0.4 MG/ML
0.4 VIAL (ML) INJECTION
Status: DISCONTINUED | OUTPATIENT
Start: 2023-03-09 | End: 2023-03-15 | Stop reason: HOSPADM

## 2023-03-09 RX ORDER — ONDANSETRON 2 MG/ML
4 INJECTION INTRAMUSCULAR; INTRAVENOUS EVERY 8 HOURS PRN
Status: DISCONTINUED | OUTPATIENT
Start: 2023-03-09 | End: 2023-03-15 | Stop reason: HOSPADM

## 2023-03-09 RX ORDER — GLUCAGON 1 MG
1 KIT INJECTION
Status: DISCONTINUED | OUTPATIENT
Start: 2023-03-09 | End: 2023-03-15 | Stop reason: HOSPADM

## 2023-03-09 RX ORDER — DEXTROSE 40 %
15 GEL (GRAM) ORAL
Status: DISCONTINUED | OUTPATIENT
Start: 2023-03-09 | End: 2023-03-15 | Stop reason: HOSPADM

## 2023-03-09 RX ORDER — RANOLAZINE 500 MG/1
1000 TABLET, EXTENDED RELEASE ORAL 2 TIMES DAILY
Status: DISCONTINUED | OUTPATIENT
Start: 2023-03-09 | End: 2023-03-15 | Stop reason: HOSPADM

## 2023-03-09 RX ORDER — CARVEDILOL 12.5 MG/1
25 TABLET ORAL 2 TIMES DAILY WITH MEALS
Status: DISCONTINUED | OUTPATIENT
Start: 2023-03-09 | End: 2023-03-10

## 2023-03-09 RX ORDER — OXYBUTYNIN CHLORIDE 5 MG/1
5 TABLET ORAL DAILY
Status: DISCONTINUED | OUTPATIENT
Start: 2023-03-10 | End: 2023-03-13

## 2023-03-09 RX ORDER — HYDRALAZINE HYDROCHLORIDE 50 MG/1
100 TABLET, FILM COATED ORAL EVERY 8 HOURS
Status: DISCONTINUED | OUTPATIENT
Start: 2023-03-09 | End: 2023-03-10

## 2023-03-09 RX ORDER — ALPRAZOLAM 0.5 MG/1
0.5 TABLET ORAL 2 TIMES DAILY PRN
Status: DISCONTINUED | OUTPATIENT
Start: 2023-03-09 | End: 2023-03-15 | Stop reason: HOSPADM

## 2023-03-09 RX ORDER — HYDRALAZINE HYDROCHLORIDE 20 MG/ML
10 INJECTION INTRAMUSCULAR; INTRAVENOUS EVERY 6 HOURS PRN
Status: DISCONTINUED | OUTPATIENT
Start: 2023-03-09 | End: 2023-03-15 | Stop reason: HOSPADM

## 2023-03-09 RX ADMIN — HYDRALAZINE HYDROCHLORIDE 100 MG: 50 TABLET, FILM COATED ORAL at 09:03

## 2023-03-09 RX ADMIN — BUTALBITAL, ACETAMINOPHEN AND CAFFEINE 1 TABLET: 50; 325; 40 TABLET ORAL at 11:03

## 2023-03-09 RX ADMIN — APIXABAN 5 MG: 2.5 TABLET, FILM COATED ORAL at 09:03

## 2023-03-09 RX ADMIN — HYDRALAZINE HYDROCHLORIDE 10 MG: 20 INJECTION, SOLUTION INTRAMUSCULAR; INTRAVENOUS at 06:03

## 2023-03-09 RX ADMIN — CLONIDINE HYDROCHLORIDE 0.1 MG: 0.1 TABLET ORAL at 05:03

## 2023-03-09 RX ADMIN — PREGABALIN 75 MG: 75 CAPSULE ORAL at 09:03

## 2023-03-09 RX ADMIN — RANOLAZINE 1000 MG: 500 TABLET, EXTENDED RELEASE ORAL at 09:03

## 2023-03-09 RX ADMIN — SODIUM CHLORIDE 1000 ML: 9 INJECTION, SOLUTION INTRAVENOUS at 04:03

## 2023-03-09 RX ADMIN — CARVEDILOL 25 MG: 12.5 TABLET, FILM COATED ORAL at 05:03

## 2023-03-09 RX ADMIN — BUTALBITAL, ACETAMINOPHEN AND CAFFEINE 1 TABLET: 50; 325; 40 TABLET ORAL at 05:03

## 2023-03-09 RX ADMIN — TIZANIDINE 4 MG: 4 TABLET ORAL at 09:03

## 2023-03-09 RX ADMIN — ZOLPIDEM TARTRATE 5 MG: 5 TABLET ORAL at 10:03

## 2023-03-09 RX ADMIN — SODIUM CHLORIDE: 9 INJECTION, SOLUTION INTRAVENOUS at 07:03

## 2023-03-09 NOTE — PHARMACY MED REC
"Admission Medication History     The home medication history was taken by Jairon Payna.    You may go to "Admission" then "Reconcile Home Medications" tabs to review and/or act upon these items.     The home medication list has been updated by the Pharmacy department.   Please read ALL comments highlighted in yellow.   Please address this information as you see fit.    Feel free to contact us if you have any questions or require assistance.      The medications listed below were removed from the home medication list. Please reorder if appropriate:  Patient reports no longer taking the following medication(s):  PREDNISONE 10MG    Medications listed below were obtained from: Patient/family and Analytic software- [employer]  (Not in a hospital admission)      Jairon Payan  [id number]    Current Outpatient Medications on File Prior to Encounter   Medication Sig Dispense Refill Last Dose    ALPRAZolam (XANAX) 0.5 MG tablet Take 1 tablet (0.5 mg total) by mouth 2 (two) times daily as needed for Anxiety. 180 tablet 0 Past Week    apixaban (ELIQUIS) 5 mg Tab Take 1 tablet (5 mg total) by mouth 2 (two) times daily. 180 tablet 3 3/9/2023    atorvastatin (LIPITOR) 20 MG tablet Take 1 tablet (20 mg total) by mouth once daily. 30 tablet 11 3/9/2023    busPIRone (BUSPAR) 15 MG tablet Take 1 tablet (15 mg total) by mouth 3 (three) times daily as needed (anxiety). 90 tablet 0 3/8/2023    butalbital-acetaminophen-caffeine -40 mg (FIORICET, ESGIC) -40 mg per tablet TAKE 1 TABLET DAILY AS NEEDED FOR PAIN OR HEADACHES. 30 tablet 1 Past Week    carvediloL (COREG) 25 MG tablet Take 1 tablet (25 mg total) by mouth 2 (two) times daily with meals. 30 tablet 6 3/9/2023    cholecalciferol, vitamin D3, 1,250 mcg (50,000 unit) capsule Take 1 capsule (50,000 Units total) by mouth once a week. 12 capsule 0 Past Week    cloNIDine (CATAPRES) 0.1 MG tablet Take 1 tablet (0.1 mg total) by mouth 2 (two) times daily as needed " (BP>170/95). 60 tablet 1 3/9/2023    diclofenac sodium (VOLTAREN) 1 % Gel Apply 2 grams topically once daily. 400 g 1 Past Week    doxazosin (CARDURA) 4 MG tablet Take 1 tablet (4 mg total) by mouth every evening. 90 tablet 0 3/9/2023    DULoxetine (CYMBALTA) 30 MG capsule Take 30 mg by mouth once daily.   3/9/2023    furosemide (LASIX) 20 MG tablet Take 1 tablet by mouth BID 90 tablet 3 3/9/2023    hydrALAZINE (APRESOLINE) 100 MG tablet Take 1 tablet (100 mg total) by mouth every 8 (eight) hours. 1 tablet 0 3/9/2023    hydroCHLOROthiazide (HYDRODIURIL) 50 MG tablet TAKE 1 TABLET ONCE DAILY. 90 tablet 1 3/9/2023    hydrOXYzine HCL (ATARAX) 25 MG tablet TAKE 1 TABLET EVERY EVENING 90 tablet 0 Past Week    letrozole (FEMARA) 2.5 mg Tab Take 1 tablet (2.5 mg total) by mouth once daily. 90 tablet 1 3/9/2023    LIDOcaine (LIDODERM) 5 % Place 2 patches onto the skin once daily. Remove & Discard patch within 12 hours or as directed by MD 90 patch 1 Past Month    multivitamin (THERAGRAN) per tablet Take 1 tablet by mouth once daily.   3/9/2023    oxybutynin (DITROPAN) 5 MG Tab Take 1 tablet (5 mg total) by mouth once daily. 90 tablet 1 3/9/2023    oxyCODONE-acetaminophen (PERCOCET) 7.5-325 mg per tablet Take 1 tablet by mouth every 6 (six) hours as needed for Pain. 60 tablet 0 Past Month    pregabalin (LYRICA) 75 MG capsule Take 1 capsule (75 mg total) by mouth 3 (three) times daily. 90 capsule 2 3/9/2023    ranolazine (RANEXA) 1,000 mg Tb12 Take 1 tablet (1,000 mg total) by mouth 2 (two) times daily. 180 tablet 3 3/9/2023    sacubitriL-valsartan (ENTRESTO) 24-26 mg per tablet Take 1 tablet by mouth 2 (two) times daily. 90 tablet 3 3/9/2023    tiZANidine (ZANAFLEX) 4 MG tablet Take 1 tablet (4 mg total) by mouth every 8 (eight) hours. 90 tablet 1 3/9/2023    zolpidem (AMBIEN) 5 MG Tab Take 1 tablet (5 mg total) by mouth nightly as needed (sleep). 90 tablet 1 3/8/2023                           .

## 2023-03-09 NOTE — PROGRESS NOTES
Susu DANIELLE Ashkan  03/13/2023  [id number]    Peace Arias MD  Patient Care Team:  Peace Arias MD as PCP - General (Internal Medicine)  Wilda Lopez LMSW as  (Hematology and Oncology)  Elana Couch LPN as Care Coordinator  Jose Clarke MD (Inactive) as Consulting Physician (Hematology and Oncology)  Arlene Hobbs MD as Consulting Physician (Cardiology)  Raul Boyd OD as Consulting Physician (Optometry)  Cecily Aviles, PhD as Consulting Physician (Psychiatry)  Blank Cortez NP as Nurse Practitioner (Pulmonary Disease)  Adi Wellington III, MD as Consulting Physician (Radiation Oncology)  Rubi Sanchez MD as Consulting Physician (Dermatology)  Rogelio Vincent MD as Consulting Physician (Vascular Surgery)          Visit Type:  an urgent visit for a new problem  Chief Complaint:  No chief complaint on file.      History of Present Illness:    Susu Luther  73 y.o. Black or  female    Here for follow up on hypertension.    Reports taking medications as instructed.  Not taking any OTC meds.    Past Medical History:   Diagnosis Date    Chronic diastolic congestive heart failure 8/25/2020    Encounter for blood transfusion     History of repair of aneurysm of abdominal aorta using endovascular stent graft     DR Bonds     of psychiatric care     Hypertension     Major depressive disorder, single episode, moderate with anxious distress 1/14/2020    Malignant neoplasm of upper-outer quadrant of right breast in female, estrogen receptor positive 3/14/2019    radiation    Psychiatric problem     Sleep apnea     Sleep difficulties     Stroke 2009    no residual defect    Therapy        No current facility-administered medications for this visit.  No current outpatient medications on file.    Facility-Administered Medications Ordered in Other Visits:     acetaminophen tablet 650 mg, 650 mg, Oral, Q4H PRN, Magdiel Saez MD, 650 mg at  03/11/23 1253    ALPRAZolam tablet 0.5 mg, 0.5 mg, Oral, BID PRN, Magdiel Saez MD, 0.5 mg at 03/12/23 2110    aluminum-magnesium hydroxide-simethicone 200-200-20 mg/5 mL suspension 30 mL, 30 mL, Oral, QID PRN, Magdiel Saez MD    amLODIPine tablet 10 mg, 10 mg, Oral, Daily, Leana Ewing MD    apixaban tablet 5 mg, 5 mg, Oral, BID, Magdiel Saez MD, 5 mg at 03/13/23 0824    atropine injection 0.5 mg, 0.5 mg, Intravenous, Q5 Min PRN, Gita Mariano MD    busPIRone tablet 15 mg, 15 mg, Oral, TID PRN, Magdiel Saez MD    butalbital-acetaminophen-caffeine -40 mg per tablet 1 tablet, 1 tablet, Oral, Q4H PRN, Magdiel Saez MD, 1 tablet at 03/13/23 0352    chlorhexidine 0.12 % solution 15 mL, 15 mL, Mouth/Throat, BID, Kei Radford, EUGENE, 15 mL at 03/13/23 0823    cloNIDine tablet 0.1 mg, 0.1 mg, Oral, BID PRN, Magdiel Saez MD, 0.1 mg at 03/13/23 0613    dextrose 10% bolus 125 mL 125 mL, 12.5 g, Intravenous, PRN, Magdiel Saez MD    dextrose 10% bolus 250 mL 250 mL, 25 g, Intravenous, PRN, Magdiel Saez MD    dextrose 40 % gel 15,000 mg, 15 g, Oral, PRN, Magdile Saez MD    dextrose 40 % gel 30,000 mg, 30 g, Oral, PRN, Magdiel Saez MD    famotidine tablet 20 mg, 20 mg, Oral, BID, Jarad Yousif MD, 20 mg at 03/13/23 0824    furosemide tablet 20 mg, 20 mg, Oral, BID, Gita Mariano MD, 20 mg at 03/13/23 0824    glucagon (human recombinant) injection 1 mg, 1 mg, Intramuscular, PRN, Magdiel Saez MD    hydrALAZINE injection 10 mg, 10 mg, Intravenous, Q6H PRN, Magdiel Saez MD, 10 mg at 03/13/23 0321    letrozole tablet 2.5 mg, 2.5 mg, Oral, Daily, Magdiel Saez MD, 2.5 mg at 03/13/23 0823    mupirocin 2 % ointment, , Nasal, BID, Kei Radford NP, Given at 03/13/23 0825    naloxone 0.4 mg/mL injection 0.4 mg, 0.4 mg, Intravenous, PRN, Magdiel Saez MD    ondansetron injection 4 mg, 4 mg, Intravenous, Q8H PRN, Magdiel  MARCIA Saez MD, 4 mg at 03/13/23 0352    oxybutynin tablet 5 mg, 5 mg, Oral, Daily, Magdiel Saez MD, 5 mg at 03/13/23 0825    oxyCODONE-acetaminophen 7.5-325 mg per tablet 1 tablet, 1 tablet, Oral, Q6H PRN, Magdiel Saez MD, 1 tablet at 03/10/23 2242    polyethylene glycol packet 17 g, 17 g, Oral, Daily, Gita Mariano MD, 17 g at 03/13/23 0823    ranolazine 12 hr tablet 1,000 mg, 1,000 mg, Oral, BID, Magdiel Saez MD, 1,000 mg at 03/13/23 0824    sacubitriL-valsartan 24-26 mg per tablet 1 tablet, 1 tablet, Oral, BID, Gita Mariano MD, 1 tablet at 03/12/23 2106    simethicone chewable tablet 80 mg, 1 tablet, Oral, QID PRN, Magdiel Saez MD    sodium chloride 0.9% flush 10 mL, 10 mL, Intravenous, Q12H PRN, Magdiel Saez MD    zolpidem tablet 5 mg, 5 mg, Oral, Nightly PRN, Magdiel Saez MD, 5 mg at 03/12/23 2234    Review of patient's allergies indicates:   Allergen Reactions    Pcn [penicillins] Hives       ROS: Denies dizziness, headache, chest pain, shortness of breath or edema    PE:  GEN: Alert and oriented, no acute distress  HEART: Normal S1 and S2, RRR, no lower extremity edema  LUNGS: Respirations unlabored, bilaterally clear to auscultation    ASSESSMENT/PLAN:  1. Hypertension      History:  Past Medical History:   Diagnosis Date    Chronic diastolic congestive heart failure 8/25/2020    Encounter for blood transfusion     History of repair of aneurysm of abdominal aorta using endovascular stent graft     DR Bonds    Hx of psychiatric care     Hypertension     Major depressive disorder, single episode, moderate with anxious distress 1/14/2020    Malignant neoplasm of upper-outer quadrant of right breast in female, estrogen receptor positive 3/14/2019    radiation    Psychiatric problem     Sleep apnea     Sleep difficulties     Stroke 2009    no residual defect    Therapy      Past Surgical History:   Procedure Laterality Date    APPENDECTOMY      AXILLARY  NODE DISSECTION Right 2020    Procedure: LYMPHADENECTOMY, AXILLARY;  Surgeon: Artemio Starks MD;  Location: [facility] OR;  Service: General;  Laterality: Right;    BIOPSY OF AXILLARY NODE Right 2021    Procedure: BIOPSY, LYMPH NODE, AXILLARY;  Surgeon: Artemio Sutton MD;  [facility];  Service: General;  Laterality: Right;    BREAST BIOPSY          BREAST LUMPECTOMY      2019     SECTION      x 1    OOPHORECTOMY      right     SENTINEL LYMPH NODE BIOPSY Right 2019    Procedure: BIOPSY, LYMPH NODE, SENTINEL;  Surgeon: Artemio Starks MD;  Location: [facility] OR;  Service: General;  Laterality: Right;    splenic artery aneurysm repair      TONSILLECTOMY       Family History   Problem Relation Age of Onset    Diabetes Maternal Grandmother     Diabetes Maternal Grandfather     Diabetes Mother     Hypertension Mother     Sickle cell anemia Daughter     Breast cancer Neg Hx     Colon cancer Neg Hx     Ovarian cancer Neg Hx      Social History     Socioeconomic History    Marital status:      Spouse name: Campos Luther    Number of children: 2   Tobacco Use    Smoking status: Never    Smokeless tobacco: Never   Substance and Sexual Activity    Alcohol use: No    Drug use: No    Sexual activity: Yes     Partners: Male     Birth control/protection: Post-menopausal     Social Determinants of Health     Financial Resource Strain: Medium Risk    Difficulty of Paying Living Expenses: Somewhat hard   Food Insecurity: No Food Insecurity    Worried About Running Out of Food in the Last Year: Never true    Ran Out of Food in the Last Year: Never true   Transportation Needs: No Transportation Needs    Lack of Transportation (Medical): No    Lack of Transportation (Non-Medical): No   Physical Activity: Inactive    Days of Exercise per Week: 0 days    Minutes of Exercise per Session: 0 min   Stress: Stress Concern Present    Feeling of Stress : Very much   Social Connections:  Socially Integrated    Frequency of Communication with Friends and Family: More than three times a week    Frequency of Social Gatherings with Friends and Family: Never    Attends Jewish Services: More than 4 times per year    Active Member of Clubs or Organizations: Yes    Attends Club or Organization Meetings: 1 to 4 times per year    Marital Status:    Housing Stability: Low Risk     Unable to Pay for Housing in the Last Year: No    Number of Places Lived in the Last Year: 1    Unstable Housing in the Last Year: No     Patient Active Problem List   Diagnosis    Shortness of breath on exertion    NILS (obstructive sleep apnea)    Malignant neoplasm of upper-outer quadrant of right breast in female, estrogen receptor positive    Hypertension    Morbid obesity with BMI of 45.0-49.9, adult    Headache    Circadian rhythm sleep disorder    Nocturnal hypoxemia    Major depressive disorder, single episode, moderate with anxious distress    Speech disturbance    Blurry vision    Psychological factors affecting medical condition    SOB (shortness of breath)    Lymphadenopathy    Porcelain gallbladder    Axillary mass    Splenic artery aneurysm    Chronic kidney disease, stage 3a    Other chest pain    Acute pulmonary embolism    TANNER (acute kidney injury)    Primary osteoarthritis of right hip    History of pulmonary embolism    Multiple thyroid nodules    History of prediabetes    Right sided weakness    Neuropathic pain    Neoplasm related pain    Chronic diastolic congestive heart failure    Alteration in speech    Bradycardia    Neurological complaint    Hypotension    Dizziness     Review of patient's allergies indicates:   Allergen Reactions    Pcn [penicillins] Hives       The following were reviewed at this visit: active problem list, medication list, allergies, family history, social history, and health maintenance.    Medications:  No current facility-administered medications on file prior to visit.      Current Outpatient Medications on File Prior to Visit   Medication Sig Dispense Refill    ALPRAZolam (XANAX) 0.5 MG tablet Take 1 tablet (0.5 mg total) by mouth 2 (two) times daily as needed for Anxiety. 180 tablet 0    apixaban (ELIQUIS) 5 mg Tab Take 1 tablet (5 mg total) by mouth 2 (two) times daily. 180 tablet 3    atorvastatin (LIPITOR) 20 MG tablet Take 1 tablet (20 mg total) by mouth once daily. 30 tablet 11    busPIRone (BUSPAR) 15 MG tablet Take 1 tablet (15 mg total) by mouth 3 (three) times daily as needed (anxiety). 90 tablet 0    butalbital-acetaminophen-caffeine -40 mg (FIORICET, ESGIC) -40 mg per tablet TAKE 1 TABLET DAILY AS NEEDED FOR PAIN OR HEADACHES. 30 tablet 1    carvediloL (COREG) 25 MG tablet Take 1 tablet (25 mg total) by mouth 2 (two) times daily with meals. 30 tablet 6    cholecalciferol, vitamin D3, 1,250 mcg (50,000 unit) capsule Take 1 capsule (50,000 Units total) by mouth once a week. 12 capsule 0    cloNIDine (CATAPRES) 0.1 MG tablet Take 1 tablet (0.1 mg total) by mouth 2 (two) times daily as needed (BP>170/95). 60 tablet 1    diclofenac sodium (VOLTAREN) 1 % Gel Apply 2 grams topically once daily. 400 g 1    doxazosin (CARDURA) 4 MG tablet Take 1 tablet (4 mg total) by mouth every evening. 90 tablet 0    furosemide (LASIX) 20 MG tablet Take 1 tablet by mouth BID 90 tablet 3    hydrALAZINE (APRESOLINE) 100 MG tablet Take 1 tablet (100 mg total) by mouth every 8 (eight) hours. 1 tablet 0    hydroCHLOROthiazide (HYDRODIURIL) 50 MG tablet TAKE 1 TABLET ONCE DAILY. 90 tablet 1    hydrOXYzine HCL (ATARAX) 25 MG tablet TAKE 1 TABLET EVERY EVENING 90 tablet 0    letrozole (FEMARA) 2.5 mg Tab Take 1 tablet (2.5 mg total) by mouth once daily. 90 tablet 1    LIDOcaine (LIDODERM) 5 % Place 2 patches onto the skin once daily. Remove & Discard patch within 12 hours or as directed by MD Conner patch 1    multivitamin (THERAGRAN) per tablet Take 1 tablet by mouth once daily.       oxybutynin (DITROPAN) 5 MG Tab Take 1 tablet (5 mg total) by mouth once daily. 90 tablet 1    oxyCODONE-acetaminophen (PERCOCET) 7.5-325 mg per tablet Take 1 tablet by mouth every 6 (six) hours as needed for Pain. 60 tablet 0    pregabalin (LYRICA) 75 MG capsule Take 1 capsule (75 mg total) by mouth 3 (three) times daily. 90 capsule 2    ranolazine (RANEXA) 1,000 mg Tb12 Take 1 tablet (1,000 mg total) by mouth 2 (two) times daily. 180 tablet 3    sacubitriL-valsartan (ENTRESTO) 24-26 mg per tablet Take 1 tablet by mouth 2 (two) times daily. 90 tablet 3    tiZANidine (ZANAFLEX) 4 MG tablet Take 1 tablet (4 mg total) by mouth every 8 (eight) hours. 90 tablet 1    zolpidem (AMBIEN) 5 MG Tab Take 1 tablet (5 mg total) by mouth nightly as needed (sleep). 90 tablet 1       Medications have been reviewed and reconciled with patient at this visit.  Barriers to medications reviewed with patient.    Adverse reactions to current medications reviewed with patient..    Over the counter medications reviewed and reconciled with patient.    Exam:  Wt Readings from Last 3 Encounters:   03/11/23 135.7 kg (299 lb 2.6 oz)   02/20/23 (!) 137.6 kg (303 lb 5.7 oz)   02/10/23 (!) 137.6 kg (303 lb 5.7 oz)     Temp Readings from Last 3 Encounters:   03/13/23 98 °F (36.7 °C) (Oral)   02/10/23 96 °F (35.6 °C) (Tympanic)   01/11/23 97.1 °F (36.2 °C) (Temporal)     BP Readings from Last 3 Encounters:   03/13/23 (!) 156/86   02/28/23 (!) 205/111   02/20/23 (!) 160/90     Pulse Readings from Last 3 Encounters:   03/13/23 75   02/20/23 60   02/10/23 65     There is no height or weight on file to calculate BMI.      Review of Systems   Constitutional:  Negative for fever and malaise/fatigue.   Respiratory:  Positive for shortness of breath. Negative for cough and wheezing.    Cardiovascular:  Positive for palpitations and leg swelling.   Gastrointestinal:  Negative for nausea.   Neurological:  Positive for dizziness, weakness and headaches. Negative  for speech change.   Psychiatric/Behavioral:  The patient is nervous/anxious.    All other systems reviewed and are negative.  Physical Exam  Vitals and nursing note reviewed.   Constitutional:       Appearance: Normal appearance. She is obese.   HENT:      Head: Normocephalic and atraumatic.      Right Ear: Tympanic membrane, ear canal and external ear normal.      Left Ear: Tympanic membrane, ear canal and external ear normal.      Nose: Nose normal.      Mouth/Throat:      Mouth: Mucous membranes are moist.      Pharynx: Oropharynx is clear.   Eyes:      Extraocular Movements: Extraocular movements intact.      Conjunctiva/sclera: Conjunctivae normal.      Pupils: Pupils are equal, round, and reactive to light.   Cardiovascular:      Rate and Rhythm: Tachycardia present. Rhythm irregular.      Pulses: Normal pulses.      Heart sounds: Normal heart sounds.   Pulmonary:      Effort: Pulmonary effort is normal.      Breath sounds: Normal breath sounds.   Abdominal:      General: Bowel sounds are normal.      Palpations: Abdomen is soft.   Musculoskeletal:         General: Normal range of motion.      Cervical back: Normal range of motion and neck supple.   Skin:     General: Skin is warm and dry.      Capillary Refill: Capillary refill takes less than 2 seconds.   Neurological:      General: No focal deficit present.      Mental Status: She is alert and oriented to person, place, and time.      GCS: GCS eye subscore is 4. GCS verbal subscore is 5. GCS motor subscore is 6.      Motor: Weakness present.      Gait: Gait abnormal.   Psychiatric:         Attention and Perception: Attention and perception normal.         Mood and Affect: Mood is anxious. Affect is tearful.         Speech: Speech normal.         Behavior: Behavior normal.         Thought Content: Thought content normal.         Cognition and Memory: Cognition and memory normal.         Judgment: Judgment normal.       Laboratory Reviewed ({Yes)  Lab  Results   Component Value Date    WBC 9.09 03/13/2023    HGB 12.7 03/13/2023    HCT 40.4 03/13/2023     03/13/2023    CHOL 234 (H) 02/28/2023    TRIG 191 (H) 02/28/2023    HDL 44 02/28/2023    ALT 12 03/11/2023    ALT 12 03/11/2023    AST 12 03/11/2023    AST 12 03/11/2023     03/13/2023    K 3.8 03/13/2023     03/13/2023    CREATININE 0.9 03/13/2023    BUN 9 03/13/2023    CO2 22 (L) 03/13/2023    TSH 1.691 03/10/2023    INR 1.1 03/11/2023    HGBA1C 5.9 (H) 05/18/2019       Diagnoses and all orders for this visit:    Hypertension, unspecified type    Syncope, unspecified syncope type    Anxiety    Tachycardia    Obesity, unspecified classification, unspecified obesity type, unspecified whether serious comorbidity present        Near syncopal episode when attempting to assist pt on the scale.   Pt became tachycardic, lightheaded and weak.  Pt was moved to a room placed on , accucheck, EKG and vital signs  Pt hypertensive  EMS called   Pt transported to [facility] via ambulance in no apparent distress      Care Plan/Goals: Reviewed    Goals         Blood Pressure < 150/90 (pt-stated)       Exercise at least 150 minutes per week.       Take at least one BP reading per week at various times of the day           Diagnoses and all orders for this visit:    Hypertension, unspecified type    Syncope, unspecified syncope type    Anxiety    Tachycardia    Obesity, unspecified classification, unspecified obesity type, unspecified whether serious comorbidity present       Follow up: Follow up for after hospital discharge .    After visit summary was printed and given to patient upon discharge today.  Patient goals and care plan are included in After Visit Summary.

## 2023-03-09 NOTE — SUBJECTIVE & OBJECTIVE
Past Medical History:   Diagnosis Date    Chronic diastolic congestive heart failure 2020    Encounter for blood transfusion     History of repair of aneurysm of abdominal aorta using endovascular stent graft     DR Bonds     of psychiatric care     Hypertension     Major depressive disorder, single episode, moderate with anxious distress 2020    Malignant neoplasm of upper-outer quadrant of right breast in female, estrogen receptor positive 3/14/2019    radiation    Psychiatric problem     Sleep apnea     Sleep difficulties     Stroke 2009    no residual defect    Therapy        Past Surgical History:   Procedure Laterality Date    APPENDECTOMY      AXILLARY NODE DISSECTION Right 2020    Procedure: LYMPHADENECTOMY, AXILLARY;  Surgeon: Artemio Starks MD;  Location: [facility];  Service: General;  Laterality: Right;    BIOPSY OF AXILLARY NODE Right 2021    Procedure: BIOPSY, LYMPH NODE, AXILLARY;  Surgeon: Artemio Sutton MD;  Location: [address];  Service: General;  Laterality: Right;    BREAST BIOPSY      2019    BREAST LUMPECTOMY      2019     SECTION      x 1    OOPHORECTOMY      right     SENTINEL LYMPH NODE BIOPSY Right 2019    Procedure: BIOPSY, LYMPH NODE, SENTINEL;  Surgeon: Artemio Starks MD;  Location: [facility];  Service: General;  Laterality: Right;    splenic artery aneurysm repair      TONSILLECTOMY         Review of patient's allergies indicates:   Allergen Reactions    Pcn [penicillins] Hives       No current facility-administered medications on file prior to encounter.     Current Outpatient Medications on File Prior to Encounter   Medication Sig    ALPRAZolam (XANAX) 0.5 MG tablet Take 1 tablet (0.5 mg total) by mouth 2 (two) times daily as needed for Anxiety.    apixaban (ELIQUIS) 5 mg Tab Take 1 tablet (5 mg total) by mouth 2 (two) times daily.    atorvastatin (LIPITOR) 20 MG tablet Take 1 tablet (20 mg total) by mouth once daily.    busPIRone  (BUSPAR) 15 MG tablet Take 1 tablet (15 mg total) by mouth 3 (three) times daily as needed (anxiety).    butalbital-acetaminophen-caffeine -40 mg (FIORICET, ESGIC) -40 mg per tablet TAKE 1 TABLET DAILY AS NEEDED FOR PAIN OR HEADACHES.    carvediloL (COREG) 25 MG tablet Take 1 tablet (25 mg total) by mouth 2 (two) times daily with meals.    cholecalciferol, vitamin D3, 1,250 mcg (50,000 unit) capsule Take 1 capsule (50,000 Units total) by mouth once a week.    cloNIDine (CATAPRES) 0.1 MG tablet Take 1 tablet (0.1 mg total) by mouth 2 (two) times daily as needed (BP>170/95).    diclofenac sodium (VOLTAREN) 1 % Gel Apply 2 grams topically once daily.    doxazosin (CARDURA) 4 MG tablet Take 1 tablet (4 mg total) by mouth every evening.    DULoxetine (CYMBALTA) 30 MG capsule Take 30 mg by mouth once daily.    furosemide (LASIX) 20 MG tablet Take 1 tablet by mouth BID    hydrALAZINE (APRESOLINE) 100 MG tablet Take 1 tablet (100 mg total) by mouth every 8 (eight) hours.    hydroCHLOROthiazide (HYDRODIURIL) 50 MG tablet TAKE 1 TABLET ONCE DAILY.    hydrOXYzine HCL (ATARAX) 25 MG tablet TAKE 1 TABLET EVERY EVENING    letrozole (FEMARA) 2.5 mg Tab Take 1 tablet (2.5 mg total) by mouth once daily.    LIDOcaine (LIDODERM) 5 % Place 2 patches onto the skin once daily. Remove & Discard patch within 12 hours or as directed by MD    multivitamin (THERAGRAN) per tablet Take 1 tablet by mouth once daily.    oxybutynin (DITROPAN) 5 MG Tab Take 1 tablet (5 mg total) by mouth once daily.    oxyCODONE-acetaminophen (PERCOCET) 7.5-325 mg per tablet Take 1 tablet by mouth every 6 (six) hours as needed for Pain.    pregabalin (LYRICA) 75 MG capsule Take 1 capsule (75 mg total) by mouth 3 (three) times daily.    ranolazine (RANEXA) 1,000 mg Tb12 Take 1 tablet (1,000 mg total) by mouth 2 (two) times daily.    sacubitriL-valsartan (ENTRESTO) 24-26 mg per tablet Take 1 tablet by mouth 2 (two) times daily.    tiZANidine (ZANAFLEX) 4  MG tablet Take 1 tablet (4 mg total) by mouth every 8 (eight) hours.    zolpidem (AMBIEN) 5 MG Tab Take 1 tablet (5 mg total) by mouth nightly as needed (sleep).    [DISCONTINUED] albuterol (VENTOLIN HFA) 90 mcg/actuation inhaler Inhale 2 puffs into the lungs every 4 (four) hours as needed for Shortness of Breath. (Patient not taking: Reported on 2/20/2023)    [DISCONTINUED] predniSONE (DELTASONE) 20 MG tablet Take 4 tabs PO day 1 then 3 tabs PO day 2 then 2 tabs PO day3 then take 1 tab PO day 4     Family History       Problem Relation (Age of Onset)    Diabetes Maternal Grandmother, Maternal Grandfather, Mother    Hypertension Mother    Sickle cell anemia Daughter          Tobacco Use    Smoking status: Never    Smokeless tobacco: Never   Substance and Sexual Activity    Alcohol use: No    Drug use: No    Sexual activity: Yes     Partners: Male     Birth control/protection: Post-menopausal     Review of Systems   Cardiovascular:  Positive for chest pain.   Neurological:  Positive for dizziness and headaches.   All other systems reviewed and are negative.  Objective:     Vital Signs (Most Recent):  Temp: 97.9 °F (36.6 °C) (03/09/23 1418)  Pulse: 88 (03/09/23 1541)  Resp: 18 (03/09/23 1541)  BP: 133/83 (03/09/23 1541)  SpO2: 98 % (03/09/23 1541) Vital Signs (24h Range):  Temp:  [97.9 °F (36.6 °C)] 97.9 °F (36.6 °C)  Pulse:  [88] 88  Resp:  [18] 18  SpO2:  [98 %-99 %] 98 %  BP: (122-133)/(83-85) 133/83     Weight: (!) 142 kg (313 lb)  Body mass index is 41.3 kg/m².    Physical Exam  HENT:      Head: Normocephalic and atraumatic.   Cardiovascular:      Rate and Rhythm: Normal rate and regular rhythm.      Heart sounds: No murmur heard.  Pulmonary:      Effort: Pulmonary effort is normal. No respiratory distress.      Breath sounds: Normal breath sounds. No wheezing.   Chest:      Comments: Small lump right breast around 12 o'clock, skin appears normal   Abdominal:      General: Bowel sounds are normal. There is no  distension.      Palpations: Abdomen is soft.      Tenderness: There is no abdominal tenderness.   Musculoskeletal:         General: No swelling.   Skin:     General: Skin is warm and dry.   Neurological:      Mental Status: She is alert and oriented to person, place, and time. Mental status is at baseline.           Significant Labs: All pertinent labs within the past 24 hours have been reviewed.  CBC:   Recent Labs   Lab 03/09/23  1508   WBC 11.54   HGB 14.0   HCT 43.3        CMP:   Recent Labs   Lab 03/09/23  1508      K 4.3      CO2 21*   *   BUN 29*   CREATININE 2.1*   CALCIUM 10.2   PROT 8.2   ALBUMIN 3.7   BILITOT 0.4   ALKPHOS 131   AST 13   ALT 12   ANIONGAP 13     Cardiac Markers:   Recent Labs   Lab 03/09/23  1508   BNP 29     Troponin:   Recent Labs   Lab 03/09/23  1508   TROPONINI 0.021       Significant Imaging: I have reviewed all pertinent imaging results/findings within the past 24 hours.

## 2023-03-09 NOTE — HPI
The patient is 72 yo female with past medical history of right breast cancer, hypertension, anxiety, AAA s/p repair, CVA, diastolic heart failure, TANNER, sleep apnea, and obesity who presented to the ED due to elevated blood pressure and dizziness. She reports her systolic blood pressure has been in the 200s at home. She has a headache and is dizzy. She went to her PCP to be evaluated and had  near syncopal episode. Patient is anxious and tearful when discussing her blood pressure. She is concerned about a lump she found in upper portion of her right breast. She states she forgot to mention it to Dr. Arias. Discussed ordering outpatient US as breast US are not usually done while patients are in the hospital. She verbalized understanding. Hospital medicine consulted. Patient noted to have TANNER. Patient placed in observation.

## 2023-03-09 NOTE — ED PROVIDER NOTES
SCRIBE #1 NOTE: I, Denise Mazariegos, am scribing for, and in the presence of, Melecio Arce MD. I have scribed the entire note.      History     Chief Complaint   Patient presents with    Dizziness     Dizziness for several days that is progressively getting worse. Pt. Had near syncope today while at the Dr. Office.      Review of patient's allergies indicates:   Allergen Reactions    Pcn [penicillins] Hives         History of Present Illness     HPI    3/9/2023, 2:33 PM  History obtained from the patient      History of Present Illness: Susu Luther is a 73 y.o. female patient with a PMHx of CHF, HTN, and stroke who presents to the Emergency Department for evaluation of hypertension. Pt states that she has not been able to control her blood pressure for about a week and it has been running in the 180s - 200 systolic. She reports that she has also been getting dizzy for the past couple of days and has been falling. Symptoms are constant and moderate in severity. No mitigating or exacerbating factors reported. Associated sxs include CP, SOB, and headaches. Patient denies any fever, chills, n/v/d, numbness, and all other sxs at this time. Pt is compliant with all her medications. No further complaints or concerns at this time.       Arrival mode: Ambulance Service    PCP: Peace Arias MD        Past Medical History:  Past Medical History:   Diagnosis Date    Chronic diastolic congestive heart failure 8/25/2020    Encounter for blood transfusion     History of repair of aneurysm of abdominal aorta using endovascular stent graft     DR Bonds    Hx of psychiatric care     Hypertension     Major depressive disorder, single episode, moderate with anxious distress 1/14/2020    Malignant neoplasm of upper-outer quadrant of right breast in female, estrogen receptor positive 3/14/2019    radiation    Psychiatric problem     Sleep apnea     Sleep difficulties     Stroke 2009    no residual defect    Therapy         Past Surgical History:  Past Surgical History:   Procedure Laterality Date    APPENDECTOMY      AXILLARY NODE DISSECTION Right 2020    Procedure: LYMPHADENECTOMY, AXILLARY;  Surgeon: Artemio Starks MD;  Location: [facility];  Service: General;  Laterality: Right;    BIOPSY OF AXILLARY NODE Right 2021    Procedure: BIOPSY, LYMPH NODE, AXILLARY;  Surgeon: Artemio Sutton MD;  Location: [address];  Service: General;  Laterality: Right;    BREAST BIOPSY          BREAST LUMPECTOMY      2019     SECTION      x 1    OOPHORECTOMY      right     SENTINEL LYMPH NODE BIOPSY Right 2019    Procedure: BIOPSY, LYMPH NODE, SENTINEL;  Surgeon: Artemio Starks MD;  Location: [facility];  Service: General;  Laterality: Right;    splenic artery aneurysm repair      TONSILLECTOMY           Family History:  Family History   Problem Relation Age of Onset    Diabetes Maternal Grandmother     Diabetes Maternal Grandfather     Diabetes Mother     Hypertension Mother     Sickle cell anemia Daughter     Breast cancer Neg Hx     Colon cancer Neg Hx     Ovarian cancer Neg Hx        Social History:  Social History     Tobacco Use    Smoking status: Never    Smokeless tobacco: Never   Substance and Sexual Activity    Alcohol use: No    Drug use: No    Sexual activity: Yes     Partners: Male     Birth control/protection: Post-menopausal        Review of Systems     Review of Systems   Constitutional:  Negative for chills and fever.   HENT:  Negative for sore throat.    Respiratory:  Positive for shortness of breath.    Cardiovascular:  Positive for chest pain.   Gastrointestinal:  Negative for diarrhea, nausea and vomiting.   Genitourinary:  Negative for dysuria.   Musculoskeletal:  Negative for back pain.   Skin:  Negative for rash.   Neurological:  Positive for dizziness and headaches. Negative for weakness and numbness.   Hematological:  Does not bruise/bleed easily.   All other systems reviewed and  are negative.     Physical Exam     Initial Vitals [03/09/23 1418]   BP Pulse Resp Temp SpO2   122/85 88 18 97.9 °F (36.6 °C) 99 %      MAP       --          Physical Exam  Nursing Notes and Vital Signs Reviewed.  Constitutional: Patient is in no acute distress.   Head: Atraumatic. Normocephalic.  Eyes: PERRL. EOM intact. Conjunctivae are not pale. No scleral icterus.  ENT: Mucous membranes are moist. Oropharynx is clear and symmetric.    Neck: Supple. Full ROM. No lymphadenopathy.  Cardiovascular: Regular rate. Regular rhythm. No murmurs, rubs, or gallops. Distal pulses are 2+ and symmetric.  Pulmonary/Chest: No respiratory distress. Clear to auscultation bilaterally. No wheezing or rales.  Abdominal: Soft and non-distended.  There is no tenderness.  No rebound, guarding, or rigidity. Good bowel sounds.  Genitourinary: No CVA tenderness  Musculoskeletal: Moves all extremities. No obvious deformities. No edema. No calf tenderness.  Skin: Warm and dry.  Neurological:  Alert, awake, and appropriate.  Normal speech.  No acute focal neurological deficits are appreciated.  Psychiatric: Normal affect. Good eye contact. Appropriate in content.     ED Course   Procedures  ED Vital Signs:  Vitals:    03/12/23 1815 03/12/23 1830 03/12/23 1931 03/12/23 2000   BP:    139/89   Pulse: 96 88 75 73   Resp: (!) 27 20 20 12   Temp:    98 °F (36.7 °C)   TempSrc:    Oral   SpO2: 98% 99% 100% 100%   Weight:       Height:        03/12/23 2002 03/12/23 2100 03/12/23 2106 03/12/23 2200   BP: 135/78  (!) 149/88    Pulse:  73  71   Resp:  13  (!) 26   Temp:       TempSrc:       SpO2:  100%  100%   Weight:       Height:        03/12/23 2202 03/12/23 2253 03/12/23 2300 03/13/23 0002   BP: (!) 159/82   (!) 157/74   Pulse:   69    Resp:  (!) 53     Temp:       TempSrc:       SpO2:   100%    Weight:       Height:        03/13/23 0035 03/13/23 0036 03/13/23 0321   BP:   (!) 260/129   Pulse: 68 68    Resp:      Temp: 98.1 °F (36.7 °C)     TempSrc:  Oral     SpO2: 100% 100%    Weight:      Height:          Abnormal Lab Results:  Labs Reviewed   CBC W/ AUTO DIFFERENTIAL - Abnormal; Notable for the following components:       Result Value    RBC 5.61 (*)     MCV 77 (*)     MCH 25.0 (*)     RDW 17.0 (*)     All other components within normal limits   COMPREHENSIVE METABOLIC PANEL - Abnormal; Notable for the following components:    CO2 21 (*)     Glucose 114 (*)     BUN 29 (*)     Creatinine 2.1 (*)     eGFR 25 (*)     All other components within normal limits   B-TYPE NATRIURETIC PEPTIDE   CK   TROPONIN I        All Lab Results:  Results for orders placed or performed during the hospital encounter of 03/09/23   CBC Auto Differential   Result Value Ref Range    WBC 11.54 3.90 - 12.70 K/uL    RBC 5.61 (H) 4.00 - 5.40 M/uL    Hemoglobin 14.0 12.0 - 16.0 g/dL    Hematocrit 43.3 37.0 - 48.5 %    MCV 77 (L) 82 - 98 fL    MCH 25.0 (L) 27.0 - 31.0 pg    MCHC 32.3 32.0 - 36.0 g/dL    RDW 17.0 (H) 11.5 - 14.5 %    Platelets 313 150 - 450 K/uL    MPV 9.4 9.2 - 12.9 fL    Immature Granulocytes 0.3 0.0 - 0.5 %    Gran # (ANC) 7.3 1.8 - 7.7 K/uL    Immature Grans (Abs) 0.04 0.00 - 0.04 K/uL    Lymph # 3.1 1.0 - 4.8 K/uL    Mono # 0.9 0.3 - 1.0 K/uL    Eos # 0.1 0.0 - 0.5 K/uL    Baso # 0.07 0.00 - 0.20 K/uL    nRBC 0 0 /100 WBC    Gran % 63.7 38.0 - 73.0 %    Lymph % 26.8 18.0 - 48.0 %    Mono % 8.1 4.0 - 15.0 %    Eosinophil % 0.5 0.0 - 8.0 %    Basophil % 0.6 0.0 - 1.9 %    Differential Method Automated    Comprehensive Metabolic Panel   Result Value Ref Range    Sodium 139 136 - 145 mmol/L    Potassium 4.3 3.5 - 5.1 mmol/L    Chloride 105 95 - 110 mmol/L    CO2 21 (L) 23 - 29 mmol/L    Glucose 114 (H) 70 - 110 mg/dL    BUN 29 (H) 8 - 23 mg/dL    Creatinine 2.1 (H) 0.5 - 1.4 mg/dL    Calcium 10.2 8.7 - 10.5 mg/dL    Total Protein 8.2 6.0 - 8.4 g/dL    Albumin 3.7 3.5 - 5.2 g/dL    Total Bilirubin 0.4 0.1 - 1.0 mg/dL    Alkaline Phosphatase 131 55 - 135 U/L    AST 13 10 - 40  U/L    ALT 12 10 - 44 U/L    Anion Gap 13 8 - 16 mmol/L    eGFR 25 (A) >60 mL/min/1.73 m^2   BNP   Result Value Ref Range    BNP 29 0 - 99 pg/mL   CK   Result Value Ref Range    CPK 46 20 - 180 U/L   Troponin I   Result Value Ref Range    Troponin I 0.021 0.000 - 0.026 ng/mL   CBC auto differential   Result Value Ref Range    WBC 10.05 3.90 - 12.70 K/uL    RBC 4.98 4.00 - 5.40 M/uL    Hemoglobin 12.3 12.0 - 16.0 g/dL    Hematocrit 40.7 37.0 - 48.5 %    MCV 82 82 - 98 fL    MCH 24.7 (L) 27.0 - 31.0 pg    MCHC 30.2 (L) 32.0 - 36.0 g/dL    RDW 17.6 (H) 11.5 - 14.5 %    Platelets 203 150 - 450 K/uL    MPV 10.1 9.2 - 12.9 fL    Immature Granulocytes 0.3 0.0 - 0.5 %    Gran # (ANC) 5.3 1.8 - 7.7 K/uL    Immature Grans (Abs) 0.03 0.00 - 0.04 K/uL    Lymph # 3.5 1.0 - 4.8 K/uL    Mono # 1.0 0.3 - 1.0 K/uL    Eos # 0.1 0.0 - 0.5 K/uL    Baso # 0.10 0.00 - 0.20 K/uL    nRBC 0 0 /100 WBC    Gran % 53.0 38.0 - 73.0 %    Lymph % 35.2 18.0 - 48.0 %    Mono % 9.5 4.0 - 15.0 %    Eosinophil % 1.0 0.0 - 8.0 %    Basophil % 1.0 0.0 - 1.9 %    Platelet Estimate Appears normal     Aniso Slight     Target Cells Occasional     Tear Drop Cells Occasional     Differential Method Automated    Comprehensive Metabolic Panel   Result Value Ref Range    Sodium 138 136 - 145 mmol/L    Potassium 4.1 3.5 - 5.1 mmol/L    Chloride 109 95 - 110 mmol/L    CO2 16 (L) 23 - 29 mmol/L    Glucose 100 70 - 110 mg/dL    BUN 31 (H) 8 - 23 mg/dL    Creatinine 1.6 (H) 0.5 - 1.4 mg/dL    Calcium 9.0 8.7 - 10.5 mg/dL    Total Protein 6.6 6.0 - 8.4 g/dL    Albumin 3.1 (L) 3.5 - 5.2 g/dL    Total Bilirubin 0.3 0.1 - 1.0 mg/dL    Alkaline Phosphatase 106 55 - 135 U/L    AST 10 10 - 40 U/L    ALT 10 10 - 44 U/L    Anion Gap 13 8 - 16 mmol/L    eGFR 34 (A) >60 mL/min/1.73 m^2   TSH   Result Value Ref Range    TSH 1.691 0.400 - 4.000 uIU/mL   Magnesium   Result Value Ref Range    Magnesium 1.9 1.6 - 2.6 mg/dL   Phosphorus   Result Value Ref Range    Phosphorus 4.2 2.7  - 4.5 mg/dL   Troponin I   Result Value Ref Range    Troponin I 0.015 0.000 - 0.026 ng/mL   Lactic Acid, Plasma   Result Value Ref Range    Lactate (Lactic Acid) 1.1 0.5 - 2.2 mmol/L   Basic Metabolic Panel   Result Value Ref Range    Sodium 138 136 - 145 mmol/L    Potassium 4.1 3.5 - 5.1 mmol/L    Chloride 107 95 - 110 mmol/L    CO2 18 (L) 23 - 29 mmol/L    Glucose 129 (H) 70 - 110 mg/dL    BUN 31 (H) 8 - 23 mg/dL    Creatinine 1.7 (H) 0.5 - 1.4 mg/dL    Calcium 9.1 8.7 - 10.5 mg/dL    Anion Gap 13 8 - 16 mmol/L    eGFR 32 (A) >60 mL/min/1.73 m^2   CBC Auto Differential   Result Value Ref Range    WBC 10.96 3.90 - 12.70 K/uL    RBC 4.72 4.00 - 5.40 M/uL    Hemoglobin 11.8 (L) 12.0 - 16.0 g/dL    Hematocrit 36.8 (L) 37.0 - 48.5 %    MCV 78 (L) 82 - 98 fL    MCH 25.0 (L) 27.0 - 31.0 pg    MCHC 32.1 32.0 - 36.0 g/dL    RDW 16.6 (H) 11.5 - 14.5 %    Platelets 241 150 - 450 K/uL    MPV 9.8 9.2 - 12.9 fL    Immature Granulocytes 0.5 0.0 - 0.5 %    Gran # (ANC) 7.6 1.8 - 7.7 K/uL    Immature Grans (Abs) 0.05 (H) 0.00 - 0.04 K/uL    Lymph # 2.2 1.0 - 4.8 K/uL    Mono # 1.0 0.3 - 1.0 K/uL    Eos # 0.1 0.0 - 0.5 K/uL    Baso # 0.06 0.00 - 0.20 K/uL    nRBC 0 0 /100 WBC    Gran % 69.1 38.0 - 73.0 %    Lymph % 20.0 18.0 - 48.0 %    Mono % 9.4 4.0 - 15.0 %    Eosinophil % 0.5 0.0 - 8.0 %    Basophil % 0.5 0.0 - 1.9 %    Differential Method Automated    Magnesium   Result Value Ref Range    Magnesium 1.8 1.6 - 2.6 mg/dL   Hepatic Function Panel   Result Value Ref Range    Total Protein 6.8 6.0 - 8.4 g/dL    Albumin 3.1 (L) 3.5 - 5.2 g/dL    Total Bilirubin 0.2 0.1 - 1.0 mg/dL    Bilirubin, Direct 0.1 0.1 - 0.3 mg/dL    AST 12 10 - 40 U/L    ALT 12 10 - 44 U/L    Alkaline Phosphatase 108 55 - 135 U/L   Comprehensive Metabolic Panel   Result Value Ref Range    Sodium 139 136 - 145 mmol/L    Potassium 4.0 3.5 - 5.1 mmol/L    Chloride 108 95 - 110 mmol/L    CO2 21 (L) 23 - 29 mmol/L    Glucose 111 (H) 70 - 110 mg/dL    BUN 28 (H) 8  - 23 mg/dL    Creatinine 1.4 0.5 - 1.4 mg/dL    Calcium 9.2 8.7 - 10.5 mg/dL    Total Protein 6.8 6.0 - 8.4 g/dL    Albumin 3.1 (L) 3.5 - 5.2 g/dL    Total Bilirubin 0.2 0.1 - 1.0 mg/dL    Alkaline Phosphatase 108 55 - 135 U/L    AST 12 10 - 40 U/L    ALT 12 10 - 44 U/L    Anion Gap 10 8 - 16 mmol/L    eGFR 40 (A) >60 mL/min/1.73 m^2   Protime-INR   Result Value Ref Range    Prothrombin Time 11.9 9.0 - 12.5 sec    INR 1.1 0.8 - 1.2   CBC Auto Differential   Result Value Ref Range    WBC 9.45 3.90 - 12.70 K/uL    RBC 4.94 4.00 - 5.40 M/uL    Hemoglobin 12.2 12.0 - 16.0 g/dL    Hematocrit 38.7 37.0 - 48.5 %    MCV 78 (L) 82 - 98 fL    MCH 24.7 (L) 27.0 - 31.0 pg    MCHC 31.5 (L) 32.0 - 36.0 g/dL    RDW 17.2 (H) 11.5 - 14.5 %    Platelets 246 150 - 450 K/uL    MPV 9.6 9.2 - 12.9 fL    Immature Granulocytes 0.6 (H) 0.0 - 0.5 %    Gran # (ANC) 5.8 1.8 - 7.7 K/uL    Immature Grans (Abs) 0.06 (H) 0.00 - 0.04 K/uL    Lymph # 2.6 1.0 - 4.8 K/uL    Mono # 0.9 0.3 - 1.0 K/uL    Eos # 0.1 0.0 - 0.5 K/uL    Baso # 0.06 0.00 - 0.20 K/uL    nRBC 0 0 /100 WBC    Gran % 61.0 38.0 - 73.0 %    Lymph % 27.3 18.0 - 48.0 %    Mono % 9.2 4.0 - 15.0 %    Eosinophil % 1.3 0.0 - 8.0 %    Basophil % 0.6 0.0 - 1.9 %    Differential Method Automated    Magnesium   Result Value Ref Range    Magnesium 1.9 1.6 - 2.6 mg/dL   Basic Metabolic Panel   Result Value Ref Range    Sodium 143 136 - 145 mmol/L    Potassium 4.8 3.5 - 5.1 mmol/L    Chloride 110 95 - 110 mmol/L    CO2 19 (L) 23 - 29 mmol/L    Glucose 110 70 - 110 mg/dL    BUN 13 8 - 23 mg/dL    Creatinine 0.9 0.5 - 1.4 mg/dL    Calcium 9.3 8.7 - 10.5 mg/dL    Anion Gap 14 8 - 16 mmol/L    eGFR >60 >60 mL/min/1.73 m^2   CBC Auto Differential   Result Value Ref Range    WBC 9.09 3.90 - 12.70 K/uL    RBC 5.12 4.00 - 5.40 M/uL    Hemoglobin 12.7 12.0 - 16.0 g/dL    Hematocrit 40.4 37.0 - 48.5 %    MCV 79 (L) 82 - 98 fL    MCH 24.8 (L) 27.0 - 31.0 pg    MCHC 31.4 (L) 32.0 - 36.0 g/dL    RDW 17.2  (H) 11.5 - 14.5 %    Platelets 273 150 - 450 K/uL    MPV 10.0 9.2 - 12.9 fL    Immature Granulocytes 0.3 0.0 - 0.5 %    Gran # (ANC) 5.6 1.8 - 7.7 K/uL    Immature Grans (Abs) 0.03 0.00 - 0.04 K/uL    Lymph # 2.5 1.0 - 4.8 K/uL    Mono # 0.8 0.3 - 1.0 K/uL    Eos # 0.1 0.0 - 0.5 K/uL    Baso # 0.06 0.00 - 0.20 K/uL    nRBC 0 0 /100 WBC    Gran % 61.2 38.0 - 73.0 %    Lymph % 27.6 18.0 - 48.0 %    Mono % 9.2 4.0 - 15.0 %    Eosinophil % 1.0 0.0 - 8.0 %    Basophil % 0.7 0.0 - 1.9 %    Differential Method Automated    Magnesium   Result Value Ref Range    Magnesium 1.6 1.6 - 2.6 mg/dL   Basic Metabolic Panel   Result Value Ref Range    Sodium 139 136 - 145 mmol/L    Potassium 3.8 3.5 - 5.1 mmol/L    Chloride 106 95 - 110 mmol/L    CO2 22 (L) 23 - 29 mmol/L    Glucose 145 (H) 70 - 110 mg/dL    BUN 9 8 - 23 mg/dL    Creatinine 0.9 0.5 - 1.4 mg/dL    Calcium 9.4 8.7 - 10.5 mg/dL    Anion Gap 11 8 - 16 mmol/L    eGFR >60 >60 mL/min/1.73 m^2   Echo   Result Value Ref Range    BSA 2.58 m2    TDI SEPTAL 0.07 m/s    LV LATERAL E/E' RATIO 5.89 m/s    LV SEPTAL E/E' RATIO 7.57 m/s    LA WIDTH 4.20 cm    IVC diameter 1.5 cm    Left Ventricular Outflow Tract Mean Velocity 1.02 cm/s    Left Ventricular Outflow Tract Mean Gradient 4.30 mmHg    TV mean gradient 19 mmHg    TDI LATERAL 0.09 m/s    LVIDd 4.41 3.5 - 6.0 cm    IVS 1.60 (A) 0.6 - 1.1 cm    Posterior Wall 1.49 (A) 0.6 - 1.1 cm    Ao root annulus 3.15 cm    LVIDs 2.57 2.1 - 4.0 cm    FS 42 28 - 44 %    LA volume 67.89 cm3    STJ 2.94 cm    Ascending aorta 2.67 cm    LV mass 280.24 g    LA size 3.64 cm    TAPSE 2.00 cm    Left Ventricle Relative Wall Thickness 0.68 cm    AV mean gradient 6 mmHg    AV valve area 2.86 cm2    AV Velocity Ratio 0.79     AV index (prosthetic) 0.91     MV valve area p 1/2 method 2.79 cm2    E/A ratio 0.82     Mean e' 0.08 m/s    E wave deceleration time 272.20 msec    IVRT 76.12 msec    LVOT diameter 2.00 cm    LVOT area 3.1 cm2    LVOT peak josé  1.19 m/s    LVOT peak VTI 28.80 cm    Ao peak henry 1.50 m/s    Ao VTI 31.6 cm    RVOT peak henry 1.04 m/s    RVOT peak VTI 25.1 cm    LVOT stroke volume 90.43 cm3    AV peak gradient 9 mmHg    PV mean gradient 2.75 mmHg    E/E' ratio 6.63 m/s    MV Peak E Henry 0.53 m/s    TR Max Henry 2.60 m/s    MV stenosis pressure 1/2 time 78.94 ms    MV Peak A Henry 0.65 m/s    LV Systolic Volume 23.99 mL    LV Systolic Volume Index 9.8 mL/m2    LV Diastolic Volume 87.94 mL    LV Diastolic Volume Index 35.75 mL/m2    LA Volume Index 27.6 mL/m2    LV Mass Index 114 g/m2    RA Major Axis 3.89 cm    Left Atrium Minor Axis 5.40 cm    Left Atrium Major Axis 5.06 cm    Triscuspid Valve Regurgitation Peak Gradient 27 mmHg    RA Width 3.00 cm    Right Atrial Pressure (from IVC) 3 mmHg    EF 65 %    TV rest pulmonary artery pressure 30 mmHg   POCT glucose   Result Value Ref Range    POCT Glucose 108 70 - 110 mg/dL   POCT glucose   Result Value Ref Range    POCT Glucose 102 70 - 110 mg/dL   POCT glucose   Result Value Ref Range    POCT Glucose 123 (H) 70 - 110 mg/dL   POCT glucose   Result Value Ref Range    POCT Glucose 124 (H) 70 - 110 mg/dL     *Note: Due to a large number of results and/or encounters for the requested time period, some results have not been displayed. A complete set of results can be found in Results Review.         Imaging Results:  Imaging Results              US Renal Artery Stenosis Hyperten (xpd) (Final result)  Result time 03/10/23 13:30:29      Final result by Skinny Warren MD (03/10/23 13:30:29)                   Impression:      Technically difficult examination no acute abnormality.  Bilateral chronic medical renal disease findings.  No evidence of renal artery stenosis.      Electronically signed by: Skinny Warren  Date:    03/10/2023  Time:    13:30               Narrative:    EXAMINATION:  Kidneys and renal arteries with Doppler    CLINICAL HISTORY:  Uncontrolled hypertension.    TECHNIQUE:  Grayscale,  color, and Doppler ultrasound evaluation of the bilateral kidneys including the renal arteries for renal artery stenosis.  Technically difficult examination secondary to body habitus and positioning.    COMPARISON:  None    FINDINGS:  The right kidney measures 8.8cm in size and no hydronephrosis.  Segmental resistive indices were noted of 0.66-0.67.  No evidence of parvus tardus is noted within the segmental arteries.  The main renal artery peak systolic velocity is 128 cm/sec.    The left kidney measures 8.9cm in size and demonstrates no hydronephrosis.  Segmental resistive indices were noted of 0.69-0.72.  No evidence of parvus tardus is noted in segmental renal arteries.  The main renal artery velocities range 170 cm/sec.    An aortic velocity is noted of 122 cm/sec.  This creates a right renal artery to aortic ratio of 1.1 and a left renal artery to aortic ratio of 1.4.    The renal veins appear patent bilaterally.                                       CT Head Without Contrast (Final result)  Result time 03/09/23 15:41:54      Final result by Leo Garcia MD (03/09/23 15:41:54)                   Impression:      No acute intracranial finding.    Alberta stroke programme early CT score (ASPECTS): 10      Electronically signed by: Leo Garcia  Date:    03/09/2023  Time:    15:41               Narrative:    EXAMINATION:  CT HEAD WITHOUT CONTRAST    CLINICAL HISTORY:  Syncope, recurrent;    TECHNIQUE:  Low dose axial CT images obtained throughout the head without intravenous contrast. Sagittal and coronal reconstructions were performed.  The CT exam was performed using one or more of the following dose reduction techniques- Automated exposure control, adjustment of the mA and/or kV according to patient size, and/or use of iterative reconstructed technique.    COMPARISON:  CT head June 13, 2022.  MRI brain Kecia 15, 2022.    FINDINGS:  Intracranial compartment:    Ventricles and sulci are normal in size for age  without evidence of hydrocephalus. No extra-axial blood or fluid collections.    The brain parenchyma appears normal. No parenchymal mass, hemorrhage, edema or major vascular distribution infarct.  Cerebellar tonsil ectopia.    Skull/extracranial contents (limited evaluation): No fracture. Mastoid air cells and paranasal sinuses are essentially clear.                                       X-Ray Chest AP Portable (Final result)  Result time 03/09/23 15:16:40      Final result by JEAN-PAUL Matta Sr., MD (03/09/23 15:16:40)                   Impression:      Normal study.      Electronically signed by: Skinny Matta MD  Date:    03/09/2023  Time:    15:16               Narrative:    EXAMINATION:  XR CHEST AP PORTABLE    CLINICAL HISTORY:  htn;    COMPARISON:  06/13/2022    FINDINGS:  The size of the heart is normal. The lungs are clear. There is no pneumothorax.  The costophrenic angles are sharp.                                       The EKG was ordered, reviewed, and independently interpreted by the ED provider.  Interpretation time: 15:11  Rate: 76 BPM  Rhythm: normal sinus rhythm  Interpretation: Left anterior fascicular block. Moderate voltage criteria for LVH, may be normal variant. Nonspecific T wave abnormality. No STEMI.             The Emergency Provider reviewed the vital signs and test results, which are outlined above.     ED Discussion     3:55 PM: Dr. Arce transfers care of patient to Dr. Raymundo.    4:03 PM: Discussed case with Magdiel Saez MD (Hospital Medicine). Dr. Saez agrees with current care and management of pt and accepts admission.   Admitting Service: Hospital Medicine  Admitting Physician: Dr. Saez  Admit to: obs med surg    4:03 PM: Re-evaluated pt. I have discussed test results, shared treatment plan, and the need for admission with patient and family at bedside. Pt and family express understanding at this time and agree with all information. All questions answered. Pt and  family have no further questions or concerns at this time. Pt is ready for admit.         Medical Decision Making:   Initial Assessment:   HTN, DIzziness, near syncope  Differential Diagnosis:   Near syncope, htn  Clinical Tests:   Lab Tests: Ordered and Reviewed  Radiological Study: Ordered and Reviewed  Medical Tests: Ordered and Reviewed  ED Management:  Labs and imaging reviewed by me.  No acute findings except for TANNER with a creatinine of 2.1.  Considered admission, and patient agrees.    Other:   I have discussed this case with another health care provider.       <> Summary of the Discussion: Discussed the case with Dr. Shoemaker () will place in observation.         ED Medication(s):  Medications   sodium chloride 0.9% flush 10 mL (has no administration in time range)   naloxone 0.4 mg/mL injection 0.4 mg (has no administration in time range)   glucagon (human recombinant) injection 1 mg (has no administration in time range)   dextrose 10% bolus 125 mL 125 mL (has no administration in time range)   dextrose 10% bolus 250 mL 250 mL (has no administration in time range)   dextrose 40 % gel 15,000 mg (has no administration in time range)   dextrose 40 % gel 30,000 mg (has no administration in time range)   ondansetron injection 4 mg (4 mg Intravenous Given 3/13/23 0352)   aluminum-magnesium hydroxide-simethicone 200-200-20 mg/5 mL suspension 30 mL (has no administration in time range)   simethicone chewable tablet 80 mg (has no administration in time range)   ALPRAZolam tablet 0.5 mg (0.5 mg Oral Given 3/12/23 2110)   apixaban tablet 5 mg (5 mg Oral Given 3/12/23 2106)   busPIRone tablet 15 mg (has no administration in time range)   butalbital-acetaminophen-caffeine -40 mg per tablet 1 tablet (1 tablet Oral Given 3/13/23 0352)   cloNIDine tablet 0.1 mg (0.1 mg Oral Given 3/12/23 1631)   letrozole tablet 2.5 mg (2.5 mg Oral Given 3/12/23 0836)   oxybutynin tablet 5 mg (5 mg Oral Given 3/12/23 0835)    ranolazine 12 hr tablet 1,000 mg (1,000 mg Oral Given 3/12/23 2106)   oxyCODONE-acetaminophen 7.5-325 mg per tablet 1 tablet (1 tablet Oral Given 3/10/23 2242)   zolpidem tablet 5 mg (5 mg Oral Given 3/12/23 2234)   acetaminophen tablet 650 mg (650 mg Oral Given 3/11/23 1253)   hydrALAZINE injection 10 mg (10 mg Intravenous Given 3/13/23 0321)   atropine injection 0.5 mg (has no administration in time range)   mupirocin 2 % ointment ( Nasal Given 3/12/23 2106)   chlorhexidine 0.12 % solution 15 mL (15 mLs Mouth/Throat Given 3/12/23 2106)   famotidine tablet 20 mg (20 mg Oral Given 3/12/23 2106)   amLODIPine tablet 5 mg (5 mg Oral Given 3/12/23 0836)   sacubitriL-valsartan 24-26 mg per tablet 1 tablet (1 tablet Oral Given 3/12/23 2106)   furosemide tablet 20 mg (20 mg Oral Given 3/12/23 1744)   polyethylene glycol packet 17 g (17 g Oral Given 3/12/23 1437)   hydrALAZINE injection 10 mg (10 mg Intravenous Given 3/9/23 1807)   sodium chloride 0.9% bolus 1,000 mL 1,000 mL (0 mLs Intravenous Stopped 3/9/23 1715)   lactated ringers bolus 250 mL (0 mLs Intravenous Stopped 3/10/23 1525)   gadobutroL (GADAVIST) injection 10 mL (10 mLs Intravenous Given 3/11/23 1738)   morphine injection 2 mg (2 mg Intravenous Given 3/11/23 2037)   hydrALAZINE injection 10 mg (10 mg Intravenous Given 3/12/23 1743)       Current Discharge Medication List                  Scribe Attestation:   Scribe #1: I performed the above scribed service and the documentation accurately describes the services I performed. I attest to the accuracy of the note.     Attending:   Physician Attestation Statement for Scribe #1: I, Melecio Arce MD, personally performed the services described in this documentation, as scribed by Denise Mazariegos, in my presence, and it is both accurate and complete.           Clinical Impression       ICD-10-CM ICD-9-CM   1. Malignant neoplasm of upper-outer quadrant of right breast in female, estrogen receptor positive   C50.411 174.4    Z17.0 V86.0   2. HTN (hypertension)  I10 401.9   3. Near syncope  R55 780.2   4. Chest pain  R07.9 786.50   5. Mass of right breast, unspecified quadrant  N63.10 611.72   6. Symptomatic bradycardia  R00.1 427.89   7. Hypotension, unspecified hypotension type  I95.9 458.9       Disposition:   Disposition: Placed in Observation  Condition: Chelsy Arce MD  03/13/23 0603

## 2023-03-09 NOTE — ASSESSMENT & PLAN NOTE
Followed by oncology   Continue Femara  Imaging ordered for new lump felt by patient  Recommend outpatient evaluation with oncology

## 2023-03-09 NOTE — ASSESSMENT & PLAN NOTE
Hold Entresto and diuretics due to TANNER   Continue hydralazine, carvedilol  Prn clonidine   Monitor blood pressure

## 2023-03-09 NOTE — H&P
[facility]  [facility]  History & Physical    Patient Name: Susu Luther  MRN: [id number]  Patient Class: OP- Observation  Admission Date: 3/9/2023  Attending Physician: Magdiel Saez MD   Primary Care Provider: Peace Arias MD         Patient information was obtained from patient, past medical records and ER records.     Subjective:     Principal Problem:<principal problem not specified>    Chief Complaint:   Chief Complaint   Patient presents with    Dizziness     Dizziness for several days that is progressively getting worse. Pt. Had near syncope today while at the [facility].         HPI: The patient is 72 yo female with past medical history of right breast cancer, hypertension, anxiety, AAA s/p repair, CVA, diastolic heart failure, TANNER, sleep apnea, and obesity who presented to the ED due to elevated blood pressure and dizziness. She reports her systolic blood pressure has been in the 200s at home. She has a headache and is dizzy. She went to her PCP to be evaluated and had  near syncopal episode. Patient is anxious and tearful when discussing her blood pressure. She is concerned about a lump she found in upper portion of her right breast. She states she forgot to mention it to Dr. Arias. Discussed ordering outpatient US as breast US are not usually done while patients are in the hospital. She verbalized understanding. Hospital medicine consulted. Patient noted to have TANNER. Patient placed in observation.      Past Medical History:   Diagnosis Date    Chronic diastolic congestive heart failure 8/25/2020    Encounter for blood transfusion     History of repair of aneurysm of abdominal aorta using endovascular stent graft     [employer]     of psychiatric care     Hypertension     Major depressive disorder, single episode, moderate with anxious distress 1/14/2020    Malignant neoplasm of upper-outer quadrant of right breast in female, estrogen receptor positive 3/14/2019     radiation    Psychiatric problem     Sleep apnea     Sleep difficulties     Stroke 2009    no residual defect    Therapy        Past Surgical History:   Procedure Laterality Date    APPENDECTOMY      AXILLARY NODE DISSECTION Right 2020    Procedure: LYMPHADENECTOMY, AXILLARY;  Surgeon: Artemio Starks MD;  Location: [facility] OR;  Service: General;  Laterality: Right;    BIOPSY OF AXILLARY NODE Right 2021    Procedure: BIOPSY, LYMPH NODE, AXILLARY;  Surgeon: Artemio Sutton MD;  Location: [address];  Service: General;  Laterality: Right;    BREAST BIOPSY          BREAST LUMPECTOMY      2019     SECTION      x 1    OOPHORECTOMY      right     SENTINEL LYMPH NODE BIOPSY Right 2019    Procedure: BIOPSY, LYMPH NODE, SENTINEL;  Surgeon: Artemio Starks MD;  Location: [facility] OR;  Service: General;  Laterality: Right;    splenic artery aneurysm repair      TONSILLECTOMY         Review of patient's allergies indicates:   Allergen Reactions    Pcn [penicillins] Hives       No current facility-administered medications on file prior to encounter.     Current Outpatient Medications on File Prior to Encounter   Medication Sig    ALPRAZolam (XANAX) 0.5 MG tablet Take 1 tablet (0.5 mg total) by mouth 2 (two) times daily as needed for Anxiety.    apixaban (ELIQUIS) 5 mg Tab Take 1 tablet (5 mg total) by mouth 2 (two) times daily.    atorvastatin (LIPITOR) 20 MG tablet Take 1 tablet (20 mg total) by mouth once daily.    busPIRone (BUSPAR) 15 MG tablet Take 1 tablet (15 mg total) by mouth 3 (three) times daily as needed (anxiety).    butalbital-acetaminophen-caffeine -40 mg (FIORICET, ESGIC) -40 mg per tablet TAKE 1 TABLET DAILY AS NEEDED FOR PAIN OR HEADACHES.    carvediloL (COREG) 25 MG tablet Take 1 tablet (25 mg total) by mouth 2 (two) times daily with meals.    cholecalciferol, vitamin D3, 1,250 mcg (50,000 unit) capsule Take 1 capsule (50,000 Units total) by  mouth once a week.    cloNIDine (CATAPRES) 0.1 MG tablet Take 1 tablet (0.1 mg total) by mouth 2 (two) times daily as needed (BP>170/95).    diclofenac sodium (VOLTAREN) 1 % Gel Apply 2 grams topically once daily.    doxazosin (CARDURA) 4 MG tablet Take 1 tablet (4 mg total) by mouth every evening.    DULoxetine (CYMBALTA) 30 MG capsule Take 30 mg by mouth once daily.    furosemide (LASIX) 20 MG tablet Take 1 tablet by mouth BID    hydrALAZINE (APRESOLINE) 100 MG tablet Take 1 tablet (100 mg total) by mouth every 8 (eight) hours.    hydroCHLOROthiazide (HYDRODIURIL) 50 MG tablet TAKE 1 TABLET ONCE DAILY.    hydrOXYzine HCL (ATARAX) 25 MG tablet TAKE 1 TABLET EVERY EVENING    letrozole (FEMARA) 2.5 mg Tab Take 1 tablet (2.5 mg total) by mouth once daily.    LIDOcaine (LIDODERM) 5 % Place 2 patches onto the skin once daily. Remove & Discard patch within 12 hours or as directed by MD    multivitamin (THERAGRAN) per tablet Take 1 tablet by mouth once daily.    oxybutynin (DITROPAN) 5 MG Tab Take 1 tablet (5 mg total) by mouth once daily.    oxyCODONE-acetaminophen (PERCOCET) 7.5-325 mg per tablet Take 1 tablet by mouth every 6 (six) hours as needed for Pain.    pregabalin (LYRICA) 75 MG capsule Take 1 capsule (75 mg total) by mouth 3 (three) times daily.    ranolazine (RANEXA) 1,000 mg Tb12 Take 1 tablet (1,000 mg total) by mouth 2 (two) times daily.    sacubitriL-valsartan (ENTRESTO) 24-26 mg per tablet Take 1 tablet by mouth 2 (two) times daily.    tiZANidine (ZANAFLEX) 4 MG tablet Take 1 tablet (4 mg total) by mouth every 8 (eight) hours.    zolpidem (AMBIEN) 5 MG Tab Take 1 tablet (5 mg total) by mouth nightly as needed (sleep).    [DISCONTINUED] albuterol (VENTOLIN HFA) 90 mcg/actuation inhaler Inhale 2 puffs into the lungs every 4 (four) hours as needed for Shortness of Breath. (Patient not taking: Reported on 2/20/2023)    [DISCONTINUED] predniSONE (DELTASONE) 20 MG tablet Take 4 tabs PO day 1  then 3 tabs PO day 2 then 2 tabs PO day3 then take 1 tab PO day 4     Family History       Problem Relation (Age of Onset)    Diabetes Maternal Grandmother, Maternal Grandfather, Mother    Hypertension Mother    Sickle cell anemia Daughter          Tobacco Use    Smoking status: Never    Smokeless tobacco: Never   Substance and Sexual Activity    Alcohol use: No    Drug use: No    Sexual activity: Yes     Partners: Male     Birth control/protection: Post-menopausal     Review of Systems   Cardiovascular:  Positive for chest pain.   Neurological:  Positive for dizziness and headaches.   All other systems reviewed and are negative.  Objective:     Vital Signs (Most Recent):  Temp: 97.9 °F (36.6 °C) (03/09/23 1418)  Pulse: 88 (03/09/23 1541)  Resp: 18 (03/09/23 1541)  BP: 133/83 (03/09/23 1541)  SpO2: 98 % (03/09/23 1541) Vital Signs (24h Range):  Temp:  [97.9 °F (36.6 °C)] 97.9 °F (36.6 °C)  Pulse:  [88] 88  Resp:  [18] 18  SpO2:  [98 %-99 %] 98 %  BP: (122-133)/(83-85) 133/83     Weight: (!) 142 kg (313 lb)  Body mass index is 41.3 kg/m².    Physical Exam  HENT:      Head: Normocephalic and atraumatic.   Cardiovascular:      Rate and Rhythm: Normal rate and regular rhythm.      Heart sounds: No murmur heard.  Pulmonary:      Effort: Pulmonary effort is normal. No respiratory distress.      Breath sounds: Normal breath sounds. No wheezing.   Chest:      Comments: Small lump right breast around 12 o'clock, skin appears normal   Abdominal:      General: Bowel sounds are normal. There is no distension.      Palpations: Abdomen is soft.      Tenderness: There is no abdominal tenderness.   Musculoskeletal:         General: No swelling.   Skin:     General: Skin is warm and dry.   Neurological:      Mental Status: She is alert and oriented to person, place, and time. Mental status is at baseline.           Significant Labs: All pertinent labs within the past 24 hours have been reviewed.  CBC:   Recent Labs   Lab  03/09/23  1508   WBC 11.54   HGB 14.0   HCT 43.3        CMP:   Recent Labs   Lab 03/09/23  1508      K 4.3      CO2 21*   *   BUN 29*   CREATININE 2.1*   CALCIUM 10.2   PROT 8.2   ALBUMIN 3.7   BILITOT 0.4   ALKPHOS 131   AST 13   ALT 12   ANIONGAP 13     Cardiac Markers:   Recent Labs   Lab 03/09/23  1508   BNP 29     Troponin:   Recent Labs   Lab 03/09/23  1508   TROPONINI 0.021       Significant Imaging: I have reviewed all pertinent imaging results/findings within the past 24 hours.    Assessment/Plan:     Chronic diastolic congestive heart failure  Patient is identified as having Diastolic (HFpEF) heart failure that is Chronic. CHF is currently controlled. Latest ECHO performed and demonstrates- Results for orders placed during the hospital encounter of 02/15/22    Echo    Interpretation Summary  · The left ventricle is normal in size with concentric hypertrophy and normal systolic function.  · The estimated ejection fraction is 60%.  · Normal left ventricular diastolic function.  · Normal right ventricular size with normal right ventricular systolic function.  · Mild to moderate tricuspid regurgitation.  · Normal central venous pressure (3 mmHg).  · The estimated PA systolic pressure is 36 mmHg.  . Continue Beta Blocker and monitor clinical status closely. Monitor on telemetry. Patient is off CHF pathway.  Monitor strict Is&Os and daily weights.  Place on fluid restriction of 1.5 L. Continue to stress to patient importance of self efficacy and  on diet for CHF. Last BNP reviewed- and noted below   Recent Labs   Lab 03/09/23  1508   BNP 29   .  Hold Entresto and diuretics  Monitor volume status    TANNER (acute kidney injury)  Patient with acute kidney injury likely due to IVVD/dehydration TANNER is currently worsening. Labs reviewed- Renal function/electrolytes with Estimated Creatinine Clearance: 39.6 mL/min (A) (based on SCr of 2.1 mg/dL (H)). according to latest data. Monitor  urine output and serial BMP and adjust therapy as needed. Avoid nephrotoxins and renally dose meds for GFR listed above.   Suspect IVVD as hemoglobin appears concentrated based upon chart reviwe, currently 14 but baseline around 11  Hold diuretics  IVFs  Repeat labs in am    Headache  Likely multifactorial  Prn Fioricet      Hypertension  Hold Entresto and diuretics due to TANNER   Continue hydralazine, carvedilol  Prn clonidine   Monitor blood pressure      Malignant neoplasm of upper-outer quadrant of right breast in female, estrogen receptor positive  Followed by oncology   Continue Femara  Imaging ordered for new lump felt by patient  Recommend outpatient evaluation with oncology      NILS (obstructive sleep apnea)  CPAP nightly        VTE Risk Mitigation (From admission, onward)         Ordered     apixaban tablet 5 mg  2 times daily         03/09/23 1624     Reason for No Pharmacological VTE Prophylaxis  Once        Question:  Reasons:  Answer:  Already adequately anticoagulated on oral Anticoagulants    03/09/23 1620     IP VTE HIGH RISK PATIENT  Once         03/09/23 1620     Place sequential compression device  Until discontinued         03/09/23 1620                   Magdiel Saez MD  Department of Hospital Medicine   [facility] - Emergency Dept.

## 2023-03-09 NOTE — ASSESSMENT & PLAN NOTE
Patient is identified as having Diastolic (HFpEF) heart failure that is Chronic. CHF is currently controlled. Latest ECHO performed and demonstrates- Results for orders placed during the hospital encounter of 02/15/22    Echo    Interpretation Summary  · The left ventricle is normal in size with concentric hypertrophy and normal systolic function.  · The estimated ejection fraction is 60%.  · Normal left ventricular diastolic function.  · Normal right ventricular size with normal right ventricular systolic function.  · Mild to moderate tricuspid regurgitation.  · Normal central venous pressure (3 mmHg).  · The estimated PA systolic pressure is 36 mmHg.  . Continue Beta Blocker and monitor clinical status closely. Monitor on telemetry. Patient is off CHF pathway.  Monitor strict Is&Os and daily weights.  Place on fluid restriction of 1.5 L. Continue to stress to patient importance of self efficacy and  on diet for CHF. Last BNP reviewed- and noted below   Recent Labs   Lab 03/09/23  1508   BNP 29   .  Hold Entresto and diuretics  Monitor volume status

## 2023-03-09 NOTE — ASSESSMENT & PLAN NOTE
Patient with acute kidney injury likely due to IVVD/dehydration TANNER is currently worsening. Labs reviewed- Renal function/electrolytes with Estimated Creatinine Clearance: 39.6 mL/min (A) (based on SCr of 2.1 mg/dL (H)). according to latest data. Monitor urine output and serial BMP and adjust therapy as needed. Avoid nephrotoxins and renally dose meds for GFR listed above.   Suspect IVVD as hemoglobin appears concentrated based upon chart reviwe, currently 14 but baseline around 11  Hold diuretics  IVFs  Repeat labs in am

## 2023-03-10 PROBLEM — I95.9 HYPOTENSION: Status: ACTIVE | Noted: 2023-03-10

## 2023-03-10 PROBLEM — R00.1 BRADYCARDIA: Status: ACTIVE | Noted: 2023-03-10

## 2023-03-10 PROBLEM — R29.90 NEUROLOGICAL COMPLAINT: Status: ACTIVE | Noted: 2023-03-10

## 2023-03-10 PROBLEM — R47.89 ALTERATION IN SPEECH: Status: ACTIVE | Noted: 2023-03-10

## 2023-03-10 LAB
ALBUMIN SERPL BCP-MCNC: 3.1 G/DL (ref 3.5–5.2)
ALP SERPL-CCNC: 106 U/L (ref 55–135)
ALT SERPL W/O P-5'-P-CCNC: 10 U/L (ref 10–44)
ANION GAP SERPL CALC-SCNC: 13 MMOL/L (ref 8–16)
ANION GAP SERPL CALC-SCNC: 13 MMOL/L (ref 8–16)
ANISOCYTOSIS BLD QL SMEAR: SLIGHT
AORTIC ROOT ANNULUS: 3.15 CM
ASCENDING AORTA: 2.67 CM
AST SERPL-CCNC: 10 U/L (ref 10–40)
AV INDEX (PROSTH): 0.91
AV MEAN GRADIENT: 6 MMHG
AV PEAK GRADIENT: 9 MMHG
AV VALVE AREA: 2.86 CM2
AV VELOCITY RATIO: 0.79
BASOPHILS # BLD AUTO: 0.1 K/UL (ref 0–0.2)
BASOPHILS NFR BLD: 1 % (ref 0–1.9)
BILIRUB SERPL-MCNC: 0.3 MG/DL (ref 0.1–1)
BSA FOR ECHO PROCEDURE: 2.58 M2
BUN SERPL-MCNC: 31 MG/DL (ref 8–23)
BUN SERPL-MCNC: 31 MG/DL (ref 8–23)
CALCIUM SERPL-MCNC: 9 MG/DL (ref 8.7–10.5)
CALCIUM SERPL-MCNC: 9.1 MG/DL (ref 8.7–10.5)
CHLORIDE SERPL-SCNC: 107 MMOL/L (ref 95–110)
CHLORIDE SERPL-SCNC: 109 MMOL/L (ref 95–110)
CO2 SERPL-SCNC: 16 MMOL/L (ref 23–29)
CO2 SERPL-SCNC: 18 MMOL/L (ref 23–29)
CREAT SERPL-MCNC: 1.6 MG/DL (ref 0.5–1.4)
CREAT SERPL-MCNC: 1.7 MG/DL (ref 0.5–1.4)
CV ECHO LV RWT: 0.68 CM
DACRYOCYTES BLD QL SMEAR: ABNORMAL
DIFFERENTIAL METHOD: ABNORMAL
DOP CALC AO PEAK VEL: 1.5 M/S
DOP CALC AO VTI: 31.6 CM
DOP CALC LVOT AREA: 3.1 CM2
DOP CALC LVOT DIAMETER: 2 CM
DOP CALC LVOT PEAK VEL: 1.19 M/S
DOP CALC LVOT STROKE VOLUME: 90.43 CM3
DOP CALC RVOT PEAK VEL: 1.04 M/S
DOP CALC RVOT VTI: 25.1 CM
DOP CALCLVOT PEAK VEL VTI: 28.8 CM
E WAVE DECELERATION TIME: 272.2 MSEC
E/A RATIO: 0.82
E/E' RATIO: 6.63 M/S
ECHO LV POSTERIOR WALL: 1.49 CM (ref 0.6–1.1)
EJECTION FRACTION: 65 %
EOSINOPHIL # BLD AUTO: 0.1 K/UL (ref 0–0.5)
EOSINOPHIL NFR BLD: 1 % (ref 0–8)
ERYTHROCYTE [DISTWIDTH] IN BLOOD BY AUTOMATED COUNT: 17.6 % (ref 11.5–14.5)
EST. GFR  (NO RACE VARIABLE): 32 ML/MIN/1.73 M^2
EST. GFR  (NO RACE VARIABLE): 34 ML/MIN/1.73 M^2
FRACTIONAL SHORTENING: 42 % (ref 28–44)
GLUCOSE SERPL-MCNC: 100 MG/DL (ref 70–110)
GLUCOSE SERPL-MCNC: 129 MG/DL (ref 70–110)
HCT VFR BLD AUTO: 40.7 % (ref 37–48.5)
HGB BLD-MCNC: 12.3 G/DL (ref 12–16)
IMM GRANULOCYTES # BLD AUTO: 0.03 K/UL (ref 0–0.04)
IMM GRANULOCYTES NFR BLD AUTO: 0.3 % (ref 0–0.5)
INTERVENTRICULAR SEPTUM: 1.6 CM (ref 0.6–1.1)
IVC DIAMETER: 1.5 CM
IVRT: 76.12 MSEC
LA MAJOR: 5.06 CM
LA MINOR: 5.4 CM
LA WIDTH: 4.2 CM
LACTATE SERPL-SCNC: 1.1 MMOL/L (ref 0.5–2.2)
LEFT ATRIUM SIZE: 3.64 CM
LEFT ATRIUM VOLUME INDEX: 27.6 ML/M2
LEFT ATRIUM VOLUME: 67.89 CM3
LEFT INTERNAL DIMENSION IN SYSTOLE: 2.57 CM (ref 2.1–4)
LEFT VENTRICLE DIASTOLIC VOLUME INDEX: 35.75 ML/M2
LEFT VENTRICLE DIASTOLIC VOLUME: 87.94 ML
LEFT VENTRICLE MASS INDEX: 114 G/M2
LEFT VENTRICLE SYSTOLIC VOLUME INDEX: 9.8 ML/M2
LEFT VENTRICLE SYSTOLIC VOLUME: 23.99 ML
LEFT VENTRICULAR INTERNAL DIMENSION IN DIASTOLE: 4.41 CM (ref 3.5–6)
LEFT VENTRICULAR MASS: 280.24 G
LV LATERAL E/E' RATIO: 5.89 M/S
LV SEPTAL E/E' RATIO: 7.57 M/S
LVOT MG: 4.3 MMHG
LVOT MV: 1.02 CM/S
LYMPHOCYTES # BLD AUTO: 3.5 K/UL (ref 1–4.8)
LYMPHOCYTES NFR BLD: 35.2 % (ref 18–48)
MAGNESIUM SERPL-MCNC: 1.9 MG/DL (ref 1.6–2.6)
MCH RBC QN AUTO: 24.7 PG (ref 27–31)
MCHC RBC AUTO-ENTMCNC: 30.2 G/DL (ref 32–36)
MCV RBC AUTO: 82 FL (ref 82–98)
MONOCYTES # BLD AUTO: 1 K/UL (ref 0.3–1)
MONOCYTES NFR BLD: 9.5 % (ref 4–15)
MV PEAK A VEL: 0.65 M/S
MV PEAK E VEL: 0.53 M/S
MV STENOSIS PRESSURE HALF TIME: 78.94 MS
MV VALVE AREA P 1/2 METHOD: 2.79 CM2
NEUTROPHILS # BLD AUTO: 5.3 K/UL (ref 1.8–7.7)
NEUTROPHILS NFR BLD: 53 % (ref 38–73)
NRBC BLD-RTO: 0 /100 WBC
PHOSPHATE SERPL-MCNC: 4.2 MG/DL (ref 2.7–4.5)
PISA TR MAX VEL: 2.6 M/S
PLATELET # BLD AUTO: 203 K/UL (ref 150–450)
PLATELET BLD QL SMEAR: ABNORMAL
PMV BLD AUTO: 10.1 FL (ref 9.2–12.9)
POTASSIUM SERPL-SCNC: 4.1 MMOL/L (ref 3.5–5.1)
POTASSIUM SERPL-SCNC: 4.1 MMOL/L (ref 3.5–5.1)
PROT SERPL-MCNC: 6.6 G/DL (ref 6–8.4)
PV MEAN GRADIENT: 2.75 MMHG
RA MAJOR: 3.89 CM
RA PRESSURE: 3 MMHG
RA WIDTH: 3 CM
RBC # BLD AUTO: 4.98 M/UL (ref 4–5.4)
SODIUM SERPL-SCNC: 138 MMOL/L (ref 136–145)
SODIUM SERPL-SCNC: 138 MMOL/L (ref 136–145)
STJ: 2.94 CM
TARGETS BLD QL SMEAR: ABNORMAL
TDI LATERAL: 0.09 M/S
TDI SEPTAL: 0.07 M/S
TDI: 0.08 M/S
TR MAX PG: 27 MMHG
TR MEAN GRADIENT: 19 MMHG
TRICUSPID ANNULAR PLANE SYSTOLIC EXCURSION: 2 CM
TROPONIN I SERPL DL<=0.01 NG/ML-MCNC: 0.01 NG/ML (ref 0–0.03)
TSH SERPL DL<=0.005 MIU/L-ACNC: 1.69 UIU/ML (ref 0.4–4)
TV REST PULMONARY ARTERY PRESSURE: 30 MMHG
WBC # BLD AUTO: 10.05 K/UL (ref 3.9–12.7)

## 2023-03-10 PROCEDURE — G0378 HOSPITAL OBSERVATION PER HR: HCPCS | Mod: HCNC

## 2023-03-10 PROCEDURE — 36415 COLL VENOUS BLD VENIPUNCTURE: CPT | Mod: HCNC | Performed by: INTERNAL MEDICINE

## 2023-03-10 PROCEDURE — 93010 EKG 12-LEAD: ICD-10-PCS | Mod: HCNC,,, | Performed by: INTERNAL MEDICINE

## 2023-03-10 PROCEDURE — G0425 PR INPT TELEHEALTH CONSULT 30M: ICD-10-PCS | Mod: 95,HCNC,, | Performed by: NURSE PRACTITIONER

## 2023-03-10 PROCEDURE — 93010 ELECTROCARDIOGRAM REPORT: CPT | Mod: HCNC,,, | Performed by: INTERNAL MEDICINE

## 2023-03-10 PROCEDURE — 83735 ASSAY OF MAGNESIUM: CPT | Mod: HCNC | Performed by: STUDENT IN AN ORGANIZED HEALTH CARE EDUCATION/TRAINING PROGRAM

## 2023-03-10 PROCEDURE — 99900035 HC TECH TIME PER 15 MIN (STAT): Mod: HCNC

## 2023-03-10 PROCEDURE — 63600175 PHARM REV CODE 636 W HCPCS: Mod: HCNC | Performed by: INTERNAL MEDICINE

## 2023-03-10 PROCEDURE — 83605 ASSAY OF LACTIC ACID: CPT | Mod: HCNC | Performed by: STUDENT IN AN ORGANIZED HEALTH CARE EDUCATION/TRAINING PROGRAM

## 2023-03-10 PROCEDURE — 92610 EVALUATE SWALLOWING FUNCTION: CPT | Mod: HCNC

## 2023-03-10 PROCEDURE — 36556 PR INSERT NON-TUNNEL CV CATH 5+ YRS OLD: ICD-10-PCS | Mod: HCNC,,, | Performed by: INTERNAL MEDICINE

## 2023-03-10 PROCEDURE — 99223 1ST HOSP IP/OBS HIGH 75: CPT | Mod: HCNC,25,, | Performed by: INTERNAL MEDICINE

## 2023-03-10 PROCEDURE — 84443 ASSAY THYROID STIM HORMONE: CPT | Mod: HCNC | Performed by: STUDENT IN AN ORGANIZED HEALTH CARE EDUCATION/TRAINING PROGRAM

## 2023-03-10 PROCEDURE — 99223 PR INITIAL HOSPITAL CARE,LEVL III: ICD-10-PCS | Mod: HCNC,25,, | Performed by: INTERNAL MEDICINE

## 2023-03-10 PROCEDURE — 80048 BASIC METABOLIC PNL TOTAL CA: CPT | Mod: HCNC,XB | Performed by: STUDENT IN AN ORGANIZED HEALTH CARE EDUCATION/TRAINING PROGRAM

## 2023-03-10 PROCEDURE — 51798 US URINE CAPACITY MEASURE: CPT | Mod: HCNC

## 2023-03-10 PROCEDURE — 25000003 PHARM REV CODE 250: Mod: HCNC | Performed by: NURSE PRACTITIONER

## 2023-03-10 PROCEDURE — 80053 COMPREHEN METABOLIC PANEL: CPT | Mod: HCNC | Performed by: INTERNAL MEDICINE

## 2023-03-10 PROCEDURE — 36415 COLL VENOUS BLD VENIPUNCTURE: CPT | Mod: HCNC | Performed by: STUDENT IN AN ORGANIZED HEALTH CARE EDUCATION/TRAINING PROGRAM

## 2023-03-10 PROCEDURE — 25000003 PHARM REV CODE 250: Mod: HCNC | Performed by: INTERNAL MEDICINE

## 2023-03-10 PROCEDURE — 84484 ASSAY OF TROPONIN QUANT: CPT | Mod: HCNC | Performed by: STUDENT IN AN ORGANIZED HEALTH CARE EDUCATION/TRAINING PROGRAM

## 2023-03-10 PROCEDURE — 92522 EVALUATE SPEECH PRODUCTION: CPT | Mod: HCNC

## 2023-03-10 PROCEDURE — 84100 ASSAY OF PHOSPHORUS: CPT | Mod: HCNC | Performed by: STUDENT IN AN ORGANIZED HEALTH CARE EDUCATION/TRAINING PROGRAM

## 2023-03-10 PROCEDURE — 85025 COMPLETE CBC W/AUTO DIFF WBC: CPT | Mod: HCNC | Performed by: INTERNAL MEDICINE

## 2023-03-10 PROCEDURE — 63600175 PHARM REV CODE 636 W HCPCS: Mod: HCNC | Performed by: STUDENT IN AN ORGANIZED HEALTH CARE EDUCATION/TRAINING PROGRAM

## 2023-03-10 PROCEDURE — 36556 INSERT NON-TUNNEL CV CATH: CPT | Mod: HCNC,,, | Performed by: INTERNAL MEDICINE

## 2023-03-10 PROCEDURE — G0425 INPT/ED TELECONSULT30: HCPCS | Mod: 95,HCNC,, | Performed by: NURSE PRACTITIONER

## 2023-03-10 PROCEDURE — 93005 ELECTROCARDIOGRAM TRACING: CPT | Mod: HCNC

## 2023-03-10 RX ORDER — ATROPINE SULFATE 0.1 MG/ML
0.5 INJECTION INTRAVENOUS EVERY 5 MIN PRN
Status: DISCONTINUED | OUTPATIENT
Start: 2023-03-10 | End: 2023-03-15 | Stop reason: HOSPADM

## 2023-03-10 RX ORDER — SODIUM CHLORIDE 9 MG/ML
INJECTION, SOLUTION INTRAVENOUS CONTINUOUS
Status: DISCONTINUED | OUTPATIENT
Start: 2023-03-10 | End: 2023-03-11

## 2023-03-10 RX ORDER — FAMOTIDINE 20 MG/1
20 TABLET, FILM COATED ORAL DAILY
Status: DISCONTINUED | OUTPATIENT
Start: 2023-03-11 | End: 2023-03-11

## 2023-03-10 RX ORDER — NOREPINEPHRINE BITARTRATE/D5W 4MG/250ML
0-1 PLASTIC BAG, INJECTION (ML) INTRAVENOUS CONTINUOUS
Status: DISCONTINUED | OUTPATIENT
Start: 2023-03-10 | End: 2023-03-11

## 2023-03-10 RX ORDER — DOPAMINE HCL IN DEXTROSE 5 % 400MG/.25L
2.5 INFUSION BOTTLE (ML) INTRAVENOUS CONTINUOUS
Status: DISCONTINUED | OUTPATIENT
Start: 2023-03-10 | End: 2023-03-11

## 2023-03-10 RX ADMIN — OXYCODONE AND ACETAMINOPHEN 1 TABLET: 7.5; 325 TABLET ORAL at 10:03

## 2023-03-10 RX ADMIN — TIZANIDINE 4 MG: 4 TABLET ORAL at 06:03

## 2023-03-10 RX ADMIN — ALPRAZOLAM 0.5 MG: 0.5 TABLET ORAL at 09:03

## 2023-03-10 RX ADMIN — APIXABAN 5 MG: 2.5 TABLET, FILM COATED ORAL at 08:03

## 2023-03-10 RX ADMIN — OXYBUTYNIN CHLORIDE 5 MG: 5 TABLET ORAL at 08:03

## 2023-03-10 RX ADMIN — DULOXETINE 30 MG: 30 CAPSULE, DELAYED RELEASE ORAL at 08:03

## 2023-03-10 RX ADMIN — ONDANSETRON 4 MG: 2 INJECTION INTRAMUSCULAR; INTRAVENOUS at 02:03

## 2023-03-10 RX ADMIN — RANOLAZINE 1000 MG: 500 TABLET, EXTENDED RELEASE ORAL at 08:03

## 2023-03-10 RX ADMIN — CARVEDILOL 25 MG: 12.5 TABLET, FILM COATED ORAL at 08:03

## 2023-03-10 RX ADMIN — ZOLPIDEM TARTRATE 5 MG: 5 TABLET ORAL at 09:03

## 2023-03-10 RX ADMIN — LETROZOLE 2.5 MG: 2.5 TABLET, FILM COATED ORAL at 08:03

## 2023-03-10 RX ADMIN — HYDRALAZINE HYDROCHLORIDE 100 MG: 50 TABLET, FILM COATED ORAL at 06:03

## 2023-03-10 RX ADMIN — ONDANSETRON 4 MG: 2 INJECTION INTRAMUSCULAR; INTRAVENOUS at 11:03

## 2023-03-10 RX ADMIN — DOPAMINE HYDROCHLORIDE 5 MCG/KG/MIN: 160 INJECTION, SOLUTION INTRAVENOUS at 03:03

## 2023-03-10 RX ADMIN — ONDANSETRON 4 MG: 2 INJECTION INTRAMUSCULAR; INTRAVENOUS at 04:03

## 2023-03-10 RX ADMIN — BUTALBITAL, ACETAMINOPHEN AND CAFFEINE 1 TABLET: 50; 325; 40 TABLET ORAL at 06:03

## 2023-03-10 RX ADMIN — SODIUM CHLORIDE, POTASSIUM CHLORIDE, SODIUM LACTATE AND CALCIUM CHLORIDE 250 ML: 600; 310; 30; 20 INJECTION, SOLUTION INTRAVENOUS at 02:03

## 2023-03-10 RX ADMIN — BUTALBITAL, ACETAMINOPHEN AND CAFFEINE 1 TABLET: 50; 325; 40 TABLET ORAL at 04:03

## 2023-03-10 RX ADMIN — SODIUM CHLORIDE: 9 INJECTION, SOLUTION INTRAVENOUS at 06:03

## 2023-03-10 RX ADMIN — PREGABALIN 75 MG: 75 CAPSULE ORAL at 08:03

## 2023-03-10 NOTE — HPI
72 y/o female with prior stroke, HTN, PE (on eliquis), breast CA, CKD, diastolic heart failure, sleep apnea, obesity, anxiety, depression who presented to the ER with complaints of dizziness that has been going on for several days but progressively worsening.  Reports elevated blood pressure at home in the 200s.  She reported associated HA. Patient noted to have TANNER in the ER and was admitted for further evaluation and treatment.  Patient developed slurring and stuttering speech last night that has progressively worsened since last night prompting consult to [facility].    Just prior to the consult patient had episode of bradycardia, dizziness, and vomiting.  She was talking with SLP when it came on suddenly.  She was able to state the dizziness she was experiencing was different than what she's had in the past.  She reported associated HA and blurry vision with the event which was continuing.  She described having difficulty speaking since last which is different than she's had in the past.  She also reports right arm numbness and tremors for several weeks.    Patient has a history of prior stroke but with no residual symptoms.

## 2023-03-10 NOTE — ASSESSMENT & PLAN NOTE
- affecting medical decision making and complicating the above   - weight loss and lifestyle modifications would be beneficial

## 2023-03-10 NOTE — PROGRESS NOTES
[facility] - Tele[facility] ([facility])  [facility] Medicine  Progress Note    Patient Name: Susu Luther  MRN: [id number]  Patient Class: OP- Observation   Admission Date: 3/9/2023  Length of Stay: 0 days  Attending Physician: Gita Mariano, *  Primary Care Provider: Peace Arias MD        Subjective:     Principal Problem:<principal problem not specified>        HPI:  The patient is 72 yo female with past medical history of right breast cancer, hypertension, anxiety, AAA s/p repair, CVA, diastolic heart failure, TANNER, sleep apnea, and obesity who presented to the ED due to elevated blood pressure and dizziness. She reports her systolic blood pressure has been in the 200s at home. She has a headache and is dizzy. She went to her PCP to be evaluated and had  near syncopal episode. Patient is anxious and tearful when discussing her blood pressure. She is concerned about a lump she found in upper portion of her right breast. She states she forgot to mention it to Dr. Arias. Discussed ordering outpatient US as breast US are not usually done while patients are in the hospital. She verbalized understanding. Hospital medicine consulted. Patient noted to have TANNER. Patient placed in observation.      Overview/Hospital Course:  3/10  worsening slurring and  stuttering while speaking that has been worsening since last night.--.  We will follow up on stat CT head, Neurology evaluation.    Blood pressure is stable on current regimen.    Pending mammogram, ultrasound renal artery   Labs reviewed   Follow up on SLP evaluation            Interval History:       worsening slurring and  stuttering while speaking that has been worsening since last night.--.  We will follow up on stat CT head, Neurology evaluation.    Blood pressure is stable on current regimen.    Pending mammogram, ultrasound renal artery   Labs reviewed   Follow up on SLP evaluation           Review of Systems    Cardiovascular:  denied chest pain  Neurological:   Positive for dizziness and headaches, sluttering of speech   All other systems reviewed and are negative.  Objective:     Vital Signs (Most Recent):  Temp: 97.6 °F (36.4 °C) (03/10/23 1136)  Pulse: 61 (03/10/23 1136)  Resp: 18 (03/10/23 1136)  BP: (!) 120/57 (03/10/23 1136)  SpO2: (!) 93 % (03/10/23 1136)   Vital Signs (24h Range):  Temp:  [97.6 °F (36.4 °C)-98.4 °F (36.9 °C)] 97.6 °F (36.4 °C)  Pulse:  [61-88] 61  Resp:  [16-20] 18  SpO2:  [93 %-100 %] 93 %  BP: (105-216)/() 120/57     Weight: (!) 136.5 kg (301 lb)  Body mass index is 44.45 kg/m².    Intake/Output Summary (Last 24 hours) at 3/10/2023 1136  Last data filed at 3/10/2023 0546  Gross per 24 hour   Intake 2031.43 ml   Output 400 ml   Net 1631.43 ml      Physical Exam    HENT:      Head: Normocephalic and atraumatic.   Cardiovascular:      Rate and Rhythm: Normal rate and regular rhythm.      Heart sounds: No murmur heard.  Pulmonary:      Effort: Pulmonary effort is normal. No respiratory distress.      Breath sounds: Normal breath sounds. No wheezing.   Chest:      Comments: Small lump right breast around 12 o'clock, skin appears normal   Abdominal:      General: Bowel sounds are normal. There is no distension.      Palpations: Abdomen is soft.      Tenderness: There is no abdominal tenderness.   Musculoskeletal:         General: No swelling.   Skin:     General: Skin is warm and dry.   Neurological:      Mental Status: She is alert and oriented to person, place, and time. Mental status is at baseline.          Significant Labs: All pertinent labs within the past 24 hours have been reviewed.  CBC:   Recent Labs   Lab 03/09/23  1508 03/10/23  0517   WBC 11.54 10.05   HGB 14.0 12.3   HCT 43.3 40.7    203     CMP:   Recent Labs   Lab 03/09/23  1508 03/10/23  0517    138   K 4.3 4.1    109   CO2 21* 16*   * 100   BUN 29* 31*   CREATININE 2.1* 1.6*   CALCIUM 10.2 9.0   PROT 8.2 6.6   ALBUMIN 3.7 3.1*   BILITOT 0.4 0.3    ALKPHOS 131 106   AST 13 10   ALT 12 10   ANIONGAP 13 13       Significant Imaging:     Imaging Results              US Renal Artery Stenosis Hyperten (xpd) (In process)                      CT Head Without Contrast (Final result)  Result time 03/09/23 15:41:54      Final result by Leo Garcia MD (03/09/23 15:41:54)                   Impression:      No acute intracranial finding.    [place] stroke programme early CT score (ASPECTS): 10      Electronically signed by: Leo Garcia  Date:    03/09/2023  Time:    15:41               Narrative:    EXAMINATION:  CT HEAD WITHOUT CONTRAST    CLINICAL HISTORY:  Syncope, recurrent;    TECHNIQUE:  Low dose axial CT images obtained throughout the head without intravenous contrast. Sagittal and coronal reconstructions were performed.  The CT exam was performed using one or more of the following dose reduction techniques- Automated exposure control, adjustment of the mA and/or kV according to patient size, and/or use of iterative reconstructed technique.    COMPARISON:  CT head June 13, 2022.  MRI brain Kecia 15, 2022.    FINDINGS:  Intracranial compartment:    Ventricles and sulci are normal in size for age without evidence of hydrocephalus. No extra-axial blood or fluid collections.    The brain parenchyma appears normal. No parenchymal mass, hemorrhage, edema or major vascular distribution infarct.  Cerebellar tonsil ectopia.    Skull/extracranial contents (limited evaluation): No fracture. Mastoid air cells and paranasal sinuses are essentially clear.                                       X-Ray Chest AP Portable (Final result)  Result time 03/09/23 15:16:40      Final result by JEAN-PAUL Matta Sr., MD (03/09/23 15:16:40)                   Impression:      Normal study.      Electronically signed by: Skinny Matta MD  Date:    03/09/2023  Time:    15:16               Narrative:    EXAMINATION:  XR CHEST AP PORTABLE    CLINICAL  HISTORY:  htn;    COMPARISON:  06/13/2022    FINDINGS:  The size of the heart is normal. The lungs are clear. There is no pneumothorax.  The costophrenic angles are sharp.                                         Assessment/Plan:      Chronic diastolic congestive heart failure  Patient is identified as having Diastolic (HFpEF) heart failure that is Chronic. CHF is currently controlled. Latest ECHO performed and demonstrates- Results for orders placed during the hospital encounter of 02/15/22    Echo    Interpretation Summary  · The left ventricle is normal in size with concentric hypertrophy and normal systolic function.  · The estimated ejection fraction is 60%.  · Normal left ventricular diastolic function.  · Normal right ventricular size with normal right ventricular systolic function.  · Mild to moderate tricuspid regurgitation.  · Normal central venous pressure (3 mmHg).  · The estimated PA systolic pressure is 36 mmHg.  . Continue Beta Blocker and monitor clinical status closely. Monitor on telemetry. Patient is off CHF pathway.  Monitor strict Is&Os and daily weights.  Place on fluid restriction of 1.5 L. Continue to stress to patient importance of self efficacy and  on diet for CHF. Last BNP reviewed- and noted below   Recent Labs   Lab 03/09/23  1508   BNP 29   .  Hold Entresto and diuretics  Monitor volume status    TANNER (acute kidney injury)  Patient with acute kidney injury likely due to IVVD/dehydration TANNER is currently worsening. Labs reviewed- Renal function/electrolytes with Estimated Creatinine Clearance: 39.6 mL/min (A) (based on SCr of 2.1 mg/dL (H)). according to latest data. Monitor urine output and serial BMP and adjust therapy as needed. Avoid nephrotoxins and renally dose meds for GFR listed above.   Suspect IVVD as hemoglobin appears concentrated based upon chart reviwe, currently 14 but baseline around 11  Hold diuretics  IVFs  Repeat labs in am    Headache  Likely multifactorial  Prn  Fioricet      Hypertension  Hold Entresto and diuretics due to TANNER   Continue hydralazine, carvedilol  Prn clonidine   Monitor blood pressure      Malignant neoplasm of upper-outer quadrant of right breast in female, estrogen receptor positive  Followed by oncology   Continue Femara  Imaging ordered for new lump felt by patient  Recommend outpatient evaluation with oncology      NILS (obstructive sleep apnea)  CPAP nightly        VTE Risk Mitigation (From admission, onward)         Ordered     apixaban tablet 5 mg  2 times daily         03/09/23 1624     Reason for No Pharmacological VTE Prophylaxis  Once        Question:  Reasons:  Answer:  Already adequately anticoagulated on oral Anticoagulants    03/09/23 1620     IP VTE HIGH RISK PATIENT  Once         03/09/23 1620     Place sequential compression device  Until discontinued         03/09/23 1620                Discharge Planning   MARTA:      Code Status: Full Code   Is the patient medically ready for discharge?:     Reason for patient still in hospital (select all that apply): neurology f/u; CT head; pending imaging;                       Gita Mariano MD  Department of Hospital Medicine   [facility] - Telemetry ([facility])

## 2023-03-10 NOTE — CONSULTS
"[facility] - Intensive Care (Hospital)  Critical Care Medicine  Consult Note    Patient Name: Susu Luther  MRN: [id number]  Admission Date: 3/9/2023  Hospital Length of Stay: 0 days  Code Status: Full Code  Attending Physician: Gita Mariano, *   Primary Care Provider: Peace Arias MD   Principal Problem: Hypertension    Subjective:     HPI:  73 year old female with a known past medical history of right breast cancer that is currently in remission, hypertension, anxiety, AAA s/p repair, CVA, diastolic heart failure, TANNER, sleep apnea, PE on Eliquis, and obesity who presented to the ED 3/9 due to elevated blood pressure and dizziness. She reports her systolic blood pressure has been in the 200s at home. She has a headache and is dizzy. She went to her PCP 3/9 to be evaluated and had a near syncopal episode. Patient is anxious and tearful when discussing her blood pressure. She is concerned about a lump she found in upper portion of her right breast. She states she forgot to mention it to Dr. Arias. Discussed ordering outpatient US as breast US are not usually done while patients are in the hospital. She verbalized understanding. Hospital medicine consulted. Patient noted to have TANNER. Patient placed in observation.    During the day of 3/10, pt was noted to have worsening slurring and stuttering while speaking that has been worsening since last night. CT head obtained that was without acute findings. Neurology was consulted for evaluation. Later in the day with with development of nausea, vomiting, diarrhea, and episodes of bradycardia. Pt also s/o blurry vision and the "room is spinning". Pt was on coreg for BP control that has since been held. Pt moved to the ICU on the afternoon of 3/10 for closer monitoring and plans for dopamine gtt for HR and BP support. Pt has also been seen per cards this afternoon in consult.      Hospital/ICU Course:  No notes on file    Past Medical History:   Diagnosis Date    " Chronic diastolic congestive heart failure 2020    Encounter for blood transfusion     History of repair of aneurysm of abdominal aorta using endovascular stent graft     DR Bonds     of psychiatric care     Hypertension     Major depressive disorder, single episode, moderate with anxious distress 2020    Malignant neoplasm of upper-outer quadrant of right breast in female, estrogen receptor positive 3/14/2019    radiation    Psychiatric problem     Sleep apnea     Sleep difficulties     Stroke 2009    no residual defect    Therapy        Past Surgical History:   Procedure Laterality Date    APPENDECTOMY      AXILLARY NODE DISSECTION Right 2020    Procedure: LYMPHADENECTOMY, AXILLARY;  Surgeon: Artemio Starks MD;  Location: [facility] OR;  Service: General;  Laterality: Right;    BIOPSY OF AXILLARY NODE Right 2021    Procedure: BIOPSY, LYMPH NODE, AXILLARY;  Surgeon: Artemio Sutton MD;  Location: [address];  Service: General;  Laterality: Right;    BREAST BIOPSY      2019    BREAST LUMPECTOMY      2019     SECTION      x 1    OOPHORECTOMY      right     SENTINEL LYMPH NODE BIOPSY Right 2019    Procedure: BIOPSY, LYMPH NODE, SENTINEL;  Surgeon: Artemio Starks MD;  Location: [facility];  Service: General;  Laterality: Right;    splenic artery aneurysm repair      TONSILLECTOMY         Review of patient's allergies indicates:   Allergen Reactions    Pcn [penicillins] Hives       Family History       Problem Relation (Age of Onset)    Diabetes Maternal Grandmother, Maternal Grandfather, Mother    Hypertension Mother    Sickle cell anemia Daughter          Tobacco Use    Smoking status: Never    Smokeless tobacco: Never   Substance and Sexual Activity    Alcohol use: No    Drug use: No    Sexual activity: Yes     Partners: Male     Birth control/protection: Post-menopausal         Review of Systems   Constitutional:  Positive for fatigue. Negative for fever.   Respiratory:   Negative for shortness of breath and wheezing.    Cardiovascular:  Negative for chest pain and palpitations.   Gastrointestinal:  Positive for diarrhea, nausea and vomiting. Negative for abdominal distention.   Musculoskeletal:  Positive for neck stiffness.   Neurological:  Positive for dizziness, light-headedness, numbness (to right side) and headaches.   All other systems reviewed and are negative.    Objective:     Vital Signs (Most Recent):  Temp: 97.8 °F (36.6 °C) (03/10/23 1357)  Pulse: (!) 55 (03/10/23 1450)  Resp: 20 (03/10/23 1450)  BP: (!) 140/87 (03/10/23 1450)  SpO2: 95 % (03/10/23 1450)   Vital Signs (24h Range):  Temp:  [97.6 °F (36.4 °C)-98.4 °F (36.9 °C)] 97.8 °F (36.6 °C)  Pulse:  [27-77] 55  Resp:  [14-20] 20  SpO2:  [93 %-100 %] 95 %  BP: (105-216)/() 140/87     Weight: (!) 136.5 kg (301 lb)  Body mass index is 44.45 kg/m².      Intake/Output Summary (Last 24 hours) at 3/10/2023 1545  Last data filed at 3/10/2023 1245  Gross per 24 hour   Intake 2211.43 ml   Output 700 ml   Net 1511.43 ml       Physical Exam  Vitals reviewed.   Constitutional:       General: She is awake. She is not in acute distress.     Appearance: She is morbidly obese. She is ill-appearing.   Cardiovascular:      Rate and Rhythm: Bradycardia present.      Pulses:           Radial pulses are 2+ on the right side and 2+ on the left side.   Pulmonary:      Effort: Pulmonary effort is normal.      Breath sounds: Normal breath sounds.   Abdominal:      General: There is no distension.      Palpations: Abdomen is soft.   Skin:     General: Skin is cool and dry.   Neurological:      General: No focal deficit present.      Mental Status: She is alert and oriented to person, place, and time.      GCS: GCS eye subscore is 4. GCS verbal subscore is 5. GCS motor subscore is 6.   Psychiatric:         Behavior: Behavior is cooperative.      Comments: Tearful, calm but anxious appearing        Vents:       Lines/Drains/Airways        Drain  Duration                  Closed/Suction Drain 03/02/21 1625 Right Breast Bulb 19 Fr. 737 days              Peripheral Intravenous Line  Duration                  Peripheral IV - Single Lumen 03/09/23 1600 20 G Anterior;Left Antecubital <1 day                    Significant Labs:    CBC/Anemia Profile:  Recent Labs   Lab 03/09/23  1508 03/10/23  0517   WBC 11.54 10.05   HGB 14.0 12.3   HCT 43.3 40.7    203   MCV 77* 82   RDW 17.0* 17.6*        Chemistries:  Recent Labs   Lab 03/09/23  1508 03/10/23  0517    138   K 4.3 4.1    109   CO2 21* 16*   BUN 29* 31*   CREATININE 2.1* 1.6*   CALCIUM 10.2 9.0   ALBUMIN 3.7 3.1*   PROT 8.2 6.6   BILITOT 0.4 0.3   ALKPHOS 131 106   ALT 12 10   AST 13 10       All pertinent labs within the past 24 hours have been reviewed.    Significant Imaging:   I have reviewed all pertinent imaging results/findings within the past 24 hours.      ABG  No results for input(s): PH, PO2, PCO2, HCO3, BE in the last 168 hours.  Assessment/Plan:     Neuro  Neurological complaint  - at time of admit pt with c/o HA and dizziness; on 3/10 pt reports of worsening slurring of words overnight, slight numbness to the R side of body and L side of face, development of N/V/D, periods or bradycardia, ongoing dizziness and HA  - at the time of my exam on the floor, NIH 1 at time of my exam for sensation   - not a candidate for tPA given patient is currently taking Eliquis, nor a candidate for thrombectomy given low NIH and she is not VAN positive  - CT head unremarkable  - tele neuro consulted, note and recs reviewed   - stat MRI brain and MRA brain/neck pending; however, patient on dopamine and there were concerns that patient will not be able to lie flat secondary to ongoing nausea vomiting - will obtain when safely able  - allow pt's SBP to rise up to 220 mmHg  - q1h neuro checks   - PT/OT when able to participate  - zofran PRN for nausea  - on Eliquis as noted elsewhere      Cardiac/Vascular  * Hypertension  - admitted 3/9 for SBP in the 200s  - hold hypertensive meds at this time and allow for SBP to run up to 220 mmHg until neuro imaging obtained to eval for ischemic stroke    Bradycardia  - new onset 3/10  - cardiology consulted   - transferred to the ICU for possible dopamine infusion  - hold further BB  - continue telemetry    Chronic diastolic congestive heart failure  - echo reviewed and within normal range    Renal/  TANNER (acute kidney injury)  - creatinine improving, monitor  - renally dose meds and avoid nephrotoxic agents as able  - follow lytes, RFP, and UOP    Hematology  History of pulmonary embolism  - continue home Eliquis    Oncology  Malignant neoplasm of upper-outer quadrant of right breast in female, estrogen receptor positive  - pt reports currently in remission but recently found a new lump and has not had it worked up yet as an outpt  - continue home dose letrozole  - supportive care    Endocrine  Morbid obesity with BMI of 45.0-49.9, adult  - affecting medical decision making and complicating the above   - weight loss and lifestyle modifications would be beneficial     Other  NILS (obstructive sleep apnea)  - NIPPV qHS and PRN naps  - affecting medical decision making and complicating the above   - weight loss and lifestyle modifications would be beneficial        Prophylaxis Measures:  GI ppx: Famotidine  VTE ppx: Enoxaparin  Glucose control: Monitor blood glucose    Code Status: Full Code      Critical Care Time: 45 minutes  Critical secondary to Patient is currently on drug therapy requiring intensive monitoring for toxicity: dopamine     Critical care was time spent personally by me on the following activities: development of treatment plan with patient or surrogate and bedside caregivers, discussions with consultants, evaluation of patient's response to treatment, examination of patient, ordering and performing treatments and interventions, ordering and  review of laboratory studies, ordering and review of radiographic studies, pulse oximetry, re-evaluation of patient's condition. This critical care time did not overlap with that of any other provider or involve time for any procedures.      Kei Radford NP  Critical Care Medicine  [facility] - Intensive Care ([facility])

## 2023-03-10 NOTE — HPI
"73 year old female with a known past medical history of right breast cancer that is currently in remission, hypertension, anxiety, AAA s/p repair, CVA, diastolic heart failure, TANNER, sleep apnea, PE on Eliquis, and obesity who presented to the ED 3/9 due to elevated blood pressure and dizziness. She reports her systolic blood pressure has been in the 200s at home. She has a headache and is dizzy. She went to her PCP 3/9 to be evaluated and had a near syncopal episode. Patient is anxious and tearful when discussing her blood pressure. She is concerned about a lump she found in upper portion of her right breast. She states she forgot to mention it to Dr. Arias. Discussed ordering outpatient US as breast US are not usually done while patients are in the hospital. She verbalized understanding. Hospital medicine consulted. Patient noted to have TANNER. Patient placed in observation.    During the day of 3/10, pt was noted to have worsening slurring and stuttering while speaking that has been worsening since last night. CT head obtained that was without acute findings. Neurology was consulted for evaluation. Later in the day with with development of nausea, vomiting, diarrhea, and episodes of bradycardia. Pt also s/o blurry vision and the "room is spinning". Pt was on coreg for BP control that has since been held. Pt moved to the ICU on the afternoon of 3/10 for closer monitoring and plans for dopamine gtt for HR and BP support. Pt has also been seen per cards this afternoon in consult.  "

## 2023-03-10 NOTE — ASSESSMENT & PLAN NOTE
- at time of admit pt with c/o HA and dizziness; on 3/10 pt reports of worsening slurring of words overnight, slight numbness to the R side of body and L side of face, development of N/V/D, periods or bradycardia, ongoing dizziness and HA  - at the time of my exam on the floor, NIH 1 at time of my exam for sensation   - CT head unremarkable  - tele neuro consulted, note and recs reviewed   - stat MRI brain and MRA brain/neck pending  - allow pt's SBP to rise up to 220 mmHg  - q1h neuro checks   - PT/OT when able to participate  - zofran PRN for nausea  - on Eliquis as noted elsewhere

## 2023-03-10 NOTE — PLAN OF CARE
Problem: Adult Inpatient Plan of Care  Goal: Plan of Care Review  Outcome: Ongoing, Progressing  Flowsheets (Taken 3/10/2023 0130)  Plan of Care Reviewed With: patient  Goal: Patient-Specific Goal (Individualized)  Outcome: Ongoing, Progressing  Flowsheets (Taken 3/10/2023 0130)  Anxieties, Fears or Concerns: Worrieed about nodule found for cancer  Individualized Care Needs: Improvement of synope episodes  Goal: Absence of Hospital-Acquired Illness or Injury  Outcome: Ongoing, Progressing  Intervention: Identify and Manage Fall Risk  Flowsheets (Taken 3/10/2023 0130)  Safety Promotion/Fall Prevention:   assistive device/personal item within reach   bed alarm set   Fall Risk reviewed with patient/family   Fall Risk signage in place   high risk medications identified   lighting adjusted   medications reviewed   nonskid shoes/socks when out of bed   side rails raised x 2   instructed to call staff for mobility  Intervention: Prevent Skin Injury  Flowsheets (Taken 3/10/2023 0130)  Body Position: sitting up in bed  Skin Protection:   adhesive use limited   incontinence pads utilized  Goal: Optimal Comfort and Wellbeing  Outcome: Ongoing, Progressing  Intervention: Monitor Pain and Promote Comfort  Flowsheets (Taken 3/10/2023 0130)  Pain Management Interventions:   pain management plan reviewed with patient/caregiver   pillow support provided   quiet environment facilitated   medication offered   relaxation techniques promoted  Intervention: Provide Person-Centered Care  Flowsheets (Taken 3/10/2023 0130)  Trust Relationship/Rapport:   care explained   questions encouraged   choices provided   reassurance provided   emotional support provided   empathic listening provided   questions answered   thoughts/feelings acknowledged  Goal: Readiness for Transition of Care  Outcome: Ongoing, Progressing  Intervention: Mutually Develop Transition Plan  Flowsheets (Taken 3/10/2023 0130)  Communicated MARTA with patient/caregiver:  Date not available/Unable to determine  Do you expect to return to your current living situation?: Yes  Do you have help at home or someone to help you manage your care at home?: Yes  Readmission within 30 days?: No  Do you currently have service(s) that help you manage your care at home?: Yes

## 2023-03-10 NOTE — SUBJECTIVE & OBJECTIVE
Past Medical History:   Diagnosis Date    Chronic diastolic congestive heart failure 2020    Encounter for blood transfusion     History of repair of aneurysm of abdominal aorta using endovascular stent graft     DR Bonds     of psychiatric care     Hypertension     Major depressive disorder, single episode, moderate with anxious distress 2020    Malignant neoplasm of upper-outer quadrant of right breast in female, estrogen receptor positive 3/14/2019    radiation    Psychiatric problem     Sleep apnea     Sleep difficulties     Stroke 2009    no residual defect    Therapy        Past Surgical History:   Procedure Laterality Date    APPENDECTOMY      AXILLARY NODE DISSECTION Right 2020    Procedure: LYMPHADENECTOMY, AXILLARY;  Surgeon: Artemio Starks MD;  Location: [facility];  Service: General;  Laterality: Right;    BIOPSY OF AXILLARY NODE Right 2021    Procedure: BIOPSY, LYMPH NODE, AXILLARY;  Surgeon: Artemio Sutton MD;  Location: [address];  Service: General;  Laterality: Right;    BREAST BIOPSY      2019    BREAST LUMPECTOMY      2019     SECTION      x 1    OOPHORECTOMY      right     SENTINEL LYMPH NODE BIOPSY Right 2019    Procedure: BIOPSY, LYMPH NODE, SENTINEL;  Surgeon: Artemio Starks MD;  Location: [facility];  Service: General;  Laterality: Right;    splenic artery aneurysm repair      TONSILLECTOMY         Review of patient's allergies indicates:   Allergen Reactions    Pcn [penicillins] Hives       Family History       Problem Relation (Age of Onset)    Diabetes Maternal Grandmother, Maternal Grandfather, Mother    Hypertension Mother    Sickle cell anemia Daughter          Tobacco Use    Smoking status: Never    Smokeless tobacco: Never   Substance and Sexual Activity    Alcohol use: No    Drug use: No    Sexual activity: Yes     Partners: Male     Birth control/protection: Post-menopausal         Review of Systems   Constitutional:  Positive for  fatigue. Negative for fever.   Respiratory:  Negative for shortness of breath and wheezing.    Cardiovascular:  Negative for chest pain and palpitations.   Gastrointestinal:  Positive for diarrhea, nausea and vomiting. Negative for abdominal distention.   Musculoskeletal:  Positive for neck stiffness.   Neurological:  Positive for dizziness, light-headedness, numbness (to right side) and headaches.   All other systems reviewed and are negative.    Objective:     Vital Signs (Most Recent):  Temp: 97.8 °F (36.6 °C) (03/10/23 1357)  Pulse: (!) 55 (03/10/23 1450)  Resp: 20 (03/10/23 1450)  BP: (!) 140/87 (03/10/23 1450)  SpO2: 95 % (03/10/23 1450)   Vital Signs (24h Range):  Temp:  [97.6 °F (36.4 °C)-98.4 °F (36.9 °C)] 97.8 °F (36.6 °C)  Pulse:  [27-77] 55  Resp:  [14-20] 20  SpO2:  [93 %-100 %] 95 %  BP: (105-216)/() 140/87     Weight: (!) 136.5 kg (301 lb)  Body mass index is 44.45 kg/m².      Intake/Output Summary (Last 24 hours) at 3/10/2023 1545  Last data filed at 3/10/2023 1245  Gross per 24 hour   Intake 2211.43 ml   Output 700 ml   Net 1511.43 ml       Physical Exam  Vitals reviewed.   Constitutional:       General: She is awake. She is not in acute distress.     Appearance: She is morbidly obese. She is ill-appearing.   Cardiovascular:      Rate and Rhythm: Bradycardia present.      Pulses:           Radial pulses are 2+ on the right side and 2+ on the left side.   Pulmonary:      Effort: Pulmonary effort is normal.      Breath sounds: Normal breath sounds.   Abdominal:      General: There is no distension.      Palpations: Abdomen is soft.   Skin:     General: Skin is cool and dry.   Neurological:      General: No focal deficit present.      Mental Status: She is alert and oriented to person, place, and time.      GCS: GCS eye subscore is 4. GCS verbal subscore is 5. GCS motor subscore is 6.   Psychiatric:         Behavior: Behavior is cooperative.      Comments: Tearful, calm but anxious appearing         Vents:       Lines/Drains/Airways       Drain  Duration                  Closed/Suction Drain 03/02/21 1625 Right Breast Bulb 19 Fr. 737 days              Peripheral Intravenous Line  Duration                  Peripheral IV - Single Lumen 03/09/23 1600 20 G Anterior;Left Antecubital <1 day                    Significant Labs:    CBC/Anemia Profile:  Recent Labs   Lab 03/09/23  1508 03/10/23  0517   WBC 11.54 10.05   HGB 14.0 12.3   HCT 43.3 40.7    203   MCV 77* 82   RDW 17.0* 17.6*        Chemistries:  Recent Labs   Lab 03/09/23  1508 03/10/23  0517    138   K 4.3 4.1    109   CO2 21* 16*   BUN 29* 31*   CREATININE 2.1* 1.6*   CALCIUM 10.2 9.0   ALBUMIN 3.7 3.1*   PROT 8.2 6.6   BILITOT 0.4 0.3   ALKPHOS 131 106   ALT 12 10   AST 13 10       All pertinent labs within the past 24 hours have been reviewed.    Significant Imaging:   I have reviewed all pertinent imaging results/findings within the past 24 hours.

## 2023-03-10 NOTE — SUBJECTIVE & OBJECTIVE
Past Medical History:   Diagnosis Date    Chronic diastolic congestive heart failure 2020    Encounter for blood transfusion     History of repair of aneurysm of abdominal aorta using endovascular stent graft     DR Bonds     of psychiatric care     Hypertension     Major depressive disorder, single episode, moderate with anxious distress 2020    Malignant neoplasm of upper-outer quadrant of right breast in female, estrogen receptor positive 3/14/2019    radiation    Psychiatric problem     Sleep apnea     Sleep difficulties     Stroke 2009    no residual defect    Therapy        Past Surgical History:   Procedure Laterality Date    APPENDECTOMY      AXILLARY NODE DISSECTION Right 2020    Procedure: LYMPHADENECTOMY, AXILLARY;  Surgeon: Artemio Starks MD;  Location: [facility];  Service: General;  Laterality: Right;    BIOPSY OF AXILLARY NODE Right 2021    Procedure: BIOPSY, LYMPH NODE, AXILLARY;  Surgeon: Artemio Sutton MD;  Location: [address];  Service: General;  Laterality: Right;    BREAST BIOPSY      2019    BREAST LUMPECTOMY      2019     SECTION      x 1    OOPHORECTOMY      right     SENTINEL LYMPH NODE BIOPSY Right 2019    Procedure: BIOPSY, LYMPH NODE, SENTINEL;  Surgeon: Artemio Starks MD;  Location: [facility];  Service: General;  Laterality: Right;    splenic artery aneurysm repair      TONSILLECTOMY         Review of patient's allergies indicates:   Allergen Reactions    Pcn [penicillins] Hives       No current facility-administered medications on file prior to encounter.     Current Outpatient Medications on File Prior to Encounter   Medication Sig    ALPRAZolam (XANAX) 0.5 MG tablet Take 1 tablet (0.5 mg total) by mouth 2 (two) times daily as needed for Anxiety.    apixaban (ELIQUIS) 5 mg Tab Take 1 tablet (5 mg total) by mouth 2 (two) times daily.    atorvastatin (LIPITOR) 20 MG tablet Take 1 tablet (20 mg total) by mouth once daily.    busPIRone  (BUSPAR) 15 MG tablet Take 1 tablet (15 mg total) by mouth 3 (three) times daily as needed (anxiety).    butalbital-acetaminophen-caffeine -40 mg (FIORICET, ESGIC) -40 mg per tablet TAKE 1 TABLET DAILY AS NEEDED FOR PAIN OR HEADACHES.    carvediloL (COREG) 25 MG tablet Take 1 tablet (25 mg total) by mouth 2 (two) times daily with meals.    cholecalciferol, vitamin D3, 1,250 mcg (50,000 unit) capsule Take 1 capsule (50,000 Units total) by mouth once a week.    cloNIDine (CATAPRES) 0.1 MG tablet Take 1 tablet (0.1 mg total) by mouth 2 (two) times daily as needed (BP>170/95).    diclofenac sodium (VOLTAREN) 1 % Gel Apply 2 grams topically once daily.    doxazosin (CARDURA) 4 MG tablet Take 1 tablet (4 mg total) by mouth every evening.    DULoxetine (CYMBALTA) 30 MG capsule Take 30 mg by mouth once daily.    furosemide (LASIX) 20 MG tablet Take 1 tablet by mouth BID    hydrALAZINE (APRESOLINE) 100 MG tablet Take 1 tablet (100 mg total) by mouth every 8 (eight) hours.    hydroCHLOROthiazide (HYDRODIURIL) 50 MG tablet TAKE 1 TABLET ONCE DAILY.    hydrOXYzine HCL (ATARAX) 25 MG tablet TAKE 1 TABLET EVERY EVENING    letrozole (FEMARA) 2.5 mg Tab Take 1 tablet (2.5 mg total) by mouth once daily.    LIDOcaine (LIDODERM) 5 % Place 2 patches onto the skin once daily. Remove & Discard patch within 12 hours or as directed by MD    multivitamin (THERAGRAN) per tablet Take 1 tablet by mouth once daily.    oxybutynin (DITROPAN) 5 MG Tab Take 1 tablet (5 mg total) by mouth once daily.    oxyCODONE-acetaminophen (PERCOCET) 7.5-325 mg per tablet Take 1 tablet by mouth every 6 (six) hours as needed for Pain.    pregabalin (LYRICA) 75 MG capsule Take 1 capsule (75 mg total) by mouth 3 (three) times daily.    ranolazine (RANEXA) 1,000 mg Tb12 Take 1 tablet (1,000 mg total) by mouth 2 (two) times daily.    sacubitriL-valsartan (ENTRESTO) 24-26 mg per tablet Take 1 tablet by mouth 2 (two) times daily.    tiZANidine (ZANAFLEX) 4  MG tablet Take 1 tablet (4 mg total) by mouth every 8 (eight) hours.    zolpidem (AMBIEN) 5 MG Tab Take 1 tablet (5 mg total) by mouth nightly as needed (sleep).     Family History       Problem Relation (Age of Onset)    Diabetes Maternal Grandmother, Maternal Grandfather, Mother    Hypertension Mother    Sickle cell anemia Daughter          Tobacco Use    Smoking status: Never    Smokeless tobacco: Never   Substance and Sexual Activity    Alcohol use: No    Drug use: No    Sexual activity: Yes     Partners: Male     Birth control/protection: Post-menopausal     Review of Systems   Constitutional: Positive for malaise/fatigue.   HENT: Negative.     Eyes: Negative.    Cardiovascular:  Positive for near-syncope.   Respiratory: Negative.     Skin: Negative.    Musculoskeletal: Negative.    Gastrointestinal: Negative.    Genitourinary: Negative.    Neurological:  Positive for dizziness, headaches and weakness.   Psychiatric/Behavioral: Negative.     Objective:     Vital Signs (Most Recent):  Temp: 97.8 °F (36.6 °C) (03/10/23 1357)  Pulse: (!) 55 (03/10/23 1450)  Resp: 20 (03/10/23 1450)  BP: (!) 140/87 (03/10/23 1450)  SpO2: 95 % (03/10/23 1450)   Vital Signs (24h Range):  Temp:  [97.6 °F (36.4 °C)-98.4 °F (36.9 °C)] 97.8 °F (36.6 °C)  Pulse:  [27-88] 55  Resp:  [14-20] 20  SpO2:  [93 %-100 %] 95 %  BP: (105-216)/() 140/87     Weight: (!) 136.5 kg (301 lb)  Body mass index is 44.45 kg/m².    SpO2: 95 %         Intake/Output Summary (Last 24 hours) at 3/10/2023 1537  Last data filed at 3/10/2023 1245  Gross per 24 hour   Intake 2211.43 ml   Output 700 ml   Net 1511.43 ml       Lines/Drains/Airways       Drain  Duration                  Closed/Suction Drain 03/02/21 1625 Right Breast Bulb 19 Fr. 737 days              Peripheral Intravenous Line  Duration                  Peripheral IV - Single Lumen 03/09/23 1600 20 G Anterior;Left Antecubital <1 day                    Physical Exam  Vitals and nursing note  reviewed.   Constitutional:       Appearance: She is obese.   HENT:      Head: Normocephalic.   Eyes:      Pupils: Pupils are equal, round, and reactive to light.   Cardiovascular:      Rate and Rhythm: Regular rhythm. Bradycardia present.      Heart sounds: Normal heart sounds, S1 normal and S2 normal. No murmur heard.    No S3 or S4 sounds.   Pulmonary:      Effort: Pulmonary effort is normal.      Breath sounds: Normal breath sounds.   Abdominal:      General: Bowel sounds are normal.      Palpations: Abdomen is soft.   Musculoskeletal:         General: Normal range of motion.      Cervical back: Normal range of motion.   Skin:     Capillary Refill: Capillary refill takes less than 2 seconds.   Neurological:      General: No focal deficit present.      Mental Status: She is alert and oriented to person, place, and time.   Psychiatric:         Mood and Affect: Mood normal.         Behavior: Behavior normal.         Thought Content: Thought content normal.       Significant Labs: BMP:   Recent Labs   Lab 03/09/23  1508 03/10/23  0517   * 100    138   K 4.3 4.1    109   CO2 21* 16*   BUN 29* 31*   CREATININE 2.1* 1.6*   CALCIUM 10.2 9.0   , CMP   Recent Labs   Lab 03/09/23  1508 03/10/23  0517    138   K 4.3 4.1    109   CO2 21* 16*   * 100   BUN 29* 31*   CREATININE 2.1* 1.6*   CALCIUM 10.2 9.0   PROT 8.2 6.6   ALBUMIN 3.7 3.1*   BILITOT 0.4 0.3   ALKPHOS 131 106   AST 13 10   ALT 12 10   ANIONGAP 13 13   , CBC   Recent Labs   Lab 03/09/23  1508 03/10/23  0517   WBC 11.54 10.05   HGB 14.0 12.3   HCT 43.3 40.7    203   , INR No results for input(s): INR, PROTIME in the last 48 hours., Lipid Panel No results for input(s): CHOL, HDL, LDLCALC, TRIG, CHOLHDL in the last 48 hours., Troponin   Recent Labs   Lab 03/09/23  1508   TROPONINI 0.021   , and All pertinent lab results from the last 24 hours have been reviewed.    Significant Imaging: Echocardiogram: Transthoracic echo  (TTE) complete (Cupid Only):   Results for orders placed or performed during the hospital encounter of 03/09/23   Echo   Result Value Ref Range    BSA 2.58 m2    TDI SEPTAL 0.07 m/s    LV LATERAL E/E' RATIO 5.89 m/s    LV SEPTAL E/E' RATIO 7.57 m/s    LA WIDTH 4.20 cm    IVC diameter 1.5 cm    Left Ventricular Outflow Tract Mean Velocity 1.02 cm/s    Left Ventricular Outflow Tract Mean Gradient 4.30 mmHg    TV mean gradient 19 mmHg    TDI LATERAL 0.09 m/s    LVIDd 4.41 3.5 - 6.0 cm    IVS 1.60 (A) 0.6 - 1.1 cm    Posterior Wall 1.49 (A) 0.6 - 1.1 cm    Ao root annulus 3.15 cm    LVIDs 2.57 2.1 - 4.0 cm    FS 42 28 - 44 %    LA volume 67.89 cm3    STJ 2.94 cm    Ascending aorta 2.67 cm    LV mass 280.24 g    LA size 3.64 cm    TAPSE 2.00 cm    Left Ventricle Relative Wall Thickness 0.68 cm    AV mean gradient 6 mmHg    AV valve area 2.86 cm2    AV Velocity Ratio 0.79     AV index (prosthetic) 0.91     MV valve area p 1/2 method 2.79 cm2    E/A ratio 0.82     Mean e' 0.08 m/s    E wave deceleration time 272.20 msec    IVRT 76.12 msec    LVOT diameter 2.00 cm    LVOT area 3.1 cm2    LVOT peak henry 1.19 m/s    LVOT peak VTI 28.80 cm    Ao peak henry 1.50 m/s    Ao VTI 31.6 cm    RVOT peak henry 1.04 m/s    RVOT peak VTI 25.1 cm    LVOT stroke volume 90.43 cm3    AV peak gradient 9 mmHg    PV mean gradient 2.75 mmHg    E/E' ratio 6.63 m/s    MV Peak E Henry 0.53 m/s    TR Max Henry 2.60 m/s    MV stenosis pressure 1/2 time 78.94 ms    MV Peak A Henry 0.65 m/s    LV Systolic Volume 23.99 mL    LV Systolic Volume Index 9.8 mL/m2    LV Diastolic Volume 87.94 mL    LV Diastolic Volume Index 35.75 mL/m2    LA Volume Index 27.6 mL/m2    LV Mass Index 114 g/m2    RA Major Axis 3.89 cm    Left Atrium Minor Axis 5.40 cm    Left Atrium Major Axis 5.06 cm    Triscuspid Valve Regurgitation Peak Gradient 27 mmHg    RA Width 3.00 cm    Right Atrial Pressure (from IVC) 3 mmHg    EF 65 %    TV rest pulmonary artery pressure 30 mmHg    Narrative     · The left ventricle is normal in size with concentric hypertrophy and   normal systolic function.  · The estimated ejection fraction is 65%.  · Normal left ventricular diastolic function.  · Normal right ventricular size with normal right ventricular systolic   function.  · Normal central venous pressure (3 mmHg).  · The estimated PA systolic pressure is 30 mmHg.      , EKG: reviewed, Stress Test: reviewed, and X-Ray: CXR: X-Ray Chest 1 View (CXR): No results found for this visit on 03/09/23.

## 2023-03-10 NOTE — ASSESSMENT & PLAN NOTE
- creatinine improving, monitor  - renally dose meds and avoid nephrotoxic agents as able  - follow lytes, RFP, and UOP

## 2023-03-10 NOTE — CONSULTS
[facility] - [facility] (Hospital)  Cardiology  Consult Note    Patient Name: Susu Luther  MRN: [id number]  Admission Date: 3/9/2023  Hospital Length of Stay: 0 days  Code Status: Full Code   Attending Provider: Gita Mariano, *   Consulting Provider: Alexus Rios NP  Primary Care Physician: Peace Arias MD  Principal Problem:<principal problem not specified>    Patient information was obtained from patient and ER records.     Inpatient consult to Cardiology  Consult performed by: Alexus Rios NP  Consult ordered by: Gita Mariano MD        Subjective:     Chief Complaint:  Dizziness, HA     HPI:   Ms. Luther is a 74y/o AA female with PMHx of diastolic HF, CVA, HLD, breast CA, hx of recurrent PE, AAA s/p repair, HTN, anxiety,  TANNER, NILS, obesity who presented to [facility] ED c/o dizziness and elevated home BP readings, she also felt like she was going to pass out. Cardiology consulted for symptomatic bradycardia. Pt seen and examined today. She went to her PCP prior and had a near syncopal episode. Today she had a possible bradycardia episode, tele monitor room reported 47 lowest rate. Labs reviewed, Crt 1.6, K 4.1, cardiac enzymes negative. CT head negative for any acute changes, Echo Jan 23' EF nml, Nuclear stress Jan 23' negative      Past Medical History:   Diagnosis Date    Chronic diastolic congestive heart failure 8/25/2020    Encounter for blood transfusion     History of repair of aneurysm of abdominal aorta using endovascular stent graft     DR Bonds    Hx of psychiatric care     Hypertension     Major depressive disorder, single episode, moderate with anxious distress 1/14/2020    Malignant neoplasm of upper-outer quadrant of right breast in female, estrogen receptor positive 3/14/2019    radiation    Psychiatric problem     Sleep apnea     Sleep difficulties     Stroke 2009    no residual defect    Therapy        Past Surgical History:   Procedure  Laterality Date    APPENDECTOMY      AXILLARY NODE DISSECTION Right 2020    Procedure: LYMPHADENECTOMY, AXILLARY;  Surgeon: Artemio Starks MD;  Location: [facility] OR;  Service: General;  Laterality: Right;    BIOPSY OF AXILLARY NODE Right 2021    Procedure: BIOPSY, LYMPH NODE, AXILLARY;  Surgeon: Artemio Sutton MD;  Location: [facility] OR [address] [place] [address];  Service: General;  Laterality: Right;    BREAST BIOPSY          BREAST LUMPECTOMY      2019     SECTION      x 1    OOPHORECTOMY      right     SENTINEL LYMPH NODE BIOPSY Right 2019    Procedure: BIOPSY, LYMPH NODE, SENTINEL;  Surgeon: Artemio Starks MD;  Location: [facility];  Service: General;  Laterality: Right;    splenic artery aneurysm repair      TONSILLECTOMY         Review of patient's allergies indicates:   Allergen Reactions    Pcn [penicillins] Hives       No current facility-administered medications on file prior to encounter.     Current Outpatient Medications on File Prior to Encounter   Medication Sig    ALPRAZolam (XANAX) 0.5 MG tablet Take 1 tablet (0.5 mg total) by mouth 2 (two) times daily as needed for Anxiety.    apixaban (ELIQUIS) 5 mg Tab Take 1 tablet (5 mg total) by mouth 2 (two) times daily.    atorvastatin (LIPITOR) 20 MG tablet Take 1 tablet (20 mg total) by mouth once daily.    busPIRone (BUSPAR) 15 MG tablet Take 1 tablet (15 mg total) by mouth 3 (three) times daily as needed (anxiety).    butalbital-acetaminophen-caffeine -40 mg (FIORICET, ESGIC) -40 mg per tablet TAKE 1 TABLET DAILY AS NEEDED FOR PAIN OR HEADACHES.    carvediloL (COREG) 25 MG tablet Take 1 tablet (25 mg total) by mouth 2 (two) times daily with meals.    cholecalciferol, vitamin D3, 1,250 mcg (50,000 unit) capsule Take 1 capsule (50,000 Units total) by mouth once a week.    cloNIDine (CATAPRES) 0.1 MG tablet Take 1 tablet (0.1 mg total) by mouth 2 (two) times daily as needed (BP>170/95).    diclofenac sodium  (VOLTAREN) 1 % Gel Apply 2 grams topically once daily.    doxazosin (CARDURA) 4 MG tablet Take 1 tablet (4 mg total) by mouth every evening.    DULoxetine (CYMBALTA) 30 MG capsule Take 30 mg by mouth once daily.    furosemide (LASIX) 20 MG tablet Take 1 tablet by mouth BID    hydrALAZINE (APRESOLINE) 100 MG tablet Take 1 tablet (100 mg total) by mouth every 8 (eight) hours.    hydroCHLOROthiazide (HYDRODIURIL) 50 MG tablet TAKE 1 TABLET ONCE DAILY.    hydrOXYzine HCL (ATARAX) 25 MG tablet TAKE 1 TABLET EVERY EVENING    letrozole (FEMARA) 2.5 mg Tab Take 1 tablet (2.5 mg total) by mouth once daily.    LIDOcaine (LIDODERM) 5 % Place 2 patches onto the skin once daily. Remove & Discard patch within 12 hours or as directed by MD    multivitamin (THERAGRAN) per tablet Take 1 tablet by mouth once daily.    oxybutynin (DITROPAN) 5 MG Tab Take 1 tablet (5 mg total) by mouth once daily.    oxyCODONE-acetaminophen (PERCOCET) 7.5-325 mg per tablet Take 1 tablet by mouth every 6 (six) hours as needed for Pain.    pregabalin (LYRICA) 75 MG capsule Take 1 capsule (75 mg total) by mouth 3 (three) times daily.    ranolazine (RANEXA) 1,000 mg Tb12 Take 1 tablet (1,000 mg total) by mouth 2 (two) times daily.    sacubitriL-valsartan (ENTRESTO) 24-26 mg per tablet Take 1 tablet by mouth 2 (two) times daily.    tiZANidine (ZANAFLEX) 4 MG tablet Take 1 tablet (4 mg total) by mouth every 8 (eight) hours.    zolpidem (AMBIEN) 5 MG Tab Take 1 tablet (5 mg total) by mouth nightly as needed (sleep).     Family History       Problem Relation (Age of Onset)    Diabetes Maternal Grandmother, Maternal Grandfather, Mother    Hypertension Mother    Sickle cell anemia Daughter          Tobacco Use    Smoking status: Never    Smokeless tobacco: Never   Substance and Sexual Activity    Alcohol use: No    Drug use: No    Sexual activity: Yes     Partners: Male     Birth control/protection: Post-menopausal     Review of Systems    Constitutional: Positive for malaise/fatigue.   HENT: Negative.     Eyes: Negative.    Cardiovascular:  Positive for near-syncope.   Respiratory: Negative.     Skin: Negative.    Musculoskeletal: Negative.    Gastrointestinal: Negative.    Genitourinary: Negative.    Neurological:  Positive for dizziness, headaches and weakness.   Psychiatric/Behavioral: Negative.     Objective:     Vital Signs (Most Recent):  Temp: 97.8 °F (36.6 °C) (03/10/23 1357)  Pulse: (!) 55 (03/10/23 1450)  Resp: 20 (03/10/23 1450)  BP: (!) 140/87 (03/10/23 1450)  SpO2: 95 % (03/10/23 1450)   Vital Signs (24h Range):  Temp:  [97.6 °F (36.4 °C)-98.4 °F (36.9 °C)] 97.8 °F (36.6 °C)  Pulse:  [27-88] 55  Resp:  [14-20] 20  SpO2:  [93 %-100 %] 95 %  BP: (105-216)/() 140/87     Weight: (!) 136.5 kg (301 lb)  Body mass index is 44.45 kg/m².    SpO2: 95 %         Intake/Output Summary (Last 24 hours) at 3/10/2023 1537  Last data filed at 3/10/2023 1245  Gross per 24 hour   Intake 2211.43 ml   Output 700 ml   Net 1511.43 ml       Lines/Drains/Airways       Drain  Duration                  Closed/Suction Drain 03/02/21 1625 Right Breast Bulb 19 Fr. 737 days              Peripheral Intravenous Line  Duration                  Peripheral IV - Single Lumen 03/09/23 1600 20 G Anterior;Left Antecubital <1 day                    Physical Exam  Vitals and nursing note reviewed.   Constitutional:       Appearance: She is obese.   HENT:      Head: Normocephalic.   Eyes:      Pupils: Pupils are equal, round, and reactive to light.   Cardiovascular:      Rate and Rhythm: Regular rhythm. Bradycardia present.      Heart sounds: Normal heart sounds, S1 normal and S2 normal. No murmur heard.    No S3 or S4 sounds.   Pulmonary:      Effort: Pulmonary effort is normal.      Breath sounds: Normal breath sounds.   Abdominal:      General: Bowel sounds are normal.      Palpations: Abdomen is soft.   Musculoskeletal:         General: Normal range of motion.       Cervical back: Normal range of motion.   Skin:     Capillary Refill: Capillary refill takes less than 2 seconds.   Neurological:      General: No focal deficit present.      Mental Status: She is alert and oriented to person, place, and time.   Psychiatric:         Mood and Affect: Mood normal.         Behavior: Behavior normal.         Thought Content: Thought content normal.       Significant Labs: BMP:   Recent Labs   Lab 03/09/23  1508 03/10/23  0517   * 100    138   K 4.3 4.1    109   CO2 21* 16*   BUN 29* 31*   CREATININE 2.1* 1.6*   CALCIUM 10.2 9.0   , CMP   Recent Labs   Lab 03/09/23  1508 03/10/23  0517    138   K 4.3 4.1    109   CO2 21* 16*   * 100   BUN 29* 31*   CREATININE 2.1* 1.6*   CALCIUM 10.2 9.0   PROT 8.2 6.6   ALBUMIN 3.7 3.1*   BILITOT 0.4 0.3   ALKPHOS 131 106   AST 13 10   ALT 12 10   ANIONGAP 13 13   , CBC   Recent Labs   Lab 03/09/23  1508 03/10/23  0517   WBC 11.54 10.05   HGB 14.0 12.3   HCT 43.3 40.7    203   , INR No results for input(s): INR, PROTIME in the last 48 hours., Lipid Panel No results for input(s): CHOL, HDL, LDLCALC, TRIG, CHOLHDL in the last 48 hours., Troponin   Recent Labs   Lab 03/09/23  1508   TROPONINI 0.021   , and All pertinent lab results from the last 24 hours have been reviewed.    Significant Imaging: Echocardiogram: Transthoracic echo (TTE) complete ([facility] Only):   Results for orders placed or performed during the hospital encounter of 03/09/23   Echo   Result Value Ref Range    BSA 2.58 m2    TDI SEPTAL 0.07 m/s    LV LATERAL E/E' RATIO 5.89 m/s    LV SEPTAL E/E' RATIO 7.57 m/s    LA WIDTH 4.20 cm    IVC diameter 1.5 cm    Left Ventricular Outflow Tract Mean Velocity 1.02 cm/s    Left Ventricular Outflow Tract Mean Gradient 4.30 mmHg    TV mean gradient 19 mmHg    TDI LATERAL 0.09 m/s    LVIDd 4.41 3.5 - 6.0 cm    IVS 1.60 (A) 0.6 - 1.1 cm    Posterior Wall 1.49 (A) 0.6 - 1.1 cm    Ao root annulus 3.15 cm    LVIDs  2.57 2.1 - 4.0 cm    FS 42 28 - 44 %    LA volume 67.89 cm3    STJ 2.94 cm    Ascending aorta 2.67 cm    LV mass 280.24 g    LA size 3.64 cm    TAPSE 2.00 cm    Left Ventricle Relative Wall Thickness 0.68 cm    AV mean gradient 6 mmHg    AV valve area 2.86 cm2    AV Velocity Ratio 0.79     AV index (prosthetic) 0.91     MV valve area p 1/2 method 2.79 cm2    E/A ratio 0.82     Mean e' 0.08 m/s    E wave deceleration time 272.20 msec    IVRT 76.12 msec    LVOT diameter 2.00 cm    LVOT area 3.1 cm2    LVOT peak henry 1.19 m/s    LVOT peak VTI 28.80 cm    Ao peak henry 1.50 m/s    Ao VTI 31.6 cm    RVOT peak henry 1.04 m/s    RVOT peak VTI 25.1 cm    LVOT stroke volume 90.43 cm3    AV peak gradient 9 mmHg    PV mean gradient 2.75 mmHg    E/E' ratio 6.63 m/s    MV Peak E Henry 0.53 m/s    TR Max Henry 2.60 m/s    MV stenosis pressure 1/2 time 78.94 ms    MV Peak A Henry 0.65 m/s    LV Systolic Volume 23.99 mL    LV Systolic Volume Index 9.8 mL/m2    LV Diastolic Volume 87.94 mL    LV Diastolic Volume Index 35.75 mL/m2    LA Volume Index 27.6 mL/m2    LV Mass Index 114 g/m2    RA Major Axis 3.89 cm    Left Atrium Minor Axis 5.40 cm    Left Atrium Major Axis 5.06 cm    Triscuspid Valve Regurgitation Peak Gradient 27 mmHg    RA Width 3.00 cm    Right Atrial Pressure (from IVC) 3 mmHg    EF 65 %    TV rest pulmonary artery pressure 30 mmHg    Narrative    · The left ventricle is normal in size with concentric hypertrophy and   normal systolic function.  · The estimated ejection fraction is 65%.  · Normal left ventricular diastolic function.  · Normal right ventricular size with normal right ventricular systolic   function.  · Normal central venous pressure (3 mmHg).  · The estimated PA systolic pressure is 30 mmHg.      , EKG: reviewed, Stress Test: reviewed, and X-Ray: CXR: X-Ray Chest 1 View (CXR): No results found for this visit on 03/09/23.    Assessment and Plan:     Bradycardia  Possible bradycardia episode, tele room noted 47  lowest HR  Hold A-V jena blocking agents  Transfer to ICU, ok to use dopamine  Assess response    Chronic diastolic congestive heart failure  Echo EF nml  BNP negative  Hold BB, for possible bradycardia episode  Cont medical management      History of pulmonary embolism  Eliquis    TANNER (acute kidney injury)  Management per     Headache  BP control, management per     Morbid obesity with BMI of 45.0-49.9, adult  Weight loss    Hypertension  Hold Coreg for symptomatic bradycardia  PRN Hydralazine, titrate meds as needed    NILS (obstructive sleep apnea)  CPAP        VTE Risk Mitigation (From admission, onward)         Ordered     apixaban tablet 5 mg  2 times daily         03/09/23 1624     Reason for No Pharmacological VTE Prophylaxis  Once        Question:  Reasons:  Answer:  Already adequately anticoagulated on oral Anticoagulants    03/09/23 1620     IP VTE HIGH RISK PATIENT  Once         03/09/23 1620     Place sequential compression device  Until discontinued         03/09/23 1620                Thank you for your consult. I will follow-up with patient. Please contact us if you have any additional questions.    Alexus Rios, NP  Cardiology   [facility] - Intensive Care ([facility])

## 2023-03-10 NOTE — HPI
Ms. Lutehr is a 72y/o AA female with PMHx of diastolic HF, CVA, HLD, breast CA, hx of recurrent PE, AAA s/p repair, HTN, anxiety,  TANNER, NILS, obesity who presented to [facility] ED c/o dizziness and elevated home BP readings, she also felt like she was going to pass out. Cardiology consulted for symptomatic bradycardia. Pt seen and examined today. She went to her PCP prior and had a near syncopal episode. Today she had a possible bradycardia episode, tele monitor room reported 47 lowest rate. Labs reviewed, Crt 1.6, K 4.1, cardiac enzymes negative. CT head negative for any acute changes, Echo Jan 23' EF nml, Nuclear stress Jan 23' negative

## 2023-03-10 NOTE — SUBJECTIVE & OBJECTIVE
Past Medical History:   Diagnosis Date    Chronic diastolic congestive heart failure 2020    Encounter for blood transfusion     History of repair of aneurysm of abdominal aorta using endovascular stent graft     DR Bonds    Hx of psychiatric care     Hypertension     Major depressive disorder, single episode, moderate with anxious distress 2020    Malignant neoplasm of upper-outer quadrant of right breast in female, estrogen receptor positive 3/14/2019    radiation    Psychiatric problem     Sleep apnea     Sleep difficulties     Stroke 2009    no residual defect    Therapy      Past Surgical History:   Procedure Laterality Date    APPENDECTOMY      AXILLARY NODE DISSECTION Right 2020    Procedure: LYMPHADENECTOMY, AXILLARY;  Surgeon: Artemio Starks MD;  Location: [facility];  Service: General;  Laterality: Right;    BIOPSY OF AXILLARY NODE Right 2021    Procedure: BIOPSY, LYMPH NODE, AXILLARY;  Surgeon: Artemio Sutton MD;  Location: [address];  Service: General;  Laterality: Right;    BREAST BIOPSY      2019    BREAST LUMPECTOMY      2019     SECTION      x 1    OOPHORECTOMY      right     SENTINEL LYMPH NODE BIOPSY Right 2019    Procedure: BIOPSY, LYMPH NODE, SENTINEL;  Surgeon: Artemio Starks MD;  Location: [facility];  Service: General;  Laterality: Right;    splenic artery aneurysm repair      TONSILLECTOMY       Family History   Problem Relation Age of Onset    Diabetes Maternal Grandmother     Diabetes Maternal Grandfather     Diabetes Mother     Hypertension Mother     Sickle cell anemia Daughter     Breast cancer Neg Hx     Colon cancer Neg Hx     Ovarian cancer Neg Hx      Social History     Tobacco Use    Smoking status: Never    Smokeless tobacco: Never   Substance Use Topics    Alcohol use: No    Drug use: No     Review of patient's allergies indicates:   Allergen Reactions    Pcn [penicillins] Hives       Medications: I have reviewed the current  medication administration record.    Medications Prior to Admission   Medication Sig Dispense Refill Last Dose    ALPRAZolam (XANAX) 0.5 MG tablet Take 1 tablet (0.5 mg total) by mouth 2 (two) times daily as needed for Anxiety. 180 tablet 0 Past Week    apixaban (ELIQUIS) 5 mg Tab Take 1 tablet (5 mg total) by mouth 2 (two) times daily. 180 tablet 3 3/9/2023    atorvastatin (LIPITOR) 20 MG tablet Take 1 tablet (20 mg total) by mouth once daily. 30 tablet 11 3/9/2023    busPIRone (BUSPAR) 15 MG tablet Take 1 tablet (15 mg total) by mouth 3 (three) times daily as needed (anxiety). 90 tablet 0 3/8/2023    butalbital-acetaminophen-caffeine -40 mg (FIORICET, ESGIC) -40 mg per tablet TAKE 1 TABLET DAILY AS NEEDED FOR PAIN OR HEADACHES. 30 tablet 1 Past Week    carvediloL (COREG) 25 MG tablet Take 1 tablet (25 mg total) by mouth 2 (two) times daily with meals. 30 tablet 6 3/9/2023    cholecalciferol, vitamin D3, 1,250 mcg (50,000 unit) capsule Take 1 capsule (50,000 Units total) by mouth once a week. 12 capsule 0 Past Week    cloNIDine (CATAPRES) 0.1 MG tablet Take 1 tablet (0.1 mg total) by mouth 2 (two) times daily as needed (BP>170/95). 60 tablet 1 3/9/2023    diclofenac sodium (VOLTAREN) 1 % Gel Apply 2 grams topically once daily. 400 g 1 Past Week    doxazosin (CARDURA) 4 MG tablet Take 1 tablet (4 mg total) by mouth every evening. 90 tablet 0 3/9/2023    DULoxetine (CYMBALTA) 30 MG capsule Take 30 mg by mouth once daily.   3/9/2023    furosemide (LASIX) 20 MG tablet Take 1 tablet by mouth BID 90 tablet 3 3/9/2023    hydrALAZINE (APRESOLINE) 100 MG tablet Take 1 tablet (100 mg total) by mouth every 8 (eight) hours. 1 tablet 0 3/9/2023    hydroCHLOROthiazide (HYDRODIURIL) 50 MG tablet TAKE 1 TABLET ONCE DAILY. 90 tablet 1 3/9/2023    hydrOXYzine HCL (ATARAX) 25 MG tablet TAKE 1 TABLET EVERY EVENING 90 tablet 0 Past Week    letrozole (FEMARA) 2.5 mg Tab Take 1 tablet (2.5 mg total) by mouth once daily. 90  tablet 1 3/9/2023    LIDOcaine (LIDODERM) 5 % Place 2 patches onto the skin once daily. Remove & Discard patch within 12 hours or as directed by MD Conner patch 1 Past Month    multivitamin (THERAGRAN) per tablet Take 1 tablet by mouth once daily.   3/9/2023    oxybutynin (DITROPAN) 5 MG Tab Take 1 tablet (5 mg total) by mouth once daily. 90 tablet 1 3/9/2023    oxyCODONE-acetaminophen (PERCOCET) 7.5-325 mg per tablet Take 1 tablet by mouth every 6 (six) hours as needed for Pain. 60 tablet 0 Past Month    pregabalin (LYRICA) 75 MG capsule Take 1 capsule (75 mg total) by mouth 3 (three) times daily. 90 capsule 2 3/9/2023    ranolazine (RANEXA) 1,000 mg Tb12 Take 1 tablet (1,000 mg total) by mouth 2 (two) times daily. 180 tablet 3 3/9/2023    sacubitriL-valsartan (ENTRESTO) 24-26 mg per tablet Take 1 tablet by mouth 2 (two) times daily. 90 tablet 3 3/9/2023    tiZANidine (ZANAFLEX) 4 MG tablet Take 1 tablet (4 mg total) by mouth every 8 (eight) hours. 90 tablet 1 3/9/2023    zolpidem (AMBIEN) 5 MG Tab Take 1 tablet (5 mg total) by mouth nightly as needed (sleep). 90 tablet 1 3/8/2023       Review of Systems   HENT:  Negative for trouble swallowing.    Eyes:  Positive for visual disturbance (Blurry vision).   Gastrointestinal:  Positive for diarrhea, nausea and vomiting.   Neurological:  Positive for dizziness, tremors, speech difficulty, numbness and headaches.   Psychiatric/Behavioral:  Negative for confusion.    Objective:     Vital Signs (Most Recent):  Temp: 97.8 °F (36.6 °C) (03/10/23 1357)  Pulse: 72 (03/10/23 1357)  Resp: 14 (03/10/23 1357)  BP: 113/77 (03/10/23 1357)  SpO2: 96 % (03/10/23 1357)    Vital Signs Range (Last 24H):  Temp:  [97.6 °F (36.4 °C)-98.4 °F (36.9 °C)]   Pulse:  [61-88]   Resp:  [14-20]   BP: (105-216)/()   SpO2:  [93 %-100 %]     Physical Exam  Constitutional:       Appearance: She is obese.   HENT:      Head: Normocephalic.      Right Ear: External ear normal.      Left Ear: External  ear normal.      Nose: Nose normal.   Pulmonary:      Effort: Pulmonary effort is normal. No respiratory distress.   Abdominal:      General: There is no distension.      Tenderness: There is no guarding.   Skin:     General: Skin is dry.   Neurological:      Mental Status: She is alert.       Neurological Exam:   LOC: alert  Attention Span: Good   Language: Stuttering speech  Articulation: Dysarthria  Orientation: Person, Place, Time   Facial Movement (CN VII): Symmetric facial expression      Neuro exam interrupted due to patient    Laboratory:  CMP:   Recent Labs   Lab 03/10/23  0517   CALCIUM 9.0   ALBUMIN 3.1*   PROT 6.6      K 4.1   CO2 16*      BUN 31*   CREATININE 1.6*   ALKPHOS 106   ALT 10   AST 10   BILITOT 0.3     CBC:   Recent Labs   Lab 03/10/23  0517   WBC 10.05   RBC 4.98   HGB 12.3   HCT 40.7      MCV 82   MCH 24.7*   MCHC 30.2*     Lipid Panel: No results for input(s): CHOL, LDLCALC, HDL, TRIG in the last 168 hours.  Coagulation: No results for input(s): PT, INR, APTT in the last 168 hours.  Hgb A1C: No results for input(s): HGBA1C in the last 168 hours.  TSH: No results for input(s): TSH in the last 168 hours.    Diagnostic Results:      Brain imaging:  3/9/2023 CT head  No early infarct changes.  No hemorrhage.  No mass effect.    3/10/2023 CT head  No acute changes when compared to 3/9 scan.    Vessel Imaging:  pending    Cardiac Evaluation:   3/9/2023 TTE  EF 65%; no wall motion abnormality; no thrombus/vegetation; normal left atrium.

## 2023-03-10 NOTE — ASSESSMENT & PLAN NOTE
Possible bradycardia episode, tele room noted 47 lowest HR  Hold A-V jena blocking agents  Transfer to ICU, ok to use dopamine  Assess response

## 2023-03-10 NOTE — ASSESSMENT & PLAN NOTE
- NIPPV qHS and PRN naps  - affecting medical decision making and complicating the above   - weight loss and lifestyle modifications would be beneficial

## 2023-03-10 NOTE — ASSESSMENT & PLAN NOTE
74 y/o female with prior stroke, HTN, PE (on eliquis), breast CA, CKD, diastolic heart failure, sleep apnea, obesity, anxiety, depression who initially presented with dizziness and elevated BP.  Developed alterations in speech overnight.  Shortly after starting the exam patient began to actively vomit and nurse stated her HR dropped into the 20's.  Consult was ended and nurse advised to immediately contact primary team.    Unable to fully examine patient at this time.  Outside of window for thrombolytic therapy.  Do not suspect LVO at this time. CTH with no acute change on initial and repeat CTH.  Suspect cardiac component for symptoms but given her risk factors for stroke would pursue MRI to fully evaluate.    Recommendations  -MRI brain, MRA brain and neck to further evaluate for acute stroke and symptomatic stenosis  -Please re-consult [facility] once patient is stable to complete full neuro evaluation

## 2023-03-10 NOTE — SUBJECTIVE & OBJECTIVE
Interval History:       worsening slurring and  stuttering while speaking that has been worsening since last night.--.  We will follow up on stat CT head, Neurology evaluation.    Blood pressure is stable on current regimen.    Pending mammogram, ultrasound renal artery   Labs reviewed   Follow up on SLP evaluation           Review of Systems    Cardiovascular:  denied chest pain  Neurological:  Positive for dizziness and headaches, sluttering of speech   All other systems reviewed and are negative.  Objective:     Vital Signs (Most Recent):  Temp: 97.6 °F (36.4 °C) (03/10/23 1136)  Pulse: 61 (03/10/23 1136)  Resp: 18 (03/10/23 1136)  BP: (!) 120/57 (03/10/23 1136)  SpO2: (!) 93 % (03/10/23 1136)   Vital Signs (24h Range):  Temp:  [97.6 °F (36.4 °C)-98.4 °F (36.9 °C)] 97.6 °F (36.4 °C)  Pulse:  [61-88] 61  Resp:  [16-20] 18  SpO2:  [93 %-100 %] 93 %  BP: (105-216)/() 120/57     Weight: (!) 136.5 kg (301 lb)  Body mass index is 44.45 kg/m².    Intake/Output Summary (Last 24 hours) at 3/10/2023 1136  Last data filed at 3/10/2023 0546  Gross per 24 hour   Intake 2031.43 ml   Output 400 ml   Net 1631.43 ml      Physical Exam    HENT:      Head: Normocephalic and atraumatic.   Cardiovascular:      Rate and Rhythm: Normal rate and regular rhythm.      Heart sounds: No murmur heard.  Pulmonary:      Effort: Pulmonary effort is normal. No respiratory distress.      Breath sounds: Normal breath sounds. No wheezing.   Chest:      Comments: Small lump right breast around 12 o'clock, skin appears normal   Abdominal:      General: Bowel sounds are normal. There is no distension.      Palpations: Abdomen is soft.      Tenderness: There is no abdominal tenderness.   Musculoskeletal:         General: No swelling.   Skin:     General: Skin is warm and dry.   Neurological:      Mental Status: She is alert and oriented to person, place, and time. Mental status is at baseline.          Significant Labs: All pertinent labs within  the past 24 hours have been reviewed.  CBC:   Recent Labs   Lab 03/09/23  1508 03/10/23  0517   WBC 11.54 10.05   HGB 14.0 12.3   HCT 43.3 40.7    203     CMP:   Recent Labs   Lab 03/09/23  1508 03/10/23  0517    138   K 4.3 4.1    109   CO2 21* 16*   * 100   BUN 29* 31*   CREATININE 2.1* 1.6*   CALCIUM 10.2 9.0   PROT 8.2 6.6   ALBUMIN 3.7 3.1*   BILITOT 0.4 0.3   ALKPHOS 131 106   AST 13 10   ALT 12 10   ANIONGAP 13 13       Significant Imaging:     Imaging Results              US Renal Artery Stenosis Hyperten (xpd) (In process)                      CT Head Without Contrast (Final result)  Result time 03/09/23 15:41:54      Final result by Leo Garcia MD (03/09/23 15:41:54)                   Impression:      No acute intracranial finding.    [place] stroke programme early CT score (ASPECTS): 10      Electronically signed by: Leo Garcia  Date:    03/09/2023  Time:    15:41               Narrative:    EXAMINATION:  CT HEAD WITHOUT CONTRAST    CLINICAL HISTORY:  Syncope, recurrent;    TECHNIQUE:  Low dose axial CT images obtained throughout the head without intravenous contrast. Sagittal and coronal reconstructions were performed.  The CT exam was performed using one or more of the following dose reduction techniques- Automated exposure control, adjustment of the mA and/or kV according to patient size, and/or use of iterative reconstructed technique.    COMPARISON:  CT head June 13, 2022.  MRI brain Kecia 15, 2022.    FINDINGS:  Intracranial compartment:    Ventricles and sulci are normal in size for age without evidence of hydrocephalus. No extra-axial blood or fluid collections.    The brain parenchyma appears normal. No parenchymal mass, hemorrhage, edema or major vascular distribution infarct.  Cerebellar tonsil ectopia.    Skull/extracranial contents (limited evaluation): No fracture. Mastoid air cells and paranasal sinuses are essentially clear.                                        X-Ray Chest AP Portable (Final result)  Result time 03/09/23 15:16:40      Final result by JEAN-PAUL Matta Sr., MD (03/09/23 15:16:40)                   Impression:      Normal study.      Electronically signed by: Skinny Matta MD  Date:    03/09/2023  Time:    15:16               Narrative:    EXAMINATION:  XR CHEST AP PORTABLE    CLINICAL HISTORY:  htn;    COMPARISON:  06/13/2022    FINDINGS:  The size of the heart is normal. The lungs are clear. There is no pneumothorax.  The costophrenic angles are sharp.

## 2023-03-10 NOTE — HOSPITAL COURSE
"73 year old female with a known past medical history of right breast cancer that is currently in remission, hypertension, anxiety, AAA s/p repair, CVA, diastolic heart failure, TANNER, sleep apnea, PE on Eliquis, and obesity who presented to the ED 3/9 due to elevated blood pressure, headache and dizziness. She reports her systolic blood pressure has been in the 200s at home.  She went to her PCP 3/9 to be evaluated and had a near syncopal episode.  Hospital medicine consulted and placed in observation.  On arrival CT head -ve for acute intracranial abn;      On 3/10, pt was noted to have worsening slurring and stuttering while speaking that has been worsening since overnight; stat repeat CT head obtained that was without acute findings. Vascular Neurology was unable to complete assessment due to patient's acute change in condition.. Later in the day patient developed nausea, vomiting, diarrhea, and episodes of bradycardia with heart rate in 30s. Pt also s/o blurry vision and the "room is spinning". Pt was on coreg for BP control that has since been held.  Cardiology was consulted.  Pt moved to the ICU on the afternoon of 3/10 for closer monitoring and has been started on dopamine gtt for HR and BP support. TSH, lactic acid, electrolytes within normal limits.    On 03/11, noted to have improvement in heart rate, blood pressure, dopamine decreased to 2.5 mics per kg per minute, eventually weaned off, heart rate, blood pressure is stayed stable.  Cardiology recommended to continue to hold Coreg. Patient was deemed stable for downgrade ICU team, hospital medicine consulted to assume care.    MRI brain, MRA head and neck did not show acute findings/stenosis/occlusion. Subsequently after imaging, amlodipine was added, creatinine improved, Entresto added.  Patient stated that she develops nausea with severe headache while on clonidine, hence not on it at home prior to admission.  Has been evaluated by Neurology on 03/12, " recommended to control ortho stats, correct electrolytes as needed, PT OT, delirium/fall precautions.TANNER resolved. She is concerned about a lump she found in upper portion of her right breast. She states she forgot to mention it to Dr. Arias- recommend OP US breast    03/13: Having issues with supine HTN, severe orthostatic hypotension. Cardiology reconsulted to eval 03/14.         Pt seen and examined on day of discharge. Cardiology evaluated and recommend thigh high stockings/SCDs to support venous return, continue current medication regimen. She reports she feels better, was able to ambulate to use bathroom without dizziness, BP has maintained 130-140 systolic. Pt advised to keep log of BP at home until she can follow up with PCP and cardiology for further management. Return precautions, follow up instructions and instructions re: slow positional changes discussed with patient and daughter at length, they expressed understanding. All questions answered to their satisfaction. Pt will followup with PCP and cardiology within 1 week. She was set up with HH and DME prior to discharge. [facility] referral placed.

## 2023-03-10 NOTE — ASSESSMENT & PLAN NOTE
-Stroke risk factor  -SBP < 220 until MRI/MRA completed and no evidence for acute infarct or symptomatic stenosis

## 2023-03-10 NOTE — CONSULTS
[facility] - Telemetry ([facility])  Vascular Neurology  Comprehensive Stroke Center  Consult Note    Inpatient Consult Tele-Vascular Neurology  Consult performed by: Emi Rome NP  Consult ordered by: Gita Mariano MD        Assessment/Plan:     Patient is a 73 y.o. year old female with:    Alteration in speech  74 y/o female with prior stroke, HTN, PE (on eliquis), breast CA, CKD, diastolic heart failure, sleep apnea, obesity, anxiety, depression who initially presented with dizziness and elevated BP.  Developed alterations in speech overnight.  Shortly after starting the exam patient began to actively vomit and nurse stated her HR dropped into the 20's.  Consult was ended and nurse advised to immediately contact primary team.    Unable to fully examine patient at this time.  Outside of window for thrombolytic therapy.  Do not suspect LVO at this time. CTH with no acute change on initial and repeat CTH.  Suspect cardiac component for symptoms but given her risk factors for stroke would pursue MRI to fully evaluate.    Recommendations  -MRI brain, MRA brain and neck to further evaluate for acute stroke and symptomatic stenosis  -Please re-consult TeleVascular Neurology once patient is stable to complete full neuro evaluation    History of pulmonary embolism  -Admitted 2/2022 with PE  -Discharged on Eliquis which she has continued to date    Morbid obesity with BMI of 45.0-49.9, adult  -Stroke risk factor  -Encourage improve diet and activity to improve BMI    Hypertension  -Stroke risk factor  -SBP < 220 until MRI/MRA completed and no evidence for acute infarct or symptomatic stenosis    NILS (obstructive sleep apnea)  -Stroke risk factor  -Encourage CPAP for secondary prevention        STROKE DOCUMENTATION          NIH Scale:       Modified [place]    [place] Coma Scale:    ABCD2 Score:    [id number] Score:   HAS -BLED Score:   ICH Score:   Hunt & Flores Classification:       Thrombolysis Candidate? No,  Out of window - Symptom onset > 4.5 hours    Delays to Thrombolysis?  Not Applicable    Interventional Revascularization Candidate?   Is the patient eligible for mechanical endovascular reperfusion (JOSE JUAN)?  No; at this time symptoms not suggestive of large vessel occlusion    Delays to Thrombectomy? Not Applicable    Hemorrhagic change of an Ischemic Stroke: Does this patient have an ischemic stroke with hemorrhagic changes? No     Subjective:     History of Present Illness:  74 y/o female with prior stroke, HTN, PE (on eliquis), breast CA, CKD, diastolic heart failure, sleep apnea, obesity, anxiety, depression who presented to the ER with complaints of dizziness that has been going on for several days but progressively worsening.  Reports elevated blood pressure at home in the 200s.  She reported associated HA. Patient noted to have TANNER in the ER and was admitted for further evaluation and treatment.  Patient developed slurring and stuttering speech last night that has progressively worsened since last night prompting consult to TeleVascular Neurology.    Just prior to the consult patient had episode of bradycardia, dizziness, and vomiting.  She was talking with SLP when it came on suddenly.  She was able to state the dizziness she was experiencing was different than what she's had in the past.  She reported associated HA and blurry vision with the event which was continuing.  She described having difficulty speaking since last which is different than she's had in the past.  She also reports right arm numbness and tremors for several weeks.    Patient has a history of prior stroke but with no residual symptoms.              Past Medical History:   Diagnosis Date    Chronic diastolic congestive heart failure 8/25/2020    Encounter for blood transfusion     History of repair of aneurysm of abdominal aorta using endovascular stent graft     DR Bonds    Hx of psychiatric care     Hypertension     Major depressive  disorder, single episode, moderate with anxious distress 2020    Malignant neoplasm of upper-outer quadrant of right breast in female, estrogen receptor positive 3/14/2019    radiation    Psychiatric problem     Sleep apnea     Sleep difficulties     Stroke 2009    no residual defect    Therapy      Past Surgical History:   Procedure Laterality Date    APPENDECTOMY      AXILLARY NODE DISSECTION Right 2020    Procedure: LYMPHADENECTOMY, AXILLARY;  Surgeon: Artemio Starks MD;  Location: [facility];  Service: General;  Laterality: Right;    BIOPSY OF AXILLARY NODE Right 2021    Procedure: BIOPSY, LYMPH NODE, AXILLARY;  Surgeon: Artemio Sutton MD;  Location: [address];  Service: General;  Laterality: Right;    BREAST BIOPSY      2019    BREAST LUMPECTOMY      2019     SECTION      x 1    OOPHORECTOMY      right     SENTINEL LYMPH NODE BIOPSY Right 2019    Procedure: BIOPSY, LYMPH NODE, SENTINEL;  Surgeon: Artemio Starks MD;  Location: [facility];  Service: General;  Laterality: Right;    splenic artery aneurysm repair      TONSILLECTOMY       Family History   Problem Relation Age of Onset    Diabetes Maternal Grandmother     Diabetes Maternal Grandfather     Diabetes Mother     Hypertension Mother     Sickle cell anemia Daughter     Breast cancer Neg Hx     Colon cancer Neg Hx     Ovarian cancer Neg Hx      Social History     Tobacco Use    Smoking status: Never    Smokeless tobacco: Never   Substance Use Topics    Alcohol use: No    Drug use: No     Review of patient's allergies indicates:   Allergen Reactions    Pcn [penicillins] Hives       Medications: I have reviewed the current medication administration record.    Medications Prior to Admission   Medication Sig Dispense Refill Last Dose    ALPRAZolam (XANAX) 0.5 MG tablet Take 1 tablet (0.5 mg total) by mouth 2 (two) times daily as needed for Anxiety. 180 tablet 0 Past Week    apixaban  (ELIQUIS) 5 mg Tab Take 1 tablet (5 mg total) by mouth 2 (two) times daily. 180 tablet 3 3/9/2023    atorvastatin (LIPITOR) 20 MG tablet Take 1 tablet (20 mg total) by mouth once daily. 30 tablet 11 3/9/2023    busPIRone (BUSPAR) 15 MG tablet Take 1 tablet (15 mg total) by mouth 3 (three) times daily as needed (anxiety). 90 tablet 0 3/8/2023    butalbital-acetaminophen-caffeine -40 mg (FIORICET, ESGIC) -40 mg per tablet TAKE 1 TABLET DAILY AS NEEDED FOR PAIN OR HEADACHES. 30 tablet 1 Past Week    carvediloL (COREG) 25 MG tablet Take 1 tablet (25 mg total) by mouth 2 (two) times daily with meals. 30 tablet 6 3/9/2023    cholecalciferol, vitamin D3, 1,250 mcg (50,000 unit) capsule Take 1 capsule (50,000 Units total) by mouth once a week. 12 capsule 0 Past Week    cloNIDine (CATAPRES) 0.1 MG tablet Take 1 tablet (0.1 mg total) by mouth 2 (two) times daily as needed (BP>170/95). 60 tablet 1 3/9/2023    diclofenac sodium (VOLTAREN) 1 % Gel Apply 2 grams topically once daily. 400 g 1 Past Week    doxazosin (CARDURA) 4 MG tablet Take 1 tablet (4 mg total) by mouth every evening. 90 tablet 0 3/9/2023    DULoxetine (CYMBALTA) 30 MG capsule Take 30 mg by mouth once daily.   3/9/2023    furosemide (LASIX) 20 MG tablet Take 1 tablet by mouth BID 90 tablet 3 3/9/2023    hydrALAZINE (APRESOLINE) 100 MG tablet Take 1 tablet (100 mg total) by mouth every 8 (eight) hours. 1 tablet 0 3/9/2023    hydroCHLOROthiazide (HYDRODIURIL) 50 MG tablet TAKE 1 TABLET ONCE DAILY. 90 tablet 1 3/9/2023    hydrOXYzine HCL (ATARAX) 25 MG tablet TAKE 1 TABLET EVERY EVENING 90 tablet 0 Past Week    letrozole (FEMARA) 2.5 mg Tab Take 1 tablet (2.5 mg total) by mouth once daily. 90 tablet 1 3/9/2023    LIDOcaine (LIDODERM) 5 % Place 2 patches onto the skin once daily. Remove & Discard patch within 12 hours or as directed by MD Conner patch 1 Past Month    multivitamin (THERAGRAN) per tablet Take 1 tablet by mouth once daily.    3/9/2023    oxybutynin (DITROPAN) 5 MG Tab Take 1 tablet (5 mg total) by mouth once daily. 90 tablet 1 3/9/2023    oxyCODONE-acetaminophen (PERCOCET) 7.5-325 mg per tablet Take 1 tablet by mouth every 6 (six) hours as needed for Pain. 60 tablet 0 Past Month    pregabalin (LYRICA) 75 MG capsule Take 1 capsule (75 mg total) by mouth 3 (three) times daily. 90 capsule 2 3/9/2023    ranolazine (RANEXA) 1,000 mg Tb12 Take 1 tablet (1,000 mg total) by mouth 2 (two) times daily. 180 tablet 3 3/9/2023    sacubitriL-valsartan (ENTRESTO) 24-26 mg per tablet Take 1 tablet by mouth 2 (two) times daily. 90 tablet 3 3/9/2023    tiZANidine (ZANAFLEX) 4 MG tablet Take 1 tablet (4 mg total) by mouth every 8 (eight) hours. 90 tablet 1 3/9/2023    zolpidem (AMBIEN) 5 MG Tab Take 1 tablet (5 mg total) by mouth nightly as needed (sleep). 90 tablet 1 3/8/2023       Review of Systems   HENT:  Negative for trouble swallowing.    Eyes:  Positive for visual disturbance (Blurry vision).   Gastrointestinal:  Positive for diarrhea, nausea and vomiting.   Neurological:  Positive for dizziness, tremors, speech difficulty, numbness and headaches.   Psychiatric/Behavioral:  Negative for confusion.    Objective:     Vital Signs (Most Recent):  Temp: 97.8 °F (36.6 °C) (03/10/23 1357)  Pulse: 72 (03/10/23 1357)  Resp: 14 (03/10/23 1357)  BP: 113/77 (03/10/23 1357)  SpO2: 96 % (03/10/23 1357)    Vital Signs Range (Last 24H):  Temp:  [97.6 °F (36.4 °C)-98.4 °F (36.9 °C)]   Pulse:  [61-88]   Resp:  [14-20]   BP: (105-216)/()   SpO2:  [93 %-100 %]     Physical Exam  Constitutional:       Appearance: She is obese.   HENT:      Head: Normocephalic.      Right Ear: External ear normal.      Left Ear: External ear normal.      Nose: Nose normal.   Pulmonary:      Effort: Pulmonary effort is normal. No respiratory distress.   Abdominal:      General: There is no distension.      Tenderness: There is no guarding.   Skin:     General: Skin is dry.    Neurological:      Mental Status: She is alert.       Neurological Exam:   LOC: alert  Attention Span: Good   Language: Stuttering speech  Articulation: Dysarthria  Orientation: Person, Place, Time   Facial Movement (CN VII): Symmetric facial expression      Neuro exam interrupted due to patient    Laboratory:  CMP:   Recent Labs   Lab 03/10/23  0517   CALCIUM 9.0   ALBUMIN 3.1*   PROT 6.6      K 4.1   CO2 16*      BUN 31*   CREATININE 1.6*   ALKPHOS 106   ALT 10   AST 10   BILITOT 0.3     CBC:   Recent Labs   Lab 03/10/23  0517   WBC 10.05   RBC 4.98   HGB 12.3   HCT 40.7      MCV 82   MCH 24.7*   MCHC 30.2*     Lipid Panel: No results for input(s): CHOL, LDLCALC, HDL, TRIG in the last 168 hours.  Coagulation: No results for input(s): PT, INR, APTT in the last 168 hours.  Hgb A1C: No results for input(s): HGBA1C in the last 168 hours.  TSH: No results for input(s): TSH in the last 168 hours.    Diagnostic Results:      Brain imaging:  3/9/2023 CT head  No early infarct changes.  No hemorrhage.  No mass effect.    3/10/2023 CT head  No acute changes when compared to 3/9 scan.    Vessel Imaging:  pending    Cardiac Evaluation:   3/9/2023 TTE  EF 65%; no wall motion abnormality; no thrombus/vegetation; normal left atrium.        Emi Rome NP  [facility]  Department of Vascular Neurology   '[facility])

## 2023-03-10 NOTE — ASSESSMENT & PLAN NOTE
- admitted 3/9 for SBP in the 200s  - hold hypertensive meds at this time and allow for SBP to run up to 220 mmHg until neuro imaging obtained to eval for ischemic stroke

## 2023-03-10 NOTE — NURSING
1355: pt working with SLP, c/o sudden dizziness and vomiting, HR showing SB 30s on the monitor.  HR back up to 60s-70s /77. Dr. Mariano notified, ordered STAT EKG and IVF bolus. Pt still c/o dizziness and nausea.    1445: at bedside with pt during tele vascular neurology consult.  Pt c/o sudden dizziness and and began vomiting again, HR dropped as low as 27 on monitor and came back up to 50s. Dr. Mariano notified and arrived to bedside shortly. Pt to transfer to ICU

## 2023-03-10 NOTE — ASSESSMENT & PLAN NOTE
- new onset 3/10  - cardiology consulted   - transferred to the ICU for possible dopamine infusion  - hold further BB  - continue telemetry

## 2023-03-10 NOTE — PT/OT/SLP EVAL
"Speech Language Pathology Evaluation  Cognitive/Bedside Swallow    Patient Name:  Susu Luther   MRN:  [id number]  Admitting Diagnosis: <principal problem not specified>    Recommendations:                  General Recommendations:  Dysphagia therapy and Speech/language therapy  Diet recommendations:  Regular Diet - IDDSI Level 7, Thin liquids - IDDSI Level 0   Aspiration Precautions: Standard aspiration precautions   General Precautions: Standard, aspiration  Communication strategies:  none    History:     Past Medical History:   Diagnosis Date    Chronic diastolic congestive heart failure 2020    Encounter for blood transfusion     History of repair of aneurysm of abdominal aorta using endovascular stent graft     DR Bonds     of psychiatric care     Hypertension     Major depressive disorder, single episode, moderate with anxious distress 2020    Malignant neoplasm of upper-outer quadrant of right breast in female, estrogen receptor positive 3/14/2019    radiation    Psychiatric problem     Sleep apnea     Sleep difficulties     Stroke     no residual defect    Therapy        Past Surgical History:   Procedure Laterality Date    APPENDECTOMY      AXILLARY NODE DISSECTION Right 2020    Procedure: LYMPHADENECTOMY, AXILLARY;  Surgeon: Artemio Starks MD;  Location: [facility];  Service: General;  Laterality: Right;    BIOPSY OF AXILLARY NODE Right 2021    Procedure: BIOPSY, LYMPH NODE, AXILLARY;  Surgeon: Artemio Sutton MD;  Location: [address];  Service: General;  Laterality: Right;    BREAST BIOPSY      2019    BREAST LUMPECTOMY      2019     SECTION      x 1    OOPHORECTOMY      right     SENTINEL LYMPH NODE BIOPSY Right 2019    Procedure: BIOPSY, LYMPH NODE, SENTINEL;  Surgeon: Artemio Starks MD;  Location: [facility];  Service: General;  Laterality: Right;    splenic artery aneurysm repair      TONSILLECTOMY       MRI from 2022:   "Impression:  No " "acute intracranial abnormality.  Cerebellar tonsillar ectopia.     Electronically signed by: Rogelio Lombardoews  Date:                                            06/15/2022"    Subjective   Patient seen at bedside. She reported she had eaten lunch with no difficulty. She reported slurred speech and a stutter that has been progressively worsening. She reported her speech worsens the longer she talks.   An oral mechanism exam was completed. However, prior to any PO trials, patient stated she was very dizzy and nauseous. She was given an emesis bag and began to vomit. Nursing arrived and the evaluation was discontinued.     Patient goals: To improve speech.      Pain/Comfort:  Pain Rating 1: 0/10  Pain Rating Post-Intervention 1: 0/10  Pain Rating 2: 0/10  Pain Rating Post-Intervention 2: 0/10    Respiratory Status: Room air    Objective:     Cognitive Status:    Patient oriented/alert. She was able to participate in conversational tasks with no difficulty.        Receptive Language:   Comprehension:   WFL: Patient able to answer all questions appropriately. No observable comprehension deficits.     Pragmatics:    WFL: No observable deficits.     Expressive Language:  Verbal:    WFL: Patient able to participate in conversational tasks with no difficulty. No observable expressive language deficits.       Motor Speech:  Patient 100% intelligible at the conversational level. However, some imprecise articulation and syllable repetitions noted. Unable to discern if this is due to a true dysarthria vs. Neurogenic stutter.     Voice:   WFL    Oral Musculature Evaluation  Oral Musculature: general weakness  Dentition: other (see comments), partials, lower dentures (Patient reported wearing partials but did not have them with her.)  Secretion Management: problems swallowing secretions  Mucosal Quality: good  Mandibular Strength and Mobility: impaired  Oral Labial Strength and Mobility: impaired retraction  Lingual Strength and " Mobility: impaired strength  Velar Elevation: WFL  Buccal Strength and Mobility: impaired  Volitional Cough: Present/productive  Volitional Swallow: Present  Voice Prior to PO Intake: WFL  Oral Musculature Comments: Generalized weakness/abnormal movement    Bedside Swallow Eval:   Prior to any PO trials, patient became dizzy and nauseas and began to vomit. Evaluation was discontinued. It is recommended she continue with current diet. ST will follow up with ongoing swallow assessment.     Assessment:     Susu Luther is a 73 y.o. female with an SLP diagnosis of  suspected dysarthria versus neurogenic stutter .  She presents with imprecise articulation and sound/syllable repetitions. It is possible dysarthria symptoms are secondary to Cerebellar tonsillar ectopia as seen on her MRI. It is recommended she continue with current diet. She will benefit from continued ST to address the above deficits.     Goals:   Multidisciplinary Problems       SLP Goals          Problem: SLP    Goal Priority Disciplines Outcome   SLP Goal     SLP    Description: 1. Ongoing S/E  2. Patient will demonstrate use of motor speech/fluency strategies at conversational level.                        Plan:     Patient to be seen:  2 x/week, 3 x/week   Plan of Care expires:  03/17/23  Plan of Care reviewed with:  patient   SLP Follow-Up:          Discharge recommendations:  Discharge Facility/Level of Care Needs: other (see comments) (pending acute progress)   Barriers to Discharge:  None    Time Tracking:     SLP Treatment Date:   03/10/23  Speech Start Time:  1335  Speech Stop Time:  1355     Speech Total Time (min):  20 min    Billable Minutes: Eval 20 minutes      03/10/2023

## 2023-03-10 NOTE — NURSING
Pt  bladder scanned volume was 344 mL, pt having difficulty voiding. Pt straight cath, 400mL of urine received. External purewick catheter placed.

## 2023-03-11 LAB
ALBUMIN SERPL BCP-MCNC: 3.1 G/DL (ref 3.5–5.2)
ALBUMIN SERPL BCP-MCNC: 3.1 G/DL (ref 3.5–5.2)
ALP SERPL-CCNC: 108 U/L (ref 55–135)
ALP SERPL-CCNC: 108 U/L (ref 55–135)
ALT SERPL W/O P-5'-P-CCNC: 12 U/L (ref 10–44)
ALT SERPL W/O P-5'-P-CCNC: 12 U/L (ref 10–44)
ANION GAP SERPL CALC-SCNC: 10 MMOL/L (ref 8–16)
AST SERPL-CCNC: 12 U/L (ref 10–40)
AST SERPL-CCNC: 12 U/L (ref 10–40)
BASOPHILS # BLD AUTO: 0.06 K/UL (ref 0–0.2)
BASOPHILS NFR BLD: 0.5 % (ref 0–1.9)
BILIRUB DIRECT SERPL-MCNC: 0.1 MG/DL (ref 0.1–0.3)
BILIRUB SERPL-MCNC: 0.2 MG/DL (ref 0.1–1)
BILIRUB SERPL-MCNC: 0.2 MG/DL (ref 0.1–1)
BUN SERPL-MCNC: 28 MG/DL (ref 8–23)
CALCIUM SERPL-MCNC: 9.2 MG/DL (ref 8.7–10.5)
CHLORIDE SERPL-SCNC: 108 MMOL/L (ref 95–110)
CO2 SERPL-SCNC: 21 MMOL/L (ref 23–29)
CREAT SERPL-MCNC: 1.4 MG/DL (ref 0.5–1.4)
DIFFERENTIAL METHOD: ABNORMAL
EOSINOPHIL # BLD AUTO: 0.1 K/UL (ref 0–0.5)
EOSINOPHIL NFR BLD: 0.5 % (ref 0–8)
ERYTHROCYTE [DISTWIDTH] IN BLOOD BY AUTOMATED COUNT: 16.6 % (ref 11.5–14.5)
EST. GFR  (NO RACE VARIABLE): 40 ML/MIN/1.73 M^2
GLUCOSE SERPL-MCNC: 111 MG/DL (ref 70–110)
HCT VFR BLD AUTO: 36.8 % (ref 37–48.5)
HGB BLD-MCNC: 11.8 G/DL (ref 12–16)
IMM GRANULOCYTES # BLD AUTO: 0.05 K/UL (ref 0–0.04)
IMM GRANULOCYTES NFR BLD AUTO: 0.5 % (ref 0–0.5)
INR PPP: 1.1 (ref 0.8–1.2)
LYMPHOCYTES # BLD AUTO: 2.2 K/UL (ref 1–4.8)
LYMPHOCYTES NFR BLD: 20 % (ref 18–48)
MAGNESIUM SERPL-MCNC: 1.8 MG/DL (ref 1.6–2.6)
MCH RBC QN AUTO: 25 PG (ref 27–31)
MCHC RBC AUTO-ENTMCNC: 32.1 G/DL (ref 32–36)
MCV RBC AUTO: 78 FL (ref 82–98)
MONOCYTES # BLD AUTO: 1 K/UL (ref 0.3–1)
MONOCYTES NFR BLD: 9.4 % (ref 4–15)
NEUTROPHILS # BLD AUTO: 7.6 K/UL (ref 1.8–7.7)
NEUTROPHILS NFR BLD: 69.1 % (ref 38–73)
NRBC BLD-RTO: 0 /100 WBC
PLATELET # BLD AUTO: 241 K/UL (ref 150–450)
PMV BLD AUTO: 9.8 FL (ref 9.2–12.9)
POCT GLUCOSE: 102 MG/DL (ref 70–110)
POCT GLUCOSE: 108 MG/DL (ref 70–110)
POTASSIUM SERPL-SCNC: 4 MMOL/L (ref 3.5–5.1)
PROT SERPL-MCNC: 6.8 G/DL (ref 6–8.4)
PROT SERPL-MCNC: 6.8 G/DL (ref 6–8.4)
PROTHROMBIN TIME: 11.9 SEC (ref 9–12.5)
RBC # BLD AUTO: 4.72 M/UL (ref 4–5.4)
SODIUM SERPL-SCNC: 139 MMOL/L (ref 136–145)
WBC # BLD AUTO: 10.96 K/UL (ref 3.9–12.7)

## 2023-03-11 PROCEDURE — 25500020 PHARM REV CODE 255: Mod: HCNC | Performed by: INTERNAL MEDICINE

## 2023-03-11 PROCEDURE — 99900035 HC TECH TIME PER 15 MIN (STAT): Mod: HCNC

## 2023-03-11 PROCEDURE — 63600175 PHARM REV CODE 636 W HCPCS: Mod: HCNC | Performed by: NURSE PRACTITIONER

## 2023-03-11 PROCEDURE — A9585 GADOBUTROL INJECTION: HCPCS | Mod: HCNC | Performed by: INTERNAL MEDICINE

## 2023-03-11 PROCEDURE — 85025 COMPLETE CBC W/AUTO DIFF WBC: CPT | Mod: HCNC | Performed by: NURSE PRACTITIONER

## 2023-03-11 PROCEDURE — 94761 N-INVAS EAR/PLS OXIMETRY MLT: CPT | Mod: HCNC

## 2023-03-11 PROCEDURE — 85610 PROTHROMBIN TIME: CPT | Mod: HCNC | Performed by: INTERNAL MEDICINE

## 2023-03-11 PROCEDURE — 21400001 HC TELEMETRY ROOM: Mod: HCNC

## 2023-03-11 PROCEDURE — 80053 COMPREHEN METABOLIC PANEL: CPT | Mod: HCNC | Performed by: NURSE PRACTITIONER

## 2023-03-11 PROCEDURE — 83735 ASSAY OF MAGNESIUM: CPT | Mod: HCNC | Performed by: NURSE PRACTITIONER

## 2023-03-11 PROCEDURE — 99233 PR SUBSEQUENT HOSPITAL CARE,LEVL III: ICD-10-PCS | Mod: HCNC,,, | Performed by: INTERNAL MEDICINE

## 2023-03-11 PROCEDURE — 51702 INSERT TEMP BLADDER CATH: CPT | Mod: HCNC

## 2023-03-11 PROCEDURE — 27000221 HC OXYGEN, UP TO 24 HOURS: Mod: HCNC

## 2023-03-11 PROCEDURE — 63600175 PHARM REV CODE 636 W HCPCS: Mod: HCNC | Performed by: INTERNAL MEDICINE

## 2023-03-11 PROCEDURE — 63600175 PHARM REV CODE 636 W HCPCS: Mod: TB,JG,HCNC | Performed by: NURSE PRACTITIONER

## 2023-03-11 PROCEDURE — 25000003 PHARM REV CODE 250: Mod: HCNC | Performed by: NURSE PRACTITIONER

## 2023-03-11 PROCEDURE — 99233 SBSQ HOSP IP/OBS HIGH 50: CPT | Mod: HCNC,,, | Performed by: INTERNAL MEDICINE

## 2023-03-11 PROCEDURE — 25000003 PHARM REV CODE 250: Mod: HCNC | Performed by: INTERNAL MEDICINE

## 2023-03-11 RX ORDER — FAMOTIDINE 20 MG/1
20 TABLET, FILM COATED ORAL 2 TIMES DAILY
Status: DISCONTINUED | OUTPATIENT
Start: 2023-03-11 | End: 2023-03-15 | Stop reason: HOSPADM

## 2023-03-11 RX ORDER — CHLORHEXIDINE GLUCONATE ORAL RINSE 1.2 MG/ML
15 SOLUTION DENTAL 2 TIMES DAILY
Status: DISCONTINUED | OUTPATIENT
Start: 2023-03-11 | End: 2023-03-15 | Stop reason: HOSPADM

## 2023-03-11 RX ORDER — MORPHINE SULFATE 2 MG/ML
2 INJECTION, SOLUTION INTRAMUSCULAR; INTRAVENOUS ONCE
Status: COMPLETED | OUTPATIENT
Start: 2023-03-11 | End: 2023-03-11

## 2023-03-11 RX ORDER — AMLODIPINE BESYLATE 5 MG/1
5 TABLET ORAL DAILY
Status: DISCONTINUED | OUTPATIENT
Start: 2023-03-11 | End: 2023-03-13

## 2023-03-11 RX ORDER — MUPIROCIN 20 MG/G
OINTMENT TOPICAL 2 TIMES DAILY
Status: DISCONTINUED | OUTPATIENT
Start: 2023-03-11 | End: 2023-03-15 | Stop reason: HOSPADM

## 2023-03-11 RX ORDER — GADOBUTROL 604.72 MG/ML
10 INJECTION INTRAVENOUS
Status: COMPLETED | OUTPATIENT
Start: 2023-03-11 | End: 2023-03-11

## 2023-03-11 RX ADMIN — FAMOTIDINE 20 MG: 20 TABLET ORAL at 08:03

## 2023-03-11 RX ADMIN — APIXABAN 5 MG: 2.5 TABLET, FILM COATED ORAL at 08:03

## 2023-03-11 RX ADMIN — ONDANSETRON 4 MG: 2 INJECTION INTRAMUSCULAR; INTRAVENOUS at 06:03

## 2023-03-11 RX ADMIN — GADOBUTROL 10 ML: 604.72 INJECTION INTRAVENOUS at 05:03

## 2023-03-11 RX ADMIN — OXYBUTYNIN CHLORIDE 5 MG: 5 TABLET ORAL at 08:03

## 2023-03-11 RX ADMIN — RANOLAZINE 1000 MG: 500 TABLET, EXTENDED RELEASE ORAL at 08:03

## 2023-03-11 RX ADMIN — HYDRALAZINE HYDROCHLORIDE 10 MG: 20 INJECTION, SOLUTION INTRAMUSCULAR; INTRAVENOUS at 07:03

## 2023-03-11 RX ADMIN — CHLORHEXIDINE GLUCONATE 0.12% ORAL RINSE 15 ML: 1.2 LIQUID ORAL at 08:03

## 2023-03-11 RX ADMIN — ALPRAZOLAM 0.5 MG: 0.5 TABLET ORAL at 02:03

## 2023-03-11 RX ADMIN — FAMOTIDINE 20 MG: 20 TABLET, FILM COATED ORAL at 08:03

## 2023-03-11 RX ADMIN — MORPHINE SULFATE 2 MG: 2 INJECTION, SOLUTION INTRAMUSCULAR; INTRAVENOUS at 08:03

## 2023-03-11 RX ADMIN — ACETAMINOPHEN 650 MG: 325 TABLET ORAL at 12:03

## 2023-03-11 RX ADMIN — DOPAMINE HYDROCHLORIDE 2.5 MCG/KG/MIN: 160 INJECTION, SOLUTION INTRAVENOUS at 04:03

## 2023-03-11 RX ADMIN — SODIUM CHLORIDE: 9 INJECTION, SOLUTION INTRAVENOUS at 02:03

## 2023-03-11 RX ADMIN — LETROZOLE 2.5 MG: 2.5 TABLET, FILM COATED ORAL at 08:03

## 2023-03-11 RX ADMIN — ALPRAZOLAM 0.5 MG: 0.5 TABLET ORAL at 08:03

## 2023-03-11 RX ADMIN — AMLODIPINE BESYLATE 5 MG: 5 TABLET ORAL at 06:03

## 2023-03-11 RX ADMIN — MUPIROCIN: 20 OINTMENT TOPICAL at 08:03

## 2023-03-11 RX ADMIN — BUTALBITAL, ACETAMINOPHEN AND CAFFEINE 1 TABLET: 50; 325; 40 TABLET ORAL at 02:03

## 2023-03-11 NOTE — HOSPITAL COURSE
3/11: pt with improved HR and BP overnight but with HA, dopamine decreased to 2.5 mcg/kg/min, MRI/MRA brain pending, pt denies any decreased sensation or weakness or vision issues or vertigo or other issues this AM, discussed plan of care with pt and nurse

## 2023-03-11 NOTE — PT/OT/SLP PROGRESS
Speech Language Pathology      Susu Luther  MRN: [id number]    Patient not seen today secondary to refused due to c/o HA. Will follow-up at a later time.    Delma Luna, ALAN-SLP

## 2023-03-11 NOTE — PLAN OF CARE
[facility] - Intensive Care (Hospital)  Initial Discharge Assessment       Primary Care Provider: Peace Arias MD    Admission Diagnosis: HTN (hypertension) [I10]  Chest pain [R07.9]  Near syncope [R55]  Malignant neoplasm of upper-outer quadrant of right breast in female, estrogen receptor positive [C50.411, Z17.0]  Mass of right breast, unspecified quadrant [N63.10]    Admission Date: 3/9/2023  Expected Discharge Date:     Discharge Barriers Identified: None    Payor: HU[employer] MANAGED MEDICARE / Plan: HU[employer]NA TOTAL CARE ADVANTAGE / Product Type: Medicare Advantage /     Extended Emergency Contact Information  Primary Emergency Contact: Campos Luther  Address: [address] [place] [address]  Home Phone: [phone]  Mobile Phone: [phone]  Relation: Spouse  Secondary Emergency Contact: rena april           [facility]  Mobile Phone: [phone]  Relation: Daughter  Preferred language: English   needed? No    Discharge Plan A: Home, Home with family  Discharge Plan B: Home, Home with family      [facility] [place] - [address] [facility]  [address] [facility] [address]  Phone: [phone] Fax: [phone]    [facility] - [facility] [address]  [address] [facility] [address]  Phone: [phone] Fax: [phone]      Initial Assessment (most recent)       Adult Discharge Assessment - 03/11/23 1039          Discharge Assessment    Assessment Type Discharge Planning Assessment     Confirmed/corrected address, phone number and insurance Yes     Confirmed Demographics Correct on Facesheet     Source of Information patient     Communicated MARTA with patient/caregiver Date not available/Unable to determine     Reason For Admission Hypertension     People in Home spouse     Facility Arrived From: Home     Do you expect to return to your current living situation?  Yes     Do you have help at home or someone to help you manage your care at home? Yes     Who are your caregiver(s) and their phone number(s)? Spouse, Campos     Prior to hospitilization cognitive status: Alert/Oriented     Current cognitive status: Alert/Oriented     Walking or Climbing Stairs ambulation difficulty, requires equipment     Mobility Management walker and cane     Equipment Currently Used at Home rollator;cane, straight     Readmission within 30 days? No     Patient currently being followed by outpatient case management? No     Do you currently have service(s) that help you manage your care at home? No     Do you take prescription medications? Yes     Do you have prescription coverage? Yes     Do you have any problems affording any of your prescribed medications? No     Is the patient taking medications as prescribed? yes     Who is going to help you get home at discharge? Pts spouse     How do you get to doctors appointments? family or friend will provide;car, drives self     Are you on dialysis? No     Do you take coumadin? No     Discharge Plan A Home;Home with family     Discharge Plan B Home;Home with family     DME Needed Upon Discharge  none     Discharge Plan discussed with: Patient     Discharge Barriers Identified None                   Anticipated DC dispo: Home; home with family   Prior Level of Function: Live at home with spouse, ambulates with DME: cane and walker ([facility]), independent    PCP: MICHAEL Arias MD ([facility])      Comments: No anticipated CM needs identified during assessment.

## 2023-03-11 NOTE — ASSESSMENT & PLAN NOTE
- NIPPV qHS and PRN naps when pt is no longer nauseated   - affecting medical decision making and complicating the above   - weight loss and lifestyle modifications would be beneficial

## 2023-03-11 NOTE — SUBJECTIVE & OBJECTIVE
Interval History:       Complaining of intermittent episodes of headache, document dose decreased, plan to maintain high blood pressure with systolic blood pressure up to 220 until patient undergoes imaging study.    Pending MRI, MRA brain.    Follow up with Neurology   Under ICU care;   Troponin x1 negative, no electrolyte abnormalities noted, lactic acid 1.1       Review of Systems    Cardiovascular:  denied chest pain  Neurological:  Positive for dizziness and headaches,   All other systems reviewed and are negative.  Objective:     Vital Signs (Most Recent):  Temp: 97.7 °F (36.5 °C) (03/11/23 0400)  Pulse: 66 (03/11/23 0739)  Resp: (!) 30 (03/11/23 0739)  BP: (!) 186/91 (03/11/23 0700)  SpO2: 100 % (03/11/23 0739)   Vital Signs (24h Range):  Temp:  [97.6 °F (36.4 °C)-98 °F (36.7 °C)] 97.7 °F (36.5 °C)  Pulse:  [27-86] 66  Resp:  [14-58] 30  SpO2:  [93 %-100 %] 100 %  BP: ()/() 186/91     Weight: 135.7 kg (299 lb 2.6 oz)  Body mass index is 44.18 kg/m².    Intake/Output Summary (Last 24 hours) at 3/11/2023 1127  Last data filed at 3/11/2023 0600  Gross per 24 hour   Intake 2970.49 ml   Output 1800 ml   Net 1170.49 ml      Physical Exam    Constitutional:       General: She is awake. She is not in acute distress.     Appearance: She is obese.   Cardiovascular:      Rate and Rhythm: Normal rate and regular rhythm.      Pulses:           Radial pulses are 2+ on the right side and 2+ on the left side.      Pulmonary:      Effort: Pulmonary effort is normal.      Breath sounds: Normal breath sounds.   Abdominal:      General: There is no distension.      Palpations: Abdomen is soft.      Tenderness: There is no abdominal tenderness.   Musculoskeletal:   General: No swelling.   Skin:     General: Skin is warm and dry.   Neurological:      General: No focal deficit present.      Mental Status: She is alert.      Comments: strength 5/5 in upper and lower extremities; sensation intact;   Psychiatric:          Behavior: Behavior is cooperative.          Significant Labs: All pertinent labs within the past 24 hours have been reviewed.  CBC:   Recent Labs   Lab 03/09/23  1508 03/10/23  0517 03/11/23  0422   WBC 11.54 10.05 10.96   HGB 14.0 12.3 11.8*   HCT 43.3 40.7 36.8*    203 241     CMP:   Recent Labs   Lab 03/09/23  1508 03/10/23  0517 03/10/23  1526 03/11/23  0422    138 138 139   K 4.3 4.1 4.1 4.0    109 107 108   CO2 21* 16* 18* 21*   * 100 129* 111*   BUN 29* 31* 31* 28*   CREATININE 2.1* 1.6* 1.7* 1.4   CALCIUM 10.2 9.0 9.1 9.2   PROT 8.2 6.6  --  6.8  6.8   ALBUMIN 3.7 3.1*  --  3.1*  3.1*   BILITOT 0.4 0.3  --  0.2  0.2   ALKPHOS 131 106  --  108  108   AST 13 10  --  12  12   ALT 12 10  --  12  12   ANIONGAP 13 13 13 10       Significant Imaging:     Imaging Results              US Renal Artery Stenosis Hyperten (xpd) (Final result)  Result time 03/10/23 13:30:29      Final result by Skinny Warren MD (03/10/23 13:30:29)                   Impression:      Technically difficult examination no acute abnormality.  Bilateral chronic medical renal disease findings.  No evidence of renal artery stenosis.      Electronically signed by: Skinny Warren  Date:    03/10/2023  Time:    13:30               Narrative:    EXAMINATION:  Kidneys and renal arteries with Doppler    CLINICAL HISTORY:  Uncontrolled hypertension.    TECHNIQUE:  Grayscale, color, and Doppler ultrasound evaluation of the bilateral kidneys including the renal arteries for renal artery stenosis.  Technically difficult examination secondary to body habitus and positioning.    COMPARISON:  None    FINDINGS:  The right kidney measures 8.8cm in size and no hydronephrosis.  Segmental resistive indices were noted of 0.66-0.67.  No evidence of parvus tardus is noted within the segmental arteries.  The main renal artery peak systolic velocity is 128 cm/sec.    The left kidney measures 8.9cm in size and demonstrates no  hydronephrosis.  Segmental resistive indices were noted of 0.69-0.72.  No evidence of parvus tardus is noted in segmental renal arteries.  The main renal artery velocities range 170 cm/sec.    An aortic velocity is noted of 122 cm/sec.  This creates a right renal artery to aortic ratio of 1.1 and a left renal artery to aortic ratio of 1.4.    The renal veins appear patent bilaterally.                                       CT Head Without Contrast (Final result)  Result time 03/09/23 15:41:54      Final result by Leo Garcia MD (03/09/23 15:41:54)                   Impression:      No acute intracranial finding.    Alberta stroke programme early CT score (ASPECTS): 10      Electronically signed by: Leo Garcia  Date:    03/09/2023  Time:    15:41               Narrative:    EXAMINATION:  CT HEAD WITHOUT CONTRAST    CLINICAL HISTORY:  Syncope, recurrent;    TECHNIQUE:  Low dose axial CT images obtained throughout the head without intravenous contrast. Sagittal and coronal reconstructions were performed.  The CT exam was performed using one or more of the following dose reduction techniques- Automated exposure control, adjustment of the mA and/or kV according to patient size, and/or use of iterative reconstructed technique.    COMPARISON:  CT head June 13, 2022.  MRI brain Kecia 15, 2022.    FINDINGS:  Intracranial compartment:    Ventricles and sulci are normal in size for age without evidence of hydrocephalus. No extra-axial blood or fluid collections.    The brain parenchyma appears normal. No parenchymal mass, hemorrhage, edema or major vascular distribution infarct.  Cerebellar tonsil ectopia.    Skull/extracranial contents (limited evaluation): No fracture. Mastoid air cells and paranasal sinuses are essentially clear.                                       X-Ray Chest AP Portable (Final result)  Result time 03/09/23 15:16:40      Final result by JEAN-PAUL Matta Sr., MD (03/09/23 15:16:40)                    Impression:      Normal study.      Electronically signed by: Skinny Matta MD  Date:    03/09/2023  Time:    15:16               Narrative:    EXAMINATION:  XR CHEST AP PORTABLE    CLINICAL HISTORY:  htn;    COMPARISON:  06/13/2022    FINDINGS:  The size of the heart is normal. The lungs are clear. There is no pneumothorax.  The costophrenic angles are sharp.

## 2023-03-11 NOTE — SUBJECTIVE & OBJECTIVE
Review of Systems   Constitutional: Positive for malaise/fatigue.   HENT: Negative.     Eyes: Negative.    Cardiovascular: Negative.    Respiratory: Negative.     Endocrine: Negative.    Hematologic/Lymphatic: Negative.    Skin: Negative.    Musculoskeletal: Negative.    Gastrointestinal: Negative.    Genitourinary: Negative.    Neurological:  Positive for headaches and weakness.   Psychiatric/Behavioral: Negative.     Allergic/Immunologic: Negative.    Objective:     Vital Signs (Most Recent):  Temp: 97.7 °F (36.5 °C) (03/11/23 0400)  Pulse: 66 (03/11/23 0739)  Resp: (!) 30 (03/11/23 0739)  BP: (!) 186/91 (03/11/23 0700)  SpO2: 100 % (03/11/23 0739)   Vital Signs (24h Range):  Temp:  [97.7 °F (36.5 °C)-98 °F (36.7 °C)] 97.7 °F (36.5 °C)  Pulse:  [27-86] 66  Resp:  [14-58] 30  SpO2:  [94 %-100 %] 100 %  BP: ()/() 186/91     Weight: 135.7 kg (299 lb 2.6 oz)  Body mass index is 44.18 kg/m².     SpO2: 100 %         Intake/Output Summary (Last 24 hours) at 3/11/2023 1146  Last data filed at 3/11/2023 0600  Gross per 24 hour   Intake 2970.49 ml   Output 1800 ml   Net 1170.49 ml       Lines/Drains/Airways       Central Venous Catheter Line  Duration             Percutaneous Central Line Insertion/Assessment - Triple Lumen  03/10/23 Internal Jugular Right 1 day              Drain  Duration                  Closed/Suction Drain 03/02/21 1625 Right Breast Bulb 19 Fr. 738 days         Urethral Catheter 03/11/23 0330 Non-latex 16 Fr. <1 day              Peripheral Intravenous Line  Duration                  Peripheral IV - Single Lumen 03/09/23 1600 20 G Anterior;Left Antecubital 1 day                    Physical Exam  Vitals and nursing note reviewed.   Constitutional:       General: She is not in acute distress.     Appearance: Normal appearance. She is well-developed. She is not ill-appearing or diaphoretic.   HENT:      Head: Normocephalic.   Neck:      Thyroid: No thyromegaly.      Vascular: No carotid  bruit or JVD.   Cardiovascular:      Rate and Rhythm: Normal rate and regular rhythm.      Pulses: Normal pulses.           Radial pulses are 2+ on the right side and 2+ on the left side.      Heart sounds: Normal heart sounds, S1 normal and S2 normal. No murmur heard.    No friction rub. No gallop.   Pulmonary:      Effort: Pulmonary effort is normal.      Breath sounds: Normal breath sounds. No wheezing or rales.   Abdominal:      General: Bowel sounds are decreased. There is no abdominal bruit.      Palpations: Abdomen is soft.      Tenderness: There is no abdominal tenderness.   Musculoskeletal:      Cervical back: Neck supple.   Lymphadenopathy:      Cervical: No cervical adenopathy.   Skin:     General: Skin is warm.   Neurological:      Mental Status: She is alert and oriented to person, place, and time.   Psychiatric:         Behavior: Behavior normal. Behavior is cooperative.       Significant Labs: ABG: No results for input(s): PH, PCO2, HCO3, POCSATURATED, BE in the last 48 hours., Blood Culture: No results for input(s): LABBLOO in the last 48 hours., BMP:   Recent Labs   Lab 03/10/23  0517 03/10/23  1526 03/11/23  0422    129* 111*    138 139   K 4.1 4.1 4.0    107 108   CO2 16* 18* 21*   BUN 31* 31* 28*   CREATININE 1.6* 1.7* 1.4   CALCIUM 9.0 9.1 9.2   MG  --  1.9 1.8   , CMP   Recent Labs   Lab 03/09/23  1508 03/10/23  0517 03/10/23  1526 03/11/23  0422    138 138 139   K 4.3 4.1 4.1 4.0    109 107 108   CO2 21* 16* 18* 21*   * 100 129* 111*   BUN 29* 31* 31* 28*   CREATININE 2.1* 1.6* 1.7* 1.4   CALCIUM 10.2 9.0 9.1 9.2   PROT 8.2 6.6  --  6.8  6.8   ALBUMIN 3.7 3.1*  --  3.1*  3.1*   BILITOT 0.4 0.3  --  0.2  0.2   ALKPHOS 131 106  --  108  108   AST 13 10  --  12  12   ALT 12 10  --  12  12   ANIONGAP 13 13 13 10   , CBC   Recent Labs   Lab 03/09/23  1508 03/10/23  0517 03/11/23  0422   WBC 11.54 10.05 10.96   HGB 14.0 12.3 11.8*   HCT 43.3 40.7 36.8*     203 241   , INR   Recent Labs   Lab 03/11/23  0422   INR 1.1   , Lipid Panel No results for input(s): CHOL, HDL, LDLCALC, TRIG, CHOLHDL in the last 48 hours., and Troponin   Recent Labs   Lab 03/09/23  1508 03/10/23  1526   TROPONINI 0.021 0.015       Significant Imaging: Echocardiogram: Transthoracic echo (TTE) complete (Cupid Only):   Results for orders placed or performed during the hospital encounter of 03/09/23   Echo   Result Value Ref Range    BSA 2.58 m2    TDI SEPTAL 0.07 m/s    LV LATERAL E/E' RATIO 5.89 m/s    LV SEPTAL E/E' RATIO 7.57 m/s    LA WIDTH 4.20 cm    IVC diameter 1.5 cm    Left Ventricular Outflow Tract Mean Velocity 1.02 cm/s    Left Ventricular Outflow Tract Mean Gradient 4.30 mmHg    TV mean gradient 19 mmHg    TDI LATERAL 0.09 m/s    LVIDd 4.41 3.5 - 6.0 cm    IVS 1.60 (A) 0.6 - 1.1 cm    Posterior Wall 1.49 (A) 0.6 - 1.1 cm    Ao root annulus 3.15 cm    LVIDs 2.57 2.1 - 4.0 cm    FS 42 28 - 44 %    LA volume 67.89 cm3    STJ 2.94 cm    Ascending aorta 2.67 cm    LV mass 280.24 g    LA size 3.64 cm    TAPSE 2.00 cm    Left Ventricle Relative Wall Thickness 0.68 cm    AV mean gradient 6 mmHg    AV valve area 2.86 cm2    AV Velocity Ratio 0.79     AV index (prosthetic) 0.91     MV valve area p 1/2 method 2.79 cm2    E/A ratio 0.82     Mean e' 0.08 m/s    E wave deceleration time 272.20 msec    IVRT 76.12 msec    LVOT diameter 2.00 cm    LVOT area 3.1 cm2    LVOT peak henry 1.19 m/s    LVOT peak VTI 28.80 cm    Ao peak henry 1.50 m/s    Ao VTI 31.6 cm    RVOT peak henry 1.04 m/s    RVOT peak VTI 25.1 cm    LVOT stroke volume 90.43 cm3    AV peak gradient 9 mmHg    PV mean gradient 2.75 mmHg    E/E' ratio 6.63 m/s    MV Peak E Henry 0.53 m/s    TR Max Henry 2.60 m/s    MV stenosis pressure 1/2 time 78.94 ms    MV Peak A Henry 0.65 m/s    LV Systolic Volume 23.99 mL    LV Systolic Volume Index 9.8 mL/m2    LV Diastolic Volume 87.94 mL    LV Diastolic Volume Index 35.75 mL/m2    LA Volume Index  27.6 mL/m2    LV Mass Index 114 g/m2    RA Major Axis 3.89 cm    Left Atrium Minor Axis 5.40 cm    Left Atrium Major Axis 5.06 cm    Triscuspid Valve Regurgitation Peak Gradient 27 mmHg    RA Width 3.00 cm    Right Atrial Pressure (from IVC) 3 mmHg    EF 65 %    TV rest pulmonary artery pressure 30 mmHg    Narrative    · The left ventricle is normal in size with concentric hypertrophy and   normal systolic function.  · The estimated ejection fraction is 65%.  · Normal left ventricular diastolic function.  · Normal right ventricular size with normal right ventricular systolic   function.  · Normal central venous pressure (3 mmHg).  · The estimated PA systolic pressure is 30 mmHg.

## 2023-03-11 NOTE — PROGRESS NOTES
[facility] - Intensive Care ([facility])  [facility] Medicine  Progress Note    Patient Name: Susu Luther  MRN: [id number]  Patient Class: OP- Observation   Admission Date: 3/9/2023  Length of Stay: 0 days  Attending Physician: Jarad Yousif MD  Primary Care Provider: Peace Arias MD        Subjective:     Principal Problem:Hypertension        HPI:  The patient is 74 yo female with past medical history of right breast cancer, hypertension, anxiety, AAA s/p repair, CVA, diastolic heart failure, TANNER, sleep apnea, and obesity who presented to the ED due to elevated blood pressure and dizziness. She reports her systolic blood pressure has been in the 200s at home. She has a headache and is dizzy. She went to her PCP to be evaluated and had  near syncopal episode. Patient is anxious and tearful when discussing her blood pressure. She is concerned about a lump she found in upper portion of her right breast. She states she forgot to mention it to Dr. Arias. Discussed ordering outpatient US as breast US are not usually done while patients are in the hospital. She verbalized understanding. Hospital medicine consulted. Patient noted to have TANNER. Patient placed in observation.      Overview/Hospital Course:  3/10  worsening slurring and  stuttering while speaking that has been worsening since last night.--.  We will follow up on stat CT head, Neurology evaluation.    Blood pressure is stable on current regimen.    Pending mammogram, ultrasound renal artery   Labs reviewed   Follow up on SLP evaluation      Update:  At around 1:00 p.m., noted to have episodes of bradycardia with heart rate in 20s to 30s, stat EKG did not show blocks, on examination at bedside, patient complaining of dizziness, nausea, vomiting, blurring of vision; unable to complete evaluation by tele vascular Neurology due to acute changes.    Given findings of symptomatic bradycardia, consulted Cardiology, recommended atropine versus dopamine initiation.   As per charge nurse, atropine will not be able to be done on the floor, hence due to consideration of possible dopamine drip, consulted ICU.    ICU team evaluated the patient, agreed for the transfer to ICU for dopamine drip.      MRI brain with and without contrast, MRA head and neck ordered, TSH, electrolytes ordered.    Ordered lactic acid   Transferred to ICU     3/11   Complaining of intermittent episodes of headache, document dose decreased, plan to maintain high blood pressure with systolic blood pressure up to 220 until patient undergoes imaging study.    Pending MRI, MRA brain.    Follow up with Neurology   Under ICU care;   Troponin x1 negative, no electrolyte abnormalities noted, lactic acid 1.1                 Interval History:       Complaining of intermittent episodes of headache, document dose decreased, plan to maintain high blood pressure with systolic blood pressure up to 220 until patient undergoes imaging study.    Pending MRI, MRA brain.    Follow up with Neurology   Under ICU care;   Troponin x1 negative, no electrolyte abnormalities noted, lactic acid 1.1       Review of Systems    Cardiovascular:  denied chest pain  Neurological:  Positive for dizziness and headaches,   All other systems reviewed and are negative.  Objective:     Vital Signs (Most Recent):  Temp: 97.7 °F (36.5 °C) (03/11/23 0400)  Pulse: 66 (03/11/23 0739)  Resp: (!) 30 (03/11/23 0739)  BP: (!) 186/91 (03/11/23 0700)  SpO2: 100 % (03/11/23 0739)   Vital Signs (24h Range):  Temp:  [97.6 °F (36.4 °C)-98 °F (36.7 °C)] 97.7 °F (36.5 °C)  Pulse:  [27-86] 66  Resp:  [14-58] 30  SpO2:  [93 %-100 %] 100 %  BP: ()/() 186/91     Weight: 135.7 kg (299 lb 2.6 oz)  Body mass index is 44.18 kg/m².    Intake/Output Summary (Last 24 hours) at 3/11/2023 1127  Last data filed at 3/11/2023 0600  Gross per 24 hour   Intake 2970.49 ml   Output 1800 ml   Net 1170.49 ml      Physical Exam    Constitutional:       General: She is awake.  She is not in acute distress.     Appearance: She is obese.   Cardiovascular:      Rate and Rhythm: Normal rate and regular rhythm.      Pulses:           Radial pulses are 2+ on the right side and 2+ on the left side.      Pulmonary:      Effort: Pulmonary effort is normal.      Breath sounds: Normal breath sounds.   Abdominal:      General: There is no distension.      Palpations: Abdomen is soft.      Tenderness: There is no abdominal tenderness.   Musculoskeletal:   General: No swelling.   Skin:     General: Skin is warm and dry.   Neurological:      General: No focal deficit present.      Mental Status: She is alert.      Comments: strength 5/5 in upper and lower extremities; sensation intact;   Psychiatric:         Behavior: Behavior is cooperative.          Significant Labs: All pertinent labs within the past 24 hours have been reviewed.  CBC:   Recent Labs   Lab 03/09/23  1508 03/10/23  0517 03/11/23  0422   WBC 11.54 10.05 10.96   HGB 14.0 12.3 11.8*   HCT 43.3 40.7 36.8*    203 241     CMP:   Recent Labs   Lab 03/09/23  1508 03/10/23  0517 03/10/23  1526 03/11/23  0422    138 138 139   K 4.3 4.1 4.1 4.0    109 107 108   CO2 21* 16* 18* 21*   * 100 129* 111*   BUN 29* 31* 31* 28*   CREATININE 2.1* 1.6* 1.7* 1.4   CALCIUM 10.2 9.0 9.1 9.2   PROT 8.2 6.6  --  6.8  6.8   ALBUMIN 3.7 3.1*  --  3.1*  3.1*   BILITOT 0.4 0.3  --  0.2  0.2   ALKPHOS 131 106  --  108  108   AST 13 10  --  12  12   ALT 12 10  --  12  12   ANIONGAP 13 13 13 10       Significant Imaging:     Imaging Results              US Renal Artery Stenosis Hyperten (xpd) (Final result)  Result time 03/10/23 13:30:29      Final result by Skinny Warren MD (03/10/23 13:30:29)                   Impression:      Technically difficult examination no acute abnormality.  Bilateral chronic medical renal disease findings.  No evidence of renal artery stenosis.      Electronically signed by: Skinny  Briana  Date:    03/10/2023  Time:    13:30               Narrative:    EXAMINATION:  Kidneys and renal arteries with Doppler    CLINICAL HISTORY:  Uncontrolled hypertension.    TECHNIQUE:  Grayscale, color, and Doppler ultrasound evaluation of the bilateral kidneys including the renal arteries for renal artery stenosis.  Technically difficult examination secondary to body habitus and positioning.    COMPARISON:  None    FINDINGS:  The right kidney measures 8.8cm in size and no hydronephrosis.  Segmental resistive indices were noted of 0.66-0.67.  No evidence of parvus tardus is noted within the segmental arteries.  The main renal artery peak systolic velocity is 128 cm/sec.    The left kidney measures 8.9cm in size and demonstrates no hydronephrosis.  Segmental resistive indices were noted of 0.69-0.72.  No evidence of parvus tardus is noted in segmental renal arteries.  The main renal artery velocities range 170 cm/sec.    An aortic velocity is noted of 122 cm/sec.  This creates a right renal artery to aortic ratio of 1.1 and a left renal artery to aortic ratio of 1.4.    The renal veins appear patent bilaterally.                                       CT Head Without Contrast (Final result)  Result time 03/09/23 15:41:54      Final result by Leo Garcia MD (03/09/23 15:41:54)                   Impression:      No acute intracranial finding.    [place]berta stroke programme early CT score (ASPECTS): 10      Electronically signed by: Leo Garcia  Date:    03/09/2023  Time:    15:41               Narrative:    EXAMINATION:  CT HEAD WITHOUT CONTRAST    CLINICAL HISTORY:  Syncope, recurrent;    TECHNIQUE:  Low dose axial CT images obtained throughout the head without intravenous contrast. Sagittal and coronal reconstructions were performed.  The CT exam was performed using one or more of the following dose reduction techniques- Automated exposure control, adjustment of the mA and/or kV according to patient size,  and/or use of iterative reconstructed technique.    COMPARISON:  CT head June 13, 2022.  MRI brain Kecia 15, 2022.    FINDINGS:  Intracranial compartment:    Ventricles and sulci are normal in size for age without evidence of hydrocephalus. No extra-axial blood or fluid collections.    The brain parenchyma appears normal. No parenchymal mass, hemorrhage, edema or major vascular distribution infarct.  Cerebellar tonsil ectopia.    Skull/extracranial contents (limited evaluation): No fracture. Mastoid air cells and paranasal sinuses are essentially clear.                                       X-Ray Chest AP Portable (Final result)  Result time 03/09/23 15:16:40      Final result by JEAN-PAUL Matta Sr., MD (03/09/23 15:16:40)                   Impression:      Normal study.      Electronically signed by: Skinny Matta MD  Date:    03/09/2023  Time:    15:16               Narrative:    EXAMINATION:  XR CHEST AP PORTABLE    CLINICAL HISTORY:  htn;    COMPARISON:  06/13/2022    FINDINGS:  The size of the heart is normal. The lungs are clear. There is no pneumothorax.  The costophrenic angles are sharp.                                         Assessment/Plan:      * Hypertension  Hold Entresto and diuretics due to TANNER   Continue hydralazine, carvedilol  Prn clonidine   Monitor blood pressure      Chronic diastolic congestive heart failure  Patient is identified as having Diastolic (HFpEF) heart failure that is Chronic. CHF is currently controlled. Latest ECHO performed and demonstrates- Results for orders placed during the hospital encounter of 02/15/22    Echo    Interpretation Summary  · The left ventricle is normal in size with concentric hypertrophy and normal systolic function.  · The estimated ejection fraction is 60%.  · Normal left ventricular diastolic function.  · Normal right ventricular size with normal right ventricular systolic function.  · Mild to moderate tricuspid regurgitation.  · Normal central venous  pressure (3 mmHg).  · The estimated PA systolic pressure is 36 mmHg.  . Continue Beta Blocker and monitor clinical status closely. Monitor on telemetry. Patient is off CHF pathway.  Monitor strict Is&Os and daily weights.  Place on fluid restriction of 1.5 L. Continue to stress to patient importance of self efficacy and  on diet for CHF. Last BNP reviewed- and noted below   Recent Labs   Lab 03/09/23  1508   BNP 29   .  Hold Entresto and diuretics  Monitor volume status    TANNER (acute kidney injury)  Patient with acute kidney injury likely due to IVVD/dehydration TANNER is currently worsening. Labs reviewed- Renal function/electrolytes with Estimated Creatinine Clearance: 39.6 mL/min (A) (based on SCr of 2.1 mg/dL (H)). according to latest data. Monitor urine output and serial BMP and adjust therapy as needed. Avoid nephrotoxins and renally dose meds for GFR listed above.   Suspect IVVD as hemoglobin appears concentrated based upon chart reviwe, currently 14 but baseline around 11  Hold diuretics  IVFs  Repeat labs in am    Headache  Likely multifactorial  Prn Fioricet      Malignant neoplasm of upper-outer quadrant of right breast in female, estrogen receptor positive  Followed by oncology   Continue Femara  Imaging ordered for new lump felt by patient  Recommend outpatient evaluation with oncology      NILS (obstructive sleep apnea)  CPAP nightly        VTE Risk Mitigation (From admission, onward)         Ordered     apixaban tablet 5 mg  2 times daily         03/09/23 1624     Reason for No Pharmacological VTE Prophylaxis  Once        Question:  Reasons:  Answer:  Already adequately anticoagulated on oral Anticoagulants    03/09/23 1620     IP VTE HIGH RISK PATIENT  Once         03/09/23 1620     Place sequential compression device  Until discontinued         03/09/23 1620                Discharge Planning   MARTA:      Code Status: Full Code   Is the patient medically ready for discharge?:     Reason for patient  still in hospital (select all that apply): ICU care  Discharge Plan A: Home, Home with family            Critical care time spent on the evaluation and treatment of severe organ dysfunction, review of pertinent labs and imaging studies, discussions with consulting providers and discussions with patient/family: 76 minutes.      Gita Mariano MD  Department of Hospital Medicine   [facility] - Intensive Care ([facility])

## 2023-03-11 NOTE — HOSPITAL COURSE
3/11/23: pt seen and examined this am in ICU.  Off dopamine gtt at time of visit.  HR was normal at bedside. BP high. Has headache.  Seems to have some brief vagally mediated bradycardia at times. Coreg on hold.    3/14/23- Pt seen and examined today, asked re-eval for blood pressure issues. Labs reviewed, BP 180s/100s, HR 60s     3/15/2023--patient seen and examined in ICU. Had episode of ortho stasis earlier this AM. BP dropped in 80s upon standing. Recommend compression stockings to mid thighs during wakening hours. Recommend OP visit with exercise  to discuss autonomic exercises to improve vascular tone.

## 2023-03-11 NOTE — SUBJECTIVE & OBJECTIVE
Interval History:     ROS complete and negative unless stated in the interval HPI    Objective:     Vital Signs (Most Recent):  Temp: 97.7 °F (36.5 °C) (03/11/23 0400)  Pulse: 66 (03/11/23 0739)  Resp: (!) 30 (03/11/23 0739)  BP: (!) 186/91 (03/11/23 0700)  SpO2: 100 % (03/11/23 0739)   Vital Signs (24h Range):  Temp:  [97.6 °F (36.4 °C)-98 °F (36.7 °C)] 97.7 °F (36.5 °C)  Pulse:  [27-86] 66  Resp:  [14-58] 30  SpO2:  [93 %-100 %] 100 %  BP: ()/() 186/91     Weight: 135.7 kg (299 lb 2.6 oz)  Body mass index is 44.18 kg/m².      Intake/Output Summary (Last 24 hours) at 3/11/2023 0943  Last data filed at 3/11/2023 0600  Gross per 24 hour   Intake 2970.49 ml   Output 1800 ml   Net 1170.49 ml     Physical Exam  Vitals reviewed.   Constitutional:       General: She is awake. She is not in acute distress.     Appearance: She is obese.   Cardiovascular:      Rate and Rhythm: Normal rate and regular rhythm.      Pulses:           Radial pulses are 2+ on the right side and 2+ on the left side.      Comments: No edema   Pulmonary:      Effort: Pulmonary effort is normal.      Breath sounds: Normal breath sounds.   Abdominal:      General: There is no distension.      Palpations: Abdomen is soft.      Tenderness: There is no abdominal tenderness.   Musculoskeletal:      Comments: MI   Skin:     General: Skin is warm and dry.   Neurological:      General: No focal deficit present.      Mental Status: She is alert.      Comments: Equal strength throughout    Psychiatric:         Behavior: Behavior is cooperative.      Comments: calm       Vents:  Oxygen Concentration (%): 28 (03/11/23 0739)    Lines/Drains/Airways       Central Venous Catheter Line  Duration             Percutaneous Central Line Insertion/Assessment - Triple Lumen  03/10/23 Internal Jugular Right 1 day              Drain  Duration                  Closed/Suction Drain 03/02/21 1625 Right Breast Bulb 19 Fr. 738 days         Urethral Catheter 03/11/23  0330 Non-latex 16 Fr. <1 day              Peripheral Intravenous Line  Duration                  Peripheral IV - Single Lumen 03/09/23 1600 20 G Anterior;Left Antecubital 1 day                    Significant Labs:    CBC/Anemia Profile:  Recent Labs   Lab 03/09/23  1508 03/10/23  0517 03/11/23  0422   WBC 11.54 10.05 10.96   HGB 14.0 12.3 11.8*   HCT 43.3 40.7 36.8*    203 241   MCV 77* 82 78*   RDW 17.0* 17.6* 16.6*        Chemistries:  Recent Labs   Lab 03/09/23  1508 03/10/23  0517 03/10/23  1526 03/11/23  0422    138 138 139   K 4.3 4.1 4.1 4.0    109 107 108   CO2 21* 16* 18* 21*   BUN 29* 31* 31* 28*   CREATININE 2.1* 1.6* 1.7* 1.4   CALCIUM 10.2 9.0 9.1 9.2   ALBUMIN 3.7 3.1*  --  3.1*  3.1*   PROT 8.2 6.6  --  6.8  6.8   BILITOT 0.4 0.3  --  0.2  0.2   ALKPHOS 131 106  --  108  108   ALT 12 10  --  12  12   AST 13 10  --  12  12   MG  --   --  1.9 1.8   PHOS  --   --  4.2  --        All pertinent labs within the past 24 hours have been reviewed.    Significant Imaging:  I have reviewed all pertinent imaging results/findings within the past 24 hours.

## 2023-03-11 NOTE — ASSESSMENT & PLAN NOTE
- admitted 3/9 for SBP in the 200s  - hold hypertensive meds at this time and allow for SBP to run up to 220 mmHg until neuro imaging obtained to eval for ischemic stroke   [FreeTextEntry1] : Bilateral Cerumen Removal  [FreeTextEntry2] : Bilateral Cerumen Impaction [FreeTextEntry3] : After informed verbal consent is obtained, binocular microscopy is used to remove cerumen from the right ear canal with a curette and suction.\par \par After informed verbal consent is obtained, binocular microscopy is used to remove cerumen from the left ear canal with a curette and suction.\par

## 2023-03-11 NOTE — PROGRESS NOTES
[facility] ([facility])  Cardiology  Progress Note    Patient Name: Susu Luther  MRN: [id number]  Admission Date: 3/9/2023  Hospital Length of Stay: 0 days  Code Status: Full Code   Attending Physician: Jarad Yousif MD   Primary Care Physician: Peace Arias MD  Expected Discharge Date:   Principal Problem:Hypertension    Subjective:     HPI:  Ms. Luther is a 72y/o AA female with PMHx of diastolic HF, CVA, HLD, breast CA, hx of recurrent PE, AAA s/p repair, HTN, anxiety,  TANNER, NILS, obesity who presented to [facility] ED c/o dizziness and elevated home BP readings, she also felt like she was going to pass out. Cardiology consulted for symptomatic bradycardia. Pt seen and examined today. She went to her PCP prior and had a near syncopal episode. Today she had a possible bradycardia episode, tele monitor room reported 47 lowest rate. Labs reviewed, Crt 1.6, K 4.1, cardiac enzymes negative. CT head negative for any acute changes, Echo Jan 23' EF nml, Nuclear stress Jan 23' negative    Hospital Course:   3/11/23: pt seen and examined this am in ICU.  Off dopamine gtt at time of visit.  HR was normal at bedside. BP high. Has headache.  Seems to have some brief vagally mediated bradycardia at times. Coreg on hold.          Review of Systems   Constitutional: Positive for malaise/fatigue.   HENT: Negative.     Eyes: Negative.    Cardiovascular: Negative.    Respiratory: Negative.     Endocrine: Negative.    Hematologic/Lymphatic: Negative.    Skin: Negative.    Musculoskeletal: Negative.    Gastrointestinal: Negative.    Genitourinary: Negative.    Neurological:  Positive for headaches and weakness.   Psychiatric/Behavioral: Negative.     Allergic/Immunologic: Negative.    Objective:     Vital Signs (Most Recent):  Temp: 97.7 °F (36.5 °C) (03/11/23 0400)  Pulse: 66 (03/11/23 0739)  Resp: (!) 30 (03/11/23 0739)  BP: (!) 186/91 (03/11/23 0700)  SpO2: 100 % (03/11/23 0739)   Vital Signs (24h  Range):  Temp:  [97.7 °F (36.5 °C)-98 °F (36.7 °C)] 97.7 °F (36.5 °C)  Pulse:  [27-86] 66  Resp:  [14-58] 30  SpO2:  [94 %-100 %] 100 %  BP: ()/() 186/91     Weight: 135.7 kg (299 lb 2.6 oz)  Body mass index is 44.18 kg/m².     SpO2: 100 %         Intake/Output Summary (Last 24 hours) at 3/11/2023 1146  Last data filed at 3/11/2023 0600  Gross per 24 hour   Intake 2970.49 ml   Output 1800 ml   Net 1170.49 ml       Lines/Drains/Airways       Central Venous Catheter Line  Duration             Percutaneous Central Line Insertion/Assessment - Triple Lumen  03/10/23 Internal Jugular Right 1 day              Drain  Duration                  Closed/Suction Drain 03/02/21 1625 Right Breast Bulb 19 Fr. 738 days         Urethral Catheter 03/11/23 0330 Non-latex 16 Fr. <1 day              Peripheral Intravenous Line  Duration                  Peripheral IV - Single Lumen 03/09/23 1600 20 G Anterior;Left Antecubital 1 day                    Physical Exam  Vitals and nursing note reviewed.   Constitutional:       General: She is not in acute distress.     Appearance: Normal appearance. She is well-developed. She is not ill-appearing or diaphoretic.   HENT:      Head: Normocephalic.   Neck:      Thyroid: No thyromegaly.      Vascular: No carotid bruit or JVD.   Cardiovascular:      Rate and Rhythm: Normal rate and regular rhythm.      Pulses: Normal pulses.           Radial pulses are 2+ on the right side and 2+ on the left side.      Heart sounds: Normal heart sounds, S1 normal and S2 normal. No murmur heard.    No friction rub. No gallop.   Pulmonary:      Effort: Pulmonary effort is normal.      Breath sounds: Normal breath sounds. No wheezing or rales.   Abdominal:      General: Bowel sounds are decreased. There is no abdominal bruit.      Palpations: Abdomen is soft.      Tenderness: There is no abdominal tenderness.   Musculoskeletal:      Cervical back: Neck supple.   Lymphadenopathy:      Cervical: No cervical  adenopathy.   Skin:     General: Skin is warm.   Neurological:      Mental Status: She is alert and oriented to person, place, and time.   Psychiatric:         Behavior: Behavior normal. Behavior is cooperative.       Significant Labs: ABG: No results for input(s): PH, PCO2, HCO3, POCSATURATED, BE in the last 48 hours., Blood Culture: No results for input(s): LABBLOO in the last 48 hours., BMP:   Recent Labs   Lab 03/10/23  0517 03/10/23  1526 03/11/23 0422    129* 111*    138 139   K 4.1 4.1 4.0    107 108   CO2 16* 18* 21*   BUN 31* 31* 28*   CREATININE 1.6* 1.7* 1.4   CALCIUM 9.0 9.1 9.2   MG  --  1.9 1.8   , CMP   Recent Labs   Lab 03/09/23  1508 03/10/23  0517 03/10/23  1526 03/11/23 0422    138 138 139   K 4.3 4.1 4.1 4.0    109 107 108   CO2 21* 16* 18* 21*   * 100 129* 111*   BUN 29* 31* 31* 28*   CREATININE 2.1* 1.6* 1.7* 1.4   CALCIUM 10.2 9.0 9.1 9.2   PROT 8.2 6.6  --  6.8  6.8   ALBUMIN 3.7 3.1*  --  3.1*  3.1*   BILITOT 0.4 0.3  --  0.2  0.2   ALKPHOS 131 106  --  108  108   AST 13 10  --  12  12   ALT 12 10  --  12  12   ANIONGAP 13 13 13 10   , CBC   Recent Labs   Lab 03/09/23  1508 03/10/23  0517 03/11/23 0422   WBC 11.54 10.05 10.96   HGB 14.0 12.3 11.8*   HCT 43.3 40.7 36.8*    203 241   , INR   Recent Labs   Lab 03/11/23 0422   INR 1.1   , Lipid Panel No results for input(s): CHOL, HDL, LDLCALC, TRIG, CHOLHDL in the last 48 hours., and Troponin   Recent Labs   Lab 03/09/23  1508 03/10/23  1526   TROPONINI 0.021 0.015       Significant Imaging: Echocardiogram: Transthoracic echo (TTE) complete ([facility] Only):   Results for orders placed or performed during the hospital encounter of 03/09/23   Echo   Result Value Ref Range    BSA 2.58 m2    TDI SEPTAL 0.07 m/s    LV LATERAL E/E' RATIO 5.89 m/s    LV SEPTAL E/E' RATIO 7.57 m/s    LA WIDTH 4.20 cm    IVC diameter 1.5 cm    Left Ventricular Outflow Tract Mean Velocity 1.02 cm/s    Left Ventricular  Outflow Tract Mean Gradient 4.30 mmHg    TV mean gradient 19 mmHg    TDI LATERAL 0.09 m/s    LVIDd 4.41 3.5 - 6.0 cm    IVS 1.60 (A) 0.6 - 1.1 cm    Posterior Wall 1.49 (A) 0.6 - 1.1 cm    Ao root annulus 3.15 cm    LVIDs 2.57 2.1 - 4.0 cm    FS 42 28 - 44 %    LA volume 67.89 cm3    STJ 2.94 cm    Ascending aorta 2.67 cm    LV mass 280.24 g    LA size 3.64 cm    TAPSE 2.00 cm    Left Ventricle Relative Wall Thickness 0.68 cm    AV mean gradient 6 mmHg    AV valve area 2.86 cm2    AV Velocity Ratio 0.79     AV index (prosthetic) 0.91     MV valve area p 1/2 method 2.79 cm2    E/A ratio 0.82     Mean e' 0.08 m/s    E wave deceleration time 272.20 msec    IVRT 76.12 msec    LVOT diameter 2.00 cm    LVOT area 3.1 cm2    LVOT peak henry 1.19 m/s    LVOT peak VTI 28.80 cm    Ao peak henry 1.50 m/s    Ao VTI 31.6 cm    RVOT peak henry 1.04 m/s    RVOT peak VTI 25.1 cm    LVOT stroke volume 90.43 cm3    AV peak gradient 9 mmHg    PV mean gradient 2.75 mmHg    E/E' ratio 6.63 m/s    MV Peak E Henry 0.53 m/s    TR Max Henry 2.60 m/s    MV stenosis pressure 1/2 time 78.94 ms    MV Peak A Henry 0.65 m/s    LV Systolic Volume 23.99 mL    LV Systolic Volume Index 9.8 mL/m2    LV Diastolic Volume 87.94 mL    LV Diastolic Volume Index 35.75 mL/m2    LA Volume Index 27.6 mL/m2    LV Mass Index 114 g/m2    RA Major Axis 3.89 cm    Left Atrium Minor Axis 5.40 cm    Left Atrium Major Axis 5.06 cm    Triscuspid Valve Regurgitation Peak Gradient 27 mmHg    RA Width 3.00 cm    Right Atrial Pressure (from IVC) 3 mmHg    EF 65 %    TV rest pulmonary artery pressure 30 mmHg    Narrative    · The left ventricle is normal in size with concentric hypertrophy and   normal systolic function.  · The estimated ejection fraction is 65%.  · Normal left ventricular diastolic function.  · Normal right ventricular size with normal right ventricular systolic   function.  · Normal central venous pressure (3 mmHg).  · The estimated PA systolic pressure is 30 mmHg.         Assessment and Plan:     TRANSIENT VAGAL MEDIATED BRADYCARDIA.  COREG ON HOLD FOR NOW; THIS MAY BE ABLE TO BE RESTARTED AT LOWER DOSE BEFORE DISCHARGE OR ON F/U IN CARDS CLINIC.  RECOMMEND 1 WEEK VITAL HOLTER AS OUTPT.  CONTROL HTN WITH OTHER NON AV JENA BLOCKING MEDS.  OMT FOR DIASTOLIC CHF.  DIURESE PRN.  MRI BRAIN PENDING.  HAS RECENT NEGATIVE STRESS TEST, NORMAL EF ON ECHO.  WILL BE AVAILABLE AS NEEDED.  F/U W DR. CHUNG, CARDIOLOGY, ASAP AFTER DISCHARGE.       * Hypertension  Hold Coreg for symptomatic bradycardia  PRN Hydralazine, titrate meds as needed    Bradycardia  Possible bradycardia episode, tele room noted 47 lowest HR  Hold A-V jena blocking agents  Transfer to ICU, ok to use dopamine  Assess response    Chronic diastolic congestive heart failure  Echo EF nml  BNP negative  Hold BB, for possible bradycardia episode  Cont medical management      History of pulmonary embolism  Eliquis    TANNER (acute kidney injury)  Management per     Headache  BP control, management per     Morbid obesity with BMI of 45.0-49.9, adult  Weight loss    NILS (obstructive sleep apnea)  CPAP        VTE Risk Mitigation (From admission, onward)         Ordered     apixaban tablet 5 mg  2 times daily         03/09/23 1624     Reason for No Pharmacological VTE Prophylaxis  Once        Question:  Reasons:  Answer:  Already adequately anticoagulated on oral Anticoagulants    03/09/23 1620     IP VTE HIGH RISK PATIENT  Once         03/09/23 1620     Place sequential compression device  Until discontinued         03/09/23 1620                Jon Sorenson MD  Cardiology  [facility] - Intensive Care ([facility])

## 2023-03-11 NOTE — PROGRESS NOTES
Pharmacist Renal Dose Adjustment Note    Susu Luther is a 73 y.o. female being treated with the medication famotidine.     Patient Data:    Vital Signs (Most Recent):  Temp: 97.7 °F (36.5 °C) (03/11/23 0400)  Pulse: 66 (03/11/23 0739)  Resp: (!) 30 (03/11/23 0739)  BP: (!) 186/91 (03/11/23 0700)  SpO2: 100 % (03/11/23 0739)   Vital Signs (72h Range):  Temp:  [97.6 °F (36.4 °C)-98.4 °F (36.9 °C)]   Pulse:  [27-88]   Resp:  [14-58]   BP: ()/()   SpO2:  [93 %-100 %]      Recent Labs   Lab 03/10/23  0517 03/10/23  1526 03/11/23  0422   CREATININE 1.6* 1.7* 1.4     Serum creatinine: 1.4 mg/dL 03/11/23 0422  Estimated creatinine clearance: 54.7 mL/min    Famotidine 20 mg oral every 24 hours will be changed to famotidine 20 mg oral every 12 hours per renal dose protocol for CrCl > 50 ml/min.     Thank you,  Pharmacist's Name: Rogelio Cordero

## 2023-03-11 NOTE — ASSESSMENT & PLAN NOTE
- at time of admit pt with c/o HA and dizziness; on 3/10 pt reports of worsening slurring of words overnight, slight numbness to the R side of body and L side of face, development of N/V/D, periods or bradycardia, ongoing dizziness and HA; 3/11 pt denies neuro symptoms other than HA  - tele neuro consulted  - MRI brain and MRA brain/neck pending follow results   - allow pt's SBP to rise up to 220 mmHg for now  - q1h neuro checks   - PT/OT when able to participate  - zofran PRN for nausea  - on Eliquis as noted elsewhere

## 2023-03-11 NOTE — PLAN OF CARE
Pt remains A&O X 4. Pt on dopamine gtt at fixed rate, drip was decreased due to pt c/o headache and vomiting. Pt unable to void, requested writer straight cath as pt having pain and bladder scan >500, straight cathed per pt request. Pt later in shift was again unable to void and requested atkins be placed as it would have been the 3rd time for need of straight cath. Pt did have 1 vomiting episode, given Zofran and pt had no additional issues. Pt monitored closely throughout shift.

## 2023-03-11 NOTE — PROCEDURES
"Susu Luther is a 73 y.o. female patient.    Temp: 97.8 °F (36.6 °C) (03/10/23 1550)  Pulse: (!) 55 (03/10/23 1450)  Resp: 20 (03/10/23 1450)  BP: (!) 140/87 (03/10/23 1450)  SpO2: 95 % (03/10/23 1450)  Weight: 135.5 kg (298 lb 11.6 oz) (03/10/23 1550)  Height: 5' 9" (175.3 cm) (03/10/23 1010)       Central Line    Date/Time: 3/10/2023 6:22 PM  Performed by: Jarad Yousif MD  Authorized by: Jarad Yousif MD     Location procedure was performed:  [facility] INTENSIVE CARE UNIT  Assisting Provider:  Kei Radford NP  Pre-operative diagnosis:  Hypotension  Post-operative diagnosis:  Hypotension  Consent Done ?:  Yes  Time out complete?: Verified correct patient, procedure, equipment, staff, and site/side    Indications:  Med administration and vascular access  Anesthesia:  Local infiltration  Local anesthetic:  Lidocaine 1% without epinephrine  Anesthetic total (ml):  5  Preparation:  Skin prepped with ChloraPrep  Skin prep agent dried: Skin prep agent completely dried prior to procedure    Sterile barriers: All five maximal sterile barriers used - gloves, gown, cap, mask and large sterile sheet    Location:  Right internal jugular  Catheter type:  Triple lumen  Catheter size:  7 Fr  Inserted Catheter Length (cm):  15  Ultrasound guidance: Yes    Vessel Caliber:  Medium   patent  Comprressibility:  Normal  Needle advanced into vessel with real time ultrasound guidance.    Guidewire confirmed in vessel.    Steril sheath on probe.    Sterile gel used.  Manometry: No    Number of attempts:  1  Securement:  Line sutured, chlorhexidine patch, sterile dressing applied and blood return through all ports  Complications: No    Estimated blood loss (mL):  3  Guidewire comment:  Verified with EUGENE Radford  XRay:  Tip termination, successful placement and no pneumothorax on x-ray  Adverse Events:  NoneTermination Site: superior vena cava    3/10/2023    "

## 2023-03-11 NOTE — ASSESSMENT & PLAN NOTE
- pt reports currently in remission but recently found a new lump and has not had it worked up yet as an outpt  - continue home dose letrozole  - supportive care

## 2023-03-11 NOTE — ASSESSMENT & PLAN NOTE
- new onset 3/10  - cardiology consulted   - hold further BB  - continue telemetry  - holding dopamine this AM to evaluate response

## 2023-03-11 NOTE — PLAN OF CARE
Dopamine infusion initiated.  NS infusing as ordered.  Patient's HR continues to decrease to 20s only during vomiting.  Pt c/o vision changes and decreased sensation to face and extremities, nausea, vomiting, and headache.  PRN antiemetic and analgesic given; pt states some relief.  RIJ TLC and external atkins catheter remains intact.  Brain/neck MRA/MRI to be done.     Problem: Adult Inpatient Plan of Care  Goal: Plan of Care Review  Outcome: Ongoing, Progressing  Goal: Patient-Specific Goal (Individualized)  Outcome: Ongoing, Progressing  Goal: Absence of Hospital-Acquired Illness or Injury  Outcome: Ongoing, Progressing  Goal: Optimal Comfort and Wellbeing  Outcome: Ongoing, Progressing     Problem: Bariatric Environmental Safety  Goal: Safety Maintained with Care  Outcome: Ongoing, Progressing     Problem: Fluid and Electrolyte Imbalance (Acute Kidney Injury/Impairment)  Goal: Fluid and Electrolyte Balance  Outcome: Ongoing, Progressing     Problem: Oral Intake Inadequate (Acute Kidney Injury/Impairment)  Goal: Optimal Nutrition Intake  Outcome: Ongoing, Progressing     Problem: Renal Function Impairment (Acute Kidney Injury/Impairment)  Goal: Effective Renal Function  Outcome: Ongoing, Progressing     Problem: Infection  Goal: Absence of Infection Signs and Symptoms  Outcome: Ongoing, Progressing     Problem: Fall Injury Risk  Goal: Absence of Fall and Fall-Related Injury  Outcome: Ongoing, Progressing      Yes

## 2023-03-11 NOTE — PROGRESS NOTES
"[facility] - Intensive Care ([facility])  Critical Care Medicine  Progress Note    Patient Name: Susu Luther  MRN: [id number]  Admission Date: 3/9/2023  Hospital Length of Stay: 0 days  Code Status: Full Code  Attending Provider: Jarad Yousif MD  Primary Care Provider: Peace Arias MD   Principal Problem: Hypertension    Subjective:     HPI:  73 year old female with a known past medical history of right breast cancer that is currently in remission, hypertension, anxiety, AAA s/p repair, CVA, diastolic heart failure, TANNER, sleep apnea, PE on Eliquis, and obesity who presented to the ED 3/9 due to elevated blood pressure and dizziness. She reports her systolic blood pressure has been in the 200s at home. She has a headache and is dizzy. She went to her PCP 3/9 to be evaluated and had a near syncopal episode. Patient is anxious and tearful when discussing her blood pressure. She is concerned about a lump she found in upper portion of her right breast. She states she forgot to mention it to Dr. Arias. Discussed ordering outpatient US as breast US are not usually done while patients are in the hospital. She verbalized understanding. Hospital medicine consulted. Patient noted to have TANNER. Patient placed in observation.    During the day of 3/10, pt was noted to have worsening slurring and stuttering while speaking that has been worsening since last night. CT head obtained that was without acute findings. Neurology was consulted for evaluation. Later in the day with with development of nausea, vomiting, diarrhea, and episodes of bradycardia. Pt also s/o blurry vision and the "room is spinning". Pt was on coreg for BP control that has since been held. Pt moved to the ICU on the afternoon of 3/10 for closer monitoring and plans for dopamine gtt for HR and BP support. Pt has also been seen per cards this afternoon in consult.      Hospital/ICU Course:  3/11: pt with improved HR and BP overnight but with HA, dopamine " decreased to 2.5 mcg/kg/min, MRI/MRA brain pending, pt denies any decreased sensation or weakness or vision issues or vertigo or other issues this AM, discussed plan of care with pt and nurse      Interval History:     ROS complete and negative unless stated in the interval HPI    Objective:     Vital Signs (Most Recent):  Temp: 97.7 °F (36.5 °C) (03/11/23 0400)  Pulse: 66 (03/11/23 0739)  Resp: (!) 30 (03/11/23 0739)  BP: (!) 186/91 (03/11/23 0700)  SpO2: 100 % (03/11/23 0739)   Vital Signs (24h Range):  Temp:  [97.6 °F (36.4 °C)-98 °F (36.7 °C)] 97.7 °F (36.5 °C)  Pulse:  [27-86] 66  Resp:  [14-58] 30  SpO2:  [93 %-100 %] 100 %  BP: ()/() 186/91     Weight: 135.7 kg (299 lb 2.6 oz)  Body mass index is 44.18 kg/m².      Intake/Output Summary (Last 24 hours) at 3/11/2023 0943  Last data filed at 3/11/2023 0600  Gross per 24 hour   Intake 2970.49 ml   Output 1800 ml   Net 1170.49 ml     Physical Exam  Vitals reviewed.   Constitutional:       General: She is awake. She is not in acute distress.     Appearance: She is obese.   Cardiovascular:      Rate and Rhythm: Normal rate and regular rhythm.      Pulses:           Radial pulses are 2+ on the right side and 2+ on the left side.      Comments: No edema   Pulmonary:      Effort: Pulmonary effort is normal.      Breath sounds: Normal breath sounds.   Abdominal:      General: There is no distension.      Palpations: Abdomen is soft.      Tenderness: There is no abdominal tenderness.   Musculoskeletal:      Comments: MI   Skin:     General: Skin is warm and dry.   Neurological:      General: No focal deficit present.      Mental Status: She is alert.      Comments: Equal strength throughout    Psychiatric:         Behavior: Behavior is cooperative.      Comments: calm       Vents:  Oxygen Concentration (%): 28 (03/11/23 0739)    Lines/Drains/Airways       Central Venous Catheter Line  Duration             Percutaneous Central Line Insertion/Assessment -  Triple Lumen  03/10/23 Internal Jugular Right 1 day              Drain  Duration                  Closed/Suction Drain 03/02/21 1625 Right Breast Bulb 19 Fr. 738 days         Urethral Catheter 03/11/23 0330 Non-latex 16 Fr. <1 day              Peripheral Intravenous Line  Duration                  Peripheral IV - Single Lumen 03/09/23 1600 20 G Anterior;Left Antecubital 1 day                    Significant Labs:    CBC/Anemia Profile:  Recent Labs   Lab 03/09/23  1508 03/10/23  0517 03/11/23  0422   WBC 11.54 10.05 10.96   HGB 14.0 12.3 11.8*   HCT 43.3 40.7 36.8*    203 241   MCV 77* 82 78*   RDW 17.0* 17.6* 16.6*        Chemistries:  Recent Labs   Lab 03/09/23  1508 03/10/23  0517 03/10/23  1526 03/11/23  0422    138 138 139   K 4.3 4.1 4.1 4.0    109 107 108   CO2 21* 16* 18* 21*   BUN 29* 31* 31* 28*   CREATININE 2.1* 1.6* 1.7* 1.4   CALCIUM 10.2 9.0 9.1 9.2   ALBUMIN 3.7 3.1*  --  3.1*  3.1*   PROT 8.2 6.6  --  6.8  6.8   BILITOT 0.4 0.3  --  0.2  0.2   ALKPHOS 131 106  --  108  108   ALT 12 10  --  12  12   AST 13 10  --  12  12   MG  --   --  1.9 1.8   PHOS  --   --  4.2  --        All pertinent labs within the past 24 hours have been reviewed.    Significant Imaging:  I have reviewed all pertinent imaging results/findings within the past 24 hours.      ABG  No results for input(s): PH, PO2, PCO2, HCO3, BE in the last 168 hours.  Assessment/Plan:     Neuro  Neurological complaint  - at time of admit pt with c/o HA and dizziness; on 3/10 pt reports of worsening slurring of words overnight, slight numbness to the R side of body and L side of face, development of N/V/D, periods or bradycardia, ongoing dizziness and HA; 3/11 pt denies neuro symptoms other than HA  - tele neuro consulted  - MRI brain and MRA brain/neck pending follow results   - allow pt's SBP to rise up to 220 mmHg for now  - q1h neuro checks   - PT/OT when able to participate  - zofran PRN for nausea  - on Eliquis as  noted elsewhere     Cardiac/Vascular  * Hypertension  - admitted 3/9 for SBP in the 200s  - hold hypertensive meds at this time and allow for SBP to run up to 220 mmHg until neuro imaging obtained to eval for ischemic stroke    Bradycardia  - new onset 3/10  - cardiology consulted   - hold further BB  - continue telemetry  - holding dopamine this AM to evaluate response     Chronic diastolic congestive heart failure  - echo reviewed and within normal range    Renal/  TANNER (acute kidney injury)  - creatinine improving, monitor  - renally dose meds and avoid nephrotoxic agents as able  - follow lytes, RFP, and UOP    Hematology  History of pulmonary embolism  - continue home Eliquis    Oncology  Malignant neoplasm of upper-outer quadrant of right breast in female, estrogen receptor positive  - pt reports currently in remission but recently found a new lump and has not had it worked up yet as an outpt  - continue home dose letrozole  - supportive care    Endocrine  Morbid obesity with BMI of 45.0-49.9, adult  - affecting medical decision making and complicating the above   - weight loss and lifestyle modifications would be beneficial     Other  NILS (obstructive sleep apnea)  - NIPPV qHS and PRN naps when pt is no longer nauseated   - affecting medical decision making and complicating the above   - weight loss and lifestyle modifications would be beneficial       Prophylaxis Measures:  GI ppx: Famotidine  VTE ppx: Apixaban  Glucose control: Monitor blood glucose    Code Status: Full Code    Critical Care Time: 35 minutes  Critical secondary to Patient is currently on drug therapy requiring intensive monitoring for toxicity: dopamine     Critical care was time spent personally by me on the following activities: development of treatment plan with patient or surrogate and bedside caregivers, discussions with consultants, evaluation of patient's response to treatment, examination of patient, ordering and performing  treatments and interventions, ordering and review of laboratory studies, ordering and review of radiographic studies, pulse oximetry, re-evaluation of patient's condition. This critical care time did not overlap with that of any other provider or involve time for any procedures.     Kei Radford NP  Critical Care Medicine  [facility] - Intensive Care ([facility])

## 2023-03-12 PROBLEM — R42 DIZZINESS: Status: ACTIVE | Noted: 2023-03-12

## 2023-03-12 LAB
ANION GAP SERPL CALC-SCNC: 14 MMOL/L (ref 8–16)
BASOPHILS # BLD AUTO: 0.06 K/UL (ref 0–0.2)
BASOPHILS NFR BLD: 0.6 % (ref 0–1.9)
BUN SERPL-MCNC: 13 MG/DL (ref 8–23)
CALCIUM SERPL-MCNC: 9.3 MG/DL (ref 8.7–10.5)
CHLORIDE SERPL-SCNC: 110 MMOL/L (ref 95–110)
CO2 SERPL-SCNC: 19 MMOL/L (ref 23–29)
CREAT SERPL-MCNC: 0.9 MG/DL (ref 0.5–1.4)
DIFFERENTIAL METHOD: ABNORMAL
EOSINOPHIL # BLD AUTO: 0.1 K/UL (ref 0–0.5)
EOSINOPHIL NFR BLD: 1.3 % (ref 0–8)
ERYTHROCYTE [DISTWIDTH] IN BLOOD BY AUTOMATED COUNT: 17.2 % (ref 11.5–14.5)
EST. GFR  (NO RACE VARIABLE): >60 ML/MIN/1.73 M^2
GLUCOSE SERPL-MCNC: 110 MG/DL (ref 70–110)
HCT VFR BLD AUTO: 38.7 % (ref 37–48.5)
HGB BLD-MCNC: 12.2 G/DL (ref 12–16)
IMM GRANULOCYTES # BLD AUTO: 0.06 K/UL (ref 0–0.04)
IMM GRANULOCYTES NFR BLD AUTO: 0.6 % (ref 0–0.5)
LYMPHOCYTES # BLD AUTO: 2.6 K/UL (ref 1–4.8)
LYMPHOCYTES NFR BLD: 27.3 % (ref 18–48)
MAGNESIUM SERPL-MCNC: 1.9 MG/DL (ref 1.6–2.6)
MCH RBC QN AUTO: 24.7 PG (ref 27–31)
MCHC RBC AUTO-ENTMCNC: 31.5 G/DL (ref 32–36)
MCV RBC AUTO: 78 FL (ref 82–98)
MONOCYTES # BLD AUTO: 0.9 K/UL (ref 0.3–1)
MONOCYTES NFR BLD: 9.2 % (ref 4–15)
NEUTROPHILS # BLD AUTO: 5.8 K/UL (ref 1.8–7.7)
NEUTROPHILS NFR BLD: 61 % (ref 38–73)
NRBC BLD-RTO: 0 /100 WBC
PLATELET # BLD AUTO: 246 K/UL (ref 150–450)
PMV BLD AUTO: 9.6 FL (ref 9.2–12.9)
POCT GLUCOSE: 123 MG/DL (ref 70–110)
POCT GLUCOSE: 124 MG/DL (ref 70–110)
POTASSIUM SERPL-SCNC: 4.8 MMOL/L (ref 3.5–5.1)
RBC # BLD AUTO: 4.94 M/UL (ref 4–5.4)
SODIUM SERPL-SCNC: 143 MMOL/L (ref 136–145)
WBC # BLD AUTO: 9.45 K/UL (ref 3.9–12.7)

## 2023-03-12 PROCEDURE — 80048 BASIC METABOLIC PNL TOTAL CA: CPT | Mod: HCNC | Performed by: NURSE PRACTITIONER

## 2023-03-12 PROCEDURE — 63600175 PHARM REV CODE 636 W HCPCS: Mod: HCNC | Performed by: INTERNAL MEDICINE

## 2023-03-12 PROCEDURE — 27000221 HC OXYGEN, UP TO 24 HOURS: Mod: HCNC

## 2023-03-12 PROCEDURE — 25000003 PHARM REV CODE 250: Mod: HCNC | Performed by: NURSE PRACTITIONER

## 2023-03-12 PROCEDURE — 83735 ASSAY OF MAGNESIUM: CPT | Mod: HCNC | Performed by: NURSE PRACTITIONER

## 2023-03-12 PROCEDURE — 94761 N-INVAS EAR/PLS OXIMETRY MLT: CPT | Mod: HCNC

## 2023-03-12 PROCEDURE — 25000003 PHARM REV CODE 250: Mod: HCNC | Performed by: INTERNAL MEDICINE

## 2023-03-12 PROCEDURE — 25000003 PHARM REV CODE 250: Mod: HCNC | Performed by: STUDENT IN AN ORGANIZED HEALTH CARE EDUCATION/TRAINING PROGRAM

## 2023-03-12 PROCEDURE — 85025 COMPLETE CBC W/AUTO DIFF WBC: CPT | Mod: HCNC | Performed by: NURSE PRACTITIONER

## 2023-03-12 PROCEDURE — 63600175 PHARM REV CODE 636 W HCPCS: Mod: HCNC | Performed by: STUDENT IN AN ORGANIZED HEALTH CARE EDUCATION/TRAINING PROGRAM

## 2023-03-12 PROCEDURE — 36415 COLL VENOUS BLD VENIPUNCTURE: CPT | Mod: HCNC | Performed by: NURSE PRACTITIONER

## 2023-03-12 PROCEDURE — 99900035 HC TECH TIME PER 15 MIN (STAT): Mod: HCNC

## 2023-03-12 PROCEDURE — 21400001 HC TELEMETRY ROOM: Mod: HCNC

## 2023-03-12 RX ORDER — POLYETHYLENE GLYCOL 3350 17 G/17G
17 POWDER, FOR SOLUTION ORAL DAILY
Status: DISPENSED | OUTPATIENT
Start: 2023-03-12 | End: 2023-03-15

## 2023-03-12 RX ORDER — FUROSEMIDE 20 MG/1
20 TABLET ORAL 2 TIMES DAILY
Status: DISCONTINUED | OUTPATIENT
Start: 2023-03-12 | End: 2023-03-15 | Stop reason: HOSPADM

## 2023-03-12 RX ORDER — HYDRALAZINE HYDROCHLORIDE 20 MG/ML
10 INJECTION INTRAMUSCULAR; INTRAVENOUS ONCE
Status: COMPLETED | OUTPATIENT
Start: 2023-03-12 | End: 2023-03-12

## 2023-03-12 RX ADMIN — APIXABAN 5 MG: 2.5 TABLET, FILM COATED ORAL at 08:03

## 2023-03-12 RX ADMIN — RANOLAZINE 1000 MG: 500 TABLET, EXTENDED RELEASE ORAL at 09:03

## 2023-03-12 RX ADMIN — AMLODIPINE BESYLATE 5 MG: 5 TABLET ORAL at 08:03

## 2023-03-12 RX ADMIN — LETROZOLE 2.5 MG: 2.5 TABLET, FILM COATED ORAL at 08:03

## 2023-03-12 RX ADMIN — HYDRALAZINE HYDROCHLORIDE 10 MG: 20 INJECTION, SOLUTION INTRAMUSCULAR; INTRAVENOUS at 05:03

## 2023-03-12 RX ADMIN — RANOLAZINE 1000 MG: 500 TABLET, EXTENDED RELEASE ORAL at 08:03

## 2023-03-12 RX ADMIN — CLONIDINE HYDROCHLORIDE 0.1 MG: 0.1 TABLET ORAL at 04:03

## 2023-03-12 RX ADMIN — ALPRAZOLAM 0.5 MG: 0.5 TABLET ORAL at 09:03

## 2023-03-12 RX ADMIN — APIXABAN 5 MG: 2.5 TABLET, FILM COATED ORAL at 09:03

## 2023-03-12 RX ADMIN — SACUBITRIL AND VALSARTAN 1 TABLET: 24; 26 TABLET, FILM COATED ORAL at 09:03

## 2023-03-12 RX ADMIN — FAMOTIDINE 20 MG: 20 TABLET ORAL at 09:03

## 2023-03-12 RX ADMIN — POLYETHYLENE GLYCOL 3350 17 G: 17 POWDER, FOR SOLUTION ORAL at 02:03

## 2023-03-12 RX ADMIN — HYDRALAZINE HYDROCHLORIDE 10 MG: 20 INJECTION, SOLUTION INTRAMUSCULAR; INTRAVENOUS at 01:03

## 2023-03-12 RX ADMIN — MUPIROCIN: 20 OINTMENT TOPICAL at 09:03

## 2023-03-12 RX ADMIN — FUROSEMIDE 20 MG: 20 TABLET ORAL at 05:03

## 2023-03-12 RX ADMIN — MUPIROCIN: 20 OINTMENT TOPICAL at 08:03

## 2023-03-12 RX ADMIN — CHLORHEXIDINE GLUCONATE 0.12% ORAL RINSE 15 ML: 1.2 LIQUID ORAL at 08:03

## 2023-03-12 RX ADMIN — FAMOTIDINE 20 MG: 20 TABLET ORAL at 08:03

## 2023-03-12 RX ADMIN — OXYBUTYNIN CHLORIDE 5 MG: 5 TABLET ORAL at 08:03

## 2023-03-12 RX ADMIN — CHLORHEXIDINE GLUCONATE 0.12% ORAL RINSE 15 ML: 1.2 LIQUID ORAL at 09:03

## 2023-03-12 RX ADMIN — BUTALBITAL, ACETAMINOPHEN AND CAFFEINE 1 TABLET: 50; 325; 40 TABLET ORAL at 06:03

## 2023-03-12 RX ADMIN — SACUBITRIL AND VALSARTAN 1 TABLET: 24; 26 TABLET, FILM COATED ORAL at 02:03

## 2023-03-12 RX ADMIN — ZOLPIDEM TARTRATE 5 MG: 5 TABLET ORAL at 10:03

## 2023-03-12 NOTE — PLAN OF CARE
Pt is AAOx4, VSS on room air. Normal sinus rhythm on monitor. UOP 100ml/hr. SBP <180, maintained with PRN medications. Pt turns self independently in bed. POC reviewed with pt and spouse at bedside. Bed is locked in lowest position, bed alarm set, side rails up x2, pt instructed to call staff for assistance.   Temp:  [98.1 °F (36.7 °C)]   Pulse:  [62-96]   Resp:  [12-62]   BP: (147-208)/()   SpO2:  [83 %-100 %]      Problem: Adult Inpatient Plan of Care  Goal: Plan of Care Review  Outcome: Ongoing, Progressing  Goal: Patient-Specific Goal (Individualized)  Outcome: Ongoing, Progressing  Goal: Absence of Hospital-Acquired Illness or Injury  Outcome: Ongoing, Progressing  Goal: Optimal Comfort and Wellbeing  Outcome: Ongoing, Progressing  Goal: Readiness for Transition of Care  Outcome: Ongoing, Progressing     Problem: Bariatric Environmental Safety  Goal: Safety Maintained with Care  Outcome: Ongoing, Progressing     Problem: Fluid and Electrolyte Imbalance (Acute Kidney Injury/Impairment)  Goal: Fluid and Electrolyte Balance  Outcome: Ongoing, Progressing     Problem: Oral Intake Inadequate (Acute Kidney Injury/Impairment)  Goal: Optimal Nutrition Intake  Outcome: Ongoing, Progressing     Problem: Renal Function Impairment (Acute Kidney Injury/Impairment)  Goal: Effective Renal Function  Outcome: Ongoing, Progressing     Problem: Infection  Goal: Absence of Infection Signs and Symptoms  Outcome: Ongoing, Progressing     Problem: Fall Injury Risk  Goal: Absence of Fall and Fall-Related Injury  Outcome: Ongoing, Progressing

## 2023-03-12 NOTE — NURSING
Pt a&ox4, c/o intermittent dizziness and ha. Nausea controlled with PRN zofran. No vomiting today. Dopamine turned off this am, tolerated well. Went down to MRI today, tolerated well. Voiding per atkins. Central line removed. POC reviewed with patient.

## 2023-03-12 NOTE — PROGRESS NOTES
[facility] - Intensive Care ([facility])  [facility] Medicine  Progress Note    Patient Name: Susu Luther  MRN: [id number]  Patient Class: IP- Inpatient   Admission Date: 3/9/2023  Length of Stay: 1 days  Attending Physician: Gita Mariano, *  Primary Care Provider: Peace Arias MD        Subjective:     Principal Problem:Hypertension        HPI:  The patient is 72 yo female with past medical history of right breast cancer, hypertension, anxiety, AAA s/p repair, CVA, diastolic heart failure, TANNER, sleep apnea, and obesity who presented to the ED due to elevated blood pressure and dizziness. She reports her systolic blood pressure has been in the 200s at home. She has a headache and is dizzy. She went to her PCP to be evaluated and had  near syncopal episode. Patient is anxious and tearful when discussing her blood pressure. She is concerned about a lump she found in upper portion of her right breast. She states she forgot to mention it to Dr. Arias. Discussed ordering outpatient US as breast US are not usually done while patients are in the hospital. She verbalized understanding. Hospital medicine consulted. Patient noted to have TANNER. Patient placed in observation.      Overview/Hospital Course:      73 year old female with a known past medical history of right breast cancer that is currently in remission, hypertension, anxiety, AAA s/p repair, CVA, diastolic heart failure, TANNER, sleep apnea, PE on Eliquis, and obesity who presented to the ED 3/9 due to elevated blood pressure, headache and dizziness. She reports her systolic blood pressure has been in the 200s at home.  She went to her PCP 3/9 to be evaluated and had a near syncopal episode.  Hospital medicine consulted and placed in observation.  On arrival CT head -ve for acute intracranial abn;      On 3/10, pt was noted to have worsening slurring and stuttering while speaking that has been worsening since overnight; stat repeat CT head obtained that was  "without acute findings. Vascular Neurology was unable to complete assessment due to patient's acute change in condition.. Later in the day patient developed nausea, vomiting, diarrhea, and episodes of bradycardia with heart rate in 30s. Pt also s/o blurry vision and the "room is spinning". Pt was on coreg for BP control that has since been held.  Cardiology was consulted.  Pt moved to the ICU on the afternoon of 3/10 for closer monitoring and has been started on dopamine gtt for HR and BP support.   TSH, lactic acid, electrolytes within normal limits.    On 03/11, noted to have improvement in heart rate, blood pressure, dopamine decreased to 2.5 mics per kg per minute, eventually off of dopamine, heart rate, blood pressure is stayed stable.  Cardiology recommended to continue to hold Coreg;   Patient was deemed stable for downgrade ICU team, hospital medicine consulted to assume care.    MRI brain, MRA head and neck did not show acute findings/stenosis/occlusion.  During stay in ICU, initially blood pressure was maintained high until MRI MRA was done to rule out stroke.  Subsequently after imaging, amlodipine was added, creatinine improved, Entresto added.    Patient stated that she develops nausea with severe headache while on clonidine, hence not on it at home prior to admission.    Given persistent headache, we will hold on hydralazine.  Coreg held due to bradycardia;   Stated having history of headaches over past 6 months, follows with primary care who has recommended outpatient neurologist, stated having upcoming appointment.    Stated that she takes butalbital/acetaminophen/caffeine p.r.n. for headache.  Denied any diagnosis of migraine.    Has been evaluated by Neurology on 03/12, recommended to control ortho stats, correct electrolytes as needed, PT OT, delirium/fall precautions.  TANNER on arrival--> resolved;        She is concerned about a lump she found in upper portion of her right breast. She states she " forgot to mention it to Dr. Arias- recommend OP work up;           Interval History:     Stated no acute issues overnight, denied nausea, vomiting overnight, stated improvement in blurry vision in left eye;  Stated having intermittent sharp pains in left eye;    Wears glasses at home, has upcoming appointment with ophthalmologist.    During hospital stay, not wearing glasses, which could contribute to headaches/vision changes.    Emphasized to be compliance with wearing glasses, maintain hydration, increase p.o. diet;    C/o constipation- ordered stool softners;     Currently on amlodipine, blood pressure is still noted to be high, added Entresto, creatinine stable.  With bilateral lower extremity swelling, resume home Lasix.    delirium/fall precautions.    PT OT ;            Review of Systems    Constitutional:  In mild distress  Respiratory:  Denied shortness of breath  Cardiovascular:  denied chest pain  Neurological:  Positive for sharp pain in left eye; headaches,         Objective:     Vital Signs (Most Recent):  Temp: 98 °F (36.7 °C) (03/12/23 0400)  Pulse: 73 (03/12/23 1315)  Resp: (!) 62 (03/12/23 1315)  BP: (!) 195/102 (03/12/23 1300)  SpO2: (!) 92 % (03/12/23 1315)   Vital Signs (24h Range):  Temp:  [97.5 °F (36.4 °C)-98.2 °F (36.8 °C)] 98 °F (36.7 °C)  Pulse:  [59-92] 73  Resp:  [12-62] 62  SpO2:  [83 %-100 %] 92 %  BP: (115-216)/() 195/102     Weight: 135.7 kg (299 lb 2.6 oz)  Body mass index is 44.18 kg/m².    Intake/Output Summary (Last 24 hours) at 3/12/2023 1341  Last data filed at 3/12/2023 0400  Gross per 24 hour   Intake 750.93 ml   Output 3175 ml   Net -2424.07 ml      Physical Exam    Constitutional:       General: She is awake. She is not in acute distress.     Appearance: She is obese.   Cardiovascular:      Rate and Rhythm: Normal rate and regular rhythm.      Pulses:           Radial pulses are 2+ on the right side and 2+ on the left side.      Pulmonary:      Effort: Pulmonary effort  is normal.      Breath sounds: Normal breath sounds.   Abdominal:      General: There is no distension.      Palpations: Abdomen is soft.      Tenderness: There is no abdominal tenderness.   Musculoskeletal:   General: No swelling.   Skin:     General: Skin is warm and dry.   Neurological:      General: No focal deficit present.      Mental Status: She is alert.      Comments: strength 5/5 in upper and lower extremities; sensation intact;   Psychiatric:         Behavior: Behavior is cooperative.          Significant Labs: All pertinent labs within the past 24 hours have been reviewed.  CBC:   Recent Labs   Lab 03/11/23 0422 03/12/23 0722   WBC 10.96 9.45   HGB 11.8* 12.2   HCT 36.8* 38.7    246     CMP:   Recent Labs   Lab 03/10/23  1526 03/11/23 0422 03/12/23 0722    139 143   K 4.1 4.0 4.8    108 110   CO2 18* 21* 19*   * 111* 110   BUN 31* 28* 13   CREATININE 1.7* 1.4 0.9   CALCIUM 9.1 9.2 9.3   PROT  --  6.8  6.8  --    ALBUMIN  --  3.1*  3.1*  --    BILITOT  --  0.2  0.2  --    ALKPHOS  --  108  108  --    AST  --  12  12  --    ALT  --  12  12  --    ANIONGAP 13 10 14       Significant Imaging:     Imaging Results              US Renal Artery Stenosis Hyperten (xpd) (Final result)  Result time 03/10/23 13:30:29      Final result by Skinny Warren MD (03/10/23 13:30:29)                   Impression:      Technically difficult examination no acute abnormality.  Bilateral chronic medical renal disease findings.  No evidence of renal artery stenosis.      Electronically signed by: Skinny Warren  Date:    03/10/2023  Time:    13:30               Narrative:    EXAMINATION:  Kidneys and renal arteries with Doppler    CLINICAL HISTORY:  Uncontrolled hypertension.    TECHNIQUE:  Grayscale, color, and Doppler ultrasound evaluation of the bilateral kidneys including the renal arteries for renal artery stenosis.  Technically difficult examination secondary to body habitus and  positioning.    COMPARISON:  None    FINDINGS:  The right kidney measures 8.8cm in size and no hydronephrosis.  Segmental resistive indices were noted of 0.66-0.67.  No evidence of parvus tardus is noted within the segmental arteries.  The main renal artery peak systolic velocity is 128 cm/sec.    The left kidney measures 8.9cm in size and demonstrates no hydronephrosis.  Segmental resistive indices were noted of 0.69-0.72.  No evidence of parvus tardus is noted in segmental renal arteries.  The main renal artery velocities range 170 cm/sec.    An aortic velocity is noted of 122 cm/sec.  This creates a right renal artery to aortic ratio of 1.1 and a left renal artery to aortic ratio of 1.4.    The renal veins appear patent bilaterally.                                       CT Head Without Contrast (Final result)  Result time 03/09/23 15:41:54      Final result by Leo Garcia MD (03/09/23 15:41:54)                   Impression:      No acute intracranial finding.    [place] stroke programme early CT score (ASPECTS): 10      Electronically signed by: Leo Garcia  Date:    03/09/2023  Time:    15:41               Narrative:    EXAMINATION:  CT HEAD WITHOUT CONTRAST    CLINICAL HISTORY:  Syncope, recurrent;    TECHNIQUE:  Low dose axial CT images obtained throughout the head without intravenous contrast. Sagittal and coronal reconstructions were performed.  The CT exam was performed using one or more of the following dose reduction techniques- Automated exposure control, adjustment of the mA and/or kV according to patient size, and/or use of iterative reconstructed technique.    COMPARISON:  CT head June 13, 2022.  MRI brain Kecia 15, 2022.    FINDINGS:  Intracranial compartment:    Ventricles and sulci are normal in size for age without evidence of hydrocephalus. No extra-axial blood or fluid collections.    The brain parenchyma appears normal. No parenchymal mass, hemorrhage, edema or major vascular distribution  infarct.  Cerebellar tonsil ectopia.    Skull/extracranial contents (limited evaluation): No fracture. Mastoid air cells and paranasal sinuses are essentially clear.                                       X-Ray Chest AP Portable (Final result)  Result time 03/09/23 15:16:40      Final result by JEAN-PAUL Matta Sr., MD (03/09/23 15:16:40)                   Impression:      Normal study.      Electronically signed by: Skinny Matta MD  Date:    03/09/2023  Time:    15:16               Narrative:    EXAMINATION:  XR CHEST AP PORTABLE    CLINICAL HISTORY:  htn;    COMPARISON:  06/13/2022    FINDINGS:  The size of the heart is normal. The lungs are clear. There is no pneumothorax.  The costophrenic angles are sharp.                                         Assessment/Plan:      * Hypertension  Hold Entresto and diuretics due to TANNER   Continue hydralazine, carvedilol  Monitor blood pressure    During stay in ICU, initially blood pressure was maintained high until MRI MRA was done to rule out stroke.  Subsequently after imaging, amlodipine was added, creatinine improved, Entresto added.    Patient stated that she develops nausea with severe headache while on clonidine, hence not on it at home prior to admission.    Given persistent headache, we will hold on hydralazine.  Coreg held due to bradycardia;         3/12    Currently on amlodipine, blood pressure is still noted to be high, added Entresto, creatinine stable.  With bilateral lower extremity swelling, resume home Lasix.      Bradycardia  Likely due to vagal mediated   Cardiology on board, recommended to hold Coreg/AV jena blocking medications   Medical management for diastolic heart failure   Noted to have negative recent stress test    Recommend 1 week  Holter as outpatient ;  Patient follows with Dr. Hobbs, recommended to follow up outpatient                 Chronic diastolic congestive heart failure  Patient is identified as having Diastolic (HFpEF) heart  failure that is Chronic. CHF is currently controlled. Latest ECHO performed and demonstrates- Results for orders placed during the hospital encounter of 02/15/22    Echo    Interpretation Summary  · The left ventricle is normal in size with concentric hypertrophy and normal systolic function.  · The estimated ejection fraction is 60%.  · Normal left ventricular diastolic function.  · Normal right ventricular size with normal right ventricular systolic function.  · Mild to moderate tricuspid regurgitation.  · Normal central venous pressure (3 mmHg).  · The estimated PA systolic pressure is 36 mmHg.  . Continue Beta Blocker and monitor clinical status closely. Monitor on telemetry. Patient is off CHF pathway.  Monitor strict Is&Os and daily weights.  Place on fluid restriction of 1.5 L. Continue to stress to patient importance of self efficacy and  on diet for CHF. Last BNP reviewed- and noted below   Recent Labs   Lab 03/09/23  1508   BNP 29   .  Hold Entresto and diuretics  Monitor volume status    3/12   With bilateral lower extremity swelling   Creatinine stable   Resume home diuretic  Monitor creatinine    TANNER (acute kidney injury)  Patient with acute kidney injury likely due to IVVD/dehydration TANNER is currently worsening. Labs reviewed- Renal function/electrolytes with Estimated Creatinine Clearance: 85.1 mL/min (based on SCr of 0.9 mg/dL). according to latest data. Monitor urine output and serial BMP and adjust therapy as needed. Avoid nephrotoxins and renally dose meds for GFR listed above.   Suspect IVVD as hemoglobin appears concentrated based upon chart reviwe, currently 14 but baseline around 11  Hold diuretics  IVFs  Repeat labs in am    3/12   Creatinine stable    Headache  Likely multifactorial  Prn Fioricet    Stated having history of headaches over past 6 months, follows with primary care who has recommended outpatient neurologist, stated having upcoming appointment.    Stated that she takes  butalbital/acetaminophen/caffeine p.r.n. for headache.  Denied any diagnosis of migraine.    Has been evaluated by Neurology on 03/12, recommended to control ortho stats, correct electrolytes as needed, PT OT, delirium/fall precautions.  MRI, MRA head and neck negative   Recommended to be compliant with glasses  Blood pressure control   Hydration         Malignant neoplasm of upper-outer quadrant of right breast in female, estrogen receptor positive  Followed by oncology   Continue Femara  Imaging ordered for new lump felt by patient  Recommend outpatient evaluation with oncology      NILS (obstructive sleep apnea)  CPAP nightly        VTE Risk Mitigation (From admission, onward)           Ordered     apixaban tablet 5 mg  2 times daily         03/09/23 1624     Reason for No Pharmacological VTE Prophylaxis  Once        Question:  Reasons:  Answer:  Already adequately anticoagulated on oral Anticoagulants    03/09/23 1620     IP VTE HIGH RISK PATIENT  Once         03/09/23 1620     Place sequential compression device  Until discontinued         03/09/23 1620                    Discharge Planning   MARTA:      Code Status: Full Code   Is the patient medically ready for discharge?:     Reason for patient still in hospital (select all that apply):  Monitor clinical improvement, follow up with Cardiology, Neurology   PT OT  Discharge Plan A: Home, Home with family            Critical care time spent on the evaluation and treatment of severe organ dysfunction, review of pertinent labs and imaging studies, discussions with consulting providers and discussions with patient/family: 84 minutes.      Gita Mariano MD  Department of Hospital Medicine   '[place] - Intensive Care ([facility])

## 2023-03-12 NOTE — ASSESSMENT & PLAN NOTE
Hold Entresto and diuretics due to TANNER   Continue hydralazine, carvedilol  Monitor blood pressure    During stay in ICU, initially blood pressure was maintained high until MRI MRA was done to rule out stroke.  Subsequently after imaging, amlodipine was added, creatinine improved, Entresto added.    Patient stated that she develops nausea with severe headache while on clonidine, hence not on it at home prior to admission.    Given persistent headache, we will hold on hydralazine.  Coreg held due to bradycardia;         3/12    Currently on amlodipine, blood pressure is still noted to be high, added Entresto, creatinine stable.  With bilateral lower extremity swelling, resume home Lasix.

## 2023-03-12 NOTE — SUBJECTIVE & OBJECTIVE
Interval History:     Stated no acute issues overnight, denied nausea, vomiting overnight, stated improvement in blurry vision in left eye;  Stated having intermittent sharp pains in left eye;    Wears glasses at home, has upcoming appointment with ophthalmologist.    During hospital stay, not wearing glasses, which could contribute to headaches/vision changes.    Emphasized to be compliance with wearing glasses, maintain hydration, increase p.o. diet;    C/o constipation- ordered stool softners;     Currently on amlodipine, blood pressure is still noted to be high, added Entresto, creatinine stable.  With bilateral lower extremity swelling, resume home Lasix.    delirium/fall precautions.    PT OT ;            Review of Systems    Constitutional:  In mild distress  Respiratory:  Denied shortness of breath  Cardiovascular:  denied chest pain  Neurological:  Positive for sharp pain in left eye; headaches,         Objective:     Vital Signs (Most Recent):  Temp: 98 °F (36.7 °C) (03/12/23 0400)  Pulse: 73 (03/12/23 1315)  Resp: (!) 62 (03/12/23 1315)  BP: (!) 195/102 (03/12/23 1300)  SpO2: (!) 92 % (03/12/23 1315)   Vital Signs (24h Range):  Temp:  [97.5 °F (36.4 °C)-98.2 °F (36.8 °C)] 98 °F (36.7 °C)  Pulse:  [59-92] 73  Resp:  [12-62] 62  SpO2:  [83 %-100 %] 92 %  BP: (115-216)/() 195/102     Weight: 135.7 kg (299 lb 2.6 oz)  Body mass index is 44.18 kg/m².    Intake/Output Summary (Last 24 hours) at 3/12/2023 1341  Last data filed at 3/12/2023 0400  Gross per 24 hour   Intake 750.93 ml   Output 3175 ml   Net -2424.07 ml      Physical Exam    Constitutional:       General: She is awake. She is not in acute distress.     Appearance: She is obese.   Cardiovascular:      Rate and Rhythm: Normal rate and regular rhythm.      Pulses:           Radial pulses are 2+ on the right side and 2+ on the left side.      Pulmonary:      Effort: Pulmonary effort is normal.      Breath sounds: Normal breath sounds.   Abdominal:       General: There is no distension.      Palpations: Abdomen is soft.      Tenderness: There is no abdominal tenderness.   Musculoskeletal:   General: No swelling.   Skin:     General: Skin is warm and dry.   Neurological:      General: No focal deficit present.      Mental Status: She is alert.      Comments: strength 5/5 in upper and lower extremities; sensation intact;   Psychiatric:         Behavior: Behavior is cooperative.          Significant Labs: All pertinent labs within the past 24 hours have been reviewed.  CBC:   Recent Labs   Lab 03/11/23  0422 03/12/23  0722   WBC 10.96 9.45   HGB 11.8* 12.2   HCT 36.8* 38.7    246     CMP:   Recent Labs   Lab 03/10/23  1526 03/11/23  0422 03/12/23  0722    139 143   K 4.1 4.0 4.8    108 110   CO2 18* 21* 19*   * 111* 110   BUN 31* 28* 13   CREATININE 1.7* 1.4 0.9   CALCIUM 9.1 9.2 9.3   PROT  --  6.8  6.8  --    ALBUMIN  --  3.1*  3.1*  --    BILITOT  --  0.2  0.2  --    ALKPHOS  --  108  108  --    AST  --  12  12  --    ALT  --  12  12  --    ANIONGAP 13 10 14       Significant Imaging:     Imaging Results              US Renal Artery Stenosis Hyperten (xpd) (Final result)  Result time 03/10/23 13:30:29      Final result by Skinny Warren MD (03/10/23 13:30:29)                   Impression:      Technically difficult examination no acute abnormality.  Bilateral chronic medical renal disease findings.  No evidence of renal artery stenosis.      Electronically signed by: Skinny Warren  Date:    03/10/2023  Time:    13:30               Narrative:    EXAMINATION:  Kidneys and renal arteries with Doppler    CLINICAL HISTORY:  Uncontrolled hypertension.    TECHNIQUE:  Grayscale, color, and Doppler ultrasound evaluation of the bilateral kidneys including the renal arteries for renal artery stenosis.  Technically difficult examination secondary to body habitus and positioning.    COMPARISON:  None    FINDINGS:  The right kidney  measures 8.8cm in size and no hydronephrosis.  Segmental resistive indices were noted of 0.66-0.67.  No evidence of parvus tardus is noted within the segmental arteries.  The main renal artery peak systolic velocity is 128 cm/sec.    The left kidney measures 8.9cm in size and demonstrates no hydronephrosis.  Segmental resistive indices were noted of 0.69-0.72.  No evidence of parvus tardus is noted in segmental renal arteries.  The main renal artery velocities range 170 cm/sec.    An aortic velocity is noted of 122 cm/sec.  This creates a right renal artery to aortic ratio of 1.1 and a left renal artery to aortic ratio of 1.4.    The renal veins appear patent bilaterally.                                       CT Head Without Contrast (Final result)  Result time 03/09/23 15:41:54      Final result by Leo Garcia MD (03/09/23 15:41:54)                   Impression:      No acute intracranial finding.    [place] stroke programme early CT score (ASPECTS): 10      Electronically signed by: Leo Garcia  Date:    03/09/2023  Time:    15:41               Narrative:    EXAMINATION:  CT HEAD WITHOUT CONTRAST    CLINICAL HISTORY:  Syncope, recurrent;    TECHNIQUE:  Low dose axial CT images obtained throughout the head without intravenous contrast. Sagittal and coronal reconstructions were performed.  The CT exam was performed using one or more of the following dose reduction techniques- Automated exposure control, adjustment of the mA and/or kV according to patient size, and/or use of iterative reconstructed technique.    COMPARISON:  CT head June 13, 2022.  MRI brain Kecia 15, 2022.    FINDINGS:  Intracranial compartment:    Ventricles and sulci are normal in size for age without evidence of hydrocephalus. No extra-axial blood or fluid collections.    The brain parenchyma appears normal. No parenchymal mass, hemorrhage, edema or major vascular distribution infarct.  Cerebellar tonsil ectopia.    Skull/extracranial  contents (limited evaluation): No fracture. Mastoid air cells and paranasal sinuses are essentially clear.                                       X-Ray Chest AP Portable (Final result)  Result time 03/09/23 15:16:40      Final result by JEAN-PAUL Matta Sr., MD (03/09/23 15:16:40)                   Impression:      Normal study.      Electronically signed by: Skinny Matta MD  Date:    03/09/2023  Time:    15:16               Narrative:    EXAMINATION:  XR CHEST AP PORTABLE    CLINICAL HISTORY:  htn;    COMPARISON:  06/13/2022    FINDINGS:  The size of the heart is normal. The lungs are clear. There is no pneumothorax.  The costophrenic angles are sharp.

## 2023-03-12 NOTE — SUBJECTIVE & OBJECTIVE
Past Medical History:   Diagnosis Date    Chronic diastolic congestive heart failure 2020    Encounter for blood transfusion     History of repair of aneurysm of abdominal aorta using endovascular stent graft     DR Bonds     of psychiatric care     Hypertension     Major depressive disorder, single episode, moderate with anxious distress 2020    Malignant neoplasm of upper-outer quadrant of right breast in female, estrogen receptor positive 3/14/2019    radiation    Psychiatric problem     Sleep apnea     Sleep difficulties     Stroke 2009    no residual defect    Therapy        Past Surgical History:   Procedure Laterality Date    APPENDECTOMY      AXILLARY NODE DISSECTION Right 2020    Procedure: LYMPHADENECTOMY, AXILLARY;  Surgeon: Artemio Starks MD;  Location: [facility];  Service: General;  Laterality: Right;    BIOPSY OF AXILLARY NODE Right 2021    Procedure: BIOPSY, LYMPH NODE, AXILLARY;  Surgeon: Artemio Sutton MD;  Location: [address];  Service: General;  Laterality: Right;    BREAST BIOPSY      2019    BREAST LUMPECTOMY      2019     SECTION      x 1    OOPHORECTOMY      right     SENTINEL LYMPH NODE BIOPSY Right 2019    Procedure: BIOPSY, LYMPH NODE, SENTINEL;  Surgeon: Artemio Starks MD;  Location: [facility];  Service: General;  Laterality: Right;    splenic artery aneurysm repair      TONSILLECTOMY         Review of patient's allergies indicates:   Allergen Reactions    Pcn [penicillins] Hives       Current Neurological Medications:     No current facility-administered medications on file prior to encounter.     Current Outpatient Medications on File Prior to Encounter   Medication Sig    ALPRAZolam (XANAX) 0.5 MG tablet Take 1 tablet (0.5 mg total) by mouth 2 (two) times daily as needed for Anxiety.    apixaban (ELIQUIS) 5 mg Tab Take 1 tablet (5 mg total) by mouth 2 (two) times daily.    atorvastatin (LIPITOR) 20 MG tablet Take 1 tablet (20 mg total)  by mouth once daily.    busPIRone (BUSPAR) 15 MG tablet Take 1 tablet (15 mg total) by mouth 3 (three) times daily as needed (anxiety).    butalbital-acetaminophen-caffeine -40 mg (FIORICET, ESGIC) -40 mg per tablet TAKE 1 TABLET DAILY AS NEEDED FOR PAIN OR HEADACHES.    carvediloL (COREG) 25 MG tablet Take 1 tablet (25 mg total) by mouth 2 (two) times daily with meals.    cholecalciferol, vitamin D3, 1,250 mcg (50,000 unit) capsule Take 1 capsule (50,000 Units total) by mouth once a week.    cloNIDine (CATAPRES) 0.1 MG tablet Take 1 tablet (0.1 mg total) by mouth 2 (two) times daily as needed (BP>170/95).    diclofenac sodium (VOLTAREN) 1 % Gel Apply 2 grams topically once daily.    doxazosin (CARDURA) 4 MG tablet Take 1 tablet (4 mg total) by mouth every evening.    DULoxetine (CYMBALTA) 30 MG capsule Take 30 mg by mouth once daily.    furosemide (LASIX) 20 MG tablet Take 1 tablet by mouth BID    hydrALAZINE (APRESOLINE) 100 MG tablet Take 1 tablet (100 mg total) by mouth every 8 (eight) hours.    hydroCHLOROthiazide (HYDRODIURIL) 50 MG tablet TAKE 1 TABLET ONCE DAILY.    hydrOXYzine HCL (ATARAX) 25 MG tablet TAKE 1 TABLET EVERY EVENING    letrozole (FEMARA) 2.5 mg Tab Take 1 tablet (2.5 mg total) by mouth once daily.    LIDOcaine (LIDODERM) 5 % Place 2 patches onto the skin once daily. Remove & Discard patch within 12 hours or as directed by MD    multivitamin (THERAGRAN) per tablet Take 1 tablet by mouth once daily.    oxybutynin (DITROPAN) 5 MG Tab Take 1 tablet (5 mg total) by mouth once daily.    oxyCODONE-acetaminophen (PERCOCET) 7.5-325 mg per tablet Take 1 tablet by mouth every 6 (six) hours as needed for Pain.    pregabalin (LYRICA) 75 MG capsule Take 1 capsule (75 mg total) by mouth 3 (three) times daily.    ranolazine (RANEXA) 1,000 mg Tb12 Take 1 tablet (1,000 mg total) by mouth 2 (two) times daily.    sacubitriL-valsartan (ENTRESTO) 24-26 mg per tablet Take 1 tablet by mouth 2 (two) times  daily.    tiZANidine (ZANAFLEX) 4 MG tablet Take 1 tablet (4 mg total) by mouth every 8 (eight) hours.    zolpidem (AMBIEN) 5 MG Tab Take 1 tablet (5 mg total) by mouth nightly as needed (sleep).     Family History       Problem Relation (Age of Onset)    Diabetes Maternal Grandmother, Maternal Grandfather, Mother    Hypertension Mother    Sickle cell anemia Daughter          Tobacco Use    Smoking status: Never    Smokeless tobacco: Never   Substance and Sexual Activity    Alcohol use: No    Drug use: No    Sexual activity: Yes     Partners: Male     Birth control/protection: Post-menopausal     Review of Systems   Constitutional:  Negative for chills and fever.   Respiratory:  Negative for shortness of breath.    Neurological:  Positive for light-headedness. Negative for syncope, facial asymmetry, speech difficulty and weakness.   Psychiatric/Behavioral:  Negative for agitation and behavioral problems.    Objective:     Vital Signs (Most Recent):  Temp: 98 °F (36.7 °C) (03/12/23 0400)  Pulse: 75 (03/12/23 0915)  Resp: (!) 23 (03/12/23 0915)  BP: (!) 175/93 (03/12/23 0900)  SpO2: 100 % (03/12/23 0915)   Vital Signs (24h Range):  Temp:  [97.5 °F (36.4 °C)-98.2 °F (36.8 °C)] 98 °F (36.7 °C)  Pulse:  [59-88] 75  Resp:  [18-59] 23  SpO2:  [99 %-100 %] 100 %  BP: (115-216)/(60-95) 175/93     Weight: 135.7 kg (299 lb 2.6 oz)  Body mass index is 44.18 kg/m².    Physical Exam  HENT:      Head: Normocephalic and atraumatic.   Eyes:      Extraocular Movements: Extraocular movements intact.   Pulmonary:      Effort: No respiratory distress.   Neurological:      Mental Status: She is alert and oriented to person, place, and time.   Psychiatric:         Speech: Speech normal.       NEUROLOGICAL EXAMINATION:     MENTAL STATUS   Oriented to person, place, and time.   Attention: normal. Concentration: normal.   Speech: speech is normal   Level of consciousness: alert  Normal comprehension.     CRANIAL NERVES     CN III, IV, VI    Ophthalmoparesis: none    CN VII   Facial expression full, symmetric.     MOTOR EXAM        No focal motor weakness      SENSORY EXAM        No focal sensory deficits      GAIT AND COORDINATION     Tremor   Resting tremor: absent    Significant Labs: Hemoglobin A1c: No results for input(s): HGBA1C in the last 720 hours.  Blood Culture: No results for input(s): LABBLOO in the last 48 hours.  BMP:   Recent Labs   Lab 03/10/23  1526 03/11/23  0422 03/12/23  0722   * 111* 110    139 143   K 4.1 4.0 4.8    108 110   CO2 18* 21* 19*   BUN 31* 28* 13   CREATININE 1.7* 1.4 0.9   CALCIUM 9.1 9.2 9.3   MG 1.9 1.8 1.9     CBC:   Recent Labs   Lab 03/11/23  0422 03/12/23  0722   WBC 10.96 9.45   HGB 11.8* 12.2   HCT 36.8* 38.7    246     CMP:   Recent Labs   Lab 03/10/23  1526 03/11/23  0422 03/12/23  0722   * 111* 110    139 143   K 4.1 4.0 4.8    108 110   CO2 18* 21* 19*   BUN 31* 28* 13   CREATININE 1.7* 1.4 0.9   CALCIUM 9.1 9.2 9.3   MG 1.9 1.8 1.9   PROT  --  6.8  6.8  --    ALBUMIN  --  3.1*  3.1*  --    BILITOT  --  0.2  0.2  --    ALKPHOS  --  108  108  --    AST  --  12  12  --    ALT  --  12  12  --    ANIONGAP 13 10 14     CSF Culture: No results for input(s): CSFCULTURE in the last 48 hours.  CSF Studies: No results for input(s): ALIQUT, APPEARCSF, COLORCSF, CSFWBC, CSFRBC, GLUCCSF, LDHCSF, PROTEINCSF, VDRLCSF in the last 48 hours.  Inflammatory Markers: No results for input(s): SEDRATE, CRP, PROCAL in the last 48 hours.  POCT Glucose:   Recent Labs   Lab 03/11/23  1808 03/12/23  1132   POCTGLUCOSE 102 123*     Prealbumin: No results for input(s): PREALBUMIN in the last 48 hours.  Respiratory Culture: No results for input(s): GSRESP, RESPIRATORYC in the last 48 hours.  Urine Culture: No results for input(s): LABURIN in the last 48 hours.  Urine Studies: No results for input(s): COLORU, APPEARANCEUA, PHUR, SPECGRAV, PROTEINUA, GLUCUA, KETONESU, BILIRUBINUA,  OCCULTUA, NITRITE, UROBILINOGEN, LEUKOCYTESUR, RBCUA, WBCUA, BACTERIA, SQUAMEPITHEL, HYALINECASTS in the last 48 hours.    Invalid input(s): JAYRO  Recent Lab Results         03/12/23  1132   03/12/23  0722   03/11/23  1808        Anion Gap   14         Baso #   0.06         Basophil %   0.6         BUN   13         Calcium   9.3         Chloride   110         CO2   19         Creatinine   0.9         Differential Method   Automated         eGFR   >60         Eos #   0.1         Eosinophil %   1.3         Glucose   110         Gran # (ANC)   5.8         Gran %   61.0         Hematocrit   38.7         Hemoglobin   12.2         Immature Grans (Abs)   0.06  Comment: Mild elevation in immature granulocytes is non specific and   can be seen in a variety of conditions including stress response,   acute inflammation, trauma and pregnancy. Correlation with other   laboratory and clinical findings is essential.           Immature Granulocytes   0.6         Lymph #   2.6         Lymph %   27.3         Magnesium   1.9         MCH   24.7         MCHC   31.5         MCV   78         Mono #   0.9         Mono %   9.2         MPV   9.6         nRBC   0         Platelets   246         POCT Glucose 123     102       Potassium   4.8         RBC   4.94         RDW   17.2         Sodium   143         WBC   9.45               All pertinent lab results from the past 24 hours have been reviewed.    Significant Imaging: I have reviewed and interpreted all pertinent imaging results/findings within the past 24 hours.

## 2023-03-12 NOTE — ASSESSMENT & PLAN NOTE
Likely due to vagal mediated   Cardiology on board, recommended to hold Coreg/AV jena blocking medications   Medical management for diastolic heart failure   Noted to have negative recent stress test    Recommend 1 week  Holter as outpatient ;  Patient follows with Dr. Hobbs, recommended to follow up outpatient

## 2023-03-12 NOTE — ASSESSMENT & PLAN NOTE
Likely multifactorial  Prn Fioricet    Stated having history of headaches over past 6 months, follows with primary care who has recommended outpatient neurologist, stated having upcoming appointment.    Stated that she takes butalbital/acetaminophen/caffeine p.r.n. for headache.  Denied any diagnosis of migraine.    Has been evaluated by Neurology on 03/12, recommended to control ortho stats, correct electrolytes as needed, PT OT, delirium/fall precautions.  MRI, MRA head and neck negative   Recommended to be compliant with glasses  Blood pressure control   Hydration

## 2023-03-12 NOTE — CONSULTS
[facility] (Hospital)  Neurology  Consult Note    Patient Name: Susu Luther  MRN: [id number]  Admission Date: 3/9/2023  Hospital Length of Stay: 1 days  Code Status: Full Code   Attending Provider: Gita Mariano, *   Consulting Provider: Hernandez Moulton MD  Primary Care Physician: Peace Arias MD  Principal Problem:Hypertension    Inpatient consult to Telemedicine-General Neurology  Consult performed by: Hernandez Moulton MD  Consult ordered by: Gita Mariano MD         Subjective:     Chief Complaint:  Dizziness     HPI:   Ms. Luther is a 72y/o AA female with PMHx of diastolic HF, CVA, HLD, breast CA, hx of recurrent PE, AAA s/p repair, HTN, anxiety,  TANNER, NILS, obesity with admission concerns for dizziness / near syncope.     On ongoing management for symptomatic bradycardia / post dopamine gtt management / Stable exam. Review of presentation - low suspicion or concerns for primary neurological etiology. Negative for any clinical suspicion of seizures or primary autonomic failure or ANS dysfunction. MRI brain negative for any acute findings.or primary neurodegenerative disorders. MRA negative for any focal occlusive or high grade stenotic pathology.        Past Medical History:   Diagnosis Date    Chronic diastolic congestive heart failure 8/25/2020    Encounter for blood transfusion     History of repair of aneurysm of abdominal aorta using endovascular stent graft     DR Bonds    Hx of psychiatric care     Hypertension     Major depressive disorder, single episode, moderate with anxious distress 1/14/2020    Malignant neoplasm of upper-outer quadrant of right breast in female, estrogen receptor positive 3/14/2019    radiation    Psychiatric problem     Sleep apnea     Sleep difficulties     Stroke 2009    no residual defect    Therapy        Past Surgical History:   Procedure Laterality Date    APPENDECTOMY      AXILLARY NODE DISSECTION Right  2020    Procedure: LYMPHADENECTOMY, AXILLARY;  Surgeon: Artemio Starks MD;  Location: [facility] OR;  Service: General;  Laterality: Right;    BIOPSY OF AXILLARY NODE Right 2021    Procedure: BIOPSY, LYMPH NODE, AXILLARY;  Surgeon: Artemio Sutton MD;  Location: [address];  Service: General;  Laterality: Right;    BREAST BIOPSY      2019    BREAST LUMPECTOMY      2019     SECTION      x 1    OOPHORECTOMY      right     SENTINEL LYMPH NODE BIOPSY Right 2019    Procedure: BIOPSY, LYMPH NODE, SENTINEL;  Surgeon: Artemio Starks MD;  Location: [facility] OR;  Service: General;  Laterality: Right;    splenic artery aneurysm repair      TONSILLECTOMY         Review of patient's allergies indicates:   Allergen Reactions    Pcn [penicillins] Hives       Current Neurological Medications:     No current facility-administered medications on file prior to encounter.     Current Outpatient Medications on File Prior to Encounter   Medication Sig    ALPRAZolam (XANAX) 0.5 MG tablet Take 1 tablet (0.5 mg total) by mouth 2 (two) times daily as needed for Anxiety.    apixaban (ELIQUIS) 5 mg Tab Take 1 tablet (5 mg total) by mouth 2 (two) times daily.    atorvastatin (LIPITOR) 20 MG tablet Take 1 tablet (20 mg total) by mouth once daily.    busPIRone (BUSPAR) 15 MG tablet Take 1 tablet (15 mg total) by mouth 3 (three) times daily as needed (anxiety).    butalbital-acetaminophen-caffeine -40 mg (FIORICET, ESGIC) -40 mg per tablet TAKE 1 TABLET DAILY AS NEEDED FOR PAIN OR HEADACHES.    carvediloL (COREG) 25 MG tablet Take 1 tablet (25 mg total) by mouth 2 (two) times daily with meals.    cholecalciferol, vitamin D3, 1,250 mcg (50,000 unit) capsule Take 1 capsule (50,000 Units total) by mouth once a week.    cloNIDine (CATAPRES) 0.1 MG tablet Take 1 tablet (0.1 mg total) by mouth 2 (two) times daily as needed (BP>170/95).    diclofenac sodium (VOLTAREN) 1 % Gel Apply 2 grams  topically once daily.    doxazosin (CARDURA) 4 MG tablet Take 1 tablet (4 mg total) by mouth every evening.    DULoxetine (CYMBALTA) 30 MG capsule Take 30 mg by mouth once daily.    furosemide (LASIX) 20 MG tablet Take 1 tablet by mouth BID    hydrALAZINE (APRESOLINE) 100 MG tablet Take 1 tablet (100 mg total) by mouth every 8 (eight) hours.    hydroCHLOROthiazide (HYDRODIURIL) 50 MG tablet TAKE 1 TABLET ONCE DAILY.    hydrOXYzine HCL (ATARAX) 25 MG tablet TAKE 1 TABLET EVERY EVENING    letrozole (FEMARA) 2.5 mg Tab Take 1 tablet (2.5 mg total) by mouth once daily.    LIDOcaine (LIDODERM) 5 % Place 2 patches onto the skin once daily. Remove & Discard patch within 12 hours or as directed by MD    multivitamin (THERAGRAN) per tablet Take 1 tablet by mouth once daily.    oxybutynin (DITROPAN) 5 MG Tab Take 1 tablet (5 mg total) by mouth once daily.    oxyCODONE-acetaminophen (PERCOCET) 7.5-325 mg per tablet Take 1 tablet by mouth every 6 (six) hours as needed for Pain.    pregabalin (LYRICA) 75 MG capsule Take 1 capsule (75 mg total) by mouth 3 (three) times daily.    ranolazine (RANEXA) 1,000 mg Tb12 Take 1 tablet (1,000 mg total) by mouth 2 (two) times daily.    sacubitriL-valsartan (ENTRESTO) 24-26 mg per tablet Take 1 tablet by mouth 2 (two) times daily.    tiZANidine (ZANAFLEX) 4 MG tablet Take 1 tablet (4 mg total) by mouth every 8 (eight) hours.    zolpidem (AMBIEN) 5 MG Tab Take 1 tablet (5 mg total) by mouth nightly as needed (sleep).     Family History       Problem Relation (Age of Onset)    Diabetes Maternal Grandmother, Maternal Grandfather, Mother    Hypertension Mother    Sickle cell anemia Daughter          Tobacco Use    Smoking status: Never    Smokeless tobacco: Never   Substance and Sexual Activity    Alcohol use: No    Drug use: No    Sexual activity: Yes     Partners: Male     Birth control/protection: Post-menopausal     Review of Systems   Constitutional:  Negative for  chills and fever.   Respiratory:  Negative for shortness of breath.    Neurological:  Positive for light-headedness. Negative for syncope, facial asymmetry, speech difficulty and weakness.   Psychiatric/Behavioral:  Negative for agitation and behavioral problems.    Objective:     Vital Signs (Most Recent):  Temp: 98 °F (36.7 °C) (03/12/23 0400)  Pulse: 75 (03/12/23 0915)  Resp: (!) 23 (03/12/23 0915)  BP: (!) 175/93 (03/12/23 0900)  SpO2: 100 % (03/12/23 0915)   Vital Signs (24h Range):  Temp:  [97.5 °F (36.4 °C)-98.2 °F (36.8 °C)] 98 °F (36.7 °C)  Pulse:  [59-88] 75  Resp:  [18-59] 23  SpO2:  [99 %-100 %] 100 %  BP: (115-216)/(60-95) 175/93     Weight: 135.7 kg (299 lb 2.6 oz)  Body mass index is 44.18 kg/m².    Physical Exam  HENT:      Head: Normocephalic and atraumatic.   Eyes:      Extraocular Movements: Extraocular movements intact.   Pulmonary:      Effort: No respiratory distress.   Neurological:      Mental Status: She is alert and oriented to person, place, and time.   Psychiatric:         Speech: Speech normal.       NEUROLOGICAL EXAMINATION:     MENTAL STATUS   Oriented to person, place, and time.   Attention: normal. Concentration: normal.   Speech: speech is normal   Level of consciousness: alert  Normal comprehension.     CRANIAL NERVES     CN III, IV, VI   Ophthalmoparesis: none    CN VII   Facial expression full, symmetric.     MOTOR EXAM        No focal motor weakness      SENSORY EXAM        No focal sensory deficits      GAIT AND COORDINATION     Tremor   Resting tremor: absent    Significant Labs: Hemoglobin A1c: No results for input(s): HGBA1C in the last 720 hours.  Blood Culture: No results for input(s): LABBLOO in the last 48 hours.  BMP:   Recent Labs   Lab 03/10/23  1526 03/11/23  0422 03/12/23  0722   * 111* 110    139 143   K 4.1 4.0 4.8    108 110   CO2 18* 21* 19*   BUN 31* 28* 13   CREATININE 1.7* 1.4 0.9   CALCIUM 9.1 9.2 9.3   MG 1.9 1.8 1.9     CBC:   Recent  Labs   Lab 03/11/23  0422 03/12/23  0722   WBC 10.96 9.45   HGB 11.8* 12.2   HCT 36.8* 38.7    246     CMP:   Recent Labs   Lab 03/10/23  1526 03/11/23 0422 03/12/23  0722   * 111* 110    139 143   K 4.1 4.0 4.8    108 110   CO2 18* 21* 19*   BUN 31* 28* 13   CREATININE 1.7* 1.4 0.9   CALCIUM 9.1 9.2 9.3   MG 1.9 1.8 1.9   PROT  --  6.8  6.8  --    ALBUMIN  --  3.1*  3.1*  --    BILITOT  --  0.2  0.2  --    ALKPHOS  --  108  108  --    AST  --  12  12  --    ALT  --  12  12  --    ANIONGAP 13 10 14     CSF Culture: No results for input(s): CSFCULTURE in the last 48 hours.  CSF Studies: No results for input(s): ALIQUT, APPEARCSF, COLORCSF, CSFWBC, CSFRBC, GLUCCSF, LDHCSF, PROTEINCSF, VDRLCSF in the last 48 hours.  Inflammatory Markers: No results for input(s): SEDRATE, CRP, PROCAL in the last 48 hours.  POCT Glucose:   Recent Labs   Lab 03/11/23 1808 03/12/23  1132   POCTGLUCOSE 102 123*     Prealbumin: No results for input(s): PREALBUMIN in the last 48 hours.  Respiratory Culture: No results for input(s): GSRESP, RESPIRATORYC in the last 48 hours.  Urine Culture: No results for input(s): LABURIN in the last 48 hours.  Urine Studies: No results for input(s): COLORU, APPEARANCEUA, PHUR, SPECGRAV, PROTEINUA, GLUCUA, KETONESU, BILIRUBINUA, OCCULTUA, NITRITE, UROBILINOGEN, LEUKOCYTESUR, RBCUA, WBCUA, BACTERIA, SQUAMEPITHEL, HYALINECASTS in the last 48 hours.    Invalid input(s): WRIGHTSUR  Recent Lab Results         03/12/23  1132   03/12/23  0722   03/11/23  1808        Anion Gap   14         Baso #   0.06         Basophil %   0.6         BUN   13         Calcium   9.3         Chloride   110         CO2   19         Creatinine   0.9         Differential Method   Automated         eGFR   >60         Eos #   0.1         Eosinophil %   1.3         Glucose   110         Gran # (ANC)   5.8         Gran %   61.0         Hematocrit   38.7         Hemoglobin   12.2         Immature Grans (Abs)    0.06  Comment: Mild elevation in immature granulocytes is non specific and   can be seen in a variety of conditions including stress response,   acute inflammation, trauma and pregnancy. Correlation with other   laboratory and clinical findings is essential.           Immature Granulocytes   0.6         Lymph #   2.6         Lymph %   27.3         Magnesium   1.9         MCH   24.7         MCHC   31.5         MCV   78         Mono #   0.9         Mono %   9.2         MPV   9.6         nRBC   0         Platelets   246         POCT Glucose 123     102       Potassium   4.8         RBC   4.94         RDW   17.2         Sodium   143         WBC   9.45               All pertinent lab results from the past 24 hours have been reviewed.    Significant Imaging: I have reviewed and interpreted all pertinent imaging results/findings within the past 24 hours.    Assessment and Plan:     Dizziness  72y/o AA female with PMHx of diastolic HF, CVA, HLD, breast CA, hx of recurrent PE, AAA s/p repair, HTN, anxiety,  TANNER, NILS, obesity with admission concerns for dizziness / near syncope.     On ongoing management for symptomatic bradycardia / post dopamine gtt management / Stable exam. Review of presentation - low suspicion or concerns for primary neurological etiology. Negative for any clinical suspicion of seizures or primary autonomic failure or ANS dysfunction. Negative for any background of parkinsons / MSA. MRI brain negative for any acute findings.or primary neurodegenerative disorders. MRA negative for any focal occlusive or high grade stenotic pathology.     Recs:  No further interventions / investigations needed from neuro standpoint   Continue management as per cards    -- control orthostats   -- Correct lytes as needed / control infection if any / avoid hypoxia or hypercapnia / avoid hypoglycemia   -- Correct any nutritional deficiencies / correct anemia / provide nutritional support as appropriate / ambulate when  appropriate - PT/OT recs   -- delirium precautions / fall precautions          VTE Risk Mitigation (From admission, onward)         Ordered     apixaban tablet 5 mg  2 times daily         03/09/23 1624     Reason for No Pharmacological VTE Prophylaxis  Once        Question:  Reasons:  Answer:  Already adequately anticoagulated on oral Anticoagulants    03/09/23 1620     IP VTE HIGH RISK PATIENT  Once         03/09/23 1620     Place sequential compression device  Until discontinued         03/09/23 1620                Thank you for your consult.   Hernandez Moulton MD  Neurology  [facility] ([facility])

## 2023-03-12 NOTE — ASSESSMENT & PLAN NOTE
Patient with acute kidney injury likely due to IVVD/dehydration TANNER is currently worsening. Labs reviewed- Renal function/electrolytes with Estimated Creatinine Clearance: 85.1 mL/min (based on SCr of 0.9 mg/dL). according to latest data. Monitor urine output and serial BMP and adjust therapy as needed. Avoid nephrotoxins and renally dose meds for GFR listed above.   Suspect IVVD as hemoglobin appears concentrated based upon chart reviwe, currently 14 but baseline around 11  Hold diuretics  IVFs  Repeat labs in am    3/12   Creatinine stable

## 2023-03-12 NOTE — ASSESSMENT & PLAN NOTE
Patient is identified as having Diastolic (HFpEF) heart failure that is Chronic. CHF is currently controlled. Latest ECHO performed and demonstrates- Results for orders placed during the hospital encounter of 02/15/22    Echo    Interpretation Summary  · The left ventricle is normal in size with concentric hypertrophy and normal systolic function.  · The estimated ejection fraction is 60%.  · Normal left ventricular diastolic function.  · Normal right ventricular size with normal right ventricular systolic function.  · Mild to moderate tricuspid regurgitation.  · Normal central venous pressure (3 mmHg).  · The estimated PA systolic pressure is 36 mmHg.  . Continue Beta Blocker and monitor clinical status closely. Monitor on telemetry. Patient is off CHF pathway.  Monitor strict Is&Os and daily weights.  Place on fluid restriction of 1.5 L. Continue to stress to patient importance of self efficacy and  on diet for CHF. Last BNP reviewed- and noted below   Recent Labs   Lab 03/09/23  1508   BNP 29   .  Hold Entresto and diuretics  Monitor volume status    3/12   With bilateral lower extremity swelling   Creatinine stable   Resume home diuretic  Monitor creatinine

## 2023-03-12 NOTE — TREATMENT PLAN
Patient's neuroimaging unremarkable for any sign of stroke, stenosis, occlusion, or acute other physiology.  We will begin lowering blood pressure to normal slowly.  Started on low-dose Norvasc this afternoon.  Hydralazine p.r.n. available for SBP greater than 180.  Continue to monitor for changes.  Patient remains stable care outside of ICU setting.

## 2023-03-12 NOTE — HPI
Ms. Luther is a 74y/o AA female with PMHx of diastolic HF, CVA, HLD, breast CA, hx of recurrent PE, AAA s/p repair, HTN, anxiety,  TANNER, NILS, obesity with admission concerns for dizziness / near syncope.     On ongoing management for symptomatic bradycardia / post dopamine gtt management / Stable exam. Review of presentation - low suspicion or concerns for primary neurological etiology. Negative for any clinical suspicion of seizures or primary autonomic failure or ANS dysfunction. MRI brain negative for any acute findings.or primary neurodegenerative disorders. MRA negative for any focal occlusive or high grade stenotic pathology.

## 2023-03-12 NOTE — ASSESSMENT & PLAN NOTE
74y/o AA female with PMHx of diastolic HF, CVA, HLD, breast CA, hx of recurrent PE, AAA s/p repair, HTN, anxiety,  TANNER, NILS, obesity with admission concerns for dizziness / near syncope.     On ongoing management for symptomatic bradycardia / post dopamine gtt management / Stable exam. Review of presentation - low suspicion or concerns for primary neurological etiology. Negative for any clinical suspicion of seizures or primary autonomic failure or ANS dysfunction. Negative for any background of parkinsons / MSA. MRI brain negative for any acute findings.or primary neurodegenerative disorders. MRA negative for any focal occlusive or high grade stenotic pathology.     Recs:  No further interventions / investigations needed from neuro standpoint   Continue management as per cards    -- control orthostats   -- Correct lytes as needed / control infection if any / avoid hypoxia or hypercapnia / avoid hypoglycemia   -- Correct any nutritional deficiencies / correct anemia / provide nutritional support as appropriate / ambulate when appropriate - PT/OT recs   -- delirium precautions / fall precautions

## 2023-03-13 ENCOUNTER — PATIENT MESSAGE (OUTPATIENT)
Dept: INTERNAL MEDICINE | Facility: CLINIC | Age: 73
End: 2023-03-13
Payer: MEDICARE

## 2023-03-13 PROBLEM — R42 DIZZINESS: Status: RESOLVED | Noted: 2023-03-12 | Resolved: 2023-03-13

## 2023-03-13 PROBLEM — I95.1 ORTHOSTATIC HYPOTENSION: Status: ACTIVE | Noted: 2023-03-10

## 2023-03-13 PROBLEM — R33.9 URINARY RETENTION: Status: ACTIVE | Noted: 2023-03-13

## 2023-03-13 PROBLEM — R47.89 ALTERATION IN SPEECH: Status: RESOLVED | Noted: 2023-03-10 | Resolved: 2023-03-13

## 2023-03-13 LAB
ANION GAP SERPL CALC-SCNC: 11 MMOL/L (ref 8–16)
BASOPHILS # BLD AUTO: 0.06 K/UL (ref 0–0.2)
BASOPHILS NFR BLD: 0.7 % (ref 0–1.9)
BUN SERPL-MCNC: 9 MG/DL (ref 8–23)
CALCIUM SERPL-MCNC: 9.4 MG/DL (ref 8.7–10.5)
CHLORIDE SERPL-SCNC: 106 MMOL/L (ref 95–110)
CO2 SERPL-SCNC: 22 MMOL/L (ref 23–29)
CREAT SERPL-MCNC: 0.9 MG/DL (ref 0.5–1.4)
DIFFERENTIAL METHOD: ABNORMAL
EOSINOPHIL # BLD AUTO: 0.1 K/UL (ref 0–0.5)
EOSINOPHIL NFR BLD: 1 % (ref 0–8)
ERYTHROCYTE [DISTWIDTH] IN BLOOD BY AUTOMATED COUNT: 17.2 % (ref 11.5–14.5)
EST. GFR  (NO RACE VARIABLE): >60 ML/MIN/1.73 M^2
GLUCOSE SERPL-MCNC: 145 MG/DL (ref 70–110)
HCT VFR BLD AUTO: 40.4 % (ref 37–48.5)
HGB BLD-MCNC: 12.7 G/DL (ref 12–16)
IMM GRANULOCYTES # BLD AUTO: 0.03 K/UL (ref 0–0.04)
IMM GRANULOCYTES NFR BLD AUTO: 0.3 % (ref 0–0.5)
LYMPHOCYTES # BLD AUTO: 2.5 K/UL (ref 1–4.8)
LYMPHOCYTES NFR BLD: 27.6 % (ref 18–48)
MAGNESIUM SERPL-MCNC: 1.6 MG/DL (ref 1.6–2.6)
MCH RBC QN AUTO: 24.8 PG (ref 27–31)
MCHC RBC AUTO-ENTMCNC: 31.4 G/DL (ref 32–36)
MCV RBC AUTO: 79 FL (ref 82–98)
MONOCYTES # BLD AUTO: 0.8 K/UL (ref 0.3–1)
MONOCYTES NFR BLD: 9.2 % (ref 4–15)
NEUTROPHILS # BLD AUTO: 5.6 K/UL (ref 1.8–7.7)
NEUTROPHILS NFR BLD: 61.2 % (ref 38–73)
NRBC BLD-RTO: 0 /100 WBC
PLATELET # BLD AUTO: 273 K/UL (ref 150–450)
PMV BLD AUTO: 10 FL (ref 9.2–12.9)
POCT GLUCOSE: 119 MG/DL (ref 70–110)
POCT GLUCOSE: 120 MG/DL (ref 70–110)
POTASSIUM SERPL-SCNC: 3.8 MMOL/L (ref 3.5–5.1)
RBC # BLD AUTO: 5.12 M/UL (ref 4–5.4)
SODIUM SERPL-SCNC: 139 MMOL/L (ref 136–145)
WBC # BLD AUTO: 9.09 K/UL (ref 3.9–12.7)

## 2023-03-13 PROCEDURE — 25000003 PHARM REV CODE 250: Mod: HCNC | Performed by: INTERNAL MEDICINE

## 2023-03-13 PROCEDURE — 63600175 PHARM REV CODE 636 W HCPCS: Mod: HCNC | Performed by: INTERNAL MEDICINE

## 2023-03-13 PROCEDURE — 36415 COLL VENOUS BLD VENIPUNCTURE: CPT | Mod: HCNC | Performed by: NURSE PRACTITIONER

## 2023-03-13 PROCEDURE — 25000003 PHARM REV CODE 250: Mod: HCNC | Performed by: NURSE PRACTITIONER

## 2023-03-13 PROCEDURE — 99900035 HC TECH TIME PER 15 MIN (STAT): Mod: HCNC

## 2023-03-13 PROCEDURE — 85025 COMPLETE CBC W/AUTO DIFF WBC: CPT | Mod: HCNC | Performed by: NURSE PRACTITIONER

## 2023-03-13 PROCEDURE — 83735 ASSAY OF MAGNESIUM: CPT | Mod: HCNC | Performed by: NURSE PRACTITIONER

## 2023-03-13 PROCEDURE — 97530 THERAPEUTIC ACTIVITIES: CPT | Mod: HCNC

## 2023-03-13 PROCEDURE — 92526 ORAL FUNCTION THERAPY: CPT | Mod: HCNC

## 2023-03-13 PROCEDURE — 80048 BASIC METABOLIC PNL TOTAL CA: CPT | Mod: HCNC | Performed by: NURSE PRACTITIONER

## 2023-03-13 PROCEDURE — 97162 PT EVAL MOD COMPLEX 30 MIN: CPT | Mod: HCNC

## 2023-03-13 PROCEDURE — 21400001 HC TELEMETRY ROOM: Mod: HCNC

## 2023-03-13 PROCEDURE — 25000003 PHARM REV CODE 250: Mod: HCNC | Performed by: STUDENT IN AN ORGANIZED HEALTH CARE EDUCATION/TRAINING PROGRAM

## 2023-03-13 RX ORDER — MAGNESIUM SULFATE HEPTAHYDRATE 40 MG/ML
2 INJECTION, SOLUTION INTRAVENOUS ONCE
Status: COMPLETED | OUTPATIENT
Start: 2023-03-13 | End: 2023-03-13

## 2023-03-13 RX ORDER — AMLODIPINE BESYLATE 10 MG/1
10 TABLET ORAL DAILY
Status: DISCONTINUED | OUTPATIENT
Start: 2023-03-13 | End: 2023-03-15 | Stop reason: HOSPADM

## 2023-03-13 RX ADMIN — SACUBITRIL AND VALSARTAN 1 TABLET: 24; 26 TABLET, FILM COATED ORAL at 08:03

## 2023-03-13 RX ADMIN — BUTALBITAL, ACETAMINOPHEN AND CAFFEINE 1 TABLET: 50; 325; 40 TABLET ORAL at 03:03

## 2023-03-13 RX ADMIN — ALPRAZOLAM 0.5 MG: 0.5 TABLET ORAL at 08:03

## 2023-03-13 RX ADMIN — LETROZOLE 2.5 MG: 2.5 TABLET, FILM COATED ORAL at 08:03

## 2023-03-13 RX ADMIN — ZOLPIDEM TARTRATE 5 MG: 5 TABLET ORAL at 10:03

## 2023-03-13 RX ADMIN — MAGNESIUM SULFATE HEPTAHYDRATE 2 G: 40 INJECTION, SOLUTION INTRAVENOUS at 08:03

## 2023-03-13 RX ADMIN — APIXABAN 5 MG: 2.5 TABLET, FILM COATED ORAL at 08:03

## 2023-03-13 RX ADMIN — CHLORHEXIDINE GLUCONATE 0.12% ORAL RINSE 15 ML: 1.2 LIQUID ORAL at 08:03

## 2023-03-13 RX ADMIN — CLONIDINE HYDROCHLORIDE 0.1 MG: 0.1 TABLET ORAL at 06:03

## 2023-03-13 RX ADMIN — HYDRALAZINE HYDROCHLORIDE 10 MG: 20 INJECTION, SOLUTION INTRAMUSCULAR; INTRAVENOUS at 03:03

## 2023-03-13 RX ADMIN — OXYBUTYNIN CHLORIDE 5 MG: 5 TABLET ORAL at 08:03

## 2023-03-13 RX ADMIN — AMLODIPINE BESYLATE 10 MG: 10 TABLET ORAL at 03:03

## 2023-03-13 RX ADMIN — RANOLAZINE 1000 MG: 500 TABLET, EXTENDED RELEASE ORAL at 08:03

## 2023-03-13 RX ADMIN — FUROSEMIDE 20 MG: 20 TABLET ORAL at 06:03

## 2023-03-13 RX ADMIN — POLYETHYLENE GLYCOL 3350 17 G: 17 POWDER, FOR SOLUTION ORAL at 08:03

## 2023-03-13 RX ADMIN — ONDANSETRON 4 MG: 2 INJECTION INTRAMUSCULAR; INTRAVENOUS at 03:03

## 2023-03-13 RX ADMIN — MUPIROCIN: 20 OINTMENT TOPICAL at 08:03

## 2023-03-13 RX ADMIN — FAMOTIDINE 20 MG: 20 TABLET ORAL at 08:03

## 2023-03-13 RX ADMIN — ACETAMINOPHEN 650 MG: 325 TABLET ORAL at 08:03

## 2023-03-13 RX ADMIN — FUROSEMIDE 20 MG: 20 TABLET ORAL at 08:03

## 2023-03-13 NOTE — ASSESSMENT & PLAN NOTE
Followed by oncology   Continue Femara   new lump felt by patient, unable to perform breast imaging as inpt  Recommend outpatient evaluation with oncology on discharge

## 2023-03-13 NOTE — ASSESSMENT & PLAN NOTE
- Cardiology reconsulted   - Continue Norvasc, lasix, entresto (dose increased by cardiology)   - monitor BP   - IV PRN meds available   - Clonidine held due to HA, Coreg held due to bradycardia

## 2023-03-13 NOTE — PT/OT/SLP EVAL
"Occupational Therapy Evaluation and Treatment    Name: Susu Luther  MRN: [id number]  Admitting Diagnosis: Hypertension  Recent Surgery: * No surgery found *      Recommendations:     Discharge Recommendations:  (TBD pending further assessment)  Level of Assistance Recommended: 24 hours significant assistance  Discharge Equipment Recommendations: walker, rolling, shower chair  Barriers to discharge: None    Assessment:     Susu Luther is a 73 y.o. female with a medical diagnosis of Hypertension. She presents with performance deficits affecting function including weakness, impaired endurance, impaired self care skills, impaired functional mobility, impaired balance, decreased safety awareness, pain, impaired cardiopulmonary response to activity, edema, decreased coordination.     Rehab Prognosis: Fair; patient would benefit from acute OT services to address these deficits and reach maximum level of function.    Plan:     Patient to be seen 2 x/week to address the above listed problems via self-care/home management, therapeutic activities, therapeutic exercises  Plan of Care Expires: 03/27/23  Plan of Care Reviewed with: patient    Subjective     Chief Complaint: Patient reported "I feel dizzy, but I am going to push through it"  Patient Comments/Goals: None reported  Pain/Comfort:  Pain Rating 1: 9/10  Location - Side 1: Bilateral  Location - Orientation 1: generalized  Location 1: head (headache)  Pain Addressed 1: Nurse notified, Cessation of Activity, Distraction, Reposition (activity pacing)  Pain Rating Post-Intervention 1: 9/10  Pain Rating 2: 9/10  Location - Side 2: Left  Location - Orientation 2: generalized  Location 2: chest  Pain Addressed 2: Reposition, Distraction, Cessation of Activity, Nurse notified (activity pacing)  Pain Rating Post-Intervention 2: 9/10    Social History:  Living Environment: Patient lives with their spouse in a single story house with number of outside stair(s): 0 " .  Prior Level of Function: Prior to admission, patient was independent with ADLs and HH and community functional mobility.  Roles and Routines: Patient was driving and not working prior to admission.  Equipment Used at Home: rollator, quad cane, grab bars  DME owned (not currently used): none  Assistance Upon Discharge: significant other    Objective:     Communicated with nurse, Yennifer, prior to session. Patient found supine with peripheral IV, pulse ox (continuous), telemetry, blood pressure cuff, bed alarm, atkins catheter upon OT entry to room.    General Precautions: Standard, fall   Orthopedic Precautions:N/A   Braces: N/A  Respiratory Status: Room air    Occupational Performance    Gait belt applied - N/A    Bed Mobility:  Supine intervention only per RN due to patient's symptomatic orthostatic hypotension.    Functional Mobility/Transfers:  Functional mobility and transfers not completed because not appropriate at this time due to patient's symptomatic orthostatic hypotension. Will progress to OOB activities once deemed safe.     Activities of Daily Living:  Upper Body Dressing: set up assistance Patient donned gown with set up assistance for item retrieval.  Lower Body Dressing: set up assistance      Cognitive/Visual Perceptual:  Cognitive/Psychosocial Skills:    -       Oriented to: Person, Place, Time, Situation  -       Follows Commands/attention:Follows one-step commands  -       Communication: clear/fluent  -       Memory: No Deficits noted  -       Safety awareness/insight to disability: impaired     Physical Exam:  Dominant Hand: Right  Upper Extremity Range of Motion:    -       Right Upper Extremity: WFL  -       Left Upper Extremity: WFL  Upper Extremity Strength:    -       Right Upper Extremity: Deficits: grossly 4/5  -       Left Upper Extremity: Deficits: grossly 4/5   Strength:    -       Right Upper Extremity: Deficits: fair  -       Left Upper Extremity: Deficits: fair    [facility] 6 Click  ADL:  AMPAC Total Score: 12    Treatment & Education:  Therapist provided facilitation and instruction of proper body mechanics, energy conservation, and fall prevention strategies during tasks listed above.  Patient educated on role of OT, POC and goals for therapy  Patient tolerated supine intervention well. Patient completed B UE AROM therex while supine: shoulder flexion x 10 reps, bicep curls x 10 reps, and composite fist x 10 reps. Patient encouraged to complete throughout the day to maintain functional strength for ADL completion. Patient's BP after therex was 150/77.    Patient left HOB elevated with all lines intact and call button in reach.    GOALS:   Multidisciplinary Problems       Occupational Therapy Goals          Problem: Occupational Therapy    Goal Priority Disciplines Outcome Interventions   Occupational Therapy Goal     OT, PT/OT     Description: Goals to be met by: 3/27/23     Patient will increase functional independence with ADLs by performing:    UE Dressing with [place].  Grooming while EOB with Set-up Assistance.  Supine to sit with Stand-by Assistance.                         History:     Past Medical History:   Diagnosis Date    Chronic diastolic congestive heart failure 8/25/2020    Encounter for blood transfusion     History of repair of aneurysm of abdominal aorta using endovascular stent graft     DR Bonds    Hx of psychiatric care     Hypertension     Major depressive disorder, single episode, moderate with anxious distress 1/14/2020    Malignant neoplasm of upper-outer quadrant of right breast in female, estrogen receptor positive 3/14/2019    radiation    Psychiatric problem     Sleep apnea     Sleep difficulties     Stroke 2009    no residual defect    Therapy          Past Surgical History:   Procedure Laterality Date    APPENDECTOMY      AXILLARY NODE DISSECTION Right 12/02/2020    Procedure: LYMPHADENECTOMY, AXILLARY;  Surgeon: Artemio Starks MD;  Location: [facility] OR;   Service: General;  Laterality: Right;    BIOPSY OF AXILLARY NODE Right 2021    Procedure: BIOPSY, LYMPH NODE, AXILLARY;  Surgeon: Artemio Sutton MD;  Location: [address];  Service: General;  Laterality: Right;    BREAST BIOPSY      2019    BREAST LUMPECTOMY      2019     SECTION      x 1    OOPHORECTOMY      right     SENTINEL LYMPH NODE BIOPSY Right 2019    Procedure: BIOPSY, LYMPH NODE, SENTINEL;  Surgeon: Artemio Starks MD;  Location: [facility];  Service: General;  Laterality: Right;    splenic artery aneurysm repair      TONSILLECTOMY         Time Tracking:     OT Date of Treatment: 23  OT Start Time: 1120  OT Stop Time: 1145  OT Total Time (min): 25 min    Billable Minutes: Evaluation 10 and Therapeutic Activity 15    DEMI Morgan      3/13/2023

## 2023-03-13 NOTE — PLAN OF CARE
Pt AAOx4, VSS on room air. Sinus rhythm on monitor. Orthostatic hypotension positive. No PRN hypertension medications given on shift, with SBP maintained 140-150s. Regular diet, pt tolerating well. Adequate UOP, atkins maintained d/t urinary retention. POC reviewed with pt at bedside. Bed is locked in lowest position, side rails up x2, call light/personal items within reach, bed alarm set, pt instructed to call staff for mobility.   Temp:  [98 °F (36.7 °C)-98.2 °F (36.8 °C)]   Pulse:  []   Resp:  [10-52]   BP: ()/()   SpO2:  [94 %-100 %]      Problem: Adult Inpatient Plan of Care  Goal: Plan of Care Review  Outcome: Ongoing, Progressing  Goal: Patient-Specific Goal (Individualized)  Outcome: Ongoing, Progressing  Goal: Absence of Hospital-Acquired Illness or Injury  Outcome: Ongoing, Progressing  Goal: Optimal Comfort and Wellbeing  Outcome: Ongoing, Progressing  Goal: Readiness for Transition of Care  Outcome: Ongoing, Progressing     Problem: Bariatric Environmental Safety  Goal: Safety Maintained with Care  Outcome: Ongoing, Progressing     Problem: Fluid and Electrolyte Imbalance (Acute Kidney Injury/Impairment)  Goal: Fluid and Electrolyte Balance  Outcome: Ongoing, Progressing     Problem: Oral Intake Inadequate (Acute Kidney Injury/Impairment)  Goal: Optimal Nutrition Intake  Outcome: Ongoing, Progressing     Problem: Renal Function Impairment (Acute Kidney Injury/Impairment)  Goal: Effective Renal Function  Outcome: Ongoing, Progressing     Problem: Infection  Goal: Absence of Infection Signs and Symptoms  Outcome: Ongoing, Progressing     Problem: Fall Injury Risk  Goal: Absence of Fall and Fall-Related Injury  Outcome: Ongoing, Progressing      s/p fall in school, falling backwards and hit head on cement. Denies LOC, no vomiting. pt a&o x 3. + bruising to left side of head. Ice applied. Apical pulse auscultated

## 2023-03-13 NOTE — PT/OT/SLP EVAL
Physical Therapy Evaluation    Patient Name:  Susu Luther   MRN:  [id number]    Recommendations:     Discharge Recommendations:  (TBD pending futher assessment)   Discharge Equipment Recommendations: walker, rolling, shower chair   Barriers to discharge: None    Assessment:     Susu Luther is a 73 y.o. female admitted with a medical diagnosis of Hypertension.  She presents with the following impairments/functional limitations: weakness, impaired endurance, impaired functional mobility, gait instability, impaired balance, pain, impaired cardiopulmonary response to activity, decreased safety awareness, decreased lower extremity function, decreased coordination, edema.    Rehab Prognosis: Good; patient would benefit from acute skilled PT services to address these deficits and reach maximum level of function.    Recent Surgery: * No surgery found *      Plan:     During this hospitalization, patient to be seen 3 x/week to address the identified rehab impairments via gait training, therapeutic activities, therapeutic exercises and progress toward the following goals:    Plan of Care Expires:  03/27/23    Subjective     Chief Complaint: Pt c/o headache. Pt is motivated to get out of bed.  Patient/Family Comments/goals:  Pain/Comfort:  Pain Rating 1: 8/10  Location - Side 1: Bilateral  Location - Orientation 1: generalized  Location 1: head (headache)  Pain Addressed 1: Nurse notified  Pain Rating Post-Intervention 1: 8/10    Patients cultural, spiritual, Religion conflicts given the current situation: no    Living Environment:  Pt lives with her  in a 1 story house, no steps to enter,  is able to assist.  Prior to admission, patients level of function was INDEP with bathing, dressing, ADLs, household and community ambulation with Rolator or cane PRN, driving, retired.  Equipment used at home: rollator (Hurry[employer]ane).  DME owned (not currently used): none.  Upon discharge, patient will have  assistance from .    Objective:     Communicated with nurse Yennifer prior to session.  Patient found supine with peripheral IV, pulse ox (continuous), telemetry, blood pressure cuff, atkins catheter, bed alarm  upon PT entry to room.    General Precautions: Standard, fall  Orthopedic Precautions:N/A   Braces: N/A  Respiratory Status: Room air    Exams:  Cognitive Exam:  Patient is oriented to Person, Place, Time, and Situation  Sensation:    -       Intact  Skin Integrity/Edema:      -       Skin integrity: Visible skin intact  RLE ROM: WFL  RLE Strength: Grossly 4/5  LLE ROM: WFL  LLE Strength: Grossly 3+/5    Functional Mobility:  Bed Mobility:     Rolling Right: contact guard assistance  Fwd Scooting: contact guard assistance  Lat Scooting (sitting): contact guard assistance  Supine to Sit: contact guard assistance  Sit to Supine: contact guard assistance, c/o nausea, SOB, able to readjust self in bed  Transfers: Unable to progress due to c/o dizziness that got increased over time and drop in BP  Balance: Demonstrated good sitting balance  Blood Pressure  Initial supine: 116/64  Initial sittin/57 (c/o dizziness)  Completed ankle pumps  Sittin/61 (sx increased)  Supine: 124/69    AM-PAC 6 CLICK MOBILITY  Total Score:10     Treatment & Education:  Pt educated on role of PT in acute care and POC. Educated on importance of OOB activities when able to tolerate and HEP (hip flex, LAQ, ham curls, ankle pumps) in order to maintain/regain strength. Educated on increased risk of falling due to weakness, instructed to utilize call bell for assistance for all bed mobility. Pt agreeable to all requests.    Patient left supine with all lines intact, call button in reach, bed alarm on, and nurse notified.    GOALS:   Multidisciplinary Problems       Physical Therapy Goals          Problem: Physical Therapy    Goal Priority Disciplines Outcome Goal Variances Interventions   Physical Therapy Goal     PT, PT/OT       Description: Goals to be met by 3/27/23  Pt will complete bed mobility MOD I.  Pt will complete sit to stand MOD I.  Pt will ambulate 150ft MOD I.                     History:     Past Medical History:   Diagnosis Date    Chronic diastolic congestive heart failure 2020    Encounter for blood transfusion     History of repair of aneurysm of abdominal aorta using endovascular stent graft     DR Bonds     of psychiatric care     Hypertension     Major depressive disorder, single episode, moderate with anxious distress 2020    Malignant neoplasm of upper-outer quadrant of right breast in female, estrogen receptor positive 3/14/2019    radiation    Psychiatric problem     Sleep apnea     Sleep difficulties     Stroke 2009    no residual defect    Therapy      Past Surgical History:   Procedure Laterality Date    APPENDECTOMY      AXILLARY NODE DISSECTION Right 2020    Procedure: LYMPHADENECTOMY, AXILLARY;  Surgeon: Artemio Starks MD;  Location: [facility];  Service: General;  Laterality: Right;    BIOPSY OF AXILLARY NODE Right 2021    Procedure: BIOPSY, LYMPH NODE, AXILLARY;  Surgeon: Artemio Sutton MD;  Location: [address];  Service: General;  Laterality: Right;    BREAST BIOPSY      2019    BREAST LUMPECTOMY      2019     SECTION      x 1    OOPHORECTOMY      right     SENTINEL LYMPH NODE BIOPSY Right 2019    Procedure: BIOPSY, LYMPH NODE, SENTINEL;  Surgeon: Artemio Starks MD;  Location: [facility];  Service: General;  Laterality: Right;    splenic artery aneurysm repair      TONSILLECTOMY       Time Tracking:     PT Received On: 23  PT Start Time: 840     PT Stop Time: 09  PT Total Time (min): 25 min     Billable Minutes: Evaluation 10min and Therapeutic Activity 15min      2023

## 2023-03-13 NOTE — PLAN OF CARE
Attempt to get pt. Out of bed and into chair for a few hours before bedtime- orthostatic BP taken due to patient with reports of dizziness/light headedness during position changes. Did not transfer pt. To bedside chair, only sat at side of bed for a few minutes due to severe systolic blood pressure drop.   Pt. Medicated with PRN hypertensives x2 due to SYSBP > 200.    Pt. Updated with plan of care and denies any needs or concerns at this time.     Problem: Adult Inpatient Plan of Care  Goal: Plan of Care Review  Outcome: Ongoing, Progressing  Goal: Patient-Specific Goal (Individualized)  Outcome: Ongoing, Progressing  Goal: Absence of Hospital-Acquired Illness or Injury  Outcome: Ongoing, Progressing  Goal: Optimal Comfort and Wellbeing  Outcome: Ongoing, Progressing  Goal: Readiness for Transition of Care  Outcome: Ongoing, Progressing     Problem: Bariatric Environmental Safety  Goal: Safety Maintained with Care  Outcome: Ongoing, Progressing     Problem: Fluid and Electrolyte Imbalance (Acute Kidney Injury/Impairment)  Goal: Fluid and Electrolyte Balance  Outcome: Ongoing, Progressing     Problem: Oral Intake Inadequate (Acute Kidney Injury/Impairment)  Goal: Optimal Nutrition Intake  Outcome: Ongoing, Progressing     Problem: Renal Function Impairment (Acute Kidney Injury/Impairment)  Goal: Effective Renal Function  Outcome: Ongoing, Progressing     Problem: Infection  Goal: Absence of Infection Signs and Symptoms  Outcome: Ongoing, Progressing     Problem: Fall Injury Risk  Goal: Absence of Fall and Fall-Related Injury  Outcome: Ongoing, Progressing

## 2023-03-13 NOTE — ASSESSMENT & PLAN NOTE
Resolved, likely prerenal vs. Due to elevated BP   Monitor BMP   Renally dose meds; avoid nephrotoxic agents

## 2023-03-13 NOTE — PLAN OF CARE
OT lolis completed. Supine intervention only per RNYennifer, due to patient's symptomatic orthostatic hypotension. Patient completed B UE AROM therex x 3 planes of motion in supine. Recommendation TBD pending further assessment.

## 2023-03-13 NOTE — SUBJECTIVE & OBJECTIVE
Interval History: NAEON. Pt reports persistent headache and gen weakness today. Denies CP, SOB, N/V, abd pain. Appetite is poor     Review of Systems  Objective:     Vital Signs (Most Recent):  Temp: 98.2 °F (36.8 °C) (03/13/23 1200)  Pulse: 78 (03/13/23 1530)  Resp: (!) 52 (03/13/23 1530)  BP: (!) 188/89 (03/13/23 1500)  SpO2: 98 % (03/13/23 1530)   Vital Signs (24h Range):  Temp:  [98 °F (36.7 °C)-98.2 °F (36.8 °C)] 98.2 °F (36.8 °C)  Pulse:  [] 78  Resp:  [10-53] 52  SpO2:  [94 %-100 %] 98 %  BP: ()/() 188/89     Weight: 135.7 kg (299 lb 2.6 oz)  Body mass index is 44.18 kg/m².    Intake/Output Summary (Last 24 hours) at 3/13/2023 1546  Last data filed at 3/13/2023 1300  Gross per 24 hour   Intake 511.87 ml   Output 1735 ml   Net -1223.13 ml      Physical Exam  Vitals and nursing note reviewed.   Constitutional:       General: She is not in acute distress.     Appearance: She is obese. She is not ill-appearing.   Cardiovascular:      Rate and Rhythm: Normal rate and regular rhythm.      Heart sounds: No murmur heard.    No friction rub. No gallop.   Pulmonary:      Effort: Pulmonary effort is normal.      Breath sounds: Normal breath sounds. No wheezing, rhonchi or rales.      Comments: On room air   Abdominal:      General: Bowel sounds are normal. There is no distension.      Palpations: Abdomen is soft.      Tenderness: There is no abdominal tenderness. There is no guarding or rebound.   Genitourinary:     Comments: Puckett in place draining clear yellow urine   Musculoskeletal:      Right lower leg: No edema.      Left lower leg: No edema.   Neurological:      General: No focal deficit present.      Mental Status: She is alert and oriented to person, place, and time. Mental status is at baseline.   Psychiatric:         Mood and Affect: Mood normal.         Behavior: Behavior normal.       Significant Labs: All pertinent labs within the past 24 hours have been reviewed.    Significant Imaging:  I have reviewed all pertinent imaging results/findings within the past 24 hours.

## 2023-03-13 NOTE — PLAN OF CARE
PT EVAL complete. Required CGA for bed mobility. Interventions not progressed due to increased dizziness in sitting and drop in BP. D/C recommendation TBD pending further assessment.

## 2023-03-13 NOTE — ASSESSMENT & PLAN NOTE
Patient is identified as having Diastolic (HFpEF) heart failure that is Chronic. CHF is currently controlled. Latest ECHO performed and demonstrates- Results for orders placed during the hospital encounter of 02/15/22    Echo    Interpretation Summary  · The left ventricle is normal in size with concentric hypertrophy and normal systolic function.  · The estimated ejection fraction is 60%.  · Normal left ventricular diastolic function.  · Normal right ventricular size with normal right ventricular systolic function.  · Mild to moderate tricuspid regurgitation.  · Normal central venous pressure (3 mmHg).  · The estimated PA systolic pressure is 36 mmHg.  . Continue Furosemide and monitor clinical status closely. Monitor on telemetry. Patient is off CHF pathway.  Monitor strict Is&Os and daily weights.  Place on fluid restriction of 1.5 L. Continue to stress to patient importance of self efficacy and  on diet for CHF. Last BNP reviewed- and noted below   Recent Labs   Lab 03/09/23  1508   BNP 29   Cardiology reconsulted   Continue home Entresto and diuretics, appears euvolemic at this time   B blocker on hold due to bradycardia   Strict Is and Os

## 2023-03-13 NOTE — ASSESSMENT & PLAN NOTE
Likely due to vagal mediated, recent neg stress test. Resolved   Cardiology on board, recommended to hold Coreg/AV jena blocking medications, Recommend 1 week  Holter as outpatient   Tele monitoring

## 2023-03-13 NOTE — ASSESSMENT & PLAN NOTE
- atkins placed 3/11 due to urinary retention   -hold ditropan   - voiding trial once pt is able to ambulate with PT

## 2023-03-13 NOTE — ASSESSMENT & PLAN NOTE
Having issues with orthostatic dizziness but supine hypertension, cardiology reconsulted- discussed with NP   Neurology consulted on admission  MRI/MRA Head/Neck with no acute findings   TTE with concentric LVH, EF 65%, normal diastolic function   Continue SCDs/MONIKA hose  Monitor orthostatics

## 2023-03-13 NOTE — PT/OT/SLP PROGRESS
"Speech Language Pathology Treatment    Patient Name:  Susu Luther   MRN:  [id number]  Admitting Diagnosis: Hypertension    Recommendations:                 General Recommendations:  Follow-up not indicated  Diet recommendations:  Regular Diet - IDDSI Level 7, Liquid Diet Level: Thin liquids - IDDSI Level 0   Aspiration Precautions: Standard aspiration precautions   General Precautions: Standard, aspiration  Communication strategies:  none    Subjective     Patient seen at bedside eating lunch. She reported feeling "much better." She reported her speech symptoms are only present when her blood pressure rises.   Patient goals: To go home.      Pain/Comfort:  Pain Rating 1: 0/10  Pain Rating Post-Intervention 1: 0/10  Pain Rating 2: 0/10  Pain Rating Post-Intervention 2: 0/10    Respiratory Status: Room air    Objective:     Has the patient been evaluated by SLP for swallowing?   Yes  Keep patient NPO? No   Current Respiratory Status:        Patient seen at bedside with lunch tray. She tolerated thin liquids, puree, and solid consistencies with no overt s/s of aspiration and no reported difficulties. Additionally, she reported her speech symptoms have resolved and deficits were only present when her blood pressure pastora. Patient and clinician discussed no further ST needs at this time. Patient verbalized understanding.     Assessment:     Susu Luther is a 73 y.o. female with an SLP diagnosis of  potential dysarthria vs. Neurogenic stutter . At this time, speech deficits have resolved. Patient tolerates a regular PO diet. No further ST warranted. Please re-consult ST if needed.     Goals:   Multidisciplinary Problems       SLP Goals          Problem: SLP    Goal Priority Disciplines Outcome   SLP Goal     SLP    Description: 1. Ongoing S/E  2. Patient will demonstrate use of motor speech/fluency strategies at conversational level.                        Plan:     Patient to be seen:  2 x/week, 3 x/week "   Plan of Care expires:  03/17/23  Plan of Care reviewed with:  patient   SLP Follow-Up:  No       Discharge recommendations:  other (see comments) (pending acute progress)   Barriers to Discharge:  None    Time Tracking:     SLP Treatment Date:   03/13/23  Speech Start Time:  1220  Speech Stop Time:  1230     Speech Total Time (min):  10 min    Billable Minutes: Treatment Swallowing Dysfunction 10 minutes    03/13/2023

## 2023-03-14 ENCOUNTER — PATIENT MESSAGE (OUTPATIENT)
Dept: PHARMACY | Facility: CLINIC | Age: 73
End: 2023-03-14
Payer: MEDICARE

## 2023-03-14 LAB
ANION GAP SERPL CALC-SCNC: 13 MMOL/L (ref 8–16)
BUN SERPL-MCNC: 12 MG/DL (ref 8–23)
CALCIUM SERPL-MCNC: 10 MG/DL (ref 8.7–10.5)
CHLORIDE SERPL-SCNC: 108 MMOL/L (ref 95–110)
CO2 SERPL-SCNC: 20 MMOL/L (ref 23–29)
CREAT SERPL-MCNC: 1 MG/DL (ref 0.5–1.4)
EST. GFR  (NO RACE VARIABLE): >60 ML/MIN/1.73 M^2
GLUCOSE SERPL-MCNC: 113 MG/DL (ref 70–110)
MAGNESIUM SERPL-MCNC: 1.7 MG/DL (ref 1.6–2.6)
POCT GLUCOSE: 111 MG/DL (ref 70–110)
POCT GLUCOSE: 134 MG/DL (ref 70–110)
POCT GLUCOSE: 159 MG/DL (ref 70–110)
POTASSIUM SERPL-SCNC: 4.5 MMOL/L (ref 3.5–5.1)
SODIUM SERPL-SCNC: 141 MMOL/L (ref 136–145)

## 2023-03-14 PROCEDURE — 99900035 HC TECH TIME PER 15 MIN (STAT): Mod: HCNC

## 2023-03-14 PROCEDURE — 25000003 PHARM REV CODE 250: Mod: HCNC | Performed by: INTERNAL MEDICINE

## 2023-03-14 PROCEDURE — 97110 THERAPEUTIC EXERCISES: CPT | Mod: HCNC

## 2023-03-14 PROCEDURE — 25000003 PHARM REV CODE 250: Mod: HCNC

## 2023-03-14 PROCEDURE — 21400001 HC TELEMETRY ROOM: Mod: HCNC

## 2023-03-14 PROCEDURE — 97166 OT EVAL MOD COMPLEX 45 MIN: CPT | Mod: HCNC

## 2023-03-14 PROCEDURE — 25000003 PHARM REV CODE 250: Mod: HCNC | Performed by: STUDENT IN AN ORGANIZED HEALTH CARE EDUCATION/TRAINING PROGRAM

## 2023-03-14 PROCEDURE — 80048 BASIC METABOLIC PNL TOTAL CA: CPT | Mod: HCNC | Performed by: NURSE PRACTITIONER

## 2023-03-14 PROCEDURE — 83735 ASSAY OF MAGNESIUM: CPT | Mod: HCNC | Performed by: NURSE PRACTITIONER

## 2023-03-14 PROCEDURE — 97530 THERAPEUTIC ACTIVITIES: CPT | Mod: HCNC

## 2023-03-14 PROCEDURE — 99232 PR SUBSEQUENT HOSPITAL CARE,LEVL II: ICD-10-PCS | Mod: HCNC,,, | Performed by: INTERNAL MEDICINE

## 2023-03-14 PROCEDURE — 36415 COLL VENOUS BLD VENIPUNCTURE: CPT | Mod: HCNC | Performed by: NURSE PRACTITIONER

## 2023-03-14 PROCEDURE — 63600175 PHARM REV CODE 636 W HCPCS: Mod: HCNC | Performed by: INTERNAL MEDICINE

## 2023-03-14 PROCEDURE — 99232 SBSQ HOSP IP/OBS MODERATE 35: CPT | Mod: HCNC,,, | Performed by: INTERNAL MEDICINE

## 2023-03-14 PROCEDURE — 25000003 PHARM REV CODE 250: Mod: HCNC | Performed by: NURSE PRACTITIONER

## 2023-03-14 RX ADMIN — ZOLPIDEM TARTRATE 5 MG: 5 TABLET ORAL at 10:03

## 2023-03-14 RX ADMIN — HYDRALAZINE HYDROCHLORIDE 10 MG: 20 INJECTION, SOLUTION INTRAMUSCULAR; INTRAVENOUS at 04:03

## 2023-03-14 RX ADMIN — SACUBITRIL AND VALSARTAN 1 TABLET: 49; 51 TABLET, FILM COATED ORAL at 08:03

## 2023-03-14 RX ADMIN — FUROSEMIDE 20 MG: 20 TABLET ORAL at 05:03

## 2023-03-14 RX ADMIN — LETROZOLE 2.5 MG: 2.5 TABLET, FILM COATED ORAL at 09:03

## 2023-03-14 RX ADMIN — AMLODIPINE BESYLATE 10 MG: 10 TABLET ORAL at 09:03

## 2023-03-14 RX ADMIN — RANOLAZINE 1000 MG: 500 TABLET, EXTENDED RELEASE ORAL at 09:03

## 2023-03-14 RX ADMIN — SACUBITRIL AND VALSARTAN 1 TABLET: 24; 26 TABLET, FILM COATED ORAL at 09:03

## 2023-03-14 RX ADMIN — RANOLAZINE 1000 MG: 500 TABLET, EXTENDED RELEASE ORAL at 08:03

## 2023-03-14 RX ADMIN — FAMOTIDINE 20 MG: 20 TABLET ORAL at 08:03

## 2023-03-14 RX ADMIN — APIXABAN 5 MG: 2.5 TABLET, FILM COATED ORAL at 09:03

## 2023-03-14 RX ADMIN — ALPRAZOLAM 0.5 MG: 0.5 TABLET ORAL at 10:03

## 2023-03-14 RX ADMIN — FUROSEMIDE 20 MG: 20 TABLET ORAL at 09:03

## 2023-03-14 RX ADMIN — MUPIROCIN: 20 OINTMENT TOPICAL at 08:03

## 2023-03-14 RX ADMIN — FAMOTIDINE 20 MG: 20 TABLET ORAL at 09:03

## 2023-03-14 RX ADMIN — APIXABAN 5 MG: 2.5 TABLET, FILM COATED ORAL at 08:03

## 2023-03-14 RX ADMIN — ONDANSETRON 4 MG: 2 INJECTION INTRAMUSCULAR; INTRAVENOUS at 05:03

## 2023-03-14 RX ADMIN — ACETAMINOPHEN 650 MG: 325 TABLET ORAL at 09:03

## 2023-03-14 RX ADMIN — HYDRALAZINE HYDROCHLORIDE 10 MG: 20 INJECTION, SOLUTION INTRAMUSCULAR; INTRAVENOUS at 06:03

## 2023-03-14 RX ADMIN — CHLORHEXIDINE GLUCONATE 0.12% ORAL RINSE 15 ML: 1.2 LIQUID ORAL at 09:03

## 2023-03-14 RX ADMIN — BUTALBITAL, ACETAMINOPHEN AND CAFFEINE 1 TABLET: 50; 325; 40 TABLET ORAL at 05:03

## 2023-03-14 RX ADMIN — ACETAMINOPHEN 650 MG: 325 TABLET ORAL at 05:03

## 2023-03-14 RX ADMIN — CHLORHEXIDINE GLUCONATE 0.12% ORAL RINSE 15 ML: 1.2 LIQUID ORAL at 08:03

## 2023-03-14 RX ADMIN — MUPIROCIN: 20 OINTMENT TOPICAL at 09:03

## 2023-03-14 NOTE — PROGRESS NOTES
Former[facility] Medicine  Progress Note    Patient Name: Susu Luther  MRN: [id number]  Patient Class: IP- Inpatient   Admission Date: 3/9/2023  Length of Stay: 3 days  Attending Physician: Leana Ewing MD  Primary Care Provider: Peace Arias MD        Subjective:     Principal Problem:Hypertension        HPI:  The patient is 74 yo female with past medical history of right breast cancer, hypertension, anxiety, AAA s/p repair, CVA, diastolic heart failure, TANNER, sleep apnea, and obesity who presented to the ED due to elevated blood pressure and dizziness. She reports her systolic blood pressure has been in the 200s at home. She has a headache and is dizzy. She went to her PCP to be evaluated and had  near syncopal episode. Patient is anxious and tearful when discussing her blood pressure. She is concerned about a lump she found in upper portion of her right breast. She states she forgot to mention it to Dr. Arias. Discussed ordering outpatient US as breast US are not usually done while patients are in the hospital. She verbalized understanding. Hospital medicine consulted. Patient noted to have TANNER. Patient placed in observation.      Overview/Hospital Course:  73 year old female with a known past medical history of right breast cancer that is currently in remission, hypertension, anxiety, AAA s/p repair, CVA, diastolic heart failure, TANNER, sleep apnea, PE on Eliquis, and obesity who presented to the ED 3/9 due to elevated blood pressure, headache and dizziness. She reports her systolic blood pressure has been in the 200s at home.  She went to her PCP 3/9 to be evaluated and had a near syncopal episode.  Hospital medicine consulted and placed in observation.  On arrival CT head -ve for acute intracranial abn;      On 3/10, pt was noted to have worsening slurring and stuttering while speaking that has been worsening since overnight; stat repeat CT head obtained that was without acute findings. Vascular  "Neurology was unable to complete assessment due to patient's acute change in condition.. Later in the day patient developed nausea, vomiting, diarrhea, and episodes of bradycardia with heart rate in 30s. Pt also s/o blurry vision and the "room is spinning". Pt was on coreg for BP control that has since been held.  Cardiology was consulted.  Pt moved to the ICU on the afternoon of 3/10 for closer monitoring and has been started on dopamine gtt for HR and BP support. TSH, lactic acid, electrolytes within normal limits.    On 03/11, noted to have improvement in heart rate, blood pressure, dopamine decreased to 2.5 mics per kg per minute, eventually weaned off, heart rate, blood pressure is stayed stable.  Cardiology recommended to continue to hold Coreg. Patient was deemed stable for downgrade ICU team, hospital medicine consulted to assume care.    MRI brain, MRA head and neck did not show acute findings/stenosis/occlusion. Subsequently after imaging, amlodipine was added, creatinine improved, Entresto added.  Patient stated that she develops nausea with severe headache while on clonidine, hence not on it at home prior to admission.  Has been evaluated by Neurology on 03/12, recommended to control ortho stats, correct electrolytes as needed, PT OT, delirium/fall precautions.TANNER resolved. She is concerned about a lump she found in upper portion of her right breast. She states she forgot to mention it to Dr. Arias- recommend OP US breast    Having issues with supine HTN, severe orthostatic hypotension. Cardiology reconsulted to eval 03/14.           Interval History: NAEON. Pt reports persistent headache and gen weakness today. Denies CP, SOB, N/V, abd pain. Appetite is poor     Review of Systems  Objective:     Vital Signs (Most Recent):  Temp: 98.2 °F (36.8 °C) (03/13/23 1200)  Pulse: 78 (03/13/23 1530)  Resp: (!) 52 (03/13/23 1530)  BP: (!) 188/89 (03/13/23 1500)  SpO2: 98 % (03/13/23 1530)   Vital Signs (24h " Range):  Temp:  [98 °F (36.7 °C)-98.2 °F (36.8 °C)] 98.2 °F (36.8 °C)  Pulse:  [] 78  Resp:  [10-53] 52  SpO2:  [94 %-100 %] 98 %  BP: ()/() 188/89     Weight: 135.7 kg (299 lb 2.6 oz)  Body mass index is 44.18 kg/m².    Intake/Output Summary (Last 24 hours) at 3/13/2023 1546  Last data filed at 3/13/2023 1300  Gross per 24 hour   Intake 511.87 ml   Output 1735 ml   Net -1223.13 ml      Physical Exam  Vitals and nursing note reviewed.   Constitutional:       General: She is not in acute distress.     Appearance: She is obese. She is not ill-appearing.   Cardiovascular:      Rate and Rhythm: Normal rate and regular rhythm.      Heart sounds: No murmur heard.    No friction rub. No gallop.   Pulmonary:      Effort: Pulmonary effort is normal.      Breath sounds: Normal breath sounds. No wheezing, rhonchi or rales.      Comments: On room air   Abdominal:      General: Bowel sounds are normal. There is no distension.      Palpations: Abdomen is soft.      Tenderness: There is no abdominal tenderness. There is no guarding or rebound.   Genitourinary:     Comments: Puckett in place draining clear yellow urine   Musculoskeletal:      Right lower leg: No edema.      Left lower leg: No edema.   Neurological:      General: No focal deficit present.      Mental Status: She is alert and oriented to person, place, and time. Mental status is at baseline.   Psychiatric:         Mood and Affect: Mood normal.         Behavior: Behavior normal.       Significant Labs: All pertinent labs within the past 24 hours have been reviewed.    Significant Imaging: I have reviewed all pertinent imaging results/findings within the past 24 hours.      Assessment/Plan:      * Hypertension   - Cardiology reconsulted   - Continue Norvasc, lasix, entresto (dose increased by cardiology)   - monitor BP   - IV PRN meds available   - Clonidine held due to HA, Coreg held due to bradycardia     Orthostatic hypotension  Having issues with  orthostatic dizziness but supine hypertension, cardiology reconsulted- discussed with NP   Neurology consulted on admission  MRI/MRA Head/Neck with no acute findings   TTE with concentric LVH, EF 65%, normal diastolic function   Continue SCDs/MONIKA hose  Monitor orthostatics     TANNER (acute kidney injury)  Resolved, likely prerenal vs. Due to elevated BP   Monitor BMP   Renally dose meds; avoid nephrotoxic agents     Bradycardia  Likely due to vagal mediated, recent neg stress test. Resolved   Cardiology on board, recommended to hold Coreg/AV jena blocking medications, Recommend 1 week  Holter as outpatient   Tele monitoring     Chronic diastolic congestive heart failure  Patient is identified as having Diastolic (HFpEF) heart failure that is Chronic. CHF is currently controlled. Latest ECHO performed and demonstrates- Results for orders placed during the hospital encounter of 02/15/22    Echo    Interpretation Summary  · The left ventricle is normal in size with concentric hypertrophy and normal systolic function.  · The estimated ejection fraction is 60%.  · Normal left ventricular diastolic function.  · Normal right ventricular size with normal right ventricular systolic function.  · Mild to moderate tricuspid regurgitation.  · Normal central venous pressure (3 mmHg).  · The estimated PA systolic pressure is 36 mmHg.  . Continue Furosemide and monitor clinical status closely. Monitor on telemetry. Patient is off CHF pathway.  Monitor strict Is&Os and daily weights.  Place on fluid restriction of 1.5 L. Continue to stress to patient importance of self efficacy and  on diet for CHF. Last BNP reviewed- and noted below   Recent Labs   Lab 03/09/23  1508   BNP 29   Cardiology reconsulted   Continue home Entresto and diuretics, appears euvolemic at this time   B blocker on hold due to bradycardia   Strict Is and Os    Urinary retention  - atkins placed 3/11 due to urinary retention   -hold ditropan   - voiding  trial once pt is able to ambulate with PT     History of pulmonary embolism  - continue Eliquis     Headache  MRI MRA head neck with no acute pathology   PRN Fioricet     Malignant neoplasm of upper-outer quadrant of right breast in female, estrogen receptor positive  Followed by oncology   Continue Femara   new lump felt by patient, unable to perform breast imaging as inpt  Recommend outpatient evaluation with oncology on discharge     NILS (Obstructive sleep apnea)  Continue CPAP nightly        VTE Risk Mitigation (From admission, onward)         Ordered     apixaban tablet 5 mg  2 times daily         03/09/23 1624     Reason for No Pharmacological VTE Prophylaxis  Once        Question:  Reasons:  Answer:  Already adequately anticoagulated on oral Anticoagulants    03/09/23 1620     IP VTE HIGH RISK PATIENT  Once         03/09/23 1620     Place sequential compression device  Until discontinued         03/09/23 1620                Discharge Planning   MARTA:      Code Status: Full Code   Is the patient medically ready for discharge?:     Reason for patient still in hospital (select all that apply): Patient trending condition, Treatment, Consult recommendations and PT / OT recommendations  Discharge Plan A: Home, Home with family          Leana Ewing MD  Department of Hospital Medicine   [facility]

## 2023-03-14 NOTE — PT/OT/SLP PROGRESS
"Occupational Therapy   Treatment    Name: Susu Luther  MRN: [id number]  Admitting Diagnosis:  Hypertension       Recommendations:     Discharge Recommendations: nursing facility, skilled, home health OT (vs)  Discharge Equipment Recommendations:  walker, rolling, shower chair  Barriers to discharge:  None    Assessment:     Susu Luther is a 73 y.o. female with a medical diagnosis of Hypertension.  She presents with the following performance deficits affecting function are weakness, impaired endurance, impaired self care skills, impaired functional mobility, impaired balance, impaired cardiopulmonary response to activity, decreased safety awareness, edema, decreased coordination, impaired sensation.     Rehab Prognosis:  Good; patient would benefit from acute skilled OT services to address these deficits and reach maximum level of function.       Plan:     Patient to be seen 2 x/week to address the above listed problems via self-care/home management, therapeutic exercises, therapeutic activities  Plan of Care Expires: 03/27/23  Plan of Care Reviewed with: patient    Subjective   Patient reported "My head is spinning and my legs are numb" Patient reported her dizziness remained unchanged throughout the session.  Pain/Comfort:  Pain Rating 1: 0/10    Objective:     Communicated with: NurseMara, prior to session.  Patient found supine with peripheral IV, pulse ox (continuous), telemetry, bed alarm, blood pressure cuff, atkins catheter upon OT entry to room.    General Precautions: Standard, fall    Orthopedic Precautions:N/A  Braces: N/A  Respiratory Status: Room air     Occupational Performance:     Bed Mobility:    Patient completed Supine to Sit with stand by assistance with v/c for hand placement.  Patient sat EOB x 5 min to improve sitting balance for ADL completion. Therapist provided education on pacing and breathing techniques for energy conservation.    Functional Mobility/Transfers:  Patient " completed Sit <> Stand Transfer with contact guard assistance  with  no assistive device. Patient was unsteady upon initial standing and self-corrected posture.   Patient completed Bed <> Chair Transfer using Stand Pivot technique with contact guard assistance with no assistive device  Functional Mobility: Functional mobility not completed not appropriate at this time. Will progress once deemed safe.     [facility] 6 Click ADL: 15    Treatment & Education:  Patient tolerated treatment fair due to dizziness, fatigue, and low BP.   Patient completed B UE AROM therex while seated in bedside chair: bicep curls x 10 reps, shoulder flexion x 10 reps, and composite fist x 10 reps to maintain functional strength for ADL completion.  Patient with improved symptomatic orthostatic hypotension this date. Patient's BP was 172/84 upon entry, 151/92 while supine, 135/76 seated EOB, 130/77 after seated EOB x 5 min, 114/68 after transfer to chair, 121/72 after seated in bedside chair x 5 min, and 108/58 upon exit. RN aware.     Patient left up in chair with all lines intact, call button in reach, and chair alarm on    GOALS:   Multidisciplinary Problems       Occupational Therapy Goals          Problem: Occupational Therapy    Goal Priority Disciplines Outcome Interventions   Occupational Therapy Goal     OT, PT/OT     Description: Goals to be met by: 3/27/23     Patient will increase functional independence with ADLs by performing:    UE Dressing with [place].  Toileting from toilet with Supervision for hygiene and clothing management.   Toilet transfer to toilet with Supervision.                       Time Tracking:     OT Date of Treatment: 03/14/23  OT Start Time: 1045  OT Stop Time: 1110  OT Total Time (min): 25 min    Billable Minutes:Therapeutic Activity 15  Therapeutic Exercise 10        DEMI Morgan         3/14/2023

## 2023-03-14 NOTE — PT/OT/SLP PROGRESS
Physical Therapy Treatment    Patient Name:  Susu Luther   MRN:  [id number]    Recommendations:     Discharge Recommendations: nursing facility, skilled, home health PT (pending pt progress)  Discharge Equipment Recommendations: walker, rolling, shower chair  Barriers to discharge: None    Assessment:     Susu Ltuher is a 73 y.o. female admitted with a medical diagnosis of Hypertension.  She presents with the following impairments/functional limitations: weakness, impaired endurance, impaired functional mobility, gait instability, impaired balance, pain, decreased safety awareness, decreased lower extremity function, decreased coordination, impaired cardiopulmonary response to activity.    Rehab Prognosis: Good; patient would benefit from acute skilled PT services to address these deficits and reach maximum level of function.    Recent Surgery: * No surgery found *      Plan:     During this hospitalization, patient to be seen 3 x/week to address the identified rehab impairments via gait training, therapeutic activities, therapeutic exercises and progress toward the following goals:    Plan of Care Expires:  03/27/23    Subjective     Chief Complaint: Pt is motivated to participate. Pt reports he head is spinning, continued headache, and sensation changes in B LE, nurse aware of symptoms.  Patient/Family Comments/goals:  Pain/Comfort:  Pain Rating 1: 5/10  Location - Side 1: Bilateral  Location - Orientation 1: generalized  Location 1: head (headache)  Pain Addressed 1: Nurse notified    Objective:     Communicated with nurse Terry prior to session.  Patient found supine with peripheral IV, atkins catheter, telemetry, pulse ox (continuous), bed alarm, blood pressure cuff upon PT entry to room.     General Precautions: Standard, fall  Orthopedic Precautions: N/A  Braces: N/A  Respiratory Status: Room air     Functional Mobility:  Bed Mobility:     Rolling Right: stand by assistance  Scooting: stand by  assistance  Supine to Sit: stand by assistance  Transfers:     Sit to Stand:  contact guard assistance with no AD  Bed to Chair: contact guard assistance with  no AD  using  Stand Pivot  Gait: Pt took 3-4 small steps to pivot from bed to chair, gait not progressed due to pt reporting dizziness and fatigue, dizziness improved once sitting in chair  Balance: Demonstrated good sitting balance, fair dynamic balance during gait.  Blood pressure  Initial supine: 151/92  Initial sittin/76  Sitting 130/77  Sitting after transfer: 114/68  Sittin/72  Sitting after TherEx: 108/58  Nurse aware    AM-PAC 6 CLICK MOBILITY  Turning over in bed (including adjusting bedclothes, sheets and blankets)?: 4  Sitting down on and standing up from a chair with arms (e.g., wheelchair, bedside commode, etc.): 3  Moving from lying on back to sitting on the side of the bed?: 4  Moving to and from a bed to a chair (including a wheelchair)?: 3  Need to walk in hospital room?: 1  Climbing 3-5 steps with a railing?: 1  Basic Mobility Total Score: 16     Treatment & Education:  Pt tolerated interventions fair. Completed seated marches, LAQ, ham curls, ankle pumps x10 ea. Reviewed importance of OOB activities and HEP (hip flex, LAQ, ham curls, ankle pumps) in order to maintain/regain strength. Reviewed increased risk of falling due to weakness, instructed to utilize call bell for assistance for all transfers. Pt agreeable to all requests.    Patient left up in chair with all lines intact, call button in reach, and chair alarm on..    GOALS:   Multidisciplinary Problems       Physical Therapy Goals          Problem: Physical Therapy    Goal Priority Disciplines Outcome Goal Variances Interventions   Physical Therapy Goal     PT, PT/OT Ongoing, Progressing     Description: Goals to be met by 3/27/23  Pt will complete bed mobility MOD I.  Pt will complete sit to stand MOD I.  Pt will ambulate 150ft MOD I.                       Time Tracking:      PT Received On: 03/14/23  PT Start Time: 1010     PT Stop Time: 1035  PT Total Time (min): 25 min     Billable Minutes: Therapeutic Activity 15min and Therapeutic Exercise 10min    Treatment Type: Treatment  PT/PTA: PT     Number of PTA visits since last PT visit: 0     03/14/2023

## 2023-03-14 NOTE — PROGRESS NOTES
[facility] ([facility])  Cardiology  Progress Note    Patient Name: Susu Luther  MRN: [id number]  Admission Date: 3/9/2023  Hospital Length of Stay: 3 days  Code Status: Full Code   Attending Physician: Leana Ewing MD   Primary Care Physician: Peace Arias MD  Expected Discharge Date:   Principal Problem:Hypertension    Subjective:   Ms. Luther is a 74y/o AA female with PMHx of diastolic HF, CVA, HLD, breast CA, hx of recurrent PE, AAA s/p repair, HTN, anxiety,  TANNER, NILS, obesity who presented to [facility] ED c/o dizziness and elevated home BP readings, she also felt like she was going to pass out. Cardiology consulted for symptomatic bradycardia. Pt seen and examined today. She went to her PCP prior and had a near syncopal episode. Today she had a possible bradycardia episode, tele monitor room reported 47 lowest rate. Labs reviewed, Crt 1.6, K 4.1, cardiac enzymes negative. CT head negative for any acute changes, Echo Jan 23' EF nml, Nuclear stress Jan 23' negative  Hospital Course:   3/11/23: pt seen and examined this am in ICU.  Off dopamine gtt at time of visit.  HR was normal at bedside. BP high. Has headache.  Seems to have some brief vagally mediated bradycardia at times. Coreg on hold.    3/14/23- Pt seen and examined today, asked re-eval for blood pressure issues. Labs reviewed, BP 180s/100s, HR 60s           Review of Systems   Constitutional: Negative.   HENT: Negative.     Eyes: Negative.    Cardiovascular: Negative.    Respiratory: Negative.     Skin: Negative.    Musculoskeletal: Negative.    Gastrointestinal: Negative.    Genitourinary: Negative.    Neurological:  Positive for headaches.   Psychiatric/Behavioral: Negative.     Objective:     Vital Signs (Most Recent):  Temp: 98.2 °F (36.8 °C) (03/14/23 0400)  Pulse: 98 (03/14/23 0645)  Resp: (!) 33 (03/14/23 0400)  BP: 129/79 (03/14/23 0600)  SpO2: 99 % (03/14/23 0400)   Vital Signs (24h Range):  Temp:  [98 °F (36.7 °C)-98.6  °F (37 °C)] 98.2 °F (36.8 °C)  Pulse:  [] 98  Resp:  [11-52] 33  SpO2:  [94 %-100 %] 99 %  BP: (120-205)/() 129/79     Weight: 135.7 kg (299 lb 2.6 oz)  Body mass index is 44.18 kg/m².     SpO2: 99 %         Intake/Output Summary (Last 24 hours) at 3/14/2023 0939  Last data filed at 3/14/2023 0300  Gross per 24 hour   Intake 511.87 ml   Output 640 ml   Net -128.13 ml       Lines/Drains/Airways       Drain  Duration                  Closed/Suction Drain 03/02/21 1625 Right Breast Bulb 19 Fr. 741 days         Urethral Catheter 03/11/23 0330 Non-latex 16 Fr. 3 days              Peripheral Intravenous Line  Duration                  Peripheral IV - Single Lumen 03/09/23 1600 20 G Anterior;Left Antecubital 4 days                    Physical Exam  Vitals and nursing note reviewed.   Constitutional:       Appearance: Normal appearance. She is obese.   HENT:      Head: Normocephalic.   Eyes:      Pupils: Pupils are equal, round, and reactive to light.   Cardiovascular:      Rate and Rhythm: Normal rate and regular rhythm.      Heart sounds: Normal heart sounds, S1 normal and S2 normal. No murmur heard.    No S3 or S4 sounds.   Pulmonary:      Effort: Pulmonary effort is normal.      Breath sounds: Normal breath sounds.   Abdominal:      General: Bowel sounds are normal.      Palpations: Abdomen is soft.   Musculoskeletal:         General: Normal range of motion.      Cervical back: Normal range of motion.   Skin:     Capillary Refill: Capillary refill takes less than 2 seconds.   Neurological:      General: No focal deficit present.      Mental Status: She is alert and oriented to person, place, and time.   Psychiatric:         Mood and Affect: Mood normal.         Behavior: Behavior normal.         Thought Content: Thought content normal.       Significant Labs: BMP:   Recent Labs   Lab 03/13/23  0443 03/14/23  0421   * 113*    141   K 3.8 4.5    108   CO2 22* 20*   BUN 9 12   CREATININE 0.9  1.0   CALCIUM 9.4 10.0   MG 1.6 1.7   , CMP   Recent Labs   Lab 03/13/23  0443 03/14/23  0421    141   K 3.8 4.5    108   CO2 22* 20*   * 113*   BUN 9 12   CREATININE 0.9 1.0   CALCIUM 9.4 10.0   ANIONGAP 11 13   , CBC   Recent Labs   Lab 03/13/23  0443   WBC 9.09   HGB 12.7   HCT 40.4      , INR No results for input(s): INR, PROTIME in the last 48 hours., Lipid Panel No results for input(s): CHOL, HDL, LDLCALC, TRIG, CHOLHDL in the last 48 hours., Troponin No results for input(s): TROPONINI in the last 48 hours., and All pertinent lab results from the last 24 hours have been reviewed.    Significant Imaging: Cardiac Cath: reviewed, Echocardiogram: Transthoracic echo (TTE) complete (Cupid Only):   Results for orders placed or performed during the hospital encounter of 03/09/23   Echo   Result Value Ref Range    BSA 2.58 m2    TDI SEPTAL 0.07 m/s    LV LATERAL E/E' RATIO 5.89 m/s    LV SEPTAL E/E' RATIO 7.57 m/s    LA WIDTH 4.20 cm    IVC diameter 1.5 cm    Left Ventricular Outflow Tract Mean Velocity 1.02 cm/s    Left Ventricular Outflow Tract Mean Gradient 4.30 mmHg    TV mean gradient 19 mmHg    TDI LATERAL 0.09 m/s    LVIDd 4.41 3.5 - 6.0 cm    IVS 1.60 (A) 0.6 - 1.1 cm    Posterior Wall 1.49 (A) 0.6 - 1.1 cm    Ao root annulus 3.15 cm    LVIDs 2.57 2.1 - 4.0 cm    FS 42 28 - 44 %    LA volume 67.89 cm3    STJ 2.94 cm    Ascending aorta 2.67 cm    LV mass 280.24 g    LA size 3.64 cm    TAPSE 2.00 cm    Left Ventricle Relative Wall Thickness 0.68 cm    AV mean gradient 6 mmHg    AV valve area 2.86 cm2    AV Velocity Ratio 0.79     AV index (prosthetic) 0.91     MV valve area p 1/2 method 2.79 cm2    E/A ratio 0.82     Mean e' 0.08 m/s    E wave deceleration time 272.20 msec    IVRT 76.12 msec    LVOT diameter 2.00 cm    LVOT area 3.1 cm2    LVOT peak josé 1.19 m/s    LVOT peak VTI 28.80 cm    Ao peak josé 1.50 m/s    Ao VTI 31.6 cm    RVOT peak josé 1.04 m/s    RVOT peak VTI 25.1 cm    LVOT  stroke volume 90.43 cm3    AV peak gradient 9 mmHg    PV mean gradient 2.75 mmHg    E/E' ratio 6.63 m/s    MV Peak E Henry 0.53 m/s    TR Max Henry 2.60 m/s    MV stenosis pressure 1/2 time 78.94 ms    MV Peak A Henry 0.65 m/s    LV Systolic Volume 23.99 mL    LV Systolic Volume Index 9.8 mL/m2    LV Diastolic Volume 87.94 mL    LV Diastolic Volume Index 35.75 mL/m2    LA Volume Index 27.6 mL/m2    LV Mass Index 114 g/m2    RA Major Axis 3.89 cm    Left Atrium Minor Axis 5.40 cm    Left Atrium Major Axis 5.06 cm    Triscuspid Valve Regurgitation Peak Gradient 27 mmHg    RA Width 3.00 cm    Right Atrial Pressure (from IVC) 3 mmHg    EF 65 %    TV rest pulmonary artery pressure 30 mmHg    Narrative    · The left ventricle is normal in size with concentric hypertrophy and   normal systolic function.  · The estimated ejection fraction is 65%.  · Normal left ventricular diastolic function.  · Normal right ventricular size with normal right ventricular systolic   function.  · Normal central venous pressure (3 mmHg).  · The estimated PA systolic pressure is 30 mmHg.      , EKG: reviewed, Stress Test: reviewed, and X-Ray: CXR: X-Ray Chest 1 View (CXR): No results found for this visit on 03/09/23.    Assessment and Plan:         * Hypertension  Hold Coreg for symptomatic bradycardia  PRN Hydralazine, titrate meds as needed    3/14/23  Increased entresto  Cont Amlodipine  PRN hydralazine/clonidine  Assess response    Bradycardia  Possible bradycardia episode, tele room noted 47 lowest HR  Hold A-V jena blocking agents  Transfer to ICU, ok to use dopamine  Assess response    3/14/23  HR 60s  Cont current medications    Chronic diastolic congestive heart failure  Echo EF nml  BNP negative  Hold BB, for possible bradycardia episode  Cont medical management    3/14/23  Increased entresto  Cont lasix        History of pulmonary embolism  Eliquis    TANNER (acute kidney injury)  Management per     Headache  BP control, management per      3/14/23  Increased entresto    Morbid obesity with BMI of 45.0-49.9, adult  Weight loss    NILS (obstructive sleep apnea)  CPAP        VTE Risk Mitigation (From admission, onward)         Ordered     apixaban tablet 5 mg  2 times daily         03/09/23 1624     Reason for No Pharmacological VTE Prophylaxis  Once        Question:  Reasons:  Answer:  Already adequately anticoagulated on oral Anticoagulants    03/09/23 1620     IP VTE HIGH RISK PATIENT  Once         03/09/23 1620     Place sequential compression device  Until discontinued         03/09/23 1620                Alexus Rios, NP  Cardiology  [facility] ([facility]

## 2023-03-14 NOTE — PLAN OF CARE
Problem: Adult Inpatient Plan of Care  Goal: Plan of Care Review  Outcome: Ongoing, Progressing  Goal: Patient-Specific Goal (Individualized)  Outcome: Ongoing, Progressing  Goal: Absence of Hospital-Acquired Illness or Injury  Outcome: Ongoing, Progressing  Goal: Optimal Comfort and Wellbeing  Outcome: Ongoing, Progressing     Problem: Bariatric Environmental Safety  Goal: Safety Maintained with Care  Outcome: Ongoing, Progressing     Problem: Fluid and Electrolyte Imbalance (Acute Kidney Injury/Impairment)  Goal: Fluid and Electrolyte Balance  Outcome: Ongoing, Progressing     Problem: Oral Intake Inadequate (Acute Kidney Injury/Impairment)  Goal: Optimal Nutrition Intake  Outcome: Ongoing, Progressing     Problem: Renal Function Impairment (Acute Kidney Injury/Impairment)  Goal: Effective Renal Function  Outcome: Ongoing, Progressing     Problem: Infection  Goal: Absence of Infection Signs and Symptoms  Outcome: Ongoing, Progressing     Problem: Fall Injury Risk  Goal: Absence of Fall and Fall-Related Injury  Outcome: Ongoing, Progressing

## 2023-03-14 NOTE — PLAN OF CARE
[facility] ([facility])  Discharge Reassessment    Primary Care Provider: Peace Arias MD    Expected Discharge Date:     Reassessment (most recent)       Discharge Reassessment - 03/14/23 1445          Discharge Reassessment    Assessment Type Discharge Planning Assessment     Did the patient's condition or plan change since previous assessment? Yes     Discharge Plan discussed with: Patient     Communicated MARTA with patient/caregiver Date not available/Unable to determine     Discharge Plan A Home Health     Discharge Plan B Skilled Nursing Facility     DME Needed Upon Discharge  none     Discharge Barriers Identified None     Why the patient remains in the hospital Requires continued medical care

## 2023-03-14 NOTE — ASSESSMENT & PLAN NOTE
Hold Coreg for symptomatic bradycardia  PRN Hydralazine, titrate meds as needed    3/14/23  Increased entresto  Cont Amlodipine  PRN hydralazine/clonidine  Assess response

## 2023-03-14 NOTE — PLAN OF CARE
Pt tolerated interventions fair. Required SBA for bed mobility, CGA for STS and transfer. Gait not progressed due to pt c/o dizziness and fatigue. Recommending SNF vs HHPT pending pt progress upon d/c.

## 2023-03-14 NOTE — PLAN OF CARE
Patient tolerated OT treatment fair due to symptomatic orthostatic hypotension. Patient c/o of unchanged dizziness throughout. Sup>sit with SBA. Sit>stand with CGA. Recommending SNF vs. HHOT at d/c pending pt progress.

## 2023-03-14 NOTE — SUBJECTIVE & OBJECTIVE
Review of Systems   Constitutional: Negative.   HENT: Negative.     Eyes: Negative.    Cardiovascular: Negative.    Respiratory: Negative.     Skin: Negative.    Musculoskeletal: Negative.    Gastrointestinal: Negative.    Genitourinary: Negative.    Neurological:  Positive for headaches.   Psychiatric/Behavioral: Negative.     Objective:     Vital Signs (Most Recent):  Temp: 98.2 °F (36.8 °C) (03/14/23 0400)  Pulse: 98 (03/14/23 0645)  Resp: (!) 33 (03/14/23 0400)  BP: 129/79 (03/14/23 0600)  SpO2: 99 % (03/14/23 0400)   Vital Signs (24h Range):  Temp:  [98 °F (36.7 °C)-98.6 °F (37 °C)] 98.2 °F (36.8 °C)  Pulse:  [] 98  Resp:  [11-52] 33  SpO2:  [94 %-100 %] 99 %  BP: (120-205)/() 129/79     Weight: 135.7 kg (299 lb 2.6 oz)  Body mass index is 44.18 kg/m².     SpO2: 99 %         Intake/Output Summary (Last 24 hours) at 3/14/2023 0939  Last data filed at 3/14/2023 0300  Gross per 24 hour   Intake 511.87 ml   Output 640 ml   Net -128.13 ml       Lines/Drains/Airways       Drain  Duration                  Closed/Suction Drain 03/02/21 1625 Right Breast Bulb 19 Fr. 741 days         Urethral Catheter 03/11/23 0330 Non-latex 16 Fr. 3 days              Peripheral Intravenous Line  Duration                  Peripheral IV - Single Lumen 03/09/23 1600 20 G Anterior;Left Antecubital 4 days                    Physical Exam  Vitals and nursing note reviewed.   Constitutional:       Appearance: Normal appearance. She is obese.   HENT:      Head: Normocephalic.   Eyes:      Pupils: Pupils are equal, round, and reactive to light.   Cardiovascular:      Rate and Rhythm: Normal rate and regular rhythm.      Heart sounds: Normal heart sounds, S1 normal and S2 normal. No murmur heard.    No S3 or S4 sounds.   Pulmonary:      Effort: Pulmonary effort is normal.      Breath sounds: Normal breath sounds.   Abdominal:      General: Bowel sounds are normal.      Palpations: Abdomen is soft.   Musculoskeletal:          General: Normal range of motion.      Cervical back: Normal range of motion.   Skin:     Capillary Refill: Capillary refill takes less than 2 seconds.   Neurological:      General: No focal deficit present.      Mental Status: She is alert and oriented to person, place, and time.   Psychiatric:         Mood and Affect: Mood normal.         Behavior: Behavior normal.         Thought Content: Thought content normal.       Significant Labs: BMP:   Recent Labs   Lab 03/13/23 0443 03/14/23 0421   * 113*    141   K 3.8 4.5    108   CO2 22* 20*   BUN 9 12   CREATININE 0.9 1.0   CALCIUM 9.4 10.0   MG 1.6 1.7   , CMP   Recent Labs   Lab 03/13/23 0443 03/14/23  0421    141   K 3.8 4.5    108   CO2 22* 20*   * 113*   BUN 9 12   CREATININE 0.9 1.0   CALCIUM 9.4 10.0   ANIONGAP 11 13   , CBC   Recent Labs   Lab 03/13/23 0443   WBC 9.09   HGB 12.7   HCT 40.4      , INR No results for input(s): INR, PROTIME in the last 48 hours., Lipid Panel No results for input(s): CHOL, HDL, LDLCALC, TRIG, CHOLHDL in the last 48 hours., Troponin No results for input(s): TROPONINI in the last 48 hours., and All pertinent lab results from the last 24 hours have been reviewed.    Significant Imaging: Cardiac Cath: reviewed, Echocardiogram: Transthoracic echo (TTE) complete (Cupid Only):   Results for orders placed or performed during the hospital encounter of 03/09/23   Echo   Result Value Ref Range    BSA 2.58 m2    TDI SEPTAL 0.07 m/s    LV LATERAL E/E' RATIO 5.89 m/s    LV SEPTAL E/E' RATIO 7.57 m/s    LA WIDTH 4.20 cm    IVC diameter 1.5 cm    Left Ventricular Outflow Tract Mean Velocity 1.02 cm/s    Left Ventricular Outflow Tract Mean Gradient 4.30 mmHg    TV mean gradient 19 mmHg    TDI LATERAL 0.09 m/s    LVIDd 4.41 3.5 - 6.0 cm    IVS 1.60 (A) 0.6 - 1.1 cm    Posterior Wall 1.49 (A) 0.6 - 1.1 cm    Ao root annulus 3.15 cm    LVIDs 2.57 2.1 - 4.0 cm    FS 42 28 - 44 %    LA volume 67.89 cm3     STJ 2.94 cm    Ascending aorta 2.67 cm    LV mass 280.24 g    LA size 3.64 cm    TAPSE 2.00 cm    Left Ventricle Relative Wall Thickness 0.68 cm    AV mean gradient 6 mmHg    AV valve area 2.86 cm2    AV Velocity Ratio 0.79     AV index (prosthetic) 0.91     MV valve area p 1/2 method 2.79 cm2    E/A ratio 0.82     Mean e' 0.08 m/s    E wave deceleration time 272.20 msec    IVRT 76.12 msec    LVOT diameter 2.00 cm    LVOT area 3.1 cm2    LVOT peak henry 1.19 m/s    LVOT peak VTI 28.80 cm    Ao peak henry 1.50 m/s    Ao VTI 31.6 cm    RVOT peak henry 1.04 m/s    RVOT peak VTI 25.1 cm    LVOT stroke volume 90.43 cm3    AV peak gradient 9 mmHg    PV mean gradient 2.75 mmHg    E/E' ratio 6.63 m/s    MV Peak E Henry 0.53 m/s    TR Max Henry 2.60 m/s    MV stenosis pressure 1/2 time 78.94 ms    MV Peak A Henry 0.65 m/s    LV Systolic Volume 23.99 mL    LV Systolic Volume Index 9.8 mL/m2    LV Diastolic Volume 87.94 mL    LV Diastolic Volume Index 35.75 mL/m2    LA Volume Index 27.6 mL/m2    LV Mass Index 114 g/m2    RA Major Axis 3.89 cm    Left Atrium Minor Axis 5.40 cm    Left Atrium Major Axis 5.06 cm    Triscuspid Valve Regurgitation Peak Gradient 27 mmHg    RA Width 3.00 cm    Right Atrial Pressure (from IVC) 3 mmHg    EF 65 %    TV rest pulmonary artery pressure 30 mmHg    Narrative    · The left ventricle is normal in size with concentric hypertrophy and   normal systolic function.  · The estimated ejection fraction is 65%.  · Normal left ventricular diastolic function.  · Normal right ventricular size with normal right ventricular systolic   function.  · Normal central venous pressure (3 mmHg).  · The estimated PA systolic pressure is 30 mmHg.      , EKG: reviewed, Stress Test: reviewed, and X-Ray: CXR: X-Ray Chest 1 View (CXR): No results found for this visit on 03/09/23.

## 2023-03-14 NOTE — ASSESSMENT & PLAN NOTE
Echo EF nml  BNP negative  Hold BB, for possible bradycardia episode  Cont medical management    3/14/23  Increased entresto  Cont lasix

## 2023-03-14 NOTE — ASSESSMENT & PLAN NOTE
Possible bradycardia episode, tele room noted 47 lowest HR  Hold A-V jena blocking agents  Transfer to ICU, ok to use dopamine  Assess response    3/14/23  HR 60s  Cont current medications

## 2023-03-15 VITALS
RESPIRATION RATE: 22 BRPM | HEART RATE: 75 BPM | BODY MASS INDEX: 43.4 KG/M2 | DIASTOLIC BLOOD PRESSURE: 79 MMHG | TEMPERATURE: 98 F | HEIGHT: 69 IN | OXYGEN SATURATION: 99 % | WEIGHT: 293 LBS | SYSTOLIC BLOOD PRESSURE: 148 MMHG

## 2023-03-15 LAB
ANION GAP SERPL CALC-SCNC: 12 MMOL/L (ref 8–16)
BASOPHILS # BLD AUTO: 0.08 K/UL (ref 0–0.2)
BASOPHILS NFR BLD: 0.7 % (ref 0–1.9)
BUN SERPL-MCNC: 12 MG/DL (ref 8–23)
CALCIUM SERPL-MCNC: 9.3 MG/DL (ref 8.7–10.5)
CHLORIDE SERPL-SCNC: 106 MMOL/L (ref 95–110)
CO2 SERPL-SCNC: 21 MMOL/L (ref 23–29)
CREAT SERPL-MCNC: 1 MG/DL (ref 0.5–1.4)
DIFFERENTIAL METHOD: ABNORMAL
EOSINOPHIL # BLD AUTO: 0.2 K/UL (ref 0–0.5)
EOSINOPHIL NFR BLD: 1.7 % (ref 0–8)
ERYTHROCYTE [DISTWIDTH] IN BLOOD BY AUTOMATED COUNT: 17.3 % (ref 11.5–14.5)
EST. GFR  (NO RACE VARIABLE): >60 ML/MIN/1.73 M^2
GLUCOSE SERPL-MCNC: 115 MG/DL (ref 70–110)
HCT VFR BLD AUTO: 40.6 % (ref 37–48.5)
HGB BLD-MCNC: 13.1 G/DL (ref 12–16)
IMM GRANULOCYTES # BLD AUTO: 0.03 K/UL (ref 0–0.04)
IMM GRANULOCYTES NFR BLD AUTO: 0.3 % (ref 0–0.5)
LYMPHOCYTES # BLD AUTO: 3.2 K/UL (ref 1–4.8)
LYMPHOCYTES NFR BLD: 29 % (ref 18–48)
MAGNESIUM SERPL-MCNC: 1.7 MG/DL (ref 1.6–2.6)
MCH RBC QN AUTO: 25 PG (ref 27–31)
MCHC RBC AUTO-ENTMCNC: 32.3 G/DL (ref 32–36)
MCV RBC AUTO: 78 FL (ref 82–98)
MONOCYTES # BLD AUTO: 0.9 K/UL (ref 0.3–1)
MONOCYTES NFR BLD: 8 % (ref 4–15)
NEUTROPHILS # BLD AUTO: 6.7 K/UL (ref 1.8–7.7)
NEUTROPHILS NFR BLD: 60.3 % (ref 38–73)
NRBC BLD-RTO: 0 /100 WBC
PLATELET # BLD AUTO: 210 K/UL (ref 150–450)
PMV BLD AUTO: 10 FL (ref 9.2–12.9)
POCT GLUCOSE: 139 MG/DL (ref 70–110)
POTASSIUM SERPL-SCNC: 3.9 MMOL/L (ref 3.5–5.1)
RBC # BLD AUTO: 5.23 M/UL (ref 4–5.4)
SODIUM SERPL-SCNC: 139 MMOL/L (ref 136–145)
WBC # BLD AUTO: 11.15 K/UL (ref 3.9–12.7)

## 2023-03-15 PROCEDURE — 25000003 PHARM REV CODE 250: Mod: HCNC | Performed by: STUDENT IN AN ORGANIZED HEALTH CARE EDUCATION/TRAINING PROGRAM

## 2023-03-15 PROCEDURE — 99232 SBSQ HOSP IP/OBS MODERATE 35: CPT | Mod: HCNC,,, | Performed by: INTERNAL MEDICINE

## 2023-03-15 PROCEDURE — 25000003 PHARM REV CODE 250: Mod: HCNC

## 2023-03-15 PROCEDURE — 97530 THERAPEUTIC ACTIVITIES: CPT | Mod: HCNC

## 2023-03-15 PROCEDURE — 25000003 PHARM REV CODE 250: Mod: HCNC | Performed by: INTERNAL MEDICINE

## 2023-03-15 PROCEDURE — 63600175 PHARM REV CODE 636 W HCPCS: Mod: HCNC | Performed by: INTERNAL MEDICINE

## 2023-03-15 PROCEDURE — 80048 BASIC METABOLIC PNL TOTAL CA: CPT | Mod: HCNC | Performed by: NURSE PRACTITIONER

## 2023-03-15 PROCEDURE — 99232 PR SUBSEQUENT HOSPITAL CARE,LEVL II: ICD-10-PCS | Mod: HCNC,,, | Performed by: INTERNAL MEDICINE

## 2023-03-15 PROCEDURE — 85025 COMPLETE CBC W/AUTO DIFF WBC: CPT | Mod: HCNC | Performed by: INTERNAL MEDICINE

## 2023-03-15 PROCEDURE — 83735 ASSAY OF MAGNESIUM: CPT | Mod: HCNC | Performed by: NURSE PRACTITIONER

## 2023-03-15 PROCEDURE — 25000003 PHARM REV CODE 250: Mod: HCNC | Performed by: NURSE PRACTITIONER

## 2023-03-15 PROCEDURE — 36415 COLL VENOUS BLD VENIPUNCTURE: CPT | Mod: HCNC | Performed by: NURSE PRACTITIONER

## 2023-03-15 RX ORDER — AMLODIPINE BESYLATE 10 MG/1
10 TABLET ORAL DAILY
Qty: 30 TABLET | Refills: 0 | Status: SHIPPED | OUTPATIENT
Start: 2023-03-16 | End: 2023-05-08 | Stop reason: SDUPTHER

## 2023-03-15 RX ORDER — MAGNESIUM SULFATE HEPTAHYDRATE 40 MG/ML
2 INJECTION, SOLUTION INTRAVENOUS ONCE
Status: COMPLETED | OUTPATIENT
Start: 2023-03-15 | End: 2023-03-15

## 2023-03-15 RX ADMIN — SACUBITRIL AND VALSARTAN 1 TABLET: 49; 51 TABLET, FILM COATED ORAL at 08:03

## 2023-03-15 RX ADMIN — CHLORHEXIDINE GLUCONATE 0.12% ORAL RINSE 15 ML: 1.2 LIQUID ORAL at 08:03

## 2023-03-15 RX ADMIN — HYDRALAZINE HYDROCHLORIDE 10 MG: 20 INJECTION, SOLUTION INTRAMUSCULAR; INTRAVENOUS at 03:03

## 2023-03-15 RX ADMIN — AMLODIPINE BESYLATE 10 MG: 10 TABLET ORAL at 08:03

## 2023-03-15 RX ADMIN — ALPRAZOLAM 0.5 MG: 0.5 TABLET ORAL at 08:03

## 2023-03-15 RX ADMIN — FAMOTIDINE 20 MG: 20 TABLET ORAL at 08:03

## 2023-03-15 RX ADMIN — FUROSEMIDE 20 MG: 20 TABLET ORAL at 08:03

## 2023-03-15 RX ADMIN — MUPIROCIN: 20 OINTMENT TOPICAL at 08:03

## 2023-03-15 RX ADMIN — CLONIDINE HYDROCHLORIDE 0.1 MG: 0.1 TABLET ORAL at 12:03

## 2023-03-15 RX ADMIN — MAGNESIUM SULFATE HEPTAHYDRATE 2 G: 40 INJECTION, SOLUTION INTRAVENOUS at 08:03

## 2023-03-15 RX ADMIN — LETROZOLE 2.5 MG: 2.5 TABLET, FILM COATED ORAL at 08:03

## 2023-03-15 RX ADMIN — APIXABAN 5 MG: 2.5 TABLET, FILM COATED ORAL at 08:03

## 2023-03-15 RX ADMIN — RANOLAZINE 1000 MG: 500 TABLET, EXTENDED RELEASE ORAL at 08:03

## 2023-03-15 NOTE — SUBJECTIVE & OBJECTIVE
"Interval History: NAEON. Pt reports she feels better today, still reports head "soreness" but no nausea, vomiting. BP continues to drop to 80s systolic with ambulation, await cardiology recommendations prior to discharge. Italo discontinued this AM, pt able to void spontaneously per RN.    Review of Systems  Objective:     Vital Signs (Most Recent):  Temp: 98 °F (36.7 °C) (03/15/23 1130)  Pulse: 86 (03/15/23 1100)  Resp: 20 (03/14/23 2000)  BP: 133/83 (03/15/23 1100)  SpO2: 99 % (03/15/23 1100) Vital Signs (24h Range):  Temp:  [98 °F (36.7 °C)-98.5 °F (36.9 °C)] 98 °F (36.7 °C)  Pulse:  [65-94] 86  Resp:  [20] 20  SpO2:  [94 %-100 %] 99 %  BP: ()/() 133/83     Weight: 133.6 kg (294 lb 8.6 oz)  Body mass index is 43.5 kg/m².    Intake/Output Summary (Last 24 hours) at 3/15/2023 1323  Last data filed at 3/15/2023 0701  Gross per 24 hour   Intake --   Output 2200 ml   Net -2200 ml      Physical Exam  Vitals and nursing note reviewed.   Constitutional:       General: She is not in acute distress.     Appearance: She is obese. She is not ill-appearing.   Cardiovascular:      Rate and Rhythm: Normal rate and regular rhythm.      Heart sounds: Normal heart sounds. No murmur heard.    No friction rub. No gallop.   Pulmonary:      Effort: Pulmonary effort is normal.      Breath sounds: Normal breath sounds. No wheezing, rhonchi or rales.   Abdominal:      General: Bowel sounds are normal. There is no distension.      Palpations: Abdomen is soft.      Tenderness: There is no abdominal tenderness. There is no guarding or rebound.   Musculoskeletal:      Comments: Trace edema to b/l ankles    Neurological:      General: No focal deficit present.      Mental Status: She is alert and oriented to person, place, and time. Mental status is at baseline.       Significant Labs: All pertinent labs within the past 24 hours have been reviewed.    Significant Imaging: I have reviewed all pertinent imaging results/findings " within the past 24 hours.

## 2023-03-15 NOTE — PROGRESS NOTES
[facility]   [facility] Medicine  Progress Note    Patient Name: Susu Luther  MRN: [id number]  Patient Class: IP- Inpatient   Admission Date: 3/9/2023  Length of Stay: 4 days  Attending Physician: Leana Ewing MD  Primary Care Provider: Peace Arias MD        Subjective:     Principal Problem:Hypertension        HPI:  The patient is 72 yo female with past medical history of right breast cancer, hypertension, anxiety, AAA s/p repair, CVA, diastolic heart failure, TANNER, sleep apnea, and obesity who presented to the ED due to elevated blood pressure and dizziness. She reports her systolic blood pressure has been in the 200s at home. She has a headache and is dizzy. She went to her PCP to be evaluated and had  near syncopal episode. Patient is anxious and tearful when discussing her blood pressure. She is concerned about a lump she found in upper portion of her right breast. She states she forgot to mention it to Dr. Arias. Discussed ordering outpatient US as breast US are not usually done while patients are in the hospital. She verbalized understanding. Hospital medicine consulted. Patient noted to have TANNER. Patient placed in observation.      Overview/Hospital Course:  73 year old female with a known past medical history of right breast cancer that is currently in remission, hypertension, anxiety, AAA s/p repair, CVA, diastolic heart failure, TANNER, sleep apnea, PE on Eliquis, and obesity who presented to the ED 3/9 due to elevated blood pressure, headache and dizziness. She reports her systolic blood pressure has been in the 200s at home.  She went to her PCP 3/9 to be evaluated and had a near syncopal episode.  Hospital medicine consulted and placed in observation.  On arrival CT head -ve for acute intracranial abn;      On 3/10, pt was noted to have worsening slurring and stuttering while speaking that has been worsening since overnight; stat repeat CT head obtained that was without acute findings. Vascular  "Neurology was unable to complete assessment due to patient's acute change in condition.. Later in the day patient developed nausea, vomiting, diarrhea, and episodes of bradycardia with heart rate in 30s. Pt also s/o blurry vision and the "room is spinning". Pt was on coreg for BP control that has since been held.  Cardiology was consulted.  Pt moved to the ICU on the afternoon of 3/10 for closer monitoring and has been started on dopamine gtt for HR and BP support. TSH, lactic acid, electrolytes within normal limits.    On 03/11, noted to have improvement in heart rate, blood pressure, dopamine decreased to 2.5 mics per kg per minute, eventually weaned off, heart rate, blood pressure is stayed stable.  Cardiology recommended to continue to hold Coreg. Patient was deemed stable for downgrade ICU team, hospital medicine consulted to assume care.    MRI brain, MRA head and neck did not show acute findings/stenosis/occlusion. Subsequently after imaging, amlodipine was added, creatinine improved, Entresto added.  Patient stated that she develops nausea with severe headache while on clonidine, hence not on it at home prior to admission.  Has been evaluated by Neurology on 03/12, recommended to control ortho stats, correct electrolytes as needed, PT OT, delirium/fall precautions.TNANER resolved. She is concerned about a lump she found in upper portion of her right breast. She states she forgot to mention it to Dr. Arias- recommend OP US breast    03/13: Having issues with supine HTN, severe orthostatic hypotension. Cardiology reconsulted to eval 03/14.           Interval History: NAEON. Pt reports she feels better today, still reports head "soreness" but no nausea, vomiting. BP continues to drop to 80s systolic with ambulation, await cardiology recommendations prior to discharge. Italo discontinued this AM, pt able to void spontaneously per RN.    Review of Systems  Objective:     Vital Signs (Most Recent):  Temp: 98 °F (36.7 " °C) (03/15/23 1130)  Pulse: 86 (03/15/23 1100)  Resp: 20 (03/14/23 2000)  BP: 133/83 (03/15/23 1100)  SpO2: 99 % (03/15/23 1100) Vital Signs (24h Range):  Temp:  [98 °F (36.7 °C)-98.5 °F (36.9 °C)] 98 °F (36.7 °C)  Pulse:  [65-94] 86  Resp:  [20] 20  SpO2:  [94 %-100 %] 99 %  BP: ()/() 133/83     Weight: 133.6 kg (294 lb 8.6 oz)  Body mass index is 43.5 kg/m².    Intake/Output Summary (Last 24 hours) at 3/15/2023 1323  Last data filed at 3/15/2023 0701  Gross per 24 hour   Intake --   Output 2200 ml   Net -2200 ml      Physical Exam  Vitals and nursing note reviewed.   Constitutional:       General: She is not in acute distress.     Appearance: She is obese. She is not ill-appearing.   Cardiovascular:      Rate and Rhythm: Normal rate and regular rhythm.      Heart sounds: Normal heart sounds. No murmur heard.    No friction rub. No gallop.   Pulmonary:      Effort: Pulmonary effort is normal.      Breath sounds: Normal breath sounds. No wheezing, rhonchi or rales.   Abdominal:      General: Bowel sounds are normal. There is no distension.      Palpations: Abdomen is soft.      Tenderness: There is no abdominal tenderness. There is no guarding or rebound.   Musculoskeletal:      Comments: Trace edema to b/l ankles    Neurological:      General: No focal deficit present.      Mental Status: She is alert and oriented to person, place, and time. Mental status is at baseline.       Significant Labs: All pertinent labs within the past 24 hours have been reviewed.    Significant Imaging: I have reviewed all pertinent imaging results/findings within the past 24 hours.      Assessment/Plan:      * Hypertension   - Cardiology reconsulted; appreciate recs   - Continue Norvasc, lasix, entresto (dose increased by cardiology)   - monitor BP   - IV PRN meds available   - Clonidine held due to HA, Coreg held due to bradycardia     Orthostatic hypotension  Having issues with orthostatic dizziness but supine  hypertension, dizziness is limiting patient's mobility and participation with PT   Cardiology following   Neurology consulted on admission  MRI/MRA Head/Neck with no acute findings   TTE with concentric LVH, EF 65%, normal diastolic function   Continue SCDs/MONIKA hose  Monitor orthostatics     TANNER (acute kidney injury)  Resolved, likely prerenal vs. Due to elevated BP   Monitor BMP   Renally dose meds; avoid nephrotoxic agents     Bradycardia  Likely due to vagal mediated, recent neg stress test. Resolved   Cardiology on board, recommended to hold Coreg/AV jena blocking medications, Recommend 1 week  Holter as outpatient   Tele monitoring     Chronic diastolic congestive heart failure  Patient is identified as having Diastolic (HFpEF) heart failure that is Chronic. CHF is currently controlled. Latest ECHO performed and demonstrates- Results for orders placed during the hospital encounter of 02/15/22    Echo    Interpretation Summary  · The left ventricle is normal in size with concentric hypertrophy and normal systolic function.  · The estimated ejection fraction is 60%.  · Normal left ventricular diastolic function.  · Normal right ventricular size with normal right ventricular systolic function.  · Mild to moderate tricuspid regurgitation.  · Normal central venous pressure (3 mmHg).  · The estimated PA systolic pressure is 36 mmHg.  . Continue Furosemide and monitor clinical status closely. Monitor on telemetry. Patient is off CHF pathway.  Monitor strict Is&Os and daily weights.  Place on fluid restriction of 1.5 L. Continue to stress to patient importance of self efficacy and  on diet for CHF. Last BNP reviewed- and noted below   Recent Labs   Lab 03/09/23  1508   BNP 29   Cardiology reconsulted   Continue home Entresto and diuretics, appears euvolemic at this time   B blocker on hold due to bradycardia   Strict Is and Os    Urinary retention  - atkins placed 3/11 due to urinary retention   -Ditropan  discontinued   - atkins discontinued 03/15, pt able to void spontaneously     History of pulmonary embolism  - continue Eliquis     Headache  MRI MRA head neck with no acute pathology   PRN Fioricet     Malignant neoplasm of upper-outer quadrant of right breast in female, estrogen receptor positive  Followed by oncology   Continue Femara   new lump felt by patient, unable to perform breast imaging as inpt  Recommend outpatient evaluation with oncology on discharge     NILS (obstructive sleep apnea)  Continue CPAP nightly        VTE Risk Mitigation (From admission, onward)         Ordered     apixaban tablet 5 mg  2 times daily         03/09/23 1624     Reason for No Pharmacological VTE Prophylaxis  Once        Question:  Reasons:  Answer:  Already adequately anticoagulated on oral Anticoagulants    03/09/23 1620     IP VTE HIGH RISK PATIENT  Once         03/09/23 1620     Place sequential compression device  Until discontinued         03/09/23 1620                Discharge Planning   MARTA:      Code Status: Full Code   Is the patient medically ready for discharge?:     Reason for patient still in hospital (select all that apply): Consult recommendations  Discharge Plan A: Home Health          Leana Ewing MD  Department of Hospital Medicine   [facility]

## 2023-03-15 NOTE — PT/OT/SLP PROGRESS
"Occupational Therapy   Treatment    Name: Susu Luther  MRN: [id number]  Admitting Diagnosis:  Hypertension       Recommendations:     Discharge Recommendations: nursing facility, skilled, home health OT (vs, pending pt progress)  Discharge Equipment Recommendations:  walker, rolling, shower chair  Barriers to discharge:  None    Assessment:     Susu Luther is a 73 y.o. female with a medical diagnosis of Hypertension.  She presents with the following performance deficits affecting function are weakness, impaired endurance, impaired self care skills, impaired functional mobility, impaired balance, decreased safety awareness, impaired cardiopulmonary response to activity, decreased coordination.     Rehab Prognosis:  Fair; patient would benefit from acute skilled OT services to address these deficits and reach maximum level of function.       Plan:     Patient to be seen 2 x/week to address the above listed problems via self-care/home management, therapeutic activities, therapeutic exercises  Plan of Care Expires: 03/27/23  Plan of Care Reviewed with: patient, spouse    Subjective   Patient reported dizziness and "feeling hot, but I'll push through" when sitting EOB. Patient reported her dizziness went away after she transferred to the chair.   Pain/Comfort:  Pain Rating 1: 0/10    Objective:     Communicated with: NurseMara, prior to session.  Patient found supine with peripheral IV, pulse ox (continuous), telemetry, blood pressure cuff, bed alarm upon OT entry to room.    General Precautions: Standard, fall    Orthopedic Precautions:N/A  Braces: N/A  Respiratory Status: Room air     Occupational Performance:     Bed Mobility:    Patient completed Supine to Sit with contact guard assistance with v/c for hand placement.     Functional Mobility/Transfers:  Patient completed Sit <> Stand Transfer with contact guard assistance  with  no assistive device   Patient completed Bed <> Chair Transfer using Step " Transfer technique with contact guard assistance with no assistive device  Functional Mobility: Functional mobility not completed due to patient's symptomatic orthostatic hypotension. Transfer to chair only this date. Will progress once deemed safe.    [facility] 6 Click ADL: 15    Treatment & Education:  Patient with symptomatic orthostatic hypotension this date. RN aware and notified MD. Patient very motivated to participate in OT session.  Patient's BPs throughout session below:  -Upon entry: 110/59  -Initial EOB: 94/56  -EOB: 86/50  Patient completed B UE AROM therex (bicep curls and composite fist) to increase BP while seated EOB.  -EOB after B UE therex: 97/58  -Seated in bedside chair: 87/57  -Upon exit: 115/71  Patient educated to remain OOB in bedside chair for at least 2 hours and to call for A upon return to bed.   Patient educated on completion of B UE AROM therex to maintain functional strength for ADL completion.     Patient left up in chair with all lines intact, call button in reach, chair alarm on, and RN notified    GOALS:   Multidisciplinary Problems       Occupational Therapy Goals          Problem: Occupational Therapy    Goal Priority Disciplines Outcome Interventions   Occupational Therapy Goal     OT, PT/OT Ongoing, Progressing    Description: Goals to be met by: 3/27/23     Patient will increase functional independence with ADLs by performing:    UE Dressing with Brooke.  Toileting from toilet with Supervision for hygiene and clothing management.   Toilet transfer to toilet with Supervision.                       Time Tracking:     OT Date of Treatment: 03/15/23  OT Start Time: 0915  OT Stop Time: 0940  OT Total Time (min): 25 min    Billable Minutes:Therapeutic Activity 25        DEMI Morgan         3/15/2023

## 2023-03-15 NOTE — ASSESSMENT & PLAN NOTE
Followed by oncology   Continue Mason   new lump felt by patient, unable to perform breast imaging as inpatient   Recommend outpatient evaluation with oncology/PCP on discharge for further breast imaging

## 2023-03-15 NOTE — ASSESSMENT & PLAN NOTE
Hold Coreg for symptomatic bradycardia  PRN Hydralazine, titrate meds as needed    3/14/23  Increased entresto  Cont Amlodipine  PRN hydralazine/clonidine  Assess response    3/15/2023  -Continue Entresto, Norvasc  -Assess response

## 2023-03-15 NOTE — PLAN OF CARE
AAOx4, MAEW. NSR on monitor. PRNs given for anxiety and HTN. Afebrile. Puckett in place. Good uop. Pt requests no visitors. Sign to remain on door.

## 2023-03-15 NOTE — DISCHARGE SUMMARY
[facility] - Intensive Care ([facility])  Hospital Medicine  Discharge Summary      Patient Name: Susu Luther  MRN: [id number]  [facility]: [id number]  Patient Class: IP- Inpatient  Admission Date: 3/9/2023  Hospital Length of Stay: 4 days  Discharge Date and Time: 3/15/2023  4:59 PM  Attending Physician: No att. providers found   Discharging Provider: Leana Ewing MD  Primary Care Provider: Peace Arias MD    Primary Care Team: Networked reference to record PCT     HPI:   The patient is 72 yo female with past medical history of right breast cancer, hypertension, anxiety, AAA s/p repair, CVA, diastolic heart failure, TANNER, sleep apnea, and obesity who presented to the ED due to elevated blood pressure and dizziness. She reports her systolic blood pressure has been in the 200s at home. She has a headache and is dizzy. She went to her PCP to be evaluated and had  near syncopal episode. Patient is anxious and tearful when discussing her blood pressure. She is concerned about a lump she found in upper portion of her right breast. She states she forgot to mention it to Dr. Arias. Discussed ordering outpatient US as breast US are not usually done while patients are in the hospital. She verbalized understanding. Hospital medicine consulted. Patient noted to have TANNER. Patient placed in observation.      * No surgery found *      Hospital Course:   73 year old female with a known past medical history of right breast cancer that is currently in remission, hypertension, anxiety, AAA s/p repair, CVA, diastolic heart failure, TANNER, sleep apnea, PE on Eliquis, and obesity who presented to the ED 3/9 due to elevated blood pressure, headache and dizziness. She reports her systolic blood pressure has been in the 200s at home.  She went to her PCP 3/9 to be evaluated and had a near syncopal episode.  Hospital medicine consulted and placed in observation.  On arrival CT head -ve for acute intracranial abn;      On 3/10, pt was noted to  "have worsening slurring and stuttering while speaking that has been worsening since overnight; stat repeat CT head obtained that was without acute findings. Vascular Neurology was unable to complete assessment due to patient's acute change in condition.. Later in the day patient developed nausea, vomiting, diarrhea, and episodes of bradycardia with heart rate in 30s. Pt also s/o blurry vision and the "room is spinning". Pt was on coreg for BP control that has since been held.  Cardiology was consulted.  Pt moved to the ICU on the afternoon of 3/10 for closer monitoring and has been started on dopamine gtt for HR and BP support. TSH, lactic acid, electrolytes within normal limits.    On 03/11, noted to have improvement in heart rate, blood pressure, dopamine decreased to 2.5 mics per kg per minute, eventually weaned off, heart rate, blood pressure is stayed stable.  Cardiology recommended to continue to hold Coreg. Patient was deemed stable for downgrade ICU team, hospital medicine consulted to assume care.    MRI brain, MRA head and neck did not show acute findings/stenosis/occlusion. Subsequently after imaging, amlodipine was added, creatinine improved, Entresto added.  Patient stated that she develops nausea with severe headache while on clonidine, hence not on it at home prior to admission.  Has been evaluated by Neurology on 03/12, recommended to control ortho stats, correct electrolytes as needed, PT OT, delirium/fall precautions.TANNER resolved. She is concerned about a lump she found in upper portion of her right breast. She states she forgot to mention it to Dr. Arias- recommend OP US breast    03/13: Having issues with supine HTN, severe orthostatic hypotension. Cardiology reconsulted to eval 03/14.         Pt seen and examined on day of discharge. Cardiology evaluated and recommend thigh high stockings/SCDs to support venous return, continue current medication regimen. She reports she feels better, was able to " ambulate to use bathroom without dizziness, BP has maintained 130-140 systolic. Pt advised to keep log of BP at home until she can follow up with PCP and cardiology for further management. Return precautions, follow up instructions and instructions re: slow positional changes discussed with patient and daughter at length, they expressed understanding. All questions answered to their satisfaction. Pt will followup with PCP and cardiology within 1 week. She was set up with HH and DME prior to discharge. [facility] referral placed.        Goals of Care Treatment Preferences:  Code Status: Full Code    Health care agent:  Campos Luther [phone], 2: daughter Darío Kay [phone], 3: daughter Deepti Luther [phone]  Health care agent number: No value filed.                   Consults:   Consults (From admission, onward)        Status Ordering Provider     Inpatient consult to Social Work  Once        Provider:  (Not yet assigned)    Completed SUMANTH DAVIS     Inpatient consult to Ophthalmology  Once        Provider:  (Not yet assigned)    Acknowledged POP LAMB     Inpatient consult to Telemedicine-General Neurology  Once        Provider:  (Not yet assigned)    Completed POP LAMB     Inpatient consult to Critical Care Medicine  Once        Provider:  (Not yet assigned)    Completed POP LAMB     Inpatient consult to Cardiology  Once        Provider:  (Not yet assigned)    Completed POP LAMB     Inpatient Consult Tele-Vascular Neurology  Once        Provider:  (Not yet assigned)    Completed POP LAMB          Cardiac/Vascular  * Hypertension   - Cardiology consulted   - Continue Norvasc, lasix, entresto (dose increased by cardiology), Clonidine as needed  - Coreg held due to bradycardia   - monitor BP   - follow up PCP within 3-5 days for further management       Bradycardia  Likely due to vagal mediated, recent  neg stress test.  Resolved   Cardiology on board, recommended to hold Coreg/AV jena blocking medications, Recommend 1 week  Holter as outpatient   F/u cardiology as above                  Orthostatic hypotension  Having issues with orthostatic dizziness but supine hypertension  Neurology consulted on admission- MRI/MRA Head/Neck with no acute findings   Cardiology following -recommend compression stockings to b/l hips to increase venous return, OP visit with exercise  to discuss autonomic exercises to improve vascular tone  TTE with concentric LVH, EF 65%, normal diastolic function   Continue SCDs/MONIKA hose  Follow up with PCP and cardiology on discharge     Renal/  Urinary retention  - atkins placed 3/11 due to urinary retention   -Ditropan discontinued   - atkins discontinued 03/15, pt able to void spontaneously     Oncology  Malignant neoplasm of upper-outer quadrant of right breast in female, estrogen receptor positive  Followed by oncology   Continue Femara   new lump felt by patient, unable to perform breast imaging as inpatient   Recommend outpatient evaluation with oncology/PCP on discharge for further breast imaging         Final Active Diagnoses:    Diagnosis Date Noted POA    PRINCIPAL PROBLEM:  Hypertension [I10]  Yes    Orthostatic hypotension [I95.1] 03/10/2023 Yes    TANNER (acute kidney injury) [N17.9] 02/15/2022 Yes    Bradycardia [R00.1] 03/10/2023 No    Chronic diastolic congestive heart failure [I50.32] 01/15/2023 Yes    Urinary retention [R33.9] 03/13/2023 Yes    History of pulmonary embolism [Z86.711] 04/28/2022 Yes    Headache [R51.9] 08/08/2019 Yes    Morbid obesity with BMI of 45.0-49.9, adult [E66.01, Z68.42] 06/28/2019 Not Applicable    Malignant neoplasm of upper-outer quadrant of right breast in female, estrogen receptor positive [C50.411, Z17.0] 03/14/2019 Not Applicable     Chronic    NILS (obstructive sleep apnea) [G47.33] 11/28/2018 Yes     Chronic      Problems  "Resolved During this Admission:    Diagnosis Date Noted Date Resolved POA    Dizziness [R42] 03/12/2023 03/13/2023 Yes    Alteration in speech [R47.89] 03/10/2023 03/13/2023 Yes       Discharged Condition: good    Disposition: Home or Self Care    Follow Up:   Follow-up Information     Peace Arias MD Follow up in 2 day(s).    Specialty: Internal Medicine  Why: call clinic to schedule appointment ASAP, f/u BP and breast imaging  Contact information:  [address] [place] [address]  [phone]             Arlene Hobbs MD. Schedule an appointment as soon as possible for a visit in 1 week(s).    Specialty: Cardiology  Why: need to follow up BP, dizziness. Needs holter monitor per cardiology  Contact information:  [address]  [place] [address]  [phone]             Raul Boyd OD Follow up.    Specialty: Optometry  Why: follow up as prev scheduled on 04/05/23  Contact information:  [address]  [place] [address]  [phone]                       Patient Instructions:      COMMODE FOR HOME USE     Order Specific Question Answer Comments   Type: Standard    Height: 5' 9" (1.753 m)    Weight: 133.6 kg (294 lb 8.6 oz)    Does patient have medical equipment at home? cane, quad    Does patient have medical equipment at home? grab bar    Does patient have medical equipment at home? rollator    Length of need (1-99 months): 6      COMPRESSION STOCKINGS     Order Specific Question Answer Comments   Pressure amount: 20-30 mmHg      Ambulatory referral/consult to Home Health   Standing Status: Future   Referral Priority: Routine Referral Type: Home Health   Referral Reason: Specialty Services Required   Requested Specialty: Home Health Services   Number of Visits Requested: 1     Ambulatory referral/consult to [facility]   Standing Status: Future   Referral Priority: Routine Referral Type: Consultation   Referral Reason: Specialty " Services Required   Number of Visits Requested: 1     Ambulatory referral/consult to [facility]   Standing Status: Future   Referral Priority: Routine Referral Type: Consultation   Referral Reason: Specialty Services Required   Requested Specialty: Cardiology   Number of Visits Requested: 1     Diet Cardiac     No driving until:   Order Comments: Cleared by PCP or cardiology     Notify your health care provider if you experience any of the following:  persistent dizziness, light-headedness, or visual disturbances     Activity as tolerated       Significant Diagnostic Studies: See Hospital Course     Pending Diagnostic Studies:     None         Medications:  Reconciled Home Medications:      Medication List      START taking these medications    amLODIPine 10 MG tablet  Commonly known as: NORVASC  Take 1 tablet (10 mg total) by mouth once daily.  Start taking on: March 16, 2023     ENTRESTO 49-51 mg per tablet  Generic drug: sacubitriL-valsartan  Take 1 tablet by mouth 2 (two) times daily.  Replaces: ENTRESTO 24-26 mg per tablet        CONTINUE taking these medications    ALPRAZolam 0.5 MG tablet  Commonly known as: XANAX  Take 1 tablet (0.5 mg total) by mouth 2 (two) times daily as needed for Anxiety.     apixaban 5 mg Tab  Commonly known as: ELIQUIS  Take 1 tablet (5 mg total) by mouth 2 (two) times daily.     atorvastatin 20 MG tablet  Commonly known as: LIPITOR  Take 1 tablet (20 mg total) by mouth once daily.     busPIRone 15 MG tablet  Commonly known as: BUSPAR  Take 1 tablet (15 mg total) by mouth 3 (three) times daily as needed (anxiety).     butalbital-acetaminophen-caffeine -40 mg -40 mg per tablet  Commonly known as: FIORICET, ESGIC  TAKE 1 TABLET DAILY AS NEEDED FOR PAIN OR HEADACHES.     cholecalciferol (vitamin D3) 1,250 mcg (50,000 unit) capsule  Take 1 capsule (50,000 Units total) by mouth once a week.     cloNIDine 0.1 MG tablet  Commonly known as: CATAPRES  Take 1  tablet (0.1 mg total) by mouth 2 (two) times daily as needed (BP>170/95).     diclofenac sodium 1 % Gel  Commonly known as: VOLTAREN  Apply 2 grams topically once daily.     DULoxetine 30 MG capsule  Commonly known as: CYMBALTA  Take 30 mg by mouth once daily.     furosemide 20 MG tablet  Commonly known as: LASIX  Take 1 tablet by mouth BID     letrozole 2.5 mg Tab  Commonly known as: FEMARA  Take 1 tablet (2.5 mg total) by mouth once daily.     LIDOcaine 5 %  Commonly known as: LIDODERM  Place 2 patches onto the skin once daily. Remove & Discard patch within 12 hours or as directed by MD     multivitamin per tablet  Commonly known as: THERAGRAN  Take 1 tablet by mouth once daily.     oxyCODONE-acetaminophen 7.5-325 mg per tablet  Commonly known as: PERCOCET  Take 1 tablet by mouth every 6 (six) hours as needed for Pain.     pregabalin 75 MG capsule  Commonly known as: LYRICA  Take 1 capsule (75 mg total) by mouth 3 (three) times daily.     ranolazine 1,000 mg Tb12  Commonly known as: RANEXA  Take 1 tablet (1,000 mg total) by mouth 2 (two) times daily.     zolpidem 5 MG Tab  Commonly known as: AMBIEN  Take 1 tablet (5 mg total) by mouth nightly as needed (sleep).        STOP taking these medications    carvediloL 25 MG tablet  Commonly known as: COREG     doxazosin 4 MG tablet  Commonly known as: CARDURA     ENTRESTO 24-26 mg per tablet  Generic drug: sacubitriL-valsartan  Replaced by: ENTRESTO 49-51 mg per tablet     hydrALAZINE 100 MG tablet  Commonly known as: APRESOLINE     hydroCHLOROthiazide 50 MG tablet  Commonly known as: HYDRODIURIL     hydrOXYzine HCL 25 MG tablet  Commonly known as: ATARAX     oxybutynin 5 MG Tab  Commonly known as: DITROPAN     tiZANidine 4 MG tablet  Commonly known as: ZANAFLEX            Indwelling Lines/Drains at time of discharge:   Lines/Drains/Airways     Drain  Duration                Closed/Suction Drain 03/02/21 4395 Right Breast Bulb 19 Fr. 743 days                Time spent on  the discharge of patient: 50 minutes         Leana Ewing MD  Department of Hospital Medicine  Critical access hospital - Intensive Care ([facility])

## 2023-03-15 NOTE — CONSULTS
Discussed home health with patient at bedside. She has used [facility] in the past and is agreeable to referral being sent there. Reached out to [facility] for approval of BSC. Once approved, will deliver to the bedside.

## 2023-03-15 NOTE — PLAN OF CARE
Patient tolerated OT treatment fair due to symptomatic orthostatic hypotension. Sup>sit with CGA. Sit>stand with CGA. Continue recommending SNF vs. HHOT pending pt progress.

## 2023-03-15 NOTE — ASSESSMENT & PLAN NOTE
Having issues with orthostatic dizziness but supine hypertension  Neurology consulted on admission- MRI/MRA Head/Neck with no acute findings   Cardiology following -recommend compression stockings to b/l hips to increase venous return, OP visit with exercise  to discuss autonomic exercises to improve vascular tone  TTE with concentric LVH, EF 65%, normal diastolic function   Continue SCDs/MONIKA hose  Follow up with PCP and cardiology on discharge

## 2023-03-15 NOTE — PROGRESS NOTES
[facility] ([facility])  Cardiology  Progress Note    Patient Name: Susu Luther  MRN: [id number]  Admission Date: 3/9/2023  Hospital Length of Stay: 4 days  Code Status: Full Code   Attending Physician: Leana Ewing MD   Primary Care Physician: Peace Arias MD  Expected Discharge Date:   Principal Problem:Hypertension    Subjective:     Hospital Course:   3/11/23: pt seen and examined this am in ICU.  Off dopamine gtt at time of visit.  HR was normal at bedside. BP high. Has headache.  Seems to have some brief vagally mediated bradycardia at times. Coreg on hold.    3/14/23- Pt seen and examined today, asked re-eval for blood pressure issues. Labs reviewed, BP 180s/100s, HR 60s     3/15/2023--patient seen and examined in ICU. Had episode of ortho stasis earlier this AM. BP dropped in 80s upon standing. Recommend compression stockings to mid thighs during wakening hours. Recommend OP visit with exercise  to discuss autonomic exercises to improve vascular tone.       Interval History: no acute issues noted o/n. Recommend compression stockings to bilateral hips to improve vascular return.     Review of Systems   Constitutional: Positive for malaise/fatigue.   HENT:  Negative for hearing loss and hoarse voice.    Eyes:  Negative for blurred vision and visual disturbance.   Cardiovascular:  Negative for chest pain, claudication, dyspnea on exertion, irregular heartbeat, leg swelling, near-syncope, orthopnea, palpitations, paroxysmal nocturnal dyspnea and syncope.   Respiratory:  Negative for cough, hemoptysis, shortness of breath, sleep disturbances due to breathing, snoring and wheezing.    Endocrine: Negative for cold intolerance and heat intolerance.   Hematologic/Lymphatic: Does not bruise/bleed easily.   Skin:  Negative for color change, dry skin and nail changes.   Musculoskeletal:  Positive for back pain, joint pain and myalgias.   Gastrointestinal:  Negative for bloating, abdominal  pain, constipation, nausea and vomiting.   Genitourinary:  Negative for dysuria, flank pain, hematuria and hesitancy.   Neurological:  Positive for dizziness (upon standing). Negative for headaches, light-headedness, loss of balance, numbness, paresthesias and weakness.   Psychiatric/Behavioral:  Negative for altered mental status.    Allergic/Immunologic: Positive for environmental allergies.   Objective:     Vital Signs (Most Recent):  Temp: 98 °F (36.7 °C) (03/15/23 1130)  Pulse: 86 (03/15/23 1100)  Resp: 20 (03/14/23 2000)  BP: 133/83 (03/15/23 1100)  SpO2: 99 % (03/15/23 1100) Vital Signs (24h Range):  Temp:  [98 °F (36.7 °C)-98.5 °F (36.9 °C)] 98 °F (36.7 °C)  Pulse:  [65-94] 86  Resp:  [20] 20  SpO2:  [94 %-100 %] 99 %  BP: ()/() 133/83     Weight: 133.6 kg (294 lb 8.6 oz)  Body mass index is 43.5 kg/m².     SpO2: 99 %         Intake/Output Summary (Last 24 hours) at 3/15/2023 1355  Last data filed at 3/15/2023 0701  Gross per 24 hour   Intake --   Output 2200 ml   Net -2200 ml       Lines/Drains/Airways       Drain  Duration                  Closed/Suction Drain 03/02/21 1625 Right Breast Bulb 19 Fr. 742 days              Peripheral Intravenous Line  Duration                  Peripheral IV - Single Lumen 03/09/23 1600 20 G Anterior;Left Antecubital 5 days                    Physical Exam  Vitals and nursing note reviewed.   Constitutional:       General: She is not in acute distress.     Appearance: Normal appearance. She is well-developed. She is obese. She is not ill-appearing.   HENT:      Head: Normocephalic and atraumatic.      Nose: Nose normal.      Mouth/Throat:      Mouth: Mucous membranes are moist.   Eyes:      Pupils: Pupils are equal, round, and reactive to light.   Neck:      Thyroid: No thyromegaly.      Vascular: No JVD.      Trachea: No tracheal deviation.   Cardiovascular:      Rate and Rhythm: Normal rate and regular rhythm.      Chest Wall: PMI is not displaced.      Pulses:  Intact distal pulses.           Radial pulses are 2+ on the right side and 2+ on the left side.        Dorsalis pedis pulses are 2+ on the right side and 2+ on the left side.      Heart sounds: S1 normal and S2 normal. Heart sounds not distant. No murmur heard.  Pulmonary:      Effort: Pulmonary effort is normal. No respiratory distress.      Breath sounds: Normal breath sounds. No wheezing.   Abdominal:      General: Bowel sounds are normal. There is no distension.      Palpations: Abdomen is soft.      Tenderness: There is no abdominal tenderness.   Musculoskeletal:         General: No swelling. Normal range of motion.      Cervical back: Full passive range of motion without pain, normal range of motion and neck supple.      Right ankle: No swelling.      Left ankle: No swelling.   Skin:     General: Skin is warm and dry.      Capillary Refill: Capillary refill takes less than 2 seconds.      Nails: There is no clubbing.   Neurological:      General: No focal deficit present.      Mental Status: She is alert and oriented to person, place, and time.      Motor: Weakness present.   Psychiatric:         Speech: Speech normal.         Behavior: Behavior normal.         Thought Content: Thought content normal.         Judgment: Judgment normal.       Significant Labs: CMP   Recent Labs   Lab 03/14/23  0421 03/15/23  0442    139   K 4.5 3.9    106   CO2 20* 21*   * 115*   BUN 12 12   CREATININE 1.0 1.0   CALCIUM 10.0 9.3   ANIONGAP 13 12   , CBC   Recent Labs   Lab 03/15/23  0442   WBC 11.15   HGB 13.1   HCT 40.6      , Troponin No results for input(s): TROPONINI in the last 48 hours., and All pertinent lab results from the last 24 hours have been reviewed.    Significant Imaging: Echocardiogram: Transthoracic echo (TTE) complete (Cupid Only):   Results for orders placed or performed during the hospital encounter of 03/09/23   Echo   Result Value Ref Range    BSA 2.58 m2    TDI SEPTAL 0.07 m/s     LV LATERAL E/E' RATIO 5.89 m/s    LV SEPTAL E/E' RATIO 7.57 m/s    LA WIDTH 4.20 cm    IVC diameter 1.5 cm    Left Ventricular Outflow Tract Mean Velocity 1.02 cm/s    Left Ventricular Outflow Tract Mean Gradient 4.30 mmHg    TV mean gradient 19 mmHg    TDI LATERAL 0.09 m/s    LVIDd 4.41 3.5 - 6.0 cm    IVS 1.60 (A) 0.6 - 1.1 cm    Posterior Wall 1.49 (A) 0.6 - 1.1 cm    Ao root annulus 3.15 cm    LVIDs 2.57 2.1 - 4.0 cm    FS 42 28 - 44 %    LA volume 67.89 cm3    STJ 2.94 cm    Ascending aorta 2.67 cm    LV mass 280.24 g    LA size 3.64 cm    TAPSE 2.00 cm    Left Ventricle Relative Wall Thickness 0.68 cm    AV mean gradient 6 mmHg    AV valve area 2.86 cm2    AV Velocity Ratio 0.79     AV index (prosthetic) 0.91     MV valve area p 1/2 method 2.79 cm2    E/A ratio 0.82     Mean e' 0.08 m/s    E wave deceleration time 272.20 msec    IVRT 76.12 msec    LVOT diameter 2.00 cm    LVOT area 3.1 cm2    LVOT peak henry 1.19 m/s    LVOT peak VTI 28.80 cm    Ao peak henry 1.50 m/s    Ao VTI 31.6 cm    RVOT peak henry 1.04 m/s    RVOT peak VTI 25.1 cm    LVOT stroke volume 90.43 cm3    AV peak gradient 9 mmHg    PV mean gradient 2.75 mmHg    E/E' ratio 6.63 m/s    MV Peak E Henry 0.53 m/s    TR Max Henry 2.60 m/s    MV stenosis pressure 1/2 time 78.94 ms    MV Peak A Henry 0.65 m/s    LV Systolic Volume 23.99 mL    LV Systolic Volume Index 9.8 mL/m2    LV Diastolic Volume 87.94 mL    LV Diastolic Volume Index 35.75 mL/m2    LA Volume Index 27.6 mL/m2    LV Mass Index 114 g/m2    RA Major Axis 3.89 cm    Left Atrium Minor Axis 5.40 cm    Left Atrium Major Axis 5.06 cm    Triscuspid Valve Regurgitation Peak Gradient 27 mmHg    RA Width 3.00 cm    Right Atrial Pressure (from IVC) 3 mmHg    EF 65 %    TV rest pulmonary artery pressure 30 mmHg    Narrative    · The left ventricle is normal in size with concentric hypertrophy and   normal systolic function.  · The estimated ejection fraction is 65%.  · Normal left ventricular diastolic  function.  · Normal right ventricular size with normal right ventricular systolic   function.  · Normal central venous pressure (3 mmHg).  · The estimated PA systolic pressure is 30 mmHg.        Assessment and Plan:       * Hypertension  Hold Coreg for symptomatic bradycardia  PRN Hydralazine, titrate meds as needed    3/14/23  Increased entresto  Cont Amlodipine  PRN hydralazine/clonidine  Assess response    3/15/2023  -Continue Entresto, Norvasc  -Assess response    Orthostatic hypotension  Encourage compression stockings to mid thigh prior to ambulating  Recommend OP visit with exercise  to review exercises to promote vascular tone    Bradycardia  Possible bradycardia episode, tele room noted 47 lowest HR  Hold A-V jena blocking agents  Transfer to ICU, ok to use dopamine  Assess response    3/14/23  HR 60s  Cont current medications    Chronic diastolic congestive heart failure  Echo EF nml  BNP negative  Hold BB, for possible bradycardia episode  Cont medical management    3/14/23  Increased entresto  Cont lasix        History of pulmonary embolism  Continue Eliquis    TANNER (acute kidney injury)  Management per     Headache  BP control, management per HM    3/14/23  Increased entresto    Morbid obesity with BMI of 45.0-49.9, adult  Weight loss encouraged    NILS (obstructive sleep apnea)  CPAP compliance strongly encouraged        VTE Risk Mitigation (From admission, onward)         Ordered     apixaban tablet 5 mg  2 times daily         03/09/23 1624     Reason for No Pharmacological VTE Prophylaxis  Once        Question:  Reasons:  Answer:  Already adequately anticoagulated on oral Anticoagulants    03/09/23 1620     IP VTE HIGH RISK PATIENT  Once         03/09/23 1620     Place sequential compression device  Until discontinued         03/09/23 1620                MORAIMA Brown  Cardiology  [facility] ([facility])

## 2023-03-15 NOTE — ASSESSMENT & PLAN NOTE
Encourage compression stockings to mid thigh prior to ambulating  Recommend OP visit with exercise  to review exercises to promote vascular tone

## 2023-03-15 NOTE — ASSESSMENT & PLAN NOTE
Having issues with orthostatic dizziness but supine hypertension, dizziness is limiting patient's mobility and participation with PT   Cardiology following   Neurology consulted on admission  MRI/MRA Head/Neck with no acute findings   TTE with concentric LVH, EF 65%, normal diastolic function   Continue SCDs/MONIKA hose  Monitor orthostatics

## 2023-03-15 NOTE — ASSESSMENT & PLAN NOTE
Likely due to vagal mediated, recent neg stress test.  Resolved   Cardiology on board, recommended to hold Coreg/AV jena blocking medications, Recommend 1 week  Holter as outpatient   F/u cardiology as above

## 2023-03-15 NOTE — ASSESSMENT & PLAN NOTE
- Cardiology reconsulted; appreciate recs   - Continue Norvasc, lasix, entresto (dose increased by cardiology)   - monitor BP   - IV PRN meds available   - Clonidine held due to HA, Coreg held due to bradycardia

## 2023-03-15 NOTE — PROGRESS NOTES
[facility] Hospital Medicine  Progress Note    Patient Name: Susu Luther  MRN: [id number]  Patient Class: IP- Inpatient   Admission Date: 3/9/2023  Length of Stay: 4 days  Attending Physician: Leana Ewing MD  Primary Care Provider: Peace Arias MD        Subjective:     Principal Problem:Hypertension        HPI:  The patient is 74 yo female with past medical history of right breast cancer, hypertension, anxiety, AAA s/p repair, CVA, diastolic heart failure, TANNER, sleep apnea, and obesity who presented to the ED due to elevated blood pressure and dizziness. She reports her systolic blood pressure has been in the 200s at home. She has a headache and is dizzy. She went to her PCP to be evaluated and had  near syncopal episode. Patient is anxious and tearful when discussing her blood pressure. She is concerned about a lump she found in upper portion of her right breast. She states she forgot to mention it to Dr. Arias. Discussed ordering outpatient US as breast US are not usually done while patients are in the hospital. She verbalized understanding. Hospital medicine consulted. Patient noted to have TANNER. Patient placed in observation.      Overview/Hospital Course:  73 year old female with a known past medical history of right breast cancer that is currently in remission, hypertension, anxiety, AAA s/p repair, CVA, diastolic heart failure, TANNER, sleep apnea, PE on Eliquis, and obesity who presented to the ED 3/9 due to elevated blood pressure, headache and dizziness. She reports her systolic blood pressure has been in the 200s at home.  She went to her PCP 3/9 to be evaluated and had a near syncopal episode.  Hospital medicine consulted and placed in observation.  On arrival CT head -ve for acute intracranial abn;      On 3/10, pt was noted to have worsening slurring and stuttering while speaking that has been worsening since overnight; stat repeat CT head obtained that was without acute findings. Vascular  "Neurology was unable to complete assessment due to patient's acute change in condition.. Later in the day patient developed nausea, vomiting, diarrhea, and episodes of bradycardia with heart rate in 30s. Pt also s/o blurry vision and the "room is spinning". Pt was on coreg for BP control that has since been held.  Cardiology was consulted.  Pt moved to the ICU on the afternoon of 3/10 for closer monitoring and has been started on dopamine gtt for HR and BP support. TSH, lactic acid, electrolytes within normal limits.    On 03/11, noted to have improvement in heart rate, blood pressure, dopamine decreased to 2.5 mics per kg per minute, eventually weaned off, heart rate, blood pressure is stayed stable.  Cardiology recommended to continue to hold Coreg. Patient was deemed stable for downgrade ICU team, hospital medicine consulted to assume care.    MRI brain, MRA head and neck did not show acute findings/stenosis/occlusion. Subsequently after imaging, amlodipine was added, creatinine improved, Entresto added.  Patient stated that she develops nausea with severe headache while on clonidine, hence not on it at home prior to admission.  Has been evaluated by Neurology on 03/12, recommended to control ortho stats, correct electrolytes as needed, PT OT, delirium/fall precautions.TANNER resolved. She is concerned about a lump she found in upper portion of her right breast. She states she forgot to mention it to Dr. Arias- recommend OP US breast    03/13: Having issues with supine HTN, severe orthostatic hypotension.         Interval History: Continues to have dizziness with ambulation and supine HTN. Orthostatics positive, AM meds held. Denies CP, SOB. Reports HA     Review of Systems  Objective:     Vital Signs (Most Recent):  Temp: 98.5 °F (36.9 °C) (03/15/23 0700)  Pulse: 87 (03/15/23 0927)  Resp: 20 (03/14/23 2000)  BP: (!) 87/57 (03/15/23 0933)  SpO2: 98 % (03/15/23 0927)   Vital Signs (24h Range):  Temp:  [98 °F (36.7 " °C)-98.5 °F (36.9 °C)] 98.5 °F (36.9 °C)  Pulse:  [] 87  Resp:  [20] 20  SpO2:  [94 %-100 %] 98 %  BP: ()/() 87/57     Weight: 133.6 kg (294 lb 8.6 oz)  Body mass index is 43.5 kg/m².    Intake/Output Summary (Last 24 hours) at 3/15/2023 1008  Last data filed at 3/15/2023 0701  Gross per 24 hour   Intake --   Output 2750 ml   Net -2750 ml      Physical Exam  Vitals and nursing note reviewed.   Constitutional:       General: She is not in acute distress.     Appearance: She is obese. She is not ill-appearing.   Cardiovascular:      Rate and Rhythm: Normal rate and regular rhythm.      Heart sounds: No murmur heard.    No friction rub. No gallop.   Pulmonary:      Effort: Pulmonary effort is normal.      Breath sounds: Normal breath sounds. No wheezing, rhonchi or rales.   Abdominal:      General: Bowel sounds are normal. There is no distension.      Palpations: Abdomen is soft.      Tenderness: There is no abdominal tenderness.   Musculoskeletal:      Comments: Trace BLE edema    Neurological:      General: No focal deficit present.      Mental Status: She is alert and oriented to person, place, and time. Mental status is at baseline.       Significant Labs: All pertinent labs within the past 24 hours have been reviewed.    Significant Imaging: I have reviewed all pertinent imaging results/findings within the past 24 hours.      Assessment/Plan:      * Hypertension  - Continue Norvasc, lasix, entresto- held this am due to hypotension   - monitor BP   - IV PRN meds available   - Clonidine held due to HA, Coreg held due to bradycardia     Orthostatic hypotension  Having issues with orthostatic dizziness but supine hypertension  Neurology consulted on admission  MRI/MRA Head/Neck with no acute findings   TTE with concentric LVH, EF 65%, normal diastolic function   Trial SCD/MONIKA hose  Monitor orthostatics     TANNER (acute kidney injury)  Resolved, likely prerenal vs. Due to elevated BP   Monitor BMP    Renally dose meds; avoid nephrotoxic agents     Bradycardia  Likely due to vagal mediated, recent neg stress test. Resolved   Cardiology on board, recommended to hold Coreg/AV jena blocking medications, Recommend 1 week  Holter as outpatient   Tele monitoring      Chronic diastolic congestive heart failure  Patient is identified as having Diastolic (HFpEF) heart failure that is Chronic. CHF is currently controlled. Latest ECHO performed and demonstrates- Results for orders placed during the hospital encounter of 02/15/22    Echo    Interpretation Summary  · The left ventricle is normal in size with concentric hypertrophy and normal systolic function.  · The estimated ejection fraction is 60%.  · Normal left ventricular diastolic function.  · Normal right ventricular size with normal right ventricular systolic function.  · Mild to moderate tricuspid regurgitation.  · Normal central venous pressure (3 mmHg).  · The estimated PA systolic pressure is 36 mmHg.  . Continue Furosemide and monitor clinical status closely. Monitor on telemetry. Patient is off CHF pathway.  Monitor strict Is&Os and daily weights.  Place on fluid restriction of 1.5 L. Continue to stress to patient importance of self efficacy and  on diet for CHF. Last BNP reviewed- and noted below   Recent Labs   Lab 03/09/23  1508   BNP 29   Cardiology reconsulted   Continue home Entresto and diuretics, appears euvolemic at this time   B blocker on hold due to bradycardia   Strict Is and Os    Urinary retention  - atkins placed 3/11 due to urinary retention   -hold ditropan   - voiding trial once pt is able to ambulate with PT     History of pulmonary embolism  - continue Eliquis     Headache  MRI MRA head neck with no acute pathology   PRN Fioricet     Malignant neoplasm of upper-outer quadrant of right breast in female, estrogen receptor positive  Followed by oncology   Continue Femara   new lump felt by patient, unable to perform breast imaging as  inpt  Recommend outpatient evaluation with oncology on discharge     NILS (obstructive sleep apnea)  Continue CPAP nightly        VTE Risk Mitigation (From admission, onward)         Ordered     apixaban tablet 5 mg  2 times daily         03/09/23 1624     Reason for No Pharmacological VTE Prophylaxis  Once        Question:  Reasons:  Answer:  Already adequately anticoagulated on oral Anticoagulants    03/09/23 1620     IP VTE HIGH RISK PATIENT  Once         03/09/23 1620     Place sequential compression device  Until discontinued         03/09/23 1620                Discharge Planning   MARTA:      Code Status: Full Code   Is the patient medically ready for discharge?:     Reason for patient still in hospital (select all that apply): Patient trending condition  Discharge Plan A: Home Health          Leana Ewing MD  Department of Hospital Medicine   [facility]

## 2023-03-15 NOTE — PLAN OF CARE
[facility] - [facility] (Hospital)  Discharge Final Note    Primary Care Provider: Peace Arias MD    Expected Discharge Date: 3/15/2023    Final Discharge Note (most recent)       Final Note - 03/15/23 1615          Final Note    Assessment Type Final Discharge Note     Anticipated Discharge Disposition Home-Health Care [facility]     Hospital Resources/Appts/Education Provided Appointments scheduled and added to AVS;Post-Acute resouces added to AVS        Post-Acute Status    Post-Acute Authorization Home Health;HME     HME Status Set-up Complete/Auth obtained     Home Health Status Set-up Complete/Auth obtained     Discharge Delays None known at this time                     Important Message from Medicare  Important Message from Medicare regarding Discharge Appeal Rights: Signed/date by patient/caregiver, Explained to patient/caregiver, Given to patient/caregiver     Date IMM was signed: 03/15/23  Time IMM was signed: 1611    Contact Info       Peace Arias MD   Specialty: Internal Medicine   Relationship: PCP - General    [address] [place] [address] [address] [id number]   Phone: [phone]       Next Steps: Follow up in 2 day(s)    Instructions: call clinic to schedule appointment ASAP, f/u BP and breast imaging    Arlene Hobbs MD   Specialty: Cardiology   Relationship: Consulting Physician    [address] [place] [id number]   Phone: [phone]       Next Steps: Schedule an appointment as soon as possible for a visit in 1 week(s)    Instructions: need to follow up BP, dizziness. Needs holter monitor per cardiology    Raul Boyd OD   Specialty: Optometry   Relationship: Consulting Physician    [address] [place] [address] [address]  [place] [address]   Phone: [phone]       Next Steps: Follow up    Instructions: follow up as prev scheduled on 04/05/23          BSC delivered to the bedside. [facility] accepted pt. PCP follow up scheduled for 3/17/23

## 2023-03-15 NOTE — ASSESSMENT & PLAN NOTE
- Cardiology consulted   - Continue Norvasc, lasix, entresto (dose increased by cardiology), Clonidine as needed  - Coreg held due to bradycardia   - monitor BP   - follow up PCP within 3-5 days for further management

## 2023-03-15 NOTE — SUBJECTIVE & OBJECTIVE
Interval History: Continues to have dizziness with ambulation and supine HTN. Orthostatics positive, AM meds held. Denies CP, SOB. Reports HA     Review of Systems  Objective:     Vital Signs (Most Recent):  Temp: 98.5 °F (36.9 °C) (03/15/23 0700)  Pulse: 87 (03/15/23 0927)  Resp: 20 (03/14/23 2000)  BP: (!) 87/57 (03/15/23 0933)  SpO2: 98 % (03/15/23 0927)   Vital Signs (24h Range):  Temp:  [98 °F (36.7 °C)-98.5 °F (36.9 °C)] 98.5 °F (36.9 °C)  Pulse:  [] 87  Resp:  [20] 20  SpO2:  [94 %-100 %] 98 %  BP: ()/() 87/57     Weight: 133.6 kg (294 lb 8.6 oz)  Body mass index is 43.5 kg/m².    Intake/Output Summary (Last 24 hours) at 3/15/2023 1008  Last data filed at 3/15/2023 0701  Gross per 24 hour   Intake --   Output 2750 ml   Net -2750 ml      Physical Exam  Vitals and nursing note reviewed.   Constitutional:       General: She is not in acute distress.     Appearance: She is obese. She is not ill-appearing.   Cardiovascular:      Rate and Rhythm: Normal rate and regular rhythm.      Heart sounds: No murmur heard.    No friction rub. No gallop.   Pulmonary:      Effort: Pulmonary effort is normal.      Breath sounds: Normal breath sounds. No wheezing, rhonchi or rales.   Abdominal:      General: Bowel sounds are normal. There is no distension.      Palpations: Abdomen is soft.      Tenderness: There is no abdominal tenderness.   Musculoskeletal:      Comments: Trace BLE edema    Neurological:      General: No focal deficit present.      Mental Status: She is alert and oriented to person, place, and time. Mental status is at baseline.       Significant Labs: All pertinent labs within the past 24 hours have been reviewed.    Significant Imaging: I have reviewed all pertinent imaging results/findings within the past 24 hours.

## 2023-03-15 NOTE — SUBJECTIVE & OBJECTIVE
Interval History: no acute issues noted o/n. Recommend compression stockings to bilateral hips to improve vascular return.     Review of Systems   Constitutional: Positive for malaise/fatigue.   HENT:  Negative for hearing loss and hoarse voice.    Eyes:  Negative for blurred vision and visual disturbance.   Cardiovascular:  Negative for chest pain, claudication, dyspnea on exertion, irregular heartbeat, leg swelling, near-syncope, orthopnea, palpitations, paroxysmal nocturnal dyspnea and syncope.   Respiratory:  Negative for cough, hemoptysis, shortness of breath, sleep disturbances due to breathing, snoring and wheezing.    Endocrine: Negative for cold intolerance and heat intolerance.   Hematologic/Lymphatic: Does not bruise/bleed easily.   Skin:  Negative for color change, dry skin and nail changes.   Musculoskeletal:  Positive for back pain, joint pain and myalgias.   Gastrointestinal:  Negative for bloating, abdominal pain, constipation, nausea and vomiting.   Genitourinary:  Negative for dysuria, flank pain, hematuria and hesitancy.   Neurological:  Positive for dizziness (upon standing). Negative for headaches, light-headedness, loss of balance, numbness, paresthesias and weakness.   Psychiatric/Behavioral:  Negative for altered mental status.    Allergic/Immunologic: Positive for environmental allergies.   Objective:     Vital Signs (Most Recent):  Temp: 98 °F (36.7 °C) (03/15/23 1130)  Pulse: 86 (03/15/23 1100)  Resp: 20 (03/14/23 2000)  BP: 133/83 (03/15/23 1100)  SpO2: 99 % (03/15/23 1100) Vital Signs (24h Range):  Temp:  [98 °F (36.7 °C)-98.5 °F (36.9 °C)] 98 °F (36.7 °C)  Pulse:  [65-94] 86  Resp:  [20] 20  SpO2:  [94 %-100 %] 99 %  BP: ()/() 133/83     Weight: 133.6 kg (294 lb 8.6 oz)  Body mass index is 43.5 kg/m².     SpO2: 99 %         Intake/Output Summary (Last 24 hours) at 3/15/2023 7354  Last data filed at 3/15/2023 0701  Gross per 24 hour   Intake --   Output 2200 ml   Net -2200 ml        Lines/Drains/Airways       Drain  Duration                  Closed/Suction Drain 03/02/21 1625 Right Breast Bulb 19 Fr. 742 days              Peripheral Intravenous Line  Duration                  Peripheral IV - Single Lumen 03/09/23 1600 20 G Anterior;Left Antecubital 5 days                    Physical Exam  Vitals and nursing note reviewed.   Constitutional:       General: She is not in acute distress.     Appearance: Normal appearance. She is well-developed. She is obese. She is not ill-appearing.   HENT:      Head: Normocephalic and atraumatic.      Nose: Nose normal.      Mouth/Throat:      Mouth: Mucous membranes are moist.   Eyes:      Pupils: Pupils are equal, round, and reactive to light.   Neck:      Thyroid: No thyromegaly.      Vascular: No JVD.      Trachea: No tracheal deviation.   Cardiovascular:      Rate and Rhythm: Normal rate and regular rhythm.      Chest Wall: PMI is not displaced.      Pulses: Intact distal pulses.           Radial pulses are 2+ on the right side and 2+ on the left side.        Dorsalis pedis pulses are 2+ on the right side and 2+ on the left side.      Heart sounds: S1 normal and S2 normal. Heart sounds not distant. No murmur heard.  Pulmonary:      Effort: Pulmonary effort is normal. No respiratory distress.      Breath sounds: Normal breath sounds. No wheezing.   Abdominal:      General: Bowel sounds are normal. There is no distension.      Palpations: Abdomen is soft.      Tenderness: There is no abdominal tenderness.   Musculoskeletal:         General: No swelling. Normal range of motion.      Cervical back: Full passive range of motion without pain, normal range of motion and neck supple.      Right ankle: No swelling.      Left ankle: No swelling.   Skin:     General: Skin is warm and dry.      Capillary Refill: Capillary refill takes less than 2 seconds.      Nails: There is no clubbing.   Neurological:      General: No focal deficit present.      Mental Status:  She is alert and oriented to person, place, and time.      Motor: Weakness present.   Psychiatric:         Speech: Speech normal.         Behavior: Behavior normal.         Thought Content: Thought content normal.         Judgment: Judgment normal.       Significant Labs: CMP   Recent Labs   Lab 03/14/23  0421 03/15/23  0442    139   K 4.5 3.9    106   CO2 20* 21*   * 115*   BUN 12 12   CREATININE 1.0 1.0   CALCIUM 10.0 9.3   ANIONGAP 13 12   , CBC   Recent Labs   Lab 03/15/23  0442   WBC 11.15   HGB 13.1   HCT 40.6      , Troponin No results for input(s): TROPONINI in the last 48 hours., and All pertinent lab results from the last 24 hours have been reviewed.    Significant Imaging: Echocardiogram: Transthoracic echo (TTE) complete (Cupid Only):   Results for orders placed or performed during the hospital encounter of 03/09/23   Echo   Result Value Ref Range    BSA 2.58 m2    TDI SEPTAL 0.07 m/s    LV LATERAL E/E' RATIO 5.89 m/s    LV SEPTAL E/E' RATIO 7.57 m/s    LA WIDTH 4.20 cm    IVC diameter 1.5 cm    Left Ventricular Outflow Tract Mean Velocity 1.02 cm/s    Left Ventricular Outflow Tract Mean Gradient 4.30 mmHg    TV mean gradient 19 mmHg    TDI LATERAL 0.09 m/s    LVIDd 4.41 3.5 - 6.0 cm    IVS 1.60 (A) 0.6 - 1.1 cm    Posterior Wall 1.49 (A) 0.6 - 1.1 cm    Ao root annulus 3.15 cm    LVIDs 2.57 2.1 - 4.0 cm    FS 42 28 - 44 %    LA volume 67.89 cm3    STJ 2.94 cm    Ascending aorta 2.67 cm    LV mass 280.24 g    LA size 3.64 cm    TAPSE 2.00 cm    Left Ventricle Relative Wall Thickness 0.68 cm    AV mean gradient 6 mmHg    AV valve area 2.86 cm2    AV Velocity Ratio 0.79     AV index (prosthetic) 0.91     MV valve area p 1/2 method 2.79 cm2    E/A ratio 0.82     Mean e' 0.08 m/s    E wave deceleration time 272.20 msec    IVRT 76.12 msec    LVOT diameter 2.00 cm    LVOT area 3.1 cm2    LVOT peak josé 1.19 m/s    LVOT peak VTI 28.80 cm    Ao peak josé 1.50 m/s    Ao VTI 31.6 cm    RVOT  peak henry 1.04 m/s    RVOT peak VTI 25.1 cm    LVOT stroke volume 90.43 cm3    AV peak gradient 9 mmHg    PV mean gradient 2.75 mmHg    E/E' ratio 6.63 m/s    MV Peak E Henry 0.53 m/s    TR Max Henry 2.60 m/s    MV stenosis pressure 1/2 time 78.94 ms    MV Peak A Henry 0.65 m/s    LV Systolic Volume 23.99 mL    LV Systolic Volume Index 9.8 mL/m2    LV Diastolic Volume 87.94 mL    LV Diastolic Volume Index 35.75 mL/m2    LA Volume Index 27.6 mL/m2    LV Mass Index 114 g/m2    RA Major Axis 3.89 cm    Left Atrium Minor Axis 5.40 cm    Left Atrium Major Axis 5.06 cm    Triscuspid Valve Regurgitation Peak Gradient 27 mmHg    RA Width 3.00 cm    Right Atrial Pressure (from IVC) 3 mmHg    EF 65 %    TV rest pulmonary artery pressure 30 mmHg    Narrative    · The left ventricle is normal in size with concentric hypertrophy and   normal systolic function.  · The estimated ejection fraction is 65%.  · Normal left ventricular diastolic function.  · Normal right ventricular size with normal right ventricular systolic   function.  · Normal central venous pressure (3 mmHg).  · The estimated PA systolic pressure is 30 mmHg.

## 2023-03-15 NOTE — ASSESSMENT & PLAN NOTE
- atkins placed 3/11 due to urinary retention   -Ditropan discontinued   - atkins discontinued 03/15, pt able to void spontaneously

## 2023-03-15 NOTE — PROGRESS NOTES
Patient discharged with belongings in hand. Wheeled down per hospital staff. Reinforced education, pt verbalized understanding. IV taken out, jelco intact. Telemetry monitor taken off. Medications reviewed with pt, verbalized understanding. NADN.   Patient to call and schedule appts, per pt request. Medication delivered to bedside.

## 2023-03-16 ENCOUNTER — PATIENT OUTREACH (OUTPATIENT)
Dept: ADMINISTRATIVE | Facility: CLINIC | Age: 73
End: 2023-03-16
Payer: MEDICARE

## 2023-03-16 ENCOUNTER — PATIENT MESSAGE (OUTPATIENT)
Dept: CARDIOLOGY | Facility: CLINIC | Age: 73
End: 2023-03-16
Payer: MEDICARE

## 2023-03-16 ENCOUNTER — PATIENT MESSAGE (OUTPATIENT)
Dept: OPHTHALMOLOGY | Facility: CLINIC | Age: 73
End: 2023-03-16
Payer: MEDICARE

## 2023-03-16 ENCOUNTER — PATIENT OUTREACH (OUTPATIENT)
Dept: ADMINISTRATIVE | Facility: HOSPITAL | Age: 73
End: 2023-03-16
Payer: MEDICARE

## 2023-03-16 NOTE — PROGRESS NOTES
Called patient to confirm hospital follow up scheduled on 3/17/2023.   Patient is confirmed. Encouraged patient to bring all medications and BP cuff to follow up visit.

## 2023-03-17 ENCOUNTER — OFFICE VISIT (OUTPATIENT)
Dept: INTERNAL MEDICINE | Facility: CLINIC | Age: 73
End: 2023-03-17
Payer: MEDICARE

## 2023-03-17 ENCOUNTER — OFFICE VISIT (OUTPATIENT)
Dept: OPHTHALMOLOGY | Facility: CLINIC | Age: 73
End: 2023-03-17
Payer: MEDICARE

## 2023-03-17 VITALS
SYSTOLIC BLOOD PRESSURE: 142 MMHG | OXYGEN SATURATION: 94 % | BODY MASS INDEX: 43.4 KG/M2 | TEMPERATURE: 99 F | WEIGHT: 293 LBS | HEART RATE: 122 BPM | DIASTOLIC BLOOD PRESSURE: 88 MMHG | HEIGHT: 69 IN

## 2023-03-17 DIAGNOSIS — F54 PSYCHOLOGICAL FACTORS AFFECTING MEDICAL CONDITION: ICD-10-CM

## 2023-03-17 DIAGNOSIS — I10 ESSENTIAL HYPERTENSION: ICD-10-CM

## 2023-03-17 DIAGNOSIS — F32.1 MAJOR DEPRESSIVE DISORDER, SINGLE EPISODE, MODERATE WITH ANXIOUS DISTRESS: ICD-10-CM

## 2023-03-17 DIAGNOSIS — R06.2 WHEEZING: ICD-10-CM

## 2023-03-17 DIAGNOSIS — N63.11 MASS OF UPPER OUTER QUADRANT OF RIGHT BREAST: Primary | ICD-10-CM

## 2023-03-17 DIAGNOSIS — G89.3 NEOPLASM RELATED PAIN: ICD-10-CM

## 2023-03-17 DIAGNOSIS — H16.303: Primary | ICD-10-CM

## 2023-03-17 DIAGNOSIS — R60.0 BILATERAL LOWER EXTREMITY EDEMA: ICD-10-CM

## 2023-03-17 PROCEDURE — 1159F MED LIST DOCD IN RCRD: CPT | Mod: HCNC,CPTII,S$GLB, | Performed by: FAMILY MEDICINE

## 2023-03-17 PROCEDURE — 3008F BODY MASS INDEX DOCD: CPT | Mod: HCNC,CPTII,S$GLB, | Performed by: FAMILY MEDICINE

## 2023-03-17 PROCEDURE — 99999 PR PBB SHADOW E&M-EST. PATIENT-LVL III: ICD-10-PCS | Mod: PBBFAC,HCNC,, | Performed by: OPTOMETRIST

## 2023-03-17 PROCEDURE — 1157F ADVNC CARE PLAN IN RCRD: CPT | Mod: HCNC,CPTII,S$GLB, | Performed by: OPTOMETRIST

## 2023-03-17 PROCEDURE — 3008F PR BODY MASS INDEX (BMI) DOCUMENTED: ICD-10-PCS | Mod: HCNC,CPTII,S$GLB, | Performed by: FAMILY MEDICINE

## 2023-03-17 PROCEDURE — 99999 PR PBB SHADOW E&M-EST. PATIENT-LVL V: CPT | Mod: PBBFAC,HCNC,, | Performed by: FAMILY MEDICINE

## 2023-03-17 PROCEDURE — 1126F AMNT PAIN NOTED NONE PRSNT: CPT | Mod: HCNC,CPTII,S$GLB, | Performed by: FAMILY MEDICINE

## 2023-03-17 PROCEDURE — 4010F PR ACE/ARB THEARPY RXD/TAKEN: ICD-10-PCS | Mod: HCNC,CPTII,S$GLB, | Performed by: FAMILY MEDICINE

## 2023-03-17 PROCEDURE — 4010F ACE/ARB THERAPY RXD/TAKEN: CPT | Mod: HCNC,CPTII,S$GLB, | Performed by: OPTOMETRIST

## 2023-03-17 PROCEDURE — 1126F PR PAIN SEVERITY QUANTIFIED, NO PAIN PRESENT: ICD-10-PCS | Mod: HCNC,CPTII,S$GLB, | Performed by: FAMILY MEDICINE

## 2023-03-17 PROCEDURE — 92012 INTRM OPH EXAM EST PATIENT: CPT | Mod: HCNC,S$GLB,, | Performed by: OPTOMETRIST

## 2023-03-17 PROCEDURE — 3079F DIAST BP 80-89 MM HG: CPT | Mod: HCNC,CPTII,S$GLB, | Performed by: FAMILY MEDICINE

## 2023-03-17 PROCEDURE — 1157F PR ADVANCE CARE PLAN OR EQUIV PRESENT IN MEDICAL RECORD: ICD-10-PCS | Mod: HCNC,CPTII,S$GLB, | Performed by: FAMILY MEDICINE

## 2023-03-17 PROCEDURE — 1157F ADVNC CARE PLAN IN RCRD: CPT | Mod: HCNC,CPTII,S$GLB, | Performed by: FAMILY MEDICINE

## 2023-03-17 PROCEDURE — 3288F PR FALLS RISK ASSESSMENT DOCUMENTED: ICD-10-PCS | Mod: HCNC,CPTII,S$GLB, | Performed by: FAMILY MEDICINE

## 2023-03-17 PROCEDURE — 3077F SYST BP >= 140 MM HG: CPT | Mod: HCNC,CPTII,S$GLB, | Performed by: FAMILY MEDICINE

## 2023-03-17 PROCEDURE — 99999 PR PBB SHADOW E&M-EST. PATIENT-LVL V: ICD-10-PCS | Mod: PBBFAC,HCNC,, | Performed by: FAMILY MEDICINE

## 2023-03-17 PROCEDURE — 4010F PR ACE/ARB THEARPY RXD/TAKEN: ICD-10-PCS | Mod: HCNC,CPTII,S$GLB, | Performed by: OPTOMETRIST

## 2023-03-17 PROCEDURE — 1100F PTFALLS ASSESS-DOCD GE2>/YR: CPT | Mod: HCNC,CPTII,S$GLB, | Performed by: FAMILY MEDICINE

## 2023-03-17 PROCEDURE — 1159F PR MEDICATION LIST DOCUMENTED IN MEDICAL RECORD: ICD-10-PCS | Mod: HCNC,CPTII,S$GLB, | Performed by: FAMILY MEDICINE

## 2023-03-17 PROCEDURE — 94640 AIRWAY INHALATION TREATMENT: CPT | Mod: HCNC,S$GLB,, | Performed by: FAMILY MEDICINE

## 2023-03-17 PROCEDURE — 3288F FALL RISK ASSESSMENT DOCD: CPT | Mod: HCNC,CPTII,S$GLB, | Performed by: FAMILY MEDICINE

## 2023-03-17 PROCEDURE — 99496 TRANSJ CARE MGMT HIGH F2F 7D: CPT | Mod: HCNC,25,S$GLB, | Performed by: FAMILY MEDICINE

## 2023-03-17 PROCEDURE — 1100F PR PT FALLS ASSESS DOC 2+ FALLS/FALL W/INJURY/YR: ICD-10-PCS | Mod: HCNC,CPTII,S$GLB, | Performed by: FAMILY MEDICINE

## 2023-03-17 PROCEDURE — 94640 PR INHAL RX, AIRWAY OBST/DX SPUTUM INDUCT: ICD-10-PCS | Mod: HCNC,S$GLB,, | Performed by: FAMILY MEDICINE

## 2023-03-17 PROCEDURE — 99496 TRANSITIONAL CARE MANAGE SERVICE 7 DAY DISCHARGE: ICD-10-PCS | Mod: HCNC,25,S$GLB, | Performed by: FAMILY MEDICINE

## 2023-03-17 PROCEDURE — 92012 PR EYE EXAM, EST PATIENT,INTERMED: ICD-10-PCS | Mod: HCNC,S$GLB,, | Performed by: OPTOMETRIST

## 2023-03-17 PROCEDURE — 1157F PR ADVANCE CARE PLAN OR EQUIV PRESENT IN MEDICAL RECORD: ICD-10-PCS | Mod: HCNC,CPTII,S$GLB, | Performed by: OPTOMETRIST

## 2023-03-17 PROCEDURE — 99999 PR PBB SHADOW E&M-EST. PATIENT-LVL III: CPT | Mod: PBBFAC,HCNC,, | Performed by: OPTOMETRIST

## 2023-03-17 PROCEDURE — 4010F ACE/ARB THERAPY RXD/TAKEN: CPT | Mod: HCNC,CPTII,S$GLB, | Performed by: FAMILY MEDICINE

## 2023-03-17 PROCEDURE — 3077F PR MOST RECENT SYSTOLIC BLOOD PRESSURE >= 140 MM HG: ICD-10-PCS | Mod: HCNC,CPTII,S$GLB, | Performed by: FAMILY MEDICINE

## 2023-03-17 PROCEDURE — 3079F PR MOST RECENT DIASTOLIC BLOOD PRESSURE 80-89 MM HG: ICD-10-PCS | Mod: HCNC,CPTII,S$GLB, | Performed by: FAMILY MEDICINE

## 2023-03-17 RX ORDER — LEVOCETIRIZINE DIHYDROCHLORIDE 5 MG/1
5 TABLET, FILM COATED ORAL NIGHTLY
Qty: 30 TABLET | Refills: 0 | Status: SHIPPED | OUTPATIENT
Start: 2023-03-17 | End: 2023-04-12 | Stop reason: SDUPTHER

## 2023-03-17 RX ORDER — BUTALBITAL, ACETAMINOPHEN AND CAFFEINE 50; 325; 40 MG/1; MG/1; MG/1
TABLET ORAL
Qty: 30 TABLET | Refills: 1 | Status: SHIPPED | OUTPATIENT
Start: 2023-03-17 | End: 2023-03-17 | Stop reason: SDUPTHER

## 2023-03-17 RX ORDER — OXYCODONE AND ACETAMINOPHEN 7.5; 325 MG/1; MG/1
1 TABLET ORAL EVERY 6 HOURS PRN
Qty: 60 TABLET | Refills: 0 | Status: SHIPPED | OUTPATIENT
Start: 2023-03-17 | End: 2023-03-17 | Stop reason: SDUPTHER

## 2023-03-17 RX ORDER — CLONIDINE HYDROCHLORIDE 0.1 MG/1
0.1 TABLET ORAL 2 TIMES DAILY PRN
Qty: 60 TABLET | Refills: 1 | Status: SHIPPED | OUTPATIENT
Start: 2023-03-17 | End: 2023-05-08 | Stop reason: SDUPTHER

## 2023-03-17 RX ORDER — PREGABALIN 100 MG/1
100 CAPSULE ORAL 2 TIMES DAILY
Qty: 60 CAPSULE | Refills: 6 | Status: SHIPPED | OUTPATIENT
Start: 2023-03-17 | End: 2023-04-13 | Stop reason: SDUPTHER

## 2023-03-17 RX ORDER — IPRATROPIUM BROMIDE AND ALBUTEROL SULFATE 2.5; .5 MG/3ML; MG/3ML
3 SOLUTION RESPIRATORY (INHALATION)
Status: COMPLETED | OUTPATIENT
Start: 2023-03-17 | End: 2023-03-17

## 2023-03-17 RX ORDER — ALPRAZOLAM 0.5 MG/1
0.5 TABLET ORAL 2 TIMES DAILY PRN
Qty: 180 TABLET | Refills: 0 | Status: SHIPPED | OUTPATIENT
Start: 2023-03-17 | End: 2023-04-28 | Stop reason: SDUPTHER

## 2023-03-17 RX ORDER — BUTALBITAL, ACETAMINOPHEN AND CAFFEINE 50; 325; 40 MG/1; MG/1; MG/1
TABLET ORAL
Qty: 30 TABLET | Refills: 1 | Status: SHIPPED | OUTPATIENT
Start: 2023-03-17 | End: 2023-03-23 | Stop reason: SDUPTHER

## 2023-03-17 RX ORDER — OXYCODONE AND ACETAMINOPHEN 7.5; 325 MG/1; MG/1
1 TABLET ORAL EVERY 6 HOURS PRN
Qty: 60 TABLET | Refills: 0 | Status: SHIPPED | OUTPATIENT
Start: 2023-03-17 | End: 2023-03-23 | Stop reason: SDUPTHER

## 2023-03-17 RX ORDER — ALPRAZOLAM 0.5 MG/1
0.5 TABLET ORAL 2 TIMES DAILY PRN
Qty: 180 TABLET | Refills: 0 | Status: SHIPPED | OUTPATIENT
Start: 2023-03-17 | End: 2023-03-17 | Stop reason: SDUPTHER

## 2023-03-17 RX ADMIN — IPRATROPIUM BROMIDE AND ALBUTEROL SULFATE 3 ML: 2.5; .5 SOLUTION RESPIRATORY (INHALATION) at 11:03

## 2023-03-17 NOTE — PROGRESS NOTES
Subjective:       Patient ID: Susu Luther is a 73 y.o. female.    Chief Complaint: Hospital Follow Up    HPI Ms Luther presents today for hospital follow up. She was sent by EMS from clinic.   Stayed for 4 days     Below is part of her discharge summary:    HPI:   The patient is 74 yo female with past medical history of right breast cancer, hypertension, anxiety, AAA s/p repair, CVA, diastolic heart failure, TANNER, sleep apnea, and obesity who presented to the ED due to elevated blood pressure and dizziness. She reports her systolic blood pressure has been in the 200s at home. She has a headache and is dizzy. She went to her PCP to be evaluated and had  near syncopal episode. Patient is anxious and tearful when discussing her blood pressure. She is concerned about a lump she found in upper portion of her right breast. She states she forgot to mention it to Dr. Arias. Discussed ordering outpatient US as breast US are not usually done while patients are in the hospital. She verbalized understanding. Hospital medicine consulted. Patient noted to have TANNER. Patient placed in observation.       Hospital Course:   73 year old female with a known past medical history of right breast cancer that is currently in remission, hypertension, anxiety, AAA s/p repair, CVA, diastolic heart failure, TANNER, sleep apnea, PE on Eliquis, and obesity who presented to the ED 3/9 due to elevated blood pressure, headache and dizziness. She reports her systolic blood pressure has been in the 200s at home.  She went to her PCP 3/9 to be evaluated and had a near syncopal episode.  Hospital medicine consulted and placed in observation.  On arrival CT head -ve for acute intracranial abn;      On 3/10, pt was noted to have worsening slurring and stuttering while speaking that has been worsening since overnight; stat repeat CT head obtained that was without acute findings. Vascular Neurology was unable to complete assessment due to patient's  "acute change in condition.. Later in the day patient developed nausea, vomiting, diarrhea, and episodes of bradycardia with heart rate in 30s. Pt also s/o blurry vision and the "room is spinning". Pt was on coreg for BP control that has since been held.  Cardiology was consulted.  Pt moved to the ICU on the afternoon of 3/10 for closer monitoring and has been started on dopamine gtt for HR and BP support. TSH, lactic acid, electrolytes within normal limits.     On 03/11, noted to have improvement in heart rate, blood pressure, dopamine decreased to 2.5 mics per kg per minute, eventually weaned off, heart rate, blood pressure is stayed stable.  Cardiology recommended to continue to hold Coreg. Patient was deemed stable for downgrade ICU team, hospital medicine consulted to assume care.     MRI brain, MRA head and neck did not show acute findings/stenosis/occlusion. Subsequently after imaging, amlodipine was added, creatinine improved, Entresto added.  Patient stated that she develops nausea with severe headache while on clonidine, hence not on it at home prior to admission.  Has been evaluated by Neurology on 03/12, recommended to control ortho stats, correct electrolytes as needed, PT OT, delirium/fall precautions.TANNER resolved. She is concerned about a lump she found in upper portion of her right breast. She states she forgot to mention it to Dr. Arias- recommend OP US breast     03/13: Having issues with supine HTN, severe orthostatic hypotension. Cardiology reconsulted to eval 03/14.           Pt seen and examined on day of discharge. Cardiology evaluated and recommend thigh high stockings/SCDs to support venous return, continue current medication regimen. She reports she feels better, was able to ambulate to use bathroom without dizziness, BP has maintained 130-140 systolic. Pt advised to keep log of BP at home until she can follow up with PCP and cardiology for further management. Return precautions, follow up " instructions and instructions re: slow positional changes discussed with patient and daughter at length, they expressed understanding. All questions answered to their satisfaction. Pt will followup with PCP and cardiology within 1 week. She was set up with  and DME prior to discharge. [facility] referral placed.      Transitional Care Note    Family and/or Caretaker present at visit?  Yes.  Diagnostic tests reviewed/disposition: No diagnosic tests pending after this hospitalization.  Disease/illness education: some  Home health/community services discussion/referrals: Patient has home health established at [facility] .   Establishment or re-establishment of referral orders for community resources: No other necessary community resources.   Discussion with other health care providers: No discussion with other health care providers necessary.     Follow up with Cardiology on Monday     Review of Systems   Constitutional:  Negative for activity change, appetite change, fatigue and fever.   HENT:  Negative for congestion, ear pain and sinus pressure.    Eyes:  Negative for pain and visual disturbance.   Respiratory:  Positive for chest tightness and shortness of breath. Negative for cough and wheezing.    Cardiovascular:  Negative for chest pain, palpitations and leg swelling.   Gastrointestinal:  Negative for abdominal distention, abdominal pain, constipation, diarrhea, nausea and vomiting.   Endocrine: Negative for polydipsia and polyuria.   Genitourinary:  Negative for difficulty urinating, dyspareunia, dysuria, flank pain and hematuria.   Musculoskeletal:  Negative for arthralgias and back pain.   Skin:  Negative for rash.   Neurological:  Positive for dizziness and weakness. Negative for tremors, syncope, numbness and headaches.   Psychiatric/Behavioral:  Negative for agitation and confusion.          Past Medical History:   Diagnosis Date    Cataract     Bilateral    Chronic diastolic congestive heart  failure 2020    Dizziness 2023    Encounter for blood transfusion     History of repair of aneurysm of abdominal aorta using endovascular stent graft     DR Bonds    Hx of psychiatric care     Hypertension     Major depressive disorder, single episode, moderate with anxious distress 2020    Malignant neoplasm of upper-outer quadrant of right breast in female, estrogen receptor positive 2019    radiation    Psychiatric problem     Sleep apnea     Sleep difficulties     Stroke 2009    no residual defect    Therapy      Past Surgical History:   Procedure Laterality Date    APPENDECTOMY      AXILLARY NODE DISSECTION Right 2020    Procedure: LYMPHADENECTOMY, AXILLARY;  Surgeon: Artemio Starks MD;  Location: [facility];  Service: General;  Laterality: Right;    BIOPSY OF AXILLARY NODE Right 2021    Procedure: BIOPSY, LYMPH NODE, AXILLARY;  Surgeon: Artemio Sutton MD;  Location: [address];  Service: General;  Laterality: Right;    BREAST BIOPSY      2019    BREAST LUMPECTOMY      2019     SECTION      x 1    OOPHORECTOMY      right     SENTINEL LYMPH NODE BIOPSY Right 2019    Procedure: BIOPSY, LYMPH NODE, SENTINEL;  Surgeon: Artemio Starks MD;  Location: [facility];  Service: General;  Laterality: Right;    splenic artery aneurysm repair      TONSILLECTOMY       Family History   Problem Relation Age of Onset    Diabetes Maternal Grandmother     Diabetes Maternal Grandfather     Diabetes Mother     Hypertension Mother     Sickle cell anemia Daughter     Breast cancer Neg Hx     Colon cancer Neg Hx     Ovarian cancer Neg Hx      Social History     Socioeconomic History    Marital status:      Spouse name: Campos Luther    Number of children: 2   Tobacco Use    Smoking status: Never    Smokeless tobacco: Never   Substance and Sexual Activity    Alcohol use: No    Drug use: No    Sexual activity: Yes     Partners: Male     Birth control/protection:  Post-menopausal     Social Determinants of Health     Financial Resource Strain: Medium Risk    Difficulty of Paying Living Expenses: Somewhat hard   Food Insecurity: No Food Insecurity    Worried About Running Out of Food in the Last Year: Never true    Ran Out of Food in the Last Year: Never true   Transportation Needs: No Transportation Needs    Lack of Transportation (Medical): No    Lack of Transportation (Non-Medical): No   Physical Activity: Inactive    Days of Exercise per Week: 0 days    Minutes of Exercise per Session: 0 min   Stress: Stress Concern Present    Feeling of Stress : Very much   Social Connections: Socially Integrated    Frequency of Communication with Friends and Family: More than three times a week    Frequency of Social Gatherings with Friends and Family: Never    Attends Protestant Services: More than 4 times per year    Active Member of Clubs or Organizations: Yes    Attends Club or Organization Meetings: 1 to 4 times per year    Marital Status:    Housing Stability: Low Risk     Unable to Pay for Housing in the Last Year: No    Number of Places Lived in the Last Year: 1    Unstable Housing in the Last Year: No       Objective:        Physical Exam  Vitals reviewed.   Constitutional:       Appearance: Normal appearance.   HENT:      Head: Normocephalic and atraumatic.      Right Ear: External ear normal.      Left Ear: External ear normal.   Pulmonary:      Effort: Pulmonary effort is normal.   Skin:     General: Skin is warm.   Neurological:      General: No focal deficit present.      Mental Status: She is alert and oriented to person, place, and time.         Results for orders placed or performed during the hospital encounter of 03/09/23   CBC Auto Differential   Result Value Ref Range    WBC 11.54 3.90 - 12.70 K/uL    RBC 5.61 (H) 4.00 - 5.40 M/uL    Hemoglobin 14.0 12.0 - 16.0 g/dL    Hematocrit 43.3 37.0 - 48.5 %    MCV 77 (L) 82 - 98 fL    MCH 25.0 (L) 27.0 - 31.0 pg    MCHC  32.3 32.0 - 36.0 g/dL    RDW 17.0 (H) 11.5 - 14.5 %    Platelets 313 150 - 450 K/uL    MPV 9.4 9.2 - 12.9 fL    Immature Granulocytes 0.3 0.0 - 0.5 %    Gran # (ANC) 7.3 1.8 - 7.7 K/uL    Immature Grans (Abs) 0.04 0.00 - 0.04 K/uL    Lymph # 3.1 1.0 - 4.8 K/uL    Mono # 0.9 0.3 - 1.0 K/uL    Eos # 0.1 0.0 - 0.5 K/uL    Baso # 0.07 0.00 - 0.20 K/uL    nRBC 0 0 /100 WBC    Gran % 63.7 38.0 - 73.0 %    Lymph % 26.8 18.0 - 48.0 %    Mono % 8.1 4.0 - 15.0 %    Eosinophil % 0.5 0.0 - 8.0 %    Basophil % 0.6 0.0 - 1.9 %    Differential Method Automated    Comprehensive Metabolic Panel   Result Value Ref Range    Sodium 139 136 - 145 mmol/L    Potassium 4.3 3.5 - 5.1 mmol/L    Chloride 105 95 - 110 mmol/L    CO2 21 (L) 23 - 29 mmol/L    Glucose 114 (H) 70 - 110 mg/dL    BUN 29 (H) 8 - 23 mg/dL    Creatinine 2.1 (H) 0.5 - 1.4 mg/dL    Calcium 10.2 8.7 - 10.5 mg/dL    Total Protein 8.2 6.0 - 8.4 g/dL    Albumin 3.7 3.5 - 5.2 g/dL    Total Bilirubin 0.4 0.1 - 1.0 mg/dL    Alkaline Phosphatase 131 55 - 135 U/L    AST 13 10 - 40 U/L    ALT 12 10 - 44 U/L    Anion Gap 13 8 - 16 mmol/L    eGFR 25 (A) >60 mL/min/1.73 m^2   BNP   Result Value Ref Range    BNP 29 0 - 99 pg/mL   CK   Result Value Ref Range    CPK 46 20 - 180 U/L   Troponin I   Result Value Ref Range    Troponin I 0.021 0.000 - 0.026 ng/mL   CBC auto differential   Result Value Ref Range    WBC 10.05 3.90 - 12.70 K/uL    RBC 4.98 4.00 - 5.40 M/uL    Hemoglobin 12.3 12.0 - 16.0 g/dL    Hematocrit 40.7 37.0 - 48.5 %    MCV 82 82 - 98 fL    MCH 24.7 (L) 27.0 - 31.0 pg    MCHC 30.2 (L) 32.0 - 36.0 g/dL    RDW 17.6 (H) 11.5 - 14.5 %    Platelets 203 150 - 450 K/uL    MPV 10.1 9.2 - 12.9 fL    Immature Granulocytes 0.3 0.0 - 0.5 %    Gran # (ANC) 5.3 1.8 - 7.7 K/uL    Immature Grans (Abs) 0.03 0.00 - 0.04 K/uL    Lymph # 3.5 1.0 - 4.8 K/uL    Mono # 1.0 0.3 - 1.0 K/uL    Eos # 0.1 0.0 - 0.5 K/uL    Baso # 0.10 0.00 - 0.20 K/uL    nRBC 0 0 /100 WBC    Gran % 53.0 38.0 - 73.0  %    Lymph % 35.2 18.0 - 48.0 %    Mono % 9.5 4.0 - 15.0 %    Eosinophil % 1.0 0.0 - 8.0 %    Basophil % 1.0 0.0 - 1.9 %    Platelet Estimate Appears normal     Aniso Slight     Target Cells Occasional     Tear Drop Cells Occasional     Differential Method Automated    Comprehensive Metabolic Panel   Result Value Ref Range    Sodium 138 136 - 145 mmol/L    Potassium 4.1 3.5 - 5.1 mmol/L    Chloride 109 95 - 110 mmol/L    CO2 16 (L) 23 - 29 mmol/L    Glucose 100 70 - 110 mg/dL    BUN 31 (H) 8 - 23 mg/dL    Creatinine 1.6 (H) 0.5 - 1.4 mg/dL    Calcium 9.0 8.7 - 10.5 mg/dL    Total Protein 6.6 6.0 - 8.4 g/dL    Albumin 3.1 (L) 3.5 - 5.2 g/dL    Total Bilirubin 0.3 0.1 - 1.0 mg/dL    Alkaline Phosphatase 106 55 - 135 U/L    AST 10 10 - 40 U/L    ALT 10 10 - 44 U/L    Anion Gap 13 8 - 16 mmol/L    eGFR 34 (A) >60 mL/min/1.73 m^2   TSH   Result Value Ref Range    TSH 1.691 0.400 - 4.000 uIU/mL   Magnesium   Result Value Ref Range    Magnesium 1.9 1.6 - 2.6 mg/dL   Phosphorus   Result Value Ref Range    Phosphorus 4.2 2.7 - 4.5 mg/dL   Troponin I   Result Value Ref Range    Troponin I 0.015 0.000 - 0.026 ng/mL   Lactic Acid, Plasma   Result Value Ref Range    Lactate (Lactic Acid) 1.1 0.5 - 2.2 mmol/L   Basic Metabolic Panel   Result Value Ref Range    Sodium 138 136 - 145 mmol/L    Potassium 4.1 3.5 - 5.1 mmol/L    Chloride 107 95 - 110 mmol/L    CO2 18 (L) 23 - 29 mmol/L    Glucose 129 (H) 70 - 110 mg/dL    BUN 31 (H) 8 - 23 mg/dL    Creatinine 1.7 (H) 0.5 - 1.4 mg/dL    Calcium 9.1 8.7 - 10.5 mg/dL    Anion Gap 13 8 - 16 mmol/L    eGFR 32 (A) >60 mL/min/1.73 m^2   CBC Auto Differential   Result Value Ref Range    WBC 10.96 3.90 - 12.70 K/uL    RBC 4.72 4.00 - 5.40 M/uL    Hemoglobin 11.8 (L) 12.0 - 16.0 g/dL    Hematocrit 36.8 (L) 37.0 - 48.5 %    MCV 78 (L) 82 - 98 fL    MCH 25.0 (L) 27.0 - 31.0 pg    MCHC 32.1 32.0 - 36.0 g/dL    RDW 16.6 (H) 11.5 - 14.5 %    Platelets 241 150 - 450 K/uL    MPV 9.8 9.2 - 12.9 fL     Immature Granulocytes 0.5 0.0 - 0.5 %    Gran # (ANC) 7.6 1.8 - 7.7 K/uL    Immature Grans (Abs) 0.05 (H) 0.00 - 0.04 K/uL    Lymph # 2.2 1.0 - 4.8 K/uL    Mono # 1.0 0.3 - 1.0 K/uL    Eos # 0.1 0.0 - 0.5 K/uL    Baso # 0.06 0.00 - 0.20 K/uL    nRBC 0 0 /100 WBC    Gran % 69.1 38.0 - 73.0 %    Lymph % 20.0 18.0 - 48.0 %    Mono % 9.4 4.0 - 15.0 %    Eosinophil % 0.5 0.0 - 8.0 %    Basophil % 0.5 0.0 - 1.9 %    Differential Method Automated    Magnesium   Result Value Ref Range    Magnesium 1.8 1.6 - 2.6 mg/dL   Hepatic Function Panel   Result Value Ref Range    Total Protein 6.8 6.0 - 8.4 g/dL    Albumin 3.1 (L) 3.5 - 5.2 g/dL    Total Bilirubin 0.2 0.1 - 1.0 mg/dL    Bilirubin, Direct 0.1 0.1 - 0.3 mg/dL    AST 12 10 - 40 U/L    ALT 12 10 - 44 U/L    Alkaline Phosphatase 108 55 - 135 U/L   Comprehensive Metabolic Panel   Result Value Ref Range    Sodium 139 136 - 145 mmol/L    Potassium 4.0 3.5 - 5.1 mmol/L    Chloride 108 95 - 110 mmol/L    CO2 21 (L) 23 - 29 mmol/L    Glucose 111 (H) 70 - 110 mg/dL    BUN 28 (H) 8 - 23 mg/dL    Creatinine 1.4 0.5 - 1.4 mg/dL    Calcium 9.2 8.7 - 10.5 mg/dL    Total Protein 6.8 6.0 - 8.4 g/dL    Albumin 3.1 (L) 3.5 - 5.2 g/dL    Total Bilirubin 0.2 0.1 - 1.0 mg/dL    Alkaline Phosphatase 108 55 - 135 U/L    AST 12 10 - 40 U/L    ALT 12 10 - 44 U/L    Anion Gap 10 8 - 16 mmol/L    eGFR 40 (A) >60 mL/min/1.73 m^2   Protime-INR   Result Value Ref Range    Prothrombin Time 11.9 9.0 - 12.5 sec    INR 1.1 0.8 - 1.2   CBC Auto Differential   Result Value Ref Range    WBC 9.45 3.90 - 12.70 K/uL    RBC 4.94 4.00 - 5.40 M/uL    Hemoglobin 12.2 12.0 - 16.0 g/dL    Hematocrit 38.7 37.0 - 48.5 %    MCV 78 (L) 82 - 98 fL    MCH 24.7 (L) 27.0 - 31.0 pg    MCHC 31.5 (L) 32.0 - 36.0 g/dL    RDW 17.2 (H) 11.5 - 14.5 %    Platelets 246 150 - 450 K/uL    MPV 9.6 9.2 - 12.9 fL    Immature Granulocytes 0.6 (H) 0.0 - 0.5 %    Gran # (ANC) 5.8 1.8 - 7.7 K/uL    Immature Grans (Abs) 0.06 (H) 0.00 - 0.04  K/uL    Lymph # 2.6 1.0 - 4.8 K/uL    Mono # 0.9 0.3 - 1.0 K/uL    Eos # 0.1 0.0 - 0.5 K/uL    Baso # 0.06 0.00 - 0.20 K/uL    nRBC 0 0 /100 WBC    Gran % 61.0 38.0 - 73.0 %    Lymph % 27.3 18.0 - 48.0 %    Mono % 9.2 4.0 - 15.0 %    Eosinophil % 1.3 0.0 - 8.0 %    Basophil % 0.6 0.0 - 1.9 %    Differential Method Automated    Magnesium   Result Value Ref Range    Magnesium 1.9 1.6 - 2.6 mg/dL   Basic Metabolic Panel   Result Value Ref Range    Sodium 143 136 - 145 mmol/L    Potassium 4.8 3.5 - 5.1 mmol/L    Chloride 110 95 - 110 mmol/L    CO2 19 (L) 23 - 29 mmol/L    Glucose 110 70 - 110 mg/dL    BUN 13 8 - 23 mg/dL    Creatinine 0.9 0.5 - 1.4 mg/dL    Calcium 9.3 8.7 - 10.5 mg/dL    Anion Gap 14 8 - 16 mmol/L    eGFR >60 >60 mL/min/1.73 m^2   CBC Auto Differential   Result Value Ref Range    WBC 9.09 3.90 - 12.70 K/uL    RBC 5.12 4.00 - 5.40 M/uL    Hemoglobin 12.7 12.0 - 16.0 g/dL    Hematocrit 40.4 37.0 - 48.5 %    MCV 79 (L) 82 - 98 fL    MCH 24.8 (L) 27.0 - 31.0 pg    MCHC 31.4 (L) 32.0 - 36.0 g/dL    RDW 17.2 (H) 11.5 - 14.5 %    Platelets 273 150 - 450 K/uL    MPV 10.0 9.2 - 12.9 fL    Immature Granulocytes 0.3 0.0 - 0.5 %    Gran # (ANC) 5.6 1.8 - 7.7 K/uL    Immature Grans (Abs) 0.03 0.00 - 0.04 K/uL    Lymph # 2.5 1.0 - 4.8 K/uL    Mono # 0.8 0.3 - 1.0 K/uL    Eos # 0.1 0.0 - 0.5 K/uL    Baso # 0.06 0.00 - 0.20 K/uL    nRBC 0 0 /100 WBC    Gran % 61.2 38.0 - 73.0 %    Lymph % 27.6 18.0 - 48.0 %    Mono % 9.2 4.0 - 15.0 %    Eosinophil % 1.0 0.0 - 8.0 %    Basophil % 0.7 0.0 - 1.9 %    Differential Method Automated    Magnesium   Result Value Ref Range    Magnesium 1.6 1.6 - 2.6 mg/dL   Basic Metabolic Panel   Result Value Ref Range    Sodium 139 136 - 145 mmol/L    Potassium 3.8 3.5 - 5.1 mmol/L    Chloride 106 95 - 110 mmol/L    CO2 22 (L) 23 - 29 mmol/L    Glucose 145 (H) 70 - 110 mg/dL    BUN 9 8 - 23 mg/dL    Creatinine 0.9 0.5 - 1.4 mg/dL    Calcium 9.4 8.7 - 10.5 mg/dL    Anion Gap 11 8 - 16 mmol/L     eGFR >60 >60 mL/min/1.73 m^2   Magnesium   Result Value Ref Range    Magnesium 1.7 1.6 - 2.6 mg/dL   Basic Metabolic Panel   Result Value Ref Range    Sodium 141 136 - 145 mmol/L    Potassium 4.5 3.5 - 5.1 mmol/L    Chloride 108 95 - 110 mmol/L    CO2 20 (L) 23 - 29 mmol/L    Glucose 113 (H) 70 - 110 mg/dL    BUN 12 8 - 23 mg/dL    Creatinine 1.0 0.5 - 1.4 mg/dL    Calcium 10.0 8.7 - 10.5 mg/dL    Anion Gap 13 8 - 16 mmol/L    eGFR >60 >60 mL/min/1.73 m^2   Magnesium   Result Value Ref Range    Magnesium 1.7 1.6 - 2.6 mg/dL   Basic Metabolic Panel   Result Value Ref Range    Sodium 139 136 - 145 mmol/L    Potassium 3.9 3.5 - 5.1 mmol/L    Chloride 106 95 - 110 mmol/L    CO2 21 (L) 23 - 29 mmol/L    Glucose 115 (H) 70 - 110 mg/dL    BUN 12 8 - 23 mg/dL    Creatinine 1.0 0.5 - 1.4 mg/dL    Calcium 9.3 8.7 - 10.5 mg/dL    Anion Gap 12 8 - 16 mmol/L    eGFR >60 >60 mL/min/1.73 m^2   CBC auto differential   Result Value Ref Range    WBC 11.15 3.90 - 12.70 K/uL    RBC 5.23 4.00 - 5.40 M/uL    Hemoglobin 13.1 12.0 - 16.0 g/dL    Hematocrit 40.6 37.0 - 48.5 %    MCV 78 (L) 82 - 98 fL    MCH 25.0 (L) 27.0 - 31.0 pg    MCHC 32.3 32.0 - 36.0 g/dL    RDW 17.3 (H) 11.5 - 14.5 %    Platelets 210 150 - 450 K/uL    MPV 10.0 9.2 - 12.9 fL    Immature Granulocytes 0.3 0.0 - 0.5 %    Gran # (ANC) 6.7 1.8 - 7.7 K/uL    Immature Grans (Abs) 0.03 0.00 - 0.04 K/uL    Lymph # 3.2 1.0 - 4.8 K/uL    Mono # 0.9 0.3 - 1.0 K/uL    Eos # 0.2 0.0 - 0.5 K/uL    Baso # 0.08 0.00 - 0.20 K/uL    nRBC 0 0 /100 WBC    Gran % 60.3 38.0 - 73.0 %    Lymph % 29.0 18.0 - 48.0 %    Mono % 8.0 4.0 - 15.0 %    Eosinophil % 1.7 0.0 - 8.0 %    Basophil % 0.7 0.0 - 1.9 %    Differential Method Automated    Echo   Result Value Ref Range    BSA 2.58 m2    TDI SEPTAL 0.07 m/s    LV LATERAL E/E' RATIO 5.89 m/s    LV SEPTAL E/E' RATIO 7.57 m/s    LA WIDTH 4.20 cm    IVC diameter 1.5 cm    Left Ventricular Outflow Tract Mean Velocity 1.02 cm/s    Left Ventricular  Outflow Tract Mean Gradient 4.30 mmHg    TV mean gradient 19 mmHg    TDI LATERAL 0.09 m/s    LVIDd 4.41 3.5 - 6.0 cm    IVS 1.60 (A) 0.6 - 1.1 cm    Posterior Wall 1.49 (A) 0.6 - 1.1 cm    Ao root annulus 3.15 cm    LVIDs 2.57 2.1 - 4.0 cm    FS 42 28 - 44 %    LA volume 67.89 cm3    STJ 2.94 cm    Ascending aorta 2.67 cm    LV mass 280.24 g    LA size 3.64 cm    TAPSE 2.00 cm    Left Ventricle Relative Wall Thickness 0.68 cm    AV mean gradient 6 mmHg    AV valve area 2.86 cm2    AV Velocity Ratio 0.79     AV index (prosthetic) 0.91     MV valve area p 1/2 method 2.79 cm2    E/A ratio 0.82     Mean e' 0.08 m/s    E wave deceleration time 272.20 msec    IVRT 76.12 msec    LVOT diameter 2.00 cm    LVOT area 3.1 cm2    LVOT peak henry 1.19 m/s    LVOT peak VTI 28.80 cm    Ao peak henry 1.50 m/s    Ao VTI 31.6 cm    RVOT peak henry 1.04 m/s    RVOT peak VTI 25.1 cm    LVOT stroke volume 90.43 cm3    AV peak gradient 9 mmHg    PV mean gradient 2.75 mmHg    E/E' ratio 6.63 m/s    MV Peak E Henry 0.53 m/s    TR Max Henry 2.60 m/s    MV stenosis pressure 1/2 time 78.94 ms    MV Peak A Henry 0.65 m/s    LV Systolic Volume 23.99 mL    LV Systolic Volume Index 9.8 mL/m2    LV Diastolic Volume 87.94 mL    LV Diastolic Volume Index 35.75 mL/m2    LA Volume Index 27.6 mL/m2    LV Mass Index 114 g/m2    RA Major Axis 3.89 cm    Left Atrium Minor Axis 5.40 cm    Left Atrium Major Axis 5.06 cm    Triscuspid Valve Regurgitation Peak Gradient 27 mmHg    RA Width 3.00 cm    Right Atrial Pressure (from IVC) 3 mmHg    EF 65 %    TV rest pulmonary artery pressure 30 mmHg   POCT glucose   Result Value Ref Range    POCT Glucose 108 70 - 110 mg/dL   POCT glucose   Result Value Ref Range    POCT Glucose 102 70 - 110 mg/dL   POCT glucose   Result Value Ref Range    POCT Glucose 123 (H) 70 - 110 mg/dL   POCT glucose   Result Value Ref Range    POCT Glucose 124 (H) 70 - 110 mg/dL   POCT glucose   Result Value Ref Range    POCT Glucose 119 (H) 70 - 110  mg/dL   POCT glucose   Result Value Ref Range    POCT Glucose 120 (H) 70 - 110 mg/dL   POCT glucose   Result Value Ref Range    POCT Glucose 159 (H) 70 - 110 mg/dL   POCT glucose   Result Value Ref Range    POCT Glucose 111 (H) 70 - 110 mg/dL   POCT glucose   Result Value Ref Range    POCT Glucose 134 (H) 70 - 110 mg/dL   POCT glucose   Result Value Ref Range    POCT Glucose 139 (H) 70 - 110 mg/dL     *Note: Due to a large number of results and/or encounters for the requested time period, some results have not been displayed. A complete set of results can be found in Results Review.       Assessment/Plan:     Mass of upper outer quadrant of right breast  -     US Breast Right Complete; Future; Expected date: 03/17/2023    Major depressive disorder, single episode, moderate with anxious distress  -     Discontinue: ALPRAZolam (XANAX) 0.5 MG tablet; Take 1 tablet (0.5 mg total) by mouth 2 (two) times daily as needed for Anxiety.  Dispense: 180 tablet; Refill: 0  -     ALPRAZolam (XANAX) 0.5 MG tablet; Take 1 tablet (0.5 mg total) by mouth 2 (two) times daily as needed for Anxiety.  Dispense: 180 tablet; Refill: 0    Psychological factors affecting medical condition  -     Discontinue: ALPRAZolam (XANAX) 0.5 MG tablet; Take 1 tablet (0.5 mg total) by mouth 2 (two) times daily as needed for Anxiety.  Dispense: 180 tablet; Refill: 0  -     ALPRAZolam (XANAX) 0.5 MG tablet; Take 1 tablet (0.5 mg total) by mouth 2 (two) times daily as needed for Anxiety.  Dispense: 180 tablet; Refill: 0    Essential hypertension  -     cloNIDine (CATAPRES) 0.1 MG tablet; Take 1 tablet (0.1 mg total) by mouth 2 (two) times daily as needed (BP>170/95).  Dispense: 60 tablet; Refill: 1    Neoplasm related pain  -     Discontinue: oxyCODONE-acetaminophen (PERCOCET) 7.5-325 mg per tablet; Take 1 tablet by mouth every 6 (six) hours as needed for Pain.  Dispense: 60 tablet; Refill: 0  -     oxyCODONE-acetaminophen (PERCOCET) 7.5-325 mg per tablet;  Take 1 tablet by mouth every 6 (six) hours as needed for Pain.  Dispense: 60 tablet; Refill: 0    Bilateral lower extremity edema  -     COMPRESSION STOCKINGS    Wheezing  -     albuterol-ipratropium 2.5 mg-0.5 mg/3 mL nebulizer solution 3 mL    Other orders  -     Discontinue: butalbital-acetaminophen-caffeine -40 mg (FIORICET, ESGIC) -40 mg per tablet; TAKE 1 TABLET DAILY AS NEEDED FOR PAIN OR HEADACHES.  Dispense: 30 tablet; Refill: 1  -     butalbital-acetaminophen-caffeine -40 mg (FIORICET, ESGIC) -40 mg per tablet; TAKE 1 TABLET DAILY AS NEEDED FOR PAIN OR HEADACHES.  Dispense: 30 tablet; Refill: 1  -     pregabalin (LYRICA) 100 MG capsule; Take 1 capsule (100 mg total) by mouth 2 (two) times daily.  Dispense: 60 capsule; Refill: 6  -     levocetirizine (XYZAL) 5 MG tablet; Take 1 tablet (5 mg total) by mouth every evening.  Dispense: 30 tablet; Refill: 0          Follow up as needed     Peace Arias MD  [facility]   Family Medicine

## 2023-03-17 NOTE — PROGRESS NOTES
HPI    Patient was in ICU the last 6 days  Blurred vision due to increase BP.  Referred by hospital to get eyes check.  Last eye exam 06/08/2022 TRF.  Last edited by Jailyn Payan MA on 3/17/2023  9:15 AM.            Assessment /Plan     For exam results, see Encounter Report.    Deep keratitis of both eyes      Worksheet given. Discussed Dry Eyes in detail including Artificial Tears, lubricants, and Omega 3 Fish Oils.    RTC full exam DFE refraction in June

## 2023-03-20 ENCOUNTER — TELEPHONE (OUTPATIENT)
Dept: INTERNAL MEDICINE | Facility: CLINIC | Age: 73
End: 2023-03-20
Payer: MEDICARE

## 2023-03-20 ENCOUNTER — OFFICE VISIT (OUTPATIENT)
Dept: CARDIOLOGY | Facility: CLINIC | Age: 73
End: 2023-03-20
Payer: MEDICARE

## 2023-03-20 ENCOUNTER — LAB VISIT (OUTPATIENT)
Dept: LAB | Facility: HOSPITAL | Age: 73
End: 2023-03-20
Attending: FAMILY MEDICINE
Payer: MEDICARE

## 2023-03-20 VITALS
OXYGEN SATURATION: 97 % | HEIGHT: 69 IN | DIASTOLIC BLOOD PRESSURE: 72 MMHG | WEIGHT: 293 LBS | BODY MASS INDEX: 43.4 KG/M2 | HEART RATE: 93 BPM | SYSTOLIC BLOOD PRESSURE: 105 MMHG

## 2023-03-20 DIAGNOSIS — I71.40 ABDOMINAL AORTIC ANEURYSM (AAA) WITHOUT RUPTURE, UNSPECIFIED PART: ICD-10-CM

## 2023-03-20 DIAGNOSIS — R53.1 RIGHT SIDED WEAKNESS: ICD-10-CM

## 2023-03-20 DIAGNOSIS — I10 PRIMARY HYPERTENSION: ICD-10-CM

## 2023-03-20 DIAGNOSIS — M79.604 RIGHT LEG PAIN: ICD-10-CM

## 2023-03-20 DIAGNOSIS — I26.99 RECURRENT PULMONARY EMBOLI: ICD-10-CM

## 2023-03-20 DIAGNOSIS — I50.32 CHRONIC DIASTOLIC CONGESTIVE HEART FAILURE: ICD-10-CM

## 2023-03-20 DIAGNOSIS — N63.11 MASS OF UPPER OUTER QUADRANT OF RIGHT BREAST: Primary | ICD-10-CM

## 2023-03-20 DIAGNOSIS — R06.02 SOB (SHORTNESS OF BREATH): Primary | ICD-10-CM

## 2023-03-20 DIAGNOSIS — Z86.73 HISTORY OF CVA (CEREBROVASCULAR ACCIDENT): ICD-10-CM

## 2023-03-20 DIAGNOSIS — I10 PRIMARY HYPERTENSION: Primary | ICD-10-CM

## 2023-03-20 DIAGNOSIS — E78.5 HYPERLIPIDEMIA, UNSPECIFIED HYPERLIPIDEMIA TYPE: ICD-10-CM

## 2023-03-20 DIAGNOSIS — N17.9 ACUTE KIDNEY FAILURE, UNSPECIFIED: ICD-10-CM

## 2023-03-20 DIAGNOSIS — R55 SYNCOPE AND COLLAPSE: ICD-10-CM

## 2023-03-20 DIAGNOSIS — E66.01 MORBID OBESITY WITH BMI OF 40.0-44.9, ADULT: ICD-10-CM

## 2023-03-20 DIAGNOSIS — I95.1 ORTHOSTATIC HYPOTENSION: ICD-10-CM

## 2023-03-20 DIAGNOSIS — R60.9 EDEMA, UNSPECIFIED TYPE: ICD-10-CM

## 2023-03-20 LAB
ANION GAP SERPL CALC-SCNC: 6 MMOL/L (ref 8–16)
BASOPHILS # BLD AUTO: 0.09 K/UL (ref 0–0.2)
BASOPHILS NFR BLD: 1 % (ref 0–1.9)
BUN SERPL-MCNC: 16 MG/DL (ref 8–23)
CALCIUM SERPL-MCNC: 9.4 MG/DL (ref 8.7–10.5)
CHLORIDE SERPL-SCNC: 105 MMOL/L (ref 95–110)
CO2 SERPL-SCNC: 30 MMOL/L (ref 23–29)
CREAT SERPL-MCNC: 1.2 MG/DL (ref 0.5–1.4)
DIFFERENTIAL METHOD: ABNORMAL
EOSINOPHIL # BLD AUTO: 0.3 K/UL (ref 0–0.5)
EOSINOPHIL NFR BLD: 2.9 % (ref 0–8)
ERYTHROCYTE [DISTWIDTH] IN BLOOD BY AUTOMATED COUNT: 17.9 % (ref 11.5–14.5)
EST. GFR  (NO RACE VARIABLE): 48.4 ML/MIN/1.73 M^2
GLUCOSE SERPL-MCNC: 91 MG/DL (ref 70–110)
HCT VFR BLD AUTO: 38 % (ref 37–48.5)
HGB BLD-MCNC: 11.8 G/DL (ref 12–16)
IMM GRANULOCYTES # BLD AUTO: 0.02 K/UL (ref 0–0.04)
IMM GRANULOCYTES NFR BLD AUTO: 0.2 % (ref 0–0.5)
LYMPHOCYTES # BLD AUTO: 3.3 K/UL (ref 1–4.8)
LYMPHOCYTES NFR BLD: 37.7 % (ref 18–48)
MCH RBC QN AUTO: 25.4 PG (ref 27–31)
MCHC RBC AUTO-ENTMCNC: 31.1 G/DL (ref 32–36)
MCV RBC AUTO: 82 FL (ref 82–98)
MONOCYTES # BLD AUTO: 0.9 K/UL (ref 0.3–1)
MONOCYTES NFR BLD: 10.8 % (ref 4–15)
NEUTROPHILS # BLD AUTO: 4.1 K/UL (ref 1.8–7.7)
NEUTROPHILS NFR BLD: 47.4 % (ref 38–73)
NRBC BLD-RTO: 0 /100 WBC
PLATELET # BLD AUTO: 299 K/UL (ref 150–450)
PMV BLD AUTO: 11 FL (ref 9.2–12.9)
POTASSIUM SERPL-SCNC: 4.4 MMOL/L (ref 3.5–5.1)
RBC # BLD AUTO: 4.64 M/UL (ref 4–5.4)
SODIUM SERPL-SCNC: 141 MMOL/L (ref 136–145)
WBC # BLD AUTO: 8.65 K/UL (ref 3.9–12.7)

## 2023-03-20 PROCEDURE — 3074F PR MOST RECENT SYSTOLIC BLOOD PRESSURE < 130 MM HG: ICD-10-PCS | Mod: HCNC,CPTII,S$GLB, | Performed by: STUDENT IN AN ORGANIZED HEALTH CARE EDUCATION/TRAINING PROGRAM

## 2023-03-20 PROCEDURE — 1157F PR ADVANCE CARE PLAN OR EQUIV PRESENT IN MEDICAL RECORD: ICD-10-PCS | Mod: HCNC,CPTII,S$GLB, | Performed by: STUDENT IN AN ORGANIZED HEALTH CARE EDUCATION/TRAINING PROGRAM

## 2023-03-20 PROCEDURE — 1157F ADVNC CARE PLAN IN RCRD: CPT | Mod: HCNC,CPTII,S$GLB, | Performed by: STUDENT IN AN ORGANIZED HEALTH CARE EDUCATION/TRAINING PROGRAM

## 2023-03-20 PROCEDURE — 1159F PR MEDICATION LIST DOCUMENTED IN MEDICAL RECORD: ICD-10-PCS | Mod: HCNC,CPTII,S$GLB, | Performed by: STUDENT IN AN ORGANIZED HEALTH CARE EDUCATION/TRAINING PROGRAM

## 2023-03-20 PROCEDURE — 1159F MED LIST DOCD IN RCRD: CPT | Mod: HCNC,CPTII,S$GLB, | Performed by: STUDENT IN AN ORGANIZED HEALTH CARE EDUCATION/TRAINING PROGRAM

## 2023-03-20 PROCEDURE — 99214 PR OFFICE/OUTPT VISIT, EST, LEVL IV, 30-39 MIN: ICD-10-PCS | Mod: HCNC,S$GLB,, | Performed by: STUDENT IN AN ORGANIZED HEALTH CARE EDUCATION/TRAINING PROGRAM

## 2023-03-20 PROCEDURE — 3074F SYST BP LT 130 MM HG: CPT | Mod: HCNC,CPTII,S$GLB, | Performed by: STUDENT IN AN ORGANIZED HEALTH CARE EDUCATION/TRAINING PROGRAM

## 2023-03-20 PROCEDURE — 99214 OFFICE O/P EST MOD 30 MIN: CPT | Mod: HCNC,S$GLB,, | Performed by: STUDENT IN AN ORGANIZED HEALTH CARE EDUCATION/TRAINING PROGRAM

## 2023-03-20 PROCEDURE — 99999 PR PBB SHADOW E&M-EST. PATIENT-LVL IV: ICD-10-PCS | Mod: PBBFAC,HCNC,, | Performed by: STUDENT IN AN ORGANIZED HEALTH CARE EDUCATION/TRAINING PROGRAM

## 2023-03-20 PROCEDURE — 99999 PR PBB SHADOW E&M-EST. PATIENT-LVL IV: CPT | Mod: PBBFAC,HCNC,, | Performed by: STUDENT IN AN ORGANIZED HEALTH CARE EDUCATION/TRAINING PROGRAM

## 2023-03-20 PROCEDURE — 4010F PR ACE/ARB THEARPY RXD/TAKEN: ICD-10-PCS | Mod: HCNC,CPTII,S$GLB, | Performed by: STUDENT IN AN ORGANIZED HEALTH CARE EDUCATION/TRAINING PROGRAM

## 2023-03-20 PROCEDURE — 3288F PR FALLS RISK ASSESSMENT DOCUMENTED: ICD-10-PCS | Mod: HCNC,CPTII,S$GLB, | Performed by: STUDENT IN AN ORGANIZED HEALTH CARE EDUCATION/TRAINING PROGRAM

## 2023-03-20 PROCEDURE — 1126F AMNT PAIN NOTED NONE PRSNT: CPT | Mod: HCNC,CPTII,S$GLB, | Performed by: STUDENT IN AN ORGANIZED HEALTH CARE EDUCATION/TRAINING PROGRAM

## 2023-03-20 PROCEDURE — 4010F ACE/ARB THERAPY RXD/TAKEN: CPT | Mod: HCNC,CPTII,S$GLB, | Performed by: STUDENT IN AN ORGANIZED HEALTH CARE EDUCATION/TRAINING PROGRAM

## 2023-03-20 PROCEDURE — 3008F BODY MASS INDEX DOCD: CPT | Mod: HCNC,CPTII,S$GLB, | Performed by: STUDENT IN AN ORGANIZED HEALTH CARE EDUCATION/TRAINING PROGRAM

## 2023-03-20 PROCEDURE — 1101F PR PT FALLS ASSESS DOC 0-1 FALLS W/OUT INJ PAST YR: ICD-10-PCS | Mod: HCNC,CPTII,S$GLB, | Performed by: STUDENT IN AN ORGANIZED HEALTH CARE EDUCATION/TRAINING PROGRAM

## 2023-03-20 PROCEDURE — 3008F PR BODY MASS INDEX (BMI) DOCUMENTED: ICD-10-PCS | Mod: HCNC,CPTII,S$GLB, | Performed by: STUDENT IN AN ORGANIZED HEALTH CARE EDUCATION/TRAINING PROGRAM

## 2023-03-20 PROCEDURE — 1126F PR PAIN SEVERITY QUANTIFIED, NO PAIN PRESENT: ICD-10-PCS | Mod: HCNC,CPTII,S$GLB, | Performed by: STUDENT IN AN ORGANIZED HEALTH CARE EDUCATION/TRAINING PROGRAM

## 2023-03-20 PROCEDURE — 1111F DSCHRG MED/CURRENT MED MERGE: CPT | Mod: HCNC,CPTII,S$GLB, | Performed by: STUDENT IN AN ORGANIZED HEALTH CARE EDUCATION/TRAINING PROGRAM

## 2023-03-20 PROCEDURE — 1101F PT FALLS ASSESS-DOCD LE1/YR: CPT | Mod: HCNC,CPTII,S$GLB, | Performed by: STUDENT IN AN ORGANIZED HEALTH CARE EDUCATION/TRAINING PROGRAM

## 2023-03-20 PROCEDURE — 36415 COLL VENOUS BLD VENIPUNCTURE: CPT | Mod: HCNC,PO | Performed by: FAMILY MEDICINE

## 2023-03-20 PROCEDURE — 85025 COMPLETE CBC W/AUTO DIFF WBC: CPT | Mod: HCNC | Performed by: FAMILY MEDICINE

## 2023-03-20 PROCEDURE — 3078F DIAST BP <80 MM HG: CPT | Mod: HCNC,CPTII,S$GLB, | Performed by: STUDENT IN AN ORGANIZED HEALTH CARE EDUCATION/TRAINING PROGRAM

## 2023-03-20 PROCEDURE — 3288F FALL RISK ASSESSMENT DOCD: CPT | Mod: HCNC,CPTII,S$GLB, | Performed by: STUDENT IN AN ORGANIZED HEALTH CARE EDUCATION/TRAINING PROGRAM

## 2023-03-20 PROCEDURE — 3078F PR MOST RECENT DIASTOLIC BLOOD PRESSURE < 80 MM HG: ICD-10-PCS | Mod: HCNC,CPTII,S$GLB, | Performed by: STUDENT IN AN ORGANIZED HEALTH CARE EDUCATION/TRAINING PROGRAM

## 2023-03-20 PROCEDURE — 1111F PR DISCHARGE MEDS RECONCILED W/ CURRENT OUTPATIENT MED LIST: ICD-10-PCS | Mod: HCNC,CPTII,S$GLB, | Performed by: STUDENT IN AN ORGANIZED HEALTH CARE EDUCATION/TRAINING PROGRAM

## 2023-03-20 PROCEDURE — 80048 BASIC METABOLIC PNL TOTAL CA: CPT | Mod: HCNC | Performed by: FAMILY MEDICINE

## 2023-03-20 NOTE — PROGRESS NOTES
Subjective:   Patient ID:  Susu Luther is a 73 y.o. female who presents for follow up of Shortness of Breath      12/1/20  71 yo female, care establish. Prior cardiologist Dr ackerman   [facility] HTN, CVA (2009), Right breast CA, Lumpectomy 3/2019, h/o PE off OAC 2 yrs ago.  AAA, s/p transcutaneous patch by Dr. Bonds, obesity knee OA imbalanced walker dependent  C/o SOB after walking few steps and dizziness. Had vision issue 1 month ago due to uncontrolled HTN  No chest pain  Sleeps with 2 pillows  Decent appetites  Leg calf pain worse at night  No smoking/drinking  ekg today NSR LVH.    ECH normal EF, grade II DD, LAE and PAP 56 mmHG   Chest CTA negative for PE  BP high    A1c controlled    S/p Open subpectoral lymph node biopsy on the right on 12/02/2020 by Dr. Starks  Still right chest, under arm and shoudler supersensitive pain      Shortness of Breath  Associated symptoms include chest pain. Pertinent negatives include no abdominal pain, claudication, fever, headaches, neck pain, orthopnea, PND, rash, sputum production, syncope, vomiting or wheezing.   Chest Pain   Pertinent negatives include no abdominal pain, back pain, claudication, cough, diaphoresis, dizziness, fever, headaches, irregular heartbeat, malaise/fatigue, nausea, near-syncope, numbness, orthopnea, palpitations, PND, sputum production, syncope, vomiting or weakness.   Her past medical history is significant for CHF.   Pertinent negatives for past medical history include no seizures.   Congestive Heart Failure  Associated symptoms include chest pain. Pertinent negatives include no abdominal pain, claudication, near-syncope or palpitations.   2/28/22  Patient was admitted to [facility] for shortness of breath.  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  Started on heparin drip in transition to oral anticoagulation, now on Eliquis.  Recurrent PE.  On Femara. Has another lump in  breast on right side, recently seen on PET scan.   Due to TANNER, was told to stop hctz, telmisartan.  She has ran out of clonidine. Does not take the ASA, hydralazine, amlodipine.   Blood pressure normotensive.  Reports severe headaches.  Has been out of Fioricet.  Echocardiogram with normal EF  Reports shortness of breath, chest heaviness mostly right-sided.      3/14/22  Still having SOB due to PE.  No bleeding issues while on eliquis.  Chest pain is substernal to right sided.   Lasix doesn't seem to help.   A reports intermittent leg pain right > left.  DVT ultrasound in-hospital with no evidence of DVT.  Following Hematology-Oncology.  Patient does not want surgery if needed for possible breast cancer.  Denies syncope, fever, chills.         4/18/22  Comes in with daughter who lives in [place].  Concerned that she may oxygen issues and may need home oxygen. O2 98%  Reports LE swelling and SOB  Reports chest pain comes on with SOB, did not have chest pain prior to PE  Has not been on lasix, has been held  Reports balancing issues- can do PT once symptoms improve   Denies syncope, fever, chills.       5/16/22  Has been feeling weak last couple days  Feels chest tightness with walking, also CURIEL- feels worse than before. 97% O2 in clinic   Reports dizziness after taking medications   BP low today   Says recurrent cancer may be spreading   No bleeding on eliquis   Reports orthopnea, PND.  Not feeling well- Does not want to the hospital     Denies syncope.      6/13/22  Not feeling well today  Reports chest pain and SOB worsening over the last 2 weeks   Ddimer trending, downTroponin neg and BNP nl on 5/16/22  EKG today without significant abnormalities   Reports recent diagnosis of breast cancer is progressing  Reports right-sided arm weakness  Patient has been increasingly stressed    Denies syncope, lower extremity swelling.      7/6/2022   went to emergency room 6/13/2022, was worked up for stroke  MRI brain  negative  Repeat CTA chest without PE, right-sided mass 6 cm, stable  All blood pressure medications.  Due to hypotension  Started taking Lasix today  Has significant lower extremity pain bilateral, reports blue feet at times      8/9/22  Virtual visit  Ambulates with walker   Had a fall recently due to syncopal episode, went to urgent care, negative workup per patient  Syncopal event occurred while standing up  Last visit Lasix was increased, patient reports increased thirst and water intake  Has also been taking carvedilol, reports low blood pressure  Chest pain worsened after syncopal event, has bruising on her body      9/7/22  Reports leg swelling, left  Has been taking eliquis also   Restarted taking lasix 2 weeks ago  Recently started on lyrica   Still wearing cardiac monitor  Still having SOB and chest discomfort  U/S arterial w/o significant stenosis   BP elevated, high at home  Lost 8 lbs since 9/1      10/12/22  Virtual visit  Doing well, still getting chest pain   Still has shortness of breath but has improved   Was able to go to a football game without any issues   DVT ultrasound without thrombus   Has been treated for gout, improvement of toe pain  Has been having intermittent blood pressure elevated readings at home        2/20/23  Stress test normal   BP elevated now  Has been more tired  Drinking lots of water  Chest pain has improved with imdur  SOB stable   Has chronic pain and chronic fatigue         3/21/23  Patient was recently admitted to the hospital for right arm weakness, TANNER, chest pain, shortness of breath   MRI/CT scan of the brain were unremarkable   CTA did not show PE   Had elevated creatinine, renal ultrasound did not show arterial stenosis  BP noted to be significantly elevated   Blood pressure meds were changed, patient only taking Entresto and amlodipine   Coreg was stopped  Reports right-sided breast lump/mass currently being investigated    Today, reports still having shortness  of breath  Waiting to have ultrasound done for right breast lump  Blood pressure stable today      EKG 2/20/23 NSR, PVCs, LAD, minimal LVH  EKG 6/13/22 NSR, LAD, LVH, 93 bpm, qtc 455 ms  EKG 5/16/22 SB, incomplete RBBB, LVH, septal infarct, qtc 422 ms   Echo 2/28/22  The left ventricle is normal in size with concentric hypertrophy and normal systolic function.  The estimated ejection fraction is 60%.  Normal left ventricular diastolic function.  Normal right ventricular size with normal right ventricular systolic function.  Mild to moderate tricuspid regurgitation.  Normal central venous pressure (3 mmHg).  The estimated PA systolic pressure is 36 mmHg.       Echo 2019  CONCLUSIONS     1 - Mild left atrial enlargement.     2 - Concentric hypertrophy.     3 - No wall motion abnormalities.     4 - Normal left ventricular systolic function (EF 60-65%).     5 - Impaired LV relaxation, elevated LAP (grade 2 diastolic dysfunction).     6 - Normal right ventricular systolic function .     7 - The estimated PA systolic pressure is 36 mmHg.     8 - Mild tricuspid regurgitation.        Past Medical History:   Diagnosis Date    Cataract     Bilateral    Chronic diastolic congestive heart failure 08/25/2020    Dizziness 03/12/2023    Encounter for blood transfusion     History of repair of aneurysm of abdominal aorta using endovascular stent graft     DR Bonds    Hx of psychiatric care     Hypertension     Major depressive disorder, single episode, moderate with anxious distress 01/14/2020    Malignant neoplasm of upper-outer quadrant of right breast in female, estrogen receptor positive 03/14/2019    radiation    Psychiatric problem     Sleep apnea     Sleep difficulties     Stroke 2009    no residual defect    Therapy        Past Surgical History:   Procedure Laterality Date    APPENDECTOMY      AXILLARY NODE DISSECTION Right 12/02/2020    Procedure: LYMPHADENECTOMY, AXILLARY;  Surgeon: Artemio Starks MD;  Location: [facility]  OR;  Service: General;  Laterality: Right;    BIOPSY OF AXILLARY NODE Right 2021    Procedure: BIOPSY, LYMPH NODE, AXILLARY;  Surgeon: Artemio Sutton MD;  Location: [address];  Service: General;  Laterality: Right;    BREAST BIOPSY      2019    BREAST LUMPECTOMY      2019     SECTION      x 1    OOPHORECTOMY      right     SENTINEL LYMPH NODE BIOPSY Right 2019    Procedure: BIOPSY, LYMPH NODE, SENTINEL;  Surgeon: Artemio Starks MD;  Location: [facility];  Service: General;  Laterality: Right;    splenic artery aneurysm repair      TONSILLECTOMY         Social History     Tobacco Use    Smoking status: Never    Smokeless tobacco: Never   Substance Use Topics    Alcohol use: No    Drug use: No       Family History   Problem Relation Age of Onset    Diabetes Maternal Grandmother     Diabetes Maternal Grandfather     Diabetes Mother     Hypertension Mother     Sickle cell anemia Daughter     Breast cancer Neg Hx     Colon cancer Neg Hx     Ovarian cancer Neg Hx          Review of Systems   Constitutional: Negative for decreased appetite, diaphoresis, fever, malaise/fatigue and night sweats.   HENT:  Negative for nosebleeds.    Eyes:  Negative for blurred vision and double vision.   Cardiovascular:  Positive for chest pain and dyspnea on exertion. Negative for claudication, irregular heartbeat, near-syncope, orthopnea, palpitations, paroxysmal nocturnal dyspnea and syncope.   Respiratory:  Negative for cough, sleep disturbances due to breathing, snoring, sputum production and wheezing.    Endocrine: Negative for cold intolerance and polyuria.   Hematologic/Lymphatic: Does not bruise/bleed easily.   Skin:  Negative for rash.   Musculoskeletal:  Negative for back pain, falls, joint pain, joint swelling and neck pain.        Right chest breast and shoulder pain   Gastrointestinal:  Negative for abdominal pain, heartburn, nausea and vomiting.   Genitourinary:  Negative for dysuria, frequency and  "hematuria.   Neurological:  Negative for difficulty with concentration, dizziness, focal weakness, headaches, light-headedness, numbness, seizures and weakness.   Psychiatric/Behavioral:  Negative for depression, memory loss and substance abuse. The patient does not have insomnia.    Allergic/Immunologic: Negative for HIV exposure and hives.   Vitals:    03/20/23 1646   BP: 105/72   BP Location: Left arm   Patient Position: Sitting   BP Method: Large (Automatic)   Pulse: 93   SpO2: 97%   Weight: (!) 138 kg (304 lb 3.8 oz)   Height: 5' 9" (1.753 m)           Objective:   Physical Exam  Constitutional:       Comments: Mild distress.    HENT:      Head: Normocephalic.   Eyes:      Pupils: Pupils are equal, round, and reactive to light.   Neck:      Thyroid: No thyromegaly.      Vascular: Normal carotid pulses. No carotid bruit or JVD.   Cardiovascular:      Rate and Rhythm: Normal rate and regular rhythm. No extrasystoles are present.     Chest Wall: PMI is not displaced.      Pulses: Normal pulses.      Heart sounds: Normal heart sounds. No murmur heard.    No gallop. No S3 sounds.   Pulmonary:      Effort: No respiratory distress.      Breath sounds: Normal breath sounds. No stridor.   Abdominal:      General: Bowel sounds are normal.      Palpations: Abdomen is soft.      Tenderness: There is no abdominal tenderness. There is no rebound.   Musculoskeletal:         General: Normal range of motion.      Comments: Tender to palpitation   Skin:     Findings: No rash.   Neurological:      Mental Status: She is alert and oriented to person, place, and time.   Psychiatric:         Behavior: Behavior normal.       Lab Results   Component Value Date    CHOL 234 (H) 02/28/2023    CHOL 229 (H) 04/18/2022    CHOL 221 (H) 10/19/2020     Lab Results   Component Value Date    HDL 44 02/28/2023    HDL 45 04/18/2022    HDL 55 10/19/2020     Lab Results   Component Value Date    LDLCALC 151.8 02/28/2023    LDLCALC 150.4 04/18/2022    " LDLCALC 137.4 10/19/2020     Lab Results   Component Value Date    TRIG 191 (H) 02/28/2023    TRIG 168 (H) 04/18/2022    TRIG 143 10/19/2020     Lab Results   Component Value Date    CHOLHDL 18.8 (L) 02/28/2023    CHOLHDL 19.7 (L) 04/18/2022    CHOLHDL 24.9 10/19/2020       Chemistry        Component Value Date/Time     03/15/2023 0442    K 3.9 03/15/2023 0442     03/15/2023 0442    CO2 21 (L) 03/15/2023 0442    BUN 12 03/15/2023 0442    CREATININE 1.0 03/15/2023 0442     (H) 03/15/2023 0442        Component Value Date/Time    CALCIUM 9.3 03/15/2023 0442    ALKPHOS 108 03/11/2023 0422    ALKPHOS 108 03/11/2023 0422    AST 12 03/11/2023 0422    AST 12 03/11/2023 0422    ALT 12 03/11/2023 0422    ALT 12 03/11/2023 0422    BILITOT 0.2 03/11/2023 0422    BILITOT 0.2 03/11/2023 0422    ESTGFRAFRICA 37 (A) 07/06/2022 1312    EGFRNONAA 32 (A) 07/06/2022 1312          Lab Results   Component Value Date    HGBA1C 5.9 (H) 05/18/2019     Lab Results   Component Value Date    TSH 1.691 03/10/2023     Lab Results   Component Value Date    INR 1.1 03/11/2023    INR 0.9 02/15/2022    INR 1.0 11/10/2020     Lab Results   Component Value Date    WBC 11.15 03/15/2023    HGB 13.1 03/15/2023    HCT 40.6 03/15/2023    MCV 78 (L) 03/15/2023     03/15/2023     BMP  Sodium   Date Value Ref Range Status   03/15/2023 139 136 - 145 mmol/L Final     Potassium   Date Value Ref Range Status   03/15/2023 3.9 3.5 - 5.1 mmol/L Final     Chloride   Date Value Ref Range Status   03/15/2023 106 95 - 110 mmol/L Final     CO2   Date Value Ref Range Status   03/15/2023 21 (L) 23 - 29 mmol/L Final     BUN   Date Value Ref Range Status   03/15/2023 12 8 - 23 mg/dL Final     Creatinine   Date Value Ref Range Status   03/15/2023 1.0 0.5 - 1.4 mg/dL Final     Calcium   Date Value Ref Range Status   03/15/2023 9.3 8.7 - 10.5 mg/dL Final     Anion Gap   Date Value Ref Range Status   03/15/2023 12 8 - 16 mmol/L Final     eGFR if African  American   Date Value Ref Range Status   07/06/2022 37 (A) >60 mL/min/1.73 m^2 Final     eGFR if non    Date Value Ref Range Status   07/06/2022 32 (A) >60 mL/min/1.73 m^2 Final     Comment:     Calculation used to obtain the estimated glomerular filtration  rate (eGFR) is the CKD-EPI equation.        BNP  @LABRCNTIP(BNP,BNPTRIAGEBLO)@  @LABRCNTIP(troponini)@  Estimated Creatinine Clearance: 77.3 mL/min (based on SCr of 1 mg/dL).  No results found in the last 24 hours.  No results found in the last 24 hours.  No results found in the last 24 hours.    Assessment:      1. SOB (shortness of breath)    2. Primary hypertension    3. Chronic diastolic congestive heart failure    4. Morbid obesity with BMI of 40.0-44.9, adult    5. Edema, unspecified type    6. History of CVA (cerebrovascular accident)    7. Hyperlipidemia, unspecified hyperlipidemia type    8. Recurrent pulmonary emboli    9. Right sided weakness    10. Right leg pain    11. Abdominal aortic aneurysm (AAA) without rupture, unspecified part    12. Syncope and collapse          Plan:   Hypertension  Stable but significant fluctuations  Carvedilol, hydralazine stopped  Continue Entresto, amlodipine   Check blood pressure labs, cortisol    Left leg swelling/edema  Continue lasix 20 mg b.i.d.  Venous ultrasound 10/22 without DVT    Chest pain/shortness of breath  CTA 3/23 did not show PE   Lexiscan 1/23 normal stress test     Syncope- resolved   Possibly due to dehydration  14 day monitor- 7.27% PACs, essentially asymptomatic    History of right breast cancer  Enlarging lymph nodes that could be causing significant pain/discomfort  Follows with Hematology-Oncology    Diastolic heart failure  Echo 3/23 with normal EF, mild-mod TR    Right leg pain-stable  DVT ultrasound 10/22 without evidence of DVT   Normal ABIs  Arterial ultrasound 8/22 without significant stenosis    Recurrent pulmonary embolus   Recurrent PE as of 2/22  Continue  OAC    History of CVA  Carotid ultrasound no significant stenosis, recent MRI brain no abnormality  Currently not on aspirin as she is taking Eliquis  Continue statin    HLD   as of 2/23  Continue statin    AAA s/p transcutaneous patch  Stable    Morbid obesity, BMI > 40  Low-salt, low-fat diet  Exercise as tolerated      All labs and cardiac procedures reviewed.      Return to clinic 1 month    Arlene Hobbs MD  Cardiology Staff

## 2023-03-20 NOTE — TELEPHONE ENCOUNTER
----- Message from David Jones sent at 3/20/2023 10:01 AM CDT -----  Good Morning,    This patient has a breast US scheduled tomorrow at [facility] . However, due to the patients diagnosis in 2019 she will need a diagnostic mammogram to go along with the US tomorrow. Would someone please put in an order for the diagnostic mammogram so that I may get this patient scheduled correctly?    If you have any questions please contact our radiology department at [phone].    Thank you,  David  Radiology Dept.

## 2023-03-22 ENCOUNTER — PATIENT MESSAGE (OUTPATIENT)
Dept: INTERNAL MEDICINE | Facility: CLINIC | Age: 73
End: 2023-03-22
Payer: MEDICARE

## 2023-03-22 ENCOUNTER — LAB VISIT (OUTPATIENT)
Dept: LAB | Facility: HOSPITAL | Age: 73
End: 2023-03-22
Attending: STUDENT IN AN ORGANIZED HEALTH CARE EDUCATION/TRAINING PROGRAM
Payer: MEDICARE

## 2023-03-22 DIAGNOSIS — I71.40 ABDOMINAL AORTIC ANEURYSM (AAA) WITHOUT RUPTURE, UNSPECIFIED PART: ICD-10-CM

## 2023-03-22 DIAGNOSIS — R53.1 RIGHT SIDED WEAKNESS: ICD-10-CM

## 2023-03-22 DIAGNOSIS — Z86.73 HISTORY OF CVA (CEREBROVASCULAR ACCIDENT): ICD-10-CM

## 2023-03-22 DIAGNOSIS — I50.32 CHRONIC DIASTOLIC CONGESTIVE HEART FAILURE: ICD-10-CM

## 2023-03-22 DIAGNOSIS — R60.9 EDEMA, UNSPECIFIED TYPE: ICD-10-CM

## 2023-03-22 DIAGNOSIS — M79.604 RIGHT LEG PAIN: ICD-10-CM

## 2023-03-22 DIAGNOSIS — R06.02 SOB (SHORTNESS OF BREATH): ICD-10-CM

## 2023-03-22 DIAGNOSIS — E78.5 HYPERLIPIDEMIA, UNSPECIFIED HYPERLIPIDEMIA TYPE: ICD-10-CM

## 2023-03-22 DIAGNOSIS — G89.3 NEOPLASM RELATED PAIN: ICD-10-CM

## 2023-03-22 DIAGNOSIS — I10 PRIMARY HYPERTENSION: ICD-10-CM

## 2023-03-22 DIAGNOSIS — R55 SYNCOPE AND COLLAPSE: ICD-10-CM

## 2023-03-22 DIAGNOSIS — E66.01 MORBID OBESITY WITH BMI OF 40.0-44.9, ADULT: ICD-10-CM

## 2023-03-22 DIAGNOSIS — I26.99 RECURRENT PULMONARY EMBOLI: ICD-10-CM

## 2023-03-22 LAB
BNP SERPL-MCNC: 76 PG/ML (ref 0–99)
CORTIS SERPL-MCNC: 9.1 UG/DL (ref 4.3–22.4)

## 2023-03-22 PROCEDURE — 82533 TOTAL CORTISOL: CPT | Mod: HCNC | Performed by: STUDENT IN AN ORGANIZED HEALTH CARE EDUCATION/TRAINING PROGRAM

## 2023-03-22 PROCEDURE — 83880 ASSAY OF NATRIURETIC PEPTIDE: CPT | Mod: HCNC | Performed by: STUDENT IN AN ORGANIZED HEALTH CARE EDUCATION/TRAINING PROGRAM

## 2023-03-22 PROCEDURE — 83835 ASSAY OF METANEPHRINES: CPT | Mod: HCNC | Performed by: STUDENT IN AN ORGANIZED HEALTH CARE EDUCATION/TRAINING PROGRAM

## 2023-03-22 PROCEDURE — 84244 ASSAY OF RENIN: CPT | Mod: HCNC | Performed by: STUDENT IN AN ORGANIZED HEALTH CARE EDUCATION/TRAINING PROGRAM

## 2023-03-22 PROCEDURE — 82384 ASSAY THREE CATECHOLAMINES: CPT | Mod: HCNC | Performed by: STUDENT IN AN ORGANIZED HEALTH CARE EDUCATION/TRAINING PROGRAM

## 2023-03-23 ENCOUNTER — TELEPHONE (OUTPATIENT)
Dept: CARDIOLOGY | Facility: CLINIC | Age: 73
End: 2023-03-23
Payer: MEDICARE

## 2023-03-23 NOTE — TELEPHONE ENCOUNTER
Spoke with patient gave normal BNP lab work.  Patient verbalized understanding.        ----- Message from Fern Martell LPN sent at 3/23/2023  7:58 AM CDT -----    ----- Message -----  From: Ruchi Slade MD  Sent: 3/23/2023   7:56 AM CDT  To: Yany HOOPER Staff    Labs are normal

## 2023-03-24 ENCOUNTER — PATIENT MESSAGE (OUTPATIENT)
Dept: INTERNAL MEDICINE | Facility: CLINIC | Age: 73
End: 2023-03-24
Payer: MEDICARE

## 2023-03-24 RX ORDER — OXYCODONE AND ACETAMINOPHEN 7.5; 325 MG/1; MG/1
1 TABLET ORAL EVERY 6 HOURS PRN
Qty: 60 TABLET | Refills: 0 | Status: SHIPPED | OUTPATIENT
Start: 2023-03-24 | End: 2023-05-08 | Stop reason: SDUPTHER

## 2023-03-24 RX ORDER — LIDOCAINE 50 MG/G
2 PATCH TOPICAL DAILY
Qty: 90 PATCH | Refills: 1 | Status: SHIPPED | OUTPATIENT
Start: 2023-03-24 | End: 2023-05-17 | Stop reason: SDUPTHER

## 2023-03-24 RX ORDER — BUSPIRONE HYDROCHLORIDE 15 MG/1
15 TABLET ORAL 3 TIMES DAILY PRN
Qty: 270 TABLET | Refills: 0 | Status: SHIPPED | OUTPATIENT
Start: 2023-03-24 | End: 2023-05-17 | Stop reason: SDUPTHER

## 2023-03-24 RX ORDER — BUTALBITAL, ACETAMINOPHEN AND CAFFEINE 50; 325; 40 MG/1; MG/1; MG/1
TABLET ORAL
Qty: 30 TABLET | Refills: 1 | Status: SHIPPED | OUTPATIENT
Start: 2023-03-24 | End: 2023-04-28 | Stop reason: SDUPTHER

## 2023-03-26 LAB
ALDOST SERPL-MCNC: 15 NG/DL
ALDOST/RENIN PLAS-RTO: 0.6 RATIO
RENIN PLAS-CCNC: 26.1 NG/ML/HR

## 2023-03-31 LAB
METANEPH FREE SERPL-MCNC: 73 PG/ML
METANEPHS SERPL-MCNC: 788 PG/ML
NORMETANEPH FREE SERPL-MCNC: 715 PG/ML

## 2023-04-01 DIAGNOSIS — I10 PRIMARY HYPERTENSION: Primary | ICD-10-CM

## 2023-04-10 RX ORDER — AMLODIPINE BESYLATE 10 MG/1
10 TABLET ORAL DAILY
Qty: 30 TABLET | Refills: 0 | OUTPATIENT
Start: 2023-04-10 | End: 2023-05-10

## 2023-04-11 ENCOUNTER — PATIENT MESSAGE (OUTPATIENT)
Dept: CARDIOLOGY | Facility: CLINIC | Age: 73
End: 2023-04-11
Payer: MEDICARE

## 2023-04-12 RX ORDER — LEVOCETIRIZINE DIHYDROCHLORIDE 5 MG/1
5 TABLET, FILM COATED ORAL NIGHTLY
Qty: 90 TABLET | Refills: 0 | Status: SHIPPED | OUTPATIENT
Start: 2023-04-12 | End: 2023-04-13 | Stop reason: SDUPTHER

## 2023-04-12 NOTE — TELEPHONE ENCOUNTER
No new care gaps identified.  [facility] Embedded Care Gaps. Reference number: [id number]. 4/12/2023   5:08:56 AM BRANDYT

## 2023-04-12 NOTE — TELEPHONE ENCOUNTER
Refill Routing Note   Medication(s) are not appropriate for processing by [facility] for the following reason(s):      New or recently adjusted medication: ordered 3/17/23    OR action(s):  Defer            Appointments  past 12m or future 3m with PCP    Date Provider   Last Visit   3/17/2023 Peace Arias MD   Next Visit   5/8/2023 Peace Arias MD   ED visits in past 90 days: 0        Note composed:10:31 AM 04/12/2023

## 2023-04-13 ENCOUNTER — PATIENT MESSAGE (OUTPATIENT)
Dept: INTERNAL MEDICINE | Facility: CLINIC | Age: 73
End: 2023-04-13
Payer: MEDICARE

## 2023-04-13 DIAGNOSIS — G89.3 NEOPLASM RELATED PAIN: Primary | ICD-10-CM

## 2023-04-13 DIAGNOSIS — Z17.0 MALIGNANT NEOPLASM OF UPPER-OUTER QUADRANT OF RIGHT BREAST IN FEMALE, ESTROGEN RECEPTOR POSITIVE: ICD-10-CM

## 2023-04-13 DIAGNOSIS — C50.411 MALIGNANT NEOPLASM OF UPPER-OUTER QUADRANT OF RIGHT BREAST IN FEMALE, ESTROGEN RECEPTOR POSITIVE: ICD-10-CM

## 2023-04-13 RX ORDER — LEVOCETIRIZINE DIHYDROCHLORIDE 5 MG/1
5 TABLET, FILM COATED ORAL NIGHTLY
Qty: 90 TABLET | Refills: 0 | Status: SHIPPED | OUTPATIENT
Start: 2023-04-13 | End: 2023-04-17 | Stop reason: SDUPTHER

## 2023-04-13 RX ORDER — PREGABALIN 100 MG/1
100 CAPSULE ORAL 2 TIMES DAILY
Qty: 60 CAPSULE | Refills: 6 | Status: SHIPPED | OUTPATIENT
Start: 2023-04-13 | End: 2023-04-17 | Stop reason: SDUPTHER

## 2023-04-13 NOTE — TELEPHONE ENCOUNTER
No new care gaps identified.  [facility] Embedded Care Gaps. Reference number: [id number]. 4/13/2023   4:37:05 PM CDT

## 2023-04-17 ENCOUNTER — EXTERNAL HOME HEALTH (OUTPATIENT)
Dept: HOME HEALTH SERVICES | Facility: HOSPITAL | Age: 73
End: 2023-04-17
Payer: MEDICARE

## 2023-04-17 LAB
CATECHOLS PLAS-MCNC: 2736 PG/ML
DOPAMINE SERPL-MCNC: <40 PG/ML
EPINEPH PLAS-MCNC: <80 PG/ML
NOREPINEPH PLAS-MCNC: 2736 PG/ML

## 2023-04-17 RX ORDER — PREGABALIN 100 MG/1
100 CAPSULE ORAL 2 TIMES DAILY
Qty: 60 CAPSULE | Refills: 6 | Status: SHIPPED | OUTPATIENT
Start: 2023-04-17 | End: 2023-04-28 | Stop reason: SDUPTHER

## 2023-04-17 RX ORDER — LEVOCETIRIZINE DIHYDROCHLORIDE 5 MG/1
5 TABLET, FILM COATED ORAL NIGHTLY
Qty: 90 TABLET | Refills: 0 | Status: SHIPPED | OUTPATIENT
Start: 2023-04-17 | End: 2023-05-17 | Stop reason: SDUPTHER

## 2023-04-20 ENCOUNTER — PATIENT MESSAGE (OUTPATIENT)
Dept: ADMINISTRATIVE | Facility: HOSPITAL | Age: 73
End: 2023-04-20
Payer: MEDICARE

## 2023-04-20 ENCOUNTER — TELEPHONE (OUTPATIENT)
Dept: ADMINISTRATIVE | Facility: HOSPITAL | Age: 73
End: 2023-04-20
Payer: MEDICARE

## 2023-04-21 ENCOUNTER — DOCUMENT SCAN (OUTPATIENT)
Dept: HOME HEALTH SERVICES | Facility: HOSPITAL | Age: 73
End: 2023-04-21
Payer: MEDICARE

## 2023-04-24 ENCOUNTER — PATIENT MESSAGE (OUTPATIENT)
Dept: CARDIOLOGY | Facility: CLINIC | Age: 73
End: 2023-04-24
Payer: MEDICARE

## 2023-04-24 DIAGNOSIS — C50.411 MALIGNANT NEOPLASM OF UPPER-OUTER QUADRANT OF RIGHT BREAST IN FEMALE, ESTROGEN RECEPTOR POSITIVE: Chronic | ICD-10-CM

## 2023-04-24 DIAGNOSIS — Z17.0 MALIGNANT NEOPLASM OF UPPER-OUTER QUADRANT OF RIGHT BREAST IN FEMALE, ESTROGEN RECEPTOR POSITIVE: Chronic | ICD-10-CM

## 2023-04-25 ENCOUNTER — OFFICE VISIT (OUTPATIENT)
Dept: CARDIOLOGY | Facility: CLINIC | Age: 73
End: 2023-04-25
Payer: MEDICARE

## 2023-04-25 DIAGNOSIS — I50.32 CHRONIC DIASTOLIC CONGESTIVE HEART FAILURE: ICD-10-CM

## 2023-04-25 DIAGNOSIS — I71.40 ABDOMINAL AORTIC ANEURYSM (AAA) WITHOUT RUPTURE, UNSPECIFIED PART: ICD-10-CM

## 2023-04-25 DIAGNOSIS — R07.9 CHEST PAIN, UNSPECIFIED TYPE: ICD-10-CM

## 2023-04-25 DIAGNOSIS — R60.9 EDEMA, UNSPECIFIED TYPE: ICD-10-CM

## 2023-04-25 DIAGNOSIS — E78.5 HYPERLIPIDEMIA, UNSPECIFIED HYPERLIPIDEMIA TYPE: ICD-10-CM

## 2023-04-25 DIAGNOSIS — R55 SYNCOPE AND COLLAPSE: ICD-10-CM

## 2023-04-25 DIAGNOSIS — Z17.0 MALIGNANT NEOPLASM OF UPPER-OUTER QUADRANT OF RIGHT BREAST IN FEMALE, ESTROGEN RECEPTOR POSITIVE: ICD-10-CM

## 2023-04-25 DIAGNOSIS — R06.02 SOB (SHORTNESS OF BREATH): ICD-10-CM

## 2023-04-25 DIAGNOSIS — Z86.73 HISTORY OF CVA (CEREBROVASCULAR ACCIDENT): ICD-10-CM

## 2023-04-25 DIAGNOSIS — E66.01 MORBID OBESITY WITH BMI OF 40.0-44.9, ADULT: ICD-10-CM

## 2023-04-25 DIAGNOSIS — C50.411 MALIGNANT NEOPLASM OF UPPER-OUTER QUADRANT OF RIGHT BREAST IN FEMALE, ESTROGEN RECEPTOR POSITIVE: ICD-10-CM

## 2023-04-25 DIAGNOSIS — I26.99 RECURRENT PULMONARY EMBOLI: ICD-10-CM

## 2023-04-25 DIAGNOSIS — I10 PRIMARY HYPERTENSION: Primary | ICD-10-CM

## 2023-04-25 PROCEDURE — 4010F ACE/ARB THERAPY RXD/TAKEN: CPT | Mod: HCNC,CPTII,95, | Performed by: STUDENT IN AN ORGANIZED HEALTH CARE EDUCATION/TRAINING PROGRAM

## 2023-04-25 PROCEDURE — 99214 PR OFFICE/OUTPT VISIT, EST, LEVL IV, 30-39 MIN: ICD-10-PCS | Mod: HCNC,95,, | Performed by: STUDENT IN AN ORGANIZED HEALTH CARE EDUCATION/TRAINING PROGRAM

## 2023-04-25 PROCEDURE — 99214 OFFICE O/P EST MOD 30 MIN: CPT | Mod: HCNC,95,, | Performed by: STUDENT IN AN ORGANIZED HEALTH CARE EDUCATION/TRAINING PROGRAM

## 2023-04-25 PROCEDURE — 1157F ADVNC CARE PLAN IN RCRD: CPT | Mod: HCNC,CPTII,95, | Performed by: STUDENT IN AN ORGANIZED HEALTH CARE EDUCATION/TRAINING PROGRAM

## 2023-04-25 PROCEDURE — 4010F PR ACE/ARB THEARPY RXD/TAKEN: ICD-10-PCS | Mod: HCNC,CPTII,95, | Performed by: STUDENT IN AN ORGANIZED HEALTH CARE EDUCATION/TRAINING PROGRAM

## 2023-04-25 PROCEDURE — 1157F PR ADVANCE CARE PLAN OR EQUIV PRESENT IN MEDICAL RECORD: ICD-10-PCS | Mod: HCNC,CPTII,95, | Performed by: STUDENT IN AN ORGANIZED HEALTH CARE EDUCATION/TRAINING PROGRAM

## 2023-04-25 NOTE — PROGRESS NOTES
Subjective:   Patient ID:  Susu Luther is a 73 y.o. female who presents for follow up of No chief complaint on file.      The patient location is: home  The chief complaint leading to consultation is: follow up    Visit type: audiovisual    Face to Face time with patient: 20 min  20 minutes of total time spent on the encounter, which includes face to face time and non-face to face time preparing to see the patient (eg, review of tests), Obtaining and/or reviewing separately obtained history, Documenting clinical information in the electronic or other health record, Independently interpreting results (not separately reported) and communicating results to the patient/family/caregiver, or Care coordination (not separately reported).         Each patient to whom he or she provides medical services by telemedicine is:  (1) informed of the relationship between the physician and patient and the respective role of any other health care provider with respect to management of the patient; and (2) notified that he or she may decline to receive medical services by telemedicine and may withdraw from such care at any time.    Notes:           12/1/20  71 yo female, care establish. Prior cardiologist Dr ackerman   [facility] HTN, CVA (2009), Right breast CA, Lumpectomy 3/2019, h/o PE off OAC 2 yrs ago.  AAA, s/p transcutaneous patch by Dr. Bonds, obesity knee OA imbalanced walker dependent  C/o SOB after walking few steps and dizziness. Had vision issue 1 month ago due to uncontrolled HTN  No chest pain  Sleeps with 2 pillows  Decent appetites  Leg calf pain worse at night  No smoking/drinking  ekg today NSR LVH.    ECH normal EF, grade II DD, LAE and PAP 56 mmHG   Chest CTA negative for PE  BP high    A1c controlled    S/p Open subpectoral lymph node biopsy on the right on 12/02/2020 by Dr. Starks  Still right chest, under arm and shoudler supersensitive pain      Shortness of Breath  Associated symptoms  include chest pain. Pertinent negatives include no abdominal pain, claudication, fever, headaches, neck pain, orthopnea, PND, rash, sputum production, syncope, vomiting or wheezing.   Chest Pain   Associated symptoms include shortness of breath. Pertinent negatives include no abdominal pain, back pain, claudication, cough, diaphoresis, dizziness, fever, headaches, irregular heartbeat, malaise/fatigue, nausea, near-syncope, numbness, orthopnea, palpitations, PND, sputum production, syncope, vomiting or weakness.   Her past medical history is significant for CHF.   Pertinent negatives for past medical history include no seizures.   Congestive Heart Failure  Associated symptoms include chest pain and shortness of breath. Pertinent negatives include no abdominal pain, claudication, near-syncope or palpitations.   2/28/22  Patient was admitted to [facility] for shortness of breath.  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  Started on heparin drip in transition to oral anticoagulation, now on Eliquis.  Recurrent PE.  On Femara. Has another lump in breast on right side, recently seen on PET scan.   Due to TANNER, was told to stop hctz, telmisartan.  She has ran out of clonidine. Does not take the ASA, hydralazine, amlodipine.   Blood pressure normotensive.  Reports severe headaches.  Has been out of Fioricet.  Echocardiogram with normal EF  Reports shortness of breath, chest heaviness mostly right-sided.      3/14/22  Still having SOB due to PE.  No bleeding issues while on eliquis.  Chest pain is substernal to right sided.   Lasix doesn't seem to help.   A reports intermittent leg pain right > left.  DVT ultrasound in-hospital with no evidence of DVT.  Following Hematology-Oncology.  Patient does not want surgery if needed for possible breast cancer.  Denies syncope, fever, chills.         4/18/22  Comes in with daughter who lives in [place].  Concerned that she may oxygen  issues and may need home oxygen. O2 98%  Reports LE swelling and SOB  Reports chest pain comes on with SOB, did not have chest pain prior to PE  Has not been on lasix, has been held  Reports balancing issues- can do PT once symptoms improve   Denies syncope, fever, chills.       5/16/22  Has been feeling weak last couple days  Feels chest tightness with walking, also CURIEL- feels worse than before. 97% O2 in clinic   Reports dizziness after taking medications   BP low today   Says recurrent cancer may be spreading   No bleeding on eliquis   Reports orthopnea, PND.  Not feeling well- Does not want to the hospital     Denies syncope.      6/13/22  Not feeling well today  Reports chest pain and SOB worsening over the last 2 weeks   Ddimer trending, downTroponin neg and BNP nl on 5/16/22  EKG today without significant abnormalities   Reports recent diagnosis of breast cancer is progressing  Reports right-sided arm weakness  Patient has been increasingly stressed    Denies syncope, lower extremity swelling.      7/6/2022   went to emergency room 6/13/2022, was worked up for stroke  MRI brain negative  Repeat CTA chest without PE, right-sided mass 6 cm, stable  All blood pressure medications.  Due to hypotension  Started taking Lasix today  Has significant lower extremity pain bilateral, reports blue feet at times      8/9/22  Virtual visit  Ambulates with walker   Had a fall recently due to syncopal episode, went to urgent care, negative workup per patient  Syncopal event occurred while standing up  Last visit Lasix was increased, patient reports increased thirst and water intake  Has also been taking carvedilol, reports low blood pressure  Chest pain worsened after syncopal event, has bruising on her body      9/7/22  Reports leg swelling, left  Has been taking eliquis also   Restarted taking lasix 2 weeks ago  Recently started on lyrica   Still wearing cardiac monitor  Still having SOB and chest discomfort  U/S arterial  w/o significant stenosis   BP elevated, high at home  Lost 8 lbs since 9/1      10/12/22  Virtual visit  Doing well, still getting chest pain   Still has shortness of breath but has improved   Was able to go to a football game without any issues   DVT ultrasound without thrombus   Has been treated for gout, improvement of toe pain  Has been having intermittent blood pressure elevated readings at home        2/20/23  Stress test normal   BP elevated now  Has been more tired  Drinking lots of water  Chest pain has improved with imdur  SOB stable   Has chronic pain and chronic fatigue         3/21/23  Patient was recently admitted to the hospital for right arm weakness, TANNER, chest pain, shortness of breath   MRI/CT scan of the brain were unremarkable   CTA did not show PE   Had elevated creatinine, renal ultrasound did not show arterial stenosis  BP noted to be significantly elevated   Blood pressure meds were changed, patient only taking Entresto and amlodipine   Coreg was stopped  Reports right-sided breast lump/mass currently being investigated    Today, reports still having shortness of breath  Waiting to have ultrasound done for right breast lump  Blood pressure stable today      4/25/23  Virtual visit   Continues to have intermittent chest pain, shortness of breath   Has not fallen since last visit   Blood pressure has been stable   Metanephrine levels are elevated  No bleeding on Eliquis      EKG 2/20/23 NSR, PVCs, LAD, minimal LVH  EKG 6/13/22 NSR, LAD, LVH, 93 bpm, qtc 455 ms  EKG 5/16/22 SB, incomplete RBBB, LVH, septal infarct, qtc 422 ms   Echo 2/28/22  The left ventricle is normal in size with concentric hypertrophy and normal systolic function.  The estimated ejection fraction is 60%.  Normal left ventricular diastolic function.  Normal right ventricular size with normal right ventricular systolic function.  Mild to moderate tricuspid regurgitation.  Normal central venous pressure (3 mmHg).  The estimated  PA systolic pressure is 36 mmHg.       Echo 2019  CONCLUSIONS     1 - Mild left atrial enlargement.     2 - Concentric hypertrophy.     3 - No wall motion abnormalities.     4 - Normal left ventricular systolic function (EF 60-65%).     5 - Impaired LV relaxation, elevated LAP (grade 2 diastolic dysfunction).     6 - Normal right ventricular systolic function .     7 - The estimated PA systolic pressure is 36 mmHg.     8 - Mild tricuspid regurgitation.        Past Medical History:   Diagnosis Date    Cataract     Bilateral    Chronic diastolic congestive heart failure 2020    Dizziness 2023    Encounter for blood transfusion     History of repair of aneurysm of abdominal aorta using endovascular stent graft     DR Bonds     of psychiatric care     Hypertension     Major depressive disorder, single episode, moderate with anxious distress 2020    Malignant neoplasm of upper-outer quadrant of right breast in female, estrogen receptor positive 2019    radiation    Psychiatric problem     Sleep apnea     Sleep difficulties     Stroke     no residual defect    Therapy        Past Surgical History:   Procedure Laterality Date    APPENDECTOMY      AXILLARY NODE DISSECTION Right 2020    Procedure: LYMPHADENECTOMY, AXILLARY;  Surgeon: Artemio Starks MD;  Location: [facility] OR;  Service: General;  Laterality: Right;    BIOPSY OF AXILLARY NODE Right 2021    Procedure: BIOPSY, LYMPH NODE, AXILLARY;  Surgeon: Artemio Sutton MD;  Location: [address];  Service: General;  Laterality: Right;    BREAST BIOPSY      2019    BREAST LUMPECTOMY      2019     SECTION      x 1    OOPHORECTOMY      right     SENTINEL LYMPH NODE BIOPSY Right 2019    Procedure: BIOPSY, LYMPH NODE, SENTINEL;  Surgeon: Artemio Starks MD;  Location: [facility] OR;  Service: General;  Laterality: Right;    splenic artery aneurysm repair      TONSILLECTOMY         Social History     Tobacco Use     Smoking status: Never    Smokeless tobacco: Never   Substance Use Topics    Alcohol use: No    Drug use: No       Family History   Problem Relation Age of Onset    Diabetes Maternal Grandmother     Diabetes Maternal Grandfather     Diabetes Mother     Hypertension Mother     Sickle cell anemia Daughter     Breast cancer Neg Hx     Colon cancer Neg Hx     Ovarian cancer Neg Hx          Review of Systems   Constitutional: Negative for decreased appetite, diaphoresis, fever, malaise/fatigue and night sweats.   HENT:  Negative for nosebleeds.    Eyes:  Negative for blurred vision and double vision.   Cardiovascular:  Positive for chest pain and dyspnea on exertion. Negative for claudication, irregular heartbeat, near-syncope, orthopnea, palpitations, paroxysmal nocturnal dyspnea and syncope.   Respiratory:  Positive for shortness of breath. Negative for cough, sleep disturbances due to breathing, snoring, sputum production and wheezing.    Endocrine: Negative for cold intolerance and polyuria.   Hematologic/Lymphatic: Does not bruise/bleed easily.   Skin:  Negative for rash.   Musculoskeletal:  Negative for back pain, falls, joint pain, joint swelling and neck pain.        Right chest breast and shoulder pain   Gastrointestinal:  Negative for abdominal pain, heartburn, nausea and vomiting.   Genitourinary:  Negative for dysuria, frequency and hematuria.   Neurological:  Negative for difficulty with concentration, dizziness, focal weakness, headaches, light-headedness, numbness, seizures and weakness.   Psychiatric/Behavioral:  Negative for depression, memory loss and substance abuse. The patient does not have insomnia.    Allergic/Immunologic: Negative for HIV exposure and hives.   There were no vitals filed for this visit.          Objective:   Physical Exam  Constitutional:       Comments: Mild distress.    HENT:      Head: Normocephalic.   Eyes:      Pupils: Pupils are equal, round, and reactive to light.   Neck:       Thyroid: No thyromegaly.      Vascular: Normal carotid pulses. No carotid bruit or JVD.   Cardiovascular:      Rate and Rhythm: Normal rate and regular rhythm. No extrasystoles are present.     Chest Wall: PMI is not displaced.      Pulses: Normal pulses.      Heart sounds: Normal heart sounds. No murmur heard.    No gallop. No S3 sounds.   Pulmonary:      Effort: No respiratory distress.      Breath sounds: Normal breath sounds. No stridor.   Abdominal:      General: Bowel sounds are normal.      Palpations: Abdomen is soft.      Tenderness: There is no abdominal tenderness. There is no rebound.   Musculoskeletal:         General: Normal range of motion.      Comments: Tender to palpitation   Skin:     Findings: No rash.   Neurological:      Mental Status: She is alert and oriented to person, place, and time.   Psychiatric:         Behavior: Behavior normal.       Lab Results   Component Value Date    CHOL 234 (H) 02/28/2023    CHOL 229 (H) 04/18/2022    CHOL 221 (H) 10/19/2020     Lab Results   Component Value Date    HDL 44 02/28/2023    HDL 45 04/18/2022    HDL 55 10/19/2020     Lab Results   Component Value Date    LDLCALC 151.8 02/28/2023    LDLCALC 150.4 04/18/2022    LDLCALC 137.4 10/19/2020     Lab Results   Component Value Date    TRIG 191 (H) 02/28/2023    TRIG 168 (H) 04/18/2022    TRIG 143 10/19/2020     Lab Results   Component Value Date    CHOLHDL 18.8 (L) 02/28/2023    CHOLHDL 19.7 (L) 04/18/2022    CHOLHDL 24.9 10/19/2020       Chemistry        Component Value Date/Time     03/20/2023 1150    K 4.4 03/20/2023 1150     03/20/2023 1150    CO2 30 (H) 03/20/2023 1150    BUN 16 03/20/2023 1150    CREATININE 1.2 03/20/2023 1150    GLU 91 03/20/2023 1150        Component Value Date/Time    CALCIUM 9.4 03/20/2023 1150    ALKPHOS 108 03/11/2023 0422    ALKPHOS 108 03/11/2023 0422    AST 12 03/11/2023 0422    AST 12 03/11/2023 0422    ALT 12 03/11/2023 0422    ALT 12 03/11/2023 0422    BILITOT  0.2 03/11/2023 0422    BILITOT 0.2 03/11/2023 0422    ESTGFRAFRICA 37 (A) 07/06/2022 1312    EGFRNONAA 32 (A) 07/06/2022 1312          Lab Results   Component Value Date    HGBA1C 5.9 (H) 05/18/2019     Lab Results   Component Value Date    TSH 1.691 03/10/2023     Lab Results   Component Value Date    INR 1.1 03/11/2023    INR 0.9 02/15/2022    INR 1.0 11/10/2020     Lab Results   Component Value Date    WBC 8.65 03/20/2023    HGB 11.8 (L) 03/20/2023    HCT 38.0 03/20/2023    MCV 82 03/20/2023     03/20/2023     BMP  Sodium   Date Value Ref Range Status   03/20/2023 141 136 - 145 mmol/L Final     Potassium   Date Value Ref Range Status   03/20/2023 4.4 3.5 - 5.1 mmol/L Final     Chloride   Date Value Ref Range Status   03/20/2023 105 95 - 110 mmol/L Final     CO2   Date Value Ref Range Status   03/20/2023 30 (H) 23 - 29 mmol/L Final     BUN   Date Value Ref Range Status   03/20/2023 16 8 - 23 mg/dL Final     Creatinine   Date Value Ref Range Status   03/20/2023 1.2 0.5 - 1.4 mg/dL Final     Calcium   Date Value Ref Range Status   03/20/2023 9.4 8.7 - 10.5 mg/dL Final     Anion Gap   Date Value Ref Range Status   03/20/2023 6 (L) 8 - 16 mmol/L Final     eGFR if    Date Value Ref Range Status   07/06/2022 37 (A) >60 mL/min/1.73 m^2 Final     eGFR if non    Date Value Ref Range Status   07/06/2022 32 (A) >60 mL/min/1.73 m^2 Final     Comment:     Calculation used to obtain the estimated glomerular filtration  rate (eGFR) is the CKD-EPI equation.        BNP  @LABRCNTIP(BNP,BNPTRIAGEBLO)@  @LABRCNTIP(troponini)@  CrCl cannot be calculated (Patient's most recent lab result is older than the maximum 7 days allowed.).  No results found in the last 24 hours.  No results found in the last 24 hours.  No results found in the last 24 hours.    Assessment:      1. Primary hypertension    2. SOB (shortness of breath)    3. Chronic diastolic congestive heart failure    4. Morbid obesity with  BMI of 40.0-44.9, adult    5. Edema, unspecified type    6. History of CVA (cerebrovascular accident)    7. Hyperlipidemia, unspecified hyperlipidemia type    8. Recurrent pulmonary emboli    9. Abdominal aortic aneurysm (AAA) without rupture, unspecified part    10. Syncope and collapse    11. Chest pain, unspecified type    12. Malignant neoplasm of upper-outer quadrant of right breast in female, estrogen receptor positive            Plan:   Hypertension  Stable but significant fluctuations  Carvedilol, hydralazine stopped  Continue Entresto, amlodipine   Elevated metanephrines- refer to endocrine     Left leg swelling/edema  Continue lasix 20 mg b.i.d.- take additional dose for swelling   Venous ultrasound 10/22 without DVT    Chest pain/shortness of breath-stable  CTA 3/23 did not show PE   Stress test 1/23 normal stress test     Syncope- resolved   Possibly due to dehydration  14 day monitor- 7.27% PACs, essentially asymptomatic    History of right breast cancer  Follows with Hematology-Oncology    Diastolic heart failure  Echo 3/23 with normal EF, mild-mod TR    Right leg pain-stable  DVT ultrasound 10/22 without evidence of DVT   Normal ABIs 3/22  Arterial ultrasound 8/22 without significant stenosis    Recurrent pulmonary embolus   Recurrent PE as of 2/22  Continue OAC    History of CVA  Carotid ultrasound no significant stenosis, recent MRI brain no abnormality  Currently not on aspirin as she is taking Eliquis  Continue statin    HLD   as of 2/23  Continue statin    AAA s/p transcutaneous patch  Stable    Morbid obesity, BMI > 40  Low-salt, low-fat diet  Exercise as tolerated      All labs and cardiac procedures reviewed.      Return to clinic 3 months     Arlene Hobbs MD  Cardiology Staff

## 2023-04-28 ENCOUNTER — OFFICE VISIT (OUTPATIENT)
Dept: INTERNAL MEDICINE | Facility: CLINIC | Age: 73
End: 2023-04-28
Payer: MEDICARE

## 2023-04-28 ENCOUNTER — OFFICE VISIT (OUTPATIENT)
Dept: OPHTHALMOLOGY | Facility: CLINIC | Age: 73
End: 2023-04-28
Payer: MEDICARE

## 2023-04-28 ENCOUNTER — PATIENT MESSAGE (OUTPATIENT)
Dept: INTERNAL MEDICINE | Facility: CLINIC | Age: 73
End: 2023-04-28

## 2023-04-28 VITALS
HEART RATE: 87 BPM | SYSTOLIC BLOOD PRESSURE: 130 MMHG | RESPIRATION RATE: 18 BRPM | BODY MASS INDEX: 43.4 KG/M2 | OXYGEN SATURATION: 97 % | HEIGHT: 69 IN | DIASTOLIC BLOOD PRESSURE: 82 MMHG | TEMPERATURE: 98 F | WEIGHT: 293 LBS

## 2023-04-28 DIAGNOSIS — I26.99 ACUTE PULMONARY EMBOLISM, UNSPECIFIED PULMONARY EMBOLISM TYPE, UNSPECIFIED WHETHER ACUTE COR PULMONALE PRESENT: ICD-10-CM

## 2023-04-28 DIAGNOSIS — F54 PSYCHOLOGICAL FACTORS AFFECTING MEDICAL CONDITION: ICD-10-CM

## 2023-04-28 DIAGNOSIS — N63.11 MASS OF UPPER OUTER QUADRANT OF RIGHT BREAST: Primary | ICD-10-CM

## 2023-04-28 DIAGNOSIS — M79.605 BILATERAL LEG PAIN: ICD-10-CM

## 2023-04-28 DIAGNOSIS — F32.1 MAJOR DEPRESSIVE DISORDER, SINGLE EPISODE, MODERATE WITH ANXIOUS DISTRESS: ICD-10-CM

## 2023-04-28 DIAGNOSIS — H53.2 MONOCULAR DIPLOPIA OF LEFT EYE: ICD-10-CM

## 2023-04-28 DIAGNOSIS — H04.123 DRY EYES, BILATERAL: ICD-10-CM

## 2023-04-28 DIAGNOSIS — H52.7 REFRACTIVE ERRORS: ICD-10-CM

## 2023-04-28 DIAGNOSIS — Z96.1 PSEUDOPHAKIA OF BOTH EYES: ICD-10-CM

## 2023-04-28 DIAGNOSIS — Z17.0 MALIGNANT NEOPLASM OF UPPER-OUTER QUADRANT OF RIGHT BREAST IN FEMALE, ESTROGEN RECEPTOR POSITIVE: ICD-10-CM

## 2023-04-28 DIAGNOSIS — C50.411 MALIGNANT NEOPLASM OF UPPER-OUTER QUADRANT OF RIGHT BREAST IN FEMALE, ESTROGEN RECEPTOR POSITIVE: ICD-10-CM

## 2023-04-28 DIAGNOSIS — H43.12 VITREOUS HEMORRHAGE, LEFT EYE: Primary | ICD-10-CM

## 2023-04-28 DIAGNOSIS — M79.604 BILATERAL LEG PAIN: ICD-10-CM

## 2023-04-28 DIAGNOSIS — G89.3 NEOPLASM RELATED PAIN: ICD-10-CM

## 2023-04-28 PROCEDURE — 99999 PR PBB SHADOW E&M-EST. PATIENT-LVL III: CPT | Mod: PBBFAC,HCNC,, | Performed by: OPTOMETRIST

## 2023-04-28 PROCEDURE — 4010F PR ACE/ARB THEARPY RXD/TAKEN: ICD-10-PCS | Mod: HCNC,CPTII,S$GLB, | Performed by: OPTOMETRIST

## 2023-04-28 PROCEDURE — 99999 PR PBB SHADOW E&M-EST. PATIENT-LVL III: ICD-10-PCS | Mod: PBBFAC,HCNC,, | Performed by: OPTOMETRIST

## 2023-04-28 PROCEDURE — 1157F ADVNC CARE PLAN IN RCRD: CPT | Mod: HCNC,CPTII,S$GLB, | Performed by: FAMILY MEDICINE

## 2023-04-28 PROCEDURE — 99999 PR PBB SHADOW E&M-EST. PATIENT-LVL V: CPT | Mod: PBBFAC,HCNC,, | Performed by: FAMILY MEDICINE

## 2023-04-28 PROCEDURE — 3008F PR BODY MASS INDEX (BMI) DOCUMENTED: ICD-10-PCS | Mod: HCNC,CPTII,S$GLB, | Performed by: FAMILY MEDICINE

## 2023-04-28 PROCEDURE — 3288F FALL RISK ASSESSMENT DOCD: CPT | Mod: HCNC,CPTII,S$GLB, | Performed by: FAMILY MEDICINE

## 2023-04-28 PROCEDURE — 99214 PR OFFICE/OUTPT VISIT, EST, LEVL IV, 30-39 MIN: ICD-10-PCS | Mod: HCNC,S$GLB,, | Performed by: FAMILY MEDICINE

## 2023-04-28 PROCEDURE — 4010F PR ACE/ARB THEARPY RXD/TAKEN: ICD-10-PCS | Mod: HCNC,CPTII,S$GLB, | Performed by: FAMILY MEDICINE

## 2023-04-28 PROCEDURE — 3075F SYST BP GE 130 - 139MM HG: CPT | Mod: HCNC,CPTII,S$GLB, | Performed by: FAMILY MEDICINE

## 2023-04-28 PROCEDURE — 3288F PR FALLS RISK ASSESSMENT DOCUMENTED: ICD-10-PCS | Mod: HCNC,CPTII,S$GLB, | Performed by: FAMILY MEDICINE

## 2023-04-28 PROCEDURE — 1157F PR ADVANCE CARE PLAN OR EQUIV PRESENT IN MEDICAL RECORD: ICD-10-PCS | Mod: HCNC,CPTII,S$GLB, | Performed by: FAMILY MEDICINE

## 2023-04-28 PROCEDURE — 3079F DIAST BP 80-89 MM HG: CPT | Mod: HCNC,CPTII,S$GLB, | Performed by: FAMILY MEDICINE

## 2023-04-28 PROCEDURE — 92012 INTRM OPH EXAM EST PATIENT: CPT | Mod: HCNC,S$GLB,, | Performed by: OPTOMETRIST

## 2023-04-28 PROCEDURE — 1126F PR PAIN SEVERITY QUANTIFIED, NO PAIN PRESENT: ICD-10-PCS | Mod: HCNC,CPTII,S$GLB, | Performed by: FAMILY MEDICINE

## 2023-04-28 PROCEDURE — 1101F PR PT FALLS ASSESS DOC 0-1 FALLS W/OUT INJ PAST YR: ICD-10-PCS | Mod: HCNC,CPTII,S$GLB, | Performed by: FAMILY MEDICINE

## 2023-04-28 PROCEDURE — 3079F PR MOST RECENT DIASTOLIC BLOOD PRESSURE 80-89 MM HG: ICD-10-PCS | Mod: HCNC,CPTII,S$GLB, | Performed by: FAMILY MEDICINE

## 2023-04-28 PROCEDURE — 1157F ADVNC CARE PLAN IN RCRD: CPT | Mod: HCNC,CPTII,S$GLB, | Performed by: OPTOMETRIST

## 2023-04-28 PROCEDURE — 4010F ACE/ARB THERAPY RXD/TAKEN: CPT | Mod: HCNC,CPTII,S$GLB, | Performed by: OPTOMETRIST

## 2023-04-28 PROCEDURE — 3075F PR MOST RECENT SYSTOLIC BLOOD PRESS GE 130-139MM HG: ICD-10-PCS | Mod: HCNC,CPTII,S$GLB, | Performed by: FAMILY MEDICINE

## 2023-04-28 PROCEDURE — 99214 OFFICE O/P EST MOD 30 MIN: CPT | Mod: HCNC,S$GLB,, | Performed by: FAMILY MEDICINE

## 2023-04-28 PROCEDURE — 1157F PR ADVANCE CARE PLAN OR EQUIV PRESENT IN MEDICAL RECORD: ICD-10-PCS | Mod: HCNC,CPTII,S$GLB, | Performed by: OPTOMETRIST

## 2023-04-28 PROCEDURE — 99999 PR PBB SHADOW E&M-EST. PATIENT-LVL V: ICD-10-PCS | Mod: PBBFAC,HCNC,, | Performed by: FAMILY MEDICINE

## 2023-04-28 PROCEDURE — 1126F AMNT PAIN NOTED NONE PRSNT: CPT | Mod: HCNC,CPTII,S$GLB, | Performed by: FAMILY MEDICINE

## 2023-04-28 PROCEDURE — 4010F ACE/ARB THERAPY RXD/TAKEN: CPT | Mod: HCNC,CPTII,S$GLB, | Performed by: FAMILY MEDICINE

## 2023-04-28 PROCEDURE — 1159F PR MEDICATION LIST DOCUMENTED IN MEDICAL RECORD: ICD-10-PCS | Mod: HCNC,CPTII,S$GLB, | Performed by: OPTOMETRIST

## 2023-04-28 PROCEDURE — 92012 PR EYE EXAM, EST PATIENT,INTERMED: ICD-10-PCS | Mod: HCNC,S$GLB,, | Performed by: OPTOMETRIST

## 2023-04-28 PROCEDURE — 1159F MED LIST DOCD IN RCRD: CPT | Mod: HCNC,CPTII,S$GLB, | Performed by: OPTOMETRIST

## 2023-04-28 PROCEDURE — 1101F PT FALLS ASSESS-DOCD LE1/YR: CPT | Mod: HCNC,CPTII,S$GLB, | Performed by: FAMILY MEDICINE

## 2023-04-28 PROCEDURE — 3008F BODY MASS INDEX DOCD: CPT | Mod: HCNC,CPTII,S$GLB, | Performed by: FAMILY MEDICINE

## 2023-04-28 RX ORDER — PREGABALIN 100 MG/1
100 CAPSULE ORAL 2 TIMES DAILY
Qty: 60 CAPSULE | Refills: 6 | Status: SHIPPED | OUTPATIENT
Start: 2023-04-28 | End: 2023-05-17 | Stop reason: SDUPTHER

## 2023-04-28 RX ORDER — DICLOFENAC SODIUM 10 MG/G
2 GEL TOPICAL DAILY
Qty: 400 G | Refills: 1 | Status: SHIPPED | OUTPATIENT
Start: 2023-04-28 | End: 2023-05-17 | Stop reason: SDUPTHER

## 2023-04-28 RX ORDER — BUTALBITAL, ACETAMINOPHEN AND CAFFEINE 50; 325; 40 MG/1; MG/1; MG/1
TABLET ORAL
Qty: 30 TABLET | Refills: 1 | Status: SHIPPED | OUTPATIENT
Start: 2023-04-28 | End: 2023-05-16 | Stop reason: SDUPTHER

## 2023-04-28 RX ORDER — ALPRAZOLAM 0.5 MG/1
0.5 TABLET ORAL 2 TIMES DAILY PRN
Qty: 180 TABLET | Refills: 0 | Status: SHIPPED | OUTPATIENT
Start: 2023-04-28 | End: 2023-05-08 | Stop reason: SDUPTHER

## 2023-04-28 NOTE — PROGRESS NOTES
HPI    Patient having double vision since March at near and distance.  Having pain in both eyes left eye worse than right eye  Last eye visit 03/17/2023 TRF.  Update glasses RX.  Hx of Deep Keratitis OU.  Hx Amaurosis fugax OU  Last edited by Raul Boyd, OD on 4/28/2023  3:37 PM.            Assessment /Plan     For exam results, see Encounter Report.    Vitreous hemorrhage, left eye    Pseudophakia of both eyes    Monocular diplopia of left eye    Dry eyes, bilateral    Refractive errors      Nasal Vit heme OS, post pole visible  Significant keratitis OU    Consult Dr Ba vit heme eval OS

## 2023-04-29 NOTE — PROGRESS NOTES
Subjective:       Patient ID: Susu Luther is a 73 y.o. female.    Chief Complaint: Follow-up    Follow-up  Associated symptoms include chest pain, headaches and neck pain.   Hypertension  This is a recurrent problem. The current episode started more than 1 year ago. The problem has been rapidly worsening since onset. The problem is resistant. Associated symptoms include anxiety, blurred vision, chest pain, headaches, malaise/fatigue, neck pain, orthopnea, peripheral edema, PND, shortness of breath and sweats. Pertinent negatives include no palpitations. Agents associated with hypertension include decongestants. Risk factors for coronary artery disease include family history, sedentary lifestyle and stress. Past treatments include lifestyle changes. The current treatment provides mild improvement. Compliance problems include exercise.      Has not seen hematology/oncology since her provider left   She still has the mass in right breast that has not been addressed she missed ultrasound and mammogram because she had to go out of town    Review of Systems   Constitutional:  Positive for malaise/fatigue.   Eyes:  Positive for blurred vision.   Respiratory:  Positive for shortness of breath.    Cardiovascular:  Positive for chest pain, orthopnea and PND. Negative for palpitations.   Musculoskeletal:  Positive for neck pain.   Neurological:  Positive for headaches.         Past Medical History:   Diagnosis Date    Cataract     Bilateral    Chronic diastolic congestive heart failure 08/25/2020    Dizziness 03/12/2023    Encounter for blood transfusion     History of repair of aneurysm of abdominal aorta using endovascular stent graft     DR Bonds    Hx of psychiatric care     Hypertension     Major depressive disorder, single episode, moderate with anxious distress 01/14/2020    Malignant neoplasm of upper-outer quadrant of right breast in female, estrogen receptor positive 03/14/2019    radiation    Psychiatric  problem     Sleep apnea     Sleep difficulties     Stroke 2009    no residual defect    Therapy      Past Surgical History:   Procedure Laterality Date    APPENDECTOMY      AXILLARY NODE DISSECTION Right 2020    Procedure: LYMPHADENECTOMY, AXILLARY;  Surgeon: Artemio Starks MD;  Location: [facility];  Service: General;  Laterality: Right;    BIOPSY OF AXILLARY NODE Right 2021    Procedure: BIOPSY, LYMPH NODE, AXILLARY;  Surgeon: Artemio Sutton MD;  Location: [address];  Service: General;  Laterality: Right;    BREAST BIOPSY          BREAST LUMPECTOMY      2019     SECTION      x 1    OOPHORECTOMY      right     SENTINEL LYMPH NODE BIOPSY Right 2019    Procedure: BIOPSY, LYMPH NODE, SENTINEL;  Surgeon: Artemio Starks MD;  Location: [facility];  Service: General;  Laterality: Right;    splenic artery aneurysm repair      TONSILLECTOMY       Family History   Problem Relation Age of Onset    Diabetes Maternal Grandmother     Diabetes Maternal Grandfather     Diabetes Mother     Hypertension Mother     Sickle cell anemia Daughter     Breast cancer Neg Hx     Colon cancer Neg Hx     Ovarian cancer Neg Hx      Social History     Socioeconomic History    Marital status:      Spouse name: Campos Luther    Number of children: 2   Tobacco Use    Smoking status: Never    Smokeless tobacco: Never   Substance and Sexual Activity    Alcohol use: No    Drug use: No    Sexual activity: Yes     Partners: Male     Birth control/protection: Post-menopausal     Social Determinants of Health     Financial Resource Strain: Medium Risk    Difficulty of Paying Living Expenses: Somewhat hard   Food Insecurity: No Food Insecurity    Worried About Running Out of Food in the Last Year: Never true    Ran Out of Food in the Last Year: Never true   Transportation Needs: No Transportation Needs    Lack of Transportation (Medical): No    Lack of Transportation (Non-Medical): No   Physical Activity:  Inactive    Days of Exercise per Week: 0 days    Minutes of Exercise per Session: 0 min   Stress: Stress Concern Present    Feeling of Stress : Very much   Social Connections: Socially Integrated    Frequency of Communication with Friends and Family: More than three times a week    Frequency of Social Gatherings with Friends and Family: Never    Attends [employer] Services: More than 4 times per year    Active Member of Clubs or Organizations: Yes    Attends Club or Organization Meetings: 1 to 4 times per year    Marital Status:    Housing Stability: Low Risk     Unable to Pay for Housing in the Last Year: No    Number of Places Lived in the Last Year: 1    Unstable Housing in the Last Year: No       Objective:        Physical Exam  Vitals reviewed.   Constitutional:       Appearance: Normal appearance.   HENT:      Head: Normocephalic and atraumatic.   Chest:       Musculoskeletal:      Cervical back: Normal range of motion.   Neurological:      Mental Status: She is alert.         Results for orders placed or performed in visit on 03/22/23   CORTISOL, 8AM   Result Value Ref Range    Cortisol, 8 AM 9.10 4.30 - 22.40 ug/dL   Aldosterone/Renin Activity Ratio   Result Value Ref Range    Aldosterone 15.0 ng/dL    Renin 26.1 ng/mL/hr    Aldosterone/Renin Activity Calculation 0.6 <=25.0 ratio   CATECHOLAMINES, FRACTIONATED, PLASMA   Result Value Ref Range    Dopamine <40 pg/mL    Epinephrine <80 pg/mL    Norepinephrine 2736 (H) pg/mL    Catecholamine, Plasma 2736 (H) pg/mL   METANEPHRINES, PLASMA FREE   Result Value Ref Range    Metanephrine, Free 73 (H) < OR = 57 pg/mL    Metanephrine, Total, Plasma 788 (H) < OR = 205 pg/mL    Normetanephrine, Free 715 (H) < OR = 148 pg/mL   BNP   Result Value Ref Range    BNP 76 0 - 99 pg/mL     *Note: Due to a large number of results and/or encounters for the requested time period, some results have not been displayed. A complete set of results can be found in Results Review.        Assessment/Plan:     Mass of upper outer quadrant of right breast  -     US Breast Right Limited; Future; Expected date: 04/28/2023  -     Mammo Digital Diagnostic Right; Future; Expected date: 04/28/2023    Malignant neoplasm of upper-outer quadrant of right breast in female, estrogen receptor positive  -     pregabalin (LYRICA) 100 MG capsule; Take 1 capsule (100 mg total) by mouth 2 (two) times daily.  Dispense: 60 capsule; Refill: 6    Neoplasm related pain  -     pregabalin (LYRICA) 100 MG capsule; Take 1 capsule (100 mg total) by mouth 2 (two) times daily.  Dispense: 60 capsule; Refill: 6    Major depressive disorder, single episode, moderate with anxious distress  -     ALPRAZolam (XANAX) 0.5 MG tablet; Take 1 tablet (0.5 mg total) by mouth 2 (two) times daily as needed for Anxiety.  Dispense: 180 tablet; Refill: 0    Psychological factors affecting medical condition  -     ALPRAZolam (XANAX) 0.5 MG tablet; Take 1 tablet (0.5 mg total) by mouth 2 (two) times daily as needed for Anxiety.  Dispense: 180 tablet; Refill: 0    Acute pulmonary embolism, unspecified pulmonary embolism type, unspecified whether acute cor pulmonale present  -     apixaban (ELIQUIS) 5 mg Tab; Take 1 tablet (5 mg total) by mouth 2 (two) times daily.  Dispense: 180 tablet; Refill: 3    Bilateral leg pain  -     diclofenac sodium (VOLTAREN) 1 % Gel; Apply 2 grams topically once daily.  Dispense: 400 g; Refill: 1    Other orders  -     sacubitriL-valsartan (ENTRESTO) 49-51 mg per tablet; Take 1 tablet by mouth 2 (two) times daily.  Dispense: 60 tablet; Refill: 6  -     butalbital-acetaminophen-caffeine -40 mg (FIORICET, ESGIC) -40 mg per tablet; TAKE 1 TABLET DAILY AS NEEDED FOR PAIN OR HEADACHES.  Dispense: 30 tablet; Refill: 1          Follow up as needed    Peace Arias MD  [facility] Medicine

## 2023-05-01 ENCOUNTER — PATIENT MESSAGE (OUTPATIENT)
Dept: INTERNAL MEDICINE | Facility: CLINIC | Age: 73
End: 2023-05-01
Payer: MEDICARE

## 2023-05-01 RX ORDER — VENLAFAXINE HYDROCHLORIDE 75 MG/1
75 CAPSULE, EXTENDED RELEASE ORAL DAILY
Qty: 90 CAPSULE | Refills: 3 | Status: SHIPPED | OUTPATIENT
Start: 2023-05-01 | End: 2023-05-08

## 2023-05-03 ENCOUNTER — DOCUMENT SCAN (OUTPATIENT)
Dept: HOME HEALTH SERVICES | Facility: HOSPITAL | Age: 73
End: 2023-05-03
Payer: MEDICARE

## 2023-05-08 ENCOUNTER — TELEPHONE (OUTPATIENT)
Dept: INTERNAL MEDICINE | Facility: CLINIC | Age: 73
End: 2023-05-08
Payer: MEDICARE

## 2023-05-08 ENCOUNTER — OFFICE VISIT (OUTPATIENT)
Dept: INTERNAL MEDICINE | Facility: CLINIC | Age: 73
End: 2023-05-08
Payer: MEDICARE

## 2023-05-08 ENCOUNTER — DOCUMENT SCAN (OUTPATIENT)
Dept: HOME HEALTH SERVICES | Facility: HOSPITAL | Age: 73
End: 2023-05-08
Payer: MEDICARE

## 2023-05-08 VITALS
OXYGEN SATURATION: 99 % | WEIGHT: 293 LBS | HEART RATE: 62 BPM | BODY MASS INDEX: 45.22 KG/M2 | SYSTOLIC BLOOD PRESSURE: 162 MMHG | DIASTOLIC BLOOD PRESSURE: 98 MMHG | TEMPERATURE: 97 F | RESPIRATION RATE: 20 BRPM

## 2023-05-08 DIAGNOSIS — G89.3 NEOPLASM RELATED PAIN: ICD-10-CM

## 2023-05-08 DIAGNOSIS — R61 HYPERHIDROSIS: Primary | ICD-10-CM

## 2023-05-08 DIAGNOSIS — F54 PSYCHOLOGICAL FACTORS AFFECTING MEDICAL CONDITION: ICD-10-CM

## 2023-05-08 DIAGNOSIS — F32.1 MAJOR DEPRESSIVE DISORDER, SINGLE EPISODE, MODERATE WITH ANXIOUS DISTRESS: ICD-10-CM

## 2023-05-08 DIAGNOSIS — R51.9 NONINTRACTABLE HEADACHE, UNSPECIFIED CHRONICITY PATTERN, UNSPECIFIED HEADACHE TYPE: ICD-10-CM

## 2023-05-08 DIAGNOSIS — I10 ESSENTIAL HYPERTENSION: ICD-10-CM

## 2023-05-08 PROBLEM — R33.9 URINARY RETENTION: Status: RESOLVED | Noted: 2023-03-13 | Resolved: 2023-05-08

## 2023-05-08 PROCEDURE — 99999 PR PBB SHADOW E&M-EST. PATIENT-LVL IV: ICD-10-PCS | Mod: PBBFAC,HCNC,, | Performed by: FAMILY MEDICINE

## 2023-05-08 PROCEDURE — 1125F PR PAIN SEVERITY QUANTIFIED, PAIN PRESENT: ICD-10-PCS | Mod: HCNC,CPTII,S$GLB, | Performed by: FAMILY MEDICINE

## 2023-05-08 PROCEDURE — 3077F SYST BP >= 140 MM HG: CPT | Mod: HCNC,CPTII,S$GLB, | Performed by: FAMILY MEDICINE

## 2023-05-08 PROCEDURE — 1159F PR MEDICATION LIST DOCUMENTED IN MEDICAL RECORD: ICD-10-PCS | Mod: HCNC,CPTII,S$GLB, | Performed by: FAMILY MEDICINE

## 2023-05-08 PROCEDURE — 3288F FALL RISK ASSESSMENT DOCD: CPT | Mod: HCNC,CPTII,S$GLB, | Performed by: FAMILY MEDICINE

## 2023-05-08 PROCEDURE — 3080F PR MOST RECENT DIASTOLIC BLOOD PRESSURE >= 90 MM HG: ICD-10-PCS | Mod: HCNC,CPTII,S$GLB, | Performed by: FAMILY MEDICINE

## 2023-05-08 PROCEDURE — 4010F ACE/ARB THERAPY RXD/TAKEN: CPT | Mod: HCNC,CPTII,S$GLB, | Performed by: FAMILY MEDICINE

## 2023-05-08 PROCEDURE — 3288F PR FALLS RISK ASSESSMENT DOCUMENTED: ICD-10-PCS | Mod: HCNC,CPTII,S$GLB, | Performed by: FAMILY MEDICINE

## 2023-05-08 PROCEDURE — 1100F PTFALLS ASSESS-DOCD GE2>/YR: CPT | Mod: HCNC,CPTII,S$GLB, | Performed by: FAMILY MEDICINE

## 2023-05-08 PROCEDURE — 3080F DIAST BP >= 90 MM HG: CPT | Mod: HCNC,CPTII,S$GLB, | Performed by: FAMILY MEDICINE

## 2023-05-08 PROCEDURE — 99214 PR OFFICE/OUTPT VISIT, EST, LEVL IV, 30-39 MIN: ICD-10-PCS | Mod: HCNC,25,S$GLB, | Performed by: FAMILY MEDICINE

## 2023-05-08 PROCEDURE — 3008F BODY MASS INDEX DOCD: CPT | Mod: HCNC,CPTII,S$GLB, | Performed by: FAMILY MEDICINE

## 2023-05-08 PROCEDURE — 96372 PR INJECTION,THERAP/PROPH/DIAG2ST, IM OR SUBCUT: ICD-10-PCS | Mod: HCNC,S$GLB,, | Performed by: FAMILY MEDICINE

## 2023-05-08 PROCEDURE — 3077F PR MOST RECENT SYSTOLIC BLOOD PRESSURE >= 140 MM HG: ICD-10-PCS | Mod: HCNC,CPTII,S$GLB, | Performed by: FAMILY MEDICINE

## 2023-05-08 PROCEDURE — 1100F PR PT FALLS ASSESS DOC 2+ FALLS/FALL W/INJURY/YR: ICD-10-PCS | Mod: HCNC,CPTII,S$GLB, | Performed by: FAMILY MEDICINE

## 2023-05-08 PROCEDURE — 4010F PR ACE/ARB THEARPY RXD/TAKEN: ICD-10-PCS | Mod: HCNC,CPTII,S$GLB, | Performed by: FAMILY MEDICINE

## 2023-05-08 PROCEDURE — 1159F MED LIST DOCD IN RCRD: CPT | Mod: HCNC,CPTII,S$GLB, | Performed by: FAMILY MEDICINE

## 2023-05-08 PROCEDURE — 99499 UNLISTED E&M SERVICE: CPT | Mod: S$GLB,,, | Performed by: FAMILY MEDICINE

## 2023-05-08 PROCEDURE — 99214 OFFICE O/P EST MOD 30 MIN: CPT | Mod: HCNC,25,S$GLB, | Performed by: FAMILY MEDICINE

## 2023-05-08 PROCEDURE — 1157F ADVNC CARE PLAN IN RCRD: CPT | Mod: HCNC,CPTII,S$GLB, | Performed by: FAMILY MEDICINE

## 2023-05-08 PROCEDURE — 1157F PR ADVANCE CARE PLAN OR EQUIV PRESENT IN MEDICAL RECORD: ICD-10-PCS | Mod: HCNC,CPTII,S$GLB, | Performed by: FAMILY MEDICINE

## 2023-05-08 PROCEDURE — 96372 THER/PROPH/DIAG INJ SC/IM: CPT | Mod: HCNC,S$GLB,, | Performed by: FAMILY MEDICINE

## 2023-05-08 PROCEDURE — 99999 PR PBB SHADOW E&M-EST. PATIENT-LVL IV: CPT | Mod: PBBFAC,HCNC,, | Performed by: FAMILY MEDICINE

## 2023-05-08 PROCEDURE — 1125F AMNT PAIN NOTED PAIN PRSNT: CPT | Mod: HCNC,CPTII,S$GLB, | Performed by: FAMILY MEDICINE

## 2023-05-08 PROCEDURE — 3008F PR BODY MASS INDEX (BMI) DOCUMENTED: ICD-10-PCS | Mod: HCNC,CPTII,S$GLB, | Performed by: FAMILY MEDICINE

## 2023-05-08 RX ORDER — AMLODIPINE BESYLATE 10 MG/1
10 TABLET ORAL DAILY
Qty: 90 TABLET | Refills: 3 | Status: SHIPPED | OUTPATIENT
Start: 2023-05-08 | End: 2023-05-17 | Stop reason: SDUPTHER

## 2023-05-08 RX ORDER — DULOXETIN HYDROCHLORIDE 30 MG/1
30 CAPSULE, DELAYED RELEASE ORAL DAILY
Qty: 90 CAPSULE | Refills: 3 | Status: ON HOLD | OUTPATIENT
Start: 2023-05-08 | End: 2023-07-14 | Stop reason: HOSPADM

## 2023-05-08 RX ORDER — OXYBUTYNIN CHLORIDE 5 MG/1
5 TABLET ORAL DAILY
Qty: 90 TABLET | Refills: 3 | Status: SHIPPED | OUTPATIENT
Start: 2023-05-08 | End: 2023-05-17 | Stop reason: SDUPTHER

## 2023-05-08 RX ORDER — CLONIDINE HYDROCHLORIDE 0.1 MG/1
0.1 TABLET ORAL 2 TIMES DAILY PRN
Qty: 60 TABLET | Refills: 1 | Status: SHIPPED | OUTPATIENT
Start: 2023-05-08 | End: 2023-05-08 | Stop reason: SDUPTHER

## 2023-05-08 RX ORDER — RANOLAZINE 1000 MG/1
1000 TABLET, EXTENDED RELEASE ORAL 2 TIMES DAILY
Qty: 180 TABLET | Refills: 3 | Status: SHIPPED | OUTPATIENT
Start: 2023-05-08 | End: 2023-05-17 | Stop reason: SDUPTHER

## 2023-05-08 RX ORDER — ATORVASTATIN CALCIUM 20 MG/1
20 TABLET, FILM COATED ORAL DAILY
Qty: 90 TABLET | Refills: 3 | Status: SHIPPED | OUTPATIENT
Start: 2023-05-08 | End: 2023-05-17 | Stop reason: SDUPTHER

## 2023-05-08 RX ORDER — CLONIDINE HYDROCHLORIDE 0.1 MG/1
0.2 TABLET ORAL 2 TIMES DAILY PRN
Qty: 80 TABLET | Refills: 1 | Status: SHIPPED | OUTPATIENT
Start: 2023-05-08 | End: 2023-10-14 | Stop reason: SDUPTHER

## 2023-05-08 RX ORDER — OXYCODONE AND ACETAMINOPHEN 7.5; 325 MG/1; MG/1
1 TABLET ORAL EVERY 6 HOURS PRN
Qty: 60 TABLET | Refills: 0 | Status: SHIPPED | OUTPATIENT
Start: 2023-05-08 | End: 2023-06-05 | Stop reason: SDUPTHER

## 2023-05-08 RX ORDER — ALPRAZOLAM 0.5 MG/1
0.5 TABLET ORAL 2 TIMES DAILY PRN
Qty: 180 TABLET | Refills: 0 | Status: SHIPPED | OUTPATIENT
Start: 2023-05-08 | End: 2023-05-17 | Stop reason: SDUPTHER

## 2023-05-08 RX ORDER — KETOROLAC TROMETHAMINE 30 MG/ML
60 INJECTION, SOLUTION INTRAMUSCULAR; INTRAVENOUS
Status: COMPLETED | OUTPATIENT
Start: 2023-05-08 | End: 2023-05-08

## 2023-05-08 RX ADMIN — KETOROLAC TROMETHAMINE 60 MG: 30 INJECTION, SOLUTION INTRAMUSCULAR; INTRAVENOUS at 04:05

## 2023-05-08 NOTE — TELEPHONE ENCOUNTER
Spoke with pt states she bp is elevated but refuses to go to ER.   Appt scheduled for Dr. Arias today at 3pm

## 2023-05-08 NOTE — PROGRESS NOTES
Susu DANIELLE Luther  73 y.o. Black or  female    Here for follow up on hypertension.    BP high at home   Took clonidine all weekend   Took clonidine this morning     Lower extremity swelling but better with her diuretic   She has trace edema currently    Headache today    Reports taking medications as instructed.  Not taking any OTC meds.    Past Medical History:   Diagnosis Date    Cataract     Bilateral    Chronic diastolic congestive heart failure 08/25/2020    Dizziness 03/12/2023    Encounter for blood transfusion     History of repair of aneurysm of abdominal aorta using endovascular stent graft     DR Bonds     of psychiatric care     Hypertension     Major depressive disorder, single episode, moderate with anxious distress 01/14/2020    Malignant neoplasm of upper-outer quadrant of right breast in female, estrogen receptor positive 03/14/2019    radiation    Psychiatric problem     Sleep apnea     Sleep difficulties     Stroke 2009    no residual defect    Therapy          Current Outpatient Medications:     ALPRAZolam (XANAX) 0.5 MG tablet, Take 1 tablet (0.5 mg total) by mouth 2 (two) times daily as needed for Anxiety., Disp: 180 tablet, Rfl: 0    amLODIPine (NORVASC) 10 MG tablet, Take 1 tablet (10 mg total) by mouth once daily., Disp: 30 tablet, Rfl: 0    apixaban (ELIQUIS) 5 mg Tab, Take 1 tablet (5 mg total) by mouth 2 (two) times daily., Disp: 180 tablet, Rfl: 3    atorvastatin (LIPITOR) 20 MG tablet, Take 1 tablet (20 mg total) by mouth once daily., Disp: 30 tablet, Rfl: 11    busPIRone (BUSPAR) 15 MG tablet, Take 1 tablet (15 mg total) by mouth 3 (three) times daily as needed (anxiety)., Disp: 270 tablet, Rfl: 0    butalbital-acetaminophen-caffeine -40 mg (FIORICET, ESGIC) -40 mg per tablet, TAKE 1 TABLET DAILY AS NEEDED FOR PAIN OR HEADACHES., Disp: 30 tablet, Rfl: 1    cholecalciferol, vitamin D3, 1,250 mcg (50,000 unit) capsule, Take 1 capsule (50,000 Units total)  by mouth once a week., Disp: 12 capsule, Rfl: 0    cloNIDine (CATAPRES) 0.1 MG tablet, Take 1 tablet (0.1 mg total) by mouth 2 (two) times daily as needed (BP>170/95)., Disp: 60 tablet, Rfl: 1    diclofenac sodium (VOLTAREN) 1 % Gel, Apply 2 grams topically once daily., Disp: 400 g, Rfl: 1    DULoxetine (CYMBALTA) 30 MG capsule, TAKE 1 CAPSULE EVERY DAY, Disp: 90 capsule, Rfl: 2    furosemide (LASIX) 20 MG tablet, Take 1 tablet by mouth BID, Disp: 90 tablet, Rfl: 3    letrozole (FEMARA) 2.5 mg Tab, Take 1 tablet (2.5 mg total) by mouth once daily., Disp: 90 tablet, Rfl: 1    levocetirizine (XYZAL) 5 MG tablet, Take 1 tablet (5 mg total) by mouth every evening., Disp: 90 tablet, Rfl: 0    LIDOcaine (LIDODERM) 5 %, Place 2 patches onto the skin once daily. Remove & Discard patch within 12 hours or as directed by MD, Disp: 90 patch, Rfl: 1    multivitamin (THERAGRAN) per tablet, Take 1 tablet by mouth once daily., Disp: , Rfl:     oxyCODONE-acetaminophen (PERCOCET) 7.5-325 mg per tablet, Take 1 tablet by mouth every 6 (six) hours as needed for Pain., Disp: 60 tablet, Rfl: 0    pregabalin (LYRICA) 100 MG capsule, Take 1 capsule (100 mg total) by mouth 2 (two) times daily., Disp: 60 capsule, Rfl: 6    ranolazine (RANEXA) 1,000 mg Tb12, Take 1 tablet (1,000 mg total) by mouth 2 (two) times daily., Disp: 180 tablet, Rfl: 3    sacubitriL-valsartan (ENTRESTO) 49-51 mg per tablet, Take 1 tablet by mouth 2 (two) times daily., Disp: 60 tablet, Rfl: 6    venlafaxine (EFFEXOR-XR) 75 MG 24 hr capsule, Take 1 capsule (75 mg total) by mouth once daily., Disp: 90 capsule, Rfl: 3    zolpidem (AMBIEN) 5 MG Tab, Take 1 tablet (5 mg total) by mouth nightly as needed (sleep)., Disp: 90 tablet, Rfl: 1    Review of patient's allergies indicates:   Allergen Reactions    Pcn [penicillins] Hives       ROS: Denies dizziness, headache, chest pain, shortness of breath or edema    PE:  GEN: Alert and oriented, no acute distress  HEART: Normal S1  and S2, RRR, no lower extremity edema  LUNGS: Respirations unlabored, bilaterally clear to auscultation    ASSESSMENT/PLAN:  1. Hypertension  Stop effexor this was started when patient didn't remember what medication helped with hot flashes.   This has not helped will restart cymbalta    Oxybutinin has off label use for hyperhidrosis which is what helped Ms Luther     Will take 1 0.1 clonidine as directed and repeat BP and if still high take second one       Follow up as needed

## 2023-05-08 NOTE — TELEPHONE ENCOUNTER
----- Message from Roxana Bowen sent at 5/8/2023 11:05 AM CDT -----  Regarding: URGENT  Pt blood pressure extremely High 192 /128 checked it twice,Please call [phone]

## 2023-05-11 NOTE — PROGRESS NOTES
"      ===============================  Date today is 5/12/2023  Susu Luther is a 73 y.o. female  Last visit [facility]: :Visit date not found   Last visit eye dept. Visit date not found    Corrected distance visual acuity was 20/30 in the right eye and 20/40 in the left eye.  Tonometry       Tonometry (Tonopen, 10:16 AM)         Right Left    Pressure 22 25                  Not recorded       Not recorded       Not recorded       No chief complaint on file.      Problem List Items Addressed This Visit       History of pulmonary embolism    History of prediabetes    Overview     2019            Other Visit Diagnoses       Vitreous hemorrhage, left eye    -  Primary    Pseudophakia of both eyes              Instructed to call 24/7 for any worsening of vision, visual distortion or pain.  Check OU independently daily.    Gave my office and personal cell phone number.  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 Check OU daily.   Gave my office and cell phone number.    =============================      "

## 2023-05-12 ENCOUNTER — OFFICE VISIT (OUTPATIENT)
Dept: OPHTHALMOLOGY | Facility: CLINIC | Age: 73
End: 2023-05-12
Payer: MEDICARE

## 2023-05-12 DIAGNOSIS — Z87.898 HISTORY OF PREDIABETES: ICD-10-CM

## 2023-05-12 DIAGNOSIS — Z86.711 HISTORY OF PULMONARY EMBOLISM: ICD-10-CM

## 2023-05-12 DIAGNOSIS — H43.12 VITREOUS HEMORRHAGE, LEFT EYE: Primary | ICD-10-CM

## 2023-05-12 DIAGNOSIS — Z96.1 PSEUDOPHAKIA OF BOTH EYES: ICD-10-CM

## 2023-05-12 PROCEDURE — 1159F MED LIST DOCD IN RCRD: CPT | Mod: HCNC,CPTII,S$GLB, | Performed by: OPHTHALMOLOGY

## 2023-05-12 PROCEDURE — 1157F ADVNC CARE PLAN IN RCRD: CPT | Mod: HCNC,CPTII,S$GLB, | Performed by: OPHTHALMOLOGY

## 2023-05-12 PROCEDURE — 1159F PR MEDICATION LIST DOCUMENTED IN MEDICAL RECORD: ICD-10-PCS | Mod: HCNC,CPTII,S$GLB, | Performed by: OPHTHALMOLOGY

## 2023-05-12 PROCEDURE — 92004 PR EYE EXAM, NEW PATIENT,COMPREHESV: ICD-10-PCS | Mod: HCNC,S$GLB,, | Performed by: OPHTHALMOLOGY

## 2023-05-12 PROCEDURE — 99999 PR PBB SHADOW E&M-EST. PATIENT-LVL I: CPT | Mod: PBBFAC,HCNC,, | Performed by: OPHTHALMOLOGY

## 2023-05-12 PROCEDURE — 1160F PR REVIEW ALL MEDS BY PRESCRIBER/CLIN PHARMACIST DOCUMENTED: ICD-10-PCS | Mod: HCNC,CPTII,S$GLB, | Performed by: OPHTHALMOLOGY

## 2023-05-12 PROCEDURE — 92004 COMPRE OPH EXAM NEW PT 1/>: CPT | Mod: HCNC,S$GLB,, | Performed by: OPHTHALMOLOGY

## 2023-05-12 PROCEDURE — 4010F PR ACE/ARB THEARPY RXD/TAKEN: ICD-10-PCS | Mod: HCNC,CPTII,S$GLB, | Performed by: OPHTHALMOLOGY

## 2023-05-12 PROCEDURE — 99999 PR PBB SHADOW E&M-EST. PATIENT-LVL I: ICD-10-PCS | Mod: PBBFAC,HCNC,, | Performed by: OPHTHALMOLOGY

## 2023-05-12 PROCEDURE — 4010F ACE/ARB THERAPY RXD/TAKEN: CPT | Mod: HCNC,CPTII,S$GLB, | Performed by: OPHTHALMOLOGY

## 2023-05-12 PROCEDURE — 1157F PR ADVANCE CARE PLAN OR EQUIV PRESENT IN MEDICAL RECORD: ICD-10-PCS | Mod: HCNC,CPTII,S$GLB, | Performed by: OPHTHALMOLOGY

## 2023-05-12 PROCEDURE — 1160F RVW MEDS BY RX/DR IN RCRD: CPT | Mod: HCNC,CPTII,S$GLB, | Performed by: OPHTHALMOLOGY

## 2023-05-15 PROCEDURE — G0179 MD RECERTIFICATION HHA PT: HCPCS | Mod: ,,, | Performed by: FAMILY MEDICINE

## 2023-05-15 PROCEDURE — G0179 PR HOME HEALTH MD RECERTIFICATION: ICD-10-PCS | Mod: ,,, | Performed by: FAMILY MEDICINE

## 2023-05-16 RX ORDER — BUTALBITAL, ACETAMINOPHEN AND CAFFEINE 50; 325; 40 MG/1; MG/1; MG/1
TABLET ORAL
Qty: 30 TABLET | Refills: 1 | Status: SHIPPED | OUTPATIENT
Start: 2023-05-16 | End: 2023-05-17 | Stop reason: SDUPTHER

## 2023-05-16 NOTE — TELEPHONE ENCOUNTER
No care due was identified.  [facility] Embedded Care Due Messages. Reference number: [id number].   5/16/2023 8:54:42 AM CDT

## 2023-05-17 ENCOUNTER — PATIENT MESSAGE (OUTPATIENT)
Dept: INTERNAL MEDICINE | Facility: CLINIC | Age: 73
End: 2023-05-17
Payer: MEDICARE

## 2023-05-17 DIAGNOSIS — F32.1 MAJOR DEPRESSIVE DISORDER, SINGLE EPISODE, MODERATE WITH ANXIOUS DISTRESS: ICD-10-CM

## 2023-05-17 DIAGNOSIS — F54 PSYCHOLOGICAL FACTORS AFFECTING MEDICAL CONDITION: ICD-10-CM

## 2023-05-17 DIAGNOSIS — G89.3 NEOPLASM RELATED PAIN: ICD-10-CM

## 2023-05-17 DIAGNOSIS — M79.605 BILATERAL LEG PAIN: ICD-10-CM

## 2023-05-17 DIAGNOSIS — Z17.0 MALIGNANT NEOPLASM OF UPPER-OUTER QUADRANT OF RIGHT BREAST IN FEMALE, ESTROGEN RECEPTOR POSITIVE: ICD-10-CM

## 2023-05-17 DIAGNOSIS — R61 HYPERHIDROSIS: ICD-10-CM

## 2023-05-17 DIAGNOSIS — C50.411 MALIGNANT NEOPLASM OF UPPER-OUTER QUADRANT OF RIGHT BREAST IN FEMALE, ESTROGEN RECEPTOR POSITIVE: ICD-10-CM

## 2023-05-17 DIAGNOSIS — I26.99 ACUTE PULMONARY EMBOLISM, UNSPECIFIED PULMONARY EMBOLISM TYPE, UNSPECIFIED WHETHER ACUTE COR PULMONALE PRESENT: ICD-10-CM

## 2023-05-17 DIAGNOSIS — I10 UNCONTROLLED HYPERTENSION: ICD-10-CM

## 2023-05-17 DIAGNOSIS — M79.604 BILATERAL LEG PAIN: ICD-10-CM

## 2023-05-17 RX ORDER — FUROSEMIDE 20 MG/1
TABLET ORAL
Qty: 90 TABLET | Refills: 1 | Status: SHIPPED | OUTPATIENT
Start: 2023-05-17 | End: 2023-07-03

## 2023-05-17 RX ORDER — LIDOCAINE 50 MG/G
2 PATCH TOPICAL DAILY
Qty: 90 PATCH | Refills: 1 | Status: SHIPPED | OUTPATIENT
Start: 2023-05-17 | End: 2023-08-02 | Stop reason: SDUPTHER

## 2023-05-17 RX ORDER — ZOLPIDEM TARTRATE 5 MG/1
5 TABLET ORAL NIGHTLY PRN
Qty: 90 TABLET | Refills: 1 | Status: SHIPPED | OUTPATIENT
Start: 2023-05-17 | End: 2023-08-02 | Stop reason: SDUPTHER

## 2023-05-17 RX ORDER — PREGABALIN 100 MG/1
100 CAPSULE ORAL 2 TIMES DAILY
Qty: 60 CAPSULE | Refills: 0 | Status: SHIPPED | OUTPATIENT
Start: 2023-05-17 | End: 2023-07-03 | Stop reason: SDUPTHER

## 2023-05-17 RX ORDER — RANOLAZINE 1000 MG/1
1000 TABLET, EXTENDED RELEASE ORAL 2 TIMES DAILY
Qty: 180 TABLET | Refills: 3 | Status: SHIPPED | OUTPATIENT
Start: 2023-05-17 | End: 2023-08-02 | Stop reason: SDUPTHER

## 2023-05-17 RX ORDER — AMLODIPINE BESYLATE 10 MG/1
10 TABLET ORAL DAILY
Qty: 90 TABLET | Refills: 3 | Status: SHIPPED | OUTPATIENT
Start: 2023-05-17 | End: 2023-08-02 | Stop reason: SDUPTHER

## 2023-05-17 RX ORDER — ALPRAZOLAM 0.5 MG/1
0.5 TABLET ORAL 2 TIMES DAILY PRN
Qty: 180 TABLET | Refills: 0 | Status: SHIPPED | OUTPATIENT
Start: 2023-05-17 | End: 2023-08-02 | Stop reason: SDUPTHER

## 2023-05-17 RX ORDER — OXYBUTYNIN CHLORIDE 5 MG/1
5 TABLET ORAL DAILY
Qty: 90 TABLET | Refills: 3 | Status: ON HOLD | OUTPATIENT
Start: 2023-05-17 | End: 2023-07-14 | Stop reason: HOSPADM

## 2023-05-17 RX ORDER — BUSPIRONE HYDROCHLORIDE 15 MG/1
15 TABLET ORAL 3 TIMES DAILY PRN
Qty: 270 TABLET | Refills: 1 | Status: ON HOLD | OUTPATIENT
Start: 2023-05-17 | End: 2023-07-14 | Stop reason: HOSPADM

## 2023-05-17 RX ORDER — LEVOCETIRIZINE DIHYDROCHLORIDE 5 MG/1
5 TABLET, FILM COATED ORAL NIGHTLY
Qty: 90 TABLET | Refills: 0 | Status: SHIPPED | OUTPATIENT
Start: 2023-05-17 | End: 2023-07-17 | Stop reason: SDUPTHER

## 2023-05-17 RX ORDER — DICLOFENAC SODIUM 10 MG/G
2 GEL TOPICAL DAILY
Qty: 400 G | Refills: 1 | Status: SHIPPED | OUTPATIENT
Start: 2023-05-17 | End: 2023-08-02 | Stop reason: SDUPTHER

## 2023-05-17 RX ORDER — ATORVASTATIN CALCIUM 20 MG/1
20 TABLET, FILM COATED ORAL DAILY
Qty: 90 TABLET | Refills: 3 | Status: SHIPPED | OUTPATIENT
Start: 2023-05-17 | End: 2023-08-02 | Stop reason: SDUPTHER

## 2023-05-17 RX ORDER — BUTALBITAL, ACETAMINOPHEN AND CAFFEINE 50; 325; 40 MG/1; MG/1; MG/1
TABLET ORAL
Qty: 30 TABLET | Refills: 1 | Status: SHIPPED | OUTPATIENT
Start: 2023-05-17 | End: 2023-06-15 | Stop reason: SDUPTHER

## 2023-05-17 RX ORDER — LETROZOLE 2.5 MG/1
2.5 TABLET, FILM COATED ORAL DAILY
Qty: 90 TABLET | Refills: 1 | Status: SHIPPED | OUTPATIENT
Start: 2023-05-17 | End: 2023-10-06 | Stop reason: SDUPTHER

## 2023-05-17 NOTE — TELEPHONE ENCOUNTER
No care due was identified.  [facility] Embedded Care Due Messages. Reference number: [id number].   5/17/2023 4:26:01 PM CDT

## 2023-05-22 ENCOUNTER — HOSPITAL ENCOUNTER (OUTPATIENT)
Dept: RADIOLOGY | Facility: HOSPITAL | Age: 73
Discharge: HOME | End: 2023-05-22
Attending: FAMILY MEDICINE
Payer: MEDICARE

## 2023-05-22 DIAGNOSIS — N63.11 MASS OF UPPER OUTER QUADRANT OF RIGHT BREAST: ICD-10-CM

## 2023-05-22 PROCEDURE — 77065 MAMMO DIGITAL DIAGNOSTIC RIGHT WITH TOMO: ICD-10-PCS | Mod: 26,HCNC,RT, | Performed by: RADIOLOGY

## 2023-05-22 PROCEDURE — 77061 MAMMO DIGITAL DIAGNOSTIC RIGHT WITH TOMO: ICD-10-PCS | Mod: 26,HCNC,RT, | Performed by: RADIOLOGY

## 2023-05-22 PROCEDURE — 77061 BREAST TOMOSYNTHESIS UNI: CPT | Mod: 26,HCNC,RT, | Performed by: RADIOLOGY

## 2023-05-22 PROCEDURE — 76642 ULTRASOUND BREAST LIMITED: CPT | Mod: 26,HCNC,RT, | Performed by: RADIOLOGY

## 2023-05-22 PROCEDURE — 77065 DX MAMMO INCL CAD UNI: CPT | Mod: 26,HCNC,RT, | Performed by: RADIOLOGY

## 2023-05-22 PROCEDURE — 77061 BREAST TOMOSYNTHESIS UNI: CPT | Mod: TC,HCNC,RT

## 2023-05-22 PROCEDURE — 76642 ULTRASOUND BREAST LIMITED: CPT | Mod: TC,HCNC,RT

## 2023-05-22 PROCEDURE — 76642 US BREAST RIGHT LIMITED: ICD-10-PCS | Mod: 26,HCNC,RT, | Performed by: RADIOLOGY

## 2023-05-23 ENCOUNTER — PATIENT MESSAGE (OUTPATIENT)
Dept: INTERNAL MEDICINE | Facility: CLINIC | Age: 73
End: 2023-05-23
Payer: MEDICARE

## 2023-05-24 ENCOUNTER — PATIENT MESSAGE (OUTPATIENT)
Dept: CARDIOLOGY | Facility: CLINIC | Age: 73
End: 2023-05-24
Payer: MEDICARE

## 2023-05-31 ENCOUNTER — TELEPHONE (OUTPATIENT)
Dept: ADMINISTRATIVE | Facility: HOSPITAL | Age: 73
End: 2023-05-31
Payer: MEDICARE

## 2023-06-05 ENCOUNTER — OFFICE VISIT (OUTPATIENT)
Dept: INTERNAL MEDICINE | Facility: CLINIC | Age: 73
End: 2023-06-05
Payer: MEDICARE

## 2023-06-05 VITALS
DIASTOLIC BLOOD PRESSURE: 88 MMHG | BODY MASS INDEX: 43.4 KG/M2 | TEMPERATURE: 98 F | RESPIRATION RATE: 20 BRPM | OXYGEN SATURATION: 97 % | HEIGHT: 69 IN | WEIGHT: 293 LBS | HEART RATE: 85 BPM | SYSTOLIC BLOOD PRESSURE: 138 MMHG

## 2023-06-05 DIAGNOSIS — I10 HYPERTENSION, UNSPECIFIED TYPE: Primary | ICD-10-CM

## 2023-06-05 DIAGNOSIS — G89.3 NEOPLASM RELATED PAIN: ICD-10-CM

## 2023-06-05 PROCEDURE — 3075F PR MOST RECENT SYSTOLIC BLOOD PRESS GE 130-139MM HG: ICD-10-PCS | Mod: HCNC,CPTII,S$GLB, | Performed by: FAMILY MEDICINE

## 2023-06-05 PROCEDURE — 1101F PR PT FALLS ASSESS DOC 0-1 FALLS W/OUT INJ PAST YR: ICD-10-PCS | Mod: HCNC,CPTII,S$GLB, | Performed by: FAMILY MEDICINE

## 2023-06-05 PROCEDURE — 99214 OFFICE O/P EST MOD 30 MIN: CPT | Mod: HCNC,S$GLB,, | Performed by: FAMILY MEDICINE

## 2023-06-05 PROCEDURE — 4010F ACE/ARB THERAPY RXD/TAKEN: CPT | Mod: HCNC,CPTII,S$GLB, | Performed by: FAMILY MEDICINE

## 2023-06-05 PROCEDURE — 1157F ADVNC CARE PLAN IN RCRD: CPT | Mod: HCNC,CPTII,S$GLB, | Performed by: FAMILY MEDICINE

## 2023-06-05 PROCEDURE — 3075F SYST BP GE 130 - 139MM HG: CPT | Mod: HCNC,CPTII,S$GLB, | Performed by: FAMILY MEDICINE

## 2023-06-05 PROCEDURE — 3008F PR BODY MASS INDEX (BMI) DOCUMENTED: ICD-10-PCS | Mod: HCNC,CPTII,S$GLB, | Performed by: FAMILY MEDICINE

## 2023-06-05 PROCEDURE — 3288F PR FALLS RISK ASSESSMENT DOCUMENTED: ICD-10-PCS | Mod: HCNC,CPTII,S$GLB, | Performed by: FAMILY MEDICINE

## 2023-06-05 PROCEDURE — 1101F PT FALLS ASSESS-DOCD LE1/YR: CPT | Mod: HCNC,CPTII,S$GLB, | Performed by: FAMILY MEDICINE

## 2023-06-05 PROCEDURE — 1159F PR MEDICATION LIST DOCUMENTED IN MEDICAL RECORD: ICD-10-PCS | Mod: HCNC,CPTII,S$GLB, | Performed by: FAMILY MEDICINE

## 2023-06-05 PROCEDURE — 99999 PR PBB SHADOW E&M-EST. PATIENT-LVL IV: ICD-10-PCS | Mod: PBBFAC,HCNC,, | Performed by: FAMILY MEDICINE

## 2023-06-05 PROCEDURE — 3079F DIAST BP 80-89 MM HG: CPT | Mod: HCNC,CPTII,S$GLB, | Performed by: FAMILY MEDICINE

## 2023-06-05 PROCEDURE — 3008F BODY MASS INDEX DOCD: CPT | Mod: HCNC,CPTII,S$GLB, | Performed by: FAMILY MEDICINE

## 2023-06-05 PROCEDURE — 4010F PR ACE/ARB THEARPY RXD/TAKEN: ICD-10-PCS | Mod: HCNC,CPTII,S$GLB, | Performed by: FAMILY MEDICINE

## 2023-06-05 PROCEDURE — 99214 PR OFFICE/OUTPT VISIT, EST, LEVL IV, 30-39 MIN: ICD-10-PCS | Mod: HCNC,S$GLB,, | Performed by: FAMILY MEDICINE

## 2023-06-05 PROCEDURE — 3079F PR MOST RECENT DIASTOLIC BLOOD PRESSURE 80-89 MM HG: ICD-10-PCS | Mod: HCNC,CPTII,S$GLB, | Performed by: FAMILY MEDICINE

## 2023-06-05 PROCEDURE — 1157F PR ADVANCE CARE PLAN OR EQUIV PRESENT IN MEDICAL RECORD: ICD-10-PCS | Mod: HCNC,CPTII,S$GLB, | Performed by: FAMILY MEDICINE

## 2023-06-05 PROCEDURE — 1159F MED LIST DOCD IN RCRD: CPT | Mod: HCNC,CPTII,S$GLB, | Performed by: FAMILY MEDICINE

## 2023-06-05 PROCEDURE — 99999 PR PBB SHADOW E&M-EST. PATIENT-LVL IV: CPT | Mod: PBBFAC,HCNC,, | Performed by: FAMILY MEDICINE

## 2023-06-05 PROCEDURE — 3288F FALL RISK ASSESSMENT DOCD: CPT | Mod: HCNC,CPTII,S$GLB, | Performed by: FAMILY MEDICINE

## 2023-06-05 RX ORDER — OXYCODONE AND ACETAMINOPHEN 7.5; 325 MG/1; MG/1
1 TABLET ORAL EVERY 6 HOURS PRN
Qty: 60 TABLET | Refills: 0 | Status: SHIPPED | OUTPATIENT
Start: 2023-06-05 | End: 2024-02-02 | Stop reason: ALTCHOICE

## 2023-06-05 RX ORDER — OXYCODONE AND ACETAMINOPHEN 7.5; 325 MG/1; MG/1
1 TABLET ORAL EVERY 6 HOURS PRN
Qty: 60 TABLET | Refills: 0 | Status: SHIPPED | OUTPATIENT
Start: 2023-06-05 | End: 2023-06-05 | Stop reason: SDUPTHER

## 2023-06-05 RX ORDER — TIZANIDINE 4 MG/1
4 TABLET ORAL EVERY 6 HOURS PRN
Qty: 40 TABLET | Refills: 2 | Status: ON HOLD | OUTPATIENT
Start: 2023-06-05 | End: 2023-07-14 | Stop reason: HOSPADM

## 2023-06-05 RX ORDER — TIZANIDINE 4 MG/1
4 TABLET ORAL EVERY 6 HOURS PRN
Qty: 40 TABLET | Refills: 2 | Status: SHIPPED | OUTPATIENT
Start: 2023-06-05 | End: 2023-06-05 | Stop reason: SDUPTHER
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
## 2023-06-07 ENCOUNTER — PATIENT MESSAGE (OUTPATIENT)
Dept: INTERNAL MEDICINE | Facility: CLINIC | Age: 73
End: 2023-06-07
Payer: MEDICARE

## 2023-06-07 ENCOUNTER — EXTERNAL HOME HEALTH (OUTPATIENT)
Dept: HOME HEALTH SERVICES | Facility: HOSPITAL | Age: 73
End: 2023-06-07
Payer: MEDICARE

## 2023-06-09 ENCOUNTER — TELEPHONE (OUTPATIENT)
Dept: HEMATOLOGY/ONCOLOGY | Facility: CLINIC | Age: 73
End: 2023-06-09
Payer: MEDICARE

## 2023-06-09 NOTE — TELEPHONE ENCOUNTER
Jaylyn Kraft NP to Malgorzata Benitez MA  My 130pm patient for Monday she's supposed to have labs drawn including iron which would need to be drawn before Monday so I can have the results in time for her visit with me.She's scheduled a month early. She's supposed to have a DEXA scan in 7/2023 and follow up with me in 7/2023 with the results. It's scheduled for 7/28/2023, so if she's open to having it moved up, I can see her sooner. Either way, Monday is too soon; she's a 6 month follow up and it's only been 5 months.    Malgorzata Benitez MA to Jaylyn Kraft NP   she has been reschedule pt also wanted to see you Monday but was ok with rescheduling bc she wanted to discuss knot that's in her breast, she said she saw dr rios on Friday she had a sonogram and mammo done but dr rios told her don't worry about it it could just be scar tissue. But Dr rios did tell her it shouldn't be as painful as it is. pt states its also often red. she would like to know what do you recommend. I told her I would reach out to you and see if there is anything additional you would like to do from a hem/oc standpoint. Pt verbalized her understanding.    Jaylyn Kraft NP to Malgorzata Benitez MA  Oh ok. Her appointment note said this visit was a follow up. Didn't know she was having issues. If she's more comfortable with seeing me Monday and next month, that's fine. Based on her MMG, I would suggest she cut back on her caffeine intake and chocolate. Those things can worsen fibrocystic breast changes which it looks like she has.    Malgorzata Benitez MA to Jaylyn Kraft NP  okay, spoke with pt she said she will cut back on caffeine and in 2 weeks if it hasnt changed she will set up to do a vv with you to discuss further

## 2023-06-12 ENCOUNTER — TELEPHONE (OUTPATIENT)
Dept: INTERNAL MEDICINE | Facility: CLINIC | Age: 73
End: 2023-06-12
Payer: MEDICARE

## 2023-06-12 ENCOUNTER — TELEPHONE (OUTPATIENT)
Dept: HEMATOLOGY/ONCOLOGY | Facility: CLINIC | Age: 73
End: 2023-06-12
Payer: MEDICARE

## 2023-06-12 NOTE — TELEPHONE ENCOUNTER
----- Message from Trinidad Hong MA sent at 6/12/2023 12:03 PM CDT -----  Contact: Nilam @ [phone]  Nilam calling from [facility].The patient is complaining of dizziness and blurred vision. Blood pressure is 124/74. Please give her a call back at [phone].

## 2023-06-12 NOTE — TELEPHONE ENCOUNTER
Home health called to report pt still being dizzy and having blurred vision   States its not worse than she has complained of before  Offered appt and was refused.  Will call us back if needed

## 2023-06-12 NOTE — TELEPHONE ENCOUNTER
----- Message from Estefany Galvin sent at 6/12/2023  9:56 AM CDT -----  Contact: Susu  Patient is calling in regards to appt. Reports having an appt for 6/12 but was rescheduled to August. Patient states not coming in in over 6 months and would not like to wait until august to be seen. Please return  call at [phone]

## 2023-06-13 ENCOUNTER — TELEPHONE (OUTPATIENT)
Dept: HEMATOLOGY/ONCOLOGY | Facility: CLINIC | Age: 73
End: 2023-06-13
Payer: MEDICARE

## 2023-06-13 NOTE — TELEPHONE ENCOUNTER
----- Message from JACKELINE Stewart sent at 6/12/2023  4:42 PM CDT -----  If she's having issues, I can see her sooner.     ----- Message -----  From: Iqra Payan MA  Sent: 6/12/2023   3:55 PM CDT  To: JACKELINE Stewart    Spoke with patient on the phone, she states that she's not feeling well and is a bit confused on her appointments. I explained to the patient that her apt in August would be to go over her DEXA scan and lab results. Patient states that she's worried about her scan and mammo results and doesn't feel she should wait this long an earlier apt was offered in which the patient was hesitant and stated that she wanted to see what you thought. Please advise.   ----- Message -----  From: Colleen Newton  Sent: 6/12/2023   3:38 PM CDT  To: Swapnil De [facility] Staff    Pt is requesting a call back concerning the call she just missed. Call back at [phone]  Thx jm

## 2023-06-15 RX ORDER — BUTALBITAL, ACETAMINOPHEN AND CAFFEINE 50; 325; 40 MG/1; MG/1; MG/1
TABLET ORAL
Qty: 30 TABLET | Refills: 1 | Status: SHIPPED | OUTPATIENT
Start: 2023-06-15 | End: 2023-06-29 | Stop reason: SDUPTHER

## 2023-06-15 RX ORDER — BUTALBITAL, ACETAMINOPHEN AND CAFFEINE 50; 325; 40 MG/1; MG/1; MG/1
TABLET ORAL
Qty: 30 TABLET | Refills: 1 | Status: SHIPPED | OUTPATIENT
Start: 2023-06-15 | End: 2023-06-15 | Stop reason: SDUPTHER

## 2023-06-19 PROBLEM — N17.9 AKI (ACUTE KIDNEY INJURY): Status: RESOLVED | Noted: 2022-02-15 | Resolved: 2023-06-19

## 2023-06-29 RX ORDER — BUTALBITAL, ACETAMINOPHEN AND CAFFEINE 50; 325; 40 MG/1; MG/1; MG/1
TABLET ORAL
Qty: 30 TABLET | Refills: 1 | Status: SHIPPED | OUTPATIENT
Start: 2023-06-29 | End: 2023-09-08

## 2023-06-29 NOTE — TELEPHONE ENCOUNTER
No care due was identified.  [facility] Embedded Care Due Messages. Reference number: [id number].   6/29/2023 2:53:00 PM CDT

## 2023-07-03 ENCOUNTER — TELEPHONE (OUTPATIENT)
Dept: ADMINISTRATIVE | Facility: HOSPITAL | Age: 73
End: 2023-07-03
Payer: MEDICARE

## 2023-07-03 ENCOUNTER — OFFICE VISIT (OUTPATIENT)
Dept: FAMILY MEDICINE | Facility: CLINIC | Age: 73
End: 2023-07-03
Payer: MEDICARE

## 2023-07-03 VITALS
SYSTOLIC BLOOD PRESSURE: 130 MMHG | BODY MASS INDEX: 43.4 KG/M2 | OXYGEN SATURATION: 98 % | WEIGHT: 293 LBS | RESPIRATION RATE: 18 BRPM | DIASTOLIC BLOOD PRESSURE: 76 MMHG | TEMPERATURE: 99 F | HEIGHT: 69 IN | HEART RATE: 97 BPM

## 2023-07-03 DIAGNOSIS — R51.9 NONINTRACTABLE HEADACHE, UNSPECIFIED CHRONICITY PATTERN, UNSPECIFIED HEADACHE TYPE: ICD-10-CM

## 2023-07-03 DIAGNOSIS — C50.411 MALIGNANT NEOPLASM OF UPPER-OUTER QUADRANT OF RIGHT BREAST IN FEMALE, ESTROGEN RECEPTOR POSITIVE: ICD-10-CM

## 2023-07-03 DIAGNOSIS — Z76.89 ESTABLISHING CARE WITH NEW DOCTOR, ENCOUNTER FOR: Primary | ICD-10-CM

## 2023-07-03 DIAGNOSIS — M79.89 LEG SWELLING: ICD-10-CM

## 2023-07-03 DIAGNOSIS — I10 HYPERTENSION, UNSPECIFIED TYPE: ICD-10-CM

## 2023-07-03 DIAGNOSIS — Z17.0 MALIGNANT NEOPLASM OF UPPER-OUTER QUADRANT OF RIGHT BREAST IN FEMALE, ESTROGEN RECEPTOR POSITIVE: ICD-10-CM

## 2023-07-03 DIAGNOSIS — G89.3 NEOPLASM RELATED PAIN: ICD-10-CM

## 2023-07-03 PROCEDURE — 3008F BODY MASS INDEX DOCD: CPT | Mod: HCNC,CPTII,S$GLB, | Performed by: NURSE PRACTITIONER

## 2023-07-03 PROCEDURE — 1160F PR REVIEW ALL MEDS BY PRESCRIBER/CLIN PHARMACIST DOCUMENTED: ICD-10-PCS | Mod: HCNC,CPTII,S$GLB, | Performed by: NURSE PRACTITIONER

## 2023-07-03 PROCEDURE — 4010F PR ACE/ARB THEARPY RXD/TAKEN: ICD-10-PCS | Mod: HCNC,CPTII,S$GLB, | Performed by: NURSE PRACTITIONER

## 2023-07-03 PROCEDURE — 1160F RVW MEDS BY RX/DR IN RCRD: CPT | Mod: HCNC,CPTII,S$GLB, | Performed by: NURSE PRACTITIONER

## 2023-07-03 PROCEDURE — 3008F PR BODY MASS INDEX (BMI) DOCUMENTED: ICD-10-PCS | Mod: HCNC,CPTII,S$GLB, | Performed by: NURSE PRACTITIONER

## 2023-07-03 PROCEDURE — 3075F PR MOST RECENT SYSTOLIC BLOOD PRESS GE 130-139MM HG: ICD-10-PCS | Mod: HCNC,CPTII,S$GLB, | Performed by: NURSE PRACTITIONER

## 2023-07-03 PROCEDURE — 3078F DIAST BP <80 MM HG: CPT | Mod: HCNC,CPTII,S$GLB, | Performed by: NURSE PRACTITIONER

## 2023-07-03 PROCEDURE — 99214 OFFICE O/P EST MOD 30 MIN: CPT | Mod: HCNC,S$GLB,, | Performed by: NURSE PRACTITIONER

## 2023-07-03 PROCEDURE — 3078F PR MOST RECENT DIASTOLIC BLOOD PRESSURE < 80 MM HG: ICD-10-PCS | Mod: HCNC,CPTII,S$GLB, | Performed by: NURSE PRACTITIONER

## 2023-07-03 PROCEDURE — 3075F SYST BP GE 130 - 139MM HG: CPT | Mod: HCNC,CPTII,S$GLB, | Performed by: NURSE PRACTITIONER

## 2023-07-03 PROCEDURE — 1159F MED LIST DOCD IN RCRD: CPT | Mod: HCNC,CPTII,S$GLB, | Performed by: NURSE PRACTITIONER

## 2023-07-03 PROCEDURE — 1159F PR MEDICATION LIST DOCUMENTED IN MEDICAL RECORD: ICD-10-PCS | Mod: HCNC,CPTII,S$GLB, | Performed by: NURSE PRACTITIONER

## 2023-07-03 PROCEDURE — 4010F ACE/ARB THERAPY RXD/TAKEN: CPT | Mod: HCNC,CPTII,S$GLB, | Performed by: NURSE PRACTITIONER

## 2023-07-03 PROCEDURE — 99214 PR OFFICE/OUTPT VISIT, EST, LEVL IV, 30-39 MIN: ICD-10-PCS | Mod: HCNC,S$GLB,, | Performed by: NURSE PRACTITIONER

## 2023-07-03 PROCEDURE — 99999 PR PBB SHADOW E&M-EST. PATIENT-LVL IV: ICD-10-PCS | Mod: PBBFAC,HCNC,, | Performed by: NURSE PRACTITIONER

## 2023-07-03 PROCEDURE — 1157F ADVNC CARE PLAN IN RCRD: CPT | Mod: HCNC,CPTII,S$GLB, | Performed by: NURSE PRACTITIONER

## 2023-07-03 PROCEDURE — 1157F PR ADVANCE CARE PLAN OR EQUIV PRESENT IN MEDICAL RECORD: ICD-10-PCS | Mod: HCNC,CPTII,S$GLB, | Performed by: NURSE PRACTITIONER

## 2023-07-03 PROCEDURE — 99999 PR PBB SHADOW E&M-EST. PATIENT-LVL IV: CPT | Mod: PBBFAC,HCNC,, | Performed by: NURSE PRACTITIONER

## 2023-07-03 RX ORDER — FUROSEMIDE 40 MG/1
40 TABLET ORAL 2 TIMES DAILY
Qty: 60 TABLET | Refills: 11 | Status: ON HOLD | OUTPATIENT
Start: 2023-07-03 | End: 2023-07-14 | Stop reason: HOSPADM

## 2023-07-03 RX ORDER — BUTALBITAL, ACETAMINOPHEN AND CAFFEINE 50; 325; 40 MG/1; MG/1; MG/1
TABLET ORAL
Qty: 30 TABLET | Refills: 1 | Status: SHIPPED | OUTPATIENT
Start: 2023-07-03 | End: 2023-07-17 | Stop reason: SDUPTHER

## 2023-07-03 RX ORDER — PREGABALIN 100 MG/1
100 CAPSULE ORAL 2 TIMES DAILY
Qty: 60 CAPSULE | Refills: 0 | Status: ON HOLD | OUTPATIENT
Start: 2023-07-03 | End: 2023-07-14 | Stop reason: HOSPADM

## 2023-07-03 NOTE — PROGRESS NOTES
Susu Luther  07/03/2023  [id number]    Peace Arias MD  Patient Care Team:  Peace Arias MD as PCP - General (Internal Medicine)  Wilda Lopez LMSW as  (Hematology and Oncology)  Elana Couch LPN as Care Coordinator  Jose Clarke MD as Consulting Physician (Hematology and Oncology)  Arlene Hobbs MD as Consulting Physician (Cardiology)  Raul Boyd OD as Consulting Physician (Optometry)  Cecily Aviles, PhD as Consulting Physician (Psychiatry)  Blank Cortez, NP as Nurse Practitioner (Pulmonary Disease)  Adi Wellington III, MD as Consulting Physician (Radiation Oncology)  Rubi Sanchez MD as Consulting Physician (Dermatology)  Rogelio Vincent MD (Inactive) as Consulting Physician (Vascular Surgery)          Visit Type:an urgent visit for a new problem    Chief Complaint:  No chief complaint on file.      History of Present Illness:  73  year old female presents with complaint of bilateral leg swelling for the past 3 weeks.  Also reports HA for one week.   OTC Advil with no relief.  Pt would est care with Dr. Hardin.   Denies sinus pressure, nasal congestion, ear pain, fever, cough, chills, body aches, chest pain, nausea, vomiting, diarrhea, abdominal pain, weakness, shortness of breath, wheezing.   History:  Past Medical History:   Diagnosis Date    Cataract     Bilateral    Chronic diastolic congestive heart failure 08/25/2020    Dizziness 03/12/2023    Encounter for blood transfusion     History of repair of aneurysm of abdominal aorta using endovascular stent graft     DR Bonds     of psychiatric care     Hypertension     Major depressive disorder, single episode, moderate with anxious distress 01/14/2020    Malignant neoplasm of upper-outer quadrant of right breast in female, estrogen receptor positive 03/14/2019    radiation    Psychiatric problem     Sleep apnea     Sleep difficulties     Stroke 2009    no residual defect    Therapy      Past  Surgical History:   Procedure Laterality Date    APPENDECTOMY      AXILLARY NODE DISSECTION Right 2020    Procedure: LYMPHADENECTOMY, AXILLARY;  Surgeon: Artemio Starks MD;  Location: [facility] OR;  Service: General;  Laterality: Right;    BIOPSY OF AXILLARY NODE Right 2021    Procedure: BIOPSY, LYMPH NODE, AXILLARY;  Surgeon: Artemio Sutton MD;  Location: [facility] OR [address] [place] [address];  Service: General;  Laterality: Right;    BREAST BIOPSY          BREAST LUMPECTOMY      2019     SECTION      x 1    OOPHORECTOMY      right     SENTINEL LYMPH NODE BIOPSY Right 2019    Procedure: BIOPSY, LYMPH NODE, SENTINEL;  Surgeon: Artemio Starks MD;  Location: [facility];  Service: General;  Laterality: Right;    splenic artery aneurysm repair      TONSILLECTOMY       Family History   Problem Relation Age of Onset    Diabetes Maternal Grandmother     Diabetes Maternal Grandfather     Diabetes Mother     Hypertension Mother     Sickle cell anemia Daughter     Breast cancer Neg Hx     Colon cancer Neg Hx     Ovarian cancer Neg Hx      Social History     Socioeconomic History    Marital status:      Spouse name: Campos Luther    Number of children: 2   Tobacco Use    Smoking status: Never     Passive exposure: Past    Smokeless tobacco: Never   Substance and Sexual Activity    Alcohol use: No    Drug use: No    Sexual activity: Yes     Partners: Male     Birth control/protection: Post-menopausal     Patient Active Problem List   Diagnosis    Shortness of breath on exertion    NILS (obstructive sleep apnea)    Malignant neoplasm of upper-outer quadrant of right breast in female, estrogen receptor positive    Hypertension    Morbid obesity with BMI of 45.0-49.9, adult    Headache    Circadian rhythm sleep disorder    Nocturnal hypoxemia    Major depressive disorder, single episode, moderate with anxious distress    Speech disturbance    Blurry vision    Psychological factors affecting medical condition     SOB (shortness of breath)    Lymphadenopathy    Porcelain gallbladder    Axillary mass    Splenic artery aneurysm    Chronic kidney disease, stage 3a    Other chest pain    Acute pulmonary embolism    Primary osteoarthritis of right hip    History of pulmonary embolism    Multiple thyroid nodules    History of prediabetes    Right sided weakness    Neuropathic pain    Neoplasm related pain    Chronic diastolic congestive heart failure    Bradycardia    Neurological complaint    Orthostatic hypotension     Review of patient's allergies indicates:   Allergen Reactions    Pcn [penicillins] Hives       The following were reviewed at this visit: active problem list, medication list, allergies, family history, social history, and health maintenance.    Medications:  Current Outpatient Medications on File Prior to Visit   Medication Sig Dispense Refill    ALPRAZolam (XANAX) 0.5 MG tablet Take 1 tablet (0.5 mg total) by mouth 2 (two) times daily as needed for Anxiety. 180 tablet 0    amLODIPine (NORVASC) 10 MG tablet Take 1 tablet (10 mg total) by mouth once daily. 90 tablet 3    apixaban (ELIQUIS) 5 mg Tab Take 1 tablet (5 mg total) by mouth 2 (two) times daily. 180 tablet 1    atorvastatin (LIPITOR) 20 MG tablet Take 1 tablet (20 mg total) by mouth once daily. 90 tablet 3    busPIRone (BUSPAR) 15 MG tablet Take 1 tablet (15 mg total) by mouth 3 (three) times daily as needed (anxiety). 270 tablet 1    cholecalciferol, vitamin D3, 1,250 mcg (50,000 unit) capsule Take 1 capsule (50,000 Units total) by mouth once a week. 12 capsule 0    cloNIDine (CATAPRES) 0.1 MG tablet Take 2 tablets (0.2 mg total) by mouth 2 (two) times daily as needed (BP>170/95). 80 tablet 1    diclofenac sodium (VOLTAREN) 1 % Gel Apply 2 grams topically once daily. 400 g 1    DULoxetine (CYMBALTA) 30 MG capsule Take 1 capsule (30 mg total) by mouth once daily. 90 capsule 3    letrozole (FEMARA) 2.5 mg Tab Take 1 tablet (2.5 mg total) by mouth once  daily. 90 tablet 1    levocetirizine (XYZAL) 5 MG tablet Take 1 tablet (5 mg total) by mouth every evening. 90 tablet 0    LIDOcaine (LIDODERM) 5 % Place 2 patches onto the skin once daily. Remove & Discard patch within 12 hours or as directed by MD 90 patch 1    multivitamin (THERAGRAN) per tablet Take 1 tablet by mouth once daily.      oxybutynin (DITROPAN) 5 MG Tab Take 1 tablet (5 mg total) by mouth once daily. 90 tablet 3    oxyCODONE-acetaminophen (PERCOCET) 7.5-325 mg per tablet Take 1 tablet by mouth every 6 (six) hours as needed for Pain. 60 tablet 0    ranolazine (RANEXA) 1,000 mg Tb12 Take 1 tablet (1,000 mg total) by mouth 2 (two) times daily. 180 tablet 3    tiZANidine (ZANAFLEX) 4 MG tablet Take 1 tablet (4 mg total) by mouth every 6 (six) hours as needed (muscle pain). 40 tablet 2    zolpidem (AMBIEN) 5 MG Tab Take 1 tablet (5 mg total) by mouth nightly as needed (sleep). 90 tablet 1    [DISCONTINUED] butalbital-acetaminophen-caffeine -40 mg (FIORICET, ESGIC) -40 mg per tablet TAKE 1 TABLET DAILY AS NEEDED FOR PAIN OR HEADACHES. 30 tablet 1    [DISCONTINUED] furosemide (LASIX) 20 MG tablet Take 1 tablet by mouth BID 90 tablet 1    [DISCONTINUED] pregabalin (LYRICA) 100 MG capsule Take 1 capsule (100 mg total) by mouth 2 (two) times daily. 60 capsule 0    [DISCONTINUED] sacubitriL-valsartan (ENTRESTO) 49-51 mg per tablet Take 1 tablet by mouth 2 (two) times daily. 180 tablet 3     No current facility-administered medications on file prior to visit.       Medications have been reviewed and reconciled with patient at this visit.  Barriers to medications reviewed with patient.    Adverse reactions to current medications reviewed with patient..    Over the counter medications reviewed and reconciled with patient.    Exam:  Wt Readings from Last 3 Encounters:   07/03/23 136 kg (299 lb 13.2 oz)   06/05/23 (!) 138.5 kg (305 lb 5.4 oz)   05/08/23 (!) 138.9 kg (306 lb 3.5 oz)     Temp Readings from  Last 3 Encounters:   23 98.6 °F (37 °C) (Tympanic)   23 97.5 °F (36.4 °C) (Tympanic)   23 97.1 °F (36.2 °C)     BP Readings from Last 3 Encounters:   23 130/76   23 138/88   23 (!) 162/98     Pulse Readings from Last 3 Encounters:   23 97   23 85   23 62     Body mass index is 44.28 kg/m².      Review of Systems   Constitutional:  Negative for fever.   Respiratory:  Negative for cough, shortness of breath and wheezing.    Cardiovascular:  Positive for leg swelling. Negative for chest pain and palpitations.   Gastrointestinal:  Negative for nausea.   Neurological:  Positive for headaches. Negative for speech change and weakness.   All other systems reviewed and are negative.  Physical Exam  Vitals and nursing note reviewed.   Constitutional:       Appearance: Normal appearance. She is obese.   HENT:      Head: Normocephalic and atraumatic.      Right Ear: Tympanic membrane, ear canal and external ear normal.      Left Ear: Tympanic membrane, ear canal and external ear normal.      Nose: Nose normal.      Mouth/Throat:      Mouth: Mucous membranes are moist.      Pharynx: Oropharynx is clear.   Eyes:      Extraocular Movements: Extraocular movements intact.      Conjunctiva/sclera: Conjunctivae normal.      Pupils: Pupils are equal, round, and reactive to light.   Cardiovascular:      Rate and Rhythm: Normal rate and regular rhythm.      Pulses: Normal pulses.           Dorsalis pedis pulses are 2+ on the right side and 2+ on the left side.      Heart sounds: Normal heart sounds.   Pulmonary:      Effort: Pulmonary effort is normal.      Breath sounds: Normal breath sounds.   Abdominal:      General: Bowel sounds are normal.      Palpations: Abdomen is soft.   Musculoskeletal:         General: Normal range of motion.      Cervical back: Normal range of motion and neck supple.      Right lower le+ Edema present.      Left lower le+ Edema present.   Skin:      General: Skin is warm and dry.      Capillary Refill: Capillary refill takes less than 2 seconds.   Neurological:      General: No focal deficit present.      Mental Status: She is alert and oriented to person, place, and time.   Psychiatric:         Mood and Affect: Mood normal.         Behavior: Behavior normal.         Thought Content: Thought content normal.         Judgment: Judgment normal.       Laboratory Reviewed ({Yes)  Lab Results   Component Value Date    WBC 8.65 03/20/2023    HGB 11.8 (L) 03/20/2023    HCT 38.0 03/20/2023     03/20/2023    CHOL 234 (H) 02/28/2023    TRIG 191 (H) 02/28/2023    HDL 44 02/28/2023    ALT 12 03/11/2023    ALT 12 03/11/2023    AST 12 03/11/2023    AST 12 03/11/2023     03/20/2023    K 4.4 03/20/2023     03/20/2023    CREATININE 1.2 03/20/2023    BUN 16 03/20/2023    CO2 30 (H) 03/20/2023    TSH 1.691 03/10/2023    INR 1.1 03/11/2023    HGBA1C 5.9 (H) 05/18/2019       Diagnoses and all orders for this visit:    Establishing care with new doctor, encounter for    Nonintractable headache, unspecified chronicity pattern, unspecified headache type  -     butalbital-acetaminophen-caffeine -40 mg (FIORICET, ESGIC) -40 mg per tablet; TAKE 1 TABLET DAILY AS NEEDED FOR PAIN OR HEADACHES.    Leg swelling  -     furosemide (LASIX) 40 MG tablet; Take 1 tablet (40 mg total) by mouth 2 (two) times daily.    Malignant neoplasm of upper-outer quadrant of right breast in female, estrogen receptor positive  -     pregabalin (LYRICA) 100 MG capsule; Take 1 capsule (100 mg total) by mouth 2 (two) times daily.    Neoplasm related pain  -     pregabalin (LYRICA) 100 MG capsule; Take 1 capsule (100 mg total) by mouth 2 (two) times daily.    Hypertension, unspecified type  -     sacubitriL-valsartan (ENTRESTO) 49-51 mg per tablet; Take 1 tablet by mouth 2 (two) times daily.        -Follow up 3 mos for labs   -wear compression stockings   -elevate legs       Care  Plan/Goals: Reviewed    Goals         Blood Pressure < 150/90 (pt-stated)       Exercise at least 150 minutes per week.       Take at least one BP reading per week at various times of the day           Diagnoses and all orders for this visit:    Establishing care with new doctor, encounter for    Nonintractable headache, unspecified chronicity pattern, unspecified headache type  -     butalbital-acetaminophen-caffeine -40 mg (FIORICET, ESGIC) -40 mg per tablet; TAKE 1 TABLET DAILY AS NEEDED FOR PAIN OR HEADACHES.    Leg swelling  -     furosemide (LASIX) 40 MG tablet; Take 1 tablet (40 mg total) by mouth 2 (two) times daily.    Malignant neoplasm of upper-outer quadrant of right breast in female, estrogen receptor positive  -     pregabalin (LYRICA) 100 MG capsule; Take 1 capsule (100 mg total) by mouth 2 (two) times daily.    Neoplasm related pain  -     pregabalin (LYRICA) 100 MG capsule; Take 1 capsule (100 mg total) by mouth 2 (two) times daily.    Hypertension, unspecified type  -     sacubitriL-valsartan (ENTRESTO) 49-51 mg per tablet; Take 1 tablet by mouth 2 (two) times daily.       Follow up: Follow up if symptoms worsen or fail to improve.    After visit summary was printed and given to patient upon discharge today.  Patient goals and care plan are included in After Visit Summary.

## 2023-07-06 ENCOUNTER — OFFICE VISIT (OUTPATIENT)
Dept: FAMILY MEDICINE | Facility: CLINIC | Age: 73
End: 2023-07-06
Payer: MEDICARE

## 2023-07-06 ENCOUNTER — PATIENT MESSAGE (OUTPATIENT)
Dept: CARDIOLOGY | Facility: CLINIC | Age: 73
End: 2023-07-06
Payer: MEDICARE

## 2023-07-06 ENCOUNTER — PATIENT MESSAGE (OUTPATIENT)
Dept: FAMILY MEDICINE | Facility: CLINIC | Age: 73
End: 2023-07-06

## 2023-07-06 DIAGNOSIS — R60.0 BILATERAL LEG EDEMA: Primary | ICD-10-CM

## 2023-07-06 PROCEDURE — 99214 OFFICE O/P EST MOD 30 MIN: CPT | Mod: HCNC,95,, | Performed by: NURSE PRACTITIONER

## 2023-07-06 PROCEDURE — 1159F PR MEDICATION LIST DOCUMENTED IN MEDICAL RECORD: ICD-10-PCS | Mod: HCNC,CPTII,95, | Performed by: NURSE PRACTITIONER

## 2023-07-06 PROCEDURE — 1157F PR ADVANCE CARE PLAN OR EQUIV PRESENT IN MEDICAL RECORD: ICD-10-PCS | Mod: HCNC,CPTII,95, | Performed by: NURSE PRACTITIONER

## 2023-07-06 PROCEDURE — 99214 PR OFFICE/OUTPT VISIT, EST, LEVL IV, 30-39 MIN: ICD-10-PCS | Mod: HCNC,95,, | Performed by: NURSE PRACTITIONER

## 2023-07-06 PROCEDURE — 1160F RVW MEDS BY RX/DR IN RCRD: CPT | Mod: HCNC,CPTII,95, | Performed by: NURSE PRACTITIONER

## 2023-07-06 PROCEDURE — 1157F ADVNC CARE PLAN IN RCRD: CPT | Mod: HCNC,CPTII,95, | Performed by: NURSE PRACTITIONER

## 2023-07-06 PROCEDURE — 1160F PR REVIEW ALL MEDS BY PRESCRIBER/CLIN PHARMACIST DOCUMENTED: ICD-10-PCS | Mod: HCNC,CPTII,95, | Performed by: NURSE PRACTITIONER

## 2023-07-06 PROCEDURE — 4010F PR ACE/ARB THEARPY RXD/TAKEN: ICD-10-PCS | Mod: HCNC,CPTII,95, | Performed by: NURSE PRACTITIONER

## 2023-07-06 PROCEDURE — 1159F MED LIST DOCD IN RCRD: CPT | Mod: HCNC,CPTII,95, | Performed by: NURSE PRACTITIONER

## 2023-07-06 PROCEDURE — 4010F ACE/ARB THERAPY RXD/TAKEN: CPT | Mod: HCNC,CPTII,95, | Performed by: NURSE PRACTITIONER

## 2023-07-06 NOTE — PROGRESS NOTES
Susu Luther  07/06/2023  [id number]      The patient location is: home   The chief complaint leading to consultation is: increased leg swelling and SOB   Visit type: Virtual visit with synchronous audio and video  Total time spent with patient: 10 mins   Each patient to whom he or she provides medical services by telemedicine is:  (1) informed of the relationship between the physician and patient and the respective role of any other health care provider with respect to management of the patient; and (2) notified that he or she may decline to receive medical services by telemedicine and may withdraw from such care at any time.        Chief Complaint:      History of Present Illness:    74 yo female presents via telemedicine with co increased bilateral leg swelling and foot pain for the past three days. Pt reports she pt unbale to put on shoes  has been taking 20 mg of lasix and drinking 6-8 bottles of water a day to keep her skin clear.   Admits mild shortness of breathe walking short distances.   Next Cardiology appt 7/26     Review of Systems   Constitutional:  Positive for malaise/fatigue.   Eyes:  Negative for blurred vision.   Respiratory:  Positive for shortness of breath.    Cardiovascular:  Positive for leg swelling. Negative for chest pain, palpitations, orthopnea and PND.   Musculoskeletal:  Negative for neck pain.   Neurological:  Positive for headaches.       History:  Past Medical History:   Diagnosis Date    Cataract     Bilateral    Chronic diastolic congestive heart failure 08/25/2020    Dizziness 03/12/2023    Encounter for blood transfusion     History of repair of aneurysm of abdominal aorta using endovascular stent graft     DR Bonds    Hx of psychiatric care     Hypertension     Major depressive disorder, single episode, moderate with anxious distress 01/14/2020    Malignant neoplasm of upper-outer quadrant of right breast in female, estrogen receptor positive 03/14/2019    radiation     Psychiatric problem     Sleep apnea     Sleep difficulties     Stroke 2009    no residual defect    Therapy      Past Surgical History:   Procedure Laterality Date    APPENDECTOMY      AXILLARY NODE DISSECTION Right 2020    Procedure: LYMPHADENECTOMY, AXILLARY;  Surgeon: Artemio Starks MD;  Location: [facility];  Service: General;  Laterality: Right;    BIOPSY OF AXILLARY NODE Right 2021    Procedure: BIOPSY, LYMPH NODE, AXILLARY;  Surgeon: Artemio Sutton MD;  Location: [address];  Service: General;  Laterality: Right;    BREAST BIOPSY          BREAST LUMPECTOMY      2019     SECTION      x 1    OOPHORECTOMY      right     SENTINEL LYMPH NODE BIOPSY Right 2019    Procedure: BIOPSY, LYMPH NODE, SENTINEL;  Surgeon: Artemio Starks MD;  Location: [facility];  Service: General;  Laterality: Right;    splenic artery aneurysm repair      TONSILLECTOMY         Family History   Problem Relation Age of Onset    Diabetes Maternal Grandmother     Diabetes Maternal Grandfather     Diabetes Mother     Hypertension Mother     Sickle cell anemia Daughter     Breast cancer Neg Hx     Colon cancer Neg Hx     Ovarian cancer Neg Hx      Social History     Socioeconomic History    Marital status:      Spouse name: Campos Luther    Number of children: 2   Tobacco Use    Smoking status: Never     Passive exposure: Past    Smokeless tobacco: Never   Substance and Sexual Activity    Alcohol use: No    Drug use: No    Sexual activity: Yes     Partners: Male     Birth control/protection: Post-menopausal     Patient Active Problem List   Diagnosis    Shortness of breath on exertion    NILS (obstructive sleep apnea)    Malignant neoplasm of upper-outer quadrant of right breast in female, estrogen receptor positive    Hypertension    Morbid obesity with BMI of 45.0-49.9, adult    Headache    Circadian rhythm sleep disorder    Nocturnal hypoxemia    Major depressive disorder, single episode,  moderate with anxious distress    Speech disturbance    Blurry vision    Psychological factors affecting medical condition    SOB (shortness of breath)    Lymphadenopathy    Porcelain gallbladder    Axillary mass    Splenic artery aneurysm    Chronic kidney disease, stage 3a    Other chest pain    Acute pulmonary embolism    Primary osteoarthritis of right hip    History of pulmonary embolism    Multiple thyroid nodules    History of prediabetes    Right sided weakness    Neuropathic pain    Neoplasm related pain    Chronic diastolic congestive heart failure    Bradycardia    Neurological complaint    Orthostatic hypotension     Review of patient's allergies indicates:   Allergen Reactions    Pcn [penicillins] Hives       The following were reviewed at this visit: active problem list, medication list, allergies, family history, social history, and health maintenance.    Medications:  Current Outpatient Medications on File Prior to Visit   Medication Sig Dispense Refill    ALPRAZolam (XANAX) 0.5 MG tablet Take 1 tablet (0.5 mg total) by mouth 2 (two) times daily as needed for Anxiety. 180 tablet 0    amLODIPine (NORVASC) 10 MG tablet Take 1 tablet (10 mg total) by mouth once daily. 90 tablet 3    apixaban (ELIQUIS) 5 mg Tab Take 1 tablet (5 mg total) by mouth 2 (two) times daily. 180 tablet 1    atorvastatin (LIPITOR) 20 MG tablet Take 1 tablet (20 mg total) by mouth once daily. 90 tablet 3    busPIRone (BUSPAR) 15 MG tablet Take 1 tablet (15 mg total) by mouth 3 (three) times daily as needed (anxiety). 270 tablet 1    butalbital-acetaminophen-caffeine -40 mg (FIORICET, ESGIC) -40 mg per tablet TAKE 1 TABLET DAILY AS NEEDED FOR PAIN OR HEADACHES. 30 tablet 1    butalbital-acetaminophen-caffeine -40 mg (FIORICET, ESGIC) -40 mg per tablet TAKE 1 TABLET DAILY AS NEEDED FOR PAIN OR HEADACHES. 30 tablet 1    cholecalciferol, vitamin D3, 1,250 mcg (50,000 unit) capsule Take 1 capsule (50,000 Units  total) by mouth once a week. 12 capsule 0    cloNIDine (CATAPRES) 0.1 MG tablet Take 2 tablets (0.2 mg total) by mouth 2 (two) times daily as needed (BP>170/95). 80 tablet 1    diclofenac sodium (VOLTAREN) 1 % Gel Apply 2 grams topically once daily. 400 g 1    DULoxetine (CYMBALTA) 30 MG capsule Take 1 capsule (30 mg total) by mouth once daily. 90 capsule 3    furosemide (LASIX) 40 MG tablet Take 1 tablet (40 mg total) by mouth 2 (two) times daily. 60 tablet 11    letrozole (FEMARA) 2.5 mg Tab Take 1 tablet (2.5 mg total) by mouth once daily. 90 tablet 1    levocetirizine (XYZAL) 5 MG tablet Take 1 tablet (5 mg total) by mouth every evening. 90 tablet 0    LIDOcaine (LIDODERM) 5 % Place 2 patches onto the skin once daily. Remove & Discard patch within 12 hours or as directed by MD 90 patch 1    multivitamin (THERAGRAN) per tablet Take 1 tablet by mouth once daily.      oxybutynin (DITROPAN) 5 MG Tab Take 1 tablet (5 mg total) by mouth once daily. 90 tablet 3    oxyCODONE-acetaminophen (PERCOCET) 7.5-325 mg per tablet Take 1 tablet by mouth every 6 (six) hours as needed for Pain. 60 tablet 0    pregabalin (LYRICA) 100 MG capsule Take 1 capsule (100 mg total) by mouth 2 (two) times daily. 60 capsule 0    ranolazine (RANEXA) 1,000 mg Tb12 Take 1 tablet (1,000 mg total) by mouth 2 (two) times daily. 180 tablet 3    sacubitriL-valsartan (ENTRESTO) 49-51 mg per tablet Take 1 tablet by mouth 2 (two) times daily. 180 tablet 3    tiZANidine (ZANAFLEX) 4 MG tablet Take 1 tablet (4 mg total) by mouth every 6 (six) hours as needed (muscle pain). 40 tablet 2    zolpidem (AMBIEN) 5 MG Tab Take 1 tablet (5 mg total) by mouth nightly as needed (sleep). 90 tablet 1     No current facility-administered medications on file prior to visit.       Exam:  Wt Readings from Last 3 Encounters:   07/03/23 136 kg (299 lb 13.2 oz)   06/05/23 (!) 138.5 kg (305 lb 5.4 oz)   05/08/23 (!) 138.9 kg (306 lb 3.5 oz)     Temp Readings from Last 3  Encounters:   07/03/23 98.6 °F (37 °C) (Tympanic)   06/05/23 97.5 °F (36.4 °C) (Tympanic)   05/08/23 97.1 °F (36.2 °C)     BP Readings from Last 3 Encounters:   07/03/23 130/76   06/05/23 138/88   05/08/23 (!) 162/98     Pulse Readings from Last 3 Encounters:   07/03/23 97   06/05/23 85   05/08/23 62     There is no height or weight on file to calculate BMI.      @ROS@    Physical Exam  Vitals and nursing note reviewed.   Constitutional:       Appearance: Normal appearance.   Eyes:      Conjunctiva/sclera: Conjunctivae normal.   Pulmonary:      Effort: Pulmonary effort is normal.   Musculoskeletal:      Right lower leg: 3+ Edema present.      Left lower leg: 3+ Edema present.   Neurological:      General: No focal deficit present.      Mental Status: She is alert and oriented to person, place, and time.   Psychiatric:         Mood and Affect: Mood normal. Affect is tearful.         Behavior: Behavior normal.         Thought Content: Thought content normal.         Judgment: Judgment normal.       Laboratory Reviewed ({No)  Lab Results   Component Value Date    WBC 8.65 03/20/2023    HGB 11.8 (L) 03/20/2023    HCT 38.0 03/20/2023     03/20/2023    CHOL 234 (H) 02/28/2023    TRIG 191 (H) 02/28/2023    HDL 44 02/28/2023    ALT 12 03/11/2023    ALT 12 03/11/2023    AST 12 03/11/2023    AST 12 03/11/2023     03/20/2023    K 4.4 03/20/2023     03/20/2023    CREATININE 1.2 03/20/2023    BUN 16 03/20/2023    CO2 30 (H) 03/20/2023    TSH 1.691 03/10/2023    INR 1.1 03/11/2023    HGBA1C 5.9 (H) 05/18/2019   Daily weight in the morning     Lasix increased to 40 mg bid   Keep appt with cardiology   Keep legs elevated   Order placed for compression stockings     Pt was advised to go to the ER for evaluation if symptoms worsen

## 2023-07-11 ENCOUNTER — PATIENT MESSAGE (OUTPATIENT)
Dept: FAMILY MEDICINE | Facility: CLINIC | Age: 73
End: 2023-07-11
Payer: MEDICARE

## 2023-07-11 ENCOUNTER — TELEPHONE (OUTPATIENT)
Dept: FAMILY MEDICINE | Facility: CLINIC | Age: 73
End: 2023-07-11
Payer: MEDICARE

## 2023-07-11 ENCOUNTER — HOSPITAL ENCOUNTER (OUTPATIENT)
Facility: HOSPITAL | Age: 73
Discharge: HOME HEALTH | End: 2023-07-14
Attending: EMERGENCY MEDICINE | Admitting: INTERNAL MEDICINE
Payer: MEDICARE

## 2023-07-11 DIAGNOSIS — R53.1 WEAKNESS: ICD-10-CM

## 2023-07-11 DIAGNOSIS — R07.9 CHEST PAIN: ICD-10-CM

## 2023-07-11 DIAGNOSIS — R06.02 SHORTNESS OF BREATH ON EXERTION: ICD-10-CM

## 2023-07-11 DIAGNOSIS — R00.1 BRADYCARDIA: Primary | ICD-10-CM

## 2023-07-11 DIAGNOSIS — N30.00 ACUTE CYSTITIS WITHOUT HEMATURIA: ICD-10-CM

## 2023-07-11 PROBLEM — F33.1 MAJOR DEPRESSIVE DISORDER, RECURRENT EPISODE, MODERATE WITH ANXIOUS DISTRESS: Status: ACTIVE | Noted: 2020-01-14

## 2023-07-11 PROBLEM — E66.813 CLASS 3 SEVERE OBESITY IN ADULT: Chronic | Status: ACTIVE | Noted: 2019-06-28

## 2023-07-11 PROBLEM — N39.0 UTI (URINARY TRACT INFECTION): Status: ACTIVE | Noted: 2023-07-11

## 2023-07-11 PROBLEM — E66.01 CLASS 3 SEVERE OBESITY IN ADULT: Chronic | Status: ACTIVE | Noted: 2019-06-28

## 2023-07-11 PROBLEM — R29.6 FREQUENT FALLS: Status: ACTIVE | Noted: 2023-07-11

## 2023-07-11 PROBLEM — M25.511 RIGHT SHOULDER PAIN: Status: ACTIVE | Noted: 2023-07-11

## 2023-07-11 LAB
ALBUMIN SERPL BCP-MCNC: 3.1 G/DL (ref 3.5–5.2)
ALLENS TEST: ABNORMAL
ALP SERPL-CCNC: 87 U/L (ref 55–135)
ALT SERPL W/O P-5'-P-CCNC: 9 U/L (ref 10–44)
AMPHET+METHAMPHET UR QL: NEGATIVE
ANION GAP SERPL CALC-SCNC: 9 MMOL/L (ref 8–16)
AST SERPL-CCNC: 12 U/L (ref 10–40)
BACTERIA #/AREA URNS HPF: NORMAL /HPF
BARBITURATES UR QL SCN>200 NG/ML: ABNORMAL
BASOPHILS # BLD AUTO: 0.04 K/UL (ref 0–0.2)
BASOPHILS NFR BLD: 0.6 % (ref 0–1.9)
BENZODIAZ UR QL SCN>200 NG/ML: ABNORMAL
BILIRUB SERPL-MCNC: 0.2 MG/DL (ref 0.1–1)
BILIRUB UR QL STRIP: NEGATIVE
BNP SERPL-MCNC: 104 PG/ML (ref 0–99)
BUN SERPL-MCNC: 28 MG/DL (ref 8–23)
BZE UR QL SCN: NEGATIVE
CALCIUM SERPL-MCNC: 8.9 MG/DL (ref 8.7–10.5)
CANNABINOIDS UR QL SCN: NEGATIVE
CHLORIDE SERPL-SCNC: 111 MMOL/L (ref 95–110)
CK SERPL-CCNC: 48 U/L (ref 20–180)
CLARITY UR: CLEAR
CO2 SERPL-SCNC: 24 MMOL/L (ref 23–29)
COLOR UR: YELLOW
CREAT SERPL-MCNC: 1.5 MG/DL (ref 0.5–1.4)
CREAT UR-MCNC: 33.1 MG/DL (ref 15–325)
DELSYS: ABNORMAL
DIFFERENTIAL METHOD: ABNORMAL
EOSINOPHIL # BLD AUTO: 0.1 K/UL (ref 0–0.5)
EOSINOPHIL NFR BLD: 2 % (ref 0–8)
ERYTHROCYTE [DISTWIDTH] IN BLOOD BY AUTOMATED COUNT: 15.1 % (ref 11.5–14.5)
EST. GFR  (NO RACE VARIABLE): 37 ML/MIN/1.73 M^2
GLUCOSE SERPL-MCNC: 101 MG/DL (ref 70–110)
GLUCOSE UR QL STRIP: NEGATIVE
HCO3 UR-SCNC: 23.1 MMOL/L (ref 24–28)
HCT VFR BLD AUTO: 32.7 % (ref 37–48.5)
HGB BLD-MCNC: 10.1 G/DL (ref 12–16)
HGB UR QL STRIP: NEGATIVE
IMM GRANULOCYTES # BLD AUTO: 0.02 K/UL (ref 0–0.04)
IMM GRANULOCYTES NFR BLD AUTO: 0.3 % (ref 0–0.5)
KETONES UR QL STRIP: NEGATIVE
LEUKOCYTE ESTERASE UR QL STRIP: ABNORMAL
LYMPHOCYTES # BLD AUTO: 2.2 K/UL (ref 1–4.8)
LYMPHOCYTES NFR BLD: 31.2 % (ref 18–48)
MCH RBC QN AUTO: 25.4 PG (ref 27–31)
MCHC RBC AUTO-ENTMCNC: 30.9 G/DL (ref 32–36)
MCV RBC AUTO: 82 FL (ref 82–98)
METHADONE UR QL SCN>300 NG/ML: NEGATIVE
MICROSCOPIC COMMENT: NORMAL
MODE: ABNORMAL
MONOCYTES # BLD AUTO: 0.6 K/UL (ref 0.3–1)
MONOCYTES NFR BLD: 8.9 % (ref 4–15)
NEUTROPHILS # BLD AUTO: 3.9 K/UL (ref 1.8–7.7)
NEUTROPHILS NFR BLD: 57 % (ref 38–73)
NITRITE UR QL STRIP: POSITIVE
NRBC BLD-RTO: 0 /100 WBC
OPIATES UR QL SCN: NEGATIVE
PCO2 BLDA: 39.3 MMHG (ref 35–45)
PCP UR QL SCN>25 NG/ML: NEGATIVE
PH SMN: 7.38 [PH] (ref 7.35–7.45)
PH UR STRIP: 7 [PH] (ref 5–8)
PLATELET # BLD AUTO: 218 K/UL (ref 150–450)
PMV BLD AUTO: 9.3 FL (ref 9.2–12.9)
PO2 BLDA: 106 MMHG (ref 80–100)
POC BE: -2 MMOL/L
POC SATURATED O2: 98 % (ref 95–100)
POTASSIUM SERPL-SCNC: 4.2 MMOL/L (ref 3.5–5.1)
PROT SERPL-MCNC: 6.5 G/DL (ref 6–8.4)
PROT UR QL STRIP: NEGATIVE
RBC # BLD AUTO: 3.97 M/UL (ref 4–5.4)
SAMPLE: ABNORMAL
SITE: ABNORMAL
SODIUM SERPL-SCNC: 144 MMOL/L (ref 136–145)
SP GR UR STRIP: 1.01 (ref 1–1.03)
TOXICOLOGY INFORMATION: ABNORMAL
TROPONIN I SERPL DL<=0.01 NG/ML-MCNC: 0.01 NG/ML (ref 0–0.03)
TROPONIN I SERPL DL<=0.01 NG/ML-MCNC: 0.02 NG/ML (ref 0–0.03)
URN SPEC COLLECT METH UR: ABNORMAL
UROBILINOGEN UR STRIP-ACNC: NEGATIVE EU/DL
WBC # BLD AUTO: 6.89 K/UL (ref 3.9–12.7)
WBC #/AREA URNS HPF: 1 /HPF (ref 0–5)

## 2023-07-11 PROCEDURE — 85025 COMPLETE CBC W/AUTO DIFF WBC: CPT | Mod: HCNC | Performed by: EMERGENCY MEDICINE

## 2023-07-11 PROCEDURE — G0378 HOSPITAL OBSERVATION PER HR: HCPCS | Mod: HCNC

## 2023-07-11 PROCEDURE — 87086 URINE CULTURE/COLONY COUNT: CPT | Mod: HCNC | Performed by: EMERGENCY MEDICINE

## 2023-07-11 PROCEDURE — 87186 SC STD MICRODIL/AGAR DIL: CPT | Mod: HCNC | Performed by: EMERGENCY MEDICINE

## 2023-07-11 PROCEDURE — 82803 BLOOD GASES ANY COMBINATION: CPT | Mod: HCNC

## 2023-07-11 PROCEDURE — 84484 ASSAY OF TROPONIN QUANT: CPT | Mod: 91,HCNC | Performed by: INTERNAL MEDICINE

## 2023-07-11 PROCEDURE — 80053 COMPREHEN METABOLIC PANEL: CPT | Mod: HCNC | Performed by: EMERGENCY MEDICINE

## 2023-07-11 PROCEDURE — 87088 URINE BACTERIA CULTURE: CPT | Mod: HCNC | Performed by: EMERGENCY MEDICINE

## 2023-07-11 PROCEDURE — 84484 ASSAY OF TROPONIN QUANT: CPT | Mod: HCNC | Performed by: EMERGENCY MEDICINE

## 2023-07-11 PROCEDURE — 63600175 PHARM REV CODE 636 W HCPCS: Mod: HCNC | Performed by: INTERNAL MEDICINE

## 2023-07-11 PROCEDURE — 36600 WITHDRAWAL OF ARTERIAL BLOOD: CPT | Mod: HCNC

## 2023-07-11 PROCEDURE — 96374 THER/PROPH/DIAG INJ IV PUSH: CPT | Mod: 59,HCNC

## 2023-07-11 PROCEDURE — 51701 INSERT BLADDER CATHETER: CPT | Mod: HCNC

## 2023-07-11 PROCEDURE — 93005 ELECTROCARDIOGRAM TRACING: CPT | Mod: HCNC

## 2023-07-11 PROCEDURE — 51798 US URINE CAPACITY MEASURE: CPT | Mod: HCNC

## 2023-07-11 PROCEDURE — 82550 ASSAY OF CK (CPK): CPT | Mod: HCNC | Performed by: EMERGENCY MEDICINE

## 2023-07-11 PROCEDURE — 99900035 HC TECH TIME PER 15 MIN (STAT): Mod: HCNC

## 2023-07-11 PROCEDURE — 93010 ELECTROCARDIOGRAM REPORT: CPT | Mod: HCNC,,, | Performed by: STUDENT IN AN ORGANIZED HEALTH CARE EDUCATION/TRAINING PROGRAM

## 2023-07-11 PROCEDURE — 87077 CULTURE AEROBIC IDENTIFY: CPT | Mod: HCNC | Performed by: EMERGENCY MEDICINE

## 2023-07-11 PROCEDURE — 99291 CRITICAL CARE FIRST HOUR: CPT | Mod: HCNC

## 2023-07-11 PROCEDURE — 25000003 PHARM REV CODE 250: Mod: HCNC | Performed by: INTERNAL MEDICINE

## 2023-07-11 PROCEDURE — 96361 HYDRATE IV INFUSION ADD-ON: CPT | Mod: 59,HCNC

## 2023-07-11 PROCEDURE — 63600175 PHARM REV CODE 636 W HCPCS: Mod: HCNC | Performed by: EMERGENCY MEDICINE

## 2023-07-11 PROCEDURE — 80307 DRUG TEST PRSMV CHEM ANLYZR: CPT | Mod: HCNC | Performed by: EMERGENCY MEDICINE

## 2023-07-11 PROCEDURE — 93010 EKG 12-LEAD: ICD-10-PCS | Mod: HCNC,,, | Performed by: STUDENT IN AN ORGANIZED HEALTH CARE EDUCATION/TRAINING PROGRAM

## 2023-07-11 PROCEDURE — 81000 URINALYSIS NONAUTO W/SCOPE: CPT | Mod: HCNC,59 | Performed by: EMERGENCY MEDICINE

## 2023-07-11 PROCEDURE — 83880 ASSAY OF NATRIURETIC PEPTIDE: CPT | Mod: HCNC | Performed by: EMERGENCY MEDICINE

## 2023-07-11 PROCEDURE — 25000003 PHARM REV CODE 250: Mod: HCNC | Performed by: EMERGENCY MEDICINE

## 2023-07-11 RX ORDER — NALOXONE HCL 0.4 MG/ML
0.02 VIAL (ML) INJECTION
Status: DISCONTINUED | OUTPATIENT
Start: 2023-07-11 | End: 2023-07-14 | Stop reason: HOSPADM

## 2023-07-11 RX ORDER — BISACODYL 10 MG
10 SUPPOSITORY, RECTAL RECTAL DAILY PRN
Status: DISCONTINUED | OUTPATIENT
Start: 2023-07-11 | End: 2023-07-14 | Stop reason: HOSPADM

## 2023-07-11 RX ORDER — ATORVASTATIN CALCIUM 10 MG/1
20 TABLET, FILM COATED ORAL DAILY
Status: DISCONTINUED | OUTPATIENT
Start: 2023-07-12 | End: 2023-07-14 | Stop reason: HOSPADM

## 2023-07-11 RX ORDER — LANOLIN ALCOHOL/MO/W.PET/CERES
800 CREAM (GRAM) TOPICAL
Status: DISCONTINUED | OUTPATIENT
Start: 2023-07-11 | End: 2023-07-14 | Stop reason: HOSPADM

## 2023-07-11 RX ORDER — DULOXETIN HYDROCHLORIDE 30 MG/1
30 CAPSULE, DELAYED RELEASE ORAL DAILY
Status: DISCONTINUED | OUTPATIENT
Start: 2023-07-12 | End: 2023-07-13

## 2023-07-11 RX ORDER — ACETAMINOPHEN 325 MG/1
650 TABLET ORAL EVERY 4 HOURS PRN
Status: DISCONTINUED | OUTPATIENT
Start: 2023-07-11 | End: 2023-07-13

## 2023-07-11 RX ORDER — SODIUM,POTASSIUM PHOSPHATES 280-250MG
2 POWDER IN PACKET (EA) ORAL
Status: DISCONTINUED | OUTPATIENT
Start: 2023-07-11 | End: 2023-07-14 | Stop reason: HOSPADM

## 2023-07-11 RX ORDER — SIMETHICONE 80 MG
1 TABLET,CHEWABLE ORAL 4 TIMES DAILY PRN
Status: DISCONTINUED | OUTPATIENT
Start: 2023-07-11 | End: 2023-07-14 | Stop reason: HOSPADM

## 2023-07-11 RX ORDER — LORAZEPAM 2 MG/ML
0.5 INJECTION INTRAMUSCULAR ONCE
Status: COMPLETED | OUTPATIENT
Start: 2023-07-11 | End: 2023-07-11

## 2023-07-11 RX ORDER — ALBUTEROL SULFATE 0.83 MG/ML
2.5 SOLUTION RESPIRATORY (INHALATION) EVERY 4 HOURS PRN
Status: DISCONTINUED | OUTPATIENT
Start: 2023-07-11 | End: 2023-07-14 | Stop reason: HOSPADM

## 2023-07-11 RX ORDER — ONDANSETRON 2 MG/ML
4 INJECTION INTRAMUSCULAR; INTRAVENOUS EVERY 8 HOURS PRN
Status: DISCONTINUED | OUTPATIENT
Start: 2023-07-11 | End: 2023-07-14 | Stop reason: HOSPADM

## 2023-07-11 RX ORDER — SODIUM CHLORIDE 0.9 % (FLUSH) 0.9 %
10 SYRINGE (ML) INJECTION EVERY 12 HOURS PRN
Status: DISCONTINUED | OUTPATIENT
Start: 2023-07-11 | End: 2023-07-14 | Stop reason: HOSPADM

## 2023-07-11 RX ORDER — TALC
6 POWDER (GRAM) TOPICAL NIGHTLY PRN
Status: DISCONTINUED | OUTPATIENT
Start: 2023-07-11 | End: 2023-07-14 | Stop reason: HOSPADM

## 2023-07-11 RX ORDER — METHOCARBAMOL 500 MG/1
500 TABLET, FILM COATED ORAL 3 TIMES DAILY PRN
Status: DISCONTINUED | OUTPATIENT
Start: 2023-07-11 | End: 2023-07-14 | Stop reason: HOSPADM

## 2023-07-11 RX ORDER — AMLODIPINE BESYLATE 10 MG/1
10 TABLET ORAL DAILY
Status: DISCONTINUED | OUTPATIENT
Start: 2023-07-12 | End: 2023-07-14 | Stop reason: HOSPADM

## 2023-07-11 RX ORDER — RANOLAZINE 500 MG/1
1000 TABLET, EXTENDED RELEASE ORAL 2 TIMES DAILY
Status: DISCONTINUED | OUTPATIENT
Start: 2023-07-11 | End: 2023-07-12

## 2023-07-11 RX ORDER — MAG HYDROX/ALUMINUM HYD/SIMETH 200-200-20
30 SUSPENSION, ORAL (FINAL DOSE FORM) ORAL 4 TIMES DAILY PRN
Status: DISCONTINUED | OUTPATIENT
Start: 2023-07-11 | End: 2023-07-14 | Stop reason: HOSPADM

## 2023-07-11 RX ORDER — ALPRAZOLAM 0.5 MG/1
0.5 TABLET ORAL 2 TIMES DAILY PRN
Status: DISCONTINUED | OUTPATIENT
Start: 2023-07-11 | End: 2023-07-14 | Stop reason: HOSPADM

## 2023-07-11 RX ORDER — ONDANSETRON 8 MG/1
8 TABLET, ORALLY DISINTEGRATING ORAL EVERY 8 HOURS PRN
Status: DISCONTINUED | OUTPATIENT
Start: 2023-07-11 | End: 2023-07-14 | Stop reason: HOSPADM

## 2023-07-11 RX ORDER — NALOXONE HCL 0.4 MG/ML
2 VIAL (ML) INJECTION
Status: COMPLETED | OUTPATIENT
Start: 2023-07-11 | End: 2023-07-11

## 2023-07-11 RX ORDER — POLYETHYLENE GLYCOL 3350 17 G/17G
17 POWDER, FOR SOLUTION ORAL DAILY
Status: DISCONTINUED | OUTPATIENT
Start: 2023-07-11 | End: 2023-07-14 | Stop reason: HOSPADM

## 2023-07-11 RX ADMIN — NALXONE HYDROCHLORIDE 2 MG: 0.4 INJECTION INTRAMUSCULAR; INTRAVENOUS; SUBCUTANEOUS at 01:07

## 2023-07-11 RX ADMIN — BUSPIRONE HYDROCHLORIDE 15 MG: 10 TABLET ORAL at 08:07

## 2023-07-11 RX ADMIN — SODIUM CHLORIDE 1000 ML: 9 INJECTION, SOLUTION INTRAVENOUS at 01:07

## 2023-07-11 RX ADMIN — METHOCARBAMOL 500 MG: 500 TABLET ORAL at 05:07

## 2023-07-11 RX ADMIN — RANOLAZINE 1000 MG: 500 TABLET, EXTENDED RELEASE ORAL at 08:07

## 2023-07-11 RX ADMIN — LORAZEPAM 0.5 MG: 2 INJECTION INTRAMUSCULAR; INTRAVENOUS at 05:07

## 2023-07-11 NOTE — ASSESSMENT & PLAN NOTE
Patient reports frequent falls recently including fall onto her right shoulder last Saturday   Right shoulder x-ray showed no evidence of fracture or dislocation  Robaxin p.r.n.

## 2023-07-11 NOTE — ASSESSMENT & PLAN NOTE
Body mass index is 47.4 kg/m². Morbid obesity complicates all aspects of disease management from diagnostic modalities to treatment. Weight loss encouraged and health benefits explained to patient.

## 2023-07-11 NOTE — ASSESSMENT & PLAN NOTE
Renal function appears to be slightly worse above baseline  Strict I&Os  Avoid nephrotoxins and renal dose meds as needed  Monitor renal function

## 2023-07-11 NOTE — ASSESSMENT & PLAN NOTE
PT/OT evaluation  Concern that polypharmacy is contributory to patient's weakness and frequent falls as she is on Ambien, Percocet, Xanax, Fioricet outpatient  Minimize sedating meds

## 2023-07-11 NOTE — ASSESSMENT & PLAN NOTE
Reports that she is now using CPAP currently after her device was recalled  Monitor respiratory status  Nocturnal O2 as needed

## 2023-07-11 NOTE — TELEPHONE ENCOUNTER
----- Message from Vannessa Shelton sent at 7/11/2023  8:53 AM CDT -----  Contact: Susu  Type:  Needs Medical Advice    Who Called: Susu  Symptoms (please be specific): Swelling in feet and legs  How long has patient had these symptoms: Approximately 3 weeks   Pharmacy name and phone #:    [facility]  [place] - [address]  [address]  [place] [address]  Phone: [phone] Fax: [phone]    In case of emergency:   [facility]  [address] [place], [place]  Phone: [phone]  Fax: [phone]  Would the patient rather a call back or a response via [facility]? call  Best Call Back Number: [phone] or [phone]   Additional Information: Patient request to speak with nurse regarding symptoms. Please give patient a call back to assist.   Thank you,  GH

## 2023-07-11 NOTE — ASSESSMENT & PLAN NOTE
Patient noted to have urinary retention in the ED with bladder scan showing > 690 mL , straight cath ordered  Per chart review, she had urinary retention during her last hospital admission   Serial bladder scan ordered  May need to have Puckett placed  Stop oxybutynin  May need to be started on Flomax if she continues to have retention

## 2023-07-11 NOTE — PHARMACY MED REC
"Admission Medication History     The home medication history was taken by Jairon Payan.    You may go to "Admission" then "Reconcile Home Medications" tabs to review and/or act upon these items.     The home medication list has been updated by the Pharmacy department.   Please read ALL comments highlighted in yellow.   Please address this information as you see fit.    Feel free to contact us if you have any questions or require assistance.        Medications listed below were obtained from: Analytic software- [employer] and Medical records      Jairon Payan  [id number]    Current Outpatient Medications on File Prior to Encounter   Medication Sig Dispense Refill Last Dose    amLODIPine (NORVASC) 10 MG tablet Take 1 tablet (10 mg total) by mouth once daily. 90 tablet 3 7/11/2023    apixaban (ELIQUIS) 5 mg Tab Take 1 tablet (5 mg total) by mouth 2 (two) times daily. 180 tablet 1 7/11/2023    atorvastatin (LIPITOR) 20 MG tablet Take 1 tablet (20 mg total) by mouth once daily. 90 tablet 3 7/11/2023    busPIRone (BUSPAR) 15 MG tablet Take 1 tablet (15 mg total) by mouth 3 (three) times daily as needed (anxiety). 270 tablet 1 7/11/2023    butalbital-acetaminophen-caffeine -40 mg (FIORICET, ESGIC) -40 mg per tablet TAKE 1 TABLET DAILY AS NEEDED FOR PAIN OR HEADACHES. 30 tablet 1 Past Week    pregabalin (LYRICA) 100 MG capsule Take 1 capsule (100 mg total) by mouth 2 (two) times daily. 60 capsule 0 7/10/2023    ranolazine (RANEXA) 1,000 mg Tb12 Take 1 tablet (1,000 mg total) by mouth 2 (two) times daily. 180 tablet 3 7/11/2023    sacubitriL-valsartan (ENTRESTO) 49-51 mg per tablet Take 1 tablet by mouth 2 (two) times daily. 180 tablet 3 7/11/2023    tiZANidine (ZANAFLEX) 4 MG tablet Take 1 tablet (4 mg total) by mouth every 6 (six) hours as needed (muscle pain). 40 tablet 2 7/11/2023    zolpidem (AMBIEN) 5 MG Tab Take 1 tablet (5 mg total) by mouth nightly as needed (sleep). 90 tablet 1 Past Week    " ALPRAZolam (XANAX) 0.5 MG tablet Take 1 tablet (0.5 mg total) by mouth 2 (two) times daily as needed for Anxiety. 180 tablet 0     butalbital-acetaminophen-caffeine -40 mg (FIORICET, ESGIC) -40 mg per tablet TAKE 1 TABLET DAILY AS NEEDED FOR PAIN OR HEADACHES. 30 tablet 1     cholecalciferol, vitamin D3, 1,250 mcg (50,000 unit) capsule Take 1 capsule (50,000 Units total) by mouth once a week. 12 capsule 0     cloNIDine (CATAPRES) 0.1 MG tablet Take 2 tablets (0.2 mg total) by mouth 2 (two) times daily as needed (BP>170/95). 80 tablet 1 Unknown    diclofenac sodium (VOLTAREN) 1 % Gel Apply 2 grams topically once daily. 400 g 1     DULoxetine (CYMBALTA) 30 MG capsule Take 1 capsule (30 mg total) by mouth once daily. 90 capsule 3     furosemide (LASIX) 40 MG tablet Take 1 tablet (40 mg total) by mouth 2 (two) times daily. 60 tablet 11     letrozole (FEMARA) 2.5 mg Tab Take 1 tablet (2.5 mg total) by mouth once daily. 90 tablet 1     levocetirizine (XYZAL) 5 MG tablet Take 1 tablet (5 mg total) by mouth every evening. 90 tablet 0     LIDOcaine (LIDODERM) 5 % Place 2 patches onto the skin once daily. Remove & Discard patch within 12 hours or as directed by MD Conner patch 1     multivitamin (THERAGRAN) per tablet Take 1 tablet by mouth once daily.       oxybutynin (DITROPAN) 5 MG Tab Take 1 tablet (5 mg total) by mouth once daily. 90 tablet 3     oxyCODONE-acetaminophen (PERCOCET) 7.5-325 mg per tablet Take 1 tablet by mouth every 6 (six) hours as needed for Pain. 60 tablet 0                            .

## 2023-07-11 NOTE — ASSESSMENT & PLAN NOTE
Patient was noted to have bradycardia with heart rate in the 40s on arrival  She is not on any beta-blocker or any other meds that would cause AV jena blockage  Cardiac monitoring  Cardiology consulted  Etiology unclear

## 2023-07-11 NOTE — HPI
Susu Luther is a 73 y.o. female with Chronic diastolic congestive heart failure, History of repair of aneurysm of abdominal aorta using endovascular stent graft, psychiatric care, Hypertension, Major depressive disorder, Malignant neoplasm of upper-outer quadrant of right breast in female, estrogen receptor positive, Sleep apnea, Stroke  who presented to ED on 7/11/2023 with weakness.  Patient reports that she was feeling extremely short of breath this morning, subsequently a friend called EMS.  Additionally she has been having a lot of falls at home including last Saturday.  Reports that she fell onto her right shoulder and since then has been having a lot of pain on her arm and shoulder.  Additionally has been having worsening swelling of her legs making it difficult and painful for her to walk.  States that she had recently been doubling up on her home Lasix dose per the advice of Cardiology.  Has not had any chest pain.  Admits to ongoing depression and anxiety which has not been well-controlled.  Per EMS report, patient was found to be bradycardic and hypotensive when they arrived.  Subsequently she was given IV fluid and Narcan en route.  On arrival at ED noted to have /67, heart rate 45.  Patient placed in observation on hospital medicine service for further management.  Cardiology consulted.    Shortly after patient was admitted, she developed acute onset of severe chest pain.  EKG showed sinus bradycardia, with no significant change compared to EKG obtained earlier in the day.  On reexamination, chest pain reproducible was tearful and appears significantly more anxious than she has been previously.    PCP: Alexus Casas NP    SDM:  Daughter, Darío Rodriguez

## 2023-07-11 NOTE — ASSESSMENT & PLAN NOTE
UA consistent with UTI with positive nitrite, trace leukocyte  Urine culture in process  Started on ceftriaxone for empiric treatment

## 2023-07-11 NOTE — SUBJECTIVE & OBJECTIVE
Past Medical History:   Diagnosis Date    Cataract     Bilateral    Chronic diastolic congestive heart failure 2020    Dizziness 2023    Encounter for blood transfusion     History of repair of aneurysm of abdominal aorta using endovascular stent graft     DR Bonds    Hx of psychiatric care     Hypertension     Major depressive disorder, single episode, moderate with anxious distress 2020    Malignant neoplasm of upper-outer quadrant of right breast in female, estrogen receptor positive 2019    radiation    Psychiatric problem     Sleep apnea     Sleep difficulties     Stroke 2009    no residual defect    Therapy        Past Surgical History:   Procedure Laterality Date    APPENDECTOMY      AXILLARY NODE DISSECTION Right 2020    Procedure: LYMPHADENECTOMY, AXILLARY;  Surgeon: Artemio Starks MD;  Location: [facility];  Service: General;  Laterality: Right;    BIOPSY OF AXILLARY NODE Right 2021    Procedure: BIOPSY, LYMPH NODE, AXILLARY;  Surgeon: Artemio Sutton MD;  Location: [address];  Service: General;  Laterality: Right;    BREAST BIOPSY      2019    BREAST LUMPECTOMY      2019     SECTION      x 1    OOPHORECTOMY      right     SENTINEL LYMPH NODE BIOPSY Right 2019    Procedure: BIOPSY, LYMPH NODE, SENTINEL;  Surgeon: Artemio Starks MD;  Location: [facility];  Service: General;  Laterality: Right;    splenic artery aneurysm repair      TONSILLECTOMY         Review of patient's allergies indicates:   Allergen Reactions    Pcn [penicillins] Hives       No current facility-administered medications on file prior to encounter.     Current Outpatient Medications on File Prior to Encounter   Medication Sig    amLODIPine (NORVASC) 10 MG tablet Take 1 tablet (10 mg total) by mouth once daily.    apixaban (ELIQUIS) 5 mg Tab Take 1 tablet (5 mg total) by mouth 2 (two) times daily.    atorvastatin (LIPITOR) 20 MG tablet Take 1 tablet (20 mg total) by mouth once  daily.    busPIRone (BUSPAR) 15 MG tablet Take 1 tablet (15 mg total) by mouth 3 (three) times daily as needed (anxiety).    butalbital-acetaminophen-caffeine -40 mg (FIORICET, ESGIC) -40 mg per tablet TAKE 1 TABLET DAILY AS NEEDED FOR PAIN OR HEADACHES.    pregabalin (LYRICA) 100 MG capsule Take 1 capsule (100 mg total) by mouth 2 (two) times daily.    ranolazine (RANEXA) 1,000 mg Tb12 Take 1 tablet (1,000 mg total) by mouth 2 (two) times daily.    sacubitriL-valsartan (ENTRESTO) 49-51 mg per tablet Take 1 tablet by mouth 2 (two) times daily.    tiZANidine (ZANAFLEX) 4 MG tablet Take 1 tablet (4 mg total) by mouth every 6 (six) hours as needed (muscle pain).    zolpidem (AMBIEN) 5 MG Tab Take 1 tablet (5 mg total) by mouth nightly as needed (sleep).    ALPRAZolam (XANAX) 0.5 MG tablet Take 1 tablet (0.5 mg total) by mouth 2 (two) times daily as needed for Anxiety.    butalbital-acetaminophen-caffeine -40 mg (FIORICET, ESGIC) -40 mg per tablet TAKE 1 TABLET DAILY AS NEEDED FOR PAIN OR HEADACHES.    cholecalciferol, vitamin D3, 1,250 mcg (50,000 unit) capsule Take 1 capsule (50,000 Units total) by mouth once a week.    cloNIDine (CATAPRES) 0.1 MG tablet Take 2 tablets (0.2 mg total) by mouth 2 (two) times daily as needed (BP>170/95).    diclofenac sodium (VOLTAREN) 1 % Gel Apply 2 grams topically once daily.    DULoxetine (CYMBALTA) 30 MG capsule Take 1 capsule (30 mg total) by mouth once daily.    furosemide (LASIX) 40 MG tablet Take 1 tablet (40 mg total) by mouth 2 (two) times daily.    letrozole (FEMARA) 2.5 mg Tab Take 1 tablet (2.5 mg total) by mouth once daily.    levocetirizine (XYZAL) 5 MG tablet Take 1 tablet (5 mg total) by mouth every evening.    LIDOcaine (LIDODERM) 5 % Place 2 patches onto the skin once daily. Remove & Discard patch within 12 hours or as directed by MD    multivitamin (THERAGRAN) per tablet Take 1 tablet by mouth once daily.    oxybutynin (DITROPAN) 5 MG Tab Take  1 tablet (5 mg total) by mouth once daily.    oxyCODONE-acetaminophen (PERCOCET) 7.5-325 mg per tablet Take 1 tablet by mouth every 6 (six) hours as needed for Pain.     Family History       Problem Relation (Age of Onset)    Diabetes Maternal Grandmother, Maternal Grandfather, Mother    Hypertension Mother    Sickle cell anemia Daughter          Tobacco Use    Smoking status: Never     Passive exposure: Past    Smokeless tobacco: Never   Substance and Sexual Activity    Alcohol use: No    Drug use: No    Sexual activity: Yes     Partners: Male     Birth control/protection: Post-menopausal     Review of Systems   Constitutional:  Positive for activity change. Negative for chills and fever.   Respiratory:  Positive for shortness of breath. Negative for cough and wheezing.    Cardiovascular:  Positive for leg swelling. Negative for chest pain and palpitations.   Gastrointestinal:  Negative for abdominal pain, constipation, diarrhea, nausea and vomiting.   Genitourinary:  Negative for difficulty urinating.   Musculoskeletal:  Positive for arthralgias.   Neurological:  Positive for dizziness and weakness. Negative for headaches.   Psychiatric/Behavioral:  Negative for suicidal ideas. The patient is nervous/anxious.    All other systems reviewed and are negative.  Objective:     Vital Signs (Most Recent):  Temp: 97.5 °F (36.4 °C) (07/11/23 1255)  Pulse: (!) 50 (07/11/23 1500)  Resp: 18 (07/11/23 1500)  BP: (!) 152/81 (07/11/23 1500)  SpO2: 100 % (07/11/23 1500) Vital Signs (24h Range):  Temp:  [97.5 °F (36.4 °C)] 97.5 °F (36.4 °C)  Pulse:  [41-50] 50  Resp:  [17-28] 18  SpO2:  [97 %-100 %] 100 %  BP: (102-152)/(59-81) 152/81     Weight: (S)  (please obtain weight)  Body mass index is 44.28 kg/m².     Physical Exam  Vitals and nursing note reviewed.   Constitutional:       General: She is not in acute distress.     Appearance: She is morbidly obese. She is not ill-appearing.   HENT:      Head: Normocephalic and  atraumatic.      Right Ear: External ear normal.      Left Ear: External ear normal.   Eyes:      Pupils: Pupils are equal, round, and reactive to light.   Cardiovascular:      Rate and Rhythm: Regular rhythm. Bradycardia present.   Pulmonary:      Effort: Pulmonary effort is normal. No accessory muscle usage or respiratory distress.      Breath sounds: No wheezing.   Abdominal:      General: There is no distension.      Palpations: Abdomen is soft.      Tenderness: There is no abdominal tenderness. There is no guarding or rebound.   Musculoskeletal:      Right shoulder: Tenderness present.      Cervical back: Neck supple.      Right lower leg: Edema present.      Left lower leg: Edema present.   Skin:     General: Skin is warm and dry.   Neurological:      Mental Status: She is alert and oriented to person, place, and time. Mental status is at baseline.   Psychiatric:         Mood and Affect: Mood is anxious and depressed.            CRANIAL NERVES     CN III, IV, VI   Pupils are equal, round, and reactive to light.     Significant Labs: All pertinent labs within the past 24 hours have been reviewed.  Recent Lab Results         07/11/23  1611   07/11/23  1519   07/11/23  1317        Benzodiazepines Presumptive Positive           Methadone metabolites Negative           Phencyclidine Negative           Albumin     3.1       Alkaline Phosphatase     87       Allens Test   Pass         ALT     9       Amphetamine Screen, Ur Negative           Anion Gap     9       Appearance, UA Clear           AST     12       Bacteria, UA Occasional           Barbiturate Screen, Ur Presumptive Positive           Baso #     0.04       Basophil %     0.6       Bilirubin (UA) Negative           BILIRUBIN TOTAL     0.2  Comment: For infants and newborns, interpretation of results should be based  on gestational age, weight and in agreement with clinical  observations.    Premature Infant recommended reference ranges:  Up to 24  hours.............<8.0 mg/dL  Up to 48 hours............<12.0 mg/dL  3-5 days..................<15.0 mg/dL  6-29 days.................<15.0 mg/dL         BNP     104  Comment: Values of less than 100 pg/ml are consistent with non-CHF populations.       Site   LR         BUN     28       Calcium     8.9       Chloride     111       CO2     24       Cocaine (Metab.) Negative           Color, UA Yellow           CPK     48       Creatinine     1.5       Creatinine, Urine 33.1           DelSys   Room Air         Differential Method     Automated       eGFR     37       Eos #     0.1       Eosinophil %     2.0       Glucose     101       Glucose, UA Negative           Gran # (ANC)     3.9       Gran %     57.0       Hematocrit     32.7       Hemoglobin     10.1       Immature Grans (Abs)     0.02  Comment: Mild elevation in immature granulocytes is non specific and   can be seen in a variety of conditions including stress response,   acute inflammation, trauma and pregnancy. Correlation with other   laboratory and clinical findings is essential.         Immature Granulocytes     0.3       Ketones, UA Negative           Leukocytes, UA Trace           Lymph #     2.2       Lymph %     31.2       MCH     25.4       MCHC     30.9       MCV     82       Microscopic Comment SEE COMMENT  Comment: Other formed elements not mentioned in the report are not   present in the microscopic examination.              Mode   SPONT         Mono #     0.6       Mono %     8.9       MPV     9.3       NITRITE UA Positive           nRBC     0       Occult Blood UA Negative           Opiate Scrn, Ur Negative           pH, UA 7.0           Platelets     218       POC BE   -2         POC HCO3   23.1         POC PCO2   39.3         POC PH   7.377         POC PO2   106         POC SATURATED O2   98         Potassium     4.2       PROTEIN TOTAL     6.5       Protein, UA Negative  Comment: Recommend a 24 hour urine protein or a urine    protein/creatinine ratio if globulin induced proteinuria is  clinically suspected.             RBC     3.97       RDW     15.1       Sample   ARTERIAL         Sodium     144       Specific [place], UA 1.010           Specimen UA Urine, Clean Catch           Marijuana (THC) Metabolite Negative           Toxicology Information SEE COMMENT  Comment: This screen includes the following classes of drugs at the listed   cut-off:    Benzodiazepines 200 ng/ml  Methadone 300 ng/ml  Cocaine metabolite 300 ng/ml  Opiates 300 ng/ml  Barbiturates 200 ng/ml  Amphetamines 1000 ng/ml  Marijuana metabs (THC) 50 ng/ml  Phencyclidine (PCP) 25 ng/ml    This is a screening test. If results do not correlate with clinical   presentation, then a confirmatory send out test is advised.     This report is intended for use in clinical monitoring and management   of   patients. It is not intended for use in employment related drug   testing.             Troponin I     0.013  Comment: The reference interval for Troponin I represents the 99th percentile   cutoff   for our facility and is consistent with 3rd generation assay   performance.         UROBILINOGEN UA Negative           WBC, UA 1           WBC     6.89               Significant Imaging: I have reviewed all pertinent imaging results/findings within the past 24 hours.

## 2023-07-11 NOTE — ASSESSMENT & PLAN NOTE
Developed sudden-onset severe localized chest pain shortly after admission  Chest pain reproducible on examination, patient also notably anxious and was tearful  Repeat EKG shows sinus bradycardia with no acute ischemic changes  Initial troponin negative, we will repeat  Likely etiology musculoskeletal and possibly worsened by anxiety  Robaxin p.r.n.  1 time dose of IV Ativan ordered

## 2023-07-11 NOTE — ASSESSMENT & PLAN NOTE
Patient is identified as having Diastolic (HFpEF) heart failure that is Acute on chronic. CHF is currently uncontrolled due to Continued edema of extremities. Latest ECHO performed and demonstrates- Results for orders placed during the hospital encounter of 03/09/23    Echo    Interpretation Summary  · The left ventricle is normal in size with concentric hypertrophy and normal systolic function.  · The estimated ejection fraction is 65%.  · Normal left ventricular diastolic function.  · Normal right ventricular size with normal right ventricular systolic function.  · Normal central venous pressure (3 mmHg).  · The estimated PA systolic pressure is 30 mmHg.  . Continue Furosemide and ARNI and monitor clinical status closely. Monitor on telemetry. Patient is off CHF pathway.  Monitor strict Is&Os and daily weights.  Place on fluid restriction of 1.5 L. Cardiology HAS BEEN consulted. Continue to stress to patient importance of self efficacy and  on diet for CHF. Last BNP reviewed- and noted below   Recent Labs   Lab 07/11/23  1317   *   .

## 2023-07-11 NOTE — ASSESSMENT & PLAN NOTE
Patient has persistent depression which iscurrently uncontrolled.  She admits that she has not been taking her prescribed BuSpar as scheduled as instructed, has been taking it p.r.n..  Will Continue anti-depressant medications. We will not consult psychiatry at this time. Patient does not display psychosis at this time. Continue to monitor closely and adjust plan of care as needed.    Continue BuSpar to be taken as prescribed   PDMP reviewed, continue home Xanax as needed

## 2023-07-11 NOTE — ED PROVIDER NOTES
SCRIBE #1 NOTE: I, Olman Cope, am scribing for, and in the presence of, Melecio Arce MD. I have scribed the entire note.      History      Chief Complaint   Patient presents with    Fatigue     Per EMS, patient's family reports generalized weakness, EMS reported dry mucosal membranes, Narcan 0.5 mg administered, oral mucosa returned to being moist       Review of patient's allergies indicates:   Allergen Reactions    Pcn [penicillins] Hives        HPI   HPI    7/11/2023, 12:55 PM   History obtained from the patient and EMS      History of Present Illness: Susu Luther is a 73 y.o. female patient with a PMHx of CHF, HTN who presents to the Emergency Department for generalized weakness, onset this morning. EMS reports that the pt was bradycardic and hypotensive at the scene. Symptoms are constant and moderate in severity. No mitigating or exacerbating factors reported. Associated sxs includes fatigue. Patient denies any fever, chills, n/v/d, SOB, CP, numbness, dizziness, headache, and all other sxs at this time. Pt was given IVF and 0.5 mg narcan en route to the ED. No further complaints or concerns at this time.     Arrival mode: EMS    PCP: Primary Doctor No       Past Medical History:  Past Medical History:   Diagnosis Date    Cataract     Bilateral    Chronic diastolic congestive heart failure 08/25/2020    Dizziness 03/12/2023    Encounter for blood transfusion     History of repair of aneurysm of abdominal aorta using endovascular stent graft     DR Bonds    Hx of psychiatric care     Hypertension     Major depressive disorder, single episode, moderate with anxious distress 01/14/2020    Malignant neoplasm of upper-outer quadrant of right breast in female, estrogen receptor positive 03/14/2019    radiation    Psychiatric problem     Sleep apnea     Sleep difficulties     Stroke 2009    no residual defect    Therapy        Past Surgical History:  Past Surgical History:   Procedure Laterality Date     APPENDECTOMY      AXILLARY NODE DISSECTION Right 2020    Procedure: LYMPHADENECTOMY, AXILLARY;  Surgeon: Artemio Starks MD;  Location: [facility] OR;  Service: General;  Laterality: Right;    BIOPSY OF AXILLARY NODE Right 2021    Procedure: BIOPSY, LYMPH NODE, AXILLARY;  Surgeon: Artemio Sutton MD;  [facility];  Service: General;  Laterality: Right;    BREAST BIOPSY          BREAST LUMPECTOMY      2019     SECTION      x 1    OOPHORECTOMY      right     SENTINEL LYMPH NODE BIOPSY Right 2019    Procedure: BIOPSY, LYMPH NODE, SENTINEL;  Surgeon: Artemio Starks MD;  Location: [facility] OR;  Service: General;  Laterality: Right;    splenic artery aneurysm repair      TONSILLECTOMY           Family History:  Family History   Problem Relation Age of Onset    Diabetes Maternal Grandmother     Diabetes Maternal Grandfather     Diabetes Mother     Hypertension Mother     Sickle cell anemia Daughter     Breast cancer Neg Hx     Colon cancer Neg Hx     Ovarian cancer Neg Hx        Social History:  Social History     Tobacco Use    Smoking status: Never     Passive exposure: Past    Smokeless tobacco: Never   Substance and Sexual Activity    Alcohol use: No    Drug use: No    Sexual activity: Yes     Partners: Male     Birth control/protection: Post-menopausal       ROS   Review of Systems   Constitutional:  Positive for fatigue. Negative for chills and fever.   HENT:  Negative for sore throat.    Respiratory:  Negative for shortness of breath.    Cardiovascular:  Negative for chest pain.   Gastrointestinal:  Negative for diarrhea, nausea and vomiting.   Genitourinary:  Negative for dysuria.   Musculoskeletal:  Negative for back pain.   Skin:  Negative for rash.   Neurological:  Positive for weakness (generalized). Negative for dizziness, numbness and headaches.   Hematological:  Does not bruise/bleed easily.   All other systems reviewed and are negative.    Physical Exam      Initial  Vitals [07/11/23 1255]   BP Pulse Resp Temp SpO2   106/67 (!) 45 18 97.5 °F (36.4 °C) 97 %      MAP       --          Physical Exam  Nursing Notes and Vital Signs Reviewed.  Constitutional: Patient is in no acute distress. Well-developed and well-nourished.  Head: Atraumatic. Normocephalic.  Eyes: PERRL. EOM intact. Conjunctivae are not pale. No scleral icterus.  ENT: Mucous membranes are moist. Oropharynx is clear and symmetric.    Neck: Supple. Full ROM.   Cardiovascular: Regular rate. Regular rhythm. No murmurs, rubs, or gallops. Distal pulses are 2+ and symmetric.  Pulmonary/Chest: No respiratory distress. Clear to auscultation bilaterally. No wheezing or rales.  Abdominal: Soft and non-distended.  There is no tenderness.  No rebound, guarding, or rigidity.   Musculoskeletal: Moves all extremities. No obvious deformities. No edema.  Skin: Warm and dry.  Neurological:  Alert, awake, and appropriate.  Normal speech.  No acute focal neurological deficits are appreciated.  Psychiatric: Normal affect. Good eye contact. Appropriate in content.    ED Course    Critical Care    Date/Time: 7/11/2023 2:35 PM  Performed by: Melecio Arce MD  Authorized by: Melecio Arce MD   Direct patient critical care time: 25 minutes  Additional history critical care time: 5 minutes  Ordering / reviewing critical care time: 15 minutes  Documentation critical care time: 10 minutes  Consulting other physicians critical care time: 5 minutes  Total critical care time (exclusive of procedural time) : 60 minutes  Critical care time was exclusive of separately billable procedures and treating other patients and teaching time.  Critical care was necessary to treat or prevent imminent or life-threatening deterioration of the following conditions: Bradycardia.  Critical care was time spent personally by me on the following activities: blood draw for specimens, development of treatment plan with patient or surrogate, discussions with  "consultants, interpretation of cardiac output measurements, evaluation of patient's response to treatment, examination of patient, obtaining history from patient or surrogate, ordering and performing treatments and interventions, ordering and review of laboratory studies, ordering and review of radiographic studies, pulse oximetry, re-evaluation of patient's condition and review of old charts.      ED Vital Signs:  Vitals:    07/11/23 1255 07/11/23 1315 07/11/23 1330 07/11/23 1400   BP: 106/67  (!) 102/59 109/60   Pulse: (!) 45 (!) 41 (!) 43 (!) 42   Resp: 18  (!) 28 (!) 24   Temp: 97.5 °F (36.4 °C)      TempSrc: Oral      SpO2: 97%  100% 100%   Height: 5' 9" (1.753 m)       07/11/23 1440 07/11/23 1500   BP: 119/71 (!) 152/81   Pulse: (!) 48 (!) 50   Resp: 17 18   Temp:     TempSrc:     SpO2: 100% 100%   Height:         Abnormal Lab Results:  Labs Reviewed   CBC W/ AUTO DIFFERENTIAL - Abnormal; Notable for the following components:       Result Value    RBC 3.97 (*)     Hemoglobin 10.1 (*)     Hematocrit 32.7 (*)     MCH 25.4 (*)     MCHC 30.9 (*)     RDW 15.1 (*)     All other components within normal limits   COMPREHENSIVE METABOLIC PANEL - Abnormal; Notable for the following components:    Chloride 111 (*)     BUN 28 (*)     Creatinine 1.5 (*)     Albumin 3.1 (*)     ALT 9 (*)     eGFR 37 (*)     All other components within normal limits   B-TYPE NATRIURETIC PEPTIDE - Abnormal; Notable for the following components:     (*)     All other components within normal limits   CK   TROPONIN I   URINALYSIS, REFLEX TO URINE CULTURE   DRUG SCREEN PANEL, URINE EMERGENCY        All Lab Results:  Results for orders placed or performed during the hospital encounter of 07/11/23   CBC Auto Differential   Result Value Ref Range    WBC 6.89 3.90 - 12.70 K/uL    RBC 3.97 (L) 4.00 - 5.40 M/uL    Hemoglobin 10.1 (L) 12.0 - 16.0 g/dL    Hematocrit 32.7 (L) 37.0 - 48.5 %    MCV 82 82 - 98 fL    MCH 25.4 (L) 27.0 - 31.0 pg    " MCHC 30.9 (L) 32.0 - 36.0 g/dL    RDW 15.1 (H) 11.5 - 14.5 %    Platelets 218 150 - 450 K/uL    MPV 9.3 9.2 - 12.9 fL    Immature Granulocytes 0.3 0.0 - 0.5 %    Gran # (ANC) 3.9 1.8 - 7.7 K/uL    Immature Grans (Abs) 0.02 0.00 - 0.04 K/uL    Lymph # 2.2 1.0 - 4.8 K/uL    Mono # 0.6 0.3 - 1.0 K/uL    Eos # 0.1 0.0 - 0.5 K/uL    Baso # 0.04 0.00 - 0.20 K/uL    nRBC 0 0 /100 WBC    Gran % 57.0 38.0 - 73.0 %    Lymph % 31.2 18.0 - 48.0 %    Mono % 8.9 4.0 - 15.0 %    Eosinophil % 2.0 0.0 - 8.0 %    Basophil % 0.6 0.0 - 1.9 %    Differential Method Automated    Comprehensive Metabolic Panel   Result Value Ref Range    Sodium 144 136 - 145 mmol/L    Potassium 4.2 3.5 - 5.1 mmol/L    Chloride 111 (H) 95 - 110 mmol/L    CO2 24 23 - 29 mmol/L    Glucose 101 70 - 110 mg/dL    BUN 28 (H) 8 - 23 mg/dL    Creatinine 1.5 (H) 0.5 - 1.4 mg/dL    Calcium 8.9 8.7 - 10.5 mg/dL    Total Protein 6.5 6.0 - 8.4 g/dL    Albumin 3.1 (L) 3.5 - 5.2 g/dL    Total Bilirubin 0.2 0.1 - 1.0 mg/dL    Alkaline Phosphatase 87 55 - 135 U/L    AST 12 10 - 40 U/L    ALT 9 (L) 10 - 44 U/L    eGFR 37 (A) >60 mL/min/1.73 m^2    Anion Gap 9 8 - 16 mmol/L   BNP   Result Value Ref Range     (H) 0 - 99 pg/mL   CK   Result Value Ref Range    CPK 48 20 - 180 U/L   Troponin I   Result Value Ref Range    Troponin I 0.013 0.000 - 0.026 ng/mL     *Note: Due to a large number of results and/or encounters for the requested time period, some results have not been displayed. A complete set of results can be found in Results Review.     Imaging Results:  Imaging Results              X-Ray Chest AP Portable (Final result)  Result time 07/11/23 13:24:01      Final result by Domenica Cochran MD (Timothy) (07/11/23 13:24:01)                   Impression:      Mild cardiomegaly with clear lungs.      Electronically signed by: Domenica Cochran MD  Date:    07/11/2023  Time:    13:24               Narrative:    EXAMINATION:  XR CHEST AP PORTABLE    CLINICAL  HISTORY:  Bradycardia, bradycard;    COMPARISON:  03/10/2023.    FINDINGS:  Mild cardiomegaly.  The lung fields are clear. No acute cardiopulmonary infiltrate.    One view.                                     The EKG was ordered, reviewed, and independently interpreted by the ED provider.  Interpretation time: 13:30  Rate: 42 BPM  Rhythm: Marked sinus bradycardia  Interpretation: Left axis deviation. Pulmonary disease pattern. Incomplete RBBB. LVH. No STEMI.           The Emergency Provider reviewed the vital signs and test results, which are outlined above.    ED Discussion     2:29 PM: Discussed case with Dr. Saez ([facility]). Dr. Louise agrees with current care and management of pt and accepts admission.   Admitting Service: [facility]  Admitting Physician: Dr. Saez  Admit to: Obs Tele    2:30 PM: Re-evaluated pt. I have discussed test results, shared treatment plan, and the need for admission with patient and family at bedside. Pt and family express understanding at this time and agree with all information. All questions answered. Pt and family have no further questions or concerns at this time. Pt is ready for admit.         ED Medication(s):  Medications   sodium chloride 0.9% bolus 1,000 mL 1,000 mL (1,000 mLs Intravenous New Bag 7/11/23 1320)   naloxone 0.4 mg/mL injection 2 mg (2 mg Intravenous Given by Other 7/11/23 1314)       New Prescriptions    No medications on file       Medical Decision Making    Medical Decision Making:   Initial Assessment:   Presents with weakness and fatigue. Found to have a heart rate in the 40s.  Questionable compliance with meds.  No BB listed on med list  Differential Diagnosis:   Bradycardia, Clonidine overdose accidental  Clinical Tests:   Lab Tests: Ordered and Reviewed  Radiological Study: Ordered and Reviewed  Medical Tests: Ordered and Reviewed  ED Management:  Labs and imaging reviewed by me.  No acute findings.  Considered admission, patient is  agreeable.    Other:   I have discussed this case with another health care provider.       <> Summary of the Discussion: Case discussed with Dr. Saez () will place in observation         Scribe Attestation:   Scribe #1: I performed the above scribed service and the documentation accurately describes the services I performed. I attest to the accuracy of the note.    Attending:   Physician Attestation Statement for Scribe #1: I, Melecio Arce MD, personally performed the services described in this documentation, as scribed by Olman Cope, in my presence, and it is both accurate and complete.          Clinical Impression       ICD-10-CM ICD-9-CM   1. Bradycardia  R00.1 427.89   2. Weakness  R53.1 780.79       Disposition:   Disposition: Placed in Observation  Condition: Fair       Melecio Arce MD  07/11/23 [id number]

## 2023-07-11 NOTE — H&P
[facility] - Emergency Dept.  [facility] Medicine  History & Physical    Patient Name: Susu Luther  MRN: [id number]  Patient Class: OP- Observation  Admission Date: 7/11/2023  Attending Physician: Marianne Louise DO   Primary Care Provider: Alexus Casas NP         Patient information was obtained from patient, spouse/SO, past medical records and ER records.     Subjective:     Principal Problem:Bradycardia    Chief Complaint:   Chief Complaint   Patient presents with    Fatigue     Per EMS, patient's family reports generalized weakness, EMS reported dry mucosal membranes, Narcan 0.5 mg administered, oral mucosa returned to being moist        HPI: Susu Luther is a 73 y.o. female with Chronic diastolic congestive heart failure, History of repair of aneurysm of abdominal aorta using endovascular stent graft, psychiatric care, Hypertension, Major depressive disorder, Malignant neoplasm of upper-outer quadrant of right breast in female, estrogen receptor positive, Sleep apnea, Stroke  who presented to ED on 7/11/2023 with weakness.  Patient reports that she was feeling extremely short of breath this morning, subsequently a friend called EMS.  Additionally she has been having a lot of falls at home including last Saturday.  Reports that she fell onto her right shoulder and since then has been having a lot of pain on her arm and shoulder.  Additionally has been having worsening swelling of her legs making it difficult and painful for her to walk.  States that she had recently been doubling up on her home Lasix dose per the advice of Cardiology.  Has not had any chest pain.  Admits to ongoing depression and anxiety which has not been well-controlled.  Per EMS report, patient was found to be bradycardic and hypotensive when they arrived.  Subsequently she was given IV fluid and Narcan en route.  On arrival at ED noted to have /67, heart rate 45.  Patient placed in observation on hospital medicine service  for further management.  Cardiology consulted.    Shortly after patient was admitted, she developed acute onset of severe chest pain.  EKG showed sinus bradycardia, with no significant change compared to EKG obtained earlier in the day.  On reexamination, chest pain reproducible was tearful and appears significantly more anxious than she has been previously.    PCP: Alexus Casas NP    SDM:  Daughter, Darío Rodriguez        Past Medical History:   Diagnosis Date    Cataract     Bilateral    Chronic diastolic congestive heart failure 2020    Dizziness 2023    Encounter for blood transfusion     History of repair of aneurysm of abdominal aorta using endovascular stent graft     DR Bonds     of psychiatric care     Hypertension     Major depressive disorder, single episode, moderate with anxious distress 2020    Malignant neoplasm of upper-outer quadrant of right breast in female, estrogen receptor positive 2019    radiation    Psychiatric problem     Sleep apnea     Sleep difficulties     Stroke     no residual defect    Therapy        Past Surgical History:   Procedure Laterality Date    APPENDECTOMY      AXILLARY NODE DISSECTION Right 2020    Procedure: LYMPHADENECTOMY, AXILLARY;  Surgeon: Artemio Starks MD;  Location: [facility] OR;  Service: General;  Laterality: Right;    BIOPSY OF AXILLARY NODE Right 2021    Procedure: BIOPSY, LYMPH NODE, AXILLARY;  Surgeon: Artemio Sutton MD;  Location: [address];  Service: General;  Laterality: Right;    BREAST BIOPSY      2019    BREAST LUMPECTOMY      2019     SECTION      x 1    OOPHORECTOMY      right     SENTINEL LYMPH NODE BIOPSY Right 2019    Procedure: BIOPSY, LYMPH NODE, SENTINEL;  Surgeon: Artemio Starks MD;  Location: [facility] OR;  Service: General;  Laterality: Right;    splenic artery aneurysm repair      TONSILLECTOMY         Review of patient's allergies  indicates:   Allergen Reactions    Pcn [penicillins] Hives       No current facility-administered medications on file prior to encounter.     Current Outpatient Medications on File Prior to Encounter   Medication Sig    amLODIPine (NORVASC) 10 MG tablet Take 1 tablet (10 mg total) by mouth once daily.    apixaban (ELIQUIS) 5 mg Tab Take 1 tablet (5 mg total) by mouth 2 (two) times daily.    atorvastatin (LIPITOR) 20 MG tablet Take 1 tablet (20 mg total) by mouth once daily.    busPIRone (BUSPAR) 15 MG tablet Take 1 tablet (15 mg total) by mouth 3 (three) times daily as needed (anxiety).    butalbital-acetaminophen-caffeine -40 mg (FIORICET, ESGIC) -40 mg per tablet TAKE 1 TABLET DAILY AS NEEDED FOR PAIN OR HEADACHES.    pregabalin (LYRICA) 100 MG capsule Take 1 capsule (100 mg total) by mouth 2 (two) times daily.    ranolazine (RANEXA) 1,000 mg Tb12 Take 1 tablet (1,000 mg total) by mouth 2 (two) times daily.    sacubitriL-valsartan (ENTRESTO) 49-51 mg per tablet Take 1 tablet by mouth 2 (two) times daily.    tiZANidine (ZANAFLEX) 4 MG tablet Take 1 tablet (4 mg total) by mouth every 6 (six) hours as needed (muscle pain).    zolpidem (AMBIEN) 5 MG Tab Take 1 tablet (5 mg total) by mouth nightly as needed (sleep).    ALPRAZolam (XANAX) 0.5 MG tablet Take 1 tablet (0.5 mg total) by mouth 2 (two) times daily as needed for Anxiety.    butalbital-acetaminophen-caffeine -40 mg (FIORICET, ESGIC) -40 mg per tablet TAKE 1 TABLET DAILY AS NEEDED FOR PAIN OR HEADACHES.    cholecalciferol, vitamin D3, 1,250 mcg (50,000 unit) capsule Take 1 capsule (50,000 Units total) by mouth once a week.    cloNIDine (CATAPRES) 0.1 MG tablet Take 2 tablets (0.2 mg total) by mouth 2 (two) times daily as needed (BP>170/95).    diclofenac sodium (VOLTAREN) 1 % Gel Apply 2 grams topically once daily.    DULoxetine (CYMBALTA) 30 MG capsule Take 1 capsule (30 mg total) by mouth once daily.    furosemide  (LASIX) 40 MG tablet Take 1 tablet (40 mg total) by mouth 2 (two) times daily.    letrozole (FEMARA) 2.5 mg Tab Take 1 tablet (2.5 mg total) by mouth once daily.    levocetirizine (XYZAL) 5 MG tablet Take 1 tablet (5 mg total) by mouth every evening.    LIDOcaine (LIDODERM) 5 % Place 2 patches onto the skin once daily. Remove & Discard patch within 12 hours or as directed by MD    multivitamin (THERAGRAN) per tablet Take 1 tablet by mouth once daily.    oxybutynin (DITROPAN) 5 MG Tab Take 1 tablet (5 mg total) by mouth once daily.    oxyCODONE-acetaminophen (PERCOCET) 7.5-325 mg per tablet Take 1 tablet by mouth every 6 (six) hours as needed for Pain.     Family History       Problem Relation (Age of Onset)    Diabetes Maternal Grandmother, Maternal Grandfather, Mother    Hypertension Mother    Sickle cell anemia Daughter          Tobacco Use    Smoking status: Never     Passive exposure: Past    Smokeless tobacco: Never   Substance and Sexual Activity    Alcohol use: No    Drug use: No    Sexual activity: Yes     Partners: Male     Birth control/protection: Post-menopausal     Review of Systems   Constitutional:  Positive for activity change. Negative for chills and fever.   Respiratory:  Positive for shortness of breath. Negative for cough and wheezing.    Cardiovascular:  Positive for leg swelling. Negative for chest pain and palpitations.   Gastrointestinal:  Negative for abdominal pain, constipation, diarrhea, nausea and vomiting.   Genitourinary:  Negative for difficulty urinating.   Musculoskeletal:  Positive for arthralgias.   Neurological:  Positive for dizziness and weakness. Negative for headaches.   Psychiatric/Behavioral:  Negative for suicidal ideas. The patient is nervous/anxious.    All other systems reviewed and are negative.  Objective:     Vital Signs (Most Recent):  Temp: 97.5 °F (36.4 °C) (07/11/23 1255)  Pulse: (!) 50 (07/11/23 1500)  Resp: 18 (07/11/23 1500)  BP: (!) 152/81  (07/11/23 1500)  SpO2: 100 % (07/11/23 1500) Vital Signs (24h Range):  Temp:  [97.5 °F (36.4 °C)] 97.5 °F (36.4 °C)  Pulse:  [41-50] 50  Resp:  [17-28] 18  SpO2:  [97 %-100 %] 100 %  BP: (102-152)/(59-81) 152/81     Weight: (S)  (please obtain weight)  Body mass index is 44.28 kg/m².     Physical Exam  Vitals and nursing note reviewed.   Constitutional:       General: She is not in acute distress.     Appearance: She is morbidly obese. She is not ill-appearing.   HENT:      Head: Normocephalic and atraumatic.      Right Ear: External ear normal.      Left Ear: External ear normal.   Eyes:      Pupils: Pupils are equal, round, and reactive to light.   Cardiovascular:      Rate and Rhythm: Regular rhythm. Bradycardia present.   Pulmonary:      Effort: Pulmonary effort is normal. No accessory muscle usage or respiratory distress.      Breath sounds: No wheezing.   Abdominal:      General: There is no distension.      Palpations: Abdomen is soft.      Tenderness: There is no abdominal tenderness. There is no guarding or rebound.   Musculoskeletal:      Right shoulder: Tenderness present.      Cervical back: Neck supple.      Right lower leg: Edema present.      Left lower leg: Edema present.   Skin:     General: Skin is warm and dry.   Neurological:      Mental Status: She is alert and oriented to person, place, and time. Mental status is at baseline.   Psychiatric:         Mood and Affect: Mood is anxious and depressed.            CRANIAL NERVES     CN III, IV, VI   Pupils are equal, round, and reactive to light.     Significant Labs: All pertinent labs within the past 24 hours have been reviewed.  Recent Lab Results         07/11/23  1611   07/11/23  1519   07/11/23  1317        Benzodiazepines Presumptive Positive           Methadone metabolites Negative           Phencyclidine Negative           Albumin     3.1       Alkaline Phosphatase     87       Allens Test   Pass         ALT     9       Amphetamine Screen, Ur  Negative           Anion Gap     9       Appearance, UA Clear           AST     12       Bacteria, UA Occasional           Barbiturate Screen, Ur Presumptive Positive           Baso #     0.04       Basophil %     0.6       Bilirubin (UA) Negative           BILIRUBIN TOTAL     0.2  Comment: For infants and newborns, interpretation of results should be based  on gestational age, weight and in agreement with clinical  observations.    Premature Infant recommended reference ranges:  Up to 24 hours.............<8.0 mg/dL  Up to 48 hours............<12.0 mg/dL  3-5 days..................<15.0 mg/dL  6-29 days.................<15.0 mg/dL         BNP     104  Comment: Values of less than 100 pg/ml are consistent with non-CHF populations.       Site   LR         BUN     28       Calcium     8.9       Chloride     111       CO2     24       Cocaine (Metab.) Negative           Color, UA Yellow           CPK     48       Creatinine     1.5       Creatinine, Urine 33.1           [facility]   Room Air         Differential Method     Automated       eGFR     37       Eos #     0.1       Eosinophil %     2.0       Glucose     101       Glucose, UA Negative           Gran # (ANC)     3.9       Gran %     57.0       Hematocrit     32.7       Hemoglobin     10.1       Immature Grans (Abs)     0.02  Comment: Mild elevation in immature granulocytes is non specific and   can be seen in a variety of conditions including stress response,   acute inflammation, trauma and pregnancy. Correlation with other   laboratory and clinical findings is essential.         Immature Granulocytes     0.3       Ketones, UA Negative           Leukocytes, UA Trace           Lymph #     2.2       Lymph %     31.2       MCH     25.4       MCHC     30.9       MCV     82       Microscopic Comment SEE COMMENT  Comment: Other formed elements not mentioned in the report are not   present in the microscopic examination.              Mode   SPONT         Mono #      0.6       Mono %     8.9       MPV     9.3       NITRITE UA Positive           nRBC     0       Occult Blood UA Negative           Opiate Scrn, Ur Negative           pH, UA 7.0           Platelets     218       POC BE   -2         POC HCO3   23.1         POC PCO2   39.3         POC PH   7.377         POC PO2   106         POC SATURATED O2   98         Potassium     4.2       PROTEIN TOTAL     6.5       Protein, UA Negative  Comment: Recommend a 24 hour urine protein or a urine   protein/creatinine ratio if globulin induced proteinuria is  clinically suspected.             RBC     3.97       RDW     15.1       Sample   ARTERIAL         Sodium     144       Specific [place], UA 1.010           Specimen UA Urine, Clean Catch           Marijuana (THC) Metabolite Negative           Toxicology Information SEE COMMENT  Comment: This screen includes the following classes of drugs at the listed   cut-off:    Benzodiazepines 200 ng/ml  Methadone 300 ng/ml  Cocaine metabolite 300 ng/ml  Opiates 300 ng/ml  Barbiturates 200 ng/ml  Amphetamines 1000 ng/ml  Marijuana metabs (THC) 50 ng/ml  Phencyclidine (PCP) 25 ng/ml    This is a screening test. If results do not correlate with clinical   presentation, then a confirmatory send out test is advised.     This report is intended for use in clinical monitoring and management   of   patients. It is not intended for use in employment related drug   testing.             Troponin I     0.013  Comment: The reference interval for Troponin I represents the 99th percentile   cutoff   for our facility and is consistent with 3rd generation assay   performance.         UROBILINOGEN UA Negative           WBC, UA 1           WBC     6.89               Significant Imaging: I have reviewed all pertinent imaging results/findings within the past 24 hours.    Assessment/Plan:     * Bradycardia  Patient was noted to have bradycardia with heart rate in the 40s on arrival  She is not on any beta-blocker  or any other meds that would cause AV jena blockage  Cardiac monitoring  Cardiology consulted  Etiology unclear      UTI (urinary tract infection)  UA consistent with UTI with positive nitrite, trace leukocyte  Urine culture in process  Started on ceftriaxone for empiric treatment    Right shoulder pain  Patient reports frequent falls recently including fall onto her right shoulder last Saturday   Right shoulder x-ray showed no evidence of fracture or dislocation  Robaxin p.r.n.      Frequent falls  PT/OT evaluation  Concern that polypharmacy is contributory to patient's weakness and frequent falls as she is on Ambien, Percocet, Xanax, Fioricet outpatient  Minimize sedating meds    Urinary retention  Patient noted to have urinary retention in the ED with bladder scan showing > 690 mL , straight cath ordered  Per chart review, she had urinary retention during her last hospital admission   Serial bladder scan ordered  May need to have Puckett placed  Stop oxybutynin  May need to be started on Flomax if she continues to have retention        Chronic diastolic congestive heart failure  Patient is identified as having Diastolic (HFpEF) heart failure that is Acute on chronic. CHF is currently uncontrolled due to Continued edema of extremities. Latest ECHO performed and demonstrates- Results for orders placed during the hospital encounter of 03/09/23    Echo    Interpretation Summary  · The left ventricle is normal in size with concentric hypertrophy and normal systolic function.  · The estimated ejection fraction is 65%.  · Normal left ventricular diastolic function.  · Normal right ventricular size with normal right ventricular systolic function.  · Normal central venous pressure (3 mmHg).  · The estimated PA systolic pressure is 30 mmHg.  . Continue Furosemide and ARNI and monitor clinical status closely. Monitor on telemetry. Patient is off CHF pathway.  Monitor strict Is&Os and daily weights.  Place on fluid  restriction of 1.5 L. Cardiology HAS BEEN consulted. Continue to stress to patient importance of self efficacy and  on diet for CHF. Last BNP reviewed- and noted below   Recent Labs   Lab 07/11/23  1317   *   .    History of pulmonary embolism  Continue home Eliquis      Other chest pain  Developed sudden-onset severe localized chest pain shortly after admission  Chest pain reproducible on examination, patient also notably anxious and was tearful  Repeat EKG shows sinus bradycardia with no acute ischemic changes  Initial troponin negative, we will repeat  Likely etiology musculoskeletal and possibly worsened by anxiety  Robaxin p.r.n.  1 time dose of IV Ativan ordered      Chronic kidney disease, stage 3a  Renal function appears to be slightly worse above baseline  Strict I&Os  Avoid nephrotoxins and renal dose meds as needed  Monitor renal function        Major depressive disorder, recurrent episode, moderate with anxious distress  Patient has persistent depression which iscurrently uncontrolled.  She admits that she has not been taking her prescribed BuSpar as scheduled as instructed, has been taking it p.r.n..  Will Continue anti-depressant medications. We will not consult psychiatry at this time. Patient does not display psychosis at this time. Continue to monitor closely and adjust plan of care as needed.    Continue BuSpar to be taken as prescribed   PDMP reviewed, continue home Xanax as needed      Class 3 severe obesity in adult  Body mass index is 47.4 kg/m². Morbid obesity complicates all aspects of disease management from diagnostic modalities to treatment. Weight loss encouraged and health benefits explained to patient.         Hypertension  Reports compliance with her home medication including amlodipine, Lasix      Malignant neoplasm of upper-outer quadrant of right breast in female, estrogen receptor positive  Follow-up with Oncology as scheduled      NILS (obstructive sleep  apnea)  Reports that she is now using CPAP currently after her device was recalled  Monitor respiratory status  Nocturnal O2 as needed      VTE Risk Mitigation (From admission, onward)         Ordered     apixaban tablet 5 mg  2 times daily         07/11/23 8417     Reason for No Pharmacological VTE Prophylaxis  Once        Question:  Reasons:  Answer:  Already adequately anticoagulated on oral Anticoagulants    07/11/23 1512     IP VTE HIGH RISK PATIENT  Once         07/11/23 1512     Place sequential compression device  Until discontinued         07/11/23 1512                   On 07/11/2023, patient should be placed in hospital observation services under my care.        Marianne Louise DO  Department of Hospital Medicine  [facility] - Emergency Dept.

## 2023-07-11 NOTE — TELEPHONE ENCOUNTER
Would you mind calling her to see what she needs. I have already increased her lasix and she was supposed to follow up with her cardiologist

## 2023-07-11 NOTE — ED NOTES
at bedside. Pt connected to leads, pulse ox, and BP cuff. Bed locked and lowered, rails x2. Call bell within reach, pt advised not to get up without assistance. Pt verbalized understanding

## 2023-07-12 LAB
ALBUMIN SERPL BCP-MCNC: 3.2 G/DL (ref 3.5–5.2)
ALP SERPL-CCNC: 96 U/L (ref 55–135)
ALT SERPL W/O P-5'-P-CCNC: 12 U/L (ref 10–44)
ANION GAP SERPL CALC-SCNC: 10 MMOL/L (ref 8–16)
AST SERPL-CCNC: 15 U/L (ref 10–40)
BASOPHILS # BLD AUTO: 0.05 K/UL (ref 0–0.2)
BASOPHILS NFR BLD: 0.6 % (ref 0–1.9)
BILIRUB SERPL-MCNC: 0.3 MG/DL (ref 0.1–1)
BUN SERPL-MCNC: 23 MG/DL (ref 8–23)
CALCIUM SERPL-MCNC: 9.2 MG/DL (ref 8.7–10.5)
CHLORIDE SERPL-SCNC: 112 MMOL/L (ref 95–110)
CO2 SERPL-SCNC: 21 MMOL/L (ref 23–29)
CREAT SERPL-MCNC: 1.3 MG/DL (ref 0.5–1.4)
DIFFERENTIAL METHOD: ABNORMAL
EOSINOPHIL # BLD AUTO: 0.2 K/UL (ref 0–0.5)
EOSINOPHIL NFR BLD: 2.2 % (ref 0–8)
ERYTHROCYTE [DISTWIDTH] IN BLOOD BY AUTOMATED COUNT: 15 % (ref 11.5–14.5)
EST. GFR  (NO RACE VARIABLE): 44 ML/MIN/1.73 M^2
GLUCOSE SERPL-MCNC: 91 MG/DL (ref 70–110)
HCT VFR BLD AUTO: 37.8 % (ref 37–48.5)
HGB BLD-MCNC: 11.6 G/DL (ref 12–16)
IMM GRANULOCYTES # BLD AUTO: 0.02 K/UL (ref 0–0.04)
IMM GRANULOCYTES NFR BLD AUTO: 0.3 % (ref 0–0.5)
LYMPHOCYTES # BLD AUTO: 2.6 K/UL (ref 1–4.8)
LYMPHOCYTES NFR BLD: 32.6 % (ref 18–48)
MAGNESIUM SERPL-MCNC: 2.1 MG/DL (ref 1.6–2.6)
MCH RBC QN AUTO: 25.7 PG (ref 27–31)
MCHC RBC AUTO-ENTMCNC: 30.7 G/DL (ref 32–36)
MCV RBC AUTO: 84 FL (ref 82–98)
MONOCYTES # BLD AUTO: 0.7 K/UL (ref 0.3–1)
MONOCYTES NFR BLD: 8.7 % (ref 4–15)
NEUTROPHILS # BLD AUTO: 4.4 K/UL (ref 1.8–7.7)
NEUTROPHILS NFR BLD: 55.6 % (ref 38–73)
NRBC BLD-RTO: 0 /100 WBC
PLATELET # BLD AUTO: 183 K/UL (ref 150–450)
PMV BLD AUTO: 11 FL (ref 9.2–12.9)
POTASSIUM SERPL-SCNC: 4.4 MMOL/L (ref 3.5–5.1)
PROT SERPL-MCNC: 6.8 G/DL (ref 6–8.4)
RBC # BLD AUTO: 4.51 M/UL (ref 4–5.4)
SODIUM SERPL-SCNC: 143 MMOL/L (ref 136–145)
TROPONIN I SERPL DL<=0.01 NG/ML-MCNC: 0.03 NG/ML (ref 0–0.03)
WBC # BLD AUTO: 7.85 K/UL (ref 3.9–12.7)

## 2023-07-12 PROCEDURE — 63600175 PHARM REV CODE 636 W HCPCS: Mod: HCNC | Performed by: INTERNAL MEDICINE

## 2023-07-12 PROCEDURE — 36415 COLL VENOUS BLD VENIPUNCTURE: CPT | Mod: HCNC | Performed by: NURSE PRACTITIONER

## 2023-07-12 PROCEDURE — 25000003 PHARM REV CODE 250: Mod: HCNC | Performed by: INTERNAL MEDICINE

## 2023-07-12 PROCEDURE — 96375 TX/PRO/DX INJ NEW DRUG ADDON: CPT | Mod: 59,HCNC

## 2023-07-12 PROCEDURE — 93010 EKG 12-LEAD: ICD-10-PCS | Mod: HCNC,,, | Performed by: STUDENT IN AN ORGANIZED HEALTH CARE EDUCATION/TRAINING PROGRAM

## 2023-07-12 PROCEDURE — 51702 INSERT TEMP BLADDER CATH: CPT | Mod: HCNC

## 2023-07-12 PROCEDURE — 99214 PR OFFICE/OUTPT VISIT, EST, LEVL IV, 30-39 MIN: ICD-10-PCS | Mod: 25,HCNC,, | Performed by: STUDENT IN AN ORGANIZED HEALTH CARE EDUCATION/TRAINING PROGRAM

## 2023-07-12 PROCEDURE — 63600175 PHARM REV CODE 636 W HCPCS: Mod: HCNC | Performed by: STUDENT IN AN ORGANIZED HEALTH CARE EDUCATION/TRAINING PROGRAM

## 2023-07-12 PROCEDURE — 97166 OT EVAL MOD COMPLEX 45 MIN: CPT | Mod: HCNC

## 2023-07-12 PROCEDURE — 85025 COMPLETE CBC W/AUTO DIFF WBC: CPT | Mod: HCNC | Performed by: INTERNAL MEDICINE

## 2023-07-12 PROCEDURE — 93010 ELECTROCARDIOGRAM REPORT: CPT | Mod: HCNC,,, | Performed by: STUDENT IN AN ORGANIZED HEALTH CARE EDUCATION/TRAINING PROGRAM

## 2023-07-12 PROCEDURE — 80053 COMPREHEN METABOLIC PANEL: CPT | Mod: HCNC | Performed by: INTERNAL MEDICINE

## 2023-07-12 PROCEDURE — 97530 THERAPEUTIC ACTIVITIES: CPT | Mod: HCNC

## 2023-07-12 PROCEDURE — 99214 OFFICE O/P EST MOD 30 MIN: CPT | Mod: 25,HCNC,, | Performed by: STUDENT IN AN ORGANIZED HEALTH CARE EDUCATION/TRAINING PROGRAM

## 2023-07-12 PROCEDURE — G0378 HOSPITAL OBSERVATION PER HR: HCPCS | Mod: HCNC

## 2023-07-12 PROCEDURE — 93005 ELECTROCARDIOGRAM TRACING: CPT | Mod: 59,HCNC

## 2023-07-12 PROCEDURE — 84484 ASSAY OF TROPONIN QUANT: CPT | Mod: HCNC | Performed by: NURSE PRACTITIONER

## 2023-07-12 PROCEDURE — 96376 TX/PRO/DX INJ SAME DRUG ADON: CPT | Mod: 59

## 2023-07-12 PROCEDURE — 97535 SELF CARE MNGMENT TRAINING: CPT | Mod: HCNC

## 2023-07-12 PROCEDURE — 83735 ASSAY OF MAGNESIUM: CPT | Mod: HCNC | Performed by: INTERNAL MEDICINE

## 2023-07-12 PROCEDURE — 97163 PT EVAL HIGH COMPLEX 45 MIN: CPT | Mod: HCNC

## 2023-07-12 PROCEDURE — 36415 COLL VENOUS BLD VENIPUNCTURE: CPT | Mod: HCNC | Performed by: INTERNAL MEDICINE

## 2023-07-12 RX ORDER — FUROSEMIDE 10 MG/ML
40 INJECTION INTRAMUSCULAR; INTRAVENOUS
Status: COMPLETED | OUTPATIENT
Start: 2023-07-12 | End: 2023-07-12

## 2023-07-12 RX ORDER — TAMSULOSIN HYDROCHLORIDE 0.4 MG/1
0.4 CAPSULE ORAL DAILY
Status: DISCONTINUED | OUTPATIENT
Start: 2023-07-12 | End: 2023-07-14 | Stop reason: HOSPADM

## 2023-07-12 RX ORDER — GABAPENTIN 300 MG/1
300 CAPSULE ORAL 3 TIMES DAILY
Status: DISCONTINUED | OUTPATIENT
Start: 2023-07-13 | End: 2023-07-14 | Stop reason: HOSPADM

## 2023-07-12 RX ORDER — METHOCARBAMOL 750 MG/1
750 TABLET, FILM COATED ORAL 3 TIMES DAILY
Status: DISCONTINUED | OUTPATIENT
Start: 2023-07-12 | End: 2023-07-14 | Stop reason: HOSPADM

## 2023-07-12 RX ORDER — BUPROPION HYDROCHLORIDE 150 MG/1
150 TABLET ORAL DAILY
Status: DISCONTINUED | OUTPATIENT
Start: 2023-07-13 | End: 2023-07-14 | Stop reason: HOSPADM

## 2023-07-12 RX ORDER — OXYCODONE AND ACETAMINOPHEN 7.5; 325 MG/1; MG/1
1 TABLET ORAL EVERY 6 HOURS PRN
Status: DISCONTINUED | OUTPATIENT
Start: 2023-07-12 | End: 2023-07-14 | Stop reason: HOSPADM

## 2023-07-12 RX ORDER — SODIUM CHLORIDE 0.9 % (FLUSH) 0.9 %
10 SYRINGE (ML) INJECTION
Status: DISCONTINUED | OUTPATIENT
Start: 2023-07-12 | End: 2023-07-14 | Stop reason: HOSPADM

## 2023-07-12 RX ORDER — FUROSEMIDE 10 MG/ML
40 INJECTION INTRAMUSCULAR; INTRAVENOUS DAILY
Status: DISCONTINUED | OUTPATIENT
Start: 2023-07-12 | End: 2023-07-12

## 2023-07-12 RX ADMIN — APIXABAN 5 MG: 2.5 TABLET, FILM COATED ORAL at 09:07

## 2023-07-12 RX ADMIN — AMLODIPINE BESYLATE 10 MG: 10 TABLET ORAL at 09:07

## 2023-07-12 RX ADMIN — Medication 6 MG: at 11:07

## 2023-07-12 RX ADMIN — OXYCODONE AND ACETAMINOPHEN 1 TABLET: 7.5; 325 TABLET ORAL at 06:07

## 2023-07-12 RX ADMIN — FUROSEMIDE 40 MG: 10 INJECTION, SOLUTION INTRAMUSCULAR; INTRAVENOUS at 09:07

## 2023-07-12 RX ADMIN — SACUBITRIL AND VALSARTAN 1 TABLET: 49; 51 TABLET, FILM COATED ORAL at 12:07

## 2023-07-12 RX ADMIN — METHOCARBAMOL 750 MG: 750 TABLET ORAL at 09:07

## 2023-07-12 RX ADMIN — ATORVASTATIN CALCIUM 20 MG: 10 TABLET, FILM COATED ORAL at 09:07

## 2023-07-12 RX ADMIN — TAMSULOSIN HYDROCHLORIDE 0.4 MG: 0.4 CAPSULE ORAL at 09:07

## 2023-07-12 RX ADMIN — METHOCARBAMOL 750 MG: 750 TABLET ORAL at 03:07

## 2023-07-12 RX ADMIN — BUSPIRONE HYDROCHLORIDE 15 MG: 10 TABLET ORAL at 09:07

## 2023-07-12 RX ADMIN — RANOLAZINE 1000 MG: 500 TABLET, EXTENDED RELEASE ORAL at 09:07

## 2023-07-12 RX ADMIN — ALPRAZOLAM 0.5 MG: 0.5 TABLET ORAL at 12:07

## 2023-07-12 RX ADMIN — DULOXETINE 30 MG: 30 CAPSULE, DELAYED RELEASE ORAL at 09:07

## 2023-07-12 RX ADMIN — SACUBITRIL AND VALSARTAN 1 TABLET: 49; 51 TABLET, FILM COATED ORAL at 09:07

## 2023-07-12 RX ADMIN — POLYETHYLENE GLYCOL 3350 17 G: 17 POWDER, FOR SOLUTION ORAL at 09:07

## 2023-07-12 RX ADMIN — ALPRAZOLAM 0.5 MG: 0.5 TABLET ORAL at 09:07

## 2023-07-12 NOTE — HOSPITAL COURSE
Bradycardia resolved, BP improved. Started on IV Lasix. Patient continues to report intermittent chest pain that's reproducible as well as LE pain.  PT/OT evaluated patient and recommended HH vs SNF pending progress with acute intervention  UA concerning for UTI with positive nitrite. Hospital course complicated by urinary retention, which may be associated with UTI requiring in and out catheterization x2. Subsequently had atkins placed.  Discussed with patient recommendation to take Buspar dose scheduled rather than as needed and to follow up with psych as she states that she has not seen them in a couple of years.  7/13 NAEON . Atkins in placed . She is  complaining of anxiety and HA . She denies any chest pain or SOB . Medication adjuted . PT/OT rec SNF .  7/14 Pt was seen and examined at bedside . She was determine dot be suitable for d/c   She was admitted with a Dx of generalized weakness , Bradycardia ,UTI , urinary retention and generalized weakness .  Urine cx (+) for e.coli sensitive to rocephin and flouroquinolones . S/P rocephin 1 gr qd x 2 and d/c on cipro 500 mg po bid x 3 days . Cardiology consulted for bradycardia . Pt medication adjusted , Buspar changed to Wellbutrin , Lyrica change to gabapentin  and flexeril change to robaxin .  PT/O rec SNF however pr decided to go home with HH .  Pt will be d/c with a atkins cath due to urinary retention . She will f/u with urology and her PCP in 1 to 2 weeks

## 2023-07-12 NOTE — SUBJECTIVE & OBJECTIVE
Past Medical History:   Diagnosis Date    Cataract     Bilateral    Chronic diastolic congestive heart failure 2020    Dizziness 2023    Encounter for blood transfusion     History of repair of aneurysm of abdominal aorta using endovascular stent graft     DR Bonds    Hx of psychiatric care     Hypertension     Major depressive disorder, single episode, moderate with anxious distress 2020    Malignant neoplasm of upper-outer quadrant of right breast in female, estrogen receptor positive 2019    radiation    Psychiatric problem     Sleep apnea     Sleep difficulties     Stroke 2009    no residual defect    Therapy        Past Surgical History:   Procedure Laterality Date    APPENDECTOMY      AXILLARY NODE DISSECTION Right 2020    Procedure: LYMPHADENECTOMY, AXILLARY;  Surgeon: Artemio Starks MD;  Location: [facility];  Service: General;  Laterality: Right;    BIOPSY OF AXILLARY NODE Right 2021    Procedure: BIOPSY, LYMPH NODE, AXILLARY;  Surgeon: Artemio Sutton MD;  Location: [address];  Service: General;  Laterality: Right;    BREAST BIOPSY      2019    BREAST LUMPECTOMY      2019     SECTION      x 1    OOPHORECTOMY      right     SENTINEL LYMPH NODE BIOPSY Right 2019    Procedure: BIOPSY, LYMPH NODE, SENTINEL;  Surgeon: Artemio Starks MD;  Location: [facility];  Service: General;  Laterality: Right;    splenic artery aneurysm repair      TONSILLECTOMY         Review of patient's allergies indicates:   Allergen Reactions    Pcn [penicillins] Hives       No current facility-administered medications on file prior to encounter.     Current Outpatient Medications on File Prior to Encounter   Medication Sig    amLODIPine (NORVASC) 10 MG tablet Take 1 tablet (10 mg total) by mouth once daily.    apixaban (ELIQUIS) 5 mg Tab Take 1 tablet (5 mg total) by mouth 2 (two) times daily.    atorvastatin (LIPITOR) 20 MG tablet Take 1 tablet (20 mg total) by mouth once  daily.    busPIRone (BUSPAR) 15 MG tablet Take 1 tablet (15 mg total) by mouth 3 (three) times daily as needed (anxiety).    butalbital-acetaminophen-caffeine -40 mg (FIORICET, ESGIC) -40 mg per tablet TAKE 1 TABLET DAILY AS NEEDED FOR PAIN OR HEADACHES.    pregabalin (LYRICA) 100 MG capsule Take 1 capsule (100 mg total) by mouth 2 (two) times daily.    ranolazine (RANEXA) 1,000 mg Tb12 Take 1 tablet (1,000 mg total) by mouth 2 (two) times daily.    sacubitriL-valsartan (ENTRESTO) 49-51 mg per tablet Take 1 tablet by mouth 2 (two) times daily.    tiZANidine (ZANAFLEX) 4 MG tablet Take 1 tablet (4 mg total) by mouth every 6 (six) hours as needed (muscle pain).    zolpidem (AMBIEN) 5 MG Tab Take 1 tablet (5 mg total) by mouth nightly as needed (sleep).    ALPRAZolam (XANAX) 0.5 MG tablet Take 1 tablet (0.5 mg total) by mouth 2 (two) times daily as needed for Anxiety.    butalbital-acetaminophen-caffeine -40 mg (FIORICET, ESGIC) -40 mg per tablet TAKE 1 TABLET DAILY AS NEEDED FOR PAIN OR HEADACHES.    cholecalciferol, vitamin D3, 1,250 mcg (50,000 unit) capsule Take 1 capsule (50,000 Units total) by mouth once a week.    cloNIDine (CATAPRES) 0.1 MG tablet Take 2 tablets (0.2 mg total) by mouth 2 (two) times daily as needed (BP>170/95).    diclofenac sodium (VOLTAREN) 1 % Gel Apply 2 grams topically once daily.    DULoxetine (CYMBALTA) 30 MG capsule Take 1 capsule (30 mg total) by mouth once daily.    furosemide (LASIX) 40 MG tablet Take 1 tablet (40 mg total) by mouth 2 (two) times daily.    letrozole (FEMARA) 2.5 mg Tab Take 1 tablet (2.5 mg total) by mouth once daily.    levocetirizine (XYZAL) 5 MG tablet Take 1 tablet (5 mg total) by mouth every evening.    LIDOcaine (LIDODERM) 5 % Place 2 patches onto the skin once daily. Remove & Discard patch within 12 hours or as directed by MD    multivitamin (THERAGRAN) per tablet Take 1 tablet by mouth once daily.    oxybutynin (DITROPAN) 5 MG Tab Take  1 tablet (5 mg total) by mouth once daily.    oxyCODONE-acetaminophen (PERCOCET) 7.5-325 mg per tablet Take 1 tablet by mouth every 6 (six) hours as needed for Pain.     Family History       Problem Relation (Age of Onset)    Diabetes Maternal Grandmother, Maternal Grandfather, Mother    Hypertension Mother    Sickle cell anemia Daughter          Tobacco Use    Smoking status: Never     Passive exposure: Past    Smokeless tobacco: Never   Substance and Sexual Activity    Alcohol use: No    Drug use: No    Sexual activity: Yes     Partners: Male     Birth control/protection: Post-menopausal     Review of Systems   Constitutional: Positive for malaise/fatigue. Negative for diaphoresis, weight gain and weight loss.   HENT:  Negative for congestion and nosebleeds.    Cardiovascular:  Positive for chest pain, dyspnea on exertion and palpitations. Negative for claudication, cyanosis, irregular heartbeat, leg swelling, near-syncope, orthopnea, paroxysmal nocturnal dyspnea and syncope.   Respiratory:  Negative for cough, hemoptysis, shortness of breath, sleep disturbances due to breathing, snoring, sputum production and wheezing.    Hematologic/Lymphatic: Negative for bleeding problem. Does not bruise/bleed easily.   Skin:  Negative for rash.   Musculoskeletal:  Positive for arthritis. Negative for back pain, falls, joint pain, muscle cramps and muscle weakness.   Gastrointestinal:  Negative for abdominal pain, constipation, diarrhea, heartburn, hematemesis, hematochezia, melena, nausea and vomiting.   Genitourinary:  Negative for dysuria, hematuria and nocturia.   Neurological:  Positive for weakness. Negative for excessive daytime sleepiness, dizziness, headaches, light-headedness, loss of balance, numbness and vertigo.   Objective:     Vital Signs (Most Recent):  Temp: 98 °F (36.7 °C) (07/12/23 1207)  Pulse: 64 (07/12/23 1207)  Resp: 17 (07/12/23 1207)  BP: 139/70 (07/12/23 1207)  SpO2: 99 % (07/12/23 1207) Vital Signs  (24h Range):  Temp:  [98 °F (36.7 °C)] 98 °F (36.7 °C)  Pulse:  [48-70] 64  Resp:  [15-28] 17  SpO2:  [97 %-100 %] 99 %  BP: (112-155)/(59-86) 139/70     Weight: (!) 145.6 kg (320 lb 15.8 oz)  Body mass index is 47.4 kg/m².    SpO2: 99 %         Intake/Output Summary (Last 24 hours) at 7/12/2023 1432  Last data filed at 7/12/2023 1156  Gross per 24 hour   Intake --   Output 2825 ml   Net -2825 ml       Lines/Drains/Airways       Drain  Duration                  Closed/Suction Drain 03/02/21 1625 Right Breast Bulb 19 Fr. 861 days         Urethral Catheter 07/12/23 0755 Non-latex;Silicone 16 Fr. <1 day              Peripheral Intravenous Line  Duration                  Peripheral IV - Single Lumen 07/11/23 1320 20 G Anterior;Left;Proximal Forearm 1 day                     Physical Exam     Significant Labs: BMP:   Recent Labs   Lab 07/11/23  1317 07/12/23  0700    91    143   K 4.2 4.4   * 112*   CO2 24 21*   BUN 28* 23   CREATININE 1.5* 1.3   CALCIUM 8.9 9.2   MG  --  2.1   , CMP   Recent Labs   Lab 07/11/23  1317 07/12/23  0700    143   K 4.2 4.4   * 112*   CO2 24 21*    91   BUN 28* 23   CREATININE 1.5* 1.3   CALCIUM 8.9 9.2   PROT 6.5 6.8   ALBUMIN 3.1* 3.2*   BILITOT 0.2 0.3   ALKPHOS 87 96   AST 12 15   ALT 9* 12   ANIONGAP 9 10   , CBC   Recent Labs   Lab 07/11/23  1317 07/12/23  0700   WBC 6.89 7.85   HGB 10.1* 11.6*   HCT 32.7* 37.8    183   , INR No results for input(s): INR, PROTIME in the last 48 hours., Lipid Panel No results for input(s): CHOL, HDL, LDLCALC, TRIG, CHOLHDL in the last 48 hours., and Troponin   Recent Labs   Lab 07/11/23  1317 07/11/23  1714 07/12/23  0304   TROPONINI 0.013 0.024 0.025       Significant Imaging: Echocardiogram: 2D echo with color flow doppler: No results found. However, due to the size of the patient record, not all encounters were searched. Please check Results Review for a complete set of results. and Transthoracic echo (TTE)  complete (Cupid Only):   Results for orders placed or performed during the hospital encounter of 03/09/23   Echo   Result Value Ref Range    BSA 2.58 m2    TDI SEPTAL 0.07 m/s    LV LATERAL E/E' RATIO 5.89 m/s    LV SEPTAL E/E' RATIO 7.57 m/s    LA WIDTH 4.20 cm    IVC diameter 1.5 cm    Left Ventricular Outflow Tract Mean Velocity 1.02 cm/s    Left Ventricular Outflow Tract Mean Gradient 4.30 mmHg    TV mean gradient 19 mmHg    TDI LATERAL 0.09 m/s    LVIDd 4.41 3.5 - 6.0 cm    IVS 1.60 (A) 0.6 - 1.1 cm    Posterior Wall 1.49 (A) 0.6 - 1.1 cm    Ao root annulus 3.15 cm    LVIDs 2.57 2.1 - 4.0 cm    FS 42 28 - 44 %    LA volume 67.89 cm3    STJ 2.94 cm    Ascending aorta 2.67 cm    LV mass 280.24 g    LA size 3.64 cm    TAPSE 2.00 cm    Left Ventricle Relative Wall Thickness 0.68 cm    AV mean gradient 6 mmHg    AV valve area 2.86 cm2    AV Velocity Ratio 0.79     AV index (prosthetic) 0.91     MV valve area p 1/2 method 2.79 cm2    E/A ratio 0.82     Mean e' 0.08 m/s    E wave deceleration time 272.20 msec    IVRT 76.12 msec    LVOT diameter 2.00 cm    LVOT area 3.1 cm2    LVOT peak henry 1.19 m/s    LVOT peak VTI 28.80 cm    Ao peak henry 1.50 m/s    Ao VTI 31.6 cm    RVOT peak henry 1.04 m/s    RVOT peak VTI 25.1 cm    LVOT stroke volume 90.43 cm3    AV peak gradient 9 mmHg    PV mean gradient 2.75 mmHg    E/E' ratio 6.63 m/s    MV Peak E Henry 0.53 m/s    TR Max Henry 2.60 m/s    MV stenosis pressure 1/2 time 78.94 ms    MV Peak A Henry 0.65 m/s    LV Systolic Volume 23.99 mL    LV Systolic Volume Index 9.8 mL/m2    LV Diastolic Volume 87.94 mL    LV Diastolic Volume Index 35.75 mL/m2    LA Volume Index 27.6 mL/m2    LV Mass Index 114 g/m2    RA Major Axis 3.89 cm    Left Atrium Minor Axis 5.40 cm    Left Atrium Major Axis 5.06 cm    Triscuspid Valve Regurgitation Peak Gradient 27 mmHg    RA Width 3.00 cm    Right Atrial Pressure (from IVC) 3 mmHg    EF 65 %    TV rest pulmonary artery pressure 30 mmHg    Narrative    · The  left ventricle is normal in size with concentric hypertrophy and   normal systolic function.  · The estimated ejection fraction is 65%.  · Normal left ventricular diastolic function.  · Normal right ventricular size with normal right ventricular systolic   function.  · Normal central venous pressure (3 mmHg).  · The estimated PA systolic pressure is 30 mmHg.

## 2023-07-12 NOTE — ASSESSMENT & PLAN NOTE
Patient has persistent depression which iscurrently uncontrolled.  She admits that she has not been taking her prescribed BuSpar as scheduled as instructed, has been taking it p.r.n..  Will Continue anti-depressant medications. We will not consult psychiatry at this time. Patient does not display psychosis at this time. Continue to monitor closely and adjust plan of care as needed.    Continue BuSpar to be taken as prescribed   PDMP reviewed, continue home Xanax as needed  Discussed with patient that she should follow up with psych after discharge as she had not seen them in a couple of years, patient stated agreement

## 2023-07-12 NOTE — ASSESSMENT & PLAN NOTE
Patient is identified as having Diastolic (HFpEF) heart failure that is Acute on chronic. CHF is currently controlled. Latest ECHO performed and demonstrates- Results for orders placed during the hospital encounter of 03/09/23    Echo    Interpretation Summary  · The left ventricle is normal in size with concentric hypertrophy and normal systolic function.  · The estimated ejection fraction is 65%.  · Normal left ventricular diastolic function.  · Normal right ventricular size with normal right ventricular systolic function.  · Normal central venous pressure (3 mmHg).  · The estimated PA systolic pressure is 30 mmHg.  . Continue Nitrate/Vasodilator and monitor clinical status closely. Monitor on telemetry. Patient is on CHF pathway.  Monitor strict Is&Os and daily weights.  Place on fluid restriction of 1.5 L. Cardiology has been consulted. Continue to stress to patient importance of self efficacy and  on diet for CHF. Last BNP reviewed- and noted below   Recent Labs   Lab 07/11/23  1317   *     Continue IV laisx for 1 more dose  Then oral torsemide 20 mg daily  Continue entresto, amlodipine   Monitor strict I/Os

## 2023-07-12 NOTE — ASSESSMENT & PLAN NOTE
Patient was noted to have bradycardia with heart rate in the 40s on arrival  She is not on any beta-blocker or any other meds that would cause AV jena blockage  Now resolved  Cardiac monitoring  Cardiology consulted  Etiology unclear

## 2023-07-12 NOTE — SUBJECTIVE & OBJECTIVE
Interval History: Continued to have urinary retention overnight.  Seen and examined without any family present, PT/OT at bedside. Continues to report LE pain & intermittent chest pain, as well as RUE /shoulder pain. Denies any other complaints at this time. Cardiology recommended continuing IV Lasix for 1 more dose prior to transitioning pt to torsemide.      Review of Systems  Objective:     Vital Signs (Most Recent):  Temp: 98 °F (36.7 °C) (07/12/23 1207)  Pulse: 64 (07/12/23 1207)  Resp: 17 (07/12/23 1207)  BP: 139/70 (07/12/23 1207)  SpO2: 99 % (07/12/23 1207) Vital Signs (24h Range):  Temp:  [98 °F (36.7 °C)] 98 °F (36.7 °C)  Pulse:  [55-70] 64  Resp:  [15-28] 17  SpO2:  [97 %-100 %] 99 %  BP: (112-155)/(59-86) 139/70     Weight: (!) 145.6 kg (320 lb 15.8 oz)  Body mass index is 47.4 kg/m².    Intake/Output Summary (Last 24 hours) at 7/12/2023 1526  Last data filed at 7/12/2023 1156  Gross per 24 hour   Intake --   Output 2825 ml   Net -2825 ml         Physical Exam  Vitals and nursing note reviewed.   Constitutional:       General: She is not in acute distress.     Appearance: She is morbidly obese. She is not ill-appearing.   HENT:      Head: Normocephalic and atraumatic.      Right Ear: External ear normal.      Left Ear: External ear normal.   Eyes:      Pupils: Pupils are equal, round, and reactive to light.   Cardiovascular:      Rate and Rhythm: Normal rate and regular rhythm.   Pulmonary:      Effort: Pulmonary effort is normal. No accessory muscle usage or respiratory distress.      Breath sounds: No wheezing.   Abdominal:      General: There is no distension.      Palpations: Abdomen is soft.      Tenderness: There is no abdominal tenderness. There is no guarding or rebound.   Musculoskeletal:      Right shoulder: Tenderness present.      Cervical back: Neck supple.      Right lower leg: Edema present.      Left lower leg: Edema present.   Skin:     General: Skin is warm and dry.   Neurological:       Mental Status: She is alert and oriented to person, place, and time. Mental status is at baseline.   Psychiatric:         Mood and Affect: Mood is anxious.           Significant Labs: All pertinent labs within the past 24 hours have been reviewed.  CBC:   Recent Labs   Lab 07/11/23  1317 07/12/23  0700   WBC 6.89 7.85   HGB 10.1* 11.6*   HCT 32.7* 37.8    183     CMP:   Recent Labs   Lab 07/11/23  1317 07/12/23  0700    143   K 4.2 4.4   * 112*   CO2 24 21*    91   BUN 28* 23   CREATININE 1.5* 1.3   CALCIUM 8.9 9.2   PROT 6.5 6.8   ALBUMIN 3.1* 3.2*   BILITOT 0.2 0.3   ALKPHOS 87 96   AST 12 15   ALT 9* 12   ANIONGAP 9 10       Significant Imaging: I have reviewed all pertinent imaging results/findings within the past 24 hours.

## 2023-07-12 NOTE — ASSESSMENT & PLAN NOTE
Developed sudden-onset severe localized chest pain shortly after admission  Chest pain reproducible on examination, patient also notably anxious and was tearful  Repeat EKG shows sinus bradycardia with no acute ischemic changes  Serial trop negative  Likely etiology musculoskeletal and possibly worsened by anxiety  Changed robaxin dose to scheduled  Started on gabapentin by cardiology

## 2023-07-12 NOTE — ASSESSMENT & PLAN NOTE
Renal function appears to be slightly worse above baseline on admission  Improving, near baseline  Strict I&Os  Avoid nephrotoxins and renal dose meds as needed  Monitor renal function

## 2023-07-12 NOTE — PLAN OF CARE
Pt AAOx4. Requires max assist with mobility. Puckett cath in place, draining clear yellow urine. Anxious mood @ this time. Heart monitor 8556. RN expressed hourly rounding and purpose for it. All active orders reviewed. Chart check complete.

## 2023-07-12 NOTE — ASSESSMENT & PLAN NOTE
Reports that she is not using CPAP currently after her device was recalled  Monitor respiratory status  Nocturnal O2 as needed

## 2023-07-12 NOTE — PT/OT/SLP EVAL
"Physical Therapy Evaluation    Patient Name:  Susu Luther   MRN:  [id number]    Recommendations:     Discharge Recommendations: nursing facility, skilled, home health PT (DEPENDING ON PROGRES WITH POC)   Discharge Equipment Recommendations: walker, rolling   Barriers to discharge: None    Assessment:     Susu Luther is a 73 y.o. female admitted with a medical diagnosis of Bradycardia.  She presents with the following impairments/functional limitations: weakness, impaired endurance, impaired functional mobility, gait instability, impaired balance, pain, decreased safety awareness, decreased lower extremity function, decreased coordination, edema, decreased upper extremity function.    Rehab Prognosis: Fair; patient would benefit from acute skilled PT services to address these deficits and reach maximum level of function.    Recent Surgery: * No surgery found *     Plan:     During this hospitalization, patient to be seen 3 x/week to address the identified rehab impairments via gait training, therapeutic activities, therapeutic exercises and progress toward the following goals:    Plan of Care Expires:  07/26/23    Subjective     Chief Complaint: PAIN, "I CAN'T GO TO THE BATHROOM"  C/O NO SLEEP  Patient/Family Comments/goals:   Pain/Comfort:  Pain Rating 1: 10/10  Location - Side 1: Right  Location - Orientation 1: generalized  Location 1: shoulder  Pain Addressed 1: Reposition, Distraction  Pain Rating 2: 10/10  Location - Side 2: Bilateral  Location - Orientation 2: generalized  Location 2: leg  Pain Addressed 2: Reposition, Distraction    Patients cultural, spiritual, Sabianism conflicts given the current situation:      Living Environment:  PT LIVES WITH  1 STORY HOUSE 1 STEP TO ENTER, PT AMB MOSTLY HOUSEHOLD DISTANCES NO AD, USES ROLLATOR AS NEEDED, DRIVES MINIMALLY INDEP WITH ADL'S  Prior to admission, patients level of function was SERVANDO.  Equipment used at home: rollator, bath bench, cane, " quad, grab bar, bedside commode.  DME owned (not currently used): none.  Upon discharge, patient will have assistance from .    Objective:     Communicated with NURSE OLEKSANDR prior to session.  Patient found supine with telemetry, peripheral IV, pulse ox (continuous), blood pressure cuff, DIAZ upon PT entry to room.    General Precautions: Standard, fall  Orthopedic Precautions:N/A   Braces: N/A  Respiratory Status: Room air    Exams:  Cognitive Exam:  Patient is oriented to Person, Place, Time, and Situation  Postural Exam:  Patient presented with the following abnormalities:    -       Rounded shoulders  Sensation:    -       Impaired  BLE  Skin Integrity/Edema:      -       Edema: Moderate BLE  RLE ROM: WFL  RLE Strength: GROSSLY 3+/5  LLE ROM: WFL  LLE Strength: GROSSLY 3+/5    Functional Mobility:  Bed Mobility:     Rolling Left:  moderate assistance  Rolling Right: moderate assistance  Scooting: moderate assistance  Supine to Sit: moderate assistance and of 2 persons-EXTRA TIME, INCREASED DIFFICULTY LEVEL DUE TO MOBILITY ON STRETCHER IN ER  Sit to Supine: moderate assistance and of 2 persons  Transfers:     Sit to Stand:  moderate assistance and of 2 persons with no AD and hand-held assist  Gait: PT TOOK APPROX. 4 SMALL SIDE STEPS WITH MODA X 2/HAND HELD ASSIST X 2, QUICK TO FATIGUE, LIMITED BY PAIN, CUES FOR UPRIGHT POSTURE  Balance: POOR+ SITTING BALANCE, POOR DYNAMIC BALANCE DURING SIDE STEPPING  BP:161/79 IN SUPINE    AM-PAC 6 CLICK MOBILITY  Total Score:11     Treatment & Education:  PT EDUCATION:  - ROLE OF P.T. AND POC IN ACUTE CARE HOSPITAL SETTING  - RW USE AND SAFETY DURING TF'S AND GAIT  - IMPORTANCE OF ACTIVITY PACING  - ENCOURAGED TO INCREASE TIME OOB IN CHAIR TO TOLERANCE   - EDUCATED IN AND ENCOURAGED TO CONTINUE THERAPUETIC EXERCISES THROUGHOUT THE DAY TO INCREASE ACTIVITY TOLERANCE AND DECREASE RISK FOR PNEUMONIA AND BLOOD CLOTS: HIP FLEX/EXT, HIP ABD/ADD, QUAD SET, HEEL SLIDE, AP  - RISK  "FOR FALLS DUE TO GENERALIZED WEAKNESS, EDUCATED ON "CALL DON'T FALL", ENCOURAGED TO CALL FOR ASSISTANCE WITH ALL NEEDS SUCH AS BED<>CHAIR TRANSFERS OR TRIPS TO BATHROOM, PT AGREEABLE TO SAFETY PRECAUTIONS    Patient left HOB elevated with all lines intact, call button in reach, and NURSE notified.    GOALS:   Multidisciplinary Problems       Physical Therapy Goals          Problem: Physical Therapy    Goal Priority Disciplines Outcome Goal Variances Interventions   Physical Therapy Goal     PT, PT/OT      Description: LTG'S TO BE MET IN 14 DAYS (-)  PT WILL REQUIRE CGA FOR BED MOBILITY  PT WILL REQUIRE CGA FOR BED<>CHAIR TF'S  PT WILL  FEET WITH RW AND CGA                         History:     Past Medical History:   Diagnosis Date    Cataract     Bilateral    Chronic diastolic congestive heart failure 2020    Dizziness 2023    Encounter for blood transfusion     History of repair of aneurysm of abdominal aorta using endovascular stent graft     DR Bonds    Hx of psychiatric care     Hypertension     Major depressive disorder, single episode, moderate with anxious distress 2020    Malignant neoplasm of upper-outer quadrant of right breast in female, estrogen receptor positive 2019    radiation    Psychiatric problem     Sleep apnea     Sleep difficulties     Stroke     no residual defect    Therapy        Past Surgical History:   Procedure Laterality Date    APPENDECTOMY      AXILLARY NODE DISSECTION Right 2020    Procedure: LYMPHADENECTOMY, AXILLARY;  Surgeon: Artemio Starks MD;  Location: [facility] OR;  Service: General;  Laterality: Right;    BIOPSY OF AXILLARY NODE Right 2021    Procedure: BIOPSY, LYMPH NODE, AXILLARY;  Surgeon: Artemio Sutton MD;  Location: [address];  Service: General;  Laterality: Right;    BREAST BIOPSY          BREAST LUMPECTOMY      2019     SECTION      x 1    OOPHORECTOMY      right     SENTINEL LYMPH NODE BIOPSY " Right 03/27/2019    Procedure: BIOPSY, LYMPH NODE, SENTINEL;  Surgeon: Artemio Starks MD;  Location: [facility];  Service: General;  Laterality: Right;    splenic artery aneurysm repair      TONSILLECTOMY         Time Tracking:     PT Received On: 07/12/23  PT Start Time: 0810     PT Stop Time: 0850  PT Total Time (min): 40 min     Billable Minutes: Evaluation 15 and Therapeutic Activity 25    07/12/2023

## 2023-07-12 NOTE — PLAN OF CARE
P.T. EVAL COMPLETE.  PT CURRENTLY REQUIRES MODA X 2 FOR BED MOBILITY AND TF.  P.T. RECOMMENDS SNF VS HHPT AT D/C DEPENDING ON PROGRESS WITH POC

## 2023-07-12 NOTE — ASSESSMENT & PLAN NOTE
Patient noted to have urinary retention in the ED with bladder scan showing > 690 mL , straight cath ordered  Per chart review, she had urinary retention during her last hospital admission   Required in and out catheterization x2  Stop oxybutynin  Puckett catheter placed this morning, patient started on Flomax  Bladder training  Referral to urology outpatient

## 2023-07-12 NOTE — HPI
Ms. Luther is a 72y/o AA female with PMHx of diastolic HF, CVA, HLD, breast CA, hx of recurrent PE, AAA s/p repair, HTN, anxiety,  TANNER, NILS, obesity who presented to [facility] ED c/o chest pain and recurrent falls. She does not ambulate regularly with her walker at home. Takes eliquis on a regular basis. Believes chest pain has been constant and worsening. Also reports worsening swelling in her feet not resolving with home lasix.   In the ED, tele showing episodes of sinus bradycardia which patient has experienced in the past. Per last cardiololgy note, shouold not be taking coreg.   Denies syncope.  EKG with NSR, LAFB  Troponin negative    Started on IV lasix   Echo Mar 23' EF nml  Nuclear stress Jan 23' negative for ischemia

## 2023-07-12 NOTE — CONSULTS
[facility]  Cardiology  Consult Note    Patient Name: Susu Luther  MRN: [id number]  Admission Date: 7/11/2023  Hospital Length of Stay: 0 days  Code Status: Full Code   Attending Provider: Marianne Louise DO   Consulting Provider: Arlene Hobbs MD  Primary Care Physician: Alexus Casas NP  Principal Problem:Bradycardia    Patient information was obtained from patient and ER records.     Inpatient consult to Cardiology  Consult performed by: Arlene Hobbs MD  Consult ordered by: Marianne Louise DO  Reason for consult: chest pain        Subjective:     Chief Complaint:  Chest pain     HPI:   Ms. Luther is a 72y/o AA female with PMHx of diastolic HF, CVA, HLD, breast CA, hx of recurrent PE, AAA s/p repair, HTN, anxiety,  TANNER, NILS, obesity who presented to [facility] ED c/o chest pain and recurrent falls. She does not ambulate regularly with her walker at home. Takes eliquis on a regular basis. Believes chest pain has been constant and worsening. Also reports worsening swelling in her feet not resolving with home lasix.   In the ED, tele showing episodes of sinus bradycardia which patient has experienced in the past. Per last cardiololgy note, shouold not be taking coreg.   Denies syncope.  EKG with NSR, LAFB  Troponin negative    Started on IV lasix   Echo Mar 23' EF nml  Nuclear stress Jan 23' negative for ischemia                  Past Medical History:   Diagnosis Date    Cataract     Bilateral    Chronic diastolic congestive heart failure 08/25/2020    Dizziness 03/12/2023    Encounter for blood transfusion     History of repair of aneurysm of abdominal aorta using endovascular stent graft     DR Bonds    Hx of psychiatric care     Hypertension     Major depressive disorder, single episode, moderate with anxious distress 01/14/2020    Malignant neoplasm of upper-outer quadrant of right breast in female, estrogen receptor positive 03/14/2019    radiation    Psychiatric problem      Sleep apnea     Sleep difficulties     Stroke 2009    no residual defect    Therapy        Past Surgical History:   Procedure Laterality Date    APPENDECTOMY      AXILLARY NODE DISSECTION Right 2020    Procedure: LYMPHADENECTOMY, AXILLARY;  Surgeon: Artemio Starks MD;  Location: [facility] OR;  Service: General;  Laterality: Right;    BIOPSY OF AXILLARY NODE Right 2021    Procedure: BIOPSY, LYMPH NODE, AXILLARY;  Surgeon: Artemio Sutton MD;  Location: [address];  Service: General;  Laterality: Right;    BREAST BIOPSY          BREAST LUMPECTOMY      2019     SECTION      x 1    OOPHORECTOMY      right     SENTINEL LYMPH NODE BIOPSY Right 2019    Procedure: BIOPSY, LYMPH NODE, SENTINEL;  Surgeon: Artemio Starks MD;  Location: [facility] OR;  Service: General;  Laterality: Right;    splenic artery aneurysm repair      TONSILLECTOMY         Review of patient's allergies indicates:   Allergen Reactions    Pcn [penicillins] Hives       No current facility-administered medications on file prior to encounter.     Current Outpatient Medications on File Prior to Encounter   Medication Sig    amLODIPine (NORVASC) 10 MG tablet Take 1 tablet (10 mg total) by mouth once daily.    apixaban (ELIQUIS) 5 mg Tab Take 1 tablet (5 mg total) by mouth 2 (two) times daily.    atorvastatin (LIPITOR) 20 MG tablet Take 1 tablet (20 mg total) by mouth once daily.    busPIRone (BUSPAR) 15 MG tablet Take 1 tablet (15 mg total) by mouth 3 (three) times daily as needed (anxiety).    butalbital-acetaminophen-caffeine -40 mg (FIORICET, ESGIC) -40 mg per tablet TAKE 1 TABLET DAILY AS NEEDED FOR PAIN OR HEADACHES.    pregabalin (LYRICA) 100 MG capsule Take 1 capsule (100 mg total) by mouth 2 (two) times daily.    ranolazine (RANEXA) 1,000 mg Tb12 Take 1 tablet (1,000 mg total) by mouth 2 (two) times daily.    sacubitriL-valsartan (ENTRESTO) 49-51 mg per tablet Take 1 tablet by  mouth 2 (two) times daily.    tiZANidine (ZANAFLEX) 4 MG tablet Take 1 tablet (4 mg total) by mouth every 6 (six) hours as needed (muscle pain).    zolpidem (AMBIEN) 5 MG Tab Take 1 tablet (5 mg total) by mouth nightly as needed (sleep).    ALPRAZolam (XANAX) 0.5 MG tablet Take 1 tablet (0.5 mg total) by mouth 2 (two) times daily as needed for Anxiety.    butalbital-acetaminophen-caffeine -40 mg (FIORICET, ESGIC) -40 mg per tablet TAKE 1 TABLET DAILY AS NEEDED FOR PAIN OR HEADACHES.    cholecalciferol, vitamin D3, 1,250 mcg (50,000 unit) capsule Take 1 capsule (50,000 Units total) by mouth once a week.    cloNIDine (CATAPRES) 0.1 MG tablet Take 2 tablets (0.2 mg total) by mouth 2 (two) times daily as needed (BP>170/95).    diclofenac sodium (VOLTAREN) 1 % Gel Apply 2 grams topically once daily.    DULoxetine (CYMBALTA) 30 MG capsule Take 1 capsule (30 mg total) by mouth once daily.    furosemide (LASIX) 40 MG tablet Take 1 tablet (40 mg total) by mouth 2 (two) times daily.    letrozole (FEMARA) 2.5 mg Tab Take 1 tablet (2.5 mg total) by mouth once daily.    levocetirizine (XYZAL) 5 MG tablet Take 1 tablet (5 mg total) by mouth every evening.    LIDOcaine (LIDODERM) 5 % Place 2 patches onto the skin once daily. Remove & Discard patch within 12 hours or as directed by MD    multivitamin (THERAGRAN) per tablet Take 1 tablet by mouth once daily.    oxybutynin (DITROPAN) 5 MG Tab Take 1 tablet (5 mg total) by mouth once daily.    oxyCODONE-acetaminophen (PERCOCET) 7.5-325 mg per tablet Take 1 tablet by mouth every 6 (six) hours as needed for Pain.     Family History       Problem Relation (Age of Onset)    Diabetes Maternal Grandmother, Maternal Grandfather, Mother    Hypertension Mother    Sickle cell anemia Daughter          Tobacco Use    Smoking status: Never     Passive exposure: Past    Smokeless tobacco: Never   Substance and Sexual Activity    Alcohol use: No    Drug use: No     Sexual activity: Yes     Partners: Male     Birth control/protection: Post-menopausal     Review of Systems   Constitutional: Positive for malaise/fatigue. Negative for diaphoresis, weight gain and weight loss.   HENT:  Negative for congestion and nosebleeds.    Cardiovascular:  Positive for chest pain, dyspnea on exertion and palpitations. Negative for claudication, cyanosis, irregular heartbeat, leg swelling, near-syncope, orthopnea, paroxysmal nocturnal dyspnea and syncope.   Respiratory:  Negative for cough, hemoptysis, shortness of breath, sleep disturbances due to breathing, snoring, sputum production and wheezing.    Hematologic/Lymphatic: Negative for bleeding problem. Does not bruise/bleed easily.   Skin:  Negative for rash.   Musculoskeletal:  Positive for arthritis. Negative for back pain, falls, joint pain, muscle cramps and muscle weakness.   Gastrointestinal:  Negative for abdominal pain, constipation, diarrhea, heartburn, hematemesis, hematochezia, melena, nausea and vomiting.   Genitourinary:  Negative for dysuria, hematuria and nocturia.   Neurological:  Positive for weakness. Negative for excessive daytime sleepiness, dizziness, headaches, light-headedness, loss of balance, numbness and vertigo.   Objective:     Vital Signs (Most Recent):  Temp: 98 °F (36.7 °C) (07/12/23 1207)  Pulse: 64 (07/12/23 1207)  Resp: 17 (07/12/23 1207)  BP: 139/70 (07/12/23 1207)  SpO2: 99 % (07/12/23 1207) Vital Signs (24h Range):  Temp:  [98 °F (36.7 °C)] 98 °F (36.7 °C)  Pulse:  [48-70] 64  Resp:  [15-28] 17  SpO2:  [97 %-100 %] 99 %  BP: (112-155)/(59-86) 139/70     Weight: (!) 145.6 kg (320 lb 15.8 oz)  Body mass index is 47.4 kg/m².    SpO2: 99 %         Intake/Output Summary (Last 24 hours) at 7/12/2023 1432  Last data filed at 7/12/2023 1156  Gross per 24 hour   Intake --   Output 2825 ml   Net -2825 ml       Lines/Drains/Airways       Drain  Duration                  Closed/Suction Drain 03/02/21 1625 Right  Breast Bulb 19 Fr. 861 days         Urethral Catheter 07/12/23 0755 Non-latex;Silicone 16 Fr. <1 day              Peripheral Intravenous Line  Duration                  Peripheral IV - Single Lumen 07/11/23 1320 20 G Anterior;Left;Proximal Forearm 1 day                     Physical Exam     Significant Labs: BMP:   Recent Labs   Lab 07/11/23  1317 07/12/23  0700    91    143   K 4.2 4.4   * 112*   CO2 24 21*   BUN 28* 23   CREATININE 1.5* 1.3   CALCIUM 8.9 9.2   MG  --  2.1   , CMP   Recent Labs   Lab 07/11/23  1317 07/12/23  0700    143   K 4.2 4.4   * 112*   CO2 24 21*    91   BUN 28* 23   CREATININE 1.5* 1.3   CALCIUM 8.9 9.2   PROT 6.5 6.8   ALBUMIN 3.1* 3.2*   BILITOT 0.2 0.3   ALKPHOS 87 96   AST 12 15   ALT 9* 12   ANIONGAP 9 10   , CBC   Recent Labs   Lab 07/11/23  1317 07/12/23  0700   WBC 6.89 7.85   HGB 10.1* 11.6*   HCT 32.7* 37.8    183   , INR No results for input(s): INR, PROTIME in the last 48 hours., Lipid Panel No results for input(s): CHOL, HDL, LDLCALC, TRIG, CHOLHDL in the last 48 hours., and Troponin   Recent Labs   Lab 07/11/23  1317 07/11/23  1714 07/12/23  0304   TROPONINI 0.013 0.024 0.025       Significant Imaging: Echocardiogram: 2D echo with color flow doppler: No results found. However, due to the size of the patient record, not all encounters were searched. Please check Results Review for a complete set of results. and Transthoracic echo (TTE) complete (Cupid Only):   Results for orders placed or performed during the hospital encounter of 03/09/23   Echo   Result Value Ref Range    BSA 2.58 m2    TDI SEPTAL 0.07 m/s    LV LATERAL E/E' RATIO 5.89 m/s    LV SEPTAL E/E' RATIO 7.57 m/s    LA WIDTH 4.20 cm    IVC diameter 1.5 cm    Left Ventricular Outflow Tract Mean Velocity 1.02 cm/s    Left Ventricular Outflow Tract Mean Gradient 4.30 mmHg    TV mean gradient 19 mmHg    TDI LATERAL 0.09 m/s    LVIDd 4.41 3.5 - 6.0 cm    IVS 1.60 (A) 0.6 - 1.1  cm    Posterior Wall 1.49 (A) 0.6 - 1.1 cm    Ao root annulus 3.15 cm    LVIDs 2.57 2.1 - 4.0 cm    FS 42 28 - 44 %    LA volume 67.89 cm3    STJ 2.94 cm    Ascending aorta 2.67 cm    LV mass 280.24 g    LA size 3.64 cm    TAPSE 2.00 cm    Left Ventricle Relative Wall Thickness 0.68 cm    AV mean gradient 6 mmHg    AV valve area 2.86 cm2    AV Velocity Ratio 0.79     AV index (prosthetic) 0.91     MV valve area p 1/2 method 2.79 cm2    E/A ratio 0.82     Mean e' 0.08 m/s    E wave deceleration time 272.20 msec    IVRT 76.12 msec    LVOT diameter 2.00 cm    LVOT area 3.1 cm2    LVOT peak henry 1.19 m/s    LVOT peak VTI 28.80 cm    Ao peak henry 1.50 m/s    Ao VTI 31.6 cm    RVOT peak henry 1.04 m/s    RVOT peak VTI 25.1 cm    LVOT stroke volume 90.43 cm3    AV peak gradient 9 mmHg    PV mean gradient 2.75 mmHg    E/E' ratio 6.63 m/s    MV Peak E Henry 0.53 m/s    TR Max Henry 2.60 m/s    MV stenosis pressure 1/2 time 78.94 ms    MV Peak A Henry 0.65 m/s    LV Systolic Volume 23.99 mL    LV Systolic Volume Index 9.8 mL/m2    LV Diastolic Volume 87.94 mL    LV Diastolic Volume Index 35.75 mL/m2    LA Volume Index 27.6 mL/m2    LV Mass Index 114 g/m2    RA Major Axis 3.89 cm    Left Atrium Minor Axis 5.40 cm    Left Atrium Major Axis 5.06 cm    Triscuspid Valve Regurgitation Peak Gradient 27 mmHg    RA Width 3.00 cm    Right Atrial Pressure (from IVC) 3 mmHg    EF 65 %    TV rest pulmonary artery pressure 30 mmHg    Narrative    · The left ventricle is normal in size with concentric hypertrophy and   normal systolic function.  · The estimated ejection fraction is 65%.  · Normal left ventricular diastolic function.  · Normal right ventricular size with normal right ventricular systolic   function.  · Normal central venous pressure (3 mmHg).  · The estimated PA systolic pressure is 30 mmHg.        Assessment and Plan:     Frequent falls  Counseled on using walker regularly to ambulate     Chronic diastolic congestive heart  failure  Patient is identified as having Diastolic (HFpEF) heart failure that is Acute on chronic. CHF is currently controlled. Latest ECHO performed and demonstrates- Results for orders placed during the hospital encounter of 03/09/23    Echo    Interpretation Summary  · The left ventricle is normal in size with concentric hypertrophy and normal systolic function.  · The estimated ejection fraction is 65%.  · Normal left ventricular diastolic function.  · Normal right ventricular size with normal right ventricular systolic function.  · Normal central venous pressure (3 mmHg).  · The estimated PA systolic pressure is 30 mmHg.  . Continue Nitrate/Vasodilator and monitor clinical status closely. Monitor on telemetry. Patient is on CHF pathway.  Monitor strict Is&Os and daily weights.  Place on fluid restriction of 1.5 L. Cardiology has been consulted. Continue to stress to patient importance of self efficacy and  on diet for CHF. Last BNP reviewed- and noted below   Recent Labs   Lab 07/11/23  1317   *     Continue IV laisx for 1 more dose  Then oral torsemide 20 mg daily  Continue entresto, amlodipine   Monitor strict I/Os    History of pulmonary embolism  Continue eliquis    Other chest pain  Atypical  All over pain  Will start gabapentin (has helped pain in the past)    Major depressive disorder, recurrent episode, moderate with anxious distress  Will stop buspar  Will start wellbutrin     Hypertension  Stable  Continue home medications      Malignant neoplasm of upper-outer quadrant of right breast in female, estrogen receptor positive  F/u with Hem/onc        VTE Risk Mitigation (From admission, onward)         Ordered     apixaban tablet 5 mg  2 times daily         07/11/23 4920     Reason for No Pharmacological VTE Prophylaxis  Once        Question:  Reasons:  Answer:  Already adequately anticoagulated on oral Anticoagulants    07/11/23 1512     IP VTE HIGH RISK PATIENT  Once         07/11/23 1512      Place sequential compression device  Until discontinued         07/11/23 4186                Thank you for your consult. I will follow-up with patient. Please contact us if you have any additional questions.    Arlene Hobbs MD  Cardiology   [facility]

## 2023-07-12 NOTE — PROGRESS NOTES
[facility] Medicine  Progress Note    Patient Name: Susu Luther  MRN: [id number]  Patient Class: OP- Observation   Admission Date: 7/11/2023  Length of Stay: 0 days  Attending Physician: Marianne Louise DO  Primary Care Provider: Alexus Casas NP        Subjective:     Principal Problem:Bradycardia        HPI:  Susu Luther is a 73 y.o. female with Chronic diastolic congestive heart failure, History of repair of aneurysm of abdominal aorta using endovascular stent graft, psychiatric care, Hypertension, Major depressive disorder, Malignant neoplasm of upper-outer quadrant of right breast in female, estrogen receptor positive, Sleep apnea, Stroke  who presented to ED on 7/11/2023 with weakness.  Patient reports that she was feeling extremely short of breath this morning, subsequently a friend called EMS.  Additionally she has been having a lot of falls at home including last Saturday.  Reports that she fell onto her right shoulder and since then has been having a lot of pain on her arm and shoulder.  Additionally has been having worsening swelling of her legs making it difficult and painful for her to walk.  States that she had recently been doubling up on her home Lasix dose per the advice of Cardiology.  Has not had any chest pain.  Admits to ongoing depression and anxiety which has not been well-controlled.  Per EMS report, patient was found to be bradycardic and hypotensive when they arrived.  Subsequently she was given IV fluid and Narcan en route.  On arrival at ED noted to have /67, heart rate 45.  Patient placed in observation on hospital medicine service for further management.  Cardiology consulted.    Shortly after patient was admitted, she developed acute onset of severe chest pain.  EKG showed sinus bradycardia, with no significant change compared to EKG obtained earlier in the day.  On reexamination, chest pain reproducible was tearful and appears significantly  more anxious than she has been previously.    PCP: Alexus Casas NP    SDM:  Daughter, Darío Rodriguez        Overview/Hospital Course:  Bradycardia resolved, BP improved. Started on IV Lasix. Patient continues to report intermittent chest pain that's reproducible as well as LE pain.    PT/OT evaluated patient and recommended HH vs SNF pending progress with acute intervention    UA concerning for UTI with positive nitrite. Hospital course complicated by urinary retention, which may be associated with UTI requiring in and out catheterization x2. Subsequently had atkins placed.    Discussed with patient recommendation to take Buspar dose scheduled rather than as needed and to follow up with psych as she states that she has not seen them in a couple of years.      Interval History: Continued to have urinary retention overnight.  Seen and examined without any family present, PT/OT at bedside. Continues to report LE pain & intermittent chest pain, as well as RUE /shoulder pain. Denies any other complaints at this time. Cardiology recommended continuing IV Lasix for 1 more dose prior to transitioning pt to torsemide.      Review of Systems  Objective:     Vital Signs (Most Recent):  Temp: 98 °F (36.7 °C) (07/12/23 1207)  Pulse: 64 (07/12/23 1207)  Resp: 17 (07/12/23 1207)  BP: 139/70 (07/12/23 1207)  SpO2: 99 % (07/12/23 1207) Vital Signs (24h Range):  Temp:  [98 °F (36.7 °C)] 98 °F (36.7 °C)  Pulse:  [55-70] 64  Resp:  [15-28] 17  SpO2:  [97 %-100 %] 99 %  BP: (112-155)/(59-86) 139/70     Weight: (!) 145.6 kg (320 lb 15.8 oz)  Body mass index is 47.4 kg/m².    Intake/Output Summary (Last 24 hours) at 7/12/2023 1526  Last data filed at 7/12/2023 1156  Gross per 24 hour   Intake --   Output 2825 ml   Net -2825 ml         Physical Exam  Vitals and nursing note reviewed.   Constitutional:       General: She is not in acute distress.     Appearance: She is morbidly obese. She is not ill-appearing.   HENT:       Head: Normocephalic and atraumatic.      Right Ear: External ear normal.      Left Ear: External ear normal.   Eyes:      Pupils: Pupils are equal, round, and reactive to light.   Cardiovascular:      Rate and Rhythm: Normal rate and regular rhythm.   Pulmonary:      Effort: Pulmonary effort is normal. No accessory muscle usage or respiratory distress.      Breath sounds: No wheezing.   Abdominal:      General: There is no distension.      Palpations: Abdomen is soft.      Tenderness: There is no abdominal tenderness. There is no guarding or rebound.   Musculoskeletal:      Right shoulder: Tenderness present.      Cervical back: Neck supple.      Right lower leg: Edema present.      Left lower leg: Edema present.   Skin:     General: Skin is warm and dry.   Neurological:      Mental Status: She is alert and oriented to person, place, and time. Mental status is at baseline.   Psychiatric:         Mood and Affect: Mood is anxious.           Significant Labs: All pertinent labs within the past 24 hours have been reviewed.  CBC:   Recent Labs   Lab 07/11/23  1317 07/12/23  0700   WBC 6.89 7.85   HGB 10.1* 11.6*   HCT 32.7* 37.8    183     CMP:   Recent Labs   Lab 07/11/23  1317 07/12/23  0700    143   K 4.2 4.4   * 112*   CO2 24 21*    91   BUN 28* 23   CREATININE 1.5* 1.3   CALCIUM 8.9 9.2   PROT 6.5 6.8   ALBUMIN 3.1* 3.2*   BILITOT 0.2 0.3   ALKPHOS 87 96   AST 12 15   ALT 9* 12   ANIONGAP 9 10       Significant Imaging: I have reviewed all pertinent imaging results/findings within the past 24 hours.      Assessment/Plan:      * Bradycardia  Patient was noted to have bradycardia with heart rate in the 40s on arrival  She is not on any beta-blocker or any other meds that would cause AV jena blockage  Now resolved  Cardiac monitoring  Cardiology consulted  Etiology unclear      UTI (urinary tract infection)  UA consistent with UTI with positive nitrite, trace leukocyte  Urine culture in  process  Started on ceftriaxone for empiric treatment    Right shoulder pain  Patient reports frequent falls recently including fall onto her right shoulder last Saturday   Right shoulder x-ray showed no evidence of fracture or dislocation  Robaxin  Percocet PRN      Frequent falls  PT/OT recommend HH vs SNF depending on progress  Patient counseled to use walker with ambulation at all times  Concern that polypharmacy is contributory to patient's weakness and frequent falls as she is on Ambien, Percocet, Xanax, Fioricet outpatient  Minimize sedating meds    Urinary retention  Patient noted to have urinary retention in the ED with bladder scan showing > 690 mL , straight cath ordered  Per chart review, she had urinary retention during her last hospital admission   Required in and out catheterization x2  Stop oxybutynin  Puckett catheter placed this morning, patient started on Flomax  Bladder training  Referral to urology outpatient          Chronic diastolic congestive heart failure  Patient is identified as having Diastolic (HFpEF) heart failure that is Acute on chronic. CHF is currently uncontrolled due to Continued edema of extremities. Latest ECHO performed and demonstrates- Results for orders placed during the hospital encounter of 03/09/23    Echo    Interpretation Summary  · The left ventricle is normal in size with concentric hypertrophy and normal systolic function.  · The estimated ejection fraction is 65%.  · Normal left ventricular diastolic function.  · Normal right ventricular size with normal right ventricular systolic function.  · Normal central venous pressure (3 mmHg).  · The estimated PA systolic pressure is 30 mmHg.  . Continue Furosemide and ARNI and monitor clinical status closely. Monitor on telemetry. Patient is on CHF pathway.  Monitor strict Is&Os and daily weights.  Place on fluid restriction of 1.5 L. Cardiology HAS BEEN consulted. Continue to stress to patient importance of self efficacy and   on diet for CHF. Last BNP reviewed- and noted below   Recent Labs   Lab 07/11/23  1317   *   .  Per Cardiology continue IV Lasix for 1 more dose and then transition to oral torsemide 20 mg daily    History of pulmonary embolism  Continue home Eliquis      Other chest pain  Developed sudden-onset severe localized chest pain shortly after admission  Chest pain reproducible on examination, patient also notably anxious and was tearful  Repeat EKG shows sinus bradycardia with no acute ischemic changes  Serial trop negative  Likely etiology musculoskeletal and possibly worsened by anxiety  Changed robaxin dose to scheduled  Started on gabapentin by cardiology      Chronic kidney disease, stage 3a  Renal function appears to be slightly worse above baseline on admission  Improving, near baseline  Strict I&Os  Avoid nephrotoxins and renal dose meds as needed  Monitor renal function        Major depressive disorder, recurrent episode, moderate with anxious distress  Patient has persistent depression which iscurrently uncontrolled.  She admits that she has not been taking her prescribed BuSpar as scheduled as instructed, has been taking it p.r.n..  Will Continue anti-depressant medications. We will not consult psychiatry at this time. Patient does not display psychosis at this time. Continue to monitor closely and adjust plan of care as needed.    Continue BuSpar to be taken as prescribed   PDMP reviewed, continue home Xanax as needed  Discussed with patient that she should follow up with psych after discharge as she had not seen them in a couple of years, patient stated agreement    Class 3 severe obesity in adult  Body mass index is 47.4 kg/m². Morbid obesity complicates all aspects of disease management from diagnostic modalities to treatment. Weight loss encouraged and health benefits explained to patient.       Hypertension  Reports compliance with her home medication including amlodipine, Lasix  Started on  IV Lasix  Resume home antihypertensive meds as BP tolerates  Monitor BP      Malignant neoplasm of upper-outer quadrant of right breast in female, estrogen receptor positive  Follow-up with Oncology as scheduled      NILS (obstructive sleep apnea)  Reports that she is not using CPAP currently after her device was recalled  Monitor respiratory status  Nocturnal O2 as needed        VTE Risk Mitigation (From admission, onward)         Ordered     apixaban tablet 5 mg  2 times daily         07/11/23 1740     Reason for No Pharmacological VTE Prophylaxis  Once        Question:  Reasons:  Answer:  Already adequately anticoagulated on oral Anticoagulants    07/11/23 1512     IP VTE HIGH RISK PATIENT  Once         07/11/23 1512     Place sequential compression device  Until discontinued         07/11/23 1512                Discharge Planning   MARTA:      Code Status: Full Code   Is the patient medically ready for discharge?:     Reason for patient still in hospital (select all that apply): Patient trending condition and Treatment  Discharge Plan A: Home Health                  Marianne Louise DO  Department of Hospital Medicine   [facility] - Med Surg

## 2023-07-12 NOTE — ASSESSMENT & PLAN NOTE
Reports compliance with her home medication including amlodipine, Lasix  Started on IV Lasix  Resume home antihypertensive meds as BP tolerates  Monitor BP

## 2023-07-12 NOTE — ASSESSMENT & PLAN NOTE
PT/OT recommend HH vs SNF depending on progress  Patient counseled to use walker with ambulation at all times  Concern that polypharmacy is contributory to patient's weakness and frequent falls as she is on Ambien, Percocet, Xanax, Fioricet outpatient  Minimize sedating meds

## 2023-07-12 NOTE — PT/OT/SLP EVAL
"Occupational Therapy Evaluation and Treatment    Name: Susu Luther  MRN: [id number]  Admitting Diagnosis: Bradycardia  Recent Surgery: * No surgery found *      Recommendations:     Discharge Recommendations: home health OT, nursing facility, skilled (pending progress with acute intervention)  Level of Assistance Recommended: 24 hours significant assistance  Discharge Equipment Recommendations: walker, rolling  Barriers to discharge: None    Assessment:     Susu Luther is a 73 y.o. female with a medical diagnosis of Bradycardia. She presents with performance deficits affecting function including weakness, impaired endurance, impaired self care skills, impaired functional mobility, impaired balance, impaired cardiopulmonary response to activity, pain, edema, decreased lower extremity function, decreased upper extremity function, impaired fine motor.    Rehab Prognosis: Good; patient would benefit from acute OT services to address these deficits and reach maximum level of function.    Plan:     Patient to be seen 2 x/week to address the above listed problems via self-care/home management, therapeutic activities, therapeutic exercises  Plan of Care Expires: 07/26/23  Plan of Care Reviewed with: patient    Subjective     Chief Complaint: Reported "I haven't sleep and I need a bath."  Patient Comments/Goals: get better  Pain/Comfort:  Pain Rating 1: 10/10  Location - Side 1: Right  Location 1: shoulder  Pain Addressed 1:  (activity pacing)  Pain Rating 2: 10/10  Location - Side 2: Bilateral  Location 2: leg  Pain Addressed 2:  (activity pacing)    Social History:  Living Environment: Patient lives with their spouse in a single story home with number of outside stair(s): no steps to enter.  Prior Level of Function: Prior to admission, patient was independent with ADLs and limited community distance ambulation.  Roles and Routines: Patient was  driving short distances and retired  prior to " admission.  Equipment Used at Home: rollator, bath bench, bedside commode, grab bar, cane, quad  DME owned (not currently used): none  Assistance Upon Discharge: family    Objective:     Communicated with [facility] prior to session. Patient found supine with blood pressure cuff, pulse ox (continuous), telemetry, peripheral IV, atkins catheter upon OT entry to room. Seen in ED on stretcher.    General Precautions: Standard, fall   Orthopedic Precautions: N/A   Braces: N/A    Respiratory Status: Room Air    Occupational Performance    Gait belt applied - Yes    Bed Mobility:   Supine to sit from left side of bed with moderate assistance and of 2 persons  Sit to Supine with moderate assistance and of 2 persons on left side of bed  Max cueing for technique. Completed on ED stretcher from flat position and bed rail not present.    Functional Mobility/Transfers:  Sit <> Stand Transfer with moderate assistance and 2 persons with rolling walker  Side stepping to HOB with mod A of 2 and RW.  No bedside chair present in room, therefore, unable to transfer OOB. Encouraged patient to transfer to bedside chair after transitioned to traditional hospital room.    Activities of Daily Living:  Grooming: set up assistance. Completed while seated in bedside chair with setup for item retrieval and container management.  Lower Body Dressing: maximal assistance    Cognitive/Visual Perceptual:  Cognitive/Psychosocial Skills:    -     Oriented to: Person, Place, Time, Situation  -     Follows Commands/attention: Follows two-step commands    Physical Exam:  Balance:    -     Sitting: contact guard assistance  -     Standing: moderate assistance and of 2 persons  Upper Extremity Range of Motion:     -       Right Upper Extremity: WFL  -       Left Upper Extremity: WFL  Upper Extremity Strength:    -       Right Upper Extremity: Deficits: grossly 4-/5  -       Left Upper Extremity: Deficits: grossly 4-/5   Strength:    -       Right Upper  Extremity: Deficits: fair  -       Left Upper Extremity: Deficits: fair    AMPAC 6 Click ADL:  AMPAC Total Score: 14    Treatment & Education:  Therapist provided facilitation and instruction of proper body mechanics, energy conservation, and fall prevention strategies during tasks listed above  Patient educated on role of OT, POC, and goals for therapy  Patient educated on importance of OOB activities with staff member assistance and sitting OOB majority of the day  Encouraged completion of B UE AROM therex throughout the day to increase functional strength and activity tolerance needed for ADL completion.    Patient left HOB elevated with all lines intact and call button in reach.    GOALS:   Multidisciplinary Problems       Occupational Therapy Goals          Problem: Occupational Therapy    Goal Priority Disciplines Outcome Interventions   Occupational Therapy Goal     OT, PT/OT     Description: Goals to be met by: 7/26/23     Patient will increase functional independence with ADLs by performing:    Toileting from bedside commode with Minimal Assistance for hygiene and clothing management.   Toilet transfer to bedside commode with Contact Guard Assistance.  Increased functional strength in B UE grossly by 1/2 MM grade.                         History:     Past Medical History:   Diagnosis Date    Cataract     Bilateral    Chronic diastolic congestive heart failure 08/25/2020    Dizziness 03/12/2023    Encounter for blood transfusion     History of repair of aneurysm of abdominal aorta using endovascular stent graft     DR Bonds    Hx of psychiatric care     Hypertension     Major depressive disorder, single episode, moderate with anxious distress 01/14/2020    Malignant neoplasm of upper-outer quadrant of right breast in female, estrogen receptor positive 03/14/2019    radiation    Psychiatric problem     Sleep apnea     Sleep difficulties     Stroke 2009    no residual defect    Therapy          Past Surgical  History:   Procedure Laterality Date    APPENDECTOMY      AXILLARY NODE DISSECTION Right 2020    Procedure: LYMPHADENECTOMY, AXILLARY;  Surgeon: Artemio Starks MD;  Location: [facility] [place];  Service: General;  Laterality: Right;    BIOPSY OF AXILLARY NODE Right 2021    Procedure: BIOPSY, LYMPH NODE, AXILLARY;  Surgeon: Artemio Sutton MD;  Location: [address];  Service: General;  Laterality: Right;    BREAST BIOPSY          BREAST LUMPECTOMY      2019     SECTION      x 1    OOPHORECTOMY      right     SENTINEL LYMPH NODE BIOPSY Right 2019    Procedure: BIOPSY, LYMPH NODE, SENTINEL;  Surgeon: Artemio Starks MD;  Location: [facility] [place];  Service: General;  Laterality: Right;    splenic artery aneurysm repair      TONSILLECTOMY         Time Tracking:     OT Date of Treatment: 23  OT Start Time: 830  OT Stop Time: 910  OT Total Time (min): 40 min    Billable Minutes: Evaluation 15, Self Care/Home Management 10, and Therapeutic Activity 15    2023

## 2023-07-12 NOTE — ASSESSMENT & PLAN NOTE
Patient is identified as having Diastolic (HFpEF) heart failure that is Acute on chronic. CHF is currently uncontrolled due to Continued edema of extremities. Latest ECHO performed and demonstrates- Results for orders placed during the hospital encounter of 03/09/23    Echo    Interpretation Summary  · The left ventricle is normal in size with concentric hypertrophy and normal systolic function.  · The estimated ejection fraction is 65%.  · Normal left ventricular diastolic function.  · Normal right ventricular size with normal right ventricular systolic function.  · Normal central venous pressure (3 mmHg).  · The estimated PA systolic pressure is 30 mmHg.  . Continue Furosemide and ARNI and monitor clinical status closely. Monitor on telemetry. Patient is on CHF pathway.  Monitor strict Is&Os and daily weights.  Place on fluid restriction of 1.5 L. Cardiology HAS BEEN consulted. Continue to stress to patient importance of self efficacy and  on diet for CHF. Last BNP reviewed- and noted below   Recent Labs   Lab 07/11/23  1317   *   .  Per Cardiology continue IV Lasix for 1 more dose and then transition to oral torsemide 20 mg daily

## 2023-07-12 NOTE — CHAPLAIN
Initial visit with patient and her spouse.  Visited with patient and her spouse to assess for spiritual and emotional needs.  Pt was doing okay at the time of my visit.  She mentioned that she has good support from her own .  Pt did want me to pray and I took time to do this for them before leaving.  Spiritual care remains available as needed.    Chaplain Raul Lawrence M.Div., [facility]

## 2023-07-12 NOTE — ASSESSMENT & PLAN NOTE
Patient reports frequent falls recently including fall onto her right shoulder last Saturday   Right shoulder x-ray showed no evidence of fracture or dislocation  Robaxin  Percocet PRN

## 2023-07-12 NOTE — PLAN OF CARE
OT lolis completed. Sup>sit mod A of 2, sit>stand mod A of 2, side stepping at EOB with mod A of 2, sit>sup mod A of 2. Recommending SNF vs HHOT pending progress with acute intervention.

## 2023-07-13 ENCOUNTER — DOCUMENTATION ONLY (OUTPATIENT)
Dept: CARDIOLOGY | Facility: CLINIC | Age: 73
End: 2023-07-13
Payer: MEDICARE

## 2023-07-13 PROBLEM — R53.1 WEAKNESS: Status: ACTIVE | Noted: 2023-07-13

## 2023-07-13 LAB
ALBUMIN SERPL BCP-MCNC: 3.2 G/DL (ref 3.5–5.2)
ALP SERPL-CCNC: 110 U/L (ref 55–135)
ALT SERPL W/O P-5'-P-CCNC: 10 U/L (ref 10–44)
ANION GAP SERPL CALC-SCNC: 10 MMOL/L (ref 8–16)
AST SERPL-CCNC: 12 U/L (ref 10–40)
BACTERIA UR CULT: ABNORMAL
BASOPHILS # BLD AUTO: 0.05 K/UL (ref 0–0.2)
BASOPHILS NFR BLD: 0.7 % (ref 0–1.9)
BILIRUB SERPL-MCNC: 0.3 MG/DL (ref 0.1–1)
BUN SERPL-MCNC: 20 MG/DL (ref 8–23)
CALCIUM SERPL-MCNC: 9.4 MG/DL (ref 8.7–10.5)
CHLORIDE SERPL-SCNC: 107 MMOL/L (ref 95–110)
CO2 SERPL-SCNC: 25 MMOL/L (ref 23–29)
CREAT SERPL-MCNC: 1.3 MG/DL (ref 0.5–1.4)
DIFFERENTIAL METHOD: ABNORMAL
EOSINOPHIL # BLD AUTO: 0.2 K/UL (ref 0–0.5)
EOSINOPHIL NFR BLD: 2.2 % (ref 0–8)
ERYTHROCYTE [DISTWIDTH] IN BLOOD BY AUTOMATED COUNT: 14.9 % (ref 11.5–14.5)
EST. GFR  (NO RACE VARIABLE): 44 ML/MIN/1.73 M^2
GLUCOSE SERPL-MCNC: 113 MG/DL (ref 70–110)
HCT VFR BLD AUTO: 35.3 % (ref 37–48.5)
HGB BLD-MCNC: 11.1 G/DL (ref 12–16)
IMM GRANULOCYTES # BLD AUTO: 0.02 K/UL (ref 0–0.04)
IMM GRANULOCYTES NFR BLD AUTO: 0.3 % (ref 0–0.5)
LYMPHOCYTES # BLD AUTO: 2.3 K/UL (ref 1–4.8)
LYMPHOCYTES NFR BLD: 32.9 % (ref 18–48)
MAGNESIUM SERPL-MCNC: 2 MG/DL (ref 1.6–2.6)
MCH RBC QN AUTO: 25.3 PG (ref 27–31)
MCHC RBC AUTO-ENTMCNC: 31.4 G/DL (ref 32–36)
MCV RBC AUTO: 81 FL (ref 82–98)
MONOCYTES # BLD AUTO: 0.7 K/UL (ref 0.3–1)
MONOCYTES NFR BLD: 10.3 % (ref 4–15)
NEUTROPHILS # BLD AUTO: 3.7 K/UL (ref 1.8–7.7)
NEUTROPHILS NFR BLD: 53.6 % (ref 38–73)
NRBC BLD-RTO: 0 /100 WBC
PLATELET # BLD AUTO: 234 K/UL (ref 150–450)
PMV BLD AUTO: 9.6 FL (ref 9.2–12.9)
POTASSIUM SERPL-SCNC: 3.5 MMOL/L (ref 3.5–5.1)
PROT SERPL-MCNC: 6.8 G/DL (ref 6–8.4)
RBC # BLD AUTO: 4.38 M/UL (ref 4–5.4)
SODIUM SERPL-SCNC: 142 MMOL/L (ref 136–145)
WBC # BLD AUTO: 6.86 K/UL (ref 3.9–12.7)

## 2023-07-13 PROCEDURE — 99213 OFFICE O/P EST LOW 20 MIN: CPT | Mod: HCNC,,, | Performed by: STUDENT IN AN ORGANIZED HEALTH CARE EDUCATION/TRAINING PROGRAM

## 2023-07-13 PROCEDURE — 80053 COMPREHEN METABOLIC PANEL: CPT | Mod: HCNC | Performed by: INTERNAL MEDICINE

## 2023-07-13 PROCEDURE — 25000003 PHARM REV CODE 250: Mod: HCNC | Performed by: INTERNAL MEDICINE

## 2023-07-13 PROCEDURE — G0378 HOSPITAL OBSERVATION PER HR: HCPCS | Mod: HCNC

## 2023-07-13 PROCEDURE — 63600175 PHARM REV CODE 636 W HCPCS: Mod: HCNC | Performed by: NURSE PRACTITIONER

## 2023-07-13 PROCEDURE — 85025 COMPLETE CBC W/AUTO DIFF WBC: CPT | Mod: HCNC | Performed by: INTERNAL MEDICINE

## 2023-07-13 PROCEDURE — 97530 THERAPEUTIC ACTIVITIES: CPT | Mod: HCNC,CQ

## 2023-07-13 PROCEDURE — 83735 ASSAY OF MAGNESIUM: CPT | Mod: HCNC | Performed by: INTERNAL MEDICINE

## 2023-07-13 PROCEDURE — 99213 PR OFFICE/OUTPT VISIT, EST, LEVL III, 20-29 MIN: ICD-10-PCS | Mod: HCNC,,, | Performed by: STUDENT IN AN ORGANIZED HEALTH CARE EDUCATION/TRAINING PROGRAM

## 2023-07-13 PROCEDURE — 96365 THER/PROPH/DIAG IV INF INIT: CPT | Mod: 59

## 2023-07-13 PROCEDURE — 25000003 PHARM REV CODE 250: Mod: HCNC | Performed by: NURSE PRACTITIONER

## 2023-07-13 PROCEDURE — 97116 GAIT TRAINING THERAPY: CPT | Mod: HCNC,CQ

## 2023-07-13 PROCEDURE — 36415 COLL VENOUS BLD VENIPUNCTURE: CPT | Mod: HCNC | Performed by: INTERNAL MEDICINE

## 2023-07-13 PROCEDURE — 25000003 PHARM REV CODE 250: Mod: HCNC | Performed by: STUDENT IN AN ORGANIZED HEALTH CARE EDUCATION/TRAINING PROGRAM

## 2023-07-13 RX ORDER — TORSEMIDE 10 MG/1
20 TABLET ORAL DAILY
Status: DISCONTINUED | OUTPATIENT
Start: 2023-07-13 | End: 2023-07-14 | Stop reason: HOSPADM

## 2023-07-13 RX ORDER — HYDROXYZINE PAMOATE 25 MG/1
25 CAPSULE ORAL EVERY 8 HOURS PRN
Status: DISCONTINUED | OUTPATIENT
Start: 2023-07-13 | End: 2023-07-14 | Stop reason: HOSPADM

## 2023-07-13 RX ORDER — ACETAMINOPHEN 325 MG/1
650 TABLET ORAL EVERY 4 HOURS PRN
Status: DISCONTINUED | OUTPATIENT
Start: 2023-07-13 | End: 2023-07-14 | Stop reason: HOSPADM

## 2023-07-13 RX ORDER — BUTALBITAL, ACETAMINOPHEN AND CAFFEINE 50; 325; 40 MG/1; MG/1; MG/1
1 TABLET ORAL EVERY 6 HOURS PRN
Status: DISCONTINUED | OUTPATIENT
Start: 2023-07-13 | End: 2023-07-14 | Stop reason: HOSPADM

## 2023-07-13 RX ADMIN — SACUBITRIL AND VALSARTAN 1 TABLET: 24; 26 TABLET, FILM COATED ORAL at 01:07

## 2023-07-13 RX ADMIN — BUTALBITAL, ACETAMINOPHEN, AND CAFFEINE 1 TABLET: 325; 50; 40 TABLET ORAL at 04:07

## 2023-07-13 RX ADMIN — TORSEMIDE 20 MG: 10 TABLET ORAL at 03:07

## 2023-07-13 RX ADMIN — BUPROPION HYDROCHLORIDE 150 MG: 150 TABLET, FILM COATED, EXTENDED RELEASE ORAL at 10:07

## 2023-07-13 RX ADMIN — CEFTRIAXONE 1 G: 1 INJECTION, POWDER, FOR SOLUTION INTRAMUSCULAR; INTRAVENOUS at 12:07

## 2023-07-13 RX ADMIN — Medication 6 MG: at 08:07

## 2023-07-13 RX ADMIN — APIXABAN 5 MG: 2.5 TABLET, FILM COATED ORAL at 10:07

## 2023-07-13 RX ADMIN — GABAPENTIN 300 MG: 300 CAPSULE ORAL at 10:07

## 2023-07-13 RX ADMIN — METHOCARBAMOL 750 MG: 750 TABLET ORAL at 10:07

## 2023-07-13 RX ADMIN — SACUBITRIL AND VALSARTAN 1 TABLET: 24; 26 TABLET, FILM COATED ORAL at 08:07

## 2023-07-13 RX ADMIN — ALPRAZOLAM 0.5 MG: 0.5 TABLET ORAL at 04:07

## 2023-07-13 RX ADMIN — ATORVASTATIN CALCIUM 20 MG: 10 TABLET, FILM COATED ORAL at 10:07

## 2023-07-13 RX ADMIN — METHOCARBAMOL 750 MG: 750 TABLET ORAL at 08:07

## 2023-07-13 RX ADMIN — GABAPENTIN 300 MG: 300 CAPSULE ORAL at 08:07

## 2023-07-13 RX ADMIN — APIXABAN 5 MG: 2.5 TABLET, FILM COATED ORAL at 08:07

## 2023-07-13 RX ADMIN — GABAPENTIN 300 MG: 300 CAPSULE ORAL at 03:07

## 2023-07-13 RX ADMIN — TAMSULOSIN HYDROCHLORIDE 0.4 MG: 0.4 CAPSULE ORAL at 10:07

## 2023-07-13 RX ADMIN — METHOCARBAMOL 750 MG: 750 TABLET ORAL at 03:07

## 2023-07-13 NOTE — HOSPITAL COURSE
Ms. Luther is a 72y/o AA female with PMHx of diastolic HF, CVA, HLD, breast CA, hx of recurrent PE, AAA s/p repair, HTN, anxiety,  TANNER, NILS, obesity who presented to [facility] ED c/o chest pain and recurrent falls. She does not ambulate regularly with her walker at home. Takes eliquis on a regular basis. Believes chest pain has been constant and worsening. Also reports worsening swelling in her feet not resolving with home lasix.   In the ED, tele showing episodes of sinus bradycardia which patient has experienced in the past. Per last cardiololgy note, shouold not be taking coreg.   Denies syncope.  EKG with NSR, LAFB  Troponin negative    Started on IV lasix   Echo Mar 23' EF nml  Nuclear stress Jan 23' negative for ischemia       7/13/23   Chest pain better today. Improved with gabapentin. Legs are still painful. Will work with PT today. Labs stable today

## 2023-07-13 NOTE — PLAN OF CARE
Problem: Adult Inpatient Plan of Care  Goal: Plan of Care Review  Outcome: Ongoing, Progressing     Problem: Adult Inpatient Plan of Care  Goal: Patient-Specific Goal (Individualized)  Outcome: Ongoing, Progressing  Flowsheets (Taken 7/13/2023 0147)  Anxieties, Fears or Concerns: anxious  Individualized Care Needs: lab results     Problem: Bariatric Environmental Safety  Goal: Safety Maintained with Care  Outcome: Ongoing, Progressing     Problem: Infection  Goal: Absence of Infection Signs and Symptoms  Outcome: Ongoing, Progressing     Problem: Skin Injury Risk Increased  Goal: Skin Health and Integrity  Outcome: Ongoing, Progressing      Discussed POC with pt and daughters, verbalized understanding.  Patient remains free from injury. Safety precautions maintained. No s/s of acute distress.  Purposeful rounding every two hours.   Pain controlled per MD order.  Cardiac monitoring in place, tele box number 1717  Diet orders continued, pt diet: low sodium, 1500 mL fluid restriction  Vital signs continued per orders this shift.   Q2 turning refused, pt educated.  Puckett care continued this shift.   Chart and orders review completed. Pt education about care completed.

## 2023-07-13 NOTE — PLAN OF CARE
[facility] - Med [facility]urg  Discharge Final Note    Primary Care Provider: Alexus Casas NP    Expected Discharge Date: 7/13/2023    Final Discharge Note (most recent)       Final Note - 07/13/23 0943          Final Note    Assessment Type Final Discharge Note     Anticipated Discharge Disposition Home or Self Care     Hospital Resources/Appts/Education Provided Appointments scheduled by Navigator/Coordinator;Appointments scheduled and added to AVS        Post-Acute Status    Discharge Delays None known at this time                   PCP appt scheduled and added to AVS.     No additional d/c needs.         07/13/23 1122   Post-Acute Status   Post-Acute Authorization Home Health   Home Health Status   (pending MD orders)   Discharge Plan   Discharge Plan A Home Health     Pending MD orders for [facility].     [facility] is pt's preference.

## 2023-07-13 NOTE — PROGRESS NOTES
"  [facility]) nurse navigator notified of HFTCC candidate in need of education and introduction to 4-6 week program.      PT aao x 3 while lying in bed  with spouse at bedside. Introduced self to pt as HFTCC nurse navigator.     Patient given "Home Care Guide for Heart Failure Patients" , "[facility]" flyer and "Daily weight and symptom tracker".  Encouraged pt and caregiver to review information.      Reviewed the following key points of HFTCC program with pt and family:   1.) Take your medications as directed.    2.) Weight yourself daily   3.) Follow low salt and limited fluid diet.    4.) Stop smoking and start exercising   5.) Go to your appointments and call your team.      Pt reminded to follow Symptom tracker and to call at the onset of symptoms according to tracker.     Reviewed plan for follow up once discharged to include phone calls, in person and virtual visits to assist pt optimizing their heart failure medication regimen and encouraging healthy lifestyle modifications.  Reminded pt that program will assist them over the next 4-6 weeks and then patient will be transferred to long term care provider .  Reminded pt how to contact HFTCC navigator via phone and or via [employer].     Pt given appointment or instructed appointment will be printed on hospital discharge paperwork.     Pt also reminded HF nurse will call 48-72 hours after discharge to check on them.     PT and family verbalize read back of information given.  Encouraged pt and family to read over information often and contact team with any questions or concerns.     "

## 2023-07-13 NOTE — ASSESSMENT & PLAN NOTE
Atypical  All over pain  Will start gabapentin (has helped pain in the past)    7/13/23  Improved with gabapentin, can titrate up as tolerated

## 2023-07-13 NOTE — ASSESSMENT & PLAN NOTE
Patient has persistent depression which iscurrently uncontrolled.  She admits that she has not been taking her prescribed BuSpar as scheduled as instructed, has been taking it p.r.n..  Will Continue anti-depressant medications. We will not consult psychiatry at this time. Patient does not display psychosis at this time. Continue to monitor closely and adjust plan of care as needed.    PDMP reviewed, continue home Xanax as needed  Discussed with patient that she should follow up with psych after discharge as she had not seen them in a couple of years, patient stated agreement

## 2023-07-13 NOTE — SUBJECTIVE & OBJECTIVE
Review of Systems   Constitutional: Negative for diaphoresis, malaise/fatigue, weight gain and weight loss.   HENT:  Negative for congestion and nosebleeds.    Cardiovascular:  Positive for chest pain. Negative for claudication, cyanosis, dyspnea on exertion, irregular heartbeat, leg swelling, near-syncope, orthopnea, palpitations, paroxysmal nocturnal dyspnea and syncope.   Respiratory:  Negative for cough, hemoptysis, shortness of breath, sleep disturbances due to breathing, snoring, sputum production and wheezing.    Hematologic/Lymphatic: Negative for bleeding problem. Does not bruise/bleed easily.   Skin:  Negative for rash.   Musculoskeletal:  Positive for joint pain and muscle cramps. Negative for arthritis, back pain, falls and muscle weakness.   Gastrointestinal:  Negative for abdominal pain, constipation, diarrhea, heartburn, hematemesis, hematochezia, melena, nausea and vomiting.   Genitourinary:  Negative for dysuria, hematuria and nocturia.   Neurological:  Negative for excessive daytime sleepiness, dizziness, headaches, light-headedness, loss of balance, numbness, vertigo and weakness.   Objective:     Vital Signs (Most Recent):  Temp: 98.1 °F (36.7 °C) (07/13/23 0755)  Pulse: 66 (07/13/23 0755)  Resp: 16 (07/13/23 0755)  BP: (!) 176/79 (07/13/23 0755)  SpO2: 95 % (07/13/23 0755) Vital Signs (24h Range):  Temp:  [97.8 °F (36.6 °C)-98.1 °F (36.7 °C)] 98.1 °F (36.7 °C)  Pulse:  [] 66  Resp:  [16-17] 16  SpO2:  [95 %-99 %] 95 %  BP: (121-176)/(64-79) 176/79     Weight: (!) 145.9 kg (321 lb 10.4 oz)  Body mass index is 47.5 kg/m².     SpO2: 95 %         Intake/Output Summary (Last 24 hours) at 7/13/2023 1520  Last data filed at 7/13/2023 1507  Gross per 24 hour   Intake 1599.01 ml   Output 2000 ml   Net -400.99 ml       Lines/Drains/Airways       Drain  Duration                  Urethral Catheter 07/12/23 0755 Non-latex;Silicone 16 Fr. 1 day              Peripheral Intravenous Line  Duration                   Peripheral IV - Single Lumen 07/12/23 1900 22 G Anterior;Left Forearm <1 day                       Physical Exam       Significant Labs: BMP:   Recent Labs   Lab 07/12/23  0700 07/13/23  0643   GLU 91 113*    142   K 4.4 3.5   * 107   CO2 21* 25   BUN 23 20   CREATININE 1.3 1.3   CALCIUM 9.2 9.4   MG 2.1 2.0   , CMP   Recent Labs   Lab 07/12/23  0700 07/13/23  0643    142   K 4.4 3.5   * 107   CO2 21* 25   GLU 91 113*   BUN 23 20   CREATININE 1.3 1.3   CALCIUM 9.2 9.4   PROT 6.8 6.8   ALBUMIN 3.2* 3.2*   BILITOT 0.3 0.3   ALKPHOS 96 110   AST 15 12   ALT 12 10   ANIONGAP 10 10   , CBC   Recent Labs   Lab 07/12/23  0700 07/13/23  0643   WBC 7.85 6.86   HGB 11.6* 11.1*   HCT 37.8 35.3*    234   , INR No results for input(s): INR, PROTIME in the last 48 hours., Lipid Panel No results for input(s): CHOL, HDL, LDLCALC, TRIG, CHOLHDL in the last 48 hours., and Troponin   Recent Labs   Lab 07/11/23  1714 07/12/23  0304   TROPONINI 0.024 0.025       Significant Imaging: Echocardiogram: 2D echo with color flow doppler: No results found. However, due to the size of the patient record, not all encounters were searched. Please check Results Review for a complete set of results. and Transthoracic echo (TTE) complete (Cupid Only):   Results for orders placed or performed during the hospital encounter of 03/09/23   Echo   Result Value Ref Range    BSA 2.58 m2    TDI SEPTAL 0.07 m/s    LV LATERAL E/E' RATIO 5.89 m/s    LV SEPTAL E/E' RATIO 7.57 m/s    LA WIDTH 4.20 cm    IVC diameter 1.5 cm    Left Ventricular Outflow Tract Mean Velocity 1.02 cm/s    Left Ventricular Outflow Tract Mean Gradient 4.30 mmHg    TV mean gradient 19 mmHg    TDI LATERAL 0.09 m/s    LVIDd 4.41 3.5 - 6.0 cm    IVS 1.60 (A) 0.6 - 1.1 cm    Posterior Wall 1.49 (A) 0.6 - 1.1 cm    Ao root annulus 3.15 cm    LVIDs 2.57 2.1 - 4.0 cm    FS 42 28 - 44 %    LA volume 67.89 cm3    STJ 2.94 cm    Ascending aorta 2.67 cm     LV mass 280.24 g    LA size 3.64 cm    TAPSE 2.00 cm    Left Ventricle Relative Wall Thickness 0.68 cm    AV mean gradient 6 mmHg    AV valve area 2.86 cm2    AV Velocity Ratio 0.79     AV index (prosthetic) 0.91     MV valve area p 1/2 method 2.79 cm2    E/A ratio 0.82     Mean e' 0.08 m/s    E wave deceleration time 272.20 msec    IVRT 76.12 msec    LVOT diameter 2.00 cm    LVOT area 3.1 cm2    LVOT peak henry 1.19 m/s    LVOT peak VTI 28.80 cm    Ao peak henry 1.50 m/s    Ao VTI 31.6 cm    RVOT peak henry 1.04 m/s    RVOT peak VTI 25.1 cm    LVOT stroke volume 90.43 cm3    AV peak gradient 9 mmHg    PV mean gradient 2.75 mmHg    E/E' ratio 6.63 m/s    MV Peak E Henry 0.53 m/s    TR Max Henry 2.60 m/s    MV stenosis pressure 1/2 time 78.94 ms    MV Peak A Henry 0.65 m/s    LV Systolic Volume 23.99 mL    LV Systolic Volume Index 9.8 mL/m2    LV Diastolic Volume 87.94 mL    LV Diastolic Volume Index 35.75 mL/m2    LA Volume Index 27.6 mL/m2    LV Mass Index 114 g/m2    RA Major Axis 3.89 cm    Left Atrium Minor Axis 5.40 cm    Left Atrium Major Axis 5.06 cm    Triscuspid Valve Regurgitation Peak Gradient 27 mmHg    RA Width 3.00 cm    Right Atrial Pressure (from IVC) 3 mmHg    EF 65 %    TV rest pulmonary artery pressure 30 mmHg    Narrative    · The left ventricle is normal in size with concentric hypertrophy and   normal systolic function.  · The estimated ejection fraction is 65%.  · Normal left ventricular diastolic function.  · Normal right ventricular size with normal right ventricular systolic   function.  · Normal central venous pressure (3 mmHg).  · The estimated PA systolic pressure is 30 mmHg.

## 2023-07-13 NOTE — PROGRESS NOTES
[facility]– [place] Medicine  Progress Note    Patient Name: Susu Luther  MRN: [id number]  Patient Class: OP- Observation   Admission Date: 7/11/2023  Length of Stay: 0 days  Attending Physician: Leif Delgadillo, *  Primary Care Provider: Alexus Casas NP        Subjective:     Principal Problem:Bradycardia        HPI:  Susu Luther is a 73 y.o. female with Chronic diastolic congestive heart failure, History of repair of aneurysm of abdominal aorta using endovascular stent graft, psychiatric care, Hypertension, Major depressive disorder, Malignant neoplasm of upper-outer quadrant of right breast in female, estrogen receptor positive, Sleep apnea, Stroke  who presented to ED on 7/11/2023 with weakness.  Patient reports that she was feeling extremely short of breath this morning, subsequently a friend called EMS.  Additionally she has been having a lot of falls at home including last Saturday.  Reports that she fell onto her right shoulder and since then has been having a lot of pain on her arm and shoulder.  Additionally has been having worsening swelling of her legs making it difficult and painful for her to walk.  States that she had recently been doubling up on her home Lasix dose per the advice of Cardiology.  Has not had any chest pain.  Admits to ongoing depression and anxiety which has not been well-controlled.  Per EMS report, patient was found to be bradycardic and hypotensive when they arrived.  Subsequently she was given IV fluid and Narcan en route.  On arrival at ED noted to have /67, heart rate 45.  Patient placed in observation on hospital medicine service for further management.  Cardiology consulted.    Shortly after patient was admitted, she developed acute onset of severe chest pain.  EKG showed sinus bradycardia, with no significant change compared to EKG obtained earlier in the day.  On reexamination, chest pain reproducible was tearful and appears  significantly more anxious than she has been previously.    PCP: Alexus Casas NP    SDM:  Daughter, Darío Rodriguez        Overview/Hospital Course:  Bradycardia resolved, BP improved. Started on IV Lasix. Patient continues to report intermittent chest pain that's reproducible as well as LE pain.  PT/OT evaluated patient and recommended HH vs SNF pending progress with acute intervention  UA concerning for UTI with positive nitrite. Hospital course complicated by urinary retention, which may be associated with UTI requiring in and out catheterization x2. Subsequently had atkins placed.  Discussed with patient recommendation to take Buspar dose scheduled rather than as needed and to follow up with psych as she states that she has not seen them in a couple of years.  7/13 NAEON . Atkins in placed . She is  complaining of anxiety and HA . She denies any chest pain or SOB . Medication adjuted . PT/OT rec SNF .      Interval History:     Review of Systems  Objective:     Vital Signs (Most Recent):  Temp: 98.2 °F (36.8 °C) (07/13/23 1627)  Pulse: 71 (07/13/23 1627)  Resp: 17 (07/13/23 1627)  BP: (!) 183/83 (07/13/23 1627)  SpO2: 98 % (07/13/23 1627) Vital Signs (24h Range):  Temp:  [97.8 °F (36.6 °C)-98.2 °F (36.8 °C)] 98.2 °F (36.8 °C)  Pulse:  [] 71  Resp:  [16-17] 17  SpO2:  [95 %-99 %] 98 %  BP: (124-183)/(64-83) 183/83     Weight: (!) 145.9 kg (321 lb 10.4 oz)  Body mass index is 47.5 kg/m².    Intake/Output Summary (Last 24 hours) at 7/13/2023 1706  Last data filed at 7/13/2023 1507  Gross per 24 hour   Intake 1599.01 ml   Output 2000 ml   Net -400.99 ml         Physical Exam  Vitals and nursing note reviewed.   Constitutional:       General: She is not in acute distress.     Appearance: She is morbidly obese. She is not ill-appearing.   HENT:      Head: Normocephalic and atraumatic.      Right Ear: External ear normal.      Left Ear: External ear normal.   Eyes:      Pupils: Pupils are equal,  round, and reactive to light.   Cardiovascular:      Rate and Rhythm: Normal rate and regular rhythm.   Pulmonary:      Effort: Pulmonary effort is normal. No accessory muscle usage or respiratory distress.      Breath sounds: No wheezing.   Abdominal:      General: There is no distension.      Palpations: Abdomen is soft.      Tenderness: There is no abdominal tenderness. There is no guarding or rebound.   Musculoskeletal:      Right shoulder: Tenderness present.      Cervical back: Neck supple.      Right lower leg: Edema present.      Left lower leg: Edema present.   Skin:     General: Skin is warm and dry.   Neurological:      Mental Status: She is alert and oriented to person, place, and time. Mental status is at baseline.   Psychiatric:         Mood and Affect: Mood is anxious.           Significant Labs: All pertinent labs within the past 24 hours have been reviewed.  Recent Lab Results         07/13/23  0643        Albumin 3.2       Alkaline Phosphatase 110       ALT 10       Anion Gap 10       AST 12       Baso # 0.05       Basophil % 0.7       BILIRUBIN TOTAL 0.3  Comment: For infants and newborns, interpretation of results should be based  on gestational age, weight and in agreement with clinical  observations.    Premature Infant recommended reference ranges:  Up to 24 hours.............<8.0 mg/dL  Up to 48 hours............<12.0 mg/dL  3-5 days..................<15.0 mg/dL  6-29 days.................<15.0 mg/dL         BUN 20       Calcium 9.4       Chloride 107       CO2 25       Creatinine 1.3       Differential Method Automated       eGFR 44       Eos # 0.2       Eosinophil % 2.2       Glucose 113       Gran # (ANC) 3.7       Gran % 53.6       Hematocrit 35.3       Hemoglobin 11.1       Immature Grans (Abs) 0.02  Comment: Mild elevation in immature granulocytes is non specific and   can be seen in a variety of conditions including stress response,   acute inflammation, trauma and pregnancy.  Correlation with other   laboratory and clinical findings is essential.         Immature Granulocytes 0.3       Lymph # 2.3       Lymph % 32.9       Magnesium 2.0       MCH 25.3       MCHC 31.4       MCV 81       Mono # 0.7       Mono % 10.3       MPV 9.6       nRBC 0       Platelets 234       Potassium 3.5       PROTEIN TOTAL 6.8       RBC 4.38       RDW 14.9       Sodium 142       WBC 6.86               Significant Imaging: I have reviewed all pertinent imaging results/findings within the past 24 hours.      Assessment/Plan:      * Bradycardia  Patient was noted to have bradycardia with heart rate in the 40s on arrival  She is not on any beta-blocker or any other meds that would cause AV jena blockage  Now resolved  Cardiac monitoring  Cardiology consulted  Etiology unclear      Weakness  PT/OT rec SNF      UTI (urinary tract infection)  UA consistent with UTI with positive nitrite, trace leukocyte  Urine culture in process  Started on ceftriaxone for empiric treatment    Right shoulder pain  Patient reports frequent falls recently including fall onto her right shoulder last Saturday   Right shoulder x-ray showed no evidence of fracture or dislocation  Robaxin  Percocet PRN      Frequent falls  PT/OT recommend HH vs SNF depending on progress  Patient counseled to use walker with ambulation at all times  Concern that polypharmacy is contributory to patient's weakness and frequent falls as she is on Ambien, Percocet, Xanax, Fioricet outpatient  Minimize sedating meds    Urinary retention  Patient noted to have urinary retention in the ED with bladder scan showing > 690 mL , straight cath ordered  Per chart review, she had urinary retention during her last hospital admission   Required in and out catheterization x2  Stop oxybutynin  Puckett catheter placed this morning, patient started on Flomax  Bladder training  Referral to urology outpatient          Chronic diastolic congestive heart failure  Patient is identified  as having Diastolic (HFpEF) heart failure that is Acute on chronic. CHF is currently uncontrolled due to Continued edema of extremities. Latest ECHO performed and demonstrates- Results for orders placed during the hospital encounter of 03/09/23    Echo    Interpretation Summary  · The left ventricle is normal in size with concentric hypertrophy and normal systolic function.  · The estimated ejection fraction is 65%.  · Normal left ventricular diastolic function.  · Normal right ventricular size with normal right ventricular systolic function.  · Normal central venous pressure (3 mmHg).  · The estimated PA systolic pressure is 30 mmHg.  . Continue Furosemide and ARNI and monitor clinical status closely. Monitor on telemetry. Patient is on CHF pathway.  Monitor strict Is&Os and daily weights.  Place on fluid restriction of 1.5 L. Cardiology HAS BEEN consulted. Continue to stress to patient importance of self efficacy and  on diet for CHF. Last BNP reviewed- and noted below   Recent Labs   Lab 07/11/23  1317   *   .  Cont  transition to oral torsemide 20 mg daily    History of pulmonary embolism  Continue home Eliquis      Other chest pain  Developed sudden-onset severe localized chest pain shortly after admission  Chest pain reproducible on examination, patient also notably anxious and was tearful  Repeat EKG shows sinus bradycardia with no acute ischemic changes  Serial trop negative  Likely etiology musculoskeletal and possibly worsened by anxiety  Changed robaxin dose to scheduled  Started on gabapentin by cardiology      Chronic kidney disease, stage 3a  Renal function appears to be slightly worse above baseline on admission  Improving, near baseline  Strict I&Os  Avoid nephrotoxins and renal dose meds as needed  Monitor renal function        Major depressive disorder, recurrent episode, moderate with anxious distress  Patient has persistent depression which iscurrently uncontrolled.  She admits that  she has not been taking her prescribed BuSpar as scheduled as instructed, has been taking it p.r.n..  Will Continue anti-depressant medications. We will not consult psychiatry at this time. Patient does not display psychosis at this time. Continue to monitor closely and adjust plan of care as needed.    PDMP reviewed, continue home Xanax as needed  Discussed with patient that she should follow up with psych after discharge as she had not seen them in a couple of years, patient stated agreement    Class 3 severe obesity in adult  Body mass index is 47.5 kg/m². Morbid obesity complicates all aspects of disease management from diagnostic modalities to treatment. Weight loss encouraged and health benefits explained to patient.       Hypertension  Reports compliance with her home medication including amlodipine, Lasix  Resume home antihypertensive meds as BP tolerates  Monitor BP      Malignant neoplasm of upper-outer quadrant of right breast in female, estrogen receptor positive  Follow-up with Oncology as scheduled      NILS (obstructive sleep apnea)  Reports that she is not using CPAP currently after her device was recalled  Monitor respiratory status  Nocturnal O2 as needed        VTE Risk Mitigation (From admission, onward)         Ordered     apixaban tablet 5 mg  2 times daily         07/11/23 2843     Reason for No Pharmacological VTE Prophylaxis  Once        Question:  Reasons:  Answer:  Already adequately anticoagulated on oral Anticoagulants    07/11/23 1512     IP VTE HIGH RISK PATIENT  Once         07/11/23 1512     Place sequential compression device  Until discontinued         07/11/23 1512                Discharge Planning   MARTA: 7/13/2023     Code Status: Full Code   Is the patient medically ready for discharge?:     Reason for patient still in hospital (select all that apply): Treatment  Discharge Plan A: Skilled Nursing Facility   Discharge Delays: None known at this time              Leif Jackson  MD Elie  Department of Hospital Medicine   '[facility]

## 2023-07-13 NOTE — PROGRESS NOTES
[facility]  Cardiology  Progress Note    Patient Name: Susu Luther  MRN: [id number]  Admission Date: 7/11/2023  Hospital Length of Stay: 0 days  Code Status: Full Code   Attending Physician: Leif Delgadillo, *   Primary Care Physician: Alexus Casas NP  Expected Discharge Date: 7/13/2023  Principal Problem:Bradycardia    Subjective:     Hospital Course:   Ms. Luther is a 72y/o AA female with PMHx of diastolic HF, CVA, HLD, breast CA, hx of recurrent PE, AAA s/p repair, HTN, anxiety,  TANNER, NILS, obesity who presented to [facility] ED c/o chest pain and recurrent falls. She does not ambulate regularly with her walker at home. Takes eliquis on a regular basis. Believes chest pain has been constant and worsening. Also reports worsening swelling in her feet not resolving with home lasix.   In the ED, tele showing episodes of sinus bradycardia which patient has experienced in the past. Per last cardiololgy note, shouold not be taking coreg.   Denies syncope.  EKG with NSR, LAFB  Troponin negative    Started on IV lasix   Echo Mar 23' EF nml  Nuclear stress Jan 23' negative for ischemia       7/13/23   Chest pain better today. Improved with gabapentin. Legs are still painful. Will work with PT today. Labs stable today             Review of Systems   Constitutional: Negative for diaphoresis, malaise/fatigue, weight gain and weight loss.   HENT:  Negative for congestion and nosebleeds.    Cardiovascular:  Positive for chest pain. Negative for claudication, cyanosis, dyspnea on exertion, irregular heartbeat, leg swelling, near-syncope, orthopnea, palpitations, paroxysmal nocturnal dyspnea and syncope.   Respiratory:  Negative for cough, hemoptysis, shortness of breath, sleep disturbances due to breathing, snoring, sputum production and wheezing.    Hematologic/Lymphatic: Negative for bleeding problem. Does not bruise/bleed easily.   Skin:  Negative for rash.   Musculoskeletal:  Positive for  joint pain and muscle cramps. Negative for arthritis, back pain, falls and muscle weakness.   Gastrointestinal:  Negative for abdominal pain, constipation, diarrhea, heartburn, hematemesis, hematochezia, melena, nausea and vomiting.   Genitourinary:  Negative for dysuria, hematuria and nocturia.   Neurological:  Negative for excessive daytime sleepiness, dizziness, headaches, light-headedness, loss of balance, numbness, vertigo and weakness.   Objective:     Vital Signs (Most Recent):  Temp: 98.1 °F (36.7 °C) (07/13/23 0755)  Pulse: 66 (07/13/23 0755)  Resp: 16 (07/13/23 0755)  BP: (!) 176/79 (07/13/23 0755)  SpO2: 95 % (07/13/23 0755) Vital Signs (24h Range):  Temp:  [97.8 °F (36.6 °C)-98.1 °F (36.7 °C)] 98.1 °F (36.7 °C)  Pulse:  [] 66  Resp:  [16-17] 16  SpO2:  [95 %-99 %] 95 %  BP: (121-176)/(64-79) 176/79     Weight: (!) 145.9 kg (321 lb 10.4 oz)  Body mass index is 47.5 kg/m².     SpO2: 95 %         Intake/Output Summary (Last 24 hours) at 7/13/2023 1520  Last data filed at 7/13/2023 1507  Gross per 24 hour   Intake 1599.01 ml   Output 2000 ml   Net -400.99 ml       Lines/Drains/Airways       Drain  Duration                  Urethral Catheter 07/12/23 0755 Non-latex;Silicone 16 Fr. 1 day              Peripheral Intravenous Line  Duration                  Peripheral IV - Single Lumen 07/12/23 1900 22 G Anterior;Left Forearm <1 day                       Physical Exam       Significant Labs: BMP:   Recent Labs   Lab 07/12/23  0700 07/13/23  0643   GLU 91 113*    142   K 4.4 3.5   * 107   CO2 21* 25   BUN 23 20   CREATININE 1.3 1.3   CALCIUM 9.2 9.4   MG 2.1 2.0   , CMP   Recent Labs   Lab 07/12/23  0700 07/13/23  0643    142   K 4.4 3.5   * 107   CO2 21* 25   GLU 91 113*   BUN 23 20   CREATININE 1.3 1.3   CALCIUM 9.2 9.4   PROT 6.8 6.8   ALBUMIN 3.2* 3.2*   BILITOT 0.3 0.3   ALKPHOS 96 110   AST 15 12   ALT 12 10   ANIONGAP 10 10   , CBC   Recent Labs   Lab 07/12/23  0700  07/13/23  0643   WBC 7.85 6.86   HGB 11.6* 11.1*   HCT 37.8 35.3*    234   , INR No results for input(s): INR, PROTIME in the last 48 hours., Lipid Panel No results for input(s): CHOL, HDL, LDLCALC, TRIG, CHOLHDL in the last 48 hours., and Troponin   Recent Labs   Lab 07/11/23  1714 07/12/23  0304   TROPONINI 0.024 0.025       Significant Imaging: Echocardiogram: 2D echo with color flow doppler: No results found. However, due to the size of the patient record, not all encounters were searched. Please check Results Review for a complete set of results. and Transthoracic echo (TTE) complete (Cupid Only):   Results for orders placed or performed during the hospital encounter of 03/09/23   Echo   Result Value Ref Range    BSA 2.58 m2    TDI SEPTAL 0.07 m/s    LV LATERAL E/E' RATIO 5.89 m/s    LV SEPTAL E/E' RATIO 7.57 m/s    LA WIDTH 4.20 cm    IVC diameter 1.5 cm    Left Ventricular Outflow Tract Mean Velocity 1.02 cm/s    Left Ventricular Outflow Tract Mean Gradient 4.30 mmHg    TV mean gradient 19 mmHg    TDI LATERAL 0.09 m/s    LVIDd 4.41 3.5 - 6.0 cm    IVS 1.60 (A) 0.6 - 1.1 cm    Posterior Wall 1.49 (A) 0.6 - 1.1 cm    Ao root annulus 3.15 cm    LVIDs 2.57 2.1 - 4.0 cm    FS 42 28 - 44 %    LA volume 67.89 cm3    STJ 2.94 cm    Ascending aorta 2.67 cm    LV mass 280.24 g    LA size 3.64 cm    TAPSE 2.00 cm    Left Ventricle Relative Wall Thickness 0.68 cm    AV mean gradient 6 mmHg    AV valve area 2.86 cm2    AV Velocity Ratio 0.79     AV index (prosthetic) 0.91     MV valve area p 1/2 method 2.79 cm2    E/A ratio 0.82     Mean e' 0.08 m/s    E wave deceleration time 272.20 msec    IVRT 76.12 msec    LVOT diameter 2.00 cm    LVOT area 3.1 cm2    LVOT peak josé 1.19 m/s    LVOT peak VTI 28.80 cm    Ao peak josé 1.50 m/s    Ao VTI 31.6 cm    RVOT peak josé 1.04 m/s    RVOT peak VTI 25.1 cm    LVOT stroke volume 90.43 cm3    AV peak gradient 9 mmHg    PV mean gradient 2.75 mmHg    E/E' ratio 6.63 m/s    MV Peak  E Henry 0.53 m/s    TR Max Henry 2.60 m/s    MV stenosis pressure 1/2 time 78.94 ms    MV Peak A Henry 0.65 m/s    LV Systolic Volume 23.99 mL    LV Systolic Volume Index 9.8 mL/m2    LV Diastolic Volume 87.94 mL    LV Diastolic Volume Index 35.75 mL/m2    LA Volume Index 27.6 mL/m2    LV Mass Index 114 g/m2    RA Major Axis 3.89 cm    Left Atrium Minor Axis 5.40 cm    Left Atrium Major Axis 5.06 cm    Triscuspid Valve Regurgitation Peak Gradient 27 mmHg    RA Width 3.00 cm    Right Atrial Pressure (from IVC) 3 mmHg    EF 65 %    TV rest pulmonary artery pressure 30 mmHg    Narrative    · The left ventricle is normal in size with concentric hypertrophy and   normal systolic function.  · The estimated ejection fraction is 65%.  · Normal left ventricular diastolic function.  · Normal right ventricular size with normal right ventricular systolic   function.  · Normal central venous pressure (3 mmHg).  · The estimated PA systolic pressure is 30 mmHg.        Assessment and Plan:       * Bradycardia  Chronic intermittent runs of bradycardia  BB stopped in the past    Frequent falls  Counseled on using walker regularly to ambulate     Chronic diastolic congestive heart failure  Patient is identified as having Diastolic (HFpEF) heart failure that is Acute on chronic. CHF is currently controlled. Latest ECHO performed and demonstrates- Results for orders placed during the hospital encounter of 03/09/23    Echo    Interpretation Summary  · The left ventricle is normal in size with concentric hypertrophy and normal systolic function.  · The estimated ejection fraction is 65%.  · Normal left ventricular diastolic function.  · Normal right ventricular size with normal right ventricular systolic function.  · Normal central venous pressure (3 mmHg).  · The estimated PA systolic pressure is 30 mmHg.  . Continue Nitrate/Vasodilator and monitor clinical status closely. Monitor on telemetry. Patient is on CHF pathway.  Monitor strict Is&Os  and daily weights.  Place on fluid restriction of 1.5 L. Cardiology has been consulted. Continue to stress to patient importance of self efficacy and  on diet for CHF. Last BNP reviewed- and noted below   Recent Labs   Lab 07/11/23  1317   *     Continue IV laisx for 1 more dose  Then oral torsemide 20 mg daily  Continue entresto, amlodipine   Monitor strict I/Os      7/13/23  Can switch to oral torsemide   Continue entresto, amlodipine   Cannot increase entresto given labile BP    History of pulmonary embolism  Continue eliquis    Other chest pain  Atypical  All over pain  Will start gabapentin (has helped pain in the past)    7/13/23  Improved with gabapentin, can titrate up as tolerated     Major depressive disorder, recurrent episode, moderate with anxious distress  Will stop buspar  Will start wellbutrin     7/13/23  Continue wellbutrin      Hypertension  Stable  Continue home medications      Malignant neoplasm of upper-outer quadrant of right breast in female, estrogen receptor positive  F/u with Hem/onc        VTE Risk Mitigation (From admission, onward)         Ordered     apixaban tablet 5 mg  2 times daily         07/11/23 3986     Reason for No Pharmacological VTE Prophylaxis  Once        Question:  Reasons:  Answer:  Already adequately anticoagulated on oral Anticoagulants    07/11/23 1512     IP VTE HIGH RISK PATIENT  Once         07/11/23 1512     Place sequential compression device  Until discontinued         07/11/23 1512                Arlene Hobbs MD  Cardiology  [facility]

## 2023-07-13 NOTE — PLAN OF CARE
07/13/23 1201   Discharge Reassessment   Assessment Type Discharge Planning Reassessment   Did the patient's condition or plan change since previous assessment? Yes   Discharge Plan discussed with: Patient   Communicated MARTA with patient/caregiver Yes   Discharge Plan A Skilled Nursing Facility   DME Needed Upon Discharge  none   Transition of Care Barriers None   Why the patient remains in the hospital Requires continued medical care   Post-Acute Status   Post-Acute Authorization Placement   Post-Acute Placement Status Referrals Sent   Home Health Status Set-up Complete/Auth obtained   Discharge Delays None known at this time     D/c plan changed from HH to SNF.     Pt is agreeable to SNF placement. Sw sent referrals; pending acceptance, pt selection, and auth.

## 2023-07-13 NOTE — SUBJECTIVE & OBJECTIVE
Interval History:     Review of Systems  Objective:     Vital Signs (Most Recent):  Temp: 98.2 °F (36.8 °C) (07/13/23 1627)  Pulse: 71 (07/13/23 1627)  Resp: 17 (07/13/23 1627)  BP: (!) 183/83 (07/13/23 1627)  SpO2: 98 % (07/13/23 1627) Vital Signs (24h Range):  Temp:  [97.8 °F (36.6 °C)-98.2 °F (36.8 °C)] 98.2 °F (36.8 °C)  Pulse:  [] 71  Resp:  [16-17] 17  SpO2:  [95 %-99 %] 98 %  BP: (124-183)/(64-83) 183/83     Weight: (!) 145.9 kg (321 lb 10.4 oz)  Body mass index is 47.5 kg/m².    Intake/Output Summary (Last 24 hours) at 7/13/2023 1706  Last data filed at 7/13/2023 1507  Gross per 24 hour   Intake 1599.01 ml   Output 2000 ml   Net -400.99 ml         Physical Exam  Vitals and nursing note reviewed.   Constitutional:       General: She is not in acute distress.     Appearance: She is morbidly obese. She is not ill-appearing.   HENT:      Head: Normocephalic and atraumatic.      Right Ear: External ear normal.      Left Ear: External ear normal.   Eyes:      Pupils: Pupils are equal, round, and reactive to light.   Cardiovascular:      Rate and Rhythm: Normal rate and regular rhythm.   Pulmonary:      Effort: Pulmonary effort is normal. No accessory muscle usage or respiratory distress.      Breath sounds: No wheezing.   Abdominal:      General: There is no distension.      Palpations: Abdomen is soft.      Tenderness: There is no abdominal tenderness. There is no guarding or rebound.   Musculoskeletal:      Right shoulder: Tenderness present.      Cervical back: Neck supple.      Right lower leg: Edema present.      Left lower leg: Edema present.   Skin:     General: Skin is warm and dry.   Neurological:      Mental Status: She is alert and oriented to person, place, and time. Mental status is at baseline.   Psychiatric:         Mood and Affect: Mood is anxious.           Significant Labs: All pertinent labs within the past 24 hours have been reviewed.  Recent Lab Results         07/13/23  0687         Albumin 3.2       Alkaline Phosphatase 110       ALT 10       Anion Gap 10       AST 12       Baso # 0.05       Basophil % 0.7       BILIRUBIN TOTAL 0.3  Comment: For infants and newborns, interpretation of results should be based  on gestational age, weight and in agreement with clinical  observations.    Premature Infant recommended reference ranges:  Up to 24 hours.............<8.0 mg/dL  Up to 48 hours............<12.0 mg/dL  3-5 days..................<15.0 mg/dL  6-29 days.................<15.0 mg/dL         BUN 20       Calcium 9.4       Chloride 107       CO2 25       Creatinine 1.3       Differential Method Automated       eGFR 44       Eos # 0.2       Eosinophil % 2.2       Glucose 113       Gran # (ANC) 3.7       Gran % 53.6       Hematocrit 35.3       Hemoglobin 11.1       Immature Grans (Abs) 0.02  Comment: Mild elevation in immature granulocytes is non specific and   can be seen in a variety of conditions including stress response,   acute inflammation, trauma and pregnancy. Correlation with other   laboratory and clinical findings is essential.         Immature Granulocytes 0.3       Lymph # 2.3       Lymph % 32.9       Magnesium 2.0       MCH 25.3       MCHC 31.4       MCV 81       Mono # 0.7       Mono % 10.3       MPV 9.6       nRBC 0       Platelets 234       Potassium 3.5       PROTEIN TOTAL 6.8       RBC 4.38       RDW 14.9       Sodium 142       WBC 6.86               Significant Imaging: I have reviewed all pertinent imaging results/findings within the past 24 hours.

## 2023-07-13 NOTE — ASSESSMENT & PLAN NOTE
Patient is identified as having Diastolic (HFpEF) heart failure that is Acute on chronic. CHF is currently controlled. Latest ECHO performed and demonstrates- Results for orders placed during the hospital encounter of 03/09/23    Echo    Interpretation Summary  · The left ventricle is normal in size with concentric hypertrophy and normal systolic function.  · The estimated ejection fraction is 65%.  · Normal left ventricular diastolic function.  · Normal right ventricular size with normal right ventricular systolic function.  · Normal central venous pressure (3 mmHg).  · The estimated PA systolic pressure is 30 mmHg.  . Continue Nitrate/Vasodilator and monitor clinical status closely. Monitor on telemetry. Patient is on CHF pathway.  Monitor strict Is&Os and daily weights.  Place on fluid restriction of 1.5 L. Cardiology has been consulted. Continue to stress to patient importance of self efficacy and  on diet for CHF. Last BNP reviewed- and noted below   Recent Labs   Lab 07/11/23  1317   *     Continue IV laisx for 1 more dose  Then oral torsemide 20 mg daily  Continue entresto, amlodipine   Monitor strict I/Os      7/13/23  Can switch to oral torsemide   Continue entresto, amlodipine   Cannot increase entresto given labile BP

## 2023-07-13 NOTE — CONSULTS
"                      Food & Nutrition Education    Diet Education: Heart Failure  Time Spent: 30 minutes    Learners: Pt    Nutrition Education provided with handouts:  Heart Failure Nutrition Therapy, Fluid-Restricted Diet, Low-Sodium Nutrition Therapy ([email or web address])    Comments:  Dietitian educated patient on low sodium diet and fluid restriction related to hospital diagnosis. Discussed the importance of limiting sodium to 2,000 mg per day and reading food labels to avoid further complications of CHF. Discussed using salt free seasonings (. Ranjan [employer] Chachere "No Salt") and other herbs and spices in meals to enhance flavor without additional sodium. Discussed 1500 ml fluid restriction per MD and dietary sources of fluid. Dietitian recommended using a cup with measurements for fluids and to try to consume small sips spread throughout the day rather than a lot at one time.    The pt remained engaged throughout entire nutrition eduction. Pt states this is her first time being recommended to follow fluid restrictions. Dicussed barriers that she believe may prevent her from being consistent with fluid restrictions such as being very thirsty throughout the day. The pt states she normally consumes 8-10 500 mL bottle/day. However, the pt states she does understand these restrictions as presented to her and states "I want to live so I will begin to account for all fluids consumed throughout the day." Discussed limiting consumption of fast foods. Discussed consuming more fresh fruits and vegetables which the pt states, her  grows a garden. Pt states her appetite is improving, w/ PO intake of 75%. Dietitian will continue to monitor.    NFPE not performed, pt appears well nourished.  All questions and concerns answered.  Provided handout with dietitian's contact information.  Please re-consult as needed.    Thank You!   De Cummings, Registration Eligible, Provisional LDN  "

## 2023-07-13 NOTE — ASSESSMENT & PLAN NOTE
Patient is identified as having Diastolic (HFpEF) heart failure that is Acute on chronic. CHF is currently uncontrolled due to Continued edema of extremities. Latest ECHO performed and demonstrates- Results for orders placed during the hospital encounter of 03/09/23    Echo    Interpretation Summary  · The left ventricle is normal in size with concentric hypertrophy and normal systolic function.  · The estimated ejection fraction is 65%.  · Normal left ventricular diastolic function.  · Normal right ventricular size with normal right ventricular systolic function.  · Normal central venous pressure (3 mmHg).  · The estimated PA systolic pressure is 30 mmHg.  . Continue Furosemide and ARNI and monitor clinical status closely. Monitor on telemetry. Patient is on CHF pathway.  Monitor strict Is&Os and daily weights.  Place on fluid restriction of 1.5 L. Cardiology HAS BEEN consulted. Continue to stress to patient importance of self efficacy and  on diet for CHF. Last BNP reviewed- and noted below   Recent Labs   Lab 07/11/23  1317   *   .  Cont  transition to oral torsemide 20 mg daily

## 2023-07-13 NOTE — ASSESSMENT & PLAN NOTE
Body mass index is 47.5 kg/m². Morbid obesity complicates all aspects of disease management from diagnostic modalities to treatment. Weight loss encouraged and health benefits explained to patient.

## 2023-07-13 NOTE — PT/OT/SLP PROGRESS
Physical Therapy  Treatment    Susu Luther   MRN: [id number]   Admitting Diagnosis: Bradycardia    PT Received On: 07/13/23  PT Start Time: 1110     PT Stop Time: 1140    PT Total Time (min): 30 min       Billable Minutes:  Gait Training 10 and Therapeutic Activity 20    Treatment Type: Treatment  PT/PTA: PTA     Number of PTA visits since last PT visit: 1       General Precautions: Standard, fall  Orthopedic Precautions: N/A  Braces: N/A  Respiratory Status: Room air         Subjective:  Communicated with charge nurse, Lucy, and completed Epic chart review prior to session.  Patient agreed to PT session and requested assistance to get to the bathroom.     Pain/Comfort  Pain Rating 1: 0/10  Pain Rating Post-Intervention 1: 0/10    Objective:   Patient found with: telemetry, peripheral IV, atkins catheter    Supine > sit EOB: Min A     Forward scoot towards EOB: SBA    STS from EOB > RW: Min A (VC for hand placement)    8ft x2 trials w/ RW Min A (EOB > toilet > chair)    Stand pivot T/F to toilet w/ RW: Min A     STS from toilet > RW: Min A     Stand pivot T/F to chair w/ RW: Min A     Completed x10 reps AROM TE to BLE: LAQ, Hip Flex, AP   Intermittent cues given as needed to maintain correct form during repetitions    Educated patient on importance of increased tolerance to upright position and direct impact on CV endurance and strength. Patient encouraged to sit up in chair/ EOB, for a minimum of 2 consecutive hours, 3x per day. Encouraged patient to perform AROM TE to BLE throughout the day within all available planes of motion. Re enforced importance of utilizing call light to meet needs in room and not attempt to get up without staff assistance. Patient verbalized understanding and agreed to comply.      AM-PAC 6 CLICK MOBILITY  How much help from another person does this patient currently need?   1 = Unable, Total/Dependent Assistance  2 = A lot, Maximum/Moderate Assistance  3 = A little, Minimum/Contact  Guard/Supervision  4 = None, Modified [place]/Independent    Turning over in bed (including adjusting bedclothes, sheets and blankets)?: 3  Sitting down on and standing up from a chair with arms (e.g., wheelchair, bedside commode, etc.): 3  Moving from lying on back to sitting on the side of the bed?: 3  Moving to and from a bed to a chair (including a wheelchair)?: 3  Need to walk in hospital room?: 3  Climbing 3-5 steps with a railing?: 1 (NT)  Basic Mobility Total Score: 16    AM-PAC Raw Score CMS G-Code Modifier Level of Impairment Assistance   6 % Total / Unable   7 - 9 CM 80 - 100% Maximal Assist   10 - 14 CL 60 - 80% Moderate Assist   15 - 19 CK 40 - 60% Moderate Assist   20 - 22 CJ 20 - 40% Minimal Assist   23 CI 1-20% SBA / CGA   24 CH 0% Independent/ Mod I     Patient left up in chair with call button in reach.    Assessment:  Susu Luther is a 73 y.o. female with a medical diagnosis of Bradycardia and presents with overall decline in functional mobility. Patient would continue to benefit from skilled PT to address functional limitations listed below in order to return to PLOF/decrease caregiver burden. Patient tolerated today's session well but continues to demonstrate limited endurance accompanied by SOB with gait and transfer training. Patient expressed motivation to progress towards goals established within PT POC. Discussed benefits of SNF placement vs going home with HH and patient agreeable to change in recommendation. MD notified of change as well.     Rehab identified problem list/impairments: weakness, impaired endurance, impaired self care skills, impaired functional mobility, gait instability, impaired balance, decreased coordination, decreased lower extremity function, decreased safety awareness, decreased ROM, impaired cardiopulmonary response to activity    Rehab potential is fair.    Activity tolerance: Fair    Discharge recommendations: nursing facility, skilled (SPOKE  W/ [facility] PT RE: CHANGE IN D/C REC)      Barriers to discharge:      Equipment recommendations: to be determined by next level of care     GOALS:   Multidisciplinary Problems       Physical Therapy Goals          Problem: Physical Therapy    Goal Priority Disciplines Outcome Goal Variances Interventions   Physical Therapy Goal     PT, PT/OT      Description: LTG'S TO BE MET IN 14 DAYS (7-26-23)  PT WILL REQUIRE [facility] FOR BED MOBILITY  PT WILL REQUIRE [facility] FOR BED<>CHAIR TF'S  PT WILL  FEET WITH RW AND CGA                         PLAN:    Patient to be seen 3 x/week to address the above listed problems via gait training, therapeutic activities, therapeutic exercises  Plan of Care expires: 07/26/23  Plan of Care reviewed with: patient         07/13/2023

## 2023-07-13 NOTE — PLAN OF CARE
Discharge cancelled. Discussed poc with pt, pt verbalized understanding  Purposeful rounding every 2 hours  VS stable  Cardiac monitoring in use. Pt is NS.   Blood glucose monitoring   Fall precautions in place, remains injury free  Pain and nausea under control with PRN meds  Accurate I&Os  Family at bedside.  Bed locked at lowest position  Call light within reach  Orders acknowledged  Chart check complete  Will cont with POC

## 2023-07-14 ENCOUNTER — PATIENT OUTREACH (OUTPATIENT)
Dept: ADMINISTRATIVE | Facility: HOSPITAL | Age: 73
End: 2023-07-14
Payer: MEDICARE

## 2023-07-14 VITALS
DIASTOLIC BLOOD PRESSURE: 74 MMHG | BODY MASS INDEX: 43.4 KG/M2 | HEIGHT: 69 IN | TEMPERATURE: 98 F | OXYGEN SATURATION: 95 % | SYSTOLIC BLOOD PRESSURE: 119 MMHG | WEIGHT: 293 LBS | HEART RATE: 74 BPM | RESPIRATION RATE: 16 BRPM

## 2023-07-14 LAB
ALBUMIN SERPL BCP-MCNC: 3.3 G/DL (ref 3.5–5.2)
ALP SERPL-CCNC: 114 U/L (ref 55–135)
ALT SERPL W/O P-5'-P-CCNC: 10 U/L (ref 10–44)
ANION GAP SERPL CALC-SCNC: 12 MMOL/L (ref 8–16)
AST SERPL-CCNC: 11 U/L (ref 10–40)
BASOPHILS # BLD AUTO: 0.05 K/UL (ref 0–0.2)
BASOPHILS NFR BLD: 0.7 % (ref 0–1.9)
BILIRUB SERPL-MCNC: 0.3 MG/DL (ref 0.1–1)
BUN SERPL-MCNC: 17 MG/DL (ref 8–23)
CALCIUM SERPL-MCNC: 9.7 MG/DL (ref 8.7–10.5)
CHLORIDE SERPL-SCNC: 105 MMOL/L (ref 95–110)
CO2 SERPL-SCNC: 26 MMOL/L (ref 23–29)
CREAT SERPL-MCNC: 1.4 MG/DL (ref 0.5–1.4)
DIFFERENTIAL METHOD: ABNORMAL
EOSINOPHIL # BLD AUTO: 0.2 K/UL (ref 0–0.5)
EOSINOPHIL NFR BLD: 2.2 % (ref 0–8)
ERYTHROCYTE [DISTWIDTH] IN BLOOD BY AUTOMATED COUNT: 14.9 % (ref 11.5–14.5)
EST. GFR  (NO RACE VARIABLE): 40 ML/MIN/1.73 M^2
GLUCOSE SERPL-MCNC: 106 MG/DL (ref 70–110)
HCT VFR BLD AUTO: 38.5 % (ref 37–48.5)
HGB BLD-MCNC: 12.1 G/DL (ref 12–16)
IMM GRANULOCYTES # BLD AUTO: 0.02 K/UL (ref 0–0.04)
IMM GRANULOCYTES NFR BLD AUTO: 0.3 % (ref 0–0.5)
LYMPHOCYTES # BLD AUTO: 2.8 K/UL (ref 1–4.8)
LYMPHOCYTES NFR BLD: 36.2 % (ref 18–48)
MAGNESIUM SERPL-MCNC: 2 MG/DL (ref 1.6–2.6)
MCH RBC QN AUTO: 25.1 PG (ref 27–31)
MCHC RBC AUTO-ENTMCNC: 31.4 G/DL (ref 32–36)
MCV RBC AUTO: 80 FL (ref 82–98)
MONOCYTES # BLD AUTO: 0.8 K/UL (ref 0.3–1)
MONOCYTES NFR BLD: 9.8 % (ref 4–15)
NEUTROPHILS # BLD AUTO: 3.9 K/UL (ref 1.8–7.7)
NEUTROPHILS NFR BLD: 50.8 % (ref 38–73)
NRBC BLD-RTO: 0 /100 WBC
PLATELET # BLD AUTO: 255 K/UL (ref 150–450)
PMV BLD AUTO: 9.9 FL (ref 9.2–12.9)
POTASSIUM SERPL-SCNC: 3.8 MMOL/L (ref 3.5–5.1)
PROT SERPL-MCNC: 7.2 G/DL (ref 6–8.4)
RBC # BLD AUTO: 4.83 M/UL (ref 4–5.4)
SODIUM SERPL-SCNC: 143 MMOL/L (ref 136–145)
WBC # BLD AUTO: 7.65 K/UL (ref 3.9–12.7)

## 2023-07-14 PROCEDURE — 83735 ASSAY OF MAGNESIUM: CPT | Mod: HCNC | Performed by: INTERNAL MEDICINE

## 2023-07-14 PROCEDURE — 63600175 PHARM REV CODE 636 W HCPCS: Mod: HCNC | Performed by: NURSE PRACTITIONER

## 2023-07-14 PROCEDURE — 96366 THER/PROPH/DIAG IV INF ADDON: CPT

## 2023-07-14 PROCEDURE — 80053 COMPREHEN METABOLIC PANEL: CPT | Mod: HCNC | Performed by: INTERNAL MEDICINE

## 2023-07-14 PROCEDURE — 85025 COMPLETE CBC W/AUTO DIFF WBC: CPT | Mod: HCNC | Performed by: INTERNAL MEDICINE

## 2023-07-14 PROCEDURE — 25000003 PHARM REV CODE 250: Mod: HCNC | Performed by: INTERNAL MEDICINE

## 2023-07-14 PROCEDURE — 25000003 PHARM REV CODE 250: Mod: HCNC | Performed by: STUDENT IN AN ORGANIZED HEALTH CARE EDUCATION/TRAINING PROGRAM

## 2023-07-14 PROCEDURE — 36415 COLL VENOUS BLD VENIPUNCTURE: CPT | Mod: HCNC | Performed by: INTERNAL MEDICINE

## 2023-07-14 PROCEDURE — G0378 HOSPITAL OBSERVATION PER HR: HCPCS | Mod: HCNC

## 2023-07-14 PROCEDURE — 25000003 PHARM REV CODE 250: Mod: HCNC | Performed by: NURSE PRACTITIONER

## 2023-07-14 RX ORDER — CIPROFLOXACIN 500 MG/1
500 TABLET ORAL 2 TIMES DAILY
Qty: 6 TABLET | Refills: 0 | Status: SHIPPED | OUTPATIENT
Start: 2023-07-15 | End: 2023-07-17

## 2023-07-14 RX ORDER — GABAPENTIN 300 MG/1
300 CAPSULE ORAL 3 TIMES DAILY
Qty: 90 CAPSULE | Refills: 1 | Status: SHIPPED | OUTPATIENT
Start: 2023-07-14 | End: 2023-08-02 | Stop reason: SDUPTHER

## 2023-07-14 RX ORDER — TORSEMIDE 20 MG/1
20 TABLET ORAL DAILY
Qty: 30 TABLET | Refills: 0 | Status: SHIPPED | OUTPATIENT
Start: 2023-07-15 | End: 2023-08-02 | Stop reason: SDUPTHER

## 2023-07-14 RX ORDER — TAMSULOSIN HYDROCHLORIDE 0.4 MG/1
0.4 CAPSULE ORAL DAILY
Qty: 30 CAPSULE | Refills: 0 | Status: SHIPPED | OUTPATIENT
Start: 2023-07-15 | End: 2023-07-17

## 2023-07-14 RX ORDER — BUPROPION HYDROCHLORIDE 150 MG/1
150 TABLET ORAL DAILY
Qty: 30 TABLET | Refills: 11 | Status: SHIPPED | OUTPATIENT
Start: 2023-07-15 | End: 2023-10-06 | Stop reason: SDUPTHER

## 2023-07-14 RX ORDER — METHOCARBAMOL 500 MG/1
500 TABLET, FILM COATED ORAL 3 TIMES DAILY PRN
Qty: 30 TABLET | Refills: 0 | Status: SHIPPED | OUTPATIENT
Start: 2023-07-14 | End: 2023-07-24

## 2023-07-14 RX ADMIN — AMLODIPINE BESYLATE 10 MG: 10 TABLET ORAL at 09:07

## 2023-07-14 RX ADMIN — TORSEMIDE 20 MG: 10 TABLET ORAL at 09:07

## 2023-07-14 RX ADMIN — SACUBITRIL AND VALSARTAN 1 TABLET: 24; 26 TABLET, FILM COATED ORAL at 09:07

## 2023-07-14 RX ADMIN — APIXABAN 5 MG: 2.5 TABLET, FILM COATED ORAL at 09:07

## 2023-07-14 RX ADMIN — POLYETHYLENE GLYCOL 3350 17 G: 17 POWDER, FOR SOLUTION ORAL at 09:07

## 2023-07-14 RX ADMIN — ATORVASTATIN CALCIUM 20 MG: 10 TABLET, FILM COATED ORAL at 09:07

## 2023-07-14 RX ADMIN — CEFTRIAXONE 1 G: 1 INJECTION, POWDER, FOR SOLUTION INTRAMUSCULAR; INTRAVENOUS at 09:07

## 2023-07-14 RX ADMIN — TAMSULOSIN HYDROCHLORIDE 0.4 MG: 0.4 CAPSULE ORAL at 09:07

## 2023-07-14 RX ADMIN — METHOCARBAMOL 750 MG: 750 TABLET ORAL at 09:07

## 2023-07-14 RX ADMIN — BUPROPION HYDROCHLORIDE 150 MG: 150 TABLET, FILM COATED, EXTENDED RELEASE ORAL at 09:07

## 2023-07-14 RX ADMIN — GABAPENTIN 300 MG: 300 CAPSULE ORAL at 09:07

## 2023-07-14 RX ADMIN — ALPRAZOLAM 0.5 MG: 0.5 TABLET ORAL at 09:07

## 2023-07-14 NOTE — PLAN OF CARE
[facility]  Discharge Final Note    Primary Care Provider: Alexus Casas NP    Expected Discharge Date: 7/14/2023    Final Discharge Note (most recent)       Final Note - 07/14/23 1002          Final Note    Assessment Type Final Discharge Note     Anticipated Discharge Disposition Home-Health Care [facility]     Hospital Resources/Appts/Education Provided Appointments scheduled by Navigator/Coordinator;Appointments scheduled and added to AVS        Post-Acute Status    Post-Acute Authorization Home Health     Home Health Status Set-up Complete/Auth obtained     Discharge Delays None known at this time                     After-discharge care                Home Medical Care       [facility]   Service: Home Health Services    [address]  [facility] [address]   Phone: [phone]                     Sw met with pt this morning to notify her that there are no accepting in-network SNF's at this time. Pt verbalized her understanding and is agreeable to d/c home with [facility] .     PCP appt scheduled and added to AVS.     No additional d/c needs at this time.

## 2023-07-14 NOTE — NURSING
Pt is ready for discharge. AVS explained. All questions and concerns addressed. Teach back done. Pt verbalized understanding. IV removed.     Will discharge with atkins catheter. Connected to leg bag. Will fu with urology

## 2023-07-14 NOTE — DISCHARGE SUMMARY
[facility] Medicine  Discharge Summary      Patient Name: Susu Luther  MRN: [id number]  KIM: [id number]  Patient Class: OP- Observation  Admission Date: 7/11/2023  Hospital Length of Stay: 0 days  Discharge Date and Time:  07/14/2023 11:09 AM  Attending Physician: Leif Delgadillo, *   Discharging Provider: Leif Delgadillo MD  Primary Care Provider: Alexus Casas NP    Primary Care Team: [facility] MEDICINE D    HPI:   Susu Luther is a 73 y.o. female with Chronic diastolic congestive heart failure, History of repair of aneurysm of abdominal aorta using endovascular stent graft, psychiatric care, Hypertension, Major depressive disorder, Malignant neoplasm of upper-outer quadrant of right breast in female, estrogen receptor positive, Sleep apnea, Stroke  who presented to ED on 7/11/2023 with weakness.  Patient reports that she was feeling extremely short of breath this morning, subsequently a friend called EMS.  Additionally she has been having a lot of falls at home including last Saturday.  Reports that she fell onto her right shoulder and since then has been having a lot of pain on her arm and shoulder.  Additionally has been having worsening swelling of her legs making it difficult and painful for her to walk.  States that she had recently been doubling up on her home Lasix dose per the advice of Cardiology.  Has not had any chest pain.  Admits to ongoing depression and anxiety which has not been well-controlled.  Per EMS report, patient was found to be bradycardic and hypotensive when they arrived.  Subsequently she was given IV fluid and Narcan en route.  On arrival at ED noted to have /67, heart rate 45.  Patient placed in observation on hospital medicine service for further management.  Cardiology consulted.    Shortly after patient was admitted, she developed acute onset of severe chest pain.  EKG showed sinus bradycardia, with no significant change  compared to EKG obtained earlier in the day.  On reexamination, chest pain reproducible was tearful and appears significantly more anxious than she has been previously.    PCP: Alexus Casas NP    SDM:  Daughter, Darío Rodriguez        * No surgery found *      Hospital Course:   Bradycardia resolved, BP improved. Started on IV Lasix. Patient continues to report intermittent chest pain that's reproducible as well as LE pain.  PT/OT evaluated patient and recommended HH vs SNF pending progress with acute intervention  UA concerning for UTI with positive nitrite. Hospital course complicated by urinary retention, which may be associated with UTI requiring in and out catheterization x2. Subsequently had atkins placed.  Discussed with patient recommendation to take Buspar dose scheduled rather than as needed and to follow up with psych as she states that she has not seen them in a couple of years.  7/13 NAEON . Atkins in placed . She is  complaining of anxiety and HA . She denies any chest pain or SOB . Medication adjuted . PT/OT rec SNF .  7/14 Pt was seen and examined at bedside . She was determine dot be suitable for d/c   She was admitted with a Dx of generalized weakness , Bradycardia ,UTI , urinary retention and generalized weakness .  Urine cx (+) for e.coli sensitive to rocephin and flouroquinolones . S/P rocephin 1 gr qd x 2 and d/c on cipro 500 mg po bid x 3 days . Cardiology consulted for bradycardia . Pt medication adjusted , Buspar changed to Wellbutrin , Lyrica change to gabapentin  and flexeril change to robaxin .  PT/O rec SNF however pr decided to go home with HH .  Pt will be d/c with a atkins cath due to urinary retention . She will f/u with urology and her PCP in 1 to 2 weeks        Goals of Care Treatment Preferences:  Code Status: Full Code    Health care agent:  Campos Luther [phone], 2: daughter Darío Kay [phone], 3: daughter Deepti Luther  [phone]  Health care agent number: No value filed.                   Consults:   Consults (From admission, onward)        Status Ordering Provider     Inpatient consult to Social Work  Once        Provider:  (Not yet assigned)    Completed ZARA FORMAN     Inpatient consult to Social Work/Case Management  Once        Provider:  (Not yet assigned)    Completed PHOENIX MONTEMAYOR     Inpatient consult to Registered Dietitian/Nutritionist  Once        Provider:  (Not yet assigned)    Completed PHOENIX MONTEMAYOR     Inpatient consult to Cardiology  Once        Provider:  (Not yet assigned)    Completed PHOENIX MONTEMAYOR          Cardiac/Vascular  * Bradycardia  Patient was noted to have bradycardia with heart rate in the 40s on arrival  She is not on any beta-blocker or any other meds that would cause AV jena blockage  Now resolved  Cardiac monitoring  Cardiology consulted  Etiology unclear      Chronic diastolic congestive heart failure  Patient is identified as having Diastolic (HFpEF) heart failure that is Acute on chronic. CHF is currently uncontrolled due to Continued edema of extremities. Latest ECHO performed and demonstrates- Results for orders placed during the hospital encounter of 03/09/23    Echo    Interpretation Summary  · The left ventricle is normal in size with concentric hypertrophy and normal systolic function.  · The estimated ejection fraction is 65%.  · Normal left ventricular diastolic function.  · Normal right ventricular size with normal right ventricular systolic function.  · Normal central venous pressure (3 mmHg).  · The estimated PA systolic pressure is 30 mmHg.  . Continue Furosemide and ARNI and monitor clinical status closely. Monitor on telemetry. Patient is on CHF pathway.  Monitor strict Is&Os and daily weights.  Place on fluid restriction of 1.5 L. Cardiology HAS BEEN consulted. Continue to stress to patient importance of self efficacy and  on diet for CHF. Last BNP reviewed-  and noted below   Recent Labs   Lab 07/11/23  1317   *   .  Cont  transition to oral torsemide 20 mg daily    Other chest pain  Developed sudden-onset severe localized chest pain shortly after admission  Chest pain reproducible on examination, patient also notably anxious and was tearful  Repeat EKG shows sinus bradycardia with no acute ischemic changes  Serial trop negative  Likely etiology musculoskeletal and possibly worsened by anxiety  Changed robaxin dose to scheduled  Started on gabapentin by cardiology      Hypertension  Reports compliance with her home medication including amlodipine, Lasix  Resume home antihypertensive meds as BP tolerates  Monitor BP      Renal/  UTI (urinary tract infection)  UA consistent with UTI with positive nitrite, trace leukocyte  Urine culture in process  Started on ceftriaxone for empiric treatment    Urinary retention  Patient noted to have urinary retention in the ED with bladder scan showing > 690 mL , straight cath ordered  Per chart review, she had urinary retention during her last hospital admission   Required in and out catheterization x2  Stop oxybutynin  Puckett catheter placed this morning, patient started on Flomax  Bladder training  Referral to urology outpatient          Chronic kidney disease, stage 3a  Renal function appears to be slightly worse above baseline on admission  Improving, near baseline  Strict I&Os  Avoid nephrotoxins and renal dose meds as needed  Monitor renal function        Endocrine  Class 3 severe obesity in adult  Body mass index is 47.5 kg/m². Morbid obesity complicates all aspects of disease management from diagnostic modalities to treatment. Weight loss encouraged and health benefits explained to patient.       Orthopedic  Right shoulder pain  Patient reports frequent falls recently including fall onto her right shoulder last Saturday   Right shoulder x-ray showed no evidence of fracture or dislocation  Robaxin  Percocet  PRN      Other  Weakness  PT/OT rec SNF      Frequent falls  PT/OT recommend HH vs SNF depending on progress  Patient counseled to use walker with ambulation at all times  Concern that polypharmacy is contributory to patient's weakness and frequent falls as she is on Ambien, Percocet, Xanax, Fioricet outpatient  Minimize sedating meds    History of pulmonary embolism  Continue home Eliquis      Major depressive disorder, recurrent episode, moderate with anxious distress  Patient has persistent depression which iscurrently uncontrolled.  She admits that she has not been taking her prescribed BuSpar as scheduled as instructed, has been taking it p.r.n..  Will Continue anti-depressant medications. We will not consult psychiatry at this time. Patient does not display psychosis at this time. Continue to monitor closely and adjust plan of care as needed.    PDMP reviewed, continue home Xanax as needed  Discussed with patient that she should follow up with psych after discharge as she had not seen them in a couple of years, patient stated agreement    NILS (obstructive sleep apnea)  Reports that she is not using CPAP currently after her device was recalled  Monitor respiratory status  Nocturnal O2 as needed        Final Active Diagnoses:    Diagnosis Date Noted POA    PRINCIPAL PROBLEM:  Bradycardia [R00.1] 03/10/2023 Yes    Weakness [R53.1] 07/13/2023 Yes    Frequent falls [R29.6] 07/11/2023 Not Applicable    Right shoulder pain [M25.511] 07/11/2023 Yes    UTI (urinary tract infection) [N39.0] 07/11/2023 Yes    Urinary retention [R33.9] 03/13/2023 Yes    Chronic diastolic congestive heart failure [I50.32] 01/15/2023 Yes    History of pulmonary embolism [Z86.711] 04/28/2022 Yes    Other chest pain [R07.89] 02/15/2022 Yes    Chronic kidney disease, stage 3a [N18.31] 02/01/2022 Yes    Major depressive disorder, recurrent episode, moderate with anxious distress [F33.1] 01/14/2020 Yes    Class 3 severe obesity in  adult [E66.01] 06/28/2019 Yes     Chronic    Hypertension [I10]  Yes    Malignant neoplasm of upper-outer quadrant of right breast in female, estrogen receptor positive [C50.411, Z17.0] 03/14/2019 Not Applicable     Chronic    NILS (obstructive sleep apnea) [G47.33] 11/28/2018 Yes     Chronic      Problems Resolved During this Admission:       Discharged Condition: stable    Disposition: Home-Health Care [facility]    Follow Up:   Contact information for follow-up providers     Alexus Casas NP Follow up in 1 week(s).    Specialty: Family Medicine  Contact information:  [address] [place] [address]  [phone]                   Contact information for after-discharge care     Home Medical Care     [facility] .    Service: Home Health Services  Contact information:  [address] [address]  [phone]                           Patient Instructions:      Ambulatory referral/consult to Outpatient Case Management   Referral Priority: Routine Referral Type: Consultation   Referral Reason: Specialty Services Required   Number of Visits Requested: 1     Ambulatory referral/consult to Home Health   Standing Status: Future   Referral Priority: Routine Referral Type: Home Health   Referral Reason: Specialty Services Required   Requested Specialty: Home Health Services   Number of Visits Requested: 1     Ambulatory referral/consult to [facility]   Standing Status: Future   Referral Priority: Routine Referral Type: Consultation   Referral Reason: Specialty Services Required   Number of Visits Requested: 1     Ambulatory referral/consult to Urology   Standing Status: Future   Referral Priority: Routine Referral Type: Consultation   Referral Reason: Specialty Services Required   Requested Specialty: Urology   Number of Visits Requested: 1     Diet Cardiac     Activity as tolerated       Significant Diagnostic Studies: Labs:    BMP:   Recent Labs   Lab 07/13/23  0643 07/14/23  0618   * 106    143   K 3.5 3.8    105   CO2 25 26   BUN 20 17   CREATININE 1.3 1.4   CALCIUM 9.4 9.7   MG 2.0 2.0   , CMP   Recent Labs   Lab 07/13/23  0643 07/14/23  0618    143   K 3.5 3.8    105   CO2 25 26   * 106   BUN 20 17   CREATININE 1.3 1.4   CALCIUM 9.4 9.7   PROT 6.8 7.2   ALBUMIN 3.2* 3.3*   BILITOT 0.3 0.3   ALKPHOS 110 114   AST 12 11   ALT 10 10   ANIONGAP 10 12    and CBC   Recent Labs   Lab 07/13/23  0643 07/14/23 0618   WBC 6.86 7.65   HGB 11.1* 12.1   HCT 35.3* 38.5    255     Microbiology: Blood Culture No results found for: LABBLOO    Pending Diagnostic Studies:     None         Medications:  Reconciled Home Medications:      Medication List      START taking these medications    buPROPion 150 MG TB24 tablet  Commonly known as: WELLBUTRIN XL  Take 1 tablet (150 mg total) by mouth once daily.  Start taking on: July 15, 2023     ciprofloxacin HCl 500 MG tablet  Commonly known as: CIPRO  Take 1 tablet (500 mg total) by mouth 2 (two) times daily. for 3 days  Start taking on: July 15, 2023     gabapentin 300 MG capsule  Commonly known as: NEURONTIN  Take 1 capsule (300 mg total) by mouth 3 (three) times daily.     methocarbamoL 500 MG Tab  Commonly known as: ROBAXIN  Take 1 tablet (500 mg total) by mouth 3 (three) times daily as needed (muscle spasms).     sacubitriL-valsartan 24-26 mg per tablet  Commonly known as: ENTRESTO  Take 1 tablet by mouth 2 (two) times daily.  Replaces: sacubitriL-valsartan 49-51 mg per tablet     tamsulosin 0.4 mg Cap  Commonly known as: FLOMAX  Take 1 capsule (0.4 mg total) by mouth once daily.  Start taking on: July 15, 2023     torsemide 20 MG Tab  Commonly known as: DEMADEX  Take 1 tablet (20 mg total) by mouth once daily.  Start taking on: July 15, 2023        CONTINUE taking these medications    ALPRAZolam 0.5 MG tablet  Commonly known as: XANAX  Take 1 tablet (0.5 mg  total) by mouth 2 (two) times daily as needed for Anxiety.     amLODIPine 10 MG tablet  Commonly known as: NORVASC  Take 1 tablet (10 mg total) by mouth once daily.     apixaban 5 mg Tab  Commonly known as: ELIQUIS  Take 1 tablet (5 mg total) by mouth 2 (two) times daily.     atorvastatin 20 MG tablet  Commonly known as: LIPITOR  Take 1 tablet (20 mg total) by mouth once daily.     * butalbital-acetaminophen-caffeine -40 mg -40 mg per tablet  Commonly known as: FIORICET, ESGIC  TAKE 1 TABLET DAILY AS NEEDED FOR PAIN OR HEADACHES.     * butalbital-acetaminophen-caffeine -40 mg -40 mg per tablet  Commonly known as: FIORICET, ESGIC  TAKE 1 TABLET DAILY AS NEEDED FOR PAIN OR HEADACHES.     cholecalciferol (vitamin D3) 1,250 mcg (50,000 unit) capsule  Take 1 capsule (50,000 Units total) by mouth once a week.     cloNIDine 0.1 MG tablet  Commonly known as: CATAPRES  Take 2 tablets (0.2 mg total) by mouth 2 (two) times daily as needed (BP>170/95).     diclofenac sodium 1 % Gel  Commonly known as: VOLTAREN  Apply 2 grams topically once daily.     letrozole 2.5 mg Tab  Commonly known as: FEMARA  Take 1 tablet (2.5 mg total) by mouth once daily.     levocetirizine 5 MG tablet  Commonly known as: XYZAL  Take 1 tablet (5 mg total) by mouth every evening.     LIDOcaine 5 %  Commonly known as: LIDODERM  Place 2 patches onto the skin once daily. Remove & Discard patch within 12 hours or as directed by MD     multivitamin per tablet  Commonly known as: THERAGRAN  Take 1 tablet by mouth once daily.     oxyCODONE-acetaminophen 7.5-325 mg per tablet  Commonly known as: PERCOCET  Take 1 tablet by mouth every 6 (six) hours as needed for Pain.     ranolazine 1,000 mg Tb12  Commonly known as: RANEXA  Take 1 tablet (1,000 mg total) by mouth 2 (two) times daily.     zolpidem 5 MG Tab  Commonly known as: AMBIEN  Take 1 tablet (5 mg total) by mouth nightly as needed (sleep).         * This list has 2 medication(s) that  are the same as other medications prescribed for you. Read the directions carefully, and ask your doctor or other care provider to review them with you.            STOP taking these medications    busPIRone 15 MG tablet  Commonly known as: BUSPAR     DULoxetine 30 MG capsule  Commonly known as: CYMBALTA     furosemide 40 MG tablet  Commonly known as: LASIX     oxybutynin 5 MG Tab  Commonly known as: DITROPAN     pregabalin 100 MG capsule  Commonly known as: LYRICA     sacubitriL-valsartan 49-51 mg per tablet  Commonly known as: ENTRESTO  Replaced by: sacubitriL-valsartan 24-26 mg per tablet     tiZANidine 4 MG tablet  Commonly known as: ZANAFLEX            Indwelling Lines/Drains at time of discharge:   Lines/Drains/Airways     Drain  Duration                Urethral Catheter 07/12/23 7049 Non-latex;Silicone 16 Fr. 2 days                Time spent on the discharge of patient: 30 minutes         Leif Delgadillo MD  Department of Hospital Medicine  [facility]

## 2023-07-14 NOTE — PROGRESS NOTES
Called patient to confirm hospital follow up scheduled on 7/17/2023.   Patient is confirmed. She requested appt be moved a little later in am. Appt scheduled for 7/17/23.

## 2023-07-14 NOTE — PLAN OF CARE
Problem: Adult Inpatient Plan of Care  Goal: Plan of Care Review  Outcome: Ongoing, Progressing     Problem: Adult Inpatient Plan of Care  Goal: Patient-Specific Goal (Individualized)  Outcome: Ongoing, Progressing  Flowsheets (Taken 7/14/2023 0137)  Anxieties, Fears or Concerns: anxious  Individualized Care Needs: ice     Problem: Bariatric Environmental Safety  Goal: Safety Maintained with Care  Outcome: Ongoing, Progressing     Problem: Infection  Goal: Absence of Infection Signs and Symptoms  Outcome: Ongoing, Progressing     Problem: Skin Injury Risk Increased  Goal: Skin Health and Integrity  Outcome: Ongoing, Progressing    Discussed POC with pt and daughter, verbalized understanding.  Patient remains free from injury. Safety precautions maintained. No s/s of acute distress.  Purposeful rounding every two hours.   Pain controlled per MD order.   Cardiac monitoring in place, tele box number [id number]  Diet orders continued, pt diet: low sodium, 1500 mL fluid restriction  Vital signs continued per orders this shift.   Q2 turning refused, pt educated  Puckett care continued this shift.   Chart and orders review completed. Pt education about care completed.

## 2023-07-14 NOTE — ASSESSMENT & PLAN NOTE
Reports compliance with her home medication including amlodipine, Lasix  Resume home antihypertensive meds as BP tolerates  Monitor BP

## 2023-07-17 ENCOUNTER — OFFICE VISIT (OUTPATIENT)
Dept: FAMILY MEDICINE | Facility: CLINIC | Age: 73
End: 2023-07-17
Payer: MEDICARE

## 2023-07-17 ENCOUNTER — TELEPHONE (OUTPATIENT)
Dept: CARDIOLOGY | Facility: CLINIC | Age: 73
End: 2023-07-17
Payer: MEDICARE

## 2023-07-17 ENCOUNTER — OFFICE VISIT (OUTPATIENT)
Dept: UROLOGY | Facility: CLINIC | Age: 73
End: 2023-07-17
Payer: MEDICARE

## 2023-07-17 VITALS
DIASTOLIC BLOOD PRESSURE: 84 MMHG | RESPIRATION RATE: 18 BRPM | HEART RATE: 73 BPM | WEIGHT: 293 LBS | OXYGEN SATURATION: 98 % | BODY MASS INDEX: 43.4 KG/M2 | HEIGHT: 69 IN | SYSTOLIC BLOOD PRESSURE: 122 MMHG

## 2023-07-17 VITALS
HEART RATE: 83 BPM | SYSTOLIC BLOOD PRESSURE: 134 MMHG | WEIGHT: 293 LBS | BODY MASS INDEX: 43.4 KG/M2 | DIASTOLIC BLOOD PRESSURE: 80 MMHG | HEIGHT: 69 IN

## 2023-07-17 DIAGNOSIS — R22.40 LOCALIZED SWELLING OF LOWER LEG: ICD-10-CM

## 2023-07-17 DIAGNOSIS — R51.9 NONINTRACTABLE HEADACHE, UNSPECIFIED CHRONICITY PATTERN, UNSPECIFIED HEADACHE TYPE: ICD-10-CM

## 2023-07-17 DIAGNOSIS — R25.1 TREMORS OF NERVOUS SYSTEM: ICD-10-CM

## 2023-07-17 DIAGNOSIS — Z97.8 FOLEY CATHETER IN PLACE: ICD-10-CM

## 2023-07-17 DIAGNOSIS — R32 URINARY INCONTINENCE, UNSPECIFIED TYPE: ICD-10-CM

## 2023-07-17 DIAGNOSIS — R51.9 GENERALIZED HEADACHES: ICD-10-CM

## 2023-07-17 DIAGNOSIS — Z97.8 FOLEY CATHETER IN PLACE: Primary | ICD-10-CM

## 2023-07-17 DIAGNOSIS — J30.2 SEASONAL ALLERGIES: ICD-10-CM

## 2023-07-17 PROCEDURE — 3079F PR MOST RECENT DIASTOLIC BLOOD PRESSURE 80-89 MM HG: ICD-10-PCS | Mod: HCNC,CPTII,S$GLB, | Performed by: NURSE PRACTITIONER

## 2023-07-17 PROCEDURE — 1125F PR PAIN SEVERITY QUANTIFIED, PAIN PRESENT: ICD-10-PCS | Mod: HCNC,CPTII,S$GLB, | Performed by: NURSE PRACTITIONER

## 2023-07-17 PROCEDURE — 1126F PR PAIN SEVERITY QUANTIFIED, NO PAIN PRESENT: ICD-10-PCS | Mod: HCNC,CPTII,S$GLB, | Performed by: NURSE PRACTITIONER

## 2023-07-17 PROCEDURE — 99214 PR OFFICE/OUTPT VISIT, EST, LEVL IV, 30-39 MIN: ICD-10-PCS | Mod: HCNC,S$GLB,, | Performed by: NURSE PRACTITIONER

## 2023-07-17 PROCEDURE — 3079F DIAST BP 80-89 MM HG: CPT | Mod: HCNC,CPTII,S$GLB, | Performed by: NURSE PRACTITIONER

## 2023-07-17 PROCEDURE — 4010F ACE/ARB THERAPY RXD/TAKEN: CPT | Mod: HCNC,CPTII,S$GLB, | Performed by: NURSE PRACTITIONER

## 2023-07-17 PROCEDURE — 4010F PR ACE/ARB THEARPY RXD/TAKEN: ICD-10-PCS | Mod: HCNC,CPTII,S$GLB, | Performed by: NURSE PRACTITIONER

## 2023-07-17 PROCEDURE — 1159F PR MEDICATION LIST DOCUMENTED IN MEDICAL RECORD: ICD-10-PCS | Mod: HCNC,CPTII,S$GLB, | Performed by: NURSE PRACTITIONER

## 2023-07-17 PROCEDURE — 1157F ADVNC CARE PLAN IN RCRD: CPT | Mod: HCNC,CPTII,S$GLB, | Performed by: NURSE PRACTITIONER

## 2023-07-17 PROCEDURE — 99204 PR OFFICE/OUTPT VISIT, NEW, LEVL IV, 45-59 MIN: ICD-10-PCS | Mod: HCNC,S$GLB,, | Performed by: NURSE PRACTITIONER

## 2023-07-17 PROCEDURE — 1157F PR ADVANCE CARE PLAN OR EQUIV PRESENT IN MEDICAL RECORD: ICD-10-PCS | Mod: HCNC,CPTII,S$GLB, | Performed by: NURSE PRACTITIONER

## 2023-07-17 PROCEDURE — 99214 OFFICE O/P EST MOD 30 MIN: CPT | Mod: HCNC,S$GLB,, | Performed by: NURSE PRACTITIONER

## 2023-07-17 PROCEDURE — 99999 PR PBB SHADOW E&M-EST. PATIENT-LVL IV: CPT | Mod: PBBFAC,HCNC,, | Performed by: NURSE PRACTITIONER

## 2023-07-17 PROCEDURE — 99999 PR PBB SHADOW E&M-EST. PATIENT-LVL IV: ICD-10-PCS | Mod: PBBFAC,HCNC,, | Performed by: NURSE PRACTITIONER

## 2023-07-17 PROCEDURE — 51798 PR MEAS,POST-VOID RES,US,NON-IMAGING: ICD-10-PCS | Mod: HCNC,S$GLB,, | Performed by: NURSE PRACTITIONER

## 2023-07-17 PROCEDURE — 1126F AMNT PAIN NOTED NONE PRSNT: CPT | Mod: HCNC,CPTII,S$GLB, | Performed by: NURSE PRACTITIONER

## 2023-07-17 PROCEDURE — 1125F AMNT PAIN NOTED PAIN PRSNT: CPT | Mod: HCNC,CPTII,S$GLB, | Performed by: NURSE PRACTITIONER

## 2023-07-17 PROCEDURE — 99204 OFFICE O/P NEW MOD 45 MIN: CPT | Mod: HCNC,S$GLB,, | Performed by: NURSE PRACTITIONER

## 2023-07-17 PROCEDURE — 3074F PR MOST RECENT SYSTOLIC BLOOD PRESSURE < 130 MM HG: ICD-10-PCS | Mod: HCNC,CPTII,S$GLB, | Performed by: NURSE PRACTITIONER

## 2023-07-17 PROCEDURE — 51798 US URINE CAPACITY MEASURE: CPT | Mod: HCNC,S$GLB,, | Performed by: NURSE PRACTITIONER

## 2023-07-17 PROCEDURE — 99999 PR PBB SHADOW E&M-EST. PATIENT-LVL V: CPT | Mod: PBBFAC,HCNC,, | Performed by: NURSE PRACTITIONER

## 2023-07-17 PROCEDURE — 3074F SYST BP LT 130 MM HG: CPT | Mod: HCNC,CPTII,S$GLB, | Performed by: NURSE PRACTITIONER

## 2023-07-17 PROCEDURE — 3008F BODY MASS INDEX DOCD: CPT | Mod: HCNC,CPTII,S$GLB, | Performed by: NURSE PRACTITIONER

## 2023-07-17 PROCEDURE — 1159F MED LIST DOCD IN RCRD: CPT | Mod: HCNC,CPTII,S$GLB, | Performed by: NURSE PRACTITIONER

## 2023-07-17 PROCEDURE — 99999 PR PBB SHADOW E&M-EST. PATIENT-LVL V: ICD-10-PCS | Mod: PBBFAC,HCNC,, | Performed by: NURSE PRACTITIONER

## 2023-07-17 PROCEDURE — 3008F PR BODY MASS INDEX (BMI) DOCUMENTED: ICD-10-PCS | Mod: HCNC,CPTII,S$GLB, | Performed by: NURSE PRACTITIONER

## 2023-07-17 PROCEDURE — 3075F PR MOST RECENT SYSTOLIC BLOOD PRESS GE 130-139MM HG: ICD-10-PCS | Mod: HCNC,CPTII,S$GLB, | Performed by: NURSE PRACTITIONER

## 2023-07-17 PROCEDURE — 3075F SYST BP GE 130 - 139MM HG: CPT | Mod: HCNC,CPTII,S$GLB, | Performed by: NURSE PRACTITIONER

## 2023-07-17 RX ORDER — LEVOCETIRIZINE DIHYDROCHLORIDE 5 MG/1
5 TABLET, FILM COATED ORAL NIGHTLY
Qty: 90 TABLET | Refills: 0 | Status: SHIPPED | OUTPATIENT
Start: 2023-07-17 | End: 2023-08-02 | Stop reason: SDUPTHER

## 2023-07-17 RX ORDER — BUTALBITAL, ACETAMINOPHEN AND CAFFEINE 50; 325; 40 MG/1; MG/1; MG/1
TABLET ORAL
Qty: 30 TABLET | Refills: 1 | Status: SHIPPED | OUTPATIENT
Start: 2023-07-17 | End: 2023-08-02 | Stop reason: SDUPTHER

## 2023-07-17 NOTE — TELEPHONE ENCOUNTER
I called pt on home and spouse #. No answer. LVM on both #s for pt to return my call.       ----- Message from Antoinette Branch LPN sent at 2023  1:36 PM CDT -----  Regardinhr call

## 2023-07-17 NOTE — PROGRESS NOTES
Chief Complaint:   Incontinence, urge greater than stress    HPI:   Patient is a 73-year-old female that was recently hospitalized secondary to bradycardia.  Patient had multiple falls and staff was concerned that her incontinence was going to increase her fall risk.  Puckett catheter was placed why patient was in the hospital and she was discharged with catheter.  Patient does not have a history of urinary retention.  Does have a history of overactive bladder.  Has completed Cipro secondary to positive urine culture indicating E coli.  Denies gross hematuria.  Patient is asymptomatic related to possible urinary tract infection..  Allergies:  Pcn [penicillins]    Medications:  has a current medication list which includes the following prescription(s): alprazolam, amlodipine, apixaban, atorvastatin, bupropion, butalbital-acetaminophen-caffeine -40 mg, butalbital-acetaminophen-caffeine -40 mg, cholecalciferol (vitamin d3), ciprofloxacin hcl, clonidine, diclofenac sodium, gabapentin, letrozole, levocetirizine, lidocaine, methocarbamol, multivitamin, oxycodone-acetaminophen, ranolazine, sacubitril-valsartan, tamsulosin, torsemide, and zolpidem.    Review of Systems:  General: No fever, chills, fatigability, or weight loss.  Skin: No rashes, itching, or changes in color or texture of skin.  Chest: Denies CURIEL, cyanosis, wheezing, cough, and sputum production.  Abdomen: Appetite fine. No weight loss. Denies diarrhea, abdominal pain, hematemesis, or blood in stool.  Musculoskeletal: No joint stiffness or swelling. Denies back pain.  : As above.  All other review of systems negative.    PMH:   has a past medical history of Cataract, Chronic diastolic congestive heart failure (08/25/2020), Dizziness (03/12/2023), Encounter for blood transfusion, History of repair of aneurysm of abdominal aorta using endovascular stent graft, psychiatric care, Hypertension, Major depressive disorder, single episode, moderate with  anxious distress (2020), Malignant neoplasm of upper-outer quadrant of right breast in female, estrogen receptor positive (2019), Psychiatric problem, Sleep apnea, Sleep difficulties, Stroke (), and Therapy.    PSH:   has a past surgical history that includes Oophorectomy;  section; Appendectomy; [place] lymph node biopsy (Right, 2019); Breast lumpectomy; Breast biopsy; Axillary node dissection (Right, 2020); Biopsy of axillary node (Right, 2021); Tonsillectomy; and splenic artery aneurysm repair.    FamHx: family history includes Diabetes in her maternal grandfather, maternal grandmother, and mother; Hypertension in her mother; Sickle cell anemia in her daughter.    SocHx:  reports that she has never smoked. She has been exposed to tobacco smoke. She has never used smokeless tobacco. She reports that she does not drink alcohol and does not use drugs.      Physical Exam:  Vitals:    23 1503   BP: 134/80   Pulse: 83     General:  Morbidly obese female, A&Ox3, no apparent distress, no deformities  Neck: No masses, normal thyroid  Lungs: normal inspiration, no use of accessory muscles  Heart: normal pulse, no arrhythmias  Abdomen: Soft, NT, ND, no masses, no hernias, no hepatosplenomegaly  Lymphatic: Neck and groin nodes negative  Skin: The skin is warm and dry. No jaundice.    Impression/Plan:   Mixed incontinence  Puckett catheter was discontinued.  Patient is aware of the need to discontinue Flomax, has a history of falls.  Patient to follow-up with a return visit to discuss possible overactive bladder treatment.

## 2023-07-17 NOTE — PROGRESS NOTES
Susu Luther  07/28/2023  [id number]    Kristy Hardin MD  Patient Care Team:  Kristy Hardin MD as PCP - General (Family Medicine)  Wilda Lopez LMSW (Inactive) as  (Hematology and Oncology)  Elana Couch LPN as Care Coordinator  Jose Clarke MD as Consulting Physician (Hematology and Oncology)  Arlene Hobbs MD as Consulting Physician (Cardiology)  Raul Boyd OD as Consulting Physician (Optometry)  Cecily Aviles, PhD as Consulting Physician (Psychiatry)  Blank Cortez NP as Nurse Practitioner (Pulmonary Disease)  Adi Wellington III, MD as Consulting Physician (Radiation Oncology)  Rubi Sanchez MD as Consulting Physician (Dermatology)  Rogelio Vincent MD (Inactive) as Consulting Physician (Vascular Surgery)  Alexus Casas NP as Nurse Practitioner (Family Medicine)  Antoinette Branch LPN as Licensed Practical Nurse (Cardiology)  David Sierra RN as Outpatient           Visit Type:an urgent visit for an existing problem     Chief Complaint:  Chief Complaint   Patient presents with    Follow-up       History of Present Illness:    Patient is a 73-year-old female that was recently hospitalized secondary to bradycardia.  Patient had multiple falls and staff was concerned that her incontinence was going to increase her fall risk.  Puckett catheter was placed why patient was in the hospital and she was discharged with catheter.  Patient does not have a history of urinary retention.  Does have a history of overactive bladder.  Has completed Cipro secondary to positive urine culture indicating E coli.  Denies gross hematuria.  Patient is asymptomatic related to possible urinary tract infection..    History:  Past Medical History:   Diagnosis Date    Cataract     Bilateral    Chronic diastolic congestive heart failure 08/25/2020    Dizziness 03/12/2023    Encounter for blood transfusion     History of repair of aneurysm of abdominal aorta  using endovascular stent graft     DR Bonds    Hx of psychiatric care     Hypertension     Major depressive disorder, single episode, moderate with anxious distress 2020    Malignant neoplasm of upper-outer quadrant of right breast in female, estrogen receptor positive 2019    radiation    Psychiatric problem     Sleep apnea     Sleep difficulties     Stroke 2009    no residual defect    Therapy      Past Surgical History:   Procedure Laterality Date    APPENDECTOMY      AXILLARY NODE DISSECTION Right 2020    Procedure: LYMPHADENECTOMY, AXILLARY;  Surgeon: Artemio Starks MD;  Location: [facility] OR;  Service: General;  Laterality: Right;    BIOPSY OF AXILLARY NODE Right 2021    Procedure: BIOPSY, LYMPH NODE, AXILLARY;  Surgeon: Artemio Sutton MD;  Location: [facility] OR [address] [place] [address];  Service: General;  Laterality: Right;    BREAST BIOPSY      2019    BREAST LUMPECTOMY      2019     SECTION      x 1    OOPHORECTOMY      right     SENTINEL LYMPH NODE BIOPSY Right 2019    Procedure: BIOPSY, LYMPH NODE, SENTINEL;  Surgeon: Artemio Starks MD;  Location: [facility];  Service: General;  Laterality: Right;    splenic artery aneurysm repair      TONSILLECTOMY       Family History   Problem Relation Age of Onset    Diabetes Maternal Grandmother     Diabetes Maternal Grandfather     Diabetes Mother     Hypertension Mother     Sickle cell anemia Daughter     Breast cancer Neg Hx     Colon cancer Neg Hx     Ovarian cancer Neg Hx      Social History     Socioeconomic History    Marital status:      Spouse name: Campos Luther    Number of children: 2   Tobacco Use    Smoking status: Never     Passive exposure: Past    Smokeless tobacco: Never   Substance and Sexual Activity    Alcohol use: No    Drug use: No    Sexual activity: Yes     Partners: Male     Birth control/protection: Post-menopausal     Social Determinants of Health     Financial Resource Strain: Low Risk     Difficulty of  Paying Living Expenses: Not hard at all   Food Insecurity: No Food Insecurity    Worried About Running Out of Food in the Last Year: Never true    Ran Out of Food in the Last Year: Never true   Transportation Needs: No Transportation Needs    Lack of Transportation (Medical): No    Lack of Transportation (Non-Medical): No   Physical Activity: Inactive    Days of Exercise per Week: 0 days    Minutes of Exercise per Session: 0 min   Stress: Stress Concern Present    Feeling of Stress : To some extent   Social Connections: Socially Integrated    Frequency of Communication with Friends and Family: More than three times a week    Frequency of Social Gatherings with Friends and Family: Never    Attends Christian Services: More than 4 times per year    Active Member of Clubs or Organizations: Yes    Attends Club or Organization Meetings: 1 to 4 times per year    Marital Status:    Housing Stability: Low Risk     Unable to Pay for Housing in the Last Year: No    Number of Places Lived in the Last Year: 1    Unstable Housing in the Last Year: No     Patient Active Problem List   Diagnosis    Shortness of breath on exertion    NILS (obstructive sleep apnea)    Malignant neoplasm of upper-outer quadrant of right breast in female, estrogen receptor positive    Hypertension    Class 3 severe obesity in adult    Headache    Circadian rhythm sleep disorder    Nocturnal hypoxemia    Major depressive disorder, recurrent episode, moderate with anxious distress    Speech disturbance    Blurry vision    Psychological factors affecting medical condition    SOB (shortness of breath)    Lymphadenopathy    Porcelain gallbladder    Axillary mass    Splenic artery aneurysm    Chronic kidney disease, stage 3a    Other chest pain    Acute pulmonary embolism    Primary osteoarthritis of right hip    History of pulmonary embolism    Multiple thyroid nodules    History of prediabetes    Right sided weakness    Neuropathic pain    Neoplasm  related pain    Chronic diastolic congestive heart failure    Bradycardia    Neurological complaint    Orthostatic hypotension    Urinary retention    Frequent falls    Right shoulder pain    UTI (urinary tract infection)    Weakness     Review of patient's allergies indicates:   Allergen Reactions    Pcn [penicillins] Hives       The following were reviewed at this visit: active problem list, medication list, allergies, family history, social history, and health maintenance.    Medications:  Current Outpatient Medications on File Prior to Visit   Medication Sig Dispense Refill    ALPRAZolam (XANAX) 0.5 MG tablet Take 1 tablet (0.5 mg total) by mouth 2 (two) times daily as needed for Anxiety. 180 tablet 0    amLODIPine (NORVASC) 10 MG tablet Take 1 tablet (10 mg total) by mouth once daily. 90 tablet 3    apixaban (ELIQUIS) 5 mg Tab Take 1 tablet (5 mg total) by mouth 2 (two) times daily. 180 tablet 1    atorvastatin (LIPITOR) 20 MG tablet Take 1 tablet (20 mg total) by mouth once daily. 90 tablet 3    buPROPion (WELLBUTRIN XL) 150 MG TB24 tablet Take 1 tablet (150 mg total) by mouth once daily. 30 tablet 11    butalbital-acetaminophen-caffeine -40 mg (FIORICET, ESGIC) -40 mg per tablet TAKE 1 TABLET DAILY AS NEEDED FOR PAIN OR HEADACHES. 30 tablet 1    cholecalciferol, vitamin D3, 1,250 mcg (50,000 unit) capsule Take 1 capsule (50,000 Units total) by mouth once a week. 12 capsule 0    cloNIDine (CATAPRES) 0.1 MG tablet Take 2 tablets (0.2 mg total) by mouth 2 (two) times daily as needed (BP>170/95). 80 tablet 1    diclofenac sodium (VOLTAREN) 1 % Gel Apply 2 grams topically once daily. 400 g 1    gabapentin (NEURONTIN) 300 MG capsule Take 1 capsule (300 mg total) by mouth 3 (three) times daily. 90 capsule 1    letrozole (FEMARA) 2.5 mg Tab Take 1 tablet (2.5 mg total) by mouth once daily. 90 tablet 1    LIDOcaine (LIDODERM) 5 % Place 2 patches onto the skin once daily. Remove & Discard patch within 12  hours or as directed by MD 90 patch 1    multivitamin (THERAGRAN) per tablet Take 1 tablet by mouth once daily.      oxyCODONE-acetaminophen (PERCOCET) 7.5-325 mg per tablet Take 1 tablet by mouth every 6 (six) hours as needed for Pain. 60 tablet 0    ranolazine (RANEXA) 1,000 mg Tb12 Take 1 tablet (1,000 mg total) by mouth 2 (two) times daily. 180 tablet 3    sacubitriL-valsartan (ENTRESTO) 24-26 mg per tablet Take 1 tablet by mouth 2 (two) times daily. 60 tablet 1    torsemide (DEMADEX) 20 MG Tab Take 1 tablet (20 mg total) by mouth once daily. 30 tablet 0    zolpidem (AMBIEN) 5 MG Tab Take 1 tablet (5 mg total) by mouth nightly as needed (sleep). 90 tablet 1     No current facility-administered medications on file prior to visit.       Medications have been reviewed and reconciled with patient at this visit.  Barriers to medications reviewed with patient.    Adverse reactions to current medications reviewed with patient..    Over the counter medications reviewed and reconciled with patient.    Exam:  Wt Readings from Last 3 Encounters:   07/21/23 (!) 138.3 kg (304 lb 14.3 oz)   07/17/23 (!) 136.5 kg (301 lb)   07/17/23 (!) 136.6 kg (301 lb 2.4 oz)     Temp Readings from Last 3 Encounters:   07/14/23 98.4 °F (36.9 °C) (Oral)   07/03/23 98.6 °F (37 °C) (Tympanic)   06/05/23 97.5 °F (36.4 °C) (Tympanic)     BP Readings from Last 3 Encounters:   07/21/23 120/76   07/17/23 134/80   07/17/23 122/84     Pulse Readings from Last 3 Encounters:   07/21/23 88   07/17/23 83   07/17/23 73     Body mass index is 44.47 kg/m².      Review of Systems   Constitutional:  Negative for fever.   Respiratory:  Negative for cough, shortness of breath and wheezing.    Cardiovascular:  Positive for leg swelling. Negative for chest pain and palpitations.   Gastrointestinal:  Negative for nausea.   Genitourinary:  Positive for frequency and urgency.        Catheter in place    Neurological:  Negative for speech change, weakness and  headaches.   All other systems reviewed and are negative.  Physical Exam  Vitals and nursing note reviewed.   Constitutional:       Appearance: Normal appearance. She is obese.   HENT:      Head: Normocephalic and atraumatic.      Right Ear: Tympanic membrane, ear canal and external ear normal.      Left Ear: Tympanic membrane, ear canal and external ear normal.      Nose: Nose normal.      Mouth/Throat:      Mouth: Mucous membranes are moist.      Pharynx: Oropharynx is clear.   Eyes:      Extraocular Movements: Extraocular movements intact.      Conjunctiva/sclera: Conjunctivae normal.      Pupils: Pupils are equal, round, and reactive to light.   Cardiovascular:      Rate and Rhythm: Normal rate and regular rhythm.      Pulses: Normal pulses.      Heart sounds: Normal heart sounds.   Pulmonary:      Effort: Pulmonary effort is normal.      Breath sounds: Normal breath sounds.   Abdominal:      General: Bowel sounds are normal.      Palpations: Abdomen is soft.   Genitourinary:     Comments: Indwelling catheter is in place   Musculoskeletal:         General: Normal range of motion.      Cervical back: Normal range of motion and neck supple.      Right lower le+ Pitting Edema present.      Left lower le+ Pitting Edema present.   Skin:     General: Skin is warm and dry.      Capillary Refill: Capillary refill takes less than 2 seconds.   Neurological:      General: No focal deficit present.      Mental Status: She is alert and oriented to person, place, and time.   Psychiatric:         Mood and Affect: Mood normal.         Behavior: Behavior normal.         Thought Content: Thought content normal.         Judgment: Judgment normal.       Laboratory Reviewed ({Yes)  Lab Results   Component Value Date    WBC 7.65 2023    HGB 12.1 2023    HCT 38.5 2023     2023    CHOL 234 (H) 2023    TRIG 191 (H) 2023    HDL 44 2023    ALT 10 2023    AST 11 2023      07/14/2023    K 3.8 07/14/2023     07/14/2023    CREATININE 1.4 07/14/2023    BUN 17 07/14/2023    CO2 26 07/14/2023    TSH 1.691 03/10/2023    INR 1.1 03/11/2023    HGBA1C 5.9 (H) 05/18/2019       Susu was seen today for follow-up.    Diagnoses and all orders for this visit:    Atkins catheter in place  -     Ambulatory referral/consult to Urology; Future    Urinary incontinence, unspecified type  -     Ambulatory referral/consult to Urology; Future    Nonintractable headache, unspecified chronicity pattern, unspecified headache type  -     butalbital-acetaminophen-caffeine -40 mg (FIORICET, ESGIC) -40 mg per tablet; TAKE 1 TABLET DAILY AS NEEDED FOR PAIN OR HEADACHES.    Seasonal allergies  -     levocetirizine (XYZAL) 5 MG tablet; Take 1 tablet (5 mg total) by mouth every evening.    Generalized headaches    Tremors of nervous system  -     Ambulatory referral/consult to Neurology; Future    Localized swelling of lower leg  -     COMPRESSION SLEEVE/SOCK FOR HOME USE        -follow up with urology today after appointment to have atkins removed         Care Plan/Goals: Reviewed    Goals         Blood Pressure < 150/90 (pt-stated)       Exercise at least 150 minutes per week.       Take at least one BP reading per week at various times of the day       Track and Manage Fluids and Swelling       Follow Up Date 8/1/23      - call office if I gain more than 2 pounds in one day or 5 pounds in one week  - keep legs up while sitting  - track weight in diary  - use salt in moderation  - watch for swelling in feet, ankles and legs every day  - weigh myself daily      Why is this important?    It is important to check your weight daily and watch how much salt and liquids you have.   It will help you to manage your heart failure.      Notes:           Susu was seen today for follow-up.    Diagnoses and all orders for this visit:    Atkins catheter in place  -     Ambulatory referral/consult to  Urology; Future    Urinary incontinence, unspecified type  -     Ambulatory referral/consult to Urology; Future    Nonintractable headache, unspecified chronicity pattern, unspecified headache type  -     butalbital-acetaminophen-caffeine -40 mg (FIORICET, ESGIC) -40 mg per tablet; TAKE 1 TABLET DAILY AS NEEDED FOR PAIN OR HEADACHES.    Seasonal allergies  -     levocetirizine (XYZAL) 5 MG tablet; Take 1 tablet (5 mg total) by mouth every evening.    Generalized headaches    Tremors of nervous system  -     Ambulatory referral/consult to Neurology; Future    Localized swelling of lower leg  -     COMPRESSION SLEEVE/SOCK FOR HOME USE         Follow up: Follow up for follow up with urology .    After visit summary was printed and given to patient upon discharge today.  Patient goals and care plan are included in After Visit Summary.

## 2023-07-18 ENCOUNTER — TELEPHONE (OUTPATIENT)
Dept: TRANSPLANT | Facility: CLINIC | Age: 73
End: 2023-07-18
Payer: MEDICARE

## 2023-07-18 ENCOUNTER — OUTPATIENT CASE MANAGEMENT (OUTPATIENT)
Dept: ADMINISTRATIVE | Facility: OTHER | Age: 73
End: 2023-07-18
Payer: MEDICARE

## 2023-07-18 NOTE — PROGRESS NOTES
Outpatient Care Management  Initial Patient Assessment    Patient: Susu Luther  MRN: [id number]  Date of Service: 07/18/2023  Completed by: David Sierra RN  Referral Date: 07/12/2023  Program: High Risk  Status: Ongoing  Effective Dates: 7/18/2023 - present  Responsible Staff: David Sierra RN        Reason for Visit   Patient presents with    OPCM Enrollment Call       Brief Summary:  Susu Luther was referred after a hospital visit for SOB, fatigue, and bradycardia.. Patient qualifies for program based on her risk score of 60%.   Active problem list, medical, surgical and social history reviewed. Areas of need identified by patient include CHF management and education.   Next steps: Mail:  Welcome letter   CHF education   Follow up in about two weeks    Disability Status  Is the patient alert and oriented (person, place, time, and situation)?: Alert and oriented x 4  Hearing Difficulty or Deaf: no  Visual Difficulty or Blind: yes  Visual and Hearing Needs Conclusion: wears eye glasses daily for impaired vision  Difficulty Concentrating, Remembering or Making Decisions: no  Communication Difficulty: no  Eating/Swallowing Difficulty: no  Walking or Climbing Stairs Difficulty: yes  Walking or Climbing Stairs: ambulation difficulty, requires equipment  Mobility Management: walker or cane  Dressing/Bathing Difficulty: yes  Dressing/Bathing: bathing difficulty, requires equipment  Dressing/Bathing Management: shower chair, bench  Toileting : Assistance Required  Continence : Incontience - Bladder; Indwelling  Difficulty Managing Errands Independently: yes  Errands Management: help from family/friends  Equipment Currently Used at Home: bath bench; cane, straight; grab bar; shower chair; walker, rolling  ADL Conclusion Statement: needs assistance in and out of shower, needs assistance running errands, is able to feed self, requires assistance with cane/walker for ambulation  Change in Functional Status  Since Onset of Current Illness/Injury: no        Spiritual Beliefs  Spiritual, Cultural Beliefs, Alevism Practices, Values that Affect Care: no      Social History     Socioeconomic History    Marital status:      Spouse name: Campos Luther    Number of children: 2   Tobacco Use    Smoking status: Never     Passive exposure: Past    Smokeless tobacco: Never   Substance and Sexual Activity    Alcohol use: No    Drug use: No    Sexual activity: Yes     Partners: Male     Birth control/protection: Post-menopausal     Social Determinants of Health     Financial Resource Strain: Low Risk     Difficulty of Paying Living Expenses: Not hard at all   Food Insecurity: No Food Insecurity    Worried About Running Out of Food in the Last Year: Never true    Ran Out of Food in the Last Year: Never true   Transportation Needs: No Transportation Needs    Lack of Transportation (Medical): No    Lack of Transportation (Non-Medical): No   Physical Activity: Inactive    Days of Exercise per Week: 0 days    Minutes of Exercise per Session: 0 min   Stress: Stress Concern Present    Feeling of Stress : To some extent   Social Connections: Socially Integrated    Frequency of Communication with Friends and Family: More than three times a week    Frequency of Social Gatherings with Friends and Family: Never    Attends Alevism Services: More than 4 times per year    Active Member of Clubs or Organizations: Yes    Attends Club or Organization Meetings: 1 to 4 times per year    Marital Status:    Housing Stability: Low Risk     Unable to Pay for Housing in the Last Year: No    Number of Places Lived in the Last Year: 1    Unstable Housing in the Last Year: No       Roles and Relationships  Primary Source of Support/Comfort: child(cindy); spouse  Name of Support/Comfort Primary Source: campos      Advance Directives (For Healthcare)  Advance Directive  (If Adv Dir status is received, view document under Adv Dir in header or  Chart Review Media tab): Advance Directive currently in Epic.        Patient Reported Insurance  Verified current insurance plan:: [employer]  [employer] benefits discussed:: OTC Prescription Discounts; Transportation; Mail Order Pharmacy        Depression Patient Health Questionnaire 7/18/2023 6/8/2022 2/25/2021 1/8/2021 12/14/2020 11/19/2020 11/19/2020   Over the last two weeks how often have you been bothered by little interest or pleasure in doing things Several days Not at all Not at all Not at all Several days Not at all Not at all   Over the last two weeks how often have you been bothered by feeling down, depressed or hopeless Several days Not at all Not at all Not at all Several days Not at all Not at all   PHQ-2 Total Score 2 0 0 0 2 0 0   Over the last two weeks how often have you been bothered by trouble falling or staying asleep, or sleeping too much - - - - - - -   Over the last two weeks how often have you been bothered by feeling tired or having little energy - - - - - - -   Over the last two weeks how often have you been bothered by a poor appetite or overeating - - - - - - -   Over the last two weeks how often have you been bothered by feeling bad about yourself - or that you are a failure or have let yourself or your family down - - - - - - -   Over the last two weeks how often have you been bothered by trouble concentrating on things, such as reading the newspaper or watching television - - - - - - -   Over the last two weeks how often have you been bothered by moving or speaking so slowly that other people could have noticed. Or the opposite - being so fidgety or restless that you have been moving around a lot more than usual. - - - - - - -   Over the last two weeks how often have you been bothered by thoughts that you would be better off dead, or of hurting yourself - - - - - - -   If you checked off any problems, how difficult have these problems made it for you to do your work, take  care of things at home or get along with other people? - - - - - - -   Total Score - - - - - - -       Learning Assessment       07/18/2023 1457 [facility] (7/18/2023 - Present)   Created by David Sierra, RN -  (Nurse) Status: Complete                 PRIMARY LEARNER     Primary Learner Name:  Susu Luther  - 07/18/2023 1457    Relationship:  Patient  - 07/18/2023 1457    Does the primary learner have any barriers to learning?:  No Barriers  - 07/18/2023 1457    What is the preferred language of the primary learner?:  English JM - 07/18/2023 1457    Is an  required?:  No JM - 07/18/2023 1457    How does the primary learner prefer to learn new concepts?:  Listening JM - 07/18/2023 1457    How often do you need to have someone help you read instructions, pamphlets, or written material from your doctor or pharmacy?:  Never  - 07/18/2023 1457        CO-LEARNER #1     No question answered        CO-LEARNER #2     No question answered        SPECIAL TOPICS     No question answered        ANSWERED BY:     No question answered        Edit History       David Sierra, RN -  (Nurse)   07/18/2023 1457

## 2023-07-18 NOTE — TELEPHONE ENCOUNTER
"I briefly spoke with pt as she states she is in the process of going to [facility].   She denies any worsening HF symptoms at this time.     She says she still has her booklet from the hospital "home care guide for Heart failure patients"  She confirmed her upcoming appt with Maria Isabel GHOTRA on 23.   She will call sooner if anything changes or worsens before then. .   ----- Message from Antoinette Branch LPN sent at 2023  1:36 PM CDT -----  Regardinhr call      "

## 2023-07-18 NOTE — LETTER
Susu Luther  [id number] [place] [address]    Dear Susu Luther,     Welcome to [facility] Outpatient Care Management Program. We are here to assist patients with multiple long-term (chronic) conditions who often need more personalized healthcare.    It was a pleasure talking with you today. My name is David Sierra RN. I look forward to working with you as your Care Manager. I will be contacting you by telephone routinely to help coordinate care and resolve issues.    My goal is to help you function at the healthiest and highest level possible. You can contact me directly at [phone].    As an [facility] patient with [employer], some of the services we provide, at no cost to you, include:     Development of an individualized care plan with a Registered Nurse   Connection with a   Assistance from a Community Health Worker  Connection with available resources and services    Coordinate communication among your care team members   Provide coaching and education   Help you understand your doctors treatment plan  Help you obtain information about your insurance coverage.     All services provided by [facility]s Outpatient Care Managers and other care team members are coordinated with and communicated to your primary care team.      As part of your enrollment, you will be receiving education materials and more information about these services in your [facility] account, by phone, or through the mail. If you do not wish to participate or receive information, you can Opt Out by contacting our office at [phone].      Sincerely,        David Sierra RN  [facility]   Outpatient Care Management

## 2023-07-21 ENCOUNTER — OFFICE VISIT (OUTPATIENT)
Dept: CARDIOLOGY | Facility: CLINIC | Age: 73
End: 2023-07-21
Payer: MEDICARE

## 2023-07-21 VITALS
SYSTOLIC BLOOD PRESSURE: 120 MMHG | DIASTOLIC BLOOD PRESSURE: 76 MMHG | HEIGHT: 69 IN | BODY MASS INDEX: 43.4 KG/M2 | HEART RATE: 88 BPM | WEIGHT: 293 LBS

## 2023-07-21 DIAGNOSIS — I50.32 CHRONIC DIASTOLIC CONGESTIVE HEART FAILURE: Primary | ICD-10-CM

## 2023-07-21 PROCEDURE — 1126F AMNT PAIN NOTED NONE PRSNT: CPT | Mod: HCNC,CPTII,S$GLB, | Performed by: PHYSICIAN ASSISTANT

## 2023-07-21 PROCEDURE — 3074F SYST BP LT 130 MM HG: CPT | Mod: HCNC,CPTII,S$GLB, | Performed by: PHYSICIAN ASSISTANT

## 2023-07-21 PROCEDURE — 4010F ACE/ARB THERAPY RXD/TAKEN: CPT | Mod: HCNC,CPTII,S$GLB, | Performed by: PHYSICIAN ASSISTANT

## 2023-07-21 PROCEDURE — 1157F ADVNC CARE PLAN IN RCRD: CPT | Mod: HCNC,CPTII,S$GLB, | Performed by: PHYSICIAN ASSISTANT

## 2023-07-21 PROCEDURE — 1126F PR PAIN SEVERITY QUANTIFIED, NO PAIN PRESENT: ICD-10-PCS | Mod: HCNC,CPTII,S$GLB, | Performed by: PHYSICIAN ASSISTANT

## 2023-07-21 PROCEDURE — 1160F PR REVIEW ALL MEDS BY PRESCRIBER/CLIN PHARMACIST DOCUMENTED: ICD-10-PCS | Mod: HCNC,CPTII,S$GLB, | Performed by: PHYSICIAN ASSISTANT

## 2023-07-21 PROCEDURE — 3078F DIAST BP <80 MM HG: CPT | Mod: HCNC,CPTII,S$GLB, | Performed by: PHYSICIAN ASSISTANT

## 2023-07-21 PROCEDURE — 99496 TRANSITIONAL CARE MANAGE SERVICE 7 DAY DISCHARGE: ICD-10-PCS | Mod: HCNC,S$GLB,, | Performed by: PHYSICIAN ASSISTANT

## 2023-07-21 PROCEDURE — 3008F BODY MASS INDEX DOCD: CPT | Mod: HCNC,CPTII,S$GLB, | Performed by: PHYSICIAN ASSISTANT

## 2023-07-21 PROCEDURE — 3008F PR BODY MASS INDEX (BMI) DOCUMENTED: ICD-10-PCS | Mod: HCNC,CPTII,S$GLB, | Performed by: PHYSICIAN ASSISTANT

## 2023-07-21 PROCEDURE — 3078F PR MOST RECENT DIASTOLIC BLOOD PRESSURE < 80 MM HG: ICD-10-PCS | Mod: HCNC,CPTII,S$GLB, | Performed by: PHYSICIAN ASSISTANT

## 2023-07-21 PROCEDURE — 1160F RVW MEDS BY RX/DR IN RCRD: CPT | Mod: HCNC,CPTII,S$GLB, | Performed by: PHYSICIAN ASSISTANT

## 2023-07-21 PROCEDURE — 99496 TRANSJ CARE MGMT HIGH F2F 7D: CPT | Mod: HCNC,S$GLB,, | Performed by: PHYSICIAN ASSISTANT

## 2023-07-21 PROCEDURE — 99999 PR PBB SHADOW E&M-EST. PATIENT-LVL IV: CPT | Mod: PBBFAC,HCNC,, | Performed by: PHYSICIAN ASSISTANT

## 2023-07-21 PROCEDURE — 1157F PR ADVANCE CARE PLAN OR EQUIV PRESENT IN MEDICAL RECORD: ICD-10-PCS | Mod: HCNC,CPTII,S$GLB, | Performed by: PHYSICIAN ASSISTANT

## 2023-07-21 PROCEDURE — 1159F MED LIST DOCD IN RCRD: CPT | Mod: HCNC,CPTII,S$GLB, | Performed by: PHYSICIAN ASSISTANT

## 2023-07-21 PROCEDURE — 4010F PR ACE/ARB THEARPY RXD/TAKEN: ICD-10-PCS | Mod: HCNC,CPTII,S$GLB, | Performed by: PHYSICIAN ASSISTANT

## 2023-07-21 PROCEDURE — 1159F PR MEDICATION LIST DOCUMENTED IN MEDICAL RECORD: ICD-10-PCS | Mod: HCNC,CPTII,S$GLB, | Performed by: PHYSICIAN ASSISTANT

## 2023-07-21 PROCEDURE — 99999 PR PBB SHADOW E&M-EST. PATIENT-LVL IV: ICD-10-PCS | Mod: PBBFAC,HCNC,, | Performed by: PHYSICIAN ASSISTANT

## 2023-07-21 PROCEDURE — 3074F PR MOST RECENT SYSTOLIC BLOOD PRESSURE < 130 MM HG: ICD-10-PCS | Mod: HCNC,CPTII,S$GLB, | Performed by: PHYSICIAN ASSISTANT

## 2023-07-21 RX ORDER — TAMSULOSIN HYDROCHLORIDE 0.4 MG/1
0.4 CAPSULE ORAL DAILY
COMMUNITY
End: 2023-08-02 | Stop reason: SDUPTHER

## 2023-07-21 RX ORDER — HYDRALAZINE HYDROCHLORIDE 25 MG/1
25 TABLET, FILM COATED ORAL 3 TIMES DAILY
COMMUNITY
End: 2023-08-02 | Stop reason: SDUPTHER

## 2023-07-21 NOTE — PROGRESS NOTES
[facility] Provider Note (Initial Clinic) Consult Note    Date of original referral: 7/14/2023  Age: 73 y.o.  Gender: female  Ethnicity: Not  or /a   Number of admissions for CHF within the preceding year:  2  Duration of CHF: diastolic   Type of Congestive Heart Failure: Diastolic   Etiology: Non-ischemic    Social history: none  Enrolled in Infusion suite: no    Diagnostic Labs:   EKG - 07/13/2023  CXR - 07/11/2023  ECHO - 03/10/2023  Stress test -   Stress echo - 01/10/2023  Pharmacologic stress -   Cardiac catheterization -    Cardiac MRI -     Lab Results   Component Value Date     07/14/2023     07/13/2023    K 3.8 07/14/2023    K 3.5 07/13/2023     07/14/2023     07/13/2023    CO2 26 07/14/2023    CO2 25 07/13/2023     07/14/2023     (H) 07/13/2023    BUN 17 07/14/2023    BUN 20 07/13/2023    CREATININE 1.4 07/14/2023    CREATININE 1.3 07/13/2023    CALCIUM 9.7 07/14/2023    CALCIUM 9.4 07/13/2023    PROT 7.2 07/14/2023    PROT 6.8 07/13/2023    ALBUMIN 3.3 (L) 07/14/2023    ALBUMIN 3.2 (L) 07/13/2023    BILITOT 0.3 07/14/2023    BILITOT 0.3 07/13/2023    ALKPHOS 114 07/14/2023    ALKPHOS 110 07/13/2023    AST 11 07/14/2023    AST 12 07/13/2023    ALT 10 07/14/2023    ALT 10 07/13/2023    ANIONGAP 12 07/14/2023    ANIONGAP 10 07/13/2023    ESTGFRAFRICA 37 (A) 07/06/2022    ESTGFRAFRICA 44 (A) 06/14/2022    EGFRNONAA 32 (A) 07/06/2022    EGFRNONAA 38 (A) 06/14/2022       Lab Results   Component Value Date    WBC 7.65 07/14/2023    WBC 6.86 07/13/2023    RBC 4.83 07/14/2023    RBC 4.38 07/13/2023    HGB 12.1 07/14/2023    HGB 11.1 (L) 07/13/2023    HCT 38.5 07/14/2023    HCT 35.3 (L) 07/13/2023    MCV 80 (L) 07/14/2023    MCV 81 (L) 07/13/2023    MCH 25.1 (L) 07/14/2023    MCH 25.3 (L) 07/13/2023    MCHC 31.4 (L) 07/14/2023    MCHC 31.4 (L) 07/13/2023    RDW 14.9 (H) 07/14/2023    RDW 14.9 (H) 07/13/2023     07/14/2023     07/13/2023    MPV 9.9  07/14/2023    MPV 9.6 07/13/2023    IMMGR 0.3 07/14/2023    IMMGR 0.3 07/13/2023    IGABS 0.02 07/14/2023    IGABS 0.02 07/13/2023    LYMPH 2.8 07/14/2023    LYMPH 36.2 07/14/2023    MONO 0.8 07/14/2023    MONO 9.8 07/14/2023    EOS 0.2 07/14/2023    EOS 0.2 07/13/2023    BASO 0.05 07/14/2023    BASO 0.05 07/13/2023    NRBC 0 07/14/2023    NRBC 0 07/13/2023    GRAN 3.9 07/14/2023    GRAN 50.8 07/14/2023    EOSINOPHIL 2.2 07/14/2023    EOSINOPHIL 2.2 07/13/2023    BASOPHIL 0.7 07/14/2023    BASOPHIL 0.7 07/13/2023    PLTEST Appears normal 03/10/2023    ANISO Slight 03/10/2023       Lab Results   Component Value Date     (H) 07/11/2023    BNP 76 03/22/2023    MG 2.0 07/14/2023    MG 2.0 07/13/2023    PHOS 4.2 03/10/2023    PHOS 3.6 06/14/2022    TROPONINI 0.025 07/12/2023    TROPONINI 0.024 07/11/2023    HGBA1C 5.9 (H) 05/18/2019    TSH 1.691 03/10/2023    TSH 1.460 10/18/2021    FREET4 0.92 10/18/2021    FREET4 1.23 11/11/2019       Lab Results   Component Value Date    IRON 69 12/16/2019    TIBC 302 12/16/2019    FERRITIN 56 12/16/2019    CHOL 234 (H) 02/28/2023    TRIG 191 (H) 02/28/2023    HDL 44 02/28/2023    LDLCALC 151.8 02/28/2023    CHOLHDL 18.8 (L) 02/28/2023    TOTALCHOLEST 5.3 (H) 02/28/2023    NONHDLCHOL 190 02/28/2023    COLORU Yellow 07/11/2023    APPEARANCEUA Clear 07/11/2023    PHUR 7.0 07/11/2023    SPECGRAV 1.010 07/11/2023    PROTEINUA Negative 07/11/2023    GLUCUA Negative 07/11/2023    KETONESU Negative 07/11/2023    BILIRUBINUA Negative 07/11/2023    OCCULTUA Negative 07/11/2023    NITRITE Positive (A) 07/11/2023    LEUKOCYTESUR Trace (A) 07/11/2023       List all implanted cardiac devices:     Cardiomems: no    Current Outpatient Medications on File Prior to Visit   Medication Sig Dispense Refill    ALPRAZolam (XANAX) 0.5 MG tablet Take 1 tablet (0.5 mg total) by mouth 2 (two) times daily as needed for Anxiety. 180 tablet 0    amLODIPine (NORVASC) 10 MG tablet Take 1 tablet (10 mg total)  by mouth once daily. 90 tablet 3    apixaban (ELIQUIS) 5 mg Tab Take 1 tablet (5 mg total) by mouth 2 (two) times daily. 180 tablet 1    atorvastatin (LIPITOR) 20 MG tablet Take 1 tablet (20 mg total) by mouth once daily. 90 tablet 3    buPROPion (WELLBUTRIN XL) 150 MG TB24 tablet Take 1 tablet (150 mg total) by mouth once daily. 30 tablet 11    butalbital-acetaminophen-caffeine -40 mg (FIORICET, ESGIC) -40 mg per tablet TAKE 1 TABLET DAILY AS NEEDED FOR PAIN OR HEADACHES. 30 tablet 1    butalbital-acetaminophen-caffeine -40 mg (FIORICET, ESGIC) -40 mg per tablet TAKE 1 TABLET DAILY AS NEEDED FOR PAIN OR HEADACHES. 30 tablet 1    cholecalciferol, vitamin D3, 1,250 mcg (50,000 unit) capsule Take 1 capsule (50,000 Units total) by mouth once a week. 12 capsule 0    cloNIDine (CATAPRES) 0.1 MG tablet Take 2 tablets (0.2 mg total) by mouth 2 (two) times daily as needed (BP>170/95). 80 tablet 1    diclofenac sodium (VOLTAREN) 1 % Gel Apply 2 grams topically once daily. 400 g 1    gabapentin (NEURONTIN) 300 MG capsule Take 1 capsule (300 mg total) by mouth 3 (three) times daily. 90 capsule 1    letrozole (FEMARA) 2.5 mg Tab Take 1 tablet (2.5 mg total) by mouth once daily. 90 tablet 1    levocetirizine (XYZAL) 5 MG tablet Take 1 tablet (5 mg total) by mouth every evening. 90 tablet 0    LIDOcaine (LIDODERM) 5 % Place 2 patches onto the skin once daily. Remove & Discard patch within 12 hours or as directed by MD Conner patch 1    methocarbamoL (ROBAXIN) 500 MG Tab Take 1 tablet (500 mg total) by mouth 3 (three) times daily as needed (muscle spasms). 30 tablet 0    multivitamin (THERAGRAN) per tablet Take 1 tablet by mouth once daily.      oxyCODONE-acetaminophen (PERCOCET) 7.5-325 mg per tablet Take 1 tablet by mouth every 6 (six) hours as needed for Pain. 60 tablet 0    ranolazine (RANEXA) 1,000 mg Tb12 Take 1 tablet (1,000 mg total) by mouth 2 (two) times daily. 180 tablet 3    sacubitriL-valsartan  (ENTRESTO) 24-26 mg per tablet Take 1 tablet by mouth 2 (two) times daily. 60 tablet 1    torsemide (DEMADEX) 20 MG Tab Take 1 tablet (20 mg total) by mouth once daily. 30 tablet 0    zolpidem (AMBIEN) 5 MG Tab Take 1 tablet (5 mg total) by mouth nightly as needed (sleep). 90 tablet 1     No current facility-administered medications on file prior to visit.         HPI:  Patient can walk with PT at rehab    Patient sleeps on 2 pillows   Patient wakes up SOB, has to get out of bed, associated cough, sputum none   Palpitations - none   Dizzy, light-headed, pre-syncope or syncope none   Since discharge frequency of performing weights, home weight and weight change stable    Other information felt pertinent to HPI    Transitional Care Note    Family and/or Caretaker present at visit?  Yes.  Diagnostic tests reviewed/disposition: I have reviewed all completed as well as pending diagnostic tests at the time of discharge.  Disease/illness education: yes  Home health/community services discussion/referrals: Patient does not have home health established from hospital visit.  They do not need home health.  If needed, we will set up home health for the patient.   Establishment or re-establishment of referral orders for community resources: No other necessary community resources.   Discussion with other health care providers: No discussion with other health care providers necessary.          Susu Luther is a 73 y.o. female with Chronic diastolic congestive heart failure, History of repair of aneurysm of abdominal aorta using endovascular stent graft, psychiatric care, Hypertension, Major depressive disorder, Malignant neoplasm of upper-outer quadrant of right breast in female, estrogen receptor positive, Sleep apnea, Stroke  who presented to ED on 7/11/2023 with weakness.  Patient reports that she was feeling extremely short of breath this morning, subsequently a friend called EMS.  Additionally she has been having a lot  of falls at home including last Saturday.  Reports that she fell onto her right shoulder and since then has been having a lot of pain on her arm and shoulder.  Additionally has been having worsening swelling of her legs making it difficult and painful for her to walk.  States that she had recently been doubling up on her home Lasix dose per the advice of Cardiology.  Has not had any chest pain.  Admits to ongoing depression and anxiety which has not been well-controlled.  Per EMS report, patient was found to be bradycardic and hypotensive when they arrived.  Subsequently she was given IV fluid and Narcan en route.  On arrival at ED noted to have /67, heart rate 45.  Patient placed in observation on hospital medicine service for further management.  Cardiology consulted.     Shortly after patient was admitted, she developed acute onset of severe chest pain.  EKG showed sinus bradycardia, with no significant change compared to EKG obtained earlier in the day.  On reexamination, chest pain reproducible was tearful and appears significantly more anxious than she has been previously.       Bradycardia resolved, BP improved. Started on IV Lasix. Patient continues to report intermittent chest pain that's reproducible as well as LE pain.  PT/OT evaluated patient and recommended HH vs SNF pending progress with acute intervention  UA concerning for UTI with positive nitrite. Hospital course complicated by urinary retention, which may be associated with UTI requiring in and out catheterization x2. Subsequently had atkins placed.  Discussed with patient recommendation to take Buspar dose scheduled rather than as needed and to follow up with psych as she states that she has not seen them in a couple of years.  7/13 NAEON . Atkins in placed . She is  complaining of anxiety and HA . She denies any chest pain or SOB . Medication adjuted . PT/OT rec SNF .  7/14 Pt was seen and examined at bedside . She was determine dot be  suitable for d/c   She was admitted with a Dx of generalized weakness , Bradycardia ,UTI , urinary retention and generalized weakness .  Urine cx (+) for e.coli sensitive to rocephin and flouroquinolones . S/P rocephin 1 gr qd x 2 and d/c on cipro 500 mg po bid x 3 days . Cardiology consulted for bradycardia . Pt medication adjusted , Buspar changed to Wellbutrin , Lyrica change to gabapentin  and flexeril change to robaxin .  PT/O rec SNF however pr decided to go home with HH .  Pt will be d/c with a atkins cath due to urinary retention . She will f/u with urology and her PCP in 1 to 2 weeks     At rehab now, working with PT      PHYSICAL: There were no vitals filed for this visit.   Wt Readings from Last 3 Encounters:   07/17/23 (!) 136.5 kg (301 lb)   07/17/23 (!) 136.6 kg (301 lb 2.4 oz)   07/13/23 (!) 145.9 kg (321 lb 10.4 oz)       JVD: no,    Heart rhythm: regular  Cardiac murmur: No    S3: no  S4: no  Lungs: clear  Liver span: 10 cm:   Hepatojugular reflux: no  Edema: no,       ASSESSMENT: acute on chronic diastolic HF    PLAN:      Patient Instructions:   Instruct the patient to notify this clinic if HH, a physician or an advanced care provider wants to change medication one of their HF medications   Activity and Diet restrictions:   Recommend 2-3 gram sodium restriction and 1500cc- 2000cc fluid restriction.  Encourage physical activity with graded exercise program.  Requested patient to weigh themselves daily, and to notify us if their weight increases by more than 3 lbs in 1 day or 5 lbs in 3 days.    Assigned dry weight on home scale: 136 kg  Medication changes (include current dose and changed dose)  Continue torsemide 20 mg daily  Entresto low dose BID  RTC 1 month, will cancel EP for next week for bradycardia  Physical therapy  HF education given

## 2023-07-28 ENCOUNTER — APPOINTMENT (OUTPATIENT)
Dept: RADIOLOGY | Facility: HOSPITAL | Age: 73
End: 2023-07-28
Attending: NURSE PRACTITIONER
Payer: MEDICARE

## 2023-07-28 DIAGNOSIS — I26.99 ACUTE PULMONARY EMBOLISM, UNSPECIFIED PULMONARY EMBOLISM TYPE, UNSPECIFIED WHETHER ACUTE COR PULMONALE PRESENT: ICD-10-CM

## 2023-07-28 DIAGNOSIS — C50.411 MALIGNANT NEOPLASM OF UPPER-OUTER QUADRANT OF RIGHT BREAST IN FEMALE, ESTROGEN RECEPTOR POSITIVE: ICD-10-CM

## 2023-07-28 DIAGNOSIS — Z79.811 LONG TERM (CURRENT) USE OF AROMATASE INHIBITORS: ICD-10-CM

## 2023-07-28 DIAGNOSIS — Z17.0 MALIGNANT NEOPLASM OF UPPER-OUTER QUADRANT OF RIGHT BREAST IN FEMALE, ESTROGEN RECEPTOR POSITIVE: ICD-10-CM

## 2023-07-28 PROCEDURE — 77080 DEXA BONE DENSITY SPINE HIP: ICD-10-PCS | Mod: 26,HCNC,, | Performed by: RADIOLOGY

## 2023-07-28 PROCEDURE — 77080 DXA BONE DENSITY AXIAL: CPT | Mod: TC,HCNC

## 2023-07-28 PROCEDURE — 77080 DXA BONE DENSITY AXIAL: CPT | Mod: 26,HCNC,, | Performed by: RADIOLOGY

## 2023-07-31 ENCOUNTER — TELEPHONE (OUTPATIENT)
Dept: ADMINISTRATIVE | Facility: HOSPITAL | Age: 73
End: 2023-07-31
Payer: MEDICARE

## 2023-07-31 ENCOUNTER — PATIENT MESSAGE (OUTPATIENT)
Dept: FAMILY MEDICINE | Facility: CLINIC | Age: 73
End: 2023-07-31
Payer: MEDICARE

## 2023-08-01 NOTE — PROGRESS NOTES
Subjective:       Patient ID: Susu Luther is a 73 y.o. female.    Chief Complaint:   1. Malignant neoplasm of upper-outer quadrant of right breast in female, estrogen receptor positive  Stage IIA (pT2, pN0(sn), cM0, G2, ER+, [place]-, HER2-) invasive lobular carcinoma of right breast      2. Acute pulmonary embolism, unspecified pulmonary embolism type, unspecified whether acute cor pulmonale present          Current Treatment:  Femara 2.5mg po daily      Eliquis 5mg po BID    Treatment History:  S/p lumpectomy & sentinel LN biopsy, right, Dr. Starks, 2/28/2019        XRT from 5/20/2019 to 6/18/2019    HPI: This is a 73-year-old obese  female with diastolic CHF HTN, major depressive disorder, sleep apnea, CVA, AAA repair, and Stage IIA (pT2, pN0(sn), cM0, G2, ER+, [place]-, HER2-) invasive lobular carcinoma of right breast, status post lumpectomy with sentinel lymph node biopsy in 2019.  She has since been on aromatase inhibitor and has been tolerating well.     Restaging PET-CT scan performed on 11/4/2020 revealed large hypermetabolic right subpectoral lymph node mass measuring 6.9 x 3.2 cm with SUV max of 13.0.     Right chest wall lymph node biopsy performed on 11/10/2020 revealed fragments of benign lymph node with no evidence of malignancy.  Excisional biopsy of right subpectoral lymph node was again performed on 12/2/2020 and returned as benign with no evidence of metastatic disease.     Case was discussed at the tumor conference and recommendation was to continue aromatase inhibitor with plan for repeat short interval imaging to reassess the right subpectoral lymph node mass.  This was discussed with patient, however, she was not satisfied with the recommendation and wanted a 2nd opinion with a different oncologist.     She was referred to breast surgeon in [facility]. She had MRI and has a planned surgical resection on 3/1/2021. Status post lymph node excision on 3/1/2021 that showed follicular  hyperplasia.    She is currently on Femara and Eliquis.    Interval History: Patient presents for follow up on Femara. She presents alone and reports having been hospitalized multiple times since her last visit. She was hospitalized in 6/2023; after being released, she fell in 7/2023 and was hospitalized again. She believes her fall was caused by one of her medications; she states it made her dizzy every time she took it. She reports having significantly elevated BP requiring ICU admission; her BP meds were changed, and her BP is better. She was discharged to Rehab because her legs were still weak. She uses a rolling walker as an assistive device, stating she was told to use it but not depend upon it. She states she feels much better now. CBC shows mild anemia despite no change in kidney function from CMP. Will check iron, ferritin today and inform her of results and recommendations via patient portal per her request. DEXA revealed normal bone density. MMG/US in 5/2023 was negative and surveillance MMG is due in a couple of months; will order. Advised her to continue Femara and Eliquis as well as calcium + vitamin D supplementation. She takes prescription vitamin D and gets calcium from dairy products.     Reviewed labs with patient:   CBC:   Recent Labs   Lab 07/28/23  1341   WBC 8.29   RBC 4.28   Hemoglobin 11.0 L   Hematocrit 35.5 L   Platelets 313   MCV 83   MCH 25.7 L   MCHC 31.0 L     CMP:  Recent Labs   Lab 07/28/23  1341   Glucose 93   Calcium 9.4   Albumin 3.1 L   Total Protein 7.0   Sodium 143   Potassium 3.9   CO2 24   Chloride 110   BUN 17   Creatinine 1.4   Alkaline Phosphatase 107   ALT 10   AST 12   Total Bilirubin 0.2     DXA Bone Density Spine And Hip  Narrative: EXAMINATION:  DXA BONE DENSITY AXIAL SKELETON 1 OR MORE SITES    CLINICAL HISTORY:  starting aromatase inhibitor therapy from breast cancer; Malignant neoplasm of upper-outer quadrant of right female breast    TECHNIQUE:  DXA scanning was  performed over the left hip and lumbar spine.  Review of the images confirms satisfactory positioning and technique.    COMPARISON:  07/27/2021    FINDINGS:  The L1 to L4 vertebral bone mineral density is equal to 1.289 g/cm squared with a T score of 0.8.  There has been no significant change relative to the prior study.    The left femoral neck bone mineral density is equal to 1.074 g/cm squared with a T score of 0.3.  There has been  no significant change relative to the prior study.  Impression: No evidence of significant bone density loss    Consider FDA approved medical therapies in postmenopausal women and men aged 50 years and older, based on the following:    *A hip or vertebral (clinical or morphometric) fracture  *T score less than or equal to -2.5 at the femoral neck or spine after appropriate evaluation to exclude secondary causes.  *Low bone mass -- also known as osteopenia (T score between -1.0 and -2.5 at the femoral neck or spine) and a 10 year probability of hip fracture greater than or equal to 3% or a 10 year probability of major osteoporosis-related fracture greater than or equal to 20% based on the [place]-adapted WHO algorithm.  *Clinicians judgment and/or patient preference may indicate treatment for people with 10 year fracture probabilities is above or below these levels.    Electronically signed by: Jeremy Mcclellan MD  Date:    07/30/2023  Time:    10:10    Social History     Socioeconomic History    Marital status:      Spouse name: Campos Luther    Number of children: 2   Tobacco Use    Smoking status: Never     Passive exposure: Past    Smokeless tobacco: Never   Substance and Sexual Activity    Alcohol use: No    Drug use: No    Sexual activity: Yes     Partners: Male     Birth control/protection: Post-menopausal     Social Determinants of Health     Financial Resource Strain: Low Risk  (7/18/2023)    Overall Financial Resource Strain (CARDI)     Difficulty of Paying Living  Expenses: Not hard at all   Food Insecurity: No Food Insecurity (7/18/2023)    Hunger Vital Sign     Worried About Running Out of Food in the Last Year: Never true     Ran Out of Food in the Last Year: Never true   Transportation Needs: No Transportation Needs (7/18/2023)    PRAPARE - Transportation     Lack of Transportation (Medical): No     Lack of Transportation (Non-Medical): No   Physical Activity: Inactive (7/18/2023)    Exercise Vital Sign     Days of Exercise per Week: 0 days     Minutes of Exercise per Session: 0 min   Stress: Stress Concern Present (7/18/2023)    [employer] - Occupational Stress Questionnaire     Feeling of Stress : To some extent   Social Connections: Socially Integrated (7/18/2023)    Social Connection and Isolation Panel [NHANES]     Frequency of Communication with Friends and Family: More than three times a week     Frequency of Social Gatherings with Friends and Family: Never     Attends [employer] Services: More than 4 times per year     Active Member of Clubs or Organizations: Yes     Attends Club or Organization Meetings: 1 to 4 times per year     Marital Status:    Housing Stability: Low Risk  (7/18/2023)    Housing Stability Vital Sign     Unable to Pay for Housing in the Last Year: No     Number of Places Lived in the Last Year: 1     Unstable Housing in the Last Year: No     Past Medical History:   Diagnosis Date    Cataract     Bilateral    Chronic diastolic congestive heart failure 08/25/2020    Dizziness 03/12/2023    Encounter for blood transfusion     History of repair of aneurysm of abdominal aorta using endovascular stent graft     DR Bonds    Hx of psychiatric care     Hypertension     Major depressive disorder, single episode, moderate with anxious distress 01/14/2020    Malignant neoplasm of upper-outer quadrant of right breast in female, estrogen receptor positive 03/14/2019    radiation    Psychiatric problem     Sleep apnea      Sleep difficulties     Stroke 2009    no residual defect    Therapy      Family History   Problem Relation Age of Onset    Diabetes Maternal Grandmother     Diabetes Maternal Grandfather     Diabetes Mother     Hypertension Mother     Sickle cell anemia Daughter     Breast cancer Neg Hx     Colon cancer Neg Hx     Ovarian cancer Neg Hx      Past Surgical History:   Procedure Laterality Date    APPENDECTOMY      AXILLARY NODE DISSECTION Right 2020    Procedure: LYMPHADENECTOMY, AXILLARY;  Surgeon: Artemio Starks MD;  Location: [facility];  Service: General;  Laterality: Right;    BIOPSY OF AXILLARY NODE Right 2021    Procedure: BIOPSY, LYMPH NODE, AXILLARY;  Surgeon: Artemio Sutton MD;  Location: [address];  Service: General;  Laterality: Right;    BREAST BIOPSY      2019    BREAST LUMPECTOMY      2019     SECTION      x 1    OOPHORECTOMY      right     SENTINEL LYMPH NODE BIOPSY Right 2019    Procedure: BIOPSY, LYMPH NODE, SENTINEL;  Surgeon: Artemio Starks MD;  Location: [facility];  Service: General;  Laterality: Right;    splenic artery aneurysm repair      TONSILLECTOMY       Review of Systems   Constitutional:  Negative for appetite change and fatigue.   HENT:  Negative for mouth sores, rhinorrhea and sore throat.    Eyes: Negative.    Respiratory: Negative.     Cardiovascular: Negative.    Gastrointestinal:  Negative for constipation, diarrhea, nausea and vomiting.   Genitourinary: Negative.  Positive for hot flashes.   Musculoskeletal:  Positive for arthralgias (mild, improved).        Limited ROM due to muscle tension at surgical site   Integumentary:  Negative.   Allergic/Immunologic: Negative.    Neurological:  Negative for weakness and numbness.   Hematological:  Does not bruise/bleed easily.   Psychiatric/Behavioral: Negative.         Medication List with Changes/Refills   Current Medications    ALPRAZOLAM (XANAX) 0.5 MG TABLET    Take 1 tablet (0.5 mg total) by mouth  2 (two) times daily as needed for Anxiety.    ALPRAZOLAM (XANAX) 0.5 MG TABLET    Take 1 tablet by mouth twice daily as needed    AMLODIPINE (NORVASC) 10 MG TABLET    Take 1 tablet (10 mg total) by mouth once daily.    APIXABAN (ELIQUIS) 5 MG TAB    Take 1 tablet (5 mg total) by mouth 2 (two) times daily.    ATORVASTATIN (LIPITOR) 20 MG TABLET    Take 1 tablet (20 mg total) by mouth once daily.    BUPROPION (WELLBUTRIN XL) 150 MG TB24 TABLET    Take 1 tablet (150 mg total) by mouth once daily.    BUTALBITAL-ACETAMINOPHEN-CAFFEINE -40 MG (FIORICET, ESGIC) -40 MG PER TABLET    TAKE 1 TABLET DAILY AS NEEDED FOR PAIN OR HEADACHES.    BUTALBITAL-ACETAMINOPHEN-CAFFEINE -40 MG (FIORICET, ESGIC) -40 MG PER TABLET    TAKE 1 TABLET DAILY AS NEEDED FOR PAIN OR HEADACHES.    BUTALBITAL-ACETAMINOPHEN-CAFFEINE -40 MG (FIORICET, ESGIC) -40 MG PER TABLET    Take 1 tablet by mouth daily as needed for headache    CHOLECALCIFEROL, VITAMIN D3, 1,250 MCG (50,000 UNIT) CAPSULE    Take 1 capsule (50,000 Units total) by mouth once a week.    CLONIDINE (CATAPRES) 0.1 MG TABLET    Take 2 tablets (0.2 mg total) by mouth 2 (two) times daily as needed (BP>170/95).    DICLOFENAC SODIUM (VOLTAREN) 1 % GEL    Apply 2 grams topically once daily.    GABAPENTIN (NEURONTIN) 300 MG CAPSULE    Take 1 capsule (300 mg total) by mouth 3 (three) times daily.    GABAPENTIN (NEURONTIN) 400 MG CAPSULE    Take 1 capsule by mouth three times daily    HYDRALAZINE (APRESOLINE) 25 MG TABLET    Take 1 tablet (25 mg total) by mouth 3 (three) times daily.    LETROZOLE (FEMARA) 2.5 MG TAB    Take 1 tablet (2.5 mg total) by mouth once daily.    LEVOCETIRIZINE (XYZAL) 5 MG TABLET    Take 1 tablet (5 mg total) by mouth every evening.    LIDOCAINE (LIDODERM) 5 %    Place 2 patches onto the skin once daily. Remove & Discard patch within 12 hours or as directed by MD    MULTIVITAMIN (THERAGRAN) PER TABLET    Take 1 tablet by mouth once  daily.    OXYCODONE-ACETAMINOPHEN (PERCOCET) 7.5-325 MG PER TABLET    Take 1 tablet by mouth every 6 (six) hours as needed for Pain.    RANOLAZINE (RANEXA) 1,000 MG TB12    Take 1 tablet (1,000 mg total) by mouth 2 (two) times daily.    SACUBITRIL-VALSARTAN (ENTRESTO) 24-26 MG PER TABLET    Take 1 tablet by mouth 2 (two) times daily.    TAMSULOSIN (FLOMAX) 0.4 MG CAP    Take 1 capsule (0.4 mg total) by mouth once daily.    TORSEMIDE (DEMADEX) 20 MG TAB    Take 1 tablet (20 mg total) by mouth once daily.    TRAMADOL (ULTRAM) 50 MG TABLET    Take 1 tablet by mouth every 6 hours as needed    ZOLPIDEM (AMBIEN) 5 MG TAB    Take 1 tablet (5 mg total) by mouth nightly as needed (sleep).     Objective:     Vitals:    08/03/23 1405   BP: 127/84   Pulse: 69   Temp: 98.1 °F (36.7 °C)     Physical Exam  Vitals reviewed.   Constitutional:       Appearance: Normal appearance. She is obese.   HENT:      Head: Normocephalic.   Eyes:      Extraocular Movements: Extraocular movements intact.      Pupils: Pupils are equal, round, and reactive to light.      Comments: Glasses       Cardiovascular:      Rate and Rhythm: Normal rate and regular rhythm.      Heart sounds: Normal heart sounds.   Pulmonary:      Effort: Pulmonary effort is normal.      Breath sounds: Normal breath sounds.   Abdominal:      General: Bowel sounds are normal.      Palpations: Abdomen is soft.      Comments: rounded     Genitourinary:     Comments: deferred    Musculoskeletal:      Cervical back: Normal range of motion and neck supple.      Comments: Ambulates with rolling walker   Skin:     General: Skin is warm and dry.      Findings: No bruising (left hand, mild).   Neurological:      Mental Status: She is alert and oriented to person, place, and time.   Psychiatric:         Behavior: Behavior normal.         Thought Content: Thought content normal.          (2) Ambulatory and capable of self care, unable to carry out work activity, up and about > 50% or  waking hours  Assessment:     Problem List Items Addressed This Visit          Hematology    Acute pulmonary embolism     Likely provoked due to hypercoagulable state from breast cancer and obesity. Continue apixaban at 5 mg b.i.d..  Advised that she will be on long-term anticoagulation given recurrent thrombosis history as well as active malignancy.            Oncology    Malignant neoplasm of upper-outer quadrant of right breast in female, estrogen receptor positive - Primary (Chronic)     Diagnosed with Stage IIA (pT2, pN0(sn), cM0, G2, ER+, [place]-, HER2-) invasive lobular carcinoma of right breast, status post lumpectomy with sentinel lymph node biopsy in 2019.  Currently on Femara and tolerating well.       Unfortunately, imaging studies have shown persistently enlarged right subpectoral lymph node as well as FDG avid left posterior cervical and supraclavicular lymph nodes. Multiple biopsies in the past have been nondiagnostic for malignancy.  Case was presented at multi-disciplinary tumor conference with recommendation to continue Arimidex.  Patient was not satisfied with recommendation a requested for 2nd opinion.     She was evaluated by Dr. Sutton in [place] who recommended MRI of breast.     MRI was performed on 2/23/2021 and showed lymph node mass deep to the right pectoralis minor muscle measuring 6.6 x 4.7 x 2.9 cm. There were also adjacent satellite lymph nodes anteriorly to the dominant lymph node measured 1.1 x 0.8 cm and 0.6 x 0.4 cm.  Also described was a mass effect upon the right subclavian vein.  The mass does not in case or envelope the subclavian neurovascular structures.     Given the above findings, recommendation was made to excise the mass if not for diagnostic purposes for pain management.  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 No evidence of metastatic carcinoma or lymphoma.     She was continued on Femara with  plan for short interval PET scan.     Patient had a PET scan on 4/14/2021 that showed interim right axillary lymph node excision with large residual hypermetabolic right subpectoral lymph node mass.  Also noted interval development of subcentimeter mildly FDG avid left posterior cervical and supraclavicular lymph nodes. No FDG avid mediastinal or hilar nodes.No FDG avid pulmonary nodules/masses.      Had diagnostic bilateral mammography on 9/24/2021, results showed a left breast subcentimeter mass at the 9 o'clock position which is new when compared to mammography from 2019.  Will plan a short-interval mammogram in 6 months to re-assess left breast mass.There was no mammographic evidence of disease in the right breast.     Saw Dr. Sutton in [place] who recommended a repeat PET-CT scan. Results from PET-CT scan showed interval enlargement of subpectoral mass on the right with sustained highly FDG avidity.     She continued to complain of shoulder pain and breast discomfort.  CT neck chest with contrast done on 3/17/2022, when compared to PET scan from 10/ 2021, the right subpectoral lymph node has not changed and still measures about 6 cm maximally.     Given continuing discomfort and pain associated with these enlarged lymph nodes.  She was referred to surgical oncologist following extensive conversation, it appears the risk of repeat surgical resection outweighs the benefits.  Decision was made to optimize neuropathic pain management.     Lyrica was initiated for neuropathic pain control. Continue Femara and pain management. Surveillance MMG from 10/2022 was BIRADS 2; recommend repeat in 1 year. Normal bone density on DEXA in 7/2023.          Anemia     Plan:     Malignant neoplasm of upper-outer quadrant of right breast in female, estrogen receptor positive    Acute pulmonary embolism, unspecified pulmonary embolism type, unspecified whether acute cor pulmonale present    Anemia, unspecified type    Labs  reviewed; mild recurrent anemia; will check iron, ferritin today.   Will communicate results and recommendations via patient portal per her request.  Continue Femara and Eliquis as prescribed; no refills requested.   Continue vitamin D and calcium supplementation daily.   Follow up in 6 months  with CBC and Comprehensive Metabolic Panel.  Repeat DEXA due 7/2025.   Surveillance MMG due 10/2023.  Patient to resume postmastectomy exercises to improve ROM and muscle tension at surgical site.    Per NCCN Guidelines BINV-17:  H&P 1-4 times per year as clinically appropriate for 5 years then annually  Periodic screening for changes in family history and genetic testing indications and referral to genetic counseling as indicated  Educate, monitor, and refer for lymphedema management  MMG every 12 months  Routine imaging of reconstructed breast is not indicated  For patients receiving anthracycline-based therapy, see NCCN Guidelines for Survivorship for echocardiogram recommendations  In the absence of clinical signs and symptoms suggestive of recurrent disease, there is no indication for lab or imaging studies for metastases screening    Route Chart for Scheduling    Med Onc Chart Routing      Follow up with physician    Follow up with CELESTINA 6 months. Jaylyn   Infusion scheduling note    Injection scheduling note    Labs Ferritin, iron and TIBC, CBC and CMP   Scheduling:  Preferred lab:  Lab interval:  iron, ferritin today; CBC, CMP in 6 months   Imaging Mammogram   schedule for 10/2023   Pharmacy appointment No pharmacy appointment needed      Other referrals     No additional referrals needed            I will review assessment/plan with collaborating physician.        JACKELINE Stewart

## 2023-08-01 NOTE — ASSESSMENT & PLAN NOTE
Diagnosed with Stage IIA (pT2, pN0(sn), cM0, G2, ER+, [place]-, HER2-) invasive lobular carcinoma of right breast, status post lumpectomy with sentinel lymph node biopsy in 2019.  Currently on Femara and tolerating well.       Unfortunately, imaging studies have shown persistently enlarged right subpectoral lymph node as well as FDG avid left posterior cervical and supraclavicular lymph nodes. Multiple biopsies in the past have been nondiagnostic for malignancy.  Case was presented at multi-disciplinary tumor conference with recommendation to continue Arimidex.  Patient was not satisfied with recommendation a requested for 2nd opinion.     She was evaluated by Dr. Sutton in [place] who recommended MRI of breast.     MRI was performed on 2/23/2021 and showed lymph node mass deep to the right pectoralis minor muscle measuring 6.6 x 4.7 x 2.9 cm. There were also adjacent satellite lymph nodes anteriorly to the dominant lymph node measured 1.1 x 0.8 cm and 0.6 x 0.4 cm.  Also described was a mass effect upon the right subclavian vein.  The mass does not in case or envelope the subclavian neurovascular structures.     Given the above findings, recommendation was made to excise the mass if not for diagnostic purposes for pain management.  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 No evidence of metastatic carcinoma or lymphoma.     She was continued on Femara with plan for short interval PET scan.     Patient had a PET scan on 4/14/2021 that showed interim right axillary lymph node excision with large residual hypermetabolic right subpectoral lymph node mass.  Also noted interval development of subcentimeter mildly FDG avid left posterior cervical and supraclavicular lymph nodes. No FDG avid mediastinal or hilar nodes.No FDG avid pulmonary nodules/masses.      Had diagnostic bilateral mammography on 9/24/2021, results showed a left breast  subcentimeter mass at the 9 o'clock position which is new when compared to mammography from 2019.  Will plan a short-interval mammogram in 6 months to re-assess left breast mass.There was no mammographic evidence of disease in the right breast.     Saw Dr. Sutton in [place] who recommended a repeat PET-CT scan. Results from PET-CT scan showed interval enlargement of subpectoral mass on the right with sustained highly FDG avidity.     She continued to complain of shoulder pain and breast discomfort.  CT neck chest with contrast done on 3/17/2022, when compared to PET scan from 10/ 2021, the right subpectoral lymph node has not changed and still measures about 6 cm maximally.     Given continuing discomfort and pain associated with these enlarged lymph nodes.  She was referred to surgical oncologist following extensive conversation, it appears the risk of repeat surgical resection outweighs the benefits.  Decision was made to optimize neuropathic pain management.     Lyrica was initiated for neuropathic pain control. Continue Femara and pain management. Surveillance MMG from 10/2022 was BIRADS 2; recommend repeat in 1 year. Normal bone density on DEXA in 7/2023.

## 2023-08-02 ENCOUNTER — OUTPATIENT CASE MANAGEMENT (OUTPATIENT)
Dept: ADMINISTRATIVE | Facility: OTHER | Age: 73
End: 2023-08-02
Payer: MEDICARE

## 2023-08-02 DIAGNOSIS — E55.9 VITAMIN D DEFICIENCY: ICD-10-CM

## 2023-08-02 DIAGNOSIS — Z17.0 MALIGNANT NEOPLASM OF UPPER-OUTER QUADRANT OF RIGHT BREAST IN FEMALE, ESTROGEN RECEPTOR POSITIVE: ICD-10-CM

## 2023-08-02 DIAGNOSIS — F54 PSYCHOLOGICAL FACTORS AFFECTING MEDICAL CONDITION: ICD-10-CM

## 2023-08-02 DIAGNOSIS — E78.5 HYPERLIPIDEMIA, UNSPECIFIED HYPERLIPIDEMIA TYPE: ICD-10-CM

## 2023-08-02 DIAGNOSIS — M79.605 BILATERAL LEG PAIN: ICD-10-CM

## 2023-08-02 DIAGNOSIS — I10 HYPERTENSION, UNSPECIFIED TYPE: Primary | ICD-10-CM

## 2023-08-02 DIAGNOSIS — R51.9 NONINTRACTABLE HEADACHE, UNSPECIFIED CHRONICITY PATTERN, UNSPECIFIED HEADACHE TYPE: ICD-10-CM

## 2023-08-02 DIAGNOSIS — M79.604 BILATERAL LEG PAIN: ICD-10-CM

## 2023-08-02 DIAGNOSIS — J30.2 SEASONAL ALLERGIES: ICD-10-CM

## 2023-08-02 DIAGNOSIS — F32.1 MAJOR DEPRESSIVE DISORDER, SINGLE EPISODE, MODERATE WITH ANXIOUS DISTRESS: ICD-10-CM

## 2023-08-02 DIAGNOSIS — I26.99 ACUTE PULMONARY EMBOLISM, UNSPECIFIED PULMONARY EMBOLISM TYPE, UNSPECIFIED WHETHER ACUTE COR PULMONALE PRESENT: ICD-10-CM

## 2023-08-02 DIAGNOSIS — C50.411 MALIGNANT NEOPLASM OF UPPER-OUTER QUADRANT OF RIGHT BREAST IN FEMALE, ESTROGEN RECEPTOR POSITIVE: ICD-10-CM

## 2023-08-02 RX ORDER — HYDRALAZINE HYDROCHLORIDE 25 MG/1
25 TABLET, FILM COATED ORAL 3 TIMES DAILY
Qty: 60 TABLET | Refills: 0 | Status: SHIPPED | OUTPATIENT
Start: 2023-08-02 | End: 2023-09-15 | Stop reason: SDUPTHER

## 2023-08-02 RX ORDER — BUTALBITAL, ACETAMINOPHEN AND CAFFEINE 50; 325; 40 MG/1; MG/1; MG/1
TABLET ORAL
Qty: 30 TABLET | Refills: 1 | Status: SHIPPED | OUTPATIENT
Start: 2023-08-02 | End: 2023-10-06

## 2023-08-02 RX ORDER — ZOLPIDEM TARTRATE 5 MG/1
5 TABLET ORAL NIGHTLY PRN
Qty: 90 TABLET | Refills: 1 | Status: SHIPPED | OUTPATIENT
Start: 2023-08-02 | End: 2023-10-06 | Stop reason: SDUPTHER

## 2023-08-02 RX ORDER — LEVOCETIRIZINE DIHYDROCHLORIDE 5 MG/1
5 TABLET, FILM COATED ORAL NIGHTLY
Qty: 90 TABLET | Refills: 0 | Status: SHIPPED | OUTPATIENT
Start: 2023-08-02 | End: 2023-10-06 | Stop reason: SDUPTHER

## 2023-08-02 RX ORDER — ASPIRIN 325 MG
50000 TABLET, DELAYED RELEASE (ENTERIC COATED) ORAL WEEKLY
Qty: 12 CAPSULE | Refills: 0 | Status: SHIPPED | OUTPATIENT
Start: 2023-08-02 | End: 2023-09-08 | Stop reason: SDUPTHER

## 2023-08-02 RX ORDER — AMLODIPINE BESYLATE 10 MG/1
10 TABLET ORAL DAILY
Qty: 90 TABLET | Refills: 3 | Status: SHIPPED | OUTPATIENT
Start: 2023-08-02 | End: 2023-09-15 | Stop reason: SDUPTHER

## 2023-08-02 RX ORDER — TORSEMIDE 20 MG/1
20 TABLET ORAL DAILY
Qty: 30 TABLET | Refills: 0 | Status: SHIPPED | OUTPATIENT
Start: 2023-08-02 | End: 2023-09-15 | Stop reason: SDUPTHER

## 2023-08-02 RX ORDER — DICLOFENAC SODIUM 10 MG/G
2 GEL TOPICAL DAILY
Qty: 400 G | Refills: 1 | Status: SHIPPED | OUTPATIENT
Start: 2023-08-02 | End: 2023-10-24 | Stop reason: SDUPTHER

## 2023-08-02 RX ORDER — RANOLAZINE 1000 MG/1
1000 TABLET, EXTENDED RELEASE ORAL 2 TIMES DAILY
Qty: 180 TABLET | Refills: 3 | Status: SHIPPED | OUTPATIENT
Start: 2023-08-02 | End: 2023-08-18 | Stop reason: SDUPTHER

## 2023-08-02 RX ORDER — TAMSULOSIN HYDROCHLORIDE 0.4 MG/1
0.4 CAPSULE ORAL DAILY
Qty: 30 CAPSULE | Refills: 0 | Status: SHIPPED | OUTPATIENT
Start: 2023-08-02 | End: 2023-11-08 | Stop reason: SDUPTHER

## 2023-08-02 RX ORDER — ALPRAZOLAM 0.5 MG/1
0.5 TABLET ORAL 2 TIMES DAILY PRN
Qty: 180 TABLET | Refills: 0 | Status: SHIPPED | OUTPATIENT
Start: 2023-08-02 | End: 2023-10-06 | Stop reason: SDUPTHER

## 2023-08-02 RX ORDER — GABAPENTIN 300 MG/1
300 CAPSULE ORAL 3 TIMES DAILY
Qty: 90 CAPSULE | Refills: 1 | Status: SHIPPED | OUTPATIENT
Start: 2023-08-02 | End: 2023-08-07

## 2023-08-02 RX ORDER — ATORVASTATIN CALCIUM 20 MG/1
20 TABLET, FILM COATED ORAL DAILY
Qty: 90 TABLET | Refills: 3 | Status: SHIPPED | OUTPATIENT
Start: 2023-08-02 | End: 2023-10-06 | Stop reason: SDUPTHER

## 2023-08-02 RX ORDER — LIDOCAINE 50 MG/G
2 PATCH TOPICAL DAILY
Qty: 90 PATCH | Refills: 1 | Status: SHIPPED | OUTPATIENT
Start: 2023-08-02 | End: 2023-10-14 | Stop reason: SDUPTHER

## 2023-08-03 ENCOUNTER — OFFICE VISIT (OUTPATIENT)
Dept: HEMATOLOGY/ONCOLOGY | Facility: CLINIC | Age: 73
End: 2023-08-03
Payer: MEDICARE

## 2023-08-03 ENCOUNTER — LAB VISIT (OUTPATIENT)
Dept: LAB | Facility: HOSPITAL | Age: 73
End: 2023-08-03
Attending: NURSE PRACTITIONER
Payer: MEDICARE

## 2023-08-03 VITALS
HEIGHT: 72 IN | BODY MASS INDEX: 39.68 KG/M2 | TEMPERATURE: 98 F | SYSTOLIC BLOOD PRESSURE: 127 MMHG | DIASTOLIC BLOOD PRESSURE: 84 MMHG | OXYGEN SATURATION: 99 % | WEIGHT: 293 LBS | HEART RATE: 69 BPM

## 2023-08-03 DIAGNOSIS — D64.9 ANEMIA, UNSPECIFIED TYPE: ICD-10-CM

## 2023-08-03 DIAGNOSIS — C50.411 MALIGNANT NEOPLASM OF UPPER-OUTER QUADRANT OF RIGHT BREAST IN FEMALE, ESTROGEN RECEPTOR POSITIVE: ICD-10-CM

## 2023-08-03 DIAGNOSIS — I26.99 ACUTE PULMONARY EMBOLISM, UNSPECIFIED PULMONARY EMBOLISM TYPE, UNSPECIFIED WHETHER ACUTE COR PULMONALE PRESENT: ICD-10-CM

## 2023-08-03 DIAGNOSIS — C50.411 MALIGNANT NEOPLASM OF UPPER-OUTER QUADRANT OF RIGHT BREAST IN FEMALE, ESTROGEN RECEPTOR POSITIVE: Primary | ICD-10-CM

## 2023-08-03 DIAGNOSIS — Z17.0 MALIGNANT NEOPLASM OF UPPER-OUTER QUADRANT OF RIGHT BREAST IN FEMALE, ESTROGEN RECEPTOR POSITIVE: ICD-10-CM

## 2023-08-03 DIAGNOSIS — Z17.0 MALIGNANT NEOPLASM OF UPPER-OUTER QUADRANT OF RIGHT BREAST IN FEMALE, ESTROGEN RECEPTOR POSITIVE: Primary | ICD-10-CM

## 2023-08-03 LAB
ALBUMIN SERPL BCP-MCNC: 3.2 G/DL (ref 3.5–5.2)
ALP SERPL-CCNC: 115 U/L (ref 55–135)
ALT SERPL W/O P-5'-P-CCNC: 15 U/L (ref 10–44)
ANION GAP SERPL CALC-SCNC: 11 MMOL/L (ref 8–16)
AST SERPL-CCNC: 51 U/L (ref 10–40)
BASOPHILS # BLD AUTO: 0.06 K/UL (ref 0–0.2)
BASOPHILS NFR BLD: 0.7 % (ref 0–1.9)
BILIRUB SERPL-MCNC: 0.2 MG/DL (ref 0.1–1)
BUN SERPL-MCNC: 23 MG/DL (ref 8–23)
CALCIUM SERPL-MCNC: 9.5 MG/DL (ref 8.7–10.5)
CHLORIDE SERPL-SCNC: 107 MMOL/L (ref 95–110)
CO2 SERPL-SCNC: 25 MMOL/L (ref 23–29)
CREAT SERPL-MCNC: 2 MG/DL (ref 0.5–1.4)
DIFFERENTIAL METHOD: ABNORMAL
EOSINOPHIL # BLD AUTO: 0.1 K/UL (ref 0–0.5)
EOSINOPHIL NFR BLD: 1.5 % (ref 0–8)
ERYTHROCYTE [DISTWIDTH] IN BLOOD BY AUTOMATED COUNT: 15.5 % (ref 11.5–14.5)
EST. GFR  (NO RACE VARIABLE): 26 ML/MIN/1.73 M^2
FERRITIN SERPL-MCNC: 40 NG/ML (ref 20–300)
GLUCOSE SERPL-MCNC: 99 MG/DL (ref 70–110)
HCT VFR BLD AUTO: 37.2 % (ref 37–48.5)
HGB BLD-MCNC: 11.8 G/DL (ref 12–16)
IMM GRANULOCYTES # BLD AUTO: 0.03 K/UL (ref 0–0.04)
IMM GRANULOCYTES NFR BLD AUTO: 0.3 % (ref 0–0.5)
IRON SERPL-MCNC: 52 UG/DL (ref 30–160)
LYMPHOCYTES # BLD AUTO: 3.1 K/UL (ref 1–4.8)
LYMPHOCYTES NFR BLD: 33.7 % (ref 18–48)
MCH RBC QN AUTO: 25.8 PG (ref 27–31)
MCHC RBC AUTO-ENTMCNC: 31.7 G/DL (ref 32–36)
MCV RBC AUTO: 81 FL (ref 82–98)
MONOCYTES # BLD AUTO: 0.7 K/UL (ref 0.3–1)
MONOCYTES NFR BLD: 7.5 % (ref 4–15)
NEUTROPHILS # BLD AUTO: 5.1 K/UL (ref 1.8–7.7)
NEUTROPHILS NFR BLD: 56.3 % (ref 38–73)
NRBC BLD-RTO: 0 /100 WBC
PLATELET # BLD AUTO: 311 K/UL (ref 150–450)
PMV BLD AUTO: 9.7 FL (ref 9.2–12.9)
POTASSIUM SERPL-SCNC: 4 MMOL/L (ref 3.5–5.1)
PROT SERPL-MCNC: 7.2 G/DL (ref 6–8.4)
RBC # BLD AUTO: 4.57 M/UL (ref 4–5.4)
SATURATED IRON: 17 % (ref 20–50)
SODIUM SERPL-SCNC: 143 MMOL/L (ref 136–145)
TOTAL IRON BINDING CAPACITY: 309 UG/DL (ref 250–450)
TRANSFERRIN SERPL-MCNC: 209 MG/DL (ref 200–375)
WBC # BLD AUTO: 9.05 K/UL (ref 3.9–12.7)

## 2023-08-03 PROCEDURE — 99215 OFFICE O/P EST HI 40 MIN: CPT | Mod: HCNC,S$GLB,, | Performed by: NURSE PRACTITIONER

## 2023-08-03 PROCEDURE — 99215 PR OFFICE/OUTPT VISIT, EST, LEVL V, 40-54 MIN: ICD-10-PCS | Mod: HCNC,S$GLB,, | Performed by: NURSE PRACTITIONER

## 2023-08-03 PROCEDURE — 1100F PR PT FALLS ASSESS DOC 2+ FALLS/FALL W/INJURY/YR: ICD-10-PCS | Mod: HCNC,CPTII,S$GLB, | Performed by: NURSE PRACTITIONER

## 2023-08-03 PROCEDURE — 85025 COMPLETE CBC W/AUTO DIFF WBC: CPT | Mod: HCNC | Performed by: NURSE PRACTITIONER

## 2023-08-03 PROCEDURE — 99999 PR PBB SHADOW E&M-EST. PATIENT-LVL V: CPT | Mod: PBBFAC,HCNC,, | Performed by: NURSE PRACTITIONER

## 2023-08-03 PROCEDURE — 1160F RVW MEDS BY RX/DR IN RCRD: CPT | Mod: HCNC,CPTII,S$GLB, | Performed by: NURSE PRACTITIONER

## 2023-08-03 PROCEDURE — 1157F PR ADVANCE CARE PLAN OR EQUIV PRESENT IN MEDICAL RECORD: ICD-10-PCS | Mod: HCNC,CPTII,S$GLB, | Performed by: NURSE PRACTITIONER

## 2023-08-03 PROCEDURE — 99999 PR PBB SHADOW E&M-EST. PATIENT-LVL V: ICD-10-PCS | Mod: PBBFAC,HCNC,, | Performed by: NURSE PRACTITIONER

## 2023-08-03 PROCEDURE — 4010F PR ACE/ARB THEARPY RXD/TAKEN: ICD-10-PCS | Mod: HCNC,CPTII,S$GLB, | Performed by: NURSE PRACTITIONER

## 2023-08-03 PROCEDURE — 3008F BODY MASS INDEX DOCD: CPT | Mod: HCNC,CPTII,S$GLB, | Performed by: NURSE PRACTITIONER

## 2023-08-03 PROCEDURE — 1125F PR PAIN SEVERITY QUANTIFIED, PAIN PRESENT: ICD-10-PCS | Mod: HCNC,CPTII,S$GLB, | Performed by: NURSE PRACTITIONER

## 2023-08-03 PROCEDURE — 36415 COLL VENOUS BLD VENIPUNCTURE: CPT | Mod: HCNC | Performed by: NURSE PRACTITIONER

## 2023-08-03 PROCEDURE — 82728 ASSAY OF FERRITIN: CPT | Mod: HCNC | Performed by: NURSE PRACTITIONER

## 2023-08-03 PROCEDURE — 4010F ACE/ARB THERAPY RXD/TAKEN: CPT | Mod: HCNC,CPTII,S$GLB, | Performed by: NURSE PRACTITIONER

## 2023-08-03 PROCEDURE — 84466 ASSAY OF TRANSFERRIN: CPT | Mod: HCNC | Performed by: NURSE PRACTITIONER

## 2023-08-03 PROCEDURE — 3288F PR FALLS RISK ASSESSMENT DOCUMENTED: ICD-10-PCS | Mod: HCNC,CPTII,S$GLB, | Performed by: NURSE PRACTITIONER

## 2023-08-03 PROCEDURE — 1159F PR MEDICATION LIST DOCUMENTED IN MEDICAL RECORD: ICD-10-PCS | Mod: HCNC,CPTII,S$GLB, | Performed by: NURSE PRACTITIONER

## 2023-08-03 PROCEDURE — 1100F PTFALLS ASSESS-DOCD GE2>/YR: CPT | Mod: HCNC,CPTII,S$GLB, | Performed by: NURSE PRACTITIONER

## 2023-08-03 PROCEDURE — 1159F MED LIST DOCD IN RCRD: CPT | Mod: HCNC,CPTII,S$GLB, | Performed by: NURSE PRACTITIONER

## 2023-08-03 PROCEDURE — 1157F ADVNC CARE PLAN IN RCRD: CPT | Mod: HCNC,CPTII,S$GLB, | Performed by: NURSE PRACTITIONER

## 2023-08-03 PROCEDURE — 3079F DIAST BP 80-89 MM HG: CPT | Mod: HCNC,CPTII,S$GLB, | Performed by: NURSE PRACTITIONER

## 2023-08-03 PROCEDURE — 1160F PR REVIEW ALL MEDS BY PRESCRIBER/CLIN PHARMACIST DOCUMENTED: ICD-10-PCS | Mod: HCNC,CPTII,S$GLB, | Performed by: NURSE PRACTITIONER

## 2023-08-03 PROCEDURE — 3079F PR MOST RECENT DIASTOLIC BLOOD PRESSURE 80-89 MM HG: ICD-10-PCS | Mod: HCNC,CPTII,S$GLB, | Performed by: NURSE PRACTITIONER

## 2023-08-03 PROCEDURE — 80053 COMPREHEN METABOLIC PANEL: CPT | Mod: HCNC | Performed by: NURSE PRACTITIONER

## 2023-08-03 PROCEDURE — 3074F PR MOST RECENT SYSTOLIC BLOOD PRESSURE < 130 MM HG: ICD-10-PCS | Mod: HCNC,CPTII,S$GLB, | Performed by: NURSE PRACTITIONER

## 2023-08-03 PROCEDURE — 1125F AMNT PAIN NOTED PAIN PRSNT: CPT | Mod: HCNC,CPTII,S$GLB, | Performed by: NURSE PRACTITIONER

## 2023-08-03 PROCEDURE — 3288F FALL RISK ASSESSMENT DOCD: CPT | Mod: HCNC,CPTII,S$GLB, | Performed by: NURSE PRACTITIONER

## 2023-08-03 PROCEDURE — 83540 ASSAY OF IRON: CPT | Mod: HCNC | Performed by: NURSE PRACTITIONER

## 2023-08-03 PROCEDURE — 3074F SYST BP LT 130 MM HG: CPT | Mod: HCNC,CPTII,S$GLB, | Performed by: NURSE PRACTITIONER

## 2023-08-03 PROCEDURE — 3008F PR BODY MASS INDEX (BMI) DOCUMENTED: ICD-10-PCS | Mod: HCNC,CPTII,S$GLB, | Performed by: NURSE PRACTITIONER

## 2023-08-04 ENCOUNTER — PATIENT MESSAGE (OUTPATIENT)
Dept: HEMATOLOGY/ONCOLOGY | Facility: CLINIC | Age: 73
End: 2023-08-04
Payer: MEDICARE

## 2023-08-07 ENCOUNTER — TELEPHONE (OUTPATIENT)
Dept: FAMILY MEDICINE | Facility: CLINIC | Age: 73
End: 2023-08-07

## 2023-08-07 ENCOUNTER — OFFICE VISIT (OUTPATIENT)
Dept: FAMILY MEDICINE | Facility: CLINIC | Age: 73
End: 2023-08-07
Payer: MEDICARE

## 2023-08-07 VITALS
RESPIRATION RATE: 18 BRPM | BODY MASS INDEX: 39.68 KG/M2 | HEIGHT: 72 IN | TEMPERATURE: 98 F | WEIGHT: 293 LBS | HEART RATE: 73 BPM | SYSTOLIC BLOOD PRESSURE: 118 MMHG | DIASTOLIC BLOOD PRESSURE: 70 MMHG | OXYGEN SATURATION: 97 %

## 2023-08-07 DIAGNOSIS — Z09 HOSPITAL DISCHARGE FOLLOW-UP: ICD-10-CM

## 2023-08-07 DIAGNOSIS — D64.9 ANEMIA, UNSPECIFIED TYPE: Primary | ICD-10-CM

## 2023-08-07 DIAGNOSIS — M25.511 RIGHT SHOULDER PAIN, UNSPECIFIED CHRONICITY: ICD-10-CM

## 2023-08-07 DIAGNOSIS — M25.559 HIP PAIN, UNSPECIFIED LATERALITY: Primary | ICD-10-CM

## 2023-08-07 DIAGNOSIS — E66.01 CLASS 3 SEVERE OBESITY WITH SERIOUS COMORBIDITY AND BODY MASS INDEX (BMI) OF 40.0 TO 44.9 IN ADULT, UNSPECIFIED OBESITY TYPE: Chronic | ICD-10-CM

## 2023-08-07 PROBLEM — R07.89 OTHER CHEST PAIN: Status: RESOLVED | Noted: 2022-02-15 | Resolved: 2023-08-07

## 2023-08-07 PROBLEM — R06.02 SHORTNESS OF BREATH ON EXERTION: Status: RESOLVED | Noted: 2018-11-28 | Resolved: 2023-08-07

## 2023-08-07 PROBLEM — R33.9 URINARY RETENTION: Status: RESOLVED | Noted: 2023-03-13 | Resolved: 2023-08-07

## 2023-08-07 PROBLEM — H53.8 BLURRY VISION: Status: RESOLVED | Noted: 2020-03-03 | Resolved: 2023-08-07

## 2023-08-07 PROBLEM — F54 PSYCHOLOGICAL FACTORS AFFECTING MEDICAL CONDITION: Status: RESOLVED | Noted: 2020-05-12 | Resolved: 2023-08-07

## 2023-08-07 PROBLEM — R06.02 SOB (SHORTNESS OF BREATH): Status: RESOLVED | Noted: 2020-08-25 | Resolved: 2023-08-07

## 2023-08-07 PROBLEM — N39.0 UTI (URINARY TRACT INFECTION): Status: RESOLVED | Noted: 2023-07-11 | Resolved: 2023-08-07

## 2023-08-07 PROBLEM — I95.1 ORTHOSTATIC HYPOTENSION: Status: RESOLVED | Noted: 2023-03-10 | Resolved: 2023-08-07

## 2023-08-07 PROBLEM — R00.1 BRADYCARDIA: Status: RESOLVED | Noted: 2023-03-10 | Resolved: 2023-08-07

## 2023-08-07 PROBLEM — R47.9 SPEECH DISTURBANCE: Status: RESOLVED | Noted: 2020-03-03 | Resolved: 2023-08-07

## 2023-08-07 PROBLEM — R29.90 NEUROLOGICAL COMPLAINT: Status: RESOLVED | Noted: 2023-03-10 | Resolved: 2023-08-07

## 2023-08-07 PROCEDURE — 1157F ADVNC CARE PLAN IN RCRD: CPT | Mod: HCNC,CPTII,S$GLB, | Performed by: NURSE PRACTITIONER

## 2023-08-07 PROCEDURE — 1160F PR REVIEW ALL MEDS BY PRESCRIBER/CLIN PHARMACIST DOCUMENTED: ICD-10-PCS | Mod: HCNC,CPTII,S$GLB, | Performed by: NURSE PRACTITIONER

## 2023-08-07 PROCEDURE — 3008F BODY MASS INDEX DOCD: CPT | Mod: HCNC,CPTII,S$GLB, | Performed by: NURSE PRACTITIONER

## 2023-08-07 PROCEDURE — 1101F PT FALLS ASSESS-DOCD LE1/YR: CPT | Mod: HCNC,CPTII,S$GLB, | Performed by: NURSE PRACTITIONER

## 2023-08-07 PROCEDURE — 3078F PR MOST RECENT DIASTOLIC BLOOD PRESSURE < 80 MM HG: ICD-10-PCS | Mod: HCNC,CPTII,S$GLB, | Performed by: NURSE PRACTITIONER

## 2023-08-07 PROCEDURE — 1159F MED LIST DOCD IN RCRD: CPT | Mod: HCNC,CPTII,S$GLB, | Performed by: NURSE PRACTITIONER

## 2023-08-07 PROCEDURE — 4010F ACE/ARB THERAPY RXD/TAKEN: CPT | Mod: HCNC,CPTII,S$GLB, | Performed by: NURSE PRACTITIONER

## 2023-08-07 PROCEDURE — 1101F PR PT FALLS ASSESS DOC 0-1 FALLS W/OUT INJ PAST YR: ICD-10-PCS | Mod: HCNC,CPTII,S$GLB, | Performed by: NURSE PRACTITIONER

## 2023-08-07 PROCEDURE — 3074F PR MOST RECENT SYSTOLIC BLOOD PRESSURE < 130 MM HG: ICD-10-PCS | Mod: HCNC,CPTII,S$GLB, | Performed by: NURSE PRACTITIONER

## 2023-08-07 PROCEDURE — 99213 OFFICE O/P EST LOW 20 MIN: CPT | Mod: HCNC,S$GLB,, | Performed by: NURSE PRACTITIONER

## 2023-08-07 PROCEDURE — 3288F FALL RISK ASSESSMENT DOCD: CPT | Mod: HCNC,CPTII,S$GLB, | Performed by: NURSE PRACTITIONER

## 2023-08-07 PROCEDURE — 99999 PR PBB SHADOW E&M-EST. PATIENT-LVL V: ICD-10-PCS | Mod: PBBFAC,HCNC,, | Performed by: NURSE PRACTITIONER

## 2023-08-07 PROCEDURE — 1157F PR ADVANCE CARE PLAN OR EQUIV PRESENT IN MEDICAL RECORD: ICD-10-PCS | Mod: HCNC,CPTII,S$GLB, | Performed by: NURSE PRACTITIONER

## 2023-08-07 PROCEDURE — 3074F SYST BP LT 130 MM HG: CPT | Mod: HCNC,CPTII,S$GLB, | Performed by: NURSE PRACTITIONER

## 2023-08-07 PROCEDURE — 3078F DIAST BP <80 MM HG: CPT | Mod: HCNC,CPTII,S$GLB, | Performed by: NURSE PRACTITIONER

## 2023-08-07 PROCEDURE — 1159F PR MEDICATION LIST DOCUMENTED IN MEDICAL RECORD: ICD-10-PCS | Mod: HCNC,CPTII,S$GLB, | Performed by: NURSE PRACTITIONER

## 2023-08-07 PROCEDURE — 4010F PR ACE/ARB THEARPY RXD/TAKEN: ICD-10-PCS | Mod: HCNC,CPTII,S$GLB, | Performed by: NURSE PRACTITIONER

## 2023-08-07 PROCEDURE — 99213 PR OFFICE/OUTPT VISIT, EST, LEVL III, 20-29 MIN: ICD-10-PCS | Mod: HCNC,S$GLB,, | Performed by: NURSE PRACTITIONER

## 2023-08-07 PROCEDURE — 3008F PR BODY MASS INDEX (BMI) DOCUMENTED: ICD-10-PCS | Mod: HCNC,CPTII,S$GLB, | Performed by: NURSE PRACTITIONER

## 2023-08-07 PROCEDURE — 3288F PR FALLS RISK ASSESSMENT DOCUMENTED: ICD-10-PCS | Mod: HCNC,CPTII,S$GLB, | Performed by: NURSE PRACTITIONER

## 2023-08-07 PROCEDURE — 99999 PR PBB SHADOW E&M-EST. PATIENT-LVL V: CPT | Mod: PBBFAC,HCNC,, | Performed by: NURSE PRACTITIONER

## 2023-08-07 PROCEDURE — 1160F RVW MEDS BY RX/DR IN RCRD: CPT | Mod: HCNC,CPTII,S$GLB, | Performed by: NURSE PRACTITIONER

## 2023-08-07 RX ORDER — CARVEDILOL 3.12 MG/1
3.12 TABLET ORAL 2 TIMES DAILY WITH MEALS
COMMUNITY
End: 2023-10-06 | Stop reason: SDUPTHER

## 2023-08-07 RX ORDER — GABAPENTIN 400 MG/1
400 CAPSULE ORAL 3 TIMES DAILY
COMMUNITY
End: 2023-08-25 | Stop reason: SDUPTHER

## 2023-08-07 NOTE — TELEPHONE ENCOUNTER
----- Message from Kellee Camp sent at 8/7/2023 12:28 PM CDT -----  Regarding: Referral  Good Afternoon, patient seen today request referral for pain management for leg pain. Please call patient at [phone]. Thanks/elr

## 2023-08-08 ENCOUNTER — PATIENT MESSAGE (OUTPATIENT)
Dept: FAMILY MEDICINE | Facility: CLINIC | Age: 73
End: 2023-08-08
Payer: MEDICARE

## 2023-08-08 DIAGNOSIS — R53.1 WEAKNESS: Primary | ICD-10-CM

## 2023-08-10 ENCOUNTER — OUTPATIENT CASE MANAGEMENT (OUTPATIENT)
Dept: ADMINISTRATIVE | Facility: OTHER | Age: 73
End: 2023-08-10
Payer: MEDICARE

## 2023-08-10 ENCOUNTER — PATIENT MESSAGE (OUTPATIENT)
Dept: HEMATOLOGY/ONCOLOGY | Facility: CLINIC | Age: 73
End: 2023-08-10
Payer: MEDICARE

## 2023-08-10 ENCOUNTER — TELEPHONE (OUTPATIENT)
Dept: CARDIOLOGY | Facility: CLINIC | Age: 73
End: 2023-08-10
Payer: MEDICARE

## 2023-08-10 DIAGNOSIS — I50.32 CHRONIC DIASTOLIC CONGESTIVE HEART FAILURE: Primary | ICD-10-CM

## 2023-08-10 RX ORDER — TRAMADOL HYDROCHLORIDE 50 MG/1
TABLET ORAL
Qty: 28 TABLET | Refills: 0 | OUTPATIENT
Start: 2023-08-10

## 2023-08-10 NOTE — TELEPHONE ENCOUNTER
"I returned call to pt. She says she has been having worsening LE edema for "a few days". No worsening sob. Denies any pnd or orthopnea.   Wt on mon 8/7 was 310 lbs, 8/8-306 lbs and wed 8/9  was 309 lbs.   She states she has been taking torsemide 20mg bid  And wearing her compression stockings.     Pt placed on hold   I discussed with provider.   Increase torsemide to 40mg bid x 2 days then resume regular dosage.   Bmp and bnp scheduled at next OV on 8/25.   KENAI//Maria Isabel GHOTRA//JEAN-PAUL Branch LPN      Pt given instructions on torsemide and repeated instructions back correctly.     Labs scheduled.   "

## 2023-08-10 NOTE — TELEPHONE ENCOUNTER
No care due was identified.  [facility] Embedded Care Due Messages. Reference number: [id number].   8/10/2023 2:56:58 PM CDT

## 2023-08-10 NOTE — TELEPHONE ENCOUNTER
Patient stated she was told to take two Tramadol (100 mg) instead of the 50 mg. She says she is out of this medication. She is requesting a refill on her Muscle relaxer also *Methocarbamol 500MG TAB taking 3 times daily/as needed. I don't see this medication on her med list.

## 2023-08-10 NOTE — TELEPHONE ENCOUNTER
Advise pt I don't see Methocarbamol on her med list and will not be able to refill.     Also, I don't see any instructions/recommendations to take Tramadol as she states. I will not be able to honor refill.     Thanks.

## 2023-08-10 NOTE — TELEPHONE ENCOUNTER
----- Message from David Sierra RN sent at 8/10/2023  9:37 AM CDT -----  Regarding: leg swelling  Good morning,     I just spoke to Mrs Luther and she stated that she was having swelling in her b/l legs. She stated the h/h nurse also noticed the swelling. Mrs. Luther states she used to take Lasix but it was d/c. She was wondering if an appointment could be made for evaluation of the swelling and to make sure that she doesn't need lasix added back. Thank you for your time and assistance!      Kind regards,    David Sierra RN OPCM

## 2023-08-10 NOTE — PROGRESS NOTES
Outpatient Care Management  Plan of Care Follow Up Visit    Patient: Susu Luther  MRN: [id number]  Date of Service: 08/10/2023  Completed by: David Sierra RN  Referral Date: 07/12/2023    Reason for Visit   Patient presents with    OPCM RN Follow Up Call       Brief Summary: Phone contact made with Mrs. Luther and we discussed her CHF care plan. She stated that she was in need of a refill on her tramadol and her muscle relaxer. I will reach out to Dr. Edwards staff for this. She also described increased swelling in her legs and was concerned because she felt she needed Lasix added back to her medications. I will reach out to Cardiology NP MsSaray Keagan to address this. Plan to follow up in two weeks.

## 2023-08-16 ENCOUNTER — TELEPHONE (OUTPATIENT)
Dept: ADMINISTRATIVE | Facility: HOSPITAL | Age: 73
End: 2023-08-16
Payer: MEDICARE

## 2023-08-18 ENCOUNTER — PATIENT MESSAGE (OUTPATIENT)
Dept: CARDIOLOGY | Facility: CLINIC | Age: 73
End: 2023-08-18
Payer: MEDICARE

## 2023-08-18 RX ORDER — RANOLAZINE 1000 MG/1
1000 TABLET, EXTENDED RELEASE ORAL 2 TIMES DAILY
Qty: 180 TABLET | Refills: 3 | Status: SHIPPED | OUTPATIENT
Start: 2023-08-18 | End: 2023-10-06 | Stop reason: SDUPTHER

## 2023-08-21 ENCOUNTER — PATIENT MESSAGE (OUTPATIENT)
Dept: PHARMACY | Facility: CLINIC | Age: 73
End: 2023-08-21
Payer: MEDICARE

## 2023-08-21 ENCOUNTER — PATIENT MESSAGE (OUTPATIENT)
Dept: FAMILY MEDICINE | Facility: CLINIC | Age: 73
End: 2023-08-21
Payer: MEDICARE

## 2023-08-23 ENCOUNTER — OFFICE VISIT (OUTPATIENT)
Dept: FAMILY MEDICINE | Facility: CLINIC | Age: 73
End: 2023-08-23
Payer: MEDICARE

## 2023-08-23 VITALS
OXYGEN SATURATION: 97 % | DIASTOLIC BLOOD PRESSURE: 68 MMHG | SYSTOLIC BLOOD PRESSURE: 112 MMHG | TEMPERATURE: 97 F | HEART RATE: 74 BPM | WEIGHT: 293 LBS | BODY MASS INDEX: 39.68 KG/M2 | RESPIRATION RATE: 20 BRPM | HEIGHT: 72 IN

## 2023-08-23 DIAGNOSIS — Z00.00 ENCOUNTER FOR MEDICARE ANNUAL WELLNESS EXAM: ICD-10-CM

## 2023-08-23 DIAGNOSIS — D64.9 ANEMIA, UNSPECIFIED TYPE: ICD-10-CM

## 2023-08-23 DIAGNOSIS — I50.32 CHRONIC DIASTOLIC CONGESTIVE HEART FAILURE: ICD-10-CM

## 2023-08-23 DIAGNOSIS — Z00.00 ENCOUNTER FOR PREVENTIVE HEALTH EXAMINATION: Primary | ICD-10-CM

## 2023-08-23 DIAGNOSIS — C50.411 MALIGNANT NEOPLASM OF UPPER-OUTER QUADRANT OF RIGHT BREAST IN FEMALE, ESTROGEN RECEPTOR POSITIVE: Chronic | ICD-10-CM

## 2023-08-23 DIAGNOSIS — Z17.0 MALIGNANT NEOPLASM OF UPPER-OUTER QUADRANT OF RIGHT BREAST IN FEMALE, ESTROGEN RECEPTOR POSITIVE: Chronic | ICD-10-CM

## 2023-08-23 DIAGNOSIS — M79.2 NEUROPATHIC PAIN: ICD-10-CM

## 2023-08-23 DIAGNOSIS — N18.31 CHRONIC KIDNEY DISEASE, STAGE 3A: ICD-10-CM

## 2023-08-23 DIAGNOSIS — Z86.711 HISTORY OF PULMONARY EMBOLISM: ICD-10-CM

## 2023-08-23 DIAGNOSIS — E66.01 CLASS 3 SEVERE OBESITY WITH SERIOUS COMORBIDITY AND BODY MASS INDEX (BMI) OF 40.0 TO 44.9 IN ADULT, UNSPECIFIED OBESITY TYPE: Chronic | ICD-10-CM

## 2023-08-23 DIAGNOSIS — G47.33 OSA (OBSTRUCTIVE SLEEP APNEA): Chronic | ICD-10-CM

## 2023-08-23 DIAGNOSIS — F33.1 MAJOR DEPRESSIVE DISORDER, RECURRENT EPISODE, MODERATE WITH ANXIOUS DISTRESS: ICD-10-CM

## 2023-08-23 DIAGNOSIS — C50.411 MALIGNANT NEOPLASM OF UPPER-OUTER QUADRANT OF RIGHT BREAST IN FEMALE, ESTROGEN RECEPTOR POSITIVE: Primary | ICD-10-CM

## 2023-08-23 DIAGNOSIS — R29.6 FREQUENT FALLS: ICD-10-CM

## 2023-08-23 DIAGNOSIS — R53.81 PHYSICAL DEBILITY: ICD-10-CM

## 2023-08-23 DIAGNOSIS — Z17.0 MALIGNANT NEOPLASM OF UPPER-OUTER QUADRANT OF RIGHT BREAST IN FEMALE, ESTROGEN RECEPTOR POSITIVE: Primary | ICD-10-CM

## 2023-08-23 DIAGNOSIS — S80.11XA LEG HEMATOMA, RIGHT, INITIAL ENCOUNTER: ICD-10-CM

## 2023-08-23 DIAGNOSIS — N39.46 MIXED INCONTINENCE: ICD-10-CM

## 2023-08-23 DIAGNOSIS — I10 HYPERTENSION, UNSPECIFIED TYPE: ICD-10-CM

## 2023-08-23 DIAGNOSIS — I72.8 SPLENIC ARTERY ANEURYSM: ICD-10-CM

## 2023-08-23 DIAGNOSIS — I50.32 CHRONIC DIASTOLIC CONGESTIVE HEART FAILURE: Primary | ICD-10-CM

## 2023-08-23 DIAGNOSIS — R59.1 LYMPHADENOPATHY: ICD-10-CM

## 2023-08-23 PROCEDURE — 99999 PR PBB SHADOW E&M-EST. PATIENT-LVL V: CPT | Mod: PBBFAC,HCNC,, | Performed by: NURSE PRACTITIONER

## 2023-08-23 PROCEDURE — 1125F PR PAIN SEVERITY QUANTIFIED, PAIN PRESENT: ICD-10-PCS | Mod: HCNC,CPTII,S$GLB, | Performed by: NURSE PRACTITIONER

## 2023-08-23 PROCEDURE — 1157F ADVNC CARE PLAN IN RCRD: CPT | Mod: HCNC,CPTII,S$GLB, | Performed by: NURSE PRACTITIONER

## 2023-08-23 PROCEDURE — 4010F PR ACE/ARB THEARPY RXD/TAKEN: ICD-10-PCS | Mod: HCNC,CPTII,S$GLB, | Performed by: NURSE PRACTITIONER

## 2023-08-23 PROCEDURE — 3288F PR FALLS RISK ASSESSMENT DOCUMENTED: ICD-10-PCS | Mod: HCNC,CPTII,S$GLB, | Performed by: NURSE PRACTITIONER

## 2023-08-23 PROCEDURE — G0439 PR MEDICARE ANNUAL WELLNESS SUBSEQUENT VISIT: ICD-10-PCS | Mod: HCNC,S$GLB,, | Performed by: NURSE PRACTITIONER

## 2023-08-23 PROCEDURE — 4010F ACE/ARB THERAPY RXD/TAKEN: CPT | Mod: HCNC,CPTII,S$GLB, | Performed by: NURSE PRACTITIONER

## 2023-08-23 PROCEDURE — 3288F FALL RISK ASSESSMENT DOCD: CPT | Mod: HCNC,CPTII,S$GLB, | Performed by: NURSE PRACTITIONER

## 2023-08-23 PROCEDURE — 1100F PTFALLS ASSESS-DOCD GE2>/YR: CPT | Mod: HCNC,CPTII,S$GLB, | Performed by: NURSE PRACTITIONER

## 2023-08-23 PROCEDURE — 1157F PR ADVANCE CARE PLAN OR EQUIV PRESENT IN MEDICAL RECORD: ICD-10-PCS | Mod: HCNC,CPTII,S$GLB, | Performed by: NURSE PRACTITIONER

## 2023-08-23 PROCEDURE — 3074F PR MOST RECENT SYSTOLIC BLOOD PRESSURE < 130 MM HG: ICD-10-PCS | Mod: HCNC,CPTII,S$GLB, | Performed by: NURSE PRACTITIONER

## 2023-08-23 PROCEDURE — 99999 PR PBB SHADOW E&M-EST. PATIENT-LVL V: ICD-10-PCS | Mod: PBBFAC,HCNC,, | Performed by: NURSE PRACTITIONER

## 2023-08-23 PROCEDURE — 3078F DIAST BP <80 MM HG: CPT | Mod: HCNC,CPTII,S$GLB, | Performed by: NURSE PRACTITIONER

## 2023-08-23 PROCEDURE — 3008F BODY MASS INDEX DOCD: CPT | Mod: HCNC,CPTII,S$GLB, | Performed by: NURSE PRACTITIONER

## 2023-08-23 PROCEDURE — 3078F PR MOST RECENT DIASTOLIC BLOOD PRESSURE < 80 MM HG: ICD-10-PCS | Mod: HCNC,CPTII,S$GLB, | Performed by: NURSE PRACTITIONER

## 2023-08-23 PROCEDURE — 3008F PR BODY MASS INDEX (BMI) DOCUMENTED: ICD-10-PCS | Mod: HCNC,CPTII,S$GLB, | Performed by: NURSE PRACTITIONER

## 2023-08-23 PROCEDURE — 3074F SYST BP LT 130 MM HG: CPT | Mod: HCNC,CPTII,S$GLB, | Performed by: NURSE PRACTITIONER

## 2023-08-23 PROCEDURE — 1100F PR PT FALLS ASSESS DOC 2+ FALLS/FALL W/INJURY/YR: ICD-10-PCS | Mod: HCNC,CPTII,S$GLB, | Performed by: NURSE PRACTITIONER

## 2023-08-23 PROCEDURE — 1125F AMNT PAIN NOTED PAIN PRSNT: CPT | Mod: HCNC,CPTII,S$GLB, | Performed by: NURSE PRACTITIONER

## 2023-08-23 PROCEDURE — G0439 PPPS, SUBSEQ VISIT: HCPCS | Mod: HCNC,S$GLB,, | Performed by: NURSE PRACTITIONER

## 2023-08-23 NOTE — PATIENT INSTRUCTIONS
Counseling and Referral of Other Preventative  (Italic type indicates deductible and co-insurance are waived)    Patient Name: Susu Luther  Today's Date: 8/23/2023    Health Maintenance       Date Due Completion Date    Shingles Vaccine (1 of 2) Never done ---    Colorectal Cancer Screening Never done ---    TETANUS VACCINE 07/03/2024 (Originally 1/2/1968) ---    COVID-19 Vaccine (7 - Moderna series) 07/03/2024 (Originally 3/7/2023) 11/7/2022    Influenza Vaccine (1) 09/01/2023 10/11/2022    Lipid Panel 02/28/2024 2/28/2023    Mammogram 05/22/2024 5/22/2023    DEXA Scan 07/28/2026 7/28/2023        No orders of the defined types were placed in this encounter.    The following information is provided to all patients.  This information is to help you find resources for any of the problems found today that may be affecting your health:                Living healthy guide: www.[facility].[place].gov      Understanding Diabetes: www.diabetes.org      Eating healthy: www.cdc.gov/healthyweight      CDC home safety checklist: www.cdc.gov/steadi/patient.html      Agency on Aging: www.goea.[place].gov      Alcoholics anonymous (AA): www.aa.org      Physical Activity: www.romulo.nih.gov/wt4srij      Tobacco use: www.quitwithusla.org

## 2023-08-23 NOTE — PROGRESS NOTES
"  Susu Luther presented for a  Medicare AWV and comprehensive Health Risk Assessment today. The following components were reviewed and updated:    Medical history  Family History  Social history  Allergies and Current Medications  Health Risk Assessment  Health Maintenance  Care Team         ** See Completed Assessments for Annual Wellness Visit within the encounter summary.**         The following assessments were completed:  Living Situation  CAGE  Depression Screening  Timed Get Up and Go  Whisper Test  Cognitive Function Screening  Nutrition Screening  ADL Screening  PAQ Screening        Vitals:    23 1320   BP: 112/68   Pulse: 74   Resp: (!) 22   Temp: 97 °F (36.1 °C)   Weight: (!) 144.2 kg (317 lb 14.5 oz)   Height: 6' 1" (1.854 m)     Body mass index is 41.94 kg/m².  Physical Exam  Vitals and nursing note reviewed.   Constitutional:       Appearance: Normal appearance. She is well-developed. She is obese.   HENT:      Head: Normocephalic and atraumatic.   Eyes:      Pupils: Pupils are equal, round, and reactive to light.   Neck:      Vascular: No carotid bruit.   Cardiovascular:      Rate and Rhythm: Normal rate and regular rhythm.      Pulses: Normal pulses.      Heart sounds: Murmur heard.      No gallop.   Pulmonary:      Effort: Pulmonary effort is normal.      Breath sounds: Normal breath sounds.   Abdominal:      General: Bowel sounds are normal. There is no distension.      Palpations: Abdomen is soft.      Tenderness: There is no abdominal tenderness.   Musculoskeletal:         General: No tenderness. Normal range of motion.      Right lower le+ Edema present.      Left lower le+ Edema present.   Skin:     General: Skin is warm and dry.      Findings: Bruising present.      Comments: Rt lateral leg bruise -see pics   Neurological:      Mental Status: She is alert.      Motor: No abnormal muscle tone.      Gait: Gait abnormal.   Psychiatric:         Mood and Affect: Mood is " depressed.         Speech: Speech normal.         Behavior: Behavior normal.         Thought Content: Thought content normal.         Judgment: Judgment normal.     Current Outpatient Medications   Medication Instructions    ALPRAZolam (XANAX) 0.5 MG tablet Take 1 tablet by mouth twice daily as needed    ALPRAZolam (XANAX) 0.5 mg, Oral, 2 times daily PRN    amLODIPine (NORVASC) 10 mg, Oral, Daily    apixaban (ELIQUIS) 5 mg, Oral, 2 times daily    atorvastatin (LIPITOR) 20 mg, Oral, Daily    buPROPion (WELLBUTRIN XL) 150 mg, Oral, Daily    butalbital-acetaminophen-caffeine -40 mg (FIORICET, ESGIC) -40 mg per tablet TAKE 1 TABLET DAILY AS NEEDED FOR PAIN OR HEADACHES.    butalbital-acetaminophen-caffeine -40 mg (FIORICET, ESGIC) -40 mg per tablet TAKE 1 TABLET DAILY AS NEEDED FOR PAIN OR HEADACHES.    butalbital-acetaminophen-caffeine -40 mg (FIORICET, ESGIC) -40 mg per tablet Take 1 tablet by mouth daily as needed for headache    carvediloL (COREG) 3.125 mg, Oral, 2 times daily with meals    cholecalciferol (vitamin D3) 50,000 Units, Oral, Weekly    cloNIDine (CATAPRES) 0.2 mg, Oral, 2 times daily PRN    diclofenac sodium (VOLTAREN) 1 % Gel Apply 2 grams topically once daily.    gabapentin (NEURONTIN) 400 mg, Oral, 3 times daily    hydrALAZINE (APRESOLINE) 25 mg, Oral, 3 times daily    letrozole (FEMARA) 2.5 mg, Oral, Daily    levocetirizine (XYZAL) 5 mg, Oral, Nightly    LIDOcaine (LIDODERM) 5 % 2 patches, Transdermal, Daily, Remove & Discard patch within 12 hours or as directed by MD    multivitamin (THERAGRAN) per tablet 1 tablet, Oral, Daily    oxyCODONE-acetaminophen (PERCOCET) 7.5-325 mg per tablet 1 tablet, Oral, Every 6 hours PRN    ranolazine (RANEXA) 1,000 mg, Oral, 2 times daily    sacubitriL-valsartan (ENTRESTO) 24-26 mg per tablet 1 tablet, Oral, 2 times daily    tamsulosin (FLOMAX) 0.4 mg, Oral, Daily    torsemide (DEMADEX) 20 mg, Oral, Daily    traMADoL (ULTRAM) 50  mg tablet Take 1 tablet by mouth every 6 hours as needed    zolpidem (AMBIEN) 5 mg, Oral, Nightly PRN               Diagnoses and health risks identified today and associated recommendations/orders:    1. Encounter for Medicare annual wellness exam   Ambulatory Referral/Consult to Enhanced Annual Wellness Visit (eAWV)    2. Encounter for preventive health examination  Review for Opioid Screening: Patient does not have rx for Opioids.    States she given Tramadol/ Percocet in the past  but is not on them now  Review for Substance Use Disorders: Patient does not use substance.    Instructed to collect color guard    3. Malignant neoplasm of upper-outer quadrant of right breast in female, estrogen receptor positive  Chronic/ Monitored/Stable on Femera daily  Hx of left breast lumpectomy 2019- node dissection 2020  Followed by oncologist     Scheduled for dx mammogram 11/2023  Followed by oncologist every 6 months    4. Chronic diastolic congestive heart failure  Chronic/ Monitored/Stable on Torsemide and Coreg  as directed.  Followed by card appt 8/25/23    5. Lymphadenopathy  Chronic and Ongoing.followed bu oncologist    6. Major depressive disorder, recurrent episode, moderate with anxious distress  Chronic and Ongoing on Wellbutrin-- PHQ2 score 4   seen psych in the past now followed PCP   Follow up with PCP    7. Splenic artery aneurysm   Hx Rim calcified splenic artery aneurysm, 3.7 cm. S/P repair. Follow up with card    8. Chronic kidney disease, stage 3a  Chronic/ Monitored/Stable on  as directed.  Followed by PCP    9. Hypertension, unspecified type  Chronic/ Monitored/Stable -see meds  Followed by card md     10. Class 3 severe obesity with serious comorbidity and body mass index (BMI) of 40.0 to 44.9 in adult, unspecified obesity type  Wt: 144.2 kg (317 lb 14.5 oz)  Discussed and recommend  low fat/carb/chol diet. Cardio exercise as tolerated. Life style modifications.    11. NILS (obstructive sleep  apnea)  This problem is currently not controlled   Pt didn't follow after recall machine  Decline appt at this time     12. Physical debility  Chronic and Ongoing. Pt had fall last month ,went to in pt rehab. Now with home PT- using a walker    13. History of pulmonary embolism  Chronic/ Monitored/Stable on  Elquis as directed.  Followed by card    14. Neuropathic pain  Chronic and Ongoing on Gabpentin- states not working  Schedule to see pain management    15. Anemia, unspecified type  Chronic/ Monitored/Stable on blood counts  as directed.  Followed by mague hinojosa     16. Leg hematoma, rt, initial encounter  States left leg bruise -notice over the last few days  Denies injury  Tender touch  Denies acute bleeding  Image took f/u with card 8/25/ 23      Will discuss output case management  with primary care due to chronic health problems    17.Mixed incontinence  Chronic and Ongoing.  Restarted on FLOMAX in rehab  Scheduled appt with urologist to discuss the need to continue med    Provided Susu with a 5-10 year written screening schedule and personal prevention plan. Recommendations were developed using the USPSTF age appropriate recommendations. Education, counseling, and referrals were provided as needed. After Visit Summary printed and given to patient which includes a list of additional screenings\tests needed.     1 year annual   Tessie Payan NP

## 2023-08-24 DIAGNOSIS — C50.411 MALIGNANT NEOPLASM OF UPPER-OUTER QUADRANT OF RIGHT BREAST IN FEMALE, ESTROGEN RECEPTOR POSITIVE: Primary | ICD-10-CM

## 2023-08-24 DIAGNOSIS — Z17.0 MALIGNANT NEOPLASM OF UPPER-OUTER QUADRANT OF RIGHT BREAST IN FEMALE, ESTROGEN RECEPTOR POSITIVE: Primary | ICD-10-CM

## 2023-08-25 ENCOUNTER — HOSPITAL ENCOUNTER (OUTPATIENT)
Dept: CARDIOLOGY | Facility: HOSPITAL | Age: 73
Discharge: HOME | End: 2023-08-25
Attending: PHYSICIAN ASSISTANT
Payer: MEDICARE

## 2023-08-25 ENCOUNTER — OFFICE VISIT (OUTPATIENT)
Dept: CARDIOLOGY | Facility: CLINIC | Age: 73
End: 2023-08-25
Payer: MEDICARE

## 2023-08-25 VITALS
WEIGHT: 293 LBS | HEART RATE: 70 BPM | SYSTOLIC BLOOD PRESSURE: 100 MMHG | BODY MASS INDEX: 41.62 KG/M2 | OXYGEN SATURATION: 99 % | DIASTOLIC BLOOD PRESSURE: 60 MMHG

## 2023-08-25 VITALS — HEIGHT: 72 IN | BODY MASS INDEX: 39.68 KG/M2 | WEIGHT: 293 LBS

## 2023-08-25 DIAGNOSIS — I82.461 ACUTE DEEP VEIN THROMBOSIS (DVT) OF CALF MUSCLE VEIN OF RIGHT LOWER EXTREMITY: ICD-10-CM

## 2023-08-25 DIAGNOSIS — I50.32 CHRONIC DIASTOLIC CONGESTIVE HEART FAILURE: Primary | ICD-10-CM

## 2023-08-25 PROCEDURE — 4010F PR ACE/ARB THEARPY RXD/TAKEN: ICD-10-PCS | Mod: HCNC,CPTII,S$GLB, | Performed by: PHYSICIAN ASSISTANT

## 2023-08-25 PROCEDURE — 3008F BODY MASS INDEX DOCD: CPT | Mod: HCNC,CPTII,S$GLB, | Performed by: PHYSICIAN ASSISTANT

## 2023-08-25 PROCEDURE — 99214 PR OFFICE/OUTPT VISIT, EST, LEVL IV, 30-39 MIN: ICD-10-PCS | Mod: HCNC,S$GLB,, | Performed by: PHYSICIAN ASSISTANT

## 2023-08-25 PROCEDURE — 93971 EXTREMITY STUDY: CPT | Mod: HCNC,RT

## 2023-08-25 PROCEDURE — 1160F RVW MEDS BY RX/DR IN RCRD: CPT | Mod: HCNC,CPTII,S$GLB, | Performed by: PHYSICIAN ASSISTANT

## 2023-08-25 PROCEDURE — 99999 PR PBB SHADOW E&M-EST. PATIENT-LVL IV: ICD-10-PCS | Mod: PBBFAC,HCNC,, | Performed by: PHYSICIAN ASSISTANT

## 2023-08-25 PROCEDURE — 3078F PR MOST RECENT DIASTOLIC BLOOD PRESSURE < 80 MM HG: ICD-10-PCS | Mod: HCNC,CPTII,S$GLB, | Performed by: PHYSICIAN ASSISTANT

## 2023-08-25 PROCEDURE — 1159F MED LIST DOCD IN RCRD: CPT | Mod: HCNC,CPTII,S$GLB, | Performed by: PHYSICIAN ASSISTANT

## 2023-08-25 PROCEDURE — 1126F PR PAIN SEVERITY QUANTIFIED, NO PAIN PRESENT: ICD-10-PCS | Mod: HCNC,CPTII,S$GLB, | Performed by: PHYSICIAN ASSISTANT

## 2023-08-25 PROCEDURE — 3074F PR MOST RECENT SYSTOLIC BLOOD PRESSURE < 130 MM HG: ICD-10-PCS | Mod: HCNC,CPTII,S$GLB, | Performed by: PHYSICIAN ASSISTANT

## 2023-08-25 PROCEDURE — 1159F PR MEDICATION LIST DOCUMENTED IN MEDICAL RECORD: ICD-10-PCS | Mod: HCNC,CPTII,S$GLB, | Performed by: PHYSICIAN ASSISTANT

## 2023-08-25 PROCEDURE — 99999 PR PBB SHADOW E&M-EST. PATIENT-LVL IV: CPT | Mod: PBBFAC,HCNC,, | Performed by: PHYSICIAN ASSISTANT

## 2023-08-25 PROCEDURE — 4010F ACE/ARB THERAPY RXD/TAKEN: CPT | Mod: HCNC,CPTII,S$GLB, | Performed by: PHYSICIAN ASSISTANT

## 2023-08-25 PROCEDURE — 3078F DIAST BP <80 MM HG: CPT | Mod: HCNC,CPTII,S$GLB, | Performed by: PHYSICIAN ASSISTANT

## 2023-08-25 PROCEDURE — 1160F PR REVIEW ALL MEDS BY PRESCRIBER/CLIN PHARMACIST DOCUMENTED: ICD-10-PCS | Mod: HCNC,CPTII,S$GLB, | Performed by: PHYSICIAN ASSISTANT

## 2023-08-25 PROCEDURE — 3074F SYST BP LT 130 MM HG: CPT | Mod: HCNC,CPTII,S$GLB, | Performed by: PHYSICIAN ASSISTANT

## 2023-08-25 PROCEDURE — 93971 EXTREMITY STUDY: CPT | Mod: 26,HCNC,RT, | Performed by: INTERNAL MEDICINE

## 2023-08-25 PROCEDURE — 93971 CV US DOPPLER VENOUS LEG RIGHT (CUPID ONLY): ICD-10-PCS | Mod: 26,HCNC,RT, | Performed by: INTERNAL MEDICINE

## 2023-08-25 PROCEDURE — 1157F PR ADVANCE CARE PLAN OR EQUIV PRESENT IN MEDICAL RECORD: ICD-10-PCS | Mod: HCNC,CPTII,S$GLB, | Performed by: PHYSICIAN ASSISTANT

## 2023-08-25 PROCEDURE — 1126F AMNT PAIN NOTED NONE PRSNT: CPT | Mod: HCNC,CPTII,S$GLB, | Performed by: PHYSICIAN ASSISTANT

## 2023-08-25 PROCEDURE — 1157F ADVNC CARE PLAN IN RCRD: CPT | Mod: HCNC,CPTII,S$GLB, | Performed by: PHYSICIAN ASSISTANT

## 2023-08-25 PROCEDURE — 3008F PR BODY MASS INDEX (BMI) DOCUMENTED: ICD-10-PCS | Mod: HCNC,CPTII,S$GLB, | Performed by: PHYSICIAN ASSISTANT

## 2023-08-25 PROCEDURE — 99214 OFFICE O/P EST MOD 30 MIN: CPT | Mod: HCNC,S$GLB,, | Performed by: PHYSICIAN ASSISTANT

## 2023-08-25 RX ORDER — GABAPENTIN 400 MG/1
400 CAPSULE ORAL 3 TIMES DAILY
Qty: 120 CAPSULE | Refills: 0 | Status: SHIPPED | OUTPATIENT
Start: 2023-08-25 | End: 2023-10-06 | Stop reason: SDUPTHER

## 2023-08-25 NOTE — PROGRESS NOTES
[facility] Provider Note (Follow-up) Consult Note      HPI:  Patient can walk around house, limited by leg pain   Patient sleeps on 2 pillows   Patient wakes up SOB, has to get out of bed, associated cough, sputum none   Palpitations - none   Dizzy, light-headed, pre-syncope or syncope none   Since discharge frequency of performing weights, home weight and weight change 317 pounds    Other information felt pertinent to HPI    Here for one month f/u after initial hosp f/u. After last visit called last week with SOB, torsedmie inc to 40 BID for 2 days. Today main issue is bruising to right lower leg. Also has some pain. Started one week ago and does not recall hitting anything. HAd US last year to legs with no DVT.     Not really SOB, last echo with no diastolic dysfunction and IVC normal at 3. Normal LVEF    No PND     PHYSICAL: There were no vitals filed for this visit.   Wt Readings from Last 3 Encounters:   08/23/23 (!) 144.2 kg (317 lb 14.5 oz)   08/07/23 (!) 141.9 kg (312 lb 13.3 oz)   08/03/23 (!) 138.2 kg (304 lb 10.8 oz)       JVD: no,    Heart rhythm: regular  Cardiac murmur: No    S3: no  S4: no  Lungs: clear  Liver span: 10 cm:   Hepatojugular reflux: no  Edema: no,       ASSESSMENT: chronic diastolic HF    PLAN:      Patient Instructions:   Instruct the patient to notify this clinic if HH, a physician or an advanced care provider wants to change medication one of their HF medications   Activity and Diet restrictions:   Recommend 2-3 gram sodium restriction and 1500cc- 2000cc fluid restriction.  Encourage physical activity with graded exercise program.  Requested patient to weigh themselves daily, and to notify us if their weight increases by more than 3 lbs in 1 day or 5 lbs in 3 days.    Assigned dry weight on home scale: 138 kg  Medication changes (include current dose and changed dose)  Will schedule US right leg rule out DVT, already on eliquis  Continue torsemide BID, I would not recommend increasing  further or adding metolazone at this point with normal echo and rising Cr  Labs today  RTC in 1 month to see Dr Hobbs

## 2023-08-28 ENCOUNTER — TELEPHONE (OUTPATIENT)
Dept: FAMILY MEDICINE | Facility: CLINIC | Age: 73
End: 2023-08-28
Payer: MEDICARE

## 2023-08-29 ENCOUNTER — PATIENT MESSAGE (OUTPATIENT)
Dept: FAMILY MEDICINE | Facility: CLINIC | Age: 73
End: 2023-08-29
Payer: MEDICARE

## 2023-08-31 ENCOUNTER — PATIENT MESSAGE (OUTPATIENT)
Dept: FAMILY MEDICINE | Facility: CLINIC | Age: 73
End: 2023-08-31
Payer: MEDICARE

## 2023-08-31 ENCOUNTER — PATIENT MESSAGE (OUTPATIENT)
Dept: CARDIOLOGY | Facility: CLINIC | Age: 73
End: 2023-08-31
Payer: MEDICARE

## 2023-08-31 ENCOUNTER — OFFICE VISIT (OUTPATIENT)
Dept: UROLOGY | Facility: CLINIC | Age: 73
End: 2023-08-31
Payer: MEDICARE

## 2023-08-31 VITALS — DIASTOLIC BLOOD PRESSURE: 58 MMHG | SYSTOLIC BLOOD PRESSURE: 133 MMHG

## 2023-08-31 DIAGNOSIS — N39.46 MIXED INCONTINENCE: Primary | ICD-10-CM

## 2023-08-31 PROBLEM — R53.1 RIGHT SIDED WEAKNESS: Status: RESOLVED | Noted: 2022-06-13 | Resolved: 2023-08-31

## 2023-08-31 PROBLEM — R29.6 FREQUENT FALLS: Status: RESOLVED | Noted: 2023-07-11 | Resolved: 2023-08-31

## 2023-08-31 PROBLEM — R53.1 WEAKNESS: Status: RESOLVED | Noted: 2023-07-13 | Resolved: 2023-08-31

## 2023-08-31 PROBLEM — M25.511 RIGHT SHOULDER PAIN: Status: RESOLVED | Noted: 2023-07-11 | Resolved: 2023-08-31

## 2023-08-31 PROCEDURE — 4010F ACE/ARB THERAPY RXD/TAKEN: CPT | Mod: HCNC,CPTII,S$GLB, | Performed by: NURSE PRACTITIONER

## 2023-08-31 PROCEDURE — 99214 PR OFFICE/OUTPT VISIT, EST, LEVL IV, 30-39 MIN: ICD-10-PCS | Mod: HCNC,S$GLB,, | Performed by: NURSE PRACTITIONER

## 2023-08-31 PROCEDURE — 4010F PR ACE/ARB THEARPY RXD/TAKEN: ICD-10-PCS | Mod: HCNC,CPTII,S$GLB, | Performed by: NURSE PRACTITIONER

## 2023-08-31 PROCEDURE — 1101F PR PT FALLS ASSESS DOC 0-1 FALLS W/OUT INJ PAST YR: ICD-10-PCS | Mod: HCNC,CPTII,S$GLB, | Performed by: NURSE PRACTITIONER

## 2023-08-31 PROCEDURE — 1159F MED LIST DOCD IN RCRD: CPT | Mod: HCNC,CPTII,S$GLB, | Performed by: NURSE PRACTITIONER

## 2023-08-31 PROCEDURE — 99999 PR PBB SHADOW E&M-EST. PATIENT-LVL IV: ICD-10-PCS | Mod: PBBFAC,HCNC,, | Performed by: NURSE PRACTITIONER

## 2023-08-31 PROCEDURE — 99999 PR PBB SHADOW E&M-EST. PATIENT-LVL IV: CPT | Mod: PBBFAC,HCNC,, | Performed by: NURSE PRACTITIONER

## 2023-08-31 PROCEDURE — 1157F ADVNC CARE PLAN IN RCRD: CPT | Mod: HCNC,CPTII,S$GLB, | Performed by: NURSE PRACTITIONER

## 2023-08-31 PROCEDURE — 3075F SYST BP GE 130 - 139MM HG: CPT | Mod: HCNC,CPTII,S$GLB, | Performed by: NURSE PRACTITIONER

## 2023-08-31 PROCEDURE — 3078F DIAST BP <80 MM HG: CPT | Mod: HCNC,CPTII,S$GLB, | Performed by: NURSE PRACTITIONER

## 2023-08-31 PROCEDURE — 3078F PR MOST RECENT DIASTOLIC BLOOD PRESSURE < 80 MM HG: ICD-10-PCS | Mod: HCNC,CPTII,S$GLB, | Performed by: NURSE PRACTITIONER

## 2023-08-31 PROCEDURE — 3075F PR MOST RECENT SYSTOLIC BLOOD PRESS GE 130-139MM HG: ICD-10-PCS | Mod: HCNC,CPTII,S$GLB, | Performed by: NURSE PRACTITIONER

## 2023-08-31 PROCEDURE — 1157F PR ADVANCE CARE PLAN OR EQUIV PRESENT IN MEDICAL RECORD: ICD-10-PCS | Mod: HCNC,CPTII,S$GLB, | Performed by: NURSE PRACTITIONER

## 2023-08-31 PROCEDURE — 1159F PR MEDICATION LIST DOCUMENTED IN MEDICAL RECORD: ICD-10-PCS | Mod: HCNC,CPTII,S$GLB, | Performed by: NURSE PRACTITIONER

## 2023-08-31 PROCEDURE — 3288F PR FALLS RISK ASSESSMENT DOCUMENTED: ICD-10-PCS | Mod: HCNC,CPTII,S$GLB, | Performed by: NURSE PRACTITIONER

## 2023-08-31 PROCEDURE — 99214 OFFICE O/P EST MOD 30 MIN: CPT | Mod: HCNC,S$GLB,, | Performed by: NURSE PRACTITIONER

## 2023-08-31 PROCEDURE — 1125F PR PAIN SEVERITY QUANTIFIED, PAIN PRESENT: ICD-10-PCS | Mod: HCNC,CPTII,S$GLB, | Performed by: NURSE PRACTITIONER

## 2023-08-31 PROCEDURE — 3288F FALL RISK ASSESSMENT DOCD: CPT | Mod: HCNC,CPTII,S$GLB, | Performed by: NURSE PRACTITIONER

## 2023-08-31 PROCEDURE — 1125F AMNT PAIN NOTED PAIN PRSNT: CPT | Mod: HCNC,CPTII,S$GLB, | Performed by: NURSE PRACTITIONER

## 2023-08-31 PROCEDURE — 1101F PT FALLS ASSESS-DOCD LE1/YR: CPT | Mod: HCNC,CPTII,S$GLB, | Performed by: NURSE PRACTITIONER

## 2023-08-31 NOTE — PROGRESS NOTES
Chief Complaint:   Incontinence, urge greater than stress    HPI:   Patient was seen last month for indwelling Puckett.  Patient states that she had severe bradycardia and hospital was concern for falls, therefore Puckett catheter was placed she did not have to get up to urinate.  Patient states she does have a history of urge incontinence, but has greatly improved with decrease caffeine increase water intake.  Does not desire treatment options at this time.  Urine in clinic is negative and PVR is 4 mL.  07/17/2023  Patient is a 73-year-old female that was recently hospitalized secondary to bradycardia.  Patient had multiple falls and staff was concerned that her incontinence was going to increase her fall risk.  Puckett catheter was placed why patient was in the hospital and she was discharged with catheter.  Patient does not have a history of urinary retention.  Does have a history of overactive bladder.  Has completed Cipro secondary to positive urine culture indicating E coli.  Denies gross hematuria.  Patient is asymptomatic related to possible urinary tract infection.  Allergies:  Pcn [penicillins]    Medications:  has a current medication list which includes the following prescription(s): alprazolam, alprazolam, amlodipine, apixaban, atorvastatin, bupropion, butalbital-acetaminophen-caffeine -40 mg, butalbital-acetaminophen-caffeine -40 mg, butalbital-acetaminophen-caffeine -40 mg, carvedilol, cholecalciferol (vitamin d3), clonidine, diclofenac sodium, gabapentin, hydralazine, letrozole, levocetirizine, lidocaine, multivitamin, oxycodone-acetaminophen, ranolazine, sacubitril-valsartan, tamsulosin, torsemide, tramadol, and zolpidem.    Review of Systems:  General: No fever, chills, fatigability, or weight loss.  Skin: No rashes, itching, or changes in color or texture of skin.  Chest: Denies CURIEL, cyanosis, wheezing, cough, and sputum production.  Bilateral lower extremity pitting edema +2  Abdomen:  Appetite fine. No weight loss. Denies diarrhea, abdominal pain, hematemesis, or blood in stool.  Musculoskeletal: No joint stiffness or swelling. Denies back pain.  : As above.  All other review of systems negative.    PMH:   has a past medical history of Cataract, Chronic diastolic congestive heart failure (2020), Dizziness (2023), Encounter for blood transfusion, History of repair of aneurysm of abdominal aorta using endovascular stent graft, psychiatric care, Hypertension, Major depressive disorder, single episode, moderate with anxious distress (2020), Malignant neoplasm of upper-outer quadrant of right breast in female, estrogen receptor positive (2019), Psychiatric problem, Sleep apnea, Sleep difficulties, Stroke (), and Therapy.    PSH:   has a past surgical history that includes Oophorectomy;  section; Appendectomy; [place] lymph node biopsy (Right, 2019); Breast lumpectomy (Right); Breast biopsy; Axillary node dissection (Right, 2020); Biopsy of axillary node (Right, 2021); Tonsillectomy; and splenic artery aneurysm repair.    FamHx: family history includes Diabetes in her maternal grandfather, maternal grandmother, and mother; Hypertension in her mother; Sickle cell anemia in her daughter.    SocHx:  reports that she has never smoked. She has been exposed to tobacco smoke. She has never used smokeless tobacco. She reports that she does not drink alcohol and does not use drugs.      Physical Exam:  General:  Morbidly obese female, A&Ox3, no apparent distress, no deformities  Neck: No masses, normal thyroid  Lungs: normal inspiration, no use of accessory muscles  Heart: normal pulse, no arrhythmias  Abdomen: Soft, NT, ND, no masses, no hernias, no hepatosplenomegaly  Lymphatic: Neck and groin nodes negative    Labs/Studies:   See HPI  Impression/Plan:   Incontinence, urge greater than stress  Patient does not want to consider treatment options at this  time, urine in clinic is negative and PVR is low.  Patient to maintain behavior modifications needed to decrease overactive bladder symptoms and can follow up with our clinic p.r.n..

## 2023-09-01 ENCOUNTER — PATIENT MESSAGE (OUTPATIENT)
Dept: FAMILY MEDICINE | Facility: CLINIC | Age: 73
End: 2023-09-01
Payer: MEDICARE

## 2023-09-01 ENCOUNTER — TELEPHONE (OUTPATIENT)
Dept: FAMILY MEDICINE | Facility: CLINIC | Age: 73
End: 2023-09-01
Payer: MEDICARE

## 2023-09-01 NOTE — TELEPHONE ENCOUNTER
I just Dr Jain office she I have come to Dr Hardin for swelling in my legs Im doing everything that Nel told me to move. The swelling is still not going down

## 2023-09-01 NOTE — TELEPHONE ENCOUNTER
Can I have ibuprofen? I need something for pain my legs is killing me. I know its late you wont get this until in the morning but if you can write the prescription please send it to [facility] on [address] please thank you so much again Michael miner for sending this message so late

## 2023-09-08 ENCOUNTER — PATIENT MESSAGE (OUTPATIENT)
Dept: CARDIOLOGY | Facility: CLINIC | Age: 73
End: 2023-09-08
Payer: MEDICARE

## 2023-09-08 ENCOUNTER — PATIENT MESSAGE (OUTPATIENT)
Dept: FAMILY MEDICINE | Facility: CLINIC | Age: 73
End: 2023-09-08
Payer: MEDICARE

## 2023-09-08 DIAGNOSIS — R79.89 LOW VITAMIN D LEVEL: ICD-10-CM

## 2023-09-08 DIAGNOSIS — R51.9 CHRONIC NONINTRACTABLE HEADACHE, UNSPECIFIED HEADACHE TYPE: ICD-10-CM

## 2023-09-08 DIAGNOSIS — R51.9 NONINTRACTABLE HEADACHE, UNSPECIFIED CHRONICITY PATTERN, UNSPECIFIED HEADACHE TYPE: ICD-10-CM

## 2023-09-08 DIAGNOSIS — G89.29 CHRONIC NONINTRACTABLE HEADACHE, UNSPECIFIED HEADACHE TYPE: ICD-10-CM

## 2023-09-08 DIAGNOSIS — M79.2 NEUROPATHIC PAIN: Primary | ICD-10-CM

## 2023-09-08 RX ORDER — ASPIRIN 325 MG
50000 TABLET, DELAYED RELEASE (ENTERIC COATED) ORAL WEEKLY
Qty: 12 CAPSULE | Refills: 0 | Status: SHIPPED | OUTPATIENT
Start: 2023-09-08 | End: 2023-10-14 | Stop reason: SDUPTHER

## 2023-09-08 RX ORDER — BUTALBITAL, ACETAMINOPHEN AND CAFFEINE 50; 325; 40 MG/1; MG/1; MG/1
TABLET ORAL
Qty: 30 TABLET | Refills: 1 | Status: SHIPPED | OUTPATIENT
Start: 2023-09-08 | End: 2023-09-15 | Stop reason: SDUPTHER

## 2023-09-08 RX ORDER — TRAMADOL HYDROCHLORIDE 50 MG/1
TABLET ORAL
Qty: 28 TABLET | Refills: 0 | Status: SHIPPED | OUTPATIENT
Start: 2023-09-08 | End: 2023-10-06

## 2023-09-12 ENCOUNTER — EXTERNAL HOME HEALTH (OUTPATIENT)
Dept: HOME HEALTH SERVICES | Facility: HOSPITAL | Age: 73
End: 2023-09-12
Payer: MEDICARE

## 2023-09-15 ENCOUNTER — OUTPATIENT CASE MANAGEMENT (OUTPATIENT)
Dept: ADMINISTRATIVE | Facility: OTHER | Age: 73
End: 2023-09-15
Payer: MEDICARE

## 2023-09-15 DIAGNOSIS — R51.9 CHRONIC NONINTRACTABLE HEADACHE, UNSPECIFIED HEADACHE TYPE: ICD-10-CM

## 2023-09-15 DIAGNOSIS — I10 HYPERTENSION, UNSPECIFIED TYPE: ICD-10-CM

## 2023-09-15 DIAGNOSIS — G89.29 CHRONIC NONINTRACTABLE HEADACHE, UNSPECIFIED HEADACHE TYPE: ICD-10-CM

## 2023-09-15 RX ORDER — AMLODIPINE BESYLATE 10 MG/1
10 TABLET ORAL DAILY
Qty: 90 TABLET | Refills: 3 | Status: SHIPPED | OUTPATIENT
Start: 2023-09-15 | End: 2023-10-06

## 2023-09-15 RX ORDER — TORSEMIDE 20 MG/1
20 TABLET ORAL DAILY
Qty: 30 TABLET | Refills: 0 | Status: SHIPPED | OUTPATIENT
Start: 2023-09-15 | End: 2023-09-18 | Stop reason: SDUPTHER

## 2023-09-15 RX ORDER — BUTALBITAL, ACETAMINOPHEN AND CAFFEINE 50; 325; 40 MG/1; MG/1; MG/1
TABLET ORAL
Qty: 30 TABLET | Refills: 1 | Status: SHIPPED | OUTPATIENT
Start: 2023-09-15 | End: 2023-10-06 | Stop reason: SDUPTHER

## 2023-09-15 RX ORDER — HYDRALAZINE HYDROCHLORIDE 25 MG/1
25 TABLET, FILM COATED ORAL 3 TIMES DAILY
Qty: 60 TABLET | Refills: 0 | Status: SHIPPED | OUTPATIENT
Start: 2023-09-15 | End: 2023-10-06 | Stop reason: SDUPTHER

## 2023-09-18 ENCOUNTER — TELEPHONE (OUTPATIENT)
Dept: CARDIOLOGY | Facility: CLINIC | Age: 73
End: 2023-09-18
Payer: MEDICARE

## 2023-09-18 DIAGNOSIS — I10 HYPERTENSION, UNSPECIFIED TYPE: ICD-10-CM

## 2023-09-18 RX ORDER — TORSEMIDE 20 MG/1
40 TABLET ORAL 2 TIMES DAILY
Qty: 120 TABLET | Refills: 6 | Status: SHIPPED | OUTPATIENT
Start: 2023-09-18 | End: 2023-10-14 | Stop reason: SDUPTHER

## 2023-09-18 NOTE — TELEPHONE ENCOUNTER
She needs a soon virtual visit. Have her increase the torsemide to 2 tablets twice a day (40 mg twice a day) until the swelling improves and weight gets back to her usual range.   On line with tristen already

## 2023-09-18 NOTE — TELEPHONE ENCOUNTER
Spoke with patient let her know to start taking torsemife 40mg twice a day. Pt states she is out of medication she was taken off of them by Dr. Jain.      ----- Message from Arlene Hobbs MD sent at 9/18/2023 12:54 PM CDT -----  Regarding: RE: swelling  She needs a soon virtual visit. Have her increase the torsemide to 2 tablets twice a day (40 mg twice a day) until the swelling improves and weight gets back to her usual range.   ----- Message -----  From: Nichole Kyle RN  Sent: 9/18/2023  12:49 PM CDT  To: Arlene Hobbs MD  Subject: FW: swelling                                     FYI  Please advise  ----- Message -----  From: David Sierra RN  Sent: 9/18/2023   9:27 AM CDT  To: Anjel DANIEL Staff  Subject: swelling                                         Good morning,    I just spoke to Ms. Luther and she stated that she continues to have swelling in her b/l feet and ankles. I spoke to the home arnol nurse that was seeing her as well. She stated the swelling was 1+ and that Ms. Ovalle shoes were leaving significant indents. Ms. Ovalle states she has also gained about 10lbs in one week. She just got new compression socks and is going to wear them now. I just wanted to update you. Thank you for your time and assistance!    Kind regards,    David Sierra RN [facility]

## 2023-09-18 NOTE — TELEPHONE ENCOUNTER
Appt made for tomorrow - pt unaware- spoke to yosvany he thinks pt no longer taking torsemide at all     Will have pt call us back              ----- Message from Arlene Hobbs MD sent at 9/18/2023 12:54 PM CDT -----  Regarding: RE: swelling  She needs a soon virtual visit. Have her increase the torsemide to 2 tablets twice a day (40 mg twice a day) until the swelling improves and weight gets back to her usual range.   ----- Message -----  From: Nichole Kyle RN  Sent: 9/18/2023  12:49 PM CDT  To: Arlene Hobbs MD  Subject: FW: swelling                                     FYI  Please advise  ----- Message -----  From: David Sierra RN  Sent: 9/18/2023   9:27 AM CDT  To: Anjel DANIEL Staff  Subject: swelling                                         Good morning,    I just spoke to Ms. Luther and she stated that she continues to have swelling in her b/l feet and ankles. I spoke to the home arnol nurse that was seeing her as well. She stated the swelling was 1+ and that Ms. Ovalle shoes were leaving significant indents. Ms. Ovalle states she has also gained about 10lbs in one week. She just got new compression socks and is going to wear them now. I just wanted to update you. Thank you for your time and assistance!    Kind regards,    David Sierra RN [facility]

## 2023-09-18 NOTE — TELEPHONE ENCOUNTER
----- Message from Jimena Sorenson sent at 9/18/2023  2:30 PM CDT -----  Contact: Self [phone]  Patient is returning a phone call.    Who left a message for the patient: nurse    Does patient know what this is regarding:  meds increase     Would you like a call back, or a response through your [facility] portal?:   call back    Comments:

## 2023-09-19 ENCOUNTER — OFFICE VISIT (OUTPATIENT)
Dept: CARDIOLOGY | Facility: CLINIC | Age: 73
End: 2023-09-19
Payer: MEDICARE

## 2023-09-19 ENCOUNTER — PATIENT MESSAGE (OUTPATIENT)
Dept: FAMILY MEDICINE | Facility: CLINIC | Age: 73
End: 2023-09-19
Payer: MEDICARE

## 2023-09-19 DIAGNOSIS — R06.02 SOB (SHORTNESS OF BREATH): ICD-10-CM

## 2023-09-19 DIAGNOSIS — R60.9 EDEMA, UNSPECIFIED TYPE: ICD-10-CM

## 2023-09-19 DIAGNOSIS — E66.01 MORBID OBESITY WITH BMI OF 40.0-44.9, ADULT: ICD-10-CM

## 2023-09-19 DIAGNOSIS — R55 SYNCOPE AND COLLAPSE: ICD-10-CM

## 2023-09-19 DIAGNOSIS — I10 PRIMARY HYPERTENSION: ICD-10-CM

## 2023-09-19 DIAGNOSIS — C50.411 MALIGNANT NEOPLASM OF UPPER-OUTER QUADRANT OF RIGHT BREAST IN FEMALE, ESTROGEN RECEPTOR POSITIVE: ICD-10-CM

## 2023-09-19 DIAGNOSIS — Z86.73 HISTORY OF CVA (CEREBROVASCULAR ACCIDENT): ICD-10-CM

## 2023-09-19 DIAGNOSIS — I10 HYPERTENSION, UNSPECIFIED TYPE: ICD-10-CM

## 2023-09-19 DIAGNOSIS — I71.40 ABDOMINAL AORTIC ANEURYSM (AAA) WITHOUT RUPTURE, UNSPECIFIED PART: ICD-10-CM

## 2023-09-19 DIAGNOSIS — Z17.0 MALIGNANT NEOPLASM OF UPPER-OUTER QUADRANT OF RIGHT BREAST IN FEMALE, ESTROGEN RECEPTOR POSITIVE: ICD-10-CM

## 2023-09-19 DIAGNOSIS — I50.32 CHRONIC DIASTOLIC CONGESTIVE HEART FAILURE: Primary | ICD-10-CM

## 2023-09-19 DIAGNOSIS — E78.5 HYPERLIPIDEMIA, UNSPECIFIED HYPERLIPIDEMIA TYPE: ICD-10-CM

## 2023-09-19 PROCEDURE — 4010F ACE/ARB THERAPY RXD/TAKEN: CPT | Mod: HCNC,CPTII,95, | Performed by: STUDENT IN AN ORGANIZED HEALTH CARE EDUCATION/TRAINING PROGRAM

## 2023-09-19 PROCEDURE — 99214 PR OFFICE/OUTPT VISIT, EST, LEVL IV, 30-39 MIN: ICD-10-PCS | Mod: HCNC,95,, | Performed by: STUDENT IN AN ORGANIZED HEALTH CARE EDUCATION/TRAINING PROGRAM

## 2023-09-19 PROCEDURE — 4010F PR ACE/ARB THEARPY RXD/TAKEN: ICD-10-PCS | Mod: HCNC,CPTII,95, | Performed by: STUDENT IN AN ORGANIZED HEALTH CARE EDUCATION/TRAINING PROGRAM

## 2023-09-19 PROCEDURE — 1157F ADVNC CARE PLAN IN RCRD: CPT | Mod: HCNC,CPTII,95, | Performed by: STUDENT IN AN ORGANIZED HEALTH CARE EDUCATION/TRAINING PROGRAM

## 2023-09-19 PROCEDURE — 99214 OFFICE O/P EST MOD 30 MIN: CPT | Mod: HCNC,95,, | Performed by: STUDENT IN AN ORGANIZED HEALTH CARE EDUCATION/TRAINING PROGRAM

## 2023-09-19 PROCEDURE — 1157F PR ADVANCE CARE PLAN OR EQUIV PRESENT IN MEDICAL RECORD: ICD-10-PCS | Mod: HCNC,CPTII,95, | Performed by: STUDENT IN AN ORGANIZED HEALTH CARE EDUCATION/TRAINING PROGRAM

## 2023-09-19 RX ORDER — METOLAZONE 2.5 MG/1
TABLET ORAL
Qty: 10 TABLET | Refills: 3 | Status: SHIPPED | OUTPATIENT
Start: 2023-09-19 | End: 2023-09-20 | Stop reason: SDUPTHER

## 2023-09-19 NOTE — PROGRESS NOTES
Subjective:   Patient ID:  Susu Luther is a 73 y.o. female who presents for follow up of No chief complaint on file.      The patient location is: home  The chief complaint leading to consultation is: follow up    Visit type: audiovisual    Face to Face time with patient: 30 min  30 minutes of total time spent on the encounter, which includes face to face time and non-face to face time preparing to see the patient (eg, review of tests), Obtaining and/or reviewing separately obtained history, Documenting clinical information in the electronic or other health record, Independently interpreting results (not separately reported) and communicating results to the patient/family/caregiver, or Care coordination (not separately reported).         Each patient to whom he or she provides medical services by telemedicine is:  (1) informed of the relationship between the physician and patient and the respective role of any other health care provider with respect to management of the patient; and (2) notified that he or she may decline to receive medical services by telemedicine and may withdraw from such care at any time.    Notes:           12/1/20  69 yo female, care establish. Prior cardiologist Dr ackerman   [facility] HTN, CVA (2009), Right breast CA, Lumpectomy 3/2019, h/o PE off OAC 2 yrs ago.  AAA, s/p transcutaneous patch by Dr. Bonds, obesity knee OA imbalanced walker dependent  C/o SOB after walking few steps and dizziness. Had vision issue 1 month ago due to uncontrolled HTN  No chest pain  Sleeps with 2 pillows  Decent appetites  Leg calf pain worse at night  No smoking/drinking  ekg today NSR LVH.    ECH normal EF, grade II DD, LAE and PAP 56 mmHG   Chest CTA negative for PE  BP high    A1c controlled    S/p Open subpectoral lymph node biopsy on the right on 12/02/2020 by Dr. Starks  Still right chest, under arm and shoudler supersensitive pain      Shortness of Breath  Associated symptoms  include leg swelling. Pertinent negatives include no abdominal pain, claudication, fever, headaches, neck pain, orthopnea, PND, rash, sputum production, syncope, vomiting or wheezing.   Chest Pain   Associated symptoms include shortness of breath. Pertinent negatives include no abdominal pain, back pain, claudication, cough, diaphoresis, dizziness, fever, headaches, irregular heartbeat, malaise/fatigue, nausea, near-syncope, numbness, orthopnea, palpitations, PND, sputum production, syncope, vomiting or weakness.   Her past medical history is significant for CHF.   Pertinent negatives for past medical history include no seizures.   Congestive Heart Failure  Associated symptoms include shortness of breath. Pertinent negatives include no abdominal pain, claudication, near-syncope or palpitations.     2/28/22  Patient was admitted to [facility] for shortness of breath.  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  Started on heparin drip in transition to oral anticoagulation, now on Eliquis.  Recurrent PE.  On Femara. Has another lump in breast on right side, recently seen on PET scan.   Due to TANNER, was told to stop hctz, telmisartan.  She has ran out of clonidine. Does not take the ASA, hydralazine, amlodipine.   Blood pressure normotensive.  Reports severe headaches.  Has been out of Fioricet.  Echocardiogram with normal EF  Reports shortness of breath, chest heaviness mostly right-sided.      3/14/22  Still having SOB due to PE.  No bleeding issues while on eliquis.  Chest pain is substernal to right sided.   Lasix doesn't seem to help.   A reports intermittent leg pain right > left.  DVT ultrasound in-hospital with no evidence of DVT.  Following Hematology-Oncology.  Patient does not want surgery if needed for possible breast cancer.  Denies syncope, fever, chills.         4/18/22  Comes in with daughter who lives in [place].  Concerned that she may oxygen issues and may  need home oxygen. O2 98%  Reports LE swelling and SOB  Reports chest pain comes on with SOB, did not have chest pain prior to PE  Has not been on lasix, has been held  Reports balancing issues- can do PT once symptoms improve   Denies syncope, fever, chills.       5/16/22  Has been feeling weak last couple days  Feels chest tightness with walking, also CURIEL- feels worse than before. 97% O2 in clinic   Reports dizziness after taking medications   BP low today   Says recurrent cancer may be spreading   No bleeding on eliquis   Reports orthopnea, PND.  Not feeling well- Does not want to the hospital     Denies syncope.      6/13/22  Not feeling well today  Reports chest pain and SOB worsening over the last 2 weeks   Ddimer trending, downTroponin neg and BNP nl on 5/16/22  EKG today without significant abnormalities   Reports recent diagnosis of breast cancer is progressing  Reports right-sided arm weakness  Patient has been increasingly stressed    Denies syncope, lower extremity swelling.      7/6/2022   went to emergency room 6/13/2022, was worked up for stroke  MRI brain negative  Repeat CTA chest without PE, right-sided mass 6 cm, stable  All blood pressure medications.  Due to hypotension  Started taking Lasix today  Has significant lower extremity pain bilateral, reports blue feet at times      8/9/22  Virtual visit  Ambulates with walker   Had a fall recently due to syncopal episode, went to urgent care, negative workup per patient  Syncopal event occurred while standing up  Last visit Lasix was increased, patient reports increased thirst and water intake  Has also been taking carvedilol, reports low blood pressure  Chest pain worsened after syncopal event, has bruising on her body      9/7/22  Reports leg swelling, left  Has been taking eliquis also   Restarted taking lasix 2 weeks ago  Recently started on lyrica   Still wearing cardiac monitor  Still having SOB and chest discomfort  U/S arterial w/o significant  stenosis   BP elevated, high at home  Lost 8 lbs since 9/1      10/12/22  Virtual visit  Doing well, still getting chest pain   Still has shortness of breath but has improved   Was able to go to a football game without any issues   DVT ultrasound without thrombus   Has been treated for gout, improvement of toe pain  Has been having intermittent blood pressure elevated readings at home        2/20/23  Stress test normal   BP elevated now  Has been more tired  Drinking lots of water  Chest pain has improved with imdur  SOB stable   Has chronic pain and chronic fatigue         3/21/23  Patient was recently admitted to the hospital for right arm weakness, TANNER, chest pain, shortness of breath   MRI/CT scan of the brain were unremarkable   CTA did not show PE   Had elevated creatinine, renal ultrasound did not show arterial stenosis  BP noted to be significantly elevated   Blood pressure meds were changed, patient only taking Entresto and amlodipine   Coreg was stopped  Reports right-sided breast lump/mass currently being investigated    Today, reports still having shortness of breath  Waiting to have ultrasound done for right breast lump  Blood pressure stable today      4/25/23  Virtual visit   Continues to have intermittent chest pain, shortness of breath   Has not fallen since last visit   Blood pressure has been stable   Metanephrine levels are elevated  No bleeding on Eliquis      9/18/23  Virtual visit   Was admitted to [facility] 7/23 for chest pain and recurrent falls and worsening swelling in her legs  Lasix was switched to torsemide  Continues to have leg pain and swelling  Has been seeing Maria Isabel in [facility]  Currently taking torsemide 20 mg b.i.d.  No improvement in her leg pain and leg swelling since discharge from the hospital   Recent DVT ultrasound of right leg with no clot  Has been wearing compression stockings but difficult recently due to leg swelling      EKG 2/20/23 NSR, PVCs, LAD, minimal LVH  EKG  6/13/22 NSR, LAD, LVH, 93 bpm, qtc 455 ms  EKG 5/16/22 SB, incomplete RBBB, LVH, septal infarct, qtc 422 ms   Echo 2/28/22  The left ventricle is normal in size with concentric hypertrophy and normal systolic function.  The estimated ejection fraction is 60%.  Normal left ventricular diastolic function.  Normal right ventricular size with normal right ventricular systolic function.  Mild to moderate tricuspid regurgitation.  Normal central venous pressure (3 mmHg).  The estimated PA systolic pressure is 36 mmHg.       Echo 2019  CONCLUSIONS     1 - Mild left atrial enlargement.     2 - Concentric hypertrophy.     3 - No wall motion abnormalities.     4 - Normal left ventricular systolic function (EF 60-65%).     5 - Impaired LV relaxation, elevated LAP (grade 2 diastolic dysfunction).     6 - Normal right ventricular systolic function .     7 - The estimated PA systolic pressure is 36 mmHg.     8 - Mild tricuspid regurgitation.        Past Medical History:   Diagnosis Date    Cataract     Bilateral    Chronic diastolic congestive heart failure 08/25/2020    Dizziness 03/12/2023    Encounter for blood transfusion     History of repair of aneurysm of abdominal aorta using endovascular stent graft     DR Bonds     of psychiatric care     Hypertension     Major depressive disorder, single episode, moderate with anxious distress 01/14/2020    Malignant neoplasm of upper-outer quadrant of right breast in female, estrogen receptor positive 03/14/2019    radiation    Psychiatric problem     Sleep apnea     Sleep difficulties     Stroke 2009    no residual defect    Therapy        Past Surgical History:   Procedure Laterality Date    APPENDECTOMY      AXILLARY NODE DISSECTION Right 12/02/2020    Procedure: LYMPHADENECTOMY, AXILLARY;  Surgeon: Artemio Starks MD;  Location: [facility];  Service: General;  Laterality: Right;    BIOPSY OF AXILLARY NODE Right 03/02/2021    Procedure: BIOPSY, LYMPH NODE, AXILLARY;  Surgeon:  Artemio Sutton MD;  Location: [address];  Service: General;  Laterality: Right;    BREAST BIOPSY      2019    BREAST LUMPECTOMY Right     2019     SECTION      x 1    OOPHORECTOMY      right     SENTINEL LYMPH NODE BIOPSY Right 2019    Procedure: BIOPSY, LYMPH NODE, SENTINEL;  Surgeon: Artemio Starks MD;  Location: [facility] OR;  Service: General;  Laterality: Right;    splenic artery aneurysm repair      TONSILLECTOMY         Social History     Tobacco Use    Smoking status: Never     Passive exposure: Past    Smokeless tobacco: Never   Substance Use Topics    Alcohol use: No    Drug use: No       Family History   Problem Relation Age of Onset    Diabetes Maternal Grandmother     Diabetes Maternal Grandfather     Diabetes Mother     Hypertension Mother     Sickle cell anemia Daughter     Breast cancer Neg Hx     Colon cancer Neg Hx     Ovarian cancer Neg Hx          Review of Systems   Constitutional: Negative for decreased appetite, diaphoresis, fever, malaise/fatigue and night sweats.   HENT:  Negative for nosebleeds.    Eyes:  Negative for blurred vision and double vision.   Cardiovascular:  Positive for leg swelling. Negative for claudication, irregular heartbeat, near-syncope, orthopnea, palpitations, paroxysmal nocturnal dyspnea and syncope.   Respiratory:  Positive for shortness of breath. Negative for cough, sleep disturbances due to breathing, snoring, sputum production and wheezing.    Endocrine: Negative for cold intolerance and polyuria.   Hematologic/Lymphatic: Does not bruise/bleed easily.   Skin:  Negative for rash.   Musculoskeletal:  Negative for back pain, falls, joint pain, joint swelling and neck pain.        Right chest breast and shoulder pain   Gastrointestinal:  Negative for abdominal pain, heartburn, nausea and vomiting.   Genitourinary:  Negative for dysuria, frequency and hematuria.   Neurological:  Negative for difficulty with concentration, dizziness, focal  weakness, headaches, light-headedness, numbness, seizures and weakness.   Psychiatric/Behavioral:  Negative for depression, memory loss and substance abuse. The patient does not have insomnia.    Allergic/Immunologic: Negative for HIV exposure and hives.     There were no vitals filed for this visit.          Objective:   Physical Exam  Constitutional:       Comments: Mild distress.    HENT:      Head: Normocephalic.   Eyes:      Pupils: Pupils are equal, round, and reactive to light.   Neck:      Thyroid: No thyromegaly.      Vascular: Normal carotid pulses. No carotid bruit or JVD.   Cardiovascular:      Rate and Rhythm: Normal rate and regular rhythm. No extrasystoles are present.     Chest Wall: PMI is not displaced.      Pulses: Normal pulses.      Heart sounds: Normal heart sounds. No murmur heard.     No gallop. No S3 sounds.   Pulmonary:      Effort: No respiratory distress.      Breath sounds: Normal breath sounds. No stridor.   Abdominal:      General: Bowel sounds are normal.      Palpations: Abdomen is soft.      Tenderness: There is no abdominal tenderness. There is no rebound.   Musculoskeletal:         General: Swelling present. Normal range of motion.      Comments: Tender to palpitation   Skin:     Findings: No rash.   Neurological:      Mental Status: She is alert and oriented to person, place, and time.   Psychiatric:         Behavior: Behavior normal.         Lab Results   Component Value Date    CHOL 234 (H) 02/28/2023    CHOL 229 (H) 04/18/2022    CHOL 221 (H) 10/19/2020     Lab Results   Component Value Date    HDL 44 02/28/2023    HDL 45 04/18/2022    HDL 55 10/19/2020     Lab Results   Component Value Date    LDLCALC 151.8 02/28/2023    LDLCALC 150.4 04/18/2022    LDLCALC 137.4 10/19/2020     Lab Results   Component Value Date    TRIG 191 (H) 02/28/2023    TRIG 168 (H) 04/18/2022    TRIG 143 10/19/2020     Lab Results   Component Value Date    CHOLHDL 18.8 (L) 02/28/2023    CHOLHDL 19.7 (L)  04/18/2022    CHOLHDL 24.9 10/19/2020       Chemistry        Component Value Date/Time     08/25/2023 1045    K 3.8 08/25/2023 1045     08/25/2023 1045    CO2 25 08/25/2023 1045    BUN 27 (H) 08/25/2023 1045    CREATININE 1.8 (H) 08/25/2023 1045     (H) 08/25/2023 1045        Component Value Date/Time    CALCIUM 9.2 08/25/2023 1045    ALKPHOS 115 08/03/2023 1508    AST 51 (H) 08/03/2023 1508    ALT 15 08/03/2023 1508    BILITOT 0.2 08/03/2023 1508    ESTGFRAFRICA 37 (A) 07/06/2022 1312    EGFRNONAA 32 (A) 07/06/2022 1312          Lab Results   Component Value Date    HGBA1C 5.9 (H) 05/18/2019     Lab Results   Component Value Date    TSH 1.691 03/10/2023     Lab Results   Component Value Date    INR 1.1 03/11/2023    INR 0.9 02/15/2022    INR 1.0 11/10/2020     Lab Results   Component Value Date    WBC 9.05 08/03/2023    HGB 11.8 (L) 08/03/2023    HCT 37.2 08/03/2023    MCV 81 (L) 08/03/2023     08/03/2023     BMP  Sodium   Date Value Ref Range Status   08/25/2023 145 136 - 145 mmol/L Final     Potassium   Date Value Ref Range Status   08/25/2023 3.8 3.5 - 5.1 mmol/L Final     Chloride   Date Value Ref Range Status   08/25/2023 109 95 - 110 mmol/L Final     CO2   Date Value Ref Range Status   08/25/2023 25 23 - 29 mmol/L Final     BUN   Date Value Ref Range Status   08/25/2023 27 (H) 8 - 23 mg/dL Final     Creatinine   Date Value Ref Range Status   08/25/2023 1.8 (H) 0.5 - 1.4 mg/dL Final     Calcium   Date Value Ref Range Status   08/25/2023 9.2 8.7 - 10.5 mg/dL Final     Anion Gap   Date Value Ref Range Status   08/25/2023 11 8 - 16 mmol/L Final     eGFR if    Date Value Ref Range Status   07/06/2022 37 (A) >60 mL/min/1.73 m^2 Final     eGFR if non    Date Value Ref Range Status   07/06/2022 32 (A) >60 mL/min/1.73 m^2 Final     Comment:     Calculation used to obtain the estimated glomerular filtration  rate (eGFR) is the CKD-EPI equation.         BNP  @LABRCNTIP(BNP,BNPTRIAGEBLO)@  @LABRCNTIP(troponini)@  CrCl cannot be calculated (Patient's most recent lab result is older than the maximum 7 days allowed.).  No results found in the last 24 hours.  No results found in the last 24 hours.  No results found in the last 24 hours.    Assessment:      1. Chronic diastolic congestive heart failure    2. Primary hypertension    3. Syncope and collapse    4. Hyperlipidemia, unspecified hyperlipidemia type    5. History of CVA (cerebrovascular accident)    6. Edema, unspecified type    7. Morbid obesity with BMI of 40.0-44.9, adult    8. SOB (shortness of breath)    9. Abdominal aortic aneurysm (AAA) without rupture, unspecified part    10. Malignant neoplasm of upper-outer quadrant of right breast in female, estrogen receptor positive    11. Hypertension, unspecified type              Plan:     Left leg swelling/edema  Increase torsemide to 40 mg bid  Metolazone once   Venous ultrasound 10/22 and 9/23 without DVT  Refer to lymphed[facility] clinic at [facility] to check labs in 3 days    Hypertension  Stable but significant fluctuations  Carvedilol, hydralazine stopped  Continue Entresto, amlodipine   Elevated metanephrines- will re-refer if fluctuations continue      Chest pain/shortness of breath-stable  CTA 3/23 did not show PE   Stress test 1/23 normal stress test     Syncope- resolved   Possibly due to dehydration  14 day monitor- 7.27% PACs, essentially asymptomatic    History of right breast cancer  Follows with Hematology-Oncology    Diastolic heart failure  Echo 3/23 with normal EF, mild-mod TR    Right leg pain-stable  DVT ultrasound 10/22 without evidence of DVT   Normal ABIs 3/22  Arterial ultrasound 8/22 without significant stenosis    Recurrent pulmonary embolus   Recurrent PE as of 2/22  Continue OAC    History of CVA  Carotid ultrasound 6/22 no significant stenosis, MRI brain 6/22 no abnormality  Currently not on aspirin as she is taking Eliquis  Continue  statin    HLD   as of 2/23  Continue statin    AAA s/p transcutaneous patch  Stable    Morbid obesity, BMI > 40  Low-salt, low-fat diet  Exercise as tolerated      All labs and cardiac procedures reviewed.      Return to clinic 1 month    Arlene Hobbs MD  Cardiology Staff

## 2023-09-20 ENCOUNTER — PATIENT MESSAGE (OUTPATIENT)
Dept: CARDIOLOGY | Facility: CLINIC | Age: 73
End: 2023-09-20
Payer: MEDICARE

## 2023-09-20 RX ORDER — METOLAZONE 2.5 MG/1
TABLET ORAL
Qty: 10 TABLET | Refills: 3 | Status: SHIPPED | OUTPATIENT
Start: 2023-09-20 | End: 2023-10-14 | Stop reason: SDUPTHER

## 2023-09-20 NOTE — TELEPHONE ENCOUNTER
Follow up with the cardiologist about your swelling  [facility]. Please reach out to them for your medication

## 2023-09-22 ENCOUNTER — LAB VISIT (OUTPATIENT)
Dept: LAB | Facility: HOSPITAL | Age: 73
End: 2023-09-22
Attending: STUDENT IN AN ORGANIZED HEALTH CARE EDUCATION/TRAINING PROGRAM
Payer: MEDICARE

## 2023-09-22 DIAGNOSIS — I50.32 CHRONIC DIASTOLIC HEART FAILURE: Primary | ICD-10-CM

## 2023-09-22 PROCEDURE — 80053 COMPREHEN METABOLIC PANEL: CPT | Mod: HCNC | Performed by: STUDENT IN AN ORGANIZED HEALTH CARE EDUCATION/TRAINING PROGRAM

## 2023-09-22 PROCEDURE — 36415 COLL VENOUS BLD VENIPUNCTURE: CPT | Mod: HCNC,PN | Performed by: STUDENT IN AN ORGANIZED HEALTH CARE EDUCATION/TRAINING PROGRAM

## 2023-09-23 LAB
ALBUMIN SERPL BCP-MCNC: 3.1 G/DL (ref 3.5–5.2)
ALP SERPL-CCNC: 106 U/L (ref 55–135)
ALT SERPL W/O P-5'-P-CCNC: 11 U/L (ref 10–44)
ANION GAP SERPL CALC-SCNC: 10 MMOL/L (ref 8–16)
AST SERPL-CCNC: 16 U/L (ref 10–40)
BILIRUB SERPL-MCNC: 0.3 MG/DL (ref 0.1–1)
BUN SERPL-MCNC: 30 MG/DL (ref 8–23)
CALCIUM SERPL-MCNC: 9.3 MG/DL (ref 8.7–10.5)
CHLORIDE SERPL-SCNC: 106 MMOL/L (ref 95–110)
CO2 SERPL-SCNC: 25 MMOL/L (ref 23–29)
CREAT SERPL-MCNC: 1.8 MG/DL (ref 0.5–1.4)
EST. GFR  (NO RACE VARIABLE): 29.8 ML/MIN/1.73 M^2
GLUCOSE SERPL-MCNC: 108 MG/DL (ref 70–110)
POTASSIUM SERPL-SCNC: 3.9 MMOL/L (ref 3.5–5.1)
PROT SERPL-MCNC: 7.1 G/DL (ref 6–8.4)
SODIUM SERPL-SCNC: 141 MMOL/L (ref 136–145)

## 2023-09-25 ENCOUNTER — OFFICE VISIT (OUTPATIENT)
Dept: CARDIOLOGY | Facility: CLINIC | Age: 73
End: 2023-09-25
Payer: MEDICARE

## 2023-09-25 DIAGNOSIS — I10 PRIMARY HYPERTENSION: ICD-10-CM

## 2023-09-25 DIAGNOSIS — C50.411 MALIGNANT NEOPLASM OF UPPER-OUTER QUADRANT OF RIGHT BREAST IN FEMALE, ESTROGEN RECEPTOR POSITIVE: ICD-10-CM

## 2023-09-25 DIAGNOSIS — R60.9 EDEMA, UNSPECIFIED TYPE: Primary | ICD-10-CM

## 2023-09-25 DIAGNOSIS — R55 SYNCOPE AND COLLAPSE: ICD-10-CM

## 2023-09-25 DIAGNOSIS — I71.40 ABDOMINAL AORTIC ANEURYSM (AAA) WITHOUT RUPTURE, UNSPECIFIED PART: ICD-10-CM

## 2023-09-25 DIAGNOSIS — I50.32 CHRONIC DIASTOLIC CONGESTIVE HEART FAILURE: ICD-10-CM

## 2023-09-25 DIAGNOSIS — Z86.73 HISTORY OF CVA (CEREBROVASCULAR ACCIDENT): ICD-10-CM

## 2023-09-25 DIAGNOSIS — E66.01 MORBID OBESITY WITH BMI OF 40.0-44.9, ADULT: ICD-10-CM

## 2023-09-25 DIAGNOSIS — Z17.0 MALIGNANT NEOPLASM OF UPPER-OUTER QUADRANT OF RIGHT BREAST IN FEMALE, ESTROGEN RECEPTOR POSITIVE: ICD-10-CM

## 2023-09-25 DIAGNOSIS — E78.5 HYPERLIPIDEMIA, UNSPECIFIED HYPERLIPIDEMIA TYPE: ICD-10-CM

## 2023-09-25 DIAGNOSIS — R06.02 SOB (SHORTNESS OF BREATH): ICD-10-CM

## 2023-09-25 PROCEDURE — 99214 PR OFFICE/OUTPT VISIT, EST, LEVL IV, 30-39 MIN: ICD-10-PCS | Mod: HCNC,95,, | Performed by: STUDENT IN AN ORGANIZED HEALTH CARE EDUCATION/TRAINING PROGRAM

## 2023-09-25 PROCEDURE — 1157F PR ADVANCE CARE PLAN OR EQUIV PRESENT IN MEDICAL RECORD: ICD-10-PCS | Mod: HCNC,CPTII,95, | Performed by: STUDENT IN AN ORGANIZED HEALTH CARE EDUCATION/TRAINING PROGRAM

## 2023-09-25 PROCEDURE — 1157F ADVNC CARE PLAN IN RCRD: CPT | Mod: HCNC,CPTII,95, | Performed by: STUDENT IN AN ORGANIZED HEALTH CARE EDUCATION/TRAINING PROGRAM

## 2023-09-25 PROCEDURE — 4010F ACE/ARB THERAPY RXD/TAKEN: CPT | Mod: HCNC,CPTII,95, | Performed by: STUDENT IN AN ORGANIZED HEALTH CARE EDUCATION/TRAINING PROGRAM

## 2023-09-25 PROCEDURE — 99214 OFFICE O/P EST MOD 30 MIN: CPT | Mod: HCNC,95,, | Performed by: STUDENT IN AN ORGANIZED HEALTH CARE EDUCATION/TRAINING PROGRAM

## 2023-09-25 PROCEDURE — 4010F PR ACE/ARB THEARPY RXD/TAKEN: ICD-10-PCS | Mod: HCNC,CPTII,95, | Performed by: STUDENT IN AN ORGANIZED HEALTH CARE EDUCATION/TRAINING PROGRAM

## 2023-09-25 NOTE — PROGRESS NOTES
Subjective:   Patient ID:  Susu Luther is a 73 y.o. female who presents for follow up of No chief complaint on file.      The patient location is: home  The chief complaint leading to consultation is: follow up    Visit type: audiovisual    Face to Face time with patient: 10 min  10 minutes of total time spent on the encounter, which includes face to face time and non-face to face time preparing to see the patient (eg, review of tests), Obtaining and/or reviewing separately obtained history, Documenting clinical information in the electronic or other health record, Independently interpreting results (not separately reported) and communicating results to the patient/family/caregiver, or Care coordination (not separately reported).         Each patient to whom he or she provides medical services by telemedicine is:  (1) informed of the relationship between the physician and patient and the respective role of any other health care provider with respect to management of the patient; and (2) notified that he or she may decline to receive medical services by telemedicine and may withdraw from such care at any time.    Notes:           12/1/20  69 yo female, care establish. Prior cardiologist Dr ackerman   [facility] HTN, CVA (2009), Right breast CA, Lumpectomy 3/2019, h/o PE off OAC 2 yrs ago.  AAA, s/p transcutaneous patch by Dr. Bonds, obesity knee OA imbalanced walker dependent  C/o SOB after walking few steps and dizziness. Had vision issue 1 month ago due to uncontrolled HTN  No chest pain  Sleeps with 2 pillows  Decent appetites  Leg calf pain worse at night  No smoking/drinking  ekg today NSR LVH.    ECH normal EF, grade II DD, LAE and PAP 56 mmHG   Chest CTA negative for PE  BP high    A1c controlled    S/p Open subpectoral lymph node biopsy on the right on 12/02/2020 by Dr. Starks  Still right chest, under arm and shoudler supersensitive pain      Shortness of Breath  Associated symptoms  include leg swelling. Pertinent negatives include no abdominal pain, claudication, fever, headaches, neck pain, orthopnea, PND, rash, sputum production, syncope, vomiting or wheezing.   Chest Pain   Associated symptoms include shortness of breath. Pertinent negatives include no abdominal pain, back pain, claudication, cough, diaphoresis, dizziness, fever, headaches, irregular heartbeat, malaise/fatigue, nausea, near-syncope, numbness, orthopnea, palpitations, PND, sputum production, syncope, vomiting or weakness.   Her past medical history is significant for CHF.   Pertinent negatives for past medical history include no seizures.   Congestive Heart Failure  Associated symptoms include shortness of breath. Pertinent negatives include no abdominal pain, claudication, near-syncope or palpitations.     2/28/22  Patient was admitted to [facility] for shortness of breath.  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  Started on heparin drip in transition to oral anticoagulation, now on Eliquis.  Recurrent PE.  On Femara. Has another lump in breast on right side, recently seen on PET scan.   Due to TANNER, was told to stop hctz, telmisartan.  She has ran out of clonidine. Does not take the ASA, hydralazine, amlodipine.   Blood pressure normotensive.  Reports severe headaches.  Has been out of Fioricet.  Echocardiogram with normal EF  Reports shortness of breath, chest heaviness mostly right-sided.      3/14/22  Still having SOB due to PE.  No bleeding issues while on eliquis.  Chest pain is substernal to right sided.   Lasix doesn't seem to help.   A reports intermittent leg pain right > left.  DVT ultrasound in-hospital with no evidence of DVT.  Following Hematology-Oncology.  Patient does not want surgery if needed for possible breast cancer.  Denies syncope, fever, chills.         4/18/22  Comes in with daughter who lives in [place].  Concerned that she may oxygen issues and may  need home oxygen. O2 98%  Reports LE swelling and SOB  Reports chest pain comes on with SOB, did not have chest pain prior to PE  Has not been on lasix, has been held  Reports balancing issues- can do PT once symptoms improve   Denies syncope, fever, chills.       5/16/22  Has been feeling weak last couple days  Feels chest tightness with walking, also CURIEL- feels worse than before. 97% O2 in clinic   Reports dizziness after taking medications   BP low today   Says recurrent cancer may be spreading   No bleeding on eliquis   Reports orthopnea, PND.  Not feeling well- Does not want to the hospital     Denies syncope.      6/13/22  Not feeling well today  Reports chest pain and SOB worsening over the last 2 weeks   Ddimer trending, downTroponin neg and BNP nl on 5/16/22  EKG today without significant abnormalities   Reports recent diagnosis of breast cancer is progressing  Reports right-sided arm weakness  Patient has been increasingly stressed    Denies syncope, lower extremity swelling.      7/6/2022   went to emergency room 6/13/2022, was worked up for stroke  MRI brain negative  Repeat CTA chest without PE, right-sided mass 6 cm, stable  All blood pressure medications.  Due to hypotension  Started taking Lasix today  Has significant lower extremity pain bilateral, reports blue feet at times      8/9/22  Virtual visit  Ambulates with walker   Had a fall recently due to syncopal episode, went to urgent care, negative workup per patient  Syncopal event occurred while standing up  Last visit Lasix was increased, patient reports increased thirst and water intake  Has also been taking carvedilol, reports low blood pressure  Chest pain worsened after syncopal event, has bruising on her body      9/7/22  Reports leg swelling, left  Has been taking eliquis also   Restarted taking lasix 2 weeks ago  Recently started on lyrica   Still wearing cardiac monitor  Still having SOB and chest discomfort  U/S arterial w/o significant  stenosis   BP elevated, high at home  Lost 8 lbs since 9/1      10/12/22  Virtual visit  Doing well, still getting chest pain   Still has shortness of breath but has improved   Was able to go to a football game without any issues   DVT ultrasound without thrombus   Has been treated for gout, improvement of toe pain  Has been having intermittent blood pressure elevated readings at home        2/20/23  Stress test normal   BP elevated now  Has been more tired  Drinking lots of water  Chest pain has improved with imdur  SOB stable   Has chronic pain and chronic fatigue         3/21/23  Patient was recently admitted to the hospital for right arm weakness, TANNER, chest pain, shortness of breath   MRI/CT scan of the brain were unremarkable   CTA did not show PE   Had elevated creatinine, renal ultrasound did not show arterial stenosis  BP noted to be significantly elevated   Blood pressure meds were changed, patient only taking Entresto and amlodipine   Coreg was stopped  Reports right-sided breast lump/mass currently being investigated    Today, reports still having shortness of breath  Waiting to have ultrasound done for right breast lump  Blood pressure stable today      4/25/23  Virtual visit   Continues to have intermittent chest pain, shortness of breath   Has not fallen since last visit   Blood pressure has been stable   Metanephrine levels are elevated  No bleeding on Eliquis      9/18/23  Virtual visit   Was admitted to [facility] 7/23 for chest pain and recurrent falls and worsening swelling in her legs  Lasix was switched to torsemide  Continues to have leg pain and swelling  Has been seeing Maria Isabel in [facility]  Currently taking torsemide 20 mg b.i.d.  No improvement in her leg pain and leg swelling since discharge from the hospital   Recent DVT ultrasound of right leg with no clot  Has been wearing compression stockings but difficult recently due to leg swelling      9/25/23  Virtual visit   Took metolazone  once  Taking torsemide 40 mg b.i.d.  Swelling improving per patient but not a lot pain   CKD mildly worsening on recent labs        EKG 2/20/23 NSR, PVCs, LAD, minimal LVH  EKG 6/13/22 NSR, LAD, LVH, 93 bpm, qtc 455 ms  EKG 5/16/22 SB, incomplete RBBB, LVH, septal infarct, qtc 422 ms   Echo 2/28/22  The left ventricle is normal in size with concentric hypertrophy and normal systolic function.  The estimated ejection fraction is 60%.  Normal left ventricular diastolic function.  Normal right ventricular size with normal right ventricular systolic function.  Mild to moderate tricuspid regurgitation.  Normal central venous pressure (3 mmHg).  The estimated PA systolic pressure is 36 mmHg.       Echo 2019  CONCLUSIONS     1 - Mild left atrial enlargement.     2 - Concentric hypertrophy.     3 - No wall motion abnormalities.     4 - Normal left ventricular systolic function (EF 60-65%).     5 - Impaired LV relaxation, elevated LAP (grade 2 diastolic dysfunction).     6 - Normal right ventricular systolic function .     7 - The estimated PA systolic pressure is 36 mmHg.     8 - Mild tricuspid regurgitation.        Past Medical History:   Diagnosis Date    Cataract     Bilateral    Chronic diastolic congestive heart failure 08/25/2020    Dizziness 03/12/2023    Encounter for blood transfusion     History of repair of aneurysm of abdominal aorta using endovascular stent graft     DR Bonds    Hx of psychiatric care     Hypertension     Major depressive disorder, single episode, moderate with anxious distress 01/14/2020    Malignant neoplasm of upper-outer quadrant of right breast in female, estrogen receptor positive 03/14/2019    radiation    Psychiatric problem     Sleep apnea     Sleep difficulties     Stroke 2009    no residual defect    Therapy        Past Surgical History:   Procedure Laterality Date    APPENDECTOMY      AXILLARY NODE DISSECTION Right 12/02/2020    Procedure: LYMPHADENECTOMY, AXILLARY;  Surgeon:  Artemio Starks MD;  Location: [facility] OR;  Service: General;  Laterality: Right;    BIOPSY OF AXILLARY NODE Right 2021    Procedure: BIOPSY, LYMPH NODE, AXILLARY;  Surgeon: Artemio Sutton MD;  Location: [address];  Service: General;  Laterality: Right;    BREAST BIOPSY      2019    BREAST LUMPECTOMY Right     2019     SECTION      x 1    OOPHORECTOMY      right     SENTINEL LYMPH NODE BIOPSY Right 2019    Procedure: BIOPSY, LYMPH NODE, SENTINEL;  Surgeon: Artemio Starks MD;  Location: [facility] OR;  Service: General;  Laterality: Right;    splenic artery aneurysm repair      TONSILLECTOMY         Social History     Tobacco Use    Smoking status: Never     Passive exposure: Past    Smokeless tobacco: Never   Substance Use Topics    Alcohol use: No    Drug use: No       Family History   Problem Relation Age of Onset    Diabetes Maternal Grandmother     Diabetes Maternal Grandfather     Diabetes Mother     Hypertension Mother     Sickle cell anemia Daughter     Breast cancer Neg Hx     Colon cancer Neg Hx     Ovarian cancer Neg Hx          Review of Systems   Constitutional: Negative for decreased appetite, diaphoresis, fever, malaise/fatigue and night sweats.   HENT:  Negative for nosebleeds.    Eyes:  Negative for blurred vision and double vision.   Cardiovascular:  Positive for leg swelling. Negative for claudication, irregular heartbeat, near-syncope, orthopnea, palpitations, paroxysmal nocturnal dyspnea and syncope.   Respiratory:  Positive for shortness of breath. Negative for cough, sleep disturbances due to breathing, snoring, sputum production and wheezing.    Endocrine: Negative for cold intolerance and polyuria.   Hematologic/Lymphatic: Does not bruise/bleed easily.   Skin:  Negative for rash.   Musculoskeletal:  Negative for back pain, falls, joint pain, joint swelling and neck pain.        Right chest breast and shoulder pain   Gastrointestinal:  Negative for abdominal pain,  heartburn, nausea and vomiting.   Genitourinary:  Negative for dysuria, frequency and hematuria.   Neurological:  Negative for difficulty with concentration, dizziness, focal weakness, headaches, light-headedness, numbness, seizures and weakness.   Psychiatric/Behavioral:  Negative for depression, memory loss and substance abuse. The patient does not have insomnia.    Allergic/Immunologic: Negative for HIV exposure and hives.     There were no vitals filed for this visit.          Objective:   Physical Exam  Constitutional:       Comments: Mild distress.    HENT:      Head: Normocephalic.   Eyes:      Pupils: Pupils are equal, round, and reactive to light.   Neck:      Thyroid: No thyromegaly.      Vascular: Normal carotid pulses. No carotid bruit or JVD.   Cardiovascular:      Rate and Rhythm: Normal rate and regular rhythm. No extrasystoles are present.     Chest Wall: PMI is not displaced.      Pulses: Normal pulses.      Heart sounds: Normal heart sounds. No murmur heard.     No gallop. No S3 sounds.   Pulmonary:      Effort: No respiratory distress.      Breath sounds: Normal breath sounds. No stridor.   Abdominal:      General: Bowel sounds are normal.      Palpations: Abdomen is soft.      Tenderness: There is no abdominal tenderness. There is no rebound.   Musculoskeletal:         General: Swelling present. Normal range of motion.      Comments: Tender to palpitation   Skin:     Findings: No rash.   Neurological:      Mental Status: She is alert and oriented to person, place, and time.   Psychiatric:         Behavior: Behavior normal.         Lab Results   Component Value Date    CHOL 234 (H) 02/28/2023    CHOL 229 (H) 04/18/2022    CHOL 221 (H) 10/19/2020     Lab Results   Component Value Date    HDL 44 02/28/2023    HDL 45 04/18/2022    HDL 55 10/19/2020     Lab Results   Component Value Date    LDLCALC 151.8 02/28/2023    LDLCALC 150.4 04/18/2022    LDLCALC 137.4 10/19/2020     Lab Results   Component  Value Date    TRIG 191 (H) 02/28/2023    TRIG 168 (H) 04/18/2022    TRIG 143 10/19/2020     Lab Results   Component Value Date    CHOLHDL 18.8 (L) 02/28/2023    CHOLHDL 19.7 (L) 04/18/2022    CHOLHDL 24.9 10/19/2020       Chemistry        Component Value Date/Time     09/22/2023 0920    K 3.9 09/22/2023 0920     09/22/2023 0920    CO2 25 09/22/2023 0920    BUN 30 (H) 09/22/2023 0920    CREATININE 1.8 (H) 09/22/2023 0920     09/22/2023 0920        Component Value Date/Time    CALCIUM 9.3 09/22/2023 0920    ALKPHOS 106 09/22/2023 0920    AST 16 09/22/2023 0920    ALT 11 09/22/2023 0920    BILITOT 0.3 09/22/2023 0920    ESTGFRAFRICA 37 (A) 07/06/2022 1312    EGFRNONAA 32 (A) 07/06/2022 1312          Lab Results   Component Value Date    HGBA1C 5.9 (H) 05/18/2019     Lab Results   Component Value Date    TSH 1.691 03/10/2023     Lab Results   Component Value Date    INR 1.1 03/11/2023    INR 0.9 02/15/2022    INR 1.0 11/10/2020     Lab Results   Component Value Date    WBC 9.05 08/03/2023    HGB 11.8 (L) 08/03/2023    HCT 37.2 08/03/2023    MCV 81 (L) 08/03/2023     08/03/2023     BMP  Sodium   Date Value Ref Range Status   09/22/2023 141 136 - 145 mmol/L Final     Potassium   Date Value Ref Range Status   09/22/2023 3.9 3.5 - 5.1 mmol/L Final     Chloride   Date Value Ref Range Status   09/22/2023 106 95 - 110 mmol/L Final     CO2   Date Value Ref Range Status   09/22/2023 25 23 - 29 mmol/L Final     BUN   Date Value Ref Range Status   09/22/2023 30 (H) 8 - 23 mg/dL Final     Creatinine   Date Value Ref Range Status   09/22/2023 1.8 (H) 0.5 - 1.4 mg/dL Final     Calcium   Date Value Ref Range Status   09/22/2023 9.3 8.7 - 10.5 mg/dL Final     Anion Gap   Date Value Ref Range Status   09/22/2023 10 8 - 16 mmol/L Final     eGFR if    Date Value Ref Range Status   07/06/2022 37 (A) >60 mL/min/1.73 m^2 Final     eGFR if non    Date Value Ref Range Status    07/06/2022 32 (A) >60 mL/min/1.73 m^2 Final     Comment:     Calculation used to obtain the estimated glomerular filtration  rate (eGFR) is the CKD-EPI equation.        BNP  @LABRCNTIP(BNP,BNPTRIAGEBLO)@  @LABRCNTIP(troponini)@  CrCl cannot be calculated (Unknown ideal weight.).  No results found in the last 24 hours.  No results found in the last 24 hours.  No results found in the last 24 hours.    Assessment:      1. Edema, unspecified type    2. Chronic diastolic congestive heart failure    3. Primary hypertension    4. Syncope and collapse    5. Hyperlipidemia, unspecified hyperlipidemia type    6. History of CVA (cerebrovascular accident)    7. Morbid obesity with BMI of 40.0-44.9, adult    8. SOB (shortness of breath)    9. Abdominal aortic aneurysm (AAA) without rupture, unspecified part    10. Malignant neoplasm of upper-outer quadrant of right breast in female, estrogen receptor positive                Plan:     Left leg swelling/edema  Continue torsemide 40 mg bid for now  Metolazone once (can repeat if needed for worsening swelling, only once)  Venous ultrasound 10/22 and 9/23 without DVT  Refer to l[facility] at [facility]- pending   Check labs in 2 days    Hypertension  Stable but significant fluctuations  Carvedilol, hydralazine stopped  Continue Entresto, amlodipine   Elevated metanephrines- will re-refer if fluctuations continue      Chest pain/shortness of breath-stable  CTA 3/23 did not show PE   Stress test 1/23 normal stress test     Syncope- resolved   Possibly due to dehydration  14 day monitor- 7.27% PACs, essentially asymptomatic    History of right breast cancer  Follows with Hematology-Oncology    Diastolic heart failure  Echo 3/23 with normal EF, mild-mod TR    Right leg pain-stable  DVT ultrasound 10/22 without evidence of DVT   Normal ABIs 3/22  Arterial ultrasound 8/22 without significant stenosis    Recurrent pulmonary embolus   Recurrent PE as of 2/22  Continue OAC    History of  CVA  Carotid ultrasound 6/22 no significant stenosis, MRI brain 6/22 no abnormality  Currently not on aspirin as she is taking Eliquis  Continue statin    HLD   as of 2/23  Continue statin    AAA s/p transcutaneous patch  Stable    Morbid obesity, BMI > 40  Low-salt, low-fat diet  Exercise as tolerated      All labs and cardiac procedures reviewed.      Return to clinic 2-3 weeks    Arlene Hobbs MD  Cardiology Staff

## 2023-09-26 ENCOUNTER — LAB VISIT (OUTPATIENT)
Dept: LAB | Facility: HOSPITAL | Age: 73
End: 2023-09-26
Attending: STUDENT IN AN ORGANIZED HEALTH CARE EDUCATION/TRAINING PROGRAM
Payer: MEDICARE

## 2023-09-26 DIAGNOSIS — R60.9 EDEMA, UNSPECIFIED TYPE: ICD-10-CM

## 2023-09-26 LAB — BNP SERPL-MCNC: 19 PG/ML (ref 0–99)

## 2023-09-26 PROCEDURE — 83880 ASSAY OF NATRIURETIC PEPTIDE: CPT | Mod: HCNC | Performed by: STUDENT IN AN ORGANIZED HEALTH CARE EDUCATION/TRAINING PROGRAM

## 2023-09-26 PROCEDURE — 80053 COMPREHEN METABOLIC PANEL: CPT | Mod: HCNC | Performed by: STUDENT IN AN ORGANIZED HEALTH CARE EDUCATION/TRAINING PROGRAM

## 2023-09-26 PROCEDURE — 36415 COLL VENOUS BLD VENIPUNCTURE: CPT | Mod: HCNC,PN | Performed by: STUDENT IN AN ORGANIZED HEALTH CARE EDUCATION/TRAINING PROGRAM

## 2023-09-27 LAB
ALBUMIN SERPL BCP-MCNC: 3.6 G/DL (ref 3.5–5.2)
ALP SERPL-CCNC: 111 U/L (ref 55–135)
ALT SERPL W/O P-5'-P-CCNC: 9 U/L (ref 10–44)
ANION GAP SERPL CALC-SCNC: 15 MMOL/L (ref 8–16)
AST SERPL-CCNC: 12 U/L (ref 10–40)
BILIRUB SERPL-MCNC: 0.3 MG/DL (ref 0.1–1)
BUN SERPL-MCNC: 53 MG/DL (ref 8–23)
CALCIUM SERPL-MCNC: 9.6 MG/DL (ref 8.7–10.5)
CHLORIDE SERPL-SCNC: 96 MMOL/L (ref 95–110)
CO2 SERPL-SCNC: 26 MMOL/L (ref 23–29)
CREAT SERPL-MCNC: 3.4 MG/DL (ref 0.5–1.4)
EST. GFR  (NO RACE VARIABLE): 13.9 ML/MIN/1.73 M^2
GLUCOSE SERPL-MCNC: 104 MG/DL (ref 70–110)
POTASSIUM SERPL-SCNC: 4.2 MMOL/L (ref 3.5–5.1)
PROT SERPL-MCNC: 7.8 G/DL (ref 6–8.4)
SODIUM SERPL-SCNC: 137 MMOL/L (ref 136–145)

## 2023-09-28 ENCOUNTER — TELEPHONE (OUTPATIENT)
Dept: CARDIOLOGY | Facility: CLINIC | Age: 73
End: 2023-09-28
Payer: MEDICARE

## 2023-09-28 ENCOUNTER — DOCUMENT SCAN (OUTPATIENT)
Dept: HOME HEALTH SERVICES | Facility: HOSPITAL | Age: 73
End: 2023-09-28
Payer: MEDICARE

## 2023-09-28 NOTE — PROGRESS NOTES
Call patient   Kidneys worsened with taking the fluid pills   Hold off on taking the torsemide and metolazone for about a week  Can restart torsemide once a day only, in 1 week

## 2023-09-28 NOTE — TELEPHONE ENCOUNTER
Lvm for pt    ----- Message from Cinthia Gonzales MA sent at 9/28/2023  4:26 PM CDT -----    ----- Message -----  From: Arlene Hobbs MD  Sent: 9/28/2023   2:27 PM CDT  To: Cinthia Gonzales MA    Call patient   Kidneys worsened with taking the fluid pills   Hold off on taking the torsemide and metolazone for about a week  Can restart torsemide once a day only, in 1 week

## 2023-09-29 ENCOUNTER — PATIENT MESSAGE (OUTPATIENT)
Dept: FAMILY MEDICINE | Facility: CLINIC | Age: 73
End: 2023-09-29
Payer: MEDICARE

## 2023-09-29 ENCOUNTER — OUTPATIENT CASE MANAGEMENT (OUTPATIENT)
Dept: ADMINISTRATIVE | Facility: OTHER | Age: 73
End: 2023-09-29
Payer: MEDICARE

## 2023-09-29 ENCOUNTER — OFFICE VISIT (OUTPATIENT)
Dept: CARDIOLOGY | Facility: CLINIC | Age: 73
End: 2023-09-29
Payer: MEDICARE

## 2023-09-29 ENCOUNTER — TELEPHONE (OUTPATIENT)
Dept: CARDIOLOGY | Facility: CLINIC | Age: 73
End: 2023-09-29
Payer: MEDICARE

## 2023-09-29 ENCOUNTER — TELEPHONE (OUTPATIENT)
Dept: FAMILY MEDICINE | Facility: CLINIC | Age: 73
End: 2023-09-29
Payer: MEDICARE

## 2023-09-29 DIAGNOSIS — I10 PRIMARY HYPERTENSION: ICD-10-CM

## 2023-09-29 DIAGNOSIS — Z86.73 HISTORY OF CVA (CEREBROVASCULAR ACCIDENT): ICD-10-CM

## 2023-09-29 DIAGNOSIS — Z17.0 MALIGNANT NEOPLASM OF UPPER-OUTER QUADRANT OF RIGHT BREAST IN FEMALE, ESTROGEN RECEPTOR POSITIVE: ICD-10-CM

## 2023-09-29 DIAGNOSIS — R60.9 EDEMA, UNSPECIFIED TYPE: Primary | ICD-10-CM

## 2023-09-29 DIAGNOSIS — R06.02 SOB (SHORTNESS OF BREATH): ICD-10-CM

## 2023-09-29 DIAGNOSIS — R07.9 CHEST PAIN, UNSPECIFIED TYPE: ICD-10-CM

## 2023-09-29 DIAGNOSIS — E66.01 MORBID OBESITY WITH BMI OF 40.0-44.9, ADULT: ICD-10-CM

## 2023-09-29 DIAGNOSIS — I50.32 CHRONIC DIASTOLIC CONGESTIVE HEART FAILURE: ICD-10-CM

## 2023-09-29 DIAGNOSIS — C50.411 MALIGNANT NEOPLASM OF UPPER-OUTER QUADRANT OF RIGHT BREAST IN FEMALE, ESTROGEN RECEPTOR POSITIVE: ICD-10-CM

## 2023-09-29 DIAGNOSIS — I71.40 ABDOMINAL AORTIC ANEURYSM (AAA) WITHOUT RUPTURE, UNSPECIFIED PART: ICD-10-CM

## 2023-09-29 DIAGNOSIS — E78.5 HYPERLIPIDEMIA, UNSPECIFIED HYPERLIPIDEMIA TYPE: ICD-10-CM

## 2023-09-29 PROCEDURE — 4010F ACE/ARB THERAPY RXD/TAKEN: CPT | Mod: HCNC,CPTII,95, | Performed by: STUDENT IN AN ORGANIZED HEALTH CARE EDUCATION/TRAINING PROGRAM

## 2023-09-29 PROCEDURE — 1157F ADVNC CARE PLAN IN RCRD: CPT | Mod: HCNC,CPTII,95, | Performed by: STUDENT IN AN ORGANIZED HEALTH CARE EDUCATION/TRAINING PROGRAM

## 2023-09-29 PROCEDURE — 99214 PR OFFICE/OUTPT VISIT, EST, LEVL IV, 30-39 MIN: ICD-10-PCS | Mod: HCNC,95,, | Performed by: STUDENT IN AN ORGANIZED HEALTH CARE EDUCATION/TRAINING PROGRAM

## 2023-09-29 PROCEDURE — 4010F PR ACE/ARB THEARPY RXD/TAKEN: ICD-10-PCS | Mod: HCNC,CPTII,95, | Performed by: STUDENT IN AN ORGANIZED HEALTH CARE EDUCATION/TRAINING PROGRAM

## 2023-09-29 PROCEDURE — 1157F PR ADVANCE CARE PLAN OR EQUIV PRESENT IN MEDICAL RECORD: ICD-10-PCS | Mod: HCNC,CPTII,95, | Performed by: STUDENT IN AN ORGANIZED HEALTH CARE EDUCATION/TRAINING PROGRAM

## 2023-09-29 PROCEDURE — 99214 OFFICE O/P EST MOD 30 MIN: CPT | Mod: HCNC,95,, | Performed by: STUDENT IN AN ORGANIZED HEALTH CARE EDUCATION/TRAINING PROGRAM

## 2023-09-29 NOTE — TELEPHONE ENCOUNTER
----- Message from David Sierra RN sent at 9/29/2023 10:44 AM CDT -----  Regarding: Medication refills  Good morning,    I just spoke with Ms. Luther and she stated that she is having trouble with the refills that were sent into her pharmacy. She stated that they were sent twice but her pharmacy is stating that they did not receive them. She was wondering if they could be sent in again or send them to [address]. She is completely out of some of her medications at this time. Thank you for your time and assistance!    Kind regards,    David Sierra RN OPCM

## 2023-09-29 NOTE — PROGRESS NOTES
Subjective:   Patient ID:  Susu Luther is a 73 y.o. female who presents for follow up of No chief complaint on file.      The patient location is: home  The chief complaint leading to consultation is: follow up    Visit type: audiovisual    Face to Face time with patient: 10 min  10 minutes of total time spent on the encounter, which includes face to face time and non-face to face time preparing to see the patient (eg, review of tests), Obtaining and/or reviewing separately obtained history, Documenting clinical information in the electronic or other health record, Independently interpreting results (not separately reported) and communicating results to the patient/family/caregiver, or Care coordination (not separately reported).         Each patient to whom he or she provides medical services by telemedicine is:  (1) informed of the relationship between the physician and patient and the respective role of any other health care provider with respect to management of the patient; and (2) notified that he or she may decline to receive medical services by telemedicine and may withdraw from such care at any time.    Notes:           12/1/20  71 yo female, care establish. Prior cardiologist Dr ackerman   [facility] HTN, CVA (2009), Right breast CA, Lumpectomy 3/2019, h/o PE off OAC 2 yrs ago.  AAA, s/p transcutaneous patch by Dr. Bonds, obesity knee OA imbalanced walker dependent  C/o SOB after walking few steps and dizziness. Had vision issue 1 month ago due to uncontrolled HTN  No chest pain  Sleeps with 2 pillows  Decent appetites  Leg calf pain worse at night  No smoking/drinking  ekg today NSR LVH.    ECH normal EF, grade II DD, LAE and PAP 56 mmHG   Chest CTA negative for PE  BP high    A1c controlled    S/p Open subpectoral lymph node biopsy on the right on 12/02/2020 by Dr. Starks  Still right chest, under arm and shoudler supersensitive pain      Shortness of Breath  Associated symptoms  include leg swelling. Pertinent negatives include no abdominal pain, claudication, fever, headaches, neck pain, orthopnea, PND, rash, sputum production, syncope, vomiting or wheezing.   Chest Pain   Pertinent negatives include no abdominal pain, back pain, claudication, cough, diaphoresis, dizziness, fever, headaches, irregular heartbeat, malaise/fatigue, nausea, near-syncope, numbness, orthopnea, palpitations, PND, sputum production, syncope, vomiting or weakness.   Her past medical history is significant for CHF.   Pertinent negatives for past medical history include no seizures.   Congestive Heart Failure  Pertinent negatives include no abdominal pain, claudication, near-syncope or palpitations.     2/28/22  Patient was admitted to [facility] for shortness of breath.  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  Started on heparin drip in transition to oral anticoagulation, now on Eliquis.  Recurrent PE.  On Femara. Has another lump in breast on right side, recently seen on PET scan.   Due to TANNER, was told to stop hctz, telmisartan.  She has ran out of clonidine. Does not take the ASA, hydralazine, amlodipine.   Blood pressure normotensive.  Reports severe headaches.  Has been out of Fioricet.  Echocardiogram with normal EF  Reports shortness of breath, chest heaviness mostly right-sided.      3/14/22  Still having SOB due to PE.  No bleeding issues while on eliquis.  Chest pain is substernal to right sided.   Lasix doesn't seem to help.   A reports intermittent leg pain right > left.  DVT ultrasound in-hospital with no evidence of DVT.  Following Hematology-Oncology.  Patient does not want surgery if needed for possible breast cancer.  Denies syncope, fever, chills.         4/18/22  Comes in with daughter who lives in [place].  Concerned that she may oxygen issues and may need home oxygen. O2 98%  Reports LE swelling and SOB  Reports chest pain comes on with SOB, did  not have chest pain prior to PE  Has not been on lasix, has been held  Reports balancing issues- can do PT once symptoms improve   Denies syncope, fever, chills.       5/16/22  Has been feeling weak last couple days  Feels chest tightness with walking, also CURIEL- feels worse than before. 97% O2 in clinic   Reports dizziness after taking medications   BP low today   Says recurrent cancer may be spreading   No bleeding on eliquis   Reports orthopnea, PND.  Not feeling well- Does not want to the hospital     Denies syncope.      6/13/22  Not feeling well today  Reports chest pain and SOB worsening over the last 2 weeks   Ddimer trending, downTroponin neg and BNP nl on 5/16/22  EKG today without significant abnormalities   Reports recent diagnosis of breast cancer is progressing  Reports right-sided arm weakness  Patient has been increasingly stressed    Denies syncope, lower extremity swelling.      7/6/2022   went to emergency room 6/13/2022, was worked up for stroke  MRI brain negative  Repeat CTA chest without PE, right-sided mass 6 cm, stable  All blood pressure medications.  Due to hypotension  Started taking Lasix today  Has significant lower extremity pain bilateral, reports blue feet at times      8/9/22  Virtual visit  Ambulates with walker   Had a fall recently due to syncopal episode, went to urgent care, negative workup per patient  Syncopal event occurred while standing up  Last visit Lasix was increased, patient reports increased thirst and water intake  Has also been taking carvedilol, reports low blood pressure  Chest pain worsened after syncopal event, has bruising on her body      9/7/22  Reports leg swelling, left  Has been taking eliquis also   Restarted taking lasix 2 weeks ago  Recently started on lyrica   Still wearing cardiac monitor  Still having SOB and chest discomfort  U/S arterial w/o significant stenosis   BP elevated, high at home  Lost 8 lbs since 9/1      10/12/22  Virtual visit  Doing  well, still getting chest pain   Still has shortness of breath but has improved   Was able to go to a football game without any issues   DVT ultrasound without thrombus   Has been treated for gout, improvement of toe pain  Has been having intermittent blood pressure elevated readings at home        2/20/23  Stress test normal   BP elevated now  Has been more tired  Drinking lots of water  Chest pain has improved with imdur  SOB stable   Has chronic pain and chronic fatigue         3/21/23  Patient was recently admitted to the hospital for right arm weakness, TANNER, chest pain, shortness of breath   MRI/CT scan of the brain were unremarkable   CTA did not show PE   Had elevated creatinine, renal ultrasound did not show arterial stenosis  BP noted to be significantly elevated   Blood pressure meds were changed, patient only taking Entresto and amlodipine   Coreg was stopped  Reports right-sided breast lump/mass currently being investigated    Today, reports still having shortness of breath  Waiting to have ultrasound done for right breast lump  Blood pressure stable today      4/25/23  Virtual visit   Continues to have intermittent chest pain, shortness of breath   Has not fallen since last visit   Blood pressure has been stable   Metanephrine levels are elevated  No bleeding on Eliquis      9/18/23  Virtual visit   Was admitted to [facility] 7/23 for chest pain and recurrent falls and worsening swelling in her legs  Lasix was switched to torsemide  Continues to have leg pain and swelling  Has been seeing Maria Isabel in [facility]  Currently taking torsemide 20 mg b.i.d.  No improvement in her leg pain and leg swelling since discharge from the hospital   Recent DVT ultrasound of right leg with no clot  Has been wearing compression stockings but difficult recently due to leg swelling      9/25/23  Virtual visit   Took metolazone once  Taking torsemide 40 mg b.i.d.  Swelling improving per patient but not a lot pain   CKD mildly  worsening on recent labs        9/29/23  Virtual visit  Worsening renal function with taking increased dose of torsemide and metolazone x 2 doses   Swelling has improved and patient now able to walk w/o pain  Was told to hold diuretics for 1 week and have f/u labs  No SOB  Has not gotten contacted by lymphedema clinic at [facility]          EKG 2/20/23 NSR, PVCs, LAD, minimal LVH  EKG 6/13/22 NSR, LAD, LVH, 93 bpm, qtc 455 ms  EKG 5/16/22 SB, incomplete RBBB, LVH, septal infarct, qtc 422 ms   Echo 2/28/22  The left ventricle is normal in size with concentric hypertrophy and normal systolic function.  The estimated ejection fraction is 60%.  Normal left ventricular diastolic function.  Normal right ventricular size with normal right ventricular systolic function.  Mild to moderate tricuspid regurgitation.  Normal central venous pressure (3 mmHg).  The estimated PA systolic pressure is 36 mmHg.       Echo 2019  CONCLUSIONS     1 - Mild left atrial enlargement.     2 - Concentric hypertrophy.     3 - No wall motion abnormalities.     4 - Normal left ventricular systolic function (EF 60-65%).     5 - Impaired LV relaxation, elevated LAP (grade 2 diastolic dysfunction).     6 - Normal right ventricular systolic function .     7 - The estimated PA systolic pressure is 36 mmHg.     8 - Mild tricuspid regurgitation.        Past Medical History:   Diagnosis Date    Cataract     Bilateral    Chronic diastolic congestive heart failure 08/25/2020    Dizziness 03/12/2023    Encounter for blood transfusion     History of repair of aneurysm of abdominal aorta using endovascular stent graft     DR Bonds    Hx of psychiatric care     Hypertension     Major depressive disorder, single episode, moderate with anxious distress 01/14/2020    Malignant neoplasm of upper-outer quadrant of right breast in female, estrogen receptor positive 03/14/2019    radiation    Psychiatric problem     Sleep apnea     Sleep difficulties     Stroke 2009    no  residual defect    Therapy        Past Surgical History:   Procedure Laterality Date    APPENDECTOMY      AXILLARY NODE DISSECTION Right 2020    Procedure: LYMPHADENECTOMY, AXILLARY;  Surgeon: Artemio Starks MD;  Location: [facility] OR;  Service: General;  Laterality: Right;    BIOPSY OF AXILLARY NODE Right 2021    Procedure: BIOPSY, LYMPH NODE, AXILLARY;  Surgeon: Artemio Sutton MD;  Location: [address];  Service: General;  Laterality: Right;    BREAST BIOPSY      2019    BREAST LUMPECTOMY Right     2019     SECTION      x 1    OOPHORECTOMY      right     SENTINEL LYMPH NODE BIOPSY Right 2019    Procedure: BIOPSY, LYMPH NODE, SENTINEL;  Surgeon: Artemio Starks MD;  Location: [facility];  Service: General;  Laterality: Right;    splenic artery aneurysm repair      TONSILLECTOMY         Social History     Tobacco Use    Smoking status: Never     Passive exposure: Past    Smokeless tobacco: Never   Substance Use Topics    Alcohol use: No    Drug use: No       Family History   Problem Relation Age of Onset    Diabetes Maternal Grandmother     Diabetes Maternal Grandfather     Diabetes Mother     Hypertension Mother     Sickle cell anemia Daughter     Breast cancer Neg Hx     Colon cancer Neg Hx     Ovarian cancer Neg Hx          Review of Systems   Constitutional: Negative for decreased appetite, diaphoresis, fever, malaise/fatigue and night sweats.   HENT:  Negative for nosebleeds.    Eyes:  Negative for blurred vision and double vision.   Cardiovascular:  Positive for leg swelling. Negative for claudication, irregular heartbeat, near-syncope, orthopnea, palpitations, paroxysmal nocturnal dyspnea and syncope.   Respiratory:  Negative for cough, sleep disturbances due to breathing, snoring, sputum production and wheezing.    Endocrine: Negative for cold intolerance and polyuria.   Hematologic/Lymphatic: Does not bruise/bleed easily.   Skin:  Negative for rash.   Musculoskeletal:   Negative for back pain, falls, joint pain, joint swelling and neck pain.        Right chest breast and shoulder pain   Gastrointestinal:  Negative for abdominal pain, heartburn, nausea and vomiting.   Genitourinary:  Negative for dysuria, frequency and hematuria.   Neurological:  Negative for difficulty with concentration, dizziness, focal weakness, headaches, light-headedness, numbness, seizures and weakness.   Psychiatric/Behavioral:  Negative for depression, memory loss and substance abuse. The patient does not have insomnia.    Allergic/Immunologic: Negative for HIV exposure and hives.     There were no vitals filed for this visit.          Objective:   Physical Exam  Constitutional:       Comments: Mild distress.    HENT:      Head: Normocephalic.   Eyes:      Pupils: Pupils are equal, round, and reactive to light.   Neck:      Thyroid: No thyromegaly.      Vascular: Normal carotid pulses. No carotid bruit or JVD.   Cardiovascular:      Rate and Rhythm: Normal rate and regular rhythm. No extrasystoles are present.     Chest Wall: PMI is not displaced.      Pulses: Normal pulses.      Heart sounds: Normal heart sounds. No murmur heard.     No gallop. No S3 sounds.   Pulmonary:      Effort: No respiratory distress.      Breath sounds: Normal breath sounds. No stridor.   Abdominal:      General: Bowel sounds are normal.      Palpations: Abdomen is soft.      Tenderness: There is no abdominal tenderness. There is no rebound.   Musculoskeletal:         General: Swelling present. Normal range of motion.      Comments: Tender to palpitation   Skin:     Findings: No rash.   Neurological:      Mental Status: She is alert and oriented to person, place, and time.   Psychiatric:         Behavior: Behavior normal.         Lab Results   Component Value Date    CHOL 234 (H) 02/28/2023    CHOL 229 (H) 04/18/2022    CHOL 221 (H) 10/19/2020     Lab Results   Component Value Date    HDL 44 02/28/2023    HDL 45 04/18/2022    HDL  55 10/19/2020     Lab Results   Component Value Date    LDLCALC 151.8 02/28/2023    LDLCALC 150.4 04/18/2022    LDLCALC 137.4 10/19/2020     Lab Results   Component Value Date    TRIG 191 (H) 02/28/2023    TRIG 168 (H) 04/18/2022    TRIG 143 10/19/2020     Lab Results   Component Value Date    CHOLHDL 18.8 (L) 02/28/2023    CHOLHDL 19.7 (L) 04/18/2022    CHOLHDL 24.9 10/19/2020       Chemistry        Component Value Date/Time     09/26/2023 1351    K 4.2 09/26/2023 1351    CL 96 09/26/2023 1351    CO2 26 09/26/2023 1351    BUN 53 (H) 09/26/2023 1351    CREATININE 3.4 (H) 09/26/2023 1351     09/26/2023 1351        Component Value Date/Time    CALCIUM 9.6 09/26/2023 1351    ALKPHOS 111 09/26/2023 1351    AST 12 09/26/2023 1351    ALT 9 (L) 09/26/2023 1351    BILITOT 0.3 09/26/2023 1351    ESTGFRAFRICA 37 (A) 07/06/2022 1312    EGFRNONAA 32 (A) 07/06/2022 1312          Lab Results   Component Value Date    HGBA1C 5.9 (H) 05/18/2019     Lab Results   Component Value Date    TSH 1.691 03/10/2023     Lab Results   Component Value Date    INR 1.1 03/11/2023    INR 0.9 02/15/2022    INR 1.0 11/10/2020     Lab Results   Component Value Date    WBC 9.05 08/03/2023    HGB 11.8 (L) 08/03/2023    HCT 37.2 08/03/2023    MCV 81 (L) 08/03/2023     08/03/2023     BMP  Sodium   Date Value Ref Range Status   09/26/2023 137 136 - 145 mmol/L Final     Potassium   Date Value Ref Range Status   09/26/2023 4.2 3.5 - 5.1 mmol/L Final     Chloride   Date Value Ref Range Status   09/26/2023 96 95 - 110 mmol/L Final     CO2   Date Value Ref Range Status   09/26/2023 26 23 - 29 mmol/L Final     BUN   Date Value Ref Range Status   09/26/2023 53 (H) 8 - 23 mg/dL Final     Creatinine   Date Value Ref Range Status   09/26/2023 3.4 (H) 0.5 - 1.4 mg/dL Final     Calcium   Date Value Ref Range Status   09/26/2023 9.6 8.7 - 10.5 mg/dL Final     Anion Gap   Date Value Ref Range Status   09/26/2023 15 8 - 16 mmol/L Final     eGFR if     Date Value Ref Range Status   07/06/2022 37 (A) >60 mL/min/1.73 m^2 Final     eGFR if non    Date Value Ref Range Status   07/06/2022 32 (A) >60 mL/min/1.73 m^2 Final     Comment:     Calculation used to obtain the estimated glomerular filtration  rate (eGFR) is the CKD-EPI equation.        BNP  @LABRCNTIP(BNP,BNPTRIAGEBLO)@  @LABRCNTIP(troponini)@  CrCl cannot be calculated (Unknown ideal weight.).  No results found in the last 24 hours.  No results found in the last 24 hours.  No results found in the last 24 hours.    Assessment:      1. Edema, unspecified type    2. Chronic diastolic congestive heart failure    3. Primary hypertension    4. Hyperlipidemia, unspecified hyperlipidemia type    5. History of CVA (cerebrovascular accident)    6. Morbid obesity with BMI of 40.0-44.9, adult    7. SOB (shortness of breath)    8. Abdominal aortic aneurysm (AAA) without rupture, unspecified part    9. Malignant neoplasm of upper-outer quadrant of right breast in female, estrogen receptor positive    10. Chest pain, unspecified type          Plan:     Left leg swelling/edema- chronic  Hold torsemide for 1 week, then restart at torsemide 40 daiy   Stop Metolazone   Venous ultrasound 10/22 and 9/23 without DVT  Refer to l[facility]hedema clinic at [facility]- pending   Check labs in 2 weeks     Hypertension  Stable but significant fluctuations  Carvedilol, hydralazine stopped  Continue Entresto, amlodipine   Elevated metanephrines- will re-refer if fluctuations continue      Chest pain/shortness of breath-stable  CTA 3/23 did not show PE   Stress test 1/23 normal stress test     Syncope- resolved   Possibly due to dehydration  14 day monitor- 7.27% PACs, essentially asymptomatic    History of right breast cancer  Follows with Hematology-Oncology    Diastolic heart failure  Echo 3/23 with normal EF, mild-mod TR    Right leg pain-stable  DVT ultrasound 10/22 without evidence of DVT   Normal ABIs  3/22  Arterial ultrasound 8/22 without significant stenosis    Recurrent pulmonary embolus   Recurrent PE as of 2/22  Continue OAC    History of CVA  Carotid ultrasound 6/22 no significant stenosis, MRI brain 6/22 no abnormality  Currently not on aspirin as she is taking Eliquis  Continue statin    HLD   as of 2/23  Continue statin    AAA s/p transcutaneous patch  Stable    Morbid obesity, BMI > 40  Low-salt, low-fat diet  Exercise as tolerated      All labs and cardiac procedures reviewed.      Return to clinic 3-4 weeks    Arlene Hobbs MD  Cardiology Staff

## 2023-09-29 NOTE — TELEPHONE ENCOUNTER
Spoke with patient gave her lab results       Kidneys worsened with taking the fluid pills   Hold off on taking the torsemide and metolazone for about a week  Can restart torsemide once a day only, in 1 week      ----- Message from David Sierra RN sent at 9/29/2023 10:41 AM CDT -----  Regarding: Voicemail  Good morning,    I just spoke with Ms. Luther and asked if she received the voicemail that was left for her yesterday. She stated that she is having some trouble with her phone and did not get the voicemail. I did read to her what the instructions where but she was wondering if the office could call back just to make sure she understood everything. Thank you for your time and assistance!    Kind regards,    David Sierra RN [facility]

## 2023-09-29 NOTE — TELEPHONE ENCOUNTER
Tried contacting pt to see what all meds she needed sent to [facility]. Left messge, Sent portal message as well

## 2023-10-03 ENCOUNTER — PATIENT MESSAGE (OUTPATIENT)
Dept: FAMILY MEDICINE | Facility: CLINIC | Age: 73
End: 2023-10-03
Payer: MEDICARE

## 2023-10-06 ENCOUNTER — OFFICE VISIT (OUTPATIENT)
Dept: FAMILY MEDICINE | Facility: CLINIC | Age: 73
End: 2023-10-06
Payer: MEDICARE

## 2023-10-06 DIAGNOSIS — J30.2 SEASONAL ALLERGIES: ICD-10-CM

## 2023-10-06 DIAGNOSIS — F32.1 MAJOR DEPRESSIVE DISORDER, SINGLE EPISODE, MODERATE WITH ANXIOUS DISTRESS: ICD-10-CM

## 2023-10-06 DIAGNOSIS — R23.2 HOT FLASHES: ICD-10-CM

## 2023-10-06 DIAGNOSIS — I10 HYPERTENSION, UNSPECIFIED TYPE: ICD-10-CM

## 2023-10-06 DIAGNOSIS — F54 PSYCHOLOGICAL FACTORS AFFECTING MEDICAL CONDITION: ICD-10-CM

## 2023-10-06 DIAGNOSIS — M79.605 PAIN IN BOTH LOWER EXTREMITIES: Primary | ICD-10-CM

## 2023-10-06 DIAGNOSIS — Z17.0 MALIGNANT NEOPLASM OF UPPER-OUTER QUADRANT OF RIGHT BREAST IN FEMALE, ESTROGEN RECEPTOR POSITIVE: ICD-10-CM

## 2023-10-06 DIAGNOSIS — C50.411 MALIGNANT NEOPLASM OF UPPER-OUTER QUADRANT OF RIGHT BREAST IN FEMALE, ESTROGEN RECEPTOR POSITIVE: ICD-10-CM

## 2023-10-06 DIAGNOSIS — M79.604 PAIN IN BOTH LOWER EXTREMITIES: Primary | ICD-10-CM

## 2023-10-06 DIAGNOSIS — G89.29 CHRONIC NONINTRACTABLE HEADACHE, UNSPECIFIED HEADACHE TYPE: ICD-10-CM

## 2023-10-06 DIAGNOSIS — E78.5 HYPERLIPIDEMIA, UNSPECIFIED HYPERLIPIDEMIA TYPE: ICD-10-CM

## 2023-10-06 DIAGNOSIS — R51.9 CHRONIC NONINTRACTABLE HEADACHE, UNSPECIFIED HEADACHE TYPE: ICD-10-CM

## 2023-10-06 DIAGNOSIS — I26.99 ACUTE PULMONARY EMBOLISM, UNSPECIFIED PULMONARY EMBOLISM TYPE, UNSPECIFIED WHETHER ACUTE COR PULMONALE PRESENT: ICD-10-CM

## 2023-10-06 PROCEDURE — 4010F PR ACE/ARB THEARPY RXD/TAKEN: ICD-10-PCS | Mod: HCNC,CPTII,95, | Performed by: NURSE PRACTITIONER

## 2023-10-06 PROCEDURE — 1160F PR REVIEW ALL MEDS BY PRESCRIBER/CLIN PHARMACIST DOCUMENTED: ICD-10-PCS | Mod: HCNC,CPTII,95, | Performed by: NURSE PRACTITIONER

## 2023-10-06 PROCEDURE — 1157F PR ADVANCE CARE PLAN OR EQUIV PRESENT IN MEDICAL RECORD: ICD-10-PCS | Mod: HCNC,CPTII,95, | Performed by: NURSE PRACTITIONER

## 2023-10-06 PROCEDURE — 99214 OFFICE O/P EST MOD 30 MIN: CPT | Mod: HCNC,95,, | Performed by: NURSE PRACTITIONER

## 2023-10-06 PROCEDURE — 1159F PR MEDICATION LIST DOCUMENTED IN MEDICAL RECORD: ICD-10-PCS | Mod: HCNC,CPTII,95, | Performed by: NURSE PRACTITIONER

## 2023-10-06 PROCEDURE — 99214 PR OFFICE/OUTPT VISIT, EST, LEVL IV, 30-39 MIN: ICD-10-PCS | Mod: HCNC,95,, | Performed by: NURSE PRACTITIONER

## 2023-10-06 PROCEDURE — 1159F MED LIST DOCD IN RCRD: CPT | Mod: HCNC,CPTII,95, | Performed by: NURSE PRACTITIONER

## 2023-10-06 PROCEDURE — 1160F RVW MEDS BY RX/DR IN RCRD: CPT | Mod: HCNC,CPTII,95, | Performed by: NURSE PRACTITIONER

## 2023-10-06 PROCEDURE — 4010F ACE/ARB THERAPY RXD/TAKEN: CPT | Mod: HCNC,CPTII,95, | Performed by: NURSE PRACTITIONER

## 2023-10-06 PROCEDURE — 1157F ADVNC CARE PLAN IN RCRD: CPT | Mod: HCNC,CPTII,95, | Performed by: NURSE PRACTITIONER

## 2023-10-06 RX ORDER — BUPROPION HYDROCHLORIDE 150 MG/1
150 TABLET ORAL DAILY
Qty: 30 TABLET | Refills: 11 | Status: SHIPPED | OUTPATIENT
Start: 2023-10-06 | End: 2023-10-14 | Stop reason: SDUPTHER

## 2023-10-06 RX ORDER — LEVOCETIRIZINE DIHYDROCHLORIDE 5 MG/1
5 TABLET, FILM COATED ORAL NIGHTLY
Qty: 90 TABLET | Refills: 0 | Status: SHIPPED | OUTPATIENT
Start: 2023-10-06 | End: 2023-10-14 | Stop reason: SDUPTHER

## 2023-10-06 RX ORDER — ZOLPIDEM TARTRATE 5 MG/1
5 TABLET ORAL NIGHTLY PRN
Qty: 90 TABLET | Refills: 1 | Status: SHIPPED | OUTPATIENT
Start: 2023-10-06 | End: 2023-10-14 | Stop reason: SDUPTHER

## 2023-10-06 RX ORDER — TRAMADOL HYDROCHLORIDE 100 MG/1
100 TABLET, EXTENDED RELEASE ORAL DAILY
Qty: 30 TABLET | Refills: 2 | Status: SHIPPED | OUTPATIENT
Start: 2023-10-06 | End: 2023-10-14 | Stop reason: SDUPTHER

## 2023-10-06 RX ORDER — RANOLAZINE 1000 MG/1
1000 TABLET, EXTENDED RELEASE ORAL 2 TIMES DAILY
Qty: 180 TABLET | Refills: 3 | Status: SHIPPED | OUTPATIENT
Start: 2023-10-06 | End: 2023-10-14 | Stop reason: SDUPTHER

## 2023-10-06 RX ORDER — GABAPENTIN 400 MG/1
400 CAPSULE ORAL 3 TIMES DAILY
Qty: 120 CAPSULE | Refills: 0 | Status: SHIPPED | OUTPATIENT
Start: 2023-10-06 | End: 2023-10-14 | Stop reason: SDUPTHER

## 2023-10-06 RX ORDER — ALPRAZOLAM 0.5 MG/1
0.5 TABLET ORAL 2 TIMES DAILY PRN
Qty: 180 TABLET | Refills: 0 | Status: SHIPPED | OUTPATIENT
Start: 2023-10-06 | End: 2023-10-14 | Stop reason: SDUPTHER

## 2023-10-06 RX ORDER — HYDRALAZINE HYDROCHLORIDE 25 MG/1
25 TABLET, FILM COATED ORAL 3 TIMES DAILY
Qty: 60 TABLET | Refills: 0 | Status: SHIPPED | OUTPATIENT
Start: 2023-10-06 | End: 2023-10-14 | Stop reason: SDUPTHER

## 2023-10-06 RX ORDER — AMLODIPINE BESYLATE 5 MG/1
5 TABLET ORAL DAILY
Qty: 30 TABLET | Refills: 11 | Status: SHIPPED | OUTPATIENT
Start: 2023-10-06 | End: 2023-10-14 | Stop reason: SDUPTHER

## 2023-10-06 RX ORDER — BUTALBITAL, ACETAMINOPHEN AND CAFFEINE 50; 325; 40 MG/1; MG/1; MG/1
TABLET ORAL
Qty: 30 TABLET | Refills: 1 | Status: SHIPPED | OUTPATIENT
Start: 2023-10-06 | End: 2023-10-14 | Stop reason: SDUPTHER

## 2023-10-06 RX ORDER — LETROZOLE 2.5 MG/1
2.5 TABLET, FILM COATED ORAL DAILY
Qty: 90 TABLET | Refills: 1 | Status: SHIPPED | OUTPATIENT
Start: 2023-10-06 | End: 2023-10-10

## 2023-10-06 RX ORDER — ATORVASTATIN CALCIUM 20 MG/1
20 TABLET, FILM COATED ORAL DAILY
Qty: 90 TABLET | Refills: 3 | Status: SHIPPED | OUTPATIENT
Start: 2023-10-06 | End: 2023-10-14 | Stop reason: SDUPTHER

## 2023-10-06 RX ORDER — SACUBITRIL AND VALSARTAN 24; 26 MG/1; MG/1
1 TABLET, FILM COATED ORAL 2 TIMES DAILY
Qty: 60 TABLET | Refills: 2 | Status: SHIPPED | OUTPATIENT
Start: 2023-10-06 | End: 2023-10-14 | Stop reason: SDUPTHER

## 2023-10-06 RX ORDER — CARVEDILOL 3.12 MG/1
3.12 TABLET ORAL 2 TIMES DAILY WITH MEALS
Qty: 90 TABLET | Refills: 0 | Status: SHIPPED | OUTPATIENT
Start: 2023-10-06 | End: 2023-10-14 | Stop reason: SDUPTHER

## 2023-10-06 RX ORDER — RANOLAZINE 1000 MG/1
1000 TABLET, EXTENDED RELEASE ORAL 2 TIMES DAILY
Qty: 180 TABLET | Refills: 3 | Status: SHIPPED | OUTPATIENT
Start: 2023-10-06 | End: 2023-10-06 | Stop reason: SDUPTHER

## 2023-10-06 NOTE — PROGRESS NOTES
Susu Luther  10/06/2023  [id number]      The patient location is: home  The chief complaint leading to consultation is: medication refills and leg swelling   Visit type: Virtual visit with synchronous audio and video  Total time spent with patient: 14 min  Each patient to whom he or she provides medical services by telemedicine is:  (1) informed of the relationship between the physician and patient and the respective role of any other health care provider with respect to management of the patient; and (2) notified that he or she may decline to receive medical services by telemedicine and may withdraw from such care at any time.        Chief Complaint:      History of Present Illness:    74 yo female presents vis telemedicine with co continued bilateral lower leg swelling despite taking her fluid pill as directed.  Pt is also requesting medication refills for all her meds.  Amlodipine decreased to 5 mg.  Pt reports she has been evaluated by Dr. Hobbs for leg swelling. MD would like to evaluate Mrs. Cristobal's kidney function.    Review of Systems   HENT:  Negative for hearing loss.    Eyes:  Negative for discharge.   Respiratory:  Negative for wheezing.    Cardiovascular:  Positive for leg swelling. Negative for chest pain and palpitations.   Gastrointestinal:  Negative for blood in stool, constipation, diarrhea and vomiting.   Genitourinary:  Negative for dysuria and hematuria.   Musculoskeletal:  Negative for neck pain.   Neurological:  Positive for headaches. Negative for weakness.   Endo/Heme/Allergies:  Negative for polydipsia.         History:  Past Medical History:   Diagnosis Date    Cataract     Bilateral    Chronic diastolic congestive heart failure 08/25/2020    Dizziness 03/12/2023    Encounter for blood transfusion     History of repair of aneurysm of abdominal aorta using endovascular stent graft     DR Bonds    Hx of psychiatric care     Hypertension     Major depressive disorder, single  episode, moderate with anxious distress 2020    Malignant neoplasm of upper-outer quadrant of right breast in female, estrogen receptor positive 2019    radiation    Psychiatric problem     Sleep apnea     Sleep difficulties     Stroke 2009    no residual defect    Therapy      Past Surgical History:   Procedure Laterality Date    APPENDECTOMY      AXILLARY NODE DISSECTION Right 2020    Procedure: LYMPHADENECTOMY, AXILLARY;  Surgeon: Artemio Starks MD;  Location: [facility];  Service: General;  Laterality: Right;    BIOPSY OF AXILLARY NODE Right 2021    Procedure: BIOPSY, LYMPH NODE, AXILLARY;  Surgeon: Artemio Sutton MD;  Location: [address];  Service: General;  Laterality: Right;    BREAST BIOPSY      2019    BREAST LUMPECTOMY Right     2019     SECTION      x 1    OOPHORECTOMY      right     SENTINEL LYMPH NODE BIOPSY Right 2019    Procedure: BIOPSY, LYMPH NODE, SENTINEL;  Surgeon: Artemio Starks MD;  Location: [facility];  Service: General;  Laterality: Right;    splenic artery aneurysm repair      TONSILLECTOMY         Family History   Problem Relation Age of Onset    Diabetes Maternal Grandmother     Diabetes Maternal Grandfather     Diabetes Mother     Hypertension Mother     Sickle cell anemia Daughter     Breast cancer Neg Hx     Colon cancer Neg Hx     Ovarian cancer Neg Hx      Social History     Socioeconomic History    Marital status:      Spouse name: Campos Luther    Number of children: 2   Tobacco Use    Smoking status: Never     Passive exposure: Past    Smokeless tobacco: Never   Substance and Sexual Activity    Alcohol use: No    Drug use: No    Sexual activity: Yes     Partners: Male     Birth control/protection: Post-menopausal     Social Determinants of Health     Financial Resource Strain: Low Risk  (2023)    Overall Financial Resource Strain (CARDIA)     Difficulty of Paying Living Expenses: Not hard at all   Food Insecurity: No  Food Insecurity (8/23/2023)    Hunger Vital Sign     Worried About Running Out of Food in the Last Year: Never true     Ran Out of Food in the Last Year: Never true   Transportation Needs: No Transportation Needs (8/23/2023)    PRAPARE - Transportation     Lack of Transportation (Medical): No     Lack of Transportation (Non-Medical): No   Physical Activity: Insufficiently Active (8/23/2023)    Exercise Vital Sign     Days of Exercise per Week: 2 days     Minutes of Exercise per Session: 30 min   Stress: Stress Concern Present (8/23/2023)    [place] [employer] - Occupational Stress Questionnaire     Feeling of Stress : To some extent   Social Connections: Socially Integrated (8/23/2023)    Social Connection and Isolation Panel [NHANES]     Frequency of Communication with Friends and Family: More than three times a week     Frequency of Social Gatherings with Friends and Family: Once a week     Attends Bahai Services: More than 4 times per year     Active Member of Clubs or Organizations: Yes     Attends Club or Organization Meetings: More than 4 times per year     Marital Status:    Housing Stability: Low Risk  (8/23/2023)    Housing Stability Vital Sign     Unable to Pay for Housing in the Last Year: No     Number of Places Lived in the Last Year: 1     Unstable Housing in the Last Year: No     Patient Active Problem List   Diagnosis    NILS (obstructive sleep apnea)    Malignant neoplasm of upper-outer quadrant of right breast in female, estrogen receptor positive    Hypertension    Class 3 severe obesity in adult    Headache    Circadian rhythm sleep disorder    Nocturnal hypoxemia    Major depressive disorder, recurrent episode, moderate with anxious distress    Lymphadenopathy    Porcelain gallbladder    Axillary mass    Splenic artery aneurysm    Chronic kidney disease, stage 3a    Acute pulmonary embolism    Primary osteoarthritis of right hip    History of pulmonary embolism     Multiple thyroid nodules    History of prediabetes    Neuropathic pain    Neoplasm related pain    Chronic diastolic congestive heart failure    Anemia    Mixed incontinence     Review of patient's allergies indicates:   Allergen Reactions    Pcn [penicillins] Hives       The following were reviewed at this visit: active problem list, medication list, allergies, family history, social history, and health maintenance.    Medications:  Current Outpatient Medications on File Prior to Visit   Medication Sig Dispense Refill    cholecalciferol, vitamin D3, 1,250 mcg (50,000 unit) capsule Take 1 capsule (50,000 Units total) by mouth once a week. 12 capsule 0    cloNIDine (CATAPRES) 0.1 MG tablet Take 2 tablets (0.2 mg total) by mouth 2 (two) times daily as needed (BP>170/95). 80 tablet 1    diclofenac sodium (VOLTAREN) 1 % Gel Apply 2 grams topically once daily. 400 g 1    LIDOcaine (LIDODERM) 5 % Place 2 patches onto the skin once daily. Remove & Discard patch within 12 hours or as directed by MD Conner patch 1    metOLazone (ZAROXOLYN) 2.5 MG tablet Take 1 tablet 30 minutes before taking torsemide only once until further directed by your doctor. 10 tablet 3    multivitamin (THERAGRAN) per tablet Take 1 tablet by mouth once daily.      oxyCODONE-acetaminophen (PERCOCET) 7.5-325 mg per tablet Take 1 tablet by mouth every 6 (six) hours as needed for Pain. 60 tablet 0    tamsulosin (FLOMAX) 0.4 mg Cap Take 1 capsule (0.4 mg total) by mouth once daily. 30 capsule 0    torsemide (DEMADEX) 20 MG Tab Take 2 tablets (40 mg total) by mouth 2 (two) times a day. 120 tablet 6    [DISCONTINUED] ALPRAZolam (XANAX) 0.5 MG tablet Take 1 tablet (0.5 mg total) by mouth 2 (two) times daily as needed for Anxiety. 180 tablet 0    [DISCONTINUED] ALPRAZolam (XANAX) 0.5 MG tablet Take 1 tablet by mouth twice daily as needed 30 tablet 0    [DISCONTINUED] amLODIPine (NORVASC) 10 MG tablet Take 1 tablet (10 mg total) by mouth once daily. 90 tablet 3     [DISCONTINUED] apixaban (ELIQUIS) 5 mg Tab Take 1 tablet (5 mg total) by mouth 2 (two) times daily. 180 tablet 1    [DISCONTINUED] atorvastatin (LIPITOR) 20 MG tablet Take 1 tablet (20 mg total) by mouth once daily. 90 tablet 3    [DISCONTINUED] buPROPion (WELLBUTRIN XL) 150 MG TB24 tablet Take 1 tablet (150 mg total) by mouth once daily. 30 tablet 11    [DISCONTINUED] butalbital-acetaminophen-caffeine -40 mg (FIORICET, ESGIC) -40 mg per tablet TAKE 1 TABLET DAILY AS NEEDED FOR PAIN OR HEADACHES. 30 tablet 1    [DISCONTINUED] butalbital-acetaminophen-caffeine -40 mg (FIORICET, ESGIC) -40 mg per tablet TAKE 1 TABLET DAILY AS NEEDED FOR PAIN OR HEADACHES. 30 tablet 1    [DISCONTINUED] carvediloL (COREG) 3.125 MG tablet Take 3.125 mg by mouth 2 (two) times daily with meals.      [DISCONTINUED] gabapentin (NEURONTIN) 400 MG capsule Take 1 capsule (400 mg total) by mouth 3 (three) times daily. 120 capsule 0    [DISCONTINUED] hydrALAZINE (APRESOLINE) 25 MG tablet Take 1 tablet (25 mg total) by mouth 3 (three) times daily. 60 tablet 0    [DISCONTINUED] letrozole (FEMARA) 2.5 mg Tab Take 1 tablet (2.5 mg total) by mouth once daily. 90 tablet 1    [DISCONTINUED] levocetirizine (XYZAL) 5 MG tablet Take 1 tablet (5 mg total) by mouth every evening. 90 tablet 0    [DISCONTINUED] ranolazine (RANEXA) 1,000 mg Tb12 Take 1 tablet (1,000 mg total) by mouth 2 (two) times daily. 180 tablet 3    [DISCONTINUED] traMADoL (ULTRAM) 50 mg tablet Take 1 tablet by mouth every 6 hours as needed 28 tablet 0    [DISCONTINUED] zolpidem (AMBIEN) 5 MG Tab Take 1 tablet (5 mg total) by mouth nightly as needed (sleep). 90 tablet 1     No current facility-administered medications on file prior to visit.       Exam:  Wt Readings from Last 3 Encounters:   08/25/23 (!) 143.8 kg (317 lb)   08/25/23 (!) 143.1 kg (315 lb 7.7 oz)   08/23/23 (!) 144.2 kg (317 lb 14.5 oz)     Temp Readings from Last 3 Encounters:   08/23/23 97 °F (36.1  °C)   08/07/23 97.9 °F (36.6 °C) (Tympanic)   08/03/23 98.1 °F (36.7 °C) (Temporal)     BP Readings from Last 3 Encounters:   08/31/23 (!) 133/58   08/25/23 100/60   08/23/23 112/68     Pulse Readings from Last 3 Encounters:   08/25/23 70   08/23/23 74   08/07/23 73     There is no height or weight on file to calculate BMI.      @ROS@    Physical Exam  Vitals and nursing note reviewed.   Constitutional:       Appearance: Normal appearance.   Eyes:      Conjunctiva/sclera: Conjunctivae normal.   Pulmonary:      Effort: Pulmonary effort is normal.   Musculoskeletal:      Right lower leg: 3+ Pitting Edema present.      Left lower leg: 3+ Pitting Edema present.   Neurological:      General: No focal deficit present.      Mental Status: She is alert and oriented to person, place, and time.   Psychiatric:         Mood and Affect: Mood normal.         Behavior: Behavior normal.         Thought Content: Thought content normal.         Judgment: Judgment normal.         Laboratory Reviewed ({Yes)  Lab Results   Component Value Date    WBC 9.05 08/03/2023    HGB 11.8 (L) 08/03/2023    HCT 37.2 08/03/2023     08/03/2023    CHOL 234 (H) 02/28/2023    TRIG 191 (H) 02/28/2023    HDL 44 02/28/2023    ALT 9 (L) 09/26/2023    AST 12 09/26/2023     09/26/2023    K 4.2 09/26/2023    CL 96 09/26/2023    CREATININE 3.4 (H) 09/26/2023    BUN 53 (H) 09/26/2023    CO2 26 09/26/2023    TSH 1.691 03/10/2023    INR 1.1 03/11/2023    HGBA1C 5.9 (H) 05/18/2019     Decreased amlodipine due to bilateral leg swelling     Diagnoses and all orders for this visit:    Pain in both lower extremities  -     gabapentin (NEURONTIN) 400 MG capsule; Take 1 capsule (400 mg total) by mouth 3 (three) times daily.  -     traMADoL (ULTRAM-ER) 100 MG Tb24; Take 1 tablet (100 mg total) by mouth once daily.    Major depressive disorder, single episode, moderate with anxious distress  -     ALPRAZolam (XANAX) 0.5 MG tablet; Take 1 tablet (0.5 mg total)  by mouth 2 (two) times daily as needed for Anxiety.  -     buPROPion (WELLBUTRIN XL) 150 MG TB24 tablet; Take 1 tablet (150 mg total) by mouth once daily.    Psychological factors affecting medical condition  -     ALPRAZolam (XANAX) 0.5 MG tablet; Take 1 tablet (0.5 mg total) by mouth 2 (two) times daily as needed for Anxiety.    Hypertension, unspecified type  -     carvediloL (COREG) 3.125 MG tablet; Take 1 tablet (3.125 mg total) by mouth 2 (two) times daily with meals.  -     hydrALAZINE (APRESOLINE) 25 MG tablet; Take 1 tablet (25 mg total) by mouth 3 (three) times daily.    Acute pulmonary embolism, unspecified pulmonary embolism type, unspecified whether acute cor pulmonale present  -     apixaban (ELIQUIS) 5 mg Tab; Take 1 tablet (5 mg total) by mouth 2 (two) times daily.  -     sacubitriL-valsartan (ENTRESTO) 24-26 mg per tablet; Take 1 tablet by mouth 2 (two) times daily.  -     amLODIPine (NORVASC) 5 MG tablet; Take 1 tablet (5 mg total) by mouth once daily.    Hyperlipidemia, unspecified hyperlipidemia type  -     atorvastatin (LIPITOR) 20 MG tablet; Take 1 tablet (20 mg total) by mouth once daily.    Chronic nonintractable headache, unspecified headache type  -     butalbital-acetaminophen-caffeine -40 mg (FIORICET, ESGIC) -40 mg per tablet; TAKE 1 TABLET DAILY AS NEEDED FOR PAIN OR HEADACHES.    Seasonal allergies  -     levocetirizine (XYZAL) 5 MG tablet; Take 1 tablet (5 mg total) by mouth every evening.    Malignant neoplasm of upper-outer quadrant of right breast in female, estrogen receptor positive  -     letrozole (FEMARA) 2.5 mg Tab; Take 1 tablet (2.5 mg total) by mouth once daily.  -     zolpidem (AMBIEN) 5 MG Tab; Take 1 tablet (5 mg total) by mouth nightly as needed (sleep).    Hot flashes  -     ranolazine (RANEXA) 1,000 mg Tb12; Take 1 tablet (1,000 mg total) by mouth 2 (two) times daily.

## 2023-10-07 PROCEDURE — G0179 MD RECERTIFICATION HHA PT: HCPCS | Mod: ,,, | Performed by: FAMILY MEDICINE

## 2023-10-07 PROCEDURE — G0179 PR HOME HEALTH MD RECERTIFICATION: ICD-10-PCS | Mod: ,,, | Performed by: FAMILY MEDICINE

## 2023-10-09 ENCOUNTER — OFFICE VISIT (OUTPATIENT)
Dept: FAMILY MEDICINE | Facility: CLINIC | Age: 73
End: 2023-10-09
Payer: MEDICARE

## 2023-10-09 DIAGNOSIS — Z76.0 ENCOUNTER FOR MEDICATION REFILL: Primary | ICD-10-CM

## 2023-10-09 PROCEDURE — 99214 PR OFFICE/OUTPT VISIT, EST, LEVL IV, 30-39 MIN: ICD-10-PCS | Mod: HCNC,95,, | Performed by: NURSE PRACTITIONER

## 2023-10-09 PROCEDURE — 1159F MED LIST DOCD IN RCRD: CPT | Mod: HCNC,CPTII,95, | Performed by: NURSE PRACTITIONER

## 2023-10-09 PROCEDURE — 1157F ADVNC CARE PLAN IN RCRD: CPT | Mod: HCNC,CPTII,95, | Performed by: NURSE PRACTITIONER

## 2023-10-09 PROCEDURE — 1160F PR REVIEW ALL MEDS BY PRESCRIBER/CLIN PHARMACIST DOCUMENTED: ICD-10-PCS | Mod: HCNC,CPTII,95, | Performed by: NURSE PRACTITIONER

## 2023-10-09 PROCEDURE — 1159F PR MEDICATION LIST DOCUMENTED IN MEDICAL RECORD: ICD-10-PCS | Mod: HCNC,CPTII,95, | Performed by: NURSE PRACTITIONER

## 2023-10-09 PROCEDURE — 1157F PR ADVANCE CARE PLAN OR EQUIV PRESENT IN MEDICAL RECORD: ICD-10-PCS | Mod: HCNC,CPTII,95, | Performed by: NURSE PRACTITIONER

## 2023-10-09 PROCEDURE — 4010F ACE/ARB THERAPY RXD/TAKEN: CPT | Mod: HCNC,CPTII,95, | Performed by: NURSE PRACTITIONER

## 2023-10-09 PROCEDURE — 99214 OFFICE O/P EST MOD 30 MIN: CPT | Mod: HCNC,95,, | Performed by: NURSE PRACTITIONER

## 2023-10-09 PROCEDURE — 4010F PR ACE/ARB THEARPY RXD/TAKEN: ICD-10-PCS | Mod: HCNC,CPTII,95, | Performed by: NURSE PRACTITIONER

## 2023-10-09 PROCEDURE — 1160F RVW MEDS BY RX/DR IN RCRD: CPT | Mod: HCNC,CPTII,95, | Performed by: NURSE PRACTITIONER

## 2023-10-09 NOTE — PROGRESS NOTES
Susu Luther  10/09/2023  [id number]      The patient location is: home  The chief complaint leading to consultation is: medication  Visit type: Virtual visit with synchronous audio and video  Total time spent with patient: 20  Each patient to whom he or she provides medical services by telemedicine is:  (1) informed of the relationship between the physician and patient and the respective role of any other health care provider with respect to management of the patient; and (2) notified that he or she may decline to receive medical services by telemedicine and may withdraw from such care at any time.        Chief Complaint:      History of Present Illness:    72 yo female presents today via telemedicine requesting medication refills    No other complaints experienced at this time       Review of Systems   HENT:  Negative for hearing loss.    Eyes:  Negative for discharge.   Respiratory:  Negative for wheezing.    Cardiovascular:  Negative for chest pain and palpitations.   Gastrointestinal:  Negative for blood in stool, constipation, diarrhea and vomiting.   Genitourinary:  Negative for dysuria and hematuria.   Musculoskeletal:  Negative for neck pain.   Neurological:  Positive for weakness. Negative for headaches.   Endo/Heme/Allergies:  Negative for polydipsia.         History:  Past Medical History:   Diagnosis Date    Cataract     Bilateral    Chronic diastolic congestive heart failure 08/25/2020    Dizziness 03/12/2023    Encounter for blood transfusion     History of repair of aneurysm of abdominal aorta using endovascular stent graft     DR Bonds    Hx of psychiatric care     Hypertension     Major depressive disorder, single episode, moderate with anxious distress 01/14/2020    Malignant neoplasm of upper-outer quadrant of right breast in female, estrogen receptor positive 03/14/2019    radiation    Psychiatric problem     Sleep apnea     Sleep difficulties     Stroke 2009    no residual defect     Therapy      Past Surgical History:   Procedure Laterality Date    APPENDECTOMY      AXILLARY NODE DISSECTION Right 2020    Procedure: LYMPHADENECTOMY, AXILLARY;  Surgeon: Artemio Starks MD;  Location: [facility] OR;  Service: General;  Laterality: Right;    BIOPSY OF AXILLARY NODE Right 2021    Procedure: BIOPSY, LYMPH NODE, AXILLARY;  Surgeon: Artemio Sutton MD;  Location: [facility] OR [address] [place] [address];  Service: General;  Laterality: Right;    BREAST BIOPSY      2019    BREAST LUMPECTOMY Right     2019     SECTION      x 1    OOPHORECTOMY      right     SENTINEL LYMPH NODE BIOPSY Right 2019    Procedure: BIOPSY, LYMPH NODE, SENTINEL;  Surgeon: Artemio Starks MD;  Location: [facility];  Service: General;  Laterality: Right;    splenic artery aneurysm repair      TONSILLECTOMY         Family History   Problem Relation Age of Onset    Diabetes Maternal Grandmother     Diabetes Maternal Grandfather     Diabetes Mother     Hypertension Mother     Sickle cell anemia Daughter     Breast cancer Neg Hx     Colon cancer Neg Hx     Ovarian cancer Neg Hx      Social History     Socioeconomic History    Marital status:      Spouse name: Campos Luther    Number of children: 2   Tobacco Use    Smoking status: Never     Passive exposure: Past    Smokeless tobacco: Never   Substance and Sexual Activity    Alcohol use: No    Drug use: No    Sexual activity: Yes     Partners: Male     Birth control/protection: Post-menopausal     Social Determinants of Health     Financial Resource Strain: Low Risk  (2023)    Overall Financial Resource Strain (CARDIA)     Difficulty of Paying Living Expenses: Not hard at all   Food Insecurity: No Food Insecurity (2023)    Hunger Vital Sign     Worried About Running Out of Food in the Last Year: Never true     Ran Out of Food in the Last Year: Never true   Transportation Needs: No Transportation Needs (2023)    PRAPARE - Transportation     Lack of Transportation  (Medical): No     Lack of Transportation (Non-Medical): No   Physical Activity: Insufficiently Active (8/23/2023)    Exercise Vital Sign     Days of Exercise per Week: 2 days     Minutes of Exercise per Session: 30 min   Stress: Stress Concern Present (8/23/2023)    [employer] [place] [employer] - Occupational Stress Questionnaire     Feeling of Stress : To some extent   Social Connections: Socially Integrated (8/23/2023)    Social Connection and Isolation Panel [NHANES]     Frequency of Communication with Friends and Family: More than three times a week     Frequency of Social Gatherings with Friends and Family: Once a week     Attends [employer] Services: More than 4 times per year     Active Member of Clubs or Organizations: Yes     Attends Club or Organization Meetings: More than 4 times per year     Marital Status:    Housing Stability: Low Risk  (8/23/2023)    Housing Stability Vital Sign     Unable to Pay for Housing in the Last Year: No     Number of Places Lived in the Last Year: 1     Unstable Housing in the Last Year: No     Patient Active Problem List   Diagnosis    NILS (obstructive sleep apnea)    Malignant neoplasm of upper-outer quadrant of right breast in female, estrogen receptor positive    Hypertension    Class 3 severe obesity in adult    Headache    Circadian rhythm sleep disorder    Nocturnal hypoxemia    Major depressive disorder, recurrent episode, moderate with anxious distress    Lymphadenopathy    Porcelain gallbladder    Axillary mass    Splenic artery aneurysm    Chronic kidney disease, stage 3a    Acute pulmonary embolism    Primary osteoarthritis of right hip    History of pulmonary embolism    Multiple thyroid nodules    History of prediabetes    Neuropathic pain    Neoplasm related pain    Chronic diastolic congestive heart failure    Anemia    Mixed incontinence     Review of patient's allergies indicates:   Allergen Reactions    Pcn [penicillins] Hives       The  following were reviewed at this visit: active problem list, medication list, allergies, family history, social history, and health maintenance.    Medications:  Current Outpatient Medications on File Prior to Visit   Medication Sig Dispense Refill    ALPRAZolam (XANAX) 0.5 MG tablet Take 1 tablet (0.5 mg total) by mouth 2 (two) times daily as needed for Anxiety. 180 tablet 0    amLODIPine (NORVASC) 5 MG tablet Take 1 tablet (5 mg total) by mouth once daily. 30 tablet 11    apixaban (ELIQUIS) 5 mg Tab Take 1 tablet (5 mg total) by mouth 2 (two) times daily. 180 tablet 1    atorvastatin (LIPITOR) 20 MG tablet Take 1 tablet (20 mg total) by mouth once daily. 90 tablet 3    buPROPion (WELLBUTRIN XL) 150 MG TB24 tablet Take 1 tablet (150 mg total) by mouth once daily. 30 tablet 11    butalbital-acetaminophen-caffeine -40 mg (FIORICET, ESGIC) -40 mg per tablet TAKE 1 TABLET DAILY AS NEEDED FOR PAIN OR HEADACHES. 30 tablet 1    carvediloL (COREG) 3.125 MG tablet Take 1 tablet (3.125 mg total) by mouth 2 (two) times daily with meals. 90 tablet 0    cholecalciferol, vitamin D3, 1,250 mcg (50,000 unit) capsule Take 1 capsule (50,000 Units total) by mouth once a week. 12 capsule 0    cloNIDine (CATAPRES) 0.1 MG tablet Take 2 tablets (0.2 mg total) by mouth 2 (two) times daily as needed (BP>170/95). 80 tablet 1    diclofenac sodium (VOLTAREN) 1 % Gel Apply 2 grams topically once daily. 400 g 1    gabapentin (NEURONTIN) 400 MG capsule Take 1 capsule (400 mg total) by mouth 3 (three) times daily. 120 capsule 0    hydrALAZINE (APRESOLINE) 25 MG tablet Take 1 tablet (25 mg total) by mouth 3 (three) times daily. 60 tablet 0    letrozole (FEMARA) 2.5 mg Tab Take 1 tablet (2.5 mg total) by mouth once daily. 90 tablet 1    levocetirizine (XYZAL) 5 MG tablet Take 1 tablet (5 mg total) by mouth every evening. 90 tablet 0    LIDOcaine (LIDODERM) 5 % Place 2 patches onto the skin once daily. Remove & Discard patch within 12  hours or as directed by MD 90 patch 1    metOLazone (ZAROXOLYN) 2.5 MG tablet Take 1 tablet 30 minutes before taking torsemide only once until further directed by your doctor. 10 tablet 3    multivitamin (THERAGRAN) per tablet Take 1 tablet by mouth once daily.      oxyCODONE-acetaminophen (PERCOCET) 7.5-325 mg per tablet Take 1 tablet by mouth every 6 (six) hours as needed for Pain. 60 tablet 0    ranolazine (RANEXA) 1,000 mg Tb12 Take 1 tablet (1,000 mg total) by mouth 2 (two) times daily. 180 tablet 3    sacubitriL-valsartan (ENTRESTO) 24-26 mg per tablet Take 1 tablet by mouth 2 (two) times daily. 60 tablet 2    tamsulosin (FLOMAX) 0.4 mg Cap Take 1 capsule (0.4 mg total) by mouth once daily. 30 capsule 0    torsemide (DEMADEX) 20 MG Tab Take 2 tablets (40 mg total) by mouth 2 (two) times a day. 120 tablet 6    traMADoL (ULTRAM-ER) 100 MG Tb24 Take 1 tablet (100 mg total) by mouth once daily. 30 tablet 2    zolpidem (AMBIEN) 5 MG Tab Take 1 tablet (5 mg total) by mouth nightly as needed (sleep). 90 tablet 1     No current facility-administered medications on file prior to visit.       Exam:  Wt Readings from Last 3 Encounters:   08/25/23 (!) 143.8 kg (317 lb)   08/25/23 (!) 143.1 kg (315 lb 7.7 oz)   08/23/23 (!) 144.2 kg (317 lb 14.5 oz)     Temp Readings from Last 3 Encounters:   08/23/23 97 °F (36.1 °C)   08/07/23 97.9 °F (36.6 °C) (Tympanic)   08/03/23 98.1 °F (36.7 °C) (Temporal)     BP Readings from Last 3 Encounters:   08/31/23 (!) 133/58   08/25/23 100/60   08/23/23 112/68     Pulse Readings from Last 3 Encounters:   08/25/23 70   08/23/23 74   08/07/23 73     There is no height or weight on file to calculate BMI.      @ROS@    Physical Exam  Vitals and nursing note reviewed.   Constitutional:       Appearance: Normal appearance.   Eyes:      Conjunctiva/sclera: Conjunctivae normal.   Pulmonary:      Effort: Pulmonary effort is normal.   Neurological:      General: No focal deficit present.      Mental  Status: She is alert and oriented to person, place, and time.   Psychiatric:         Mood and Affect: Mood normal.         Behavior: Behavior normal.         Thought Content: Thought content normal.         Judgment: Judgment normal.         Laboratory Reviewed ({Yes)  Lab Results   Component Value Date    WBC 9.05 08/03/2023    HGB 11.8 (L) 08/03/2023    HCT 37.2 08/03/2023     08/03/2023    CHOL 234 (H) 02/28/2023    TRIG 191 (H) 02/28/2023    HDL 44 02/28/2023    ALT 9 (L) 09/26/2023    AST 12 09/26/2023     09/26/2023    K 4.2 09/26/2023    CL 96 09/26/2023    CREATININE 3.4 (H) 09/26/2023    BUN 53 (H) 09/26/2023    CO2 26 09/26/2023    TSH 1.691 03/10/2023    INR 1.1 03/11/2023    HGBA1C 5.9 (H) 05/18/2019       Diagnoses and all orders for this visit:    Encounter for medication refill

## 2023-10-10 ENCOUNTER — PATIENT MESSAGE (OUTPATIENT)
Dept: FAMILY MEDICINE | Facility: CLINIC | Age: 73
End: 2023-10-10
Payer: MEDICARE

## 2023-10-10 DIAGNOSIS — M79.605 PAIN IN BOTH LOWER EXTREMITIES: ICD-10-CM

## 2023-10-10 DIAGNOSIS — I26.99 ACUTE PULMONARY EMBOLISM, UNSPECIFIED PULMONARY EMBOLISM TYPE, UNSPECIFIED WHETHER ACUTE COR PULMONALE PRESENT: ICD-10-CM

## 2023-10-10 DIAGNOSIS — F54 PSYCHOLOGICAL FACTORS AFFECTING MEDICAL CONDITION: ICD-10-CM

## 2023-10-10 DIAGNOSIS — C50.411 MALIGNANT NEOPLASM OF UPPER-OUTER QUADRANT OF RIGHT BREAST IN FEMALE, ESTROGEN RECEPTOR POSITIVE: ICD-10-CM

## 2023-10-10 DIAGNOSIS — F32.1 MAJOR DEPRESSIVE DISORDER, SINGLE EPISODE, MODERATE WITH ANXIOUS DISTRESS: ICD-10-CM

## 2023-10-10 DIAGNOSIS — E78.5 HYPERLIPIDEMIA, UNSPECIFIED HYPERLIPIDEMIA TYPE: ICD-10-CM

## 2023-10-10 DIAGNOSIS — I10 HYPERTENSION, UNSPECIFIED TYPE: Primary | ICD-10-CM

## 2023-10-10 DIAGNOSIS — R23.2 HOT FLASHES: ICD-10-CM

## 2023-10-10 DIAGNOSIS — M79.604 PAIN IN BOTH LOWER EXTREMITIES: ICD-10-CM

## 2023-10-10 DIAGNOSIS — M79.604 BILATERAL LEG PAIN: ICD-10-CM

## 2023-10-10 DIAGNOSIS — I10 HYPERTENSION, UNSPECIFIED TYPE: ICD-10-CM

## 2023-10-10 DIAGNOSIS — R51.9 CHRONIC NONINTRACTABLE HEADACHE, UNSPECIFIED HEADACHE TYPE: ICD-10-CM

## 2023-10-10 DIAGNOSIS — Z17.0 MALIGNANT NEOPLASM OF UPPER-OUTER QUADRANT OF RIGHT BREAST IN FEMALE, ESTROGEN RECEPTOR POSITIVE: ICD-10-CM

## 2023-10-10 DIAGNOSIS — J30.2 SEASONAL ALLERGIES: ICD-10-CM

## 2023-10-10 DIAGNOSIS — R79.89 LOW VITAMIN D LEVEL: ICD-10-CM

## 2023-10-10 DIAGNOSIS — I50.32 CHRONIC DIASTOLIC CONGESTIVE HEART FAILURE: ICD-10-CM

## 2023-10-10 DIAGNOSIS — I10 ESSENTIAL HYPERTENSION: ICD-10-CM

## 2023-10-10 DIAGNOSIS — G89.29 CHRONIC NONINTRACTABLE HEADACHE, UNSPECIFIED HEADACHE TYPE: ICD-10-CM

## 2023-10-10 DIAGNOSIS — M79.605 BILATERAL LEG PAIN: ICD-10-CM

## 2023-10-10 RX ORDER — LETROZOLE 2.5 MG/1
2.5 TABLET, FILM COATED ORAL
Qty: 90 TABLET | Refills: 1 | Status: SHIPPED | OUTPATIENT
Start: 2023-10-10 | End: 2023-10-14 | Stop reason: SDUPTHER

## 2023-10-11 ENCOUNTER — LAB VISIT (OUTPATIENT)
Dept: LAB | Facility: HOSPITAL | Age: 73
End: 2023-10-11
Attending: STUDENT IN AN ORGANIZED HEALTH CARE EDUCATION/TRAINING PROGRAM
Payer: MEDICARE

## 2023-10-11 DIAGNOSIS — I50.32 CHRONIC DIASTOLIC HEART FAILURE: ICD-10-CM

## 2023-10-11 DIAGNOSIS — I11.0 HYPERTENSIVE HEART DISEASE WITH CONGESTIVE HEART FAILURE: Primary | ICD-10-CM

## 2023-10-11 LAB
ALBUMIN SERPL BCP-MCNC: 3.2 G/DL (ref 3.5–5.2)
ALP SERPL-CCNC: 96 U/L (ref 55–135)
ALT SERPL W/O P-5'-P-CCNC: 9 U/L (ref 10–44)
ANION GAP SERPL CALC-SCNC: 7 MMOL/L (ref 8–16)
AST SERPL-CCNC: 11 U/L (ref 10–40)
BILIRUB SERPL-MCNC: 0.3 MG/DL (ref 0.1–1)
BUN SERPL-MCNC: 28 MG/DL (ref 8–23)
CALCIUM SERPL-MCNC: 8.7 MG/DL (ref 8.7–10.5)
CHLORIDE SERPL-SCNC: 110 MMOL/L (ref 95–110)
CO2 SERPL-SCNC: 23 MMOL/L (ref 23–29)
CREAT SERPL-MCNC: 1.5 MG/DL (ref 0.5–1.4)
EST. GFR  (NO RACE VARIABLE): 37 ML/MIN/1.73 M^2
GLUCOSE SERPL-MCNC: 90 MG/DL (ref 70–110)
POTASSIUM SERPL-SCNC: 4.5 MMOL/L (ref 3.5–5.1)
PROT SERPL-MCNC: 6.3 G/DL (ref 6–8.4)
SODIUM SERPL-SCNC: 140 MMOL/L (ref 136–145)

## 2023-10-11 PROCEDURE — 80053 COMPREHEN METABOLIC PANEL: CPT | Mod: HCNC | Performed by: STUDENT IN AN ORGANIZED HEALTH CARE EDUCATION/TRAINING PROGRAM

## 2023-10-11 PROCEDURE — 36415 COLL VENOUS BLD VENIPUNCTURE: CPT | Mod: HCNC,PN | Performed by: STUDENT IN AN ORGANIZED HEALTH CARE EDUCATION/TRAINING PROGRAM

## 2023-10-14 RX ORDER — RANOLAZINE 1000 MG/1
1000 TABLET, EXTENDED RELEASE ORAL 2 TIMES DAILY
Qty: 180 TABLET | Refills: 3 | Status: SHIPPED | OUTPATIENT
Start: 2023-10-14 | End: 2023-10-24 | Stop reason: SDUPTHER

## 2023-10-14 RX ORDER — ZOLPIDEM TARTRATE 5 MG/1
5 TABLET ORAL NIGHTLY PRN
Qty: 90 TABLET | Refills: 0 | Status: SHIPPED | OUTPATIENT
Start: 2023-10-14 | End: 2023-10-24

## 2023-10-14 RX ORDER — CLONIDINE HYDROCHLORIDE 0.1 MG/1
0.2 TABLET ORAL 2 TIMES DAILY PRN
Qty: 80 TABLET | Refills: 1 | Status: SHIPPED | OUTPATIENT
Start: 2023-10-14 | End: 2023-10-25

## 2023-10-14 RX ORDER — LEVOCETIRIZINE DIHYDROCHLORIDE 5 MG/1
5 TABLET, FILM COATED ORAL NIGHTLY
Qty: 90 TABLET | Refills: 0 | Status: SHIPPED | OUTPATIENT
Start: 2023-10-14 | End: 2023-10-24 | Stop reason: SDUPTHER

## 2023-10-14 RX ORDER — LETROZOLE 2.5 MG/1
2.5 TABLET, FILM COATED ORAL DAILY
Qty: 90 TABLET | Refills: 0 | Status: SHIPPED | OUTPATIENT
Start: 2023-10-14 | End: 2023-10-24 | Stop reason: SDUPTHER

## 2023-10-14 RX ORDER — ATORVASTATIN CALCIUM 20 MG/1
20 TABLET, FILM COATED ORAL DAILY
Qty: 90 TABLET | Refills: 3 | Status: SHIPPED | OUTPATIENT
Start: 2023-10-14 | End: 2023-10-24 | Stop reason: SDUPTHER

## 2023-10-14 RX ORDER — LIDOCAINE 50 MG/G
2 PATCH TOPICAL DAILY
Qty: 90 PATCH | Refills: 1 | Status: SHIPPED | OUTPATIENT
Start: 2023-10-14 | End: 2023-10-24 | Stop reason: SDUPTHER

## 2023-10-14 RX ORDER — ASPIRIN 325 MG
50000 TABLET, DELAYED RELEASE (ENTERIC COATED) ORAL WEEKLY
Qty: 12 CAPSULE | Refills: 0 | Status: SHIPPED | OUTPATIENT
Start: 2023-10-14 | End: 2023-11-08 | Stop reason: SDUPTHER

## 2023-10-14 RX ORDER — CARVEDILOL 3.12 MG/1
3.12 TABLET ORAL 2 TIMES DAILY WITH MEALS
Qty: 90 TABLET | Refills: 0 | Status: SHIPPED | OUTPATIENT
Start: 2023-10-14 | End: 2023-10-25

## 2023-10-14 RX ORDER — BUPROPION HYDROCHLORIDE 150 MG/1
150 TABLET ORAL DAILY
Qty: 90 TABLET | Refills: 3 | Status: SHIPPED | OUTPATIENT
Start: 2023-10-14 | End: 2023-11-07

## 2023-10-14 RX ORDER — METOLAZONE 2.5 MG/1
TABLET ORAL
Qty: 30 TABLET | Refills: 0 | Status: SHIPPED | OUTPATIENT
Start: 2023-10-14 | End: 2023-10-25

## 2023-10-14 RX ORDER — BUTALBITAL, ACETAMINOPHEN AND CAFFEINE 50; 325; 40 MG/1; MG/1; MG/1
TABLET ORAL
Qty: 90 TABLET | Refills: 2 | Status: SHIPPED | OUTPATIENT
Start: 2023-10-14 | End: 2023-10-24 | Stop reason: SDUPTHER

## 2023-10-14 RX ORDER — AMLODIPINE BESYLATE 5 MG/1
5 TABLET ORAL DAILY
Qty: 90 TABLET | Refills: 0 | Status: SHIPPED | OUTPATIENT
Start: 2023-10-14 | End: 2023-10-24 | Stop reason: SDUPTHER

## 2023-10-14 RX ORDER — GABAPENTIN 400 MG/1
400 CAPSULE ORAL 3 TIMES DAILY
Qty: 180 CAPSULE | Refills: 0 | Status: SHIPPED | OUTPATIENT
Start: 2023-10-14 | End: 2023-10-24 | Stop reason: SDUPTHER

## 2023-10-14 RX ORDER — HYDRALAZINE HYDROCHLORIDE 25 MG/1
25 TABLET, FILM COATED ORAL 3 TIMES DAILY
Qty: 180 TABLET | Refills: 0 | Status: SHIPPED | OUTPATIENT
Start: 2023-10-14 | End: 2023-10-25

## 2023-10-14 RX ORDER — ALPRAZOLAM 0.5 MG/1
0.5 TABLET ORAL 2 TIMES DAILY PRN
Qty: 180 TABLET | Refills: 0 | Status: SHIPPED | OUTPATIENT
Start: 2023-10-14 | End: 2023-11-08 | Stop reason: SDUPTHER

## 2023-10-14 RX ORDER — TRAMADOL HYDROCHLORIDE 100 MG/1
100 TABLET, EXTENDED RELEASE ORAL DAILY
Qty: 90 TABLET | Refills: 0 | Status: SHIPPED | OUTPATIENT
Start: 2023-10-14 | End: 2023-11-08 | Stop reason: SDUPTHER

## 2023-10-14 RX ORDER — SACUBITRIL AND VALSARTAN 24; 26 MG/1; MG/1
1 TABLET, FILM COATED ORAL 2 TIMES DAILY
Qty: 120 TABLET | Refills: 2 | Status: SHIPPED | OUTPATIENT
Start: 2023-10-14 | End: 2023-10-24 | Stop reason: SDUPTHER

## 2023-10-14 RX ORDER — TORSEMIDE 20 MG/1
40 TABLET ORAL 2 TIMES DAILY
Qty: 120 TABLET | Refills: 0 | Status: SHIPPED | OUTPATIENT
Start: 2023-10-14 | End: 2023-11-08 | Stop reason: SDUPTHER

## 2023-10-15 NOTE — TELEPHONE ENCOUNTER
No new care gaps identified.  [facility] Embedded Care Gaps. Reference number: [id number]. 9/09/2022   11:41:34 AM BRANDYT  
Spoke with patient at Dr's request. She is currently not on gout medication. She is taking Lasix 20mg: one tab PO BID and needs a refill sent in. She would like lasix and gout meds sent to [facility] on [address].   
Male

## 2023-10-17 ENCOUNTER — PATIENT MESSAGE (OUTPATIENT)
Dept: CARDIOLOGY | Facility: CLINIC | Age: 73
End: 2023-10-17
Payer: MEDICARE

## 2023-10-18 ENCOUNTER — OFFICE VISIT (OUTPATIENT)
Dept: PAIN MEDICINE | Facility: CLINIC | Age: 73
End: 2023-10-18
Payer: MEDICARE

## 2023-10-18 VITALS
HEIGHT: 72 IN | WEIGHT: 293 LBS | DIASTOLIC BLOOD PRESSURE: 78 MMHG | BODY MASS INDEX: 39.68 KG/M2 | HEART RATE: 80 BPM | SYSTOLIC BLOOD PRESSURE: 115 MMHG

## 2023-10-18 DIAGNOSIS — I89.0 LYMPHEDEMA: Primary | ICD-10-CM

## 2023-10-18 DIAGNOSIS — M25.559 HIP PAIN, UNSPECIFIED LATERALITY: ICD-10-CM

## 2023-10-18 DIAGNOSIS — M25.511 RIGHT SHOULDER PAIN, UNSPECIFIED CHRONICITY: ICD-10-CM

## 2023-10-18 PROCEDURE — 3288F PR FALLS RISK ASSESSMENT DOCUMENTED: ICD-10-PCS | Mod: CPTII,S$GLB,, | Performed by: PHYSICAL MEDICINE & REHABILITATION

## 2023-10-18 PROCEDURE — 1125F PR PAIN SEVERITY QUANTIFIED, PAIN PRESENT: ICD-10-PCS | Mod: CPTII,S$GLB,, | Performed by: PHYSICAL MEDICINE & REHABILITATION

## 2023-10-18 PROCEDURE — 3078F DIAST BP <80 MM HG: CPT | Mod: CPTII,S$GLB,, | Performed by: PHYSICAL MEDICINE & REHABILITATION

## 2023-10-18 PROCEDURE — 99999 PR PBB SHADOW E&M-EST. PATIENT-LVL V: CPT | Mod: PBBFAC,,, | Performed by: PHYSICAL MEDICINE & REHABILITATION

## 2023-10-18 PROCEDURE — 3078F PR MOST RECENT DIASTOLIC BLOOD PRESSURE < 80 MM HG: ICD-10-PCS | Mod: CPTII,S$GLB,, | Performed by: PHYSICAL MEDICINE & REHABILITATION

## 2023-10-18 PROCEDURE — 3288F FALL RISK ASSESSMENT DOCD: CPT | Mod: CPTII,S$GLB,, | Performed by: PHYSICAL MEDICINE & REHABILITATION

## 2023-10-18 PROCEDURE — 4010F ACE/ARB THERAPY RXD/TAKEN: CPT | Mod: CPTII,S$GLB,, | Performed by: PHYSICAL MEDICINE & REHABILITATION

## 2023-10-18 PROCEDURE — 1157F PR ADVANCE CARE PLAN OR EQUIV PRESENT IN MEDICAL RECORD: ICD-10-PCS | Mod: CPTII,S$GLB,, | Performed by: PHYSICAL MEDICINE & REHABILITATION

## 2023-10-18 PROCEDURE — 99214 PR OFFICE/OUTPT VISIT, EST, LEVL IV, 30-39 MIN: ICD-10-PCS | Mod: S$GLB,,, | Performed by: PHYSICAL MEDICINE & REHABILITATION

## 2023-10-18 PROCEDURE — 1100F PR PT FALLS ASSESS DOC 2+ FALLS/FALL W/INJURY/YR: ICD-10-PCS | Mod: CPTII,S$GLB,, | Performed by: PHYSICAL MEDICINE & REHABILITATION

## 2023-10-18 PROCEDURE — 1157F ADVNC CARE PLAN IN RCRD: CPT | Mod: CPTII,S$GLB,, | Performed by: PHYSICAL MEDICINE & REHABILITATION

## 2023-10-18 PROCEDURE — 99999 PR PBB SHADOW E&M-EST. PATIENT-LVL V: ICD-10-PCS | Mod: PBBFAC,,, | Performed by: PHYSICAL MEDICINE & REHABILITATION

## 2023-10-18 PROCEDURE — 99214 OFFICE O/P EST MOD 30 MIN: CPT | Mod: S$GLB,,, | Performed by: PHYSICAL MEDICINE & REHABILITATION

## 2023-10-18 PROCEDURE — 3074F SYST BP LT 130 MM HG: CPT | Mod: CPTII,S$GLB,, | Performed by: PHYSICAL MEDICINE & REHABILITATION

## 2023-10-18 PROCEDURE — 1100F PTFALLS ASSESS-DOCD GE2>/YR: CPT | Mod: CPTII,S$GLB,, | Performed by: PHYSICAL MEDICINE & REHABILITATION

## 2023-10-18 PROCEDURE — 1159F PR MEDICATION LIST DOCUMENTED IN MEDICAL RECORD: ICD-10-PCS | Mod: CPTII,S$GLB,, | Performed by: PHYSICAL MEDICINE & REHABILITATION

## 2023-10-18 PROCEDURE — 3008F BODY MASS INDEX DOCD: CPT | Mod: CPTII,S$GLB,, | Performed by: PHYSICAL MEDICINE & REHABILITATION

## 2023-10-18 PROCEDURE — 1159F MED LIST DOCD IN RCRD: CPT | Mod: CPTII,S$GLB,, | Performed by: PHYSICAL MEDICINE & REHABILITATION

## 2023-10-18 PROCEDURE — 3008F PR BODY MASS INDEX (BMI) DOCUMENTED: ICD-10-PCS | Mod: CPTII,S$GLB,, | Performed by: PHYSICAL MEDICINE & REHABILITATION

## 2023-10-18 PROCEDURE — 1125F AMNT PAIN NOTED PAIN PRSNT: CPT | Mod: CPTII,S$GLB,, | Performed by: PHYSICAL MEDICINE & REHABILITATION

## 2023-10-18 PROCEDURE — 4010F PR ACE/ARB THEARPY RXD/TAKEN: ICD-10-PCS | Mod: CPTII,S$GLB,, | Performed by: PHYSICAL MEDICINE & REHABILITATION

## 2023-10-18 PROCEDURE — 3074F PR MOST RECENT SYSTOLIC BLOOD PRESSURE < 130 MM HG: ICD-10-PCS | Mod: CPTII,S$GLB,, | Performed by: PHYSICAL MEDICINE & REHABILITATION

## 2023-10-18 RX ORDER — TORSEMIDE 20 MG/1
1 TABLET ORAL DAILY
COMMUNITY
Start: 2023-10-07 | End: 2023-10-18

## 2023-10-18 NOTE — PROGRESS NOTES
Established chronic pain patient note (follow-up visit):    Chief Pain Complaint:  Right axillary Pain   Right upper extremity Pain       History of Present Illness:   Susu Luther is a 73 y.o. female  who is presenting with a chief complaint of right axillary and upper extremity Pain. The patient began experiencing this problem insidiously, and the pain has been gradually worsening over the past 3 month(s). The pain is described as throbbing, cramping, burning, aching and heavy and is located in the right axilla . Pain is intermittent and lasts hours. The  pain is nonradiating. The patient rates her pain a 7 out of ten and interferes with activities of daily living a 7 out of ten. Pain is exacerbated by rasing the right arm, lying on the right side, and is improved by rest. Patient reports no prior trauma, no prior spinal surgery  note, patient has a history of breast cancer    - pertinent negatives: No fever, No chills, No weight loss, No bladder dysfunction, No bowel dysfunction, No saddle anesthesia  - pertinent positives: none    - medications, other therapies tried (physical therapy, injections):     >> NSAIDs, Tylenol, Tramadol, Norco, Percocet and gabapentin    >> Has NOT previously undergone Physical Therapy    >> Has NOT previously undergone spinal injection/s      Imaging / Labs / Studies (reviewed on 10/18/2023):    Results for orders placed during the hospital encounter of 05/18/20    X-Ray Cervical Spine Complete 5 view    Narrative  EXAMINATION:  XR CERVICAL SPINE COMPLETE 5 VIEW    CLINICAL HISTORY:  Exposure to other specified factors, sequela.  Accident, sequela.    FINDINGS:  There is no cervical spine fracture or malalignment.  C5-6 spondylosis with anterior spurring.  Otherwise, unremarkable cervical spine x-ray.    Impression  See above.      Electronically signed by: Naun Jennings MD  Date:    05/18/2020  Time:    11:58      Review of Systems:  CONSTITUTIONAL: patient denies any fever,  "chills, or weight loss  SKIN: patient denies any rash or itching  RESPIRATORY: patient denies having any shortness of breath  GASTROINTESTINAL: patient denies having any diarrhea, constipation, or bowel incontinence  GENITOURINARY: patient denies having any abnormal bladder function    MUSCULOSKELETAL:  - patient complains of the above noted pain/s (see chief pain complaint)    NEUROLOGICAL:   - pain as above  - strength in Upper extremities is intact, BILATERALLY  - sensation in Upper extremities is intact, BILATERALLY  - patient denies any loss of bowel or bladder control      PSYCHIATRIC: patient denies any change in mood    Other:  All other systems reviewed and are negative      Physical Exam:  /78   Pulse 80   Ht 6' 1" (1.854 m)   Wt (!) 143.1 kg (315 lb 7.7 oz)   LMP  (LMP Unknown)   BMI 41.62 kg/m²  (reviewed on 10/18/2023)  General: Alert and oriented, in no apparent distress.  Gait: normal gait.  Skin: No rashes, No discoloration, No obvious lesions  HEENT: Normocephalic, atraumatic. Pupils equal and round.  Cardiovascular: Regular rate and rhythm , moderately significant peripheral edema present in the bilateral lower extremities and right upper extremity  Respiratory: Without audible wheezing, without use of accessory muscles of respiration.    Musculoskeletal:    - TTP over right axilla     Neuro:    Strength:  UE R/L: D: 5/5; B: 5/5; T: 5/5; WF: 5/5; WE: 5/5; IO: 5/5;  LE R/L: HF: 5/5, HE: 5/5, KF: 5/5; KE: 5/5; FE: 5/5; FF: 5/5    Extremity Reflexes: Brisk and symmetric throughout.      Extremity Sensory: Sensation to pinprick and temperature symmetric. Proprioception intact.      Psych:  Mood and affect is appropriate      Assessment:    Susu Luther is a 73 y.o. year old female who is presenting with     Encounter Diagnoses   Name Primary?    Hip pain, unspecified laterality     Right shoulder pain, unspecified chronicity     Lymphedema Yes         Plan:    1. Interventional: None " for now.    2. Pharmacologic: Increase Gabapentin from 600 mg PO TID to 600/600/1200 mg PO TID. Start Tizanidine 4 mg Po BID pRN. Patient was prescribed Percocet 7.5/325 mg Po TID (90 tabs) on 5/25/2021 by Dr Clarke. This is not something we will continue.     3. Rehabilitative:  Placing external referral to lymphedema physical therapy clinic at [facility]    4. Diagnostic:  Reviewed labs and imaging available    5. Follow up:  10 weeks or as needed.      Barry Edwards MD  Interventional Pain Medicine  [facility] [place]            Disclaimer:  This note may have been prepared using voice recognition software, it may have not been extensively proofed, as such there could be errors within the text such as sound alike errors.

## 2023-10-23 ENCOUNTER — OUTPATIENT CASE MANAGEMENT (OUTPATIENT)
Dept: ADMINISTRATIVE | Facility: OTHER | Age: 73
End: 2023-10-23
Payer: MEDICARE

## 2023-10-24 ENCOUNTER — EXTERNAL HOME HEALTH (OUTPATIENT)
Dept: HOME HEALTH SERVICES | Facility: HOSPITAL | Age: 73
End: 2023-10-24
Payer: MEDICARE

## 2023-10-24 ENCOUNTER — OFFICE VISIT (OUTPATIENT)
Dept: INTERNAL MEDICINE | Facility: CLINIC | Age: 73
End: 2023-10-24
Payer: MEDICARE

## 2023-10-24 VITALS
OXYGEN SATURATION: 100 % | WEIGHT: 293 LBS | BODY MASS INDEX: 41.91 KG/M2 | SYSTOLIC BLOOD PRESSURE: 120 MMHG | HEART RATE: 73 BPM | DIASTOLIC BLOOD PRESSURE: 76 MMHG | RESPIRATION RATE: 20 BRPM | TEMPERATURE: 98 F

## 2023-10-24 DIAGNOSIS — R51.9 CHRONIC NONINTRACTABLE HEADACHE, UNSPECIFIED HEADACHE TYPE: ICD-10-CM

## 2023-10-24 DIAGNOSIS — E78.5 HYPERLIPIDEMIA, UNSPECIFIED HYPERLIPIDEMIA TYPE: ICD-10-CM

## 2023-10-24 DIAGNOSIS — G89.29 CHRONIC NONINTRACTABLE HEADACHE, UNSPECIFIED HEADACHE TYPE: ICD-10-CM

## 2023-10-24 DIAGNOSIS — Z17.0 MALIGNANT NEOPLASM OF UPPER-OUTER QUADRANT OF RIGHT BREAST IN FEMALE, ESTROGEN RECEPTOR POSITIVE: ICD-10-CM

## 2023-10-24 DIAGNOSIS — R23.2 HOT FLASHES: ICD-10-CM

## 2023-10-24 DIAGNOSIS — G89.3 NEOPLASM RELATED PAIN: ICD-10-CM

## 2023-10-24 DIAGNOSIS — I26.99 ACUTE PULMONARY EMBOLISM, UNSPECIFIED PULMONARY EMBOLISM TYPE, UNSPECIFIED WHETHER ACUTE COR PULMONALE PRESENT: ICD-10-CM

## 2023-10-24 DIAGNOSIS — J30.2 SEASONAL ALLERGIES: ICD-10-CM

## 2023-10-24 DIAGNOSIS — M79.605 BILATERAL LEG PAIN: ICD-10-CM

## 2023-10-24 DIAGNOSIS — M79.604 PAIN IN BOTH LOWER EXTREMITIES: ICD-10-CM

## 2023-10-24 DIAGNOSIS — C50.411 MALIGNANT NEOPLASM OF UPPER-OUTER QUADRANT OF RIGHT BREAST IN FEMALE, ESTROGEN RECEPTOR POSITIVE: ICD-10-CM

## 2023-10-24 DIAGNOSIS — M79.604 BILATERAL LEG PAIN: ICD-10-CM

## 2023-10-24 DIAGNOSIS — M79.605 PAIN IN BOTH LOWER EXTREMITIES: ICD-10-CM

## 2023-10-24 PROCEDURE — 3008F PR BODY MASS INDEX (BMI) DOCUMENTED: ICD-10-PCS | Mod: CPTII,S$GLB,, | Performed by: FAMILY MEDICINE

## 2023-10-24 PROCEDURE — 1160F PR REVIEW ALL MEDS BY PRESCRIBER/CLIN PHARMACIST DOCUMENTED: ICD-10-PCS | Mod: CPTII,S$GLB,, | Performed by: FAMILY MEDICINE

## 2023-10-24 PROCEDURE — 1157F ADVNC CARE PLAN IN RCRD: CPT | Mod: CPTII,S$GLB,, | Performed by: FAMILY MEDICINE

## 2023-10-24 PROCEDURE — 1125F AMNT PAIN NOTED PAIN PRSNT: CPT | Mod: CPTII,S$GLB,, | Performed by: FAMILY MEDICINE

## 2023-10-24 PROCEDURE — 1157F PR ADVANCE CARE PLAN OR EQUIV PRESENT IN MEDICAL RECORD: ICD-10-PCS | Mod: CPTII,S$GLB,, | Performed by: FAMILY MEDICINE

## 2023-10-24 PROCEDURE — 3074F PR MOST RECENT SYSTOLIC BLOOD PRESSURE < 130 MM HG: ICD-10-PCS | Mod: CPTII,S$GLB,, | Performed by: FAMILY MEDICINE

## 2023-10-24 PROCEDURE — 1101F PR PT FALLS ASSESS DOC 0-1 FALLS W/OUT INJ PAST YR: ICD-10-PCS | Mod: CPTII,S$GLB,, | Performed by: FAMILY MEDICINE

## 2023-10-24 PROCEDURE — 3288F PR FALLS RISK ASSESSMENT DOCUMENTED: ICD-10-PCS | Mod: CPTII,S$GLB,, | Performed by: FAMILY MEDICINE

## 2023-10-24 PROCEDURE — 99999 PR PBB SHADOW E&M-EST. PATIENT-LVL IV: CPT | Mod: PBBFAC,,, | Performed by: FAMILY MEDICINE

## 2023-10-24 PROCEDURE — 3074F SYST BP LT 130 MM HG: CPT | Mod: CPTII,S$GLB,, | Performed by: FAMILY MEDICINE

## 2023-10-24 PROCEDURE — 99214 OFFICE O/P EST MOD 30 MIN: CPT | Mod: S$GLB,,, | Performed by: FAMILY MEDICINE

## 2023-10-24 PROCEDURE — 99214 PR OFFICE/OUTPT VISIT, EST, LEVL IV, 30-39 MIN: ICD-10-PCS | Mod: S$GLB,,, | Performed by: FAMILY MEDICINE

## 2023-10-24 PROCEDURE — 3288F FALL RISK ASSESSMENT DOCD: CPT | Mod: CPTII,S$GLB,, | Performed by: FAMILY MEDICINE

## 2023-10-24 PROCEDURE — 1160F RVW MEDS BY RX/DR IN RCRD: CPT | Mod: CPTII,S$GLB,, | Performed by: FAMILY MEDICINE

## 2023-10-24 PROCEDURE — 1159F PR MEDICATION LIST DOCUMENTED IN MEDICAL RECORD: ICD-10-PCS | Mod: CPTII,S$GLB,, | Performed by: FAMILY MEDICINE

## 2023-10-24 PROCEDURE — 4010F PR ACE/ARB THEARPY RXD/TAKEN: ICD-10-PCS | Mod: CPTII,S$GLB,, | Performed by: FAMILY MEDICINE

## 2023-10-24 PROCEDURE — 4010F ACE/ARB THERAPY RXD/TAKEN: CPT | Mod: CPTII,S$GLB,, | Performed by: FAMILY MEDICINE

## 2023-10-24 PROCEDURE — 1125F PR PAIN SEVERITY QUANTIFIED, PAIN PRESENT: ICD-10-PCS | Mod: CPTII,S$GLB,, | Performed by: FAMILY MEDICINE

## 2023-10-24 PROCEDURE — 99999 PR PBB SHADOW E&M-EST. PATIENT-LVL IV: ICD-10-PCS | Mod: PBBFAC,,, | Performed by: FAMILY MEDICINE

## 2023-10-24 PROCEDURE — 1159F MED LIST DOCD IN RCRD: CPT | Mod: CPTII,S$GLB,, | Performed by: FAMILY MEDICINE

## 2023-10-24 PROCEDURE — 3008F BODY MASS INDEX DOCD: CPT | Mod: CPTII,S$GLB,, | Performed by: FAMILY MEDICINE

## 2023-10-24 PROCEDURE — 1101F PT FALLS ASSESS-DOCD LE1/YR: CPT | Mod: CPTII,S$GLB,, | Performed by: FAMILY MEDICINE

## 2023-10-24 PROCEDURE — 3078F PR MOST RECENT DIASTOLIC BLOOD PRESSURE < 80 MM HG: ICD-10-PCS | Mod: CPTII,S$GLB,, | Performed by: FAMILY MEDICINE

## 2023-10-24 PROCEDURE — 3078F DIAST BP <80 MM HG: CPT | Mod: CPTII,S$GLB,, | Performed by: FAMILY MEDICINE

## 2023-10-24 RX ORDER — RANOLAZINE 1000 MG/1
1000 TABLET, EXTENDED RELEASE ORAL 2 TIMES DAILY
Qty: 180 TABLET | Refills: 3 | Status: SHIPPED | OUTPATIENT
Start: 2023-10-24 | End: 2024-10-23

## 2023-10-24 RX ORDER — AMLODIPINE BESYLATE 5 MG/1
5 TABLET ORAL DAILY
Qty: 90 TABLET | Refills: 0 | Status: SHIPPED | OUTPATIENT
Start: 2023-10-24 | End: 2023-11-07 | Stop reason: SINTOL

## 2023-10-24 RX ORDER — LETROZOLE 2.5 MG/1
2.5 TABLET, FILM COATED ORAL DAILY
Qty: 90 TABLET | Refills: 0 | Status: SHIPPED | OUTPATIENT
Start: 2023-10-24 | End: 2024-01-30

## 2023-10-24 RX ORDER — BUTALBITAL, ACETAMINOPHEN AND CAFFEINE 50; 325; 40 MG/1; MG/1; MG/1
TABLET ORAL
Qty: 90 TABLET | Refills: 2 | Status: SHIPPED | OUTPATIENT
Start: 2023-10-24 | End: 2023-10-24

## 2023-10-24 RX ORDER — ATORVASTATIN CALCIUM 20 MG/1
20 TABLET, FILM COATED ORAL DAILY
Qty: 90 TABLET | Refills: 3 | Status: SHIPPED | OUTPATIENT
Start: 2023-10-24 | End: 2024-01-30

## 2023-10-24 RX ORDER — DICLOFENAC SODIUM 10 MG/G
2 GEL TOPICAL DAILY
Qty: 400 G | Refills: 1 | Status: SHIPPED | OUTPATIENT
Start: 2023-10-24 | End: 2024-02-22 | Stop reason: SDUPTHER

## 2023-10-24 RX ORDER — SACUBITRIL AND VALSARTAN 24; 26 MG/1; MG/1
1 TABLET, FILM COATED ORAL 2 TIMES DAILY
Qty: 120 TABLET | Refills: 2 | Status: SHIPPED | OUTPATIENT
Start: 2023-10-24 | End: 2023-11-08 | Stop reason: SDUPTHER

## 2023-10-24 RX ORDER — LEVOCETIRIZINE DIHYDROCHLORIDE 5 MG/1
5 TABLET, FILM COATED ORAL NIGHTLY
Qty: 90 TABLET | Refills: 0 | Status: SHIPPED | OUTPATIENT
Start: 2023-10-24 | End: 2024-01-30

## 2023-10-24 RX ORDER — LIDOCAINE 50 MG/G
2 PATCH TOPICAL DAILY
Qty: 90 PATCH | Refills: 1 | Status: SHIPPED | OUTPATIENT
Start: 2023-10-24

## 2023-10-24 RX ORDER — GABAPENTIN 400 MG/1
400 CAPSULE ORAL 3 TIMES DAILY
Qty: 180 CAPSULE | Refills: 0 | Status: SHIPPED | OUTPATIENT
Start: 2023-10-24 | End: 2023-12-21

## 2023-10-24 NOTE — PROGRESS NOTES
Subjective:       Patient ID: Susu Luther is a 73 y.o. female.    Chief Complaint: Establish Care    History of breast cancer,  Has pain in the right shoulder area with associated with right hand weakness with difficulty writing    HTN  History of stroke, if she talks too long has issues with slurred speech, previously did speech therapy     Has most DME equipments but gets tired easily  Would like to discontinue home health      + shortness of breath, swelling in lower extremity       Has 2 daughter who lives in [place], takes OTC supplement for hot flash symptpms bt medication is not owrking    Has not taken Perocet since Dr. Arias last prescribed in 6/2023, currently taking gabapentin 400 mg, xanax for panic attacks,       Review of Systems   Constitutional:  Negative for chills and fever.   HENT:  Negative for congestion, rhinorrhea and sore throat.    Respiratory:  Negative for cough, shortness of breath and wheezing.    Cardiovascular:  Negative for chest pain, palpitations and leg swelling.   Gastrointestinal:  Negative for abdominal pain, constipation, diarrhea, nausea and vomiting.   Endocrine:        + hot flashes    Genitourinary:  Negative for dysuria, frequency and urgency.   Neurological:  Negative for headaches.   Psychiatric/Behavioral:  Negative for dysphoric mood.          Current Outpatient Medications on File Prior to Visit   Medication Sig Dispense Refill    ALPRAZolam (XANAX) 0.5 MG tablet Take 1 tablet (0.5 mg total) by mouth 2 (two) times daily as needed for Anxiety. 180 tablet 0    buPROPion (WELLBUTRIN XL) 150 MG TB24 tablet Take 1 tablet (150 mg total) by mouth once daily. 90 tablet 3    cholecalciferol, vitamin D3, 1,250 mcg (50,000 unit) capsule Take 1 capsule (50,000 Units total) by mouth once a week. 12 capsule 0    multivitamin (THERAGRAN) per tablet Take 1 tablet by mouth once daily.      oxyCODONE-acetaminophen (PERCOCET) 7.5-325 mg per tablet Take 1 tablet by mouth every 6  (six) hours as needed for Pain. 60 tablet 0    torsemide (DEMADEX) 20 MG Tab Take 2 tablets (40 mg total) by mouth 2 (two) times a day. 120 tablet 0    traMADoL (ULTRAM-ER) 100 MG Tb24 Take 1 tablet (100 mg total) by mouth once daily. 90 tablet 0    tamsulosin (FLOMAX) 0.4 mg Cap Take 1 capsule (0.4 mg total) by mouth once daily. 30 capsule 0     No current facility-administered medications on file prior to visit.        Past Medical History:   Diagnosis Date    Cataract     Bilateral    Chronic diastolic congestive heart failure 2020    Dizziness 2023    Encounter for blood transfusion     History of repair of aneurysm of abdominal aorta using endovascular stent graft     DR Bonds     of psychiatric care     Hypertension     Major depressive disorder, single episode, moderate with anxious distress 2020    Malignant neoplasm of upper-outer quadrant of right breast in female, estrogen receptor positive 2019    radiation    Psychiatric problem     Sleep apnea     Sleep difficulties     Stroke     no residual defect    Therapy         Past Surgical History:   Procedure Laterality Date    APPENDECTOMY      AXILLARY NODE DISSECTION Right 2020    Procedure: LYMPHADENECTOMY, AXILLARY;  Surgeon: Artemio Starks MD;  Location: [facility] OR;  Service: General;  Laterality: Right;    BIOPSY OF AXILLARY NODE Right 2021    Procedure: BIOPSY, LYMPH NODE, AXILLARY;  Surgeon: Artemio Sutton MD;  Location: [address];  Service: General;  Laterality: Right;    BREAST BIOPSY      2019    BREAST LUMPECTOMY Right     2019     SECTION      x 1    OOPHORECTOMY      right     SENTINEL LYMPH NODE BIOPSY Right 2019    Procedure: BIOPSY, LYMPH NODE, SENTINEL;  Surgeon: Artemio Starks MD;  Location: [facility] OR;  Service: General;  Laterality: Right;    splenic artery aneurysm repair      TONSILLECTOMY        Objective:      Physical Exam  Constitutional:       Appearance: She is  obese.   HENT:      Head: Normocephalic and atraumatic.      Nose: No congestion or rhinorrhea.   Eyes:      General: No scleral icterus.        Right eye: No discharge.         Left eye: No discharge.      Extraocular Movements: Extraocular movements intact.      Conjunctiva/sclera: Conjunctivae normal.      Pupils: Pupils are equal, round, and reactive to light.   Cardiovascular:      Rate and Rhythm: Normal rate and regular rhythm.      Heart sounds: No murmur heard.     No friction rub. No gallop.   Pulmonary:      Effort: Pulmonary effort is normal. No respiratory distress.      Breath sounds: Normal breath sounds. No stridor. No wheezing or rhonchi.   Abdominal:      General: Bowel sounds are normal.      Palpations: Abdomen is soft.   Musculoskeletal:      Right lower leg: No edema.      Left lower leg: No edema.   Skin:     General: Skin is warm.   Neurological:      General: No focal deficit present.      Mental Status: She is alert.   Psychiatric:         Mood and Affect: Mood normal.         Behavior: Behavior normal.         Assessment:       1. Malignant neoplasm of upper-outer quadrant of right breast in female, estrogen receptor positive    2. Acute pulmonary embolism, unspecified pulmonary embolism type, unspecified whether acute cor pulmonale present    3. Hyperlipidemia, unspecified hyperlipidemia type    4. Bilateral leg pain    5. Pain in both lower extremities    6. Seasonal allergies    7. Neoplasm related pain    8. Hot flashes    9. Chronic nonintractable headache, unspecified headache type        Plan:   1. Malignant neoplasm of upper-outer quadrant of right breast in female, estrogen receptor positive  Overview:  Followed by hematology/oncology    Orders:  -     letrozole (FEMARA) 2.5 mg Tab; Take 1 tablet (2.5 mg total) by mouth once daily.  Dispense: 90 tablet; Refill: 0    2. Acute pulmonary embolism, unspecified pulmonary embolism type, unspecified whether acute cor pulmonale present  -      amLODIPine (NORVASC) 5 MG tablet; Take 1 tablet (5 mg total) by mouth once daily.  Dispense: 90 tablet; Refill: 0  -     apixaban (ELIQUIS) 5 mg Tab; Take 1 tablet (5 mg total) by mouth 2 (two) times daily.  Dispense: 180 tablet; Refill: 1  -     sacubitriL-valsartan (ENTRESTO) 24-26 mg per tablet; Take 1 tablet by mouth 2 (two) times daily.  Dispense: 120 tablet; Refill: 2    3. Hyperlipidemia, unspecified hyperlipidemia type  -     atorvastatin (LIPITOR) 20 MG tablet; Take 1 tablet (20 mg total) by mouth once daily.  Dispense: 90 tablet; Refill: 3    4. Bilateral leg pain  -     diclofenac sodium (VOLTAREN) 1 % Gel; Apply 2 grams topically once daily.  Dispense: 400 g; Refill: 1  -     LIDOcaine (LIDODERM) 5 %; Place 2 patches onto the skin once daily. Remove & Discard patch within 12 hours or as directed by MD  Dispense: 90 patch; Refill: 1    5. Pain in both lower extremities  -     gabapentin (NEURONTIN) 400 MG capsule; Take 1 capsule (400 mg total) by mouth 3 (three) times daily.  Dispense: 180 capsule; Refill: 0    6. Seasonal allergies  -     levocetirizine (XYZAL) 5 MG tablet; Take 1 tablet (5 mg total) by mouth every evening.  Dispense: 90 tablet; Refill: 0    7. Neoplasm related pain  Overview:  Continue taking Percocet 7.5mg as needed 4x a day for pain. May break the tablet in half to see if this is effective and does not cause somnolence.     Further adjustments pending response to the other medication changes made recently.      8. Hot flashes  -     ranolazine (RANEXA) 1,000 mg Tb12; Take 1 tablet (1,000 mg total) by mouth 2 (two) times daily.  Dispense: 180 tablet; Refill: 3    9. Chronic nonintractable headache, unspecified headache type  -     Discontinue: butalbital-acetaminophen-caffeine -40 mg (FIORICET, ESGIC) -40 mg per tablet; TAKE 1 TABLET DAILY AS NEEDED FOR PAIN OR HEADACHES.  Dispense: 90 tablet; Refill: 2       For next appointment, will discuss treatment options for hot  flashes: renée     Follow up in about 2 weeks (around 11/7/2023) for Follow-Up.    Rubi Littlejohn MD  Family Medicine

## 2023-10-25 ENCOUNTER — LAB VISIT (OUTPATIENT)
Dept: LAB | Facility: HOSPITAL | Age: 73
End: 2023-10-25
Attending: STUDENT IN AN ORGANIZED HEALTH CARE EDUCATION/TRAINING PROGRAM
Payer: MEDICARE

## 2023-10-25 ENCOUNTER — OFFICE VISIT (OUTPATIENT)
Dept: CARDIOLOGY | Facility: CLINIC | Age: 73
End: 2023-10-25
Payer: MEDICARE

## 2023-10-25 VITALS
HEIGHT: 72 IN | BODY MASS INDEX: 39.68 KG/M2 | WEIGHT: 293 LBS | HEART RATE: 63 BPM | DIASTOLIC BLOOD PRESSURE: 80 MMHG | SYSTOLIC BLOOD PRESSURE: 130 MMHG | OXYGEN SATURATION: 97 %

## 2023-10-25 DIAGNOSIS — M79.2 NEUROPATHIC PAIN: ICD-10-CM

## 2023-10-25 DIAGNOSIS — I10 HYPERTENSION, UNSPECIFIED TYPE: Primary | ICD-10-CM

## 2023-10-25 DIAGNOSIS — I50.32 CHRONIC DIASTOLIC CONGESTIVE HEART FAILURE: ICD-10-CM

## 2023-10-25 DIAGNOSIS — E66.01 CLASS 3 SEVERE OBESITY WITH SERIOUS COMORBIDITY AND BODY MASS INDEX (BMI) OF 40.0 TO 44.9 IN ADULT, UNSPECIFIED OBESITY TYPE: Chronic | ICD-10-CM

## 2023-10-25 DIAGNOSIS — Z86.711 HISTORY OF PULMONARY EMBOLISM: ICD-10-CM

## 2023-10-25 DIAGNOSIS — I71.40 ABDOMINAL AORTIC ANEURYSM (AAA) WITHOUT RUPTURE, UNSPECIFIED PART: ICD-10-CM

## 2023-10-25 DIAGNOSIS — Z17.0 MALIGNANT NEOPLASM OF UPPER-OUTER QUADRANT OF RIGHT BREAST IN FEMALE, ESTROGEN RECEPTOR POSITIVE: Chronic | ICD-10-CM

## 2023-10-25 DIAGNOSIS — R55 SYNCOPE AND COLLAPSE: ICD-10-CM

## 2023-10-25 DIAGNOSIS — M79.606 PAIN OF LOWER EXTREMITY, UNSPECIFIED LATERALITY: ICD-10-CM

## 2023-10-25 DIAGNOSIS — C50.411 MALIGNANT NEOPLASM OF UPPER-OUTER QUADRANT OF RIGHT BREAST IN FEMALE, ESTROGEN RECEPTOR POSITIVE: Chronic | ICD-10-CM

## 2023-10-25 DIAGNOSIS — N18.31 CHRONIC KIDNEY DISEASE, STAGE 3A: ICD-10-CM

## 2023-10-25 DIAGNOSIS — G47.33 OSA (OBSTRUCTIVE SLEEP APNEA): Chronic | ICD-10-CM

## 2023-10-25 DIAGNOSIS — F33.1 MAJOR DEPRESSIVE DISORDER, RECURRENT EPISODE, MODERATE WITH ANXIOUS DISTRESS: ICD-10-CM

## 2023-10-25 PROCEDURE — 3008F PR BODY MASS INDEX (BMI) DOCUMENTED: ICD-10-PCS | Mod: CPTII,S$GLB,, | Performed by: STUDENT IN AN ORGANIZED HEALTH CARE EDUCATION/TRAINING PROGRAM

## 2023-10-25 PROCEDURE — 1157F ADVNC CARE PLAN IN RCRD: CPT | Mod: CPTII,S$GLB,, | Performed by: STUDENT IN AN ORGANIZED HEALTH CARE EDUCATION/TRAINING PROGRAM

## 2023-10-25 PROCEDURE — 99999 PR PBB SHADOW E&M-EST. PATIENT-LVL V: CPT | Mod: PBBFAC,,, | Performed by: STUDENT IN AN ORGANIZED HEALTH CARE EDUCATION/TRAINING PROGRAM

## 2023-10-25 PROCEDURE — 1101F PR PT FALLS ASSESS DOC 0-1 FALLS W/OUT INJ PAST YR: ICD-10-PCS | Mod: CPTII,S$GLB,, | Performed by: STUDENT IN AN ORGANIZED HEALTH CARE EDUCATION/TRAINING PROGRAM

## 2023-10-25 PROCEDURE — 1159F PR MEDICATION LIST DOCUMENTED IN MEDICAL RECORD: ICD-10-PCS | Mod: CPTII,S$GLB,, | Performed by: STUDENT IN AN ORGANIZED HEALTH CARE EDUCATION/TRAINING PROGRAM

## 2023-10-25 PROCEDURE — 4010F PR ACE/ARB THEARPY RXD/TAKEN: ICD-10-PCS | Mod: CPTII,S$GLB,, | Performed by: STUDENT IN AN ORGANIZED HEALTH CARE EDUCATION/TRAINING PROGRAM

## 2023-10-25 PROCEDURE — 1126F PR PAIN SEVERITY QUANTIFIED, NO PAIN PRESENT: ICD-10-PCS | Mod: CPTII,S$GLB,, | Performed by: STUDENT IN AN ORGANIZED HEALTH CARE EDUCATION/TRAINING PROGRAM

## 2023-10-25 PROCEDURE — 83880 ASSAY OF NATRIURETIC PEPTIDE: CPT | Performed by: STUDENT IN AN ORGANIZED HEALTH CARE EDUCATION/TRAINING PROGRAM

## 2023-10-25 PROCEDURE — 3075F PR MOST RECENT SYSTOLIC BLOOD PRESS GE 130-139MM HG: ICD-10-PCS | Mod: CPTII,S$GLB,, | Performed by: STUDENT IN AN ORGANIZED HEALTH CARE EDUCATION/TRAINING PROGRAM

## 2023-10-25 PROCEDURE — 99999 PR PBB SHADOW E&M-EST. PATIENT-LVL V: ICD-10-PCS | Mod: PBBFAC,,, | Performed by: STUDENT IN AN ORGANIZED HEALTH CARE EDUCATION/TRAINING PROGRAM

## 2023-10-25 PROCEDURE — 3075F SYST BP GE 130 - 139MM HG: CPT | Mod: CPTII,S$GLB,, | Performed by: STUDENT IN AN ORGANIZED HEALTH CARE EDUCATION/TRAINING PROGRAM

## 2023-10-25 PROCEDURE — 4010F ACE/ARB THERAPY RXD/TAKEN: CPT | Mod: CPTII,S$GLB,, | Performed by: STUDENT IN AN ORGANIZED HEALTH CARE EDUCATION/TRAINING PROGRAM

## 2023-10-25 PROCEDURE — 36415 COLL VENOUS BLD VENIPUNCTURE: CPT | Performed by: STUDENT IN AN ORGANIZED HEALTH CARE EDUCATION/TRAINING PROGRAM

## 2023-10-25 PROCEDURE — 3079F DIAST BP 80-89 MM HG: CPT | Mod: CPTII,S$GLB,, | Performed by: STUDENT IN AN ORGANIZED HEALTH CARE EDUCATION/TRAINING PROGRAM

## 2023-10-25 PROCEDURE — 1157F PR ADVANCE CARE PLAN OR EQUIV PRESENT IN MEDICAL RECORD: ICD-10-PCS | Mod: CPTII,S$GLB,, | Performed by: STUDENT IN AN ORGANIZED HEALTH CARE EDUCATION/TRAINING PROGRAM

## 2023-10-25 PROCEDURE — 1159F MED LIST DOCD IN RCRD: CPT | Mod: CPTII,S$GLB,, | Performed by: STUDENT IN AN ORGANIZED HEALTH CARE EDUCATION/TRAINING PROGRAM

## 2023-10-25 PROCEDURE — 99214 PR OFFICE/OUTPT VISIT, EST, LEVL IV, 30-39 MIN: ICD-10-PCS | Mod: S$GLB,,, | Performed by: STUDENT IN AN ORGANIZED HEALTH CARE EDUCATION/TRAINING PROGRAM

## 2023-10-25 PROCEDURE — 99214 OFFICE O/P EST MOD 30 MIN: CPT | Mod: S$GLB,,, | Performed by: STUDENT IN AN ORGANIZED HEALTH CARE EDUCATION/TRAINING PROGRAM

## 2023-10-25 PROCEDURE — 3288F PR FALLS RISK ASSESSMENT DOCUMENTED: ICD-10-PCS | Mod: CPTII,S$GLB,, | Performed by: STUDENT IN AN ORGANIZED HEALTH CARE EDUCATION/TRAINING PROGRAM

## 2023-10-25 PROCEDURE — 3008F BODY MASS INDEX DOCD: CPT | Mod: CPTII,S$GLB,, | Performed by: STUDENT IN AN ORGANIZED HEALTH CARE EDUCATION/TRAINING PROGRAM

## 2023-10-25 PROCEDURE — 1126F AMNT PAIN NOTED NONE PRSNT: CPT | Mod: CPTII,S$GLB,, | Performed by: STUDENT IN AN ORGANIZED HEALTH CARE EDUCATION/TRAINING PROGRAM

## 2023-10-25 PROCEDURE — 1101F PT FALLS ASSESS-DOCD LE1/YR: CPT | Mod: CPTII,S$GLB,, | Performed by: STUDENT IN AN ORGANIZED HEALTH CARE EDUCATION/TRAINING PROGRAM

## 2023-10-25 PROCEDURE — 3079F PR MOST RECENT DIASTOLIC BLOOD PRESSURE 80-89 MM HG: ICD-10-PCS | Mod: CPTII,S$GLB,, | Performed by: STUDENT IN AN ORGANIZED HEALTH CARE EDUCATION/TRAINING PROGRAM

## 2023-10-25 PROCEDURE — 3288F FALL RISK ASSESSMENT DOCD: CPT | Mod: CPTII,S$GLB,, | Performed by: STUDENT IN AN ORGANIZED HEALTH CARE EDUCATION/TRAINING PROGRAM

## 2023-10-25 RX ORDER — HYDRALAZINE HYDROCHLORIDE 25 MG/1
25 TABLET, FILM COATED ORAL 3 TIMES DAILY
COMMUNITY
End: 2024-01-25 | Stop reason: SDUPTHER

## 2023-10-25 NOTE — PROGRESS NOTES
Subjective:   Patient ID:  Susu Luther is a 73 y.o. female who presents for follow up of Shortness of Breath      12/1/20  71 yo female, care establish. Prior cardiologist Dr ackerman   [facility] HTN, CVA (2009), Right breast CA, Lumpectomy 3/2019, h/o PE off OAC 2 yrs ago.  AAA, s/p transcutaneous patch by Dr. Bonds, obesity knee OA imbalanced walker dependent  C/o SOB after walking few steps and dizziness. Had vision issue 1 month ago due to uncontrolled HTN  No chest pain  Sleeps with 2 pillows  Decent appetites  Leg calf pain worse at night  No smoking/drinking  ekg today NSR LVH.    ECH normal EF, grade II DD, LAE and PAP 56 mmHG   Chest CTA negative for PE  BP high    A1c controlled    S/p Open subpectoral lymph node biopsy on the right on 12/02/2020 by Dr. Starks  Still right chest, under arm and shoudler supersensitive pain      Shortness of Breath  Associated symptoms include leg swelling. Pertinent negatives include no abdominal pain, claudication, fever, headaches, neck pain, orthopnea, PND, rash, sputum production, syncope, vomiting or wheezing.   Chest Pain   Pertinent negatives include no abdominal pain, back pain, claudication, cough, diaphoresis, dizziness, fever, headaches, irregular heartbeat, malaise/fatigue, nausea, near-syncope, numbness, orthopnea, palpitations, PND, sputum production, syncope, vomiting or weakness.   Her past medical history is significant for CHF.   Pertinent negatives for past medical history include no seizures.   Congestive Heart Failure  Pertinent negatives include no abdominal pain, claudication, near-syncope or palpitations.     2/28/22  Patient was admitted to [facility] for shortness of breath.  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  Started on heparin drip in transition to oral anticoagulation, now on Eliquis.  Recurrent PE.  On Femara. Has another lump in breast on right side, recently seen  on PET scan.   Due to TANNER, was told to stop hctz, telmisartan.  She has ran out of clonidine. Does not take the ASA, hydralazine, amlodipine.   Blood pressure normotensive.  Reports severe headaches.  Has been out of Fioricet.  Echocardiogram with normal EF  Reports shortness of breath, chest heaviness mostly right-sided.      3/14/22  Still having SOB due to PE.  No bleeding issues while on eliquis.  Chest pain is substernal to right sided.   Lasix doesn't seem to help.   A reports intermittent leg pain right > left.  DVT ultrasound in-hospital with no evidence of DVT.  Following Hematology-Oncology.  Patient does not want surgery if needed for possible breast cancer.  Denies syncope, fever, chills.         4/18/22  Comes in with daughter who lives in [place].  Concerned that she may oxygen issues and may need home oxygen. O2 98%  Reports LE swelling and SOB  Reports chest pain comes on with SOB, did not have chest pain prior to PE  Has not been on lasix, has been held  Reports balancing issues- can do PT once symptoms improve   Denies syncope, fever, chills.       5/16/22  Has been feeling weak last couple days  Feels chest tightness with walking, also CURIEL- feels worse than before. 97% O2 in clinic   Reports dizziness after taking medications   BP low today   Says recurrent cancer may be spreading   No bleeding on eliquis   Reports orthopnea, PND.  Not feeling well- Does not want to the hospital     Denies syncope.      6/13/22  Not feeling well today  Reports chest pain and SOB worsening over the last 2 weeks   Ddimer trending, downTroponin neg and BNP nl on 5/16/22  EKG today without significant abnormalities   Reports recent diagnosis of breast cancer is progressing  Reports right-sided arm weakness  Patient has been increasingly stressed    Denies syncope, lower extremity swelling.      7/6/2022   went to emergency room 6/13/2022, was worked up for stroke  MRI brain negative  Repeat CTA chest without PE,  right-sided mass 6 cm, stable  All blood pressure medications.  Due to hypotension  Started taking Lasix today  Has significant lower extremity pain bilateral, reports blue feet at times      8/9/22  Virtual visit  Ambulates with walker   Had a fall recently due to syncopal episode, went to urgent care, negative workup per patient  Syncopal event occurred while standing up  Last visit Lasix was increased, patient reports increased thirst and water intake  Has also been taking carvedilol, reports low blood pressure  Chest pain worsened after syncopal event, has bruising on her body      9/7/22  Reports leg swelling, left  Has been taking eliquis also   Restarted taking lasix 2 weeks ago  Recently started on lyrica   Still wearing cardiac monitor  Still having SOB and chest discomfort  U/S arterial w/o significant stenosis   BP elevated, high at home  Lost 8 lbs since 9/1      10/12/22  Virtual visit  Doing well, still getting chest pain   Still has shortness of breath but has improved   Was able to go to a football game without any issues   DVT ultrasound without thrombus   Has been treated for gout, improvement of toe pain  Has been having intermittent blood pressure elevated readings at home        2/20/23  Stress test normal   BP elevated now  Has been more tired  Drinking lots of water  Chest pain has improved with imdur  SOB stable   Has chronic pain and chronic fatigue         3/21/23  Patient was recently admitted to the hospital for right arm weakness, TANNER, chest pain, shortness of breath   MRI/CT scan of the brain were unremarkable   CTA did not show PE   Had elevated creatinine, renal ultrasound did not show arterial stenosis  BP noted to be significantly elevated   Blood pressure meds were changed, patient only taking Entresto and amlodipine   Coreg was stopped  Reports right-sided breast lump/mass currently being investigated    Today, reports still having shortness of breath  Waiting to have ultrasound  done for right breast lump  Blood pressure stable today      4/25/23  Virtual visit   Continues to have intermittent chest pain, shortness of breath   Has not fallen since last visit   Blood pressure has been stable   Metanephrine levels are elevated  No bleeding on Eliquis      9/18/23  Virtual visit   Was admitted to [facility] 7/23 for chest pain and recurrent falls and worsening swelling in her legs  Lasix was switched to torsemide  Continues to have leg pain and swelling  Has been seeing Maria Isabel in [facility]  Currently taking torsemide 20 mg b.i.d.  No improvement in her leg pain and leg swelling since discharge from the hospital   Recent DVT ultrasound of right leg with no clot  Has been wearing compression stockings but difficult recently due to leg swelling      9/25/23  Virtual visit   Took metolazone once  Taking torsemide 40 mg b.i.d.  Swelling improving per patient but not a lot pain   CKD mildly worsening on recent labs        9/29/23  Virtual visit  Worsening renal function with taking increased dose of torsemide and metolazone x 2 doses   Swelling has improved and patient now able to walk w/o pain  Was told to hold diuretics for 1 week and have f/u labs  No SOB  Has not gotten contacted by lymphedema clinic at [facility]      10/25/23  Swelling has improved in legs, still having pain with ambulation   Insurance did not cover lymphedema clinic at [facility] or [facility]  Has shortness of breath intermittent        Ekg 7/12/23 NSR, LAFB, LVH  EKG 2/20/23 NSR, PVCs, LAD, minimal LVH  EKG 6/13/22 NSR, LAD, LVH, 93 bpm, qtc 455 ms  EKG 5/16/22 SB, incomplete RBBB, LVH, septal infarct, qtc 422 ms   Echo 2/28/22  The left ventricle is normal in size with concentric hypertrophy and normal systolic function.  The estimated ejection fraction is 60%.  Normal left ventricular diastolic function.  Normal right ventricular size with normal right ventricular systolic function.  Mild to moderate tricuspid regurgitation.  Normal central  venous pressure (3 mmHg).  The estimated PA systolic pressure is 36 mmHg.       Echo 2019  CONCLUSIONS     1 - Mild left atrial enlargement.     2 - Concentric hypertrophy.     3 - No wall motion abnormalities.     4 - Normal left ventricular systolic function (EF 60-65%).     5 - Impaired LV relaxation, elevated LAP (grade 2 diastolic dysfunction).     6 - Normal right ventricular systolic function .     7 - The estimated PA systolic pressure is 36 mmHg.     8 - Mild tricuspid regurgitation.        Past Medical History:   Diagnosis Date    Cataract     Bilateral    Chronic diastolic congestive heart failure 2020    Dizziness 2023    Encounter for blood transfusion     History of repair of aneurysm of abdominal aorta using endovascular stent graft     DR Bonds     of psychiatric care     Hypertension     Major depressive disorder, single episode, moderate with anxious distress 2020    Malignant neoplasm of upper-outer quadrant of right breast in female, estrogen receptor positive 2019    radiation    Psychiatric problem     Sleep apnea     Sleep difficulties     Stroke 2009    no residual defect    Therapy        Past Surgical History:   Procedure Laterality Date    APPENDECTOMY      AXILLARY NODE DISSECTION Right 2020    Procedure: LYMPHADENECTOMY, AXILLARY;  Surgeon: Artemio Starks MD;  Location: [facility] OR;  Service: General;  Laterality: Right;    BIOPSY OF AXILLARY NODE Right 2021    Procedure: BIOPSY, LYMPH NODE, AXILLARY;  Surgeon: Artemio Sutton MD;  Location: [address];  Service: General;  Laterality: Right;    BREAST BIOPSY      2019    BREAST LUMPECTOMY Right     2019     SECTION      x 1    OOPHORECTOMY      right     SENTINEL LYMPH NODE BIOPSY Right 2019    Procedure: BIOPSY, LYMPH NODE, SENTINEL;  Surgeon: Artemio Starks MD;  Location: [facility] OR;  Service: General;  Laterality: Right;    splenic artery aneurysm repair      TONSILLECTOMY    "      Social History     Tobacco Use    Smoking status: Never     Passive exposure: Past    Smokeless tobacco: Never   Substance Use Topics    Alcohol use: No    Drug use: No       Family History   Problem Relation Age of Onset    Diabetes Maternal Grandmother     Diabetes Maternal Grandfather     Diabetes Mother     Hypertension Mother     Sickle cell anemia Daughter     Breast cancer Neg Hx     Colon cancer Neg Hx     Ovarian cancer Neg Hx          Review of Systems   Constitutional: Negative for decreased appetite, diaphoresis, fever, malaise/fatigue and night sweats.   HENT:  Negative for nosebleeds.    Eyes:  Negative for blurred vision and double vision.   Cardiovascular:  Positive for leg swelling. Negative for claudication, irregular heartbeat, near-syncope, orthopnea, palpitations, paroxysmal nocturnal dyspnea and syncope.   Respiratory:  Negative for cough, sleep disturbances due to breathing, snoring, sputum production and wheezing.    Endocrine: Negative for cold intolerance and polyuria.   Hematologic/Lymphatic: Does not bruise/bleed easily.   Skin:  Negative for rash.   Musculoskeletal:  Negative for back pain, falls, joint pain, joint swelling and neck pain.        Right chest breast and shoulder pain   Gastrointestinal:  Negative for abdominal pain, heartburn, nausea and vomiting.   Genitourinary:  Negative for dysuria, frequency and hematuria.   Neurological:  Negative for difficulty with concentration, dizziness, focal weakness, headaches, light-headedness, numbness, seizures and weakness.   Psychiatric/Behavioral:  Negative for depression, memory loss and substance abuse. The patient does not have insomnia.    Allergic/Immunologic: Negative for HIV exposure and hives.     Vitals:    10/25/23 1421   BP: 130/80   BP Location: Left forearm   Patient Position: Sitting   BP Method: Large (Manual)   Pulse: 63   SpO2: 97%   Weight: (!) 145 kg (319 lb 10.7 oz)   Height: 6' 1" (1.854 m) "             Objective:   Physical Exam  Constitutional:       Comments: Mild distress.    HENT:      Head: Normocephalic.   Eyes:      Pupils: Pupils are equal, round, and reactive to light.   Neck:      Thyroid: No thyromegaly.      Vascular: Normal carotid pulses. No carotid bruit or JVD.   Cardiovascular:      Rate and Rhythm: Normal rate and regular rhythm. No extrasystoles are present.     Chest Wall: PMI is not displaced.      Pulses: Normal pulses.      Heart sounds: Normal heart sounds. No murmur heard.     No gallop. No S3 sounds.   Pulmonary:      Effort: No respiratory distress.      Breath sounds: Normal breath sounds. No stridor.   Abdominal:      General: Bowel sounds are normal.      Palpations: Abdomen is soft.      Tenderness: There is no abdominal tenderness. There is no rebound.   Musculoskeletal:         General: Swelling present. Normal range of motion.      Comments: Tender to palpitation   Skin:     Findings: No rash.   Neurological:      Mental Status: She is alert and oriented to person, place, and time.   Psychiatric:         Behavior: Behavior normal.         Lab Results   Component Value Date    CHOL 234 (H) 02/28/2023    CHOL 229 (H) 04/18/2022    CHOL 221 (H) 10/19/2020     Lab Results   Component Value Date    HDL 44 02/28/2023    HDL 45 04/18/2022    HDL 55 10/19/2020     Lab Results   Component Value Date    LDLCALC 151.8 02/28/2023    LDLCALC 150.4 04/18/2022    LDLCALC 137.4 10/19/2020     Lab Results   Component Value Date    TRIG 191 (H) 02/28/2023    TRIG 168 (H) 04/18/2022    TRIG 143 10/19/2020     Lab Results   Component Value Date    CHOLHDL 18.8 (L) 02/28/2023    CHOLHDL 19.7 (L) 04/18/2022    CHOLHDL 24.9 10/19/2020       Chemistry        Component Value Date/Time     10/11/2023 1040    K 4.5 10/11/2023 1040     10/11/2023 1040    CO2 23 10/11/2023 1040    BUN 28 (H) 10/11/2023 1040    CREATININE 1.5 (H) 10/11/2023 1040    GLU 90 10/11/2023 1040         Component Value Date/Time    CALCIUM 8.7 10/11/2023 1040    ALKPHOS 96 10/11/2023 1040    AST 11 10/11/2023 1040    ALT 9 (L) 10/11/2023 1040    BILITOT 0.3 10/11/2023 1040    ESTGFRAFRICA 37 (A) 07/06/2022 1312    EGFRNONAA 32 (A) 07/06/2022 1312          Lab Results   Component Value Date    HGBA1C 5.9 (H) 05/18/2019     Lab Results   Component Value Date    TSH 1.691 03/10/2023     Lab Results   Component Value Date    INR 1.1 03/11/2023    INR 0.9 02/15/2022    INR 1.0 11/10/2020     Lab Results   Component Value Date    WBC 9.05 08/03/2023    HGB 11.8 (L) 08/03/2023    HCT 37.2 08/03/2023    MCV 81 (L) 08/03/2023     08/03/2023     BMP  Sodium   Date Value Ref Range Status   10/11/2023 140 136 - 145 mmol/L Final     Potassium   Date Value Ref Range Status   10/11/2023 4.5 3.5 - 5.1 mmol/L Final     Chloride   Date Value Ref Range Status   10/11/2023 110 95 - 110 mmol/L Final     CO2   Date Value Ref Range Status   10/11/2023 23 23 - 29 mmol/L Final     BUN   Date Value Ref Range Status   10/11/2023 28 (H) 8 - 23 mg/dL Final     Creatinine   Date Value Ref Range Status   10/11/2023 1.5 (H) 0.5 - 1.4 mg/dL Final     Calcium   Date Value Ref Range Status   10/11/2023 8.7 8.7 - 10.5 mg/dL Final     Anion Gap   Date Value Ref Range Status   10/11/2023 7 (L) 8 - 16 mmol/L Final     eGFR if    Date Value Ref Range Status   07/06/2022 37 (A) >60 mL/min/1.73 m^2 Final     eGFR if non    Date Value Ref Range Status   07/06/2022 32 (A) >60 mL/min/1.73 m^2 Final     Comment:     Calculation used to obtain the estimated glomerular filtration  rate (eGFR) is the CKD-EPI equation.        BNP  @LABRCNTIP(BNP,BNPTRIAGEBLO)@  @LABRCNTIP(troponini)@  CrCl cannot be calculated (Patient's most recent lab result is older than the maximum 7 days allowed.).  No results found in the last 24 hours.  No results found in the last 24 hours.  No results found in the last 24 hours.    Assessment:       1. Hypertension, unspecified type    2. Chronic diastolic congestive heart failure    3. NILS (obstructive sleep apnea)    4. Class 3 severe obesity with serious comorbidity and body mass index (BMI) of 40.0 to 44.9 in adult, unspecified obesity type    5. Malignant neoplasm of upper-outer quadrant of right breast in female, estrogen receptor positive    6. History of pulmonary embolism    7. Syncope and collapse    8. Major depressive disorder, recurrent episode, moderate with anxious distress    9. Neuropathic pain    10. Chronic kidney disease, stage 3a    11. Pain of lower extremity, unspecified laterality    12. Abdominal aortic aneurysm (AAA) without rupture, unspecified part          Plan:     Left leg swelling/edema- chronic  Torsemide 40 mg bid- obtain labs  Stopped Metolazone   Venous ultrasound 10/22 and 9/23 without DVT  lymphedema clinic at [facility]- was not covered by insurance  Will refer to another lymphedema clinic    Diastolic heart failure  Echo 3/23 with normal EF, mild-mod TR    Hypertension  Stable but significant fluctuations  Continue Entresto, amlodipine, hydralazine   Elevated metanephrines- will re-refer if fluctuations continue      Chest pain/shortness of breath-stable  CTA 3/23 did not show PE   Stress test 1/23 normal stress test     Syncope- resolved   14 day monitor 8/22- 7.27% PACs, essentially asymptomatic    History of right breast cancer  Follows with Hematology-Oncology    Right leg pain-stable  DVT ultrasound 10/22 without evidence of DVT   Normal ABIs 3/22  Arterial ultrasound 8/22 without significant stenosis    Recurrent pulmonary embolus   Recurrent PE as of 2/22  Continue OAC    History of CVA  Carotid ultrasound 6/22 no significant stenosis, MRI brain 6/22 no abnormality  Currently not on aspirin as she is taking Eliquis  Continue statin    HLD   as of 2/23  Continue statin    AAA s/p transcutaneous patch  Stable    Morbid obesity, BMI > 40  Low-salt, low-fat  diet  Exercise as tolerated      All labs and cardiac procedures reviewed.      Return to clinic 3 months     Arlene Hobbs MD  Cardiology Staff

## 2023-10-26 ENCOUNTER — DOCUMENT SCAN (OUTPATIENT)
Dept: HOME HEALTH SERVICES | Facility: HOSPITAL | Age: 73
End: 2023-10-26
Payer: MEDICARE

## 2023-10-26 LAB — BNP SERPL-MCNC: 60 PG/ML (ref 0–99)

## 2023-11-07 ENCOUNTER — OFFICE VISIT (OUTPATIENT)
Dept: INTERNAL MEDICINE | Facility: CLINIC | Age: 73
End: 2023-11-07
Payer: MEDICARE

## 2023-11-07 VITALS
HEART RATE: 79 BPM | WEIGHT: 293 LBS | OXYGEN SATURATION: 100 % | SYSTOLIC BLOOD PRESSURE: 108 MMHG | TEMPERATURE: 98 F | BODY MASS INDEX: 41.45 KG/M2 | RESPIRATION RATE: 22 BRPM | DIASTOLIC BLOOD PRESSURE: 74 MMHG

## 2023-11-07 DIAGNOSIS — F33.1 MAJOR DEPRESSIVE DISORDER, RECURRENT EPISODE, MODERATE WITH ANXIOUS DISTRESS: ICD-10-CM

## 2023-11-07 DIAGNOSIS — F41.1 GENERALIZED ANXIETY DISORDER: Primary | ICD-10-CM

## 2023-11-07 DIAGNOSIS — G44.229 CHRONIC TENSION-TYPE HEADACHE, NOT INTRACTABLE: ICD-10-CM

## 2023-11-07 DIAGNOSIS — R23.2 HOT FLASHES: ICD-10-CM

## 2023-11-07 PROCEDURE — 1157F PR ADVANCE CARE PLAN OR EQUIV PRESENT IN MEDICAL RECORD: ICD-10-PCS | Mod: HCNC,CPTII,S$GLB, | Performed by: FAMILY MEDICINE

## 2023-11-07 PROCEDURE — 3074F PR MOST RECENT SYSTOLIC BLOOD PRESSURE < 130 MM HG: ICD-10-PCS | Mod: HCNC,CPTII,S$GLB, | Performed by: FAMILY MEDICINE

## 2023-11-07 PROCEDURE — 99214 OFFICE O/P EST MOD 30 MIN: CPT | Mod: HCNC,S$GLB,, | Performed by: FAMILY MEDICINE

## 2023-11-07 PROCEDURE — 4010F PR ACE/ARB THEARPY RXD/TAKEN: ICD-10-PCS | Mod: HCNC,CPTII,S$GLB, | Performed by: FAMILY MEDICINE

## 2023-11-07 PROCEDURE — 1101F PR PT FALLS ASSESS DOC 0-1 FALLS W/OUT INJ PAST YR: ICD-10-PCS | Mod: HCNC,CPTII,S$GLB, | Performed by: FAMILY MEDICINE

## 2023-11-07 PROCEDURE — 99999 PR PBB SHADOW E&M-EST. PATIENT-LVL V: ICD-10-PCS | Mod: PBBFAC,HCNC,, | Performed by: FAMILY MEDICINE

## 2023-11-07 PROCEDURE — 3078F DIAST BP <80 MM HG: CPT | Mod: HCNC,CPTII,S$GLB, | Performed by: FAMILY MEDICINE

## 2023-11-07 PROCEDURE — 3078F PR MOST RECENT DIASTOLIC BLOOD PRESSURE < 80 MM HG: ICD-10-PCS | Mod: HCNC,CPTII,S$GLB, | Performed by: FAMILY MEDICINE

## 2023-11-07 PROCEDURE — 99999 PR PBB SHADOW E&M-EST. PATIENT-LVL V: CPT | Mod: PBBFAC,HCNC,, | Performed by: FAMILY MEDICINE

## 2023-11-07 PROCEDURE — 1159F PR MEDICATION LIST DOCUMENTED IN MEDICAL RECORD: ICD-10-PCS | Mod: HCNC,CPTII,S$GLB, | Performed by: FAMILY MEDICINE

## 2023-11-07 PROCEDURE — 1157F ADVNC CARE PLAN IN RCRD: CPT | Mod: HCNC,CPTII,S$GLB, | Performed by: FAMILY MEDICINE

## 2023-11-07 PROCEDURE — 1160F RVW MEDS BY RX/DR IN RCRD: CPT | Mod: HCNC,CPTII,S$GLB, | Performed by: FAMILY MEDICINE

## 2023-11-07 PROCEDURE — 1159F MED LIST DOCD IN RCRD: CPT | Mod: HCNC,CPTII,S$GLB, | Performed by: FAMILY MEDICINE

## 2023-11-07 PROCEDURE — 1125F AMNT PAIN NOTED PAIN PRSNT: CPT | Mod: HCNC,CPTII,S$GLB, | Performed by: FAMILY MEDICINE

## 2023-11-07 PROCEDURE — 1125F PR PAIN SEVERITY QUANTIFIED, PAIN PRESENT: ICD-10-PCS | Mod: HCNC,CPTII,S$GLB, | Performed by: FAMILY MEDICINE

## 2023-11-07 PROCEDURE — 3074F SYST BP LT 130 MM HG: CPT | Mod: HCNC,CPTII,S$GLB, | Performed by: FAMILY MEDICINE

## 2023-11-07 PROCEDURE — 1160F PR REVIEW ALL MEDS BY PRESCRIBER/CLIN PHARMACIST DOCUMENTED: ICD-10-PCS | Mod: HCNC,CPTII,S$GLB, | Performed by: FAMILY MEDICINE

## 2023-11-07 PROCEDURE — 3288F FALL RISK ASSESSMENT DOCD: CPT | Mod: HCNC,CPTII,S$GLB, | Performed by: FAMILY MEDICINE

## 2023-11-07 PROCEDURE — 3008F BODY MASS INDEX DOCD: CPT | Mod: HCNC,CPTII,S$GLB, | Performed by: FAMILY MEDICINE

## 2023-11-07 PROCEDURE — 3288F PR FALLS RISK ASSESSMENT DOCUMENTED: ICD-10-PCS | Mod: HCNC,CPTII,S$GLB, | Performed by: FAMILY MEDICINE

## 2023-11-07 PROCEDURE — 1101F PT FALLS ASSESS-DOCD LE1/YR: CPT | Mod: HCNC,CPTII,S$GLB, | Performed by: FAMILY MEDICINE

## 2023-11-07 PROCEDURE — 4010F ACE/ARB THERAPY RXD/TAKEN: CPT | Mod: HCNC,CPTII,S$GLB, | Performed by: FAMILY MEDICINE

## 2023-11-07 PROCEDURE — 99214 PR OFFICE/OUTPT VISIT, EST, LEVL IV, 30-39 MIN: ICD-10-PCS | Mod: HCNC,S$GLB,, | Performed by: FAMILY MEDICINE

## 2023-11-07 PROCEDURE — 3008F PR BODY MASS INDEX (BMI) DOCUMENTED: ICD-10-PCS | Mod: HCNC,CPTII,S$GLB, | Performed by: FAMILY MEDICINE

## 2023-11-07 RX ORDER — BUTALBITAL, ACETAMINOPHEN AND CAFFEINE 50; 325; 40 MG/1; MG/1; MG/1
1 TABLET ORAL EVERY 6 HOURS PRN
Qty: 20 TABLET | Refills: 0 | Status: SHIPPED | OUTPATIENT
Start: 2023-11-07 | End: 2023-11-20 | Stop reason: SDUPTHER

## 2023-11-07 RX ORDER — VENLAFAXINE HYDROCHLORIDE 37.5 MG/1
37.5 CAPSULE, EXTENDED RELEASE ORAL DAILY
Qty: 30 CAPSULE | Refills: 11 | Status: SHIPPED | OUTPATIENT
Start: 2023-11-07 | End: 2024-11-06

## 2023-11-07 NOTE — PROGRESS NOTES
Subjective:       Patient ID: Susu Luther is a 73 y.o. female.    Chief Complaint: Follow-up (Pt c/o dizziness )    Susu Luther is 73 y.o. female who presents for follow-up. Reports no change in her swelling, saw Cardiology recently who is hoping to get her establish in a lymphedema clinic. Discussed discontinuation of amlodipine but wanted to discuss medication with me first.     Stopped taking buproprion XL due to not liking how it made her feel, takes xanax for chronic anxiety    Reports mild improvement in hot flashes but still persistent.     States she is having an issue with adding me as her PCP through [employer]   Continues to have headaches, would like to resume Rae    Reports shortness of breath with exertion and decreased endurance for walking long distances, walking with assistance of cane today, walks with rolling walker for long distances      Review of Systems   Constitutional:  Negative for chills and fever.   HENT:  Negative for congestion, rhinorrhea and sore throat.    Respiratory:  Positive for shortness of breath. Negative for cough and wheezing.    Cardiovascular:  Positive for leg swelling. Negative for chest pain and palpitations.   Gastrointestinal:  Negative for abdominal pain, constipation, diarrhea, nausea and vomiting.   Genitourinary:  Negative for dysuria, frequency and urgency.   Neurological:  Positive for headaches.   Psychiatric/Behavioral:  Negative for dysphoric mood.        Objective:      Physical Exam  HENT:      Head: Normocephalic and atraumatic.      Nose: No congestion or rhinorrhea.   Eyes:      General: No scleral icterus.        Right eye: No discharge.         Left eye: No discharge.      Extraocular Movements: Extraocular movements intact.      Conjunctiva/sclera: Conjunctivae normal.      Pupils: Pupils are equal, round, and reactive to light.   Cardiovascular:      Rate and Rhythm: Normal rate and regular rhythm.      Heart sounds: No  murmur heard.     No friction rub. No gallop.   Pulmonary:      Effort: Pulmonary effort is normal. No respiratory distress.      Breath sounds: Normal breath sounds. No stridor. No wheezing or rhonchi.   Abdominal:      General: Bowel sounds are normal.      Palpations: Abdomen is soft.   Musculoskeletal:      Right lower leg: Edema present.      Left lower leg: Edema present.   Skin:     General: Skin is warm.   Neurological:      General: No focal deficit present.      Mental Status: She is alert.   Psychiatric:         Mood and Affect: Mood normal.         Behavior: Behavior normal.         Assessment:       1. Generalized anxiety disorder    2. Major depressive disorder, recurrent episode, moderate with anxious distress    3. Hot flashes    4. Chronic tension-type headache, not intractable        Plan:   1. Generalized anxiety disorder  Chronic, uncontrolled, will recommend initiation of Effexor XR  -     venlafaxine (EFFEXOR-XR) 37.5 MG 24 hr capsule; Take 1 capsule (37.5 mg total) by mouth once daily.  Dispense: 30 capsule; Refill: 11    2. Major depressive disorder, recurrent episode, moderate with anxious distress  Overview:  Recently started taking Cymbalta 30mg daily and plan to titrate to effect for mood and neuropathic pain.     Recommend re-establishing with psychiatry for CBT    Did not like Klonopin and would like to rotate to Xanax for now. Recommend discontinuation of Xanax for chronic use and start Effexor    Orders:  -     venlafaxine (EFFEXOR-XR) 37.5 MG 24 hr capsule; Take 1 capsule (37.5 mg total) by mouth once daily.  Dispense: 30 capsule; Refill: 11    3. Hot flashes  -     venlafaxine (EFFEXOR-XR) 37.5 MG 24 hr capsule; Take 1 capsule (37.5 mg total) by mouth once daily.  Dispense: 30 capsule; Refill: 11    4. Chronic tension-type headache, not intractable  Chronic, uncontrolled, will resume Floricet  -     butalbital-acetaminophen-caffeine -40 mg (FIORICET, ESGIC) -40 mg per  tablet; Take 1 tablet by mouth every 6 (six) hours as needed for Headaches.  Dispense: 20 tablet; Refill: 0           Follow up in about 1 month (around 12/7/2023) for follow-up.    Rubi Littlejohn MD  Family Medicine

## 2023-11-08 DIAGNOSIS — M79.604 PAIN IN BOTH LOWER EXTREMITIES: ICD-10-CM

## 2023-11-08 DIAGNOSIS — R79.89 LOW VITAMIN D LEVEL: ICD-10-CM

## 2023-11-08 DIAGNOSIS — I10 HYPERTENSION, UNSPECIFIED TYPE: ICD-10-CM

## 2023-11-08 DIAGNOSIS — I26.99 ACUTE PULMONARY EMBOLISM, UNSPECIFIED PULMONARY EMBOLISM TYPE, UNSPECIFIED WHETHER ACUTE COR PULMONALE PRESENT: ICD-10-CM

## 2023-11-08 DIAGNOSIS — M79.605 PAIN IN BOTH LOWER EXTREMITIES: ICD-10-CM

## 2023-11-08 RX ORDER — TAMSULOSIN HYDROCHLORIDE 0.4 MG/1
0.4 CAPSULE ORAL DAILY
Qty: 30 CAPSULE | Refills: 0 | Status: SHIPPED | OUTPATIENT
Start: 2023-11-08 | End: 2024-04-03

## 2023-11-08 RX ORDER — SACUBITRIL AND VALSARTAN 24; 26 MG/1; MG/1
1 TABLET, FILM COATED ORAL 2 TIMES DAILY
Qty: 120 TABLET | Refills: 2 | Status: SHIPPED | OUTPATIENT
Start: 2023-11-08 | End: 2024-02-26 | Stop reason: SDUPTHER

## 2023-11-08 RX ORDER — ASPIRIN 325 MG
50000 TABLET, DELAYED RELEASE (ENTERIC COATED) ORAL WEEKLY
Qty: 12 CAPSULE | Refills: 0 | Status: SHIPPED | OUTPATIENT
Start: 2023-11-08 | End: 2023-12-18 | Stop reason: SDUPTHER

## 2023-11-09 RX ORDER — TORSEMIDE 20 MG/1
40 TABLET ORAL 2 TIMES DAILY
Qty: 120 TABLET | Refills: 0 | Status: SHIPPED | OUTPATIENT
Start: 2023-11-09 | End: 2024-01-30

## 2023-11-09 RX ORDER — TRAMADOL HYDROCHLORIDE 100 MG/1
100 TABLET, EXTENDED RELEASE ORAL DAILY
Qty: 90 TABLET | Refills: 0 | Status: SHIPPED | OUTPATIENT
Start: 2023-11-09 | End: 2023-12-04 | Stop reason: SDUPTHER

## 2023-11-14 ENCOUNTER — PATIENT MESSAGE (OUTPATIENT)
Dept: ADMINISTRATIVE | Facility: HOSPITAL | Age: 73
End: 2023-11-14
Payer: MEDICARE

## 2023-11-16 ENCOUNTER — OUTPATIENT CASE MANAGEMENT (OUTPATIENT)
Dept: ADMINISTRATIVE | Facility: OTHER | Age: 73
End: 2023-11-16
Payer: MEDICARE

## 2023-11-20 ENCOUNTER — PATIENT OUTREACH (OUTPATIENT)
Dept: ADMINISTRATIVE | Facility: HOSPITAL | Age: 73
End: 2023-11-20
Payer: MEDICARE

## 2023-11-20 ENCOUNTER — PATIENT MESSAGE (OUTPATIENT)
Dept: INTERNAL MEDICINE | Facility: CLINIC | Age: 73
End: 2023-11-20
Payer: MEDICARE

## 2023-11-20 DIAGNOSIS — G44.229 CHRONIC TENSION-TYPE HEADACHE, NOT INTRACTABLE: ICD-10-CM

## 2023-11-20 DIAGNOSIS — Z12.11 COLON CANCER SCREENING: Primary | ICD-10-CM

## 2023-11-20 RX ORDER — BUTALBITAL, ACETAMINOPHEN AND CAFFEINE 50; 325; 40 MG/1; MG/1; MG/1
1 TABLET ORAL EVERY 6 HOURS PRN
Qty: 45 TABLET | Refills: 1 | Status: SHIPPED | OUTPATIENT
Start: 2023-11-20 | End: 2023-12-18 | Stop reason: SDUPTHER

## 2023-11-20 NOTE — PROGRESS NOTES
Pt responded to portal message requesting fit kit, will send [facility] request to mail out.

## 2023-11-20 NOTE — PROGRESS NOTES
EPIC CAMPAIGN: per portal response pt is requesting colon ca screen, will verify with pt which screening option she would like?

## 2023-11-29 ENCOUNTER — PATIENT MESSAGE (OUTPATIENT)
Dept: INTERNAL MEDICINE | Facility: CLINIC | Age: 73
End: 2023-11-29
Payer: MEDICARE

## 2023-11-30 DIAGNOSIS — G89.3 NEOPLASM RELATED PAIN: ICD-10-CM

## 2023-11-30 RX ORDER — INFLUENZA A VIRUS A/VICTORIA/4897/2022 IVR-238 (H1N1) ANTIGEN (FORMALDEHYDE INACTIVATED), INFLUENZA A VIRUS A/DARWIN/9/2021 SAN-010 (H3N2) ANTIGEN (FORMALDEHYDE INACTIVATED), INFLUENZA B VIRUS B/PHUKET/3073/2013 ANTIGEN (FORMALDEHYDE INACTIVATED), AND INFLUENZA B VIRUS B/MICHIGAN/01/2021 ANTIGEN (FORMALDEHYDE INACTIVATED) 60; 60; 60; 60 UG/.7ML; UG/.7ML; UG/.7ML; UG/.7ML
INJECTION, SUSPENSION INTRAMUSCULAR
COMMUNITY
Start: 2023-09-19 | End: 2024-02-06

## 2023-11-30 RX ORDER — ZOSTER VACCINE RECOMBINANT, ADJUVANTED 50 MCG/0.5
KIT INTRAMUSCULAR
COMMUNITY
Start: 2023-09-19 | End: 2024-02-06

## 2023-11-30 NOTE — TELEPHONE ENCOUNTER
I pended this medication after speaking to the pt and moving her appt up to 12/05/23, she never received the tramadol yet from [facility] and wanted to see if possible you can refill her percocet prescribed by previous PCP  for neoplasm related pain Dx code G89.3. please advise . Thanks //kah

## 2023-12-04 ENCOUNTER — OFFICE VISIT (OUTPATIENT)
Dept: INTERNAL MEDICINE | Facility: CLINIC | Age: 73
End: 2023-12-04
Payer: MEDICARE

## 2023-12-04 ENCOUNTER — CLINICAL SUPPORT (OUTPATIENT)
Dept: INTERNAL MEDICINE | Facility: CLINIC | Age: 73
End: 2023-12-04
Payer: MEDICARE

## 2023-12-04 VITALS
WEIGHT: 293 LBS | RESPIRATION RATE: 20 BRPM | TEMPERATURE: 98 F | DIASTOLIC BLOOD PRESSURE: 66 MMHG | OXYGEN SATURATION: 98 % | SYSTOLIC BLOOD PRESSURE: 112 MMHG | HEART RATE: 76 BPM | BODY MASS INDEX: 41.24 KG/M2

## 2023-12-04 DIAGNOSIS — R42 DIZZINESS: Primary | ICD-10-CM

## 2023-12-04 DIAGNOSIS — R42 DIZZINESS AND GIDDINESS: ICD-10-CM

## 2023-12-04 DIAGNOSIS — M79.604 PAIN IN BOTH LOWER EXTREMITIES: ICD-10-CM

## 2023-12-04 DIAGNOSIS — N64.4 BREAST PAIN, RIGHT: ICD-10-CM

## 2023-12-04 DIAGNOSIS — M79.605 PAIN IN BOTH LOWER EXTREMITIES: ICD-10-CM

## 2023-12-04 PROCEDURE — 93010 EKG 12-LEAD: ICD-10-PCS | Mod: HCNC,S$GLB,, | Performed by: INTERNAL MEDICINE

## 2023-12-04 PROCEDURE — 3288F PR FALLS RISK ASSESSMENT DOCUMENTED: ICD-10-PCS | Mod: HCNC,CPTII,S$GLB, | Performed by: FAMILY MEDICINE

## 2023-12-04 PROCEDURE — 3008F BODY MASS INDEX DOCD: CPT | Mod: HCNC,CPTII,S$GLB, | Performed by: FAMILY MEDICINE

## 2023-12-04 PROCEDURE — 99214 PR OFFICE/OUTPT VISIT, EST, LEVL IV, 30-39 MIN: ICD-10-PCS | Mod: HCNC,S$GLB,, | Performed by: FAMILY MEDICINE

## 2023-12-04 PROCEDURE — 1159F MED LIST DOCD IN RCRD: CPT | Mod: HCNC,CPTII,S$GLB, | Performed by: FAMILY MEDICINE

## 2023-12-04 PROCEDURE — 1159F PR MEDICATION LIST DOCUMENTED IN MEDICAL RECORD: ICD-10-PCS | Mod: HCNC,CPTII,S$GLB, | Performed by: FAMILY MEDICINE

## 2023-12-04 PROCEDURE — 1125F AMNT PAIN NOTED PAIN PRSNT: CPT | Mod: HCNC,CPTII,S$GLB, | Performed by: FAMILY MEDICINE

## 2023-12-04 PROCEDURE — 1157F PR ADVANCE CARE PLAN OR EQUIV PRESENT IN MEDICAL RECORD: ICD-10-PCS | Mod: HCNC,CPTII,S$GLB, | Performed by: FAMILY MEDICINE

## 2023-12-04 PROCEDURE — 1101F PR PT FALLS ASSESS DOC 0-1 FALLS W/OUT INJ PAST YR: ICD-10-PCS | Mod: HCNC,CPTII,S$GLB, | Performed by: FAMILY MEDICINE

## 2023-12-04 PROCEDURE — 3078F DIAST BP <80 MM HG: CPT | Mod: HCNC,CPTII,S$GLB, | Performed by: FAMILY MEDICINE

## 2023-12-04 PROCEDURE — 93010 ELECTROCARDIOGRAM REPORT: CPT | Mod: HCNC,S$GLB,, | Performed by: INTERNAL MEDICINE

## 2023-12-04 PROCEDURE — 99999 PR PBB SHADOW E&M-EST. PATIENT-LVL V: ICD-10-PCS | Mod: PBBFAC,HCNC,, | Performed by: FAMILY MEDICINE

## 2023-12-04 PROCEDURE — 3288F FALL RISK ASSESSMENT DOCD: CPT | Mod: HCNC,CPTII,S$GLB, | Performed by: FAMILY MEDICINE

## 2023-12-04 PROCEDURE — 3074F SYST BP LT 130 MM HG: CPT | Mod: HCNC,CPTII,S$GLB, | Performed by: FAMILY MEDICINE

## 2023-12-04 PROCEDURE — 1157F ADVNC CARE PLAN IN RCRD: CPT | Mod: HCNC,CPTII,S$GLB, | Performed by: FAMILY MEDICINE

## 2023-12-04 PROCEDURE — 99214 OFFICE O/P EST MOD 30 MIN: CPT | Mod: HCNC,S$GLB,, | Performed by: FAMILY MEDICINE

## 2023-12-04 PROCEDURE — 3078F PR MOST RECENT DIASTOLIC BLOOD PRESSURE < 80 MM HG: ICD-10-PCS | Mod: HCNC,CPTII,S$GLB, | Performed by: FAMILY MEDICINE

## 2023-12-04 PROCEDURE — 4010F PR ACE/ARB THEARPY RXD/TAKEN: ICD-10-PCS | Mod: HCNC,CPTII,S$GLB, | Performed by: FAMILY MEDICINE

## 2023-12-04 PROCEDURE — 3008F PR BODY MASS INDEX (BMI) DOCUMENTED: ICD-10-PCS | Mod: HCNC,CPTII,S$GLB, | Performed by: FAMILY MEDICINE

## 2023-12-04 PROCEDURE — 1125F PR PAIN SEVERITY QUANTIFIED, PAIN PRESENT: ICD-10-PCS | Mod: HCNC,CPTII,S$GLB, | Performed by: FAMILY MEDICINE

## 2023-12-04 PROCEDURE — 3074F PR MOST RECENT SYSTOLIC BLOOD PRESSURE < 130 MM HG: ICD-10-PCS | Mod: HCNC,CPTII,S$GLB, | Performed by: FAMILY MEDICINE

## 2023-12-04 PROCEDURE — 1101F PT FALLS ASSESS-DOCD LE1/YR: CPT | Mod: HCNC,CPTII,S$GLB, | Performed by: FAMILY MEDICINE

## 2023-12-04 PROCEDURE — 4010F ACE/ARB THERAPY RXD/TAKEN: CPT | Mod: HCNC,CPTII,S$GLB, | Performed by: FAMILY MEDICINE

## 2023-12-04 PROCEDURE — 99999 PR PBB SHADOW E&M-EST. PATIENT-LVL V: CPT | Mod: PBBFAC,HCNC,, | Performed by: FAMILY MEDICINE

## 2023-12-04 RX ORDER — TRAMADOL HYDROCHLORIDE 100 MG/1
100 TABLET, EXTENDED RELEASE ORAL DAILY
Qty: 90 TABLET | Refills: 0 | Status: SHIPPED | OUTPATIENT
Start: 2023-12-04 | End: 2024-01-04 | Stop reason: SDUPTHER

## 2023-12-04 RX ORDER — TRAMADOL HYDROCHLORIDE 100 MG/1
100 TABLET, EXTENDED RELEASE ORAL DAILY
Qty: 90 TABLET | Refills: 0 | Status: CANCELLED | OUTPATIENT
Start: 2023-12-04

## 2023-12-04 RX ORDER — OXYCODONE AND ACETAMINOPHEN 7.5; 325 MG/1; MG/1
1 TABLET ORAL EVERY 6 HOURS PRN
Qty: 60 TABLET | Refills: 0 | OUTPATIENT
Start: 2023-12-04

## 2023-12-04 NOTE — PROGRESS NOTES
Subjective:       Patient ID: Susu Luther is a 73 y.o. female.    Chief Complaint: Follow-up    Susu Luther is 73 y.o. female who presents s/p fall on Wednesday, was fixing coffee and does not remember the fall. Had issues with multiple cancelled Neurology appointments, next appointment schedule 3/7/2024 in [place]. Would not cover leg wraps. She says she feels dizzy today and has a headache. Has history of headache, takes floricet, but needs to      Received shingles and flu   Throbbing quality headache that starts in the posterior head. Has history of stroke in 2009     Does not recall palpitations.   Noted improvement in hot flashes, noted much better, decreased frequency     Follow-up      Review of Systems    Objective:      Physical Exam  Vitals reviewed.   HENT:      Head: Normocephalic and atraumatic.      Nose: No congestion or rhinorrhea.   Eyes:      General: No scleral icterus.        Right eye: No discharge.         Left eye: No discharge.      Extraocular Movements: Extraocular movements intact.      Conjunctiva/sclera: Conjunctivae normal.      Pupils: Pupils are equal, round, and reactive to light.   Cardiovascular:      Rate and Rhythm: Normal rate and regular rhythm.      Heart sounds: No murmur heard.     No friction rub. No gallop.   Pulmonary:      Effort: Pulmonary effort is normal. No respiratory distress.      Breath sounds: Normal breath sounds. No stridor. No wheezing or rhonchi.   Abdominal:      General: Bowel sounds are normal.      Palpations: Abdomen is soft.   Musculoskeletal:      Right lower leg: No edema.      Left lower leg: No edema.   Skin:     General: Skin is warm.   Neurological:      General: No focal deficit present.      Mental Status: She is alert and oriented to person, place, and time.      Cranial Nerves: No cranial nerve deficit.      Sensory: No sensory deficit.      Motor: Weakness present.      Coordination: Coordination normal.       Gait: Gait abnormal.      Deep Tendon Reflexes: Reflexes normal.   Psychiatric:         Mood and Affect: Mood normal.         Behavior: Behavior normal.         Assessment:       1. Dizziness    2. Dizziness and giddiness    3. Pain in both lower extremities    4. Breast pain, right        Plan:   1. Dizziness  -     CT Head Without Contrast; Future; Expected date: 12/04/2023  -     IN OFFICE EKG 12-LEAD (to [place])    2. Dizziness and giddiness  -     CT Head Without Contrast; Future; Expected date: 12/04/2023  -     IN OFFICE EKG 12-LEAD (to [place])  -     Ambulatory referral/consult to Physical/Occupational Therapy; Future; Expected date: 12/11/2023    3. Pain in both lower extremities  -     traMADoL (ULTRAM-ER) 100 MG Tb24; Take 1 tablet (100 mg total) by mouth once daily.  Dispense: 90 tablet; Refill: 0    4. Breast pain, right  -     Mammo Digital Diagnostic Bilat; Future; Expected date: 12/04/2023       Future Appointments   Date Time Provider Department Center   12/26/2023 11:20 AM Virginia Vincent PA-C [facility]   1/10/2024 11:20 AM Arlene Hobbs MD [facility]   2/9/2024 11:15 AM LABORATORY, [facility]   2/16/2024  2:30 PM Jaylyn Kraft FNP [facility] HEM ON[facility]   3/7/2024  9:20 AM Gwyn Griffith MD [facility] [place]         Rubi Littlejohn MD  Family Medicine

## 2023-12-04 NOTE — PROGRESS NOTES
EKG performed as ordered. Pt tolerated well without any issues. No s/s distress noted. Results given to provider for review.

## 2023-12-05 ENCOUNTER — PATIENT MESSAGE (OUTPATIENT)
Dept: ADMINISTRATIVE | Facility: HOSPITAL | Age: 73
End: 2023-12-05
Payer: MEDICARE

## 2023-12-05 ENCOUNTER — PATIENT MESSAGE (OUTPATIENT)
Dept: INTERNAL MEDICINE | Facility: CLINIC | Age: 73
End: 2023-12-05
Payer: MEDICARE

## 2023-12-05 DIAGNOSIS — R60.0 BILATERAL LOWER EXTREMITY EDEMA: Primary | ICD-10-CM

## 2023-12-05 DIAGNOSIS — I50.32 CHRONIC DIASTOLIC CONGESTIVE HEART FAILURE: ICD-10-CM

## 2023-12-07 PROBLEM — R94.31 PROLONGED Q-T INTERVAL ON ECG: Status: ACTIVE | Noted: 2023-12-07

## 2023-12-08 ENCOUNTER — HOSPITAL ENCOUNTER (OUTPATIENT)
Dept: RADIOLOGY | Facility: HOSPITAL | Age: 73
Discharge: HOME | End: 2023-12-08
Attending: FAMILY MEDICINE
Payer: MEDICARE

## 2023-12-08 ENCOUNTER — PATIENT MESSAGE (OUTPATIENT)
Dept: INTERNAL MEDICINE | Facility: CLINIC | Age: 73
End: 2023-12-08
Payer: MEDICARE

## 2023-12-08 DIAGNOSIS — R42 DIZZINESS: ICD-10-CM

## 2023-12-08 DIAGNOSIS — R42 DIZZINESS AND GIDDINESS: ICD-10-CM

## 2023-12-08 PROCEDURE — 70450 CT HEAD/BRAIN W/O DYE: CPT | Mod: TC,HCNC

## 2023-12-08 PROCEDURE — 70450 CT HEAD WITHOUT CONTRAST: ICD-10-PCS | Mod: 26,HCNC,, | Performed by: RADIOLOGY

## 2023-12-08 PROCEDURE — 70450 CT HEAD/BRAIN W/O DYE: CPT | Mod: 26,HCNC,, | Performed by: RADIOLOGY

## 2023-12-18 DIAGNOSIS — G44.229 CHRONIC TENSION-TYPE HEADACHE, NOT INTRACTABLE: ICD-10-CM

## 2023-12-18 DIAGNOSIS — R79.89 LOW VITAMIN D LEVEL: ICD-10-CM

## 2023-12-18 RX ORDER — BUTALBITAL, ACETAMINOPHEN AND CAFFEINE 50; 325; 40 MG/1; MG/1; MG/1
1 TABLET ORAL EVERY 6 HOURS PRN
Qty: 45 TABLET | Refills: 0 | Status: SHIPPED | OUTPATIENT
Start: 2023-12-18 | End: 2024-01-30

## 2023-12-18 RX ORDER — ASPIRIN 325 MG
50000 TABLET, DELAYED RELEASE (ENTERIC COATED) ORAL WEEKLY
Qty: 12 CAPSULE | Refills: 3 | Status: SHIPPED | OUTPATIENT
Start: 2023-12-18 | End: 2024-01-30

## 2023-12-19 ENCOUNTER — PATIENT MESSAGE (OUTPATIENT)
Dept: INTERNAL MEDICINE | Facility: CLINIC | Age: 73
End: 2023-12-19
Payer: MEDICARE

## 2023-12-21 ENCOUNTER — OFFICE VISIT (OUTPATIENT)
Dept: INTERNAL MEDICINE | Facility: CLINIC | Age: 73
End: 2023-12-21
Payer: MEDICARE

## 2023-12-21 VITALS
OXYGEN SATURATION: 96 % | RESPIRATION RATE: 20 BRPM | DIASTOLIC BLOOD PRESSURE: 70 MMHG | TEMPERATURE: 98 F | WEIGHT: 293 LBS | HEART RATE: 80 BPM | SYSTOLIC BLOOD PRESSURE: 116 MMHG | BODY MASS INDEX: 41.86 KG/M2

## 2023-12-21 DIAGNOSIS — M79.605 PAIN IN BOTH LOWER EXTREMITIES: ICD-10-CM

## 2023-12-21 DIAGNOSIS — G89.4 CHRONIC PAIN DISORDER: Primary | ICD-10-CM

## 2023-12-21 DIAGNOSIS — M79.604 PAIN IN BOTH LOWER EXTREMITIES: ICD-10-CM

## 2023-12-21 PROCEDURE — 1159F MED LIST DOCD IN RCRD: CPT | Mod: HCNC,CPTII,S$GLB, | Performed by: FAMILY MEDICINE

## 2023-12-21 PROCEDURE — 3078F PR MOST RECENT DIASTOLIC BLOOD PRESSURE < 80 MM HG: ICD-10-PCS | Mod: HCNC,CPTII,S$GLB, | Performed by: FAMILY MEDICINE

## 2023-12-21 PROCEDURE — 3074F SYST BP LT 130 MM HG: CPT | Mod: HCNC,CPTII,S$GLB, | Performed by: FAMILY MEDICINE

## 2023-12-21 PROCEDURE — 1101F PR PT FALLS ASSESS DOC 0-1 FALLS W/OUT INJ PAST YR: ICD-10-PCS | Mod: HCNC,CPTII,S$GLB, | Performed by: FAMILY MEDICINE

## 2023-12-21 PROCEDURE — 3008F BODY MASS INDEX DOCD: CPT | Mod: HCNC,CPTII,S$GLB, | Performed by: FAMILY MEDICINE

## 2023-12-21 PROCEDURE — 99214 PR OFFICE/OUTPT VISIT, EST, LEVL IV, 30-39 MIN: ICD-10-PCS | Mod: HCNC,S$GLB,, | Performed by: FAMILY MEDICINE

## 2023-12-21 PROCEDURE — 4010F ACE/ARB THERAPY RXD/TAKEN: CPT | Mod: HCNC,CPTII,S$GLB, | Performed by: FAMILY MEDICINE

## 2023-12-21 PROCEDURE — 1125F AMNT PAIN NOTED PAIN PRSNT: CPT | Mod: HCNC,CPTII,S$GLB, | Performed by: FAMILY MEDICINE

## 2023-12-21 PROCEDURE — 99999 PR PBB SHADOW E&M-EST. PATIENT-LVL V: CPT | Mod: PBBFAC,HCNC,, | Performed by: FAMILY MEDICINE

## 2023-12-21 PROCEDURE — 3008F PR BODY MASS INDEX (BMI) DOCUMENTED: ICD-10-PCS | Mod: HCNC,CPTII,S$GLB, | Performed by: FAMILY MEDICINE

## 2023-12-21 PROCEDURE — 3288F FALL RISK ASSESSMENT DOCD: CPT | Mod: HCNC,CPTII,S$GLB, | Performed by: FAMILY MEDICINE

## 2023-12-21 PROCEDURE — 1125F PR PAIN SEVERITY QUANTIFIED, PAIN PRESENT: ICD-10-PCS | Mod: HCNC,CPTII,S$GLB, | Performed by: FAMILY MEDICINE

## 2023-12-21 PROCEDURE — 99214 OFFICE O/P EST MOD 30 MIN: CPT | Mod: HCNC,S$GLB,, | Performed by: FAMILY MEDICINE

## 2023-12-21 PROCEDURE — 99999 PR PBB SHADOW E&M-EST. PATIENT-LVL V: ICD-10-PCS | Mod: PBBFAC,HCNC,, | Performed by: FAMILY MEDICINE

## 2023-12-21 PROCEDURE — 1157F PR ADVANCE CARE PLAN OR EQUIV PRESENT IN MEDICAL RECORD: ICD-10-PCS | Mod: HCNC,CPTII,S$GLB, | Performed by: FAMILY MEDICINE

## 2023-12-21 PROCEDURE — 3078F DIAST BP <80 MM HG: CPT | Mod: HCNC,CPTII,S$GLB, | Performed by: FAMILY MEDICINE

## 2023-12-21 PROCEDURE — 3074F PR MOST RECENT SYSTOLIC BLOOD PRESSURE < 130 MM HG: ICD-10-PCS | Mod: HCNC,CPTII,S$GLB, | Performed by: FAMILY MEDICINE

## 2023-12-21 PROCEDURE — 1159F PR MEDICATION LIST DOCUMENTED IN MEDICAL RECORD: ICD-10-PCS | Mod: HCNC,CPTII,S$GLB, | Performed by: FAMILY MEDICINE

## 2023-12-21 PROCEDURE — 4010F PR ACE/ARB THEARPY RXD/TAKEN: ICD-10-PCS | Mod: HCNC,CPTII,S$GLB, | Performed by: FAMILY MEDICINE

## 2023-12-21 PROCEDURE — 1157F ADVNC CARE PLAN IN RCRD: CPT | Mod: HCNC,CPTII,S$GLB, | Performed by: FAMILY MEDICINE

## 2023-12-21 PROCEDURE — 1101F PT FALLS ASSESS-DOCD LE1/YR: CPT | Mod: HCNC,CPTII,S$GLB, | Performed by: FAMILY MEDICINE

## 2023-12-21 PROCEDURE — 3288F PR FALLS RISK ASSESSMENT DOCUMENTED: ICD-10-PCS | Mod: HCNC,CPTII,S$GLB, | Performed by: FAMILY MEDICINE

## 2023-12-21 RX ORDER — GABAPENTIN 300 MG/1
600 CAPSULE ORAL 3 TIMES DAILY
Qty: 180 CAPSULE | Refills: 11 | Status: SHIPPED | OUTPATIENT
Start: 2023-12-21 | End: 2024-12-20

## 2023-12-21 NOTE — PROGRESS NOTES
Subjective:       Patient ID: Susu Luther is a 73 y.o. female.    Chief Complaint: Follow-up    Susu Luther is 73 y.o. 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 She was prescribed tramadol 100 mg ER daily at the last appointment and has been taking the medication TID and is request an early refill today. She reports frequent, random falls, but has noted some improvement with falls and pain since starting physical therapy and lymphedema. She request Perocet but last prescription was placed by previous PCP Dr. Arias back in June/July. She is currently taking tramadol 100 mg TID, intermittent Tylenol arthritis, gabapentin 400 mg TID, Lidocaine patch, and   voltaren gel 1% sometimes twice daily or only prior to bed. She has noted bruising to her right leg, which she attributes to Eliquis.             Review of Systems   Constitutional:  Negative for activity change and unexpected weight change.   HENT:  Negative for hearing loss, rhinorrhea and trouble swallowing.    Eyes:  Negative for discharge and visual disturbance.   Respiratory:  Positive for chest tightness. Negative for wheezing.    Cardiovascular:  Positive for palpitations.   Gastrointestinal:  Negative for blood in stool, constipation and diarrhea.   Endocrine: Positive for polydipsia and polyuria.   Genitourinary:  Negative for difficulty urinating, dysuria, hematuria and menstrual problem.   Psychiatric/Behavioral:  Negative for confusion and dysphoric mood.        Objective:      Physical Exam  Vitals reviewed.   Constitutional:       Appearance: Normal appearance.   HENT:      Head: Normocephalic and atraumatic.   Eyes:      General: No scleral icterus.  Cardiovascular:      Rate and Rhythm: Normal rate and regular rhythm.      Pulses: Normal pulses.      Heart sounds: No murmur heard.     No friction rub. No gallop.   Pulmonary:       Effort: Pulmonary effort is normal. No respiratory distress.   Skin:     General: Skin is warm.      Comments: Wraps in place bilaterally   Neurological:      General: No focal deficit present.      Mental Status: She is alert.      Comments: Ambulating with assistance of rolling walker   Psychiatric:         Mood and Affect: Mood normal.         Behavior: Behavior normal.         Assessment:       1. Chronic pain disorder    2. Pain in both lower extremities        Plan:   1. Chronic pain disorder  Chronic, uncontrolled, however given patient age and increased risk of fall, will increase gabapentin dose, patient has been taking tramadol different that prescribed, will not call in prescription for additional tramadol at this time. Will trial increased gabapentin dose, but there is concern for excessive somnolence and increased risk of fall as discussed with the patient. She has an upcoming appointment with Pain Medicine through which there can be an optimized pain regimen discussed with her along with set expectations for pain and pain relief going forward.   -     gabapentin (NEURONTIN) 300 MG capsule; Take 2 capsules (600 mg total) by mouth 3 (three) times daily.  Dispense: 180 capsule; Refill: 11    2. Pain in both lower extremities  -     gabapentin (NEURONTIN) 300 MG capsule; Take 2 capsules (600 mg total) by mouth 3 (three) times daily.  Dispense: 180 capsule; Refill: 11      Follow up in about 2 months (around 3/6/2024).    Rubi Littlejohn MD  Family Medicine

## 2023-12-26 ENCOUNTER — TELEPHONE (OUTPATIENT)
Dept: PAIN MEDICINE | Facility: CLINIC | Age: 73
End: 2023-12-26
Payer: MEDICARE

## 2023-12-26 ENCOUNTER — PATIENT MESSAGE (OUTPATIENT)
Dept: INTERNAL MEDICINE | Facility: CLINIC | Age: 73
End: 2023-12-26
Payer: MEDICARE

## 2023-12-26 NOTE — TELEPHONE ENCOUNTER
Called pt and rescheduled her appt for her and placed her on the cancellation list. Pt verbalized understanding.    Lillian IRWIN

## 2023-12-26 NOTE — TELEPHONE ENCOUNTER
----- Message from Andrey Bingham sent at 12/26/2023 12:23 PM CST -----  Contact: Susu  Pt is calling in regards to getting a call back due to pt having diarrhea, leg swollen and arm swollen. Pt stated having the COVID booster, flu and shingle vaccine. Pt stated her appt was 12/26 and wants to know if she needs to come to appt or get reschedule. Please call back at [phone]                            Thanks  KT

## 2024-01-02 DIAGNOSIS — Z17.0 MALIGNANT NEOPLASM OF UPPER-OUTER QUADRANT OF RIGHT BREAST IN FEMALE, ESTROGEN RECEPTOR POSITIVE: Primary | ICD-10-CM

## 2024-01-02 DIAGNOSIS — D64.9 ANEMIA, UNSPECIFIED TYPE: ICD-10-CM

## 2024-01-02 DIAGNOSIS — C50.411 MALIGNANT NEOPLASM OF UPPER-OUTER QUADRANT OF RIGHT BREAST IN FEMALE, ESTROGEN RECEPTOR POSITIVE: Primary | ICD-10-CM

## 2024-01-03 ENCOUNTER — PATIENT MESSAGE (OUTPATIENT)
Dept: INTERNAL MEDICINE | Facility: CLINIC | Age: 74
End: 2024-01-03
Payer: MEDICARE

## 2024-01-04 DIAGNOSIS — M79.604 PAIN IN BOTH LOWER EXTREMITIES: ICD-10-CM

## 2024-01-04 DIAGNOSIS — M79.605 PAIN IN BOTH LOWER EXTREMITIES: ICD-10-CM

## 2024-01-08 DIAGNOSIS — F54 PSYCHOLOGICAL FACTORS AFFECTING MEDICAL CONDITION: ICD-10-CM

## 2024-01-08 DIAGNOSIS — F32.1 MAJOR DEPRESSIVE DISORDER, SINGLE EPISODE, MODERATE WITH ANXIOUS DISTRESS: ICD-10-CM

## 2024-01-09 RX ORDER — TRAMADOL HYDROCHLORIDE 100 MG/1
100 TABLET, EXTENDED RELEASE ORAL DAILY
Qty: 30 TABLET | Refills: 0 | Status: SHIPPED | OUTPATIENT
Start: 2024-01-09 | End: 2024-02-02 | Stop reason: ALTCHOICE

## 2024-01-10 ENCOUNTER — OFFICE VISIT (OUTPATIENT)
Dept: CARDIOLOGY | Facility: CLINIC | Age: 74
End: 2024-01-10
Payer: MEDICARE

## 2024-01-10 VITALS
DIASTOLIC BLOOD PRESSURE: 82 MMHG | BODY MASS INDEX: 39.68 KG/M2 | OXYGEN SATURATION: 97 % | SYSTOLIC BLOOD PRESSURE: 120 MMHG | HEIGHT: 72 IN | HEART RATE: 64 BPM | WEIGHT: 293 LBS

## 2024-01-10 DIAGNOSIS — G47.33 OSA (OBSTRUCTIVE SLEEP APNEA): ICD-10-CM

## 2024-01-10 DIAGNOSIS — Z86.711 HISTORY OF PULMONARY EMBOLISM: ICD-10-CM

## 2024-01-10 DIAGNOSIS — I10 HYPERTENSION, UNSPECIFIED TYPE: Primary | ICD-10-CM

## 2024-01-10 DIAGNOSIS — I50.32 CHRONIC DIASTOLIC CONGESTIVE HEART FAILURE: ICD-10-CM

## 2024-01-10 DIAGNOSIS — E66.01 CLASS 3 SEVERE OBESITY WITH SERIOUS COMORBIDITY AND BODY MASS INDEX (BMI) OF 40.0 TO 44.9 IN ADULT, UNSPECIFIED OBESITY TYPE: ICD-10-CM

## 2024-01-10 DIAGNOSIS — M79.606 PAIN OF LOWER EXTREMITY, UNSPECIFIED LATERALITY: ICD-10-CM

## 2024-01-10 DIAGNOSIS — R55 SYNCOPE AND COLLAPSE: ICD-10-CM

## 2024-01-10 DIAGNOSIS — E78.5 HYPERLIPIDEMIA, UNSPECIFIED HYPERLIPIDEMIA TYPE: ICD-10-CM

## 2024-01-10 DIAGNOSIS — N18.31 CHRONIC KIDNEY DISEASE, STAGE 3A: ICD-10-CM

## 2024-01-10 DIAGNOSIS — F33.1 MAJOR DEPRESSIVE DISORDER, RECURRENT EPISODE, MODERATE WITH ANXIOUS DISTRESS: ICD-10-CM

## 2024-01-10 DIAGNOSIS — R06.02 SOB (SHORTNESS OF BREATH): ICD-10-CM

## 2024-01-10 DIAGNOSIS — Z86.73 HISTORY OF CVA (CEREBROVASCULAR ACCIDENT): ICD-10-CM

## 2024-01-10 DIAGNOSIS — M79.2 NEUROPATHIC PAIN: ICD-10-CM

## 2024-01-10 DIAGNOSIS — C50.411 MALIGNANT NEOPLASM OF UPPER-OUTER QUADRANT OF RIGHT BREAST IN FEMALE, ESTROGEN RECEPTOR POSITIVE: ICD-10-CM

## 2024-01-10 DIAGNOSIS — R07.9 CHEST PAIN, UNSPECIFIED TYPE: ICD-10-CM

## 2024-01-10 DIAGNOSIS — I71.40 ABDOMINAL AORTIC ANEURYSM (AAA) WITHOUT RUPTURE, UNSPECIFIED PART: ICD-10-CM

## 2024-01-10 DIAGNOSIS — R60.9 EDEMA, UNSPECIFIED TYPE: ICD-10-CM

## 2024-01-10 DIAGNOSIS — Z17.0 MALIGNANT NEOPLASM OF UPPER-OUTER QUADRANT OF RIGHT BREAST IN FEMALE, ESTROGEN RECEPTOR POSITIVE: ICD-10-CM

## 2024-01-10 PROBLEM — I26.99 ACUTE PULMONARY EMBOLISM: Status: RESOLVED | Noted: 2022-02-15 | Resolved: 2024-01-10

## 2024-01-10 PROCEDURE — 99214 OFFICE O/P EST MOD 30 MIN: CPT | Mod: S$GLB,,, | Performed by: STUDENT IN AN ORGANIZED HEALTH CARE EDUCATION/TRAINING PROGRAM

## 2024-01-10 PROCEDURE — 99214 OFFICE O/P EST MOD 30 MIN: CPT | Mod: PBBFAC | Performed by: STUDENT IN AN ORGANIZED HEALTH CARE EDUCATION/TRAINING PROGRAM

## 2024-01-10 PROCEDURE — 99999 PR PBB SHADOW E&M-EST. PATIENT-LVL IV: CPT | Mod: PBBFAC,,, | Performed by: STUDENT IN AN ORGANIZED HEALTH CARE EDUCATION/TRAINING PROGRAM

## 2024-01-10 NOTE — PROGRESS NOTES
Subjective:   Patient ID:  Susu Luther is a 74 y.o. female who presents for follow up of No chief complaint on file.      12/1/20  69 yo female, care establish. Prior cardiologist Dr ackerman   [facility] HTN, CVA (2009), Right breast CA, Lumpectomy 3/2019, h/o PE off OAC 2 yrs ago.  AAA, s/p transcutaneous patch by Dr. Bonds, obesity knee OA imbalanced walker dependent  C/o SOB after walking few steps and dizziness. Had vision issue 1 month ago due to uncontrolled HTN  No chest pain  Sleeps with 2 pillows  Decent appetites  Leg calf pain worse at night  No smoking/drinking  ekg today NSR LVH.    ECH normal EF, grade II DD, LAE and PAP 56 mmHG   Chest CTA negative for PE  BP high    A1c controlled    S/p Open subpectoral lymph node biopsy on the right on 12/02/2020 by Dr. Starks  Still right chest, under arm and shoudler supersensitive pain      Shortness of Breath  Associated symptoms include chest pain and leg swelling. Pertinent negatives include no abdominal pain, claudication, fever, headaches, neck pain, orthopnea, PND, rash, sputum production, syncope, vomiting or wheezing.   Chest Pain   Associated symptoms include shortness of breath. Pertinent negatives include no abdominal pain, back pain, claudication, cough, diaphoresis, dizziness, fever, headaches, irregular heartbeat, malaise/fatigue, nausea, near-syncope, numbness, orthopnea, palpitations, PND, sputum production, syncope, vomiting or weakness.   Her past medical history is significant for CHF.   Pertinent negatives for past medical history include no seizures.   Congestive Heart Failure  Associated symptoms include chest pain and shortness of breath. Pertinent negatives include no abdominal pain, claudication, near-syncope or palpitations.     2/28/22  Patient was admitted to [facility] for shortness of breath.  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  Started on heparin drip  in transition to oral anticoagulation, now on Eliquis.  Recurrent PE.  On Femara. Has another lump in breast on right side, recently seen on PET scan.   Due to TANNER, was told to stop hctz, telmisartan.  She has ran out of clonidine. Does not take the ASA, hydralazine, amlodipine.   Blood pressure normotensive.  Reports severe headaches.  Has been out of Fioricet.  Echocardiogram with normal EF  Reports shortness of breath, chest heaviness mostly right-sided.      3/14/22  Still having SOB due to PE.  No bleeding issues while on eliquis.  Chest pain is substernal to right sided.   Lasix doesn't seem to help.   A reports intermittent leg pain right > left.  DVT ultrasound in-hospital with no evidence of DVT.  Following Hematology-Oncology.  Patient does not want surgery if needed for possible breast cancer.  Denies syncope, fever, chills.         4/18/22  Comes in with daughter who lives in [place].  Concerned that she may oxygen issues and may need home oxygen. O2 98%  Reports LE swelling and SOB  Reports chest pain comes on with SOB, did not have chest pain prior to PE  Has not been on lasix, has been held  Reports balancing issues- can do PT once symptoms improve   Denies syncope, fever, chills.       5/16/22  Has been feeling weak last couple days  Feels chest tightness with walking, also CURIEL- feels worse than before. 97% O2 in clinic   Reports dizziness after taking medications   BP low today   Says recurrent cancer may be spreading   No bleeding on eliquis   Reports orthopnea, PND.  Not feeling well- Does not want to the hospital     Denies syncope.      6/13/22  Not feeling well today  Reports chest pain and SOB worsening over the last 2 weeks   Ddimer trending, downTroponin neg and BNP nl on 5/16/22  EKG today without significant abnormalities   Reports recent diagnosis of breast cancer is progressing  Reports right-sided arm weakness  Patient has been increasingly stressed    Denies syncope, lower extremity  swelling.      7/6/2022   went to emergency room 6/13/2022, was worked up for stroke  MRI brain negative  Repeat CTA chest without PE, right-sided mass 6 cm, stable  All blood pressure medications.  Due to hypotension  Started taking Lasix today  Has significant lower extremity pain bilateral, reports blue feet at times      8/9/22  Virtual visit  Ambulates with walker   Had a fall recently due to syncopal episode, went to urgent care, negative workup per patient  Syncopal event occurred while standing up  Last visit Lasix was increased, patient reports increased thirst and water intake  Has also been taking carvedilol, reports low blood pressure  Chest pain worsened after syncopal event, has bruising on her body      9/7/22  Reports leg swelling, left  Has been taking eliquis also   Restarted taking lasix 2 weeks ago  Recently started on lyrica   Still wearing cardiac monitor  Still having SOB and chest discomfort  U/S arterial w/o significant stenosis   BP elevated, high at home  Lost 8 lbs since 9/1      10/12/22  Virtual visit  Doing well, still getting chest pain   Still has shortness of breath but has improved   Was able to go to a football game without any issues   DVT ultrasound without thrombus   Has been treated for gout, improvement of toe pain  Has been having intermittent blood pressure elevated readings at home        2/20/23  Stress test normal   BP elevated now  Has been more tired  Drinking lots of water  Chest pain has improved with imdur  SOB stable   Has chronic pain and chronic fatigue         3/21/23  Patient was recently admitted to the hospital for right arm weakness, TANNER, chest pain, shortness of breath   MRI/CT scan of the brain were unremarkable   CTA did not show PE   Had elevated creatinine, renal ultrasound did not show arterial stenosis  BP noted to be significantly elevated   Blood pressure meds were changed, patient only taking Entresto and amlodipine   Coreg was stopped  Reports  right-sided breast lump/mass currently being investigated    Today, reports still having shortness of breath  Waiting to have ultrasound done for right breast lump  Blood pressure stable today      4/25/23  Virtual visit   Continues to have intermittent chest pain, shortness of breath   Has not fallen since last visit   Blood pressure has been stable   Metanephrine levels are elevated  No bleeding on Eliquis      9/18/23  Virtual visit   Was admitted to [facility] 7/23 for chest pain and recurrent falls and worsening swelling in her legs  Lasix was switched to torsemide  Continues to have leg pain and swelling  Has been seeing Maria Isabel in [facility]  Currently taking torsemide 20 mg b.i.d.  No improvement in her leg pain and leg swelling since discharge from the hospital   Recent DVT ultrasound of right leg with no clot  Has been wearing compression stockings but difficult recently due to leg swelling      9/25/23  Virtual visit   Took metolazone once  Taking torsemide 40 mg b.i.d.  Swelling improving per patient but not a lot pain   CKD mildly worsening on recent labs        9/29/23  Virtual visit  Worsening renal function with taking increased dose of torsemide and metolazone x 2 doses   Swelling has improved and patient now able to walk w/o pain  Was told to hold diuretics for 1 week and have f/u labs  No SOB  Has not gotten contacted by lymphedema clinic at [facility]      10/25/23  Swelling has improved in legs, still having pain with ambulation   Insurance did not cover lymphedema clinic at [facility] or [facility]  Has shortness of breath intermittent      1/10/24  Going to lymphedema clinic in [facility] improving, now able to walk on them  Fell x2 1 month ago   Stopped amlodipine  Fluctuations in BP, can be high at times  Wearing compression stockings  BP stable   Says gabapentin may be causing weight gain  Weight going up significantly   SOB stable   Has been eating out- has been salty         Ekg 7/12/23 NSR, LAFB,  LVH  EKG 2/20/23 NSR, PVCs, LAD, minimal LVH  EKG 6/13/22 NSR, LAD, LVH, 93 bpm, qtc 455 ms  EKG 5/16/22 SB, incomplete RBBB, LVH, septal infarct, qtc 422 ms         Echo 2/28/22  The left ventricle is normal in size with concentric hypertrophy and normal systolic function.  The estimated ejection fraction is 60%.  Normal left ventricular diastolic function.  Normal right ventricular size with normal right ventricular systolic function.  Mild to moderate tricuspid regurgitation.  Normal central venous pressure (3 mmHg).  The estimated PA systolic pressure is 36 mmHg.       Echo 2019  CONCLUSIONS     1 - Mild left atrial enlargement.     2 - Concentric hypertrophy.     3 - No wall motion abnormalities.     4 - Normal left ventricular systolic function (EF 60-65%).     5 - Impaired LV relaxation, elevated LAP (grade 2 diastolic dysfunction).     6 - Normal right ventricular systolic function .     7 - The estimated PA systolic pressure is 36 mmHg.     8 - Mild tricuspid regurgitation.        Past Medical History:   Diagnosis Date    Cataract     Bilateral    Chronic diastolic congestive heart failure 08/25/2020    Dizziness 03/12/2023    Encounter for blood transfusion     History of repair of aneurysm of abdominal aorta using endovascular stent graft     DR Bonds    Hx of psychiatric care     Hypertension     Major depressive disorder, single episode, moderate with anxious distress 01/14/2020    Malignant neoplasm of upper-outer quadrant of right breast in female, estrogen receptor positive 03/14/2019    radiation    Psychiatric problem     Sleep apnea     Sleep difficulties     Stroke 2009    no residual defect    Therapy        Past Surgical History:   Procedure Laterality Date    APPENDECTOMY      AXILLARY NODE DISSECTION Right 12/02/2020    Procedure: LYMPHADENECTOMY, AXILLARY;  Surgeon: Artemio Starks MD;  Location: [facility];  Service: General;  Laterality: Right;    BIOPSY OF AXILLARY NODE Right 03/02/2021     Procedure: BIOPSY, LYMPH NODE, AXILLARY;  Surgeon: Artemio Sutton MD;  Location: [address];  Service: General;  Laterality: Right;    BREAST BIOPSY      2019    BREAST LUMPECTOMY Right     2019     SECTION      x 1    OOPHORECTOMY      right     SENTINEL LYMPH NODE BIOPSY Right 2019    Procedure: BIOPSY, LYMPH NODE, SENTINEL;  Surgeon: Artemio Starks MD;  Location: [facility];  Service: General;  Laterality: Right;    splenic artery aneurysm repair      TONSILLECTOMY         Social History     Tobacco Use    Smoking status: Never     Passive exposure: Past    Smokeless tobacco: Never   Substance Use Topics    Alcohol use: No    Drug use: No       Family History   Problem Relation Age of Onset    Diabetes Maternal Grandmother     Diabetes Maternal Grandfather     Diabetes Mother     Hypertension Mother     Sickle cell anemia Daughter     Breast cancer Neg Hx     Colon cancer Neg Hx     Ovarian cancer Neg Hx          Review of Systems   Constitutional: Negative for decreased appetite, diaphoresis, fever, malaise/fatigue and night sweats.   HENT:  Negative for nosebleeds.    Eyes:  Negative for blurred vision and double vision.   Cardiovascular:  Positive for chest pain and leg swelling. Negative for claudication, irregular heartbeat, near-syncope, orthopnea, palpitations, paroxysmal nocturnal dyspnea and syncope.   Respiratory:  Positive for shortness of breath. Negative for cough, sleep disturbances due to breathing, snoring, sputum production and wheezing.    Endocrine: Negative for cold intolerance and polyuria.   Hematologic/Lymphatic: Does not bruise/bleed easily.   Skin:  Negative for rash.   Musculoskeletal:  Negative for back pain, falls, joint pain, joint swelling and neck pain.        Right chest breast and shoulder pain   Gastrointestinal:  Negative for abdominal pain, heartburn, nausea and vomiting.   Genitourinary:  Negative for dysuria, frequency and hematuria.   Neurological:   Negative for difficulty with concentration, dizziness, focal weakness, headaches, light-headedness, numbness, seizures and weakness.   Psychiatric/Behavioral:  Negative for depression, memory loss and substance abuse. The patient does not have insomnia.    Allergic/Immunologic: Negative for HIV exposure and hives.     There were no vitals filed for this visit.            Objective:   Physical Exam  Constitutional:       Comments: Mild distress.    HENT:      Head: Normocephalic.   Eyes:      Pupils: Pupils are equal, round, and reactive to light.   Neck:      Thyroid: No thyromegaly.      Vascular: Normal carotid pulses. No carotid bruit or JVD.   Cardiovascular:      Rate and Rhythm: Normal rate and regular rhythm. No extrasystoles are present.     Chest Wall: PMI is not displaced.      Pulses: Normal pulses.      Heart sounds: Normal heart sounds. No murmur heard.     No gallop. No S3 sounds.   Pulmonary:      Effort: No respiratory distress.      Breath sounds: Normal breath sounds. No stridor.   Abdominal:      General: Bowel sounds are normal.      Palpations: Abdomen is soft.      Tenderness: There is no abdominal tenderness. There is no rebound.   Musculoskeletal:         General: Swelling present. Normal range of motion.      Comments: Tender to palpitation   Skin:     Findings: No rash.   Neurological:      Mental Status: She is alert and oriented to person, place, and time.   Psychiatric:         Behavior: Behavior normal.         Lab Results   Component Value Date    CHOL 234 (H) 02/28/2023    CHOL 229 (H) 04/18/2022    CHOL 221 (H) 10/19/2020     Lab Results   Component Value Date    HDL 44 02/28/2023    HDL 45 04/18/2022    HDL 55 10/19/2020     Lab Results   Component Value Date    LDLCALC 151.8 02/28/2023    LDLCALC 150.4 04/18/2022    LDLCALC 137.4 10/19/2020     Lab Results   Component Value Date    TRIG 191 (H) 02/28/2023    TRIG 168 (H) 04/18/2022    TRIG 143 10/19/2020     Lab Results   Component  Value Date    CHOLHDL 18.8 (L) 02/28/2023    CHOLHDL 19.7 (L) 04/18/2022    CHOLHDL 24.9 10/19/2020       Chemistry        Component Value Date/Time     10/11/2023 1040    K 4.5 10/11/2023 1040     10/11/2023 1040    CO2 23 10/11/2023 1040    BUN 28 (H) 10/11/2023 1040    CREATININE 1.5 (H) 10/11/2023 1040    GLU 90 10/11/2023 1040        Component Value Date/Time    CALCIUM 8.7 10/11/2023 1040    ALKPHOS 96 10/11/2023 1040    AST 11 10/11/2023 1040    ALT 9 (L) 10/11/2023 1040    BILITOT 0.3 10/11/2023 1040    ESTGFRAFRICA 37 (A) 07/06/2022 1312    EGFRNONAA 32 (A) 07/06/2022 1312          Lab Results   Component Value Date    HGBA1C 5.9 (H) 05/18/2019     Lab Results   Component Value Date    TSH 1.691 03/10/2023     Lab Results   Component Value Date    INR 1.1 03/11/2023    INR 0.9 02/15/2022    INR 1.0 11/10/2020     Lab Results   Component Value Date    WBC 9.05 08/03/2023    HGB 11.8 (L) 08/03/2023    HCT 37.2 08/03/2023    MCV 81 (L) 08/03/2023     08/03/2023     BMP  Sodium   Date Value Ref Range Status   10/11/2023 140 136 - 145 mmol/L Final     Potassium   Date Value Ref Range Status   10/11/2023 4.5 3.5 - 5.1 mmol/L Final     Chloride   Date Value Ref Range Status   10/11/2023 110 95 - 110 mmol/L Final     CO2   Date Value Ref Range Status   10/11/2023 23 23 - 29 mmol/L Final     BUN   Date Value Ref Range Status   10/11/2023 28 (H) 8 - 23 mg/dL Final     Creatinine   Date Value Ref Range Status   10/11/2023 1.5 (H) 0.5 - 1.4 mg/dL Final     Calcium   Date Value Ref Range Status   10/11/2023 8.7 8.7 - 10.5 mg/dL Final     Anion Gap   Date Value Ref Range Status   10/11/2023 7 (L) 8 - 16 mmol/L Final     eGFR if    Date Value Ref Range Status   07/06/2022 37 (A) >60 mL/min/1.73 m^2 Final     eGFR if non    Date Value Ref Range Status   07/06/2022 32 (A) >60 mL/min/1.73 m^2 Final     Comment:     Calculation used to obtain the estimated glomerular  filtration  rate (eGFR) is the CKD-EPI equation.        BNP  @LABRCNTIP(BNP,BNPTRIAGEBLO)@  @LABRCNTIP(troponini)@  CrCl cannot be calculated (Patient's most recent lab result is older than the maximum 7 days allowed.).  No results found in the last 24 hours.  No results found in the last 24 hours.  No results found in the last 24 hours.    Assessment:      1. Hypertension, unspecified type    2. Chronic diastolic congestive heart failure    3. Class 3 severe obesity with serious comorbidity and body mass index (BMI) of 40.0 to 44.9 in adult, unspecified obesity type    4. NILS (obstructive sleep apnea)    5. Malignant neoplasm of upper-outer quadrant of right breast in female, estrogen receptor positive    6. History of pulmonary embolism    7. Syncope and collapse    8. Major depressive disorder, recurrent episode, moderate with anxious distress    9. Neuropathic pain    10. Chronic kidney disease, stage 3a    11. Pain of lower extremity, unspecified laterality    12. Abdominal aortic aneurysm (AAA) without rupture, unspecified part    13. Edema, unspecified type    14. History of CVA (cerebrovascular accident)    15. SOB (shortness of breath)    16. Chest pain, unspecified type            Plan:     Left leg swelling/lymphedema- chronic  Torsemide 40 mg daily   Stopped Metolazone   Venous ultrasound 10/22 and 9/23 without DVT  lymphedema clinic at [facility]- was not covered by insurance  Seeing lymphedema clinic    Diastolic heart failure  Echo 3/23 with normal EF, mild-mod TR    Hypertension  Stable but intermittent significant fluctuations  Continue Entresto, hydralazine   Clonidine prn for BP >170 or >110  Elevated metanephrines- will re-refer if fluctuations continue      Chest pain/shortness of breath-stable  CTA 3/23 did not show PE   Stress test 1/23 normal stress test     Syncope- resolved   14 day monitor 8/22- 7.27% PACs, essentially asymptomatic    History of right breast cancer  Follows with  Hematology-Oncology    Right leg pain-stable  DVT ultrasound 10/22 without evidence of DVT   Normal ABIs 3/22  Arterial ultrasound 8/22 without significant stenosis    Recurrent pulmonary embolus   Recurrent PE as of 2/22  Continue OAC    History of CVA  Carotid ultrasound 6/22 no significant stenosis, MRI brain 6/22 no abnormality  Currently not on aspirin as she is taking Eliquis  Continue statin    HLD   as of 2/23  Continue statin    AAA s/p transcutaneous patch  Stable    Morbid obesity, BMI > 40  Low-salt, low-fat diet  Exercise as tolerated      All labs and cardiac procedures reviewed.      Return to clinic 3 months     Arlene Hobbs MD  Cardiology Staff

## 2024-01-17 ENCOUNTER — PATIENT MESSAGE (OUTPATIENT)
Dept: INTERNAL MEDICINE | Facility: CLINIC | Age: 74
End: 2024-01-17
Payer: MEDICARE

## 2024-01-17 RX ORDER — ALPRAZOLAM 0.5 MG/1
0.5 TABLET ORAL 2 TIMES DAILY PRN
Qty: 60 TABLET | Refills: 0 | Status: SHIPPED | OUTPATIENT
Start: 2024-01-17 | End: 2024-02-22 | Stop reason: SDUPTHER

## 2024-01-18 ENCOUNTER — LAB VISIT (OUTPATIENT)
Dept: LAB | Facility: HOSPITAL | Age: 74
End: 2024-01-18
Attending: STUDENT IN AN ORGANIZED HEALTH CARE EDUCATION/TRAINING PROGRAM
Payer: MEDICARE

## 2024-01-18 DIAGNOSIS — I50.32 CHRONIC DIASTOLIC CONGESTIVE HEART FAILURE: ICD-10-CM

## 2024-01-18 DIAGNOSIS — E78.5 HYPERLIPIDEMIA, UNSPECIFIED HYPERLIPIDEMIA TYPE: ICD-10-CM

## 2024-01-18 LAB
ALBUMIN SERPL BCP-MCNC: 3.2 G/DL (ref 3.5–5.2)
ALP SERPL-CCNC: 117 U/L (ref 55–135)
ALT SERPL W/O P-5'-P-CCNC: 8 U/L (ref 10–44)
ANION GAP SERPL CALC-SCNC: 11 MMOL/L (ref 8–16)
AST SERPL-CCNC: 11 U/L (ref 10–40)
BILIRUB SERPL-MCNC: 0.4 MG/DL (ref 0.1–1)
BUN SERPL-MCNC: 16 MG/DL (ref 8–23)
CALCIUM SERPL-MCNC: 9.2 MG/DL (ref 8.7–10.5)
CHLORIDE SERPL-SCNC: 112 MMOL/L (ref 95–110)
CO2 SERPL-SCNC: 20 MMOL/L (ref 23–29)
CREAT SERPL-MCNC: 1.5 MG/DL (ref 0.5–1.4)
EST. GFR  (NO RACE VARIABLE): 37 ML/MIN/1.73 M^2
GLUCOSE SERPL-MCNC: 107 MG/DL (ref 70–110)
POTASSIUM SERPL-SCNC: 4.2 MMOL/L (ref 3.5–5.1)
PROT SERPL-MCNC: 7.2 G/DL (ref 6–8.4)
SODIUM SERPL-SCNC: 143 MMOL/L (ref 136–145)

## 2024-01-18 PROCEDURE — 80061 LIPID PANEL: CPT | Performed by: STUDENT IN AN ORGANIZED HEALTH CARE EDUCATION/TRAINING PROGRAM

## 2024-01-18 PROCEDURE — 36415 COLL VENOUS BLD VENIPUNCTURE: CPT | Mod: PN | Performed by: STUDENT IN AN ORGANIZED HEALTH CARE EDUCATION/TRAINING PROGRAM

## 2024-01-18 PROCEDURE — 80053 COMPREHEN METABOLIC PANEL: CPT | Performed by: STUDENT IN AN ORGANIZED HEALTH CARE EDUCATION/TRAINING PROGRAM

## 2024-01-19 LAB
CHOLEST SERPL-MCNC: 197 MG/DL (ref 120–199)
CHOLEST/HDLC SERPL: 3.9 {RATIO} (ref 2–5)
HDLC SERPL-MCNC: 50 MG/DL (ref 40–75)
HDLC SERPL: 25.4 % (ref 20–50)
LDLC SERPL CALC-MCNC: 120.4 MG/DL (ref 63–159)
NONHDLC SERPL-MCNC: 147 MG/DL
TRIGL SERPL-MCNC: 133 MG/DL (ref 30–150)

## 2024-01-23 ENCOUNTER — PATIENT MESSAGE (OUTPATIENT)
Dept: CARDIOLOGY | Facility: CLINIC | Age: 74
End: 2024-01-23
Payer: MEDICARE

## 2024-01-25 ENCOUNTER — PATIENT MESSAGE (OUTPATIENT)
Dept: INTERNAL MEDICINE | Facility: CLINIC | Age: 74
End: 2024-01-25
Payer: MEDICARE

## 2024-01-25 RX ORDER — HYDRALAZINE HYDROCHLORIDE 25 MG/1
25 TABLET, FILM COATED ORAL 3 TIMES DAILY
Qty: 360 TABLET | Refills: 1 | Status: SHIPPED | OUTPATIENT
Start: 2024-01-25 | End: 2024-02-06 | Stop reason: SDUPTHER

## 2024-01-27 DIAGNOSIS — R79.89 LOW VITAMIN D LEVEL: ICD-10-CM

## 2024-01-27 DIAGNOSIS — R23.2 HOT FLASHES: ICD-10-CM

## 2024-01-27 DIAGNOSIS — M79.604 PAIN IN BOTH LOWER EXTREMITIES: ICD-10-CM

## 2024-01-27 DIAGNOSIS — F33.1 MAJOR DEPRESSIVE DISORDER, RECURRENT EPISODE, MODERATE WITH ANXIOUS DISTRESS: ICD-10-CM

## 2024-01-27 DIAGNOSIS — I10 HYPERTENSION, UNSPECIFIED TYPE: ICD-10-CM

## 2024-01-27 DIAGNOSIS — E78.5 HYPERLIPIDEMIA, UNSPECIFIED HYPERLIPIDEMIA TYPE: ICD-10-CM

## 2024-01-27 DIAGNOSIS — F54 PSYCHOLOGICAL FACTORS AFFECTING MEDICAL CONDITION: ICD-10-CM

## 2024-01-27 DIAGNOSIS — F41.1 GENERALIZED ANXIETY DISORDER: ICD-10-CM

## 2024-01-27 DIAGNOSIS — Z17.0 MALIGNANT NEOPLASM OF UPPER-OUTER QUADRANT OF RIGHT BREAST IN FEMALE, ESTROGEN RECEPTOR POSITIVE: ICD-10-CM

## 2024-01-27 DIAGNOSIS — M79.605 PAIN IN BOTH LOWER EXTREMITIES: ICD-10-CM

## 2024-01-27 DIAGNOSIS — J30.2 SEASONAL ALLERGIES: ICD-10-CM

## 2024-01-27 DIAGNOSIS — C50.411 MALIGNANT NEOPLASM OF UPPER-OUTER QUADRANT OF RIGHT BREAST IN FEMALE, ESTROGEN RECEPTOR POSITIVE: ICD-10-CM

## 2024-01-30 RX ORDER — HYDRALAZINE HYDROCHLORIDE 25 MG/1
TABLET, FILM COATED ORAL
Qty: 270 TABLET | Refills: 0 | OUTPATIENT
Start: 2024-01-30

## 2024-01-30 RX ORDER — ALPRAZOLAM 0.5 MG/1
0.5 TABLET ORAL 3 TIMES DAILY PRN
Qty: 60 TABLET | Refills: 0 | OUTPATIENT
Start: 2024-01-30

## 2024-01-30 RX ORDER — ATORVASTATIN CALCIUM 20 MG/1
20 TABLET, FILM COATED ORAL NIGHTLY
Qty: 90 TABLET | Refills: 0 | Status: SHIPPED | OUTPATIENT
Start: 2024-01-30 | End: 2024-04-18 | Stop reason: SDUPTHER

## 2024-01-30 RX ORDER — RANOLAZINE 1000 MG/1
TABLET, EXTENDED RELEASE ORAL
Qty: 180 TABLET | Refills: 0 | OUTPATIENT
Start: 2024-01-30

## 2024-01-30 RX ORDER — LEVOCETIRIZINE DIHYDROCHLORIDE 5 MG/1
5 TABLET, FILM COATED ORAL NIGHTLY
Qty: 90 TABLET | Refills: 0 | Status: SHIPPED | OUTPATIENT
Start: 2024-01-30 | End: 2024-04-18 | Stop reason: SDUPTHER

## 2024-01-30 RX ORDER — CARVEDILOL 3.12 MG/1
3.12 TABLET ORAL
Qty: 180 TABLET | Refills: 0 | OUTPATIENT
Start: 2024-01-30

## 2024-01-30 RX ORDER — BUTALBITAL, ACETAMINOPHEN AND CAFFEINE 50; 325; 40 MG/1; MG/1; MG/1
1 CAPSULE ORAL EVERY 6 HOURS PRN
Qty: 540 CAPSULE | Refills: 0 | Status: SHIPPED | OUTPATIENT
Start: 2024-01-30 | End: 2024-04-08

## 2024-01-30 RX ORDER — ZOLPIDEM TARTRATE 5 MG/1
5 TABLET ORAL NIGHTLY PRN
Qty: 30 TABLET | Refills: 0 | Status: SHIPPED | OUTPATIENT
Start: 2024-01-30

## 2024-01-30 RX ORDER — TORSEMIDE 20 MG/1
40 TABLET ORAL DAILY
Qty: 360 TABLET | Refills: 0 | Status: SHIPPED | OUTPATIENT
Start: 2024-01-30

## 2024-01-30 RX ORDER — LETROZOLE 2.5 MG/1
2.5 TABLET, FILM COATED ORAL DAILY
Qty: 90 TABLET | Refills: 0 | Status: SHIPPED | OUTPATIENT
Start: 2024-01-30 | End: 2024-04-18 | Stop reason: SDUPTHER

## 2024-01-30 RX ORDER — TRAMADOL HYDROCHLORIDE 100 MG/1
TABLET, EXTENDED RELEASE ORAL
Qty: 30 TABLET | Refills: 0 | OUTPATIENT
Start: 2024-01-30

## 2024-01-30 RX ORDER — ASPIRIN 325 MG
50000 TABLET, DELAYED RELEASE (ENTERIC COATED) ORAL
Qty: 30 CAPSULE | Refills: 0 | Status: SHIPPED | OUTPATIENT
Start: 2024-01-30

## 2024-01-30 RX ORDER — BUPROPION HYDROCHLORIDE 150 MG/1
TABLET ORAL
Qty: 90 TABLET | Refills: 0 | OUTPATIENT
Start: 2024-01-30

## 2024-01-30 RX ORDER — VENLAFAXINE HYDROCHLORIDE 37.5 MG/1
CAPSULE, EXTENDED RELEASE ORAL
Qty: 90 CAPSULE | Refills: 0 | OUTPATIENT
Start: 2024-01-30

## 2024-01-30 RX ORDER — GABAPENTIN 400 MG/1
CAPSULE ORAL
Qty: 270 CAPSULE | Refills: 0 | OUTPATIENT
Start: 2024-01-30

## 2024-01-30 NOTE — TELEPHONE ENCOUNTER
Patient is on multiple controlled substances will continue to attempt to wean medications safely.    Dr. Littlejohn

## 2024-02-01 ENCOUNTER — TELEPHONE (OUTPATIENT)
Dept: CARDIOLOGY | Facility: CLINIC | Age: 74
End: 2024-02-01
Payer: MEDICARE

## 2024-02-01 NOTE — TELEPHONE ENCOUNTER
Pt requesting refill for amlodipine but it isnt on her med list. Please advise if a refill is appropriate.               ----- Message from Colleen Newton sent at 2/1/2024  4:36 PM CST -----  [facility] is requesting a refill for amlodipine 5 mg.     [facility] - [place], [place] - [address]  [address] [address]  Phone: [phone] Fax: [phone]

## 2024-02-02 ENCOUNTER — OFFICE VISIT (OUTPATIENT)
Dept: PAIN MEDICINE | Facility: CLINIC | Age: 74
End: 2024-02-02
Payer: MEDICARE

## 2024-02-02 ENCOUNTER — HOSPITAL ENCOUNTER (OUTPATIENT)
Dept: RADIOLOGY | Facility: HOSPITAL | Age: 74
Discharge: HOME | End: 2024-02-02
Attending: PHYSICIAN ASSISTANT
Payer: MEDICARE

## 2024-02-02 VITALS
WEIGHT: 293 LBS | BODY MASS INDEX: 39.68 KG/M2 | DIASTOLIC BLOOD PRESSURE: 71 MMHG | HEIGHT: 72 IN | SYSTOLIC BLOOD PRESSURE: 174 MMHG | HEART RATE: 71 BPM

## 2024-02-02 DIAGNOSIS — M25.551 BILATERAL HIP PAIN: ICD-10-CM

## 2024-02-02 DIAGNOSIS — M54.50 DORSALGIA OF LUMBAR REGION: ICD-10-CM

## 2024-02-02 DIAGNOSIS — I89.0 LYMPHEDEMA: ICD-10-CM

## 2024-02-02 DIAGNOSIS — M54.16 LUMBAR RADICULOPATHY: Primary | ICD-10-CM

## 2024-02-02 DIAGNOSIS — M25.552 BILATERAL HIP PAIN: ICD-10-CM

## 2024-02-02 DIAGNOSIS — M54.16 LUMBAR RADICULOPATHY: ICD-10-CM

## 2024-02-02 DIAGNOSIS — M54.50 DORSALGIA OF LUMBAR REGION: Primary | ICD-10-CM

## 2024-02-02 PROCEDURE — 73521 X-RAY EXAM HIPS BI 2 VIEWS: CPT | Mod: 26,HCNC,, | Performed by: RADIOLOGY

## 2024-02-02 PROCEDURE — 73521 X-RAY EXAM HIPS BI 2 VIEWS: CPT | Mod: TC,HCNC

## 2024-02-02 PROCEDURE — 99999 PR PBB SHADOW E&M-EST. PATIENT-LVL V: CPT | Mod: PBBFAC,,, | Performed by: PHYSICIAN ASSISTANT

## 2024-02-02 PROCEDURE — 99215 OFFICE O/P EST HI 40 MIN: CPT | Mod: PBBFAC,25 | Performed by: PHYSICIAN ASSISTANT

## 2024-02-02 PROCEDURE — 99214 OFFICE O/P EST MOD 30 MIN: CPT | Mod: S$GLB,,, | Performed by: PHYSICIAN ASSISTANT

## 2024-02-02 RX ORDER — HYDROCODONE BITARTRATE AND ACETAMINOPHEN 5; 325 MG/1; MG/1
TABLET ORAL
Qty: 14 TABLET | Refills: 0 | Status: SHIPPED | OUTPATIENT
Start: 2024-02-02 | End: 2024-02-06

## 2024-02-02 NOTE — TELEPHONE ENCOUNTER
Returned pt call. Pt states that she is at the pharmacy waiting. Informed pt that her prescriptions has to be signed by Dr. Edwards first. Pt request that medication be sent to [facility] on [address] instead because it is closer to home. Provider notified. Pt verified understanding.

## 2024-02-02 NOTE — PROGRESS NOTES
Established chronic pain patient note (follow-up visit):    Chief Pain Complaint:  10 week follow up  Lower back pain  Falls      Interval History (2/2/2024):  Patient  presents today for follow-up visit.  Patient was last seen on 10/18/2023. Patient reports pain as 9/10 today.  She has a h/o lymphedema, currently in therapy for this. Attending three times a week. Utilizes compression garments on both legs.   Patient reports her BP has been elevated for the past 2 weeks. Has reached out to PCP regarding this.   Patient has pain in her lower back. Associates this to falling in her bathroom in July of last year. States she does not even remember going to the bathroom.  nurse came and called for assistance. She was admitted after this fall for 5 days.     Currently she has pain in her lower back and legs. She has diffuse leg pain  She also reports recent falls- 1 in January and 2 in December. Ambulates with a Rolator.      History of Present Illness 10/18/23:   Susu Luther is a 74 y.o. female  who is presenting with a chief complaint of right axillary and upper extremity Pain. The patient began experiencing this problem insidiously, and the pain has been gradually worsening over the past 3 month(s). The pain is described as throbbing, cramping, burning, aching and heavy and is located in the right axilla . Pain is intermittent and lasts hours. The  pain is nonradiating. The patient rates her pain a 7 out of ten and interferes with activities of daily living a 7 out of ten. Pain is exacerbated by rasing the right arm, lying on the right side, and is improved by rest. Patient reports no prior trauma, no prior spinal surgery  note, patient has a history of breast cancer    - pertinent negatives: No fever, No chills, No weight loss, No bladder dysfunction, No bowel dysfunction, No saddle anesthesia  - pertinent positives: none    - medications, other therapies tried (physical therapy, injections):     >> NSAIDs,  "Tylenol, Tramadol, Norco, Percocet and gabapentin    >> Has NOT previously undergone Physical Therapy    >> Has NOT previously undergone spinal injection/s      Imaging / Labs / Studies (reviewed on 2/2/2024):    Results for orders placed during the hospital encounter of 05/18/20    X-Ray Cervical Spine Complete 5 view    Narrative  EXAMINATION:  XR CERVICAL SPINE COMPLETE 5 VIEW    CLINICAL HISTORY:  Exposure to other specified factors, sequela.  Accident, sequela.    FINDINGS:  There is no cervical spine fracture or malalignment.  C5-6 spondylosis with anterior spurring.  Otherwise, unremarkable cervical spine x-ray.    Impression  See above.      Electronically signed by: Naun Jennings MD  Date:    05/18/2020  Time:    11:58      Review of Systems:  CONSTITUTIONAL: patient denies any fever, chills, or weight loss  SKIN: patient denies any rash or itching  RESPIRATORY: patient denies having any shortness of breath  GASTROINTESTINAL: patient denies having any diarrhea, constipation, or bowel incontinence  GENITOURINARY: patient denies having any abnormal bladder function    MUSCULOSKELETAL:  - patient complains of the above noted pain/s (see chief pain complaint)    NEUROLOGICAL:   - pain as above  - strength in Upper extremities is intact, BILATERALLY  - sensation in Upper extremities is intact, BILATERALLY  - patient denies any loss of bowel or bladder control      PSYCHIATRIC: patient denies any change in mood    Other:  All other systems reviewed and are negative      Physical Exam:  BP (!) 174/71   Pulse 71   Ht 6' 1" (1.854 m)   Wt (!) 148.7 kg (327 lb 13.2 oz)   LMP  (LMP Unknown)   BMI 43.25 kg/m²  (reviewed on 2/2/2024)  General: Alert and oriented, in no apparent distress.  Gait: normal gait.  Skin: No rashes, No discoloration, No obvious lesions  HEENT: Normocephalic, atraumatic. Pupils equal and round.  Cardiovascular: Regular rate and rhythm , moderately significant peripheral edema present in the " bilateral lower extremities and right upper extremity  Respiratory: Without audible wheezing, without use of accessory muscles of respiration.    Musculoskeletal:  Musculoskeletal - Lumbar Spine:  - Spinal Sensation: Normal   - Pain on flexion of lumbar spine: Present  - Pain on extension of lumbar spine: Present   - TTP over the lumbar facet joints: Present   - TTP over the lumbar paraspinals: Present   - SLR test equivocal bilaterally    Strength:  UE R/L: D: 5/5; B: 5/5; T: 5/5; WF: 5/5; WE: 5/5; IO: 5/5;  LE R/L: HF: 5/5, HE: 5/5, KF: 5/5; KE: 5/5; FE: 5/5; FF: 5/5    Extremity Reflexes: Brisk and symmetric throughout.      Extremity Sensory: Sensation to pinprick and temperature symmetric. Proprioception intact.      Psych:  Mood and affect is appropriate      Assessment:    Susu Luther is a 74 y.o. year old female who is presenting with     Encounter Diagnoses   Name Primary?    Lymphedema     Lumbar radiculopathy Yes    Dorsalgia of lumbar region     Bilateral hip pain        Plan:    1. Interventional: None for now.    2. Pharmacologic: Continue Gabapentin 600/600/1200 mg PO TID.         Continue Tizanidine 4 mg Po BID PRN.           Patient requests short term supply of Norco 5/325. Will route 7 day supply to Dr. Edwards for approval.     PCP has been prescribing Tramadol for pain.   Patient was previously prescribed Percocet 7.5/325 mg Po TID (90 tabs) on 5/25/2021 by Dr Clarke and Dr. Arias.      report:  Reviewed and consistent with medication use as prescribed.        3. Rehabilitative: Continue physical therapy in [place] ([facility]).    4. Diagnostic:  Reviewed labs and imaging available. MRI lumbar spine + hip/pelvic x-rays ordered for further evaluation.      5. Follow up:  return to clinic after MRI + x-rays to determine future plan of care       Virginia Vincent PA-C  Interventional Pain Medicine  [facility] [place]

## 2024-02-06 ENCOUNTER — LAB VISIT (OUTPATIENT)
Dept: LAB | Facility: HOSPITAL | Age: 74
End: 2024-02-06
Attending: FAMILY MEDICINE
Payer: MEDICARE

## 2024-02-06 ENCOUNTER — OFFICE VISIT (OUTPATIENT)
Dept: INTERNAL MEDICINE | Facility: CLINIC | Age: 74
End: 2024-02-06
Payer: MEDICARE

## 2024-02-06 VITALS
HEART RATE: 80 BPM | SYSTOLIC BLOOD PRESSURE: 144 MMHG | HEIGHT: 72 IN | DIASTOLIC BLOOD PRESSURE: 90 MMHG | BODY MASS INDEX: 39.68 KG/M2 | RESPIRATION RATE: 18 BRPM | TEMPERATURE: 98 F | WEIGHT: 293 LBS | OXYGEN SATURATION: 96 %

## 2024-02-06 DIAGNOSIS — D64.9 ANEMIA, UNSPECIFIED TYPE: ICD-10-CM

## 2024-02-06 DIAGNOSIS — I10 PRIMARY HYPERTENSION: Primary | ICD-10-CM

## 2024-02-06 DIAGNOSIS — Z17.0 MALIGNANT NEOPLASM OF UPPER-OUTER QUADRANT OF RIGHT BREAST IN FEMALE, ESTROGEN RECEPTOR POSITIVE: ICD-10-CM

## 2024-02-06 DIAGNOSIS — C50.411 MALIGNANT NEOPLASM OF UPPER-OUTER QUADRANT OF RIGHT BREAST IN FEMALE, ESTROGEN RECEPTOR POSITIVE: ICD-10-CM

## 2024-02-06 LAB
ALBUMIN SERPL BCP-MCNC: 3.2 G/DL (ref 3.5–5.2)
ALP SERPL-CCNC: 110 U/L (ref 55–135)
ALT SERPL W/O P-5'-P-CCNC: 6 U/L (ref 10–44)
ANION GAP SERPL CALC-SCNC: 6 MMOL/L (ref 8–16)
AST SERPL-CCNC: 9 U/L (ref 10–40)
BASOPHILS # BLD AUTO: 0.08 K/UL (ref 0–0.2)
BASOPHILS NFR BLD: 0.9 % (ref 0–1.9)
BILIRUB SERPL-MCNC: 0.3 MG/DL (ref 0.1–1)
BUN SERPL-MCNC: 20 MG/DL (ref 8–23)
CALCIUM SERPL-MCNC: 9.5 MG/DL (ref 8.7–10.5)
CHLORIDE SERPL-SCNC: 111 MMOL/L (ref 95–110)
CO2 SERPL-SCNC: 22 MMOL/L (ref 23–29)
CREAT SERPL-MCNC: 1.5 MG/DL (ref 0.5–1.4)
DIFFERENTIAL METHOD BLD: ABNORMAL
EOSINOPHIL # BLD AUTO: 0.1 K/UL (ref 0–0.5)
EOSINOPHIL NFR BLD: 1.4 % (ref 0–8)
ERYTHROCYTE [DISTWIDTH] IN BLOOD BY AUTOMATED COUNT: 15.4 % (ref 11.5–14.5)
EST. GFR  (NO RACE VARIABLE): 37 ML/MIN/1.73 M^2
GLUCOSE SERPL-MCNC: 112 MG/DL (ref 70–110)
HCT VFR BLD AUTO: 37.2 % (ref 37–48.5)
HGB BLD-MCNC: 11.6 G/DL (ref 12–16)
IMM GRANULOCYTES # BLD AUTO: 0.03 K/UL (ref 0–0.04)
IMM GRANULOCYTES NFR BLD AUTO: 0.3 % (ref 0–0.5)
LYMPHOCYTES # BLD AUTO: 2.5 K/UL (ref 1–4.8)
LYMPHOCYTES NFR BLD: 28 % (ref 18–48)
MCH RBC QN AUTO: 25.5 PG (ref 27–31)
MCHC RBC AUTO-ENTMCNC: 31.2 G/DL (ref 32–36)
MCV RBC AUTO: 82 FL (ref 82–98)
MONOCYTES # BLD AUTO: 0.5 K/UL (ref 0.3–1)
MONOCYTES NFR BLD: 6.2 % (ref 4–15)
NEUTROPHILS # BLD AUTO: 5.5 K/UL (ref 1.8–7.7)
NEUTROPHILS NFR BLD: 63.2 % (ref 38–73)
NRBC BLD-RTO: 0 /100 WBC
PLATELET # BLD AUTO: 299 K/UL (ref 150–450)
PMV BLD AUTO: 10.3 FL (ref 9.2–12.9)
POTASSIUM SERPL-SCNC: 5.1 MMOL/L (ref 3.5–5.1)
PROT SERPL-MCNC: 6.7 G/DL (ref 6–8.4)
RBC # BLD AUTO: 4.55 M/UL (ref 4–5.4)
SODIUM SERPL-SCNC: 139 MMOL/L (ref 136–145)
WBC # BLD AUTO: 8.76 K/UL (ref 3.9–12.7)

## 2024-02-06 PROCEDURE — 1125F AMNT PAIN NOTED PAIN PRSNT: CPT | Mod: HCNC,CPTII,S$GLB, | Performed by: FAMILY MEDICINE

## 2024-02-06 PROCEDURE — 1159F MED LIST DOCD IN RCRD: CPT | Mod: HCNC,CPTII,S$GLB, | Performed by: FAMILY MEDICINE

## 2024-02-06 PROCEDURE — 3008F BODY MASS INDEX DOCD: CPT | Mod: HCNC,CPTII,S$GLB, | Performed by: FAMILY MEDICINE

## 2024-02-06 PROCEDURE — 1157F ADVNC CARE PLAN IN RCRD: CPT | Mod: HCNC,CPTII,S$GLB, | Performed by: FAMILY MEDICINE

## 2024-02-06 PROCEDURE — 83540 ASSAY OF IRON: CPT | Mod: HCNC | Performed by: NURSE PRACTITIONER

## 2024-02-06 PROCEDURE — 1101F PT FALLS ASSESS-DOCD LE1/YR: CPT | Mod: HCNC,CPTII,S$GLB, | Performed by: FAMILY MEDICINE

## 2024-02-06 PROCEDURE — 99999 PR PBB SHADOW E&M-EST. PATIENT-LVL V: CPT | Mod: PBBFAC,HCNC,, | Performed by: FAMILY MEDICINE

## 2024-02-06 PROCEDURE — 3074F SYST BP LT 130 MM HG: CPT | Mod: HCNC,CPTII,S$GLB, | Performed by: FAMILY MEDICINE

## 2024-02-06 PROCEDURE — 80053 COMPREHEN METABOLIC PANEL: CPT | Mod: HCNC | Performed by: NURSE PRACTITIONER

## 2024-02-06 PROCEDURE — 99213 OFFICE O/P EST LOW 20 MIN: CPT | Mod: HCNC,S$GLB,, | Performed by: FAMILY MEDICINE

## 2024-02-06 PROCEDURE — 3288F FALL RISK ASSESSMENT DOCD: CPT | Mod: HCNC,CPTII,S$GLB, | Performed by: FAMILY MEDICINE

## 2024-02-06 PROCEDURE — 3080F DIAST BP >= 90 MM HG: CPT | Mod: HCNC,CPTII,S$GLB, | Performed by: FAMILY MEDICINE

## 2024-02-06 PROCEDURE — 36415 COLL VENOUS BLD VENIPUNCTURE: CPT | Mod: HCNC,PN | Performed by: NURSE PRACTITIONER

## 2024-02-06 PROCEDURE — 82728 ASSAY OF FERRITIN: CPT | Mod: HCNC | Performed by: NURSE PRACTITIONER

## 2024-02-06 PROCEDURE — 85025 COMPLETE CBC W/AUTO DIFF WBC: CPT | Mod: HCNC | Performed by: NURSE PRACTITIONER

## 2024-02-06 RX ORDER — HYDRALAZINE HYDROCHLORIDE 50 MG/1
50 TABLET, FILM COATED ORAL 3 TIMES DAILY
Qty: 90 TABLET | Refills: 3 | Status: SHIPPED | OUTPATIENT
Start: 2024-02-06 | End: 2024-02-06

## 2024-02-06 RX ORDER — BUTALBITAL, ACETAMINOPHEN AND CAFFEINE 50; 325; 40 MG/1; MG/1; MG/1
2 TABLET ORAL 2 TIMES DAILY
COMMUNITY
Start: 2024-02-01 | End: 2024-05-17 | Stop reason: SDUPTHER

## 2024-02-06 RX ORDER — CLONIDINE HYDROCHLORIDE 0.1 MG/1
0.2 TABLET ORAL 2 TIMES DAILY PRN
Qty: 60 TABLET | Refills: 11 | Status: SHIPPED | OUTPATIENT
Start: 2024-02-06 | End: 2024-05-24 | Stop reason: SDUPTHER

## 2024-02-06 RX ORDER — CLONIDINE HYDROCHLORIDE 0.1 MG/1
0.2 TABLET ORAL 2 TIMES DAILY PRN
Qty: 60 TABLET | Refills: 11 | Status: SHIPPED | OUTPATIENT
Start: 2024-02-06 | End: 2024-02-06

## 2024-02-06 RX ORDER — CLONIDINE HYDROCHLORIDE 0.1 MG/1
0.2 TABLET ORAL 2 TIMES DAILY
COMMUNITY
Start: 2024-01-27 | End: 2024-02-06 | Stop reason: SDUPTHER

## 2024-02-06 RX ORDER — HYDRALAZINE HYDROCHLORIDE 50 MG/1
50 TABLET, FILM COATED ORAL 3 TIMES DAILY
Qty: 90 TABLET | Refills: 3 | Status: SHIPPED | OUTPATIENT
Start: 2024-02-06 | End: 2024-04-03

## 2024-02-06 NOTE — PROGRESS NOTES
Subjective:       Patient ID: Susu Luther is a 74 y.o. female.    Chief Complaint: Hypertension (174/101 today at PT x 2 wks running high) and Headache    Hypertension  This is a chronic problem. The current episode started more than 1 month ago. The problem has been gradually worsening since onset. The problem is uncontrolled. Associated symptoms include headaches and peripheral edema. Pertinent negatives include no shortness of breath. There are no associated agents to hypertension. Risk factors for coronary artery disease include obesity, dyslipidemia and sedentary lifestyle. There are no compliance problems.      Review of Systems   Respiratory:  Negative for shortness of breath.    Neurological:  Positive for headaches.       Objective:      Physical Exam  Vitals reviewed.   HENT:      Head: Normocephalic and atraumatic.      Nose: No congestion or rhinorrhea.   Eyes:      General: No scleral icterus.        Right eye: No discharge.         Left eye: No discharge.      Extraocular Movements: Extraocular movements intact.      Conjunctiva/sclera: Conjunctivae normal.      Pupils: Pupils are equal, round, and reactive to light.   Cardiovascular:      Rate and Rhythm: Normal rate and regular rhythm.      Heart sounds: No murmur heard.     No friction rub. No gallop.   Pulmonary:      Effort: Pulmonary effort is normal. No respiratory distress.      Breath sounds: Normal breath sounds. No stridor. No wheezing or rhonchi.   Abdominal:      General: Bowel sounds are normal.      Palpations: Abdomen is soft.   Skin:     General: Skin is warm.   Neurological:      General: No focal deficit present.      Mental Status: She is alert.   Psychiatric:         Mood and Affect: Mood normal.         Behavior: Behavior normal.         Assessment:       1. Primary hypertension        Plan:   1. Primary hypertension  -     Discontinue: cloNIDine (CATAPRES) 0.1 MG tablet; Take 2 tablets (0.2 mg total) by mouth 2 (two)  times daily as needed (blood pressure greater than 170/90).  Dispense: 60 tablet; Refill: 11  -     Discontinue: hydrALAZINE (APRESOLINE) 50 MG tablet; Take 1 tablet (50 mg total) by mouth 3 (three) times daily.  Dispense: 90 tablet; Refill: 3  -     cloNIDine (CATAPRES) 0.1 MG tablet; Take 2 tablets (0.2 mg total) by mouth 2 (two) times daily as needed (blood pressure greater than 170/90).  Dispense: 60 tablet; Refill: 11  -     hydrALAZINE (APRESOLINE) 50 MG tablet; Take 1 tablet (50 mg total) by mouth 3 (three) times daily.  Dispense: 90 tablet; Refill: 3       Future Appointments   Date Time Provider Department Center   2/16/2024  2:30 PM Jaylyn Kraft FNP [facility] HEM ONC [facility]   2/29/2024  2:30 PM [facility] MRI [facility] MRI [facility]   3/1/2024 12:00 PM Karissa Tao NP [facility] IN [facility]   3/4/2024 10:00 AM Rubi Littlejohn MD [facility] IM [place]   3/7/2024  9:20 AM Gwyn Griffith MD [facility] NEURO [place]   5/22/2024 11:20 AM Arlene Hobbs MD [facility] CARDIO [facility]   5/29/2024 10:00 AM Barry Edwards MD ONLC IN [facility]        Rubi Littlejohn MD  Family Medicine

## 2024-02-07 ENCOUNTER — PATIENT MESSAGE (OUTPATIENT)
Dept: INTERNAL MEDICINE | Facility: CLINIC | Age: 74
End: 2024-02-07
Payer: MEDICARE

## 2024-02-07 ENCOUNTER — TELEPHONE (OUTPATIENT)
Dept: INTERNAL MEDICINE | Facility: CLINIC | Age: 74
End: 2024-02-07
Payer: MEDICARE

## 2024-02-07 LAB
FERRITIN SERPL-MCNC: 59 NG/ML (ref 20–300)
IRON SERPL-MCNC: 40 UG/DL (ref 30–160)
SATURATED IRON: 13 % (ref 20–50)
TOTAL IRON BINDING CAPACITY: 317 UG/DL (ref 250–450)
TRANSFERRIN SERPL-MCNC: 214 MG/DL (ref 200–375)

## 2024-02-07 NOTE — TELEPHONE ENCOUNTER
I left the pt a vm stating I spoke to her pharmacist TEJAL and he confirmed that her hydrazaline was filled and the clonidine was too soon according to her insurance carrier and she will have to wait until 2/11/24. fyi//kah

## 2024-02-14 ENCOUNTER — TELEPHONE (OUTPATIENT)
Dept: INTERNAL MEDICINE | Facility: CLINIC | Age: 74
End: 2024-02-14
Payer: MEDICARE

## 2024-02-14 DIAGNOSIS — G89.4 CHRONIC PAIN DISORDER: Primary | ICD-10-CM

## 2024-02-14 DIAGNOSIS — R53.1 GENERALIZED WEAKNESS: ICD-10-CM

## 2024-02-14 DIAGNOSIS — R60.0 BILATERAL LOWER EXTREMITY EDEMA: ICD-10-CM

## 2024-02-14 NOTE — TELEPHONE ENCOUNTER
----- Message from Keara Saez sent at 2024  9:00 AM CST -----  Contact: Halle/ Nguyen PT  Halle is calling to speak to the nurse regarding the patient a new order, the old one  due to the patient been sick, please call if any questions at  or fax order to     Thanks  LJ

## 2024-02-14 NOTE — TELEPHONE ENCOUNTER
I tried contacting no answer. The pt needs a new PT order due to her being out sick and the previous one . Please advise. Thanks //kah

## 2024-02-16 ENCOUNTER — OFFICE VISIT (OUTPATIENT)
Dept: HEMATOLOGY/ONCOLOGY | Facility: CLINIC | Age: 74
End: 2024-02-16
Payer: MEDICARE

## 2024-02-16 VITALS
TEMPERATURE: 97 F | BODY MASS INDEX: 39.68 KG/M2 | WEIGHT: 293 LBS | SYSTOLIC BLOOD PRESSURE: 157 MMHG | HEIGHT: 72 IN | HEART RATE: 74 BPM | DIASTOLIC BLOOD PRESSURE: 93 MMHG

## 2024-02-16 DIAGNOSIS — C50.411 MALIGNANT NEOPLASM OF UPPER-OUTER QUADRANT OF RIGHT BREAST IN FEMALE, ESTROGEN RECEPTOR POSITIVE: Primary | ICD-10-CM

## 2024-02-16 DIAGNOSIS — I26.99 ACUTE PULMONARY EMBOLISM, UNSPECIFIED PULMONARY EMBOLISM TYPE, UNSPECIFIED WHETHER ACUTE COR PULMONALE PRESENT: ICD-10-CM

## 2024-02-16 DIAGNOSIS — E61.1 IRON DEFICIENCY: ICD-10-CM

## 2024-02-16 DIAGNOSIS — Z17.0 MALIGNANT NEOPLASM OF UPPER-OUTER QUADRANT OF RIGHT BREAST IN FEMALE, ESTROGEN RECEPTOR POSITIVE: Primary | ICD-10-CM

## 2024-02-16 PROCEDURE — 99214 OFFICE O/P EST MOD 30 MIN: CPT | Mod: PBBFAC | Performed by: NURSE PRACTITIONER

## 2024-02-16 PROCEDURE — 99999 PR PBB SHADOW E&M-EST. PATIENT-LVL IV: CPT | Mod: PBBFAC,,, | Performed by: NURSE PRACTITIONER

## 2024-02-16 PROCEDURE — 99215 OFFICE O/P EST HI 40 MIN: CPT | Mod: S$GLB,,, | Performed by: NURSE PRACTITIONER

## 2024-02-16 NOTE — ASSESSMENT & PLAN NOTE
Diagnosed with Stage IIA (pT2, pN0(sn), cM0, G2, ER+, RI-, HER2-) invasive lobular carcinoma of right breast, status post lumpectomy with sentinel lymph node biopsy in 2019.  Currently on Femara and tolerating well.       Unfortunately, imaging studies have shown persistently enlarged right subpectoral lymph node as well as FDG avid left posterior cervical and supraclavicular lymph nodes. Multiple biopsies in the past have been nondiagnostic for malignancy.  Case was presented at multi-disciplinary tumor conference with recommendation to continue Arimidex.  Patient was not satisfied with recommendation a requested for 2nd opinion.     She was evaluated by Dr. Sutton in [place] who recommended MRI of breast.     MRI was performed on 2/23/2021 and showed lymph node mass deep to the right pectoralis minor muscle measuring 6.6 x 4.7 x 2.9 cm. There were also adjacent satellite lymph nodes anteriorly to the dominant lymph node measured 1.1 x 0.8 cm and 0.6 x 0.4 cm.  Also described was a mass effect upon the right subclavian vein.  The mass does not in case or envelope the subclavian neurovascular structures.     Given the above findings, recommendation was made to excise the mass if not for diagnostic purposes for pain management.  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 No evidence of metastatic carcinoma or lymphoma.     She was continued on Femara with plan for short interval PET scan.     Patient had a PET scan on 4/14/2021 that showed interim right axillary lymph node excision with large residual hypermetabolic right subpectoral lymph node mass.  Also noted interval development of subcentimeter mildly FDG avid left posterior cervical and supraclavicular lymph nodes. No FDG avid mediastinal or hilar nodes.No FDG avid pulmonary nodules/masses.      Had diagnostic bilateral mammography on 9/24/2021, results showed a left breast  subcentimeter mass at the 9 o'clock position which is new when compared to mammography from 2019.  Will plan a short-interval mammogram in 6 months to re-assess left breast mass.There was no mammographic evidence of disease in the right breast.     Saw Dr. Sutton in [place] who recommended a repeat PET-CT scan. Results from PET-CT scan showed interval enlargement of subpectoral mass on the right with sustained highly FDG avidity.     She continued to complain of shoulder pain and breast discomfort.  CT neck chest with contrast done on 3/17/2022, when compared to PET scan from 10/ 2021, the right subpectoral lymph node has not changed and still measures about 6 cm maximally.     Given continuing discomfort and pain associated with these enlarged lymph nodes.  She was referred to surgical oncologist following extensive conversation, it appears the risk of repeat surgical resection outweighs the benefits.  Decision was made to optimize neuropathic pain management.     Lyrica was initiated for neuropathic pain control. Continue Femara and pain management. Surveillance MMG from 10/2022 was BIRADS 2; recommend repeat in 1 year. Normal bone density on DEXA in 7/2023.

## 2024-02-16 NOTE — PROGRESS NOTES
Subjective:       Patient ID: Susu Luther is a 74 y.o. female.    Chief Complaint:   1. Malignant neoplasm of upper-outer quadrant of right breast in female, estrogen receptor positive  Stage IIA (pT2, pN0(sn), cM0, G2, ER+, SC-, HER2-) invasive lobular carcinoma of right breast      2. Acute pulmonary embolism, unspecified pulmonary embolism type, unspecified whether acute cor pulmonale present          Current Treatment:  Femara 2.5mg po daily      Eliquis 5mg po BID    Treatment History:  S/p lumpectomy & sentinel LN biopsy, right, Dr. Starks, 2/28/2019        XRT from 5/20/2019 to 6/18/2019    HPI: This is a 74-year-old obese  female with diastolic CHF HTN, major depressive disorder, sleep apnea, CVA, AAA repair, and Stage IIA (pT2, pN0(sn), cM0, G2, ER+, SC-, HER2-) invasive lobular carcinoma of right breast, status post lumpectomy with sentinel lymph node biopsy in 2019.  She has since been on aromatase inhibitor and has been tolerating well.     Restaging PET-CT scan performed on 11/4/2020 revealed large hypermetabolic right subpectoral lymph node mass measuring 6.9 x 3.2 cm with SUV max of 13.0.     Right chest wall lymph node biopsy performed on 11/10/2020 revealed fragments of benign lymph node with no evidence of malignancy.  Excisional biopsy of right subpectoral lymph node was again performed on 12/2/2020 and returned as benign with no evidence of metastatic disease.     Case was discussed at the tumor conference and recommendation was to continue aromatase inhibitor with plan for repeat short interval imaging to reassess the right subpectoral lymph node mass.  This was discussed with patient, however, she was not satisfied with the recommendation and wanted a 2nd opinion with a different oncologist.     She was referred to breast surgeon in [facility]. She had MRI and has a planned surgical resection on 3/1/2021. Status post lymph node excision on 3/1/2021 that showed follicular  hyperplasia.    She is currently on Femara and Eliquis.    Interval History: Patient presents for follow up on Femara and Eliquis. She presents alone and reports adherence to each. She still has hot flashes. She reports development of lymphedema for which she has pumps and sleeves. She states her PCP is working with her to get her BP under better control. Hgb mildly low at 11.6; RBC, Hct WNL. Iron levels low at 40; saturation 13%. CMP with stable CKD. Will treat iron deficiency with Venofer; will recheck her iron levels in 8 weeks.     Reviewed labs with patient:   CBC:   Recent Labs   Lab 02/06/24  1403   WBC 8.76   RBC 4.55   Hemoglobin 11.6 L   Hematocrit 37.2   Platelets 299   MCV 82   MCH 25.5 L   MCHC 31.2 L     CMP:  Recent Labs   Lab 02/06/24  1403   Glucose 112 H   Calcium 9.5   Albumin 3.2 L   Total Protein 6.7   Sodium 139   Potassium 5.1   CO2 22 L   Chloride 111 H   BUN 20   Creatinine 1.5 H   Alkaline Phosphatase 110   ALT 6 L   AST 9 L   Total Bilirubin 0.3     Lab Results   Component Value Date    IRON 40 02/06/2024    TRANSFERRIN 214 02/06/2024    TIBC 317 02/06/2024    FESATURATED 13 (L) 02/06/2024      Lab Results   Component Value Date    FERRITIN 59 02/06/2024       Social History     Socioeconomic History    Marital status:      Spouse name: Campos Luther    Number of children: 2   Tobacco Use    Smoking status: Never     Passive exposure: Past    Smokeless tobacco: Never   Substance and Sexual Activity    Alcohol use: No    Drug use: No    Sexual activity: Yes     Partners: Male     Birth control/protection: Post-menopausal     Social Determinants of Health     Financial Resource Strain: Low Risk  (2/15/2024)    Overall Financial Resource Strain (CARDIA)     Difficulty of Paying Living Expenses: Not hard at all   Food Insecurity: No Food Insecurity (2/15/2024)    Hunger Vital Sign     Worried About Running Out of Food in the Last Year: Never true     Ran Out of Food in the Last Year:  Never true   Transportation Needs: No Transportation Needs (2/15/2024)    PRAPARE - Transportation     Lack of Transportation (Medical): No     Lack of Transportation (Non-Medical): No   Physical Activity: Insufficiently Active (2/15/2024)    Exercise Vital Sign     Days of Exercise per Week: 2 days     Minutes of Exercise per Session: 30 min   Stress: No Stress Concern Present (2/15/2024)    [employer] - Occupational Stress Questionnaire     Feeling of Stress : Only a little   Social Connections: Socially Integrated (2/15/2024)    Social Connection and Isolation Panel [NHANES]     Frequency of Communication with Friends and Family: More than three times a week     Frequency of Social Gatherings with Friends and Family: More than three times a week     Attends Synagogue Services: More than 4 times per year     Active Member of Clubs or Organizations: Yes     Attends Club or Organization Meetings: More than 4 times per year     Marital Status:    Housing Stability: High Risk (2/15/2024)    Housing Stability Vital Sign     Unable to Pay for Housing in the Last Year: No     Number of Places Lived in the Last Year: 1     Unstable Housing in the Last Year: Yes     Past Medical History:   Diagnosis Date    Cataract     Bilateral    Chronic diastolic congestive heart failure 08/25/2020    Dizziness 03/12/2023    Encounter for blood transfusion     History of repair of aneurysm of abdominal aorta using endovascular stent graft     DR Bonds    Hx of psychiatric care     Hypertension     Major depressive disorder, single episode, moderate with anxious distress 01/14/2020    Malignant neoplasm of upper-outer quadrant of right breast in female, estrogen receptor positive 03/14/2019    radiation    Psychiatric problem     Sleep apnea     Sleep difficulties     Stroke 2009    no residual defect    Therapy      Family History   Problem Relation Age of Onset    Diabetes Maternal Grandmother      Diabetes Maternal Grandfather     Diabetes Mother     Hypertension Mother     Sickle cell anemia Daughter     Breast cancer Neg Hx     Colon cancer Neg Hx     Ovarian cancer Neg Hx      Past Surgical History:   Procedure Laterality Date    APPENDECTOMY      AXILLARY NODE DISSECTION Right 2020    Procedure: LYMPHADENECTOMY, AXILLARY;  Surgeon: Artemio Starks MD;  Location: [facility];  Service: General;  Laterality: Right;    BIOPSY OF AXILLARY NODE Right 2021    Procedure: BIOPSY, LYMPH NODE, AXILLARY;  Surgeon: Artemio Sutton MD;  Location: [address];  Service: General;  Laterality: Right;    BREAST BIOPSY      2019    BREAST LUMPECTOMY Right     2019     SECTION      x 1    OOPHORECTOMY      right     SENTINEL LYMPH NODE BIOPSY Right 2019    Procedure: BIOPSY, LYMPH NODE, SENTINEL;  Surgeon: Artemio Starks MD;  Location: [facility];  Service: General;  Laterality: Right;    splenic artery aneurysm repair      TONSILLECTOMY       Review of Systems   Constitutional:  Negative for appetite change and fatigue.   HENT:  Negative for mouth sores, rhinorrhea and sore throat.    Eyes: Negative.    Respiratory: Negative.     Cardiovascular:  Positive for leg swelling (lymphedema).   Gastrointestinal:  Negative for constipation, diarrhea, nausea and vomiting.   Genitourinary: Negative.  Positive for hot flashes.   Musculoskeletal:  Positive for arthralgias (mild, improved).        Limited ROM due to muscle tension at surgical site   Integumentary:  Positive for breast mass, breast discharge (clear, from nipples) and breast tenderness. Negative.   Allergic/Immunologic: Negative.    Neurological:  Negative for weakness and numbness.   Hematological:  Does not bruise/bleed easily.   Psychiatric/Behavioral: Negative.     Breast: Positive for mass and tenderness.      Medication List with Changes/Refills   Current Medications    ALPRAZOLAM (XANAX) 0.5 MG TABLET    Take 1 tablet (0.5 mg total)  by mouth 2 (two) times daily as needed for Anxiety.    APIXABAN (ELIQUIS) 5 MG TAB    Take 1 tablet (5 mg total) by mouth 2 (two) times daily.    ATORVASTATIN (LIPITOR) 20 MG TABLET    Take 1 tablet (20 mg total) by mouth every evening.    BUTALBITAL-ACETAMINOPHEN-CAFF -40 MG -40 MG CAP    Take 1 tablet by mouth every 6 (six) hours as needed (Headache).    BUTALBITAL-ACETAMINOPHEN-CAFFEINE -40 MG (FIORICET, ESGIC) -40 MG PER TABLET    Take 2 tablets by mouth 2 (two) times daily.    CHOLECALCIFEROL, VITAMIN D3, 1,250 MCG (50,000 UNIT) CAPSULE    Take 1 capsule (50,000 Units total) by mouth every 7 days.    CLONIDINE (CATAPRES) 0.1 MG TABLET    Take 2 tablets (0.2 mg total) by mouth 2 (two) times daily as needed (blood pressure greater than 170/90).    DICLOFENAC SODIUM (VOLTAREN) 1 % GEL    Apply 2 grams topically once daily.    GABAPENTIN (NEURONTIN) 300 MG CAPSULE    Take 2 capsules (600 mg total) by mouth 3 (three) times daily.    HYDRALAZINE (APRESOLINE) 50 MG TABLET    Take 1 tablet (50 mg total) by mouth 3 (three) times daily.    LETROZOLE (FEMARA) 2.5 MG TAB    Take 1 tablet (2.5 mg total) by mouth once daily.    LEVOCETIRIZINE (XYZAL) 5 MG TABLET    Take 1 tablet (5 mg total) by mouth every evening.    LIDOCAINE (LIDODERM) 5 %    Place 2 patches onto the skin once daily. Remove & Discard patch within 12 hours or as directed by MD    MULTIVITAMIN (THERAGRAN) PER TABLET    Take 1 tablet by mouth once daily.    RANOLAZINE (RANEXA) 1,000 MG TB12    Take 1 tablet (1,000 mg total) by mouth 2 (two) times daily.    SACUBITRIL-VALSARTAN (ENTRESTO) 24-26 MG PER TABLET    Take 1 tablet by mouth 2 (two) times daily.    TAMSULOSIN (FLOMAX) 0.4 MG CAP    Take 1 capsule (0.4 mg total) by mouth once daily.    TORSEMIDE (DEMADEX) 20 MG TAB    Take 2 tablets (40 mg total) by mouth once daily.    VENLAFAXINE (EFFEXOR-XR) 37.5 MG 24 HR CAPSULE    Take 1 capsule (37.5 mg total) by mouth once daily.     ZOLPIDEM (AMBIEN) 5 MG TAB    Take 1 tablet (5 mg total) by mouth nightly as needed.     Objective:     Vitals:    02/16/24 1417   BP: (!) 157/93   Pulse: 74   Temp: 97.2 °F (36.2 °C)     Physical Exam  Vitals reviewed.   Constitutional:       Appearance: Normal appearance. She is obese.   HENT:      Head: Normocephalic.   Eyes:      Extraocular Movements: Extraocular movements intact.      Pupils: Pupils are equal, round, and reactive to light.      Comments: Glasses       Cardiovascular:      Rate and Rhythm: Normal rate and regular rhythm.      Heart sounds: Normal heart sounds.   Pulmonary:      Effort: Pulmonary effort is normal.      Breath sounds: Normal breath sounds.   Abdominal:      General: Bowel sounds are normal.      Palpations: Abdomen is soft.      Comments: rounded     Genitourinary:     Comments: deferred    Musculoskeletal:      Cervical back: Normal range of motion and neck supple.      Comments: Ambulates with rolling walker   Skin:     General: Skin is warm and dry.      Findings: No bruising (left hand, mild).   Neurological:      Mental Status: She is alert and oriented to person, place, and time.   Psychiatric:         Behavior: Behavior normal.         Thought Content: Thought content normal.          (2) Ambulatory and capable of self care, unable to carry out work activity, up and about > 50% or waking hours  Assessment:     Problem List Items Addressed This Visit          Hematology    Acute pulmonary embolism     Likely provoked due to hypercoagulable state from breast cancer and obesity. Continue apixaban at 5 mg b.i.d..  Advised that she will be on long-term anticoagulation given recurrent thrombosis history as well as active malignancy.            Oncology    Malignant neoplasm of upper-outer quadrant of right breast in female, estrogen receptor positive - Primary (Chronic)     Diagnosed with Stage IIA (pT2, pN0(sn), cM0, G2, ER+, CA-, HER2-) invasive lobular carcinoma of right  breast, status post lumpectomy with sentinel lymph node biopsy in 2019.  Currently on Femara and tolerating well.       Unfortunately, imaging studies have shown persistently enlarged right subpectoral lymph node as well as FDG avid left posterior cervical and supraclavicular lymph nodes. Multiple biopsies in the past have been nondiagnostic for malignancy.  Case was presented at multi-disciplinary tumor conference with recommendation to continue Arimidex.  Patient was not satisfied with recommendation a requested for 2nd opinion.     She was evaluated by Dr. Sutton in [place] who recommended MRI of breast.     MRI was performed on 2/23/2021 and showed lymph node mass deep to the right pectoralis minor muscle measuring 6.6 x 4.7 x 2.9 cm. There were also adjacent satellite lymph nodes anteriorly to the dominant lymph node measured 1.1 x 0.8 cm and 0.6 x 0.4 cm.  Also described was a mass effect upon the right subclavian vein.  The mass does not in case or envelope the subclavian neurovascular structures.     Given the above findings, recommendation was made to excise the mass if not for diagnostic purposes for pain management.  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 No evidence of metastatic carcinoma or lymphoma.     She was continued on Femara with plan for short interval PET scan.     Patient had a PET scan on 4/14/2021 that showed interim right axillary lymph node excision with large residual hypermetabolic right subpectoral lymph node mass.  Also noted interval development of subcentimeter mildly FDG avid left posterior cervical and supraclavicular lymph nodes. No FDG avid mediastinal or hilar nodes.No FDG avid pulmonary nodules/masses.      Had diagnostic bilateral mammography on 9/24/2021, results showed a left breast subcentimeter mass at the 9 o'clock position which is new when compared to mammography from 2019.  Will plan a  short-interval mammogram in 6 months to re-assess left breast mass.There was no mammographic evidence of disease in the right breast.     Saw Dr. Sutton in [place] who recommended a repeat PET-CT scan. Results from PET-CT scan showed interval enlargement of subpectoral mass on the right with sustained highly FDG avidity.     She continued to complain of shoulder pain and breast discomfort.  CT neck chest with contrast done on 3/17/2022, when compared to PET scan from 10/ 2021, the right subpectoral lymph node has not changed and still measures about 6 cm maximally.     Given continuing discomfort and pain associated with these enlarged lymph nodes.  She was referred to surgical oncologist following extensive conversation, it appears the risk of repeat surgical resection outweighs the benefits.  Decision was made to optimize neuropathic pain management.     Lyrica was initiated for neuropathic pain control. Continue Femara and pain management. Surveillance MMG from 10/2022 was BIRADS 2; recommend repeat in 1 year. Normal bone density on DEXA in 7/2023.           Plan:     Malignant neoplasm of upper-outer quadrant of right breast in female, estrogen receptor positive    Acute pulmonary embolism, unspecified pulmonary embolism type, unspecified whether acute cor pulmonale present    Labs reviewed; mild iron deficiency. Stable CKD.  Continue Femara and Eliquis as prescribed; no refills requested.   Continue vitamin D and calcium supplementation daily.   Venofer x 5 doses twice weekly.  Follow up in 8 weeks  with  iron profile, ferritin, CBC, and Comprehensive Metabolic Panel.  Repeat DEXA due 7/2025.   Surveillance MMG due 10/2023; will have rescheduled.  Patient to resume postmastectomy exercises to improve ROM and muscle tension at surgical site.    Per NCCN Guidelines BINV-17:  H&P 1-4 times per year as clinically appropriate for 5 years then annually  Periodic screening for changes in family history and  genetic testing indications and referral to genetic counseling as indicated  Educate, monitor, and refer for lymphedema management  MMG every 12 months  Routine imaging of reconstructed breast is not indicated  For patients receiving anthracycline-based therapy, see NCCN Guidelines for Survivorship for echocardiogram recommendations  In the absence of clinical signs and symptoms suggestive of recurrent disease, there is no indication for lab or imaging studies for metastases screening    Route Chart for Scheduling    Med Onc Chart Routing      Follow up with physician    Follow up with CELESTINA Other. 8 weeks   Infusion scheduling note    Injection scheduling note Venofer x 5 doses twice weekly   Labs CBC, CMP, ferritin and iron and TIBC   Scheduling:  Preferred lab:  Lab interval:  in 8 weeks, 2 days prior   Imaging Mammogram      Pharmacy appointment No pharmacy appointment needed      Other referrals       No additional referrals needed              I will review assessment/plan with collaborating physician.        JACKELINE Stewart

## 2024-02-21 PROCEDURE — 82274 ASSAY TEST FOR BLOOD FECAL: CPT | Mod: HCNC | Performed by: FAMILY MEDICINE

## 2024-02-22 ENCOUNTER — TELEPHONE (OUTPATIENT)
Dept: NEUROLOGY | Facility: CLINIC | Age: 74
End: 2024-02-22
Payer: MEDICARE

## 2024-02-22 ENCOUNTER — PATIENT MESSAGE (OUTPATIENT)
Dept: PAIN MEDICINE | Facility: CLINIC | Age: 74
End: 2024-02-22
Payer: MEDICARE

## 2024-02-22 DIAGNOSIS — M79.605 BILATERAL LEG PAIN: ICD-10-CM

## 2024-02-22 DIAGNOSIS — F54 PSYCHOLOGICAL FACTORS AFFECTING MEDICAL CONDITION: ICD-10-CM

## 2024-02-22 DIAGNOSIS — M79.604 BILATERAL LEG PAIN: ICD-10-CM

## 2024-02-22 RX ORDER — HYDROCODONE BITARTRATE AND ACETAMINOPHEN 5; 325 MG/1; MG/1
1 TABLET ORAL EVERY 6 HOURS PRN
COMMUNITY
End: 2024-03-08

## 2024-02-22 RX ORDER — HYDROCODONE BITARTRATE AND ACETAMINOPHEN 5; 325 MG/1; MG/1
1 TABLET ORAL EVERY 6 HOURS PRN
Qty: 14 TABLET | Refills: 0 | OUTPATIENT
Start: 2024-02-22

## 2024-02-22 NOTE — TELEPHONE ENCOUNTER
----- Message from Supa Gloria sent at 2/22/2024  3:12 PM CST -----  Contact: Susu  Type:  Patient Returning Call    Who Called:Susu  Who Left Message for Patient:nurse  Does the patient know what this is regarding?:missed call  Would the patient rather a call back or a response via [facility]? call  Best Call Back Number:[phone]   Additional Information:

## 2024-02-22 NOTE — TELEPHONE ENCOUNTER
Pt is requesting for a refill of: HYDROcodone-acetaminophen (NORCO) 5-325 mg per tablet   Last filed:2/02/24  Last encounter: 2/02/24  Up coming apt: 3/01/24  Pharmacy:  [facility] [place] [id number] [address]  [id number] [facility]     Is this something you can do?

## 2024-02-23 ENCOUNTER — TELEPHONE (OUTPATIENT)
Dept: INTERNAL MEDICINE | Facility: CLINIC | Age: 74
End: 2024-02-23
Payer: MEDICARE

## 2024-02-23 ENCOUNTER — TELEPHONE (OUTPATIENT)
Dept: CARDIOLOGY | Facility: CLINIC | Age: 74
End: 2024-02-23

## 2024-02-23 ENCOUNTER — PATIENT MESSAGE (OUTPATIENT)
Dept: ADMINISTRATIVE | Facility: HOSPITAL | Age: 74
End: 2024-02-23
Payer: MEDICARE

## 2024-02-23 ENCOUNTER — TELEPHONE (OUTPATIENT)
Dept: CARDIOLOGY | Facility: CLINIC | Age: 74
End: 2024-02-23
Payer: MEDICARE

## 2024-02-23 RX ORDER — ALPRAZOLAM 0.5 MG/1
0.5 TABLET ORAL 2 TIMES DAILY PRN
Qty: 60 TABLET | Refills: 0 | Status: SHIPPED | OUTPATIENT
Start: 2024-02-23 | End: 2024-03-18 | Stop reason: SDUPTHER

## 2024-02-23 RX ORDER — DICLOFENAC SODIUM 10 MG/G
2 GEL TOPICAL DAILY
Qty: 400 G | Refills: 1 | Status: SHIPPED | OUTPATIENT
Start: 2024-02-23 | End: 2024-04-18 | Stop reason: SDUPTHER

## 2024-02-23 NOTE — TELEPHONE ENCOUNTER
I returned a call back to the pt who needs an referral/ order sent to [facility] . [address] [phone] phone . I called to confirm what orders are needed but did not speak with anyone . I left a vm for Jimena to call me back regarding this. I will inform you once I get clarification what is needed. Thanks . Pt also states she fell in the parking lot when she came her to give labs but, her legs always go out. //kah   or gallop. Respiratory:  Clinically clear lung fields. Abdomen: Soft with normal bowel sounds. Ext:  LE edema    Data/  Recent Labs     01/11/20 2010 01/12/20 0446 01/13/20  0915   WBC 5.3 4.8 5.8   HGB 11.5* 10.0* 11.5*   HCT 36.5 30.7* 35.5*   MCV 98.1 95.1 96.6    162 186     Recent Labs     01/11/20 2010 01/12/20 0446 01/13/20  0915 01/14/20  0850   * 138 134* 136   K 4.5 3.7 4.1 4.0   CL 89* 99 93* 94*   CO2 25 27 27 30   GLUCOSE 708* 237* 373* 191*   MG 1.8  --   --   --    BUN 34* 32* 29* 31*   CREATININE 1.40* 1.09* 1.15* 1.24*   LABGLOM 36* 48* 45* 42*   GFRAA 44* 59* 55* 50*     Assessment/Plan:     1. Acute kidney injury superimposed on chronic kidney disease stage III - consistent with prerenal azotemia As well as contrast mediated acute tubular necrosis due to intravenous contrast exposure on 1/11/2020. She is at increased risk for contrast nephropathy based on the presence of proteinuria, multiple myeloma and acute as well as chronic kidney disease. Risk profile was again reviewed with the patient and she understands. Plan: Start IV fluids 0.9 normal saline at 50 mL/h. Mucomyst 600 mg p.o. twice daily x4 doses. Hold torsemide. Basic metabolic profile daily. 2.  Chest pain - rule out acute coronary syndrome. Patient has history of CAD status post CABG. Patient is going for cardiac catheterization today.     3. Hyponatremia - corrected. 4.  Normocytic anemia - multifactorial etiology including multiple myeloma and chronic kidney disease. Hemoglobin is stable at 11.5 g/dL.     Nicol Woods  Attending Clinical Nephrologist

## 2024-02-23 NOTE — TELEPHONE ENCOUNTER
Spoke with patient she states her BP is elevated today 175/110 and her lymphedema is worsened and need orders from [facility]. Spoke with [facility] they need last office note. Will send over.       ----- Message from Doris Morales sent at 2/23/2024  2:35 PM CST -----  Contact: Susu  .Type:  Patient Returning Call    Who Called:Susu   Who Left Message for Patient:nurse   Does the patient know what this is regarding?:call   Would the patient rather a call back or a response via [facility]? Call   Best Call Back Number:.[phone]   Additional Information: Pt requesting a call back

## 2024-02-23 NOTE — TELEPHONE ENCOUNTER
LVM to return call.     ----- Message from Angel Burks sent at 2/23/2024 11:57 AM CST -----  Contact: patient  Susu Luther would like a call back at [phone], in regards to [facility] needing to be contacted about the rods and her stocking for her lymphedema . Ernesto beckford can be contacted @ [phone]

## 2024-02-23 NOTE — TELEPHONE ENCOUNTER
----- Message from Angel Burks sent at 2/23/2024 11:57 AM CST -----  Contact: patient  Susu Luther would like a call back at [phone], in regards to [facility] needing to be contacted about the rods and her stocking for her lymphedema . Ernesto beckford can be contacted @ [phone]

## 2024-02-26 ENCOUNTER — OFFICE VISIT (OUTPATIENT)
Dept: INTERNAL MEDICINE | Facility: CLINIC | Age: 74
End: 2024-02-26
Payer: MEDICARE

## 2024-02-26 DIAGNOSIS — R25.1 TREMORS OF NERVOUS SYSTEM: ICD-10-CM

## 2024-02-26 DIAGNOSIS — I10 PRIMARY HYPERTENSION: Primary | ICD-10-CM

## 2024-02-26 DIAGNOSIS — I89.0 LYMPHEDEMA: ICD-10-CM

## 2024-02-26 DIAGNOSIS — I50.32 CHRONIC DIASTOLIC CONGESTIVE HEART FAILURE: ICD-10-CM

## 2024-02-26 DIAGNOSIS — I26.99 ACUTE PULMONARY EMBOLISM, UNSPECIFIED PULMONARY EMBOLISM TYPE, UNSPECIFIED WHETHER ACUTE COR PULMONALE PRESENT: ICD-10-CM

## 2024-02-26 PROCEDURE — 1157F ADVNC CARE PLAN IN RCRD: CPT | Mod: HCNC,CPTII,95, | Performed by: FAMILY MEDICINE

## 2024-02-26 PROCEDURE — 99214 OFFICE O/P EST MOD 30 MIN: CPT | Mod: HCNC,95,, | Performed by: FAMILY MEDICINE

## 2024-02-26 PROCEDURE — 4010F ACE/ARB THERAPY RXD/TAKEN: CPT | Mod: HCNC,CPTII,95, | Performed by: FAMILY MEDICINE

## 2024-02-26 RX ORDER — SACUBITRIL AND VALSARTAN 24; 26 MG/1; MG/1
1 TABLET, FILM COATED ORAL 2 TIMES DAILY
Qty: 120 TABLET | Refills: 2 | Status: SHIPPED | OUTPATIENT
Start: 2024-02-26 | End: 2024-02-27

## 2024-02-26 RX ORDER — SACUBITRIL AND VALSARTAN 24; 26 MG/1; MG/1
1 TABLET, FILM COATED ORAL 2 TIMES DAILY
Qty: 120 TABLET | Refills: 2 | Status: SHIPPED | OUTPATIENT
Start: 2024-02-26 | End: 2024-02-26

## 2024-02-26 RX ORDER — METOPROLOL SUCCINATE 25 MG/1
12.5 TABLET, EXTENDED RELEASE ORAL DAILY
Qty: 15 TABLET | Refills: 11 | Status: SHIPPED | OUTPATIENT
Start: 2024-02-26 | End: 2024-03-08

## 2024-02-26 NOTE — PROGRESS NOTES
Subjective:    The patient location is: [place]  The chief complaint leading to consultation is: blood pressure     Visit type: audiovisual    Face to Face time with patient: 25  35 minutes of total time spent on the encounter, which includes face to face time and non-face to face time preparing to see the patient (eg, review of tests), Obtaining and/or reviewing separately obtained history, Documenting clinical information in the electronic or other health record, Independently interpreting results (not separately reported) and communicating results to the patient/family/caregiver, or Care coordination (not separately reported).         Each patient to whom he or she provides medical services by telemedicine is:  (1) informed of the relationship between the physician and patient and the respective role of any other health care provider with respect to management of the patient; and (2) notified that he or she may decline to receive medical services by telemedicine and may withdraw from such care at any time.    Notes:     Patient ID: Susu Luther is a 74 y.o. female.    Chief Complaint: No chief complaint on file.    Had /101, prior to physical therapy but was not allowed to start session, completed annual FOBT  Checked blood today prior to visit, /96, taking clonidine 0.2 mg, need lymphedema wrap to be approved through insurance   Will be starting iron infusions soon, had DEXA scan last year that was normal, requesting to cancel new DEXA ordered.      Hypertension  This is a recurrent problem. The current episode started more than 1 year ago. The problem has been gradually worsening since onset. The problem is resistant. Associated symptoms include anxiety, chest pain, headaches, malaise/fatigue, peripheral edema, PND, shortness of breath and sweats. Pertinent negatives include no blurred vision, neck pain, orthopnea or palpitations. There are no associated agents to hypertension. Risk factors  for coronary artery disease include family history and post-menopausal state. Past treatments include alpha 1 blockers and beta blockers. The current treatment provides no improvement. There are no compliance problems.      Review of Systems   Constitutional:  Positive for malaise/fatigue.   Eyes:  Negative for blurred vision.   Respiratory:  Positive for shortness of breath.    Cardiovascular:  Positive for chest pain and PND. Negative for palpitations and orthopnea.   Musculoskeletal:  Negative for neck pain.   Neurological:  Positive for headaches.       Objective:      Physical Exam  Constitutional:       General: She is not in acute distress.  HENT:      Head: Normocephalic and atraumatic.   Eyes:      General: No scleral icterus.        Right eye: No discharge.         Left eye: No discharge.   Pulmonary:      Effort: Pulmonary effort is normal.   Neurological:      Mental Status: She is alert and oriented to person, place, and time.   Psychiatric:         Mood and Affect: Mood normal.         Behavior: Behavior normal.         Assessment:       1. Primary hypertension    2. Acute pulmonary embolism, unspecified pulmonary embolism type, unspecified whether acute cor pulmonale present    3. Tremors of nervous system    4. Chronic diastolic congestive heart failure    5. Lymphedema        Plan:   1. Primary hypertension  Chronic, uncontrolled, due to issues with non-pitting edema on calcium channel blockers will add metoprolol XL to regimen, recommend nurse visit for BP check after starting metoprolol XL  -     metoprolol succinate (TOPROL-XL) 25 MG 24 hr tablet; Take 0.5 tablets (12.5 mg total) by mouth once daily.  Dispense: 15 tablet; Refill: 11    2. Acute pulmonary embolism, unspecified pulmonary embolism type, unspecified whether acute cor pulmonale present  Chronic, given history of recurrence and previous history of malignancy of the breast, advised to continue Eliquis for life, refill sent today  -      apixaban (ELIQUIS) 5 mg Tab; Take 1 tablet (5 mg total) by mouth 2 (two) times daily.  Dispense: 180 tablet; Refill: 1    3. Tremors of nervous system  Chronic, uncontrolled, patient reports her appointment with neurology continues to get scheduled and rescheduled, request referral to external neurologist   -     Ambulatory referral/consult to Neurology; Future; Expected date: 03/04/2024    4. Chronic diastolic congestive heart failure  Chronic, stable, continue Rx med, refill of Entresto sent  -     sacubitriL-valsartan (ENTRESTO) 24-26 mg per tablet; Take 1 tablet by mouth 2 (two) times daily.  Dispense: 120 tablet; Refill: 2    5. Lymphedema  Chronic, improving, has started ocupation therapy- lymphedema clinic at [facility] physical therapy, recommend patient elevate legs if possible and obtain leg wraps.      Future Appointments   Date Time Provider Department Center   2/29/2024  2:30 PM [facility]   2/29/2024  3:00 PM CHAIR 08 [facility]   3/1/2024 12:00 PM Karissa Tao NP [facility]   3/4/2024  3:00 PM CHAIR 02 [facility] [place]   3/7/2024  2:00 PM CHAIR 04 [facility] [place]   3/11/2024  2:30 PM CHAIR 05 [facility] [place]   3/14/2024  1:00 PM CHAIR 05 [facility] [place]   3/15/2024  1:00 PM Tessie Payan NP [facility] IM [place]   4/8/2024  2:20 PM Gwyn Griffith MD [facility] NEURO [facility]   4/15/2024 10:10 AM LABORATORY, [facility] LAB [facility]   4/17/2024  2:00 PM Jaylyn Kraft FNP [facility] HEM ONC [facility]   5/22/2024 11:20 AM Arlene Hobbs MD [facility] CARDIO [facility]   5/29/2024 10:00 AM Barry Edwards MD [facility] IN [facility]   6/4/2024  2:20 PM Gwyn Griffith MD [facility] NEURO [facility]         Rubi Littlejohn MD  Family Medicine

## 2024-02-27 ENCOUNTER — CLINICAL SUPPORT (OUTPATIENT)
Dept: INTERNAL MEDICINE | Facility: CLINIC | Age: 74
End: 2024-02-27
Payer: MEDICARE

## 2024-02-27 ENCOUNTER — PATIENT MESSAGE (OUTPATIENT)
Dept: CARDIOLOGY | Facility: CLINIC | Age: 74
End: 2024-02-27
Payer: MEDICARE

## 2024-02-27 ENCOUNTER — PATIENT OUTREACH (OUTPATIENT)
Dept: ADMINISTRATIVE | Facility: HOSPITAL | Age: 74
End: 2024-02-27
Payer: MEDICARE

## 2024-02-27 VITALS — SYSTOLIC BLOOD PRESSURE: 138 MMHG | DIASTOLIC BLOOD PRESSURE: 84 MMHG

## 2024-02-27 DIAGNOSIS — I10 PRIMARY HYPERTENSION: Primary | ICD-10-CM

## 2024-02-27 RX ORDER — SACUBITRIL AND VALSARTAN 24; 26 MG/1; MG/1
1 TABLET, FILM COATED ORAL 2 TIMES DAILY
Qty: 120 TABLET | Refills: 2 | Status: SHIPPED | OUTPATIENT
Start: 2024-02-27 | End: 2024-06-19 | Stop reason: SDUPTHER

## 2024-02-27 NOTE — PROGRESS NOTES
ANNUAL WELLNESS VISIT: Received a message from staff, pt serafin awv, spoke to pt she has been scheduled to see Tessie Payan NP 03/15/24 1pm.

## 2024-02-27 NOTE — PROGRESS NOTES
Established Patient - TeleHealth Visit    The patient location is: [place]  The chief complaint leading to consultation is: chronic pain     Visit type: audiovisual    Face to Face time with patient: 10-15 minutes  20 minutes of total time spent on the encounter, which includes face to face time and non-face to face time preparing to see the patient (eg, review of tests), Obtaining and/or reviewing separately obtained history, Documenting clinical information in the electronic or other health record, Independently interpreting results (not separately reported) and communicating results to the patient/family/caregiver, or Care coordination (not separately reported).     Each patient to whom he or she provides medical services by telemedicine is:  (1) informed of the relationship between the physician and patient and the respective role of any other health care provider with respect to management of the patient; and (2) notified that he or she may decline to receive medical services by telemedicine and may withdraw from such care at any time.        Established chronic pain patient note (follow-up visit):    Chief Pain Complaint:  10 week follow up  Lower back pain  Falls  Interval History (3/1/2024):  Patient Susu Luther presents today for follow-up visit.  Patient is being seen today for review of lumbar MRI and hip x-rays.  She rates her pain today an 8.5/10.  She complains of lower back pain which radiates down the back of the legs down to the feet.  She has had a couple of falls which she feels aggravated her pain even more.  She has been in physical therapy which has helped some.   She also suffers from chronic lymphedema in the bilateral lower extremities and has an appointment in the upcoming couple of weeks to have her legs wrapped.  Patient denies night fever/night sweats, urinary incontinence, bowel incontinence, significant weight loss and significant motor weakness.   Patient denies any other  complaints or concerns at this time.      Interval History (2/2/2024):  Patient  presents today for follow-up visit.  Patient was last seen on 10/18/2023. Patient reports pain as 9/10 today.  She has a h/o lymphedema, currently in therapy for this. Attending three times a week. Utilizes compression garments on both legs.   Patient reports her BP has been elevated for the past 2 weeks. Has reached out to PCP regarding this.   Patient has pain in her lower back. Associates this to falling in her bathroom in July of last year. States she does not even remember going to the bathroom.  nurse came and called for assistance. She was admitted after this fall for 5 days.     Currently she has pain in her lower back and legs. She has diffuse leg pain  She also reports recent falls- 1 in January and 2 in December. Ambulates with a Rolator.      History of Present Illness 10/18/23:   Susu Luther is a 74 y.o. female  who is presenting with a chief complaint of right axillary and upper extremity Pain. The patient began experiencing this problem insidiously, and the pain has been gradually worsening over the past 3 month(s). The pain is described as throbbing, cramping, burning, aching and heavy and is located in the right axilla . Pain is intermittent and lasts hours. The  pain is nonradiating. The patient rates her pain a 7 out of ten and interferes with activities of daily living a 7 out of ten. Pain is exacerbated by rasing the right arm, lying on the right side, and is improved by rest. Patient reports no prior trauma, no prior spinal surgery  note, patient has a history of breast cancer    - pertinent negatives: No fever, No chills, No weight loss, No bladder dysfunction, No bowel dysfunction, No saddle anesthesia  - pertinent positives: none    - medications, other therapies tried (physical therapy, injections):     >> NSAIDs, Tylenol, Tramadol, Norco, Percocet and gabapentin    >> Has NOT previously undergone  Physical Therapy    >> Has NOT previously undergone spinal injection/s      Imaging / Labs / Studies (reviewed on 3/1/2024):  2/29/2024 Lumbar MRI  FINDINGS:  Alignment: Normal.     Vertebrae: Normal marrow signal. No fracture.     Discs: Severe narrowing of the L5-S1 disc space.  Otherwise normal height and signal.     Cord: Normal.  Conus terminates at L1.     Degenerative findings:     T12-L1: No significant neural foraminal narrowing or spinal canal stenosis.     L1-L2: Mild facet arthropathy and minimal annular bulge contributing to mild inferior left foraminal narrowing.  No significant spinal canal stenosis.     L2-L3: Mild facet arthropathy contributing to mild bilateral inferior foraminal narrowing.  No significant spinal canal stenosis.     L3-L4: Bilateral facet arthropathy and minimal left foraminal annular bulge contributing to mild bilateral inferior foraminal narrowing.  No significant spinal canal stenosis.     L4-L5: Bilateral hypertrophic facet arthropathy resulting in mild bilateral inferior foraminal narrowing.  No significant spinal canal stenosis.     L5-S1: Posterior endplate ridging and annular bulge.  Bilateral facet arthropathy.  Resultant mild to moderate left and mild right inferior foraminal narrowing.  No significant spinal canal stenosis.     Paraspinal muscles & soft tissues: Unremarkable.     Impression:     Multilevel lumbar spondylosis and degenerative disc disease, most pronounced at L5-S1.    2/2/2024 xray hips  FINDINGS:  The bony pelvis is intact. Both femoral heads are well seated within acetabula.  There is severe axial hip joint space narrowing on the right with prominent osteophyte formation identified.  The hip joint space on the left appears to be relatively well preserved.  No plain film evidence to suggest AVN of either femoral head.  Calcified phleboliths seen projecting over the pelvis.    Results for orders placed during the hospital encounter of 05/18/20    X-Ray  Cervical Spine Complete 5 view    Narrative  EXAMINATION:  XR CERVICAL SPINE COMPLETE 5 VIEW    CLINICAL HISTORY:  Exposure to other specified factors, sequela.  Accident, sequela.    FINDINGS:  There is no cervical spine fracture or malalignment.  C5-6 spondylosis with anterior spurring.  Otherwise, unremarkable cervical spine x-ray.    Impression  See above.      Electronically signed by: Naun Jennings MD  Date:    05/18/2020  Time:    11:58      Review of Systems:  CONSTITUTIONAL: patient denies any fever, chills, or weight loss  SKIN: patient denies any rash or itching  RESPIRATORY: patient denies having any shortness of breath  GASTROINTESTINAL: patient denies having any diarrhea, constipation, or bowel incontinence  GENITOURINARY: patient denies having any abnormal bladder function    MUSCULOSKELETAL:  - patient complains of the above noted pain/s (see chief pain complaint)    NEUROLOGICAL:   - pain as above  - strength in Upper extremities is intact, BILATERALLY  - sensation in Upper extremities is intact, BILATERALLY  - patient denies any loss of bowel or bladder control      PSYCHIATRIC: patient denies any change in mood    Other:  All other systems reviewed and are negative      Physical Exam:  LMP  (LMP Unknown)  (reviewed on 3/1/2024)  Telemedicine Exam  There were no vitals filed for this visit.  There is no height or weight on file to calculate BMI.   (reviewed on 3/1/2024)          Physical Exam: last in clinic visit:    General: Alert and oriented, in no apparent distress.  Gait: normal gait.  Skin: No rashes, No discoloration, No obvious lesions  HEENT: Normocephalic, atraumatic. Pupils equal and round.  Cardiovascular: Regular rate and rhythm , moderately significant peripheral edema present in the bilateral lower extremities and right upper extremity  Respiratory: Without audible wheezing, without use of accessory muscles of respiration.    Musculoskeletal:  Musculoskeletal - Lumbar Spine:  -  Spinal Sensation: Normal   - Pain on flexion of lumbar spine: Present  - Pain on extension of lumbar spine: Present   - TTP over the lumbar facet joints: Present   - TTP over the lumbar paraspinals: Present   - SLR test equivocal bilaterally    Strength:  UE R/L: D: 5/5; B: 5/5; T: 5/5; WF: 5/5; WE: 5/5; IO: 5/5;  LE R/L: HF: 5/5, HE: 5/5, KF: 5/5; KE: 5/5; FE: 5/5; FF: 5/5    Extremity Reflexes: Brisk and symmetric throughout.      Extremity Sensory: Sensation to pinprick and temperature symmetric. Proprioception intact.      Psych:  Mood and affect is appropriate      Assessment:    Susu Luther is a 74 y.o. year old female who is presenting with     Encounter Diagnoses   Name Primary?    Lumbar radiculopathy Yes    DDD (degenerative disc disease), lumbar     Lumbar facet arthropathy     Arthritis of right hip          Plan:    1. Interventional: Schedule bilateral L5/S1 TF GREG for lumbar radiculopathy  Explained the risks and benefits of the procedure in detail with the patient today in clinic along with alternative treatment options, and the patient elected to pursue the intervention.    Will request from cardiologist to hold eliquis x 3 days    Consider R IA hip injection     2. Pharmacologic: Continue Gabapentin 400mg TID - do not recommend increasing dosage due to kidney function         Refill Tizanidine 4 mg Po BID PRN. -We have discussed potential deleterious side effects associated with this medication including  dizziness, drowsiness, dry mouth or tingling sensation in the upper or lower extremities.             Patient requests short term supply of Norco 5/325. Will route 7 day supply to Dr. Edwards for approval.     PCP has been prescribing Tramadol for pain.   Patient was previously prescribed Percocet 7.5/325 mg Po TID (90 tabs) on 5/25/2021 by Dr Clarke and Dr. Arias.      report:  Reviewed and consistent with medication use as prescribed.        3. Rehabilitative: Continue physical therapy  in [place] ([facility]    4. Diagnostic:  Reviewed MRI lumbar spine + hip/pelvic x-rays reviewed      5. Follow up:  4 weeks after injection  Discussed her BP, it has been 160-180 systolic. No CP. She has reached out to her PCP. If BP over weekend >180 or any CP or HA, she will go to BEN Tao NP  Interventional Pain Medicine  [facility] [place]

## 2024-02-28 PROBLEM — I89.0 LYMPHEDEMA: Status: ACTIVE | Noted: 2024-02-28

## 2024-02-28 RX ORDER — DIPHENHYDRAMINE HYDROCHLORIDE 50 MG/ML
50 INJECTION INTRAMUSCULAR; INTRAVENOUS ONCE AS NEEDED
Status: CANCELLED | OUTPATIENT
Start: 2024-02-28

## 2024-02-28 RX ORDER — EPINEPHRINE 0.3 MG/.3ML
0.3 INJECTION SUBCUTANEOUS ONCE AS NEEDED
Status: CANCELLED | OUTPATIENT
Start: 2024-02-28

## 2024-02-28 RX ORDER — SODIUM CHLORIDE 0.9 % (FLUSH) 0.9 %
10 SYRINGE (ML) INJECTION
Status: CANCELLED | OUTPATIENT
Start: 2024-02-28

## 2024-02-28 RX ORDER — HEPARIN 100 UNIT/ML
500 SYRINGE INTRAVENOUS
Status: CANCELLED | OUTPATIENT
Start: 2024-02-28

## 2024-02-29 ENCOUNTER — HOSPITAL ENCOUNTER (OUTPATIENT)
Dept: RADIOLOGY | Facility: HOSPITAL | Age: 74
Discharge: HOME | End: 2024-02-29
Attending: PHYSICIAN ASSISTANT
Payer: MEDICARE

## 2024-02-29 ENCOUNTER — INFUSION (OUTPATIENT)
Dept: INFUSION THERAPY | Facility: HOSPITAL | Age: 74
End: 2024-02-29
Attending: NURSE PRACTITIONER
Payer: MEDICARE

## 2024-02-29 VITALS
OXYGEN SATURATION: 98 % | HEART RATE: 72 BPM | RESPIRATION RATE: 16 BRPM | DIASTOLIC BLOOD PRESSURE: 86 MMHG | SYSTOLIC BLOOD PRESSURE: 178 MMHG | TEMPERATURE: 98 F

## 2024-02-29 DIAGNOSIS — M54.16 LUMBAR RADICULOPATHY: ICD-10-CM

## 2024-02-29 DIAGNOSIS — M25.552 BILATERAL HIP PAIN: ICD-10-CM

## 2024-02-29 DIAGNOSIS — E61.1 IRON DEFICIENCY: Primary | ICD-10-CM

## 2024-02-29 DIAGNOSIS — M25.551 BILATERAL HIP PAIN: ICD-10-CM

## 2024-02-29 DIAGNOSIS — I89.0 LYMPHEDEMA: ICD-10-CM

## 2024-02-29 DIAGNOSIS — M54.50 DORSALGIA OF LUMBAR REGION: ICD-10-CM

## 2024-02-29 PROCEDURE — 96374 THER/PROPH/DIAG INJ IV PUSH: CPT | Mod: HCNC

## 2024-02-29 PROCEDURE — 72148 MRI LUMBAR SPINE W/O DYE: CPT | Mod: TC,HCNC

## 2024-02-29 PROCEDURE — 72148 MRI LUMBAR SPINE W/O DYE: CPT | Mod: 26,HCNC,, | Performed by: RADIOLOGY

## 2024-02-29 PROCEDURE — 63600175 PHARM REV CODE 636 W HCPCS: Mod: HCNC | Performed by: NURSE PRACTITIONER

## 2024-02-29 RX ORDER — HEPARIN 100 UNIT/ML
500 SYRINGE INTRAVENOUS
Status: CANCELLED | OUTPATIENT
Start: 2024-03-07

## 2024-02-29 RX ORDER — SODIUM CHLORIDE 0.9 % (FLUSH) 0.9 %
10 SYRINGE (ML) INJECTION
Status: CANCELLED | OUTPATIENT
Start: 2024-03-07

## 2024-02-29 RX ORDER — EPINEPHRINE 0.3 MG/.3ML
0.3 INJECTION SUBCUTANEOUS ONCE AS NEEDED
Status: CANCELLED | OUTPATIENT
Start: 2024-03-07

## 2024-02-29 RX ORDER — DIPHENHYDRAMINE HYDROCHLORIDE 50 MG/ML
50 INJECTION INTRAMUSCULAR; INTRAVENOUS ONCE AS NEEDED
Status: CANCELLED | OUTPATIENT
Start: 2024-03-07

## 2024-02-29 RX ADMIN — IRON SUCROSE 200 MG: 20 INJECTION, SOLUTION INTRAVENOUS at 03:02

## 2024-02-29 NOTE — DISCHARGE INSTRUCTIONS
Thank you for letting me take care of you today!  - Adwoa SANTILLAN RN      [facility]  [address] [address]  [phone] phone     [phone] fax  Hours of Operation: Monday- Friday 8:00am- 5:00pm  After hours phone  [phone]  Hematology / Oncology Physicians on call    Dr. Eric Ch, BRANDIN Burks, EUGENE Sylvester, EUGENE Fulton, NP      Please call with any concerns regarding your appointment today.      Venofer (Iron Sucrose):    Common side effects:  Pain, itching, burning, swelling, or a lump at injection site  Muscle cramps  Diarrhea, nausea, vomiting  Headache    This medicine could lower your blood pressure too much and cause you to feel dizzy or lightheaded.   Stand or sit up slowly if you are dizzy.    Call your doctor right away if you have:  Chest pain  Passing out  Difficulty breathing  Excessive sweating  Throat tightness

## 2024-02-29 NOTE — PLAN OF CARE
Patient tolerated Venofer well today; no adverse reaction noted.  POC reviewed with pt.  No questions or concerns voiced.  NAD noted upon discharge.  No significant new complaints voiced.   Has f/u appt(s) scheduled per MD request.    Problem: Adult Inpatient Plan of Care  Goal: Plan of Care Review  Outcome: Ongoing, Progressing  Goal: Patient-Specific Goal (Individualized)  Outcome: Ongoing, Progressing  Goal: Absence of Hospital-Acquired Illness or Injury  Outcome: Ongoing, Progressing  Intervention: Identify and Manage Fall Risk  Flowsheets (Taken 2/29/2024 1508)  Safety Promotion/Fall Prevention: in recliner, wheels locked  Goal: Optimal Comfort and Wellbeing  Outcome: Ongoing, Progressing  Intervention: Provide Person-Centered Care  Flowsheets (Taken 2/29/2024 1508)  Trust Relationship/Rapport:   care explained   questions encouraged   choices provided   reassurance provided   emotional support provided   empathic listening provided   thoughts/feelings acknowledged   questions answered

## 2024-03-01 ENCOUNTER — TELEPHONE (OUTPATIENT)
Dept: PAIN MEDICINE | Facility: CLINIC | Age: 74
End: 2024-03-01

## 2024-03-01 ENCOUNTER — TELEPHONE (OUTPATIENT)
Dept: INTERNAL MEDICINE | Facility: CLINIC | Age: 74
End: 2024-03-01
Payer: MEDICARE

## 2024-03-01 ENCOUNTER — OFFICE VISIT (OUTPATIENT)
Dept: PAIN MEDICINE | Facility: CLINIC | Age: 74
End: 2024-03-01
Payer: OTHER GOVERNMENT

## 2024-03-01 DIAGNOSIS — M54.16 LUMBAR RADICULOPATHY: Primary | ICD-10-CM

## 2024-03-01 DIAGNOSIS — M51.36 DDD (DEGENERATIVE DISC DISEASE), LUMBAR: ICD-10-CM

## 2024-03-01 DIAGNOSIS — M47.816 LUMBAR FACET ARTHROPATHY: ICD-10-CM

## 2024-03-01 DIAGNOSIS — Z79.01 ANTICOAGULATED: Primary | ICD-10-CM

## 2024-03-01 DIAGNOSIS — M16.11 ARTHRITIS OF RIGHT HIP: ICD-10-CM

## 2024-03-01 PROCEDURE — 99214 OFFICE O/P EST MOD 30 MIN: CPT | Mod: 95,,, | Performed by: NURSE PRACTITIONER

## 2024-03-01 RX ORDER — TIZANIDINE 4 MG/1
4 TABLET ORAL 2 TIMES DAILY PRN
Qty: 60 TABLET | Refills: 2 | Status: SHIPPED | OUTPATIENT
Start: 2024-03-01 | End: 2024-03-04

## 2024-03-01 NOTE — TELEPHONE ENCOUNTER
Called pt, notified of provider recommendations of taking metoprolol xl 25mg whole tablet daily and 2 tablets of clonidine prn with bp greater than 170/90. Pt verbalized understanding. States she is not home right now but will take note and begin taking whole tablet of metoprolol on tomorrow.     Pt reports Dr. Edwards recommended steroid injections but was supposed to reach out to Dr. Littlejohn d/t blood pressure concerns. Advised pt I would notify Dr. Littlejohn.     ----- Message from Rubi Littlejohn MD sent at 3/1/2024  9:17 AM CST -----  Regarding: FW: Please contact patient  Good morning Darcie. Please contact patient and inform her to take metoprolol XL 25 mg (entire tablet, previous prescription was 1/2 tablet) and continue to take 2 tablets of clonidine as needed for BP greater than 170/90.    Dr. Littlejohn   ----- Message -----  From: Dang Saez RN  Sent: 2/29/2024   4:00 PM CST  To: Rubi Littlejohn MD  Subject: Please contact patient                           Ms. Luther presented today for IV iron infusion. Her BP at the time of arrival was 184/91. Upon time to leave it was 191/99. I took a manual after letting her sit a few more minutes and it was 178/86. She wanted me to contact you to see what you would recommend for her to do. She states that her BP is high often. If you could please contact her at your earliest convenience. Thank you.

## 2024-03-01 NOTE — TELEPHONE ENCOUNTER
----- Message from Perla Sharif sent at 3/1/2024 11:18 AM CST -----  Contact: [facility]  [facility] is calling to follow up on plan of care form hat was faced on 2/21, reports needing signed and sent back between today or Wednesday. Please call Natalie at [phone] Ext # [phone]      Fax# [phone]

## 2024-03-01 NOTE — TELEPHONE ENCOUNTER
Cardiac Clearance has been sent to Dr Hobbs to hold clearance for 3 days to have LESI with Dr Edwards.    .[facility]

## 2024-03-01 NOTE — TELEPHONE ENCOUNTER
----- Message from Karissa Tao NP sent at 3/1/2024 12:13 PM CST -----  Pain Provider:phani  Patient Name: jessica chase  MRN: [id number]  Case:bilateral L5/S1 TF GREG  Level:L5/S1  Laterality:bilateral  Anticoagulation:eliquis  Length to hold:3 days  Rx Provider: Kiersten Hobbs    4 week follow up

## 2024-03-04 ENCOUNTER — INFUSION (OUTPATIENT)
Dept: INFUSION THERAPY | Facility: HOSPITAL | Age: 74
End: 2024-03-04
Attending: NURSE PRACTITIONER
Payer: MEDICARE

## 2024-03-04 ENCOUNTER — PATIENT MESSAGE (OUTPATIENT)
Dept: PAIN MEDICINE | Facility: CLINIC | Age: 74
End: 2024-03-04
Payer: MEDICARE

## 2024-03-04 VITALS
HEART RATE: 66 BPM | TEMPERATURE: 98 F | SYSTOLIC BLOOD PRESSURE: 181 MMHG | OXYGEN SATURATION: 98 % | RESPIRATION RATE: 20 BRPM | DIASTOLIC BLOOD PRESSURE: 87 MMHG

## 2024-03-04 DIAGNOSIS — E61.1 IRON DEFICIENCY: Primary | ICD-10-CM

## 2024-03-04 PROCEDURE — A4216 STERILE WATER/SALINE, 10 ML: HCPCS | Mod: HCNC | Performed by: NURSE PRACTITIONER

## 2024-03-04 PROCEDURE — 96374 THER/PROPH/DIAG INJ IV PUSH: CPT | Mod: HCNC

## 2024-03-04 PROCEDURE — 63600175 PHARM REV CODE 636 W HCPCS: Mod: HCNC | Performed by: NURSE PRACTITIONER

## 2024-03-04 PROCEDURE — 25000003 PHARM REV CODE 250: Mod: HCNC | Performed by: NURSE PRACTITIONER

## 2024-03-04 RX ORDER — SODIUM CHLORIDE 0.9 % (FLUSH) 0.9 %
10 SYRINGE (ML) INJECTION
Status: CANCELLED | OUTPATIENT
Start: 2024-03-11

## 2024-03-04 RX ORDER — METHOCARBAMOL 500 MG/1
500 TABLET, FILM COATED ORAL 2 TIMES DAILY PRN
Qty: 60 TABLET | Refills: 0 | Status: SHIPPED | OUTPATIENT
Start: 2024-03-04 | End: 2024-03-07 | Stop reason: SDUPTHER

## 2024-03-04 RX ORDER — SODIUM CHLORIDE 0.9 % (FLUSH) 0.9 %
10 SYRINGE (ML) INJECTION
Status: DISCONTINUED | OUTPATIENT
Start: 2024-03-04 | End: 2024-03-04 | Stop reason: HOSPADM

## 2024-03-04 RX ORDER — EPINEPHRINE 0.3 MG/.3ML
0.3 INJECTION SUBCUTANEOUS ONCE AS NEEDED
Status: CANCELLED | OUTPATIENT
Start: 2024-03-11

## 2024-03-04 RX ORDER — DIPHENHYDRAMINE HYDROCHLORIDE 50 MG/ML
50 INJECTION INTRAMUSCULAR; INTRAVENOUS ONCE AS NEEDED
Status: CANCELLED | OUTPATIENT
Start: 2024-03-11

## 2024-03-04 RX ORDER — HEPARIN 100 UNIT/ML
500 SYRINGE INTRAVENOUS
Status: CANCELLED | OUTPATIENT
Start: 2024-03-11

## 2024-03-04 RX ADMIN — Medication 10 ML: at 03:03

## 2024-03-04 RX ADMIN — IRON SUCROSE 200 MG: 20 INJECTION, SOLUTION INTRAVENOUS at 03:03

## 2024-03-04 NOTE — DISCHARGE INSTRUCTIONS
Thank you for letting me take care of YOU!!          [facility]  [address] [facility]  [address] [place] [address] Drive  [phone] phone     [phone] fax  Hours of Operation: Monday- Friday 8:00am- 5:00pm  After hours phone  [phone]  Hematology / Oncology Physicians on call:    Dr. Eric Snowden        Nurse Practitioners:    EUGENE Macias NP Jessica Porter, PA Phaon Dunbar, NP Khelsea Conley, EUGENE Lopez, EUGENE      Please don't hesitate to call if you have any concerns.

## 2024-03-04 NOTE — PLAN OF CARE
Patient tolerated Venofer well today; no adverse reaction noted.  C/O SOB with exertion.  IV discontinued with catheter intact and pressure dressing applied to the site.  Has f/u appt(s) scheduled per MD request.  No questions or concerns voiced.  NAD noted upon discharge.

## 2024-03-04 NOTE — NURSING
Patient came in today with elevated BP just like on previous visit.  Stated she has been taking htn medication as prescribed by Dr Littlejohn last week, but has not been taking the Clonidine prn as instructed.  She does not have Clonidine with her today.  She stated she did not go to ER after last iron infusion.  Notified BRANDIN Gracia (hem-oncology) about patient and okay to give iron today.  Reinforced to take BP 3x/day as instructed and write down for Dr. Littlejohn since she doesn't think it is working very well.  She is aware that she needs to get with primary and/or cardiology about her htn.   NAD noted upon discharge.

## 2024-03-04 NOTE — TELEPHONE ENCOUNTER
I signed this but it is in my bag. I will get Ricardo to bring tomorrow or I will bring it on Wednesday before my appointment.

## 2024-03-07 RX ORDER — METHOCARBAMOL 500 MG/1
500 TABLET, FILM COATED ORAL 2 TIMES DAILY PRN
Qty: 60 TABLET | Refills: 2 | Status: SHIPPED | OUTPATIENT
Start: 2024-03-07 | End: 2024-06-05

## 2024-03-08 ENCOUNTER — PATIENT MESSAGE (OUTPATIENT)
Dept: CARDIOLOGY | Facility: CLINIC | Age: 74
End: 2024-03-08
Payer: MEDICARE

## 2024-03-08 ENCOUNTER — OFFICE VISIT (OUTPATIENT)
Dept: INTERNAL MEDICINE | Facility: CLINIC | Age: 74
End: 2024-03-08
Payer: MEDICARE

## 2024-03-08 VITALS — HEART RATE: 92 BPM | DIASTOLIC BLOOD PRESSURE: 96 MMHG | SYSTOLIC BLOOD PRESSURE: 150 MMHG

## 2024-03-08 DIAGNOSIS — N18.32 CHRONIC KIDNEY DISEASE (CKD) STAGE G3B/A3, MODERATELY DECREASED GLOMERULAR FILTRATION RATE (GFR) BETWEEN 30-44 ML/MIN/1.73 SQUARE METER AND ALBUMINURIA CREATININE RATIO GREATER THAN 300 MG/G: ICD-10-CM

## 2024-03-08 DIAGNOSIS — I10 PRIMARY HYPERTENSION: Primary | ICD-10-CM

## 2024-03-08 PROCEDURE — 1101F PT FALLS ASSESS-DOCD LE1/YR: CPT | Mod: HCNC,CPTII,95, | Performed by: FAMILY MEDICINE

## 2024-03-08 PROCEDURE — 4010F ACE/ARB THERAPY RXD/TAKEN: CPT | Mod: HCNC,CPTII,95, | Performed by: FAMILY MEDICINE

## 2024-03-08 PROCEDURE — 3077F SYST BP >= 140 MM HG: CPT | Mod: HCNC,CPTII,95, | Performed by: FAMILY MEDICINE

## 2024-03-08 PROCEDURE — 99443 PR PHYSICIAN TELEPHONE EVALUATION 21-30 MIN: CPT | Mod: HCNC,95,, | Performed by: FAMILY MEDICINE

## 2024-03-08 PROCEDURE — 1157F ADVNC CARE PLAN IN RCRD: CPT | Mod: HCNC,CPTII,95, | Performed by: FAMILY MEDICINE

## 2024-03-08 PROCEDURE — 1159F MED LIST DOCD IN RCRD: CPT | Mod: HCNC,CPTII,95, | Performed by: FAMILY MEDICINE

## 2024-03-08 PROCEDURE — 3080F DIAST BP >= 90 MM HG: CPT | Mod: HCNC,CPTII,95, | Performed by: FAMILY MEDICINE

## 2024-03-08 PROCEDURE — 3288F FALL RISK ASSESSMENT DOCD: CPT | Mod: HCNC,CPTII,95, | Performed by: FAMILY MEDICINE

## 2024-03-08 RX ORDER — METOPROLOL SUCCINATE 25 MG/1
25 TABLET, EXTENDED RELEASE ORAL DAILY
Qty: 30 TABLET | Refills: 11 | Status: SHIPPED | OUTPATIENT
Start: 2024-03-08 | End: 2024-04-03

## 2024-03-08 NOTE — PROGRESS NOTES
Established Patient - Audio Only Telehealth Visit     The patient location is: [place]   The chief complaint leading to consultation is: Blood pressure  Visit type: Virtual visit with audio only (telephone)  Total time spent with patient: 21 minutes        The reason for the audio only service rather than synchronous audio and video virtual visit was related to technical difficulties or patient preference/necessity.     Each patient to whom I provide medical services by telemedicine is:  (1) informed of the relationship between the physician and patient and the respective role of any other health care provider with respect to management of the patient; and (2) notified that they may decline to receive medical services by telemedicine and may withdraw from such care at any time. Patient verbally consented to receive this service via voice-only telephone call.       HPI: Susu Luther is 74 y.o. female with complaints of elevated blood pressure. She has noted improvement in her blood pressure since increasing metoprolol XL to 25 mg. Denies chest pain, shortness of breath, Has lower extremity edema for which she is receiving treatment at lymph edema physical therapy with leg wraps      Assessment and plan:    1. Primary hypertension  Chronic, uncontrolled, increased metoprolol XL to 25 mg daily, blood pressure improving but still above goal   - metoprolol succinate (TOPROL-XL) 25 MG 24 hr tablet; Take 1 tablet (25 mg total) by mouth once daily.  Dispense: 30 tablet; Refill: 11    2. Chronic kidney disease (CKD) stage G3b/A3, moderately decreased glomerular filtration rate (GFR) between 30-44 mL/min/1.73 square meter and albuminuria creatinine ratio greater than 300 mg/g  Chronic, stable, given history of uncontrolled hypertension and CKD, will recommend evaluation by Nephrology for further management and treatment   - Ambulatory referral/consult to Nephrology; Future         Future Appointments   Date Time  Provider Department Center   3/15/2024  1:00 PM Tessie Payan NP [facility] IM [facility]   4/3/2024  1:00 PM Constantine Worthy MD [facility] NEPHRO [facility]   4/8/2024  2:20 PM Gwyn Griffith MD [facility]URO [facility]   4/15/2024 10:10 AM LABORATORY, [facility] [place]   4/17/2024  2:00 PM Jaylyn Kraft FNP [facility] HEM ONC [facility]   5/22/2024 11:20 AM Arlene Hobbs MD [facility]   5/29/2024 10:00 AM Barry Edwards MD [facility] IN [facility]   6/4/2024  2:20 PM Gwyn Griffith MD [facility] NEURO [facility]         This service was not originating from a related E/M service provided within the previous 7 days nor will  to an E/M service or procedure within the next 24 hours or my soonest available appointment.  Prevailing standard of care was able to be met in this audio-only visit.      Rubi Littlejohn MD  Family Medicine

## 2024-03-11 ENCOUNTER — PATIENT MESSAGE (OUTPATIENT)
Dept: HEMATOLOGY/ONCOLOGY | Facility: CLINIC | Age: 74
End: 2024-03-11
Payer: MEDICARE

## 2024-03-11 ENCOUNTER — TELEPHONE (OUTPATIENT)
Dept: INTERNAL MEDICINE | Facility: CLINIC | Age: 74
End: 2024-03-11
Payer: MEDICARE

## 2024-03-11 NOTE — TELEPHONE ENCOUNTER
I returned a called Adelaida w/ [facility] and she was inquiring about an order that she sent in Feb to be signed by  and I informed nothing was received and she confirmed the fax number as [phone] and I informed that is for another [facility] location [facility] [place] . I asked what provider was she sending it to and she stated Isma Littlejohn. I replied he does work at the [facility] however, the pt has never saw him she see's Dr.Jessica Littlejohn in [place]. So, I gave her the correct fax number and informed I will hand off to provider for signing. She verbalized understanding. //kah

## 2024-03-11 NOTE — TELEPHONE ENCOUNTER
----- Message from Vannessa Navas sent at 3/11/2024 12:05 PM CDT -----  Regarding: patient call back  Type: Patient Call Back    Who called: Shannon Chavez     What is the request in detail: Checking on the status of an order     Can the clinic reply by [facility]? No     Would the patient rather a call back or a response via [facility]? Call     Best call back number: [phone]    Additional Information:

## 2024-03-14 ENCOUNTER — PATIENT MESSAGE (OUTPATIENT)
Dept: FAMILY MEDICINE | Facility: CLINIC | Age: 74
End: 2024-03-14
Payer: MEDICARE

## 2024-03-18 ENCOUNTER — PATIENT MESSAGE (OUTPATIENT)
Dept: INTERNAL MEDICINE | Facility: CLINIC | Age: 74
End: 2024-03-18
Payer: MEDICARE

## 2024-03-18 ENCOUNTER — PATIENT MESSAGE (OUTPATIENT)
Dept: PAIN MEDICINE | Facility: CLINIC | Age: 74
End: 2024-03-18
Payer: MEDICARE

## 2024-03-18 DIAGNOSIS — F54 PSYCHOLOGICAL FACTORS AFFECTING MEDICAL CONDITION: ICD-10-CM

## 2024-03-18 RX ORDER — ALPRAZOLAM 0.5 MG/1
0.5 TABLET ORAL 2 TIMES DAILY PRN
Qty: 60 TABLET | Refills: 0 | Status: SHIPPED | OUTPATIENT
Start: 2024-03-18 | End: 2024-04-14 | Stop reason: SDUPTHER

## 2024-03-18 NOTE — TELEPHONE ENCOUNTER
Called pt, states she was unable to get her legs wrapped today d/t swelling and severe pain. States she has reached out to Dr. Edwards regarding pain, states she was told he will only prescribe for back pain at this time. Also requesting refill of xanax, transmission failed to meds by mail, would like xanax refill sent to [facility] on [address]. Please advise.       ----- Message from Helen Majano sent at 3/18/2024  4:08 PM CDT -----  Contact: self   Patient needs call back. She is wanting to know who is caring for Dr Littlejohn's patients while she is out? Her legs are swollen and hurting really bad. She states she set 2 messages through the portal as well. Please call to advise

## 2024-03-19 ENCOUNTER — PATIENT MESSAGE (OUTPATIENT)
Dept: PAIN MEDICINE | Facility: CLINIC | Age: 74
End: 2024-03-19
Payer: MEDICARE

## 2024-03-20 ENCOUNTER — OFFICE VISIT (OUTPATIENT)
Dept: CARDIOLOGY | Facility: CLINIC | Age: 74
End: 2024-03-20
Payer: MEDICARE

## 2024-03-20 ENCOUNTER — TELEPHONE (OUTPATIENT)
Dept: INTERNAL MEDICINE | Facility: CLINIC | Age: 74
End: 2024-03-20
Payer: MEDICARE

## 2024-03-20 DIAGNOSIS — I10 HYPERTENSION, UNSPECIFIED TYPE: ICD-10-CM

## 2024-03-20 DIAGNOSIS — I26.99 RECURRENT PULMONARY EMBOLI: ICD-10-CM

## 2024-03-20 DIAGNOSIS — I10 PRIMARY HYPERTENSION: Primary | ICD-10-CM

## 2024-03-20 DIAGNOSIS — G47.33 OSA (OBSTRUCTIVE SLEEP APNEA): ICD-10-CM

## 2024-03-20 DIAGNOSIS — M79.606 PAIN OF LOWER EXTREMITY, UNSPECIFIED LATERALITY: ICD-10-CM

## 2024-03-20 DIAGNOSIS — E66.01 MORBID OBESITY WITH BMI OF 45.0-49.9, ADULT: ICD-10-CM

## 2024-03-20 DIAGNOSIS — Z86.73 HISTORY OF CVA (CEREBROVASCULAR ACCIDENT): ICD-10-CM

## 2024-03-20 DIAGNOSIS — E66.01 CLASS 3 SEVERE OBESITY WITH SERIOUS COMORBIDITY AND BODY MASS INDEX (BMI) OF 40.0 TO 44.9 IN ADULT, UNSPECIFIED OBESITY TYPE: ICD-10-CM

## 2024-03-20 DIAGNOSIS — I71.40 ABDOMINAL AORTIC ANEURYSM (AAA) WITHOUT RUPTURE, UNSPECIFIED PART: ICD-10-CM

## 2024-03-20 DIAGNOSIS — R60.9 EDEMA, UNSPECIFIED TYPE: ICD-10-CM

## 2024-03-20 DIAGNOSIS — Z86.711 HISTORY OF PULMONARY EMBOLISM: ICD-10-CM

## 2024-03-20 DIAGNOSIS — F33.1 MAJOR DEPRESSIVE DISORDER, RECURRENT EPISODE, MODERATE WITH ANXIOUS DISTRESS: ICD-10-CM

## 2024-03-20 DIAGNOSIS — N18.31 CHRONIC KIDNEY DISEASE, STAGE 3A: ICD-10-CM

## 2024-03-20 DIAGNOSIS — I50.32 CHRONIC DIASTOLIC CONGESTIVE HEART FAILURE: Primary | ICD-10-CM

## 2024-03-20 DIAGNOSIS — Z17.0 MALIGNANT NEOPLASM OF UPPER-OUTER QUADRANT OF RIGHT BREAST IN FEMALE, ESTROGEN RECEPTOR POSITIVE: ICD-10-CM

## 2024-03-20 DIAGNOSIS — R53.1 RIGHT SIDED WEAKNESS: ICD-10-CM

## 2024-03-20 DIAGNOSIS — M79.2 NEUROPATHIC PAIN: ICD-10-CM

## 2024-03-20 DIAGNOSIS — C50.411 MALIGNANT NEOPLASM OF UPPER-OUTER QUADRANT OF RIGHT BREAST IN FEMALE, ESTROGEN RECEPTOR POSITIVE: ICD-10-CM

## 2024-03-20 PROCEDURE — 4010F ACE/ARB THERAPY RXD/TAKEN: CPT | Mod: HCNC,CPTII,95, | Performed by: STUDENT IN AN ORGANIZED HEALTH CARE EDUCATION/TRAINING PROGRAM

## 2024-03-20 PROCEDURE — 1157F ADVNC CARE PLAN IN RCRD: CPT | Mod: HCNC,CPTII,95, | Performed by: STUDENT IN AN ORGANIZED HEALTH CARE EDUCATION/TRAINING PROGRAM

## 2024-03-20 PROCEDURE — 99214 OFFICE O/P EST MOD 30 MIN: CPT | Mod: HCNC,95,, | Performed by: STUDENT IN AN ORGANIZED HEALTH CARE EDUCATION/TRAINING PROGRAM

## 2024-03-20 RX ORDER — CARVEDILOL 6.25 MG/1
6.25 TABLET ORAL 2 TIMES DAILY WITH MEALS
Qty: 60 TABLET | Refills: 6 | Status: SHIPPED | OUTPATIENT
Start: 2024-03-20 | End: 2024-05-28 | Stop reason: ALTCHOICE

## 2024-03-20 RX ORDER — METOLAZONE 5 MG/1
TABLET ORAL
Qty: 6 TABLET | Refills: 0 | Status: SHIPPED | OUTPATIENT
Start: 2024-03-20 | End: 2024-04-08

## 2024-03-20 NOTE — PROGRESS NOTES
Subjective:   Patient ID:  Susu Luther is a 74 y.o. female who presents for follow up of No chief complaint on file.      The patient location is: home  The chief complaint leading to consultation is: HTN, LW swelling     Visit type: audiovisual    Face to Face time with patient: 25 min  25 minutes of total time spent on the encounter, which includes face to face time and non-face to face time preparing to see the patient (eg, review of tests), Obtaining and/or reviewing separately obtained history, Documenting clinical information in the electronic or other health record, Independently interpreting results (not separately reported) and communicating results to the patient/family/caregiver, or Care coordination (not separately reported).         Each patient to whom he or she provides medical services by telemedicine is:  (1) informed of the relationship between the physician and patient and the respective role of any other health care provider with respect to management of the patient; and (2) notified that he or she may decline to receive medical services by telemedicine and may withdraw from such care at any time.    Notes:      12/1/20  71 yo female, care establish. Prior cardiologist Dr ackerman   [facility] HTN, CVA (2009), Right breast CA, Lumpectomy 3/2019, h/o PE off OAC 2 yrs ago.  AAA, s/p transcutaneous patch by Dr. Bonds, obesity knee OA imbalanced walker dependent  C/o SOB after walking few steps and dizziness. Had vision issue 1 month ago due to uncontrolled HTN  No chest pain  Sleeps with 2 pillows  Decent appetites  Leg calf pain worse at night  No smoking/drinking  ekg today NSR LVH.    ECH normal EF, grade II DD, LAE and PAP 56 mmHG   Chest CTA negative for PE  BP high    A1c controlled    S/p Open subpectoral lymph node biopsy on the right on 12/02/2020 by Dr. Starks  Still right chest, under arm and shoudler supersensitive pain      Shortness of Breath  Associated symptoms  include chest pain and leg swelling. Pertinent negatives include no abdominal pain, claudication, fever, headaches, neck pain, orthopnea, PND, rash, sputum production, syncope, vomiting or wheezing.   Chest Pain   Associated symptoms include shortness of breath. Pertinent negatives include no abdominal pain, back pain, claudication, cough, diaphoresis, dizziness, fever, headaches, irregular heartbeat, malaise/fatigue, nausea, near-syncope, numbness, orthopnea, palpitations, PND, sputum production, syncope, vomiting or weakness.   Her past medical history is significant for CHF.   Pertinent negatives for past medical history include no seizures.   Congestive Heart Failure  Associated symptoms include chest pain and shortness of breath. Pertinent negatives include no abdominal pain, claudication, near-syncope or palpitations.     2/28/22  Patient was admitted to [facility] for shortness of breath.  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  Started on heparin drip in transition to oral anticoagulation, now on Eliquis.  Recurrent PE.  On Femara. Has another lump in breast on right side, recently seen on PET scan.   Due to TANNER, was told to stop hctz, telmisartan.  She has ran out of clonidine. Does not take the ASA, hydralazine, amlodipine.   Blood pressure normotensive.  Reports severe headaches.  Has been out of Fioricet.  Echocardiogram with normal EF  Reports shortness of breath, chest heaviness mostly right-sided.      3/14/22  Still having SOB due to PE.  No bleeding issues while on eliquis.  Chest pain is substernal to right sided.   Lasix doesn't seem to help.   A reports intermittent leg pain right > left.  DVT ultrasound in-hospital with no evidence of DVT.  Following Hematology-Oncology.  Patient does not want surgery if needed for possible breast cancer.  Denies syncope, fever, chills.         4/18/22  Comes in with daughter who lives in [place].  Concerned that  she may oxygen issues and may need home oxygen. O2 98%  Reports LE swelling and SOB  Reports chest pain comes on with SOB, did not have chest pain prior to PE  Has not been on lasix, has been held  Reports balancing issues- can do PT once symptoms improve   Denies syncope, fever, chills.       5/16/22  Has been feeling weak last couple days  Feels chest tightness with walking, also CURIEL- feels worse than before. 97% O2 in clinic   Reports dizziness after taking medications   BP low today   Says recurrent cancer may be spreading   No bleeding on eliquis   Reports orthopnea, PND.  Not feeling well- Does not want to the hospital     Denies syncope.      6/13/22  Not feeling well today  Reports chest pain and SOB worsening over the last 2 weeks   Ddimer trending, downTroponin neg and BNP nl on 5/16/22  EKG today without significant abnormalities   Reports recent diagnosis of breast cancer is progressing  Reports right-sided arm weakness  Patient has been increasingly stressed    Denies syncope, lower extremity swelling.      7/6/2022   went to emergency room 6/13/2022, was worked up for stroke  MRI brain negative  Repeat CTA chest without PE, right-sided mass 6 cm, stable  All blood pressure medications.  Due to hypotension  Started taking Lasix today  Has significant lower extremity pain bilateral, reports blue feet at times      8/9/22  Virtual visit  Ambulates with walker   Had a fall recently due to syncopal episode, went to urgent care, negative workup per patient  Syncopal event occurred while standing up  Last visit Lasix was increased, patient reports increased thirst and water intake  Has also been taking carvedilol, reports low blood pressure  Chest pain worsened after syncopal event, has bruising on her body      9/7/22  Reports leg swelling, left  Has been taking eliquis also   Restarted taking lasix 2 weeks ago  Recently started on lyrica   Still wearing cardiac monitor  Still having SOB and chest  discomfort  U/S arterial w/o significant stenosis   BP elevated, high at home  Lost 8 lbs since 9/1      10/12/22  Virtual visit  Doing well, still getting chest pain   Still has shortness of breath but has improved   Was able to go to a football game without any issues   DVT ultrasound without thrombus   Has been treated for gout, improvement of toe pain  Has been having intermittent blood pressure elevated readings at home        2/20/23  Stress test normal   BP elevated now  Has been more tired  Drinking lots of water  Chest pain has improved with imdur  SOB stable   Has chronic pain and chronic fatigue         3/21/23  Patient was recently admitted to the hospital for right arm weakness, TANNER, chest pain, shortness of breath   MRI/CT scan of the brain were unremarkable   CTA did not show PE   Had elevated creatinine, renal ultrasound did not show arterial stenosis  BP noted to be significantly elevated   Blood pressure meds were changed, patient only taking Entresto and amlodipine   Coreg was stopped  Reports right-sided breast lump/mass currently being investigated    Today, reports still having shortness of breath  Waiting to have ultrasound done for right breast lump  Blood pressure stable today      4/25/23  Virtual visit   Continues to have intermittent chest pain, shortness of breath   Has not fallen since last visit   Blood pressure has been stable   Metanephrine levels are elevated  No bleeding on Eliquis      9/18/23  Virtual visit   Was admitted to [facility] 7/23 for chest pain and recurrent falls and worsening swelling in her legs  Lasix was switched to torsemide  Continues to have leg pain and swelling  Has been seeing Maria Isabel in [facility]  Currently taking torsemide 20 mg b.i.d.  No improvement in her leg pain and leg swelling since discharge from the hospital   Recent DVT ultrasound of right leg with no clot  Has been wearing compression stockings but difficult recently due to leg  swelling      9/25/23  Virtual visit   Took metolazone once  Taking torsemide 40 mg b.i.d.  Swelling improving per patient but not a lot pain   CKD mildly worsening on recent labs        9/29/23  Virtual visit  Worsening renal function with taking increased dose of torsemide and metolazone x 2 doses   Swelling has improved and patient now able to walk w/o pain  Was told to hold diuretics for 1 week and have f/u labs  No SOB  Has not gotten contacted by lymphedema clinic at [facility]      10/25/23  Swelling has improved in legs, still having pain with ambulation   Insurance did not cover lymphedema clinic at [facility] or [facility]  Has shortness of breath intermittent      1/10/24  Going to lymphedema clinic in [facility] improving, now able to walk on them  Fell x2 1 month ago   Stopped amlodipine  Fluctuations in BP, can be high at times  Wearing compression stockings  BP stable   Says gabapentin may be causing weight gain  Weight going up significantly   SOB stable   Has been eating out- has been salty       3/20/24   Virtual visit  Has been having significant lower extremity swelling  Reports someone took her torsemide  Blood pressure has been significantly elevated   Has been short of breath  Getting iron infusions and seen pain management  Taking clonidine p.r.n.   Compliant with all medications  Potassium level high limit of normal recently        Ekg 7/12/23 NSR, LAFB, LVH  EKG 2/20/23 NSR, PVCs, LAD, minimal LVH  EKG 6/13/22 NSR, LAD, LVH, 93 bpm, qtc 455 ms  EKG 5/16/22 SB, incomplete RBBB, LVH, septal infarct, qtc 422 ms         Echo 2/28/22  The left ventricle is normal in size with concentric hypertrophy and normal systolic function.  The estimated ejection fraction is 60%.  Normal left ventricular diastolic function.  Normal right ventricular size with normal right ventricular systolic function.  Mild to moderate tricuspid regurgitation.  Normal central venous pressure (3 mmHg).  The estimated PA systolic  pressure is 36 mmHg.       Echo 2019  CONCLUSIONS     1 - Mild left atrial enlargement.     2 - Concentric hypertrophy.     3 - No wall motion abnormalities.     4 - Normal left ventricular systolic function (EF 60-65%).     5 - Impaired LV relaxation, elevated LAP (grade 2 diastolic dysfunction).     6 - Normal right ventricular systolic function .     7 - The estimated PA systolic pressure is 36 mmHg.     8 - Mild tricuspid regurgitation.        Past Medical History:   Diagnosis Date    Cataract     Bilateral    Chronic diastolic congestive heart failure 2020    Dizziness 2023    Encounter for blood transfusion     History of repair of aneurysm of abdominal aorta using endovascular stent graft     DR Bonds     of psychiatric care     Hypertension     Major depressive disorder, single episode, moderate with anxious distress 2020    Malignant neoplasm of upper-outer quadrant of right breast in female, estrogen receptor positive 2019    radiation    Psychiatric problem     Sleep apnea     Sleep difficulties     Stroke     no residual defect    Therapy        Past Surgical History:   Procedure Laterality Date    APPENDECTOMY      AXILLARY NODE DISSECTION Right 2020    Procedure: LYMPHADENECTOMY, AXILLARY;  Surgeon: Artemio Starks MD;  Location: [facility] OR;  Service: General;  Laterality: Right;    BIOPSY OF AXILLARY NODE Right 2021    Procedure: BIOPSY, LYMPH NODE, AXILLARY;  Surgeon: Artemio Sutton MD;  Location: [address];  Service: General;  Laterality: Right;    BREAST BIOPSY      2019    BREAST LUMPECTOMY Right     2019     SECTION      x 1    OOPHORECTOMY      right     SENTINEL LYMPH NODE BIOPSY Right 2019    Procedure: BIOPSY, LYMPH NODE, SENTINEL;  Surgeon: Artemio Starks MD;  Location: [facility] OR;  Service: General;  Laterality: Right;    splenic artery aneurysm repair      TONSILLECTOMY         Social History     Tobacco Use    Smoking  status: Never     Passive exposure: Past    Smokeless tobacco: Never   Substance Use Topics    Alcohol use: No    Drug use: No       Family History   Problem Relation Age of Onset    Diabetes Maternal Grandmother     Diabetes Maternal Grandfather     Diabetes Mother     Hypertension Mother     Sickle cell anemia Daughter     Breast cancer Neg Hx     Colon cancer Neg Hx     Ovarian cancer Neg Hx          Review of Systems   Constitutional: Negative for decreased appetite, diaphoresis, fever, malaise/fatigue and night sweats.   HENT:  Negative for nosebleeds.    Eyes:  Negative for blurred vision and double vision.   Cardiovascular:  Positive for chest pain and leg swelling. Negative for claudication, irregular heartbeat, near-syncope, orthopnea, palpitations, paroxysmal nocturnal dyspnea and syncope.   Respiratory:  Positive for shortness of breath. Negative for cough, sleep disturbances due to breathing, snoring, sputum production and wheezing.    Endocrine: Negative for cold intolerance and polyuria.   Hematologic/Lymphatic: Does not bruise/bleed easily.   Skin:  Negative for rash.   Musculoskeletal:  Negative for back pain, falls, joint pain, joint swelling and neck pain.        Right chest breast and shoulder pain   Gastrointestinal:  Negative for abdominal pain, heartburn, nausea and vomiting.   Genitourinary:  Negative for dysuria, frequency and hematuria.   Neurological:  Negative for difficulty with concentration, dizziness, focal weakness, headaches, light-headedness, numbness, seizures and weakness.   Psychiatric/Behavioral:  Negative for depression, memory loss and substance abuse. The patient does not have insomnia.    Allergic/Immunologic: Negative for HIV exposure and hives.     There were no vitals filed for this visit.            Objective:   Physical Exam  Constitutional:       Comments: Mild distress.    HENT:      Head: Normocephalic.   Eyes:      Pupils: Pupils are equal, round, and reactive to  light.   Neck:      Thyroid: No thyromegaly.      Vascular: Normal carotid pulses. No carotid bruit or JVD.   Cardiovascular:      Rate and Rhythm: Normal rate and regular rhythm. No extrasystoles are present.     Chest Wall: PMI is not displaced.      Pulses: Normal pulses.      Heart sounds: Normal heart sounds. No murmur heard.     No gallop. No S3 sounds.   Pulmonary:      Effort: No respiratory distress.      Breath sounds: Normal breath sounds. No stridor.   Abdominal:      General: Bowel sounds are normal.      Palpations: Abdomen is soft.      Tenderness: There is no abdominal tenderness. There is no rebound.   Musculoskeletal:         General: Swelling present. Normal range of motion.      Comments: Tender to palpitation   Skin:     Findings: No rash.   Neurological:      Mental Status: She is alert and oriented to person, place, and time.   Psychiatric:         Behavior: Behavior normal.         Lab Results   Component Value Date    CHOL 197 01/18/2024    CHOL 234 (H) 02/28/2023    CHOL 229 (H) 04/18/2022     Lab Results   Component Value Date    HDL 50 01/18/2024    HDL 44 02/28/2023    HDL 45 04/18/2022     Lab Results   Component Value Date    LDLCALC 120.4 01/18/2024    LDLCALC 151.8 02/28/2023    LDLCALC 150.4 04/18/2022     Lab Results   Component Value Date    TRIG 133 01/18/2024    TRIG 191 (H) 02/28/2023    TRIG 168 (H) 04/18/2022     Lab Results   Component Value Date    CHOLHDL 25.4 01/18/2024    CHOLHDL 18.8 (L) 02/28/2023    CHOLHDL 19.7 (L) 04/18/2022       Chemistry        Component Value Date/Time     02/06/2024 1403    K 5.1 02/06/2024 1403     (H) 02/06/2024 1403    CO2 22 (L) 02/06/2024 1403    BUN 20 02/06/2024 1403    CREATININE 1.5 (H) 02/06/2024 1403     (H) 02/06/2024 1403        Component Value Date/Time    CALCIUM 9.5 02/06/2024 1403    ALKPHOS 110 02/06/2024 1403    AST 9 (L) 02/06/2024 1403    ALT 6 (L) 02/06/2024 1403    BILITOT 0.3 02/06/2024 1403     ESTGFRAFRICA 37 (A) 07/06/2022 1312    EGFRNONAA 32 (A) 07/06/2022 1312          Lab Results   Component Value Date    HGBA1C 5.9 (H) 05/18/2019     Lab Results   Component Value Date    TSH 1.691 03/10/2023     Lab Results   Component Value Date    INR 1.1 03/11/2023    INR 0.9 02/15/2022    INR 1.0 11/10/2020     Lab Results   Component Value Date    WBC 8.76 02/06/2024    HGB 11.6 (L) 02/06/2024    HCT 37.2 02/06/2024    MCV 82 02/06/2024     02/06/2024     BMP  Sodium   Date Value Ref Range Status   02/06/2024 139 136 - 145 mmol/L Final     Potassium   Date Value Ref Range Status   02/06/2024 5.1 3.5 - 5.1 mmol/L Final     Chloride   Date Value Ref Range Status   02/06/2024 111 (H) 95 - 110 mmol/L Final     CO2   Date Value Ref Range Status   02/06/2024 22 (L) 23 - 29 mmol/L Final     BUN   Date Value Ref Range Status   02/06/2024 20 8 - 23 mg/dL Final     Creatinine   Date Value Ref Range Status   02/06/2024 1.5 (H) 0.5 - 1.4 mg/dL Final     Calcium   Date Value Ref Range Status   02/06/2024 9.5 8.7 - 10.5 mg/dL Final     Anion Gap   Date Value Ref Range Status   02/06/2024 6 (L) 8 - 16 mmol/L Final     eGFR if    Date Value Ref Range Status   07/06/2022 37 (A) >60 mL/min/1.73 m^2 Final     eGFR if non    Date Value Ref Range Status   07/06/2022 32 (A) >60 mL/min/1.73 m^2 Final     Comment:     Calculation used to obtain the estimated glomerular filtration  rate (eGFR) is the CKD-EPI equation.        BNP  @LABRCNTIP(BNP,BNPTRIAGEBLO)@  @LABRCNTIP(troponini)@  CrCl cannot be calculated (Patient's most recent lab result is older than the maximum 7 days allowed.).  No results found in the last 24 hours.  No results found in the last 24 hours.  No results found in the last 24 hours.    Assessment:      1. Chronic diastolic congestive heart failure    2. Class 3 severe obesity with serious comorbidity and body mass index (BMI) of 40.0 to 44.9 in adult, unspecified obesity type     3. Hypertension, unspecified type    4. NILS (obstructive sleep apnea)    5. Malignant neoplasm of upper-outer quadrant of right breast in female, estrogen receptor positive    6. History of pulmonary embolism    7. Major depressive disorder, recurrent episode, moderate with anxious distress    8. Neuropathic pain    9. Chronic kidney disease, stage 3a    10. Pain of lower extremity, unspecified laterality    11. Abdominal aortic aneurysm (AAA) without rupture, unspecified part    12. Edema, unspecified type    13. History of CVA (cerebrovascular accident)    14. Morbid obesity with BMI of 45.0-49.9, adult    15. Right sided weakness    16. Recurrent pulmonary emboli              Plan:     Left leg swelling/lymphedema- chronic  Restart torsemide 40 mg b.i.d., will add metolazone 5 mg x2 days  Venous ultrasound 10/22 and 9/23 without DVT  [facility]hedema clinic at [facility]- was not covered by insurance  Seeing lymphedema clinic    Diastolic heart failure  Echo 3/23 with normal EF, mild-mod TR    Hypertension  Elevated  Start Coreg 6.25 b.i.d.  Increase hydralazine 100 mg t.i.d.  Continue Entresto  Clonidine prn for BP >170 or >110  Elevated metanephrines- will re-refer if fluctuations continue      Chest pain/shortness of breath-stable  CTA 3/23 did not show PE   Stress test 1/23 normal stress test     Syncope- resolved   14 day monitor 8/22- 7.27% PACs, essentially asymptomatic    History of right breast cancer  Follows with Hematology-Oncology    Right leg pain-stable  DVT ultrasound 10/22 without evidence of DVT   Normal ABIs 3/22  Arterial ultrasound 8/22 without significant stenosis    Recurrent pulmonary embolus   Recurrent PE as of 2/22  Continue OAC    History of CVA  Carotid ultrasound 6/22 no significant stenosis, MRI brain 6/22 no abnormality  Currently not on aspirin as she is taking Eliquis  Continue statin    HLD   as of 2/23  Continue statin    AAA s/p transcutaneous patch  Stable    Morbid obesity, BMI  > 40  Low-salt, low-fat diet  Exercise as tolerated      All labs and cardiac procedures reviewed.      Return to clinic 1-2 months     Arlene Hobbs MD  Cardiology Staff

## 2024-03-20 NOTE — TELEPHONE ENCOUNTER
Returned call to [facility], advised provider is out of office, would need to scan to provider for signature and she scan it back. Once received, will continue with this process to obtain signature, to fax forms to office now.     ----- Message from René Solo sent at 3/20/2024  8:25 AM CDT -----  Name of Who is Calling:Levy gilman/Jak Chavez         What is the request in detail:Levy gilman/Jak Chavez would like a callback from the office in regard to getting a status on prescription (compression garment) that was sent over to the office, needs signature today.Please advise thank you       Can the clinic reply by [facility]:NO         What Number to [facility] Back [facility] [phone]

## 2024-03-22 ENCOUNTER — PATIENT MESSAGE (OUTPATIENT)
Dept: CARDIOLOGY | Facility: CLINIC | Age: 74
End: 2024-03-22
Payer: MEDICARE

## 2024-03-26 ENCOUNTER — PATIENT MESSAGE (OUTPATIENT)
Dept: CARDIOLOGY | Facility: CLINIC | Age: 74
End: 2024-03-26
Payer: MEDICARE

## 2024-03-26 ENCOUNTER — LAB VISIT (OUTPATIENT)
Dept: LAB | Facility: HOSPITAL | Age: 74
End: 2024-03-26
Attending: INTERNAL MEDICINE
Payer: MEDICARE

## 2024-03-26 ENCOUNTER — OFFICE VISIT (OUTPATIENT)
Dept: INTERNAL MEDICINE | Facility: CLINIC | Age: 74
End: 2024-03-26
Payer: MEDICARE

## 2024-03-26 VITALS
SYSTOLIC BLOOD PRESSURE: 152 MMHG | HEIGHT: 72 IN | BODY MASS INDEX: 39.68 KG/M2 | TEMPERATURE: 97 F | HEART RATE: 88 BPM | RESPIRATION RATE: 18 BRPM | WEIGHT: 293 LBS | DIASTOLIC BLOOD PRESSURE: 80 MMHG

## 2024-03-26 DIAGNOSIS — Z00.00 ENCOUNTER FOR PREVENTIVE HEALTH EXAMINATION: Primary | ICD-10-CM

## 2024-03-26 DIAGNOSIS — E61.1 IRON DEFICIENCY: ICD-10-CM

## 2024-03-26 DIAGNOSIS — I10 PRIMARY HYPERTENSION: ICD-10-CM

## 2024-03-26 DIAGNOSIS — G47.33 OSA (OBSTRUCTIVE SLEEP APNEA): Chronic | ICD-10-CM

## 2024-03-26 DIAGNOSIS — I10 HYPERTENSION, UNSPECIFIED TYPE: ICD-10-CM

## 2024-03-26 DIAGNOSIS — R54 AGE-RELATED PHYSICAL DEBILITY: ICD-10-CM

## 2024-03-26 DIAGNOSIS — I89.0 LYMPHEDEMA: ICD-10-CM

## 2024-03-26 DIAGNOSIS — I50.32 CHRONIC DIASTOLIC CONGESTIVE HEART FAILURE: ICD-10-CM

## 2024-03-26 DIAGNOSIS — Z17.0 MALIGNANT NEOPLASM OF UPPER-OUTER QUADRANT OF RIGHT BREAST IN FEMALE, ESTROGEN RECEPTOR POSITIVE: Chronic | ICD-10-CM

## 2024-03-26 DIAGNOSIS — I72.8 SPLENIC ARTERY ANEURYSM: ICD-10-CM

## 2024-03-26 DIAGNOSIS — F33.1 MAJOR DEPRESSIVE DISORDER, RECURRENT EPISODE, MODERATE WITH ANXIOUS DISTRESS: ICD-10-CM

## 2024-03-26 DIAGNOSIS — H43.12 VITREOUS HEMORRHAGE, LEFT EYE: ICD-10-CM

## 2024-03-26 DIAGNOSIS — M79.605 PAIN IN BOTH LOWER EXTREMITIES: ICD-10-CM

## 2024-03-26 DIAGNOSIS — M79.2 NEUROPATHIC PAIN: ICD-10-CM

## 2024-03-26 DIAGNOSIS — M79.604 PAIN IN BOTH LOWER EXTREMITIES: ICD-10-CM

## 2024-03-26 DIAGNOSIS — C50.411 MALIGNANT NEOPLASM OF UPPER-OUTER QUADRANT OF RIGHT BREAST IN FEMALE, ESTROGEN RECEPTOR POSITIVE: Chronic | ICD-10-CM

## 2024-03-26 DIAGNOSIS — I71.41 PARARENAL ABDOMINAL AORTIC ANEURYSM (AAA) WITHOUT RUPTURE: ICD-10-CM

## 2024-03-26 DIAGNOSIS — N18.31 CHRONIC KIDNEY DISEASE, STAGE 3A: ICD-10-CM

## 2024-03-26 DIAGNOSIS — I26.99 RECURRENT PULMONARY EMBOLISM: ICD-10-CM

## 2024-03-26 DIAGNOSIS — G47.34 NOCTURNAL HYPOXEMIA: ICD-10-CM

## 2024-03-26 DIAGNOSIS — E66.01 CLASS 3 SEVERE OBESITY WITH SERIOUS COMORBIDITY AND BODY MASS INDEX (BMI) OF 40.0 TO 44.9 IN ADULT, UNSPECIFIED OBESITY TYPE: Chronic | ICD-10-CM

## 2024-03-26 PROBLEM — I71.40 ABDOMINAL AORTIC ANEURYSM, WITHOUT RUPTURE, UNSPECIFIED: Status: ACTIVE | Noted: 2024-03-26

## 2024-03-26 LAB
ANION GAP SERPL CALC-SCNC: 7 MMOL/L (ref 8–16)
BASOPHILS # BLD AUTO: 0.08 K/UL (ref 0–0.2)
BASOPHILS NFR BLD: 1 % (ref 0–1.9)
BUN SERPL-MCNC: 22 MG/DL (ref 8–23)
CALCIUM SERPL-MCNC: 9.7 MG/DL (ref 8.7–10.5)
CHLORIDE SERPL-SCNC: 110 MMOL/L (ref 95–110)
CO2 SERPL-SCNC: 25 MMOL/L (ref 23–29)
CREAT SERPL-MCNC: 1.3 MG/DL (ref 0.5–1.4)
DIFFERENTIAL METHOD BLD: ABNORMAL
EOSINOPHIL # BLD AUTO: 0.1 K/UL (ref 0–0.5)
EOSINOPHIL NFR BLD: 1.2 % (ref 0–8)
ERYTHROCYTE [DISTWIDTH] IN BLOOD BY AUTOMATED COUNT: 16.7 % (ref 11.5–14.5)
EST. GFR  (NO RACE VARIABLE): 43.7 ML/MIN/1.73 M^2
GLUCOSE SERPL-MCNC: 72 MG/DL (ref 70–110)
HCT VFR BLD AUTO: 40.7 % (ref 37–48.5)
HGB BLD-MCNC: 12.3 G/DL (ref 12–16)
IMM GRANULOCYTES # BLD AUTO: 0.04 K/UL (ref 0–0.04)
IMM GRANULOCYTES NFR BLD AUTO: 0.5 % (ref 0–0.5)
LYMPHOCYTES # BLD AUTO: 2.9 K/UL (ref 1–4.8)
LYMPHOCYTES NFR BLD: 34.6 % (ref 18–48)
MCH RBC QN AUTO: 25.4 PG (ref 27–31)
MCHC RBC AUTO-ENTMCNC: 30.2 G/DL (ref 32–36)
MCV RBC AUTO: 84 FL (ref 82–98)
MONOCYTES # BLD AUTO: 0.8 K/UL (ref 0.3–1)
MONOCYTES NFR BLD: 9.6 % (ref 4–15)
NEUTROPHILS # BLD AUTO: 4.5 K/UL (ref 1.8–7.7)
NEUTROPHILS NFR BLD: 53.1 % (ref 38–73)
NRBC BLD-RTO: 0 /100 WBC
PLATELET # BLD AUTO: 290 K/UL (ref 150–450)
PMV BLD AUTO: 10.8 FL (ref 9.2–12.9)
POTASSIUM SERPL-SCNC: 4.6 MMOL/L (ref 3.5–5.1)
RBC # BLD AUTO: 4.84 M/UL (ref 4–5.4)
SODIUM SERPL-SCNC: 142 MMOL/L (ref 136–145)
WBC # BLD AUTO: 8.4 K/UL (ref 3.9–12.7)

## 2024-03-26 PROCEDURE — 1126F AMNT PAIN NOTED NONE PRSNT: CPT | Mod: HCNC,CPTII,S$GLB, | Performed by: NURSE PRACTITIONER

## 2024-03-26 PROCEDURE — 1159F MED LIST DOCD IN RCRD: CPT | Mod: HCNC,CPTII,S$GLB, | Performed by: NURSE PRACTITIONER

## 2024-03-26 PROCEDURE — 4010F ACE/ARB THERAPY RXD/TAKEN: CPT | Mod: HCNC,CPTII,S$GLB, | Performed by: NURSE PRACTITIONER

## 2024-03-26 PROCEDURE — 85025 COMPLETE CBC W/AUTO DIFF WBC: CPT | Mod: HCNC | Performed by: INTERNAL MEDICINE

## 2024-03-26 PROCEDURE — 3288F FALL RISK ASSESSMENT DOCD: CPT | Mod: HCNC,CPTII,S$GLB, | Performed by: NURSE PRACTITIONER

## 2024-03-26 PROCEDURE — 99999 PR PBB SHADOW E&M-EST. PATIENT-LVL V: CPT | Mod: PBBFAC,HCNC,, | Performed by: NURSE PRACTITIONER

## 2024-03-26 PROCEDURE — 1170F FXNL STATUS ASSESSED: CPT | Mod: HCNC,CPTII,S$GLB, | Performed by: NURSE PRACTITIONER

## 2024-03-26 PROCEDURE — 3077F SYST BP >= 140 MM HG: CPT | Mod: HCNC,CPTII,S$GLB, | Performed by: NURSE PRACTITIONER

## 2024-03-26 PROCEDURE — 1157F ADVNC CARE PLAN IN RCRD: CPT | Mod: HCNC,CPTII,S$GLB, | Performed by: NURSE PRACTITIONER

## 2024-03-26 PROCEDURE — 1160F RVW MEDS BY RX/DR IN RCRD: CPT | Mod: HCNC,CPTII,S$GLB, | Performed by: NURSE PRACTITIONER

## 2024-03-26 PROCEDURE — 3079F DIAST BP 80-89 MM HG: CPT | Mod: HCNC,CPTII,S$GLB, | Performed by: NURSE PRACTITIONER

## 2024-03-26 PROCEDURE — G0439 PPPS, SUBSEQ VISIT: HCPCS | Mod: HCNC,S$GLB,, | Performed by: NURSE PRACTITIONER

## 2024-03-26 PROCEDURE — 1101F PT FALLS ASSESS-DOCD LE1/YR: CPT | Mod: HCNC,CPTII,S$GLB, | Performed by: NURSE PRACTITIONER

## 2024-03-26 PROCEDURE — 80048 BASIC METABOLIC PNL TOTAL CA: CPT | Mod: HCNC | Performed by: INTERNAL MEDICINE

## 2024-03-26 PROCEDURE — 36415 COLL VENOUS BLD VENIPUNCTURE: CPT | Mod: HCNC | Performed by: INTERNAL MEDICINE

## 2024-03-26 NOTE — PROGRESS NOTES
"  Susu Luther presented for a follow-up Medicare AWV today. The following components were reviewed and updated:    Medical history  Family History  Social history  Allergies and Current Medications  Health Risk Assessment  Health Maintenance  Care Team    **See Completed Assessments for Annual Wellness visit with in the encounter summary    The following assessments were completed:  Depression Screening  Cognitive function Screening  Timed Get Up Test  Whisper Test      Opioid documentation:      Patient does not have a current opioid prescription.          Vitals:    24 1428   BP: (!) 152/80   BP Location: Left arm   BP Method: X-Large (Manual)   Pulse: 88   Resp: 18   Temp: 97.2 °F (36.2 °C)   Weight: (!) 147.5 kg (325 lb 2.9 oz)   Height: 6' 1" (1.854 m)     Body mass index is 42.9 kg/m².       Physical Exam  Constitutional:       Appearance: She is obese.   Cardiovascular:      Rate and Rhythm: Normal rate.   Musculoskeletal:      Right lower le+ Edema present.      Left lower le+ Edema present.   Lymphadenopathy:      Comments: BLE lymphedma   Psychiatric:         Attention and Perception: Attention normal.         Mood and Affect: Mood is depressed.         Speech: Speech normal.         Behavior: Behavior normal.         Thought Content: Thought content normal.         Cognition and Memory: Cognition normal.      Comments: Tearful due to pain           Current Outpatient Medications   Medication Instructions    ALPRAZolam (XANAX) 0.5 mg, Oral, 2 times daily PRN    apixaban (ELIQUIS) 5 mg, Oral, 2 times daily    atorvastatin (LIPITOR) 20 mg, Oral, Nightly    butalbital-acetaminophen-caff -40 mg -40 mg Cap 1 tablet, Oral, Every 6 hours PRN    butalbital-acetaminophen-caffeine -40 mg (FIORICET, ESGIC) -40 mg per tablet 2 tablets, Oral, 2 times daily    carvediloL (COREG) 6.25 mg, Oral, 2 times daily with meals    cholecalciferol (vitamin D3) 50,000 Units, Oral, Every 7 " days    cloNIDine (CATAPRES) 0.2 mg, Oral, 2 times daily PRN    diclofenac sodium (VOLTAREN) 1 % Gel Apply 2 grams topically once daily.    gabapentin (NEURONTIN) 600 mg, Oral, 3 times daily    hydrALAZINE (APRESOLINE) 50 mg, Oral, 3 times daily    letrozole (FEMARA) 2.5 mg, Oral, Daily    levocetirizine (XYZAL) 5 mg, Oral, Nightly    LIDOcaine (LIDODERM) 5 % 2 patches, Transdermal, Daily, Remove & Discard patch within 12 hours or as directed by MD    methocarbamoL (ROBAXIN) 500 mg, Oral, 2 times daily PRN    metOLazone (ZAROXOLYN) 5 MG tablet Take 1 tablet for 2 days 30 min before torsemide    metoprolol succinate (TOPROL-XL) 25 mg, Oral, Daily    multivitamin (THERAGRAN) per tablet 1 tablet, Oral, Daily    ranolazine (RANEXA) 1,000 mg, Oral, 2 times daily    sacubitriL-valsartan (ENTRESTO) 24-26 mg per tablet 1 tablet, Oral, 2 times daily    tamsulosin (FLOMAX) 0.4 mg, Oral, Daily    torsemide (DEMADEX) 40 mg, Oral, Daily    venlafaxine (EFFEXOR-XR) 37.5 mg, Oral, Daily    zolpidem (AMBIEN) 5 mg, Oral, Nightly PRN           Diagnoses and health risks identified today and associated recommendations/orders:  1. Encounter for preventive health examination      2. Hypertension, unspecified type  Chronic and Ongoing.  Continue all meds  Schedule to see nephrologist  BMP/CBC today  Also follow up with card md     3. Chronic diastolic congestive heart failure  Chronic and Ongoing. C/O SOB   On Entreso and BP meds  Pt to follow up with Dr. Hobbs    4. NILS (obstructive sleep apnea)  Chronic and Ongoing.  States no longer on CPAP   Instructed to follow up with  pulbrennen hinojosa    5. Malignant neoplasm of upper-outer quadrant of right breast in female, estrogen receptor positive   Right breast CA, Lumpectomy 3/2019  Last mammogram 5/2023  Due for breast u.s and mammogram pt will need to see oncologist first- will set up    6. Major depressive disorder, recurrent episode, moderate with anxious distress  Chronic and Ongoing.    Need  Effexor refill- instructed  to go pharm for refill   Follow up with CP    7. Class 3 severe obesity with serious comorbidity and body mass index (BMI) of 40.0 to 44.9 in adult, unspecified obesity type  BMI 42.9 KG  Discussed and recommend  low fat/carb/chol diet. Cardio exercise as tolerated. Life style modifications.    8. Splenic artery aneurysm  Chronic and Stable. Continue current treatment plan as previously prescribed with your PCP H    9. Neuropathic pain  Chronic and Ongoing on-states gabapentin not working  Scheduled. To see pain management    10. Pain in both lower extremities  Chronic and Ongoing on-states gabapentin not working  Scheduled. To see pain management    11. Nocturnal hypoxemia  Chronic and Ongoing.  States no longer on CPAP   Instructed to follow up with  pulm md    12. Lymphedema  Chronic and Ongoing to legs  Pt currently under the care of [facility]  for leg wraps    13. Iron deficiency  Chronic and Stable cbc. Continue current treatment plan as previously prescribed with your  heme    14. Chronic kidney disease, stage 3a  eatinine 0.5 - 1.4 mg/dL 1.3 1.5 High  1.5 High      Chronic and Stable    CBC and  BMP  Schedule to see neuro Md    15.Recurrent pulmonary embolism   Chronic and Stable on Elquis 2019  Continue current treatment plan as previously prescribed with your hematologist/card      16.Vitreous hemorrhage, left eye   Chronic and Ongoing.  Pt to schedule a follow with opth Md       17  .Pararenal abdominal aortic aneurysm (AAA) without rupture     S/P AAA/ transcutaneous patch by Dr. Vamshi NOWAK SCHEDULE F/U      18. Age-related physical debility   Chronic and Ongoing with rollout and PT  Provided Susu with a 5-10 year written screening schedule and personal prevention plan. Recommendations were developed using the USPSTF age appropriate recommendations. Education, counseling, and referrals were provided as needed.  After Visit Summary printed and given to patient which  includes a list of additional screenings\tests needed.    Follow up in about 1 year (around 3/26/2025).      Tessie Payan NP

## 2024-03-26 NOTE — PATIENT INSTRUCTIONS
Counseling and Referral of Other Preventative  (Italic type indicates deductible and co-insurance are waived)    Patient Name: Susu Luther  Today's Date: 3/26/2024    Health Maintenance       Date Due Completion Date    RSV Vaccine (Age 60+ and Pregnant patients) (1 - 1-dose 60+ series) Never done ---    Shingles Vaccine (2 of 2) 02/15/2024 12/21/2023    Mammogram 05/22/2024 5/22/2023    TETANUS VACCINE 07/03/2024 (Originally 1/2/1968) ---    Lipid Panel 01/18/2025 1/18/2024    Colorectal Cancer Screening 02/21/2025 2/21/2024    DEXA Scan 07/28/2026 7/28/2023        No orders of the defined types were placed in this encounter.    The following information is provided to all patients.  This information is to help you find resources for any of the problems found today that may be affecting your health:                  Living healthy guide: www.[email or web address].[email or web address].[facility]      Understanding Diabetes: www.diabetes.org      Eating healthy: www.cdc.gov/healthyweight      [facility] [place] home safety checklist: www.cdc.gov/steadi/patient.html      Agency on Aging: www.goea.[place].[facility]      Alcoholics anonymous (AA): www.aa.org      Physical Activity: www.romulo.nih.gov/xt5xrny      Tobacco use: www.quitwithusla.org

## 2024-03-28 RX ORDER — TRAMADOL HYDROCHLORIDE 50 MG/1
50 TABLET ORAL EVERY 6 HOURS PRN
Qty: 28 TABLET | Refills: 0 | Status: SHIPPED | OUTPATIENT
Start: 2024-03-28 | End: 2024-04-04

## 2024-04-03 ENCOUNTER — PATIENT MESSAGE (OUTPATIENT)
Dept: PULMONOLOGY | Facility: CLINIC | Age: 74
End: 2024-04-03
Payer: MEDICARE

## 2024-04-03 ENCOUNTER — PATIENT MESSAGE (OUTPATIENT)
Dept: INTERNAL MEDICINE | Facility: CLINIC | Age: 74
End: 2024-04-03
Payer: MEDICARE

## 2024-04-03 ENCOUNTER — OFFICE VISIT (OUTPATIENT)
Dept: NEPHROLOGY | Facility: CLINIC | Age: 74
End: 2024-04-03
Payer: MEDICARE

## 2024-04-03 VITALS
WEIGHT: 293 LBS | DIASTOLIC BLOOD PRESSURE: 90 MMHG | RESPIRATION RATE: 22 BRPM | HEIGHT: 72 IN | HEART RATE: 61 BPM | BODY MASS INDEX: 39.68 KG/M2 | SYSTOLIC BLOOD PRESSURE: 190 MMHG

## 2024-04-03 DIAGNOSIS — N17.9 AKI (ACUTE KIDNEY INJURY): Primary | ICD-10-CM

## 2024-04-03 DIAGNOSIS — I50.32 CHRONIC DIASTOLIC CONGESTIVE HEART FAILURE: ICD-10-CM

## 2024-04-03 DIAGNOSIS — N18.32 CHRONIC KIDNEY DISEASE (CKD) STAGE G3B/A3, MODERATELY DECREASED GLOMERULAR FILTRATION RATE (GFR) BETWEEN 30-44 ML/MIN/1.73 SQUARE METER AND ALBUMINURIA CREATININE RATIO GREATER THAN 300 MG/G: ICD-10-CM

## 2024-04-03 DIAGNOSIS — I10 PRIMARY HYPERTENSION: ICD-10-CM

## 2024-04-03 PROCEDURE — 99999 PR PBB SHADOW E&M-EST. PATIENT-LVL IV: CPT | Mod: PBBFAC,HCNC,, | Performed by: INTERNAL MEDICINE

## 2024-04-03 PROCEDURE — 3080F DIAST BP >= 90 MM HG: CPT | Mod: HCNC,CPTII,S$GLB, | Performed by: INTERNAL MEDICINE

## 2024-04-03 PROCEDURE — 3288F FALL RISK ASSESSMENT DOCD: CPT | Mod: HCNC,CPTII,S$GLB, | Performed by: INTERNAL MEDICINE

## 2024-04-03 PROCEDURE — 3066F NEPHROPATHY DOC TX: CPT | Mod: HCNC,CPTII,S$GLB, | Performed by: INTERNAL MEDICINE

## 2024-04-03 PROCEDURE — 1159F MED LIST DOCD IN RCRD: CPT | Mod: HCNC,CPTII,S$GLB, | Performed by: INTERNAL MEDICINE

## 2024-04-03 PROCEDURE — 3077F SYST BP >= 140 MM HG: CPT | Mod: HCNC,CPTII,S$GLB, | Performed by: INTERNAL MEDICINE

## 2024-04-03 PROCEDURE — 3008F BODY MASS INDEX DOCD: CPT | Mod: HCNC,CPTII,S$GLB, | Performed by: INTERNAL MEDICINE

## 2024-04-03 PROCEDURE — 1157F ADVNC CARE PLAN IN RCRD: CPT | Mod: HCNC,CPTII,S$GLB, | Performed by: INTERNAL MEDICINE

## 2024-04-03 PROCEDURE — 4010F ACE/ARB THERAPY RXD/TAKEN: CPT | Mod: HCNC,CPTII,S$GLB, | Performed by: INTERNAL MEDICINE

## 2024-04-03 PROCEDURE — 1101F PT FALLS ASSESS-DOCD LE1/YR: CPT | Mod: HCNC,CPTII,S$GLB, | Performed by: INTERNAL MEDICINE

## 2024-04-03 PROCEDURE — 1125F AMNT PAIN NOTED PAIN PRSNT: CPT | Mod: HCNC,CPTII,S$GLB, | Performed by: INTERNAL MEDICINE

## 2024-04-03 PROCEDURE — 99205 OFFICE O/P NEW HI 60 MIN: CPT | Mod: HCNC,S$GLB,, | Performed by: INTERNAL MEDICINE

## 2024-04-03 RX ORDER — HYDRALAZINE HYDROCHLORIDE 100 MG/1
100 TABLET, FILM COATED ORAL 3 TIMES DAILY
Qty: 270 TABLET | Refills: 3 | Status: SHIPPED | OUTPATIENT
Start: 2024-04-03

## 2024-04-03 RX ORDER — VALSARTAN 80 MG/1
80 TABLET ORAL DAILY
Qty: 90 TABLET | Refills: 3 | Status: SHIPPED | OUTPATIENT
Start: 2024-04-03 | End: 2024-05-22 | Stop reason: SDUPTHER

## 2024-04-04 RX ORDER — CLONIDINE HYDROCHLORIDE 0.1 MG/1
0.2 TABLET ORAL 2 TIMES DAILY PRN
Qty: 60 TABLET | Refills: 11 | OUTPATIENT
Start: 2024-04-04

## 2024-04-04 NOTE — PROGRESS NOTES
Susu Luther is a 74 y.o. female for whom nephrology consult has been requested to evaluate and give opinion.   Renal clinic consult note:  Date of consult: 4/3/24  Reason for consult: TANNER  Referring physician: Dr. Rubi Littlejohn    HPI: Thank you for referring the pt to us. H/o and chart were reviewed. Pt was seen and examined. Pt is a 73 y/o female with h/o of HTN and CHF who was referred to renal clinic for s Cr elevation in the past 8 months. Noted baseline s cr is close to 1.3. Noted that s Cr worsened to max 3.4 eight months ago. S Cr then gradually improved to 1.3 this visit. Pt describes having diarrhea almost daily. Diet was reviewed. Pt's diet is very rich in sweets and artificial sweeteners. Pt does have CHF, but denies SOB or worsened leg swelling.    Chart further reviewed. Noted pt has a h/o of uncontrolled BP, and had some w/u done showing slightly elevated free metanephrine.    PAST MEDICAL HISTORY:  CKD stage 3, HTN, Chronic diastolic congestive heart failure (2020), Dizziness (2023), History of repair of aneurysm of abdominal aorta using endovascular stent graft, Major depressive disorder, single episode, moderate with anxious distress (2020), Malignant neoplasm of upper-outer quadrant of right breast in female, estrogen receptor positive (2019), Psychiatric problem, Sleep apnea, Sleep difficulties, Stroke (), and Therapy.    PAST SURGICAL HISTORY:  She  has a past surgical history that includes Oophorectomy;  section; Appendectomy; [place] lymph node biopsy (Right, 2019); Breast lumpectomy (Right); Breast biopsy; Axillary node dissection (Right, 2020); Biopsy of axillary node (Right, 2021); Tonsillectomy; and splenic artery aneurysm repair.    SOCIAL HISTORY:  She  reports that she has never smoked. She has been exposed to tobacco smoke. She has never used smokeless tobacco. She reports that she does not drink alcohol and does not use  drugs.    FAMILY MEDICAL HISTORY:  Her family history includes Diabetes in her maternal grandfather, maternal grandmother, and mother; Hypertension in her mother; Sickle cell anemia in her daughter.    Review of patient's allergies indicates:   Allergen Reactions    Pcn [penicillins] Hives           Prior to Admission medications    Medication Sig Start Date End Date Taking? Authorizing Provider   ALPRAZolam (XANAX) 0.5 MG tablet Take 1 tablet (0.5 mg total) by mouth 2 (two) times daily as needed for Anxiety. 3/18/24 4/17/24 Yes Rubi Littlejohn MD   apixaban (ELIQUIS) 5 mg Tab Take 1 tablet (5 mg total) by mouth 2 (two) times daily. 2/26/24  Yes Rubi Littlejohn MD   atorvastatin (LIPITOR) 20 MG tablet Take 1 tablet (20 mg total) by mouth every evening. 1/30/24  Yes Rubi Littlejohn MD   butalbital-acetaminophen-caffeine -40 mg (FIORICET, ESGIC) -40 mg per tablet Take 2 tablets by mouth 2 (two) times daily. 2/1/24  Yes Provider, Historical   carvediloL (COREG) 6.25 MG tablet Take 1 tablet (6.25 mg total) by mouth 2 (two) times daily with meals. 3/20/24 3/20/25 Yes Arlene Hobbs MD   cholecalciferol, vitamin D3, 1,250 mcg (50,000 unit) capsule Take 1 capsule (50,000 Units total) by mouth every 7 days. 1/30/24  Yes Ruib Littlejohn MD   cloNIDine (CATAPRES) 0.1 MG tablet Take 2 tablets (0.2 mg total) by mouth 2 (two) times daily as needed (blood pressure greater than 170/90). 2/6/24  Yes Rubi Littlejohn MD   diclofenac sodium (VOLTAREN) 1 % Gel Apply 2 grams topically once daily. 2/23/24  Yes Rubi Littlejohn MD   gabapentin (NEURONTIN) 300 MG capsule Take 2 capsules (600 mg total) by mouth 3 (three) times daily. 12/21/23 12/20/24 Yes Rubi Littlejohn MD   letrozole (FEMARA) 2.5 mg Tab Take 1 tablet (2.5 mg total) by mouth once daily. 1/30/24  Yes Rubi Littlejohn MD   levocetirizine (XYZAL) 5 MG tablet Take 1 tablet (5 mg total) by mouth every evening. 1/30/24  Yes  Rubi Littlejohn MD   LIDOcaine (LIDODERM) 5 % Place 2 patches onto the skin once daily. Remove & Discard patch within 12 hours or as directed by MD 10/24/23  Yes Rubi Littlejohn MD   methocarbamoL (ROBAXIN) 500 MG Tab Take 1 tablet (500 mg total) by mouth 2 (two) times daily as needed. 3/7/24 6/5/24 Yes Karissa Tao, NP   multivitamin (THERAGRAN) per tablet Take 1 tablet by mouth once daily.   Yes Provider, Historical   ranolazine (RANEXA) 1,000 mg Tb12 Take 1 tablet (1,000 mg total) by mouth 2 (two) times daily. 10/24/23 10/23/24 Yes Rubi Littlejohn MD   sacubitriL-valsartan (ENTRESTO) 24-26 mg per tablet Take 1 tablet by mouth 2 (two) times daily. 2/27/24  Yes Rubi Littlejohn MD   tamsulosin (FLOMAX) 0.4 mg Cap Take 1 capsule (0.4 mg total) by mouth once daily. 11/8/23 4/3/24 Yes Alexus Casas NP   traMADoL (ULTRAM) 50 mg tablet Take 1 tablet (50 mg total) by mouth every 6 (six) hours as needed for Pain. 3/28/24 4/4/24 Yes Barry Edwards MD   venlafaxine (EFFEXOR-XR) 37.5 MG 24 hr capsule Take 1 capsule (37.5 mg total) by mouth once daily. 11/7/23 11/6/24 Yes Rubi Littlejohn MD   zolpidem (AMBIEN) 5 MG Tab Take 1 tablet (5 mg total) by mouth nightly as needed. 1/30/24  Yes Rubi Littlejohn MD   hydrALAZINE (APRESOLINE) 50 MG tablet Take 1 tablet (50 mg total) by mouth 3 (three) times daily. 2/6/24 4/3/24 Yes Rubi Littlejohn MD   metoprolol succinate (TOPROL-XL) 25 MG 24 hr tablet Take 1 tablet (25 mg total) by mouth once daily. 3/8/24 4/3/24 Yes Rubi Littlejohn MD   butalbital-acetaminophen-caff -40 mg -40 mg Cap Take 1 tablet by mouth every 6 (six) hours as needed (Headache).  Patient not taking: Reported on 4/3/2024 1/30/24   Rubi Littlejohn MD   hydrALAZINE (APRESOLINE) 100 MG tablet Take 1 tablet (100 mg total) by mouth 3 (three) times daily. 4/3/24   Constantine Worthy MD   metOLazone (ZAROXOLYN) 5 MG tablet Take 1 tablet for 2 days 30 min  "before torsemide  Patient not taking: Reported on 4/3/2024 3/20/24   Arlene Hobbs MD   torsemide (DEMADEX) 20 MG Tab Take 2 tablets (40 mg total) by mouth once daily. 1/30/24   Rubi Littlejohn MD                REVIEW OF SYSTEMS:  Patient has no fever, fatigue, visual changes, chest pain, edema, cough, dyspnea, nausea, vomiting, constipation, diarrhea, arthralgias, pruritis, dizziness, weakness, depression, confusion.    PHYSICAL EXAM:   height is 6' 1" (1.854 m) and weight is 147.9 kg (326 lb 1 oz) (abnormal). Her blood pressure is 190/90 (abnormal) and her pulse is 61. Her respiration is 22 (abnormal).   Repeat /80  Gen: WDWN female in no apparent distress  Psych: Normal mood and affect  Skin: No rashes or ulcers  Neck: No JVD  Chest: Clear with no rales, rhonchi, wheezing with normal effort  CV: Regular with no murmurs, gallops or rubs  Abd: Soft, nontender, no distension  Ext: No edema    Labs reviewed  BMP  Lab Results   Component Value Date     03/26/2024    K 4.6 03/26/2024     03/26/2024    CO2 25 03/26/2024    BUN 22 03/26/2024    CREATININE 1.3 03/26/2024    CALCIUM 9.7 03/26/2024    ANIONGAP 7 (L) 03/26/2024    EGFRNORACEVR 43.7 (A) 03/26/2024     Lab Results   Component Value Date    WBC 8.40 03/26/2024    HGB 12.3 03/26/2024    HCT 40.7 03/26/2024    MCV 84 03/26/2024     03/26/2024     FOBT neg    Past labs reviewed  Free metanephrine 73  Total metanephrine 788      IMPRESSION AND RECOMMENDATIONS: 75 y/o female with h/o of HTN presented for evaluation of TANNER:    Renal: TANNER appears to have resolved.  TANNER on CKD stage 3  Suspect TANNER due to extensive and chronic diarrhea  K normal  Na normal    2. GI: diarrhea: appears chronic.  FOBT neg  Uses a lot of sweets and artificial sweeteners in diet. Osmotic diarrhea?  Noted elevated metanephrine. Does pt have pheochromocytoma? That could be associated with diarrhea? Via serotonin release?  Recommend refer to GI and " endocrine    3. HTN: BP uncontrolled  Meds reviewed  Will add valsartan (noted also on entresto) 80 mg po qd, may combine with entresto  Slightly elevated free metanephrine noted in March 2023  Recommend repeat labs, r/o secondary HTN, CT abd and adrenal glands    Plans and recommendation:  As discussed above  Total time spent 60 minutes including time needed to review the records, the   patient evaluation, documentation, face-to-face discussion with the patient,   more than 50% of the time was spent on coordination of care and counseling.    Level V visit.  RTC 1-2 months    Constantine Worthy MD

## 2024-04-06 NOTE — PROGRESS NOTES
"Subjective:      Patient ID: Susu Luther is a 74 y.o. female.    Chief Complaint:  Headaches.       History of Present Illness  HPI 74 years old C. Female with PMHx of HTN / NILS / Obesity / Neuropathic pain / Headaches and others medical issues came for the evaluation and recommendation of    Headaches.    Started:  about a year or so.   Describes:  pressure  Timin to 12 hours.   Frequency: 3 to 4 times per week.   Pain:  4 to 7/ 10.   Location: Neck / front of the head.   Family: None.   Medications: NSAID's PRN / Fioricet's.   Worsen: physical activities.   Alleviated: relaxation /   Associated symptoms: dizziness ( lightheaded / spinning ) /   Triggers: none.   Prodrome symptoms:           Referred by PCP.        HTN has been a problem.        Dizziness - lasting few seconds ( 30 to 40 ).       No relation between HA"s and dizziness.       Denied Nausea / phonophobia / photophobia.       No ED visit for the headaches.          Review of Systems  Review of Systems   Allergic/Immunologic:        PCN.   Neurological:  Positive for headaches.   All other systems reviewed and are negative.    Objective:     Neurologic Exam     Mental Status   Oriented to person, place, and time.   Registration: recalls 3 of 3 objects. Recall at 5 minutes: recalls 3 of 3 objects. Follows 3 step commands.   Attention: normal. Concentration: normal.   Speech: speech is normal   Level of consciousness: alert  Knowledge: good. Able to perform simple calculations.   Able to name object. Able to read. Able to repeat. Able to write. Normal comprehension.     Cranial Nerves     CN II   Visual fields full to confrontation.     CN III, IV, VI   Pupils are equal, round, and reactive to light.  Extraocular motions are normal.   Upgaze: normal  Downgaze: normal  Conjugate gaze: present  Vestibulo-ocular reflex: present    CN V   Facial sensation intact.     CN VII   Facial expression full, symmetric.     CN VIII   CN VIII normal.     CN " IX, X   CN IX normal.   CN X normal.     CN XI   CN XI normal.     CN XII   CN XII normal.     Motor Exam   Muscle bulk: normal  Overall muscle tone: normal    Strength   Strength 5/5 throughout.   Right neck flexion: 5/5  Left neck flexion: 5/5  Right neck extension: 5/5  Left neck extension: 5/5  Right deltoid: 5/5  Left deltoid: 5/5  Right biceps: 5/5  Left biceps: 5/5  Right triceps: 5/5  Left triceps: 5/5  Right wrist flexion: 5/5  Left wrist flexion: 5/5  Right wrist extension: 5/5  Left wrist extension: 5/5  Right interossei: 5/5  Left interossei: 5/5  Right abdominals: 5/5  Left abdominals: 5/5  Right iliopsoas: 5/5  Left iliopsoas: 5/5  Right quadriceps: 5/5  Left quadriceps: 5/5  Right hamstrin/5  Left hamstrin/5  Right glutei: 5/5  Left glutei: 5/5  Right anterior tibial: 5/5  Left anterior tibial: 5/5  Right posterior tibial: 5/5  Left posterior tibial: 5/5  Right peroneal: 5/5  Left peroneal: 5/5  Right gastroc: 5/5  Left gastroc: 5/5    Sensory Exam   Light touch normal.   Vibration normal.   Proprioception normal.   Pinprick normal.     Gait, Coordination, and Reflexes     Gait  Gait: normal    Coordination   Romberg: negative  Finger to nose coordination: normal  Heel to shin coordination: normal  Tandem walking coordination: normal    Tremor   Resting tremor: absent  Intention tremor: absent  Action tremor: absent    Reflexes   Right brachioradialis: 2+  Left brachioradialis: 2+  Right biceps: 2+  Left biceps: 2+  Right triceps: 2+  Left triceps: 2+  Right patellar: 2+  Left patellar: 2+  Right achilles: 2+  Left achilles: 2+  Right : 2+  Left : 2+  Right plantar: normal  Left plantar: normal  Right Peralta: absent  Left Peralta: absent  Right ankle clonus: absent  Left ankle clonus: absent  Right pendular knee jerk: absent  Left pendular knee jerk: absent      Physical Exam  Vitals and nursing note reviewed.   Constitutional:       Appearance: Normal appearance. She is obese.    HENT:      Head: Normocephalic and atraumatic.      Mouth/Throat:      Mouth: Mucous membranes are dry.      Pharynx: Oropharynx is clear.   Eyes:      Extraocular Movements: Extraocular movements intact and EOM normal.      Conjunctiva/sclera: Conjunctivae normal.      Pupils: Pupils are equal, round, and reactive to light.   Cardiovascular:      Rate and Rhythm: Normal rate and regular rhythm.      Pulses: Normal pulses.      Heart sounds: Normal heart sounds.   Pulmonary:      Effort: Pulmonary effort is normal.      Breath sounds: Normal breath sounds.   Abdominal:      General: Abdomen is flat. Bowel sounds are normal.      Palpations: Abdomen is soft.   Genitourinary:     Comments: Deferred.   Musculoskeletal:         General: Normal range of motion.      Cervical back: Normal range of motion and neck supple.   Skin:     General: Skin is warm and dry.      Capillary Refill: Capillary refill takes less than 2 seconds.   Neurological:      Mental Status: She is alert and oriented to person, place, and time. Mental status is at baseline.      Motor: Motor strength is normal.     Coordination: Finger-Nose-Finger Test, Heel to Shin Test and Romberg Test normal.      Gait: Gait is intact. Tandem walk normal.      Deep Tendon Reflexes:      Reflex Scores:       Tricep reflexes are 2+ on the right side and 2+ on the left side.       Bicep reflexes are 2+ on the right side and 2+ on the left side.       Brachioradialis reflexes are 2+ on the right side and 2+ on the left side.       Patellar reflexes are 2+ on the right side and 2+ on the left side.       Achilles reflexes are 2+ on the right side and 2+ on the left side.  Psychiatric:         Mood and Affect: Mood normal.         Speech: Speech normal.         Behavior: Behavior normal.         Thought Content: Thought content normal.         Judgment: Judgment normal.          Assessment:   74 Years old C. Female with PMHX as above came of the evaluation of Chronic  Headaches.   - Chronic Tensio Type Headaches.   - NILS.   - Obese.   - HTN.   - Non Compliance.   Plan:   Patient Neurological Assessment is non focal.   She does not know why She is taking the Gabapentin - wean to BID x a week then 1 per a week then off.   No Hx of Kidney stones.   Topamax 25 mg PO BID.  Discussed weight loss.   Eyes clinic visit - last year / no swelling according to Patient.  Tylenol 650 mg Po PRN.     Non compliance with CPaP - discussed it / she will set up appointment to go back sleep clinic. .    Labs: CBC / CMP / Lipids panel - done 2024 - non significant abnormalities.    Personally reviewed MRI Lumbar spine - done 02/ 2024 - multi level DGD's changes.     Personally reviewed CT Scan Head - done 12/ 2023 - no acute changes.     Please do not hesitate to contact me with any updates, questions or concerns.    Gwyn Liu MD.  General Neurologist.

## 2024-04-08 ENCOUNTER — OFFICE VISIT (OUTPATIENT)
Dept: NEUROLOGY | Facility: CLINIC | Age: 74
End: 2024-04-08
Payer: MEDICARE

## 2024-04-08 VITALS
DIASTOLIC BLOOD PRESSURE: 81 MMHG | SYSTOLIC BLOOD PRESSURE: 124 MMHG | WEIGHT: 293 LBS | BODY MASS INDEX: 39.68 KG/M2 | RESPIRATION RATE: 16 BRPM | OXYGEN SATURATION: 99 % | HEART RATE: 55 BPM | HEIGHT: 72 IN

## 2024-04-08 DIAGNOSIS — G44.229 CHRONIC TENSION-TYPE HEADACHE, NOT INTRACTABLE: Primary | ICD-10-CM

## 2024-04-08 PROCEDURE — 99204 OFFICE O/P NEW MOD 45 MIN: CPT | Mod: HCNC,S$GLB,, | Performed by: PSYCHIATRY & NEUROLOGY

## 2024-04-08 PROCEDURE — 1125F AMNT PAIN NOTED PAIN PRSNT: CPT | Mod: HCNC,CPTII,S$GLB, | Performed by: PSYCHIATRY & NEUROLOGY

## 2024-04-08 PROCEDURE — 1157F ADVNC CARE PLAN IN RCRD: CPT | Mod: HCNC,CPTII,S$GLB, | Performed by: PSYCHIATRY & NEUROLOGY

## 2024-04-08 PROCEDURE — 3288F FALL RISK ASSESSMENT DOCD: CPT | Mod: HCNC,CPTII,S$GLB, | Performed by: PSYCHIATRY & NEUROLOGY

## 2024-04-08 PROCEDURE — 3074F SYST BP LT 130 MM HG: CPT | Mod: HCNC,CPTII,S$GLB, | Performed by: PSYCHIATRY & NEUROLOGY

## 2024-04-08 PROCEDURE — 4010F ACE/ARB THERAPY RXD/TAKEN: CPT | Mod: HCNC,CPTII,S$GLB, | Performed by: PSYCHIATRY & NEUROLOGY

## 2024-04-08 PROCEDURE — 1160F RVW MEDS BY RX/DR IN RCRD: CPT | Mod: HCNC,CPTII,S$GLB, | Performed by: PSYCHIATRY & NEUROLOGY

## 2024-04-08 PROCEDURE — 3008F BODY MASS INDEX DOCD: CPT | Mod: HCNC,CPTII,S$GLB, | Performed by: PSYCHIATRY & NEUROLOGY

## 2024-04-08 PROCEDURE — 1100F PTFALLS ASSESS-DOCD GE2>/YR: CPT | Mod: HCNC,CPTII,S$GLB, | Performed by: PSYCHIATRY & NEUROLOGY

## 2024-04-08 PROCEDURE — 3066F NEPHROPATHY DOC TX: CPT | Mod: HCNC,CPTII,S$GLB, | Performed by: PSYCHIATRY & NEUROLOGY

## 2024-04-08 PROCEDURE — 99999 PR PBB SHADOW E&M-EST. PATIENT-LVL V: CPT | Mod: PBBFAC,HCNC,, | Performed by: PSYCHIATRY & NEUROLOGY

## 2024-04-08 PROCEDURE — 1159F MED LIST DOCD IN RCRD: CPT | Mod: HCNC,CPTII,S$GLB, | Performed by: PSYCHIATRY & NEUROLOGY

## 2024-04-08 PROCEDURE — 3079F DIAST BP 80-89 MM HG: CPT | Mod: HCNC,CPTII,S$GLB, | Performed by: PSYCHIATRY & NEUROLOGY

## 2024-04-08 RX ORDER — TOPIRAMATE 25 MG/1
25 TABLET ORAL 2 TIMES DAILY
Qty: 60 TABLET | Refills: 1 | Status: SHIPPED | OUTPATIENT
Start: 2024-04-08 | End: 2024-05-28 | Stop reason: SINTOL

## 2024-04-08 RX ORDER — DEXTROMETHORPHAN HYDROBROMIDE, GUAIFENESIN 5; 100 MG/5ML; MG/5ML
650 LIQUID ORAL EVERY 12 HOURS PRN
Qty: 20 TABLET | Refills: 1 | Status: SHIPPED | OUTPATIENT
Start: 2024-04-08 | End: 2024-06-07 | Stop reason: CLARIF

## 2024-04-14 DIAGNOSIS — F54 PSYCHOLOGICAL FACTORS AFFECTING MEDICAL CONDITION: ICD-10-CM

## 2024-04-15 ENCOUNTER — LAB VISIT (OUTPATIENT)
Dept: LAB | Facility: HOSPITAL | Age: 74
End: 2024-04-15
Attending: NURSE PRACTITIONER
Payer: MEDICARE

## 2024-04-15 DIAGNOSIS — Z17.0 MALIGNANT NEOPLASM OF UPPER-OUTER QUADRANT OF RIGHT BREAST IN FEMALE, ESTROGEN RECEPTOR POSITIVE: ICD-10-CM

## 2024-04-15 DIAGNOSIS — I26.99 ACUTE PULMONARY EMBOLISM, UNSPECIFIED PULMONARY EMBOLISM TYPE, UNSPECIFIED WHETHER ACUTE COR PULMONALE PRESENT: ICD-10-CM

## 2024-04-15 DIAGNOSIS — C50.411 MALIGNANT NEOPLASM OF UPPER-OUTER QUADRANT OF RIGHT BREAST IN FEMALE, ESTROGEN RECEPTOR POSITIVE: ICD-10-CM

## 2024-04-15 LAB
BASOPHILS # BLD AUTO: 0.08 K/UL (ref 0–0.2)
BASOPHILS NFR BLD: 1.1 % (ref 0–1.9)
DIFFERENTIAL METHOD BLD: ABNORMAL
EOSINOPHIL # BLD AUTO: 0.1 K/UL (ref 0–0.5)
EOSINOPHIL NFR BLD: 0.8 % (ref 0–8)
ERYTHROCYTE [DISTWIDTH] IN BLOOD BY AUTOMATED COUNT: 17.3 % (ref 11.5–14.5)
HCT VFR BLD AUTO: 43.9 % (ref 37–48.5)
HGB BLD-MCNC: 13.4 G/DL (ref 12–16)
IMM GRANULOCYTES # BLD AUTO: 0.01 K/UL (ref 0–0.04)
IMM GRANULOCYTES NFR BLD AUTO: 0.1 % (ref 0–0.5)
LYMPHOCYTES # BLD AUTO: 2.5 K/UL (ref 1–4.8)
LYMPHOCYTES NFR BLD: 35.8 % (ref 18–48)
MCH RBC QN AUTO: 25.5 PG (ref 27–31)
MCHC RBC AUTO-ENTMCNC: 30.5 G/DL (ref 32–36)
MCV RBC AUTO: 84 FL (ref 82–98)
MONOCYTES # BLD AUTO: 0.5 K/UL (ref 0.3–1)
MONOCYTES NFR BLD: 7.3 % (ref 4–15)
NEUTROPHILS # BLD AUTO: 3.9 K/UL (ref 1.8–7.7)
NEUTROPHILS NFR BLD: 54.9 % (ref 38–73)
NRBC BLD-RTO: 0 /100 WBC
PLATELET # BLD AUTO: 281 K/UL (ref 150–450)
PMV BLD AUTO: 11.3 FL (ref 9.2–12.9)
RBC # BLD AUTO: 5.25 M/UL (ref 4–5.4)
WBC # BLD AUTO: 7.1 K/UL (ref 3.9–12.7)

## 2024-04-15 PROCEDURE — 80053 COMPREHEN METABOLIC PANEL: CPT | Mod: HCNC | Performed by: NURSE PRACTITIONER

## 2024-04-15 PROCEDURE — 85025 COMPLETE CBC W/AUTO DIFF WBC: CPT | Mod: HCNC | Performed by: NURSE PRACTITIONER

## 2024-04-15 PROCEDURE — 36415 COLL VENOUS BLD VENIPUNCTURE: CPT | Mod: HCNC,PN | Performed by: NURSE PRACTITIONER

## 2024-04-15 PROCEDURE — 83540 ASSAY OF IRON: CPT | Mod: HCNC | Performed by: NURSE PRACTITIONER

## 2024-04-15 PROCEDURE — 82728 ASSAY OF FERRITIN: CPT | Mod: HCNC | Performed by: NURSE PRACTITIONER

## 2024-04-15 RX ORDER — ALPRAZOLAM 0.5 MG/1
0.5 TABLET ORAL 2 TIMES DAILY PRN
Qty: 60 TABLET | Refills: 0 | Status: SHIPPED | OUTPATIENT
Start: 2024-04-15 | End: 2024-05-17 | Stop reason: SDUPTHER

## 2024-04-16 LAB
ALBUMIN SERPL BCP-MCNC: 3.5 G/DL (ref 3.5–5.2)
ALP SERPL-CCNC: 138 U/L (ref 55–135)
ALT SERPL W/O P-5'-P-CCNC: 9 U/L (ref 10–44)
ANION GAP SERPL CALC-SCNC: 10 MMOL/L (ref 8–16)
AST SERPL-CCNC: 10 U/L (ref 10–40)
BILIRUB SERPL-MCNC: 0.3 MG/DL (ref 0.1–1)
BUN SERPL-MCNC: 20 MG/DL (ref 8–23)
CALCIUM SERPL-MCNC: 9.7 MG/DL (ref 8.7–10.5)
CHLORIDE SERPL-SCNC: 112 MMOL/L (ref 95–110)
CO2 SERPL-SCNC: 19 MMOL/L (ref 23–29)
CREAT SERPL-MCNC: 1.2 MG/DL (ref 0.5–1.4)
EST. GFR  (NO RACE VARIABLE): 48.1 ML/MIN/1.73 M^2
FERRITIN SERPL-MCNC: 125 NG/ML (ref 20–300)
GLUCOSE SERPL-MCNC: 114 MG/DL (ref 70–110)
IRON SERPL-MCNC: 80 UG/DL (ref 30–160)
POTASSIUM SERPL-SCNC: 4.1 MMOL/L (ref 3.5–5.1)
PROT SERPL-MCNC: 6.9 G/DL (ref 6–8.4)
SATURATED IRON: 28 % (ref 20–50)
SODIUM SERPL-SCNC: 141 MMOL/L (ref 136–145)
TOTAL IRON BINDING CAPACITY: 281 UG/DL (ref 250–450)
TRANSFERRIN SERPL-MCNC: 190 MG/DL (ref 200–375)

## 2024-04-17 ENCOUNTER — OFFICE VISIT (OUTPATIENT)
Dept: HEMATOLOGY/ONCOLOGY | Facility: CLINIC | Age: 74
End: 2024-04-17
Payer: MEDICARE

## 2024-04-17 ENCOUNTER — TELEPHONE (OUTPATIENT)
Dept: INTERNAL MEDICINE | Facility: CLINIC | Age: 74
End: 2024-04-17
Payer: MEDICARE

## 2024-04-17 DIAGNOSIS — C50.411 MALIGNANT NEOPLASM OF UPPER-OUTER QUADRANT OF RIGHT BREAST IN FEMALE, ESTROGEN RECEPTOR POSITIVE: Primary | ICD-10-CM

## 2024-04-17 DIAGNOSIS — E61.1 IRON DEFICIENCY: ICD-10-CM

## 2024-04-17 DIAGNOSIS — Z17.0 MALIGNANT NEOPLASM OF UPPER-OUTER QUADRANT OF RIGHT BREAST IN FEMALE, ESTROGEN RECEPTOR POSITIVE: Primary | ICD-10-CM

## 2024-04-17 DIAGNOSIS — I26.99 ACUTE PULMONARY EMBOLISM, UNSPECIFIED PULMONARY EMBOLISM TYPE, UNSPECIFIED WHETHER ACUTE COR PULMONALE PRESENT: ICD-10-CM

## 2024-04-17 PROCEDURE — 1101F PT FALLS ASSESS-DOCD LE1/YR: CPT | Mod: HCNC,CPTII,95, | Performed by: NURSE PRACTITIONER

## 2024-04-17 PROCEDURE — 3066F NEPHROPATHY DOC TX: CPT | Mod: HCNC,CPTII,95, | Performed by: NURSE PRACTITIONER

## 2024-04-17 PROCEDURE — 1160F RVW MEDS BY RX/DR IN RCRD: CPT | Mod: HCNC,CPTII,95, | Performed by: NURSE PRACTITIONER

## 2024-04-17 PROCEDURE — 3288F FALL RISK ASSESSMENT DOCD: CPT | Mod: HCNC,CPTII,95, | Performed by: NURSE PRACTITIONER

## 2024-04-17 PROCEDURE — 99215 OFFICE O/P EST HI 40 MIN: CPT | Mod: HCNC,95,, | Performed by: NURSE PRACTITIONER

## 2024-04-17 PROCEDURE — 1157F ADVNC CARE PLAN IN RCRD: CPT | Mod: HCNC,CPTII,95, | Performed by: NURSE PRACTITIONER

## 2024-04-17 PROCEDURE — 4010F ACE/ARB THERAPY RXD/TAKEN: CPT | Mod: HCNC,CPTII,95, | Performed by: NURSE PRACTITIONER

## 2024-04-17 PROCEDURE — 1159F MED LIST DOCD IN RCRD: CPT | Mod: HCNC,CPTII,95, | Performed by: NURSE PRACTITIONER

## 2024-04-17 PROCEDURE — 1125F AMNT PAIN NOTED PAIN PRSNT: CPT | Mod: HCNC,CPTII,95, | Performed by: NURSE PRACTITIONER

## 2024-04-17 NOTE — PROGRESS NOTES
Subjective:       Patient ID: Susu Luther is a 74 y.o. female.    Chief Complaint:   1. Malignant neoplasm of upper-outer quadrant of right breast in female, estrogen receptor positive  Stage IIA (pT2, pN0(sn), cM0, G2, ER+, HI-, HER2-) invasive lobular carcinoma of right breast      2. Acute pulmonary embolism, unspecified pulmonary embolism type, unspecified whether acute cor pulmonale present          Current Treatment:  Femara 2.5mg po daily      Eliquis 5mg po BID    Treatment History:  S/p lumpectomy & sentinel LN biopsy, right, Dr. Starks, 2/28/2019        XRT from 5/20/2019 to 6/18/2019    HPI: This is a 74-year-old obese  female with diastolic CHF HTN, major depressive disorder, sleep apnea, CVA, AAA repair, and Stage IIA (pT2, pN0(sn), cM0, G2, ER+, HI-, HER2-) invasive lobular carcinoma of right breast, status post lumpectomy with sentinel lymph node biopsy in 2019.  She has since been on aromatase inhibitor and has been tolerating well.     Restaging PET-CT scan performed on 11/4/2020 revealed large hypermetabolic right subpectoral lymph node mass measuring 6.9 x 3.2 cm with SUV max of 13.0.     Right chest wall lymph node biopsy performed on 11/10/2020 revealed fragments of benign lymph node with no evidence of malignancy.  Excisional biopsy of right subpectoral lymph node was again performed on 12/2/2020 and returned as benign with no evidence of metastatic disease.     Case was discussed at the tumor conference and recommendation was to continue aromatase inhibitor with plan for repeat short interval imaging to reassess the right subpectoral lymph node mass.  This was discussed with patient, however, she was not satisfied with the recommendation and wanted a 2nd opinion with a different oncologist.     She was referred to breast surgeon in [facility]. She had MRI and has a planned surgical resection on 3/1/2021. Status post lymph node excision on 3/1/2021 that showed follicular  hyperplasia.    She is currently on Femara and Eliquis.    Interval History: Patient presents for follow up on Femara and Eliquis. She presents alone and reports adherence to each.  She reports feeling better since receiving 3 of the 5 doses of IV Venofer ordered for her. She reports having had issues with her BP a few times when scheduled to receive iron; she was unable to get it at those times but intends to finish the last 2 doses. Iron levels have improved to WNL. CBC, CMP stable with slight improvement in kidney function. She states she has been in contact with her nephrologist and PCP to gain better control of BP and kidneys. She is now ready to assess her right breast drainage. Will have MMG/US scheduled for evaluation.     Reviewed labs with patient:   CBC:   Recent Labs   Lab 04/15/24  1030   WBC 7.10   RBC 5.25   Hemoglobin 13.4   Hematocrit 43.9   Platelets 281   MCV 84   MCH 25.5 L   MCHC 30.5 L     CMP:  Recent Labs   Lab 04/15/24  1030   Glucose 114 H   Calcium 9.7   Albumin 3.5   Total Protein 6.9   Sodium 141   Potassium 4.1   CO2 19 L   Chloride 112 H   BUN 20   Creatinine 1.2   Alkaline Phosphatase 138 H   ALT 9 L   AST 10   Total Bilirubin 0.3     Lab Results   Component Value Date    IRON 80 04/15/2024    TRANSFERRIN 190 (L) 04/15/2024    TIBC 281 04/15/2024    FESATURATED 28 04/15/2024      Lab Results   Component Value Date    FERRITIN 125 04/15/2024     The patient location is: [place]  The chief complaint leading to consultation is: iron deficiency, breast cancer    Visit type: audiovisual    Face to Face time with patient: 19 minutes  36 minutes of total time spent on the encounter, which includes face to face time and non-face to face time preparing to see the patient (eg, review of tests), Obtaining and/or reviewing separately obtained history, Documenting clinical information in the electronic or other health record, Independently interpreting results (not separately reported) and  communicating results to the patient/family/caregiver, or Care coordination (not separately reported).     Each patient to whom he or she provides medical services by telemedicine is:  (1) informed of the relationship between the physician and patient and the respective role of any other health care provider with respect to management of the patient; and (2) notified that he or she may decline to receive medical services by telemedicine and may withdraw from such care at any time.    Social History     Socioeconomic History    Marital status:      Spouse name: Campos Luther    Number of children: 2   Tobacco Use    Smoking status: Never     Passive exposure: Past    Smokeless tobacco: Never   Substance and Sexual Activity    Alcohol use: No    Drug use: No    Sexual activity: Yes     Partners: Male     Birth control/protection: Post-menopausal     Social Determinants of Health     Financial Resource Strain: Low Risk  (3/26/2024)    Overall Financial Resource Strain (CARDIA)     Difficulty of Paying Living Expenses: Not hard at all   Food Insecurity: No Food Insecurity (3/26/2024)    Hunger Vital Sign     Worried About Running Out of Food in the Last Year: Never true     Ran Out of Food in the Last Year: Never true   Transportation Needs: No Transportation Needs (3/26/2024)    PRAPARE - Transportation     Lack of Transportation (Medical): No     Lack of Transportation (Non-Medical): No   Physical Activity: Inactive (3/26/2024)    Exercise Vital Sign     Days of Exercise per Week: 2 days     Minutes of Exercise per Session: 0 min   Stress: No Stress Concern Present (3/26/2024)    [employer] - Occupational Stress Questionnaire     Feeling of Stress : Only a little   Social Connections: Socially Integrated (3/26/2024)    Social Connection and Isolation Panel [NHANES]     Frequency of Communication with Friends and Family: More than three times a week     Frequency of Social  Gatherings with Friends and Family: More than three times a week     Attends Taoism Services: More than 4 times per year     Active Member of Clubs or Organizations: Yes     Attends Club or Organization Meetings: More than 4 times per year     Marital Status:    Housing Stability: High Risk (3/26/2024)    Housing Stability Vital Sign     Unable to Pay for Housing in the Last Year: No     Number of Places Lived in the Last Year: 1     Unstable Housing in the Last Year: Yes     Past Medical History:   Diagnosis Date    Cataract     Bilateral    Chronic diastolic congestive heart failure 08/25/2020    Dizziness 03/12/2023    Encounter for blood transfusion     History of repair of aneurysm of abdominal aorta using endovascular stent graft     DR Bonds    Hx of psychiatric care     Hypertension     Major depressive disorder, single episode, moderate with anxious distress 01/14/2020    Malignant neoplasm of upper-outer quadrant of right breast in female, estrogen receptor positive 03/14/2019    radiation    Psychiatric problem     Sleep apnea     Sleep difficulties     Stroke 2009    no residual defect    Therapy      Family History   Problem Relation Name Age of Onset    Diabetes Maternal Grandmother Susu Jennings     Diabetes Maternal Grandfather N/A     Diabetes Mother Balbina Tena     Hypertension Mother Balbina Tena     Sickle cell anemia Daughter      Breast cancer Neg Hx      Colon cancer Neg Hx      Ovarian cancer Neg Hx       Past Surgical History:   Procedure Laterality Date    APPENDECTOMY      AXILLARY NODE DISSECTION Right 12/02/2020    Procedure: LYMPHADENECTOMY, AXILLARY;  Surgeon: Artemio Starks MD;  Location: [facility];  Service: General;  Laterality: Right;    BIOPSY OF AXILLARY NODE Right 03/02/2021    Procedure: BIOPSY, LYMPH NODE, AXILLARY;  Surgeon: Artemio Sutton MD;  Location: [address];  Service: General;  Laterality: Right;    BREAST BIOPSY      2019    BREAST  LUMPECTOMY Right     2019     SECTION      x 1    OOPHORECTOMY      right     SENTINEL LYMPH NODE BIOPSY Right 2019    Procedure: BIOPSY, LYMPH NODE, SENTINEL;  Surgeon: Artemio Starks MD;  Location: [facility];  Service: General;  Laterality: Right;    splenic artery aneurysm repair      TONSILLECTOMY       Review of Systems   Constitutional:  Negative for appetite change and fatigue.   HENT:  Negative for mouth sores, rhinorrhea and sore throat.    Eyes: Negative.    Respiratory: Negative.     Cardiovascular:  Positive for leg swelling (lymphedema).   Gastrointestinal:  Negative for constipation, diarrhea, nausea and vomiting.   Genitourinary: Negative.  Positive for hot flashes.   Musculoskeletal:  Positive for arthralgias (mild, improved).        Limited ROM due to muscle tension at surgical site   Integumentary:  Positive for breast mass, breast discharge (clear, from nipples) and breast tenderness. Negative.   Allergic/Immunologic: Negative.    Neurological:  Negative for weakness and numbness.   Hematological:  Does not bruise/bleed easily.   Psychiatric/Behavioral: Negative.     Breast: Positive for mass and tenderness.      Medication List with Changes/Refills   Current Medications    ACETAMINOPHEN (TYLENOL) 650 MG TBSR    Take 1 tablet (650 mg total) by mouth every 12 (twelve) hours as needed (Headaches.).    ALPRAZOLAM (XANAX) 0.5 MG TABLET    Take 1 tablet (0.5 mg total) by mouth 2 (two) times daily as needed for Anxiety.    APIXABAN (ELIQUIS) 5 MG TAB    Take 1 tablet (5 mg total) by mouth 2 (two) times daily.    ATORVASTATIN (LIPITOR) 20 MG TABLET    Take 1 tablet (20 mg total) by mouth every evening.    BUTALBITAL-ACETAMINOPHEN-CAFFEINE -40 MG (FIORICET, ESGIC) -40 MG PER TABLET    Take 2 tablets by mouth 2 (two) times daily.    CARVEDILOL (COREG) 6.25 MG TABLET    Take 1 tablet (6.25 mg total) by mouth 2 (two) times daily with meals.    CHOLECALCIFEROL, VITAMIN D3, 1,250 MCG  (50,000 UNIT) CAPSULE    Take 1 capsule (50,000 Units total) by mouth every 7 days.    CLONIDINE (CATAPRES) 0.1 MG TABLET    Take 2 tablets (0.2 mg total) by mouth 2 (two) times daily as needed (blood pressure greater than 170/90).    DICLOFENAC SODIUM (VOLTAREN) 1 % GEL    Apply 2 grams topically once daily.    GABAPENTIN (NEURONTIN) 300 MG CAPSULE    Take 2 capsules (600 mg total) by mouth 3 (three) times daily.    HYDRALAZINE (APRESOLINE) 100 MG TABLET    Take 1 tablet (100 mg total) by mouth 3 (three) times daily.    LETROZOLE (FEMARA) 2.5 MG TAB    Take 1 tablet (2.5 mg total) by mouth once daily.    LEVOCETIRIZINE (XYZAL) 5 MG TABLET    Take 1 tablet (5 mg total) by mouth every evening.    LIDOCAINE (LIDODERM) 5 %    Place 2 patches onto the skin once daily. Remove & Discard patch within 12 hours or as directed by MD    METHOCARBAMOL (ROBAXIN) 500 MG TAB    Take 1 tablet (500 mg total) by mouth 2 (two) times daily as needed.    MULTIVITAMIN (THERAGRAN) PER TABLET    Take 1 tablet by mouth once daily.    RANOLAZINE (RANEXA) 1,000 MG TB12    Take 1 tablet (1,000 mg total) by mouth 2 (two) times daily.    SACUBITRIL-VALSARTAN (ENTRESTO) 24-26 MG PER TABLET    Take 1 tablet by mouth 2 (two) times daily.    TAMSULOSIN (FLOMAX) 0.4 MG CAP    Take 1 capsule (0.4 mg total) by mouth once daily.    TOPIRAMATE (TOPAMAX) 25 MG TABLET    Take 1 tablet (25 mg total) by mouth 2 (two) times daily.    TORSEMIDE (DEMADEX) 20 MG TAB    Take 2 tablets (40 mg total) by mouth once daily.    VALSARTAN (DIOVAN) 80 MG TABLET    Take 1 tablet (80 mg total) by mouth once daily.    VENLAFAXINE (EFFEXOR-XR) 37.5 MG 24 HR CAPSULE    Take 1 capsule (37.5 mg total) by mouth once daily.    ZOLPIDEM (AMBIEN) 5 MG TAB    Take 1 tablet (5 mg total) by mouth nightly as needed.     Objective:     There were no vitals filed for this visit.  Physical Exam     Unable to assess due to virtual visit.    (2) Ambulatory and capable of self care, unable  to carry out work activity, up and about > 50% or waking hours  Assessment:     Problem List Items Addressed This Visit          Hematology    Acute pulmonary embolism     Likely provoked due to hypercoagulable state from breast cancer and obesity. Continue apixaban at 5 mg b.i.d..  Advised that she will be on long-term anticoagulation given recurrent thrombosis history as well as active malignancy.            Oncology    Malignant neoplasm of upper-outer quadrant of right breast in female, estrogen receptor positive - Primary (Chronic)     Diagnosed with Stage IIA (pT2, pN0(sn), cM0, G2, ER+, ID-, HER2-) invasive lobular carcinoma of right breast, status post lumpectomy with sentinel lymph node biopsy in 2019.  Currently on Femara and tolerating well.       Unfortunately, imaging studies have shown persistently enlarged right subpectoral lymph node as well as FDG avid left posterior cervical and supraclavicular lymph nodes. Multiple biopsies in the past have been nondiagnostic for malignancy.  Case was presented at multi-disciplinary tumor conference with recommendation to continue Arimidex.  Patient was not satisfied with recommendation a requested for 2nd opinion.     She was evaluated by Dr. Sutton in [place] who recommended MRI of breast.     MRI was performed on 2/23/2021 and showed lymph node mass deep to the right pectoralis minor muscle measuring 6.6 x 4.7 x 2.9 cm. There were also adjacent satellite lymph nodes anteriorly to the dominant lymph node measured 1.1 x 0.8 cm and 0.6 x 0.4 cm.  Also described was a mass effect upon the right subclavian vein.  The mass does not in case or envelope the subclavian neurovascular structures.     Given the above findings, recommendation was made to excise the mass if not for diagnostic purposes for pain management.  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 No evidence of metastatic carcinoma  or lymphoma.     She was continued on Femara with plan for short interval PET scan.     Patient had a PET scan on 4/14/2021 that showed interim right axillary lymph node excision with large residual hypermetabolic right subpectoral lymph node mass.  Also noted interval development of subcentimeter mildly FDG avid left posterior cervical and supraclavicular lymph nodes. No FDG avid mediastinal or hilar nodes.No FDG avid pulmonary nodules/masses.      Had diagnostic bilateral mammography on 9/24/2021, results showed a left breast subcentimeter mass at the 9 o'clock position which is new when compared to mammography from 2019.  Will plan a short-interval mammogram in 6 months to re-assess left breast mass.There was no mammographic evidence of disease in the right breast.     Saw Dr. Sutton in [place] who recommended a repeat PET-CT scan. Results from PET-CT scan showed interval enlargement of subpectoral mass on the right with sustained highly FDG avidity.     She continued to complain of shoulder pain and breast discomfort.  CT neck chest with contrast done on 3/17/2022, when compared to PET scan from 10/ 2021, the right subpectoral lymph node has not changed and still measures about 6 cm maximally.     Given continuing discomfort and pain associated with these enlarged lymph nodes.  She was referred to surgical oncologist following extensive conversation, it appears the risk of repeat surgical resection outweighs the benefits.  Decision was made to optimize neuropathic pain management.     Lyrica was initiated for neuropathic pain control. Continue Femara and pain management. Surveillance MMG from 10/2022 was BIRADS 2; recommend repeat in 1 year. Normal bone density on DEXA in 7/2023.          Iron deficiency     Treated with Venofer x 5 weekly doses.             Plan:     Malignant neoplasm of upper-outer quadrant of right breast in female, estrogen receptor positive    Acute pulmonary embolism, unspecified  pulmonary embolism type, unspecified whether acute cor pulmonale present    Iron deficiency    Labs reviewed; iron deficiency resolved.  Patient to complete the last 2 doses of IV Venofer.   Continue Femara and Eliquis as prescribed; no refills requested.   Continue vitamin D and calcium supplementation daily.  Surveillance MMG due 10/2023; will have rescheduled.  Follow up with EUGENE Castano after MMG to review results.   Follow up in 12 weeks  with  iron profile, ferritin, CBC, and Comprehensive Metabolic Panel.  Repeat DEXA due 7/2025.   Patient to resume postmastectomy exercises to improve ROM and muscle tension at surgical site.    Per NCCN Guidelines BINV-17:  H&P 1-4 times per year as clinically appropriate for 5 years then annually  Periodic screening for changes in family history and genetic testing indications and referral to genetic counseling as indicated  Educate, monitor, and refer for lymphedema management  MMG every 12 months  Routine imaging of reconstructed breast is not indicated  For patients receiving anthracycline-based therapy, see NCCN Guidelines for Survivorship for echocardiogram recommendations  In the absence of clinical signs and symptoms suggestive of recurrent disease, there is no indication for lab or imaging studies for metastases screening    Route Chart for Scheduling    Med Onc Chart Routing      Follow up with physician    Follow up with CELESTINA 3 months. after MMG with EUGENE Castano to review MMG; in 3 months to review labs   Infusion scheduling note    Injection scheduling note Venofer x 2 doses (to complete)   Labs CMP, ferritin and iron and TIBC   Scheduling:  Preferred lab:  Lab interval:  in 3 months, 2 days prior   Imaging Mammogram   ASAP with follow up with EUGENE Castano afterwards to review results   Pharmacy appointment No pharmacy appointment needed      Other referrals       No additional referrals needed             I will review assessment/plan with collaborating  physician.        JACKELINE Stewart

## 2024-04-17 NOTE — TELEPHONE ENCOUNTER
Left message with Malgorzata to return call     ----- Message from Vannessa Peoples sent at 4/17/2024 11:19 AM CDT -----  Contact: Malgorzata/[facility]  Malgorzata with [facility] is calling to speak with a nurse regarding mutual patient. Reports faxing a plan care on 3/31/24 and request to receive completed plan of care for patient to continue treatment. Please give [facility] a call back at [phone], ext: [phone] when possible.  Thank you,  GH

## 2024-04-17 NOTE — ASSESSMENT & PLAN NOTE
Diagnosed with Stage IIA (pT2, pN0(sn), cM0, G2, ER+, MO-, HER2-) invasive lobular carcinoma of right breast, status post lumpectomy with sentinel lymph node biopsy in 2019.  Currently on Femara and tolerating well.       Unfortunately, imaging studies have shown persistently enlarged right subpectoral lymph node as well as FDG avid left posterior cervical and supraclavicular lymph nodes. Multiple biopsies in the past have been nondiagnostic for malignancy.  Case was presented at multi-disciplinary tumor conference with recommendation to continue Arimidex.  Patient was not satisfied with recommendation a requested for 2nd opinion.     She was evaluated by Dr. Sutton in [place] who recommended MRI of breast.     MRI was performed on 2/23/2021 and showed lymph node mass deep to the right pectoralis minor muscle measuring 6.6 x 4.7 x 2.9 cm. There were also adjacent satellite lymph nodes anteriorly to the dominant lymph node measured 1.1 x 0.8 cm and 0.6 x 0.4 cm.  Also described was a mass effect upon the right subclavian vein.  The mass does not in case or envelope the subclavian neurovascular structures.     Given the above findings, recommendation was made to excise the mass if not for diagnostic purposes for pain management.  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 No evidence of metastatic carcinoma or lymphoma.     She was continued on Femara with plan for short interval PET scan.     Patient had a PET scan on 4/14/2021 that showed interim right axillary lymph node excision with large residual hypermetabolic right subpectoral lymph node mass.  Also noted interval development of subcentimeter mildly FDG avid left posterior cervical and supraclavicular lymph nodes. No FDG avid mediastinal or hilar nodes.No FDG avid pulmonary nodules/masses.      Had diagnostic bilateral mammography on 9/24/2021, results showed a left breast  subcentimeter mass at the 9 o'clock position which is new when compared to mammography from 2019.  Will plan a short-interval mammogram in 6 months to re-assess left breast mass.There was no mammographic evidence of disease in the right breast.     Saw Dr. Sutton in [place] who recommended a repeat PET-CT scan. Results from PET-CT scan showed interval enlargement of subpectoral mass on the right with sustained highly FDG avidity.     She continued to complain of shoulder pain and breast discomfort.  CT neck chest with contrast done on 3/17/2022, when compared to PET scan from 10/ 2021, the right subpectoral lymph node has not changed and still measures about 6 cm maximally.     Given continuing discomfort and pain associated with these enlarged lymph nodes.  She was referred to surgical oncologist following extensive conversation, it appears the risk of repeat surgical resection outweighs the benefits.  Decision was made to optimize neuropathic pain management.     Lyrica was initiated for neuropathic pain control. Continue Femara and pain management. Surveillance MMG from 10/2022 was BIRADS 2; recommend repeat in 1 year. Normal bone density on DEXA in 7/2023.

## 2024-04-18 ENCOUNTER — TELEPHONE (OUTPATIENT)
Dept: NEUROLOGY | Facility: CLINIC | Age: 74
End: 2024-04-18
Payer: MEDICARE

## 2024-04-18 DIAGNOSIS — C50.411 MALIGNANT NEOPLASM OF UPPER-OUTER QUADRANT OF RIGHT BREAST IN FEMALE, ESTROGEN RECEPTOR POSITIVE: ICD-10-CM

## 2024-04-18 DIAGNOSIS — J30.2 SEASONAL ALLERGIES: ICD-10-CM

## 2024-04-18 DIAGNOSIS — E78.5 HYPERLIPIDEMIA, UNSPECIFIED HYPERLIPIDEMIA TYPE: ICD-10-CM

## 2024-04-18 DIAGNOSIS — I50.32 CHRONIC DIASTOLIC CONGESTIVE HEART FAILURE: ICD-10-CM

## 2024-04-18 DIAGNOSIS — I26.99 ACUTE PULMONARY EMBOLISM, UNSPECIFIED PULMONARY EMBOLISM TYPE, UNSPECIFIED WHETHER ACUTE COR PULMONALE PRESENT: ICD-10-CM

## 2024-04-18 DIAGNOSIS — M79.604 BILATERAL LEG PAIN: ICD-10-CM

## 2024-04-18 DIAGNOSIS — M79.605 BILATERAL LEG PAIN: ICD-10-CM

## 2024-04-18 DIAGNOSIS — I10 PRIMARY HYPERTENSION: ICD-10-CM

## 2024-04-18 DIAGNOSIS — Z17.0 MALIGNANT NEOPLASM OF UPPER-OUTER QUADRANT OF RIGHT BREAST IN FEMALE, ESTROGEN RECEPTOR POSITIVE: ICD-10-CM

## 2024-04-18 RX ORDER — SACUBITRIL AND VALSARTAN 24; 26 MG/1; MG/1
1 TABLET, FILM COATED ORAL 2 TIMES DAILY
Qty: 120 TABLET | Refills: 2 | Status: CANCELLED | OUTPATIENT
Start: 2024-04-18

## 2024-04-18 RX ORDER — LETROZOLE 2.5 MG/1
2.5 TABLET, FILM COATED ORAL DAILY
Qty: 90 TABLET | Refills: 0 | Status: SHIPPED | OUTPATIENT
Start: 2024-04-18

## 2024-04-18 RX ORDER — CLONIDINE HYDROCHLORIDE 0.1 MG/1
0.2 TABLET ORAL 2 TIMES DAILY PRN
Qty: 60 TABLET | Refills: 11 | Status: CANCELLED | OUTPATIENT
Start: 2024-04-18

## 2024-04-18 RX ORDER — LEVOCETIRIZINE DIHYDROCHLORIDE 5 MG/1
5 TABLET, FILM COATED ORAL NIGHTLY
Qty: 90 TABLET | Refills: 0 | Status: SHIPPED | OUTPATIENT
Start: 2024-04-18

## 2024-04-18 RX ORDER — ATORVASTATIN CALCIUM 20 MG/1
20 TABLET, FILM COATED ORAL NIGHTLY
Qty: 90 TABLET | Refills: 0 | Status: SHIPPED | OUTPATIENT
Start: 2024-04-18

## 2024-04-18 RX ORDER — DICLOFENAC SODIUM 10 MG/G
2 GEL TOPICAL DAILY
Qty: 400 G | Refills: 1 | Status: SHIPPED | OUTPATIENT
Start: 2024-04-18

## 2024-04-18 NOTE — TELEPHONE ENCOUNTER
Called patient to reschedule their appt with Dr. ESPINOSA, since he will not be in town. Patient did not answer. Left Voicemail.

## 2024-04-19 ENCOUNTER — TELEPHONE (OUTPATIENT)
Dept: NEUROLOGY | Facility: CLINIC | Age: 74
End: 2024-04-19
Payer: MEDICARE

## 2024-04-19 NOTE — TELEPHONE ENCOUNTER
Called patient to reschedule their appt with Dr. ESPINOSA, since he will not be in town. Got the patient rescheduled to see Matthew for their f/u. Patient verbalized understanding.

## 2024-05-15 ENCOUNTER — TELEPHONE (OUTPATIENT)
Dept: PAIN MEDICINE | Facility: CLINIC | Age: 74
End: 2024-05-15
Payer: MEDICARE

## 2024-05-15 NOTE — TELEPHONE ENCOUNTER
----- Message from Kena Dixon sent at 5/15/2024  9:24 AM CDT -----  Contact: Susu Cristobal is needing a call back to reschedule her upcoming procedure on 5/22. Please call back at [phone].    Thank you summer

## 2024-05-17 ENCOUNTER — PATIENT MESSAGE (OUTPATIENT)
Dept: INTERNAL MEDICINE | Facility: CLINIC | Age: 74
End: 2024-05-17
Payer: MEDICARE

## 2024-05-17 DIAGNOSIS — F54 PSYCHOLOGICAL FACTORS AFFECTING MEDICAL CONDITION: ICD-10-CM

## 2024-05-17 RX ORDER — ALPRAZOLAM 0.5 MG/1
0.5 TABLET ORAL 2 TIMES DAILY PRN
Qty: 60 TABLET | Refills: 0 | Status: SHIPPED | OUTPATIENT
Start: 2024-05-17 | End: 2024-06-16

## 2024-05-17 RX ORDER — BUTALBITAL, ACETAMINOPHEN AND CAFFEINE 50; 325; 40 MG/1; MG/1; MG/1
1 TABLET ORAL 2 TIMES DAILY PRN
Qty: 20 TABLET | Refills: 0 | Status: SHIPPED | OUTPATIENT
Start: 2024-05-17 | End: 2024-06-07 | Stop reason: SDUPTHER

## 2024-05-17 NOTE — TELEPHONE ENCOUNTER
Pt is requesting a refill on the following attached medications also states she cancelled a Sx that she had scheduled due to another provider thinking she should get her BP under control first. Please advise.  Thanks //kah

## 2024-05-20 ENCOUNTER — HOSPITAL ENCOUNTER (OUTPATIENT)
Dept: RADIOLOGY | Facility: HOSPITAL | Age: 74
Discharge: HOME | End: 2024-05-20
Attending: NURSE PRACTITIONER
Payer: MEDICARE

## 2024-05-20 ENCOUNTER — INFUSION (OUTPATIENT)
Dept: INFUSION THERAPY | Facility: HOSPITAL | Age: 74
End: 2024-05-20
Attending: NURSE PRACTITIONER
Payer: MEDICARE

## 2024-05-20 VITALS
OXYGEN SATURATION: 96 % | RESPIRATION RATE: 22 BRPM | TEMPERATURE: 97 F | HEART RATE: 79 BPM | SYSTOLIC BLOOD PRESSURE: 165 MMHG | DIASTOLIC BLOOD PRESSURE: 95 MMHG

## 2024-05-20 DIAGNOSIS — C50.411 MALIGNANT NEOPLASM OF UPPER-OUTER QUADRANT OF RIGHT BREAST IN FEMALE, ESTROGEN RECEPTOR POSITIVE: ICD-10-CM

## 2024-05-20 DIAGNOSIS — E61.1 IRON DEFICIENCY: Primary | ICD-10-CM

## 2024-05-20 DIAGNOSIS — Z17.0 MALIGNANT NEOPLASM OF UPPER-OUTER QUADRANT OF RIGHT BREAST IN FEMALE, ESTROGEN RECEPTOR POSITIVE: ICD-10-CM

## 2024-05-20 PROBLEM — I26.99 ACUTE PULMONARY EMBOLISM: Status: RESOLVED | Noted: 2022-02-15 | Resolved: 2024-05-20

## 2024-05-20 PROCEDURE — A4216 STERILE WATER/SALINE, 10 ML: HCPCS | Mod: HCNC | Performed by: NURSE PRACTITIONER

## 2024-05-20 PROCEDURE — 25000003 PHARM REV CODE 250: Mod: HCNC | Performed by: NURSE PRACTITIONER

## 2024-05-20 PROCEDURE — 77062 BREAST TOMOSYNTHESIS BI: CPT | Mod: 26,HCNC,, | Performed by: RADIOLOGY

## 2024-05-20 PROCEDURE — 77062 BREAST TOMOSYNTHESIS BI: CPT | Mod: TC,HCNC

## 2024-05-20 PROCEDURE — 96374 THER/PROPH/DIAG INJ IV PUSH: CPT | Mod: HCNC

## 2024-05-20 PROCEDURE — 76642 ULTRASOUND BREAST LIMITED: CPT | Mod: TC,HCNC,RT

## 2024-05-20 PROCEDURE — 63600175 PHARM REV CODE 636 W HCPCS: Mod: HCNC | Performed by: NURSE PRACTITIONER

## 2024-05-20 PROCEDURE — 77066 DX MAMMO INCL CAD BI: CPT | Mod: 26,HCNC,, | Performed by: RADIOLOGY

## 2024-05-20 PROCEDURE — 76642 ULTRASOUND BREAST LIMITED: CPT | Mod: 26,HCNC,RT, | Performed by: RADIOLOGY

## 2024-05-20 RX ORDER — HEPARIN 100 UNIT/ML
500 SYRINGE INTRAVENOUS
Status: CANCELLED | OUTPATIENT
Start: 2024-05-27

## 2024-05-20 RX ORDER — DIPHENHYDRAMINE HYDROCHLORIDE 50 MG/ML
50 INJECTION INTRAMUSCULAR; INTRAVENOUS ONCE AS NEEDED
Status: CANCELLED | OUTPATIENT
Start: 2024-05-27

## 2024-05-20 RX ORDER — EPINEPHRINE 0.3 MG/.3ML
0.3 INJECTION SUBCUTANEOUS ONCE AS NEEDED
Status: CANCELLED | OUTPATIENT
Start: 2024-05-27

## 2024-05-20 RX ORDER — SODIUM CHLORIDE 0.9 % (FLUSH) 0.9 %
10 SYRINGE (ML) INJECTION
Status: CANCELLED | OUTPATIENT
Start: 2024-05-27

## 2024-05-20 RX ORDER — SODIUM CHLORIDE 0.9 % (FLUSH) 0.9 %
10 SYRINGE (ML) INJECTION
Status: DISCONTINUED | OUTPATIENT
Start: 2024-05-20 | End: 2024-05-20 | Stop reason: HOSPADM

## 2024-05-20 RX ADMIN — IRON SUCROSE 200 MG: 20 INJECTION, SOLUTION INTRAVENOUS at 12:05

## 2024-05-20 RX ADMIN — Medication 10 ML: at 12:05

## 2024-05-20 NOTE — PLAN OF CARE
Problem: Adult Inpatient Plan of Care  Goal: Plan of Care Review  Outcome: Progressing  Flowsheets (Taken 5/20/2024 1228)  Plan of Care Reviewed With: patient  Goal: Optimal Comfort and Wellbeing  Outcome: Progressing  Intervention: Provide Person-Centered Care  Flowsheets (Taken 5/20/2024 1228)  Trust Relationship/Rapport:   care explained   thoughts/feelings acknowledged   emotional support provided   choices provided   empathic listening provided   questions encouraged   reassurance provided   questions answered

## 2024-05-22 ENCOUNTER — PATIENT MESSAGE (OUTPATIENT)
Dept: CARDIOLOGY | Facility: CLINIC | Age: 74
End: 2024-05-22

## 2024-05-22 ENCOUNTER — OFFICE VISIT (OUTPATIENT)
Dept: CARDIOLOGY | Facility: CLINIC | Age: 74
End: 2024-05-22
Payer: OTHER GOVERNMENT

## 2024-05-22 VITALS — DIASTOLIC BLOOD PRESSURE: 90 MMHG | SYSTOLIC BLOOD PRESSURE: 130 MMHG | HEART RATE: 76 BPM | OXYGEN SATURATION: 96 %

## 2024-05-22 DIAGNOSIS — I26.99 RECURRENT PULMONARY EMBOLI: ICD-10-CM

## 2024-05-22 DIAGNOSIS — M79.606 PAIN OF LOWER EXTREMITY, UNSPECIFIED LATERALITY: ICD-10-CM

## 2024-05-22 DIAGNOSIS — G47.33 OSA (OBSTRUCTIVE SLEEP APNEA): ICD-10-CM

## 2024-05-22 DIAGNOSIS — I10 HYPERTENSION, UNSPECIFIED TYPE: ICD-10-CM

## 2024-05-22 DIAGNOSIS — R53.1 RIGHT SIDED WEAKNESS: ICD-10-CM

## 2024-05-22 DIAGNOSIS — R55 SYNCOPE AND COLLAPSE: ICD-10-CM

## 2024-05-22 DIAGNOSIS — R06.02 SOB (SHORTNESS OF BREATH): ICD-10-CM

## 2024-05-22 DIAGNOSIS — E78.5 HYPERLIPIDEMIA, UNSPECIFIED HYPERLIPIDEMIA TYPE: ICD-10-CM

## 2024-05-22 DIAGNOSIS — E66.01 CLASS 3 SEVERE OBESITY WITH SERIOUS COMORBIDITY AND BODY MASS INDEX (BMI) OF 40.0 TO 44.9 IN ADULT, UNSPECIFIED OBESITY TYPE: ICD-10-CM

## 2024-05-22 DIAGNOSIS — C50.411 MALIGNANT NEOPLASM OF UPPER-OUTER QUADRANT OF RIGHT BREAST IN FEMALE, ESTROGEN RECEPTOR POSITIVE: ICD-10-CM

## 2024-05-22 DIAGNOSIS — I71.40 ABDOMINAL AORTIC ANEURYSM (AAA) WITHOUT RUPTURE, UNSPECIFIED PART: ICD-10-CM

## 2024-05-22 DIAGNOSIS — Z86.711 HISTORY OF PULMONARY EMBOLISM: ICD-10-CM

## 2024-05-22 DIAGNOSIS — F33.1 MAJOR DEPRESSIVE DISORDER, RECURRENT EPISODE, MODERATE WITH ANXIOUS DISTRESS: ICD-10-CM

## 2024-05-22 DIAGNOSIS — Z86.73 HISTORY OF CVA (CEREBROVASCULAR ACCIDENT): ICD-10-CM

## 2024-05-22 DIAGNOSIS — Z17.0 MALIGNANT NEOPLASM OF UPPER-OUTER QUADRANT OF RIGHT BREAST IN FEMALE, ESTROGEN RECEPTOR POSITIVE: ICD-10-CM

## 2024-05-22 DIAGNOSIS — M79.2 NEUROPATHIC PAIN: ICD-10-CM

## 2024-05-22 DIAGNOSIS — I50.32 CHRONIC DIASTOLIC CONGESTIVE HEART FAILURE: Primary | ICD-10-CM

## 2024-05-22 DIAGNOSIS — R60.9 EDEMA, UNSPECIFIED TYPE: ICD-10-CM

## 2024-05-22 DIAGNOSIS — N18.31 CHRONIC KIDNEY DISEASE, STAGE 3A: ICD-10-CM

## 2024-05-22 PROCEDURE — 99214 OFFICE O/P EST MOD 30 MIN: CPT | Mod: PBBFAC | Performed by: STUDENT IN AN ORGANIZED HEALTH CARE EDUCATION/TRAINING PROGRAM

## 2024-05-22 PROCEDURE — 99214 OFFICE O/P EST MOD 30 MIN: CPT | Mod: S$GLB,,, | Performed by: STUDENT IN AN ORGANIZED HEALTH CARE EDUCATION/TRAINING PROGRAM

## 2024-05-22 PROCEDURE — 99999 PR PBB SHADOW E&M-EST. PATIENT-LVL IV: CPT | Mod: PBBFAC,,, | Performed by: STUDENT IN AN ORGANIZED HEALTH CARE EDUCATION/TRAINING PROGRAM

## 2024-05-22 RX ORDER — DAPAGLIFLOZIN 10 MG/1
10 TABLET, FILM COATED ORAL DAILY
Qty: 90 TABLET | Refills: 3 | Status: SHIPPED | OUTPATIENT
Start: 2024-05-22 | End: 2024-06-12 | Stop reason: SDUPTHER

## 2024-05-22 RX ORDER — VALSARTAN 80 MG/1
80 TABLET ORAL 2 TIMES DAILY
Qty: 180 TABLET | Refills: 3 | Status: SHIPPED | OUTPATIENT
Start: 2024-05-22 | End: 2024-05-30

## 2024-05-22 NOTE — PROGRESS NOTES
Subjective:   Patient ID:  Susu Luther is a 74 y.o. female who presents for follow up of No chief complaint on file.      12/1/20  69 yo female, care establish. Prior cardiologist Dr ackerman   [facility] HTN, CVA (2009), Right breast CA, Lumpectomy 3/2019, h/o PE off OAC 2 yrs ago.  AAA, s/p transcutaneous patch by Dr. Bonds, obesity knee OA imbalanced walker dependent  C/o SOB after walking few steps and dizziness. Had vision issue 1 month ago due to uncontrolled HTN  No chest pain  Sleeps with 2 pillows  Decent appetites  Leg calf pain worse at night  No smoking/drinking  ekg today NSR LVH.    ECH normal EF, grade II DD, LAE and PAP 56 mmHG   Chest CTA negative for PE  BP high    A1c controlled    S/p Open subpectoral lymph node biopsy on the right on 12/02/2020 by Dr. Starks  Still right chest, under arm and shoudler supersensitive pain      Shortness of Breath  Associated symptoms include chest pain and leg swelling. Pertinent negatives include no abdominal pain, claudication, fever, headaches, neck pain, orthopnea, PND, rash, sputum production, syncope, vomiting or wheezing.   Chest Pain   Associated symptoms include shortness of breath. Pertinent negatives include no abdominal pain, back pain, claudication, cough, diaphoresis, dizziness, fever, headaches, irregular heartbeat, malaise/fatigue, nausea, near-syncope, numbness, orthopnea, palpitations, PND, sputum production, syncope, vomiting or weakness.   Her past medical history is significant for CHF.   Pertinent negatives for past medical history include no seizures.   Congestive Heart Failure  Associated symptoms include chest pain and shortness of breath. Pertinent negatives include no abdominal pain, claudication, near-syncope or palpitations.     2/28/22  Patient was admitted to [facility] for shortness of breath.  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  Started on heparin drip  in transition to oral anticoagulation, now on Eliquis.  Recurrent PE.  On Femara. Has another lump in breast on right side, recently seen on PET scan.   Due to TANNER, was told to stop hctz, telmisartan.  She has ran out of clonidine. Does not take the ASA, hydralazine, amlodipine.   Blood pressure normotensive.  Reports severe headaches.  Has been out of Fioricet.  Echocardiogram with normal EF  Reports shortness of breath, chest heaviness mostly right-sided.      3/14/22  Still having SOB due to PE.  No bleeding issues while on eliquis.  Chest pain is substernal to right sided.   Lasix doesn't seem to help.   A reports intermittent leg pain right > left.  DVT ultrasound in-hospital with no evidence of DVT.  Following Hematology-Oncology.  Patient does not want surgery if needed for possible breast cancer.  Denies syncope, fever, chills.         4/18/22  Comes in with daughter who lives in [place].  Concerned that she may oxygen issues and may need home oxygen. O2 98%  Reports LE swelling and SOB  Reports chest pain comes on with SOB, did not have chest pain prior to PE  Has not been on lasix, has been held  Reports balancing issues- can do PT once symptoms improve   Denies syncope, fever, chills.       5/16/22  Has been feeling weak last couple days  Feels chest tightness with walking, also CURIEL- feels worse than before. 97% O2 in clinic   Reports dizziness after taking medications   BP low today   Says recurrent cancer may be spreading   No bleeding on eliquis   Reports orthopnea, PND.  Not feeling well- Does not want to the hospital     Denies syncope.      6/13/22  Not feeling well today  Reports chest pain and SOB worsening over the last 2 weeks   Ddimer trending, downTroponin neg and BNP nl on 5/16/22  EKG today without significant abnormalities   Reports recent diagnosis of breast cancer is progressing  Reports right-sided arm weakness  Patient has been increasingly stressed    Denies syncope, lower extremity  swelling.      7/6/2022   went to emergency room 6/13/2022, was worked up for stroke  MRI brain negative  Repeat CTA chest without PE, right-sided mass 6 cm, stable  All blood pressure medications.  Due to hypotension  Started taking Lasix today  Has significant lower extremity pain bilateral, reports blue feet at times      8/9/22  Virtual visit  Ambulates with walker   Had a fall recently due to syncopal episode, went to urgent care, negative workup per patient  Syncopal event occurred while standing up  Last visit Lasix was increased, patient reports increased thirst and water intake  Has also been taking carvedilol, reports low blood pressure  Chest pain worsened after syncopal event, has bruising on her body      9/7/22  Reports leg swelling, left  Has been taking eliquis also   Restarted taking lasix 2 weeks ago  Recently started on lyrica   Still wearing cardiac monitor  Still having SOB and chest discomfort  U/S arterial w/o significant stenosis   BP elevated, high at home  Lost 8 lbs since 9/1      10/12/22  Virtual visit  Doing well, still getting chest pain   Still has shortness of breath but has improved   Was able to go to a football game without any issues   DVT ultrasound without thrombus   Has been treated for gout, improvement of toe pain  Has been having intermittent blood pressure elevated readings at home        2/20/23  Stress test normal   BP elevated now  Has been more tired  Drinking lots of water  Chest pain has improved with imdur  SOB stable   Has chronic pain and chronic fatigue         3/21/23  Patient was recently admitted to the hospital for right arm weakness, TANNER, chest pain, shortness of breath   MRI/CT scan of the brain were unremarkable   CTA did not show PE   Had elevated creatinine, renal ultrasound did not show arterial stenosis  BP noted to be significantly elevated   Blood pressure meds were changed, patient only taking Entresto and amlodipine   Coreg was stopped  Reports  right-sided breast lump/mass currently being investigated    Today, reports still having shortness of breath  Waiting to have ultrasound done for right breast lump  Blood pressure stable today      4/25/23  Virtual visit   Continues to have intermittent chest pain, shortness of breath   Has not fallen since last visit   Blood pressure has been stable   Metanephrine levels are elevated  No bleeding on Eliquis      9/18/23  Virtual visit   Was admitted to [facility] 7/23 for chest pain and recurrent falls and worsening swelling in her legs  Lasix was switched to torsemide  Continues to have leg pain and swelling  Has been seeing Maria Isabel in [facility]  Currently taking torsemide 20 mg b.i.d.  No improvement in her leg pain and leg swelling since discharge from the hospital   Recent DVT ultrasound of right leg with no clot  Has been wearing compression stockings but difficult recently due to leg swelling      9/25/23  Virtual visit   Took metolazone once  Taking torsemide 40 mg b.i.d.  Swelling improving per patient but not a lot pain   CKD mildly worsening on recent labs        9/29/23  Virtual visit  Worsening renal function with taking increased dose of torsemide and metolazone x 2 doses   Swelling has improved and patient now able to walk w/o pain  Was told to hold diuretics for 1 week and have f/u labs  No SOB  Has not gotten contacted by lymphedema clinic at [facility]      10/25/23  Swelling has improved in legs, still having pain with ambulation   Insurance did not cover lymphedema clinic at [facility] or [facility]  Has shortness of breath intermittent      1/10/24  Going to lymphedema clinic in [facility] improving, now able to walk on them  Fell x2 1 month ago   Stopped amlodipine  Fluctuations in BP, can be high at times  Wearing compression stockings  BP stable   Says gabapentin may be causing weight gain  Weight going up significantly   SOB stable   Has been eating out- has been salty       3/20/24   Virtual visit  Has  been having significant lower extremity swelling  Reports someone took her torsemide  Blood pressure has been significantly elevated   Has been short of breath  Getting iron infusions and seen pain management  Taking clonidine p.r.n.   Compliant with all medications  Potassium level high limit of normal recently      5/22/24  Granddaughter just graduated in [place] and traveled to [place]- had headache, BP was high - was also having SOb  Wants to travel internationally for wedding anniversary- 52 years   BP elevated  Still having SOB   Could not check weight today- the scale not working   Was getting legs wrapped 3 x week- not helping edema   Wearing compression stockings   Not taking entresto- ran out  Started on valsartan by [facility]        Ekg 5/22/24 NSR, PVCs, LAD, LVH, prolonged  ms   Ekg 7/12/23 NSR, LAFB, LVH  EKG 2/20/23 NSR, PVCs, LAD, minimal LVH  EKG 6/13/22 NSR, LAD, LVH, 93 bpm, qtc 455 ms  EKG 5/16/22 SB, incomplete RBBB, LVH, septal infarct, qtc 422 ms         Echo 2/28/22  The left ventricle is normal in size with concentric hypertrophy and normal systolic function.  The estimated ejection fraction is 60%.  Normal left ventricular diastolic function.  Normal right ventricular size with normal right ventricular systolic function.  Mild to moderate tricuspid regurgitation.  Normal central venous pressure (3 mmHg).  The estimated PA systolic pressure is 36 mmHg.       Echo 2019  CONCLUSIONS     1 - Mild left atrial enlargement.     2 - Concentric hypertrophy.     3 - No wall motion abnormalities.     4 - Normal left ventricular systolic function (EF 60-65%).     5 - Impaired LV relaxation, elevated LAP (grade 2 diastolic dysfunction).     6 - Normal right ventricular systolic function .     7 - The estimated PA systolic pressure is 36 mmHg.     8 - Mild tricuspid regurgitation.        Past Medical History:   Diagnosis Date    Cataract     Bilateral    Chronic diastolic congestive heart failure  2020    Dizziness 2023    Encounter for blood transfusion     History of repair of aneurysm of abdominal aorta using endovascular stent graft     DR Bonds    Hx of psychiatric care     Hypertension     Major depressive disorder, single episode, moderate with anxious distress 2020    Malignant neoplasm of upper-outer quadrant of right breast in female, estrogen receptor positive 2019    radiation    Psychiatric problem     Sleep apnea     Sleep difficulties     Stroke 2009    no residual defect    Therapy        Past Surgical History:   Procedure Laterality Date    APPENDECTOMY      AXILLARY NODE DISSECTION Right 2020    Procedure: LYMPHADENECTOMY, AXILLARY;  Surgeon: Artemio Starks MD;  Location: [facility] OR;  Service: General;  Laterality: Right;    BIOPSY OF AXILLARY NODE Right 2021    Procedure: BIOPSY, LYMPH NODE, AXILLARY;  Surgeon: Artemio Sutton MD;  Location: [address];  Service: General;  Laterality: Right;    BREAST BIOPSY      2019    BREAST LUMPECTOMY Right     2019     SECTION      x 1    OOPHORECTOMY      right     SENTINEL LYMPH NODE BIOPSY Right 2019    Procedure: BIOPSY, LYMPH NODE, SENTINEL;  Surgeon: Artemio Starks MD;  Location: [facility];  Service: General;  Laterality: Right;    splenic artery aneurysm repair      TONSILLECTOMY         Social History     Tobacco Use    Smoking status: Never     Passive exposure: Past    Smokeless tobacco: Never   Substance Use Topics    Alcohol use: No    Drug use: No       Family History   Problem Relation Name Age of Onset    Diabetes Maternal Grandmother Susu Jennings     Diabetes Maternal Grandfather N/A     Diabetes Mother Balbina Tena     Hypertension Mother Balbina Tena     Sickle cell anemia Daughter      Breast cancer Neg Hx      Colon cancer Neg Hx      Ovarian cancer Neg Hx           Review of Systems   Constitutional: Negative for decreased appetite, diaphoresis, fever,  malaise/fatigue and night sweats.   HENT:  Negative for nosebleeds.    Eyes:  Negative for blurred vision and double vision.   Cardiovascular:  Positive for chest pain and leg swelling. Negative for claudication, irregular heartbeat, near-syncope, orthopnea, palpitations, paroxysmal nocturnal dyspnea and syncope.   Respiratory:  Positive for shortness of breath. Negative for cough, sleep disturbances due to breathing, snoring, sputum production and wheezing.    Endocrine: Negative for cold intolerance and polyuria.   Hematologic/Lymphatic: Does not bruise/bleed easily.   Skin:  Negative for rash.   Musculoskeletal:  Negative for back pain, falls, joint pain, joint swelling and neck pain.        Right chest breast and shoulder pain   Gastrointestinal:  Negative for abdominal pain, heartburn, nausea and vomiting.   Genitourinary:  Negative for dysuria, frequency and hematuria.   Neurological:  Negative for difficulty with concentration, dizziness, focal weakness, headaches, light-headedness, numbness, seizures and weakness.   Psychiatric/Behavioral:  Negative for depression, memory loss and substance abuse. The patient does not have insomnia.    Allergic/Immunologic: Negative for HIV exposure and hives.     Vitals:    05/22/24 1130 05/22/24 1149   BP: (!) 140/100 (!) 130/90   BP Location: Left arm Left arm   Patient Position: Sitting    BP Method: Large (Manual)    Pulse: 76    SpO2: 96%                Objective:   Physical Exam  Constitutional:       Comments: Mild distress.    HENT:      Head: Normocephalic.   Eyes:      Pupils: Pupils are equal, round, and reactive to light.   Neck:      Thyroid: No thyromegaly.      Vascular: Normal carotid pulses. No carotid bruit or JVD.   Cardiovascular:      Rate and Rhythm: Normal rate and regular rhythm. No extrasystoles are present.     Chest Wall: PMI is not displaced.      Pulses: Normal pulses.      Heart sounds: Normal heart sounds. No murmur heard.     No gallop. No S3  sounds.   Pulmonary:      Effort: No respiratory distress.      Breath sounds: Normal breath sounds. No stridor.   Abdominal:      General: Bowel sounds are normal.      Palpations: Abdomen is soft.      Tenderness: There is no abdominal tenderness. There is no rebound.   Musculoskeletal:         General: Swelling present. Normal range of motion.      Comments: Tender to palpitation   Skin:     Findings: No rash.   Neurological:      Mental Status: She is alert and oriented to person, place, and time.   Psychiatric:         Behavior: Behavior normal.         Lab Results   Component Value Date    CHOL 197 01/18/2024    CHOL 234 (H) 02/28/2023    CHOL 229 (H) 04/18/2022     Lab Results   Component Value Date    HDL 50 01/18/2024    HDL 44 02/28/2023    HDL 45 04/18/2022     Lab Results   Component Value Date    LDLCALC 120.4 01/18/2024    LDLCALC 151.8 02/28/2023    LDLCALC 150.4 04/18/2022     Lab Results   Component Value Date    TRIG 133 01/18/2024    TRIG 191 (H) 02/28/2023    TRIG 168 (H) 04/18/2022     Lab Results   Component Value Date    CHOLHDL 25.4 01/18/2024    CHOLHDL 18.8 (L) 02/28/2023    CHOLHDL 19.7 (L) 04/18/2022       Chemistry        Component Value Date/Time     04/15/2024 1030    K 4.1 04/15/2024 1030     (H) 04/15/2024 1030    CO2 19 (L) 04/15/2024 1030    BUN 20 04/15/2024 1030    CREATININE 1.2 04/15/2024 1030     (H) 04/15/2024 1030        Component Value Date/Time    CALCIUM 9.7 04/15/2024 1030    ALKPHOS 138 (H) 04/15/2024 1030    AST 10 04/15/2024 1030    ALT 9 (L) 04/15/2024 1030    BILITOT 0.3 04/15/2024 1030    ESTGFRAFRICA 37 (A) 07/06/2022 1312    EGFRNONAA 32 (A) 07/06/2022 1312          Lab Results   Component Value Date    HGBA1C 5.9 (H) 05/18/2019     Lab Results   Component Value Date    TSH 1.691 03/10/2023     Lab Results   Component Value Date    INR 1.1 03/11/2023    INR 0.9 02/15/2022    INR 1.0 11/10/2020     Lab Results   Component Value Date    WBC 7.10  04/15/2024    HGB 13.4 04/15/2024    HCT 43.9 04/15/2024    MCV 84 04/15/2024     04/15/2024     BMP  Sodium   Date Value Ref Range Status   04/15/2024 141 136 - 145 mmol/L Final     Potassium   Date Value Ref Range Status   04/15/2024 4.1 3.5 - 5.1 mmol/L Final     Chloride   Date Value Ref Range Status   04/15/2024 112 (H) 95 - 110 mmol/L Final     CO2   Date Value Ref Range Status   04/15/2024 19 (L) 23 - 29 mmol/L Final     BUN   Date Value Ref Range Status   04/15/2024 20 8 - 23 mg/dL Final     Creatinine   Date Value Ref Range Status   04/15/2024 1.2 0.5 - 1.4 mg/dL Final     Calcium   Date Value Ref Range Status   04/15/2024 9.7 8.7 - 10.5 mg/dL Final     Anion Gap   Date Value Ref Range Status   04/15/2024 10 8 - 16 mmol/L Final     eGFR if    Date Value Ref Range Status   07/06/2022 37 (A) >60 mL/min/1.73 m^2 Final     eGFR if non    Date Value Ref Range Status   07/06/2022 32 (A) >60 mL/min/1.73 m^2 Final     Comment:     Calculation used to obtain the estimated glomerular filtration  rate (eGFR) is the CKD-EPI equation.        BNP  @LABRCNTIP(BNP,BNPTRIAGEBLO)@  @LABRCNTIP(troponini)@  CrCl cannot be calculated (Patient's most recent lab result is older than the maximum 7 days allowed.).  No results found in the last 24 hours.  No results found in the last 24 hours.  No results found in the last 24 hours.    Assessment:      1. Chronic diastolic congestive heart failure    2. Class 3 severe obesity with serious comorbidity and body mass index (BMI) of 40.0 to 44.9 in adult, unspecified obesity type    3. Hypertension, unspecified type    4. NILS (obstructive sleep apnea)    5. Malignant neoplasm of upper-outer quadrant of right breast in female, estrogen receptor positive    6. History of pulmonary embolism    7. Major depressive disorder, recurrent episode, moderate with anxious distress    8. Neuropathic pain    9. Chronic kidney disease, stage 3a    10. Pain of  lower extremity, unspecified laterality    11. Abdominal aortic aneurysm (AAA) without rupture, unspecified part    12. Edema, unspecified type    13. History of CVA (cerebrovascular accident)    14. Right sided weakness    15. Recurrent pulmonary emboli    16. Syncope and collapse    17. SOB (shortness of breath)    18. Hyperlipidemia, unspecified hyperlipidemia type        Plan:     Left leg swelling/lymphedema- chronic  Continue torsemide 40 mg daily, holding metolazone for now  Add farxiga   Venous ultrasound 10/22 and 9/23 without DVT  Seeing lymphedema clinic    Diastolic heart failure  Echo 3/23 with normal EF, mild-mod TR  Labs in 2 weeks    Hypertension  Elevated  continue Coreg 6.25 b.i.d, hydralazine 100 mg t.i.d.  Entresto stopped, increase valsartan to 80 mg bid (was daily prior)  Clonidine prn for BP >170 or >110  Renal artery U/S 3/23, no stenosis   Check metanephrines    Chest pain/shortness of breath-stable  CTA 3/23 did not show PE   Stress test 1/23 normal stress test     Syncope- resolved   14 day monitor 8/22- 7.27% PACs, essentially asymptomatic    History of right breast cancer  Follows with Hematology-Oncology    Right leg pain-stable  DVT ultrasound 10/22 without evidence of DVT   Normal ABIs 3/22  Arterial ultrasound 8/22 without significant stenosis    Recurrent pulmonary embolus   Recurrent PE as of 2/22  Continue OAC    History of CVA  Carotid ultrasound 6/22 no significant stenosis, MRI brain 6/22 no abnormality  Currently not on aspirin as she is taking Eliquis  Continue statin    HLD  ->120 as of 1/24  Continue statin    AAA s/p transcutaneous patch  Stable    Morbid obesity, BMI > 40  Low-salt, low-fat diet  Exercise as tolerated      All labs and cardiac procedures reviewed.      Return to clinic 2 weeks     Arlene Hobbs MD  Cardiology Staff

## 2024-05-24 DIAGNOSIS — I10 PRIMARY HYPERTENSION: ICD-10-CM

## 2024-05-24 DIAGNOSIS — I50.32 CHRONIC DIASTOLIC CONGESTIVE HEART FAILURE: ICD-10-CM

## 2024-05-24 RX ORDER — SACUBITRIL AND VALSARTAN 24; 26 MG/1; MG/1
1 TABLET, FILM COATED ORAL 2 TIMES DAILY
Qty: 120 TABLET | Refills: 2 | OUTPATIENT
Start: 2024-05-24

## 2024-05-24 RX ORDER — CLONIDINE HYDROCHLORIDE 0.1 MG/1
0.2 TABLET ORAL 2 TIMES DAILY PRN
Qty: 180 TABLET | Refills: 0 | Status: SHIPPED | OUTPATIENT
Start: 2024-05-24 | End: 2024-08-22

## 2024-05-24 NOTE — TELEPHONE ENCOUNTER
Requested Prescriptions     Pending Prescriptions Disp Refills    sacubitriL-valsartan (ENTRESTO) 24-26 mg per tablet 120 tablet 2     Sig: Take 1 tablet by mouth 2 (two) times daily.

## 2024-05-24 NOTE — TELEPHONE ENCOUNTER
Requested Prescriptions     Pending Prescriptions Disp Refills    cloNIDine (CATAPRES) 0.1 MG tablet 60 tablet 11     Sig: Take 2 tablets (0.2 mg total) by mouth 2 (two) times daily as needed (blood pressure greater than 170/90).     LV 03/26/2024   NV 06/24/2024   LF02/06/2024

## 2024-05-27 NOTE — PROGRESS NOTES
"Subjective:       Patient ID: Susu Luther is a 74 y.o. female.    PMH: Abdominal Aortic Aneurysm / Left Eye Vitreous Hemorrhage / Iron Deficiency / Chronic Pain / CHF / Neuropathic Pain / Thyroid Nodules / Pre-Dm / PE / OA / CKD Stage 3 / Obesity / NILS and other medical conditions.     Chief Complaint: "Headaches"        HPI    The patient presented on 2024 for evaluation of "Headaches". Patient presents today for a follow-up evaluation and recommendation of "Headaches".     Patient is new to me, but known to Dr. Liu.     Interval History: (2024)    Started:  about a year or so.   Describes:  pressure  Timin to 12 hours.   Frequency: 3 to 4 times per week.   Pain:  4 to 7/ 10.   Location: Neck / front of the head.   Family: None.   Medications: NSAID's PRN / Fioricet's.   Worsen: physical activities.   Alleviated: relaxation /   Associated symptoms: dizziness ( lightheaded / spinning )   Triggers: none.   Prodrome symptoms: None           Referred by PCP.        HTN has been a problem.        Dizziness - lasting few seconds ( 30 to 40 ).       No relation between HA"s and dizziness.       Denied Nausea / phonophobia / photophobia.       No ED visit for the headaches.     Last visit (2024), there was a plan to wean off of Gabapentin -wean to BID x a week then 1 per a week then off. She was started on Topamax 25 mg PO BID for headache prevention.          New Issues:     Patient reports that she finished Gabapentin last month. She is no longer taking it. No side effects per patient.     Patient reports that she stopped taking Topamax 25 mg PO BID due to side effects of diarrhea. Patient reports seeing the kidney doctor who reported that the Topamax inducing diarrhea can cause dehydration and cause kidney injury. Patient repots that she stopped taking Topamax on the 2024. She reports that it did help prevent headaches from coming, but she could not tolerate the medication due to " diarrhea.     Headaches: Patient reports a headache during the visit.     Started: over 5 years ago, worse within the last 3 years ago  Describes: Throbbing   Timing: Worse in the morning upon awakening / Duration of 1 hour- 12 hours   Frequency: 3 times per day for regular headaches / 2 migraine type headaches per day  Pain: 5/10-10/10  Location: Bilateral frontal / Right-sided neck pain (Muscular)  Family: None  Medications: Fioricet- helps / Tylenol- does not help / Topamax-experienced side effects of diarrhea /   Worsen: High blood pressure / Stress  Alleviated: Cold compress helps / Lay down in a dark and quiet room /   Associated symptoms: Sensitivity to light and sound / Dizziness / Nausea /   Triggers: Stress / High blood pressure  Prodrome symptoms: None    Patient reports a CVA-2009  No recent falls  Patient reports no ER visits for headaches within the last year.     Weight loss: Patient reports losing about 5lbs since starting Topamax, but has not noticed any changes in weight since stopping Topamax. Patient reports following with Dietitian for weight management.     Eye clinic: Patient reports last eye clinic evaluation was over a year ago. She reports that no abnormalities were noted. Her next appointment is in July 2024. Patient reports blurry vision while reading from far distances and up close. She denies double vision / eye pain / eye discharge.     CPAP- Patient reports she is not compliant with CPAP Machine. She reports having a recall on her old machine and being sent a new machine-patient reports she can't breathe well and the new machine does not fit correctly. Patient is offered a referral for at home sleep study and referral to Sleep Disorders. She reports she is in contact with her CPAP company representative to see if she can get a new machine. Patient reports that if new CPAP machine does not work she will consider referral for at home sleep study and referral to Sleep Disorders in the  future.     Dizziness: Patient reports dizziness is associated with headaches and high blood pressure only. Frequency 3 times per day, duration of less than 30 seconds.      HTN: Patient reports that at home blood pressures run about 160-180/. She reports associated headaches with high blood pressure. Patient reports compliance with blood pressure medications without side effects.     Weakness of both lower extremities chronic x 1 year and currently in PT- improved with PT per patient- followed by PCP / Cardiologist / Hematology / Pain management / per patient      Review of Systems   Eyes:  Positive for photophobia and visual disturbance.   Gastrointestinal:  Positive for nausea.   Musculoskeletal:  Positive for neck pain.   Neurological:  Positive for dizziness, weakness and headaches.   All other systems reviewed and are negative.                Current Outpatient Medications:     acetaminophen (TYLENOL) 650 MG TbSR, Take 1 tablet (650 mg total) by mouth every 12 (twelve) hours as needed (Headaches.)., Disp: 20 tablet, Rfl: 1    ALPRAZolam (XANAX) 0.5 MG tablet, Take 1 tablet (0.5 mg total) by mouth 2 (two) times daily as needed for Anxiety., Disp: 60 tablet, Rfl: 0    apixaban (ELIQUIS) 5 mg Tab, Take 1 tablet (5 mg total) by mouth 2 (two) times daily., Disp: 180 tablet, Rfl: 1    atorvastatin (LIPITOR) 20 MG tablet, Take 1 tablet (20 mg total) by mouth every evening., Disp: 90 tablet, Rfl: 0    butalbital-acetaminophen-caffeine -40 mg (FIORICET, ESGIC) -40 mg per tablet, Take 1 tablet by mouth 2 (two) times daily as needed for Headaches., Disp: 20 tablet, Rfl: 0    carvediloL (COREG) 6.25 MG tablet, Take 1 tablet (6.25 mg total) by mouth 2 (two) times daily with meals., Disp: 60 tablet, Rfl: 6    cholecalciferol, vitamin D3, 1,250 mcg (50,000 unit) capsule, Take 1 capsule (50,000 Units total) by mouth every 7 days., Disp: 30 capsule, Rfl: 0    cloNIDine (CATAPRES) 0.1 MG tablet, Take 2 tablets  (0.2 mg total) by mouth 2 (two) times daily as needed (blood pressure greater than 170/90)., Disp: 180 tablet, Rfl: 0    dapagliflozin propanediol (FARXIGA) 10 mg tablet, Take 1 tablet (10 mg total) by mouth once daily., Disp: 90 tablet, Rfl: 3    diclofenac sodium (VOLTAREN) 1 % Gel, Apply 2 grams topically once daily., Disp: 400 g, Rfl: 1    gabapentin (NEURONTIN) 300 MG capsule, Take 2 capsules (600 mg total) by mouth 3 (three) times daily., Disp: 180 capsule, Rfl: 11    hydrALAZINE (APRESOLINE) 100 MG tablet, Take 1 tablet (100 mg total) by mouth 3 (three) times daily., Disp: 270 tablet, Rfl: 3    letrozole (FEMARA) 2.5 mg Tab, Take 1 tablet (2.5 mg total) by mouth once daily., Disp: 90 tablet, Rfl: 0    levocetirizine (XYZAL) 5 MG tablet, Take 1 tablet (5 mg total) by mouth every evening., Disp: 90 tablet, Rfl: 0    LIDOcaine (LIDODERM) 5 %, Place 2 patches onto the skin once daily. Remove & Discard patch within 12 hours or as directed by MD, Disp: 90 patch, Rfl: 1    methocarbamoL (ROBAXIN) 500 MG Tab, Take 1 tablet (500 mg total) by mouth 2 (two) times daily as needed., Disp: 60 tablet, Rfl: 2    multivitamin (THERAGRAN) per tablet, Take 1 tablet by mouth once daily., Disp: , Rfl:     ranolazine (RANEXA) 1,000 mg Tb12, Take 1 tablet (1,000 mg total) by mouth 2 (two) times daily., Disp: 180 tablet, Rfl: 3    sacubitriL-valsartan (ENTRESTO) 24-26 mg per tablet, Take 1 tablet by mouth 2 (two) times daily., Disp: 120 tablet, Rfl: 2    tamsulosin (FLOMAX) 0.4 mg Cap, Take 1 capsule (0.4 mg total) by mouth once daily., Disp: 30 capsule, Rfl: 0    topiramate (TOPAMAX) 25 MG tablet, Take 1 tablet (25 mg total) by mouth 2 (two) times daily., Disp: 60 tablet, Rfl: 1    torsemide (DEMADEX) 20 MG Tab, Take 2 tablets (40 mg total) by mouth once daily., Disp: 360 tablet, Rfl: 0    valsartan (DIOVAN) 80 MG tablet, Take 1 tablet (80 mg total) by mouth 2 (two) times daily., Disp: 180 tablet, Rfl: 3    venlafaxine (EFFEXOR-XR)  37.5 MG 24 hr capsule, Take 1 capsule (37.5 mg total) by mouth once daily., Disp: 30 capsule, Rfl: 11    zolpidem (AMBIEN) 5 MG Tab, Take 1 tablet (5 mg total) by mouth nightly as needed., Disp: 30 tablet, Rfl: 0    Past Medical History:   Diagnosis Date    Cataract     Bilateral    Chronic diastolic congestive heart failure 2020    Dizziness 2023    Encounter for blood transfusion     History of repair of aneurysm of abdominal aorta using endovascular stent graft     DR Bonds     of psychiatric care     Hypertension     Major depressive disorder, single episode, moderate with anxious distress 2020    Malignant neoplasm of upper-outer quadrant of right breast in female, estrogen receptor positive 2019    radiation    Psychiatric problem     Sleep apnea     Sleep difficulties     Stroke     no residual defect    Therapy        Past Surgical History:   Procedure Laterality Date    APPENDECTOMY      AXILLARY NODE DISSECTION Right 2020    Procedure: LYMPHADENECTOMY, AXILLARY;  Surgeon: Artemio Starks MD;  Location: [facility] OR;  Service: General;  Laterality: Right;    BIOPSY OF AXILLARY NODE Right 2021    Procedure: BIOPSY, LYMPH NODE, AXILLARY;  Surgeon: Artemio Sutton MD;  Location: [address];  Service: General;  Laterality: Right;    BREAST BIOPSY      2019    BREAST LUMPECTOMY Right     2019     SECTION      x 1    OOPHORECTOMY      right     SENTINEL LYMPH NODE BIOPSY Right 2019    Procedure: BIOPSY, LYMPH NODE, SENTINEL;  Surgeon: Artemio Starks MD;  Location: [facility];  Service: General;  Laterality: Right;    splenic artery aneurysm repair      TONSILLECTOMY         Social History     Socioeconomic History    Marital status:      Spouse name: Campos Luther    Number of children: 2   Tobacco Use    Smoking status: Never     Passive exposure: Past    Smokeless tobacco: Never   Substance and Sexual Activity    Alcohol use: No    Drug  use: No    Sexual activity: Yes     Partners: Male     Birth control/protection: Post-menopausal     Social Determinants of Health     Financial Resource Strain: Low Risk  (5/19/2024)    Overall Financial Resource Strain (CARDIA)     Difficulty of Paying Living Expenses: Not hard at all   Food Insecurity: No Food Insecurity (5/19/2024)    Hunger Vital Sign     Worried About Running Out of Food in the Last Year: Never true     Ran Out of Food in the Last Year: Never true   Transportation Needs: No Transportation Needs (3/26/2024)    PRAPARE - Transportation     Lack of Transportation (Medical): No     Lack of Transportation (Non-Medical): No   Physical Activity: Insufficiently Active (5/19/2024)    Exercise Vital Sign     Days of Exercise per Week: 3 days     Minutes of Exercise per Session: 20 min   Stress: No Stress Concern Present (5/19/2024)    [employer] - Occupational Stress Questionnaire     Feeling of Stress : Only a little   Housing Stability: High Risk (3/26/2024)    Housing Stability Vital Sign     Unable to Pay for Housing in the Last Year: No     Number of Places Lived in the Last Year: 1     Unstable Housing in the Last Year: Yes             Past/Current Medical/Surgical History, Past/Current Social History, Past/Current Family History and Past/Current Medications were reviewed in detail.        Objective:           VITAL SIGNS WERE REVIEWED      GENERAL APPEARANCE:     The patient looks comfortable.    BMI    No signs of respiratory distress.    Normal breathing pattern.    No dysmorphic features    Normal eye contact.       GENERAL MEDICAL EXAM:    HEENT:  Head is atraumatic normocephalic.     FUNDUSCOPIC (OPHTHALMOSCOPIC) EXAMINATION showed no disc edema (papilledema).      NECK: No JVD. No visible lesions or goiters.     CHEST-CARDIOPULMONARY: No cyanosis. No tachypnea. Normal respiratory effort.    [id number]: No jaundice. No stomas or lesions.  No visible hernias. No catheters.     SKIN, HAIR, NAILS: No pathognomonic skin rash.No neurofibromatosis. No visible lesions.No stigmata of autoimmune disease. No clubbing.    LIMBS: No varicose veins. No visible swelling.    MUSCULOSKELETAL: No visible deformities.No visible lesions.             Neurologic Exam     Mental Status   Oriented to person, place, and time.   Oriented to person.   Oriented to place. Oriented to country, city and area.   Oriented to time. Oriented to year, month, date, day and season.   Registration: recalls 3 of 3 objects. Recall at 5 minutes: recalls 3 of 3 objects. Follows 3 step commands.   Attention: normal. Concentration: normal.   Speech: speech is normal   Level of consciousness: alert  Knowledge: good. Able to perform simple calculations.   Able to name object. Able to read. Able to repeat. Able to write. Normal comprehension.     Cranial Nerves     CN II   Visual fields full to confrontation.   Visual acuity: normal  Right visual field deficit: none  Left visual field deficit: none     CN III, IV, VI   Pupils are equal, round, and reactive to light.  Extraocular motions are normal.   Right pupil: Size: 2 mm. Shape: regular. Reactivity: brisk. Consensual response: intact. Accommodation: intact.   Left pupil: Size: 2 mm. Shape: regular. Reactivity: brisk. Consensual response: intact. Accommodation: intact.   CN III: no CN III palsy  CN VI: no CN VI palsy  Nystagmus: none   Diplopia: none  Ophthalmoparesis: none  Upgaze: normal  Downgaze: normal  Conjugate gaze: present  Vestibulo-ocular reflex: present    CN V   Facial sensation intact.   Right facial sensation deficit: none  Left facial sensation deficit: none    CN VII   Facial expression full, symmetric.   Right facial weakness: none  Left facial weakness: none    CN VIII   CN VIII normal.   Hearing: intact    CN IX, X   CN IX normal.   CN X normal.   Palate: symmetric    CN XI   CN XI normal.   Right sternocleidomastoid  strength: normal  Left sternocleidomastoid strength: normal  Right trapezius strength: normal  Left trapezius strength: normal    CN XII   CN XII normal.   Tongue: not atrophic  Fasciculations: absent  Tongue deviation: none      Patient reports slight bilateral frontal headache during exam.      Motor Exam   Muscle bulk: normal  Overall muscle tone: normal  Right arm pronator drift: absent  Left arm pronator drift: absent  Right leg tone: decreased  Left leg tone: decreased    Strength   Strength 5/5 throughout.   Right neck flexion: 5/5  Left neck flexion: 5/5  Right neck extension: 5/5  Left neck extension: 5/5  Right deltoid: 5/5  Left deltoid: 5/5  Right biceps: 5/5  Left biceps: 5/5  Right triceps: 5/5  Left triceps: 5/5  Right wrist flexion: 5/5  Left wrist flexion: 5/5  Right wrist extension: 5/5  Left wrist extension: 5/5  Right interossei: 5/5  Left interossei: 5/5  Right abdominals: 5/5  Left abdominals: 5/5  Right iliopsoas: 3/5  Left iliopsoas: 3/5  Right quadriceps: 3/5  Left quadriceps: 3/5  Right hamstring: 3/5  Left hamstring: 3/5  Right glutei: 3/5  Left glutei: 3/5  Right anterior tibial: 3/5  Left anterior tibial: 3/5  Right posterior tibial: 3/5  Left posterior tibial: 3/5  Right peroneal: 3/5  Left peroneal: 3/5  Right gastroc: 3/5  Left gastroc: 3/5    Sensory Exam   Light touch normal.   Vibration normal.   Proprioception normal.   Pinprick normal.   Graphesthesia: normal  Stereognosis: normal    Gait, Coordination, and Reflexes     Gait  Gait: wide-based    Coordination   Romberg: negative  Finger to nose coordination: normal  Heel to shin coordination: abnormal  Tandem walking coordination: abnormal    Tremor   Resting tremor: absent  Intention tremor: absent  Action tremor: absent    Reflexes   Reflexes 2+ except as noted.   Right brachioradialis: 2+  Left brachioradialis: 2+  Right biceps: 2+  Left biceps: 2+  Right triceps: 2+  Left triceps: 2+  Right patellar: 2+  Left patellar:  2+  Right achilles: 2+  Left achilles: 2+  Right : 2+  Left : 2+  Right plantar: normal  Left plantar: normal  Right Peralta: absent  Left Peralta: absent  Right ankle clonus: absent  Left ankle clonus: absent  Right pendular knee jerk: absent  Left pendular knee jerk: absent    Patient uses roll aid walker for ambulating.          Lab Results   Component Value Date    WBC 7.10 04/15/2024    HGB 13.4 04/15/2024    HCT 43.9 04/15/2024    MCV 84 04/15/2024     04/15/2024       Sodium   Date Value Ref Range Status   04/15/2024 141 136 - 145 mmol/L Final     Potassium   Date Value Ref Range Status   04/15/2024 4.1 3.5 - 5.1 mmol/L Final     Chloride   Date Value Ref Range Status   04/15/2024 112 (H) 95 - 110 mmol/L Final     CO2   Date Value Ref Range Status   04/15/2024 19 (L) 23 - 29 mmol/L Final     Glucose   Date Value Ref Range Status   04/15/2024 114 (H) 70 - 110 mg/dL Final     BUN   Date Value Ref Range Status   04/15/2024 20 8 - 23 mg/dL Final     Creatinine   Date Value Ref Range Status   04/15/2024 1.2 0.5 - 1.4 mg/dL Final     Calcium   Date Value Ref Range Status   04/15/2024 9.7 8.7 - 10.5 mg/dL Final     Total Protein   Date Value Ref Range Status   04/15/2024 6.9 6.0 - 8.4 g/dL Final     Albumin   Date Value Ref Range Status   04/15/2024 3.5 3.5 - 5.2 g/dL Final     Total Bilirubin   Date Value Ref Range Status   04/15/2024 0.3 0.1 - 1.0 mg/dL Final     Comment:     For infants and newborns, interpretation of results should be based  on gestational age, weight and in agreement with clinical  observations.    Premature Infant recommended reference ranges:  Up to 24 hours.............<8.0 mg/dL  Up to 48 hours............<12.0 mg/dL  3-5 days..................<15.0 mg/dL  6-29 days.................<15.0 mg/dL       Alkaline Phosphatase   Date Value Ref Range Status   04/15/2024 138 (H) 55 - 135 U/L Final     AST   Date Value Ref Range Status   04/15/2024 10 10 - 40 U/L Final     ALT   Date  "Value Ref Range Status   04/15/2024 9 (L) 10 - 44 U/L Final     Anion Gap   Date Value Ref Range Status   04/15/2024 10 8 - 16 mmol/L Final     eGFR if    Date Value Ref Range Status   07/06/2022 37 (A) >60 mL/min/1.73 m^2 Final     eGFR if non    Date Value Ref Range Status   07/06/2022 32 (A) >60 mL/min/1.73 m^2 Final     Comment:     Calculation used to obtain the estimated glomerular filtration  rate (eGFR) is the CKD-EPI equation.          Lab Results   Component Value Date    [id number] 741 04/18/2022       Lab Results   Component Value Date    TSH 1.691 03/10/2023    FREET4 0.92 10/18/2021       No results found in the last 24 hours.    No results found in the last 24 hours.    Reviewed the neuroimaging independently       Assessment:   74 Years old Female with PMH as above came for a follow-up evaluation of "Headaches".   .   Chronic tension-type headache, not intractable     NILS (obstructive sleep apnea)     Class 3 severe obesity with serious comorbidity and body mass index (BMI) of 40.0 to 44.9 in adult, unspecified obesity type     Hypertension, unspecified type     Weakness of both lower extremities       Plan:   Patient Neurological Assessment is present for chronic bilateral lower extremity weakness and mild bilateral frontal headache during exam. Based on history and physical patient's headaches seem to be tension-type-triggered by stress and HTN.      -Patient is instructed on lifestyle modifications for weight management such as heart healthy diet, limiting excess sugar, limiting excess fried and fatty foods, weight management, eating lean meats and vegetables, staying hydrated, avoiding alcohol and smoking, and exercising at least 30 minutes per day. Patient instructed to continue taking medications as prescribed and to continue to follow PCP for management. Patient verbalizes understanding.      -Non compliance with CPaP - discussed it - Patient reports she is not " compliant with CPAP Machine. She reports having a recall on her old machine and being sent a new machine-patient reports she can't breathe well and the new machine does not fit correctly. Patient is offered a referral for at home sleep study and referral to Sleep Disorders-declines. She reports she is in contact with her CPAP company representative to see if she can get a new machine. Patient reports that if new CPAP machine does not work she will consider referral for at home sleep study and referral to Sleep Disorders in the future.     -Continue to follow ophthalmology for routine eye exams and for evaluation of bilateral blurry vision. Patient verbalizes understanding.     -Continue following with Dietitian for weight management. Patient verbalizes understanding.      -Discontinued Coreg 6.25 mg BID due to starting another BB.     -Discontinue Topamax 25 mg PO BID- due to side effects of Diarrhea.     -Start Inderal-LA 60 mg PO Daily for headache prevention and HTN management. Side effects discussed. Patient is instructed to check blood pressure and pulse frequently - 3 times per day- and to keep a BP log for provider review at her next visit. She is also instructed to ask her Cardiologist if it is okay to stop Coreg and start Inderal-LA. Patient verbalizes understanding and reports she will let Neurology office know what the Cardiologist says about the medication change.     -Continue PT for chronic bilateral lower extremity weakness.     -Blood pressure note to have improved from previous measurements according to medical record -Blood pressure is (137/91) P: 79 today. Continue HTN medications as prescribed and follow-ups with PCP and Cardiology for management of other medical conditions, including HTN.         LABORATORY EVALUATION    Labs: (5077-2057) CMP / CBC / Iron and TIBC / Ferritin / Fecal Immunochemical Test / Lipid Panel / BNP / Magnesium / Troponin / CK /  -personally reviewed -non-significant  abnormalities except     GFR (48.1) - decreased kidney function noted- noted to have improved from (43.7) 2 months ago- instructed patient to continue following PCP Nephrology for management. She verbalizes understanding.     RADIOLOGY EVALUATION     MRI Lumbar Spine Without Contrast- done 02/2024- personally reviewed - Multilevel lumbar spondylosis and degenerative disc disease, most pronounced at L5-S1     Head CT Without Contrast- done 12/2023- personally reviewed- no acute abnormalities noted    Brain MRI With and Without Contrast- done 03/2023- personally reviewed- no acute abnormalities noted    MRA Brain Without Contrast- done 03/2023- personally reviewed- No large vessel occlusion or critical stenosis / cerebral arteries disease distally     MRA Neck Without Contrast- done 03/2023- personally reviewed- non-significant stenosis or occlusion                     MEDICAL/SURGICAL COMORBIDITIES     All relevant medical comorbidities noted and managed by primary care physician and medical care team.          HEALTHY LIFESTYLE AND PREVENTATIVE CARE    The patient to adhere to the age-appropriate health maintenance guidelines including screening tests and vaccinations. The patient to adhere to  healthy lifestyle, optimal weight, exercise, healthy diet, good sleep hygiene and avoiding drugs including smoking, alcohol and recreational drugs.      I spent a total of  78 minutes on the day of the visit.This includes face to face time and non-face to face time preparing to see the patient (eg, review of tests), obtaining and/or reviewing separately obtained history, documenting clinical information in the electronic or other health record, independently interpreting results and communicating results to the patient/family/caregiver, or care coordinator.       Please do not hesitate to contact me with any updates, questions or concerns.    [id number]    Matthew Reyes, MSN, FNP-C    General Neurology

## 2024-05-28 ENCOUNTER — PATIENT MESSAGE (OUTPATIENT)
Dept: CARDIOLOGY | Facility: CLINIC | Age: 74
End: 2024-05-28
Payer: MEDICARE

## 2024-05-28 ENCOUNTER — PATIENT MESSAGE (OUTPATIENT)
Dept: INTERNAL MEDICINE | Facility: CLINIC | Age: 74
End: 2024-05-28
Payer: MEDICARE

## 2024-05-28 ENCOUNTER — OFFICE VISIT (OUTPATIENT)
Dept: NEUROLOGY | Facility: CLINIC | Age: 74
End: 2024-05-28
Payer: MEDICARE

## 2024-05-28 ENCOUNTER — PATIENT MESSAGE (OUTPATIENT)
Dept: NEUROLOGY | Facility: CLINIC | Age: 74
End: 2024-05-28

## 2024-05-28 VITALS
OXYGEN SATURATION: 99 % | RESPIRATION RATE: 16 BRPM | SYSTOLIC BLOOD PRESSURE: 137 MMHG | HEIGHT: 72 IN | HEART RATE: 79 BPM | WEIGHT: 293 LBS | BODY MASS INDEX: 39.68 KG/M2 | DIASTOLIC BLOOD PRESSURE: 91 MMHG

## 2024-05-28 DIAGNOSIS — G44.229 CHRONIC TENSION-TYPE HEADACHE, NOT INTRACTABLE: Primary | ICD-10-CM

## 2024-05-28 DIAGNOSIS — R29.898 WEAKNESS OF BOTH LOWER EXTREMITIES: ICD-10-CM

## 2024-05-28 DIAGNOSIS — E66.01 CLASS 3 SEVERE OBESITY WITH SERIOUS COMORBIDITY AND BODY MASS INDEX (BMI) OF 40.0 TO 44.9 IN ADULT, UNSPECIFIED OBESITY TYPE: Chronic | ICD-10-CM

## 2024-05-28 DIAGNOSIS — G47.33 OSA (OBSTRUCTIVE SLEEP APNEA): Chronic | ICD-10-CM

## 2024-05-28 DIAGNOSIS — I10 HYPERTENSION, UNSPECIFIED TYPE: ICD-10-CM

## 2024-05-28 PROCEDURE — 1160F RVW MEDS BY RX/DR IN RCRD: CPT | Mod: HCNC,CPTII,S$GLB,

## 2024-05-28 PROCEDURE — 3008F BODY MASS INDEX DOCD: CPT | Mod: HCNC,CPTII,S$GLB,

## 2024-05-28 PROCEDURE — 3075F SYST BP GE 130 - 139MM HG: CPT | Mod: HCNC,CPTII,S$GLB,

## 2024-05-28 PROCEDURE — 4010F ACE/ARB THERAPY RXD/TAKEN: CPT | Mod: HCNC,CPTII,S$GLB,

## 2024-05-28 PROCEDURE — 1125F AMNT PAIN NOTED PAIN PRSNT: CPT | Mod: HCNC,CPTII,S$GLB,

## 2024-05-28 PROCEDURE — 3066F NEPHROPATHY DOC TX: CPT | Mod: HCNC,CPTII,S$GLB,

## 2024-05-28 PROCEDURE — 3288F FALL RISK ASSESSMENT DOCD: CPT | Mod: HCNC,CPTII,S$GLB,

## 2024-05-28 PROCEDURE — 1101F PT FALLS ASSESS-DOCD LE1/YR: CPT | Mod: HCNC,CPTII,S$GLB,

## 2024-05-28 PROCEDURE — 99417 PROLNG OP E/M EACH 15 MIN: CPT | Mod: HCNC,S$GLB,,

## 2024-05-28 PROCEDURE — 3080F DIAST BP >= 90 MM HG: CPT | Mod: HCNC,CPTII,S$GLB,

## 2024-05-28 PROCEDURE — 1157F ADVNC CARE PLAN IN RCRD: CPT | Mod: HCNC,CPTII,S$GLB,

## 2024-05-28 PROCEDURE — 99215 OFFICE O/P EST HI 40 MIN: CPT | Mod: HCNC,S$GLB,,

## 2024-05-28 PROCEDURE — 1159F MED LIST DOCD IN RCRD: CPT | Mod: HCNC,CPTII,S$GLB,

## 2024-05-28 PROCEDURE — 99999 PR PBB SHADOW E&M-EST. PATIENT-LVL V: CPT | Mod: PBBFAC,HCNC,,

## 2024-05-28 RX ORDER — PROPRANOLOL HYDROCHLORIDE 60 MG/1
60 CAPSULE, EXTENDED RELEASE ORAL DAILY
Qty: 30 CAPSULE | Refills: 0 | Status: SHIPPED | OUTPATIENT
Start: 2024-05-28 | End: 2024-06-27

## 2024-05-28 RX ORDER — IRON SUCROSE 20 MG/ML
INJECTION, SOLUTION INTRAVENOUS
COMMUNITY
Start: 2024-03-04

## 2024-05-29 ENCOUNTER — OFFICE VISIT (OUTPATIENT)
Dept: PAIN MEDICINE | Facility: CLINIC | Age: 74
End: 2024-05-29
Payer: MEDICARE

## 2024-05-29 ENCOUNTER — INFUSION (OUTPATIENT)
Dept: INFUSION THERAPY | Facility: HOSPITAL | Age: 74
End: 2024-05-29
Attending: NURSE PRACTITIONER
Payer: MEDICARE

## 2024-05-29 VITALS
RESPIRATION RATE: 17 BRPM | BODY MASS INDEX: 39.68 KG/M2 | DIASTOLIC BLOOD PRESSURE: 94 MMHG | WEIGHT: 293 LBS | HEART RATE: 57 BPM | HEIGHT: 72 IN | SYSTOLIC BLOOD PRESSURE: 155 MMHG

## 2024-05-29 VITALS
OXYGEN SATURATION: 97 % | DIASTOLIC BLOOD PRESSURE: 89 MMHG | SYSTOLIC BLOOD PRESSURE: 149 MMHG | HEART RATE: 60 BPM | RESPIRATION RATE: 18 BRPM | TEMPERATURE: 98 F

## 2024-05-29 DIAGNOSIS — M51.36 DDD (DEGENERATIVE DISC DISEASE), LUMBAR: Primary | ICD-10-CM

## 2024-05-29 DIAGNOSIS — G89.4 CHRONIC PAIN DISORDER: ICD-10-CM

## 2024-05-29 DIAGNOSIS — Z02.89 PAIN MANAGEMENT CONTRACT AGREEMENT: ICD-10-CM

## 2024-05-29 DIAGNOSIS — M79.605 PAIN IN BOTH LOWER EXTREMITIES: ICD-10-CM

## 2024-05-29 DIAGNOSIS — M79.604 PAIN IN BOTH LOWER EXTREMITIES: ICD-10-CM

## 2024-05-29 DIAGNOSIS — E61.1 IRON DEFICIENCY: Primary | ICD-10-CM

## 2024-05-29 DIAGNOSIS — I89.0 LYMPHEDEMA: ICD-10-CM

## 2024-05-29 DIAGNOSIS — M54.16 LUMBAR RADICULOPATHY: ICD-10-CM

## 2024-05-29 PROCEDURE — 63600175 PHARM REV CODE 636 W HCPCS: Mod: HCNC | Performed by: NURSE PRACTITIONER

## 2024-05-29 PROCEDURE — G2211 COMPLEX E/M VISIT ADD ON: HCPCS | Mod: S$GLB,,, | Performed by: PHYSICAL MEDICINE & REHABILITATION

## 2024-05-29 PROCEDURE — 99215 OFFICE O/P EST HI 40 MIN: CPT | Mod: PBBFAC,25 | Performed by: PHYSICAL MEDICINE & REHABILITATION

## 2024-05-29 PROCEDURE — 99214 OFFICE O/P EST MOD 30 MIN: CPT | Mod: S$GLB,,, | Performed by: PHYSICAL MEDICINE & REHABILITATION

## 2024-05-29 PROCEDURE — 99999 PR PBB SHADOW E&M-EST. PATIENT-LVL V: CPT | Mod: PBBFAC,,, | Performed by: PHYSICAL MEDICINE & REHABILITATION

## 2024-05-29 PROCEDURE — 96374 THER/PROPH/DIAG INJ IV PUSH: CPT | Mod: HCNC

## 2024-05-29 RX ORDER — SODIUM CHLORIDE 0.9 % (FLUSH) 0.9 %
10 SYRINGE (ML) INJECTION
Status: DISCONTINUED | OUTPATIENT
Start: 2024-05-29 | End: 2024-05-29 | Stop reason: HOSPADM

## 2024-05-29 RX ORDER — DIPHENHYDRAMINE HYDROCHLORIDE 50 MG/ML
50 INJECTION INTRAMUSCULAR; INTRAVENOUS ONCE AS NEEDED
OUTPATIENT
Start: 2024-06-03

## 2024-05-29 RX ORDER — TIZANIDINE 4 MG/1
4 TABLET ORAL 2 TIMES DAILY PRN
Qty: 60 TABLET | Refills: 2 | Status: SHIPPED | OUTPATIENT
Start: 2024-05-29 | End: 2024-08-27

## 2024-05-29 RX ORDER — TRAMADOL HYDROCHLORIDE 50 MG/1
50 TABLET ORAL EVERY 8 HOURS PRN
Qty: 90 TABLET | Refills: 2 | Status: SHIPPED | OUTPATIENT
Start: 2024-05-29 | End: 2024-08-27

## 2024-05-29 RX ORDER — SODIUM CHLORIDE 0.9 % (FLUSH) 0.9 %
10 SYRINGE (ML) INJECTION
OUTPATIENT
Start: 2024-06-03

## 2024-05-29 RX ORDER — HEPARIN 100 UNIT/ML
500 SYRINGE INTRAVENOUS
OUTPATIENT
Start: 2024-06-03

## 2024-05-29 RX ORDER — EPINEPHRINE 0.3 MG/.3ML
0.3 INJECTION SUBCUTANEOUS ONCE AS NEEDED
OUTPATIENT
Start: 2024-06-03

## 2024-05-29 RX ORDER — GABAPENTIN 300 MG/1
300 CAPSULE ORAL 3 TIMES DAILY
Qty: 90 CAPSULE | Refills: 11 | Status: SHIPPED | OUTPATIENT
Start: 2024-05-29 | End: 2025-05-29

## 2024-05-29 RX ADMIN — IRON SUCROSE 200 MG: 20 INJECTION, SOLUTION INTRAVENOUS at 11:05

## 2024-05-29 NOTE — PROGRESS NOTES
Established chronic pain patient note (follow-up visit):    Chief Pain Complaint:  Chief Complaint   Patient presents with    Back Pain    Knee Pain    Leg Pain    Shoulder Pain       Interval History (5/29/2024):    Susu Luther is a 74 y.o. female presents today for chronic pain involving her back and lower extremities.  She also continues to have shoulder pain and arm pain mostly on the right side.  She is currently using gabapentin 4 mg t.i.d., tizanidine 4 mg b.i.d. p.r.n., and has previously used tramadol, Norco, and/or Percocet has been short durations to control her pain.  Current pain intensity is 9/10.  She remembers that the tramadol was very effective in managing her pain on a daily basis when she had it available.      Patient denies night fever/night sweats, urinary incontinence, bowel incontinence, significant weight loss and significant motor weakness.   Patient denies any other complaints or concerns at this time.    Interval HPI 03/01/2024:  Patient Susu Luther presents today for follow-up visit.  Patient is being seen today for review of lumbar MRI and hip x-rays.  She rates her pain today an 8.5/10.  She complains of lower back pain which radiates down the back of the legs down to the feet.  She has had a couple of falls which she feels aggravated her pain even more.  She has been in physical therapy which has helped some.   She also suffers from chronic lymphedema in the bilateral lower extremities and has an appointment in the upcoming couple of weeks to have her legs wrapped.    Interval History (2/2/2024):  Patient  presents today for follow-up visit.  Patient was last seen on 10/18/2023. Patient reports pain as 9/10 today.  She has a h/o lymphedema, currently in therapy for this. Attending three times a week. Utilizes compression garments on both legs.   Patient reports her BP has been elevated for the past 2 weeks. Has reached out to PCP regarding this.   Patient has pain in  her lower back. Associates this to falling in her bathroom in July of last year. States she does not even remember going to the bathroom. HH nurse came and called for assistance. She was admitted after this fall for 5 days.     Currently she has pain in her lower back and legs. She has diffuse leg pain  She also reports recent falls- 1 in January and 2 in December. Ambulates with a Rolator.      History of Present Illness 10/18/23:   Susu Luther is a 74 y.o. female  who is presenting with a chief complaint of right axillary and upper extremity Pain. The patient began experiencing this problem insidiously, and the pain has been gradually worsening over the past 3 month(s). The pain is described as throbbing, cramping, burning, aching and heavy and is located in the right axilla . Pain is intermittent and lasts hours. The  pain is nonradiating. The patient rates her pain a 7 out of ten and interferes with activities of daily living a 7 out of ten. Pain is exacerbated by rasing the right arm, lying on the right side, and is improved by rest. Patient reports no prior trauma, no prior spinal surgery  note, patient has a history of breast cancer    - pertinent negatives: No fever, No chills, No weight loss, No bladder dysfunction, No bowel dysfunction, No saddle anesthesia  - pertinent positives: none    - medications, other therapies tried (physical therapy, injections):     >> NSAIDs, Tylenol, Tramadol, Norco, Percocet and gabapentin    >> Has NOT previously undergone Physical Therapy    >> Has NOT previously undergone spinal injection/s      Imaging / Labs / Studies (reviewed on 5/29/2024):  2/29/2024 Lumbar MRI  FINDINGS:  Alignment: Normal.     Vertebrae: Normal marrow signal. No fracture.     Discs: Severe narrowing of the L5-S1 disc space.  Otherwise normal height and signal.     Cord: Normal.  Conus terminates at L1.     Degenerative findings:     T12-L1: No significant neural foraminal narrowing or spinal  canal stenosis.     L1-L2: Mild facet arthropathy and minimal annular bulge contributing to mild inferior left foraminal narrowing.  No significant spinal canal stenosis.     L2-L3: Mild facet arthropathy contributing to mild bilateral inferior foraminal narrowing.  No significant spinal canal stenosis.     L3-L4: Bilateral facet arthropathy and minimal left foraminal annular bulge contributing to mild bilateral inferior foraminal narrowing.  No significant spinal canal stenosis.     L4-L5: Bilateral hypertrophic facet arthropathy resulting in mild bilateral inferior foraminal narrowing.  No significant spinal canal stenosis.     L5-S1: Posterior endplate ridging and annular bulge.  Bilateral facet arthropathy.  Resultant mild to moderate left and mild right inferior foraminal narrowing.  No significant spinal canal stenosis.     Paraspinal muscles & soft tissues: Unremarkable.     Impression:     Multilevel lumbar spondylosis and degenerative disc disease, most pronounced at L5-S1.    2/2/2024 xray hips  FINDINGS:  The bony pelvis is intact. Both femoral heads are well seated within acetabula.  There is severe axial hip joint space narrowing on the right with prominent osteophyte formation identified.  The hip joint space on the left appears to be relatively well preserved.  No plain film evidence to suggest AVN of either femoral head.  Calcified phleboliths seen projecting over the pelvis.    Results for orders placed during the hospital encounter of 05/18/20    X-Ray Cervical Spine Complete 5 view    Narrative  EXAMINATION:  XR CERVICAL SPINE COMPLETE 5 VIEW    CLINICAL HISTORY:  Exposure to other specified factors, sequela.  Accident, sequela.    FINDINGS:  There is no cervical spine fracture or malalignment.  C5-6 spondylosis with anterior spurring.  Otherwise, unremarkable cervical spine x-ray.    Impression  See above.      Electronically signed by: Naun Jennings  "MD  Date:    05/18/2020  Time:    11:58      Review of Systems:  CONSTITUTIONAL: patient denies any fever, chills, or weight loss  SKIN: patient denies any rash or itching  RESPIRATORY: patient denies having any shortness of breath  GASTROINTESTINAL: patient denies having any diarrhea, constipation, or bowel incontinence  GENITOURINARY: patient denies having any abnormal bladder function    MUSCULOSKELETAL:  - patient complains of the above noted pain/s (see chief pain complaint)    NEUROLOGICAL:   - pain as above  - strength in Upper extremities is intact, BILATERALLY  - sensation in Upper extremities is intact, BILATERALLY  - patient denies any loss of bowel or bladder control      PSYCHIATRIC: patient denies any change in mood    Other:  All other systems reviewed and are negative      Physical Exam:  BP (!) 155/94   Pulse (!) 57   Resp 17   Ht 6' 1" (1.854 m)   Wt (!) 148 kg (326 lb 4.5 oz)   LMP  (LMP Unknown)   BMI 43.05 kg/m²      General: Alert and oriented, in no apparent distress.  Gait: normal gait.  Skin: No rashes, No discoloration, No obvious lesions  HEENT: Normocephalic, atraumatic. Pupils equal and round.  Cardiovascular: Regular rate and rhythm , moderately significant peripheral edema present in the bilateral lower extremities and right upper extremity  Respiratory: Without audible wheezing, without use of accessory muscles of respiration.    Musculoskeletal:  Musculoskeletal - Lumbar Spine:  - Spinal Sensation: Normal   - Pain on flexion of lumbar spine: Present  - Pain on extension of lumbar spine: Present   - TTP over the lumbar facet joints: Present   - TTP over the lumbar paraspinals: Present   - SLR test equivocal bilaterally    Strength:  UE R/L: D: 5/5; B: 5/5; T: 5/5; WF: 5/5; WE: 5/5; IO: 5/5;  LE R/L: HF: 5/5, HE: 5/5, KF: 5/5; KE: 5/5; FE: 5/5; FF: 5/5    Extremity Reflexes: Brisk and symmetric throughout.      Extremity Sensory: Sensation to pinprick and temperature symmetric. " Proprioception intact.      Psych:  Mood and affect is appropriate      Assessment:    Susu Luther is a 74 y.o. year old female who is presenting with     Encounter Diagnoses   Name Primary?    Pain in both lower extremities     Chronic pain disorder     DDD (degenerative disc disease), lumbar Yes    Lymphedema     Lumbar radiculopathy     Pain management contract agreement            Plan:    1. Interventional:   She was scheduled for bilateral L5-S1 TFESI on 05/22/2024; however, her blood pressure has been unable to be adequately controlled for safety of the procedure    Anticoagulation: Eliquis    Consider R IA hip injection     2. Pharmacologic:   We will reduce gabapentin to 300 mg TID - do not recommend increasing dosage due to kidney function   Continue Tizanidine 4 mg Po BID PRN. -We have discussed potential deleterious side effects associated with this medication including  dizziness, drowsiness, dry mouth or tingling sensation in the upper or lower extremities.       An opioid pain contract was completed on 05/29/2024 after having an extensive conversation about chronic opioid use for pain management. We discussed the risks and benefits of opioids.  I discouraged the patient from escalation of opioid use and try to minimize its use to decrease chance of dependence, tolerance, and opioid-based hyperalgesia.  I reviewed the  in great detail and there are no inconsistencies and it is appropriate with patient's history.  There are no signs of aberrant drug behavior.  The opioid risk tool was also completed, low risk.  Pt counseled about the side effects of long term use of opioids including dependence, tolerance, addiction, respiratory depression, somnelence , immune and endocrine dysfunction.  The patient expressed understanding.    Provide tramadol 50 mg Q 8 p.r.n., 90 tablets, with 2 future refills     report:  Reviewed and consistent with medication use as prescribed.        3.  Rehabilitative: Continue home exercises and activities as tolerated    4. Diagnostic:  Reviewed MRI lumbar spine + hip/pelvic x-rays reviewed    5. Follow up:  3 months for medication refill      Visit today included increased complexity associated with the care of the episodic problem of chronic pain which was addressed and continue to manage the longitudinal care of the patient due to the serious and/or complex managed problem(s) listed above.      Barry Edwards MD  Interventional Pain Medicine  [facility] [place]

## 2024-05-29 NOTE — DISCHARGE INSTRUCTIONS
[facility]  [id number] [facility]  [address]  [phone] phone     [phone] fax  Hours of Operation: Monday- Friday 8:00am- 5:00pm  After hours phone  [phone]  Hematology / Oncology Physicians on call      STEPHANIE Lemons Dr., NP Phaon Dunbar, NP Khelsea Conley, FNP    Please call with any concerns regarding your appointment today.

## 2024-05-30 RX ORDER — VALSARTAN 80 MG/1
TABLET ORAL
Qty: 180 TABLET | Refills: 3 | Status: SHIPPED | OUTPATIENT
Start: 2024-05-30 | End: 2024-06-12

## 2024-06-05 ENCOUNTER — LAB VISIT (OUTPATIENT)
Dept: LAB | Facility: HOSPITAL | Age: 74
End: 2024-06-05
Attending: STUDENT IN AN ORGANIZED HEALTH CARE EDUCATION/TRAINING PROGRAM
Payer: MEDICARE

## 2024-06-05 DIAGNOSIS — I50.32 CHRONIC DIASTOLIC CONGESTIVE HEART FAILURE: ICD-10-CM

## 2024-06-05 LAB
ALBUMIN SERPL BCP-MCNC: 3 G/DL (ref 3.5–5.2)
ALP SERPL-CCNC: 120 U/L (ref 55–135)
ALT SERPL W/O P-5'-P-CCNC: 14 U/L (ref 10–44)
ANION GAP SERPL CALC-SCNC: 11 MMOL/L (ref 8–16)
AST SERPL-CCNC: 14 U/L (ref 10–40)
BILIRUB SERPL-MCNC: 0.4 MG/DL (ref 0.1–1)
BNP SERPL-MCNC: 273 PG/ML (ref 0–99)
BUN SERPL-MCNC: 12 MG/DL (ref 8–23)
CALCIUM SERPL-MCNC: 9.7 MG/DL (ref 8.7–10.5)
CHLORIDE SERPL-SCNC: 109 MMOL/L (ref 95–110)
CO2 SERPL-SCNC: 21 MMOL/L (ref 23–29)
CREAT SERPL-MCNC: 1.3 MG/DL (ref 0.5–1.4)
EST. GFR  (NO RACE VARIABLE): 43.7 ML/MIN/1.73 M^2
GLUCOSE SERPL-MCNC: 100 MG/DL (ref 70–110)
POTASSIUM SERPL-SCNC: 4 MMOL/L (ref 3.5–5.1)
PROT SERPL-MCNC: 7 G/DL (ref 6–8.4)
SODIUM SERPL-SCNC: 141 MMOL/L (ref 136–145)

## 2024-06-05 PROCEDURE — 83835 ASSAY OF METANEPHRINES: CPT | Mod: HCNC | Performed by: STUDENT IN AN ORGANIZED HEALTH CARE EDUCATION/TRAINING PROGRAM

## 2024-06-05 PROCEDURE — 82088 ASSAY OF ALDOSTERONE: CPT | Mod: HCNC | Performed by: STUDENT IN AN ORGANIZED HEALTH CARE EDUCATION/TRAINING PROGRAM

## 2024-06-05 PROCEDURE — 83880 ASSAY OF NATRIURETIC PEPTIDE: CPT | Mod: HCNC | Performed by: STUDENT IN AN ORGANIZED HEALTH CARE EDUCATION/TRAINING PROGRAM

## 2024-06-05 PROCEDURE — 82384 ASSAY THREE CATECHOLAMINES: CPT | Mod: HCNC | Performed by: STUDENT IN AN ORGANIZED HEALTH CARE EDUCATION/TRAINING PROGRAM

## 2024-06-05 PROCEDURE — 80053 COMPREHEN METABOLIC PANEL: CPT | Mod: HCNC | Performed by: STUDENT IN AN ORGANIZED HEALTH CARE EDUCATION/TRAINING PROGRAM

## 2024-06-07 ENCOUNTER — PATIENT MESSAGE (OUTPATIENT)
Dept: NEUROLOGY | Facility: CLINIC | Age: 74
End: 2024-06-07
Payer: MEDICARE

## 2024-06-07 ENCOUNTER — TELEPHONE (OUTPATIENT)
Dept: NEUROLOGY | Facility: CLINIC | Age: 74
End: 2024-06-07
Payer: MEDICARE

## 2024-06-07 DIAGNOSIS — G44.229 CHRONIC TENSION-TYPE HEADACHE, NOT INTRACTABLE: ICD-10-CM

## 2024-06-07 DIAGNOSIS — R11.0 NAUSEA: Primary | ICD-10-CM

## 2024-06-07 DIAGNOSIS — I50.32 CHRONIC DIASTOLIC CONGESTIVE HEART FAILURE: Primary | ICD-10-CM

## 2024-06-07 RX ORDER — BUTALBITAL, ACETAMINOPHEN AND CAFFEINE 50; 325; 40 MG/1; MG/1; MG/1
1 TABLET ORAL EVERY 6 HOURS PRN
Qty: 20 TABLET | Refills: 0 | Status: SHIPPED | OUTPATIENT
Start: 2024-06-07 | End: 2024-06-21

## 2024-06-07 RX ORDER — ONDANSETRON 4 MG/1
4 TABLET, ORALLY DISINTEGRATING ORAL EVERY 8 HOURS PRN
Qty: 30 TABLET | Refills: 0 | Status: SHIPPED | OUTPATIENT
Start: 2024-06-07 | End: 2024-07-07

## 2024-06-07 RX ORDER — UBROGEPANT 50 MG/1
50 TABLET ORAL
Qty: 9 TABLET | Refills: 0 | Status: SHIPPED | OUTPATIENT
Start: 2024-06-07 | End: 2024-06-19 | Stop reason: SDUPTHER

## 2024-06-07 NOTE — TELEPHONE ENCOUNTER
"Called Mrs. Luther and spoke with her in regards to her message about "worsening headaches".     The patient reported that she started with a worsening headaches x 2 days ago. She reports a location of bilateral frontal areas, pressure like, 10/10 in intensity, duration of over 24 hours, worse with activity, and better with rest. She has tried OTC NSAIDs without relief. Patient reports associated nausea, trouble sleeping at night, sensitivity to light and sound, and high blood pressure readings of (170's/90's).     Patient reports that she has been compliant with taking prescribed Inderal-LA 60 mg PO Daily for headache prevention and HTN management. She reports that is is not helping to prevent headaches from coming for the last 2 days. She denies experiencing side effects.     Patient reports that for Migraine type headaches in the past she took Fioricet with complete relief. She reports that she ran out of Fioricet and does not have any for headache relief at this time.     She reports that she contacted her PCP and Cardiologist for elevated blood pressure readings who informed her to take an extra dose of Clonidine and to monitor her blood pressure frequently. Also, informed patient to monitor blood pressure frequently and to seek ER evaluation if she experienced symptoms of chest pain, shortness of breath, bilateral or focal weakness, facial drooping, slurred speech, difficulty swallowing or speaking, etc. Also informed her to continue following PCP and Cardiology for management of HTN. Patient verbalized understanding.     Patient's headache symptoms are consistent with Migraine Type Headache.     Prescribed the patient:    Zofran-ODT 4 mg PO Q8H PRN Nausea / Fioricet -40 mg PO Q6H PRN Headaches / Ubrelvy 50 mg PO PRN Migraine    Informed the patient of medication side effects. She verbalized understanding.     Informed patient that Fioricet prescription is for a temporary 2 week supply for acute " migraine relief. Informed her not to take Fioricet and Ubrelvy together at the same time- to alternate therapy. Informed her to continue taking Inderal-LA as prescribed for migraine prevention therapy.

## 2024-06-11 LAB
ALDOST SERPL-MCNC: 9.3 NG/DL
ALDOST/RENIN PLAS-RTO: NORMAL RATIO
RENIN PLAS-CCNC: <0.1 NG/ML/HR

## 2024-06-12 ENCOUNTER — OFFICE VISIT (OUTPATIENT)
Dept: CARDIOLOGY | Facility: CLINIC | Age: 74
End: 2024-06-12
Payer: MEDICARE

## 2024-06-12 ENCOUNTER — HOSPITAL ENCOUNTER (OUTPATIENT)
Dept: CARDIOLOGY | Facility: HOSPITAL | Age: 74
Discharge: HOME | End: 2024-06-12
Attending: STUDENT IN AN ORGANIZED HEALTH CARE EDUCATION/TRAINING PROGRAM
Payer: MEDICARE

## 2024-06-12 VITALS
BODY MASS INDEX: 41.04 KG/M2 | WEIGHT: 293 LBS | SYSTOLIC BLOOD PRESSURE: 156 MMHG | DIASTOLIC BLOOD PRESSURE: 88 MMHG | HEART RATE: 86 BPM | OXYGEN SATURATION: 99 %

## 2024-06-12 DIAGNOSIS — G47.33 OSA (OBSTRUCTIVE SLEEP APNEA): ICD-10-CM

## 2024-06-12 DIAGNOSIS — I50.32 CHRONIC DIASTOLIC CONGESTIVE HEART FAILURE: Primary | ICD-10-CM

## 2024-06-12 DIAGNOSIS — I10 HYPERTENSION, UNSPECIFIED TYPE: ICD-10-CM

## 2024-06-12 DIAGNOSIS — Z17.0 MALIGNANT NEOPLASM OF UPPER-OUTER QUADRANT OF RIGHT BREAST IN FEMALE, ESTROGEN RECEPTOR POSITIVE: ICD-10-CM

## 2024-06-12 DIAGNOSIS — E66.01 CLASS 3 SEVERE OBESITY WITH SERIOUS COMORBIDITY AND BODY MASS INDEX (BMI) OF 40.0 TO 44.9 IN ADULT, UNSPECIFIED OBESITY TYPE: ICD-10-CM

## 2024-06-12 DIAGNOSIS — C50.411 MALIGNANT NEOPLASM OF UPPER-OUTER QUADRANT OF RIGHT BREAST IN FEMALE, ESTROGEN RECEPTOR POSITIVE: ICD-10-CM

## 2024-06-12 DIAGNOSIS — E78.5 HYPERLIPIDEMIA, UNSPECIFIED HYPERLIPIDEMIA TYPE: ICD-10-CM

## 2024-06-12 DIAGNOSIS — Z86.711 HISTORY OF PULMONARY EMBOLISM: ICD-10-CM

## 2024-06-12 DIAGNOSIS — R53.1 RIGHT SIDED WEAKNESS: ICD-10-CM

## 2024-06-12 DIAGNOSIS — Z86.73 HISTORY OF CVA (CEREBROVASCULAR ACCIDENT): ICD-10-CM

## 2024-06-12 DIAGNOSIS — F33.1 MAJOR DEPRESSIVE DISORDER, RECURRENT EPISODE, MODERATE WITH ANXIOUS DISTRESS: ICD-10-CM

## 2024-06-12 DIAGNOSIS — I71.40 ABDOMINAL AORTIC ANEURYSM (AAA) WITHOUT RUPTURE, UNSPECIFIED PART: ICD-10-CM

## 2024-06-12 DIAGNOSIS — M79.2 NEUROPATHIC PAIN: ICD-10-CM

## 2024-06-12 DIAGNOSIS — I50.32 CHRONIC DIASTOLIC CONGESTIVE HEART FAILURE: ICD-10-CM

## 2024-06-12 DIAGNOSIS — R06.02 SOB (SHORTNESS OF BREATH): ICD-10-CM

## 2024-06-12 DIAGNOSIS — E66.01 MORBID OBESITY WITH BMI OF 45.0-49.9, ADULT: ICD-10-CM

## 2024-06-12 DIAGNOSIS — N18.31 CHRONIC KIDNEY DISEASE, STAGE 3A: ICD-10-CM

## 2024-06-12 DIAGNOSIS — R60.9 EDEMA, UNSPECIFIED TYPE: ICD-10-CM

## 2024-06-12 DIAGNOSIS — M79.606 PAIN OF LOWER EXTREMITY, UNSPECIFIED LATERALITY: ICD-10-CM

## 2024-06-12 LAB
OHS QRS DURATION: 82 MS
OHS QTC CALCULATION: 427 MS

## 2024-06-12 PROCEDURE — 93010 ELECTROCARDIOGRAM REPORT: CPT | Mod: HCNC,,, | Performed by: INTERNAL MEDICINE

## 2024-06-12 PROCEDURE — 93005 ELECTROCARDIOGRAM TRACING: CPT | Mod: HCNC

## 2024-06-12 PROCEDURE — 99999 PR PBB SHADOW E&M-EST. PATIENT-LVL III: CPT | Mod: PBBFAC,HCNC,, | Performed by: STUDENT IN AN ORGANIZED HEALTH CARE EDUCATION/TRAINING PROGRAM

## 2024-06-12 RX ORDER — VALSARTAN 80 MG/1
160 TABLET ORAL 2 TIMES DAILY
Qty: 180 TABLET | Refills: 3 | Status: SHIPPED | OUTPATIENT
Start: 2024-06-12

## 2024-06-12 RX ORDER — DAPAGLIFLOZIN 10 MG/1
10 TABLET, FILM COATED ORAL DAILY
Qty: 90 TABLET | Refills: 3 | Status: SHIPPED | OUTPATIENT
Start: 2024-06-12

## 2024-06-12 NOTE — PROGRESS NOTES
Subjective:   Patient ID:  Susu Luther is a 74 y.o. female who presents for follow up of Palpitations, Shortness of Breath, Fatigue (Pain in left arm for 1 week.), and Chest Pain      12/1/20  69 yo female, care establish. Prior cardiologist Dr ackerman   [facility] HTN, CVA (2009), Right breast CA, Lumpectomy 3/2019, h/o PE off OAC 2 yrs ago.  AAA, s/p transcutaneous patch by Dr. Bonds, obesity knee OA imbalanced walker dependent  C/o SOB after walking few steps and dizziness. Had vision issue 1 month ago due to uncontrolled HTN  No chest pain  Sleeps with 2 pillows  Decent appetites  Leg calf pain worse at night  No smoking/drinking  ekg today NSR LVH.    ECH normal EF, grade II DD, LAE and PAP 56 mmHG   Chest CTA negative for PE  BP high    A1c controlled    S/p Open subpectoral lymph node biopsy on the right on 12/02/2020 by Dr. Starks  Still right chest, under arm and shoudler supersensitive pain      Shortness of Breath  Associated symptoms include chest pain and leg swelling. Pertinent negatives include no abdominal pain, claudication, fever, headaches, neck pain, orthopnea, PND, rash, sputum production, syncope, vomiting or wheezing.   Chest Pain   Associated symptoms include palpitations and shortness of breath. Pertinent negatives include no abdominal pain, back pain, claudication, cough, diaphoresis, dizziness, fever, headaches, irregular heartbeat, malaise/fatigue, nausea, near-syncope, numbness, orthopnea, PND, sputum production, syncope, vomiting or weakness.   Her past medical history is significant for CHF.   Pertinent negatives for past medical history include no seizures.   Congestive Heart Failure  Associated symptoms include chest pain, fatigue, palpitations and shortness of breath. Pertinent negatives include no abdominal pain, claudication or near-syncope.   Palpitations   Associated symptoms include chest pain and shortness of breath. Pertinent negatives include no  coughing, diaphoresis, dizziness, fever, irregular heartbeat, malaise/fatigue, nausea, near-syncope, numbness, syncope, vomiting or weakness.   Fatigue  Associated symptoms include chest pain and fatigue. Pertinent negatives include no abdominal pain, coughing, diaphoresis, fever, headaches, joint swelling, nausea, neck pain, numbness, rash, vomiting or weakness.     2/28/22  Patient was admitted to [facility] for shortness of breath.  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  Started on heparin drip in transition to oral anticoagulation, now on Eliquis.  Recurrent PE.  On Femara. Has another lump in breast on right side, recently seen on PET scan.   Due to TANNER, was told to stop hctz, telmisartan.  She has ran out of clonidine. Does not take the ASA, hydralazine, amlodipine.   Blood pressure normotensive.  Reports severe headaches.  Has been out of Fioricet.  Echocardiogram with normal EF  Reports shortness of breath, chest heaviness mostly right-sided.      3/14/22  Still having SOB due to PE.  No bleeding issues while on eliquis.  Chest pain is substernal to right sided.   Lasix doesn't seem to help.   A reports intermittent leg pain right > left.  DVT ultrasound in-hospital with no evidence of DVT.  Following Hematology-Oncology.  Patient does not want surgery if needed for possible breast cancer.  Denies syncope, fever, chills.         4/18/22  Comes in with daughter who lives in [place].  Concerned that she may oxygen issues and may need home oxygen. O2 98%  Reports LE swelling and SOB  Reports chest pain comes on with SOB, did not have chest pain prior to PE  Has not been on lasix, has been held  Reports balancing issues- can do PT once symptoms improve   Denies syncope, fever, chills.       5/16/22  Has been feeling weak last couple days  Feels chest tightness with walking, also CURIEL- feels worse than before. 97% O2 in clinic   Reports dizziness after taking  medications   BP low today   Says recurrent cancer may be spreading   No bleeding on eliquis   Reports orthopnea, PND.  Not feeling well- Does not want to the hospital     Denies syncope.      6/13/22  Not feeling well today  Reports chest pain and SOB worsening over the last 2 weeks   Ddimer trending, downTroponin neg and BNP nl on 5/16/22  EKG today without significant abnormalities   Reports recent diagnosis of breast cancer is progressing  Reports right-sided arm weakness  Patient has been increasingly stressed    Denies syncope, lower extremity swelling.      7/6/2022   went to emergency room 6/13/2022, was worked up for stroke  MRI brain negative  Repeat CTA chest without PE, right-sided mass 6 cm, stable  All blood pressure medications.  Due to hypotension  Started taking Lasix today  Has significant lower extremity pain bilateral, reports blue feet at times      8/9/22  Virtual visit  Ambulates with walker   Had a fall recently due to syncopal episode, went to urgent care, negative workup per patient  Syncopal event occurred while standing up  Last visit Lasix was increased, patient reports increased thirst and water intake  Has also been taking carvedilol, reports low blood pressure  Chest pain worsened after syncopal event, has bruising on her body      9/7/22  Reports leg swelling, left  Has been taking eliquis also   Restarted taking lasix 2 weeks ago  Recently started on lyrica   Still wearing cardiac monitor  Still having SOB and chest discomfort  U/S arterial w/o significant stenosis   BP elevated, high at home  Lost 8 lbs since 9/1      10/12/22  Virtual visit  Doing well, still getting chest pain   Still has shortness of breath but has improved   Was able to go to a football game without any issues   DVT ultrasound without thrombus   Has been treated for gout, improvement of toe pain  Has been having intermittent blood pressure elevated readings at home        2/20/23  Stress test normal   BP  elevated now  Has been more tired  Drinking lots of water  Chest pain has improved with imdur  SOB stable   Has chronic pain and chronic fatigue         3/21/23  Patient was recently admitted to the hospital for right arm weakness, TANNER, chest pain, shortness of breath   MRI/CT scan of the brain were unremarkable   CTA did not show PE   Had elevated creatinine, renal ultrasound did not show arterial stenosis  BP noted to be significantly elevated   Blood pressure meds were changed, patient only taking Entresto and amlodipine   Coreg was stopped  Reports right-sided breast lump/mass currently being investigated    Today, reports still having shortness of breath  Waiting to have ultrasound done for right breast lump  Blood pressure stable today      4/25/23  Virtual visit   Continues to have intermittent chest pain, shortness of breath   Has not fallen since last visit   Blood pressure has been stable   Metanephrine levels are elevated  No bleeding on Eliquis      9/18/23  Virtual visit   Was admitted to [facility] 7/23 for chest pain and recurrent falls and worsening swelling in her legs  Lasix was switched to torsemide  Continues to have leg pain and swelling  Has been seeing Maria Isabel in [facility] clinic  Currently taking torsemide 20 mg b.i.d.  No improvement in her leg pain and leg swelling since discharge from the hospital   Recent DVT ultrasound of right leg with no clot  Has been wearing compression stockings but difficult recently due to leg swelling      9/25/23  Virtual visit   Took metolazone once  Taking torsemide 40 mg b.i.d.  Swelling improving per patient but not a lot pain   CKD mildly worsening on recent labs        9/29/23  Virtual visit  Worsening renal function with taking increased dose of torsemide and metolazone x 2 doses   Swelling has improved and patient now able to walk w/o pain  Was told to hold diuretics for 1 week and have f/u labs  No SOB  Has not gotten contacted by lymphedema clinic at  [facility]      10/25/23  Swelling has improved in legs, still having pain with ambulation   Insurance did not cover [facility]hed[facility] clinic [facility] or [facility]  Has shortness of breath intermittent      1/10/24  Going to lymphedema clinic in [facility] improving, now able to walk on them  Fell x2 1 month ago   Stopped amlodipine  Fluctuations in BP, can be high at times  Wearing compression stockings  BP stable   Says gabapentin may be causing weight gain  Weight going up significantly   SOB stable   Has been eating out- has been salty       3/20/24   Virtual visit  Has been having significant lower extremity swelling  Reports someone took her torsemide  Blood pressure has been significantly elevated   Has been short of breath  Getting iron infusions and seen pain management  Taking clonidine p.r.n.   Compliant with all medications  Potassium level high limit of normal recently      5/22/24  Granddaughter just graduated in [place] and traveled to [place]- had headache, BP was high - was also having SOb  Wants to travel internationally for wedding anniversary- 52 years   BP elevated  Still having SOB   Could not check weight today- the scale not working   Was getting legs wrapped 3 x week- not helping edema   Wearing compression stockings   Not taking entresto- ran out  Started on valsartan by [facility]        6/12/24  Has been having more chest pain since lat visit, worsen than normal chest pain  Cannot lay down due to SOB  Worsened SOB with exertion   Did not get fraxiga - did not receive in mail yet  Has been sweating - chronic for >1 year, gotten worse   Bp high today  Reports intermittent left arm tingling      EKG 6/12/24 NSR, PVCs, LVH  Ekg 5/22/24 NSR, PVCs, LAD, LVH, prolonged  ms   Ekg 7/12/23 NSR, LAFB, LVH  EKG 2/20/23 NSR, PVCs, LAD, minimal LVH  EKG 6/13/22 NSR, LAD, LVH, 93 bpm, qtc 455 ms  EKG 5/16/22 SB, incomplete RBBB, LVH, septal infarct, qtc 422 ms         Echo 2/28/22  The left ventricle is normal in  size with concentric hypertrophy and normal systolic function.  The estimated ejection fraction is 60%.  Normal left ventricular diastolic function.  Normal right ventricular size with normal right ventricular systolic function.  Mild to moderate tricuspid regurgitation.  Normal central venous pressure (3 mmHg).  The estimated PA systolic pressure is 36 mmHg.       Echo 2019  CONCLUSIONS     1 - Mild left atrial enlargement.     2 - Concentric hypertrophy.     3 - No wall motion abnormalities.     4 - Normal left ventricular systolic function (EF 60-65%).     5 - Impaired LV relaxation, elevated LAP (grade 2 diastolic dysfunction).     6 - Normal right ventricular systolic function .     7 - The estimated PA systolic pressure is 36 mmHg.     8 - Mild tricuspid regurgitation.        Past Medical History:   Diagnosis Date    Cataract     Bilateral    Chronic diastolic congestive heart failure 08/25/2020    Dizziness 03/12/2023    Encounter for blood transfusion     History of repair of aneurysm of abdominal aorta using endovascular stent graft     DR Bonds     of psychiatric care     Hypertension     Major depressive disorder, single episode, moderate with anxious distress 01/14/2020    Malignant neoplasm of upper-outer quadrant of right breast in female, estrogen receptor positive 03/14/2019    radiation    Psychiatric problem     Sleep apnea     Sleep difficulties     Stroke 2009    no residual defect    Therapy        Past Surgical History:   Procedure Laterality Date    APPENDECTOMY      AXILLARY NODE DISSECTION Right 12/02/2020    Procedure: LYMPHADENECTOMY, AXILLARY;  Surgeon: Artemio Starks MD;  Location: [facility] OR;  Service: General;  Laterality: Right;    BIOPSY OF AXILLARY NODE Right 03/02/2021    Procedure: BIOPSY, LYMPH NODE, AXILLARY;  Surgeon: Artemio Sutton MD;  Location: [facility] OR [address] [place] [address];  Service: General;  Laterality: Right;    BREAST BIOPSY      2019    BREAST LUMPECTOMY Right     2019      SECTION      x 1    OOPHORECTOMY      right     SENTINEL LYMPH NODE BIOPSY Right 2019    Procedure: BIOPSY, LYMPH NODE, SENTINEL;  Surgeon: Artemio Starks MD;  Location: [facility];  Service: General;  Laterality: Right;    splenic artery aneurysm repair      TONSILLECTOMY         Social History     Tobacco Use    Smoking status: Never     Passive exposure: Past    Smokeless tobacco: Never   Substance Use Topics    Alcohol use: No    Drug use: No       Family History   Problem Relation Name Age of Onset    Diabetes Maternal Grandmother Susu Jennings     Diabetes Maternal Grandfather N/A     Diabetes Mother Balbina Tena     Hypertension Mother Balbina Tena     Sickle cell anemia Daughter      Breast cancer Neg Hx      Colon cancer Neg Hx      Ovarian cancer Neg Hx           Review of Systems   Constitutional: Positive for fatigue. Negative for decreased appetite, diaphoresis, fever, malaise/fatigue and night sweats.   HENT:  Negative for nosebleeds.    Eyes:  Negative for blurred vision and double vision.   Cardiovascular:  Positive for chest pain, leg swelling and palpitations. Negative for claudication, irregular heartbeat, near-syncope, orthopnea, paroxysmal nocturnal dyspnea and syncope.   Respiratory:  Positive for shortness of breath. Negative for cough, sleep disturbances due to breathing, snoring, sputum production and wheezing.    Endocrine: Negative for cold intolerance and polyuria.   Hematologic/Lymphatic: Does not bruise/bleed easily.   Skin:  Negative for rash.   Musculoskeletal:  Negative for back pain, falls, joint pain, joint swelling and neck pain.        Right chest breast and shoulder pain   Gastrointestinal:  Negative for abdominal pain, heartburn, nausea and vomiting.   Genitourinary:  Negative for dysuria, frequency and hematuria.   Neurological:  Negative for difficulty with concentration, dizziness, focal weakness, headaches, light-headedness, numbness, seizures and  weakness.   Psychiatric/Behavioral:  Negative for depression, memory loss and substance abuse. The patient does not have insomnia.    Allergic/Immunologic: Negative for HIV exposure and hives.     Vitals:    06/12/24 1139   BP: (!) 156/88   BP Location: Left forearm   Patient Position: Sitting   BP Method: Medium (Manual)   Pulse: 86   SpO2: 99%   Weight: (!) 141.1 kg (311 lb 1.1 oz)               Objective:   Physical Exam  Constitutional:       Comments: Mild distress.    HENT:      Head: Normocephalic.   Eyes:      Pupils: Pupils are equal, round, and reactive to light.   Neck:      Thyroid: No thyromegaly.      Vascular: Normal carotid pulses. No carotid bruit or JVD.   Cardiovascular:      Rate and Rhythm: Normal rate and regular rhythm. No extrasystoles are present.     Chest Wall: PMI is not displaced.      Pulses: Normal pulses.      Heart sounds: Normal heart sounds. No murmur heard.     No gallop. No S3 sounds.   Pulmonary:      Effort: No respiratory distress.      Breath sounds: Normal breath sounds. No stridor.   Abdominal:      General: Bowel sounds are normal.      Palpations: Abdomen is soft.      Tenderness: There is no abdominal tenderness. There is no rebound.   Musculoskeletal:         General: Normal range of motion.      Comments: Tender to palpitation   Skin:     Findings: No rash.   Neurological:      Mental Status: She is alert and oriented to person, place, and time.   Psychiatric:         Behavior: Behavior normal.         Lab Results   Component Value Date    CHOL 197 01/18/2024    CHOL 234 (H) 02/28/2023    CHOL 229 (H) 04/18/2022     Lab Results   Component Value Date    HDL 50 01/18/2024    HDL 44 02/28/2023    HDL 45 04/18/2022     Lab Results   Component Value Date    LDLCALC 120.4 01/18/2024    LDLCALC 151.8 02/28/2023    LDLCALC 150.4 04/18/2022     Lab Results   Component Value Date    TRIG 133 01/18/2024    TRIG 191 (H) 02/28/2023    TRIG 168 (H) 04/18/2022     Lab Results    Component Value Date    CHOLHDL 25.4 01/18/2024    CHOLHDL 18.8 (L) 02/28/2023    CHOLHDL 19.7 (L) 04/18/2022       Chemistry        Component Value Date/Time     06/05/2024 1150    K 4.0 06/05/2024 1150     06/05/2024 1150    CO2 21 (L) 06/05/2024 1150    BUN 12 06/05/2024 1150    CREATININE 1.3 06/05/2024 1150     06/05/2024 1150        Component Value Date/Time    CALCIUM 9.7 06/05/2024 1150    ALKPHOS 120 06/05/2024 1150    AST 14 06/05/2024 1150    ALT 14 06/05/2024 1150    BILITOT 0.4 06/05/2024 1150    ESTGFRAFRICA 37 (A) 07/06/2022 1312    EGFRNONAA 32 (A) 07/06/2022 1312          Lab Results   Component Value Date    HGBA1C 5.9 (H) 05/18/2019     Lab Results   Component Value Date    TSH 1.691 03/10/2023     Lab Results   Component Value Date    INR 1.1 03/11/2023    INR 0.9 02/15/2022    INR 1.0 11/10/2020     Lab Results   Component Value Date    WBC 7.10 04/15/2024    HGB 13.4 04/15/2024    HCT 43.9 04/15/2024    MCV 84 04/15/2024     04/15/2024     BMP  Sodium   Date Value Ref Range Status   06/05/2024 141 136 - 145 mmol/L Final     Potassium   Date Value Ref Range Status   06/05/2024 4.0 3.5 - 5.1 mmol/L Final     Chloride   Date Value Ref Range Status   06/05/2024 109 95 - 110 mmol/L Final     CO2   Date Value Ref Range Status   06/05/2024 21 (L) 23 - 29 mmol/L Final     BUN   Date Value Ref Range Status   06/05/2024 12 8 - 23 mg/dL Final     Creatinine   Date Value Ref Range Status   06/05/2024 1.3 0.5 - 1.4 mg/dL Final     Calcium   Date Value Ref Range Status   06/05/2024 9.7 8.7 - 10.5 mg/dL Final     Anion Gap   Date Value Ref Range Status   06/05/2024 11 8 - 16 mmol/L Final     eGFR if    Date Value Ref Range Status   07/06/2022 37 (A) >60 mL/min/1.73 m^2 Final     eGFR if non    Date Value Ref Range Status   07/06/2022 32 (A) >60 mL/min/1.73 m^2 Final     Comment:     Calculation used to obtain the estimated glomerular filtration  rate  (eGFR) is the CKD-EPI equation.        BNP  @LABRCNTIP(BNP,BNPTRIAGEBLO)@  @LABRCNTIP(troponini)@  CrCl cannot be calculated (Patient's most recent lab result is older than the maximum 7 days allowed.).  No results found in the last 24 hours.  No results found in the last 24 hours.  No results found in the last 24 hours.    Assessment:      1. Chronic diastolic congestive heart failure    2. Class 3 severe obesity with serious comorbidity and body mass index (BMI) of 40.0 to 44.9 in adult, unspecified obesity type    3. Hypertension, unspecified type    4. NILS (obstructive sleep apnea)    5. Malignant neoplasm of upper-outer quadrant of right breast in female, estrogen receptor positive    6. History of pulmonary embolism    7. Major depressive disorder, recurrent episode, moderate with anxious distress    8. Neuropathic pain    9. Chronic kidney disease, stage 3a    10. Pain of lower extremity, unspecified laterality    11. Abdominal aortic aneurysm (AAA) without rupture, unspecified part    12. Edema, unspecified type    13. History of CVA (cerebrovascular accident)    14. Right sided weakness    15. SOB (shortness of breath)    16. Hyperlipidemia, unspecified hyperlipidemia type    17. Morbid obesity with BMI of 45.0-49.9, adult          Plan:     Left leg swelling/lymphedema- chronic  Continue torsemide 40 mg daily, holding metolazone for now  Waiting for farxiga - will re-prescribe  Venous ultrasound 10/22 and 9/23 without DVT  Seeing lymphedema clinic    Diastolic heart failure  Echo 3/23 with normal EF, mild-mod TR  Renal function mild worse, BNP up at 273    Hypertension  Elevated  continue Coreg 6.25 b.i.d, hydralazine 100 mg t.i.d.  Increase valsartan to 160 mg bid  Clonidine prn for BP >170 or >110  Renal artery U/S 3/23, no stenosis   Check metanephrines- pending     Chest pain/shortness of breath- worsening   CTA 3/23 did not show PE   Stress test 1/23 normal stress test   Chest pain reproducible- not  cardiac in nature  No LE swelling today to think CHF/fluid overload  Obtain repeat CMP, BNP, trop    Syncope- resolved   14 day monitor 8/22- 7.27% PACs, essentially asymptomatic    History of right breast cancer  Follows with Hematology-Oncology    Right leg pain-stable  DVT ultrasound 10/22 without evidence of DVT   Normal ABIs 3/22  Arterial ultrasound 8/22 without significant stenosis    Recurrent pulmonary embolus   Recurrent PE as of 2/22  Continue OAC    History of CVA  Carotid ultrasound 6/22 no significant stenosis, MRI brain 6/22 no abnormality  Currently not on aspirin as she is taking Eliquis  Continue statin    HLD  ->120 as of 1/24  Continue statin    AAA s/p transcutaneous patch  Stable    Morbid obesity, BMI > 40  Low-salt, low-fat diet  Exercise as tolerated      All labs and cardiac procedures reviewed.      Return to clinic 6 weeks with Rita, 2-3 months with analy Hobbs MD  Cardiology Staff

## 2024-06-16 LAB
METANEPH FREE SERPL-MCNC: 78 PG/ML
METANEPHS SERPL-MCNC: 366 PG/ML
NORMETANEPH FREE SERPL-MCNC: 288 PG/ML

## 2024-06-17 DIAGNOSIS — I10 HYPERTENSION, UNSPECIFIED TYPE: Primary | ICD-10-CM

## 2024-06-17 NOTE — PROGRESS NOTES
Call patient  Recent labs to see why her BP may be high are elevated  We are still waiting on another lab to result  But I will have her do a urine test to confirm is this is really elevated  Schedule urine metanephrine test

## 2024-06-18 ENCOUNTER — TELEPHONE (OUTPATIENT)
Dept: NEUROLOGY | Facility: CLINIC | Age: 74
End: 2024-06-18
Payer: MEDICARE

## 2024-06-18 NOTE — TELEPHONE ENCOUNTER
Called [employer]. Spoke with rep who was confirming the pt Dx. I confirmed with him that it is chronic tension-type headaches, not intractable. He verbalized understanding and stated a decision would be made in 24-72 hours.

## 2024-06-18 NOTE — TELEPHONE ENCOUNTER
----- Message from Eneidna Keller sent at 6/18/2024 12:20 PM CDT -----  Regarding: meds  Name of Who is Calling:Gary         What is the request in detail: Requesting callback in regards to clinical questions for pt           Can the clinic reply by [facility]: no         What [facility] to [facility] Back [facility][phone] ref [id number]

## 2024-06-19 ENCOUNTER — OFFICE VISIT (OUTPATIENT)
Dept: INTERNAL MEDICINE | Facility: CLINIC | Age: 74
End: 2024-06-19
Payer: MEDICARE

## 2024-06-19 VITALS
TEMPERATURE: 98 F | OXYGEN SATURATION: 95 % | HEART RATE: 86 BPM | WEIGHT: 293 LBS | BODY MASS INDEX: 41.49 KG/M2 | SYSTOLIC BLOOD PRESSURE: 150 MMHG | DIASTOLIC BLOOD PRESSURE: 88 MMHG

## 2024-06-19 DIAGNOSIS — I50.32 CHRONIC DIASTOLIC CONGESTIVE HEART FAILURE: ICD-10-CM

## 2024-06-19 DIAGNOSIS — I10 PRIMARY HYPERTENSION: Primary | ICD-10-CM

## 2024-06-19 DIAGNOSIS — F54 PSYCHOLOGICAL FACTORS AFFECTING MEDICAL CONDITION: ICD-10-CM

## 2024-06-19 DIAGNOSIS — G44.229 CHRONIC TENSION-TYPE HEADACHE, NOT INTRACTABLE: ICD-10-CM

## 2024-06-19 PROCEDURE — 3077F SYST BP >= 140 MM HG: CPT | Mod: HCNC,CPTII,S$GLB, | Performed by: FAMILY MEDICINE

## 2024-06-19 PROCEDURE — 3079F DIAST BP 80-89 MM HG: CPT | Mod: HCNC,CPTII,S$GLB, | Performed by: FAMILY MEDICINE

## 2024-06-19 PROCEDURE — 1101F PT FALLS ASSESS-DOCD LE1/YR: CPT | Mod: HCNC,CPTII,S$GLB, | Performed by: FAMILY MEDICINE

## 2024-06-19 PROCEDURE — 99214 OFFICE O/P EST MOD 30 MIN: CPT | Mod: HCNC,S$GLB,, | Performed by: FAMILY MEDICINE

## 2024-06-19 PROCEDURE — 1125F AMNT PAIN NOTED PAIN PRSNT: CPT | Mod: HCNC,CPTII,S$GLB, | Performed by: FAMILY MEDICINE

## 2024-06-19 PROCEDURE — 1160F RVW MEDS BY RX/DR IN RCRD: CPT | Mod: HCNC,CPTII,S$GLB, | Performed by: FAMILY MEDICINE

## 2024-06-19 PROCEDURE — 3288F FALL RISK ASSESSMENT DOCD: CPT | Mod: HCNC,CPTII,S$GLB, | Performed by: FAMILY MEDICINE

## 2024-06-19 PROCEDURE — 4010F ACE/ARB THERAPY RXD/TAKEN: CPT | Mod: HCNC,CPTII,S$GLB, | Performed by: FAMILY MEDICINE

## 2024-06-19 PROCEDURE — 1159F MED LIST DOCD IN RCRD: CPT | Mod: HCNC,CPTII,S$GLB, | Performed by: FAMILY MEDICINE

## 2024-06-19 PROCEDURE — 3066F NEPHROPATHY DOC TX: CPT | Mod: HCNC,CPTII,S$GLB, | Performed by: FAMILY MEDICINE

## 2024-06-19 PROCEDURE — 1157F ADVNC CARE PLAN IN RCRD: CPT | Mod: HCNC,CPTII,S$GLB, | Performed by: FAMILY MEDICINE

## 2024-06-19 PROCEDURE — 3008F BODY MASS INDEX DOCD: CPT | Mod: HCNC,CPTII,S$GLB, | Performed by: FAMILY MEDICINE

## 2024-06-19 PROCEDURE — 99999 PR PBB SHADOW E&M-EST. PATIENT-LVL V: CPT | Mod: PBBFAC,HCNC,, | Performed by: FAMILY MEDICINE

## 2024-06-19 RX ORDER — UBROGEPANT 50 MG/1
50 TABLET ORAL
Qty: 9 TABLET | Refills: 0 | Status: SHIPPED | OUTPATIENT
Start: 2024-06-19 | End: 2024-07-19

## 2024-06-19 RX ORDER — SACUBITRIL AND VALSARTAN 24; 26 MG/1; MG/1
1 TABLET, FILM COATED ORAL 2 TIMES DAILY
Qty: 120 TABLET | Refills: 2 | Status: SHIPPED | OUTPATIENT
Start: 2024-06-19

## 2024-06-19 NOTE — PROGRESS NOTES
Subjective:       Patient ID: Susu Luther is a 74 y.o. female.    Chief Complaint: Dizziness and Breast Pain    Self-discharge from occupational therapy, no longer getting lymphedema wraps   Stopped infusion for iron due to blood pressure issues  Recently saw cardiology last week where her blood pressure medication was adjusted again but has not noticed significant improvement yet   2-3 weeks ago noted large lump in right breast, was advised to await next mammogram for 6 months  Tried inderal/propanolol, not covered by pharmacy, topamax caused headaches   Really unsure about her overall health and frustruated because she feels like she seeing multiple specialist without any for sure improvement      Hypertension  This is a recurrent problem. The current episode started more than 1 year ago. The problem is unchanged. The problem is resistant. Associated symptoms include anxiety, blurred vision, headaches, malaise/fatigue, orthopnea, peripheral edema, PND, shortness of breath and sweats. Pertinent negatives include no chest pain, neck pain or palpitations. There are no associated agents to hypertension. Risk factors for coronary artery disease include dyslipidemia, family history, obesity, post-menopausal state and stress. The current treatment provides mild improvement. Compliance problems include exercise.      Review of Systems   Constitutional:  Positive for malaise/fatigue.   Eyes:  Positive for blurred vision.   Respiratory:  Positive for shortness of breath.    Cardiovascular:  Positive for orthopnea and PND. Negative for chest pain and palpitations.   Musculoskeletal:  Negative for neck pain.   Neurological:  Positive for dizziness and headaches.       Objective:      Physical Exam  Vitals reviewed.   Constitutional:       Appearance: Normal appearance. She is obese.   HENT:      Head: Normocephalic and atraumatic.   Eyes:      General: No scleral icterus.  Cardiovascular:      Rate and Rhythm:  Normal rate.   Pulmonary:      Effort: Pulmonary effort is normal. No respiratory distress.   Neurological:      General: No focal deficit present.      Mental Status: She is alert.   Psychiatric:         Mood and Affect: Mood normal.         Behavior: Behavior normal.         Assessment:       1. Primary hypertension    2. Chronic tension-type headache, not intractable    3. Chronic diastolic congestive heart failure        Plan:   1. Primary hypertension  Comments:  Chronic, uncontrolled, started on new regimen last week    2. Chronic tension-type headache, not intractable  -     ubrogepant (UBRELVY) 50 mg tablet; Take 1 tablet (50 mg total) by mouth as needed for Migraine. If symptoms persist or return, may repeat dose after 2 hours. Maximum: 200 mg per 24 hours  Dispense: 9 tablet; Refill: 0    3. Chronic diastolic congestive heart failure  -     sacubitriL-valsartan (ENTRESTO) 24-26 mg per tablet; Take 1 tablet by mouth 2 (two) times daily.  Dispense: 120 tablet; Refill: 2       Future Appointments   Date Time Provider Department Center   6/25/2024 11:30 AM Matthew Reyes NP [facility] NEURO [facility]   6/26/2024  3:00 PM Rubi Littlejohn MD [facility] IM [place]   7/11/2024  2:45 PM Raul Boyd OD [facility] OPHTHAL  [facility]   7/15/2024 10:35 AM LABORATORY, [facility] LAB [facility]   7/18/2024  2:00 PM Omaira Bingham NP [facility] HEM ONC [facility]   7/29/2024 11:30 AM Rita Agrawal [facility] CARDIO [facility]   9/4/2024 12:45 PM Barry Edwards MD [facility] IN PN  Medical [facility]         Rubi Littlejohn MD  Family Medicine

## 2024-06-19 NOTE — TELEPHONE ENCOUNTER
Requested Prescriptions     Pending Prescriptions Disp Refills    ALPRAZolam (XANAX) 0.5 MG tablet 60 tablet 0     Sig: Take 1 tablet (0.5 mg total) by mouth 2 (two) times daily as needed for Anxiety.

## 2024-06-22 RX ORDER — ALPRAZOLAM 0.5 MG/1
0.5 TABLET ORAL 2 TIMES DAILY PRN
Qty: 60 TABLET | Refills: 0 | Status: SHIPPED | OUTPATIENT
Start: 2024-06-22 | End: 2024-07-22

## 2024-06-23 DIAGNOSIS — G44.229 CHRONIC TENSION-TYPE HEADACHE, NOT INTRACTABLE: ICD-10-CM

## 2024-06-23 DIAGNOSIS — I10 HYPERTENSION, UNSPECIFIED TYPE: ICD-10-CM

## 2024-06-24 DIAGNOSIS — I10 HYPERTENSION, UNSPECIFIED TYPE: ICD-10-CM

## 2024-06-24 DIAGNOSIS — G44.229 CHRONIC TENSION-TYPE HEADACHE, NOT INTRACTABLE: ICD-10-CM

## 2024-06-24 RX ORDER — PROPRANOLOL HYDROCHLORIDE 60 MG/1
60 CAPSULE, EXTENDED RELEASE ORAL DAILY
Qty: 30 CAPSULE | Refills: 0 | OUTPATIENT
Start: 2024-06-24 | End: 2024-07-24

## 2024-06-24 NOTE — PROGRESS NOTES
"Subjective:       Patient ID: Susu Luther is a 74 y.o. female.    PMH: Abdominal Aortic Aneurysm / Left Eye Vitreous Hemorrhage / Iron Deficiency / Chronic Pain / CHF / Neuropathic Pain / Thyroid Nodules / Pre-Dm / PE / OA / CKD Stage 3 / Obesity / NILS and other medical conditions.     Chief Complaint: "Headaches"      HPI    The patient presented on 2024 and 2024 for evaluation of "Headaches". Patient presents today for a follow-up evaluation and recommendation of "Headaches".       Interval History: (2024) - There was a plan to wean off of Gabapentin -wean to BID x a week then 1 per a week then off. She was started on Topamax 25 mg PO BID for headache prevention.      Started:  about a year or so.   Describes:  pressure  Timin to 12 hours.   Frequency: 3 to 4 times per week.   Pain:  4 to 7/ 10.   Location: Neck / front of the head.   Family: None.   Medications: NSAID's PRN / Fioricet's.   Worsen: physical activities.   Alleviated: relaxation /   Associated symptoms: dizziness ( lightheaded / spinning )   Triggers: none.   Prodrome symptoms: None           Referred by PCP.        HTN has been a problem.        Dizziness - lasting few seconds ( 30 to 40 ).       No relation between HA"s and dizziness.       Denied Nausea / phonophobia / photophobia.       No ED visit for the headaches.       Interval History: (2024) - Discontinued Coreg 6.25 mg BID and Topamax 25 mg PO BID. Patient started on Inderal-LA 60 mg PO Daily / PT Continued /     Patient reports that she finished Gabapentin last month. She is no longer taking it. No side effects per patient.     Patient reports that she stopped taking Topamax 25 mg PO BID due to side effects of diarrhea. Patient reports seeing the kidney doctor who reported that the Topamax inducing diarrhea can cause dehydration and cause kidney injury. Patient repots that she stopped taking Topamax on the 2024. She reports that it did help prevent " headaches from coming, but she could not tolerate the medication due to diarrhea.     Headaches: Patient reports a headache during the visit.     Started: over 5 years ago, worse within the last 3 years ago  Describes: Throbbing   Timing: Worse in the morning upon awakening / Duration of 1 hour- 12 hours   Frequency: 3 times per day for regular headaches / 2 migraine type headaches per day  Pain: 5/10-10/10  Location: Bilateral frontal / Right-sided neck pain (Muscular)  Family: None  Medications: Fioricet- helps / Tylenol- does not help / Topamax-experienced side effects of diarrhea /   Worsen: High blood pressure / Stress  Alleviated: Cold compress helps / Lay down in a dark and quiet room /   Associated symptoms: Sensitivity to light and sound / Dizziness / Nausea /   Triggers: Stress / High blood pressure  Prodrome symptoms: None    Patient reports a CVA-2009  No recent falls  Patient reports no ER visits for headaches within the last year.     Weight loss: Patient reports losing about 5lbs since starting Topamax, but has not noticed any changes in weight since stopping Topamax. Patient reports following with Dietitian for weight management.     Eye clinic: Patient reports last eye clinic evaluation was over a year ago. She reports that no abnormalities were noted. Her next appointment is in July 2024. Patient reports blurry vision while reading from far distances and up close. She denies double vision / eye pain / eye discharge.     CPAP- Patient reports she is not compliant with CPAP Machine. She reports having a recall on her old machine and being sent a new machine-patient reports she can't breathe well and the new machine does not fit correctly. Patient is offered a referral for at home sleep study and referral to Sleep Disorders. She reports she is in contact with her CPAP company representative to see if she can get a new machine. Patient reports that if new CPAP machine does not work she will consider  "referral for at home sleep study and referral to Sleep Disorders in the future.     Dizziness: Patient reports dizziness is associated with headaches and high blood pressure only. Frequency 3 times per day, duration of less than 30 seconds.      HTN: Patient reports that at home blood pressures run about 160-180/. She reports associated headaches with high blood pressure. Patient reports compliance with blood pressure medications without side effects.     Weakness of both lower extremities chronic x 1 year and currently in PT- improved with PT per patient- followed by PCP / Cardiologist / Hematology / Pain management / per patient      Telephone Consult: (06/07/2024) - Patient reported "worsening headaches and nausea" (see note). Patient started on Zofran-ODT 4 mg PO Q8H PRN Nausea / Fioricet -40 mg PO Q6H PRN Headaches x 2 week supply / Ubrelvy 50 mg PO PRN Migraine       New Issues: (06/2024)     No new issues per patient.     Medications: Inderal-LA 60 mg PO Daily / Zofran-ODT 4 mg PO Q8H PRN Nausea / Ubrelvy 50 mg PO PRN Migraine / Fioricet -40 mg PO Q6H PRN Headaches x 2 week supply - tolerating well- patient reports taking as prescribed.     -She reports that Inderal-LA 60 mg PO Daily was not effective for headache prevention therapy. She reports side effects of orthostatic hypotension and feeling lightheaded with positional changes.     -Zofran-ODT 4 mg PO Q8H PRN Nausea - helping significantly with nausea per patient.    -Ubrelvy 50 mg PO PRN Migraine  - patient unable to obtain due to insurance denying medication being sent to [facility] pharmacy - patient's PCP - re-sent medication to [facility] - as patient reports this pharmacy approves all of her other medications.     -Fioricet -40 mg PO Q6H PRN Headaches x 2 week supply - finished - effective but patient over uses.     -Zanaflex 4 mg BID PRN - muscle spasms - patient reports only using a few times per month     -Patient " "has not taken Effexor-XR 37.5 mg Daily since January 2024 - she reports she stopped taking it due to it not alleviating hot flashes.     Headaches:     Headaches are the same since last visit.   Location is still Bilateral frontal / Right-sided neck pain (Muscular)  Headache auras include none.   Frequency of tension type is 3 times per day and migraine type is 3 per day.   Duration of tension type is 1 hour-2 hours and migraine type is 2 hours.   Still "throbbing" in nature.  Pain intensity is 9/10  Worsened by High blood pressure / Stress -Patient is worried that she will have another stroke due to high BP  Triggered by High blood pressure / Stress   Alleviated with cold compress helps / Lay down in a dark and quiet room /   Associated symptoms still include Sensitivity to light and sound / Dizziness / Nausea /   Patient does report changes in vision. Next eye clinic appointment scheduled for July 2024.       Preventative therapies (tried and failed): Effexor-XR 37.5 mg Daily-plan to titrate to 75 mg daily - Current    -Topamax 25 mg PO BID- effective- could not tolerate due to side effects of Diarrhea.     -Inderal-LA 60 mg PO Daily - not effective - could not tolerate due to side effects of orthostatic hypotension - lightheaded with positional changes        Abortive therapies (tried and failed): Ubrelvy 50 mg Q8H PRN - Current    -Fioricet- Effective, but patient used excessively.         CPAP: Patient reports she is not compliant with CPAP Machine, but states that she is waiting for a new machine to come in via mail order.     Weight loss: Patient reports no weight loss since last visit. She is still following with Dietitian for weight management.     Dizziness: Patient reports dizziness is now occurring outside of headaches. Frequency has decreased to a few times per day and duration is still less than 30 seconds. She reports associated symptoms of ringing in ears / nausea / room spinning / blurry vision / " "double vision / positional changes do not make it worse - symptoms worse with high blood pressure readings per patient.     HTN: Patient reports that at home blood pressures run about 150-160's/90's. She reports associated headaches with high blood pressure. Patient reports compliance with blood pressure medications without side effects. She reports completing an online BP log with PCP. Cardiology is following and medications were adjusted per patient.     Weakness: Weakness of both lower extremities chronic x 1 year and currently in PT- improved with PT per patient- followed by PCP / Cardiologist / Hematology / Pain management / per patient      Review of Systems   HENT:  Positive for tinnitus.    Eyes:  Positive for photophobia and visual disturbance.   Gastrointestinal:  Positive for nausea.   Musculoskeletal:  Positive for neck pain.   Neurological:  Positive for dizziness, weakness and headaches.        Patient report that dizziness has associated symptoms of spinning sensation "room is spinning".    All other systems reviewed and are negative.                Current Outpatient Medications:     ALPRAZolam (XANAX) 0.5 MG tablet, Take 1 tablet (0.5 mg total) by mouth 2 (two) times daily as needed for Anxiety., Disp: 60 tablet, Rfl: 0    apixaban (ELIQUIS) 5 mg Tab, Take 1 tablet (5 mg total) by mouth 2 (two) times daily., Disp: 180 tablet, Rfl: 1    atorvastatin (LIPITOR) 20 MG tablet, Take 1 tablet (20 mg total) by mouth every evening., Disp: 90 tablet, Rfl: 0    cholecalciferol, vitamin D3, 1,250 mcg (50,000 unit) capsule, Take 1 capsule (50,000 Units total) by mouth every 7 days., Disp: 30 capsule, Rfl: 0    cloNIDine (CATAPRES) 0.1 MG tablet, Take 2 tablets (0.2 mg total) by mouth 2 (two) times daily as needed (blood pressure greater than 170/90)., Disp: 180 tablet, Rfl: 0    dapagliflozin propanediol (FARXIGA) 10 mg tablet, Take 1 tablet (10 mg total) by mouth once daily., Disp: 90 tablet, Rfl: 3    " diclofenac sodium (VOLTAREN) 1 % Gel, Apply 2 grams topically once daily., Disp: 400 g, Rfl: 1    gabapentin (NEURONTIN) 300 MG capsule, Take 1 capsule (300 mg total) by mouth 3 (three) times daily., Disp: 90 capsule, Rfl: 11    hydrALAZINE (APRESOLINE) 100 MG tablet, Take 1 tablet (100 mg total) by mouth 3 (three) times daily., Disp: 270 tablet, Rfl: 3    letrozole (FEMARA) 2.5 mg Tab, Take 1 tablet (2.5 mg total) by mouth once daily., Disp: 90 tablet, Rfl: 0    levocetirizine (XYZAL) 5 MG tablet, Take 1 tablet (5 mg total) by mouth every evening., Disp: 90 tablet, Rfl: 0    LIDOcaine (LIDODERM) 5 %, Place 2 patches onto the skin once daily. Remove & Discard patch within 12 hours or as directed by MD, Disp: 90 patch, Rfl: 1    multivitamin (THERAGRAN) per tablet, Take 1 tablet by mouth once daily., Disp: , Rfl:     ondansetron (ZOFRAN-ODT) 4 MG TbDL, Take 1 tablet (4 mg total) by mouth every 8 (eight) hours as needed (Nausea and Vomiting)., Disp: 30 tablet, Rfl: 0    propranoloL (INDERAL LA) 60 MG 24 hr capsule, Take 1 capsule (60 mg total) by mouth once daily., Disp: 30 capsule, Rfl: 0    ranolazine (RANEXA) 1,000 mg Tb12, Take 1 tablet (1,000 mg total) by mouth 2 (two) times daily., Disp: 180 tablet, Rfl: 3    sacubitriL-valsartan (ENTRESTO) 24-26 mg per tablet, Take 1 tablet by mouth 2 (two) times daily., Disp: 120 tablet, Rfl: 2    tamsulosin (FLOMAX) 0.4 mg Cap, Take 1 capsule (0.4 mg total) by mouth once daily., Disp: 30 capsule, Rfl: 0    tiZANidine (ZANAFLEX) 4 MG tablet, Take 1 tablet (4 mg total) by mouth 2 (two) times daily as needed., Disp: 60 tablet, Rfl: 2    torsemide (DEMADEX) 20 MG Tab, Take 2 tablets (40 mg total) by mouth once daily., Disp: 360 tablet, Rfl: 0    traMADoL (ULTRAM) 50 mg tablet, Take 1 tablet (50 mg total) by mouth every 8 (eight) hours as needed for Pain. Greater than 7 day supply medically necessary., Disp: 90 tablet, Rfl: 2    ubrogepant (UBRELVY) 50 mg tablet, Take 1 tablet (50  mg total) by mouth as needed for Migraine. If symptoms persist or return, may repeat dose after 2 hours. Maximum: 200 mg per 24 hours, Disp: 9 tablet, Rfl: 0    valsartan (DIOVAN) 80 MG tablet, Take 2 tablets (160 mg total) by mouth 2 (two) times daily., Disp: 180 tablet, Rfl: 3    venlafaxine (EFFEXOR-XR) 37.5 MG 24 hr capsule, Take 1 capsule (37.5 mg total) by mouth once daily., Disp: 30 capsule, Rfl: 11    VENOFER 100 mg iron/5 mL injection, , Disp: , Rfl:     zolpidem (AMBIEN) 5 MG Tab, Take 1 tablet (5 mg total) by mouth nightly as needed., Disp: 30 tablet, Rfl: 0    Past Medical History:   Diagnosis Date    Cataract     Bilateral    Chronic diastolic congestive heart failure 2020    Dizziness 2023    Encounter for blood transfusion     History of repair of aneurysm of abdominal aorta using endovascular stent graft     DR Bonds     of psychiatric care     Hypertension     Major depressive disorder, single episode, moderate with anxious distress 2020    Malignant neoplasm of upper-outer quadrant of right breast in female, estrogen receptor positive 2019    radiation    Psychiatric problem     Sleep apnea     Sleep difficulties     Stroke 2009    no residual defect    Therapy        Past Surgical History:   Procedure Laterality Date    APPENDECTOMY      AXILLARY NODE DISSECTION Right 2020    Procedure: LYMPHADENECTOMY, AXILLARY;  Surgeon: Artemio Starks MD;  Location: [facility] OR;  Service: General;  Laterality: Right;    BIOPSY OF AXILLARY NODE Right 2021    Procedure: BIOPSY, LYMPH NODE, AXILLARY;  Surgeon: Artemio Sutton MD;  Location: [address];  Service: General;  Laterality: Right;    BREAST BIOPSY      2019    BREAST LUMPECTOMY Right     2019     SECTION      x 1    OOPHORECTOMY      right     SENTINEL LYMPH NODE BIOPSY Right 2019    Procedure: BIOPSY, LYMPH NODE, SENTINEL;  Surgeon: Artemio Starks MD;  Location: [facility] OR;  Service: General;   Laterality: Right;    splenic artery aneurysm repair      TONSILLECTOMY         Social History     Socioeconomic History    Marital status:      Spouse name: Campos Luther    Number of children: 2   Tobacco Use    Smoking status: Never     Passive exposure: Past    Smokeless tobacco: Never   Substance and Sexual Activity    Alcohol use: No    Drug use: No    Sexual activity: Yes     Partners: Male     Birth control/protection: Post-menopausal     Social Determinants of Health     Financial Resource Strain: Low Risk  (5/19/2024)    Overall Financial Resource Strain (CARDIA)     Difficulty of Paying Living Expenses: Not hard at all   Food Insecurity: No Food Insecurity (5/19/2024)    Hunger Vital Sign     Worried About Running Out of Food in the Last Year: Never true     Ran Out of Food in the Last Year: Never true   Transportation Needs: No Transportation Needs (3/26/2024)    PRAPARE - Transportation     Lack of Transportation (Medical): No     Lack of Transportation (Non-Medical): No   Physical Activity: Insufficiently Active (5/19/2024)    Exercise Vital Sign     Days of Exercise per Week: 3 days     Minutes of Exercise per Session: 20 min   Stress: No Stress Concern Present (5/19/2024)    [employer] [place] [employer] - Occupational Stress Questionnaire     Feeling of Stress : Only a little   Housing Stability: High Risk (3/26/2024)    Housing Stability Vital Sign     Unable to Pay for Housing in the Last Year: No     Number of Places Lived in the Last Year: 1     Unstable Housing in the Last Year: Yes             Past/Current Medical/Surgical History, Past/Current Social History, Past/Current Family History and Past/Current Medications were reviewed in detail.        Objective:           VITAL SIGNS WERE REVIEWED      GENERAL APPEARANCE:     The patient looks comfortable.    BMI    No signs of respiratory distress.    Normal breathing pattern.    No dysmorphic features    Normal eye contact.        GENERAL MEDICAL EXAM:    HEENT:  Head is atraumatic normocephalic.     FUNDUSCOPIC (OPHTHALMOSCOPIC) EXAMINATION showed no disc edema (papilledema).      NECK: No JVD. No visible lesions or goiters.     CHEST-CARDIOPULMONARY: No cyanosis. No tachypnea. Normal respiratory effort.    [id number]: No jaundice. No stomas or lesions. No visible hernias. No catheters.     SKIN, HAIR, NAILS: No pathognomonic skin rash.No neurofibromatosis. No visible lesions.No stigmata of autoimmune disease. No clubbing.    LIMBS: No varicose veins. No visible swelling.    MUSCULOSKELETAL: No visible deformities.No visible lesions.             Neurologic Exam     Mental Status   Oriented to person, place, and time.   Oriented to person.   Oriented to place. Oriented to country, city and area.   Oriented to time. Oriented to year, month, date, day and season.   Registration: recalls 3 of 3 objects. Recall at 5 minutes: recalls 3 of 3 objects. Follows 3 step commands.   Attention: normal. Concentration: normal.   Speech: speech is normal   Level of consciousness: alert  Knowledge: good. Able to perform simple calculations.   Able to name object. Able to read. Able to repeat. Able to write. Normal comprehension.     Cranial Nerves     CN II   Visual fields full to confrontation.   Visual acuity: normal  Right visual field deficit: none  Left visual field deficit: none     CN III, IV, VI   Pupils are equal, round, and reactive to light.  Extraocular motions are normal.   Right pupil: Size: 2 mm. Shape: regular. Reactivity: brisk. Consensual response: intact. Accommodation: intact.   Left pupil: Size: 2 mm. Shape: regular. Reactivity: brisk. Consensual response: intact. Accommodation: intact.   CN III: no CN III palsy  CN VI: no CN VI palsy  Nystagmus: none   Diplopia: none  Ophthalmoparesis: none  Upgaze: normal  Downgaze: normal  Conjugate gaze: present  Vestibulo-ocular reflex: present    CN V   Facial  sensation intact.   Right facial sensation deficit: none  Left facial sensation deficit: none    CN VII   Facial expression full, symmetric.   Right facial weakness: none  Left facial weakness: none    CN VIII   CN VIII normal.   Hearing: intact    CN IX, X   CN IX normal.   CN X normal.   Palate: symmetric    CN XI   CN XI normal.   Right sternocleidomastoid strength: normal  Left sternocleidomastoid strength: normal  Right trapezius strength: normal  Left trapezius strength: normal    CN XII   CN XII normal.   Tongue: not atrophic  Fasciculations: absent  Tongue deviation: none      Patient reports slight bilateral frontal headache during exam.      Motor Exam   Muscle bulk: normal  Overall muscle tone: normal  Right arm pronator drift: absent  Left arm pronator drift: absent  Right leg tone: decreased  Left leg tone: decreased    Strength   Strength 5/5 throughout.   Right neck flexion: 5/5  Left neck flexion: 5/5  Right neck extension: 5/5  Left neck extension: 5/5  Right deltoid: 5/5  Left deltoid: 5/5  Right biceps: 5/5  Left biceps: 5/5  Right triceps: 5/5  Left triceps: 5/5  Right wrist flexion: 5/5  Left wrist flexion: 5/5  Right wrist extension: 5/5  Left wrist extension: 5/5  Right interossei: 5/5  Left interossei: 5/5  Right abdominals: 5/5  Left abdominals: 5/5  Right iliopsoas: 3/5  Left iliopsoas: 3/5  Right quadriceps: 3/5  Left quadriceps: 3/5  Right hamstring: 3/5  Left hamstring: 3/5  Right glutei: 3/5  Left glutei: 3/5  Right anterior tibial: 3/5  Left anterior tibial: 3/5  Right posterior tibial: 3/5  Left posterior tibial: 3/5  Right peroneal: 3/5  Left peroneal: 3/5  Right gastroc: 3/5  Left gastroc: 3/5    Sensory Exam   Light touch normal.   Vibration normal.   Proprioception normal.   Pinprick normal.   Graphesthesia: normal  Stereognosis: normal    Gait, Coordination, and Reflexes     Gait  Gait: wide-based    Coordination   Romberg: negative  Finger to nose coordination: normal  Heel to  shin coordination: abnormal  Tandem walking coordination: abnormal    Tremor   Resting tremor: absent  Intention tremor: absent  Action tremor: absent    Reflexes   Reflexes 2+ except as noted.   Right brachioradialis: 2+  Left brachioradialis: 2+  Right biceps: 2+  Left biceps: 2+  Right triceps: 2+  Left triceps: 2+  Right patellar: 2+  Left patellar: 2+  Right achilles: 2+  Left achilles: 2+  Right : 2+  Left : 2+  Right plantar: normal  Left plantar: normal  Right Peralta: absent  Left Peralta: absent  Right ankle clonus: absent  Left ankle clonus: absent  Right pendular knee jerk: absent  Left pendular knee jerk: absent    Patient uses roll aid walker for ambulating.          Lab Results   Component Value Date    WBC 7.10 04/15/2024    HGB 13.4 04/15/2024    HCT 43.9 04/15/2024    MCV 84 04/15/2024     04/15/2024       Sodium   Date Value Ref Range Status   06/12/2024 140 136 - 145 mmol/L Final     Potassium   Date Value Ref Range Status   06/12/2024 4.3 3.5 - 5.1 mmol/L Final     Chloride   Date Value Ref Range Status   06/12/2024 107 95 - 110 mmol/L Final     CO2   Date Value Ref Range Status   06/12/2024 21 (L) 23 - 29 mmol/L Final     Glucose   Date Value Ref Range Status   06/12/2024 104 70 - 110 mg/dL Final     BUN   Date Value Ref Range Status   06/12/2024 21 8 - 23 mg/dL Final     Creatinine   Date Value Ref Range Status   06/12/2024 1.3 0.5 - 1.4 mg/dL Final     Calcium   Date Value Ref Range Status   06/12/2024 10.2 8.7 - 10.5 mg/dL Final     Total Protein   Date Value Ref Range Status   06/12/2024 7.6 6.0 - 8.4 g/dL Final     Albumin   Date Value Ref Range Status   06/12/2024 3.4 (L) 3.5 - 5.2 g/dL Final     Total Bilirubin   Date Value Ref Range Status   06/12/2024 0.4 0.1 - 1.0 mg/dL Final     Comment:     For infants and newborns, interpretation of results should be based  on gestational age, weight and in agreement with clinical  observations.    Premature Infant recommended  "reference ranges:  Up to 24 hours.............<8.0 mg/dL  Up to 48 hours............<12.0 mg/dL  3-5 days..................<15.0 mg/dL  6-29 days.................<15.0 mg/dL       Alkaline Phosphatase   Date Value Ref Range Status   06/12/2024 111 55 - 135 U/L Final     AST   Date Value Ref Range Status   06/12/2024 11 10 - 40 U/L Final     ALT   Date Value Ref Range Status   06/12/2024 8 (L) 10 - 44 U/L Final     Anion Gap   Date Value Ref Range Status   06/12/2024 12 8 - 16 mmol/L Final     eGFR if    Date Value Ref Range Status   07/06/2022 37 (A) >60 mL/min/1.73 m^2 Final     eGFR if non    Date Value Ref Range Status   07/06/2022 32 (A) >60 mL/min/1.73 m^2 Final     Comment:     Calculation used to obtain the estimated glomerular filtration  rate (eGFR) is the CKD-EPI equation.          Lab Results   Component Value Date    [id number] 741 04/18/2022       Lab Results   Component Value Date    TSH 1.691 03/10/2023    FREET4 0.92 10/18/2021       No results found in the last 24 hours.    No results found in the last 24 hours.    Reviewed the neuroimaging independently       Assessment:   74 Years old Female with PMH as above came for a follow-up evaluation of "Headaches".   .   Chronic tension-type headache, not intractable     NILS (obstructive sleep apnea)     Class 3 severe obesity with serious comorbidity and body mass index (BMI) of 40.0 to 44.9 in adult, unspecified obesity type     Hypertension, unspecified type     Weakness of both lower extremities     Dizziness     Plan:   Patient Neurological Assessment is remarkable for chronic bilateral lower extremity weakness and mild bilateral frontal headache during exam. Based on history and physical patient's headaches seem to be tension-type-triggered by stress and HTN. Headaches are still still severe, frequent, debilitating, and interfering with daily activities. Patient's symptoms of dizziness / ringing in ears / nausea / room " spinning / blurry vision / double vision occurring outside of headaches point to rule out BPPV.      Chronic tension-type headache, not intractable     -Continue to follow ophthalmology for routine eye exams and for evaluation of bilateral blurry vision. Patient verbalizes understanding.     -Discontinue Inderal-LA 60 mg PO Daily for headache prevention and HTN management due to ineffectiveness and patient experiencing side effects of orthostatic hypotension -feeling lightheaded with positional changes.     -Continue Zofran-ODT 4 mg PO Q8H PRN Nausea. Side effects dicussed. Patient verbalized understanding. No refills needed.    -Continue Ubrelvy 50 mg PO PRN Migraine. Side effects dicussed. Patient verbalized understanding. No refills needed.    -Refill Fioricet -40 mg PO Q6H PRN Headaches x 2 week supply to cover patient until effective dosage is reached with Effexor-XR and until Ubrelvy comes in via mail order.     -Continue Effexor-XR 37.5 mg PO Daily for headache prevention therapy. Educated patient on instructions: Week 1: Take 1 tab (37.5 mg total) / Week 2: Take 2 tabs (75 mg total).     -Order ISAC / Vitamin D / Folate / B-12 / TSH for neurologic evaluation of headaches.     NILS (obstructive sleep apnea)     -Non compliance with CPaP - discussed it - Patient is offered a referral for at home sleep study and referral to [facility]. She is awaiting delivery of new CPaP Machine.     Class 3 severe obesity    -Patient is instructed on lifestyle modifications for weight management such as heart healthy diet, limiting excess sugar, limiting excess fried and fatty foods, weight management, eating lean meats and vegetables, staying hydrated, avoiding alcohol and smoking, and exercising at least 30 minutes per day. Patient instructed to continue taking medications as prescribed and to continue to follow PCP for management. Patient verbalizes understanding.      -Continue following with Dietitian  for weight management. Patient verbalizes understanding.     Hypertension, unspecified type     -Blood pressure note to be elevated today (151/97) P (70). Continue HTN medications as prescribed and follow-ups with PCP and Cardiology for management of other medical conditions, including HTN.     - Instructed patient to reach out to cardiology and PCP to see about re-starting Coreg 6.25 mg BID if needed for BP management. Appointment to see PCP tomorrow per patient. Patient verbalizes understanding.     Weakness of both lower extremities     -Patient finished PT for chronic bilateral lower extremity weakness. Will re-order for vestibular therapy to rule out BPPV.     Dizziness     -Order for vestibular therapy to rule out BPPV.     -Discontinue Gabapentin - as patient is no longer taking since 04/2024.             LABORATORY EVALUATION    Labs: ([id number]) CMP / CBC / Iron and TIBC / Ferritin / Fecal Immunochemical Test / Lipid Panel / BNP / Magnesium / Troponin / CK /  -personally reviewed -non-significant abnormalities except     GFR (43.7) - decreased kidney function noted- instructed patient to continue following PCP Nephrology for management. She verbalizes understanding.         RADIOLOGY EVALUATION     MRI Lumbar Spine Without Contrast- done 02/2024- personally reviewed - Multilevel lumbar spondylosis and degenerative disc disease, most pronounced at L5-S1     Head CT Without Contrast- done 12/2023- personally reviewed- no acute abnormalities noted    Brain MRI With and Without Contrast- done 03/2023- personally reviewed- no acute abnormalities noted    MRA Brain Without Contrast- done 03/2023- personally reviewed- No large vessel occlusion or critical stenosis / cerebral arteries disease distally     MRA Neck Without Contrast- done 03/2023- personally reviewed- non-significant stenosis or occlusion                     MEDICAL/SURGICAL COMORBIDITIES     All relevant medical comorbidities noted and managed by  primary care physician and medical care team.          HEALTHY LIFESTYLE AND PREVENTATIVE CARE    The patient to adhere to the age-appropriate health maintenance guidelines including screening tests and vaccinations. The patient to adhere to  healthy lifestyle, optimal weight, exercise, healthy diet, good sleep hygiene and avoiding drugs including smoking, alcohol and recreational drugs.      I spent a total of  107 minutes on the day of the visit.This includes face to face time and non-face to face time preparing to see the patient (eg, review of tests), obtaining and/or reviewing separately obtained history, documenting clinical information in the electronic or other health record, independently interpreting results and communicating results to the patient/family/caregiver, or care coordinator.       Please do not hesitate to contact me with any updates, questions or concerns.    [id number]    Matthew Reyes MSN, FNP-C    General Neurology

## 2024-06-25 ENCOUNTER — LAB VISIT (OUTPATIENT)
Dept: LAB | Facility: HOSPITAL | Age: 74
End: 2024-06-25
Attending: STUDENT IN AN ORGANIZED HEALTH CARE EDUCATION/TRAINING PROGRAM
Payer: MEDICARE

## 2024-06-25 ENCOUNTER — OFFICE VISIT (OUTPATIENT)
Dept: NEUROLOGY | Facility: CLINIC | Age: 74
End: 2024-06-25
Payer: MEDICARE

## 2024-06-25 VITALS
SYSTOLIC BLOOD PRESSURE: 151 MMHG | HEART RATE: 70 BPM | DIASTOLIC BLOOD PRESSURE: 97 MMHG | BODY MASS INDEX: 39.68 KG/M2 | WEIGHT: 293 LBS | HEIGHT: 72 IN

## 2024-06-25 DIAGNOSIS — E66.01 CLASS 3 SEVERE OBESITY WITH SERIOUS COMORBIDITY AND BODY MASS INDEX (BMI) OF 40.0 TO 44.9 IN ADULT, UNSPECIFIED OBESITY TYPE: ICD-10-CM

## 2024-06-25 DIAGNOSIS — E55.9 VITAMIN D DEFICIENCY: ICD-10-CM

## 2024-06-25 DIAGNOSIS — I10 HYPERTENSION, UNSPECIFIED TYPE: ICD-10-CM

## 2024-06-25 DIAGNOSIS — E03.9 HYPOTHYROIDISM, UNSPECIFIED TYPE: ICD-10-CM

## 2024-06-25 DIAGNOSIS — R29.898 WEAKNESS OF BOTH LOWER EXTREMITIES: ICD-10-CM

## 2024-06-25 DIAGNOSIS — E53.8 FOLATE DEFICIENCY: ICD-10-CM

## 2024-06-25 DIAGNOSIS — G47.33 OSA (OBSTRUCTIVE SLEEP APNEA): ICD-10-CM

## 2024-06-25 DIAGNOSIS — E53.8 VITAMIN B12 DEFICIENCY: ICD-10-CM

## 2024-06-25 DIAGNOSIS — R42 DIZZINESS: ICD-10-CM

## 2024-06-25 DIAGNOSIS — G44.229 CHRONIC TENSION-TYPE HEADACHE, NOT INTRACTABLE: Primary | ICD-10-CM

## 2024-06-25 PROCEDURE — 82570 ASSAY OF URINE CREATININE: CPT | Mod: HCNC | Performed by: STUDENT IN AN ORGANIZED HEALTH CARE EDUCATION/TRAINING PROGRAM

## 2024-06-25 PROCEDURE — 99999 PR PBB SHADOW E&M-EST. PATIENT-LVL V: CPT | Mod: PBBFAC,HCNC,,

## 2024-06-25 PROCEDURE — 83835 ASSAY OF METANEPHRINES: CPT | Mod: HCNC | Performed by: STUDENT IN AN ORGANIZED HEALTH CARE EDUCATION/TRAINING PROGRAM

## 2024-06-25 RX ORDER — VENLAFAXINE HYDROCHLORIDE 37.5 MG/1
37.5 CAPSULE, EXTENDED RELEASE ORAL DAILY
Qty: 30 CAPSULE | Refills: 0 | Status: SHIPPED | OUTPATIENT
Start: 2024-06-25 | End: 2024-07-25

## 2024-06-25 RX ORDER — PROPRANOLOL HYDROCHLORIDE 60 MG/1
60 CAPSULE, EXTENDED RELEASE ORAL DAILY
Qty: 30 CAPSULE | Refills: 0 | OUTPATIENT
Start: 2024-06-25 | End: 2024-07-25

## 2024-06-25 RX ORDER — BUTALBITAL, ACETAMINOPHEN AND CAFFEINE 50; 325; 40 MG/1; MG/1; MG/1
1 TABLET ORAL EVERY 6 HOURS PRN
Qty: 20 TABLET | Refills: 0 | Status: SHIPPED | OUTPATIENT
Start: 2024-06-25 | End: 2024-07-09

## 2024-06-26 ENCOUNTER — OFFICE VISIT (OUTPATIENT)
Dept: INTERNAL MEDICINE | Facility: CLINIC | Age: 74
End: 2024-06-26
Payer: MEDICARE

## 2024-06-26 VITALS
OXYGEN SATURATION: 97 % | HEIGHT: 72 IN | WEIGHT: 293 LBS | TEMPERATURE: 99 F | DIASTOLIC BLOOD PRESSURE: 102 MMHG | HEART RATE: 81 BPM | SYSTOLIC BLOOD PRESSURE: 156 MMHG | BODY MASS INDEX: 39.68 KG/M2

## 2024-06-26 DIAGNOSIS — G89.4 CHRONIC PAIN DISORDER: Primary | ICD-10-CM

## 2024-06-26 DIAGNOSIS — N61.0 INFLAMMATORY DISORDER OF BREAST: ICD-10-CM

## 2024-06-26 DIAGNOSIS — N64.4 BREAST PAIN, RIGHT: ICD-10-CM

## 2024-06-26 PROCEDURE — 4010F ACE/ARB THERAPY RXD/TAKEN: CPT | Mod: HCNC,CPTII,S$GLB, | Performed by: FAMILY MEDICINE

## 2024-06-26 PROCEDURE — 99999 PR PBB SHADOW E&M-EST. PATIENT-LVL V: CPT | Mod: PBBFAC,HCNC,, | Performed by: FAMILY MEDICINE

## 2024-06-26 PROCEDURE — 99213 OFFICE O/P EST LOW 20 MIN: CPT | Mod: HCNC,S$GLB,, | Performed by: FAMILY MEDICINE

## 2024-06-26 PROCEDURE — 1101F PT FALLS ASSESS-DOCD LE1/YR: CPT | Mod: HCNC,CPTII,S$GLB, | Performed by: FAMILY MEDICINE

## 2024-06-26 PROCEDURE — 3288F FALL RISK ASSESSMENT DOCD: CPT | Mod: HCNC,CPTII,S$GLB, | Performed by: FAMILY MEDICINE

## 2024-06-26 PROCEDURE — 1159F MED LIST DOCD IN RCRD: CPT | Mod: HCNC,CPTII,S$GLB, | Performed by: FAMILY MEDICINE

## 2024-06-26 PROCEDURE — 1126F AMNT PAIN NOTED NONE PRSNT: CPT | Mod: HCNC,CPTII,S$GLB, | Performed by: FAMILY MEDICINE

## 2024-06-26 PROCEDURE — 3077F SYST BP >= 140 MM HG: CPT | Mod: HCNC,CPTII,S$GLB, | Performed by: FAMILY MEDICINE

## 2024-06-26 PROCEDURE — 3066F NEPHROPATHY DOC TX: CPT | Mod: HCNC,CPTII,S$GLB, | Performed by: FAMILY MEDICINE

## 2024-06-26 PROCEDURE — 1157F ADVNC CARE PLAN IN RCRD: CPT | Mod: HCNC,CPTII,S$GLB, | Performed by: FAMILY MEDICINE

## 2024-06-26 PROCEDURE — 3008F BODY MASS INDEX DOCD: CPT | Mod: HCNC,CPTII,S$GLB, | Performed by: FAMILY MEDICINE

## 2024-06-26 PROCEDURE — 3080F DIAST BP >= 90 MM HG: CPT | Mod: HCNC,CPTII,S$GLB, | Performed by: FAMILY MEDICINE

## 2024-06-26 NOTE — PROGRESS NOTES
Subjective:       Patient ID: Susu Luther is a 74 y.o. female.    Chief Complaint: Follow-up    Headaches- controlled with floricet, none of the new medications are working, advised to take Effexor XR 37.5 (already on medication), then increase to 75mg after 1 week  Discontinue carvedilol and started propanolol   Dizziness- Neurology would like to send to PT, numbness in the right arm and hand, will keep an open mind    CPAP- not adherent due to waiting on new machine, old machine recalled  Breast pain/lump: repeat mammogram in 6 months, pain and numbness now   HTN- taking medications as prescribed       Review of Systems   Constitutional:  Positive for fatigue. Negative for chills and fever.   Respiratory:  Negative for cough, shortness of breath and wheezing.    Cardiovascular:  Positive for leg swelling. Negative for chest pain and palpitations.   Musculoskeletal:  Positive for arthralgias and gait problem.   Neurological:  Positive for dizziness, weakness and headaches. Negative for tremors, seizures, syncope, facial asymmetry, speech difficulty, light-headedness and numbness.       Objective:      Physical Exam  Vitals reviewed.   HENT:      Head: Normocephalic and atraumatic.      Nose: No congestion or rhinorrhea.   Eyes:      General: No scleral icterus.        Right eye: No discharge.         Left eye: No discharge.      Extraocular Movements: Extraocular movements intact.      Conjunctiva/sclera: Conjunctivae normal.      Pupils: Pupils are equal, round, and reactive to light.   Cardiovascular:      Rate and Rhythm: Normal rate and regular rhythm.      Heart sounds: No murmur heard.     No friction rub. No gallop.   Pulmonary:      Effort: Pulmonary effort is normal. No respiratory distress.      Breath sounds: Normal breath sounds. No stridor. No wheezing or rhonchi.   Abdominal:      General: Bowel sounds are normal.      Palpations: Abdomen is soft.   Musculoskeletal:      Right lower leg: Edema  present.      Left lower leg: Edema present.   Skin:     General: Skin is warm.   Neurological:      General: No focal deficit present.      Mental Status: She is alert.   Psychiatric:         Mood and Affect: Mood normal.         Behavior: Behavior normal.         Assessment:       1. Chronic pain disorder    2. Inflammatory disorder of breast    3. Breast pain, right        Plan:   1. Chronic pain disorder  -     Ambulatory referral/consult to Pain Clinic; Future; Expected date: 07/03/2024    2. Inflammatory disorder of breast  -     MRI Breast w/o Contrast, Unilateral; Future; Expected date: 06/26/2024    3. Breast pain, right  -     MRI Breast w/o Contrast, Unilateral; Future; Expected date: 06/26/2024       Future Appointments   Date Time Provider Department Center   7/8/2024 12:30 PM Amairani Zaidi, PT [facility] [place] [facility]   7/11/2024  2:45 PM Raul Boyd OD [facility] OPHTHAL  Medical C   7/15/2024  9:00 AM Matthew Reyes NP [facility] NEURO [facility]   7/15/2024 10:35 AM LABORATORY, [facility] LAB [facility]   7/18/2024  2:00 PM Omaira Bingham NP [facility] HEM ONC [facility]   7/22/2024 11:00 AM [facility] MRI [facility] MRI [facility]   7/26/2024 11:40 AM Rubi Littlejohn MD [facility]   7/31/2024 11:00 AM Alexus Rios NP [facility] CARDIO  Medical C   9/4/2024 12:45 PM Barry Edwards MD [facility] IN PN  Medical    11/15/2024  3:30 PM Elier Hernandez MD [facility] RHEUM [facility]   1/16/2025 12:30 PM Stephon Adamson MD [facility] RHEU  Medical C         Rubi Littlejohn MD  Family Medicine

## 2024-06-27 ENCOUNTER — TELEPHONE (OUTPATIENT)
Dept: NEUROLOGY | Facility: CLINIC | Age: 74
End: 2024-06-27
Payer: MEDICARE

## 2024-06-27 ENCOUNTER — PATIENT MESSAGE (OUTPATIENT)
Dept: INTERNAL MEDICINE | Facility: CLINIC | Age: 74
End: 2024-06-27
Payer: MEDICARE

## 2024-06-27 DIAGNOSIS — R76.8 POSITIVE ANA (ANTINUCLEAR ANTIBODY): Primary | ICD-10-CM

## 2024-06-27 NOTE — TELEPHONE ENCOUNTER
Called pt to better explain lab results with Ms. Nataleens guidance. Explained to pt that her  does not need to be tested for anything. Pt verbalized understanding.

## 2024-06-27 NOTE — TELEPHONE ENCOUNTER
Called patient to notify them of their test results. Spoke with patient's . He verbalized understanding.

## 2024-06-27 NOTE — TELEPHONE ENCOUNTER
----- Message from Matthew Reyes NP sent at 6/27/2024  8:38 AM CDT -----  Good-morning Mrs. Luther,     Your ISCA screen came back positive. This could possibly be caused from a recent viral infection or autoimmune disorder.     I will refer you to Rheumatology for further work up and evaluation of the results. You will receive a call to schedule an appointment with them.     We will discuss results further at your next follow up visit.   Please let me know if you have any questions or concerns.  Have a great day!    EUGENE Castro

## 2024-06-28 ENCOUNTER — TELEPHONE (OUTPATIENT)
Dept: INTERNAL MEDICINE | Facility: CLINIC | Age: 74
End: 2024-06-28
Payer: MEDICARE

## 2024-06-28 ENCOUNTER — PATIENT MESSAGE (OUTPATIENT)
Dept: INTERNAL MEDICINE | Facility: CLINIC | Age: 74
End: 2024-06-28
Payer: MEDICARE

## 2024-06-28 NOTE — TELEPHONE ENCOUNTER
I returned a call back to the pt and explained what was going on with her positive ISAC test she received a more clearer understanding and was scheduled for a sooner appt in Nov but, will keep the one scheduled and will remain on the waiting list for sooner appt. //kah

## 2024-07-01 DIAGNOSIS — J30.2 SEASONAL ALLERGIES: ICD-10-CM

## 2024-07-01 DIAGNOSIS — Z17.0 MALIGNANT NEOPLASM OF UPPER-OUTER QUADRANT OF RIGHT BREAST IN FEMALE, ESTROGEN RECEPTOR POSITIVE: ICD-10-CM

## 2024-07-01 DIAGNOSIS — E78.5 HYPERLIPIDEMIA, UNSPECIFIED HYPERLIPIDEMIA TYPE: ICD-10-CM

## 2024-07-01 DIAGNOSIS — C50.411 MALIGNANT NEOPLASM OF UPPER-OUTER QUADRANT OF RIGHT BREAST IN FEMALE, ESTROGEN RECEPTOR POSITIVE: ICD-10-CM

## 2024-07-01 LAB
CREAT UR-MCNC: 295 MG/DL (ref 16–326)
METANEPH/CREAT UR: 137 MCG/G CR
METANEPHS/CREAT UR: 639 MCG/G CR
NORMETANEPHRINE/CREAT UR: 502 MCG/G CR

## 2024-07-01 RX ORDER — ATORVASTATIN CALCIUM 20 MG/1
20 TABLET, FILM COATED ORAL NIGHTLY
Qty: 90 TABLET | Refills: 3 | Status: SHIPPED | OUTPATIENT
Start: 2024-07-01

## 2024-07-01 RX ORDER — LEVOCETIRIZINE DIHYDROCHLORIDE 5 MG/1
5 TABLET, FILM COATED ORAL NIGHTLY
Qty: 90 TABLET | Refills: 3 | Status: SHIPPED | OUTPATIENT
Start: 2024-07-01

## 2024-07-01 RX ORDER — LETROZOLE 2.5 MG/1
2.5 TABLET, FILM COATED ORAL
Qty: 90 TABLET | Refills: 3 | Status: SHIPPED | OUTPATIENT
Start: 2024-07-01

## 2024-07-03 DIAGNOSIS — I10 HYPERTENSION, UNSPECIFIED TYPE: ICD-10-CM

## 2024-07-03 DIAGNOSIS — I10 HYPERTENSION, UNSPECIFIED TYPE: Primary | ICD-10-CM

## 2024-07-03 DIAGNOSIS — R79.89 ELEVATED PLASMA METANEPHRINES: ICD-10-CM

## 2024-07-04 RX ORDER — TORSEMIDE 20 MG/1
40 TABLET ORAL DAILY
Qty: 360 TABLET | Refills: 0 | Status: SHIPPED | OUTPATIENT
Start: 2024-07-04

## 2024-07-08 DIAGNOSIS — I10 HYPERTENSION, UNSPECIFIED TYPE: ICD-10-CM

## 2024-07-08 NOTE — TELEPHONE ENCOUNTER
Requested Prescriptions     Pending Prescriptions Disp Refills    torsemide (DEMADEX) 20 MG Tab 360 tablet 0     Sig: Take 2 tablets (40 mg total) by mouth once daily.

## 2024-07-11 ENCOUNTER — OFFICE VISIT (OUTPATIENT)
Dept: OPHTHALMOLOGY | Facility: CLINIC | Age: 74
End: 2024-07-11
Payer: MEDICARE

## 2024-07-11 DIAGNOSIS — H04.123 DRY EYES, BILATERAL: ICD-10-CM

## 2024-07-11 DIAGNOSIS — H52.7 REFRACTIVE ERRORS: ICD-10-CM

## 2024-07-11 DIAGNOSIS — Z96.1 PSEUDOPHAKIA OF BOTH EYES: Primary | ICD-10-CM

## 2024-07-11 DIAGNOSIS — Z87.898 HISTORY OF PREDIABETES: ICD-10-CM

## 2024-07-11 PROCEDURE — 1157F ADVNC CARE PLAN IN RCRD: CPT | Mod: HCNC,CPTII,S$GLB, | Performed by: OPTOMETRIST

## 2024-07-11 PROCEDURE — 3066F NEPHROPATHY DOC TX: CPT | Mod: HCNC,CPTII,S$GLB, | Performed by: OPTOMETRIST

## 2024-07-11 PROCEDURE — 99999 PR PBB SHADOW E&M-EST. PATIENT-LVL III: CPT | Mod: PBBFAC,HCNC,, | Performed by: OPTOMETRIST

## 2024-07-11 PROCEDURE — 1159F MED LIST DOCD IN RCRD: CPT | Mod: HCNC,CPTII,S$GLB, | Performed by: OPTOMETRIST

## 2024-07-11 PROCEDURE — 92014 COMPRE OPH EXAM EST PT 1/>: CPT | Mod: HCNC,S$GLB,, | Performed by: OPTOMETRIST

## 2024-07-11 PROCEDURE — 4010F ACE/ARB THERAPY RXD/TAKEN: CPT | Mod: HCNC,CPTII,S$GLB, | Performed by: OPTOMETRIST

## 2024-07-11 PROCEDURE — 92015 DETERMINE REFRACTIVE STATE: CPT | Mod: HCNC,S$GLB,, | Performed by: OPTOMETRIST

## 2024-07-12 ENCOUNTER — PATIENT MESSAGE (OUTPATIENT)
Dept: OPHTHALMOLOGY | Facility: CLINIC | Age: 74
End: 2024-07-12
Payer: MEDICARE

## 2024-07-16 ENCOUNTER — HOSPITAL ENCOUNTER (OUTPATIENT)
Dept: RADIOLOGY | Facility: CLINIC | Age: 74
Discharge: HOME | End: 2024-07-16
Attending: NURSE PRACTITIONER
Payer: MEDICARE

## 2024-07-16 ENCOUNTER — OFFICE VISIT (OUTPATIENT)
Dept: URGENT CARE | Facility: CLINIC | Age: 74
End: 2024-07-16
Payer: MEDICARE

## 2024-07-16 VITALS
SYSTOLIC BLOOD PRESSURE: 134 MMHG | OXYGEN SATURATION: 97 % | TEMPERATURE: 96 F | DIASTOLIC BLOOD PRESSURE: 85 MMHG | RESPIRATION RATE: 28 BRPM | HEART RATE: 81 BPM

## 2024-07-16 DIAGNOSIS — R42 DIZZINESS: ICD-10-CM

## 2024-07-16 DIAGNOSIS — J02.9 SORE THROAT: ICD-10-CM

## 2024-07-16 DIAGNOSIS — R42 DIZZINESS: Primary | ICD-10-CM

## 2024-07-16 DIAGNOSIS — R49.0 HOARSENESS: ICD-10-CM

## 2024-07-16 LAB
CTP QC/QA: YES
GLUCOSE SERPL-MCNC: 109 MG/DL (ref 70–110)
MOLECULAR STREP A: NEGATIVE
OHS QRS DURATION: 82 MS
OHS QTC CALCULATION: 433 MS

## 2024-07-16 PROCEDURE — 93010 ELECTROCARDIOGRAM REPORT: CPT | Mod: S$GLB,,, | Performed by: INTERNAL MEDICINE

## 2024-07-16 PROCEDURE — 71046 X-RAY EXAM CHEST 2 VIEWS: CPT | Mod: S$GLB,,, | Performed by: RADIOLOGY

## 2024-07-16 PROCEDURE — 87651 STREP A DNA AMP PROBE: CPT | Mod: QW,S$GLB,, | Performed by: NURSE PRACTITIONER

## 2024-07-16 PROCEDURE — 93005 ELECTROCARDIOGRAM TRACING: CPT | Mod: S$GLB,,, | Performed by: NURSE PRACTITIONER

## 2024-07-16 PROCEDURE — 99214 OFFICE O/P EST MOD 30 MIN: CPT | Mod: S$GLB,,, | Performed by: NURSE PRACTITIONER

## 2024-07-16 PROCEDURE — 82962 GLUCOSE BLOOD TEST: CPT | Mod: S$GLB,,, | Performed by: NURSE PRACTITIONER

## 2024-07-18 NOTE — PROGRESS NOTES
Subjective:       Patient ID: Susu Luther is a 74 y.o. female.    Chief Complaint:   1. Malignant neoplasm of upper-outer quadrant of right breast in female, estrogen receptor positive  Stage IIA (pT2, pN0(sn), cM0, G2, ER+, [place]-, HER2-) invasive lobular carcinoma of right breast      2. Acute pulmonary embolism, unspecified pulmonary embolism type, unspecified whether acute cor pulmonale present          Current Treatment:  Femara 2.5mg po daily      Eliquis 5mg po BID    Treatment History:  S/p lumpectomy & sentinel LN biopsy, right, Dr. Starks, 2/28/2019        XRT from 5/20/2019 to 6/18/2019    3.   VENOFER (IRON SUCROSE) QW on 2/29/2024, 3/4/2024, 5/20/2024, & 5/29/2024    HPI: This is a 74-year-old obese  female with diastolic CHF HTN, major depressive disorder, sleep apnea, CVA, AAA repair, and Stage IIA (pT2, pN0(sn), cM0, G2, ER+, [place]-, HER2-) invasive lobular carcinoma of right breast, s/p lumpectomy with sentinel lymph node biopsy in 2019.  She has since been on aromatase inhibitor and has been tolerating well.     Restaging PET-CT scan performed on 11/4/2020 revealed large hypermetabolic right subpectoral lymph node mass measuring 6.9 x 3.2 cm with SUV max of 13.0.     Right chest wall lymph node biopsy performed on 11/10/2020 revealed fragments of benign lymph node with no evidence of malignancy.  Excisional biopsy of right subpectoral lymph node was again performed on 12/2/2020 and returned as benign with no evidence of metastatic disease.     Case was discussed at the tumor conference and recommendation was to continue aromatase inhibitor with plan for repeat short interval imaging to reassess the right subpectoral lymph node mass. This was discussed with patient, however, she was not satisfied with the recommendation and wanted a 2nd opinion with a different oncologist.     She was referred to breast surgeon in [facility]. She had MRI and has a planned surgical resection on  3/1/2021. Status post lymph node excision on 3/1/2021 that showed follicular hyperplasia.    She is currently on Femara and Eliquis.    Interval History: Patient presents for follow up on Femara and Eliquis. She presents alone at home and complains of fluid leaking from her breast and having to wear pads to collect the fluid. She also complains of arm pain along with breast tenderness, warmth, and heaviness. MMG/US revealed a vague area of mixed echogenicity just superior to the scar within the lateral right breast at approximately 09:00 o'clock position that demonstrates either normal central fat or normal breast tissue centrally is surrounded by a a thin vague hypoechoic area. It was thought to be an area of postsurgical sequela. MRI breast done today demonstrated a right postpectoral mass measuring at least 3.1x1.8cm. This mass has been visualized on PET in the past as hypermetabolic but multiple biopsies have been nondiagnostic for malignancy. In the past, she was not satisfied with the recommendation to monitor this mass and sought a 2nd opinion with Dr. Sutton. He recommended excision but per her report, they decided not to proceed with another surgery. She reports having experienced a hypertensive crisis and being placed in the ICU with her last breast surgery. She did not want to put her children and  through that again with another surgery. Will proceed with CT chest recommended per breast MRI. She has a follow up with her PCP who ordered the CT on 7/26/2024 to review results.     Reviewed labs with patient:   CBC:   Recent Labs   Lab 04/15/24  1030   WBC 7.10   RBC 5.25   Hemoglobin 13.4   Hematocrit 43.9   Platelets 281   MCV 84   MCH 25.5 L   MCHC 30.5 L     CMP:  Recent Labs   Lab 06/12/24  1236   Glucose 104   Calcium 10.2   Albumin 3.4 L   Total Protein 7.6   Sodium 140   Potassium 4.3   CO2 21 L   Chloride 107   BUN 21   Creatinine 1.3   Alkaline Phosphatase 111   ALT 8 L   AST 11   Total  Bilirubin 0.4     Lab Results   Component Value Date    IRON 80 04/15/2024    TRANSFERRIN 190 (L) 04/15/2024    TIBC 281 04/15/2024    FESATURATED 28 04/15/2024      Lab Results   Component Value Date    FERRITIN 125 04/15/2024     The patient location is: [place]  The chief complaint leading to consultation is: iron deficiency, breast cancer    Visit type: audiovisual    Face to Face time with patient: 13 minutes  37 minutes of total time spent on the encounter, which includes face to face time and non-face to face time preparing to see the patient (eg, review of tests), Obtaining and/or reviewing separately obtained history, Documenting clinical information in the electronic or other health record, Independently interpreting results (not separately reported) and communicating results to the patient/family/caregiver, or Care coordination (not separately reported).     Each patient to whom he or she provides medical services by telemedicine is:  (1) informed of the relationship between the physician and patient and the respective role of any other health care provider with respect to management of the patient; and (2) notified that he or she may decline to receive medical services by telemedicine and may withdraw from such care at any time.    Social History     Socioeconomic History    Marital status:      Spouse name: Campos Luther    Number of children: 2   Tobacco Use    Smoking status: Never     Passive exposure: Past    Smokeless tobacco: Never   Substance and Sexual Activity    Alcohol use: No    Drug use: No    Sexual activity: Yes     Partners: Male     Birth control/protection: Post-menopausal     Social Determinants of Health     Financial Resource Strain: Low Risk  (5/19/2024)    Overall Financial Resource Strain (CARDIA)     Difficulty of Paying Living Expenses: Not hard at all   Food Insecurity: No Food Insecurity (5/19/2024)    Hunger Vital Sign     Worried About Running Out of Food in the  Last Year: Never true     Ran Out of Food in the Last Year: Never true   Transportation Needs: No Transportation Needs (3/26/2024)    PRAPARE - Transportation     Lack of Transportation (Medical): No     Lack of Transportation (Non-Medical): No   Physical Activity: Insufficiently Active (5/19/2024)    Exercise Vital Sign     Days of Exercise per Week: 3 days     Minutes of Exercise per Session: 20 min   Stress: No Stress Concern Present (5/19/2024)    Citizen of [place] of Occupational Health - Occupational Stress Questionnaire     Feeling of Stress : Only a little   Housing Stability: High Risk (3/26/2024)    Housing Stability Vital Sign     Unable to Pay for Housing in the Last Year: No     Number of Places Lived in the Last Year: 1     Unstable Housing in the Last Year: Yes     Past Medical History:   Diagnosis Date    Cataract     Bilateral    Chronic diastolic congestive heart failure 08/25/2020    Dizziness 03/12/2023    Encounter for blood transfusion     History of repair of aneurysm of abdominal aorta using endovascular stent graft     DR Bonds    Hx of psychiatric care     Hypertension     Major depressive disorder, single episode, moderate with anxious distress 01/14/2020    Malignant neoplasm of upper-outer quadrant of right breast in female, estrogen receptor positive 03/14/2019    radiation    Psychiatric problem     Sleep apnea     Sleep difficulties     Stroke 2009    no residual defect    Therapy      Family History   Problem Relation Name Age of Onset    Diabetes Maternal Grandmother Susu Jennings     Diabetes Maternal Grandfather N/A     Diabetes Mother Balbina Tena     Hypertension Mother Balbina Tena     Sickle cell anemia Daughter      Breast cancer Neg Hx      Colon cancer Neg Hx      Ovarian cancer Neg Hx       Past Surgical History:   Procedure Laterality Date    APPENDECTOMY      AXILLARY NODE DISSECTION Right 12/02/2020    Procedure: LYMPHADENECTOMY, AXILLARY;  Surgeon: Artemio  MD Raudel;  Location: [facility];  Service: General;  Laterality: Right;    BIOPSY OF AXILLARY NODE Right 2021    Procedure: BIOPSY, LYMPH NODE, AXILLARY;  Surgeon: Artemio Sutton MD;  Location: [address];  Service: General;  Laterality: Right;    BREAST BIOPSY      2019    BREAST LUMPECTOMY Right     2019     SECTION      x 1    OOPHORECTOMY      right     SENTINEL LYMPH NODE BIOPSY Right 2019    Procedure: BIOPSY, LYMPH NODE, SENTINEL;  Surgeon: Artemio Starks MD;  Location: [facility];  Service: General;  Laterality: Right;    splenic artery aneurysm repair      TONSILLECTOMY       Review of Systems   Constitutional:  Negative for appetite change and fatigue.   HENT:  Negative for mouth sores, rhinorrhea and sore throat.    Eyes: Negative.    Respiratory: Negative.     Cardiovascular:  Positive for leg swelling (lymphedema).   Gastrointestinal:  Negative for constipation, diarrhea, nausea and vomiting.   Genitourinary: Negative.  Positive for hot flashes.   Musculoskeletal:  Positive for arthralgias (mild, improved).        Limited ROM due to muscle tension at surgical site   Integumentary:  Positive for breast mass, breast discharge (clear, from nipples) and breast tenderness. Negative.   Allergic/Immunologic: Negative.    Neurological:  Negative for weakness and numbness.   Hematological:  Does not bruise/bleed easily.   Psychiatric/Behavioral: Negative.     Breast: Positive for mass and tenderness.      Medication List with Changes/Refills   Current Medications    ALPRAZOLAM (XANAX) 0.5 MG TABLET    Take 1 tablet (0.5 mg total) by mouth 2 (two) times daily as needed for Anxiety.    APIXABAN (ELIQUIS) 5 MG TAB    Take 1 tablet (5 mg total) by mouth 2 (two) times daily.    ATORVASTATIN (LIPITOR) 20 MG TABLET    TAKE 1 TABLET EVERY EVENING    CHOLECALCIFEROL, VITAMIN D3, 1,250 MCG (50,000 UNIT) CAPSULE    Take 1 capsule (50,000 Units total) by mouth every 7 days.    CLONIDINE  (CATAPRES) 0.1 MG TABLET    Take 2 tablets (0.2 mg total) by mouth 2 (two) times daily as needed (blood pressure greater than 170/90).    DAPAGLIFLOZIN PROPANEDIOL (FARXIGA) 10 MG TABLET    Take 1 tablet (10 mg total) by mouth once daily.    DICLOFENAC SODIUM (VOLTAREN) 1 % GEL    Apply 2 grams topically once daily.    HYDRALAZINE (APRESOLINE) 100 MG TABLET    Take 1 tablet (100 mg total) by mouth 3 (three) times daily.    LETROZOLE (FEMARA) 2.5 MG TAB    TAKE 1 TABLET ONE TIME DAILY    LEVOCETIRIZINE (XYZAL) 5 MG TABLET    TAKE 1 TABLET EVERY EVENING    LIDOCAINE (LIDODERM) 5 %    Place 2 patches onto the skin once daily. Remove & Discard patch within 12 hours or as directed by MD    MULTIVITAMIN (THERAGRAN) PER TABLET    Take 1 tablet by mouth once daily.    RANOLAZINE (RANEXA) 1,000 MG TB12    Take 1 tablet (1,000 mg total) by mouth 2 (two) times daily.    SACUBITRIL-VALSARTAN (ENTRESTO) 24-26 MG PER TABLET    Take 1 tablet by mouth 2 (two) times daily.    TAMSULOSIN (FLOMAX) 0.4 MG CAP    Take 1 capsule (0.4 mg total) by mouth once daily.    TIZANIDINE (ZANAFLEX) 4 MG TABLET    Take 1 tablet (4 mg total) by mouth 2 (two) times daily as needed.    TORSEMIDE (DEMADEX) 20 MG TAB    Take 2 tablets (40 mg total) by mouth once daily.    TRAMADOL (ULTRAM) 50 MG TABLET    Take 1 tablet (50 mg total) by mouth every 8 (eight) hours as needed for Pain. Greater than 7 day supply medically necessary.    VALSARTAN (DIOVAN) 80 MG TABLET    Take 2 tablets (160 mg total) by mouth 2 (two) times daily.    VENLAFAXINE (EFFEXOR-XR) 37.5 MG 24 HR CAPSULE    Take 1 capsule (37.5 mg total) by mouth once daily. Week 1: Take 1 tab (37.5 mg total) / Week 2: Take 2 tabs (75 mg total)    VENOFER 100 MG IRON/5 ML INJECTION         Objective:     There were no vitals filed for this visit.  Physical Exam     Unable to assess due to virtual visit.    (2) Ambulatory and capable of self care, unable to carry out work activity, up and about > 50%  or waking hours  Assessment:     Problem List Items Addressed This Visit          Hematology    Acute pulmonary embolism     Likely provoked due to hypercoagulable state from breast cancer and obesity. Continue apixaban at 5 mg b.i.d..  Advised that she will be on long-term anticoagulation given recurrent thrombosis history as well as active malignancy.            Oncology    Malignant neoplasm of upper-outer quadrant of right breast in female, estrogen receptor positive - Primary (Chronic)     Diagnosed with Stage IIA (pT2, pN0(sn), cM0, G2, ER+, [place]-, HER2-) invasive lobular carcinoma of right breast, status post lumpectomy with sentinel lymph node biopsy in 2019.  Currently on Femara and tolerating well.       Unfortunately, imaging studies have shown persistently enlarged right subpectoral lymph node as well as FDG avid left posterior cervical and supraclavicular lymph nodes. Multiple biopsies in the past have been nondiagnostic for malignancy.  Case was presented at multi-disciplinary tumor conference with recommendation to continue Arimidex.  Patient was not satisfied with recommendation a requested for 2nd opinion.     She was evaluated by Dr. Sutton in [place] who recommended MRI of breast.     MRI was performed on 2/23/2021 and showed lymph node mass deep to the right pectoralis minor muscle measuring 6.6 x 4.7 x 2.9 cm. There were also adjacent satellite lymph nodes anteriorly to the dominant lymph node measured 1.1 x 0.8 cm and 0.6 x 0.4 cm.  Also described was a mass effect upon the right subclavian vein.  The mass does not in case or envelope the subclavian neurovascular structures.     Given the above findings, recommendation was made to excise the mass if not for diagnostic purposes for pain management.  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 No evidence of metastatic carcinoma or lymphoma.     She was continued on Femara  with plan for short interval PET scan.     Patient had a PET scan on 4/14/2021 that showed interim right axillary lymph node excision with large residual hypermetabolic right subpectoral lymph node mass.  Also noted interval development of subcentimeter mildly FDG avid left posterior cervical and supraclavicular lymph nodes. No FDG avid mediastinal or hilar nodes.No FDG avid pulmonary nodules/masses.      Had diagnostic bilateral mammography on 9/24/2021, results showed a left breast subcentimeter mass at the 9 o'clock position which is new when compared to mammography from 2019.  Will plan a short-interval mammogram in 6 months to re-assess left breast mass.There was no mammographic evidence of disease in the right breast.     Saw Dr. Sutton in [place] who recommended a repeat PET-CT scan. Results from PET-CT scan showed interval enlargement of subpectoral mass on the right with sustained highly FDG avidity.     She continued to complain of shoulder pain and breast discomfort.  CT neck chest with contrast done on 3/17/2022, when compared to PET scan from 10/ 2021, the right subpectoral lymph node has not changed and still measures about 6 cm maximally.     Given continuing discomfort and pain associated with these enlarged lymph nodes.  She was referred to surgical oncologist following extensive conversation, it appears the risk of repeat surgical resection outweighs the benefits.  Decision was made to optimize neuropathic pain management.     Lyrica was initiated for neuropathic pain control. Continue Femara and pain management. Surveillance MMG from 10/2022 was BIRADS 2; recommend repeat in 1 year. Normal bone density on DEXA in 7/2023.          Iron deficiency     Treated with Venofer x 5 weekly doses.           Plan:     Malignant neoplasm of upper-outer quadrant of right breast in female, estrogen receptor positive    Acute pulmonary embolism, unspecified pulmonary embolism type, unspecified whether acute  cor pulmonale present    Iron deficiency    No labs to review; labs scheduled for 7/25/2024.  Patient to keep appointment for CT chest scheduled on 7/25/2024.   Patient to keep appointment with Dr. Littlejohn scheduled for 7/26/2024 to follow up on CT chest results.   Will coordinate with Dr. Littlejohn regarding CT scan results and recommendations.   Continue Femara and Eliquis as prescribed; no refills requested.   Continue vitamin D and calcium supplementation daily.  Surveillance MMG due 10/2024.  Follow up in 12 weeks  with  iron profile, ferritin, CBC, and Comprehensive Metabolic Panel.  Repeat DEXA due 7/2025.   Patient to resume postmastectomy exercises to improve ROM and muscle tension at surgical site.    Per NCCN Guidelines BINV-17:  H&P 1-4 times per year as clinically appropriate for 5 years then annually  Periodic screening for changes in family history and genetic testing indications and referral to genetic counseling as indicated  Educate, monitor, and refer for lymphedema management  MMG every 12 months  Routine imaging of reconstructed breast is not indicated  For patients receiving anthracycline-based therapy, see NCCN Guidelines for Survivorship for echocardiogram recommendations  In the absence of clinical signs and symptoms suggestive of recurrent disease, there is no indication for lab or imaging studies for metastases screening    Route Chart for Scheduling    Med Onc Chart Routing      Follow up with physician    Follow up with CELESTINA 3 months. patient open to virtual   Infusion scheduling note    Injection scheduling note    Labs CBC, CMP, ferritin and iron and TIBC   Scheduling:  Preferred lab:  Lab interval:  in 3 months, 2 days prior   Imaging None      Pharmacy appointment No pharmacy appointment needed      Other referrals       No additional referrals needed             I will review assessment/plan with collaborating physician.        JACKELINE Stewart

## 2024-07-19 DIAGNOSIS — G44.229 CHRONIC TENSION-TYPE HEADACHE, NOT INTRACTABLE: ICD-10-CM

## 2024-07-21 DIAGNOSIS — G44.229 CHRONIC TENSION-TYPE HEADACHE, NOT INTRACTABLE: ICD-10-CM

## 2024-07-21 DIAGNOSIS — F54 PSYCHOLOGICAL FACTORS AFFECTING MEDICAL CONDITION: ICD-10-CM

## 2024-07-22 ENCOUNTER — HOSPITAL ENCOUNTER (OUTPATIENT)
Dept: RADIOLOGY | Facility: HOSPITAL | Age: 74
Discharge: HOME | End: 2024-07-22
Attending: FAMILY MEDICINE
Payer: MEDICARE

## 2024-07-22 ENCOUNTER — OFFICE VISIT (OUTPATIENT)
Dept: HEMATOLOGY/ONCOLOGY | Facility: CLINIC | Age: 74
End: 2024-07-22
Payer: MEDICARE

## 2024-07-22 ENCOUNTER — PATIENT MESSAGE (OUTPATIENT)
Dept: NEUROLOGY | Facility: CLINIC | Age: 74
End: 2024-07-22
Payer: MEDICARE

## 2024-07-22 DIAGNOSIS — C50.411 MALIGNANT NEOPLASM OF UPPER-OUTER QUADRANT OF RIGHT BREAST IN FEMALE, ESTROGEN RECEPTOR POSITIVE: Primary | ICD-10-CM

## 2024-07-22 DIAGNOSIS — Z17.0 MALIGNANT NEOPLASM OF UPPER-OUTER QUADRANT OF RIGHT BREAST IN FEMALE, ESTROGEN RECEPTOR POSITIVE: Primary | ICD-10-CM

## 2024-07-22 DIAGNOSIS — I26.99 ACUTE PULMONARY EMBOLISM, UNSPECIFIED PULMONARY EMBOLISM TYPE, UNSPECIFIED WHETHER ACUTE COR PULMONALE PRESENT: ICD-10-CM

## 2024-07-22 DIAGNOSIS — R22.2 LOCALIZED SWELLING, MASS AND LUMP, TRUNK: Primary | ICD-10-CM

## 2024-07-22 DIAGNOSIS — N61.0 INFLAMMATORY DISORDER OF BREAST: ICD-10-CM

## 2024-07-22 DIAGNOSIS — N64.4 BREAST PAIN, RIGHT: ICD-10-CM

## 2024-07-22 DIAGNOSIS — E61.1 IRON DEFICIENCY: ICD-10-CM

## 2024-07-22 PROCEDURE — C8937 CAD BREAST MRI: HCPCS | Mod: TC,HCNC

## 2024-07-22 PROCEDURE — A9577 INJ MULTIHANCE: HCPCS | Mod: HCNC | Performed by: FAMILY MEDICINE

## 2024-07-22 PROCEDURE — 1125F AMNT PAIN NOTED PAIN PRSNT: CPT | Mod: HCNC,CPTII,95, | Performed by: NURSE PRACTITIONER

## 2024-07-22 PROCEDURE — 4010F ACE/ARB THERAPY RXD/TAKEN: CPT | Mod: HCNC,CPTII,95, | Performed by: NURSE PRACTITIONER

## 2024-07-22 PROCEDURE — 25500020 PHARM REV CODE 255: Mod: HCNC | Performed by: FAMILY MEDICINE

## 2024-07-22 PROCEDURE — 3288F FALL RISK ASSESSMENT DOCD: CPT | Mod: HCNC,CPTII,95, | Performed by: NURSE PRACTITIONER

## 2024-07-22 PROCEDURE — 1101F PT FALLS ASSESS-DOCD LE1/YR: CPT | Mod: HCNC,CPTII,95, | Performed by: NURSE PRACTITIONER

## 2024-07-22 PROCEDURE — 3066F NEPHROPATHY DOC TX: CPT | Mod: HCNC,CPTII,95, | Performed by: NURSE PRACTITIONER

## 2024-07-22 PROCEDURE — 99215 OFFICE O/P EST HI 40 MIN: CPT | Mod: HCNC,95,, | Performed by: NURSE PRACTITIONER

## 2024-07-22 PROCEDURE — 1159F MED LIST DOCD IN RCRD: CPT | Mod: HCNC,CPTII,95, | Performed by: NURSE PRACTITIONER

## 2024-07-22 PROCEDURE — 77049 MRI BREAST C-+ W/CAD BI: CPT | Mod: 26,HCNC,, | Performed by: RADIOLOGY

## 2024-07-22 PROCEDURE — 1160F RVW MEDS BY RX/DR IN RCRD: CPT | Mod: HCNC,CPTII,95, | Performed by: NURSE PRACTITIONER

## 2024-07-22 PROCEDURE — 1157F ADVNC CARE PLAN IN RCRD: CPT | Mod: HCNC,CPTII,95, | Performed by: NURSE PRACTITIONER

## 2024-07-22 RX ORDER — ALPRAZOLAM 0.5 MG/1
0.5 TABLET ORAL 2 TIMES DAILY PRN
Qty: 60 TABLET | Refills: 0 | Status: SHIPPED | OUTPATIENT
Start: 2024-07-22 | End: 2024-08-22

## 2024-07-22 RX ADMIN — GADOBENATE DIMEGLUMINE 15 ML: 529 INJECTION, SOLUTION INTRAVENOUS at 11:07

## 2024-07-22 NOTE — ASSESSMENT & PLAN NOTE
Diagnosed with Stage IIA (pT2, pN0(sn), cM0, G2, ER+, [place]-, HER2-) invasive lobular carcinoma of right breast, status post lumpectomy with sentinel lymph node biopsy in 2019.  Currently on Femara and tolerating well.       Unfortunately, imaging studies have shown persistently enlarged right subpectoral lymph node as well as FDG avid left posterior cervical and supraclavicular lymph nodes. Multiple biopsies in the past have been nondiagnostic for malignancy.  Case was presented at multi-disciplinary tumor conference with recommendation to continue Arimidex.  Patient was not satisfied with recommendation a requested for 2nd opinion.     She was evaluated by Dr. Sutton in [place] who recommended MRI of breast.     MRI was performed on 2/23/2021 and showed lymph node mass deep to the right pectoralis minor muscle measuring 6.6 x 4.7 x 2.9 cm. There were also adjacent satellite lymph nodes anteriorly to the dominant lymph node measured 1.1 x 0.8 cm and 0.6 x 0.4 cm.  Also described was a mass effect upon the right subclavian vein.  The mass does not in case or envelope the subclavian neurovascular structures.     Given the above findings, recommendation was made to excise the mass if not for diagnostic purposes for pain management.  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 No evidence of metastatic carcinoma or lymphoma.     She was continued on Femara with plan for short interval PET scan.     Patient had a PET scan on 4/14/2021 that showed interim right axillary lymph node excision with large residual hypermetabolic right subpectoral lymph node mass.  Also noted interval development of subcentimeter mildly FDG avid left posterior cervical and supraclavicular lymph nodes. No FDG avid mediastinal or hilar nodes.No FDG avid pulmonary nodules/masses.      Had diagnostic bilateral mammography on 9/24/2021, results showed a left breast  subcentimeter mass at the 9 o'clock position which is new when compared to mammography from 2019.  Will plan a short-interval mammogram in 6 months to re-assess left breast mass.There was no mammographic evidence of disease in the right breast.     Saw Dr. Sutton in [place] who recommended a repeat PET-CT scan. Results from PET-CT scan showed interval enlargement of subpectoral mass on the right with sustained highly FDG avidity.     She continued to complain of shoulder pain and breast discomfort.  CT neck chest with contrast done on 3/17/2022, when compared to PET scan from 10/ 2021, the right subpectoral lymph node has not changed and still measures about 6 cm maximally.     Given continuing discomfort and pain associated with these enlarged lymph nodes.  She was referred to surgical oncologist following extensive conversation, it appears the risk of repeat surgical resection outweighs the benefits.  Decision was made to optimize neuropathic pain management.     Lyrica was initiated for neuropathic pain control. Continue Femara and pain management. Surveillance MMG from 10/2022 was BIRADS 2; recommend repeat in 1 year. Normal bone density on DEXA in 7/2023.

## 2024-07-25 ENCOUNTER — PATIENT MESSAGE (OUTPATIENT)
Dept: NEUROLOGY | Facility: CLINIC | Age: 74
End: 2024-07-25
Payer: MEDICARE

## 2024-07-25 ENCOUNTER — TELEPHONE (OUTPATIENT)
Dept: NEUROLOGY | Facility: CLINIC | Age: 74
End: 2024-07-25
Payer: MEDICARE

## 2024-07-25 ENCOUNTER — TELEPHONE (OUTPATIENT)
Dept: CARDIOLOGY | Facility: CLINIC | Age: 74
End: 2024-07-25
Payer: MEDICARE

## 2024-07-25 ENCOUNTER — HOSPITAL ENCOUNTER (OUTPATIENT)
Dept: RADIOLOGY | Facility: HOSPITAL | Age: 74
Discharge: HOME | End: 2024-07-25
Attending: FAMILY MEDICINE
Payer: MEDICARE

## 2024-07-25 DIAGNOSIS — R22.2 LOCALIZED SWELLING, MASS AND LUMP, TRUNK: ICD-10-CM

## 2024-07-25 DIAGNOSIS — G43.719 INTRACTABLE CHRONIC MIGRAINE WITHOUT AURA AND WITHOUT STATUS MIGRAINOSUS: Primary | ICD-10-CM

## 2024-07-25 PROCEDURE — 25500020 PHARM REV CODE 255: Mod: HCNC | Performed by: FAMILY MEDICINE

## 2024-07-25 PROCEDURE — 71260 CT THORAX DX C+: CPT | Mod: 26,HCNC,, | Performed by: RADIOLOGY

## 2024-07-25 PROCEDURE — 71260 CT THORAX DX C+: CPT | Mod: TC,HCNC

## 2024-07-25 RX ORDER — BUTALBITAL, ACETAMINOPHEN AND CAFFEINE 50; 325; 40 MG/1; MG/1; MG/1
1 TABLET ORAL EVERY 6 HOURS PRN
Qty: 20 TABLET | Refills: 0 | Status: SHIPPED | OUTPATIENT
Start: 2024-07-25 | End: 2024-07-25 | Stop reason: ALTCHOICE

## 2024-07-25 RX ORDER — UBROGEPANT 50 MG/1
50 TABLET ORAL
Qty: 9 TABLET | Refills: 0 | Status: SHIPPED | OUTPATIENT
Start: 2024-07-25 | End: 2024-08-24

## 2024-07-25 RX ORDER — VENLAFAXINE HYDROCHLORIDE 75 MG/1
75 CAPSULE, EXTENDED RELEASE ORAL DAILY
Qty: 30 CAPSULE | Refills: 0 | Status: SHIPPED | OUTPATIENT
Start: 2024-07-25 | End: 2024-08-24

## 2024-07-25 RX ADMIN — IOHEXOL 75 ML: 350 INJECTION, SOLUTION INTRAVENOUS at 09:07

## 2024-07-25 NOTE — TELEPHONE ENCOUNTER
Ms. Castro has been trying to get the patient approved to take Ubrelvy through the patient's insurance, there was a denial so they PCP tried ordering it and the PA did not go through for that as well.  Ms. Castro updated the diagnosis associated with the medication, per her and the patient's previous telephone encounter.     I attempted to create a new PA for the new order, but due to there being a denial in the past 60 days, we had to submit an appeal.    I called [facility]'s claims department and spoke with Fabi, creating the appeal.     Fabi stated that she submitted this as an expedited review and provided an appeal reference number of [id number] . She advised that we should receive a decision within 72 hours at the latest. I verbalized understanding.

## 2024-07-25 NOTE — TELEPHONE ENCOUNTER
Called pt per Ms. Castro and scheduled a follow up to go over everything and discuss medications. Got the patient scheduled for the next week. Patient verbalized understanding.

## 2024-07-26 ENCOUNTER — OFFICE VISIT (OUTPATIENT)
Dept: INTERNAL MEDICINE | Facility: CLINIC | Age: 74
End: 2024-07-26
Payer: MEDICARE

## 2024-07-26 VITALS
HEIGHT: 72 IN | TEMPERATURE: 98 F | WEIGHT: 293 LBS | BODY MASS INDEX: 39.68 KG/M2 | RESPIRATION RATE: 20 BRPM | OXYGEN SATURATION: 95 % | HEART RATE: 64 BPM

## 2024-07-26 DIAGNOSIS — I95.9 SYMPTOMATIC HYPOTENSION: Primary | ICD-10-CM

## 2024-07-26 PROCEDURE — 99999 PR PBB SHADOW E&M-EST. PATIENT-LVL IV: CPT | Mod: PBBFAC,HCNC,, | Performed by: FAMILY MEDICINE

## 2024-07-26 NOTE — PROGRESS NOTES
Subjective:       Patient ID: Susu Luther is a 74 y.o. female.    Chief Complaint: Follow-up (1 Month), Headache, Nausea, Dizziness, and Shortness of Breath    Susu Luther is a 74 y.o. female who presents to clinic for Follow-up (1 Month), Headache, Nausea, Dizziness, and Shortness of Breath    Blood pressure is low today. Reports she only took 1 of her blood pressure medications today due to being dizzy. She reports mild chest tightness. Has had stress this entire week due to various doctors appointments. Last week went to the urgent care and was told she was having an anxiety attack and discharged.       Review of Systems   Constitutional:  Negative for chills and fever.   HENT:  Negative for congestion, rhinorrhea and sore throat.    Respiratory:  Negative for cough, shortness of breath and wheezing.    Cardiovascular:  Negative for chest pain, palpitations and leg swelling.   Gastrointestinal:  Negative for abdominal pain, constipation, diarrhea, nausea and vomiting.   Genitourinary:  Negative for dysuria, frequency and urgency.   Neurological:  Negative for headaches.   Psychiatric/Behavioral:  Negative for dysphoric mood.        Objective:      Physical Exam  Vitals reviewed.   HENT:      Head: Normocephalic and atraumatic.      Nose: No congestion or rhinorrhea.   Eyes:      General: No scleral icterus.        Right eye: No discharge.         Left eye: No discharge.      Extraocular Movements: Extraocular movements intact.      Conjunctiva/sclera: Conjunctivae normal.      Pupils: Pupils are equal, round, and reactive to light.   Cardiovascular:      Rate and Rhythm: Normal rate and regular rhythm.      Heart sounds: No murmur heard.     No friction rub. No gallop.   Pulmonary:      Effort: Pulmonary effort is normal. No respiratory distress.      Breath sounds: Normal breath sounds. No stridor. No wheezing or rhonchi.   Abdominal:      General: Bowel sounds are normal.      Palpations:  Abdomen is soft.   Musculoskeletal:      Right lower leg: Edema present.      Left lower leg: Edema present.   Skin:     General: Skin is warm.   Neurological:      General: No focal deficit present.      Mental Status: She is alert and oriented to person, place, and time.      Cranial Nerves: No cranial nerve deficit.      Sensory: No sensory deficit.      Motor: No weakness.      Coordination: Coordination normal.      Gait: Gait normal.      Deep Tendon Reflexes: Reflexes normal.   Psychiatric:         Mood and Affect: Mood normal.         Behavior: Behavior normal.         Assessment:       1. Symptomatic hypotension        Plan:   1. Symptomatic hypotension  Acute, unclear etiology, DDX infectious etiology vs dehydration vs cardiac etiology, EKG in clinic with left axis deviation, LVH without ST/T wave changes   -     X-Ray Chest PA And Lateral; Future; Expected date: 07/26/2024  -     IN OFFICE EKG 12-LEAD (to [place])    Patient refused transport to ED via EMS, would like to be transported via private vehicle. Pt friend arrived at 1:20 PM to provide transportation to the ED. Patient was able to talk in full sentences, able to ambulate with assistance. Wheeled out of clinic in wheelchair and assisted into vehicle.    Called [facility] ED triage to give report, spoke with Padmini RN working triage, informed there is currently a 7 hour wait time with 3 ambulances in route, will call [facility] ED triage and give report. Report given to ED triage nurse report given and will await arrival.     Future Appointments   Date Time Provider Department Center   7/30/2024  1:00 PM Matthew Reyes NP [facility] NEURO [facility]   7/31/2024 11:00 AM Alexus Rios NP [facility]   9/4/2024 12:45 PM Barry Edwards MD [facility] IN PN [facility]   10/18/2024 12:35 PM LABORATORY, [facility] LAB [facility]   10/22/2024  3:00 PM Jaylyn Kraft FNP [facility] HEM ONC [facility]   11/15/2024  3:30 PM Elier Hernandez MD [facility]   1/16/2025 12:30 PM [facility]       Rubi Littlejohn MD  [facility] [address]  [place] [address]  [phone]

## 2024-07-29 ENCOUNTER — PATIENT MESSAGE (OUTPATIENT)
Dept: INTERNAL MEDICINE | Facility: CLINIC | Age: 74
End: 2024-07-29
Payer: MEDICARE

## 2024-07-29 ENCOUNTER — TELEPHONE (OUTPATIENT)
Dept: NEUROLOGY | Facility: CLINIC | Age: 74
End: 2024-07-29
Payer: MEDICARE

## 2024-07-29 ENCOUNTER — TELEPHONE (OUTPATIENT)
Dept: INTERNAL MEDICINE | Facility: CLINIC | Age: 74
End: 2024-07-29
Payer: MEDICARE

## 2024-07-29 ENCOUNTER — PATIENT MESSAGE (OUTPATIENT)
Dept: NEUROLOGY | Facility: CLINIC | Age: 74
End: 2024-07-29
Payer: MEDICARE

## 2024-07-29 NOTE — TELEPHONE ENCOUNTER
Called pt to confirm appt. Pt stated that she needed to cancel and call back to reschedule, as she just got out of the hospital yesterday. I canceled her appt. She verbalized understanding.

## 2024-07-29 NOTE — TELEPHONE ENCOUNTER
"Called and spoke with Mrs. Luther regarding her medication refill request. Patient reports that her headaches are worsening and is requesting refills of Fioricet to alleviate Migraines. Patient also reports that she initiated preventative treatment (Effexor-XR) on Thursday this week and has not been compliant with taking medication daily. She reports that she has taken Ubrelvy 5 times in the past week. Informed patient on the importance of limiting abortive therapy to less than 3 times per week to prevent re-bound headaches and to avoid consecutive and excessive use of Fioricet to prevent re-bound headaches. Patient reports that she obtained Fioricet during her ER visit for Hypotension as was directed to request refills from Neurologist for daily use. Informed patient that Fioricet was prescribed for abortive therapy x 2 times until Ubrelvy came in via mail order and that it is not intended for long-term use.     Patient is also instructed that Effexor-XR takes at least 6 weeks to be effective and instructed her on the importance of medication compliance. Offered patient referral to pain management for Migraines. She declined and states "I need the Fioricet". Patient exhibits aggression towards provider during explanation of medications for preventative and abortive therapy. She hangs up in the middle of conversation and states, "I do not need your help".       "

## 2024-07-30 ENCOUNTER — PATIENT MESSAGE (OUTPATIENT)
Dept: CARDIOLOGY | Facility: CLINIC | Age: 74
End: 2024-07-30
Payer: MEDICARE

## 2024-07-31 ENCOUNTER — OFFICE VISIT (OUTPATIENT)
Dept: INTERNAL MEDICINE | Facility: CLINIC | Age: 74
End: 2024-07-31
Payer: MEDICARE

## 2024-07-31 ENCOUNTER — TELEPHONE (OUTPATIENT)
Dept: INTERNAL MEDICINE | Facility: CLINIC | Age: 74
End: 2024-07-31
Payer: MEDICARE

## 2024-07-31 VITALS
DIASTOLIC BLOOD PRESSURE: 104 MMHG | HEART RATE: 59 BPM | SYSTOLIC BLOOD PRESSURE: 138 MMHG | OXYGEN SATURATION: 96 % | RESPIRATION RATE: 18 BRPM | WEIGHT: 293 LBS | HEIGHT: 72 IN | BODY MASS INDEX: 39.68 KG/M2 | TEMPERATURE: 97 F

## 2024-07-31 DIAGNOSIS — K82.9: ICD-10-CM

## 2024-07-31 DIAGNOSIS — G44.229 CHRONIC TENSION-TYPE HEADACHE, NOT INTRACTABLE: ICD-10-CM

## 2024-07-31 DIAGNOSIS — R40.0 DAYTIME SOMNOLENCE: ICD-10-CM

## 2024-07-31 DIAGNOSIS — G47.33 OSA (OBSTRUCTIVE SLEEP APNEA): Chronic | ICD-10-CM

## 2024-07-31 DIAGNOSIS — E04.2 MULTINODULAR THYROID: ICD-10-CM

## 2024-07-31 DIAGNOSIS — R42 DIZZINESS: Primary | ICD-10-CM

## 2024-07-31 DIAGNOSIS — O26.611: ICD-10-CM

## 2024-07-31 PROCEDURE — 1157F ADVNC CARE PLAN IN RCRD: CPT | Mod: HCNC,CPTII,S$GLB, | Performed by: FAMILY MEDICINE

## 2024-07-31 PROCEDURE — 1101F PT FALLS ASSESS-DOCD LE1/YR: CPT | Mod: HCNC,CPTII,S$GLB, | Performed by: FAMILY MEDICINE

## 2024-07-31 PROCEDURE — 3288F FALL RISK ASSESSMENT DOCD: CPT | Mod: HCNC,CPTII,S$GLB, | Performed by: FAMILY MEDICINE

## 2024-07-31 PROCEDURE — 99214 OFFICE O/P EST MOD 30 MIN: CPT | Mod: HCNC,S$GLB,, | Performed by: FAMILY MEDICINE

## 2024-07-31 PROCEDURE — 3075F SYST BP GE 130 - 139MM HG: CPT | Mod: HCNC,CPTII,S$GLB, | Performed by: FAMILY MEDICINE

## 2024-07-31 PROCEDURE — 3008F BODY MASS INDEX DOCD: CPT | Mod: HCNC,CPTII,S$GLB, | Performed by: FAMILY MEDICINE

## 2024-07-31 PROCEDURE — 4010F ACE/ARB THERAPY RXD/TAKEN: CPT | Mod: HCNC,CPTII,S$GLB, | Performed by: FAMILY MEDICINE

## 2024-07-31 PROCEDURE — 3044F HG A1C LEVEL LT 7.0%: CPT | Mod: HCNC,CPTII,S$GLB, | Performed by: FAMILY MEDICINE

## 2024-07-31 PROCEDURE — 1159F MED LIST DOCD IN RCRD: CPT | Mod: HCNC,CPTII,S$GLB, | Performed by: FAMILY MEDICINE

## 2024-07-31 PROCEDURE — 99999 PR PBB SHADOW E&M-EST. PATIENT-LVL V: CPT | Mod: PBBFAC,HCNC,, | Performed by: FAMILY MEDICINE

## 2024-07-31 PROCEDURE — 3066F NEPHROPATHY DOC TX: CPT | Mod: HCNC,CPTII,S$GLB, | Performed by: FAMILY MEDICINE

## 2024-07-31 PROCEDURE — 3080F DIAST BP >= 90 MM HG: CPT | Mod: HCNC,CPTII,S$GLB, | Performed by: FAMILY MEDICINE

## 2024-07-31 RX ORDER — BUTALBITAL, ASPIRIN, AND CAFFEINE 325; 50; 40 MG/1; MG/1; MG/1
1 CAPSULE ORAL EVERY 6 HOURS PRN
COMMUNITY
End: 2024-07-31 | Stop reason: SDUPTHER

## 2024-07-31 RX ORDER — MECLIZINE HCL 12.5 MG 12.5 MG/1
12.5 TABLET ORAL 3 TIMES DAILY PRN
Qty: 30 TABLET | Refills: 3 | Status: SHIPPED | OUTPATIENT
Start: 2024-07-31

## 2024-08-05 ENCOUNTER — OFFICE VISIT (OUTPATIENT)
Dept: CARDIOLOGY | Facility: CLINIC | Age: 74
End: 2024-08-05
Payer: MEDICARE

## 2024-08-05 VITALS
DIASTOLIC BLOOD PRESSURE: 99 MMHG | WEIGHT: 293 LBS | SYSTOLIC BLOOD PRESSURE: 142 MMHG | OXYGEN SATURATION: 95 % | HEIGHT: 72 IN | HEART RATE: 65 BPM | BODY MASS INDEX: 39.68 KG/M2

## 2024-08-05 DIAGNOSIS — I10 HYPERTENSION, UNSPECIFIED TYPE: Primary | ICD-10-CM

## 2024-08-05 DIAGNOSIS — I71.41 PARARENAL ABDOMINAL AORTIC ANEURYSM (AAA) WITHOUT RUPTURE: ICD-10-CM

## 2024-08-05 DIAGNOSIS — G47.33 OSA (OBSTRUCTIVE SLEEP APNEA): Chronic | ICD-10-CM

## 2024-08-05 DIAGNOSIS — I50.32 CHRONIC DIASTOLIC CONGESTIVE HEART FAILURE: ICD-10-CM

## 2024-08-05 DIAGNOSIS — R94.31 PROLONGED Q-T INTERVAL ON ECG: ICD-10-CM

## 2024-08-05 PROCEDURE — 99214 OFFICE O/P EST MOD 30 MIN: CPT | Mod: HCNC,S$GLB,,

## 2024-08-05 PROCEDURE — 3288F FALL RISK ASSESSMENT DOCD: CPT | Mod: HCNC,CPTII,S$GLB,

## 2024-08-05 PROCEDURE — 3044F HG A1C LEVEL LT 7.0%: CPT | Mod: HCNC,CPTII,S$GLB,

## 2024-08-05 PROCEDURE — 3066F NEPHROPATHY DOC TX: CPT | Mod: HCNC,CPTII,S$GLB,

## 2024-08-05 PROCEDURE — 1159F MED LIST DOCD IN RCRD: CPT | Mod: HCNC,CPTII,S$GLB,

## 2024-08-05 PROCEDURE — 1125F AMNT PAIN NOTED PAIN PRSNT: CPT | Mod: HCNC,CPTII,S$GLB,

## 2024-08-05 PROCEDURE — 1160F RVW MEDS BY RX/DR IN RCRD: CPT | Mod: HCNC,CPTII,S$GLB,

## 2024-08-05 PROCEDURE — 3080F DIAST BP >= 90 MM HG: CPT | Mod: HCNC,CPTII,S$GLB,

## 2024-08-05 PROCEDURE — 1157F ADVNC CARE PLAN IN RCRD: CPT | Mod: HCNC,CPTII,S$GLB,

## 2024-08-05 PROCEDURE — 99999 PR PBB SHADOW E&M-EST. PATIENT-LVL V: CPT | Mod: PBBFAC,HCNC,,

## 2024-08-05 PROCEDURE — 3077F SYST BP >= 140 MM HG: CPT | Mod: HCNC,CPTII,S$GLB,

## 2024-08-05 PROCEDURE — 3008F BODY MASS INDEX DOCD: CPT | Mod: HCNC,CPTII,S$GLB,

## 2024-08-05 PROCEDURE — 1101F PT FALLS ASSESS-DOCD LE1/YR: CPT | Mod: HCNC,CPTII,S$GLB,

## 2024-08-05 PROCEDURE — 4010F ACE/ARB THERAPY RXD/TAKEN: CPT | Mod: HCNC,CPTII,S$GLB,

## 2024-08-05 RX ORDER — BUTALBITAL, ASPIRIN, AND CAFFEINE 325; 50; 40 MG/1; MG/1; MG/1
1 CAPSULE ORAL EVERY 6 HOURS PRN
Qty: 30 CAPSULE | Refills: 2 | Status: SHIPPED | OUTPATIENT
Start: 2024-08-05 | End: 2024-08-08 | Stop reason: SDUPTHER

## 2024-08-05 RX ORDER — CARVEDILOL 3.12 MG/1
3.12 TABLET ORAL 2 TIMES DAILY WITH MEALS
Qty: 180 TABLET | Refills: 3 | Status: SHIPPED | OUTPATIENT
Start: 2024-08-05 | End: 2025-08-05

## 2024-08-05 RX ORDER — CARVEDILOL 3.12 MG/1
3.12 TABLET ORAL 2 TIMES DAILY WITH MEALS
Qty: 180 TABLET | Refills: 3 | Status: SHIPPED | OUTPATIENT
Start: 2024-08-05 | End: 2024-08-05 | Stop reason: SDUPTHER

## 2024-08-06 ENCOUNTER — TELEPHONE (OUTPATIENT)
Dept: INTERNAL MEDICINE | Facility: CLINIC | Age: 74
End: 2024-08-06
Payer: MEDICARE

## 2024-08-06 DIAGNOSIS — G44.229 CHRONIC TENSION-TYPE HEADACHE, NOT INTRACTABLE: ICD-10-CM

## 2024-08-06 RX ORDER — RIZATRIPTAN BENZOATE 10 MG/1
10 TABLET, ORALLY DISINTEGRATING ORAL
Qty: 15 TABLET | Refills: 0 | Status: SHIPPED | OUTPATIENT
Start: 2024-08-06

## 2024-08-08 ENCOUNTER — HOSPITAL ENCOUNTER (OUTPATIENT)
Dept: RADIOLOGY | Facility: HOSPITAL | Age: 74
Discharge: HOME | End: 2024-08-08
Payer: MEDICARE

## 2024-08-08 ENCOUNTER — TELEPHONE (OUTPATIENT)
Dept: CARDIOLOGY | Facility: CLINIC | Age: 74
End: 2024-08-08
Payer: MEDICARE

## 2024-08-08 DIAGNOSIS — I10 HYPERTENSION, UNSPECIFIED TYPE: ICD-10-CM

## 2024-08-08 PROCEDURE — 93975 VASCULAR STUDY: CPT | Mod: TC,HCNC

## 2024-08-08 RX ORDER — BUTALBITAL, ASPIRIN, AND CAFFEINE 325; 50; 40 MG/1; MG/1; MG/1
1 CAPSULE ORAL EVERY 6 HOURS PRN
Qty: 30 CAPSULE | Refills: 2 | Status: SHIPPED | OUTPATIENT
Start: 2024-08-08

## 2024-08-13 DIAGNOSIS — G44.229 CHRONIC TENSION-TYPE HEADACHE, NOT INTRACTABLE: ICD-10-CM

## 2024-08-13 RX ORDER — VENLAFAXINE HYDROCHLORIDE 75 MG/1
75 CAPSULE, EXTENDED RELEASE ORAL DAILY
Qty: 30 CAPSULE | Refills: 0 | Status: CANCELLED | OUTPATIENT
Start: 2024-08-13 | End: 2024-09-12

## 2024-08-14 ENCOUNTER — PATIENT MESSAGE (OUTPATIENT)
Dept: NEUROLOGY | Facility: CLINIC | Age: 74
End: 2024-08-14
Payer: MEDICARE

## 2024-08-14 NOTE — TELEPHONE ENCOUNTER
Good-morning Otilia,     Can you please set Mrs. Luther up for a virtual appointment to reassess headaches?    Thank you!    EUGENE Castro

## 2024-08-14 NOTE — TELEPHONE ENCOUNTER
Called pt per Ms. Castro to schedule virtual f/u appt. Scheduled appt with pt. She verbalized understanding.

## 2024-08-14 NOTE — TELEPHONE ENCOUNTER
Yes please cancel the refill request.     I would like to make sure it is working well for her and she is not experiencing any side effects before refilling.     Her old refill should last her through 08/24/2024, and I see that her appointment is for this Friday.     Thank you!

## 2024-08-14 NOTE — PROGRESS NOTES
"Subjective:       Patient ID: Susu Luther is a 74 y.o. female.    PMH: Abdominal Aortic Aneurysm / Left Eye Vitreous Hemorrhage / Iron Deficiency / Chronic Pain / CHF / Neuropathic Pain / Thyroid Nodules / Pre-Dm / PE / OA / CKD Stage 3 / Obesity / NILS and other medical conditions.     Chief Complaint: "Headaches"      HPI    The patient presented on 2024 / 2024 / 2024 for evaluation of "Headaches". Patient presents today for a follow-up evaluation and recommendation of "Headaches".     The originating site (patient location) is: Home.      The distant site (neurologist location) is: Neurology Clinic at [facility].      The chief complaint leading to consultation is: "Headaches"      Visit type: Virtual visit with synchronous audio and video.      Consent: The patient verbally consented to participating in the video visit and informed that may decline to receive medical services by telemedicine and may withdraw from such care at any time.      I discussed with the patient the nature of our telemedicine visits, that:      I  would evaluate the patient and recommend diagnostics and treatments based on my assessment.    Our sessions are not being recorded and that personal health information is protected.    Our team would provide follow up care in person if/when the patient needs it.    Virtual (video/telemedicine) visits have significant limitations. A telemedicine exam is primarily focused on the history and what I can observe. Several critical parts of the neurological exam cannot be performed.     Interval History: (2024) - There was a plan to wean off of Gabapentin -wean to BID x a week then 1 per a week then off. She was started on Topamax 25 mg PO BID for headache prevention.      Started:  about a year or so.   Describes:  pressure  Timin to 12 hours.   Frequency: 3 to 4 times per week.   Pain:  4 to 7/ 10.   Location: Neck / front of the head.   Family: None.   Medications: " "NSAID's PRN / Fioricet's.   Worsen: physical activities.   Alleviated: relaxation /   Associated symptoms: dizziness ( lightheaded / spinning )   Triggers: none.   Prodrome symptoms: None           Referred by PCP.        HTN has been a problem.        Dizziness - lasting few seconds ( 30 to 40 ).       No relation between HA"s and dizziness.       Denied Nausea / phonophobia / photophobia.       No ED visit for the headaches.       Interval History: (05/28/2024) - Discontinued Coreg 6.25 mg BID and Topamax 25 mg PO BID. Patient started on Inderal-LA 60 mg PO Daily / PT Continued /     Patient reports that she finished Gabapentin last month. She is no longer taking it. No side effects per patient.     Patient reports that she stopped taking Topamax 25 mg PO BID due to side effects of diarrhea. Patient reports seeing the kidney doctor who reported that the Topamax inducing diarrhea can cause dehydration and cause kidney injury. Patient repots that she stopped taking Topamax on the 05/09/2024. She reports that it did help prevent headaches from coming, but she could not tolerate the medication due to diarrhea.     Headaches: Patient reports a headache during the visit.     Started: over 5 years ago, worse within the last 3 years ago  Describes: Throbbing   Timing: Worse in the morning upon awakening / Duration of 1 hour- 12 hours   Frequency: 3 times per day for regular headaches / 2 migraine type headaches per day  Pain: 5/10-10/10  Location: Bilateral frontal / Right-sided neck pain (Muscular)  Family: None  Medications: Fioricet- helps / Tylenol- does not help / Topamax-experienced side effects of diarrhea /   Worsen: High blood pressure / Stress  Alleviated: Cold compress helps / Lay down in a dark and quiet room /   Associated symptoms: Sensitivity to light and sound / Dizziness / Nausea /   Triggers: Stress / High blood pressure  Prodrome symptoms: None    Patient reports a CVA-2009  No recent falls  Patient " "reports no ER visits for headaches within the last year.     Weight loss: Patient reports losing about 5lbs since starting Topamax, but has not noticed any changes in weight since stopping Topamax. Patient reports following with Dietitian for weight management.     Eye clinic: Patient reports last eye clinic evaluation was over a year ago. She reports that no abnormalities were noted. Her next appointment is in July 2024. Patient reports blurry vision while reading from far distances and up close. She denies double vision / eye pain / eye discharge.     CPAP- Patient reports she is not compliant with CPAP Machine. She reports having a recall on her old machine and being sent a new machine-patient reports she can't breathe well and the new machine does not fit correctly. Patient is offered a referral for at home sleep study and referral to Sleep Disorders. She reports she is in contact with her CPAP company representative to see if she can get a new machine. Patient reports that if new CPAP machine does not work she will consider referral for at home sleep study and referral to Sleep Disorders in the future.     Dizziness: Patient reports dizziness is associated with headaches and high blood pressure only. Frequency 3 times per day, duration of less than 30 seconds.      HTN: Patient reports that at home blood pressures run about 160-180/. She reports associated headaches with high blood pressure. Patient reports compliance with blood pressure medications without side effects.     Weakness of both lower extremities chronic x 1 year and currently in PT- improved with PT per patient- followed by PCP / Cardiologist / Hematology / Pain management / per patient      Telephone Consult: (06/07/2024) - Patient reported "worsening headaches and nausea" (see note). Patient started on Zofran-ODT 4 mg PO Q8H PRN Nausea / Fioricet -40 mg PO Q6H PRN Headaches x 2 week supply / Ubrelvy 50 mg PO PRN Migraine " "      Interval History: (06/25/2024) - Discontinued Inderal-LA 60 mg PO Daily. Continued Zofran-ODT 4 mg PO Q8H PRN / Ubrelvy 50 mg PO PRN / Effexor-XR 75 mg PO Daily / Refill Fioricet -40 mg PO Q6H PRN Headaches x 2 week supply. Ordered ISAC / Vitamin D / Folate / B-12 / TSH / Vestibular Therapy.     -Headaches are the same since last visit.   -Location is still Bilateral frontal / Right-sided neck pain (Muscular)  -Frequency of tension type is 3 times per day and migraine type is 3 per day.   -Duration of tension type is 1 hour-2 hours and migraine type is 2 hours.   -Still "throbbing" in nature.  -Pain intensity is 9/10  -Associated symptoms still include Sensitivity to light and sound / Dizziness / Nausea /             New Issues: (08/16/2024) -     Headaches:     Headaches are the same since last visit. Patient reports having a headaches during the visit.   Location is still Bilateral frontal / Right-sided neck pain (Muscular)  Headache auras include none.   Frequency decreased -tension type is every other day and migraine type is 3 per week.   Duration - increased of tension type is 3-4 hours and migraine type is 12 hours.   Still "throbbing" in nature.  Pain intensity is 9/10  Worsened by High blood pressure / Stress -Patient is worried that she will have another stroke due to high BP  Triggered by High blood pressure / Stress   Alleviated with cold compress helps / Lay down in a dark and quiet room /   Associated symptoms still include Sensitivity to light and sound / Dizziness / Nausea     -Patient does report changes in vision. Patient reports seeing eye clinic last month (07/2024) no abnormalities noted per patient. Her eye glasses prescription was updated. Next eye clinic appointment scheduled for 1 year.            Preventative therapies (tried and failed):     -Effexor-XR 75 mg Daily - not effective for Migraine Prevention therapy - patient requesting to stay on Effexor for hot flashes as it is " "working for that.     -Topamax 25 mg PO BID- effective- could not tolerate due to side effects of Diarrhea.     -Inderal-LA 60 mg PO Daily - not effective - could not tolerate due to side effects of orthostatic hypotension - lightheaded with positional changes        Abortive therapies (tried and failed):      -Current: Maxalt 10 mg PO PRN - ordered via PCP - patient did not obtain yet due to delay via mail order - is not currently taking, but plans to start therapy.      -Current: Fiorinal -40 mg PO Q6H PRN- prescribed by PCP - patient reports taking it 2 times per day PRN - patient reports taking it 4 times out of the week. -Fioricet- Effective, but patient used excessively.     -Current: Antivert 12.5 mg PO TID PRN - helps with dizziness    -Current: Zanaflex 4 mg PO BID PRN - helps with Neck Tightness associated with Migraines    -Ubrelvy 50 mg Q8H PRN - patient reports that she only received 5 tabs - the order was written for 9 tabs per month - patient reports it was effective but she used all of the medication and ran out. Patient is not receptive to prescription reweal          CPAP: Patient is awaiting delivery of new machine (old one recalled). Appointment in 09/2024 for Sleep Study.     Weight loss: Patient reports 9lb weight loss since last visit. She is still following with Dietitian for weight management.     Dizziness: Patient reports dizziness is the same. Frequency has decreased to a few times per week  still less than 30 seconds. She reports associated symptoms of ringing in ears / nausea / room spinning / blurry vision / double vision / positional changes do not make it worse - symptoms worse with high blood pressure readings per patient. Patient fell on Saturday post discharge from Hospital due to "Dizziness" - no known injuries per patient. No head injury.     HTN: Patient reports at home BP readings are: SBP: 168-180 DBP:  - Reffered by PCP to ER for elevated BP reading in office " "last Tuesday - patient was admitted and discharged on Saturday. Patient was given IV Fluids / Clonidine / BP medications adjusted per patient- followed by PCP and Cardiology. She reports associated headaches with high blood pressure. Patient reports compliance with blood pressure medications without side effects. She reports completing an online BP log with PCP. Cardiology is following and medications were adjusted per patient.     Weakness: Weakness of both lower extremities chronic x 1 year and currently in PT- improved with PT per patient- followed by PCP / Cardiologist / Hematology / Pain management / per patient          Review of Systems   HENT:  Positive for tinnitus.    Eyes:  Positive for photophobia and visual disturbance.   Gastrointestinal:  Positive for nausea.   Musculoskeletal:  Positive for neck pain.   Neurological:  Positive for dizziness, weakness and headaches.        Patient report that dizziness has associated symptoms of spinning sensation "room is spinning".    All other systems reviewed and are negative.                Current Outpatient Medications:     ALPRAZolam (XANAX) 0.5 MG tablet, Take 1 tablet (0.5 mg total) by mouth 2 (two) times daily as needed for Anxiety., Disp: 60 tablet, Rfl: 0    apixaban (ELIQUIS) 5 mg Tab, Take 1 tablet (5 mg total) by mouth 2 (two) times daily., Disp: 180 tablet, Rfl: 1    atorvastatin (LIPITOR) 20 MG tablet, TAKE 1 TABLET EVERY EVENING, Disp: 90 tablet, Rfl: 3    butalbital-aspirin-caffeine -40 mg (FIORINAL) -40 mg Cap, Take 1 capsule by mouth every 6 (six) hours as needed (migraine headache)., Disp: 30 capsule, Rfl: 2    carvediloL (COREG) 3.125 MG tablet, Take 1 tablet (3.125 mg total) by mouth 2 (two) times daily with meals., Disp: 180 tablet, Rfl: 3    cholecalciferol, vitamin D3, 1,250 mcg (50,000 unit) capsule, Take 1 capsule (50,000 Units total) by mouth every 7 days., Disp: 30 capsule, Rfl: 0    cloNIDine (CATAPRES) 0.1 MG tablet, Take " 2 tablets (0.2 mg total) by mouth 2 (two) times daily as needed (blood pressure greater than 170/90)., Disp: 180 tablet, Rfl: 0    dapagliflozin propanediol (FARXIGA) 10 mg tablet, Take 1 tablet (10 mg total) by mouth once daily., Disp: 90 tablet, Rfl: 3    diclofenac sodium (VOLTAREN) 1 % Gel, Apply 2 grams topically once daily., Disp: 400 g, Rfl: 1    hydrALAZINE (APRESOLINE) 100 MG tablet, Take 1 tablet (100 mg total) by mouth 3 (three) times daily., Disp: 270 tablet, Rfl: 3    letrozole (FEMARA) 2.5 mg Tab, TAKE 1 TABLET ONE TIME DAILY, Disp: 90 tablet, Rfl: 3    levocetirizine (XYZAL) 5 MG tablet, TAKE 1 TABLET EVERY EVENING, Disp: 90 tablet, Rfl: 3    LIDOcaine (LIDODERM) 5 %, Place 2 patches onto the skin once daily. Remove & Discard patch within 12 hours or as directed by MD, Disp: 90 patch, Rfl: 1    meclizine (ANTIVERT) 12.5 mg tablet, Take 1 tablet (12.5 mg total) by mouth 3 (three) times daily as needed for Dizziness., Disp: 30 tablet, Rfl: 3    multivitamin (THERAGRAN) per tablet, Take 1 tablet by mouth once daily., Disp: , Rfl:     ranolazine (RANEXA) 1,000 mg Tb12, Take 1 tablet (1,000 mg total) by mouth 2 (two) times daily., Disp: 180 tablet, Rfl: 3    rizatriptan (MAXALT-MLT) 10 MG disintegrating tablet, Take 1 tablet (10 mg total) by mouth as needed for Migraine. May repeat in 2 hours if needed, Disp: 15 tablet, Rfl: 0    tamsulosin (FLOMAX) 0.4 mg Cap, Take 1 capsule (0.4 mg total) by mouth once daily., Disp: 30 capsule, Rfl: 0    tiZANidine (ZANAFLEX) 4 MG tablet, Take 1 tablet (4 mg total) by mouth 2 (two) times daily as needed., Disp: 60 tablet, Rfl: 2    torsemide (DEMADEX) 20 MG Tab, Take 2 tablets (40 mg total) by mouth once daily., Disp: 360 tablet, Rfl: 0    traMADoL (ULTRAM) 50 mg tablet, Take 1 tablet (50 mg total) by mouth every 8 (eight) hours as needed for Pain. Greater than 7 day supply medically necessary., Disp: 90 tablet, Rfl: 2    valsartan (DIOVAN) 80 MG tablet, Take 2 tablets  (160 mg total) by mouth 2 (two) times daily., Disp: 180 tablet, Rfl: 3    venlafaxine (EFFEXOR-XR) 75 MG 24 hr capsule, Take 1 capsule (75 mg total) by mouth once daily., Disp: 30 capsule, Rfl: 0    VENOFER 100 mg iron/5 mL injection, , Disp: , Rfl:     Past Medical History:   Diagnosis Date    Cataract     Bilateral    Chronic diastolic congestive heart failure 2020    Dizziness 2023    Encounter for blood transfusion     History of repair of aneurysm of abdominal aorta using endovascular stent graft     DR Bonds     of psychiatric care     Hypertension     Major depressive disorder, single episode, moderate with anxious distress 2020    Malignant neoplasm of upper-outer quadrant of right breast in female, estrogen receptor positive 2019    radiation    Psychiatric problem     Sleep apnea     Sleep difficulties     Stroke     no residual defect    Therapy        Past Surgical History:   Procedure Laterality Date    APPENDECTOMY      AXILLARY NODE DISSECTION Right 2020    Procedure: LYMPHADENECTOMY, AXILLARY;  Surgeon: Artemio Starks MD;  Location: [facility] OR;  Service: General;  Laterality: Right;    BIOPSY OF AXILLARY NODE Right 2021    Procedure: BIOPSY, LYMPH NODE, AXILLARY;  Surgeon: Artemio Sutton MD;  Location: [address];  Service: General;  Laterality: Right;    BREAST BIOPSY      2019    BREAST LUMPECTOMY Right     2019     SECTION      x 1    OOPHORECTOMY      right     SENTINEL LYMPH NODE BIOPSY Right 2019    Procedure: BIOPSY, LYMPH NODE, SENTINEL;  Surgeon: Artemio Starks MD;  Location: [facility] OR;  Service: General;  Laterality: Right;    splenic artery aneurysm repair      TONSILLECTOMY         Social History     Socioeconomic History    Marital status:      Spouse name: Campos Luther    Number of children: 2   Tobacco Use    Smoking status: Never     Passive exposure: Past    Smokeless tobacco: Never   Substance and Sexual  Activity    Alcohol use: No    Drug use: No    Sexual activity: Yes     Partners: Male     Birth control/protection: Post-menopausal     Social Determinants of Health     Financial Resource Strain: Low Risk  (5/19/2024)    Overall Financial Resource Strain (CARDIA)     Difficulty of Paying Living Expenses: Not hard at all   Food Insecurity: Patient Declined (7/27/2024)    [facility]    Hunger Vital Sign     Worried About Running Out of Food in the Last Year: Patient declined     Ran Out of Food in the Last Year: Patient declined   Transportation Needs: Patient Declined (7/27/2024)    [facility]    PRAPARE - Transportation     Lack of Transportation (Medical): Patient declined     Lack of Transportation (Non-Medical): Patient declined   Physical Activity: Insufficiently Active (5/19/2024)    Exercise Vital Sign     Days of Exercise per Week: 3 days     Minutes of Exercise per Session: 20 min   Stress: No Stress Concern Present (5/19/2024)    [employer] - Occupational Stress Questionnaire     Feeling of Stress : Only a little   Housing Stability: High Risk (3/26/2024)    Housing Stability Vital Sign     Unable to Pay for Housing in the Last Year: No     Number of Places Lived in the Last Year: 1     Unstable Housing in the Last Year: Yes     Past/Current Medical/Surgical History, Past/Current Social History, Past/Current Family History and Past/Current Medications were reviewed in detail.    Objective:     GENERAL APPEARANCE:     The patient looks comfortable.    No signs of respiratory distress.    Normal breathing pattern.    No dysmorphic features    Normal eye contact.     GENERAL MEDICAL EXAM:    HEENT:  Head is atraumatic normocephalic.      Neck and Axillae: No JVD. No visible lesions.    Cardiopulmonary:  No cyanosis. No tachypnea. Normal respiratory effort.    Gastrointestinal/Urogenital:  No jaundice. No stomas or lesions. No visible hernias. No catheters.     Skin, Hair and Nails: No pathognonomic skin rash. No neurofibromatosis. No visible lesions.No stigmata of autoimmune disease. No clubbing.    Limbs: No varicose veins. No visible swelling.    Muskoskeletal: No visible deformities.No visible lesions.      Neurologic Exam     Mental Status   Oriented to person, place, and time.   Oriented to person.   Oriented to place. Oriented to country, city and area.   Oriented to time. Oriented to year, month, date, day and season.   Registration: recalls 3 of 3 objects. Recall at 5 minutes: recalls 3 of 3 objects. Follows 3 step commands.   Attention: normal. Concentration: normal.   Speech: speech is normal   Level of consciousness: alert  Knowledge: good. Able to perform simple calculations.   Able to name object. Able to read. Able to repeat. Able to write. Normal comprehension.     Cranial Nerves     CN II   Visual fields full to confrontation.   Visual acuity: normal  Right visual field deficit: none  Left visual field deficit: none     CN III, IV, VI   Extraocular motions are normal.   Right pupil: Consensual response: intact. Accommodation: intact.   Left pupil: Consensual response: intact. Accommodation: intact.   CN III: no CN III palsy  CN VI: no CN VI palsy  Nystagmus: none   Diplopia: none  Ophthalmoparesis: none  Upgaze: normal  Downgaze: normal  Conjugate gaze: present  Vestibulo-ocular reflex: present    CN V   Facial sensation intact.   Right facial sensation deficit: none  Left facial sensation deficit: none    CN VII   Facial expression full, symmetric.   Right facial weakness: none  Left facial weakness: none    CN VIII   CN VIII normal.   Hearing: intact    CN IX, X   CN IX normal.   CN X normal.   Palate: symmetric    CN XII   CN XII normal.   Tongue: not atrophic  Fasciculations: absent  Tongue  deviation: none    Motor Exam   Muscle bulk: normal  Overall muscle tone: normal  Right arm pronator drift: absent  Left arm pronator drift: absent    Sensory Exam   Graphesthesia: normal  Stereognosis: normal    Gait, Coordination, and Reflexes     Gait  Gait: normal    Tremor   Resting tremor: absent  Intention tremor: absent  Action tremor: absent        Lab Results   Component Value Date    WBC 6.79 07/25/2024    HGB 11.3 (L) 07/25/2024    HCT 36.7 (L) 07/25/2024    MCV 84 07/25/2024     07/25/2024       Sodium   Date Value Ref Range Status   07/25/2024 144 136 - 145 mmol/L Final     Potassium   Date Value Ref Range Status   07/25/2024 4.2 3.5 - 5.1 mmol/L Final     Chloride   Date Value Ref Range Status   07/25/2024 112 (H) 95 - 110 mmol/L Final     CO2   Date Value Ref Range Status   07/25/2024 22 (L) 23 - 29 mmol/L Final     Glucose   Date Value Ref Range Status   07/25/2024 103 70 - 110 mg/dL Final     BUN   Date Value Ref Range Status   07/25/2024 20 8 - 23 mg/dL Final     Creatinine   Date Value Ref Range Status   07/25/2024 1.4 0.5 - 1.4 mg/dL Final     Calcium   Date Value Ref Range Status   07/25/2024 8.9 8.7 - 10.5 mg/dL Final     Total Protein   Date Value Ref Range Status   07/25/2024 6.1 6.0 - 8.4 g/dL Final     Albumin   Date Value Ref Range Status   07/25/2024 2.8 (L) 3.5 - 5.2 g/dL Final     Total Bilirubin   Date Value Ref Range Status   07/25/2024 0.2 0.1 - 1.0 mg/dL Final     Comment:     For infants and newborns, interpretation of results should be based  on gestational age, weight and in agreement with clinical  observations.    Premature Infant recommended reference ranges:  Up to 24 hours.............<8.0 mg/dL  Up to 48 hours............<12.0 mg/dL  3-5 days..................<15.0 mg/dL  6-29 days.................<15.0 mg/dL       Alkaline Phosphatase   Date Value Ref Range Status   07/25/2024 74 55 - 135 U/L Final     AST   Date Value Ref Range Status   07/25/2024 11 10 - 40 U/L  "Final     ALT   Date Value Ref Range Status   07/25/2024 10 10 - 44 U/L Final     Anion Gap   Date Value Ref Range Status   07/25/2024 10 8 - 16 mmol/L Final     eGFR if    Date Value Ref Range Status   07/06/2022 37 (A) >60 mL/min/1.73 m^2 Final     eGFR if non    Date Value Ref Range Status   07/06/2022 32 (A) >60 mL/min/1.73 m^2 Final     Comment:     Calculation used to obtain the estimated glomerular filtration  rate (eGFR) is the CKD-EPI equation.          Lab Results   Component Value Date    EMFEPVRN88 519 06/25/2024       Lab Results   Component Value Date    TSH 1.269 06/25/2024    FREET4 0.92 10/18/2021       No results found in the last 24 hours.    No results found in the last 24 hours.    Reviewed the neuroimaging independently       Assessment:   74 Years old Female with PMH as above came for a follow-up evaluation of "Headaches".   .   Intractable chronic migraine without aura and without status migrainosus     Chronic tension-type headache, intractable     NILS (obstructive sleep apnea)     Class 3 severe obesity with serious comorbidity and body mass index (BMI) of 40.0 to 44.9 in adult, unspecified obesity type     Hypertension, unspecified type     Weakness of both lower extremities     Dizziness     Positive ISAC (antinuclear antibody)     Hot flashes     Fall, initial encounter     Plan:   Patient Neurological Assessment is remarkable for mild bilateral frontal headache during exam. Based on history and physical patient's headaches seem to be tension-type-triggered by stress and HTN along with Migraines. Headaches are still still severe, frequent, debilitating, and interfering with daily activities. Patient's symptoms of dizziness / ringing in ears / nausea / room spinning / blurry vision / double vision occurring outside of headaches point to rule out BPPV / Chronic Vestibular Migraines.       Intractable chronic migraine without aura and without status " migrainosus  / Chronic tension-type headache, intractable     -Continue to follow ophthalmology for routine eye exams and for evaluation of bilateral blurry vision. Patient verbalizes understanding.     -Offered PT for massage therapy / dry needling patient declined.     -Start Qulipta 60 mg PO Daily for Migraine Prevention Therapy. Side effects dicussed. Patient verbalized understanding.     -Continue Maxalt 10 mg PO PRN as prescribed by PCP - patient did not obtain yet due to delay via mail order - is not currently taking, but plans to start therapy.      -Continue Fiorinal -40 mg PO Q6H PRN as prescribed by PCP - Instructed patient to limit use to <3 days per week to prevent re-bound headaches - Fioricet is a short-term treatment. If it is used long term it puts you at risk for re-bound headaches and increases the risk of chronic migraines. Recommended to patient that she use Fiorinal temporarily until Maxalt comes in via mail order. She verbalized understanding.     -Continue Antivert 12.5 mg PO TID PRN - helps with dizziness    -Continue Zanaflex 4 mg PO BID PRN - helps with Neck Tightness associated with Migraines    -Discontinue Ubrelvy 50 mg Q8H PRN -  Patient is not receptive to prescription renewal and would like to try Maxalt first.     -Continue Zofran-ODT 4 mg PO Q8H PRN Nausea. Side effects dicussed. Patient verbalized understanding. No refills needed.    -Reviewed results of ISAC / Vitamin D / Folate / B-12 / TSH with patient - she verbalized understanding      .   NILS (obstructive sleep apnea)     -Non compliance with CPaP - discussed it - Patient is instructed to continue Referral with Sleep Disorders.  She is awaiting delivery of new CPaP Machine.         Class 3 severe obesity    -Patient is instructed on lifestyle modifications for weight management such as heart healthy diet, limiting excess sugar, limiting excess fried and fatty foods, weight management, eating lean meats and vegetables,  staying hydrated, avoiding alcohol and smoking, and exercising at least 30 minutes per day. Patient instructed to continue taking medications as prescribed and to continue to follow PCP for management. Patient verbalizes understanding.      -Continue following with Dietitian for weight management. Patient verbalizes understanding.         Hypertension, unspecified type     -Continue HTN medications as prescribed and follow-ups with PCP and Cardiology for management of other medical conditions, including HTN.         Weakness of both lower extremities     -Patient finished PT for chronic bilateral lower extremity weakness.     -Continue PT for vestibular therapy to rule out BPPV.         Dizziness     -Continue PT for vestibular therapy to rule out BPPV.         Positive ISAC (antinuclear antibody)     -Patient instructed to continue referral to Rheumatology for further work up and evaluation of Positive ISAC Screen. Appointment scheduled for 01/2025        Hot flashes     -Continue Effexor-XR 75 mg Daily - not effective for migraine prevention therapy per patient- patient requesting to stay on Effexor for hot flashes as it is working for that.         Fall, initial encounter     -Discussed orthostatic precautions / falling precautions. Patient verbalized understanding.            LABORATORY EVALUATION    Labs: ([id number]) Lactic Acid / CMP / CBC / Iron and TIBC / Ferritin / Fecal Immunochemical Test / Lipid Panel / BNP / Magnesium / Troponin x 3 / CK / A1C / Vitamin D / TSH / Folate / B-12 /  -personally reviewed -non-significant abnormalities except     GFR (35) / Creatinine (1.57) - decreased kidney function noted- instructed patient to continue following PCP Nephrology for management. She verbalizes understanding.     ISAC Screen - Positive - Patient referred to Rheumatology         RADIOLOGY EVALUATION     MRI Lumbar Spine Without Contrast- done 02/2024- personally reviewed - Multilevel lumbar spondylosis and  degenerative disc disease, most pronounced at L5-S1     Head CT Without Contrast- done 12/2023- personally reviewed- no acute abnormalities noted    Brain MRI With and Without Contrast- done 03/2023- personally reviewed- no acute abnormalities noted    MRA Brain Without Contrast- done 03/2023- personally reviewed- No large vessel occlusion or critical stenosis / cerebral arteries disease distally     MRA Neck Without Contrast- done 03/2023- personally reviewed- non-significant stenosis or occlusion                   MIGRAINE, COMMON, WITHOUT AURA, EPISODIC, HIGH FREQUENCY       MANAGEMENT       HEADACHE DIARY     DISCUSSED THE THREE-FOLD MANAGEMENT OF MIGRAINE:      LIFESTYLE CHANGES:       Good sleep hygiene  Avoid general triggers like lack of sleep/too much sleep, prolonged sun exposure, excessive screen time and specific triggers based on you own diary   Minimize physical and emotional stress  Smoking avoidance and cessation  Limit caffeine drinks to 1-2 a day   Good hydration   Small frequent meals and avoid skipping meals   Moderate 30-minute-long aerobic exercises 3 times/week. Avoid strenuous exercise         ABORTIVE MEDICATIONS (ACUTE-RESCUE MEDICATIONS):     Should only be taken 2-3 times/week to avoid rebound and overuse headaches.    I-explained to the patient that pain meds especially triptans should NOT be taken daily to avoid Rebound Headache and Overuse Headache.    Take at the ONSET of the headache sumatriptan 100 mg PO  (or other Triptan) in combination with naproxen 500 mg PO or Ibuprofen 800 mg PO for headache without nausea or vomiting.  This regimen can be repeated only once in 24 hours after 2 hours.    Side effects of triptans were discussed and include rare cardiac and cerebral ischemia and cannot be used with migraine associate with focal neurological deficits (complicated migraine) in addition to drowsiness and potential impairment of driving ability. The patient verbalized  understanding.    NSAIDs can cause peptic ulcers, renal insufficiency and may increase the risk of cardiovascular diseases.  SEs were discussed with the patient. The patient verbalized understanding.    Triptans have shown to be more effective than Gepatns with more SE/AE.      AVOID NARCOTICS (OPIATES)      1. No randomized controlled study shows pain-free results with opioids in the treatment of migraine.     2. The physiologic consequences of opioid use are adverse, occur quickly, and can be permanent. Decreased gray matter, release of calcitonin gene-related peptide, dynorphin, and pro-inflammatory peptides, and activation of excitatory glutamate receptors are all associated with opioid exposure.     3. Opioids are pro-nociceptive, prevent reversal of migraine central sensitization, and interfere with triptan effectiveness.     4.Opioids precipitate bad clinical outcomes, especially transformation to daily headache.     5. They cause disease progression, comorbidity, and excessive health care consumption.           NEXT OPTIONS:    Triptans: Sumatriptan (Imitrex), Rizatriptan (Maxalt).    Gepants: Nuretc (rimegepant)75 mg >Ubrelvy (ubrogepant) 100 mg    Ditans: Reyvow (lasmiditan) 100 mg (No driving due to sedation)    Fioricet without codeine with Reglan.      Prednisone with Reglan.      LAST RESORT:     DHE NS Trudhesa (Max 2 a week)     C/I: concomitant use of vasoconstrictors like Triptans, strong CY inhibitors such as HAART PIs (eg, ritonavir, nelfinavir, or indinavir) and Macrolides (eg, erythromycin or clarithromycin), CAD, PVD, Stroke/TIA and Uncontrolled HTN.  Serious SEs include Vasospasm and Fibrosis (chronic use).       IMPENDING STATUS: Prednisone and Vistaril.    STATUS MIGRAINOSUS: ED-Infusion for Status Protocol.        PREVENTATIVE (MORE ACCURATELY MIGRAINE REDUCTION) MEDICATIONS:       Since the patient's headache is very frequent a lengthy discussion about preventative medications was  carried out.The patient understands that prevention means DECREASING frequency and severity and NOT elimination.The patient was made aware that any new medication can cause serious allergic reaction.The medication is considered failure only if a therapeutic dose reached and maintained for 6-8 weeks.        HELPFUL SUPPLEMENTS:     Helpful supplements include Co-Q 10, B2, Mg, Feverfew (Dolovent combination) and butterbur (Petadolex)        NEUROPHARMACOLOGY     NEXT OPTIONS:       Zonisamide/Zonegran (ZNS) 100-400 mg QHS is a good alternative to TPM in case of SE/AE.     Amitriptyline/Elavil (TCA) slow titration to 100-Age which can cause sleepiness, dry eyes, dry mouth, urinary retention, and rarely cardiac arrhythmias    Lamotrigine/Lamictal  (LTG)slow titration to 100 mg BID which can cause serious skin rash and rare cardiac arrhythmias. LTG is superior to other therapies for specifically reducing migraine aura.     ANTI-CGRP AGENTS: Qulipta (alogepant) 60 mg QD, Erenumab (Aimovig) 140 mg SQ Pen monthly (Reported cases of Constipation and BP elevation) , Galcanezumab (Emgality) 120 mg SQ Pen monthly after a loading dose of 240 mg  and Fremnezumab (Ajovy) (Ligand Blocker): 225 mg SQ monthly or 675 mg every 3 months     Botox 200 units every 3 months.         LAST RESORT OPTIONS:      Namenda 10 mg BID     Valproic acid/ Depakote         NEUROMODULATION     Cefaly, Relivion, Nerivio and GammaCore (VNS)          SCHOOL AND WORK ACCOMMODATIONS        Allow the patient to wear sunglasses or a cap and switch out fluorescent bulbs.    Allow the patient to arrive 5 minutes later and leave 5 minutes earlier to avoid noisy traffic.    Allow the patient to carry a water bottle and refill as needed.    Allow the patient to snack whenever is needed.    Allow the patient to decrease the computer brightness.    Allow the patient to take breaks as needed and extra time for assignments and deadlines.    Allow the patient to avoid  strenuous activity as needed.              MEDICAL/SURGICAL COMORBIDITIES     All relevant medical comorbidities noted and managed by primary care physician and medical care team.          HEALTHY LIFESTYLE AND PREVENTATIVE CARE    The patient to adhere to the age-appropriate health maintenance guidelines including screening tests and vaccinations. The patient to adhere to  healthy lifestyle, optimal weight, exercise, healthy diet, good sleep hygiene and avoiding drugs including smoking, alcohol and recreational drugs.      I spent a total of  102 minutes on the day of the visit.This includes face to face time and non-face to face time preparing to see the patient (eg, review of tests), obtaining and/or reviewing separately obtained history, documenting clinical information in the electronic or other health record, independently interpreting results and communicating results to the patient/family/caregiver, or care coordinator.       Please do not hesitate to contact me with any updates, questions or concerns.    [id number]    Matthew Reyes, MSN, FNP-C    General Neurology

## 2024-08-15 ENCOUNTER — PATIENT MESSAGE (OUTPATIENT)
Dept: INTERNAL MEDICINE | Facility: CLINIC | Age: 74
End: 2024-08-15
Payer: MEDICARE

## 2024-08-15 DIAGNOSIS — G44.229 CHRONIC TENSION-TYPE HEADACHE, NOT INTRACTABLE: ICD-10-CM

## 2024-08-16 ENCOUNTER — OFFICE VISIT (OUTPATIENT)
Dept: NEUROLOGY | Facility: CLINIC | Age: 74
End: 2024-08-16
Payer: MEDICARE

## 2024-08-16 DIAGNOSIS — E66.01 CLASS 3 SEVERE OBESITY WITH SERIOUS COMORBIDITY AND BODY MASS INDEX (BMI) OF 40.0 TO 44.9 IN ADULT, UNSPECIFIED OBESITY TYPE: ICD-10-CM

## 2024-08-16 DIAGNOSIS — R29.898 WEAKNESS OF BOTH LOWER EXTREMITIES: ICD-10-CM

## 2024-08-16 DIAGNOSIS — R42 DIZZINESS: ICD-10-CM

## 2024-08-16 DIAGNOSIS — G47.33 OSA (OBSTRUCTIVE SLEEP APNEA): ICD-10-CM

## 2024-08-16 DIAGNOSIS — G44.221 CHRONIC TENSION-TYPE HEADACHE, INTRACTABLE: ICD-10-CM

## 2024-08-16 DIAGNOSIS — I10 HYPERTENSION, UNSPECIFIED TYPE: ICD-10-CM

## 2024-08-16 DIAGNOSIS — R76.8 POSITIVE ANA (ANTINUCLEAR ANTIBODY): ICD-10-CM

## 2024-08-16 DIAGNOSIS — G43.719 INTRACTABLE CHRONIC MIGRAINE WITHOUT AURA AND WITHOUT STATUS MIGRAINOSUS: Primary | ICD-10-CM
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
## 2024-08-16 DIAGNOSIS — R23.2 HOT FLASHES: ICD-10-CM

## 2024-08-16 RX ORDER — ATOGEPANT 60 MG/1
60 TABLET ORAL DAILY
Qty: 30 TABLET | Refills: 0 | Status: SHIPPED | OUTPATIENT
Start: 2024-08-16 | End: 2024-09-15

## 2024-08-16 RX ORDER — BUTALBITAL, ASPIRIN, AND CAFFEINE 325; 50; 40 MG/1; MG/1; MG/1
1 CAPSULE ORAL EVERY 6 HOURS PRN
Qty: 90 CAPSULE | Refills: 1 | Status: SHIPPED | OUTPATIENT
Start: 2024-08-16

## 2024-08-17 NOTE — TELEPHONE ENCOUNTER
I sent a refill to [facility] on 8/8/2024. I am unsure what is going on with this prescription.    Rubi

## 2024-08-20 DIAGNOSIS — G44.229 CHRONIC TENSION-TYPE HEADACHE, NOT INTRACTABLE: ICD-10-CM

## 2024-08-20 DIAGNOSIS — R23.2 HOT FLASHES: Primary | ICD-10-CM

## 2024-08-20 RX ORDER — VENLAFAXINE HYDROCHLORIDE 75 MG/1
75 CAPSULE, EXTENDED RELEASE ORAL DAILY
Qty: 30 CAPSULE | Refills: 5 | Status: SHIPPED | OUTPATIENT
Start: 2024-08-20 | End: 2025-02-16

## 2024-08-26 ENCOUNTER — PATIENT MESSAGE (OUTPATIENT)
Dept: NEUROLOGY | Facility: CLINIC | Age: 74
End: 2024-08-26
Payer: MEDICARE

## 2024-08-26 DIAGNOSIS — M79.605 BILATERAL LEG PAIN: ICD-10-CM

## 2024-08-26 DIAGNOSIS — M79.604 BILATERAL LEG PAIN: ICD-10-CM

## 2024-08-26 DIAGNOSIS — G43.719 INTRACTABLE CHRONIC MIGRAINE WITHOUT AURA AND WITHOUT STATUS MIGRAINOSUS: ICD-10-CM

## 2024-08-26 DIAGNOSIS — R23.2 HOT FLASHES: ICD-10-CM

## 2024-08-26 DIAGNOSIS — G44.221 CHRONIC TENSION-TYPE HEADACHE, INTRACTABLE: ICD-10-CM

## 2024-08-26 RX ORDER — VENLAFAXINE HYDROCHLORIDE 75 MG/1
75 CAPSULE, EXTENDED RELEASE ORAL DAILY
Qty: 30 CAPSULE | Refills: 5 | Status: SHIPPED | OUTPATIENT
Start: 2024-08-26 | End: 2025-02-22

## 2024-08-26 RX ORDER — ATOGEPANT 60 MG/1
60 TABLET ORAL DAILY
Qty: 30 TABLET | Refills: 0 | Status: SHIPPED | OUTPATIENT
Start: 2024-08-26 | End: 2024-09-25

## 2024-08-27 ENCOUNTER — PATIENT MESSAGE (OUTPATIENT)
Dept: PULMONOLOGY | Facility: CLINIC | Age: 74
End: 2024-08-27
Payer: MEDICARE

## 2024-08-27 DIAGNOSIS — F54 PSYCHOLOGICAL FACTORS AFFECTING MEDICAL CONDITION: ICD-10-CM

## 2024-08-27 DIAGNOSIS — R79.89 LOW VITAMIN D LEVEL: ICD-10-CM

## 2024-08-27 RX ORDER — LIDOCAINE 50 MG/G
2 PATCH TOPICAL DAILY
Qty: 90 PATCH | Refills: 1 | Status: SHIPPED | OUTPATIENT
Start: 2024-08-27

## 2024-08-27 RX ORDER — ALPRAZOLAM 0.5 MG/1
0.5 TABLET ORAL 2 TIMES DAILY PRN
Qty: 60 TABLET | Refills: 0 | Status: SHIPPED | OUTPATIENT
Start: 2024-08-27 | End: 2024-09-26

## 2024-08-27 RX ORDER — ASPIRIN 325 MG
50000 TABLET, DELAYED RELEASE (ENTERIC COATED) ORAL
Qty: 30 CAPSULE | Refills: 0 | Status: SHIPPED | OUTPATIENT
Start: 2024-08-27

## 2024-08-28 ENCOUNTER — PATIENT MESSAGE (OUTPATIENT)
Dept: ADMINISTRATIVE | Facility: OTHER | Age: 74
End: 2024-08-28
Payer: MEDICARE

## 2024-08-28 ENCOUNTER — PATIENT MESSAGE (OUTPATIENT)
Dept: INTERNAL MEDICINE | Facility: CLINIC | Age: 74
End: 2024-08-28
Payer: MEDICARE

## 2024-09-01 ENCOUNTER — OFFICE VISIT (OUTPATIENT)
Dept: URGENT CARE | Facility: CLINIC | Age: 74
End: 2024-09-01
Payer: MEDICARE

## 2024-09-01 VITALS
HEART RATE: 86 BPM | HEIGHT: 72 IN | SYSTOLIC BLOOD PRESSURE: 122 MMHG | WEIGHT: 293 LBS | OXYGEN SATURATION: 96 % | BODY MASS INDEX: 39.68 KG/M2 | DIASTOLIC BLOOD PRESSURE: 96 MMHG | RESPIRATION RATE: 24 BRPM | TEMPERATURE: 97 F

## 2024-09-01 DIAGNOSIS — R51.9 ACUTE NONINTRACTABLE HEADACHE, UNSPECIFIED HEADACHE TYPE: Primary | ICD-10-CM

## 2024-09-01 DIAGNOSIS — N30.00 ACUTE CYSTITIS WITHOUT HEMATURIA: ICD-10-CM

## 2024-09-01 DIAGNOSIS — I10 PRIMARY HYPERTENSION: ICD-10-CM

## 2024-09-01 DIAGNOSIS — R11.2 NAUSEA AND VOMITING, UNSPECIFIED VOMITING TYPE: ICD-10-CM

## 2024-09-01 DIAGNOSIS — R42 DIZZINESS: ICD-10-CM

## 2024-09-01 LAB
BILIRUBIN, UA POC OHS: ABNORMAL
BLOOD, UA POC OHS: NEGATIVE
CLARITY, UA POC OHS: CLEAR
COLOR, UA POC OHS: YELLOW
GLUCOSE, UA POC OHS: 500
KETONES, UA POC OHS: ABNORMAL
LEUKOCYTES, UA POC OHS: NEGATIVE
NITRITE, UA POC OHS: POSITIVE
PH, UA POC OHS: 6
PROTEIN, UA POC OHS: 30
SPECIFIC GRAVITY, UA POC OHS: >=1.03
UROBILINOGEN, UA POC OHS: 1

## 2024-09-01 PROCEDURE — 87186 SC STD MICRODIL/AGAR DIL: CPT | Mod: HCNC | Performed by: NURSE PRACTITIONER

## 2024-09-01 PROCEDURE — 87086 URINE CULTURE/COLONY COUNT: CPT | Mod: HCNC | Performed by: NURSE PRACTITIONER

## 2024-09-01 PROCEDURE — 93010 ELECTROCARDIOGRAM REPORT: CPT | Mod: HCNC,S$GLB,, | Performed by: INTERNAL MEDICINE

## 2024-09-01 PROCEDURE — 87088 URINE BACTERIA CULTURE: CPT | Mod: HCNC | Performed by: NURSE PRACTITIONER

## 2024-09-01 RX ORDER — ACETAMINOPHEN 160 MG/5ML
650 LIQUID ORAL
Status: COMPLETED | OUTPATIENT
Start: 2024-09-01 | End: 2024-09-01

## 2024-09-01 RX ORDER — PROMETHAZINE HYDROCHLORIDE 25 MG/ML
25 INJECTION, SOLUTION INTRAMUSCULAR; INTRAVENOUS
Status: COMPLETED | OUTPATIENT
Start: 2024-09-01 | End: 2024-09-01

## 2024-09-01 RX ORDER — NITROFURANTOIN 25; 75 MG/1; MG/1
100 CAPSULE ORAL 2 TIMES DAILY
Qty: 14 CAPSULE | Refills: 0 | Status: SHIPPED | OUTPATIENT
Start: 2024-09-01 | End: 2024-09-01

## 2024-09-01 RX ORDER — SULFAMETHOXAZOLE AND TRIMETHOPRIM 800; 160 MG/1; MG/1
1 TABLET ORAL DAILY
Qty: 7 TABLET | Refills: 0 | Status: SHIPPED | OUTPATIENT
Start: 2024-09-01 | End: 2024-09-08

## 2024-09-01 RX ADMIN — PROMETHAZINE HYDROCHLORIDE 25 MG: 25 INJECTION, SOLUTION INTRAMUSCULAR; INTRAVENOUS at 01:09

## 2024-09-01 RX ADMIN — ACETAMINOPHEN 649.6 MG: 160 LIQUID ORAL at 01:09

## 2024-09-01 NOTE — PROGRESS NOTES
"Subjective:      Patient ID: Susu Luther is a 74 y.o. female.    Vitals:  height is 6' 1" (1.854 m) and weight is 137.4 kg (302 lb 14.6 oz) (abnormal). Her tympanic temperature is 97.1 °F (36.2 °C). Her blood pressure is 122/96 (abnormal) and her pulse is 86. Her respiration is 24 (abnormal) and oxygen saturation is 96%.     Chief Complaint: Dizziness    Patient presents with nausea, hypertension, and dizziness. Symptoms started x2 days ago. Patient started with vomiting last night, has not been able to keep down anything. Patient did take nausea medication.     Dizziness:   Chronicity:  New  Onset:  In the past 7 days  Progression since onset:  Gradually worsening  Frequency:  Constantly  Severity:  Severe   Associated symptoms: headaches, nausea, vomiting and light-headedness.      Gastrointestinal:  Positive for nausea and vomiting.   Neurological:  Positive for dizziness, light-headedness and headaches.      Objective:     Vitals:    09/01/24 1156 09/01/24 1311   BP: (!) 171/99 (!) 122/96   BP Location: Right forearm    Patient Position: Sitting    BP Method: Medium (Automatic)    Pulse: 86    Resp: (!) 24    Temp: 97.1 °F (36.2 °C)    TempSrc: Tympanic    SpO2: 96%    Weight: (!) 137.4 kg (302 lb 14.6 oz)    Height: 6' 1" (1.854 m)      Vitals:    09/01/24 1311   BP: (!) 122/96   Pulse:    Resp:    Temp:        Physical Exam   Constitutional: She is oriented to person, place, and time. She appears well-developed.  Non-toxic appearance. She does not appear ill. No distress. obesity  HENT:   Head: Normocephalic and atraumatic.   Ears:   Right Ear: Tympanic membrane, external ear and ear canal normal.   Left Ear: Tympanic membrane, external ear and ear canal normal.   Nose: Nose normal. No nasal deformity. No epistaxis.   Mouth/Throat: Mucous membranes are normal. Mucous membranes are moist. Oropharynx is clear.   Eyes: Conjunctivae and lids are normal. Pupils are equal, round, and reactive to light. " Extraocular movement intact   Neck: Trachea normal and phonation normal. Neck supple.   Cardiovascular: Normal pulses.   Pulmonary/Chest: Effort normal.   Abdominal: Normal appearance and bowel sounds are normal. She exhibits no distension. Soft. There is no abdominal tenderness.   Neurological: She is alert and oriented to person, place, and time.   Skin: Skin is warm, dry, intact and not diaphoretic.   Psychiatric: Her speech is normal and behavior is normal.   Nursing note and vitals reviewed.      Assessment:     1. Acute nonintractable headache, unspecified headache type    2. Acute cystitis without hematuria    3. Nausea and vomiting, unspecified vomiting type    4. Dizziness    5. Primary hypertension       No acute EKG changes compared to EKG 07/2024 72bpm no acute St T wave elevations or depressions; Persistent prolonged QT  Plan:   Patient has recurrent chronic hx of N&V  and dizziness with previous prescription for meclizine; Patient is also under neurology care for migraines     Patient given phenergan 25 mg IM with improved nausea and dizziness; tolerated tylenol 1000mg liquid suspension for headache pain ;   Improved headache prior to d/c   Patient stable for discharge and home management of condition with spouse; ambulated with scooter assist to car.       Acute nonintractable headache, unspecified headache type  -     acetaminophen 160 mg/5 mL solution 649.6 mg    Acute cystitis without hematuria  -     Urine culture  -     Discontinue: nitrofurantoin, macrocrystal-monohydrate, (MACROBID) 100 MG capsule; Take 1 capsule (100 mg total) by mouth 2 (two) times daily. for 7 days  Dispense: 14 capsule; Refill: 0  -     sulfamethoxazole-trimethoprim 800-160mg (BACTRIM DS) 800-160 mg Tab; Take 1 tablet by mouth once daily. for 7 days  Dispense: 7 tablet; Refill: 0    Nausea and vomiting, unspecified vomiting type  -     promethazine injection 25 mg    Dizziness  -     IN OFFICE EKG 12-LEAD (to  [facility])    Primary hypertension  -     IN OFFICE EKG 12-LEAD (to Muse)  -     POCT Urinalysis(Instrument)        Patient Instructions   Increase fluids   Continue with zofran as needed for nausea   REST   Void promptly   Urine culture pending 3-5 days   Wipe front to back   Any fever, chills, nausea/vomiting, > flank pain to ER    Any worst headache, weakness, or concerning symptoms---911 and or local ER evaluation and mgmt            No follow-ups on file.

## 2024-09-01 NOTE — PATIENT INSTRUCTIONS
Increase fluids   Continue with zofran as needed for nausea   REST   Void promptly   Urine culture pending 3-5 days   Wipe front to back   Any fever, chills, nausea/vomiting, > flank pain to ER    Any worst headache, weakness, or concerning symptoms---911 and or local ER evaluation and mgmt

## 2024-09-03 LAB
OHS QRS DURATION: 98 MS
OHS QTC CALCULATION: 479 MS

## 2024-09-04 ENCOUNTER — OFFICE VISIT (OUTPATIENT)
Dept: PAIN MEDICINE | Facility: CLINIC | Age: 74
End: 2024-09-04
Payer: MEDICARE

## 2024-09-04 VITALS
SYSTOLIC BLOOD PRESSURE: 162 MMHG | BODY MASS INDEX: 39.12 KG/M2 | DIASTOLIC BLOOD PRESSURE: 102 MMHG | HEIGHT: 72 IN | RESPIRATION RATE: 17 BRPM | WEIGHT: 288.81 LBS

## 2024-09-04 DIAGNOSIS — C50.411 MALIGNANT NEOPLASM OF UPPER-OUTER QUADRANT OF RIGHT BREAST IN FEMALE, ESTROGEN RECEPTOR POSITIVE: Primary | Chronic | ICD-10-CM

## 2024-09-04 DIAGNOSIS — M79.604 PAIN IN BOTH LOWER EXTREMITIES: ICD-10-CM

## 2024-09-04 DIAGNOSIS — Z17.0 MALIGNANT NEOPLASM OF UPPER-OUTER QUADRANT OF RIGHT BREAST IN FEMALE, ESTROGEN RECEPTOR POSITIVE: Primary | Chronic | ICD-10-CM

## 2024-09-04 DIAGNOSIS — M51.36 DDD (DEGENERATIVE DISC DISEASE), LUMBAR: ICD-10-CM

## 2024-09-04 DIAGNOSIS — I89.0 LYMPHEDEMA: ICD-10-CM

## 2024-09-04 DIAGNOSIS — G89.4 CHRONIC PAIN DISORDER: ICD-10-CM

## 2024-09-04 DIAGNOSIS — Z02.89 PAIN MANAGEMENT CONTRACT AGREEMENT: ICD-10-CM

## 2024-09-04 DIAGNOSIS — M79.605 PAIN IN BOTH LOWER EXTREMITIES: ICD-10-CM

## 2024-09-04 LAB — BACTERIA UR CULT: ABNORMAL

## 2024-09-04 PROCEDURE — 99999 PR PBB SHADOW E&M-EST. PATIENT-LVL III: CPT | Mod: PBBFAC,HCNC,, | Performed by: PHYSICAL MEDICINE & REHABILITATION

## 2024-09-04 RX ORDER — TOPIRAMATE 25 MG/1
25 TABLET ORAL 2 TIMES DAILY
Qty: 60 TABLET | Refills: 11 | Status: SHIPPED | OUTPATIENT
Start: 2024-09-04 | End: 2025-09-04

## 2024-09-04 RX ORDER — TIZANIDINE 4 MG/1
4 TABLET ORAL 2 TIMES DAILY PRN
Qty: 60 TABLET | Refills: 2 | Status: SHIPPED | OUTPATIENT
Start: 2024-09-04 | End: 2024-12-03

## 2024-09-04 RX ORDER — TRAMADOL HYDROCHLORIDE 50 MG/1
50 TABLET ORAL EVERY 8 HOURS PRN
Qty: 90 TABLET | Refills: 2 | Status: SHIPPED | OUTPATIENT
Start: 2024-09-04 | End: 2024-12-03

## 2024-09-04 RX ORDER — GABAPENTIN 300 MG/1
300 CAPSULE ORAL 3 TIMES DAILY
Qty: 90 CAPSULE | Refills: 11 | Status: SHIPPED | OUTPATIENT
Start: 2024-09-04 | End: 2025-09-04

## 2024-09-04 NOTE — PROGRESS NOTES
Established chronic pain patient note (follow-up visit):    Chief Pain Complaint:  Chronic pain and medication refill    Interval History (9/4/2024):    Susu Luther is a 74 y.o. female presents today for follow-up for medication refill for chronic back and leg pain.  She has been stable on a medication regimen consisting of tramadol, tizanidine, and gabapentin.  She was also concerned with her headache medication regimen and she would like to go back on Topamax.  She was very concerned with her right breast mass that continues to enlarge and has had cancer of the same breast previously.      Patient denies night fever/night sweats, urinary incontinence, bowel incontinence, significant weight loss and significant motor weakness.   Patient denies any other complaints or concerns at this time.    Interval HPI 05/29/2024:  Susu Luther is a 74 y.o. female presents today for chronic pain involving her back and lower extremities.  She also continues to have shoulder pain and arm pain mostly on the right side.  She is currently using gabapentin 300 mg t.i.d., tizanidine 4 mg b.i.d. p.r.n., and has previously used tramadol, Norco, and/or Percocet has been short durations to control her pain.  Current pain intensity is 9/10.  She remembers that the tramadol was very effective in managing her pain on a daily basis when she had it available.    Interval HPI 03/01/2024:  Patient Susu Luther presents today for follow-up visit.  Patient is being seen today for review of lumbar MRI and hip x-rays.  She rates her pain today an 8.5/10.  She complains of lower back pain which radiates down the back of the legs down to the feet.  She has had a couple of falls which she feels aggravated her pain even more.  She has been in physical therapy which has helped some.   She also suffers from chronic lymphedema in the bilateral lower extremities and has an appointment in the upcoming couple of weeks to have her legs  wrapped.    Interval History (2/2/2024):  Patient  presents today for follow-up visit.  Patient was last seen on 10/18/2023. Patient reports pain as 9/10 today.  She has a h/o lymphedema, currently in therapy for this. Attending three times a week. Utilizes compression garments on both legs.   Patient reports her BP has been elevated for the past 2 weeks. Has reached out to PCP regarding this.   Patient has pain in her lower back. Associates this to falling in her bathroom in July of last year. States she does not even remember going to the bathroom.  nurse came and called for assistance. She was admitted after this fall for 5 days.     Currently she has pain in her lower back and legs. She has diffuse leg pain  She also reports recent falls- 1 in January and 2 in December. Ambulates with a Rolator.      History of Present Illness 10/18/23:   Susu Luther is a 74 y.o. female  who is presenting with a chief complaint of right axillary and upper extremity Pain. The patient began experiencing this problem insidiously, and the pain has been gradually worsening over the past 3 month(s). The pain is described as throbbing, cramping, burning, aching and heavy and is located in the right axilla . Pain is intermittent and lasts hours. The  pain is nonradiating. The patient rates her pain a 7 out of ten and interferes with activities of daily living a 7 out of ten. Pain is exacerbated by rasing the right arm, lying on the right side, and is improved by rest. Patient reports no prior trauma, no prior spinal surgery  note, patient has a history of breast cancer    - pertinent negatives: No fever, No chills, No weight loss, No bladder dysfunction, No bowel dysfunction, No saddle anesthesia  - pertinent positives: none    - medications, other therapies tried (physical therapy, injections):     >> NSAIDs, Tylenol, Tramadol, Norco, Percocet and gabapentin    >> Has NOT previously undergone Physical Therapy    >> Has NOT  previously undergone spinal injection/s      Imaging / Labs / Studies (reviewed on 9/4/2024):  2/29/2024 Lumbar MRI  FINDINGS:  Alignment: Normal.     Vertebrae: Normal marrow signal. No fracture.     Discs: Severe narrowing of the L5-S1 disc space.  Otherwise normal height and signal.     Cord: Normal.  Conus terminates at L1.     Degenerative findings:     T12-L1: No significant neural foraminal narrowing or spinal canal stenosis.     L1-L2: Mild facet arthropathy and minimal annular bulge contributing to mild inferior left foraminal narrowing.  No significant spinal canal stenosis.     L2-L3: Mild facet arthropathy contributing to mild bilateral inferior foraminal narrowing.  No significant spinal canal stenosis.     L3-L4: Bilateral facet arthropathy and minimal left foraminal annular bulge contributing to mild bilateral inferior foraminal narrowing.  No significant spinal canal stenosis.     L4-L5: Bilateral hypertrophic facet arthropathy resulting in mild bilateral inferior foraminal narrowing.  No significant spinal canal stenosis.     L5-S1: Posterior endplate ridging and annular bulge.  Bilateral facet arthropathy.  Resultant mild to moderate left and mild right inferior foraminal narrowing.  No significant spinal canal stenosis.     Paraspinal muscles & soft tissues: Unremarkable.     Impression:     Multilevel lumbar spondylosis and degenerative disc disease, most pronounced at L5-S1.    2/2/2024 xray hips  FINDINGS:  The bony pelvis is intact. Both femoral heads are well seated within acetabula.  There is severe axial hip joint space narrowing on the right with prominent osteophyte formation identified.  The hip joint space on the left appears to be relatively well preserved.  No plain film evidence to suggest AVN of either femoral head.  Calcified phleboliths seen projecting over the pelvis.    Results for orders placed during the hospital encounter of 05/18/20    X-Ray Cervical Spine Complete 5  "view    Narrative  EXAMINATION:  XR CERVICAL SPINE COMPLETE 5 VIEW    CLINICAL HISTORY:  Exposure to other specified factors, sequela.  Accident, sequela.    FINDINGS:  There is no cervical spine fracture or malalignment.  C5-6 spondylosis with anterior spurring.  Otherwise, unremarkable cervical spine x-ray.    Impression  See above.      Electronically signed by: Naun Jennnigs MD  Date:    05/18/2020  Time:    11:58      Review of Systems:  CONSTITUTIONAL: patient denies any fever, chills, or weight loss  SKIN: patient denies any rash or itching  RESPIRATORY: patient denies having any shortness of breath  GASTROINTESTINAL: patient denies having any diarrhea, constipation, or bowel incontinence  GENITOURINARY: patient denies having any abnormal bladder function    MUSCULOSKELETAL:  - patient complains of the above noted pain/s (see chief pain complaint)    NEUROLOGICAL:   - pain as above  - strength in Upper extremities is intact, BILATERALLY  - sensation in Upper extremities is intact, BILATERALLY  - patient denies any loss of bowel or bladder control      PSYCHIATRIC: patient denies any change in mood    Other:  All other systems reviewed and are negative      Physical Exam:  BP (!) 162/102   Pulse (P) 78   Resp 17   Ht 6' 1" (1.854 m)   Wt 131 kg (288 lb 12.8 oz)   LMP  (LMP Unknown)   BMI 38.10 kg/m²      General: Alert and oriented, in no apparent distress.  Gait: normal gait.  Skin: No rashes, No discoloration, No obvious lesions  HEENT: Normocephalic, atraumatic. Pupils equal and round.  Cardiovascular: Regular rate and rhythm , moderately significant peripheral edema present in the bilateral lower extremities and right upper extremity  Respiratory: Without audible wheezing, without use of accessory muscles of respiration.    Musculoskeletal:  Musculoskeletal - Lumbar Spine:  - Spinal Sensation: Normal   - Pain on flexion of lumbar spine: Present  - Pain on extension of lumbar spine: Present   - TTP " over the lumbar facet joints: Present   - TTP over the lumbar paraspinals: Present   - SLR test equivocal bilaterally    Strength:  UE R/L: D: 5/5; B: 5/5; T: 5/5; WF: 5/5; WE: 5/5; IO: 5/5;  LE R/L: HF: 5/5, HE: 5/5, KF: 5/5; KE: 5/5; FE: 5/5; FF: 5/5    Extremity Reflexes: Brisk and symmetric throughout.      Extremity Sensory: Sensation to pinprick and temperature symmetric. Proprioception intact.      Psych:  Mood and affect is appropriate      Assessment:    Susu uLther is a 74 y.o. year old female who is presenting with     Encounter Diagnoses   Name Primary?    Pain in both lower extremities     Chronic pain disorder     Malignant neoplasm of upper-outer quadrant of right breast in female, estrogen receptor positive Yes             Plan:    1. Interventional:   She was scheduled for bilateral L5-S1 TFESI on 05/22/2024; however, her blood pressure has been unable to be adequately controlled for safety of the procedure    Anticoagulation: Eliquis    Consider R IA hip injection     2. Pharmacologic:   Continue gabapentin 300 mg t.i.d.  Continue Tizanidine 4 mg Po BID PRN. -We have discussed potential deleterious side effects associated with this medication including  dizziness, drowsiness, dry mouth or tingling sensation in the upper or lower extremities.     Restart Topamax 25 mg b.i.d. for migraine management    An opioid pain contract was completed on 05/29/2024 after having an extensive conversation about chronic opioid use for pain management. We discussed the risks and benefits of opioids.  I discouraged the patient from escalation of opioid use and try to minimize its use to decrease chance of dependence, tolerance, and opioid-based hyperalgesia.  I reviewed the  in great detail and there are no inconsistencies and it is appropriate with patient's history.  There are no signs of aberrant drug behavior.  The opioid risk tool was also completed, low risk.  Pt counseled about the side effects of  long term use of opioids including dependence, tolerance, addiction, respiratory depression, somnelence , immune and endocrine dysfunction.  The patient expressed understanding.    Provide tramadol 50 mg Q 8 p.r.n., 90 tablets, with 2 future refills     report:  Reviewed and consistent with medication use as prescribed.        3. Rehabilitative: Continue home exercises and activities as tolerated    4. Diagnostic:  Reviewed imaging available    5. Consult/referral: Breast surgery, external    6. Follow up:  3 months for medication refill      Visit today included increased complexity associated with the care of the episodic problem of chronic pain which was addressed and continue to manage the longitudinal care of the patient due to the serious and/or complex managed problem(s) listed above.      Barry Edwards MD  Interventional Pain Medicine  [facility]

## 2024-09-05 ENCOUNTER — PATIENT MESSAGE (OUTPATIENT)
Dept: CARDIOLOGY | Facility: CLINIC | Age: 74
End: 2024-09-05
Payer: MEDICARE

## 2024-09-05 ENCOUNTER — TELEPHONE (OUTPATIENT)
Dept: CARDIOLOGY | Facility: CLINIC | Age: 74
End: 2024-09-05
Payer: MEDICARE

## 2024-09-09 DIAGNOSIS — F54 PSYCHOLOGICAL FACTORS AFFECTING MEDICAL CONDITION: ICD-10-CM

## 2024-09-09 DIAGNOSIS — M79.604 BILATERAL LEG PAIN: ICD-10-CM

## 2024-09-09 DIAGNOSIS — M79.605 BILATERAL LEG PAIN: ICD-10-CM

## 2024-09-09 DIAGNOSIS — R42 DIZZINESS: ICD-10-CM

## 2024-09-09 DIAGNOSIS — R23.2 HOT FLASHES: ICD-10-CM

## 2024-09-09 RX ORDER — VENLAFAXINE HYDROCHLORIDE 75 MG/1
75 CAPSULE, EXTENDED RELEASE ORAL DAILY
Qty: 30 CAPSULE | Refills: 5 | OUTPATIENT
Start: 2024-09-09 | End: 2025-03-08

## 2024-09-09 NOTE — TELEPHONE ENCOUNTER
Requested Prescriptions     Pending Prescriptions Disp Refills    meclizine (ANTIVERT) 12.5 mg tablet 30 tablet 3     Sig: Take 1 tablet (12.5 mg total) by mouth 3 (three) times daily as needed for Dizziness.    LIDOcaine (LIDODERM) 5 % 90 patch 1     Sig: Place 2 patches onto the skin once daily. Remove & Discard patch within 12 hours or as directed by MD    ALPRAZolam (XANAX) 0.5 MG tablet 60 tablet 0     Sig: Take 1 tablet (0.5 mg total) by mouth 2 (two) times daily as needed for Anxiety.

## 2024-09-09 NOTE — TELEPHONE ENCOUNTER
Lauryn Bingham,     I just refilled this medication to [facility] 2 weeks ago. Can you see if the medication was approved and what the status is on her refills?    Thank you! :)    EUGENE Castro

## 2024-09-10 ENCOUNTER — OFFICE VISIT (OUTPATIENT)
Dept: INTERNAL MEDICINE | Facility: CLINIC | Age: 74
End: 2024-09-10
Payer: MEDICARE

## 2024-09-10 DIAGNOSIS — F54 PSYCHOLOGICAL FACTORS AFFECTING MEDICAL CONDITION: Primary | ICD-10-CM

## 2024-09-10 RX ORDER — ALPRAZOLAM 0.5 MG/1
0.5 TABLET ORAL 2 TIMES DAILY PRN
Qty: 60 TABLET | Refills: 0 | Status: SHIPPED | OUTPATIENT
Start: 2024-09-27 | End: 2024-09-10 | Stop reason: SDUPTHER

## 2024-09-10 RX ORDER — ALPRAZOLAM 0.5 MG/1
0.5 TABLET ORAL 2 TIMES DAILY PRN
Qty: 60 TABLET | Refills: 0 | Status: SHIPPED | OUTPATIENT
Start: 2024-09-27 | End: 2024-10-27

## 2024-09-10 RX ORDER — MECLIZINE HCL 12.5 MG 12.5 MG/1
12.5 TABLET ORAL 3 TIMES DAILY PRN
Qty: 30 TABLET | Refills: 3 | Status: SHIPPED | OUTPATIENT
Start: 2024-09-10

## 2024-09-10 RX ORDER — LIDOCAINE 50 MG/G
2 PATCH TOPICAL DAILY
Qty: 90 PATCH | Refills: 1 | Status: SHIPPED | OUTPATIENT
Start: 2024-09-10

## 2024-09-10 NOTE — PROGRESS NOTES
The patient location is: [place]  The chief complaint leading to consultation is: HTN/Headache    Visit type: audiovisual    Face to Face time with patient: 28 minutes   35 minutes of total time spent on the encounter, which includes face to face time and non-face to face time preparing to see the patient (eg, review of tests), Obtaining and/or reviewing separately obtained history, Documenting clinical information in the electronic or other health record, Independently interpreting results (not separately reported) and communicating results to the patient/family/caregiver, or Care coordination (not separately reported).     Each patient to whom he or she provides medical services by telemedicine is:  (1) informed of the relationship between the physician and patient and the respective role of any other health care provider with respect to management of the patient; and (2) notified that he or she may decline to receive medical services by telemedicine and may withdraw from such care at any time.    Notes:    Subjective:       Patient ID: Susu Luther is a 74 y.o. female.    Chief Complaint: No chief complaint on file.    She had a recent visit to urgent care on 9/1/2024 s/p fall, was dizzy and blood pressure elevated. Diagnosed with E. Coli UTI, currently taking antibiotics   Recently saw Dr. Edwards, recommended referral to Breast specialist     She is under increased stress, , Campos, was informed he has cancer all over his body. He had to have emergency surgery yesterday. Tumors found in his bladder, liver, and colon. She is receiving help from her family members. She has had a decreased appetite and sleep due to interruptions from hospital. Request refill of Xanax to be sent [facility] of [facility]  This is a recurrent problem. The current episode started more than 1 year ago. The problem has been waxing and waning since onset. The problem is resistant. Associated symptoms  include anxiety, blurred vision, chest pain, headaches, malaise/fatigue, orthopnea, palpitations, peripheral edema, PND, shortness of breath and sweats. There are no associated agents to hypertension. Risk factors for coronary artery disease include stress. Past treatments include nothing. The current treatment provides no improvement. There are no compliance problems.      Review of Systems   Constitutional:  Positive for malaise/fatigue.   Eyes:  Positive for blurred vision.   Respiratory:  Positive for shortness of breath.    Cardiovascular:  Positive for chest pain, palpitations, orthopnea and PND.   Neurological:  Positive for headaches.       Objective:      Physical Exam  Constitutional:       General: She is not in acute distress.  HENT:      Head: Normocephalic and atraumatic.   Eyes:      General: No scleral icterus.        Right eye: No discharge.         Left eye: No discharge.   Pulmonary:      Effort: Pulmonary effort is normal.   Neurological:      Mental Status: She is alert and oriented to person, place, and time.   Psychiatric:         Mood and Affect: Mood normal.         Behavior: Behavior normal.         Assessment:       1. Psychological factors affecting medical condition        Plan:   1. Psychological factors affecting medical condition  Chronic, uncontrolled, will discuss taking SNRI consistently for anxiety and for headaches at next appointments, continue use of Xanax as needed given increased stress   -     ALPRAZolam (XANAX) 0.5 MG tablet; Take 1 tablet (0.5 mg total) by mouth 2 (two) times daily as needed for Anxiety.  Dispense: 60 tablet; Refill: 0       Future Appointments   Date Time Provider Department Center   9/16/2024  2:00 PM Alexus Rios NP [facility] CARDIO [facility]   9/17/2024  2:00 PM Reggie Marks MD [facility] PULMSVC [facility]   10/18/2024 12:35 PM LABORATORY, [facility] LAB [facility]   10/22/2024  3:00 PM Jaylyn Kraft FNP [facility] HEM ONC [facility]   11/15/2024   3:30 PM Elier Hernandez MD [facility] RHEUM [place]   12/6/2024  2:20 PM Virginia Vincent PA-C [facility] IN [facility]   1/16/2025 12:30 PM Stephon Adamson MD [facility]       Rubi Littlejohn MD  [facility] [address]  [place] [address]  [phone]

## 2024-09-10 NOTE — TELEPHONE ENCOUNTER
Was unable to make contact [facility]. I called the patient to ask if she received the medication. She stated that she had not, because Ms. Castro refused to give it to her. I advised that Ms. Castro ordered the medications on 08/16. Patient stated that she has not received the medications and that the Qulipta was $99 and cannot pay that. She said she'll have to have a talk with Ms. Castro and hung up on me.

## 2024-09-11 ENCOUNTER — PATIENT MESSAGE (OUTPATIENT)
Dept: INTERNAL MEDICINE | Facility: CLINIC | Age: 74
End: 2024-09-11
Payer: MEDICARE

## 2024-09-13 ENCOUNTER — PATIENT MESSAGE (OUTPATIENT)
Dept: CARDIOLOGY | Facility: CLINIC | Age: 74
End: 2024-09-13
Payer: MEDICARE

## 2024-09-13 DIAGNOSIS — I10 HYPERTENSION, UNSPECIFIED TYPE: ICD-10-CM

## 2024-09-13 DIAGNOSIS — I50.32 CHRONIC DIASTOLIC CONGESTIVE HEART FAILURE: ICD-10-CM

## 2024-09-13 RX ORDER — CARVEDILOL 12.5 MG/1
12.5 TABLET ORAL 2 TIMES DAILY WITH MEALS
Qty: 60 TABLET | Refills: 3 | Status: SHIPPED | OUTPATIENT
Start: 2024-09-13 | End: 2025-09-13

## 2024-09-15 NOTE — TELEPHONE ENCOUNTER
Called and discussed concerns with patient. Unfortunately our call of unexpectedly disconnected. I will make another attempt to call patient back tomorrow.

## 2024-09-19 ENCOUNTER — OFFICE VISIT (OUTPATIENT)
Dept: INTERNAL MEDICINE | Facility: CLINIC | Age: 74
End: 2024-09-19
Payer: MEDICARE

## 2024-09-19 ENCOUNTER — PATIENT MESSAGE (OUTPATIENT)
Dept: INTERNAL MEDICINE | Facility: CLINIC | Age: 74
End: 2024-09-19

## 2024-09-19 VITALS
HEART RATE: 76 BPM | TEMPERATURE: 97 F | RESPIRATION RATE: 18 BRPM | WEIGHT: 293 LBS | HEIGHT: 72 IN | BODY MASS INDEX: 39.68 KG/M2 | DIASTOLIC BLOOD PRESSURE: 90 MMHG | OXYGEN SATURATION: 97 % | SYSTOLIC BLOOD PRESSURE: 140 MMHG

## 2024-09-19 DIAGNOSIS — F41.9 ANXIETY: ICD-10-CM

## 2024-09-19 DIAGNOSIS — N30.00 ACUTE CYSTITIS WITHOUT HEMATURIA: Primary | ICD-10-CM

## 2024-09-19 DIAGNOSIS — Z13.31 POSITIVE DEPRESSION SCREENING: ICD-10-CM

## 2024-09-19 LAB
BILIRUB SERPL-MCNC: NEGATIVE MG/DL
BLOOD URINE, POC: NEGATIVE
CLARITY, POC UA: CLEAR
COLOR, POC UA: YELLOW
GLUCOSE UR QL STRIP: NEGATIVE
KETONES UR QL STRIP: NEGATIVE
LEUKOCYTE ESTERASE URINE, POC: NEGATIVE
NITRITE, POC UA: NEGATIVE
PH, POC UA: 6
PROTEIN, POC: NEGATIVE
SPECIFIC GRAVITY, POC UA: 1.02
UROBILINOGEN, POC UA: 0.2

## 2024-09-19 PROCEDURE — 99999 PR PBB SHADOW E&M-EST. PATIENT-LVL V: CPT | Mod: PBBFAC,HCNC,, | Performed by: FAMILY MEDICINE

## 2024-09-19 PROCEDURE — 87086 URINE CULTURE/COLONY COUNT: CPT | Mod: HCNC | Performed by: FAMILY MEDICINE

## 2024-09-19 RX ORDER — SULFAMETHOXAZOLE AND TRIMETHOPRIM 800; 160 MG/1; MG/1
1 TABLET ORAL 2 TIMES DAILY
Qty: 6 TABLET | Refills: 0 | Status: SHIPPED | OUTPATIENT
Start: 2024-09-19 | End: 2024-09-22

## 2024-09-19 RX ORDER — SULFAMETHOXAZOLE AND TRIMETHOPRIM 800; 160 MG/1; MG/1
1 TABLET ORAL 2 TIMES DAILY
Qty: 6 TABLET | Refills: 0 | Status: SHIPPED | OUTPATIENT
Start: 2024-09-19 | End: 2024-09-19

## 2024-09-19 NOTE — PROGRESS NOTES
Subjective:       Patient ID: Susu Luther is a 74 y.o. female.    Chief Complaint: Urinary Tract Infection, Breast Pain, Headache, and Arm Pain    Abdominal Pain  This is a recurrent problem. The current episode started in the past 7 days. The onset quality is gradual. The problem occurs 2 to 4 times per day. The most recent episode lasted 2 days. The problem has been gradually worsening. The pain is located in the LLQ. The pain is at a severity of 8/10. The pain is severe. The quality of the pain is aching, burning and a sensation of fullness. Associated symptoms include diarrhea, dysuria, frequency, headaches, hematuria and nausea. The pain is aggravated by urination. The pain is relieved by Recumbency and urination. She has tried nothing for the symptoms. The treatment provided no relief. Prior diagnostic workup includes CT scan and ultrasound. Her past medical history is significant for abdominal surgery. There is no history of colon cancer, Crohn's disease, gallstones, GERD, irritable bowel syndrome, pancreatitis, PUD or ulcerative colitis. Patient's medical history includes UTI. Patient's medical history does not include kidney stones.     She is currently undergoing a lot of stress due to her 's sudden decline in his health. He is the patriarch of the family, and she is trying to remain strong for her family especially her children. Her  has stage IV cancer and has undergone multiple procedures thus far. She is very prayerful, but feels she has been neglecting her health in order to available and present for her . She has forgotten to take multiple medications recently. He has home health services and multiple family members have made themselves available to help out in his daily care.       Review of Systems   Gastrointestinal:  Positive for abdominal pain, diarrhea and nausea.   Genitourinary:  Positive for dysuria, frequency and hematuria.   Neurological:  Positive for  headaches.       Objective:      Physical Exam  Vitals reviewed.   Constitutional:       Appearance: Normal appearance.      Comments: Tearful during exam   HENT:      Head: Normocephalic and atraumatic.   Eyes:      General: No scleral icterus.  Cardiovascular:      Rate and Rhythm: Normal rate.   Pulmonary:      Effort: Pulmonary effort is normal. No respiratory distress.   Neurological:      General: No focal deficit present.      Mental Status: She is alert.   Psychiatric:         Mood and Affect: Mood normal.         Behavior: Behavior normal.         Assessment:       1. Acute cystitis without hematuria    2. Positive depression screening    3. Anxiety        Plan:   1. Acute cystitis without hematuria  -     POCT URINE DIPSTICK WITHOUT MICROSCOPE  -     CULTURE, URINE  -     Discontinue: sulfamethoxazole-trimethoprim 800-160mg (BACTRIM DS) 800-160 mg Tab; Take 1 tablet by mouth 2 (two) times daily. for 3 days  Dispense: 6 tablet; Refill: 0  -     sulfamethoxazole-trimethoprim 800-160mg (BACTRIM DS) 800-160 mg Tab; Take 1 tablet by mouth 2 (two) times daily. for 3 days  Dispense: 6 tablet; Refill: 0    2. Positive depression screening  Comments:  I have reviewed the positive depression score which warrants active treatment with psychotherapy and/or medications.  Orders:  -     Ambulatory referral/consult to Psychology; Future; Expected date: 09/26/2024    3. Anxiety  -     Ambulatory referral/consult to Psychology; Future; Expected date: 09/26/2024       RTC in 1 week as scheduled for follow up    I spent a total of 30 minutes on the day of the visit.This includes face to face time and non-face to face time preparing to see the patient (eg, review of tests), obtaining and/or reviewing separately obtained history, documenting clinical information in the electronic or other health record, independently interpreting results and communicating results to the patient/family/caregiver, or care coordinator.   Future  Appointments   Date Time Provider Department [place]   10/3/2024  4:20 PM Rubi Littlejohn MD [facility]   10/4/2024 10:00 AM Blank Hernandez, [facility]   10/18/2024 12:35 PM LABORATORY, [facility]   10/18/2024  1:00 PM Blank Hernandez, [facility]   10/22/2024  3:00 PM Jaylyn Kraft FNP [facility] HEM ONC [facility]   11/15/2024  3:30 PM Elier Hernandez MD [facility] RHE[facility]   12/6/2024  2:20 PM Virginia Vincent PA-C [facility] IN PN  Medical C   1/16/2025 12:30 PM Stephon Adamson MD [facility] RHEU  Medical C       Rubi Littlejohn MD  [facility] [address]  [place] [address]  [phone]

## 2024-09-21 LAB
BACTERIA UR CULT: NORMAL
BACTERIA UR CULT: NORMAL

## 2024-09-23 ENCOUNTER — HOSPITAL ENCOUNTER (EMERGENCY)
Facility: HOSPITAL | Age: 74
Discharge: HOME | End: 2024-09-23
Attending: EMERGENCY MEDICINE
Payer: MEDICARE

## 2024-09-23 VITALS
HEIGHT: 72 IN | SYSTOLIC BLOOD PRESSURE: 176 MMHG | HEART RATE: 76 BPM | OXYGEN SATURATION: 99 % | BODY MASS INDEX: 39.02 KG/M2 | RESPIRATION RATE: 18 BRPM | WEIGHT: 288.13 LBS | DIASTOLIC BLOOD PRESSURE: 96 MMHG | TEMPERATURE: 98 F

## 2024-09-23 DIAGNOSIS — M79.604 RIGHT LEG PAIN: ICD-10-CM

## 2024-09-23 DIAGNOSIS — R55 SYNCOPE: ICD-10-CM

## 2024-09-23 DIAGNOSIS — M25.562 LEFT KNEE PAIN: ICD-10-CM

## 2024-09-23 DIAGNOSIS — M25.551 RIGHT HIP PAIN: ICD-10-CM

## 2024-09-23 LAB
ALBUMIN SERPL BCP-MCNC: 3.3 G/DL (ref 3.5–5.2)
ALP SERPL-CCNC: 94 U/L (ref 55–135)
ALT SERPL W/O P-5'-P-CCNC: 7 U/L (ref 10–44)
ANION GAP SERPL CALC-SCNC: 12 MMOL/L (ref 8–16)
AST SERPL-CCNC: 13 U/L (ref 10–40)
BASOPHILS # BLD AUTO: 0.05 K/UL (ref 0–0.2)
BASOPHILS NFR BLD: 0.4 % (ref 0–1.9)
BILIRUB SERPL-MCNC: 0.3 MG/DL (ref 0.1–1)
BILIRUB UR QL STRIP: NEGATIVE
BUN SERPL-MCNC: 23 MG/DL (ref 8–23)
CALCIUM SERPL-MCNC: 10.3 MG/DL (ref 8.7–10.5)
CHLORIDE SERPL-SCNC: 108 MMOL/L (ref 95–110)
CK SERPL-CCNC: 40 U/L (ref 20–180)
CLARITY UR: CLEAR
CO2 SERPL-SCNC: 22 MMOL/L (ref 23–29)
COLOR UR: YELLOW
CREAT SERPL-MCNC: 1.1 MG/DL (ref 0.5–1.4)
DIFFERENTIAL METHOD BLD: ABNORMAL
EOSINOPHIL # BLD AUTO: 0 K/UL (ref 0–0.5)
EOSINOPHIL NFR BLD: 0.1 % (ref 0–8)
ERYTHROCYTE [DISTWIDTH] IN BLOOD BY AUTOMATED COUNT: 16.2 % (ref 11.5–14.5)
EST. GFR  (NO RACE VARIABLE): 53 ML/MIN/1.73 M^2
GLUCOSE SERPL-MCNC: 109 MG/DL (ref 70–110)
GLUCOSE UR QL STRIP: NEGATIVE
HCT VFR BLD AUTO: 41.4 % (ref 37–48.5)
HGB BLD-MCNC: 13.2 G/DL (ref 12–16)
HGB UR QL STRIP: NEGATIVE
IMM GRANULOCYTES # BLD AUTO: 0.06 K/UL (ref 0–0.04)
IMM GRANULOCYTES NFR BLD AUTO: 0.5 % (ref 0–0.5)
KETONES UR QL STRIP: ABNORMAL
LEUKOCYTE ESTERASE UR QL STRIP: NEGATIVE
LYMPHOCYTES # BLD AUTO: 2.5 K/UL (ref 1–4.8)
LYMPHOCYTES NFR BLD: 21.7 % (ref 18–48)
MCH RBC QN AUTO: 25.7 PG (ref 27–31)
MCHC RBC AUTO-ENTMCNC: 31.9 G/DL (ref 32–36)
MCV RBC AUTO: 81 FL (ref 82–98)
MONOCYTES # BLD AUTO: 0.9 K/UL (ref 0.3–1)
MONOCYTES NFR BLD: 7.6 % (ref 4–15)
NEUTROPHILS # BLD AUTO: 7.9 K/UL (ref 1.8–7.7)
NEUTROPHILS NFR BLD: 69.7 % (ref 38–73)
NITRITE UR QL STRIP: NEGATIVE
NRBC BLD-RTO: 0 /100 WBC
PH UR STRIP: 6 [PH] (ref 5–8)
PLATELET # BLD AUTO: 308 K/UL (ref 150–450)
PMV BLD AUTO: 10.6 FL (ref 9.2–12.9)
POCT GLUCOSE: 99 MG/DL (ref 70–110)
POTASSIUM SERPL-SCNC: 3.8 MMOL/L (ref 3.5–5.1)
PROT SERPL-MCNC: 7.6 G/DL (ref 6–8.4)
PROT UR QL STRIP: ABNORMAL
RBC # BLD AUTO: 5.14 M/UL (ref 4–5.4)
SODIUM SERPL-SCNC: 142 MMOL/L (ref 136–145)
SP GR UR STRIP: 1.02 (ref 1–1.03)
TROPONIN I SERPL DL<=0.01 NG/ML-MCNC: 0.02 NG/ML (ref 0–0.03)
URN SPEC COLLECT METH UR: ABNORMAL
UROBILINOGEN UR STRIP-ACNC: NEGATIVE EU/DL
WBC # BLD AUTO: 11.37 K/UL (ref 3.9–12.7)

## 2024-09-23 PROCEDURE — 85025 COMPLETE CBC W/AUTO DIFF WBC: CPT | Mod: HCNC | Performed by: EMERGENCY MEDICINE

## 2024-09-23 PROCEDURE — 93010 ELECTROCARDIOGRAM REPORT: CPT | Mod: HCNC,,, | Performed by: INTERNAL MEDICINE

## 2024-09-23 PROCEDURE — 96374 THER/PROPH/DIAG INJ IV PUSH: CPT | Mod: HCNC

## 2024-09-23 PROCEDURE — 63600175 PHARM REV CODE 636 W HCPCS: Mod: HCNC | Performed by: EMERGENCY MEDICINE

## 2024-09-23 PROCEDURE — 99285 EMERGENCY DEPT VISIT HI MDM: CPT | Mod: 25,HCNC

## 2024-09-23 PROCEDURE — 81003 URINALYSIS AUTO W/O SCOPE: CPT | Mod: HCNC | Performed by: EMERGENCY MEDICINE

## 2024-09-23 PROCEDURE — 25000003 PHARM REV CODE 250: Mod: HCNC | Performed by: EMERGENCY MEDICINE

## 2024-09-23 PROCEDURE — 93005 ELECTROCARDIOGRAM TRACING: CPT | Mod: HCNC

## 2024-09-23 PROCEDURE — 96375 TX/PRO/DX INJ NEW DRUG ADDON: CPT | Mod: HCNC

## 2024-09-23 PROCEDURE — 80053 COMPREHEN METABOLIC PANEL: CPT | Mod: HCNC | Performed by: EMERGENCY MEDICINE

## 2024-09-23 PROCEDURE — 82962 GLUCOSE BLOOD TEST: CPT | Mod: HCNC

## 2024-09-23 PROCEDURE — 82550 ASSAY OF CK (CPK): CPT | Mod: HCNC | Performed by: EMERGENCY MEDICINE

## 2024-09-23 PROCEDURE — 84484 ASSAY OF TROPONIN QUANT: CPT | Mod: HCNC | Performed by: EMERGENCY MEDICINE

## 2024-09-23 RX ORDER — ONDANSETRON 4 MG/1
4 TABLET, ORALLY DISINTEGRATING ORAL EVERY 8 HOURS PRN
Qty: 15 TABLET | Refills: 0 | Status: SHIPPED | OUTPATIENT
Start: 2024-09-23

## 2024-09-23 RX ORDER — CLONIDINE HYDROCHLORIDE 0.1 MG/1
0.1 TABLET ORAL
Status: COMPLETED | OUTPATIENT
Start: 2024-09-23 | End: 2024-09-23

## 2024-09-23 RX ORDER — HYDRALAZINE HYDROCHLORIDE 20 MG/ML
10 INJECTION INTRAMUSCULAR; INTRAVENOUS
Status: COMPLETED | OUTPATIENT
Start: 2024-09-23 | End: 2024-09-23

## 2024-09-23 RX ORDER — ONDANSETRON HYDROCHLORIDE 2 MG/ML
4 INJECTION, SOLUTION INTRAVENOUS
Status: COMPLETED | OUTPATIENT
Start: 2024-09-23 | End: 2024-09-23

## 2024-09-23 RX ADMIN — HYDRALAZINE HYDROCHLORIDE 10 MG: 20 INJECTION, SOLUTION INTRAMUSCULAR; INTRAVENOUS at 04:09

## 2024-09-23 RX ADMIN — ONDANSETRON 4 MG: 2 INJECTION INTRAMUSCULAR; INTRAVENOUS at 04:09

## 2024-09-23 RX ADMIN — CLONIDINE HYDROCHLORIDE 0.1 MG: 0.1 TABLET ORAL at 04:09

## 2024-09-23 NOTE — ED PROVIDER NOTES
SCRIBE #1 NOTE: I, Ant Littlejohn, am scribing for, and in the presence of, Mita Sun MD. I have scribed the HPI, ROS, and PE.     SCRIBE #2 NOTE: I, Dio Magallanes, am scribing for, and in the presence of,  Adeel Frederick DO. I have scribed the remaining portions of the note not scribed by Scribe #1.      History     Chief Complaint   Patient presents with    Loss of Consciousness     Pt brought in by EMS for syncopal episode, complaining of L sided neck pain; also complaining of n/v/d x 3 days     Review of patient's allergies indicates:   Allergen Reactions    Pcn [penicillins] Hives         History of Present Illness     HPI    9/23/2024, 3:42 PM  History obtained from the patient      History of Present Illness: Susu Luther is a 74 y.o. female patient with a PMHx of HTN, stroke, anxiety, depression, and chronic diastolic heart failure who presents to the Emergency Department for evaluation of syncopal episode which onset suddenly today while going to see her PCP, Dr. Littlejohn. Pt fell on concrete and hit her head on concrete. Pt has dark brown and watery stool. Symptoms are constant and moderate in severity. No mitigating or exacerbating factors reported. Associated sxs include N/V/D, dehydration, RUQ and RLQ abd pain, left knee pain, right hip pain, dizziness, weakness, and right hand pain. Patient denies any CP, SOB, fever, dysuria, and all other sxs at this time. Pt takes Eliquis and BP medications but has not tolerated her medications since Thursday due to emesis. Pt takes chemotherapy pills by mouth. No further complaints or concerns at this time.       Arrival mode: EMS    PCP: Vannesa, Primary Doctor        Past Medical History:  Past Medical History:   Diagnosis Date    Cataract     Bilateral    Chronic diastolic congestive heart failure 08/25/2020    Dizziness 03/12/2023    Encounter for blood transfusion     History of repair of aneurysm of abdominal aorta using endovascular stent graft      DR Bonds    Hx of psychiatric care     Hypertension     Major depressive disorder, single episode, moderate with anxious distress 2020    Malignant neoplasm of upper-outer quadrant of right breast in female, estrogen receptor positive 2019    radiation    Psychiatric problem     Sleep apnea     Sleep difficulties     Stroke 2009    no residual defect    Therapy        Past Surgical History:  Past Surgical History:   Procedure Laterality Date    APPENDECTOMY      AXILLARY NODE DISSECTION Right 2020    Procedure: LYMPHADENECTOMY, AXILLARY;  Surgeon: Artemio Starks MD;  Location: [facility] OR;  Service: General;  Laterality: Right;    BIOPSY OF AXILLARY NODE Right 2021    Procedure: BIOPSY, LYMPH NODE, AXILLARY;  Surgeon: Artemio Sutton MD;  Location: [address];  Service: General;  Laterality: Right;    BREAST BIOPSY      2019    BREAST LUMPECTOMY Right     2019     SECTION      x 1    OOPHORECTOMY      right     SENTINEL LYMPH NODE BIOPSY Right 2019    Procedure: BIOPSY, LYMPH NODE, SENTINEL;  Surgeon: Artemio Starks MD;  Location: [facility];  Service: General;  Laterality: Right;    splenic artery aneurysm repair      TONSILLECTOMY           Family History:  Family History   Problem Relation Name Age of Onset    Diabetes Maternal Grandmother Susu Jennings     Diabetes Maternal Grandfather N/A     Diabetes Mother Balbina Tena     Hypertension Mother Balbina Tena     Sickle cell anemia Daughter      Breast cancer Neg Hx      Colon cancer Neg Hx      Ovarian cancer Neg Hx         Social History:  Social History     Tobacco Use    Smoking status: Never     Passive exposure: Past    Smokeless tobacco: Never   Substance and Sexual Activity    Alcohol use: No    Drug use: No    Sexual activity: Yes     Partners: Male     Birth control/protection: Post-menopausal        Review of Systems     Review of Systems   Constitutional:  Negative for chills and fever.        (+)  dehydrated   HENT:  Negative for congestion and sore throat.         (+) left forehead pain   Respiratory:  Negative for cough and shortness of breath.    Cardiovascular:  Negative for chest pain.   Gastrointestinal:  Positive for abdominal pain (RUQ and RLQ), diarrhea, nausea and vomiting.   Genitourinary:  Negative for dysuria.   Musculoskeletal:  Positive for arthralgias (left knee, right hip). Negative for back pain.        (+) right hand pain   Skin:  Negative for rash.   Neurological:  Positive for dizziness, syncope and weakness.   Hematological:  Does not bruise/bleed easily.   All other systems reviewed and are negative.       Physical Exam     Initial Vitals [09/23/24 1404]   BP Pulse Resp Temp SpO2   (!) 140/97 72 16 98.1 °F (36.7 °C) 98 %      MAP       --          Physical Exam  Nursing Notes and Vital Signs Reviewed.  Constitutional: Patient is in no acute distress. Well-developed and well-nourished.  Head: Atraumatic. Normocephalic. No forehead bruising or contusions. No laceration or abrasion.  Eyes: PERRL. EOM intact. Conjunctivae are not pale. No scleral icterus.  ENT: Mucous membranes are moist. Oropharynx is clear and symmetric.    Neck: Supple. Full ROM. No lymphadenopathy. No midline cervical tenderness.  Cardiovascular: Regular rate. Regular rhythm. No murmurs, rubs, or gallops. Distal pulses are 2+ and symmetric.  Pulmonary/Chest: No respiratory distress. Clear to auscultation bilaterally. No wheezing or rales.  Abdominal: Abd is soft, obese, and non-distended.  There is RUQ and RLQ tenderness.  No rebound, guarding, or rigidity. Good bowel sounds.  Genitourinary: No CVA tenderness  Musculoskeletal: Moves all extremities. No obvious deformities. No edema. No calf tenderness. Pelvis is stable. No obvious trauma to arms or chest wall. No bruising, contusions, or abrasions to left knee. Abrasion to right hand but no obvious deformities.  Skin: Warm and dry.  Neurological:  Alert, awake, and  "appropriate.  Normal speech.  No acute focal neurological deficits are appreciated.  Psychiatric: Normal affect. Good eye contact. Appropriate in content.     ED Course   Procedures  ED Vital Signs:  Vitals:    09/23/24 1404 09/23/24 1545 09/23/24 1546 09/23/24 1550   BP: (!) 140/97  (!) 211/116 (!) 197/107   Pulse: 72  74 91   Resp: 16  18 18   Temp: 98.1 °F (36.7 °C)  98.1 °F (36.7 °C) 98.1 °F (36.7 °C)   TempSrc:   Oral    SpO2: 98%  100% 100%   Weight:  130.7 kg (288 lb 2.3 oz)     Height:        09/23/24 1600 09/23/24 1708 09/23/24 1830 09/23/24 2000   BP: (!) 194/96 (!) 185/101 (!) 157/88 (!) 154/82   Pulse: 73 83 90 92   Resp: (!) 23 14 18 18   Temp:   98.1 °F (36.7 °C) 98.1 °F (36.7 °C)   TempSrc:    Oral   SpO2: 100% 100% 98% 98%   Weight:    130.7 kg (288 lb 2.3 oz)   Height:    6' 1" (1.854 m)    09/23/24 2130   BP: (!) 176/96   Pulse: 76   Resp: 18   Temp: 98.2 °F (36.8 °C)   TempSrc: Oral   SpO2: 99%   Weight:    Height:        Abnormal Lab Results:  Labs Reviewed   CBC W/ AUTO DIFFERENTIAL - Abnormal       Result Value    WBC 11.37      RBC 5.14      Hemoglobin 13.2      Hematocrit 41.4      MCV 81 (*)     MCH 25.7 (*)     MCHC 31.9 (*)     RDW 16.2 (*)     Platelets 308      MPV 10.6      Immature Granulocytes 0.5      Gran # (ANC) 7.9 (*)     Immature Grans (Abs) 0.06 (*)     Lymph # 2.5      Mono # 0.9      Eos # 0.0      Baso # 0.05      nRBC 0      Gran % 69.7      Lymph % 21.7      Mono % 7.6      Eosinophil % 0.1      Basophil % 0.4      Differential Method Automated     URINALYSIS, REFLEX TO URINE CULTURE - Abnormal    Specimen UA Urine, Catheterized      Color, UA Yellow      Appearance, UA Clear      pH, UA 6.0      Specific Gravity, UA 1.025      Protein, UA Trace (*)     Glucose, UA Negative      Ketones, UA 1+ (*)     Bilirubin (UA) Negative      Occult Blood UA Negative      Nitrite, UA Negative      Urobilinogen, UA Negative      Leukocytes, UA Negative      Narrative:     Specimen " Source->Urine   COMPREHENSIVE METABOLIC PANEL - Abnormal    Sodium 142      Potassium 3.8      Chloride 108      CO2 22 (*)     Glucose 109      BUN 23      Creatinine 1.1      Calcium 10.3      Total Protein 7.6      Albumin 3.3 (*)     Total Bilirubin 0.3      Alkaline Phosphatase 94      AST 13      ALT 7 (*)     eGFR 53 (*)     Anion Gap 12     TROPONIN I   CK   CK    CPK 40     TROPONIN I    Troponin I 0.018     POCT GLUCOSE    POCT Glucose 99          All Lab Results:  Results for orders placed or performed during the hospital encounter of 09/23/24   CBC W/ AUTO DIFFERENTIAL   Result Value Ref Range    WBC 11.37 3.90 - 12.70 K/uL    RBC 5.14 4.00 - 5.40 M/uL    Hemoglobin 13.2 12.0 - 16.0 g/dL    Hematocrit 41.4 37.0 - 48.5 %    MCV 81 (L) 82 - 98 fL    MCH 25.7 (L) 27.0 - 31.0 pg    MCHC 31.9 (L) 32.0 - 36.0 g/dL    RDW 16.2 (H) 11.5 - 14.5 %    Platelets 308 150 - 450 K/uL    MPV 10.6 9.2 - 12.9 fL    Immature Granulocytes 0.5 0.0 - 0.5 %    Gran # (ANC) 7.9 (H) 1.8 - 7.7 K/uL    Immature Grans (Abs) 0.06 (H) 0.00 - 0.04 K/uL    Lymph # 2.5 1.0 - 4.8 K/uL    Mono # 0.9 0.3 - 1.0 K/uL    Eos # 0.0 0.0 - 0.5 K/uL    Baso # 0.05 0.00 - 0.20 K/uL    nRBC 0 0 /100 WBC    Gran % 69.7 38.0 - 73.0 %    Lymph % 21.7 18.0 - 48.0 %    Mono % 7.6 4.0 - 15.0 %    Eosinophil % 0.1 0.0 - 8.0 %    Basophil % 0.4 0.0 - 1.9 %    Differential Method Automated    Urinalysis, Reflex to Urine Culture Urine, Catheterized    Specimen: Urine   Result Value Ref Range    Specimen UA Urine, Catheterized     Color, UA Yellow Yellow, Straw, Patricia    Appearance, UA Clear Clear    pH, UA 6.0 5.0 - 8.0    Specific Gravity, UA 1.025 1.005 - 1.030    Protein, UA Trace (A) Negative    Glucose, UA Negative Negative    Ketones, UA 1+ (A) Negative    Bilirubin (UA) Negative Negative    Occult Blood UA Negative Negative    Nitrite, UA Negative Negative    Urobilinogen, UA Negative <2.0 EU/dL    Leukocytes, UA Negative Negative   Comprehensive  metabolic panel   Result Value Ref Range    Sodium 142 136 - 145 mmol/L    Potassium 3.8 3.5 - 5.1 mmol/L    Chloride 108 95 - 110 mmol/L    CO2 22 (L) 23 - 29 mmol/L    Glucose 109 70 - 110 mg/dL    BUN 23 8 - 23 mg/dL    Creatinine 1.1 0.5 - 1.4 mg/dL    Calcium 10.3 8.7 - 10.5 mg/dL    Total Protein 7.6 6.0 - 8.4 g/dL    Albumin 3.3 (L) 3.5 - 5.2 g/dL    Total Bilirubin 0.3 0.1 - 1.0 mg/dL    Alkaline Phosphatase 94 55 - 135 U/L    AST 13 10 - 40 U/L    ALT 7 (L) 10 - 44 U/L    eGFR 53 (A) >60 mL/min/1.73 m^2    Anion Gap 12 8 - 16 mmol/L   CK   Result Value Ref Range    CPK 40 20 - 180 U/L   Troponin I   Result Value Ref Range    Troponin I 0.018 0.000 - 0.026 ng/mL   POCT glucose   Result Value Ref Range    POCT Glucose 99 70 - 110 mg/dL     *Note: Due to a large number of results and/or encounters for the requested time period, some results have not been displayed. A complete set of results can be found in Results Review.         Imaging Results:  Imaging Results              X-Ray Knee Complete 4 or More Views Left (Final result)  Result time 09/23/24 17:23:21      Final result by Edouard Gomez MD (09/23/24 17:23:21)                   Impression:      As above.      Electronically signed by: Edouard Gomez  Date:    09/23/2024  Time:    17:23               Narrative:    EXAMINATION:  XR KNEE COMP 4 OR MORE VIEWS LEFT    CLINICAL HISTORY:  Pain in left knee    TECHNIQUE:  AP, Lateral, Sunrise and Tunnel views of the left knee were preformed    COMPARISON:  None    FINDINGS:  At least moderate degenerative change.  No significant joint effusion.  No fracture or traumatic malalignment definitely identified.  If high clinical concern remains consider additional evaluation.                                       X-Ray Hip 2 or 3 views Right with Pelvis when performed (Final result)  Result time 09/23/24 17:27:43      Final result by David Guevara MD (09/23/24 17:27:43)                   Impression:      No  acute abnormality.  Moderate degenerative joint disease of the right hip      Electronically signed by: David Guevara  Date:    09/23/2024  Time:    17:27               Narrative:    EXAMINATION:  XR HIP WITH PELVIS WHEN PERFORMED 2 OR 3 VIEWS RIGHT    CLINICAL HISTORY:  XR HIP WITH PELVIS WHEN PERFORMED 2 OR 3 VIEWS RIGHTPain in right hip    COMPARISON:  None    FINDINGS:  Multiple radiographic views  were obtained.    No evidence of acute fracture or dislocation.  Moderate degenerative joint disease of the right hip.                                       X-Ray Femur Ap/Lat Right (Final result)  Result time 09/23/24 17:15:35      Final result by David Guevara MD (09/23/24 17:15:35)                   Impression:      As above      Electronically signed by: David Guevara  Date:    09/23/2024  Time:    17:15               Narrative:    EXAMINATION:  XR FEMUR 2 VIEW RIGHT    CLINICAL HISTORY:  XR FEMUR 2 VIEW RIGHTPain in right leg    COMPARISON:  None    FINDINGS:  Multiple radiographic views  were obtained.    Degenerative joint disease of the hip.  Degenerative joint disease of the knee.  No acute fracture or dislocation.                                       X-Ray Abdomen Flat And Erect (Final result)  Result time 09/23/24 17:28:22      Final result by Edouard Gomez MD (09/23/24 17:28:22)                   Impression:      Nonobstructive small bowel gas pattern.      Electronically signed by: Edouard Gomez  Date:    09/23/2024  Time:    17:28               Narrative:    EXAMINATION:  XR ABDOMEN FLAT AND ERECT    CLINICAL HISTORY:  Abdominal Pain;    TECHNIQUE:  Flat and erect AP views of the abdomen were performed.    COMPARISON:  None    FINDINGS:  No air-fluid levels on upright radiograph.  No dilated loops of small bowel on supine radiograph.    Stool burden within the colon is within normal limits.    No calculi overlie the renal shadows.    Osseous structures are grossly intact.  Lung bases are clear.                                        CT Head Without Contrast (Final result)  Result time 09/23/24 16:55:12      Final result by Edouard Gomez MD (09/23/24 16:55:12)                   Impression:      No acute intracranial CT abnormality.  Correlation and further evaluation as warranted.    All CT scans at this facility are performed  using dose modulation techniques as appropriate to performed exam including the following:  automated exposure control; adjustment of mA and/or kV according to the patients size (this includes techniques or standardized protocols for targeted exams where dose is matched to indication/reason for exam: i.e. extremities or head);  iterative reconstruction technique.      Electronically signed by: Edouard Gomez  Date:    09/23/2024  Time:    16:55               Narrative:    EXAMINATION:  CT HEAD WITHOUT CONTRAST    CLINICAL HISTORY:  Head trauma, minor (Age >= 65y);    TECHNIQUE:  Low dose axial CT images obtained throughout the head without intravenous contrast. Sagittal and coronal reconstructions were performed.    COMPARISON:  None.    FINDINGS:  Intracranial compartment:    Ventricles and sulci are normal in size for age without evidence of hydrocephalus. No extra-axial blood or fluid collections.    The brain parenchyma appears normal. No parenchymal mass, hemorrhage, edema or major vascular distribution infarct.    Skull/extracranial contents (limited evaluation): No fracture. Mastoid air cells and paranasal sinuses are essentially clear.                                       CT Cervical Spine Without Contrast (Final result)  Result time 09/23/24 16:51:45      Final result by David Guevara MD (09/23/24 16:51:45)                   Impression:      No acute fracture or dislocation.    Mild moderate multilevel degenerative disc disease.    All CT scans   are performed using dose optimization techniques including the following: automated exposure control; adjustment of the mA  and/or kV; use of iterative reconstruction technique.  Dose modulation was employed for ALARA by means of: Automated exposure control; adjustment of the mA and/or kV according to patient size (this includes techniques or standardized protocols for targeted exams where dose is matched to indication/reason for exam; i.e. extremities or head); and/or use of iterative reconstructive technique.      Electronically signed by: David Guevara  Date:    09/23/2024  Time:    16:51               Narrative:    EXAMINATION:  CT CERVICAL SPINE WITHOUT CONTRAST    CLINICAL HISTORY:  Neck trauma (Age >= 65y);    TECHNIQUE:  Low dose axial images, sagittal and coronal reformations were performed though the cervical spine.  Contrast was not administered.    COMPARISON:  None    FINDINGS:  Normal vertebral body heights without evidence for spondylolisthesis.  At least mild moderate multilevel degenerative disc disease.  No prevertebral soft tissue swelling.  Facet joints are congruent.  Normal bone mineral density.                                       The EKG was ordered, reviewed, and independently interpreted by the ED provider.  Interpretation time: 15:43  Rate: 73 BPM  Rhythm: normal sinus rhythm with sinus arrhythmia  Interpretation: Left axis deviation. Incomplete RBBB. Left ventricular hypertrophy (R in a VL, Néstor product, Romhilt-Doyle). No STEMI.             The Emergency Provider reviewed the vital signs and test results, which are outlined above.     ED Discussion     4:00 PM: Dr. Sun transfers care of patient to Dr. Frederick pending lab results.    9:15 PM: Reassessed pt at this time. Discussed with pt all pertinent ED information and results. Discussed pt dx and plan of tx. Gave pt all f/u and return to the ED instructions. All questions and concerns were addressed at this time. Pt expresses understanding of information and instructions, and is comfortable with plan to discharge. Pt is stable for discharge.    I  discussed with patient and/or family/caretaker that evaluation in the ED does not suggest any emergent or life threatening medical conditions requiring immediate intervention beyond what was provided in the ED, and I believe patient is safe for discharge.  Regardless, an unremarkable evaluation in the ED does not preclude the development or presence of a serious of life threatening condition. As such, patient was instructed to return immediately for any worsening or change in current symptoms.      ED Course as of 09/24/24 0227   Mon Sep 23, 2024   2009 CBC W/ AUTO DIFFERENTIAL(!)  Nonspecific findings [CD]   2009 Comprehensive metabolic panel(!)  Nonspecific finding [CD]   2009 Urinalysis, Reflex to Urine Culture Urine, Catheterized(!)  No UTI [CD]   2010 X-Ray Abdomen Flat And Erect  Nonobstructive bowel gas pattern [CD]   2010 X-Ray Hip 2 or 3 views Right with Pelvis when performed  No acute findings [CD]   2010 X-Ray Knee Complete 4 or More Views Left  No acute findings [CD]   2010 X-Ray Femur Ap/Lat Right  No acute finding [CD]   2010 CT Head Without Contrast  No acute findings [CD]   2010 CT Cervical Spine Without Contrast  No acute finding [CD]   2110 Troponin I  The normal limit [CD]   2110 CK  Thin normal limits [CD]      ED Course User Index  [CD] Adeel Frederick, DO     Medical Decision Making  At the time of final re-evaluation the patient wants to go home.  She is able to tolerate fluids and ambulate around the department with no difficulty and no assistance at her baseline.  She has no dizziness, no chest pain, no shortness of breath.  She is instructed to return immediately for any new or worsening symptoms and she verbalized understanding.    Amount and/or Complexity of Data Reviewed  External Data Reviewed: notes.     Details: Pt was seen by Dr. Littlejohn, PCP, for LLQ abd pain, N/D and dysuria on September 1st. Urine dipstick completed and samples collected. Pt was positive for glucose and  nitrates. Pt was prescribed Bactrim.     September 19th repeat dipstick and UA were negative. Pt was prescribed more Bactrim even though the tests were negative.  Labs: ordered. Decision-making details documented in ED Course.     Details: CPK and troponin are within normal limits  Radiology: ordered. Decision-making details documented in ED Course.  ECG/medicine tests: ordered and independent interpretation performed. Decision-making details documented in ED Course.    Risk  Prescription drug management.  Risk Details: Differential diagnosis includes but is not limited to:  ACS, heart failure, electrolyte abnormality, fracture, dislocation, contusion, sprain, strain, noncompliance with medications                ED Medication(s):  Medications   ondansetron injection 4 mg (4 mg Intravenous Given 9/23/24 1611)   hydrALAZINE injection 10 mg (10 mg Intravenous Given 9/23/24 1610)   cloNIDine tablet 0.1 mg (0.1 mg Oral Given 9/23/24 1611)       Discharge Medication List as of 9/23/2024  9:21 PM        START taking these medications    Details   ondansetron (ZOFRAN-ODT) 4 MG TbDL Take 1 tablet (4 mg total) by mouth every 8 (eight) hours as needed (nausea/vomiting)., Starting Mon 9/23/2024, Print              Follow-up Information       Schedule an appointment as soon as possible for a visit  with Rubi Littlejohn MD.    Specialty: Family Medicine  Contact information:  [address] [facility] [id number] [phone]                                 Scribe Attestation:   Scribe #1: I performed the above scribed service and the documentation accurately describes the services I performed. I attest to the accuracy of the note.     Attending:   Physician Attestation Statement for Scribe #1: I, Mita Sun MD, personally performed the services described in this documentation, as scribed by Ant Littlejohn, in my presence, and it is both accurate and complete.       Scribe Attestation:   Scribe #2: I performed the  above scribed service and the documentation accurately describes the services I performed. I attest to the accuracy of the note.    Attending Attestation:           Physician Attestation for Scribe:    Physician Attestation Statement for Scribe #2: I, Adeel Frederick DO, reviewed documentation, as scribed by Dio Magallanes in my presence, and it is both accurate and complete. I also acknowledge and confirm the content of the note done by Scribe #1.           Clinical Impression       ICD-10-CM ICD-9-CM   1. Syncope  R55 780.2   2. Right hip pain  M25.551 719.45   3. Right leg pain  M79.604 729.5   4. Left knee pain  M25.562 719.46       Disposition:   Disposition: Discharged  Condition: Stable        Adeel Frederick DO  09/24/24 0229

## 2024-09-24 ENCOUNTER — PATIENT OUTREACH (OUTPATIENT)
Dept: EMERGENCY MEDICINE | Facility: HOSPITAL | Age: 74
End: 2024-09-24
Payer: MEDICARE

## 2024-09-24 LAB
OHS QRS DURATION: 98 MS
OHS QTC CALCULATION: 456 MS

## 2024-09-24 NOTE — PROGRESS NOTES
Elana Manning  ED Navigator  Emergency Department    Project: [facility] ED Navigator  Role: Community Health Worker    Date: 09/24/2024  Patient Name: Susu Luther  MRN: [id number]  PCP: Rubi Littlejohn MD    Assessment:     Susu Luther is a 74 y.o. female who has presented to ED for Syncope; Right hip pain; Right leg pain; Left knee pain. Patient has visited the ED 1 times in the past 3 months. Patient did not contact PCP.     ED Navigator Initial Assessment    ED Navigator Enrollment Documentation  Consent to Services  Does patient consent to completing the assessment?: Yes  Contact  Method of Initial Contact: Phone  Transportation  Does the patient have issues with Transportation?: No  Does the patient have transportation to and from healthcare appointments?: Yes  Insurance Coverage  Do you have coverage/adequate coverage?: Yes  Type/kind of coverage: [employer]  Is patient able to afford co-pays/deductibles?: Yes  Is patient able to afford HME or supplies?: Yes  Does patient have an established [facility] PCP?: Yes  Able to access?: Yes  Does the patient have a lack of adequate coverage?: No  Specialist Appointment  Did the patient come to the ED to see a specialist?: No  Does the patient have a pending specialist referral?: No  Does the patient have a specialist appointment made?: No  PCP Follow Up Appointment  Has the patient had an appointment with a primary care provider in the past year?: Yes  Approximate date: 9/10/24  Provider: Rubi Littlejohn MD  Does the patient have a follow up appontment with a PCP?: Yes  Upcoming appointment date: 10/3/24  Provider: Rubi Littlejohn MD  When was the last time you saw your PCP?: 9/10/24  Medications  Is patient able to afford medication?: Yes  Is patient unable to get medication due to lack of transportation?: No  Psychological  Does the patient have psycho-social concerns?: Yes  What concerns does the patient have?: Anxiety and/or Depression (Comment:  Hx of depression charted)  Food  Does the patient have concerns about food?: No  Communication/Education  Does the patient have limited English proficiency/English not primary language?: No  Does patient have low literacy and/or low health literacy?: Yes  Does patient have concerns with care?: No  Does patient have dissatisfaction with care?: No  Other Financial Concerns  Does the patient have immediate financial distress?: No  Does the patient have general financial concerns?: No  Other Social Barriers/Concerns  Does the patient have any additional barriers or concerns?: Other (see comments) (Comment: Chronic Conditions)  Primary Barrier  Barriers identified: Cognitive barrier (health literacy, language and communication, etc.), Psychological barrier (mistrust, anxiety, etc.)  Root Cause of ED Utilization: Chronic Conditions  Plan to address Chronic Conditions: Remind patient of upcoming PCP/specialist appointment within 24 to 48 hours before scheduled appt  Next steps: Provided Education, Scheduled Appointment/Referral  Scheduled Appointment Date: 10/3/24  Appointment Reminder Date: 10/1/24  Was education/educational materials provided surrounding PCP services/creating a medical home?: Yes Was education verbal or written?: Written     Was education/educational materials provided surrounding low cost, healthy foods?: Yes Was education verbal or written?: Written     Was education/educational materials provided surrounding other items? If so, use comment to explain.: Yes Was education verbal or written?: Written   Additional Documentation: Pt was seen in the ED on 9/23/24 for Syncope; Right hip pain; Right leg pain; Left knee pain. ED Navigator spoke with pt for Post ED visit follow up navigation to assist with scheduling a 7-day Post ED visit follow up appt. Pt states that she had contacted pcp to schedule a 7-day Post ED visit follow up appt and was scheduled on 10/3/24 w/pcp beyond the 7-day follow up guidelines.  Pt declined assistance with scheduling an earlier appt. Pt states that she does not have transportation issues and has no additional needs at this time.  Closing Encounter.    Elana Nigel           Social History     Socioeconomic History    Marital status:      Spouse name: Campos Luther    Number of children: 2   Tobacco Use    Smoking status: Never     Passive exposure: Past    Smokeless tobacco: Never   Substance and Sexual Activity    Alcohol use: No    Drug use: No    Sexual activity: Yes     Partners: Male     Birth control/protection: Post-menopausal     Social Determinants of Health     Financial Resource Strain: Low Risk  (9/24/2024)    Overall Financial Resource Strain (CARDIA)     Difficulty of Paying Living Expenses: Not very hard   Food Insecurity: No Food Insecurity (9/24/2024)    Hunger Vital Sign     Worried About Running Out of Food in the Last Year: Never true     Ran Out of Food in the Last Year: Never true   Transportation Needs: No Transportation Needs (9/24/2024)    PRAPARE - Transportation     Lack of Transportation (Medical): No     Lack of Transportation (Non-Medical): No   Physical Activity: Insufficiently Active (5/19/2024)    Exercise Vital Sign     Days of Exercise per Week: 3 days     Minutes of Exercise per Session: 20 min   Stress: Stress Concern Present (9/24/2024)    [employer] [place] [employer] - Occupational Stress Questionnaire     Feeling of Stress : Rather much   Housing Stability: Low Risk  (9/24/2024)    Housing Stability Vital Sign     Unable to Pay for Housing in the Last Year: No     Homeless in the Last Year: No       Plan:   Pt was seen in the ED on 9/23/24 for Syncope; Right hip pain; Right leg pain; Left knee pain. ED Navigator spoke with pt for Post ED visit follow up navigation to assist with scheduling a 7-day Post ED visit follow up appt. Pt states that she had contacted pcp to schedule a 7-day Post ED visit follow up appt and was  scheduled on 10/3/24 w/pcp beyond the 7-day follow up guidelines. Pt declined assistance with scheduling an earlier appt. Pt states that she does not have transportation issues and has no additional needs at this time.  Closing Encounter.    Elana Manning      Appointment made with: Rubi Littlejohn MD

## 2024-09-26 ENCOUNTER — TELEPHONE (OUTPATIENT)
Dept: SURGERY | Facility: CLINIC | Age: 74
End: 2024-09-26
Payer: MEDICARE

## 2024-09-26 NOTE — TELEPHONE ENCOUNTER
Called patient to schedule appointment with  per request from manager. pt states she is unable to take on any additional appointments at this time and would like a call back in a couple of weeks to try to coordinate scheduling.

## 2024-10-02 ENCOUNTER — TELEPHONE (OUTPATIENT)
Dept: PSYCHIATRY | Facility: CLINIC | Age: 74
End: 2024-10-02
Payer: MEDICARE

## 2024-10-02 ENCOUNTER — PATIENT OUTREACH (OUTPATIENT)
Dept: EMERGENCY MEDICINE | Facility: HOSPITAL | Age: 74
End: 2024-10-02
Payer: MEDICARE

## 2024-10-03 ENCOUNTER — OFFICE VISIT (OUTPATIENT)
Dept: INTERNAL MEDICINE | Facility: CLINIC | Age: 74
End: 2024-10-03
Payer: MEDICARE

## 2024-10-03 DIAGNOSIS — I10 PRIMARY HYPERTENSION: Primary | ICD-10-CM

## 2024-10-03 PROCEDURE — G2211 COMPLEX E/M VISIT ADD ON: HCPCS | Mod: HCNC,95,, | Performed by: FAMILY MEDICINE

## 2024-10-03 PROCEDURE — 4010F ACE/ARB THERAPY RXD/TAKEN: CPT | Mod: HCNC,CPTII,95, | Performed by: FAMILY MEDICINE

## 2024-10-03 PROCEDURE — 99214 OFFICE O/P EST MOD 30 MIN: CPT | Mod: HCNC,95,, | Performed by: FAMILY MEDICINE

## 2024-10-03 PROCEDURE — 3044F HG A1C LEVEL LT 7.0%: CPT | Mod: HCNC,CPTII,95, | Performed by: FAMILY MEDICINE

## 2024-10-03 PROCEDURE — 3066F NEPHROPATHY DOC TX: CPT | Mod: HCNC,CPTII,95, | Performed by: FAMILY MEDICINE

## 2024-10-03 PROCEDURE — 1157F ADVNC CARE PLAN IN RCRD: CPT | Mod: HCNC,CPTII,95, | Performed by: FAMILY MEDICINE

## 2024-10-03 NOTE — PROGRESS NOTES
The patient location is: [facility] waiting room  The chief complaint leading to consultation is: Follow Up    Visit type: audiovisual    Face to Face time with patient: 38  38 minutes of total time spent on the encounter, which includes face to face time and non-face to face time preparing to see the patient (eg, review of tests), Obtaining and/or reviewing separately obtained history, Documenting clinical information in the electronic or other health record, Independently interpreting results (not separately reported) and communicating results to the patient/family/caregiver, or Care coordination (not separately reported).     Each patient to whom he or she provides medical services by telemedicine is:  (1) informed of the relationship between the physician and patient and the respective role of any other health care provider with respect to management of the patient; and (2) notified that he or she may decline to receive medical services by telemedicine and may withdraw from such care at any time.    Notes:    Subjective:       Patient ID: Susu Luther is a 74 y.o. female.    Chief Complaint: No chief complaint on file.    Susu Luther is a 74 y.o. female who presents to clinic for follow up. Presents with her daughter.     She was informed by her 's team of the recommendation for hospice today. His heart stopped when they arrive to the hospital ROSC was achieved. Informed her  does not qualify for chemotherapy, recommended to start home hospice. She remains prayerful of his situation.     HTN- 2 ED visits with elevated blood pressure and dizziness, was suppose to have an appointment with Dr. Hobbs, but rescheduled due to appointment and hospitalization of her . She has not been checking her blood pressure after discharge from the ED with [facility] program.    Chest Pain- she reports adherence with her medications, but has been taking them as prescribed due to  hectic schedule dealing with her 's medical condition    Hypertension  This is a chronic problem. The current episode started more than 1 year ago. The problem is unchanged. The problem is resistant. Associated symptoms include anxiety, blurred vision, chest pain, headaches, malaise/fatigue, orthopnea, palpitations, peripheral edema, shortness of breath and sweats. There are no associated agents to hypertension. Risk factors for coronary artery disease include obesity and stress. Past treatments include nothing. The current treatment provides no improvement. Compliance problems include exercise.      Review of Systems   Constitutional:  Positive for malaise/fatigue.   Eyes:  Positive for blurred vision.   Respiratory:  Positive for shortness of breath.    Cardiovascular:  Positive for chest pain, palpitations and orthopnea.   Neurological:  Positive for headaches.       Objective:      Physical Exam  Constitutional:       General: She is not in acute distress.     Comments: Tearful during video call   HENT:      Head: Normocephalic and atraumatic.   Eyes:      General: No scleral icterus.        Right eye: No discharge.         Left eye: No discharge.   Pulmonary:      Effort: Pulmonary effort is normal.   Neurological:      Mental Status: She is alert and oriented to person, place, and time.   Psychiatric:         Mood and Affect: Mood normal.         Behavior: Behavior normal.         Assessment:       1. Primary hypertension        Plan:   1. Primary hypertension  Assessment & Plan:  Chronic, uncontrolled, reports inconsistent use of medication, advised and strongly encouraged Mrs. Luther to take her medication as prescribed.  - Current Hypertension Medications:   Hypertension Medications               carvediloL (COREG) 12.5 MG tablet Take 1 tablet (12.5 mg total) by mouth 2 (two) times daily with meals.    cloNIDine (CATAPRES) 0.1 MG tablet Take 2 tablets (0.2 mg total) by mouth 2 (two) times daily as  needed (blood pressure greater than 170/90).    hydrALAZINE (APRESOLINE) 100 MG tablet Take 1 tablet (100 mg total) by mouth 3 (three) times daily.    torsemide (DEMADEX) 20 MG Tab Take 2 tablets (40 mg total) by mouth once daily.    valsartan (DIOVAN) 80 MG tablet Take 2 tablets (160 mg total) by mouth 2 (two) times daily.          -continue lifestyle modification with low sodium diet and exercise   -discussed hypertension disease course and importance of treating high blood pressure  -patient understood and advised of risk of untreated blood pressure.  ER precautions were given   for symptoms of hypertensive urgency and emergency.           RTC as scheduled below:    Future Appointments   Date Time Provider Department Center   10/18/2024 12:35 PM LABORATORY, [facility] LAB [facility]   10/18/2024  1:00 PM Blank Hernandez LCSW [facility] PSYCH [facility]   10/22/2024  3:00 PM Jaylyn Kraft FNP [facility] HEM ONC [facility]   11/4/2024  2:20 PM Rubi Littlejohn MD [facility]   11/15/2024  3:30 PM Elier Hernandez MD [facility] RHEUM [facility]   12/6/2024  2:20 PM Virginia Vincent PASnehalC [facility] IN PN  Medical C   1/16/2025 12:30 PM Stephon Adamson MD [facility] RHEU  Medical C       Rubi Littlejohn MD  [facility] [address]  [place] [address]  [phone]

## 2024-10-04 ENCOUNTER — TELEPHONE (OUTPATIENT)
Dept: INTERNAL MEDICINE | Facility: CLINIC | Age: 74
End: 2024-10-04
Payer: MEDICARE

## 2024-10-04 NOTE — TELEPHONE ENCOUNTER
I returned a call back to the pt's sister Francisca who states that the pt's   this morning and the pt is not doing well . She was scheduled for a virtual with psych this morning but, that did not happen. The services will be at [facility] on  and [facility] not set on time yet approx 11 am once finalized with  home. Please give a call at your convenience . I gave deepest condolences to the pt and her family from her [facility] staff . [email or web address]

## 2024-10-04 NOTE — TELEPHONE ENCOUNTER
----- Message from Re sent at 10/4/2024 10:18 AM CDT -----  Contact: [phone] Pts sister  Pts sister is calling in regards to the pts  passing this morning. Pt is asking for a call back please ASAP. Thank you

## 2024-10-14 ENCOUNTER — PATIENT MESSAGE (OUTPATIENT)
Dept: HEMATOLOGY/ONCOLOGY | Facility: CLINIC | Age: 74
End: 2024-10-14
Payer: MEDICARE

## 2024-10-14 ENCOUNTER — PATIENT MESSAGE (OUTPATIENT)
Dept: CARDIOLOGY | Facility: CLINIC | Age: 74
End: 2024-10-14
Payer: MEDICARE

## 2024-10-14 DIAGNOSIS — I50.32 CHRONIC DIASTOLIC CONGESTIVE HEART FAILURE: ICD-10-CM

## 2024-10-14 DIAGNOSIS — I10 HYPERTENSION, UNSPECIFIED TYPE: ICD-10-CM

## 2024-10-14 NOTE — ASSESSMENT & PLAN NOTE
Chronic, uncontrolled, reports inconsistent use of medication, advised and strongly encouraged Mrs. Luther to take her medication as prescribed.  - Current Hypertension Medications:   Hypertension Medications               carvediloL (COREG) 12.5 MG tablet Take 1 tablet (12.5 mg total) by mouth 2 (two) times daily with meals.    cloNIDine (CATAPRES) 0.1 MG tablet Take 2 tablets (0.2 mg total) by mouth 2 (two) times daily as needed (blood pressure greater than 170/90).    hydrALAZINE (APRESOLINE) 100 MG tablet Take 1 tablet (100 mg total) by mouth 3 (three) times daily.    torsemide (DEMADEX) 20 MG Tab Take 2 tablets (40 mg total) by mouth once daily.    valsartan (DIOVAN) 80 MG tablet Take 2 tablets (160 mg total) by mouth 2 (two) times daily.          -continue lifestyle modification with low sodium diet and exercise   -discussed hypertension disease course and importance of treating high blood pressure  -patient understood and advised of risk of untreated blood pressure.  ER precautions were given   for symptoms of hypertensive urgency and emergency.

## 2024-10-15 ENCOUNTER — OFFICE VISIT (OUTPATIENT)
Dept: CARDIOLOGY | Facility: CLINIC | Age: 74
End: 2024-10-15
Payer: MEDICARE

## 2024-10-15 DIAGNOSIS — Z86.711 HISTORY OF PULMONARY EMBOLISM: ICD-10-CM

## 2024-10-15 DIAGNOSIS — M79.606 PAIN OF LOWER EXTREMITY, UNSPECIFIED LATERALITY: ICD-10-CM

## 2024-10-15 DIAGNOSIS — C50.411 MALIGNANT NEOPLASM OF UPPER-OUTER QUADRANT OF RIGHT BREAST IN FEMALE, ESTROGEN RECEPTOR POSITIVE: ICD-10-CM

## 2024-10-15 DIAGNOSIS — Z86.73 HISTORY OF CVA (CEREBROVASCULAR ACCIDENT): ICD-10-CM

## 2024-10-15 DIAGNOSIS — R06.02 SOB (SHORTNESS OF BREATH): ICD-10-CM

## 2024-10-15 DIAGNOSIS — R53.1 RIGHT SIDED WEAKNESS: ICD-10-CM

## 2024-10-15 DIAGNOSIS — E66.813 CLASS 3 SEVERE OBESITY WITH SERIOUS COMORBIDITY AND BODY MASS INDEX (BMI) OF 40.0 TO 44.9 IN ADULT, UNSPECIFIED OBESITY TYPE: ICD-10-CM

## 2024-10-15 DIAGNOSIS — I71.40 ABDOMINAL AORTIC ANEURYSM (AAA) WITHOUT RUPTURE, UNSPECIFIED PART: ICD-10-CM

## 2024-10-15 DIAGNOSIS — I10 HYPERTENSION, UNSPECIFIED TYPE: Primary | ICD-10-CM

## 2024-10-15 DIAGNOSIS — Z17.0 MALIGNANT NEOPLASM OF UPPER-OUTER QUADRANT OF RIGHT BREAST IN FEMALE, ESTROGEN RECEPTOR POSITIVE: ICD-10-CM

## 2024-10-15 DIAGNOSIS — R94.31 PROLONGED Q-T INTERVAL ON ECG: ICD-10-CM

## 2024-10-15 DIAGNOSIS — E66.01 CLASS 3 SEVERE OBESITY WITH SERIOUS COMORBIDITY AND BODY MASS INDEX (BMI) OF 40.0 TO 44.9 IN ADULT, UNSPECIFIED OBESITY TYPE: ICD-10-CM

## 2024-10-15 DIAGNOSIS — F33.1 MAJOR DEPRESSIVE DISORDER, RECURRENT EPISODE, MODERATE WITH ANXIOUS DISTRESS: ICD-10-CM

## 2024-10-15 DIAGNOSIS — I71.41 PARARENAL ABDOMINAL AORTIC ANEURYSM (AAA) WITHOUT RUPTURE: ICD-10-CM

## 2024-10-15 DIAGNOSIS — E78.5 HYPERLIPIDEMIA, UNSPECIFIED HYPERLIPIDEMIA TYPE: ICD-10-CM

## 2024-10-15 DIAGNOSIS — M79.2 NEUROPATHIC PAIN: ICD-10-CM

## 2024-10-15 DIAGNOSIS — I50.32 CHRONIC DIASTOLIC CONGESTIVE HEART FAILURE: ICD-10-CM

## 2024-10-15 DIAGNOSIS — R79.89 ELEVATED PLASMA METANEPHRINES: ICD-10-CM

## 2024-10-15 DIAGNOSIS — N18.31 CHRONIC KIDNEY DISEASE, STAGE 3A: ICD-10-CM

## 2024-10-15 DIAGNOSIS — G47.33 OSA (OBSTRUCTIVE SLEEP APNEA): ICD-10-CM

## 2024-10-15 DIAGNOSIS — I10 PRIMARY HYPERTENSION: ICD-10-CM

## 2024-10-15 DIAGNOSIS — R60.9 EDEMA, UNSPECIFIED TYPE: ICD-10-CM

## 2024-10-15 PROCEDURE — 99214 OFFICE O/P EST MOD 30 MIN: CPT | Mod: HCNC,95,, | Performed by: STUDENT IN AN ORGANIZED HEALTH CARE EDUCATION/TRAINING PROGRAM

## 2024-10-15 PROCEDURE — 1157F ADVNC CARE PLAN IN RCRD: CPT | Mod: HCNC,CPTII,95, | Performed by: STUDENT IN AN ORGANIZED HEALTH CARE EDUCATION/TRAINING PROGRAM

## 2024-10-15 PROCEDURE — 3066F NEPHROPATHY DOC TX: CPT | Mod: HCNC,CPTII,95, | Performed by: STUDENT IN AN ORGANIZED HEALTH CARE EDUCATION/TRAINING PROGRAM

## 2024-10-15 PROCEDURE — 4010F ACE/ARB THERAPY RXD/TAKEN: CPT | Mod: HCNC,CPTII,95, | Performed by: STUDENT IN AN ORGANIZED HEALTH CARE EDUCATION/TRAINING PROGRAM

## 2024-10-15 PROCEDURE — 3044F HG A1C LEVEL LT 7.0%: CPT | Mod: HCNC,CPTII,95, | Performed by: STUDENT IN AN ORGANIZED HEALTH CARE EDUCATION/TRAINING PROGRAM

## 2024-10-15 NOTE — PROGRESS NOTES
Subjective:   Patient ID:  Susu Luther is a 74 y.o. female who presents for follow up of No chief complaint on file.      The patient location is: home  The chief complaint leading to consultation is: chest pan    Visit type: audiovisual    Face to Face time with patient: 15 min  15 minutes of total time spent on the encounter, which includes face to face time and non-face to face time preparing to see the patient (eg, review of tests), Obtaining and/or reviewing separately obtained history, Documenting clinical information in the electronic or other health record, Independently interpreting results (not separately reported) and communicating results to the patient/family/caregiver, or Care coordination (not separately reported).         Each patient to whom he or she provides medical services by telemedicine is:  (1) informed of the relationship between the physician and patient and the respective role of any other health care provider with respect to management of the patient; and (2) notified that he or she may decline to receive medical services by telemedicine and may withdraw from such care at any time.    Notes:      12/1/20  71 yo female, care establish. Prior cardiologist Dr ackerman   [facility] HTN, CVA (2009), Right breast CA, Lumpectomy 3/2019, h/o PE off OAC 2 yrs ago.  AAA, s/p transcutaneous patch by Dr. Bonds, obesity knee OA imbalanced walker dependent  C/o SOB after walking few steps and dizziness. Had vision issue 1 month ago due to uncontrolled HTN  No chest pain  Sleeps with 2 pillows  Decent appetites  Leg calf pain worse at night  No smoking/drinking  ekg today NSR LVH.    ECH normal EF, grade II DD, LAE and PAP 56 mmHG   Chest CTA negative for PE  BP high    A1c controlled    S/p Open subpectoral lymph node biopsy on the right on 12/02/2020 by Dr. Starks  Still right chest, under arm and shoudler supersensitive pain      Shortness of Breath  Associated symptoms include  chest pain and leg swelling. Pertinent negatives include no abdominal pain, claudication, fever, headaches, neck pain, orthopnea, PND, rash, sputum production, syncope, vomiting or wheezing.   Chest Pain   Associated symptoms include palpitations and shortness of breath. Pertinent negatives include no abdominal pain, back pain, claudication, cough, diaphoresis, dizziness, fever, headaches, irregular heartbeat, malaise/fatigue, nausea, near-syncope, numbness, orthopnea, PND, sputum production, syncope, vomiting or weakness.   Her past medical history is significant for CHF.   Pertinent negatives for past medical history include no seizures.   Congestive Heart Failure  Associated symptoms include chest pain, fatigue, palpitations and shortness of breath. Pertinent negatives include no abdominal pain, claudication or near-syncope.   Palpitations   Associated symptoms include chest pain and shortness of breath. Pertinent negatives include no coughing, diaphoresis, dizziness, fever, irregular heartbeat, malaise/fatigue, nausea, near-syncope, numbness, syncope, vomiting or weakness.   Fatigue  Associated symptoms include chest pain and fatigue. Pertinent negatives include no abdominal pain, coughing, diaphoresis, fever, headaches, joint swelling, nausea, neck pain, numbness, rash, vomiting or weakness.     2/28/22  Patient was admitted to [facility] for shortness of breath.  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  Started on heparin drip in transition to oral anticoagulation, now on Eliquis.  Recurrent PE.  On Femara. Has another lump in breast on right side, recently seen on PET scan.   Due to TANNER, was told to stop hctz, telmisartan.  She has ran out of clonidine. Does not take the ASA, hydralazine, amlodipine.   Blood pressure normotensive.  Reports severe headaches.  Has been out of Fioricet.  Echocardiogram with normal EF  Reports shortness of breath, chest heaviness  mostly right-sided.      3/14/22  Still having SOB due to PE.  No bleeding issues while on eliquis.  Chest pain is substernal to right sided.   Lasix doesn't seem to help.   A reports intermittent leg pain right > left.  DVT ultrasound in-hospital with no evidence of DVT.  Following Hematology-Oncology.  Patient does not want surgery if needed for possible breast cancer.  Denies syncope, fever, chills.         4/18/22  Comes in with daughter who lives in [place].  Concerned that she may oxygen issues and may need home oxygen. O2 98%  Reports LE swelling and SOB  Reports chest pain comes on with SOB, did not have chest pain prior to PE  Has not been on lasix, has been held  Reports balancing issues- can do PT once symptoms improve   Denies syncope, fever, chills.       5/16/22  Has been feeling weak last couple days  Feels chest tightness with walking, also CURIEL- feels worse than before. 97% O2 in clinic   Reports dizziness after taking medications   BP low today   Says recurrent cancer may be spreading   No bleeding on eliquis   Reports orthopnea, PND.  Not feeling well- Does not want to the hospital     Denies syncope.      6/13/22  Not feeling well today  Reports chest pain and SOB worsening over the last 2 weeks   Ddimer trending, downTroponin neg and BNP nl on 5/16/22  EKG today without significant abnormalities   Reports recent diagnosis of breast cancer is progressing  Reports right-sided arm weakness  Patient has been increasingly stressed    Denies syncope, lower extremity swelling.      7/6/2022   went to emergency room 6/13/2022, was worked up for stroke  MRI brain negative  Repeat CTA chest without PE, right-sided mass 6 cm, stable  All blood pressure medications.  Due to hypotension  Started taking Lasix today  Has significant lower extremity pain bilateral, reports blue feet at times      8/9/22  Virtual visit  Ambulates with walker   Had a fall recently due to syncopal episode, went to urgent care,  negative workup per patient  Syncopal event occurred while standing up  Last visit Lasix was increased, patient reports increased thirst and water intake  Has also been taking carvedilol, reports low blood pressure  Chest pain worsened after syncopal event, has bruising on her body      9/7/22  Reports leg swelling, left  Has been taking eliquis also   Restarted taking lasix 2 weeks ago  Recently started on lyrica   Still wearing cardiac monitor  Still having SOB and chest discomfort  U/S arterial w/o significant stenosis   BP elevated, high at home  Lost 8 lbs since 9/1      10/12/22  Virtual visit  Doing well, still getting chest pain   Still has shortness of breath but has improved   Was able to go to a football game without any issues   DVT ultrasound without thrombus   Has been treated for gout, improvement of toe pain  Has been having intermittent blood pressure elevated readings at home        2/20/23  Stress test normal   BP elevated now  Has been more tired  Drinking lots of water  Chest pain has improved with imdur  SOB stable   Has chronic pain and chronic fatigue         3/21/23  Patient was recently admitted to the hospital for right arm weakness, TANNER, chest pain, shortness of breath   MRI/CT scan of the brain were unremarkable   CTA did not show PE   Had elevated creatinine, renal ultrasound did not show arterial stenosis  BP noted to be significantly elevated   Blood pressure meds were changed, patient only taking Entresto and amlodipine   Coreg was stopped  Reports right-sided breast lump/mass currently being investigated    Today, reports still having shortness of breath  Waiting to have ultrasound done for right breast lump  Blood pressure stable today      4/25/23  Virtual visit   Continues to have intermittent chest pain, shortness of breath   Has not fallen since last visit   Blood pressure has been stable   Metanephrine levels are elevated  No bleeding on Eliquis      9/18/23  Virtual visit    Was admitted to [facility] 7/23 for chest pain and recurrent falls and worsening swelling in her legs  Lasix was switched to torsemide  Continues to have leg pain and swelling  Has been seeing Maria Isabel in [facility]  Currently taking torsemide 20 mg b.i.d.  No improvement in her leg pain and leg swelling since discharge from the hospital   Recent DVT ultrasound of right leg with no clot  Has been wearing compression stockings but difficult recently due to leg swelling      9/25/23  Virtual visit   Took metolazone once  Taking torsemide 40 mg b.i.d.  Swelling improving per patient but not a lot pain   CKD mildly worsening on recent labs        9/29/23  Virtual visit  Worsening renal function with taking increased dose of torsemide and metolazone x 2 doses   Swelling has improved and patient now able to walk w/o pain  Was told to hold diuretics for 1 week and have f/u labs  No SOB  Has not gotten contacted by lymphedema clinic at [facility]      10/25/23  Swelling has improved in legs, still having pain with ambulation   Insurance did not cover lymphedema clinic at [facility] or [facility]  Has shortness of breath intermittent      1/10/24  Going to lymphedema clinic in [facility] improving, now able to walk on them  Fell x2 1 month ago   Stopped amlodipine  Fluctuations in BP, can be high at times  Wearing compression stockings  BP stable   Says gabapentin may be causing weight gain  Weight going up significantly   SOB stable   Has been eating out- has been salty       3/20/24   Virtual visit  Has been having significant lower extremity swelling  Reports someone took her torsemide  Blood pressure has been significantly elevated   Has been short of breath  Getting iron infusions and seen pain management  Taking clonidine p.r.n.   Compliant with all medications  Potassium level high limit of normal recently      5/22/24  Granddaughter just graduated in [place] and traveled to [place]- had headache, BP was high - was also having  SOb  Wants to travel internationally for wedding anniversary- 52 years   BP elevated  Still having SOB   Could not check weight today- the scale not working   Was getting legs wrapped 3 x week- not helping edema   Wearing compression stockings   Not taking entresto- ran out  Started on valsartan by [facility]        24  Has been having more chest pain since lat visit, worsen than normal chest pain  Cannot lay down due to SOB  Worsened SOB with exertion   Did not get fraxiga - did not receive in mail yet  Has been sweating - chronic for >1 year, gotten worse   Bp high today  Reports intermittent left arm tingling      10/15/24  virtual  Her  just  2 cancer,  was 3 days ago   Patient now staying with her daughter for a little while in [facility]   Still having chest pain, reproducible on exam, tender   Does not take Tylenol or NSAIDs   Currently on Eliquis   Blood pressure has been high        EKG 24 NSR, PVCs, LVH  Ekg 24 NSR, PVCs, LAD, LVH, prolonged  ms   Ekg 23 NSR, LAFB, LVH  EKG 23 NSR, PVCs, LAD, minimal LVH  EKG 22 NSR, LAD, LVH, 93 bpm, qtc 455 ms  EKG 22 SB, incomplete RBBB, LVH, septal infarct, qtc 422 ms         Echo 22  The left ventricle is normal in size with concentric hypertrophy and normal systolic function.  The estimated ejection fraction is 60%.  Normal left ventricular diastolic function.  Normal right ventricular size with normal right ventricular systolic function.  Mild to moderate tricuspid regurgitation.  Normal central venous pressure (3 mmHg).  The estimated PA systolic pressure is 36 mmHg.       Echo 2019  CONCLUSIONS     1 - Mild left atrial enlargement.     2 - Concentric hypertrophy.     3 - No wall motion abnormalities.     4 - Normal left ventricular systolic function (EF 60-65%).     5 - Impaired LV relaxation, elevated LAP (grade 2 diastolic dysfunction).     6 - Normal right ventricular systolic function .     7 - The  estimated PA systolic pressure is 36 mmHg.     8 - Mild tricuspid regurgitation.        Past Medical History:   Diagnosis Date    Cataract     Bilateral    Chronic diastolic congestive heart failure 2020    Dizziness 2023    Encounter for blood transfusion     History of repair of aneurysm of abdominal aorta using endovascular stent graft     DR Bonds     of psychiatric care     Hypertension     Major depressive disorder, single episode, moderate with anxious distress 2020    Malignant neoplasm of upper-outer quadrant of right breast in female, estrogen receptor positive 2019    radiation    Psychiatric problem     Sleep apnea     Sleep difficulties     Stroke 2009    no residual defect    Therapy        Past Surgical History:   Procedure Laterality Date    APPENDECTOMY      AXILLARY NODE DISSECTION Right 2020    Procedure: LYMPHADENECTOMY, AXILLARY;  Surgeon: Artemio Starks MD;  Location: [facility] OR;  Service: General;  Laterality: Right;    BIOPSY OF AXILLARY NODE Right 2021    Procedure: BIOPSY, LYMPH NODE, AXILLARY;  Surgeon: Artemio Sutton MD;  Location: [address];  Service: General;  Laterality: Right;    BREAST BIOPSY      2019    BREAST LUMPECTOMY Right     2019     SECTION      x 1    OOPHORECTOMY      right     SENTINEL LYMPH NODE BIOPSY Right 2019    Procedure: BIOPSY, LYMPH NODE, SENTINEL;  Surgeon: Artemio Starks MD;  Location: [facility];  Service: General;  Laterality: Right;    splenic artery aneurysm repair      TONSILLECTOMY         Social History     Tobacco Use    Smoking status: Never     Passive exposure: Past    Smokeless tobacco: Never   Substance Use Topics    Alcohol use: No    Drug use: No       Family History   Problem Relation Name Age of Onset    Diabetes Maternal Grandmother Susu Jennings     Diabetes Maternal Grandfather N/A     Diabetes Mother Balbina Tena     Hypertension Mother Balbina Tena     Sickle cell anemia  Daughter      Breast cancer Neg Hx      Colon cancer Neg Hx      Ovarian cancer Neg Hx           Review of Systems   Constitutional: Positive for fatigue. Negative for decreased appetite, diaphoresis, fever, malaise/fatigue and night sweats.   HENT:  Negative for nosebleeds.    Eyes:  Negative for blurred vision and double vision.   Cardiovascular:  Positive for chest pain, leg swelling and palpitations. Negative for claudication, irregular heartbeat, near-syncope, orthopnea, paroxysmal nocturnal dyspnea and syncope.   Respiratory:  Positive for shortness of breath. Negative for cough, sleep disturbances due to breathing, snoring, sputum production and wheezing.    Endocrine: Negative for cold intolerance and polyuria.   Hematologic/Lymphatic: Does not bruise/bleed easily.   Skin:  Negative for rash.   Musculoskeletal:  Negative for back pain, falls, joint pain, joint swelling and neck pain.        Right chest breast and shoulder pain   Gastrointestinal:  Negative for abdominal pain, heartburn, nausea and vomiting.   Genitourinary:  Negative for dysuria, frequency and hematuria.   Neurological:  Negative for difficulty with concentration, dizziness, focal weakness, headaches, light-headedness, numbness, seizures and weakness.   Psychiatric/Behavioral:  Negative for depression, memory loss and substance abuse. The patient does not have insomnia.    Allergic/Immunologic: Negative for HIV exposure and hives.     There were no vitals filed for this visit.              Objective:   Physical Exam  Constitutional:       Comments: Mild distress.    HENT:      Head: Normocephalic.   Eyes:      Pupils: Pupils are equal, round, and reactive to light.   Neck:      Thyroid: No thyromegaly.      Vascular: Normal carotid pulses. No carotid bruit or JVD.   Cardiovascular:      Rate and Rhythm: Normal rate and regular rhythm. No extrasystoles are present.     Chest Wall: PMI is not displaced.      Pulses: Normal pulses.      Heart  sounds: Normal heart sounds. No murmur heard.     No gallop. No S3 sounds.   Pulmonary:      Effort: No respiratory distress.      Breath sounds: Normal breath sounds. No stridor.   Abdominal:      General: Bowel sounds are normal.      Palpations: Abdomen is soft.      Tenderness: There is no abdominal tenderness. There is no rebound.   Musculoskeletal:         General: Normal range of motion.      Comments: Tender to palpitation   Skin:     Findings: No rash.   Neurological:      Mental Status: She is alert and oriented to person, place, and time.   Psychiatric:         Behavior: Behavior normal.         Lab Results   Component Value Date    CHOL 197 01/18/2024    CHOL 234 (H) 02/28/2023    CHOL 229 (H) 04/18/2022     Lab Results   Component Value Date    HDL 50 01/18/2024    HDL 44 02/28/2023    HDL 45 04/18/2022     Lab Results   Component Value Date    LDLCALC 120.4 01/18/2024    LDLCALC 151.8 02/28/2023    LDLCALC 150.4 04/18/2022     Lab Results   Component Value Date    TRIG 133 01/18/2024    TRIG 191 (H) 02/28/2023    TRIG 168 (H) 04/18/2022     Lab Results   Component Value Date    CHOLHDL 25.4 01/18/2024    CHOLHDL 18.8 (L) 02/28/2023    CHOLHDL 19.7 (L) 04/18/2022       Chemistry        Component Value Date/Time     09/23/2024 1827    K 3.8 09/23/2024 1827     09/23/2024 1827    CO2 22 (L) 09/23/2024 1827    BUN 23 09/23/2024 1827    CREATININE 1.1 09/23/2024 1827     09/23/2024 1827        Component Value Date/Time    CALCIUM 10.3 09/23/2024 1827    ALKPHOS 94 09/23/2024 1827    AST 13 09/23/2024 1827    ALT 7 (L) 09/23/2024 1827    BILITOT 0.3 09/23/2024 1827    ESTGFRAFRICA 37 (A) 07/06/2022 1312    EGFRNONAA 32 (A) 07/06/2022 1312          Lab Results   Component Value Date    HGBA1C 5.7 07/26/2024     Lab Results   Component Value Date    TSH 1.269 06/25/2024     Lab Results   Component Value Date    INR 1.1 03/11/2023    INR 0.9 02/15/2022    INR 1.0 11/10/2020     Lab Results    Component Value Date    WBC 11.37 09/23/2024    HGB 13.2 09/23/2024    HCT 41.4 09/23/2024    MCV 81 (L) 09/23/2024     09/23/2024     BMP  Sodium   Date Value Ref Range Status   09/23/2024 142 136 - 145 mmol/L Final     Potassium   Date Value Ref Range Status   09/23/2024 3.8 3.5 - 5.1 mmol/L Final     Chloride   Date Value Ref Range Status   09/23/2024 108 95 - 110 mmol/L Final     CO2   Date Value Ref Range Status   09/23/2024 22 (L) 23 - 29 mmol/L Final     BUN   Date Value Ref Range Status   09/23/2024 23 8 - 23 mg/dL Final     Creatinine   Date Value Ref Range Status   09/23/2024 1.1 0.5 - 1.4 mg/dL Final     Calcium   Date Value Ref Range Status   09/23/2024 10.3 8.7 - 10.5 mg/dL Final     Anion Gap   Date Value Ref Range Status   09/23/2024 12 8 - 16 mmol/L Final     eGFR if    Date Value Ref Range Status   07/06/2022 37 (A) >60 mL/min/1.73 m^2 Final     eGFR if non    Date Value Ref Range Status   07/06/2022 32 (A) >60 mL/min/1.73 m^2 Final     Comment:     Calculation used to obtain the estimated glomerular filtration  rate (eGFR) is the CKD-EPI equation.        BNP  @LABRCNTIP(BNP,BNPTRIAGEBLO)@  @LABRCNTIP(troponini)@  CrCl cannot be calculated (Patient's most recent lab result is older than the maximum 7 days allowed.).  No results found in the last 24 hours.  No results found in the last 24 hours.  No results found in the last 24 hours.    Assessment:      1. Hypertension, unspecified type    2. Chronic diastolic congestive heart failure    3. Prolonged Q-T interval on ECG    4. Pararenal abdominal aortic aneurysm (AAA) without rupture    5. NILS (obstructive sleep apnea)    6. Elevated plasma metanephrines    7. Class 3 severe obesity with serious comorbidity and body mass index (BMI) of 40.0 to 44.9 in adult, unspecified obesity type    8. Malignant neoplasm of upper-outer quadrant of right breast in female, estrogen receptor positive    9. History of pulmonary  embolism    10. Major depressive disorder, recurrent episode, moderate with anxious distress    11. Neuropathic pain    12. Chronic kidney disease, stage 3a    13. Pain of lower extremity, unspecified laterality    14. Abdominal aortic aneurysm (AAA) without rupture, unspecified part    15. Edema, unspecified type    16. History of CVA (cerebrovascular accident)    17. Right sided weakness    18. SOB (shortness of breath)    19. Hyperlipidemia, unspecified hyperlipidemia type    20. Primary hypertension            Plan:     Left leg swelling/lymphedema- chronic  Continue torsemide 40 mg daily, farxiga  holding metolazone for now  Venous ultrasound 10/22 and 9/23 without DVT  Seeing lymphedema clinic    Diastolic heart failure  Echo 3/23 with normal EF, mild-mod TR  Renal function mild worse, BNP up at 273    Hypertension  Elevated  Increase to Coreg 12.5 mg b.i.d  Continue hydralazine 100 mg t.i.d., valsartan 160 mg bid  Clonidine prn for BP >170 or >110  Renal artery U/S 3/23, no stenosis   Metanephrines high plasma, urine negative     Chest pain/shortness of breath- worsening   CTA 3/23 did not show PE   Stress test 1/23 normal stress test   Chest pain reproducible- not cardiac in nature  Try tylenol prn    Syncope- resolved   14 day monitor 8/22- 7.27% PACs, essentially asymptomatic    History of right breast cancer  Follows with Hematology-Oncology    Right leg pain-stable  DVT ultrasound 10/22 without evidence of DVT   Normal ABIs 3/22  Arterial ultrasound 8/22 without significant stenosis    Recurrent pulmonary embolus   Recurrent PE as of 2/22  Continue OAC    History of CVA  Carotid ultrasound 6/22 no significant stenosis, MRI brain 6/22 no abnormality  Currently not on aspirin as she is taking Eliquis  Continue statin    HLD  ->120 as of 1/24  Continue statin    AAA s/p transcutaneous patch  Stable    Morbid obesity, BMI > 40  Low-salt, low-fat diet  Exercise as tolerated      All labs and cardiac  procedures reviewed.      Return to clinic 6 weeks - ailyn Hobbs MD  Cardiology Staff

## 2024-10-18 RX ORDER — CARVEDILOL 12.5 MG/1
12.5 TABLET ORAL 2 TIMES DAILY WITH MEALS
Qty: 180 TABLET | Refills: 3 | Status: SHIPPED | OUTPATIENT
Start: 2024-10-18 | End: 2025-10-18

## 2024-10-21 ENCOUNTER — TELEPHONE (OUTPATIENT)
Dept: HEMATOLOGY/ONCOLOGY | Facility: CLINIC | Age: 74
End: 2024-10-21
Payer: MEDICARE

## 2024-10-21 DIAGNOSIS — F54 PSYCHOLOGICAL FACTORS AFFECTING MEDICAL CONDITION: ICD-10-CM

## 2024-10-21 PROBLEM — I26.99 ACUTE PULMONARY EMBOLISM: Status: RESOLVED | Noted: 2022-02-15 | Resolved: 2024-10-21

## 2024-10-21 NOTE — TELEPHONE ENCOUNTER
Called patient to [facility] due to missedl ab appt, patient states her  just passed, she will give our office a call to [facility].

## 2024-10-25 ENCOUNTER — PATIENT MESSAGE (OUTPATIENT)
Dept: PSYCHIATRY | Facility: CLINIC | Age: 74
End: 2024-10-25
Payer: MEDICARE

## 2024-10-25 DIAGNOSIS — M79.604 BILATERAL LEG PAIN: ICD-10-CM

## 2024-10-25 DIAGNOSIS — M79.605 BILATERAL LEG PAIN: ICD-10-CM

## 2024-10-25 RX ORDER — DICLOFENAC SODIUM 10 MG/G
2 GEL TOPICAL DAILY
Qty: 400 G | Refills: 1 | Status: SHIPPED | OUTPATIENT
Start: 2024-10-25

## 2024-10-25 RX ORDER — ALPRAZOLAM 0.5 MG/1
0.5 TABLET ORAL 2 TIMES DAILY PRN
Qty: 60 TABLET | Refills: 0 | Status: SHIPPED | OUTPATIENT
Start: 2024-10-25 | End: 2024-11-24

## 2024-10-26 ENCOUNTER — PATIENT MESSAGE (OUTPATIENT)
Dept: INTERNAL MEDICINE | Facility: CLINIC | Age: 74
End: 2024-10-26
Payer: MEDICARE

## 2024-10-26 DIAGNOSIS — Z74.09 MOBILITY IMPAIRED: Primary | ICD-10-CM

## 2024-10-28 ENCOUNTER — TELEPHONE (OUTPATIENT)
Dept: PSYCHIATRY | Facility: CLINIC | Age: 74
End: 2024-10-28
Payer: MEDICARE

## 2024-10-28 ENCOUNTER — TELEPHONE (OUTPATIENT)
Dept: SURGERY | Facility: CLINIC | Age: 74
End: 2024-10-28
Payer: MEDICARE

## 2024-10-31 ENCOUNTER — TELEPHONE (OUTPATIENT)
Dept: INTERNAL MEDICINE | Facility: CLINIC | Age: 74
End: 2024-10-31
Payer: MEDICARE

## 2024-11-01 NOTE — TELEPHONE ENCOUNTER
I called her meds in to her mailorder pharmacy
Please advise  
Please advise her to follow up with cardiology about the swelling
Pt requesting refills and stating her leg is still swollen, please advise  
Abdominal Pain, N/V/D

## 2024-11-04 ENCOUNTER — OFFICE VISIT (OUTPATIENT)
Dept: INTERNAL MEDICINE | Facility: CLINIC | Age: 74
End: 2024-11-04
Payer: MEDICARE

## 2024-11-04 DIAGNOSIS — M79.605 BILATERAL LEG PAIN: ICD-10-CM

## 2024-11-04 DIAGNOSIS — R26.9 UNSPECIFIED ABNORMALITIES OF GAIT AND MOBILITY: ICD-10-CM

## 2024-11-04 DIAGNOSIS — E78.5 HYPERLIPIDEMIA, UNSPECIFIED HYPERLIPIDEMIA TYPE: ICD-10-CM

## 2024-11-04 DIAGNOSIS — M79.604 PAIN IN BOTH LOWER EXTREMITIES: ICD-10-CM

## 2024-11-04 DIAGNOSIS — J30.2 SEASONAL ALLERGIES: ICD-10-CM

## 2024-11-04 DIAGNOSIS — R79.89 LOW VITAMIN D LEVEL: ICD-10-CM

## 2024-11-04 DIAGNOSIS — G62.9 NEUROPATHY: Primary | ICD-10-CM

## 2024-11-04 DIAGNOSIS — I50.32 CHRONIC DIASTOLIC CONGESTIVE HEART FAILURE: ICD-10-CM

## 2024-11-04 DIAGNOSIS — Z91.81 RISK FOR FALLS: ICD-10-CM

## 2024-11-04 DIAGNOSIS — M79.605 PAIN IN BOTH LOWER EXTREMITIES: ICD-10-CM

## 2024-11-04 DIAGNOSIS — C50.411 MALIGNANT NEOPLASM OF UPPER-OUTER QUADRANT OF RIGHT BREAST IN FEMALE, ESTROGEN RECEPTOR POSITIVE: ICD-10-CM

## 2024-11-04 DIAGNOSIS — R42 DIZZINESS: ICD-10-CM

## 2024-11-04 DIAGNOSIS — R23.2 HOT FLASHES: ICD-10-CM

## 2024-11-04 DIAGNOSIS — M79.604 BILATERAL LEG PAIN: ICD-10-CM

## 2024-11-04 DIAGNOSIS — G44.229 CHRONIC TENSION-TYPE HEADACHE, NOT INTRACTABLE: ICD-10-CM

## 2024-11-04 DIAGNOSIS — I10 HYPERTENSION, UNSPECIFIED TYPE: ICD-10-CM

## 2024-11-04 DIAGNOSIS — I10 PRIMARY HYPERTENSION: ICD-10-CM

## 2024-11-04 DIAGNOSIS — Z17.0 MALIGNANT NEOPLASM OF UPPER-OUTER QUADRANT OF RIGHT BREAST IN FEMALE, ESTROGEN RECEPTOR POSITIVE: ICD-10-CM

## 2024-11-04 DIAGNOSIS — G89.4 CHRONIC PAIN DISORDER: ICD-10-CM

## 2024-11-04 DIAGNOSIS — R73.09 OTHER ABNORMAL GLUCOSE: ICD-10-CM

## 2024-11-04 DIAGNOSIS — I10 ESSENTIAL HYPERTENSION: ICD-10-CM

## 2024-11-04 PROCEDURE — 4010F ACE/ARB THERAPY RXD/TAKEN: CPT | Mod: HCNC,CPTII,95, | Performed by: FAMILY MEDICINE

## 2024-11-04 PROCEDURE — 1157F ADVNC CARE PLAN IN RCRD: CPT | Mod: HCNC,CPTII,95, | Performed by: FAMILY MEDICINE

## 2024-11-04 PROCEDURE — 99214 OFFICE O/P EST MOD 30 MIN: CPT | Mod: HCNC,95,, | Performed by: FAMILY MEDICINE

## 2024-11-04 PROCEDURE — 3044F HG A1C LEVEL LT 7.0%: CPT | Mod: HCNC,CPTII,95, | Performed by: FAMILY MEDICINE

## 2024-11-04 PROCEDURE — 3066F NEPHROPATHY DOC TX: CPT | Mod: HCNC,CPTII,95, | Performed by: FAMILY MEDICINE

## 2024-11-04 RX ORDER — VALSARTAN 80 MG/1
160 TABLET ORAL 2 TIMES DAILY
Qty: 180 TABLET | Refills: 3 | Status: SHIPPED | OUTPATIENT
Start: 2024-11-04

## 2024-11-04 RX ORDER — DICLOFENAC SODIUM 10 MG/G
2 GEL TOPICAL DAILY
Qty: 400 G | Refills: 1 | Status: SHIPPED | OUTPATIENT
Start: 2024-11-04

## 2024-11-04 RX ORDER — HYDRALAZINE HYDROCHLORIDE 100 MG/1
100 TABLET, FILM COATED ORAL 3 TIMES DAILY
Qty: 270 TABLET | Refills: 3 | Status: SHIPPED | OUTPATIENT
Start: 2024-11-04

## 2024-11-04 RX ORDER — DAPAGLIFLOZIN 10 MG/1
10 TABLET, FILM COATED ORAL DAILY
Qty: 90 TABLET | Refills: 3 | Status: SHIPPED | OUTPATIENT
Start: 2024-11-04

## 2024-11-04 RX ORDER — ASPIRIN 325 MG
50000 TABLET, DELAYED RELEASE (ENTERIC COATED) ORAL
Qty: 30 CAPSULE | Refills: 0 | Status: SHIPPED | OUTPATIENT
Start: 2024-11-04

## 2024-11-04 RX ORDER — LETROZOLE 2.5 MG/1
2.5 TABLET, FILM COATED ORAL DAILY
Qty: 90 TABLET | Refills: 3 | Status: SHIPPED | OUTPATIENT
Start: 2024-11-04

## 2024-11-04 RX ORDER — CARVEDILOL 12.5 MG/1
12.5 TABLET ORAL 2 TIMES DAILY WITH MEALS
Qty: 180 TABLET | Refills: 3 | Status: SHIPPED | OUTPATIENT
Start: 2024-11-04 | End: 2025-11-04

## 2024-11-04 RX ORDER — ATORVASTATIN CALCIUM 20 MG/1
20 TABLET, FILM COATED ORAL NIGHTLY
Qty: 90 TABLET | Refills: 3 | Status: SHIPPED | OUTPATIENT
Start: 2024-11-04

## 2024-11-04 RX ORDER — CLONIDINE HYDROCHLORIDE 0.1 MG/1
0.2 TABLET ORAL 2 TIMES DAILY PRN
Qty: 180 TABLET | Refills: 0 | Status: SHIPPED | OUTPATIENT
Start: 2024-11-04 | End: 2025-02-02

## 2024-11-04 RX ORDER — GABAPENTIN 300 MG/1
300 CAPSULE ORAL 3 TIMES DAILY
Qty: 90 CAPSULE | Refills: 11 | Status: SHIPPED | OUTPATIENT
Start: 2024-11-04 | End: 2024-11-22 | Stop reason: SDUPTHER

## 2024-11-04 RX ORDER — MECLIZINE HCL 12.5 MG 12.5 MG/1
12.5 TABLET ORAL 3 TIMES DAILY PRN
Qty: 30 TABLET | Refills: 3 | Status: SHIPPED | OUTPATIENT
Start: 2024-11-04

## 2024-11-04 RX ORDER — TAMSULOSIN HYDROCHLORIDE 0.4 MG/1
0.4 CAPSULE ORAL DAILY
Qty: 30 CAPSULE | Refills: 0 | Status: SHIPPED | OUTPATIENT
Start: 2024-11-04 | End: 2024-12-04

## 2024-11-04 RX ORDER — TIZANIDINE 4 MG/1
4 TABLET ORAL 2 TIMES DAILY PRN
Qty: 60 TABLET | Refills: 2 | Status: SHIPPED | OUTPATIENT
Start: 2024-11-04 | End: 2024-11-22 | Stop reason: SDUPTHER

## 2024-11-04 RX ORDER — VENLAFAXINE HYDROCHLORIDE 75 MG/1
75 CAPSULE, EXTENDED RELEASE ORAL DAILY
Qty: 30 CAPSULE | Refills: 5 | Status: SHIPPED | OUTPATIENT
Start: 2024-11-04 | End: 2024-11-19 | Stop reason: SDUPTHER

## 2024-11-04 RX ORDER — LEVOCETIRIZINE DIHYDROCHLORIDE 5 MG/1
5 TABLET, FILM COATED ORAL NIGHTLY
Qty: 90 TABLET | Refills: 3 | Status: SHIPPED | OUTPATIENT
Start: 2024-11-04

## 2024-11-04 NOTE — PROGRESS NOTES
The patient location is: Home  The chief complaint leading to consultation is: Follow-up, need for refill    Visit type: audiovisual    Face to Face time with patient: 30 minutes   35 minutes of total time spent on the encounter, which includes face to face time and non-face to face time preparing to see the patient (eg, review of tests), Obtaining and/or reviewing separately obtained history, Documenting clinical information in the electronic or other health record, Independently interpreting results (not separately reported) and communicating results to the patient/family/caregiver, or Care coordination (not separately reported).     Each patient to whom he or she provides medical services by telemedicine is:  (1) informed of the relationship between the physician and patient and the respective role of any other health care provider with respect to management of the patient; and (2) notified that he or she may decline to receive medical services by telemedicine and may withdraw from such care at any time.    Notes:    Subjective:       Patient ID: Susu Luther is a 74 y.o. female.    Chief Complaint: No chief complaint on file.    Reports she is under significant stress due to recent passing of her  along with dispute over money and property with one of her daughters. She has noted numbness and tingling in bilateral feet (ankles to toes). Decreased sensation in her feet. She also has numbness in her hands. She has had 2 recent falls, cannot feel her feet, uses cane and walker more consistently due to fear of falling. She would like to evaluated for a wheelchair. Her daughter has concerns that she would be as active if she gets a wheelchair     Her headaches have been worse but she is unsure if it is due to stress or her elevated blood pressure. Dr. Hobbs (cardiology) has sent in blood pressure medication to her pharmacy.         Review of Systems   Constitutional:  Positive for activity change.  Negative for unexpected weight change.   HENT:  Negative for hearing loss, rhinorrhea and trouble swallowing.    Eyes:  Negative for discharge and visual disturbance.   Respiratory:  Positive for chest tightness. Negative for wheezing.    Cardiovascular:  Positive for chest pain and palpitations.   Gastrointestinal:  Negative for blood in stool, constipation, diarrhea and vomiting.   Endocrine: Negative for polydipsia and polyuria.   Genitourinary:  Negative for difficulty urinating, dysuria, hematuria and menstrual problem.   Musculoskeletal:  Positive for arthralgias and joint swelling. Negative for neck pain.   Neurological:  Positive for weakness and headaches.   Psychiatric/Behavioral:  Positive for dysphoric mood. Negative for confusion.        Objective:      Physical Exam    Assessment:       1. Neuropathy    2. Risk for falls    3. Hyperlipidemia, unspecified hyperlipidemia type    4. Hypertension, unspecified type    5. Chronic diastolic congestive heart failure    6. Low vitamin D level    7. Primary hypertension    8. Bilateral leg pain    9. Pain in both lower extremities    10. Chronic pain disorder    11. Malignant neoplasm of upper-outer quadrant of right breast in female, estrogen receptor positive    12. Seasonal allergies    13. Dizziness    14. Hot flashes    15. Essential hypertension    16. Unspecified abnormalities of gait and mobility    17. Other abnormal glucose    18. Chronic tension-type headache, not intractable        Plan:   1. Neuropathy  Acute, uncontrolled   -     VITAMIN B12; Future; Expected date: 11/04/2024  -     FOLATE; Future; Expected date: 11/04/2024  -     HEMOGLOBIN A1C; Future; Expected date: 11/04/2024    2. Risk for falls  Reports 2 recent falls, potentially secondary to lack of sensation    3. Hyperlipidemia, unspecified hyperlipidemia type  Chronic, stable   -     atorvastatin (LIPITOR) 20 MG tablet; Take 1 tablet (20 mg total) by mouth every evening.  Dispense: 90  tablet; Refill: 3    4. Hypertension, unspecified type  Chronic, uncontrolled  -     carvediloL (COREG) 12.5 MG tablet; Take 1 tablet (12.5 mg total) by mouth 2 (two) times daily with meals.  Dispense: 180 tablet; Refill: 3  -     Comprehensive Metabolic Panel; Future; Expected date: 11/04/2024    5. Chronic diastolic congestive heart failure  Chronic, stable  -     carvediloL (COREG) 12.5 MG tablet; Take 1 tablet (12.5 mg total) by mouth 2 (two) times daily with meals.  Dispense: 180 tablet; Refill: 3  -     dapagliflozin propanediol (FARXIGA) 10 mg tablet; Take 1 tablet (10 mg total) by mouth once daily.  Dispense: 90 tablet; Refill: 3    6. Low vitamin D level  Chronic, stable, continue Rx med  -     cholecalciferol, vitamin D3, 1,250 mcg (50,000 unit) capsule; Take 1 capsule (50,000 Units total) by mouth every 7 days.  Dispense: 30 capsule; Refill: 0    7. Primary hypertension  Chronic, uncontrolled, continue Rx med, advised adherence with medication as prescribed   -     cloNIDine (CATAPRES) 0.1 MG tablet; Take 2 tablets (0.2 mg total) by mouth 2 (two) times daily as needed (blood pressure greater than 170/90).  Dispense: 180 tablet; Refill: 0  -     hydrALAZINE (APRESOLINE) 100 MG tablet; Take 1 tablet (100 mg total) by mouth 3 (three) times daily.  Dispense: 270 tablet; Refill: 3  -     Comprehensive Metabolic Panel; Future; Expected date: 11/04/2024    8. Bilateral leg pain  Chronic, uncontrolled, continue Rx med  -     diclofenac sodium (VOLTAREN) 1 % Gel; Apply 2 grams topically once daily.  Dispense: 400 g; Refill: 1    9. Pain in both lower extremities  Chronic, uncontrolled  -     gabapentin (NEURONTIN) 300 MG capsule; Take 1 capsule (300 mg total) by mouth 3 (three) times daily.  Dispense: 90 capsule; Refill: 11    10. Chronic pain disorder  -     gabapentin (NEURONTIN) 300 MG capsule; Take 1 capsule (300 mg total) by mouth 3 (three) times daily.  Dispense: 90 capsule; Refill: 11  -     tiZANidine  (ZANAFLEX) 4 MG tablet; Take 1 tablet (4 mg total) by mouth 2 (two) times daily as needed.  Dispense: 60 tablet; Refill: 2    11. Malignant neoplasm of upper-outer quadrant of right breast in female, estrogen receptor positive  Overview:  Followed by hematology/oncology    Orders:  -     letrozole (FEMARA) 2.5 mg Tab; Take 1 tablet (2.5 mg total) by mouth once daily.  Dispense: 90 tablet; Refill: 3    12. Seasonal allergies  -     levocetirizine (XYZAL) 5 MG tablet; Take 1 tablet (5 mg total) by mouth every evening.  Dispense: 90 tablet; Refill: 3    13. Dizziness  -     meclizine (ANTIVERT) 12.5 mg tablet; Take 1 tablet (12.5 mg total) by mouth 3 (three) times daily as needed for Dizziness.  Dispense: 30 tablet; Refill: 3  -     Comprehensive Metabolic Panel; Future; Expected date: 11/04/2024    14. Hot flashes  -     venlafaxine (EFFEXOR-XR) 75 MG 24 hr capsule; Take 1 capsule (75 mg total) by mouth once daily.  Dispense: 30 capsule; Refill: 5    15. Essential hypertension    16. Unspecified abnormalities of gait and mobility  -     VITAMIN B12; Future; Expected date: 11/04/2024  -     FOLATE; Future; Expected date: 11/04/2024  -     HEMOGLOBIN A1C; Future; Expected date: 11/04/2024  -     valsartan (DIOVAN) 80 MG tablet; Take 2 tablets (160 mg total) by mouth 2 (two) times daily.  Dispense: 180 tablet; Refill: 3    17. Other abnormal glucose  -     HEMOGLOBIN A1C; Future; Expected date: 11/04/2024    18. Chronic tension-type headache, not intractable  Chronic, uncontrolled  -     atogepant 60 mg Tab; Take 1 tablet (60 mg total) by mouth once daily.  Dispense: 90 tablet; Refill: 1    Other orders  -     tamsulosin (FLOMAX) 0.4 mg Cap; Take 1 capsule (0.4 mg total) by mouth once daily.  Dispense: 30 capsule; Refill: 0           Future Appointments   Date Time Provider Department Center   12/3/2024 10:00 AM Arlene Hobbs MD [facility]   12/6/2024  2:20 PM Virginia Vincent PA-C [facility]  [facility]   1/16/2025 12:30 PM Stephon Adamson MD [facility]       Rubi Littlejohn MD  [facility] [address]  [place] [address]  [phone]

## 2024-11-05 ENCOUNTER — DOCUMENTATION ONLY (OUTPATIENT)
Dept: PSYCHIATRY | Facility: CLINIC | Age: 74
End: 2024-11-05
Payer: MEDICARE

## 2024-11-05 NOTE — PROGRESS NOTES
Patient in [place] at time of appointment. Appt rescheduled due to licensing restrictions. Gathered quick updates. Patient's   3 weeks ago- likely due to cancer but family was unaware until the day before he . Patient also with new breast concerns. No SI/HI. Patient with family and will return to [place] next week

## 2024-11-07 ENCOUNTER — PATIENT MESSAGE (OUTPATIENT)
Dept: SURGERY | Facility: CLINIC | Age: 74
End: 2024-11-07
Payer: MEDICARE

## 2024-11-13 ENCOUNTER — TELEPHONE (OUTPATIENT)
Dept: PSYCHIATRY | Facility: CLINIC | Age: 74
End: 2024-11-13
Payer: MEDICARE

## 2024-11-15 ENCOUNTER — TELEPHONE (OUTPATIENT)
Dept: INTERNAL MEDICINE | Facility: CLINIC | Age: 74
End: 2024-11-15
Payer: MEDICARE

## 2024-11-15 NOTE — TELEPHONE ENCOUNTER
----- Message from Rubi Littlejohn MD sent at 11/15/2024  2:42 PM CST -----  Patient needs to have a formal wheelchair evaluation.  ----- Message -----  From: Cat Sorenson MA  Sent: 11/14/2024   3:43 PM CST  To: Rubi Littlejohn MD    Please read and advise  ----- Message -----  From: Guanaco Mccabe  Sent: 11/14/2024   2:34 PM CST  To: Eron Venegas Staff    Good afternoon! Messaging regarding patient above., Can you please order to Wheelchair vendor  [employer] [phone]    If you have any questions, you may reach me at [phone] option # 1 option #5    Thank you  Guanaco CAMERON

## 2024-11-19 ENCOUNTER — PATIENT MESSAGE (OUTPATIENT)
Dept: INTERNAL MEDICINE | Facility: CLINIC | Age: 74
End: 2024-11-19
Payer: MEDICARE

## 2024-11-19 DIAGNOSIS — F54 PSYCHOLOGICAL FACTORS AFFECTING MEDICAL CONDITION: ICD-10-CM

## 2024-11-19 DIAGNOSIS — G44.229 CHRONIC TENSION-TYPE HEADACHE, NOT INTRACTABLE: ICD-10-CM

## 2024-11-19 DIAGNOSIS — R23.2 HOT FLASHES: ICD-10-CM

## 2024-11-19 NOTE — TELEPHONE ENCOUNTER
Pt requesting these two medications be sent to [facility] atogepant 60 mg and venlafaxine 75 mg. Thanks //kah

## 2024-11-21 RX ORDER — VENLAFAXINE HYDROCHLORIDE 75 MG/1
75 CAPSULE, EXTENDED RELEASE ORAL DAILY
Qty: 30 CAPSULE | Refills: 5 | Status: SHIPPED | OUTPATIENT
Start: 2024-11-21 | End: 2025-05-20

## 2024-11-21 RX ORDER — ALPRAZOLAM 0.5 MG/1
0.5 TABLET ORAL 2 TIMES DAILY PRN
Qty: 60 TABLET | Refills: 0 | Status: SHIPPED | OUTPATIENT
Start: 2024-11-21 | End: 2024-12-21

## 2024-11-22 ENCOUNTER — OFFICE VISIT (OUTPATIENT)
Dept: PAIN MEDICINE | Facility: CLINIC | Age: 74
End: 2024-11-22
Payer: MEDICARE

## 2024-11-22 DIAGNOSIS — M79.605 PAIN IN BOTH LOWER EXTREMITIES: ICD-10-CM

## 2024-11-22 DIAGNOSIS — M54.2 CERVICALGIA: ICD-10-CM

## 2024-11-22 DIAGNOSIS — G89.4 CHRONIC PAIN DISORDER: Primary | ICD-10-CM

## 2024-11-22 DIAGNOSIS — M50.30 DDD (DEGENERATIVE DISC DISEASE), CERVICAL: ICD-10-CM

## 2024-11-22 DIAGNOSIS — M79.604 PAIN IN BOTH LOWER EXTREMITIES: ICD-10-CM

## 2024-11-22 RX ORDER — TIZANIDINE 4 MG/1
4 TABLET ORAL 2 TIMES DAILY PRN
Qty: 60 TABLET | Refills: 2 | Status: SHIPPED | OUTPATIENT
Start: 2024-11-22 | End: 2025-02-20

## 2024-11-22 RX ORDER — TOPIRAMATE 25 MG/1
25 TABLET ORAL 2 TIMES DAILY
Qty: 60 TABLET | Refills: 11 | Status: SHIPPED | OUTPATIENT
Start: 2024-11-22 | End: 2025-11-22

## 2024-11-22 RX ORDER — GABAPENTIN 300 MG/1
300 CAPSULE ORAL 3 TIMES DAILY
Qty: 90 CAPSULE | Refills: 11 | Status: SHIPPED | OUTPATIENT
Start: 2024-11-22 | End: 2025-11-22

## 2024-11-22 NOTE — PROGRESS NOTES
Established chronic pain patient note (follow-up visit):    The patient location is: home  The chief complaint leading to consultation is: chronic pain, med refills    Visit type: audiovisual    Face to Face time with patient: 16-20 minutes of total time spent on the encounter, which includes face to face time and non-face to face time preparing to see the patient (eg, review of tests), Obtaining and/or reviewing separately obtained history, Documenting clinical information in the electronic or other health record, Independently interpreting results (not separately reported) and communicating results to the patient/family/caregiver, or Care coordination (not separately reported).     Each patient to whom he or she provides medical services by telemedicine is:  (1) informed of the relationship between the physician and patient and the respective role of any other health care provider with respect to management of the patient; and (2) notified that he or she may decline to receive medical services by telemedicine and may withdraw from such care at any time.    Notes:   Interval History (11/22/2024):   Susu Luther presents today for follow-up visit.  Patient was last seen on 9/4/2024.   Unfortunately since her last visit, her  abruptly passed away after having emergency surgery.   She is having difficulty dealing with his death as well as sleeping at night.    Patient has been falling more in the past year. She reports at least 6 falls so far this year. She does have neck/arm pain.   Patient is supposed to see a grief counselor soon.      Interval History (9/4/2024):    Susu Luther is a 74 y.o. female presents today for follow-up for medication refill for chronic back and leg pain.  She has been stable on a medication regimen consisting of tramadol, tizanidine, and gabapentin.  She was also concerned with her headache medication regimen and she would like to go back on Topamax.  She was very  concerned with her right breast mass that continues to enlarge and has had cancer of the same breast previously.      Patient denies night fever/night sweats, urinary incontinence, bowel incontinence, significant weight loss and significant motor weakness.   Patient denies any other complaints or concerns at this time.    Interval HPI 05/29/2024:  Susu Luther is a 74 y.o. female presents today for chronic pain involving her back and lower extremities.  She also continues to have shoulder pain and arm pain mostly on the right side.  She is currently using gabapentin 300 mg t.i.d., tizanidine 4 mg b.i.d. p.r.n., and has previously used tramadol, Norco, and/or Percocet has been short durations to control her pain.  Current pain intensity is 9/10.  She remembers that the tramadol was very effective in managing her pain on a daily basis when she had it available.    Interval HPI 03/01/2024:  Patient Susu Luther presents today for follow-up visit.  Patient is being seen today for review of lumbar MRI and hip x-rays.  She rates her pain today an 8.5/10.  She complains of lower back pain which radiates down the back of the legs down to the feet.  She has had a couple of falls which she feels aggravated her pain even more.  She has been in physical therapy which has helped some.   She also suffers from chronic lymphedema in the bilateral lower extremities and has an appointment in the upcoming couple of weeks to have her legs wrapped.    Interval History (2/2/2024):  Patient  presents today for follow-up visit.  Patient was last seen on 10/18/2023. Patient reports pain as 9/10 today.  She has a h/o lymphedema, currently in therapy for this. Attending three times a week. Utilizes compression garments on both legs.   Patient reports her BP has been elevated for the past 2 weeks. Has reached out to PCP regarding this.   Patient has pain in her lower back. Associates this to falling in her bathroom in July of  last year. States she does not even remember going to the bathroom. HH nurse came and called for assistance. She was admitted after this fall for 5 days.     Currently she has pain in her lower back and legs. She has diffuse leg pain  She also reports recent falls- 1 in January and 2 in December. Ambulates with a Rolator.      History of Present Illness 10/18/23:   Susu Lutehr is a 74 y.o. female  who is presenting with a chief complaint of right axillary and upper extremity Pain. The patient began experiencing this problem insidiously, and the pain has been gradually worsening over the past 3 month(s). The pain is described as throbbing, cramping, burning, aching and heavy and is located in the right axilla . Pain is intermittent and lasts hours. The  pain is nonradiating. The patient rates her pain a 7 out of ten and interferes with activities of daily living a 7 out of ten. Pain is exacerbated by rasing the right arm, lying on the right side, and is improved by rest. Patient reports no prior trauma, no prior spinal surgery  note, patient has a history of breast cancer    - pertinent negatives: No fever, No chills, No weight loss, No bladder dysfunction, No bowel dysfunction, No saddle anesthesia  - pertinent positives: none    - medications, other therapies tried (physical therapy, injections):     >> NSAIDs, Tylenol, Tramadol, Norco, Percocet and gabapentin    >> Has NOT previously undergone Physical Therapy    >> Has NOT previously undergone spinal injection/s      Imaging / Labs / Studies (reviewed on 11/22/2024):  2/29/2024 Lumbar MRI  FINDINGS:  Alignment: Normal.     Vertebrae: Normal marrow signal. No fracture.     Discs: Severe narrowing of the L5-S1 disc space.  Otherwise normal height and signal.     Cord: Normal.  Conus terminates at L1.     Degenerative findings:     T12-L1: No significant neural foraminal narrowing or spinal canal stenosis.     L1-L2: Mild facet arthropathy and minimal  annular bulge contributing to mild inferior left foraminal narrowing.  No significant spinal canal stenosis.     L2-L3: Mild facet arthropathy contributing to mild bilateral inferior foraminal narrowing.  No significant spinal canal stenosis.     L3-L4: Bilateral facet arthropathy and minimal left foraminal annular bulge contributing to mild bilateral inferior foraminal narrowing.  No significant spinal canal stenosis.     L4-L5: Bilateral hypertrophic facet arthropathy resulting in mild bilateral inferior foraminal narrowing.  No significant spinal canal stenosis.     L5-S1: Posterior endplate ridging and annular bulge.  Bilateral facet arthropathy.  Resultant mild to moderate left and mild right inferior foraminal narrowing.  No significant spinal canal stenosis.     Paraspinal muscles & soft tissues: Unremarkable.     Impression:     Multilevel lumbar spondylosis and degenerative disc disease, most pronounced at L5-S1.    2/2/2024 xray hips  FINDINGS:  The bony pelvis is intact. Both femoral heads are well seated within acetabula.  There is severe axial hip joint space narrowing on the right with prominent osteophyte formation identified.  The hip joint space on the left appears to be relatively well preserved.  No plain film evidence to suggest AVN of either femoral head.  Calcified phleboliths seen projecting over the pelvis.    Results for orders placed during the hospital encounter of 05/18/20    X-Ray Cervical Spine Complete 5 view    Narrative  EXAMINATION:  XR CERVICAL SPINE COMPLETE 5 VIEW    CLINICAL HISTORY:  Exposure to other specified factors, sequela.  Accident, sequela.    FINDINGS:  There is no cervical spine fracture or malalignment.  C5-6 spondylosis with anterior spurring.  Otherwise, unremarkable cervical spine x-ray.    Impression  See above.      Electronically signed by: Naun Jennings MD  Date:    05/18/2020  Time:    11:58      Review of Systems:  CONSTITUTIONAL: patient denies any fever,  chills, or weight loss  SKIN: patient denies any rash or itching  RESPIRATORY: patient denies having any shortness of breath  GASTROINTESTINAL: patient denies having any diarrhea, constipation, or bowel incontinence  GENITOURINARY: patient denies having any abnormal bladder function    MUSCULOSKELETAL:  - patient complains of the above noted pain/s (see chief pain complaint)    NEUROLOGICAL:   - pain as above  - strength in Upper extremities is intact, BILATERALLY  - sensation in Upper extremities is intact, BILATERALLY  - patient denies any loss of bowel or bladder control      PSYCHIATRIC: patient denies any change in mood    Other:  All other systems reviewed and are negative      Physical Exam:  Telemedicine Physical Exam:   There were no vitals filed for this visit.  There is no height or weight on file to calculate BMI.   (reviewed on 11/22/2024)     (Physical exam performed virtually with patient guided on specific actions and diagnostic maneuvers)  GENERAL: Well appearing, in no acute distress, alert and oriented x3.  Cooperative.  PSYCH:  Mood and affect appropriate.  SKIN: Skin color & texture with no obvious abnormalities.    HEAD/FACE:  Normocephalic, atraumatic.    PULM:  No difficulty breathing. No nasal flaring. No obvious wheezing.  EXTREMITIES: No obvious deformities. Moving all extremities well, appears to have symmetric strength throughout.  MUSCULOSKELETAL: No obvious atrophy abnormalities are noted.   NEURO: No obvious neurologic deficit.   GAIT: sitting.     Physical Exam: last in clinic visit:    General: Alert and oriented, in no apparent distress.  Gait: normal gait.  Skin: No rashes, No discoloration, No obvious lesions  HEENT: Normocephalic, atraumatic. Pupils equal and round.  Cardiovascular: Regular rate and rhythm , moderately significant peripheral edema present in the bilateral lower extremities and right upper extremity  Respiratory: Without audible wheezing, without use of accessory  muscles of respiration.    Musculoskeletal:  Musculoskeletal - Lumbar Spine:  - Spinal Sensation: Normal   - Pain on flexion of lumbar spine: Present  - Pain on extension of lumbar spine: Present   - TTP over the lumbar facet joints: Present   - TTP over the lumbar paraspinals: Present   - SLR test equivocal bilaterally    Strength:  UE R/L: D: 5/5; B: 5/5; T: 5/5; WF: 5/5; WE: 5/5; IO: 5/5;  LE R/L: HF: 5/5, HE: 5/5, KF: 5/5; KE: 5/5; FE: 5/5; FF: 5/5    Extremity Reflexes: Brisk and symmetric throughout.      Extremity Sensory: Sensation to pinprick and temperature symmetric. Proprioception intact.      Psych:  Mood and affect is appropriate      Assessment:    Susu Luther is a 74 y.o. year old female who is presenting with     Encounter Diagnoses   Name Primary?    Chronic pain disorder Yes    Pain in both lower extremities     DDD (degenerative disc disease), cervical     Cervicalgia      Plan:    1. Interventional: None at this time.    Anticoagulation: Eliquis      2. Pharmacologic:   Continue gabapentin 300 mg t.i.d. Refill provided today.  Continue Tizanidine 4 mg Po BID PRN. -We have discussed potential deleterious side effects associated with this medication including  dizziness, drowsiness, dry mouth or tingling sensation in the upper or lower extremities. Refill provided today.    Continue Topamax 25 mg b.i.d. for migraine management. Refill provided today.    An opioid pain contract was completed on 05/29/2024 after having an extensive conversation about chronic opioid use for pain management. We discussed the risks and benefits of opioids.  I discouraged the patient from escalation of opioid use and try to minimize its use to decrease chance of dependence, tolerance, and opioid-based hyperalgesia.  I reviewed the  in great detail and there are no inconsistencies and it is appropriate with patient's history.  There are no signs of aberrant drug behavior.  The opioid risk tool was also  completed, low risk.  Pt counseled about the side effects of long term use of opioids including dependence, tolerance, addiction, respiratory depression, somnelence , immune and endocrine dysfunction.  The patient expressed understanding.     report:  Reviewed and consistent with medication use as prescribed.        3. Rehabilitative: Continue home exercises and activities as tolerated.     4. Diagnostic:  Reviewed imaging available. Recommend MRI of cervical spine due to fall history. Patient wants to hold off at this time.    5. Consult/referral: Follow up with Breast surgery, external.  Patient has not see Dr. Papi Patel yet. Advised to reach out to reschedule appointment.                             Follow up with Grief Counselor.     6. Follow up:  3 months for medication refill      Visit today included increased complexity associated with the care of the episodic problem of chronic pain which was addressed and continue to manage the longitudinal care of the patient due to the serious and/or complex managed problem(s) listed above.    Virginia Vincent PA-C  Interventional Pain Medicine  [facility]

## 2024-11-25 DIAGNOSIS — I26.99 ACUTE PULMONARY EMBOLISM, UNSPECIFIED PULMONARY EMBOLISM TYPE, UNSPECIFIED WHETHER ACUTE COR PULMONALE PRESENT: ICD-10-CM

## 2024-11-29 DIAGNOSIS — I10 HYPERTENSION, UNSPECIFIED TYPE: ICD-10-CM

## 2024-11-29 RX ORDER — TORSEMIDE 20 MG/1
40 TABLET ORAL DAILY
Qty: 360 TABLET | Refills: 0 | Status: SHIPPED | OUTPATIENT
Start: 2024-11-29

## 2024-12-03 ENCOUNTER — PATIENT MESSAGE (OUTPATIENT)
Dept: CARDIOLOGY | Facility: CLINIC | Age: 74
End: 2024-12-03

## 2024-12-03 ENCOUNTER — OFFICE VISIT (OUTPATIENT)
Dept: CARDIOLOGY | Facility: CLINIC | Age: 74
End: 2024-12-03
Payer: MEDICARE

## 2024-12-03 DIAGNOSIS — R53.1 RIGHT SIDED WEAKNESS: ICD-10-CM

## 2024-12-03 DIAGNOSIS — Z17.0 MALIGNANT NEOPLASM OF UPPER-OUTER QUADRANT OF RIGHT BREAST IN FEMALE, ESTROGEN RECEPTOR POSITIVE: ICD-10-CM

## 2024-12-03 DIAGNOSIS — M79.606 PAIN OF LOWER EXTREMITY, UNSPECIFIED LATERALITY: ICD-10-CM

## 2024-12-03 DIAGNOSIS — I71.40 ABDOMINAL AORTIC ANEURYSM (AAA) WITHOUT RUPTURE, UNSPECIFIED PART: ICD-10-CM

## 2024-12-03 DIAGNOSIS — I26.99 RECURRENT PULMONARY EMBOLI: ICD-10-CM

## 2024-12-03 DIAGNOSIS — N18.31 CHRONIC KIDNEY DISEASE, STAGE 3A: ICD-10-CM

## 2024-12-03 DIAGNOSIS — Z86.73 HISTORY OF CVA (CEREBROVASCULAR ACCIDENT): ICD-10-CM

## 2024-12-03 DIAGNOSIS — G47.33 OSA (OBSTRUCTIVE SLEEP APNEA): ICD-10-CM

## 2024-12-03 DIAGNOSIS — E66.813 CLASS 3 SEVERE OBESITY WITH SERIOUS COMORBIDITY AND BODY MASS INDEX (BMI) OF 40.0 TO 44.9 IN ADULT, UNSPECIFIED OBESITY TYPE: ICD-10-CM

## 2024-12-03 DIAGNOSIS — I50.32 CHRONIC DIASTOLIC CONGESTIVE HEART FAILURE: ICD-10-CM

## 2024-12-03 DIAGNOSIS — E66.01 CLASS 3 SEVERE OBESITY WITH SERIOUS COMORBIDITY AND BODY MASS INDEX (BMI) OF 40.0 TO 44.9 IN ADULT, UNSPECIFIED OBESITY TYPE: ICD-10-CM

## 2024-12-03 DIAGNOSIS — R06.02 SOB (SHORTNESS OF BREATH): ICD-10-CM

## 2024-12-03 DIAGNOSIS — E78.5 HYPERLIPIDEMIA, UNSPECIFIED HYPERLIPIDEMIA TYPE: ICD-10-CM

## 2024-12-03 DIAGNOSIS — Z86.711 HISTORY OF PULMONARY EMBOLISM: ICD-10-CM

## 2024-12-03 DIAGNOSIS — I10 HYPERTENSION, UNSPECIFIED TYPE: Primary | ICD-10-CM

## 2024-12-03 DIAGNOSIS — C50.411 MALIGNANT NEOPLASM OF UPPER-OUTER QUADRANT OF RIGHT BREAST IN FEMALE, ESTROGEN RECEPTOR POSITIVE: ICD-10-CM

## 2024-12-03 DIAGNOSIS — F33.1 MAJOR DEPRESSIVE DISORDER, RECURRENT EPISODE, MODERATE WITH ANXIOUS DISTRESS: ICD-10-CM

## 2024-12-03 DIAGNOSIS — M79.2 NEUROPATHIC PAIN: ICD-10-CM

## 2024-12-03 DIAGNOSIS — R79.89 ELEVATED PLASMA METANEPHRINES: ICD-10-CM

## 2024-12-03 PROCEDURE — 4010F ACE/ARB THERAPY RXD/TAKEN: CPT | Mod: HCNC,CPTII,95, | Performed by: STUDENT IN AN ORGANIZED HEALTH CARE EDUCATION/TRAINING PROGRAM

## 2024-12-03 PROCEDURE — 99214 OFFICE O/P EST MOD 30 MIN: CPT | Mod: HCNC,95,, | Performed by: STUDENT IN AN ORGANIZED HEALTH CARE EDUCATION/TRAINING PROGRAM

## 2024-12-03 PROCEDURE — 3044F HG A1C LEVEL LT 7.0%: CPT | Mod: HCNC,CPTII,95, | Performed by: STUDENT IN AN ORGANIZED HEALTH CARE EDUCATION/TRAINING PROGRAM

## 2024-12-03 PROCEDURE — 1157F ADVNC CARE PLAN IN RCRD: CPT | Mod: HCNC,CPTII,95, | Performed by: STUDENT IN AN ORGANIZED HEALTH CARE EDUCATION/TRAINING PROGRAM

## 2024-12-03 PROCEDURE — 3066F NEPHROPATHY DOC TX: CPT | Mod: HCNC,CPTII,95, | Performed by: STUDENT IN AN ORGANIZED HEALTH CARE EDUCATION/TRAINING PROGRAM

## 2024-12-03 NOTE — PROGRESS NOTES
Subjective:   Patient ID:  Susu Luther is a 74 y.o. female who presents for follow up of No chief complaint on file.      The patient location is: home  The chief complaint leading to consultation is:  Lower extremity swelling    Visit type: audiovisual    Face to Face time with patient: 15 min  15 minutes of total time spent on the encounter, which includes face to face time and non-face to face time preparing to see the patient (eg, review of tests), Obtaining and/or reviewing separately obtained history, Documenting clinical information in the electronic or other health record, Independently interpreting results (not separately reported) and communicating results to the patient/family/caregiver, or Care coordination (not separately reported).         Each patient to whom he or she provides medical services by telemedicine is:  (1) informed of the relationship between the physician and patient and the respective role of any other health care provider with respect to management of the patient; and (2) notified that he or she may decline to receive medical services by telemedicine and may withdraw from such care at any time.    Notes:      12/1/20  69 yo female, care establish. Prior cardiologist Dr ackerman   [facility] HTN, CVA (2009), Right breast CA, Lumpectomy 3/2019, h/o PE off OAC 2 yrs ago.  AAA, s/p transcutaneous patch by Dr. Bonds, obesity knee OA imbalanced walker dependent  C/o SOB after walking few steps and dizziness. Had vision issue 1 month ago due to uncontrolled HTN  No chest pain  Sleeps with 2 pillows  Decent appetites  Leg calf pain worse at night  No smoking/drinking  ekg today NSR LVH.    ECH normal EF, grade II DD, LAE and PAP 56 mmHG   Chest CTA negative for PE  BP high    A1c controlled    S/p Open subpectoral lymph node biopsy on the right on 12/02/2020 by Dr. Starks  Still right chest, under arm and shoudler supersensitive pain      Shortness of Breath  Associated  symptoms include chest pain and leg swelling. Pertinent negatives include no abdominal pain, claudication, fever, headaches, neck pain, orthopnea, PND, rash, sputum production, syncope, vomiting or wheezing.   Chest Pain   Associated symptoms include palpitations and shortness of breath. Pertinent negatives include no abdominal pain, back pain, claudication, cough, diaphoresis, dizziness, fever, headaches, irregular heartbeat, malaise/fatigue, nausea, near-syncope, numbness, orthopnea, PND, sputum production, syncope, vomiting or weakness.   Her past medical history is significant for CHF.   Pertinent negatives for past medical history include no seizures.   Congestive Heart Failure  Associated symptoms include chest pain, fatigue, palpitations and shortness of breath. Pertinent negatives include no abdominal pain, claudication or near-syncope.   Palpitations   Associated symptoms include chest pain and shortness of breath. Pertinent negatives include no coughing, diaphoresis, dizziness, fever, irregular heartbeat, malaise/fatigue, nausea, near-syncope, numbness, syncope, vomiting or weakness.   Fatigue  Associated symptoms include chest pain and fatigue. Pertinent negatives include no abdominal pain, coughing, diaphoresis, fever, headaches, joint swelling, nausea, neck pain, numbness, rash, vomiting or weakness.     2/28/22  Patient was admitted to [facility] for shortness of breath.  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  Started on heparin drip in transition to oral anticoagulation, now on Eliquis.  Recurrent PE.  On Femara. Has another lump in breast on right side, recently seen on PET scan.   Due to TANNER, was told to stop hctz, telmisartan.  She has ran out of clonidine. Does not take the ASA, hydralazine, amlodipine.   Blood pressure normotensive.  Reports severe headaches.  Has been out of Fioricet.  Echocardiogram with normal EF  Reports shortness of  breath, chest heaviness mostly right-sided.      3/14/22  Still having SOB due to PE.  No bleeding issues while on eliquis.  Chest pain is substernal to right sided.   Lasix doesn't seem to help.   A reports intermittent leg pain right > left.  DVT ultrasound in-hospital with no evidence of DVT.  Following Hematology-Oncology.  Patient does not want surgery if needed for possible breast cancer.  Denies syncope, fever, chills.         4/18/22  Comes in with daughter who lives in [place].  Concerned that she may oxygen issues and may need home oxygen. O2 98%  Reports LE swelling and SOB  Reports chest pain comes on with SOB, did not have chest pain prior to PE  Has not been on lasix, has been held  Reports balancing issues- can do PT once symptoms improve   Denies syncope, fever, chills.       5/16/22  Has been feeling weak last couple days  Feels chest tightness with walking, also CURIEL- feels worse than before. 97% O2 in clinic   Reports dizziness after taking medications   BP low today   Says recurrent cancer may be spreading   No bleeding on eliquis   Reports orthopnea, PND.  Not feeling well- Does not want to the hospital     Denies syncope.      6/13/22  Not feeling well today  Reports chest pain and SOB worsening over the last 2 weeks   Ddimer trending, downTroponin neg and BNP nl on 5/16/22  EKG today without significant abnormalities   Reports recent diagnosis of breast cancer is progressing  Reports right-sided arm weakness  Patient has been increasingly stressed    Denies syncope, lower extremity swelling.      7/6/2022   went to emergency room 6/13/2022, was worked up for stroke  MRI brain negative  Repeat CTA chest without PE, right-sided mass 6 cm, stable  All blood pressure medications.  Due to hypotension  Started taking Lasix today  Has significant lower extremity pain bilateral, reports blue feet at times      8/9/22  Virtual visit  Ambulates with walker   Had a fall recently due to syncopal episode,  went to urgent care, negative workup per patient  Syncopal event occurred while standing up  Last visit Lasix was increased, patient reports increased thirst and water intake  Has also been taking carvedilol, reports low blood pressure  Chest pain worsened after syncopal event, has bruising on her body      9/7/22  Reports leg swelling, left  Has been taking eliquis also   Restarted taking lasix 2 weeks ago  Recently started on lyrica   Still wearing cardiac monitor  Still having SOB and chest discomfort  U/S arterial w/o significant stenosis   BP elevated, high at home  Lost 8 lbs since 9/1      10/12/22  Virtual visit  Doing well, still getting chest pain   Still has shortness of breath but has improved   Was able to go to a football game without any issues   DVT ultrasound without thrombus   Has been treated for gout, improvement of toe pain  Has been having intermittent blood pressure elevated readings at home        2/20/23  Stress test normal   BP elevated now  Has been more tired  Drinking lots of water  Chest pain has improved with imdur  SOB stable   Has chronic pain and chronic fatigue         3/21/23  Patient was recently admitted to the hospital for right arm weakness, TANNER, chest pain, shortness of breath   MRI/CT scan of the brain were unremarkable   CTA did not show PE   Had elevated creatinine, renal ultrasound did not show arterial stenosis  BP noted to be significantly elevated   Blood pressure meds were changed, patient only taking Entresto and amlodipine   Coreg was stopped  Reports right-sided breast lump/mass currently being investigated    Today, reports still having shortness of breath  Waiting to have ultrasound done for right breast lump  Blood pressure stable today      4/25/23  Virtual visit   Continues to have intermittent chest pain, shortness of breath   Has not fallen since last visit   Blood pressure has been stable   Metanephrine levels are elevated  No bleeding on  Eliquis      9/18/23  Virtual visit   Was admitted to [facility] 7/23 for chest pain and recurrent falls and worsening swelling in her legs  Lasix was switched to torsemide  Continues to have leg pain and swelling  Has been seeing Maria Isabel in [facility]  Currently taking torsemide 20 mg b.i.d.  No improvement in her leg pain and leg swelling since discharge from the hospital   Recent DVT ultrasound of right leg with no clot  Has been wearing compression stockings but difficult recently due to leg swelling      9/25/23  Virtual visit   Took metolazone once  Taking torsemide 40 mg b.i.d.  Swelling improving per patient but not a lot pain   CKD mildly worsening on recent labs        9/29/23  Virtual visit  Worsening renal function with taking increased dose of torsemide and metolazone x 2 doses   Swelling has improved and patient now able to walk w/o pain  Was told to hold diuretics for 1 week and have f/u labs  No SOB  Has not gotten contacted by lymphedema clinic at [facility]      10/25/23  Swelling has improved in legs, still having pain with ambulation   Insurance did not cover lymphedema clinic at [facility] or [facility]  Has shortness of breath intermittent      1/10/24  Going to lymphedema clinic in [facility] improving, now able to walk on them  Fell x2 1 month ago   Stopped amlodipine  Fluctuations in BP, can be high at times  Wearing compression stockings  BP stable   Says gabapentin may be causing weight gain  Weight going up significantly   SOB stable   Has been eating out- has been salty       3/20/24   Virtual visit  Has been having significant lower extremity swelling  Reports someone took her torsemide  Blood pressure has been significantly elevated   Has been short of breath  Getting iron infusions and seen pain management  Taking clonidine p.r.n.   Compliant with all medications  Potassium level high limit of normal recently      5/22/24  Granddaughter just graduated in [place] and traveled to [place]- had headache, BP  was high - was also having SOb  Wants to travel internationally for wedding anniversary- 52 years   BP elevated  Still having SOB   Could not check weight today- the scale not working   Was getting legs wrapped 3 x week- not helping edema   Wearing compression stockings   Not taking entresto- ran out  Started on valsartan by [facility]        24  Has been having more chest pain since lat visit, worsen than normal chest pain  Cannot lay down due to SOB  Worsened SOB with exertion   Did not get fraxiga - did not receive in mail yet  Has been sweating - chronic for >1 year, gotten worse   Bp high today  Reports intermittent left arm tingling      10/15/24  virtual  Her  just  2 cancer,  was 3 days ago   Patient now staying with her daughter for a little while in [facility]   Still having chest pain, reproducible on exam, tender   Does not take Tylenol or NSAIDs   Currently on Eliquis   Blood pressure has been high      12/3/24  Virtual visit   Has been in [place] with her daughter since her  passed away in October  Complains significant lower extremity swelling, taking torsemide 40 mg daily   Is not checking blood pressures at home, but believes it has been elevated   Unsure if she is taking Farxiga  She will need to get labs while she is in [place]            EKG 24 NSR, PVCs, LVH  Ekg 24 NSR, PVCs, LAD, LVH, prolonged  ms   Ekg 23 NSR, LAFB, LVH  EKG 23 NSR, PVCs, LAD, minimal LVH  EKG 22 NSR, LAD, LVH, 93 bpm, qtc 455 ms  EKG 22 SB, incomplete RBBB, LVH, septal infarct, qtc 422 ms         Echo 22  The left ventricle is normal in size with concentric hypertrophy and normal systolic function.  The estimated ejection fraction is 60%.  Normal left ventricular diastolic function.  Normal right ventricular size with normal right ventricular systolic function.  Mild to moderate tricuspid regurgitation.  Normal central venous pressure (3 mmHg).  The estimated  PA systolic pressure is 36 mmHg.       Echo 2019  CONCLUSIONS     1 - Mild left atrial enlargement.     2 - Concentric hypertrophy.     3 - No wall motion abnormalities.     4 - Normal left ventricular systolic function (EF 60-65%).     5 - Impaired LV relaxation, elevated LAP (grade 2 diastolic dysfunction).     6 - Normal right ventricular systolic function .     7 - The estimated PA systolic pressure is 36 mmHg.     8 - Mild tricuspid regurgitation.        Past Medical History:   Diagnosis Date    Cataract     Bilateral    Chronic diastolic congestive heart failure 2020    Dizziness 2023    Encounter for blood transfusion     History of repair of aneurysm of abdominal aorta using endovascular stent graft     DR Bonds     of psychiatric care     Hypertension     Major depressive disorder, single episode, moderate with anxious distress 2020    Malignant neoplasm of upper-outer quadrant of right breast in female, estrogen receptor positive 2019    radiation    Psychiatric problem     Sleep apnea     Sleep difficulties     Stroke     no residual defect    Therapy        Past Surgical History:   Procedure Laterality Date    APPENDECTOMY      AXILLARY NODE DISSECTION Right 2020    Procedure: LYMPHADENECTOMY, AXILLARY;  Surgeon: Artemio Starks MD;  Location: [facility] OR;  Service: General;  Laterality: Right;    BIOPSY OF AXILLARY NODE Right 2021    Procedure: BIOPSY, LYMPH NODE, AXILLARY;  Surgeon: Artemio Sutton MD;  Location: [address];  Service: General;  Laterality: Right;    BREAST BIOPSY      2019    BREAST LUMPECTOMY Right     2019     SECTION      x 1    OOPHORECTOMY      right     SENTINEL LYMPH NODE BIOPSY Right 2019    Procedure: BIOPSY, LYMPH NODE, SENTINEL;  Surgeon: Artemio Starks MD;  Location: [facility] OR;  Service: General;  Laterality: Right;    splenic artery aneurysm repair      TONSILLECTOMY         Social History     Tobacco Use     Smoking status: Never     Passive exposure: Past    Smokeless tobacco: Never   Substance Use Topics    Alcohol use: No    Drug use: No       Family History   Problem Relation Name Age of Onset    Diabetes Maternal Grandmother Susu Jennings     Diabetes Maternal Grandfather N/A     Diabetes Mother Balbina Tena     Hypertension Mother Balbina Tena     Sickle cell anemia Daughter      Breast cancer Neg Hx      Colon cancer Neg Hx      Ovarian cancer Neg Hx           Review of Systems   Constitutional: Positive for fatigue. Negative for decreased appetite, diaphoresis, fever, malaise/fatigue and night sweats.   HENT:  Negative for nosebleeds.    Eyes:  Negative for blurred vision and double vision.   Cardiovascular:  Positive for chest pain, leg swelling and palpitations. Negative for claudication, irregular heartbeat, near-syncope, orthopnea, paroxysmal nocturnal dyspnea and syncope.   Respiratory:  Positive for shortness of breath. Negative for cough, sleep disturbances due to breathing, snoring, sputum production and wheezing.    Endocrine: Negative for cold intolerance and polyuria.   Hematologic/Lymphatic: Does not bruise/bleed easily.   Skin:  Negative for rash.   Musculoskeletal:  Negative for back pain, falls, joint pain, joint swelling and neck pain.        Right chest breast and shoulder pain   Gastrointestinal:  Negative for abdominal pain, heartburn, nausea and vomiting.   Genitourinary:  Negative for dysuria, frequency and hematuria.   Neurological:  Negative for difficulty with concentration, dizziness, focal weakness, headaches, light-headedness, numbness, seizures and weakness.   Psychiatric/Behavioral:  Negative for depression, memory loss and substance abuse. The patient does not have insomnia.    Allergic/Immunologic: Negative for HIV exposure and hives.     There were no vitals filed for this visit.              Objective:   Physical Exam  Constitutional:       Comments: Mild distress.    HENT:       Head: Normocephalic.   Eyes:      Pupils: Pupils are equal, round, and reactive to light.   Neck:      Thyroid: No thyromegaly.      Vascular: Normal carotid pulses. No carotid bruit or JVD.   Cardiovascular:      Rate and Rhythm: Normal rate and regular rhythm. No extrasystoles are present.     Chest Wall: PMI is not displaced.      Pulses: Normal pulses.      Heart sounds: Normal heart sounds. No murmur heard.     No gallop. No S3 sounds.   Pulmonary:      Effort: No respiratory distress.      Breath sounds: Normal breath sounds. No stridor.   Abdominal:      General: Bowel sounds are normal.      Palpations: Abdomen is soft.      Tenderness: There is no abdominal tenderness. There is no rebound.   Musculoskeletal:         General: Normal range of motion.      Comments: Tender to palpitation   Skin:     Findings: No rash.   Neurological:      Mental Status: She is alert and oriented to person, place, and time.   Psychiatric:         Behavior: Behavior normal.         Lab Results   Component Value Date    CHOL 197 01/18/2024    CHOL 234 (H) 02/28/2023    CHOL 229 (H) 04/18/2022     Lab Results   Component Value Date    HDL 50 01/18/2024    HDL 44 02/28/2023    HDL 45 04/18/2022     Lab Results   Component Value Date    LDLCALC 120.4 01/18/2024    LDLCALC 151.8 02/28/2023    LDLCALC 150.4 04/18/2022     Lab Results   Component Value Date    TRIG 133 01/18/2024    TRIG 191 (H) 02/28/2023    TRIG 168 (H) 04/18/2022     Lab Results   Component Value Date    CHOLHDL 25.4 01/18/2024    CHOLHDL 18.8 (L) 02/28/2023    CHOLHDL 19.7 (L) 04/18/2022       Chemistry        Component Value Date/Time     09/23/2024 1827    K 3.8 09/23/2024 1827     09/23/2024 1827    CO2 22 (L) 09/23/2024 1827    BUN 23 09/23/2024 1827    CREATININE 1.1 09/23/2024 1827     09/23/2024 1827        Component Value Date/Time    CALCIUM 10.3 09/23/2024 1827    ALKPHOS 94 09/23/2024 1827    AST 13 09/23/2024 1827    ALT 7 (L)  09/23/2024 1827    BILITOT 0.3 09/23/2024 1827    ESTGFRAFRICA 37 (A) 07/06/2022 1312    EGFRNONAA 32 (A) 07/06/2022 1312          Lab Results   Component Value Date    HGBA1C 5.7 07/26/2024     Lab Results   Component Value Date    TSH 1.269 06/25/2024     Lab Results   Component Value Date    INR 1.1 03/11/2023    INR 0.9 02/15/2022    INR 1.0 11/10/2020     Lab Results   Component Value Date    WBC 11.37 09/23/2024    HGB 13.2 09/23/2024    HCT 41.4 09/23/2024    MCV 81 (L) 09/23/2024     09/23/2024     BMP  Sodium   Date Value Ref Range Status   09/23/2024 142 136 - 145 mmol/L Final     Potassium   Date Value Ref Range Status   09/23/2024 3.8 3.5 - 5.1 mmol/L Final     Chloride   Date Value Ref Range Status   09/23/2024 108 95 - 110 mmol/L Final     CO2   Date Value Ref Range Status   09/23/2024 22 (L) 23 - 29 mmol/L Final     BUN   Date Value Ref Range Status   09/23/2024 23 8 - 23 mg/dL Final     Creatinine   Date Value Ref Range Status   09/23/2024 1.1 0.5 - 1.4 mg/dL Final     Calcium   Date Value Ref Range Status   09/23/2024 10.3 8.7 - 10.5 mg/dL Final     Anion Gap   Date Value Ref Range Status   09/23/2024 12 8 - 16 mmol/L Final     eGFR if    Date Value Ref Range Status   07/06/2022 37 (A) >60 mL/min/1.73 m^2 Final     eGFR if non    Date Value Ref Range Status   07/06/2022 32 (A) >60 mL/min/1.73 m^2 Final     Comment:     Calculation used to obtain the estimated glomerular filtration  rate (eGFR) is the CKD-EPI equation.        BNP  @LABRCNTIP(BNP,BNPTRIAGEBLO)@  @LABRCNTIP(troponini)@  CrCl cannot be calculated (Patient's most recent lab result is older than the maximum 7 days allowed.).  No results found in the last 24 hours.  No results found in the last 24 hours.  No results found in the last 24 hours.    Assessment:      1. Hypertension, unspecified type    2. Chronic diastolic congestive heart failure    3. NILS (obstructive sleep apnea)    4. Class 3 severe  obesity with serious comorbidity and body mass index (BMI) of 40.0 to 44.9 in adult, unspecified obesity type    5. Elevated plasma metanephrines    6. Malignant neoplasm of upper-outer quadrant of right breast in female, estrogen receptor positive    7. History of pulmonary embolism    8. Major depressive disorder, recurrent episode, moderate with anxious distress    9. Neuropathic pain    10. Chronic kidney disease, stage 3a    11. Pain of lower extremity, unspecified laterality    12. Abdominal aortic aneurysm (AAA) without rupture, unspecified part    13. History of CVA (cerebrovascular accident)    14. Right sided weakness    15. SOB (shortness of breath)    16. Hyperlipidemia, unspecified hyperlipidemia type    17. Recurrent pulmonary emboli              Plan:     Left leg swelling/lymphedema- chronic  Increase torsemide 40 mg to BID  Continue farxiga  Holding metolazone for now  Venous ultrasound 10/22 and 9/23 without DVT  Was seeing lymphedema clinic    Diastolic heart failure  Echo 3/23 with normal EF, mild-mod TR    Hypertension  Elevated  Reduce coreg back to 6.25 mg b.i.d- higher dose was causing fatigue  Add clonidine 0.1 mg nightly (can still take Clonidine prn for BP >170 or >110)  Check blood pressures at home   Continue hydralazine 100 mg t.i.d., valsartan 160 mg bid  Renal artery U/S 3/23, no stenosis   Metanephrines high plasma, urine negative     Chest pain/shortness of breath  CTA 3/23 did not show PE   Stress test 1/23 normal stress test   Chest pain reproducible- not cardiac in nature  Try tylenol prn    Syncope- resolved   14 day monitor 8/22- 7.27% PACs, essentially asymptomatic    History of right breast cancer  Follows with Hematology-Oncology    Right leg pain-stable  DVT ultrasound 10/22 without evidence of DVT   Normal ABIs 3/22  Arterial ultrasound 8/22 without significant stenosis    Recurrent pulmonary embolus   Recurrent PE as of 2/22  Continue OAC    History of CVA  Carotid  ultrasound 6/22 no significant stenosis, MRI brain 6/22 no abnormality  Currently not on aspirin as she is taking Eliquis  Continue statin    HLD  ->120 as of 1/24  Continue statin    AAA s/p transcutaneous patch  Stable    Morbid obesity, BMI > 40  Low-salt, low-fat diet  Exercise as tolerated      All labs and cardiac procedures reviewed.      Return to clinic 2 weeks - ailyn Hobbs MD  Cardiology Staff

## 2024-12-05 ENCOUNTER — PATIENT MESSAGE (OUTPATIENT)
Dept: INTERNAL MEDICINE | Facility: CLINIC | Age: 74
End: 2024-12-05
Payer: MEDICARE

## 2024-12-05 DIAGNOSIS — Z17.0 MALIGNANT NEOPLASM OF UPPER-OUTER QUADRANT OF RIGHT BREAST IN FEMALE, ESTROGEN RECEPTOR POSITIVE: ICD-10-CM

## 2024-12-05 DIAGNOSIS — I26.99 ACUTE PULMONARY EMBOLISM, UNSPECIFIED PULMONARY EMBOLISM TYPE, UNSPECIFIED WHETHER ACUTE COR PULMONALE PRESENT: ICD-10-CM

## 2024-12-05 DIAGNOSIS — C50.411 MALIGNANT NEOPLASM OF UPPER-OUTER QUADRANT OF RIGHT BREAST IN FEMALE, ESTROGEN RECEPTOR POSITIVE: ICD-10-CM

## 2024-12-07 RX ORDER — LETROZOLE 2.5 MG/1
2.5 TABLET, FILM COATED ORAL DAILY
Qty: 90 TABLET | Refills: 3 | Status: SHIPPED | OUTPATIENT
Start: 2024-12-07

## 2025-01-03 DIAGNOSIS — J30.2 SEASONAL ALLERGIES: ICD-10-CM

## 2025-01-03 DIAGNOSIS — M54.2 CERVICALGIA: ICD-10-CM

## 2025-01-03 DIAGNOSIS — M79.605 BILATERAL LEG PAIN: ICD-10-CM

## 2025-01-03 DIAGNOSIS — G89.4 CHRONIC PAIN DISORDER: ICD-10-CM

## 2025-01-03 DIAGNOSIS — M79.604 PAIN IN BOTH LOWER EXTREMITIES: ICD-10-CM

## 2025-01-03 DIAGNOSIS — M79.605 PAIN IN BOTH LOWER EXTREMITIES: ICD-10-CM

## 2025-01-03 DIAGNOSIS — M50.30 DDD (DEGENERATIVE DISC DISEASE), CERVICAL: ICD-10-CM

## 2025-01-03 DIAGNOSIS — M79.604 BILATERAL LEG PAIN: ICD-10-CM

## 2025-01-03 DIAGNOSIS — F54 PSYCHOLOGICAL FACTORS AFFECTING MEDICAL CONDITION: ICD-10-CM

## 2025-01-03 DIAGNOSIS — I10 PRIMARY HYPERTENSION: ICD-10-CM

## 2025-01-03 DIAGNOSIS — I26.99 ACUTE PULMONARY EMBOLISM, UNSPECIFIED PULMONARY EMBOLISM TYPE, UNSPECIFIED WHETHER ACUTE COR PULMONALE PRESENT: ICD-10-CM

## 2025-01-03 RX ORDER — DICLOFENAC SODIUM 10 MG/G
2 GEL TOPICAL DAILY
Qty: 400 G | Refills: 0 | Status: SHIPPED | OUTPATIENT
Start: 2025-01-03

## 2025-01-03 RX ORDER — ALPRAZOLAM 0.5 MG/1
0.5 TABLET ORAL 2 TIMES DAILY PRN
Qty: 60 TABLET | Refills: 0 | OUTPATIENT
Start: 2025-01-03 | End: 2025-02-02

## 2025-01-03 RX ORDER — LEVOCETIRIZINE DIHYDROCHLORIDE 5 MG/1
5 TABLET, FILM COATED ORAL NIGHTLY
Qty: 90 TABLET | Refills: 3 | Status: SHIPPED | OUTPATIENT
Start: 2025-01-03

## 2025-01-03 RX ORDER — CLONIDINE HYDROCHLORIDE 0.1 MG/1
0.2 TABLET ORAL 2 TIMES DAILY PRN
Qty: 180 TABLET | Refills: 0 | Status: SHIPPED | OUTPATIENT
Start: 2025-01-03 | End: 2025-04-03

## 2025-01-03 NOTE — TELEPHONE ENCOUNTER
Can you refill?topiramate (TOPAMAX) 25 MG tablet     Last Visit:11/22/2024  Next Visit:n/a  Last refill:11/22/2024  How pt patient is currently taking medication requested:   Pharmacy:   [facility] [address]       Can you refill?gabapentin (NEURONTIN) 300 MG capsule     Last Visit:11/22/2024  Next Visit:n/a  Last refill:11/22/2024  How pt patient is currently taking medication requested:   Pharmacy:   [facility] [address]

## 2025-01-03 NOTE — TELEPHONE ENCOUNTER
Called patient and patient informed me that she will no longer be using [facility] for her medication wanted everything switched over to [facility]. I informed patient that Ms. Vincent was out today and I would be sending it over to another provider. Patient verbalized understanding.    Lillian kimball

## 2025-01-06 ENCOUNTER — PATIENT MESSAGE (OUTPATIENT)
Dept: CARDIOLOGY | Facility: CLINIC | Age: 75
End: 2025-01-06
Payer: MEDICARE

## 2025-01-06 RX ORDER — GABAPENTIN 300 MG/1
300 CAPSULE ORAL 3 TIMES DAILY
Qty: 90 CAPSULE | Refills: 11 | Status: SHIPPED | OUTPATIENT
Start: 2025-01-06 | End: 2026-01-06

## 2025-01-06 RX ORDER — TOPIRAMATE 25 MG/1
25 TABLET ORAL 2 TIMES DAILY
Qty: 60 TABLET | Refills: 11 | Status: SHIPPED | OUTPATIENT
Start: 2025-01-06 | End: 2026-01-06

## 2025-01-13 ENCOUNTER — PATIENT MESSAGE (OUTPATIENT)
Dept: CARDIOLOGY | Facility: CLINIC | Age: 75
End: 2025-01-13
Payer: MEDICARE

## 2025-01-22 ENCOUNTER — OFFICE VISIT (OUTPATIENT)
Dept: CARDIOLOGY | Facility: CLINIC | Age: 75
End: 2025-01-22
Payer: MEDICARE

## 2025-01-22 DIAGNOSIS — R06.02 SOB (SHORTNESS OF BREATH): ICD-10-CM

## 2025-01-22 DIAGNOSIS — R60.9 EDEMA, UNSPECIFIED TYPE: ICD-10-CM

## 2025-01-22 DIAGNOSIS — I50.32 CHRONIC DIASTOLIC CONGESTIVE HEART FAILURE: ICD-10-CM

## 2025-01-22 DIAGNOSIS — I71.40 ABDOMINAL AORTIC ANEURYSM (AAA) WITHOUT RUPTURE, UNSPECIFIED PART: ICD-10-CM

## 2025-01-22 DIAGNOSIS — E78.5 HYPERLIPIDEMIA, UNSPECIFIED HYPERLIPIDEMIA TYPE: ICD-10-CM

## 2025-01-22 DIAGNOSIS — Z86.73 HISTORY OF CVA (CEREBROVASCULAR ACCIDENT): ICD-10-CM

## 2025-01-22 DIAGNOSIS — I10 HYPERTENSION, UNSPECIFIED TYPE: ICD-10-CM

## 2025-01-22 DIAGNOSIS — N18.31 CHRONIC KIDNEY DISEASE, STAGE 3A: ICD-10-CM

## 2025-01-22 DIAGNOSIS — I71.41 PARARENAL ABDOMINAL AORTIC ANEURYSM (AAA) WITHOUT RUPTURE: ICD-10-CM

## 2025-01-22 DIAGNOSIS — Z86.711 HISTORY OF PULMONARY EMBOLISM: ICD-10-CM

## 2025-01-22 DIAGNOSIS — R23.2 HOT FLASHES: ICD-10-CM

## 2025-01-22 DIAGNOSIS — M79.2 NEUROPATHIC PAIN: ICD-10-CM

## 2025-01-22 DIAGNOSIS — R07.9 CHEST PAIN, UNSPECIFIED TYPE: ICD-10-CM

## 2025-01-22 DIAGNOSIS — I26.99 RECURRENT PULMONARY EMBOLI: ICD-10-CM

## 2025-01-22 DIAGNOSIS — I26.99 ACUTE PULMONARY EMBOLISM, UNSPECIFIED PULMONARY EMBOLISM TYPE, UNSPECIFIED WHETHER ACUTE COR PULMONALE PRESENT: ICD-10-CM

## 2025-01-22 DIAGNOSIS — M79.606 PAIN OF LOWER EXTREMITY, UNSPECIFIED LATERALITY: ICD-10-CM

## 2025-01-22 DIAGNOSIS — E66.813 CLASS 3 SEVERE OBESITY WITH SERIOUS COMORBIDITY AND BODY MASS INDEX (BMI) OF 40.0 TO 44.9 IN ADULT, UNSPECIFIED OBESITY TYPE: ICD-10-CM

## 2025-01-22 DIAGNOSIS — G47.33 OSA (OBSTRUCTIVE SLEEP APNEA): Primary | ICD-10-CM

## 2025-01-22 DIAGNOSIS — E66.01 CLASS 3 SEVERE OBESITY WITH SERIOUS COMORBIDITY AND BODY MASS INDEX (BMI) OF 40.0 TO 44.9 IN ADULT, UNSPECIFIED OBESITY TYPE: ICD-10-CM

## 2025-01-22 DIAGNOSIS — E66.01 MORBID OBESITY WITH BMI OF 45.0-49.9, ADULT: ICD-10-CM

## 2025-01-22 DIAGNOSIS — I10 PRIMARY HYPERTENSION: ICD-10-CM

## 2025-01-22 RX ORDER — ATORVASTATIN CALCIUM 20 MG/1
20 TABLET, FILM COATED ORAL NIGHTLY
Qty: 90 TABLET | Refills: 3 | Status: SHIPPED | OUTPATIENT
Start: 2025-01-22

## 2025-01-22 RX ORDER — HYDRALAZINE HYDROCHLORIDE 100 MG/1
100 TABLET, FILM COATED ORAL 3 TIMES DAILY
Qty: 270 TABLET | Refills: 3 | Status: SHIPPED | OUTPATIENT
Start: 2025-01-22

## 2025-01-22 RX ORDER — RANOLAZINE 1000 MG/1
1000 TABLET, EXTENDED RELEASE ORAL 2 TIMES DAILY
Qty: 180 TABLET | Refills: 3 | Status: SHIPPED | OUTPATIENT
Start: 2025-01-22 | End: 2026-01-22

## 2025-01-22 RX ORDER — CLONIDINE HYDROCHLORIDE 0.1 MG/1
TABLET ORAL
Qty: 90 TABLET | Refills: 3 | Status: SHIPPED | OUTPATIENT
Start: 2025-01-22

## 2025-01-22 RX ORDER — CARVEDILOL 12.5 MG/1
12.5 TABLET ORAL 2 TIMES DAILY WITH MEALS
Qty: 180 TABLET | Refills: 3 | Status: SHIPPED | OUTPATIENT
Start: 2025-01-22 | End: 2026-01-22

## 2025-01-22 NOTE — PROGRESS NOTES
Subjective:   Patient ID:  Susu Luther is a 75 y.o. female who presents for follow up of No chief complaint on file.      The patient location is: home  The chief complaint leading to consultation is:  Lower extremity swelling    Visit type: audiovisual     Face to Face time with patient: 30 min  30 minutes of total time spent on the encounter, which includes face to face time and non-face to face time preparing to see the patient (eg, review of tests), Obtaining and/or reviewing separately obtained history, Documenting clinical information in the electronic or other health record, Independently interpreting results (not separately reported) and communicating results to the patient/family/caregiver, or Care coordination (not separately reported).         Each patient to whom he or she provides medical services by telemedicine is:  (1) informed of the relationship between the physician and patient and the respective role of any other health care provider with respect to management of the patient; and (2) notified that he or she may decline to receive medical services by telemedicine and may withdraw from such care at any time.    Notes:      12/1/20  69 yo female, care establish. Prior cardiologist Dr ackerman   [facility] HTN, CVA (2009), Right breast CA, Lumpectomy 3/2019, h/o PE off OAC 2 yrs ago.  AAA, s/p transcutaneous patch by Dr. Bonds, obesity knee OA imbalanced walker dependent  C/o SOB after walking few steps and dizziness. Had vision issue 1 month ago due to uncontrolled HTN  No chest pain  Sleeps with 2 pillows  Decent appetites  Leg calf pain worse at night  No smoking/drinking  ekg today NSR LVH.    ECH normal EF, grade II DD, LAE and PAP 56 mmHG   Chest CTA negative for PE  BP high    A1c controlled    S/p Open subpectoral lymph node biopsy on the right on 12/02/2020 by Dr. Starks  Still right chest, under arm and shoudler supersensitive pain      Shortness of Breath  Associated  symptoms include chest pain and leg swelling. Pertinent negatives include no abdominal pain, claudication, fever, headaches, neck pain, orthopnea, PND, rash, sputum production, syncope, vomiting or wheezing.   Chest Pain   Associated symptoms include palpitations and shortness of breath. Pertinent negatives include no abdominal pain, back pain, claudication, cough, diaphoresis, dizziness, fever, headaches, irregular heartbeat, malaise/fatigue, nausea, near-syncope, numbness, orthopnea, PND, sputum production, syncope, vomiting or weakness.   Her past medical history is significant for CHF.   Pertinent negatives for past medical history include no seizures.   Congestive Heart Failure  Associated symptoms include chest pain, fatigue, palpitations and shortness of breath. Pertinent negatives include no abdominal pain, claudication or near-syncope.   Palpitations   Associated symptoms include chest pain and shortness of breath. Pertinent negatives include no coughing, diaphoresis, dizziness, fever, irregular heartbeat, malaise/fatigue, nausea, near-syncope, numbness, syncope, vomiting or weakness.   Fatigue  Associated symptoms include chest pain and fatigue. Pertinent negatives include no abdominal pain, coughing, diaphoresis, fever, headaches, joint swelling, nausea, neck pain, numbness, rash, vomiting or weakness.     2/28/22  Patient was admitted to [facility] for shortness of breath.  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  Started on heparin drip in transition to oral anticoagulation, now on Eliquis.  Recurrent PE.  On Femara. Has another lump in breast on right side, recently seen on PET scan.   Due to TANNER, was told to stop hctz, telmisartan.  She has ran out of clonidine. Does not take the ASA, hydralazine, amlodipine.   Blood pressure normotensive.  Reports severe headaches.  Has been out of Fioricet.  Echocardiogram with normal EF  Reports shortness of  breath, chest heaviness mostly right-sided.      3/14/22  Still having SOB due to PE.  No bleeding issues while on eliquis.  Chest pain is substernal to right sided.   Lasix doesn't seem to help.   A reports intermittent leg pain right > left.  DVT ultrasound in-hospital with no evidence of DVT.  Following Hematology-Oncology.  Patient does not want surgery if needed for possible breast cancer.  Denies syncope, fever, chills.         4/18/22  Comes in with daughter who lives in [place].  Concerned that she may oxygen issues and may need home oxygen. O2 98%  Reports LE swelling and SOB  Reports chest pain comes on with SOB, did not have chest pain prior to PE  Has not been on lasix, has been held  Reports balancing issues- can do PT once symptoms improve   Denies syncope, fever, chills.       5/16/22  Has been feeling weak last couple days  Feels chest tightness with walking, also CURIEL- feels worse than before. 97% O2 in clinic   Reports dizziness after taking medications   BP low today   Says recurrent cancer may be spreading   No bleeding on eliquis   Reports orthopnea, PND.  Not feeling well- Does not want to the hospital     Denies syncope.      6/13/22  Not feeling well today  Reports chest pain and SOB worsening over the last 2 weeks   Ddimer trending, downTroponin neg and BNP nl on 5/16/22  EKG today without significant abnormalities   Reports recent diagnosis of breast cancer is progressing  Reports right-sided arm weakness  Patient has been increasingly stressed    Denies syncope, lower extremity swelling.      7/6/2022   went to emergency room 6/13/2022, was worked up for stroke  MRI brain negative  Repeat CTA chest without PE, right-sided mass 6 cm, stable  All blood pressure medications.  Due to hypotension  Started taking Lasix today  Has significant lower extremity pain bilateral, reports blue feet at times      8/9/22  Virtual visit  Ambulates with walker   Had a fall recently due to syncopal episode,  went to urgent care, negative workup per patient  Syncopal event occurred while standing up  Last visit Lasix was increased, patient reports increased thirst and water intake  Has also been taking carvedilol, reports low blood pressure  Chest pain worsened after syncopal event, has bruising on her body      9/7/22  Reports leg swelling, left  Has been taking eliquis also   Restarted taking lasix 2 weeks ago  Recently started on lyrica   Still wearing cardiac monitor  Still having SOB and chest discomfort  U/S arterial w/o significant stenosis   BP elevated, high at home  Lost 8 lbs since 9/1      10/12/22  Virtual visit  Doing well, still getting chest pain   Still has shortness of breath but has improved   Was able to go to a football game without any issues   DVT ultrasound without thrombus   Has been treated for gout, improvement of toe pain  Has been having intermittent blood pressure elevated readings at home        2/20/23  Stress test normal   BP elevated now  Has been more tired  Drinking lots of water  Chest pain has improved with imdur  SOB stable   Has chronic pain and chronic fatigue         3/21/23  Patient was recently admitted to the hospital for right arm weakness, TANNER, chest pain, shortness of breath   MRI/CT scan of the brain were unremarkable   CTA did not show PE   Had elevated creatinine, renal ultrasound did not show arterial stenosis  BP noted to be significantly elevated   Blood pressure meds were changed, patient only taking Entresto and amlodipine   Coreg was stopped  Reports right-sided breast lump/mass currently being investigated    Today, reports still having shortness of breath  Waiting to have ultrasound done for right breast lump  Blood pressure stable today      4/25/23  Virtual visit   Continues to have intermittent chest pain, shortness of breath   Has not fallen since last visit   Blood pressure has been stable   Metanephrine levels are elevated  No bleeding on  Eliquis      9/18/23  Virtual visit   Was admitted to [facility] 7/23 for chest pain and recurrent falls and worsening swelling in her legs  Lasix was switched to torsemide  Continues to have leg pain and swelling  Has been seeing Maria Isabel in [facility]  Currently taking torsemide 20 mg b.i.d.  No improvement in her leg pain and leg swelling since discharge from the hospital   Recent DVT ultrasound of right leg with no clot  Has been wearing compression stockings but difficult recently due to leg swelling      9/25/23  Virtual visit   Took metolazone once  Taking torsemide 40 mg b.i.d.  Swelling improving per patient but not a lot pain   CKD mildly worsening on recent labs        9/29/23  Virtual visit  Worsening renal function with taking increased dose of torsemide and metolazone x 2 doses   Swelling has improved and patient now able to walk w/o pain  Was told to hold diuretics for 1 week and have f/u labs  No SOB  Has not gotten contacted by lymphedema clinic at [facility]      10/25/23  Swelling has improved in legs, still having pain with ambulation   Insurance did not cover lymphedema clinic at [facility] or [facility]  Has shortness of breath intermittent      1/10/24  Going to lymphedema clinic in [facility] improving, now able to walk on them  Fell x2 1 month ago   Stopped amlodipine  Fluctuations in BP, can be high at times  Wearing compression stockings  BP stable   Says gabapentin may be causing weight gain  Weight going up significantly   SOB stable   Has been eating out- has been salty       3/20/24   Virtual visit  Has been having significant lower extremity swelling  Reports someone took her torsemide  Blood pressure has been significantly elevated   Has been short of breath  Getting iron infusions and seen pain management  Taking clonidine p.r.n.   Compliant with all medications  Potassium level high limit of normal recently      5/22/24  Granddaughter just graduated in [place] and traveled to [place]- had headache, BP  was high - was also having SOb  Wants to travel internationally for wedding anniversary- 52 years   BP elevated  Still having SOB   Could not check weight today- the scale not working   Was getting legs wrapped 3 x week- not helping edema   Wearing compression stockings   Not taking entresto- ran out  Started on valsartan by [facility]        24  Has been having more chest pain since lat visit, worsen than normal chest pain  Cannot lay down due to SOB  Worsened SOB with exertion   Did not get fraxiga - did not receive in mail yet  Has been sweating - chronic for >1 year, gotten worse   Bp high today  Reports intermittent left arm tingling      10/15/24  virtual  Her  just  2 cancer,  was 3 days ago   Patient now staying with her daughter for a little while in [facility]   Still having chest pain, reproducible on exam, tender   Does not take Tylenol or NSAIDs   Currently on Eliquis   Blood pressure has been high      12/3/24  Virtual visit   Has been in [place] with her daughter since her  passed away in October  Complains significant lower extremity swelling, taking torsemide 40 mg daily   Is not checking blood pressures at home, but believes it has been elevated   Unsure if she is taking Farxiga  She will need to get labs while she is in [place]        25  Virtual visit  Patient is still in [place] with her daughter and will be seeing doctors out there   Says blood pressure has been high   Has seen a PCP where she is staying  Reports lower extremity swelling, taking torsemide 40 mg b.i.d.  Gets occasional chest pain  Has been compliant with all her meds  Had the flu 2 weeks ago and still trying to recover  Complains of insomnia since her  passed       EKG 24 NSR, PVCs, LVH  Ekg 24 NSR, PVCs, LAD, LVH, prolonged  ms   Ekg 23 NSR, LAFB, LVH  EKG 23 NSR, PVCs, LAD, minimal LVH  EKG 22 NSR, LAD, LVH, 93 bpm, qtc 455 ms  EKG 22 SB, incomplete RBBB,  LVH, septal infarct, qtc 422 ms         Echo 2/28/22  The left ventricle is normal in size with concentric hypertrophy and normal systolic function.  The estimated ejection fraction is 60%.  Normal left ventricular diastolic function.  Normal right ventricular size with normal right ventricular systolic function.  Mild to moderate tricuspid regurgitation.  Normal central venous pressure (3 mmHg).  The estimated PA systolic pressure is 36 mmHg.       Echo 2019  CONCLUSIONS     1 - Mild left atrial enlargement.     2 - Concentric hypertrophy.     3 - No wall motion abnormalities.     4 - Normal left ventricular systolic function (EF 60-65%).     5 - Impaired LV relaxation, elevated LAP (grade 2 diastolic dysfunction).     6 - Normal right ventricular systolic function .     7 - The estimated PA systolic pressure is 36 mmHg.     8 - Mild tricuspid regurgitation.        Past Medical History:   Diagnosis Date    Cataract     Bilateral    Chronic diastolic congestive heart failure 08/25/2020    Dizziness 03/12/2023    Encounter for blood transfusion     History of repair of aneurysm of abdominal aorta using endovascular stent graft     DR Bonds     of psychiatric care     Hypertension     Major depressive disorder, single episode, moderate with anxious distress 01/14/2020    Malignant neoplasm of upper-outer quadrant of right breast in female, estrogen receptor positive 03/14/2019    radiation    Psychiatric problem     Sleep apnea     Sleep difficulties     Stroke 2009    no residual defect    Therapy        Past Surgical History:   Procedure Laterality Date    APPENDECTOMY      AXILLARY NODE DISSECTION Right 12/02/2020    Procedure: LYMPHADENECTOMY, AXILLARY;  Surgeon: Artemio Starks MD;  Location: [facility] OR;  Service: General;  Laterality: Right;    BIOPSY OF AXILLARY NODE Right 03/02/2021    Procedure: BIOPSY, LYMPH NODE, AXILLARY;  Surgeon: Artemio Sutton MD;  Location: [facility] OR [address] [place] [address];  Service: General;   Laterality: Right;    BREAST BIOPSY      2019    BREAST LUMPECTOMY Right     2019     SECTION      x 1    OOPHORECTOMY      right     SENTINEL LYMPH NODE BIOPSY Right 2019    Procedure: BIOPSY, LYMPH NODE, SENTINEL;  Surgeon: Artemio Starks MD;  Location: [facility] OR;  Service: General;  Laterality: Right;    splenic artery aneurysm repair      TONSILLECTOMY         Social History     Tobacco Use    Smoking status: Never     Passive exposure: Past    Smokeless tobacco: Never   Substance Use Topics    Alcohol use: No    Drug use: No       Family History   Problem Relation Name Age of Onset    Diabetes Maternal Grandmother Susu Jennings     Diabetes Maternal Grandfather N/A     Diabetes Mother Balbina Tena     Hypertension Mother Balbina Tena     Sickle cell anemia Daughter      Breast cancer Neg Hx      Colon cancer Neg Hx      Ovarian cancer Neg Hx           Review of Systems   Constitutional: Positive for fatigue. Negative for decreased appetite, diaphoresis, fever, malaise/fatigue and night sweats.   HENT:  Negative for nosebleeds.    Eyes:  Negative for blurred vision and double vision.   Cardiovascular:  Positive for chest pain, leg swelling and palpitations. Negative for claudication, irregular heartbeat, near-syncope, orthopnea, paroxysmal nocturnal dyspnea and syncope.   Respiratory:  Positive for shortness of breath. Negative for cough, sleep disturbances due to breathing, snoring, sputum production and wheezing.    Endocrine: Negative for cold intolerance and polyuria.   Hematologic/Lymphatic: Does not bruise/bleed easily.   Skin:  Negative for rash.   Musculoskeletal:  Negative for back pain, falls, joint pain, joint swelling and neck pain.        Right chest breast and shoulder pain   Gastrointestinal:  Negative for abdominal pain, heartburn, nausea and vomiting.   Genitourinary:  Negative for dysuria, frequency and hematuria.   Neurological:  Negative for difficulty with concentration,  dizziness, focal weakness, headaches, light-headedness, numbness, seizures and weakness.   Psychiatric/Behavioral:  Negative for depression, memory loss and substance abuse. The patient does not have insomnia.    Allergic/Immunologic: Negative for HIV exposure and hives.     There were no vitals filed for this visit.              Objective:   Physical Exam  Constitutional:       Comments: Mild distress.    HENT:      Head: Normocephalic.   Eyes:      Pupils: Pupils are equal, round, and reactive to light.   Neck:      Thyroid: No thyromegaly.      Vascular: Normal carotid pulses. No carotid bruit or JVD.   Cardiovascular:      Rate and Rhythm: Normal rate and regular rhythm. No extrasystoles are present.     Chest Wall: PMI is not displaced.      Pulses: Normal pulses.      Heart sounds: Normal heart sounds. No murmur heard.     No gallop. No S3 sounds.   Pulmonary:      Effort: No respiratory distress.      Breath sounds: Normal breath sounds. No stridor.   Abdominal:      General: Bowel sounds are normal.      Palpations: Abdomen is soft.      Tenderness: There is no abdominal tenderness. There is no rebound.   Musculoskeletal:         General: Normal range of motion.      Comments: Tender to palpitation   Skin:     Findings: No rash.   Neurological:      Mental Status: She is alert and oriented to person, place, and time.   Psychiatric:         Behavior: Behavior normal.         Lab Results   Component Value Date    CHOL 197 01/18/2024    CHOL 234 (H) 02/28/2023    CHOL 229 (H) 04/18/2022     Lab Results   Component Value Date    HDL 50 01/18/2024    HDL 44 02/28/2023    HDL 45 04/18/2022     Lab Results   Component Value Date    LDLCALC 120.4 01/18/2024    LDLCALC 151.8 02/28/2023    LDLCALC 150.4 04/18/2022     Lab Results   Component Value Date    TRIG 133 01/18/2024    TRIG 191 (H) 02/28/2023    TRIG 168 (H) 04/18/2022     Lab Results   Component Value Date    CHOLHDL 25.4 01/18/2024    CHOLHDL 18.8 (L)  02/28/2023    CHOLHDL 19.7 (L) 04/18/2022       Chemistry        Component Value Date/Time     09/23/2024 1827    K 3.8 09/23/2024 1827     09/23/2024 1827    CO2 22 (L) 09/23/2024 1827    BUN 23 09/23/2024 1827    CREATININE 1.1 09/23/2024 1827     09/23/2024 1827        Component Value Date/Time    CALCIUM 10.3 09/23/2024 1827    ALKPHOS 94 09/23/2024 1827    AST 13 09/23/2024 1827    ALT 7 (L) 09/23/2024 1827    BILITOT 0.3 09/23/2024 1827    ESTGFRAFRICA 37 (A) 07/06/2022 1312    EGFRNONAA 32 (A) 07/06/2022 1312          Lab Results   Component Value Date    HGBA1C 5.7 07/26/2024     Lab Results   Component Value Date    TSH 1.269 06/25/2024     Lab Results   Component Value Date    INR 1.1 03/11/2023    INR 0.9 02/15/2022    INR 1.0 11/10/2020     Lab Results   Component Value Date    WBC 11.37 09/23/2024    HGB 13.2 09/23/2024    HCT 41.4 09/23/2024    MCV 81 (L) 09/23/2024     09/23/2024     BMP  Sodium   Date Value Ref Range Status   09/23/2024 142 136 - 145 mmol/L Final     Potassium   Date Value Ref Range Status   09/23/2024 3.8 3.5 - 5.1 mmol/L Final     Chloride   Date Value Ref Range Status   09/23/2024 108 95 - 110 mmol/L Final     CO2   Date Value Ref Range Status   09/23/2024 22 (L) 23 - 29 mmol/L Final     BUN   Date Value Ref Range Status   09/23/2024 23 8 - 23 mg/dL Final     Creatinine   Date Value Ref Range Status   09/23/2024 1.1 0.5 - 1.4 mg/dL Final     Calcium   Date Value Ref Range Status   09/23/2024 10.3 8.7 - 10.5 mg/dL Final     Anion Gap   Date Value Ref Range Status   09/23/2024 12 8 - 16 mmol/L Final     eGFR if    Date Value Ref Range Status   07/06/2022 37 (A) >60 mL/min/1.73 m^2 Final     eGFR if non    Date Value Ref Range Status   07/06/2022 32 (A) >60 mL/min/1.73 m^2 Final     Comment:     Calculation used to obtain the estimated glomerular filtration  rate (eGFR) is the CKD-EPI equation.         BNP  @LABRCNTIP(BNP,BNPTRIAGEBLO)@  @LABRCNTIP(troponini)@  CrCl cannot be calculated (Patient's most recent lab result is older than the maximum 7 days allowed.).  No results found in the last 24 hours.  No results found in the last 24 hours.  No results found in the last 24 hours.    Assessment:      1. NILS (obstructive sleep apnea)    2. Class 3 severe obesity with serious comorbidity and body mass index (BMI) of 40.0 to 44.9 in adult, unspecified obesity type    3. Chronic diastolic congestive heart failure    4. History of pulmonary embolism    5. Neuropathic pain    6. Chronic kidney disease, stage 3a    7. Pain of lower extremity, unspecified laterality    8. Abdominal aortic aneurysm (AAA) without rupture, unspecified part    9. History of CVA (cerebrovascular accident)    10. Recurrent pulmonary emboli    11. Pararenal abdominal aortic aneurysm (AAA) without rupture    12. Edema, unspecified type    13. Chest pain, unspecified type    14. Morbid obesity with BMI of 45.0-49.9, adult    15. SOB (shortness of breath)          Plan:     Left leg swelling/lymphedema- chronic  Continue torsemide 40 mg BID  Continue farxiga  Holding metolazone for now  Venous ultrasound 10/22 and 9/23 without DVT  Was seeing lymphedema clinic    Diastolic heart failure  Echo 3/23 with normal EF, mild-mod TR    Hypertension  Elevated  Continue Coreg 12.5 mg b.i.d.  Taking clonidine p.r.n. (prn for BP >170 or >110)- need to check blood pressures at home regularly  Needs to start taking clonidine 0.1 mg at night  Continue hydralazine 100 mg t.i.d., valsartan 160 mg bid  Renal artery U/S 3/23, no stenosis   Metanephrines high plasma, urine negative     Chest pain/shortness of breath  CTA 3/23 did not show PE   Stress test 1/23 normal stress test   Chest pain reproducible- not cardiac in nature  Try tylenol prn  Takes ranolazine b.i.d.    Syncope- resolved   14 day monitor 8/22- 7.27% PACs, essentially asymptomatic    History of  right breast cancer  Follows with Hematology-Oncology    Right leg pain-stable  DVT ultrasound 10/22 without evidence of DVT   Normal ABIs 3/22  Arterial ultrasound 8/22 without significant stenosis    Recurrent pulmonary embolus   Recurrent PE as of 2/22  Continue OAC    History of CVA  Carotid ultrasound 6/22 no significant stenosis, MRI brain 6/22 no abnormality  Currently not on aspirin as she is taking Eliquis  Continue statin    HLD  ->120 as of 1/24  Continue statin    AAA s/p transcutaneous patch  Stable    Morbid obesity, BMI > 40  Low-salt, low-fat diet  Exercise as tolerated      All labs and cardiac procedures reviewed.    Will be seen doctors in [facility]    Arlene Hobbs MD  Cardiology Staff

## 2025-01-27 NOTE — TELEPHONE ENCOUNTER
Has not been on lexapro for the past year due to lapse in insurance  Agreeable to resume on dc   lov 12/16/21

## 2025-01-29 ENCOUNTER — PATIENT MESSAGE (OUTPATIENT)
Dept: ADMINISTRATIVE | Facility: CLINIC | Age: 75
End: 2025-01-29
Payer: MEDICARE

## 2025-01-30 ENCOUNTER — TELEPHONE (OUTPATIENT)
Dept: INTERNAL MEDICINE | Facility: CLINIC | Age: 75
End: 2025-01-30
Payer: MEDICARE

## 2025-01-30 NOTE — TELEPHONE ENCOUNTER
Mrs. Luther is currently living with her daughter in [place]. I am unsure if she plans to return to [place] for healthcare.

## 2025-01-30 NOTE — TELEPHONE ENCOUNTER
I tried contacting the pt regarding labs ordered in December for her by Dr.Jessica Littlejohn and there was no answer so, I lef there a vm asking that she contact so we can ge there in for a visit and lab collection . She was last seen in November of 2024 please advise when you would like to see her. Thanks //kah

## 2025-03-12 ENCOUNTER — PATIENT MESSAGE (OUTPATIENT)
Dept: ADMINISTRATIVE | Facility: CLINIC | Age: 75
End: 2025-03-12
Payer: MEDICARE

## (undated) DEVICE — SEE MEDLINE ITEM 157027

## (undated) DEVICE — SUT SILK 2-0 SH 18IN BLACK

## (undated) DEVICE — ELECTRODE EXTENDED BLADE

## (undated) DEVICE — COVER OVERHEAD SURG LT BLUE

## (undated) DEVICE — SUT SILK 2-0 STRANDS 30IN

## (undated) DEVICE — SYR 10CC LUER LOCK

## (undated) DEVICE — COVER PROBE NEOGUARD 1X11.8IN

## (undated) DEVICE — SUT 2/0 30IN SILK BLK BRAI

## (undated) DEVICE — SUT VICRYL 3-0 27 SH

## (undated) DEVICE — SEE MEDLINE ITEM 152739

## (undated) DEVICE — SUT 4-0 12-30IN SILK

## (undated) DEVICE — SEE MEDLINE ITEM 157128

## (undated) DEVICE — SEE MEDLINE ITEM 157117

## (undated) DEVICE — UNDERGLOVES BIOGEL PI SIZE 8.5

## (undated) DEVICE — PACK UNIVERSAL SPLIT II

## (undated) DEVICE — SEE MEDLINE ITEM 146417

## (undated) DEVICE — SOL 9P NACL IRR PIC IL

## (undated) DEVICE — SUPPORT ULNA NERVE PROTECTOR

## (undated) DEVICE — COVER PROBE NL STRL 3.6X96IN

## (undated) DEVICE — SUT ETHILON 2-0 PSLX 30IN

## (undated) DEVICE — POSITIONER HEAD DONUT 9IN FOAM

## (undated) DEVICE — GAUZE FLUFF XXLG 36X36 2 PLY

## (undated) DEVICE — SUT 4/0 18IN SILK BLK BRAI

## (undated) DEVICE — ADHESIVE MASTISOL VIAL 48/BX

## (undated) DEVICE — ELECTRODE REM PLYHSV RETURN 9

## (undated) DEVICE — ELECTRODE BLADE INSULATED 1 IN

## (undated) DEVICE — MANIFOLD 4 PORT

## (undated) DEVICE — SUT VICRYL 2-0 27 CT-1

## (undated) DEVICE — BOARD SPEC RAD

## (undated) DEVICE — SUT 1 48IN PDS II VIO MONO

## (undated) DEVICE — DRESSING TRANS 4X4 TEGADERM

## (undated) DEVICE — SEE MEDLINE ITEM 152529

## (undated) DEVICE — DRESSING ABSRBNT ISLAND 3.6X8

## (undated) DEVICE — SEE MEDLINE ITEM 152622

## (undated) DEVICE — SUT CTD VICRYL 4-0 BR PS-2

## (undated) DEVICE — TAPE MEDIPORE 4IN X 2YDS

## (undated) DEVICE — APPLIER CLIP LIAGCLIP 9.375IN

## (undated) DEVICE — GLOVE SURG BIOGEL LATEX SZ 7.5

## (undated) DEVICE — SEE MEDLINE ITEM 157216

## (undated) DEVICE — GAUZE SPONGE 4X4 12PLY

## (undated) DEVICE — SUT MONOCYRL 4-0 PS2 UND

## (undated) DEVICE — SPONGE IV DRAIN 4X4 STERILE

## (undated) DEVICE — HEMOSTAT SURGICEL 4X8IN

## (undated) DEVICE — NDL SAFETY 22G X 1.5 ECLIPSE

## (undated) DEVICE — SEE MEDLINE ITEM 146292

## (undated) DEVICE — GLOVE BIOGEL PI MICRO SZ 6.5

## (undated) DEVICE — SUT SILK 3-0 SH 18IN BLACK

## (undated) DEVICE — SEE MEDLINE ITEM 156902

## (undated) DEVICE — GOWN SURGICAL XX LARGE X LONG

## (undated) DEVICE — TRAY MINOR GEN SURG

## (undated) DEVICE — PAD K-THERMIA 24IN X 60IN

## (undated) DEVICE — ADHESIVE DERMABOND ADVANCED

## (undated) DEVICE — SHEET THYROID W/ISO-BAC

## (undated) DEVICE — BLADE SURG #15 CARBON STEEL

## (undated) DEVICE — SUT VICRYL 4-0 ANTIBACT

## (undated) DEVICE — SUT SILK 3-0 STRANDS 30IN

## (undated) DEVICE — APPLIER LIGACLIP MED 11IN

## (undated) DEVICE — SEE MEDLINE ITEM 146420

## (undated) DEVICE — CLOSURE SKIN STERI STRIP 1/2X4

## (undated) DEVICE — SEE MEDLINE ITEM 157137

## (undated) DEVICE — GLOVE PROTEXIS HYDROGEL SZ7.5

## (undated) DEVICE — TRAY FOLEY 16FR INFECTION CONT

## (undated) DEVICE — CLIP LIGATION MEDIUM CLIPS 1

## (undated) DEVICE — SEE MEDLINE ITEM 157148

## (undated) DEVICE — RETRACTOR RADIALUX LIGHTED

## (undated) DEVICE — DRAIN PENRS STERILE LTX 18X1/2

## (undated) DEVICE — SPONGE LAP 18X18 PREWASHED

## (undated) DEVICE — DRAPE THYROID WITH ARMBOARD

## (undated) DEVICE — NDL SAFETY 25G X 1.5 ECLIPSE

## (undated) DEVICE — POWDER ARISTA AH 1GM

## (undated) DEVICE — APPLICATOR CHLORAPREP ORN 26ML

## (undated) DEVICE — VEST SURGICAL W/RT CUP

## (undated) DEVICE — STAPLER SKIN PROXIMATE WIDE

## (undated) DEVICE — STOCKINETTE IMPERVIOUS MEDIUM